# Patient Record
Sex: MALE | Race: WHITE | NOT HISPANIC OR LATINO | Employment: UNEMPLOYED | URBAN - METROPOLITAN AREA
[De-identification: names, ages, dates, MRNs, and addresses within clinical notes are randomized per-mention and may not be internally consistent; named-entity substitution may affect disease eponyms.]

---

## 2017-01-05 ENCOUNTER — APPOINTMENT (OUTPATIENT)
Dept: RADIOLOGY | Facility: MEDICAL CENTER | Age: 37
End: 2017-01-05
Attending: EMERGENCY MEDICINE
Payer: MEDICAID

## 2017-01-05 ENCOUNTER — HOSPITAL ENCOUNTER (EMERGENCY)
Facility: MEDICAL CENTER | Age: 37
End: 2017-01-05
Attending: EMERGENCY MEDICINE
Payer: MEDICAID

## 2017-01-05 VITALS
HEIGHT: 78 IN | BODY MASS INDEX: 36.45 KG/M2 | RESPIRATION RATE: 18 BRPM | TEMPERATURE: 97 F | OXYGEN SATURATION: 97 % | HEART RATE: 73 BPM | SYSTOLIC BLOOD PRESSURE: 133 MMHG | DIASTOLIC BLOOD PRESSURE: 81 MMHG | WEIGHT: 315 LBS
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
## 2017-01-05 DIAGNOSIS — S16.1XXA NECK STRAIN, INITIAL ENCOUNTER: ICD-10-CM

## 2017-01-05 PROCEDURE — 99284 EMERGENCY DEPT VISIT MOD MDM: CPT

## 2017-01-05 PROCEDURE — 73030 X-RAY EXAM OF SHOULDER: CPT | Mod: RT

## 2017-01-05 PROCEDURE — 72125 CT NECK SPINE W/O DYE: CPT

## 2017-01-05 RX ORDER — HYDROCODONE BITARTRATE AND ACETAMINOPHEN 5; 325 MG/1; MG/1
1 TABLET ORAL EVERY 4 HOURS PRN
Qty: 20 TAB | Refills: 0 | Status: SHIPPED | OUTPATIENT
Start: 2017-01-05 | End: 2017-01-28

## 2017-01-05 RX ORDER — NAPROXEN 500 MG/1
500 TABLET ORAL 2 TIMES DAILY WITH MEALS
Qty: 14 TAB | Refills: 0 | Status: SHIPPED | OUTPATIENT
Start: 2017-01-05 | End: 2017-01-12

## 2017-01-05 ASSESSMENT — PAIN SCALES - GENERAL: PAINLEVEL_OUTOF10: 10

## 2017-01-05 NOTE — ED AVS SNAPSHOT
ZEturf Access Code: [email or web address]  Expires: 2/4/2017  8:09 PM    ZEturf  A secure, online tool to manage your health information     [employer]’s ZEturf® is a secure, online tool that connects you to your personalized health information from the privacy of your home -- day or night - making it very easy for you to manage your healthcare. Once the activation process is completed, you can even access your medical information using the ZEturf jodi, which is available for free in the Apple Jodi store or Google Play store.     ZEturf provides the following levels of access (as shown below):   My Chart Features   [facility] Primary Care Doctor [facility]  Specialists [facility]  Urgent  Care Non-[facility]  Primary Care  Doctor   Email your healthcare team securely and privately 24/7 X X X X   Manage appointments: schedule your next appointment; view details of past/upcoming appointments X      Request prescription refills. X      View recent personal medical records, including lab and immunizations X X X X   View health record, including health history, allergies, medications X X X X   Read reports about your outpatient visits, procedures, consult and ER notes X X X X   See your discharge summary, which is a recap of your hospital and/or ER visit that includes your diagnosis, lab results, and care plan. X X       How to register for ZEturf:  1. Go to  [email or web address].  2. Click on the Sign Up Now box, which takes you to the New Member Sign Up page. You will need to provide the following information:  a. Enter your ZEturf Access Code exactly as it appears at the top of this page. (You will not need to use this code after you’ve completed the sign-up process. If you do not sign up before the expiration date, you must request a new code.)   b. Enter your date of birth.   c. Enter your home email address.   d. Click Submit, and follow the next screen’s instructions.  3. Create a ZEturf ID. This will be your ZEturf  login ID and cannot be changed, so think of one that is secure and easy to remember.  4. Create a Rent.com password. You can change your password at any time.  5. Enter your Password Reset Question and Answer. This can be used at a later time if you forget your password.   6. Enter your e-mail address. This allows you to receive e-mail notifications when new information is available in Rent.[email or web address].  7. Click Sign Up. You can now view your health information.    For assistance activating your Rent.com account, call [phone]

## 2017-01-05 NOTE — ED AVS SNAPSHOT
After Visit Summary                                                                                                                Norm Prabhakar   MRN: [id number]    Department:  [facility], Emergency Dept   Date of Visit:  1/5/2017            [facility], Emergency Dept    [address]    [facility] [address]    Phone:  [phone]      You were seen by     Gary Gansert, M.D.      Your Diagnosis Was     Fall, initial encounter     W19.XXXA       Follow-up Information     1. Follow up with Long Linares M.D.. Schedule an appointment as soon as possible for a visit in 1 week.    Specialty:  Family Medicine    Why:  As needed    Contact information    [address]  [address]  [facility] [address]  [phone]        Medication Information     Review all of your home medications and newly ordered medications with your primary doctor and/or pharmacist as soon as possible. Follow medication instructions as directed by your doctor and/or pharmacist.     Please keep your complete medication list with you and share with your physician. Update the information when medications are discontinued, doses are changed, or new medications (including over-the-counter products) are added; and carry medication information at all times in the event of emergency situations.               Medication List      START taking these medications        Instructions    hydrocodone-acetaminophen 5-325 MG Tabs per tablet   Commonly known as:  NORCO    Take 1 Tab by mouth every four hours as needed.   Dose:  1 Tab         ASK your doctor about these medications        Instructions    calcium carbonate 500 MG Chew   Commonly known as:  TUMS    Take 1 Tab by mouth after meals as needed.   Dose:  500 mg       DEPAKOTE PO    Take  by mouth.       ibuprofen 600 MG Tabs   Commonly known as:  MOTRIN    Take 1 Tab by mouth every 6 hours as needed.   Dose:  600 mg       levothyroxine 125 MCG Tabs   Commonly known  as:  SYNTHROID    Take 125 mcg by mouth Every morning on an empty stomach.   Dose:  125 mcg       * naproxen 500 MG Tabs   What changed:  Another medication with the same name was added. Make sure you understand how and when to take each.   Commonly known as:  NAPROSYN   Ask about: Which instructions should I use?    Take 1 Tab by mouth 2 times a day, with meals.   Dose:  500 mg       * naproxen 500 MG Tabs   What changed:  You were already taking a medication with the same name, and this prescription was added. Make sure you understand how and when to take each.   Commonly known as:  NAPROSYN   Ask about: Which instructions should I use?    Take 1 Tab by mouth 2 times a day, with meals.   Dose:  500 mg       prazosin 2 MG Caps   Commonly known as:  MINIPRESS    Take 3 Caps by mouth every evening.   Dose:  6 mg       sertraline 100 MG Tabs   Commonly known as:  ZOLOFT    Take 100 mg by mouth every day.   Dose:  100 mg       * Notice:  This list has 2 medication(s) that are the same as other medications prescribed for you. Read the directions carefully, and ask your doctor or other care provider to review them with you.            Procedures and tests performed during your visit     CT-CSPINE WITHOUT PLUS RECONS    DX-SHOULDER 2+ RIGHT            Patient Information     Patient Information    Following emergency treatment: all patient requiring follow-up care must return either to a private physician or a clinic if your condition worsens before you are able to obtain further medical attention, please return to the emergency room.     Billing Information    At [facility], we work to make the billing process streamlined for our patients.  Our Representatives are here to answer any questions you may have regarding your hospital bill.  If you have insurance coverage and have supplied your insurance information to us, we will submit a claim to your insurer on your behalf.  Should you have any questions regarding your  bill, we can be reached online or by phone as follows:  Online: You are able pay your bills online or live chat with our representatives about any billing questions you may have. We are here to help Monday - Friday from 8:00am to 7:30pm and 9:00am - 12:00pm on Saturdays.  Please visit [email or web address]  for more information.   Phone:  [phone] or [phone]    Please note that your emergency physician, surgeon, pathologist, radiologist, anesthesiologist, and other specialists are not employed by [facility] and will therefore bill separately for their services.  Please contact them directly for any questions concerning their bills at the numbers below:     Emergency Physician Services:  [phone]  [facility]:  [phone]  [facility]:  [phone]  [facility]:  [phone]    1. Your final bill may vary from the amount quoted upon discharge if all procedures are not complete at that time, or if your doctor has additional procedures of which we are not aware. You will receive an additional bill if you return to the Emergency Department at [facility] for suture removal regardless of the facility of which the sutures were placed.     2. Please arrange for settlement of this account at the emergency registration.    3. All self-pay accounts are due in full at the time of treatment.  If you are unable to meet this obligation then payment is expected within 4-5 days.     4. If you have had radiology studies (CT, X-ray, Ultrasound, MRI), you have received a preliminary result during your emergency department visit. Please contact the radiology department [phone] to receive a copy of your final result. Please discuss the Final result with your primary physician or with the follow up physician provided.     Crisis Hotline:  [place] Crisis Hotline:  7-840-OYOAUXB or 1-868.586.8981  [place] Crisis Hotline:     4-188-735-0029 or 814-801-6566         ED Discharge Follow Up Questions    1. In order to provide you with very good care, we would like to follow up with a phone call in the next few days.  May we have your permission to contact you?     YES /  NO    2. What is the best phone number to call you? (       )_____-__________    3. What is the best time to call you?      Morning  /  Afternoon  /  Evening                   Patient Signature:  ____________________________________________________________    Date:  ____________________________________________________________

## 2017-01-05 NOTE — ED AVS SNAPSHOT
1/5/2017          Norm Prabhakar  [address] [facility] [address]    Dear Norm:    [facility] wants to ensure your discharge home is safe and you or your loved ones have had all your questions answered regarding your care after you leave the hospital.    You may receive a telephone call within two days of your discharge.  This call is to make certain you understand your discharge instructions as well as ensure we provided you with the best care possible during your stay with us.     The call will only last approximately 3-5 minutes and will be done by a nurse.    Once again, we want to ensure your discharge home is safe and that you have a clear understanding of any next steps in your care.  If you have any questions or concerns, please do not hesitate to contact us, we are here for you.  Thank you for choosing [facility] for your healthcare needs.    Sincerely,    Alex Vasquez    [facility]

## 2017-01-06 NOTE — ED PROVIDER NOTES
"ED Provider Note  CHIEF COMPLAINT  Chief Complaint   Patient presents with   • GLF     Pt BIB self to triage for c/o right shoulder pain after slipping on ice. pt reports right shoulder pain/neck pain. denies LOC/any other trauma.    • Shoulder Pain   • Neck Pain       HPI  Norm Prabhakar is a 36 y.o. male who presents for evaluation of some neck pain and right shoulder pain after a ground-level fall on some ice. No other injuries. No loss of consciousness. No injury to the chest of the abdomen or the other extremities. The incident happened within the past couple of hours.    REVIEW OF SYSTEMS  See HPI for further details. Review of systems otherwise negative.     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   • Psychiatric problem 2002     PTSD   • Mental disorder      learning disabilities; speech impairment; developmental delays   • Seizure (HCC) 2010   • Pneumonia      remote   • Indigestion      once in a while   • Unspecified disorder of thyroid        FAMILY HISTORY  Family History   Problem Relation Age of Onset   • Hypertension Mother    • Heart Disease Mother    • Lung Disease Mother    • Stroke Maternal Grandmother        SOCIAL HISTORY  Nonsmoker    SURGICAL HISTORY  Past Surgical History   Procedure Laterality Date   • Eye surgery     • Thyroid lobectomy     • Other       Hernia Repair when he was 10 yrs old         ALLERGIES  Allergies   Allergen Reactions   • Abilify Rash     \"feeling tired, like I don't even know whats going on around me\"   • Aripiprazole      \"I became lethargic.\"   • Fish        PHYSICAL EXAM  VITAL SIGNS: /81 mmHg  Pulse 73  Temp(Src) 36.1 °C (97 °F)  Resp 18  Ht 1.981 m (6' " "5.99\")  Wt 145.9 kg (321 lb 10.4 oz)  BMI 37.18 kg/m2  SpO2 97%  vital signs are noted.  [id number]: Alert male adult  HENT: head is nontender. Negative raccoon sign. Negative Hodges's sign. Negative hemotympanum. Ears: Clear. Nose: Symmetrical and nontender. No obvious signs of facial trauma. Throat is clear with no stridor, drooling, trismus.  Eyes: PERRLA, EOMI, Conjunctiva normal, No discharge.   Neck: Really no significant tenderness or pain to the posterior portion of the neck. Little bit of right trapezius muscular kind of pain.  Cardiovascular: Normal heart rate, Normal rhythm, No murmurs, No rubs, No gallops.   Thorax & Lungs: Normal breath sounds, No respiratory distress, No wheezing, No chest tenderness.   Abdomen: Soft, nontender, no rigidity, no organomegaly, no ecchymosis.   Skin: Warm, Dry, No erythema, No rash.   Back: No back pain and no back tenderness. No tenderness to the cervical nor thoracic nor lumbar spine areas.  Extremities: Intact distal pulses, No edema, No tenderness, No cyanosis   Musculoskeletal: Pain with motion to the right shoulder. No obvious deformity. No obvious clinical fracture. No scapular nor humeral nor clavicular fracture clinically.  Neurologic: Alert & oriented . Cranial nerves are grossly intact as tested. Patient moves all 4 extremities well.     RADIOLOGY/PROCEDURES  CT-CSPINE WITHOUT PLUS RECONS   Final Result      1.  No evidence of cervical spine fracture.      2.  Again seen kyphotic deformity at the C4-5 level.      DX-SHOULDER 2+ RIGHT   Final Result      No evidence of acute fracture or dislocation.              COURSE & MEDICAL DECISION MAKING  CT of the cervical spine is been obtained and an x-ray of the right shoulder has been obtained.    CT of the cervical spine shows no fracture. X-ray of the shoulder shows no fracture and no bony deformity. Patient most likely just has a right shoulder strain.    Home treatment: #1 a right arm sling is applied " and is in good position #2 a prescription for some Naprosyn and hydrocodone. #3 orthopedic follow-up given. Stable on discharge.    FINAL IMPRESSION  1. Neck strain  2. Right shoulder strain      Electronically signed by: Gary Gansert, 1/5/2017

## 2017-01-06 NOTE — ED NOTES
Chief Complaint   Patient presents with   • GLF     Pt BIB self to triage for c/o right shoulder pain after slipping on ice. pt reports right shoulder pain/neck pain. denies LOC/any other trauma.    • Shoulder Pain   • Neck Pain     Explained to pt triage process, made pt aware to tell this RN of any changes/concerns, pt verbalized understanding of process and instructions given. Pt to ER lobby.

## 2017-01-06 NOTE — ED NOTES
Discharge orders received,  instructions and education given, follow-up appointments discussed, pt verbalized understanding.

## 2017-01-12 ENCOUNTER — HOSPITAL ENCOUNTER (EMERGENCY)
Facility: MEDICAL CENTER | Age: 37
End: 2017-01-12
Attending: EMERGENCY MEDICINE
Payer: MEDICAID

## 2017-01-12 VITALS
SYSTOLIC BLOOD PRESSURE: 124 MMHG | HEART RATE: 68 BPM | WEIGHT: 315 LBS | RESPIRATION RATE: 14 BRPM | BODY MASS INDEX: 36.67 KG/M2 | OXYGEN SATURATION: 97 % | TEMPERATURE: 96.8 F | DIASTOLIC BLOOD PRESSURE: 78 MMHG

## 2017-01-12 DIAGNOSIS — R51.9 CHRONIC NONINTRACTABLE HEADACHE, UNSPECIFIED HEADACHE TYPE: ICD-10-CM

## 2017-01-12 DIAGNOSIS — G89.29 CHRONIC NONINTRACTABLE HEADACHE, UNSPECIFIED HEADACHE TYPE: ICD-10-CM

## 2017-01-12 PROCEDURE — A9270 NON-COVERED ITEM OR SERVICE: HCPCS | Performed by: EMERGENCY MEDICINE

## 2017-01-12 PROCEDURE — 700102 HCHG RX REV CODE 250 W/ 637 OVERRIDE(OP): Performed by: EMERGENCY MEDICINE

## 2017-01-12 PROCEDURE — 700111 HCHG RX REV CODE 636 W/ 250 OVERRIDE (IP): Performed by: EMERGENCY MEDICINE

## 2017-01-12 PROCEDURE — 99283 EMERGENCY DEPT VISIT LOW MDM: CPT

## 2017-01-12 RX ORDER — DEXAMETHASONE 4 MG/1
8 TABLET ORAL ONCE
Status: COMPLETED | OUTPATIENT
Start: 2017-01-12 | End: 2017-01-12

## 2017-01-12 RX ORDER — DIPHENHYDRAMINE HCL 25 MG
25 TABLET ORAL ONCE
Status: COMPLETED | OUTPATIENT
Start: 2017-01-12 | End: 2017-01-12

## 2017-01-12 RX ORDER — ONDANSETRON 4 MG/1
4 TABLET, ORALLY DISINTEGRATING ORAL ONCE
Status: COMPLETED | OUTPATIENT
Start: 2017-01-12 | End: 2017-01-12

## 2017-01-12 RX ORDER — IBUPROFEN 600 MG/1
600 TABLET ORAL ONCE
Status: COMPLETED | OUTPATIENT
Start: 2017-01-12 | End: 2017-01-12

## 2017-01-12 RX ORDER — BUTALBITAL, ACETAMINOPHEN AND CAFFEINE 50; 325; 40 MG/1; MG/1; MG/1
1 TABLET ORAL ONCE
Status: COMPLETED | OUTPATIENT
Start: 2017-01-12 | End: 2017-01-12

## 2017-01-12 RX ADMIN — DIPHENHYDRAMINE HCL 25 MG: 25 TABLET ORAL at 14:20

## 2017-01-12 RX ADMIN — BUTALBITAL, ACETAMINOPHEN, AND CAFFEINE 1 TABLET: 50; 325; 40 TABLET ORAL at 14:20

## 2017-01-12 RX ADMIN — ONDANSETRON 4 MG: 4 TABLET, ORALLY DISINTEGRATING ORAL at 14:20

## 2017-01-12 RX ADMIN — DEXAMETHASONE 8 MG: 4 TABLET ORAL at 14:20

## 2017-01-12 RX ADMIN — IBUPROFEN 600 MG: 600 TABLET, FILM COATED ORAL at 14:20

## 2017-01-12 ASSESSMENT — LIFESTYLE VARIABLES: DO YOU DRINK ALCOHOL: NO

## 2017-01-12 ASSESSMENT — PAIN SCALES - GENERAL: PAINLEVEL_OUTOF10: 10

## 2017-01-12 NOTE — ED AVS SNAPSHOT
BoxVentures Access Code: [email or web address]  Expires: 2/4/2017  8:09 PM    BoxVentures  A secure, online tool to manage your health information     [employer]’s BoxVentures® is a secure, online tool that connects you to your personalized health information from the privacy of your home -- day or night - making it very easy for you to manage your healthcare. Once the activation process is completed, you can even access your medical information using the BoxVentures jodi, which is available for free in the Apple Jodi store or Google Play store.     BoxVentures provides the following levels of access (as shown below):   My Chart Features   [facility] Primary Care Doctor [facility]  Specialists [facility]  Urgent  Care Non-[facility]  Primary Care  Doctor   Email your healthcare team securely and privately 24/7 X X X X   Manage appointments: schedule your next appointment; view details of past/upcoming appointments X      Request prescription refills. X      View recent personal medical records, including lab and immunizations X X X X   View health record, including health history, allergies, medications X X X X   Read reports about your outpatient visits, procedures, consult and ER notes X X X X   See your discharge summary, which is a recap of your hospital and/or ER visit that includes your diagnosis, lab results, and care plan. X X       How to register for BoxVentures:  1. Go to  [email or web address].  2. Click on the Sign Up Now box, which takes you to the New Member Sign Up page. You will need to provide the following information:  a. Enter your BoxVentures Access Code exactly as it appears at the top of this page. (You will not need to use this code after you’ve completed the sign-up process. If you do not sign up before the expiration date, you must request a new code.)   b. Enter your date of birth.   c. Enter your home email address.   d. Click Submit, and follow the next screen’s instructions.  3. Create a BoxVentures ID. This will be your BoxVentures  login ID and cannot be changed, so think of one that is secure and easy to remember.  4. Create a [employer] password. You can change your password at any time.  5. Enter your Password Reset Question and Answer. This can be used at a later time if you forget your password.   6. Enter your e-mail address. This allows you to receive e-mail notifications when new information is available in [employer].  7. Click Sign Up. You can now view your health information.    For assistance activating your [employer] account, call [phone]

## 2017-01-12 NOTE — ED NOTES
Verbalizes understanding of discharge and followup instructions. VSS. Ambulates with steady gait to discharge.

## 2017-01-12 NOTE — ED AVS SNAPSHOT
1/12/2017          Norm Prabhakar  [address] [facility] [address] [facility] [address]    Dear Norm:    [facility] wants to ensure your discharge home is safe and you or your loved ones have had all your questions answered regarding your care after you leave the hospital.    You may receive a telephone call within two days of your discharge.  This call is to make certain you understand your discharge instructions as well as ensure we provided you with the best care possible during your stay with us.     The call will only last approximately 3-5 minutes and will be done by a nurse.    Once again, we want to ensure your discharge home is safe and that you have a clear understanding of any next steps in your care.  If you have any questions or concerns, please do not hesitate to contact us, we are here for you.  Thank you for choosing [facility] for your healthcare needs.    Sincerely,    Alex Vasquez    [facility]

## 2017-01-12 NOTE — DISCHARGE INSTRUCTIONS
"Headaches, Frequently Asked Questions  MIGRAINE HEADACHES  Q: What is migraine? What causes it? How can I treat it?  A: Generally, migraine headaches begin as a dull ache. Then they develop into a constant, throbbing, and pulsating pain. You may experience pain at the temples. You may experience pain at the front or back of one or both sides of the head. The pain is usually accompanied by a combination of:  · Nausea.   · Vomiting.   · Sensitivity to light and noise.   Some people (about 15%) experience an aura (see below) before an attack. The cause of migraine is believed to be chemical reactions in the brain. Treatment for migraine may include over-the-counter or prescription medications. It may also include self-help techniques. These include relaxation training and biofeedback.   Q: What is an aura?  A: About 15% of people with migraine get an \"aura\". This is a sign of neurological symptoms that occur before a migraine headache. You may see wavy or jagged lines, dots, or flashing lights. You might experience tunnel vision or blind spots in one or both eyes. The aura can include visual or auditory hallucinations (something imagined). It may include disruptions in smell (such as strange odors), taste or touch. Other symptoms include:  · Numbness.   · A \"pins and needles\" sensation.   · Difficulty in recalling or speaking the correct word.   These neurological events may last as long as 60 minutes. These symptoms will fade as the headache begins.  Q: What is a trigger?  A: Certain physical or environmental factors can lead to or \"trigger\" a migraine. These include:  · Foods.   · Hormonal changes.   · Weather.   · Stress.   It is important to remember that triggers are different for everyone. To help prevent migraine attacks, you need to figure out which triggers affect you. Keep a headache diary. This is a good way to track triggers. The diary will help you talk to your healthcare professional about your " "condition.  Q: Does weather affect migraines?  A: Bright sunshine, hot, humid conditions, and drastic changes in barometric pressure may lead to, or \"trigger,\" a migraine attack in some people. But studies have shown that weather does not act as a trigger for everyone with migraines.  Q: What is the link between migraine and hormones?  A: Hormones start and regulate many of your body's functions. Hormones keep your body in balance within a constantly changing environment. The levels of hormones in your body are unbalanced at times. Examples are during menstruation, pregnancy, or menopause. That can lead to a migraine attack. In fact, about three quarters of all women with migraine report that their attacks are related to the menstrual cycle.   Q: Is there an increased risk of stroke for migraine sufferers?  A: The likelihood of a migraine attack causing a stroke is very remote. That is not to say that migraine sufferers cannot have a stroke associated with their migraines. In persons under age 40, the most common associated factor for stroke is migraine headache. But over the course of a person's normal life span, the occurrence of migraine headache may actually be associated with a reduced risk of dying from cerebrovascular disease due to stroke.   Q: What are acute medications for migraine?  A: Acute medications are used to treat the pain of the headache after it has started. Examples over-the-counter medications, NSAIDs, ergots, and triptans.   Q: What are the triptans?  A: Triptans are the newest class of abortive medications. They are specifically targeted to treat migraine. Triptans are vasoconstrictors. They moderate some chemical reactions in the brain. The triptans work on receptors in your brain. Triptans help to restore the balance of a neurotransmitter called serotonin. Fluctuations in levels of serotonin are thought to be a main cause of migraine.   Q: Are over-the-counter medications for migraine " "effective?  A: Over-the-counter, or \"OTC,\" medications may be effective in relieving mild to moderate pain and associated symptoms of migraine. But you should see your caregiver before beginning any treatment regimen for migraine.   Q: What are preventive medications for migraine?  A: Preventive medications for migraine are sometimes referred to as \"prophylactic\" treatments. They are used to reduce the frequency, severity, and length of migraine attacks. Examples of preventive medications include antiepileptic medications, antidepressants, beta-blockers, calcium channel blockers, and NSAIDs (nonsteroidal anti-inflammatory drugs).  Q: Why are anticonvulsants used to treat migraine?  A: During the past few years, there has been an increased interest in antiepileptic drugs for the prevention of migraine. They are sometimes referred to as \"anticonvulsants\". Both epilepsy and migraine may be caused by similar reactions in the brain.   Q: Why are antidepressants used to treat migraine?  A: Antidepressants are typically used to treat people with depression. They may reduce migraine frequency by regulating chemical levels, such as serotonin, in the brain.   Q: What alternative therapies are used to treat migraine?  A: The term \"alternative therapies\" is often used to describe treatments considered outside the scope of conventional Western medicine. Examples of alternative therapy include acupuncture, acupressure, and yoga. Another common alternative treatment is herbal therapy. Some herbs are believed to relieve headache pain. Always discuss alternative therapies with your caregiver before proceeding. Some herbal products contain arsenic and other toxins.  TENSION HEADACHES  Q: What is a tension-type headache? What causes it? How can I treat it?  A: Tension-type headaches occur randomly. They are often the result of temporary stress, anxiety, fatigue, or anger. Symptoms include soreness in your temples, a tightening " "band-like sensation around your head (a \"vice-like\" ache). Symptoms can also include a pulling feeling, pressure sensations, and brent head and neck muscles. The headache begins in your forehead, temples, or the back of your head and neck. Treatment for tension-type headache may include over-the-counter or prescription medications. Treatment may also include self-help techniques such as relaxation training and biofeedback.  CLUSTER HEADACHES  Q: What is a cluster headache? What causes it? How can I treat it?  A: Cluster headache gets its name because the attacks come in groups. The pain arrives with little, if any, warning. It is usually on one side of the head. A tearing or bloodshot eye and a runny nose on the same side of the headache may also accompany the pain. Cluster headaches are believed to be caused by chemical reactions in the brain. They have been described as the most severe and intense of any headache type. Treatment for cluster headache includes prescription medication and oxygen.  SINUS HEADACHES  Q: What is a sinus headache? What causes it? How can I treat it?  A: When a cavity in the bones of the face and skull (a sinus) becomes inflamed, the inflammation will cause localized pain. This condition is usually the result of an allergic reaction, a tumor, or an infection. If your headache is caused by a sinus blockage, such as an infection, you will probably have a fever. An x-ray will confirm a sinus blockage. Your caregiver's treatment might include antibiotics for the infection, as well as antihistamines or decongestants.   REBOUND HEADACHES  Q: What is a rebound headache? What causes it? How can I treat it?  A: A pattern of taking acute headache medications too often can lead to a condition known as \"rebound headache.\" A pattern of taking too much headache medication includes taking it more than 2 days per week or in excessive amounts. That means more than the label or a caregiver advises. " With rebound headaches, your medications not only stop relieving pain, they actually begin to cause headaches. Doctors treat rebound headache by tapering the medication that is being overused. Sometimes your caregiver will gradually substitute a different type of treatment or medication. Stopping may be a challenge. Regularly overusing a medication increases the potential for serious side effects. Consult a caregiver if you regularly use headache medications more than 2 days per week or more than the label advises.  ADDITIONAL QUESTIONS AND ANSWERS  Q: What is biofeedback?  A: Biofeedback is a self-help treatment. Biofeedback uses special equipment to monitor your body's involuntary physical responses. Biofeedback monitors:  · Breathing.   · Pulse.   · Heart rate.   · Temperature.   · Muscle tension.   · Brain activity.   Biofeedback helps you refine and perfect your relaxation exercises. You learn to control the physical responses that are related to stress. Once the technique has been mastered, you do not need the equipment any more.  Q: Are headaches hereditary?  A: Four out of five (80%) of people that suffer report a family history of migraine. Scientists are not sure if this is genetic or a family predisposition. Despite the uncertainty, a child has a 50% chance of having migraine if one parent suffers. The child has a 75% chance if both parents suffer.   Q: Can children get headaches?  A: By the time they reach high school, most young people have experienced some type of headache. Many safe and effective approaches or medications can prevent a headache from occurring or stop it after it has begun.   Q: What type of doctor should I see to diagnose and treat my headache?  A: Start with your primary caregiver. Discuss his or her experience and approach to headaches. Discuss methods of classification, diagnosis, and treatment. Your caregiver may decide to recommend you to a headache specialist, depending upon  your symptoms or other physical conditions. Having diabetes, allergies, etc., may require a more comprehensive and inclusive approach to your headache. The [employer] will provide, upon request, a list of NHF physician members in your state.  Document Released: 03/09/2005 Document Revised: 03/11/2013 Document Reviewed: 08/17/2009  [employer]® Patient Information ©2013 [employer], [employer].

## 2017-01-12 NOTE — ED PROVIDER NOTES
ED Provider Note    Scribed for Lola Lopez M.D. by Disha Harris. 1/12/2017, 2:08 PM.    Primary Care Provider: Long Linares M.D.  Means of arrival: Walk-in  History obtained from: Patient  History limited by: None    CHIEF COMPLAINT  Chief Complaint   Patient presents with   • Head Ache     x6days       HPI  Norm Prabhakar is a 36 y.o. male who presents to the Emergency Department with headache for 6 days. Patient reports he has taken Excedrin, ibuprofen, and Tylenol without relief. He has also tried combining ibuprofen and Tylenol. He last took Excedrin this morning. Patient describes his headache as pounding, and reports one episode of emesis this morning. Patient has a history of headaches and states this headache feels similar. He tells me has been to the ED for headaches before and his headache has been relieved by medications here in the past. He denies fever, chills, ear pain, congestion, or other symptoms.     REVIEW OF SYSTEMS  Pertinent positives include headache, vomiting. Pertinent negatives include no fever, chills, ear pain, congestion.      PAST MEDICAL HISTORY   has a past medical history of ASTHMA; Glaucoma; Hypothyroidism; Depression; Anxiety; Seizure disorder ([facility]); Bipolar 1 disorder ([facility]); S/P thyroidectomy; Murmur; Fall; Glaucoma (1982); Psychiatric problem (2002); Mental disorder; Seizure ([facility]) (2010); Pneumonia; Indigestion; and Unspecified disorder of thyroid.    SOCIAL HISTORY  Social History   Substance Use Topics   • Smoking status: Former Smoker -- 0.25 packs/day for 4 years     Types: Cigars, Cigarettes     Quit date: 01/01/2014   • Smokeless tobacco: Never Used   • Alcohol Use: No      History   Drug Use No       SURGICAL HISTORY   has past surgical history that includes eye surgery; thyroid lobectomy; and other.     CURRENT MEDICATIONS  Home Medications     Reviewed by Alex Blevins R.N. (Registered Nurse) on 01/12/17 at 1407  Med List Status: Complete     "Medication Last Dose Status    calcium carbonate (TUMS) 500 MG Chew Tab prn Active    Divalproex Sodium (DEPAKOTE PO) taking Active    hydrocodone-acetaminophen (NORCO) 5-325 MG Tab per tablet prn Active    ibuprofen (MOTRIN) 600 MG Tab prn Active    levothyroxine (SYNTHROID) 125 MCG TABS 1/12/2017 Active    prazosin (MINIPRESS) 2 MG Cap 1/11/2017 Active    sertraline (ZOLOFT) 100 MG TABS 1/12/2017 Active                ALLERGIES  Allergies   Allergen Reactions   • Abilify Rash     \"feeling tired, like I don't even know whats going on around me\"   • Aripiprazole      \"I became lethargic.\"   • Fish        PHYSICAL EXAM  VITAL SIGNS: /74 mmHg  Pulse 79  Temp(Src) 36 °C (96.8 °F)  Resp 16  Wt 143.9 kg (317 lb 3.9 oz)  Constitutional: Alert in no apparent distress. Well apearing  HENT: Normocephalic, Atraumatic, Bilateral external ears normal. Nose normal.   Eyes:  Conjunctiva normal, non-icteric.   Lungs: Non-labored respirations  Skin: Warm, Dry, No erythema, No rash.   Neurologic: Alert, Grossly non-focal.   Psychiatric: Affect normal, Judgment normal, Mood normal, Appears appropriate and not intoxicated.   Extremities: No edema, no deformities      COURSE & MEDICAL DECISION MAKING  Pertinent Labs & Imaging studies reviewed. (See chart for details)    Reviewed patient's old medical records which showed the patient had a MRI of the head one month ago.     2:08 PM - Patient seen and examined at bedside. Patient has a history of chronic headaches this is consistent with his headaches in the past. He has had recent imaging of his head. I do not think any additional imaging is required. I explained to the patient I will treat his symptoms. We discussed narcotic pain medications are not effective treatments for headache. Patient will be treated with Fioricet -40 mg PO, Motrin 600 mg PO, Zofran 4 mg PO, Benadryl 25 mg PO, Decadron 8 mg PO. He was made aware he will be discharged home and was advised to " follow up with his primary care provider. He will return to the ED for vision changes, vomiting, worsening symptoms, or other medical concerns.       The patient will return for new or worsening symptoms and is stable at the time of discharge.    The patient is referred to a primary physician for blood pressure management, diabetic screening, and for all other preventative health concerns.    DISPOSITION:  Patient will be discharged home in stable condition.    FOLLOW UP:  Long Linares M.D.  [address]  [address]  [facility] [address]  [phone]    Schedule an appointment as soon as possible for a visit  As needed    [facility], Emergency Dept  [address] [id number] [phone]    If symptoms worsen      FINAL IMPRESSION  1. Chronic nonintractable headache, unspecified headache type         This dictation has been created using voice recognition software and/or scribes. The accuracy of the dictation is limited by the abilities of the software and the expertise of the scribes. I expect there may be some errors of grammar and possibly content. I made every attempt to manually correct the errors within my dictation. However, errors related to voice recognition software and/or scribes may still exist and should be interpreted within the appropriate context.     IDisha (Scribe), am scribing for, and in the presence of, Lola Lopez M.D..    Electronically signed by: Disha Harris (Scribe), 1/12/2017    Lola ROB M.D. personally performed the services described in this documentation, as scribed by Disha Harris in my presence, and it is both accurate and complete.    The note accurately reflects work and decisions made by me.  Lola Lopez  1/12/2017  3:06 PM

## 2017-01-12 NOTE — ED NOTES
Ambulatory to room. C/o headache x6 days, no relief from OTC treatments. Neuro intact. Chart up for ERP.

## 2017-01-12 NOTE — ED AVS SNAPSHOT
After Visit Summary                                                                                                                Norm Prabhakar   MRN: [id number]    Department:  [facility], Emergency Dept   Date of Visit:  1/12/2017            [facility], Emergency Dept    [address] [place] [id number]    Phone:  [phone]      You were seen by     Lola Lopez M.D.      Your Diagnosis Was     Chronic nonintractable headache, unspecified headache type     R51       These are the medications you received during your hospitalization from 01/12/2017 1341 to 01/12/2017 1442     Date/Time Order Dose Route Action    01/12/2017 1420 acetaminophen/caffeine/butalbital 325-40-50 mg (FIORICET) -40 MG per tablet 1 Tab 1 Tab Oral Given    01/12/2017 1420 ibuprofen (MOTRIN) tablet 600 mg 600 mg Oral Given    01/12/2017 1420 ondansetron (ZOFRAN ODT) dispertab 4 mg 4 mg Oral Given    01/12/2017 1420 diphenhydrAMINE (BENADRYL) tablet/capsule 25 mg 25 mg Oral Given    01/12/2017 1420 dexamethasone (DECADRON) tablet 8 mg 8 mg Oral Given      Follow-up Information     1. Schedule an appointment as soon as possible for a visit with Long Linares M.D..    Specialty:  Family Medicine    Why:  As needed    Contact information    [address]  [address]  [facility] [address] [phone]          2. Follow up with [facility], Emergency Dept.    Specialty:  Emergency Medicine    Why:  If symptoms worsen    Contact information    [address] [place] [id number] [phone]      Medication Information     Review all of your home medications and newly ordered medications with your primary doctor and/or pharmacist as soon as possible. Follow medication instructions as directed by your doctor and/or pharmacist.     Please keep your complete medication list with you and share with your physician. Update the information when medications are discontinued,  doses are changed, or new medications (including over-the-counter products) are added; and carry medication information at all times in the event of emergency situations.               Medication List      ASK your doctor about these medications        Instructions    calcium carbonate 500 MG Chew   Commonly known as:  TUMS    Take 1 Tab by mouth after meals as needed.   Dose:  500 mg       DEPAKOTE PO    Take  by mouth.       hydrocodone-acetaminophen 5-325 MG Tabs per tablet   Commonly known as:  NORCO    Take 1 Tab by mouth every four hours as needed.   Dose:  1 Tab       ibuprofen 600 MG Tabs   Commonly known as:  MOTRIN    Take 1 Tab by mouth every 6 hours as needed.   Dose:  600 mg       levothyroxine 125 MCG Tabs   Commonly known as:  SYNTHROID    Take 125 mcg by mouth Every morning on an empty stomach.   Dose:  125 mcg       prazosin 2 MG Caps   Commonly known as:  MINIPRESS    Take 3 Caps by mouth every evening.   Dose:  6 mg       sertraline 100 MG Tabs   Commonly known as:  ZOLOFT    Take 100 mg by mouth every day.   Dose:  100 mg                 Discharge Instructions       Headaches, Frequently Asked Questions  MIGRAINE HEADACHES  Q: What is migraine? What causes it? How can I treat it?  A: Generally, migraine headaches begin as a dull ache. Then they develop into a constant, throbbing, and pulsating pain. You may experience pain at the temples. You may experience pain at the front or back of one or both sides of the head. The pain is usually accompanied by a combination of:  · Nausea.   · Vomiting.   · Sensitivity to light and noise.   Some people (about 15%) experience an aura (see below) before an attack. The cause of migraine is believed to be chemical reactions in the brain. Treatment for migraine may include over-the-counter or prescription medications. It may also include self-help techniques. These include relaxation training and biofeedback.   Q: What is an aura?  A: About 15% of people with  "migraine get an \"aura\". This is a sign of neurological symptoms that occur before a migraine headache. You may see wavy or jagged lines, dots, or flashing lights. You might experience tunnel vision or blind spots in one or both eyes. The aura can include visual or auditory hallucinations (something imagined). It may include disruptions in smell (such as strange odors), taste or touch. Other symptoms include:  · Numbness.   · A \"pins and needles\" sensation.   · Difficulty in recalling or speaking the correct word.   These neurological events may last as long as 60 minutes. These symptoms will fade as the headache begins.  Q: What is a trigger?  A: Certain physical or environmental factors can lead to or \"trigger\" a migraine. These include:  · Foods.   · Hormonal changes.   · Weather.   · Stress.   It is important to remember that triggers are different for everyone. To help prevent migraine attacks, you need to figure out which triggers affect you. Keep a headache diary. This is a good way to track triggers. The diary will help you talk to your healthcare professional about your condition.  Q: Does weather affect migraines?  A: Bright sunshine, hot, humid conditions, and drastic changes in barometric pressure may lead to, or \"trigger,\" a migraine attack in some people. But studies have shown that weather does not act as a trigger for everyone with migraines.  Q: What is the link between migraine and hormones?  A: Hormones start and regulate many of your body's functions. Hormones keep your body in balance within a constantly changing environment. The levels of hormones in your body are unbalanced at times. Examples are during menstruation, pregnancy, or menopause. That can lead to a migraine attack. In fact, about three quarters of all women with migraine report that their attacks are related to the menstrual cycle.   Q: Is there an increased risk of stroke for migraine sufferers?  A: The likelihood of a migraine " "attack causing a stroke is very remote. That is not to say that migraine sufferers cannot have a stroke associated with their migraines. In persons under age 40, the most common associated factor for stroke is migraine headache. But over the course of a person's normal life span, the occurrence of migraine headache may actually be associated with a reduced risk of dying from cerebrovascular disease due to stroke.   Q: What are acute medications for migraine?  A: Acute medications are used to treat the pain of the headache after it has started. Examples over-the-counter medications, NSAIDs, ergots, and triptans.   Q: What are the triptans?  A: Triptans are the newest class of abortive medications. They are specifically targeted to treat migraine. Triptans are vasoconstrictors. They moderate some chemical reactions in the brain. The triptans work on receptors in your brain. Triptans help to restore the balance of a neurotransmitter called serotonin. Fluctuations in levels of serotonin are thought to be a main cause of migraine.   Q: Are over-the-counter medications for migraine effective?  A: Over-the-counter, or \"OTC,\" medications may be effective in relieving mild to moderate pain and associated symptoms of migraine. But you should see your caregiver before beginning any treatment regimen for migraine.   Q: What are preventive medications for migraine?  A: Preventive medications for migraine are sometimes referred to as \"prophylactic\" treatments. They are used to reduce the frequency, severity, and length of migraine attacks. Examples of preventive medications include antiepileptic medications, antidepressants, beta-blockers, calcium channel blockers, and NSAIDs (nonsteroidal anti-inflammatory drugs).  Q: Why are anticonvulsants used to treat migraine?  A: During the past few years, there has been an increased interest in antiepileptic drugs for the prevention of migraine. They are sometimes referred to as " "\"anticonvulsants\". Both epilepsy and migraine may be caused by similar reactions in the brain.   Q: Why are antidepressants used to treat migraine?  A: Antidepressants are typically used to treat people with depression. They may reduce migraine frequency by regulating chemical levels, such as serotonin, in the brain.   Q: What alternative therapies are used to treat migraine?  A: The term \"alternative therapies\" is often used to describe treatments considered outside the scope of conventional Western medicine. Examples of alternative therapy include acupuncture, acupressure, and yoga. Another common alternative treatment is herbal therapy. Some herbs are believed to relieve headache pain. Always discuss alternative therapies with your caregiver before proceeding. Some herbal products contain arsenic and other toxins.  TENSION HEADACHES  Q: What is a tension-type headache? What causes it? How can I treat it?  A: Tension-type headaches occur randomly. They are often the result of temporary stress, anxiety, fatigue, or anger. Symptoms include soreness in your temples, a tightening band-like sensation around your head (a \"vice-like\" ache). Symptoms can also include a pulling feeling, pressure sensations, and brent head and neck muscles. The headache begins in your forehead, temples, or the back of your head and neck. Treatment for tension-type headache may include over-the-counter or prescription medications. Treatment may also include self-help techniques such as relaxation training and biofeedback.  CLUSTER HEADACHES  Q: What is a cluster headache? What causes it? How can I treat it?  A: Cluster headache gets its name because the attacks come in groups. The pain arrives with little, if any, warning. It is usually on one side of the head. A tearing or bloodshot eye and a runny nose on the same side of the headache may also accompany the pain. Cluster headaches are believed to be caused by chemical reactions in " "the brain. They have been described as the most severe and intense of any headache type. Treatment for cluster headache includes prescription medication and oxygen.  SINUS HEADACHES  Q: What is a sinus headache? What causes it? How can I treat it?  A: When a cavity in the bones of the face and skull (a sinus) becomes inflamed, the inflammation will cause localized pain. This condition is usually the result of an allergic reaction, a tumor, or an infection. If your headache is caused by a sinus blockage, such as an infection, you will probably have a fever. An x-ray will confirm a sinus blockage. Your caregiver's treatment might include antibiotics for the infection, as well as antihistamines or decongestants.   REBOUND HEADACHES  Q: What is a rebound headache? What causes it? How can I treat it?  A: A pattern of taking acute headache medications too often can lead to a condition known as \"rebound headache.\" A pattern of taking too much headache medication includes taking it more than 2 days per week or in excessive amounts. That means more than the label or a caregiver advises. With rebound headaches, your medications not only stop relieving pain, they actually begin to cause headaches. Doctors treat rebound headache by tapering the medication that is being overused. Sometimes your caregiver will gradually substitute a different type of treatment or medication. Stopping may be a challenge. Regularly overusing a medication increases the potential for serious side effects. Consult a caregiver if you regularly use headache medications more than 2 days per week or more than the label advises.  ADDITIONAL QUESTIONS AND ANSWERS  Q: What is biofeedback?  A: Biofeedback is a self-help treatment. Biofeedback uses special equipment to monitor your body's involuntary physical responses. Biofeedback monitors:  · Breathing.   · Pulse.   · Heart rate.   · Temperature.   · Muscle tension.   · Brain activity.   Biofeedback helps " you refine and perfect your relaxation exercises. You learn to control the physical responses that are related to stress. Once the technique has been mastered, you do not need the equipment any more.  Q: Are headaches hereditary?  A: Four out of five (80%) of people that suffer report a family history of migraine. Scientists are not sure if this is genetic or a family predisposition. Despite the uncertainty, a child has a 50% chance of having migraine if one parent suffers. The child has a 75% chance if both parents suffer.   Q: Can children get headaches?  A: By the time they reach high school, most young people have experienced some type of headache. Many safe and effective approaches or medications can prevent a headache from occurring or stop it after it has begun.   Q: What type of doctor should I see to diagnose and treat my headache?  A: Start with your primary caregiver. Discuss his or her experience and approach to headaches. Discuss methods of classification, diagnosis, and treatment. Your caregiver may decide to recommend you to a headache specialist, depending upon your symptoms or other physical conditions. Having diabetes, allergies, etc., may require a more comprehensive and inclusive approach to your headache. The National Headache Foundation will provide, upon request, a list of NHF physician members in your state.  Document Released: 03/09/2005 Document Revised: 03/11/2013 Document Reviewed: 08/17/2009  [employer]® Patient Information ©2013 [employer].          Patient Information     Patient Information    Following emergency treatment: all patient requiring follow-up care must return either to a private physician or a clinic if your condition worsens before you are able to obtain further medical attention, please return to the emergency room.     Billing Information    At [facility], we work to make the billing process streamlined for our patients.  Our Representatives are here to answer any  questions you may have regarding your hospital bill.  If you have insurance coverage and have supplied your insurance information to us, we will submit a claim to your insurer on your behalf.  Should you have any questions regarding your bill, we can be reached online or by phone as follows:  Online: You are able pay your bills online or live chat with our representatives about any billing questions you may have. We are here to help Monday - Friday from 8:00am to 7:30pm and 9:00am - 12:00pm on Saturdays.  Please visit [email or web address]  for more information.   Phone:  [phone] or [phone]    Please note that your emergency physician, surgeon, pathologist, radiologist, anesthesiologist, and other specialists are not employed by [facility] and will therefore bill separately for their services.  Please contact them directly for any questions concerning their bills at the numbers below:     Emergency Physician Services:  [phone]  [facility]:  [phone]  [facility]:  [phone]  [facility]:  [phone]    1. Your final bill may vary from the amount quoted upon discharge if all procedures are not complete at that time, or if your doctor has additional procedures of which we are not aware. You will receive an additional bill if you return to the Emergency Department at [facility] for suture removal regardless of the facility of which the sutures were placed.     2. Please arrange for settlement of this account at the emergency registration.    3. All self-pay accounts are due in full at the time of treatment.  If you are unable to meet this obligation then payment is expected within 4-5 days.     4. If you have had radiology studies (CT, X-ray, Ultrasound, MRI), you have received a preliminary result during your emergency department visit. Please contact the radiology department [phone] to receive a copy of  your final result. Please discuss the Final result with your primary physician or with the follow up physician provided.     Crisis Hotline:  [place] Crisis Hotline:  3-554-SCERYVB or 1-295.300.7669  [place] Crisis Hotline:    1-231.711.6020 or 600-526-3687         ED Discharge Follow Up Questions    1. In order to provide you with very good care, we would like to follow up with a phone call in the next few days.  May we have your permission to contact you?     YES /  NO    2. What is the best phone number to call you? (       )_____-__________    3. What is the best time to call you?      Morning  /  Afternoon  /  Evening                   Patient Signature:  ____________________________________________________________    Date:  ____________________________________________________________

## 2017-01-12 NOTE — ED NOTES
Chief Complaint   Patient presents with   • Head Ache     x6days     Pt ambulated to triage, headache x6days not relief by Excedrin.   + light sensitivity.

## 2017-01-28 ENCOUNTER — APPOINTMENT (OUTPATIENT)
Dept: RADIOLOGY | Facility: MEDICAL CENTER | Age: 37
End: 2017-01-28
Attending: EMERGENCY MEDICINE
Payer: MEDICAID

## 2017-01-28 ENCOUNTER — HOSPITAL ENCOUNTER (OUTPATIENT)
Facility: MEDICAL CENTER | Age: 37
End: 2017-02-01
Attending: EMERGENCY MEDICINE | Admitting: INTERNAL MEDICINE
Payer: MEDICAID

## 2017-01-28 ENCOUNTER — RESOLUTE PROFESSIONAL BILLING HOSPITAL PROF FEE (OUTPATIENT)
Dept: HOSPITALIST | Facility: MEDICAL CENTER | Age: 37
End: 2017-01-28
Payer: MEDICAID

## 2017-01-28 DIAGNOSIS — R53.1 RIGHT SIDED WEAKNESS: ICD-10-CM

## 2017-01-28 LAB
ALBUMIN SERPL BCP-MCNC: 4.9 G/DL (ref 3.2–4.9)
ALBUMIN/GLOB SERPL: 2 G/DL
ALP SERPL-CCNC: 54 U/L (ref 30–99)
ALT SERPL-CCNC: 18 U/L (ref 2–50)
ANION GAP SERPL CALC-SCNC: 6 MMOL/L (ref 0–11.9)
APPEARANCE UR: CLEAR
APTT PPP: 28.1 SEC (ref 24.7–36)
AST SERPL-CCNC: 18 U/L (ref 12–45)
BASOPHILS # BLD AUTO: 0.9 % (ref 0–1.8)
BASOPHILS # BLD: 0.08 K/UL (ref 0–0.12)
BILIRUB SERPL-MCNC: 0.4 MG/DL (ref 0.1–1.5)
BILIRUB UR QL STRIP.AUTO: NEGATIVE
BUN SERPL-MCNC: 20 MG/DL (ref 8–22)
CALCIUM SERPL-MCNC: 9.4 MG/DL (ref 8.5–10.5)
CHLORIDE SERPL-SCNC: 104 MMOL/L (ref 96–112)
CO2 SERPL-SCNC: 27 MMOL/L (ref 20–33)
COLOR UR: NORMAL
CREAT SERPL-MCNC: 0.88 MG/DL (ref 0.5–1.4)
EKG IMPRESSION: NORMAL
EOSINOPHIL # BLD AUTO: 0.16 K/UL (ref 0–0.51)
EOSINOPHIL NFR BLD: 1.8 % (ref 0–6.9)
ERYTHROCYTE [DISTWIDTH] IN BLOOD BY AUTOMATED COUNT: 44.8 FL (ref 35.9–50)
GFR SERPL CREATININE-BSD FRML MDRD: >60 ML/MIN/1.73 M 2
GLOBULIN SER CALC-MCNC: 2.4 G/DL (ref 1.9–3.5)
GLUCOSE SERPL-MCNC: 134 MG/DL (ref 65–99)
GLUCOSE UR STRIP.AUTO-MCNC: NEGATIVE MG/DL
HCT VFR BLD AUTO: 43.6 % (ref 42–52)
HGB BLD-MCNC: 14.3 G/DL (ref 14–18)
IMM GRANULOCYTES # BLD AUTO: 0.09 K/UL (ref 0–0.11)
IMM GRANULOCYTES NFR BLD AUTO: 1 % (ref 0–0.9)
INR PPP: 0.9 (ref 0.87–1.13)
KETONES UR STRIP.AUTO-MCNC: NEGATIVE MG/DL
LEUKOCYTE ESTERASE UR QL STRIP.AUTO: NEGATIVE
LYMPHOCYTES # BLD AUTO: 3.06 K/UL (ref 1–4.8)
LYMPHOCYTES NFR BLD: 35.2 % (ref 22–41)
MCH RBC QN AUTO: 30.3 PG (ref 27–33)
MCHC RBC AUTO-ENTMCNC: 32.8 G/DL (ref 33.7–35.3)
MCV RBC AUTO: 92.4 FL (ref 81.4–97.8)
MICRO URNS: NORMAL
MONOCYTES # BLD AUTO: 0.92 K/UL (ref 0–0.85)
MONOCYTES NFR BLD AUTO: 10.6 % (ref 0–13.4)
NEUTROPHILS # BLD AUTO: 4.39 K/UL (ref 1.82–7.42)
NEUTROPHILS NFR BLD: 50.5 % (ref 44–72)
NITRITE UR QL STRIP.AUTO: NEGATIVE
NRBC # BLD AUTO: 0 K/UL
NRBC BLD AUTO-RTO: 0 /100 WBC
PH UR STRIP.AUTO: 7 [PH]
PLATELET # BLD AUTO: 295 K/UL (ref 164–446)
PMV BLD AUTO: 10.6 FL (ref 9–12.9)
POTASSIUM SERPL-SCNC: 4.1 MMOL/L (ref 3.6–5.5)
PROT SERPL-MCNC: 7.3 G/DL (ref 6–8.2)
PROT UR QL STRIP: NEGATIVE MG/DL
PROTHROMBIN TIME: 12.4 SEC (ref 12–14.6)
RBC # BLD AUTO: 4.72 M/UL (ref 4.7–6.1)
RBC UR QL AUTO: NEGATIVE
SODIUM SERPL-SCNC: 137 MMOL/L (ref 135–145)
SP GR UR STRIP.AUTO: 1.03
TROPONIN I SERPL-MCNC: <0.01 NG/ML (ref 0–0.04)
TSH SERPL DL<=0.005 MIU/L-ACNC: 12.44 UIU/ML (ref 0.3–3.7)
VALPROATE SERPL-MCNC: 42.3 UG/ML (ref 50–100)
WBC # BLD AUTO: 8.7 K/UL (ref 4.8–10.8)

## 2017-01-28 PROCEDURE — 71010 DX-CHEST-LIMITED (1 VIEW): CPT

## 2017-01-28 PROCEDURE — 85025 COMPLETE CBC W/AUTO DIFF WBC: CPT

## 2017-01-28 PROCEDURE — 36415 COLL VENOUS BLD VENIPUNCTURE: CPT

## 2017-01-28 PROCEDURE — 70450 CT HEAD/BRAIN W/O DYE: CPT

## 2017-01-28 PROCEDURE — 85730 THROMBOPLASTIN TIME PARTIAL: CPT

## 2017-01-28 PROCEDURE — A9270 NON-COVERED ITEM OR SERVICE: HCPCS | Performed by: INTERNAL MEDICINE

## 2017-01-28 PROCEDURE — 70496 CT ANGIOGRAPHY HEAD: CPT

## 2017-01-28 PROCEDURE — 80164 ASSAY DIPROPYLACETIC ACD TOT: CPT

## 2017-01-28 PROCEDURE — 84484 ASSAY OF TROPONIN QUANT: CPT

## 2017-01-28 PROCEDURE — 81003 URINALYSIS AUTO W/O SCOPE: CPT

## 2017-01-28 PROCEDURE — G0378 HOSPITAL OBSERVATION PER HR: HCPCS

## 2017-01-28 PROCEDURE — 80053 COMPREHEN METABOLIC PANEL: CPT

## 2017-01-28 PROCEDURE — 700102 HCHG RX REV CODE 250 W/ 637 OVERRIDE(OP): Performed by: INTERNAL MEDICINE

## 2017-01-28 PROCEDURE — 93005 ELECTROCARDIOGRAM TRACING: CPT

## 2017-01-28 PROCEDURE — 99285 EMERGENCY DEPT VISIT HI MDM: CPT

## 2017-01-28 PROCEDURE — 95951 EEG: CPT | Mod: 52

## 2017-01-28 PROCEDURE — 84443 ASSAY THYROID STIM HORMONE: CPT

## 2017-01-28 PROCEDURE — 700102 HCHG RX REV CODE 250 W/ 637 OVERRIDE(OP): Performed by: PSYCHIATRY & NEUROLOGY

## 2017-01-28 PROCEDURE — 70498 CT ANGIOGRAPHY NECK: CPT

## 2017-01-28 PROCEDURE — 85610 PROTHROMBIN TIME: CPT

## 2017-01-28 PROCEDURE — 99220 PR INITIAL OBSERVATION CARE,LEVL III: CPT | Performed by: INTERNAL MEDICINE

## 2017-01-28 PROCEDURE — A9270 NON-COVERED ITEM OR SERVICE: HCPCS | Performed by: PSYCHIATRY & NEUROLOGY

## 2017-01-28 RX ORDER — ONDANSETRON 2 MG/ML
4 INJECTION INTRAMUSCULAR; INTRAVENOUS EVERY 4 HOURS PRN
Status: DISCONTINUED | OUTPATIENT
Start: 2017-01-28 | End: 2017-02-01 | Stop reason: HOSPADM

## 2017-01-28 RX ORDER — ONDANSETRON 4 MG/1
4 TABLET, ORALLY DISINTEGRATING ORAL EVERY 4 HOURS PRN
Status: DISCONTINUED | OUTPATIENT
Start: 2017-01-28 | End: 2017-02-01 | Stop reason: HOSPADM

## 2017-01-28 RX ORDER — ENEMA 19; 7 G/133ML; G/133ML
1 ENEMA RECTAL
Status: DISCONTINUED | OUTPATIENT
Start: 2017-01-28 | End: 2017-02-01 | Stop reason: HOSPADM

## 2017-01-28 RX ORDER — LEVOTHYROXINE SODIUM 0.12 MG/1
125 TABLET ORAL
Status: DISCONTINUED | OUTPATIENT
Start: 2017-01-29 | End: 2017-01-28

## 2017-01-28 RX ORDER — DIVALPROEX SODIUM 500 MG/1
1000 TABLET, EXTENDED RELEASE ORAL EVERY EVENING
Status: DISCONTINUED | OUTPATIENT
Start: 2017-01-28 | End: 2017-01-28

## 2017-01-28 RX ORDER — DIVALPROEX SODIUM 500 MG/1
1000 TABLET, EXTENDED RELEASE ORAL EVERY EVENING
Status: ON HOLD | COMMUNITY
End: 2017-02-01

## 2017-01-28 RX ORDER — AMOXICILLIN 250 MG
1 CAPSULE ORAL NIGHTLY
Status: DISCONTINUED | OUTPATIENT
Start: 2017-01-28 | End: 2017-02-01 | Stop reason: HOSPADM

## 2017-01-28 RX ORDER — PROMETHAZINE HYDROCHLORIDE 25 MG/1
12.5-25 TABLET ORAL EVERY 4 HOURS PRN
Status: DISCONTINUED | OUTPATIENT
Start: 2017-01-28 | End: 2017-02-01 | Stop reason: HOSPADM

## 2017-01-28 RX ORDER — BISACODYL 10 MG
10 SUPPOSITORY, RECTAL RECTAL
Status: DISCONTINUED | OUTPATIENT
Start: 2017-01-28 | End: 2017-02-01 | Stop reason: HOSPADM

## 2017-01-28 RX ORDER — AMOXICILLIN 250 MG
1 CAPSULE ORAL
Status: DISCONTINUED | OUTPATIENT
Start: 2017-01-28 | End: 2017-02-01 | Stop reason: HOSPADM

## 2017-01-28 RX ORDER — LEVOTHYROXINE SODIUM 0.15 MG/1
150 TABLET ORAL
Status: DISCONTINUED | OUTPATIENT
Start: 2017-01-29 | End: 2017-02-01 | Stop reason: HOSPADM

## 2017-01-28 RX ORDER — LABETALOL HYDROCHLORIDE 5 MG/ML
10 INJECTION, SOLUTION INTRAVENOUS EVERY 4 HOURS PRN
Status: DISCONTINUED | OUTPATIENT
Start: 2017-01-28 | End: 2017-02-01 | Stop reason: HOSPADM

## 2017-01-28 RX ORDER — DIVALPROEX SODIUM 500 MG/1
1500 TABLET, EXTENDED RELEASE ORAL
Status: DISCONTINUED | OUTPATIENT
Start: 2017-01-28 | End: 2017-02-01 | Stop reason: HOSPADM

## 2017-01-28 RX ORDER — PRAZOSIN HYDROCHLORIDE 2 MG/1
2 CAPSULE ORAL NIGHTLY
Status: DISCONTINUED | OUTPATIENT
Start: 2017-01-28 | End: 2017-02-01 | Stop reason: HOSPADM

## 2017-01-28 RX ORDER — SERTRALINE HYDROCHLORIDE 100 MG/1
100 TABLET, FILM COATED ORAL EVERY MORNING
Status: DISCONTINUED | OUTPATIENT
Start: 2017-01-29 | End: 2017-02-01 | Stop reason: HOSPADM

## 2017-01-28 RX ORDER — PRAZOSIN HYDROCHLORIDE 2 MG/1
2 CAPSULE ORAL NIGHTLY
COMMUNITY
End: 2017-08-24

## 2017-01-28 RX ORDER — LACTULOSE 20 G/30ML
30 SOLUTION ORAL
Status: DISCONTINUED | OUTPATIENT
Start: 2017-01-28 | End: 2017-02-01 | Stop reason: HOSPADM

## 2017-01-28 RX ORDER — DOCUSATE SODIUM 100 MG/1
100 CAPSULE, LIQUID FILLED ORAL EVERY MORNING
Status: DISCONTINUED | OUTPATIENT
Start: 2017-01-29 | End: 2017-02-01 | Stop reason: HOSPADM

## 2017-01-28 RX ORDER — PROMETHAZINE HYDROCHLORIDE 25 MG/1
12.5-25 SUPPOSITORY RECTAL EVERY 4 HOURS PRN
Status: DISCONTINUED | OUTPATIENT
Start: 2017-01-28 | End: 2017-02-01 | Stop reason: HOSPADM

## 2017-01-28 RX ADMIN — DIVALPROEX SODIUM 1500 MG: 500 TABLET, FILM COATED, EXTENDED RELEASE ORAL at 21:05

## 2017-01-28 RX ADMIN — STANDARDIZED SENNA CONCENTRATE AND DOCUSATE SODIUM 1 TABLET: 8.6; 5 TABLET, FILM COATED ORAL at 21:05

## 2017-01-28 RX ADMIN — PRAZOSIN HYDROCHLORIDE 2 MG: 2 CAPSULE ORAL at 22:30

## 2017-01-28 ASSESSMENT — LIFESTYLE VARIABLES
TOTAL SCORE: 0
EVER HAD A DRINK FIRST THING IN THE MORNING TO STEADY YOUR NERVES TO GET RID OF A HANGOVER: NO
HAVE YOU EVER FELT YOU SHOULD CUT DOWN ON YOUR DRINKING: NO
ON A TYPICAL DAY WHEN YOU DRINK ALCOHOL HOW MANY DRINKS DO YOU HAVE: 0
EVER FELT BAD OR GUILTY ABOUT YOUR DRINKING: NO
ALCOHOL_USE: YES
AVERAGE NUMBER OF DAYS PER WEEK YOU HAVE A DRINK CONTAINING ALCOHOL: 0
TOTAL SCORE: 0
TOTAL SCORE: 0
EVER_SMOKED: YES
CONSUMPTION TOTAL: NEGATIVE
HOW MANY TIMES IN THE PAST YEAR HAVE YOU HAD 5 OR MORE DRINKS IN A DAY: 0
HAVE PEOPLE ANNOYED YOU BY CRITICIZING YOUR DRINKING: NO

## 2017-01-28 ASSESSMENT — PAIN SCALES - GENERAL
PAINLEVEL_OUTOF10: 0
PAINLEVEL_OUTOF10: 0

## 2017-01-28 NOTE — IP AVS SNAPSHOT
" <p align=\"LEFT\"><IMG SRC=\"//[email or web address]\" alt=\"Image\" WIDTH=\"50%\" HEIGHT=\"200\" BORDER=\"\"></p>                   Name:Norm Prabhakar  Medical Record Number:[id number]  CSN: [id number]    YOB: 1980   Age: 36 y.o.  Sex: male  HT:1.956 m (6' 5\") WT: 142.5 kg (314 lb 2.5 oz)          Admit Date: 1/28/2017     Discharge Date:   Today's Date: 2/1/2017  Attending Doctor:  Gretchen Galvan D.O.                  Allergies:  Abilify and Fish          Follow-up Information     1. Follow up with Long Linares M.D..    Specialty:  Family Medicine    Contact information    [address] [place] [id number]  [phone]           Medication List      Take these Medications        Instructions    divalproex  MG Tb24   Commonly known as:  DEPAKOTE ER    Take 3 Tabs by mouth every evening.   Dose:  1500 mg       levothyroxine 125 MCG Tabs   Commonly known as:  SYNTHROID    Take 125 mcg by mouth Every morning on an empty stomach.   Dose:  125 mcg       prazosin 2 MG Caps   Commonly known as:  MINIPRESS    Take 2 mg by mouth every evening.   Dose:  2 mg       sertraline 100 MG Tabs   Commonly known as:  ZOLOFT    Take 100 mg by mouth every morning.   Dose:  100 mg         "

## 2017-01-28 NOTE — ED NOTES
Pt bib EMS from group home.  Chief Complaint   Patient presents with   • Unilateral Weakness     rt arm and leg, while sitting playing video games at 1230     CT complete.  Pt to [facility].  Chart up for ERP.

## 2017-01-28 NOTE — CONSULTS
NEUROLOGY CONSULT.     Cx requested by Dr. Fuentes, possible stroke.    35 yo male with frequent ED visits for multiple reasons, h/o encephalopathy since childhood and epilepsy. H/o abnormal EEG. Has had multiple admission for right sided weakness / paresthesias. Lives at group home. Brought in today for yet another episode of right sided weakness and numbness/tingling. This started about one hour ago, not improving per pt. Denies any recent seizures, including no jerking or stiffening of the right side. Did not lose consciousness and was not confused. Says he has these episodes all the time. Has been worked up here for similar presentations in the past, with negative MRI brain for stroke (although MRI significantly abnormal) and diagnosed with suspected conversion disorder. He takes depakote daily but does not know dose. He states he is compliant. Dose confirmed by RN with group home of Depakote ER 1000 mg po qhs.     He denies any other complaints.     He has frequent headaches but not today.     No recent fevers or trauma. No neck pain.     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     He does not know whether there is a family history of epilepsy.     He denied smoking.     Examination:   I found him on his gurney, watching TV without any distress. He did not want to be interrupted as he was watching his favorite movie, he stated.   He was awake, fully alert and oriented.   Speech was minimally dysarthric.   Neck supple.   CV: RRR, no murmurs.   Skin exam showed no signs of trauma or rash.   He appeared to have recollection of recent and remote events.   He was able to comprehend and followed commands.   His eyes were midline, there is correctopia on the left and he is blind on this eye. Otherwise, his visual field is intact on the right eye.   Face appeared symmetric at first, there were inconsistencies on exam.   Tongue was midline, no tongue biting.   He exhibited a dense flaccid hemiplegia. Left sided extremities showed  normal strength.   He appeared to perceive touch throughout but indicated it was less on the right side.   Reflexes were 2+, with absent ankle jerks.   Plantars were flexor.       CTB was negative for acute process.     MRI brain wo 11/29/16, which I personally reviewed, shows a leukodystrophy with diffuse white matter disease but no acute infarct at that time.     Labs done show a subtherapeutic depakote level today as well as on prior admissions.     ASSESSMENT AND PLAN:   Recurrent admissions for right sided weakness and paresthesias with negative work up.   There are inconsistencies on exam, the possibility of conversion disorder has been entertained in the past.   Given history and exam findings, not suggestive of stroke, not a tpa candidate. Possible seizure vs conversion disorder.   It would be rare to have a Santosh's paralysis without a motor seizure, however, today's EEG was significant abnormal with continuous epileptiform discharges in the posterior regions, sometimes with brief periodic and rhythmic evolution but without clear evidence for seizures. I recommend increasing Depakote ER to 1500 mg po qhs.   His baseline MRI brain is significantly abnormal with a severe leukodystrophy.   No additional work up during inpatient.   He should follow up in my clinic after discharge and would benefit from elective prolonged EEG to assess OUTPT therapeutic response to increase in depakote.   He should be evaluated by PT prior to discharge.   Recommend observation overnight and possible discharge tomorrow.   We will sign off at this time.     Discussed with ED physician and RN.     Spent 60 mins in the care of this pt.

## 2017-01-28 NOTE — IP AVS SNAPSHOT
" After Visit Summary                                                                                                                  Name:Norm Prabhakar  Medical Record Number:[id number]  CSN: [id number]    YOB: 1980   Age: 36 y.o.  Sex: male  HT:1.956 m (6' 5\") WT: 142.5 kg (314 lb 2.5 oz)          Admit Date: 1/28/2017     Discharge Date:   Today's Date: 2/1/2017  Attending Doctor:  Gretchen Galvan D.O.                  Allergies:  Abilify and Fish            Discharge Instructions       Discharge Instructions    Discharged to group home by car with escort. Discharged via wheelchair, hospital escort: Yes.  Special equipment needed: Not Applicable    Be sure to schedule a follow-up appointment with your primary care doctor or any specialists as instructed.     Discharge Plan:   Influenza Vaccine Indication: Not indicated: Previously immunized this influenza season and > 8 years of age    I understand that a diet low in cholesterol, fat, and sodium is recommended for good health. Unless I have been given specific instructions below for another diet, I accept this instruction as my diet prescription.   Other diet: Regular    Special Instructions: None    · Is patient discharged on Warfarin / Coumadin?   No     · Is patient Post Blood Transfusion?  No    Depression / Suicide Risk    As you are discharged from this [facility] facility, it is important to learn how to keep safe from harming yourself.    Recognize the warning signs:  · Abrupt changes in personality, positive or negative- including increase in energy   · Giving away possessions  · Change in eating patterns- significant weight changes-  positive or negative  · Change in sleeping patterns- unable to sleep or sleeping all the time   · Unwillingness or inability to communicate  · Depression  · Unusual sadness, discouragement and loneliness  · Talk of wanting to die  · Neglect of personal appearance   · Rebelliousness- reckless " behavior  · Withdrawal from people/activities they love  · Confusion- inability to concentrate     If you or a loved one observes any of these behaviors or has concerns about self-harm, here's what you can do:  · Talk about it- your feelings and reasons for harming yourself  · Remove any means that you might use to hurt yourself (examples: pills, rope, extension cords, firearm)  · Get professional help from the community (Mental Health, Substance Abuse, psychological counseling)  · Do not be alone:Call your Safe Contact- someone whom you trust who will be there for you.  · Call your local CRISIS HOTLINE 380-7820 or 235-435-0367  · Call your local Children's Mobile Crisis Response Team [place] (772) 604-5332 or www.Firetide  · Call the toll free National Suicide Prevention Hotlines   · National Suicide Prevention Lifeline 683-914-LEWD (5356)  · Booster Line Network 800-IBXMFDW (655-5260)      Conversion Disorder  Conversion disorder is also known as functional neurological symptom disorder. People with this mental disorder have symptoms that suggest a brain or nervous system condition, such as a stroke or seizure. However, no such condition can be found to explain the symptoms. For people with conversion disorder, the symptoms may result in:  · Severe distress or anxiety.  · Disruption of relationships or other normal life activities.  · Medical evaluation. This often occurs in the primary care or emergency room setting.  Conversion disorder may begin anytime from late childhood through the young adult years. This disorder is more common in females than males.   CAUSES  The exact cause of this disorder is unknown. It is often triggered by stressful life events involving personal relationships.  SIGNS AND SYMPTOMS  Signs and symptoms of conversion disorder may include:  · Muscle weakness or paralysis. If this involves the bladder muscle, it can cause difficulty urinating.  · Seizures or attacks of  being unresponsive.  · Abnormal movement, such as tremors, jerks, muscle spasms, loss of balance, or difficulty walking.  · Difficulty swallowing or a feeling of having a lump in the throat.  · Loss of speech (aphonia) or slurring of words (dysarthria).  · Loss of the sensation of touch or pain.  · Changes in vision, such as blindness or double vision.  · Seeing things that are not there (hallucinations).  · Changes in hearing, such as deafness.  Symptoms usually occur suddenly but may increase gradually over time. They usually go away completely in a short time. Seizures and tremors are more likely than other conversion symptoms to come back or continue.  DIAGNOSIS  Diagnosis of this disorder starts with an evaluation by your health care provider. Exams and tests will be done to rule out serious physical health problems. The evaluation will vary depending on your specific symptoms. It may include:  · A physical exam, including a neurological exam.  · Lab tests on blood or urine samples.  · X-rays or other imaging studies.  · An electroencephalogram (EEG) to monitor brain waves for seizure activity.  Your health care provider may refer you to a mental health specialist for psychological evaluation.  TREATMENT  The main goal of treatment is to get the symptoms to go away as quickly as possible. Most people with this disorder respond well to relaxation techniques and reassurance from their health care provider that the symptoms are stress related. For people with symptoms that do not go away, the following treatment options are available:  · Counseling or talk therapy. Talk therapy is provided by mental health specialists. It involves discussing stressful life events that may have triggered the symptoms and ways to cope with these issues.  · Hypnotherapy. This is the use of hypnosis to help a person overcome conversion symptoms. It is provided by mental health specialists.  · Amobarbital interview. This involves  discussing stressful life events that may have triggered the symptoms. The mental health specialist asks you questions after you take a short-acting medicine (amobarbital) that produces a hypnotic state.  · Medicine. Antidepressant medicine may be helpful even if a person with conversion symptoms is not depressed.  · Physical therapy. Physical therapy can help maintain muscle strength in cases of long-term paralysis.  HOME CARE INSTRUCTIONS  · Take medicines only as directed by your health care provider.  · Try to reduce stress.  · Keep all follow-up visits as directed by your health care provider. This is important.  SEEK MEDICAL CARE IF:  · Your symptoms do not go away or they become severe.  · You develop new symptoms.  SEEK IMMEDIATE MEDICAL CARE IF:  You have serious thoughts about hurting yourself or someone else.     This information is not intended to replace advice given to you by your health care provider. Make sure you discuss any questions you have with your health care provider.     Document Released: 01/20/2012 Document Revised: 01/08/2016 Document Reviewed: 04/30/2015  Mailpile Interactive Patient Education ©2016 [employer]          Follow-up Information     1. Follow up with Long Linares M.D..    Specialty:  Family Medicine    Contact information    [facility] [address]  [phone]           Discharge Medication Instructions:    Below are the medications your physician expects you to take upon discharge:    Review all your home medications and newly ordered medications with your doctor and/or pharmacist. Follow medication instructions as directed by your doctor and/or pharmacist.    Please keep your medication list with you and share with your physician.               Medication List      START taking these medications        Instructions    divalproex  MG Tb24   Last time this was given:  1,500 mg on 1/31/2017  8:16 PM   Commonly known as:  DEPAKOTE ER   Next  Dose Due:  2/1 evening    Take 3 Tabs by mouth every evening.   Dose:  1500 mg         CONTINUE taking these medications        Instructions    levothyroxine 125 MCG Tabs   Last time this was given:  150 mcg on 2/1/2017  5:12 AM   Commonly known as:  SYNTHROID   Next Dose Due:  2/2    Take 125 mcg by mouth Every morning on an empty stomach.   Dose:  125 mcg       prazosin 2 MG Caps   Last time this was given:  2 mg on 1/31/2017  8:18 PM   Commonly known as:  MINIPRESS   Next Dose Due:  2/1 evening    Take 2 mg by mouth every evening.   Dose:  2 mg       sertraline 100 MG Tabs   Last time this was given:  100 mg on 2/1/2017  8:23 AM   Commonly known as:  ZOLOFT   Next Dose Due:  2/2    Take 100 mg by mouth every morning.   Dose:  100 mg               Instructions           Diet / Nutrition:    Follow any diet instructions given to you by your doctor or the dietician, including how much salt (sodium) you are allowed each day.    If you are overweight, talk to your doctor about a weight reduction plan.    Activity:    Remain physically active following your doctor's instructions about exercise and activity.    Rest often.     Any time you become even a little tired or short of breath, SIT DOWN and rest.    Worsening Symptoms:    Report any of the following signs and symptoms to the doctor's office immediately:    *Pain of jaw, arm, or neck  *Chest pain not relieved by medication                               *Dizziness or loss of consciousness  *Difficulty breathing even when at rest   *More tired than usual                                       *Bleeding drainage or swelling of surgical site  *Swelling of feet, ankles, legs or stomach                 *Fever (>100ºF)  *Pink or blood tinged sputum  *Weight gain (3lbs/day or 5lbs /week)           *Shock from internal defibrillator (if applicable)  *Palpitations or irregular heartbeats                *Cool and/or numb extremities    Stroke Awareness    Common Risk Factors  for Stroke include:    Age  Atrial Fibrillation  Carotid Artery Stenosis  Diabetes Mellitus  Excessive alcohol consumption  High blood pressure  Overweight   Physical inactivity  Smoking    Warning signs and symptoms of a stroke include:    *Sudden numbness or weakness of the face, arm or leg (especially on one side of the body).  *Sudden confusion, trouble speaking or understanding.  *Sudden trouble seeing in one or both eyes.  *Sudden trouble walking, dizziness, loss of balance or coordination.Sudden severe headache with no known cause.    It is very important to get treatment quickly when a stroke occurs. If you experience any of the above warning signs, call 911 immediately.                   Disclaimer         Quit Smoking / Tobacco Use:    I understand the use of any tobacco products increases my chance of suffering from future heart disease or stroke and could cause other illnesses which may shorten my life. Quitting the use of tobacco products is the single most important thing I can do to improve my health. For further information on smoking / tobacco cessation call a Toll Free Quit Line at 1-962.620.9874 (*National Cancer [place]) or 1-434.548.9238 (American Lung Association) or you can access the web based program at www.lungusa.org.    [place] Tobacco Users Help Line:  (619) 438-7709       Toll Free: 1-314.979.5070  Quit Tobacco Program [facility] (357)596-4709    Crisis Hotline:    [place] Crisis Hotline:  0-503-ETQBGOK or 1-148.404.9352    [place] Crisis Hotline:    1-970.766.9546 or 649-153-5920    Discharge Survey:   Thank you for choosing [facility]. We hope we did everything we could to make your hospital stay a pleasant one. You may be receiving a phone survey and we would appreciate your time and participation in answering the questions. Your input is very valuable to us in our efforts to improve our service to our patients and their families.        My signature on  this form indicates that:    1. I have reviewed and understand the above information.  2. My questions regarding this information have been answered to my satisfaction.  3. I have formulated a plan with my discharge nurse to obtain my prescribed medications for home.                  Disclaimer         __________________________________                     __________       ________                       Patient Signature                                                 Date                    Time

## 2017-01-28 NOTE — IP AVS SNAPSHOT
2/1/2017          Norm Prabhakar  [address] [address]    Dear Norm:    [facility] wants to ensure your discharge home is safe and you or your loved ones have had all your questions answered regarding your care after you leave the hospital.    You may receive a telephone call within two days of your discharge.  This call is to make certain you understand your discharge instructions as well as ensure we provided you with the best care possible during your stay with us.     The call will only last approximately 3-5 minutes and will be done by a nurse.    Once again, we want to ensure your discharge home is safe and that you have a clear understanding of any next steps in your care.  If you have any questions or concerns, please do not hesitate to contact us, we are here for you.  Thank you for choosing [facility] [place] [facility] for your healthcare needs.    Sincerely,    Alex Vasquez    [facility]

## 2017-01-28 NOTE — ED PROVIDER NOTES
ED Provider Note    CHIEF COMPLAINT  Chief Complaint   Patient presents with   • Unilateral Weakness     rt arm and leg, while sitting playing video games at 1230       HPI  Norm Prabhakar is a 36 y.o. 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 The patient says he was playing a online video game when he suddenly developed weakness to his right arm and right leg. The denies having any seizures, or significant headache. EMS was called and the patient was transported to the [facility] department. On arrival the stroke protocol was initiated. On review of his old chart, the patient was admitted in March 2016 with similar symptoms and diagnosed with a conversion disorder. He was also noted that in September 2015 he presented with right-sided weakness and had a negative workup. The patient denies any headache, chest pain, back pain, or abdominal pain. He has had no nausea, vomiting, or diarrhea.     REVIEW OF SYSTEMS  See HPI for further details. All other systems are negative.     PAST MEDICAL HISTORY  Past Medical History   Diagnosis Date   • ASTHMA    • Glaucoma    • Hypothyroidism    • Depression    • Anxiety      BIPOLAR   • Seizure disorder (CMS-[facility])    • Bipolar 1 disorder (CMS-[facility])    • S/P thyroidectomy    • Murmur      since birth   • Fall      passed out 2 wks ago   • Glaucoma 1982     both eyes/ blind on left eye   • Psychiatric problem 2002     PTSD   • Mental disorder      learning disabilities; speech impairment; developmental delays   • Seizure (CMS-[facility]) 2010   • Pneumonia      remote   • Indigestion      once in a while   • Unspecified disorder of thyroid        FAMILY HISTORY  Family History   Problem Relation Age of Onset   • Hypertension Mother    • Heart Disease Mother   "  • Lung Disease Mother    • Stroke Maternal Grandmother        SOCIAL HISTORY  Social History     Social History   • Marital Status: Single     Spouse Name: N/A   • Number of Children: N/A   • Years of Education: N/A     Social History Main Topics   • Smoking status: Former Smoker -- 0.25 packs/day for 4 years     Types: Cigars, Cigarettes     Quit date: 01/01/2014   • Smokeless tobacco: Never Used   • Alcohol Use: No   • Drug Use: No   • Sexual Activity: Not on file     Other Topics Concern   • Not on file     Social History Narrative       SURGICAL HISTORY  Past Surgical History   Procedure Laterality Date   • Eye surgery     • Thyroid lobectomy     • Other       Hernia Repair when he was 10 yrs old       CURRENT MEDICATIONS  Home Medications     **Home medications have not yet been reviewed for this encounter**          ALLERGIES  Allergies   Allergen Reactions   • Abilify Rash     \"feeling tired, like I don't even know whats going on around me\"   • Aripiprazole      \"I became lethargic.\"   • Fish        PHYSICAL EXAM  VITAL SIGNS: /83 mmHg  Pulse 64  Temp(Src) 37 °C (98.6 °F)  Resp 17  Ht 1.956 m (6' 5\")  Wt 143.79 kg (317 lb)  BMI 37.58 kg/m2  Constitutional: Awake, alert, in no acute distress, Non-toxic appearance.   HENT: Atraumatic. Bilateral external ears normal, mucous membranes moist, throat nonerythematous without exudates, nose is normal.  Eyes: PERRL, EOMI, conjunctiva moist, noninjected.  Neck: Nontender, Normal range of motion, No nuchal rigidity, No stridor.   Lymphatic: No lymphadenopathy noted.   Cardiovascular: Regular rate and rhythm, no murmurs, rubs, gallops.  Thorax & Lungs:  Good breath sounds bilaterally, no wheezes, rales, or retractions.  No chest tenderness.  Abdomen: Bowel sounds normal, Soft, nontender, nondistended, no rebound, guarding, masses.  Back: No CVA or spinal tenderness.  Extremities: Intact distal pulses, No edema, No tenderness.   Skin: Warm, Dry, No rashes. "   Musculoskeletal: No joint swelling or tenderness.  Neurologic: Alert & oriented x 3, cranial nerves II through XII are intact except for blindness in the left eye which is old, tongue protrudes midline, sensory shows subjective numbness to the right upper and right lower extremity, and motor function shows 5/5 strength to the left upper and left lower extremities, no spontaneous movements to the right upper and right lower extremities, though patient is able to sit up without significant difficulty, and does appear to resist on rapid flexion/extension of the upper and lower extremities.   Psychiatric: Affect somewhat flat, Judgment normal, Mood normal.     EKG  Twelve-lead EKG shows a normal sinus rhythm, rate 64, normal intervals, normal axis, QRS widened at 116 ms, nonspecific intraventricular conduction delay, no acute ST wave elevations or ST depressions, no pathologic Q waves, occasional PVC, no evidence of an acute injury or ischemic pattern on my reading, in comparison to previous EKG from August, 2016, there are no acute changes.      RADIOLOGY/PROCEDURES  DX-CHEST-LIMITED (1 VIEW)   Final Result      1.  Mildly enlarged cardiac silhouette, possibly partially accentuated by hypoinflation.   2.  Hypoinflation right lung with right lower lobe atelectasis.      CT-CTA NECK WITH & W/O-POST PROCESSING   Final Result         1. No evidence of flow-limiting stenosis in the cervical carotid or cervical vertebral arteries.      2. Redemonstration of hyperenhancing masses in the anterior neck, and described.      CT-CTA HEAD WITH & W/O-POST PROCESS   Final Result         1. No hemodynamically significant narrowing of the major intracranial vessels.      CT-HEAD W/O   Final Result         1. No acute intracranial findings. No evidence of acute hemorrhage or mass lesion.      2. White matter lucencies are unchanged      3. Please note, hyperacute ischemia can remain occult on CT. If ischemia is clinically suspected,  MRI is suggested for further evaluation.            COURSE & MEDICAL DECISION MAKING  Pertinent Labs & Imaging studies reviewed. (See chart for details)  The patient presents as a possible stroke. Emergent CAT scans and CT angiograms were obtained. CT scan of the head shows no acute intracranial findings. CT angiogram of the head and neck were negative for any acute abnormalities. CBC shows a white count of 8700, normal differential, chemistry profile normal except for glucose of 134, INR 0.90. Dr. Becerril of neurology was present on the patient's arrival. The patient's previous MRI just showed severe leukodystrophy previously. A bedside EEG was performed and did show abnormal activities. Dr. Becerril as recommended increasing the patient's Depakote dose, along with admission to observation. Case was discussed with Dr. Hernandez.      FINAL IMPRESSION  1. Right-sided weakness  2.   3.         Electronically signed by: Rashid Casey, 1/28/2017 3:29 PM

## 2017-01-28 NOTE — ED NOTES
"Med rec complete per pt at bedside and a call  To [facility] on [address] to confirm pt's Depakote dose  Allergies reviewed  No ABX in last month  Pt states that he took his morning medications today but  Does not remember the exact time  Pt also states he is supposed to use Xalatan for his eyes  But has not had it \"in quite a while\"  "

## 2017-01-28 NOTE — IP AVS SNAPSHOT
[employer] Access Code: [email or web address]  Expires: 2/4/2017  8:09 PM    Web [employer]  A secure, online tool to manage your health information     [employer]’s Web Geo [employer]® is a secure, online tool that connects you to your personalized health information from the privacy of your home -- day or night - making it very easy for you to manage your healthcare. Once the activation process is completed, you can even access your medical information using the [employer] jodi, which is available for free in the Apple Jodi store or Google Play store.     [employer] provides the following levels of access (as shown below):   My Chart Features   [facility] Primary Care Doctor [facility]  Specialists [facility]  Urgent  Care Non-[facility]  Primary Care  Doctor   Email your healthcare team securely and privately 24/7 X X X X   Manage appointments: schedule your next appointment; view details of past/upcoming appointments X      Request prescription refills. X      View recent personal medical records, including lab and immunizations X X X X   View health record, including health history, allergies, medications X X X X   Read reports about your outpatient visits, procedures, consult and ER notes X X X X   See your discharge summary, which is a recap of your hospital and/or ER visit that includes your diagnosis, lab results, and care plan. X X       How to register for [employer]:  1. Go to  [email or web address].  2. Click on the Sign Up Now box, which takes you to the New Member Sign Up page. You will need to provide the following information:  a. Enter your [employer] Access Code exactly as it appears at the top of this page. (You will not need to use this code after you’ve completed the sign-up process. If you do not sign up before the expiration date, you must request a new code.)   b. Enter your date of birth.   c. Enter your home email address.   d. Click Submit, and follow the next screen’s instructions.  3. Create a Web [employer] Services ID. This will be your Web [employer] Services  login ID and cannot be changed, so think of one that is secure and easy to remember.  4. Create a [employer] password. You can change your password at any time.  5. Enter your Password Reset Question and Answer. This can be used at a later time if you forget your password.   6. Enter your e-mail address. This allows you to receive e-mail notifications when new information is available in Qwil[employer].  7. Click Sign Up. You can now view your health information.    For assistance activating your [employer] account, call [phone]

## 2017-01-29 PROCEDURE — 700102 HCHG RX REV CODE 250 W/ 637 OVERRIDE(OP): Performed by: PSYCHIATRY & NEUROLOGY

## 2017-01-29 PROCEDURE — A9270 NON-COVERED ITEM OR SERVICE: HCPCS | Performed by: PSYCHIATRY & NEUROLOGY

## 2017-01-29 PROCEDURE — G0378 HOSPITAL OBSERVATION PER HR: HCPCS

## 2017-01-29 PROCEDURE — A9270 NON-COVERED ITEM OR SERVICE: HCPCS | Performed by: INTERNAL MEDICINE

## 2017-01-29 PROCEDURE — 700102 HCHG RX REV CODE 250 W/ 637 OVERRIDE(OP): Performed by: INTERNAL MEDICINE

## 2017-01-29 PROCEDURE — 700111 HCHG RX REV CODE 636 W/ 250 OVERRIDE (IP): Performed by: INTERNAL MEDICINE

## 2017-01-29 PROCEDURE — 700112 HCHG RX REV CODE 229: Performed by: INTERNAL MEDICINE

## 2017-01-29 RX ADMIN — STANDARDIZED SENNA CONCENTRATE AND DOCUSATE SODIUM 1 TABLET: 8.6; 5 TABLET, FILM COATED ORAL at 22:17

## 2017-01-29 RX ADMIN — ENOXAPARIN SODIUM 40 MG: 100 INJECTION SUBCUTANEOUS at 08:41

## 2017-01-29 RX ADMIN — DIVALPROEX SODIUM 1500 MG: 500 TABLET, FILM COATED, EXTENDED RELEASE ORAL at 22:17

## 2017-01-29 RX ADMIN — DOCUSATE SODIUM 100 MG: 100 CAPSULE ORAL at 08:41

## 2017-01-29 RX ADMIN — PRAZOSIN HYDROCHLORIDE 2 MG: 2 CAPSULE ORAL at 22:17

## 2017-01-29 RX ADMIN — SERTRALINE 100 MG: 100 TABLET, FILM COATED ORAL at 08:41

## 2017-01-29 RX ADMIN — LEVOTHYROXINE SODIUM 150 MCG: 150 TABLET ORAL at 05:53

## 2017-01-29 ASSESSMENT — PAIN SCALES - GENERAL
PAINLEVEL_OUTOF10: 0
PAINLEVEL_OUTOF10: 0

## 2017-01-29 NOTE — ED NOTES
Pt awake on cart watching TV. Pt continue to c/o unable to move R arm (unchange since arrival to ED according to pt). No acute deficits noted.  R arm falls to bed with resistance. Pt denies needs at this time. Resp even unlabored, skin pink warm dry.

## 2017-01-29 NOTE — ED NOTES
Assumed care of pt at this time. Pt resting on cart sleeping arouses easily to verbal stimuli. Pt shows no acute neuro deficits at this time. Resp even unlabored, skin pink warm dry.

## 2017-01-29 NOTE — PSYCHIATRY
"PSYCHIATRIC CONSULTATION:  Reason for admission: right sided weakness  Reason for consult:  Conversion disorder  Requesting Physician: Shree Hernandez M.D.  Psychiatric Supervising Attending: Amauri James M.D    Legal Hold Status: N/A  Guardianship: self         Chief Complaint: \"I can't move my right side\"    HPI: The pt is a 37 yo male with a hx of depression, anxiety, seizures, and conversion disorder who was admitted on 01/28/17 with right sided weakness. The pt has been seen by the consult service several times, the first being in 2011 and most recently in 2015 for a similar presentation. He is a poor historian. The pt reports that he was at this group home playing Xbox when he all of a sudden could not feel the right side of his body. He denies there is anything that makes it better or worse; denies any current stressors. He reports this weakness has happened before and that it usually returns if he just rests for a few days. He reports his \"depression\" is under control with the Zoloft he takes and that prazosin helps with the nightmares. He denied any SI or HI.     Psychiatric Review of Current Symptoms:   Denies current depressed mood, trouble sleeping, anhedonia, feelings of worthlessness, decrease energy, trouble concentrating, decreased appetite, psychomotor, SI on his current medications. Endorses being easily distracted, impulsive, grandiose, flight of ideas, increase activity, decrease need for sleep, over-talkativeness. Denies AH,VH, paranoia, or delusions. Reports PTSD but would not elaborate of his symptoms.    MSE:  Vitals: /61 mmHg  Pulse 57  Temp(Src) 36.2 °C (97.2 °F)  Resp 16  Ht 1.956 m (6' 5\")  Wt 142.5 kg (314 lb 2.5 oz)  BMI 37.25 kg/m2  SpO2 94%  Musculoskeletal: some psychomotor retardation  Appearance: overweight  male, appears older than stated age, unkempt grooming and hygiene, wearing his own clothes.  Thought Process: linear, organized, but vague  Abnormal " "Thoughts/Psychosis: no evidence of AH, VH, paranoia, or delusions.  Associations: no loose associations   Speech: slow, regular rhythm and volume, monotone  Language: fluent in English  Mood/Affect: \"alright\" / mood is depressed, not congruent to stated mood, not appropriate to content  SI/HI: no evidence of SI or HI  Attention: intact  Memory: no gross impairment in immediate, recent, or remote memory  Orientation: alert and oriented  Fund of Knowledge: adequate  Insight and Judgment: poor/fair      Past Psych Hx:  Diagnoses: PTSD, depression, bipolar, conversion disorder  Inpatient: [facility] x 2  Outpatient: currently followed by \"Al\" at [facility]  Medications: Zoloft, Prazosin, and Depakote    Note from 1/26/2015 by Dr. Pina:  \"when seen in 8/2012, he did not meet criteria for a major depressive disorder and denied all psych symptoms except depression. When seen in 10/2012 he admitted to almost jumping out a window and cutting on his wrists but others were present and stopped him. He also reported having an explosive temper but no clear violence to others. Sometimes he got anxious but denied PTSD and there have not been any new traumas. Reportedly he has been at [facility] 2 x and dx with bipolar disorder but doesn't meet the criteria from the information he is able to provide. In 8/2012 he did not have problems with Abilify but now reports he does. \"    And also from 1/26/2015:  \"Axis I: Conversion Disorder very likely. He has a lot of stressors going on. However he either makes up stories and/or misinterprets what happens. The issues with his girlfriend (if he was involved with someone) is very confusing. Additionally it is unlikely that he was offered 3 scholarships for college when he has had special ed.    Axis II:Borderline Intellectual Functioning.  Axis III:as noted  Axis IV:intellectual limitations. Most likely poor coping skills.  Axis V:50: baseline for this patient\"    Family Psych Hx:  Per chart mother and " "uncle had bipolar disorder    Social Hx:  Pt currently living in a group home with another person. Pt remains unemployed, but is currently \"looking for something\" Pt finished 9th grade with special ed classes. Denies a relationship currently. Reports legal troubles have resolved in [place]    Drugs/Alcohol/Tobacco Hx:  Alcohol: denies, sober for 3-4 years now  Drugs: denies   Tobacco: denies    Medical Hx:as documented. All the vitals, labs, notes, records, and the MAR. 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   • Psychiatric problem 2002     PTSD   • Mental disorder      learning disabilities; speech impairment; developmental delays   • Seizure (CMS-HCC) 2010   • Pneumonia      remote   • Indigestion      once in a while   • Unspecified disorder of thyroid      Surgical Hx:   Past Surgical History   Procedure Laterality Date   • Eye surgery     • Thyroid lobectomy     • Other       Hernia Repair when he was 10 yrs old     Allergies:  Allergies   Allergen Reactions   • Abilify Unspecified     \"Feeling tired, like I don't even know whats going on around me\"   • Fish        Medications: (current):     Current facility-administered medications:   •  divalproex ER (DEPAKOTE ER) tablet 1,500 mg, 1,500 mg, Oral, QHS, Obi Becerril M.D., 1,500 mg at 01/28/17 2105  •  prazosin (MINIPRESS) capsule 2 mg, 2 mg, Oral, Nightly, Shree Hernandez M.D., 2 mg at 01/28/17 2230  •  sertraline (ZOLOFT) tablet 100 mg, 100 mg, Oral, QAM, Shree Hernandez M.D., 100 mg at 01/29/17 0841  •  docusate sodium (COLACE) capsule 100 mg, 100 mg, Oral, QAM, 100 mg at 01/29/17 0841 **AND** " senna-docusate (PERICOLACE or SENOKOT S) 8.6-50 MG per tablet 1 Tab, 1 Tab, Oral, Nightly, 1 Tab at 01/28/17 2105 **AND** senna-docusate (PERICOLACE or SENOKOT S) 8.6-50 MG per tablet 1 Tab, 1 Tab, Oral, Q24HRS PRN **AND** lactulose 20 GM/30ML solution 30 mL, 30 mL, Oral, Q24HRS PRN **AND** bisacodyl (DULCOLAX) suppository 10 mg, 10 mg, Rectal, Q24HRS PRN **AND** fleet enema 133 mL, 1 Each, Rectal, Once PRN, Shree Hernandez M.D.  •  enoxaparin (LOVENOX) inj 40 mg, 40 mg, Subcutaneous, DAILY, Shree Hernandez M.D., 40 mg at 01/29/17 0841  •  labetalol (NORMODYNE,TRANDATE) injection 10 mg, 10 mg, Intravenous, Q4HRS PRN, Shree Hernandez M.D.  •  ondansetron (ZOFRAN) syringe/vial injection 4 mg, 4 mg, Intravenous, Q4HRS PRN, Shree Hernandez M.D.  •  ondansetron (ZOFRAN ODT) dispertab 4 mg, 4 mg, Oral, Q4HRS PRN, Shree Hernandez M.D.  •  promethazine (PHENERGAN) tablet 12.5-25 mg, 12.5-25 mg, Oral, Q4HRS PRN, Shree Hernandez M.D.  •  promethazine (PHENERGAN) suppository 12.5-25 mg, 12.5-25 mg, Rectal, Q4HRS PRN, Shree Hernandez M.D.  •  prochlorperazine (COMPAZINE) injection 5-10 mg, 5-10 mg, Intravenous, Q4HRS PRN, Shree Hernandez M.D.  •  levothyroxine (SYNTHROID) tablet 150 mcg, 150 mcg, Oral, AM ES, Shree Hernandez M.D., 150 mcg at 01/29/17 0553  Labs:  Results for orders placed or performed during the hospital encounter of 01/28/17   URINALYSIS   Result Value Ref Range    Color Lt. Yellow     Character Clear     Specific Gravity 1.031 <1.035    Ph 7.0 5.0-8.0    Glucose Negative Negative mg/dL    Ketones Negative Negative mg/dL    Protein Negative Negative mg/dL    Bilirubin Negative Negative    Nitrite Negative Negative    Leukocyte Esterase Negative Negative    Occult Blood Negative Negative    Micro Urine Req see below    PROTHROMBIN TIME   Result Value Ref Range    PT 12.4 12.0 - 14.6 sec    INR 0.90 0.87 - 1.13   APTT   Result Value Ref Range    APTT 28.1 24.7 - 36.0 sec   CBC WITH DIFFERENTIAL   Result Value  Ref Range    WBC 8.7 4.8 - 10.8 K/uL    RBC 4.72 4.70 - 6.10 M/uL    Hemoglobin 14.3 14.0 - 18.0 g/dL    Hematocrit 43.6 42.0 - 52.0 %    MCV 92.4 81.4 - 97.8 fL    MCH 30.3 27.0 - 33.0 pg    MCHC 32.8 (L) 33.7 - 35.3 g/dL    RDW 44.8 35.9 - 50.0 fL    Platelet Count 295 164 - 446 K/uL    MPV 10.6 9.0 - 12.9 fL    Neutrophils-Polys 50.50 44.00 - 72.00 %    Lymphocytes 35.20 22.00 - 41.00 %    Monocytes 10.60 0.00 - 13.40 %    Eosinophils 1.80 0.00 - 6.90 %    Basophils 0.90 0.00 - 1.80 %    Immature Granulocytes 1.00 (H) 0.00 - 0.90 %    Nucleated RBC 0.00 /100 WBC    Neutrophils (Absolute) 4.39 1.82 - 7.42 K/uL    Lymphs (Absolute) 3.06 1.00 - 4.80 K/uL    Monos (Absolute) 0.92 (H) 0.00 - 0.85 K/uL    Eos (Absolute) 0.16 0.00 - 0.51 K/uL    Baso (Absolute) 0.08 0.00 - 0.12 K/uL    Immature Granulocytes (abs) 0.09 0.00 - 0.11 K/uL    NRBC (Absolute) 0.00 K/uL   COMP METABOLIC PANEL   Result Value Ref Range    Sodium 137 135 - 145 mmol/L    Potassium 4.1 3.6 - 5.5 mmol/L    Chloride 104 96 - 112 mmol/L    Co2 27 20 - 33 mmol/L    Anion Gap 6.0 0.0 - 11.9    Glucose 134 (H) 65 - 99 mg/dL    Bun 20 8 - 22 mg/dL    Creatinine 0.88 0.50 - 1.40 mg/dL    Calcium 9.4 8.5 - 10.5 mg/dL    AST(SGOT) 18 12 - 45 U/L    ALT(SGPT) 18 2 - 50 U/L    Alkaline Phosphatase 54 30 - 99 U/L    Total Bilirubin 0.4 0.1 - 1.5 mg/dL    Albumin 4.9 3.2 - 4.9 g/dL    Total Protein 7.3 6.0 - 8.2 g/dL    Globulin 2.4 1.9 - 3.5 g/dL    A-G Ratio 2.0 g/dL   TROPONIN   Result Value Ref Range    Troponin I <0.01 0.00 - 0.04 ng/mL   ESTIMATED GFR   Result Value Ref Range    GFR If African American >60 >60 mL/min/1.73 m 2    GFR If Non African American >60 >60 mL/min/1.73 m 2   VALPROIC ACID   Result Value Ref Range    Valproic Acid 42.3 (L) 50.0 - 100.0 ug/mL   TSH (Thyroid Stimulating Hormone)   Result Value Ref Range    TSH 12.440 (H) 0.300 - 3.700 uIU/mL   EKG (ER)   Result Value Ref Range    Report       [facility] Emergency  Dept.    Test Date:  2017  Pt Name:    HOLLY KIM                 Department: ER  MRN:        [id number]                      Room:       RD 12  Gender:     M                            Technician: [id number]  :        1980                   Requested By:ER TRIAGE PROTOCOL  Order #:    [id number]                    Reading MD:    Measurements  Intervals                                Axis  Rate:       64                           P:          34  ID:         [id number]                          QRS:        8  QRSD:       116                          T:          35  QT:         392  QTc:        405    Interpretive Statements  SINUS RHYTHM  NONSPECIFIC INTRAVENTRICULAR CONDUCTION DELAY  Compared to ECG 2016 18:58:29  No significant changes       Imaging:  DX-CHEST-LIMITED (1 VIEW)   Final Result      1.  Mildly enlarged cardiac silhouette, possibly partially accentuated by hypoinflation.   2.  Hypoinflation right lung with right lower lobe atelectasis.      CT-CTA NECK WITH & W/O-POST PROCESSING   Final Result         1. No evidence of flow-limiting stenosis in the cervical carotid or cervical vertebral arteries.      2. Redemonstration of hyperenhancing masses in the anterior neck, and described.      CT-CTA HEAD WITH & W/O-POST PROCESS   Final Result         1. No hemodynamically significant narrowing of the major intracranial vessels.      CT-HEAD W/O   Final Result         1. No acute intracranial findings. No evidence of acute hemorrhage or mass lesion.      2. White matter lucencies are unchanged      3. Please note, hyperacute ischemia can remain occult on CT. If ischemia is clinically suspected, MRI is suggested for further evaluation.        EEG/Tests: 17  INTERPRETATION:  This is an abnormal video EEG recording in the awake and drowsy state(s). Continuous right posterior quadrant spikes/sharps with brief periodic and rhythmic evolution, but without clear evidence due to short duration, to  be considered a seizure. Frequent left posterior quadrant spike/sharps discharges, which may be independent or synchronous with the right posterior quadrant discharges. There is frequent right posterior temporal slowing. No clinical events reported by patient other than persistent right sided weakness and tingling. The findings suggest underlying area (s) of cortical irritability and structural abnormality. I suspect a posterior epileptogenic focus or foci, as multifocal epilepsy is possible in this case. Clinical and imaging correlation is recommended.    EKG: QTc 405    Medical Review of Symptoms: (as reported by the patient). All systems reviewed. Those not listed below were found to be negative.     Neurological: right sided upper and lower extremity weakness, presented as paralysis on exam.   Musculoskeletal: 0/5 strength on upper and lower extremities; 3-4/5 on left upper and lower extremities    Assessment:  Psychiatric: (include TBIs, sz, strokes)  1. Depressive Disorder, unspecified  2. 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 Seizure Disorder  4. Reported hx of PTSD    The pt is a 35 yo male with a hx of conversion disorder and several mood complaints over the years. He presents depressed with right sided weakness/paralysis which is likely conversion disorder again. Neurology has seen the pt and ruled out concerns for stroke; an EEG was completed to verify the pt's seizures. He is currently on medication for depression and PTSD symptoms. He is currently being seen for outpatient therapy and should continue once discharged.      Medical: as noted by the medical treatment team.    Plan:  -No additional recommendations at this time; agree with recommendations for PT/OT prior to discharge  -Legal hold: N/A  -Records reviewed: yes       -Medications: Zoloft 100 mg daily, Prazosin 2 mg po qhs, Depakote ER 1500 mg po qhs  -Orders: no new  orders  -Collateral: obtained with permission  -Will Follow as needed while inpatient  -Thank you for the Consult

## 2017-01-29 NOTE — PROCEDURES
DATE OF SERVICE:  01/28/2017    VIDEO ELECTROENCEPHALOGRAM REPORT      Referring MD: Dr. Mike.     DOS:  1/28/2017    INDICATION:  Norm Prabhakar 36 y.o. male presenting with possible seizure.     CURRENT ANTIEPILEPTIC REGIMEN: Valproic acid ER 1000 mg po qhs.     TECHNIQUE: 30 channel routine electroencephalogram (EEG) was performed in accordance with the international 10-20 system. The study was reviewed in bipolar and referential montages. The recording examined the patient during wakeful and drowsy state(s).     DESCRIPTION OF THE RECORD:  During the wakefulness, the background showed a symmetrical 8 Hz alpha activity posteriorly with amplitude of 70 mV.  There was reactivity to eye closure/opening.  A normal anterior-posterior gradient was noted with faster beta frequencies seen anteriorly.  During drowsiness, theta frequencies were seen.    ACTIVATION PROCEDURES:    Hyperventilation was performed by the patient for a total of 3 minutes. The technician performing the test noted good effort. No significant background changes were noted on EEG.      Intermittent Photic stimulation was performed in a stepwise fashion from 1 to 30 Hz and elicited a normal response (photic driving), most noticeable in the posterior leads but only with lower frequencies.      ICTAL AND/OR INTERICTAL FINDINGS:    Frequent spikes/sharps, with brief rhythmic evolution in the right posterior quadrant, then a synchronous higher amplitude spike and slow wave which appeared equally dominant in both posterior quadrants.       Asynchronous right and left (most prominent on this picture) posterior spikes/sharps.       Right posterior temporal spike/sharps, phase reversal and maximum amplitude at T6.      Right temporal slowing with frequent right posterior sharps.        Right posterior spikes/sharps exhibiting briefly a periodic pattern.        Burst of generalized, posteriorly predominant spikes/sharps with possible onset on the right  posterior quadrant.            EKG: sampling of the EKG recording demonstrated sinus rhythm.        INTERPRETATION:  This is an abnormal video EEG recording in the awake and drowsy state(s). Continuous right posterior quadrant spikes/sharps with brief periodic and rhythmic evolution, but without clear evidence due to short duration, to be considered a seizure. Frequent left posterior quadrant spike/sharps discharges, which may be independent or synchronous with the right posterior quadrant discharges. There is frequent right posterior temporal slowing. No clinical events reported by patient other than persistent right sided weakness and tingling. The findings suggest underlying area (s) of cortical irritability and structural abnormality. I suspect a posterior epileptogenic focus or foci, as multifocal epilepsy is possible in this case. Clinical and imaging correlation is recommended.      Obi Becerril MD  Diplomate in Neurology, Epilepsy, and Electrodiagnostic Medicine.    DORIS AVITIA    DD:  01/28/2017 17:38:19  DT:  01/28/2017 19:42:00    D#:  [id number]  Job#:  [id number]

## 2017-01-29 NOTE — ED NOTES
Pt to floor at this time showing no signs of distress/discomfort. Pt resp even unlabored, skin pink warm dry. Pt continue to exhibit R arm weakness, no acute deficits noted. Denies further needs.

## 2017-01-29 NOTE — PROGRESS NOTES
Pt A&Ox4, declines pain at this time.  Whole R side of body is numb with some tingling in face.   0/5 strength in R arm and R leg, 5/5 strength L arm and L leg.   POC discussed, no further needs at this time, call light within reach, bed alarm armed.

## 2017-01-29 NOTE — EEG PROGRESS NOTE
VIDEO ELECTROENCEPHALOGRAM REPORT      Referring MD: Dr. Mike.     DOS:  1/28/2017    INDICATION:  Norm Prabhakar 36 y.o. male presenting with possible seizure.     CURRENT ANTIEPILEPTIC REGIMEN: Valproic acid ER 1000 mg po qhs.     TECHNIQUE: 30 channel routine electroencephalogram (EEG) was performed in accordance with the international 10-20 system. The study was reviewed in bipolar and referential montages. The recording examined the patient during wakeful and drowsy state(s).     DESCRIPTION OF THE RECORD:  During the wakefulness, the background showed a symmetrical 8 Hz alpha activity posteriorly with amplitude of 70 mV.  There was reactivity to eye closure/opening.  A normal anterior-posterior gradient was noted with faster beta frequencies seen anteriorly.  During drowsiness, theta frequencies were seen.    ACTIVATION PROCEDURES:   Hyperventilation was performed by the patient for a total of 3 minutes. The technician performing the test noted good effort. No significant background changes were noted on EEG.     Intermittent Photic stimulation was performed in a stepwise fashion from 1 to 30 Hz and elicited a normal response (photic driving), most noticeable in the posterior leads but only with lower frequencies.      ICTAL AND/OR INTERICTAL FINDINGS:   Frequent spikes/sharps, with brief rhythmic evolution in the right posterior quadrant, then a synchronous higher amplitude spike and slow wave which appeared equally dominant in both posterior quadrants.       Asynchronous right and left (most prominent on this picture) posterior spikes/sharps.       Right posterior temporal spike/sharps, phase reversal and maximum amplitude at T6.      Right temporal slowing with frequent right posterior sharps.       Right posterior spikes/sharps exhibiting briefly a periodic pattern.       Burst of generalized, posteriorly predominant spikes/sharps with possible onset on the right posterior quadrant.           EKG:  sampling of the EKG recording demonstrated sinus rhythm.       INTERPRETATION:  This is an abnormal video EEG recording in the awake and drowsy state(s). Continuous right posterior quadrant spikes/sharps with brief periodic and rhythmic evolution, but without clear evidence due to short duration, to be considered a seizure. Frequent left posterior quadrant spike/sharps discharges, which may be independent or synchronous with the right posterior quadrant discharges. There is frequent right posterior temporal slowing. No clinical events reported by patient other than persistent right sided weakness and tingling. The findings suggest underlying area (s) of cortical irritability and structural abnormality. I suspect a posterior epileptogenic focus or foci, as multifocal epilepsy is possible in this case. Clinical and imaging correlation is recommended.      Obi Becerril MD  Diplomate in Neurology, Epilepsy, and Electrodiagnostic Medicine.

## 2017-01-29 NOTE — RESPIRATORY CARE
COPD EDUCATION by COPD CLINICAL EDUCATOR  1/29/2017 at 9:02 AM by Jacinta Ribeiro     Patient reviewed by COPD education team. Patient does not qualify for COPD program.

## 2017-01-29 NOTE — PROGRESS NOTES
Hospital Medicine Progress Note, Adult, Complex               Author: Irineo SAMUEL Lane Date & Time created: 1/29/2017  8:29 AM   CC: Weakness  Interval History:  35 yo man with history of conversion disorder came in with R side weakness. This is not new to him. In March, 2016, he had the same complaints and was admitted to the hospital Pt was seen by Dr. Becerril from neurology yesterday. Of note , previous work up was negative. He did not recommend additional work up at this time. Follow up in his clinic was recommended as well.     His VS  Are stable. He continued to complain of R sided weakness. Psych was consulted as well. Their input is not available at this time.     Review of Systems:  ROS   Pertinent positives/negative as mentioned above.     A complete review of systems was done. All other systems were negative.     Physical Exam:  Physical Exam   Constitutional: Well-developed, well-nourished, (-) diaphoresis, (-) distress  HENT: Normocephalic, atraumatic, (-) tonsillopharyngal congestion, (-) tonsillopharyngeal exudate  Eyes: PERRLA, pink conjuctivae, (-) icteric sclerae  Neck: (-) cervical lymphadenopathy, (-) rigidity  Cardiovascular: RRR, (-) murmurs, (-) rubs, (-) gallops, (-) edema  Pulmonary: Equal chest expansion, (+) clear breath sounds, (-) wheezes/rhonchi, (-) crackles/rales  Abdominal: (+) bowel sounds, soft, (-) tenderness, (-) masses, (-) guarding/rebound  Musculoskeletal: (-) joint swelling, (-) joint tenderness, (-) joint deformities, (-) muscle tenderness,  Neurologic: R sided hemiplegia, mildly dysarthric  Skin: (-) erythema, (-) warmth, (-) rashes, (-) ulcers, (-) open wounds  Psychiatric: mood/affect WNL, thought content WNL, behavior age appropriate    Labs:        Invalid input(s): [id number]  Recent Labs      01/28/17   1420   TROPONINI  <0.01     Recent Labs      01/28/17   1420   SODIUM  137   POTASSIUM  4.1   CHLORIDE  104   CO2  27   BUN  20   CREATININE  0.88   CALCIUM  9.4      Recent Labs      17   1420   ALTSGPT  18   ASTSGOT  18   ALKPHOSPHAT  54   TBILIRUBIN  0.4   GLUCOSE  134*     Recent Labs      17   1420   RBC  4.72   HEMOGLOBIN  14.3   HEMATOCRIT  43.6   PLATELETCT  295   PROTHROMBTM  12.4   APTT  28.1   INR  0.90     Recent Labs      17   1420   WBC  8.7   NEUTSPOLYS  50.50   LYMPHOCYTES  35.20   MONOCYTES  10.60   EOSINOPHILS  1.80   BASOPHILS  0.90   ASTSGOT  18   ALTSGPT  18   ALKPHOSPHAT  54   TBILIRUBIN  0.4           Hemodynamics:  Temp (24hrs), Av.6 °C (97.8 °F), Min:36.2 °C (97.2 °F), Max:37 °C (98.6 °F)  Temperature: 36.2 °C (97.2 °F)  Pulse  Av.6  Min: 53  Max: 74Heart Rate (Monitored): (!) 59  Blood Pressure: 108/61 mmHg, NIBP: 128/84 mmHg     Respiratory:    Respiration: 16, Pulse Oximetry: 94 %           Fluids:    Intake/Output Summary (Last 24 hours) at 17 0829  Last data filed at 17 0600   Gross per 24 hour   Intake    400 ml   Output      0 ml   Net    400 ml     Weight: (!) 142.5 kg (314 lb 2.5 oz)  GI/Nutrition:  Orders Placed This Encounter   Procedures   • Diet Order     Standing Status: Standing      Number of Occurrences: 1      Standing Expiration Date:      Order Specific Question:  Diet:     Answer:  Regular [1]     Medical Decision Making, by Problem:  Active Hospital Problems    Diagnosis   • Right sided weakness [M62.81]  Request PT and OT     • Conversion disorder [F44.9]  Depression [F32.9]  Awaiting psych input     • Hypothyroid [E03.9]  Synthroid 150 mcg daily     • Possible seizure disorder  Depakote ER 1500 at HS         Core Measures  Full code  Enoxaparin

## 2017-01-29 NOTE — H&P
REASON FOR ADMISSION:  Right-sided weakness.    HISTORY OF PRESENT ILLNESS:  The patient is a 36-year-old gentleman with a   significant prior history of conversion disorder.  He also has bipolar   disorder.  He reports history of seizures without witnessed events and   depression.  He also has a known history of goiter, which was at least   partially removed.  He was most recently admitted for right-sided weakness,   which has been a recurrent problem with him in March 2016.  He presents again   today complaining of right-sided weakness that began earlier today while he   was playing video games on his [employer]    On exam, he is completely flaccid.  However, he can be tricked into movement.    Dr. Obi Becerril, neurologist, was consulted and does not feel this   represents a stroke symptom nor he does require any further workup.  However,   given his persistent complaints of right-sided weakness, he is unable to go   home.  He will be admitted to neurology floor.  I will ____ psychiatry to   assist with management.    He claims there is no improvement in the right side.  He denies any headache.    He states he is hungry.  He denies nausea, vomiting, diarrhea, constipation,   chest pain, shortness of breath, fever, chills, black or bloody stools, skin   rash, vision changes, hearing changes, or easy bruising.    PAST MEDICAL HISTORY:  1.  Conversion disorder with recurrent right-sided weakness.  2.  Depression.  3.  Bipolar disorder.  4.  Legal blindness in the left eye.  5.  Hypothyroidism.  6.  Asthma.    PAST SURGICAL HISTORY:  Thyroid goiter removal.    SOCIAL HISTORY:  Lives in a group home.  He is a nonalcoholic.  He does smoke   cigars.    FAMILY HISTORY:  Reviewed, but noncontributory.    CURRENT MEDICATIONS:  1.  Depakote 1000 mg every night, extended release.  2.  Synthroid 125 mcg daily.  3.  Prazosin 2 mg in the morning.  4.  Zoloft 100 mg daily.    ALLERGIES:  ABILIFY, MADE HIM FEEL TIRED AND  FISH.    REVIEW OF SYSTEMS:  As in the HPI.    PHYSICAL EXAMINATION:  VITAL SIGNS:  He is afebrile, pulse in the 60s, respiratory rate in the teens,   and blood pressure 130/83.  GENERAL:  He is obese.  He is soft spoken.  He has a slightly odd affect.  HEENT:  Pupils are equal, round and reactive to light.  Extraocular movements   are intact.  Mucous members are moist.  NECK:  Supple without jugular venous distention, lymphadenopathy or bruit.  CARDIOVASCULAR:  He is borderline bradycardic, but regular.  LUNGS:  Clear to auscultation bilaterally.  ABDOMEN:  Obese, soft, nontender, and nondistended.  Bowel sounds are present.    There is no palpable organomegaly.  BACK:  No CVA tenderness.  EXTREMITIES:  Warm.  There is no significant edema.  NEUROLOGIC:  He will not move the right or left leg.  However, he does   withdraw from pain.    LABORATORY DATA:  White count 9, hemoglobin 14, hematocrit 44, mean cell   volume is 92, platelets ____, neutrophils 51%, lymphocytes 35%, and monocytes   11%.  Sodium 137, potassium 4.1, chloride 104, bicarbonate 27, glucose 134,   BUN 20, creatinine 0.9, and calcium 9.4.  AST 18, ALT is 18, and alkaline   phosphatase is 54.  Total bilirubin 0.4, albumin 4.9, and total protein 7.3.    Depakote is 42.  Troponin is negative.  INR is 0.9.  Urinalysis is negative.    TSH is 12.4.    STUDIES:  CT angiogram of the head and neck are negative.  Head CT notes   chronic changes.  Chest x-ray is negative.    IMPRESSION:  1.  Right-sided weakness consistent with prior presentations and known history   of conversion disorder.  2.  Reported history of seizures, no convulsions seen.  3.  Bipolar disorder.  4.  Depression.  5.  Morbid obesity.  6.  Hypothyroidism.    PLAN:  1.  For the conversion disorder.  Dr. Obi Becerril was seen the patient.  This   is no indication to do any further workup from a stroke standpoint.  I will   have psychiatry assist with management.  2.  Depression and bipolar  disorder.  Continue Depakote and Zoloft.  3.  Hypothyroidism.  Continue replacement.  I will increase the dose.  4.  Prophylaxis, Lovenox and laxatives.       ____________________________________     MD CHARITO JACOBSON / ADORE    DD:  01/28/2017 20:17:45  DT:  01/28/2017 22:00:23    D#:  [id number]  Job#:  [id number]    cc: Obi Becerril MD

## 2017-01-30 PROCEDURE — A9270 NON-COVERED ITEM OR SERVICE: HCPCS | Performed by: INTERNAL MEDICINE

## 2017-01-30 PROCEDURE — G0378 HOSPITAL OBSERVATION PER HR: HCPCS

## 2017-01-30 PROCEDURE — 97162 PT EVAL MOD COMPLEX 30 MIN: CPT

## 2017-01-30 PROCEDURE — 700112 HCHG RX REV CODE 229: Performed by: INTERNAL MEDICINE

## 2017-01-30 PROCEDURE — G8979 MOBILITY GOAL STATUS: HCPCS | Mod: CI

## 2017-01-30 PROCEDURE — G8988 SELF CARE GOAL STATUS: HCPCS | Mod: CI

## 2017-01-30 PROCEDURE — 99224 PR SUBSEQUENT OBSERVATION CARE,LEVEL I: CPT | Performed by: INTERNAL MEDICINE

## 2017-01-30 PROCEDURE — G8978 MOBILITY CURRENT STATUS: HCPCS | Mod: CJ

## 2017-01-30 PROCEDURE — G8987 SELF CARE CURRENT STATUS: HCPCS | Mod: CI

## 2017-01-30 PROCEDURE — 700102 HCHG RX REV CODE 250 W/ 637 OVERRIDE(OP): Performed by: INTERNAL MEDICINE

## 2017-01-30 PROCEDURE — 700102 HCHG RX REV CODE 250 W/ 637 OVERRIDE(OP): Performed by: PSYCHIATRY & NEUROLOGY

## 2017-01-30 PROCEDURE — 97165 OT EVAL LOW COMPLEX 30 MIN: CPT

## 2017-01-30 PROCEDURE — A9270 NON-COVERED ITEM OR SERVICE: HCPCS | Performed by: PSYCHIATRY & NEUROLOGY

## 2017-01-30 PROCEDURE — 700111 HCHG RX REV CODE 636 W/ 250 OVERRIDE (IP): Performed by: INTERNAL MEDICINE

## 2017-01-30 RX ADMIN — SERTRALINE 100 MG: 100 TABLET, FILM COATED ORAL at 07:38

## 2017-01-30 RX ADMIN — PRAZOSIN HYDROCHLORIDE 2 MG: 2 CAPSULE ORAL at 19:50

## 2017-01-30 RX ADMIN — DOCUSATE SODIUM 100 MG: 100 CAPSULE ORAL at 07:39

## 2017-01-30 RX ADMIN — LEVOTHYROXINE SODIUM 150 MCG: 150 TABLET ORAL at 06:14

## 2017-01-30 RX ADMIN — DIVALPROEX SODIUM 1500 MG: 500 TABLET, FILM COATED, EXTENDED RELEASE ORAL at 19:50

## 2017-01-30 RX ADMIN — STANDARDIZED SENNA CONCENTRATE AND DOCUSATE SODIUM 1 TABLET: 8.6; 5 TABLET, FILM COATED ORAL at 19:50

## 2017-01-30 RX ADMIN — ENOXAPARIN SODIUM 40 MG: 100 INJECTION SUBCUTANEOUS at 07:39

## 2017-01-30 ASSESSMENT — GAIT ASSESSMENTS
DISTANCE (FEET): 50
GAIT LEVEL OF ASSIST: CONTACT GUARD ASSIST

## 2017-01-30 ASSESSMENT — PAIN SCALES - GENERAL: PAINLEVEL_OUTOF10: 0

## 2017-01-30 ASSESSMENT — ACTIVITIES OF DAILY LIVING (ADL): TOILETING: INDEPENDENT

## 2017-01-30 NOTE — THERAPY
"Physical Therapy Evaluation completed.   Bed Mobility:  Supine to Sit: Supervised  Transfers: Sit to Stand: Supervised  Gait: Level Of Assist: Contact Guard Assist with No Equipment Needed       Plan of Care: Will benefit from Physical Therapy 2 times per week to check for improved function.  Discharge Recommendations: Equipment: No Equipment Needed. Post-acute therapy recommended after discharged home.  Inconsistent function vs testing. Pt showing ability to lower leg to mattress after knee to chest in supine, then states unable to move leg across mattress. Pt then able to go down stairs leading with L LE which forces work to R LE--this forces R LE to take pt's body weight when descending and pt showed no instability with this.     See \"Rehab Therapy-Acute\" Patient Summary Report for complete documentation.     "

## 2017-01-30 NOTE — PROGRESS NOTES
Hospital Medicine Progress Note, Adult, Complex               Author: Irineo Duvaliftikhardelicia Date & Time created: 1/30/2017  7:11 AM   CC: Weakness  Interval History:  Psych notes are appreciated. They did ot add anything to the management but recommended to continue the current treatment plan. He has regained some muscle movements in the R upper and lower ext. His VS are srable. There is no new focal deficit. No other complaints.     Review of Systems:  ROS   Pertinent positives/negative as mentioned above.     A complete review of systems was done. All other systems were negative.     Physical Exam:  Physical Exam   Constitutional: Well-developed, well-nourished, (-) diaphoresis, (-) distress  HENT: Normocephalic, atraumatic, (-) tonsillopharyngal congestion, (-) tonsillopharyngeal exudate  Eyes: PERRLA, pink conjuctivae, (-) icteric sclerae  Neck: (-) cervical lymphadenopathy, (-) rigidity  Cardiovascular: RRR, (-) murmurs, (-) rubs, (-) gallops, (-) edema  Pulmonary: Equal chest expansion, (+) clear breath sounds, (-) wheezes/rhonchi, (-) crackles/rales  Abdominal: (+) bowel sounds, soft, (-) tenderness, (-) masses, (-) guarding/rebound  Musculoskeletal: (-) joint swelling, (-) joint tenderness, (-) joint deformities, (-) muscle tenderness,  Neurologic: R sided hemiplegia, mildly dysarthric  Skin: (-) erythema, (-) warmth, (-) rashes, (-) ulcers, (-) open wounds  Psychiatric: mood/affect WNL, thought content WNL, behavior age appropriate    Labs:        Invalid input(s): [id number]  Recent Labs      01/28/17   1420   TROPONINI  <0.01     Recent Labs      01/28/17   1420   SODIUM  137   POTASSIUM  4.1   CHLORIDE  104   CO2  27   BUN  20   CREATININE  0.88   CALCIUM  9.4     Recent Labs      01/28/17   1420   ALTSGPT  18   ASTSGOT  18   ALKPHOSPHAT  54   TBILIRUBIN  0.4   GLUCOSE  134*     Recent Labs      01/28/17   1420   RBC  4.72   HEMOGLOBIN  14.3   HEMATOCRIT  43.6   PLATELETCT  295   PROTHROMBTM  12.4   APTT   28.1   INR  0.90     Recent Labs      17   1420   WBC  8.7   NEUTSPOLYS  50.50   LYMPHOCYTES  35.20   MONOCYTES  10.60   EOSINOPHILS  1.80   BASOPHILS  0.90   ASTSGOT  18   ALTSGPT  18   ALKPHOSPHAT  54   TBILIRUBIN  0.4           Hemodynamics:  Temp (24hrs), Av.2 °C (97.2 °F), Min:35.8 °C (96.4 °F), Max:36.4 °C (97.6 °F)  Temperature: 36.4 °C (97.6 °F)  Pulse  Av.6  Min: 53  Max: 74  Blood Pressure: 110/55 mmHg     Respiratory:    Respiration: 18, Pulse Oximetry: 93 %           Fluids:    Intake/Output Summary (Last 24 hours) at 17 0711  Last data filed at 17 0600   Gross per 24 hour   Intake   1100 ml   Output   1500 ml   Net   -400 ml       GI/Nutrition:  Orders Placed This Encounter   Procedures   • Diet Order     Standing Status: Standing      Number of Occurrences: 1      Standing Expiration Date:      Order Specific Question:  Diet:     Answer:  Regular [1]     Medical Decision Making, by Problem:  Active Hospital Problems    Diagnosis   • Right sided weakness [M62.81]  PT and OT  Unable to DC because of persistent ( but improving ) L sisded weakness   • Conversion disorder [F44.9]  Depression [F32.9]  PTSD  psych input appreciated     • Hypothyroid [E03.9]  Synthroid 150 mcg daily     • Possible seizure disorder  Depakote ER 1500 at HS         Core Measures  Full code  Enoxaparin

## 2017-01-30 NOTE — PSYCHIATRY
"PSYCHIATRIC FOLLOW UP:    Reason for Admission: right sided weakness  Psychiatric Supervising Attending:  Vicki Head M.D.    Subjective: There were no acute events overnight. The pt reports that he is starting to get some movement and strength back. He reports he did some PT and it was helpful. He denied any new complaints or concerns. The team discussed following up with Neurology for his seizures and what he describes as migraines. He denied any additional questions or concerns.    Psychiatric Examination (MSE) :    Vitals: /71 mmHg  Pulse 63  Temp(Src) 36.9 °C (98.4 °F)  Resp 17  Ht 1.956 m (6' 5\")  Wt 142.5 kg (314 lb 2.5 oz)  BMI 37.25 kg/m2  SpO2 96%  Musculoskeletal: some psychomotor retardation  Appearance: overweight  male, appears older than stated age, unkempt grooming and hygiene, wearing his own clothes.  Thought Process: linear, organized, but vague  Abnormal Thoughts/Psychosis: no evidence of AH, VH, paranoia, or delusions.  Associations: no loose associations   Speech: slow, regular rhythm and volume, monotone  Language: fluent in English  Mood/Affect: \"better\" / mood is depressed, not congruent to stated mood, not appropriate to content  SI/HI: no evidence of SI or HI  Attention: intact  Memory: no gross impairment in immediate, recent, or remote memory  Orientation: alert and oriented  Fund of Knowledge: adequate  Insight and Judgment: poor/fair    Assessment:  1. Depressive Disorder, unspecified  2. 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 Seizure Disorder  4. Reported hx of PTSD    The pt is a 37 yo male with a hx of conversion disorder and several mood complaints over the years. He presents depressed with right sided weakness/paralysis which is likely conversion disorder again but may also be associated with migraines per the pt's report. Neurology has seen the pt and ruled out " concerns for stroke; an EEG was completed to verify the pt's seizures. He is currently on medication for depression and PTSD symptoms. He is currently being seen for outpatient therapy and should continue once discharged.     Plan:  -No additional recommendations at this time; agree with recommendations for PT/OT prior to discharge  -Pt should follow up with Neurology as indicated; discussed mentioning that he has migraines at this follow up appointment.  -Legal hold: N/A  -Records reviewed: yes        -Medications: Zoloft 100 mg daily, Prazosin 2 mg po qhs, Depakote ER 1500 mg po qhs  -Orders: no new orders  -Collateral: obtained with permission  -Signing off  -Thank you for the Consult

## 2017-01-30 NOTE — THERAPY
"Occupational Therapy Evaluation completed.   Functional Status: Supv supine > EOB, SBA transfers without AD, min A LB dressing, supv standing oral care and toileting   Plan of Care: Will benefit from Occupational Therapy 2 times per week  Discharge Recommendations:  Equipment: Will Continue to Assess for Equipment Needs.    See \"Rehab Therapy-Acute\" Patient Summary Report for complete documentation.    36 y.o. male with h/o bipolar d/o who presented with c/o R-sided weakness. Pt has several recent admits with similar presentation and has been dx with conversion disorder. CT negative. MMT not formally completed due to inconsistencies of presentation. Pt appears to have R-sided weakness, tends to use RUE as stabilizer, but was able to tie shoes without difficulty. Pt is below baseline for basic ADL and mobity. OT to follow while on acute care to maximize functional indpendence.     "

## 2017-01-30 NOTE — PROGRESS NOTES
Pt A&Ox4, declines pain at this time. States his IV hurts, IV d/c and new 18g started in R forearm.   RUE and RLE 1/5 strength, can now move fingertips and sensation is returning. 5/5 strength LUE and LLE.   POC discussed, no further needs at this time, call light within reach, bed alarm armed.

## 2017-01-30 NOTE — CARE PLAN
Problem: Safety  Goal: Will remain free from falls  Outcome: PROGRESSING AS EXPECTED  Bed alarm armed, pt A&Ox4 and uses call light appropriately.     Problem: Skin Integrity  Goal: Risk for impaired skin integrity will decrease  Outcome: PROGRESSING AS EXPECTED  Pt turning self despite R sided deficits.

## 2017-01-30 NOTE — PROGRESS NOTES
Pt aaox4. R sided weakness 2/5. Numbness to R side. +BS. Voiding w/o difficulty. Reviewed poc with pt-verbalized understanding. Therapy to work with pt today. Bed alarm in use. Call light in reach.

## 2017-01-30 NOTE — PROGRESS NOTES
"Pt able to ambulate with therapy this AM. Pt sitting up in chair after working with therapy, pt SBA from chair to bed, able to bear weight on RLE while standing. Discussed with pt able to d/c home once he is mobilizing well. Pt states \"I have been pushing myself.\"  "

## 2017-01-30 NOTE — CARE PLAN
Problem: Safety  Goal: Will remain free from falls  Intervention: Implement fall precautions  Bed alarm in use. Call light w/in reach. Instructed pt to call for assistance      Problem: Knowledge Deficit  Goal: Knowledge of the prescribed therapeutic regimen will improve  Intervention: Discuss information regarding therpeutic regimen and document in education  poc discussed. Therapy to see pt today. Pt verbalized understanding.

## 2017-01-31 PROCEDURE — 700102 HCHG RX REV CODE 250 W/ 637 OVERRIDE(OP): Performed by: PSYCHIATRY & NEUROLOGY

## 2017-01-31 PROCEDURE — G0378 HOSPITAL OBSERVATION PER HR: HCPCS

## 2017-01-31 PROCEDURE — A9270 NON-COVERED ITEM OR SERVICE: HCPCS | Performed by: PSYCHIATRY & NEUROLOGY

## 2017-01-31 PROCEDURE — 99226 PR SUBSEQUENT OBSERVATION CARE,LEVEL III: CPT | Performed by: INTERNAL MEDICINE

## 2017-01-31 PROCEDURE — 700102 HCHG RX REV CODE 250 W/ 637 OVERRIDE(OP): Performed by: INTERNAL MEDICINE

## 2017-01-31 PROCEDURE — A9270 NON-COVERED ITEM OR SERVICE: HCPCS | Performed by: INTERNAL MEDICINE

## 2017-01-31 PROCEDURE — 700112 HCHG RX REV CODE 229: Performed by: INTERNAL MEDICINE

## 2017-01-31 PROCEDURE — 700111 HCHG RX REV CODE 636 W/ 250 OVERRIDE (IP): Performed by: INTERNAL MEDICINE

## 2017-01-31 RX ADMIN — DOCUSATE SODIUM 100 MG: 100 CAPSULE ORAL at 10:08

## 2017-01-31 RX ADMIN — DIVALPROEX SODIUM 1500 MG: 500 TABLET, FILM COATED, EXTENDED RELEASE ORAL at 20:16

## 2017-01-31 RX ADMIN — STANDARDIZED SENNA CONCENTRATE AND DOCUSATE SODIUM 1 TABLET: 8.6; 5 TABLET, FILM COATED ORAL at 20:16

## 2017-01-31 RX ADMIN — SERTRALINE 100 MG: 100 TABLET, FILM COATED ORAL at 10:08

## 2017-01-31 RX ADMIN — ENOXAPARIN SODIUM 40 MG: 100 INJECTION SUBCUTANEOUS at 10:08

## 2017-01-31 RX ADMIN — PRAZOSIN HYDROCHLORIDE 2 MG: 2 CAPSULE ORAL at 20:18

## 2017-01-31 RX ADMIN — LEVOTHYROXINE SODIUM 150 MCG: 150 TABLET ORAL at 05:24

## 2017-01-31 ASSESSMENT — ENCOUNTER SYMPTOMS
FOCAL WEAKNESS: 1
BLURRED VISION: 0
MYALGIAS: 0
WEAKNESS: 1
DEPRESSION: 1
VOMITING: 0
DIZZINESS: 0
EYE PAIN: 0
NERVOUS/ANXIOUS: 1
MEMORY LOSS: 0
NAUSEA: 0
HEADACHES: 0
HEARTBURN: 0
BACK PAIN: 0
COUGH: 0
CHILLS: 0
SPUTUM PRODUCTION: 0
FEVER: 0

## 2017-01-31 NOTE — PROGRESS NOTES
Pt resting in bed, A&Ox4, complaining of mild h/a, R sided weakness still present, per pt R side has got stronger. No s/s of distress, all pt questions answered, bed alarm on, call light within reach, will continue to monitor

## 2017-01-31 NOTE — PROGRESS NOTES
Received report from off going nurse. Assumed care at this time. Patient is asleep in bed; call light within reach; bed at lowest position.

## 2017-01-31 NOTE — CARE PLAN
Problem: Safety  Goal: Will remain free from injury  Outcome: PROGRESSING AS EXPECTED  Patient advised to call for any assistance as needed

## 2017-01-31 NOTE — CARE PLAN
Problem: Skin Integrity  Goal: Risk for impaired skin integrity will decrease  Outcome: PROGRESSING AS EXPECTED  Patient will be turned q2 and will remain injury free with no skin issues

## 2017-01-31 NOTE — CARE PLAN
Problem: Venous Thromboembolism (VTW)/Deep Vein Thrombosis (DVT) Prevention:  Goal: Patient will participate in Venous Thrombosis (VTE)/Deep Vein Thrombosis (DVT)Prevention Measures  Intervention: Assess and monitor for anticoagulation complications  Receiving Lovenox, no s/s of DVT.       Problem: Knowledge Deficit  Goal: Knowledge of disease process/condition, treatment plan, diagnostic tests, and medications will improve  Intervention: Assess knowledge level of disease process/condition, treatment plan, diagnostic tests, and medications  Reviewing plan of care, activities, and medication with patient.  Encouraging patient to ask questions and participate in plan of care.  Providing answers to all questions.  Continuing with current plan of care.  Hourly rounding in practice.

## 2017-02-01 VITALS
RESPIRATION RATE: 18 BRPM | TEMPERATURE: 98.1 F | WEIGHT: 314.16 LBS | HEIGHT: 77 IN | OXYGEN SATURATION: 99 % | SYSTOLIC BLOOD PRESSURE: 109 MMHG | BODY MASS INDEX: 37.09 KG/M2 | DIASTOLIC BLOOD PRESSURE: 62 MMHG | HEART RATE: 56 BPM

## 2017-02-01 PROCEDURE — 700102 HCHG RX REV CODE 250 W/ 637 OVERRIDE(OP): Performed by: INTERNAL MEDICINE

## 2017-02-01 PROCEDURE — A9270 NON-COVERED ITEM OR SERVICE: HCPCS | Performed by: INTERNAL MEDICINE

## 2017-02-01 PROCEDURE — G0378 HOSPITAL OBSERVATION PER HR: HCPCS

## 2017-02-01 PROCEDURE — 700112 HCHG RX REV CODE 229: Performed by: INTERNAL MEDICINE

## 2017-02-01 PROCEDURE — 99217 PR OBSERVATION CARE DISCHARGE: CPT | Performed by: INTERNAL MEDICINE

## 2017-02-01 PROCEDURE — 700111 HCHG RX REV CODE 636 W/ 250 OVERRIDE (IP): Performed by: INTERNAL MEDICINE

## 2017-02-01 RX ORDER — DIVALPROEX SODIUM 500 MG/1
1500 TABLET, EXTENDED RELEASE ORAL EVERY EVENING
Qty: 90 TAB | Refills: 1 | Status: SHIPPED | OUTPATIENT
Start: 2017-02-01 | End: 2018-02-01

## 2017-02-01 RX ORDER — DIVALPROEX SODIUM 500 MG/1
1500 TABLET, EXTENDED RELEASE ORAL EVERY EVENING
Qty: 90 TAB | Refills: 1 | Status: SHIPPED | OUTPATIENT
Start: 2017-02-01 | End: 2017-02-01

## 2017-02-01 RX ADMIN — ENOXAPARIN SODIUM 40 MG: 100 INJECTION SUBCUTANEOUS at 08:23

## 2017-02-01 RX ADMIN — LEVOTHYROXINE SODIUM 150 MCG: 150 TABLET ORAL at 05:12

## 2017-02-01 RX ADMIN — DOCUSATE SODIUM 100 MG: 100 CAPSULE ORAL at 08:23

## 2017-02-01 RX ADMIN — SERTRALINE 100 MG: 100 TABLET, FILM COATED ORAL at 08:23

## 2017-02-01 ASSESSMENT — PAIN SCALES - GENERAL: PAINLEVEL_OUTOF10: 0

## 2017-02-01 NOTE — PROGRESS NOTES
Pt aaox4. R sided weakness 4/5. +BS. Voiding w/o difficulty. Reviewed poc with pt-verbalized understanding. Pt to d/c back to group home today. Bed alarm in use. Call light in reach.

## 2017-02-01 NOTE — CARE PLAN
Problem: Safety  Goal: Will remain free from falls  Intervention: Implement fall precautions  Bed alarm in use. Call light w/in reach. Instructed pt to call for assistance      Problem: Knowledge Deficit  Goal: Knowledge of the prescribed therapeutic regimen will improve  Intervention: Discuss information regarding therpeutic regimen and document in education  poc discussed. Pt to d/c home today. Pt verbalized understanding.

## 2017-02-01 NOTE — PROGRESS NOTES
Hospital Medicine Progress Note, Adult, Complex               Author: Gretchen Galvan Date & Time created: 1/31/2017  4:28 PM     Interval History:  Patient with improving right sided weakness, unprovoked and no organic cause for.  He was evaluated by psychiatry and recommended to continue his same medications.  Stroke w/u completed and is negative. Patient has been hospitalized with similar complaints that spontaneously resolve - conversion disorder.  He is moving his arm and leg more and should be okay to dc back to his group home tomorrow.    Review of Systems:  Review of Systems   Constitutional: Negative for fever and chills.   HENT: Negative for ear discharge and nosebleeds.    Eyes: Negative for blurred vision and pain.   Respiratory: Negative for cough and sputum production.    Cardiovascular: Negative for chest pain and leg swelling.   Gastrointestinal: Negative for heartburn, nausea and vomiting.   Genitourinary: Negative for dysuria and frequency.   Musculoskeletal: Negative for myalgias and back pain.   Skin: Negative for itching and rash.   Neurological: Positive for focal weakness (right sided weakness getting better) and weakness. Negative for dizziness and headaches.   Psychiatric/Behavioral: Positive for depression. Negative for memory loss. The patient is nervous/anxious.        Physical Exam:  Physical Exam   Constitutional: He is oriented to person, place, and time. He appears well-developed and well-nourished. No distress.   HENT:   Head: Normocephalic and atraumatic.   Eyes: Conjunctivae are normal. No scleral icterus.   Neck: Neck supple. No JVD present.   Cardiovascular: Normal rate, regular rhythm and intact distal pulses.  Exam reveals no gallop and no friction rub.    No murmur heard.  Pulmonary/Chest: Effort normal and breath sounds normal. No respiratory distress.   Abdominal: Soft. Bowel sounds are normal. He exhibits no distension and no mass. There is no tenderness.   Musculoskeletal:  He exhibits no tenderness.   Neurological: He is alert and oriented to person, place, and time. No cranial nerve deficit.   Skin: He is not diaphoretic.   Psychiatric: He has a normal mood and affect.   Nursing note and vitals reviewed.      Labs:        Invalid input(s): [id number]      No results for input(s): SODIUM, POTASSIUM, CHLORIDE, CO2, BUN, CREATININE, MAGNESIUM, PHOSPHORUS, CALCIUM in the last 72 hours.  No results for input(s): ALTSGPT, ASTSGOT, ALKPHOSPHAT, TBILIRUBIN, DBILIRUBIN, GAMMAGT, AMYLASE, LIPASE, ALB, PREALBUMIN, GLUCOSE in the last 72 hours.  No results for input(s): RBC, HEMOGLOBIN, HEMATOCRIT, PLATELETCT, PROTHROMBTM, APTT, INR, IRON, FERRITIN, TOTIRONBC in the last 72 hours.            Hemodynamics:  Temp (24hrs), Av.7 °C (98.1 °F), Min:36.2 °C (97.2 °F), Max:37.5 °C (99.5 °F)  Temperature: 36.2 °C (97.2 °F)  Pulse  Av  Min: 52  Max: 76  Blood Pressure: 130/62 mmHg     Respiratory:    Respiration: 16, Pulse Oximetry: 98 %        RUL Breath Sounds: Clear, RML Breath Sounds: Clear, RLL Breath Sounds: Clear, LEONOR Breath Sounds: Clear, LLL Breath Sounds: Clear  Fluids:    Intake/Output Summary (Last 24 hours) at 17 1628  Last data filed at 17 1000   Gross per 24 hour   Intake   1500 ml   Output   1900 ml   Net   -400 ml       GI/Nutrition:  Orders Placed This Encounter   Procedures   • Diet Order     Standing Status: Standing      Number of Occurrences: 1      Standing Expiration Date:      Order Specific Question:  Diet:     Answer:  Regular [1]     Medical Decision Making, by Problem:  Active Hospital Problems    Diagnosis   • Right sided weakness [M62.81]improving with therapies     • Conversion disorder [F44.9]supportive care     • Hypothyroid [E03.9]synthroid     • Depression [F32.9]continue home meds    Bipolar d/o - zolof, depakote/prazosin         Labs reviewed, Radiology images reviewed and Medications reviewed  Contreras catheter: No Contreras      DVT Prophylaxis:  Enoxaparin (Lovenox)        Assessed for rehab: Patient was assess for and/or received rehabilitation services during this hospitalization

## 2017-02-01 NOTE — CARE PLAN
Problem: Safety  Goal: Will remain free from injury  Intervention: Collaborate with Interdisciplinary Team for safe transfer and mobilization techniques  Communicated increased patient needs with staff.  Patient educated about safety and fall precaution.    Discussed patient's fall risk assessment with health care team.  Bed alarm on. Educated about call light use.  Hourly rounding in practice.      Problem: Skin Integrity  Goal: Risk for impaired skin integrity will decrease  Intervention: Assess risk factors for impaired skin integrity and/or pressure ulcers  Demar scale being used to assess skin break down risks.  Providing assistance with repositioning.  Q2 turns in place. Collaborating and communicating with health care team to prevent pressure ulcer.  Hourly rounding in practice.

## 2017-02-01 NOTE — DISCHARGE INSTRUCTIONS
Discharge Instructions    Discharged to group home by car with escort. Discharged via wheelchair, hospital escort: Yes.  Special equipment needed: Not Applicable    Be sure to schedule a follow-up appointment with your primary care doctor or any specialists as instructed.     Discharge Plan:   Influenza Vaccine Indication: Not indicated: Previously immunized this influenza season and > 8 years of age    I understand that a diet low in cholesterol, fat, and sodium is recommended for good health. Unless I have been given specific instructions below for another diet, I accept this instruction as my diet prescription.   Other diet: Regular    Special Instructions: None    · Is patient discharged on Warfarin / Coumadin?   No     · Is patient Post Blood Transfusion?  No    Depression / Suicide Risk    As you are discharged from this [facility] facility, it is important to learn how to keep safe from harming yourself.    Recognize the warning signs:  · Abrupt changes in personality, positive or negative- including increase in energy   · Giving away possessions  · Change in eating patterns- significant weight changes-  positive or negative  · Change in sleeping patterns- unable to sleep or sleeping all the time   · Unwillingness or inability to communicate  · Depression  · Unusual sadness, discouragement and loneliness  · Talk of wanting to die  · Neglect of personal appearance   · Rebelliousness- reckless behavior  · Withdrawal from people/activities they love  · Confusion- inability to concentrate     If you or a loved one observes any of these behaviors or has concerns about self-harm, here's what you can do:  · Talk about it- your feelings and reasons for harming yourself  · Remove any means that you might use to hurt yourself (examples: pills, rope, extension cords, firearm)  · Get professional help from the community (Mental Health, Substance Abuse, psychological counseling)  · Do not be alone:Call your Safe  Contact- someone whom you trust who will be there for you.  · Call your local CRISIS HOTLINE 639-5242 or 031-791-4960  · Call your local Children's Mobile Crisis Response Team [place] (821) 679-6268 or www.Vuzix  · Call the toll free National Suicide Prevention Hotlines   · National Suicide Prevention Lifeline 134-375-YLXH (5032)  · [employer] Line Network 800-SUICIDE (664-7084)      Conversion Disorder  Conversion disorder is also known as functional neurological symptom disorder. People with this mental disorder have symptoms that suggest a brain or nervous system condition, such as a stroke or seizure. However, no such condition can be found to explain the symptoms. For people with conversion disorder, the symptoms may result in:  · Severe distress or anxiety.  · Disruption of relationships or other normal life activities.  · Medical evaluation. This often occurs in the primary care or emergency room setting.  Conversion disorder may begin anytime from late childhood through the young adult years. This disorder is more common in females than males.   CAUSES  The exact cause of this disorder is unknown. It is often triggered by stressful life events involving personal relationships.  SIGNS AND SYMPTOMS  Signs and symptoms of conversion disorder may include:  · Muscle weakness or paralysis. If this involves the bladder muscle, it can cause difficulty urinating.  · Seizures or attacks of being unresponsive.  · Abnormal movement, such as tremors, jerks, muscle spasms, loss of balance, or difficulty walking.  · Difficulty swallowing or a feeling of having a lump in the throat.  · Loss of speech (aphonia) or slurring of words (dysarthria).  · Loss of the sensation of touch or pain.  · Changes in vision, such as blindness or double vision.  · Seeing things that are not there (hallucinations).  · Changes in hearing, such as deafness.  Symptoms usually occur suddenly but may increase gradually over  time. They usually go away completely in a short time. Seizures and tremors are more likely than other conversion symptoms to come back or continue.  DIAGNOSIS  Diagnosis of this disorder starts with an evaluation by your health care provider. Exams and tests will be done to rule out serious physical health problems. The evaluation will vary depending on your specific symptoms. It may include:  · A physical exam, including a neurological exam.  · Lab tests on blood or urine samples.  · X-rays or other imaging studies.  · An electroencephalogram (EEG) to monitor brain waves for seizure activity.  Your health care provider may refer you to a mental health specialist for psychological evaluation.  TREATMENT  The main goal of treatment is to get the symptoms to go away as quickly as possible. Most people with this disorder respond well to relaxation techniques and reassurance from their health care provider that the symptoms are stress related. For people with symptoms that do not go away, the following treatment options are available:  · Counseling or talk therapy. Talk therapy is provided by mental health specialists. It involves discussing stressful life events that may have triggered the symptoms and ways to cope with these issues.  · Hypnotherapy. This is the use of hypnosis to help a person overcome conversion symptoms. It is provided by mental health specialists.  · Amobarbital interview. This involves discussing stressful life events that may have triggered the symptoms. The mental health specialist asks you questions after you take a short-acting medicine (amobarbital) that produces a hypnotic state.  · Medicine. Antidepressant medicine may be helpful even if a person with conversion symptoms is not depressed.  · Physical therapy. Physical therapy can help maintain muscle strength in cases of long-term paralysis.  HOME CARE INSTRUCTIONS  · Take medicines only as directed by your health care provider.  · Try to  reduce stress.  · Keep all follow-up visits as directed by your health care provider. This is important.  SEEK MEDICAL CARE IF:  · Your symptoms do not go away or they become severe.  · You develop new symptoms.  SEEK IMMEDIATE MEDICAL CARE IF:  You have serious thoughts about hurting yourself or someone else.     This information is not intended to replace advice given to you by your health care provider. Make sure you discuss any questions you have with your health care provider.     Document Released: 01/20/2012 Document Revised: 01/08/2016 Document Reviewed: 04/30/2015  [employer] Interactive Patient Education ©2016 [employer]
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
## 2017-02-01 NOTE — DISCHARGE PLANNING
Referral: Follow Up    Intervention: Per RN, pt is medically clear for discharge home, pt is requesting a Cab Voucher.  SW met with pt at bedside.  Pt states he has contacted Peri at [facility] and informed her of his discharge, pt does not want this writer to contact [employer] for verification of acceptance, Cab Voucher provided.    Plan: As Above.

## 2017-02-01 NOTE — PROGRESS NOTES
Went over discharge instructions w/ pt, when to call the doctor, f/u appointments, medications. Prescription and copy of discharge paperwork given to pt. Pt had no further questions. Cab called for pt.

## 2017-02-01 NOTE — PROGRESS NOTES
Pt awake, A&Ox4, R sided weakness improving, no complaints of pain, all pt needs met, no s/s of distress, bed alarm on, call light within reach.

## 2017-02-05 NOTE — DISCHARGE SUMMARY
DIAGNOSES:  Right-sided weakness without organic cause identified, conversion   disorder, hypothyroid, depression, and bipolar disorder.    REASON FOR HOSPITALIZATION:  The patient is a 36-year-old male who resides in   a group home, came in complaining of right-sided weakness, history of seizures   without witnessed events and depression.  Patient complained that he was   playing video games and had sudden onset of right-sided weakness.  When   patient initially was seen, his right side was flaccid; however, with   manipulation he is able to move the right side.  Neurology, Dr. Becerril was   consulted by the emergency room physician and evaluated the inconsistencies of   his exam and recommended that his Depakote was increased to 1500 mg p.o. at   bedtime and no further workup is appropriate.  Patient was also seen by   psychiatry during his hospital stay recommending to continue his medications   for his bipolar disorder and supporting that this is likely a conversion   disorder.  At this time, patient's right-sided weakness has greatly improved.    He was working with physical and occupational therapy and is appropriate for   discharge back to the group home.  The patient states he feels he is back at   his baseline.    DISCHARGE MEDICATIONS:  1.  Depakote extended release 1500 mg p.o. at bedtime.  2.  Synthroid 125 mcg p.o. q.a.m.  3.  Prazosin 2 mg p.o. at bedtime.  4.  Zoloft 100 mg p.o. b.i.d.    DIET:  As tolerated.    ACTIVITY:  As tolerated.    Discharge instructions and followup with his primary care, Dr. Long Linares   is recommended on discharge.  Discharge process lasted approximately 35   minutes.       ____________________________________     DO RACHAEL Correa / ADORE    DD:  02/03/2017 17:27:40  DT:  02/04/2017 16:24:04    D#:  [id number]  Job#:  [id number]

## 2017-02-15 ENCOUNTER — HOSPITAL ENCOUNTER (OUTPATIENT)
Dept: LAB | Facility: MEDICAL CENTER | Age: 37
End: 2017-02-15
Attending: NURSE PRACTITIONER
Payer: MEDICAID

## 2017-02-15 LAB
ALBUMIN SERPL BCP-MCNC: 4.4 G/DL (ref 3.2–4.9)
ALBUMIN/GLOB SERPL: 1.8 G/DL
ALP SERPL-CCNC: 53 U/L (ref 30–99)
ALT SERPL-CCNC: 18 U/L (ref 2–50)
ANION GAP SERPL CALC-SCNC: 10 MMOL/L (ref 0–11.9)
AST SERPL-CCNC: 17 U/L (ref 12–45)
BASOPHILS # BLD AUTO: 0.11 K/UL (ref 0–0.12)
BASOPHILS NFR BLD AUTO: 1.2 % (ref 0–1.8)
BILIRUB SERPL-MCNC: 0.3 MG/DL (ref 0.1–1.5)
BUN SERPL-MCNC: 23 MG/DL (ref 8–22)
CALCIUM SERPL-MCNC: 9.8 MG/DL (ref 8.5–10.5)
CHLORIDE SERPL-SCNC: 102 MMOL/L (ref 96–112)
CHOLEST SERPL-MCNC: 231 MG/DL (ref 100–199)
CO2 SERPL-SCNC: 25 MMOL/L (ref 20–33)
CREAT SERPL-MCNC: 0.9 MG/DL (ref 0.5–1.4)
EOSINOPHIL # BLD: 0.16 K/UL (ref 0–0.51)
EOSINOPHIL NFR BLD AUTO: 1.7 % (ref 0–6.9)
ERYTHROCYTE [DISTWIDTH] IN BLOOD BY AUTOMATED COUNT: 41.8 FL (ref 35.9–50)
GLOBULIN SER CALC-MCNC: 2.5 G/DL (ref 1.9–3.5)
GLUCOSE SERPL-MCNC: 163 MG/DL (ref 65–99)
HCT VFR BLD AUTO: 41.5 % (ref 42–52)
HDLC SERPL-MCNC: 31 MG/DL
HGB BLD-MCNC: 13.7 G/DL (ref 14–18)
IMM GRANULOCYTES # BLD AUTO: 0.14 K/UL (ref 0–0.11)
IMM GRANULOCYTES NFR BLD AUTO: 1.5 % (ref 0–0.9)
LDLC SERPL CALC-MCNC: ABNORMAL MG/DL
LYMPHOCYTES # BLD: 3.03 K/UL (ref 1–4.8)
LYMPHOCYTES NFR BLD AUTO: 31.9 % (ref 22–41)
MCH RBC QN AUTO: 30.2 PG (ref 27–33)
MCHC RBC AUTO-ENTMCNC: 33 G/DL (ref 33.7–35.3)
MCV RBC AUTO: 91.4 FL (ref 81.4–97.8)
MONOCYTES # BLD: 0.89 K/UL (ref 0–0.85)
MONOCYTES NFR BLD AUTO: 9.4 % (ref 0–13.4)
NEUTROPHILS # BLD: 5.18 K/UL (ref 1.82–7.42)
NEUTROPHILS NFR BLD AUTO: 54.3 % (ref 44–72)
NRBC # BLD AUTO: 0 K/UL
NRBC BLD-RTO: 0 /100 WBC
PLATELET # BLD AUTO: 321 K/UL (ref 164–446)
PMV BLD AUTO: 10.8 FL (ref 9–12.9)
POTASSIUM SERPL-SCNC: 4.1 MMOL/L (ref 3.6–5.5)
PROT SERPL-MCNC: 6.9 G/DL (ref 6–8.2)
RBC # BLD AUTO: 4.54 M/UL (ref 4.7–6.1)
SODIUM SERPL-SCNC: 137 MMOL/L (ref 135–145)
T3FREE SERPL-MCNC: 3.05 PG/ML (ref 2.4–4.2)
T4 FREE SERPL-MCNC: 0.83 NG/DL (ref 0.53–1.43)
TRIGL SERPL-MCNC: 942 MG/DL (ref 0–149)
TSH SERPL DL<=0.005 MIU/L-ACNC: 8.95 UIU/ML (ref 0.3–3.7)
VALPROATE SERPL-MCNC: 59.7 UG/ML (ref 50–100)
WBC # BLD AUTO: 9.5 K/UL (ref 4.8–10.8)

## 2017-02-15 PROCEDURE — 84443 ASSAY THYROID STIM HORMONE: CPT

## 2017-02-15 PROCEDURE — 36415 COLL VENOUS BLD VENIPUNCTURE: CPT

## 2017-02-15 PROCEDURE — 80053 COMPREHEN METABOLIC PANEL: CPT

## 2017-02-15 PROCEDURE — 84481 FREE ASSAY (FT-3): CPT

## 2017-02-15 PROCEDURE — 80164 ASSAY DIPROPYLACETIC ACD TOT: CPT

## 2017-02-15 PROCEDURE — 85025 COMPLETE CBC W/AUTO DIFF WBC: CPT

## 2017-02-15 PROCEDURE — 80061 LIPID PANEL: CPT

## 2017-02-15 PROCEDURE — 84439 ASSAY OF FREE THYROXINE: CPT

## 2017-04-01 ENCOUNTER — APPOINTMENT (OUTPATIENT)
Dept: RADIOLOGY | Facility: MEDICAL CENTER | Age: 37
End: 2017-04-01
Attending: EMERGENCY MEDICINE
Payer: MEDICAID

## 2017-04-01 ENCOUNTER — HOSPITAL ENCOUNTER (EMERGENCY)
Facility: MEDICAL CENTER | Age: 37
End: 2017-04-01
Attending: EMERGENCY MEDICINE
Payer: MEDICAID

## 2017-04-01 VITALS
DIASTOLIC BLOOD PRESSURE: 67 MMHG | TEMPERATURE: 98.1 F | SYSTOLIC BLOOD PRESSURE: 150 MMHG | BODY MASS INDEX: 34.71 KG/M2 | HEART RATE: 65 BPM | WEIGHT: 300 LBS | HEIGHT: 78 IN | OXYGEN SATURATION: 95 % | RESPIRATION RATE: 16 BRPM

## 2017-04-01 DIAGNOSIS — S16.1XXA CERVICAL STRAIN, INITIAL ENCOUNTER: ICD-10-CM

## 2017-04-01 DIAGNOSIS — S09.90XA CHI (CLOSED HEAD INJURY), INITIAL ENCOUNTER: ICD-10-CM

## 2017-04-01 DIAGNOSIS — S80.02XA CONTUSION OF LEFT KNEE, INITIAL ENCOUNTER: ICD-10-CM

## 2017-04-01 PROCEDURE — 70450 CT HEAD/BRAIN W/O DYE: CPT

## 2017-04-01 PROCEDURE — 72125 CT NECK SPINE W/O DYE: CPT

## 2017-04-01 PROCEDURE — 99284 EMERGENCY DEPT VISIT MOD MDM: CPT

## 2017-04-01 PROCEDURE — A9270 NON-COVERED ITEM OR SERVICE: HCPCS | Performed by: EMERGENCY MEDICINE

## 2017-04-01 PROCEDURE — 71010 DX-CHEST-LIMITED (1 VIEW): CPT

## 2017-04-01 PROCEDURE — 700102 HCHG RX REV CODE 250 W/ 637 OVERRIDE(OP): Performed by: EMERGENCY MEDICINE

## 2017-04-01 PROCEDURE — 73564 X-RAY EXAM KNEE 4 OR MORE: CPT | Mod: LT

## 2017-04-01 RX ORDER — HYDROCODONE BITARTRATE AND ACETAMINOPHEN 5; 325 MG/1; MG/1
1-2 TABLET ORAL EVERY 6 HOURS PRN
Qty: 10 TAB | Refills: 0 | Status: SHIPPED | OUTPATIENT
Start: 2017-04-01 | End: 2017-05-28

## 2017-04-01 RX ORDER — MORPHINE SULFATE 4 MG/ML
4 INJECTION, SOLUTION INTRAMUSCULAR; INTRAVENOUS
Status: DISCONTINUED | OUTPATIENT
Start: 2017-04-01 | End: 2017-04-01 | Stop reason: HOSPADM

## 2017-04-01 RX ORDER — OXYCODONE AND ACETAMINOPHEN 7.5; 325 MG/1; MG/1
1 TABLET ORAL ONCE
Status: COMPLETED | OUTPATIENT
Start: 2017-04-01 | End: 2017-04-01

## 2017-04-01 RX ADMIN — OXYCODONE AND ACETAMINOPHEN 1 TABLET: 7.5; 325 TABLET ORAL at 14:55

## 2017-04-01 ASSESSMENT — PAIN SCALES - GENERAL
PAINLEVEL_OUTOF10: 5
PAINLEVEL_OUTOF10: ASSUMED PAIN PRESENT

## 2017-04-01 NOTE — ED AVS SNAPSHOT
[employer] Access Code: [email or web address]  Expires: 5/1/2017  3:11 PM    [employer]  A secure, online tool to manage your health information     [employer]’s [employer]® is a secure, online tool that connects you to your personalized health information from the privacy of your home -- day or night - making it very easy for you to manage your healthcare. Once the activation process is completed, you can even access your medical information using the [employer] jodi, which is available for free in the Apple Jodi store or Google Play store.     [employer] provides the following levels of access (as shown below):   My Chart Features   [facility] Primary Care Doctor [facility]  Specialists [facility]  Urgent  Care Non-[facility]  Primary Care  Doctor   Email your healthcare team securely and privately 24/7 X X X X   Manage appointments: schedule your next appointment; view details of past/upcoming appointments X      Request prescription refills. X      View recent personal medical records, including lab and immunizations X X X X   View health record, including health history, allergies, medications X X X X   Read reports about your outpatient visits, procedures, consult and ER notes X X X X   See your discharge summary, which is a recap of your hospital and/or ER visit that includes your diagnosis, lab results, and care plan. X X       How to register for [employer]:  1. Go to  [email or web address].  2. Click on the Sign Up Now box, which takes you to the New Member Sign Up page. You will need to provide the following information:  a. Enter your [employer] Access Code exactly as it appears at the top of this page. (You will not need to use this code after you’ve completed the sign-up process. If you do not sign up before the expiration date, you must request a new code.)   b. Enter your date of birth.   c. Enter your home email address.   d. Click Submit, and follow the next screen’s instructions.  3. Create a [employer] ID. This will be your [employer]  login ID and cannot be changed, so think of one that is secure and easy to remember.  4. Create a [employer] password. You can change your password at any time.  5. Enter your Password Reset Question and Answer. This can be used at a later time if you forget your password.   6. Enter your e-mail address. This allows you to receive e-mail notifications when new information is available in [employer].  7. Click Sign Up. You can now view your health information.    For assistance activating your [employer] account, call [phone]
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
## 2017-04-01 NOTE — ED AVS SNAPSHOT
4/1/2017          Norm Prabhakar  [address] [facility] [place] [address]    Dear Norm:    [facility] wants to ensure your discharge home is safe and you or your loved ones have had all your questions answered regarding your care after you leave the hospital.    You may receive a telephone call within two days of your discharge.  This call is to make certain you understand your discharge instructions as well as ensure we provided you with the best care possible during your stay with us.     The call will only last approximately 3-5 minutes and will be done by a nurse.    Once again, we want to ensure your discharge home is safe and that you have a clear understanding of any next steps in your care.  If you have any questions or concerns, please do not hesitate to contact us, we are here for you.  Thank you for choosing [facility] for your healthcare needs.    Sincerely,    Alex Vasquez    [facility]
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
## 2017-04-02 NOTE — ED NOTES
Discharge instructions given to pt including follow up w/orthopaedic doctor in 3 days or follow up w/pcp for any worsening symptoms. No new complaints made. Questions answered by RN. Pt wheeled to the lobby by ED tech. Prescription given to pt and instructed no driving no drinking alcohol while on prescribed pain medication.

## 2017-04-15 ENCOUNTER — HOSPITAL ENCOUNTER (EMERGENCY)
Facility: MEDICAL CENTER | Age: 37
End: 2017-04-16
Attending: EMERGENCY MEDICINE
Payer: MEDICAID

## 2017-04-15 DIAGNOSIS — K52.9 GASTROENTERITIS: ICD-10-CM

## 2017-04-15 LAB
ALBUMIN SERPL BCP-MCNC: 4.8 G/DL (ref 3.2–4.9)
ALBUMIN/GLOB SERPL: 1.7 G/DL
ALP SERPL-CCNC: 69 U/L (ref 30–99)
ALT SERPL-CCNC: 24 U/L (ref 2–50)
ANION GAP SERPL CALC-SCNC: 11 MMOL/L (ref 0–11.9)
APPEARANCE UR: CLEAR
AST SERPL-CCNC: 24 U/L (ref 12–45)
BASOPHILS # BLD AUTO: 1 % (ref 0–1.8)
BASOPHILS # BLD: 0.11 K/UL (ref 0–0.12)
BILIRUB SERPL-MCNC: 0.8 MG/DL (ref 0.1–1.5)
BILIRUB UR QL STRIP.AUTO: NEGATIVE
BUN SERPL-MCNC: 22 MG/DL (ref 8–22)
CALCIUM SERPL-MCNC: 9.5 MG/DL (ref 8.5–10.5)
CHLORIDE SERPL-SCNC: 100 MMOL/L (ref 96–112)
CO2 SERPL-SCNC: 27 MMOL/L (ref 20–33)
COLOR UR: YELLOW
CREAT SERPL-MCNC: 0.96 MG/DL (ref 0.5–1.4)
CULTURE IF INDICATED INDCX: NO UA CULTURE
EKG IMPRESSION: NORMAL
EOSINOPHIL # BLD AUTO: 0.18 K/UL (ref 0–0.51)
EOSINOPHIL NFR BLD: 1.6 % (ref 0–6.9)
ERYTHROCYTE [DISTWIDTH] IN BLOOD BY AUTOMATED COUNT: 43.2 FL (ref 35.9–50)
GFR SERPL CREATININE-BSD FRML MDRD: >60 ML/MIN/1.73 M 2
GLOBULIN SER CALC-MCNC: 2.9 G/DL (ref 1.9–3.5)
GLUCOSE SERPL-MCNC: 106 MG/DL (ref 65–99)
GLUCOSE UR STRIP.AUTO-MCNC: NEGATIVE MG/DL
HCT VFR BLD AUTO: 42.5 % (ref 42–52)
HGB BLD-MCNC: 14.3 G/DL (ref 14–18)
IMM GRANULOCYTES # BLD AUTO: 0.06 K/UL (ref 0–0.11)
IMM GRANULOCYTES NFR BLD AUTO: 0.5 % (ref 0–0.9)
KETONES UR STRIP.AUTO-MCNC: NEGATIVE MG/DL
LEUKOCYTE ESTERASE UR QL STRIP.AUTO: NEGATIVE
LIPASE SERPL-CCNC: 15 U/L (ref 11–82)
LYMPHOCYTES # BLD AUTO: 3.23 K/UL (ref 1–4.8)
LYMPHOCYTES NFR BLD: 28.3 % (ref 22–41)
MCH RBC QN AUTO: 30.5 PG (ref 27–33)
MCHC RBC AUTO-ENTMCNC: 33.6 G/DL (ref 33.7–35.3)
MCV RBC AUTO: 90.6 FL (ref 81.4–97.8)
MICRO URNS: NORMAL
MONOCYTES # BLD AUTO: 1.03 K/UL (ref 0–0.85)
MONOCYTES NFR BLD AUTO: 9 % (ref 0–13.4)
NEUTROPHILS # BLD AUTO: 6.82 K/UL (ref 1.82–7.42)
NEUTROPHILS NFR BLD: 59.6 % (ref 44–72)
NITRITE UR QL STRIP.AUTO: NEGATIVE
NRBC # BLD AUTO: 0 K/UL
NRBC BLD AUTO-RTO: 0 /100 WBC
PH UR STRIP.AUTO: 6 [PH]
PLATELET # BLD AUTO: 339 K/UL (ref 164–446)
PMV BLD AUTO: 10 FL (ref 9–12.9)
POTASSIUM SERPL-SCNC: 3.5 MMOL/L (ref 3.6–5.5)
PROT SERPL-MCNC: 7.7 G/DL (ref 6–8.2)
PROT UR QL STRIP: NEGATIVE MG/DL
RBC # BLD AUTO: 4.69 M/UL (ref 4.7–6.1)
RBC UR QL AUTO: NEGATIVE
SODIUM SERPL-SCNC: 138 MMOL/L (ref 135–145)
SP GR UR STRIP.AUTO: 1.02
WBC # BLD AUTO: 11.4 K/UL (ref 4.8–10.8)

## 2017-04-15 PROCEDURE — 83690 ASSAY OF LIPASE: CPT

## 2017-04-15 PROCEDURE — 36415 COLL VENOUS BLD VENIPUNCTURE: CPT

## 2017-04-15 PROCEDURE — 96375 TX/PRO/DX INJ NEW DRUG ADDON: CPT

## 2017-04-15 PROCEDURE — 80053 COMPREHEN METABOLIC PANEL: CPT

## 2017-04-15 PROCEDURE — 96372 THER/PROPH/DIAG INJ SC/IM: CPT

## 2017-04-15 PROCEDURE — 96361 HYDRATE IV INFUSION ADD-ON: CPT

## 2017-04-15 PROCEDURE — 81003 URINALYSIS AUTO W/O SCOPE: CPT

## 2017-04-15 PROCEDURE — 93005 ELECTROCARDIOGRAM TRACING: CPT | Performed by: EMERGENCY MEDICINE

## 2017-04-15 PROCEDURE — 85025 COMPLETE CBC W/AUTO DIFF WBC: CPT

## 2017-04-15 PROCEDURE — 96374 THER/PROPH/DIAG INJ IV PUSH: CPT

## 2017-04-15 PROCEDURE — 700111 HCHG RX REV CODE 636 W/ 250 OVERRIDE (IP): Performed by: EMERGENCY MEDICINE

## 2017-04-15 PROCEDURE — 99285 EMERGENCY DEPT VISIT HI MDM: CPT

## 2017-04-15 PROCEDURE — 700105 HCHG RX REV CODE 258: Performed by: EMERGENCY MEDICINE

## 2017-04-15 RX ORDER — ONDANSETRON 2 MG/ML
4 INJECTION INTRAMUSCULAR; INTRAVENOUS ONCE
Status: COMPLETED | OUTPATIENT
Start: 2017-04-15 | End: 2017-04-15

## 2017-04-15 RX ORDER — DICYCLOMINE HYDROCHLORIDE 10 MG/ML
20 INJECTION INTRAMUSCULAR ONCE
Status: COMPLETED | OUTPATIENT
Start: 2017-04-15 | End: 2017-04-15

## 2017-04-15 RX ORDER — SODIUM CHLORIDE 9 MG/ML
1000 INJECTION, SOLUTION INTRAVENOUS ONCE
Status: COMPLETED | OUTPATIENT
Start: 2017-04-15 | End: 2017-04-16

## 2017-04-15 RX ADMIN — ONDANSETRON 4 MG: 2 INJECTION INTRAMUSCULAR; INTRAVENOUS at 23:03

## 2017-04-15 RX ADMIN — SODIUM CHLORIDE 1000 ML: 9 INJECTION, SOLUTION INTRAVENOUS at 23:00

## 2017-04-15 RX ADMIN — DICYCLOMINE HYDROCHLORIDE 20 MG: 20 INJECTION, SOLUTION INTRAMUSCULAR at 23:03

## 2017-04-15 NOTE — ED AVS SNAPSHOT
Wallit Access Code: [email or web address]  Expires: 5/1/2017  3:11 PM    Wallit  A secure, online tool to manage your health information     [employer]’s Wallit® is a secure, online tool that connects you to your personalized health information from the privacy of your home -- day or night - making it very easy for you to manage your healthcare. Once the activation process is completed, you can even access your medical information using the Wallit jodi, which is available for free in the Apple Jodi store or Google Play store.     Wallit provides the following levels of access (as shown below):   My Chart Features   [facility] Primary Care Doctor [facility]  Specialists [facility]  Urgent  Care Non-[facility]  Primary Care  Doctor   Email your healthcare team securely and privately 24/7 X X X X   Manage appointments: schedule your next appointment; view details of past/upcoming appointments X      Request prescription refills. X      View recent personal medical records, including lab and immunizations X X X X   View health record, including health history, allergies, medications X X X X   Read reports about your outpatient visits, procedures, consult and ER notes X X X X   See your discharge summary, which is a recap of your hospital and/or ER visit that includes your diagnosis, lab results, and care plan. X X       How to register for Wallit:  1. Go to  [email or web address].  2. Click on the Sign Up Now box, which takes you to the New Member Sign Up page. You will need to provide the following information:  a. Enter your Wallit Access Code exactly as it appears at the top of this page. (You will not need to use this code after you’ve completed the sign-up process. If you do not sign up before the expiration date, you must request a new code.)   b. Enter your date of birth.   c. Enter your home email address.   d. Click Submit, and follow the next screen’s instructions.  3. Create a Wallit ID. This will be your [employer]  login ID and cannot be changed, so think of one that is secure and easy to remember.  4. Create a [employer] password. You can change your password at any time.  5. Enter your Password Reset Question and Answer. This can be used at a later time if you forget your password.   6. Enter your e-mail address. This allows you to receive e-mail notifications when new information is available in [employer].  7. Click Sign Up. You can now view your health information.    For assistance activating your [employer] account, call [phone]

## 2017-04-15 NOTE — ED AVS SNAPSHOT
4/16/2017    Norm Prabhakar  [address] [facility] [address]    Dear Norm:    [facility] wants to ensure your discharge home is safe and you or your loved ones have had all of your questions answered regarding your care after you leave the hospital.    Below is a list of resources and contact information should you have any questions regarding your hospital stay, follow-up instructions, or active medical symptoms.    Questions or Concerns Regarding… Contact   Medical Questions Related to Your Discharge  (7 days a week, 8am-5pm) Contact a Nurse Care Coordinator   [phone]   Medical Questions Not Related to Your Discharge  (24 hours a day / 7 days a week)  Contact the Nurse Health Line   [phone]    Medications or Discharge Instructions Refer to your discharge packet   or contact your [facility] Primary Care Provider   [phone]   Follow-up Appointment(s) Schedule your appointment via [employer]   or contact Scheduling [phone]   Billing Review your statement via [employer]  or contact Billing [phone]   Medical Records Review your records via [employer]   or contact Medical Records [phone]     You may receive a telephone call within two days of discharge. This call is to make certain you understand your discharge instructions and have the opportunity to have any questions answered. You can also easily access your medical information, test results and upcoming appointments via the [employer] free online health management tool. You can learn more and sign up at [email or web address]. For assistance setting up your [employer] account, please call [phone].    Once again, we want to ensure your discharge home is safe and that you have a clear understanding of any next steps in your care. If you have any questions or concerns, please do not hesitate to contact us, we are here for you. Thank you for choosing [facility] for your healthcare needs.    Sincerely,    Your [facility] Healthcare Team

## 2017-04-15 NOTE — ED AVS SNAPSHOT
Home Care Instructions                                                                                                                Norm Prabhakar   MRN: [id number]    Department:  [facility], Emergency Dept   Date of Visit:  4/15/2017            [facility], Emergency Dept    [address]    [facility] [address]    Phone:  [phone]      You were seen by     Adonis Bustos M.D.      Your Diagnosis Was     Gastroenteritis     K52.9       These are the medications you received during your hospitalization from 04/15/2017 1732 to 04/16/2017 0035     Date/Time Order Dose Route Action    04/15/2017 2300 NS infusion 1,000 mL 1,000 mL Intravenous New Bag    04/15/2017 2303 ondansetron (ZOFRAN) syringe/vial injection 4 mg 4 mg Intravenous Given    04/15/2017 2303 dicyclomine (BENTYL) injection 20 mg 20 mg Intramuscular Given      Follow-up Information     1. Follow up with Long Linares M.D..    Specialty:  Family Medicine    Contact information    [facility]  [address]  [facility] [address]  [phone]        Medication Information     Review all of your home medications and newly ordered medications with your primary doctor and/or pharmacist as soon as possible. Follow medication instructions as directed by your doctor and/or pharmacist.     Please keep your complete medication list with you and share with your physician. Update the information when medications are discontinued, doses are changed, or new medications (including over-the-counter products) are added; and carry medication information at all times in the event of emergency situations.               Medication List      START taking these medications        Instructions    Morning Afternoon Evening Bedtime    dicyclomine 20 MG Tabs   Commonly known as:  BENTYL        Take 1 Tab by mouth every 6 hours.   Dose:  20 mg                        ondansetron 4 MG Tbdp   Commonly known as:  ZOFRAN ODT        Take 1 Tab  by mouth every 8 hours as needed for Nausea/Vomiting.   Dose:  4 mg                          ASK your doctor about these medications        Instructions    Morning Afternoon Evening Bedtime    divalproex  MG Tb24   Commonly known as:  DEPAKOTE ER        Take 3 Tabs by mouth every evening.   Dose:  1500 mg                        hydrocodone-acetaminophen 5-325 MG Tabs per tablet   Commonly known as:  NORCO        Take 1-2 Tabs by mouth every 6 hours as needed.   Dose:  1-2 Tab                        levothyroxine 125 MCG Tabs   Commonly known as:  SYNTHROID        Take 125 mcg by mouth Every morning on an empty stomach.   Dose:  125 mcg                        prazosin 2 MG Caps   Commonly known as:  MINIPRESS        Take 2 mg by mouth every evening.   Dose:  2 mg                        sertraline 100 MG Tabs   Commonly known as:  ZOLOFT        Take 100 mg by mouth every morning.   Dose:  100 mg                             Where to Get Your Medications      You can get these medications from any pharmacy     Bring a paper prescription for each of these medications    - dicyclomine 20 MG Tabs  - ondansetron 4 MG Tbdp            Procedures and tests performed during your visit     CARDIAC MONITORING    CBC WITH DIFFERENTIAL    COMP METABOLIC PANEL    EKG (NOW)    ESTIMATED GFR    IV Saline Lock    LIPASE    O2 Protocol    SALINE LOCK    URINALYSIS,CULTURE IF INDICATED        Discharge Instructions       Viral Gastroenteritis  Viral gastroenteritis is also known as stomach flu. This condition affects the stomach and intestinal tract. It can cause sudden diarrhea and vomiting. The illness typically lasts 3 to 8 days. Most people develop an immune response that eventually gets rid of the virus. While this natural response develops, the virus can make you quite ill.  CAUSES   Many different viruses can cause gastroenteritis, such as rotavirus or noroviruses. You can catch one of these viruses by consuming contaminated  food or water. You may also catch a virus by sharing utensils or other personal items with an infected person or by touching a contaminated surface.  SYMPTOMS   The most common symptoms are diarrhea and vomiting. These problems can cause a severe loss of body fluids (dehydration) and a body salt (electrolyte) imbalance. Other symptoms may include:  · Fever.  · Headache.  · Fatigue.  · Abdominal pain.  DIAGNOSIS   Your caregiver can usually diagnose viral gastroenteritis based on your symptoms and a physical exam. A stool sample may also be taken to test for the presence of viruses or other infections.  TREATMENT   This illness typically goes away on its own. Treatments are aimed at rehydration. The most serious cases of viral gastroenteritis involve vomiting so severely that you are not able to keep fluids down. In these cases, fluids must be given through an intravenous line (IV).  HOME CARE INSTRUCTIONS   · Drink enough fluids to keep your urine clear or pale yellow. Drink small amounts of fluids frequently and increase the amounts as tolerated.  · Ask your caregiver for specific rehydration instructions.  · Avoid:  ¨ Foods high in sugar.  ¨ Alcohol.  ¨ Carbonated drinks.  ¨ Tobacco.  ¨ Juice.  ¨ Caffeine drinks.  ¨ Extremely hot or cold fluids.  ¨ Fatty, greasy foods.  ¨ Too much intake of anything at one time.  ¨ Dairy products until 24 to 48 hours after diarrhea stops.  · You may consume probiotics. Probiotics are active cultures of beneficial bacteria. They may lessen the amount and number of diarrheal stools in adults. Probiotics can be found in yogurt with active cultures and in supplements.  · Wash your hands well to avoid spreading the virus.  · Only take over-the-counter or prescription medicines for pain, discomfort, or fever as directed by your caregiver. Do not give aspirin to children. Antidiarrheal medicines are not recommended.  · Ask your caregiver if you should continue to take your regular  prescribed and over-the-counter medicines.  · Keep all follow-up appointments as directed by your caregiver.  SEEK IMMEDIATE MEDICAL CARE IF:   · You are unable to keep fluids down.  · You do not urinate at least once every 6 to 8 hours.  · You develop shortness of breath.  · You notice blood in your stool or vomit. This may look like coffee grounds.  · You have abdominal pain that increases or is concentrated in one small area (localized).  · You have persistent vomiting or diarrhea.  · You have a fever.  · The patient is a child younger than 3 months, and he or she has a fever.  · The patient is a child older than 3 months, and he or she has a fever and persistent symptoms.  · The patient is a child older than 3 months, and he or she has a fever and symptoms suddenly get worse.  · The patient is a baby, and he or she has no tears when crying.  MAKE SURE YOU:   · Understand these instructions.  · Will watch your condition.  · Will get help right away if you are not doing well or get worse.     This information is not intended to replace advice given to you by your health care provider. Make sure you discuss any questions you have with your health care provider.     Document Released: 12/18/2006 Document Revised: 03/11/2013 Document Reviewed: 10/03/2012  [employer] Interactive Patient Education ©2016 [employer]            Patient Information     Patient Information    Following emergency treatment: all patient requiring follow-up care must return either to a private physician or a clinic if your condition worsens before you are able to obtain further medical attention, please return to the emergency room.     Billing Information    At [facility], we work to make the billing process streamlined for our patients.  Our Representatives are here to answer any questions you may have regarding your hospital bill.  If you have insurance coverage and have supplied your insurance information to us, we will submit a claim  to your insurer on your behalf.  Should you have any questions regarding your bill, we can be reached online or by phone as follows:  Online: You are able pay your bills online or live chat with our representatives about any billing questions you may have. We are here to help Monday - Friday from 8:00am to 7:30pm and 9:00am - 12:00pm on Saturdays.  Please visit [email or web address]  for more information.   Phone:  [phone] or [phone]    Please note that your emergency physician, surgeon, pathologist, radiologist, anesthesiologist, and other specialists are not employed by [facility] and will therefore bill separately for their services.  Please contact them directly for any questions concerning their bills at the numbers below:     Emergency Physician Services:  [phone]  [facility]:  [phone]  [facility]:  [phone]  [facility]:  [phone]    1. Your final bill may vary from the amount quoted upon discharge if all procedures are not complete at that time, or if your doctor has additional procedures of which we are not aware. You will receive an additional bill if you return to the Emergency Department at [facility] for suture removal regardless of the facility of which the sutures were placed.     2. Please arrange for settlement of this account at the emergency registration.    3. All self-pay accounts are due in full at the time of treatment.  If you are unable to meet this obligation then payment is expected within 4-5 days.     4. If you have had radiology studies (CT, X-ray, Ultrasound, MRI), you have received a preliminary result during your emergency department visit. Please contact the radiology department [phone] to receive a copy of your final result. Please discuss the Final result with your primary physician or with the follow up physician provided.     Crisis Hotline:  National Crisis  Hotline:  1-500-XIBMWGR or 1-526.145.9087  [place] Crisis Hotline:    1-526.604.1919 or 194-217-3798         ED Discharge Follow Up Questions    1. In order to provide you with very good care, we would like to follow up with a phone call in the next few days.  May we have your permission to contact you?     YES /  NO    2. What is the best phone number to call you? (       )_____-__________    3. What is the best time to call you?      Morning  /  Afternoon  /  Evening                   Patient Signature:  ____________________________________________________________    Date:  ____________________________________________________________

## 2017-04-16 VITALS
SYSTOLIC BLOOD PRESSURE: 116 MMHG | RESPIRATION RATE: 18 BRPM | DIASTOLIC BLOOD PRESSURE: 67 MMHG | HEART RATE: 60 BPM | TEMPERATURE: 96.7 F | WEIGHT: 307.1 LBS | BODY MASS INDEX: 35.53 KG/M2 | OXYGEN SATURATION: 94 % | HEIGHT: 78 IN

## 2017-04-16 RX ORDER — DICYCLOMINE HCL 20 MG
20 TABLET ORAL EVERY 6 HOURS
Qty: 20 TAB | Refills: 0 | Status: SHIPPED | OUTPATIENT
Start: 2017-04-16 | End: 2017-05-28

## 2017-04-16 RX ORDER — ONDANSETRON 4 MG/1
4 TABLET, ORALLY DISINTEGRATING ORAL EVERY 8 HOURS PRN
Qty: 8 TAB | Refills: 0 | Status: SHIPPED | OUTPATIENT
Start: 2017-04-16 | End: 2017-05-28

## 2017-04-16 ASSESSMENT — PAIN SCALES - GENERAL: PAINLEVEL_OUTOF10: 3

## 2017-04-16 NOTE — ED NOTES
Pt ambulates back to triage with a 3 ft kenan pickett from 2007 that he was having repaired  Blood samples collected and sent to lab  Urine sample requested from pt

## 2017-04-16 NOTE — ED NOTES
Pt medicated per ERPs orders. Pt resting comfortably on gurney. Pt with no changes from previous assessments. Respirations are even and unlabored. Bed in lowest position, call light within reach. Pt with no further needs at this time.

## 2017-04-16 NOTE — ED NOTES
Discharge instructions provided with Rx x 2.  Verbalized understanding of follow-up care, medication management & reasons to return to ED.  Released in stable condition.

## 2017-04-16 NOTE — ED NOTES
Pt sleeping on gurney. Pt with no changes from previous assessments. Respirations are even and unlabored. Bed in lowest position, call light within reach. Pt with no further needs at this time.

## 2017-04-16 NOTE — DISCHARGE INSTRUCTIONS
Viral Gastroenteritis  Viral gastroenteritis is also known as stomach flu. This condition affects the stomach and intestinal tract. It can cause sudden diarrhea and vomiting. The illness typically lasts 3 to 8 days. Most people develop an immune response that eventually gets rid of the virus. While this natural response develops, the virus can make you quite ill.  CAUSES   Many different viruses can cause gastroenteritis, such as rotavirus or noroviruses. You can catch one of these viruses by consuming contaminated food or water. You may also catch a virus by sharing utensils or other personal items with an infected person or by touching a contaminated surface.  SYMPTOMS   The most common symptoms are diarrhea and vomiting. These problems can cause a severe loss of body fluids (dehydration) and a body salt (electrolyte) imbalance. Other symptoms may include:  · Fever.  · Headache.  · Fatigue.  · Abdominal pain.  DIAGNOSIS   Your caregiver can usually diagnose viral gastroenteritis based on your symptoms and a physical exam. A stool sample may also be taken to test for the presence of viruses or other infections.  TREATMENT   This illness typically goes away on its own. Treatments are aimed at rehydration. The most serious cases of viral gastroenteritis involve vomiting so severely that you are not able to keep fluids down. In these cases, fluids must be given through an intravenous line (IV).  HOME CARE INSTRUCTIONS   · Drink enough fluids to keep your urine clear or pale yellow. Drink small amounts of fluids frequently and increase the amounts as tolerated.  · Ask your caregiver for specific rehydration instructions.  · Avoid:  ¨ Foods high in sugar.  ¨ Alcohol.  ¨ Carbonated drinks.  ¨ Tobacco.  ¨ Juice.  ¨ Caffeine drinks.  ¨ Extremely hot or cold fluids.  ¨ Fatty, greasy foods.  ¨ Too much intake of anything at one time.  ¨ Dairy products until 24 to 48 hours after diarrhea stops.  · You may consume probiotics.  Probiotics are active cultures of beneficial bacteria. They may lessen the amount and number of diarrheal stools in adults. Probiotics can be found in yogurt with active cultures and in supplements.  · Wash your hands well to avoid spreading the virus.  · Only take over-the-counter or prescription medicines for pain, discomfort, or fever as directed by your caregiver. Do not give aspirin to children. Antidiarrheal medicines are not recommended.  · Ask your caregiver if you should continue to take your regular prescribed and over-the-counter medicines.  · Keep all follow-up appointments as directed by your caregiver.  SEEK IMMEDIATE MEDICAL CARE IF:   · You are unable to keep fluids down.  · You do not urinate at least once every 6 to 8 hours.  · You develop shortness of breath.  · You notice blood in your stool or vomit. This may look like coffee grounds.  · You have abdominal pain that increases or is concentrated in one small area (localized).  · You have persistent vomiting or diarrhea.  · You have a fever.  · The patient is a child younger than 3 months, and he or she has a fever.  · The patient is a child older than 3 months, and he or she has a fever and persistent symptoms.  · The patient is a child older than 3 months, and he or she has a fever and symptoms suddenly get worse.  · The patient is a baby, and he or she has no tears when crying.  MAKE SURE YOU:   · Understand these instructions.  · Will watch your condition.  · Will get help right away if you are not doing well or get worse.     This information is not intended to replace advice given to you by your health care provider. Make sure you discuss any questions you have with your health care provider.     Document Released: 12/18/2006 Document Revised: 03/11/2013 Document Reviewed: 10/03/2012  [employer] Interactive Patient Education ©2016 [employer]

## 2017-05-28 ENCOUNTER — HOSPITAL ENCOUNTER (EMERGENCY)
Facility: MEDICAL CENTER | Age: 37
End: 2017-05-28
Attending: EMERGENCY MEDICINE
Payer: MEDICAID

## 2017-05-28 VITALS
WEIGHT: 309.53 LBS | DIASTOLIC BLOOD PRESSURE: 67 MMHG | HEIGHT: 78 IN | OXYGEN SATURATION: 93 % | TEMPERATURE: 97.1 F | SYSTOLIC BLOOD PRESSURE: 115 MMHG | HEART RATE: 65 BPM | RESPIRATION RATE: 18 BRPM | BODY MASS INDEX: 35.81 KG/M2

## 2017-05-28 DIAGNOSIS — G43.009 MIGRAINE WITHOUT AURA AND WITHOUT STATUS MIGRAINOSUS, NOT INTRACTABLE: ICD-10-CM

## 2017-05-28 DIAGNOSIS — G40.909 SEIZURE DISORDER (HCC): ICD-10-CM

## 2017-05-28 DIAGNOSIS — F31.9 BIPOLAR AFFECTIVE DISORDER, REMISSION STATUS UNSPECIFIED (HCC): ICD-10-CM

## 2017-05-28 LAB
ALBUMIN SERPL BCP-MCNC: 4.4 G/DL (ref 3.2–4.9)
ALBUMIN/GLOB SERPL: 1.8 G/DL
ALP SERPL-CCNC: 59 U/L (ref 30–99)
ALT SERPL-CCNC: 22 U/L (ref 2–50)
ANION GAP SERPL CALC-SCNC: 10 MMOL/L (ref 0–11.9)
AST SERPL-CCNC: 26 U/L (ref 12–45)
BASOPHILS # BLD AUTO: 1.2 % (ref 0–1.8)
BASOPHILS # BLD: 0.14 K/UL (ref 0–0.12)
BILIRUB SERPL-MCNC: 0.4 MG/DL (ref 0.1–1.5)
BUN SERPL-MCNC: 16 MG/DL (ref 8–22)
CALCIUM SERPL-MCNC: 9 MG/DL (ref 8.5–10.5)
CHLORIDE SERPL-SCNC: 103 MMOL/L (ref 96–112)
CO2 SERPL-SCNC: 25 MMOL/L (ref 20–33)
CREAT SERPL-MCNC: 0.96 MG/DL (ref 0.5–1.4)
EOSINOPHIL # BLD AUTO: 0.17 K/UL (ref 0–0.51)
EOSINOPHIL NFR BLD: 1.5 % (ref 0–6.9)
ERYTHROCYTE [DISTWIDTH] IN BLOOD BY AUTOMATED COUNT: 42.4 FL (ref 35.9–50)
GFR SERPL CREATININE-BSD FRML MDRD: >60 ML/MIN/1.73 M 2
GLOBULIN SER CALC-MCNC: 2.5 G/DL (ref 1.9–3.5)
GLUCOSE SERPL-MCNC: 97 MG/DL (ref 65–99)
HCT VFR BLD AUTO: 38.9 % (ref 42–52)
HGB BLD-MCNC: 13.2 G/DL (ref 14–18)
IMM GRANULOCYTES # BLD AUTO: 0.07 K/UL (ref 0–0.11)
IMM GRANULOCYTES NFR BLD AUTO: 0.6 % (ref 0–0.9)
LYMPHOCYTES # BLD AUTO: 3.81 K/UL (ref 1–4.8)
LYMPHOCYTES NFR BLD: 32.6 % (ref 22–41)
MCH RBC QN AUTO: 30.5 PG (ref 27–33)
MCHC RBC AUTO-ENTMCNC: 33.9 G/DL (ref 33.7–35.3)
MCV RBC AUTO: 89.8 FL (ref 81.4–97.8)
MONOCYTES # BLD AUTO: 1.05 K/UL (ref 0–0.85)
MONOCYTES NFR BLD AUTO: 9 % (ref 0–13.4)
NEUTROPHILS # BLD AUTO: 6.44 K/UL (ref 1.82–7.42)
NEUTROPHILS NFR BLD: 55.1 % (ref 44–72)
NRBC # BLD AUTO: 0 K/UL
NRBC BLD AUTO-RTO: 0 /100 WBC
PLATELET # BLD AUTO: 341 K/UL (ref 164–446)
PMV BLD AUTO: 10.3 FL (ref 9–12.9)
POTASSIUM SERPL-SCNC: 3.5 MMOL/L (ref 3.6–5.5)
PROT SERPL-MCNC: 6.9 G/DL (ref 6–8.2)
RBC # BLD AUTO: 4.33 M/UL (ref 4.7–6.1)
SODIUM SERPL-SCNC: 138 MMOL/L (ref 135–145)
VALPROATE SERPL-MCNC: 1.4 UG/ML (ref 50–100)
WBC # BLD AUTO: 11.7 K/UL (ref 4.8–10.8)

## 2017-05-28 PROCEDURE — 36415 COLL VENOUS BLD VENIPUNCTURE: CPT

## 2017-05-28 PROCEDURE — 96375 TX/PRO/DX INJ NEW DRUG ADDON: CPT

## 2017-05-28 PROCEDURE — 700111 HCHG RX REV CODE 636 W/ 250 OVERRIDE (IP): Performed by: EMERGENCY MEDICINE

## 2017-05-28 PROCEDURE — A9270 NON-COVERED ITEM OR SERVICE: HCPCS | Performed by: EMERGENCY MEDICINE

## 2017-05-28 PROCEDURE — 80164 ASSAY DIPROPYLACETIC ACD TOT: CPT

## 2017-05-28 PROCEDURE — 700102 HCHG RX REV CODE 250 W/ 637 OVERRIDE(OP): Performed by: EMERGENCY MEDICINE

## 2017-05-28 PROCEDURE — 80053 COMPREHEN METABOLIC PANEL: CPT

## 2017-05-28 PROCEDURE — 99284 EMERGENCY DEPT VISIT MOD MDM: CPT

## 2017-05-28 PROCEDURE — 85025 COMPLETE CBC W/AUTO DIFF WBC: CPT

## 2017-05-28 PROCEDURE — 96374 THER/PROPH/DIAG INJ IV PUSH: CPT

## 2017-05-28 RX ORDER — DIVALPROEX SODIUM 500 MG/1
1000 TABLET, DELAYED RELEASE ORAL ONCE
Status: COMPLETED | OUTPATIENT
Start: 2017-05-28 | End: 2017-05-28

## 2017-05-28 RX ORDER — DEXAMETHASONE 4 MG/1
8 TABLET ORAL ONCE
Status: COMPLETED | OUTPATIENT
Start: 2017-05-28 | End: 2017-05-28

## 2017-05-28 RX ORDER — KETOROLAC TROMETHAMINE 30 MG/ML
15 INJECTION, SOLUTION INTRAMUSCULAR; INTRAVENOUS ONCE
Status: COMPLETED | OUTPATIENT
Start: 2017-05-28 | End: 2017-05-28

## 2017-05-28 RX ORDER — DIPHENHYDRAMINE HYDROCHLORIDE 50 MG/ML
25 INJECTION INTRAMUSCULAR; INTRAVENOUS ONCE
Status: COMPLETED | OUTPATIENT
Start: 2017-05-28 | End: 2017-05-28

## 2017-05-28 RX ORDER — LATANOPROST 50 UG/ML
1 SOLUTION/ DROPS OPHTHALMIC NIGHTLY
COMMUNITY
End: 2018-07-27

## 2017-05-28 RX ORDER — BUTALBITAL, ACETAMINOPHEN AND CAFFEINE 50; 325; 40 MG/1; MG/1; MG/1
1 TABLET ORAL ONCE
Status: COMPLETED | OUTPATIENT
Start: 2017-05-28 | End: 2017-05-28

## 2017-05-28 RX ADMIN — DIVALPROEX SODIUM 1000 MG: 500 TABLET, DELAYED RELEASE ORAL at 03:32

## 2017-05-28 RX ADMIN — DIPHENHYDRAMINE HYDROCHLORIDE 25 MG: 50 INJECTION, SOLUTION INTRAMUSCULAR; INTRAVENOUS at 02:22

## 2017-05-28 RX ADMIN — DEXAMETHASONE 8 MG: 4 TABLET ORAL at 03:32

## 2017-05-28 RX ADMIN — KETOROLAC TROMETHAMINE 15 MG: 30 INJECTION, SOLUTION INTRAMUSCULAR at 02:23

## 2017-05-28 RX ADMIN — PROCHLORPERAZINE EDISYLATE 10 MG: 5 INJECTION INTRAMUSCULAR; INTRAVENOUS at 02:22

## 2017-05-28 RX ADMIN — BUTALBITAL, ACETAMINOPHEN, AND CAFFEINE 1 TABLET: 50; 325; 40 TABLET ORAL at 03:32

## 2017-05-28 ASSESSMENT — PAIN SCALES - GENERAL: PAINLEVEL_OUTOF10: 10

## 2017-05-28 NOTE — ED NOTES
Assist RN note: Pt ambulatory to room 23. Agree with triage note. Pt has no oral trauma, reports 2 episodes of incontinence. PIV established, blood drawn, sent to lab. VSS.

## 2017-05-28 NOTE — ED AVS SNAPSHOT
5/28/2017    Norm Prabhakar  [address] [address]    Dear Norm:    [facility] wants to ensure your discharge home is safe and you or your loved ones have had all of your questions answered regarding your care after you leave the hospital.    Below is a list of resources and contact information should you have any questions regarding your hospital stay, follow-up instructions, or active medical symptoms.    Questions or Concerns Regarding… Contact   Medical Questions Related to Your Discharge  (7 days a week, 8am-5pm) Contact a Nurse Care Coordinator   [phone]   Medical Questions Not Related to Your Discharge  (24 hours a day / 7 days a week)  Contact the Nurse Health Line   [phone]    Medications or Discharge Instructions Refer to your discharge packet   or contact your [facility] Primary Care Provider   [phone]   Follow-up Appointment(s) Schedule your appointment via [employer]   or contact Scheduling [phone]   Billing Review your statement via [employer]  or contact Billing [phone]   Medical Records Review your records via [employer]   or contact Medical Records [phone]     You may receive a telephone call within two days of discharge. This call is to make certain you understand your discharge instructions and have the opportunity to have any questions answered. You can also easily access your medical information, test results and upcoming appointments via the [employer] free online health management tool. You can learn more and sign up at [employer]. For assistance setting up your [employer] account, please call [phone].    Once again, we want to ensure your discharge home is safe and that you have a clear understanding of any next steps in your care. If you have any questions or concerns, please do not hesitate to contact us, we are here for you. Thank you for choosing [facility] for your healthcare needs.    Sincerely,    Your [facility] Healthcare Team

## 2017-05-28 NOTE — ED AVS SNAPSHOT
[employer] Access Code: [email or web address]  Expires: 6/27/2017  3:53 AM    [employer]  A secure, online tool to manage your health information     [employer]® is a secure, online tool that connects you to your personalized health information from the privacy of your home -- day or night - making it very easy for you to manage your healthcare. Once the activation process is completed, you can even access your medical information using the [employer] jodi, which is available for free in the Apple Jodi store or Google Play store.     [employer] provides the following levels of access (as shown below):   My Chart Features   [facility] Primary Care Doctor [facility]  Specialists [facility]  Urgent  Care Non-[facility]  Primary Care  Doctor   Email your healthcare team securely and privately 24/7 X X X X   Manage appointments: schedule your next appointment; view details of past/upcoming appointments X      Request prescription refills. X      View recent personal medical records, including lab and immunizations X X X X   View health record, including health history, allergies, medications X X X X   Read reports about your outpatient visits, procedures, consult and ER notes X X X X   See your discharge summary, which is a recap of your hospital and/or ER visit that includes your diagnosis, lab results, and care plan. X X       How to register for [employer]:  1. Go to  [email or web address].  2. Click on the Sign Up Now box, which takes you to the New Member Sign Up page. You will need to provide the following information:  a. Enter your [employer] Access Code exactly as it appears at the top of this page. (You will not need to use this code after you’ve completed the sign-up process. If you do not sign up before the expiration date, you must request a new code.)   b. Enter your date of birth.   c. Enter your home email address.   d. Click Submit, and follow the next screen’s instructions.  3. Create a [employer] ID. This will be your [employer]  login ID and cannot be changed, so think of one that is secure and easy to remember.  4. Create a [employer] password. You can change your password at any time.  5. Enter your Password Reset Question and Answer. This can be used at a later time if you forget your password.   6. Enter your e-mail address. This allows you to receive e-mail notifications when new information is available in [employer].  7. Click Sign Up. You can now view your health information.    For assistance activating your [employer] account, call [phone]

## 2017-05-28 NOTE — ED NOTES
".  Chief Complaint   Patient presents with   • Headache     For past six days, forehead area feels \"like i've been hit with a sledgehammer\"   • N/V   • Seizure     Hx of epilepsy states that headache is causing him to have increased seizures, two today     Patient presents with above symptoms.  Last took Excedrin at 0800 this AM with no relief.      Hx of epilepsy, glaucoma with surgery to left eye/drainage valve placed.  Left eye normally bigger than right eye.      Denies vision changes.  One incident of vomiting while at [employer] game earlier tonight.      Patient placed in lobby.  Explained triage process and wait times. Encouraged to notify staff for any changing or worsening symptoms.   "

## 2017-05-28 NOTE — ED PROVIDER NOTES
"ED Provider Note    Scribed for Blank Kirkland M.D. by Sudhakar Lester. 5/28/2017, 1:42 AM.    Primary care provider: Long Linares M.D.  Means of arrival: Walk In  History obtained from: Patient  History limited by: None     CHIEF COMPLAINT  Chief Complaint   Patient presents with   • Headache     For past six days, forehead area feels \"like i've been hit with a sledgehammer\"   • N/V   • Seizure     Hx of epilepsy states that headache is causing him to have increased seizures, two today     [facility]  Norm Prabhakar is a 36 y.o. male who presents to the Emergency Department due to seizure.  Patient experienced two seizures yesterday. Both of his seizures were witnessed by a friend. The patient has a history of epilepsy, for which he takes Depakote. He denies missing any doses of his Depakote medication. The patient had a gradual onset of headache 6 days ago. His headache has been constant, he describes his headache as \"pressure\". Patient has medicated his headache with \"every headache medication\" without relief. Patient had symptoms of nausea and vomiting associated with his headache today. He experienced one episode of normal appearing emesis while at [employer]. He does endorse a history of migraine headaches, states his symptoms are identical to previous migraine headaches. Headaches usually resolve prior to 6 days.    Patient denies any chest pain, shortness of breath, neck pain, back pain, abdominal pain, fever, chills.     REVIEW OF SYSTEMS  See HPI for further details. All other systems reviewed and negative.    C.     PAST MEDICAL HISTORY   has a past medical history of ASTHMA; Glaucoma; Hypothyroidism; Depression; Anxiety; Seizure disorder (CMS-HCC); Bipolar 1 disorder (CMS-HCC); S/P thyroidectomy; Murmur; Fall; Glaucoma (1982); Psychiatric problem (2002); Mental disorder; Seizure (CMS-HCC) (2010); Pneumonia; Indigestion; and Unspecified disorder of thyroid.    SURGICAL HISTORY   has past surgical history " "that includes eye surgery; thyroid lobectomy; and other.    SOCIAL HISTORY  Social History   Substance Use Topics   • Smoking status: Current Every Day Smoker -- 0.25 packs/day for 4 years     Types: Cigarettes     Last Attempt to Quit: 01/01/2014   • Smokeless tobacco: Never Used   • Alcohol Use: No      History   Drug Use No     FAMILY HISTORY  Family History   Problem Relation Age of Onset   • Hypertension Mother    • Heart Disease Mother    • Lung Disease Mother    • Stroke Maternal Grandmother      CURRENT MEDICATIONS  Home Medications     Reviewed by Lulu Lundberg R.N. (Registered Nurse) on 05/28/17 at 0140  Med List Status: Complete    Medication Last Dose Status    ALBUTEROL INH 5/27/2017 Active    divalproex ER (DEPAKOTE ER) 500 MG TABLET SR 24 HR 5/27/2017 Active    latanoprost (XALATAN) 0.005 % Solution 5/27/2017 Active    levothyroxine (SYNTHROID) 125 MCG TABS 5/27/2017 Active    prazosin (MINIPRESS) 2 MG Cap 5/27/2017 Active    sertraline (ZOLOFT) 100 MG TABS 5/27/2017 Active              ALLERGIES  Allergies   Allergen Reactions   • Abilify Unspecified     \"Feeling tired, like I don't even know whats going on around me\"   • Fish      PHYSICAL EXAM  Vital Signs: /67 mmHg  Pulse 70  Temp(Src) 36.2 °C (97.1 °F)  Resp 18  Ht 1.981 m (6' 6\")  Wt 140.4 kg (309 lb 8.4 oz)  BMI 35.78 kg/m2  SpO2 99%  Constitutional: Alert, no acute distress  HENT: Normocephalic, atraumatic, moist mucus membranes  Eyes: Left pupil non reactive consistent with chronic history of glaucoma. Right Pupil reactive, normal conjunctiva, non-icteric   Neck: Supple, normal range of motion, no stridor  Cardiovascular: Regular rhythm, Normal peripheral perfusion, no cyanosis, Normal cardiac auscultation  Pulmonary: No respiratory distress, normal work of breathing, no accessory muscle usage, Clear to auscultation bilaterally  Abdomen: Soft, non tender, no peritoneal signs, bowel sounds are present.   Skin: Warm, dry, no " rashes or lesions  Back: No pain with active range of motion  Musculoskeletal: Normal range of motion in all extremities, no swelling or deformity noted  Neurologic: CN II-XII intact, speech normal, muscle strength 5/5 in all four extremities, normal  strength bilaterally, sensation grossly intact, normal finger-to-nose, no pronator drift  Psychiatric: Normal and appropriate mood and affect    DIAGNOSTIC STUDIES/PROCEDURES:    LABS  Labs Reviewed   CBC WITH DIFFERENTIAL - Abnormal; Notable for the following:     WBC 11.7 (*)     RBC 4.33 (*)     Hemoglobin 13.2 (*)     Hematocrit 38.9 (*)     Monos (Absolute) 1.05 (*)     Baso (Absolute) 0.14 (*)     All other components within normal limits   COMP METABOLIC PANEL - Abnormal; Notable for the following:     Potassium 3.5 (*)     All other components within normal limits   VALPROIC ACID - Abnormal; Notable for the following:     Valproic Acid 1.4 (*)     All other components within normal limits   ESTIMATED GFR   POCT URINALYSIS     All labs reviewed by me.    COURSE & MEDICAL DECISION MAKING  Pertinent Labs & Imaging studies reviewed. (See chart for details)    Review of old medical records for continuity of care. Patient treated previously with 8 mg Decadron PO and Fioricet 5-325-40 mg PO for his headache.      Differential diagnoses include but are not limited to: Electrolyte abnormality, underlying occult infection, medication noncompliance, migraine headache    1:42 AM - Patient seen and examined at bedside. Patient will be treated with Depakote 1000 mg PO, Decadron  Mg PO, Fioricet -40 mg PO, Benadryl 25 mg IV, Toradol 15 mg IV, Compazine 10 mg IV. Ordered valproic acid, CBC, CMP, urinalysis, GFR to evaluate his symptoms.     3:50 AM Recheck: Patient re-evaluated at beside. Patient feeling much better, symptoms have resolved, he is resting comfortably. Patient was updated of his lab results. Patient provided with discharge instructions, and understands  to follow up with PCP in two days.     Decision Making:  This is a 36 y.o. year old male who presents with reported increasing frequency of seizures, history of seizure disorder. Additionally reports persistent migraine headache without alleviation. Headache is gradual onset, he has had headaches of this severity in the past, doubt subarachnoid. No fevers, no meningeal signs, no neck pain, doubt meningitis. On laboratory evaluation is very mildly elevated white blood count, no immature granulocytes, no bands, no left shift, suspect this is likely from his recent seizures. No evidence of infectious etiology. Valproic acid level is low, this was given in the emergency department. No electrolyte abnormality to explain seizures.    Migraine headache initially treated, Benadryl and Toradol. On reassessment patient that his headache had improved but had not resolved. States that when he was previously treated for headache that medication regimen worked well for him. Medical record review showed that this was Fioricet and Decadron by mouth. These medications were given with resolution of headache    He remains entirely neurologically intact. Plan is for discharge home. Return precautions given including recurrent seizures, fevers, vomiting, recurrent headaches, or any new or worsening symptoms.    The patient is referred to a primary physician for blood pressure management, diabetic screening, and for all other preventative health concerns.    DISPOSITION:  Patient will be discharged home in stable condition.    FOLLOW UP:  Long Linares M.D.  [address]  [address]  [place] [address] [phone]    Go in 2 days  For complete recheck    [facility], Emergency Dept  [address] [address] [phone]  Go to  If symptoms worsen or do not continue to improve       FINAL IMPRESSION  1. Migraine without aura and without status migrainosus, not intractable    2. Seizure disorder  (CMS-[facility])    3. Bipolar affective disorder, remission status unspecified (CMS-[facility])          Sudhakar ROB (Scribe), am scribing for, and in the presence of, Blank Kirkland M.D..    Electronically signed by: Sudhakar Lester (Scribe), 5/28/2017    Blank ROB M.D. personally performed the services described in this documentation, as scribed by Sudhakar Lester in my presence, and it is both accurate and complete.    The note accurately reflects work and decisions made by me.  Blank Kirkland  5/28/2017  5:19 AM    ;s

## 2017-05-28 NOTE — DISCHARGE INSTRUCTIONS
Please call your primary care physician Tuesday morning to let him know you were seen in the emergency department. Return to the emergency department if your symptoms recur including worsening headache, uncontrolled seizures, was given a new or worsening symptoms.      Epilepsy  Epilepsy is a disorder in which a person has repeated seizures over time. A seizure is a release of abnormal electrical activity in the brain. Seizures can cause a change in attention, behavior, or the ability to remain awake and alert (altered mental status). Seizures often involve uncontrollable shaking (convulsions).   Most people with epilepsy lead normal lives. However, people with epilepsy are at an increased risk of falls, accidents, and injuries. Therefore, it is important to begin treatment right away.  CAUSES   Epilepsy has many possible causes. Anything that disturbs the normal pattern of brain cell activity can lead to seizures. This may include:   · Head injury.  · Birth trauma.  · High fever as a child.  · Stroke.  · Bleeding into or around the brain.  · Certain drugs.  · Prolonged low oxygen, such as what occurs after CPR efforts.  · Abnormal brain development.  · Certain illnesses, such as meningitis, encephalitis (brain infection), malaria, and other infections.  · An imbalance of nerve signaling chemicals (neurotransmitters).    SIGNS AND SYMPTOMS   The symptoms of a seizure can vary greatly from one person to another. Right before a seizure, you may have a warning (aura) that a seizure is about to occur. An aura may include the following symptoms:  · Fear or anxiety.  · Nausea.  · Feeling like the room is spinning (vertigo).  · Vision changes, such as seeing flashing lights or spots.  Common symptoms during a seizure include:  · Abnormal sensations, such as an abnormal smell or a bitter taste in the mouth.    · Sudden, general body stiffness.    · Convulsions that involve rhythmic jerking of the face, arm, or leg on one or  both sides.    · Sudden change in consciousness.    ¨ Appearing to be awake but not responding.    ¨ Appearing to be asleep but cannot be awakened.    · Grimacing, chewing, lip smacking, drooling, tongue biting, or loss of bowel or bladder control.  After a seizure, you may feel sleepy for a while.   DIAGNOSIS   Your health care provider will ask about your symptoms and take a medical history. Descriptions from any witnesses to your seizures will be very helpful in the diagnosis. A physical exam, including a detailed neurological exam, is necessary. Various tests may be done, such as:   · An electroencephalogram (EEG). This is a painless test of your brain waves. In this test, a diagram is created of your brain waves. These diagrams can be interpreted by a specialist.  · An MRI of the brain.    · A CT scan of the brain.    · A spinal tap (lumbar puncture, LP).  · Blood tests to check for signs of infection or abnormal blood chemistry.  TREATMENT   There is no cure for epilepsy, but it is generally treatable. Once epilepsy is diagnosed, it is important to begin treatment as soon as possible. For most people with epilepsy, seizures can be controlled with medicines. The following may also be used:  · A pacemaker for the brain (vagus nerve stimulator) can be used for people with seizures that are not well controlled by medicine.  · Surgery on the brain.  For some people, epilepsy eventually goes away.  HOME CARE INSTRUCTIONS   · Follow your health care provider's recommendations on driving and safety in normal activities.  · Get enough rest. Lack of sleep can cause seizures.  · Only take over-the-counter or prescription medicines as directed by your health care provider. Take any prescribed medicine exactly as directed.  · Avoid any known triggers of your seizures.  · Keep a seizure diary. Record what you recall about any seizure, especially any possible trigger.    · Make sure the people you live and work with know  that you are prone to seizures. They should receive instructions on how to help you. In general, a witness to a seizure should:    ¨ Cushion your head and body.    ¨ Turn you on your side.    ¨ Avoid unnecessarily restraining you.    ¨ Not place anything inside your mouth.    ¨ Call for emergency medical help if there is any question about what has occurred.    · Follow up with your health care provider as directed. You may need regular blood tests to monitor the levels of your medicine.    SEEK MEDICAL CARE IF:   · You develop signs of infection or other illness. This might increase the risk of a seizure.    · You seem to be having more frequent seizures.    · Your seizure pattern is changing.    SEEK IMMEDIATE MEDICAL CARE IF:   · You have a seizure that does not stop after a few moments.    · You have a seizure that causes any difficulty in breathing.    · You have a seizure that results in a very severe headache.    · You have a seizure that leaves you with the inability to speak or use a part of your body.       This information is not intended to replace advice given to you by your health care provider. Make sure you discuss any questions you have with your health care provider.     Document Released: 12/18/2006 Document Revised: 10/08/2014 Document Reviewed: 07/30/2014  [employer] Interactive Patient Education ©2016 [employer]

## 2017-05-28 NOTE — ED AVS SNAPSHOT
Home Care Instructions                                                                                                                Norm Prabhakar   MRN: [id number]    Department:  [facility], Emergency Dept   Date of Visit:  5/28/2017            [facility], Emergency Dept    [address] [place] [address]    Phone:  [phone]      You were seen by     Blank Kirkland M.D.      Your Diagnosis Was     Migraine without aura and without status migrainosus, not intractable     G43.009       These are the medications you received during your hospitalization from 05/28/2017 0008 to 05/28/2017 0353     Date/Time Order Dose Route Action    05/28/2017 0222 diphenhydrAMINE (BENADRYL) injection 25 mg 25 mg Intravenous Given    05/28/2017 0223 ketorolac (TORADOL) injection 15 mg 15 mg Intravenous Given    05/28/2017 0222 prochlorperazine (COMPAZINE) injection 10 mg 10 mg Intravenous Given    05/28/2017 0332 divalproex (DEPAKOTE) delayed-release tablet 1,000 mg 1,000 mg Oral Given    05/28/2017 0332 acetaminophen/caffeine/butalbital 325-40-50 mg (FIORICET) -40 MG per tablet 1 Tab 1 Tab Oral Given    05/28/2017 0332 dexamethasone (DECADRON) tablet 8 mg 8 mg Oral Given      Follow-up Information     1. Follow up with Long Linares M.D.. Go in 2 days.    Specialty:  Family Medicine    Why:  For complete recheck    Contact information    [address]  [address]  [facility] [address]  [phone]          2. Go to [facility], Emergency Dept.    Specialty:  Emergency Medicine    Why:  If symptoms worsen or do not continue to improve    Contact information    [address] [place] [id number] [phone]      Medication Information     Review all of your home medications and newly ordered medications with your primary doctor and/or pharmacist as soon as possible. Follow medication instructions as directed by your doctor and/or pharmacist.          Please keep your complete medication list with you and share with your physician. Update the information when medications are discontinued, doses are changed, or new medications (including over-the-counter products) are added; and carry medication information at all times in the event of emergency situations.               Medication List      ASK your doctor about these medications        Instructions    Morning Afternoon Evening Bedtime    ALBUTEROL INH        Inhale  by mouth.                        divalproex  MG Tb24   Commonly known as:  DEPAKOTE ER        Take 3 Tabs by mouth every evening.   Dose:  1500 mg                        levothyroxine 125 MCG Tabs   Commonly known as:  SYNTHROID        Take 125 mcg by mouth Every morning on an empty stomach.   Dose:  125 mcg                        prazosin 2 MG Caps   Commonly known as:  MINIPRESS        Take 2 mg by mouth every evening.   Dose:  2 mg                        sertraline 100 MG Tabs   Commonly known as:  ZOLOFT        Take 100 mg by mouth every morning.   Dose:  100 mg                        XALATAN 0.005 % Soln   Generic drug:  latanoprost        Place 1 Drop in both eyes every evening.   Dose:  1 Drop                                Procedures and tests performed during your visit     CARDIAC MONITORING    CBC WITH DIFFERENTIAL    COMP METABOLIC PANEL    ESTIMATED GFR    O2 Protocol    SALINE LOCK    VALPROIC ACID        Discharge Instructions       Please call your primary care physician Tuesday morning to let him know you were seen in the emergency department. Return to the emergency department if your symptoms recur including worsening headache, uncontrolled seizures, was given a new or worsening symptoms.      Epilepsy  Epilepsy is a disorder in which a person has repeated seizures over time. A seizure is a release of abnormal electrical activity in the brain. Seizures can cause a change in attention, behavior, or the ability to remain  awake and alert (altered mental status). Seizures often involve uncontrollable shaking (convulsions).   Most people with epilepsy lead normal lives. However, people with epilepsy are at an increased risk of falls, accidents, and injuries. Therefore, it is important to begin treatment right away.  CAUSES   Epilepsy has many possible causes. Anything that disturbs the normal pattern of brain cell activity can lead to seizures. This may include:   · Head injury.  · Birth trauma.  · High fever as a child.  · Stroke.  · Bleeding into or around the brain.  · Certain drugs.  · Prolonged low oxygen, such as what occurs after CPR efforts.  · Abnormal brain development.  · Certain illnesses, such as meningitis, encephalitis (brain infection), malaria, and other infections.  · An imbalance of nerve signaling chemicals (neurotransmitters).    SIGNS AND SYMPTOMS   The symptoms of a seizure can vary greatly from one person to another. Right before a seizure, you may have a warning (aura) that a seizure is about to occur. An aura may include the following symptoms:  · Fear or anxiety.  · Nausea.  · Feeling like the room is spinning (vertigo).  · Vision changes, such as seeing flashing lights or spots.  Common symptoms during a seizure include:  · Abnormal sensations, such as an abnormal smell or a bitter taste in the mouth.    · Sudden, general body stiffness.    · Convulsions that involve rhythmic jerking of the face, arm, or leg on one or both sides.    · Sudden change in consciousness.    ¨ Appearing to be awake but not responding.    ¨ Appearing to be asleep but cannot be awakened.    · Grimacing, chewing, lip smacking, drooling, tongue biting, or loss of bowel or bladder control.  After a seizure, you may feel sleepy for a while.   DIAGNOSIS   Your health care provider will ask about your symptoms and take a medical history. Descriptions from any witnesses to your seizures will be very helpful in the diagnosis. A physical  exam, including a detailed neurological exam, is necessary. Various tests may be done, such as:   · An electroencephalogram (EEG). This is a painless test of your brain waves. In this test, a diagram is created of your brain waves. These diagrams can be interpreted by a specialist.  · An MRI of the brain.    · A CT scan of the brain.    · A spinal tap (lumbar puncture, LP).  · Blood tests to check for signs of infection or abnormal blood chemistry.  TREATMENT   There is no cure for epilepsy, but it is generally treatable. Once epilepsy is diagnosed, it is important to begin treatment as soon as possible. For most people with epilepsy, seizures can be controlled with medicines. The following may also be used:  · A pacemaker for the brain (vagus nerve stimulator) can be used for people with seizures that are not well controlled by medicine.  · Surgery on the brain.  For some people, epilepsy eventually goes away.  HOME CARE INSTRUCTIONS   · Follow your health care provider's recommendations on driving and safety in normal activities.  · Get enough rest. Lack of sleep can cause seizures.  · Only take over-the-counter or prescription medicines as directed by your health care provider. Take any prescribed medicine exactly as directed.  · Avoid any known triggers of your seizures.  · Keep a seizure diary. Record what you recall about any seizure, especially any possible trigger.    · Make sure the people you live and work with know that you are prone to seizures. They should receive instructions on how to help you. In general, a witness to a seizure should:    ¨ Cushion your head and body.    ¨ Turn you on your side.    ¨ Avoid unnecessarily restraining you.    ¨ Not place anything inside your mouth.    ¨ Call for emergency medical help if there is any question about what has occurred.    · Follow up with your health care provider as directed. You may need regular blood tests to monitor the levels of your medicine.     SEEK MEDICAL CARE IF:   · You develop signs of infection or other illness. This might increase the risk of a seizure.    · You seem to be having more frequent seizures.    · Your seizure pattern is changing.    SEEK IMMEDIATE MEDICAL CARE IF:   · You have a seizure that does not stop after a few moments.    · You have a seizure that causes any difficulty in breathing.    · You have a seizure that results in a very severe headache.    · You have a seizure that leaves you with the inability to speak or use a part of your body.       This information is not intended to replace advice given to you by your health care provider. Make sure you discuss any questions you have with your health care provider.     Document Released: 12/18/2006 Document Revised: 10/08/2014 Document Reviewed: 07/30/2014  [employer] Interactive Patient Education ©2016 [employer]            Patient Information     Patient Information    Following emergency treatment: all patient requiring follow-up care must return either to a private physician or a clinic if your condition worsens before you are able to obtain further medical attention, please return to the emergency room.     Billing Information    At [facility], we work to make the billing process streamlined for our patients.  Our Representatives are here to answer any questions you may have regarding your hospital bill.  If you have insurance coverage and have supplied your insurance information to us, we will submit a claim to your insurer on your behalf.  Should you have any questions regarding your bill, we can be reached online or by phone as follows:  Online: You are able pay your bills online or live chat with our representatives about any billing questions you may have. We are here to help Monday - Friday from 8:00am to 7:30pm and 9:00am - 12:00pm on Saturdays.  Please visit [email or web address]  for more information.   Phone:  [phone] or  [phone]    Please note that your emergency physician, surgeon, pathologist, radiologist, anesthesiologist, and other specialists are not employed by [facility] and will therefore bill separately for their services.  Please contact them directly for any questions concerning their bills at the numbers below:     Emergency Physician Services:  [phone]  [facility]:  [phone]  [facility]:  [phone]  [facility]:  [phone]    1. Your final bill may vary from the amount quoted upon discharge if all procedures are not complete at that time, or if your doctor has additional procedures of which we are not aware. You will receive an additional bill if you return to the Emergency Department at [facility] for suture removal regardless of the facility of which the sutures were placed.     2. Please arrange for settlement of this account at the emergency registration.    3. All self-pay accounts are due in full at the time of treatment.  If you are unable to meet this obligation then payment is expected within 4-5 days.     4. If you have had radiology studies (CT, X-ray, Ultrasound, MRI), you have received a preliminary result during your emergency department visit. Please contact the radiology department [phone] to receive a copy of your final result. Please discuss the Final result with your primary physician or with the follow up physician provided.     Crisis Hotline:  [place] Crisis Hotline:  5-747-MMONZXX or 1-198.229.7505  [place] Crisis Hotline:    1-352.184.9848 or 363-325-8421         ED Discharge Follow Up Questions    1. In order to provide you with very good care, we would like to follow up with a phone call in the next few days.  May we have your permission to contact you?     YES /  NO    2. What is the best phone number to call you? (       )_____-__________    3. What is the best time to call you?      Morning  /  Afternoon  /   Evening                   Patient Signature:  ____________________________________________________________    Date:  ____________________________________________________________

## 2017-06-24 ENCOUNTER — HOSPITAL ENCOUNTER (EMERGENCY)
Facility: MEDICAL CENTER | Age: 37
End: 2017-06-24
Attending: EMERGENCY MEDICINE
Payer: MEDICAID

## 2017-06-24 ENCOUNTER — APPOINTMENT (OUTPATIENT)
Dept: RADIOLOGY | Facility: MEDICAL CENTER | Age: 37
End: 2017-06-24
Attending: EMERGENCY MEDICINE
Payer: MEDICAID

## 2017-06-24 VITALS
HEIGHT: 74 IN | OXYGEN SATURATION: 97 % | DIASTOLIC BLOOD PRESSURE: 72 MMHG | RESPIRATION RATE: 18 BRPM | WEIGHT: 293 LBS | SYSTOLIC BLOOD PRESSURE: 142 MMHG | TEMPERATURE: 96.8 F | BODY MASS INDEX: 37.6 KG/M2 | HEART RATE: 57 BPM

## 2017-06-24 DIAGNOSIS — R51.9 NONINTRACTABLE HEADACHE, UNSPECIFIED CHRONICITY PATTERN, UNSPECIFIED HEADACHE TYPE: ICD-10-CM

## 2017-06-24 DIAGNOSIS — R56.9 SEIZURE (HCC): ICD-10-CM

## 2017-06-24 LAB — VALPROATE SERPL-MCNC: <1 UG/ML (ref 50–100)

## 2017-06-24 PROCEDURE — 80164 ASSAY DIPROPYLACETIC ACD TOT: CPT

## 2017-06-24 PROCEDURE — 99284 EMERGENCY DEPT VISIT MOD MDM: CPT

## 2017-06-24 PROCEDURE — 700102 HCHG RX REV CODE 250 W/ 637 OVERRIDE(OP): Performed by: EMERGENCY MEDICINE

## 2017-06-24 PROCEDURE — 70450 CT HEAD/BRAIN W/O DYE: CPT

## 2017-06-24 PROCEDURE — 700111 HCHG RX REV CODE 636 W/ 250 OVERRIDE (IP): Performed by: EMERGENCY MEDICINE

## 2017-06-24 PROCEDURE — 96372 THER/PROPH/DIAG INJ SC/IM: CPT

## 2017-06-24 PROCEDURE — 36415 COLL VENOUS BLD VENIPUNCTURE: CPT

## 2017-06-24 PROCEDURE — A9270 NON-COVERED ITEM OR SERVICE: HCPCS | Performed by: EMERGENCY MEDICINE

## 2017-06-24 RX ORDER — HYDROCODONE BITARTRATE AND ACETAMINOPHEN 5; 325 MG/1; MG/1
1 TABLET ORAL ONCE
Status: COMPLETED | OUTPATIENT
Start: 2017-06-24 | End: 2017-06-24

## 2017-06-24 RX ORDER — DIVALPROEX SODIUM 500 MG/1
1000 TABLET, EXTENDED RELEASE ORAL DAILY
Status: DISCONTINUED | OUTPATIENT
Start: 2017-06-24 | End: 2017-06-24 | Stop reason: HOSPADM

## 2017-06-24 RX ORDER — DIVALPROEX SODIUM 500 MG/1
1500 TABLET, EXTENDED RELEASE ORAL DAILY
Status: DISCONTINUED | OUTPATIENT
Start: 2017-06-24 | End: 2017-06-24

## 2017-06-24 RX ADMIN — DIVALPROEX SODIUM 1000 MG: 500 TABLET, EXTENDED RELEASE ORAL at 10:15

## 2017-06-24 RX ADMIN — HYDROMORPHONE HYDROCHLORIDE 1 MG: 1 INJECTION, SOLUTION INTRAMUSCULAR; INTRAVENOUS; SUBCUTANEOUS at 06:47

## 2017-06-24 RX ADMIN — HYDROCODONE BITARTRATE AND ACETAMINOPHEN 1 TABLET: 5; 325 TABLET ORAL at 04:37

## 2017-06-24 ASSESSMENT — PAIN SCALES - GENERAL
PAINLEVEL_OUTOF10: 3
PAINLEVEL_OUTOF10: 10

## 2017-06-24 ASSESSMENT — LIFESTYLE VARIABLES: DO YOU DRINK ALCOHOL: NO

## 2017-06-24 NOTE — ED PROVIDER NOTES
ED Provider Note    CHIEF COMPLAINT  Chief Complaint   Patient presents with   • Headache     pt states having headaches frequently which is increasing his seizures he is having denies any head trauma, had seizure at work tonight aprox 4 hours ago   • Seizure   • Neck Pain       HPI  Norm Prbahakar is a 37 y.o. male who presents with headache, for the last 7 days, headache is moderate, dull, identical to headaches she's had with seizures for many years. Evidently has a history of seizure since 20, last seizure about a month ago until tonight. Had another seizure tonight. He is on Depakote for seizures and has not missed any medication. Has had thorough workup for seizures in the past.    REVIEW OF SYSTEMS  See HPI for further details. History of asthma, hypothyroidism and depression thyroidectomy blindness left eye depression and pneumoniaDenies other G.I., G.U.. endrocine, cardiovascular, respriatory or neurological problems.  All other systems are negative.     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   • Psychiatric problem 2002     PTSD   • Mental disorder      learning disabilities; speech impairment; developmental delays   • Seizure (CMS-[facility]) 2010   • Pneumonia      remote   • Indigestion      once in a while   • Unspecified disorder of thyroid        FAMILY HISTORY  Family History   Problem Relation Age of Onset   • Hypertension Mother    • Heart Disease Mother    • Lung Disease Mother    • Stroke Maternal Grandmother        SOCIAL HISTORY  Social History     Social History   • Marital Status: Single     Spouse Name: N/A   • Number of Children: N/A   • Years of Education: N/A     Social History Main  "Topics   • Smoking status: Current Every Day Smoker -- 0.25 packs/day for 4 years     Types: Cigarettes     Last Attempt to Quit: 01/01/2014   • Smokeless tobacco: Never Used   • Alcohol Use: No   • Drug Use: No   • Sexual Activity: Not Asked     Other Topics Concern   • None     Social History Narrative       SURGICAL HISTORY  Past Surgical History   Procedure Laterality Date   • Eye surgery     • Thyroid lobectomy     • Other       Hernia Repair when he was 10 yrs old       CURRENT MEDICATIONS  Home Medications     Reviewed by Jocelynn Ross R.N. (Registered Nurse) on 06/24/17 at 0326  Med List Status: Complete    Medication Last Dose Status    ALBUTEROL INH 5/27/2017 Active    divalproex ER (DEPAKOTE ER) 500 MG TABLET SR 24 HR 5/27/2017 Active    latanoprost (XALATAN) 0.005 % Solution 5/27/2017 Active    levothyroxine (SYNTHROID) 125 MCG TABS 5/27/2017 Active    prazosin (MINIPRESS) 2 MG Cap 5/27/2017 Active    sertraline (ZOLOFT) 100 MG TABS 5/27/2017 Active                ALLERGIES  Allergies   Allergen Reactions   • Abilify Unspecified     \"Feeling tired, like I don't even know whats going on around me\"   • Fish        PHYSICAL EXAM  VITAL SIGNS: /72 mmHg  Pulse 77  Temp(Src) 36 °C (96.8 °F)  Resp 14  Ht 1.88 m (6' 2\")  Wt 132.904 kg (293 lb)  BMI 37.60 kg/m2  SpO2 97%  Constitutional: Well developed, Well nourished, No acute distress, Non-toxic appearance.   HENT: Normocephalic, Atraumatic, Bilateral external ears normal, Oropharynx moist, No oral exudates, Nose normal.   Eyes: PERRL, EOMI, Conjunctiva normal, No discharge.   Neck: Normal range of motion, No tenderness, Supple, No stridor.   Lymphatic: No lymphadenopathy noted.   Cardiovascular: Normal heart rate, Normal rhythm, No murmurs, No rubs, No gallops.   Thorax & Lungs: Normal breath sounds, No respiratory distress, No wheezing, No chest tenderness.   Abdomen:  No tenderness, no guarding no rigidity and the abdomen is soft.  No " masses, No pulsatile masses.  Skin: Warm, Dry, No erythema, No rash.   Back: No tenderness, No CVA tenderness.   Extremities: Intact distal pulses, No edema, No tenderness, No cyanosis, No clubbing.   Musculoskeletal: Good range of motion in all major joints. No tenderness to palpation or major deformities noted.   Neurologic: Alert & oriented x 3, Normal motor function, Normal sensory function, No focal deficits noted.   Psychiatric: Affect normal, Judgment normal, Mood normal.       RADIOLOGY/PROCEDURES  CT-HEAD W/O   Final Result      1.  No acute intracranial abnormality.   2.  Extensive chronic white matter changes, grossly stable compared with the recent prior scan.               INTERPRETING LOCATION:  [address] [place] [address], [facility]            COURSE & MEDICAL DECISION MAKING  Pertinent Labs & Imaging studies reviewed. (See chart for details)    This patient has a long history of seizures, headaches, no different today. Last seizure was about a month ago, seizure again tonight. Does also complain of frequent headaches. CAT scan demonstrates no acute abnormalities. Will be given Norco for pain. Referral for him to follow-up with the on call neurologist, Dr. dunn. He has been taking his medication,    This patient has a long history of seizure. Tells me he is on Depakote but his level is negligible. Even though he says he has not missed his medication. I suspect he may not be taking his medications. CAT scan today looks normal. I emphasized to him how important was a taking his medication.  FINAL IMPRESSION  1.   1. Nonintractable headache, unspecified chronicity pattern, unspecified headache type    2. Seizure (CMS-HCC)        2.   3.     Disposition  Discharge instructions are understood. This patient is to return if fever vomiting or no better in 12 hours. Follow up with the on-call neurologist Dr. Wood. Information sheets on headache seizure  Electronically signed by: Jose Gan, 6/24/2017 4:14  AM

## 2017-06-24 NOTE — ED NOTES
.  Chief Complaint   Patient presents with   • Headache     pt states having headaches frequently which is increasing his seizures he is having denies any head trauma, had seizure at work tonight aprox 4 hours ago   • Seizure   • Neck Pain     .Pt Informed regarding triage process and verbalized understanding to inform triage tech or RN for any changes in condition.  Placed in lobby.

## 2017-06-24 NOTE — ED NOTES
Per patient he takes 1000mg of Depakote daily, MAR showed 1500. He said that his medication was changed to 1000mg.

## 2017-06-24 NOTE — ED NOTES
Pt updated on POC. Siderails wrapped for seizure precautions. Pt reports no improvement to headache from prev norco.

## 2017-06-24 NOTE — ED AVS SNAPSHOT
6/24/2017    Norm Prabhakar  [address] [facility] [address]    Dear Norm:    [facility] wants to ensure your discharge home is safe and you or your loved ones have had all of your questions answered regarding your care after you leave the hospital.    Below is a list of resources and contact information should you have any questions regarding your hospital stay, follow-up instructions, or active medical symptoms.    Questions or Concerns Regarding… Contact   Medical Questions Related to Your Discharge  (7 days a week, 8am-5pm) Contact a Nurse Care Coordinator   [phone]   Medical Questions Not Related to Your Discharge  (24 hours a day / 7 days a week)  Contact the Nurse Health Line   [phone]    Medications or Discharge Instructions Refer to your discharge packet   or contact your [facility] Primary Care Provider   [phone]   Follow-up Appointment(s) Schedule your appointment via "[employer]"   or contact Scheduling [phone]   Billing Review your statement via "[employer]"  or contact Billing [phone]   Medical Records Review your records via "[employer]"   or contact Medical Records [phone]     You may receive a telephone call within two days of discharge. This call is to make certain you understand your discharge instructions and have the opportunity to have any questions answered. You can also easily access your medical information, test results and upcoming appointments via the "[employer]" free online health management tool. You can learn more and sign up at [employer] For assistance setting up your "[employer]" account, please call [phone].    Once again, we want to ensure your discharge home is safe and that you have a clear understanding of any next steps in your care. If you have any questions or concerns, please do not hesitate to contact us, we are here for you. Thank you for choosing [facility] for your healthcare needs.    Sincerely,    Your [facility] Healthcare Team

## 2017-06-24 NOTE — ED AVS SNAPSHOT
Home Care Instructions                                                                                                                Norm Prabhakar   MRN: [id number]    Department:  [facility], Emergency Dept   Date of Visit:  6/24/2017            [facility], Emergency Dept    [address]    [place] [address]    Phone:  [phone]      You were seen by     Jose Gan M.D.      Your Diagnosis Was     Nonintractable headache, unspecified chronicity pattern, unspecified headache type     R51       These are the medications you received during your hospitalization from 06/24/2017 0305 to 06/24/2017 1017     Date/Time Order Dose Route Action    06/24/2017 0437 hydrocodone-acetaminophen (NORCO) 5-325 MG per tablet 1 Tab 1 Tab Oral Given    06/24/2017 0647 HYDROmorphone (DILAUDID) injection 1 mg 1 mg Intramuscular Given    06/24/2017 1015 divalproex ER (DEPAKOTE ER) tablet 1,000 mg 1,000 mg Oral Given      Follow-up Information     1. Follow up with Umer Ayala M.D.. Schedule an appointment as soon as possible for a visit today.    Specialty:  Neurology    Contact information    [address] [place] [address] [phone]        Medication Information     Review all of your home medications and newly ordered medications with your primary doctor and/or pharmacist as soon as possible. Follow medication instructions as directed by your doctor and/or pharmacist.     Please keep your complete medication list with you and share with your physician. Update the information when medications are discontinued, doses are changed, or new medications (including over-the-counter products) are added; and carry medication information at all times in the event of emergency situations.               Medication List      ASK your doctor about these medications        Instructions    Morning Afternoon Evening Bedtime    ALBUTEROL INH        Inhale  by mouth.                       divalproex  MG Tb24   Last time this was given:  1,000 mg on 6/24/2017 10:15 AM   Commonly known as:  DEPAKOTE ER        Take 3 Tabs by mouth every evening.   Dose:  1500 mg                        levothyroxine 125 MCG Tabs   Commonly known as:  SYNTHROID        Take 125 mcg by mouth Every morning on an empty stomach.   Dose:  125 mcg                        prazosin 2 MG Caps   Commonly known as:  MINIPRESS        Take 2 mg by mouth every evening.   Dose:  2 mg                        sertraline 100 MG Tabs   Commonly known as:  ZOLOFT        Take 100 mg by mouth every morning.   Dose:  100 mg                        XALATAN 0.005 % Soln   Generic drug:  latanoprost        Place 1 Drop in both eyes every evening.   Dose:  1 Drop                                Procedures and tests performed during your visit     CT-HEAD W/O    VALPROIC ACID            Patient Information     Patient Information    Following emergency treatment: all patient requiring follow-up care must return either to a private physician or a clinic if your condition worsens before you are able to obtain further medical attention, please return to the emergency room.     Billing Information    At [facility], we work to make the billing process streamlined for our patients.  Our Representatives are here to answer any questions you may have regarding your hospital bill.  If you have insurance coverage and have supplied your insurance information to us, we will submit a claim to your insurer on your behalf.  Should you have any questions regarding your bill, we can be reached online or by phone as follows:  Online: You are able pay your bills online or live chat with our representatives about any billing questions you may have. We are here to help Monday - Friday from 8:00am to 7:30pm and 9:00am - 12:00pm on Saturdays.  Please visit [email or web address]  for more information.   Phone:  [phone] or  [phone]    Please note that your emergency physician, surgeon, pathologist, radiologist, anesthesiologist, and other specialists are not employed by [facility] and will therefore bill separately for their services.  Please contact them directly for any questions concerning their bills at the numbers below:     [facility]:  [phone]  [facility]:  [phone]  [facility]:  [phone]  [facility]:  [phone]    1. Your final bill may vary from the amount quoted upon discharge if all procedures are not complete at that time, or if your doctor has additional procedures of which we are not aware. You will receive an additional bill if you return to the Emergency Department at [facility] for suture removal regardless of the facility of which the sutures were placed.     2. Please arrange for settlement of this account at the emergency registration.    3. All self-pay accounts are due in full at the time of treatment.  If you are unable to meet this obligation then payment is expected within 4-5 days.     4. If you have had radiology studies (CT, X-ray, Ultrasound, MRI), you have received a preliminary result during your emergency department visit. Please contact the radiology department [phone] to receive a copy of your final result. Please discuss the Final result with your primary physician or with the follow up physician provided.     Crisis Hotline:  [place] Crisis Hotline:  6-285-XRUNIRJ or 1-908.945.5074  [place] Crisis Hotline:    1-813.726.5823 or 025-454-0617         ED Discharge Follow Up Questions    1. In order to provide you with very good care, we would like to follow up with a phone call in the next few days.  May we have your permission to contact you?     YES /  NO    2. What is the best phone number to call you? (       )_____-__________    3. What is the best time to call you?      Morning  /  Afternoon  /   Evening                   Patient Signature:  ____________________________________________________________    Date:  ____________________________________________________________

## 2017-06-24 NOTE — ED AVS SNAPSHOT
Healthcare MarketMaker Access Code: [email or web address]  Expires: 6/27/2017  3:53 AM    Healthcare MarketMaker  A secure, online tool to manage your health information     [employer]’s Healthcare MarketMaker® is a secure, online tool that connects you to your personalized health information from the privacy of your home -- day or night - making it very easy for you to manage your healthcare. Once the activation process is completed, you can even access your medical information using the Healthcare MarketMaker jodi, which is available for free in the Apple Jodi store or Google Play store.     Healthcare MarketMaker provides the following levels of access (as shown below):   My Chart Features   [facility] Primary Care Doctor [facility]  Specialists [facility]  Urgent  Care Non-[facility]  Primary Care  Doctor   Email your healthcare team securely and privately 24/7 X X X X   Manage appointments: schedule your next appointment; view details of past/upcoming appointments X      Request prescription refills. X      View recent personal medical records, including lab and immunizations X X X X   View health record, including health history, allergies, medications X X X X   Read reports about your outpatient visits, procedures, consult and ER notes X X X X   See your discharge summary, which is a recap of your hospital and/or ER visit that includes your diagnosis, lab results, and care plan. X X       How to register for Healthcare MarketMaker:  1. Go to  [email or web address].  2. Click on the Sign Up Now box, which takes you to the New Member Sign Up page. You will need to provide the following information:  a. Enter your Healthcare MarketMaker Access Code exactly as it appears at the top of this page. (You will not need to use this code after you’ve completed the sign-up process. If you do not sign up before the expiration date, you must request a new code.)   b. Enter your date of birth.   c. Enter your home email address.   d. Click Submit, and follow the next screen’s instructions.  3. Create a Healthcare MarketMaker ID. This will be your Healthcare MarketMaker  login ID and cannot be changed, so think of one that is secure and easy to remember.  4. Create a [employer] password. You can change your password at any time.  5. Enter your Password Reset Question and Answer. This can be used at a later time if you forget your password.   6. Enter your e-mail address. This allows you to receive e-mail notifications when new information is available in [employer].  7. Click Sign Up. You can now view your health information.    For assistance activating your [employer] account, call [phone]

## 2017-07-08 ENCOUNTER — APPOINTMENT (OUTPATIENT)
Dept: RADIOLOGY | Facility: MEDICAL CENTER | Age: 37
End: 2017-07-08
Attending: EMERGENCY MEDICINE
Payer: MEDICAID

## 2017-07-08 ENCOUNTER — HOSPITAL ENCOUNTER (EMERGENCY)
Facility: MEDICAL CENTER | Age: 37
End: 2017-07-08
Attending: EMERGENCY MEDICINE
Payer: MEDICAID

## 2017-07-08 VITALS
OXYGEN SATURATION: 94 % | TEMPERATURE: 97.2 F | BODY MASS INDEX: 35.84 KG/M2 | HEART RATE: 59 BPM | RESPIRATION RATE: 16 BRPM | HEIGHT: 78 IN | WEIGHT: 309.75 LBS | SYSTOLIC BLOOD PRESSURE: 131 MMHG | DIASTOLIC BLOOD PRESSURE: 80 MMHG

## 2017-07-08 DIAGNOSIS — R16.0 HEPATOMEGALY: ICD-10-CM

## 2017-07-08 DIAGNOSIS — R10.32 LEFT GROIN PAIN: ICD-10-CM

## 2017-07-08 LAB
APPEARANCE UR: CLEAR
C TRACH DNA SPEC QL NAA+PROBE: NEGATIVE
COLOR UR AUTO: YELLOW
GLUCOSE UR QL STRIP.AUTO: NEGATIVE MG/DL
KETONES UR QL STRIP.AUTO: NEGATIVE MG/DL
LEUKOCYTE ESTERASE UR QL STRIP.AUTO: NEGATIVE
N GONORRHOEA DNA SPEC QL NAA+PROBE: NEGATIVE
NITRITE UR QL STRIP.AUTO: NEGATIVE
PH UR STRIP.AUTO: 6.5 [PH]
PROT UR QL STRIP: NEGATIVE MG/DL
RBC UR QL AUTO: ABNORMAL
SP GR UR: 1.01
SPECIMEN SOURCE: NORMAL

## 2017-07-08 PROCEDURE — 81002 URINALYSIS NONAUTO W/O SCOPE: CPT

## 2017-07-08 PROCEDURE — 74176 CT ABD & PELVIS W/O CONTRAST: CPT

## 2017-07-08 PROCEDURE — 87591 N.GONORRHOEAE DNA AMP PROB: CPT

## 2017-07-08 PROCEDURE — 96372 THER/PROPH/DIAG INJ SC/IM: CPT

## 2017-07-08 PROCEDURE — 99284 EMERGENCY DEPT VISIT MOD MDM: CPT

## 2017-07-08 PROCEDURE — 87491 CHLMYD TRACH DNA AMP PROBE: CPT

## 2017-07-08 PROCEDURE — 700111 HCHG RX REV CODE 636 W/ 250 OVERRIDE (IP)

## 2017-07-08 RX ORDER — KETOROLAC TROMETHAMINE 30 MG/ML
INJECTION, SOLUTION INTRAMUSCULAR; INTRAVENOUS
Status: COMPLETED
Start: 2017-07-08 | End: 2017-07-08

## 2017-07-08 RX ORDER — KETOROLAC TROMETHAMINE 30 MG/ML
60 INJECTION, SOLUTION INTRAMUSCULAR; INTRAVENOUS ONCE
Status: COMPLETED | OUTPATIENT
Start: 2017-07-08 | End: 2017-07-08

## 2017-07-08 RX ADMIN — KETOROLAC TROMETHAMINE 60 MG: 30 INJECTION, SOLUTION INTRAMUSCULAR; INTRAVENOUS at 06:17

## 2017-07-08 RX ADMIN — KETOROLAC TROMETHAMINE 60 MG: 30 INJECTION, SOLUTION INTRAMUSCULAR at 06:17

## 2017-07-08 ASSESSMENT — PAIN SCALES - GENERAL
PAINLEVEL_OUTOF10: 9
PAINLEVEL_OUTOF10: 9

## 2017-07-08 NOTE — DISCHARGE INSTRUCTIONS
Pain Without a Known Cause  WHAT IS PAIN WITHOUT A KNOWN CAUSE?  Pain can occur in any part of the body and can range from mild to severe. Sometimes no cause can be found for why you are having pain. Some types of pain that can occur without a known cause include:   · Headache.  · Back pain.  · Abdominal pain.  · Neck pain.  HOW IS PAIN WITHOUT A KNOWN CAUSE DIAGNOSED?   Your health care provider will try to find the cause of your pain. This may include:  · Physical exam.  · Medical history.  · Blood tests.  · Urine tests.  · X-rays.  If no cause is found, your health care provider may diagnose you with pain without a known cause.   IS THERE TREATMENT FOR PAIN WITHOUT A CAUSE?   Treatment depends on the kind of pain you have. Your health care provider may prescribe medicines to help relieve your pain.   WHAT CAN I DO AT HOME FOR MY PAIN?   · Take medicines only as directed by your health care provider.  · Stop any activities that cause pain. During periods of severe pain, bed rest may help.  · Try to reduce your stress with activities such as yoga or meditation. Talk to your health care provider for other stress-reducing activity recommendations.  · Exercise regularly, if approved by your health care provider.  · Eat a healthy diet that includes fruits and vegetables. This may improve pain. Talk to your health care provider if you have any questions about your diet.  WHAT IF MY PAIN DOES NOT GET BETTER?   If you have a painful condition and no reason can be found for the pain or the pain gets worse, it is important to follow up with your health care provider. It may be necessary to repeat tests and look further for a possible cause.      This information is not intended to replace advice given to you by your health care provider. Make sure you discuss any questions you have with your health care provider.     Document Released: 09/12/2002 Document Revised: 01/08/2016 Document Reviewed: 05/05/2015  [employer]  Interactive Patient Education ©2016 [employer]        Hepatomegaly  Hepatomegaly is when the liver is larger than normal or is enlarged. Some health problems can cause the liver to get bigger. Some people have an enlarged liver, but they do not know it.  CAUSES  This condition may be caused by:  · Cirrhosis. This is long-term (chronic) liver damage that is often caused by drinking too much alcohol. Cirrhosis is also caused by metabolic disease, infection, and some drugs.  · Hepatitis. This is an infection of the liver.  · Fatty liver disease.  · Conditions that cause minerals or trace elements to accumulate in the liver, such as Carlos disease.  · Cancer. The disease may start in the liver, or cancer may start somewhere else in the body and spread to the liver.  · Heart or blood vessel disease. These can cause hepatomegaly if blood backs up into the liver.  The cause may also not be known.  RISK FACTORS  This condition is more likely to develop in people who:  · Drink too much alcohol.  · Have diabetes.  · Are obese.  · Use any tobacco products, including cigarettes, chewing tobacco, and e-cigarettes.  SYMPTOMS  Symptoms of this condition include:  · Abdominal pain on the right side.  · Fatigue.  · Loss of appetite.  · Nausea.  · Vomiting.  · Yellowing of the skin and the whites of the eyes (jaundice).  In some cases, there are no symptoms.  DIAGNOSIS  This condition may be diagnosed with a medical history and physical exam. Your health care provider may press on the right side of your abdomen to feel your liver. This is a way to check if the edge of your liver sticks out below your rib cage. You may also have other tests, including:  · Blood tests. These tests check whether your liver is working like it should. They also check for infection.  · Imaging tests, such as:  ¨ CT scan. This is an X-ray that is guided by a computer.  ¨ MRI. This creates pictures by using magnets and a computer.  ¨ An ultrasound. This  test uses sound waves to make images.  ¨ Liver biopsy. A small sample of liver tissue is removed to be examined under a microscope.  TREATMENT  Treatment of this condition depends on what is causing it.  HOME CARE INSTRUCTIONS  What you need to do at home depends on what is causing the condition. In general:  · Take over-the-counter and prescription medicines only as told by your health care provider.  · Do not start taking any new medicine unless your health care provider has approved. These include over-the-counter medicines, supplements, and herbal remedies. Some of these can hurt your liver.  · Maintain a healthy weight.  · Follow a healthy diet. Eat plenty of fruit, vegetables, and whole grains.  · Do not drink alcohol.  · Do not use any tobacco products, including cigarettes, chewing tobacco, and e-cigarettes. If you need help quitting, ask your health care provider.  · Keep all follow-up visits as told by your health care provider. This is important.  SEEK MEDICAL CARE IF:  · You have increased pain in your abdomen.  · You have persistent vomiting.  · You have new symptoms.  · Your symptoms get worse.  SEEK IMMEDIATE MEDICAL CARE IF:  · You have bright red blood in your vomit, or your vomit looks like coffee grounds.  · You have chest pain.  · You have trouble breathing.     This information is not intended to replace advice given to you by your health care provider. Make sure you discuss any questions you have with your health care provider.     Document Released: 03/11/2013 Document Revised: 05/03/2016 Document Reviewed: 01/27/2016  [employer] Interactive Patient Education ©2016 [employer]

## 2017-07-08 NOTE — ED PROVIDER NOTES
"ED Provider Note    Scribed for Michele Oneill M.D. by Moo Mejia. 7/8/2017  5:41 AM    Primary care provider: Long Linares M.D.  Means of arrival: walk-in  History obtained from: patient  History limited by: none    CHIEF COMPLAINT  Chief Complaint   Patient presents with   • Groin Pain     left side x5 days   • Urinary Pain     x5 days, denies blood in urine.       HPI  Norm Parbhakar is a 37 y.o. male who presents to the Emergency Department for evaluation of left groin pain onset five days ago. The patient rates his pain as severe. He describes his pain as a \"sharp, stabbing\" pain that does not radiate to other areas. He is experiencing associated dysuria. The patient denies any recent injury. Per patient, he was seen at [facility] two days ago. He states he had a CT, but he \"was not told very much\".  Patient states he had an inguinal hernia when he was a child, but has not had one since. Patient also notes a rash throughout his legs and abdomen, but states it is chronic due to an allergy to Abilify. The patient denies testicular pain, groin swelling, hematuria, vomiting, diarrhea, constipation, leg pain.     REVIEW OF SYSTEMS  Pertinent positives include groin pain, dysuria, rash (chronic).   Pertinent negatives include no testicular pain, groin swelling, hematuria, vomiting, diarrhea, constipation, leg pain.    All other systems reviewed and negative.        C.    PAST MEDICAL HISTORY   has a past medical history of ASTHMA; Glaucoma; Hypothyroidism; Depression; Anxiety; Seizure disorder (CMS-HCC); Bipolar 1 disorder (CMS-HCC); S/P thyroidectomy; Murmur; Fall; Glaucoma (1982); Psychiatric problem (2002); Mental disorder; Seizure (CMS-[facility]) (2010); Pneumonia; Indigestion; and Unspecified disorder of thyroid.    SURGICAL HISTORY   has past surgical history that includes eye surgery; thyroid lobectomy; and other.    SOCIAL HISTORY  Social History   Substance Use Topics   • Smoking status: Former Smoker -- 0.25 " "packs/day for 4 years     Types: Cigarettes     Quit date: 01/01/2014   • Smokeless tobacco: Never Used   • Alcohol Use: No      History   Drug Use No       FAMILY HISTORY  Family History   Problem Relation Age of Onset   • Hypertension Mother    • Heart Disease Mother    • Lung Disease Mother    • Stroke Maternal Grandmother        CURRENT MEDICATIONS  No current facility-administered medications on file prior to encounter.     Current Outpatient Prescriptions on File Prior to Encounter   Medication Sig Dispense Refill   • latanoprost (XALATAN) 0.005 % Solution Place 1 Drop in both eyes every evening.     • ALBUTEROL INH Inhale  by mouth.     • divalproex ER (DEPAKOTE ER) 500 MG TABLET SR 24 HR Take 3 Tabs by mouth every evening. 90 Tab 1   • prazosin (MINIPRESS) 2 MG Cap Take 2 mg by mouth every evening.     • levothyroxine (SYNTHROID) 125 MCG TABS Take 125 mcg by mouth Every morning on an empty stomach.     • sertraline (ZOLOFT) 100 MG TABS Take 100 mg by mouth every morning.        ALLERGIES  Allergies   Allergen Reactions   • Abilify Unspecified     \"Feeling tired, like I don't even know whats going on around me\"   • Fish        PHYSICAL EXAM  VITAL SIGNS: /80 mmHg  Pulse 79  Temp(Src) 36.2 °C (97.2 °F)  Resp 16  Ht 1.981 m (6' 6\")  Wt 140.5 kg (309 lb 11.9 oz)  BMI 35.80 kg/m2  SpO2 97%    Nursing note and vitals reviewed.  Constitutional: Well-developed and well-nourished. Mild distress secondary to pain.    HENT: Head is normocephalic and atraumatic. Oropharynx is clear and moist without exudate or erythema.   Eyes: Pupils are equal, round, and reactive to light. Conjunctiva are normal.   Cardiovascular: Normal rate and regular rhythm. No murmur heard. Normal radial pulses.  Pulmonary/Chest: Breath sounds normal. No wheezes or rales.   Abdominal: Soft. No distention. Tenderness of left inguinal canal, no palpable mass, no adenopathy.   Musculoskeletal: Extremities exhibit normal range of motion " without edema or tenderness.   Neurological: Awake, alert and oriented to person, place, and time. No focal deficits noted.  Skin: Skin is warm and dry.  Psychiatric: Normal mood and affect. Appropriate for clinical situation    DIAGNOSTIC STUDIES / PROCEDURES    LABS  Results for orders placed or performed during the hospital encounter of 07/08/17   CHLAMYDIA/GC PCR URINE OR SWAB   Result Value Ref Range    Source Urine    POC UA   Result Value Ref Range    POC Color Yellow     POC Appearance Clear     POC Glucose Negative Negative mg/dL    POC Ketones Negative Negative mg/dL    POC Specific Gravity 1.015 1.005-1.030    POC Blood Trace-intact (A) Negative    POC Urine PH 6.5 5.0-8.0    POC Protein Negative Negative mg/dL    POC Nitrites Negative Negative    POC Leukocyte Esterase Negative Negative      All labs reviewed by me.    RADIOLOGY  CT-RENAL COLIC EVALUATION(A/P W/O)   Final Result      1.  Negative for renal or ureteral calculi      2.  Marked hepatomegaly and mild splenomegaly        The radiologist's interpretation of all radiological studies have been reviewed by me.    COURSE & MEDICAL DECISION MAKING  Nursing notes, VS, PMSFHx reviewed in chart.     Review of past medical records shows the patient was last seen two weeks ago for unrelated complaints.      4:43 - Ordered POC UA to evaluate the patient's symptoms.     5:45 AM - Obtained medical records from [facility]. UA was negative. CBC was unremarkable. Metabolic panel was unremarkable. CT scan of the abdomen pelvis negative.    5:46 AM  - Patient seen and examined at bedside. I discussed the patient's UA results, which show trace amounts of blood. Patient will be treated with 60 mg Toradol. Ordered renal colic CT, chlamydia/GC by PCR urine or swab to evaluate his symptoms. I discussed the treatment plan as above with the patient. He understood and verbalized agreement. The differential diagnoses include but are not limited to: ureteral lithiasis, hernia,  lymphadenopathy, musculoskeletal pain.      7:35 AM - I reevaluated the patient at bedside. He is resting comfortably. I updated the patient on his lab and radiology results, which are negative for renal or ureteral calcui but marked hepatomegaly and mild splenomegaly noted. I advised the patient to follow-up with [facility] as soon as possible, for which I will provide a referral. I welcomed the patient to return to the ED with new or worsening symptoms. He understood and verbalized agreement.       The patient will return for new or worsening symptoms and is stable at the time of discharge.    The patient is referred to a primary physician for blood pressure management, diabetic screening, and for all other preventative health concerns.    DISPOSITION:  Patient will be discharged home in stable condition.    FOLLOW UP:  [facility], Emergency Dept  [address] [address] [phone]    If symptoms worsen    [facility]  [address] [place] [address] [phone]  Schedule an appointment as soon as possible for a visit  discuss enlarged liver seen on CT scan    The patient was discharged home with an information sheet on pain without a known cause and hepatomegaly and told to return immediately for any signs or symptoms listed. The patient agreed to the discharge precautions and follow-up plan which is documented in EPIC.     FINAL IMPRESSION  1. Left groin pain    2. Hepatomegaly          Moo ROB (Stephanie), am scribing for, and in the presence of, Michele Oneill M.D..    Electronically signed by: Moo Mejia (Stephanie), 7/8/2017    Michele ROB M.D. personally performed the services described in this documentation, as scribed by Moo Mejia in my presence, and it is both accurate and complete.    The note accurately reflects work and decisions made by me.  Michele Oneill  7/8/2017  11:07 AM

## 2017-07-08 NOTE — ED NOTES
"Patient ambulatory to triage  Chief Complaint   Patient presents with   • Groin Pain     left side x5 days   • Urinary Pain     x5 days, denies blood in urine.     Patient reports was recently diagnosed at [facility] with \"inflammation of the groin, if continue to be painful, go back to ED\".    Explained wait time and triage process to pt. Pt placed back out in lobby, told to notify ED tech or triage RN of any changes, verbalized understanding.    "

## 2017-07-08 NOTE — ED AVS SNAPSHOT
EZMove Access Code: [email or web address]  Expires: 8/7/2017  7:41 AM    EZMove  A secure, online tool to manage your health information     [employer]’s EZMove® is a secure, online tool that connects you to your personalized health information from the privacy of your home -- day or night - making it very easy for you to manage your healthcare. Once the activation process is completed, you can even access your medical information using the EZMove jodi, which is available for free in the Apple Jodi store or Google Play store.     EZMove provides the following levels of access (as shown below):   My Chart Features   [facility] Primary Care Doctor [facility]  Specialists [facility]  Urgent  Care Non-[facility]  Primary Care  Doctor   Email your healthcare team securely and privately 24/7 X X X X   Manage appointments: schedule your next appointment; view details of past/upcoming appointments X      Request prescription refills. X      View recent personal medical records, including lab and immunizations X X X X   View health record, including health history, allergies, medications X X X X   Read reports about your outpatient visits, procedures, consult and ER notes X X X X   See your discharge summary, which is a recap of your hospital and/or ER visit that includes your diagnosis, lab results, and care plan. X X       How to register for EZMove:  1. Go to  [email or web address].  2. Click on the Sign Up Now box, which takes you to the New Member Sign Up page. You will need to provide the following information:  a. Enter your EZMove Access Code exactly as it appears at the top of this page. (You will not need to use this code after you’ve completed the sign-up process. If you do not sign up before the expiration date, you must request a new code.)   b. Enter your date of birth.   c. Enter your home email address.   d. Click Submit, and follow the next screen’s instructions.  3. Create a EZMove ID. This will be your EZMove  login ID and cannot be changed, so think of one that is secure and easy to remember.  4. Create a BlueWare password. You can change your password at any time.  5. Enter your Password Reset Question and Answer. This can be used at a later time if you forget your password.   6. Enter your e-mail address. This allows you to receive e-mail notifications when new information is available in BlueWare.  7. Click Sign Up. You can now view your health information.    For assistance activating your BlueWare account, call [phone]

## 2017-07-08 NOTE — ED NOTES
Room 65    Pt c/o groin pain x 5 days as well as painful urination for 3 days.  Pt was seen at [facility] 4 days ago and CT was done and pain meds were given.      .  Chief Complaint   Patient presents with   • Groin Pain     left side x5 days   • Urinary Pain     x5 days, denies blood in urine.

## 2017-07-08 NOTE — ED AVS SNAPSHOT
7/8/2017    Norm Prabhakar  [address] [address]    Dear Norm:    [facility] wants to ensure your discharge home is safe and you or your loved ones have had all of your questions answered regarding your care after you leave the hospital.    Below is a list of resources and contact information should you have any questions regarding your hospital stay, follow-up instructions, or active medical symptoms.    Questions or Concerns Regarding… Contact   Medical Questions Related to Your Discharge  (7 days a week, 8am-5pm) Contact a Nurse Care Coordinator   [phone]   Medical Questions Not Related to Your Discharge  (24 hours a day / 7 days a week)  Contact the Nurse Health Line   [phone]    Medications or Discharge Instructions Refer to your discharge packet   or contact your [facility] Primary Care Provider   [phone]   Follow-up Appointment(s) Schedule your appointment via [employer]   or contact Scheduling [phone]   Billing Review your statement via [employer]  or contact Billing [phone]   Medical Records Review your records via [employer]   or contact Medical Records [phone]     You may receive a telephone call within two days of discharge. This call is to make certain you understand your discharge instructions and have the opportunity to have any questions answered. You can also easily access your medical information, test results and upcoming appointments via the [employer] free online health management tool. You can learn more and sign up at Modern Message/American Giant. For assistance setting up your American [employer] account, please call [phone].    Once again, we want to ensure your discharge home is safe and that you have a clear understanding of any next steps in your care. If you have any questions or concerns, please do not hesitate to contact us, we are here for you. Thank you for choosing [facility] for your healthcare needs.    Sincerely,    Your [facility] Healthcare Team

## 2017-07-08 NOTE — ED AVS SNAPSHOT
Home Care Instructions                                                                                                                Norm Prabhakar   MRN: [id number]    Department:  [facility], Emergency Dept   Date of Visit:  7/8/2017            [facility], Emergency Dept    [address] [place] [id number]    Phone:  [phone]      You were seen by     Michele Oneill M.D.      Your Diagnosis Was     Left groin pain     R10.30       These are the medications you received during your hospitalization from 07/08/2017 0354 to 07/08/2017 0741     Date/Time Order Dose Route Action    07/08/2017 0617 ketorolac (TORADOL) injection 60 mg 60 mg Intramuscular Given      Follow-up Information     1. Follow up with [facility], Emergency Dept.    Specialty:  Emergency Medicine    Why:  If symptoms worsen    Contact information    [address] [place] [id number] [phone]        2. Schedule an appointment as soon as possible for a visit with [facility].    Why:  discuss enlarged liver seen on CT scan    Contact information    [address] [facility] [address] [phone]      Medication Information     Review all of your home medications and newly ordered medications with your primary doctor and/or pharmacist as soon as possible. Follow medication instructions as directed by your doctor and/or pharmacist.     Please keep your complete medication list with you and share with your physician. Update the information when medications are discontinued, doses are changed, or new medications (including over-the-counter products) are added; and carry medication information at all times in the event of emergency situations.               Medication List      ASK your doctor about these medications        Instructions    Morning Afternoon Evening Bedtime    ALBUTEROL INH        Inhale  by mouth.                        divalproex  MG Tb24   Commonly known as:  DEPAKOTE ER        Take 3 Tabs by mouth every evening.   Dose:  1500 mg                        levothyroxine 125 MCG Tabs   Commonly known as:  SYNTHROID        Take 125 mcg by mouth Every morning on an empty stomach.   Dose:  125 mcg                        prazosin 2 MG Caps   Commonly known as:  MINIPRESS        Take 2 mg by mouth every evening.   Dose:  2 mg                        sertraline 100 MG Tabs   Commonly known as:  ZOLOFT        Take 100 mg by mouth every morning.   Dose:  100 mg                        XALATAN 0.005 % Soln   Generic drug:  latanoprost        Place 1 Drop in both eyes every evening.   Dose:  1 Drop                                Procedures and tests performed during your visit     CHLAMYDIA/GC PCR URINE OR SWAB    CT-RENAL COLIC EVALUATION(A/P W/O)    NURSING COMMUNICATION    POC UA        Discharge Instructions       Pain Without a Known Cause  WHAT IS PAIN WITHOUT A KNOWN CAUSE?  Pain can occur in any part of the body and can range from mild to severe. Sometimes no cause can be found for why you are having pain. Some types of pain that can occur without a known cause include:   · Headache.  · Back pain.  · Abdominal pain.  · Neck pain.  HOW IS PAIN WITHOUT A KNOWN CAUSE DIAGNOSED?   Your health care provider will try to find the cause of your pain. This may include:  · Physical exam.  · Medical history.  · Blood tests.  · Urine tests.  · X-rays.  If no cause is found, your health care provider may diagnose you with pain without a known cause.   IS THERE TREATMENT FOR PAIN WITHOUT A CAUSE?   Treatment depends on the kind of pain you have. Your health care provider may prescribe medicines to help relieve your pain.   WHAT CAN I DO AT HOME FOR MY PAIN?   · Take medicines only as directed by your health care provider.  · Stop any activities that cause pain. During periods of severe pain, bed rest may help.  · Try to reduce your stress with  activities such as yoga or meditation. Talk to your health care provider for other stress-reducing activity recommendations.  · Exercise regularly, if approved by your health care provider.  · Eat a healthy diet that includes fruits and vegetables. This may improve pain. Talk to your health care provider if you have any questions about your diet.  WHAT IF MY PAIN DOES NOT GET BETTER?   If you have a painful condition and no reason can be found for the pain or the pain gets worse, it is important to follow up with your health care provider. It may be necessary to repeat tests and look further for a possible cause.      This information is not intended to replace advice given to you by your health care provider. Make sure you discuss any questions you have with your health care provider.     Document Released: 09/12/2002 Document Revised: 01/08/2016 Document Reviewed: 05/05/2015  [employer] Interactive Patient Education ©2016 [employer].        Hepatomegaly  Hepatomegaly is when the liver is larger than normal or is enlarged. Some health problems can cause the liver to get bigger. Some people have an enlarged liver, but they do not know it.  CAUSES  This condition may be caused by:  · Cirrhosis. This is long-term (chronic) liver damage that is often caused by drinking too much alcohol. Cirrhosis is also caused by metabolic disease, infection, and some drugs.  · Hepatitis. This is an infection of the liver.  · Fatty liver disease.  · Conditions that cause minerals or trace elements to accumulate in the liver, such as Carlos disease.  · Cancer. The disease may start in the liver, or cancer may start somewhere else in the body and spread to the liver.  · Heart or blood vessel disease. These can cause hepatomegaly if blood backs up into the liver.  The cause may also not be known.  RISK FACTORS  This condition is more likely to develop in people who:  · Drink too much alcohol.  · Have diabetes.  · Are obese.  · Use any  tobacco products, including cigarettes, chewing tobacco, and e-cigarettes.  SYMPTOMS  Symptoms of this condition include:  · Abdominal pain on the right side.  · Fatigue.  · Loss of appetite.  · Nausea.  · Vomiting.  · Yellowing of the skin and the whites of the eyes (jaundice).  In some cases, there are no symptoms.  DIAGNOSIS  This condition may be diagnosed with a medical history and physical exam. Your health care provider may press on the right side of your abdomen to feel your liver. This is a way to check if the edge of your liver sticks out below your rib cage. You may also have other tests, including:  · Blood tests. These tests check whether your liver is working like it should. They also check for infection.  · Imaging tests, such as:  ¨ CT scan. This is an X-ray that is guided by a computer.  ¨ MRI. This creates pictures by using magnets and a computer.  ¨ An ultrasound. This test uses sound waves to make images.  ¨ Liver biopsy. A small sample of liver tissue is removed to be examined under a microscope.  TREATMENT  Treatment of this condition depends on what is causing it.  HOME CARE INSTRUCTIONS  What you need to do at home depends on what is causing the condition. In general:  · Take over-the-counter and prescription medicines only as told by your health care provider.  · Do not start taking any new medicine unless your health care provider has approved. These include over-the-counter medicines, supplements, and herbal remedies. Some of these can hurt your liver.  · Maintain a healthy weight.  · Follow a healthy diet. Eat plenty of fruit, vegetables, and whole grains.  · Do not drink alcohol.  · Do not use any tobacco products, including cigarettes, chewing tobacco, and e-cigarettes. If you need help quitting, ask your health care provider.  · Keep all follow-up visits as told by your health care provider. This is important.  SEEK MEDICAL CARE IF:  · You have increased pain in your abdomen.  · You  have persistent vomiting.  · You have new symptoms.  · Your symptoms get worse.  SEEK IMMEDIATE MEDICAL CARE IF:  · You have bright red blood in your vomit, or your vomit looks like coffee grounds.  · You have chest pain.  · You have trouble breathing.     This information is not intended to replace advice given to you by your health care provider. Make sure you discuss any questions you have with your health care provider.     Document Released: 03/11/2013 Document Revised: 05/03/2016 Document Reviewed: 01/27/2016  [employer] Interactive Patient Education ©2016 [employer].              Patient Information     Patient Information    Following emergency treatment: all patient requiring follow-up care must return either to a private physician or a clinic if your condition worsens before you are able to obtain further medical attention, please return to the emergency room.     Billing Information    At [facility], we work to make the billing process streamlined for our patients.  Our Representatives are here to answer any questions you may have regarding your hospital bill.  If you have insurance coverage and have supplied your insurance information to us, we will submit a claim to your insurer on your behalf.  Should you have any questions regarding your bill, we can be reached online or by phone as follows:  Online: You are able pay your bills online or live chat with our representatives about any billing questions you may have. We are here to help Monday - Friday from 8:00am to 7:30pm and 9:00am - 12:00pm on Saturdays.  Please visit [email or web address]  for more information.   Phone:  [phone] or [phone]    Please note that your emergency physician, surgeon, pathologist, radiologist, anesthesiologist, and other specialists are not employed by [facility] and will therefore bill separately for their services.  Please contact them directly for any questions concerning their  bills at the numbers below:     Emergency Physician Services:  [phone]  [facility]:  [phone]  [facility]:  [phone]  [facility]:  [phone]    1. Your final bill may vary from the amount quoted upon discharge if all procedures are not complete at that time, or if your doctor has additional procedures of which we are not aware. You will receive an additional bill if you return to the Emergency Department at [facility] for suture removal regardless of the facility of which the sutures were placed.     2. Please arrange for settlement of this account at the emergency registration.    3. All self-pay accounts are due in full at the time of treatment.  If you are unable to meet this obligation then payment is expected within 4-5 days.     4. If you have had radiology studies (CT, X-ray, Ultrasound, MRI), you have received a preliminary result during your emergency department visit. Please contact the radiology department [phone] to receive a copy of your final result. Please discuss the Final result with your primary physician or with the follow up physician provided.     Crisis Hotline:  [place] Crisis Hotline:  1-420-KCYIFXE or 1-202.379.6472  [place] Crisis Hotline:    1-131.504.3300 or 287-484-0755         ED Discharge Follow Up Questions    1. In order to provide you with very good care, we would like to follow up with a phone call in the next few days.  May we have your permission to contact you?     YES /  NO    2. What is the best phone number to call you? (       )_____-__________    3. What is the best time to call you?      Morning  /  Afternoon  /  Evening                   Patient Signature:  ____________________________________________________________    Date:  ____________________________________________________________

## 2017-08-08 PROCEDURE — 99284 EMERGENCY DEPT VISIT MOD MDM: CPT

## 2017-08-08 ASSESSMENT — PAIN SCALES - GENERAL: PAINLEVEL_OUTOF10: 7

## 2017-08-09 ENCOUNTER — APPOINTMENT (OUTPATIENT)
Dept: RADIOLOGY | Facility: MEDICAL CENTER | Age: 37
End: 2017-08-09
Attending: EMERGENCY MEDICINE
Payer: COMMERCIAL

## 2017-08-09 ENCOUNTER — HOSPITAL ENCOUNTER (EMERGENCY)
Facility: MEDICAL CENTER | Age: 37
End: 2017-08-09
Attending: EMERGENCY MEDICINE
Payer: COMMERCIAL

## 2017-08-09 VITALS
BODY MASS INDEX: 35.66 KG/M2 | RESPIRATION RATE: 16 BRPM | DIASTOLIC BLOOD PRESSURE: 55 MMHG | HEIGHT: 78 IN | SYSTOLIC BLOOD PRESSURE: 112 MMHG | HEART RATE: 75 BPM | OXYGEN SATURATION: 95 % | TEMPERATURE: 97.4 F | WEIGHT: 308.2 LBS

## 2017-08-09 DIAGNOSIS — M79.18 MUSCULOSKELETAL PAIN: ICD-10-CM
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
## 2017-08-09 PROCEDURE — 73030 X-RAY EXAM OF SHOULDER: CPT | Mod: LT

## 2017-08-09 PROCEDURE — 73080 X-RAY EXAM OF ELBOW: CPT | Mod: LT

## 2017-08-09 PROCEDURE — 72125 CT NECK SPINE W/O DYE: CPT

## 2017-08-09 RX ORDER — NAPROXEN 500 MG/1
500 TABLET ORAL 2 TIMES DAILY WITH MEALS
Qty: 14 TAB | Refills: 0 | Status: SHIPPED | OUTPATIENT
Start: 2017-08-09 | End: 2017-08-16

## 2017-08-09 ASSESSMENT — PAIN SCALES - GENERAL: PAINLEVEL_OUTOF10: 7

## 2017-08-09 ASSESSMENT — LIFESTYLE VARIABLES: DO YOU DRINK ALCOHOL: NO

## 2017-08-09 NOTE — ED AVS SNAPSHOT
[employer] Access Code: [email or web address]  Expires: 9/8/2017  2:44 AM    [employer]  A secure, online tool to manage your health information     [employer]’s [employer]® is a secure, online tool that connects you to your personalized health information from the privacy of your home -- day or night - making it very easy for you to manage your healthcare. Once the activation process is completed, you can even access your medical information using the [employer] jodi, which is available for free in the Apple Jodi store or Google Play store.     [employer] provides the following levels of access (as shown below):   My Chart Features   [facility] Primary Care Doctor [facility]  Specialists [facility]  Urgent  Care Non-[facility]  Primary Care  Doctor   Email your healthcare team securely and privately 24/7 X X X X   Manage appointments: schedule your next appointment; view details of past/upcoming appointments X      Request prescription refills. X      View recent personal medical records, including lab and immunizations X X X X   View health record, including health history, allergies, medications X X X X   Read reports about your outpatient visits, procedures, consult and ER notes X X X X   See your discharge summary, which is a recap of your hospital and/or ER visit that includes your diagnosis, lab results, and care plan. X X       How to register for [employer]:  1. Go to  [email or web address].  2. Click on the Sign Up Now box, which takes you to the New Member Sign Up page. You will need to provide the following information:  a. Enter your [employer] Company Access Code exactly as it appears at the top of this page. (You will not need to use this code after you’ve completed the sign-up process. If you do not sign up before the expiration date, you must request a new code.)   b. Enter your date of birth.   c. Enter your home email address.   d. Click Submit, and follow the next screen’s instructions.  3. Create a [employer] Company ID. This will be your [employer]  login ID and cannot be changed, so think of one that is secure and easy to remember.  4. Create a Iterable password. You can change your password at any time.  5. Enter your Password Reset Question and Answer. This can be used at a later time if you forget your password.   6. Enter your e-mail address. This allows you to receive e-mail notifications when new information is available in Iterable.  7. Click Sign Up. You can now view your health information.    For assistance activating your [employer]able account, call [phone]

## 2017-08-09 NOTE — DISCHARGE INSTRUCTIONS
Musculoskeletal Pain  Musculoskeletal pain is muscle and [place] aches and pains. These pains can occur in any part of the body. Your caregiver may treat you without knowing the cause of the pain. They may treat you if blood or urine tests, X-rays, and other tests were normal.   CAUSES  There is often not a definite cause or reason for these pains. These pains may be caused by a type of germ (virus). The discomfort may also come from overuse. Overuse includes working out too hard when your body is not fit. [place] aches also come from weather changes. Bone is sensitive to atmospheric pressure changes.  HOME CARE INSTRUCTIONS   · Ask when your test results will be ready. Make sure you get your test results.  · Only take over-the-counter or prescription medicines for pain, discomfort, or fever as directed by your caregiver. If you were given medications for your condition, do not drive, operate machinery or power tools, or sign legal documents for 24 hours. Do not drink alcohol. Do not take sleeping pills or other medications that may interfere with treatment.  · Continue all activities unless the activities cause more pain. When the pain lessens, slowly resume normal activities. Gradually increase the intensity and duration of the activities or exercise.  · During periods of severe pain, bed rest may be helpful. Lay or sit in any position that is comfortable.  · Putting ice on the injured area.  ¨ Put ice in a bag.  ¨ Place a towel between your skin and the bag.  ¨ Leave the ice on for 15 to 20 minutes, 3 to 4 times a day.  · Follow up with your caregiver for continued problems and no reason can be found for the pain. If the pain becomes worse or does not go away, it may be necessary to repeat tests or do additional testing. Your caregiver may need to look further for a possible cause.  SEEK IMMEDIATE MEDICAL CARE IF:  · You have pain that is getting worse and is not relieved by medications.  · You develop chest pain  that is associated with shortness or breath, sweating, feeling sick to your stomach (nauseous), or throw up (vomit).  · Your pain becomes localized to the abdomen.  · You develop any new symptoms that seem different or that concern you.  MAKE SURE YOU:   · Understand these instructions.  · Will watch your condition.  · Will get help right away if you are not doing well or get worse.     This information is not intended to replace advice given to you by your health care provider. Make sure you discuss any questions you have with your health care provider.     Document Released: 12/18/2006 Document Revised: 03/11/2013 Document Reviewed: 08/22/2014  [employer] Interactive Patient Education ©2016 [employer] Inc.

## 2017-08-09 NOTE — ED NOTES
"Chief Complaint   Patient presents with   • Arm Injury   • Shoulder Injury   • Neck Pain     Patient ambulatory to triage. States that he slipped and fell at work. Landed on his left elbow and the back of his head. Patient has pain to his left elbow, shoulder, neck tenderness, and back of his head. c-collar applied in triage. Patient denies loc. /74 mmHg  Pulse 77  Temp(Src) 36.3 °C (97.4 °F)  Resp 16  Ht 1.981 m (6' 5.99\")  Wt 139.8 kg (308 lb 3.3 oz)  BMI 35.62 kg/m2  SpO2 95%    "

## 2017-08-09 NOTE — ED AVS SNAPSHOT
8/9/2017    Norm Prabhakar  [address] [address]    Dear Norm:    [facility] wants to ensure your discharge home is safe and you or your loved ones have had all of your questions answered regarding your care after you leave the hospital.    Below is a list of resources and contact information should you have any questions regarding your hospital stay, follow-up instructions, or active medical symptoms.    Questions or Concerns Regarding… Contact   Medical Questions Related to Your Discharge  (7 days a week, 8am-5pm) Contact a Nurse Care Coordinator   [phone]   Medical Questions Not Related to Your Discharge  (24 hours a day / 7 days a week)  Contact the Nurse Health Line   [phone]    Medications or Discharge Instructions Refer to your discharge packet   or contact your [facility] Primary Care Provider   [phone]   Follow-up Appointment(s) Schedule your appointment via [employer]   or contact Scheduling [phone]   Billing Review your statement via [employer]  or contact Billing [phone]   Medical Records Review your records via [employer]   or contact Medical Records [phone]     You may receive a telephone call within two days of discharge. This call is to make certain you understand your discharge instructions and have the opportunity to have any questions answered. You can also easily access your medical information, test results and upcoming appointments via the [employer] free online health management tool. You can learn more and sign up at [employer]. For assistance setting up your [employer] account, please call [phone].    Once again, we want to ensure your discharge home is safe and that you have a clear understanding of any next steps in your care. If you have any questions or concerns, please do not hesitate to contact us, we are here for you. Thank you for choosing [facility] for your healthcare needs.    Sincerely,    Your [facility] Healthcare Team

## 2017-08-09 NOTE — ED NOTES
Assumed care of patient. Patient complains of neck pain with left arm and shoulder pain secondary to slipping and falling on a wet floor while at work tonight.  Patient alert and oriented x 4. Patient denies any other complaints at this time. Patient side rails up x 1 and call bell within reach.

## 2017-08-09 NOTE — ED AVS SNAPSHOT
Home Care Instructions                                                                                                                Norm Prabhakar   MRN: [id number]    Department:  [facility], Emergency Dept   Date of Visit:  8/8/2017            [facility], Emergency Dept    [address] [address]    Phone:  [phone]      You were seen by     Dorian Corona M.D.      Your Diagnosis Was     Fall, initial encounter     W19.XXXA       Follow-up Information     1. Follow up with Long Linares M.D..    Specialty:  Family Medicine    Contact information    [address]  [address]  [facility] [address] [phone]          2. Follow up with [facility], Emergency Dept.    Specialty:  Emergency Medicine    Why:  As needed, If symptoms worsen    Contact information    [address] [address] [phone]        3. Follow up with [facility].    Contact information    [address] [facility] [address] [phone]        Medication Information     Review all of your home medications and newly ordered medications with your primary doctor and/or pharmacist as soon as possible. Follow medication instructions as directed by your doctor and/or pharmacist.     Please keep your complete medication list with you and share with your physician. Update the information when medications are discontinued, doses are changed, or new medications (including over-the-counter products) are added; and carry medication information at all times in the event of emergency situations.               Medication List      START taking these medications        Instructions    Morning Afternoon Evening Bedtime    naproxen 500 MG Tabs   Commonly known as:  NAPROSYN        Take 1 Tab by mouth 2 times a day, with meals for 7 days.   Dose:  500 mg                          ASK your doctor about these medications        Instructions    Morning Afternoon  Evening Bedtime    ALBUTEROL INH        Inhale  by mouth.                        divalproex  MG Tb24   Commonly known as:  DEPAKOTE ER        Take 3 Tabs by mouth every evening.   Dose:  1500 mg                        levothyroxine 125 MCG Tabs   Commonly known as:  SYNTHROID        Take 125 mcg by mouth Every morning on an empty stomach.   Dose:  125 mcg                        prazosin 2 MG Caps   Commonly known as:  MINIPRESS        Take 2 mg by mouth every evening.   Dose:  2 mg                        sertraline 100 MG Tabs   Commonly known as:  ZOLOFT        Take 100 mg by mouth every morning.   Dose:  100 mg                        XALATAN 0.005 % Soln   Generic drug:  latanoprost        Place 1 Drop in both eyes every evening.   Dose:  1 Drop                             Where to Get Your Medications      You can get these medications from any pharmacy     Bring a paper prescription for each of these medications    - naproxen 500 MG Tabs            Procedures and tests performed during your visit     CT-CSPINE WITHOUT PLUS RECONS    DX-ELBOW-COMPLETE 3+ LEFT    DX-SHOULDER 2+ LEFT        Discharge Instructions       Musculoskeletal Pain  Musculoskeletal pain is muscle and [place] aches and pains. These pains can occur in any part of the body. Your caregiver may treat you without knowing the cause of the pain. They may treat you if blood or urine tests, X-rays, and other tests were normal.   CAUSES  There is often not a definite cause or reason for these pains. These pains may be caused by a type of germ (virus). The discomfort may also come from overuse. Overuse includes working out too hard when your body is not fit. [place] aches also come from weather changes. Bone is sensitive to atmospheric pressure changes.  HOME CARE INSTRUCTIONS   · Ask when your test results will be ready. Make sure you get your test results.  · Only take over-the-counter or prescription medicines for pain, discomfort, or fever as  directed by your caregiver. If you were given medications for your condition, do not drive, operate machinery or power tools, or sign legal documents for 24 hours. Do not drink alcohol. Do not take sleeping pills or other medications that may interfere with treatment.  · Continue all activities unless the activities cause more pain. When the pain lessens, slowly resume normal activities. Gradually increase the intensity and duration of the activities or exercise.  · During periods of severe pain, bed rest may be helpful. Lay or sit in any position that is comfortable.  · Putting ice on the injured area.  ¨ Put ice in a bag.  ¨ Place a towel between your skin and the bag.  ¨ Leave the ice on for 15 to 20 minutes, 3 to 4 times a day.  · Follow up with your caregiver for continued problems and no reason can be found for the pain. If the pain becomes worse or does not go away, it may be necessary to repeat tests or do additional testing. Your caregiver may need to look further for a possible cause.  SEEK IMMEDIATE MEDICAL CARE IF:  · You have pain that is getting worse and is not relieved by medications.  · You develop chest pain that is associated with shortness or breath, sweating, feeling sick to your stomach (nauseous), or throw up (vomit).  · Your pain becomes localized to the abdomen.  · You develop any new symptoms that seem different or that concern you.  MAKE SURE YOU:   · Understand these instructions.  · Will watch your condition.  · Will get help right away if you are not doing well or get worse.     This information is not intended to replace advice given to you by your health care provider. Make sure you discuss any questions you have with your health care provider.     Document Released: 12/18/2006 Document Revised: 03/11/2013 Document Reviewed: 08/22/2014  Elsevier Interactive Patient Education ©2016 [employer]            Patient Information     Patient Information    Following emergency treatment:  all patient requiring follow-up care must return either to a private physician or a clinic if your condition worsens before you are able to obtain further medical attention, please return to the emergency room.     Billing Information    At [facility], we work to make the billing process streamlined for our patients.  Our Representatives are here to answer any questions you may have regarding your hospital bill.  If you have insurance coverage and have supplied your insurance information to us, we will submit a claim to your insurer on your behalf.  Should you have any questions regarding your bill, we can be reached online or by phone as follows:  Online: You are able pay your bills online or live chat with our representatives about any billing questions you may have. We are here to help Monday - Friday from 8:00am to 7:30pm and 9:00am - 12:00pm on Saturdays.  Please visit [email or web address]  for more information.   Phone:  [phone] or [phone]    Please note that your emergency physician, surgeon, pathologist, radiologist, anesthesiologist, and other specialists are not employed by [facility] and will therefore bill separately for their services.  Please contact them directly for any questions concerning their bills at the numbers below:     Emergency Physician Services:  [phone]  [facility]:  [phone]  [facility]:  [phone]  [facility]:  [phone]    1. Your final bill may vary from the amount quoted upon discharge if all procedures are not complete at that time, or if your doctor has additional procedures of which we are not aware. You will receive an additional bill if you return to the Emergency Department at [facility] for suture removal regardless of the facility of which the sutures were placed.     2. Please arrange for settlement of this account at the emergency registration.    3. All  self-pay accounts are due in full at the time of treatment.  If you are unable to meet this obligation then payment is expected within 4-5 days.     4. If you have had radiology studies (CT, X-ray, Ultrasound, MRI), you have received a preliminary result during your emergency department visit. Please contact the radiology department [phone] to receive a copy of your final result. Please discuss the Final result with your primary physician or with the follow up physician provided.     Crisis Hotline:  [place] Crisis Hotline:  4-670-ZJWKVDZ or 1-647.930.8910  [place] Crisis Hotline:    1-687.826.2284 or 673-256-6838         ED Discharge Follow Up Questions    1. In order to provide you with very good care, we would like to follow up with a phone call in the next few days.  May we have your permission to contact you?     YES /  NO    2. What is the best phone number to call you? (       )_____-__________    3. What is the best time to call you?      Morning  /  Afternoon  /  Evening                   Patient Signature:  ____________________________________________________________    Date:  ____________________________________________________________

## 2017-08-09 NOTE — LETTER
"  FORM C-4:  EMPLOYEE’S CLAIM FOR COMPENSATION/ REPORT OF INITIAL TREATMENT  EMPLOYEE’S CLAIM - PROVIDE ALL INFORMATION REQUESTED   First Name  Norm Last Name  Aki Birthdate             Age  1980 37 y.o. Sex  male Claim Number   Home Employee Address  [address]                                     [place]  [address] Height  1.981 m (6' 5.99\") Weight  139.8 kg (308 lb 3.3 oz) [facility]     Mailing Employee Address                           [address]               Zip  [address] Telephone  [phone] (home)  Primary Language Spoken  ENGLISH   Insurer  Employers Compensation ins Third Party   Unable to obtain Employee's Occupation (Job Title) When Injury or Occupational Disease Occurred     Employer's Name  [employer]  Telephone   [phone]    Employer Address  [address]] Zip  [address]   Date of Injury  8/9/2017       Hour of Injury  12:00 PM Date Employer Notified  8/09/2017 Last Day of Work after Injury or Occupational Disease  8/8/2017 Supervisor to Whom Injury Reported  Unknown   Address or Location of Accident (if applicable)  [[address] 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 Be specific and answer in detail. 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   If you believe that you have an occupational disease, when did you first have knowledge of the disability and it relationship to your employment?  N/A Witnesses to the Accident  N/A     Nature of Injury or Occupational Disease  Workers' Compensation  Part(s) of Body Injured or Affected  Lower Arm (L), Upper Arm (L), Skull    I certify that the above is true and correct to the best of my knowledge and that I have provided this information in order to obtain the benefits of [place]’s " [place]  I hereby authorize any physician, chiropractor, surgeon, practitioner, or other person, any hospital, including [facility], any medical service organization, any insurance company, or other institution or organization to release to each other, any medical or other information, including benefits paid or payable, pertinent to this injury or disease, except information relative to diagnosis, treatment and/or counseling for AIDS, psychological conditions, alcohol or controlled substances, for which I must give specific authorization.  A Photostat of this authorization shall be as valid as the original.   Date  08/09/2017 Place  [facility] Employee’s Signature   THIS REPORT MUST BE COMPLETED AND MAILED WITHIN 3 WORKING DAYS OF TREATMENT   Place  [facility], EMERGENCY DEPT  Name of Facility   [facility]   Date  8/8/2017 Diagnosis  (W19.XXXA) Fall, initial encounter  (M79.1) Musculoskeletal pain Is there evidence the injured employee was under the influence of alcohol and/or another controlled substance at the time of accident?   Hour  2:38 AM Description of Injury or Disease  Fall, initial encounter  Musculoskeletal pain No   Treatment  Pain management  Have you advised the patient to remain off work five days or more?         No   X-Ray Findings  Negative   If Yes   From Date    To Date      From information given by the employee, together with medical evidence, can you directly connect this injury or occupational disease as job incurred?  Yes If No, is the employee capable of: Full Duty  No Modified Duty  Yes   Is additional medical care by a physician indicated?  Yes If Modified Duty, Specify any Limitations / Restrictions  No heavy lifting until cleared     Do you know of any previous injury or disease contributing to this condition  "or occupational disease?  No   Date  8/9/2017 Print Doctor’s Name  Dorian Corona I certify the employer’s copy of this form was mailed on:   Address  [address] [address] [phone] Insurer’s Use Only   [facility]  [address]    Provider’s Tax ID Number  [id number] Telephone  Dept: [phone]    Doctor’s Signature  e-DORIAN Castro M.D. Degree   M.D.    Original - TREATING PHYSICIAN OR CHIROPRACTOR   Pg 2-Insurer/TPA   Pg 3-Employer   Pg 4-Employee                                                                                                  Form C-4 (rev01/03)     BRIEF DESCRIPTION OF RIGHTS AND BENEFITS  (Pursuant to [place] 616C.[place])    Notice of Injury or Occupational Disease (Incident Report Form C-1): If an injury or occupational disease (OD) arises out of and in the course of employment, you must provide written notice to your employer as soon as practicable, but no later than 7 days after the accident or OD. Your employer shall maintain a sufficient supply of the required forms.    Claim for Compensation (Form C-4): If medical treatment is sought, the form C-4 is available at the place of initial treatment. A completed \"Claim for Compensation\" (Form C-4) must be filed within 90 days after an accident or OD. The treating physician or chiropractor must, within 3 working days after treatment, complete and mail to the employer, the employer's insurer and third-party , the Claim for Compensation.    Medical Treatment: If you require medical treatment for your on-the-job injury or OD, you may be required to select a physician or chiropractor from a list provided by your workers’ compensation insurer, if it has contracted with an Organization for Managed Care (MCO) or Preferred Provider Organization (PPO) or providers of health care. If your employer has not entered into a contract with an MCO or PPO, you may select a physician or chiropractor from the Panel of " Physicians and Chiropractors. Any medical costs related to your industrial injury or OD will be paid by your insurer.    Temporary Total Disability (TTD): If your doctor has certified that you are unable to work for a period of at least 5 consecutive days, or 5 cumulative days in a 20-day period, or places restrictions on you that your employer does not accommodate, you may be entitled to TTD compensation.    Temporary Partial Disability (TPD): If the wage you receive upon reemployment is less than the compensation for TTD to which you are entitled, the insurer may be required to pay you TPD compensation to make up the difference. TPD can only be paid for a maximum of 24 months.    Permanent Partial Disability (PPD): When your medical condition is stable and there is an indication of a PPD as a result of your injury or OD, within 30 days, your insurer must arrange for an evaluation by a rating physician or chiropractor to determine the degree of your PPD. The amount of your PPD award depends on the date of injury, the results of the PPD evaluation and your age and wage.    Permanent Total Disability (PTD): If you are medically certified by a treating physician or chiropractor as permanently and totally disabled and have been granted a PTD status by your insurer, you are entitled to receive monthly benefits not to exceed 66 2/3% of your average monthly wage. The amount of your PTD payments is subject to reduction if you previously received a PPD award.    Vocational Rehabilitation Services: You may be eligible for vocational rehabilitation services if you are unable to return to the job due to a permanent physical impairment or permanent restrictions as a result of your injury or occupational disease.    Transportation and Per Stephon Reimbursement: You may be eligible for travel expenses and per stephon associated with medical treatment.  Reopening: You may be able to reopen your claim if your condition worsens after  claim closure.    Appeal Process: If you disagree with a written determination issued by the insurer or the insurer does not respond to your request, you may appeal to the Department of Administration, , by following the instructions contained in your determination letter. You must appeal the determination within 70 days from the date of the determination letter at [address], [address], [place], [place] [address], or [address], [address], [place], [place] [address]. If you disagree with the  decision, you may appeal to the Department of Administration, . You must file your appeal within 30 days from the date of the  decision letter at [address], [address], [place], [place] [address], or [address] [address], [place], [place] [address]. If you disagree with a decision of an , you may file a petition for judicial review with the District Court. You must do so within 30 days of the Appeal Officer’s decision. You may be represented by an  at your own expense or you may contact the [facility] for possible representation.    [place]  for Injured Workers (NAIW): If you disagree with a  decision, you may request that NAIW represent you without charge at an  Hearing. For information regarding denial of benefits, you may contact the NA at: [address], [address], [place] [address], [phone], or [address] [facility] [facility], [place] [address], [phone]    To File a Complaint with the Division: If you wish to file a complaint with the  of the Division of Industrial Relations (DIR), please contact the Workers’ Compensation Section, [address], [place], [place] [address], telephone [phone]) [phone], or [address], [place] [address], telephone [phone].    For assistance  with Workers’ Compensation Issues: you may contact the Office of the Governor Consumer Health Assistance, [address] [place] [address], [facility] [address], [place], [place] [address], Toll Free 1-972.753.9543, Web site: http://govcha.[facility].nv., E-mail [email or web address].[email or web address].                                                                                                                                                                               __________________________________________________________________                                 08/09/2017            Employee Name / Signature                                                                                                                            Date                                       D-2 (rev. 10/07)

## 2017-08-11 ENCOUNTER — HOSPITAL ENCOUNTER (EMERGENCY)
Facility: MEDICAL CENTER | Age: 37
End: 2017-08-11
Attending: EMERGENCY MEDICINE
Payer: MEDICAID

## 2017-08-11 ENCOUNTER — APPOINTMENT (OUTPATIENT)
Dept: RADIOLOGY | Facility: MEDICAL CENTER | Age: 37
End: 2017-08-11
Attending: EMERGENCY MEDICINE
Payer: MEDICAID

## 2017-08-11 VITALS
SYSTOLIC BLOOD PRESSURE: 131 MMHG | TEMPERATURE: 97.9 F | HEIGHT: 77 IN | BODY MASS INDEX: 36.6 KG/M2 | DIASTOLIC BLOOD PRESSURE: 76 MMHG | OXYGEN SATURATION: 96 % | WEIGHT: 310 LBS | HEART RATE: 64 BPM | RESPIRATION RATE: 19 BRPM

## 2017-08-11 DIAGNOSIS — R79.89 ELEVATED TSH: ICD-10-CM

## 2017-08-11 DIAGNOSIS — Z91.148 NON COMPLIANCE W MEDICATION REGIMEN: ICD-10-CM

## 2017-08-11 DIAGNOSIS — R42 DIZZINESS: ICD-10-CM

## 2017-08-11 LAB
ALBUMIN SERPL BCP-MCNC: 4.3 G/DL (ref 3.2–4.9)
ALBUMIN/GLOB SERPL: 1.9 G/DL
ALP SERPL-CCNC: 50 U/L (ref 30–99)
ALT SERPL-CCNC: 17 U/L (ref 2–50)
AMMONIA PLAS-SCNC: <10 UMOL/L (ref 11–45)
ANION GAP SERPL CALC-SCNC: 8 MMOL/L (ref 0–11.9)
AST SERPL-CCNC: 22 U/L (ref 12–45)
BASOPHILS # BLD AUTO: 1.1 % (ref 0–1.8)
BASOPHILS # BLD: 0.11 K/UL (ref 0–0.12)
BILIRUB SERPL-MCNC: 0.3 MG/DL (ref 0.1–1.5)
BUN SERPL-MCNC: 25 MG/DL (ref 8–22)
CALCIUM SERPL-MCNC: 9.7 MG/DL (ref 8.5–10.5)
CHLORIDE SERPL-SCNC: 105 MMOL/L (ref 96–112)
CO2 SERPL-SCNC: 25 MMOL/L (ref 20–33)
CREAT SERPL-MCNC: 1.01 MG/DL (ref 0.5–1.4)
EOSINOPHIL # BLD AUTO: 0.15 K/UL (ref 0–0.51)
EOSINOPHIL NFR BLD: 1.5 % (ref 0–6.9)
ERYTHROCYTE [DISTWIDTH] IN BLOOD BY AUTOMATED COUNT: 44.6 FL (ref 35.9–50)
GFR SERPL CREATININE-BSD FRML MDRD: >60 ML/MIN/1.73 M 2
GLOBULIN SER CALC-MCNC: 2.3 G/DL (ref 1.9–3.5)
GLUCOSE SERPL-MCNC: 122 MG/DL (ref 65–99)
HCT VFR BLD AUTO: 38.8 % (ref 42–52)
HGB BLD-MCNC: 13 G/DL (ref 14–18)
IMM GRANULOCYTES # BLD AUTO: 0.09 K/UL (ref 0–0.11)
IMM GRANULOCYTES NFR BLD AUTO: 0.9 % (ref 0–0.9)
LYMPHOCYTES # BLD AUTO: 2.85 K/UL (ref 1–4.8)
LYMPHOCYTES NFR BLD: 28 % (ref 22–41)
MAGNESIUM SERPL-MCNC: 2.1 MG/DL (ref 1.5–2.5)
MCH RBC QN AUTO: 30.4 PG (ref 27–33)
MCHC RBC AUTO-ENTMCNC: 33.5 G/DL (ref 33.7–35.3)
MCV RBC AUTO: 90.9 FL (ref 81.4–97.8)
MONOCYTES # BLD AUTO: 0.98 K/UL (ref 0–0.85)
MONOCYTES NFR BLD AUTO: 9.6 % (ref 0–13.4)
NEUTROPHILS # BLD AUTO: 6 K/UL (ref 1.82–7.42)
NEUTROPHILS NFR BLD: 58.9 % (ref 44–72)
NRBC # BLD AUTO: 0 K/UL
NRBC BLD AUTO-RTO: 0 /100 WBC
PHOSPHATE SERPL-MCNC: 2.9 MG/DL (ref 2.5–4.5)
PLATELET # BLD AUTO: 307 K/UL (ref 164–446)
PMV BLD AUTO: 10.5 FL (ref 9–12.9)
POTASSIUM SERPL-SCNC: 4.2 MMOL/L (ref 3.6–5.5)
PROT SERPL-MCNC: 6.6 G/DL (ref 6–8.2)
RBC # BLD AUTO: 4.27 M/UL (ref 4.7–6.1)
SODIUM SERPL-SCNC: 138 MMOL/L (ref 135–145)
T4 FREE SERPL-MCNC: 0.62 NG/DL (ref 0.53–1.43)
TROPONIN I SERPL-MCNC: <0.01 NG/ML (ref 0–0.04)
TSH SERPL DL<=0.005 MIU/L-ACNC: 15.13 UIU/ML (ref 0.3–3.7)
WBC # BLD AUTO: 10.2 K/UL (ref 4.8–10.8)

## 2017-08-11 PROCEDURE — 700102 HCHG RX REV CODE 250 W/ 637 OVERRIDE(OP): Performed by: EMERGENCY MEDICINE

## 2017-08-11 PROCEDURE — 84439 ASSAY OF FREE THYROXINE: CPT

## 2017-08-11 PROCEDURE — 80053 COMPREHEN METABOLIC PANEL: CPT

## 2017-08-11 PROCEDURE — 84484 ASSAY OF TROPONIN QUANT: CPT

## 2017-08-11 PROCEDURE — 93005 ELECTROCARDIOGRAM TRACING: CPT | Performed by: EMERGENCY MEDICINE

## 2017-08-11 PROCEDURE — 85025 COMPLETE CBC W/AUTO DIFF WBC: CPT

## 2017-08-11 PROCEDURE — 84443 ASSAY THYROID STIM HORMONE: CPT

## 2017-08-11 PROCEDURE — A9270 NON-COVERED ITEM OR SERVICE: HCPCS | Performed by: EMERGENCY MEDICINE

## 2017-08-11 PROCEDURE — 82140 ASSAY OF AMMONIA: CPT

## 2017-08-11 PROCEDURE — 70551 MRI BRAIN STEM W/O DYE: CPT

## 2017-08-11 PROCEDURE — 700105 HCHG RX REV CODE 258: Performed by: EMERGENCY MEDICINE

## 2017-08-11 PROCEDURE — 99284 EMERGENCY DEPT VISIT MOD MDM: CPT

## 2017-08-11 PROCEDURE — 83735 ASSAY OF MAGNESIUM: CPT

## 2017-08-11 PROCEDURE — 84100 ASSAY OF PHOSPHORUS: CPT

## 2017-08-11 RX ORDER — LEVOTHYROXINE SODIUM 0.12 MG/1
125 TABLET ORAL ONCE
Status: COMPLETED | OUTPATIENT
Start: 2017-08-11 | End: 2017-08-11

## 2017-08-11 RX ORDER — SODIUM CHLORIDE 9 MG/ML
1000 INJECTION, SOLUTION INTRAVENOUS ONCE
Status: COMPLETED | OUTPATIENT
Start: 2017-08-11 | End: 2017-08-11

## 2017-08-11 RX ORDER — SERTRALINE HYDROCHLORIDE 100 MG/1
100 TABLET, FILM COATED ORAL EVERY MORNING
Qty: 30 TAB | Refills: 0 | Status: SHIPPED | OUTPATIENT
Start: 2017-08-11 | End: 2018-07-27

## 2017-08-11 RX ORDER — LEVOTHYROXINE SODIUM 0.12 MG/1
125 TABLET ORAL
Qty: 30 TAB | Refills: 0 | Status: SHIPPED | OUTPATIENT
Start: 2017-08-11 | End: 2018-12-31

## 2017-08-11 RX ORDER — SERTRALINE HYDROCHLORIDE 100 MG/1
100 TABLET, FILM COATED ORAL ONCE
Status: COMPLETED | OUTPATIENT
Start: 2017-08-11 | End: 2017-08-11

## 2017-08-11 RX ADMIN — SERTRALINE 100 MG: 100 TABLET, FILM COATED ORAL at 17:35

## 2017-08-11 RX ADMIN — LEVOTHYROXINE SODIUM 125 MCG: 125 TABLET ORAL at 18:30

## 2017-08-11 RX ADMIN — SODIUM CHLORIDE 1000 ML: 9 INJECTION, SOLUTION INTRAVENOUS at 17:05

## 2017-08-11 ASSESSMENT — ENCOUNTER SYMPTOMS
FEVER: 0
VOMITING: 0
MYALGIAS: 0
LIGHT-HEADEDNESS: 1
COUGH: 0
FATIGUE: 0
NAUSEA: 0
SHORTNESS OF BREATH: 0
WEAKNESS: 1
CONFUSION: 0
ABDOMINAL PAIN: 0
SORE THROAT: 0

## 2017-08-11 ASSESSMENT — PAIN SCALES - GENERAL: PAINLEVEL_OUTOF10: 0

## 2017-08-11 NOTE — ED AVS SNAPSHOT
TopOPPS Access Code: [email or web address]  Expires: 9/8/2017  2:44 AM    TopOPPS  A secure, online tool to manage your health information     [employer] TopOPPS® is a secure, online tool that connects you to your personalized health information from the privacy of your home -- day or night - making it very easy for you to manage your healthcare. Once the activation process is completed, you can even access your medical information using the TopOPPS jodi, which is available for free in the Apple Jodi store or Google Play store.     TopOPPS provides the following levels of access (as shown below):   My Chart Features   [facility] Primary Care Doctor [facility]  Specialists [facility]  Urgent  Care Non-[facility]  Primary Care  Doctor   Email your healthcare team securely and privately 24/7 X X X X   Manage appointments: schedule your next appointment; view details of past/upcoming appointments X      Request prescription refills. X      View recent personal medical records, including lab and immunizations X X X X   View health record, including health history, allergies, medications X X X X   Read reports about your outpatient visits, procedures, consult and ER notes X X X X   See your discharge summary, which is a recap of your hospital and/or ER visit that includes your diagnosis, lab results, and care plan. X X       How to register for TopOPPS:  1. Go to  [email or web address].  2. Click on the Sign Up Now box, which takes you to the New Member Sign Up page. You will need to provide the following information:  a. Enter your TopOPPS Access Code exactly as it appears at the top of this page. (You will not need to use this code after you’ve completed the sign-up process. If you do not sign up before the expiration date, you must request a new code.)   b. Enter your date of birth.   c. Enter your home email address.   d. Click Submit, and follow the next screen’s instructions.  3. Create a TopOPPS ID. This will be your TopOPPS  login ID and cannot be changed, so think of one that is secure and easy to remember.  4. Create a [facility] password. You can change your password at any time.  5. Enter your Password Reset Question and Answer. This can be used at a later time if you forget your password.   6. Enter your e-mail address. This allows you to receive e-mail notifications when new information is available in [facility].  7. Click Sign Up. You can now view your health information.    For assistance activating your [facility] account, call [phone]

## 2017-08-11 NOTE — ED AVS SNAPSHOT
8/11/2017    Norm Prabhakar  [address] [address]    Dear Norm:    [facility] wants to ensure your discharge home is safe and you or your loved ones have had all of your questions answered regarding your care after you leave the hospital.    Below is a list of resources and contact information should you have any questions regarding your hospital stay, follow-up instructions, or active medical symptoms.    Questions or Concerns Regarding… Contact   Medical Questions Related to Your Discharge  (7 days a week, 8am-5pm) Contact a Nurse Care Coordinator   [phone]   Medical Questions Not Related to Your Discharge  (24 hours a day / 7 days a week)  Contact the Nurse Health Line   [phone]    Medications or Discharge Instructions Refer to your discharge packet   or contact your [facility] Primary Care Provider   [phone]   Follow-up Appointment(s) Schedule your appointment via [employer]   or contact Scheduling [phone]   Billing Review your statement via [employer]  or contact Billing [phone]   Medical Records Review your records via [employer]   or contact Medical Records [phone]     You may receive a telephone call within two days of discharge. This call is to make certain you understand your discharge instructions and have the opportunity to have any questions answered. You can also easily access your medical information, test results and upcoming appointments via the [employer] free online health management tool. You can learn more and sign up at [email or web address]. For assistance setting up your [employer] account, please call [phone].    Once again, we want to ensure your discharge home is safe and that you have a clear understanding of any next steps in your care. If you have any questions or concerns, please do not hesitate to contact us, we are here for you. Thank you for choosing [facility] for your healthcare needs.    Sincerely,    Your [facility] Healthcare Team

## 2017-08-11 NOTE — ED AVS SNAPSHOT
Home Care Instructions                                                                                                                Norm Prabhakar   MRN: [id number]    Department:  [facility], Emergency Dept   Date of Visit:  8/11/2017            [facility], Emergency Dept    [address] [place] [id number]    Phone:  [phone]      You were seen by     Jacinta Siu M.D.      Your Diagnosis Was     Dizziness     R42       These are the medications you received during your hospitalization from 08/11/2017 1628 to 08/11/2017 2217     Date/Time Order Dose Route Action    08/11/2017 1705 NS infusion 1,000 mL 1,000 mL Intravenous New Bag    08/11/2017 1830 levothyroxine (SYNTHROID) tablet 125 mcg 125 mcg Oral Given    08/11/2017 1735 sertraline (ZOLOFT) tablet 100 mg 100 mg Oral Given      Follow-up Information     1. Schedule an appointment as soon as possible for a visit with Long Linares M.D..    Specialty:  Family Medicine    Contact information    [facility]  [address]  [facility] [address]  [phone]          2. Follow up with [facility], Emergency Dept.    Specialty:  Emergency Medicine    Why:  If symptoms worsen    Contact information    [address] [facility] [address] [phone]      Medication Information     Review all of your home medications and newly ordered medications with your primary doctor and/or pharmacist as soon as possible. Follow medication instructions as directed by your doctor and/or pharmacist.     Please keep your complete medication list with you and share with your physician. Update the information when medications are discontinued, doses are changed, or new medications (including over-the-counter products) are added; and carry medication information at all times in the event of emergency situations.               Medication List      CONTINUE taking these medications        Instructions    Morning  Afternoon Evening Bedtime    levothyroxine 125 MCG Tabs   Last time this was given:  125 mcg on 8/11/2017  6:30 PM   Commonly known as:  SYNTHROID        Take 1 Tab by mouth Every morning on an empty stomach.   Dose:  125 mcg                        sertraline 100 MG Tabs   Last time this was given:  100 mg on 8/11/2017  5:35 PM   Commonly known as:  ZOLOFT        Take 1 Tab by mouth every morning.   Dose:  100 mg                          ASK your doctor about these medications        Instructions    Morning Afternoon Evening Bedtime    ALBUTEROL INH        Inhale  by mouth.                        divalproex  MG Tb24   Commonly known as:  DEPAKOTE ER        Take 3 Tabs by mouth every evening.   Dose:  1500 mg                        naproxen 500 MG Tabs   Commonly known as:  NAPROSYN        Take 1 Tab by mouth 2 times a day, with meals for 7 days.   Dose:  500 mg                        prazosin 2 MG Caps   Commonly known as:  MINIPRESS        Take 2 mg by mouth every evening.   Dose:  2 mg                        XALATAN 0.005 % Soln   Generic drug:  latanoprost        Place 1 Drop in both eyes every evening.   Dose:  1 Drop                             Where to Get Your Medications      You can get these medications from any pharmacy     Bring a paper prescription for each of these medications    - levothyroxine 125 MCG Tabs  - sertraline 100 MG Tabs            Procedures and tests performed during your visit     AMMONIA    CBC WITH DIFFERENTIAL    COMP METABOLIC PANEL    EKG (NOW)    ESTIMATED GFR    FREE THYROXINE    IV Saline Lock    MAGNESIUM    MR-BRAIN-W/O    PHOSPHORUS    TROPONIN    TSH        Discharge Instructions       Dizziness  Dizziness is a common problem. It is a feeling of unsteadiness or light-headedness. You may feel like you are about to faint. Dizziness can lead to injury if you stumble or fall. Anyone can become dizzy, but dizziness is more common in older adults. This condition can be  caused by a number of things, including medicines, dehydration, or illness.  HOME CARE INSTRUCTIONS  Taking these steps may help with your condition:  Eating and Drinking  · Drink enough fluid to keep your urine clear or pale yellow. This helps to keep you from becoming dehydrated. Try to drink more clear fluids, such as water.  · Do not drink alcohol.  · Limit your caffeine intake if directed by your health care provider.  · Limit your salt intake if directed by your health care provider.  Activity  · Avoid making quick movements.  1. Rise slowly from chairs and steady yourself until you feel okay.  2. In the morning, first sit up on the side of the bed. When you feel okay, stand slowly while you hold onto something until you know that your balance is fine.  · Move your legs often if you need to  one place for a long time. Tighten and relax your muscles in your legs while you are standing.  · Do not drive or operate heavy machinery if you feel dizzy.  · Avoid bending down if you feel dizzy. Place items in your home so that they are easy for you to reach without leaning over.  Lifestyle  · Do not use any tobacco products, including cigarettes, chewing tobacco, or electronic cigarettes. If you need help quitting, ask your health care provider.  · Try to reduce your stress level, such as with yoga or meditation. Talk with your health care provider if you need help.  General Instructions  · Watch your dizziness for any changes.  · Take medicines only as directed by your health care provider. Talk with your health care provider if you think that your dizziness is caused by a medicine that you are taking.  · Tell a friend or a family member that you are feeling dizzy. If he or she notices any changes in your behavior, have this person call your health care provider.  · Keep all follow-up visits as directed by your health care provider. This is important.  SEEK MEDICAL CARE IF:  · Your dizziness does not go  away.  · Your dizziness or light-headedness gets worse.  · You feel nauseous.  · You have reduced hearing.  · You have new symptoms.  · You are unsteady on your feet or you feel like the room is spinning.  SEEK IMMEDIATE MEDICAL CARE IF:  · You vomit or have diarrhea and are unable to eat or drink anything.  · You have problems talking, walking, swallowing, or using your arms, hands, or legs.  · You feel generally weak.  · You are not thinking clearly or you have trouble forming sentences. It may take a friend or family member to notice this.  · You have chest pain, abdominal pain, shortness of breath, or sweating.  · Your vision changes.  · You notice any bleeding.  · You have a headache.  · You have neck pain or a stiff neck.  · You have a fever.     This information is not intended to replace advice given to you by your health care provider. Make sure you discuss any questions you have with your health care provider.     Document Released: 06/13/2002 Document Revised: 05/03/2016 Document Reviewed: 12/14/2015  [employer] Interactive Patient Education ©2016 [employer]    Thyroid Diseases  Your thyroid is a butterfly-shaped gland in your neck. It is located just above your collarbone. It is one of your endocrine glands, which make hormones. The thyroid helps set your metabolism. Metabolism is how your body gets energy from the foods you eat.   Millions of people have thyroid diseases. Women experience thyroid problems more often than men. In fact, overactive thyroid problems (hyperthyroidism) occur in 1% of all women. If you have a thyroid disease, your body may use energy more slowly or quickly than it should.   Thyroid problems also include an immune disease where your body reacts against your thyroid gland (called thyroiditis). A different problem involves lumps and bumps (called nodules) that develop in the gland. The nodules are usually, but not always, noncancerous.  THE MOST COMMON THYROID PROBLEMS AND  CAUSES ARE DISCUSSED BELOW  There are many causes for thyroid problems. Treatment depends upon the exact diagnosis and includes trying to reset your body's metabolism to a normal rate.  Hyperthyroidism  Too much thyroid hormone from an overactive thyroid gland is called hyperthyroidism. In hyperthyroidism, the body's metabolism speeds up. One of the most frequent forms of hyperthyroidism is known as Graves' disease. Graves' disease tends to run in families. Although Graves' is thought to be caused by a problem with the immune system, the exact nature of the genetic problem is unknown.  Hypothyroidism  Too little thyroid hormone from an underactive thyroid gland is called hypothyroidism. In hypothyroidism, the body's metabolism is slowed. Several things can cause this condition. Most causes affect the thyroid gland directly and hurt its ability to make enough hormone.   Rarely, there may be a pituitary gland tumor (located near the base of the brain). The tumor can block the pituitary from producing thyroid-stimulating hormone (TSH). Your body makes TSH to stimulate the thyroid to work properly. If the pituitary does not make enough TSH, the thyroid fails to make enough hormones needed for good health.  Whether the problem is caused by thyroid conditions or by the pituitary gland, the result is that the thyroid is not making enough hormones. Hypothyroidism causes many physical and mental processes to become sluggish. The body consumes less oxygen and produces less body heat.  Thyroid Nodules  A thyroid nodule is a small swelling or lump in the thyroid gland. They are common. These nodules represent either a growth of thyroid tissue or a fluid-filled cyst. Both form a lump in the thyroid gland. Almost half of all people will have tiny thyroid nodules at some point in their lives. Typically, these are not noticeable until they become large and affect normal thyroid size. Larger nodules that are greater than a half inch  across (about 1 centimeter) occur in about 5 percent of people.  Although most nodules are not cancerous, people who have them should seek medical care to rule out cancer. Also, some thyroid nodules may produce too much thyroid hormone or become too large. Large nodules or a large gland can interfere with breathing or swallowing or may cause neck discomfort.  Other problems  Other thyroid problems include cancer and thyroiditis. Thyroiditis is a malfunction of the body's immune system. Normally, the immune system works to defend the body against infection and other problems. When the immune system is not working properly, it may mistakenly attack normal cells, tissues, and organs. Examples of autoimmune diseases are Hashimoto's thyroiditis (which causes low thyroid function) and Graves' disease (which causes excess thyroid function).  SYMPTOMS   Symptoms vary greatly depending upon the exact type of problem with the thyroid.  Hyperthyroidism-is when your thyroid is too active and makes more thyroid hormone than your body needs. The most common cause is Graves' Disease. Too much thyroid hormone can cause some or all of the following symptoms:  · Anxiety.   · Irritability.   · Difficulty sleeping.   · Fatigue.   · A rapid or irregular heartbeat.   · A fine tremor of your hands or fingers.   · An increase in perspiration.   · Sensitivity to heat.   · Weight loss, despite normal food intake.   · Brittle hair.   · Enlargement of your thyroid gland (goiter).   · Light menstrual periods.   · Frequent bowel movements.   Graves' disease can specifically cause eye and skin problems. The skin problems involve reddening and swelling of the skin, often on your shins and on the top of your feet. Eye problems can include the following:  · Excess tearing and sensation of grit or sand in either or both eyes.   · Reddened or inflamed eyes.   · Widening of the space between your eyelids.   · Swelling of the lids and tissues around the  eyes.   · Light sensitivity.   · Ulcers on the cornea.   · Double vision.   · Limited eye movements.   · Blurred or reduced vision.   Hypothyroidism- is when your thyroid gland is not active enough. This is more common than hyperthyroidism. Symptoms can vary a lot depending of the severity of the hormone deficiency. Symptoms may develop over a long period of time and can include several of the following:  · Fatigue.   · Sluggishness.   · Increased sensitivity to cold.   · Constipation.   · Pale, dry skin.   · A puffy face.   · Hoarse voice.   · High blood cholesterol level.   · Unexplained weight gain.   · Muscle aches, tenderness and stiffness.   · Pain, stiffness or swelling in your joints.   · Muscle weakness.   · Heavier than normal menstrual periods.   · Brittle fingernails and hair.   · Depression.   Thyroid Nodules - most do not cause signs or symptoms. Occasionally, some may become so large that you can feel or even see the swelling at the base of your neck. You may realize a lump or swelling is there when you are shaving or putting on makeup. Men might become aware of a nodule when shirt collars suddenly feel too tight.  Some nodules produce too much thyroid hormone. This can produce the same symptoms as hyperthyroidism (see above).  Thyroid nodules are seldom cancerous. However, a nodule is more likely to be malignant (cancerous) if it:  · Grows quickly or feels hard.   · Causes you to become hoarse or to have trouble swallowing or breathing.   · Causes enlarged lymph nodes under your jaw or in your neck.   DIAGNOSIS   Because there are so many possible thyroid conditions, your caregiver may ask for a number of tests. They will do this in order to narrow down the exact diagnosis. These tests can include:  · Blood and antibody tests.   · Special thyroid scans using small, safe amounts of radioactive iodine.   · Ultrasound of the thyroid gland (particularly if there is a nodule or lump).   · Biopsy. This is  usually done with a special needle. A needle biopsy is a procedure to obtain a sample of cells from the thyroid. The tissue will be tested in a lab and examined under a microscope.   TREATMENT   Treatment depends on the exact diagnosis.  Hyperthyroidism  · Beta-blockers help relieve many of the symptoms.   · Anti-thyroid medications prevent the thyroid from making excess hormones.   · Radioactive iodine treatment can destroy overactive thyroid cells. The iodine can permanently decrease the amount of hormone produced.   · Surgery to remove the thyroid gland.   · Treatments for eye problems that come from Graves' disease also include medications and special eye surgery, if felt to be appropriate.   Hypothyroidism  Thyroid replacement with levothyroxine is the mainstay of treatment. Treatment with thyroid replacement is usually lifelong and will require monitoring and adjustment from time to time.  Thyroid Nodules  · Watchful waiting. If a small nodule causes no symptoms or signs of cancer on biopsy, then no treatment may be chosen at first. Re-exam and re-checking blood tests would be the recommended follow-up.   · Anti-thyroid medications or radioactive iodine treatment may be recommended if the nodules produce too much thyroid hormone (see Treatment for Hyperthyroidism above).   · Alcohol ablation. Injections of small amounts of ethyl alcohol (ethanol) can cause a non-cancerous nodule to shrink in size.   · Surgery (see Treatment for Hyperthyroidism above).   HOME CARE INSTRUCTIONS   · Take medications as instructed.   · Follow through on recommended testing.   SEEK MEDICAL CARE IF:   · You feel that you are developing symptoms of Hyperthyroidism or Hypothyroidism as described above.   · You develop a new lump/nodule in the neck/thyroid area that you had not noticed before.   · You feel that you are having side effects from medicines prescribed.   · You develop trouble breathing or swallowing.   SEEK IMMEDIATE  MEDICAL CARE IF:   · You develop a fever of 102° F (38.9° C) or higher.   · You develop severe sweating.   · You develop palpitations and/or rapid heart beat.   · You develop shortness of breath.   · You develop nausea and vomiting.   · You develop extreme shakiness.   · You develop agitation.   · You develop lightheadedness or have a fainting episode.   Document Released: 10/14/2008 Document Revised: 03/11/2013 Document Reviewed: 10/14/2008  [employer]® Patient Information ©2013 [employer].          Patient Information     Patient Information    Following emergency treatment: all patient requiring follow-up care must return either to a private physician or a clinic if your condition worsens before you are able to obtain further medical attention, please return to the emergency room.     Billing Information    At [facility], we work to make the billing process streamlined for our patients.  Our Representatives are here to answer any questions you may have regarding your hospital bill.  If you have insurance coverage and have supplied your insurance information to us, we will submit a claim to your insurer on your behalf.  Should you have any questions regarding your bill, we can be reached online or by phone as follows:  Online: You are able pay your bills online or live chat with our representatives about any billing questions you may have. We are here to help Monday - Friday from 8:00am to 7:30pm and 9:00am - 12:00pm on Saturdays.  Please visit [email or web address]  for more information.   Phone:  [phone] or [phone]    Please note that your emergency physician, surgeon, pathologist, radiologist, anesthesiologist, and other specialists are not employed by [facility] and will therefore bill separately for their services.  Please contact them directly for any questions concerning their bills at the numbers below:     Emergency Physician Services:  [phone]  [facility]:  [phone]  [facility]:  [phone]  [facility]:  [phone]    1. Your final bill may vary from the amount quoted upon discharge if all procedures are not complete at that time, or if your doctor has additional procedures of which we are not aware. You will receive an additional bill if you return to the Emergency Department at [facility] for suture removal regardless of the facility of which the sutures were placed.     2. Please arrange for settlement of this account at the emergency registration.    3. All self-pay accounts are due in full at the time of treatment.  If you are unable to meet this obligation then payment is expected within 4-5 days.     4. If you have had radiology studies (CT, X-ray, Ultrasound, MRI), you have received a preliminary result during your emergency department visit. Please contact the radiology department [phone] to receive a copy of your final result. Please discuss the Final result with your primary physician or with the follow up physician provided.     Crisis Hotline:  [place] Crisis Hotline:  8-900-FSAPCFS or 1-459.498.8365  [place] Crisis Hotline:    1-823.735.6598 or 636-995-4151         ED Discharge Follow Up Questions    1. In order to provide you with very good care, we would like to follow up with a phone call in the next few days.  May we have your permission to contact you?     YES /  NO    2. What is the best phone number to call you? (       )_____-__________    3. What is the best time to call you?      Morning  /  Afternoon  /  Evening                   Patient Signature:  ____________________________________________________________    Date:  ____________________________________________________________

## 2017-08-11 NOTE — ED NOTES
Chief Complaint   Patient presents with   • Dizziness     Pt bib ems. Reports sudden onset dizziness, ambulatory all the way to fire station where EMS picked him up. Pt received NS bolus 250ml and 4mg zofran from EMS. pt ambulatory from ems stretcher to hospital bed, steady gait. Pink/warm/dry, alert/oriented x4

## 2017-08-12 NOTE — DISCHARGE INSTRUCTIONS
Dizziness  Dizziness is a common problem. It is a feeling of unsteadiness or light-headedness. You may feel like you are about to faint. Dizziness can lead to injury if you stumble or fall. Anyone can become dizzy, but dizziness is more common in older adults. This condition can be caused by a number of things, including medicines, dehydration, or illness.  HOME CARE INSTRUCTIONS  Taking these steps may help with your condition:  Eating and Drinking  · Drink enough fluid to keep your urine clear or pale yellow. This helps to keep you from becoming dehydrated. Try to drink more clear fluids, such as water.  · Do not drink alcohol.  · Limit your caffeine intake if directed by your health care provider.  · Limit your salt intake if directed by your health care provider.  Activity  · Avoid making quick movements.  1. Rise slowly from chairs and steady yourself until you feel okay.  2. In the morning, first sit up on the side of the bed. When you feel okay, stand slowly while you hold onto something until you know that your balance is fine.  · Move your legs often if you need to  one place for a long time. Tighten and relax your muscles in your legs while you are standing.  · Do not drive or operate heavy machinery if you feel dizzy.  · Avoid bending down if you feel dizzy. Place items in your home so that they are easy for you to reach without leaning over.  Lifestyle  · Do not use any tobacco products, including cigarettes, chewing tobacco, or electronic cigarettes. If you need help quitting, ask your health care provider.  · Try to reduce your stress level, such as with yoga or meditation. Talk with your health care provider if you need help.  General Instructions  · Watch your dizziness for any changes.  · Take medicines only as directed by your health care provider. Talk with your health care provider if you think that your dizziness is caused by a medicine that you are taking.  · Tell a friend or a family  member that you are feeling dizzy. If he or she notices any changes in your behavior, have this person call your health care provider.  · Keep all follow-up visits as directed by your health care provider. This is important.  SEEK MEDICAL CARE IF:  · Your dizziness does not go away.  · Your dizziness or light-headedness gets worse.  · You feel nauseous.  · You have reduced hearing.  · You have new symptoms.  · You are unsteady on your feet or you feel like the room is spinning.  SEEK IMMEDIATE MEDICAL CARE IF:  · You vomit or have diarrhea and are unable to eat or drink anything.  · You have problems talking, walking, swallowing, or using your arms, hands, or legs.  · You feel generally weak.  · You are not thinking clearly or you have trouble forming sentences. It may take a friend or family member to notice this.  · You have chest pain, abdominal pain, shortness of breath, or sweating.  · Your vision changes.  · You notice any bleeding.  · You have a headache.  · You have neck pain or a stiff neck.  · You have a fever.     This information is not intended to replace advice given to you by your health care provider. Make sure you discuss any questions you have with your health care provider.     Document Released: 06/13/2002 Document Revised: 05/03/2016 Document Reviewed: 12/14/2015  [employer] Interactive Patient Education ©2016 [employer]    Thyroid Diseases  Your thyroid is a butterfly-shaped gland in your neck. It is located just above your collarbone. It is one of your endocrine glands, which make hormones. The thyroid helps set your metabolism. Metabolism is how your body gets energy from the foods you eat.   Millions of people have thyroid diseases. Women experience thyroid problems more often than men. In fact, overactive thyroid problems (hyperthyroidism) occur in 1% of all women. If you have a thyroid disease, your body may use energy more slowly or quickly than it should.   Thyroid problems also include  an immune disease where your body reacts against your thyroid gland (called thyroiditis). A different problem involves lumps and bumps (called nodules) that develop in the gland. The nodules are usually, but not always, noncancerous.  THE MOST COMMON THYROID PROBLEMS AND CAUSES ARE DISCUSSED BELOW  There are many causes for thyroid problems. Treatment depends upon the exact diagnosis and includes trying to reset your body's metabolism to a normal rate.  Hyperthyroidism  Too much thyroid hormone from an overactive thyroid gland is called hyperthyroidism. In hyperthyroidism, the body's metabolism speeds up. One of the most frequent forms of hyperthyroidism is known as Graves' disease. Graves' disease tends to run in families. Although Graves' is thought to be caused by a problem with the immune system, the exact nature of the genetic problem is unknown.  Hypothyroidism  Too little thyroid hormone from an underactive thyroid gland is called hypothyroidism. In hypothyroidism, the body's metabolism is slowed. Several things can cause this condition. Most causes affect the thyroid gland directly and hurt its ability to make enough hormone.   Rarely, there may be a pituitary gland tumor (located near the base of the brain). The tumor can block the pituitary from producing thyroid-stimulating hormone (TSH). Your body makes TSH to stimulate the thyroid to work properly. If the pituitary does not make enough TSH, the thyroid fails to make enough hormones needed for good health.  Whether the problem is caused by thyroid conditions or by the pituitary gland, the result is that the thyroid is not making enough hormones. Hypothyroidism causes many physical and mental processes to become sluggish. The body consumes less oxygen and produces less body heat.  Thyroid Nodules  A thyroid nodule is a small swelling or lump in the thyroid gland. They are common. These nodules represent either a growth of thyroid tissue or a fluid-filled  cyst. Both form a lump in the thyroid gland. Almost half of all people will have tiny thyroid nodules at some point in their lives. Typically, these are not noticeable until they become large and affect normal thyroid size. Larger nodules that are greater than a half inch across (about 1 centimeter) occur in about 5 percent of people.  Although most nodules are not cancerous, people who have them should seek medical care to rule out cancer. Also, some thyroid nodules may produce too much thyroid hormone or become too large. Large nodules or a large gland can interfere with breathing or swallowing or may cause neck discomfort.  Other problems  Other thyroid problems include cancer and thyroiditis. Thyroiditis is a malfunction of the body's immune system. Normally, the immune system works to defend the body against infection and other problems. When the immune system is not working properly, it may mistakenly attack normal cells, tissues, and organs. Examples of autoimmune diseases are Hashimoto's thyroiditis (which causes low thyroid function) and Graves' disease (which causes excess thyroid function).  SYMPTOMS   Symptoms vary greatly depending upon the exact type of problem with the thyroid.  Hyperthyroidism-is when your thyroid is too active and makes more thyroid hormone than your body needs. The most common cause is Graves' Disease. Too much thyroid hormone can cause some or all of the following symptoms:  · Anxiety.   · Irritability.   · Difficulty sleeping.   · Fatigue.   · A rapid or irregular heartbeat.   · A fine tremor of your hands or fingers.   · An increase in perspiration.   · Sensitivity to heat.   · Weight loss, despite normal food intake.   · Brittle hair.   · Enlargement of your thyroid gland (goiter).   · Light menstrual periods.   · Frequent bowel movements.   Graves' disease can specifically cause eye and skin problems. The skin problems involve reddening and swelling of the skin, often on your  shins and on the top of your feet. Eye problems can include the following:  · Excess tearing and sensation of grit or sand in either or both eyes.   · Reddened or inflamed eyes.   · Widening of the space between your eyelids.   · Swelling of the lids and tissues around the eyes.   · Light sensitivity.   · Ulcers on the cornea.   · Double vision.   · Limited eye movements.   · Blurred or reduced vision.   Hypothyroidism- is when your thyroid gland is not active enough. This is more common than hyperthyroidism. Symptoms can vary a lot depending of the severity of the hormone deficiency. Symptoms may develop over a long period of time and can include several of the following:  · Fatigue.   · Sluggishness.   · Increased sensitivity to cold.   · Constipation.   · Pale, dry skin.   · A puffy face.   · Hoarse voice.   · High blood cholesterol level.   · Unexplained weight gain.   · Muscle aches, tenderness and stiffness.   · Pain, stiffness or swelling in your joints.   · Muscle weakness.   · Heavier than normal menstrual periods.   · Brittle fingernails and hair.   · Depression.   Thyroid Nodules - most do not cause signs or symptoms. Occasionally, some may become so large that you can feel or even see the swelling at the base of your neck. You may realize a lump or swelling is there when you are shaving or putting on makeup. Men might become aware of a nodule when shirt collars suddenly feel too tight.  Some nodules produce too much thyroid hormone. This can produce the same symptoms as hyperthyroidism (see above).  Thyroid nodules are seldom cancerous. However, a nodule is more likely to be malignant (cancerous) if it:  · Grows quickly or feels hard.   · Causes you to become hoarse or to have trouble swallowing or breathing.   · Causes enlarged lymph nodes under your jaw or in your neck.   DIAGNOSIS   Because there are so many possible thyroid conditions, your caregiver may ask for a number of tests. They will do this  in order to narrow down the exact diagnosis. These tests can include:  · Blood and antibody tests.   · Special thyroid scans using small, safe amounts of radioactive iodine.   · Ultrasound of the thyroid gland (particularly if there is a nodule or lump).   · Biopsy. This is usually done with a special needle. A needle biopsy is a procedure to obtain a sample of cells from the thyroid. The tissue will be tested in a lab and examined under a microscope.   TREATMENT   Treatment depends on the exact diagnosis.  Hyperthyroidism  · Beta-blockers help relieve many of the symptoms.   · Anti-thyroid medications prevent the thyroid from making excess hormones.   · Radioactive iodine treatment can destroy overactive thyroid cells. The iodine can permanently decrease the amount of hormone produced.   · Surgery to remove the thyroid gland.   · Treatments for eye problems that come from Graves' disease also include medications and special eye surgery, if felt to be appropriate.   Hypothyroidism  Thyroid replacement with levothyroxine is the mainstay of treatment. Treatment with thyroid replacement is usually lifelong and will require monitoring and adjustment from time to time.  Thyroid Nodules  · Watchful waiting. If a small nodule causes no symptoms or signs of cancer on biopsy, then no treatment may be chosen at first. Re-exam and re-checking blood tests would be the recommended follow-up.   · Anti-thyroid medications or radioactive iodine treatment may be recommended if the nodules produce too much thyroid hormone (see Treatment for Hyperthyroidism above).   · Alcohol ablation. Injections of small amounts of ethyl alcohol (ethanol) can cause a non-cancerous nodule to shrink in size.   · Surgery (see Treatment for Hyperthyroidism above).   HOME CARE INSTRUCTIONS   · Take medications as instructed.   · Follow through on recommended testing.   SEEK MEDICAL CARE IF:   · You feel that you are developing symptoms of Hyperthyroidism  or Hypothyroidism as described above.   · You develop a new lump/nodule in the neck/thyroid area that you had not noticed before.   · You feel that you are having side effects from medicines prescribed.   · You develop trouble breathing or swallowing.   SEEK IMMEDIATE MEDICAL CARE IF:   · You develop a fever of 102° F (38.9° C) or higher.   · You develop severe sweating.   · You develop palpitations and/or rapid heart beat.   · You develop shortness of breath.   · You develop nausea and vomiting.   · You develop extreme shakiness.   · You develop agitation.   · You develop lightheadedness or have a fainting episode.   Document Released: 10/14/2008 Document Revised: 03/11/2013 Document Reviewed: 10/14/2008  [employer]® Patient Information ©2013 [employer].

## 2017-08-12 NOTE — ED PROVIDER NOTES
ED Provider Note    ED Provider Note          CHIEF COMPLAINT  Chief Complaint   Patient presents with   • Dizziness     Pt bib ems. Reports sudden onset dizziness, ambulatory all the way to fire station where EMS picked him up. Pt received NS bolus 250ml and 4mg zofran from EMS. pt ambulatory from ems stretcher to hospital bed, steady gait. Pink/warm/dry, alert/oriented x4       HPI  Norm Prabhakar is a 37 y.o. male who presents to the Emergency Department by EMS for concern of an episode of dizziness earlier today. He was at the [facility] earlier and walked from the [facility] wall down to [facility]. He then later got some food and at the time started to feel dizzy therefore he walked over to the fire station and said he felt very dizzy. He said he felt lightheaded like he is going to pass out but did not. He also says he feels diffusely weak. He has been out of his thyroid medication for the last 3 days as well as off of sertraline. He has a history of seizure disorder but denies any recent seizures. Denies details medications as directed. He currently denies any chest pain, nausea, vomiting. He is only complaint currently is that he feels diffusely weak and has no reported dizziness or headedness right now.  REVIEW OF SYSTEMS  Review of Systems   Constitutional: Negative for fever and fatigue.   HENT: Negative for sore throat.    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    Cardiovascular: Negative for chest pain.   Gastrointestinal: Negative for nausea, vomiting and abdominal pain.   Genitourinary: Negative for difficulty urinating.   Musculoskeletal: Negative for myalgias.   Skin: Negative for rash.   Neurological: Positive for weakness and light-headedness. Negative for syncope.   Psychiatric/Behavioral: Negative for confusion.       PAST MEDICAL HISTORY   has a past medical history of ASTHMA; Glaucoma; Hypothyroidism;  "Depression; Anxiety; Seizure disorder (CMS-HCC); Bipolar 1 disorder (CMS-HCC); S/P thyroidectomy; Murmur; Fall; Glaucoma (1982); Psychiatric problem (2002); Mental disorder; Seizure (CMS-HCC) (2010); Pneumonia; Indigestion; and Unspecified disorder of thyroid.    SURGICAL HISTORY   has past surgical history that includes eye surgery; thyroid lobectomy; and other.    SOCIAL HISTORY  Social History   Substance Use Topics   • Smoking status: Former Smoker -- 0.25 packs/day for 4 years     Types: Cigarettes     Quit date: 01/01/2014   • Smokeless tobacco: Never Used   • Alcohol Use: No      History   Drug Use   • Yes     Comment: pot       FAMILY HISTORY  Family History   Problem Relation Age of Onset   • Hypertension Mother    • Heart Disease Mother    • Lung Disease Mother    • Stroke Maternal Grandmother        CURRENT MEDICATIONS  Reviewed.  See Encounter Summary.     ALLERGIES  Allergies   Allergen Reactions   • Abilify Unspecified     \"Feeling tired, like I don't even know whats going on around me\"   • Fish        PHYSICAL EXAM  VITAL SIGNS: /51 mmHg  Pulse 70  Temp(Src) 36.6 °C (97.9 °F)  Resp 20  Ht 1.956 m (6' 5\")  Wt 140.615 kg (310 lb)  BMI 36.75 kg/m2  SpO2 96%  Physical Exam   Constitutional: He is oriented to person, place, and time. He appears well-developed.   HENT:   Head: Normocephalic and atraumatic.   Eyes:   Left eye appears more hazy which is he is blind in right pupil is reactive   Neck: Normal range of motion.   Cardiovascular: Normal rate, normal heart sounds and intact distal pulses.    Pulmonary/Chest: Effort normal and breath sounds normal. No respiratory distress. He has no wheezes.   Abdominal: Soft. He exhibits no distension. There is no tenderness.   Musculoskeletal: Normal range of motion. He exhibits no edema.   Neurological: He is alert and oriented to person, place, and time.   Patient is able to lift both his legs off the bed however he needs a lot of encouragement to do " it as well as does have 5 out of 5 strength in upper extremities as well   Skin: Skin is warm. He is not diaphoretic.   Psychiatric: He has a normal mood and affect.           DIAGNOSTIC STUDIES / PROCEDURES     LABS  Results for orders placed or performed during the hospital encounter of 08/11/17   CBC WITH DIFFERENTIAL   Result Value Ref Range    WBC 10.2 4.8 - 10.8 K/uL    RBC 4.27 (L) 4.70 - 6.10 M/uL    Hemoglobin 13.0 (L) 14.0 - 18.0 g/dL    Hematocrit 38.8 (L) 42.0 - 52.0 %    MCV 90.9 81.4 - 97.8 fL    MCH 30.4 27.0 - 33.0 pg    MCHC 33.5 (L) 33.7 - 35.3 g/dL    RDW 44.6 35.9 - 50.0 fL    Platelet Count 307 164 - 446 K/uL    MPV 10.5 9.0 - 12.9 fL    Neutrophils-Polys 58.90 44.00 - 72.00 %    Lymphocytes 28.00 22.00 - 41.00 %    Monocytes 9.60 0.00 - 13.40 %    Eosinophils 1.50 0.00 - 6.90 %    Basophils 1.10 0.00 - 1.80 %    Immature Granulocytes 0.90 0.00 - 0.90 %    Nucleated RBC 0.00 /100 WBC    Neutrophils (Absolute) 6.00 1.82 - 7.42 K/uL    Lymphs (Absolute) 2.85 1.00 - 4.80 K/uL    Monos (Absolute) 0.98 (H) 0.00 - 0.85 K/uL    Eos (Absolute) 0.15 0.00 - 0.51 K/uL    Baso (Absolute) 0.11 0.00 - 0.12 K/uL    Immature Granulocytes (abs) 0.09 0.00 - 0.11 K/uL    NRBC (Absolute) 0.00 K/uL   COMP METABOLIC PANEL   Result Value Ref Range    Sodium 138 135 - 145 mmol/L    Potassium 4.2 3.6 - 5.5 mmol/L    Chloride 105 96 - 112 mmol/L    Co2 25 20 - 33 mmol/L    Anion Gap 8.0 0.0 - 11.9    Glucose 122 (H) 65 - 99 mg/dL    Bun 25 (H) 8 - 22 mg/dL    Creatinine 1.01 0.50 - 1.40 mg/dL    Calcium 9.7 8.5 - 10.5 mg/dL    AST(SGOT) 22 12 - 45 U/L    ALT(SGPT) 17 2 - 50 U/L    Alkaline Phosphatase 50 30 - 99 U/L    Total Bilirubin 0.3 0.1 - 1.5 mg/dL    Albumin 4.3 3.2 - 4.9 g/dL    Total Protein 6.6 6.0 - 8.2 g/dL    Globulin 2.3 1.9 - 3.5 g/dL    A-G Ratio 1.9 g/dL   TROPONIN   Result Value Ref Range    Troponin I <0.01 0.00 - 0.04 ng/mL   MAGNESIUM   Result Value Ref Range    Magnesium 2.1 1.5 - 2.5 mg/dL    PHOSPHORUS   Result Value Ref Range    Phosphorus 2.9 2.5 - 4.5 mg/dL   TSH   Result Value Ref Range    TSH 15.130 (H) 0.300 - 3.700 uIU/mL   AMMONIA   Result Value Ref Range    Ammonia <10 (L) 11 - 45 umol/L   ESTIMATED GFR   Result Value Ref Range    GFR If African American >60 >60 mL/min/1.73 m 2    GFR If Non African American >60 >60 mL/min/1.73 m 2   FREE THYROXINE   Result Value Ref Range    Free T-4 0.62 0.53 - 1.43 ng/dL   EKG (NOW)   Result Value Ref Range    Report       [facility] Emergency Dept.    Test Date:  2017  Pt Name:    HOLLY KIM                 Department: ER  MRN:        [id number]                      Room:       [facility]  Gender:     M                            Technician: [id number]  :        1980                   Requested By:FLORINA ROBERT  Order #:    [id number]                    Reading MD:    Measurements  Intervals                                Axis  Rate:       78                           P:          50  OK:         164                          QRS:        52  QRSD:       112                          T:          12  QT:         376  QTc:        429    Interpretive Statements  SINUS RHYTHM  NONSPECIFIC INTRAVENTRICULAR CONDUCTION DELAY  Compared to ECG 04/15/2017 22:52:31  Sinus bradycardia no longer present         All labs were reviewed by me.    EKG Time completed  512  12 Lead EKG interpreted by me to show:  Sinus rhythm   Rate 78  Axis: Normal  Intervals:       [id number]  No ST elevation changes >1mm  Clinical Impression:  Otherwise normal EKG.     RADIOLOGY  MR-BRAIN-W/O   Final Result      1.  No evidence of acute territorial infarct, intracranial hemorrhage or mass lesion.   2.  Stable diffuse confluent periventricular white matter signal abnormality throughout both hemispheres consistent with leukoencephalopathy.   3.  Mild diffuse cerebral substance loss.        The radiologist's interpretation of all radiological studies have been  "reviewed by me.    COURSE & MEDICAL DECISION MAKING  Pertinent Labs & Imaging studies reviewed. (See chart for details)    5:03 PM - Patient seen and examined at bedside.     Decision Making:  This is a 37 y.o. year old male who presents with concern of an episode of dizziness. He presents here alert and oriented ×4 he has some slurred speech which is his baseline and he does have some diffuse weakness however some of this appears to be possibly effort dependent. Differential includes exhaustion, heat stroke, electrolyte abnormality, medication noncompliance, SSRI withdrawal. He is also on Depakote and ammonia was negative. He had no leukocytosis. He had no significant anemia. He is not hypoglycemic either. His TSH is elevated however he has long-standing hypothyroid and free T4 is now normal. I went to evaluate him after LABS came back and he was saying that he was \"too weak\" to get up and walk. I was not sure because his exam was now changing and he was saying that he was just feeling not well and very weak therefore I did do an MRI of his brain. He has had some intermittent deficits before however it sounds like he is not having any focal findings at this time or focal complaints but I was still concern hence the MRI. MRI showed no evidence of any stroke. In the interim while the MRI was being completed and afterwards he miraculously was not weak anymore and was completely fine. He is able to get up and ambulate with a steady gait. He did not want to give us a urine sample. He later was asking for a work note and felt fine and was asking to go home. While I'm not sure what causes diffuse weakness he did walk for a thyroid possibly could be dehydration. Given a liter of IV fluid normal saline bolus. However he is now feeling completely fine and does not have any focal deficit be discharged home with strict return precautions. I did however also refill his Synthroid as well as sertraline which I did give him both a " dose of years and has been off of them for about a week which could also be controlled bleeding to his symptoms.    FINAL IMPRESSION  1. Dizziness    2. Non compliance w medication regimen    3. Elevated TSH

## 2017-08-12 NOTE — ED NOTES
Pt discharged home. Assessment complete. Pt ambulates self. Pt verbalized discharge instructions. Prescriptions given pt verbalizes teaching provided.

## 2017-08-17 PROCEDURE — 99284 EMERGENCY DEPT VISIT MOD MDM: CPT

## 2017-08-18 ENCOUNTER — HOSPITAL ENCOUNTER (EMERGENCY)
Facility: MEDICAL CENTER | Age: 37
End: 2017-08-18
Attending: EMERGENCY MEDICINE
Payer: MEDICAID

## 2017-08-18 ENCOUNTER — TELEPHONE (OUTPATIENT)
Dept: PHARMACY | Facility: MEDICAL CENTER | Age: 37
End: 2017-08-18

## 2017-08-18 VITALS
BODY MASS INDEX: 35.48 KG/M2 | WEIGHT: 306.66 LBS | SYSTOLIC BLOOD PRESSURE: 122 MMHG | DIASTOLIC BLOOD PRESSURE: 78 MMHG | RESPIRATION RATE: 18 BRPM | HEART RATE: 80 BPM | HEIGHT: 78 IN | TEMPERATURE: 98 F | OXYGEN SATURATION: 98 %

## 2017-08-18 DIAGNOSIS — J02.0 STREP PHARYNGITIS: ICD-10-CM

## 2017-08-18 PROCEDURE — A9270 NON-COVERED ITEM OR SERVICE: HCPCS | Performed by: EMERGENCY MEDICINE

## 2017-08-18 PROCEDURE — 700102 HCHG RX REV CODE 250 W/ 637 OVERRIDE(OP): Performed by: EMERGENCY MEDICINE

## 2017-08-18 PROCEDURE — 700111 HCHG RX REV CODE 636 W/ 250 OVERRIDE (IP): Performed by: EMERGENCY MEDICINE

## 2017-08-18 PROCEDURE — 96374 THER/PROPH/DIAG INJ IV PUSH: CPT

## 2017-08-18 RX ORDER — AMOXICILLIN 500 MG/1
500 CAPSULE ORAL 3 TIMES DAILY
Qty: 30 CAP | Refills: 0 | Status: SHIPPED | OUTPATIENT
Start: 2017-08-18 | End: 2017-08-18

## 2017-08-18 RX ORDER — AMOXICILLIN 500 MG/1
500 CAPSULE ORAL 3 TIMES DAILY
Qty: 30 CAP | Refills: 0 | Status: SHIPPED | OUTPATIENT
Start: 2017-08-18 | End: 2017-08-18 | Stop reason: SDUPTHER

## 2017-08-18 RX ORDER — KETOROLAC TROMETHAMINE 30 MG/ML
60 INJECTION, SOLUTION INTRAMUSCULAR; INTRAVENOUS ONCE
Status: COMPLETED | OUTPATIENT
Start: 2017-08-18 | End: 2017-08-18

## 2017-08-18 RX ORDER — AMOXICILLIN 500 MG/1
500 CAPSULE ORAL 3 TIMES DAILY
Qty: 30 CAP | Refills: 0 | Status: SHIPPED | OUTPATIENT
Start: 2017-08-18 | End: 2017-09-12

## 2017-08-18 RX ORDER — AMOXICILLIN 500 MG/1
500 CAPSULE ORAL ONCE
Status: COMPLETED | OUTPATIENT
Start: 2017-08-18 | End: 2017-08-18

## 2017-08-18 RX ORDER — AMOXICILLIN 500 MG/1
500 CAPSULE ORAL ONCE
Qty: 30 CAP | Refills: 0 | Status: SHIPPED | OUTPATIENT
Start: 2017-08-18 | End: 2017-08-18

## 2017-08-18 RX ADMIN — AMOXICILLIN 500 MG: 500 CAPSULE ORAL at 02:27

## 2017-08-18 RX ADMIN — KETOROLAC TROMETHAMINE 60 MG: 30 INJECTION, SOLUTION INTRAMUSCULAR at 02:26

## 2017-08-18 ASSESSMENT — LIFESTYLE VARIABLES: DO YOU DRINK ALCOHOL: NO

## 2017-08-18 NOTE — ED NOTES
Received a call from [facility] stating that they no longer fill his prescriptions.  I have contacted the patient and he gets his prescriptions filled at the [facility] on [address]. I have re-transmitted the prescription for amoxicillin to that pharmacy.    Meme Sherman, PharmD, CGP, BCPS

## 2017-08-18 NOTE — ED NOTES
This RN contacted ERP to notify him that there are no discharge instructions available for patient. ERP aware. Patient is medicated as ordered by provider. Pt educated regarding medication administered by this RN, and patient verbalized understanding. Pt has no further questions at this time. Continue to monitor.

## 2017-08-18 NOTE — ED AVS SNAPSHOT
Home Care Instructions                                                                                                                Norm Prabhakar   MRN: [id number]    Department:  [facility], Emergency Dept   Date of Visit:  8/17/2017            [facility], Emergency Dept    [address] [address]    Phone:  [phone]      You were seen by     Dick Keith M.D.      Your Diagnosis Was     Strep pharyngitis     J02.0       These are the medications you received during your hospitalization from 08/17/2017 2302 to 08/18/2017 0318     Date/Time Order Dose Route Action    08/18/2017 0226 ketorolac (TORADOL) injection 60 mg 60 mg Intravenous Given    08/18/2017 0227 amoxicillin (AMOXIL) capsule 500 mg 500 mg Oral Given      Follow-up Information     1. Follow up with Long Linares M.D..    Specialty:  Family Medicine    Why:  As needed    Contact information    [facility]  [address]  [facility] [address]  [phone]          2. Follow up with [facility], Emergency Dept.    Specialty:  Emergency Medicine    Why:  If symptoms worsen    Contact information    [address] [address] [phone]      Medication Information     Review all of your home medications and newly ordered medications with your primary doctor and/or pharmacist as soon as possible. Follow medication instructions as directed by your doctor and/or pharmacist.     Please keep your complete medication list with you and share with your physician. Update the information when medications are discontinued, doses are changed, or new medications (including over-the-counter products) are added; and carry medication information at all times in the event of emergency situations.               Medication List      START taking these medications        Instructions    Morning Afternoon Evening Bedtime    amoxicillin 500 MG Caps   Last time this was given:   500 mg on 8/18/2017  2:27 AM   Commonly known as:  AMOXIL        Take 1 Cap by mouth 3 times a day.   Dose:  500 mg                          ASK your doctor about these medications        Instructions    Morning Afternoon Evening Bedtime    ALBUTEROL INH        Inhale  by mouth.                        divalproex  MG Tb24   Commonly known as:  DEPAKOTE ER        Take 3 Tabs by mouth every evening.   Dose:  1500 mg                        levothyroxine 125 MCG Tabs   Commonly known as:  SYNTHROID        Take 1 Tab by mouth Every morning on an empty stomach.   Dose:  125 mcg                        prazosin 2 MG Caps   Commonly known as:  MINIPRESS        Take 2 mg by mouth every evening.   Dose:  2 mg                        sertraline 100 MG Tabs   Commonly known as:  ZOLOFT        Take 1 Tab by mouth every morning.   Dose:  100 mg                        XALATAN 0.005 % Soln   Generic drug:  latanoprost        Place 1 Drop in both eyes every evening.   Dose:  1 Drop                             Where to Get Your Medications      These medications were sent to [facility] [place], [place] - [address] [place] [address] Linnette Crespo. [address] [address]     Phone:  [phone]    - amoxicillin 500 MG Caps              Discharge Instructions       Pharyngitis  Pharyngitis is redness, pain, and swelling (inflammation) of your pharynx.   CAUSES   Pharyngitis is usually caused by infection. Most of the time, these infections are from viruses (viral) and are part of a cold. However, sometimes pharyngitis is caused by bacteria (bacterial). Pharyngitis can also be caused by allergies. Viral pharyngitis may be spread from person to person by coughing, sneezing, and personal items or utensils (cups, forks, spoons, toothbrushes). Bacterial pharyngitis may be spread from person to person by more intimate contact, such as kissing.   SIGNS AND SYMPTOMS   Symptoms of pharyngitis include:    · Sore throat.     · Tiredness (fatigue).    · Low-grade fever.    · Headache.  · Joint pain and muscle aches.  · Skin rashes.  · Swollen lymph nodes.  · Plaque-like film on throat or tonsils (often seen with bacterial pharyngitis).  DIAGNOSIS   Your health care provider will ask you questions about your illness and your symptoms. Your medical history, along with a physical exam, is often all that is needed to diagnose pharyngitis. Sometimes, a rapid strep test is done. Other lab tests may also be done, depending on the suspected cause.   TREATMENT   Viral pharyngitis will usually get better in 3-4 days without the use of medicine. Bacterial pharyngitis is treated with medicines that kill germs (antibiotics).   HOME CARE INSTRUCTIONS   · Drink enough water and fluids to keep your urine clear or pale yellow.    · Only take over-the-counter or prescription medicines as directed by your health care provider:    ¨ If you are prescribed antibiotics, make sure you finish them even if you start to feel better.    ¨ Do not take aspirin.    · Get lots of rest.    · Gargle with 8 oz of salt water (½ tsp of salt per 1 qt of water) as often as every 1-2 hours to soothe your throat.    · Throat lozenges (if you are not at risk for choking) or sprays may be used to soothe your throat.  SEEK MEDICAL CARE IF:   · You have large, tender lumps in your neck.  · You have a rash.  · You cough up green, yellow-brown, or bloody spit.  SEEK IMMEDIATE MEDICAL CARE IF:   · Your neck becomes stiff.  · You drool or are unable to swallow liquids.  · You vomit or are unable to keep medicines or liquids down.  · You have severe pain that does not go away with the use of recommended medicines.  · You have trouble breathing (not caused by a stuffy nose).  MAKE SURE YOU:   · Understand these instructions.  · Will watch your condition.  · Will get help right away if you are not doing well or get worse.     This information is not intended to replace advice given to  you by your health care provider. Make sure you discuss any questions you have with your health care provider.     Document Released: 12/18/2006 Document Revised: 10/08/2014 Document Reviewed: 08/25/2014  Elsevier Interactive Patient Education ©2016 Elsevier Inc.            Patient Information     Patient Information    Following emergency treatment: all patient requiring follow-up care must return either to a private physician or a clinic if your condition worsens before you are able to obtain further medical attention, please return to the emergency room.     Billing Information    At [facility], we work to make the billing process streamlined for our patients.  Our Representatives are here to answer any questions you may have regarding your hospital bill.  If you have insurance coverage and have supplied your insurance information to us, we will submit a claim to your insurer on your behalf.  Should you have any questions regarding your bill, we can be reached online or by phone as follows:  Online: You are able pay your bills online or live chat with our representatives about any billing questions you may have. We are here to help Monday - Friday from 8:00am to 7:30pm and 9:00am - 12:00pm on Saturdays.  Please visit [email or web address]  for more information.   Phone:  [phone] or [phone]    Please note that your emergency physician, surgeon, pathologist, radiologist, anesthesiologist, and other specialists are not employed by [facility] and will therefore bill separately for their services.  Please contact them directly for any questions concerning their bills at the numbers below:     Emergency Physician Services:  [phone]  [facility]:  [phone]  [facility]:  [phone]  [facility]:  [phone]    1. Your final bill may vary from the amount quoted upon discharge if all procedures are not complete at that  time, or if your doctor has additional procedures of which we are not aware. You will receive an additional bill if you return to the Emergency Department at [facility] for suture removal regardless of the facility of which the sutures were placed.     2. Please arrange for settlement of this account at the emergency registration.    3. All self-pay accounts are due in full at the time of treatment.  If you are unable to meet this obligation then payment is expected within 4-5 days.     4. If you have had radiology studies (CT, X-ray, Ultrasound, MRI), you have received a preliminary result during your emergency department visit. Please contact the radiology department [phone] to receive a copy of your final result. Please discuss the Final result with your primary physician or with the follow up physician provided.     Crisis Hotline:  [place] Crisis Hotline:  2-782-YHYZYAQ or 1-688.464.9471  [place] Crisis Hotline:    1-359.829.9238 or 441-518-7626         ED Discharge Follow Up Questions    1. In order to provide you with very good care, we would like to follow up with a phone call in the next few days.  May we have your permission to contact you?     YES /  NO    2. What is the best phone number to call you? (       )_____-__________    3. What is the best time to call you?      Morning  /  Afternoon  /  Evening                   Patient Signature:  ____________________________________________________________    Date:  ____________________________________________________________

## 2017-08-18 NOTE — ED AVS SNAPSHOT
Lumaqco Access Code: [email or web address]  Expires: 9/8/2017  2:44 AM    Lumaqco  A secure, online tool to manage your health information     [employer]’s Lumaqco® is a secure, online tool that connects you to your personalized health information from the privacy of your home -- day or night - making it very easy for you to manage your healthcare. Once the activation process is completed, you can even access your medical information using the Lumaqco jodi, which is available for free in the Apple Jodi store or Google Play store.     Lumaqco provides the following levels of access (as shown below):   My Chart Features   [facility] Primary Care Doctor [facility]  Specialists [facility]  Urgent  Care Non-[facility]  Primary Care  Doctor   Email your healthcare team securely and privately 24/7 X X X X   Manage appointments: schedule your next appointment; view details of past/upcoming appointments X      Request prescription refills. X      View recent personal medical records, including lab and immunizations X X X X   View health record, including health history, allergies, medications X X X X   Read reports about your outpatient visits, procedures, consult and ER notes X X X X   See your discharge summary, which is a recap of your hospital and/or ER visit that includes your diagnosis, lab results, and care plan. X X       How to register for Lumaqco:  1. Go to  [email or web address].  2. Click on the Sign Up Now box, which takes you to the New Member Sign Up page. You will need to provide the following information:  a. Enter your Lumaqco Access Code exactly as it appears at the top of this page. (You will not need to use this code after you’ve completed the sign-up process. If you do not sign up before the expiration date, you must request a new code.)   b. Enter your date of birth.   c. Enter your home email address.   d. Click Submit, and follow the next screen’s instructions.  3. Create a Lumaqco ID. This will be your Lumaqco  login ID and cannot be changed, so think of one that is secure and easy to remember.  4. Create a Workstir password. You can change your password at any time.  5. Enter your Password Reset Question and Answer. This can be used at a later time if you forget your password.   6. Enter your e-mail address. This allows you to receive e-mail notifications when new information is available in Workstir.  7. Click Sign Up. You can now view your health information.    For assistance activating your Workstir account, call [phone]

## 2017-08-18 NOTE — DISCHARGE INSTRUCTIONS
Pharyngitis  Pharyngitis is redness, pain, and swelling (inflammation) of your pharynx.   CAUSES   Pharyngitis is usually caused by infection. Most of the time, these infections are from viruses (viral) and are part of a cold. However, sometimes pharyngitis is caused by bacteria (bacterial). Pharyngitis can also be caused by allergies. Viral pharyngitis may be spread from person to person by coughing, sneezing, and personal items or utensils (cups, forks, spoons, toothbrushes). Bacterial pharyngitis may be spread from person to person by more intimate contact, such as kissing.   SIGNS AND SYMPTOMS   Symptoms of pharyngitis include:    · Sore throat.    · Tiredness (fatigue).    · Low-grade fever.    · Headache.  · Joint pain and muscle aches.  · Skin rashes.  · Swollen lymph nodes.  · Plaque-like film on throat or tonsils (often seen with bacterial pharyngitis).  DIAGNOSIS   Your health care provider will ask you questions about your illness and your symptoms. Your medical history, along with a physical exam, is often all that is needed to diagnose pharyngitis. Sometimes, a rapid strep test is done. Other lab tests may also be done, depending on the suspected cause.   TREATMENT   Viral pharyngitis will usually get better in 3-4 days without the use of medicine. Bacterial pharyngitis is treated with medicines that kill germs (antibiotics).   HOME CARE INSTRUCTIONS   · Drink enough water and fluids to keep your urine clear or pale yellow.    · Only take over-the-counter or prescription medicines as directed by your health care provider:    ¨ If you are prescribed antibiotics, make sure you finish them even if you start to feel better.    ¨ Do not take aspirin.    · Get lots of rest.    · Gargle with 8 oz of salt water (½ tsp of salt per 1 qt of water) as often as every 1-2 hours to soothe your throat.    · Throat lozenges (if you are not at risk for choking) or sprays may be used to soothe your throat.  SEEK MEDICAL  CARE IF:   · You have large, tender lumps in your neck.  · You have a rash.  · You cough up green, yellow-brown, or bloody spit.  SEEK IMMEDIATE MEDICAL CARE IF:   · Your neck becomes stiff.  · You drool or are unable to swallow liquids.  · You vomit or are unable to keep medicines or liquids down.  · You have severe pain that does not go away with the use of recommended medicines.  · You have trouble breathing (not caused by a stuffy nose).  MAKE SURE YOU:   · Understand these instructions.  · Will watch your condition.  · Will get help right away if you are not doing well or get worse.     This information is not intended to replace advice given to you by your health care provider. Make sure you discuss any questions you have with your health care provider.     Document Released: 12/18/2006 Document Revised: 10/08/2014 Document Reviewed: 08/25/2014  [employer] Interactive Patient Education ©2016 Elsevier Inc.    Pharyngitis  Pharyngitis is redness, pain, and swelling (inflammation) of your pharynx.   CAUSES   Pharyngitis is usually caused by infection. Most of the time, these infections are from viruses (viral) and are part of a cold. However, sometimes pharyngitis is caused by bacteria (bacterial). Pharyngitis can also be caused by allergies. Viral pharyngitis may be spread from person to person by coughing, sneezing, and personal items or utensils (cups, forks, spoons, toothbrushes). Bacterial pharyngitis may be spread from person to person by more intimate contact, such as kissing.   SIGNS AND SYMPTOMS   Symptoms of pharyngitis include:    · Sore throat.    · Tiredness (fatigue).    · Low-grade fever.    · Headache.  · Joint pain and muscle aches.  · Skin rashes.  · Swollen lymph nodes.  · Plaque-like film on throat or tonsils (often seen with bacterial pharyngitis).  DIAGNOSIS   Your health care provider will ask you questions about your illness and your symptoms. Your medical history, along with a physical  exam, is often all that is needed to diagnose pharyngitis. Sometimes, a rapid strep test is done. Other lab tests may also be done, depending on the suspected cause.   TREATMENT   Viral pharyngitis will usually get better in 3-4 days without the use of medicine. Bacterial pharyngitis is treated with medicines that kill germs (antibiotics).   HOME CARE INSTRUCTIONS   · Drink enough water and fluids to keep your urine clear or pale yellow.    · Only take over-the-counter or prescription medicines as directed by your health care provider:    ¨ If you are prescribed antibiotics, make sure you finish them even if you start to feel better.    ¨ Do not take aspirin.    · Get lots of rest.    · Gargle with 8 oz of salt water (½ tsp of salt per 1 qt of water) as often as every 1-2 hours to soothe your throat.    · Throat lozenges (if you are not at risk for choking) or sprays may be used to soothe your throat.  SEEK MEDICAL CARE IF:   · You have large, tender lumps in your neck.  · You have a rash.  · You cough up green, yellow-brown, or bloody spit.  SEEK IMMEDIATE MEDICAL CARE IF:   · Your neck becomes stiff.  · You drool or are unable to swallow liquids.  · You vomit or are unable to keep medicines or liquids down.  · You have severe pain that does not go away with the use of recommended medicines.  · You have trouble breathing (not caused by a stuffy nose).  MAKE SURE YOU:   · Understand these instructions.  · Will watch your condition.  · Will get help right away if you are not doing well or get worse.     This information is not intended to replace advice given to you by your health care provider. Make sure you discuss any questions you have with your health care provider.     Document Released: 12/18/2006 Document Revised: 10/08/2014 Document Reviewed: 08/25/2014  [employer] Interactive Patient Education ©2016 [employer]

## 2017-08-18 NOTE — ED PROVIDER NOTES
ED Provider Note    ED Provider Note      Primary care provider: Long Linares M.D.    CHIEF COMPLAINT  Chief Complaint   Patient presents with   • Sore Throat     started today, hurts to [facility].  hx of goiter removal 19 years ago        HPI  Norm Prabhakar is a 37 y.o. male who presents to the Emergency Department chief complaint of sore throat. Patient reports is been progressive over the last couple days, the point where the pain is so bad that he doesn't want [facility] [place] is 9 out of 10 worst: Slightly better with soda. He's had some chills no fevers no cough congestion no shortness of breath no chest pain he reports no abdominal pain,       REVIEW OF SYSTEMS  10 systems reviewed and otherwise negative, pertinent positives and negatives listed in the history of present illness.      PAST MEDICAL HISTORY   has a past medical history of ASTHMA; Glaucoma; Hypothyroidism; Depression; Anxiety; Seizure disorder (CMS-HCC); Bipolar 1 disorder (CMS-HCC); S/P thyroidectomy; Murmur; Fall; Glaucoma (1982); Psychiatric problem (2002); Mental disorder; Seizure (CMS-HCC) (2010); Pneumonia; Indigestion; and Unspecified disorder of thyroid.    SURGICAL HISTORY   has past surgical history that includes eye surgery; thyroid lobectomy; and other.    SOCIAL HISTORY  Social History   Substance Use Topics   • Smoking status: Former Smoker -- 0.25 packs/day for 4 years     Types: Cigarettes     Quit date: 01/01/2014   • Smokeless tobacco: Never Used   • Alcohol Use: No      History   Drug Use   • Yes     Comment: pot       FAMILY HISTORY  Non-Contributory    CURRENT MEDICATIONS  Home Medications     Reviewed by Trinity Lane R.N. (Registered Nurse) on 08/18/17 at 0149  Med List Status: Partial    Medication Last Dose Status    ALBUTEROL INH 5/27/2017 Active    divalproex ER (DEPAKOTE ER) 500 MG TABLET SR 24 HR 5/27/2017 Active    latanoprost (XALATAN) 0.005 % Solution 5/27/2017 Active    levothyroxine (SYNTHROID) 125 MCG Tab   "Active    prazosin (MINIPRESS) 2 MG Cap 5/27/2017 Active    sertraline (ZOLOFT) 100 MG Tab  Active                ALLERGIES  Allergies   Allergen Reactions   • Abilify Unspecified     \"Feeling tired, like I don't even know whats going on around me\"   • Fish        PHYSICAL EXAM  VITAL SIGNS: /78 mmHg  Pulse 80  Temp(Src) 36.7 °C (98 °F)  Resp 18  Ht 1.981 m (6' 6\")  Wt 139.1 kg (306 lb 10.6 oz)  BMI 35.45 kg/m2  SpO2 98%  Pulse ox interpretation: I interpret this pulse ox as normal.  Constitutional: Alert and oriented x 3, minimal Distress  HEENT: Atraumatic normocephalic, pupils are equal round reactive to light extraocular movements are intact. The nares is clear, external ears are normal, mouth shows moist mucous membranes, minor posterior pharyngeal erythema and tonsillar enlargement positive x-ray positive cervical lymphadenopathy.  Neck: Supple, no JVD no tracheal deviation  Cardiovascular: Regular rate and rhythm no murmur rub or gallop 2+ pulses peripherally x4  Thorax & Lungs: No respiratory distress, no wheezes rales or rhonchi, No chest tenderness.   GI: Soft nontender nondistended positive bowel sounds, no peritoneal signs    Skin: Warm dry no acute rash or lesion  Musculoskeletal: Moving all extremities with full range and 5 of 5 strength, no acute  deformity  Neurologic: Cranial nerves III through XII are grossly intact, no sensory deficit, no cerebellar dysfunction   Psychiatric: Appropriate affect for situation at this time      COURSE & MEDICAL DECISION MAKING  Pertinent Labs & Imaging studies reviewed. (See chart for details)    Medical Decision Making: Positive exudate positive erythema and cervical lymphadenopathy patient given IM Toradol 1st dose of amoxicillin here started on the same discharged home stable condition return for pain fevers difficulty swallowing breathing any other acute concerns otherwise discharged home in stable condition.  /78 mmHg  Pulse 80  Temp(Src) " "36.7 °C (98 °F)  Resp 18  Ht 1.981 m (6' 6\")  Wt 139.1 kg (306 lb 10.6 oz)  BMI 35.45 kg/m2  SpO2 98%    Long Linares M.D.  [address]  [address]  [place] [address]  [phone]      As needed    [facility], Emergency Dept  [address] [address] [phone]    If symptoms worsen        FINAL IMPRESSION  1. Strep pharyngitis           This dictation has been created using voice recognition software and/or scribes. The accuracy of the dictation is limited by the abilities of the software and the expertise of the scribes. I expect there may be some errors of grammar and possibly content. I made every attempt to manually correct the errors within my dictation. However, errors related to voice recognition software and/or scribes may still exist and should be interpreted within the appropriate context.              "

## 2017-08-18 NOTE — ED NOTES
"Trigae notes    Pt c/o sore throat causing pain to swallow started this morning.  Pt had a goiter removed 19 years ago.    .Informed of triage process. Awaiting room in triage lobby. Asked to return to triage desk with any questions or concerns.     ./75 mmHg  Pulse 65  Temp(Src) 36.6 °C (97.9 °F)  Resp 16  Ht 1.981 m (6' 6\")  Wt 139.1 kg (306 lb 10.6 oz)  BMI 35.45 kg/m2  SpO2 97%  .  Chief Complaint   Patient presents with   • Sore Throat     started today, hurts to [facility].  hx of goiter removal 19 years ago        "

## 2017-08-18 NOTE — ED AVS SNAPSHOT
8/18/2017    Norm Prabhakar  [address] [address]    Dear Norm:    [facility] wants to ensure your discharge home is safe and you or your loved ones have had all of your questions answered regarding your care after you leave the hospital.    Below is a list of resources and contact information should you have any questions regarding your hospital stay, follow-up instructions, or active medical symptoms.    Questions or Concerns Regarding… Contact   Medical Questions Related to Your Discharge  (7 days a week, 8am-5pm) Contact a Nurse Care Coordinator   [phone]   Medical Questions Not Related to Your Discharge  (24 hours a day / 7 days a week)  Contact the Nurse Health Line   [phone]    Medications or Discharge Instructions Refer to your discharge packet   or contact your [facility] Primary Care Provider   [phone]   Follow-up Appointment(s) Schedule your appointment via [employer]   or contact Scheduling [phone]   Billing Review your statement via [employer]  or contact Billing [phone]   Medical Records Review your records via [employer]   or contact Medical Records [phone]     You may receive a telephone call within two days of discharge. This call is to make certain you understand your discharge instructions and have the opportunity to have any questions answered. You can also easily access your medical information, test results and upcoming appointments via the [employer] free online health management tool. You can learn more and sign up at [employer]. For assistance setting up your [employer] account, please call [phone].    Once again, we want to ensure your discharge home is safe and that you have a clear understanding of any next steps in your care. If you have any questions or concerns, please do not hesitate to contact us, we are here for you. Thank you for choosing [facility] for your healthcare needs.    Sincerely,    Your [facility] Healthcare Team

## 2017-08-18 NOTE — ED NOTES
Pt discharge ordered by provider. Pt in agreement with plan. Pt discharge instructions and prescription reviewed with patient by this RN. Pt verbalized understanding of discharge instructions and prescription. Pt has no further questions at this time. Pt discharged alert, oriented, and ambulatory with a steady gait and no signs of distress when discharged.

## 2017-08-19 ENCOUNTER — PATIENT OUTREACH (OUTPATIENT)
Dept: HEALTH INFORMATION MANAGEMENT | Facility: OTHER | Age: 37
End: 2017-08-19

## 2017-08-24 ENCOUNTER — APPOINTMENT (OUTPATIENT)
Dept: RADIOLOGY | Facility: MEDICAL CENTER | Age: 37
End: 2017-08-24
Attending: EMERGENCY MEDICINE
Payer: MEDICAID

## 2017-08-24 ENCOUNTER — HOSPITAL ENCOUNTER (EMERGENCY)
Facility: MEDICAL CENTER | Age: 37
End: 2017-08-24
Attending: EMERGENCY MEDICINE
Payer: MEDICAID

## 2017-08-24 VITALS
DIASTOLIC BLOOD PRESSURE: 75 MMHG | TEMPERATURE: 97.5 F | HEART RATE: 76 BPM | BODY MASS INDEX: 35.4 KG/M2 | OXYGEN SATURATION: 96 % | WEIGHT: 306 LBS | HEIGHT: 78 IN | RESPIRATION RATE: 20 BRPM | SYSTOLIC BLOOD PRESSURE: 125 MMHG

## 2017-08-24 DIAGNOSIS — S63.502A LEFT WRIST SPRAIN, INITIAL ENCOUNTER: ICD-10-CM

## 2017-08-24 PROCEDURE — 99283 EMERGENCY DEPT VISIT LOW MDM: CPT

## 2017-08-24 PROCEDURE — 73110 X-RAY EXAM OF WRIST: CPT | Mod: LT

## 2017-08-24 ASSESSMENT — PAIN SCALES - GENERAL: PAINLEVEL_OUTOF10: 9

## 2017-08-24 NOTE — ED AVS SNAPSHOT
8/24/2017    Norm Prabhakar  [address] [address]    Dear Norm:    [facility] wants to ensure your discharge home is safe and you or your loved ones have had all of your questions answered regarding your care after you leave the hospital.    Below is a list of resources and contact information should you have any questions regarding your hospital stay, follow-up instructions, or active medical symptoms.    Questions or Concerns Regarding… Contact   Medical Questions Related to Your Discharge  (7 days a week, 8am-5pm) Contact a Nurse Care Coordinator   [phone]   Medical Questions Not Related to Your Discharge  (24 hours a day / 7 days a week)  Contact the Nurse Health Line   [phone]    Medications or Discharge Instructions Refer to your discharge packet   or contact your [facility] Primary Care Provider   [phone]   Follow-up Appointment(s) Schedule your appointment via [employer]   or contact Scheduling [phone]   Billing Review your statement via [employer]  or contact Billing [phone]   Medical Records Review your records via [employer]   or contact Medical Records [phone]     You may receive a telephone call within two days of discharge. This call is to make certain you understand your discharge instructions and have the opportunity to have any questions answered. You can also easily access your medical information, test results and upcoming appointments via the [employer] free online health management tool. You can learn more and sign up at [employer]. For assistance setting up your [employer] account, please call [phone].    Once again, we want to ensure your discharge home is safe and that you have a clear understanding of any next steps in your care. If you have any questions or concerns, please do not hesitate to contact us, we are here for you. Thank you for choosing [facility] for your healthcare needs.    Sincerely,    Your [facility] Healthcare Team

## 2017-08-24 NOTE — ED AVS SNAPSHOT
Home Care Instructions                                                                                                                Norm Prabhakar   MRN: [id number]    Department:  [facility], Emergency Dept   Date of Visit:  8/24/2017            [facility], Emergency Dept    [address]    [place] [address]    Phone:  [phone]      You were seen by     Florentino Redmond M.D.      Your Diagnosis Was     Left wrist sprain, initial encounter     S63.502A       Follow-up Information     1. Schedule an appointment as soon as possible for a visit with Mark Bowman M.D..    Specialty:  Orthopaedics    Contact information    [address]  [place] [address]  [phone]        Medication Information     Review all of your home medications and newly ordered medications with your primary doctor and/or pharmacist as soon as possible. Follow medication instructions as directed by your doctor and/or pharmacist.     Please keep your complete medication list with you and share with your physician. Update the information when medications are discontinued, doses are changed, or new medications (including over-the-counter products) are added; and carry medication information at all times in the event of emergency situations.               Medication List      ASK your doctor about these medications        Instructions    Morning Afternoon Evening Bedtime    ALBUTEROL INH        Inhale  by mouth.                        amoxicillin 500 MG Caps   Commonly known as:  AMOXIL        Doctor's comments:  This prescription is transmitted by a pharmacist under the authority of a collaborative practice agreement.   Take 1 Cap by mouth 3 times a day.   Dose:  500 mg                        divalproex  MG Tb24   Commonly known as:  DEPAKOTE ER        Take 3 Tabs by mouth every evening.   Dose:  1500 mg                        levothyroxine 125 MCG Tabs   Commonly known as:   "SYNTHROID        Take 1 Tab by mouth Every morning on an empty stomach.   Dose:  125 mcg                        sertraline 100 MG Tabs   Commonly known as:  ZOLOFT        Take 1 Tab by mouth every morning.   Dose:  100 mg                        XALATAN 0.005 % Soln   Generic drug:  latanoprost        Place 1 Drop in both eyes every evening.   Dose:  1 Drop                                Procedures and tests performed during your visit     DX-WRIST-COMPLETE 3+ LEFT        Discharge Instructions       Wrist Sprain  A wrist sprain is a stretch or tear in the strong, fibrous tissues (ligaments) that connect your wrist bones. The ligaments of your wrist may be easily sprained. There are three types of wrist sprains.  · Grade 1. The ligament is not stretched or torn, but the sprain causes pain.  · Grade 2. The ligament is stretched or partially torn. You may be able to move your wrist, but not very much.  · Grade 3. The ligament or muscle completely tears. You may find it difficult or extremely painful to move your wrist even a little.  CAUSES  Often, wrist sprains are a result of a fall or an injury. The force of the impact causes the fibers of your ligament to stretch too much or tear. Common causes of wrist sprains include:  · Overextending your wrist while catching a ball with your hands.  · Repetitive or strenuous extension or bending of your wrist.  · Landing on your hand during a fall.  RISK FACTORS  · Having previous wrist injuries.  · Playing contact sports, such as boxing or wrestling.  · Participating in activities in which falling is common.  · Having poor wrist strength and flexibility.  SIGNS AND SYMPTOMS  · Wrist pain.  · Wrist tenderness.  · Inflammation or bruising of the wrist area.  · Hearing a \"pop\" or feeling a tear at the time of the injury.  · Decreased wrist movement due to pain, stiffness, or weakness.  DIAGNOSIS  Your health care provider will examine your wrist. In some cases, an X-ray will be " taken to make sure you did not break any bones. If your health care provider thinks that you tore a ligament, he or she may order an MRI of your wrist.  TREATMENT  Treatment involves resting and icing your wrist. You may also need to take pain medicines to help lessen pain and inflammation. Your health care provider may recommend keeping your wrist still (immobilized) with a splint to help your sprain heal. When the splint is no longer necessary, you may need to perform strengthening and stretching exercises. These exercises help you to regain strength and full range of motion in your wrist. Surgery is not usually needed for wrist sprains unless the ligament completely tears.  HOME CARE INSTRUCTIONS  · Rest your wrist. Do not do things that cause pain.  · Wear your wrist splint as directed by your health care provider.  · Take medicines only as directed by your health care provider.  · To ease pain and swelling, apply ice to the injured area.  ¨ Put ice in a plastic bag.  ¨ Place a towel between your skin and the bag.  ¨ Leave the ice on for 20 minutes, 2-3 times a day.  SEEK MEDICAL CARE IF:  · Your pain, discomfort, or swelling gets worse even with treatment.  · You feel sudden numbness in your hand.     This information is not intended to replace advice given to you by your health care provider. Make sure you discuss any questions you have with your health care provider.     Document Released: 08/21/2015 Document Reviewed: 08/21/2015  Elsevier Interactive Patient Education ©2016 [employer]            Patient Information     Patient Information    Following emergency treatment: all patient requiring follow-up care must return either to a private physician or a clinic if your condition worsens before you are able to obtain further medical attention, please return to the emergency room.     Billing Information    At [facility], we work to make the billing process streamlined for our patients.  Our  Representatives are here to answer any questions you may have regarding your hospital bill.  If you have insurance coverage and have supplied your insurance information to us, we will submit a claim to your insurer on your behalf.  Should you have any questions regarding your bill, we can be reached online or by phone as follows:  Online: You are able pay your bills online or live chat with our representatives about any billing questions you may have. We are here to help Monday - Friday from 8:00am to 7:30pm and 9:00am - 12:00pm on Saturdays.  Please visit [email or web address]  for more information.   Phone:  [phone] or [phone]    Please note that your emergency physician, surgeon, pathologist, radiologist, anesthesiologist, and other specialists are not employed by [facility] and will therefore bill separately for their services.  Please contact them directly for any questions concerning their bills at the numbers below:     Emergency Physician Services:  [phone]  [facility]:  [phone]  [facility]:  [phone]  [facility]:  [phone]    1. Your final bill may vary from the amount quoted upon discharge if all procedures are not complete at that time, or if your doctor has additional procedures of which we are not aware. You will receive an additional bill if you return to the Emergency Department at [facility] for suture removal regardless of the facility of which the sutures were placed.     2. Please arrange for settlement of this account at the emergency registration.    3. All self-pay accounts are due in full at the time of treatment.  If you are unable to meet this obligation then payment is expected within 4-5 days.     4. If you have had radiology studies (CT, X-ray, Ultrasound, MRI), you have received a preliminary result during your emergency department visit. Please contact the radiology  department [phone] to receive a copy of your final result. Please discuss the Final result with your primary physician or with the follow up physician provided.     Crisis Hotline:  [place] Crisis Hotline:  5-519-YAETALR or 1-410.448.4744  [place] Crisis Hotline:    1-792.103.8140 or 670-741-1318         ED Discharge Follow Up Questions    1. In order to provide you with very good care, we would like to follow up with a phone call in the next few days.  May we have your permission to contact you?     YES /  NO    2. What is the best phone number to call you? (       )_____-__________    3. What is the best time to call you?      Morning  /  Afternoon  /  Evening                   Patient Signature:  ____________________________________________________________    Date:  ____________________________________________________________

## 2017-08-24 NOTE — ED AVS SNAPSHOT
Nexant Access Code: [email or web address]  Expires: 9/8/2017  2:44 AM    Nexant  A secure, online tool to manage your health information     [employer]’s Nexant® is a secure, online tool that connects you to your personalized health information from the privacy of your home -- day or night - making it very easy for you to manage your healthcare. Once the activation process is completed, you can even access your medical information using the Nexant jodi, which is available for free in the Apple Jodi store or Google Play store.     Nexant provides the following levels of access (as shown below):   My Chart Features   [facility] Primary Care Doctor [facility]  Specialists [facility]  Urgent  Care Non-[facility]  Primary Care  Doctor   Email your healthcare team securely and privately 24/7 X X X X   Manage appointments: schedule your next appointment; view details of past/upcoming appointments X      Request prescription refills. X      View recent personal medical records, including lab and immunizations X X X X   View health record, including health history, allergies, medications X X X X   Read reports about your outpatient visits, procedures, consult and ER notes X X X X   See your discharge summary, which is a recap of your hospital and/or ER visit that includes your diagnosis, lab results, and care plan. X X       How to register for Nexant:  1. Go to  [email or web address].  2. Click on the Sign Up Now box, which takes you to the New Member Sign Up page. You will need to provide the following information:  a. Enter your Nexant Access Code exactly as it appears at the top of this page. (You will not need to use this code after you’ve completed the sign-up process. If you do not sign up before the expiration date, you must request a new code.)   b. Enter your date of birth.   c. Enter your home email address.   d. Click Submit, and follow the next screen’s instructions.  3. Create a Nexant ID. This will be your Nexant  login ID and cannot be changed, so think of one that is secure and easy to remember.  4. Create a [email or web address] password. You can change your password at any time.  5. Enter your Password Reset Question and Answer. This can be used at a later time if you forget your password.   6. Enter your e-mail address. This allows you to receive e-mail notifications when new information is available in [email or web address].  7. Click Sign Up. You can now view your health information.    For assistance activating your [email or web address] account, call [phone]

## 2017-08-25 ENCOUNTER — PATIENT OUTREACH (OUTPATIENT)
Dept: HEALTH INFORMATION MANAGEMENT | Facility: OTHER | Age: 37
End: 2017-08-25

## 2017-08-25 NOTE — ED NOTES
.Norm Prabhakar  .  Chief Complaint   Patient presents with   • Wrist Pain     patient c/o left wrist since 1500 after. patient reports he fell forward, tried to break his fall and landed on his wrist.      Patient to triage with above complaint. No obvious deformity noted. + CMS. + Pulse ./75 mmHg  Pulse 76  Temp(Src) 36.4 °C (97.5 °F) (Temporal)  Resp 20  Wt 138.8 kg (306 lb)  SpO2 96%      Patient to lobby and instructed to inform staff of any needs.

## 2017-08-25 NOTE — ED PROVIDER NOTES
"ED Provider Note    CHIEF COMPLAINT  Chief Complaint   Patient presents with   • Wrist Pain     patient c/o left wrist since 1500 after  he fell forward, tried to break his fall and landed on his wrist.        HPI  Norm Prabhakar is a 37 y.o. male who presents For evaluation of left wrist injury, fell forward landing on outstretched hand, the weakness numbness or tingling, no back or neck pain, no other complaints. He reports the pain is diffuse across the wrist joint, worsened with movement.    REVIEW OF SYSTEMS  Negative for weakness, numbness, tingling, neck pain back pain.     PAST MEDICAL HISTORY   has a past medical history of ASTHMA; Glaucoma; Hypothyroidism; Depression; Anxiety; Seizure disorder (CMS-HCC); Bipolar 1 disorder (CMS-HCC); S/P thyroidectomy; Murmur; Fall; Glaucoma (1982); Psychiatric problem (2002); Mental disorder; Seizure (CMS-HCC) (2010); Pneumonia; Indigestion; and Unspecified disorder of thyroid.    SOCIAL HISTORY  Social History     Social History Main Topics   • Smoking status: Former Smoker -- 0.25 packs/day for 4 years     Types: Cigarettes     Quit date: 01/01/2014   • Smokeless tobacco: Never Used   • Alcohol Use: No   • Drug Use: Yes      Comment: pot   • Sexual Activity: Not on file       SURGICAL HISTORY   has past surgical history that includes eye surgery; thyroid lobectomy; and other.    CURRENT MEDICATIONS  I personally reviewed the medication list in the charting documentation.     ALLERGIES  Allergies   Allergen Reactions   • Abilify Unspecified     \"Feeling tired, like I don't even know whats going on around me\"   • Fish        PHYSICAL EXAM  VITAL SIGNS: /75 mmHg  Pulse 76  Temp(Src) 36.4 °C (97.5 °F) (Temporal)  Resp 20  Wt 138.8 kg (306 lb)  SpO2 96%  Constitutional: Alert in no apparent distress.  HENT: No signs of trauma.   Eyes: Conjunctiva normal, Non-icteric.   Chest: Normal nonlabored respirations  Skin: No erythema, No rash. "   Musculoskeletal:Unremarkable inspection of the left wrist with no obvious edema or deformity, neurovascularly intact, diffuse tenderness with limited range of motion.  Neurologic: Alert, No focal deficits noted.   Psychiatric: Affect normal, Judgment normal.    DIAGNOSTIC STUDIES / PROCEDURES    RADIOLOGY    DX-WRIST-COMPLETE 3+ LEFT   Final Result         1.  No acute traumatic bony injury.            COURSE & MEDICAL DECISION MAKING  Pertinent Labs & Imaging studies reviewed. (See chart for details)    Encounter Summary: This is a 37 y.o. male with a wrist injury after a fall on outstretched hand, neurovascularly intact on exam, x-ray rules out obvious fracture. Will be placed in a Velcro wrist splint, he will follow up with orthopedics for further evaluation.      DISPOSITION: Discharge Home      FINAL IMPRESSION  1. Left wrist sprain, initial encounter        This dictation was created using voice recognition software. The accuracy of the dictation is limited to the abilities of the software. I expect there may be some errors of grammar and possibly content. The nursing notes were reviewed and certain aspects of this information were incorporated into this note.    Electronically signed by: Florentino Redmond, 8/24/2017 8:49 PM

## 2017-08-25 NOTE — ED NOTES
Pt ambulatory to [address].  Agree with triage note. Pt states pain is currently 9/10. Pt states fell happened around 1500 today.  Chart up and ready for ERP.

## 2017-08-25 NOTE — ED NOTES
Pt discharged home. Explained discharge instructions. Questions and comments addressed. Pt verbalized understanding of instructions. Pt advised to follow-up with Orthopedic or return to ED for any new or worsening of symptoms. Pt is ambulating well and steady on feet. VS stable. Pt ambulatory to ED lobby now.

## 2017-08-25 NOTE — DISCHARGE INSTRUCTIONS
"Wrist Sprain  A wrist sprain is a stretch or tear in the strong, fibrous tissues (ligaments) that connect your wrist bones. The ligaments of your wrist may be easily sprained. There are three types of wrist sprains.  · Grade 1. The ligament is not stretched or torn, but the sprain causes pain.  · Grade 2. The ligament is stretched or partially torn. You may be able to move your wrist, but not very much.  · Grade 3. The ligament or muscle completely tears. You may find it difficult or extremely painful to move your wrist even a little.  CAUSES  Often, wrist sprains are a result of a fall or an injury. The force of the impact causes the fibers of your ligament to stretch too much or tear. Common causes of wrist sprains include:  · Overextending your wrist while catching a ball with your hands.  · Repetitive or strenuous extension or bending of your wrist.  · Landing on your hand during a fall.  RISK FACTORS  · Having previous wrist injuries.  · Playing contact sports, such as boxing or wrestling.  · Participating in activities in which falling is common.  · Having poor wrist strength and flexibility.  SIGNS AND SYMPTOMS  · Wrist pain.  · Wrist tenderness.  · Inflammation or bruising of the wrist area.  · Hearing a \"pop\" or feeling a tear at the time of the injury.  · Decreased wrist movement due to pain, stiffness, or weakness.  DIAGNOSIS  Your health care provider will examine your wrist. In some cases, an X-ray will be taken to make sure you did not break any bones. If your health care provider thinks that you tore a ligament, he or she may order an MRI of your wrist.  TREATMENT  Treatment involves resting and icing your wrist. You may also need to take pain medicines to help lessen pain and inflammation. Your health care provider may recommend keeping your wrist still (immobilized) with a splint to help your sprain heal. When the splint is no longer necessary, you may need to perform strengthening and stretching " exercises. These exercises help you to regain strength and full range of motion in your wrist. Surgery is not usually needed for wrist sprains unless the ligament completely tears.  HOME CARE INSTRUCTIONS  · Rest your wrist. Do not do things that cause pain.  · Wear your wrist splint as directed by your health care provider.  · Take medicines only as directed by your health care provider.  · To ease pain and swelling, apply ice to the injured area.  ¨ Put ice in a plastic bag.  ¨ Place a towel between your skin and the bag.  ¨ Leave the ice on for 20 minutes, 2-3 times a day.  SEEK MEDICAL CARE IF:  · Your pain, discomfort, or swelling gets worse even with treatment.  · You feel sudden numbness in your hand.     This information is not intended to replace advice given to you by your health care provider. Make sure you discuss any questions you have with your health care provider.     Document Released: 08/21/2015 Document Reviewed: 08/21/2015  [employer] Interactive Patient Education ©2016 Elsevier Inc.

## 2017-09-12 ENCOUNTER — APPOINTMENT (OUTPATIENT)
Dept: RADIOLOGY | Facility: MEDICAL CENTER | Age: 37
DRG: 057 | End: 2017-09-12
Attending: EMERGENCY MEDICINE
Payer: MEDICAID

## 2017-09-12 ENCOUNTER — RESOLUTE PROFESSIONAL BILLING HOSPITAL PROF FEE (OUTPATIENT)
Dept: HOSPITALIST | Facility: MEDICAL CENTER | Age: 37
End: 2017-09-12
Payer: MEDICAID

## 2017-09-12 ENCOUNTER — HOSPITAL ENCOUNTER (INPATIENT)
Facility: MEDICAL CENTER | Age: 37
LOS: 1 days | DRG: 057 | End: 2017-09-13
Attending: EMERGENCY MEDICINE | Admitting: INTERNAL MEDICINE
Payer: MEDICAID

## 2017-09-12 DIAGNOSIS — G83.21 PARALYSIS OF RIGHT UPPER EXTREMITY (HCC): ICD-10-CM

## 2017-09-12 DIAGNOSIS — G83.11 PARALYSIS OF RIGHT LOWER EXTREMITY (HCC): ICD-10-CM

## 2017-09-12 DIAGNOSIS — G81.91 RIGHT HEMIPARESIS (HCC): ICD-10-CM

## 2017-09-12 PROBLEM — R73.9 HYPERGLYCEMIA: Status: ACTIVE | Noted: 2017-09-12

## 2017-09-12 LAB
ALBUMIN SERPL BCP-MCNC: 4.5 G/DL (ref 3.2–4.9)
ALBUMIN/GLOB SERPL: 1.7 G/DL
ALP SERPL-CCNC: 59 U/L (ref 30–99)
ALT SERPL-CCNC: 18 U/L (ref 2–50)
ANION GAP SERPL CALC-SCNC: 13 MMOL/L (ref 0–11.9)
AST SERPL-CCNC: 18 U/L (ref 12–45)
BASOPHILS # BLD AUTO: 1 % (ref 0–1.8)
BASOPHILS # BLD: 0.12 K/UL (ref 0–0.12)
BILIRUB SERPL-MCNC: 0.4 MG/DL (ref 0.1–1.5)
BUN SERPL-MCNC: 22 MG/DL (ref 8–22)
CALCIUM SERPL-MCNC: 9.9 MG/DL (ref 8.5–10.5)
CHLORIDE SERPL-SCNC: 100 MMOL/L (ref 96–112)
CO2 SERPL-SCNC: 22 MMOL/L (ref 20–33)
CREAT SERPL-MCNC: 0.94 MG/DL (ref 0.5–1.4)
EKG IMPRESSION: NORMAL
EOSINOPHIL # BLD AUTO: 0.25 K/UL (ref 0–0.51)
EOSINOPHIL NFR BLD: 2.1 % (ref 0–6.9)
ERYTHROCYTE [DISTWIDTH] IN BLOOD BY AUTOMATED COUNT: 42.9 FL (ref 35.9–50)
EST. AVERAGE GLUCOSE BLD GHB EST-MCNC: 157 MG/DL
GFR SERPL CREATININE-BSD FRML MDRD: >60 ML/MIN/1.73 M 2
GLOBULIN SER CALC-MCNC: 2.6 G/DL (ref 1.9–3.5)
GLUCOSE SERPL-MCNC: 147 MG/DL (ref 65–99)
HBA1C MFR BLD: 7.1 % (ref 0–5.6)
HCT VFR BLD AUTO: 41.1 % (ref 42–52)
HGB BLD-MCNC: 14.2 G/DL (ref 14–18)
IMM GRANULOCYTES # BLD AUTO: 0.08 K/UL (ref 0–0.11)
IMM GRANULOCYTES NFR BLD AUTO: 0.7 % (ref 0–0.9)
INR PPP: 0.95 (ref 0.87–1.13)
LYMPHOCYTES # BLD AUTO: 4.02 K/UL (ref 1–4.8)
LYMPHOCYTES NFR BLD: 34 % (ref 22–41)
MCH RBC QN AUTO: 30.8 PG (ref 27–33)
MCHC RBC AUTO-ENTMCNC: 34.5 G/DL (ref 33.7–35.3)
MCV RBC AUTO: 89.2 FL (ref 81.4–97.8)
MONOCYTES # BLD AUTO: 0.97 K/UL (ref 0–0.85)
MONOCYTES NFR BLD AUTO: 8.2 % (ref 0–13.4)
NEUTROPHILS # BLD AUTO: 6.4 K/UL (ref 1.82–7.42)
NEUTROPHILS NFR BLD: 54 % (ref 44–72)
NRBC # BLD AUTO: 0 K/UL
NRBC BLD AUTO-RTO: 0 /100 WBC
PLATELET # BLD AUTO: 356 K/UL (ref 164–446)
PMV BLD AUTO: 10.1 FL (ref 9–12.9)
POTASSIUM SERPL-SCNC: 3.6 MMOL/L (ref 3.6–5.5)
PROT SERPL-MCNC: 7.1 G/DL (ref 6–8.2)
PROTHROMBIN TIME: 13 SEC (ref 12–14.6)
RBC # BLD AUTO: 4.61 M/UL (ref 4.7–6.1)
SODIUM SERPL-SCNC: 135 MMOL/L (ref 135–145)
TROPONIN I SERPL-MCNC: <0.01 NG/ML (ref 0–0.04)
WBC # BLD AUTO: 11.8 K/UL (ref 4.8–10.8)

## 2017-09-12 PROCEDURE — A9270 NON-COVERED ITEM OR SERVICE: HCPCS | Performed by: INTERNAL MEDICINE

## 2017-09-12 PROCEDURE — 770020 HCHG ROOM/CARE - TELE (206)

## 2017-09-12 PROCEDURE — 97161 PT EVAL LOW COMPLEX 20 MIN: CPT

## 2017-09-12 PROCEDURE — 83036 HEMOGLOBIN GLYCOSYLATED A1C: CPT

## 2017-09-12 PROCEDURE — 700117 HCHG RX CONTRAST REV CODE 255: Performed by: EMERGENCY MEDICINE

## 2017-09-12 PROCEDURE — G8987 SELF CARE CURRENT STATUS: HCPCS | Mod: CI

## 2017-09-12 PROCEDURE — 92610 EVALUATE SWALLOWING FUNCTION: CPT

## 2017-09-12 PROCEDURE — 99285 EMERGENCY DEPT VISIT HI MDM: CPT

## 2017-09-12 PROCEDURE — 80053 COMPREHEN METABOLIC PANEL: CPT

## 2017-09-12 PROCEDURE — G8997 SWALLOW GOAL STATUS: HCPCS | Mod: CH

## 2017-09-12 PROCEDURE — 700102 HCHG RX REV CODE 250 W/ 637 OVERRIDE(OP): Performed by: INTERNAL MEDICINE

## 2017-09-12 PROCEDURE — 85610 PROTHROMBIN TIME: CPT

## 2017-09-12 PROCEDURE — 85025 COMPLETE CBC W/AUTO DIFF WBC: CPT

## 2017-09-12 PROCEDURE — 70498 CT ANGIOGRAPHY NECK: CPT

## 2017-09-12 PROCEDURE — 97165 OT EVAL LOW COMPLEX 30 MIN: CPT

## 2017-09-12 PROCEDURE — 93005 ELECTROCARDIOGRAM TRACING: CPT | Performed by: EMERGENCY MEDICINE

## 2017-09-12 PROCEDURE — G8998 SWALLOW D/C STATUS: HCPCS | Mod: CH

## 2017-09-12 PROCEDURE — G8978 MOBILITY CURRENT STATUS: HCPCS | Mod: CL

## 2017-09-12 PROCEDURE — 700101 HCHG RX REV CODE 250: Performed by: INTERNAL MEDICINE

## 2017-09-12 PROCEDURE — 70496 CT ANGIOGRAPHY HEAD: CPT

## 2017-09-12 PROCEDURE — 36415 COLL VENOUS BLD VENIPUNCTURE: CPT

## 2017-09-12 PROCEDURE — G8988 SELF CARE GOAL STATUS: HCPCS | Mod: CI

## 2017-09-12 PROCEDURE — 93005 ELECTROCARDIOGRAM TRACING: CPT

## 2017-09-12 PROCEDURE — 95951 EEG: CPT | Mod: 52

## 2017-09-12 PROCEDURE — 99223 1ST HOSP IP/OBS HIGH 75: CPT | Performed by: INTERNAL MEDICINE

## 2017-09-12 PROCEDURE — G8979 MOBILITY GOAL STATUS: HCPCS | Mod: CI

## 2017-09-12 PROCEDURE — G8996 SWALLOW CURRENT STATUS: HCPCS | Mod: CH

## 2017-09-12 PROCEDURE — 94760 N-INVAS EAR/PLS OXIMETRY 1: CPT

## 2017-09-12 PROCEDURE — 84484 ASSAY OF TROPONIN QUANT: CPT

## 2017-09-12 RX ORDER — LABETALOL HYDROCHLORIDE 5 MG/ML
10 INJECTION, SOLUTION INTRAVENOUS EVERY 4 HOURS PRN
Status: DISCONTINUED | OUTPATIENT
Start: 2017-09-12 | End: 2017-09-13 | Stop reason: HOSPADM

## 2017-09-12 RX ORDER — ASPIRIN 300 MG/1
300 SUPPOSITORY RECTAL DAILY
Status: DISCONTINUED | OUTPATIENT
Start: 2017-09-12 | End: 2017-09-13 | Stop reason: HOSPADM

## 2017-09-12 RX ORDER — POLYETHYLENE GLYCOL 3350 17 G/17G
1 POWDER, FOR SOLUTION ORAL
Status: DISCONTINUED | OUTPATIENT
Start: 2017-09-12 | End: 2017-09-13 | Stop reason: HOSPADM

## 2017-09-12 RX ORDER — LATANOPROST 50 UG/ML
1 SOLUTION/ DROPS OPHTHALMIC NIGHTLY
Status: DISCONTINUED | OUTPATIENT
Start: 2017-09-12 | End: 2017-09-13 | Stop reason: HOSPADM

## 2017-09-12 RX ORDER — SODIUM CHLORIDE AND POTASSIUM CHLORIDE 150; 900 MG/100ML; MG/100ML
INJECTION, SOLUTION INTRAVENOUS CONTINUOUS
Status: DISCONTINUED | OUTPATIENT
Start: 2017-09-12 | End: 2017-09-13 | Stop reason: HOSPADM

## 2017-09-12 RX ORDER — LABETALOL HYDROCHLORIDE 5 MG/ML
5 INJECTION, SOLUTION INTRAVENOUS EVERY 4 HOURS PRN
Status: DISCONTINUED | OUTPATIENT
Start: 2017-09-12 | End: 2017-09-12

## 2017-09-12 RX ORDER — HYDRALAZINE HYDROCHLORIDE 20 MG/ML
10 INJECTION INTRAMUSCULAR; INTRAVENOUS
Status: DISCONTINUED | OUTPATIENT
Start: 2017-09-12 | End: 2017-09-13 | Stop reason: HOSPADM

## 2017-09-12 RX ORDER — AMOXICILLIN 250 MG
2 CAPSULE ORAL 2 TIMES DAILY
Status: DISCONTINUED | OUTPATIENT
Start: 2017-09-12 | End: 2017-09-13 | Stop reason: HOSPADM

## 2017-09-12 RX ORDER — ASPIRIN 325 MG
325 TABLET ORAL DAILY
Status: DISCONTINUED | OUTPATIENT
Start: 2017-09-12 | End: 2017-09-13 | Stop reason: HOSPADM

## 2017-09-12 RX ORDER — SERTRALINE HYDROCHLORIDE 100 MG/1
100 TABLET, FILM COATED ORAL EVERY MORNING
Status: DISCONTINUED | OUTPATIENT
Start: 2017-09-12 | End: 2017-09-13 | Stop reason: HOSPADM

## 2017-09-12 RX ORDER — ASPIRIN 81 MG/1
324 TABLET, CHEWABLE ORAL DAILY
Status: DISCONTINUED | OUTPATIENT
Start: 2017-09-12 | End: 2017-09-13 | Stop reason: HOSPADM

## 2017-09-12 RX ORDER — DIVALPROEX SODIUM 500 MG/1
1500 TABLET, EXTENDED RELEASE ORAL EVERY EVENING
Status: DISCONTINUED | OUTPATIENT
Start: 2017-09-12 | End: 2017-09-13 | Stop reason: HOSPADM

## 2017-09-12 RX ORDER — ACETAMINOPHEN 325 MG/1
650 TABLET ORAL EVERY 6 HOURS PRN
Status: DISCONTINUED | OUTPATIENT
Start: 2017-09-12 | End: 2017-09-13 | Stop reason: HOSPADM

## 2017-09-12 RX ORDER — BISACODYL 10 MG
10 SUPPOSITORY, RECTAL RECTAL
Status: DISCONTINUED | OUTPATIENT
Start: 2017-09-12 | End: 2017-09-13 | Stop reason: HOSPADM

## 2017-09-12 RX ORDER — LEVOTHYROXINE SODIUM 0.03 MG/1
125 TABLET ORAL
Status: DISCONTINUED | OUTPATIENT
Start: 2017-09-12 | End: 2017-09-13 | Stop reason: HOSPADM

## 2017-09-12 RX ADMIN — ASPIRIN 325 MG: 325 TABLET, COATED ORAL at 09:40

## 2017-09-12 RX ADMIN — STANDARDIZED SENNA CONCENTRATE AND DOCUSATE SODIUM 2 TABLET: 8.6; 5 TABLET, FILM COATED ORAL at 09:40

## 2017-09-12 RX ADMIN — LEVOTHYROXINE SODIUM 125 MCG: 25 TABLET ORAL at 09:39

## 2017-09-12 RX ADMIN — ACETAMINOPHEN 650 MG: 325 TABLET, FILM COATED ORAL at 20:41

## 2017-09-12 RX ADMIN — IOHEXOL 100 ML: 350 INJECTION, SOLUTION INTRAVENOUS at 02:53

## 2017-09-12 RX ADMIN — LATANOPROST 1 DROP: 50 SOLUTION OPHTHALMIC at 20:34

## 2017-09-12 RX ADMIN — DIVALPROEX SODIUM 1500 MG: 500 TABLET, EXTENDED RELEASE ORAL at 20:34

## 2017-09-12 RX ADMIN — POTASSIUM CHLORIDE AND SODIUM CHLORIDE: 900; 150 INJECTION, SOLUTION INTRAVENOUS at 06:48

## 2017-09-12 RX ADMIN — SERTRALINE 100 MG: 100 TABLET, FILM COATED ORAL at 09:40

## 2017-09-12 ASSESSMENT — COGNITIVE AND FUNCTIONAL STATUS - GENERAL
DAILY ACTIVITIY SCORE: 12
STANDING UP FROM CHAIR USING ARMS: A LOT
EATING MEALS: A LOT
DRESSING REGULAR LOWER BODY CLOTHING: A LOT
WALKING IN HOSPITAL ROOM: TOTAL
SUGGESTED CMS G CODE MODIFIER MOBILITY: CL
WALKING IN HOSPITAL ROOM: TOTAL
WALKING IN HOSPITAL ROOM: A LOT
TURNING FROM BACK TO SIDE WHILE IN FLAT BAD: A LITTLE
HELP NEEDED FOR BATHING: A LITTLE
SUGGESTED CMS G CODE MODIFIER DAILY ACTIVITY: CL
MOBILITY SCORE: 12
HELP NEEDED FOR BATHING: A LOT
DRESSING REGULAR UPPER BODY CLOTHING: A LOT
PERSONAL GROOMING: A LITTLE
EATING MEALS: A LOT
HELP NEEDED FOR BATHING: A LOT
MOVING FROM LYING ON BACK TO SITTING ON SIDE OF FLAT BED: A LOT
PERSONAL GROOMING: A LOT
STANDING UP FROM CHAIR USING ARMS: A LOT
STANDING UP FROM CHAIR USING ARMS: A LOT
DAILY ACTIVITIY SCORE: 21
SUGGESTED CMS G CODE MODIFIER DAILY ACTIVITY: CL
MOVING TO AND FROM BED TO CHAIR: A LOT
DRESSING REGULAR LOWER BODY CLOTHING: A LOT
DRESSING REGULAR UPPER BODY CLOTHING: A LOT
TURNING FROM BACK TO SIDE WHILE IN FLAT BAD: A LITTLE
SUGGESTED CMS G CODE MODIFIER MOBILITY: CL
MOVING FROM LYING ON BACK TO SITTING ON SIDE OF FLAT BED: A LOT
CLIMB 3 TO 5 STEPS WITH RAILING: TOTAL
DAILY ACTIVITIY SCORE: 12
MOVING TO AND FROM BED TO CHAIR: A LOT
CLIMB 3 TO 5 STEPS WITH RAILING: TOTAL
PERSONAL GROOMING: A LOT
TURNING FROM BACK TO SIDE WHILE IN FLAT BAD: A LITTLE
MOBILITY SCORE: 11
MOVING TO AND FROM BED TO CHAIR: A LOT
CLIMB 3 TO 5 STEPS WITH RAILING: TOTAL
SUGGESTED CMS G CODE MODIFIER DAILY ACTIVITY: CJ
MOBILITY SCORE: 11
SUGGESTED CMS G CODE MODIFIER MOBILITY: CL
DRESSING REGULAR LOWER BODY CLOTHING: A LITTLE
TOILETING: A LOT
MOVING FROM LYING ON BACK TO SITTING ON SIDE OF FLAT BED: A LOT
TOILETING: A LOT

## 2017-09-12 ASSESSMENT — LIFESTYLE VARIABLES
TOTAL SCORE: 0
HOW MANY TIMES IN THE PAST YEAR HAVE YOU HAD 5 OR MORE DRINKS IN A DAY: 0
AVERAGE NUMBER OF DAYS PER WEEK YOU HAVE A DRINK CONTAINING ALCOHOL: 0
CONSUMPTION TOTAL: NEGATIVE
ALCOHOL_USE: YES
EVER HAD A DRINK FIRST THING IN THE MORNING TO STEADY YOUR NERVES TO GET RID OF A HANGOVER: NO
DO YOU DRINK ALCOHOL: NO
ON A TYPICAL DAY WHEN YOU DRINK ALCOHOL HOW MANY DRINKS DO YOU HAVE: 1
HAVE YOU EVER FELT YOU SHOULD CUT DOWN ON YOUR DRINKING: NO
TOTAL SCORE: 0
HAVE PEOPLE ANNOYED YOU BY CRITICIZING YOUR DRINKING: NO
EVER FELT BAD OR GUILTY ABOUT YOUR DRINKING: NO
EVER_SMOKED: YES
TOTAL SCORE: 0
PACK_YEARS: 17

## 2017-09-12 ASSESSMENT — GAIT ASSESSMENTS: GAIT LEVEL OF ASSIST: UNABLE TO PARTICIPATE

## 2017-09-12 ASSESSMENT — PAIN SCALES - GENERAL
PAINLEVEL_OUTOF10: 0

## 2017-09-12 ASSESSMENT — PATIENT HEALTH QUESTIONNAIRE - PHQ9
1. LITTLE INTEREST OR PLEASURE IN DOING THINGS: NOT AT ALL
2. FEELING DOWN, DEPRESSED, IRRITABLE, OR HOPELESS: NOT AT ALL
SUM OF ALL RESPONSES TO PHQ9 QUESTIONS 1 AND 2: 0
SUM OF ALL RESPONSES TO PHQ QUESTIONS 1-9: 0

## 2017-09-12 ASSESSMENT — ACTIVITIES OF DAILY LIVING (ADL): TOILETING: INDEPENDENT

## 2017-09-12 NOTE — PROGRESS NOTES
2 RN skin check complete. Light red raised rash noted to BUE/groin area. Pt states this has been there for some time and was an allergic reaction to Abilify. Skin is intact.

## 2017-09-12 NOTE — H&P
Hospital Medicine History and Physical    Date of Service  9/12/2017    Chief Complaint  Chief Complaint   Patient presents with   • Possible Stroke   • GLF     x 2 today   • Syncope       History of Presenting Illness  Norm Prabhakar is a 37 y.o. maleWith past medical history of sex disorder, conversion disorder, history of right-sided hemiparesis without organic disorder identified, here for evaluation of right sided paralysis. The pt states his paralysis started at 11pm last night, suddenly.   in the ER CT head negative. Neurologist was consulted and recommended no TPA. Recommend patient to be admitted to the hospital for further MRI evaluation. Patient otherwise denies fever, chills, nausea, vomiting, adb pain, SOB, CP, headache, constipation, diarrhea, cough, or sputum.    He states it is only right sided.  No left side involvement.   Primary Care Physician  Long Linares M.D.    Consultants  neuro    Code Status  full    Review of Systems      per HPI otherwise 14 points reviewed 12 systems neg per AMA/CMS criteria.           Past Medical History  Past Medical History:   Diagnosis Date   • Seizure (CMS-[facility]) 2010   • Psychiatric problem 2002    PTSD   • Anxiety     BIPOLAR   • ASTHMA    • Bipolar 1 disorder (CMS-HCC)    • Depression    • Fall     passed out 2 wks ago   • Glaucoma    • Glaucoma 1982    both eyes/ blind on left eye   • Hypothyroidism    • Indigestion     once in a while   • Mental disorder     learning disabilities; speech impairment; developmental delays   • Murmur     since birth   • Pneumonia     remote   • S/P thyroidectomy    • Seizure disorder (CMS-HCC)    • Unspecified disorder of thyroid        Surgical History  Past Surgical History:   Procedure Laterality Date   • EYE SURGERY     • OTHER      Hernia Repair when he was 10 yrs old   • THYROID LOBECTOMY         Medications  No current facility-administered medications on file prior to encounter.      Current Outpatient Prescriptions  "on File Prior to Encounter   Medication Sig Dispense Refill   • levothyroxine (SYNTHROID) 125 MCG Tab Take 1 Tab by mouth Every morning on an empty stomach. 30 Tab 0   • sertraline (ZOLOFT) 100 MG Tab Take 1 Tab by mouth every morning. 30 Tab 0   • latanoprost (XALATAN) 0.005 % Solution Place 1 Drop in both eyes every evening.     • divalproex ER (DEPAKOTE ER) 500 MG TABLET SR 24 HR Take 3 Tabs by mouth every evening. 90 Tab 1       Family History  Family History   Problem Relation Age of Onset   • Hypertension Mother    • Heart Disease Mother    • Lung Disease Mother    • Stroke Maternal Grandmother        Social History  Social History   Substance Use Topics   • Smoking status: Former Smoker     Packs/day: 0.25     Years: 4.00     Types: Cigarettes     Quit date: 1/1/2014   • Smokeless tobacco: Never Used   • Alcohol use No       Allergies  Allergies   Allergen Reactions   • Abilify Unspecified     \"Feeling tired, like I don't even know whats going on around me\"   • Fish         Physical Exam  Laboratory   Physical Exam:   Vitals/ General Appearance:   Weight/BMI: Body mass index is 35.9 kg/m².  Blood pressure 143/79, pulse 85, temperature 36.1 °C (97 °F), resp. rate 18, height 1.981 m (6' 6\"), weight (!) 140.9 kg (310 lb 10.1 oz), SpO2 94 %. Vitals:    09/12/17 0028 09/12/17 0052 09/12/17 0141 09/12/17 0200   BP: 143/79      Pulse: 84  89 85   Resp: 18  18 18   Temp: 36.1 °C (97 °F)      SpO2: 96%  95% 94%   Weight:  (!) 140.9 kg (310 lb 10.1 oz)     Height: 1.981 m (6' 6\") 1.981 m (6' 6\")      Oxygen Therapy:  Pulse Oximetry: 94 %    Constitutional:  well developed, well nourished, non-toxic, no acute distress  HENMT: Normocephalic, atraumatic, b/l ears normal, nose normal  Eyes:  EOMI, conjunctiva normal, no discharge  Neck: no tracheal deviation, supple  Cardiovascular: normal heart rate, normal rhythm, no murmurs, no rubs or gallops; no cyanosis, clubbing or edema  Lungs: Respiratory effort is normal, normal " breath sounds, breath sounds clear to auscultation b/l, no rales, rhonchi or wheezing  Abdomen: soft, non-tender, no guarding or rebound, active BS, no mass  Skin: warm, dry, no erythema, no rash  Neurologic: Alert and oriented, strength 0/5 on the R and 5/5 on the L,  CN II-XII normal  Psychiatric: Some anxiety or depression  Lymph node: No lymphadenopathy appreciated in the neck groin and axillary area.   Extremities: Bilateral lower extremities no pitting edema, bilateral pulses symmetric      Recent Labs      09/12/17   0143   WBC  11.8*   RBC  4.61*   HEMOGLOBIN  14.2   HEMATOCRIT  41.1*   MCV  89.2   MCH  30.8   MCHC  34.5   RDW  42.9   PLATELETCT  356   MPV  10.1     Recent Labs      09/12/17   0143   SODIUM  135   POTASSIUM  3.6   CHLORIDE  100   CO2  22   GLUCOSE  147*   BUN  22   CREATININE  0.94   CALCIUM  9.9     Recent Labs      09/12/17   0143   ALTSGPT  18   ASTSGOT  18   ALKPHOSPHAT  59   TBILIRUBIN  0.4   GLUCOSE  147*     Recent Labs      09/12/17   0143   INR  0.95             Lab Results   Component Value Date    TROPONINI <0.01 09/12/2017     Urinalysis:    Lab Results  Component Value Date/Time   SPECGRAVITY 1.024 04/15/2017 2307   GLUCOSEUR Negative 04/15/2017 2307   KETONES Negative 04/15/2017 2307   NITRITE Negative 04/15/2017 2307   WBCURINE 0-2 (A) 05/26/2015 2218   RBCURINE 0-2 (A) 05/26/2015 2218        Imaging  CT-CTA HEAD WITH & W/O-POST PROCESS   Final Result      1.  No evidence of intracranial vascular occlusion or aneurysm.      2.  Again seen extensive bilateral periventricular white matter low-attenuation.      CT-CTA NECK WITH & W/O-POST PROCESSING   Final Result      1.  Patent carotid and vertebral arteries. No evidence of occlusion or dissection.      2.  Dense soft tissue nodules within the anterior neck anterior to the laryngeal cartilage. The largest measures 3.3 x 2.5 cm in size. A normal thyroid gland is not seen and this may represent ectopic thyroid tissue.       Assessment/Plan     I anticipate this patient will require at least two midnights for appropriate medical management, necessitating inpatient admission.    * Right hemiparesis (CMS-HCC)- (present on admission)   Assessment & Plan    ? Etiology  CT (-)  TIA / Stroke workup  Pt will be admitted for stroke workup.  The pt will be monitored with neuro checks.   MRI PENDING. The pt will be seen by PT/OT.   Pt will be placed on aspirin and statin. A lipid panel and Hba1c will also be checked.   Per chart review patient has dysproteinemia many times and negative MRI recently. We will check another time if his stone activity and patient should not be admitted for stroke workup in the future.  Neuro being consulted and recom admission with MRI        Conversion disorder- (present on admission)   Assessment & Plan    Hx of this   Check MRI if Neg then can DC home        Bipolar disorder (CMS-HCC)- (present on admission)   Assessment & Plan    Mood stable cont home meds        Hypothyroid- (present on admission)   Assessment & Plan    Cont home meds  tsh checked        Hyperglycemia- (present on admission)   Assessment & Plan    Check A 1 C  Stress induced?        Depression- (present on admission)   Assessment & Plan    Mood stable  Cont home meds soloft        Asthma- (present on admission)   Assessment & Plan    stbale no sympotoms  Close monitor        Glaucoma- (present on admission)   Assessment & Plan    stbale  Cont home meds            VTE prophylaxis: SCD.        I have discussed patient admission status with  in the ER.    I spent 73 minutes evaluating the patient, reviewing the chart, vitals, labs and imaging, discussing the case with ED physician, medication reconciliation, placing orders and enacting the plan above.    This dictation was created using voice recognition software. The accuracy of the dictation is limited to the abilities of the software. Although every efforts have been used to  decrease the error, I expect there may be some errors of grammar and possibly content.

## 2017-09-12 NOTE — CONSULTS
NEUROLOGY STROKE / TIA EVALUATION TELEMEDICINE CONSULT NOTE    HPI:    37 year old male with history of conversion disorder, psychiatric issues,  on whom I have been asked to consult for evaluation and treatment of a possible stroke. Onset of symptoms was yesterday around 11 PM. The patient did not awaken with symptoms. Current symptoms include right sided weakness arm/leg and face weakness. Symptoms are currently improving. Stroke risk factors: none.  Prior stroke history: none. The patient  Is not on chronic anticoagulation. Associated symptoms: none.     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   • Hypothyroidism    • Indigestion     once in a while   • Mental disorder     learning disabilities; speech impairment; developmental delays   • Murmur     since birth   • Pneumonia     remote   • S/P thyroidectomy    • Seizure disorder (CMS-HCC)    • Unspecified disorder of thyroid          Past Surgical History  Past Surgical History:   Procedure Laterality Date   • EYE SURGERY     • OTHER      Hernia Repair when he was 10 yrs old   • THYROID LOBECTOMY         Medications    Current Facility-Administered Medications:   •  sertraline (ZOLOFT) tablet 100 mg, 100 mg, Oral, QAM, Tiana Kent M.D.  •  levothyroxine (SYNTHROID) tablet 125 mcg, 125 mcg, Oral, AM ES, Tiana Kent M.D., Stopped at 09/12/17 0700  •  latanoprost (XALATAN) 0.005 % ophthalmic solution 1 Drop, 1 Drop, Both Eyes, Nightly, Tiana Kent M.D.  •  divalproex ER (DEPAKOTE ER) tablet 1,500 mg, 1,500 mg, Oral, Q EVENING, Tiana Kent M.D.  •  acetaminophen (TYLENOL) tablet 650 mg, 650 mg, Oral, Q6HRS PRN, Tiana Kent M.D.  •  labetalol (NORMODYNE,TRANDATE) injection 10 mg, 10 mg, Intravenous, Q4HRS PRN **OR**  "hydrALAZINE (APRESOLINE) injection 10 mg, 10 mg, Intravenous, Q2HRS PRN, Tiana Kent M.D.  •  aspirin (ASA) tablet 325 mg, 325 mg, Oral, DAILY **OR** aspirin (ASA) chewable tab 324 mg, 324 mg, Oral, DAILY **OR** aspirin (ASA) suppository 300 mg, 300 mg, Rectal, DAILY, Tiana Kent M.D.  •  senna-docusate (PERICOLACE or SENOKOT S) 8.6-50 MG per tablet 2 Tab, 2 Tab, Oral, BID **AND** polyethylene glycol/lytes (MIRALAX) PACKET 1 Packet, 1 Packet, Oral, QDAY PRN **AND** magnesium hydroxide (MILK OF MAGNESIA) suspension 30 mL, 30 mL, Oral, QDAY PRN **AND** bisacodyl (DULCOLAX) suppository 10 mg, 10 mg, Rectal, QDAY PRN, Tiana Kent M.D.  •  0.9 % NaCl with KCl 20 mEq infusion, , Intravenous, Continuous, Tiana Kent M.D., Last Rate: 75 mL/hr at 09/12/17 0648      Allergies  Allergies   Allergen Reactions   • Abilify Unspecified     \"Feeling tired, like I don't even know whats going on around me\"   • Fish          Social History  Social History     Social History Narrative   • No narrative on file         Review of Systems  Negative except per HPI    Objective:  Encounter Vitals  Standard Vitals  Vitals  Blood Pressure: 143/79  Temperature: 36.1 °C (97 °F)  Pulse: 82  Respiration: 15  Pulse Oximetry: 92 %  Height: 198.1 cm (6' 5.99\")  Weight: (!) 140 kg (308 lb 10.3 oz)  Encounter Vitals  Temperature: 36.1 °C (97 °F)  Temp Source: Temporal  Blood Pressure: 143/79  BP Location: Right, Upper Arm  Patient BP Position: Sitting  Pulse: 82  Respiration: 15  Pulse Oximetry: 92 %  Weight: (!) 140 kg (308 lb 10.3 oz)  How Weight Obtained: Bed Scale  Height: 198.1 cm (6' 5.99\")  BMI (Calculated): 35.67  Pulmonary-Specific Vitals     Durable Medical Equipment-Specific Vitals         Male in bed, no distress.    NIHSS: 3  1a - LOC 0  1b - LOC 0  1c - LOC 0  2 - Best Gaze 0  3 - Visual Fields 0  4 - Facial Paresis 1  5 - Right Arm 1  6 - Left Arm  0  7 - Right Leg 1  8 - Left Leg 0  9 - Limb Ataxia 0  10 - Sensory - 0  11 - Best " Language/Aphasia 0  12 - Dysarthria 0  13 - Extinction/Inattention 0    Lab Results  Results for orders placed or performed during the hospital encounter of 09/12/17   CBC WITH DIFFERENTIAL   Result Value Ref Range    WBC 11.8 (H) 4.8 - 10.8 K/uL    RBC 4.61 (L) 4.70 - 6.10 M/uL    Hemoglobin 14.2 14.0 - 18.0 g/dL    Hematocrit 41.1 (L) 42.0 - 52.0 %    MCV 89.2 81.4 - 97.8 fL    MCH 30.8 27.0 - 33.0 pg    MCHC 34.5 33.7 - 35.3 g/dL    RDW 42.9 35.9 - 50.0 fL    Platelet Count 356 164 - 446 K/uL    MPV 10.1 9.0 - 12.9 fL    Neutrophils-Polys 54.00 44.00 - 72.00 %    Lymphocytes 34.00 22.00 - 41.00 %    Monocytes 8.20 0.00 - 13.40 %    Eosinophils 2.10 0.00 - 6.90 %    Basophils 1.00 0.00 - 1.80 %    Immature Granulocytes 0.70 0.00 - 0.90 %    Nucleated RBC 0.00 /100 WBC    Neutrophils (Absolute) 6.40 1.82 - 7.42 K/uL    Lymphs (Absolute) 4.02 1.00 - 4.80 K/uL    Monos (Absolute) 0.97 (H) 0.00 - 0.85 K/uL    Eos (Absolute) 0.25 0.00 - 0.51 K/uL    Baso (Absolute) 0.12 0.00 - 0.12 K/uL    Immature Granulocytes (abs) 0.08 0.00 - 0.11 K/uL    NRBC (Absolute) 0.00 K/uL   COMP METABOLIC PANEL   Result Value Ref Range    Sodium 135 135 - 145 mmol/L    Potassium 3.6 3.6 - 5.5 mmol/L    Chloride 100 96 - 112 mmol/L    Co2 22 20 - 33 mmol/L    Anion Gap 13.0 (H) 0.0 - 11.9    Glucose 147 (H) 65 - 99 mg/dL    Bun 22 8 - 22 mg/dL    Creatinine 0.94 0.50 - 1.40 mg/dL    Calcium 9.9 8.5 - 10.5 mg/dL    AST(SGOT) 18 12 - 45 U/L    ALT(SGPT) 18 2 - 50 U/L    Alkaline Phosphatase 59 30 - 99 U/L    Total Bilirubin 0.4 0.1 - 1.5 mg/dL    Albumin 4.5 3.2 - 4.9 g/dL    Total Protein 7.1 6.0 - 8.2 g/dL    Globulin 2.6 1.9 - 3.5 g/dL    A-G Ratio 1.7 g/dL   TROPONIN   Result Value Ref Range    Troponin I <0.01 0.00 - 0.04 ng/mL   PROTHROMBIN TIME   Result Value Ref Range    PT 13.0 12.0 - 14.6 sec    INR 0.95 0.87 - 1.13   ESTIMATED GFR   Result Value Ref Range    GFR If African American >60 >60 mL/min/1.73 m 2    GFR If Non African  American >60 >60 mL/min/1.73 m 2   EKG (ER)   Result Value Ref Range    Report       [facility]    Test Date:  2017  Pt Name:    HOLLY KIM                 Department: ER  MRN:        [id number]                      Room:  Gender:     ALDO                            Technician: [id number]  :        1980                   Requested By:ER TRIAGE PROTOCOL  Order #:    [id number]                    Reading MD:    Measurements  Intervals                                Axis  Rate:       78                           P:          55  TN:         168                          QRS:        53  QRSD:       112                          T:          16  QT:         352  QTc:        401    Interpretive Statements  SINUS RHYTHM  NONSPECIFIC INTRAVENTRICULAR CONDUCTION DELAY  PROBABLE LEFT VENTRICULAR HYPERTROPHY  Compared to ECG 2017 17:12:14  No significant changes         Imaging  CT Head: obtained.    Assessment/Plan:    Principal Problem:    Right hemiparesis (CMS-HCC) POA: Yes  Active Problems:    Conversion disorder POA: Yes    Hypothyroid POA: Yes    Bipolar disorder (CMS-HCC) POA: Yes    Glaucoma POA: Yes    Asthma POA: Yes    Depression POA: Yes    Hyperglycemia POA: Yes  Resolved Problems:    * No resolved hospital problems. *    38 yo with conversion and other psychiatric issues, now with 4.5 hours ago sudden onset of right sided paresis of arm/face/leg.  NIHSS 3. Discussed with ER staff, outside the iv tpa window.  Will proceed with stat CTA H/N to consider for endovascular retrieval of clot, if an appropriate candidate for it.    Could be Conversion vs Acute ischemic stroke involving subcortical left mca territory     Thrombolytic therapy discussed. Information provided to patient by ER Physician.                Risks, benefits and alternative treatments discussed. Question and concerns were answered.    Admit to floor/Stroke unit  Hold off on Anti-platelet until MRI brain wo  is obtained.  Start Lipitor 80 mg daily  Check Lipid panel (fasting) & Hemoglobin A1c  MRI Brain wo and MRA H/N in 24 hours.  Get TTE with bubble study.   Permissive Hypertension for first 24 hours post onset of stroke symptoms. Do not treat BP unless > 220/110 mm hg. Use prn labetalol/Hydralazine/Cardene gtt as necessary  DVT Prophylaxis with Lovenox or heparin subcutaneous and SCD/TEDS  If cryptogenic etiology, despite above work-up, plan to get implantable loop monitor  Zofran for Nausea/Vomiting  Acetaminophen Po or per-rectal for fever  Maintain Euvolemia, Normothermia, Normonatremia  Target Blood Glucose 120-180. Finger stick glucose q 6 hours if diabetic. Otherwise AC/HS check  Aspiration precautions  Check daily CBC/BMP/Mag/Phos/Calcium  Keep Mag > 2 and K > 4 to avoid cardiac arrhythmias  PT/OT/ST consults. Keep NPO until bedside swallow evaluation  Neurochecks/vitals/I&Os/Cardiac Telemetry per unit routine.   Alert MD if change in Neuro status by NIHSS> 4 or GCS > 2 points    THANKS FOR THE CONSULT.  PLEASE CALL WITH QUESTIONS.    FREDY HOBBS MD, MPH  GENERAL & VASCULAR NEUROLOGIST  NEUROINTENSIVIST  TELEMEDICINE NEUROLOGIST

## 2017-09-12 NOTE — ED NOTES
Norm Prabhakar  37 y.o.  Chief Complaint   Patient presents with   • Possible Stroke   • GLF     x 2 today   • Syncope       Ambulatory to triage with above complaints. Staggering gait. Patient c/o R-sided weakness with accompanying numbness that started today, with first GLF at 1000. NIHSS 5 - Charge OMID Gasca notified.      Triage process explained to patient, apologized for wait time, and returned to lobby.

## 2017-09-12 NOTE — ED NOTES
Pt. Brought back to [facility] via wheelchair as pt. States he is unable to move his right side of his body, but pt. Is able to transfer himself from wheelchair to gurney bearing weight on right side. Pt. Is somewhat uncooperative with neuro assessment stating he can't move his right side but also cannot show movement of his left side when assessing smile and grimace on face.

## 2017-09-12 NOTE — EEG PROGRESS NOTE
VIDEO ELECTROENCEPHALOGRAM REPORT      Referring MD: Dr. Kent.     DOS:  9/12/2017 (total recording of 26 minutes).     INDICATION:  Norm Prabhakar 37 y.o. male presenting with right sided weakness. Rule out seizure.     CURRENT ANTIEPILEPTIC REGIMEN: Valproic acid 1500 mg po qhs.     TECHNIQUE: 30 channel video electroencephalogram (EEG) was performed in accordance with the international 10-20 system. The study was reviewed in bipolar and referential montages. The recording examined the patient during wakeful and drowsy/sleep state(s).     DESCRIPTION OF THE RECORD:  During the wakefulness, the background showed a symmetrical 9 Hz alpha activity posteriorly with amplitude of 70 mV.  There was reactivity to eye closure/opening.  A normal anterior-posterior gradient was noted with faster beta frequencies seen anteriorly.  During drowsiness, theta/delta frequencies were seen.    During the sleep state, background shows diffuse high-amplitude 4-5 Hz delta activity.  Symmetrical high-amplitude sleep spindles and vertex sharps were seen in the leads over the central regions.     ACTIVATION PROCEDURES:   Hyperventilation was performed by the patient for a total of 3 minutes. The technician performing the test noted good effort. This technique failed to produce electroencephalographic changes.    Intermittent Photic stimulation was performed in a stepwise fashion from 1 to 30 Hz, but failed to elicit any significant background changes.       ICTAL AND/OR INTERICTAL FINDINGS:   Frequent right posterior quadrant polyspikes and intermittent slowing in same region. No clinical events or seizures were reported or recorded during the study.                   EKG: sampling of the EKG recording demonstrated sinus rhythm.       INTERPRETATION:  This is an abnormal video EEG recording in the awake, drowsy, and sleep state(s).  Frequent epileptiform discharges arising from the right posterior quadrant, there is intermittent  slowing in the same region. The findings increase risk for seizures and suggest underlying area of cortical irritability and structural abnormality. Clinical and radiological correlation is recommended.      Obi Becerril MD  Medical Director, Epilepsy and Neurodiagnostics.   Clinical  of Neurology [facility].   Diplomate in Neurology, Epilepsy, and Electrodiagnostic Medicine.   Office: [phone]  Fax: [phone]

## 2017-09-12 NOTE — PROGRESS NOTES
Mariluz Ball Fall Risk Assessment:     Last Known Fall: No falls  Mobility: Use of assistive device/requires assist of two people  Medications: Cardiovascular or central nervous system meds  Mental Status/LOC/Awareness: Awake, alert, and oriented to date, place, and person  Toileting Needs: No needs  Volume/Electrolyte Status: Use of IV fluids/tube feeds  Communication/Sensory: Visual (Glasses)/hearing deficit  Behavior: Appropriate behavior  Mariluz Ball Fall Risk Total: 8  Fall Risk Level: LOW RISK    Universal Fall Precautions:  call light/belongings in reach, bed in low position and locked, wheelchairs and assistive devices out of sight, siderails up x 2, use non-slip footwear, adequate lighting, educate on level of risk, clutter free and spill free environment, educate to call for assistance    Fall Risk Level Interventions:     TRIAL (TELE 8, NEURO, MED PRISCILA 5) High Fall Risk Interventions  Place yellow fall risk ID band on patient: completed  Provide patient/family education based on risk assessment: completed  Educate patient/family to call staff for assistance when getting out of bed: completed  Place fall precaution signage outside patient door: completed  Place patient in room close to nursing station: completed  Utilize bed/chair fall alarm: completed  Notify charge of high risk for huddle: completed    Patient Specific Interventions:     Medication: review medications with patient and family  Mental Status/LOC/Awareness: reorient patient  Toileting: provide frquent toileting  Volume/Electrolyte Status: ensure patient remains hydrated  Communication/Sensory: update plan of care on whiteboard  Behavioral: instruct/reinforce fall program rationale  Mobility: utilize bed/chair fall alarm and ensure bed is locked and in lowest position

## 2017-09-12 NOTE — ED NOTES
Pt. Updated on the POC to be admitted to the hospital. Pt. Has no further nuerological deficits or worsening symptoms. Call light within reach. Will continue to monitor.

## 2017-09-12 NOTE — PROCEDURES
VIDEO ELECTROENCEPHALOGRAM REPORT        Referring MD: Dr. Kent.      DOS:  9/12/2017 (total recording of 26 minutes).      INDICATION:  Norm Prabhakar 37 y.o. male presenting with right sided weakness. Rule out seizure.      CURRENT ANTIEPILEPTIC REGIMEN: Valproic acid 1500 mg po qhs.      TECHNIQUE: 30 channel video electroencephalogram (EEG) was performed in accordance with the international 10-20 system. The study was reviewed in bipolar and referential montages. The recording examined the patient during wakeful and drowsy/sleep state(s).      DESCRIPTION OF THE RECORD:  During the wakefulness, the background showed a symmetrical 9 Hz alpha activity posteriorly with amplitude of 70 mV.  There was reactivity to eye closure/opening.  A normal anterior-posterior gradient was noted with faster beta frequencies seen anteriorly.  During drowsiness, theta/delta frequencies were seen.     During the sleep state, background shows diffuse high-amplitude 4-5 Hz delta activity.  Symmetrical high-amplitude sleep spindles and vertex sharps were seen in the leads over the central regions.      ACTIVATION PROCEDURES:   Hyperventilation was performed by the patient for a total of 3 minutes. The technician performing the test noted good effort. This technique failed to produce electroencephalographic changes.     Intermittent Photic stimulation was performed in a stepwise fashion from 1 to 30 Hz, but failed to elicit any significant background changes.         ICTAL AND/OR INTERICTAL FINDINGS:   Frequent right posterior quadrant polyspikes and intermittent slowing in same region. No clinical events or seizures were reported or recorded during the study.                        EKG: sampling of the EKG recording demonstrated sinus rhythm.         INTERPRETATION:  This is an abnormal video EEG recording in the awake, drowsy, and sleep state(s).  Frequent epileptiform discharges arising from the right posterior quadrant, there is  intermittent slowing in the same region. The findings increase risk for seizures and suggest underlying area of cortical irritability and structural abnormality. Clinical and radiological correlation is recommended.        Obi Becerril MD  Medical Director, Epilepsy and Neurodiagnostics.   Clinical  of Neurology [facility].   Diplomate in Neurology, Epilepsy, and Electrodiagnostic Medicine.   Office: [phone]  Fax: [phone]       DORIS AVITIA    DD:  09/12/2017 12:55:26  DT:  09/12/2017 13:14:19    D#:  [id number]  Job#:  [id number]

## 2017-09-12 NOTE — ASSESSMENT & PLAN NOTE
? Etiology  CT (-)  TIA / Stroke workup  Pt will be admitted for stroke workup.  The pt will be monitored with neuro checks.   MRI PENDING. The pt will be seen by PT/OT.   Pt will be placed on aspirin and statin. A lipid panel and Hba1c will also be checked.   Per chart review patient has dysproteinemia many times and negative MRI recently. We will check another time if his stone activity and patient should not be admitted for stroke workup in the future.  Neuro being consulted and recom admission with MRI

## 2017-09-12 NOTE — ED NOTES
Pt transferred to S197-02, Matthew ANNE aware of pt's pending arrival, all belongings accounted for.

## 2017-09-12 NOTE — CARE PLAN
Problem: Mobility  Goal: Risk for activity intolerance will decrease  Outcome: PROGRESSING AS EXPECTED      Problem: Safety  Goal: Will remain free from injury  Outcome: PROGRESSING AS EXPECTED  Bedstrip alarm in place. Pt able to use call light appropriately. Hourly rounding in place.

## 2017-09-12 NOTE — THERAPY
"Physical Therapy Evaluation completed.   Bed Mobility:  Supine to Sit: Modified Independent  Transfers: Sit to Stand: Moderate Assist  Gait: Level Of Assist: Unable to Participate; see below  Plan of Care: Will benefit from Physical Therapy 2 times per week while here; see below  Discharge Recommendations: Equipment: Will Continue to Assess for Equipment Needs. See below    Pt presents to PT for risk reduction for LOB and falling. With PT he demonstrates RLE/UE deficits and impaired motor control, though noted inconsistencies b/w disciplines (ie - pt was unable to take steps with PT though ambulated with FWW with OT earlier in day). Pt will benefit from continued skilled PT based on current objective findings, though given hx and inconsistencies, anticiapte psych/behavioral component to deficits and pt should be mobilizing as able. Progression will likely be limited/affected by behavior as appears to be limited physiological reasons for current displayed deficits. Noted pt reports hx of R paresis and reports has taken days to weeks to resolve. Will continue to visit.     See \"Rehab Therapy-Acute\" Patient Summary Report for complete documentation.     "

## 2017-09-12 NOTE — THERAPY
"Speech Language Therapy Clinical Swallow Evaluation completed.  Functional Status: Patient strict NPO/no nutrition pending evaluation and eager for PO trials. Patient had slightly slowed movements during oral motor examination, but function was WNL. Patient consumed PO trials of single ice chips, NTL via tsp and cup sip, purees, pudding, soft solids, mixed consistencies, crackers, and thin liquids via cup sip and straw. Patient consumed all PO trials with no overt s/sx of aspiration. Patient had timely initiation of swallow trigger and complete laryngeal elevation upon palpation. Vocal quality remained clear during entire evaluation. At this time, patient appears at the level to start regular diet with thin liquids. Float pills in applesauce, per patient request. Patient does not appear to require any further acute SLP services. Please re-consult SLP if necessary. Thank you.     Recommendations - Diet: Regular, Thin Liquid                          Strategies: Head of Bed at 90 Degrees                          Medication Administration: Float Whole with Puree (Per patient preference )  Plan of Care: Patient with no further skilled SLP needs in the acute care setting at this time  Post-Acute Therapy: Currently anticipate no further skilled therapy needs once patient is discharged from the inpatient setting.    See \"Rehab Therapy-Acute\" Patient Summary Report for complete documentation.   "

## 2017-09-12 NOTE — ED NOTES
Dr. Villar consulted with neurologist. POC is for pt. To be admitted to the hospital. Pt. Updated on POC. Call light within reach. No further neurological deficits noted. Will continue to monitor.

## 2017-09-12 NOTE — THERAPY
"Occupational Therapy Evaluation completed.   Functional Status:  Pt admitted to hospital to r/o CVA, currently undergoing testing, CAT and u/s negative for acute CVA, awaiting MRI and EEG this am. Pt performed bed mobility with sba, LB dressing with supervision/sba, toileting and toilet t/f's with supervision. Pt BUE appears generalized weakness,  strength weak but decreased equally bilaterally, RUE able to achieve full shoulder ROM with increased time and encouragement, thumb to finger opposition intact just slightly slow with movements overall. Again functionally, pt noted to have no difficulties with ADLs except dragging RLE during functional mobility; however once verbal cues to lift RLE, which pt was able to do and maintain. Pt is demonstrating inconsistencies with formal testing vs observations. Pt does not present the need for acute skilled services, will see 1x more if needed for d/c planning purposes.   Plan of Care: not following, but will see 1x more if needed for d/c planning purposes  Discharge Recommendations:  Equipment: Front-Wheel Walker. Post-acute therapy Discharge to home with outpatient or home health for additional skilled therapy services.    See \"Rehab Therapy-Acute\" Patient Summary Report for complete documentation.    "

## 2017-09-12 NOTE — ED NOTES
Pt. Updated on the POC to be transferred to admission room. No worsening neurological deficits noted. Call light within reach. Will continue to monitor.

## 2017-09-12 NOTE — ED PROVIDER NOTES
"ED Provider Note    CHIEF COMPLAINT  Chief Complaint   Patient presents with   • Possible Stroke   • GLF     x 2 today   • Syncope       HPI  Norm Prabhakar is a 37 y.o. male here for evaluation of right sided paralysis. The pt states his paralysis started at 11pm tonight, suddenly.  He has no fever, no vomiting, and no trauma.  He states he has a history of the same, but has not seen a neurologist.  He states it is only right sided.  No left side involvement.     PAST MEDICAL HISTORY   has a past medical history of Anxiety; ASTHMA; Bipolar 1 disorder (CMS-HCC); Depression; Fall; Glaucoma; Glaucoma (1982); Hypothyroidism; Indigestion; Mental disorder; Murmur; Pneumonia; Psychiatric problem (2002); S/P thyroidectomy; Seizure (CMS-HCC) (2010); Seizure disorder (CMS-HCC); and Unspecified disorder of thyroid.    SOCIAL HISTORY  Social History     Social History Main Topics   • Smoking status: Former Smoker     Packs/day: 0.25     Years: 4.00     Types: Cigarettes     Quit date: 1/1/2014   • Smokeless tobacco: Never Used   • Alcohol use No   • Drug use:      Types: Inhaled      Comment: marijuana   • Sexual activity: Not on file       SURGICAL HISTORY   has a past surgical history that includes eye surgery; thyroid lobectomy; and other.    CURRENT MEDICATIONS  Home Medications     Reviewed by Desirae Lewis R.N. (Registered Nurse) on 09/12/17 at 0145  Med List Status: Complete   Medication Last Dose Status   divalproex ER (DEPAKOTE ER) 500 MG TABLET SR 24 HR 9/11/2017 Active   latanoprost (XALATAN) 0.005 % Solution 9/11/2017 Active   levothyroxine (SYNTHROID) 125 MCG Tab 9/11/2017 Active   sertraline (ZOLOFT) 100 MG Tab 9/11/2017 Active                ALLERGIES  Allergies   Allergen Reactions   • Abilify Unspecified     \"Feeling tired, like I don't even know whats going on around me\"   • Fish        REVIEW OF SYSTEMS  See HPI for further details. Review of systems as above, otherwise all other systems are " "negative.     PHYSICAL EXAM  VITAL SIGNS: /79   Pulse 85   Temp 36.1 °C (97 °F)   Resp 18   Ht 1.981 m (6' 6\")   Wt (!) 140.9 kg (310 lb 10.1 oz)   SpO2 94%   BMI 35.90 kg/m²     Constitutional:  Well developed, well  Nourished.   HEENT: AT.  Glaucoma noted. Posterior pharynx clear, and without exudate. No uvula edema.  Neck: Full range of motion; non tender.   Cardiovascular: Regular heart rate and rhythm.  No murmurs, rubs, nor gallop appreciated.   Back:  Non tender midline.  No obvious step off or deformity.  Thorax & Lungs: Lungs are clear to auscultation with good air movement bilaterally.  No wheeze, rhonchi, nor rales.   Abdomen: Soft, with no tenderness, rebound nor guarding.  No mass, pulsatile mass, nor hepatosplenomegaly appreciated.  Skin: No purpura nor petechia noted.  No rash.  Extremities/Musculoskeletal: No sign of trauma.    Musculoskeletal: Range of motion is intact in all major joints.  Neurologic: Alert & oriented x 3.  Right side facial droop.  Flaccid paralysis on the right upper and right lower ext.  slurred speech, appropriate, cooperative.  Psychiatric: Normal affect appropriate for the clinical situation.    Results for orders placed or performed during the hospital encounter of 09/12/17   CBC WITH DIFFERENTIAL   Result Value Ref Range    WBC 11.8 (H) 4.8 - 10.8 K/uL    RBC 4.61 (L) 4.70 - 6.10 M/uL    Hemoglobin 14.2 14.0 - 18.0 g/dL    Hematocrit 41.1 (L) 42.0 - 52.0 %    MCV 89.2 81.4 - 97.8 fL    MCH 30.8 27.0 - 33.0 pg    MCHC 34.5 33.7 - 35.3 g/dL    RDW 42.9 35.9 - 50.0 fL    Platelet Count 356 164 - 446 K/uL    MPV 10.1 9.0 - 12.9 fL    Neutrophils-Polys 54.00 44.00 - 72.00 %    Lymphocytes 34.00 22.00 - 41.00 %    Monocytes 8.20 0.00 - 13.40 %    Eosinophils 2.10 0.00 - 6.90 %    Basophils 1.00 0.00 - 1.80 %    Immature Granulocytes 0.70 0.00 - 0.90 %    Nucleated RBC 0.00 /100 WBC    Neutrophils (Absolute) 6.40 1.82 - 7.42 K/uL    Lymphs (Absolute) 4.02 1.00 - 4.80 " K/uL    Monos (Absolute) 0.97 (H) 0.00 - 0.85 K/uL    Eos (Absolute) 0.25 0.00 - 0.51 K/uL    Baso (Absolute) 0.12 0.00 - 0.12 K/uL    Immature Granulocytes (abs) 0.08 0.00 - 0.11 K/uL    NRBC (Absolute) 0.00 K/uL   COMP METABOLIC PANEL   Result Value Ref Range    Sodium 135 135 - 145 mmol/L    Potassium 3.6 3.6 - 5.5 mmol/L    Chloride 100 96 - 112 mmol/L    Co2 22 20 - 33 mmol/L    Anion Gap 13.0 (H) 0.0 - 11.9    Glucose 147 (H) 65 - 99 mg/dL    Bun 22 8 - 22 mg/dL    Creatinine 0.94 0.50 - 1.40 mg/dL    Calcium 9.9 8.5 - 10.5 mg/dL    AST(SGOT) 18 12 - 45 U/L    ALT(SGPT) 18 2 - 50 U/L    Alkaline Phosphatase 59 30 - 99 U/L    Total Bilirubin 0.4 0.1 - 1.5 mg/dL    Albumin 4.5 3.2 - 4.9 g/dL    Total Protein 7.1 6.0 - 8.2 g/dL    Globulin 2.6 1.9 - 3.5 g/dL    A-G Ratio 1.7 g/dL   TROPONIN   Result Value Ref Range    Troponin I <0.01 0.00 - 0.04 ng/mL   ESTIMATED GFR   Result Value Ref Range    GFR If African American >60 >60 mL/min/1.73 m 2    GFR If Non African American >60 >60 mL/min/1.73 m 2   EKG (ER)   Result Value Ref Range    Report       [facility] Emergency Dept.    Test Date:  2017  Pt Name:    HOLLY KIM                 Department: ER  MRN:        [id number]                      Room:  Gender:     M                            Technician: [id number]  :        1980                   Requested By:ER TRIAGE PROTOCOL  Order #:    [id number]                    Philippe MD:    Measurements  Intervals                                Axis  Rate:       78                           P:          55  MO:         168                          QRS:        53  QRSD:       112                          T:          16  QT:         352  QTc:        401    Interpretive Statements  SINUS RHYTHM  NONSPECIFIC INTRAVENTRICULAR CONDUCTION DELAY  PROBABLE LEFT VENTRICULAR HYPERTROPHY  Compared to ECG 2017 17:12:14  No significant changes       CT-CTA HEAD WITH & W/O-POST PROCESS   Final Result       1.  No evidence of intracranial vascular occlusion or aneurysm.      2.  Again seen extensive bilateral periventricular white matter low-attenuation.      CT-CTA NECK WITH & W/O-POST PROCESSING   Final Result      1.  Patent carotid and vertebral arteries. No evidence of occlusion or dissection.      2.  Dense soft tissue nodules within the anterior neck anterior to the laryngeal cartilage. The largest measures 3.3 x 2.5 cm in size. A normal thyroid gland is not seen and this may represent ectopic thyroid tissue.            PROCEDURES     MEDICAL RECORD  I have reviewed patient's medical record and pertinent results are listed above.    COURSE & MEDICAL DECISION MAKING  I have reviewed any medical record information, laboratory studies and radiographic results as noted above.    3:23 AM  I spoke to JOCELYN law, who states pt is too far out of the window for Alteplase.      3:45 AM  Dr. Kent will admit the pt.   Pt will be kept NPO    Differential diagnoses include but not limited to: cva, conversion disorder, hypoglycemia.     FINAL IMPRESSION  1. Paralysis of right upper extremity (CMS-HCC)    2. Paralysis of right lower extremity (CMS-HCC)      Electronically signed by: Candido Villar, 9/12/2017 3:23 AM

## 2017-09-13 ENCOUNTER — HOME HEALTH ADMISSION (OUTPATIENT)
Dept: HOME HEALTH SERVICES | Facility: HOME HEALTHCARE | Age: 37
End: 2017-09-13
Payer: MEDICAID

## 2017-09-13 VITALS
OXYGEN SATURATION: 91 % | WEIGHT: 308.64 LBS | SYSTOLIC BLOOD PRESSURE: 120 MMHG | HEART RATE: 69 BPM | DIASTOLIC BLOOD PRESSURE: 65 MMHG | TEMPERATURE: 97.5 F | BODY MASS INDEX: 35.71 KG/M2 | RESPIRATION RATE: 18 BRPM | HEIGHT: 78 IN

## 2017-09-13 LAB
ANION GAP SERPL CALC-SCNC: 9 MMOL/L (ref 0–11.9)
BASOPHILS # BLD AUTO: 1.1 % (ref 0–1.8)
BASOPHILS # BLD: 0.11 K/UL (ref 0–0.12)
BUN SERPL-MCNC: 20 MG/DL (ref 8–22)
CALCIUM SERPL-MCNC: 9 MG/DL (ref 8.5–10.5)
CHLORIDE SERPL-SCNC: 104 MMOL/L (ref 96–112)
CHOLEST SERPL-MCNC: 237 MG/DL (ref 100–199)
CO2 SERPL-SCNC: 24 MMOL/L (ref 20–33)
CREAT SERPL-MCNC: 0.73 MG/DL (ref 0.5–1.4)
EOSINOPHIL # BLD AUTO: 0.32 K/UL (ref 0–0.51)
EOSINOPHIL NFR BLD: 3.2 % (ref 0–6.9)
ERYTHROCYTE [DISTWIDTH] IN BLOOD BY AUTOMATED COUNT: 45.3 FL (ref 35.9–50)
GFR SERPL CREATININE-BSD FRML MDRD: >60 ML/MIN/1.73 M 2
GLUCOSE SERPL-MCNC: 137 MG/DL (ref 65–99)
HCT VFR BLD AUTO: 41.1 % (ref 42–52)
HDLC SERPL-MCNC: 37 MG/DL
HGB BLD-MCNC: 13.8 G/DL (ref 14–18)
IMM GRANULOCYTES # BLD AUTO: 0.1 K/UL (ref 0–0.11)
IMM GRANULOCYTES NFR BLD AUTO: 1 % (ref 0–0.9)
LDLC SERPL CALC-MCNC: 123 MG/DL
LYMPHOCYTES # BLD AUTO: 3.4 K/UL (ref 1–4.8)
LYMPHOCYTES NFR BLD: 34.1 % (ref 22–41)
MCH RBC QN AUTO: 30.8 PG (ref 27–33)
MCHC RBC AUTO-ENTMCNC: 33.6 G/DL (ref 33.7–35.3)
MCV RBC AUTO: 91.7 FL (ref 81.4–97.8)
MONOCYTES # BLD AUTO: 1.05 K/UL (ref 0–0.85)
MONOCYTES NFR BLD AUTO: 10.5 % (ref 0–13.4)
NEUTROPHILS # BLD AUTO: 5 K/UL (ref 1.82–7.42)
NEUTROPHILS NFR BLD: 50.1 % (ref 44–72)
NRBC # BLD AUTO: 0 K/UL
NRBC BLD AUTO-RTO: 0 /100 WBC
PLATELET # BLD AUTO: 279 K/UL (ref 164–446)
PMV BLD AUTO: 9.7 FL (ref 9–12.9)
POTASSIUM SERPL-SCNC: 3.9 MMOL/L (ref 3.6–5.5)
RBC # BLD AUTO: 4.48 M/UL (ref 4.7–6.1)
SODIUM SERPL-SCNC: 137 MMOL/L (ref 135–145)
TRIGL SERPL-MCNC: 383 MG/DL (ref 0–149)
WBC # BLD AUTO: 10 K/UL (ref 4.8–10.8)

## 2017-09-13 PROCEDURE — 80048 BASIC METABOLIC PNL TOTAL CA: CPT

## 2017-09-13 PROCEDURE — 36415 COLL VENOUS BLD VENIPUNCTURE: CPT

## 2017-09-13 PROCEDURE — A9270 NON-COVERED ITEM OR SERVICE: HCPCS | Performed by: INTERNAL MEDICINE

## 2017-09-13 PROCEDURE — 80061 LIPID PANEL: CPT

## 2017-09-13 PROCEDURE — 99239 HOSP IP/OBS DSCHRG MGMT >30: CPT | Performed by: INTERNAL MEDICINE

## 2017-09-13 PROCEDURE — 85025 COMPLETE CBC W/AUTO DIFF WBC: CPT

## 2017-09-13 PROCEDURE — 700102 HCHG RX REV CODE 250 W/ 637 OVERRIDE(OP): Performed by: INTERNAL MEDICINE

## 2017-09-13 RX ADMIN — ASPIRIN 325 MG: 325 TABLET, COATED ORAL at 08:49

## 2017-09-13 RX ADMIN — LEVOTHYROXINE SODIUM 125 MCG: 25 TABLET ORAL at 05:43

## 2017-09-13 RX ADMIN — SERTRALINE 100 MG: 100 TABLET, FILM COATED ORAL at 08:49

## 2017-09-13 RX ADMIN — STANDARDIZED SENNA CONCENTRATE AND DOCUSATE SODIUM 2 TABLET: 8.6; 5 TABLET, FILM COATED ORAL at 08:49

## 2017-09-13 ASSESSMENT — LIFESTYLE VARIABLES: EVER_SMOKED: NEVER

## 2017-09-13 ASSESSMENT — PAIN SCALES - GENERAL: PAINLEVEL_OUTOF10: 0

## 2017-09-13 NOTE — PROGRESS NOTES
Assumed patient care at change of shift. Patient in no notable distress, he denies pain. Patient able to move all 4 extremities when distracted. Tele shows NSR. VSS. No swallowing deficits noted. Patient oriented to hospital room and appropriate use of call light. Bed is locked and in lowest position. Call light is on and within reach. Bed alarm is on and functional. Will continue to monitor patient needs and safety.

## 2017-09-13 NOTE — CONSULTS
DATE OF SERVICE:  09/12/2017    REFERRING PHYSICIAN:  Jimy Kiser MD    REFERRAL REASON:  Right-sided weakness.    HISTORY OF PRESENT ILLNESS:  The patient is a 37-year-old man with a past   medical history of seizures and recurrent episodes of unilateral weakness, who   presented to the emergency room with symptoms beginning around 11:00 p.m. of   right-sided weakness.  He underwent imaging in the emergency room which was   negative and teleneurology was consulted and recommended no TPA.  The patient   denies any current headache.  He reports that these symptoms come and go since   2010 when he was hospitalized and told he had a viral infection of his brain.    He has a history of seizures since that time.  He does not think he had a   seizure prior to the onset of these symptoms.  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    He denies any bowel or bladder difficulties at this time.    PAST MEDICAL HISTORY:  Significant for,  1.  Seizures.  2.  PTSD.  3.  Bipolar disorder.  4.  Glaucoma.  5.  Hypothyroidism.    MEDICATIONS:  His medications at home are,  1.  Depakote ER 1500 mg at bedtime.  2.  Zoloft 100 mg daily.  3.  Xalatan eyedrops.  4.  Synthroid 125 mcg daily.    REVIEW OF SYSTEMS:  Otherwise negative except for as mentioned above.    FAMILY HISTORY:  Notable for mother with a stroke.    SOCIAL HISTORY:  He quit smoking several years ago.  He denies any significant   alcohol or drug use.  He lives in a group home.    ALLERGIES:  HE HAS ALLERGIES TO ABILIFY AND FISH.    PHYSICAL EXAMINATION:  VITAL SIGNS:  Blood pressure is 106/56 with a heart rate of 55, temperature   97.4, respirations 18.  GENERAL:  The patient is sitting in bed, looking at his computer with no   apparent distress.  MENTAL STATUS:  He is awake, alert, oriented x3, although he is somewhat slow   in his thought processes.  CRANIAL NERVES:  His right pupil appears reactive.  The  left is poorly   reactive.  He has some significant proptosis on the left.  Extraocular   movements appear intact with no nystagmus.  There may be some subtle right   facial droop.  He is mildly dysarthric.  MOTOR:  Motor exam shows 3/5 strength in the right upper extremity, 4/5   strength in the right lower extremity, 5/5 in the left side.  REFLEXES:  Reflexes are absent throughout with toes being mute bilaterally.  SENSATION:  Appears intact to light touch and vibration throughout.  COORDINATION:  Shows relatively normal finger-to-nose on the left, a little   bit of difficulty on the right due to weakness.  GAIT:  Deferred.    LABORATORY DATA:  The patient has a white blood cell count of 11.8, hemoglobin   14.2, platelets 356,000.  Sodium 135, potassium 3.6, hemoglobin A1c is 7.1.    CT angiogram of the head and neck showed no evidence of intravascular   occlusion or aneurysm.  I reviewed an MRI done in August which showed   extensive confluent white matter hyperintensity consistent with a severe   diffuse leukoencephalopathy.    IMPRESSION:  The patient is a 37-year-old man with right-sided weakness in the   setting of a severe longstanding leukoencephalopathy.  I am uncertain what   the etiology of his leukoencephalopathy is, and he is unable to tell me if he   has had significant evaluation in the past.  It does not sound like he has.    Given the extensive nature, I would suspect that this is some type of genetic   disorder rather than something acquired.  If he truly had encephalitis in 2010   it could have potentially caused this finding on MRI.    RECOMMENDATIONS:  1.  I agree I do not think another MRI would be useful at this point as he has   had recurrent episodes with no acute findings, found on his MRI just the   longstanding leukoencephalopathy.  2.  We will check an EEG to evaluate for epileptiform activity or focal   slowing.  3.  I will continue PT, OT and speech therapy.  4.  I will continue to  follow the patient.  If you have any further questions   or concerns, please feel free to contact me.       ____________________________________     MD DEWEY Smith / ADORE    DD:  09/12/2017 18:18:55  DT:  09/12/2017 18:57:44    D#:  [id number]  Job#:  [id number]

## 2017-09-13 NOTE — DISCHARGE PLANNING
Per [facility], they contacted MYRANDA Linares MD office who stated that patient was last seen in 2016. Patient must re-establish. Per Yanyd(GABY), patient is has a doctor's appointment with Long Linares MD on 9/15/17 at 0915. [facility] notified via phone and has accepted patient. Contacted [facility] as patient has Medicaid. Arranged patient's transport to [facility] at [phone] with [employer] via [facility].  Marlee) notified of transport time.

## 2017-09-13 NOTE — PROGRESS NOTES
Accepted patient. Concerning for underlying seizure activity and ?dereje's paralysis. Ordered EEG with video. Neurology is following. Cancelled MRI brain as was just done on 8/11.

## 2017-09-13 NOTE — PROGRESS NOTES
Monitor summary: SB-SR 44-71, SD 0.14, QRS 0.12, QT 0.38, with rare PACs and HR down to 40 non sustaining, per strip from monitor room.

## 2017-09-13 NOTE — FACE TO FACE
Face to Face Note  -  Durable Medical Equipment    Zenon Chase M.D. - NPI: [id number]  I certify that this patient is under my care and that they have had a durable medical equipment(DME)face to face encounter by myself that meets the physician DME face-to-face encounter requirements with this patient on:    Date of encounter:   Patient:                    MRN:                       YOB: 2015  Norm Prabhakar  [id number]  1980     The encounter with the patient was in whole, or in part, for the following medical condition, which is the primary reason for durable medical equipment:  Other - Right sided hemiparesis    I certify that, based on my findings, the following durable medical equipment is medically necessary:  Walkers.    HOME O2 Saturation Measurements:(Values must be present for Home Oxygen orders)         My Clinical findings support the need for the above equipment due to:  Abnormal Gait    Supporting Symptoms: Right sided arm and leg weakness     ------------------------------------------------------------------------------------------------------------------    Face to Face Supporting Documentation - Home Health    The encounter with this patient was in whole or in part the primary reason for home health admission.    Date of encounter:   Patient:                    MRN:                       YOB: 2015  Norm Prabhakar  [id number]  1980     Home health to see patient for:  Physical Therapy evaluation and treatment      Homebound evidenced status by:  Need the aid of supportive devices such as crutches, canes, wheelchairs or walkers. Leaving home must require a considerable and taxing effort. There must exist a normal inability to leave the home.    Community Physician to provide follow up care: Long Linares M.D.     Optional Interventions    Wound information & treatment:    Home Infusion Therapy orders:    Line/Drain/Airway:    I certify  the face to face encounter for this home care referral meets the CMS requirements and the encounter/clinical assessment with the patient was, in whole, or in part, for the medical condition(s) listed above, which is the primary reason for home health care. Based on my clinical findings: the service(s) are medically necessary, support the need for home health care, and the homebound criteria are met.  I certify that this patient has had a face to face encounter by myself.  Zenon Chase M.D. - NPI: [id number]    *Debility, frailty and advanced age in the absence of an acute deterioration or exacerbation of a condition do not qualify a patient for home health.
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
## 2017-09-13 NOTE — DISCHARGE INSTRUCTIONS
Discharge Instructions    Discharged to group home by medical transportation with escort. Discharged via wheelchair, hospital escort: Yes.  Special equipment needed: Not Applicable    Be sure to schedule a follow-up appointment with your primary care doctor or any specialists as instructed.     Discharge Plan:   Diet Plan: Discussed  Activity Level: Discussed  Smoking Cessation Offered: Patient Counseled  Confirmed Follow up Appointment: Patient to Call and Schedule Appointment  Confirmed Symptoms Management: Discussed  Medication Reconciliation Updated: Yes  Influenza Vaccine Indication: Patient Refuses    I understand that a diet low in cholesterol, fat, and sodium is recommended for good health. Unless I have been given specific instructions below for another diet, I accept this instruction as my diet prescription.   Other diet: general    Special Instructions: None    · Is patient discharged on Warfarin / Coumadin?   No     · Is patient Post Blood Transfusion?  No    Depression / Suicide Risk    As you are discharged from this [facility] facility, it is important to learn how to keep safe from harming yourself.    Recognize the warning signs:  · Abrupt changes in personality, positive or negative- including increase in energy   · Giving away possessions  · Change in eating patterns- significant weight changes-  positive or negative  · Change in sleeping patterns- unable to sleep or sleeping all the time   · Unwillingness or inability to communicate  · Depression  · Unusual sadness, discouragement and loneliness  · Talk of wanting to die  · Neglect of personal appearance   · Rebelliousness- reckless behavior  · Withdrawal from people/activities they love  · Confusion- inability to concentrate     If you or a loved one observes any of these behaviors or has concerns about self-harm, here's what you can do:  · Talk about it- your feelings and reasons for harming yourself  · Remove any means that you might use to  hurt yourself (examples: pills, rope, extension cords, firearm)  · Get professional help from the community (Mental Health, Substance Abuse, psychological counseling)  · Do not be alone:Call your Safe Contact- someone whom you trust who will be there for you.  · Call your local CRISIS HOTLINE 541-7634 or 033-332-7946  · Call your local Children's Mobile Crisis Response Team [place] (859) 571-8755 or www.[employer]  · Call the toll free National Suicide Prevention Hotlines   · National Suicide Prevention Lifeline 121-216-WXTJ (3516)  · National Hope Line Network 800-SUICIDE (019-7864)

## 2017-09-13 NOTE — CARE PLAN
Problem: Safety  Goal: Will remain free from injury  Outcome: PROGRESSING AS EXPECTED  Bed position low, call light within reach, fall risk education, treaded footwear    Problem: Infection  Goal: Will remain free from infection  Outcome: PROGRESSING AS EXPECTED  Hand hygiene and infection prevention education

## 2017-09-13 NOTE — DISCHARGE SUMMARY
CHIEF COMPLAINT ON ADMISSION  Chief Complaint   Patient presents with   • Possible Stroke   • GLF     x 2 today   • Syncope       CODE STATUS  Full Code    HPI & HOSPITAL COURSE  This is a 37 y.o. 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 It  started at 11pm last night, suddenly. Patient was evaluated by Neurology. CT head was negative; no TPA was done per neuro recommendations. CTA of head showed severe longstanding leukoencephalopathy, which has unknown etiology, as per Neurologist note. Imagings were NGT for acute stroke or intracranial vessels occlusion.  Patient had MRI of the brain done just  on 8/11/17, so no MRI was done during this hospitalization. EEG showed frequent epileptiform discharges arising from the right posterior quadrant, with intermittent slowing in the same region.  Patient's right sided weakness was fluctuating, with periods of significant improvement, when patient was not directly observed by staff, that raised possibility of conversion disorder.  He was evaluated by SLP and PT/OT. Discharging back to group home with HH/PT and FWW  stable condition with close outpatient follow-up.  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    SPECIFIC OUTPATIENT FOLLOW-UP  PCP within 2 weeks    DISCHARGE PROBLEM LIST  Principal Problem:    Right hemiparesis (CMS-HCC) POA: Yes  Active Problems:    Conversion disorder POA: Yes    Hypothyroid POA: Yes    Bipolar disorder (CMS-HCC) POA: Yes    Glaucoma POA: Yes    Asthma POA: Yes    Depression POA: Yes    Hyperglycemia POA: Yes  Resolved Problems:    * No resolved hospital problems. *      FOLLOW UP  No future  appointments.  Long Linares M.D.  [address]  [address]  [place] [address]  [phone]    Schedule an appointment as soon as possible for a visit in 2 weeks  Hospital follow-up appointment with PCP    Long Linares M.D.  [address]  [address]  [place] [address]  [phone]    In 1 week        MEDICATIONS ON DISCHARGE   Norm Prabhakar   Home Medication Instructions WILLIAMS:[id number]    Printed on:09/13/17 7617   Medication Information                      divalproex ER (DEPAKOTE ER) 500 MG TABLET SR 24 HR  Take 3 Tabs by mouth every evening.             latanoprost (XALATAN) 0.005 % Solution  Place 1 Drop in both eyes every evening.             levothyroxine (SYNTHROID) 125 MCG Tab  Take 1 Tab by mouth Every morning on an empty stomach.             sertraline (ZOLOFT) 100 MG Tab  Take 1 Tab by mouth every morning.                 DIET  Orders Placed This Encounter   Procedures   • DIET ORDER     Standing Status:   Standing     Number of Occurrences:   1     Order Specific Question:   Diet:     Answer:   Regular [1]       ACTIVITY  As tolerated and directed by skilled nursing.  Weight bearing as tolerated      CONSULTATIONS  Neurology/Dr Dill    PROCEDURES  EEG    LABORATORY  Lab Results   Component Value Date/Time    SODIUM 137 09/13/2017 03:43 AM    POTASSIUM 3.9 09/13/2017 03:43 AM    CHLORIDE 104 09/13/2017 03:43 AM    CO2 24 09/13/2017 03:43 AM    GLUCOSE 137 (H) 09/13/2017 03:43 AM    BUN 20 09/13/2017 03:43 AM    CREATININE 0.73 09/13/2017 03:43 AM        Lab Results   Component Value Date/Time    WBC 10.0 09/13/2017 03:43 AM    HEMOGLOBIN 13.8 (L) 09/13/2017 03:43 AM    HEMATOCRIT 41.1 (L) 09/13/2017 03:43 AM    PLATELETCT 279 09/13/2017 03:43 AM      ______________________________________________________________________    Interval history/exam for day of discharge:    Denies new issues. Right sided weakness slightly improved.    Vitals:    09/13/17 0000 09/13/17 0430 09/13/17 0800 09/13/17  1200   BP: 109/64 105/60 118/71 120/65   Pulse: 61 60 72 69   Resp: 16 16 18 18   Temp: 36.6 °C (97.8 °F) 36.8 °C (98.2 °F) 36.1 °C (96.9 °F) 36.4 °C (97.5 °F)   SpO2: 96% 95% 93% 91%   Weight:       Height:         Weight/BMI: Body mass index is 35.67 kg/m².  Pulse Oximetry: 91 %, O2 (LPM): 0, O2 Delivery: None (Room Air)    General: In NAD  CVS: S1S2 rrr  PULM: CTA BL  Neuro:  awake, alert, oriented x3. Proptosis on the left eye.  4/5 strength in the right upper extremity, 4/5   strength in the right lower extremity, 5/5 in the left side. Gait not assessed  Skin: no rash        Most Recent Labs:    Lab Results   Component Value Date/Time    WBC 10.0 09/13/2017 03:43 AM    RBC 4.48 (L) 09/13/2017 03:43 AM    HEMOGLOBIN 13.8 (L) 09/13/2017 03:43 AM    HEMATOCRIT 41.1 (L) 09/13/2017 03:43 AM    MCV 91.7 09/13/2017 03:43 AM    MCH 30.8 09/13/2017 03:43 AM    MCHC 33.6 (L) 09/13/2017 03:43 AM    MPV 9.7 09/13/2017 03:43 AM    NEUTSPOLYS 50.10 09/13/2017 03:43 AM    LYMPHOCYTES 34.10 09/13/2017 03:43 AM    MONOCYTES 10.50 09/13/2017 03:43 AM    EOSINOPHILS 3.20 09/13/2017 03:43 AM    BASOPHILS 1.10 09/13/2017 03:43 AM    ANISOCYTOSIS 1+ 09/29/2016 10:47 PM      Lab Results   Component Value Date/Time    SODIUM 137 09/13/2017 03:43 AM    POTASSIUM 3.9 09/13/2017 03:43 AM    CHLORIDE 104 09/13/2017 03:43 AM    CO2 24 09/13/2017 03:43 AM    GLUCOSE 137 (H) 09/13/2017 03:43 AM    BUN 20 09/13/2017 03:43 AM    CREATININE 0.73 09/13/2017 03:43 AM      Lab Results   Component Value Date/Time    ALTSGPT 18 09/12/2017 01:43 AM    ASTSGOT 18 09/12/2017 01:43 AM    ALKPHOSPHAT 59 09/12/2017 01:43 AM    TBILIRUBIN 0.4 09/12/2017 01:43 AM    LIPASE 15 04/15/2017 06:11 PM    ALBUMIN 4.5 09/12/2017 01:43 AM    GLOBULIN 2.6 09/12/2017 01:43 AM    INR 0.95 09/12/2017 01:43 AM     Lab Results   Component Value Date/Time    PROTHROMBTM 13.0 09/12/2017 01:43 AM    INR 0.95 09/12/2017 01:43 AM        CTA head w/c    FINDINGS:  The posterior  circulation shows the distal vertebral arteries to be patent. The vertebrobasilar confluence is intact. The basilar artery is patent. No aneurysm or occlusive lesion is evident.  The anterior circulation shows no stenotic or occlusive lesion. No aneurysm is evident about the [place].  There is no evidence of intracranial hemorrhage. There is again seen extensive bilateral white matter low attenuation.    CTA neck w/c    FINDINGS:  No masses are seen within the lung apices. The common, internal and external carotid arteries are patent bilaterally. The vertebral arteries are patent bilaterally. There are soft tissue masses seen within the anterior neck. The largest measures 3.3 x   2.5 cm in size with smaller dense nodule seen below that level. Normal thyroid gland is not identified.    Total time of the discharge process exceeds 45 minutes

## 2017-09-16 ENCOUNTER — HOME CARE VISIT (OUTPATIENT)
Dept: HOME HEALTH SERVICES | Facility: HOME HEALTHCARE | Age: 37
End: 2017-09-16
Payer: MEDICAID

## 2017-09-16 VITALS
WEIGHT: 306.8 LBS | DIASTOLIC BLOOD PRESSURE: 94 MMHG | RESPIRATION RATE: 18 BRPM | SYSTOLIC BLOOD PRESSURE: 140 MMHG | HEIGHT: 78 IN | TEMPERATURE: 97.8 F | OXYGEN SATURATION: 98 % | HEART RATE: 67 BPM | BODY MASS INDEX: 35.5 KG/M2

## 2017-09-16 PROCEDURE — 665001 SOC-HOME HEALTH

## 2017-09-16 PROCEDURE — G0162 HHC RN E&M PLAN SVS, 15 MIN: HCPCS

## 2017-09-16 SDOH — ECONOMIC STABILITY: HOUSING INSECURITY: UNSAFE APPLIANCES: 0

## 2017-09-16 SDOH — ECONOMIC STABILITY: HOUSING INSECURITY: UNSAFE COOKING RANGE AREA: 0

## 2017-09-16 ASSESSMENT — PATIENT HEALTH QUESTIONNAIRE - PHQ9
1. LITTLE INTEREST OR PLEASURE IN DOING THINGS: 00
2. FEELING DOWN, DEPRESSED, IRRITABLE, OR HOPELESS: 00

## 2017-09-19 ENCOUNTER — HOME CARE VISIT (OUTPATIENT)
Dept: HOME HEALTH SERVICES | Facility: HOME HEALTHCARE | Age: 37
End: 2017-09-19
Payer: MEDICAID

## 2017-09-19 VITALS
SYSTOLIC BLOOD PRESSURE: 135 MMHG | OXYGEN SATURATION: 98 % | TEMPERATURE: 96.9 F | DIASTOLIC BLOOD PRESSURE: 82 MMHG | HEART RATE: 61 BPM

## 2017-09-19 PROCEDURE — G0151 HHCP-SERV OF PT,EA 15 MIN: HCPCS

## 2017-09-19 PROCEDURE — G0152 HHCP-SERV OF OT,EA 15 MIN: HCPCS

## 2017-09-19 ASSESSMENT — BALANCE ASSESSMENTS
STANDING UNSUPPORTED WITH FEET TOGETHER: 2
TURN 360 DEGREES: 2 - ABLE TO TURN 360 DEGREES SAFELY BUT SLOWLY
STANDING UNSUPPORTED WITH EYES CLOSED: 4 - ABLE TO STAND 10 SECONDS SAFELY
TRANSFERS: 4 - ABLE TO TRANSFER SAFELY WITH MINOR USE OF HANDS
STANDING UNSUPPORTED WITH FEET TOGETHER: 2 - ABLE TO PLACE FEET TOGETHER INDEPENDENTLY BUT UNABLE TO HOLD FOR 30 SECONDS
STANDING UNSUPPORTED WITH EYES CLOSED: 4
TURN 360 DEGREES: 2
REACHING FORWARD WITH OUTSTRETCHED ARM WHILE STANDING: 3 - CAN REACH FORWARD 12 CM (5 INCHES)
STANDING TO SITTING: 3 - CONTROLS DESCENT BY USING HANDS
STANDING ON ONE LEG: 1
STANDING UNSUPPORTED ONE FOOT IN FRONT: 0 - LOSES BALANCE WHILE STEPPING OR STANDING
TRANSFERS: 4
STANDING TO SITTING: 3
STANDING ON ONE LEG: 1 - TRIES TO LIFT LEG UNABLE TO HOLD 3 SECONDS BUT REMAINS STANDING INDEPENDENTLY
STANDING UNSUPPORTED: 4
STANDING UNSUPPORTED ONE FOOT IN FRONT: 0
PICK UP OBJECT FROM THE FLOOR FROM A STANDING POSITION: 3 - ABLE TO PICK UP SLIPPER BUT NEEDS SUPERVISION

## 2017-09-19 ASSESSMENT — ACTIVITIES OF DAILY LIVING (ADL): ADLS_COMMENTS: <!--EPICS-->SEE OT EVALUATION<!--EPICE-->

## 2017-09-20 VITALS
RESPIRATION RATE: 18 BRPM | OXYGEN SATURATION: 98 % | HEART RATE: 61 BPM | TEMPERATURE: 96.9 F | DIASTOLIC BLOOD PRESSURE: 82 MMHG | SYSTOLIC BLOOD PRESSURE: 135 MMHG

## 2017-09-20 ASSESSMENT — ACTIVITIES OF DAILY LIVING (ADL)
GROOMING_ASSISTANCE: 1
TELEPHONE_ASSISTANCE: 0
ORAL_CARE_ASSISTANCE: 0
LAUNDRY_ASSISTANCE: 0
SHOPPING_ASSISTANCE: 6
EATING_ASSISTANCE: 0
TOILETING_ASSISTANCE: 0
DRESSING_UB_ASSISTANCE: 0
TOILETING_EQUIPMENT_USED: LOW TOILET SEAT
MEAL_PREP_ASSISTANCE: 1
TOILETING_EQUIPMENT_NEEDED: TOILET RAILS
HOUSEKEEPING_ASSISTANCE: 4
DRESSING_LB_ASSISTANCE: 4
BATHING_ASSISTIVE_EQUIPMENT_NEEDED: SHOWER CHAIR, HANDHELD SHOWER
TRANSPORTATION_ASSISTANCE: 6
BATHING_ASSISTANCE: 1

## 2017-09-20 ASSESSMENT — BALANCE ASSESSMENTS
LONG VERSION TOTAL SCORE (MAX 56): 38
SITTING TO STANDING: 3
SITTING TO STANDING: 3 - ABLE TO STAND INDEPENDENTLY USING HANDS

## 2017-09-21 ENCOUNTER — HOME CARE VISIT (OUTPATIENT)
Dept: HOME HEALTH SERVICES | Facility: HOME HEALTHCARE | Age: 37
End: 2017-09-21
Payer: MEDICAID

## 2017-09-21 VITALS
TEMPERATURE: 96.7 F | OXYGEN SATURATION: 98 % | DIASTOLIC BLOOD PRESSURE: 84 MMHG | RESPIRATION RATE: 16 BRPM | SYSTOLIC BLOOD PRESSURE: 138 MMHG | HEART RATE: 74 BPM

## 2017-09-21 PROCEDURE — G0151 HHCP-SERV OF PT,EA 15 MIN: HCPCS

## 2017-09-21 ASSESSMENT — ACTIVITIES OF DAILY LIVING (ADL)
OASIS_M1830: 03
HOME_HEALTH_OASIS: 04
HOME_HEALTH_OASIS: 01

## 2017-09-22 ENCOUNTER — HOME CARE VISIT (OUTPATIENT)
Dept: HOME HEALTH SERVICES | Facility: HOME HEALTHCARE | Age: 37
End: 2017-09-22
Payer: MEDICAID

## 2017-09-22 PROCEDURE — G0152 HHCP-SERV OF OT,EA 15 MIN: HCPCS

## 2017-09-25 VITALS
RESPIRATION RATE: 15 BRPM | SYSTOLIC BLOOD PRESSURE: 118 MMHG | DIASTOLIC BLOOD PRESSURE: 70 MMHG | OXYGEN SATURATION: 96 % | TEMPERATURE: 99.2 F | HEART RATE: 67 BPM

## 2017-09-26 ENCOUNTER — HOME CARE VISIT (OUTPATIENT)
Dept: HOME HEALTH SERVICES | Facility: HOME HEALTHCARE | Age: 37
End: 2017-09-26
Payer: MEDICAID

## 2017-09-26 VITALS
DIASTOLIC BLOOD PRESSURE: 82 MMHG | SYSTOLIC BLOOD PRESSURE: 122 MMHG | RESPIRATION RATE: 16 BRPM | HEART RATE: 64 BPM | TEMPERATURE: 98 F

## 2017-09-26 PROCEDURE — G0152 HHCP-SERV OF OT,EA 15 MIN: HCPCS

## 2017-09-26 PROCEDURE — G0151 HHCP-SERV OF PT,EA 15 MIN: HCPCS

## 2017-09-26 PROCEDURE — G0153 HHCP-SVS OF S/L PATH,EA 15MN: HCPCS

## 2017-09-26 ASSESSMENT — ENCOUNTER SYMPTOMS: DIFFICULTY THINKING: 1

## 2017-09-27 VITALS
HEART RATE: 64 BPM | TEMPERATURE: 98 F | SYSTOLIC BLOOD PRESSURE: 122 MMHG | DIASTOLIC BLOOD PRESSURE: 82 MMHG | RESPIRATION RATE: 16 BRPM | OXYGEN SATURATION: 96 %

## 2017-09-27 VITALS
HEART RATE: 64 BPM | DIASTOLIC BLOOD PRESSURE: 82 MMHG | SYSTOLIC BLOOD PRESSURE: 122 MMHG | TEMPERATURE: 98 F | OXYGEN SATURATION: 96 % | RESPIRATION RATE: 16 BRPM

## 2017-09-28 ENCOUNTER — HOME CARE VISIT (OUTPATIENT)
Dept: HOME HEALTH SERVICES | Facility: HOME HEALTHCARE | Age: 37
End: 2017-09-28
Payer: MEDICAID

## 2017-09-28 VITALS
OXYGEN SATURATION: 96 % | SYSTOLIC BLOOD PRESSURE: 132 MMHG | HEART RATE: 82 BPM | TEMPERATURE: 98.7 F | RESPIRATION RATE: 16 BRPM | DIASTOLIC BLOOD PRESSURE: 82 MMHG

## 2017-09-28 PROCEDURE — G0151 HHCP-SERV OF PT,EA 15 MIN: HCPCS

## 2017-09-29 ENCOUNTER — HOME CARE VISIT (OUTPATIENT)
Dept: HOME HEALTH SERVICES | Facility: HOME HEALTHCARE | Age: 37
End: 2017-09-29
Payer: MEDICAID

## 2017-09-29 VITALS
SYSTOLIC BLOOD PRESSURE: 102 MMHG | HEART RATE: 53 BPM | TEMPERATURE: 96.7 F | RESPIRATION RATE: 16 BRPM | DIASTOLIC BLOOD PRESSURE: 68 MMHG

## 2017-09-29 PROCEDURE — G0153 HHCP-SVS OF S/L PATH,EA 15MN: HCPCS

## 2017-09-29 PROCEDURE — G0152 HHCP-SERV OF OT,EA 15 MIN: HCPCS

## 2017-10-01 ASSESSMENT — ENCOUNTER SYMPTOMS: DIFFICULTY THINKING: 1

## 2017-10-02 VITALS
DIASTOLIC BLOOD PRESSURE: 68 MMHG | OXYGEN SATURATION: 96 % | RESPIRATION RATE: 16 BRPM | HEART RATE: 53 BPM | TEMPERATURE: 96.7 F | SYSTOLIC BLOOD PRESSURE: 102 MMHG

## 2017-10-03 ENCOUNTER — HOME CARE VISIT (OUTPATIENT)
Dept: HOME HEALTH SERVICES | Facility: HOME HEALTHCARE | Age: 37
End: 2017-10-03
Payer: MEDICAID

## 2017-10-03 PROCEDURE — G0152 HHCP-SERV OF OT,EA 15 MIN: HCPCS

## 2017-10-03 PROCEDURE — G0151 HHCP-SERV OF PT,EA 15 MIN: HCPCS

## 2017-10-04 VITALS
RESPIRATION RATE: 14 BRPM | OXYGEN SATURATION: 98 % | DIASTOLIC BLOOD PRESSURE: 70 MMHG | TEMPERATURE: 98.2 F | SYSTOLIC BLOOD PRESSURE: 118 MMHG | HEART RATE: 75 BPM

## 2017-10-04 VITALS
OXYGEN SATURATION: 98 % | DIASTOLIC BLOOD PRESSURE: 70 MMHG | HEART RATE: 75 BPM | SYSTOLIC BLOOD PRESSURE: 118 MMHG | TEMPERATURE: 98.2 F

## 2017-10-05 ENCOUNTER — HOME CARE VISIT (OUTPATIENT)
Dept: HOME HEALTH SERVICES | Facility: HOME HEALTHCARE | Age: 37
End: 2017-10-05
Payer: MEDICAID

## 2017-10-05 PROCEDURE — G0151 HHCP-SERV OF PT,EA 15 MIN: HCPCS

## 2017-10-06 ENCOUNTER — HOME CARE VISIT (OUTPATIENT)
Dept: HOME HEALTH SERVICES | Facility: HOME HEALTHCARE | Age: 37
End: 2017-10-06
Payer: MEDICAID

## 2017-10-06 VITALS
OXYGEN SATURATION: 95 % | DIASTOLIC BLOOD PRESSURE: 70 MMHG | RESPIRATION RATE: 16 BRPM | HEART RATE: 73 BPM | SYSTOLIC BLOOD PRESSURE: 116 MMHG | TEMPERATURE: 98.2 F

## 2017-10-06 PROCEDURE — G0152 HHCP-SERV OF OT,EA 15 MIN: HCPCS

## 2017-10-06 ASSESSMENT — BALANCE ASSESSMENTS
PICK UP OBJECT FROM THE FLOOR FROM A STANDING POSITION: 3 - ABLE TO PICK UP SLIPPER BUT NEEDS SUPERVISION
STANDING ON ONE LEG: 1 - TRIES TO LIFT LEG UNABLE TO HOLD 3 SECONDS BUT REMAINS STANDING INDEPENDENTLY
SITTING TO STANDING: 4 - ABLE TO STAND WITHOUT USING HANDS AND STABILIZE INDEPENDENTLY
LONG VERSION TOTAL SCORE (MAX 56): 42
STANDING UNSUPPORTED WITH EYES CLOSED: 4 - ABLE TO STAND 10 SECONDS SAFELY
STANDING UNSUPPORTED WITH FEET TOGETHER: 2 - ABLE TO PLACE FEET TOGETHER INDEPENDENTLY BUT UNABLE TO HOLD FOR 30 SECONDS
TURN 360 DEGREES: 2 - ABLE TO TURN 360 DEGREES SAFELY BUT SLOWLY
REACHING FORWARD WITH OUTSTRETCHED ARM WHILE STANDING: 4 - CAN REACH FORWARD CONFIDENTLY 25 CM (10 INCHES)
STANDING UNSUPPORTED: 4
TRANSFERS: 4
STANDING UNSUPPORTED ONE FOOT IN FRONT: 1
TRANSFERS: 4 - ABLE TO TRANSFER SAFELY WITH MINOR USE OF HANDS
TURN 360 DEGREES: 2
STANDING UNSUPPORTED WITH FEET TOGETHER: 2
STANDING UNSUPPORTED ONE FOOT IN FRONT: 1 - NEEDS HELP TO STEP BUT CAN HOLD 15 SECONDS
STANDING UNSUPPORTED WITH EYES CLOSED: 4
STANDING TO SITTING: 4 - SITS SAFELY WITH MINIMAL USE OF HANDS
STANDING ON ONE LEG: 1
SITTING TO STANDING: 4
STANDING TO SITTING: 4

## 2017-10-09 VITALS
RESPIRATION RATE: 16 BRPM | TEMPERATURE: 97.9 F | SYSTOLIC BLOOD PRESSURE: 130 MMHG | DIASTOLIC BLOOD PRESSURE: 96 MMHG | HEART RATE: 66 BPM | OXYGEN SATURATION: 97 %

## 2017-10-09 ASSESSMENT — ACTIVITIES OF DAILY LIVING (ADL)
OASIS_M1830: 00
HOME_HEALTH_OASIS: 00
HOME_HEALTH_OASIS: 01

## 2017-11-18 LAB — EKG IMPRESSION: NORMAL

## 2018-02-01 ENCOUNTER — HOSPITAL ENCOUNTER (INPATIENT)
Facility: MEDICAL CENTER | Age: 38
LOS: 4 days | DRG: 637 | End: 2018-02-05
Attending: EMERGENCY MEDICINE | Admitting: HOSPITALIST
Payer: MEDICAID

## 2018-02-01 ENCOUNTER — APPOINTMENT (OUTPATIENT)
Dept: RADIOLOGY | Facility: MEDICAL CENTER | Age: 38
DRG: 637 | End: 2018-02-01
Attending: HOSPITALIST
Payer: MEDICAID

## 2018-02-01 ENCOUNTER — RESOLUTE PROFESSIONAL BILLING HOSPITAL PROF FEE (OUTPATIENT)
Dept: HOSPITALIST | Facility: MEDICAL CENTER | Age: 38
End: 2018-02-01
Payer: MEDICAID

## 2018-02-01 DIAGNOSIS — E11.00 HYPEROSMOLAR NON-KETOTIC STATE IN PATIENT WITH TYPE 2 DIABETES MELLITUS (HCC): ICD-10-CM

## 2018-02-01 DIAGNOSIS — E87.1 HYPONATREMIA: ICD-10-CM

## 2018-02-01 DIAGNOSIS — E11.9 NEW ONSET TYPE 2 DIABETES MELLITUS (HCC): ICD-10-CM

## 2018-02-01 DIAGNOSIS — R53.1 RIGHT SIDED WEAKNESS: ICD-10-CM

## 2018-02-01 DIAGNOSIS — R73.9 HYPERGLYCEMIA: ICD-10-CM

## 2018-02-01 DIAGNOSIS — F31.9 BIPOLAR AFFECTIVE DISORDER, REMISSION STATUS UNSPECIFIED (HCC): ICD-10-CM

## 2018-02-01 DIAGNOSIS — E01.0 THYROMEGALY: ICD-10-CM

## 2018-02-01 LAB
ALBUMIN SERPL BCP-MCNC: 4 G/DL (ref 3.2–4.9)
ALBUMIN/GLOB SERPL: 1.7 G/DL
ALP SERPL-CCNC: 67 U/L (ref 30–99)
ALT SERPL-CCNC: 22 U/L (ref 2–50)
ANION GAP SERPL CALC-SCNC: 11 MMOL/L (ref 0–11.9)
APPEARANCE UR: CLEAR
AST SERPL-CCNC: 29 U/L (ref 12–45)
B-OH-BUTYR SERPL-MCNC: 1.35 MMOL/L (ref 0.02–0.27)
BASOPHILS # BLD AUTO: 0.9 % (ref 0–1.8)
BASOPHILS # BLD: 0.09 K/UL (ref 0–0.12)
BILIRUB SERPL-MCNC: 0.5 MG/DL (ref 0.1–1.5)
BILIRUB UR QL STRIP.AUTO: NEGATIVE
BUN SERPL-MCNC: 18 MG/DL (ref 8–22)
CALCIUM SERPL-MCNC: 8.3 MG/DL (ref 8.5–10.5)
CHLORIDE SERPL-SCNC: 96 MMOL/L (ref 96–112)
CO2 SERPL-SCNC: 22 MMOL/L (ref 20–33)
COLOR UR: YELLOW
CREAT SERPL-MCNC: 1.01 MG/DL (ref 0.5–1.4)
CULTURE IF INDICATED INDCX: NO UA CULTURE
EOSINOPHIL # BLD AUTO: 0.18 K/UL (ref 0–0.51)
EOSINOPHIL NFR BLD: 1.9 % (ref 0–6.9)
ERYTHROCYTE [DISTWIDTH] IN BLOOD BY AUTOMATED COUNT: 45.2 FL (ref 35.9–50)
EST. AVERAGE GLUCOSE BLD GHB EST-MCNC: 306 MG/DL
GLOBULIN SER CALC-MCNC: 2.4 G/DL (ref 1.9–3.5)
GLUCOSE BLD-MCNC: 293 MG/DL (ref 65–99)
GLUCOSE BLD-MCNC: 470 MG/DL (ref 65–99)
GLUCOSE SERPL-MCNC: 433 MG/DL (ref 65–99)
GLUCOSE UR STRIP.AUTO-MCNC: >=1000 MG/DL
HBA1C MFR BLD: 12.3 % (ref 0–5.6)
HCT VFR BLD AUTO: 38.8 % (ref 42–52)
HGB BLD-MCNC: 13.7 G/DL (ref 14–18)
IMM GRANULOCYTES # BLD AUTO: 0.14 K/UL (ref 0–0.11)
IMM GRANULOCYTES NFR BLD AUTO: 1.5 % (ref 0–0.9)
KETONES UR STRIP.AUTO-MCNC: 80 MG/DL
LEUKOCYTE ESTERASE UR QL STRIP.AUTO: NEGATIVE
LIPASE SERPL-CCNC: 35 U/L (ref 11–82)
LYMPHOCYTES # BLD AUTO: 2.57 K/UL (ref 1–4.8)
LYMPHOCYTES NFR BLD: 26.9 % (ref 22–41)
MAGNESIUM SERPL-MCNC: 1.9 MG/DL (ref 1.5–2.5)
MCH RBC QN AUTO: 32.9 PG (ref 27–33)
MCHC RBC AUTO-ENTMCNC: 35.3 G/DL (ref 33.7–35.3)
MCV RBC AUTO: 93 FL (ref 81.4–97.8)
MICRO URNS: ABNORMAL
MONOCYTES # BLD AUTO: 0.9 K/UL (ref 0–0.85)
MONOCYTES NFR BLD AUTO: 9.4 % (ref 0–13.4)
NEUTROPHILS # BLD AUTO: 5.66 K/UL (ref 1.82–7.42)
NEUTROPHILS NFR BLD: 59.4 % (ref 44–72)
NITRITE UR QL STRIP.AUTO: NEGATIVE
NRBC # BLD AUTO: 0 K/UL
NRBC BLD-RTO: 0 /100 WBC
PH UR STRIP.AUTO: 5 [PH]
PHOSPHATE SERPL-MCNC: 2.3 MG/DL (ref 2.5–4.5)
PLATELET # BLD AUTO: 205 K/UL (ref 164–446)
PMV BLD AUTO: 10.3 FL (ref 9–12.9)
POTASSIUM SERPL-SCNC: 4.5 MMOL/L (ref 3.6–5.5)
PROT SERPL-MCNC: 6.4 G/DL (ref 6–8.2)
PROT UR QL STRIP: NEGATIVE MG/DL
RBC # BLD AUTO: 4.17 M/UL (ref 4.7–6.1)
RBC UR QL AUTO: NEGATIVE
SODIUM SERPL-SCNC: 129 MMOL/L (ref 135–145)
SP GR UR REFRACTOMETRY: >1.045
T4 FREE SERPL-MCNC: 0.78 NG/DL (ref 0.53–1.43)
TSH SERPL DL<=0.005 MIU/L-ACNC: 17.69 UIU/ML (ref 0.38–5.33)
UROBILINOGEN UR STRIP.AUTO-MCNC: 0.2 MG/DL
WBC # BLD AUTO: 9.5 K/UL (ref 4.8–10.8)

## 2018-02-01 PROCEDURE — 84100 ASSAY OF PHOSPHORUS: CPT

## 2018-02-01 PROCEDURE — 82962 GLUCOSE BLOOD TEST: CPT

## 2018-02-01 PROCEDURE — 700105 HCHG RX REV CODE 258: Performed by: HOSPITALIST

## 2018-02-01 PROCEDURE — 99223 1ST HOSP IP/OBS HIGH 75: CPT | Performed by: HOSPITALIST

## 2018-02-01 PROCEDURE — 36415 COLL VENOUS BLD VENIPUNCTURE: CPT

## 2018-02-01 PROCEDURE — 770006 HCHG ROOM/CARE - MED/SURG/GYN SEMI*

## 2018-02-01 PROCEDURE — 81003 URINALYSIS AUTO W/O SCOPE: CPT

## 2018-02-01 PROCEDURE — 700102 HCHG RX REV CODE 250 W/ 637 OVERRIDE(OP): Performed by: HOSPITALIST

## 2018-02-01 PROCEDURE — 84439 ASSAY OF FREE THYROXINE: CPT

## 2018-02-01 PROCEDURE — 83690 ASSAY OF LIPASE: CPT

## 2018-02-01 PROCEDURE — 96360 HYDRATION IV INFUSION INIT: CPT

## 2018-02-01 PROCEDURE — 82010 KETONE BODYS QUAN: CPT

## 2018-02-01 PROCEDURE — 76536 US EXAM OF HEAD AND NECK: CPT

## 2018-02-01 PROCEDURE — 99285 EMERGENCY DEPT VISIT HI MDM: CPT

## 2018-02-01 PROCEDURE — 83735 ASSAY OF MAGNESIUM: CPT

## 2018-02-01 PROCEDURE — 85025 COMPLETE CBC W/AUTO DIFF WBC: CPT

## 2018-02-01 PROCEDURE — 700105 HCHG RX REV CODE 258: Performed by: EMERGENCY MEDICINE

## 2018-02-01 PROCEDURE — 83036 HEMOGLOBIN GLYCOSYLATED A1C: CPT

## 2018-02-01 PROCEDURE — 96361 HYDRATE IV INFUSION ADD-ON: CPT

## 2018-02-01 PROCEDURE — 84443 ASSAY THYROID STIM HORMONE: CPT

## 2018-02-01 PROCEDURE — 80053 COMPREHEN METABOLIC PANEL: CPT

## 2018-02-01 RX ORDER — BISACODYL 10 MG
10 SUPPOSITORY, RECTAL RECTAL
Status: DISCONTINUED | OUTPATIENT
Start: 2018-02-01 | End: 2018-02-05 | Stop reason: HOSPADM

## 2018-02-01 RX ORDER — SERTRALINE HYDROCHLORIDE 100 MG/1
100 TABLET, FILM COATED ORAL EVERY MORNING
Status: DISCONTINUED | OUTPATIENT
Start: 2018-02-02 | End: 2018-02-05 | Stop reason: HOSPADM

## 2018-02-01 RX ORDER — LEVOTHYROXINE SODIUM 0.03 MG/1
125 TABLET ORAL
Status: DISCONTINUED | OUTPATIENT
Start: 2018-02-02 | End: 2018-02-05 | Stop reason: HOSPADM

## 2018-02-01 RX ORDER — DIVALPROEX SODIUM 250 MG/1
1000 TABLET, DELAYED RELEASE ORAL 2 TIMES DAILY
Status: DISCONTINUED | OUTPATIENT
Start: 2018-02-01 | End: 2018-02-05 | Stop reason: HOSPADM

## 2018-02-01 RX ORDER — DEXTROSE MONOHYDRATE 25 G/50ML
25 INJECTION, SOLUTION INTRAVENOUS
Status: DISCONTINUED | OUTPATIENT
Start: 2018-02-01 | End: 2018-02-05 | Stop reason: HOSPADM

## 2018-02-01 RX ORDER — SODIUM CHLORIDE 9 MG/ML
1000 INJECTION, SOLUTION INTRAVENOUS ONCE
Status: COMPLETED | OUTPATIENT
Start: 2018-02-01 | End: 2018-02-01

## 2018-02-01 RX ORDER — LATANOPROST 50 UG/ML
1 SOLUTION/ DROPS OPHTHALMIC NIGHTLY
Status: DISCONTINUED | OUTPATIENT
Start: 2018-02-01 | End: 2018-02-05 | Stop reason: HOSPADM

## 2018-02-01 RX ORDER — SODIUM CHLORIDE 9 MG/ML
INJECTION, SOLUTION INTRAVENOUS CONTINUOUS
Status: DISCONTINUED | OUTPATIENT
Start: 2018-02-01 | End: 2018-02-03

## 2018-02-01 RX ORDER — DIPHENHYDRAMINE HCL 25 MG
25 TABLET ORAL EVERY 6 HOURS PRN
Status: DISCONTINUED | OUTPATIENT
Start: 2018-02-01 | End: 2018-02-05 | Stop reason: HOSPADM

## 2018-02-01 RX ORDER — POLYETHYLENE GLYCOL 3350 17 G/17G
1 POWDER, FOR SOLUTION ORAL
Status: DISCONTINUED | OUTPATIENT
Start: 2018-02-01 | End: 2018-02-05 | Stop reason: HOSPADM

## 2018-02-01 RX ORDER — AMOXICILLIN 250 MG
2 CAPSULE ORAL 2 TIMES DAILY
Status: DISCONTINUED | OUTPATIENT
Start: 2018-02-01 | End: 2018-02-05 | Stop reason: HOSPADM

## 2018-02-01 RX ORDER — DIVALPROEX SODIUM 500 MG/1
500-1500 TABLET, DELAYED RELEASE ORAL 2 TIMES DAILY
Status: ON HOLD | COMMUNITY
End: 2019-01-10

## 2018-02-01 RX ORDER — ACETAMINOPHEN 325 MG/1
650 TABLET ORAL EVERY 6 HOURS PRN
Status: DISCONTINUED | OUTPATIENT
Start: 2018-02-01 | End: 2018-02-05 | Stop reason: HOSPADM

## 2018-02-01 RX ADMIN — SODIUM CHLORIDE: 9 INJECTION, SOLUTION INTRAVENOUS at 20:14

## 2018-02-01 RX ADMIN — SODIUM CHLORIDE 1000 ML: 9 INJECTION, SOLUTION INTRAVENOUS at 16:31

## 2018-02-01 RX ADMIN — INSULIN HUMAN 5 UNITS: 100 INJECTION, SOLUTION PARENTERAL at 20:13

## 2018-02-01 ASSESSMENT — PATIENT HEALTH QUESTIONNAIRE - PHQ9
3. TROUBLE FALLING OR STAYING ASLEEP OR SLEEPING TOO MUCH: SEVERAL DAYS
2. FEELING DOWN, DEPRESSED, IRRITABLE, OR HOPELESS: SEVERAL DAYS
1. LITTLE INTEREST OR PLEASURE IN DOING THINGS: NOT AT ALL
4. FEELING TIRED OR HAVING LITTLE ENERGY: SEVERAL DAYS
7. TROUBLE CONCENTRATING ON THINGS, SUCH AS READING THE NEWSPAPER OR WATCHING TELEVISION: SEVERAL DAYS
SUM OF ALL RESPONSES TO PHQ9 QUESTIONS 1 AND 2: 1
6. FEELING BAD ABOUT YOURSELF - OR THAT YOU ARE A FAILURE OR HAVE LET YOURSELF OR YOUR FAMILY DOWN: SEVERAL DAYS
SUM OF ALL RESPONSES TO PHQ QUESTIONS 1-9: 7
8. MOVING OR SPEAKING SO SLOWLY THAT OTHER PEOPLE COULD HAVE NOTICED. OR THE OPPOSITE, BEING SO FIGETY OR RESTLESS THAT YOU HAVE BEEN MOVING AROUND A LOT MORE THAN USUAL: SEVERAL DAYS
9. THOUGHTS THAT YOU WOULD BE BETTER OFF DEAD, OR OF HURTING YOURSELF: NOT AT ALL
5. POOR APPETITE OR OVEREATING: SEVERAL DAYS

## 2018-02-01 ASSESSMENT — LIFESTYLE VARIABLES
ON A TYPICAL DAY WHEN YOU DRINK ALCOHOL HOW MANY DRINKS DO YOU HAVE: 3
TOTAL SCORE: 0
TOTAL SCORE: 0
EVER HAD A DRINK FIRST THING IN THE MORNING TO STEADY YOUR NERVES TO GET RID OF A HANGOVER: NO
HOW MANY TIMES IN THE PAST YEAR HAVE YOU HAD 5 OR MORE DRINKS IN A DAY: 1
EVER_SMOKED: YES
HAVE YOU EVER FELT YOU SHOULD CUT DOWN ON YOUR DRINKING: NO
ALCOHOL_USE: YES
AVERAGE NUMBER OF DAYS PER WEEK YOU HAVE A DRINK CONTAINING ALCOHOL: 1
HAVE PEOPLE ANNOYED YOU BY CRITICIZING YOUR DRINKING: NO
CONSUMPTION TOTAL: POSITIVE
EVER FELT BAD OR GUILTY ABOUT YOUR DRINKING: NO
TOTAL SCORE: 0

## 2018-02-01 ASSESSMENT — PAIN SCALES - GENERAL: PAINLEVEL_OUTOF10: 0

## 2018-02-01 NOTE — ED TRIAGE NOTES
Pt to green 25.  Connected to cardiac monitor, BP cuff, and continuous pulse ox upon arrival.  Pt in gown, call light in reach, bed in lowest position.  Chart up for ERP.

## 2018-02-01 NOTE — ED TRIAGE NOTES
.  Chief Complaint   Patient presents with   • Abnormal Labs     Sent by PCP, for abnormal labs.  High blood sugar,  High thyroid and high cholesterol.  States nauseated light headed.  fsbs 470 at triage.  Pt indicates his throat is getting larger from outside and has pain w/ swallow recently.  Denies hx DM.  asked to  inform staff of any changes, educated in triage.

## 2018-02-02 ENCOUNTER — APPOINTMENT (OUTPATIENT)
Dept: RADIOLOGY | Facility: MEDICAL CENTER | Age: 38
DRG: 637 | End: 2018-02-02
Attending: HOSPITALIST
Payer: MEDICAID

## 2018-02-02 PROBLEM — E11.9 NEW ONSET TYPE 2 DIABETES MELLITUS (HCC): Status: ACTIVE | Noted: 2018-02-02

## 2018-02-02 PROBLEM — R22.1 NECK MASS: Status: ACTIVE | Noted: 2018-02-02

## 2018-02-02 PROBLEM — E11.00 HYPEROSMOLAR NON-KETOTIC STATE IN PATIENT WITH TYPE 2 DIABETES MELLITUS (HCC): Status: ACTIVE | Noted: 2018-02-02

## 2018-02-02 LAB
GLUCOSE BLD-MCNC: 256 MG/DL (ref 65–99)
GLUCOSE BLD-MCNC: 280 MG/DL (ref 65–99)
GLUCOSE BLD-MCNC: 351 MG/DL (ref 65–99)
GLUCOSE BLD-MCNC: 353 MG/DL (ref 65–99)
GLUCOSE BLD-MCNC: 377 MG/DL (ref 65–99)

## 2018-02-02 PROCEDURE — 90686 IIV4 VACC NO PRSV 0.5 ML IM: CPT | Performed by: HOSPITALIST

## 2018-02-02 PROCEDURE — 700102 HCHG RX REV CODE 250 W/ 637 OVERRIDE(OP): Performed by: HOSPITALIST

## 2018-02-02 PROCEDURE — 3E0234Z INTRODUCTION OF SERUM, TOXOID AND VACCINE INTO MUSCLE, PERCUTANEOUS APPROACH: ICD-10-PCS | Performed by: HOSPITALIST

## 2018-02-02 PROCEDURE — 700105 HCHG RX REV CODE 258: Performed by: HOSPITALIST

## 2018-02-02 PROCEDURE — 700117 HCHG RX CONTRAST REV CODE 255: Performed by: HOSPITALIST

## 2018-02-02 PROCEDURE — 700111 HCHG RX REV CODE 636 W/ 250 OVERRIDE (IP): Performed by: HOSPITALIST

## 2018-02-02 PROCEDURE — A9270 NON-COVERED ITEM OR SERVICE: HCPCS | Performed by: HOSPITALIST

## 2018-02-02 PROCEDURE — 82962 GLUCOSE BLOOD TEST: CPT

## 2018-02-02 PROCEDURE — 99233 SBSQ HOSP IP/OBS HIGH 50: CPT | Performed by: HOSPITALIST

## 2018-02-02 PROCEDURE — 90471 IMMUNIZATION ADMIN: CPT

## 2018-02-02 PROCEDURE — 70492 CT SFT TSUE NCK W/O & W/DYE: CPT

## 2018-02-02 PROCEDURE — 770006 HCHG ROOM/CARE - MED/SURG/GYN SEMI*

## 2018-02-02 RX ADMIN — LEVOTHYROXINE SODIUM 125 MCG: 25 TABLET ORAL at 06:38

## 2018-02-02 RX ADMIN — INSULIN HUMAN 10 UNITS: 100 INJECTION, SUSPENSION SUBCUTANEOUS at 06:39

## 2018-02-02 RX ADMIN — INSULIN HUMAN 10 UNITS: 100 INJECTION, SUSPENSION SUBCUTANEOUS at 17:11

## 2018-02-02 RX ADMIN — STANDARDIZED SENNA CONCENTRATE AND DOCUSATE SODIUM 2 TABLET: 8.6; 5 TABLET, FILM COATED ORAL at 19:56

## 2018-02-02 RX ADMIN — SODIUM CHLORIDE: 9 INJECTION, SOLUTION INTRAVENOUS at 15:31

## 2018-02-02 RX ADMIN — LATANOPROST 1 DROP: 50 SOLUTION OPHTHALMIC at 00:27

## 2018-02-02 RX ADMIN — ACETAMINOPHEN 650 MG: 325 TABLET, FILM COATED ORAL at 08:46

## 2018-02-02 RX ADMIN — DIVALPROEX SODIUM 1000 MG: 250 TABLET, DELAYED RELEASE ORAL at 00:05

## 2018-02-02 RX ADMIN — DIVALPROEX SODIUM 1000 MG: 250 TABLET, DELAYED RELEASE ORAL at 08:46

## 2018-02-02 RX ADMIN — IOHEXOL 80 ML: 350 INJECTION, SOLUTION INTRAVENOUS at 22:15

## 2018-02-02 RX ADMIN — STANDARDIZED SENNA CONCENTRATE AND DOCUSATE SODIUM 2 TABLET: 8.6; 5 TABLET, FILM COATED ORAL at 08:46

## 2018-02-02 RX ADMIN — SERTRALINE 100 MG: 100 TABLET, FILM COATED ORAL at 08:46

## 2018-02-02 RX ADMIN — INSULIN HUMAN 8 UNITS: 100 INJECTION, SOLUTION PARENTERAL at 23:03

## 2018-02-02 RX ADMIN — SODIUM CHLORIDE: 9 INJECTION, SOLUTION INTRAVENOUS at 08:46

## 2018-02-02 RX ADMIN — INSULIN HUMAN 5 UNITS: 100 INJECTION, SOLUTION PARENTERAL at 17:10

## 2018-02-02 RX ADMIN — INSULIN HUMAN 8 UNITS: 100 INJECTION, SOLUTION PARENTERAL at 01:32

## 2018-02-02 RX ADMIN — DIVALPROEX SODIUM 1000 MG: 250 TABLET, DELAYED RELEASE ORAL at 19:56

## 2018-02-02 RX ADMIN — INSULIN HUMAN 5 UNITS: 100 INJECTION, SOLUTION PARENTERAL at 06:39

## 2018-02-02 RX ADMIN — LATANOPROST 1 DROP: 50 SOLUTION OPHTHALMIC at 19:57

## 2018-02-02 RX ADMIN — INSULIN HUMAN 8 UNITS: 100 INJECTION, SOLUTION PARENTERAL at 11:28

## 2018-02-02 RX ADMIN — INFLUENZA A VIRUS A/MICHIGAN/45/2015 X-275 (H1N1) ANTIGEN (FORMALDEHYDE INACTIVATED), INFLUENZA A VIRUS A/HONG KONG/4801/2014 X-263B (H3N2) ANTIGEN (FORMALDEHYDE INACTIVATED), INFLUENZA B VIRUS B/PHUKET/3073/2013 ANTIGEN (FORMALDEHYDE INACTIVATED), AND INFLUENZA B VIRUS B/BRISBANE/60/2008 ANTIGEN (FORMALDEHYDE INACTIVATED) 0.5 ML: 15; 15; 15; 15 INJECTION, SUSPENSION INTRAMUSCULAR at 11:36

## 2018-02-02 ASSESSMENT — LIFESTYLE VARIABLES: SUBSTANCE_ABUSE: 0

## 2018-02-02 ASSESSMENT — ENCOUNTER SYMPTOMS
DIZZINESS: 0
WEAKNESS: 0
CLAUDICATION: 0
PALPITATIONS: 0
ABDOMINAL PAIN: 0
BACK PAIN: 0
HEMOPTYSIS: 0
NAUSEA: 0
FOCAL WEAKNESS: 0
CONSTIPATION: 0
CHILLS: 0
SORE THROAT: 0
EYE PAIN: 0
SPUTUM PRODUCTION: 0
SENSORY CHANGE: 0
HEADACHES: 0
SPEECH CHANGE: 0
FEVER: 0
VOMITING: 0
DIARRHEA: 0
MYALGIAS: 1
NECK PAIN: 0
WHEEZING: 0
EYE DISCHARGE: 0
COUGH: 0
LOSS OF CONSCIOUSNESS: 0
DIAPHORESIS: 0
BRUISES/BLEEDS EASILY: 0
DEPRESSION: 0
SHORTNESS OF BREATH: 0

## 2018-02-02 ASSESSMENT — PAIN SCALES - GENERAL
PAINLEVEL_OUTOF10: 0
PAINLEVEL_OUTOF10: 5

## 2018-02-02 NOTE — PROGRESS NOTES
Pt A&O x's 4, VSS, complaining of a headache of 5/5, medicated per MAR, denies any other pain at this time. Full body rash noted to skin, pt claims to have an allergy to his soap. Large goiter noted mid to right anterior throat region, denies any difficulty breathing or swallowing. Pt has + void, tolerating diet, BS still uncontrolled, will cover per sliding scale. POC has been discussed with pt, questions and concerns have been addressed. Bed in lowest, locked position, call light and personal items within reach, will continue to monitor.

## 2018-02-02 NOTE — H&P
DATE OF ADMISSION:  02/01/2018    PRIMARY CARE PHYSICIAN:  Long Linares MD    CHIEF COMPLAINT:  Referred to [facility] with uncontrolled blood sugars,   increasing goiter size.    HISTORY OF PRESENT ILLNESS:  Patient is a 37-year-old male with history of   bipolar disorder, conversion disorder, goiter, referred to [facility] with   uncontrolled blood sugars and enlarging goiter.  Patient reports increasing   thirst, urinary frequency and fluid intake.  Reports at times tingling in his   hands and lightheadedness.  No weight loss.  He has not had chest pain or   shortness of breath.  No difficulty swallowing.  Reports has increasing neck   mass with swelling.  Has history of goiter removal in 1998.  Has also recently   experienced rash due to his [place] soap, has changed to Zest with   improvement in symptoms.    REVIEW OF SYSTEMS:  As above, otherwise negative according to AMA and CMS   criteria.    PAST MEDICAL HISTORY:  Includes bipolar disorder with history of conversion   disorder, right-sided weakness, asthma, glaucoma, seizure, left eye blind,   hypothyroidism.    PAST SURGICAL HISTORY:  Includes goiter removal in 1998, unclear details.  He   has had eye surgery, hernia repair.    HOME MEDICATIONS:  Include Depakote 500 mg 2 tabs b.i.d., Xalatan eye drop 1   drop both eyes q. evening, Synthroid 125 mcg daily, Zoloft 100 mg daily.    ALLERGIES:  ABILIFY, FISH.    SOCIAL HISTORY:  No tobacco or alcohol, lives at a group home.  Occasional   pot.    FAMILY HISTORY:  Mother had CVA.    PHYSICAL EXAMINATION:  CURRENT VITAL SIGNS:  Revealed a temperature of 36, pulse 60s-70s, respiratory   rate was 23-14, 94% on 4 liters, blood pressure 138/79, 138 kg.  GENERAL:  Patient was alert, appropriately oriented.  No apparent distress.    Severely obese.  HEENT:  Anicteric.  Left eye injected with iris opacified.  Mucous membranes   were moist.  NECK:  Supple.  No cervical or supraclavicular adenopathy.  He had central    thyromegaly, smooth.  CARDIOVASCULAR:  Regular rate and rhythm.  No murmurs.  LUNGS:  Clear to auscultation bilaterally.  Normal chest wall excursion   effort.  ABDOMEN:  Obese, soft, nontender, nondistended, no hepatosplenomegaly.  BACK:  No CVA or paraspinal tenderness.  EXTREMITIES:  There is no lower extremity edema, negative Homans sign,   symmetric, generalized 5/5 strength.  NEUROLOGIC:  Cranial nerves grossly intact.  No focal weakness.  SKIN:  There was erythematous maculopapular rash over legs, thighs and   abdomen.  PSYCHIATRIC:  Calm, cooperative without depressed affect.    LABORATORY DATA:  Patient's labs reveal white count 9, hemoglobin 13.7,   platelet count of 205,000.  BUN and creatinine 18 and 1.01, blood sugar 433,   phosphorus 2.3.    Patient's TSH of 17 and free T4 of 0.78.    IMPRESSION AND PLAN:  1.  Newly diagnosed diabetes mellitus with hyperglycemia.  Patient will be   admitted acute to medical floor.  We will add NPH.  Monitor serial Accu-Cheks.    Provide insulin sliding scale coverage, diet and diabetic education.  2.  Hyponatremia, hyperosmolar/pseudohyponatremia.  We will plan correct   hypoglycemia as above.  IV normal saline, fluid support.  Follow up   electrolytes.  3.  Enlarging goiter with abnormal thyroid function with elevated TSH.  We   will have further evaluation with thyroid ultrasound.  Continue Synthroid,   will need endocrinology referral.  4.  History of bipolar disorder.  Continue with Depakote, Zoloft.  5.  History of glaucoma.  Resume Xalatan eye drops.  6.  Left eye blind.    Acute inpatient admission greater than 2-midnight stay.       ____________________________________     MD FROYLAN STAFFORD / ADORE    DD:  02/02/2018 01:09:06  DT:  02/02/2018 01:54:34    D#:  [id number]  Job#:  [id number]

## 2018-02-02 NOTE — PROGRESS NOTES
[facility] Hospitalist Progress Note    Date of Service: 2018    Chief Complaint  37 y.o. male admitted 2018 with history of bipolar disorder, glaucoma left eye blindness, conversion disorder, goiter removal  with new onset uncontrolled blood sugars and increasing neck mass and swelling    Interval Problem Update  :  Polydipsia and polyuria for weeks prior to admission.  U/s no thyroid mass, vascular.  Ordered CT soft tissue with and w/o contrast to eval.  Diabetic educator, placed on diabetic diet.  Hg a1c 12.3%.  IVFs 150/hr.    Consultants/Specialty  Diabetic educator    Disposition  Home, lives with mother.  Has glaucoma blindness left eye.        Review of Systems   Constitutional: Positive for malaise/fatigue. Negative for chills, diaphoresis and fever.   HENT: Negative for congestion and sore throat.    Eyes: Negative for pain and discharge.   Respiratory: Negative for cough, hemoptysis, sputum production, shortness of breath and wheezing.    Cardiovascular: Negative for chest pain, palpitations and claudication.   Gastrointestinal: Negative for abdominal pain, constipation, diarrhea, melena, nausea and vomiting.   Genitourinary: Positive for frequency. Negative for dysuria and urgency.   Musculoskeletal: Positive for myalgias. Negative for back pain, joint pain and neck pain.   Skin: Negative for itching and rash.   Neurological: Negative for dizziness, sensory change, speech change, focal weakness, loss of consciousness, weakness and headaches.   Endo/Heme/Allergies: Does not bruise/bleed easily.   Psychiatric/Behavioral: Negative for depression, substance abuse and suicidal ideas.      Physical Exam  Laboratory/Imaging   Hemodynamics  Temp (24hrs), Av.5 °C (97.7 °F), Min:35.7 °C (96.3 °F), Max:37.1 °C (98.8 °F)   Temperature: (!) 35.7 °C (96.3 °F)  Pulse  Av.6  Min: 50  Max: 78 Heart Rate (Monitored): (!) 55  Blood Pressure: 107/66, NIBP: 122/66      Respiratory      Respiration: 17,  Pulse Oximetry: 92 %             Fluids    Intake/Output Summary (Last 24 hours) at 02/02/18 0826  Last data filed at 02/02/18 0200   Gross per 24 hour   Intake                0 ml   Output              400 ml   Net             -400 ml       Nutrition  Orders Placed This Encounter   Procedures   • Diet Order     Standing Status:   Standing     Number of Occurrences:   1     Order Specific Question:   Diet:     Answer:   Regular [1]     Physical Exam   Constitutional: He is oriented to person, place, and time. He appears well-developed and well-nourished. No distress.   HENT:   Head: Normocephalic and atraumatic.   Mouth/Throat: No oropharyngeal exudate.   Ketotic breath on exam, dry mucus membranes   Eyes: Conjunctivae and EOM are normal. Right eye exhibits no discharge. Left eye exhibits no discharge. No scleral icterus.   Left eye proptosis, blindness 2/2 glaucoma   Neck: Normal range of motion. Neck supple. No JVD present. No tracheal deviation present. No thyromegaly present.   Enlargement nontender anterior neck, anterior to thyroid gland.   Cardiovascular: Normal rate, regular rhythm and normal heart sounds.  Exam reveals no gallop and no friction rub.    No murmur heard.  Pulmonary/Chest: Effort normal and breath sounds normal. No respiratory distress. He has no wheezes. He has no rales. He exhibits no tenderness.   Abdominal: Soft. Bowel sounds are normal. He exhibits no distension and no mass. There is no tenderness. There is no rebound and no guarding.   Musculoskeletal: Normal range of motion. He exhibits no edema or tenderness.   Lymphadenopathy:     He has no cervical adenopathy.   Neurological: He is alert and oriented to person, place, and time. No cranial nerve deficit. He exhibits normal muscle tone.   Skin: Skin is warm and dry. No rash noted. He is not diaphoretic. No erythema.   Psychiatric: He has a normal mood and affect. His behavior is normal. Judgment and thought content normal.   Nursing  note and vitals reviewed.      Recent Labs      02/01/18   1309   WBC  9.5   RBC  4.17*   HEMOGLOBIN  13.7*   HEMATOCRIT  38.8*   MCV  93.0   MCH  32.9   MCHC  35.3   RDW  45.2   PLATELETCT  205   MPV  10.3     Recent Labs      02/01/18   1309   SODIUM  129*   POTASSIUM  4.5   CHLORIDE  96   CO2  22   GLUCOSE  433*   BUN  18   CREATININE  1.01   CALCIUM  8.3*                      Assessment/Plan     * Hyperosmolar non-ketotic state in patient with type 2 diabetes mellitus (CMS-HCC)- (present on admission)   Assessment & Plan    Check for ketones  Hyperglycemia 450s  Aggressive IVFs, continue NS 150hr  Ketotic breath on exam  New onset diabetes   2/2 changed to diabetic diet, SSI  Diabetic education  Replace electrolytes.        Bipolar disorder (CMS-HCC)- (present on admission)   Assessment & Plan    Continue home meds.        Hypothyroidism- (present on admission)   Assessment & Plan    TSH 12, free thyroid low, on 125mcg daily synthroid  U/s mass does not appear to be originating from thyroid.          New onset type 2 diabetes mellitus (CMS-HCC)   Assessment & Plan    a1c 12.3%  Polydipsia, polyuria  Diabetic diet, education, SSI        Neck mass   Assessment & Plan    Ordered CT soft tissue w/ and w/o.  U/s not thyroid tissue, but vascular.        Hyperglycemia- (present on admission)   Assessment & Plan    Glucose 400s.          Glaucoma- (present on admission)   Assessment & Plan    Resulting in left eye blindness and proptosis.          Quality-Core Measures

## 2018-02-02 NOTE — ED NOTES
Pt states he was told by PCP to come to ED because of high glucose, high thyroid and high cholesterol levels. Pt denies hx of DM, . Pt denies pain. A/o x4, speaking in full sentences.

## 2018-02-02 NOTE — DIETARY
Nutrition Services:   Consult received for diabetic diet education. Attempted to visit pt for diabetic diet education, pt was sleeping. Per H&P Newly diagnosed diabetes mellitus with hyperglycemia. RD will follow up for diet education.      RD available prn.

## 2018-02-02 NOTE — DISCHARGE PLANNING
Transitional Care Navigator:    Per chart review patient has been on service with [facility]. Consider referral to HH to resume services prior to DC, pending DC of home.

## 2018-02-02 NOTE — ED NOTES
Pt's mom Patricia Prabhakar called, given update with permission from pt. Mom will update group home of pt's hospitalization. Mom's contact is [phone]

## 2018-02-02 NOTE — ED NOTES
Pt requested this RN to call his staff member and updated her, the phone number given went to voicemail, no message was left.

## 2018-02-02 NOTE — PROGRESS NOTES
Pt arrived from ER A&Ox4, Two RN skin check completed with Andria Dubose. Pt has red rash all over body from soap use. Pt has very calloused feet but no open areas. Pts bottom is red but blanching. No open areas found.

## 2018-02-02 NOTE — ED PROVIDER NOTES
"ED Provider Note  CHIEF COMPLAINT  Chief Complaint   Patient presents with   • Abnormal Labs     Sent by PCP, for abnormal labs.  High blood sugar,  High thyroid and high cholesterol.  States nauseated light headed.  fsbs 470 at triage.  Pt indicates his throat is getting larger from outside and has pain w/ swallow recently.  Denies hx DM.  asked to  inform staff of any changes, educated in triage.       HPI  Norm Prabhakar is a 37 y.o. male who presents from a group care facility. Apparently he has some medical issues and he had thyroid problems and a normal large thyroid is all his family doctor yesterday and the family doctor ordered some lab work she called today told to go to the ER causes blood sugar was elevated and his thyroid studies were off. I reviewed his lab results and his CBC was normal and his chemistry panel is normal except for his blood sugar was high. His thyroid studies were unavailable. The 70s had some general aches and pains and then feel well but on that he is doing fine. No difficulty swallowing. No difficulty breathing.    REVIEW OF SYSTEMS  No headache, no jaw pain, no chest pain. No difficulty breathing or swallowing. He does complain of enlarged thyroid. No abdominal pain.  ALL OTHER SYSTEMS NEGATIVE    ALLERGIES  Allergies   Allergen Reactions   • Abilify Unspecified     \"Feeling tired, like I don't even know whats going on around me\"   • Fish      Pt reports fish causes him to be sick to his stomach         CURRENT MEDICATIONS  Home Medications    **Home medications have not yet been reviewed for this encounter**         PAST MEDICAL HISTORY  Past Medical History:   Diagnosis Date   • Anxiety     BIPOLAR   • ASTHMA    • Bipolar 1 disorder (CMS-[facility])    • Depression    • Fall     passed out 2 wks ago   • Glaucoma    • Glaucoma 1982    both eyes/ blind on left eye   • Hypothyroidism    • Indigestion     once in a while   • Mental disorder     learning disabilities; speech impairment; " "developmental delays   • Murmur     since birth   • Pneumonia     remote   • Psychiatric problem 2002    PTSD   • S/P thyroidectomy    • Seizure (CMS-HCC) 2010   • Seizure disorder (CMS-HCC)    • Unspecified disorder of thyroid        SURGICAL HISTORY  Past Surgical History:   Procedure Laterality Date   • EYE SURGERY     • OTHER      Hernia Repair when he was 10 yrs old   • THYROID LOBECTOMY         FAMILY HISTORY  Family History   Problem Relation Age of Onset   • Hypertension Mother    • Heart Disease Mother    • Lung Disease Mother    • Stroke Maternal Grandmother        SOCIAL HISTORY  Social History     Social History   • Marital status: Single     Spouse name: N/A   • Number of children: N/A   • Years of education: N/A     Social History Main Topics   • Smoking status: Former Smoker     Packs/day: 0.25     Years: 4.00     Types: Cigarettes     Quit date: 1/1/2014   • Smokeless tobacco: Never Used   • Alcohol use No   • Drug use: Yes     Types: Inhaled      Comment: marijuana   • Sexual activity: Not on file     Other Topics Concern   • Not on file     Social History Narrative   • No narrative on file       PHYSICAL EXAM  GENERAL: Alert male adult with a funny look and left eye is blind in his left eye. Dehydration with dry mucous membranes  VITAL SIGNS: /79   Pulse 78   Temp 36.6 °C (97.9 °F)   Resp 20   Ht 1.981 m (6' 6\")   Wt (!) 138.6 kg (305 lb 8.9 oz)   SpO2 92%   BMI 35.31 kg/m²    Constitutional: Alert dehydrated male with dry mucous membranes. Adult HENT: Scalp is normal size and nontender. Ears are clear. Nose is clear. Throat is clear with no stridor no drooling no trismus. Teeth are all intact.  Eyes: Pupils equal round and reactive to light, extraocular motor fall. There is no scleral icterus.  Neck: Neck is supple and nontender. There is no meningismus. No adenitis. No thyromegaly.  Lymphatic: No adenopathy.   Cardiovascular: Heart regular rhythm without murmurs or gallops   Thorax & " Lungs: No chest wall tenderness. Lungs are clear. Patient has good breath sounds bilateral. No rales, no rhonchi, no wheezes.  Abdomen: Abdomen is soft, nontender, not rigid, no guarding, and no organomegaly. There is no palpable hernia   Skin: Warm, pink, and dry with no erythema and no rash.   Back: Nontender, no midline bony tenderness, no flank tenderness.  Extremities: Full range of motion  No tenderness to palpation and no deformities noted. No calf or thigh swelling. No calf or thigh tenderness. No clinical DVT.  Neurologic: Alert & oriented . Cranial nerves are grossly intact as tested. Patient moves all 4 extremities well. Patient has good strong flexion and extension of the ankle joints knee joints hip joints and elbow joints. Sensation is normal and symmetrical in the upper and lower extremities.   Psychiatric: Patient is alert oriented coherent and rational.       COURSE & MEDICAL DECISION MAKING  Patient presents for evaluation of some abnormal lab results ordered by his family physician. CBC was normal today. His chemistry panel was normal except for his blood sugar was high. Thyroid studies are not available.    Plan: #1 IV #2 bolus of IV fluids for rehydration. He has a high blood sugar and he appears somewhat dehydrated with dry mucous membranes. #3. Magnesium and phosphorus and thyroid studies and other lab. 4. Observation in the ED.      Laboratory and reexamination:  CBC is normal with no anemia and no bandemia. Sodium is little bit low at 129. Glucose 433. Acetone is positive. TSH is elevated. He needs him and phosphorus normal.  Results for orders placed or performed during the hospital encounter of 02/01/18   CBC WITH DIFFERENTIAL   Result Value Ref Range    WBC 9.5 4.8 - 10.8 K/uL    RBC 4.17 (L) 4.70 - 6.10 M/uL    Hemoglobin 13.7 (L) 14.0 - 18.0 g/dL    Hematocrit 38.8 (L) 42.0 - 52.0 %    MCV 93.0 81.4 - 97.8 fL    MCH 32.9 27.0 - 33.0 pg    MCHC 35.3 33.7 - 35.3 g/dL    RDW 45.2 35.9 -  50.0 fL    Platelet Count 205 164 - 446 K/uL    MPV 10.3 9.0 - 12.9 fL    Neutrophils-Polys 59.40 44.00 - 72.00 %    Lymphocytes 26.90 22.00 - 41.00 %    Monocytes 9.40 0.00 - 13.40 %    Eosinophils 1.90 0.00 - 6.90 %    Basophils 0.90 0.00 - 1.80 %    Immature Granulocytes 1.50 (H) 0.00 - 0.90 %    Nucleated RBC 0.00 /100 WBC    Neutrophils (Absolute) 5.66 1.82 - 7.42 K/uL    Lymphs (Absolute) 2.57 1.00 - 4.80 K/uL    Monos (Absolute) 0.90 (H) 0.00 - 0.85 K/uL    Eos (Absolute) 0.18 0.00 - 0.51 K/uL    Baso (Absolute) 0.09 0.00 - 0.12 K/uL    Immature Granulocytes (abs) 0.14 (H) 0.00 - 0.11 K/uL    NRBC (Absolute) 0.00 K/uL   COMP METABOLIC PANEL   Result Value Ref Range    Sodium 129 (L) 135 - 145 mmol/L    Potassium 4.5 3.6 - 5.5 mmol/L    Chloride 96 96 - 112 mmol/L    Co2 22 20 - 33 mmol/L    Anion Gap 11.0 0.0 - 11.9    Glucose 433 (H) 65 - 99 mg/dL    Bun 18 8 - 22 mg/dL    Creatinine 1.01 0.50 - 1.40 mg/dL    Calcium 8.3 (L) 8.5 - 10.5 mg/dL    AST(SGOT) 29 12 - 45 U/L    ALT(SGPT) 22 2 - 50 U/L    Alkaline Phosphatase 67 30 - 99 U/L    Total Bilirubin 0.5 0.1 - 1.5 mg/dL    Albumin 4.0 3.2 - 4.9 g/dL    Total Protein 6.4 6.0 - 8.2 g/dL    Globulin 2.4 1.9 - 3.5 g/dL    A-G Ratio 1.7 g/dL   ESTIMATED GFR   Result Value Ref Range    GFR If African American >60 >60 mL/min/1.73 m 2    GFR If Non African American >60 >60 mL/min/1.73 m 2   BETA-HYDROXYBUTYRIC ACID   Result Value Ref Range    beta-Hydroxybutyric Acid 1.35 (H) 0.02 - 0.27 mmol/L   FREE THYROXINE   Result Value Ref Range    Free T-4 0.78 0.53 - 1.43 ng/dL   TSH   Result Value Ref Range    TSH 17.690 (H) 0.380 - 5.330 uIU/mL   MAGNESIUM   Result Value Ref Range    Magnesium 1.9 1.5 - 2.5 mg/dL   PHOSPHORUS   Result Value Ref Range    Phosphorus 2.3 (L) 2.5 - 4.5 mg/dL   LIPASE   Result Value Ref Range    Lipase 35 11 - 82 U/L   ACCU-CHEK GLUCOSE   Result Value Ref Range    Glucose - Accu-Ck 470 (HH) 65 - 99 mg/dL        Hospital assessment, case  discussed. The patient stable on admission for evaluation of the persistent hyperglycemia and the abnormal thyroid studies and the hyponatremia.  FINAL IMPRESSION  1. Diabetes mellitus with hyperglycemia  2. Thyromegaly abnormal thyroid studies.  3. Hyponatremia         Electronically signed by: Gary Gansert, 2/1/2018 6pm.

## 2018-02-03 LAB
ANION GAP SERPL CALC-SCNC: 9 MMOL/L (ref 0–11.9)
APTT PPP: 26.7 SEC (ref 24.7–36)
B-OH-BUTYR SERPL-MCNC: 0.22 MMOL/L (ref 0.02–0.27)
BASOPHILS # BLD AUTO: 0.9 % (ref 0–1.8)
BASOPHILS # BLD: 0.07 K/UL (ref 0–0.12)
BUN SERPL-MCNC: 9 MG/DL (ref 8–22)
CALCIUM SERPL-MCNC: 8 MG/DL (ref 8.5–10.5)
CHLORIDE SERPL-SCNC: 103 MMOL/L (ref 96–112)
CO2 SERPL-SCNC: 24 MMOL/L (ref 20–33)
CREAT SERPL-MCNC: 0.69 MG/DL (ref 0.5–1.4)
EOSINOPHIL # BLD AUTO: 0.18 K/UL (ref 0–0.51)
EOSINOPHIL NFR BLD: 2.3 % (ref 0–6.9)
ERYTHROCYTE [DISTWIDTH] IN BLOOD BY AUTOMATED COUNT: 44.7 FL (ref 35.9–50)
GLUCOSE BLD-MCNC: 255 MG/DL (ref 65–99)
GLUCOSE BLD-MCNC: 274 MG/DL (ref 65–99)
GLUCOSE BLD-MCNC: 335 MG/DL (ref 65–99)
GLUCOSE BLD-MCNC: 343 MG/DL (ref 65–99)
GLUCOSE SERPL-MCNC: 260 MG/DL (ref 65–99)
HCT VFR BLD AUTO: 35.6 % (ref 42–52)
HGB BLD-MCNC: 12.1 G/DL (ref 14–18)
IMM GRANULOCYTES # BLD AUTO: 0.11 K/UL (ref 0–0.11)
IMM GRANULOCYTES NFR BLD AUTO: 1.4 % (ref 0–0.9)
INR PPP: 1.02 (ref 0.87–1.13)
LYMPHOCYTES # BLD AUTO: 2.55 K/UL (ref 1–4.8)
LYMPHOCYTES NFR BLD: 32.7 % (ref 22–41)
MAGNESIUM SERPL-MCNC: 1.7 MG/DL (ref 1.5–2.5)
MCH RBC QN AUTO: 31.2 PG (ref 27–33)
MCHC RBC AUTO-ENTMCNC: 34 G/DL (ref 33.7–35.3)
MCV RBC AUTO: 91.8 FL (ref 81.4–97.8)
MONOCYTES # BLD AUTO: 0.78 K/UL (ref 0–0.85)
MONOCYTES NFR BLD AUTO: 10 % (ref 0–13.4)
NEUTROPHILS # BLD AUTO: 4.12 K/UL (ref 1.82–7.42)
NEUTROPHILS NFR BLD: 52.7 % (ref 44–72)
NRBC # BLD AUTO: 0 K/UL
NRBC BLD-RTO: 0 /100 WBC
PLATELET # BLD AUTO: 151 K/UL (ref 164–446)
PMV BLD AUTO: 10.2 FL (ref 9–12.9)
POTASSIUM SERPL-SCNC: 3.2 MMOL/L (ref 3.6–5.5)
PROTHROMBIN TIME: 13.1 SEC (ref 12–14.6)
RBC # BLD AUTO: 3.88 M/UL (ref 4.7–6.1)
SODIUM SERPL-SCNC: 136 MMOL/L (ref 135–145)
WBC # BLD AUTO: 7.8 K/UL (ref 4.8–10.8)

## 2018-02-03 PROCEDURE — 80048 BASIC METABOLIC PNL TOTAL CA: CPT

## 2018-02-03 PROCEDURE — 700102 HCHG RX REV CODE 250 W/ 637 OVERRIDE(OP): Performed by: HOSPITALIST

## 2018-02-03 PROCEDURE — 770006 HCHG ROOM/CARE - MED/SURG/GYN SEMI*

## 2018-02-03 PROCEDURE — 85025 COMPLETE CBC W/AUTO DIFF WBC: CPT

## 2018-02-03 PROCEDURE — 83735 ASSAY OF MAGNESIUM: CPT

## 2018-02-03 PROCEDURE — 36415 COLL VENOUS BLD VENIPUNCTURE: CPT

## 2018-02-03 PROCEDURE — 85610 PROTHROMBIN TIME: CPT

## 2018-02-03 PROCEDURE — 85730 THROMBOPLASTIN TIME PARTIAL: CPT

## 2018-02-03 PROCEDURE — A9270 NON-COVERED ITEM OR SERVICE: HCPCS | Performed by: HOSPITALIST

## 2018-02-03 PROCEDURE — 700105 HCHG RX REV CODE 258: Performed by: HOSPITALIST

## 2018-02-03 PROCEDURE — 82010 KETONE BODYS QUAN: CPT

## 2018-02-03 PROCEDURE — 99233 SBSQ HOSP IP/OBS HIGH 50: CPT | Performed by: HOSPITALIST

## 2018-02-03 PROCEDURE — 82962 GLUCOSE BLOOD TEST: CPT | Mod: 91

## 2018-02-03 RX ORDER — GLIPIZIDE 5 MG/1
5 TABLET ORAL
Status: DISCONTINUED | OUTPATIENT
Start: 2018-02-03 | End: 2018-02-04

## 2018-02-03 RX ORDER — POTASSIUM CHLORIDE 20 MEQ/1
40 TABLET, EXTENDED RELEASE ORAL 2 TIMES DAILY
Status: DISCONTINUED | OUTPATIENT
Start: 2018-02-03 | End: 2018-02-04

## 2018-02-03 RX ORDER — INSULIN GLARGINE 100 [IU]/ML
10 INJECTION, SOLUTION SUBCUTANEOUS EVERY EVENING
Status: DISCONTINUED | OUTPATIENT
Start: 2018-02-03 | End: 2018-02-04

## 2018-02-03 RX ADMIN — INSULIN HUMAN 6 UNITS: 100 INJECTION, SOLUTION PARENTERAL at 16:51

## 2018-02-03 RX ADMIN — INSULIN GLARGINE 10 UNITS: 100 INJECTION, SOLUTION SUBCUTANEOUS at 20:30

## 2018-02-03 RX ADMIN — DIVALPROEX SODIUM 1000 MG: 250 TABLET, DELAYED RELEASE ORAL at 08:24

## 2018-02-03 RX ADMIN — LEVOTHYROXINE SODIUM 125 MCG: 25 TABLET ORAL at 05:57

## 2018-02-03 RX ADMIN — DIVALPROEX SODIUM 1000 MG: 250 TABLET, DELAYED RELEASE ORAL at 20:30

## 2018-02-03 RX ADMIN — METFORMIN HYDROCHLORIDE 500 MG: 500 TABLET, FILM COATED ORAL at 16:52

## 2018-02-03 RX ADMIN — POTASSIUM CHLORIDE 40 MEQ: 1500 TABLET, EXTENDED RELEASE ORAL at 20:30

## 2018-02-03 RX ADMIN — INSULIN HUMAN 5 UNITS: 100 INJECTION, SOLUTION PARENTERAL at 05:51

## 2018-02-03 RX ADMIN — INSULIN HUMAN 10 UNITS: 100 INJECTION, SUSPENSION SUBCUTANEOUS at 05:52

## 2018-02-03 RX ADMIN — POTASSIUM CHLORIDE 40 MEQ: 1500 TABLET, EXTENDED RELEASE ORAL at 10:10

## 2018-02-03 RX ADMIN — SERTRALINE 100 MG: 100 TABLET, FILM COATED ORAL at 08:24

## 2018-02-03 RX ADMIN — SODIUM CHLORIDE: 9 INJECTION, SOLUTION INTRAVENOUS at 01:24

## 2018-02-03 RX ADMIN — INSULIN HUMAN 5 UNITS: 100 INJECTION, SOLUTION PARENTERAL at 11:22

## 2018-02-03 RX ADMIN — LATANOPROST 1 DROP: 50 SOLUTION OPHTHALMIC at 20:30

## 2018-02-03 ASSESSMENT — ENCOUNTER SYMPTOMS
CHILLS: 0
WHEEZING: 0
WEAKNESS: 0
HEMOPTYSIS: 0
SPEECH CHANGE: 0
SPUTUM PRODUCTION: 0
NAUSEA: 0
CLAUDICATION: 0
EYE DISCHARGE: 0
FOCAL WEAKNESS: 0
EYE PAIN: 0
LOSS OF CONSCIOUSNESS: 0
BRUISES/BLEEDS EASILY: 0
ABDOMINAL PAIN: 0
DIZZINESS: 0
CONSTIPATION: 0
DIAPHORESIS: 0
SHORTNESS OF BREATH: 0
BACK PAIN: 0
SORE THROAT: 0
MYALGIAS: 1
SENSORY CHANGE: 0
DIARRHEA: 0
VOMITING: 0
PALPITATIONS: 0
HEADACHES: 0
DEPRESSION: 0
FEVER: 0
COUGH: 0
NECK PAIN: 0

## 2018-02-03 ASSESSMENT — PAIN SCALES - GENERAL
PAINLEVEL_OUTOF10: 0
PAINLEVEL_OUTOF10: 0

## 2018-02-03 ASSESSMENT — LIFESTYLE VARIABLES: SUBSTANCE_ABUSE: 0

## 2018-02-03 NOTE — ASSESSMENT & PLAN NOTE
Ordered CT soft tissue w/ and w/o arising within rt thyroid lobe 3.5 cm  H/o goiter  Needs NM thyroid scan as outpatient.

## 2018-02-03 NOTE — ASSESSMENT & PLAN NOTE
a1c 12.3%  Polydipsia, polyuria symptoms.  Diabetic diet, education, SSI  2/4:  Blood sugars remain elevated despite 3 diabetic meds and diet change.  Continue to optimize medications to achieve control.

## 2018-02-03 NOTE — ASSESSMENT & PLAN NOTE
TSH 12, free thyroid low, on 125mcg daily synthroid  U/s mass vascular mass just above thyroid  2/2 CT soft tissue neck w and w/o contrast:  3.5cm mass arising Within rt thyroid lobe.  H/o goiter, will need outpatient NM thyroid scan.

## 2018-02-03 NOTE — CONSULTS
Diabetes Education:  Patient with new onset type 2 diabetes, HbA1c= 12.3%.      Discussed basic physiology of diabetes, nutrition, exercise. oral medications, brief discussion of insulin, illness/injury, hormones, FSBG testing, preventing and treating hypoglycemia, preventing complications.  Supplied True Metrix glucometer and instructed in use.  Written information provided.      Recommend starting metformin 500 mg twice daily and glimepiride 4 mg.  Patient reports he has been drinking several Monsters daily.  I suspect once he is no longer glucose toxic, he will be able to control FSBG without insulin.  He follows with a PCP frequently for bipolar medications.    Upon discharge, patient will need a prescription for True Test strips, lancets, metformin and glimepiride.  If insulin is required upon discharge, please call [phone] for insulin instruction

## 2018-02-03 NOTE — CARE PLAN
Problem: Safety  Goal: Will remain free from falls  Outcome: PROGRESSING AS EXPECTED  No fall has occurred. Pt has gotten up with no assistive device with a steady gait. Pt knows to use the call light and to mobilize with a staff member.    Problem: Venous Thromboembolism (VTW)/Deep Vein Thrombosis (DVT) Prevention:  Goal: Patient will participate in Venous Thrombosis (VTE)/Deep Vein Thrombosis (DVT)Prevention Measures  Outcome: PROGRESSING AS EXPECTED  No s/s of DVT. Pt walks occasionally. Pt moves around in bed.

## 2018-02-03 NOTE — PROGRESS NOTES
Spoke with , Ayah from [facility]. Ayah states pt lives at group home, [facility], and has lived there for 3 years. States she can also setup more diabetic education as needed as well once discharged.

## 2018-02-03 NOTE — CARE PLAN
Problem: Safety  Goal: Will remain free from injury  Patient has history of seizures, patient reports last seizure was 3 weeks ago, patient stated flashing lights are a trigger for his seizures, patient states he normally starts to shake prior to seizure and usually able to call for assistance prior to one. Padded rails and placed suction at bedside for seizure precautions.     Problem: Knowledge Deficit  Goal: Knowledge of disease process/condition, treatment plan, diagnostic tests, and medications will improve    Intervention: Assess knowledge level of disease process/condition, treatment plan, diagnostic tests, and medications  Patient self demonstrated how to obtain blood sugar with minimal assistance, patient received diabetes education, discussed new prescribed medications of glipizide and metformin, patient verbalized understanding.

## 2018-02-04 ENCOUNTER — HOME HEALTH ADMISSION (OUTPATIENT)
Dept: HOME HEALTH SERVICES | Facility: HOME HEALTHCARE | Age: 38
End: 2018-02-04
Payer: MEDICAID

## 2018-02-04 PROBLEM — L84 PRE-ULCERATIVE CORN OR CALLOUS: Status: ACTIVE | Noted: 2018-02-04

## 2018-02-04 LAB
ANION GAP SERPL CALC-SCNC: 9 MMOL/L (ref 0–11.9)
BUN SERPL-MCNC: 8 MG/DL (ref 8–22)
CALCIUM SERPL-MCNC: 8.2 MG/DL (ref 8.5–10.5)
CHLORIDE SERPL-SCNC: 99 MMOL/L (ref 96–112)
CO2 SERPL-SCNC: 25 MMOL/L (ref 20–33)
CREAT SERPL-MCNC: 0.86 MG/DL (ref 0.5–1.4)
GLUCOSE BLD-MCNC: 181 MG/DL (ref 65–99)
GLUCOSE BLD-MCNC: 194 MG/DL (ref 65–99)
GLUCOSE BLD-MCNC: 375 MG/DL (ref 65–99)
GLUCOSE BLD-MCNC: 376 MG/DL (ref 65–99)
GLUCOSE SERPL-MCNC: 396 MG/DL (ref 65–99)
PHOSPHATE SERPL-MCNC: 2.4 MG/DL (ref 2.5–4.5)
POTASSIUM SERPL-SCNC: 3.9 MMOL/L (ref 3.6–5.5)
SODIUM SERPL-SCNC: 133 MMOL/L (ref 135–145)

## 2018-02-04 PROCEDURE — 82962 GLUCOSE BLOOD TEST: CPT

## 2018-02-04 PROCEDURE — 99233 SBSQ HOSP IP/OBS HIGH 50: CPT | Performed by: HOSPITALIST

## 2018-02-04 PROCEDURE — 700102 HCHG RX REV CODE 250 W/ 637 OVERRIDE(OP): Performed by: HOSPITALIST

## 2018-02-04 PROCEDURE — 80048 BASIC METABOLIC PNL TOTAL CA: CPT

## 2018-02-04 PROCEDURE — A9270 NON-COVERED ITEM OR SERVICE: HCPCS | Performed by: HOSPITALIST

## 2018-02-04 PROCEDURE — 770006 HCHG ROOM/CARE - MED/SURG/GYN SEMI*

## 2018-02-04 PROCEDURE — 84100 ASSAY OF PHOSPHORUS: CPT

## 2018-02-04 PROCEDURE — 36415 COLL VENOUS BLD VENIPUNCTURE: CPT

## 2018-02-04 RX ORDER — INSULIN GLARGINE 100 [IU]/ML
15 INJECTION, SOLUTION SUBCUTANEOUS EVERY EVENING
Status: DISCONTINUED | OUTPATIENT
Start: 2018-02-04 | End: 2018-02-05 | Stop reason: HOSPADM

## 2018-02-04 RX ORDER — GLIPIZIDE 5 MG/1
10 TABLET ORAL
Status: DISCONTINUED | OUTPATIENT
Start: 2018-02-05 | End: 2018-02-05 | Stop reason: HOSPADM

## 2018-02-04 RX ADMIN — DIVALPROEX SODIUM 1000 MG: 250 TABLET, DELAYED RELEASE ORAL at 08:17

## 2018-02-04 RX ADMIN — METFORMIN HYDROCHLORIDE 1000 MG: 500 TABLET, FILM COATED ORAL at 16:32

## 2018-02-04 RX ADMIN — LATANOPROST 1 DROP: 50 SOLUTION OPHTHALMIC at 20:37

## 2018-02-04 RX ADMIN — INSULIN GLARGINE 15 UNITS: 100 INJECTION, SOLUTION SUBCUTANEOUS at 20:37

## 2018-02-04 RX ADMIN — INSULIN HUMAN 2 UNITS: 100 INJECTION, SOLUTION PARENTERAL at 11:24

## 2018-02-04 RX ADMIN — DIBASIC SODIUM PHOSPHATE, MONOBASIC POTASSIUM PHOSPHATE AND MONOBASIC SODIUM PHOSPHATE 1 TABLET: 852; 155; 130 TABLET ORAL at 16:32

## 2018-02-04 RX ADMIN — GLIPIZIDE 5 MG: 5 TABLET ORAL at 05:26

## 2018-02-04 RX ADMIN — POTASSIUM CHLORIDE 40 MEQ: 1500 TABLET, EXTENDED RELEASE ORAL at 08:17

## 2018-02-04 RX ADMIN — INSULIN HUMAN 8 UNITS: 100 INJECTION, SOLUTION PARENTERAL at 00:22

## 2018-02-04 RX ADMIN — INSULIN HUMAN 3 UNITS: 100 INJECTION, SOLUTION PARENTERAL at 23:59

## 2018-02-04 RX ADMIN — INSULIN HUMAN 2 UNITS: 100 INJECTION, SOLUTION PARENTERAL at 16:33

## 2018-02-04 RX ADMIN — DIVALPROEX SODIUM 1000 MG: 250 TABLET, DELAYED RELEASE ORAL at 20:37

## 2018-02-04 RX ADMIN — METFORMIN HYDROCHLORIDE 1000 MG: 500 TABLET, FILM COATED ORAL at 08:17

## 2018-02-04 RX ADMIN — INSULIN HUMAN 8 UNITS: 100 INJECTION, SOLUTION PARENTERAL at 05:26

## 2018-02-04 RX ADMIN — SERTRALINE 100 MG: 100 TABLET, FILM COATED ORAL at 08:17

## 2018-02-04 RX ADMIN — LEVOTHYROXINE SODIUM 125 MCG: 25 TABLET ORAL at 05:26

## 2018-02-04 ASSESSMENT — ENCOUNTER SYMPTOMS
PALPITATIONS: 0
SORE THROAT: 0
DIAPHORESIS: 0
EYE PAIN: 0
HEMOPTYSIS: 0
ABDOMINAL PAIN: 0
EYE DISCHARGE: 0
BACK PAIN: 0
SHORTNESS OF BREATH: 0
CONSTIPATION: 0
WEAKNESS: 0
NECK PAIN: 0
FEVER: 0
SENSORY CHANGE: 0
SPUTUM PRODUCTION: 0
FOCAL WEAKNESS: 0
DEPRESSION: 0
BRUISES/BLEEDS EASILY: 0
SPEECH CHANGE: 0
MYALGIAS: 1
NAUSEA: 0
DIZZINESS: 0
LOSS OF CONSCIOUSNESS: 0
HEADACHES: 0
CHILLS: 0
CLAUDICATION: 0
WHEEZING: 0
COUGH: 0
DIARRHEA: 0
VOMITING: 0

## 2018-02-04 ASSESSMENT — PATIENT HEALTH QUESTIONNAIRE - PHQ9
2. FEELING DOWN, DEPRESSED, IRRITABLE, OR HOPELESS: SEVERAL DAYS
5. POOR APPETITE OR OVEREATING: SEVERAL DAYS
8. MOVING OR SPEAKING SO SLOWLY THAT OTHER PEOPLE COULD HAVE NOTICED. OR THE OPPOSITE, BEING SO FIGETY OR RESTLESS THAT YOU HAVE BEEN MOVING AROUND A LOT MORE THAN USUAL: SEVERAL DAYS
1. LITTLE INTEREST OR PLEASURE IN DOING THINGS: NOT AT ALL
6. FEELING BAD ABOUT YOURSELF - OR THAT YOU ARE A FAILURE OR HAVE LET YOURSELF OR YOUR FAMILY DOWN: SEVERAL DAYS
4. FEELING TIRED OR HAVING LITTLE ENERGY: SEVERAL DAYS
7. TROUBLE CONCENTRATING ON THINGS, SUCH AS READING THE NEWSPAPER OR WATCHING TELEVISION: SEVERAL DAYS
9. THOUGHTS THAT YOU WOULD BE BETTER OFF DEAD, OR OF HURTING YOURSELF: NOT AT ALL
SUM OF ALL RESPONSES TO PHQ QUESTIONS 1-9: 7
SUM OF ALL RESPONSES TO PHQ9 QUESTIONS 1 AND 2: 1
3. TROUBLE FALLING OR STAYING ASLEEP OR SLEEPING TOO MUCH: SEVERAL DAYS

## 2018-02-04 ASSESSMENT — LIFESTYLE VARIABLES: SUBSTANCE_ABUSE: 0

## 2018-02-04 ASSESSMENT — PAIN SCALES - GENERAL
PAINLEVEL_OUTOF10: 0
PAINLEVEL_OUTOF10: 0

## 2018-02-04 NOTE — DISCHARGE PLANNING
Received choice form from [facility] for HH. Referral sent to [facility] at 1132.

## 2018-02-04 NOTE — PROGRESS NOTES
[facility] Hospitalist Progress Note    Date of Service: 2/3/2018    Chief Complaint  37 y.o. male admitted 2/1/2018 with history of bipolar disorder, glaucoma left eye blindness, conversion disorder, goiter removal 1998 with new onset uncontrolled blood sugars and increasing neck mass and swelling    Interval Problem Update  2/2:  Polydipsia and polyuria for weeks prior to admission.  U/s no thyroid mass, vascular.  Ordered CT soft tissue with and w/o contrast to eval.  Diabetic educator, placed on diabetic diet.  Hg a1c 12.3%.  IVFs 150/hr.  2/3:  Added oral diabetic meds:  glucophage 500mg bid with continued elevated BS 300s, increase in a.m. To 1000mg bid, glucotrol 5mg daily, changed humolog NPH to lantus 10 units qhs.      Consultants/Specialty  Diabetic educator:  Thinks that diet and oral diabetic meds will suffice, blood sugars remain elevated.  Believe patient will need lantus 10 units qhs to control in addition to po meds.    Disposition  Has glaucoma blindness left eye.  Has  who states patient lives in group [place], [facility].  HH ordered for diabetic education, dietician, home aide.  Has had HH in past.        Review of Systems   Constitutional: Positive for malaise/fatigue. Negative for chills, diaphoresis and fever.   HENT: Negative for congestion and sore throat.    Eyes: Negative for pain and discharge.   Respiratory: Negative for cough, hemoptysis, sputum production, shortness of breath and wheezing.    Cardiovascular: Negative for chest pain, palpitations and claudication.   Gastrointestinal: Negative for abdominal pain, constipation, diarrhea, melena, nausea and vomiting.   Genitourinary: Positive for frequency. Negative for dysuria and urgency.   Musculoskeletal: Positive for myalgias. Negative for back pain, joint pain and neck pain.   Skin: Negative for itching and rash.   Neurological: Negative for dizziness, sensory change, speech change, focal weakness, loss of consciousness,  weakness and headaches.   Endo/Heme/Allergies: Does not bruise/bleed easily.   Psychiatric/Behavioral: Negative for depression, substance abuse and suicidal ideas.      Physical Exam  Laboratory/Imaging   Hemodynamics  Temp (24hrs), Av.3 °C (97.3 °F), Min:35.8 °C (96.4 °F), Max:36.8 °C (98.2 °F)   Temperature: 36.8 °C (98.2 °F)  Pulse  Av.9  Min: 50  Max: 85   Blood Pressure: 131/87      Respiratory      Respiration: 17, Pulse Oximetry: 95 %             Fluids    Intake/Output Summary (Last 24 hours) at 18 193  Last data filed at 18 1800   Gross per 24 hour   Intake             2700 ml   Output             2450 ml   Net              250 ml       Nutrition  Orders Placed This Encounter   Procedures   • Diet Order     Standing Status:   Standing     Number of Occurrences:   1     Order Specific Question:   Diet:     Answer:   Diabetic [3]     Physical Exam   Constitutional: He is oriented to person, place, and time. He appears well-developed and well-nourished. No distress.   HENT:   Head: Normocephalic and atraumatic.   Mouth/Throat: Oropharynx is clear and moist. No oropharyngeal exudate.   Well hydrated.   Eyes: Conjunctivae and EOM are normal. Right eye exhibits no discharge. Left eye exhibits no discharge. No scleral icterus.   Left eye proptosis, blindness 2/2 glaucoma   Neck: Normal range of motion. Neck supple. No JVD present. No tracheal deviation present. No thyromegaly present.   Enlargement nontender anterior neck, anterior to thyroid gland.   Cardiovascular: Normal rate, regular rhythm and normal heart sounds.  Exam reveals no gallop and no friction rub.    No murmur heard.  Pulmonary/Chest: Effort normal and breath sounds normal. No respiratory distress. He has no wheezes. He has no rales. He exhibits no tenderness.   Abdominal: Soft. Bowel sounds are normal. He exhibits no distension and no mass. There is no tenderness. There is no rebound and no guarding.   Musculoskeletal: Normal  range of motion. He exhibits no edema or tenderness.   Lymphadenopathy:     He has no cervical adenopathy.   Neurological: He is alert and oriented to person, place, and time. No cranial nerve deficit. He exhibits normal muscle tone.   Skin: Skin is warm and dry. No rash noted. He is not diaphoretic. No erythema.   Psychiatric: He has a normal mood and affect. His behavior is normal. Judgment and thought content normal.   Nursing note and vitals reviewed.      Recent Labs      02/01/18   1309  02/03/18   0412   WBC  9.5  7.8   RBC  4.17*  3.88*   HEMOGLOBIN  13.7*  12.1*   HEMATOCRIT  38.8*  35.6*   MCV  93.0  91.8   MCH  32.9  31.2   MCHC  35.3  34.0   RDW  45.2  44.7   PLATELETCT  205  151*   MPV  10.3  10.2     Recent Labs      02/01/18   1309  02/03/18   0412   SODIUM  129*  136   POTASSIUM  4.5  3.2*   CHLORIDE  96  103   CO2  22  24   GLUCOSE  433*  260*   BUN  18  9   CREATININE  1.01  0.69   CALCIUM  8.3*  8.0*     Recent Labs      02/03/18   0412   APTT  26.7   INR  1.02                  Assessment/Plan     * Hyperosmolar non-ketotic state in patient with type 2 diabetes mellitus (CMS-HCC)- (present on admission)   Assessment & Plan    2/1 elevated BHBA, repeat negative  Bicarb normal, normal AG  Hyperglycemia 450s  Aggressive IVFs, continue NS 150hr  Ketotic breath on exam resolved on 2/3.  New onset diabetes   2/2 changed to diabetic diet, SSI  Diabetic education  Replace electrolytes.  2/3 added glucophage 1000mg po bid, glucotrol 5mg daily and lantus 10 units qhs.  Will need 3 drug regiment since a1c 12.3%.    Dc'd IVFs since rehydrated.  Replace K, low 3.2, mag good, check phosphorus in a.m.        Bipolar disorder (CMS-HCC)- (present on admission)   Assessment & Plan    Continue home meds.        Hypothyroidism- (present on admission)   Assessment & Plan    TSH 12, free thyroid low, on 125mcg daily synthroid  U/s mass vascular mass just above thyroid  2/2 CT soft tissue neck w and w/o contrast:  3.5cm  mass arising Within rt thyroid lobe.  H/o goiter, will need outpatient NM thyroid scan.          New onset type 2 diabetes mellitus (CMS-HCC)   Assessment & Plan    a1c 12.3%  Polydipsia, polyuria  Diabetic diet, education, SSI        Neck mass   Assessment & Plan    Ordered CT soft tissue w/ and w/o arising within rt thyroid lobe 3.5 cm  H/o goiter  Needs NM thyroid scan as outpatient.        Hyperglycemia- (present on admission)   Assessment & Plan    Hyperglycemia remains.          Glaucoma- (present on admission)   Assessment & Plan    Resulting in left eye blindness and proptosis.          Quality-Core Measures

## 2018-02-04 NOTE — DISCHARGE PLANNING
Medical Social Work    Per pts chart, pt needs a HH referral. SW met with pt at bedside who requested that referrals be sent to [facility]. SW addressed all questions and encouraged follow up as needed. SW sent the choice form to [facility] and confirmed receipt of choice form. SW to monitor response status and follow up with care team.    Pt is living at [facility] call Peri at [phone].

## 2018-02-04 NOTE — PROGRESS NOTES
[facility] Hospitalist Progress Note    Date of Service: 2/4/2018    Chief Complaint  37 y.o. male admitted 2/1/2018 with history of bipolar disorder, glaucoma left eye blindness, conversion disorder, goiter removal 1998 with new onset uncontrolled blood sugars and increasing neck mass and swelling    Interval Problem Update  2/2:  Polydipsia and polyuria for weeks prior to admission.  U/s no thyroid mass, vascular.  Ordered CT soft tissue with and w/o contrast to eval.  Diabetic educator, placed on diabetic diet.  Hg a1c 12.3%.  IVFs 150/hr.  2/3:  Added oral diabetic meds:  glucophage 500mg bid with continued elevated BS 300s, increase in a.m. To 1000mg bid, glucotrol 5mg daily, changed humolog NPH to lantus 10 units qhs.    2/4:  Blood sugars remain elevated 300s despite diabetic diet, lantus, glucophage, glucotrol.  Continue to increase meds to achieve lower blood sugars.  Noted thick hardened callouses both feet, recommend podiatry outpatient.  Will see if wound care can remove prior to dc since unlikely to get to see podiatrist.    Consultants/Specialty  Diabetic educator:  Thinks that diet and oral diabetic meds will suffice, blood sugars remain elevated.  Believe patient will need lantus 10 units qhs to control in addition to po meds.    Disposition  Has glaucoma blindness left eye.  Has  who states patient lives in group [facility].  2/3: HH ordered for diabetic education, dietician, home aide.  Has had HH in past.        Review of Systems   Constitutional: Positive for malaise/fatigue. Negative for chills, diaphoresis and fever.   HENT: Negative for congestion and sore throat.    Eyes: Negative for pain and discharge.   Respiratory: Negative for cough, hemoptysis, sputum production, shortness of breath and wheezing.    Cardiovascular: Negative for chest pain, palpitations and claudication.   Gastrointestinal: Negative for abdominal pain, constipation, diarrhea, melena, nausea and vomiting.    Genitourinary: Positive for frequency. Negative for dysuria and urgency.   Musculoskeletal: Positive for myalgias. Negative for back pain, joint pain and neck pain.   Skin: Negative for itching and rash.   Neurological: Negative for dizziness, sensory change, speech change, focal weakness, loss of consciousness, weakness and headaches.   Endo/Heme/Allergies: Does not bruise/bleed easily.   Psychiatric/Behavioral: Negative for depression, substance abuse and suicidal ideas.      Physical Exam  Laboratory/Imaging   Hemodynamics  Temp (24hrs), Av.7 °C (98.1 °F), Min:36.2 °C (97.1 °F), Max:37.3 °C (99.2 °F)   Temperature: 36.2 °C (97.1 °F)  Pulse  Av.6  Min: 50  Max: 85   Blood Pressure: 118/80      Respiratory      Respiration: 17, Pulse Oximetry: 95 %             Fluids    Intake/Output Summary (Last 24 hours) at 18 1536  Last data filed at 18 1016   Gross per 24 hour   Intake              775 ml   Output             3100 ml   Net            -2325 ml       Nutrition  Orders Placed This Encounter   Procedures   • Diet Order     Standing Status:   Standing     Number of Occurrences:   1     Order Specific Question:   Diet:     Answer:   Diabetic [3]     Physical Exam   Constitutional: He is oriented to person, place, and time. He appears well-developed and well-nourished. No distress.   HENT:   Head: Normocephalic and atraumatic.   Mouth/Throat: Oropharynx is clear and moist. No oropharyngeal exudate.   Well hydrated.   Eyes: Conjunctivae and EOM are normal. Right eye exhibits no discharge. Left eye exhibits no discharge. No scleral icterus.   Left eye proptosis, blindness 2/2 glaucoma   Neck: Normal range of motion. Neck supple. No JVD present. No tracheal deviation present. No thyromegaly present.   Enlargement nontender anterior neck, anterior to thyroid gland.   Cardiovascular: Normal rate, regular rhythm and normal heart sounds.  Exam reveals no gallop and no friction rub.    No murmur  heard.  Pulmonary/Chest: Effort normal and breath sounds normal. No respiratory distress. He has no wheezes. He has no rales. He exhibits no tenderness.   Abdominal: Soft. Bowel sounds are normal. He exhibits no distension and no mass. There is no tenderness. There is no rebound and no guarding.   Musculoskeletal: Normal range of motion. He exhibits no edema or tenderness.   Lymphadenopathy:     He has no cervical adenopathy.   Neurological: He is alert and oriented to person, place, and time. No cranial nerve deficit. He exhibits normal muscle tone.   Skin: Skin is warm and dry. No rash noted. He is not diaphoretic. No erythema.   Psychiatric: He has a normal mood and affect. His behavior is normal. Judgment and thought content normal.   Nursing note and vitals reviewed.      Recent Labs      02/03/18   0412   WBC  7.8   RBC  3.88*   HEMOGLOBIN  12.1*   HEMATOCRIT  35.6*   MCV  91.8   MCH  31.2   MCHC  34.0   RDW  44.7   PLATELETCT  151*   MPV  10.2     Recent Labs      02/03/18   0412  02/04/18   0215   SODIUM  136  133*   POTASSIUM  3.2*  3.9   CHLORIDE  103  99   CO2  24  25   GLUCOSE  260*  396*   BUN  9  8   CREATININE  0.69  0.86   CALCIUM  8.0*  8.2*     Recent Labs      02/03/18   0412   APTT  26.7   INR  1.02                  Assessment/Plan     * Hyperosmolar non-ketotic state in patient with type 2 diabetes mellitus (CMS-HCC)- (present on admission)   Assessment & Plan    2/1 elevated BHBA, repeat negative  Bicarb normal, normal AG  Hyperglycemia 450s  Aggressive IVFs, continue NS 150hr  Ketotic breath on exam resolved on 2/3.  New onset diabetes   2/2 changed to diabetic diet, SSI  Diabetic education  Replace electrolytes.  2/3 added glucophage 1000mg po bid, glucotrol 5mg daily and lantus 10 units qhs.  Will need 3 drug regiment since a1c 12.3%.    Dc'd IVFs since rehydrated.  Replace K, low 3.2, mag good, check phosphorus in a.m.  2/4:  Electrolytes replaced.  Resolved hyperosmolar state.         Bipolar disorder (CMS-HCC)- (present on admission)   Assessment & Plan    Continue home meds.        Hypothyroidism- (present on admission)   Assessment & Plan    TSH 12, free thyroid low, on 125mcg daily synthroid  U/s mass vascular mass just above thyroid  2/2 CT soft tissue neck w and w/o contrast:  3.5cm mass arising Within rt thyroid lobe.  H/o goiter, will need outpatient NM thyroid scan.          Pre-ulcerative corn or callous- (present on admission)   Assessment & Plan    B/l feet with thickened, hardened callouses.  Wound care consult to attempt removal since new onset diabetes.  Has medicaid and likely will be unable to see podiatrist as outpatient.        New onset type 2 diabetes mellitus (CMS-HCC)   Assessment & Plan    a1c 12.3%  Polydipsia, polyuria symptoms.  Diabetic diet, education, SSI  2/4:  Blood sugars remain elevated despite 3 diabetic meds and diet change.  Continue to optimize medications to achieve control.        Neck mass   Assessment & Plan    Ordered CT soft tissue w/ and w/o arising within rt thyroid lobe 3.5 cm  H/o goiter  Needs NM thyroid scan as outpatient.        Hyperglycemia- (present on admission)   Assessment & Plan    Hyperglycemia remains.          Glaucoma- (present on admission)   Assessment & Plan    Resulting in left eye blindness and proptosis.          Quality-Core Measures

## 2018-02-04 NOTE — DISCHARGE PLANNING
Good morning,  Thank you for sending us this referral!  However, the PCP's office is closed today so we will need to verify that the MD is willing to sign and follow Home Health Orders tomorrow 2/5/2018.  Thank you for your patience and understanding!

## 2018-02-04 NOTE — CARE PLAN
Problem: Infection  Goal: Will remain free from infection  Outcome: PROGRESSING AS EXPECTED  No s/s of infection. Hands are washed before and after entering the room.     Problem: Bowel/Gastric:  Goal: Normal bowel function is maintained or improved  Outcome: PROGRESSING SLOWER THAN EXPECTED  Pt has had 4 BM today. The stool softener last night effected him very much.

## 2018-02-04 NOTE — FACE TO FACE
Face to Face Supporting Documentation - Home Health    The encounter with this patient was in whole or in part the primary reason for home health admission.    Date of encounter:   Patient:                    MRN:                       YOB: 2016  Norm Prabhakar  [id number]  1980     Home health to see patient for:  Skilled Nursing care for assessment, interventions & education, Registered dietitian consult and Home health aide    Skilled need for:  New Onset Medical Diagnosis type 2 DM    Skilled nursing interventions to include:  Comment: new onset dm, needs diet, life style changes, glucose checks and prn insulin, lives in , has .    Homebound status evidenced by:  Needs the assistance of another person in order to leave the home. Leaving home requires a considerable and taxing effort. There is a normal inability to leave the home.    Community Physician to provide follow up care: Long Linares M.D.     Optional Interventions? No      I certify the face to face encounter for this home health care referral meets the CMS requirements and the encounter/clinical assessment with the patient was, in whole, or in part, for the medical condition(s) listed above, which is the primary reason for home health care. Based on my clinical findings: the service(s) are medically necessary, support the need for home health care, and the homebound criteria are met.  I certify that this patient has had a face to face encounter by myself.  Tamara Bronson M.D. - NPI: [id number]

## 2018-02-04 NOTE — ASSESSMENT & PLAN NOTE
B/l feet with thickened, hardened callouses.  Wound care consult to attempt removal since new onset diabetes.  Has medicaid and likely will be unable to see podiatrist as outpatient.

## 2018-02-05 VITALS
SYSTOLIC BLOOD PRESSURE: 117 MMHG | HEIGHT: 78 IN | OXYGEN SATURATION: 97 % | TEMPERATURE: 97 F | RESPIRATION RATE: 18 BRPM | HEART RATE: 71 BPM | BODY MASS INDEX: 35.35 KG/M2 | WEIGHT: 305.56 LBS | DIASTOLIC BLOOD PRESSURE: 78 MMHG

## 2018-02-05 PROBLEM — E11.00 HYPEROSMOLAR NON-KETOTIC STATE IN PATIENT WITH TYPE 2 DIABETES MELLITUS (HCC): Status: RESOLVED | Noted: 2018-02-02 | Resolved: 2018-02-05

## 2018-02-05 LAB
ANION GAP SERPL CALC-SCNC: 10 MMOL/L (ref 0–11.9)
BUN SERPL-MCNC: 9 MG/DL (ref 8–22)
CALCIUM SERPL-MCNC: 8.6 MG/DL (ref 8.5–10.5)
CHLORIDE SERPL-SCNC: 102 MMOL/L (ref 96–112)
CO2 SERPL-SCNC: 24 MMOL/L (ref 20–33)
CREAT SERPL-MCNC: 0.66 MG/DL (ref 0.5–1.4)
GLUCOSE BLD-MCNC: 207 MG/DL (ref 65–99)
GLUCOSE BLD-MCNC: 211 MG/DL (ref 65–99)
GLUCOSE BLD-MCNC: 222 MG/DL (ref 65–99)
GLUCOSE BLD-MCNC: 359 MG/DL (ref 65–99)
GLUCOSE SERPL-MCNC: 219 MG/DL (ref 65–99)
POTASSIUM SERPL-SCNC: 3.5 MMOL/L (ref 3.6–5.5)
SODIUM SERPL-SCNC: 136 MMOL/L (ref 135–145)

## 2018-02-05 PROCEDURE — 36415 COLL VENOUS BLD VENIPUNCTURE: CPT

## 2018-02-05 PROCEDURE — A9270 NON-COVERED ITEM OR SERVICE: HCPCS | Performed by: HOSPITALIST

## 2018-02-05 PROCEDURE — 700102 HCHG RX REV CODE 250 W/ 637 OVERRIDE(OP): Performed by: HOSPITALIST

## 2018-02-05 PROCEDURE — 80048 BASIC METABOLIC PNL TOTAL CA: CPT

## 2018-02-05 PROCEDURE — 99239 HOSP IP/OBS DSCHRG MGMT >30: CPT | Performed by: HOSPITALIST

## 2018-02-05 PROCEDURE — 82962 GLUCOSE BLOOD TEST: CPT

## 2018-02-05 RX ORDER — POTASSIUM CHLORIDE 20 MEQ/1
40 TABLET, EXTENDED RELEASE ORAL ONCE
Status: COMPLETED | OUTPATIENT
Start: 2018-02-05 | End: 2018-02-05

## 2018-02-05 RX ORDER — GLIMEPIRIDE 1 MG/1
1 TABLET ORAL EVERY MORNING
Qty: 30 TAB | Refills: 2 | Status: SHIPPED | OUTPATIENT
Start: 2018-02-05 | End: 2018-07-27

## 2018-02-05 RX ORDER — INSULIN GLARGINE 100 [IU]/ML
15 INJECTION, SOLUTION SUBCUTANEOUS EVERY EVENING
Qty: 10 ML | Refills: 2 | Status: SHIPPED | OUTPATIENT
Start: 2018-02-05 | End: 2018-02-13

## 2018-02-05 RX ADMIN — DIBASIC SODIUM PHOSPHATE, MONOBASIC POTASSIUM PHOSPHATE AND MONOBASIC SODIUM PHOSPHATE 1 TABLET: 852; 155; 130 TABLET ORAL at 01:11

## 2018-02-05 RX ADMIN — INSULIN HUMAN 8 UNITS: 100 INJECTION, SOLUTION PARENTERAL at 17:41

## 2018-02-05 RX ADMIN — POTASSIUM CHLORIDE 40 MEQ: 1500 TABLET, EXTENDED RELEASE ORAL at 11:14

## 2018-02-05 RX ADMIN — DIBASIC SODIUM PHOSPHATE, MONOBASIC POTASSIUM PHOSPHATE AND MONOBASIC SODIUM PHOSPHATE 1 TABLET: 852; 155; 130 TABLET ORAL at 05:54

## 2018-02-05 RX ADMIN — METFORMIN HYDROCHLORIDE 1000 MG: 500 TABLET, FILM COATED ORAL at 17:40

## 2018-02-05 RX ADMIN — LEVOTHYROXINE SODIUM 125 MCG: 25 TABLET ORAL at 05:53

## 2018-02-05 RX ADMIN — GLIPIZIDE 10 MG: 5 TABLET ORAL at 05:54

## 2018-02-05 RX ADMIN — METFORMIN HYDROCHLORIDE 1000 MG: 500 TABLET, FILM COATED ORAL at 08:08

## 2018-02-05 RX ADMIN — DIVALPROEX SODIUM 1000 MG: 250 TABLET, DELAYED RELEASE ORAL at 08:07

## 2018-02-05 RX ADMIN — DIBASIC SODIUM PHOSPHATE, MONOBASIC POTASSIUM PHOSPHATE AND MONOBASIC SODIUM PHOSPHATE 1 TABLET: 852; 155; 130 TABLET ORAL at 11:13

## 2018-02-05 RX ADMIN — INSULIN HUMAN 3 UNITS: 100 INJECTION, SOLUTION PARENTERAL at 05:54

## 2018-02-05 RX ADMIN — SERTRALINE 100 MG: 100 TABLET, FILM COATED ORAL at 08:07

## 2018-02-05 RX ADMIN — INSULIN HUMAN 3 UNITS: 100 INJECTION, SOLUTION PARENTERAL at 11:15

## 2018-02-05 RX ADMIN — DIBASIC SODIUM PHOSPHATE, MONOBASIC POTASSIUM PHOSPHATE AND MONOBASIC SODIUM PHOSPHATE 1 TABLET: 852; 155; 130 TABLET ORAL at 17:40

## 2018-02-05 NOTE — PROGRESS NOTES
Diabetes education: Consult note from April RN CDE recommends Metformin, Glimepiride for discharge. MD Recommends Lantus as well.  Plan: pt will need insulin pen instruction before discharge. Pt will need prescriptions for Basaglar kwik pen ( box of five), Olesya pen needles ( 4 mm box of 100), two oral medications ( pt currently on glucotrol not glimepiride. Glimepiride has less low blood sugar reactions and works better than glucotrol), True Metrix test strips and lancets to test at least twice a day more if MD wishes. All prescriptions need to have directions, quantity, refills and dx code (E11.65) for Medicaid to cover. CDE will teach pen. Please call [phone] if discharge close. Please have pt inject his lunch time insulin with nursing.      1451:Pt taught to use insulin pen and practiced with saline pen and practice device. Reviewed goals for blood sugars, hypoglycemia, when to call MD, need to eat three meals and hs snack with carbohydrates and proteins and why. Please see above Plan for prescriptions. Handouts given to cover areas discussed.

## 2018-02-05 NOTE — DISCHARGE INSTRUCTIONS
Discharge Instructions    Discharged to group home by medical transportation with escort. Discharged via wheelchair, hospital escort: Yes.  Special equipment needed: Not Applicable    Be sure to schedule a follow-up appointment with your primary care doctor or any specialists as instructed.     Discharge Plan:   Diet Plan: Discussed  Activity Level: Discussed  Confirmed Follow up Appointment: Appointment Scheduled  Confirmed Symptoms Management: Discussed  Medication Reconciliation Updated: Yes  Influenza Vaccine Indication: Indicated: 9 to 64 years of age  Influenza Vaccine Given - only chart on this line when given: Influenza Vaccine Given (See MAR)    I understand that a diet low in cholesterol, fat, and sodium is recommended for good health. Unless I have been given specific instructions below for another diet, I accept this instruction as my diet prescription.   Other diet: diabetic    Special Instructions: None    · Is patient discharged on Warfarin / Coumadin?   No Diabetes and Sick Day Management  Blood sugar (glucose) can be more difficult to control when you are sick. Colds, fever, flu, nausea, vomiting, and diarrhea are all examples of common illnesses that can cause problems for people with diabetes. Loss of body fluids (dehydration) from fever, vomiting, diarrhea, infection, and the stress of a sickness can all cause blood glucose levels to increase. Because of this, it is very important to take your diabetes medicines and to eat some form of carbohydrate food when you are sick. Liquid or soft foods are often tolerated, and they help to replace fluids.  HOME CARE INSTRUCTIONS  These main guidelines are intended for managing a short-term (24 hours or less) sickness:  · Take your usual dose of insulin or oral diabetes medicine. An exception would be if you take any form of metformin. If you cannot eat or drink, you can become dehydrated and should not take this medicine.  · Continue to take your insulin  even if you are unable to eat solid foods or are vomiting. Your insulin dose may stay the same, or it may need to be increased when you are sick.  · You will need to test your blood glucose more often, generally every 2-4 hours. If you have type 1 diabetes, test your urine for ketones every 4 hours. If you have type 2 diabetes, test your urine for ketones as directed by your health care provider.  · Eat some form of food that contains carbohydrates. The carbohydrates can be in solid or liquid form. You should eat 45-50 g of carbohydrates every 3-4 hours.  · Replace fluids if you have a fever, vomit, or have diarrhea. Ask your health care provider for specific rehydration instructions.  · Watch carefully for the signs of ketoacidosis if you have type 1 diabetes. Call your health care provider if any of the following symptoms are present, especially in children:  ¨ Moderate to large ketones in the urine along with a high blood glucose level.  ¨ Severe nausea.  ¨ Vomiting.  ¨ Diarrhea.  ¨ Abdominal pain.  ¨ Rapid breathing.  · Drink extra liquids that do not contain sugar such as water.  · Be careful with over-the-counter medicines. Read the labels. They may contain sugar or types of sugars that can increase your blood glucose level.  Food Choices for Illness  All of the food choices below contain about 15 g of carbohydrates. Plan ahead and keep some of these foods around.   · ½ to ¾ cup carbonated beverage containing sugar. Carbonated beverages will usually be better tolerated if they are opened and left at room temperature for a few minutes.  · ½ of a twin frozen ice pop.  · ½ cup regular gelatin.  · ½ cup juice.  · ½ cup ice cream or frozen yogurt.  · ½ cup cooked cereal.  · ¼ cup sherbet.  · 1 cup clear broth or soup.  · 1 cup cream soup.  · ½ cup regular custard.  · ½ cup regular pudding.  · 1 cup sports drink.  · 1 cup plain yogurt.  · 1 slice toast.  · 6 squares saltine crackers.  · 5 vanilla wafers.  SEEK  MEDICAL CARE IF:   · You are unable to drink fluids, even small amounts.  · You have nausea and vomiting for more than 6 hours.  · You have diarrhea for more than 6 hours.  · Your blood glucose level is more than 240 mg/dL, even with additional insulin.  · There is a change in mental status.  · You develop an additional serious sickness.  · You have been sick for 2 days and are not getting better.  · You have a fever.  SEEK IMMEDIATE MEDICAL CARE IF:  · You have difficulty breathing.  · You have moderate to large ketone levels.  MAKE SURE YOU:  · Understand these instructions.  · Will watch your condition.  · Will get help right away if you are not doing well or get worse.     This information is not intended to replace advice given to you by your health care provider. Make sure you discuss any questions you have with your health care provider.     Document Released: 12/20/2004 Document Revised: 01/08/2016 Document Reviewed: 05/27/2014  [employer] Interactive Patient Education ©2016 [employer]      Depression / Suicide Risk    As you are discharged from this [facility] facility, it is important to learn how to keep safe from harming yourself.    Recognize the warning signs:  · Abrupt changes in personality, positive or negative- including increase in energy   · Giving away possessions  · Change in eating patterns- significant weight changes-  positive or negative  · Change in sleeping patterns- unable to sleep or sleeping all the time   · Unwillingness or inability to communicate  · Depression  · Unusual sadness, discouragement and loneliness  · Talk of wanting to die  · Neglect of personal appearance   · Rebelliousness- reckless behavior  · Withdrawal from people/activities they love  · Confusion- inability to concentrate     If you or a loved one observes any of these behaviors or has concerns about self-harm, here's what you can do:  · Talk about it- your feelings and reasons for harming yourself  · Remove  any means that you might use to hurt yourself (examples: pills, rope, extension cords, firearm)  · Get professional help from the community (Mental Health, Substance Abuse, psychological counseling)  · Do not be alone:Call your Safe Contact- someone whom you trust who will be there for you.  · Call your local CRISIS HOTLINE 437-6104 or 171-467-4512  · Call your local Children's Mobile Crisis Response Team [place] (593) 280-8081 or www.[employer]  · Call the toll free National Suicide Prevention Hotlines   · National Suicide Prevention Lifeline 468-374-PMUR (0266)  · National Hope Line Network 800-SUICIDE (927-2816)

## 2018-02-05 NOTE — PROGRESS NOTES
Updated PCP yesterday 2/4/2018 in chart, called AUGUSTA Kovacs to update that PCP was updated. States since offices are still closed she will print new face sheet and get resent. Will update SW in AM as well.

## 2018-02-05 NOTE — DISCHARGE PLANNING
ATTN: Case Management  RE: Referral for Home Health                We would like to take this opportunity to thank you for submitting a referral for your patient to continue care with [facility]. Our skilled team is dedicated to helping all patients recover and gain independence in the home setting.            As of 02/05/2018, we have accepted the above patient into our service. A [facility] clinician will be out to see the patient within 48 hours to conduct our initial visit. If you have any questions or concerns regarding the patient’s transition to Home Health, please do not hesitate to contact us. We are open for referrals 7 days a week from 8AM to 5PM at [phone].      We look forward to collaborating with you,  [facility] Team

## 2018-02-05 NOTE — CARE PLAN
Problem: Communication  Goal: The ability to communicate needs accurately and effectively will improve  Outcome: PROGRESSING AS EXPECTED  Patient communicates needs and concerns with staff appropriately     Problem: Pain Management  Goal: Pain level will decrease to patient's comfort goal  Outcome: PROGRESSING AS EXPECTED  Patient does not complain of pain this shift

## 2018-02-05 NOTE — CARE PLAN
Problem: Mobility  Goal: Risk for activity intolerance will decrease    Intervention: Assess and monitor signs of activity intolerance  Pt up self tolerates well.

## 2018-02-05 NOTE — DISCHARGE SUMMARY
CHIEF COMPLAINT ON ADMISSION  Chief Complaint   Patient presents with   • Abnormal Labs     Sent by PCP, for abnormal labs.  High blood sugar,  High thyroid and high cholesterol.  States nauseated light headed.  fsbs 470 at triage.  Pt indicates his throat is getting larger from outside and has pain w/ swallow recently.  Denies hx DM.  asked to  inform staff of any changes, educated in triage.       CODE STATUS  Full Code    HPI & HOSPITAL COURSE  This is a 37 y.o. male admitted 2/1/2018 with history of bipolar disorder, glaucoma left eye blindness, conversion disorder, goiter removal 1998 with new onset uncontrolled blood sugars and increasing neck mass and swelling.  The patient is a type 2 DM with prior Hga1c 7% September 2017 with polydipsia and polyuria for weeks prior to admission.  He was placed on a diabetic diet, SSI and rehydrated with IVFs, K and phosphorus replacement.  He was started on metformin, glucotrol and lantus qhs to control his blood sugars which did respond into the 180 to low 200 range.  He was given extensive diabetic education including glucose monitoring and insulin use.  His  was called and aware of need for further education regarding his insulin use at the group home.  She stated that the group home has experience with diabetics and would be a further source of instruction.  He was ordered HH to aide in further instructions, diabetic education as well as 2 visits with our diabetic educator. He is noted to have thick callouses on both plantar surface both feet and will need podiatry consult as outpatient.  Wound care consult was ordered in hospital and were called about pending dc.  He was felt ready for dc home with all supplies including scripts for Basaglar kwik pen, box of 5, Olesya pen needles 4mm box of 100, Sulaiman Metrix test strips and lancets and True Metrix meter.  The patient's neck mass appears larger than prior scans likely a recurrence of his goiter.  His free T4 was  wnl.  To continue synthroid, recommend outpatient NM thyroid scan and referral to endocrinologist.       The patient met 2-midnight criteria for an inpatient stay at the time of discharge.    Therefore, he is discharged in good and stable condition with close outpatient follow-up.    SPECIFIC OUTPATIENT FOLLOW-UP  Follow up PCP in 4-5 days for recheck since new onset insulin dependent diabetes mellitus and set up for NM thyroid scan and referral to endocrinologist for management of goiter.    Has  RN set up for group home. Patient's case manger contacted and aware of new onset diabetes mellitus.    DISCHARGE PROBLEM LIST  Principal Problem (Resolved):    Hyperosmolar non-ketotic state in patient with type 2 diabetes mellitus (CMS-HCC) POA: Yes  Active Problems:    Hypothyroidism POA: Yes    Bipolar disorder (CMS-[facility]) POA: Yes    Glaucoma POA: Yes    Thyroid mass of unclear etiology POA: Yes    Hyperglycemia POA: Yes    New onset type 2 diabetes mellitus (CMS-[facility]) POA: Yes    Pre-ulcerative corn or callous POA: Yes      FOLLOW UP  No future appointments.  Lori Monroe, A.P.R.N.  [address] [place] [id number]  [phone]    Schedule an appointment as soon as possible for a visit in 1 week  Follow-up appointment      MEDICATIONS ON DISCHARGE   Norm Prabhakar   Home Medication Instructions WILLIAMS:[id number]    Printed on:02/05/18 1443   Medication Information                      divalproex (DEPAKOTE) 500 MG Tablet Delayed Response  Take 1,000 mg by mouth 2 Times a Day.             glimepiride (AMARYL) 1 MG tablet  Take 1 Tab by mouth every morning.             glucose 4 g 4 g Chew Tab  Take 4 Tabs by mouth as needed for Low Blood Sugar (If FSBG is less than or equal to 70 mg/dL and patient able to eat or drink).             insulin glargine (LANTUS) 100 UNIT/ML Solution  Inject 15 Units as instructed every evening.             latanoprost (XALATAN) 0.005 % Solution  Place 1 Drop in both eyes every evening.              levothyroxine (SYNTHROID) 125 MCG Tab  Take 1 Tab by mouth Every morning on an empty stomach.             metFORMIN (GLUCOPHAGE) 1000 MG tablet  Take 1 Tab by mouth 2 times a day, with meals.             sertraline (ZOLOFT) 100 MG Tab  Take 1 Tab by mouth every morning.                 DIET  Orders Placed This Encounter   Procedures   • Diet Order     Standing Status:   Standing     Number of Occurrences:   1     Order Specific Question:   Diet:     Answer:   Diabetic [3]       ACTIVITY  As tolerated.  Weight bearing as tolerated      CONSULTATIONS  2/2 and 2/5:  Diabetic educator    PROCEDURES  CT soft tissue neck with IV contrast:     1.  Redemonstration of a right anterolateral neck mass, probably within the right thyroid lobe and appearing larger than on the prior scan.  2.  No other significant findings.   Reading Provider Reading Date   Jordan Calderón M.D. Feb 2, 2018 2/1/2018 8:35 PM    HISTORY/REASON FOR EXAM:  Neck mass      TECHNIQUE/EXAM DESCRIPTION:  Ultrasound of the soft tissues of the head and neck.    COMPARISON:  None    FINDINGS:  The right thyroid lobe is surgically absent. The left lobe as a normal sonographic appearance, measuring 3.08 cm in longest dimension. The isthmus contains what appear to be two closely adjacent nodular lesions the overall complex of which measures about   2.2 cm diameter.  Separate from the above, there is a heterogeneous, hypervascular mass just to the right of midline, which measures 3.4 x 1.9 x 3.4 cm.  There appear to be three parathyroid glands within the right thyroid fossa.       Impression       1.  Nonspecific hypervascular mass to the right of midline, accounting for the palpable nodule..  2.  Closely adjacent nodular lesions within the thyroid isthmus..   Reading Provider Reading Date   Jordan Calderón M.D. Feb 1, 2018   Signing Provider Signing Date Signing Time   Jordan Calderón M.D. Feb 1, 2018  9:33 PM   Order Detail Report          LABORATORY  Lab Results   Component Value Date/Time    SODIUM 136 02/05/2018 04:04 AM    POTASSIUM 3.5 (L) 02/05/2018 04:04 AM    CHLORIDE 102 02/05/2018 04:04 AM    CO2 24 02/05/2018 04:04 AM    GLUCOSE 219 (H) 02/05/2018 04:04 AM    BUN 9 02/05/2018 04:04 AM    CREATININE 0.66 02/05/2018 04:04 AM        Lab Results   Component Value Date/Time    WBC 7.8 02/03/2018 04:12 AM    HEMOGLOBIN 12.1 (L) 02/03/2018 04:12 AM    HEMATOCRIT 35.6 (L) 02/03/2018 04:12 AM    PLATELETCT 151 (L) 02/03/2018 04:12 AM        Total time of the discharge process exceeds 45 minutes

## 2018-02-06 NOTE — PROGRESS NOTES
Patient's IV discontinued and discharge instructions given. Patient discharged home with self via city bus.

## 2018-02-10 ENCOUNTER — HOME CARE VISIT (OUTPATIENT)
Dept: HOME HEALTH SERVICES | Facility: HOME HEALTHCARE | Age: 38
End: 2018-02-10
Payer: MEDICAID

## 2018-02-10 PROCEDURE — 665001 SOC-HOME HEALTH

## 2018-02-10 PROCEDURE — G0493 RN CARE EA 15 MIN HH/HOSPICE: HCPCS

## 2018-02-11 VITALS
DIASTOLIC BLOOD PRESSURE: 68 MMHG | OXYGEN SATURATION: 97 % | SYSTOLIC BLOOD PRESSURE: 110 MMHG | HEIGHT: 78 IN | WEIGHT: 298 LBS | RESPIRATION RATE: 18 BRPM | TEMPERATURE: 97 F | HEART RATE: 69 BPM | BODY MASS INDEX: 34.48 KG/M2

## 2018-02-12 SDOH — ECONOMIC STABILITY: HOUSING INSECURITY: UNSAFE APPLIANCES: 0

## 2018-02-12 SDOH — ECONOMIC STABILITY: HOUSING INSECURITY: UNSAFE COOKING RANGE AREA: 0

## 2018-02-12 ASSESSMENT — ENCOUNTER SYMPTOMS
DEPRESSED MOOD: 1
VOMITING: DENIES
NAUSEA: DENIES

## 2018-02-12 ASSESSMENT — PATIENT HEALTH QUESTIONNAIRE - PHQ9
2. FEELING DOWN, DEPRESSED, IRRITABLE, OR HOPELESS: 01
1. LITTLE INTEREST OR PLEASURE IN DOING THINGS: 00

## 2018-02-12 ASSESSMENT — ACTIVITIES OF DAILY LIVING (ADL)
HOME_HEALTH_OASIS: 01
HOME_HEALTH_OASIS: 00
OASIS_M1830: 00

## 2018-02-13 ENCOUNTER — HOME CARE VISIT (OUTPATIENT)
Dept: HOME HEALTH SERVICES | Facility: HOME HEALTHCARE | Age: 38
End: 2018-02-13
Payer: MEDICAID

## 2018-02-13 ENCOUNTER — TELEPHONE (OUTPATIENT)
Dept: VASCULAR LAB | Facility: MEDICAL CENTER | Age: 38
End: 2018-02-13

## 2018-02-13 NOTE — TELEPHONE ENCOUNTER
Notified Lori Monroe, APRN, that patient is non adherent with his metformin and glimepiride.    Martina Wells, PharmD

## 2018-02-13 NOTE — TELEPHONE ENCOUNTER
Reviewed HH medications. No major drug interactions or problems. Pt is not taking several of his diabetes medications and this needs to be addressed as his admission was for hyperglycemia. Left VM for pt to please call back to discuss why he is not taking glimepiride or metformin    Ruth Carey, Pharm D

## 2018-02-15 ENCOUNTER — HOME CARE VISIT (OUTPATIENT)
Dept: HOME HEALTH SERVICES | Facility: HOME HEALTHCARE | Age: 38
End: 2018-02-15
Payer: MEDICAID

## 2018-02-15 VITALS
RESPIRATION RATE: 18 BRPM | SYSTOLIC BLOOD PRESSURE: 118 MMHG | TEMPERATURE: 97.4 F | HEART RATE: 75 BPM | DIASTOLIC BLOOD PRESSURE: 70 MMHG | OXYGEN SATURATION: 97 %

## 2018-02-15 PROCEDURE — G0162 HHC RN E&M PLAN SVS, 15 MIN: HCPCS

## 2018-02-15 ASSESSMENT — ENCOUNTER SYMPTOMS
VOMITING: DENIES
NAUSEA: DENIES

## 2018-02-16 ENCOUNTER — HOME CARE VISIT (OUTPATIENT)
Dept: HOME HEALTH SERVICES | Facility: HOME HEALTHCARE | Age: 38
End: 2018-02-16
Payer: MEDICAID

## 2018-02-20 ENCOUNTER — HOME CARE VISIT (OUTPATIENT)
Dept: HOME HEALTH SERVICES | Facility: HOME HEALTHCARE | Age: 38
End: 2018-02-20
Payer: MEDICAID

## 2018-02-23 ENCOUNTER — HOME CARE VISIT (OUTPATIENT)
Dept: HOME HEALTH SERVICES | Facility: HOME HEALTHCARE | Age: 38
End: 2018-02-23
Payer: MEDICAID

## 2018-02-23 PROCEDURE — G0162 HHC RN E&M PLAN SVS, 15 MIN: HCPCS

## 2018-02-26 ENCOUNTER — HOME CARE VISIT (OUTPATIENT)
Dept: HOME HEALTH SERVICES | Facility: HOME HEALTHCARE | Age: 38
End: 2018-02-26
Payer: MEDICAID

## 2018-02-26 VITALS
OXYGEN SATURATION: 98 % | TEMPERATURE: 97.1 F | RESPIRATION RATE: 18 BRPM | SYSTOLIC BLOOD PRESSURE: 118 MMHG | DIASTOLIC BLOOD PRESSURE: 60 MMHG | HEART RATE: 77 BPM

## 2018-02-26 SDOH — ECONOMIC STABILITY: HOUSING INSECURITY: UNSAFE COOKING RANGE AREA: 0

## 2018-02-26 SDOH — ECONOMIC STABILITY: HOUSING INSECURITY: UNSAFE APPLIANCES: 0

## 2018-02-26 ASSESSMENT — ENCOUNTER SYMPTOMS
VOMITING: DENIES
NAUSEA: DENIES

## 2018-02-27 ENCOUNTER — HOME CARE VISIT (OUTPATIENT)
Dept: HOME HEALTH SERVICES | Facility: HOME HEALTHCARE | Age: 38
End: 2018-02-27
Payer: MEDICAID

## 2018-02-27 VITALS
SYSTOLIC BLOOD PRESSURE: 118 MMHG | DIASTOLIC BLOOD PRESSURE: 60 MMHG | TEMPERATURE: 97.4 F | OXYGEN SATURATION: 97 % | HEART RATE: 70 BPM | RESPIRATION RATE: 16 BRPM

## 2018-02-27 PROCEDURE — G0496 LPN CARE TRAIN/EDU IN HH: HCPCS

## 2018-02-27 ASSESSMENT — ENCOUNTER SYMPTOMS: DIFFICULTY THINKING: 1

## 2018-02-28 NOTE — ADDENDUM NOTE
Encounter addended by: Jon Cardoso M.D. on: 2/28/2018  3:25 PM<BR>    Actions taken: Order Reconciliation Section accessed

## 2018-03-01 ASSESSMENT — ENCOUNTER SYMPTOMS: POOR JUDGMENT: 1

## 2018-03-02 ENCOUNTER — HOME CARE VISIT (OUTPATIENT)
Dept: HOME HEALTH SERVICES | Facility: HOME HEALTHCARE | Age: 38
End: 2018-03-02
Payer: MEDICAID

## 2018-03-05 ENCOUNTER — HOME CARE VISIT (OUTPATIENT)
Dept: HOME HEALTH SERVICES | Facility: HOME HEALTHCARE | Age: 38
End: 2018-03-05
Payer: MEDICAID

## 2018-03-05 VITALS
DIASTOLIC BLOOD PRESSURE: 68 MMHG | HEART RATE: 84 BPM | OXYGEN SATURATION: 97 % | RESPIRATION RATE: 18 BRPM | SYSTOLIC BLOOD PRESSURE: 118 MMHG | TEMPERATURE: 97.1 F

## 2018-03-05 PROCEDURE — G0162 HHC RN E&M PLAN SVS, 15 MIN: HCPCS

## 2018-03-05 SDOH — ECONOMIC STABILITY: HOUSING INSECURITY: UNSAFE APPLIANCES: 0

## 2018-03-05 SDOH — ECONOMIC STABILITY: HOUSING INSECURITY: UNSAFE COOKING RANGE AREA: 0

## 2018-03-05 ASSESSMENT — ACTIVITIES OF DAILY LIVING (ADL)
HOME_HEALTH_OASIS: 00
OASIS_M1830: 00

## 2018-03-06 ENCOUNTER — HOME CARE VISIT (OUTPATIENT)
Dept: HOME HEALTH SERVICES | Facility: HOME HEALTHCARE | Age: 38
End: 2018-03-06
Payer: MEDICAID

## 2018-03-08 ENCOUNTER — HOSPITAL ENCOUNTER (EMERGENCY)
Facility: MEDICAL CENTER | Age: 38
End: 2018-03-08
Attending: EMERGENCY MEDICINE
Payer: MEDICAID

## 2018-03-08 VITALS
DIASTOLIC BLOOD PRESSURE: 75 MMHG | TEMPERATURE: 96.8 F | BODY MASS INDEX: 34.19 KG/M2 | WEIGHT: 295.86 LBS | RESPIRATION RATE: 19 BRPM | SYSTOLIC BLOOD PRESSURE: 131 MMHG | HEART RATE: 63 BPM | OXYGEN SATURATION: 97 %

## 2018-03-08 DIAGNOSIS — R73.9 HYPERGLYCEMIA: ICD-10-CM

## 2018-03-08 LAB
ALBUMIN SERPL BCP-MCNC: 4.4 G/DL (ref 3.2–4.9)
ALBUMIN/GLOB SERPL: 1.8 G/DL
ALP SERPL-CCNC: 62 U/L (ref 30–99)
ALT SERPL-CCNC: 16 U/L (ref 2–50)
ANION GAP SERPL CALC-SCNC: 10 MMOL/L (ref 0–11.9)
AST SERPL-CCNC: 19 U/L (ref 12–45)
BASOPHILS # BLD AUTO: 1 % (ref 0–1.8)
BASOPHILS # BLD: 0.08 K/UL (ref 0–0.12)
BILIRUB SERPL-MCNC: 0.6 MG/DL (ref 0.1–1.5)
BUN SERPL-MCNC: 15 MG/DL (ref 8–22)
CALCIUM SERPL-MCNC: 9 MG/DL (ref 8.5–10.5)
CHLORIDE SERPL-SCNC: 99 MMOL/L (ref 96–112)
CO2 SERPL-SCNC: 27 MMOL/L (ref 20–33)
CREAT SERPL-MCNC: 0.91 MG/DL (ref 0.5–1.4)
EKG IMPRESSION: NORMAL
EOSINOPHIL # BLD AUTO: 0.11 K/UL (ref 0–0.51)
EOSINOPHIL NFR BLD: 1.4 % (ref 0–6.9)
ERYTHROCYTE [DISTWIDTH] IN BLOOD BY AUTOMATED COUNT: 43.9 FL (ref 35.9–50)
GLOBULIN SER CALC-MCNC: 2.5 G/DL (ref 1.9–3.5)
GLUCOSE BLD-MCNC: 305 MG/DL (ref 65–99)
GLUCOSE SERPL-MCNC: 315 MG/DL (ref 65–99)
HCT VFR BLD AUTO: 41.7 % (ref 42–52)
HGB BLD-MCNC: 14.3 G/DL (ref 14–18)
IMM GRANULOCYTES # BLD AUTO: 0.14 K/UL (ref 0–0.11)
IMM GRANULOCYTES NFR BLD AUTO: 1.7 % (ref 0–0.9)
LYMPHOCYTES # BLD AUTO: 2.37 K/UL (ref 1–4.8)
LYMPHOCYTES NFR BLD: 29.6 % (ref 22–41)
MCH RBC QN AUTO: 32.1 PG (ref 27–33)
MCHC RBC AUTO-ENTMCNC: 34.3 G/DL (ref 33.7–35.3)
MCV RBC AUTO: 93.7 FL (ref 81.4–97.8)
MONOCYTES # BLD AUTO: 0.76 K/UL (ref 0–0.85)
MONOCYTES NFR BLD AUTO: 9.5 % (ref 0–13.4)
NEUTROPHILS # BLD AUTO: 4.56 K/UL (ref 1.82–7.42)
NEUTROPHILS NFR BLD: 56.8 % (ref 44–72)
NRBC # BLD AUTO: 0 K/UL
NRBC BLD-RTO: 0 /100 WBC
PLATELET # BLD AUTO: 251 K/UL (ref 164–446)
PMV BLD AUTO: 9.8 FL (ref 9–12.9)
POTASSIUM SERPL-SCNC: 4.2 MMOL/L (ref 3.6–5.5)
PROT SERPL-MCNC: 6.9 G/DL (ref 6–8.2)
RBC # BLD AUTO: 4.45 M/UL (ref 4.7–6.1)
SODIUM SERPL-SCNC: 136 MMOL/L (ref 135–145)
WBC # BLD AUTO: 8 K/UL (ref 4.8–10.8)

## 2018-03-08 PROCEDURE — 96372 THER/PROPH/DIAG INJ SC/IM: CPT

## 2018-03-08 PROCEDURE — 85025 COMPLETE CBC W/AUTO DIFF WBC: CPT

## 2018-03-08 PROCEDURE — 36415 COLL VENOUS BLD VENIPUNCTURE: CPT

## 2018-03-08 PROCEDURE — 93005 ELECTROCARDIOGRAM TRACING: CPT | Performed by: EMERGENCY MEDICINE

## 2018-03-08 PROCEDURE — 99284 EMERGENCY DEPT VISIT MOD MDM: CPT

## 2018-03-08 PROCEDURE — 80053 COMPREHEN METABOLIC PANEL: CPT

## 2018-03-08 PROCEDURE — 700102 HCHG RX REV CODE 250 W/ 637 OVERRIDE(OP): Performed by: EMERGENCY MEDICINE

## 2018-03-08 PROCEDURE — 82962 GLUCOSE BLOOD TEST: CPT

## 2018-03-08 RX ADMIN — INSULIN HUMAN 5 UNITS: 100 INJECTION, SOLUTION PARENTERAL at 21:38

## 2018-03-08 ASSESSMENT — LIFESTYLE VARIABLES
TOTAL SCORE: 0
HAVE PEOPLE ANNOYED YOU BY CRITICIZING YOUR DRINKING: NO
HAVE YOU EVER FELT YOU SHOULD CUT DOWN ON YOUR DRINKING: NO
EVER FELT BAD OR GUILTY ABOUT YOUR DRINKING: NO
EVER HAD A DRINK FIRST THING IN THE MORNING TO STEADY YOUR NERVES TO GET RID OF A HANGOVER: NO
CONSUMPTION TOTAL: INCOMPLETE
DO YOU DRINK ALCOHOL: NO
TOTAL SCORE: 0
TOTAL SCORE: 0

## 2018-03-08 ASSESSMENT — ENCOUNTER SYMPTOMS
FEVER: 0
SHORTNESS OF BREATH: 0
VOMITING: 0

## 2018-03-09 ENCOUNTER — PATIENT OUTREACH (OUTPATIENT)
Dept: HEALTH INFORMATION MANAGEMENT | Facility: OTHER | Age: 38
End: 2018-03-09

## 2018-03-09 ENCOUNTER — HOSPITAL ENCOUNTER (EMERGENCY)
Facility: MEDICAL CENTER | Age: 38
End: 2018-03-09
Attending: EMERGENCY MEDICINE
Payer: MEDICAID

## 2018-03-09 VITALS
OXYGEN SATURATION: 96 % | BODY MASS INDEX: 33.9 KG/M2 | HEART RATE: 64 BPM | SYSTOLIC BLOOD PRESSURE: 136 MMHG | RESPIRATION RATE: 14 BRPM | TEMPERATURE: 98 F | HEIGHT: 78 IN | WEIGHT: 292.99 LBS | DIASTOLIC BLOOD PRESSURE: 73 MMHG

## 2018-03-09 DIAGNOSIS — R73.9 HYPERGLYCEMIA: ICD-10-CM

## 2018-03-09 LAB
ALBUMIN SERPL BCP-MCNC: 4.4 G/DL (ref 3.2–4.9)
ALBUMIN/GLOB SERPL: 1.8 G/DL
ALP SERPL-CCNC: 60 U/L (ref 30–99)
ALT SERPL-CCNC: 15 U/L (ref 2–50)
ANION GAP SERPL CALC-SCNC: 9 MMOL/L (ref 0–11.9)
APPEARANCE UR: CLEAR
AST SERPL-CCNC: 18 U/L (ref 12–45)
BASOPHILS # BLD AUTO: 1.1 % (ref 0–1.8)
BASOPHILS # BLD: 0.09 K/UL (ref 0–0.12)
BILIRUB SERPL-MCNC: 0.6 MG/DL (ref 0.1–1.5)
BUN SERPL-MCNC: 15 MG/DL (ref 8–22)
CALCIUM SERPL-MCNC: 9.1 MG/DL (ref 8.5–10.5)
CHLORIDE SERPL-SCNC: 99 MMOL/L (ref 96–112)
CO2 SERPL-SCNC: 27 MMOL/L (ref 20–33)
COLOR UR AUTO: YELLOW
CREAT SERPL-MCNC: 0.89 MG/DL (ref 0.5–1.4)
EOSINOPHIL # BLD AUTO: 0.14 K/UL (ref 0–0.51)
EOSINOPHIL NFR BLD: 1.7 % (ref 0–6.9)
ERYTHROCYTE [DISTWIDTH] IN BLOOD BY AUTOMATED COUNT: 44.6 FL (ref 35.9–50)
GLOBULIN SER CALC-MCNC: 2.5 G/DL (ref 1.9–3.5)
GLUCOSE BLD-MCNC: 282 MG/DL (ref 65–99)
GLUCOSE BLD-MCNC: 288 MG/DL (ref 65–99)
GLUCOSE BLD-MCNC: 360 MG/DL (ref 65–99)
GLUCOSE SERPL-MCNC: 342 MG/DL (ref 65–99)
GLUCOSE UR QL STRIP.AUTO: 500 MG/DL
HCT VFR BLD AUTO: 41.9 % (ref 42–52)
HGB BLD-MCNC: 14.1 G/DL (ref 14–18)
IMM GRANULOCYTES # BLD AUTO: 0.13 K/UL (ref 0–0.11)
IMM GRANULOCYTES NFR BLD AUTO: 1.6 % (ref 0–0.9)
KETONES UR QL STRIP.AUTO: 15 MG/DL
LEUKOCYTE ESTERASE UR QL STRIP.AUTO: NEGATIVE
LYMPHOCYTES # BLD AUTO: 2.71 K/UL (ref 1–4.8)
LYMPHOCYTES NFR BLD: 32.5 % (ref 22–41)
MCH RBC QN AUTO: 31.3 PG (ref 27–33)
MCHC RBC AUTO-ENTMCNC: 33.7 G/DL (ref 33.7–35.3)
MCV RBC AUTO: 93.1 FL (ref 81.4–97.8)
MONOCYTES # BLD AUTO: 0.79 K/UL (ref 0–0.85)
MONOCYTES NFR BLD AUTO: 9.5 % (ref 0–13.4)
NEUTROPHILS # BLD AUTO: 4.47 K/UL (ref 1.82–7.42)
NEUTROPHILS NFR BLD: 53.6 % (ref 44–72)
NITRITE UR QL STRIP.AUTO: NEGATIVE
NRBC # BLD AUTO: 0 K/UL
NRBC BLD-RTO: 0 /100 WBC
PH UR STRIP.AUTO: 5.5 [PH]
PLATELET # BLD AUTO: 255 K/UL (ref 164–446)
PMV BLD AUTO: 9.9 FL (ref 9–12.9)
POTASSIUM SERPL-SCNC: 3.9 MMOL/L (ref 3.6–5.5)
PROT SERPL-MCNC: 6.9 G/DL (ref 6–8.2)
PROT UR QL STRIP: NEGATIVE MG/DL
RBC # BLD AUTO: 4.5 M/UL (ref 4.7–6.1)
RBC UR QL AUTO: NEGATIVE
SODIUM SERPL-SCNC: 135 MMOL/L (ref 135–145)
SP GR UR: 1.02
WBC # BLD AUTO: 8.3 K/UL (ref 4.8–10.8)

## 2018-03-09 PROCEDURE — 96360 HYDRATION IV INFUSION INIT: CPT

## 2018-03-09 PROCEDURE — 700111 HCHG RX REV CODE 636 W/ 250 OVERRIDE (IP): Performed by: EMERGENCY MEDICINE

## 2018-03-09 PROCEDURE — 85025 COMPLETE CBC W/AUTO DIFF WBC: CPT

## 2018-03-09 PROCEDURE — 700105 HCHG RX REV CODE 258: Performed by: EMERGENCY MEDICINE

## 2018-03-09 PROCEDURE — 99284 EMERGENCY DEPT VISIT MOD MDM: CPT

## 2018-03-09 PROCEDURE — 36415 COLL VENOUS BLD VENIPUNCTURE: CPT

## 2018-03-09 PROCEDURE — 80053 COMPREHEN METABOLIC PANEL: CPT

## 2018-03-09 PROCEDURE — 81002 URINALYSIS NONAUTO W/O SCOPE: CPT

## 2018-03-09 PROCEDURE — 94760 N-INVAS EAR/PLS OXIMETRY 1: CPT

## 2018-03-09 PROCEDURE — 82962 GLUCOSE BLOOD TEST: CPT

## 2018-03-09 RX ORDER — ONDANSETRON 4 MG/1
4 TABLET, ORALLY DISINTEGRATING ORAL ONCE
Status: COMPLETED | OUTPATIENT
Start: 2018-03-09 | End: 2018-03-09

## 2018-03-09 RX ORDER — SODIUM CHLORIDE 9 MG/ML
1000 INJECTION, SOLUTION INTRAVENOUS ONCE
Status: COMPLETED | OUTPATIENT
Start: 2018-03-09 | End: 2018-03-09

## 2018-03-09 RX ORDER — ONDANSETRON 4 MG/1
4 TABLET, ORALLY DISINTEGRATING ORAL EVERY 8 HOURS PRN
Qty: 10 TAB | Refills: 0 | Status: SHIPPED
Start: 2018-03-09 | End: 2018-04-10

## 2018-03-09 RX ADMIN — ONDANSETRON 4 MG: 4 TABLET, ORALLY DISINTEGRATING ORAL at 23:12

## 2018-03-09 RX ADMIN — ONDANSETRON 4 MG: 4 TABLET, ORALLY DISINTEGRATING ORAL at 21:19

## 2018-03-09 RX ADMIN — SODIUM CHLORIDE 1000 ML: 9 INJECTION, SOLUTION INTRAVENOUS at 21:53

## 2018-03-09 NOTE — ED TRIAGE NOTES
"Norm Prabhakar  37 y.o. male  Chief Complaint   Patient presents with   • High Blood Sugar     Pt states, \"the last coupla days my sugars been high. I've just been having trouble getting it down.\"       Pt amb to triage with steady gait for above complaint. Pt also complains of associated polydipsia and polyuria. Pt reports being compliant with medications at home.  FSBS in triage of 305. Pt reports FSBS @ home between 300-500.  Pt is alert and oriented, speaking in full sentences, follows commands and responds appropriately to questions. NAD. Resp are even and unlabored.  Pt placed in lobby. Pt educated on triage process. Pt encouraged to alert staff for any changes.    "

## 2018-03-09 NOTE — ED PROVIDER NOTES
"ED Provider Note    Scribed for Sal Gan M.D. by Ángela Webb. 3/8/2018  8:02 PM    Means of arrival: Walk-in  History obtained from: Patient  Limitations: None    CHIEF COMPLAINT  Chief Complaint   Patient presents with   • High Blood Sugar     Pt states, \"the last coupla days my sugars been high. I've just been having trouble getting it down.\"       HPI  Norm Prabhakar is a 37 y.o. male with a history of Diabetes who presents to the Emergency Department for evaluation of hyperglycemia. Patient reports that he has been taking his medications including Insulin as directed. He states that he has been eating healthy. Patient endorses general malaise and urinary frequency. No complaints of chest pain, shortness of breath, fevers, vomiting, or rash.  Reports mild runny nose. He denies any decreased exertional capacity.      REVIEW OF SYSTEMS  Review of Systems   Constitutional: Positive for malaise/fatigue. Negative for fever.        Hyperglycemia   Respiratory: Negative for shortness of breath.    Cardiovascular: Negative for chest pain.   Gastrointestinal: Negative for vomiting.   Genitourinary: Positive for frequency.   Skin: Negative for rash.   All other systems reviewed and are negative.    See HPI for further details. C.     PAST MEDICAL HISTORY   has a past medical history of Anxiety; ASTHMA; Bipolar 1 disorder (CMS-HCC); Depression; Fall; Glaucoma; Glaucoma (1982); Hypothyroidism; Indigestion; Mental disorder; Murmur; Pneumonia; Psychiatric problem (2002); S/P thyroidectomy; Seizure (CMS-HCC) (2010); Seizure disorder (CMS-HCC); and Unspecified disorder of thyroid.  Diabetes    SOCIAL HISTORY  Social History     Social History Main Topics   • Smoking status: Former Smoker     Packs/day: 0.25     Years: 4.00     Types: Cigarettes     Quit date: 1/1/2014   • Smokeless tobacco: Never Used   • Alcohol use No   • Drug use: Yes     Types: Inhaled      Comment: marijuana       SURGICAL HISTORY   has a " "past surgical history that includes eye surgery; thyroid lobectomy; and other.    CURRENT MEDICATIONS  No current facility-administered medications for this encounter.     Current Outpatient Prescriptions:   •  ofloxacin otic sol (FLOXIN OTIC) 0.3 % Solution, Place 5 Drops in right ear 2 times a day., Disp: , Rfl:   •  Insulin Glargine (BASAGLAR KWIKPEN SC), Inject 15 Units as instructed every day., Disp: , Rfl:   •  glucose 4 g 4 g Chew Tab, Take 4 Tabs by mouth as needed for Low Blood Sugar (If FSBG is less than or equal to 70 mg/dL and patient able to eat or drink). (Patient not taking: Reported on 2/12/2018), Disp: 30 Tab, Rfl: 0  •  metFORMIN (GLUCOPHAGE) 1000 MG tablet, Take 1 Tab by mouth 2 times a day, with meals. (Patient not taking: Reported on 2/12/2018), Disp: 60 Tab, Rfl: 2  •  glimepiride (AMARYL) 1 MG tablet, Take 1 Tab by mouth every morning., Disp: 30 Tab, Rfl: 2  •  divalproex (DEPAKOTE) 500 MG Tablet Delayed Response, Take 1,000 mg by mouth 2 Times a Day., Disp: , Rfl:   •  levothyroxine (SYNTHROID) 125 MCG Tab, Take 1 Tab by mouth Every morning on an empty stomach., Disp: 30 Tab, Rfl: 0  •  sertraline (ZOLOFT) 100 MG Tab, Take 1 Tab by mouth every morning., Disp: 30 Tab, Rfl: 0  •  latanoprost (XALATAN) 0.005 % Solution, Place 1 Drop in both eyes every evening., Disp: , Rfl:     ALLERGIES  Allergies   Allergen Reactions   • Abilify Unspecified     \"Feeling tired, like I don't even know whats going on around me\"   • Fish      Pt reports fish causes him to be sick to his stomach         PHYSICAL EXAM  /84   Pulse 75   Temp 36 °C (96.8 °F)   Resp 16   Wt (!) 134.2 kg (295 lb 13.7 oz)   SpO2 96%   BMI 34.19 kg/m²      Constitutional: Well developed, Well nourished, No acute distress, Non-toxic appearance.   HENT: Normocephalic, Atraumatic,  Eyes: EOM intact, PERRL  Neck: normal ROM  Cardiovascular: Regular rate and rhythm  Lungs: Clear to auscultation bilaterally, easy unlabored " respirations   Abdomen: Bowel sounds normal, Soft, No tenderness  Skin: Warm, Dry, no rash  Extremities: No edema to lower extremities  Neurologic: Alert and oriented, appropriate, follows commands, moving all extremities, normal speech   Psychiatric: Affect normal    LABS  Results for orders placed or performed during the hospital encounter of 03/08/18   CBC WITH DIFFERENTIAL   Result Value Ref Range    WBC 8.0 4.8 - 10.8 K/uL    RBC 4.45 (L) 4.70 - 6.10 M/uL    Hemoglobin 14.3 14.0 - 18.0 g/dL    Hematocrit 41.7 (L) 42.0 - 52.0 %    MCV 93.7 81.4 - 97.8 fL    MCH 32.1 27.0 - 33.0 pg    MCHC 34.3 33.7 - 35.3 g/dL    RDW 43.9 35.9 - 50.0 fL    Platelet Count 251 164 - 446 K/uL    MPV 9.8 9.0 - 12.9 fL    Neutrophils-Polys 56.80 44.00 - 72.00 %    Lymphocytes 29.60 22.00 - 41.00 %    Monocytes 9.50 0.00 - 13.40 %    Eosinophils 1.40 0.00 - 6.90 %    Basophils 1.00 0.00 - 1.80 %    Immature Granulocytes 1.70 (H) 0.00 - 0.90 %    Nucleated RBC 0.00 /100 WBC    Neutrophils (Absolute) 4.56 1.82 - 7.42 K/uL    Lymphs (Absolute) 2.37 1.00 - 4.80 K/uL    Monos (Absolute) 0.76 0.00 - 0.85 K/uL    Eos (Absolute) 0.11 0.00 - 0.51 K/uL    Baso (Absolute) 0.08 0.00 - 0.12 K/uL    Immature Granulocytes (abs) 0.14 (H) 0.00 - 0.11 K/uL    NRBC (Absolute) 0.00 K/uL   COMP METABOLIC PANEL   Result Value Ref Range    Sodium 136 135 - 145 mmol/L    Potassium 4.2 3.6 - 5.5 mmol/L    Chloride 99 96 - 112 mmol/L    Co2 27 20 - 33 mmol/L    Anion Gap 10.0 0.0 - 11.9    Glucose 315 (H) 65 - 99 mg/dL    Bun 15 8 - 22 mg/dL    Creatinine 0.91 0.50 - 1.40 mg/dL    Calcium 9.0 8.5 - 10.5 mg/dL    AST(SGOT) 19 12 - 45 U/L    ALT(SGPT) 16 2 - 50 U/L    Alkaline Phosphatase 62 30 - 99 U/L    Total Bilirubin 0.6 0.1 - 1.5 mg/dL    Albumin 4.4 3.2 - 4.9 g/dL    Total Protein 6.9 6.0 - 8.2 g/dL    Globulin 2.5 1.9 - 3.5 g/dL    A-G Ratio 1.8 g/dL   ESTIMATED GFR   Result Value Ref Range    GFR If African American >60 >60 mL/min/1.73 m 2    GFR If Non  African American >60 >60 mL/min/1.73 m 2   ACCU-CHEK GLUCOSE   Result Value Ref Range    Glucose - Accu-Ck 305 (H) 65 - 99 mg/dL   EKG (ER)   Result Value Ref Range    Report       [facility] Emergency Dept.    Test Date:  2018  Pt Name:    HOLLY KIM                 Department: ER  MRN:        [id number]                      Room:       [facility]  Gender:     Male                         Technician: [id number]  :        1980                   Requested By:CORINNE GUPTA  Order #:    [id number]                    Reading MD:    Measurements  Intervals                                Axis  Rate:       58                           P:          49  KY:         168                          QRS:        21  QRSD:       114                          T:          33  QT:         396  QTc:        389    Interpretive Statements  SINUS BRADYCARDIA  NONSPECIFIC INTRAVENTRICULAR CONDUCTION DELAY  BORDERLINE LOW VOLTAGE IN FRONTAL LEADS  Compared to ECG 2017 00:31:18  Sinus rhythm no longer present         COURSE & MEDICAL DECISION MAKING  Pertinent Labs & Imaging studies reviewed. (See chart for details)    8:02 PM Patient seen and examined at bedside. The patient presents with hyperglycemia. Ordered for CBC, CMP, POC Urinalysis, Estimated GFR, Accu-Chek Glucose to evaluate. Patient will be treated with Humulin R injection 5 units for his symptoms.     9:00 PM Reviewed lab results as noted above, values largely unremarkable. The patient will be discharged at this time and should return if symptoms worsen or if new symptoms arise. The patient understands and agrees to plan.     Patient with hyperglycemia. She has a normal EKG and normal basic labs aside from his elevated glucose. Patient given subcu insulin. Patient without any obvious signs of infection or acute pathology otherwise. Possible underlying viral illness. He will follow up as primary care physician. I discussed return precautions in depth  with patient.    The patient is referred to a primary physician for blood pressure management, diabetic screening, and for all other preventative health concerns.    DISPOSITION:  Patient will be discharged home in stable condition.    FOLLOW UP:  ANIKET Chung  [address] [place] [id number]  [phone]    Schedule an appointment as soon as possible for a visit        FINAL IMPRESSION  1.  hyperglycemia        Ángela ROB (Robertibe), am scribing for, and in the presence of, Sal Gan M.D..    Electronically signed by: Ángela Webb (Stephanie), 3/8/2018    Sal ROB M.D. personally performed the services described in this documentation, as scribed by Ángela Webb in my presence, and it is both accurate and complete.    The note accurately reflects work and decisions made by me.  Sal Gan  3/9/2018  12:06 AM

## 2018-03-09 NOTE — DISCHARGE INSTRUCTIONS
Your blood sugar is high but the rest your labs are normal. He may have a small viral illnesses making her blood sugar increased. If you develop any chest pain shortness of breath. Some vomiting or abdominal pain return to emergency department. Otherwise follow-up with her primary care physician. Our scheduling department has attempted to call your primary care physician to expedite this. Please call your primary care physician tomorrow to ensure you have an appointment within the next 3-4 days.    Hyperglycemia  Hyperglycemia is when the sugar (glucose) level in your blood is too high. It may not cause symptoms. If you do have symptoms, they may include warning signs, such as:  · Feeling more thirsty than normal.  · Hunger.  · Feeling tired.  · Needing to pee (urinate) more than normal.  · Blurry eyesight (vision).  You may get other symptoms as it gets worse, such as:  · Dry mouth.  · Not being hungry (loss of appetite).  · Fruity-smelling breath.  · Weakness.  · Weight gain or loss that is not planned. Weight loss may be fast.  · A tingling or numb feeling in your hands or feet.  · Headache.  · Skin that does not bounce back quickly when it is lightly pinched and released (poor skin turgor).  · Pain in your belly (abdomen).  · Cuts or bruises that heal slowly.  High blood sugar can happen to people who do or do not have diabetes. High blood sugar can happen slowly or quickly, and it can be an emergency.  Follow these instructions at home:  General instructions  · Take over-the-counter and prescription medicines only as told by your doctor.  · Do not use products that contain nicotine or tobacco, such as cigarettes and e-cigarettes. If you need help quitting, ask your doctor.  · Limit alcohol intake to no more than 1 drink per day for nonpregnant women and 2 drinks per day for men. One drink equals 12 oz of beer, 5 oz of wine, or 1½ oz of hard liquor.  · Manage stress. If you need help with this, ask your  doctor.  · Keep all follow-up visits as told by your doctor. This is important.  Eating and drinking  · Stay at a healthy weight.  · Exercise regularly, as told by your doctor.  · Drink enough fluid, especially when you:  ¨ Exercise.  ¨ Get sick.  ¨ Are in hot temperatures.  · Eat healthy foods, such as:  ¨ Low-fat (lean) proteins.  ¨ Complex carbs (complex carbohydrates), such as whole wheat bread or brown rice.  ¨ Fresh fruits and vegetables.  ¨ Low-fat dairy products.  ¨ Healthy fats.  · Drink enough fluid to keep your pee (urine) clear or pale yellow.  If you have diabetes:  · Make sure you know the symptoms of hyperglycemia.  · Follow your diabetes management plan, as told by your doctor. Make sure you:  ¨ Take insulin and medicines as told.  ¨ Follow your exercise plan.  ¨ Follow your meal plan. Eat on time. Do not skip meals.  ¨ Check your blood sugar as often as told. Make sure to check before and after exercise. If you exercise longer or in a different way than you normally do, check your blood sugar more often.  ¨ Follow your sick day plan whenever you cannot eat or drink normally. Make this plan ahead of time with your doctor.  · Share your diabetes management plan with people in your workplace, school, and household.  · Check your urine for ketones when you are ill and as told by your doctor.  · Carry a card or wear jewelry that says that you have diabetes.  Contact a doctor if:  · Your blood sugar level is higher than 240 mg/dL (13.3 mmol/L) for 2 days in a row.  · You have problems keeping your blood sugar in your target range.  · High blood sugar happens often for you.  Get help right away if:  · You have trouble breathing.  · You have a change in how you think, feel, or act (mental status).  · You feel sick to your stomach (nauseous), and that feeling does not go away.  · You cannot stop throwing up (vomiting).  These symptoms may be an emergency. Do not wait to see if the symptoms will go away. Get  medical help right away. Call your local emergency services (911 in the [place] Do not drive yourself to the hospital.   Summary  · Hyperglycemia is when the sugar (glucose) level in your blood is too high.  · High blood sugar can happen to people who do or do not have diabetes.  · Make sure you drink enough fluids, eat healthy foods, and exercise regularly.  · Contact your doctor if you have problems keeping your blood sugar in your target range.  This information is not intended to replace advice given to you by your health care provider. Make sure you discuss any questions you have with your health care provider.  Please follow-up with your primary care provider for blood pressure management.  Document Released: 10/15/2010 Document Revised: 09/04/2017 Document Reviewed: 09/04/2017  Elsevier Interactive Patient Education © 2017 [employer]

## 2018-03-09 NOTE — ED TRIAGE NOTES
.  Chief Complaint   Patient presents with   • Sent by MD   • Elevated Glucose     fsbs 360 at triage   • Nausea     without emesis   • Head Ache     Ambulated to triage with above c/c. Pt reports has not eaten for 15 hours nor taken DM medication. Pt was fasting for bloodwork.

## 2018-03-09 NOTE — ED NOTES
Discharge instructions given to patient. Education provided on diagnosis, treatment, follow up, and prescriptions provided. Pt verbalized understanding. All questions answered. Pt ambulatory to lobby.

## 2018-03-10 NOTE — DISCHARGE INSTRUCTIONS
Hyperglycemia  Hyperglycemia is when the sugar (glucose) level in your blood is too high. It may not cause symptoms. If you do have symptoms, they may include warning signs, such as:  · Feeling more thirsty than normal.  · Hunger.  · Feeling tired.  · Needing to pee (urinate) more than normal.  · Blurry eyesight (vision).  You may get other symptoms as it gets worse, such as:  · Dry mouth.  · Not being hungry (loss of appetite).  · Fruity-smelling breath.  · Weakness.  · Weight gain or loss that is not planned. Weight loss may be fast.  · A tingling or numb feeling in your hands or feet.  · Headache.  · Skin that does not bounce back quickly when it is lightly pinched and released (poor skin turgor).  · Pain in your belly (abdomen).  · Cuts or bruises that heal slowly.  High blood sugar can happen to people who do or do not have diabetes. High blood sugar can happen slowly or quickly, and it can be an emergency.  Follow these instructions at home:  General instructions  · Take over-the-counter and prescription medicines only as told by your doctor.  · Do not use products that contain nicotine or tobacco, such as cigarettes and e-cigarettes. If you need help quitting, ask your doctor.  · Limit alcohol intake to no more than 1 drink per day for nonpregnant women and 2 drinks per day for men. One drink equals 12 oz of beer, 5 oz of wine, or 1½ oz of hard liquor.  · Manage stress. If you need help with this, ask your doctor.  · Keep all follow-up visits as told by your doctor. This is important.  Eating and drinking  · Stay at a healthy weight.  · Exercise regularly, as told by your doctor.  · Drink enough fluid, especially when you:  ¨ Exercise.  ¨ Get sick.  ¨ Are in hot temperatures.  · Eat healthy foods, such as:  ¨ Low-fat (lean) proteins.  ¨ Complex carbs (complex carbohydrates), such as whole wheat bread or brown rice.  ¨ Fresh fruits and vegetables.  ¨ Low-fat dairy products.  ¨ Healthy fats.  · Drink enough  fluid to keep your pee (urine) clear or pale yellow.  If you have diabetes:  · Make sure you know the symptoms of hyperglycemia.  · Follow your diabetes management plan, as told by your doctor. Make sure you:  ¨ Take insulin and medicines as told.  ¨ Follow your exercise plan.  ¨ Follow your meal plan. Eat on time. Do not skip meals.  ¨ Check your blood sugar as often as told. Make sure to check before and after exercise. If you exercise longer or in a different way than you normally do, check your blood sugar more often.  ¨ Follow your sick day plan whenever you cannot eat or drink normally. Make this plan ahead of time with your doctor.  · Share your diabetes management plan with people in your workplace, school, and household.  · Check your urine for ketones when you are ill and as told by your doctor.  · Carry a card or wear jewelry that says that you have diabetes.  Contact a doctor if:  · Your blood sugar level is higher than 240 mg/dL (13.3 mmol/L) for 2 days in a row.  · You have problems keeping your blood sugar in your target range.  · High blood sugar happens often for you.  Get help right away if:  · You have trouble breathing.  · You have a change in how you think, feel, or act (mental status).  · You feel sick to your stomach (nauseous), and that feeling does not go away.  · You cannot stop throwing up (vomiting).  These symptoms may be an emergency. Do not wait to see if the symptoms will go away. Get medical help right away. Call your local emergency services (911 in the [place] Do not drive yourself to the hospital.   Summary  · Hyperglycemia is when the sugar (glucose) level in your blood is too high.  · High blood sugar can happen to people who do or do not have diabetes.  · Make sure you drink enough fluids, eat healthy foods, and exercise regularly.  · Contact your doctor if you have problems keeping your blood sugar in your target range.  This information is not intended to replace advice given  to you by your health care provider. Make sure you discuss any questions you have with your health care provider.  Document Released: 10/15/2010 Document Revised: 09/04/2017 Document Reviewed: 09/04/2017  [employer]vier Interactive Patient Education © 2017 [employer]    Blood Glucose Monitoring, Adult  Monitoring your blood glucose (also know as blood sugar) helps you to manage your diabetes. It also helps you and your health care provider monitor your diabetes and determine how well your treatment plan is working.  WHY SHOULD YOU MONITOR YOUR BLOOD GLUCOSE?  · It can help you understand how food, exercise, and medicine affect your blood glucose.  · It allows you to know what your blood glucose is at any given moment. You can quickly tell if you are having low blood glucose (hypoglycemia) or high blood glucose (hyperglycemia).  · It can help you and your health care provider know how to adjust your medicines.  · It can help you understand how to manage an illness or adjust medicine for exercise.  WHEN SHOULD YOU TEST?  Your health care provider will help you decide how often you should check your blood glucose. This may depend on the type of diabetes you have, your diabetes control, or the types of medicines you are taking. Be sure to write down all of your blood glucose readings so that this information can be reviewed with your health care provider. See below for examples of testing times that your health care provider may suggest.  Type 1 Diabetes  · Test at least 2 times per day if your diabetes is well controlled, if you are using an insulin pump, or if you perform multiple daily injections.  · If your diabetes is not well controlled or if you are sick, you may need to test more often.  · It is a good idea to also test:  ¨ Before every insulin injection.  ¨ Before and after exercise.  ¨ Between meals and 2 hours after a meal.  ¨ Occasionally between 2:00 a.m. and 3:00 a.m.  Type 2 Diabetes  · If you are taking  "insulin, test at least 2 times per day. However, it is best to test before every insulin injection.  · If you take medicines by mouth (orally), test 2 times a day.  · If you are on a controlled diet, test once a day.  · If your diabetes is not well controlled or if you are sick, you may need to monitor more often.  HOW TO MONITOR YOUR BLOOD GLUCOSE  Supplies Needed  · Blood glucose meter.  · Test strips for your meter. Each meter has its own strips. You must use the strips that go with your own meter.  · A pricking needle (lancet).  · A device that holds the lancet (lancing device).  · A journal or log book to write down your results.  Procedure  · Wash your hands with soap and water. Alcohol is not preferred.  · Prick the side of your finger (not the tip) with the lancet.  · Gently milk the finger until a small drop of blood appears.  · Follow the instructions that come with your meter for inserting the test strip, applying blood to the strip, and using your blood glucose meter.  Other Areas to Get Blood for Testing  Some meters allow you to use other areas of your body (other than your finger) to test your blood. These areas are called alternative sites. The most common alternative sites are:  · The forearm.  · The thigh.  · The back area of the lower leg.  · The palm of the hand.  The blood flow in these areas is slower. Therefore, the blood glucose values you get may be delayed, and the numbers are different from what you would get from your fingers. Do not use alternative sites if you think you are having hypoglycemia. Your reading will not be accurate. Always use a finger if you are having hypoglycemia. Also, if you cannot feel your lows (hypoglycemia unawareness), always use your fingers for your blood glucose checks.  ADDITIONAL TIPS FOR GLUCOSE MONITORING  · Do not reuse lancets.  · Always carry your supplies with you.  · All blood glucose meters have a 24-hour \"hotline\" number to call if you have questions " or need help.  · Adjust (calibrate) your blood glucose meter with a control solution after finishing a few boxes of strips.  BLOOD GLUCOSE RECORD KEEPING  It is a good idea to keep a daily record or log of your blood glucose readings. Most glucose meters, if not all, keep your glucose records stored in the meter. Some meters come with the ability to download your records to your home computer. Keeping a record of your blood glucose readings is especially helpful if you are wanting to look for patterns. Make notes to go along with the blood glucose readings because you might forget what happened at that exact time. Keeping good records helps you and your health care provider to work together to achieve good diabetes management.      This information is not intended to replace advice given to you by your health care provider. Make sure you discuss any questions you have with your health care provider.     Document Released: 12/20/2004 Document Revised: 01/08/2016 Document Reviewed: 05/12/2014  [employer] Interactive Patient Education ©2016 [employer]

## 2018-03-10 NOTE — ED NOTES
Hyperglycemia order set ordered and initiated. Blood drawn and sent to lab. Apologized for wait time.

## 2018-03-10 NOTE — ED PROVIDER NOTES
"ED Provider Note    CHIEF COMPLAINT  Chief Complaint   Patient presents with   • Sent by MD   • Elevated Glucose     fsbs 360 at triage   • Nausea     without emesis   • Head Ache       HPI  Norm Prabhakar is a 37 y.o. male here for evaluation of elevated blood sugars. The patient states that he has had issues with these over the last few days, and has a history of the same. He is taking his medications as prescribed, has not had any changes. He has no fever, no vomiting, but does admit to some mild nausea. He has no current abdominal pain, no chest pain, no shortness of breath. He was over his primary care doctor's office getting evaluated and getting a lab slip when it was noted that his sugar was in the 500s. He was then sent here for evaluation.    PAST MEDICAL HISTORY   has a past medical history of Anxiety; ASTHMA; Bipolar 1 disorder (CMS-HCC); Depression; Fall; Glaucoma; Glaucoma (1982); Hypothyroidism; Indigestion; Mental disorder; Murmur; Pneumonia; Psychiatric problem (2002); S/P thyroidectomy; Seizure (CMS-HCC) (2010); Seizure disorder (CMS-HCC); and Unspecified disorder of thyroid.    SOCIAL HISTORY  Social History     Social History Main Topics   • Smoking status: Current Every Day Smoker     Packs/day: 0.25     Years: 4.00     Types: Cigarettes     Last attempt to quit: 1/1/2014   • Smokeless tobacco: Never Used   • Alcohol use No   • Drug use: Yes     Types: Inhaled      Comment: marijuana   • Sexual activity: Not on file       SURGICAL HISTORY   has a past surgical history that includes eye surgery; thyroid lobectomy; and other.    CURRENT MEDICATIONS  Home Medications    **Home medications have not yet been reviewed for this encounter**         ALLERGIES  Allergies   Allergen Reactions   • Abilify Unspecified     \"Feeling tired, like I don't even know whats going on around me\"   • Fish      Pt reports fish causes him to be sick to his stomach         REVIEW OF SYSTEMS  See HPI for further details. " Review of systems as above, otherwise all other systems are negative.     PHYSICAL EXAM  Constitutional: Well developed, well nourished. No acute distress.  HEENT: Normocephalic, atraumatic. 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 EOMI.  Neck: Supple, Full range of motion, nontender.  Chest/Pulmonary: clear to ausculation. Symmetrical expansion.   Cardio: Regular rate and rhythm with no murmur.   Abdomen: Soft, nontender. No peritoneal signs. No guarding. No palpable masses.  Musculoskeletal: No deformity, no edema, neurovascular intact.   Neuro: Clear speech, appropriate, cooperative, cranial nerves II-XII grossly intact.  Psych: Normal mood and affect    Results for orders placed or performed during the hospital encounter of 03/09/18   CBC WITH DIFFERENTIAL   Result Value Ref Range    WBC 8.3 4.8 - 10.8 K/uL    RBC 4.50 (L) 4.70 - 6.10 M/uL    Hemoglobin 14.1 14.0 - 18.0 g/dL    Hematocrit 41.9 (L) 42.0 - 52.0 %    MCV 93.1 81.4 - 97.8 fL    MCH 31.3 27.0 - 33.0 pg    MCHC 33.7 33.7 - 35.3 g/dL    RDW 44.6 35.9 - 50.0 fL    Platelet Count 255 164 - 446 K/uL    MPV 9.9 9.0 - 12.9 fL    Neutrophils-Polys 53.60 44.00 - 72.00 %    Lymphocytes 32.50 22.00 - 41.00 %    Monocytes 9.50 0.00 - 13.40 %    Eosinophils 1.70 0.00 - 6.90 %    Basophils 1.10 0.00 - 1.80 %    Immature Granulocytes 1.60 (H) 0.00 - 0.90 %    Nucleated RBC 0.00 /100 WBC    Neutrophils (Absolute) 4.47 1.82 - 7.42 K/uL    Lymphs (Absolute) 2.71 1.00 - 4.80 K/uL    Monos (Absolute) 0.79 0.00 - 0.85 K/uL    Eos (Absolute) 0.14 0.00 - 0.51 K/uL    Baso (Absolute) 0.09 0.00 - 0.12 K/uL    Immature Granulocytes (abs) 0.13 (H) 0.00 - 0.11 K/uL    NRBC (Absolute) 0.00 K/uL   COMP METABOLIC PANEL   Result Value Ref Range    Sodium 135 135 - 145 mmol/L    Potassium 3.9 3.6 - 5.5 mmol/L    Chloride 99 96 - 112 mmol/L    Co2 27 20 - 33 mmol/L    Anion Gap 9.0 0.0 - 11.9    Glucose 342 (H) 65 - 99 mg/dL    Bun 15 8 - 22 mg/dL     Creatinine 0.89 0.50 - 1.40 mg/dL    Calcium 9.1 8.5 - 10.5 mg/dL    AST(SGOT) 18 12 - 45 U/L    ALT(SGPT) 15 2 - 50 U/L    Alkaline Phosphatase 60 30 - 99 U/L    Total Bilirubin 0.6 0.1 - 1.5 mg/dL    Albumin 4.4 3.2 - 4.9 g/dL    Total Protein 6.9 6.0 - 8.2 g/dL    Globulin 2.5 1.9 - 3.5 g/dL    A-G Ratio 1.8 g/dL   ESTIMATED GFR   Result Value Ref Range    GFR If African American >60 >60 mL/min/1.73 m 2    GFR If Non African American >60 >60 mL/min/1.73 m 2   ACCU-CHEK GLUCOSE   Result Value Ref Range    Glucose - Accu-Ck 360 (H) 65 - 99 mg/dL   ACCU-CHEK GLUCOSE   Result Value Ref Range    Glucose - Accu-Ck 288 (H) 65 - 99 mg/dL   POC UA   Result Value Ref Range    POC Color Yellow     POC Appearance Clear     POC Glucose 500 (A) Negative mg/dL    POC Ketones 15 (A) Negative mg/dL    POC Specific Gravity 1.020 1.005 - 1.030    POC Blood Negative Negative    POC Urine PH 5.5 5.0 - 8.0    POC Protein Negative Negative mg/dL    POC Nitrites Negative Negative    POC Leukocyte Esterase Negative Negative     No orders to display         PROCEDURES     MEDICAL RECORD  I have reviewed patient's medical record and pertinent results are listed above.    COURSE & MEDICAL DECISION MAKING  I have reviewed any medical record information, laboratory studies and radiographic results as noted above.    10:37 PM  The patient has no vomiting, no fevers, and no abdominal pain. He states his nausea is under control, and he admits to having this in the past, when his blood sugars are elevated. He has been given food here and is tolerated without difficulty, and his blood sugar is coming down.  At this time he has no signs of DKA.   He is nontoxic-appearing, afebrile, and agrees to follow-up.    IV fluids were given for dry mucous membranes, and hyperglycemia.    If you have had any blood pressure issues while here in the emergency department, please see your doctor for a further evaluation or work up.    Differential diagnoses  include but not limited to: Hyperglycemia, DKA, gastroenteritis, viral illness    This patient presents with hyperglycemia .  At this time, I have counseled the patient/family regarding their medications, pain control, and follow up.  They will continue their medications, if any, as prescribed.  They will return immediately for any worsening symptoms and/or any other medical concerns.  They will see their doctor, or contact the doctor provided, in 1-2 days for follow up.       FINAL IMPRESSION  1. Hyperglycemia            Electronically signed by: Candido Villar, 3/9/2018 9:40 PM

## 2018-04-10 ENCOUNTER — HOSPITAL ENCOUNTER (EMERGENCY)
Facility: MEDICAL CENTER | Age: 38
End: 2018-04-10
Attending: EMERGENCY MEDICINE
Payer: MEDICAID

## 2018-04-10 ENCOUNTER — APPOINTMENT (OUTPATIENT)
Dept: RADIOLOGY | Facility: MEDICAL CENTER | Age: 38
End: 2018-04-10
Attending: EMERGENCY MEDICINE
Payer: MEDICAID

## 2018-04-10 VITALS
SYSTOLIC BLOOD PRESSURE: 112 MMHG | OXYGEN SATURATION: 97 % | TEMPERATURE: 97.7 F | BODY MASS INDEX: 34.61 KG/M2 | HEIGHT: 78 IN | WEIGHT: 299.16 LBS | RESPIRATION RATE: 16 BRPM | DIASTOLIC BLOOD PRESSURE: 82 MMHG | HEART RATE: 63 BPM

## 2018-04-10 DIAGNOSIS — K63.89 EPIPLOIC APPENDAGITIS: ICD-10-CM

## 2018-04-10 DIAGNOSIS — K52.9 GASTROENTERITIS: ICD-10-CM

## 2018-04-10 LAB
ALBUMIN SERPL BCP-MCNC: 4.6 G/DL (ref 3.2–4.9)
ALBUMIN/GLOB SERPL: 1.5 G/DL
ALP SERPL-CCNC: 63 U/L (ref 30–99)
ALT SERPL-CCNC: 24 U/L (ref 2–50)
ANION GAP SERPL CALC-SCNC: 12 MMOL/L (ref 0–11.9)
APPEARANCE UR: CLEAR
AST SERPL-CCNC: 22 U/L (ref 12–45)
BASOPHILS # BLD AUTO: 0.7 % (ref 0–1.8)
BASOPHILS # BLD: 0.08 K/UL (ref 0–0.12)
BILIRUB SERPL-MCNC: 0.5 MG/DL (ref 0.1–1.5)
BUN SERPL-MCNC: 20 MG/DL (ref 8–22)
CALCIUM SERPL-MCNC: 9.4 MG/DL (ref 8.5–10.5)
CHLORIDE SERPL-SCNC: 101 MMOL/L (ref 96–112)
CO2 SERPL-SCNC: 22 MMOL/L (ref 20–33)
COLOR UR AUTO: YELLOW
CREAT SERPL-MCNC: 0.9 MG/DL (ref 0.5–1.4)
EOSINOPHIL # BLD AUTO: 0.19 K/UL (ref 0–0.51)
EOSINOPHIL NFR BLD: 1.7 % (ref 0–6.9)
ERYTHROCYTE [DISTWIDTH] IN BLOOD BY AUTOMATED COUNT: 42.5 FL (ref 35.9–50)
GLOBULIN SER CALC-MCNC: 3 G/DL (ref 1.9–3.5)
GLUCOSE SERPL-MCNC: 209 MG/DL (ref 65–99)
GLUCOSE UR QL STRIP.AUTO: NEGATIVE MG/DL
HCT VFR BLD AUTO: 41.8 % (ref 42–52)
HGB BLD-MCNC: 14 G/DL (ref 14–18)
IMM GRANULOCYTES # BLD AUTO: 0.22 K/UL (ref 0–0.11)
IMM GRANULOCYTES NFR BLD AUTO: 2 % (ref 0–0.9)
KETONES UR QL STRIP.AUTO: ABNORMAL MG/DL
LEUKOCYTE ESTERASE UR QL STRIP.AUTO: NEGATIVE
LIPASE SERPL-CCNC: 10 U/L (ref 11–82)
LYMPHOCYTES # BLD AUTO: 3.19 K/UL (ref 1–4.8)
LYMPHOCYTES NFR BLD: 28.5 % (ref 22–41)
MCH RBC QN AUTO: 31.3 PG (ref 27–33)
MCHC RBC AUTO-ENTMCNC: 33.5 G/DL (ref 33.7–35.3)
MCV RBC AUTO: 93.3 FL (ref 81.4–97.8)
MONOCYTES # BLD AUTO: 1.03 K/UL (ref 0–0.85)
MONOCYTES NFR BLD AUTO: 9.2 % (ref 0–13.4)
NEUTROPHILS # BLD AUTO: 6.49 K/UL (ref 1.82–7.42)
NEUTROPHILS NFR BLD: 57.9 % (ref 44–72)
NITRITE UR QL STRIP.AUTO: NEGATIVE
NRBC # BLD AUTO: 0 K/UL
NRBC BLD-RTO: 0 /100 WBC
PH UR STRIP.AUTO: 5.5 [PH]
PLATELET # BLD AUTO: 312 K/UL (ref 164–446)
PMV BLD AUTO: 9.9 FL (ref 9–12.9)
POTASSIUM SERPL-SCNC: 4.4 MMOL/L (ref 3.6–5.5)
PROT SERPL-MCNC: 7.6 G/DL (ref 6–8.2)
PROT UR QL STRIP: NEGATIVE MG/DL
RBC # BLD AUTO: 4.48 M/UL (ref 4.7–6.1)
RBC UR QL AUTO: NEGATIVE
SODIUM SERPL-SCNC: 135 MMOL/L (ref 135–145)
SP GR UR: 1.02
WBC # BLD AUTO: 11.2 K/UL (ref 4.8–10.8)

## 2018-04-10 PROCEDURE — 700111 HCHG RX REV CODE 636 W/ 250 OVERRIDE (IP): Performed by: EMERGENCY MEDICINE

## 2018-04-10 PROCEDURE — 700105 HCHG RX REV CODE 258: Performed by: EMERGENCY MEDICINE

## 2018-04-10 PROCEDURE — 83690 ASSAY OF LIPASE: CPT

## 2018-04-10 PROCEDURE — 81002 URINALYSIS NONAUTO W/O SCOPE: CPT

## 2018-04-10 PROCEDURE — 85025 COMPLETE CBC W/AUTO DIFF WBC: CPT

## 2018-04-10 PROCEDURE — 96361 HYDRATE IV INFUSION ADD-ON: CPT

## 2018-04-10 PROCEDURE — 74176 CT ABD & PELVIS W/O CONTRAST: CPT

## 2018-04-10 PROCEDURE — 96374 THER/PROPH/DIAG INJ IV PUSH: CPT

## 2018-04-10 PROCEDURE — 80053 COMPREHEN METABOLIC PANEL: CPT

## 2018-04-10 PROCEDURE — 99285 EMERGENCY DEPT VISIT HI MDM: CPT

## 2018-04-10 PROCEDURE — 700111 HCHG RX REV CODE 636 W/ 250 OVERRIDE (IP)

## 2018-04-10 RX ORDER — ONDANSETRON 4 MG/1
4 TABLET, ORALLY DISINTEGRATING ORAL EVERY 6 HOURS PRN
Qty: 20 TAB | Refills: 0 | Status: SHIPPED | OUTPATIENT
Start: 2018-04-10 | End: 2018-05-11

## 2018-04-10 RX ORDER — KETOROLAC TROMETHAMINE 30 MG/ML
INJECTION, SOLUTION INTRAMUSCULAR; INTRAVENOUS
Status: COMPLETED
Start: 2018-04-10 | End: 2018-04-10

## 2018-04-10 RX ORDER — KETOROLAC TROMETHAMINE 30 MG/ML
30 INJECTION, SOLUTION INTRAMUSCULAR; INTRAVENOUS ONCE
Status: COMPLETED | OUTPATIENT
Start: 2018-04-10 | End: 2018-04-10

## 2018-04-10 RX ORDER — NAPROXEN 500 MG/1
500 TABLET ORAL 2 TIMES DAILY WITH MEALS
Qty: 60 TAB | Refills: 0 | Status: ON HOLD | OUTPATIENT
Start: 2018-04-10 | End: 2018-05-16

## 2018-04-10 RX ORDER — SODIUM CHLORIDE 9 MG/ML
1000 INJECTION, SOLUTION INTRAVENOUS CONTINUOUS
Status: ACTIVE | OUTPATIENT
Start: 2018-04-10 | End: 2018-04-10

## 2018-04-10 RX ORDER — ONDANSETRON 4 MG/1
4 TABLET, ORALLY DISINTEGRATING ORAL ONCE
Status: COMPLETED | OUTPATIENT
Start: 2018-04-10 | End: 2018-04-10

## 2018-04-10 RX ADMIN — KETOROLAC TROMETHAMINE 30 MG: 30 INJECTION, SOLUTION INTRAMUSCULAR; INTRAVENOUS at 17:04

## 2018-04-10 RX ADMIN — SODIUM CHLORIDE 1000 ML: 9 INJECTION, SOLUTION INTRAVENOUS at 17:04

## 2018-04-10 RX ADMIN — KETOROLAC TROMETHAMINE 30 MG: 30 INJECTION, SOLUTION INTRAMUSCULAR at 17:04

## 2018-04-10 RX ADMIN — ONDANSETRON 4 MG: 4 TABLET, ORALLY DISINTEGRATING ORAL at 17:04

## 2018-04-10 NOTE — ED TRIAGE NOTES
Chief Complaint   Patient presents with   • Fatigue     and weakness   • Vomiting     Ambulatory to triage for above. VSS. Explained triage process, to waiting room. Asked to inform RN if questions or concerns arise.

## 2018-04-10 NOTE — ED PROVIDER NOTES
ED Provider Note    Scribed for Nirmala Flanagan M.D. by Lamar Linares. 4/10/2018  4:52 PM    Primary care provider: ANIKET Chung  Means of arrival: Walk-in  History obtained from: Patient  History limited by: None     CHIEF COMPLAINT  Chief Complaint   Patient presents with   • Fatigue     and weakness   • Vomiting     HPI  Norm Prabhakar is a 37 y.o. male who presents to the Emergency Department for evaluation of fatigue, nausea, vomiting, and diarrhea onset three days ago. Patient reports he has had several episodes of emesis and diarrhea since onset of symptoms. He states associated diffuse abdominal pain. Denies fever, cough, congestion, dysuria, hematuria, or hematemesis. Patient reports he may have ate bad chicken yesterday. Denies recent antibiotics, travel out of the country, or new medications. The patient reports history of glaucoma, asthma, and seizures. Patient reports he is a current everyday cigarette smoker and uses marijuana.     REVIEW OF SYSTEMS  HEENT:  No ear pain, congestion, or sore throat   EYES: no discharge, redness, or vision changes  CARDIAC: no chest pain, no palpitations    PULMONARY: no dyspnea, cough, or congestion   GI: Vomiting, diarrhea, and abdominal pain   : no dysuria, back pain, or hematuria   Neuro: Weakness, no numbness, aphasia, or headache  Musculoskeletal: no swelling, deformity, pain, or joint swelling  Endocrine: no fevers, sweating, or weight loss   SKIN: no rash, erythema, or contusions     See history of present illness. All other systems are negative C    PAST MEDICAL HISTORY   has a past medical history of Anxiety; ASTHMA; Bipolar 1 disorder (CMS-HCC); Depression; Fall; Glaucoma; Glaucoma (1982); Hypothyroidism; Indigestion; Mental disorder; Murmur; Pneumonia; Psychiatric problem (2002); S/P thyroidectomy; Seizure (CMS-HCC) (2010); Seizure disorder (CMS-HCC); and Unspecified disorder of thyroid.    SURGICAL HISTORY   has a past surgical history  "that includes eye surgery; thyroid lobectomy; and other.    SOCIAL HISTORY  Social History   Substance Use Topics   • Smoking status: Current Every Day Smoker     Packs/day: 0.25     Years: 4.00     Types: Cigarettes     Last attempt to quit: 1/1/2014   • Smokeless tobacco: Never Used   • Alcohol use No      History   Drug Use   • Types: Inhaled     Comment: marijuana     FAMILY HISTORY  Family History   Problem Relation Age of Onset   • Hypertension Mother    • Heart Disease Mother    • Lung Disease Mother    • Stroke Maternal Grandmother      CURRENT MEDICATIONS  Home Medications     Reviewed by Sarah Moreno R.N. (Registered Nurse) on 04/10/18 at 1641  Med List Status: Partial   Medication Last Dose Status   divalproex (DEPAKOTE) 500 MG Tablet Delayed Response 2/1/2018 Active   glimepiride (AMARYL) 1 MG tablet  Active   glucose 4 g 4 g Chew Tab  Active   Insulin Glargine (BASAGLAR KWIKPEN SC)  Active   latanoprost (XALATAN) 0.005 % Solution 1/31/2018 Active   levothyroxine (SYNTHROID) 125 MCG Tab 2/1/2018 Active   metFORMIN (GLUCOPHAGE) 1000 MG tablet  Active   ofloxacin otic sol (FLOXIN OTIC) 0.3 % Solution  Active   ondansetron (ZOFRAN ODT) 4 MG TABLET DISPERSIBLE  Active   sertraline (ZOLOFT) 100 MG Tab 2/1/2018 Active              ALLERGIES  Allergies   Allergen Reactions   • Abilify Unspecified     \"Feeling tired, like I don't even know whats going on around me\"   • Fish      Pt reports fish causes him to be sick to his stomach       PHYSICAL EXAM  VITAL SIGNS: /86   Pulse 82   Temp 36.3 °C (97.3 °F)   Resp 16   Ht 1.981 m (6' 6\")   Wt (!) 135.7 kg (299 lb 2.6 oz)   SpO2 95%   BMI 34.57 kg/m²     Constitutional: Well developed, Well nourished, No acute distress, Non-toxic appearance.   HEENT: Normocephalic, Atraumatic,  external ears normal, pharynx pink,  Mucous  Membranes moist, No rhinorrhea or mucosal edema  Eyes: PERRL, EOMI, No discharge. Left eye mildly injected.  Neck: Normal range " of motion, No tenderness, Supple, No stridor.   Lymphatic: No lymphadenopathy    Cardiovascular: Regular Rate and Rhythm, No murmurs,  rubs, or gallops.   Thorax & Lungs: Lungs clear to auscultation bilaterally, No respiratory distress, No wheezes, rhales or rhonchi, No chest wall tenderness.   Abdomen: Bowel sounds normal, Soft, mild diffuse tenderness, non distended,  No pulsatile masses., no rebound guarding or peritoneal signs.   Skin: Warm, Dry, No erythema, No rash,   Back:  No CVA tenderness,  No spinal tenderness, bony crepitance, step offs, or instability.   Neurologic: Alert & oriented x 3, Normal motor function, Normal sensory function, No focal deficits noted. Normal reflexes. Normal Cranial Nerves.  Extremities: Equal, intact distal pulses, No cyanosis, clubbing or edema,  No tenderness.   Musculoskeletal: Good range of motion in all major joints. No tenderness to palpation or major deformities noted.     DIAGNOSTIC STUDIES / PROCEDURES    LABS  Results for orders placed or performed during the hospital encounter of 04/10/18   CBC WITH DIFFERENTIAL   Result Value Ref Range    WBC 11.2 (H) 4.8 - 10.8 K/uL    RBC 4.48 (L) 4.70 - 6.10 M/uL    Hemoglobin 14.0 14.0 - 18.0 g/dL    Hematocrit 41.8 (L) 42.0 - 52.0 %    MCV 93.3 81.4 - 97.8 fL    MCH 31.3 27.0 - 33.0 pg    MCHC 33.5 (L) 33.7 - 35.3 g/dL    RDW 42.5 35.9 - 50.0 fL    Platelet Count 312 164 - 446 K/uL    MPV 9.9 9.0 - 12.9 fL    Neutrophils-Polys 57.90 44.00 - 72.00 %    Lymphocytes 28.50 22.00 - 41.00 %    Monocytes 9.20 0.00 - 13.40 %    Eosinophils 1.70 0.00 - 6.90 %    Basophils 0.70 0.00 - 1.80 %    Immature Granulocytes 2.00 (H) 0.00 - 0.90 %    Nucleated RBC 0.00 /100 WBC    Neutrophils (Absolute) 6.49 1.82 - 7.42 K/uL    Lymphs (Absolute) 3.19 1.00 - 4.80 K/uL    Monos (Absolute) 1.03 (H) 0.00 - 0.85 K/uL    Eos (Absolute) 0.19 0.00 - 0.51 K/uL    Baso (Absolute) 0.08 0.00 - 0.12 K/uL    Immature Granulocytes (abs) 0.22 (H) 0.00 - 0.11  K/uL    NRBC (Absolute) 0.00 K/uL   COMP METABOLIC PANEL   Result Value Ref Range    Sodium 135 135 - 145 mmol/L    Potassium 4.4 3.6 - 5.5 mmol/L    Chloride 101 96 - 112 mmol/L    Co2 22 20 - 33 mmol/L    Anion Gap 12.0 (H) 0.0 - 11.9    Glucose 209 (H) 65 - 99 mg/dL    Bun 20 8 - 22 mg/dL    Creatinine 0.90 0.50 - 1.40 mg/dL    Calcium 9.4 8.5 - 10.5 mg/dL    AST(SGOT) 22 12 - 45 U/L    ALT(SGPT) 24 2 - 50 U/L    Alkaline Phosphatase 63 30 - 99 U/L    Total Bilirubin 0.5 0.1 - 1.5 mg/dL    Albumin 4.6 3.2 - 4.9 g/dL    Total Protein 7.6 6.0 - 8.2 g/dL    Globulin 3.0 1.9 - 3.5 g/dL    A-G Ratio 1.5 g/dL   LIPASE   Result Value Ref Range    Lipase 10 (L) 11 - 82 U/L   ESTIMATED GFR   Result Value Ref Range    GFR If African American >60 >60 mL/min/1.73 m 2    GFR If Non African American >60 >60 mL/min/1.73 m 2   POC UA   Result Value Ref Range    POC Color Yellow     POC Appearance Clear     POC Glucose Negative Negative mg/dL    POC Ketones Trace (A) Negative mg/dL    POC Specific Gravity 1.025 1.005 - 1.030    POC Blood Negative Negative    POC Urine PH 5.5 5.0 - 8.0    POC Protein Negative Negative mg/dL    POC Nitrites Negative Negative    POC Leukocyte Esterase Negative Negative       All labs reviewed by me.    RADIOLOGY  CT-RENAL COLIC EVALUATION(A/P W/O)   Final Result      Fat density lesion adjacent to the proximal transverse colon measuring 3.4 x 1.5 cm with associated stranding. This may be related to epiploic appendagitis.      Hepatomegaly and hepatic steatosis.      Splenomegaly.      Enlarged inguinal lymph nodes are again noted. These are nonspecific and may be reactive.      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           The radiologist's interpretation of all radiological studies have been reviewed by me.    COURSE & MEDICAL DECISION MAKING  Nursing notes, VS, PMSFHx reviewed in chart.    4:52 PM - Patient seen and examined at bedside. Patient  will be treated with Ketorolac Tromethamine 30 mg/mL injection solution, ondansetron 4 mg, ketorolac 30 mg. He will also be treated with NS infusion 1,000 mL for possible dehydration.  Ordered CT-renal Colic, Estimated GFR, CBC with differential, CMP, Lipase, POC Urinalysis to evaluate his symptoms. The differential diagnoses include but are not limited to: Dehydration, gastroenteritis     6:03 PM - Recheck: Patient is resting comfortably and reports feeling improved. I updated her on her results. I explained that he is now stable for discharge with a prescription for Zofran and Naprosyn. I advised him to follow up with his primary care provider and to return to the ED for worsening or new onset of symptoms. He understands and will comply.     DISPOSITION:  Patient will be discharged home in stable condition.    FOLLOW UP:  ANIKET Chung  [address]  [facility] [address] [phone]    Call in 1 day  for recheck, As needed, If symptoms worsen    OUTPATIENT MEDICATIONS:  Discharge Medication List as of 4/10/2018  5:58 PM      START taking these medications    Details   naproxen (NAPROSYN) 500 MG Tab Take 1 Tab by mouth 2 times a day, with meals., Disp-60 Tab, R-0, Print Rx Paper      ondansetron (ZOFRAN ODT) 4 MG TABLET DISPERSIBLE Take 1 Tab by mouth every 6 hours as needed for Nausea., Disp-20 Tab, R-0, Print Rx Paper             FINAL IMPRESSION  1. Gastroenteritis    2. Epiploic appendagitis          Lamar ROB (Robertibe), am scribing for, and in the presence of, Nirmala Flanagan M.D..    Electronically signed by: Lamar Linares (Scribe), 4/10/2018    Nirmala ROB M.D. personally performed the services described in this documentation, as scribed by Lamar Linares in my presence, and it is both accurate and complete.    The note accurately reflects work and decisions made by me.  Nirmala Flanagan  4/10/2018  6:34 PM

## 2018-04-11 ENCOUNTER — PATIENT OUTREACH (OUTPATIENT)
Dept: HEALTH INFORMATION MANAGEMENT | Facility: OTHER | Age: 38
End: 2018-04-11

## 2018-04-11 NOTE — PROGRESS NOTES
Placed discharge outreach phone call to patient s/p ER discharge 4/10/18.  Left voicemail providing my contact information and instructions to call with any questions or concerns.

## 2018-04-11 NOTE — ED NOTES
"Pt brought back from [facility], ambulatory with steady gait.     C/o LUQ pain x 2 days. States pain feels like \"you are ringing out a wet rag.\"  Pt also states nausea with several episodes of vomiting/diarrhea. Denies any blood in stool/emesis. Pt denies any urinary symptoms. Denies fevers.     A/o x4, speaking in full sentences.   "

## 2018-04-11 NOTE — DISCHARGE INSTRUCTIONS
Food Choices to Help Relieve Diarrhea, Adult  When you have diarrhea, the foods you eat and your eating habits are very important. Choosing the right foods and drinks can help relieve diarrhea. Also, because diarrhea can last up to 7 days, you need to replace lost fluids and electrolytes (such as sodium, potassium, and chloride) in order to help prevent dehydration.   WHAT GENERAL GUIDELINES DO I NEED TO FOLLOW?  · Slowly drink 1 cup (8 oz) of fluid for each episode of diarrhea. If you are getting enough fluid, your urine will be clear or pale yellow.  · Eat starchy foods. Some good choices include white rice, white toast, pasta, low-fiber cereal, baked potatoes (without the skin), saltine crackers, and bagels.  · Avoid large servings of any cooked vegetables.  · Limit fruit to two servings per day. A serving is ½ cup or 1 small piece.  · Choose foods with less than 2 g of fiber per serving.  · Limit fats to less than 8 tsp (38 g) per day.  · Avoid fried foods.  · Eat foods that have probiotics in them. Probiotics can be found in certain dairy products.  · Avoid foods and beverages that may increase the speed at which food moves through the stomach and intestines (gastrointestinal tract). Things to avoid include:  ¨ High-fiber foods, such as dried fruit, raw fruits and vegetables, nuts, seeds, and whole grain foods.  ¨ Spicy foods and high-fat foods.  ¨ Foods and beverages sweetened with high-fructose corn syrup, honey, or sugar alcohols such as xylitol, sorbitol, and mannitol.  WHAT FOODS ARE RECOMMENDED?  Grains  White rice. White, Filipino, or keyshawn breads (fresh or toasted), including plain rolls, buns, or bagels. White pasta. Saltine, soda, or ha crackers. Pretzels. Low-fiber cereal. Cooked cereals made with water (such as cornmeal, farina, or cream cereals). Plain muffins. Matzo. [place] toast. Zwieback.   Vegetables  Potatoes (without the skin). Strained tomato and vegetable juices. Most well-cooked and canned  vegetables without seeds. Tender lettuce.  Fruits  Cooked or canned applesauce, apricots, cherries, fruit cocktail, grapefruit, peaches, pears, or plums. Fresh bananas, apples without skin, cherries, grapes, cantaloupe, grapefruit, peaches, oranges, or plums.   Meat and Other Protein Products  Baked or boiled chicken. Eggs. Tofu. Fish. Seafood. Smooth peanut butter. Ground or well-cooked tender beef, ham, veal, lamb, pork, or poultry.   Dairy  Plain yogurt, kefir, and unsweetened liquid yogurt. Lactose-free milk, buttermilk, or soy milk. Plain hard cheese.  Beverages  Sport drinks. Clear broths. Diluted fruit juices (except prune). Regular, caffeine-free sodas such as ginger ale. Water. Decaffeinated teas. Oral rehydration solutions. Sugar-free beverages not sweetened with sugar alcohols.  Other  Bouillon, broth, or soups made from recommended foods.   The items listed above may not be a complete list of recommended foods or beverages. Contact your dietitian for more options.  WHAT FOODS ARE NOT RECOMMENDED?  Grains  Whole grain, whole wheat, bran, or rye breads, rolls, pastas, crackers, and cereals. Wild or brown rice. Cereals that contain more than 2 g of fiber per serving. Corn tortillas or taco shells. Cooked or dry oatmeal. Granola. Popcorn.  Vegetables  Raw vegetables. Cabbage, broccoli, [place] sprouts, artichokes, baked beans, beet greens, corn, kale, legumes, peas, sweet potatoes, and yams. Potato skins. Cooked spinach and cabbage.  Fruits  Dried fruit, including raisins and dates. Raw fruits. Stewed or dried prunes. Fresh apples with skin, apricots, mangoes, pears, raspberries, and strawberries.   Meat and Other Protein Products  [place] peanut butter. Nuts and seeds. Beans and lentils. Onofre.   Dairy  High-fat cheeses. Milk, chocolate milk, and beverages made with milk, such as milk shakes. Cream. Ice cream.  Sweets and Desserts  Sweet rolls, doughnuts, and sweet breads. Pancakes and waffles.  Fats and  Oils  Butter. Cream sauces. Margarine. Salad oils. Plain salad dressings. Olives. Avocados.   Beverages  Caffeinated beverages (such as coffee, tea, soda, or energy drinks). Alcoholic beverages. Fruit juices with pulp. Prune juice. Soft drinks sweetened with high-fructose corn syrup or sugar alcohols.  Other  Coconut. Hot sauce. Chili powder. Mayonnaise. Gravy. Cream-based or milk-based soups.   The items listed above may not be a complete list of foods and beverages to avoid. Contact your dietitian for more information.  WHAT SHOULD I DO IF I BECOME DEHYDRATED?  Diarrhea can sometimes lead to dehydration. Signs of dehydration include dark urine and dry mouth and skin. If you think you are dehydrated, you should rehydrate with an oral rehydration solution. These solutions can be purchased at pharmacies, retail stores, or online.   Drink ½-1 cup (120-240 mL) of oral rehydration solution each time you have an episode of diarrhea. If drinking this amount makes your diarrhea worse, try drinking smaller amounts more often. For example, drink 1-3 tsp (5-15 mL) every 5-10 minutes.   A general rule for staying hydrated is to drink 1½-2 L of fluid per day. Talk to your health care provider about the specific amount you should be drinking each day. Drink enough fluids to keep your urine clear or pale yellow.     This information is not intended to replace advice given to you by your health care provider. Make sure you discuss any questions you have with your health care provider.     Document Released: 03/09/2005 Document Revised: 01/08/2016 Document Reviewed: 11/10/2014  [employer] Interactive Patient Education ©2016 [employer]

## 2018-04-22 ENCOUNTER — APPOINTMENT (OUTPATIENT)
Dept: RADIOLOGY | Facility: MEDICAL CENTER | Age: 38
End: 2018-04-22
Attending: EMERGENCY MEDICINE
Payer: MEDICAID

## 2018-04-22 ENCOUNTER — HOSPITAL ENCOUNTER (EMERGENCY)
Facility: MEDICAL CENTER | Age: 38
End: 2018-04-22
Attending: EMERGENCY MEDICINE
Payer: MEDICAID

## 2018-04-22 VITALS
TEMPERATURE: 97.9 F | BODY MASS INDEX: 33.54 KG/M2 | SYSTOLIC BLOOD PRESSURE: 132 MMHG | OXYGEN SATURATION: 95 % | WEIGHT: 289.9 LBS | HEIGHT: 78 IN | HEART RATE: 69 BPM | DIASTOLIC BLOOD PRESSURE: 69 MMHG | RESPIRATION RATE: 19 BRPM

## 2018-04-22 DIAGNOSIS — R56.9 SEIZURE (HCC): ICD-10-CM

## 2018-04-22 LAB
ANION GAP SERPL CALC-SCNC: 11 MMOL/L (ref 0–11.9)
BASOPHILS # BLD AUTO: 0.8 % (ref 0–1.8)
BASOPHILS # BLD: 0.09 K/UL (ref 0–0.12)
BUN SERPL-MCNC: 27 MG/DL (ref 8–22)
CALCIUM SERPL-MCNC: 9.7 MG/DL (ref 8.5–10.5)
CHLORIDE SERPL-SCNC: 100 MMOL/L (ref 96–112)
CO2 SERPL-SCNC: 24 MMOL/L (ref 20–33)
CREAT SERPL-MCNC: 1.05 MG/DL (ref 0.5–1.4)
EOSINOPHIL # BLD AUTO: 0.08 K/UL (ref 0–0.51)
EOSINOPHIL NFR BLD: 0.7 % (ref 0–6.9)
ERYTHROCYTE [DISTWIDTH] IN BLOOD BY AUTOMATED COUNT: 43.5 FL (ref 35.9–50)
GLUCOSE SERPL-MCNC: 92 MG/DL (ref 65–99)
HCT VFR BLD AUTO: 42.7 % (ref 42–52)
HGB BLD-MCNC: 14.4 G/DL (ref 14–18)
IMM GRANULOCYTES # BLD AUTO: 0.13 K/UL (ref 0–0.11)
IMM GRANULOCYTES NFR BLD AUTO: 1.2 % (ref 0–0.9)
LYMPHOCYTES # BLD AUTO: 2.96 K/UL (ref 1–4.8)
LYMPHOCYTES NFR BLD: 27.1 % (ref 22–41)
MCH RBC QN AUTO: 31.6 PG (ref 27–33)
MCHC RBC AUTO-ENTMCNC: 33.7 G/DL (ref 33.7–35.3)
MCV RBC AUTO: 93.6 FL (ref 81.4–97.8)
MONOCYTES # BLD AUTO: 1.04 K/UL (ref 0–0.85)
MONOCYTES NFR BLD AUTO: 9.5 % (ref 0–13.4)
NEUTROPHILS # BLD AUTO: 6.63 K/UL (ref 1.82–7.42)
NEUTROPHILS NFR BLD: 60.7 % (ref 44–72)
NRBC # BLD AUTO: 0 K/UL
NRBC BLD-RTO: 0 /100 WBC
PLATELET # BLD AUTO: 267 K/UL (ref 164–446)
PMV BLD AUTO: 9.8 FL (ref 9–12.9)
POTASSIUM SERPL-SCNC: 3.8 MMOL/L (ref 3.6–5.5)
RBC # BLD AUTO: 4.56 M/UL (ref 4.7–6.1)
SODIUM SERPL-SCNC: 135 MMOL/L (ref 135–145)
VALPROATE SERPL-MCNC: 80.3 UG/ML (ref 50–100)
WBC # BLD AUTO: 10.9 K/UL (ref 4.8–10.8)

## 2018-04-22 PROCEDURE — 96374 THER/PROPH/DIAG INJ IV PUSH: CPT

## 2018-04-22 PROCEDURE — 700105 HCHG RX REV CODE 258: Performed by: EMERGENCY MEDICINE

## 2018-04-22 PROCEDURE — A9270 NON-COVERED ITEM OR SERVICE: HCPCS | Performed by: EMERGENCY MEDICINE

## 2018-04-22 PROCEDURE — 85025 COMPLETE CBC W/AUTO DIFF WBC: CPT

## 2018-04-22 PROCEDURE — 80164 ASSAY DIPROPYLACETIC ACD TOT: CPT

## 2018-04-22 PROCEDURE — 700111 HCHG RX REV CODE 636 W/ 250 OVERRIDE (IP): Performed by: EMERGENCY MEDICINE

## 2018-04-22 PROCEDURE — 700102 HCHG RX REV CODE 250 W/ 637 OVERRIDE(OP): Performed by: EMERGENCY MEDICINE

## 2018-04-22 PROCEDURE — 70450 CT HEAD/BRAIN W/O DYE: CPT

## 2018-04-22 PROCEDURE — 80048 BASIC METABOLIC PNL TOTAL CA: CPT

## 2018-04-22 PROCEDURE — 99284 EMERGENCY DEPT VISIT MOD MDM: CPT

## 2018-04-22 RX ORDER — ACETAMINOPHEN 325 MG/1
1000 TABLET ORAL ONCE
Status: COMPLETED | OUTPATIENT
Start: 2018-04-22 | End: 2018-04-22

## 2018-04-22 RX ORDER — LEVETIRACETAM 500 MG/1
500 TABLET ORAL 2 TIMES DAILY
Qty: 60 TAB | Refills: 0 | Status: SHIPPED | OUTPATIENT
Start: 2018-04-22 | End: 2018-08-05

## 2018-04-22 RX ORDER — SODIUM CHLORIDE 9 MG/ML
1000 INJECTION, SOLUTION INTRAVENOUS ONCE
Status: COMPLETED | OUTPATIENT
Start: 2018-04-22 | End: 2018-04-22

## 2018-04-22 RX ADMIN — SODIUM CHLORIDE 1000 ML: 9 INJECTION, SOLUTION INTRAVENOUS at 21:23

## 2018-04-22 RX ADMIN — ACETAMINOPHEN 975 MG: 325 TABLET, FILM COATED ORAL at 21:22

## 2018-04-22 RX ADMIN — SODIUM CHLORIDE 1000 MG: 9 INJECTION, SOLUTION INTRAVENOUS at 22:59

## 2018-04-22 ASSESSMENT — PAIN SCALES - GENERAL
PAINLEVEL_OUTOF10: 3
PAINLEVEL_OUTOF10: 8

## 2018-04-22 ASSESSMENT — LIFESTYLE VARIABLES
DO YOU DRINK ALCOHOL: NO
DO YOU DRINK ALCOHOL: NO

## 2018-04-23 ENCOUNTER — PATIENT OUTREACH (OUTPATIENT)
Dept: HEALTH INFORMATION MANAGEMENT | Facility: OTHER | Age: 38
End: 2018-04-23

## 2018-04-23 ASSESSMENT — ENCOUNTER SYMPTOMS
EYE REDNESS: 0
CHILLS: 0
BLURRED VISION: 0
BACK PAIN: 0
VOMITING: 0
SEIZURES: 1
SHORTNESS OF BREATH: 0
FOCAL WEAKNESS: 0
SORE THROAT: 0
NECK PAIN: 0
COUGH: 0
FEVER: 0
HEADACHES: 1
ABDOMINAL PAIN: 0

## 2018-04-23 NOTE — ED PROVIDER NOTES
"ED Provider Note    CHIEF COMPLAINT  Chief Complaint   Patient presents with   • Seizure     Reports 2 seizures today and \"not feeling well\". States his head hurts but isn't sure if he hit it. Hx of seizures, takes depakote as prescribed. Last seizure 2 months ago.    • Head Ache       HPI  Norm Prabhakar is a 37 y.o. Male with history of seizure disorder on depakote, hypothyroidism and bipolar depression, who presents following a seizure today.  Friends reportedly witnessed the seizure and told him he hit his head.  No tongue biting or incontinence.  Endorses he was feeling fine prior to seizure today.  No recent fevers, cough, or dysuria.  Reports a current severe posterior headache with a history of chronic headaches as well.  No radiation of the pain.  No associated neck or back pain.  He states it was 2-3 months ago that he last saw his neurologist, and he can not recall their name.  Reports taking antiepileptics as directed.    REVIEW OF SYSTEMS  See HPI for further details.   Review of Systems   Constitutional: Negative for chills and fever.   HENT: Negative for sore throat.    Eyes: Negative for blurred vision and redness.   Respiratory: Negative for cough and shortness of breath.    Cardiovascular: Negative for chest pain and leg swelling.   Gastrointestinal: Negative for abdominal pain and vomiting.   Genitourinary: Negative for dysuria and urgency.   Musculoskeletal: Negative for back pain and neck pain.   Skin: Negative for rash.   Neurological: Positive for seizures and headaches. Negative for focal weakness.   Psychiatric/Behavioral: Negative for suicidal ideas.         PAST MEDICAL HISTORY   has a past medical history of Anxiety; ASTHMA; Bipolar 1 disorder (CMS-HCC); Depression; Fall; Glaucoma; Glaucoma (1982); Hypothyroidism; Indigestion; Mental disorder; Murmur; Pneumonia; Psychiatric problem (2002); S/P thyroidectomy; Seizure (CMS-HCC) (2010); Seizure disorder (CMS-HCC); and Unspecified disorder " "of thyroid.    SOCIAL HISTORY  Social History     Social History Main Topics   • Smoking status: Current Every Day Smoker     Packs/day: 0.25     Years: 4.00     Types: Cigarettes, Cigars     Last attempt to quit: 1/1/2014   • Smokeless tobacco: Never Used   • Alcohol use No   • Drug use: Yes     Types: Inhaled      Comment: marijuana   • Sexual activity: Not on file       SURGICAL HISTORY   has a past surgical history that includes eye surgery; thyroid lobectomy; and other.    CURRENT MEDICATIONS  Home Medications     Reviewed by Leeroy Bruno R.N. (Registered Nurse) on 04/22/18 at [employer]  Med List Status: Partial   Medication Last Dose Status   divalproex (DEPAKOTE) 500 MG Tablet Delayed Response 4/22/2018 Active   glimepiride (AMARYL) 1 MG tablet  Active   glucose 4 g 4 g Chew Tab  Active   Insulin Glargine (BASAGLAR KWIKPEN SC)  Active   latanoprost (XALATAN) 0.005 % Solution 1/31/2018 Active   levothyroxine (SYNTHROID) 125 MCG Tab 4/21/2018 Active   metFORMIN (GLUCOPHAGE) 1000 MG tablet 4/22/2018 Active   naproxen (NAPROSYN) 500 MG Tab  Active   ofloxacin otic sol (FLOXIN OTIC) 0.3 % Solution  Active   ondansetron (ZOFRAN ODT) 4 MG TABLET DISPERSIBLE  Active   sertraline (ZOLOFT) 100 MG Tab 2/1/2018 Active                ALLERGIES  Allergies   Allergen Reactions   • Abilify Unspecified     \"Feeling tired, like I don't even know whats going on around me\"   • Fish      Pt reports fish causes him to be sick to his stomach         PHYSICAL EXAM   VITAL SIGNS: /69   Pulse 69   Temp 36.6 °C (97.9 °F)   Resp 19   Ht 1.981 m (6' 6\")   Wt (!) 131.5 kg (289 lb 14.5 oz)   SpO2 95%   BMI 33.50 kg/m²      Physical Exam   Constitutional: He is oriented to person, place, and time and well-developed, well-nourished, and in no distress. No distress.   Disheveled, nontoxic appearing   HENT:   Head: Normocephalic and atraumatic.   Eyes: Conjunctivae are normal.   L eye with chronic opacification, R pupil  PERRL "   Neck: Normal range of motion. Neck supple.   No midline C/T/L spine TTP   Cardiovascular: Normal rate, regular rhythm and normal heart sounds.    Pulmonary/Chest: Effort normal and breath sounds normal. No respiratory distress.   Abdominal: Soft. He exhibits no distension. There is no tenderness.   Musculoskeletal: Normal range of motion. He exhibits no edema or tenderness.   Neurological: He is alert and oriented to person, place, and time. No cranial nerve deficit.   Moving all extremities spontaneously  Chronic 4/5 strength in RUE and RLE, sensation intact  Cranial nerves intact   Skin: Skin is warm and dry.   Psychiatric:   Flat affect, normal mood         DIAGNOSTIC STUDIES      LABS  Personally reviewed by me  Labs Reviewed   BASIC METABOLIC PANEL - Abnormal; Notable for the following:        Result Value    Bun 27 (*)     All other components within normal limits   CBC WITH DIFFERENTIAL - Abnormal; Notable for the following:     WBC 10.9 (*)     RBC 4.56 (*)     Immature Granulocytes 1.20 (*)     Monos (Absolute) 1.04 (*)     Immature Granulocytes (abs) 0.13 (*)     All other components within normal limits   VALPROIC ACID   ESTIMATED GFR   URINALYSIS,CULTURE IF INDICATED           RADIOLOGY  Personally reviewed by me  CT-HEAD W/O   Final Result      1. White matter lucencies, again prominent for age but not appreciably changed since prior study.   2. Otherwise, Head CT without contrast with no acute findings. No evidence of acute cerebral infarction, hemorrhage or mass lesion.            ED COURSE  Vitals:    04/22/18 2200 04/22/18 2230 04/22/18 2300 04/22/18 2330   BP:       Pulse: 71 69 73 69   Resp:       Temp:       SpO2: 93% 95% 96% 95%   Weight:       Height:             Medications administered:  Medications   NS infusion 1,000 mL (0 mL Intravenous Stopped 4/22/18 2303)   acetaminophen (TYLENOL) tablet 975 mg (975 mg Oral Given 4/22/18 2122)   levETIRAcetam (KEPPRA) 1,000 mg in  mL IVPB (0 mg  Intravenous Stopped 4/22/18 1723)         Old records personally reviewed:  EEG from admission 9/2017 showed frequent epileptiform discharges arising from the right posterior quadrant, with intermittent slowing in the same region.  Right sided weakness documented at this time        MEDICAL DECISION MAKING  Patient with history of seizures on depakote, chronic right sided weakness with concern for possible conversion disorder who presents after witnessed seizure today with associated head trauma.  He is afebrile on arrival with normal vitals and nontoxic appearing.  Neurologic exam demonstrates chronic right sided weakness without new deficits concerning for CVA.  CTH negative for intracranial hemorrhage or skull fracture.  No other traumatic injuries identified on exam.  Labs without e/o electrolyte abnormalities or acidosis.  No recent infectious symptoms.  Depakote level is therapeutic therefore patient will be treated with Keppra assuming he is having breakthrough seizures.  Patient does not know the name of his neurologist therefore I will send him home with a prescription for Keppra to cover him until he is able to follow up with them.  He understands importance of close follow up and strict return precautions for changing or worsening symptoms.        IMPRESSION  (R56.9) Seizure (CMS-HCC)    Disposition: Discharge home, stable condition  Results, diagnoses, and treatment options were discussed with the patient and/or family. Patient verbalized understanding of plan of care.    Patient referred to primary care provider for monitoring and treatment of blood pressure.      Discharge Medication List as of 4/22/2018 11:34 PM      START taking these medications    Details   levETIRAcetam (KEPPRA) 500 MG Tab Take 1 Tab by mouth 2 times a day., Disp-60 Tab, R-0, Print Rx Paper                 Electronically signed by: Isabelle William, 4/22/2018 8:08 PM

## 2018-04-23 NOTE — ED TRIAGE NOTES
"Chief Complaint   Patient presents with   • Seizure     Reports 2 seizures today and \"not feeling well\". States his head hurts but isn't sure if he hit it. Hx of seizures, takes depakote as prescribed. Last seizure 2 months ago.    • Head Ache     Pt amb to triage with above. Hematoma noted to back of head.   A&O x3. VSS. [facility] Educated on triage process and to inform staff of any changes.     /69   Pulse 88   Temp 36.6 °C (97.9 °F)   Resp 19   Ht 1.981 m (6' 6\")   Wt (!) 131.5 kg (289 lb 14.5 oz)   SpO2 97%   BMI 33.50 kg/m²     "

## 2018-04-23 NOTE — DISCHARGE INSTRUCTIONS
You were seen in the Emergency Department for seizure.    Labs, CT head were completed without significant acute abnormalities.    Please use 1,000mg of tylenol or 600mg of ibuprofen every 6 hours as needed for pain. Please take new seizure medication in addition to your depakote and call your neurologist immediately for follow up.    Please follow up with your primary care physician.    Return to the Emergency Department with fevers, uncontrolled headaches, confusion, or other concerns.      Seizure, Adult  When you have a seizure:  · Parts of your body may move.  · How aware or awake (conscious) you are may change.  · You may shake (convulse).  Some people have symptoms right before a seizure happens. These symptoms may include:  · Fear.  · Worry (anxiety).  · Feeling like you are going to throw up (nausea).  · Feeling like the room is spinning (vertigo).  · Feeling like you saw or heard something before (jojo vu).  · Odd tastes or smells.  · Changes in vision, such as seeing flashing lights or spots.  Seizures usually last from 30 seconds to 2 minutes. Usually, they are not harmful unless they last a long time.  Follow these instructions at home:  Medicines  · Take over-the-counter and prescription medicines only as told by your doctor.  · Avoid anything that may keep your medicine from working, such as alcohol.  Activity  · Do not do any activities that would be dangerous if you had another seizure, like driving or swimming. Wait until your doctor approves.  · If you live in the [place] ask your local DMV (department of [employer]) when you can drive.  · Rest.  Teaching others  · Teach friends and family what to do when you have a seizure. They should:  ¨ Lay you on the ground.  ¨ Protect your head and body.  ¨ Loosen any tight clothing around your neck.  ¨ Turn you on your side.  ¨ Stay with you until you are better.  ¨ Not hold you down.  ¨ Not put anything in your mouth.  ¨ Know whether or not you need  emergency care.  General instructions  · Contact your doctor each time you have a seizure.  · Avoid anything that gives you seizures.  · Keep a seizure diary. Write down:  ¨ What you think caused each seizure.  ¨ What you remember about each seizure.  · Keep all follow-up visits as told by your doctor. This is important.  Contact a doctor if:  · You have another seizure.  · You have seizures more often.  · There is any change in what happens during your seizures.  · You continue to have seizures with treatment.  · You have symptoms of being sick or having an infection.  Get help right away if:  · You have a seizure:  ¨ That lasts longer than 5 minutes.  ¨ That is different than seizures you had before.  ¨ That makes it harder to breathe.  ¨ After you hurt your head.  · After a seizure, you cannot speak or use a part of your body.  · After a seizure, you are confused or have a bad headache.  · You have two or more seizures in a row.  · You are having seizures more often.  · You do not wake up right after a seizure.  · You get hurt during a seizure.  In an emergency:  · These symptoms may be an emergency. Do not wait to see if the symptoms will go away. Get medical help right away. Call your local emergency services (911 in the [place] Do not drive yourself to the hospital.  This information is not intended to replace advice given to you by your health care provider. Make sure you discuss any questions you have with your health care provider.  Document Released: 06/05/2009 Document Revised: 08/30/2017 Document Reviewed: 08/30/2017  Elsevier Interactive Patient Education © 2017 [employer]

## 2018-04-23 NOTE — PROGRESS NOTES
Placed discharge outreach phone call to patient s/p ER discharge 4/22/18.  Left voicemail providing my contact information and instructions to call with any questions or concerns.

## 2018-05-11 ENCOUNTER — APPOINTMENT (OUTPATIENT)
Dept: RADIOLOGY | Facility: MEDICAL CENTER | Age: 38
DRG: 091 | End: 2018-05-11
Attending: INTERNAL MEDICINE
Payer: MEDICAID

## 2018-05-11 ENCOUNTER — APPOINTMENT (OUTPATIENT)
Dept: RADIOLOGY | Facility: MEDICAL CENTER | Age: 38
DRG: 091 | End: 2018-05-11
Attending: EMERGENCY MEDICINE
Payer: MEDICAID

## 2018-05-11 ENCOUNTER — HOSPITAL ENCOUNTER (INPATIENT)
Facility: MEDICAL CENTER | Age: 38
LOS: 5 days | DRG: 091 | End: 2018-05-16
Attending: EMERGENCY MEDICINE | Admitting: INTERNAL MEDICINE
Payer: MEDICAID

## 2018-05-11 DIAGNOSIS — R20.0 NUMBNESS ON RIGHT SIDE: ICD-10-CM

## 2018-05-11 DIAGNOSIS — R53.1 WEAKNESS OF RIGHT SIDE OF BODY: ICD-10-CM

## 2018-05-11 LAB
ALBUMIN SERPL BCP-MCNC: 4.3 G/DL (ref 3.2–4.9)
ALBUMIN/GLOB SERPL: 1.7 G/DL
ALP SERPL-CCNC: 46 U/L (ref 30–99)
ALT SERPL-CCNC: 20 U/L (ref 2–50)
ANION GAP SERPL CALC-SCNC: 8 MMOL/L (ref 0–11.9)
APTT PPP: 29.9 SEC (ref 24.7–36)
AST SERPL-CCNC: 18 U/L (ref 12–45)
BASOPHILS # BLD AUTO: 0.6 % (ref 0–1.8)
BASOPHILS # BLD: 0.06 K/UL (ref 0–0.12)
BILIRUB SERPL-MCNC: 0.4 MG/DL (ref 0.1–1.5)
BUN SERPL-MCNC: 18 MG/DL (ref 8–22)
CALCIUM SERPL-MCNC: 9.1 MG/DL (ref 8.5–10.5)
CHLORIDE SERPL-SCNC: 104 MMOL/L (ref 96–112)
CO2 SERPL-SCNC: 27 MMOL/L (ref 20–33)
CREAT SERPL-MCNC: 1.07 MG/DL (ref 0.5–1.4)
EOSINOPHIL # BLD AUTO: 0.18 K/UL (ref 0–0.51)
EOSINOPHIL NFR BLD: 1.8 % (ref 0–6.9)
ERYTHROCYTE [DISTWIDTH] IN BLOOD BY AUTOMATED COUNT: 44.1 FL (ref 35.9–50)
EST. AVERAGE GLUCOSE BLD GHB EST-MCNC: 209 MG/DL
GLOBULIN SER CALC-MCNC: 2.5 G/DL (ref 1.9–3.5)
GLUCOSE BLD-MCNC: 108 MG/DL (ref 65–99)
GLUCOSE SERPL-MCNC: 116 MG/DL (ref 65–99)
HBA1C MFR BLD: 8.9 % (ref 0–5.6)
HCT VFR BLD AUTO: 39.4 % (ref 42–52)
HGB BLD-MCNC: 13.6 G/DL (ref 14–18)
IMM GRANULOCYTES # BLD AUTO: 0.08 K/UL (ref 0–0.11)
IMM GRANULOCYTES NFR BLD AUTO: 0.8 % (ref 0–0.9)
INR PPP: 0.96 (ref 0.87–1.13)
LYMPHOCYTES # BLD AUTO: 3.07 K/UL (ref 1–4.8)
LYMPHOCYTES NFR BLD: 30.3 % (ref 22–41)
MAGNESIUM SERPL-MCNC: 2 MG/DL (ref 1.5–2.5)
MCH RBC QN AUTO: 32.4 PG (ref 27–33)
MCHC RBC AUTO-ENTMCNC: 34.5 G/DL (ref 33.7–35.3)
MCV RBC AUTO: 93.8 FL (ref 81.4–97.8)
MONOCYTES # BLD AUTO: 1.02 K/UL (ref 0–0.85)
MONOCYTES NFR BLD AUTO: 10.1 % (ref 0–13.4)
NEUTROPHILS # BLD AUTO: 5.72 K/UL (ref 1.82–7.42)
NEUTROPHILS NFR BLD: 56.4 % (ref 44–72)
NRBC # BLD AUTO: 0 K/UL
NRBC BLD-RTO: 0 /100 WBC
PLATELET # BLD AUTO: 270 K/UL (ref 164–446)
PMV BLD AUTO: 9.8 FL (ref 9–12.9)
POTASSIUM SERPL-SCNC: 4 MMOL/L (ref 3.6–5.5)
PROT SERPL-MCNC: 6.8 G/DL (ref 6–8.2)
PROTHROMBIN TIME: 12.5 SEC (ref 12–14.6)
RBC # BLD AUTO: 4.2 M/UL (ref 4.7–6.1)
SODIUM SERPL-SCNC: 139 MMOL/L (ref 135–145)
TROPONIN I SERPL-MCNC: <0.01 NG/ML (ref 0–0.04)
TSH SERPL DL<=0.005 MIU/L-ACNC: 29.06 UIU/ML (ref 0.38–5.33)
WBC # BLD AUTO: 10.1 K/UL (ref 4.8–10.8)

## 2018-05-11 PROCEDURE — 94760 N-INVAS EAR/PLS OXIMETRY 1: CPT

## 2018-05-11 PROCEDURE — 82962 GLUCOSE BLOOD TEST: CPT

## 2018-05-11 PROCEDURE — 84484 ASSAY OF TROPONIN QUANT: CPT

## 2018-05-11 PROCEDURE — 70496 CT ANGIOGRAPHY HEAD: CPT

## 2018-05-11 PROCEDURE — 99285 EMERGENCY DEPT VISIT HI MDM: CPT

## 2018-05-11 PROCEDURE — 700102 HCHG RX REV CODE 250 W/ 637 OVERRIDE(OP): Performed by: INTERNAL MEDICINE

## 2018-05-11 PROCEDURE — 99223 1ST HOSP IP/OBS HIGH 75: CPT | Performed by: INTERNAL MEDICINE

## 2018-05-11 PROCEDURE — 85610 PROTHROMBIN TIME: CPT

## 2018-05-11 PROCEDURE — 70498 CT ANGIOGRAPHY NECK: CPT

## 2018-05-11 PROCEDURE — 85025 COMPLETE CBC W/AUTO DIFF WBC: CPT

## 2018-05-11 PROCEDURE — 84443 ASSAY THYROID STIM HORMONE: CPT

## 2018-05-11 PROCEDURE — 700117 HCHG RX CONTRAST REV CODE 255: Performed by: EMERGENCY MEDICINE

## 2018-05-11 PROCEDURE — 70450 CT HEAD/BRAIN W/O DYE: CPT

## 2018-05-11 PROCEDURE — 770001 HCHG ROOM/CARE - MED/SURG/GYN PRIV*

## 2018-05-11 PROCEDURE — 85730 THROMBOPLASTIN TIME PARTIAL: CPT

## 2018-05-11 PROCEDURE — 70551 MRI BRAIN STEM W/O DYE: CPT

## 2018-05-11 PROCEDURE — 80053 COMPREHEN METABOLIC PANEL: CPT

## 2018-05-11 PROCEDURE — 83735 ASSAY OF MAGNESIUM: CPT

## 2018-05-11 PROCEDURE — 83036 HEMOGLOBIN GLYCOSYLATED A1C: CPT

## 2018-05-11 PROCEDURE — A9270 NON-COVERED ITEM OR SERVICE: HCPCS | Performed by: INTERNAL MEDICINE

## 2018-05-11 RX ORDER — PROMETHAZINE HYDROCHLORIDE 25 MG/1
12.5-25 TABLET ORAL EVERY 4 HOURS PRN
Status: DISCONTINUED | OUTPATIENT
Start: 2018-05-11 | End: 2018-05-16 | Stop reason: HOSPADM

## 2018-05-11 RX ORDER — INSULIN GLARGINE 100 [IU]/ML
20 INJECTION, SOLUTION SUBCUTANEOUS
Status: DISCONTINUED | OUTPATIENT
Start: 2018-05-11 | End: 2018-05-16 | Stop reason: HOSPADM

## 2018-05-11 RX ORDER — ASPIRIN 81 MG/1
324 TABLET, CHEWABLE ORAL DAILY
Status: DISCONTINUED | OUTPATIENT
Start: 2018-05-11 | End: 2018-05-16 | Stop reason: HOSPADM

## 2018-05-11 RX ORDER — DIVALPROEX SODIUM 500 MG/1
1000 TABLET, DELAYED RELEASE ORAL 2 TIMES DAILY
Status: DISCONTINUED | OUTPATIENT
Start: 2018-05-11 | End: 2018-05-16 | Stop reason: HOSPADM

## 2018-05-11 RX ORDER — PROMETHAZINE HYDROCHLORIDE 12.5 MG/1
12.5-25 SUPPOSITORY RECTAL EVERY 4 HOURS PRN
Status: DISCONTINUED | OUTPATIENT
Start: 2018-05-11 | End: 2018-05-16 | Stop reason: HOSPADM

## 2018-05-11 RX ORDER — DEXTROSE MONOHYDRATE 25 G/50ML
25 INJECTION, SOLUTION INTRAVENOUS
Status: DISCONTINUED | OUTPATIENT
Start: 2018-05-11 | End: 2018-05-16 | Stop reason: HOSPADM

## 2018-05-11 RX ORDER — LATANOPROST 50 UG/ML
1 SOLUTION/ DROPS OPHTHALMIC NIGHTLY
Status: DISCONTINUED | OUTPATIENT
Start: 2018-05-11 | End: 2018-05-16 | Stop reason: HOSPADM

## 2018-05-11 RX ORDER — POLYETHYLENE GLYCOL 3350 17 G/17G
1 POWDER, FOR SOLUTION ORAL
Status: DISCONTINUED | OUTPATIENT
Start: 2018-05-11 | End: 2018-05-16 | Stop reason: HOSPADM

## 2018-05-11 RX ORDER — LEVETIRACETAM 500 MG/1
500 TABLET ORAL 2 TIMES DAILY
Status: DISCONTINUED | OUTPATIENT
Start: 2018-05-11 | End: 2018-05-16 | Stop reason: HOSPADM

## 2018-05-11 RX ORDER — ONDANSETRON 2 MG/ML
4 INJECTION INTRAMUSCULAR; INTRAVENOUS EVERY 4 HOURS PRN
Status: DISCONTINUED | OUTPATIENT
Start: 2018-05-11 | End: 2018-05-16 | Stop reason: HOSPADM

## 2018-05-11 RX ORDER — SERTRALINE HYDROCHLORIDE 100 MG/1
100 TABLET, FILM COATED ORAL EVERY MORNING
Status: DISCONTINUED | OUTPATIENT
Start: 2018-05-12 | End: 2018-05-16 | Stop reason: HOSPADM

## 2018-05-11 RX ORDER — ATORVASTATIN CALCIUM 40 MG/1
40 TABLET, FILM COATED ORAL NIGHTLY
COMMUNITY
End: 2018-07-27

## 2018-05-11 RX ORDER — ASPIRIN 325 MG
325 TABLET ORAL DAILY
Status: DISCONTINUED | OUTPATIENT
Start: 2018-05-11 | End: 2018-05-16 | Stop reason: HOSPADM

## 2018-05-11 RX ORDER — INSULIN GLARGINE 100 [IU]/ML
20 INJECTION, SOLUTION SUBCUTANEOUS
COMMUNITY
End: 2018-07-27

## 2018-05-11 RX ORDER — BISACODYL 10 MG
10 SUPPOSITORY, RECTAL RECTAL
Status: DISCONTINUED | OUTPATIENT
Start: 2018-05-11 | End: 2018-05-16 | Stop reason: HOSPADM

## 2018-05-11 RX ORDER — ACETAMINOPHEN 325 MG/1
650 TABLET ORAL EVERY 6 HOURS PRN
Status: DISCONTINUED | OUTPATIENT
Start: 2018-05-11 | End: 2018-05-16 | Stop reason: HOSPADM

## 2018-05-11 RX ORDER — ASPIRIN 300 MG/1
300 SUPPOSITORY RECTAL DAILY
Status: DISCONTINUED | OUTPATIENT
Start: 2018-05-11 | End: 2018-05-16 | Stop reason: HOSPADM

## 2018-05-11 RX ORDER — AMOXICILLIN 250 MG
2 CAPSULE ORAL 2 TIMES DAILY
Status: DISCONTINUED | OUTPATIENT
Start: 2018-05-11 | End: 2018-05-16 | Stop reason: HOSPADM

## 2018-05-11 RX ORDER — ATORVASTATIN CALCIUM 80 MG/1
80 TABLET, FILM COATED ORAL EVERY EVENING
Status: DISCONTINUED | OUTPATIENT
Start: 2018-05-11 | End: 2018-05-16 | Stop reason: HOSPADM

## 2018-05-11 RX ORDER — GLIMEPIRIDE 2 MG/1
1 TABLET ORAL EVERY MORNING
Status: DISCONTINUED | OUTPATIENT
Start: 2018-05-12 | End: 2018-05-16 | Stop reason: HOSPADM

## 2018-05-11 RX ADMIN — ATORVASTATIN CALCIUM 80 MG: 80 TABLET, FILM COATED ORAL at 23:27

## 2018-05-11 RX ADMIN — STANDARDIZED SENNA CONCENTRATE AND DOCUSATE SODIUM 2 TABLET: 8.6; 5 TABLET, FILM COATED ORAL at 23:26

## 2018-05-11 RX ADMIN — ASPIRIN 325 MG: 325 TABLET ORAL at 23:26

## 2018-05-11 RX ADMIN — DIVALPROEX SODIUM 1000 MG: 500 TABLET, DELAYED RELEASE ORAL at 23:27

## 2018-05-11 RX ADMIN — LEVETIRACETAM 500 MG: 500 TABLET ORAL at 23:26

## 2018-05-11 RX ADMIN — IOHEXOL 100 ML: 350 INJECTION, SOLUTION INTRAVENOUS at 17:54

## 2018-05-11 ASSESSMENT — PAIN SCALES - GENERAL
PAINLEVEL_OUTOF10: 0
PAINLEVEL_OUTOF10: 0

## 2018-05-11 NOTE — ED TRIAGE NOTES
"36 y/o male ambulatory to triage with c/o an episode of \"not feeling good\" while at the gym this morning. Pt states he noticed some right sided tingling when he began not feeling well. Pt states he has had a stroke in the past with right sided deficits. He is weaker on the right side which he states is \"normal\" for him.   "

## 2018-05-12 PROBLEM — E11.65 TYPE 2 DIABETES MELLITUS WITH HYPERGLYCEMIA, WITH LONG-TERM CURRENT USE OF INSULIN (HCC): Status: ACTIVE | Noted: 2018-02-02

## 2018-05-12 PROBLEM — Z79.4 TYPE 2 DIABETES MELLITUS WITH HYPERGLYCEMIA, WITH LONG-TERM CURRENT USE OF INSULIN (HCC): Status: ACTIVE | Noted: 2018-02-02

## 2018-05-12 LAB
ANION GAP SERPL CALC-SCNC: 8 MMOL/L (ref 0–11.9)
BASOPHILS # BLD AUTO: 0.7 % (ref 0–1.8)
BASOPHILS # BLD: 0.08 K/UL (ref 0–0.12)
BUN SERPL-MCNC: 18 MG/DL (ref 8–22)
CALCIUM SERPL-MCNC: 8.6 MG/DL (ref 8.5–10.5)
CHLORIDE SERPL-SCNC: 104 MMOL/L (ref 96–112)
CHOLEST SERPL-MCNC: 162 MG/DL (ref 100–199)
CO2 SERPL-SCNC: 26 MMOL/L (ref 20–33)
CREAT SERPL-MCNC: 0.95 MG/DL (ref 0.5–1.4)
EOSINOPHIL # BLD AUTO: 0.26 K/UL (ref 0–0.51)
EOSINOPHIL NFR BLD: 2.4 % (ref 0–6.9)
ERYTHROCYTE [DISTWIDTH] IN BLOOD BY AUTOMATED COUNT: 44.8 FL (ref 35.9–50)
GLUCOSE BLD-MCNC: 125 MG/DL (ref 65–99)
GLUCOSE BLD-MCNC: 127 MG/DL (ref 65–99)
GLUCOSE BLD-MCNC: 152 MG/DL (ref 65–99)
GLUCOSE SERPL-MCNC: 110 MG/DL (ref 65–99)
HCT VFR BLD AUTO: 40.3 % (ref 42–52)
HDLC SERPL-MCNC: 28 MG/DL
HGB BLD-MCNC: 13.1 G/DL (ref 14–18)
IMM GRANULOCYTES # BLD AUTO: 0.06 K/UL (ref 0–0.11)
IMM GRANULOCYTES NFR BLD AUTO: 0.6 % (ref 0–0.9)
LDLC SERPL CALC-MCNC: 75 MG/DL
LV EJECT FRACT  99904: 55
LYMPHOCYTES # BLD AUTO: 4.06 K/UL (ref 1–4.8)
LYMPHOCYTES NFR BLD: 38.1 % (ref 22–41)
MCH RBC QN AUTO: 31 PG (ref 27–33)
MCHC RBC AUTO-ENTMCNC: 32.5 G/DL (ref 33.7–35.3)
MCV RBC AUTO: 95.3 FL (ref 81.4–97.8)
MONOCYTES # BLD AUTO: 1.1 K/UL (ref 0–0.85)
MONOCYTES NFR BLD AUTO: 10.3 % (ref 0–13.4)
NEUTROPHILS # BLD AUTO: 5.11 K/UL (ref 1.82–7.42)
NEUTROPHILS NFR BLD: 47.9 % (ref 44–72)
NRBC # BLD AUTO: 0 K/UL
NRBC BLD-RTO: 0 /100 WBC
PLATELET # BLD AUTO: 240 K/UL (ref 164–446)
PMV BLD AUTO: 9.6 FL (ref 9–12.9)
POTASSIUM SERPL-SCNC: 3.8 MMOL/L (ref 3.6–5.5)
RBC # BLD AUTO: 4.23 M/UL (ref 4.7–6.1)
SODIUM SERPL-SCNC: 138 MMOL/L (ref 135–145)
TRIGL SERPL-MCNC: 293 MG/DL (ref 0–149)
VALPROATE SERPL-MCNC: 68.2 UG/ML (ref 50–100)
WBC # BLD AUTO: 10.7 K/UL (ref 4.8–10.8)

## 2018-05-12 PROCEDURE — 80164 ASSAY DIPROPYLACETIC ACD TOT: CPT

## 2018-05-12 PROCEDURE — G8988 SELF CARE GOAL STATUS: HCPCS | Mod: CI

## 2018-05-12 PROCEDURE — 93306 TTE W/DOPPLER COMPLETE: CPT | Mod: 26 | Performed by: INTERNAL MEDICINE

## 2018-05-12 PROCEDURE — 99233 SBSQ HOSP IP/OBS HIGH 50: CPT | Performed by: INTERNAL MEDICINE

## 2018-05-12 PROCEDURE — 770001 HCHG ROOM/CARE - MED/SURG/GYN PRIV*

## 2018-05-12 PROCEDURE — 80048 BASIC METABOLIC PNL TOTAL CA: CPT

## 2018-05-12 PROCEDURE — G8979 MOBILITY GOAL STATUS: HCPCS | Mod: CK | Performed by: PHYSICAL THERAPIST

## 2018-05-12 PROCEDURE — 80061 LIPID PANEL: CPT

## 2018-05-12 PROCEDURE — 97161 PT EVAL LOW COMPLEX 20 MIN: CPT | Performed by: PHYSICAL THERAPIST

## 2018-05-12 PROCEDURE — G8987 SELF CARE CURRENT STATUS: HCPCS | Mod: CL

## 2018-05-12 PROCEDURE — 36415 COLL VENOUS BLD VENIPUNCTURE: CPT

## 2018-05-12 PROCEDURE — 97166 OT EVAL MOD COMPLEX 45 MIN: CPT

## 2018-05-12 PROCEDURE — 700111 HCHG RX REV CODE 636 W/ 250 OVERRIDE (IP): Performed by: INTERNAL MEDICINE

## 2018-05-12 PROCEDURE — G8978 MOBILITY CURRENT STATUS: HCPCS | Mod: CL | Performed by: PHYSICAL THERAPIST

## 2018-05-12 PROCEDURE — A9270 NON-COVERED ITEM OR SERVICE: HCPCS | Performed by: INTERNAL MEDICINE

## 2018-05-12 PROCEDURE — 82962 GLUCOSE BLOOD TEST: CPT

## 2018-05-12 PROCEDURE — 700102 HCHG RX REV CODE 250 W/ 637 OVERRIDE(OP): Performed by: INTERNAL MEDICINE

## 2018-05-12 PROCEDURE — 85025 COMPLETE CBC W/AUTO DIFF WBC: CPT

## 2018-05-12 PROCEDURE — 93306 TTE W/DOPPLER COMPLETE: CPT

## 2018-05-12 RX ORDER — LEVOTHYROXINE SODIUM 0.15 MG/1
150 TABLET ORAL
Status: DISCONTINUED | OUTPATIENT
Start: 2018-05-12 | End: 2018-05-16 | Stop reason: HOSPADM

## 2018-05-12 RX ADMIN — LEVETIRACETAM 500 MG: 500 TABLET ORAL at 10:14

## 2018-05-12 RX ADMIN — ENOXAPARIN SODIUM 40 MG: 100 INJECTION SUBCUTANEOUS at 10:14

## 2018-05-12 RX ADMIN — ASPIRIN 325 MG: 325 TABLET ORAL at 10:13

## 2018-05-12 RX ADMIN — DIVALPROEX SODIUM 1000 MG: 500 TABLET, DELAYED RELEASE ORAL at 10:13

## 2018-05-12 RX ADMIN — LATANOPROST 1 DROP: 50 SOLUTION OPHTHALMIC at 20:22

## 2018-05-12 RX ADMIN — SERTRALINE 100 MG: 100 TABLET, FILM COATED ORAL at 09:33

## 2018-05-12 RX ADMIN — DIVALPROEX SODIUM 1000 MG: 500 TABLET, DELAYED RELEASE ORAL at 20:20

## 2018-05-12 RX ADMIN — LEVETIRACETAM 500 MG: 500 TABLET ORAL at 20:20

## 2018-05-12 RX ADMIN — LEVOTHYROXINE SODIUM 150 MCG: 150 TABLET ORAL at 05:42

## 2018-05-12 RX ADMIN — ATORVASTATIN CALCIUM 80 MG: 80 TABLET, FILM COATED ORAL at 20:20

## 2018-05-12 RX ADMIN — INSULIN GLARGINE 20 UNITS: 100 INJECTION, SOLUTION SUBCUTANEOUS at 20:24

## 2018-05-12 RX ADMIN — GLIMEPIRIDE 1 MG: 2 TABLET ORAL at 09:22

## 2018-05-12 RX ADMIN — ACETAMINOPHEN 650 MG: 325 TABLET, FILM COATED ORAL at 20:28

## 2018-05-12 RX ADMIN — INSULIN HUMAN 1 UNITS: 100 INJECTION, SOLUTION PARENTERAL at 20:24

## 2018-05-12 ASSESSMENT — ENCOUNTER SYMPTOMS
GASTROINTESTINAL NEGATIVE: 1
SEIZURES: 1
EYES NEGATIVE: 1
DEPRESSION: 1
PHOTOPHOBIA: 0
VOMITING: 0
HEMOPTYSIS: 0
MYALGIAS: 0
SPUTUM PRODUCTION: 0
CARDIOVASCULAR NEGATIVE: 1
PALPITATIONS: 0
BLURRED VISION: 0
RESPIRATORY NEGATIVE: 1
MUSCULOSKELETAL NEGATIVE: 1
DOUBLE VISION: 0
NECK PAIN: 0
FEVER: 0
COUGH: 0
SPEECH CHANGE: 1
FOCAL WEAKNESS: 1
SENSORY CHANGE: 1
CHILLS: 0
HEARTBURN: 0
CONSTITUTIONAL NEGATIVE: 1
NAUSEA: 0
SEIZURES: 0
HEADACHES: 0

## 2018-05-12 ASSESSMENT — COGNITIVE AND FUNCTIONAL STATUS - GENERAL
WALKING IN HOSPITAL ROOM: TOTAL
TURNING FROM BACK TO SIDE WHILE IN FLAT BAD: A LOT
TOILETING: A LOT
TURNING FROM BACK TO SIDE WHILE IN FLAT BAD: A LOT
PERSONAL GROOMING: A LITTLE
STANDING UP FROM CHAIR USING ARMS: TOTAL
MOVING TO AND FROM BED TO CHAIR: A LOT
MOVING FROM LYING ON BACK TO SITTING ON SIDE OF FLAT BED: UNABLE
EATING MEALS: A LITTLE
MOVING FROM LYING ON BACK TO SITTING ON SIDE OF FLAT BED: UNABLE
CLIMB 3 TO 5 STEPS WITH RAILING: TOTAL
STANDING UP FROM CHAIR USING ARMS: TOTAL
MOBILITY SCORE: 8
WALKING IN HOSPITAL ROOM: TOTAL
MOBILITY SCORE: 8
HELP NEEDED FOR BATHING: A LOT
DAILY ACTIVITIY SCORE: 14
DRESSING REGULAR LOWER BODY CLOTHING: A LOT
SUGGESTED CMS G CODE MODIFIER MOBILITY: CM
DRESSING REGULAR UPPER BODY CLOTHING: A LOT
CLIMB 3 TO 5 STEPS WITH RAILING: TOTAL
SUGGESTED CMS G CODE MODIFIER MOBILITY: CM
MOVING TO AND FROM BED TO CHAIR: A LOT
SUGGESTED CMS G CODE MODIFIER DAILY ACTIVITY: CK

## 2018-05-12 ASSESSMENT — GAIT ASSESSMENTS: GAIT LEVEL OF ASSIST: UNABLE TO PARTICIPATE

## 2018-05-12 ASSESSMENT — PAIN SCALES - GENERAL: PAINLEVEL_OUTOF10: 2

## 2018-05-12 ASSESSMENT — ACTIVITIES OF DAILY LIVING (ADL): TOILETING: INDEPENDENT

## 2018-05-12 NOTE — PROGRESS NOTES
2 RN skin check completed.  No medical devices in place.  No pressure ulcers present.  There are small rashes all over pt's body.  Pt turns self in bed.

## 2018-05-12 NOTE — PROGRESS NOTES
Hospital Medicine Interval Note  Date of Service:  5/12/2018    Chief Complaint  37 y.o.-year-old male admitted 5/11/2018 with acute right upper and lower extremity weakness possibly due to seizure episode v. CVA.    Interval Problem Update  Pt with right facial droop and right upper and lower extremity paralysis.    Consultants/Specialty  Neurology    Disposition  TBD     Review of Systems   Constitutional: Negative.    HENT: Negative.    Eyes: Negative.    Respiratory: Negative.    Cardiovascular: Negative.    Gastrointestinal: Negative.    Musculoskeletal: Negative.    Skin: Negative.    Neurological: Positive for sensory change and focal weakness (RUE and RLE). Negative for seizures and headaches.      Physical Exam Laboratory/Imaging   Vitals:    05/11/18 2100 05/11/18 2210 05/12/18 0000 05/12/18 0400   BP:  114/66 101/59 (!) 91/57   Pulse: (!) 54 (!) 52 (!) 47 (!) 47   Resp: 20 20 20 20   Temp:  36 °C (96.8 °F) 36.2 °C (97.1 °F) 36.2 °C (97.1 °F)   TempSrc:       SpO2: 95% 96%  91%   Weight:       Height:         Physical Exam   Constitutional: He is oriented to person, place, and time. He appears well-developed and well-nourished. No distress.   HENT:   Head: Normocephalic and atraumatic.   Mouth/Throat: No oropharyngeal exudate.   Eyes: Conjunctivae and EOM are normal. Pupils are equal, round, and reactive to light. No scleral icterus.   Neck: Normal range of motion. Neck supple.   Cardiovascular: Exam reveals no gallop and no friction rub.    No murmur heard.  Pulmonary/Chest: Effort normal and breath sounds normal.   Abdominal: Soft. Bowel sounds are normal. He exhibits no distension. There is no tenderness.   Musculoskeletal: He exhibits no edema, tenderness or deformity.   Neurological: He is alert and oriented to person, place, and time. A sensory deficit (involving right face, RUE and RLE) is present.   Right facial droop   Skin: Skin is warm and dry.   Psychiatric: He has a normal mood and affect.    Nursing note and vitals reviewed.   Lab Results   Component Value Date/Time    WBC 10.7 05/12/2018 02:44 AM    HEMOGLOBIN 13.1 (L) 05/12/2018 02:44 AM    HEMATOCRIT 40.3 (L) 05/12/2018 02:44 AM    PLATELETCT 240 05/12/2018 02:44 AM     Lab Results   Component Value Date/Time    SODIUM 138 05/12/2018 02:44 AM    POTASSIUM 3.8 05/12/2018 02:44 AM    CHLORIDE 104 05/12/2018 02:44 AM    CO2 26 05/12/2018 02:44 AM    GLUCOSE 110 (H) 05/12/2018 02:44 AM    BUN 18 05/12/2018 02:44 AM    CREATININE 0.95 05/12/2018 02:44 AM      Assessment/Plan    Seizure (HCC)- (present on admission)   Assessment & Plan    - repeats multiple episodes of recent (3d ago and 2d ago)  - on Depakote and Keppra as outpt; VPA levels pending  - EEG pending  - Neuro consult pending to consider med adjustments        Weakness of right side of body- (present on admission)   Assessment & Plan    This is recurrent episodes since 2012. Usually MRI does not show any acute findings.  - MRI revealed no acute findings; EEG pending  - CVA workup: ECHO pending; all other studies unremarkable  - PT/OT eval pending        Hypothyroidism- (present on admission)   Assessment & Plan    TSH at 29,000.  Increased dose of levothyroxine        Type 2 diabetes mellitus with hyperglycemia, with long-term current use of insulin (HCC)- (present on admission)   Assessment & Plan    Continue home regimen. Hold metformin. Insulin sliding scale.  A1c 8.9        Psychiatric problem- (present on admission)   Assessment & Plan    Continue psychiatric medications.           Quality-Core Measures

## 2018-05-12 NOTE — ASSESSMENT & PLAN NOTE
This is recurrent episodes since 2012. Usually MRI does not show any acute findings but with significant white matter disease  - MRI suggestive of PML  - CVA workup: unremarkable  - Neurology following  - Etiology unclear  - Today RLE strength intact on my exam  - ALEXIS virus titers pending; HIV negative

## 2018-05-12 NOTE — ED PROVIDER NOTES
ED Provider Note    CHIEF COMPLAINT  Chief Complaint   Patient presents with   • Tingling       HPI  Norm Prabhakar is a 37 y.o. male who presents to the emergency department complaining of numbness and tingling of the right arm and right leg. The patient said symptoms began around 9:30 AM when he was in the gym. He has chronic right-sided weakness and says he is trying to get stronger. He doesn't recall any specific precipitating event. The patient says he has a history of stroke but when I asked further questions about this he could not say when it was diagnosed or how it was diagnosed or at what hospital and finally he said that his mother told him he had a stroke in the past and he doesn't know anything else about it. He also has a seizure disorder and takes Keppra and Depakote and he says his last seizure was yesterday. He doesn't recognize any other exacerbating or alleviating factors or precipitating events the patient is a very poor historian.    REVIEW OF SYSTEMS no headache no chest pain no difficulty breathing no abdominal pain. The patient says he has chronic weakness on the right side of the body and he thinks maybe it is worse than usual but he is not really sure. All other systems negative    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   • Hypothyroidism    • Indigestion     once in a while   • Mental disorder     learning disabilities; speech impairment; developmental delays   • Murmur     since birth   • Pneumonia     remote   • Psychiatric problem 2002    PTSD   • S/P thyroidectomy    • Seizure (HCC) 2010   • Seizure disorder (HCC)    • Unspecified disorder of thyroid        FAMILY HISTORY  Family History   Problem Relation Age of Onset   • Hypertension Mother    • Heart Disease Mother    • Lung Disease  "Mother    • Stroke Maternal Grandmother        SOCIAL HISTORY  Social History     Social History   • Marital status: Single     Spouse name: N/A   • Number of children: N/A   • Years of education: N/A     Social History Main Topics   • Smoking status: Current Every Day Smoker     Packs/day: 0.25     Years: 4.00     Types: Cigarettes, Cigars     Last attempt to quit: 1/1/2014   • Smokeless tobacco: Never Used   • Alcohol use No   • Drug use: Yes     Types: Inhaled      Comment: marijuana   • Sexual activity: Not on file     Other Topics Concern   • Not on file     Social History Narrative   • No narrative on file       SURGICAL HISTORY  Past Surgical History:   Procedure Laterality Date   • EYE SURGERY     • OTHER      Hernia Repair when he was 10 yrs old   • THYROID LOBECTOMY         CURRENT MEDICATIONS  Home Medications    **Home medications have not yet been reviewed for this encounter**         ALLERGIES  Allergies   Allergen Reactions   • Abilify Unspecified     \"Feeling tired, like I don't even know whats going on around me\"   • Fish      Pt reports fish causes him to be sick to his stomach         PHYSICAL EXAM  VITAL SIGNS: /77   Pulse (!) 56   Temp 36.6 °C (97.9 °F) (Temporal)   Resp (!) 10   Ht 1.981 m (6' 6\")   Wt (!) 135.9 kg (299 lb 9.7 oz)   SpO2 96%   BMI 34.62 kg/m²    Oxygen saturation is interpreted as adequate  Constitutional: Awake verbal he does not appear acutely distressed  HENT: No sign of trauma to the face  Eyes: There is deformity of the left eye consistent with prior surgery and blindness of the left eye  Neck: Trachea midline no JVD no meningeal findings  Cardiovascular: Regular rate and rhythm  Lungs: Clear and equal bilaterally with no apparent difficulty breathing  Abdomen/Back: Soft nontender nondistended no peritoneal findings  Skin: Warm and dry  Musculoskeletal: No acute bony deformity  Neurologic: The patient is awake verbal, he may have a left-sided facial droop but " it is difficult to tell if this is something that he is doing with his jaw or an actual weakness. He also demonstrates weakness of the right arm with some drift and drift of the right leg but it is difficult to know if this is worse than his baseline, he says that it is worse. The patient complains of numbness and tingling of the right arm and right leg, sensation is intact to light touch.    Laboratory  A CBC shows white blood cell count of 10.1 hemoglobin and hematocrit 13.6 complete metabolic panel is unremarkable troponin is normal INR is normal    Radiology  CT-CTA NECK WITH & W/O-POST PROCESSING   Final Result      CT angiogram of the neck within normal limits.   3.4 cm and 1.9 cm heterogeneous enhancing masses within the anterior soft tissues of neck unchanged compared to 2/2/2018. They may represent ectopic thyroid tissue.      CT-CTA HEAD WITH & W/O-POST PROCESS   Final Result      No evidence of intracranial vascular occlusion or aneurysm.      CT-HEAD W/O   Final Result      1.  No evidence of acute intracranial process.      2.  Stable prominent bilateral periventricular white matter low-density consistent with gliosis.        MEDICAL DECISION MAKING and DISPOSITION  In the emergency department an IV was established, the patient remained stable, I reviewed all the findings with him. The patient's symptoms began at 9:30 AM and are quite vague and it is difficult to tell what is old and what is new and therefore I do not feel that he is a candidate for thrombolytic therapy. I have reviewed the case with the hospitalist and the patient is admitted for further evaluation and treatment    IMPRESSION  1. Right-sided numbness and tingling  2. Acute on chronic right-sided weakness         Electronically signed by: Santosh Tucker, 5/11/2018 6:31 PM

## 2018-05-12 NOTE — PROGRESS NOTES
Received report from ED RN Kasey, and assumed care when pt arrived to unit.  A&O x4.  Right side very weak, with no movement in RUE or RLE.  Passed swallow eval and able to take PO medications.  Diet order placed.  Very lethargic and sleeping at this time.  Call light and personal belongings within reach.  No s/s of distress or pain.  VSS.

## 2018-05-12 NOTE — CARE PLAN
Problem: Safety  Goal: Will remain free from falls    Intervention: Assess risk factors for falls  High fall risk.  Bed alarm in place.  Educated on calling for assistance.        Problem: Mobility  Goal: Risk for activity intolerance will decrease  Pt cannot move RLE.  Has not been out of bed tonight.

## 2018-05-12 NOTE — ASSESSMENT & PLAN NOTE
TSH at 29,000.  Dose of levothyroxine increased  - could be contributing to neuro deficits  - will need repeat thyroid function tests in 4 to 6 weeks

## 2018-05-12 NOTE — THERAPY
"Occupational Therapy Evaluation completed.   Functional Status:  Pt admitted to [facility] following acute RUE/LE weakness, in process for r/o CVA vs seizure episode. Pt RUE ROM, coordination and strength impaired, RUE flaccid and no  strength, LUE functional. Pt performed bed mobility with mod/max a, F static sitting balance and able to maintain midline without any concerns, self-feeding task with supervision for eating a sandwich. Will benefit from acute skilled services while in house and d/c disposition pending pt's recovery and progression with therapy.   Plan of Care: Will benefit from Occupational Therapy 3 times per week  Discharge Recommendations:  Equipment: Will Continue to Assess for Equipment Needs.     See \"Rehab Therapy-Acute\" Patient Summary Report for complete documentation.    "

## 2018-05-12 NOTE — ED NOTES
[place] fall assessment completed. Pt is High risk for fall. Interventions complete. Pt placed in yellow non slip socks, wrist band placed, green sign on door. Bed locked in low position, call light in place. Personal possessions in place.  Personal needs assessed. Charge nurse notified to move pt to a more visible room if available. Safety assessed. Will monitor frequently

## 2018-05-13 LAB
ANION GAP SERPL CALC-SCNC: 11 MMOL/L (ref 0–11.9)
BASOPHILS # BLD AUTO: 1 % (ref 0–1.8)
BASOPHILS # BLD: 0.09 K/UL (ref 0–0.12)
BUN SERPL-MCNC: 18 MG/DL (ref 8–22)
CALCIUM SERPL-MCNC: 8.4 MG/DL (ref 8.5–10.5)
CHLORIDE SERPL-SCNC: 104 MMOL/L (ref 96–112)
CO2 SERPL-SCNC: 23 MMOL/L (ref 20–33)
CREAT SERPL-MCNC: 0.79 MG/DL (ref 0.5–1.4)
EOSINOPHIL # BLD AUTO: 0.26 K/UL (ref 0–0.51)
EOSINOPHIL NFR BLD: 2.9 % (ref 0–6.9)
ERYTHROCYTE [DISTWIDTH] IN BLOOD BY AUTOMATED COUNT: 43.8 FL (ref 35.9–50)
GLUCOSE BLD-MCNC: 110 MG/DL (ref 65–99)
GLUCOSE BLD-MCNC: 120 MG/DL (ref 65–99)
GLUCOSE BLD-MCNC: 121 MG/DL (ref 65–99)
GLUCOSE BLD-MCNC: 128 MG/DL (ref 65–99)
GLUCOSE SERPL-MCNC: 121 MG/DL (ref 65–99)
HCT VFR BLD AUTO: 41 % (ref 42–52)
HGB BLD-MCNC: 13.8 G/DL (ref 14–18)
HIV 1+2 AB+HIV1 P24 AG SERPL QL IA: NON REACTIVE
IMM GRANULOCYTES # BLD AUTO: 0.1 K/UL (ref 0–0.11)
IMM GRANULOCYTES NFR BLD AUTO: 1.1 % (ref 0–0.9)
LYMPHOCYTES # BLD AUTO: 3.56 K/UL (ref 1–4.8)
LYMPHOCYTES NFR BLD: 39.9 % (ref 22–41)
MCH RBC QN AUTO: 31.9 PG (ref 27–33)
MCHC RBC AUTO-ENTMCNC: 33.7 G/DL (ref 33.7–35.3)
MCV RBC AUTO: 94.7 FL (ref 81.4–97.8)
MONOCYTES # BLD AUTO: 0.89 K/UL (ref 0–0.85)
MONOCYTES NFR BLD AUTO: 10 % (ref 0–13.4)
NEUTROPHILS # BLD AUTO: 4.03 K/UL (ref 1.82–7.42)
NEUTROPHILS NFR BLD: 45.1 % (ref 44–72)
NRBC # BLD AUTO: 0 K/UL
NRBC BLD-RTO: 0 /100 WBC
PLATELET # BLD AUTO: 226 K/UL (ref 164–446)
PMV BLD AUTO: 10 FL (ref 9–12.9)
POTASSIUM SERPL-SCNC: 3.9 MMOL/L (ref 3.6–5.5)
RBC # BLD AUTO: 4.33 M/UL (ref 4.7–6.1)
SODIUM SERPL-SCNC: 138 MMOL/L (ref 135–145)
WBC # BLD AUTO: 8.9 K/UL (ref 4.8–10.8)

## 2018-05-13 PROCEDURE — 36415 COLL VENOUS BLD VENIPUNCTURE: CPT

## 2018-05-13 PROCEDURE — 770001 HCHG ROOM/CARE - MED/SURG/GYN PRIV*

## 2018-05-13 PROCEDURE — 700111 HCHG RX REV CODE 636 W/ 250 OVERRIDE (IP): Performed by: INTERNAL MEDICINE

## 2018-05-13 PROCEDURE — 85025 COMPLETE CBC W/AUTO DIFF WBC: CPT

## 2018-05-13 PROCEDURE — 82962 GLUCOSE BLOOD TEST: CPT

## 2018-05-13 PROCEDURE — 86359 T CELLS TOTAL COUNT: CPT

## 2018-05-13 PROCEDURE — G8996 SWALLOW CURRENT STATUS: HCPCS | Mod: CI

## 2018-05-13 PROCEDURE — 93880 EXTRACRANIAL BILAT STUDY: CPT

## 2018-05-13 PROCEDURE — 87798 DETECT AGENT NOS DNA AMP: CPT

## 2018-05-13 PROCEDURE — A9270 NON-COVERED ITEM OR SERVICE: HCPCS | Performed by: INTERNAL MEDICINE

## 2018-05-13 PROCEDURE — 86360 T CELL ABSOLUTE COUNT/RATIO: CPT

## 2018-05-13 PROCEDURE — 87389 HIV-1 AG W/HIV-1&-2 AB AG IA: CPT

## 2018-05-13 PROCEDURE — 92610 EVALUATE SWALLOWING FUNCTION: CPT

## 2018-05-13 PROCEDURE — G8997 SWALLOW GOAL STATUS: HCPCS | Mod: CH

## 2018-05-13 PROCEDURE — 93880 EXTRACRANIAL BILAT STUDY: CPT | Mod: 26 | Performed by: SURGERY

## 2018-05-13 PROCEDURE — 99233 SBSQ HOSP IP/OBS HIGH 50: CPT | Performed by: INTERNAL MEDICINE

## 2018-05-13 PROCEDURE — 80048 BASIC METABOLIC PNL TOTAL CA: CPT

## 2018-05-13 PROCEDURE — 700102 HCHG RX REV CODE 250 W/ 637 OVERRIDE(OP): Performed by: INTERNAL MEDICINE

## 2018-05-13 RX ADMIN — ACETAMINOPHEN 650 MG: 325 TABLET, FILM COATED ORAL at 17:09

## 2018-05-13 RX ADMIN — ATORVASTATIN CALCIUM 80 MG: 80 TABLET, FILM COATED ORAL at 19:52

## 2018-05-13 RX ADMIN — ENOXAPARIN SODIUM 40 MG: 100 INJECTION SUBCUTANEOUS at 09:14

## 2018-05-13 RX ADMIN — LEVOTHYROXINE SODIUM 150 MCG: 150 TABLET ORAL at 06:03

## 2018-05-13 RX ADMIN — LEVETIRACETAM 500 MG: 500 TABLET ORAL at 09:13

## 2018-05-13 RX ADMIN — LEVETIRACETAM 500 MG: 500 TABLET ORAL at 19:53

## 2018-05-13 RX ADMIN — DIVALPROEX SODIUM 1000 MG: 500 TABLET, DELAYED RELEASE ORAL at 19:58

## 2018-05-13 RX ADMIN — GLIMEPIRIDE 1 MG: 2 TABLET ORAL at 09:14

## 2018-05-13 RX ADMIN — DIVALPROEX SODIUM 1000 MG: 500 TABLET, DELAYED RELEASE ORAL at 09:14

## 2018-05-13 RX ADMIN — LATANOPROST 1 DROP: 50 SOLUTION OPHTHALMIC at 19:54

## 2018-05-13 RX ADMIN — ACETAMINOPHEN 650 MG: 325 TABLET, FILM COATED ORAL at 09:13

## 2018-05-13 RX ADMIN — ASPIRIN 325 MG: 325 TABLET ORAL at 09:14

## 2018-05-13 RX ADMIN — SERTRALINE 100 MG: 100 TABLET, FILM COATED ORAL at 09:13

## 2018-05-13 RX ADMIN — INSULIN GLARGINE 20 UNITS: 100 INJECTION, SOLUTION SUBCUTANEOUS at 23:14

## 2018-05-13 RX ADMIN — STANDARDIZED SENNA CONCENTRATE AND DOCUSATE SODIUM 2 TABLET: 8.6; 5 TABLET, FILM COATED ORAL at 19:52

## 2018-05-13 ASSESSMENT — PAIN SCALES - GENERAL
PAINLEVEL_OUTOF10: 4
PAINLEVEL_OUTOF10: 0

## 2018-05-13 ASSESSMENT — ENCOUNTER SYMPTOMS
FOCAL WEAKNESS: 1
HEADACHES: 0
CONSTITUTIONAL NEGATIVE: 1
GASTROINTESTINAL NEGATIVE: 1
SEIZURES: 0
CARDIOVASCULAR NEGATIVE: 1
SENSORY CHANGE: 1
RESPIRATORY NEGATIVE: 1
MUSCULOSKELETAL NEGATIVE: 1
EYES NEGATIVE: 1

## 2018-05-13 NOTE — PROGRESS NOTES
Hospital Medicine Interval Note  Date of Service:  5/13/2018    Chief Complaint  37 y.o.-year-old male admitted 5/11/2018 with acute right upper and lower extremity weakness possibly due to seizure episode v. CVA.    Interval Problem Update  Pt with right facial droop and right upper and lower extremity paralysis.  No changes in comparison to yesterday.    Consultants/Specialty  Neurology    Disposition  TBD     Review of Systems   Constitutional: Negative.    HENT: Negative.    Eyes: Negative.    Respiratory: Negative.    Cardiovascular: Negative.    Gastrointestinal: Negative.    Musculoskeletal: Negative.    Skin: Negative.    Neurological: Positive for sensory change and focal weakness (RUE and RLE). Negative for seizures and headaches.      Physical Exam Laboratory/Imaging   Vitals:    05/12/18 1959 05/13/18 0400 05/13/18 0800 05/13/18 0918   BP: 136/61 106/49 (!) 97/65    Pulse: 82 62 (!) 49    Resp: 16 16 18    Temp: 36.8 °C (98.2 °F) 35.9 °C (96.7 °F) 36.2 °C (97.2 °F)    TempSrc:       SpO2: 91% 95% 95% 95%   Weight:       Height:         Physical Exam   Constitutional: He is oriented to person, place, and time. He appears well-developed and well-nourished. No distress.   HENT:   Head: Normocephalic and atraumatic.   Mouth/Throat: No oropharyngeal exudate.   Eyes: Conjunctivae and EOM are normal. Pupils are equal, round, and reactive to light. No scleral icterus.   Neck: Normal range of motion. Neck supple.   Cardiovascular: Exam reveals no gallop and no friction rub.    No murmur heard.  Pulmonary/Chest: Effort normal and breath sounds normal.   Abdominal: Soft. Bowel sounds are normal. He exhibits no distension. There is no tenderness.   Musculoskeletal: He exhibits no edema, tenderness or deformity.   Neurological: He is alert and oriented to person, place, and time. A sensory deficit (involving right face, RUE and RLE) is present.   Right facial droop   Skin: Skin is warm and dry.   Psychiatric: He has  a normal mood and affect.   Nursing note and vitals reviewed.   Lab Results   Component Value Date/Time    WBC 8.9 05/13/2018 04:32 AM    HEMOGLOBIN 13.8 (L) 05/13/2018 04:32 AM    HEMATOCRIT 41.0 (L) 05/13/2018 04:32 AM    PLATELETCT 226 05/13/2018 04:32 AM     Lab Results   Component Value Date/Time    SODIUM 138 05/13/2018 04:32 AM    POTASSIUM 3.9 05/13/2018 04:32 AM    CHLORIDE 104 05/13/2018 04:32 AM    CO2 23 05/13/2018 04:32 AM    GLUCOSE 121 (H) 05/13/2018 04:32 AM    BUN 18 05/13/2018 04:32 AM    CREATININE 0.79 05/13/2018 04:32 AM      Assessment/Plan    Seizure (HCC)- (present on admission)   Assessment & Plan    - repeats multiple episodes of recent (3d ago and 2d ago)  - on Depakote and Keppra as outpt; VPA levels wnl  - EEG pending  - Neuro consult pending to consider med adjustments        Weakness of right side of body- (present on admission)   Assessment & Plan    This is recurrent episodes since 2012. Usually MRI does not show any acute findings but with significant white matter disease  - MRI revealed no acute findings; EEG pending  - CVA workup: ECHO unremarkable; all other studies unremarkable  - PT/OT eval and recs appreciated  - Neurology consulted and appreciate recs; viral titers pending        Hypothyroidism- (present on admission)   Assessment & Plan    TSH at 29,000.  Increased dose of levothyroxine  - may consider IV loading; will discuss with Pharmacy  - could be contributing to neuro deficits        Type 2 diabetes mellitus with hyperglycemia, with long-term current use of insulin (HCC)- (present on admission)   Assessment & Plan    Continue home regimen. Hold metformin. Insulin sliding scale.  A1c 8.9        Psychiatric problem- (present on admission)   Assessment & Plan    Continue psychiatric medications.           Quality-Core Measures

## 2018-05-13 NOTE — CARE PLAN
Problem: Communication  Goal: The ability to communicate needs accurately and effectively will improve  Outcome: PROGRESSING AS EXPECTED     Patient encouraged and educated on the importance of communicating with care providers. Patient states understanding. Call light use given. Patient calls for assistance.    Problem: Safety  Goal: Will remain free from injury  Outcome: PROGRESSING AS EXPECTED      Problem: Knowledge Deficit  Goal: Knowledge of disease process/condition, treatment plan, diagnostic tests, and medications will improve  Outcome: PROGRESSING AS EXPECTED

## 2018-05-13 NOTE — CARE PLAN
Problem: Safety  Goal: Will remain free from injury  Outcome: PROGRESSING AS EXPECTED  High fall risk.  Bed alarm in place.  Educated on calling for assistance.

## 2018-05-13 NOTE — THERAPY
"Physical Therapy Evaluation completed.   Bed Mobility:  Supine to Sit: Maximal Assist  Transfers: Sit to Stand: Unable to Participate  Gait: Level Of Assist: Unable to Participate with Will Continue to Assess for Equipment Needs       Plan of Care: Will benefit from Physical Therapy 5 times per week  Discharge Recommendations: Equipment: Will Continue to Assess for Equipment Needs. Post-acute therapy Discharge to a transitional care facility for continued skilled therapy services.    See \"Rehab Therapy-Acute\" Patient Summary Report for complete documentation.     "

## 2018-05-13 NOTE — PROGRESS NOTES
Received report from am RN Trish at bedside, accepted care . Pt is asleep, breathing normal, shows no signs of distress. No needs at this time.  Bed in low position call bell within reach, half side rails up, Bed alarm on. Pt resting quietly. Hourly rounds Will continue to assess.

## 2018-05-13 NOTE — THERAPY
"  Speech Language Therapy Clinical Swallow Evaluation completed.  Functional Status: Patient seen this date for CSE in the setting of seizure v. CVA. Patient was awake and agreeable to assessment. He reported that he lives in a group home, but is responsible for making his own meals. He stated his fiance helps him as well. He reports gets around town independently using the bus system. Oral mech exam revealed limited ROM and reduced strength of the mandible and tongue. Deviation of the tongue to the Left at rest. Swallow palpated as complete. Dentition is poor. Right sided paralysis appreciated in the arm and leg. Patient also reported feeling numbness of the right cheek and nose. Patient declined PO trials of ice stating his teeth are sensitive. He took sips of room temperature water from a cup and straw with out s/sx of aspiration or difficulty. He was seen during his lunch-time meal with a regular, low sodium/ thin liquid tray, consisting of soft mechanicals and thin liquids. He had difficulty getting bites of the food from the tray to into his mouth d/t limited mandibular ROM. He also demonstrated difficulty with cutting up larger bites of food d/t paralysis of the Right arm. He require mod support for tray set up and preparation. With softer mechanical (beef stroganoff) he was noted to mash the food and then swallow it, likely whole. He stated this is \"easier than chewing\". With a firmer soft mechancial, some minimal mastication was noted. Discussed options with patient and he was agreeable to a Dysphagia 2/thin liquid diet.     Recommendations - Diet: Diet / Liquid Recommendation: Dysphagia II, Thin Liquid                          Strategies: Assistance needed for meal tray set-up and Head of Bed at 90 Degrees                          Medication Administration: Medication Administration : Whole with Liquid Wash, Float Whole with Puree    Plan of Care: Will benefit from Speech Therapy 3 times per " "week    Post-Acute Therapy: Discharge to a transitional care facility for continued skilled therapy services.    See \"Rehab Therapy-Acute\" Patient Summary Report for complete documentation.   "

## 2018-05-14 LAB
CORTIS SERPL-MCNC: 6.6 UG/DL (ref 0–23)
GLUCOSE BLD-MCNC: 107 MG/DL (ref 65–99)
GLUCOSE BLD-MCNC: 114 MG/DL (ref 65–99)
GLUCOSE BLD-MCNC: 132 MG/DL (ref 65–99)
T3FREE SERPL-MCNC: 2.79 PG/ML (ref 2.4–4.2)
T4 FREE SERPL-MCNC: 0.63 NG/DL (ref 0.53–1.43)
TSH SERPL DL<=0.005 MIU/L-ACNC: 24.83 UIU/ML (ref 0.38–5.33)

## 2018-05-14 PROCEDURE — 92526 ORAL FUNCTION THERAPY: CPT

## 2018-05-14 PROCEDURE — 92523 SPEECH SOUND LANG COMPREHEN: CPT

## 2018-05-14 PROCEDURE — 84443 ASSAY THYROID STIM HORMONE: CPT

## 2018-05-14 PROCEDURE — 36415 COLL VENOUS BLD VENIPUNCTURE: CPT

## 2018-05-14 PROCEDURE — G8997 SWALLOW GOAL STATUS: HCPCS | Mod: CH

## 2018-05-14 PROCEDURE — 84439 ASSAY OF FREE THYROXINE: CPT

## 2018-05-14 PROCEDURE — 95951 EEG: CPT | Mod: 52

## 2018-05-14 PROCEDURE — 99233 SBSQ HOSP IP/OBS HIGH 50: CPT | Performed by: INTERNAL MEDICINE

## 2018-05-14 PROCEDURE — 700102 HCHG RX REV CODE 250 W/ 637 OVERRIDE(OP): Performed by: INTERNAL MEDICINE

## 2018-05-14 PROCEDURE — 82533 TOTAL CORTISOL: CPT

## 2018-05-14 PROCEDURE — 700111 HCHG RX REV CODE 636 W/ 250 OVERRIDE (IP): Performed by: INTERNAL MEDICINE

## 2018-05-14 PROCEDURE — 770020 HCHG ROOM/CARE - TELE (206)

## 2018-05-14 PROCEDURE — G8996 SWALLOW CURRENT STATUS: HCPCS | Mod: CI

## 2018-05-14 PROCEDURE — 84481 FREE ASSAY (FT-3): CPT

## 2018-05-14 PROCEDURE — A9270 NON-COVERED ITEM OR SERVICE: HCPCS | Performed by: INTERNAL MEDICINE

## 2018-05-14 PROCEDURE — 82962 GLUCOSE BLOOD TEST: CPT

## 2018-05-14 RX ADMIN — DIVALPROEX SODIUM 1000 MG: 500 TABLET, DELAYED RELEASE ORAL at 09:49

## 2018-05-14 RX ADMIN — INSULIN GLARGINE 20 UNITS: 100 INJECTION, SOLUTION SUBCUTANEOUS at 21:24

## 2018-05-14 RX ADMIN — SERTRALINE 100 MG: 100 TABLET, FILM COATED ORAL at 09:48

## 2018-05-14 RX ADMIN — ACETAMINOPHEN 650 MG: 325 TABLET, FILM COATED ORAL at 09:50

## 2018-05-14 RX ADMIN — LEVETIRACETAM 500 MG: 500 TABLET ORAL at 09:49

## 2018-05-14 RX ADMIN — LEVOTHYROXINE SODIUM 150 MCG: 150 TABLET ORAL at 05:31

## 2018-05-14 RX ADMIN — GLIMEPIRIDE 1 MG: 2 TABLET ORAL at 09:49

## 2018-05-14 RX ADMIN — ACETAMINOPHEN 650 MG: 325 TABLET, FILM COATED ORAL at 18:12

## 2018-05-14 RX ADMIN — DIVALPROEX SODIUM 1000 MG: 500 TABLET, DELAYED RELEASE ORAL at 21:26

## 2018-05-14 RX ADMIN — ENOXAPARIN SODIUM 40 MG: 100 INJECTION SUBCUTANEOUS at 09:48

## 2018-05-14 RX ADMIN — ASPIRIN 325 MG: 325 TABLET ORAL at 09:49

## 2018-05-14 RX ADMIN — LEVETIRACETAM 500 MG: 500 TABLET ORAL at 21:25

## 2018-05-14 RX ADMIN — ATORVASTATIN CALCIUM 80 MG: 80 TABLET, FILM COATED ORAL at 21:25

## 2018-05-14 RX ADMIN — STANDARDIZED SENNA CONCENTRATE AND DOCUSATE SODIUM 2 TABLET: 8.6; 5 TABLET, FILM COATED ORAL at 09:49

## 2018-05-14 ASSESSMENT — ENCOUNTER SYMPTOMS
CONSTITUTIONAL NEGATIVE: 1
SEIZURES: 0
RESPIRATORY NEGATIVE: 1
HEADACHES: 0
GASTROINTESTINAL NEGATIVE: 1
EYES NEGATIVE: 1
SENSORY CHANGE: 1
CARDIOVASCULAR NEGATIVE: 1
FOCAL WEAKNESS: 1
MUSCULOSKELETAL NEGATIVE: 1

## 2018-05-14 ASSESSMENT — PAIN SCALES - GENERAL
PAINLEVEL_OUTOF10: 6
PAINLEVEL_OUTOF10: 0

## 2018-05-14 NOTE — THERAPY
"Speech Language Therapy dysphagia tx and Evaluation completed to address speech and cognition  Functional Status:  Re swallow, pt is appropriate for current diet, with ok for omelet.  Pt demos appropriate rate and amt of self fdg but required cues for fully upright position during po (min cues to recall posted strategies).  Re cogling evalutation,  Pt admitted sz vs cva, with RUE/RLE weakness.  Pt with a history of bipolar and developmental disorder, with group/transitional care prior to admit, who currently presents with slow speech rate and sp lang, cogling deficits.  Query pt's baseline status.  Pt is able to adequately describe recent events but has mod deficits on STM for random words, concrete reasoning and reduced generative naming.  Pt reports current delayed speech rate is of new onset. Will attempt to verify pt's self report.  Recommendations:  1) Continue SLP for dysphagia and cogling/lang management, 2) Collaborate with family re baseline fxn.  Plan of Care: Will benefit from Speech Therapy 3 times per week  Post-Acute Therapy: Discharge to a transitional care facility for continued skilled therapy services.    See \"Rehab Therapy-Acute\" Patient Summary Report for complete documentation.   "

## 2018-05-14 NOTE — PROGRESS NOTES
Received report from am OMID Vang at bedside, accepted care . Pt is calm and alert, N/T and weakness same from baseline, shows no signs of distress. Negative for headache, no N/V, Pt is breathing normally. No SOB, no, chest pain. Present bowel sounds and pt passing gas. Bowel protocol in place. Pt denies pain, No needs at this time. POC discussed. Bed in low position call bell within reach, half side rails up, Bed alarm on. Pt resting quietly. Will continue to assess.

## 2018-05-14 NOTE — THERAPY
Attempted PT txt; however pt declined to work with therapy today, as pt reports of nausea with mobility at this time. Pt reports he would like to attempt therapy once POC has been established medically. Will re-attempt as appropraite.

## 2018-05-14 NOTE — CARE PLAN
Problem: Venous Thromboembolism (VTW)/Deep Vein Thrombosis (DVT) Prevention:  Goal: Patient will participate in Venous Thrombosis (VTE)/Deep Vein Thrombosis (DVT)Prevention Measures  Pharmacological DVT prophylaxis in place.

## 2018-05-14 NOTE — PROGRESS NOTES
Pt A&Ox4, numbness in R side of the body, denies any tingling, pain is 6/10 (pain medication given).    Bed alarm on, call light within reach, pt educated on importance of using the call light when he needs assistance, pt verbalized understanding.

## 2018-05-14 NOTE — PROGRESS NOTES
Ayah from [facility] contacted RN to get updates. Update provided. We can call her at [phone] for any assistance.

## 2018-05-14 NOTE — CARE PLAN
Problem: Knowledge Deficit  Goal: Knowledge of disease process/condition, treatment plan, diagnostic tests, and medications will improve  Pt updated on plan of care.

## 2018-05-14 NOTE — DISCHARGE PLANNING
Medical Social Work    Referral:  Rounds    Intervention:  Pt is new to the floor. SW reviewed pt's chart. SW needs TBD    Plan:  SW will remain available for dc planning

## 2018-05-14 NOTE — PROGRESS NOTES
Pt shows no distress, AO4, breathing normal, Safety precautions in place. Tolerating interventions, Will continue to assess

## 2018-05-14 NOTE — PROGRESS NOTES
Hospital Medicine Interval Note  Date of Service:  5/14/2018    Chief Complaint  37 y.o.-year-old male admitted 5/11/2018 with acute right upper and lower extremity weakness possibly due to seizure episode v. CVA.    Interval Problem Update  Pt with right facial droop and right upper and lower extremity paralysis.  Slight improvement with RLE weakness noted.     Consultants/Specialty  Neurology    Disposition  TBD     Review of Systems   Constitutional: Negative.    HENT: Negative.    Eyes: Negative.    Respiratory: Negative.    Cardiovascular: Negative.    Gastrointestinal: Negative.    Musculoskeletal: Negative.    Skin: Negative.    Neurological: Positive for sensory change and focal weakness (RUE and RLE). Negative for seizures and headaches.      Physical Exam Laboratory/Imaging   Vitals:    05/13/18 1600 05/13/18 2000 05/14/18 0400 05/14/18 0545   BP: 102/63 109/70 (!) 91/35    Pulse: (!) 53 62 (!) 42    Resp: 18 18 18    Temp: 36.7 °C (98 °F) 36.2 °C (97.1 °F) (!) 35.7 °C (96.3 °F) 36.1 °C (97 °F)   TempSrc:       SpO2: 91% 92% 95%    Weight:       Height:         Physical Exam   Constitutional: He is oriented to person, place, and time. He appears well-developed and well-nourished. No distress.   HENT:   Head: Normocephalic and atraumatic.   Mouth/Throat: No oropharyngeal exudate.   Eyes: Conjunctivae and EOM are normal. Pupils are equal, round, and reactive to light. No scleral icterus.   Neck: Normal range of motion. Neck supple.   Cardiovascular: Exam reveals no gallop and no friction rub.    No murmur heard.  Pulmonary/Chest: Effort normal and breath sounds normal.   Abdominal: Soft. Bowel sounds are normal. He exhibits no distension. There is no tenderness.   Musculoskeletal: He exhibits no edema, tenderness or deformity.   Neurological: He is alert and oriented to person, place, and time. A sensory deficit (involving right face, RUE and RLE) is present.   Right facial droop and decreased motor  function/strength in RLE and RUE.   Skin: Skin is warm and dry.   Psychiatric: He has a normal mood and affect.   Nursing note and vitals reviewed.   Lab Results   Component Value Date/Time    WBC 8.9 05/13/2018 04:32 AM    HEMOGLOBIN 13.8 (L) 05/13/2018 04:32 AM    HEMATOCRIT 41.0 (L) 05/13/2018 04:32 AM    PLATELETCT 226 05/13/2018 04:32 AM     Lab Results   Component Value Date/Time    SODIUM 138 05/13/2018 04:32 AM    POTASSIUM 3.9 05/13/2018 04:32 AM    CHLORIDE 104 05/13/2018 04:32 AM    CO2 23 05/13/2018 04:32 AM    GLUCOSE 121 (H) 05/13/2018 04:32 AM    BUN 18 05/13/2018 04:32 AM    CREATININE 0.79 05/13/2018 04:32 AM      Assessment/Plan    Seizure (HCC)- (present on admission)   Assessment & Plan    - repeats multiple episodes of recent (3d ago and 2d ago)  - on Depakote and Keppra as outpt; VPA levels wnl  - EEG pending  - Neuro recs appreciated and following        Weakness of right side of body- (present on admission)   Assessment & Plan    This is recurrent episodes since 2012. Usually MRI does not show any acute findings but with significant white matter disease  - MRI suggestive of PML; EEG pending  - CVA workup: ECHO unremarkable; all other studies unremarkable  - PT/OT eval and recs appreciated  - Neurology consulted and appreciate recs  - ALEXIS virus titers pending; HIV negative        Hypothyroidism- (present on admission)   Assessment & Plan    TSH at 29,000.  Increased dose of levothyroxine  - repeat TFTs and Cortisol levels pending; may consider loading with IV levothyroxine +/- adding Cytomel  - could be contributing to neuro deficits        Type 2 diabetes mellitus with hyperglycemia, with long-term current use of insulin (HCC)- (present on admission)   Assessment & Plan    Continue home regimen. Hold metformin. Insulin sliding scale.  A1c 8.9        Psychiatric problem- (present on admission)   Assessment & Plan    Continue psychiatric medications.           Quality-Core Measures

## 2018-05-14 NOTE — CARE PLAN
Problem: Safety  Goal: Will remain free from injury  Outcome: PROGRESSING AS EXPECTED      Bed alarmed, safety precautions in place, pt calls appropriately, call light in reach    Problem: Venous Thromboembolism (VTW)/Deep Vein Thrombosis (DVT) Prevention:  Goal: Patient will participate in Venous Thrombosis (VTE)/Deep Vein Thrombosis (DVT)Prevention Measures  Outcome: PROGRESSING AS EXPECTED      SCD's in use, Pt AO4, breaths normal, no noted dvt/thrombus/embolus, no unilateral leg pain or swelling, will continue to monitor

## 2018-05-15 LAB
ANNOTATION COMMENT IMP: NORMAL
CD3 CELLS # BLD: 1922 CELLS/UL (ref 570–2400)
CD3+CD4+ CELLS # BLD: 1026 CELLS/UL (ref 430–1800)
CD3+CD4+ CELLS/CD3+CD8+ CLL BLD: 1.16 RATIO (ref 0.8–3.9)
CD3+CD8+ CELLS # BLD: 887 CELLS/UL (ref 210–1200)
CORTIS SERPL-MCNC: 6 UG/DL (ref 0–23)
GLUCOSE BLD-MCNC: 106 MG/DL (ref 65–99)
GLUCOSE BLD-MCNC: 111 MG/DL (ref 65–99)
GLUCOSE BLD-MCNC: 111 MG/DL (ref 65–99)
GLUCOSE BLD-MCNC: 132 MG/DL (ref 65–99)

## 2018-05-15 PROCEDURE — 700111 HCHG RX REV CODE 636 W/ 250 OVERRIDE (IP): Performed by: INTERNAL MEDICINE

## 2018-05-15 PROCEDURE — 82533 TOTAL CORTISOL: CPT

## 2018-05-15 PROCEDURE — A9270 NON-COVERED ITEM OR SERVICE: HCPCS | Performed by: INTERNAL MEDICINE

## 2018-05-15 PROCEDURE — 700102 HCHG RX REV CODE 250 W/ 637 OVERRIDE(OP): Performed by: INTERNAL MEDICINE

## 2018-05-15 PROCEDURE — 97530 THERAPEUTIC ACTIVITIES: CPT

## 2018-05-15 PROCEDURE — 770020 HCHG ROOM/CARE - TELE (206)

## 2018-05-15 PROCEDURE — 36415 COLL VENOUS BLD VENIPUNCTURE: CPT

## 2018-05-15 PROCEDURE — 99233 SBSQ HOSP IP/OBS HIGH 50: CPT | Performed by: HOSPITALIST

## 2018-05-15 PROCEDURE — 82962 GLUCOSE BLOOD TEST: CPT

## 2018-05-15 RX ADMIN — ATORVASTATIN CALCIUM 80 MG: 80 TABLET, FILM COATED ORAL at 20:59

## 2018-05-15 RX ADMIN — ACETAMINOPHEN 650 MG: 325 TABLET, FILM COATED ORAL at 19:23

## 2018-05-15 RX ADMIN — LEVETIRACETAM 500 MG: 500 TABLET ORAL at 10:00

## 2018-05-15 RX ADMIN — SERTRALINE 100 MG: 100 TABLET, FILM COATED ORAL at 10:00

## 2018-05-15 RX ADMIN — ENOXAPARIN SODIUM 40 MG: 100 INJECTION SUBCUTANEOUS at 09:59

## 2018-05-15 RX ADMIN — ASPIRIN 325 MG: 325 TABLET ORAL at 09:59

## 2018-05-15 RX ADMIN — LEVETIRACETAM 500 MG: 500 TABLET ORAL at 20:59

## 2018-05-15 RX ADMIN — DIVALPROEX SODIUM 1000 MG: 500 TABLET, DELAYED RELEASE ORAL at 20:59

## 2018-05-15 RX ADMIN — DIVALPROEX SODIUM 1000 MG: 500 TABLET, DELAYED RELEASE ORAL at 10:00

## 2018-05-15 RX ADMIN — LEVOTHYROXINE SODIUM 150 MCG: 150 TABLET ORAL at 05:48

## 2018-05-15 RX ADMIN — INSULIN GLARGINE 20 UNITS: 100 INJECTION, SOLUTION SUBCUTANEOUS at 21:03

## 2018-05-15 RX ADMIN — GLIMEPIRIDE 1 MG: 2 TABLET ORAL at 10:00

## 2018-05-15 ASSESSMENT — COGNITIVE AND FUNCTIONAL STATUS - GENERAL
CLIMB 3 TO 5 STEPS WITH RAILING: TOTAL
STANDING UP FROM CHAIR USING ARMS: A LOT
TURNING FROM BACK TO SIDE WHILE IN FLAT BAD: A LOT
WALKING IN HOSPITAL ROOM: TOTAL
MOVING TO AND FROM BED TO CHAIR: A LOT
MOBILITY SCORE: 10
MOVING FROM LYING ON BACK TO SITTING ON SIDE OF FLAT BED: A LOT
SUGGESTED CMS G CODE MODIFIER MOBILITY: CL

## 2018-05-15 ASSESSMENT — ENCOUNTER SYMPTOMS
CARDIOVASCULAR NEGATIVE: 1
HEADACHES: 0
RESPIRATORY NEGATIVE: 1
SEIZURES: 0
SENSORY CHANGE: 1
MUSCULOSKELETAL NEGATIVE: 1
FOCAL WEAKNESS: 1
EYES NEGATIVE: 1
GASTROINTESTINAL NEGATIVE: 1
CONSTITUTIONAL NEGATIVE: 1

## 2018-05-15 ASSESSMENT — PAIN SCALES - GENERAL
PAINLEVEL_OUTOF10: 8
PAINLEVEL_OUTOF10: 0
PAINLEVEL_OUTOF10: 5
PAINLEVEL_OUTOF10: 0
PAINLEVEL_OUTOF10: 5
PAINLEVEL_OUTOF10: 0

## 2018-05-15 ASSESSMENT — GAIT ASSESSMENTS: GAIT LEVEL OF ASSIST: UNABLE TO PARTICIPATE

## 2018-05-15 NOTE — THERAPY
"Pt w/impaired balance, gait, strength, coordination, and activity tolerance. Pt demonstrated poor coordination of R UE and LE, requiring verbal cuing and compensatory strategies. Pt w/ability to initate grasp and wt shift, though poor follow through w/R LE, requiring verbal and visual cues. Pt would benefit from further acute PT txs to progress towards goals and independence. Would recommend post acute placement to address deficits.    Physical Therapy Treatment completed.   Bed Mobility:  Supine to Sit: Stand by Assist  Transfers: Sit to Stand: Contact Guard Assist  Gait: Level Of Assist: Unable to Participate        Plan of Care: Will benefit from Physical Therapy 4 times per week    See \"Rehab Therapy-Acute\" Patient Summary Report for complete documentation.       "

## 2018-05-15 NOTE — PROGRESS NOTES
Hospital Medicine Interval Note  Date of Service:  5/15/2018    Chief Complaint  This is a 37 -year-old male admitted 5/11/2018 with acute right upper and lower extremity weakness     Interval Problem Update  Patient reports ongoing right upper extremity weakness, no witnessed seizure activity, denies pain  Axox3    Consultants/Specialty  Neurology    Disposition  TBD     Review of Systems   Constitutional: Negative.    HENT: Negative.    Eyes: Negative.    Respiratory: Negative.    Cardiovascular: Negative.    Gastrointestinal: Negative.    Musculoskeletal: Negative.    Skin: Negative.    Neurological: Positive for sensory change and focal weakness (RUE and RLE). Negative for seizures and headaches.      Physical Exam Laboratory/Imaging   Vitals:    05/14/18 2351 05/15/18 0400 05/15/18 0800 05/15/18 1200   BP: 106/68 101/55 (!) 99/57 107/67   Pulse: 63 (!) 56 (!) 55 61   Resp: 16 16 16 16   Temp: 36.1 °C (97 °F) 36.1 °C (97 °F) 36.6 °C (97.8 °F) 36.6 °C (97.8 °F)   TempSrc:       SpO2: 92% 93% 94% 94%   Weight:       Height:         Physical Exam   Constitutional: He is oriented to person, place, and time. He appears well-developed and well-nourished. No distress.   HENT:   Head: Normocephalic and atraumatic.   Mouth/Throat: No oropharyngeal exudate.   Eyes: Conjunctivae and EOM are normal. Pupils are equal, round, and reactive to light. No scleral icterus.   Neck: Normal range of motion. Neck supple.   Cardiovascular: Exam reveals no gallop and no friction rub.    No murmur heard.  Pulmonary/Chest: Effort normal and breath sounds normal.   Abdominal: Soft. Bowel sounds are normal. He exhibits no distension. There is no tenderness.   Musculoskeletal: He exhibits no edema, tenderness or deformity.   Neurological: He is alert and oriented to person, place, and time. A sensory deficit (RUE) is present.   RUE weakness, RLE strength intact   Skin: Skin is warm and dry.   Psychiatric: He has a normal mood and affect.    Nursing note and vitals reviewed.   Lab Results   Component Value Date/Time    WBC 8.9 05/13/2018 04:32 AM    HEMOGLOBIN 13.8 (L) 05/13/2018 04:32 AM    HEMATOCRIT 41.0 (L) 05/13/2018 04:32 AM    PLATELETCT 226 05/13/2018 04:32 AM     Lab Results   Component Value Date/Time    SODIUM 138 05/13/2018 04:32 AM    POTASSIUM 3.9 05/13/2018 04:32 AM    CHLORIDE 104 05/13/2018 04:32 AM    CO2 23 05/13/2018 04:32 AM    GLUCOSE 121 (H) 05/13/2018 04:32 AM    BUN 18 05/13/2018 04:32 AM    CREATININE 0.79 05/13/2018 04:32 AM      Assessment/Plan    Seizure (HCC)- (present on admission)   Assessment & Plan      - on Depakote and Keppra as outpt; VPA levels wnl  - Neuro following        Weakness of right side of body- (present on admission)   Assessment & Plan    This is recurrent episodes since 2012. Usually MRI does not show any acute findings but with significant white matter disease  - MRI suggestive of PML  - CVA workup: unremarkable  - Neurology following  - Etiology unclear  - Today RLE strength intact on my exam  - ALEXIS virus titers pending; HIV negative        Hypothyroidism- (present on admission)   Assessment & Plan    TSH at 29,000.  Dose of levothyroxine increased  - could be contributing to neuro deficits  - will need repeat thyroid function tests in 4 to 6 weeks        Type 2 diabetes mellitus with hyperglycemia, with long-term current use of insulin (HCC)- (present on admission)   Assessment & Plan    Continue SSI        Psychiatric problem- (present on admission)   Assessment & Plan    Continue psychiatric medications.           Quality-Core Measures

## 2018-05-15 NOTE — PROGRESS NOTES
Patient given snack with HS meds. Has slept the remainder of the night. Turns independently in bed. SCD in place. Call light in reach. Denies pain.

## 2018-05-15 NOTE — DISCHARGE PLANNING
Transitional Care Navigator:    TCN met with patient to discuss transitional care services for discharge planning. [facility] lives [facility].  Pt reports needing to be independent with ADL's prior to returning. SNF level of care has been recommended.  Pt has been to SNF before and is in agreement. Choice is [facility].  Choice form completed and faxed to [facility].    SW aware.  TCN will follow-up as needed.

## 2018-05-15 NOTE — CARE PLAN
Problem: Venous Thromboembolism (VTW)/Deep Vein Thrombosis (DVT) Prevention:  Goal: Patient will participate in Venous Thrombosis (VTE)/Deep Vein Thrombosis (DVT)Prevention Measures  Outcome: PROGRESSING AS EXPECTED      Problem: Mobility  Goal: Risk for activity intolerance will decrease  Outcome: PROGRESSING SLOWER THAN EXPECTED  Patient independent in bed mobility. However, he has declined out of bed activity. Encouraged to at least sit at edge of bed with staff assist and participate with therapies today.    Problem: Pain Management  Goal: Pain level will decrease to patient's comfort goal  Outcome: PROGRESSING AS EXPECTED  Denies pain.

## 2018-05-15 NOTE — CONSULTS
Neurology Consultation        Referring Physician:  Dr. nAiya Corado     Referral Reason:  Seizure, right sided weakness    HPI:  Norm is a 37 year old man known to me from a previous hospital admission.  He has a history of recurrent seizures as well as recurrent episodes of right sided weakness without clear MRI correlate.  He has a documented extensive leukoencephalopathy on MRI brain that has been present and unchanged since at least 2012.  Etiology of his seizures and recurrent right sided weakness remains elusive.  He reports this time he was at the gym getting ready to exercise with his fiancee when he noted some tingling on the right side of his body.  His fiancee stated he didn't look good and he looked pale.  She brought him to the hospital on 5/11.  Since that time he has had persistent weakness on the right side of his body.  He states today that he can barely move his right fingers and that is all he can do.  The right arm and leg remain tingly and this is quite uncomfortable for him.  Initially in the ED they reported antigravity strength.    He reports a history of seizures and states the last one was 3 days prior to admission.  There was no seizure at the onset of his current symptoms.  He states he is compliant with his medications.  He reports seeing a neurologist at [facility] in the past but he cannot remember her name and states he was dismissed from her clinic due to missed appointments.    Past Medical History:  Active Ambulatory Problems     Diagnosis Date Noted   • Asthma 08/15/2011   • Bradycardia 08/15/2011   • Hemiparesis (HCC) 08/15/2011   • Glaucoma 08/15/2011   • Hypothyroidism 08/15/2011   • Depression 08/15/2011   • Paralysis on one side of body (HCC) 08/05/2012   • Weakness of right side of body 10/02/2012   • Thyroid mass of unclear etiology 12/06/2012   • Conversion disorder 03/15/2013   • Anemia 05/03/2013   • Right sided weakness 08/28/2013   • Psychiatric problem    •  "Acute right-sided weakness 04/17/2014   • Syncope 07/27/2014   • Headache 08/06/2014   • Patient non adherence 10/16/2014   • Seizure ([facility]) 12/01/2014   • Weakness 12/01/2014   • Bipolar disorder ([facility]) 12/02/2014   • Change in mental status 05/26/2015   • Pansinusitis 05/27/2015   • Depression 03/06/2016   • PTSD (post-traumatic stress disorder) 03/06/2016   • Right hemiparesis ([facility]) 09/12/2017   • Hyperglycemia 09/12/2017   • Type 2 diabetes mellitus with hyperglycemia, with long-term current use of insulin ([facility]) 02/02/2018   • Pre-ulcerative corn or callous 02/04/2018     Resolved Ambulatory Problems     Diagnosis Date Noted   • Hyperosmolar non-ketotic state in patient with type 2 diabetes mellitus ([facility]) 02/02/2018     Past Medical History:   Diagnosis Date   • Anxiety    • ASTHMA    • Bipolar 1 disorder ([facility])    • Depression    • Fall    • Glaucoma    • Glaucoma 1982   • Hypothyroidism    • Indigestion    • Mental disorder    • Murmur    • Pneumonia    • Psychiatric problem 2002   • S/P thyroidectomy    • Seizure ([facility]) 2010   • Seizure disorder ([facility])    • Unspecified disorder of thyroid        Medications:  See EMR    Allergies:  Allergies   Allergen Reactions   • Abilify Unspecified     \"Feeling tired, like I don't even know whats going on around me\"   • Fish      Pt reports fish causes him to be sick to his stomach         Social History:  Social History     Social History   • Marital status: Single     Spouse name: N/A   • Number of children: N/A   • Years of education: N/A     Occupational History   • Not on file.     Social History Main Topics   • Smoking status: Current Every Day Smoker     Packs/day: 0.25     Years: 4.00     Types: Cigarettes, Cigars     Last attempt to quit: 1/1/2014   • Smokeless tobacco: Never Used   • Alcohol use No   • Drug use: Yes     Types: Inhaled      Comment: marijuana   • Sexual activity: Not on file     Other Topics Concern   • Not on file     Social History Narrative   • No " narrative on file       Family History:  Family History   Problem Relation Age of Onset   • Hypertension Mother    • Heart Disease Mother    • Lung Disease Mother    • Stroke Maternal Grandmother        ROS:  He has an extensive rash which he relates to abilify, All others negative except for what was mentioned in the HPI.    Physical Exam:  Vitals:   Vitals:    05/14/18 2351 05/15/18 0400 05/15/18 0800 05/15/18 1200   BP: 106/68 101/55 (!) 99/57 107/67   Pulse: 63 (!) 56 (!) 55 61   Resp: 16 16 16 16   Temp: 36.1 °C (97 °F) 36.1 °C (97 °F) 36.6 °C (97.8 °F) 36.6 °C (97.8 °F)   TempSrc:       SpO2: 92% 93% 94% 94%   Weight:       Height:         GENERAL:  Lying in the hospital bed in no apparent distress.  MENTAL STATUS:  Awake, alert, oriented times 3.  Speech is fluent, comprehension is intact.  CRANIAL NERVES:  PERRL, EOMI with no nystagmus, face is symmetric, facial sensation is intact, tongue is in the midline, palate is symmetric.  MOTOR:  5/5 on the left side, he has 1/5 strength in the right upper and lower extremity with just a flicker of movement.  REFLEXES:  2+ and symmetric, toes are downgoing bilaterally  SENSATION:  Intact to light touch and proprioception throughout  COORDINATION:  Normal finger to nose and heel to shin bilaterally  GAIT:  Deferred    MRI brain:  (5/11/18)  1.  No evidence of acute territorial infarct, intracranial hemorrhage or mass lesion.  2.  Stable diffuse confluent periventricular white matter signal abnormality throughout both hemispheres consistent with leukoencephalopathy.  3.  Mild diffuse cerebral substance loss.  4.  Redemonstrated enlargement and elongation of the left globe with possible small herniation of the left superolateral aspect which could be consistent with a staphyloma.      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  He has seizures as well.  He also has recurrent episodes of right sided hemiplegia without obvious structural correlate on MRI that seem to resolve on their own without any medical treatment.        Recommendations:  1.  I would continue Depakote and Keppra at current doses.  No seizures have been witnessed in the hospital and it seems his seizure frequency is at its baseline.  2.  I think it is imperative that Norm establish care in the neurology clinic to develop a primary neurologist that can provide longitudinal care for this patient to further evaluate his leukodystrophy and manage his seizures.  He may benefit from genetic testing which is ideally done in the outpatient setting.  3.  Continue PT, OT and speech therapy.  4.  Will continue to follow.

## 2018-05-15 NOTE — DISCHARGE PLANNING
GABY faxed transport form to [facility] requesting transport for noon tomorrow 05/16/2018. Mary to contact [facility] to start insurance auth and set up transport after auth.

## 2018-05-15 NOTE — PROGRESS NOTES
Patient transferred to Neuroscience at this time. A&ox4. No complaints of acute pain or discomfort, just a mild headache. Vitals stable. Right sided weakness, blind in left eye. Reviewing orders at this time and initiating them. Safety precautions maintained.

## 2018-05-16 VITALS
RESPIRATION RATE: 16 BRPM | HEART RATE: 56 BPM | OXYGEN SATURATION: 93 % | HEIGHT: 78 IN | SYSTOLIC BLOOD PRESSURE: 96 MMHG | BODY MASS INDEX: 34.66 KG/M2 | TEMPERATURE: 97.6 F | DIASTOLIC BLOOD PRESSURE: 76 MMHG | WEIGHT: 299.61 LBS

## 2018-05-16 LAB
GLUCOSE BLD-MCNC: 107 MG/DL (ref 65–99)
GLUCOSE BLD-MCNC: 95 MG/DL (ref 65–99)

## 2018-05-16 PROCEDURE — 82962 GLUCOSE BLOOD TEST: CPT | Mod: 91

## 2018-05-16 PROCEDURE — 700111 HCHG RX REV CODE 636 W/ 250 OVERRIDE (IP): Performed by: INTERNAL MEDICINE

## 2018-05-16 PROCEDURE — A9270 NON-COVERED ITEM OR SERVICE: HCPCS | Performed by: INTERNAL MEDICINE

## 2018-05-16 PROCEDURE — 92526 ORAL FUNCTION THERAPY: CPT

## 2018-05-16 PROCEDURE — 99239 HOSP IP/OBS DSCHRG MGMT >30: CPT | Performed by: HOSPITALIST

## 2018-05-16 PROCEDURE — 700102 HCHG RX REV CODE 250 W/ 637 OVERRIDE(OP): Performed by: INTERNAL MEDICINE

## 2018-05-16 RX ORDER — ASPIRIN 325 MG
325 TABLET ORAL DAILY
Qty: 100 TAB
Start: 2018-05-17 | End: 2018-07-27

## 2018-05-16 RX ADMIN — ASPIRIN 325 MG: 325 TABLET ORAL at 10:40

## 2018-05-16 RX ADMIN — GLIMEPIRIDE 1 MG: 2 TABLET ORAL at 10:40

## 2018-05-16 RX ADMIN — SERTRALINE 100 MG: 100 TABLET, FILM COATED ORAL at 10:40

## 2018-05-16 RX ADMIN — LEVOTHYROXINE SODIUM 150 MCG: 150 TABLET ORAL at 05:57

## 2018-05-16 RX ADMIN — DIVALPROEX SODIUM 1000 MG: 500 TABLET, DELAYED RELEASE ORAL at 10:40

## 2018-05-16 RX ADMIN — ENOXAPARIN SODIUM 40 MG: 100 INJECTION SUBCUTANEOUS at 10:41

## 2018-05-16 RX ADMIN — LEVETIRACETAM 500 MG: 500 TABLET ORAL at 10:40

## 2018-05-16 ASSESSMENT — PAIN SCALES - GENERAL
PAINLEVEL_OUTOF10: 4
PAINLEVEL_OUTOF10: 4
PAINLEVEL_OUTOF10: 0
PAINLEVEL_OUTOF10: 4

## 2018-05-16 NOTE — PROGRESS NOTES
Monitor summary: SB 50-SR 76, WV .20, QRS .12, QT .44, with HR down to 45 non-sustaining per strip from monitor room.

## 2018-05-16 NOTE — DISCHARGE INSTRUCTIONS
Discharge Instructions    Discharged to other by medical transportation with escort. Discharged via wheelchair, hospital escort: Yes.  Special equipment needed: Not Applicable    Be sure to schedule a follow-up appointment with your primary care doctor or any specialists as instructed.     Discharge Plan:   Influenza Vaccine Indication: Not indicated: Previously immunized this influenza season and > 8 years of age    I understand that a diet low in cholesterol, fat, and sodium is recommended for good health. Unless I have been given specific instructions below for another diet, I accept this instruction as my diet prescription.   Other diet: Dysphagia 3 (mechanical soft), 2g Sodium, Diabetic     Special Instructions: None    · Is patient discharged on Warfarin / Coumadin?   No     Depression / Suicide Risk    As [facility] are [facility], it is important to learn how to keep safe from harming yourself.    Recognize the warning signs:  · Abrupt changes in personality, positive or negative- including increase in energy   · Giving away possessions  · Change in eating patterns- significant weight changes-  positive or negative  · Change in sleeping patterns- unable to sleep or sleeping all the time   · Unwillingness or inability to communicate  · Depression  · Unusual sadness, discouragement and loneliness  · Talk of wanting to die  · Neglect of personal appearance   · Rebelliousness- reckless behavior  · Withdrawal from people/activities they love  · Confusion- inability to concentrate     If you or a loved one observes any of these behaviors or has concerns about self-harm, here's what you can do:  · Talk about it- your feelings and reasons for harming yourself  · Remove any means that you might use to hurt yourself (examples: pills, rope, extension cords, firearm)  · Get professional help from the community (Mental Health, Substance Abuse, psychological counseling)  · Do not be alone:Call  your Safe Contact- someone whom you trust who will be there for you.  · Call your local CRISIS HOTLINE 594-9785 or 219-711-4702  · Call your local Children's Mobile Crisis Response Team [place] (035) 194-7037 or www.[employer]  · Call the toll free National Suicide Prevention Hotlines   · National Suicide Prevention Lifeline 713-913-FCLO (3068)  · National Cloud Direct Line Network 800-SUICIDE (178-7780)

## 2018-05-16 NOTE — EEG PROGRESS NOTE
VIDEO ELECTROENCEPHALOGRAM REPORT        Referring MD: Dr. Kent.      DOS:  5/14/2018 (total recording of 27 minutes).      INDICATION:  Norm Prabhakar 37 y.o. male presenting with right sided weakness. Rule out seizure.      CURRENT ANTIEPILEPTIC REGIMEN: Levetiracetam 500 mg po bid.      TECHNIQUE: 30 channel video electroencephalogram (EEG) was performed in accordance with the international 10-20 system. The study was reviewed in bipolar and referential montages. The recording examined the patient during wakeful and drowsy/sleep state(s).      DESCRIPTION OF THE RECORD:  During the wakefulness, the background showed a symmetrical 9 Hz alpha activity posteriorly with amplitude of 70 mV.  There was reactivity to eye closure/opening.  A normal anterior-posterior gradient was noted with faster beta frequencies seen anteriorly.  During drowsiness, theta/delta frequencies were seen.     During the sleep state, background shows diffuse high-amplitude 4-5 Hz delta activity.  Symmetrical high-amplitude sleep spindles and vertex sharps were seen in the leads over the central regions.      ACTIVATION PROCEDURES:    Intermittent Photic stimulation was performed in a stepwise fashion from 1 to 30 Hz, but failed to elicit any significant background changes.       ICTAL AND/OR INTERICTAL FINDINGS:   Frequent right posterior quadrant polyspikes, sharps and slow waves and intermittent slowing in same region. No clinical events or seizures were reported or recorded during the study.     EKG: sampling of the EKG recording demonstrated sinus rhythm.       INTERPRETATION:  This is an abnormal video EEG recording in the awake, drowsy, and sleep state(s).  Frequent epileptiform discharges arising from the right posterior quadrant, there is intermittent slowing in the same region. The findings increase risk for seizures and suggest underlying area of cortical irritability and structural abnormality. No seizures were captured during  the study. Clinical and radiological correlation is recommended.        Obi Becerril MD  Medical Director, Epilepsy and Neurodiagnostics.   Clinical  of Neurology [facility] [employer].   Diplomate in Neurology, Epilepsy, and Electrodiagnostic Medicine.   Office: [phone]  Fax: [phone]

## 2018-05-16 NOTE — PROCEDURES
VIDEO ELECTROENCEPHALOGRAM REPORT        Referring MD: Dr. Kent.      DOS:  5/14/2018 (total recording of 27 minutes).      INDICATION:  Norm Prabhakar 37 y.o. male presenting with right sided weakness. Rule out seizure.      CURRENT ANTIEPILEPTIC REGIMEN: Levetiracetam 500 mg po bid.      TECHNIQUE: 30 channel video electroencephalogram (EEG) was performed in accordance with the international 10-20 system. The study was reviewed in bipolar and referential montages. The recording examined the patient during wakeful and drowsy/sleep state(s).      DESCRIPTION OF THE RECORD:  During the wakefulness, the background showed a symmetrical 9 Hz alpha activity posteriorly with amplitude of 70 mV.  There was reactivity to eye closure/opening.  A normal anterior-posterior gradient was noted with faster beta frequencies seen anteriorly.  During drowsiness, theta/delta frequencies were seen.     During the sleep state, background shows diffuse high-amplitude 4-5 Hz delta activity.  Symmetrical high-amplitude sleep spindles and vertex sharps were seen in the leads over the central regions.      ACTIVATION PROCEDURES:    Intermittent Photic stimulation was performed in a stepwise fashion from 1 to 30 Hz, but failed to elicit any significant background changes.       ICTAL AND/OR INTERICTAL FINDINGS:   Frequent right posterior quadrant polyspikes, sharps and slow waves and intermittent slowing in same region. No clinical events or seizures were reported or recorded during the study.      EKG: sampling of the EKG recording demonstrated sinus rhythm.       INTERPRETATION:  This is an abnormal video EEG recording in the awake, drowsy, and sleep state(s).  Frequent epileptiform discharges arising from the right posterior quadrant, there is intermittent slowing in the same region. The findings increase risk for seizures and suggest underlying area of cortical irritability and structural abnormality. No seizures were captured during  the study. Clinical and radiological correlation is recommended.        Obi Becerril MD  Medical Director, Epilepsy and Neurodiagnostics.   Clinical  of Neurology [facility] [employer].   Diplomate in Neurology, Epilepsy, and Electrodiagnostic Medicine.   Office: [phone]  Fax: [phone]       DORIS AVITIA    DD:  05/16/2018 12:11:38  DT:  05/16/2018 13:18:34    D#:  [id number]  Job#:  [id number]

## 2018-05-16 NOTE — DISCHARGE SUMMARY
CHIEF COMPLAINT ON ADMISSION  Chief Complaint   Patient presents with   • Tingling       CODE STATUS  Full Code    HPI & HOSPITAL COURSE  Please see history and physical dictated by Dr. Chase on 5/11/18 for further details.  This is a 37 y.o. year old male with history of developmental delay, bipolar disorder, seizure disorder and diabetes who was admitted with acute right sided weakness.  He has reportedly had these symptoms intermittently in the past.  His work up did not reveal evidence of a stroke, and was not consistent with a tia as symptoms persisted for several days.  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  Patient was seen by neurology and they recommended that his current regimen be continued and that he be referred to outpatient neurology for further testing including genetic analysis.    He has only mild right sided weakness at this time and is being discharged to skilled nursing this afternoon.    Therefore, he is discharged in fair and stable condition for further post-acute management.     SPECIFIC OUTPATIENT FOLLOW-UP  Neurology  Skilled nursing    DISCHARGE PROBLEM LIST  Active Problems:    Seizure (HCC) POA: Yes    Hypothyroidism POA: Yes    Psychiatric problem POA: Yes      Overview: PTSD    Type 2 diabetes mellitus with hyperglycemia, with long-term current use of insulin (HCC) POA: Yes  Resolved Problems:    Weakness of right side of body POA: Yes    Physical Exam   Constitutional: He is oriented to person, place, and time. He appears well-developed and well-nourished. No distress.   HENT:   Head: Normocephalic and atraumatic.   Mouth/Throat: No oropharyngeal exudate.   Eyes: Conjunctivae and EOM are normal. Pupils are equal, round, and reactive to light. No scleral icterus.   Neck: Normal range of motion. Neck supple.   Cardiovascular: Exam reveals no gallop and no friction rub.    No murmur heard.  Pulmonary/Chest: Effort normal and breath sounds normal.   Abdominal: Soft. Bowel  sounds are normal. He exhibits no distension. There is no tenderness.   Musculoskeletal: He exhibits no edema, tenderness or deformity.   Neurological: He is alert and oriented to person, place, and time.   Mild right ue weakness - markedly improved vs yesterday  Skin: Skin is warm and dry.   Psychiatric: He has a normal mood and affect.   Nursing note and vitals reviewed.    FOLLOW UP  No future appointments.  [facility]  [address]  [place] [id number] [phone]    Schedule an appointment as soon as possible for a visit in 2 weeks  Follow Up    Phill Angel M.D.  [address] [facility] [address]  [place] [id number] [phone]    In 1 month        MEDICATIONS ON DISCHARGE   Norm Prabhakar   Home Medication Instructions WILLIAMS:[id number]    Printed on:05/16/18 1122   Medication Information                      aspirin (ASA) 325 MG Tab  Take 1 Tab by mouth every day.             atorvastatin (LIPITOR) 40 MG Tab  Take 40 mg by mouth every evening.             divalproex (DEPAKOTE) 500 MG Tablet Delayed Response  Take 1,000 mg by mouth 2 Times a Day.             glimepiride (AMARYL) 1 MG tablet  Take 1 Tab by mouth every morning.             Insulin Glargine (BASAGLAR KWIKPEN) 100 UNIT/ML Solution Pen-injector  Inject 20 Units as instructed every bedtime.             latanoprost (XALATAN) 0.005 % Solution  Place 1 Drop in both eyes every evening.             levETIRAcetam (KEPPRA) 500 MG Tab  Take 1 Tab by mouth 2 times a day.             levothyroxine (SYNTHROID) 125 MCG Tab  Take 1 Tab by mouth Every morning on an empty stomach.             metFORMIN (GLUCOPHAGE) 1000 MG tablet  Take 1 Tab by mouth 2 times a day, with meals.             sertraline (ZOLOFT) 100 MG Tab  Take 1 Tab by mouth every morning.                 DIET  Diabetic  Orders Placed This Encounter   Procedures   • DIET ORDER     Standing Status:   Standing     Number of Occurrences:   1     Order Specific Question:   Diet:      Answer:   2 Gram Sodium [7]     Comments:    2 Gram Sodium, Low Cholesterol Diet     Order Specific Question:   Texture/Fiber modifications:     Answer:   Dysphagia 3(Mechanical Soft)specify fluid consistency(question 6) [3]     Order Specific Question:   Consistency/Fluid modifications:     Answer:   Thin Liquids [3]       ACTIVITY  As tolerated.    LINES, DRAINS, AND WOUNDS  This is an automated list. Peripheral IVs will be removed prior to discharge.  PIV Group Left Forearm 22g (Active)   Line Secured Taped;Transparent 5/16/2018  8:00 AM   Site Condition / Description Assessed;Clean;Dry;Intact 5/16/2018  8:00 AM   Dressing Type / Description Transparent;Clean;Dry;Intact 5/16/2018  8:00 AM   Dressing Status Observed 5/16/2018  8:00 AM   Saline Locked Yes 5/16/2018  8:00 AM   Infiltration Grading Used by [facility] 0 5/16/2018  8:00 AM   Phlebitis Scale (Used by [facility]) 0 5/16/2018  8:00 AM                     MENTAL STATUS ON TRANSFER  Level of Consciousness: Alert  Orientation : Oriented x 4  Speech: Speech Clear    CONSULTATIONS  Neurology    PROCEDURES  Carotid Duplex ([facility] [place] [facility] Only) - Do not order if CTA - Neck done in ER   Final Result      Echocardiogram Comp W/O cont   Final Result      MR-BRAIN-W/O   Final Result      1.  No evidence of acute territorial infarct, intracranial hemorrhage or mass lesion.   2.  Stable diffuse confluent periventricular white matter signal abnormality throughout both hemispheres consistent with leukoencephalopathy.   3.  Mild diffuse cerebral substance loss.   4.  Redemonstrated enlargement and elongation of the left globe with possible small herniation of the left superolateral aspect which could be consistent with a staphyloma.      CT-CTA NECK WITH & W/O-POST PROCESSING   Final Result      CT angiogram of the neck within normal limits.   3.4 cm and 1.9 cm heterogeneous enhancing masses within the anterior soft tissues of neck unchanged compared to  2/2/2018. They may represent ectopic thyroid tissue.      CT-CTA HEAD WITH & W/O-POST PROCESS   Final Result      No evidence of intracranial vascular occlusion or aneurysm.      CT-HEAD W/O   Final Result      1.  No evidence of acute intracranial process.      2.  Stable prominent bilateral periventricular white matter low-density consistent with gliosis.            LABORATORY  Lab Results   Component Value Date/Time    SODIUM 138 05/13/2018 04:32 AM    POTASSIUM 3.9 05/13/2018 04:32 AM    CHLORIDE 104 05/13/2018 04:32 AM    CO2 23 05/13/2018 04:32 AM    GLUCOSE 121 (H) 05/13/2018 04:32 AM    BUN 18 05/13/2018 04:32 AM    CREATININE 0.79 05/13/2018 04:32 AM        Lab Results   Component Value Date/Time    WBC 8.9 05/13/2018 04:32 AM    HEMOGLOBIN 13.8 (L) 05/13/2018 04:32 AM    HEMATOCRIT 41.0 (L) 05/13/2018 04:32 AM    PLATELETCT 226 05/13/2018 04:32 AM        Total time of the discharge process exceeds 45 minutes.

## 2018-05-16 NOTE — PROGRESS NOTES
Pt given discharge instructions with education regarding med summary and follow-up visits. Pt voices understanding and signs papers. [facility] tech at bedside. Pt leaves via WC with ME tech to go to [facility]. Pt is stable and has all personal belongings.

## 2018-05-16 NOTE — CARE PLAN
Problem: Communication  Goal: The ability to communicate needs accurately and effectively will improve  Outcome: PROGRESSING AS EXPECTED      Problem: Safety  Goal: Will remain free from falls  Outcome: PROGRESSING AS EXPECTED      Problem: Skin Integrity  Goal: Risk for impaired skin integrity will decrease  Outcome: PROGRESSING AS EXPECTED

## 2018-05-16 NOTE — THERAPY
"Speech Language Therapy dysphagia treatment completed.   Functional Status:  fxnl swallow for diet upgrade although pt still complains of reduced chew/trismus.  Ok for dry med pass with thin liquid wash.  R weak, updated strategies reposted in pt's room.  Recommendations: Upgraded diet to D3/thin liquids with assisted tray set up and monitor during meal due to R weakness.  Meds whole ok   Plan of Care: Will benefit from Speech Therapy 3 times per week  Post-Acute Therapy: Discharge to a transitional care facility for continued skilled therapy services; defer to PT/OT re physical mobility.    See \"Rehab Therapy-Acute\" Patient Summary Report for complete documentation.     "

## 2018-05-16 NOTE — CARE PLAN
Problem: Safety  Goal: Will remain free from injury  Outcome: PROGRESSING AS EXPECTED      Problem: Mobility  Goal: Risk for activity intolerance will decrease  Outcome: PROGRESSING AS EXPECTED      Problem: Pain Management  Goal: Pain level will decrease to patient's comfort goal  Outcome: PROGRESSING AS EXPECTED

## 2018-05-16 NOTE — DISCHARGE PLANNING
[facility] has received auth. Arranged patient's transport to [facility] at 1300 via [employer]. Angelica) notified of transport time.

## 2018-05-16 NOTE — PROGRESS NOTES
Pt A+Ox4. Flat affect and withdrawn. c/o HA pain, tylenol given with + results. c/o n/t in right extremities. 1/5 strength in right extremities, very poor  strength, withdrawals from pain. Pt has been hypotensive and bradycardic, tele monitor is in place. All questions answered regarding POC.

## 2018-05-17 LAB
JC VIRUS SOURCE  Q4278: NORMAL
JCPYV DNA SERPL QL NAA+PROBE: NOT DETECTED

## 2018-06-06 ENCOUNTER — HOME HEALTH ADMISSION (OUTPATIENT)
Dept: HOME HEALTH SERVICES | Facility: HOME HEALTHCARE | Age: 38
End: 2018-06-06
Payer: MEDICAID

## 2018-07-18 PROCEDURE — 99285 EMERGENCY DEPT VISIT HI MDM: CPT

## 2018-07-18 ASSESSMENT — PAIN SCALES - GENERAL: PAINLEVEL_OUTOF10: 7

## 2018-07-19 ENCOUNTER — HOSPITAL ENCOUNTER (EMERGENCY)
Facility: MEDICAL CENTER | Age: 38
End: 2018-07-19
Attending: EMERGENCY MEDICINE
Payer: MEDICAID

## 2018-07-19 VITALS
DIASTOLIC BLOOD PRESSURE: 71 MMHG | HEART RATE: 66 BPM | WEIGHT: 298.28 LBS | RESPIRATION RATE: 17 BRPM | TEMPERATURE: 98.4 F | BODY MASS INDEX: 34.51 KG/M2 | SYSTOLIC BLOOD PRESSURE: 136 MMHG | OXYGEN SATURATION: 96 % | HEIGHT: 78 IN

## 2018-07-19 DIAGNOSIS — R19.7 VOMITING AND DIARRHEA: ICD-10-CM

## 2018-07-19 DIAGNOSIS — R11.10 VOMITING AND DIARRHEA: ICD-10-CM

## 2018-07-19 LAB
ALBUMIN SERPL BCP-MCNC: 4.6 G/DL (ref 3.2–4.9)
ALBUMIN/GLOB SERPL: 1.8 G/DL
ALP SERPL-CCNC: 48 U/L (ref 30–99)
ALT SERPL-CCNC: 31 U/L (ref 2–50)
ANION GAP SERPL CALC-SCNC: 11 MMOL/L (ref 0–11.9)
AST SERPL-CCNC: 35 U/L (ref 12–45)
BASOPHILS # BLD AUTO: 0.9 % (ref 0–1.8)
BASOPHILS # BLD: 0.09 K/UL (ref 0–0.12)
BILIRUB SERPL-MCNC: 0.6 MG/DL (ref 0.1–1.5)
BUN SERPL-MCNC: 17 MG/DL (ref 8–22)
CALCIUM SERPL-MCNC: 9.9 MG/DL (ref 8.5–10.5)
CHLORIDE SERPL-SCNC: 100 MMOL/L (ref 96–112)
CO2 SERPL-SCNC: 27 MMOL/L (ref 20–33)
CREAT SERPL-MCNC: 0.93 MG/DL (ref 0.5–1.4)
EOSINOPHIL # BLD AUTO: 0.2 K/UL (ref 0–0.51)
EOSINOPHIL NFR BLD: 2.1 % (ref 0–6.9)
ERYTHROCYTE [DISTWIDTH] IN BLOOD BY AUTOMATED COUNT: 44.4 FL (ref 35.9–50)
GLOBULIN SER CALC-MCNC: 2.5 G/DL (ref 1.9–3.5)
GLUCOSE SERPL-MCNC: 150 MG/DL (ref 65–99)
HCT VFR BLD AUTO: 40.5 % (ref 42–52)
HGB BLD-MCNC: 13.8 G/DL (ref 14–18)
IMM GRANULOCYTES # BLD AUTO: 0.1 K/UL (ref 0–0.11)
IMM GRANULOCYTES NFR BLD AUTO: 1.1 % (ref 0–0.9)
LIPASE SERPL-CCNC: 9 U/L (ref 11–82)
LYMPHOCYTES # BLD AUTO: 3.69 K/UL (ref 1–4.8)
LYMPHOCYTES NFR BLD: 38.9 % (ref 22–41)
MCH RBC QN AUTO: 31.7 PG (ref 27–33)
MCHC RBC AUTO-ENTMCNC: 34.1 G/DL (ref 33.7–35.3)
MCV RBC AUTO: 92.9 FL (ref 81.4–97.8)
MONOCYTES # BLD AUTO: 1.08 K/UL (ref 0–0.85)
MONOCYTES NFR BLD AUTO: 11.4 % (ref 0–13.4)
NEUTROPHILS # BLD AUTO: 4.33 K/UL (ref 1.82–7.42)
NEUTROPHILS NFR BLD: 45.6 % (ref 44–72)
NRBC # BLD AUTO: 0 K/UL
NRBC BLD-RTO: 0 /100 WBC
PLATELET # BLD AUTO: 259 K/UL (ref 164–446)
PMV BLD AUTO: 9.8 FL (ref 9–12.9)
POTASSIUM SERPL-SCNC: 3.8 MMOL/L (ref 3.6–5.5)
PROT SERPL-MCNC: 7.1 G/DL (ref 6–8.2)
RBC # BLD AUTO: 4.36 M/UL (ref 4.7–6.1)
SODIUM SERPL-SCNC: 138 MMOL/L (ref 135–145)
WBC # BLD AUTO: 9.5 K/UL (ref 4.8–10.8)

## 2018-07-19 PROCEDURE — 96374 THER/PROPH/DIAG INJ IV PUSH: CPT

## 2018-07-19 PROCEDURE — 80053 COMPREHEN METABOLIC PANEL: CPT

## 2018-07-19 PROCEDURE — 85025 COMPLETE CBC W/AUTO DIFF WBC: CPT

## 2018-07-19 PROCEDURE — 700111 HCHG RX REV CODE 636 W/ 250 OVERRIDE (IP): Performed by: EMERGENCY MEDICINE

## 2018-07-19 PROCEDURE — 96361 HYDRATE IV INFUSION ADD-ON: CPT

## 2018-07-19 PROCEDURE — 83690 ASSAY OF LIPASE: CPT

## 2018-07-19 PROCEDURE — 700105 HCHG RX REV CODE 258: Performed by: EMERGENCY MEDICINE

## 2018-07-19 RX ORDER — ONDANSETRON 2 MG/ML
4 INJECTION INTRAMUSCULAR; INTRAVENOUS ONCE
Status: COMPLETED | OUTPATIENT
Start: 2018-07-19 | End: 2018-07-19

## 2018-07-19 RX ORDER — SODIUM CHLORIDE 9 MG/ML
1000 INJECTION, SOLUTION INTRAVENOUS ONCE
Status: COMPLETED | OUTPATIENT
Start: 2018-07-19 | End: 2018-07-19

## 2018-07-19 RX ORDER — ONDANSETRON 4 MG/1
4 TABLET, ORALLY DISINTEGRATING ORAL EVERY 8 HOURS PRN
Qty: 10 TAB | Refills: 0 | Status: SHIPPED | OUTPATIENT
Start: 2018-07-19 | End: 2018-07-27

## 2018-07-19 RX ADMIN — SODIUM CHLORIDE 1000 ML: 9 INJECTION, SOLUTION INTRAVENOUS at 04:24

## 2018-07-19 RX ADMIN — ONDANSETRON 4 MG: 2 INJECTION INTRAMUSCULAR; INTRAVENOUS at 04:24

## 2018-07-19 ASSESSMENT — PAIN SCALES - GENERAL: PAINLEVEL_OUTOF10: 7

## 2018-07-19 NOTE — ED NOTES
All lines and monitors discontinued. Discharge instructions given, questions answered.    Norm out of ER, escorted by self.  Rx given.

## 2018-07-19 NOTE — ED TRIAGE NOTES
Norm Prabhakar  38 y.o.  male  Chief Complaint   Patient presents with   • Abdominal Pain   • N/V     Present to triage c/o abdominal pain ( cramping ) as described and constant. + N/V/D x 3 days. Unable to hold food and fluids.

## 2018-07-19 NOTE — ED NOTES
[place] 1 Fall Risk Assessment Tool     Present to ED because of fall  No  (Syncope, seizure, or ALOC)  Age>70   No  Altered Mental Status:  (Intoxicated with Alcohol or Substance Confusion,  Inability to follow instructions, disorientation) No  Impaired Mobility:  Ambulates or transfers with assistive devices or assist  Ambulates with unsteady gait and no assistance  Unable to ambulate or transfer   No  Nursing Judgement  (Bowel or bladder incontinence, diarrhea, urinary   frequency or urgency, leg weakness, orthostatic   hypotension, dizziness or vertigo, narcotic use).   No   Fall Risk Interventions     Move the patient closer to the nurse's station  Familiarize the patient with environment.  Place call light within reach and demonstrate call light use.  Place stretcher in low position and brakes locked  Keep floor surfaces clean and dry.  Keep patient care areas uncluttered.  Assess patient hourly for: Pain, Personal Needs, Position change, and call   light access

## 2018-07-19 NOTE — ED NOTES
Pt called up to retake vitals. Pt's pain level is at 7/10 and no other changes. Pt has no new concerns or questions at this time.

## 2018-07-19 NOTE — ED PROVIDER NOTES
"ED Provider Note    CHIEF COMPLAINT  Chief Complaint   Patient presents with   • Abdominal Pain   • N/V       HPI  Norm Prabhakar is a 38 y.o. male who presents to the emergency department with nausea, vomiting and diarrhea.  Symptoms started about 3 days ago.  His mom was sick with a recent illness as well very similar to this.  Multiple episodes of nonbloody nonbilious emesis.  Nonbloody watery stool.  No fever.  Diffuse abdominal cramping usually associated with vomiting or diarrhea.  No localized pain.  Minimal symptoms now aside from nausea.  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   • Hypothyroidism    • Indigestion     once in a while   • Mental disorder     learning disabilities; speech impairment; developmental delays   • Murmur     since birth   • Pneumonia     remote   • Psychiatric problem 2002    PTSD   • S/P thyroidectomy    • Seizure (HCC) 2010   • Seizure disorder (HCC)    • Unspecified disorder of thyroid        SOCIAL HISTORY  Social History   Substance Use Topics   • Smoking status: Former Smoker     Packs/day: 0.25     Years: 4.00     Types: Cigarettes, Cigars     Quit date: 1/1/2014   • Smokeless tobacco: Never Used   • Alcohol use No       SURGICAL HISTORY  Past Surgical History:   Procedure Laterality Date   • EYE SURGERY     • OTHER      Hernia Repair when he was 10 yrs old   • THYROID LOBECTOMY         CURRENT MEDICATIONS  Home Medications    **Home medications have not yet been reviewed for this encounter**         ALLERGIES  Allergies   Allergen Reactions   • Abilify Unspecified     \"Feeling tired, like I don't even know whats going " "on around me\"   • Fish      Pt reports fish causes him to be sick to his stomach         PHYSICAL EXAM  VITAL SIGNS: /71   Pulse 66   Temp 36.9 °C (98.4 °F)   Resp 17   Ht 1.981 m (6' 6\") Comment: Simultaneous filing. User may not have seen previous data.  Wt (!) 135.3 kg (298 lb 4.5 oz)   SpO2 96%   BMI 34.47 kg/m²    Constitutional: Awake and alert  HENT:  Atraumatic, Normocephalic.Oropharynx dry mucus membranes, Nose normal inspection.   Eyes: He is blind in the left eye.  Pupils reactive  Neck: Supple  Cardiovascular: Normal heart rate, Normal rhythm.  Symmetric peripheral pulses.   Thorax & Lungs: No respiratory distress, No wheezing, No rales, No rhonchi, No chest tenderness.   Abdomen: Bowel sounds normal, soft, minimal diffuse tenderness.  No rebound or peritonitis.  No tenderness in the right lower abdomen.  Skin: Warm, Dry, No rash.   Back: No tenderness, No CVA tenderness.   Extremities: No clubbing, cyanosis, edema, no Homans or cords   Neurologic: Awake alert and oriented.  Questionable mild left-sided facial drooping versus residual from previous ocular surgery.      Labs:  Results for orders placed or performed during the hospital encounter of 07/19/18   CBC WITH DIFFERENTIAL   Result Value Ref Range    WBC 9.5 4.8 - 10.8 K/uL    RBC 4.36 (L) 4.70 - 6.10 M/uL    Hemoglobin 13.8 (L) 14.0 - 18.0 g/dL    Hematocrit 40.5 (L) 42.0 - 52.0 %    MCV 92.9 81.4 - 97.8 fL    MCH 31.7 27.0 - 33.0 pg    MCHC 34.1 33.7 - 35.3 g/dL    RDW 44.4 35.9 - 50.0 fL    Platelet Count 259 164 - 446 K/uL    MPV 9.8 9.0 - 12.9 fL    Neutrophils-Polys 45.60 44.00 - 72.00 %    Lymphocytes 38.90 22.00 - 41.00 %    Monocytes 11.40 0.00 - 13.40 %    Eosinophils 2.10 0.00 - 6.90 %    Basophils 0.90 0.00 - 1.80 %    Immature Granulocytes 1.10 (H) 0.00 - 0.90 %    Nucleated RBC 0.00 /100 WBC    Neutrophils (Absolute) 4.33 1.82 - 7.42 K/uL    Lymphs (Absolute) 3.69 1.00 - 4.80 K/uL    Monos (Absolute) 1.08 (H) 0.00 - 0.85 " K/uL    Eos (Absolute) 0.20 0.00 - 0.51 K/uL    Baso (Absolute) 0.09 0.00 - 0.12 K/uL    Immature Granulocytes (abs) 0.10 0.00 - 0.11 K/uL    NRBC (Absolute) 0.00 K/uL   COMP METABOLIC PANEL   Result Value Ref Range    Sodium 138 135 - 145 mmol/L    Potassium 3.8 3.6 - 5.5 mmol/L    Chloride 100 96 - 112 mmol/L    Co2 27 20 - 33 mmol/L    Anion Gap 11.0 0.0 - 11.9    Glucose 150 (H) 65 - 99 mg/dL    Bun 17 8 - 22 mg/dL    Creatinine 0.93 0.50 - 1.40 mg/dL    Calcium 9.9 8.5 - 10.5 mg/dL    AST(SGOT) 35 12 - 45 U/L    ALT(SGPT) 31 2 - 50 U/L    Alkaline Phosphatase 48 30 - 99 U/L    Total Bilirubin 0.6 0.1 - 1.5 mg/dL    Albumin 4.6 3.2 - 4.9 g/dL    Total Protein 7.1 6.0 - 8.2 g/dL    Globulin 2.5 1.9 - 3.5 g/dL    A-G Ratio 1.8 g/dL   LIPASE   Result Value Ref Range    Lipase 9 (L) 11 - 82 U/L   ESTIMATED GFR   Result Value Ref Range    GFR If African American >60 >60 mL/min/1.73 m 2    GFR If Non African American >60 >60 mL/min/1.73 m 2        Medications   NS infusion 1,000 mL (1,000 mL Intravenous New Bag 7/19/18 0424)   ondansetron (ZOFRAN) syringe/vial injection 4 mg (4 mg Intravenous Given 7/19/18 0424)       Patient was given IV fluids because of clinical dehydration with dry mucous membranes. The patient was  improved following treatment.    COURSE & MEDICAL DECISION MAKING  Patient presents with nausea, vomiting and diarrhea.  He had a nonsurgical abdominal exam.  He did look slightly dehydrated with dry mucous membranes.  He was not tachycardic.  He was given IV fluids and Zofran intravenously.  His symptoms resolved.  Repeat abdominal exam displayed no tenderness.  Denied any abdominal pain.  Said he was feeling much better.  Laboratory data returned as noted.  He is appropriate for outpatient management.  I have given a prescription for Zofran.  Advised plenty of fluids and rest.  I suspect most likely this is a viral illness.  I have advised the patient that if he has pain, fevers or concern he needs  to return to the ER for repeat assessment.    Patient referred to primary provider for blood pressure management    FINAL IMPRESSION  1.  Vomiting and diarrhea, suspected viral illness      This dictation was created using voice recognition software. The accuracy of the dictation is limited to the abilities of the software.  The nursing notes were reviewed and certain aspects of this information were incorporated into this note.      Electronically signed by: Amauri Godinez, 7/19/2018 5:40 AM

## 2018-07-20 ENCOUNTER — PATIENT OUTREACH (OUTPATIENT)
Dept: HEALTH INFORMATION MANAGEMENT | Facility: OTHER | Age: 38
End: 2018-07-20

## 2018-07-27 ENCOUNTER — HOSPITAL ENCOUNTER (OUTPATIENT)
Facility: MEDICAL CENTER | Age: 38
End: 2018-07-29
Attending: EMERGENCY MEDICINE | Admitting: INTERNAL MEDICINE
Payer: MEDICAID

## 2018-07-27 ENCOUNTER — APPOINTMENT (OUTPATIENT)
Dept: RADIOLOGY | Facility: MEDICAL CENTER | Age: 38
End: 2018-07-27
Attending: EMERGENCY MEDICINE
Payer: MEDICAID

## 2018-07-27 DIAGNOSIS — R10.31 RLQ ABDOMINAL PAIN: ICD-10-CM

## 2018-07-27 DIAGNOSIS — K92.1 HEMATOCHEZIA: ICD-10-CM

## 2018-07-27 LAB
ALBUMIN SERPL BCP-MCNC: 4.5 G/DL (ref 3.2–4.9)
ALBUMIN/GLOB SERPL: 2.3 G/DL
ALP SERPL-CCNC: 47 U/L (ref 30–99)
ALT SERPL-CCNC: 25 U/L (ref 2–50)
ANION GAP SERPL CALC-SCNC: 10 MMOL/L (ref 0–11.9)
APPEARANCE UR: CLEAR
APTT PPP: 29 SEC (ref 24.7–36)
AST SERPL-CCNC: 32 U/L (ref 12–45)
BASOPHILS # BLD AUTO: 0.7 % (ref 0–1.8)
BASOPHILS # BLD: 0.06 K/UL (ref 0–0.12)
BILIRUB SERPL-MCNC: 0.8 MG/DL (ref 0.1–1.5)
BILIRUB UR QL STRIP.AUTO: NEGATIVE
BUN SERPL-MCNC: 25 MG/DL (ref 8–22)
CALCIUM SERPL-MCNC: 8.8 MG/DL (ref 8.5–10.5)
CHLORIDE SERPL-SCNC: 102 MMOL/L (ref 96–112)
CO2 SERPL-SCNC: 27 MMOL/L (ref 20–33)
COLOR UR: YELLOW
CREAT SERPL-MCNC: 1.16 MG/DL (ref 0.5–1.4)
EOSINOPHIL # BLD AUTO: 0.13 K/UL (ref 0–0.51)
EOSINOPHIL NFR BLD: 1.5 % (ref 0–6.9)
ERYTHROCYTE [DISTWIDTH] IN BLOOD BY AUTOMATED COUNT: 43.9 FL (ref 35.9–50)
GLOBULIN SER CALC-MCNC: 2 G/DL (ref 1.9–3.5)
GLUCOSE SERPL-MCNC: 93 MG/DL (ref 65–99)
GLUCOSE UR STRIP.AUTO-MCNC: NEGATIVE MG/DL
HCT VFR BLD AUTO: 40.2 % (ref 42–52)
HGB BLD-MCNC: 13.7 G/DL (ref 14–18)
IMM GRANULOCYTES # BLD AUTO: 0.08 K/UL (ref 0–0.11)
IMM GRANULOCYTES NFR BLD AUTO: 0.9 % (ref 0–0.9)
INR PPP: 1.09 (ref 0.87–1.13)
KETONES UR STRIP.AUTO-MCNC: 15 MG/DL
LACTATE BLD-SCNC: 1.7 MMOL/L (ref 0.5–2)
LEUKOCYTE ESTERASE UR QL STRIP.AUTO: NEGATIVE
LIPASE SERPL-CCNC: 10 U/L (ref 11–82)
LYMPHOCYTES # BLD AUTO: 2.62 K/UL (ref 1–4.8)
LYMPHOCYTES NFR BLD: 30.8 % (ref 22–41)
MCH RBC QN AUTO: 31.9 PG (ref 27–33)
MCHC RBC AUTO-ENTMCNC: 34.1 G/DL (ref 33.7–35.3)
MCV RBC AUTO: 93.5 FL (ref 81.4–97.8)
MICRO URNS: ABNORMAL
MONOCYTES # BLD AUTO: 0.85 K/UL (ref 0–0.85)
MONOCYTES NFR BLD AUTO: 10 % (ref 0–13.4)
NEUTROPHILS # BLD AUTO: 4.77 K/UL (ref 1.82–7.42)
NEUTROPHILS NFR BLD: 56.1 % (ref 44–72)
NITRITE UR QL STRIP.AUTO: NEGATIVE
NRBC # BLD AUTO: 0 K/UL
NRBC BLD-RTO: 0 /100 WBC
PH UR STRIP.AUTO: 5 [PH]
PLATELET # BLD AUTO: 260 K/UL (ref 164–446)
PMV BLD AUTO: 9.9 FL (ref 9–12.9)
POTASSIUM SERPL-SCNC: 4.2 MMOL/L (ref 3.6–5.5)
PROT SERPL-MCNC: 6.5 G/DL (ref 6–8.2)
PROT UR QL STRIP: NEGATIVE MG/DL
PROTHROMBIN TIME: 13.8 SEC (ref 12–14.6)
RBC # BLD AUTO: 4.3 M/UL (ref 4.7–6.1)
RBC UR QL AUTO: NEGATIVE
SODIUM SERPL-SCNC: 139 MMOL/L (ref 135–145)
SP GR UR STRIP.AUTO: 1.03
TROPONIN I SERPL-MCNC: <0.01 NG/ML (ref 0–0.04)
UROBILINOGEN UR STRIP.AUTO-MCNC: 1 MG/DL
WBC # BLD AUTO: 8.5 K/UL (ref 4.8–10.8)

## 2018-07-27 PROCEDURE — 93005 ELECTROCARDIOGRAM TRACING: CPT | Performed by: INTERNAL MEDICINE

## 2018-07-27 PROCEDURE — 74177 CT ABD & PELVIS W/CONTRAST: CPT

## 2018-07-27 PROCEDURE — 99223 1ST HOSP IP/OBS HIGH 75: CPT | Performed by: INTERNAL MEDICINE

## 2018-07-27 PROCEDURE — 94760 N-INVAS EAR/PLS OXIMETRY 1: CPT

## 2018-07-27 PROCEDURE — A9270 NON-COVERED ITEM OR SERVICE: HCPCS | Performed by: INTERNAL MEDICINE

## 2018-07-27 PROCEDURE — 81003 URINALYSIS AUTO W/O SCOPE: CPT

## 2018-07-27 PROCEDURE — 83690 ASSAY OF LIPASE: CPT

## 2018-07-27 PROCEDURE — 80053 COMPREHEN METABOLIC PANEL: CPT

## 2018-07-27 PROCEDURE — 99285 EMERGENCY DEPT VISIT HI MDM: CPT

## 2018-07-27 PROCEDURE — 85610 PROTHROMBIN TIME: CPT

## 2018-07-27 PROCEDURE — 96360 HYDRATION IV INFUSION INIT: CPT

## 2018-07-27 PROCEDURE — 700105 HCHG RX REV CODE 258: Performed by: INTERNAL MEDICINE

## 2018-07-27 PROCEDURE — 85025 COMPLETE CBC W/AUTO DIFF WBC: CPT

## 2018-07-27 PROCEDURE — G0378 HOSPITAL OBSERVATION PER HR: HCPCS

## 2018-07-27 PROCEDURE — 83605 ASSAY OF LACTIC ACID: CPT

## 2018-07-27 PROCEDURE — 85730 THROMBOPLASTIN TIME PARTIAL: CPT

## 2018-07-27 PROCEDURE — 700102 HCHG RX REV CODE 250 W/ 637 OVERRIDE(OP): Performed by: INTERNAL MEDICINE

## 2018-07-27 PROCEDURE — 700117 HCHG RX CONTRAST REV CODE 255: Performed by: EMERGENCY MEDICINE

## 2018-07-27 PROCEDURE — 36415 COLL VENOUS BLD VENIPUNCTURE: CPT

## 2018-07-27 PROCEDURE — 84484 ASSAY OF TROPONIN QUANT: CPT

## 2018-07-27 PROCEDURE — 93010 ELECTROCARDIOGRAM REPORT: CPT | Performed by: INTERNAL MEDICINE

## 2018-07-27 PROCEDURE — 82272 OCCULT BLD FECES 1-3 TESTS: CPT

## 2018-07-27 PROCEDURE — 700105 HCHG RX REV CODE 258: Performed by: EMERGENCY MEDICINE

## 2018-07-27 RX ORDER — OXYCODONE HYDROCHLORIDE 5 MG/1
2.5 TABLET ORAL
Status: DISCONTINUED | OUTPATIENT
Start: 2018-07-27 | End: 2018-07-29 | Stop reason: HOSPADM

## 2018-07-27 RX ORDER — MIRTAZAPINE 15 MG/1
15 TABLET, FILM COATED ORAL NIGHTLY
Status: DISCONTINUED | OUTPATIENT
Start: 2018-07-27 | End: 2018-07-29 | Stop reason: HOSPADM

## 2018-07-27 RX ORDER — ONDANSETRON 4 MG/1
4 TABLET, ORALLY DISINTEGRATING ORAL EVERY 4 HOURS PRN
Status: DISCONTINUED | OUTPATIENT
Start: 2018-07-27 | End: 2018-07-29 | Stop reason: HOSPADM

## 2018-07-27 RX ORDER — LABETALOL HYDROCHLORIDE 5 MG/ML
10 INJECTION, SOLUTION INTRAVENOUS EVERY 4 HOURS PRN
Status: DISCONTINUED | OUTPATIENT
Start: 2018-07-27 | End: 2018-07-29 | Stop reason: HOSPADM

## 2018-07-27 RX ORDER — OXYCODONE HYDROCHLORIDE 5 MG/1
5 TABLET ORAL
Status: DISCONTINUED | OUTPATIENT
Start: 2018-07-27 | End: 2018-07-29 | Stop reason: HOSPADM

## 2018-07-27 RX ORDER — HYDROXYZINE PAMOATE 25 MG/1
25 CAPSULE ORAL 3 TIMES DAILY PRN
COMMUNITY
End: 2018-12-31

## 2018-07-27 RX ORDER — GLIMEPIRIDE 4 MG/1
4 TABLET ORAL EVERY MORNING
COMMUNITY
End: 2019-07-21

## 2018-07-27 RX ORDER — ALBUTEROL SULFATE 90 UG/1
2 AEROSOL, METERED RESPIRATORY (INHALATION) EVERY 4 HOURS PRN
Status: DISCONTINUED | OUTPATIENT
Start: 2018-07-27 | End: 2018-07-29 | Stop reason: HOSPADM

## 2018-07-27 RX ORDER — LEVETIRACETAM 500 MG/1
500 TABLET ORAL 2 TIMES DAILY
Status: DISCONTINUED | OUTPATIENT
Start: 2018-07-27 | End: 2018-07-29 | Stop reason: HOSPADM

## 2018-07-27 RX ORDER — HYDROXYZINE PAMOATE 25 MG/1
25 CAPSULE ORAL 3 TIMES DAILY PRN
Status: DISCONTINUED | OUTPATIENT
Start: 2018-07-27 | End: 2018-07-29 | Stop reason: HOSPADM

## 2018-07-27 RX ORDER — DIVALPROEX SODIUM 500 MG/1
1500 TABLET, DELAYED RELEASE ORAL EVERY EVENING
Status: DISCONTINUED | OUTPATIENT
Start: 2018-07-27 | End: 2018-07-29 | Stop reason: HOSPADM

## 2018-07-27 RX ORDER — SODIUM CHLORIDE 9 MG/ML
1000 INJECTION, SOLUTION INTRAVENOUS ONCE
Status: COMPLETED | OUTPATIENT
Start: 2018-07-27 | End: 2018-07-27

## 2018-07-27 RX ORDER — DIVALPROEX SODIUM 500 MG/1
500 TABLET, DELAYED RELEASE ORAL EVERY MORNING
Status: DISCONTINUED | OUTPATIENT
Start: 2018-07-28 | End: 2018-07-29 | Stop reason: HOSPADM

## 2018-07-27 RX ORDER — ONDANSETRON 2 MG/ML
4 INJECTION INTRAMUSCULAR; INTRAVENOUS EVERY 4 HOURS PRN
Status: DISCONTINUED | OUTPATIENT
Start: 2018-07-27 | End: 2018-07-29 | Stop reason: HOSPADM

## 2018-07-27 RX ORDER — BUSPIRONE HYDROCHLORIDE 10 MG/1
10 TABLET ORAL 3 TIMES DAILY
Status: DISCONTINUED | OUTPATIENT
Start: 2018-07-27 | End: 2018-07-29 | Stop reason: HOSPADM

## 2018-07-27 RX ORDER — SODIUM CHLORIDE, SODIUM LACTATE, POTASSIUM CHLORIDE, CALCIUM CHLORIDE 600; 310; 30; 20 MG/100ML; MG/100ML; MG/100ML; MG/100ML
INJECTION, SOLUTION INTRAVENOUS CONTINUOUS
Status: DISCONTINUED | OUTPATIENT
Start: 2018-07-27 | End: 2018-07-28

## 2018-07-27 RX ORDER — ACETAMINOPHEN 325 MG/1
650 TABLET ORAL EVERY 6 HOURS PRN
Status: DISCONTINUED | OUTPATIENT
Start: 2018-07-27 | End: 2018-07-29 | Stop reason: HOSPADM

## 2018-07-27 RX ORDER — PRAZOSIN HYDROCHLORIDE 1 MG/1
1 CAPSULE ORAL NIGHTLY
Status: DISCONTINUED | OUTPATIENT
Start: 2018-07-27 | End: 2018-07-29 | Stop reason: HOSPADM

## 2018-07-27 RX ORDER — BUSPIRONE HYDROCHLORIDE 10 MG/1
10 TABLET ORAL 3 TIMES DAILY
COMMUNITY
End: 2018-12-31

## 2018-07-27 RX ORDER — LEVOTHYROXINE SODIUM 0.12 MG/1
125 TABLET ORAL
Status: DISCONTINUED | OUTPATIENT
Start: 2018-07-28 | End: 2018-07-29 | Stop reason: HOSPADM

## 2018-07-27 RX ORDER — PRAZOSIN HYDROCHLORIDE 1 MG/1
1 CAPSULE ORAL NIGHTLY
Status: ON HOLD | COMMUNITY
End: 2019-01-10

## 2018-07-27 RX ORDER — ATORVASTATIN CALCIUM 80 MG/1
80 TABLET, FILM COATED ORAL NIGHTLY
Status: ON HOLD | COMMUNITY
End: 2019-01-10

## 2018-07-27 RX ORDER — ATORVASTATIN CALCIUM 20 MG/1
80 TABLET, FILM COATED ORAL NIGHTLY
Status: DISCONTINUED | OUTPATIENT
Start: 2018-07-27 | End: 2018-07-29 | Stop reason: HOSPADM

## 2018-07-27 RX ORDER — MIRTAZAPINE 30 MG/1
15-30 TABLET, FILM COATED ORAL NIGHTLY
COMMUNITY
End: 2018-12-31

## 2018-07-27 RX ORDER — MORPHINE SULFATE 4 MG/ML
2 INJECTION, SOLUTION INTRAMUSCULAR; INTRAVENOUS
Status: DISCONTINUED | OUTPATIENT
Start: 2018-07-27 | End: 2018-07-29 | Stop reason: HOSPADM

## 2018-07-27 RX ORDER — DIVALPROEX SODIUM 500 MG/1
500-1500 TABLET, DELAYED RELEASE ORAL 2 TIMES DAILY
Status: DISCONTINUED | OUTPATIENT
Start: 2018-07-27 | End: 2018-07-27

## 2018-07-27 RX ADMIN — VITAMIN D, TAB 1000IU (100/BT) 4000 UNITS: 25 TAB at 22:19

## 2018-07-27 RX ADMIN — BUSPIRONE HYDROCHLORIDE 10 MG: 10 TABLET ORAL at 22:18

## 2018-07-27 RX ADMIN — LEVETIRACETAM 500 MG: 500 TABLET ORAL at 22:19

## 2018-07-27 RX ADMIN — ATORVASTATIN CALCIUM 80 MG: 20 TABLET, FILM COATED ORAL at 22:17

## 2018-07-27 RX ADMIN — SODIUM CHLORIDE 1000 ML: 9 INJECTION, SOLUTION INTRAVENOUS at 16:00

## 2018-07-27 RX ADMIN — IOHEXOL 100 ML: 350 INJECTION, SOLUTION INTRAVENOUS at 14:52

## 2018-07-27 RX ADMIN — HYDROXYZINE PAMOATE 25 MG: 25 CAPSULE ORAL at 22:21

## 2018-07-27 RX ADMIN — SODIUM CHLORIDE, POTASSIUM CHLORIDE, SODIUM LACTATE AND CALCIUM CHLORIDE: 600; 310; 30; 20 INJECTION, SOLUTION INTRAVENOUS at 22:12

## 2018-07-27 RX ADMIN — OXYCODONE HYDROCHLORIDE 5 MG: 5 TABLET ORAL at 22:18

## 2018-07-27 RX ADMIN — DIVALPROEX SODIUM 1500 MG: 500 TABLET, DELAYED RELEASE ORAL at 22:18

## 2018-07-27 RX ADMIN — OXYCODONE HYDROCHLORIDE 5 MG: 5 TABLET ORAL at 18:20

## 2018-07-27 RX ADMIN — PRAZOSIN HYDROCHLORIDE 1 MG: 1 CAPSULE ORAL at 22:19

## 2018-07-27 RX ADMIN — MIRTAZAPINE 15 MG: 15 TABLET, FILM COATED ORAL at 22:19

## 2018-07-27 ASSESSMENT — COPD QUESTIONNAIRES
HAVE YOU SMOKED AT LEAST 100 CIGARETTES IN YOUR ENTIRE LIFE: NO/DON'T KNOW
DO YOU EVER COUGH UP ANY MUCUS OR PHLEGM?: NO/ONLY WITH OCCASIONAL COLDS OR INFECTIONS
DURING THE PAST 4 WEEKS HOW MUCH DID YOU FEEL SHORT OF BREATH: NONE/LITTLE OF THE TIME
IN THE PAST 12 MONTHS DO YOU DO LESS THAN YOU USED TO BECAUSE OF YOUR BREATHING PROBLEMS: DISAGREE/UNSURE
DO YOU EVER COUGH UP ANY MUCUS OR PHLEGM?: NO/ONLY WITH OCCASIONAL COLDS OR INFECTIONS
COPD SCREENING SCORE: 2
DURING THE PAST 4 WEEKS HOW MUCH DID YOU FEEL SHORT OF BREATH: NONE/LITTLE OF THE TIME
HAVE YOU SMOKED AT LEAST 100 CIGARETTES IN YOUR ENTIRE LIFE: YES
COPD SCREENING SCORE: 0

## 2018-07-27 ASSESSMENT — COGNITIVE AND FUNCTIONAL STATUS - GENERAL
SUGGESTED CMS G CODE MODIFIER DAILY ACTIVITY: CH
DAILY ACTIVITIY SCORE: 24
SUGGESTED CMS G CODE MODIFIER MOBILITY: CH
MOBILITY SCORE: 24

## 2018-07-27 ASSESSMENT — PATIENT HEALTH QUESTIONNAIRE - PHQ9
6. FEELING BAD ABOUT YOURSELF - OR THAT YOU ARE A FAILURE OR HAVE LET YOURSELF OR YOUR FAMILY DOWN: NOT AL ALL
4. FEELING TIRED OR HAVING LITTLE ENERGY: NOT AT ALL
SUM OF ALL RESPONSES TO PHQ9 QUESTIONS 1 AND 2: 1
2. FEELING DOWN, DEPRESSED, IRRITABLE, OR HOPELESS: SEVERAL DAYS
3. TROUBLE FALLING OR STAYING ASLEEP OR SLEEPING TOO MUCH: NOT AT ALL
SUM OF ALL RESPONSES TO PHQ QUESTIONS 1-9: 2
7. TROUBLE CONCENTRATING ON THINGS, SUCH AS READING THE NEWSPAPER OR WATCHING TELEVISION: SEVERAL DAYS
8. MOVING OR SPEAKING SO SLOWLY THAT OTHER PEOPLE COULD HAVE NOTICED. OR THE OPPOSITE, BEING SO FIGETY OR RESTLESS THAT YOU HAVE BEEN MOVING AROUND A LOT MORE THAN USUAL: NOT AT ALL
1. LITTLE INTEREST OR PLEASURE IN DOING THINGS: NOT AT ALL
5. POOR APPETITE OR OVEREATING: NOT AT ALL
9. THOUGHTS THAT YOU WOULD BE BETTER OFF DEAD, OR OF HURTING YOURSELF: NOT AT ALL

## 2018-07-27 ASSESSMENT — PAIN SCALES - GENERAL
PAINLEVEL_OUTOF10: 7
PAINLEVEL_OUTOF10: 8
PAINLEVEL_OUTOF10: 9
PAINLEVEL_OUTOF10: 8

## 2018-07-27 ASSESSMENT — LIFESTYLE VARIABLES
ALCOHOL_USE: NO
EVER_SMOKED: NEVER
EVER_SMOKED: YES

## 2018-07-27 NOTE — ED PROVIDER NOTES
ED Provider Note    CHIEF COMPLAINT  Chief Complaint   Patient presents with   • RLQ Pain     x 2 days   • Bloody Stools     x 2 days bright red       HPI  Norm Prabhakar is a 38 y.o. male who presents to the emergency department complaining of right lower quadrant pain and bloody stools.  Patient states she has had right lower quadrant pain for about the last 2 days.  It started in the right lower quadrant and remains there.  Does not radiate.  Nothing makes it better, nothing makes it worse.  She has no associated nausea or vomiting but he has had diarrhea.  Diarrhea has been watery and bloody.  States is a lot of blood in the bowl.  Denies any black stools.  No hematemesis.  States she has not had this in the past.  Denies any foreign travel or antibiotic use recently.  Denies a previous colonoscopy or endoscopy.  He was [facility] and discharged home with follow-up.  Comes in because he feels like he is worsening.  Denies any other acute concerns or complaints.    REVIEW OF SYSTEMS  See HPI for further details. All other systems are negative.    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   • Hypothyroidism    • Indigestion     once in a while   • Mental disorder     learning disabilities; speech impairment; developmental delays   • Murmur     since birth   • Pneumonia     remote   • Psychiatric problem 2002    PTSD   • S/P thyroidectomy    • Seizure (HCC) 2010   • Seizure disorder (HCC)    • Unspecified disorder of thyroid        FAMILY HISTORY  Family History   Problem Relation Age of Onset   • Hypertension Mother    • Heart Disease Mother    • Lung Disease Mother    • Stroke Maternal Grandmother        SOCIAL HISTORY  Social History     Social History   • Marital status: Single  "    Spouse name: N/A   • Number of children: N/A   • Years of education: N/A     Social History Main Topics   • Smoking status: Former Smoker     Packs/day: 0.25     Types: Cigarettes, Cigars     Quit date: 5/1/2018   • Smokeless tobacco: Never Used   • Alcohol use No   • Drug use: Yes     Types: Inhaled      Comment: marijuana   • Sexual activity: Not on file     Other Topics Concern   • Not on file     Social History Narrative   • No narrative on file       SURGICAL HISTORY  Past Surgical History:   Procedure Laterality Date   • EYE SURGERY     • OTHER      Hernia Repair when he was 10 yrs old   • THYROID LOBECTOMY         CURRENT MEDICATIONS  Home Medications     Reviewed by Trish Lock R.N. (Registered Nurse) on 07/27/18 at 1247  Med List Status: <None>   Medication Last Dose Status   aspirin (ASA) 325 MG Tab  Active   atorvastatin (LIPITOR) 40 MG Tab 5/10/2018 Active   divalproex (DEPAKOTE) 500 MG Tablet Delayed Response 5/11/2018 Active   glimepiride (AMARYL) 1 MG tablet 5/11/2018 Active   Insulin Glargine (BASAGLAR KWIKPEN) 100 UNIT/ML Solution Pen-injector 5/10/2018 Active   latanoprost (XALATAN) 0.005 % Solution 5/10/2018 Active   levETIRAcetam (KEPPRA) 500 MG Tab 5/11/2018 Active   levothyroxine (SYNTHROID) 125 MCG Tab 5/11/2018 Active   metFORMIN (GLUCOPHAGE) 1000 MG tablet 5/11/2018 Active   ondansetron (ZOFRAN ODT) 4 MG TABLET DISPERSIBLE  Active   sertraline (ZOLOFT) 100 MG Tab 5/11/2018 Active                ALLERGIES  Allergies   Allergen Reactions   • Abilify Unspecified     \"Feeling tired, like I don't even know whats going on around me\"   • Fish      Pt reports fish causes him to be sick to his stomach         PHYSICAL EXAM  VITAL SIGNS: /69   Pulse 73   Temp 36.4 °C (97.5 °F)   Resp 20   Ht 1.981 m (6' 6\")   Wt (!) 137 kg (302 lb 0.5 oz)   SpO2 95%   BMI 34.90 kg/m²      Constitutional: Well developed, Well nourished, No acute distress, Non-toxic appearance.   HENT: " Normocephalic, Atraumatic, Bilateral external ears normal, Oropharynx dry, no oral exudates, Nose normal.   Eyes: PERRL, EOMI, Conjunctiva normal, No discharge.   Neck: Normal range of motion, No tenderness, Supple, No stridor.   Lymphatic: No lymphadenopathy noted.   Cardiovascular: Normal heart rate, Normal rhythm, No murmurs, No rubs, No gallops.   Thorax & Lungs: Normal breath sounds, No respiratory distress, No wheezing,  Abdomen: Bowel sounds normal, Soft, tender in the right lower quadrant no rebound guarding or peritonitis.  Rectal: Brown stool, very minimal stool in the vault.  Trace guaiac positive  Skin: Warm, Dry, No erythema, No rash.   Back: No tenderness, No CVA tenderness.   Musculoskeletal: Good range of motion in all major joints.   Neurologic: Alert & oriented x 3, No focal deficits noted.   Psychiatric: Affect normal,    Results for orders placed or performed during the hospital encounter of 07/27/18   CBC WITH DIFFERENTIAL   Result Value Ref Range    WBC 8.5 4.8 - 10.8 K/uL    RBC 4.30 (L) 4.70 - 6.10 M/uL    Hemoglobin 13.7 (L) 14.0 - 18.0 g/dL    Hematocrit 40.2 (L) 42.0 - 52.0 %    MCV 93.5 81.4 - 97.8 fL    MCH 31.9 27.0 - 33.0 pg    MCHC 34.1 33.7 - 35.3 g/dL    RDW 43.9 35.9 - 50.0 fL    Platelet Count 260 164 - 446 K/uL    MPV 9.9 9.0 - 12.9 fL    Neutrophils-Polys 56.10 44.00 - 72.00 %    Lymphocytes 30.80 22.00 - 41.00 %    Monocytes 10.00 0.00 - 13.40 %    Eosinophils 1.50 0.00 - 6.90 %    Basophils 0.70 0.00 - 1.80 %    Immature Granulocytes 0.90 0.00 - 0.90 %    Nucleated RBC 0.00 /100 WBC    Neutrophils (Absolute) 4.77 1.82 - 7.42 K/uL    Lymphs (Absolute) 2.62 1.00 - 4.80 K/uL    Monos (Absolute) 0.85 0.00 - 0.85 K/uL    Eos (Absolute) 0.13 0.00 - 0.51 K/uL    Baso (Absolute) 0.06 0.00 - 0.12 K/uL    Immature Granulocytes (abs) 0.08 0.00 - 0.11 K/uL    NRBC (Absolute) 0.00 K/uL   COMP METABOLIC PANEL   Result Value Ref Range    Sodium 139 135 - 145 mmol/L    Potassium 4.2 3.6 - 5.5  mmol/L    Chloride 102 96 - 112 mmol/L    Co2 27 20 - 33 mmol/L    Anion Gap 10.0 0.0 - 11.9    Glucose 93 65 - 99 mg/dL    Bun 25 (H) 8 - 22 mg/dL    Creatinine 1.16 0.50 - 1.40 mg/dL    Calcium 8.8 8.5 - 10.5 mg/dL    AST(SGOT) 32 12 - 45 U/L    ALT(SGPT) 25 2 - 50 U/L    Alkaline Phosphatase 47 30 - 99 U/L    Total Bilirubin 0.8 0.1 - 1.5 mg/dL    Albumin 4.5 3.2 - 4.9 g/dL    Total Protein 6.5 6.0 - 8.2 g/dL    Globulin 2.0 1.9 - 3.5 g/dL    A-G Ratio 2.3 g/dL   LIPASE   Result Value Ref Range    Lipase 10 (L) 11 - 82 U/L   URINALYSIS (UA)   Result Value Ref Range    Color Yellow     Character Clear     Specific Gravity 1.029 <1.035    Ph 5.0 5.0 - 8.0    Glucose Negative Negative mg/dL    Ketones 15 (A) Negative mg/dL    Protein Negative Negative mg/dL    Bilirubin Negative Negative    Urobilinogen, Urine 1.0 Negative    Nitrite Negative Negative    Leukocyte Esterase Negative Negative    Occult Blood Negative Negative    Micro Urine Req see below    APTT   Result Value Ref Range    APTT 29.0 24.7 - 36.0 sec   PROTHROMBIN TIME (INR)   Result Value Ref Range    PT 13.8 12.0 - 14.6 sec    INR 1.09 0.87 - 1.13   ESTIMATED GFR   Result Value Ref Range    GFR If African American >60 >60 mL/min/1.73 m 2    GFR If Non African American >60 >60 mL/min/1.73 m 2        RADIOLOGY/PROCEDURES  CT-ABDOMEN-PELVIS WITH   Final Result      1.  Hepatosplenomegaly.   2.  Hepatic steatosis and/or diffuse hepatocellular disease.   3.  Small fat-containing umbilical hernia.   4.  Multiple enlarged bilateral lymph nodes. This is again a nonspecific finding which could be seen in setting of infection, inflammation or neoplasm.          COURSE & MEDICAL DECISION MAKING  Pertinent Labs & Imaging studies reviewed. (See chart for details)    Patient presents the emergency department with right lower quadrant pain and bloody stool.  Broad original diagnosis was considered including appendicitis, Meckel's diverticulum, colitis, to name a  few.    The patient was worked up at [facility], records were obtained and the results were reviewed.  The patient does not have any significant abnormality in his labs there.    Because he has tenderness the right lower quadrant did a CT scan.  He is enlarged liver.  Appendix was identified as normal on CT per the radiologist.  He does have some lymphadenopathy.  \  CBC, CMP were normal.  Except for some mild dehydration which is supported by an elevated BUN and creatinine of 0.25 and 1.16 respectively.  Urinalysis are normal/        I think it is unlikely the patient has appendicitis.  His normal white count, no fever, does not have a clinics history of appendicitis is a normal-appearing appendix on CT.  He is tender in the right lower quadrant.  Certainly colitis remains the differential diagnosis.  He has been reexamined on several occasions remains quite tender.  Because of his pain, tenderness hematochezia he will be admitted for observation.  He is also dehydrated.    I patient was given IV fluid hydration because he appears dehydrated is dry mucous membranes and elevated BUN and creatinine.  He was reassessed after this therapy and is improved      Patient be admitted to hospice for continued workup and treatment.  Care transferred to Dr. Osman     FINAL IMPRESSION  1. Hematochezia    2. RLQ abdominal pain        2.   3.         Electronically signed by: Adonis Hercules, 7/27/2018 1:54 PM

## 2018-07-27 NOTE — ED TRIAGE NOTES
"Norm Prabhakar    Chief Complaint   Patient presents with   • RLQ Pain     x 2 days   • Bloody Stools     x 2 days bright red       BIBA from [facility]. Pt went to [facility] for these symptoms on Wednesday and was sent out and to F/U with GI.     Blood Pressure: 121/69, Pulse: 75, Respiration: 16, Temperature: 36.4 °C (97.5 °F), Height: 198.1 cm (6' 6\"), Weight: (!) 137 kg (302 lb 0.5 oz), BMI (Calculated): 34.9, BSA (Calculated): 2.7, O2 Delivery: None (Room Air)      "

## 2018-07-27 NOTE — ED NOTES
KINDER FALL RISK       RISK  Present to ED b/c of fall (syncope, seizure, or ALOC) no  Age  >70 no  Altered Mental Status (Intoxicated with alcohol or substance confusion, inability to follow instructions, disorientation) no  Impaired Mobility (Ambulates or transfers with assistive devices or assist. Ambulates with unsteady gait and no assistance.  Unable to ambulate to transfer.) no  Nursing Judgement (Bowel or bladder incontinence, diarrhea, urinary frequency or urgency, leg weakness, orthostatic hypotension, dizziness or vertigo, narcotic use.) no    YES TO ANY RISK = HIGH FALL RISK     1. Move patient closer to nurses stations  2. Familiarize the patient with environment  3. Place call light within reach and demonstrate call light use  4. Keep patients personal possessions within patient safe reach (if appropriate)   5. Place stretcher in low position and brakes locked  6.Place yellow socks and armband on patient   7. Place green triangle on patients door  8. Give patient urinal if applicable  9. Keep floor surfaces clean and dry  10.Keep patient care areas uncluttered  11. Use a lap belt or posey vest   12. Assess patient hourly for :Pain, persona needs, position change, and call light access.

## 2018-07-28 PROBLEM — E66.811 CLASS 1 OBESITY DUE TO EXCESS CALORIES WITH SERIOUS COMORBIDITY AND BODY MASS INDEX (BMI) OF 34.0 TO 34.9 IN ADULT: Status: ACTIVE | Noted: 2018-07-28

## 2018-07-28 PROBLEM — E66.09 CLASS 1 OBESITY DUE TO EXCESS CALORIES WITH SERIOUS COMORBIDITY AND BODY MASS INDEX (BMI) OF 34.0 TO 34.9 IN ADULT: Status: ACTIVE | Noted: 2018-07-28

## 2018-07-28 PROBLEM — R10.9 INTRACTABLE ABDOMINAL PAIN: Status: ACTIVE | Noted: 2018-07-28

## 2018-07-28 PROBLEM — E66.9 OBESITY: Status: ACTIVE | Noted: 2018-07-28

## 2018-07-28 LAB
ALBUMIN SERPL BCP-MCNC: 3.9 G/DL (ref 3.2–4.9)
ALBUMIN/GLOB SERPL: 2.1 G/DL
ALP SERPL-CCNC: 43 U/L (ref 30–99)
ALT SERPL-CCNC: 22 U/L (ref 2–50)
ANION GAP SERPL CALC-SCNC: 8 MMOL/L (ref 0–11.9)
AST SERPL-CCNC: 24 U/L (ref 12–45)
BILIRUB SERPL-MCNC: 0.5 MG/DL (ref 0.1–1.5)
BUN SERPL-MCNC: 21 MG/DL (ref 8–22)
CALCIUM SERPL-MCNC: 8.1 MG/DL (ref 8.5–10.5)
CHLORIDE SERPL-SCNC: 106 MMOL/L (ref 96–112)
CHOLEST SERPL-MCNC: 91 MG/DL (ref 100–199)
CO2 SERPL-SCNC: 25 MMOL/L (ref 20–33)
CREAT SERPL-MCNC: 0.79 MG/DL (ref 0.5–1.4)
EKG IMPRESSION: NORMAL
ERYTHROCYTE [DISTWIDTH] IN BLOOD BY AUTOMATED COUNT: 43.2 FL (ref 35.9–50)
ERYTHROCYTE [DISTWIDTH] IN BLOOD BY AUTOMATED COUNT: 44.5 FL (ref 35.9–50)
GLOBULIN SER CALC-MCNC: 1.9 G/DL (ref 1.9–3.5)
GLUCOSE BLD-MCNC: 102 MG/DL (ref 65–99)
GLUCOSE BLD-MCNC: 117 MG/DL (ref 65–99)
GLUCOSE BLD-MCNC: 163 MG/DL (ref 65–99)
GLUCOSE BLD-MCNC: 91 MG/DL (ref 65–99)
GLUCOSE SERPL-MCNC: 174 MG/DL (ref 65–99)
HCT VFR BLD AUTO: 37.1 % (ref 42–52)
HCT VFR BLD AUTO: 37.2 % (ref 42–52)
HDLC SERPL-MCNC: 24 MG/DL
HGB BLD-MCNC: 12.6 G/DL (ref 14–18)
HGB BLD-MCNC: 12.6 G/DL (ref 14–18)
LACTATE BLD-SCNC: 1.2 MMOL/L (ref 0.5–2)
LACTATE BLD-SCNC: 2.4 MMOL/L (ref 0.5–2)
LDLC SERPL CALC-MCNC: 21 MG/DL
MAGNESIUM SERPL-MCNC: 2.1 MG/DL (ref 1.5–2.5)
MCH RBC QN AUTO: 31.6 PG (ref 27–33)
MCH RBC QN AUTO: 31.6 PG (ref 27–33)
MCHC RBC AUTO-ENTMCNC: 33.9 G/DL (ref 33.7–35.3)
MCHC RBC AUTO-ENTMCNC: 34 G/DL (ref 33.7–35.3)
MCV RBC AUTO: 93 FL (ref 81.4–97.8)
MCV RBC AUTO: 93.2 FL (ref 81.4–97.8)
PHOSPHATE SERPL-MCNC: 2.8 MG/DL (ref 2.5–4.5)
PLATELET # BLD AUTO: 219 K/UL (ref 164–446)
PLATELET # BLD AUTO: 225 K/UL (ref 164–446)
PMV BLD AUTO: 9.7 FL (ref 9–12.9)
PMV BLD AUTO: 9.8 FL (ref 9–12.9)
POTASSIUM SERPL-SCNC: 3.8 MMOL/L (ref 3.6–5.5)
PROT SERPL-MCNC: 5.8 G/DL (ref 6–8.2)
RBC # BLD AUTO: 3.99 M/UL (ref 4.7–6.1)
RBC # BLD AUTO: 3.99 M/UL (ref 4.7–6.1)
SODIUM SERPL-SCNC: 139 MMOL/L (ref 135–145)
TRIGL SERPL-MCNC: 231 MG/DL (ref 0–149)
TSH SERPL DL<=0.005 MIU/L-ACNC: 8.7 UIU/ML (ref 0.38–5.33)
WBC # BLD AUTO: 6.9 K/UL (ref 4.8–10.8)
WBC # BLD AUTO: 7.4 K/UL (ref 4.8–10.8)

## 2018-07-28 PROCEDURE — 700105 HCHG RX REV CODE 258: Performed by: FAMILY MEDICINE

## 2018-07-28 PROCEDURE — 36415 COLL VENOUS BLD VENIPUNCTURE: CPT

## 2018-07-28 PROCEDURE — 83605 ASSAY OF LACTIC ACID: CPT

## 2018-07-28 PROCEDURE — 83735 ASSAY OF MAGNESIUM: CPT

## 2018-07-28 PROCEDURE — 99233 SBSQ HOSP IP/OBS HIGH 50: CPT | Performed by: FAMILY MEDICINE

## 2018-07-28 PROCEDURE — 85027 COMPLETE CBC AUTOMATED: CPT

## 2018-07-28 PROCEDURE — 80053 COMPREHEN METABOLIC PANEL: CPT

## 2018-07-28 PROCEDURE — A9270 NON-COVERED ITEM OR SERVICE: HCPCS | Performed by: INTERNAL MEDICINE

## 2018-07-28 PROCEDURE — 700105 HCHG RX REV CODE 258: Performed by: INTERNAL MEDICINE

## 2018-07-28 PROCEDURE — 700102 HCHG RX REV CODE 250 W/ 637 OVERRIDE(OP): Performed by: INTERNAL MEDICINE

## 2018-07-28 PROCEDURE — 80061 LIPID PANEL: CPT

## 2018-07-28 PROCEDURE — G0378 HOSPITAL OBSERVATION PER HR: HCPCS

## 2018-07-28 PROCEDURE — 84100 ASSAY OF PHOSPHORUS: CPT

## 2018-07-28 PROCEDURE — 700102 HCHG RX REV CODE 250 W/ 637 OVERRIDE(OP): Performed by: FAMILY MEDICINE

## 2018-07-28 PROCEDURE — 82962 GLUCOSE BLOOD TEST: CPT | Mod: 91

## 2018-07-28 PROCEDURE — 84443 ASSAY THYROID STIM HORMONE: CPT

## 2018-07-28 PROCEDURE — A9270 NON-COVERED ITEM OR SERVICE: HCPCS | Performed by: FAMILY MEDICINE

## 2018-07-28 PROCEDURE — 83036 HEMOGLOBIN GLYCOSYLATED A1C: CPT

## 2018-07-28 RX ORDER — SODIUM CHLORIDE 9 MG/ML
500 INJECTION, SOLUTION INTRAVENOUS ONCE
Status: COMPLETED | OUTPATIENT
Start: 2018-07-28 | End: 2018-07-28

## 2018-07-28 RX ORDER — POLYETHYLENE GLYCOL 3350 17 G/17G
1 POWDER, FOR SOLUTION ORAL
Status: DISCONTINUED | OUTPATIENT
Start: 2018-07-28 | End: 2018-07-29 | Stop reason: HOSPADM

## 2018-07-28 RX ORDER — DEXTROSE MONOHYDRATE 25 G/50ML
25 INJECTION, SOLUTION INTRAVENOUS
Status: DISCONTINUED | OUTPATIENT
Start: 2018-07-28 | End: 2018-07-29 | Stop reason: HOSPADM

## 2018-07-28 RX ORDER — SODIUM CHLORIDE 9 MG/ML
INJECTION, SOLUTION INTRAVENOUS CONTINUOUS
Status: DISCONTINUED | OUTPATIENT
Start: 2018-07-28 | End: 2018-07-29 | Stop reason: HOSPADM

## 2018-07-28 RX ADMIN — ATORVASTATIN CALCIUM 80 MG: 20 TABLET, FILM COATED ORAL at 19:30

## 2018-07-28 RX ADMIN — SODIUM CHLORIDE, POTASSIUM CHLORIDE, SODIUM LACTATE AND CALCIUM CHLORIDE: 600; 310; 30; 20 INJECTION, SOLUTION INTRAVENOUS at 06:32

## 2018-07-28 RX ADMIN — LEVETIRACETAM 500 MG: 500 TABLET ORAL at 06:33

## 2018-07-28 RX ADMIN — PRAZOSIN HYDROCHLORIDE 1 MG: 1 CAPSULE ORAL at 19:31

## 2018-07-28 RX ADMIN — VITAMIN D, TAB 1000IU (100/BT) 4000 UNITS: 25 TAB at 19:30

## 2018-07-28 RX ADMIN — DIVALPROEX SODIUM 1500 MG: 500 TABLET, DELAYED RELEASE ORAL at 18:11

## 2018-07-28 RX ADMIN — SODIUM CHLORIDE: 9 INJECTION, SOLUTION INTRAVENOUS at 18:09

## 2018-07-28 RX ADMIN — LEVETIRACETAM 500 MG: 500 TABLET ORAL at 18:10

## 2018-07-28 RX ADMIN — OXYCODONE HYDROCHLORIDE 5 MG: 5 TABLET ORAL at 09:04

## 2018-07-28 RX ADMIN — POLYETHYLENE GLYCOL 3350 1 PACKET: 17 POWDER, FOR SOLUTION ORAL at 12:32

## 2018-07-28 RX ADMIN — DIVALPROEX SODIUM 500 MG: 500 TABLET, DELAYED RELEASE ORAL at 06:33

## 2018-07-28 RX ADMIN — BUSPIRONE HYDROCHLORIDE 10 MG: 10 TABLET ORAL at 18:11

## 2018-07-28 RX ADMIN — OXYCODONE HYDROCHLORIDE 5 MG: 5 TABLET ORAL at 18:10

## 2018-07-28 RX ADMIN — BUSPIRONE HYDROCHLORIDE 10 MG: 10 TABLET ORAL at 12:00

## 2018-07-28 RX ADMIN — SODIUM CHLORIDE: 9 INJECTION, SOLUTION INTRAVENOUS at 13:27

## 2018-07-28 RX ADMIN — SODIUM CHLORIDE 500 ML: 9 INJECTION, SOLUTION INTRAVENOUS at 14:13

## 2018-07-28 RX ADMIN — MIRTAZAPINE 15 MG: 15 TABLET, FILM COATED ORAL at 19:30

## 2018-07-28 RX ADMIN — BUSPIRONE HYDROCHLORIDE 10 MG: 10 TABLET ORAL at 06:33

## 2018-07-28 RX ADMIN — LEVOTHYROXINE SODIUM 125 MCG: 125 TABLET ORAL at 06:33

## 2018-07-28 ASSESSMENT — ENCOUNTER SYMPTOMS
MUSCULOSKELETAL NEGATIVE: 1
BLOOD IN STOOL: 1
DIARRHEA: 1
SHORTNESS OF BREATH: 0
CARDIOVASCULAR NEGATIVE: 1
BLURRED VISION: 0
ABDOMINAL PAIN: 1
VOMITING: 1
FALLS: 0
ORTHOPNEA: 0
CONSTITUTIONAL NEGATIVE: 1
DEPRESSION: 0
SPUTUM PRODUCTION: 0
MYALGIAS: 0
RESPIRATORY NEGATIVE: 1
FEVER: 0
HEADACHES: 0
CLAUDICATION: 0
FLANK PAIN: 0
DIZZINESS: 0
DOUBLE VISION: 0
WEIGHT LOSS: 0
BACK PAIN: 0
HEMOPTYSIS: 0
DIAPHORESIS: 0
CHILLS: 0
NECK PAIN: 0
COUGH: 0
WHEEZING: 0
NAUSEA: 1
WEAKNESS: 0
PND: 0
PALPITATIONS: 0
EYES NEGATIVE: 1
CONSTIPATION: 0
NEUROLOGICAL NEGATIVE: 1

## 2018-07-28 ASSESSMENT — PAIN SCALES - GENERAL
PAINLEVEL_OUTOF10: 9
PAINLEVEL_OUTOF10: 0
PAINLEVEL_OUTOF10: 9

## 2018-07-28 NOTE — ASSESSMENT & PLAN NOTE
- With intractable nausea and vomiting per patient  - Also with diarrhea, intractable with bright red blood per rectum  - Condition not consistent with laboratory findings of a normal CBC, metabolic panel without any derangements  - CT abdomen (7/27):  Hepatosplenomegaly.  Hepatic steatosis and/or diffuse hepatocellular disease. Small fat-containing umbilical hernia. Multiple enlarged bilateral lymph nodes. This is again a nonspecific finding which could be seen in setting of infection, inflammation or neoplasm.  - If Diabetes mellitus is uncontrolled, gastroparesis may be contributing but unlikely to present with diarrhea/blood in bowel movements  - Continue symptomatic management with antiemetics, IV fluid hydration and pain control  - Check lactic acid levels normal  - Check TSH, stool cultures  - Consider GI consult

## 2018-07-28 NOTE — ED NOTES
Patient sleeping on gurney in R lateral position. No acute distress. No behavioral pain indicators noted at this time.. Call bell within reach. Awaiting admit to floor.

## 2018-07-28 NOTE — PROGRESS NOTES
"Hospital Medicine Daily Progress Note    Date of Service  7/28/2018    Chief Complaint  38 y.o. male admitted 7/27/2018 with  Patient presented with similar complaints to [facility] emergency room yesterday, no concerning findings were noted and he was discharged.  He presents today to our emergency room for further evaluation.  Patient reports that he has had abdominal pain all day and they were never able to tell him yesterday that why he had abdominal pain.  He reports his abdominal pain to be in the right lower quadrant, sharp/stabbing in character and 10 out of 10 in intensity.  It is nonradiating.  This is associated with intractable nausea and vomiting and he reported he has vomited profusely without any blood in his vomitus prior to presentation to the emergency room.  Otherwise he also reports that he has had runny, profuse diarrhea with blood in his bowel movements.  He reports that he has had blood in his bowel movements for the last 2-3 days.  He reported nobody at [facility] paid Attention to his symptoms.  Otherwise no fever or chills.  No headaches.  No chest pain or shortness of breath.  No cough.  Reports history of chronic asthma without any oxygen needs at baseline.  No urinary burning, or any other genitourinary symptoms.  No lower extremity swelling.    Interval Problem Update  Patient still complaining of abdominal pain and rates it at \"10/10\" while playing video game on his phone. Patient appears calm and not in any distress during clinical encounter. Also states he is still having BRBPR but no bowel movements since admission.    Consultants/Specialty  None    Disposition  Home    Review of Systems  Review of Systems   Constitutional: Negative.    HENT: Negative.    Eyes: Negative.    Respiratory: Negative.    Cardiovascular: Negative.    Gastrointestinal: Positive for abdominal pain and blood in stool.   Musculoskeletal: Negative.    Neurological: Negative.  "   All other systems reviewed and are negative.       Physical Exam  Blood Pressure: (!) 96/67 (RN notified)   Temperature: 36.1 °C (97 °F)   Pulse: 61   Respiration: 16   Pulse Oximetry: 96 %     Physical Exam   Constitutional: He is oriented to person, place, and time. No distress.   HENT:   Head: Normocephalic and atraumatic.   Eyes: Pupils are equal, round, and reactive to light.   Neck: Normal range of motion. Neck supple.   Cardiovascular: Normal rate, regular rhythm and normal heart sounds.    Pulmonary/Chest: Effort normal and breath sounds normal.   Abdominal: Soft. Bowel sounds are normal. He exhibits no distension.   Musculoskeletal: Normal range of motion.   Neurological: He is alert and oriented to person, place, and time.   Skin: He is not diaphoretic.   Vitals reviewed.      Fluids    Intake/Output Summary (Last 24 hours) at 07/28/18 0934  Last data filed at 07/27/18 2130   Gross per 24 hour   Intake              240 ml   Output                0 ml   Net              240 ml       Laboratory  Recent Labs      07/27/18   1313  07/28/18   0051   WBC  8.5  7.4   RBC  4.30*  3.99*   HEMOGLOBIN  13.7*  12.6*   HEMATOCRIT  40.2*  37.1*   MCV  93.5  93.0   MCH  31.9  31.6   MCHC  34.1  34.0   RDW  43.9  43.2   PLATELETCT  260  219   MPV  9.9  9.7     Recent Labs      07/27/18   1313  07/28/18   0052   SODIUM  139  139   POTASSIUM  4.2  3.8   CHLORIDE  102  106   CO2  27  25   GLUCOSE  93  174*   BUN  25*  21   CREATININE  1.16  0.79   CALCIUM  8.8  8.1*     Recent Labs      07/27/18   1313   APTT  29.0   INR  1.09         Recent Labs      07/28/18   0052   TRIGLYCERIDE  231*   HDL  24*   LDL  21           Assessment/Plan  Intractable abdominal pain- (present on admission)   Assessment & Plan    - With intractable nausea and vomiting per patient  - Also with diarrhea, intractable with bright red blood per rectum  - Condition not consistent with laboratory findings of a normal CBC, metabolic panel without any  derangements  - CT abdomen (7/27):  Hepatosplenomegaly.  Hepatic steatosis and/or diffuse hepatocellular disease. Small fat-containing umbilical hernia. Multiple enlarged bilateral lymph nodes. This is again a nonspecific finding which could be seen in setting of infection, inflammation or neoplasm.  - If Diabetes mellitus is uncontrolled, gastroparesis may be contributing but unlikely to present with diarrhea/blood in bowel movements  - Continue symptomatic management with antiemetics, IV fluid hydration and pain control  - Check lactic acid levels normal  - Check TSH, stool cultures  - Consider GI consult        Class 1 obesity due to excess calories with serious comorbidity and body mass index (BMI) of 34.0 to 34.9 in adult- (present on admission)   Assessment & Plan    Body mass index is 34.9 kg/m².        Type 2 diabetes mellitus without complication, without long-term current use of insulin (HCC)- (present on admission)   Assessment & Plan    Holding metformin, sliding scale insulin for now  Previous is supposed to be on insulin, patient has stopped his  Only on metformin  Check A1c and lipid profile        Bipolar disorder (HCC)- (present on admission)   Assessment & Plan    Continue at home regimen, outpatient psychiatry follow-up        Seizure (HCC)- (present on admission)   Assessment & Plan    Continue at home regimen        Acquired hypothyroidism- (present on admission)   Assessment & Plan    - Continue Synthroid  - TSH PENDING

## 2018-07-28 NOTE — PROGRESS NOTES
Pt alert and oriented. Pt seen resting in bed comfortably this morning. Pt easily arousable. Hypotensive this am, reporting some dizziness while sitting up on the edge of the bed. Pt received 500ml bolus of NS, increased BP after bolus. Reported 8/10 pain to RLQ. Medicated with PRN oxycodone. BG in the 90's this am. Encouraged pt to eat breakfast, ate 100%. Pt encouraged to get out of bed for meals. Voiding via urinal. No BM this admit, PRN miralax given for constipation. NS running at 100ml/hr. WCTM.

## 2018-07-28 NOTE — RESPIRATORY CARE
COPD EDUCATION by COPD CLINICAL EDUCATOR  7/28/2018 at 6:36 AM by Lupe Milan     Patient reviewed by COPD education team. Patient does not qualify for COPD program.

## 2018-07-28 NOTE — ASSESSMENT & PLAN NOTE
Holding metformin, sliding scale insulin for now  Previous is supposed to be on insulin, patient has stopped his  Only on metformin  Check A1c and lipid profile

## 2018-07-28 NOTE — CARE PLAN
Problem: Bowel/Gastric:  Goal: Normal bowel function is maintained or improved  Outcome: PROGRESSING AS EXPECTED  Pt has not had a bm since admission. Need a stool sample, pt is aware of this. PRN medication to be given to pt for constipation

## 2018-07-28 NOTE — H&P
Hospital Medicine History & Physical Note    Date of Service  07/27/2018    Primary Care Physician  Pcp Pt States None    Consultants  None    Code Status  FULL CODE     Chief Complaint  Abdominal pain, N/V/D & BRBPR    History of Presenting Illness  38 y.o. male who presented 7/27/2018 with above complaints.  Patient presented with similar complaints to [facility] emergency room yesterday, no concerning findings were noted and he was discharged.  He presents today to our emergency room for further evaluation.  Patient reports that he has had abdominal pain all day and they were never able to tell him yesterday that why he had abdominal pain.  He reports his abdominal pain to be in the right lower quadrant, sharp/stabbing in character and 10 out of 10 in intensity.  It is nonradiating.  This is associated with intractable nausea and vomiting and he reported he has vomited profusely without any blood in his vomitus prior to presentation to the emergency room.  Otherwise he also reports that he has had runny, profuse diarrhea with blood in his bowel movements.  He reports that he has had blood in his bowel movements for the last 2-3 days.  He reported nobody at [facility] paid Attention to his symptoms.  Otherwise no fever or chills.  No headaches.  No chest pain or shortness of breath.  No cough.  Reports history of chronic asthma without any oxygen needs at baseline.  No urinary burning, or any other genitourinary symptoms.  No lower extremity swelling.    Review of Systems  Review of Systems   Constitutional: Negative for chills, diaphoresis, fever, malaise/fatigue and weight loss.   HENT: Negative for hearing loss and tinnitus.    Eyes: Negative for blurred vision and double vision.   Respiratory: Negative for cough, hemoptysis, sputum production, shortness of breath and wheezing.    Cardiovascular: Negative for chest pain, palpitations, orthopnea, claudication, leg swelling and  "PND.   Gastrointestinal: Positive for abdominal pain, blood in stool, diarrhea, nausea and vomiting. Negative for constipation and melena.   Genitourinary: Negative for dysuria, flank pain, frequency, hematuria and urgency.   Musculoskeletal: Negative for back pain, falls, joint pain, myalgias and neck pain.   Skin: Negative for itching and rash.   Neurological: Negative for dizziness, weakness and headaches.   Psychiatric/Behavioral: Negative for depression and suicidal ideas.       Past Medical History   has a past medical history of Anxiety; ASTHMA; Bipolar 1 disorder ([facility]); Depression; Fall; Glaucoma; Glaucoma (1982); Hypothyroidism; Indigestion; Mental disorder; Murmur; Pneumonia; Psychiatric problem (2002); S/P thyroidectomy; Seizure ([facility]) (2010); Seizure disorder ([facility]); and Unspecified disorder of thyroid.    Surgical History   has a past surgical history that includes eye surgery; thyroid lobectomy; and other.     Family History  family history includes Heart Disease in his mother; Hypertension in his mother; Lung Disease in his mother; Stroke in his maternal grandmother.     Social History   reports that he quit smoking about 2 months ago. His smoking use included Cigarettes and Cigars. He smoked 0.25 packs per day. He has never used smokeless tobacco. He reports that he uses drugs, including Inhaled. He reports that he does not drink alcohol.    Allergies  Allergies   Allergen Reactions   • Abilify Unspecified     \"Feeling tired, like I don't even know whats going on around me\"   • Fish      Pt reports fish causes him to be sick to his stomach         Medications  Prior to Admission Medications   Prescriptions Last Dose Informant Patient Reported? Taking?   Cholecalciferol ([facility] D3) 2000 UNIT Cap 7/26/2018 at 2000 MAR from Other Facility Yes Yes   Sig: Take 4,000 Units by mouth every bedtime.   atorvastatin (LIPITOR) 80 MG tablet 7/26/2018 at 2000 MAR from Other Facility Yes Yes   Sig: Take 80 mg by mouth " every evening.   busPIRone (BUSPAR) 10 MG Tab 7/27/2018 at 0800 MAR from Other Facility Yes Yes   Sig: Take 10 mg by mouth 3 times a day.   divalproex (DEPAKOTE) 500 MG Tablet Delayed Response 7/27/2018 at 0800 MAR from Other Facility Yes No   Sig: Take 500-1,500 mg by mouth 2 Times a Day. 500 mg AM  1500 mg PM   glimepiride (AMARYL) 4 MG Tab 7/27/2018 at 0800 MAR from Other Facility Yes Yes   Sig: Take 4 mg by mouth every morning.   hydrOXYzine pamoate (VISTARIL) 25 MG Cap 7/27/2018 at 0800 MAR from Other Facility Yes Yes   Sig: Take 25 mg by mouth 3 times a day as needed for Anxiety.   levETIRAcetam (KEPPRA) 500 MG Tab 7/27/2018 at 0800 MAR from Other Facility No No   Sig: Take 1 Tab by mouth 2 times a day.   levothyroxine (SYNTHROID) 125 MCG Tab 7/27/2018 at 0730 MAR from Other Facility No No   Sig: Take 1 Tab by mouth Every morning on an empty stomach.   metFORMIN (GLUCOPHAGE) 1000 MG tablet 7/27/2018 at 0800 MAR from Other Facility No No   Sig: Take 1 Tab by mouth 2 times a day, with meals.   mirtazapine (REMERON) 30 MG Tab tablet 7/26/2018 at 2000 MAR from Other Facility Yes Yes   Sig: Take 15-30 mg by mouth every evening.   prazosin (MINIPRESS) 1 MG Cap 7/26/2018 at 2000 MAR from Other Facility Yes Yes   Sig: Take 1 mg by mouth every evening.      Facility-Administered Medications: None       Physical Exam  Blood Pressure: 106/67   Temperature: 36.4 °C (97.5 °F)   Pulse: 88   Respiration: 16   Pulse Oximetry: 95 %     Physical Exam   Constitutional: He is oriented to person, place, and time. He appears well-developed and well-nourished. No distress.   HENT:   Head: Normocephalic.   Mouth/Throat: Oropharynx is clear and moist. No oropharyngeal exudate.   Eyes: Pupils are equal, round, and reactive to light. Conjunctivae and EOM are normal. No scleral icterus.   Neck: No JVD present.   Cardiovascular: Normal rate, regular rhythm and normal heart sounds.    No murmur heard.  Pulses:       Posterior tibial pulses  are 2+ on the right side, and 2+ on the left side.   Cap refill < 3s   Pulmonary/Chest: Effort normal and breath sounds normal. No stridor. No respiratory distress. He has no wheezes. He has no rales.   Abdominal: Soft. Bowel sounds are normal. He exhibits no distension. There is tenderness. There is no rebound and no guarding.   RLQ tenderness. No peritoneal signs.    Musculoskeletal: He exhibits no edema, tenderness or deformity.   Lymphadenopathy:     He has no cervical adenopathy.   Neurological: He is alert and oriented to person, place, and time. He has normal reflexes. No cranial nerve deficit.   Skin: Skin is warm and dry. He is not diaphoretic.   Psychiatric: He has a normal mood and affect. His behavior is normal. Judgment and thought content normal.       Laboratory:  Recent Labs      07/27/18   1313   WBC  8.5   RBC  4.30*   HEMOGLOBIN  13.7*   HEMATOCRIT  40.2*   MCV  93.5   MCH  31.9   MCHC  34.1   RDW  43.9   PLATELETCT  260   MPV  9.9     Recent Labs      07/27/18   1313   SODIUM  139   POTASSIUM  4.2   CHLORIDE  102   CO2  27   GLUCOSE  93   BUN  25*   CREATININE  1.16   CALCIUM  8.8     Recent Labs      07/27/18   1313   ALTSGPT  25   ASTSGOT  32   ALKPHOSPHAT  47   TBILIRUBIN  0.8   LIPASE  10*   GLUCOSE  93     Recent Labs      07/27/18   1313   APTT  29.0   INR  1.09             Lab Results   Component Value Date    TROPONINI <0.01 07/27/2018       Urinalysis:    Lab Results   Component Value Date    SPECGRAVITY 1.029 07/27/2018    GLUCOSEUR Negative 07/27/2018    KETONES 15 (A) 07/27/2018    NITRITE Negative 07/27/2018    WBCURINE 0-2 (A) 05/26/2015    RBCURINE 0-2 (A) 05/26/2015        Imaging:  CT-ABDOMEN-PELVIS WITH   Final Result      1.  Hepatosplenomegaly.   2.  Hepatic steatosis and/or diffuse hepatocellular disease.   3.  Small fat-containing umbilical hernia.   4.  Multiple enlarged bilateral lymph nodes. This is again a nonspecific finding which could be seen in setting of infection,  inflammation or neoplasm.            Assessment/Plan:  I anticipate this patient is appropriate for observation status at this time.    Intractable abdominal pain- (present on admission)   Assessment & Plan    With intractable nausea and vomiting per patient  Also with diarrhea, intractable with bright red blood per rectum    Condition not consistent with laboratory findings of a normal CBC, metabolic panel without any derangements    Intractable pain in the emergency room, negative CT of the abdomen, only significant findings of lymphadenopathy, personally reviewed by me, no appendiceal inflammation is noted    No cardiac concerns, EKG obtained and personally reviewed and interpreted by me, no acute signs of ischemia or infarction, negative troponin    For now admission to the hospital for observation  Differentials to consider would include appendicitis not evident on imaging, Meckel's diverticulum and by Dysentery / Food borne illnesses   Unlikely PUD / Biliary disease   If Diabetes mellitus is uncontrolled, gastroparesis may be contributing but unlikely to present with diarrhea/blood in bowel movements    For now continue symptomatic management with antiemetics, IV fluid hydration and pain control  Continue close clinical monitoring  Check lactic acid levels to rule out ischemic issues  Check TSH, stool cultures    If Persistent symptoms in the morning consider surgical/gastroenterology evaluation        Class 1 obesity due to excess calories with serious comorbidity and body mass index (BMI) of 34.0 to 34.9 in adult- (present on admission)   Assessment & Plan    Body mass index is 34.9 kg/m².        Type 2 diabetes mellitus without complication, without long-term current use of insulin ([facility])- (present on admission)   Assessment & Plan    Holding metformin, sliding scale insulin for now  Previous is supposed to be on insulin, patient has stopped his  Only on metformin  Check A1c and lipid profile        Bipolar  disorder ([facility])- (present on admission)   Assessment & Plan    Continue at home regimen, outpatient psychiatry follow-up        Seizure ([facility])- (present on admission)   Assessment & Plan    Continue at home regimen        Acquired hypothyroidism- (present on admission)   Assessment & Plan    Continue Synthroid, check TSH            VTE prophylaxis: SCDs

## 2018-07-28 NOTE — PROGRESS NOTES
2 RN skin check completed with OMID Lara.   No pressure ulcers or wounds noted.  Dry, red, flaky skin to BUE; trunk with redness but blanching. Feet are dry and cracking.   Otherwise, skin is intact.

## 2018-07-28 NOTE — ED NOTES
[place] fall assessment completed. Pt is not high risk for fall at this time.  Bed locked in low position, call light in place. Personal possessions in place.  Personal needs assessed. Safety assessed. Will monitor frequently

## 2018-07-28 NOTE — CARE PLAN
Problem: Safety  Goal: Will remain free from injury  Outcome: PROGRESSING AS EXPECTED  Pt up ad derek, I have not seen pt get out of bed yet this am. Pt has a steady gait according to report. Continue to monitor and re-assess pts mobility

## 2018-07-28 NOTE — ED NOTES
Patient transported to floor in stable condition viagurney accompanied by ED Tech. All belongings accounted for.

## 2018-07-28 NOTE — PROGRESS NOTES
Pt arrived to unit. Belongings at bedside. Oriented to room and unit routine. Acknowledged understanding. Wants his phone charged so that he can call his mom. Phone charging at charge desk.

## 2018-07-28 NOTE — CARE PLAN
Problem: Communication  Goal: The ability to communicate needs accurately and effectively will improve    Intervention: Educate patient and significant other/support system about the plan of care, procedures, treatments, medications and allow for questions  Educated pt on plan of care, scheduled medications, unit routine, low fall risk. Pt acknowledges understanding. Pt is able to make needs known.      Problem: Pain Management  Goal: Pain level will decrease to patient's comfort goal    Intervention: Follow pain managment plan developed in collaboration with patient and Interdisciplinary Team  Pt complains of pain to RLQ, medicated per MAR. Has PRN pain meds available for pain control.

## 2018-07-28 NOTE — PROGRESS NOTES
Assumed care of pt, discussed plan of care. A&Ox4. RA, tolerates well. RML, RLL, LLL diminished. Complains of RLQ pain, medicated per MAR. Last BM, 07/27. Continent of bowel, bladder. Up-self with steady gait. On regular diet, takes pills whole without difficulty. IV, CDI, running LR at 125 mL/hr. Fall precautions in place; bed lowest position, treaded socks on, call light within reach. All needs met at this time.

## 2018-07-29 ENCOUNTER — PATIENT OUTREACH (OUTPATIENT)
Dept: HEALTH INFORMATION MANAGEMENT | Facility: OTHER | Age: 38
End: 2018-07-29

## 2018-07-29 VITALS
BODY MASS INDEX: 34.49 KG/M2 | OXYGEN SATURATION: 96 % | WEIGHT: 298.06 LBS | TEMPERATURE: 97 F | HEART RATE: 64 BPM | SYSTOLIC BLOOD PRESSURE: 100 MMHG | DIASTOLIC BLOOD PRESSURE: 52 MMHG | RESPIRATION RATE: 16 BRPM | HEIGHT: 78 IN

## 2018-07-29 LAB
ANION GAP SERPL CALC-SCNC: 9 MMOL/L (ref 0–11.9)
BUN SERPL-MCNC: 15 MG/DL (ref 8–22)
CALCIUM SERPL-MCNC: 8.5 MG/DL (ref 8.5–10.5)
CHLORIDE SERPL-SCNC: 107 MMOL/L (ref 96–112)
CO2 SERPL-SCNC: 25 MMOL/L (ref 20–33)
CREAT SERPL-MCNC: 0.82 MG/DL (ref 0.5–1.4)
GLUCOSE BLD-MCNC: 108 MG/DL (ref 65–99)
GLUCOSE BLD-MCNC: 117 MG/DL (ref 65–99)
GLUCOSE SERPL-MCNC: 110 MG/DL (ref 65–99)
POTASSIUM SERPL-SCNC: 4.2 MMOL/L (ref 3.6–5.5)
SODIUM SERPL-SCNC: 141 MMOL/L (ref 135–145)

## 2018-07-29 PROCEDURE — 99239 HOSP IP/OBS DSCHRG MGMT >30: CPT | Performed by: FAMILY MEDICINE

## 2018-07-29 PROCEDURE — A9270 NON-COVERED ITEM OR SERVICE: HCPCS | Performed by: INTERNAL MEDICINE

## 2018-07-29 PROCEDURE — 36415 COLL VENOUS BLD VENIPUNCTURE: CPT

## 2018-07-29 PROCEDURE — 700102 HCHG RX REV CODE 250 W/ 637 OVERRIDE(OP): Performed by: INTERNAL MEDICINE

## 2018-07-29 PROCEDURE — 80048 BASIC METABOLIC PNL TOTAL CA: CPT

## 2018-07-29 PROCEDURE — 82962 GLUCOSE BLOOD TEST: CPT

## 2018-07-29 PROCEDURE — 700105 HCHG RX REV CODE 258: Performed by: FAMILY MEDICINE

## 2018-07-29 PROCEDURE — G0378 HOSPITAL OBSERVATION PER HR: HCPCS

## 2018-07-29 RX ORDER — OXYCODONE HYDROCHLORIDE 5 MG/1
5 TABLET ORAL EVERY 6 HOURS PRN
Qty: 20 TAB | Refills: 0 | Status: SHIPPED | OUTPATIENT
Start: 2018-07-29 | End: 2018-08-03

## 2018-07-29 RX ADMIN — DIVALPROEX SODIUM 500 MG: 500 TABLET, DELAYED RELEASE ORAL at 05:11

## 2018-07-29 RX ADMIN — LEVETIRACETAM 500 MG: 500 TABLET ORAL at 05:10

## 2018-07-29 RX ADMIN — BUSPIRONE HYDROCHLORIDE 10 MG: 10 TABLET ORAL at 05:11

## 2018-07-29 RX ADMIN — OXYCODONE HYDROCHLORIDE 5 MG: 5 TABLET ORAL at 05:11

## 2018-07-29 RX ADMIN — LEVOTHYROXINE SODIUM 125 MCG: 125 TABLET ORAL at 05:10

## 2018-07-29 RX ADMIN — SODIUM CHLORIDE: 9 INJECTION, SOLUTION INTRAVENOUS at 02:42

## 2018-07-29 ASSESSMENT — PAIN SCALES - GENERAL
PAINLEVEL_OUTOF10: 9
PAINLEVEL_OUTOF10: 0

## 2018-07-29 NOTE — CARE PLAN
Problem: Safety  Goal: Will remain free from injury  Outcome: PROGRESSING AS EXPECTED  Bed locked in lowest position, treaded socks in place, call light within reach.  Pt instructed to call for assistance.    Problem: Discharge Barriers/Planning  Goal: Patient's continuum of care needs will be met  Outcome: PROGRESSING AS EXPECTED  Patient is pending d/c today.  Cab voucher needed.

## 2018-07-29 NOTE — PROGRESS NOTES
Received bedside report and accepted care of patient. Patient currently resting in bed in no visible or stated signs of distress. Pt is A&O x 4 Bed  controls on and bed in locked and lowest position. Call light and personal possessions within reach. Patient educated about use of call light due to dizziness and verbalized understanding. Pt is on continuous IV fluids 0.9% NS running at 100 ml/hour. Pt was educated about need for stool sample and verbalizes understanding.

## 2018-07-29 NOTE — DISCHARGE PLANNING
Received Transport Form @ [id number]  Spoke to Joanne @ [facility]    Transport is scheduled for 07-29-18 @*1500 going to [address] [place] [id number].  GREGORY Ortega advised of transport time.

## 2018-07-29 NOTE — PROGRESS NOTES
Received report and assumed care at 0715.  Pt is AOx4, no complaints of pain.  No scheduled medications at this time.  Assessment complete, on room air.  No bed alarm, pt is low risk per [facility] fall risk assessment.  Bed locked in lowest position, treaded socks in place, call light within reach.  Patient pending d/c, cab voucher needed from .

## 2018-07-29 NOTE — DISCHARGE PLANNING
Informed by bedside RN that the pt was ready to dc. Met with pt at bedside. Pt states he has [employer] transportation support. TC message left for Joy HAGER informing her of the pt need. Updated bedside RN.

## 2018-07-29 NOTE — CARE PLAN
Problem: Knowledge Deficit  Goal: Knowledge of disease process/condition, treatment plan, diagnostic tests, and medications will improve  Outcome: PROGRESSING AS EXPECTED  Pt educated regarding plan of care and medications. All questions answered. Pt verbalizes understanding    Problem: Fluid Volume:  Goal: Will maintain balanced intake and output  Outcome: PROGRESSING AS EXPECTED  Pt has continuous IV fluid intake with 0.9% normal saline running at 100 ml/hour.

## 2018-07-29 NOTE — DISCHARGE INSTRUCTIONS
Discharge Instructions    Discharged to group home by taxi with self. Discharged via wheelchair, hospital escort: Yes.  Special equipment needed: Not Applicable    Be sure to schedule a follow-up appointment with your primary care doctor or any specialists as instructed.     Discharge Plan:   Diet Plan: Discussed (Diabetic)  Activity Level: Discussed (Resume normal activity)  Confirmed Follow up Appointment: Appointment Scheduled  Confirmed Symptoms Management: Discussed  Medication Reconciliation Updated: Yes  Influenza Vaccine Indication: Not indicated: Previously immunized this influenza season and > 8 years of age    I understand that a diet low in cholesterol, fat, and sodium is recommended for good health. Unless I have been given specific instructions below for another diet, I accept this instruction as my diet prescription.   Other diet: Diabetic    Special Instructions: None    · Is patient discharged on Warfarin / Coumadin?   No     Depression / Suicide Risk    As you are discharged from this [facility] facility, it is important to learn how to keep safe from harming yourself.    Recognize the warning signs:  · Abrupt changes in personality, positive or negative- including increase in energy   · Giving away possessions  · Change in eating patterns- significant weight changes-  positive or negative  · Change in sleeping patterns- unable to sleep or sleeping all the time   · Unwillingness or inability to communicate  · Depression  · Unusual sadness, discouragement and loneliness  · Talk of wanting to die  · Neglect of personal appearance   · Rebelliousness- reckless behavior  · Withdrawal from people/activities they love  · Confusion- inability to concentrate     If you or a loved one observes any of these behaviors or has concerns about self-harm, here's what you can do:  · Talk about it- your feelings and reasons for harming yourself  · Remove any means that you might use to hurt yourself (examples:  pills, rope, extension cords, firearm)  · Get professional help from the community (Mental Health, Substance Abuse, psychological counseling)  · Do not be alone:Call your Safe Contact- someone whom you trust who will be there for you.  · Call your local CRISIS HOTLINE 572-4814 or 364-925-9632  · Call your local Children's Mobile Crisis Response Team [place] (445) 721-2688 or www.[employer] [place]  · Call the toll free National Suicide Prevention Hotlines   · National Suicide Prevention Lifeline 364-451-AVEM (2998)  · National Hope Line Network 800-SUICIDE (603-7174)

## 2018-07-29 NOTE — DISCHARGE SUMMARY
Discharge Summary    CHIEF COMPLAINT ON ADMISSION  Chief Complaint   Patient presents with   • RLQ Pain     x 2 days   • Bloody Stools     x 2 days bright red       Reason for Admission  Abdominal Pain     Admission Date  7/27/2018    CODE STATUS  Full Code    HPI & HOSPITAL COURSE  This is a 38 y.o. male here with abdominal pain and hematochezia Patient presented with similar complaints to [facility] emergency room yesterday, no concerning findings were noted and he was discharged.  He presents today to our emergency room for further evaluation.  Patient reports that he has had abdominal pain all day and they were never able to tell him yesterday that why he had abdominal pain.  He reports his abdominal pain to be in the right lower quadrant, sharp/stabbing in character and 10 out of 10 in intensity.  It is nonradiating.  This is associated with intractable nausea and vomiting and he reported he has vomited profusely without any blood in his vomitus prior to presentation to the emergency room.  Otherwise he also reports that he has had runny, profuse diarrhea with blood in his bowel movements.  He reports that he has had blood in his bowel movements for the last 2-3 days.  He reported nobody at [facility] paid Attention to his symptoms.  Otherwise no fever or chills.  No headaches.  No chest pain or shortness of breath.  No cough.  Reports history of chronic asthma without any oxygen needs at baseline.  No urinary burning, or any other genitourinary symptoms.  No lower extremity swelling. Patient was admitted and CT scan abdomen (7/27):  Hepatosplenomegaly.  Hepatic steatosis and/or diffuse hepatocellular disease. Small fat-containing umbilical hernia. Multiple enlarged bilateral lymph nodes. This is again a nonspecific finding which could be seen in setting of infection, inflammation or neoplasm. Patient was kept on IV fluids and has been tolerating diet. No bowel moevmeents were  produced and Hgb stayed the same at 12.6 for 2 days.        Therefore, he is discharged in good and stable condition to home with close outpatient follow-up.    The patient met 2-midnight criteria for an inpatient stay at the time of discharge.    Discharge Date  7/29/18    FOLLOW UP ITEMS POST DISCHARGE  None    DISCHARGE DIAGNOSES  Active Problems:    Intractable abdominal pain POA: Yes    Acquired hypothyroidism POA: Yes    Seizure (HCC) POA: Yes    Bipolar disorder (HCC) POA: Yes    Type 2 diabetes mellitus without complication, without long-term current use of insulin (HCC) POA: Yes    Class 1 obesity due to excess calories with serious comorbidity and body mass index (BMI) of 34.0 to 34.9 in adult POA: Yes  Resolved Problems:    * No resolved hospital problems. *      FOLLOW UP  No future appointments.  No follow-up provider specified.    MEDICATIONS ON DISCHARGE     Medication List      START taking these medications      Instructions   oxyCODONE immediate-release 5 MG Tabs  Commonly known as:  ROXICODONE   Take 1 Tab by mouth every 6 hours as needed for Severe Pain for up to 5 days.  Dose:  5 mg        CONTINUE taking these medications      Instructions   atorvastatin 80 MG tablet  Commonly known as:  LIPITOR   Take 80 mg by mouth every evening.  Dose:  80 mg     busPIRone 10 MG Tabs  Commonly known as:  BUSPAR   Take 10 mg by mouth 3 times a day.  Dose:  10 mg     [employer] D3 2000 UNIT Caps  Generic drug:  Cholecalciferol   Take 4,000 Units by mouth every bedtime.  Dose:  4000 Units     divalproex 500 MG Tbec  Commonly known as:  DEPAKOTE   Take 500-1,500 mg by mouth 2 Times a Day. 500 mg AM 1500 mg PM  Dose:  500-1500 mg     glimepiride 4 MG Tabs  Commonly known as:  AMARYL   Take 4 mg by mouth every morning.  Dose:  4 mg     hydrOXYzine pamoate 25 MG Caps  Commonly known as:  VISTARIL   Take 25 mg by mouth 3 times a day as needed for Anxiety.  Dose:  25 mg     levETIRAcetam 500 MG Tabs  Commonly known as:   "KEPPRA   Take 1 Tab by mouth 2 times a day.  Dose:  500 mg     levothyroxine 125 MCG Tabs  Commonly known as:  SYNTHROID   Take 1 Tab by mouth Every morning on an empty stomach.  Dose:  125 mcg     metformin 1000 MG tablet  Commonly known as:  GLUCOPHAGE   Take 1 Tab by mouth 2 times a day, with meals.  Dose:  1000 mg     mirtazapine 30 MG Tabs tablet  Commonly known as:  REMERON   Take 15-30 mg by mouth every evening.  Dose:  15-30 mg     prazosin 1 MG Caps  Commonly known as:  MINIPRESS   Take 1 mg by mouth every evening.  Dose:  1 mg            Allergies  Allergies   Allergen Reactions   • Abilify Unspecified     \"Feeling tired, like I don't even know whats going on around me\"   • Fish      Pt reports fish causes him to be sick to his stomach         DIET  Orders Placed This Encounter   Procedures   • Diet Order Diabetic     Standing Status:   Standing     Number of Occurrences:   1     Order Specific Question:   Diet:     Answer:   Diabetic [3]       ACTIVITY  As tolerated.  Weight bearing as tolerated    CONSULTATIONS  None    PROCEDURES  None    LABORATORY  Lab Results   Component Value Date    SODIUM 141 07/29/2018    POTASSIUM 4.2 07/29/2018    CHLORIDE 107 07/29/2018    CO2 25 07/29/2018    GLUCOSE 110 (H) 07/29/2018    BUN 15 07/29/2018    CREATININE 0.82 07/29/2018        Lab Results   Component Value Date    WBC 6.9 07/28/2018    HEMOGLOBIN 12.6 (L) 07/28/2018    HEMATOCRIT 37.2 (L) 07/28/2018    PLATELETCT 225 07/28/2018        Total time of the discharge process exceeds 31 minutes.  "

## 2018-07-29 NOTE — PROGRESS NOTES
Discharging patient home per physician order.  SW provided transportation via taxi.  Pt assisted downstairs and into taxi.  Discharged with self.  Demonstrated understanding of discharge instructions, follow up appointments, home medications, prescriptions.  Ambulating without assistance, voiding without difficulty, pain well controlled, tolerating oral medications, oxygen saturation greater than 90% , tolerating diet.  Verbalized understanding of discharge instructions.  All questions answered.  Belongings with patient at time of discharge.

## 2018-07-31 LAB
EST. AVERAGE GLUCOSE BLD GHB EST-MCNC: 146 MG/DL
HBA1C MFR BLD: 6.7 % (ref 0–5.6)

## 2018-08-05 ENCOUNTER — HOSPITAL ENCOUNTER (EMERGENCY)
Facility: MEDICAL CENTER | Age: 38
End: 2018-08-05
Attending: EMERGENCY MEDICINE
Payer: MEDICAID

## 2018-08-05 ENCOUNTER — APPOINTMENT (OUTPATIENT)
Dept: RADIOLOGY | Facility: MEDICAL CENTER | Age: 38
End: 2018-08-05
Attending: EMERGENCY MEDICINE
Payer: MEDICAID

## 2018-08-05 VITALS
HEART RATE: 80 BPM | BODY MASS INDEX: 27.26 KG/M2 | SYSTOLIC BLOOD PRESSURE: 130 MMHG | TEMPERATURE: 98.6 F | DIASTOLIC BLOOD PRESSURE: 70 MMHG | RESPIRATION RATE: 18 BRPM | OXYGEN SATURATION: 96 % | WEIGHT: 235.89 LBS

## 2018-08-05 DIAGNOSIS — Z86.69 H/O TONIC-CLONIC SEIZURES: ICD-10-CM
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
## 2018-08-05 DIAGNOSIS — R55 SYNCOPE, UNSPECIFIED SYNCOPE TYPE: ICD-10-CM

## 2018-08-05 DIAGNOSIS — S39.012A BACK STRAIN, INITIAL ENCOUNTER: ICD-10-CM

## 2018-08-05 LAB
ANION GAP SERPL CALC-SCNC: 13 MMOL/L (ref 0–11.9)
BASOPHILS # BLD AUTO: 0.5 % (ref 0–1.8)
BASOPHILS # BLD: 0.06 K/UL (ref 0–0.12)
BUN SERPL-MCNC: 24 MG/DL (ref 8–22)
CALCIUM SERPL-MCNC: 8.9 MG/DL (ref 8.5–10.5)
CHLORIDE SERPL-SCNC: 104 MMOL/L (ref 96–112)
CO2 SERPL-SCNC: 21 MMOL/L (ref 20–33)
CREAT SERPL-MCNC: 0.89 MG/DL (ref 0.5–1.4)
EKG IMPRESSION: NORMAL
EOSINOPHIL # BLD AUTO: 0.11 K/UL (ref 0–0.51)
EOSINOPHIL NFR BLD: 1 % (ref 0–6.9)
ERYTHROCYTE [DISTWIDTH] IN BLOOD BY AUTOMATED COUNT: 44.5 FL (ref 35.9–50)
GLUCOSE SERPL-MCNC: 148 MG/DL (ref 65–99)
HCT VFR BLD AUTO: 39.5 % (ref 42–52)
HGB BLD-MCNC: 13.1 G/DL (ref 14–18)
IMM GRANULOCYTES # BLD AUTO: 0.07 K/UL (ref 0–0.11)
IMM GRANULOCYTES NFR BLD AUTO: 0.6 % (ref 0–0.9)
LYMPHOCYTES # BLD AUTO: 4.14 K/UL (ref 1–4.8)
LYMPHOCYTES NFR BLD: 37.6 % (ref 22–41)
MCH RBC QN AUTO: 31.3 PG (ref 27–33)
MCHC RBC AUTO-ENTMCNC: 33.2 G/DL (ref 33.7–35.3)
MCV RBC AUTO: 94.3 FL (ref 81.4–97.8)
MONOCYTES # BLD AUTO: 1.29 K/UL (ref 0–0.85)
MONOCYTES NFR BLD AUTO: 11.7 % (ref 0–13.4)
NEUTROPHILS # BLD AUTO: 5.35 K/UL (ref 1.82–7.42)
NEUTROPHILS NFR BLD: 48.6 % (ref 44–72)
NRBC # BLD AUTO: 0 K/UL
NRBC BLD-RTO: 0 /100 WBC
PLATELET # BLD AUTO: 285 K/UL (ref 164–446)
PMV BLD AUTO: 10.6 FL (ref 9–12.9)
POTASSIUM SERPL-SCNC: 3.8 MMOL/L (ref 3.6–5.5)
RBC # BLD AUTO: 4.19 M/UL (ref 4.7–6.1)
SODIUM SERPL-SCNC: 138 MMOL/L (ref 135–145)
WBC # BLD AUTO: 11 K/UL (ref 4.8–10.8)

## 2018-08-05 PROCEDURE — 80048 BASIC METABOLIC PNL TOTAL CA: CPT

## 2018-08-05 PROCEDURE — 72040 X-RAY EXAM NECK SPINE 2-3 VW: CPT

## 2018-08-05 PROCEDURE — A9270 NON-COVERED ITEM OR SERVICE: HCPCS | Performed by: EMERGENCY MEDICINE

## 2018-08-05 PROCEDURE — 99284 EMERGENCY DEPT VISIT MOD MDM: CPT

## 2018-08-05 PROCEDURE — 700102 HCHG RX REV CODE 250 W/ 637 OVERRIDE(OP): Performed by: EMERGENCY MEDICINE

## 2018-08-05 PROCEDURE — 72080 X-RAY EXAM THORACOLMB 2/> VW: CPT

## 2018-08-05 PROCEDURE — 85025 COMPLETE CBC W/AUTO DIFF WBC: CPT

## 2018-08-05 PROCEDURE — 93005 ELECTROCARDIOGRAM TRACING: CPT

## 2018-08-05 PROCEDURE — 93005 ELECTROCARDIOGRAM TRACING: CPT | Performed by: EMERGENCY MEDICINE

## 2018-08-05 RX ORDER — ACETAMINOPHEN 325 MG/1
650 TABLET ORAL ONCE
Status: COMPLETED | OUTPATIENT
Start: 2018-08-05 | End: 2018-08-05

## 2018-08-05 RX ADMIN — ACETAMINOPHEN 650 MG: 325 TABLET, FILM COATED ORAL at 04:23

## 2018-08-05 ASSESSMENT — PAIN SCALES - GENERAL: PAINLEVEL_OUTOF10: 10

## 2018-08-05 NOTE — ED NOTES
Pt discharged home per ERP, pt in stable condition. D/C instructions and follow up given to pt. Voiced understanding. Ambulatory upon discharge from ed.

## 2018-08-05 NOTE — DISCHARGE INSTRUCTIONS
Syncope  Introduction  Syncope is when you lose temporarily pass out (faint). Signs that you may be about to pass out include:  · Feeling dizzy or light-headed.  · Feeling sick to your stomach (nauseous).  · Seeing all white or all black.  · Having cold, clammy skin.  If you passed out, get help right away. Call your local emergency services (911 in the [place] Do not drive yourself to the hospital.  Follow these instructions at home:  Pay attention to any changes in your symptoms. Take these actions to help with your condition:  · Have someone stay with you until you feel stable.  · Do not drive, use machinery, or play sports until your doctor says it is okay.  · Keep all follow-up visits as told by your doctor. This is important.  · If you start to feel like you might pass out, lie down right away and raise (elevate) your feet above the level of your heart. Breathe deeply and steadily. Wait until all of the symptoms are gone.  · Drink enough fluid to keep your pee (urine) clear or pale yellow.  · If you are taking blood pressure or heart medicine, get up slowly and spend many minutes getting ready to sit and then stand. This can help with dizziness.  · Take over-the-counter and prescription medicines only as told by your doctor.  Get help right away if:  · You have a very bad headache.  · You have unusual pain in your chest, tummy, or back.  · You are bleeding from your mouth or rectum.  · You have black or tarry poop (stool).  · You have a very fast or uneven heartbeat (palpitations).  · It hurts to breathe.  · You pass out once or more than once.  · You have jerky movements that you cannot control (seizure).  · You are confused.  · You have trouble walking.  · You are very weak.  · You have vision problems.    Follow up with your primary care physician for re-evaluation of your blood pressure.  These symptoms may be an emergency. Do not wait to see if the symptoms will go away. Get medical help right away. Call  your local emergency services (911 in the [place] Do not drive yourself to the hospital.   This information is not intended to replace advice given to you by your health care provider. Make sure you discuss any questions you have with your health care provider.  Document Released: 06/05/2009 Document Revised: 05/25/2017 Document Reviewed: 08/31/2016  © 2017 [employer]

## 2018-08-05 NOTE — ED PROVIDER NOTES
ED Provider Note    Scribed for Adonis Bustos M.D. by Sudhakar Alarcon. 8/5/2018, 3:20 AM.    Primary care provider: ANIKET Chung  Means of arrival: EMS  History obtained from: Patient  History limited by: None    CHIEF COMPLAINT  Chief Complaint   Patient presents with   • Syncope       HPI  Norm Prabhakar is a 38 y.o. male who presents to the Emergency Department for evaluation after experiencing a syncope episode just prior to arrival. Patient reports his vision went dark just prior to his fall and states he hit his head and injured his back. He currently endorses neck pain, describing it as sharp, and says attempting to moves his head exacerbates his pain. Norm does not report any alleviating factors. He endorses having a past medical history of seizures, however he is unaware if this was a seizure. Patient denies any alcohol or drug use tonight.     Norm denies any fever, chills, cough, or nausea    REVIEW OF SYSTEMS  See HPI for further details.   Pertinent positives include: Syncope, Neck pain  Pertinent negatives include: Fever, chills, cough, nausea  All other systems are negative.  C.     PAST MEDICAL HISTORY   has a past medical history of Anxiety; ASTHMA; Bipolar 1 disorder (HCC); Depression; Fall; Glaucoma; Glaucoma (1982); Hypothyroidism; Indigestion; Mental disorder; Murmur; Pneumonia; Psychiatric problem (2002); S/P thyroidectomy; Seizure (HCC) (2010); Seizure disorder ([facility]); and Unspecified disorder of thyroid.    SURGICAL HISTORY   has a past surgical history that includes eye surgery; thyroid lobectomy; and other.    SOCIAL HISTORY  Social History   Substance Use Topics   • Smoking status: Former Smoker     Packs/day: 0.25     Types: Cigarettes, Cigars     Quit date: 5/1/2018   • Smokeless tobacco: Never Used   • Alcohol use No      History   Drug Use   • Types: Inhaled     Comment: marijuana       FAMILY HISTORY  Family History   Problem Relation Age of Onset   • Hypertension  "Mother    • Heart Disease Mother    • Lung Disease Mother    • Stroke Maternal Grandmother        CURRENT MEDICATIONS  No current facility-administered medications on file prior to encounter.      Current Outpatient Prescriptions on File Prior to Encounter   Medication Sig Dispense Refill   • busPIRone (BUSPAR) 10 MG Tab Take 10 mg by mouth 3 times a day.     • prazosin (MINIPRESS) 1 MG Cap Take 1 mg by mouth every evening.     • mirtazapine (REMERON) 30 MG Tab tablet Take 15-30 mg by mouth every evening.     • hydrOXYzine pamoate (VISTARIL) 25 MG Cap Take 25 mg by mouth 3 times a day as needed for Anxiety.     • glimepiride (AMARYL) 4 MG Tab Take 4 mg by mouth every morning.     • atorvastatin (LIPITOR) 80 MG tablet Take 80 mg by mouth every evening.     • Cholecalciferol ([employer]S D3) 2000 UNIT Cap Take 4,000 Units by mouth every bedtime.     • metFORMIN (GLUCOPHAGE) 1000 MG tablet Take 1 Tab by mouth 2 times a day, with meals. 60 Tab 2   • divalproex (DEPAKOTE) 500 MG Tablet Delayed Response Take 500-1,500 mg by mouth 2 Times a Day. 500 mg AM  1500 mg PM     • levothyroxine (SYNTHROID) 125 MCG Tab Take 1 Tab by mouth Every morning on an empty stomach. 30 Tab 0       ALLERGIES  Allergies   Allergen Reactions   • Abilify Unspecified     \"Feeling tired, like I don't even know whats going on around me\"   • Fish      Pt reports fish causes him to be sick to his stomach         PHYSICAL EXAM  VITAL SIGNS: /66   Pulse 81   Temp 37 °C (98.6 °F)   Resp 16   Wt 107 kg (235 lb 14.3 oz)   SpO2 96%   BMI 27.26 kg/m²   Constitutional: Alert in no apparent distress.  HENT: No signs of trauma, Bilateral external ears normal, Nose normal.   Eyes: Pupils are equal and reactive, Conjunctiva normal, Non-icteric.   Neck: Normal range of motion, No tenderness, Supple, No stridor.   Lymphatic: No lymphadenopathy noted.   Cardiovascular: Regular rate and rhythm, no murmurs.   Thorax & Lungs: Normal breath sounds, No respiratory " distress, No wheezing, No chest tenderness.   Abdomen: Bowel sounds normal, Soft, No tenderness, No masses, No pulsatile masses. No peritoneal signs.  Skin: Warm, Dry, No erythema, No rash.   Back: No spinal or paraspinal tenderness  Extremities: Intact distal pulses, No edema, No tenderness, No cyanosis  Musculoskeletal: Good range of motion in all major joints. No tenderness to palpation or major deformities noted.   Neurologic: Alert , Normal motor function, Normal sensory function, No focal deficits noted.   Psychiatric: Affect normal, Judgment normal, Mood normal.     DIAGNOSTIC STUDIES / PROCEDURES     LABS  Results for orders placed or performed during the hospital encounter of 08/05/18   CBC WITH DIFFERENTIAL   Result Value Ref Range    WBC 11.0 (H) 4.8 - 10.8 K/uL    RBC 4.19 (L) 4.70 - 6.10 M/uL    Hemoglobin 13.1 (L) 14.0 - 18.0 g/dL    Hematocrit 39.5 (L) 42.0 - 52.0 %    MCV 94.3 81.4 - 97.8 fL    MCH 31.3 27.0 - 33.0 pg    MCHC 33.2 (L) 33.7 - 35.3 g/dL    RDW 44.5 35.9 - 50.0 fL    Platelet Count 285 164 - 446 K/uL    MPV 10.6 9.0 - 12.9 fL    Neutrophils-Polys 48.60 44.00 - 72.00 %    Lymphocytes 37.60 22.00 - 41.00 %    Monocytes 11.70 0.00 - 13.40 %    Eosinophils 1.00 0.00 - 6.90 %    Basophils 0.50 0.00 - 1.80 %    Immature Granulocytes 0.60 0.00 - 0.90 %    Nucleated RBC 0.00 /100 WBC    Neutrophils (Absolute) 5.35 1.82 - 7.42 K/uL    Lymphs (Absolute) 4.14 1.00 - 4.80 K/uL    Monos (Absolute) 1.29 (H) 0.00 - 0.85 K/uL    Eos (Absolute) 0.11 0.00 - 0.51 K/uL    Baso (Absolute) 0.06 0.00 - 0.12 K/uL    Immature Granulocytes (abs) 0.07 0.00 - 0.11 K/uL    NRBC (Absolute) 0.00 K/uL   BASIC METABOLIC PANEL   Result Value Ref Range    Sodium 138 135 - 145 mmol/L    Potassium 3.8 3.6 - 5.5 mmol/L    Chloride 104 96 - 112 mmol/L    Co2 21 20 - 33 mmol/L    Glucose 148 (H) 65 - 99 mg/dL    Bun 24 (H) 8 - 22 mg/dL    Creatinine 0.89 0.50 - 1.40 mg/dL    Calcium 8.9 8.5 - 10.5 mg/dL    Anion Gap 13.0 (H)  0.0 - 11.9   ESTIMATED GFR   Result Value Ref Range    GFR If African American >60 >60 mL/min/1.73 m 2    GFR If Non African American >60 >60 mL/min/1.73 m 2   EKG (NOW)   Result Value Ref Range    Report       [facility] Emergency Dept.    Test Date:  2018  Pt Name:    HOLLY KIM                 Department: ER  MRN:        [id number]                      Room:       [facility]  Gender:     Male                         Technician: [id number]  :        1980                   Requested By:ER TRIAGE PROTOCOL  Order #:    [id number]                    Reading MD:    Measurements  Intervals                                Axis  Rate:       88                           P:          44  OR:         168                          QRS:        31  QRSD:       112                          T:          28  QT:         364  QTc:        441    Interpretive Statements  SINUS RHYTHM  CONSIDER LEFT ATRIAL ABNORMALITY  NONSPECIFIC INTRAVENTRICULAR CONDUCTION DELAY  Compared to ECG 2018 22:18:06  Sinus bradycardia no longer present       All labs were reviewed by me.    EKG  12 Lead EKG interpreted by me to show:  Sinus rhythm  Rate 88  Axis: Borderline left  Intervals: Normal  No acute ST-T wave changes  No concerning morphology  No change from prior EKG from 18    RADIOLOGY  DX-CERVICAL SPINE-2 OR 3 VIEWS   Final Result      No acute fracture or listhesis in the cervical spine.      DX-THORACOLUMBAR SPINE-2 VIEWS   Final Result      No acute fracture or listhesis in the thoracolumbar spine.        The radiologist's interpretation of all radiological studies and images have been reviewed by me.    COURSE & MEDICAL DECISION MAKING  Pertinent Labs & Imaging studies reviewed. (See chart for details)      3:20 AM - Patient seen and examined at bedside. Patient will be treated with Tylenol 650 mg. Ordered EKG, DX-Cervical Spine, BMP, CBC with differential, DX-Thoracolumbar spine to evaluate his symptoms.      Decision Making:  This is a 38 y.o. year old male who presents with syncope.  Patient states that he [place] after [place] karaoke with his friends.  Patient does have a history of seizures although he is unsure whether or not this event was a breakthrough seizure.  He says he does not feel like he has had a seizure.  No incontinence or oral trauma.  Possible dehydration and orthostasis.  Lab evaluation, EKG and x-ray imaging all negative.  Patient sleeping throughout a majority of his observation.  Doing significant better after Tylenol.  Will discharge home with outpatient follow-up with primary care physician.  The patient will return for new or worsening symptoms and is stable at the time of discharge.    The patient is referred to a primary physician for blood pressure management, diabetic screening, and for all other preventative health concerns.    DISPOSITION:  Patient will be discharged home in stable condition.    FOLLOW UP:  JOSE ChungRLyricN.  [address] [place] [id number]  [phone]            OUTPATIENT MEDICATIONS:  Discharge Medication List as of 8/5/2018  5:46 AM        FINAL IMPRESSION  1. Syncope, unspecified syncope type    2. H/O tonic-clonic seizures    3. Back strain, initial encounter    4. Accident due to mechanical fall without injury, initial encounter          Sudhakar ROB (Scribe), am scribing for, and in the presence of, Adonis Bustos M.D..    Electronically signed by: Sudhakar Alarcon (Scribe), 8/5/2018    Adonis ROB M.D. personally performed the services described in this documentation, as scribed by Sudhakar Alarcon in my presence, and it is both accurate and complete.    The note accurately reflects work and decisions made by me.  Adonis Bustos  8/5/2018  6:55 AM

## 2018-08-05 NOTE — ED TRIAGE NOTES
Norm Prabhakar  38 y.o.  male  Chief Complaint   Patient presents with   • Syncope     [place] by [employer] [place]. Per report patient had a syncopal episode. Patient was walking towards bus station when his vision was blurry. Per patient he passed out ~ 2 mins. Patient was at the [employer] prior incident happened. Now c/o neck and back pain. Hx sz. No incontinence , oral trauma noted. Denies alcohol intake while at the bar.

## 2018-08-06 ENCOUNTER — PATIENT OUTREACH (OUTPATIENT)
Dept: HEALTH INFORMATION MANAGEMENT | Facility: OTHER | Age: 38
End: 2018-08-06

## 2018-08-18 ENCOUNTER — HOSPITAL ENCOUNTER (OUTPATIENT)
Facility: MEDICAL CENTER | Age: 38
End: 2018-08-21
Attending: EMERGENCY MEDICINE | Admitting: HOSPITALIST
Payer: MEDICAID

## 2018-08-18 DIAGNOSIS — R53.1 RIGHT SIDED WEAKNESS: ICD-10-CM

## 2018-08-18 DIAGNOSIS — R53.1 WEAKNESS: ICD-10-CM

## 2018-08-18 DIAGNOSIS — R55 NEAR SYNCOPE: ICD-10-CM

## 2018-08-18 LAB — GLUCOSE BLD-MCNC: 100 MG/DL (ref 65–99)

## 2018-08-18 PROCEDURE — 82962 GLUCOSE BLOOD TEST: CPT

## 2018-08-18 PROCEDURE — 99285 EMERGENCY DEPT VISIT HI MDM: CPT

## 2018-08-19 ENCOUNTER — PATIENT OUTREACH (OUTPATIENT)
Dept: HEALTH INFORMATION MANAGEMENT | Facility: OTHER | Age: 38
End: 2018-08-19

## 2018-08-19 PROBLEM — L50.9 URTICARIA OF ENTIRE BODY: Status: ACTIVE | Noted: 2018-08-19

## 2018-08-19 PROBLEM — D72.829 LEUKOCYTOSIS: Status: ACTIVE | Noted: 2018-08-19

## 2018-08-19 PROBLEM — I95.9 HYPOTENSION: Status: ACTIVE | Noted: 2018-08-19

## 2018-08-19 PROBLEM — E78.5 HLD (HYPERLIPIDEMIA): Status: ACTIVE | Noted: 2018-08-19

## 2018-08-19 LAB
ALBUMIN SERPL BCP-MCNC: 4 G/DL (ref 3.2–4.9)
ALBUMIN/GLOB SERPL: 1.7 G/DL
ALP SERPL-CCNC: 52 U/L (ref 30–99)
ALT SERPL-CCNC: 17 U/L (ref 2–50)
ANION GAP SERPL CALC-SCNC: 13 MMOL/L (ref 0–11.9)
APPEARANCE UR: CLEAR
AST SERPL-CCNC: 18 U/L (ref 12–45)
BASOPHILS # BLD AUTO: 0.7 % (ref 0–1.8)
BASOPHILS # BLD: 0.08 K/UL (ref 0–0.12)
BILIRUB SERPL-MCNC: 0.4 MG/DL (ref 0.1–1.5)
BUN SERPL-MCNC: 16 MG/DL (ref 8–22)
CALCIUM SERPL-MCNC: 9 MG/DL (ref 8.5–10.5)
CHLORIDE SERPL-SCNC: 106 MMOL/L (ref 96–112)
CO2 SERPL-SCNC: 24 MMOL/L (ref 20–33)
COLOR UR AUTO: YELLOW
CREAT SERPL-MCNC: 1.02 MG/DL (ref 0.5–1.4)
EKG IMPRESSION: NORMAL
EOSINOPHIL # BLD AUTO: 0.3 K/UL (ref 0–0.51)
EOSINOPHIL NFR BLD: 2.7 % (ref 0–6.9)
ERYTHROCYTE [DISTWIDTH] IN BLOOD BY AUTOMATED COUNT: 45 FL (ref 35.9–50)
ETHANOL BLD-MCNC: 0.01 G/DL
GLOBULIN SER CALC-MCNC: 2.3 G/DL (ref 1.9–3.5)
GLUCOSE BLD-MCNC: 121 MG/DL (ref 65–99)
GLUCOSE BLD-MCNC: 185 MG/DL (ref 65–99)
GLUCOSE BLD-MCNC: 189 MG/DL (ref 65–99)
GLUCOSE BLD-MCNC: 192 MG/DL (ref 65–99)
GLUCOSE SERPL-MCNC: 99 MG/DL (ref 65–99)
GLUCOSE UR QL STRIP.AUTO: NEGATIVE MG/DL
HCT VFR BLD AUTO: 39.4 % (ref 42–52)
HGB BLD-MCNC: 13.2 G/DL (ref 14–18)
IMM GRANULOCYTES # BLD AUTO: 0.09 K/UL (ref 0–0.11)
IMM GRANULOCYTES NFR BLD AUTO: 0.8 % (ref 0–0.9)
KETONES UR QL STRIP.AUTO: 15 MG/DL
LEUKOCYTE ESTERASE UR QL STRIP.AUTO: NEGATIVE
LYMPHOCYTES # BLD AUTO: 3.98 K/UL (ref 1–4.8)
LYMPHOCYTES NFR BLD: 35.8 % (ref 22–41)
MCH RBC QN AUTO: 31.6 PG (ref 27–33)
MCHC RBC AUTO-ENTMCNC: 33.5 G/DL (ref 33.7–35.3)
MCV RBC AUTO: 94.3 FL (ref 81.4–97.8)
MONOCYTES # BLD AUTO: 1.05 K/UL (ref 0–0.85)
MONOCYTES NFR BLD AUTO: 9.5 % (ref 0–13.4)
NEUTROPHILS # BLD AUTO: 5.61 K/UL (ref 1.82–7.42)
NEUTROPHILS NFR BLD: 50.5 % (ref 44–72)
NITRITE UR QL STRIP.AUTO: NEGATIVE
NRBC # BLD AUTO: 0 K/UL
NRBC BLD-RTO: 0 /100 WBC
PH UR STRIP.AUTO: 6 [PH]
PLATELET # BLD AUTO: 245 K/UL (ref 164–446)
PMV BLD AUTO: 9.7 FL (ref 9–12.9)
POTASSIUM SERPL-SCNC: 3.8 MMOL/L (ref 3.6–5.5)
PROT SERPL-MCNC: 6.3 G/DL (ref 6–8.2)
PROT UR QL STRIP: 30 MG/DL
RBC # BLD AUTO: 4.18 M/UL (ref 4.7–6.1)
RBC UR QL AUTO: NEGATIVE
SODIUM SERPL-SCNC: 143 MMOL/L (ref 135–145)
SP GR UR: >=1.03
WBC # BLD AUTO: 11.1 K/UL (ref 4.8–10.8)

## 2018-08-19 PROCEDURE — 700105 HCHG RX REV CODE 258: Performed by: EMERGENCY MEDICINE

## 2018-08-19 PROCEDURE — 93005 ELECTROCARDIOGRAM TRACING: CPT | Performed by: EMERGENCY MEDICINE

## 2018-08-19 PROCEDURE — G8978 MOBILITY CURRENT STATUS: HCPCS | Mod: CM

## 2018-08-19 PROCEDURE — 80053 COMPREHEN METABOLIC PANEL: CPT

## 2018-08-19 PROCEDURE — 80307 DRUG TEST PRSMV CHEM ANLYZR: CPT

## 2018-08-19 PROCEDURE — 96374 THER/PROPH/DIAG INJ IV PUSH: CPT

## 2018-08-19 PROCEDURE — 82962 GLUCOSE BLOOD TEST: CPT

## 2018-08-19 PROCEDURE — G8979 MOBILITY GOAL STATUS: HCPCS | Mod: CL

## 2018-08-19 PROCEDURE — 96361 HYDRATE IV INFUSION ADD-ON: CPT

## 2018-08-19 PROCEDURE — 99220 PR INITIAL OBSERVATION CARE,LEVL III: CPT | Performed by: HOSPITALIST

## 2018-08-19 PROCEDURE — 700105 HCHG RX REV CODE 258: Performed by: HOSPITALIST

## 2018-08-19 PROCEDURE — G0378 HOSPITAL OBSERVATION PER HR: HCPCS

## 2018-08-19 PROCEDURE — 36415 COLL VENOUS BLD VENIPUNCTURE: CPT

## 2018-08-19 PROCEDURE — 85025 COMPLETE CBC W/AUTO DIFF WBC: CPT

## 2018-08-19 PROCEDURE — 700111 HCHG RX REV CODE 636 W/ 250 OVERRIDE (IP): Performed by: HOSPITALIST

## 2018-08-19 PROCEDURE — 81002 URINALYSIS NONAUTO W/O SCOPE: CPT

## 2018-08-19 PROCEDURE — 700102 HCHG RX REV CODE 250 W/ 637 OVERRIDE(OP): Performed by: INTERNAL MEDICINE

## 2018-08-19 PROCEDURE — A9270 NON-COVERED ITEM OR SERVICE: HCPCS | Performed by: HOSPITALIST

## 2018-08-19 PROCEDURE — 97162 PT EVAL MOD COMPLEX 30 MIN: CPT

## 2018-08-19 PROCEDURE — A9270 NON-COVERED ITEM OR SERVICE: HCPCS | Performed by: INTERNAL MEDICINE

## 2018-08-19 PROCEDURE — 700105 HCHG RX REV CODE 258: Performed by: INTERNAL MEDICINE

## 2018-08-19 PROCEDURE — 96376 TX/PRO/DX INJ SAME DRUG ADON: CPT

## 2018-08-19 PROCEDURE — 700111 HCHG RX REV CODE 636 W/ 250 OVERRIDE (IP): Performed by: EMERGENCY MEDICINE

## 2018-08-19 PROCEDURE — 306589 SLEEVE,VASO CALF LARGE: Performed by: HOSPITALIST

## 2018-08-19 PROCEDURE — 700102 HCHG RX REV CODE 250 W/ 637 OVERRIDE(OP): Performed by: HOSPITALIST

## 2018-08-19 RX ORDER — BUSPIRONE HYDROCHLORIDE 10 MG/1
10 TABLET ORAL 3 TIMES DAILY
Status: DISCONTINUED | OUTPATIENT
Start: 2018-08-19 | End: 2018-08-21 | Stop reason: HOSPADM

## 2018-08-19 RX ORDER — MIRTAZAPINE 15 MG/1
15-30 TABLET, FILM COATED ORAL NIGHTLY
Status: DISCONTINUED | OUTPATIENT
Start: 2018-08-19 | End: 2018-08-21 | Stop reason: HOSPADM

## 2018-08-19 RX ORDER — ACETAMINOPHEN 325 MG/1
650 TABLET ORAL EVERY 6 HOURS PRN
Status: DISCONTINUED | OUTPATIENT
Start: 2018-08-19 | End: 2018-08-21 | Stop reason: HOSPADM

## 2018-08-19 RX ORDER — SODIUM CHLORIDE 9 MG/ML
1000 INJECTION, SOLUTION INTRAVENOUS ONCE
Status: COMPLETED | OUTPATIENT
Start: 2018-08-19 | End: 2018-08-19

## 2018-08-19 RX ORDER — LEVOTHYROXINE SODIUM 0.12 MG/1
125 TABLET ORAL
Status: DISCONTINUED | OUTPATIENT
Start: 2018-08-19 | End: 2018-08-21 | Stop reason: HOSPADM

## 2018-08-19 RX ORDER — FAMOTIDINE 20 MG/1
20 TABLET, FILM COATED ORAL 2 TIMES DAILY
Status: DISCONTINUED | OUTPATIENT
Start: 2018-08-19 | End: 2018-08-21 | Stop reason: HOSPADM

## 2018-08-19 RX ORDER — DIVALPROEX SODIUM 500 MG/1
1500 TABLET, DELAYED RELEASE ORAL EVERY EVENING
Status: DISCONTINUED | OUTPATIENT
Start: 2018-08-19 | End: 2018-08-21 | Stop reason: HOSPADM

## 2018-08-19 RX ORDER — PREDNISONE 1 MG/1
1 TABLET ORAL DAILY
Status: DISCONTINUED | OUTPATIENT
Start: 2018-08-19 | End: 2018-08-19

## 2018-08-19 RX ORDER — GLIMEPIRIDE 4 MG/1
4 TABLET ORAL EVERY MORNING
Status: DISCONTINUED | OUTPATIENT
Start: 2018-08-19 | End: 2018-08-21 | Stop reason: HOSPADM

## 2018-08-19 RX ORDER — DIVALPROEX SODIUM 500 MG/1
500 TABLET, DELAYED RELEASE ORAL EVERY MORNING
Status: DISCONTINUED | OUTPATIENT
Start: 2018-08-19 | End: 2018-08-21 | Stop reason: HOSPADM

## 2018-08-19 RX ORDER — ATORVASTATIN CALCIUM 80 MG/1
80 TABLET, FILM COATED ORAL NIGHTLY
Status: DISCONTINUED | OUTPATIENT
Start: 2018-08-19 | End: 2018-08-21 | Stop reason: HOSPADM

## 2018-08-19 RX ORDER — SODIUM CHLORIDE, SODIUM LACTATE, POTASSIUM CHLORIDE, CALCIUM CHLORIDE 600; 310; 30; 20 MG/100ML; MG/100ML; MG/100ML; MG/100ML
INJECTION, SOLUTION INTRAVENOUS CONTINUOUS
Status: ACTIVE | OUTPATIENT
Start: 2018-08-19 | End: 2018-08-19

## 2018-08-19 RX ORDER — PREDNISONE 20 MG/1
50 TABLET ORAL DAILY
Status: DISCONTINUED | OUTPATIENT
Start: 2018-08-19 | End: 2018-08-21 | Stop reason: HOSPADM

## 2018-08-19 RX ORDER — POLYETHYLENE GLYCOL 3350 17 G/17G
1 POWDER, FOR SOLUTION ORAL
Status: DISCONTINUED | OUTPATIENT
Start: 2018-08-19 | End: 2018-08-21 | Stop reason: HOSPADM

## 2018-08-19 RX ORDER — ALBUTEROL SULFATE 90 UG/1
2 AEROSOL, METERED RESPIRATORY (INHALATION) EVERY 4 HOURS PRN
Status: DISCONTINUED | OUTPATIENT
Start: 2018-08-19 | End: 2018-08-21 | Stop reason: HOSPADM

## 2018-08-19 RX ORDER — DIPHENHYDRAMINE HYDROCHLORIDE 50 MG/ML
25 INJECTION INTRAMUSCULAR; INTRAVENOUS EVERY 6 HOURS PRN
Status: DISCONTINUED | OUTPATIENT
Start: 2018-08-19 | End: 2018-08-21 | Stop reason: HOSPADM

## 2018-08-19 RX ORDER — PREDNISONE 20 MG/1
60 TABLET ORAL DAILY
Status: DISCONTINUED | OUTPATIENT
Start: 2018-08-19 | End: 2018-08-19

## 2018-08-19 RX ORDER — DIVALPROEX SODIUM 500 MG/1
500-1500 TABLET, DELAYED RELEASE ORAL 2 TIMES DAILY
Status: DISCONTINUED | OUTPATIENT
Start: 2018-08-19 | End: 2018-08-19

## 2018-08-19 RX ORDER — AMOXICILLIN 250 MG
2 CAPSULE ORAL 2 TIMES DAILY
Status: DISCONTINUED | OUTPATIENT
Start: 2018-08-19 | End: 2018-08-21 | Stop reason: HOSPADM

## 2018-08-19 RX ORDER — PREDNISONE 20 MG/1
60 TABLET ORAL ONCE
Status: COMPLETED | OUTPATIENT
Start: 2018-08-19 | End: 2018-08-19

## 2018-08-19 RX ORDER — LORATADINE 10 MG/1
10 TABLET ORAL DAILY
Status: DISCONTINUED | OUTPATIENT
Start: 2018-08-19 | End: 2018-08-21 | Stop reason: HOSPADM

## 2018-08-19 RX ORDER — BISACODYL 10 MG
10 SUPPOSITORY, RECTAL RECTAL
Status: DISCONTINUED | OUTPATIENT
Start: 2018-08-19 | End: 2018-08-21 | Stop reason: HOSPADM

## 2018-08-19 RX ORDER — DIPHENHYDRAMINE HYDROCHLORIDE 50 MG/ML
25 INJECTION INTRAMUSCULAR; INTRAVENOUS ONCE
Status: COMPLETED | OUTPATIENT
Start: 2018-08-19 | End: 2018-08-19

## 2018-08-19 RX ADMIN — BUSPIRONE HYDROCHLORIDE 10 MG: 10 TABLET ORAL at 13:38

## 2018-08-19 RX ADMIN — PREDNISONE 50 MG: 1 TABLET ORAL at 06:32

## 2018-08-19 RX ADMIN — DIVALPROEX SODIUM 1500 MG: 500 TABLET, DELAYED RELEASE ORAL at 18:27

## 2018-08-19 RX ADMIN — ACETAMINOPHEN 650 MG: 325 TABLET, FILM COATED ORAL at 18:34

## 2018-08-19 RX ADMIN — METFORMIN HYDROCHLORIDE 1000 MG: 500 TABLET ORAL at 18:28

## 2018-08-19 RX ADMIN — PREDNISONE 60 MG: 20 TABLET ORAL at 00:38

## 2018-08-19 RX ADMIN — BUSPIRONE HYDROCHLORIDE 10 MG: 10 TABLET ORAL at 18:27

## 2018-08-19 RX ADMIN — DIPHENHYDRAMINE HYDROCHLORIDE 25 MG: 50 INJECTION, SOLUTION INTRAMUSCULAR; INTRAVENOUS at 00:44

## 2018-08-19 RX ADMIN — MIRTAZAPINE 30 MG: 15 TABLET, FILM COATED ORAL at 21:53

## 2018-08-19 RX ADMIN — FAMOTIDINE 20 MG: 20 TABLET ORAL at 08:54

## 2018-08-19 RX ADMIN — LEVOTHYROXINE SODIUM 125 MCG: 125 TABLET ORAL at 06:30

## 2018-08-19 RX ADMIN — GLIMEPIRIDE 4 MG: 4 TABLET ORAL at 08:56

## 2018-08-19 RX ADMIN — SODIUM CHLORIDE, POTASSIUM CHLORIDE, SODIUM LACTATE AND CALCIUM CHLORIDE: 600; 310; 30; 20 INJECTION, SOLUTION INTRAVENOUS at 08:54

## 2018-08-19 RX ADMIN — LORATADINE 10 MG: 10 TABLET ORAL at 09:44

## 2018-08-19 RX ADMIN — ATORVASTATIN CALCIUM 80 MG: 80 TABLET, FILM COATED ORAL at 21:53

## 2018-08-19 RX ADMIN — BUSPIRONE HYDROCHLORIDE 10 MG: 10 TABLET ORAL at 06:31

## 2018-08-19 RX ADMIN — SODIUM CHLORIDE 1000 ML: 9 INJECTION, SOLUTION INTRAVENOUS at 04:34

## 2018-08-19 RX ADMIN — FAMOTIDINE 20 MG: 20 TABLET ORAL at 18:27

## 2018-08-19 RX ADMIN — METFORMIN HYDROCHLORIDE 1000 MG: 500 TABLET ORAL at 08:56

## 2018-08-19 RX ADMIN — SODIUM CHLORIDE 1000 ML: 9 INJECTION, SOLUTION INTRAVENOUS at 00:45

## 2018-08-19 RX ADMIN — DIVALPROEX SODIUM 500 MG: 500 TABLET, DELAYED RELEASE ORAL at 06:30

## 2018-08-19 RX ADMIN — DIPHENHYDRAMINE HYDROCHLORIDE 25 MG: 50 INJECTION, SOLUTION INTRAMUSCULAR; INTRAVENOUS at 20:10

## 2018-08-19 ASSESSMENT — ENCOUNTER SYMPTOMS
FOCAL WEAKNESS: 0
MYALGIAS: 0
HEADACHES: 0
WHEEZING: 0
DIZZINESS: 0
PHOTOPHOBIA: 0
COUGH: 0
SHORTNESS OF BREATH: 0
WEAKNESS: 1
FEVER: 0
PALPITATIONS: 0
DEPRESSION: 0
CHILLS: 0
SORE THROAT: 0
NAUSEA: 0
ABDOMINAL PAIN: 0
TINGLING: 0
VOMITING: 0
DIARRHEA: 0

## 2018-08-19 ASSESSMENT — COPD QUESTIONNAIRES
COPD SCREENING SCORE: 0
COPD SCREENING SCORE: 4
HAVE YOU SMOKED AT LEAST 100 CIGARETTES IN YOUR ENTIRE LIFE: NO/DON'T KNOW
DO YOU EVER COUGH UP ANY MUCUS OR PHLEGM?: NO/ONLY WITH OCCASIONAL COLDS OR INFECTIONS
DO YOU EVER COUGH UP ANY MUCUS OR PHLEGM?: NO/ONLY WITH OCCASIONAL COLDS OR INFECTIONS
HAVE YOU SMOKED AT LEAST 100 CIGARETTES IN YOUR ENTIRE LIFE: YES
DURING THE PAST 4 WEEKS HOW MUCH DID YOU FEEL SHORT OF BREATH: SOME OF THE TIME
IN THE PAST 12 MONTHS DO YOU DO LESS THAN YOU USED TO BECAUSE OF YOUR BREATHING PROBLEMS: DISAGREE/UNSURE
DURING THE PAST 4 WEEKS HOW MUCH DID YOU FEEL SHORT OF BREATH: NONE/LITTLE OF THE TIME

## 2018-08-19 ASSESSMENT — LIFESTYLE VARIABLES
EVER_SMOKED: YES
DO YOU DRINK ALCOHOL: NO
ALCOHOL_USE: NO
EVER_SMOKED: NEVER

## 2018-08-19 ASSESSMENT — COGNITIVE AND FUNCTIONAL STATUS - GENERAL
CLIMB 3 TO 5 STEPS WITH RAILING: TOTAL
MOVING FROM LYING ON BACK TO SITTING ON SIDE OF FLAT BED: UNABLE
SUGGESTED CMS G CODE MODIFIER MOBILITY: CN
MOBILITY SCORE: 6
STANDING UP FROM CHAIR USING ARMS: TOTAL
TURNING FROM BACK TO SIDE WHILE IN FLAT BAD: UNABLE
MOVING TO AND FROM BED TO CHAIR: UNABLE
WALKING IN HOSPITAL ROOM: TOTAL

## 2018-08-19 ASSESSMENT — PATIENT HEALTH QUESTIONNAIRE - PHQ9
2. FEELING DOWN, DEPRESSED, IRRITABLE, OR HOPELESS: NOT AT ALL
1. LITTLE INTEREST OR PLEASURE IN DOING THINGS: NOT AT ALL
SUM OF ALL RESPONSES TO PHQ9 QUESTIONS 1 AND 2: 0

## 2018-08-19 ASSESSMENT — PAIN SCALES - GENERAL
PAINLEVEL_OUTOF10: 0
PAINLEVEL_OUTOF10: 0
PAINLEVEL_OUTOF10: 7
PAINLEVEL_OUTOF10: 0
PAINLEVEL_OUTOF10: 0

## 2018-08-19 ASSESSMENT — GAIT ASSESSMENTS: GAIT LEVEL OF ASSIST: UNABLE TO PARTICIPATE

## 2018-08-19 NOTE — THERAPY
"Physical Therapy Evaluation completed.   Bed Mobility:  Supine to Sit: Minimal Assist  Transfers: Sit to Stand: Unable to Participate  Gait: Level Of Assist: Unable to Participate with Front-Wheel Walker       Plan of Care: Will benefit from Physical Therapy 3 times per week  Discharge Recommendations: Equipment: Front-Wheel Walker.     See \"Rehab Therapy-Acute\" Patient Summary Report for complete documentation.   Mr. Prabhakar demonstrates significant loss of motor control compared to baseline. Apparently this has happened in the past with grand mal seziures. He was able to feed himself with his left hand but had difficulty following verbal commands for similar elbow and shoulder flexion. He demonstrated improved lower extremity strength in gravity eliminated and deomnstrated better use of limbs with functional movements rather than verbal commands. He is unable to perform bed mobility, transfers, gait due to his weakness. He would benefit from skilled physical therapy while in house to help regain active range of motion and strength to help return him to baseline function. He is likely to need post acute rehab unless he has significant increase in strength in the near future.   "

## 2018-08-19 NOTE — PROGRESS NOTES
"2 RN skin check:    Pt has generalized rashes but more so on abdomen, left arm, all over back, and bilat thigh. Rashes are flat, pink, and no oozing noted. \"Been there for weeks now\". States occasional itching but denies it at this time. Has scabbed over abrasion to right LE. Calloused heel bilat. Trace to 1+ nonpitting edema t/o. Buttocks pink, nonblanchable.       "

## 2018-08-19 NOTE — DISCHARGE PLANNING
Aware of PMR referral from MARIA DE JESUS Walsh. Admitted for hypotension, uticaria of entire body, leukocytosis. Hx seizure disorder - last seizure 3 days ago. Would appreciate initial OT eval to assist with determination for post acute needs. No Physiatry consult ordered per protocol at this time. TCC will follow.

## 2018-08-19 NOTE — H&P
" Hospital Medicine History and Physical    Date of Service  8/19/2018    Chief Complaint  Chief Complaint   Patient presents with   • Weakness     \"I was out with a few of my friends and started feeling really flushed in the face, like I was going to pass out.\" States continues to feel weak and shaky.    • Rash     To L arm. Reddened, raised. Reports he finished abx/steroids for it a few days ago and now is coming back.        History of Presenting Illness  38 y.o. male who presented on 8/18/2018 with weakness and worsening rash.  Patient with a history of developmental delay and lives in a group home.  He had apparently been out with friends today and drinking alcohol at a [employer] [place] when he was noted by his friends to become flushed, diaphoretic, and had a near syncopal episode.  Patient reports that he recently developed urticaria and has been started on antibiotics and prednisone by physician with resolution of his symptoms.  However, his symptoms returned today.  The patient is a rather poor historian, I suspect this is secondary to his  history of developmental delay.  He is unable to tell me if there has been any environmental changes in his group home, he is unsure there have been new carpets, pains, soaps, detergents, or other exposures.  Other than his recent antibiotics and prednisone, he has had no new prescription medications.  EMS had apparently been called and when they arrived, he reportedly was hypotensive.  He was brought in for further evaluation.  Patient otherwise denies any somatic complaints.        Primary Care Physician  ANIKET Chung    Consultants  None    Code Status  Full    Review of Systems  Review of Systems   Constitutional: Negative for chills and fever.   HENT: Negative for congestion and sore throat.    Eyes: Negative for photophobia.   Respiratory: Negative for cough, shortness of breath and wheezing.    Cardiovascular: Negative for chest pain and palpitations. "   Gastrointestinal: Negative for abdominal pain, diarrhea, nausea and vomiting.   Genitourinary: Negative for dysuria.   Musculoskeletal: Negative for myalgias.   Skin: Positive for rash.   Neurological: Positive for weakness. Negative for dizziness, tingling, focal weakness and headaches.   Psychiatric/Behavioral: Negative for depression and suicidal ideas.        Past Medical History  Past Medical History:   Diagnosis Date   • Seizure ([facility]) 2010   • Psychiatric problem 2002    PTSD   • Anxiety     BIPOLAR   • ASTHMA    • Bipolar 1 disorder ([facility])    • Depression    • Fall     passed out 2 wks ago   • Glaucoma    • Glaucoma 1982    both eyes/ blind on left eye   • Hypothyroidism    • Indigestion     once in a while   • Mental disorder     learning disabilities; speech impairment; developmental delays   • Murmur     since birth   • Pneumonia     remote   • S/P thyroidectomy    • Seizure disorder ([facility])    • Unspecified disorder of thyroid        Surgical History  Past Surgical History:   Procedure Laterality Date   • EYE SURGERY     • OTHER      Hernia Repair when he was 10 yrs old   • THYROID LOBECTOMY         Medications  No current facility-administered medications on file prior to encounter.      Current Outpatient Prescriptions on File Prior to Encounter   Medication Sig Dispense Refill   • busPIRone (BUSPAR) 10 MG Tab Take 10 mg by mouth 3 times a day.     • prazosin (MINIPRESS) 1 MG Cap Take 1 mg by mouth every evening.     • mirtazapine (REMERON) 30 MG Tab tablet Take 15-30 mg by mouth every evening.     • hydrOXYzine pamoate (VISTARIL) 25 MG Cap Take 25 mg by mouth 3 times a day as needed for Anxiety.     • glimepiride (AMARYL) 4 MG Tab Take 4 mg by mouth every morning.     • atorvastatin (LIPITOR) 80 MG tablet Take 80 mg by mouth every evening.     • Cholecalciferol ([employer] D3) 2000 UNIT Cap Take 4,000 Units by mouth every bedtime.     • metFORMIN (GLUCOPHAGE) 1000 MG tablet Take 1 Tab by mouth 2 times a day,  "with meals. 60 Tab 2   • divalproex (DEPAKOTE) 500 MG Tablet Delayed Response Take 500-1,500 mg by mouth 2 Times a Day. 500 mg AM  1500 mg PM     • levothyroxine (SYNTHROID) 125 MCG Tab Take 1 Tab by mouth Every morning on an empty stomach. 30 Tab 0       Family History  Family History   Problem Relation Age of Onset   • Hypertension Mother    • Heart Disease Mother    • Lung Disease Mother    • Stroke Maternal Grandmother        Social History  Social History   Substance Use Topics   • Smoking status: Former Smoker     Packs/day: 0.25     Types: Cigarettes, Cigars     Quit date: 2018   • Smokeless tobacco: Never Used   • Alcohol use No       Allergies  Allergies   Allergen Reactions   • Abilify Unspecified     \"Feeling tired, like I don't even know whats going on around me\"   • Fish      Pt reports fish causes him to be sick to his stomach          Physical Exam  Laboratory   Hemodynamics  Temp (24hrs), Av.1 °C (96.9 °F), Min:36.1 °C (96.9 °F), Max:36.1 °C (96.9 °F)   Temperature: 36.1 °C (96.9 °F)  Pulse  Av.5  Min: 82  Max: 99 Heart Rate (Monitored): 85  Blood Pressure: 123/81, NIBP: 113/77      Respiratory      Respiration: 18, Pulse Oximetry: 97 %             Physical Exam   Constitutional: He is oriented to person, place, and time. No distress.   Obese   HENT:   Head: Normocephalic and atraumatic.   Right Ear: External ear normal.   Left Ear: External ear normal.   Eyes: EOM are normal. Right eye exhibits no discharge. Left eye exhibits no discharge.   Neck: Neck supple. No JVD present.   Cardiovascular: Normal rate, regular rhythm and normal heart sounds.    Pulmonary/Chest: Effort normal and breath sounds normal. No respiratory distress. He exhibits no tenderness.   Abdominal: Soft. Bowel sounds are normal. He exhibits no distension. There is no tenderness.   Musculoskeletal: He exhibits no edema.   Neurological: He is alert and oriented to person, place, and time. No cranial nerve deficit. "   Weakness on the right   Skin: Skin is warm and dry. He is not diaphoretic. No erythema.   Multiple urticarial wheals extending over the abdomen, upper back, lower back, upper thighs, and bilateral arms.   Psychiatric: He has a normal mood and affect. His behavior is normal.   Nursing note and vitals reviewed.    Capillary refill less than 3 seconds, distal pulses intact    Recent Labs      08/19/18   0046   WBC  11.1*   RBC  4.18*   HEMOGLOBIN  13.2*   HEMATOCRIT  39.4*   MCV  94.3   MCH  31.6   MCHC  33.5*   RDW  45.0   PLATELETCT  245   MPV  9.7     Recent Labs      08/19/18   0046   SODIUM  143   POTASSIUM  3.8   CHLORIDE  106   CO2  24   GLUCOSE  99   BUN  16   CREATININE  1.02   CALCIUM  9.0     Recent Labs      08/19/18   0046   ALTSGPT  17   ASTSGOT  18   ALKPHOSPHAT  52   TBILIRUBIN  0.4   GLUCOSE  99                 Lab Results   Component Value Date    TROPONINI <0.01 07/27/2018       Imaging  Ct-abdomen-pelvis With    Result Date: 7/27/2018 7/27/2018 2:27 PM HISTORY/REASON FOR EXAM:  Right lower quadrant pain with hematochezia. TECHNIQUE/EXAM DESCRIPTION: CT scan of the abdomen and pelvis with contrast. Contrast-enhanced helical scanning was obtained from the diaphragmatic domes through the pubic symphysis following the bolus administration of 100 mL of nonionic contrast without complication. Low dose optimization technique was utilized for this CT exam including automated exposure control and adjustment of the mA and/or kV according to patient size. COMPARISON: 4/10/2018. FINDINGS: The visualized lung bases are clear. The liver is enlarged measuring 25 cm. There is diffuse low-attenuation of the liver consistent with hepatic steatosis and/or diffuse hepatocellular disease. The enhanced liver is otherwise unremarkable. The enhanced gallbladder is unremarkable. There is  fatty replacement of the pancreas. The spleen is enlarged measuring 15.2 cm. There is a small fat-containing umbilical hernia. There  is no free intra-abdominal air or fluid. A normal diameter gas-filled appendix is visualized. The large and small bowel are unremarkable. The urinary bladder is unremarkable. There is no free pelvic fluid. Multiple enlarged bilateral inguinal lymph nodes are again noted. There are no suspicious osseous lesions.     1.  Hepatosplenomegaly. 2.  Hepatic steatosis and/or diffuse hepatocellular disease. 3.  Small fat-containing umbilical hernia. 4.  Multiple enlarged bilateral lymph nodes. This is again a nonspecific finding which could be seen in setting of infection, inflammation or neoplasm.    Dx-cervical Spine-2 Or 3 Views    Result Date: 8/5/2018 8/5/2018 3:39 AM HISTORY/REASON FOR EXAM:  Pain Following Trauma. TECHNIQUE/EXAM DESCRIPTION AND NUMBER OF VIEWS: Cervical spine series, 4 views. COMPARISON:  None. FINDINGS: Preserved cervical lordosis. No acute fracture or listhesis. No significant spondylosis. The intervertebral disc spaces are well preserved. No soft tissue abnormality is identified.     No acute fracture or listhesis in the cervical spine.    Dx-thoracolumbar Spine-2 Views    Result Date: 8/5/2018 8/5/2018 3:40 AM HISTORY/REASON FOR EXAM:  Pain Following Trauma. TECHNIQUE/EXAM DESCRIPTION AND NUMBER OF VIEWS: Thoracolumbar spine, 2 views. COMPARISON:  None. FINDINGS: The alignment is normal. There is no evidence of fracture or listhesis. Mild thoracolumbar spondylosis.     No acute fracture or listhesis in the thoracolumbar spine.     Assessment/Plan     Anticipate that patient will need less than 2 midnights for management of the discussed medical issues.    * Hypotension   Assessment & Plan    This is by report from EMS, this has already resolved and has not reoccurred since his admission to the emergency room.  I am holding his home blood pressure medications for tonight and giving him gentle hydration.  Will continue to monitor blood pressures and resume antihypertensives once they have  stabilized.  Etiology is not clear but I suspect vasodilation from his type I sensitivity reaction given his whole body urticarial wheals noted on exam.        Urticaria of entire body   Assessment & Plan    Patient is a rather poor historian, he cannot tell me if there has been any environmental changes in his group home although he does deny any new prescription medications.  He states that he completed a course of antibiotics and steroids with resolution of his wheels but that they have since returned.  I will start him on steroids and Benadryl for symptomatic management.  He is currently protecting his airway.  He would benefit from an outpatient follow-up with allergist.        HLD (hyperlipidemia)   Assessment & Plan    This is chronic and stable, continue home Lipitor.        Type 2 diabetes mellitus without complication, without long-term current use of insulin (HCC)- (present on admission)   Assessment & Plan    This is chronic and stable, continue home metformin and Amaryl.        Seizure (HCC)- (present on admission)   Assessment & Plan    Patient has not had any recent seizures, continue home Depakote, monitor with seizure precautions.        Depression- (present on admission)   Assessment & Plan    This is chronic and stable, continue home Remeron and BuSpar.        Acquired hypothyroidism- (present on admission)   Assessment & Plan    This is chronic and stable, continue home Synthroid.          Prophylaxis: Sequential compression devices for DVT prophylaxis, no PPI indicated, bowel protocol as needed

## 2018-08-19 NOTE — ED TRIAGE NOTES
"Chief Complaint   Patient presents with   • Weakness     \"I was out with a few of my friends and started feeling really flushed in the face, like I was going to pass out.\" States continues to feel weak and shaky.    • Rash     To L arm. Reddened, raised. Reports he finished abx/steroids for it a few days ago and now is coming back.      /81   Pulse 99   Temp 36.1 °C (96.9 °F)   Resp 16   Ht 1.981 m (6' 6\")   Wt (!) 134 kg (295 lb 6.7 oz)   SpO2 95%   BMI 34.14 kg/m²     Pt ambulatory to triage for above. Pt is diabetic, FS in triage 100. Pt reports this is low for him and typically where he feels symptomatic and needs to eat/drink something. Pt given juice, will re-check sugar.   "

## 2018-08-19 NOTE — PROGRESS NOTES
Patient admitted after midnight:  38-year-old male who presented with weakness and worsening rash. Patient states he was at the [facility] with some friends singing karaoke, and drinking Diet Coke, when his friend stated that he did not look very well and he began feeling weak, with near syncopal episode. Patient does state he has a rash that started on his left arm and has moved throughout body that he was previously treated with steroids and antibiotics and had gone away temporarily. Patient is a very poor historian and is unable to answer whether or not he has been exposed to any new environmental allergens, or changes recently. Patient provided gentle IV hydration as he was supposedly hypotensive on scene with EMS. Patient's vital signs overnight remained in the 90s systolic, but have improved this a.m.. Patient has remained afebrile. Patient to be continued on Benadryl and steroids for his urticaria, that appears to be improving at this time. Continue with home medications with the exception of antihypertensives. PT evaluated patient and recommending acute rehab as he is performing below his baseline at this time. OT evaluation pending. We'll continue to monitor overnight and provide IV fluid hydration and place rehab consultation.

## 2018-08-19 NOTE — ED PROVIDER NOTES
"ED Provider Note    CHIEF COMPLAINT  Chief Complaint   Patient presents with   • Weakness     \"I was out with a few of my friends and started feeling really flushed in the face, like I was going to pass out.\" States continues to feel weak and shaky.    • Rash     To L arm. Reddened, raised. Reports he finished abx/steroids for it a few days ago and now is coming back.        HPI  Norm Prabhakar is a 38 y.o. male who presents with generalized weakness. The patient states he was downtown singing karaoke restarted to feel flushed and felt like he was given a pass out. The patient did not have any associated chest pain, palpitations, no difficulty breathing. He did not have any focal weakness. The patient appeared pale according to bystanders and EMS was called. The patient does have a diffuse rash that has had for some time. He states that it's pruritic in nature. He states his primary care doctor put him on steroids and an antibiotic that seemed to help but after has been off the antibiotic and steroids the rash has returned. Otherwise he states he's had no change in his cosmetics nor medication.    REVIEW OF SYSTEMS  See HPI for further details. All other systems are negative.     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   • Hypothyroidism    • Indigestion     once in a while   • Mental disorder     learning disabilities; speech impairment; developmental delays   • Murmur     since birth   • Pneumonia     remote   • Psychiatric problem 2002    PTSD   • S/P thyroidectomy    • Seizure (HCC) 2010   • Seizure disorder (HCC)    • Unspecified disorder of thyroid        SOCIAL HISTORY  Social History     Social History   • Marital status: Single     Spouse name: N/A   • Number of children: N/A   • Years of education: N/A " "    Social History Main Topics   • Smoking status: Former Smoker     Packs/day: 0.25     Types: Cigarettes, Cigars     Quit date: 5/1/2018   • Smokeless tobacco: Never Used   • Alcohol use No   • Drug use: Yes     Types: Inhaled      Comment: marijuana   • Sexual activity: Not on file     Other Topics Concern   • Not on file     Social History Narrative   • No narrative on file           PHYSICAL EXAM  VITAL SIGNS: /81   Pulse 99   Temp 36.1 °C (96.9 °F)   Resp 16   Ht 1.981 m (6' 6\")   Wt (!) 134 kg (295 lb 6.7 oz)   SpO2 95%   BMI 34.14 kg/m²   Constitutional: in acute distress, Non-toxic appearance.   HENT: Normocephalic, Atraumatic, tympanic membranes are intact and nonerythematous bilaterally, Oropharynx dry without exudates or erythema, Nose normal.   Eyes: PERRLA, EOMI, Conjunctiva normal.  Neck: Supple without meningismus  Lymphatic: No lymphadenopathy noted.   Cardiovascular: Tachycardic heart rate, Normal rhythm, No murmurs, No rubs, No gallops.   Thorax & Lungs: Normal breath sounds, No respiratory distress, No wheezing, No chest tenderness.   Abdomen: Bowel sounds normal, Soft, No tenderness, no rebound, no guarding, no distention, No masses, No pulsatile masses.   Skin: Diffuse urticarial rash  Back: No tenderness, No CVA tenderness.   Extremities: Atraumatic with symmetric distal pulses, No edema, No tenderness, No cyanosis, No clubbing.   Neurologic: Alert & oriented x 3, cranial nerves II through XII are intact, Normal motor function, Normal sensory function, No focal deficits noted.   Psychiatric: Affect normal, Judgment normal, Mood normal.     EKG  12-lead EKG interpreted by myself shows a normal sinus rhythm with a ventricular rate 87, normal QRS, normal intervals, no ST segment elevation or depression, T-wave inverted in lead 3 overall no dynamic changes from prior    COURSE & MEDICAL DECISION MAKING  Pertinent Labs & Imaging studies reviewed. (See chart for details)  This a " 38-year-old male who presents after a near syncopal episode. According to the technician they did visualize a short bout of supraventricular tachycardia but was not captured on the monitor. The patient's EKG does not show any arrhythmic changes and the patient has been in a sinus rhythm or a sinus tachycardia throughout his stay in the emergency department. On arrival he had the urticarial rash and appeared to be dry with dry mucous membranes and therefore he received a liter of fluid intravenously. On repeat examination does not feel any better despite intravenous fluids. The metabolic panel does not show any significant metabolic derangement that could cause cardiac irritability and his glucose is appropriate. Furthermore there is no acidosis. I don't have a clear source for the patient's generalized weakness and near syncopal episode prior to arrival. A cardiac arrhythmic source and still be in the differential. This could be from dehydration as the patient does have the rash that could cause vasodilatation and exacerbate his dehydration causing his blood pressure to drop and his presenting symptoms. I suspect if this was the source the patient would feel better with intravenous hydration. From an infectious standpoint I do not see any evidence of an infection. The rash appears to be allergic mediated and the patient did receive prednisone intravenously as well as Benadryl. I will have a clear source for the patient's near syncope and he does not feel better on repeat examination therefore that the patient for observation and further hydration. They will he can also follow the rash. The patient does not have any evidence of oral or pulmonary involvement therefore do not suspect he is having a systemic reaction.    FINAL IMPRESSION  1. Near syncope  2. Diffuse rash  3. Generalized weakness     Disposition  The patient will be admitted in stable condition    Electronically signed by: Josh Randhawa, 8/19/2018  12:25 AM

## 2018-08-19 NOTE — CARE PLAN
Problem: Safety  Goal: Will remain free from injury  Outcome: PROGRESSING SLOWER THAN EXPECTED  Generalized weakness. Needs 3-4 person assist with turning. Unable to walk.     Problem: Pain Management  Goal: Pain level will decrease to patient's comfort goal  Outcome: PROGRESSING AS EXPECTED  Denies pain. Resting and calm.     Problem: Infection  Goal: Will remain free from infection  Outcome: PROGRESSING AS EXPECTED  On RA. Respirations equal and unlabored.     Problem: Respiratory:  Goal: Respiratory status will improve  Outcome: PROGRESSING AS EXPECTED

## 2018-08-19 NOTE — ASSESSMENT & PLAN NOTE
Steroids and Benadryl for symptomatic management.   He would benefit from an outpatient follow-up with allergist.
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
## 2018-08-19 NOTE — PROGRESS NOTES
Patient reports he feels shaky and might need something to eat due to low blood sugar. FSBS 192, sugar free snacks provided. New orders for accu checks in place.

## 2018-08-19 NOTE — PROGRESS NOTES
"Assessment completed. Pt A&Ox4. Slow speech noted, baseline. Respirations are even and unlabored on RA. Pt denies pain at this time. Generalized urticaria, bruising and knee abrasions noted. Patient reports feeling \"really tired\". BLE and BUE weakness, push/pull with minimal effort, 1/5. Monitors applied, VS stable, call light and belongings within reach. POC updated. Pt educated on room and call light, pt verbalized understanding. Communication board updated. Needs met.  "

## 2018-08-19 NOTE — ED NOTES
Report given to May RN on CDU. Pt taken to CDU by assist RN. NAD. Belongings and chart with patient.

## 2018-08-19 NOTE — ASSESSMENT & PLAN NOTE
Patient has not had any recent seizures, continue home Depakote, monitor with seizure precautions.  EEG pending- If positive will need Neurology consult for medication adjustments

## 2018-08-20 ENCOUNTER — PATIENT OUTREACH (OUTPATIENT)
Dept: HEALTH INFORMATION MANAGEMENT | Facility: OTHER | Age: 38
End: 2018-08-20

## 2018-08-20 LAB
ANION GAP SERPL CALC-SCNC: 11 MMOL/L (ref 0–11.9)
BUN SERPL-MCNC: 12 MG/DL (ref 8–22)
CALCIUM SERPL-MCNC: 8.6 MG/DL (ref 8.5–10.5)
CHLORIDE SERPL-SCNC: 106 MMOL/L (ref 96–112)
CO2 SERPL-SCNC: 22 MMOL/L (ref 20–33)
CREAT SERPL-MCNC: 0.75 MG/DL (ref 0.5–1.4)
ERYTHROCYTE [DISTWIDTH] IN BLOOD BY AUTOMATED COUNT: 44.4 FL (ref 35.9–50)
GLUCOSE BLD-MCNC: 137 MG/DL (ref 65–99)
GLUCOSE BLD-MCNC: 168 MG/DL (ref 65–99)
GLUCOSE SERPL-MCNC: 162 MG/DL (ref 65–99)
HCT VFR BLD AUTO: 37.1 % (ref 42–52)
HGB BLD-MCNC: 12.4 G/DL (ref 14–18)
MCH RBC QN AUTO: 31.7 PG (ref 27–33)
MCHC RBC AUTO-ENTMCNC: 33.4 G/DL (ref 33.7–35.3)
MCV RBC AUTO: 94.9 FL (ref 81.4–97.8)
PLATELET # BLD AUTO: 244 K/UL (ref 164–446)
PMV BLD AUTO: 9.9 FL (ref 9–12.9)
POTASSIUM SERPL-SCNC: 3.9 MMOL/L (ref 3.6–5.5)
RBC # BLD AUTO: 3.91 M/UL (ref 4.7–6.1)
SODIUM SERPL-SCNC: 139 MMOL/L (ref 135–145)
WBC # BLD AUTO: 14.2 K/UL (ref 4.8–10.8)

## 2018-08-20 PROCEDURE — 99226 PR SUBSEQUENT OBSERVATION CARE,LEVEL III: CPT | Performed by: INTERNAL MEDICINE

## 2018-08-20 PROCEDURE — 82962 GLUCOSE BLOOD TEST: CPT | Mod: 91

## 2018-08-20 PROCEDURE — 95816 EEG AWAKE AND DROWSY: CPT

## 2018-08-20 PROCEDURE — 97165 OT EVAL LOW COMPLEX 30 MIN: CPT

## 2018-08-20 PROCEDURE — 80048 BASIC METABOLIC PNL TOTAL CA: CPT

## 2018-08-20 PROCEDURE — 700102 HCHG RX REV CODE 250 W/ 637 OVERRIDE(OP): Performed by: INTERNAL MEDICINE

## 2018-08-20 PROCEDURE — G8988 SELF CARE GOAL STATUS: HCPCS | Mod: CI

## 2018-08-20 PROCEDURE — G8989 SELF CARE D/C STATUS: HCPCS | Mod: CI

## 2018-08-20 PROCEDURE — 85027 COMPLETE CBC AUTOMATED: CPT

## 2018-08-20 PROCEDURE — 700111 HCHG RX REV CODE 636 W/ 250 OVERRIDE (IP): Performed by: HOSPITALIST

## 2018-08-20 PROCEDURE — A9270 NON-COVERED ITEM OR SERVICE: HCPCS | Performed by: INTERNAL MEDICINE

## 2018-08-20 PROCEDURE — 700102 HCHG RX REV CODE 250 W/ 637 OVERRIDE(OP): Performed by: HOSPITALIST

## 2018-08-20 PROCEDURE — G8987 SELF CARE CURRENT STATUS: HCPCS | Mod: CI

## 2018-08-20 PROCEDURE — G0378 HOSPITAL OBSERVATION PER HR: HCPCS

## 2018-08-20 PROCEDURE — 86038 ANTINUCLEAR ANTIBODIES: CPT

## 2018-08-20 PROCEDURE — A9270 NON-COVERED ITEM OR SERVICE: HCPCS | Performed by: HOSPITALIST

## 2018-08-20 RX ADMIN — MIRTAZAPINE 30 MG: 15 TABLET, FILM COATED ORAL at 21:09

## 2018-08-20 RX ADMIN — BUSPIRONE HYDROCHLORIDE 10 MG: 10 TABLET ORAL at 18:37

## 2018-08-20 RX ADMIN — FAMOTIDINE 20 MG: 20 TABLET ORAL at 07:00

## 2018-08-20 RX ADMIN — LORATADINE 10 MG: 10 TABLET ORAL at 06:59

## 2018-08-20 RX ADMIN — BUSPIRONE HYDROCHLORIDE 10 MG: 10 TABLET ORAL at 07:00

## 2018-08-20 RX ADMIN — METFORMIN HYDROCHLORIDE 1000 MG: 500 TABLET ORAL at 18:37

## 2018-08-20 RX ADMIN — ACETAMINOPHEN 650 MG: 325 TABLET, FILM COATED ORAL at 08:57

## 2018-08-20 RX ADMIN — FAMOTIDINE 20 MG: 20 TABLET ORAL at 18:36

## 2018-08-20 RX ADMIN — DIVALPROEX SODIUM 1500 MG: 500 TABLET, DELAYED RELEASE ORAL at 18:36

## 2018-08-20 RX ADMIN — BUSPIRONE HYDROCHLORIDE 10 MG: 10 TABLET ORAL at 13:11

## 2018-08-20 RX ADMIN — DIVALPROEX SODIUM 500 MG: 500 TABLET, DELAYED RELEASE ORAL at 06:58

## 2018-08-20 RX ADMIN — ATORVASTATIN CALCIUM 80 MG: 80 TABLET, FILM COATED ORAL at 21:09

## 2018-08-20 RX ADMIN — GLIMEPIRIDE 4 MG: 4 TABLET ORAL at 07:00

## 2018-08-20 RX ADMIN — PREDNISONE 50 MG: 1 TABLET ORAL at 06:59

## 2018-08-20 RX ADMIN — LEVOTHYROXINE SODIUM 125 MCG: 125 TABLET ORAL at 07:00

## 2018-08-20 RX ADMIN — METFORMIN HYDROCHLORIDE 1000 MG: 500 TABLET ORAL at 08:42

## 2018-08-20 ASSESSMENT — COGNITIVE AND FUNCTIONAL STATUS - GENERAL
DAILY ACTIVITIY SCORE: 23
DRESSING REGULAR LOWER BODY CLOTHING: A LITTLE
SUGGESTED CMS G CODE MODIFIER DAILY ACTIVITY: CI

## 2018-08-20 ASSESSMENT — ENCOUNTER SYMPTOMS
NAUSEA: 0
VOMITING: 0
DOUBLE VISION: 0
MYALGIAS: 0
COUGH: 0
ABDOMINAL PAIN: 0
SORE THROAT: 0
MEMORY LOSS: 1
FOCAL WEAKNESS: 1
FEVER: 0
DEPRESSION: 0
SHORTNESS OF BREATH: 0
PHOTOPHOBIA: 0
WEAKNESS: 1
NECK PAIN: 0
BLURRED VISION: 0
CHILLS: 0

## 2018-08-20 ASSESSMENT — PAIN SCALES - GENERAL
PAINLEVEL_OUTOF10: 0
PAINLEVEL_OUTOF10: 6

## 2018-08-20 ASSESSMENT — ACTIVITIES OF DAILY LIVING (ADL): TOILETING: INDEPENDENT

## 2018-08-20 NOTE — RESPIRATORY CARE
COPD EDUCATION by COPD CLINICAL EDUCATOR  8/20/2018 at 7:20 AM by Marilou Díaz     Patient reviewed by COPD education team. Patient does not qualify for COPD program.

## 2018-08-20 NOTE — DISCHARGE PLANNING
"Care Transition Team Assessment    Met with pt at bedside. According to pt he lives at group home, he said he is independent at baseline, medications are dispensed by Group home, denies using assistive devices. Pt said he will use [facility]TM at discharge.    Information Source  Orientation : Oriented x 4  Information Given By: Patient    Readmission Evaluation  Is this a readmission?: Yes - unplanned readmission  Why do you think you were readmitted?: \"flushed and unwell\"  Was an appointment arranged for you prior to discharge?: Yes, attended appointment  Were there new prescriptions you were supposed to fill after you were discharged?: Yes, prescriptions filled  Did you understand your discharge instructions?: Yes  Did you have enough support after your last discharge?: Yes    Interdisciplinary Discharge Planning  Does Admitting Nurse Feel This Could be a Complex Discharge?: No  Primary Care Physician: Lori PRETTY  Lives with - Patient's Self Care Capacity: Attendant / Paid Care Giver (Lives in Group Home)  Patient or legal guardian wants to designate a caregiver (see row info): No  Support Systems: Other (Comments) (Group Home)  Housing / Facility: [facility] ([facility]  Name of [facility] Facility: [facility]  Do You Take your Prescribed Medications Regularly: Yes  Able to Return to Previous ADL's: Yes  Mobility Issues: No  Prior Services: None  Patient Expects to be Discharged to:: Home - Group Home  Assistance Needed:  (Medications are dispensed at group home)  Durable Medical Equipment: Not Applicable    Discharge Preparedness  What are your discharge supports?: Other (comment) (Attendants at group home)  Prior Functional Level: Independent with Activities of Daily Living, Needs Assist with Medication Management, Ambulatory  Difficulity with ADLs: None  Difficulity with IADLs: Managing medication, Laundry, Cooking    Functional Assesment  Prior Functional Level: Independent with Activities of Daily Living, Needs " Assist with Medication Management, Ambulatory    Finances  Prescription Coverage: Yes      Discharge Risks or Barriers  Discharge risks or barriers?: No    Anticipated Discharge Information  Anticipated discharge disposition: Home  Dis[facility] Address: [facility]  Discharge Contact Phone Number: Patient [phone]

## 2018-08-20 NOTE — THERAPY
"Occupational Therapy Evaluation completed.   Functional Status:  Pt pleasant & co-operative.  Pt was SBA for supine to sit EOB.  Pt able to dress UE & LE in his own clothes with SBA seated EOB.  Pt transferred to bedside chair with SBA & FWW.  Pt reports he does live in a group home & has some support available if needed upon D/C.  Pt encouraged to be OOB during the day & amb with staff to bathroom.  Plan of Care: Patient with no further skilled OT needs in the acute care setting at this time  Discharge Recommendations:  Equipment: Shower Chair. Post-acute therapy Discharge to home with outpatient or home health for additional skilled therapy services.    See \"Rehab Therapy-Acute\" Patient Summary Report for complete documentation.    "

## 2018-08-20 NOTE — PROGRESS NOTES
Denies pain tonight. Has occasional itching due to rash earlier. Benadryl given with good result. Still weak but appears to be just a little stronger than yesterday. Able to turn side to side and was holding utensils and phone without any problem. Voids qs. Plan of EEG later today. Pt aware and verbalized understanding.

## 2018-08-20 NOTE — CARE PLAN
Problem: Pain Management  Goal: Pain level will decrease to patient's comfort goal  Denies pain med needs. Able to rest and sleep.     Problem: Infection  Goal: Will remain free from infection  Still having rash t/o body. Denies chills. Afebrile.

## 2018-08-21 VITALS
OXYGEN SATURATION: 94 % | RESPIRATION RATE: 20 BRPM | WEIGHT: 294.31 LBS | BODY MASS INDEX: 34.05 KG/M2 | SYSTOLIC BLOOD PRESSURE: 143 MMHG | HEART RATE: 65 BPM | TEMPERATURE: 97.5 F | HEIGHT: 78 IN | DIASTOLIC BLOOD PRESSURE: 77 MMHG

## 2018-08-21 PROBLEM — I95.9 HYPOTENSION: Status: RESOLVED | Noted: 2018-08-19 | Resolved: 2018-08-21

## 2018-08-21 LAB
GLUCOSE BLD-MCNC: 159 MG/DL (ref 65–99)
GLUCOSE BLD-MCNC: 165 MG/DL (ref 65–99)
NUCLEAR IGG SER QL IA: NORMAL

## 2018-08-21 PROCEDURE — A9270 NON-COVERED ITEM OR SERVICE: HCPCS | Performed by: HOSPITALIST

## 2018-08-21 PROCEDURE — 700111 HCHG RX REV CODE 636 W/ 250 OVERRIDE (IP): Performed by: HOSPITALIST

## 2018-08-21 PROCEDURE — 99217 PR OBSERVATION CARE DISCHARGE: CPT | Performed by: INTERNAL MEDICINE

## 2018-08-21 PROCEDURE — 82962 GLUCOSE BLOOD TEST: CPT

## 2018-08-21 PROCEDURE — 97530 THERAPEUTIC ACTIVITIES: CPT

## 2018-08-21 PROCEDURE — G0378 HOSPITAL OBSERVATION PER HR: HCPCS

## 2018-08-21 PROCEDURE — 700102 HCHG RX REV CODE 250 W/ 637 OVERRIDE(OP): Performed by: INTERNAL MEDICINE

## 2018-08-21 PROCEDURE — 96376 TX/PRO/DX INJ SAME DRUG ADON: CPT

## 2018-08-21 PROCEDURE — 700102 HCHG RX REV CODE 250 W/ 637 OVERRIDE(OP): Performed by: HOSPITALIST

## 2018-08-21 PROCEDURE — 97116 GAIT TRAINING THERAPY: CPT

## 2018-08-21 PROCEDURE — A9270 NON-COVERED ITEM OR SERVICE: HCPCS | Performed by: INTERNAL MEDICINE

## 2018-08-21 RX ORDER — EPINEPHRINE 0.3 MG/.3ML
0.3 INJECTION SUBCUTANEOUS PRN
Qty: 0.3 ML | Refills: 3 | Status: SHIPPED | OUTPATIENT
Start: 2018-08-21 | End: 2018-12-31

## 2018-08-21 RX ORDER — LEVETIRACETAM 500 MG/1
500 TABLET ORAL 2 TIMES DAILY
Qty: 60 TAB | Refills: 3 | Status: SHIPPED | OUTPATIENT
Start: 2018-08-21 | End: 2018-12-31

## 2018-08-21 RX ORDER — PREDNISONE 20 MG/1
TABLET ORAL
Qty: 8 TAB | Refills: 0 | Status: SHIPPED | OUTPATIENT
Start: 2018-08-22 | End: 2018-12-31

## 2018-08-21 RX ORDER — LORATADINE 10 MG/1
10 TABLET ORAL DAILY
Qty: 30 TAB | Refills: 1 | Status: SHIPPED | OUTPATIENT
Start: 2018-08-22 | End: 2018-12-31

## 2018-08-21 RX ADMIN — LEVOTHYROXINE SODIUM 125 MCG: 125 TABLET ORAL at 05:48

## 2018-08-21 RX ADMIN — FAMOTIDINE 20 MG: 20 TABLET ORAL at 05:47

## 2018-08-21 RX ADMIN — DIPHENHYDRAMINE HYDROCHLORIDE 25 MG: 50 INJECTION, SOLUTION INTRAMUSCULAR; INTRAVENOUS at 11:07

## 2018-08-21 RX ADMIN — DIVALPROEX SODIUM 500 MG: 500 TABLET, DELAYED RELEASE ORAL at 05:47

## 2018-08-21 RX ADMIN — BUSPIRONE HYDROCHLORIDE 10 MG: 10 TABLET ORAL at 13:55

## 2018-08-21 RX ADMIN — GLIMEPIRIDE 4 MG: 4 TABLET ORAL at 05:47

## 2018-08-21 RX ADMIN — LORATADINE 10 MG: 10 TABLET ORAL at 05:48

## 2018-08-21 RX ADMIN — BUSPIRONE HYDROCHLORIDE 10 MG: 10 TABLET ORAL at 05:47

## 2018-08-21 RX ADMIN — PREDNISONE 50 MG: 1 TABLET ORAL at 05:46

## 2018-08-21 RX ADMIN — METFORMIN HYDROCHLORIDE 1000 MG: 500 TABLET ORAL at 08:58

## 2018-08-21 ASSESSMENT — COGNITIVE AND FUNCTIONAL STATUS - GENERAL
MOBILITY SCORE: 18
SUGGESTED CMS G CODE MODIFIER MOBILITY: CK
STANDING UP FROM CHAIR USING ARMS: A LITTLE
TURNING FROM BACK TO SIDE WHILE IN FLAT BAD: A LITTLE
WALKING IN HOSPITAL ROOM: A LITTLE
CLIMB 3 TO 5 STEPS WITH RAILING: A LITTLE
MOVING FROM LYING ON BACK TO SITTING ON SIDE OF FLAT BED: A LITTLE
MOVING TO AND FROM BED TO CHAIR: A LITTLE

## 2018-08-21 ASSESSMENT — GAIT ASSESSMENTS
DISTANCE (FEET): 50
DEVIATION: BRADYKINETIC;SHUFFLED GAIT
ASSISTIVE DEVICE: FRONT WHEEL WALKER
GAIT LEVEL OF ASSIST: STAND BY ASSIST

## 2018-08-21 ASSESSMENT — PAIN SCALES - GENERAL: PAINLEVEL_OUTOF10: 3

## 2018-08-21 NOTE — DISCHARGE PLANNING
Received Choice form at 1115 from NIDIA Parks.   Agency/Facility Name: [facility]  Referral sent per Choice form @ [phone].

## 2018-08-21 NOTE — PROGRESS NOTES
[facility]ist Progress Note    Date of Service: 2018    Chief Complaint  38 y.o. male admitted 2018 with weakness after singing at [employer], accompanied with rash to left arm that has spread to most of body that was previously treated with antibiotics and steroid therapy and had resolved.      Interval Problem Update  - Patient's weakness has improved today and patient was able to get up to chair with physical therapy. Patient returned to baseline and lives  currently in a group home, discharge was just pending his EEG. Patient continues to have rash to forearm which is improving with Benadryl and steroids.     Consultants/Specialty  ? Neurology for seizure management if EEG is positive.     Disposition  Likely home tomorrow. Will need Neurology consult for SZ management if EEG positive         Review of Systems   Constitutional: Negative for chills and fever.   HENT: Negative for congestion and sore throat.    Eyes: Negative for blurred vision, double vision and photophobia.   Respiratory: Negative for cough and shortness of breath.    Cardiovascular: Negative for chest pain and leg swelling.   Gastrointestinal: Negative for abdominal pain, nausea and vomiting.   Genitourinary: Negative for dysuria, frequency and urgency.   Musculoskeletal: Negative for myalgias and neck pain.   Skin: Positive for itching and rash.   Neurological: Positive for focal weakness and weakness.   Endo/Heme/Allergies: Negative for environmental allergies.   Psychiatric/Behavioral: Positive for memory loss. Negative for depression.      Physical Exam  Laboratory/Imaging   Hemodynamics  Temp (24hrs), Av.6 °C (97.9 °F), Min:36.2 °C (97.1 °F), Max:37 °C (98.6 °F)   Temperature: 36.8 °C (98.3 °F)  Pulse  Av.6  Min: 51  Max: 99    Blood Pressure: 122/57      Respiratory      Respiration: 16, Pulse Oximetry: 90 %        RUL Breath Sounds: Clear, RML Breath Sounds: Clear, RLL Breath Sounds: Diminished, LEONOR Breath  Sounds: Clear, LLL Breath Sounds: Diminished    Fluids    Intake/Output Summary (Last 24 hours) at 08/20/18 1907  Last data filed at 08/20/18 0404   Gross per 24 hour   Intake              600 ml   Output             1500 ml   Net             -900 ml       Nutrition  Orders Placed This Encounter   Procedures   • Diet Order Diabetic     Standing Status:   Standing     Number of Occurrences:   1     Order Specific Question:   Diet:     Answer:   Diabetic [3]     Physical Exam   Constitutional: He is oriented to person, place, and time. He appears well-developed. He is cooperative. No distress.   Obese male, developmentally/cognitively delayed, sitting in bed in no distress playing on cell phone     HENT:   Head: Normocephalic.   Mouth/Throat: Oropharynx is clear and moist.   Eyes: Conjunctivae and lids are normal.   Neck: Neck supple. No tracheal tenderness present.   Cardiovascular: Normal rate, regular rhythm and normal heart sounds.  Exam reveals no gallop.    Pulmonary/Chest: Effort normal and breath sounds normal. No respiratory distress. He has no decreased breath sounds. He has no wheezes.   Abdominal: Normal appearance and bowel sounds are normal. He exhibits no distension. There is no tenderness.   Musculoskeletal:   Patient able to ambulate to chair with steady gait    Neurological: He is alert and oriented to person, place, and time. Gait normal.   Skin: Skin is warm and dry. Rash noted. Rash is urticarial. He is not diaphoretic.   Rash most severe to left arm    Psychiatric: His speech is delayed. He is slowed.   Flat affect    Nursing note and vitals reviewed.      Recent Labs      08/19/18   0046  08/20/18   0400   WBC  11.1*  14.2*   RBC  4.18*  3.91*   HEMOGLOBIN  13.2*  12.4*   HEMATOCRIT  39.4*  37.1*   MCV  94.3  94.9   MCH  31.6  31.7   MCHC  33.5*  33.4*   RDW  45.0  44.4   PLATELETCT  245  244   MPV  9.7  9.9     Recent Labs      08/19/18   0046  08/20/18   0400   SODIUM  143  139   POTASSIUM   3.8  3.9   CHLORIDE  106  106   CO2  24  22   GLUCOSE  99  162*   BUN  16  12   CREATININE  1.02  0.75   CALCIUM  9.0  8.6                      Assessment/Plan     * Hypotension- (present on admission)   Assessment & Plan    Resolved- Patient to resume home medications         Leukocytosis, stress-, and streoid-related- (present on admission)   Assessment & Plan    Patient on steroids for urticarial rash   Likely secondary to steroid use         Urticaria of entire body- (present on admission)   Assessment & Plan    Steroids and Benadryl for symptomatic management.   He would benefit from an outpatient follow-up with allergist.        HLD (hyperlipidemia)- (present on admission)   Assessment & Plan    This is chronic and stable, continue home Lipitor.        Type 2 diabetes mellitus without complication, without long-term current use of insulin (HCC)- (present on admission)   Assessment & Plan    This is chronic and stable, continue home metformin and Amaryl.        Seizure (HCC)- (present on admission)   Assessment & Plan    Patient has not had any recent seizures, continue home Depakote, monitor with seizure precautions.  EEG pending- If positive will need Neurology consult for medication adjustments         Class 1 obesity due to excess calories with serious comorbidity and body mass index (BMI) of 34.0 to 34.9 in adult- (present on admission)   Assessment & Plan    Patient needs dietary and lifestyle modifications on discharge  Follow up with PCP         Depression- (present on admission)   Assessment & Plan    This is chronic and stable, continue home Remeron and BuSpar.        Acquired hypothyroidism- (present on admission)   Assessment & Plan    This is chronic and stable, continue home Synthroid.          Quality-Core Measures   Reviewed items::  Labs reviewed and Medications reviewed  Contreras catheter::  No Contreras  DVT prophylaxis pharmacological::  Not indicated at this time, ambulatory  DVT prophylaxis -  mechanical:  SCDs  Ulcer Prophylaxis::  Yes  Assessed for rehabilitation services:  Patient was assess for and/or received rehabilitation services during this hospitalization

## 2018-08-21 NOTE — THERAPY
"Physical Therapy Treatment completed.   Bed Mobility:  Supine to Sit: Contact Guard Assist  Transfers: Sit to Stand: Contact Guard Assist  Gait: Level Of Assist: Stand by Assist with Front-Wheel Walker       Plan of Care: Will benefit from Physical Therapy 1 more treatment session prior to DC and Plan to complete next treatment by Friday 8/24  Discharge Recommendations: Equipment: No Equipment Needed. Post-acute therapy Currently anticipate no further skilled therapy needs once patient is discharged from the inpatient setting.    Pt seen today for skilled PT treatment as pt is set to DC today, however, was not yet able to ambulate when last seen by PT. Per OT noted from yesterday, pt performed all mobility tasks at SBA to Mod I level. Today pt required extra time and effort to complete all mobility tasks at CGA to SBA level. Pt seems to be greatly exaggerating effort required to perform mobility tasks. Did attempt to perform UE and LE manual muscle testing at EOB. Pt unable to lift UE or LE's against gravity while seated, however, with functional tasks such as drinking from a cup or use of phone, no issues with bringing cup to mouth or texting. Pt was also seen actively moving B LE's in bed, flexing knees and hips against gravity. Pt required CGA for sit to stand then ambulated in hallway with FWW with SBA. Pt with heavy reliance on use of UE's to support self with FWW. Pt with inconsistent presentation from day to day and among different care providers. Pt does report he would have assistance from staff at group home. If pt remains in the acute care setting, will check on him 1 more time, however, pt is functionally capable of DC home     See \"Rehab Therapy-Acute\" Patient Summary Report for complete documentation.       "

## 2018-08-21 NOTE — DISCHARGE PLANNING
ATTN: Case Management  RE: Referral for Home Health    Reason for referral denial: Per clinicians there is no Home Health needs.                We would like to take this opportunity to thank you for submitting a referral for your patient to continue the journey to recovery with [facility]. We hope to facilitate continued referrals from your facility as our goal is to provide quality, skilled care to as many patients as possible.            Unfortunately, we are not able to accept your patient into our service for the reason listed above. If further clarity is needed, our Intake Coordinators are available to discuss any barriers to service.            If you have any questions or concerns regarding this or future patients’ transition to Home Health, please do not hesitate to contact us. We are open for referrals 7 days a week from 8AM to 5PM at [phone].      We look forward to collaborating with you in the future,  [facility] Team

## 2018-08-21 NOTE — FACE TO FACE
Face to Face Supporting Documentation - Home Health    The encounter with this patient was in whole or in part the primary reason for home health admission.    Date of encounter:   Patient:                    MRN:                       YOB: 2016  Norm Prabhakar  [id number]  1980     Home health to see patient for:  Physical Therapy evaluation and treatment and Occupational therapy evaluation and treatment    Skilled need for:  Recent Deterioration of Health Status Generalized weakness    Skilled nursing interventions to include:  Comment: Assistance with activities of daily living.    Homebound status evidenced by:  Need the aid of supportive devices such as crutches, canes, wheelchairs or walkers. Leaving home requires a considerable and taxing effort. There is a normal inability to leave the home.    Community Physician to provide follow up care: ANIKET Chung     Optional Interventions? No      I certify the face to face encounter for this home health care referral meets the CMS requirements and the encounter/clinical assessment with the patient was, in whole, or in part, for the medical condition(s) listed above, which is the primary reason for home health care. Based on my clinical findings: the service(s) are medically necessary, support the need for home health care, and the homebound criteria are met.  I certify that this patient has had a face to face encounter by myself.  DANY Nieto. - NPI: [id number]

## 2018-08-21 NOTE — DISCHARGE PLANNING
Pt has discharge orders. Provided pt with choice of Home Health services - signed choice form faxed to [facility]. Pt provided with contact number for his choice of [facility]. Pt has already called [facility] to set up transport.

## 2018-08-21 NOTE — PROCEDURES
DATE OF SERVICE:  08/20/2018    This is an inpatient EEG, was done on 08/20/2018.    ROUTINE VIDEO ELECTROENCEPHALOGRAM REPORT        NAME: Norm Brandon Prabhakar     REFERRING Dr: DR. BALDERRAMA     DURATION: 27 minutes     INDICATION:        Weakness                      TECHNIQUE: 30 channel routine electroencephalogram (EEG) was performed in accordance with the international 10-20 system. The study was reviewed in bipolar and referential montages. The recording examined the patient during wakeful and drowsy state(s).      DESCRIPTION OF THE RECORD:        Background rhythm during awake stage shows well-organized, well-developed, average voltage 7.5 to 8.5 hertz alpha activity in the posterior regions.  It blocks with eye opening and it is attenuated over left.  There are epileptiform spike-and-wave discharges over posterior temporal and occipital ( L>R)  or lateralizing abnormalities over posterior as well ( R>L) are seen.  Photic stimulation did not produce any abnormalities. Hyperventilation did not produce any abnormalities. Spells of left arm trembling without scalp EEG change. Stage I sleep was achieved.           ACTIVATION PROCEDURES:       Hyperventilation and Photic Stimulation were done     ICTAL AND/OR INTERICTAL FINDINGS:    There are epileptiform spike-and-wave discharges over posterior temporal and occipital ( L>R)  or lateralizing abnormalities over posterior as well ( R>L) are seen.  No clinical events or seizures were reported or recorded during the study.       EKG: sampling of the EKG recording demonstrated sinus rhythm.          INTERPRETATION:     ________________________________________________________________________      This is abnormal routine EEG recording in the awake and drowsy/sleep state(s).     This scalp EEG is consistent with                  1. Focal dysfunction/ irritability over bilateral posterior temporal and occipital ( L>R) ( this denotes focal seizure)    ________________________________________________________________________                             ____________________________________     MD STELLA PETERS    DD:  08/20/2018 18:36:15  DT:  08/20/2018 20:24:37    D#:  [id number]  Job#:  [id number]

## 2018-08-21 NOTE — PROGRESS NOTES
Left message on voicemail with writer's contact information.     Awaiting call back.     Report received, pt care assumed.

## 2018-08-21 NOTE — DISCHARGE PLANNING
Follow up for  rehab chart review indicated no skilled OT need. Current documentation does not support therapy need for IRF level of care, No physiatry consult ordered per protocol.

## 2018-08-21 NOTE — DISCHARGE SUMMARY
"Discharge Summary    CHIEF COMPLAINT ON ADMISSION  Chief Complaint   Patient presents with   • Weakness     \"I was out with a few of my friends and started feeling really flushed in the face, like I was going to pass out.\" States continues to feel weak and shaky.    • Rash     To L arm. Reddened, raised. Reports he finished abx/steroids for it a few days ago and now is coming back.        Reason for Admission  Weakness     Admission Date  8/18/2018    CODE STATUS  Full Code    HPI & HOSPITAL COURSE  This is a 38 y.o. male with a past medical history of seizure disorder, developmental delay, bipolar 1 disorder here with generalized weakness and worsening rash.  The patient has been to the hospital multiple times this year for complaints of right sided weakness with negative workups.  Prior to this admission the patient had been out drinking in a bar with friends when the generalized weakness occurred.  He also reports being treated for urticaria and has noticed worsening of his skin rash.  On admission patient was found to be hypotensive and this resolved with IV fluid resuscitation.  EEG was performed showing focal dysfunction over bilateral posterior temporal and occipital lobes denoting focal seizure.  The patient has had multiple EEGs recently, all of which showing structural abnormality and cortical irritability.  The patient reports that he does follow with an outpatient neurologist at [facility] although secondary to his developmental delay he is unable to recall the name of this physician.  He says that the last time he saw this physician was last month.  The patient is currently only taking Depakote for seizures and reports he was taking Keppra but this was recently discontinued.  The patient is unable to recall who discontinued this medication.  He has not had any witnessed seizures on this hospital admission.  The patient does report having approximately 2-3 seizures a month at baseline. I did discuss " this case with Dr. Longo, who does recommend restarting the patient's Keppra dose due to the possibility of focal seizures on EEG.  The patient reports that he has an upcoming appointment with his outpatient neurologist and is recommended to keep this appointment.  Over the patient's hospital admission he was able to ambulate and is likely at his baseline functioning.  His increased weakness was likely secondary to alcohol use and dehydration which has resolved.  He was noted to have urticaria and was treated with antihistamines and a steroid regimen.  The patient's skin rash is likely secondary to some type of allergic reaction although the patient denies any history of skin allergies.  He is recommended to have a skin allergy test as an outpatient.  He is prescribed an EpiPen at this time for severe allergic reactions.  At this time the patient has no further need for acute intervention.       Therefore, he is discharged in good and stable condition to home with close outpatient follow-up.    Discharge Date  08/21/2018    FOLLOW UP ITEMS POST DISCHARGE  The patient will follow with his outpatient neurologist at [facility] for further recommendations concerning his seizure medications.    DISCHARGE DIAGNOSES  Principal Problem (Resolved):    Hypotension POA: Yes  Active Problems:    Urticaria of entire body POA: Yes    Leukocytosis, stress-, and streoid-related POA: Yes    Acquired hypothyroidism POA: Yes    Depression POA: Yes    Class 1 obesity due to excess calories with serious comorbidity and body mass index (BMI) of 34.0 to 34.9 in adult POA: Yes    Seizure (HCC) POA: Yes    Type 2 diabetes mellitus without complication, without long-term current use of insulin (HCC) POA: Yes    HLD (hyperlipidemia) POA: Yes      FOLLOW UP    JOSE ChungRLyricNLyric  [address] [address]  [phone]    Go on 9/13/2018  Please arrive at 12:45 pm for your appointment with Dr Cassidy . Thank you  "      MEDICATIONS ON DISCHARGE     Medication List      START taking these medications      Instructions   EPINEPHrine 0.3 MG/0.3ML Soaj solution for injection  Commonly known as:  EPIPEN 2-SONDRA   0.3 mL by Intramuscular route as needed (Allergic reaction).  Dose:  0.3 mg     levETIRAcetam 500 MG Tabs  Commonly known as:  KEPPRA   Take 1 Tab by mouth 2 times a day.  Dose:  500 mg     loratadine 10 MG Tabs  Commonly known as:  CLARITIN   Take 1 Tab by mouth every day.  Dose:  10 mg     predniSONE 20 MG Tabs  Commonly known as:  DELTASONE   Day 1-4:  Take 40 mg by mouth daily.        CONTINUE taking these medications      Instructions   atorvastatin 80 MG tablet  Commonly known as:  LIPITOR   Take 80 mg by mouth every evening.  Dose:  80 mg     busPIRone 10 MG Tabs  Commonly known as:  BUSPAR   Take 10 mg by mouth 3 times a day.  Dose:  10 mg     [employer] D3 2000 UNIT Caps  Generic drug:  Cholecalciferol   Take 4,000 Units by mouth every bedtime.  Dose:  4000 Units     divalproex 500 MG Tbec  Commonly known as:  DEPAKOTE   Take 500-1,500 mg by mouth 2 Times a Day. 500 mg AM 1500 mg PM  Dose:  500-1500 mg     glimepiride 4 MG Tabs  Commonly known as:  AMARYL   Take 4 mg by mouth every morning.  Dose:  4 mg     hydrOXYzine pamoate 25 MG Caps  Commonly known as:  VISTARIL   Take 25 mg by mouth 3 times a day as needed for Anxiety.  Dose:  25 mg     levothyroxine 125 MCG Tabs  Commonly known as:  SYNTHROID   Take 1 Tab by mouth Every morning on an empty stomach.  Dose:  125 mcg     metformin 1000 MG tablet  Commonly known as:  GLUCOPHAGE   Take 1 Tab by mouth 2 times a day, with meals.  Dose:  1000 mg     mirtazapine 30 MG Tabs tablet  Commonly known as:  REMERON   Take 15-30 mg by mouth every evening.  Dose:  15-30 mg     prazosin 1 MG Caps  Commonly known as:  MINIPRESS   Take 1 mg by mouth every evening.  Dose:  1 mg            Allergies  Allergies   Allergen Reactions   • Abilify Unspecified     \"Feeling tired, like I don't " "even know whats going on around me\"   • Fish      Pt reports fish causes him to be sick to his stomach         DIET  Orders Placed This Encounter   Procedures   • Diet Order Diabetic     Standing Status:   Standing     Number of Occurrences:   1     Order Specific Question:   Diet:     Answer:   Diabetic [3]       ACTIVITY  As tolerated.      LABORATORY  Lab Results   Component Value Date    SODIUM 139 08/20/2018    POTASSIUM 3.9 08/20/2018    CHLORIDE 106 08/20/2018    CO2 22 08/20/2018    GLUCOSE 162 (H) 08/20/2018    BUN 12 08/20/2018    CREATININE 0.75 08/20/2018        Lab Results   Component Value Date    WBC 14.2 (H) 08/20/2018    HEMOGLOBIN 12.4 (L) 08/20/2018    HEMATOCRIT 37.1 (L) 08/20/2018    PLATELETCT 244 08/20/2018        Total time of the discharge process was 32 minutes.  "

## 2018-08-21 NOTE — PROGRESS NOTES
Discharge instructions, medications and follow-up reviewed with pt, pt verbalized understanding and denies questions. Discharge paperwork given to pt. PIV removed, TeleBox removed, armband removed. MTM called and stated Marcell Smart and Cab are on their way to  pt.  Pt transported to [facility] via wheelchair with hospital escort.

## 2018-08-21 NOTE — DISCHARGE INSTRUCTIONS
Discharge Instructions    Discharged to home by car with relative. Discharged via wheelchair, hospital escort: Yes.  Special equipment needed: Not Applicable    Be sure to schedule a follow-up appointment with your primary care doctor or any specialists as instructed.     Discharge Plan:   Diet Plan: Discussed  Activity Level: Discussed  Confirmed Follow up Appointment: Patient to Call and Schedule Appointment  Confirmed Symptoms Management: Discussed  Medication Reconciliation Updated: Yes  Influenza Vaccine Indication: Not indicated: Previously immunized this influenza season and > 8 years of age    I understand that a diet low in cholesterol, fat, and sodium is recommended for good health. Unless I have been given specific instructions below for another diet, I accept this instruction as my diet prescription.   Other diet: Regular    Special Instructions: None    · Is patient discharged on Warfarin / Coumadin?   No     Depression / Suicide Risk    As you are discharged from this [facility] facility, it is important to learn how to keep safe from harming yourself.    Recognize the warning signs:  · Abrupt changes in personality, positive or negative- including increase in energy   · Giving away possessions  · Change in eating patterns- significant weight changes-  positive or negative  · Change in sleeping patterns- unable to sleep or sleeping all the time   · Unwillingness or inability to communicate  · Depression  · Unusual sadness, discouragement and loneliness  · Talk of wanting to die  · Neglect of personal appearance   · Rebelliousness- reckless behavior  · Withdrawal from people/activities they love  · Confusion- inability to concentrate     If you or a loved one observes any of these behaviors or has concerns about self-harm, here's what you can do:  · Talk about it- your feelings and reasons for harming yourself  · Remove any means that you might use to hurt yourself (examples: pills, rope, extension  cords, firearm)  · Get professional help from the community (Mental Health, Substance Abuse, psychological counseling)  · Do not be alone:Call your Safe Contact- someone whom you trust who will be there for you.  · Call your local CRISIS HOTLINE 398-6389 or 518-013-6485  · Call your local Children's Mobile Crisis Response Team [place] (164) 402-2217 or www.iPawn  · Call the toll free National Suicide Prevention Hotlines   · National Suicide Prevention Lifeline 959-032-YZPA (6223)  · National Hope Line Network 800-SUICIDE (900-0020)

## 2018-08-21 NOTE — EEG PROGRESS NOTE
"EEG 08/20/18 6:20 PM    ROUTINE VIDEO ELECTROENCEPHALOGRAM REPORT      NAME: Norm Prabhakar    REFERRING Dr: DR. BALDERRAMA    DURATION: 27 minutes    INDICATION:   Weakness        \"I was out with a few of my friends and started feeling really flushed in the face, like I was going to pass out.\" States continues to feel weak and shaky.           TECHNIQUE: 30 channel routine electroencephalogram (EEG) was performed in accordance with the international 10-20 system. The study was reviewed in bipolar and referential montages. The recording examined the patient during wakeful and drowsy state(s).     DESCRIPTION OF THE RECORD:      Background rhythm during awake stage shows well-organized, well-developed, average voltage 7.5 to 8.5 hertz alpha activity in the posterior regions.  It blocks with eye opening and it is attenuated over left.  There are epileptiform spike-and-wave discharges over posterior temporal and occipital ( L>R)  or lateralizing abnormalities over posterior as well ( R>L) are seen.  Photic stimulation did not produce any abnormalities. Hyperventilation did not produce any abnormalities. Spells of left arm trembling without scalp EEG change. Stage I sleep was achieved.        ACTIVATION PROCEDURES:      Hyperventilation and Photic Stimulation were done    ICTAL AND/OR INTERICTAL FINDINGS:    There are epileptiform spike-and-wave discharges over posterior temporal and occipital ( L>R)  or lateralizing abnormalities over posterior as well ( R>L) are seen.  No clinical events or seizures were reported or recorded during the study.      EKG: sampling of the EKG recording demonstrated sinus rhythm.        INTERPRETATION:    ________________________________________________________________________     This is abnormal routine EEG recording in the awake and drowsy/sleep state(s).    This scalp EEG is consistent with      1. Focal dysfunction/ irritability over bilateral posterior temporal and occipital ( L>R) ( " this denotes focal seizure)   ________________________________________________________________________

## 2018-08-21 NOTE — PROGRESS NOTES
Obtained pts baseline vitals, emptied pts urinal. Call light within reach, no other needs at this time.

## 2018-08-21 NOTE — PROGRESS NOTES
Pt denies pain, neuro exam intact. Speech clear but slow to respond at baseline. Updated on plan of care for overnight and call light.  Given snack per request. Call light within reach, no needs at this time.

## 2018-08-22 ENCOUNTER — PATIENT OUTREACH (OUTPATIENT)
Dept: HEALTH INFORMATION MANAGEMENT | Facility: OTHER | Age: 38
End: 2018-08-22

## 2018-08-23 ENCOUNTER — HOSPITAL ENCOUNTER (EMERGENCY)
Facility: MEDICAL CENTER | Age: 38
End: 2018-08-23
Attending: EMERGENCY MEDICINE
Payer: MEDICAID

## 2018-08-23 VITALS
RESPIRATION RATE: 16 BRPM | HEIGHT: 78 IN | DIASTOLIC BLOOD PRESSURE: 74 MMHG | BODY MASS INDEX: 34.15 KG/M2 | HEART RATE: 81 BPM | SYSTOLIC BLOOD PRESSURE: 121 MMHG | OXYGEN SATURATION: 94 % | TEMPERATURE: 98.4 F | WEIGHT: 295.2 LBS

## 2018-08-23 DIAGNOSIS — L25.9 CONTACT DERMATITIS, UNSPECIFIED CONTACT DERMATITIS TYPE, UNSPECIFIED TRIGGER: ICD-10-CM

## 2018-08-23 DIAGNOSIS — R21 RASH: ICD-10-CM

## 2018-08-23 PROCEDURE — 99283 EMERGENCY DEPT VISIT LOW MDM: CPT

## 2018-08-23 RX ORDER — CEPHALEXIN 500 MG/1
500 CAPSULE ORAL 2 TIMES DAILY
Qty: 20 CAP | Refills: 0 | Status: SHIPPED | OUTPATIENT
Start: 2018-08-23 | End: 2018-09-02

## 2018-08-23 ASSESSMENT — ENCOUNTER SYMPTOMS
FEVER: 0
CHILLS: 0
SORE THROAT: 0
HEADACHES: 0
ABDOMINAL PAIN: 0
SHORTNESS OF BREATH: 0
MYALGIAS: 0
NAUSEA: 0
VOMITING: 0
BRUISES/BLEEDS EASILY: 0
DIARRHEA: 0
WHEEZING: 0

## 2018-08-23 NOTE — ED TRIAGE NOTES
Pt comes in complaining of a rash to his left arm. Pt stating he has had the rash for a while but it was doing better last time he was in the hospital.

## 2018-08-24 NOTE — ED PROVIDER NOTES
CHIEF COMPLAINT  Chief Complaint   Patient presents with   • Rash       HPI  HPI  Norm Prabhakar is a 38 y.o. male with a past medical history of anxiety, bipolar disorder, hypothyroidism, and seizures presenting for evaluation of rash.  Patient reports that he has had a rash located primarily on his left arm bilateral axilla and occasionally in other places for many months.  He states that he was admitted recently and at that time the rash seemed to have improved.  Over the past week he has noticed the rash increasing especially on the left arm.  He denies any significant pain or itching of the rash.  Patient denies changing any of his soaps, detergents, or lotions.  Patient has not had any fevers, vomiting, diarrhea.  He has been putting an herbal medicine on the rash without effect.  Patient does admit that he currently uses Axe body products.  He has not yet seen a dermatologist for this.  He states that he is concerned that he may have an infection, as he feels like the rash is getting worse.      REVIEW OF SYSTEMS  Review of Systems   Constitutional: Negative for chills and fever.   HENT: Negative for congestion and sore throat.    Respiratory: Negative for shortness of breath and wheezing.    Cardiovascular: Negative for chest pain.   Gastrointestinal: Negative for abdominal pain, diarrhea, nausea and vomiting.   Musculoskeletal: Negative for myalgias.   Skin: Positive for rash.   Neurological: Negative for headaches.   Endo/Heme/Allergies: Does not bruise/bleed easily.       PAST MEDICAL HISTORY   has a past medical history of Anxiety; ASTHMA; Bipolar 1 disorder (HCC); Depression; Fall; Glaucoma; Glaucoma (1982); Hypothyroidism; Indigestion; Mental disorder; Murmur; Pneumonia; Psychiatric problem (2002); S/P thyroidectomy; Seizure (HCC) (2010); Seizure disorder ([facility]); and Unspecified disorder of thyroid.    SOCIAL HISTORY  Social History     Social History Main Topics   • Smoking status: Former Smoker      "Packs/day: 0.25     Types: Cigarettes, Cigars     Quit date: 5/1/2018   • Smokeless tobacco: Never Used   • Alcohol use No   • Drug use: Yes     Types: Inhaled      Comment: marijuana   • Sexual activity: Not on file       SURGICAL HISTORY   has a past surgical history that includes eye surgery; thyroid lobectomy; and other.    CURRENT MEDICATIONS  Home Medications    **Home medications have not yet been reviewed for this encounter**         ALLERGIES  Allergies   Allergen Reactions   • Abilify Unspecified     \"Feeling tired, like I don't even know whats going on around me\"   • Fish      Pt reports fish causes him to be sick to his stomach         PHYSICAL EXAM  VITAL SIGNS: /74   Pulse 81   Temp 36.9 °C (98.4 °F)   Resp 16   Ht 1.981 m (6' 6\")   Wt (!) 133.9 kg (295 lb 3.1 oz)   SpO2 94%   BMI 34.11 kg/m²   Pulse ox interpretation: I interpret this pulse ox as normal.    Physical Exam   Constitutional: He is oriented to person, place, and time and well-developed, well-nourished, and in no distress.   HENT:   Head: Normocephalic and atraumatic.   Mouth/Throat: Oropharynx is clear and moist.   Eyes: Pupils are equal, round, and reactive to light. Conjunctivae are normal.   Neck: Normal range of motion. Neck supple.   Cardiovascular: Normal rate, regular rhythm and intact distal pulses.    Pulmonary/Chest: Effort normal and breath sounds normal. No respiratory distress. He has no wheezes. He has no rales.   Abdominal: Soft. He exhibits no distension. There is no tenderness.   Musculoskeletal: Normal range of motion. He exhibits no edema or deformity.   Neurological: He is alert and oriented to person, place, and time. GCS score is 15.   Skin: Skin is warm and dry.   Bilateral axillae with excoriated superficial plaques with some apparent crusting at the edges of the lesion.  Left antecubital fossa with erythematous, blanching, irregular plaque.   Psychiatric: Affect and judgment normal.   Nursing note and " vitals reviewed.            COURSE & MEDICAL DECISION MAKING  Pertinent Labs & Imaging studies reviewed. (See chart for details)  38-year-old male presents for evaluation of rash which she has had waxing and waning for some time.  On my initial evaluation, the patient was well-appearing with normal vital signs.  Evaluation of the rash revealed an erythematous blanching area in the left antecubital fossa as well as excoriated areas in the bilateral axillae.  Patient's presentation was concerning for eczema, impetigo, erythema multiforme, urticaria, contact dermatitis    Given the patient had no significant systemic symptoms, felt that it is appropriate to treat for local infection, and refer him to dermatology as an outpatient.  Patient will be treated with Keflex for suspected impetigo as well as mupirocin.  I advised him on using mild soaps and lotions and avoiding all scents at this time given that he is likely having a reaction to one of these that is leading to this presentation.    The patient will return for new or worsening symptoms and is stable at the time of discharge.    The patient is referred to a primary physician for blood pressure management, diabetic screening, and for all other preventative health concerns.    DISPOSITION:  Patient will be discharged home in stable condition.    FOLLOW UP:  Lori Monroe, SATHYAP.R.N.  [address]  [facility] [address] [phone]    Schedule an appointment as soon as possible for a visit      [facility]  [facility] [id number] [phone]  Schedule an appointment as soon as possible for a visit        OUTPATIENT MEDICATIONS:  Discharge Medication List as of 8/23/2018  6:21 PM      START taking these medications    Details   cephALEXin (KEFLEX) 500 MG Cap Take 1 Cap by mouth 2 times a day for 10 days., Disp-20 Cap, R-0, Normal      hydrocortisone 2.5 % Cream topical cream Apply 1 Application to affected  area(s) 2 times a day., Disp-1 Tube, R-0, Normal             FINAL IMPRESSION  Visit Diagnoses     ICD-10-CM   1. Rash R21   2. Contact dermatitis, unspecified contact dermatitis type, unspecified trigger L25.9              Electronically signed by: Lulu Uriarte, 8/23/2018 6:13 PM

## 2018-08-24 NOTE — ED NOTES
Pt given discharge instructions/prescriptions sent to pharm of choosing/ home care instructions explained, pt verbalized understanding of instructions given, pt ambulatory to [facility].

## 2018-08-25 ENCOUNTER — HOSPITAL ENCOUNTER (EMERGENCY)
Facility: MEDICAL CENTER | Age: 38
End: 2018-08-25
Attending: EMERGENCY MEDICINE
Payer: MEDICAID

## 2018-08-25 VITALS
TEMPERATURE: 96.7 F | HEIGHT: 78 IN | SYSTOLIC BLOOD PRESSURE: 118 MMHG | WEIGHT: 296.3 LBS | OXYGEN SATURATION: 95 % | HEART RATE: 103 BPM | DIASTOLIC BLOOD PRESSURE: 82 MMHG | RESPIRATION RATE: 16 BRPM | BODY MASS INDEX: 34.28 KG/M2

## 2018-08-25 DIAGNOSIS — L30.9 DERMATITIS: ICD-10-CM

## 2018-08-25 PROCEDURE — 99283 EMERGENCY DEPT VISIT LOW MDM: CPT

## 2018-08-25 ASSESSMENT — ENCOUNTER SYMPTOMS: FEVER: 0

## 2018-08-25 ASSESSMENT — LIFESTYLE VARIABLES: DO YOU DRINK ALCOHOL: NO

## 2018-08-26 ENCOUNTER — PATIENT OUTREACH (OUTPATIENT)
Dept: HEALTH INFORMATION MANAGEMENT | Facility: OTHER | Age: 38
End: 2018-08-26

## 2018-08-26 NOTE — ED TRIAGE NOTES
Pt states he was seen in the ER 8/23/18 for a rash on his left arm and states that he went swimming today and now has a rash on bilateral legs, denies itching but says it hurts. Pt denies SOB, pt speaking in complete sentences.

## 2018-08-26 NOTE — ED NOTES
Verbalizes understanding of discharge and followup instructions. Given Rx. Ambulates with steady gait to discharge.

## 2018-08-26 NOTE — ED PROVIDER NOTES
ED Provider Note    Scribed for Sal Gan M.D. by Jovan Crane. 8/25/2018  10:49 PM    Means of arrival: Walk in  History obtained by: Patient  Limitations: None      CHIEF COMPLAINT  Chief Complaint   Patient presents with   • Rash       HPI  Norm Prabhakar is a 38 y.o. male who presents to the ED for evaluation of a rash. Patient has had a chronic rash on his left arm and leg. The rash flares up intermittently. Per nursing note, patient was in the ED on 8/23/18 for a rash on his left arm. On exam, he reports having a flare up of the rash after swimming at his friend's pool today. Patient notes his friend had just cleaned out the pool and put in new water and chlorine. He thinks there may have been too much chlorine added. The rash is diffuse but most prominent to his bilateral thighs. The rashes are mildly burning in sensation. The pain is exacerbated when walking, from chafing. He otherwise does not report any other associated symptoms. Negative recent fevers.     Per chart review, patient has been prescribed hydrocortisone cream for his rash which he reports works well for him. However, he states that he was unable to pick it up due to the prescription being sent to the wrong pharmacy during his last visit. Chart review showed multiple documentation of rash in prior notes.       REVIEW OF SYSTEMS  Review of Systems   Constitutional: Negative for fever.   Skin: Positive for rash (diffusely, most prominent to bilateral thighs, painful).   All other systems reviewed and are negative.  See HPI for further details.   C    PAST MEDICAL HISTORY   has a past medical history of Anxiety; ASTHMA; Bipolar 1 disorder (HCC); Depression; Fall; Glaucoma; Glaucoma (1982); Hypothyroidism; Indigestion; Mental disorder; Murmur; Pneumonia; Psychiatric problem (2002); S/P thyroidectomy; Seizure (HCC) (2010); Seizure disorder ([facility]); and Unspecified disorder of thyroid.    SOCIAL HISTORY  Social History     Social  "History Main Topics   • Smoking status: Former Smoker     Packs/day: 0.25     Types: Cigarettes, Cigars     Quit date: 5/1/2018   • Smokeless tobacco: Never Used   • Alcohol use No   • Drug use: Yes     Types: Inhaled      Comment: marijuana       SURGICAL HISTORY   has a past surgical history that includes eye surgery; thyroid lobectomy; and other.    CURRENT MEDICATIONS  Home Medications     Reviewed by Jocelynn Ross R.N. (Registered Nurse) on 08/25/18 at 2215  Med List Status: <None>   Medication Last Dose Status   atorvastatin (LIPITOR) 80 MG tablet 8/4/2018 Active   busPIRone (BUSPAR) 10 MG Tab 8/4/2018 Active   cephALEXin (KEFLEX) 500 MG Cap  Active   Cholecalciferol ([employer] D3) 2000 UNIT Cap 8/4/2018 Active   divalproex (DEPAKOTE) 500 MG Tablet Delayed Response 8/4/2018 Active   EPINEPHrine (EPIPEN 2-SONDRA) 0.3 MG/0.3ML Solution Auto-injector solution for injection  Active   glimepiride (AMARYL) 4 MG Tab 8/4/2018 Active   hydrocortisone 2.5 % Cream topical cream  Active   hydrOXYzine pamoate (VISTARIL) 25 MG Cap 7/27/2018 Active   levETIRAcetam (KEPPRA) 500 MG Tab  Active   levothyroxine (SYNTHROID) 125 MCG Tab 8/4/2018 Active   loratadine (CLARITIN) 10 MG Tab  Active   metFORMIN (GLUCOPHAGE) 1000 MG tablet 8/4/2018 Active   mirtazapine (REMERON) 30 MG Tab tablet 8/4/2018 Active   prazosin (MINIPRESS) 1 MG Cap 8/4/2018 Active   predniSONE (DELTASONE) 20 MG Tab  Active                ALLERGIES  Allergies   Allergen Reactions   • Abilify Unspecified     \"Feeling tired, like I don't even know whats going on around me\"   • Fish      Pt reports fish causes him to be sick to his stomach         PHYSICAL EXAM  /82   Pulse (!) 103   Temp 35.9 °C (96.7 °F)   Resp 16   Ht 1.981 m (6' 6\")   Wt (!) 134.4 kg (296 lb 4.8 oz)   SpO2 95%   BMI 34.24 kg/m²   Constitutional: Well developed, Well nourished, No acute distress, Non-toxic appearance.   HENT: Normocephalic, Atraumatic,  Eyes: PERRL, EOM intact  Neck: " Supple, no meningismus  Lymphatic: No lymphadenopathy noted.   Cardiovascular: Regular rate and rhythm  Lungs: Clear to auscultation bilaterally, easy unlabored respirations   Abdomen: Bowel sounds normal, Soft, No tenderness  Skin: Warm, Dry. Diffuse salmon colored dry macular rash that is chronic in appearance with overlying minimal scaling, worsened in the proximal bilateral thighs, no involvement of penis scrotum, or perineum.  Back: No tenderness, No CVA tenderness.   Extremities: No edema to lower extremities  Neurologic: Alert and oriented, appropriate, follows commands, moving all extremities, normal speech   Psychiatric: Affect normal      COURSE & MEDICAL DECISION MAKING  Pertinent Labs & Imaging studies reviewed. (See chart for details)    Patient here with rash which appears psoriatic in nature.  It appears chronic, there is no associated superinfection.  Patient without any fevers or constitutional symptoms.  On review of HR patient has been seen here multiple times for this issue.  As he reports the hydrocortisone cream works I will prescribe this.  I discussed return precautions and depth.  Patient understands and can verbalize understanding.    Obtained and reviewed past medical records. See HPI above.    10:49 PM Patient seen and examined at bedside. The patient presents with diffuse rashes. The rashes appear chronic. Patient states he gets occasional flare ups of the rashes. Today, the rashes flared up after swimming at his friend's swimming pool. The patient will be discharged with instructions regarding supportive care and medications. Patient reports having good results with hydrocortisone cream in the past. Will write a prescription for hydrocortisone 2.5% cream. Instructions were given for follow-up. Discussed indications for seeking immediate medical attention. Patient was given the opportunity for questions. The patient understands and agrees.       The patient will return for new or  worsening symptoms and is stable at the time of discharge.    The patient is referred to a primary physician for blood pressure management, diabetic screening, and for all other preventative health concerns.      DISPOSITION:  Patient will be discharged home in stable condition.    FOLLOW UP:  ANIKET Chung  [address] [place] [id number]  [phone]    Schedule an appointment as soon as possible for a visit        OUTPATIENT MEDICATIONS:  Discharge Medication List as of 8/25/2018 11:01 PM           FINAL IMPRESSION  1.  Dermatitis, questionable psoriasis      Jovan ROB (Scribe), am scribing for, and in the presence of, Sal Gan M.D..    Electronically signed by: Jovan Crane (Robertibe), 8/25/2018    Sal ROB M.D. personally performed the services described in this documentation, as scribed by Jovan Crane in my presence, and it is both accurate and complete.    The note accurately reflects work and decisions made by me.  Sal Gan  8/26/2018  2:08 AM

## 2018-08-26 NOTE — DISCHARGE INSTRUCTIONS
Please follow-up with your primary care physician for ongoing care of this rash.  If you develop fever or have any other concerns return to the emergency department.    Psoriasis  Introduction  Psoriasis is a long-term (chronic) skin condition. It causes raised, red patches (plaques) on your skin that look silvery. The red patches may show up anywhere on your body. They can be any size or shape.  Psoriasis can come and go. It can range from mild to very bad. It cannot be passed from one person to another (not contagious). There is no cure for this condition, but it can be helped with treatment.  Follow these instructions at home:  Skin Care  · Apply moisturizers to your skin as needed. Only use those that your doctor has said are okay.  · Apply cool compresses to the affected areas.  · Do not scratch your skin.  Lifestyle  · Do not use tobacco products. This includes cigarettes, chewing tobacco, and e-cigarettes. If you need help quitting, ask your doctor.  · Drink little or no alcohol.  · Try to lower your stress. Meditation or yoga may help.  · Get sun as told by your doctor. Do not get sunburned.  · Think about joining a psoriasis support group.  Medicines  · Take or use over-the-counter and prescription medicines only as told by your doctor.  · If you were prescribed an antibiotic, take or use it as told by your doctor. Do not stop taking the antibiotic even if your condition starts to get better.  General instructions  · Keep a journal. Use this to help track what triggers an outbreak. Try to avoid any triggers.  · See a counselor or  if you feel very sad, upset, or hopeless about your condition and these feelings affect your work or relationships.  · Keep all follow-up visits as told by your doctor. This is important.  Contact a doctor if:  · Your pain gets worse.  · You have more redness or warmth in the affected areas.  · You have new pain or stiffness in your joints.  · Your pain or stiffness  in your joints gets worse.  · Your nails start to break easily.  · Your nails pull away from the nail bed easily.  · You have a fever.  · You feel very sad (depressed).  This information is not intended to replace advice given to you by your health care provider. Make sure you discuss any questions you have with your health care provider.  Document Released: 01/25/2006 Document Revised: 05/25/2017 Document Reviewed: 05/04/2016  © 2017 [employer]

## 2018-09-13 ENCOUNTER — HOSPITAL ENCOUNTER (EMERGENCY)
Facility: MEDICAL CENTER | Age: 38
End: 2018-09-13
Attending: EMERGENCY MEDICINE
Payer: MEDICAID

## 2018-09-13 VITALS
BODY MASS INDEX: 33.9 KG/M2 | TEMPERATURE: 97.9 F | HEIGHT: 78 IN | OXYGEN SATURATION: 95 % | RESPIRATION RATE: 16 BRPM | WEIGHT: 292.99 LBS | SYSTOLIC BLOOD PRESSURE: 128 MMHG | DIASTOLIC BLOOD PRESSURE: 79 MMHG | HEART RATE: 82 BPM

## 2018-09-13 DIAGNOSIS — T30.0 SUPERFICIAL BURN: ICD-10-CM

## 2018-09-13 DIAGNOSIS — L55.9 SUNBURN: ICD-10-CM

## 2018-09-13 PROCEDURE — A9270 NON-COVERED ITEM OR SERVICE: HCPCS | Performed by: EMERGENCY MEDICINE

## 2018-09-13 PROCEDURE — 700102 HCHG RX REV CODE 250 W/ 637 OVERRIDE(OP): Performed by: EMERGENCY MEDICINE

## 2018-09-13 PROCEDURE — 99284 EMERGENCY DEPT VISIT MOD MDM: CPT

## 2018-09-13 RX ORDER — NAPROXEN 250 MG/1
250 TABLET ORAL ONCE
Status: COMPLETED | OUTPATIENT
Start: 2018-09-13 | End: 2018-09-13

## 2018-09-13 RX ORDER — NAPROXEN 500 MG/1
500 TABLET ORAL 2 TIMES DAILY WITH MEALS
Qty: 60 TAB | Refills: 0 | Status: SHIPPED | OUTPATIENT
Start: 2018-09-13 | End: 2018-12-31

## 2018-09-13 RX ADMIN — Medication 250 MG: at 15:00

## 2018-09-13 ASSESSMENT — PAIN SCALES - GENERAL: PAINLEVEL_OUTOF10: 7

## 2018-09-13 NOTE — ED PROVIDER NOTES
ED Provider Note    Scribed for Ramos López D.O. by Paris Do. 9/13/2018  2:24 PM    Primary care provider: ANIKET Chung  Means of arrival: Private vehicle  History obtained from: Patient  History limited by: None    CHIEF COMPLAINT  Chief Complaint   Patient presents with   • Sunburn       HPI  Norm Prabhakar is a 38 y.o. male who presents to the Emergency Department for evaluation of a sunburn to his face, bilateral arms, and bilateral legs which began on 3 days ago. Patient states he ran out of sunscreen and has been working at the [employer] in the sun the past several days. He reports pain diffusely throughout and his pain is exacerbated with movement and he could not sleep last night secondary to his pain. Patient was sent by [facility] for concern of possible second degree burns. No other acute medical complaints or concerns.     REVIEW OF SYSTEMS  Pertinent positives include sunburn to his bilateral arms, face, and legs.     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   • Hypothyroidism    • Indigestion     once in a while   • Mental disorder     learning disabilities; speech impairment; developmental delays   • Murmur     since birth   • Pneumonia     remote   • Psychiatric problem 2002    PTSD   • S/P thyroidectomy    • Seizure (HCC) 2010   • Seizure disorder (HCC)    • Unspecified disorder of thyroid        SURGICAL HISTORY  Past Surgical History:   Procedure Laterality Date   • EYE SURGERY     • OTHER      Hernia Repair when he was 10 yrs old   • THYROID LOBECTOMY          SOCIAL HISTORY  Social History   Substance Use Topics   • Smoking status: Former Smoker     Packs/day: 0.25     Types: Cigarettes, Cigars     Quit date: 5/1/2018   • Smokeless tobacco: Never Used   • Alcohol use  "No      History   Drug Use   • Types: Inhaled     Comment: marijuana       FAMILY HISTORY  Family History   Problem Relation Age of Onset   • Hypertension Mother    • Heart Disease Mother    • Lung Disease Mother    • Stroke Maternal Grandmother        CURRENT MEDICATIONS  Home Medications     Reviewed by Khloe Gómez R.N. (Registered Nurse) on 09/13/18 at 1421  Med List Status: Not Addressed   Medication Last Dose Status   atorvastatin (LIPITOR) 80 MG tablet 8/4/2018 Active   busPIRone (BUSPAR) 10 MG Tab 8/4/2018 Active   Cholecalciferol ([employer] D3) 2000 UNIT Cap 8/4/2018 Active   divalproex (DEPAKOTE) 500 MG Tablet Delayed Response 8/4/2018 Active   EPINEPHrine (EPIPEN 2-SONDRA) 0.3 MG/0.3ML Solution Auto-injector solution for injection  Active   glimepiride (AMARYL) 4 MG Tab 8/4/2018 Active   hydrocortisone 2.5 % Cream topical cream  Active   hydrOXYzine pamoate (VISTARIL) 25 MG Cap 7/27/2018 Active   levETIRAcetam (KEPPRA) 500 MG Tab  Active   levothyroxine (SYNTHROID) 125 MCG Tab 8/4/2018 Active   loratadine (CLARITIN) 10 MG Tab  Active   metFORMIN (GLUCOPHAGE) 1000 MG tablet 8/4/2018 Active   mirtazapine (REMERON) 30 MG Tab tablet 8/4/2018 Active   prazosin (MINIPRESS) 1 MG Cap 8/4/2018 Active   predniSONE (DELTASONE) 20 MG Tab  Active                ALLERGIES  Allergies   Allergen Reactions   • Abilify Unspecified     \"Feeling tired, like I don't even know whats going on around me\"   • Fish      Pt reports fish causes him to be sick to his stomach         PHYSICAL EXAM  VITAL SIGNS: /85   Pulse 95   Temp 36.6 °C (97.9 °F) (Temporal)   Resp 18   Ht 1.981 m (6' 6\")   Wt (!) 132.9 kg (292 lb 15.9 oz)   SpO2 97%   BMI 33.86 kg/m²     Constitutional: Well developed, Well nourished, No acute distress, Non-toxic appearance.   HENT: Normocephalic, Atraumatic.  Cardiovascular: Good pulses.  Thorax & Lungs:  No respiratory distress.   Skin: Warm, Dry. 1st degree burns to the dorsums of his bilateral " "forearms and anterior distal thighs down to his calves.   Extremities: Full range of motion. 1st degree burns to the dorsums of his bilateral forearms and anterior distal thighs down to his calves.  No bullae formation, no partial-thickness or full-thickness burn  Neurologic: Alert & oriented x 3,  No focal deficits noted.    COURSE & MEDICAL DECISION MAKING  Nursing notes, VS, PMSFHx reviewed in chart.    2:24 PM - Patient seen and examined at bedside. Patient will be treated with Naprosyn 250 ng. Patient presents today with a 1st degree sunburn to the dorsums of his bilateral forearms and anterior distal thighs down to his calves. Encouraged the patient apply aloe vera after sun lotion diffusely throughout and Bacitracin to the areas that are worst several times daily for the next several days to help with his sunburn. Additionally, I informed the patient he must apply sunscreen to his face and cover up his body for the next several days until his sunburn resolves. He will be discharged home with a prescription for Naproxen for pain control. Discussed strict return precautions and follow up instructions and patient is comfortable with discharge at this time. His vitals prior to discharge are: /79   Pulse 82   Temp 36.6 °C (97.9 °F) (Temporal)   Resp 16   Ht 1.981 m (6' 6\")   Wt (!) 132.9 kg (292 lb 15.9 oz)   SpO2 95%   BMI 33.86 kg/m²         Follow up with your primary care physician for re-evaluation of your blood pressure.    DISPOSITION:  Patient will be discharged home in stable condition.    FOLLOW UP:  [facility], Emergency Dept  [address] [address] [phone]    If symptoms worsen    Lori Monroe, A.P.R.N.  [address]  [facility] [address] [phone]    Schedule an appointment as soon as possible for a visit in 1 week      OUTPATIENT MEDICATIONS:  New Prescriptions    NAPROXEN (NAPROSYN) 500 MG TAB    Take 1 Tab by mouth 2 times a day, " with meals.     FINAL IMPRESSION  1. Superficial burn    2. Sunburn        IParis (Scribe), am scribing for, and in the presence of, Ramos López D.O.    Electronically signed by: Paris Do (Scribe), 9/13/2018    Ramos ROB D.O. personally performed the services described in this documentation, as scribed by Paris Do in my presence, and it is both accurate and complete. E.     The note accurately reflects work and decisions made by me.  Ramos López  9/13/2018  9:23 PM

## 2018-09-13 NOTE — ED TRIAGE NOTES
Pt c/o sunburn to bilateral arms and bilateral legs, redness noted. Pt states he forgot to wear sunscreen.

## 2018-09-13 NOTE — ED NOTES
Pt sent here from [facility] for farther evaluation,  Peeling red skin noted to (B) U and LEs, and face.

## 2018-09-13 NOTE — ED NOTES
VSS.  Discharge instructions and prescriptions x1 given- verbalizes understanding.   Assisted to lobby in w/c

## 2018-09-13 NOTE — DISCHARGE INSTRUCTIONS
Please use an aloe vera based anti-sun solution  solution for your sunburn.    Burn Care  Your skin is a natural barrier to infection. It is the largest organ of your body. Burns damage this natural protection. To help prevent infection, it is very important to follow your caregiver's instructions in the care of your burn.  Burns are classified as:  · First degree. There is only redness of the skin (erythema). No scarring is expected.  · Second degree. There is blistering of the skin. Scarring may occur with deeper burns.  · Third degree. All layers of the skin are injured, and scarring is expected.  HOME CARE INSTRUCTIONS   · Wash your hands well before changing your bandage.  · Change your bandage as often as directed by your caregiver.  ¨ Remove the old bandage. If the bandage sticks, you may soak it off with cool, clean water.  ¨ Cleanse the burn thoroughly but gently with mild soap and water.  ¨ Pat the area dry with a clean, dry cloth.  ¨ Apply a thin layer of antibacterial cream to the burn.  ¨ Apply a clean bandage as instructed by your caregiver.  ¨ Keep the bandage as clean and dry as possible.  · Elevate the affected area for the first 24 hours, then as instructed by your caregiver.  · Only take over-the-counter or prescription medicines for pain, discomfort, or fever as directed by your caregiver.  SEEK IMMEDIATE MEDICAL CARE IF:   · You develop excessive pain.  · You develop redness, tenderness, swelling, or red streaks near the burn.  · The burned area develops yellowish-white fluid (pus) or a bad smell.  · You have a fever.  MAKE SURE YOU:   · Understand these instructions.  · Will watch your condition.  · Will get help right away if you are not doing well or get worse.     This information is not intended to replace advice given to you by your health care provider. Make sure you discuss any questions you have with your health care provider.     Document Released: 12/18/2006 Document Revised:  03/11/2013 Document Reviewed: 05/09/2012  [employer] Interactive Patient Education ©2016 [employer] Inc[employer]    Sunburn  Sunburn is damage to the skin that is caused by overexposure to ultraviolet (UV) rays. Repeated sun exposure causes early skin aging, such as wrinkles and sun spots. It also increases the risk of skin cancer.   CAUSES  Sunburn is caused by getting too much UV radiation from the sun.  RISK FACTORS  The following factors may make you more likely to develop this condition:  · Having a family history of sensitivity to the sun.  · Having certain diseases, such as lupus.  · Taking certain medicines.  · Using certain cosmetics.  · Having light-colored skin (light complexion).  SYMPTOMS  Symptoms of this condition include:  · Red or pink skin.  · Soreness and swelling of the affected areas.  · Pain.  · Blisters.  · Peeling skin.  You may also have a headache, fever, or fatigue if the sunburn covers a large part of your body.  DIAGNOSIS  This condition is diagnosed with a medical history and physical exam.  TREATMENT  Treatment focuses on managing your symptoms. Treatment may include:  · Medicines to reduce swelling.  · Steroid medicines to help with inflammation and itching. These may be applied as creams or taken by mouth (orally).  · Antibiotic cream or ointment to apply to any blisters that break open.  HOME CARE INSTRUCTIONS  Medicines   · Take or apply over-the-counter and prescription medicines only as told by your health care provider.  · If you were prescribed an antibiotic medicine, use it as told by your health care provider. Do not stop using the antibiotic even if your condition improves.  General Instructions   · Avoid further exposure to the sun. Protect sunburned skin by wearing clothing that covers the injured skin.  · Do not put ice on your sunburn. This can cause further damage. Try taking a cool bath or applying a cool, wet cloth (cool compress) to your skin. This may help with pain.  · Drink  enough fluid to keep your urine clear or pale yellow.  · Try applying aloe vera or a moisturizer that has soy in it to your sunburn. This may help. Do not apply aloe vera or moisturizer with soy if your sunburn has blisters.  · Do not break any blisters that you may have.  PREVENTION  · Try to avoid the sun between 10:00 a.m. and 2:00 p.m. when it is the strongest.  · Apply sunscreen at least 15 minutes before exposure to the sun.  · Apply a sunscreen with an SPF of 15 or higher. Consider using an SPF of 30 or higher if you will be exposed to the sun for prolonged periods of time. Use a sunscreen that protects against all of the sun's rays (broad-spectrum) and is water-resistant.  · Reapply sunscreen:  ¨ About every two hours during sun exposure.  ¨ More often when sweating a lot while out in the sun.  ¨ After getting wet from swimming or playing in water.  · Wear long sleeves, a hat, and sunglasses that block UV light when you are outside.  · Talk with your health care provider about medicines, herbs, and foods that can make you more sensitive to light. Avoid these, if possible.  · Do not use tanning beds.  SEEK MEDICAL CARE IF:  · You have a fever.  · Your symptoms do not improve with treatment.  · Your pain is not controlled with medicine.  · Your burn becomes more painful and swollen.  SEEK IMMEDIATE MEDICAL CARE IF:  · You start to vomit or have diarrhea.  · You feel faint or you pass out.  · You have a headache and you feel confused.  · You develop severe blistering.  · You have pus or fluid coming from the blisters.  This information is not intended to replace advice given to you by your health care provider. Make sure you discuss any questions you have with your health care provider.  Document Released: 09/27/2006 Document Revised: 04/10/2017 Document Reviewed: 06/20/2016  Elsevier Interactive Patient Education © 2017 Elsevier Inc.

## 2018-09-24 ENCOUNTER — HOSPITAL ENCOUNTER (EMERGENCY)
Facility: MEDICAL CENTER | Age: 38
End: 2018-09-24
Attending: EMERGENCY MEDICINE
Payer: MEDICAID

## 2018-09-24 VITALS
HEIGHT: 78 IN | TEMPERATURE: 98.8 F | BODY MASS INDEX: 34.18 KG/M2 | OXYGEN SATURATION: 96 % | SYSTOLIC BLOOD PRESSURE: 120 MMHG | WEIGHT: 295.42 LBS | RESPIRATION RATE: 16 BRPM | HEART RATE: 74 BPM | DIASTOLIC BLOOD PRESSURE: 74 MMHG

## 2018-09-24 DIAGNOSIS — G43.809 OTHER MIGRAINE WITHOUT STATUS MIGRAINOSUS, NOT INTRACTABLE: ICD-10-CM

## 2018-09-24 DIAGNOSIS — R42 DIZZINESS: ICD-10-CM

## 2018-09-24 LAB
ALBUMIN SERPL BCP-MCNC: 4.2 G/DL (ref 3.2–4.9)
ALBUMIN/GLOB SERPL: 1.9 G/DL
ALP SERPL-CCNC: 45 U/L (ref 30–99)
ALT SERPL-CCNC: 8 U/L (ref 2–50)
ANION GAP SERPL CALC-SCNC: 6 MMOL/L (ref 0–11.9)
AST SERPL-CCNC: 11 U/L (ref 12–45)
BASOPHILS # BLD AUTO: 0.5 % (ref 0–1.8)
BASOPHILS # BLD: 0.05 K/UL (ref 0–0.12)
BILIRUB SERPL-MCNC: 0.6 MG/DL (ref 0.1–1.5)
BUN SERPL-MCNC: 25 MG/DL (ref 8–22)
CALCIUM SERPL-MCNC: 9 MG/DL (ref 8.5–10.5)
CHLORIDE SERPL-SCNC: 103 MMOL/L (ref 96–112)
CO2 SERPL-SCNC: 26 MMOL/L (ref 20–33)
CREAT SERPL-MCNC: 0.96 MG/DL (ref 0.5–1.4)
EOSINOPHIL # BLD AUTO: 0.14 K/UL (ref 0–0.51)
EOSINOPHIL NFR BLD: 1.5 % (ref 0–6.9)
ERYTHROCYTE [DISTWIDTH] IN BLOOD BY AUTOMATED COUNT: 43.9 FL (ref 35.9–50)
GLOBULIN SER CALC-MCNC: 2.2 G/DL (ref 1.9–3.5)
GLUCOSE SERPL-MCNC: 126 MG/DL (ref 65–99)
HCT VFR BLD AUTO: 39.1 % (ref 42–52)
HGB BLD-MCNC: 12.8 G/DL (ref 14–18)
IMM GRANULOCYTES # BLD AUTO: 0.1 K/UL (ref 0–0.11)
IMM GRANULOCYTES NFR BLD AUTO: 1.1 % (ref 0–0.9)
LYMPHOCYTES # BLD AUTO: 3.26 K/UL (ref 1–4.8)
LYMPHOCYTES NFR BLD: 34.7 % (ref 22–41)
MCH RBC QN AUTO: 30.8 PG (ref 27–33)
MCHC RBC AUTO-ENTMCNC: 32.7 G/DL (ref 33.7–35.3)
MCV RBC AUTO: 94 FL (ref 81.4–97.8)
MONOCYTES # BLD AUTO: 1 K/UL (ref 0–0.85)
MONOCYTES NFR BLD AUTO: 10.6 % (ref 0–13.4)
NEUTROPHILS # BLD AUTO: 4.84 K/UL (ref 1.82–7.42)
NEUTROPHILS NFR BLD: 51.6 % (ref 44–72)
NRBC # BLD AUTO: 0 K/UL
NRBC BLD-RTO: 0 /100 WBC
PLATELET # BLD AUTO: 265 K/UL (ref 164–446)
PMV BLD AUTO: 9.9 FL (ref 9–12.9)
POTASSIUM SERPL-SCNC: 3.5 MMOL/L (ref 3.6–5.5)
PROT SERPL-MCNC: 6.4 G/DL (ref 6–8.2)
RBC # BLD AUTO: 4.16 M/UL (ref 4.7–6.1)
SODIUM SERPL-SCNC: 135 MMOL/L (ref 135–145)
WBC # BLD AUTO: 9.4 K/UL (ref 4.8–10.8)

## 2018-09-24 PROCEDURE — 700105 HCHG RX REV CODE 258: Performed by: EMERGENCY MEDICINE

## 2018-09-24 PROCEDURE — 96374 THER/PROPH/DIAG INJ IV PUSH: CPT

## 2018-09-24 PROCEDURE — 80053 COMPREHEN METABOLIC PANEL: CPT

## 2018-09-24 PROCEDURE — 99284 EMERGENCY DEPT VISIT MOD MDM: CPT

## 2018-09-24 PROCEDURE — 85025 COMPLETE CBC W/AUTO DIFF WBC: CPT

## 2018-09-24 PROCEDURE — 96375 TX/PRO/DX INJ NEW DRUG ADDON: CPT

## 2018-09-24 PROCEDURE — 700111 HCHG RX REV CODE 636 W/ 250 OVERRIDE (IP): Performed by: EMERGENCY MEDICINE

## 2018-09-24 RX ORDER — METOCLOPRAMIDE HYDROCHLORIDE 5 MG/ML
10 INJECTION INTRAMUSCULAR; INTRAVENOUS ONCE
Status: COMPLETED | OUTPATIENT
Start: 2018-09-24 | End: 2018-09-24

## 2018-09-24 RX ORDER — ONDANSETRON 2 MG/ML
4 INJECTION INTRAMUSCULAR; INTRAVENOUS ONCE
Status: COMPLETED | OUTPATIENT
Start: 2018-09-24 | End: 2018-09-24

## 2018-09-24 RX ORDER — HYDROCODONE BITARTRATE AND ACETAMINOPHEN 5; 325 MG/1; MG/1
1 TABLET ORAL EVERY 6 HOURS PRN
Qty: 12 TAB | Refills: 0 | Status: SHIPPED | OUTPATIENT
Start: 2018-09-24 | End: 2018-09-28

## 2018-09-24 RX ORDER — MORPHINE SULFATE 4 MG/ML
4 INJECTION, SOLUTION INTRAMUSCULAR; INTRAVENOUS ONCE
Status: COMPLETED | OUTPATIENT
Start: 2018-09-24 | End: 2018-09-24

## 2018-09-24 RX ORDER — SODIUM CHLORIDE 9 MG/ML
1000 INJECTION, SOLUTION INTRAVENOUS ONCE
Status: COMPLETED | OUTPATIENT
Start: 2018-09-24 | End: 2018-09-24

## 2018-09-24 RX ORDER — ONDANSETRON 4 MG/1
4 TABLET, ORALLY DISINTEGRATING ORAL EVERY 8 HOURS PRN
Qty: 10 TAB | Refills: 0 | Status: SHIPPED | OUTPATIENT
Start: 2018-09-24 | End: 2018-09-28

## 2018-09-24 RX ADMIN — SODIUM CHLORIDE 1000 ML: 9 INJECTION, SOLUTION INTRAVENOUS at 18:09

## 2018-09-24 RX ADMIN — METOCLOPRAMIDE 10 MG: 5 INJECTION, SOLUTION INTRAMUSCULAR; INTRAVENOUS at 18:13

## 2018-09-24 RX ADMIN — MORPHINE SULFATE 4 MG: 4 INJECTION INTRAVENOUS at 19:36

## 2018-09-24 RX ADMIN — ONDANSETRON 4 MG: 2 INJECTION INTRAMUSCULAR; INTRAVENOUS at 18:11

## 2018-09-24 ASSESSMENT — LIFESTYLE VARIABLES: DO YOU DRINK ALCOHOL: NO

## 2018-09-24 ASSESSMENT — PAIN SCALES - GENERAL
PAINLEVEL_OUTOF10: 8
PAINLEVEL_OUTOF10: 2

## 2018-09-24 NOTE — ED TRIAGE NOTES
Pt amb to triage.  Chief Complaint   Patient presents with   • Dizziness     since this am   • Lightheadedness     since this am   • Head Ache     x2d, hx of similar migraines     No relief w/ home meds.

## 2018-09-25 NOTE — ED NOTES
This RN to bedside, pt resting in bed, eyes closed. This RN calling pt name, pt opens eyes. Pt reports decrease in pain after medication.

## 2018-09-25 NOTE — ED NOTES
Received report from Amee. Assumed care of pt at this time. Pt alert and oriented x4, GCS 15, resp even and nl. Pt on full cardiac monitor. Bed locked in low position, call light within reach. Denies needs at this time. Will continue to monitor.

## 2018-09-25 NOTE — ED NOTES
Pt provided discharge instructions, pt verbalized understanding of discharge instructions. Pt alert and oriented x4, gcs15, VSS, resp even and nl. IV catheter removed, catheter intact, pressure and guaze applied. Pt provided RX x2. Pt verbalized understanding of RX. This RX explained narcotic agreement to pt. Pt signed narcotic agreement and verbalized understanding to this RN. Pt ambulates to exit with steady gait.

## 2018-09-25 NOTE — ED PROVIDER NOTES
ED Provider Note    HPI: Patient is a 38-year-old male who presented to the emergency department September 24, 2018 at 3:36 PM with a chief complaint of dizziness lightheadedness and headache.    Patient's primary complaint is of dizziness and feeling lightheaded over the last 12 hours or so.  He reports decreased p.o. intake due to his discomfort and dizziness.  He also states he has chronic migraine headache and seems to be having one.  He did not receive his headache to be anything different than his previous headaches.  He has had no neck stiffness.  He has not had a fever.  He denied chills.  No abdominal pain or vomiting.  No change in bladder or bowel habits.  No other somatic complaints.    Review of Systems: Positive for dizziness lightheadedness headache negative for fever neck stiffness abdominal pain vomiting change in bladder or bowel habits.  Review of systems reviewed with patient, all other systems negative    Past medical/surgical history: Chronic migraine elevated cholesterol hypothyroid diabetes hypertension    Medications: Lipitor BuSpar Depakote Amaryl Keppra Synthroid Glucophage Remeron Minipress prednisone    Allergies: Patient does not tolerate Abilify food allergies    Social History: Patient smokes one quarter pack of cigarettes per day no alcohol use      Physical exam: Constitutional: Well-developed well-nourished male awake alert appeared tired  Vital signs: Temperature 97.9 pulse 95 blood pressure 121/85 pulse oximetry 97% respirations 18  EYES: PERRL, EOMI, Conjunctivae and sclera normal, eyelids normal bilaterally.  Neck: Trachea midline. No cervical masses seen or palpated. Normal range of motion, supple. No meningeal signs elicited.  Cardiac: Regular rate and rhythm. S1-S2 present. No S3 or S4 present. No murmurs, rubs, or gallops heard. No edema or varicosities were seen.   Lungs: Clear to auscultation with good aeration throughout. No wheezes, rales, or rhonchi heard. Patient's  chest wall moved symmetrically with each respiratory effort. Patient was not making use of accessory muscles of respiration in breathing.  Abdomen: Soft nontender to palpation. No rebound or guarding elicited. No organomegaly identified. No pulsatile abdominal masses identified.   Musculoskeletal:  no  pain with palpitation or movement of muscle, bone or joint , no obvious musculoskeletal deformities identified.  Neurologic: alert and awake answers questions appropriately. Moves all four extremities independently, no gross focal abnormalities identified. Normal strength and motor.  Skin: Healing burns on both arms which the patient states were due to sunburn (patient previously seen here for this) mucous membranes appear somewhat dry.  Psychiatric: Patient appear to be somewhat anxious.  He was not delusional or hallucinating    Medical decision making: Drug database reviewed, no evidence of inappropriate utilization    Given the presentation of nausea and dry mucous membranes with decreased oral intake I suspect patient is somewhat dehydrated.  He is given a liter .9 normal saline along with Reglan and Zofran.  Due to continued headache he was given a dose of morphine.    Laboratory studies obtained (please see lab sheet for all results) significant findings include unremarkable CBC.  Patient appears to be somewhat dehydrated with a BUN of 25.  Chemistry panel showed minimal hypokalemia.    Recheck after the liter bolus showed the patient feeling better.  He was given a prescription for small amount of pain medication and Zofran (see below) he is to call his primary care provider for follow-up tomorrow.  The patient is carefully counseled return to ED for change in headache pattern worsening headache fever vomiting or any other problems    Patient suspects this is a migraine headache and given his history this certainly seems likely.  He has no signs or symptoms of meningitis or pneumonia his laboratory work is  reassuring.  His dizziness is most likely due to dehydration given the elevated BUN.  Outpatient treatment and follow-up seems reasonable    I reviewed prescription monitoring program for patient's narcotic use before prescribing a scheduled drug.The patient will not drink alcohol nor drive with prescribed medications. The patient will return for new or worsening symptoms and is stable at the time of discharge.        In prescribing controlled substances to this patient, I certify that I have obtained and reviewed the medical history of . I have also made a good nori effort to obtain applicable records from other providers who have treated the patient and records did not demonstrate any increased risk of substance abuse that would prevent me from prescribing controlled substances.      I have conducted a physical exam and documented it. I have reviewed Mr Farshad Prabhakar's prescription history as maintained by the [place] Prescription Monitoring Program.      I have assessed the patient’s risk for abuse, dependency, and addiction using the validated Opioid Risk Tool available at https://www.mdcalc.com/khxssp-tvwj-bypk-ort-narcotic-abuse.      Given the above, I believe the benefits of controlled substance therapy outweigh the risks. The reasons for prescribing controlled substances include in my professional opinion, controlled substances are the only reasonable choice for this patientAccordingly, I have discussed the risk and benefits, treatment plan, and alternative therapies with the patient.    Impression 1) dizziness  2) migraine headache not intractable

## 2018-10-09 ENCOUNTER — HOSPITAL ENCOUNTER (EMERGENCY)
Facility: MEDICAL CENTER | Age: 38
End: 2018-10-10
Attending: EMERGENCY MEDICINE
Payer: MEDICAID

## 2018-10-09 DIAGNOSIS — R56.9 SEIZURE (HCC): ICD-10-CM

## 2018-10-09 LAB
BASOPHILS # BLD AUTO: 0.9 % (ref 0–1.8)
BASOPHILS # BLD: 0.07 K/UL (ref 0–0.12)
EOSINOPHIL # BLD AUTO: 0.11 K/UL (ref 0–0.51)
EOSINOPHIL NFR BLD: 1.4 % (ref 0–6.9)
ERYTHROCYTE [DISTWIDTH] IN BLOOD BY AUTOMATED COUNT: 45.1 FL (ref 35.9–50)
HCT VFR BLD AUTO: 39.1 % (ref 42–52)
HGB BLD-MCNC: 13.3 G/DL (ref 14–18)
IMM GRANULOCYTES # BLD AUTO: 0.06 K/UL (ref 0–0.11)
IMM GRANULOCYTES NFR BLD AUTO: 0.8 % (ref 0–0.9)
LYMPHOCYTES # BLD AUTO: 3.78 K/UL (ref 1–4.8)
LYMPHOCYTES NFR BLD: 48 % (ref 22–41)
MCH RBC QN AUTO: 32 PG (ref 27–33)
MCHC RBC AUTO-ENTMCNC: 34 G/DL (ref 33.7–35.3)
MCV RBC AUTO: 94 FL (ref 81.4–97.8)
MONOCYTES # BLD AUTO: 1.06 K/UL (ref 0–0.85)
MONOCYTES NFR BLD AUTO: 13.5 % (ref 0–13.4)
NEUTROPHILS # BLD AUTO: 2.79 K/UL (ref 1.82–7.42)
NEUTROPHILS NFR BLD: 35.4 % (ref 44–72)
NRBC # BLD AUTO: 0 K/UL
NRBC BLD-RTO: 0 /100 WBC
PLATELET # BLD AUTO: 295 K/UL (ref 164–446)
PMV BLD AUTO: 9.9 FL (ref 9–12.9)
RBC # BLD AUTO: 4.16 M/UL (ref 4.7–6.1)
WBC # BLD AUTO: 7.9 K/UL (ref 4.8–10.8)

## 2018-10-09 PROCEDURE — 80053 COMPREHEN METABOLIC PANEL: CPT

## 2018-10-09 PROCEDURE — 85025 COMPLETE CBC W/AUTO DIFF WBC: CPT

## 2018-10-09 PROCEDURE — 36415 COLL VENOUS BLD VENIPUNCTURE: CPT

## 2018-10-09 PROCEDURE — 99285 EMERGENCY DEPT VISIT HI MDM: CPT

## 2018-10-09 PROCEDURE — 80164 ASSAY DIPROPYLACETIC ACD TOT: CPT

## 2018-10-09 ASSESSMENT — PAIN SCALES - GENERAL: PAINLEVEL_OUTOF10: 9

## 2018-10-09 ASSESSMENT — LIFESTYLE VARIABLES: DO YOU DRINK ALCOHOL: NO

## 2018-10-10 VITALS
SYSTOLIC BLOOD PRESSURE: 125 MMHG | HEART RATE: 78 BPM | OXYGEN SATURATION: 94 % | TEMPERATURE: 98.5 F | BODY MASS INDEX: 39.55 KG/M2 | WEIGHT: 292 LBS | RESPIRATION RATE: 19 BRPM | DIASTOLIC BLOOD PRESSURE: 70 MMHG | HEIGHT: 72 IN

## 2018-10-10 LAB
ALBUMIN SERPL BCP-MCNC: 4.1 G/DL (ref 3.2–4.9)
ALBUMIN/GLOB SERPL: 1.7 G/DL
ALP SERPL-CCNC: 60 U/L (ref 30–99)
ALT SERPL-CCNC: 12 U/L (ref 2–50)
ANION GAP SERPL CALC-SCNC: 13 MMOL/L (ref 0–11.9)
AST SERPL-CCNC: 14 U/L (ref 12–45)
BILIRUB SERPL-MCNC: 0.4 MG/DL (ref 0.1–1.5)
BUN SERPL-MCNC: 6 MG/DL (ref 8–22)
CALCIUM SERPL-MCNC: 8.5 MG/DL (ref 8.5–10.5)
CHLORIDE SERPL-SCNC: 104 MMOL/L (ref 96–112)
CO2 SERPL-SCNC: 22 MMOL/L (ref 20–33)
CREAT SERPL-MCNC: 0.86 MG/DL (ref 0.5–1.4)
GLOBULIN SER CALC-MCNC: 2.4 G/DL (ref 1.9–3.5)
GLUCOSE SERPL-MCNC: 211 MG/DL (ref 65–99)
POTASSIUM SERPL-SCNC: 3.6 MMOL/L (ref 3.6–5.5)
PROT SERPL-MCNC: 6.5 G/DL (ref 6–8.2)
SODIUM SERPL-SCNC: 139 MMOL/L (ref 135–145)
VALPROATE SERPL-MCNC: 26 UG/ML (ref 50–100)

## 2018-10-10 PROCEDURE — 700102 HCHG RX REV CODE 250 W/ 637 OVERRIDE(OP): Performed by: EMERGENCY MEDICINE

## 2018-10-10 PROCEDURE — A9270 NON-COVERED ITEM OR SERVICE: HCPCS | Performed by: EMERGENCY MEDICINE

## 2018-10-10 RX ORDER — DIVALPROEX SODIUM 500 MG/1
1500 TABLET, DELAYED RELEASE ORAL ONCE
Status: COMPLETED | OUTPATIENT
Start: 2018-10-10 | End: 2018-10-10

## 2018-10-10 RX ADMIN — DIVALPROEX SODIUM 1500 MG: 500 TABLET, DELAYED RELEASE ORAL at 00:38

## 2018-10-10 NOTE — ED NOTES
Medication administered, see eMAR.  Pt provided box lunch and orange juice, pt eating food.   Will continue to monitor.

## 2018-10-10 NOTE — ED NOTES
This RN called [facility] to inform Meredith pt is returning via EMS, this RN informed Meredith pt is on his way via EMS. Meredith given report, reports will open doors and prepare for his return.

## 2018-10-10 NOTE — ED NOTES
Pt continues to rest in bed, eyes closed, equal rise and fall of chest wall.  Pt remains on full cardiac monitor.  Will continue to monitor.

## 2018-10-10 NOTE — ED NOTES
Pt resting, eyes closed, equal rise and fall of chest wall. Pt remains on full cardiac monitor. Will continue to monitor.

## 2018-10-10 NOTE — ED TRIAGE NOTES
Pt arrived via EMS, per EMS pt found down on floor next to bed. Pt reports he was asleep and then remembers waking up on floor. Hx of seizures. Reports similar feeling to seizure. Pt denies missing medication dose. Reports taking Depakote. Hematoma noted to posterior head, reports head pain 10/10. Pt a&ox4, gcs 15, resp even and nl.

## 2018-10-10 NOTE — ED NOTES
SW reports [facility] called for transport, should arrive within 1 hour. Will continue to monitor pt during this time.

## 2018-10-10 NOTE — ED NOTES
SW notified for transportation to [facility]. Pt remains laying in bed, eyes closed, equal rise and fall of chest wall. Pt remains on full cardiac monitor. Will continue to monitor.

## 2018-10-10 NOTE — ED PROVIDER NOTES
ED Provider Note    Scribed for Lola Lopez M.D. by Nabil Cook. 10/9/2018, 11:07 PM.    Primary care provider: ANIKET Chung  Means of arrival: Ambulane  History obtained from: Patient  History limited by: None    CHIEF COMPLAINT  Chief Complaint   Patient presents with   • Seizure       HPI  Norm Prabhakar is a 38 y.o. male with a history of epilepsy and intermittent breakthrough seizures who presents to the Emergency Department for evaluation after seizure occurring prior to arrival.  He has a history of seizure disorder and reports he has been compliant with his medications.  He lives at a care facility where he is given his medications.  He is followed by neurology at [facility].  He was in bed and woke up next to the bed having felt like he had a seizure.  He confirms hitting the back of his head. He denies any fevers. The patient remembers lying down to go to sleep and then waking up. He reports that he is complaint with his medications and denies any blood thinners. No exacerbating or alleviating factors noted.  He has some nausea but is not been vomiting      REVIEW OF SYSTEMS  Pertinent positives include seizure and hit his head. Pertinent negatives include no fevers.    PAST MEDICAL HISTORY   has a past medical history of Anxiety; ASTHMA; Bipolar 1 disorder (HCC); Depression; Fall; Glaucoma; Glaucoma (1982); Hypothyroidism; Indigestion; Mental disorder; Murmur; Pneumonia; Psychiatric problem (2002); S/P thyroidectomy; Seizure (HCC) (2010); Seizure disorder (HCC); and Unspecified disorder of thyroid.    SURGICAL HISTORY   has a past surgical history that includes eye surgery; thyroid lobectomy; and other.    SOCIAL HISTORY  Social History   Substance Use Topics   • Smoking status: Former Smoker     Packs/day: 0.25     Types: Cigarettes, Cigars     Quit date: 5/1/2018   • Smokeless tobacco: Never Used   • Alcohol use No      History   Drug Use   • Types: Inhaled     " Comment: marijuana       FAMILY HISTORY  Family History   Problem Relation Age of Onset   • Hypertension Mother    • Heart Disease Mother    • Lung Disease Mother    • Stroke Maternal Grandmother        CURRENT MEDICATIONS  Home Medications     Reviewed by Merly Munroe R.N. (Registered Nurse) on 10/09/18 at 2254  Med List Status: Partial   Medication Last Dose Status   atorvastatin (LIPITOR) 80 MG tablet 8/4/2018 Active   busPIRone (BUSPAR) 10 MG Tab 8/4/2018 Active   Cholecalciferol (CVS D3) 2000 UNIT Cap 8/4/2018 Active   divalproex (DEPAKOTE) 500 MG Tablet Delayed Response 8/4/2018 Active   EPINEPHrine (EPIPEN 2-SONDRA) 0.3 MG/0.3ML Solution Auto-injector solution for injection  Active   glimepiride (AMARYL) 4 MG Tab 8/4/2018 Active   hydrocortisone 2.5 % Cream topical cream  Active   hydrOXYzine pamoate (VISTARIL) 25 MG Cap 7/27/2018 Active   levETIRAcetam (KEPPRA) 500 MG Tab  Active   levothyroxine (SYNTHROID) 125 MCG Tab 8/4/2018 Active   loratadine (CLARITIN) 10 MG Tab  Active   metFORMIN (GLUCOPHAGE) 1000 MG tablet 8/4/2018 Active   mirtazapine (REMERON) 30 MG Tab tablet 8/4/2018 Active   naproxen (NAPROSYN) 500 MG Tab  Active   prazosin (MINIPRESS) 1 MG Cap 8/4/2018 Active   predniSONE (DELTASONE) 20 MG Tab  Active                ALLERGIES  Allergies   Allergen Reactions   • Abilify Unspecified     \"Feeling tired, like I don't even know whats going on around me\"   • Fish      Pt reports fish causes him to be sick to his stomach         PHYSICAL EXAM  VITAL SIGNS: /70   Pulse 92   Temp 36.7 °C (98.1 °F)   Resp 18   Ht 1.829 m (6')   Wt (!) 132.5 kg (292 lb)   BMI 39.60 kg/m²   Constitutional: Alert in no apparent distress.  HENT: No signs of trauma, Bilateral external ears normal, Nose normal.   Eyes: Conjunctiva normal, Non-icteric.   Neck: Normal range of motion, No tenderness, Supple, No stridor.   Cardiovascular: Regular rate and rhythm, no murmurs.   Thorax & Lungs: Normal breath sounds, No " respiratory distress, No wheezing, No chest tenderness.   Abdomen: Bowel sounds normal, Soft, No tenderness, No masses, No peritoneal signs.  Skin: Warm, Dry, No erythema, No rash.   Musculoskeletal:  No major deformities noted.  Neurologic: Alert, moving all extremities without difficulty.   Psychiatric: Patient is slightly delayed.    LABS  Labs Reviewed   CBC WITH DIFFERENTIAL - Abnormal; Notable for the following:        Result Value    RBC 4.16 (*)     Hemoglobin 13.3 (*)     Hematocrit 39.1 (*)     Neutrophils-Polys 35.40 (*)     Lymphocytes 48.00 (*)     Monocytes 13.50 (*)     Monos (Absolute) 1.06 (*)     All other components within normal limits   COMP METABOLIC PANEL - Abnormal; Notable for the following:     Anion Gap 13.0 (*)     Glucose 211 (*)     Bun 6 (*)     All other components within normal limits   VALPROIC ACID - Abnormal; Notable for the following:     Valproic Acid 26.0 (*)     All other components within normal limits   ESTIMATED GFR   VALPROIC ACID     All labs reviewed by me.    COURSE & MEDICAL DECISION MAKING  Pertinent Labs & Imaging studies reviewed. (See chart for details)    I reviewed the patient's records and he was evaluated on 9/24/18 for dizziness.    Differential diagnoses include but are not limited to: breakthrough seizures.    11:07 PM - Patient seen and examined at bedside. Ordered valproic acid, CBC, and CMP to evaluate his symptoms. I informed the patient that he may not need a CT scan depending on how he is doing after some time.    12:07 AM Patient treated with Depakote 1,500 mg.    12:26 AM I spoke with the patient and he reports that he still feels dizzy. I informed him that he he will receive water and food, then be reevaluated.    2:20 AM  Patient has been able to tolerate his lunch without difficulty is sleeping.  When he is woken up he says that he is nauseous but then falls right back to sleep without any vomiting.  He did not get his Depakote this evening he  will be given his evening dose of this.  His Depakote level is slightly low but his other labs are unremarkable.  Patient is not anticoagulated he has a mild headache but I do not think he requires CT imaging at this time.  He will be discharged back to his care facility.    The patient will return for new or worsening symptoms and is stable at the time of discharge. Patient was given return precautions. Patient and/or family member verbalizes understanding and will comply.    DISPOSITION:  Patient will be discharged home in stable condition.    FOLLOW UP:  ANIKET Chung  [address]  [address]  [place] [place] [facility] [address]  [phone]          [facility], Emergency Dept  [address] [address] [phone]    Please follow up with your neurologist. Return for worsening symptoms, recurrent seizure or other concerns      OUTPATIENT MEDICATIONS:  New Prescriptions    No medications on file         FINAL IMPRESSION  1. Seizure (HCC)           This dictation has been created using voice recognition software and/or scribes. The accuracy of the dictation is limited by the abilities of the software and the expertise of the scribes. I expect there may be some errors of grammar and possibly content. I made every attempt to manually correct the errors within my dictation. However, errors related to voice recognition software and/or scribes may still exist and should be interpreted within the appropriate context.    Nabil ROB (Scribe), am scribing for, and in the presence of, Lola Lopez M.D..     Electronically signed by: Nabil Cook (Scribe), 10/9/2018     Lola ROB M.D. personally performed the services described in this documentation, as scribed by Nabil Cook in my presence, and it is both accurate and complete. E    The note accurately reflects work and decisions made by me.  Lola Lopez  10/10/2018  2:21 AM

## 2018-10-10 NOTE — DISCHARGE PLANNING
Medical Social Work     GABY received a call from the RN requesting assistance with transportation home to [facility] at [address] [place] [address]. GABY was provided with the caregiver Meredith phone number of [phone]. GABY called and spoke with Meredith, GABY advised Meredith that the pt is medically clear to transport back to there facility. Meredith stated she would be waiting for the pt. GABY called [facility] and got a trip ticket from Emily. GABY faxed a PCS form to [facility] and setup transportation with Sofia and the trip is on will call until [facility] calls [facility].     Plan: GABY will remain available for pt support.

## 2018-10-10 NOTE — ED NOTES
REMSA present for transport. Pt provided discharge instructions, pt verbalized understanding of discharge instructions. Pt alert and oriented x4, gcs15, VSS, resp even and nl. IV catheter removed, catheter intact, pressure and guaze applied. Pt ambulates to exit with REMSA.

## 2018-10-10 NOTE — ED NOTES
Pt drank orange juice and ate box lunch. Pt resting in bed now, eyes closed, equal rise and fall of chest wall.

## 2018-10-13 ENCOUNTER — HOSPITAL ENCOUNTER (EMERGENCY)
Facility: MEDICAL CENTER | Age: 38
End: 2018-10-14
Attending: EMERGENCY MEDICINE
Payer: MEDICAID

## 2018-10-13 DIAGNOSIS — R19.7 NAUSEA VOMITING AND DIARRHEA: ICD-10-CM

## 2018-10-13 DIAGNOSIS — R11.2 NAUSEA VOMITING AND DIARRHEA: ICD-10-CM

## 2018-10-13 LAB
ALBUMIN SERPL BCP-MCNC: 4.6 G/DL (ref 3.2–4.9)
ALBUMIN/GLOB SERPL: 1.6 G/DL
ALP SERPL-CCNC: 59 U/L (ref 30–99)
ALT SERPL-CCNC: 11 U/L (ref 2–50)
ANION GAP SERPL CALC-SCNC: 10 MMOL/L (ref 0–11.9)
AST SERPL-CCNC: 19 U/L (ref 12–45)
BASOPHILS # BLD AUTO: 0.5 % (ref 0–1.8)
BASOPHILS # BLD: 0.06 K/UL (ref 0–0.12)
BILIRUB SERPL-MCNC: 0.5 MG/DL (ref 0.1–1.5)
BUN SERPL-MCNC: 20 MG/DL (ref 8–22)
CALCIUM SERPL-MCNC: 9.7 MG/DL (ref 8.5–10.5)
CHLORIDE SERPL-SCNC: 101 MMOL/L (ref 96–112)
CO2 SERPL-SCNC: 28 MMOL/L (ref 20–33)
CREAT SERPL-MCNC: 0.93 MG/DL (ref 0.5–1.4)
EOSINOPHIL # BLD AUTO: 0.09 K/UL (ref 0–0.51)
EOSINOPHIL NFR BLD: 0.7 % (ref 0–6.9)
ERYTHROCYTE [DISTWIDTH] IN BLOOD BY AUTOMATED COUNT: 42.4 FL (ref 35.9–50)
GLOBULIN SER CALC-MCNC: 2.8 G/DL (ref 1.9–3.5)
GLUCOSE SERPL-MCNC: 105 MG/DL (ref 65–99)
HCT VFR BLD AUTO: 38.9 % (ref 42–52)
HGB BLD-MCNC: 13.1 G/DL (ref 14–18)
IMM GRANULOCYTES # BLD AUTO: 0.06 K/UL (ref 0–0.11)
IMM GRANULOCYTES NFR BLD AUTO: 0.5 % (ref 0–0.9)
LIPASE SERPL-CCNC: 18 U/L (ref 11–82)
LYMPHOCYTES # BLD AUTO: 2.37 K/UL (ref 1–4.8)
LYMPHOCYTES NFR BLD: 19.7 % (ref 22–41)
MCH RBC QN AUTO: 31.3 PG (ref 27–33)
MCHC RBC AUTO-ENTMCNC: 33.7 G/DL (ref 33.7–35.3)
MCV RBC AUTO: 93.1 FL (ref 81.4–97.8)
MONOCYTES # BLD AUTO: 0.91 K/UL (ref 0–0.85)
MONOCYTES NFR BLD AUTO: 7.6 % (ref 0–13.4)
NEUTROPHILS # BLD AUTO: 8.56 K/UL (ref 1.82–7.42)
NEUTROPHILS NFR BLD: 71 % (ref 44–72)
NRBC # BLD AUTO: 0 K/UL
NRBC BLD-RTO: 0 /100 WBC
PLATELET # BLD AUTO: 327 K/UL (ref 164–446)
PMV BLD AUTO: 9.8 FL (ref 9–12.9)
POTASSIUM SERPL-SCNC: 3.6 MMOL/L (ref 3.6–5.5)
PROT SERPL-MCNC: 7.4 G/DL (ref 6–8.2)
RBC # BLD AUTO: 4.18 M/UL (ref 4.7–6.1)
SODIUM SERPL-SCNC: 139 MMOL/L (ref 135–145)
WBC # BLD AUTO: 12.1 K/UL (ref 4.8–10.8)

## 2018-10-13 PROCEDURE — 83690 ASSAY OF LIPASE: CPT

## 2018-10-13 PROCEDURE — 36415 COLL VENOUS BLD VENIPUNCTURE: CPT

## 2018-10-13 PROCEDURE — 85025 COMPLETE CBC W/AUTO DIFF WBC: CPT

## 2018-10-13 PROCEDURE — 700111 HCHG RX REV CODE 636 W/ 250 OVERRIDE (IP): Performed by: EMERGENCY MEDICINE

## 2018-10-13 PROCEDURE — 96376 TX/PRO/DX INJ SAME DRUG ADON: CPT

## 2018-10-13 PROCEDURE — 80053 COMPREHEN METABOLIC PANEL: CPT

## 2018-10-13 PROCEDURE — 99285 EMERGENCY DEPT VISIT HI MDM: CPT

## 2018-10-13 PROCEDURE — 96374 THER/PROPH/DIAG INJ IV PUSH: CPT

## 2018-10-13 RX ORDER — ONDANSETRON 4 MG/1
4 TABLET, ORALLY DISINTEGRATING ORAL EVERY 8 HOURS PRN
Qty: 10 TAB | Refills: 0 | Status: SHIPPED | OUTPATIENT
Start: 2018-10-13 | End: 2018-12-31

## 2018-10-13 RX ORDER — ONDANSETRON 2 MG/ML
4 INJECTION INTRAMUSCULAR; INTRAVENOUS ONCE
Status: COMPLETED | OUTPATIENT
Start: 2018-10-13 | End: 2018-10-13

## 2018-10-13 RX ADMIN — ONDANSETRON 4 MG: 2 INJECTION INTRAMUSCULAR; INTRAVENOUS at 22:35

## 2018-10-13 RX ADMIN — ONDANSETRON 4 MG: 2 INJECTION INTRAMUSCULAR; INTRAVENOUS at 23:26

## 2018-10-13 ASSESSMENT — LIFESTYLE VARIABLES: DO YOU DRINK ALCOHOL: NO

## 2018-10-13 ASSESSMENT — PAIN SCALES - GENERAL
PAINLEVEL_OUTOF10: 8
PAINLEVEL_OUTOF10: 8

## 2018-10-13 ASSESSMENT — PAIN DESCRIPTION - DESCRIPTORS: DESCRIPTORS: OTHER (COMMENT)

## 2018-10-14 VITALS
HEIGHT: 78 IN | HEART RATE: 75 BPM | RESPIRATION RATE: 17 BRPM | BODY MASS INDEX: 33.98 KG/M2 | TEMPERATURE: 97 F | SYSTOLIC BLOOD PRESSURE: 152 MMHG | WEIGHT: 293.65 LBS | DIASTOLIC BLOOD PRESSURE: 80 MMHG | OXYGEN SATURATION: 93 %

## 2018-10-14 NOTE — ED NOTES
D/C instructions provided to patient at bedside, verbalizes understanding and states plans for follow-up with PCP as recommended.   Scripts given.  IV removed, dressed.   All belongings accounted for, all questions answered at this time.   Pt ambulated to lobby without assistance. MTM information and facesheet given to patient.

## 2018-10-14 NOTE — ED TRIAGE NOTES
Pt ambulatory to triage c/o low abd pain/ nausea, vomiting x 3 and diarrhea x1 after eating a hamburger and nachos at a [employer] game tonight. VSS.  Educated on triage process, encouraged to inform staff of any changes.

## 2018-10-14 NOTE — ED PROVIDER NOTES
"ED Provider Note    CHIEF COMPLAINT  Chief Complaint   Patient presents with   • Abdominal Pain   • Nausea/Vomiting/Diarrhea       HPI  Norm Prabhakar is a 38 y.o. male who presents with \"upset stomach\" along with nausea, vomiting, diarrhea starting earlier this evening while he was attending a football game.  States that he ate nachos and a burger and about 30-40 minutes afterwards started to have upset stomach followed by nausea and vomiting x2 and diarrheal episodes x2.  Denies bloody emesis or stool.  No fevers.  Was in his usual state of health earlier today.  Did not share his food with others.  Attended the game with some friends who are not ill at this time.  Denies prior abdominal surgeries in the past.  No chest pain or trouble breathing.  No bloody stool or black stool.  No dysuria or hematuria.    REVIEW OF SYSTEMS  See HPI for further details. All other systems are negative.     PAST MEDICAL HISTORY   has a past medical history of Anxiety; ASTHMA; Bipolar 1 disorder ([facility]); Depression; Fall; Glaucoma; Glaucoma (1982); Hypothyroidism; Indigestion; Mental disorder; Murmur; Pneumonia; Psychiatric problem (2002); S/P thyroidectomy; Seizure (HCC) (2010); Seizure disorder ([facility]); and Unspecified disorder of thyroid.    SOCIAL HISTORY  Social History     Social History Main Topics   • Smoking status: Former Smoker     Packs/day: 0.25     Types: Cigarettes, Cigars     Quit date: 5/1/2018   • Smokeless tobacco: Never Used   • Alcohol use No   • Drug use: Yes     Types: Inhaled      Comment: marijuana   • Sexual activity: Not on file       SURGICAL HISTORY   has a past surgical history that includes eye surgery; thyroid lobectomy; and other.    CURRENT MEDICATIONS  Home Medications    **Home medications have not yet been reviewed for this encounter**         ALLERGIES  Allergies   Allergen Reactions   • Abilify Unspecified     \"Feeling tired, like I don't even know whats going on around me\"   • Fish      Pt " "reports fish causes him to be sick to his stomach         PHYSICAL EXAM  VITAL SIGNS: /80   Pulse 91   Temp 36.1 °C (97 °F) (Temporal)   Resp 16   Ht 1.981 m (6' 6\")   Wt (!) 133.2 kg (293 lb 10.4 oz)   SpO2 98%   BMI 33.93 kg/m²   Pulse ox interpretation: I interpret this pulse ox as normal.  Constitutional: Alert in no apparent distress.  HENT: No signs of trauma, Bilateral external ears normal, Nose normal.   Eyes: Pupils are equal and reactive, Conjunctiva normal, Non-icteric.   Neck: Normal range of motion, No tenderness, Supple, No stridor.   Cardiovascular: Regular rate and rhythm, no murmurs.   Thorax & Lungs: Normal breath sounds, No respiratory distress, No wheezing, No chest tenderness.   Abdomen: Bowel sounds normal, Soft, No tenderness, No masses, No pulsatile masses. No peritoneal signs.  Skin: Warm, Dry, No erythema, No rash.   Back: No bony tenderness, No CVA tenderness.   Extremities: Intact distal pulses, No edema, No tenderness, No cyanosis  Musculoskeletal: Good range of motion in all major joints. No tenderness to palpation or major deformities noted.   Neurologic: Alert , Normal motor function and gait, Normal sensory function, No focal deficits noted.       DIAGNOSTIC STUDIES / PROCEDURES    LABS  Labs Reviewed   CBC WITH DIFFERENTIAL - Abnormal; Notable for the following:        Result Value    WBC 12.1 (*)     RBC 4.18 (*)     Hemoglobin 13.1 (*)     Hematocrit 38.9 (*)     Lymphocytes 19.70 (*)     Neutrophils (Absolute) 8.56 (*)     Monos (Absolute) 0.91 (*)     All other components within normal limits   COMP METABOLIC PANEL - Abnormal; Notable for the following:     Glucose 105 (*)     All other components within normal limits   LIPASE   ESTIMATED GFR       RADIOLOGY  No orders to display       COURSE & MEDICAL DECISION MAKING  Pertinent Labs & Imaging studies reviewed. (See chart for details)  38 y.o. male presenting with nausea, vomiting, diarrhea prior to arrival.  No " "hematemesis or hematochezia.    Laboratory studies were performed.  Had leukocytosis of uncertain significance -likely demargination/reactive in nature.  No evidence of pancreatitis.  No focal abdominal tenderness.  No signs of an acute intra-abdominal surgical process such as cholelithiasis or appendicitis.  Patient has been resting comfortably here throughout his stay.  No further episodes of emesis or diarrhea.  Continues to have no focal abdominal tenderness.    Patient most likely with a gastroenteritis-like syndrome of a viral etiology.  May have had food poisoning from what he ate at the football game.  Recommending symptomatic management with Zofran and adequate oral hydration.  Able to tolerate oral intake here in the emergency department.  Discharged home in stable condition.    The patient will not drink alcohol nor drive with prescribed medications.   The patient will return for worsening symptoms or failure of improvement and is stable at the time of discharge. The patient verbalizes understanding in their own words.    /80   Pulse 75   Temp 36.1 °C (97 °F) (Temporal)   Resp 17   Ht 1.981 m (6' 6\")   Wt (!) 133.2 kg (293 lb 10.4 oz)   SpO2 93%   BMI 33.93 kg/m²     The patient was referred to primary care where they will receive further BP management.      [facility], Emergency Dept  [address] [address] [phone]    As needed, If symptoms worsen    Lori Monroe, A.P.R.N.  [address]  [place] [place] [id number] [phone]    Schedule an appointment as soon as possible for a visit         FINAL IMPRESSION  1. Nausea vomiting and diarrhea            Electronically signed by: Dorian Corona, 10/13/2018 10:24 PM    "

## 2018-10-14 NOTE — ED NOTES
Pt denies nausea following PO challenge. SW called for ride. Pt has MTM. RN to present patient with proper resources to call MTM.

## 2018-10-21 ENCOUNTER — HOSPITAL ENCOUNTER (EMERGENCY)
Facility: MEDICAL CENTER | Age: 38
End: 2018-10-21
Attending: EMERGENCY MEDICINE
Payer: MEDICAID

## 2018-10-21 VITALS
HEART RATE: 80 BPM | SYSTOLIC BLOOD PRESSURE: 121 MMHG | WEIGHT: 315 LBS | TEMPERATURE: 98.8 F | HEIGHT: 78 IN | BODY MASS INDEX: 36.45 KG/M2 | RESPIRATION RATE: 17 BRPM | DIASTOLIC BLOOD PRESSURE: 65 MMHG

## 2018-10-21 DIAGNOSIS — R19.7 VOMITING AND DIARRHEA: ICD-10-CM

## 2018-10-21 DIAGNOSIS — R10.13 EPIGASTRIC ABDOMINAL PAIN: ICD-10-CM

## 2018-10-21 DIAGNOSIS — R11.10 VOMITING AND DIARRHEA: ICD-10-CM

## 2018-10-21 LAB
ALBUMIN SERPL BCP-MCNC: 4.4 G/DL (ref 3.2–4.9)
ALBUMIN/GLOB SERPL: 1.8 G/DL
ALP SERPL-CCNC: 54 U/L (ref 30–99)
ALT SERPL-CCNC: 14 U/L (ref 2–50)
ANION GAP SERPL CALC-SCNC: 8 MMOL/L (ref 0–11.9)
AST SERPL-CCNC: 19 U/L (ref 12–45)
BASOPHILS # BLD AUTO: 0.7 % (ref 0–1.8)
BASOPHILS # BLD: 0.07 K/UL (ref 0–0.12)
BILIRUB SERPL-MCNC: 0.5 MG/DL (ref 0.1–1.5)
BUN SERPL-MCNC: 14 MG/DL (ref 8–22)
CALCIUM SERPL-MCNC: 9.9 MG/DL (ref 8.5–10.5)
CHLORIDE SERPL-SCNC: 104 MMOL/L (ref 96–112)
CO2 SERPL-SCNC: 24 MMOL/L (ref 20–33)
CREAT SERPL-MCNC: 1.12 MG/DL (ref 0.5–1.4)
EKG IMPRESSION: NORMAL
EOSINOPHIL # BLD AUTO: 0.14 K/UL (ref 0–0.51)
EOSINOPHIL NFR BLD: 1.4 % (ref 0–6.9)
ERYTHROCYTE [DISTWIDTH] IN BLOOD BY AUTOMATED COUNT: 43.1 FL (ref 35.9–50)
GLOBULIN SER CALC-MCNC: 2.5 G/DL (ref 1.9–3.5)
GLUCOSE SERPL-MCNC: 114 MG/DL (ref 65–99)
HCT VFR BLD AUTO: 34.8 % (ref 42–52)
HGB BLD-MCNC: 11.9 G/DL (ref 14–18)
IMM GRANULOCYTES # BLD AUTO: 0.07 K/UL (ref 0–0.11)
IMM GRANULOCYTES NFR BLD AUTO: 0.7 % (ref 0–0.9)
LIPASE SERPL-CCNC: 12 U/L (ref 11–82)
LYMPHOCYTES # BLD AUTO: 3.34 K/UL (ref 1–4.8)
LYMPHOCYTES NFR BLD: 34.1 % (ref 22–41)
MCH RBC QN AUTO: 31.6 PG (ref 27–33)
MCHC RBC AUTO-ENTMCNC: 34.2 G/DL (ref 33.7–35.3)
MCV RBC AUTO: 92.6 FL (ref 81.4–97.8)
MONOCYTES # BLD AUTO: 0.96 K/UL (ref 0–0.85)
MONOCYTES NFR BLD AUTO: 9.8 % (ref 0–13.4)
NEUTROPHILS # BLD AUTO: 5.22 K/UL (ref 1.82–7.42)
NEUTROPHILS NFR BLD: 53.3 % (ref 44–72)
NRBC # BLD AUTO: 0 K/UL
NRBC BLD-RTO: 0 /100 WBC
PLATELET # BLD AUTO: 326 K/UL (ref 164–446)
PMV BLD AUTO: 10 FL (ref 9–12.9)
POTASSIUM SERPL-SCNC: 3.6 MMOL/L (ref 3.6–5.5)
PROT SERPL-MCNC: 6.9 G/DL (ref 6–8.2)
RBC # BLD AUTO: 3.76 M/UL (ref 4.7–6.1)
SODIUM SERPL-SCNC: 136 MMOL/L (ref 135–145)
WBC # BLD AUTO: 9.8 K/UL (ref 4.8–10.8)

## 2018-10-21 PROCEDURE — 36415 COLL VENOUS BLD VENIPUNCTURE: CPT

## 2018-10-21 PROCEDURE — 96374 THER/PROPH/DIAG INJ IV PUSH: CPT

## 2018-10-21 PROCEDURE — 80053 COMPREHEN METABOLIC PANEL: CPT

## 2018-10-21 PROCEDURE — A9270 NON-COVERED ITEM OR SERVICE: HCPCS | Performed by: EMERGENCY MEDICINE

## 2018-10-21 PROCEDURE — 83690 ASSAY OF LIPASE: CPT

## 2018-10-21 PROCEDURE — 99285 EMERGENCY DEPT VISIT HI MDM: CPT

## 2018-10-21 PROCEDURE — 700102 HCHG RX REV CODE 250 W/ 637 OVERRIDE(OP): Performed by: EMERGENCY MEDICINE

## 2018-10-21 PROCEDURE — 85025 COMPLETE CBC W/AUTO DIFF WBC: CPT

## 2018-10-21 PROCEDURE — 93005 ELECTROCARDIOGRAM TRACING: CPT

## 2018-10-21 PROCEDURE — 93005 ELECTROCARDIOGRAM TRACING: CPT | Performed by: EMERGENCY MEDICINE

## 2018-10-21 PROCEDURE — 700111 HCHG RX REV CODE 636 W/ 250 OVERRIDE (IP): Performed by: EMERGENCY MEDICINE

## 2018-10-21 RX ORDER — ONDANSETRON 2 MG/ML
4 INJECTION INTRAMUSCULAR; INTRAVENOUS ONCE
Status: COMPLETED | OUTPATIENT
Start: 2018-10-21 | End: 2018-10-21

## 2018-10-21 RX ORDER — ONDANSETRON 4 MG/1
4 TABLET, ORALLY DISINTEGRATING ORAL EVERY 8 HOURS PRN
Qty: 10 TAB | Refills: 0 | Status: SHIPPED | OUTPATIENT
Start: 2018-10-21 | End: 2018-11-13

## 2018-10-21 RX ADMIN — ONDANSETRON 4 MG: 2 INJECTION INTRAMUSCULAR; INTRAVENOUS at 20:47

## 2018-10-21 RX ADMIN — LIDOCAINE HYDROCHLORIDE 30 ML: 20 SOLUTION OROPHARYNGEAL at 20:47

## 2018-10-21 ASSESSMENT — PAIN SCALES - GENERAL: PAINLEVEL_OUTOF10: 0

## 2018-10-22 NOTE — ED PROVIDER NOTES
ED Provider Note    CHIEF COMPLAINT  Chief Complaint   Patient presents with   • Dizziness       HPI  Norm Prabhakar is a 38 y.o. male who presents with complaints of feeling lightheaded.  This happened today while at work.  He states he has been sick over the past 24 hours with a vomiting and diarrhea.  Patient did not pass out.  He denies chest pain or shortness of breath.  Patient has intermittent abdominal cramping, currently in the epigastric region.  Patient has been seen in our hospital for similar complaints in the past.  He denies bloody stool, no hematemesis.  No neck stiffness, no sore throat, no cough.  Patient currently has persistent nausea    REVIEW OF SYSTEMS  Constitutional: No fever, complains of general malaise and dizziness  Respiratory: No shortness of breath  Cardiac: Near syncope, no chest pain  Gastrointestinal: Vomiting, diarrhea, abdominal cramping  Musculoskeletal: No acute neck or back pain  Eyes: Chronic vision problems  Endocrine: Hypothyroidism  Psychiatric: PTSD  Neurologic: No headache.  Seizure disorder.  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   • Hypothyroidism    • Indigestion     once in a while   • Mental disorder     learning disabilities; speech impairment; developmental delays   • Murmur     since birth   • Pneumonia     remote   • Psychiatric problem 2002    PTSD   • S/P thyroidectomy    • Seizure (HCC) 2010   • Seizure disorder (HCC)    • Unspecified disorder of thyroid        FAMILY HISTORY  Family History   Problem Relation Age of Onset   • Hypertension Mother    • Heart Disease Mother    • Lung Disease Mother    • Stroke Maternal Grandmother        SOCIAL HISTORY  Social History     Social History   •  "Marital status: Single     Spouse name: N/A   • Number of children: N/A   • Years of education: N/A     Social History Main Topics   • Smoking status: Former Smoker     Packs/day: 0.25     Types: Cigarettes, Cigars     Quit date: 5/1/2018   • Smokeless tobacco: Never Used   • Alcohol use No   • Drug use: Yes     Types: Inhaled      Comment: marijuana   • Sexual activity: Not on file     Other Topics Concern   • Not on file     Social History Narrative   • No narrative on file       SURGICAL HISTORY  Past Surgical History:   Procedure Laterality Date   • EYE SURGERY     • OTHER      Hernia Repair when he was 10 yrs old   • THYROID LOBECTOMY         CURRENT MEDICATIONS  Home Medications     Reviewed by Jacinta Santoro R.N. (Registered Nurse) on 10/21/18 at [employer]  Med List Status: Complete   Medication Last Dose Status   atorvastatin (LIPITOR) 80 MG tablet 10/20/2018 Active   busPIRone (BUSPAR) 10 MG Tab 10/21/2018 Active   Cholecalciferol (CVS D3) 2000 UNIT Cap 10/21/2018 Active   divalproex (DEPAKOTE) 500 MG Tablet Delayed Response 10/21/2018 Active   EPINEPHrine (EPIPEN 2-SONDRA) 0.3 MG/0.3ML Solution Auto-injector solution for injection as needed Active   glimepiride (AMARYL) 4 MG Tab 10/21/2018 Active   hydrocortisone 2.5 % Cream topical cream 10/21/2018 Active   hydrOXYzine pamoate (VISTARIL) 25 MG Cap 10/21/2018 Active   levETIRAcetam (KEPPRA) 500 MG Tab 10/21/2018 Active   levothyroxine (SYNTHROID) 125 MCG Tab 10/21/2018 Active   loratadine (CLARITIN) 10 MG Tab 10/21/2018 Active   metFORMIN (GLUCOPHAGE) 1000 MG tablet 10/21/2018 Active   mirtazapine (REMERON) 30 MG Tab tablet 10/20/2018 Active   naproxen (NAPROSYN) 500 MG Tab 10/21/2018 Active   ondansetron (ZOFRAN ODT) 4 MG TABLET DISPERSIBLE 10/21/2018 Active   prazosin (MINIPRESS) 1 MG Cap 10/20/2018 Active   predniSONE (DELTASONE) 20 MG Tab 10/21/2018 Active                ALLERGIES  Allergies   Allergen Reactions   • Abilify Unspecified     \"Feeling tired, " "like I don't even know whats going on around me\"   • Fish      Pt reports fish causes him to be sick to his stomach         PHYSICAL EXAM  VITAL SIGNS: /65   Pulse 80   Temp 37.1 °C (98.8 °F)   Resp 17   Ht 1.981 m (6' 6\")   Wt (!) 150 kg (330 lb 11 oz)   BMI 38.22 kg/m²   Constitutional: Well-nourished.  ENT: Nares clear, mucous membranes slightly dry.  Eyes:  Conjunctiva normal, No discharge.    Lymphatic: No adenopathy.   Cardiovascular: Normal heart rate, Normal rhythm.   Pulmonary: No wheezing, no rales.  Good air movement bilateral  Gastrointestinal: Soft, mild epigastric tenderness without guarding.  No distention.  Bowel sounds are active.  No pain over McBurney's point  Skin: Warm, Dry, No erythema, No rash.   Musculoskeletal:  No CVA tenderness.   Neurologic:  Normal motor and sensory function, poor vision which is chronic according to the patient  Psychiatric:Normal affect, Normal mood.  Patient is calm and cooperative    RADIOLOGY/PROCEDURES/Labs  Results for orders placed or performed during the hospital encounter of 10/21/18   CBC WITH DIFFERENTIAL   Result Value Ref Range    WBC 9.8 4.8 - 10.8 K/uL    RBC 3.76 (L) 4.70 - 6.10 M/uL    Hemoglobin 11.9 (L) 14.0 - 18.0 g/dL    Hematocrit 34.8 (L) 42.0 - 52.0 %    MCV 92.6 81.4 - 97.8 fL    MCH 31.6 27.0 - 33.0 pg    MCHC 34.2 33.7 - 35.3 g/dL    RDW 43.1 35.9 - 50.0 fL    Platelet Count 326 164 - 446 K/uL    MPV 10.0 9.0 - 12.9 fL    Neutrophils-Polys 53.30 44.00 - 72.00 %    Lymphocytes 34.10 22.00 - 41.00 %    Monocytes 9.80 0.00 - 13.40 %    Eosinophils 1.40 0.00 - 6.90 %    Basophils 0.70 0.00 - 1.80 %    Immature Granulocytes 0.70 0.00 - 0.90 %    Nucleated RBC 0.00 /100 WBC    Neutrophils (Absolute) 5.22 1.82 - 7.42 K/uL    Lymphs (Absolute) 3.34 1.00 - 4.80 K/uL    Monos (Absolute) 0.96 (H) 0.00 - 0.85 K/uL    Eos (Absolute) 0.14 0.00 - 0.51 K/uL    Baso (Absolute) 0.07 0.00 - 0.12 K/uL    Immature Granulocytes (abs) 0.07 0.00 - 0.11 " K/uL    NRBC (Absolute) 0.00 K/uL   COMP METABOLIC PANEL   Result Value Ref Range    Sodium 136 135 - 145 mmol/L    Potassium 3.6 3.6 - 5.5 mmol/L    Chloride 104 96 - 112 mmol/L    Co2 24 20 - 33 mmol/L    Anion Gap 8.0 0.0 - 11.9    Glucose 114 (H) 65 - 99 mg/dL    Bun 14 8 - 22 mg/dL    Creatinine 1.12 0.50 - 1.40 mg/dL    Calcium 9.9 8.5 - 10.5 mg/dL    AST(SGOT) 19 12 - 45 U/L    ALT(SGPT) 14 2 - 50 U/L    Alkaline Phosphatase 54 30 - 99 U/L    Total Bilirubin 0.5 0.1 - 1.5 mg/dL    Albumin 4.4 3.2 - 4.9 g/dL    Total Protein 6.9 6.0 - 8.2 g/dL    Globulin 2.5 1.9 - 3.5 g/dL    A-G Ratio 1.8 g/dL   LIPASE   Result Value Ref Range    Lipase 12 11 - 82 U/L   ESTIMATED GFR   Result Value Ref Range    GFR If African American >60 >60 mL/min/1.73 m 2    GFR If Non African American >60 >60 mL/min/1.73 m 2   EKG   Result Value Ref Range    Report       [facility] Emergency Dept.    Test Date:  2018-10-21  Pt Name:    HOLLY KIM                 Department: ER  MRN:        [id number]                      Room:       RD 03  Gender:     Male                         Technician: [id number]  :        1980                   Requested By:ER TRIAGE PROTOCOL  Order #:    [id number]                    Reading MD:    Measurements  Intervals                                Axis  Rate:       83                           P:          43  PA:         180                          QRS:        8  QRSD:       118                          T:          11  QT:         368  QTc:        433    Interpretive Statements  SINUS RHYTHM  NONSPECIFIC INTRAVENTRICULAR CONDUCTION DELAY  Compared to ECG 2018 00:16:57  No significant changes           COURSE & MEDICAL DECISION MAKING  Pertinent Labs & Imaging studies reviewed. (See chart for details)  Patient with 24 hours of vomiting and diarrhea, possibly early dehydration however after Zofran, nausea improved and was able to tolerate drinking a GI cocktail.  He did have some  improvement of the discomfort in his abdomen.  Differential diagnosis includes viral gastroenteritis, food poisoning, GERD or peptic ulcer disease.  Patient is currently well-appearing with normal vital signs and stable for discharge.  He has agreed to return if worse.  I have asked him to see  if not better in 1 week.    FINAL IMPRESSION  1. Vomiting and diarrhea    2. Epigastric abdominal pain            Electronically signed by: Dorian Boss, 10/21/2018 9:21 PM

## 2018-10-22 NOTE — ED TRIAGE NOTES
"Patient to ED via [facility] c/o dizziness that began at 1500. Patient states he began feeling tunnel vision, dizzy/lightheaded and slightly nauseated at work while he was moving around some equipment. Patient went home early and states he is still feeling dizzy. Denies any visual changes, cp, or n/v. Has a hx of sz and is unsure if he had a seizure this morning or not. States they \"come out of nowhere\"  "

## 2018-10-23 ENCOUNTER — APPOINTMENT (OUTPATIENT)
Dept: RADIOLOGY | Facility: MEDICAL CENTER | Age: 38
End: 2018-10-23
Attending: EMERGENCY MEDICINE
Payer: MEDICAID

## 2018-10-23 ENCOUNTER — HOSPITAL ENCOUNTER (EMERGENCY)
Facility: MEDICAL CENTER | Age: 38
End: 2018-10-23
Attending: EMERGENCY MEDICINE
Payer: MEDICAID

## 2018-10-23 VITALS
WEIGHT: 297.4 LBS | SYSTOLIC BLOOD PRESSURE: 124 MMHG | BODY MASS INDEX: 34.41 KG/M2 | HEART RATE: 84 BPM | RESPIRATION RATE: 19 BRPM | TEMPERATURE: 98 F | OXYGEN SATURATION: 95 % | DIASTOLIC BLOOD PRESSURE: 88 MMHG | HEIGHT: 78 IN

## 2018-10-23 DIAGNOSIS — S00.03XA CONTUSION OF OCCIPITAL REGION OF SCALP, INITIAL ENCOUNTER: ICD-10-CM

## 2018-10-23 DIAGNOSIS — S16.1XXA STRAIN OF NECK MUSCLE, INITIAL ENCOUNTER: ICD-10-CM

## 2018-10-23 PROCEDURE — 99284 EMERGENCY DEPT VISIT MOD MDM: CPT

## 2018-10-23 PROCEDURE — 72125 CT NECK SPINE W/O DYE: CPT

## 2018-10-23 PROCEDURE — A9270 NON-COVERED ITEM OR SERVICE: HCPCS | Performed by: EMERGENCY MEDICINE

## 2018-10-23 PROCEDURE — 96372 THER/PROPH/DIAG INJ SC/IM: CPT

## 2018-10-23 PROCEDURE — 70450 CT HEAD/BRAIN W/O DYE: CPT

## 2018-10-23 PROCEDURE — 700102 HCHG RX REV CODE 250 W/ 637 OVERRIDE(OP): Performed by: EMERGENCY MEDICINE

## 2018-10-23 PROCEDURE — 700111 HCHG RX REV CODE 636 W/ 250 OVERRIDE (IP): Performed by: EMERGENCY MEDICINE

## 2018-10-23 RX ORDER — ACETAMINOPHEN 325 MG/1
650 TABLET ORAL ONCE
Status: COMPLETED | OUTPATIENT
Start: 2018-10-23 | End: 2018-10-23

## 2018-10-23 RX ORDER — KETOROLAC TROMETHAMINE 30 MG/ML
15 INJECTION, SOLUTION INTRAMUSCULAR; INTRAVENOUS ONCE
Status: COMPLETED | OUTPATIENT
Start: 2018-10-23 | End: 2018-10-23

## 2018-10-23 RX ADMIN — ACETAMINOPHEN 650 MG: 325 TABLET, FILM COATED ORAL at 20:07

## 2018-10-23 RX ADMIN — KETOROLAC TROMETHAMINE 15 MG: 30 INJECTION, SOLUTION INTRAMUSCULAR at 20:07

## 2018-10-23 ASSESSMENT — ENCOUNTER SYMPTOMS
SORE THROAT: 0
TINGLING: 1
SHORTNESS OF BREATH: 0
ABDOMINAL PAIN: 0
LOSS OF CONSCIOUSNESS: 0
NECK PAIN: 1
NAUSEA: 0
VOMITING: 0
FALLS: 1
ROS SKIN COMMENTS: NO WOUNDS
COUGH: 0

## 2018-10-24 NOTE — ED TRIAGE NOTES
Chief Complaint   Patient presents with   • GLF   • Back Pain       Patient was playing basketball with his friends and tripped over his friend that was behind him. Patient states the he fell and hit his head, back, and neck. Patient states that is was the center of his head.     Patient placed back out in lobby and educated on triage process.

## 2018-10-24 NOTE — ED NOTES
PT reports feeling better. Denies any adverse symptoms from medications.    Pt discharged, discharge instructions given. Verbalizes understanding. VSS on RA. All belongings accounted for. Pt ambulated with steady gait to ED lobby.

## 2018-10-24 NOTE — ED PROVIDER NOTES
ED Provider Note    Scribed for Adrian Huizar II, M.D. by Jasen Lunsford. 10/23/2018  6:08 PM    Means of Arrival: Walk-in  History obtained by: Patient  Limitations: None    CHIEF COMPLAINT  Chief Complaint   Patient presents with   • GLF   • Back Pain       HPI  Norm Prabhakar is a 38 y.o. male who presents to the Emergency Department for evaluation status post ground level fall onset an hour ago. He states he was playing basketball with his friends today when he tripped over his friend and fell backwards on the ground. He developed neck pain and posterior head immediately. He did not lose any consciousness, but reports of vision changes afterwards. He is unable to move his neck due to worsened pain. He notes of tingling to his hands that lasted a few seconds after fall.     REVIEW OF SYSTEMS  Review of Systems   HENT: Negative for congestion, hearing loss, sore throat and tinnitus.    Eyes:        Vision changes   Respiratory: Negative for cough and shortness of breath.    Cardiovascular: Negative for chest pain.   Gastrointestinal: Negative for abdominal pain, nausea and vomiting.   Musculoskeletal: Positive for falls and neck pain. Negative for joint pain.   Skin:        No wounds     Neurological: Positive for tingling (Bilateral hands). Negative for loss of consciousness.   All other systems reviewed and are negative.  See HPI for further details.    PAST MEDICAL HISTORY   has a past medical history of Anxiety; ASTHMA; Bipolar 1 disorder (HCC); Depression; Fall; Glaucoma; Glaucoma (1982); Hypothyroidism; Indigestion; Mental disorder; Murmur; Pneumonia; Psychiatric problem (2002); S/P thyroidectomy; Seizure (HCC) (2010); Seizure disorder ([facility]); and Unspecified disorder of thyroid.    SOCIAL HISTORY  Social History     Social History Main Topics   • Smoking status: Former Smoker     Packs/day: 0.25     Types: Cigarettes, Cigars     Quit date: 5/1/2018   • Smokeless tobacco: Never Used   • Alcohol use No   •  Drug use: Yes     Types: Inhaled      Comment: marijuana       SURGICAL HISTORY   has a past surgical history that includes eye surgery; thyroid lobectomy; and other.    CURRENT MEDICATIONS  No current facility-administered medications for this encounter.     Current Outpatient Prescriptions:   •  ondansetron (ZOFRAN ODT) 4 MG TABLET DISPERSIBLE, Take 1 Tab by mouth every 8 hours as needed., Disp: 10 Tab, Rfl: 0  •  ondansetron (ZOFRAN ODT) 4 MG TABLET DISPERSIBLE, Take 1 Tab by mouth every 8 hours as needed for Nausea., Disp: 10 Tab, Rfl: 0  •  naproxen (NAPROSYN) 500 MG Tab, Take 1 Tab by mouth 2 times a day, with meals., Disp: 60 Tab, Rfl: 0  •  hydrocortisone 2.5 % Cream topical cream, Apply 1 Application to affected area(s) 2 times a day., Disp: 1 Tube, Rfl: 0  •  loratadine (CLARITIN) 10 MG Tab, Take 1 Tab by mouth every day., Disp: 30 Tab, Rfl: 1  •  predniSONE (DELTASONE) 20 MG Tab, Day 1-4:  Take 40 mg by mouth daily., Disp: 8 Tab, Rfl: 0  •  EPINEPHrine (EPIPEN 2-SONDRA) 0.3 MG/0.3ML Solution Auto-injector solution for injection, 0.3 mL by Intramuscular route as needed (Allergic reaction)., Disp: 0.3 mL, Rfl: 3  •  levETIRAcetam (KEPPRA) 500 MG Tab, Take 1 Tab by mouth 2 times a day., Disp: 60 Tab, Rfl: 3  •  busPIRone (BUSPAR) 10 MG Tab, Take 10 mg by mouth 3 times a day., Disp: , Rfl:   •  prazosin (MINIPRESS) 1 MG Cap, Take 1 mg by mouth every evening., Disp: , Rfl:   •  mirtazapine (REMERON) 30 MG Tab tablet, Take 15-30 mg by mouth every evening., Disp: , Rfl:   •  hydrOXYzine pamoate (VISTARIL) 25 MG Cap, Take 25 mg by mouth 3 times a day as needed for Anxiety., Disp: , Rfl:   •  glimepiride (AMARYL) 4 MG Tab, Take 4 mg by mouth every morning., Disp: , Rfl:   •  atorvastatin (LIPITOR) 80 MG tablet, Take 80 mg by mouth every evening., Disp: , Rfl:   •  Cholecalciferol (CVS D3) 2000 UNIT Cap, Take 4,000 Units by mouth every bedtime., Disp: , Rfl:   •  metFORMIN (GLUCOPHAGE) 1000 MG tablet, Take 1 Tab by  "mouth 2 times a day, with meals., Disp: 60 Tab, Rfl: 2  •  divalproex (DEPAKOTE) 500 MG Tablet Delayed Response, Take 500-1,500 mg by mouth 2 Times a Day. 500 mg AM 1500 mg PM, Disp: , Rfl:   •  levothyroxine (SYNTHROID) 125 MCG Tab, Take 1 Tab by mouth Every morning on an empty stomach., Disp: 30 Tab, Rfl: 0    ALLERGIES  Allergies   Allergen Reactions   • Abilify Unspecified     \"Feeling tired, like I don't even know whats going on around me\"   • Fish      Pt reports fish causes him to be sick to his stomach         PHYSICAL EXAM  VITAL SIGNS: /87   Pulse 84   Temp 36.8 °C (98.2 °F)   Resp 17   Ht 1.981 m (6' 6\")   Wt (!) 134.9 kg (297 lb 6.4 oz)   SpO2 96%   BMI 34.37 kg/m²      Pulse ox interpretation: I interpret this pulse ox as normal.  Constitutional: Alert. Well appearing  male.   HENT: Contusion/hematoma to the back of superior neck and occipital scalp. Bilateral external ears normal, Nose normal.  No hemotympanum.   Eyes: Pupils are equal, Conjunctiva normal, Non-icteric.   Neck: Midline upper cervical spine tenderness with pain with rotating neck in either direction.  Cardiovascular: Regular rate and rhythm, no murmurs. Symmetric distal pulses. No cyanosis of extremities. No peripheral edema of extremities.  Thorax & Lungs: Normal breath sounds, No respiratory distress, No wheezing, No chest tenderness.   Abdomen: Bowel sounds normal, Soft, No tenderness, No masses, No pulsatile masses. No peritoneal signs.  Skin: Warm, Dry.   Back: No midline bony tenderness, No CVA tenderness.   Musculoskeletal: Good range of motion in all major joints. No tenderness to palpation or major deformities noted. 5/5 strength to bilateral upper extremities. Equal radial pulses. No sensory deficits.   Neurologic: Alert , Normal motor function, Normal sensory function, No focal deficits noted.   Psychiatric: Affect normal, Judgment normal, Mood normal.     DIAGNOSTIC STUDIES / " PROCEDURES    LABS  Pertinent Labs & Imaging studies reviewed. (See chart for details)    RADIOLOGY  CT-HEAD W/O   Final Result         1. No acute intracranial findings. No evidence of acute intracranial hemorrhage or mass lesion.      2. Extensive white matter lucencies are unchanged.               CT-CSPINE WITHOUT PLUS RECONS   Final Result         Negative CT scan of the cervical spine.  No fracture or subluxation.      Pertinent Labs & Imaging studies reviewed. (See chart for details)    COURSE & MEDICAL DECISION MAKING  Pertinent Labs & Imaging studies reviewed. (See chart for details)    6:08 PM This is a 38 y.o. male who presents with neck pain and back pain status post ground level fall and the differential diagnosis includes but is not limited to intracranial hemorrhoid, cervical spine fracture, cervical strain, concussion. Ordered for Ct head and CT-Cspine to evaluate. He will be placed in a C-collar.     7:36 PM  CT head and cervical spine did not reveal any acute injuries.  I removed cervical collar and given him Motrin, Tylenol for pain.  Plan for discharge.  His neurologic exam remains normal. He was agreeable with discharge plan for OTC pain medication. Likely will have some discomfort for a few days.       The patient will return for worsening symptoms and is stable at the time of discharge. The patient verbalizes understanding and will comply.      FINAL IMPRESSION  1. Strain of neck muscle, initial encounter    2. Contusion of occipital region of scalp, initial encounter          Jasen ROB (Stephanie), am scribing for, and in the presence of, Adrian Huizar II, M.D..    Electronically signed by: Jasen Lunsford (Stephanie), 10/23/2018    Adrian ROB II, M.D. personally performed the services described in this documentation, as scribed by Jasen Lunsford in my presence, and it is both accurate and complete. C.     The note accurately reflects work and decisions made by me.  Adrian Huizar  II  10/23/2018  7:37 PM

## 2018-10-24 NOTE — DISCHARGE INSTRUCTIONS
For pain take motrin 400mg every 6 hours for pain. You can also take tylenol 650mg every 6 hours for pain.

## 2018-10-27 ENCOUNTER — HOSPITAL ENCOUNTER (EMERGENCY)
Facility: MEDICAL CENTER | Age: 38
End: 2018-10-27
Attending: EMERGENCY MEDICINE
Payer: MEDICAID

## 2018-10-27 VITALS
HEIGHT: 78 IN | WEIGHT: 296.3 LBS | BODY MASS INDEX: 34.28 KG/M2 | TEMPERATURE: 97.5 F | HEART RATE: 69 BPM | RESPIRATION RATE: 16 BRPM | SYSTOLIC BLOOD PRESSURE: 124 MMHG | OXYGEN SATURATION: 98 % | DIASTOLIC BLOOD PRESSURE: 82 MMHG

## 2018-10-27 DIAGNOSIS — S44.92XD NEUROPRAXIA OF UPPER EXTREMITY, LEFT, SUBSEQUENT ENCOUNTER: ICD-10-CM

## 2018-10-27 LAB
ANION GAP SERPL CALC-SCNC: 11 MMOL/L (ref 0–11.9)
BUN SERPL-MCNC: 17 MG/DL (ref 8–22)
CALCIUM SERPL-MCNC: 9.7 MG/DL (ref 8.5–10.5)
CHLORIDE SERPL-SCNC: 105 MMOL/L (ref 96–112)
CK SERPL-CCNC: 197 U/L (ref 0–154)
CO2 SERPL-SCNC: 23 MMOL/L (ref 20–33)
CREAT SERPL-MCNC: 0.93 MG/DL (ref 0.5–1.4)
GLUCOSE SERPL-MCNC: 141 MG/DL (ref 65–99)
POTASSIUM SERPL-SCNC: 3.9 MMOL/L (ref 3.6–5.5)
SODIUM SERPL-SCNC: 139 MMOL/L (ref 135–145)

## 2018-10-27 PROCEDURE — 80048 BASIC METABOLIC PNL TOTAL CA: CPT

## 2018-10-27 PROCEDURE — 99284 EMERGENCY DEPT VISIT MOD MDM: CPT

## 2018-10-27 PROCEDURE — 82550 ASSAY OF CK (CPK): CPT

## 2018-10-27 PROCEDURE — 36415 COLL VENOUS BLD VENIPUNCTURE: CPT

## 2018-10-27 ASSESSMENT — ENCOUNTER SYMPTOMS: FEVER: 0

## 2018-10-27 ASSESSMENT — LIFESTYLE VARIABLES: DO YOU DRINK ALCOHOL: NO

## 2018-10-27 NOTE — ED TRIAGE NOTES
.  Chief Complaint   Patient presents with   • Wrist Injury     right wrist   Agree with triage assessment.  + erythema/edema noted right wrist.  Pt reports RUE distal numbness.  + right radial pulse palpated.  Pt left handed.

## 2018-10-27 NOTE — ED PROVIDER NOTES
ED Provider Note    Scribed for Hamlet Dean M.D. by Nabil Cook. 10/27/2018, 2:36 PM.    Primary care provider: ANIKET Chung  Means of arrival: Walk in  History obtained from: Patient  History limited by: None    CHIEF COMPLAINT  Chief Complaint   Patient presents with   • Wrist Injury     right wrist       HPI  Norm Prabhakar is a 38 y.o. male who presents to the Emergency Department for evaluation of constant right hand and wrist numbness onset 13 hours ago. The patient confirms right hand pain and weakness. He denies any fevers. No exacerbating or alleviating factors noted. The patient and his brother were wrestling and his brother put a hand cuff on him 13 hours ago. It was too tight and he did not have a key. He went to [employer] and they cut it off 1 hour ago. He confirms a history of asthma, seizures, and diabetes.    REVIEW OF SYSTEMS  Review of Systems   Constitutional: Negative for fever.   Neurological:        Hand and wrist numbness  Hand pain   All other systems reviewed and are negative.      PAST MEDICAL HISTORY   has a past medical history of Anxiety; ASTHMA; Bipolar 1 disorder (HCC); Depression; Fall; Glaucoma; Glaucoma (1982); Hypothyroidism; Indigestion; Mental disorder; Murmur; Pneumonia; Psychiatric problem (2002); S/P thyroidectomy; Seizure (HCC) (2010); Seizure disorder ([facility]); and Unspecified disorder of thyroid.    SURGICAL HISTORY   has a past surgical history that includes eye surgery; thyroid lobectomy; and other.    SOCIAL HISTORY  Social History   Substance Use Topics   • Smoking status: Current Every Day Smoker     Packs/day: 0.25     Types: Cigarettes, Cigars     Last attempt to quit: 5/1/2018   • Smokeless tobacco: Never Used   • Alcohol use No      History   Drug Use   • Types: Inhaled     Comment: marijuana       FAMILY HISTORY  Family History   Problem Relation Age of Onset   • Hypertension Mother    • Heart Disease Mother    • Lung Disease Mother    •  "Stroke Maternal Grandmother        CURRENT MEDICATIONS  Home Medications     Reviewed by Milagro Ambrose R.N. (Registered Nurse) on 10/27/18 at 1426  Med List Status: Partial   Medication Last Dose Status   atorvastatin (LIPITOR) 80 MG tablet 10/23/2018 Active   busPIRone (BUSPAR) 10 MG Tab 10/21/2018 Active   Cholecalciferol (CVS D3) 2000 UNIT Cap 10/21/2018 Active   divalproex (DEPAKOTE) 500 MG Tablet Delayed Response 10/21/2018 Active   EPINEPHrine (EPIPEN 2-SONDRA) 0.3 MG/0.3ML Solution Auto-injector solution for injection as needed Active   glimepiride (AMARYL) 4 MG Tab 10/21/2018 Active   hydrocortisone 2.5 % Cream topical cream 10/21/2018 Active   hydrOXYzine pamoate (VISTARIL) 25 MG Cap 10/21/2018 Active   levETIRAcetam (KEPPRA) 500 MG Tab 10/21/2018 Active   levothyroxine (SYNTHROID) 125 MCG Tab 10/21/2018 Active   loratadine (CLARITIN) 10 MG Tab 10/21/2018 Active   metFORMIN (GLUCOPHAGE) 1000 MG tablet 10/23/2018 Active   mirtazapine (REMERON) 30 MG Tab tablet 10/20/2018 Active   naproxen (NAPROSYN) 500 MG Tab 10/21/2018 Active   ondansetron (ZOFRAN ODT) 4 MG TABLET DISPERSIBLE 10/21/2018 Active   ondansetron (ZOFRAN ODT) 4 MG TABLET DISPERSIBLE  Active   prazosin (MINIPRESS) 1 MG Cap 10/20/2018 Active   predniSONE (DELTASONE) 20 MG Tab 10/21/2018 Active                ALLERGIES  Allergies   Allergen Reactions   • Abilify Unspecified     \"Feeling tired, like I don't even know whats going on around me\"   • Fish      Pt reports fish causes him to be sick to his stomach         PHYSICAL EXAM  VITAL SIGNS: /82   Pulse 87   Temp 36.3 °C (97.4 °F)   Resp 16   Ht 1.981 m (6' 6\")   Wt (!) 134.4 kg (296 lb 4.8 oz)   SpO2 96%   BMI 34.24 kg/m²     Constitutional:  In mild distress  HENT:  Moist mucous membranes  Eyes:  No conjunctivitis or icterus  Neck:  trachea is midline, no palpable thyroid  Cardiovascular:  Regular rate and rhythm, no murmurs.   Thorax & Lungs:  Normal breath sounds, no " rhonchi  Abdomen:  Soft, Non-tender  Skin:.  no rash  Back:  Non-tender, no CVA tenderness  Extremities:  Swelling of the right hand, no palpable tenderness  Vascular: symmetric radial pulse. Good capillary refill  Neurologic: Minimal motor and sensation    LABS  Labs Reviewed   BASIC METABOLIC PANEL - Abnormal; Notable for the following:        Result Value    Glucose 141 (*)     All other components within normal limits   CREATINE KINASE - Abnormal; Notable for the following:     CPK Total 197 (*)     All other components within normal limits   ESTIMATED GFR     All labs reviewed by me.    COURSE & MEDICAL DECISION MAKING  Pertinent Labs & Imaging studies reviewed. (See chart for details)    2:36 PM - Patient seen and examined at bedside. Ordered estimated GFR, creatine kinase and BMP to evaluate his symptoms. The differential diagnoses include but are not limited to: neurapraxia vs rhabdomyolysis. I informed he patient that I will page a hand specialist.    3:39 PM Hand Orthopedist paged.    4:12 PM I reevaluated the patient and his hand was found to be non-tender with full ROM. I informed him of his labs and that I am still waiting for orthopedics to respond.    4:23 PM Dr. Perez, Hand orthopedics, consulted and would like to have the patient follow up in clinic early this week.    4:24 PM I informed the patient that he will need to follow up with Dr. Perez, Hand Orthopedics for further evaluation. Should he develop new or worsening symptoms, then he should return for evaluation. The patient understands and agrees to discharge home.        The patient will return for new or worsening symptoms and is stable at the time of discharge.    DISPOSITION:  Patient will be discharged home in stable condition.    FOLLOW UP:  [facility]  [place] [address]  [phone]    In 1 day        OUTPATIENT MEDICATIONS:  Discharge Medication List as of 10/27/2018  4:42 PM            FINAL IMPRESSION  1. Neuropraxia of  upper extremity, left, subsequent encounter         INabil (Scribe), am scribing for, and in the presence of, Hamlet Dean M.D.     Electronically signed by: Nabil Cook (Scribe), 10/27/2018     IHamlet M.D. personally performed the services described in this documentation, as scribed by Nabil Cook in my presence, and it is both accurate and complete. C    The note accurately reflects work and decisions made by me.  Hamlet Dean  10/27/2018  6:50 PM

## 2018-10-27 NOTE — ED NOTES
Given discharge instructions, verbalized understanding, left with all belongings, ambulates to ED lobby.

## 2018-10-27 NOTE — DISCHARGE INSTRUCTIONS
You have injured the nerves to your hand.  We are placing you in a splint today.  It is very important that you contact the renal orthopedic clinic tomorrow to be seen early this week.  He will be reassessed and may be consider for possible procedure.

## 2018-10-27 NOTE — ED TRIAGE NOTES
Chief Complaint   Patient presents with   • Wrist Injury     right wrist     Pt bib ems, per pt, his brother placed handcuff last night on his right hand but does not have the key (they could not afford to by the key). He had handcuff for 12hours, he went to fire department and was able to remove .   Pt now having numbness entire hand and decrease rom.  2+radial pulse.

## 2018-10-28 ENCOUNTER — PATIENT OUTREACH (OUTPATIENT)
Dept: HEALTH INFORMATION MANAGEMENT | Facility: OTHER | Age: 38
End: 2018-10-28

## 2018-10-28 NOTE — ED NOTES
All lines and monitors disconnected. Discharge instructions reviewed, questions answered.  Pt to viral, escorted by RN.  Pt verbalizes understanding he is not to return to work until cleared by orthopaedic's(follow up on Monday, October 29).  Pt states all belongings in possession.

## 2018-11-02 ENCOUNTER — HOSPITAL ENCOUNTER (EMERGENCY)
Facility: MEDICAL CENTER | Age: 38
End: 2018-11-03
Attending: EMERGENCY MEDICINE
Payer: MEDICAID

## 2018-11-02 DIAGNOSIS — G40.909 SEIZURE DISORDER (HCC): ICD-10-CM

## 2018-11-02 DIAGNOSIS — R51.9 ACUTE NONINTRACTABLE HEADACHE, UNSPECIFIED HEADACHE TYPE: ICD-10-CM

## 2018-11-02 LAB
ALBUMIN SERPL BCP-MCNC: 4.7 G/DL (ref 3.2–4.9)
ALBUMIN/GLOB SERPL: 2.2 G/DL
ALP SERPL-CCNC: 58 U/L (ref 30–99)
ALT SERPL-CCNC: 11 U/L (ref 2–50)
ANION GAP SERPL CALC-SCNC: 13 MMOL/L (ref 0–11.9)
AST SERPL-CCNC: 16 U/L (ref 12–45)
BASOPHILS # BLD AUTO: 0.8 % (ref 0–1.8)
BASOPHILS # BLD: 0.07 K/UL (ref 0–0.12)
BILIRUB SERPL-MCNC: 0.4 MG/DL (ref 0.1–1.5)
BUN SERPL-MCNC: 15 MG/DL (ref 8–22)
CALCIUM SERPL-MCNC: 9.2 MG/DL (ref 8.5–10.5)
CHLORIDE SERPL-SCNC: 102 MMOL/L (ref 96–112)
CO2 SERPL-SCNC: 23 MMOL/L (ref 20–33)
CREAT SERPL-MCNC: 0.89 MG/DL (ref 0.5–1.4)
EOSINOPHIL # BLD AUTO: 0.14 K/UL (ref 0–0.51)
EOSINOPHIL NFR BLD: 1.6 % (ref 0–6.9)
ERYTHROCYTE [DISTWIDTH] IN BLOOD BY AUTOMATED COUNT: 43.8 FL (ref 35.9–50)
GLOBULIN SER CALC-MCNC: 2.1 G/DL (ref 1.9–3.5)
GLUCOSE SERPL-MCNC: 255 MG/DL (ref 65–99)
HCT VFR BLD AUTO: 39.7 % (ref 42–52)
HGB BLD-MCNC: 13.5 G/DL (ref 14–18)
IMM GRANULOCYTES # BLD AUTO: 0.04 K/UL (ref 0–0.11)
IMM GRANULOCYTES NFR BLD AUTO: 0.4 % (ref 0–0.9)
LYMPHOCYTES # BLD AUTO: 3.53 K/UL (ref 1–4.8)
LYMPHOCYTES NFR BLD: 39.6 % (ref 22–41)
MCH RBC QN AUTO: 31.6 PG (ref 27–33)
MCHC RBC AUTO-ENTMCNC: 34 G/DL (ref 33.7–35.3)
MCV RBC AUTO: 93 FL (ref 81.4–97.8)
MONOCYTES # BLD AUTO: 0.82 K/UL (ref 0–0.85)
MONOCYTES NFR BLD AUTO: 9.2 % (ref 0–13.4)
NEUTROPHILS # BLD AUTO: 4.31 K/UL (ref 1.82–7.42)
NEUTROPHILS NFR BLD: 48.4 % (ref 44–72)
NRBC # BLD AUTO: 0 K/UL
NRBC BLD-RTO: 0 /100 WBC
PLATELET # BLD AUTO: 366 K/UL (ref 164–446)
PMV BLD AUTO: 10 FL (ref 9–12.9)
POTASSIUM SERPL-SCNC: 4 MMOL/L (ref 3.6–5.5)
PROT SERPL-MCNC: 6.8 G/DL (ref 6–8.2)
RBC # BLD AUTO: 4.27 M/UL (ref 4.7–6.1)
SODIUM SERPL-SCNC: 138 MMOL/L (ref 135–145)
VALPROATE SERPL-MCNC: 20.5 UG/ML (ref 50–100)
WBC # BLD AUTO: 8.9 K/UL (ref 4.8–10.8)

## 2018-11-02 PROCEDURE — 80164 ASSAY DIPROPYLACETIC ACD TOT: CPT

## 2018-11-02 PROCEDURE — 700111 HCHG RX REV CODE 636 W/ 250 OVERRIDE (IP): Performed by: EMERGENCY MEDICINE

## 2018-11-02 PROCEDURE — 96375 TX/PRO/DX INJ NEW DRUG ADDON: CPT

## 2018-11-02 PROCEDURE — 700105 HCHG RX REV CODE 258: Performed by: EMERGENCY MEDICINE

## 2018-11-02 PROCEDURE — 700102 HCHG RX REV CODE 250 W/ 637 OVERRIDE(OP): Performed by: EMERGENCY MEDICINE

## 2018-11-02 PROCEDURE — 85025 COMPLETE CBC W/AUTO DIFF WBC: CPT

## 2018-11-02 PROCEDURE — 96374 THER/PROPH/DIAG INJ IV PUSH: CPT

## 2018-11-02 PROCEDURE — 80053 COMPREHEN METABOLIC PANEL: CPT

## 2018-11-02 PROCEDURE — 99284 EMERGENCY DEPT VISIT MOD MDM: CPT

## 2018-11-02 PROCEDURE — A9270 NON-COVERED ITEM OR SERVICE: HCPCS | Performed by: EMERGENCY MEDICINE

## 2018-11-02 RX ORDER — SODIUM CHLORIDE 9 MG/ML
1000 INJECTION, SOLUTION INTRAVENOUS ONCE
Status: COMPLETED | OUTPATIENT
Start: 2018-11-02 | End: 2018-11-02

## 2018-11-02 RX ORDER — DIPHENHYDRAMINE HCL 25 MG
25 TABLET ORAL ONCE
Status: COMPLETED | OUTPATIENT
Start: 2018-11-02 | End: 2018-11-02

## 2018-11-02 RX ADMIN — PROCHLORPERAZINE EDISYLATE 10 MG: 5 INJECTION INTRAMUSCULAR; INTRAVENOUS at 22:06

## 2018-11-02 RX ADMIN — SODIUM CHLORIDE 1000 ML: 9 INJECTION, SOLUTION INTRAVENOUS at 22:06

## 2018-11-02 RX ADMIN — DIPHENHYDRAMINE HCL 25 MG: 25 TABLET ORAL at 22:06

## 2018-11-02 ASSESSMENT — PAIN SCALES - GENERAL
PAINLEVEL_OUTOF10: 8
PAINLEVEL_OUTOF10: 5

## 2018-11-03 VITALS
OXYGEN SATURATION: 96 % | BODY MASS INDEX: 34.32 KG/M2 | SYSTOLIC BLOOD PRESSURE: 110 MMHG | RESPIRATION RATE: 18 BRPM | DIASTOLIC BLOOD PRESSURE: 61 MMHG | WEIGHT: 296.96 LBS | HEART RATE: 79 BPM | TEMPERATURE: 97.2 F

## 2018-11-03 PROCEDURE — 700102 HCHG RX REV CODE 250 W/ 637 OVERRIDE(OP): Performed by: EMERGENCY MEDICINE

## 2018-11-03 PROCEDURE — A9270 NON-COVERED ITEM OR SERVICE: HCPCS | Performed by: EMERGENCY MEDICINE

## 2018-11-03 PROCEDURE — 700111 HCHG RX REV CODE 636 W/ 250 OVERRIDE (IP): Performed by: EMERGENCY MEDICINE

## 2018-11-03 RX ORDER — DIVALPROEX SODIUM 500 MG/1
500 TABLET, DELAYED RELEASE ORAL ONCE
Status: COMPLETED | OUTPATIENT
Start: 2018-11-03 | End: 2018-11-03

## 2018-11-03 RX ORDER — KETOROLAC TROMETHAMINE 30 MG/ML
15 INJECTION, SOLUTION INTRAMUSCULAR; INTRAVENOUS ONCE
Status: COMPLETED | OUTPATIENT
Start: 2018-11-03 | End: 2018-11-03

## 2018-11-03 RX ADMIN — KETOROLAC TROMETHAMINE 15 MG: 30 INJECTION, SOLUTION INTRAMUSCULAR at 00:29

## 2018-11-03 RX ADMIN — DIVALPROEX SODIUM 500 MG: 500 TABLET, DELAYED RELEASE ORAL at 00:30

## 2018-11-03 NOTE — ED PROVIDER NOTES
"ED Provider Note    CHIEF COMPLAINT  Chief Complaint   Patient presents with   • Head Ache     X a few days. States, \"It triggered a sezuire.\" Reports 8/10 \"sharp\" that begins in pt's forehead and radiates laterially to pt's temples bilaterally       HPI  Norm Prabhakar is a 38 y.o. Male with a history of chronic headaches and long-standing seizures presenting with headache starting for the past few days.  Reports that it is frontal in location.  Slightly blurry vision previously however not currently..  No numbness or weakness.  No vomiting.  Has several visits in the past for headaches.  Also has a long-standing history of seizures for which she takes Depakote.  Reports compliance.  No recent fever or illness.  Denies chest pain, trouble breathing, abdominal pain, back pain, neck pain.  No history of trauma.    REVIEW OF SYSTEMS  See HPI for further details. All other systems are negative.     PAST MEDICAL HISTORY   has a past medical history of Anxiety; ASTHMA; Bipolar 1 disorder ([facility]); Depression; Fall; Glaucoma; Glaucoma (1982); Hypothyroidism; Indigestion; Mental disorder; Murmur; Pneumonia; Psychiatric problem (2002); S/P thyroidectomy; Seizure (HCC) (2010); Seizure disorder ([facility]); and Unspecified disorder of thyroid.    SOCIAL HISTORY  Social History     Social History Main Topics   • Smoking status: Current Every Day Smoker     Packs/day: 0.25     Types: Cigarettes, Cigars     Last attempt to quit: 5/1/2018   • Smokeless tobacco: Never Used   • Alcohol use No   • Drug use: Yes     Types: Inhaled      Comment: marijuana   • Sexual activity: Not on file       SURGICAL HISTORY   has a past surgical history that includes eye surgery; thyroid lobectomy; and other.    CURRENT MEDICATIONS  Home Medications    **Home medications have not yet been reviewed for this encounter**         ALLERGIES  Allergies   Allergen Reactions   • Abilify Unspecified     \"Feeling tired, like I don't even know whats going on around " "me\"   • Fish      Pt reports fish causes him to be sick to his stomach         PHYSICAL EXAM  VITAL SIGNS: /66   Pulse 84   Temp 36.1 °C (96.9 °F) (Temporal)   Resp 20   Wt (!) 134.7 kg (296 lb 15.4 oz)   SpO2 94%   BMI 34.32 kg/m²   Pulse ox interpretation: I interpret this pulse ox as normal.  Constitutional: Alert in no apparent distress.  HENT: No signs of trauma, Bilateral external ears normal, Nose normal.  Dry mucous membranes  Eyes: Conjunctiva normal, Non-icteric.   Neck: Normal range of motion, No tenderness, Supple, No stridor.   Cardiovascular: Regular rate and rhythm.   Thorax & Lungs: Normal breath sounds, No respiratory distress, No wheezing, No chest tenderness.   Abdomen: Bowel sounds normal, Soft, No tenderness, No masses, No pulsatile masses. No peritoneal signs.  Skin: Warm, Dry, No erythema, No rash.   Back: No bony tenderness, No CVA tenderness.   Extremities: Intact distal pulses, No edema, No tenderness, No cyanosis  Neurologic: Alert , Normal motor function and gait, Normal sensory function, No focal deficits noted.  Cranial nerves II through XII grossly intact      DIAGNOSTIC STUDIES / PROCEDURES    LABS  Labs Reviewed   CBC WITH DIFFERENTIAL - Abnormal; Notable for the following:        Result Value    RBC 4.27 (*)     Hemoglobin 13.5 (*)     Hematocrit 39.7 (*)     All other components within normal limits   COMP METABOLIC PANEL - Abnormal; Notable for the following:     Anion Gap 13.0 (*)     Glucose 255 (*)     All other components within normal limits   VALPROIC ACID - Abnormal; Notable for the following:     Valproic Acid 20.5 (*)     All other components within normal limits   ESTIMATED GFR       RADIOLOGY  No orders to display       COURSE & MEDICAL DECISION MAKING  Pertinent Labs & Imaging studies reviewed. (See chart for details)  38 y.o. and chronic seizures presenting with headache and seizure today.  No associated vomiting, numbness, weakness.  No focal neurologic " deficits on physical examination.  Had transient blurry vision that is now improved.  Headache is frontal in location.  Denies eye pain specifically.  No recent trauma.  No recent fevers or illness.  Patient reports compliance with his medications.  Laboratory studies were performed that were largely unremarkable except for subtherapeutic valproic acid level.  He does take Depakote for seizure activity.  Will provide extra dose of Depakote and the patient was treated with typical migraine headache therapy including IV fluid hydration, Compazine, Benadryl, Toradol.    HYDRATION: Based on the patient's presentation of Dehydration and Other Headache treatment the patient was given IV fluids. IV Hydration was used becasue oral hydration was not as rapid as required. Upon recheck following hydration, the patient was Improved.      Upon reevaluation, the patient reports feeling improved.  No seizure activity witnessed here in the emergency department.  He appears stable for discharge with further outpatient management.    The patient will return for worsening symptoms or failure of improvement and is stable at the time of discharge. The patient verbalizes understanding in their own words.    /61   Pulse 79   Temp 36.2 °C (97.2 °F)   Resp 18   Wt (!) 134.7 kg (296 lb 15.4 oz)   SpO2 96%   BMI 34.32 kg/m²     The patient was referred to primary care where they will receive further BP management.        FINAL IMPRESSION  1. Acute nonintractable headache, unspecified headache type    2. Seizure disorder (HCC)            Electronically signed by: Dorian Corona, 11/2/2018 9:44 PM

## 2018-11-03 NOTE — ED TRIAGE NOTES
"Norm Prabhakar  38 y.o. male  Chief Complaint   Patient presents with   • Head Ache     X a few days. States, \"It triggered a sezuire.\" Reports 8/10 \"sharp\" that begins in pt's forehead and radiates laterially to pt's temples bilaterally       Pt amb to triage with steady gait for above complaint. Pt reports two seazuires today due to his headache  Pt also c/o associated blurred vision  Pt is alert and oriented, speaking in full sentences, follows commands and responds appropriately to questions. NAD. Resp are even and unlabored.  Pt placed in lobby. Pt educated on triage process. Pt encouraged to alert staff for any changes.    "

## 2018-11-04 ENCOUNTER — PATIENT OUTREACH (OUTPATIENT)
Dept: HEALTH INFORMATION MANAGEMENT | Facility: OTHER | Age: 38
End: 2018-11-04

## 2018-11-04 NOTE — PROGRESS NOTES
Placed discharge outreach phone call to pt s/p ER discharge 11/3/18.  Left voicemail providing my contact information and instructions to call with any questions or concerns.

## 2018-11-13 ENCOUNTER — HOSPITAL ENCOUNTER (EMERGENCY)
Facility: MEDICAL CENTER | Age: 38
End: 2018-11-13
Attending: EMERGENCY MEDICINE
Payer: MEDICAID

## 2018-11-13 VITALS
TEMPERATURE: 97.8 F | RESPIRATION RATE: 16 BRPM | DIASTOLIC BLOOD PRESSURE: 54 MMHG | HEART RATE: 84 BPM | HEIGHT: 78 IN | OXYGEN SATURATION: 98 % | WEIGHT: 292.11 LBS | BODY MASS INDEX: 33.8 KG/M2 | SYSTOLIC BLOOD PRESSURE: 92 MMHG

## 2018-11-13 DIAGNOSIS — G43.909 MIGRAINE WITHOUT STATUS MIGRAINOSUS, NOT INTRACTABLE, UNSPECIFIED MIGRAINE TYPE: ICD-10-CM

## 2018-11-13 PROCEDURE — A9270 NON-COVERED ITEM OR SERVICE: HCPCS | Performed by: EMERGENCY MEDICINE

## 2018-11-13 PROCEDURE — 700102 HCHG RX REV CODE 250 W/ 637 OVERRIDE(OP): Performed by: EMERGENCY MEDICINE

## 2018-11-13 PROCEDURE — 700111 HCHG RX REV CODE 636 W/ 250 OVERRIDE (IP): Performed by: EMERGENCY MEDICINE

## 2018-11-13 PROCEDURE — 96375 TX/PRO/DX INJ NEW DRUG ADDON: CPT

## 2018-11-13 PROCEDURE — 99284 EMERGENCY DEPT VISIT MOD MDM: CPT

## 2018-11-13 PROCEDURE — 700105 HCHG RX REV CODE 258: Performed by: EMERGENCY MEDICINE

## 2018-11-13 PROCEDURE — 96374 THER/PROPH/DIAG INJ IV PUSH: CPT

## 2018-11-13 RX ORDER — KETOROLAC TROMETHAMINE 30 MG/ML
15 INJECTION, SOLUTION INTRAMUSCULAR; INTRAVENOUS ONCE
Status: COMPLETED | OUTPATIENT
Start: 2018-11-13 | End: 2018-11-13

## 2018-11-13 RX ORDER — METOCLOPRAMIDE HYDROCHLORIDE 5 MG/ML
10 INJECTION INTRAMUSCULAR; INTRAVENOUS ONCE
Status: COMPLETED | OUTPATIENT
Start: 2018-11-13 | End: 2018-11-13

## 2018-11-13 RX ORDER — DIPHENHYDRAMINE HCL 25 MG
25 TABLET ORAL ONCE
Status: COMPLETED | OUTPATIENT
Start: 2018-11-13 | End: 2018-11-13

## 2018-11-13 RX ORDER — SODIUM CHLORIDE, SODIUM LACTATE, POTASSIUM CHLORIDE, CALCIUM CHLORIDE 600; 310; 30; 20 MG/100ML; MG/100ML; MG/100ML; MG/100ML
1000 INJECTION, SOLUTION INTRAVENOUS ONCE
Status: COMPLETED | OUTPATIENT
Start: 2018-11-13 | End: 2018-11-13

## 2018-11-13 RX ADMIN — KETOROLAC TROMETHAMINE 15 MG: 30 INJECTION, SOLUTION INTRAMUSCULAR at 15:13

## 2018-11-13 RX ADMIN — DIPHENHYDRAMINE HCL 25 MG: 25 TABLET ORAL at 15:13

## 2018-11-13 RX ADMIN — SODIUM CHLORIDE, POTASSIUM CHLORIDE, SODIUM LACTATE AND CALCIUM CHLORIDE 1000 ML: 600; 310; 30; 20 INJECTION, SOLUTION INTRAVENOUS at 15:13

## 2018-11-13 RX ADMIN — METOCLOPRAMIDE 10 MG: 5 INJECTION, SOLUTION INTRAMUSCULAR; INTRAVENOUS at 15:13

## 2018-11-13 ASSESSMENT — LIFESTYLE VARIABLES: DO YOU DRINK ALCOHOL: NO

## 2018-11-13 ASSESSMENT — PAIN SCALES - GENERAL
PAINLEVEL_OUTOF10: 6
PAINLEVEL_OUTOF10: 10
PAINLEVEL_OUTOF10: 0

## 2018-11-13 NOTE — ED NOTES
Pt states tension style headache described as a band around his head of tightening for three days.  Hx of HA.  Pt states nausea and photophobia.  He states his normal home OTC medications and remedies did not alleviate his pain.  Patient denies numbness, weakness, or tingling to extremities.

## 2018-11-13 NOTE — ED TRIAGE NOTES
Pt to triage .  Chief Complaint   Patient presents with   • Migraine     x 3 days. hst of migraines

## 2018-11-13 NOTE — ED PROVIDER NOTES
ED Provider Note    ER PROVIDER NOTE        CHIEF COMPLAINT  Chief Complaint   Patient presents with   • Migraine     x 3 days. hst of migraines        HPI  Norm Prabhakar is a 38 y.o. male who presents to the emergency department complaining of headache.  Patient has long-standing history of migraines and this feels exactly the same.  Patient reports that he gradually developed headache over several hours 3 days ago and this has been fairly consistent.  Time to maximal intensity was over the course of the day.  He describes it as a pressure sensation and tightness that feels exactly the same as past migraines.  Has had no neck pain or stiffness.  No fevers or chills.  No visual symptoms focal weakness numbness or tingling.  He does endorse some nausea although no vomiting currently    REVIEW OF SYSTEMS  Pertinent positives include headache. Pertinent negatives include no fever. See HPI for details. All other systems reviewed and are negative.    PAST MEDICAL HISTORY   has a past medical history of Anxiety; ASTHMA; Bipolar 1 disorder ([facility]); Depression; Fall; Glaucoma; Glaucoma (1982); Hypothyroidism; Indigestion; Mental disorder; Murmur; Pneumonia; Psychiatric problem (2002); S/P thyroidectomy; Seizure ([facility]) (2010); Seizure disorder ([facility]); and Unspecified disorder of thyroid.    SURGICAL HISTORY   has a past surgical history that includes eye surgery; thyroid lobectomy; and other.    FAMILY HISTORY  Family History   Problem Relation Age of Onset   • Hypertension Mother    • Heart Disease Mother    • Lung Disease Mother    • Stroke Maternal Grandmother        SOCIAL HISTORY  Social History     Social History   • Marital status: Single     Spouse name: N/A   • Number of children: N/A   • Years of education: N/A     Social History Main Topics   • Smoking status: Current Every Day Smoker     Packs/day: 0.25     Types: Cigarettes, Cigars     Last attempt to quit: 5/1/2018   • Smokeless tobacco: Never Used   • Alcohol  "use No   • Drug use: Yes     Types: Inhaled      Comment: marijuana   • Sexual activity: Not on file     Other Topics Concern   • Not on file     Social History Narrative   • No narrative on file      History   Drug Use   • Types: Inhaled     Comment: marijuana       CURRENT MEDICATIONS  Home Medications     Reviewed by Kristine Garcia R.N. (Registered Nurse) on 11/13/18 at 1420  Med List Status: Partial   Medication Last Dose Status   atorvastatin (LIPITOR) 80 MG tablet 11/12/2018 Active   busPIRone (BUSPAR) 10 MG Tab 11/13/2018 Active   Cholecalciferol ([employer] D3) 2000 UNIT Cap 11/13/2018 Active   divalproex (DEPAKOTE) 500 MG Tablet Delayed Response 11/13/2018 Active   EPINEPHrine (EPIPEN 2-SONDRA) 0.3 MG/0.3ML Solution Auto-injector solution for injection prn Active   glimepiride (AMARYL) 4 MG Tab 11/13/2018 Active   hydrocortisone 2.5 % Cream topical cream 11/13/2018 Active   hydrOXYzine pamoate (VISTARIL) 25 MG Cap 11/13/2018 Active   levETIRAcetam (KEPPRA) 500 MG Tab 11/13/2018 Active   levothyroxine (SYNTHROID) 125 MCG Tab 11/13/2018 Active   loratadine (CLARITIN) 10 MG Tab 11/13/2018 Active   metFORMIN (GLUCOPHAGE) 1000 MG tablet 11/13/2018 Active   mirtazapine (REMERON) 30 MG Tab tablet 11/13/2018 Active   naproxen (NAPROSYN) 500 MG Tab 11/13/2018 Active   ondansetron (ZOFRAN ODT) 4 MG TABLET DISPERSIBLE 11/13/2018 Active   prazosin (MINIPRESS) 1 MG Cap  Active   predniSONE (DELTASONE) 20 MG Tab 11/13/2018 Active                ALLERGIES  Allergies   Allergen Reactions   • Abilify Unspecified     \"Feeling tired, like I don't even know whats going on around me\"   • Fish      Pt reports fish causes him to be sick to his stomach         PHYSICAL EXAM  VITAL SIGNS: BP (!) 92/54   Pulse 84   Temp 36.6 °C (97.8 °F)   Resp 16   Ht 1.981 m (6' 6\")   Wt (!) 132.5 kg (292 lb 1.8 oz)   SpO2 98%   BMI 33.76 kg/m²   Pulse ox interpretation: I interpret this pulse ox as normal.    Constitutional: Alert in no apparent " distress.  HENT: No signs of trauma, Bilateral external ears normal, Nose normal.  Mucous membranes mildly dry, temples and sinuses nontender to palpation  Eyes: Right pupil is round, 4 mm to 2 mm reactive, left pupil with post surgical changes, Conjunctiva normal, Non-icteric.   Neck: Normal range of motion, No tenderness, Supple, No stridor.   Lymphatic: No lymphadenopathy noted.   Cardiovascular: Regular rate and rhythm, no murmurs.   Thorax & Lungs: Normal breath sounds, No respiratory distress, No wheezing, No chest tenderness.   Abdomen: Bowel sounds normal, Soft, No tenderness, No masses, No pulsatile masses. No peritoneal signs.  Skin: Warm, Dry, No erythema, No rash.   Back: No bony tenderness, No CVA tenderness.   Extremities: Intact distal pulses, No edema, No tenderness, No cyanosis, Negative Florencio's sign.  Musculoskeletal: Good range of motion in all major joints. No tenderness to palpation or major deformities noted.   Neurologic: Alert, cranial nerves intact, speech is appropriate or not slurred, upper extremities bilaterally exhibit no drift, no dysmetria, 5 out of 5 strength with bilateral bicep/tricep/, sensation intact to light touch throughout upper extremities. Lower extremities strength 5 out of 5 thigh extension/flexion/abduction/adduction, knee extension/flexion, dorsiflexion plantar flexion. No clonus.  2+ patella reflexes.  sensation intact to light touch.  No focal deficits noted. Ambulates with steady gait, steady tandem gait  Psychiatric: Affect normal, Judgment normal, Mood normal.       DIAGNOSTIC STUDIES / PROCEDURES    .    RADIOLOGY  No orders to display     The radiologist's interpretation of all radiological studies have been reviewed by me.    COURSE & MEDICAL DECISION MAKING  Nursing notes, VS, PMSFHx reviewed in chart.    2:40 PM Patient seen and examined at bedside. Patient will be treated with IV fluids, Toradol, Reglan, Benadryl.     HYDRATION: Based on the patient's  presentation of Dehydration and Inability to take oral fluids the patient was given IV fluids. IV Hydration was used because oral hydration failed due to Patient's nausea. Upon recheck following hydration, the patient was Improved.     4:47 PM  Patient reevaluated, states his headache is fully resolved,    Decision Making:  This is a 38 y.o. male presenting with headache, likely migraine headache  based on my history and physical exam.  Given the fact that this feels exactly like his past headaches as well as the lack of rapid onset and severity of the headache, in addition to the well appearance of the pt makes the dx of subarchnoid hemorrhage less likely. The normal vital signs, lack of a temperature and lack of meningeal signs (supple neck without pain with rom) makes the dx of meningitis less likely.  The patient has no neurologic signs, no fever, and is immunocompetent therefore the time course would be inconsistent with abscess. The absence of neurologic signs and the short duration of sx make neoplasm unlikely.   Other diagnoses I also considered but consider unlikely are temporal arteritis (no temple pain, no vision change), glaucoma (no vision change, perrla on exam), sinusitis (no sinus tenderness), pseudotumor cerebri, dural venous thrombosis, and cluster headache (headache duration and character are inconsistent with this diagnosis).    The patient will return for new or worsening symptoms and is stable at the time of discharge.    The patient is referred to a primary physician for blood pressure management, diabetic screening, and for all other preventative health concerns.      DISPOSITION:  Patient will be discharged home in stable condition.    FOLLOW UP:  JOSE ChungR.N.  [address] [address]  [phone]    In 1 week        OUTPATIENT MEDICATIONS:  New Prescriptions    No medications on file         FINAL IMPRESSION  1. Migraine without status migrainosus, not  intractable, unspecified migraine type         The note accurately reflects work and decisions made by me.  Sal Pereyra  11/13/2018  4:47 PM

## 2018-11-14 ENCOUNTER — PATIENT OUTREACH (OUTPATIENT)
Dept: HEALTH INFORMATION MANAGEMENT | Facility: OTHER | Age: 38
End: 2018-11-14

## 2018-11-14 NOTE — LETTER
Norm Prabhakar  [facility] [place] [address]    November 14, 2018      Dear Norm Prabhakar,    [facility] wants to ensure your discharge home is safe and you or your loved ones have had all of your questions answered regarding your care after you leave the hospital.    Our discharge team was unsuccessful in our attempts to contact you telephonically and we wanted to be sure that you had a list of resources and contact information should you have any questions regarding your hospital stay, follow-up instructions, or active medical symptoms.    Questions or Concerns Regarding… Contact   Medical Questions Related to Your Discharge  (7 days a week, 8am-5pm) Contact a Nurse Care Coordinator   [phone]   Medical Questions Not Related to Your Discharge  (24 hours a day / 7 days a week)  Contact the Nurse Health Line   [phone]    Medications or Discharge Instructions Refer to your discharge packet   or contact your -[phone]   Follow-up Appointment(s) Schedule your appointment via [employer]   or contact Scheduling [phone]   Billing Review your statement via [employer]  or contact Billing [phone]   Medical Records Review your records via [employer]   or contact Medical Records [phone]     You can also easily access your medical information, test results and upcoming appointments via the [employer] free online health management tool. You can learn more and sign up at [email or web address]. For assistance setting up your [employer] account, please call [phone].    Once again, we want to ensure your discharge home is safe and that you have a clear understanding of any next steps in your care. If you have any questions or concerns, please do not hesitate to contact us, we are here for you. Thank you for choosing [facility] for your healthcare needs.    Sincerely,      Your [facility] Healthcare Team

## 2018-11-21 NOTE — ED NOTES
Med rec complete per Pt at bedside  Allergies reviewed  No ABX in last 30 days    Verified Results  COMP METABOLIC PNL [id number] 09:49AM KARINA GALLARDO   [Jan 25, 2018 9:39PM KARINA GALLARDO]  There is protein in the urine sample. One of the liver tests  is high. Your kidney function is decreased . This can happen with fasting or dehydration - pls recheck within 2 wks at a non fasting lab appt.   sugar is averaging 160 and should be less than 100 - pls start a medicine called glimepiride w/ breakfast and then see me in 2 wks. we will call in a glucometer for you (PLS CALL THIS IN )   All of your other blood work is normal - cholesterol, cell counts, and thyroid tests are normal.   see me in 2 wks and we'll see how your sugars are     Test Name Result Flag Reference   SODIUM 136 mmol/L  135-145   POTASSIUM 3.8 mmol/L  3.4-5.1   CHLORIDE 99 mmol/L     CARBON DIOXIDE 29 mmol/L  21-32   ANION GAP 12 mmol/L  10-20   GLUCOSE 134 mg/dl H 65-99   BUN 10 mg/dl  6-20   CREATININE 0.76 mg/dl  0.51-0.95   GFR EST.AFRICAN AMER >90     eGFR results = or >90 mL/min/1.73m2 = Normal kidney function.   GFR EST.NONAFRI AMER 85     eGFR 60 - 89 mL/min/1.73m2 = Mild decrease in kidney function.   BUN/CREATININE RATIO 13  7-25   BILIRUBIN TOTAL 1.3 mg/dl H 0.2-1.0   GOT/AST 66 Units/L H <38   ALKALINE PHOSPHATASE 70 Units/L     ALBUMIN 3.9 g/dl  3.6-5.1   TOTAL PROTEIN 7.3 g/dl  6.4-8.2   GLOBULIN (CALCULATED) 3.4 g/dl  2.0-4.0   A/G RATIO 1.1  1.0-2.4   CALCIUM 9.0 mg/dl  8.4-10.2   GPT/ALT 65 Units/L  <79   FASTING STATUS 12 hrs         Message   There is protein in the urine sample.  One of the liver tests   is high.  Your kidney function is decreased . This can happen with fasting or dehydration - pls recheck within  2 wks at a non fasting lab appt.    sugar is averaging 160 and should be less than 100 - pls start a medicine called glimepiride w/ breakfast and then see me in 2 wks. we will call in a glucometer for you (PLS CALL THIS IN )    All of your other blood work is normal - cholesterol, cell  counts,  and thyroid tests are normal.    see me in 2 wks and we'll see how your sugars are

## 2018-11-27 ENCOUNTER — HOSPITAL ENCOUNTER (EMERGENCY)
Facility: MEDICAL CENTER | Age: 38
End: 2018-11-27
Attending: EMERGENCY MEDICINE
Payer: MEDICAID

## 2018-11-27 ENCOUNTER — APPOINTMENT (OUTPATIENT)
Dept: RADIOLOGY | Facility: MEDICAL CENTER | Age: 38
End: 2018-11-27
Attending: EMERGENCY MEDICINE
Payer: MEDICAID

## 2018-11-27 VITALS
WEIGHT: 290 LBS | DIASTOLIC BLOOD PRESSURE: 95 MMHG | BODY MASS INDEX: 33.55 KG/M2 | SYSTOLIC BLOOD PRESSURE: 137 MMHG | HEIGHT: 78 IN | HEART RATE: 75 BPM | RESPIRATION RATE: 16 BRPM | TEMPERATURE: 97 F | OXYGEN SATURATION: 94 %

## 2018-11-27 DIAGNOSIS — R22.1 NECK MASS: ICD-10-CM

## 2018-11-27 LAB
ANION GAP SERPL CALC-SCNC: 11 MMOL/L (ref 0–11.9)
BASOPHILS # BLD AUTO: 0.9 % (ref 0–1.8)
BASOPHILS # BLD: 0.09 K/UL (ref 0–0.12)
BUN SERPL-MCNC: 19 MG/DL (ref 8–22)
CALCIUM SERPL-MCNC: 10.4 MG/DL (ref 8.5–10.5)
CHLORIDE SERPL-SCNC: 103 MMOL/L (ref 96–112)
CO2 SERPL-SCNC: 26 MMOL/L (ref 20–33)
CREAT SERPL-MCNC: 1.11 MG/DL (ref 0.5–1.4)
EOSINOPHIL # BLD AUTO: 0.11 K/UL (ref 0–0.51)
EOSINOPHIL NFR BLD: 1.2 % (ref 0–6.9)
ERYTHROCYTE [DISTWIDTH] IN BLOOD BY AUTOMATED COUNT: 43.8 FL (ref 35.9–50)
GLUCOSE SERPL-MCNC: 95 MG/DL (ref 65–99)
HCT VFR BLD AUTO: 42.3 % (ref 42–52)
HGB BLD-MCNC: 14 G/DL (ref 14–18)
IMM GRANULOCYTES # BLD AUTO: 0.03 K/UL (ref 0–0.11)
IMM GRANULOCYTES NFR BLD AUTO: 0.3 % (ref 0–0.9)
LYMPHOCYTES # BLD AUTO: 4.03 K/UL (ref 1–4.8)
LYMPHOCYTES NFR BLD: 42.4 % (ref 22–41)
MCH RBC QN AUTO: 30.8 PG (ref 27–33)
MCHC RBC AUTO-ENTMCNC: 33.1 G/DL (ref 33.7–35.3)
MCV RBC AUTO: 93.2 FL (ref 81.4–97.8)
MONOCYTES # BLD AUTO: 0.9 K/UL (ref 0–0.85)
MONOCYTES NFR BLD AUTO: 9.5 % (ref 0–13.4)
NEUTROPHILS # BLD AUTO: 4.34 K/UL (ref 1.82–7.42)
NEUTROPHILS NFR BLD: 45.7 % (ref 44–72)
NRBC # BLD AUTO: 0 K/UL
NRBC BLD-RTO: 0 /100 WBC
PLATELET # BLD AUTO: 375 K/UL (ref 164–446)
PMV BLD AUTO: 10.3 FL (ref 9–12.9)
POTASSIUM SERPL-SCNC: 4 MMOL/L (ref 3.6–5.5)
RBC # BLD AUTO: 4.54 M/UL (ref 4.7–6.1)
SODIUM SERPL-SCNC: 140 MMOL/L (ref 135–145)
WBC # BLD AUTO: 9.5 K/UL (ref 4.8–10.8)

## 2018-11-27 PROCEDURE — 84443 ASSAY THYROID STIM HORMONE: CPT

## 2018-11-27 PROCEDURE — 80048 BASIC METABOLIC PNL TOTAL CA: CPT

## 2018-11-27 PROCEDURE — 700117 HCHG RX CONTRAST REV CODE 255: Performed by: EMERGENCY MEDICINE

## 2018-11-27 PROCEDURE — 99284 EMERGENCY DEPT VISIT MOD MDM: CPT

## 2018-11-27 PROCEDURE — 85025 COMPLETE CBC W/AUTO DIFF WBC: CPT

## 2018-11-27 PROCEDURE — 70491 CT SOFT TISSUE NECK W/DYE: CPT

## 2018-11-27 RX ADMIN — IOHEXOL 100 ML: 350 INJECTION, SOLUTION INTRAVENOUS at 19:58

## 2018-11-27 ASSESSMENT — PAIN SCALES - GENERAL: PAINLEVEL_OUTOF10: 8

## 2018-11-28 LAB — TSH SERPL DL<=0.005 MIU/L-ACNC: 23.67 UIU/ML (ref 0.38–5.33)

## 2018-11-28 NOTE — ED PROVIDER NOTES
CHIEF COMPLAINT  No chief complaint on file.      HPI  Norm Prabhakar is a 38 y.o. male who presents with what appears to be subacute and/or chronic swelling of his throat in the area where his thyroid used to be.  He apparently had a thyroidectomy when he was a teenager.  He notes associated more recent sore throat, especially over the last couple of days.  No fever.  Nothing makes his symptoms better.    REVIEW OF SYSTEMS  All other systems are negative.     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   • Hypothyroidism    • Indigestion     once in a while   • Mental disorder     learning disabilities; speech impairment; developmental delays   • Murmur     since birth   • Pneumonia     remote   • Psychiatric problem 2002    PTSD   • S/P thyroidectomy    • Seizure ([facility]) 2010   • Seizure disorder ([facility])    • Unspecified disorder of thyroid        FAMILY HISTORY  Family History   Problem Relation Age of Onset   • Hypertension Mother    • Heart Disease Mother    • Lung Disease Mother    • Stroke Maternal Grandmother        SOCIAL HISTORY  Social History     Social History   • Marital status: Single     Spouse name: N/A   • Number of children: N/A   • Years of education: N/A     Social History Main Topics   • Smoking status: Current Every Day Smoker     Packs/day: 0.25     Types: Cigarettes, Cigars     Last attempt to quit: 5/1/2018   • Smokeless tobacco: Never Used   • Alcohol use No   • Drug use: Yes     Types: Inhaled      Comment: marijuana   • Sexual activity: Not on file     Other Topics Concern   • Not on file     Social History Narrative   • No narrative on file       SURGICAL HISTORY  Past Surgical History:   Procedure Laterality Date   • EYE SURGERY     • OTHER      Hernia Repair when he was 10 yrs old   • THYROID  "LOBECTOMY         CURRENT MEDICATIONS  Home Medications    **Home medications have not yet been reviewed for this encounter**         ALLERGIES  Allergies   Allergen Reactions   • Abilify Unspecified     \"Feeling tired, like I don't even know whats going on around me\"   • Fish      Pt reports fish causes him to be sick to his stomach         PHYSICAL EXAM  VITAL SIGNS: /69   Pulse 74   Temp 36.1 °C (97 °F) (Temporal)   Resp 16   Ht 1.981 m (6' 6\")   Wt (!) 131.5 kg (290 lb)   SpO2 94%   BMI 33.51 kg/m²      Constitutional: Well developed, Well nourished, No acute distress, Non-toxic appearance.   HENT: Normocephalic, Atraumatic, TMs normal, mucous membranes moist, no erythema, exudates, swelling, or masses, nares patent  Eyes: nonicteric  Neck: Supple, soft tissue swelling noted in the area of the thyroid, it appears to be mobile  Lymphatic: No lymphadenopathy noted.   Cardiovascular: Regular rate and rhythm, no gallops rubs or murmurs  Lungs: Clear bilaterally   Skin: Warm, Dry, no rash  Back: No tenderness  Genitalia: Deferred  Rectal: Deferred  Extremities: No edema  Neurologic: Alert, appropriate, follows commands, moving all extremities, normal speech   Psychiatric: Affect normal    RADIOLOGY/PROCEDURES  CT-SOFT TISSUE NECK WITH   Final Result         1.  Enhancing hyperdense nodular densities in the anterior neck, appearance suggests thyroid tissue. Correlate with history. Definitive characterization with thyroid scan recommended as clinically appropriate to evaluate for hyperdense neck masses.        White count 9.5, hemoglobin 14, no left shift, sodium 140, potassium 4.0, bicarb 26, creatinine 1.11    COURSE & MEDICAL DECISION MAKING  Pertinent Labs & Imaging studies reviewed. (See chart for details)  This is a 38-year-old male status post hemithyroidectomy distantly as a child.  He presents with neck swelling and pain.  He appears to have 2 separate densities in the anterior soft tissues of the " neck that appear consistent with thyroid tissue.  There is not appear to be any impingement on the airway structures of the posterior pharynx.  Patient otherwise has unremarkable labs here.  We will given follow-up with ear nose and throat for further evaluation and treatment.    FINAL IMPRESSION  1.  Neck mass  2.   3.         Electronically signed by: Nixon Woo, 11/27/2018 6:42 PM

## 2018-11-28 NOTE — ED TRIAGE NOTES
"Chief Complaint   Patient presents with   • Difficulty Swallowing     walk in from home complaining of difficulty swallowing and sore throat for a couple of day. Pt has had swelling front of throat for years. Goiter/thyroid removal when pt was in HS. Airway patent. Denies CP or SOB.      /69   Pulse 74   Temp 36.1 °C (97 °F) (Temporal)   Resp 16   Ht 1.981 m (6' 6\")   Wt (!) 131.5 kg (290 lb)   SpO2 94%   BMI 33.51 kg/m²     "

## 2018-11-28 NOTE — DISCHARGE INSTRUCTIONS
Follow-up with ear nose and throat.  You have 2 separate masses in the anterior neck that will need evaluation by otolaryngology.  Return for trouble breathing increasing pain or other concerns.

## 2018-12-30 ENCOUNTER — APPOINTMENT (OUTPATIENT)
Dept: RADIOLOGY | Facility: MEDICAL CENTER | Age: 38
DRG: 880 | End: 2018-12-30
Attending: EMERGENCY MEDICINE
Payer: MEDICAID

## 2018-12-30 ENCOUNTER — HOSPITAL ENCOUNTER (INPATIENT)
Facility: MEDICAL CENTER | Age: 38
LOS: 3 days | DRG: 880 | End: 2019-01-03
Attending: EMERGENCY MEDICINE | Admitting: HOSPITALIST
Payer: MEDICAID

## 2018-12-30 DIAGNOSIS — R20.0 RIGHT SIDED NUMBNESS: ICD-10-CM

## 2018-12-30 DIAGNOSIS — R53.1 ACUTE RIGHT-SIDED WEAKNESS: ICD-10-CM

## 2018-12-30 LAB
ABO GROUP BLD: NORMAL
ALBUMIN SERPL BCP-MCNC: 4.3 G/DL (ref 3.2–4.9)
ALBUMIN/GLOB SERPL: 2 G/DL
ALP SERPL-CCNC: 61 U/L (ref 30–99)
ALT SERPL-CCNC: 11 U/L (ref 2–50)
ANION GAP SERPL CALC-SCNC: 11 MMOL/L (ref 0–11.9)
APTT PPP: 29.6 SEC (ref 24.7–36)
AST SERPL-CCNC: 12 U/L (ref 12–45)
BASOPHILS # BLD AUTO: 0.7 % (ref 0–1.8)
BASOPHILS # BLD: 0.07 K/UL (ref 0–0.12)
BILIRUB SERPL-MCNC: 0.2 MG/DL (ref 0.1–1.5)
BLD GP AB SCN SERPL QL: NORMAL
BUN SERPL-MCNC: 25 MG/DL (ref 8–22)
CALCIUM SERPL-MCNC: 10 MG/DL (ref 8.5–10.5)
CHLORIDE SERPL-SCNC: 102 MMOL/L (ref 96–112)
CO2 SERPL-SCNC: 25 MMOL/L (ref 20–33)
CREAT SERPL-MCNC: 1.04 MG/DL (ref 0.5–1.4)
EOSINOPHIL # BLD AUTO: 0.2 K/UL (ref 0–0.51)
EOSINOPHIL NFR BLD: 2 % (ref 0–6.9)
ERYTHROCYTE [DISTWIDTH] IN BLOOD BY AUTOMATED COUNT: 43.2 FL (ref 35.9–50)
GLOBULIN SER CALC-MCNC: 2.2 G/DL (ref 1.9–3.5)
GLUCOSE SERPL-MCNC: 82 MG/DL (ref 65–99)
HCT VFR BLD AUTO: 41.3 % (ref 42–52)
HGB BLD-MCNC: 13.5 G/DL (ref 14–18)
IMM GRANULOCYTES # BLD AUTO: 0.04 K/UL (ref 0–0.11)
IMM GRANULOCYTES NFR BLD AUTO: 0.4 % (ref 0–0.9)
INR PPP: 0.98 (ref 0.87–1.13)
LYMPHOCYTES # BLD AUTO: 4.23 K/UL (ref 1–4.8)
LYMPHOCYTES NFR BLD: 41.8 % (ref 22–41)
MCH RBC QN AUTO: 30.6 PG (ref 27–33)
MCHC RBC AUTO-ENTMCNC: 32.7 G/DL (ref 33.7–35.3)
MCV RBC AUTO: 93.7 FL (ref 81.4–97.8)
MONOCYTES # BLD AUTO: 1.04 K/UL (ref 0–0.85)
MONOCYTES NFR BLD AUTO: 10.3 % (ref 0–13.4)
NEUTROPHILS # BLD AUTO: 4.55 K/UL (ref 1.82–7.42)
NEUTROPHILS NFR BLD: 44.8 % (ref 44–72)
NRBC # BLD AUTO: 0 K/UL
NRBC BLD-RTO: 0 /100 WBC
PLATELET # BLD AUTO: 302 K/UL (ref 164–446)
PMV BLD AUTO: 10.3 FL (ref 9–12.9)
POTASSIUM SERPL-SCNC: 3.8 MMOL/L (ref 3.6–5.5)
PROT SERPL-MCNC: 6.5 G/DL (ref 6–8.2)
PROTHROMBIN TIME: 13.1 SEC (ref 12–14.6)
RBC # BLD AUTO: 4.41 M/UL (ref 4.7–6.1)
RH BLD: NORMAL
SODIUM SERPL-SCNC: 138 MMOL/L (ref 135–145)
TROPONIN I SERPL-MCNC: <0.01 NG/ML (ref 0–0.04)
WBC # BLD AUTO: 10.1 K/UL (ref 4.8–10.8)

## 2018-12-30 PROCEDURE — 86901 BLOOD TYPING SEROLOGIC RH(D): CPT

## 2018-12-30 PROCEDURE — 70496 CT ANGIOGRAPHY HEAD: CPT

## 2018-12-30 PROCEDURE — 99285 EMERGENCY DEPT VISIT HI MDM: CPT

## 2018-12-30 PROCEDURE — 93005 ELECTROCARDIOGRAM TRACING: CPT | Performed by: EMERGENCY MEDICINE

## 2018-12-30 PROCEDURE — 80053 COMPREHEN METABOLIC PANEL: CPT

## 2018-12-30 PROCEDURE — 85610 PROTHROMBIN TIME: CPT

## 2018-12-30 PROCEDURE — 70498 CT ANGIOGRAPHY NECK: CPT

## 2018-12-30 PROCEDURE — 85730 THROMBOPLASTIN TIME PARTIAL: CPT

## 2018-12-30 PROCEDURE — 86850 RBC ANTIBODY SCREEN: CPT

## 2018-12-30 PROCEDURE — 0042T CT-CEREBRAL PERFUSION ANALYSIS: CPT

## 2018-12-30 PROCEDURE — 71045 X-RAY EXAM CHEST 1 VIEW: CPT

## 2018-12-30 PROCEDURE — 86900 BLOOD TYPING SEROLOGIC ABO: CPT

## 2018-12-30 PROCEDURE — 84484 ASSAY OF TROPONIN QUANT: CPT

## 2018-12-30 PROCEDURE — 94760 N-INVAS EAR/PLS OXIMETRY 1: CPT

## 2018-12-30 PROCEDURE — 700117 HCHG RX CONTRAST REV CODE 255: Performed by: EMERGENCY MEDICINE

## 2018-12-30 PROCEDURE — 80164 ASSAY DIPROPYLACETIC ACD TOT: CPT

## 2018-12-30 PROCEDURE — 70450 CT HEAD/BRAIN W/O DYE: CPT

## 2018-12-30 PROCEDURE — 85025 COMPLETE CBC W/AUTO DIFF WBC: CPT

## 2018-12-30 RX ORDER — VALPROATE SODIUM 100 MG/ML
2000 INJECTION INTRAVENOUS ONCE
Status: DISCONTINUED | OUTPATIENT
Start: 2018-12-31 | End: 2018-12-30

## 2018-12-30 RX ADMIN — IOHEXOL 80 ML: 350 INJECTION, SOLUTION INTRAVENOUS at 22:46

## 2018-12-30 RX ADMIN — IOHEXOL 40 ML: 350 INJECTION, SOLUTION INTRAVENOUS at 22:47

## 2018-12-30 ASSESSMENT — LIFESTYLE VARIABLES: DO YOU DRINK ALCOHOL: NO

## 2018-12-31 ENCOUNTER — APPOINTMENT (OUTPATIENT)
Dept: RADIOLOGY | Facility: MEDICAL CENTER | Age: 38
DRG: 880 | End: 2018-12-31
Attending: HOSPITALIST
Payer: MEDICAID

## 2018-12-31 ENCOUNTER — APPOINTMENT (OUTPATIENT)
Dept: RADIOLOGY | Facility: MEDICAL CENTER | Age: 38
DRG: 880 | End: 2018-12-31
Attending: PSYCHIATRY & NEUROLOGY
Payer: MEDICAID

## 2018-12-31 LAB
APPEARANCE UR: CLEAR
BILIRUB UR QL STRIP.AUTO: NEGATIVE
COLOR UR: YELLOW
EKG IMPRESSION: NORMAL
EST. AVERAGE GLUCOSE BLD GHB EST-MCNC: 143 MG/DL
GLUCOSE UR STRIP.AUTO-MCNC: NEGATIVE MG/DL
HBA1C MFR BLD: 6.6 % (ref 0–5.6)
KETONES UR STRIP.AUTO-MCNC: NEGATIVE MG/DL
LEUKOCYTE ESTERASE UR QL STRIP.AUTO: NEGATIVE
MICRO URNS: NORMAL
NITRITE UR QL STRIP.AUTO: NEGATIVE
PH UR STRIP.AUTO: 7 [PH]
PROT UR QL STRIP: NEGATIVE MG/DL
RBC UR QL AUTO: NEGATIVE
SP GR UR STRIP.AUTO: 1.04
UROBILINOGEN UR STRIP.AUTO-MCNC: 0.2 MG/DL
VALPROATE SERPL-MCNC: 20.5 UG/ML (ref 50–100)

## 2018-12-31 PROCEDURE — 770006 HCHG ROOM/CARE - MED/SURG/GYN SEMI*

## 2018-12-31 PROCEDURE — 700111 HCHG RX REV CODE 636 W/ 250 OVERRIDE (IP): Performed by: INTERNAL MEDICINE

## 2018-12-31 PROCEDURE — 99222 1ST HOSP IP/OBS MODERATE 55: CPT | Mod: 25 | Performed by: PSYCHIATRY & NEUROLOGY

## 2018-12-31 PROCEDURE — 95951 PR EEG MONITORING/VIDEORECORD: CPT | Mod: 26,52 | Performed by: PSYCHIATRY & NEUROLOGY

## 2018-12-31 PROCEDURE — 36415 COLL VENOUS BLD VENIPUNCTURE: CPT

## 2018-12-31 PROCEDURE — 96375 TX/PRO/DX INJ NEW DRUG ADDON: CPT

## 2018-12-31 PROCEDURE — 90686 IIV4 VACC NO PRSV 0.5 ML IM: CPT | Performed by: INTERNAL MEDICINE

## 2018-12-31 PROCEDURE — 700111 HCHG RX REV CODE 636 W/ 250 OVERRIDE (IP): Performed by: EMERGENCY MEDICINE

## 2018-12-31 PROCEDURE — 700105 HCHG RX REV CODE 258: Performed by: HOSPITALIST

## 2018-12-31 PROCEDURE — 95951 EEG: CPT | Mod: 52 | Performed by: PSYCHIATRY & NEUROLOGY

## 2018-12-31 PROCEDURE — 700102 HCHG RX REV CODE 250 W/ 637 OVERRIDE(OP): Performed by: HOSPITALIST

## 2018-12-31 PROCEDURE — 700105 HCHG RX REV CODE 258: Performed by: EMERGENCY MEDICINE

## 2018-12-31 PROCEDURE — 3E02340 INTRODUCTION OF INFLUENZA VACCINE INTO MUSCLE, PERCUTANEOUS APPROACH: ICD-10-PCS | Performed by: INTERNAL MEDICINE

## 2018-12-31 PROCEDURE — 96372 THER/PROPH/DIAG INJ SC/IM: CPT

## 2018-12-31 PROCEDURE — 70551 MRI BRAIN STEM W/O DYE: CPT

## 2018-12-31 PROCEDURE — A9270 NON-COVERED ITEM OR SERVICE: HCPCS | Performed by: HOSPITALIST

## 2018-12-31 PROCEDURE — 80177 DRUG SCRN QUAN LEVETIRACETAM: CPT

## 2018-12-31 PROCEDURE — 4A00X4Z MEASUREMENT OF CENTRAL NERVOUS ELECTRICAL ACTIVITY, EXTERNAL APPROACH: ICD-10-PCS | Performed by: PSYCHIATRY & NEUROLOGY

## 2018-12-31 PROCEDURE — 99223 1ST HOSP IP/OBS HIGH 75: CPT | Performed by: HOSPITALIST

## 2018-12-31 PROCEDURE — G8996 SWALLOW CURRENT STATUS: HCPCS | Mod: CI

## 2018-12-31 PROCEDURE — 92610 EVALUATE SWALLOWING FUNCTION: CPT

## 2018-12-31 PROCEDURE — G8997 SWALLOW GOAL STATUS: HCPCS | Mod: CH

## 2018-12-31 PROCEDURE — 81003 URINALYSIS AUTO W/O SCOPE: CPT

## 2018-12-31 PROCEDURE — 83036 HEMOGLOBIN GLYCOSYLATED A1C: CPT

## 2018-12-31 PROCEDURE — 90471 IMMUNIZATION ADMIN: CPT

## 2018-12-31 PROCEDURE — 700111 HCHG RX REV CODE 636 W/ 250 OVERRIDE (IP): Performed by: HOSPITALIST

## 2018-12-31 PROCEDURE — 96365 THER/PROPH/DIAG IV INF INIT: CPT

## 2018-12-31 RX ORDER — PROMETHAZINE HYDROCHLORIDE 25 MG/1
12.5-25 SUPPOSITORY RECTAL EVERY 4 HOURS PRN
Status: DISCONTINUED | OUTPATIENT
Start: 2018-12-31 | End: 2019-01-03 | Stop reason: HOSPADM

## 2018-12-31 RX ORDER — PROMETHAZINE HYDROCHLORIDE 25 MG/1
12.5-25 TABLET ORAL EVERY 4 HOURS PRN
Status: DISCONTINUED | OUTPATIENT
Start: 2018-12-31 | End: 2019-01-03 | Stop reason: HOSPADM

## 2018-12-31 RX ORDER — BUSPIRONE HYDROCHLORIDE 15 MG/1
15 TABLET ORAL 3 TIMES DAILY
Status: ON HOLD | COMMUNITY
End: 2019-01-10

## 2018-12-31 RX ORDER — MONTELUKAST SODIUM 10 MG/1
10 TABLET ORAL NIGHTLY
Status: DISCONTINUED | OUTPATIENT
Start: 2018-12-31 | End: 2019-01-03 | Stop reason: HOSPADM

## 2018-12-31 RX ORDER — LEVOTHYROXINE SODIUM 137 UG/1
137 TABLET ORAL
Status: ON HOLD | COMMUNITY
End: 2019-01-03

## 2018-12-31 RX ORDER — NYSTATIN 100000 U/G
OINTMENT TOPICAL 2 TIMES DAILY PRN
Status: ON HOLD | COMMUNITY
End: 2019-01-03

## 2018-12-31 RX ORDER — SERTRALINE HYDROCHLORIDE 100 MG/1
100 TABLET, FILM COATED ORAL DAILY
Status: DISCONTINUED | OUTPATIENT
Start: 2018-12-31 | End: 2019-01-03 | Stop reason: HOSPADM

## 2018-12-31 RX ORDER — SERTRALINE HYDROCHLORIDE 100 MG/1
100 TABLET, FILM COATED ORAL EVERY MORNING
Status: ON HOLD | COMMUNITY
End: 2019-01-10

## 2018-12-31 RX ORDER — ONDANSETRON 4 MG/1
4 TABLET, ORALLY DISINTEGRATING ORAL EVERY 4 HOURS PRN
Status: DISCONTINUED | OUTPATIENT
Start: 2018-12-31 | End: 2019-01-03 | Stop reason: HOSPADM

## 2018-12-31 RX ORDER — LEVETIRACETAM 500 MG/1
500 TABLET ORAL 2 TIMES DAILY
Status: DISCONTINUED | OUTPATIENT
Start: 2018-12-31 | End: 2018-12-31

## 2018-12-31 RX ORDER — ACETAMINOPHEN 325 MG/1
650 TABLET ORAL EVERY 6 HOURS PRN
Status: DISCONTINUED | OUTPATIENT
Start: 2018-12-31 | End: 2019-01-03 | Stop reason: HOSPADM

## 2018-12-31 RX ORDER — LEVOTHYROXINE SODIUM 137 UG/1
137 TABLET ORAL
Status: DISCONTINUED | OUTPATIENT
Start: 2018-12-31 | End: 2019-01-03 | Stop reason: HOSPADM

## 2018-12-31 RX ORDER — POLYETHYLENE GLYCOL 3350 17 G/17G
1 POWDER, FOR SOLUTION ORAL
Status: DISCONTINUED | OUTPATIENT
Start: 2018-12-31 | End: 2019-01-03 | Stop reason: HOSPADM

## 2018-12-31 RX ORDER — CEPHALEXIN 500 MG/1
500 CAPSULE ORAL 3 TIMES DAILY
Status: ON HOLD | COMMUNITY
End: 2019-01-03

## 2018-12-31 RX ORDER — MONTELUKAST SODIUM 10 MG/1
10 TABLET ORAL DAILY
Status: ON HOLD | COMMUNITY
End: 2019-01-10

## 2018-12-31 RX ORDER — AMOXICILLIN 250 MG
2 CAPSULE ORAL 2 TIMES DAILY
Status: DISCONTINUED | OUTPATIENT
Start: 2018-12-31 | End: 2019-01-03 | Stop reason: HOSPADM

## 2018-12-31 RX ORDER — PRAZOSIN HYDROCHLORIDE 1 MG/1
1 CAPSULE ORAL NIGHTLY
Status: DISCONTINUED | OUTPATIENT
Start: 2018-12-31 | End: 2019-01-03 | Stop reason: HOSPADM

## 2018-12-31 RX ORDER — CLONIDINE HYDROCHLORIDE 0.1 MG/1
0.1 TABLET ORAL EVERY 6 HOURS PRN
Status: DISCONTINUED | OUTPATIENT
Start: 2018-12-31 | End: 2019-01-03 | Stop reason: HOSPADM

## 2018-12-31 RX ORDER — ATORVASTATIN CALCIUM 80 MG/1
80 TABLET, FILM COATED ORAL NIGHTLY
Status: DISCONTINUED | OUTPATIENT
Start: 2018-12-31 | End: 2019-01-03 | Stop reason: HOSPADM

## 2018-12-31 RX ORDER — TRIAMCINOLONE ACETONIDE 1 MG/G
CREAM TOPICAL 3 TIMES DAILY PRN
Status: ON HOLD | COMMUNITY
End: 2019-01-03

## 2018-12-31 RX ORDER — RANITIDINE 150 MG/1
150 TABLET ORAL 2 TIMES DAILY
Status: ON HOLD | COMMUNITY
End: 2019-01-10

## 2018-12-31 RX ORDER — BISACODYL 10 MG
10 SUPPOSITORY, RECTAL RECTAL
Status: DISCONTINUED | OUTPATIENT
Start: 2018-12-31 | End: 2019-01-03 | Stop reason: HOSPADM

## 2018-12-31 RX ORDER — MIRTAZAPINE 15 MG/1
15-30 TABLET, FILM COATED ORAL NIGHTLY
Status: DISCONTINUED | OUTPATIENT
Start: 2018-12-31 | End: 2018-12-31

## 2018-12-31 RX ORDER — SODIUM CHLORIDE 9 MG/ML
INJECTION, SOLUTION INTRAVENOUS CONTINUOUS
Status: DISCONTINUED | OUTPATIENT
Start: 2018-12-31 | End: 2019-01-01

## 2018-12-31 RX ORDER — ONDANSETRON 2 MG/ML
4 INJECTION INTRAMUSCULAR; INTRAVENOUS EVERY 4 HOURS PRN
Status: DISCONTINUED | OUTPATIENT
Start: 2018-12-31 | End: 2019-01-03 | Stop reason: HOSPADM

## 2018-12-31 RX ORDER — BUTALBITAL, ASPIRIN, AND CAFFEINE 325; 50; 40 MG/1; MG/1; MG/1
1 CAPSULE ORAL EVERY 6 HOURS PRN
Status: ON HOLD | COMMUNITY
End: 2019-01-03

## 2018-12-31 RX ORDER — HYDROXYZINE PAMOATE 25 MG/1
25 CAPSULE ORAL 3 TIMES DAILY PRN
Status: DISCONTINUED | OUTPATIENT
Start: 2018-12-31 | End: 2018-12-31

## 2018-12-31 RX ORDER — DIVALPROEX SODIUM 500 MG/1
1500 TABLET, DELAYED RELEASE ORAL EVERY EVENING
Status: DISCONTINUED | OUTPATIENT
Start: 2018-12-31 | End: 2019-01-03 | Stop reason: HOSPADM

## 2018-12-31 RX ORDER — DIVALPROEX SODIUM 500 MG/1
500 TABLET, DELAYED RELEASE ORAL DAILY
Status: DISCONTINUED | OUTPATIENT
Start: 2018-12-31 | End: 2019-01-03 | Stop reason: HOSPADM

## 2018-12-31 RX ORDER — ASPIRIN 300 MG/1
300 SUPPOSITORY RECTAL DAILY
Status: DISCONTINUED | OUTPATIENT
Start: 2018-12-31 | End: 2019-01-03 | Stop reason: HOSPADM

## 2018-12-31 RX ORDER — ASPIRIN 81 MG/1
324 TABLET, CHEWABLE ORAL DAILY
Status: DISCONTINUED | OUTPATIENT
Start: 2018-12-31 | End: 2019-01-03 | Stop reason: HOSPADM

## 2018-12-31 RX ORDER — ASPIRIN 325 MG
325 TABLET ORAL DAILY
Status: DISCONTINUED | OUTPATIENT
Start: 2018-12-31 | End: 2019-01-03 | Stop reason: HOSPADM

## 2018-12-31 RX ADMIN — ENOXAPARIN SODIUM 40 MG: 100 INJECTION SUBCUTANEOUS at 06:05

## 2018-12-31 RX ADMIN — DIVALPROEX SODIUM 1500 MG: 500 TABLET, DELAYED RELEASE ORAL at 16:37

## 2018-12-31 RX ADMIN — VALPROATE SODIUM 2000 MG: 100 INJECTION, SOLUTION INTRAVENOUS at 00:44

## 2018-12-31 RX ADMIN — ACETAMINOPHEN 650 MG: 325 TABLET, FILM COATED ORAL at 16:36

## 2018-12-31 RX ADMIN — ATORVASTATIN CALCIUM 80 MG: 80 TABLET, FILM COATED ORAL at 16:36

## 2018-12-31 RX ADMIN — INFLUENZA A VIRUS A/MICHIGAN/45/2015 X-275 (H1N1) ANTIGEN (FORMALDEHYDE INACTIVATED), INFLUENZA A VIRUS A/SINGAPORE/INFIMH-16-0019/2016 IVR-186 (H3N2) ANTIGEN (FORMALDEHYDE INACTIVATED), INFLUENZA B VIRUS B/PHUKET/3073/2013 ANTIGEN (FORMALDEHYDE INACTIVATED), AND INFLUENZA B VIRUS B/MARYLAND/15/2016 BX-69A ANTIGEN (FORMALDEHYDE INACTIVATED) 0.5 ML: 15; 15; 15; 15 INJECTION, SUSPENSION INTRAMUSCULAR at 18:45

## 2018-12-31 RX ADMIN — MONTELUKAST SODIUM 10 MG: 10 TABLET, FILM COATED ORAL at 16:37

## 2018-12-31 RX ADMIN — ACETAMINOPHEN 650 MG: 325 TABLET, FILM COATED ORAL at 22:17

## 2018-12-31 RX ADMIN — SODIUM CHLORIDE 3000 MG: 9 INJECTION, SOLUTION INTRAVENOUS at 01:44

## 2018-12-31 RX ADMIN — BUSPIRONE HYDROCHLORIDE 15 MG: 10 TABLET ORAL at 16:38

## 2018-12-31 RX ADMIN — STANDARDIZED SENNA CONCENTRATE AND DOCUSATE SODIUM 2 TABLET: 8.6; 5 TABLET, FILM COATED ORAL at 16:35

## 2018-12-31 RX ADMIN — ASPIRIN 300 MG: 300 SUPPOSITORY RECTAL at 06:09

## 2018-12-31 RX ADMIN — SODIUM CHLORIDE: 9 INJECTION, SOLUTION INTRAVENOUS at 06:04

## 2018-12-31 ASSESSMENT — ENCOUNTER SYMPTOMS
PSYCHIATRIC NEGATIVE: 1
CONSTITUTIONAL NEGATIVE: 1
EYES NEGATIVE: 1
GASTROINTESTINAL NEGATIVE: 1
RESPIRATORY NEGATIVE: 1
CARDIOVASCULAR NEGATIVE: 1
NEUROLOGICAL NEGATIVE: 1
MUSCULOSKELETAL NEGATIVE: 1

## 2018-12-31 ASSESSMENT — COPD QUESTIONNAIRES
IN THE PAST 12 MONTHS DO YOU DO LESS THAN YOU USED TO BECAUSE OF YOUR BREATHING PROBLEMS: DISAGREE/UNSURE
COPD SCREENING SCORE: 0
DURING THE PAST 4 WEEKS HOW MUCH DID YOU FEEL SHORT OF BREATH: NONE/LITTLE OF THE TIME
DO YOU EVER COUGH UP ANY MUCUS OR PHLEGM?: NO/ONLY WITH OCCASIONAL COLDS OR INFECTIONS
HAVE YOU SMOKED AT LEAST 100 CIGARETTES IN YOUR ENTIRE LIFE: NO/DON'T KNOW

## 2018-12-31 ASSESSMENT — PAIN SCALES - GENERAL
PAINLEVEL_OUTOF10: 3
PAINLEVEL_OUTOF10: 6

## 2018-12-31 ASSESSMENT — LIFESTYLE VARIABLES
PACK_YEARS: 1/8
ALCOHOL_USE: NO
EVER_SMOKED: YES

## 2018-12-31 ASSESSMENT — PATIENT HEALTH QUESTIONNAIRE - PHQ9
1. LITTLE INTEREST OR PLEASURE IN DOING THINGS: NOT AT ALL
2. FEELING DOWN, DEPRESSED, IRRITABLE, OR HOPELESS: NOT AT ALL
SUM OF ALL RESPONSES TO PHQ9 QUESTIONS 1 AND 2: 0

## 2018-12-31 NOTE — ASSESSMENT & PLAN NOTE
Controlled on current medication regimen  - VPA levels were low low; loaded with IV VPA and Keppra on admission  EEG abnormal      Neurology to follow-up

## 2018-12-31 NOTE — ED TRIAGE NOTES
"Chief Complaint   Patient presents with   • Possible Stroke       BIB by EMS for above complaint. Patient lives at [facility]. Weakness started around 1200 today and patient had increase flaccidity on right hand and leg around 1700 today. Patient presented with right facial drop. Patient A+Ox4. Per patient family these symptoms have happen before with no evidence of a stroke. These symptoms have been known to last for up to a month. Last episode was last February.      Blood Pressure: 122/70, Pulse: 61, Respiration: 16, Height: 198.1 cm (6' 6\"), Weight: (!) 125.5 kg (276 lb 10.8 oz), BMI (Calculated): 31.97, BSA (Calculated): 2.6, O2 (LPM): 0, O2 Delivery: None (Room Air)      "

## 2018-12-31 NOTE — ED NOTES
Medicated with am meds. Pharmacy called to review rest of inpatient meds as pt is border in ED at this time awaiting room upstairs

## 2018-12-31 NOTE — ED NOTES
Unable to complete swallow eval with pt, fails screening d/t continue R side facial weakness. Discussed with admitting MD, speech consult placed, pt to remain NPO

## 2018-12-31 NOTE — H&P
Hospital Medicine History & Physical Note    Date of Service  12/31/2018    Primary Care Physician  ANIKET Chung    Consultants  None    Code Status  Full code     Chief Complaint  Numbness and weakness right side    History of Presenting Illness  38 y.o. male with prior history of bipolar 1 disorder which is controlled with current medication regimen, hypothyroidism on replacement therapy, and dyslipidemia on statin therapy was in his usual state of health until the day prior to admission.  He apparently woke around 9 in the morning, with numbness on his right side as well as weakness on the same side arm and feet he denies any headache or vision changes, no chest pain or shortness of breath, he does have some nausea, no diarrhea or constipation.  He reports no exacerbating or alleviating factors, and does not feel that he has had much improvement in his sensory or motor symptoms.     Review of Systems  Review of Systems   Constitutional: Negative.    HENT: Negative.    Eyes: Negative.    Respiratory: Negative.    Cardiovascular: Negative.    Gastrointestinal: Negative.    Genitourinary: Negative.    Musculoskeletal: Negative.    Skin: Negative.    Neurological: Negative.    Endo/Heme/Allergies: Negative.    Psychiatric/Behavioral: Negative.        Past Medical History   has a past medical history of Anxiety; ASTHMA; Bipolar 1 disorder (HCC); Depression; Fall; Glaucoma; Glaucoma (1982); Hypothyroidism; Indigestion; Mental disorder; Murmur; Pneumonia; Psychiatric problem (2002); S/P thyroidectomy; Seizure (HCC) (2010); Seizure disorder ([facility]); and Unspecified disorder of thyroid.    Surgical History   has a past surgical history that includes eye surgery; thyroid lobectomy; and other.     Family History  family history includes Heart Disease in his mother; Hypertension in his mother; Lung Disease in his mother; Stroke in his maternal grandmother.     Social History   reports that he quit smoking about 8  "months ago. His smoking use included Cigarettes and Cigars. He smoked 0.25 packs per day. He has never used smokeless tobacco. He reports that he uses drugs, including Inhaled. He reports that he does not drink alcohol.    Allergies  Allergies   Allergen Reactions   • Abilify Unspecified     \"Feeling tired, like I don't even know whats going on around me\"   • Fish      Pt reports fish causes him to be sick to his stomach         Medications  Prior to Admission Medications   Prescriptions Last Dose Informant Patient Reported? Taking?   Cholecalciferol ([facility] D3) 2000 UNIT Cap  MAR from Other Facility Yes No   Sig: Take 4,000 Units by mouth every bedtime.   EPINEPHrine (EPIPEN 2-SONDRA) 0.3 MG/0.3ML Solution Auto-injector solution for injection   No No   Si.3 mL by Intramuscular route as needed (Allergic reaction).   atorvastatin (LIPITOR) 80 MG tablet  MAR from Other Facility Yes No   Sig: Take 80 mg by mouth every evening.   busPIRone (BUSPAR) 10 MG Tab  MAR from Other Facility Yes No   Sig: Take 10 mg by mouth 3 times a day.   divalproex (DEPAKOTE) 500 MG Tablet Delayed Response  MAR from Other Facility Yes No   Sig: Take 500-1,500 mg by mouth 2 Times a Day. 500 mg AM  1500 mg PM   glimepiride (AMARYL) 4 MG Tab  MAR from Other Facility Yes No   Sig: Take 4 mg by mouth every morning.   hydrOXYzine pamoate (VISTARIL) 25 MG Cap  MAR from Other Facility Yes No   Sig: Take 25 mg by mouth 3 times a day as needed for Anxiety.   hydrocortisone 2.5 % Cream topical cream   No No   Sig: Apply 1 Application to affected area(s) 2 times a day.   levETIRAcetam (KEPPRA) 500 MG Tab   No No   Sig: Take 1 Tab by mouth 2 times a day.   levothyroxine (SYNTHROID) 125 MCG Tab  MAR from Other Facility No No   Sig: Take 1 Tab by mouth Every morning on an empty stomach.   loratadine (CLARITIN) 10 MG Tab   No No   Sig: Take 1 Tab by mouth every day.   metFORMIN (GLUCOPHAGE) 1000 MG tablet  MAR from Other Facility No No   Sig: Take 1 Tab by " mouth 2 times a day, with meals.   mirtazapine (REMERON) 30 MG Tab tablet  MAR from Other Facility Yes No   Sig: Take 15-30 mg by mouth every evening.   naproxen (NAPROSYN) 500 MG Tab   No No   Sig: Take 1 Tab by mouth 2 times a day, with meals.   ondansetron (ZOFRAN ODT) 4 MG TABLET DISPERSIBLE   No No   Sig: Take 1 Tab by mouth every 8 hours as needed for Nausea.   prazosin (MINIPRESS) 1 MG Cap  MAR from Other Facility Yes No   Sig: Take 1 mg by mouth every evening.   predniSONE (DELTASONE) 20 MG Tab   No No   Sig: Day 1-4:  Take 40 mg by mouth daily.      Facility-Administered Medications: None       Physical Exam  Pulse:  [59-67] 59  Resp:  [12-20] 12  BP: (122)/(70) 122/70    Physical Exam   Constitutional: He is oriented to person, place, and time. He appears well-developed and well-nourished. No distress.   HENT:   Head: Normocephalic and atraumatic.   Eyes: Pupils are equal, round, and reactive to light. EOM are normal.   Neck: Normal range of motion. Neck supple. No tracheal deviation present. No thyromegaly present.   Cardiovascular: Normal rate, regular rhythm and normal heart sounds.  Exam reveals no gallop and no friction rub.    No murmur heard.  Pulmonary/Chest: Effort normal and breath sounds normal. No respiratory distress. He has no wheezes.   Abdominal: Soft. Bowel sounds are normal. He exhibits no distension and no mass. There is no tenderness. There is no rebound and no guarding.   Musculoskeletal: Normal range of motion. He exhibits no edema.   Lymphadenopathy:     He has no cervical adenopathy.   Neurological: He is alert and oriented to person, place, and time. No cranial nerve deficit.   Decreased strength and right upper and lower extremity   Skin: Skin is warm and dry. He is not diaphoretic.   Psychiatric: He has a normal mood and affect.   Nursing note and vitals reviewed.      Laboratory:  Recent Labs      12/30/18   2237   WBC  10.1   RBC  4.41*   HEMOGLOBIN  13.5*   HEMATOCRIT  41.3*    MCV  93.7   MCH  30.6   MCHC  32.7*   RDW  43.2   PLATELETCT  302   MPV  10.3     Recent Labs      12/30/18 2237   SODIUM  138   POTASSIUM  3.8   CHLORIDE  102   CO2  25   GLUCOSE  82   BUN  25*   CREATININE  1.04   CALCIUM  10.0     Recent Labs      12/30/18 2237   ALTSGPT  11   ASTSGOT  12   ALKPHOSPHAT  61   TBILIRUBIN  0.2   GLUCOSE  82     Recent Labs      12/30/18 2237   APTT  29.6   INR  0.98             Recent Labs      12/30/18 2237   TROPONINI  <0.01       Urinalysis:    No results found     Imaging:  DX-CHEST-PORTABLE (1 VIEW)   Final Result         No acute cardiac or pulmonary abnormality is identified.      CT-CTA NECK WITH & W/O-POST PROCESSING   Final Result      CT angiogram of the neck within normal limits.      CT-CTA HEAD WITH & W/O-POST PROCESS   Final Result      CT angiogram of the [place] within normal limits.      CT-CEREBRAL PERFUSION ANALYSIS   Final Result      1.  Cerebral blood flow less than 30% likely representing completed infarct = 0.0 mL.      2.  T Max more than 6 seconds likely representing combination of completed infarct and ischemia = 0.0 mL.      3.  Mismatched volume likely representing ischemic brain/penumbra = 0.0 mL.      4.  Please note that the cerebral perfusion was performed on the limited brain tissue around the basal ganglia region. Infarct/ischemia outside the CT perfusion sections can be missed in this study.      CT-HEAD W/O   Final Result         NO ACUTE ABNORMALITIES ARE NOTED ON CT SCAN OF THE HEAD.      Decreased attenuation in the cerebral white matter likely indicates microvascular ischemic disease.      MR-BRAIN-W/O    (Results Pending)   MR-BRAIN-W/O    (Results Pending)         Assessment/Plan:  I anticipate this patient is appropriate for observation status at this time.    Acute right-sided weakness- (present on admission)   Assessment & Plan    Outside of window for TPA      Anemia- (present on admission)   Assessment & Plan     Normocytic anemia without apparent bleed. Transfuse if needed for hemoglobin less than 7 gm/dL       Class 1 obesity due to excess calories with serious comorbidity and body mass index (BMI) of 31.0 to 31.9 in adult- (present on admission)   Assessment & Plan    Body mass index is 31.97 kg/m².       Bipolar disorder (HCC)- (present on admission)   Assessment & Plan    Controlled on current medication regimn     Acquired hypothyroidism- (present on admission)   Assessment & Plan    Continue replacement therapy          VTE prophylaxis: SCD, lovenox

## 2018-12-31 NOTE — ASSESSMENT & PLAN NOTE
- outside tPA window per Neuro recs  - CVA r/o workup for CT perfusion scan suggestive; MRI brain unremarkable for acute findings  - A1c and lipid panel pending  - PT/OT/SLP eval pending  - cont ASA and Statin in meantime    - Neuro recs appreciated

## 2018-12-31 NOTE — DISCHARGE PLANNING
Medical Social Work    Referral: Stroke    Intervention: MSW responded to charge desk for possible stroke.  Pt is a 38 year old male brought in by [facility] from home.  Pt is Norm Prabhakar (: 1980).  Per [facility] pt's mom was with pt and requested a phone call.  Pt's mom is Brandi ([phone]).  MSW contacted pt's mom and informed her that pt arrived and pt's room number.  Pt's mom states that she will come to the ER later.  Pt to CT and then RD12.    Plan: SW will follow as needed.

## 2018-12-31 NOTE — ED NOTES
Dr. Mckenzie will be this patients attending physician starting at 0700.  Please page him with any updates/questions.

## 2018-12-31 NOTE — ED PROVIDER NOTES
"ED Provider Note    CHIEF COMPLAINT  Chief Complaint   Patient presents with   • Possible Stroke     BIB [facility]. Pt began having weakness around noon with flaccidity to R side around 1700, as well as R side facial droop/weakness        HPI    Primary care provider: ANIKET Chung   History obtained from: Patient, EMS and later from patient's mother  History limited by: None     Norm Prabhakar is a 38 y.o. male who presents to the ED by EMS from his assisted living facility as a stroke activation and was seen upon arrival.  Patient reports right arm numbness that he noticed surrounding noon today and then inability to move his entire right side along with decreased sensation around 5:00 tonight.  Limited initial history/review of systems/physical exam due to patient's clinical condition and he was taken emergently to radiology for stroke workup with CT scan.  Patient was reevaluated upon return from CT and mother also arrived to help provide history.  Patient apparently has had recurrences of similar symptoms and always on the right side ever since 2010 when he had a \"virus on his brain\" and no other causes found despite all the workup according to mother.  Mother reports that the symptoms can last up to a month.  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  He denies any fever.  He reports shortness of breath at times due to his asthma but not currently.  He denies nausea/vomiting/diarrhea/constipation/dysuria.  He denies any recent illness/cough/congestion.  Patient states he is unable to move his right side and he has decreased sensation along the entire right side of his body.  Patient does not think he had a seizure today and denies any recent injury or trauma.      REVIEW OF SYSTEMS  Please see HPI for pertinent positives/negatives.  All other systems reviewed and are negative.     PAST MEDICAL HISTORY  Past Medical History: " "  Diagnosis Date   • Seizure ([facility]) 2010   • Psychiatric problem 2002    PTSD   • Anxiety     BIPOLAR   • ASTHMA    • Bipolar 1 disorder ([facility])    • Depression    • Fall     passed out 2 wks ago   • Glaucoma    • Glaucoma 1982    both eyes/ blind on left eye   • Hypothyroidism    • Indigestion     once in a while   • Mental disorder     learning disabilities; speech impairment; developmental delays   • Murmur     since birth   • Pneumonia     remote   • S/P thyroidectomy    • Seizure disorder ([facility])    • Unspecified disorder of thyroid         SURGICAL HISTORY  Past Surgical History:   Procedure Laterality Date   • EYE SURGERY     • OTHER      Hernia Repair when he was 10 yrs old   • THYROID LOBECTOMY          SOCIAL HISTORY  Social History     Social History Main Topics   • Smoking status: Former Smoker     Packs/day: 0.25     Types: Cigarettes, Cigars     Quit date: 5/1/2018   • Smokeless tobacco: Never Used   • Alcohol use No   • Drug use: Yes     Types: Inhaled      Comment: marijuana   • Sexual activity: Not on file        FAMILY HISTORY  Family History   Problem Relation Age of Onset   • Hypertension Mother    • Heart Disease Mother    • Lung Disease Mother    • Stroke Maternal Grandmother         CURRENT MEDICATIONS  Home Medications    **Home medications have not yet been reviewed for this encounter**          ALLERGIES  Allergies   Allergen Reactions   • Abilify Unspecified     \"Feeling tired, like I don't even know whats going on around me\"   • Fish      Pt reports fish causes him to be sick to his stomach          PHYSICAL EXAM  VITAL SIGNS: /70   Pulse (!) 50   Resp 17   Ht 1.981 m (6' 6\")   Wt (!) 125.5 kg (276 lb 10.8 oz)   SpO2 94%   BMI 31.97 kg/m²  @NGHIA[[id number]::@     Pulse ox interpretation: 93% I interpret this pulse ox as normal     Cardiac monitor interpretation: Sinus rhythm with heart rate in the 60s as interpreted by me.  The patient presented with neurologic deficits and cardiac " monitor was ordered to monitor for dysrhythmia.    Constitutional: Well developed, well nourished, alert in no apparent distress, nontoxic appearance    HENT: No external signs of trauma, normocephalic, oropharynx moist and clear, nose normal    Eyes: Left eye is blind, right eye EOMI with visual fields intact grossly, conjunctiva without erythema, no discharge, no icterus    Neck: Soft and supple, trachea midline, no stridor, no tenderness, no LAD, no JVD, good ROM    Cardiovascular: Regular rate and rhythm, no murmurs/rubs/gallops, strong distal pulses and good perfusion    Thorax & Lungs: No respiratory distress, CTAB   Abdomen: Soft, nontender, nondistended, no guarding, no rebound, normal BS    Back: No CVAT    Extremities: No cyanosis, no edema, no gross deformity, good ROM, no tenderness, intact distal pulses with brisk cap refill    Skin: Warm, dry, no pallor/cyanosis, no rash noted    Lymphatic: No lymphadenopathy noted    Neuro: Alert and oriented to person, place, and time.  GCS 15.  CN II-XII grossly intact except for right facial droop.  Slurred speech.  5/5 strength on the left side, no movement of right upper and lower extremities, patient reports decreased to no sensation on the right side, no cerebellar signs  Psychiatric: Cooperative      NIH STROKE SCALE    1A: Level of Consciousness=0  Alert; keenly responsive 0  Arouses to minor stimulation +1  Requires repeated stimulation to arouse +2  Movements to pain +2  Postures or unresponsive +3    1B: Ask Month and Age=0  Both questions right 0  1 question right +1  0 questions right +2  Dysarthric/intubated/trauma/language barrier +1  Aphasic +2    1C: 'Blink Eyes' & 'Squeeze Hands'=0  (Pantomime commands if communication barrier)  Performs both tasks 0  Performs 1 task +1  Performs 0 tasks +2    2: Test Horizontal Extraocular Movements=0  Normal 0  Partial gaze palsy: can be overcome +1  Partial gaze palsy: corrects with oculocephalic reflex  +1  Forced gaze palsy: cannot be overcome +2    3: Test Visual Fields=0  No visual loss 0  Partial hemianopia +1  Complete hemianopia +2  Patient is bilaterally blind +3  Bilateral hemianopia +3    4: Test Facial Palsy=3  (Use grimace if obtunded)  Normal symmetry 0  Minor paralysis (flat nasolabial fold, smile asymetry) +1  Partial paralysis (lower face) +2  Unilateral complete paralysis (upper/lower face) +3  Bilateral complete paralysis (upper/lower face) +3    5A: Test Left Arm Motor Drift=0  No drift for 10 Seconds 0  Drift, but doesn't hit bed +1  Drift, hits bed +2  Some effort against gravity +2  No effort against gravity +3  No movement +4  Amputation/joint fusion 0    5B: Test Right Arm Motor Drift=4  No drift for 10 seconds 0  Drift, but doesn't hit bed +1  Drift, hits bed +2  Some effort against gravity +2  No effort against gravity +3  No movement +4  Amputation/joint fusion 0    6A: Test Left Leg Motor Drift=0  No drift for 5 Seconds 0  Drift, but doesn't hit bed +1  Drift, hits bed +2  Some effort against gravity +2  No effort against gravity +3  No movement +4  Amputation/joint fusion 0    6B: Test Right Leg Motor Drift=4  No drift for 5 Seconds 0  Drift, but doesn't hit bed +1  Drift, hits bed +2  Some effort against gravity +2  No effort against gravity +3  No movement +4  Amputation/joint fusion 0    7: Test Limb Ataxia =0  (FNF/Heel-Shin)  No ataxia 0  Ataxia in 1 limb +1  Ataxia in 2 limbs +2  Does not understand 0  Paralyzed 0  Amputation/joint fusion 0    8: Test Sensation=2  Normal; no sensory loss 0  Mild-moderate loss: less sharp/more dull +1  Mild-moderate loss: can sense being touched +1  Complete loss: cannot sense being touched at all +2  No response and quadriplegic +2  Coma/unresponsive +2    9: Test Language/Aphasia=0  Normal; no aphasia 0  Mild-moderate aphasia: some obvious changes, without significant limitation +1  Severe aphasia: fragmentary expression, inference needed, cannot  identify materials +2  Mute/global aphasia: no usable speech/auditory comprehension +3  Coma/unresponsive +3    10: Test Dysarthria=1  Normal 0  Mild-moderate dysarthria: slurring but can be understood +1  Severe dysarthria: unintelligble slurring or out of proportion to dysphasia +2  Mute/anarthric +2  Intubated/unable to test 0    11: Test Extinction/Inattention=0  No abnormality 0  Visual/tactile/auditory/spatial/personal inattention +1  Extinction to bilateral simultaneous stimulation +1  Profound martinez-inattention (ex: does not recognize own hand) +2  Extinction to >1 modality +2      NIH Stroke Scale=12        DIAGNOSTIC STUDIES / PROCEDURES    EKG  12 Lead EKG obtained at 2337 and interpreted by me:   Rate: 60   Rhythm: Sinus rhythm   Ectopy: None  Intervals: Normal   Axis: Normal   Q Waves: None   QRS: IVCD, late precordial R wave transition  ST segments: Normal  T Waves: Normal    Clinical Impression: Sinus rhythm without acute ischemic changes or dysrhythmia       LABS  All labs reviewed by me.     Results for orders placed or performed during the hospital encounter of 12/30/18   CBC WITH DIFFERENTIAL   Result Value Ref Range    WBC 10.1 4.8 - 10.8 K/uL    RBC 4.41 (L) 4.70 - 6.10 M/uL    Hemoglobin 13.5 (L) 14.0 - 18.0 g/dL    Hematocrit 41.3 (L) 42.0 - 52.0 %    MCV 93.7 81.4 - 97.8 fL    MCH 30.6 27.0 - 33.0 pg    MCHC 32.7 (L) 33.7 - 35.3 g/dL    RDW 43.2 35.9 - 50.0 fL    Platelet Count 302 164 - 446 K/uL    MPV 10.3 9.0 - 12.9 fL    Neutrophils-Polys 44.80 44.00 - 72.00 %    Lymphocytes 41.80 (H) 22.00 - 41.00 %    Monocytes 10.30 0.00 - 13.40 %    Eosinophils 2.00 0.00 - 6.90 %    Basophils 0.70 0.00 - 1.80 %    Immature Granulocytes 0.40 0.00 - 0.90 %    Nucleated RBC 0.00 /100 WBC    Neutrophils (Absolute) 4.55 1.82 - 7.42 K/uL    Lymphs (Absolute) 4.23 1.00 - 4.80 K/uL    Monos (Absolute) 1.04 (H) 0.00 - 0.85 K/uL    Eos (Absolute) 0.20 0.00 - 0.51 K/uL    Baso (Absolute) 0.07 0.00 - 0.12 K/uL     Immature Granulocytes (abs) 0.04 0.00 - 0.11 K/uL    NRBC (Absolute) 0.00 K/uL   COMP METABOLIC PANEL   Result Value Ref Range    Sodium 138 135 - 145 mmol/L    Potassium 3.8 3.6 - 5.5 mmol/L    Chloride 102 96 - 112 mmol/L    Co2 25 20 - 33 mmol/L    Anion Gap 11.0 0.0 - 11.9    Glucose 82 65 - 99 mg/dL    Bun 25 (H) 8 - 22 mg/dL    Creatinine 1.04 0.50 - 1.40 mg/dL    Calcium 10.0 8.5 - 10.5 mg/dL    AST(SGOT) 12 12 - 45 U/L    ALT(SGPT) 11 2 - 50 U/L    Alkaline Phosphatase 61 30 - 99 U/L    Total Bilirubin 0.2 0.1 - 1.5 mg/dL    Albumin 4.3 3.2 - 4.9 g/dL    Total Protein 6.5 6.0 - 8.2 g/dL    Globulin 2.2 1.9 - 3.5 g/dL    A-G Ratio 2.0 g/dL   PROTHROMBIN TIME   Result Value Ref Range    PT 13.1 12.0 - 14.6 sec    INR 0.98 0.87 - 1.13   APTT   Result Value Ref Range    APTT 29.6 24.7 - 36.0 sec   COD (ADULT)   Result Value Ref Range    ABO Grouping Only A     Rh Grouping Only POS     Antibody Screen-Cod NEG    TROPONIN   Result Value Ref Range    Troponin I <0.01 0.00 - 0.04 ng/mL   URINALYSIS CULTURE, IF INDICATED   Result Value Ref Range    Color Yellow     Character Clear     Specific Gravity 1.040 <1.035    Ph 7.0 5.0 - 8.0    Glucose Negative Negative mg/dL    Ketones Negative Negative mg/dL    Protein Negative Negative mg/dL    Bilirubin Negative Negative    Urobilinogen, Urine 0.2 Negative    Nitrite Negative Negative    Leukocyte Esterase Negative Negative    Occult Blood Negative Negative    Micro Urine Req see below    ESTIMATED GFR   Result Value Ref Range    GFR If African American >60 >60 mL/min/1.73 m 2    GFR If Non African American >60 >60 mL/min/1.73 m 2   VALPROIC ACID   Result Value Ref Range    Valproic Acid 20.5 (L) 50.0 - 100.0 ug/mL   EKG (NOW)   Result Value Ref Range    Report       [facility] Emergency Dept.    Test Date:  2018-12-30  Pt Name:    HOLLY KIM                 Department: ER  MRN:        [id number]                      Room:        12  Gender:     Male                          Technician: [id number]  :        1980                   Requested By:BALJEET MODI  Order #:    [id number]                    Reading MD: Baljeet Modi    Measurements  Intervals                                Axis  Rate:       60                           P:          48  LA:         176                          QRS:        29  QRSD:       116                          T:          17  QT:         376  QTc:        376    Interpretive Statements  INCOMPLETE ANALYSIS DUE TO MISSING DATA IN PRECORDIAL LEAD(S)  SINUS RHYTHM  NONSPECIFIC INTRAVENTRICULAR CONDUCTION DELAY  MISSING LEAD(S): V4  Compared to ECG 10/21/2018 19:45:26  No significant changes    Electronically Signed On 2018 4:34:09 PST by Baljeet Modi          RADIOLOGY  The radiologist's interpretation of all radiological studies have been reviewed by me.     DX-CHEST-PORTABLE (1 VIEW)   Final Result         No acute cardiac or pulmonary abnormality is identified.      CT-CTA NECK WITH & W/O-POST PROCESSING   Final Result      CT angiogram of the neck within normal limits.      CT-CTA HEAD WITH & W/O-POST PROCESS   Final Result      CT angiogram of the [place] within normal limits.      CT-CEREBRAL PERFUSION ANALYSIS   Final Result      1.  Cerebral blood flow less than 30% likely representing completed infarct = 0.0 mL.      2.  T Max more than 6 seconds likely representing combination of completed infarct and ischemia = 0.0 mL.      3.  Mismatched volume likely representing ischemic brain/penumbra = 0.0 mL.      4.  Please note that the cerebral perfusion was performed on the limited brain tissue around the basal ganglia region. Infarct/ischemia outside the CT perfusion sections can be missed in this study.      CT-HEAD W/O   Final Result         NO ACUTE ABNORMALITIES ARE NOTED ON CT SCAN OF THE HEAD.      Decreased attenuation in the cerebral white matter likely indicates microvascular ischemic disease.      MR-BRAIN-W/O     (Results Pending)   MR-BRAIN-W/O    (Results Pending)          COURSE & MEDICAL DECISION MAKING  Nursing notes, VS, PMSFHx reviewed in chart.     Review of past medical records shows the patient was last seen in this ED November 27, 2018 for throat swelling.  He was last admitted on August 18, 2018 for weakness and rash and discharged on August 21, 2018.      Differential diagnoses considered include but are not limited to: CVA/TIA/intracranial hemorrhage, meningitis/encephalitis, epidural abscess/mass, sepsis, cancer, Bell's palsy, neuropathy, seizure, psychogenic etiology       2340: D/W Dr. Longo, neurologist.  He would like to patient to receive Keppra and Depakote load and placed on seizure precaution.  He will order MRI and evaluate the patient.  He does not recommend thrombolytic for the patient.    0025: D/W Dr. Burt, hospitalist, who will admit patient      History and physical exam as above.  Patient was seen upon arrival as a stroke activation and was taken emergently to radiology for CT per stroke protocol.  Imaging studies with findings as above.  EKG without evidence of acute ischemic changes or dysrhythmia.  Labs were fairly unremarkable.  Neurology was consulted with the above recommendations.  Thrombolytic was considered but patient is not an eligible candidate.  I discussed the findings with the patient and his mother and they are agreeable to admission.  Discussed with Dr. Burt who graciously agreed to admit patient for further care.      CRITICAL CARE NOTE:  Total critical care time spent on this patient was 40 minutes exclusive of separately billable procedures.  This may include direct bedside patient care, speaking with family members, review of past medical records, reviewing the results of laboratory/diagnostic studies, consulting with other physicians, as well as evaluating the response to the therapy instituted.        FINAL IMPRESSION  1. Acute right-sided weakness Acute   2. Right sided  numbness Acute          DISPOSITION  Patient will be admitted by hospitalist for further care      Electronically signed by: Juan F Hendricks, 12/30/2018 10:39 PM      Portions of this record were made with voice recognition software.  Despite my review, spelling/grammar/context errors may still remain.  Interpretation of this chart should be taken in this context.

## 2018-12-31 NOTE — DISCHARGE PLANNING
PMR referral from Dr. Burt.    Stroke protocol MRI negative for acute event. Will follow for therapy recommendation for post acute therapy need. No physiatry consult ordered at this time.

## 2018-12-31 NOTE — CONSULTS
"38 yom lives in care facility with sudden r f/a/l numb/weak onset noontime, 2100 worsening, per mother this has happened multiple times in past since 2010 virus on brain workup extensive neg, gets resolution eventually each time.  Hx of seizures, on AED VPA, LEV adherent on both.  No other complaints.     Exam reported Alert, follows commands, right side f/a/l flaccid    Durable Medical Equipment-Specific Vitals  Vitals  Blood Pressure: 122/70  Pulse: 67  Respiration: 20  Pulse Oximetry: 96 %  Height: 198.1 cm (6' 6\")  Weight: (!) 125.5 kg (276 lb 10.8 oz)  DME  O2 (LPM): 0  O2 Delivery: None (Room Air)    CTB/A/P no acute, visualized. CTA N wnl.    POST ICTAL DEFICIT  NOT IVT CANDIDATE - TLKW >4.5HRS  NOT FABBY CANDIDATE- NO LVO    MRI BRAIN IN AM  LOAD VPA 2G IV (DEPACON)  LOAD LEV 3G IV  HOME DOSES RESTART IN AM  SZ PRECAUTIONS  EEG IN AM IF SYMPTOMS CONTINUE & MRI NEG    Referring Provider-  Juan F Hendricks D.O. Agrees as above      Jovan Longo M.D.  , Neurohospitalist & Stroke  Clinical Professor, [employer]  Diplomate, Neurology & Neuroimaging      "

## 2018-12-31 NOTE — PROGRESS NOTES
NEUROLOGY DAILY PROGRESS NOTE    IDENTIFYING DATA       A.  Patient: Norm Prabhakar, MRN: [id number], : 1980,        B.  Data source:  Patient, although he is a poor historian    Admission Date: 2018    Chief complaint:  Mr. Norm Prabhakar is a 38 y.o. Man who presented with right upper extremity, R lower extremity, and right facial numbness and weakness that started around 9pm on 2018 with no other associated symptoms.   He has a past medical history of seizure disorder on depakote 500mg am, 1500mg PM, PTSD, developmental delay, bipolar 1 disorder, asthma, hypothyroidism, DM, glaucoma who lives at an assisted living facility was brought in by EMS following onset of his right sided weakness and numbness.     Hospital Course:  CT head, CTA head and neck negative. MRI showed no acute infarct, hemorrhage, or mass. Echocardiogram and carotid USG from 2018 were unremarkable.   Low valoproic acid levels of 20.5.   TSH elevated on labs from 2018 to 23.67    Interval Update:  Patients symptoms have not improved or really changed in any way since they started. He has no other associated symptoms including no changes in vision, dizziness/lightheadedness, cardio-pulmonary, GI or  symptoms. Denies constitutional symptoms. Denies recent stressful events.     Review of systems:  . General: No weight change, fevers or chills, or nightsweats, or generalized fatigue.   . Head: No headaches or dizziness or lightheadedness  . Eyes: No vision changes, vision loss or pain  . Respiratory: No shortness of breath, cough, sputum, or wheezing  . Cardiac: No chest pain, palpitations, POTTER, or orthopnea  . Gastrointestinal: no nausea, vomiting, no abdominal pain or change in bowel habits  . Urinary: no dysuria, frequency, or hesitancy.  . Peripheral vascular: leg cramps  . Neurological: per HPI  . Musculoskeletal: has cramps in BLE, no atrophy, no joints pain or stiffness.  . Psychiatric: No anxiety or  depression  . Hematologic: no rash, no easy bleeding or bruising, no epistaxis.    Past Medical History:  Past Medical History:   Diagnosis Date   • Anxiety     BIPOLAR   • ASTHMA    • Bipolar 1 disorder (Formerly [facility])    • Depression    • Fall     passed out 2 wks ago   • Glaucoma    • Glaucoma 1982    both eyes/ blind on left eye   • Hypothyroidism    • Indigestion     once in a while   • Mental disorder     learning disabilities; speech impairment; developmental delays   • Murmur     since birth   • Pneumonia     remote   • Psychiatric problem 2002    PTSD   • S/P thyroidectomy    • Seizure (Formerly [facility]) 2010   • Seizure disorder (Formerly [facility])    • Unspecified disorder of thyroid      Patient Active Problem List   Diagnosis   • Asthma   • Bradycardia   • Hemiparesis ([facility]ly [facility])   • Glaucoma   • Acquired hypothyroidism   • Depression   • Paralysis on one side of body (Formerly [facility])   • Thyroid mass of unclear etiology   • Conversion disorder   • Anemia   • Right sided weakness   • Psychiatric problem   • Acute right-sided weakness   • Syncope   • Headache   • Patient non adherence   • Seizure ([facility]ly [facility])   • Weakness   • Bipolar disorder ([facility])   • Change in mental status   • Pansinusitis   • Depression   • PTSD (post-traumatic stress disorder)   • Right hemiparesis (Formerly [facility])   • Hyperglycemia   • Type 2 diabetes mellitus without complication, without long-term current use of insulin (Formerly [facility])   • Pre-ulcerative corn or callous   • Intractable abdominal pain   • Class 1 obesity due to excess calories with serious comorbidity and body mass index (BMI) of 31.0 to 31.9 in adult   • Urticaria of entire body   • HLD (hyperlipidemia)   • Leukocytosis, stress-, and streoid-related     Past Surgical History:   Procedure Laterality Date   • EYE SURGERY     • OTHER      Hernia Repair when he was 10 yrs old   • THYROID LOBECTOMY         Social History:   Social History   Substance Use Topics   • Smoking status: Former Smoker     Packs/day: 0.25     Types: Cigarettes, Cigars      "Quit date: 5/1/2018   • Smokeless tobacco: Never Used   • Alcohol use No       Family History:  Family History   Problem Relation Age of Onset   • Hypertension Mother    • Heart Disease Mother    • Lung Disease Mother    • Stroke Maternal Grandmother        Allergies:  Allergies   Allergen Reactions   • Abilify Unspecified     \"Feeling tired, like I don't even know whats going on around me\"   • Fish      Pt reports fish causes him to be sick to his stomach         Current Home Medications:  Prior to Admission medications    Medication Sig Start Date End Date Taking? Authorizing Provider   ASPIRIN-CAFFEINE-BUTALBITAL (FIORINAL) -40 MG Cap Take 1 Cap by mouth every 6 hours as needed.   Yes Physician Outpatient   sertraline (ZOLOFT) 100 MG Tab Take 100 mg by mouth every morning.   Yes Physician Outpatient   cephALEXin (KEFLEX) 500 MG Cap Take 500 mg by mouth 3 times a day. Completed 8 days on 12/15/18   Yes Physician Outpatient   triamcinolone acetonide (KENALOG) 0.1 % Cream Apply  to affected area(s) 3 times a day as needed.   Yes Physician Outpatient   nystatin (MYCOSTATIN) [id number] UNIT/GM Ointment Apply  to affected area(s) 2 times a day as needed (to left arm).   Yes Physician Outpatient   levothyroxine (SYNTHROID) 137 MCG Tab Take 137 mcg by mouth Every morning on an empty stomach.   Yes Physician Outpatient   montelukast (SINGULAIR) 10 MG Tab Take 10 mg by mouth every day.   Yes Physician Outpatient   raNITidine (ZANTAC) 150 MG Tab Take 150 mg by mouth 2 times a day.   Yes Physician Outpatient   busPIRone (BUSPAR) 15 MG tablet Take 15 mg by mouth 3 times a day.   Yes Physician Outpatient   prazosin (MINIPRESS) 1 MG Cap Take 1 mg by mouth every evening.    Physician Outpatient   glimepiride (AMARYL) 4 MG Tab Take 4 mg by mouth every morning.    Physician Outpatient   atorvastatin (LIPITOR) 80 MG tablet Take 80 mg by mouth every evening.    Physician Outpatient   Cholecalciferol ([employer] D3) 2000 UNIT Cap Take " "4,000 Units by mouth every evening.    Physician Outpatient   metFORMIN (GLUCOPHAGE) 1000 MG tablet Take 1 Tab by mouth 2 times a day, with meals. 2/5/18   Tamara Bronson M.D.   divalproex (DEPAKOTE) 500 MG Tablet Delayed Response Take 500-1,500 mg by mouth 2 Times a Day. 500 mg AM  1500 mg PM    Physician Outpatient        Physical Exam:  /70   Pulse (!) 56   Temp 36.6 °C (97.9 °F) (Temporal)   Resp 16   Ht 1.981 m (6' 6\")   Wt (!) 125.5 kg (276 lb 10.8 oz)   SpO2 96%   BMI 31.97 kg/m²     GEN: comfortable, in NAD  EYES: Pupil 4mm->2mm right eye to light direct and accommodation. Left eye not responsive   HEENT: non-icteric and non-injected eyes. moist conjunctivae; mild right lid-lag.  normocephalic. The nares are patent.  Oropharynx clear without lesions and normal hard and soft palates.   NECK: Trachea midline, supple, no notable bruit, No lymphadenopathy  CV: Normal rate and rhythm. No murmurs. No rubs, no S3, S4.  PULM: CTA Bilaterally, no wheezes or crackles, good inspiratory effort.   ABD:  soft, nontender, nondistended  Ext: no edema, Radial and dorsal petal pulses 2+ b/l  SKIN: LUE has reddish rash, which patient says is chronic from soap use, warm, dry  Psychiatric: normal mood, No active psychosis.     Neurological Examination:  1. Higher Functions: alert and oriented to self, place, time, slowed language, speech. Intact simple calculations.  2. Cranial Nerves:  CN I: Not examined  CN II:  fields to confrontation, right eye. LEFT eye blindness   CN III, IV, VI: EOMI; no nystagmus; right pupil 4mm->2mm to light direct and accommodation.   CN V: sensation decreased on right face compared to left  CN VII: face symmetric  CN VIII: hearing intact grossly  CN IX, X: palate elevates symmetrically  CN XI: SCMs & trapezii 4.5/5 bilaterally  CN XII: tongue protrudes midline  3. Motor: normal muscle bulks, normal tones, 5/5 strength in LUE and LLE. Patient not moving R side. However, on babinski reflex " patient moved right leg back because it was ticklish for him   4. Sensory: no sensation in RUE and RLE. Normal sensation in LLE and LUE  5. Coordination/Gait: slowed finger to nose of LUE and LLE test limited by repeated leg cramps per patient. Normal rapid motions. Patient not moving RUE or RLE  6. Involuntary Movements/Tremor: none  7. Reflexes: symmetric in BUE and BLE. No Babinski. No clonus.  8. Gait: not assessed     Results from last 7 days  Lab Units 12/30/18 2237   CO2 104 mmol/L 25    mg/dL 25*   CREATININE 109 mg/dL 1.04   GLUCOSE 112 mg/dL 82   CALCIUM 105 mg/dL 10.0   APTT 1602 sec 29.6   INR 1662  0.98       Recent Labs      12/30/18 2237   WBC  10.1   RBC  4.41*   HEMOGLOBIN  13.5*   HEMATOCRIT  41.3*   MCV  93.7   MCH  30.6   MCHC  32.7*   RDW  43.2   PLATELETCT  302   MPV  10.3     Recent Labs      12/30/18   2237   SODIUM  138   POTASSIUM  3.8   CHLORIDE  102   CO2  25   GLUCOSE  82   BUN  25*   CREATININE  1.04   CALCIUM  10.0         Recent Labs      12/30/18   2237   TROPONINI  <0.01         Recent Labs      12/30/18   2237   SODIUM  138   POTASSIUM  3.8   CHLORIDE  102   CO2  25   GLUCOSE  82   BUN  25*     Recent Labs      12/30/18   2237   SODIUM  138   POTASSIUM  3.8   CHLORIDE  102   CO2  25   BUN  25*   CREATININE  1.04   CALCIUM  10.0     Recent Labs      12/30/18   2237   APTT  29.6   INR  0.98         MR-BRAIN-W/O   Final Result      1.  No evidence of acute territorial infarct, intracranial hemorrhage or mass lesion.   2.  Unchanged diffuse confluent periventricular white matter signal abnormality throughout both hemispheres consistent with leukoencephalopathy.   3.  Mild diffuse cerebral substance loss.   4.  Redemonstrated enlargement and elongation of the left globe with possible small herniation of the left superolateral aspect which could be consistent with a staphyloma.      DX-CHEST-PORTABLE (1 VIEW)   Final Result         No acute cardiac or pulmonary abnormality is  identified.      CT-CTA NECK WITH & W/O-POST PROCESSING   Final Result      CT angiogram of the neck within normal limits.      CT-CTA HEAD WITH & W/O-POST PROCESS   Final Result      CT angiogram of the [place] within normal limits.      CT-CEREBRAL PERFUSION ANALYSIS   Final Result      1.  Cerebral blood flow less than 30% likely representing completed infarct = 0.0 mL.      2.  T Max more than 6 seconds likely representing combination of completed infarct and ischemia = 0.0 mL.      3.  Mismatched volume likely representing ischemic brain/penumbra = 0.0 mL.      4.  Please note that the cerebral perfusion was performed on the limited brain tissue around the basal ganglia region. Infarct/ischemia outside the CT perfusion sections can be missed in this study.      CT-HEAD W/O   Final Result         NO ACUTE ABNORMALITIES ARE NOTED ON CT SCAN OF THE HEAD.      Decreased attenuation in the cerebral white matter likely indicates microvascular ischemic disease.            ASSESSMENT & PLAN:  Mr. Norm Prabhakar is a 38 y.o. Man who presented with right upper extremity, R lower extremity, and right facial numbness and weakness that started around 9pm on 12/30/2018 with no other associated symptoms.   He has a past medical history of seizure disorder on depakote 500mg am, 1500mg PM, PTSD, developmental delay, bipolar 1 disorder, asthma, hypothyroidism, DM, glaucoma who lives at an assisted living facility was brought in by EMS following onset of his right sided weakness and numbness.     Of note, patient has h/o multiple ED visits/admissions like with similar neurological complaints or seizure complaints with negative imaging. He has had multiple abnormal EEGs reflective of possible seizure activity as well as focal seizure activity in the most recent EEG on 8/20/2018.     However, patients current symptoms are related to conversion disorder with negative CT head, CTA head and neck negative. MRI showed no  acute infarct, hemorrhage, or mass. Echocardiogram and carotid USG from 5/2018 were unremarkable. Additionally, during exam patient moved his RLE in response to babinski testing because it tickled his feet.   Unlikely related to seizure activity since symptoms would not correlate with seizure and patient says he has been compliant with his medications.     -Continue patients home anti epileptic drug, depakote  -okay to discharge following return of Right sided function, since this is likely conversion disorder  -needs psychiatry follow up as outpatient, or inpatient evaluation if symptoms are prolonged    Thank you for allowing neurology to participate in this patients care. Neurology will sign off at this time.

## 2018-12-31 NOTE — PROGRESS NOTES
Pt seen and examined.    37yo M with PMHx of Seizure D/O was admitted for acute right sided weakness.    Acquired hypothyroidism  Continue replacement therapy     Acute right-sided weakness  - outside tPA window per Neuro recs  - CVA r/o workup for CT perfusion scan suggestive; MRI brain unremarkable for acute findings  - A1c and lipid panel pending  - PT/OT/SLP eval pending  - cont ASA and Statin in meantime  - will obtain EEG (Hx of seizure disorder)  - Neuro recs appreciated    Anemia  Normocytic anemia without apparent bleed. Transfuse if needed for hemoglobin less than 7 gm/dL      Bipolar disorder (HCC)  Controlled on current medication regimn    Class 1 obesity due to excess calories with serious comorbidity and body mass index (BMI) of 31.0 to 31.9 in adult  Body mass index is 31.97 kg/m².      Seizure (HCC)  Controlled on current medication regimen  - VPA levels low; loaded with IV VPA and Keppra  - if MRI brain negative, will obtain EEG  - Neuro recs appreciated

## 2018-12-31 NOTE — THERAPY
"  Speech Language Therapy Clinical Swallow Evaluation completed.  Functional Status: Pt seen on this date for clinical swallow evaluation in ED no rooms available on floor at this time. Pt A&Ox4 and agreeable to evaluation. Right-sided facial droop, reduced lingual/labial ROM, underbite, and slowed imprecise speech noted during oral motor evaluation. Pt reporting teeth sensitivity through out session and stated it was easier to chew on right side of mouth than left. PO trials of single ice chips, NTL via teaspoon and cup sip, puree, pudding, soft solids, mixed consistencies, and thin liquids via teaspoon, cup sip, straw sip, and consecutive straw sip and no overt s/sx of aspiration noted. Pt with mildly delayed swallow trigger with teaspoons of liquid bolus, however, with straw sip, swallow trigger was timely and appropriate. Upon palpation, laryngeal elevation noted to be weak but complete. Pt unable to self feed due to pain in left arm from IV and difficulty moving right arm so this clinician had to 1:1 feed pt. At this time, would recommend that pt begin a dysphagia III/thin liquid diet due to teeth sensitivity. Would recommend that pt begin a dysphagia III/thin liquid diet with direct supervision/assistance with feeding as necessary. Dysphagia education provided to pt and he verbalized understanding. SLP to follow.    Recommendations - Diet:  Dysphagia III/thin liquid                          Strategies: Strict 1:1 feeding , Direct supervision during meals, Assistance needed for meal tray set-up and Head of Bed at 90 Degrees                          Medication Administration:  Whole with liquid wash  Plan of Care: Will benefit from Speech Therapy 3 times per week  Post-Acute Therapy: Recommend inpatient transitional care services for continued speech therapy services.        See \"Rehab Therapy-Acute\" Patient Summary Report for complete documentation.   "

## 2018-12-31 NOTE — PROCEDURES
EEG 12/31/18 3:27 PM    VIDEO ELECTROENCEPHALOGRAM / EPILEPSY MONITORING UNIT REPORT      Referring provider: DR. TUBBS    DOS:   12/31/2018      INDICATION:  Norm Prabhakar 38 y.o. male presenting with Numbness and weakness right side    CURRENT ANTIEPILEPTIC REGIMEN: Depakote     DURATION: 35 minutes      TECHNIQUE: A 30-channel video electroencephalogram (VEEG) was performed in accordance with the international 10-20 system. This digital study was reviewed in bipolar and referential montages. The recording examined the patient during wakeful, drowsy and sleep states.         DESCRIPTION OF THE RECORD:  During the awake state, background shows symmetrical 9-10 Hz alpha activity posteriorly with amplitude of 70 mV.  There was reactivity with eye opening/closure.  Normal anterior-posterior gradient was noted with faster beta frequencies seen anteriorly.  During drowsiness, high-amplitude delta frequencies were seen. There are prolonged TIRDA over both temporal, slightly more over right, but also in the left    During the sleep state, background shows diffuse high-amplitude 4-5 Hz delta activity.  Symmetrical high-amplitude sleep spindles and vertex sharp activities were seen in the central leads.    ACTIVATION PROCEDURES:   Hyperventilation was  performed by the patient. The technician performing the test noted good effort. This technique produced electroencephalographic changes in keeping with the expected bilaterally synchronous, frontally predominant, high amplitude slow waves build up.     Intermittent Photic stimulation was performed in a stepwise fashion from 1 to 30 Hz and elicited a normal response (photic driving), most noticeable in the posterior leads.      ICTAL AND/OR INTERICTAL FINDINGS:   No focal or generalized epileptiform activity was noted.There are prolonged TIRDA over both temporal, slightly more over right, but also in the left No seizures were recorded during the study.     EVENT(S):   NONE    EKG: sampling review of EKG recording demonstrated regular rhythm      INTERPRETATION:         ________________________________________________________________________    This is abnormal routine video EEG recording in the awake and drowsy/sleep state(s).    This scalp EEG denotes      1. Focal cortical dysfunction over bi-temporal --this patten would increase the risk of temporal seizure - advise clinical correlation regarding management     If clinical suspicion of active seizure remains high.  Prolonged EEG   monitoring may be of help.    EEG 12/31/18 3:33 PM    ________________________________________________________________________  ________________________________________________________________________      REFERENCE    EEG is described as 'abnormal' (that is 'not normal' brain activity) does not 'prove' that the person has epilepsy and does not mean the patient needs treatment. ... Also, many people who do have epilepsy will only have 'abnormal' activity on the EEG if they have a seizure at the time the test is happening.     ________________________________________________________________________          TIRDA vs RMTD-- I prefer to call this TIRDA

## 2019-01-01 LAB
ANION GAP SERPL CALC-SCNC: 14 MMOL/L (ref 0–11.9)
BASOPHILS # BLD AUTO: 0.8 % (ref 0–1.8)
BASOPHILS # BLD: 0.07 K/UL (ref 0–0.12)
BUN SERPL-MCNC: 19 MG/DL (ref 8–22)
CALCIUM SERPL-MCNC: 9 MG/DL (ref 8.5–10.5)
CHLORIDE SERPL-SCNC: 103 MMOL/L (ref 96–112)
CHOLEST SERPL-MCNC: 209 MG/DL (ref 100–199)
CO2 SERPL-SCNC: 22 MMOL/L (ref 20–33)
CREAT SERPL-MCNC: 0.92 MG/DL (ref 0.5–1.4)
EOSINOPHIL # BLD AUTO: 0.19 K/UL (ref 0–0.51)
EOSINOPHIL NFR BLD: 2.2 % (ref 0–6.9)
ERYTHROCYTE [DISTWIDTH] IN BLOOD BY AUTOMATED COUNT: 42.8 FL (ref 35.9–50)
GLUCOSE SERPL-MCNC: 153 MG/DL (ref 65–99)
HCT VFR BLD AUTO: 40.3 % (ref 42–52)
HDLC SERPL-MCNC: 32 MG/DL
HGB BLD-MCNC: 13.2 G/DL (ref 14–18)
IMM GRANULOCYTES # BLD AUTO: 0.04 K/UL (ref 0–0.11)
IMM GRANULOCYTES NFR BLD AUTO: 0.5 % (ref 0–0.9)
LDLC SERPL CALC-MCNC: ABNORMAL MG/DL
LEVETIRACETAM SERPL-MCNC: <2 UG/ML (ref 12–46)
LYMPHOCYTES # BLD AUTO: 4.02 K/UL (ref 1–4.8)
LYMPHOCYTES NFR BLD: 46.1 % (ref 22–41)
MCH RBC QN AUTO: 30.5 PG (ref 27–33)
MCHC RBC AUTO-ENTMCNC: 32.8 G/DL (ref 33.7–35.3)
MCV RBC AUTO: 93.1 FL (ref 81.4–97.8)
MONOCYTES # BLD AUTO: 0.67 K/UL (ref 0–0.85)
MONOCYTES NFR BLD AUTO: 7.7 % (ref 0–13.4)
NEUTROPHILS # BLD AUTO: 3.73 K/UL (ref 1.82–7.42)
NEUTROPHILS NFR BLD: 42.7 % (ref 44–72)
NRBC # BLD AUTO: 0 K/UL
NRBC BLD-RTO: 0 /100 WBC
PLATELET # BLD AUTO: 292 K/UL (ref 164–446)
PMV BLD AUTO: 9.9 FL (ref 9–12.9)
POTASSIUM SERPL-SCNC: 3.9 MMOL/L (ref 3.6–5.5)
RBC # BLD AUTO: 4.33 M/UL (ref 4.7–6.1)
SODIUM SERPL-SCNC: 139 MMOL/L (ref 135–145)
TRIGL SERPL-MCNC: 416 MG/DL (ref 0–149)
WBC # BLD AUTO: 8.7 K/UL (ref 4.8–10.8)

## 2019-01-01 PROCEDURE — 700102 HCHG RX REV CODE 250 W/ 637 OVERRIDE(OP): Performed by: HOSPITALIST

## 2019-01-01 PROCEDURE — 80048 BASIC METABOLIC PNL TOTAL CA: CPT

## 2019-01-01 PROCEDURE — 770006 HCHG ROOM/CARE - MED/SURG/GYN SEMI*

## 2019-01-01 PROCEDURE — A9270 NON-COVERED ITEM OR SERVICE: HCPCS | Performed by: HOSPITALIST

## 2019-01-01 PROCEDURE — 80061 LIPID PANEL: CPT

## 2019-01-01 PROCEDURE — 97166 OT EVAL MOD COMPLEX 45 MIN: CPT

## 2019-01-01 PROCEDURE — 700105 HCHG RX REV CODE 258: Performed by: HOSPITALIST

## 2019-01-01 PROCEDURE — 99231 SBSQ HOSP IP/OBS SF/LOW 25: CPT | Performed by: PSYCHIATRY & NEUROLOGY

## 2019-01-01 PROCEDURE — 85025 COMPLETE CBC W/AUTO DIFF WBC: CPT

## 2019-01-01 PROCEDURE — 97162 PT EVAL MOD COMPLEX 30 MIN: CPT

## 2019-01-01 PROCEDURE — 700111 HCHG RX REV CODE 636 W/ 250 OVERRIDE (IP): Performed by: HOSPITALIST

## 2019-01-01 PROCEDURE — 36415 COLL VENOUS BLD VENIPUNCTURE: CPT

## 2019-01-01 PROCEDURE — 99232 SBSQ HOSP IP/OBS MODERATE 35: CPT | Performed by: HOSPITALIST

## 2019-01-01 RX ORDER — BUSPIRONE HYDROCHLORIDE 10 MG/1
15 TABLET ORAL 3 TIMES DAILY
Status: DISCONTINUED | OUTPATIENT
Start: 2019-01-02 | End: 2019-01-03 | Stop reason: HOSPADM

## 2019-01-01 RX ORDER — NYSTATIN 100000 U/G
CREAM TOPICAL 2 TIMES DAILY
Status: DISCONTINUED | OUTPATIENT
Start: 2019-01-01 | End: 2019-01-03 | Stop reason: HOSPADM

## 2019-01-01 RX ADMIN — SERTRALINE 100 MG: 100 TABLET, FILM COATED ORAL at 05:13

## 2019-01-01 RX ADMIN — LEVOTHYROXINE SODIUM 137 MCG: 137 TABLET ORAL at 05:13

## 2019-01-01 RX ADMIN — SODIUM CHLORIDE: 9 INJECTION, SOLUTION INTRAVENOUS at 10:30

## 2019-01-01 RX ADMIN — DIVALPROEX SODIUM 500 MG: 500 TABLET, DELAYED RELEASE ORAL at 05:13

## 2019-01-01 RX ADMIN — PRAZOSIN HYDROCHLORIDE 1 MG: 1 CAPSULE ORAL at 20:32

## 2019-01-01 RX ADMIN — BUSPIRONE HYDROCHLORIDE 15 MG: 10 TABLET ORAL at 12:00

## 2019-01-01 RX ADMIN — DIVALPROEX SODIUM 1500 MG: 500 TABLET, DELAYED RELEASE ORAL at 17:02

## 2019-01-01 RX ADMIN — BUSPIRONE HYDROCHLORIDE 15 MG: 10 TABLET ORAL at 05:12

## 2019-01-01 RX ADMIN — NYSTATIN: 100000 CREAM TOPICAL at 17:03

## 2019-01-01 RX ADMIN — ENOXAPARIN SODIUM 40 MG: 100 INJECTION SUBCUTANEOUS at 05:12

## 2019-01-01 RX ADMIN — MONTELUKAST SODIUM 10 MG: 10 TABLET, FILM COATED ORAL at 20:32

## 2019-01-01 RX ADMIN — ASPIRIN 325 MG: 325 TABLET ORAL at 05:13

## 2019-01-01 RX ADMIN — BUSPIRONE HYDROCHLORIDE 15 MG: 10 TABLET ORAL at 17:02

## 2019-01-01 RX ADMIN — ATORVASTATIN CALCIUM 80 MG: 80 TABLET, FILM COATED ORAL at 20:32

## 2019-01-01 ASSESSMENT — COGNITIVE AND FUNCTIONAL STATUS - GENERAL
MOVING FROM LYING ON BACK TO SITTING ON SIDE OF FLAT BED: A LOT
DAILY ACTIVITIY SCORE: 13
DRESSING REGULAR UPPER BODY CLOTHING: A LOT
EATING MEALS: A LITTLE
TOILETING: A LITTLE
MOBILITY SCORE: 12
MOVING TO AND FROM BED TO CHAIR: A LOT
DRESSING REGULAR LOWER BODY CLOTHING: A LOT
TURNING FROM BACK TO SIDE WHILE IN FLAT BAD: A LITTLE
SUGGESTED CMS G CODE MODIFIER DAILY ACTIVITY: CL
STANDING UP FROM CHAIR USING ARMS: A LOT
SUGGESTED CMS G CODE MODIFIER DAILY ACTIVITY: CK
WALKING IN HOSPITAL ROOM: A LOT
MOVING FROM LYING ON BACK TO SITTING ON SIDE OF FLAT BED: UNABLE
EATING MEALS: A LITTLE
SUGGESTED CMS G CODE MODIFIER MOBILITY: CL
STANDING UP FROM CHAIR USING ARMS: A LOT
PERSONAL GROOMING: A LITTLE
HELP NEEDED FOR BATHING: A LOT
HELP NEEDED FOR BATHING: A LOT
MOBILITY SCORE: 8
DAILY ACTIVITIY SCORE: 16
DRESSING REGULAR LOWER BODY CLOTHING: TOTAL
CLIMB 3 TO 5 STEPS WITH RAILING: TOTAL
MOVING TO AND FROM BED TO CHAIR: UNABLE
SUGGESTED CMS G CODE MODIFIER MOBILITY: CM
PERSONAL GROOMING: A LITTLE
TURNING FROM BACK TO SIDE WHILE IN FLAT BAD: UNABLE
TOILETING: A LOT
DRESSING REGULAR UPPER BODY CLOTHING: A LITTLE
CLIMB 3 TO 5 STEPS WITH RAILING: TOTAL
WALKING IN HOSPITAL ROOM: A LOT

## 2019-01-01 ASSESSMENT — PAIN SCALES - GENERAL
PAINLEVEL_OUTOF10: 4
PAINLEVEL_OUTOF10: 0

## 2019-01-01 ASSESSMENT — ENCOUNTER SYMPTOMS
CARDIOVASCULAR NEGATIVE: 1
FOCAL WEAKNESS: 1
RESPIRATORY NEGATIVE: 1
MUSCULOSKELETAL NEGATIVE: 1
GASTROINTESTINAL NEGATIVE: 1
EYES NEGATIVE: 1
PSYCHIATRIC NEGATIVE: 1

## 2019-01-01 ASSESSMENT — ACTIVITIES OF DAILY LIVING (ADL): TOILETING: INDEPENDENT

## 2019-01-01 ASSESSMENT — GAIT ASSESSMENTS: GAIT LEVEL OF ASSIST: UNABLE TO PARTICIPATE

## 2019-01-01 NOTE — CONSULTS
Admission Date: 12/30/2018    CC: R side weakness     Chief complaint:  Mr. Norm Prabhakar is a 38 y.o. Man who presented with right upper extremity, R lower extremity, and right facial numbness and weakness that started around 9pm on 12/30/2018 with no other associated symptoms.   He has a past medical history of seizure disorder on depakote 500mg am, 1500mg PM, PTSD, developmental delay, bipolar 1 disorder, asthma, hypothyroidism, DM, glaucoma who lives at an assisted living facility was brought in by EMS following onset of his right sided weakness and numbness.      Hospital Course:  CT head, CTA head and neck negative. MRI showed no acute infarct, hemorrhage, or mass. Echocardiogram and carotid USG from 5/2018 were unremarkable.   Low valoproic acid levels of 20.5.   TSH elevated on labs from 11/27/2018 to 23.67     Interval Update:  Patients symptoms have not improved or really changed in any way since they started. He has no other associated symptoms including no changes in vision, dizziness/lightheadedness, cardio-pulmonary, GI or  symptoms. Denies constitutional symptoms. Denies recent stressful events.      Review of systems:  . General: No weight change, fevers or chills, or nightsweats, or generalized fatigue.   . Head: No headaches or dizziness or lightheadedness  . Eyes: No vision changes, vision loss or pain  . Respiratory: No shortness of breath, cough, sputum, or wheezing  . Cardiac: No chest pain, palpitations, POTTER, or orthopnea  . Gastrointestinal: no nausea, vomiting, no abdominal pain or change in bowel habits  . Urinary: no dysuria, frequency, or hesitancy.  . Peripheral vascular: leg cramps  . Neurological: per HPI  . Musculoskeletal: has cramps in BLE, no atrophy, no joints pain or stiffness.  . Psychiatric: No anxiety or depression  . Hematologic: no rash, no easy bleeding or bruising, no epistaxis.    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   • Hypothyroidism     • Indigestion       once in a while   • Mental disorder       learning disabilities; speech impairment; developmental delays   • Murmur       since birth   • Pneumonia       remote   • Psychiatric problem 2002     PTSD   • S/P thyroidectomy     • Seizure ([facility]) 2010   • Seizure disorder ([facility])     • Unspecified disorder of thyroid               Patient Active Problem List   Diagnosis   • Asthma   • Bradycardia   • Hemiparesis ([facility])   • Glaucoma   • Acquired hypothyroidism   • Depression   • Paralysis on one side of body ([facility])   • Thyroid mass of unclear etiology   • Conversion disorder   • Anemia   • Right sided weakness   • Psychiatric problem   • Acute right-sided weakness   • Syncope   • Headache   • Patient non adherence   • Seizure ([facility])   • Weakness   • Bipolar disorder ([facility])   • Change in mental status   • Pansinusitis   • Depression   • PTSD (post-traumatic stress disorder)   • Right hemiparesis ([facility])   • Hyperglycemia   • Type 2 diabetes mellitus without complication, without long-term current use of insulin ([facility])   • Pre-ulcerative corn or callous   • Intractable abdominal pain   • Class 1 obesity due to excess calories with serious comorbidity and body mass index (BMI) of 31.0 to 31.9 in adult   • Urticaria of entire body   • HLD (hyperlipidemia)   • Leukocytosis, stress-, and streoid-related      Past Surgical History         Past Surgical History:   Procedure Laterality Date   • EYE SURGERY       • OTHER         Hernia Repair when he was 10 yrs old   • THYROID LOBECTOMY                Social History:         Social History   Substance Use Topics   • Smoking status: Former Smoker       Packs/day: 0.25       Types: Cigarettes, Cigars       Quit date: 5/1/2018   • Smokeless tobacco:  "Never Used   • Alcohol use No         Family History:  Family History         Family History   Problem Relation Age of Onset   • Hypertension Mother     • Heart Disease Mother     • Lung Disease Mother     • Stroke Maternal Grandmother              Allergies:        Allergies   Allergen Reactions   • Abilify Unspecified       \"Feeling tired, like I don't even know whats going on around me\"   • Fish         Pt reports fish causes him to be sick to his stomach            Current Home Medications:          Prior to Admission medications    Medication Sig Start Date End Date Taking? Authorizing Provider   ASPIRIN-CAFFEINE-BUTALBITAL (FIORINAL) -40 MG Cap Take 1 Cap by mouth every 6 hours as needed.     Yes Physician Outpatient   sertraline (ZOLOFT) 100 MG Tab Take 100 mg by mouth every morning.     Yes Physician Outpatient   cephALEXin (KEFLEX) 500 MG Cap Take 500 mg by mouth 3 times a day. Completed 8 days on 12/15/18     Yes Physician Outpatient   triamcinolone acetonide (KENALOG) 0.1 % Cream Apply  to affected area(s) 3 times a day as needed.     Yes Physician Outpatient   nystatin (MYCOSTATIN) 522996 UNIT/GM Ointment Apply  to affected area(s) 2 times a day as needed (to left arm).     Yes Physician Outpatient   levothyroxine (SYNTHROID) 137 MCG Tab Take 137 mcg by mouth Every morning on an empty stomach.     Yes Physician Outpatient   montelukast (SINGULAIR) 10 MG Tab Take 10 mg by mouth every day.     Yes Physician Outpatient   raNITidine (ZANTAC) 150 MG Tab Take 150 mg by mouth 2 times a day.     Yes Physician Outpatient   busPIRone (BUSPAR) 15 MG tablet Take 15 mg by mouth 3 times a day.     Yes Physician Outpatient   prazosin (MINIPRESS) 1 MG Cap Take 1 mg by mouth every evening.       Physician Outpatient   glimepiride (AMARYL) 4 MG Tab Take 4 mg by mouth every morning.       Physician Outpatient   atorvastatin (LIPITOR) 80 MG tablet Take 80 mg by mouth every evening.       Physician Outpatient " "  Cholecalciferol ([employer] D3) 2000 UNIT Cap Take 4,000 Units by mouth every evening.       Physician Outpatient   metFORMIN (GLUCOPHAGE) 1000 MG tablet Take 1 Tab by mouth 2 times a day, with meals. 2/5/18     Tamara Bronson M.D.   divalproex (DEPAKOTE) 500 MG Tablet Delayed Response Take 500-1,500 mg by mouth 2 Times a Day. 500 mg AM  1500 mg PM       Physician Outpatient         Physical Exam:  /70   Pulse (!) 56   Temp 36.6 °C (97.9 °F) (Temporal)   Resp 16   Ht 1.981 m (6' 6\")   Wt (!) 125.5 kg (276 lb 10.8 oz)   SpO2 96%   BMI 31.97 kg/m²      GEN: comfortable, in NAD  EYES: Pupil 4mm->2mm right eye to light direct and accommodation. Left eye not responsive   HEENT: non-icteric and non-injected eyes. moist conjunctivae; mild right lid-lag.  normocephalic. The nares are patent.  Oropharynx clear without lesions and normal hard and soft palates.   NECK: Trachea midline, supple, no notable bruit, No lymphadenopathy  CV: Normal rate and rhythm. No murmurs. No rubs, no S3, S4.  PULM: CTA Bilaterally, no wheezes or crackles, good inspiratory effort.   ABD:  soft, nontender, nondistended  Ext: no edema, Radial and dorsal petal pulses 2+ b/l  SKIN: LUE has reddish rash, which patient says is chronic from soap use, warm, dry  Psychiatric: normal mood, No active psychosis.      Neurological Examination:  1. Higher Functions: alert and oriented to self, place, time, slowed language, speech. Intact simple calculations.  2. Cranial Nerves:  CN I: Not examined  CN II:  fields to confrontation, right eye. LEFT eye blindness   CN III, IV, VI: EOMI; no nystagmus; right pupil 4mm->2mm to light direct and accommodation.   CN V: sensation decreased on right face compared to left  CN VII: face symmetric  CN VIII: hearing intact grossly  CN IX, X: palate elevates symmetrically  CN XI: SCMs & trapezii 4.5/5 bilaterally  CN XII: tongue protrudes midline  3. Motor: normal muscle bulks, normal tones, 5/5 strength in LUE and " LLE. Patient not moving R side. However, on babinski reflex patient moved right leg back because it was ticklish for him   4. Sensory: no sensation in RUE and RLE. Normal sensation in LLE and LUE  5. Coordination/Gait: slowed finger to nose of LUE and LLE test limited by repeated leg cramps per patient. Normal rapid motions. Patient not moving RUE or RLE  6. Involuntary Movements/Tremor: none  7. Reflexes: symmetric in BUE and BLE. No Babinski. No clonus.  8. Gait: not assessed      Results from last 7 days  Lab Units 12/30/18 2237   CO2 104 mmol/L 25    mg/dL 25*   CREATININE 109 mg/dL 1.04   GLUCOSE 112 mg/dL 82   CALCIUM 105 mg/dL 10.0   APTT 1602 sec 29.6   INR 1662   0.98             Recent Labs      12/30/18 2237   WBC  10.1   RBC  4.41*   HEMOGLOBIN  13.5*   HEMATOCRIT  41.3*   MCV  93.7   MCH  30.6   MCHC  32.7*   RDW  43.2   PLATELETCT  302   MPV  10.3          Recent Labs      12/30/18   2237   SODIUM  138   POTASSIUM  3.8   CHLORIDE  102   CO2  25   GLUCOSE  82   BUN  25*   CREATININE  1.04   CALCIUM  10.0              Recent Labs      12/30/18   2237   TROPONINI  <0.01              Recent Labs      12/30/18   2237   SODIUM  138   POTASSIUM  3.8   CHLORIDE  102   CO2  25   GLUCOSE  82   BUN  25*          Recent Labs      12/30/18   2237   SODIUM  138   POTASSIUM  3.8   CHLORIDE  102   CO2  25   BUN  25*   CREATININE  1.04   CALCIUM  10.0          Recent Labs      12/30/18   2237   APTT  29.6   INR  0.98         MR-BRAIN-W/O   Final Result       1.  No evidence of acute territorial infarct, intracranial hemorrhage or mass lesion.   2.  Unchanged diffuse confluent periventricular white matter signal abnormality throughout both hemispheres consistent with leukoencephalopathy.   3.  Mild diffuse cerebral substance loss.   4.  Redemonstrated enlargement and elongation of the left globe with possible small herniation of the left superolateral aspect which could be consistent with a staphyloma.        DX-CHEST-PORTABLE (1 VIEW)   Final Result           No acute cardiac or pulmonary abnormality is identified.       CT-CTA NECK WITH & W/O-POST PROCESSING   Final Result       CT angiogram of the neck within normal limits.       CT-CTA HEAD WITH & W/O-POST PROCESS   Final Result       CT angiogram of the [place] within normal limits.       CT-CEREBRAL PERFUSION ANALYSIS   Final Result       1.  Cerebral blood flow less than 30% likely representing completed infarct = 0.0 mL.       2.  T Max more than 6 seconds likely representing combination of completed infarct and ischemia = 0.0 mL.       3.  Mismatched volume likely representing ischemic brain/penumbra = 0.0 mL.       4.  Please note that the cerebral perfusion was performed on the limited brain tissue around the basal ganglia region. Infarct/ischemia outside the CT perfusion sections can be missed in this study.       CT-HEAD W/O   Final Result           NO ACUTE ABNORMALITIES ARE NOTED ON CT SCAN OF THE HEAD.       Decreased attenuation in the cerebral white matter likely indicates microvascular ischemic disease.                ASSESSMENT & PLAN:    Final diagnosis and assessment:     Recurrent hemiparesis: Almost every 6 months since 2015, and being diagnosed as conversive disorder in the past.  Now, of same characteristics, as previous presentations.  Nothing objective in the examination as no increased reflexes, no babinsky   MRI brain again negative, and EEG with mild temporal dysfunction, R more than left, and therefore no consistent with post ictal or ictal phenomenon either.  Per neurology, most likely another functional event.  He is under psychiatric care, and they have address this in the past.  Patient to be discharge once he starts moving.  Neurology signs off,   Do not recommend any further work up

## 2019-01-01 NOTE — PROGRESS NOTES
[facility] Medicine Daily Progress Note    Date of Service  1/1/2019    Chief Complaint  38 y.o. male admitted 12/30/2018 with right-sided weakness      Interval Problem Update  EEG abnormal    Patient on Depakote    Right-sided hemiparesis persist    A febrile    Consultants/Specialty  Neurology    Code Status  Full    Disposition  Pending    Review of Systems  Review of Systems   Constitutional: Positive for malaise/fatigue.   HENT: Negative.    Eyes: Negative.    Respiratory: Negative.    Cardiovascular: Negative.    Gastrointestinal: Negative.    Genitourinary: Negative.    Musculoskeletal: Negative.    Neurological: Positive for focal weakness.   Psychiatric/Behavioral: Negative.    All other systems reviewed and are negative.       Physical Exam  Temp:  [36.6 °C (97.9 °F)-37.2 °C (99 °F)] 36.6 °C (97.9 °F)  Pulse:  [48-65] 50  Resp:  [14-18] 16  BP: ()/(48-70) 109/70    Physical Exam   Constitutional: He is oriented to person, place, and time. No distress.   Obese   HENT:   Head: Atraumatic.   Mouth/Throat: No oropharyngeal exudate.   Eyes: No scleral icterus.   Neck: No tracheal deviation present. No thyromegaly present.   Cardiovascular: Normal rate and intact distal pulses.  Exam reveals no gallop and no friction rub.    No murmur heard.  Pulmonary/Chest: Effort normal and breath sounds normal. No respiratory distress. He has no rales.   Abdominal: Bowel sounds are normal. He exhibits distension. There is no rebound.   Musculoskeletal: Normal range of motion. He exhibits no edema.   Neurological: He is alert and oriented to person, place, and time.   Right-sided weakness   Skin: Skin is warm and dry. No rash noted. He is not diaphoretic. No erythema. No pallor.       Fluids    Intake/Output Summary (Last 24 hours) at 01/01/19 0942  Last data filed at 01/01/19 0600   Gross per 24 hour   Intake              590 ml   Output             2125 ml   Net            -1535 ml       Laboratory  Recent Labs       12/30/18 2237 01/01/19 0146   WBC  10.1  8.7   RBC  4.41*  4.33*   HEMOGLOBIN  13.5*  13.2*   HEMATOCRIT  41.3*  40.3*   MCV  93.7  93.1   MCH  30.6  30.5   MCHC  32.7*  32.8*   RDW  43.2  42.8   PLATELETCT  302  292   MPV  10.3  9.9     Recent Labs      12/30/18 2237 01/01/19 0146   SODIUM  138  139   POTASSIUM  3.8  3.9   CHLORIDE  102  103   CO2  25  22   GLUCOSE  82  153*   BUN  25*  19   CREATININE  1.04  0.92   CALCIUM  10.0  9.0     Recent Labs      12/30/18 2237   APTT  29.6   INR  0.98         Recent Labs      01/01/19 0146   TRIGLYCERIDE  416*   HDL  32*   LDL  see below       Imaging  MR-BRAIN-W/O   Final Result      1.  No evidence of acute territorial infarct, intracranial hemorrhage or mass lesion.   2.  Unchanged diffuse confluent periventricular white matter signal abnormality throughout both hemispheres consistent with leukoencephalopathy.   3.  Mild diffuse cerebral substance loss.   4.  Redemonstrated enlargement and elongation of the left globe with possible small herniation of the left superolateral aspect which could be consistent with a staphyloma.      DX-CHEST-PORTABLE (1 VIEW)   Final Result         No acute cardiac or pulmonary abnormality is identified.      CT-CTA NECK WITH & W/O-POST PROCESSING   Final Result      CT angiogram of the neck within normal limits.      CT-CTA HEAD WITH & W/O-POST PROCESS   Final Result      CT angiogram of the [place] within normal limits.      CT-CEREBRAL PERFUSION ANALYSIS   Final Result      1.  Cerebral blood flow less than 30% likely representing completed infarct = 0.0 mL.      2.  T Max more than 6 seconds likely representing combination of completed infarct and ischemia = 0.0 mL.      3.  Mismatched volume likely representing ischemic brain/penumbra = 0.0 mL.      4.  Please note that the cerebral perfusion was performed on the limited brain tissue around the basal ganglia region. Infarct/ischemia outside the CT perfusion  sections can be missed in this study.      CT-HEAD W/O   Final Result         NO ACUTE ABNORMALITIES ARE NOTED ON CT SCAN OF THE HEAD.      Decreased attenuation in the cerebral white matter likely indicates microvascular ischemic disease.           Assessment/Plan  * Acute right-sided weakness- (present on admission)   Assessment & Plan    - outside tPA window per Neuro recs  - CVA r/o workup for CT perfusion scan suggestive; MRI brain unremarkable for acute findings  - A1c and lipid panel pending  - PT/OT/SLP eval pending  - cont ASA and Statin in meantime    - Neuro recs appreciated     Seizure (HCC)- (present on admission)   Assessment & Plan    Controlled on current medication regimen  - VPA levels were low low; loaded with IV VPA and Keppra on admission  EEG abnormal      Neurology to follow-up     Anemia- (present on admission)   Assessment & Plan    Normocytic anemia without apparent bleed. Transfuse if needed for hemoglobin less than 7 gm/dL       Class 1 obesity due to excess calories with serious comorbidity and body mass index (BMI) of 31.0 to 31.9 in adult- (present on admission)   Assessment & Plan    Body mass index is 31.97 kg/m².       Bipolar disorder (HCC)- (present on admission)   Assessment & Plan    Controlled on Zoloft     Acquired hypothyroidism- (present on admission)   Assessment & Plan    Continue replacement therapy           VTE prophylaxis: scd

## 2019-01-01 NOTE — PROGRESS NOTES
"2 RN skin check completed. Generalized redness/rash to face/chest/ bilateral arm and legs/back. Per patient, \"the rash is from my [place] soap\". Bilateral heels dry and calloused. Bruising to bilateral legs. All areas blanching at this time.   "

## 2019-01-01 NOTE — THERAPY
"Physical Therapy Evaluation completed.   Bed Mobility:  Supine to Sit: Moderate Assist (x2)  Transfers: Sit to Stand: Moderate Assist (x2)  Gait: Level Of Assist: Unable to Participate     Plan of Care: Will benefit from Physical Therapy 4 times per week  Discharge Recommendations: Equipment: Will Continue to Assess for Equipment Needs. Post-acute therapy: Recommend inpatient transitional care services for continued physical therapy services.      See \"Rehab Therapy-Acute\" Patient Summary Report for complete documentation.       Patient is a 37 YO male that was admitted following complaints of R arm numbness, inability to move R side, and decreased sensation on R side. No evidence of acute infarct, intracranial hemorrhage or mass lesion with imaging. Patient presentation inconsistent during evaluation and demonstrated deficits in motor function, balance, and coordination. Patient demonstrated inability to move R extremities during testing but with some activation during AAROM and assisted stand. Patient consistently reported \"I can't feel anything on the R side\" during sensation testing only when light touch applied to R side. Patient will benefit from continued acute PT services during hospital stay. Currently recommend post acute placement following medical DC.  "

## 2019-01-01 NOTE — PROGRESS NOTES
"· 2 RN skin check complete with OMID Burden.  · Generalized rash to face/chest/bilateral arms/bilateral legs/back/ and feet. Per patient from \"[place] soap.\" Notified MD of rash.   · Bilateral feet dry/flaky/calloused. Bruising to bilateral legs anterior and posterior.   · Sacrum pink, blanching.   · The following interventions in place: Assisting patient in turning often, pillows in use for support/positioning, pillows in use to float heels.    "

## 2019-01-01 NOTE — PROGRESS NOTES
Received report from OMID Blankenship. Assumed patient care at this time. Pt A&Ox4, following commands appropriately, but delayed responses and flat affect. NIHSS completed at bedside. No acute changes per noc RN from her previous findings. IVF running NS at 83 cc/hr and tele monitor in place. SCDs on. POC discussed and all questions and concerns addressed at this time. Fall precautions, hourly rounding, seizure precautions, and Q4 hour neuro checks in place. Bed alarm on, bed locked and in low position, call light and belongings in reach.

## 2019-01-01 NOTE — CARE PLAN
Problem: Communication  Goal: The ability to communicate needs accurately and effectively will improve  Outcome: PROGRESSING AS EXPECTED  Pt. Alert and oriented x 4 at this time and able to call for all needs and communicate concerns with the staff.     Problem: Safety  Goal: Will remain free from falls  Outcome: PROGRESSING AS EXPECTED  Bed alarm on. Call light in reach. Hourly rounding in place.

## 2019-01-01 NOTE — THERAPY
"Occupational Therapy Evaluation completed.   Functional Status: Supine > EOB mod A, sit to stands mod A of 2, lateral seated scooting mod A, LB dressing total A  Plan of Care: Will benefit from Occupational Therapy 4 times per week  Discharge Recommendations:  Equipment: Will Continue to Assess for Equipment Needs. Post-acute therapy: Recommend inpatient transitional care services for continued occupational therapy services. Recommend physiatry consult.    See \"Rehab Therapy-Acute\" Patient Summary Report for complete documentation.    38 y.o. male with h/o bipolar, DD, dyslipidemia, hypothyroid, admitted with R-sided weakness, numbness. MRI negative for acute infarct. CT negative for vascular changes. Pt seen for OT eval. Dep to don B socks. Pt required mod A of 2 for bed mobility and sit to stands. Unable to pivot to chair, but did side-step to R (~6\") with max A. Pt performed lateral seated scoot to R with mod A. On assessment of RUE, pt presents with 1/5 strength throughout; no active motor, but normal tone. Pt reports no sensation to light touch any area of RUE. LUE motor WNL but bradykinetic. 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 Slow to respond, flat affect, appears slightly sedated. Pt resides in [facility] where meals and meds are managed, but no ADL assist. Pt is limited by current presentation of RUE hemiparesis negatively impacting basic ADL and functional transfers. Acute OT to follow. Based on h/o conversion d/o, post-acute DC recs TBD. However, as of this assessment, pt would require placement.         "

## 2019-01-01 NOTE — PROGRESS NOTES
Patient encouraged throughout the shift to get up for meals to chair. Patient also encouraged to use the commode instead of the bedpan. Patient has been refusing all activity today so far. Educated patient on the importance of mobility, patient still declines. Will continue to encourage.

## 2019-01-01 NOTE — DISCHARGE PLANNING
Rehab monitoring for definitive diagnosis. Would appreciate initial PT/OT evals to assist with determination for post acute needs. No Physiatry consult as yet per protocol. Will follow for updates.

## 2019-01-01 NOTE — PROGRESS NOTES
Patient alert and oriented x 4 at this time and following commands. Call light in reach bed alarm on. Hourly rounding in place. Pt. educated about the plan of care and medication regimen. All questions and concerns addressed at this time.

## 2019-01-01 NOTE — PROGRESS NOTES
Patient Diagnosis and Management daily plan per neurology:        1) R hemiparesis: still not moving today.  He says it takes him about 3 or so days   Per examination : no objective dysfunction, and some functional findings to support the diagnosis of no organic problem  Neurology signs off.  Recommend physical therapy, and discharge when patient able to ambulate     Complete neurological note to support plan is below.    Chief Complain:F/U for: Inability to move R side    SUBJECTIVE:Still not moving, no pain         ROS:unchanged      PHYSICAL EXAMINATION:    Constitutional: Lying in bed, sleeping, comfortably at the time of my exam    CARDIOVASCULAR:S1,S2    PULMONARY:CTA      EXTREMITIES:No edema    NEUROLOGICAL:    MENTAL: childish speech    CRANIAL NERVES:2-12 normal    MOTOR:0/5 R, with Romero's +    SENSORY: split tunning fork vibration in the forehead and sternum to the R    DTR:+1, Symmetric    BABINSKI:neg    Movements: No abnormal noticed.    CEREBELLAR:  No   dysmetria in the L  GAIT: Unable.                      Vitals:    12/31/18 2000 01/01/19 0000 01/01/19 0400 01/01/19 0800   BP: (!) 90/48 119/62 113/70 109/70   Pulse: (!) 59 (!) 48 64 (!) 50   Resp: 18 18 16 16   Temp: 36.7 °C (98.1 °F) 36.7 °C (98 °F) 37.2 °C (99 °F) 36.6 °C (97.9 °F)   TempSrc: Temporal Temporal Temporal Temporal   SpO2: 93% 94% 95% 94%   Weight:       Height:                      Imaging: neuroimaging reviewed and directly visualized by me  MR-BRAIN-W/O   Final Result      1.  No evidence of acute territorial infarct, intracranial hemorrhage or mass lesion.   2.  Unchanged diffuse confluent periventricular white matter signal abnormality throughout both hemispheres consistent with leukoencephalopathy.   3.  Mild diffuse cerebral substance loss.   4.  Redemonstrated enlargement and elongation of the left globe with possible small herniation of the left superolateral aspect which could be consistent with a staphyloma.       DX-CHEST-PORTABLE (1 VIEW)   Final Result         No acute cardiac or pulmonary abnormality is identified.      CT-CTA NECK WITH & W/O-POST PROCESSING   Final Result      CT angiogram of the neck within normal limits.      CT-CTA HEAD WITH & W/O-POST PROCESS   Final Result      CT angiogram of the [place] within normal limits.      CT-CEREBRAL PERFUSION ANALYSIS   Final Result      1.  Cerebral blood flow less than 30% likely representing completed infarct = 0.0 mL.      2.  T Max more than 6 seconds likely representing combination of completed infarct and ischemia = 0.0 mL.      3.  Mismatched volume likely representing ischemic brain/penumbra = 0.0 mL.      4.  Please note that the cerebral perfusion was performed on the limited brain tissue around the basal ganglia region. Infarct/ischemia outside the CT perfusion sections can be missed in this study.      CT-HEAD W/O   Final Result         NO ACUTE ABNORMALITIES ARE NOTED ON CT SCAN OF THE HEAD.      Decreased attenuation in the cerebral white matter likely indicates microvascular ischemic disease.            Labs:  Recent Labs      12/30/18 2237 01/01/19 0146   WBC  10.1  8.7   RBC  4.41*  4.33*   HEMOGLOBIN  13.5*  13.2*   HEMATOCRIT  41.3*  40.3*   MCV  93.7  93.1   MCH  30.6  30.5   MCHC  32.7*  32.8*   RDW  43.2  42.8   PLATELETCT  302  292   MPV  10.3  9.9     Recent Labs      12/30/18 2237 01/01/19   0146   SODIUM  138  139   POTASSIUM  3.8  3.9   CHLORIDE  102  103   CO2  25  22   GLUCOSE  82  153*   BUN  25*  19   CREATININE  1.04  0.92   CALCIUM  10.0  9.0     Recent Labs      12/30/18 2237   APTT  29.6   INR  0.98         Recent Labs      12/30/18 2237   TROPONINI  <0.01     Recent Labs      01/01/19 0146   TRIGLYCERIDE  416*   HDL  32*   LDL  see below     Recent Labs      12/30/18 2237 01/01/19 0146   SODIUM  138  139   POTASSIUM  3.8  3.9   CHLORIDE  102  103   CO2  25  22   GLUCOSE  82  153*   BUN  25*  19     Recent  Labs      12/30/18 2237 01/01/19   0146   SODIUM  138  139   POTASSIUM  3.8  3.9   CHLORIDE  102  103   CO2  25  22   BUN  25*  19   CREATININE  1.04  0.92   CALCIUM  10.0  9.0     Recent Labs      12/30/18 2237   APTT  29.6   INR  0.98     Results for orders placed or performed during the hospital encounter of 05/11/18   Echocardiogram Comp W/O cont   Result Value Ref Range    Left Ventrical Ejection Fraction 55                      Sadia Carroll M.D.    Diplomate Neurology, Vascular Neurology and Neurophysiology

## 2019-01-02 PROCEDURE — 770020 HCHG ROOM/CARE - TELE (206)

## 2019-01-02 PROCEDURE — 99232 SBSQ HOSP IP/OBS MODERATE 35: CPT | Performed by: HOSPITALIST

## 2019-01-02 PROCEDURE — 97530 THERAPEUTIC ACTIVITIES: CPT

## 2019-01-02 PROCEDURE — 99255 IP/OBS CONSLTJ NEW/EST HI 80: CPT | Performed by: PHYSICAL MEDICINE & REHABILITATION

## 2019-01-02 PROCEDURE — 700111 HCHG RX REV CODE 636 W/ 250 OVERRIDE (IP): Performed by: HOSPITALIST

## 2019-01-02 PROCEDURE — 700102 HCHG RX REV CODE 250 W/ 637 OVERRIDE(OP): Performed by: HOSPITALIST

## 2019-01-02 PROCEDURE — A9270 NON-COVERED ITEM OR SERVICE: HCPCS | Performed by: HOSPITALIST

## 2019-01-02 RX ADMIN — ACETAMINOPHEN 650 MG: 325 TABLET, FILM COATED ORAL at 23:36

## 2019-01-02 RX ADMIN — SERTRALINE 100 MG: 100 TABLET, FILM COATED ORAL at 05:12

## 2019-01-02 RX ADMIN — LEVOTHYROXINE SODIUM 137 MCG: 137 TABLET ORAL at 05:12

## 2019-01-02 RX ADMIN — BUSPIRONE HYDROCHLORIDE 15 MG: 30 TABLET ORAL at 17:13

## 2019-01-02 RX ADMIN — NYSTATIN: 100000 CREAM TOPICAL at 17:09

## 2019-01-02 RX ADMIN — BUSPIRONE HYDROCHLORIDE 15 MG: 30 TABLET ORAL at 13:25

## 2019-01-02 RX ADMIN — DIVALPROEX SODIUM 500 MG: 500 TABLET, DELAYED RELEASE ORAL at 05:12

## 2019-01-02 RX ADMIN — DIVALPROEX SODIUM 1500 MG: 500 TABLET, DELAYED RELEASE ORAL at 17:03

## 2019-01-02 RX ADMIN — PRAZOSIN HYDROCHLORIDE 1 MG: 1 CAPSULE ORAL at 17:06

## 2019-01-02 RX ADMIN — ATORVASTATIN CALCIUM 80 MG: 80 TABLET, FILM COATED ORAL at 17:03

## 2019-01-02 RX ADMIN — STANDARDIZED SENNA CONCENTRATE AND DOCUSATE SODIUM 2 TABLET: 8.6; 5 TABLET, FILM COATED ORAL at 17:03

## 2019-01-02 RX ADMIN — BUSPIRONE HYDROCHLORIDE 15 MG: 30 TABLET ORAL at 05:12

## 2019-01-02 RX ADMIN — ENOXAPARIN SODIUM 40 MG: 100 INJECTION SUBCUTANEOUS at 05:13

## 2019-01-02 RX ADMIN — ASPIRIN 325 MG: 325 TABLET ORAL at 05:13

## 2019-01-02 RX ADMIN — MONTELUKAST SODIUM 10 MG: 10 TABLET, FILM COATED ORAL at 17:05

## 2019-01-02 RX ADMIN — NYSTATIN: 100000 CREAM TOPICAL at 05:13

## 2019-01-02 ASSESSMENT — ENCOUNTER SYMPTOMS
MUSCULOSKELETAL NEGATIVE: 1
GASTROINTESTINAL NEGATIVE: 1
EYES NEGATIVE: 1
PSYCHIATRIC NEGATIVE: 1
RESPIRATORY NEGATIVE: 1
FOCAL WEAKNESS: 1
CARDIOVASCULAR NEGATIVE: 1

## 2019-01-02 ASSESSMENT — PATIENT HEALTH QUESTIONNAIRE - PHQ9
SUM OF ALL RESPONSES TO PHQ9 QUESTIONS 1 AND 2: 0
2. FEELING DOWN, DEPRESSED, IRRITABLE, OR HOPELESS: NOT AT ALL
1. LITTLE INTEREST OR PLEASURE IN DOING THINGS: NOT AT ALL

## 2019-01-02 ASSESSMENT — GAIT ASSESSMENTS: GAIT LEVEL OF ASSIST: UNABLE TO PARTICIPATE

## 2019-01-02 ASSESSMENT — COGNITIVE AND FUNCTIONAL STATUS - GENERAL
WALKING IN HOSPITAL ROOM: TOTAL
SUGGESTED CMS G CODE MODIFIER MOBILITY: CM
CLIMB 3 TO 5 STEPS WITH RAILING: TOTAL
MOBILITY SCORE: 8
TURNING FROM BACK TO SIDE WHILE IN FLAT BAD: UNABLE
MOVING TO AND FROM BED TO CHAIR: UNABLE
MOVING FROM LYING ON BACK TO SITTING ON SIDE OF FLAT BED: A LOT
STANDING UP FROM CHAIR USING ARMS: A LOT

## 2019-01-02 ASSESSMENT — PAIN SCALES - GENERAL
PAINLEVEL_OUTOF10: 0
PAINLEVEL_OUTOF10: 3
PAINLEVEL_OUTOF10: 0

## 2019-01-02 NOTE — CONSULTS
Physical Medicine and Rehabilitation Consultation         Initial Consult      Date of Consultation: 1/2/2019  Consulting provider: Mark Rangel D.O.  Reason for consultation: assess for acute inpatient rehab appropriateness  Consulting physician Christ Burt    Chief complaint: right weakness    HPI: The patient is a 38 y.o. male with a past medical history of obesity, bipolar disorder type I, hypothyroidism, dyslipidemia, epilepsy, previous traumatic brain injury, presented on 12/30/2018 10:33 PM with right-sided weakness, initially thought to be a stroke, MRI was negative for findings of ischemia or hemorrhage.  He did not receive TPA.  EEG was done and showed question of abnormal bitemporal activity. Valproic acid levels were low on admission, patient was loaded with IV valproic acid and Keppra on admission given an abnormal EEG.       He denies any burning/tingling sensation on the right side.  Prior to 12/30 patient had full strength and sensation on the right side.  He had episodes of this happening approximately 3 times in the past, initiated with mild tingling and the hand and foot, not associated with grand mal seizures.  He reports that usually takes anywhere from 3 days to a month for recovery of strength in his right side.  He complains of numbness on the right side with complete loss of sensation in the upper and lower extremity.  He has complete loss of volitional control of the right side, no movement of the right upper or lower extremity.  He has not been to acute rehab in the past.  He was only on valproic acid in the past.  He denies any trauma which would have propagated the seizure or right-sided weakness.  No change and stress level    He reports not having vision in the left eye, wears corrective glasses.  Vision has not changed since prior to admission  He currently lives in an assisted living facility where they handout his medications and cook meals for him.      He denies any loss  of bowel or bladder control    ROS:  Gen: No fatigue, no confusion, significant weight loss  Eyes: no blurry vision, double vision or pain in eyes  ENT: no changes in hearing, runny nose, nose bleeds, sinus pain  Endo: no episodes of low blood sugar  CV: No CP, palpitations  Lungs: no shortness of breath, changes in secretions, changes in cough, pain with coughing  Abd: No abd pain, nausea, vomiting, pain with eating  : no blood in urine, suprapubic pain  Ext/MSK: No swelling in legs, asymmetric atrophy  Neuro: no changes in strength or sensation today  Skin: no new ulcers/skin breakdown appreciated by patient  Mood: No changes in mood today, increase in depression or anxiety  Heme: no bruising, or bleeding  Allergy: No recent allergies    PMH:  Past Medical History:   Diagnosis Date   • Anxiety     BIPOLAR   • ASTHMA    • Bipolar 1 disorder (HCC)    • Depression    • Fall     passed out 2 wks ago   • Glaucoma    • Glaucoma 1982    both eyes/ blind on left eye   • Hypothyroidism    • Indigestion     once in a while   • Mental disorder     learning disabilities; speech impairment; developmental delays   • Murmur     since birth   • Pneumonia     remote   • Psychiatric problem 2002    PTSD   • S/P thyroidectomy    • Seizure ([facility]) 2010   • Seizure disorder ([facility])    • Unspecified disorder of thyroid        PSH:  Past Surgical History:   Procedure Laterality Date   • EYE SURGERY     • OTHER      Hernia Repair when he was 10 yrs old   • THYROID LOBECTOMY         FHX:  Family History   Problem Relation Age of Onset   • Hypertension Mother    • Heart Disease Mother    • Lung Disease Mother    • Stroke Maternal Grandmother        Medications:  Current Facility-Administered Medications   Medication Dose   • nystatin (MYCOSTATIN) cream     • busPIRone (BUSPAR) tablet 15 mg  15 mg   • atorvastatin (LIPITOR) tablet 80 mg  80 mg   • divalproex (DEPAKOTE) delayed-release tablet 500 mg  500 mg   • levothyroxine (SYNTHROID) tablet  "137 mcg  137 mcg   • prazosin (MINIPRESS) capsule 1 mg  1 mg   • enoxaparin (LOVENOX) inj 40 mg  40 mg   • acetaminophen (TYLENOL) tablet 650 mg  650 mg   • cloNIDine (CATAPRES) tablet 0.1 mg  0.1 mg   • ondansetron (ZOFRAN) syringe/vial injection 4 mg  4 mg   • ondansetron (ZOFRAN ODT) dispertab 4 mg  4 mg   • promethazine (PHENERGAN) tablet 12.5-25 mg  12.5-25 mg   • promethazine (PHENERGAN) suppository 12.5-25 mg  12.5-25 mg   • prochlorperazine (COMPAZINE) injection 5-10 mg  5-10 mg   • senna-docusate (PERICOLACE or SENOKOT S) 8.6-50 MG per tablet 2 Tab  2 Tab    And   • polyethylene glycol/lytes (MIRALAX) PACKET 1 Packet  1 Packet    And   • magnesium hydroxide (MILK OF MAGNESIA) suspension 30 mL  30 mL    And   • bisacodyl (DULCOLAX) suppository 10 mg  10 mg   • aspirin (ASA) tablet 325 mg  325 mg    Or   • aspirin (ASA) chewable tab 324 mg  324 mg    Or   • aspirin (ASA) suppository 300 mg  300 mg   • divalproex (DEPAKOTE) delayed-release tablet 1,500 mg  1,500 mg   • sertraline (ZOLOFT) tablet 100 mg  100 mg   • montelukast (SINGULAIR) tablet 10 mg  10 mg       Allergies:  Allergies   Allergen Reactions   • Abilify Unspecified     \"Feeling tired, like I don't even know whats going on around me\"   • Fish      Pt reports fish causes him to be sick to his stomach         Social Hx:  Previously lived in an assisted living facility  Tobacco: Quit smoking 8 months ago  Alcohol: none  Drugs: +    Prior level of function:   Independent    Current level of function:  The patient was evaluated by acute care Physical Therapy, Occupational Therapy and Speech Language Pathology; currently requiring total A for mobility and ADLs.  He was found to have dysphasia requiring dysphasia diet and thin liquids    Occupational Therapy Evaluation completed.   Functional Status: Supine > EOB mod A, sit to stands mod A of 2, lateral seated scooting mod A, LB dressing total A    Physical Therapy Evaluation completed.   Bed Mobility:  " "Supine to Sit: Moderate Assist (x2)  Transfers: Sit to Stand: Moderate Assist (x2)    Speech Language Therapy Clinical Swallow Evaluation completed.  Functional Status: Pt seen on this date for clinical swallow evaluation in ED no rooms available on floor at this time. Pt A&Ox4 and agreeable to evaluation. Right-sided facial droop, reduced lingual/labial ROM, underbite, and slowed imprecise speech noted during oral motor evaluation. Pt reporting teeth sensitivity through out session and stated it was easier to chew on right side of mouth than left. PO trials of single ice chips, NTL via teaspoon and cup sip, puree, pudding, soft solids, mixed consistencies, and thin liquids via teaspoon, cup sip, straw sip, and consecutive straw sip and no overt s/sx of aspiration noted. Pt with mildly delayed swallow trigger with teaspoons of liquid bolus, however, with straw sip, swallow trigger was timely and appropriate. Upon palpation, laryngeal elevation noted to be weak but complete. Pt unable to self feed due to pain in left arm from IV and difficulty moving right arm so this clinician had to 1:1 feed pt. At this time, would recommend that pt begin a dysphagia III/thin liquid diet due to teeth sensitivity. Would recommend that pt begin a dysphagia III/thin liquid diet with direct supervision/assistance with feeding as necessary. Dysphagia education provided to pt and he verbalized understanding. SLP to follow.     Recommendations - Diet:  Dysphagia III/thin liquid    Physical Exam:  Vitals: Blood pressure (!) 96/45, pulse 61, temperature 36.7 °C (98.1 °F), temperature source Temporal, resp. rate 16, height 1.981 m (6' 6\"), weight (!) 125.5 kg (276 lb 10.8 oz), SpO2 91 %.  Gen: NAD, masked faces, sitting in chair watching a movie  HEENT: NC/AT, disconjugate gaze, moist mucous membranes, no horizontal nystagmus on the right  Cardio: RRR, no mumurs  Pulm: CTAB, with normal respiratory effort  Abd: Soft NTND, active bowel " sounds,   Ext: No peripheral edema.    Mental status: answers questions appropriately follows commands  Speech: fluent, no aphasia or dysarthria    CRANIAL NERVES:  Reports altered sensation to light touch from V1 to V3 on the right    Motor:  Absent myotomes on the right, 4-5 out of 5 myotomes the left    Sensory:   Absent to light touch on the right, intact, 2/2 on the left dermatomes      DTRs: 2+ in bilateral biceps, triceps, brachioradialis, 2+ in bilateral patellar and achilles tendons  No clonus at bilateral ankles  Negative babinski b/l  Negative Gordon b/l       Labs:  Recent Labs      12/30/18 2237 01/01/19   0146   RBC  4.41*  4.33*   HEMOGLOBIN  13.5*  13.2*   HEMATOCRIT  41.3*  40.3*   PLATELETCT  302  292   PROTHROMBTM  13.1   --    APTT  29.6   --    INR  0.98   --      Recent Labs      12/30/18 2237 01/01/19   0146   SODIUM  138  139   POTASSIUM  3.8  3.9   CHLORIDE  102  103   CO2  25  22   GLUCOSE  82  153*   BUN  25*  19   CREATININE  1.04  0.92   CALCIUM  10.0  9.0     No results found for this or any previous visit (from the past 24 hour(s)).    MRI of the brain 12/31  1.  No evidence of acute territorial infarct, intracranial hemorrhage or mass lesion.  2.  Unchanged diffuse confluent periventricular white matter signal abnormality throughout both hemispheres consistent with leukoencephalopathy.  3.  Mild diffuse cerebral substance loss.  4.  Redemonstrated enlargement and elongation of the left globe with possible small herniation of the left superolateral aspect which could be consistent with a staphyloma.    ASSESSMENT:    Right-sided weakness: MRI personally evaluated show no signs of ischemic or hemorrhagic stroke, mass lesions  -Abnormal EEG, showing focal cortical dysfunction of her bilateral temporal area  -Started on valproic acid, and Keppra  -Check level of valproic acid level in am, last level 12/30, 20.5, no active seizure activity reported by staff  -Concern for Santosh's  paralysis  -Continue aspirin  -Since unlikely stroke, consider DC Lipitor    Sz: Was previously on valproic acid  -Underwent loading dose of valproic acid as well as Keppra  -Check VPA level  -Continue Keppra    Bipolar disorder:  - Continue Zoloft 100mg daily  - Check EKG    DVT prophylaxis:  -Patient has minimal movement of the right side, question of increased circumference on the right calf, recommend checking leg circumferences at calf knee and thigh bilaterally twice daily if greater than 2 cm recommend getting Doppler  -Patient has altered movement of the right side, continue Lovenox 40 mg daily    Rehabilitation: Impaired ADLs and mobility  -We will look into level of assist that the assisted living can provide    Discussed with pt, summarized hospitalization and care, options for next step of care    Discussed with team about recommendations       Discussed with patient about recommendations for and plan for rehabilitation as follows. Patient with multiple co-morbidities(including but not limited to bipolar disorder, seizure, right sided weakness); with cognitive deficits and functional deficits in mobility/self-care, and Moderate de-conditioning.     Pre-morbidly, this patient lived in a single level home with One steps to enter, alone and able to care for self. The patient was evaluated by acute care Physical Therapy, Occupational Therapy and Speech Language Pathology; currently requiring moderate assistance for mobility and moderate assistance for ADLs, also with ongoing cognitive, speech and swallowing deficits.     The patient is a(n) Very Good candidate for an acute inpatient rehabilitation program with a coordinated program of care at an intensity and frequency not available at a lower level of care.     Note: if patient continues to progress while waiting for medical clearance, and no longer requires 2 of of 3 therapy services (PT/OT/SLP) at a CGA/Minimal assistance level, patient will no longer  need acute inpatient rehabilitation.    This recommendation is substantiated by the patient's current medical condition with intervention and assessment of medical issues requiring an acute level of care for patient's safety and maximum outcome. A coordinated program of care will be provided by an interdisciplinary team including physical therapy, occupational therapy, speech language pathology, hospitalist, physiatry, rehab nursing and rehab psychology. Rehab goals include improved cognition, speech and swallowing, mobility, self-care management, strength and conditioning/endurance, pain management, bowel and bladder management, mood and affect, and safety with independent home management including caregiver training.     Estimated length of stay is approximately 21 days. Rehab potential: Very Good. Disposition: to pre-morbid independent living setting with supportive care of patient's community resources. We will continue to follow with you in anticipation of discharge to acute inpatient rehabilitation when medically stable to do so at the discretion of his  attending physician. Thank you for allowing us to participate in his care. Please call with any questions regarding this recommendation.    Mark Rangel D.O.  Physical Medicine and Rehabilitation

## 2019-01-02 NOTE — CARE PLAN
Problem: Venous Thromboembolism (VTW)/Deep Vein Thrombosis (DVT) Prevention:  Goal: Patient will participate in Venous Thrombosis (VTE)/Deep Vein Thrombosis (DVT)Prevention Measures    Intervention: Encourage ambulation/mobilization at level directed by Physical Therapy in collaboration with Interdisciplinary Team  Got patient OOB to chair for breakfast. Will try again to mobilize for shower and/or lunch. See ADL's Flowsheet for adherence to plan.

## 2019-01-02 NOTE — PROGRESS NOTES
Patient alert and oriented x 4 at this time and following commands. Requesting new IV site due to discomfort. New IV started, old site removed. Pt. educated about the plan of care and medication regimen. All questions and concerns addressed at this time. Bed alarm on. Call light in reach. Hourly rounding in place.

## 2019-01-02 NOTE — CARE PLAN
Problem: Safety  Goal: Will remain free from injury  Outcome: PROGRESSING AS EXPECTED  Reviewed pt's mobility status, discussed with care team pt's needs, verifying appropriate safety precautions in place, providing pt education, ensuring call light within reach, non slip socks in use, evaluating needs, alarms and monitoring every shift, continuing with current plan of care. Bed alarm on, bed locked and in low position, call light, urinal, and belongings in reach. Patient has skin precautions in place and is encouraged to turn every two hours, providing assistance with weaker side.    Problem: Bowel/Gastric:  Goal: Normal bowel function is maintained or improved  Outcome: PROGRESSING SLOWER THAN EXPECTED  Patient has had two loose BMs this shift.     Problem: Fluid Volume:  Goal: Will maintain balanced intake and output  Outcome: PROGRESSING AS EXPECTED  Patient has adequate PO intake, discussed with physician possibility of discontinuing IV fluids and per Dr. Manny schneider to discontinue fluids. Offer patient more fluids at least every two hours.    Problem: Mobility  Goal: Risk for activity intolerance will decrease  Outcome: PROGRESSING SLOWER THAN EXPECTED  Patient declines getting up to chair throughout shift for this RN. Patient also declines turning at times. Patient encouraged to turn often and educated on the importance for skin breakdown prevention. Patient verbalizes understanding but may need reinforcement.

## 2019-01-02 NOTE — PROGRESS NOTES
[facility] Medicine Daily Progress Note    Date of Service  1/2/2019    Chief Complaint  38 y.o. male admitted 12/30/2018 with right-sided weakness      Interval Problem Update  EEG abnormal      Right-sided hemiparesis persist    Patient is awake and sitting up in chair    A febrile    Consultants/Specialty  Neurology    Code Status  Full    Disposition  Pending    Review of Systems  Review of Systems   Constitutional: Positive for malaise/fatigue.   HENT: Negative.    Eyes: Negative.    Respiratory: Negative.    Cardiovascular: Negative.    Gastrointestinal: Negative.    Genitourinary: Negative.    Musculoskeletal: Negative.    Neurological: Positive for focal weakness.   Psychiatric/Behavioral: Negative.    All other systems reviewed and are negative.       Physical Exam  Temp:  [36.2 °C (97.2 °F)-37.1 °C (98.7 °F)] 36.2 °C (97.2 °F)  Pulse:  [57-68] 62  Resp:  [16-18] 16  BP: (102-121)/(50-78) 110/66    Physical Exam   Constitutional: He is oriented to person, place, and time. No distress.   Obese   HENT:   Head: Atraumatic.   Mouth/Throat: No oropharyngeal exudate.   Eyes: No scleral icterus.   Neck: No tracheal deviation present. No thyromegaly present.   Cardiovascular: Normal rate and intact distal pulses.  Exam reveals no gallop and no friction rub.    No murmur heard.  Pulmonary/Chest: Effort normal and breath sounds normal. No respiratory distress. He has no rales.   Abdominal: Bowel sounds are normal. He exhibits distension. There is no rebound.   Musculoskeletal: Normal range of motion. He exhibits no edema.   Neurological: He is alert and oriented to person, place, and time.   Right-sided weakness   Skin: Skin is warm and dry. No rash noted. He is not diaphoretic. No erythema. No pallor.       Fluids    Intake/Output Summary (Last 24 hours) at 01/02/19 1006  Last data filed at 01/02/19 0000   Gross per 24 hour   Intake             1640 ml   Output             2325 ml   Net             -685 ml        Laboratory  Recent Labs      12/30/18 2237 01/01/19 0146   WBC  10.1  8.7   RBC  4.41*  4.33*   HEMOGLOBIN  13.5*  13.2*   HEMATOCRIT  41.3*  40.3*   MCV  93.7  93.1   MCH  30.6  30.5   MCHC  32.7*  32.8*   RDW  43.2  42.8   PLATELETCT  302  292   MPV  10.3  9.9     Recent Labs      12/30/18 2237 01/01/19 0146   SODIUM  138  139   POTASSIUM  3.8  3.9   CHLORIDE  102  103   CO2  25  22   GLUCOSE  82  153*   BUN  25*  19   CREATININE  1.04  0.92   CALCIUM  10.0  9.0     Recent Labs      12/30/18 2237   APTT  29.6   INR  0.98         Recent Labs      01/01/19 0146   TRIGLYCERIDE  416*   HDL  32*   LDL  see below       Imaging  MR-BRAIN-W/O   Final Result      1.  No evidence of acute territorial infarct, intracranial hemorrhage or mass lesion.   2.  Unchanged diffuse confluent periventricular white matter signal abnormality throughout both hemispheres consistent with leukoencephalopathy.   3.  Mild diffuse cerebral substance loss.   4.  Redemonstrated enlargement and elongation of the left globe with possible small herniation of the left superolateral aspect which could be consistent with a staphyloma.      DX-CHEST-PORTABLE (1 VIEW)   Final Result         No acute cardiac or pulmonary abnormality is identified.      CT-CTA NECK WITH & W/O-POST PROCESSING   Final Result      CT angiogram of the neck within normal limits.      CT-CTA HEAD WITH & W/O-POST PROCESS   Final Result      CT angiogram of the [place] within normal limits.      CT-CEREBRAL PERFUSION ANALYSIS   Final Result      1.  Cerebral blood flow less than 30% likely representing completed infarct = 0.0 mL.      2.  T Max more than 6 seconds likely representing combination of completed infarct and ischemia = 0.0 mL.      3.  Mismatched volume likely representing ischemic brain/penumbra = 0.0 mL.      4.  Please note that the cerebral perfusion was performed on the limited brain tissue around the basal ganglia region.  Infarct/ischemia outside the CT perfusion sections can be missed in this study.      CT-HEAD W/O   Final Result         NO ACUTE ABNORMALITIES ARE NOTED ON CT SCAN OF THE HEAD.      Decreased attenuation in the cerebral white matter likely indicates microvascular ischemic disease.           Assessment/Plan  * Acute right-sided weakness- (present on admission)   Assessment & Plan    - outside tPA window per Neuro recs  - CVA r/o workup for CT perfusion scan suggestive; MRI brain unremarkable for acute findings  - A1c and lipid panel pending  - PT/OT/SLP eval pending  - cont ASA and Statin in meantime    - Neuro recs appreciated     Seizure (HCC)- (present on admission)   Assessment & Plan    Controlled on current medication regimen  - VPA levels were low low; loaded with IV VPA and Keppra on admission  EEG abnormal      Neurology to follow-up     Anemia- (present on admission)   Assessment & Plan    Normocytic anemia without apparent bleed. Transfuse if needed for hemoglobin less than 7 gm/dL       Class 1 obesity due to excess calories with serious comorbidity and body mass index (BMI) of 31.0 to 31.9 in adult- (present on admission)   Assessment & Plan    Body mass index is 31.97 kg/m².       Bipolar disorder (HCC)- (present on admission)   Assessment & Plan    Controlled on Zoloft     Acquired hypothyroidism- (present on admission)   Assessment & Plan    Continue replacement therapy           VTE prophylaxis: scd

## 2019-01-02 NOTE — DISCHARGE PLANNING
Follow up for rehab, per neurology;  Per examination : no objective dysfunction, and some functional findings to support the diagnosis of no organic problem  Neurology signs off. RPG to consult per protocol.

## 2019-01-02 NOTE — RESPIRATORY CARE
COPD EDUCATION by COPD CLINICAL EDUCATOR  1/2/2019 at 6:13 AM by Lupe Milan     Patient reviewed by COPD education team. Patient does not qualify for COPD program.

## 2019-01-02 NOTE — THERAPY
"Physical Therapy Treatment completed.   Bed Mobility:  Supine to Sit: (NT, in chair upon entry)  Transfers: Sit to Stand: Moderate Assist  Gait: Level Of Assist: Unable to Participate       Plan of Care: Will benefit from Physical Therapy 4 times per week  Discharge Recommendations: Equipment: Will Continue to Assess for Equipment Needs. Post-acute therapy: Recommend inpatient transitional care services for continued physical therapy services.      See \"Rehab Therapy-Acute\" Patient Summary Report for complete documentation.       Patient continues to present with inconsistencies in reported impairments and ability in function. Patient reported inability to move R side but gripping therapist's elbow with R arm during stand pivot transfer and no R knee buckling with BLE loading for transfer. Patient will benefit from continued acute PT services but likely with limited benefit currently as impairments appear to be related to behavior. Continue to recommend post acute placement.  "

## 2019-01-02 NOTE — PROGRESS NOTES
Monitor summary: SR 68-82, NC .16, QRS .08, QT .38, with rare couplets per strip from monitor room.

## 2019-01-02 NOTE — PROGRESS NOTES
Monitor summary: SB/SR 52-65, ID .20, QRS .12, QT .36, with rare couplets and HR down to 41 non-sustaining per strip from monitor room.

## 2019-01-03 ENCOUNTER — HOSPITAL ENCOUNTER (INPATIENT)
Facility: REHABILITATION | Age: 39
LOS: 8 days | DRG: 880 | End: 2019-01-11
Attending: PHYSICAL MEDICINE & REHABILITATION | Admitting: PHYSICAL MEDICINE & REHABILITATION
Payer: MEDICAID

## 2019-01-03 VITALS
SYSTOLIC BLOOD PRESSURE: 120 MMHG | BODY MASS INDEX: 33.16 KG/M2 | WEIGHT: 286.6 LBS | TEMPERATURE: 98.1 F | HEIGHT: 78 IN | RESPIRATION RATE: 16 BRPM | DIASTOLIC BLOOD PRESSURE: 72 MMHG | HEART RATE: 60 BPM | OXYGEN SATURATION: 96 %

## 2019-01-03 PROBLEM — E11.69 DIABETES MELLITUS TYPE 2 IN OBESE: Status: ACTIVE | Noted: 2019-01-03

## 2019-01-03 PROBLEM — E66.9 DIABETES MELLITUS TYPE 2 IN OBESE (HCC): Status: ACTIVE | Noted: 2019-01-03

## 2019-01-03 PROCEDURE — 99239 HOSP IP/OBS DSCHRG MGMT >30: CPT | Performed by: HOSPITALIST

## 2019-01-03 PROCEDURE — 99223 1ST HOSP IP/OBS HIGH 75: CPT | Performed by: PHYSICAL MEDICINE & REHABILITATION

## 2019-01-03 PROCEDURE — 700111 HCHG RX REV CODE 636 W/ 250 OVERRIDE (IP): Performed by: HOSPITALIST

## 2019-01-03 PROCEDURE — A9270 NON-COVERED ITEM OR SERVICE: HCPCS | Performed by: HOSPITALIST

## 2019-01-03 PROCEDURE — 700102 HCHG RX REV CODE 250 W/ 637 OVERRIDE(OP): Performed by: HOSPITALIST

## 2019-01-03 PROCEDURE — 94760 N-INVAS EAR/PLS OXIMETRY 1: CPT

## 2019-01-03 PROCEDURE — 700102 HCHG RX REV CODE 250 W/ 637 OVERRIDE(OP): Performed by: PHYSICAL MEDICINE & REHABILITATION

## 2019-01-03 PROCEDURE — 770010 HCHG ROOM/CARE - REHAB SEMI PRIVAT*

## 2019-01-03 PROCEDURE — A9270 NON-COVERED ITEM OR SERVICE: HCPCS | Performed by: PHYSICAL MEDICINE & REHABILITATION

## 2019-01-03 RX ORDER — ASPIRIN 81 MG/1
324 TABLET, CHEWABLE ORAL DAILY
Status: DISCONTINUED | OUTPATIENT
Start: 2019-01-04 | End: 2019-01-03

## 2019-01-03 RX ORDER — ONDANSETRON 2 MG/ML
4 INJECTION INTRAMUSCULAR; INTRAVENOUS 4 TIMES DAILY PRN
Status: DISCONTINUED | OUTPATIENT
Start: 2019-01-03 | End: 2019-01-12 | Stop reason: HOSPADM

## 2019-01-03 RX ORDER — DIVALPROEX SODIUM 500 MG/1
500 TABLET, DELAYED RELEASE ORAL DAILY
Status: CANCELLED | OUTPATIENT
Start: 2019-01-04

## 2019-01-03 RX ORDER — LEVOTHYROXINE SODIUM 112 UG/1
112 TABLET ORAL
Status: DISCONTINUED | OUTPATIENT
Start: 2019-01-04 | End: 2019-01-12 | Stop reason: HOSPADM

## 2019-01-03 RX ORDER — ASPIRIN 325 MG
325 TABLET ORAL DAILY
Status: DISCONTINUED | OUTPATIENT
Start: 2019-01-04 | End: 2019-01-03

## 2019-01-03 RX ORDER — PRAZOSIN HYDROCHLORIDE 1 MG/1
1 CAPSULE ORAL NIGHTLY
Status: DISCONTINUED | OUTPATIENT
Start: 2019-01-03 | End: 2019-01-12 | Stop reason: HOSPADM

## 2019-01-03 RX ORDER — ATORVASTATIN CALCIUM 80 MG/1
80 TABLET, FILM COATED ORAL NIGHTLY
Status: CANCELLED | OUTPATIENT
Start: 2019-01-03

## 2019-01-03 RX ORDER — MONTELUKAST SODIUM 10 MG/1
10 TABLET ORAL NIGHTLY
Status: DISCONTINUED | OUTPATIENT
Start: 2019-01-03 | End: 2019-01-12 | Stop reason: HOSPADM

## 2019-01-03 RX ORDER — NYSTATIN 100000 U/G
CREAM TOPICAL 2 TIMES DAILY
Status: DISCONTINUED | OUTPATIENT
Start: 2019-01-03 | End: 2019-01-12 | Stop reason: HOSPADM

## 2019-01-03 RX ORDER — BUSPIRONE HYDROCHLORIDE 15 MG/1
15 TABLET ORAL 3 TIMES DAILY
Status: DISCONTINUED | OUTPATIENT
Start: 2019-01-03 | End: 2019-01-12 | Stop reason: HOSPADM

## 2019-01-03 RX ORDER — BISACODYL 10 MG
10 SUPPOSITORY, RECTAL RECTAL
Status: DISCONTINUED | OUTPATIENT
Start: 2019-01-03 | End: 2019-01-06

## 2019-01-03 RX ORDER — DIVALPROEX SODIUM 500 MG/1
500 TABLET, DELAYED RELEASE ORAL DAILY
Status: DISCONTINUED | OUTPATIENT
Start: 2019-01-04 | End: 2019-01-12 | Stop reason: HOSPADM

## 2019-01-03 RX ORDER — ACETAMINOPHEN 325 MG/1
650 TABLET ORAL EVERY 6 HOURS PRN
Status: DISCONTINUED | OUTPATIENT
Start: 2019-01-03 | End: 2019-01-03

## 2019-01-03 RX ORDER — ASPIRIN 300 MG/1
300 SUPPOSITORY RECTAL DAILY
Status: DISCONTINUED | OUTPATIENT
Start: 2019-01-04 | End: 2019-01-03

## 2019-01-03 RX ORDER — DIVALPROEX SODIUM 500 MG/1
1500 TABLET, DELAYED RELEASE ORAL EVERY EVENING
Status: CANCELLED | OUTPATIENT
Start: 2019-01-03

## 2019-01-03 RX ORDER — ATORVASTATIN CALCIUM 40 MG/1
80 TABLET, FILM COATED ORAL NIGHTLY
Status: DISCONTINUED | OUTPATIENT
Start: 2019-01-03 | End: 2019-01-12 | Stop reason: HOSPADM

## 2019-01-03 RX ORDER — ONDANSETRON 4 MG/1
4 TABLET, ORALLY DISINTEGRATING ORAL 4 TIMES DAILY PRN
Status: DISCONTINUED | OUTPATIENT
Start: 2019-01-03 | End: 2019-01-12 | Stop reason: HOSPADM

## 2019-01-03 RX ORDER — SERTRALINE HYDROCHLORIDE 100 MG/1
100 TABLET, FILM COATED ORAL DAILY
Status: CANCELLED | OUTPATIENT
Start: 2019-01-04

## 2019-01-03 RX ORDER — POLYVINYL ALCOHOL 14 MG/ML
1 SOLUTION/ DROPS OPHTHALMIC PRN
Status: DISCONTINUED | OUTPATIENT
Start: 2019-01-03 | End: 2019-01-12 | Stop reason: HOSPADM

## 2019-01-03 RX ORDER — AMOXICILLIN 250 MG
2 CAPSULE ORAL 2 TIMES DAILY
Status: DISCONTINUED | OUTPATIENT
Start: 2019-01-03 | End: 2019-01-06

## 2019-01-03 RX ORDER — ALUMINA, MAGNESIA, AND SIMETHICONE 2400; 2400; 240 MG/30ML; MG/30ML; MG/30ML
20 SUSPENSION ORAL
Status: DISCONTINUED | OUTPATIENT
Start: 2019-01-03 | End: 2019-01-12 | Stop reason: HOSPADM

## 2019-01-03 RX ORDER — BUSPIRONE HYDROCHLORIDE 10 MG/1
15 TABLET ORAL 3 TIMES DAILY
Status: CANCELLED | OUTPATIENT
Start: 2019-01-03

## 2019-01-03 RX ORDER — ACETAMINOPHEN 325 MG/1
650 TABLET ORAL EVERY 4 HOURS PRN
Status: DISCONTINUED | OUTPATIENT
Start: 2019-01-03 | End: 2019-01-12 | Stop reason: HOSPADM

## 2019-01-03 RX ORDER — ASPIRIN 300 MG/1
300 SUPPOSITORY RECTAL DAILY
Status: CANCELLED | OUTPATIENT
Start: 2019-01-04

## 2019-01-03 RX ORDER — MONTELUKAST SODIUM 10 MG/1
10 TABLET ORAL NIGHTLY
Status: CANCELLED | OUTPATIENT
Start: 2019-01-03

## 2019-01-03 RX ORDER — HYDRALAZINE HYDROCHLORIDE 25 MG/1
25 TABLET, FILM COATED ORAL EVERY 8 HOURS PRN
Status: DISCONTINUED | OUTPATIENT
Start: 2019-01-03 | End: 2019-01-12 | Stop reason: HOSPADM

## 2019-01-03 RX ORDER — LEVOTHYROXINE SODIUM 137 UG/1
137 TABLET ORAL
Qty: 30 TAB | Status: ON HOLD
Start: 2019-01-04 | End: 2019-01-10

## 2019-01-03 RX ORDER — DIVALPROEX SODIUM 500 MG/1
1500 TABLET, DELAYED RELEASE ORAL EVERY EVENING
Status: DISCONTINUED | OUTPATIENT
Start: 2019-01-03 | End: 2019-01-12 | Stop reason: HOSPADM

## 2019-01-03 RX ORDER — LEVOTHYROXINE SODIUM 0.03 MG/1
25 TABLET ORAL
Status: DISCONTINUED | OUTPATIENT
Start: 2019-01-04 | End: 2019-01-12 | Stop reason: HOSPADM

## 2019-01-03 RX ORDER — ALBUTEROL SULFATE 90 UG/1
2 AEROSOL, METERED RESPIRATORY (INHALATION) EVERY 4 HOURS PRN
Status: DISCONTINUED | OUTPATIENT
Start: 2019-01-03 | End: 2019-01-12 | Stop reason: HOSPADM

## 2019-01-03 RX ORDER — PRAZOSIN HYDROCHLORIDE 1 MG/1
1 CAPSULE ORAL NIGHTLY
Status: CANCELLED | OUTPATIENT
Start: 2019-01-03

## 2019-01-03 RX ORDER — TRAZODONE HYDROCHLORIDE 50 MG/1
50 TABLET ORAL
Status: DISCONTINUED | OUTPATIENT
Start: 2019-01-03 | End: 2019-01-12 | Stop reason: HOSPADM

## 2019-01-03 RX ORDER — NYSTATIN 100000 U/G
CREAM TOPICAL 2 TIMES DAILY
Status: CANCELLED | OUTPATIENT
Start: 2019-01-03

## 2019-01-03 RX ORDER — POLYETHYLENE GLYCOL 3350 17 G/17G
1 POWDER, FOR SOLUTION ORAL
Status: DISCONTINUED | OUTPATIENT
Start: 2019-01-03 | End: 2019-01-06

## 2019-01-03 RX ORDER — LEVOTHYROXINE SODIUM 137 UG/1
137 TABLET ORAL
Status: CANCELLED | OUTPATIENT
Start: 2019-01-04

## 2019-01-03 RX ORDER — ECHINACEA PURPUREA EXTRACT 125 MG
2 TABLET ORAL PRN
Status: DISCONTINUED | OUTPATIENT
Start: 2019-01-03 | End: 2019-01-12 | Stop reason: HOSPADM

## 2019-01-03 RX ORDER — ACETAMINOPHEN 325 MG/1
650 TABLET ORAL EVERY 6 HOURS PRN
Status: CANCELLED | OUTPATIENT
Start: 2019-01-03

## 2019-01-03 RX ORDER — ASPIRIN 81 MG/1
324 TABLET, CHEWABLE ORAL DAILY
Status: CANCELLED | OUTPATIENT
Start: 2019-01-04

## 2019-01-03 RX ORDER — ASPIRIN 325 MG
325 TABLET ORAL DAILY
Status: CANCELLED | OUTPATIENT
Start: 2019-01-04

## 2019-01-03 RX ADMIN — ATORVASTATIN CALCIUM 80 MG: 40 TABLET, FILM COATED ORAL at 20:06

## 2019-01-03 RX ADMIN — SERTRALINE 100 MG: 100 TABLET, FILM COATED ORAL at 06:19

## 2019-01-03 RX ADMIN — ASPIRIN 325 MG: 325 TABLET ORAL at 06:19

## 2019-01-03 RX ADMIN — NYSTATIN: 100000 CREAM TOPICAL at 06:19

## 2019-01-03 RX ADMIN — ACETAMINOPHEN 650 MG: 325 TABLET, FILM COATED ORAL at 09:15

## 2019-01-03 RX ADMIN — DIVALPROEX SODIUM 500 MG: 500 TABLET, DELAYED RELEASE ORAL at 06:19

## 2019-01-03 RX ADMIN — NYSTATIN: 100000 CREAM TOPICAL at 20:06

## 2019-01-03 RX ADMIN — DIVALPROEX SODIUM 1500 MG: 500 TABLET, DELAYED RELEASE ORAL at 20:06

## 2019-01-03 RX ADMIN — BUSPIRONE HYDROCHLORIDE 15 MG: 15 TABLET ORAL at 20:06

## 2019-01-03 RX ADMIN — MONTELUKAST SODIUM 10 MG: 10 TABLET, FILM COATED ORAL at 20:06

## 2019-01-03 RX ADMIN — PRAZOSIN HYDROCHLORIDE 1 MG: 1 CAPSULE ORAL at 20:06

## 2019-01-03 RX ADMIN — LEVOTHYROXINE SODIUM 137 MCG: 137 TABLET ORAL at 06:18

## 2019-01-03 RX ADMIN — ENOXAPARIN SODIUM 40 MG: 100 INJECTION SUBCUTANEOUS at 06:18

## 2019-01-03 RX ADMIN — STANDARDIZED SENNA CONCENTRATE AND DOCUSATE SODIUM 2 TABLET: 8.6; 5 TABLET, FILM COATED ORAL at 20:06

## 2019-01-03 ASSESSMENT — PAIN SCALES - GENERAL
PAINLEVEL_OUTOF10: 3
PAINLEVEL_OUTOF10: 0

## 2019-01-03 ASSESSMENT — PATIENT HEALTH QUESTIONNAIRE - PHQ9
SUM OF ALL RESPONSES TO PHQ9 QUESTIONS 1 AND 2: 0
1. LITTLE INTEREST OR PLEASURE IN DOING THINGS: NOT AT ALL
2. FEELING DOWN, DEPRESSED, IRRITABLE, OR HOPELESS: NOT AT ALL

## 2019-01-03 ASSESSMENT — LIFESTYLE VARIABLES
EVER_SMOKED: YES
ALCOHOL_USE: NO

## 2019-01-03 NOTE — DISCHARGE PLANNING
Anticipated Discharge Disposition: [facility]    Action: Tiger Text received from [facility] stating that pt accepted at [facility] and transfer set for 1330.   Met with pt, pt in agreement, signed COBRA.   Notified Unit RN of COBRA in chart.     Barriers to Discharge: none    Plan: Transfer to [facility] at 1330

## 2019-01-03 NOTE — DISCHARGE PLANNING
Follow up for rehab call out to [facility], [place], prior to hospitalization Norm did require assistance from staff for showering otherwise was independent for mobility and rooming,dressing. The staff at the assisted living was managing medication for Norm. [facility] is able to provide additional care as need to return to their facility. [facility] is able to accomodate a walker and or a wheel chair as needed. Home health may also come to the facility for visits as appropriate. [facility] will have to reassess the return to their facility and the  is Ailin Morales [phone].

## 2019-01-03 NOTE — CARE PLAN
Problem: Venous Thromboembolism (VTW)/Deep Vein Thrombosis (DVT) Prevention:  Goal: Patient will participate in Venous Thrombosis (VTE)/Deep Vein Thrombosis (DVT)Prevention Measures  Outcome: PROGRESSING AS EXPECTED  SCDs in place.    Problem: Pain Management  Goal: Pain level will decrease to patient's comfort goal  Outcome: PROGRESSING AS EXPECTED  PRN meds given.

## 2019-01-03 NOTE — DISCHARGE INSTRUCTIONS
Discharge Instructions    Discharged to other by medical transportation with escort. Discharged via wheelchair, hospital escort: Yes.  Special equipment needed: Not Applicable    Be sure to schedule a follow-up appointment with your primary care doctor or any specialists as instructed.     Discharge Plan:   Diet Plan: Discussed  Activity Level: Discussed  Smoking Cessation Offered: Patient Counseled  Confirmed Follow up Appointment: Patient to Call and Schedule Appointment  Confirmed Symptoms Management: Discussed  Medication Reconciliation Updated: Yes  Influenza Vaccine Indication: Not indicated: Previously immunized this influenza season and > 8 years of age  Influenza Vaccine Given - only chart on this line when given: Influenza Vaccine Given (See MAR)    I understand that a diet low in cholesterol, fat, and sodium is recommended for good health. Unless I have been given specific instructions below for another diet, I accept this instruction as my diet prescription.   Other diet: Dysphagia III    Special Instructions:     Stroke/CVA/TIA/Hemorrhagic Ischemia Discharge Instructions  You have had a stroke. Your risk factors have been identified as follows:  Gender - Men are at a higher risk than women  High blood pressure  Overweight  It is important that you reduce your risk factors to avoid another stroke in the future. Here are some general guidelines to follow:  · Eat healthy - avoid food high in fat.  · Get regular exercise.  · Maintain a healthy weight.  · Avoid smoking.  · Avoid alcohol and illegal drug use.  · Take your medications as directed.  For more information regarding risk factors, refer to pages 17-19 in your Stroke Patient Education Guide. Stroke Education Guide was given to patient.    Warning signs of a stroke include (which can also be found on page 3 of your Stroke Patient Education Guide):  · Sudden numbness of weakness of the face, arm or leg (especially on one side of the body).  · Sudden  confusion, trouble speaking or understanding.  · Sudden trouble seeing in one or both eyes.  · Sudden trouble walking, dizziness, loss of balance or coordination.  · Sudden severe headache with no known cause.  It is very important to get treatment quickly when a stroke occurs. If you experience any of the above warning signs, call [phone] immediately.     Some patients who have had a stroke will be going home on a blood thinner medication called Warfarin (Coumadin).  This medication requires very close monitoring and follow up.  This follow up can be provided by either your Primary Care Physician or by [facility].  The Outpatient Anticoagulation Service is located at the [place] [facility] at [facility] ([facility]).  If you do not know when your follow up appointment is scheduled, call [phone] to verify your appointment time.      · Is patient discharged on Warfarin / Coumadin?   No     Depression / Suicide Risk    As you are discharged from this [facility], it is important to learn how to keep safe from harming yourself.    Recognize the warning signs:  · Abrupt changes in personality, positive or negative- including increase in energy   · Giving away possessions  · Change in eating patterns- significant weight changes-  positive or negative  · Change in sleeping patterns- unable to sleep or sleeping all the time   · Unwillingness or inability to communicate  · Depression  · Unusual sadness, discouragement and loneliness  · Talk of wanting to die  · Neglect of personal appearance   · Rebelliousness- reckless behavior  · Withdrawal from people/activities they love  · Confusion- inability to concentrate     If you or a loved one observes any of these behaviors or has concerns about self-harm, here's what you can do:  · Talk about it- your feelings and reasons for harming yourself  · Remove any means that you might use to  hurt yourself (examples: pills, rope, extension cords, firearm)  · Get professional help from the community (Mental Health, Substance Abuse, psychological counseling)  · Do not be alone:Call your Safe Contact- someone whom you trust who will be there for you.  · Call your local CRISIS HOTLINE 997-5073 or 372-850-9475  · Call your local Children's Mobile Crisis Response Team [place] (714) 747-6684 or www.MediaWheel  · Call the toll free National Suicide Prevention Hotlines   · National Suicide Prevention Lifeline 203-017-CXUW (4865)  · [employer] 800-SUICIDE (999-2172)        Seizure, Adult  When you have a seizure:  · Parts of your body may move.  · How aware or awake (conscious) you are may change.  · You may shake (convulse).  Some people have symptoms right before a seizure happens. These symptoms may include:  · Fear.  · Worry (anxiety).  · Feeling like you are going to throw up (nausea).  · Feeling like the room is spinning (vertigo).  · Feeling like you saw or heard something before (jojo vu).  · Odd tastes or smells.  · Changes in vision, such as seeing flashing lights or spots.  Seizures usually last from 30 seconds to 2 minutes. Usually, they are not harmful unless they last a long time.  Follow these instructions at home:  Medicines  · Take over-the-counter and prescription medicines only as told by your doctor.  · Avoid anything that may keep your medicine from working, such as alcohol.  Activity  · Do not do any activities that would be dangerous if you had another seizure, like driving or swimming. Wait until your doctor approves.  · If you live in the [place] ask your local DMV (department of [employer]) when you can drive.  · Rest.  Teaching others  · Teach friends and family what to do when you have a seizure. They should:  ¨ Lay you on the ground.  ¨ Protect your head and body.  ¨ Loosen any tight clothing around your neck.  ¨ Turn you on your side.  ¨ Stay with you  until you are better.  ¨ Not hold you down.  ¨ Not put anything in your mouth.  ¨ Know whether or not you need emergency care.  General instructions  · Contact your doctor each time you have a seizure.  · Avoid anything that gives you seizures.  · Keep a seizure diary. Write down:  ¨ What you think caused each seizure.  ¨ What you remember about each seizure.  · Keep all follow-up visits as told by your doctor. This is important.  Contact a doctor if:  · You have another seizure.  · You have seizures more often.  · There is any change in what happens during your seizures.  · You continue to have seizures with treatment.  · You have symptoms of being sick or having an infection.  Get help right away if:  · You have a seizure:  ¨ That lasts longer than 5 minutes.  ¨ That is different than seizures you had before.  ¨ That makes it harder to breathe.  ¨ After you hurt your head.  · After a seizure, you cannot speak or use a part of your body.  · After a seizure, you are confused or have a bad headache.  · You have two or more seizures in a row.  · You are having seizures more often.  · You do not wake up right after a seizure.  · You get hurt during a seizure.  In an emergency:  · These symptoms may be an emergency. Do not wait to see if the symptoms will go away. Get medical help right away. Call your local emergency services (911 in the [place] Do not drive yourself to the hospital.  This information is not intended to replace advice given to you by your health care provider. Make sure you discuss any questions you have with your health care provider.  Document Released: 06/05/2009 Document Revised: 08/30/2017 Document Reviewed: 08/30/2017  Elsevier Interactive Patient Education © 2017 [employer]

## 2019-01-03 NOTE — PROGRESS NOTES
Monitor Summary: SB-SR 54-69, LA .18, QRS .10, QT .36 with Bradycardia to 42 per strip from monitor room

## 2019-01-03 NOTE — PROGRESS NOTES
2  rn skin check    Generalized rash noted to bilateral legs, arms, and back.  Bilateral feet dry, flaky, and calloused.   Sacrum pink, blanching.

## 2019-01-03 NOTE — PROGRESS NOTES
Assumed pt care at 0700 and received bedside report.    Pt alert and oriented X 4. Pt denies chest pain, sob, nausea and vomiting, headache, and blurry or double vision. Pt c/o numbness and tingling to R side of body. Pt c/o of headache and medicated per MAR for pain. Pt ambulating 2 x assist pivot to chair. Waffle overlay applied to chair and bed. Pt tolerating diet with 1:1 feed. POC discussed and education provided on administered medications. All questions and concerns addressed. Fall precautions, seizure precautions, hourly rounding and Q4 hour neuro checks in place.

## 2019-01-03 NOTE — PROGRESS NOTES
Assumed care of patient at 1900. Pt is aaox4. Pt c/o headache medicated per MAR. VSS on RA. Pt voiding in urinal. Able to make needs known. POC discussed with patient, all questions answered. Education provided on importance of mobility. Hourly rounding in place.

## 2019-01-03 NOTE — DISCHARGE SUMMARY
Discharge Summary    CHIEF COMPLAINT ON ADMISSION  Chief Complaint   Patient presents with   • Possible Stroke     BIB [facility]. Pt began having weakness around noon with flaccidity to R side around 1700, as well as R side facial droop/weakness       Reason for Admission  Poss stroke     Admission Date  12/30/2018    CODE STATUS  Full Code    HPI & HOSPITAL COURSE  38 y.o. male with prior history of bipolar 1 disorder which is controlled with current medication regimen, hypothyroidism on replacement therapy, and dyslipidemia on statin therapy was in his usual state of health until the day prior to admission.  He apparently woke around 9 in the morning, with numbness on his right side as well as weakness on the same side arm and feet he denies any headache or vision changes, no chest pain or shortness of breath, he does have some nausea, no diarrhea or constipation.  He reports no exacerbating or alleviating factors, and does not feel that he has had much improvement in his sensory or motor symptoms.  MRI of the brain did not show any evidence of acute CVA    EEG was abnormal but it was on the right side of brain.  Dr. haque of neurology reviewed these findings and based on this felt that patient most likely patient had another episode of conversion.  Patient has known history of bipolar disorder and conversion disorder.  His right-sided weakness persisted throughout hospital stay. He received physical therapy and occupational therapy and was referred to rehab.  He was given a diabetic diet with sliding scale insulin for history of diabetes.       Therefore, he is discharged in good and stable condition to an inpatient rehabilitation hospital.    The patient met 2-midnight criteria for an inpatient stay at the time of discharge.    Discharge Date  1/3    FOLLOW UP ITEMS POST DISCHARGE      DISCHARGE DIAGNOSES  Principal Problem:    Acute right-sided weakness POA: Yes  Active Problems:    Conversion disorder POA: Yes     Acquired hypothyroidism POA: Yes    Bipolar disorder (HCC) POA: Yes    Class 1 obesity due to excess calories with serious comorbidity and body mass index (BMI) of 31.0 to 31.9 in adult POA: Yes    Anemia POA: Yes    Seizure (HCC) POA: Yes    Diabetes mellitus type 2 in obese (HCC) POA: Yes  Resolved Problems:    * No resolved hospital problems. *      FOLLOW UP  No future appointments.  No follow-up provider specified.    MEDICATIONS ON DISCHARGE     Medication List      START taking these medications      Instructions   aspirin EC 81 MG Tbec  Commonly known as:  ECOTRIN   Take 1 Tab by mouth every day.  Dose:  81 mg     enoxaparin 40 MG/0.4ML Soln inj  Start taking on:  1/4/2019  Commonly known as:  LOVENOX   Inject 40 mg as instructed every day.  Dose:  40 mg        CHANGE how you take these medications      Instructions   levothyroxine 137 MCG Tabs  Start taking on:  1/4/2019  What changed:  · medication strength  · how much to take  · Another medication with the same name was removed. Continue taking this medication, and follow the directions you see here.  Commonly known as:  SYNTHROID   Take 1 Tab by mouth Every morning on an empty stomach.  Dose:  137 mcg        CONTINUE taking these medications      Instructions   atorvastatin 80 MG tablet  Commonly known as:  LIPITOR   Take 80 mg by mouth every evening.  Dose:  80 mg     busPIRone 15 MG tablet  Commonly known as:  BUSPAR   Take 15 mg by mouth 3 times a day.  Dose:  15 mg     [employer] D3 2000 UNIT Caps  Generic drug:  Cholecalciferol   Take 4,000 Units by mouth every evening.  Dose:  4000 Units     divalproex 500 MG Tbec  Commonly known as:  DEPAKOTE   Take 500-1,500 mg by mouth 2 Times a Day. 500 mg AM 1500 mg PM  Dose:  500-1500 mg     glimepiride 4 MG Tabs  Commonly known as:  AMARYL   Take 4 mg by mouth every morning.  Dose:  4 mg     metformin 1000 MG tablet  Commonly known as:  GLUCOPHAGE   Take 1 Tab by mouth 2 times a day, with meals.  Dose:  1000 mg    "  montelukast 10 MG Tabs  Commonly known as:  SINGULAIR   Take 10 mg by mouth every day.  Dose:  10 mg     prazosin 1 MG Caps  Commonly known as:  MINIPRESS   Take 1 mg by mouth every evening.  Dose:  1 mg     raNITidine 150 MG Tabs  Commonly known as:  ZANTAC   Take 150 mg by mouth 2 times a day.  Dose:  150 mg     sertraline 100 MG Tabs  Commonly known as:  ZOLOFT   Take 100 mg by mouth every morning.  Dose:  100 mg        STOP taking these medications    cephALEXin 500 MG Caps  Commonly known as:  KEFLEX     FIORINAL -40 MG Caps  Generic drug:  ASPIRIN-CAFFEINE-BUTALBITAL     nystatin 351656 UNIT/GM Oint  Commonly known as:  MYCOSTATIN     triamcinolone acetonide 0.1 % Crea  Commonly known as:  KENALOG            Allergies  Allergies   Allergen Reactions   • Abilify Unspecified     \"Feeling tired, like I don't even know whats going on around me\"   • Fish      Pt reports fish causes him to be sick to his stomach         DIET  Orders Placed This Encounter   Procedures   • Diet Order Regular     Standing Status:   Standing     Number of Occurrences:   1     Order Specific Question:   Diet:     Answer:   Regular [1]     Order Specific Question:   Texture/Fiber modifications:     Answer:   Dysphagia 3(Mechanical Soft)specify fluid consistency(question 6) [3]     Order Specific Question:   Consistency/Fluid modifications:     Answer:   Thin Liquids [3]     Order Specific Question:   Miscellaneous modifications:     Answer:   SLP - Deliver to Nursing Station [22]     Comments:   needs assistance with feeding     Order Specific Question:   Miscellaneous modifications:     Answer:   SLP - 1:1 Supervision by Nursing [21]   • Discontinue Diet Tray     Standing Status:   Standing     Number of Occurrences:   1       ACTIVITY  As tolerated.  Weight bearing as tolerated    CONSULTATIONS  Dr haque neurology    PROCEDURES  Patient Information     Patient Name  Norm Prabhakar ([id number]) Sex  Male   1980   Room Bed " Code Status Current Location   S193 01 FULL 01   Reprint Order Requisition     MR-BRAIN-W/O (Order #[id number]) on 12/31/18   Last Resulted Time   Mon Dec 31, 2018  8:45 AM   Images     Show images for MR-BRAIN-W/O   Imaging Result Status     Status: Final result (Exam End: 12/31/2018  8:38 AM)   Imaging Previous Results     Open Hard Copy Result Report (Order #[id number] - MR-BRAIN-W/O)   Narrative       12/31/2018 7:47 AM    HISTORY/REASON FOR EXAM:  Right-sided numbness. History of fall and seizures. Prior head injury.    TECHNIQUE/EXAM DESCRIPTION AND NUMBER OF VIEWS:  MRI of the brain without contrast.    The study was performed on a [employer]. 1.5 Polina MRI scanner. Spoiled-GRASS sagittal, thin-section T2 fast spin-echo axial, T1 coronal, and FLAIR coronal images were obtained of the whole brain.    COMPARISON: 5/11/2018.    FINDINGS:  The calvariae are normal. There is redemonstrated enlargement and elongation of the left globe with possible small uveal herniation of the left superolateral aspect which could be consistent with a staphyloma. There are no extra-axial fluid   collections. There is a stable pattern of mild cerebral atrophy manifest as prominence of sulcal markings over the convexities and vertex along with mild ventriculomegaly.    There is unchanged diffuse confluent periventricular T2 and FLAIR signal hyperintensity which is unchanged from the prior study consistent with leukoencephalopathy. There is no evidence of abnormal restricted diffusion. There is no mass effect or midline   shift. There are no hemorrhagic lesions. The brainstem and posterior fossa structures are unremarkable.    Vascular flow voids in the carotid and vertebrobasilar arteries, [place], and dural venous sinuses are intact. The visualized paranasal sinuses and mastoid air cells appear clear.   Impression       1.  No evidence of acute territorial infarct, intracranial hemorrhage or mass lesion.  2.  Unchanged diffuse  confluent periventricular white matter signal abnormality throughout both hemispheres consistent with leukoencephalopathy.  3.  Mild diffuse cerebral substance loss.  4.  Redemonstrated enlargement and elongation of the left globe with possible small herniation of the left superolateral aspect which could be consistent with a staphyloma.   Reading Provider Reading Date   Ehsan Dean M.D. Dec 31, 2018     ---------------------------------------------------  CURRENT ANTIEPILEPTIC REGIMEN: Depakote     DURATION:         TECHNIQUE: A 30-channel video electroencephalogram (VEEG) was performed in accordance with the international 10-20 system. This digital study was reviewed in bipolar and referential montages. The recording examined the patient during wakeful, drowsy and sleep states.            DESCRIPTION OF THE RECORD:  During the awake state, background shows symmetrical 9-10 Hz alpha activity posteriorly with amplitude of 70 mV.  There was reactivity with eye opening/closure.  Normal anterior-posterior gradient was noted with faster beta frequencies seen anteriorly.  During drowsiness, high-amplitude delta frequencies were seen. There are prolonged TIRDA over both temporal, slightly more over right, but also in the left     During the sleep state, background shows diffuse high-amplitude 4-5 Hz delta activity.  Symmetrical high-amplitude sleep spindles and vertex sharp activities were seen in the central leads.     ACTIVATION PROCEDURES:   Hyperventilation was  performed by the patient. The technician performing the test noted good effort. This technique produced electroencephalographic changes in keeping with the expected bilaterally synchronous, frontally predominant, high amplitude slow waves build up.      Intermittent Photic stimulation was performed in a stepwise fashion from 1 to 30 Hz and elicited a normal response (photic driving), most noticeable in the posterior leads.        ICTAL AND/OR INTERICTAL  FINDINGS:   No focal or generalized epileptiform activity was noted.There are prolonged TIRDA over both temporal, slightly more over right, but also in the left No seizures were recorded during the study.      EVENT(S):  NONE     EKG: sampling review of EKG recording demonstrated regular rhythm        INTERPRETATION:            ________________________________________________________________________     This is abnormal routine video EEG recording in the awake and drowsy/sleep state(s).     This scalp EEG denotes                  1. Focal cortical dysfunction over bi-temporal --this patten would increase the risk of temporal seizure - advise clinical correlation regarding management      If clinical suspicion of active seizure remains high.  Prolonged EEG   monitoring may be of help.      LABORATORY  Lab Results   Component Value Date    SODIUM 139 01/01/2019    POTASSIUM 3.9 01/01/2019    CHLORIDE 103 01/01/2019    CO2 22 01/01/2019    GLUCOSE 153 (H) 01/01/2019    BUN 19 01/01/2019    CREATININE 0.92 01/01/2019        Lab Results   Component Value Date    WBC 8.7 01/01/2019    HEMOGLOBIN 13.2 (L) 01/01/2019    HEMATOCRIT 40.3 (L) 01/01/2019    PLATELETCT 292 01/01/2019        Total time of the discharge process exceeds 44 minutes.

## 2019-01-03 NOTE — DISCHARGE PLANNING
PAS completed submitted to insurance for consideration for IRF level of care. Attempt to call mother unable to leave voice mail.

## 2019-01-03 NOTE — PREADMISSION SCREENING NOTE
"  Pre-Admission Screening Form    Patient Information:   Name: Norm Prabhakar     MRN: [id number]       : 1980      Age: 38 y.o.   Gender: male      Race: White [7]       Marital Status: Single [1]  Family Contact: Leslie Torres        Relationship: Mother [8]  Home Phone:            Cell Phone: [phone]  Advanced Directives: None  Code Status:  FULL  Current Attending Provider: Oliver Bryant M.D.  Referring Physician: Dr. Burt      Physiatrist Consult: Dr. Rangel        Referral Date: 2019  Primary Payor Source:  [facility] MEDICAID  Secondary Payor Source:      Medical Information:   Date of Admission to Acute Care [place]  Room Number: S193/01  Rehabilitation Diagnosis: 03.9 Other Neurologic  Immunization History   Administered Date(s) Administered   • Influenza (IM) Preservative Free 10/31/2013   • Influenza TIV (IM) 10/02/2012, 2013   • Influenza Vaccine H1N1 2011   • Influenza Vaccine Pediatric Split 10/02/2012, 2013, 2014, 09/10/2014, 2014   • Influenza Vaccine Quad Inj (Pf) 10/16/2014, 2014, 2015, 10/01/2016, 10/27/2017, 2017, 2018, 2018   • Influenza Vaccine Quad Inj (Preserved) 2016   • Pneumococcal Vaccine (UF)Historical Data 2011   • Pneumococcal polysaccharide vaccine (PPSV-23) 2015   • Tdap Vaccine 2015     Allergies   Allergen Reactions   • Abilify Unspecified     \"Feeling tired, like I don't even know whats going on around me\"   • Fish      Pt reports fish causes him to be sick to his stomach       Past Medical History:   Diagnosis Date   • Anxiety     BIPOLAR   • ASTHMA    • Bipolar 1 disorder (HCC)    • Depression    • Fall     passed out 2 wks ago   • Glaucoma    • Glaucoma 1982    both eyes/ blind on left eye   • Hypothyroidism    • Indigestion     once in a while   • Mental disorder     learning disabilities; speech impairment; developmental delays   • Murmur     since birth   • " Pneumonia     remote   • Psychiatric problem 2002    PTSD   • S/P thyroidectomy    • Seizure (HCC) 2010   • Seizure disorder (HCC)    • Unspecified disorder of thyroid      Past Surgical History:   Procedure Laterality Date   • EYE SURGERY     • OTHER      Hernia Repair when he was 10 yrs old   • THYROID LOBECTOMY         History Leading to Admission, Conditions that Caused the Need for Rehab (CMS):        Christ Burt M.D. [facility]  H&P Date of Service: 12/31/2018  2:19 AM           Hospital Medicine History & Physical Note     Date of Service  12/31/2018     Primary Care Physician  ANIKET Chung     Consultants  None     Code Status  Full code      Chief Complaint  Numbness and weakness right side     History of Presenting Illness  38 y.o. male with prior history of bipolar 1 disorder which is controlled with current medication regimen, hypothyroidism on replacement therapy, and dyslipidemia on statin therapy was in his usual state of health until the day prior to admission.  He apparently woke around 9 in the morning, with numbness on his right side as well as weakness on the same side arm and feet he denies any headache or vision changes, no chest pain or shortness of breath, he does have some nausea, no diarrhea or constipation.  He reports no exacerbating or alleviating factors, and does not feel that he has had much improvement in his sensory or motor symptoms.          Assessment/Plan:  I anticipate this patient is appropriate for observation status at this time.         Acute right-sided weakness- (present on admission)   Assessment & Plan     Outside of window for TPA       Anemia- (present on admission)   Assessment & Plan     Normocytic anemia without apparent bleed. Transfuse if needed for hemoglobin less than 7 gm/dL         Class 1 obesity due to excess calories with serious comorbidity and body mass index (BMI) of 31.0 to 31.9 in adult- (present on admission)    Assessment & Plan     Body mass index is 31.97 kg/m².         Bipolar disorder (HCC)- (present on admission)   Assessment & Plan     Controlled on current medication regimn      Acquired hypothyroidism- (present on admission)   Assessment & Plan     Continue replacement therapy             VTE prophylaxis: SCD, lovenox    Sadia Carroll M.D. Physician Signed Neurology  Consults Date of Service: 12/31/2018  6:03 PM   Consult Orders:   IP Consult to Neurology [[id number]] ordered by Christ Burt M.D. at 12/31/18 0216         []Hide copied text              Admission Date: 12/30/2018     CC: R side weakness     Chief complaint:  Mr. Norm Prabhakar is a 38 y.o. Man who presented with right upper extremity, R lower extremity, and right facial numbness and weakness that started around 9pm on 12/30/2018 with no other associated symptoms.   He has a past medical history of seizure disorder on depakote 500mg am, 1500mg PM, PTSD, developmental delay, bipolar 1 disorder, asthma, hypothyroidism, DM, glaucoma who lives at an assisted living facility was brought in by EMS following onset of his right sided weakness and numbness.          ASSESSMENT & PLAN:     Final diagnosis and assessment:     Recurrent hemiparesis: Almost every 6 months since 2015, and being diagnosed as conversive disorder in the past.  Now, of same characteristics, as previous presentations.  Nothing objective in the examination as no increased reflexes, no babinsky   MRI brain again negative, and EEG with mild temporal dysfunction, R more than left, and therefore no consistent with post ictal or ictal phenomenon either.  Per neurology, most likely another functional event.  He is under psychiatric care, and they have address this in the past.  Patient to be discharge once he starts moving.  Neurology signs off,   Do not recommend any further work up    Mark Rangel D.O. Physician Signed Physical Medicine & Rehab  Consults Date of  Service: 1/2/2019  2:08 PM   Consult Orders:   IP CONSULT FOR PHYSIATRY [[id number]] ordered by Christ Burt M.D. at 01/02/19 0927      Expand All Collapse All    []Hide copied text                                          Physical Medicine and Rehabilitation Consultation                                                                        Initial Consult        Date of Consultation: 1/2/2019  Consulting provider: Mark Rangel D.O.  Reason for consultation: assess for acute inpatient rehab appropriateness  Consulting physician Christ Burt     Chief complaint: right weakness     HPI: The patient is a 38 y.o. male with a past medical history of obesity, bipolar disorder type I, hypothyroidism, dyslipidemia, epilepsy, previous traumatic brain injury, presented on 12/30/2018 10:33 PM with right-sided weakness, initially thought to be a stroke, MRI was negative for findings of ischemia or hemorrhage.  He did not receive TPA.  EEG was done and showed question of abnormal bitemporal activity. Valproic acid levels were low on admission, patient was loaded with IV valproic acid and Keppra on admission given an abnormal EEG.       He denies any burning/tingling sensation on the right side.  Prior to 12/30 patient had full strength and sensation on the right side.  He had episodes of this happening approximately 3 times in the past, initiated with mild tingling and the hand and foot, not associated with grand mal seizures.  He reports that usually takes anywhere from 3 days to a month for recovery of strength in his right side.  He complains of numbness on the right side with complete loss of sensation in the upper and lower extremity.  He has complete loss of volitional control of the right side, no movement of the right upper or lower extremity.  He has not been to acute rehab in the past.  He was only on valproic acid in the past.  He denies any trauma which would have propagated the seizure or right-sided  "weakness.  No change and stress level     He reports not having vision in the left eye, wears corrective glasses.  Vision has not changed since prior to admission  He currently lives in an assisted living facility where they handout his medications and cook meals for him.       He denies any loss of bowel or bladder control     ROS:  Gen: No fatigue, no confusion, significant weight loss  Eyes: no blurry vision, double vision or pain in eyes  ENT: no changes in hearing, runny nose, nose bleeds, sinus pain  Endo: no episodes of low blood sugar  CV: No CP, palpitations  Lungs: no shortness of breath, changes in secretions, changes in cough, pain with coughing  Abd: No abd pain, nausea, vomiting, pain with eating  : no blood in urine, suprapubic pain  Ext/MSK: No swelling in legs, asymmetric atrophy  Neuro: no changes in strength or sensation today  Skin: no new ulcers/skin breakdown appreciated by patient  Mood: No changes in mood today, increase in depression or anxiety  Heme: no bruising, or bleeding  Allergy: No recent allergies     Allergies   Allergen Reactions   • Abilify Unspecified       \"Feeling tired, like I don't even know whats going on around me\"   • Fish         Pt reports fish causes him to be sick to his stomach            Social Hx:  Previously lived in an assisted living facility  Tobacco: Quit smoking 8 months ago  Alcohol: none  Drugs: +     Prior level of function:   Independent     Current level of function:  The patient was evaluated by acute care Physical Therapy, Occupational Therapy and Speech Language Pathology; currently requiring total A for mobility and ADLs.  He was found to have dysphasia requiring dysphasia diet and thin liquids     Occupational Therapy Evaluation completed.   Functional Status: Supine > EOB mod A, sit to stands mod A of 2, lateral seated scooting mod A, LB dressing total A     Physical Therapy Evaluation completed.   Bed Mobility:  Supine to Sit: Moderate Assist " "(x2)  Transfers: Sit to Stand: Moderate Assist (x2)     Speech Language Therapy Clinical Swallow Evaluation completed.  Functional Status: Pt seen on this date for clinical swallow evaluation in ED no rooms available on floor at this time. Pt A&Ox4 and agreeable to evaluation. Right-sided facial droop, reduced lingual/labial ROM, underbite, and slowed imprecise speech noted during oral motor evaluation. Pt reporting teeth sensitivity through out session and stated it was easier to chew on right side of mouth than left. PO trials of single ice chips, NTL via teaspoon and cup sip, puree, pudding, soft solids, mixed consistencies, and thin liquids via teaspoon, cup sip, straw sip, and consecutive straw sip and no overt s/sx of aspiration noted. Pt with mildly delayed swallow trigger with teaspoons of liquid bolus, however, with straw sip, swallow trigger was timely and appropriate. Upon palpation, laryngeal elevation noted to be weak but complete. Pt unable to self feed due to pain in left arm from IV and difficulty moving right arm so this clinician had to 1:1 feed pt. At this time, would recommend that pt begin a dysphagia III/thin liquid diet due to teeth sensitivity. Would recommend that pt begin a dysphagia III/thin liquid diet with direct supervision/assistance with feeding as necessary. Dysphagia education provided to pt and he verbalized understanding. SLP to follow.     Recommendations - Diet:  Dysphagia III/thin liquid     Physical Exam:  Vitals: Blood pressure (!) 96/45, pulse 61, temperature 36.7 °C (98.1 °F), temperature source Temporal, resp. rate 16, height 1.981 m (6' 6\"), weight (!) 125.5 kg (276 lb 10.8 oz), SpO2 91 %.  Gen: NAD, masked faces, sitting in chair watching a movie  HEENT: NC/AT, disconjugate gaze, moist mucous membranes, no horizontal nystagmus on the right  Cardio: RRR, no mumurs  Pulm: CTAB, with normal respiratory effort  Abd: Soft NTND, active bowel sounds,   Ext: No peripheral " edema.     Mental status: answers questions appropriately follows commands  Speech: fluent, no aphasia or dysarthria     CRANIAL NERVES:  Reports altered sensation to light touch from V1 to V3 on the right     Motor:  Absent myotomes on the right, 4-5 out of 5 myotomes the left     Sensory:   Absent to light touch on the right, intact, 2/2 on the left dermatomes        DTRs: 2+ in bilateral biceps, triceps, brachioradialis, 2+ in bilateral patellar and achilles tendons  No clonus at bilateral ankles  Negative babinski b/l  Negative Gordon b/l   Recent Results   No results found for this or any previous visit (from the past 24 hour(s)).        MRI of the brain 12/31  1.  No evidence of acute territorial infarct, intracranial hemorrhage or mass lesion.  2.  Unchanged diffuse confluent periventricular white matter signal abnormality throughout both hemispheres consistent with leukoencephalopathy.  3.  Mild diffuse cerebral substance loss.  4.  Redemonstrated enlargement and elongation of the left globe with possible small herniation of the left superolateral aspect which could be consistent with a staphyloma.     ASSESSMENT:     Right-sided weakness: MRI personally evaluated show no signs of ischemic or hemorrhagic stroke, mass lesions  -Abnormal EEG, showing focal cortical dysfunction of her bilateral temporal area  -Started on valproic acid, and Keppra  -Check level of valproic acid level in am, last level 12/30, 20.5, no active seizure activity reported by staff  -Concern for Santosh's paralysis  -Continue aspirin  -Since unlikely stroke, consider DC Lipitor     Sz: Was previously on valproic acid  -Underwent loading dose of valproic acid as well as Keppra  -Check VPA level  -Continue Keppra     Bipolar disorder:  - Continue Zoloft 100mg daily  - Check EKG     DVT prophylaxis:  -Patient has minimal movement of the right side, question of increased circumference on the right calf, recommend checking leg circumferences  at calf knee and thigh bilaterally twice daily if greater than 2 cm recommend getting Doppler  -Patient has altered movement of the right side, continue Lovenox 40 mg daily     Rehabilitation: Impaired ADLs and mobility  -We will look into level of assist that the assisted living can provide     Discussed with pt, summarized hospitalization and care, options for next step of care     Discussed with team about recommendations         Discussed with patient about recommendations for and plan for rehabilitation as follows. Patient with multiple co-morbidities(including but not limited to bipolar disorder, seizure, right sided weakness); with cognitive deficits and functional deficits in mobility/self-care, and Moderate de-conditioning.      Pre-morbidly, this patient lived in a single level home with One steps to enter, alone and able to care for self. The patient was evaluated by acute care Physical Therapy, Occupational Therapy and Speech Language Pathology; currently requiring moderate assistance for mobility and moderate assistance for ADLs, also with ongoing cognitive, speech and swallowing deficits.      The patient is a(n) Very Good candidate for an acute inpatient rehabilitation program with a coordinated program of care at an intensity and frequency not available at a lower level of care.      Note: if patient continues to progress while waiting for medical clearance, and no longer requires 2 of of 3 therapy services (PT/OT/SLP) at a CGA/Minimal assistance level, patient will no longer need acute inpatient rehabilitation.     This recommendation is substantiated by the patient's current medical condition with intervention and assessment of medical issues requiring an acute level of care for patient's safety and maximum outcome. A coordinated program of care will be provided by an interdisciplinary team including physical therapy, occupational therapy, speech language pathology, hospitalist, physiatry, rehab  nursing and rehab psychology. Rehab goals include improved cognition, speech and swallowing, mobility, self-care management, strength and conditioning/endurance, pain management, bowel and bladder management, mood and affect, and safety with independent home management including caregiver training.      Estimated length of stay is approximately 21 days. Rehab potential: Very Good. Disposition: to pre-morbid independent living setting with supportive care of patient's community resources. We will continue to follow with you in anticipation of discharge to acute inpatient rehabilitation when medically stable to do so at the discretion of his  attending physician. Thank you for allowing us to participate in his care. Please call with any questions regarding this recommendation.     Mark Rangel D.O.  Physical Medicine and Rehabilitation      Co-morbidities: as listed above/ below  Potential Risk - Complications: Contractures, Deep Vein Thrombosis, Dysphagia, Incontinence, Malnutrition, Pain, Paralysis, Perceptual Impairment and Pressure Ulcer  Level of Risk: High    Ongoing Medical Management Needed (Medical/Nursing Needs):   Patient Active Problem List    Diagnosis Date Noted   • Intractable abdominal pain 07/28/2018     Priority: High   • Acute right-sided weakness 04/17/2014     Priority: High   • Right sided weakness 08/28/2013     Priority: High   • Conversion disorder 03/15/2013     Priority: High   • Urticaria of entire body 08/19/2018     Priority: Medium   • Leukocytosis, stress-, and streoid-related 08/19/2018     Priority: Medium   • Class 1 obesity due to excess calories with serious comorbidity and body mass index (BMI) of 31.0 to 31.9 in adult 07/28/2018     Priority: Low   • Depression 03/06/2016     Priority: Low   • Bipolar disorder (HCC) 12/02/2014     Priority: Low   • Psychiatric problem      Priority: Low   • Glaucoma 08/15/2011     Priority: Low   • Acquired hypothyroidism 08/15/2011      "Priority: Low   • Depression 08/15/2011     Priority: Low   • HLD (hyperlipidemia) 08/19/2018   • Pre-ulcerative corn or callous 02/04/2018   • Type 2 diabetes mellitus without complication, without long-term current use of insulin ([facility]) 02/02/2018   • Right hemiparesis ([facility]) 09/12/2017   • Hyperglycemia 09/12/2017   • PTSD (post-traumatic stress disorder) 03/06/2016   • Pansinusitis 05/27/2015   • Change in mental status 05/26/2015   • Seizure ([facility]) 12/01/2014   • Weakness 12/01/2014   • Patient non adherence 10/16/2014   • Headache 08/06/2014   • Syncope 07/27/2014   • Anemia 05/03/2013   • Thyroid mass of unclear etiology 12/06/2012   • Paralysis on one side of body ([facility]) 08/05/2012   • Asthma 08/15/2011   • Bradycardia 08/15/2011   • Hemiparesis ([facility]) 08/15/2011     Negin Clark RLyricNLyric Registered Nurse Signed   Progress Notes Date of Service: 1/3/2019  9:20 AM           Assumed pt care at 0700 and received bedside report.     Pt alert and oriented X 4. Pt denies chest pain, sob, nausea and vomiting, headache, and blurry or double vision. Pt c/o numbness and tingling to R side of body. Pt c/o of headache and medicated per MAR for pain. Pt ambulating 2 x assist pivot to chair. Waffle overlay applied to chair and bed. Pt tolerating diet with 1:1 feed. POC discussed and education provided on administered medications. All questions and concerns addressed. Fall precautions, seizure precautions, hourly rounding and Q4 hour neuro checks in place.              Current Vital Signs:   Temperature: 36.7 °C (98.1 °F) Pulse: 60 Respiration: 16 Blood Pressure: 120/72  Weight: (!) 130 kg (286 lb 9.6 oz) Height: 198.1 cm (6' 6\")  Pulse Oximetry: 96 % O2 (LPM): 0      Completed Laboratory Reports:  Recent Labs      01/01/19   0146   WBC  8.7   HEMOGLOBIN  13.2*   HEMATOCRIT  40.3*   PLATELETCT  292   SODIUM  139   POTASSIUM  3.9   BUN  19   CREATININE  0.92   GLUCOSE  153*     Additional Labs: Not Applicable    Prior Living " Situation:   Housing / Facility: Assisted Living Residence  Steps Into Home: 0  Steps In Home: 0  Lives with - Patient's Self Care Capacity: Other (Comments) ([facility]; unclear if available for ADL assist )  Equipment Owned: Grab Bar(s) In Tub / Shower, Tub / Shower Seat    Prior Level of Function / Living Situation:   Physical Therapy: Prior Services: Other (Comments) ([facility])  Housing / Facility: Assisted Living Residence  Steps Into Home: 0  Steps In Home: 0  Bathroom Set up: Walk In Shower, Grab Bars, Shower Chair  Equipment Owned: Grab Bar(s) In Tub / Shower, Tub / Shower Seat  Lives with - Patient's Self Care Capacity: Other (Comments) (custodial; unclear if available for ADL assist )  Bed Mobility: Independent  Transfer Status: Independent  Ambulation: Independent  Distance Ambulation (Feet): 150 (community)  Assistive Devices Used: None  Stairs: Independent  Current Level of Function:   Level Of Assist: Unable to Participate  Weight Bearing Status: no restrictions  Supine to Sit:  (NT, in chair upon entry)  Sit to Supine: Minimal Assist (RLE into bed)  Scooting: Stand by Assist  Skilled Intervention: Compensatory Strategies, Facilitation, Sequencing, Verbal Cuing  Sit to Stand: Moderate Assist  Bed, Chair, Wheelchair Transfer: Moderate Assist  Transfer Method: Stand Pivot  Skilled Intervention: Compensatory Strategies, Facilitation, Sequencing, Tactile Cuing, Verbal Cuing  Sitting in Chair: in chair upon entry  Sitting Edge of Bed: 3 min  Standin min  Occupational Therapy:   Self Feeding: Independent  Grooming / Hygiene: Independent  Bathing: Independent  Dressing: Independent  Toileting: Independent  Medication Management: Dependent  Laundry: Unable To Determine At This Time  Finances: Requires Assist  Home Management: Requires Assist  Shopping: Unable To Determine At This Time  Prior Level Of Mobility: Independent Without Device in Community, Independent Without Device in Home  Driving / Transportation: Utilizes  Public Transportation, Walks  Prior Services: Other (Comments) ([facility])  Housing / Facility: Assisted Living Residence  Occupation (Pre-Hospital Vocational): Not Employed (Reports he is trying to obtain disability benefits)  Current Level of Function:   Lower Body Dressing: Total Assist (to don B socks )  Toileting: Not Tested (declined need)  Speech Language Pathology:   Assessment : Pt seen on this date for clinical swallow evaluation. Pt A&Ox4 and agreeable to evaluation. Right-sided facial droop, reduced lingual/labial ROM, underbite, and slowed imprecise speech noted during oral motor evaluation. Pt reporting teeth sensitivity through out session and stated it was easier to chew on right side of mouth than left. PO trials of single ice chips, NTL via teaspoon and cup sip, puree, pudding, soft solids, mixed consistencies, and thin liquids via teaspoon, cup sip, straw sip, and consecutive straw sip and no overt s/sx of aspiration noted. Pt with mildly delayed swallow trigger with teaspoons of liquid bolus, however, with straw sip, swallow trigger was timely and appropriate. Upon palpation, laryngeal elevation noted to be weak but complete. Pt unable to self feed due to pain in left arm from IV and difficulty moving right arm so this clinician had to 1:1 feed pt. At this time, would recommend that pt begin a dysphagia III/thin liquid diet due to teeth sensitivity. Would recommend that pt begin a dysphagia III/thin liquid diet with direct supervision/assistance with feeding as necessary. Dysphagia education provided to pt and he verbalized understanding. SLP to follow.  Problem List: Dysphagia  Diet / Liquid Recommendation: Dysphagia III, Thin Liquid  Rehabilitation Prognosis/Potential: Fair  Estimated Length of Stay:10-14 days    Nursing:   Orientation : Oriented x 4  Continent    Scope/Intensity of Services Recommended:  Physical Therapy: 1 hr / day  5 days / week. Therapeutic Interventions Required: Maximize  Endurance, Mobility, Strength and Safety  Occupational Therapy: 1 hr / day 5 days / week. Therapeutic Interventions Required: Maximize Self Care, ADLs, IADLs and Energy Conservation  Speech & Language Pathology: 1 hr / day 5 days / week. Therapeutic Interventions Required: Maximize Swallowing and Safety  Rehabilitation Nursin/7. Therapeutic Interventions Required: Monitor Skin, Vital Signs, Intake and Output, Labs, Safety and Aspiration Risk  Rehabilitation Physician: 3 - 5 days / week. Therapeutic Interventions Required: Medical Management  Respiratory Care: evaluate . Therapeutic Interventions Required: Pulmonary Toileting  Dietician: consult . Therapeutic Interventions Required: nutritional need.     Rehabilitation Goals and Plan (Expected frequency & duration of treatment in the IRF):   Return to the Community, Modified Independent Level of Care and Minimal Assist Level of Care  Anticipated Date of Rehabilitation Admission: 2019  Patient/Family oriented IRF level of care/facility/plan: Yes  Patient/Family willing to participate in IRF care/facility/plan: Yes  Patient able to tolerate IRF level of care proposed: Yes  Patient has potential to benefit IRF level of care proposed: Yes  Comments: Not Applicable    Special Needs or Precautions - Medical Necessity:  Safety Concerns/Precautions:  Fall Risk / High Risk for Falls, Balance and Cognition  Current Medications:    Current Facility-Administered Medications Ordered in Epic   Medication Dose Route Frequency Provider Last Rate Last Dose   • nystatin (MYCOSTATIN) cream   Topical BID Oliver Bryant M.D.       • busPIRone (BUSPAR) tablet 15 mg  15 mg Oral TID Christ Burt M.D.   Stopped at 19 0800   • atorvastatin (LIPITOR) tablet 80 mg  80 mg Oral Nightly Christ Burt M.D.   80 mg at 19 1703   • divalproex (DEPAKOTE) delayed-release tablet 500 mg  500 mg Oral DAILY Christ Burt M.D.   500 mg at 19 0619   • levothyroxine  (SYNTHROID) tablet 137 mcg  137 mcg Oral AM ES Christ Burt M.D.   137 mcg at 01/03/19 0618   • prazosin (MINIPRESS) capsule 1 mg  1 mg Oral Nightly Christ Burt M.D.   1 mg at 01/02/19 1706   • enoxaparin (LOVENOX) inj 40 mg  40 mg Subcutaneous DAILY Christ Burt M.D.   40 mg at 01/03/19 0618   • acetaminophen (TYLENOL) tablet 650 mg  650 mg Oral Q6HRS PRN Christ Burt M.D.   650 mg at 01/03/19 0915   • cloNIDine (CATAPRES) tablet 0.1 mg  0.1 mg Oral Q6HRS PRN Christ Burt M.D.       • ondansetron (ZOFRAN) syringe/vial injection 4 mg  4 mg Intravenous Q4HRS PRMILE Burt M.D.       • ondansetron (ZOFRAN ODT) dispertab 4 mg  4 mg Oral Q4HRS PRN Christ Burt M.D.       • promethazine (PHENERGAN) tablet 12.5-25 mg  12.5-25 mg Oral Q4HRS PEYMAN Burt M.D.       • promethazine (PHENERGAN) suppository 12.5-25 mg  12.5-25 mg Rectal Q4HRS PRMILE Burt M.D.       • prochlorperazine (COMPAZINE) injection 5-10 mg  5-10 mg Intravenous Q4HRS PRMILE Burt M.D.       • senna-docusate (PERICOLACE or SENOKOT S) 8.6-50 MG per tablet 2 Tab  2 Tab Oral BID Christ Burt M.D.   2 Tab at 01/02/19 1703    And   • polyethylene glycol/lytes (MIRALAX) PACKET 1 Packet  1 Packet Oral QDAY PRN Christ Burt M.D.        And   • magnesium hydroxide (MILK OF MAGNESIA) suspension 30 mL  30 mL Oral QDAY PRN Christ Burt M.D.        And   • bisacodyl (DULCOLAX) suppository 10 mg  10 mg Rectal QDAY PRN Christ Burt M.D.       • aspirin (ASA) tablet 325 mg  325 mg Oral DAILY Christ Burt M.D.   325 mg at 01/03/19 0619    Or   • aspirin (ASA) chewable tab 324 mg  324 mg Oral DAILY Christ Burt M.D.        Or   • aspirin (ASA) suppository 300 mg  300 mg Rectal DAILY Christ Burt M.D.   300 mg at 12/31/18 0609   • divalproex (DEPAKOTE) delayed-release tablet 1,500 mg  1,500 mg Oral Q EVENING Christ Burt M.D.   1,500 mg at 01/02/19 1703   • sertraline (ZOLOFT) tablet 100 mg  100 mg Oral  DAILY Christ Burt M.D.   100 mg at 01/03/19 0619   • montelukast (SINGULAIR) tablet 10 mg  10 mg Oral Nightly Christ Burt M.D.   10 mg at 01/02/19 1705     No current Epic-ordered outpatient prescriptions on file.     Diet:   DIET ORDERS (Through next 24h)    Start     Ordered    12/31/18 1244  Diet Order Regular  ALL MEALS     Question Answer Comment   Diet: Regular    Texture/Fiber modifications: Dysphagia 3(Mechanical Soft)specify fluid consistency(question 6)    Consistency/Fluid modifications: Thin Liquids    Miscellaneous modifications: SLP - Deliver to Nursing Station needs assistance with feeding   Miscellaneous modifications: SLP - 1:1 Supervision by Nursing        12/31/18 1244          Anticipated Discharge Destination / Patient/Family Goal:  Destination: Assisted Living Support System: Attendant  Anticipated home health services: OT, PT, Nursing, Social Work and Aide  Previously used HH service/ provider: Not Applicable  Anticipated DME Needs: Walker  Outpatient Services: OT and PT  Alternative resources to address additional identified needs:   Viraj Velazquez Clinical Admissions Coordinator Signed   Discharge Planning Date of Service: 1/3/2019  9:33 AM         []  Follow up for rehab call out to [facility], prior to hospitalization Norm did require assistance from staff for showering otherwise was independent for mobility and rooming,dressing. The staff at the assisted living was managing medication for Norm. [facility] is able to provide additional care as need to return to their facility. [facility] is able to accomodate a walker and or a wheel chair as needed. Home health may also come to the facility for visits as appropriate. [facility] will have to reassess the return to their facility and the  is Ailin Morales [phone].           Pre-Screen Completed: 1/3/2019 10:27 AM Viraj Velazquez

## 2019-01-03 NOTE — PROGRESS NOTES
Monitor Summary: SB-SR 57-80, OK .20, QRS .10, QT .38 with HR as low as 48 per strip from monitor room

## 2019-01-03 NOTE — PROGRESS NOTES
Pt to d/c to rehab. Called motherLeslie to notify her of discharge to rehab. She verbalized understanding. PIV removed. All personal belongings gathered. Verbal and written discharge instructions provided to  Pt. Pt verbalizes understanding. [facility] at [facility]. They will return my call.     Buspar not on unit to administer to pt. Pharmacy messaged around noon. Medication not on unit at 1320.     Report called to Latonya

## 2019-01-03 NOTE — H&P
REHABILITATION HISTORY AND PHYSICAL/POST ADMISSION PHYSICAL EVALUATION    Date of Admission: 1/3/2019  2:54 PM  Norm Prabhakar  RH03/01    [facility] Code / Diagnosis to Support: 03.9 Other Neurologic  Reason for admission: Conversion disorder    HPI:  Patient is a 38 y.o. male with a PMH of obesity, BPD1, hypothyroidism, HLD, depression, epilepsy and previous TBI vs encephalitis who presented on 12/30/18 with right sided weakness after waking from sleep. Per report at baseline patient lives in group home and has had multiple admissions for weakness with no known etiology in the past.  He was not a candidate for tPA as unclear when symptoms started. Patient’s work-up was negative except that his Depakote level was low.  Patient had CT head and MRI which were negative. EEG was positive for cortical dysfunction over bi-temporal lobes suggested of increased risk of seizures.  Neurology was consulted and found no objective symptoms on examination besides weakness so concern for conversion disorder.   Physiatry was consulted as concern for functional loss and in agreement of conversion disorder vs Santosh’s paralysis.  Patient’s symptoms have been ongoing for over 4 days.     Patient was previously living in a group home with intermittent help.  He was last evaluated by SLP on 12/31/18 and was recommended to have dysphagia 3 with thin liquids.  He was last evaluated by PT on 1/2/18 and was modA for transfers and unable to stand for gait. Per PT note patient was activating right side during transfer including hold arms. Patient was last evaluated by OT On 1/1/18 and was modA for ADLs.     Patient reports he had an infection in his brain in 2010 which left him disabled.  He reports he was scared he had another infection this time. He reports he got up to take his nightly medications when he first noticed the weakness.  Otherwise he reports he is regaining sensation. He reports he was able to move his hand but not his leg.  That  "movement in his hand has now gone away as well. He reports his \"foot is ticklish\" and that is why it moves.  Denies SOB. Denies NVD.     REVIEW OF SYSTEMS:     Comprehensive 14 point ROS was reviewed and all were negative except as noted elsewhere in this document.     PMH:  Past Medical History:   Diagnosis Date   • Anxiety     BIPOLAR   • ASTHMA    • Bipolar 1 disorder (Formerly [facility])    • Depression    • Fall     passed out 2 wks ago   • Glaucoma    • Glaucoma 1982    both eyes/ blind on left eye   • Hypothyroidism    • Indigestion     once in a while   • Mental disorder     learning disabilities; speech impairment; developmental delays   • Murmur     since birth   • Pneumonia     remote   • Psychiatric problem 2002    PTSD   • S/P thyroidectomy    • Seizure (Formerly [facility]) 2010   • Seizure disorder (Formerly [facility])    • Unspecified disorder of thyroid        PSH:  Past Surgical History:   Procedure Laterality Date   • EYE SURGERY     • OTHER      Hernia Repair when he was 10 yrs old   • THYROID LOBECTOMY         FAMILY HISTORY:  Family History   Problem Relation Age of Onset   • Hypertension Mother    • Heart Disease Mother    • Lung Disease Mother    • Stroke Maternal Grandmother    No family history of seizures. Mother with heart diseasee    MEDICATIONS:  Current Facility-Administered Medications   Medication Dose   • Respiratory Care per Protocol     • Pharmacy Consult Request ...Pain Management Review 1 Each  1 Each   • hydrALAZINE (APRESOLINE) tablet 25 mg  25 mg   • acetaminophen (TYLENOL) tablet 650 mg  650 mg   • senna-docusate (PERICOLACE or SENOKOT S) 8.6-50 MG per tablet 2 Tab  2 Tab    And   • polyethylene glycol/lytes (MIRALAX) PACKET 1 Packet  1 Packet    And   • magnesium hydroxide (MILK OF MAGNESIA) suspension 30 mL  30 mL    And   • bisacodyl (DULCOLAX) suppository 10 mg  10 mg   • artificial tears 1.4 % ophthalmic solution 1 Drop  1 Drop   • benzocaine-menthol (CEPACOL) lozenge 1 Lozenge  1 Lozenge   • mag hydrox-al " hydrox-simeth (MAALOX PLUS ES or MYLANTA DS) suspension 20 mL  20 mL   • ondansetron (ZOFRAN ODT) dispertab 4 mg  4 mg    Or   • ondansetron (ZOFRAN) syringe/vial injection 4 mg  4 mg   • traZODone (DESYREL) tablet 50 mg  50 mg   • sodium chloride (OCEAN) 0.65 % nasal spray 2 Spray  2 Spray   • [START ON 1/4/2019] enoxaparin (LOVENOX) inj 40 mg  40 mg   • [START ON 1/4/2019] aspirin (ASA) tablet 325 mg  325 mg   • atorvastatin (LIPITOR) tablet 80 mg  80 mg   • busPIRone (BUSPAR) tablet 15 mg  15 mg   • divalproex (DEPAKOTE) delayed-release tablet 1,500 mg  1,500 mg   • [START ON 1/4/2019] divalproex (DEPAKOTE) delayed-release tablet 500 mg  500 mg   • montelukast (SINGULAIR) tablet 10 mg  10 mg   • nystatin (MYCOSTATIN) cream     • prazosin (MINIPRESS) capsule 1 mg  1 mg   • [START ON 1/4/2019] sertraline (ZOLOFT) tablet 100 mg  100 mg   • [START ON 1/4/2019] levothyroxine (SYNTHROID) tablet 112 mcg  112 mcg    And   • [START ON 1/4/2019] levothyroxine (SYNTHROID) tablet 25 mcg  25 mcg       ALLERGIES:  Abilify and Fish    PSYCHOSOCIAL HISTORY:  Housing / Facility: Assisted Living Residence  Steps Into Home: 0  Steps In Home: 0  Lives with - Patient's Self Care Capacity: Other (Comments) (correction; unclear if available for ADL assist )  Equipment Owned: Grab Bar(s) In Tub / Shower, Tub / Shower Seat     Prior Level of Function / Living Situation:   Physical Therapy: Prior Services: Other (Comments) ([facility])  Housing / Facility: Assisted Living Residence  Steps Into Home: 0  Steps In Home: 0  Bathroom Set up: Walk In Shower, Grab Bars, Shower Chair  Equipment Owned: Grab Bar(s) In Tub / Shower, Tub / Shower Seat  Lives with - Patient's Self Care Capacity: Other (Comments) ([facility]; unclear if available for ADL assist )  Bed Mobility: Independent  Transfer Status: Independent  Ambulation: Independent  Distance Ambulation (Feet): 150 (community)  Assistive Devices Used: None  Stairs: Independent  Current Level of Function:   Level  Of Assist: Unable to Participate  Weight Bearing Status: no restrictions  Supine to Sit:  (NT, in chair upon entry)  Sit to Supine: Minimal Assist (RLE into bed)  Scooting: Stand by Assist  Skilled Intervention: Compensatory Strategies, Facilitation, Sequencing, Verbal Cuing  Sit to Stand: Moderate Assist  Bed, Chair, Wheelchair Transfer: Moderate Assist  Transfer Method: Stand Pivot  Skilled Intervention: Compensatory Strategies, Facilitation, Sequencing, Tactile Cuing, Verbal Cuing  Sitting in Chair: in chair upon entry  Sitting Edge of Bed: 3 min  Standin min  Occupational Therapy:   Self Feeding: Independent  Grooming / Hygiene: Independent  Bathing: Independent  Dressing: Independent  Toileting: Independent  Medication Management: Dependent  Laundry: Unable To Determine At This Time  Finances: Requires Assist  Home Management: Requires Assist  Shopping: Unable To Determine At This Time  Prior Level Of Mobility: Independent Without Device in Community, Independent Without Device in Home  Driving / Transportation: Utilizes Public Transportation, Walks  Prior Services: Other (Comments) ([facility])  Housing / Facility: Assisted Living Residence  Occupation (Pre-Hospital Vocational): Not Employed (Reports he is trying to obtain disability benefits)  Current Level of Function:   Lower Body Dressing: Total Assist (to don B socks )  Toileting: Not Tested (declined need)  Speech Language Pathology:   Assessment : Pt seen on this date for clinical swallow evaluation. Pt A&Ox4 and agreeable to evaluation. Right-sided facial droop, reduced lingual/labial ROM, underbite, and slowed imprecise speech noted during oral motor evaluation. Pt reporting teeth sensitivity through out session and stated it was easier to chew on right side of mouth than left. PO trials of single ice chips, NTL via teaspoon and cup sip, puree, pudding, soft solids, mixed consistencies, and thin liquids via teaspoon, cup sip, straw sip, and consecutive  "straw sip and no overt s/sx of aspiration noted. Pt with mildly delayed swallow trigger with teaspoons of liquid bolus, however, with straw sip, swallow trigger was timely and appropriate. Upon palpation, laryngeal elevation noted to be weak but complete. Pt unable to self feed due to pain in left arm from IV and difficulty moving right arm so this clinician had to 1:1 feed pt. At this time, would recommend that pt begin a dysphagia III/thin liquid diet due to teeth sensitivity. Would recommend that pt begin a dysphagia III/thin liquid diet with direct supervision/assistance with feeding as necessary. Dysphagia education provided to pt and he verbalized understanding. SLP to follow.  Problem List: Dysphagia  Diet / Liquid Recommendation: Dysphagia III, Thin Liquid  Rehabilitation Prognosis/Potential: Fair  Estimated Length of Stay:10-14 days     Nursing:   Orientation : Oriented x 4  Continent     CURRENT LEVEL OF FUNCTION:   Same as level of function prior to admission to [facility]    PHYSICAL EXAM:     VITAL SIGNS:   height is 1.981 m (6' 6\") and weight is 130.5 kg (287 lb 11.2 oz) (abnormal). His oral temperature is 36.4 °C (97.5 °F). His blood pressure is 112/70 and his pulse is 66. His respiration is 18.     GENERAL: No apparent distress  HEENT: EOMI and No nystagmus; anisocoria; left eye not responsive  CARDIAC: Regular rate and rhythm, normal S1, S2   LUNGS: Clear to auscultation   ABDOMINAL: bowel sounds present, soft, nontender and nondistended    EXTREMITIES: no contractures, spasticity, or edema.  No calf tenderness bilaterally  NEURO:  Mental status:  A&Ox4 (person, place, date, situation) answers questions appropriately follows commands  Speech: fluent, no aphasia or dysarthria  CRANIAL NERVES: CN2-12 intact except left eye blind, no facial droop on right  Motor:  LUE and LLE 5/5   No active movement in RUE or RLE  Placed right wrist on siderail and distracted patient and released. " With wrist and finger extension before dropping into flexion  Sensory: Reports no sensation on RUE or RLE except tickling on right babinski  DTRs: 2+ in bilateral biceps and 2+ patellar tendons  R foot withdrawal on Babinski       RADIOLOGY:                      Results for orders placed during the hospital encounter of 12/30/18   CT-CTA NECK WITH & W/O-POST PROCESSING    Impression CT angiogram of the neck within normal limits.     CT Head 12/30/18  Impression         NO ACUTE ABNORMALITIES ARE NOTED ON CT SCAN OF THE HEAD.    Decreased attenuation in the cerebral white matter likely indicates microvascular ischemic disease.     MRI 12/31/18  Impression       1.  No evidence of acute territorial infarct, intracranial hemorrhage or mass lesion.  2.  Unchanged diffuse confluent periventricular white matter signal abnormality throughout both hemispheres consistent with leukoencephalopathy.  3.  Mild diffuse cerebral substance loss.  4.  Redemonstrated enlargement and elongation of the left globe with possible small herniation of the left superolateral aspect which could be consistent with a staphyloma.            LABS:  Recent Labs      01/01/19   0146   SODIUM  139   POTASSIUM  3.9   CHLORIDE  103   CO2  22   GLUCOSE  153*   BUN  19   CREATININE  0.92   CALCIUM  9.0     Recent Labs      01/01/19   0146   WBC  8.7   RBC  4.33*   HEMOGLOBIN  13.2*   HEMATOCRIT  40.3*   MCV  93.1   MCH  30.5   MCHC  32.8*   RDW  42.8   PLATELETCT  292   MPV  9.9             PRIMARY REHAB DIAGNOSIS:    This patient is a 38 y.o. male admitted for acute inpatient rehabilitation with Conversion disorder.    IMPAIRMENTS:   ADLs/IADLs  Mobility  Swallow    SECONDARY DIAGNOSIS/MEDICAL CO-MORBIDITIES AFFECTING FUNCTION:  obesity, BPD1, hypothyroidism, HLD, depression, epilepsy and previous TBI    RELEVANT CHANGES SINCE PREADMISSION EVALUATION:    Status unchanged    The patient's rehabilitation potential is Fair  The patient's medical prognosis  is good    PLAN:   Discussion and Recommendations:   1. The patient requires an acute inpatient rehabilitation program with a coordinated program of care at an intensity and frequency not available at a lower level of care. This recommendation is substantiated by the patient's medical physicians who recommend that the patient's intervention and assessment of medical issues needs to be done at an acute level of care for patient's safety and maximum outcome.   2. A coordinated program of care will be supplied by an interdisciplinary team of physical therapy, occupational therapy, rehab physician, rehab nursing, and, if needed, speech therapy and rehab psychology. Rehab team presents a patient-specific rehabilitation and education program concentrating on prevention of future problems related to accessibility, mobility, skin, bowel, bladder, sexuality, and psychosocial and medical/surgical problems.   3. Need for Rehabilitation Physician: The rehab physician will be evaluating the patient on a multi-weekly basis to help coordinate the program of care. The rehab physician communicates between medical physicians, therapists, and nurses to maximize the patient's potential outcome. Specific areas in which the rehab physician will be providing daily assessment include the following:   A. Assessing the patient's heart rate and blood pressure response (vitals monitoring) to activity and making adjustments in medications or conservative measures as needed.   B. The rehab physician will be assessing the frequency at which the program can be increased to allow the patient to reach optimal functional outcome.   C. The rehab physician will also provide assessments in daily skin care, especially in light of patient's impairments in mobility.   D. The rehab physician will provide special expertise in understanding how to work with functional impairment and recommend appropriate interventions, compensatory techniques, and education  that will facilitate the patient's outcome.   4. Rehab R.N.   The rehab RN will be working with patient to carry over in room mobility and activities of daily living when the patient is not in 3 hours of skilled therapy. Rehab nursing will be working in conjunction with rehab physician to address all the medical issues above and continue to assess laboratory work and discuss abnormalities with the treating physicians, assess vitals, and response to activity, and discuss and report abnormalities with the rehab physician. Rehab RN will also continue daily skin care, supervise bladder/bowel program, instruct in medication administration, and ensure patient safety.   5. Rehab Therapy: Therapies to treat at intensity and frequency of (may change after completion of evaluation by all therapeutic disciplines):       PT:  Physical therapy to address mobility, transfer, gait training and evaluation for adaptive equipment needs 1 hour/day at least 5 days/week for the duration of the ELOS (see below)       OT:  Occupational therapy to address ADLs, self-care, home management training, functional mobility/transfers and assistive device evaluation, and community re-integration 1  hour/day at least 5 days/week for the duration of the ELOS (see below).        ST/Dysphagia:  Speech therapy to address speech, language, and cognitive deficits as well as swallowing difficulties with retraining/dysphagia management and community re-integration with comprehension, expression, cognitive training 1  hour/day at least 5 days/week for the duration of the ELOS (see below).     Medical management / Rehabilitation Issues/ Adverse Potential as part of rehabilitation plan     REHABILITATION ISSUES/ADVERSE POTENTIAL:  1.  Conversion Disorder vs Santosh’s Paralysis - Patient with weakness on right side after waking with history of multiple seizures. Depakote level was low on admission and EEG showed bilateral temporal cortical dysfunction.  Patient  has had multiple admission for similar complaint and no neurologic symptoms other than weakness.  Neurology and psychiatry have followed him in the past.  Patient demonstrates functional deficits in strength, balance, coordination, and ADL's. Patient is admitted to [facility] for comprehensive rehabilitation therapy as described below.   Rehabilitation nursing monitors bowel and bladder control, educates on medication administration, co-morbidities and monitors patient safety.    2.  Neurostimulants: None at this time but continue to assess daily for need to initiate should status change.    3.  DVT prophylaxis:  Patient is on Lovenox for anticoagulation upon transfer. Encourage OOB. Monitor daily for signs and symptoms of DVT including but not limited to swelling and pain to prevent the development of DVT that may interfere with therapies.    4.  GI prophylaxis:  On prilosec to prevent gastritis/dyspepsia which may interfere with therapies.    5.  Pain: No issues with pain currently / Controlled with APAP    6.  Nutrition/Dysphagia: Dietician monitors nutrient intake, recommend supplements prn and provide nutrition education to pt/family to promote optimal nutrition for wound healing/recovery.     7.  Bladder/bowel:  Start bowel and bladder program as described below, to prevent constipation, urinary retention (which may lead to UTI), and urinary incontinence (which will impact upon pt's functional independence).   - Post void bladder scans, I&O cath for PVRs >400  - up to commode after meal     8.  Skin/dermal ulcer prophylaxis: Monitor for new skin conditions with q.2 h. turns as required to prevent the development of skin breakdown.     9.  Cognition/Behavior:  Psychologist Dr. Fortune provides adjustment counseling to illness and psychosocial barriers that may be potential barriers to rehabilitation.     10. Respiratory therapy: RT performs O2 management prn, breathing retraining, pulmonary  hygiene and bronchospasm management prn to optimize participation in therapies.     MEDICAL CO-MORBIDITIES/ADVERSE POTENTIAL AFFECTING FUNCTION:  Conversion Disorder vs Santosh’s Paralysis - Patient with weakness on right side after waking with history of multiple seizures. Depakote level was low on admission and EEG showed bilateral temporal cortical dysfunction.  Patient has had multiple admission for similar complaint and no neurologic symptoms other than weakness.  Neurology and psychiatry have followed him in the past. May be a component of malingering as multiple admissions for similar symptoms at relatively regular occurences  -Less likely stroke, will discontinue ASA  -PT and OT for mobility and ADLs. Most likely will improve with positive encouragement    Seizure disorder - Patient with history of TBI with post-traumatic seizures.  Patient on Depakote 500/1500 mg on transfer.  EEG with bilateral temporal cortical dysfunction.   -Continue Depakote 500 / 1500    BPD1/Depression - Patient on Buspar 15 mg TID and Sertaline on transfer in addition to Depakote.  Unclear if Prazosin is for PTSD as listed as current diagnosis.     Asthma - On singulair nightly at home.     HLD - Cholesterol 209, Triglyceride 416 and HDL 32 on 1/1/19. Started on Atorvastatin 80 mg QHS    Obesity - Patient with BMI of 33 on admission. Will consult dietitian     Hypothyroidism - Patient on 137 mcg daily    DVT Ppx - Patient on Lovenox on transfer.     I personally performed a complete drug regimen review and no potential clinically significant medication issues were identified.     Pt was seen today for 85 min, and entire time spent in face-to-face contact was >50% in counseling and coordination of care as detailed in A/P above.        GOALS/EXPECTED LEVEL OF FUNCTION BASED ON CURRENT MEDICAL AND FUNCTIONAL STATUS (may change based on patient's medical status and rate of impairment recovery):  Transfers:   Modified  Independent  Mobility/Gait:   Modified Independent  ADL's:   Modified Independent  Swallowing:  regular    DISPOSITION: Discharge to pre-morbid independent living setting with the supportive care of patient's caregiver(s) and community resources.      ELOS: 7-14 days

## 2019-01-04 LAB
25(OH)D3 SERPL-MCNC: 23 NG/ML (ref 30–100)
ALBUMIN SERPL BCP-MCNC: 4.1 G/DL (ref 3.2–4.9)
ALBUMIN/GLOB SERPL: 2.1 G/DL
ALP SERPL-CCNC: 53 U/L (ref 30–99)
ALT SERPL-CCNC: 10 U/L (ref 2–50)
ANION GAP SERPL CALC-SCNC: 11 MMOL/L (ref 0–11.9)
AST SERPL-CCNC: 10 U/L (ref 12–45)
BASOPHILS # BLD AUTO: 0.9 % (ref 0–1.8)
BASOPHILS # BLD: 0.08 K/UL (ref 0–0.12)
BILIRUB SERPL-MCNC: 0.4 MG/DL (ref 0.1–1.5)
BUN SERPL-MCNC: 16 MG/DL (ref 8–22)
CALCIUM SERPL-MCNC: 8.9 MG/DL (ref 8.5–10.5)
CHLORIDE SERPL-SCNC: 104 MMOL/L (ref 96–112)
CO2 SERPL-SCNC: 26 MMOL/L (ref 20–33)
CREAT SERPL-MCNC: 0.86 MG/DL (ref 0.5–1.4)
EOSINOPHIL # BLD AUTO: 0.22 K/UL (ref 0–0.51)
EOSINOPHIL NFR BLD: 2.3 % (ref 0–6.9)
ERYTHROCYTE [DISTWIDTH] IN BLOOD BY AUTOMATED COUNT: 42 FL (ref 35.9–50)
GLOBULIN SER CALC-MCNC: 2 G/DL (ref 1.9–3.5)
GLUCOSE SERPL-MCNC: 118 MG/DL (ref 65–99)
HCT VFR BLD AUTO: 41.2 % (ref 42–52)
HGB BLD-MCNC: 13.6 G/DL (ref 14–18)
IMM GRANULOCYTES # BLD AUTO: 0.07 K/UL (ref 0–0.11)
IMM GRANULOCYTES NFR BLD AUTO: 0.7 % (ref 0–0.9)
LYMPHOCYTES # BLD AUTO: 4 K/UL (ref 1–4.8)
LYMPHOCYTES NFR BLD: 42.6 % (ref 22–41)
MCH RBC QN AUTO: 30.5 PG (ref 27–33)
MCHC RBC AUTO-ENTMCNC: 33 G/DL (ref 33.7–35.3)
MCV RBC AUTO: 92.4 FL (ref 81.4–97.8)
MONOCYTES # BLD AUTO: 0.84 K/UL (ref 0–0.85)
MONOCYTES NFR BLD AUTO: 9 % (ref 0–13.4)
NEUTROPHILS # BLD AUTO: 4.17 K/UL (ref 1.82–7.42)
NEUTROPHILS NFR BLD: 44.5 % (ref 44–72)
NRBC # BLD AUTO: 0 K/UL
NRBC BLD-RTO: 0 /100 WBC
PLATELET # BLD AUTO: 264 K/UL (ref 164–446)
PMV BLD AUTO: 10.2 FL (ref 9–12.9)
POTASSIUM SERPL-SCNC: 4.2 MMOL/L (ref 3.6–5.5)
PROT SERPL-MCNC: 6.1 G/DL (ref 6–8.2)
RBC # BLD AUTO: 4.46 M/UL (ref 4.7–6.1)
SODIUM SERPL-SCNC: 141 MMOL/L (ref 135–145)
T4 FREE SERPL-MCNC: 0.74 NG/DL (ref 0.53–1.43)
TSH SERPL DL<=0.005 MIU/L-ACNC: 11.64 UIU/ML (ref 0.38–5.33)
WBC # BLD AUTO: 9.4 K/UL (ref 4.8–10.8)

## 2019-01-04 PROCEDURE — 36415 COLL VENOUS BLD VENIPUNCTURE: CPT

## 2019-01-04 PROCEDURE — 97162 PT EVAL MOD COMPLEX 30 MIN: CPT

## 2019-01-04 PROCEDURE — 97530 THERAPEUTIC ACTIVITIES: CPT

## 2019-01-04 PROCEDURE — 99233 SBSQ HOSP IP/OBS HIGH 50: CPT | Performed by: PHYSICAL MEDICINE & REHABILITATION

## 2019-01-04 PROCEDURE — 700102 HCHG RX REV CODE 250 W/ 637 OVERRIDE(OP): Performed by: PHYSICAL MEDICINE & REHABILITATION

## 2019-01-04 PROCEDURE — 84439 ASSAY OF FREE THYROXINE: CPT

## 2019-01-04 PROCEDURE — 92610 EVALUATE SWALLOWING FUNCTION: CPT

## 2019-01-04 PROCEDURE — A9270 NON-COVERED ITEM OR SERVICE: HCPCS | Performed by: PHYSICAL MEDICINE & REHABILITATION

## 2019-01-04 PROCEDURE — 85025 COMPLETE CBC W/AUTO DIFF WBC: CPT

## 2019-01-04 PROCEDURE — 84443 ASSAY THYROID STIM HORMONE: CPT

## 2019-01-04 PROCEDURE — 770010 HCHG ROOM/CARE - REHAB SEMI PRIVAT*

## 2019-01-04 PROCEDURE — 97166 OT EVAL MOD COMPLEX 45 MIN: CPT

## 2019-01-04 PROCEDURE — 80053 COMPREHEN METABOLIC PANEL: CPT

## 2019-01-04 PROCEDURE — 82306 VITAMIN D 25 HYDROXY: CPT

## 2019-01-04 PROCEDURE — 92523 SPEECH SOUND LANG COMPREHEN: CPT

## 2019-01-04 PROCEDURE — 700111 HCHG RX REV CODE 636 W/ 250 OVERRIDE (IP): Performed by: PHYSICAL MEDICINE & REHABILITATION

## 2019-01-04 PROCEDURE — 97535 SELF CARE MNGMENT TRAINING: CPT

## 2019-01-04 RX ADMIN — SERTRALINE HYDROCHLORIDE 100 MG: 50 TABLET ORAL at 09:34

## 2019-01-04 RX ADMIN — PRAZOSIN HYDROCHLORIDE 1 MG: 1 CAPSULE ORAL at 21:05

## 2019-01-04 RX ADMIN — ATORVASTATIN CALCIUM 80 MG: 40 TABLET, FILM COATED ORAL at 20:55

## 2019-01-04 RX ADMIN — BUSPIRONE HYDROCHLORIDE 15 MG: 15 TABLET ORAL at 15:24

## 2019-01-04 RX ADMIN — LEVOTHYROXINE SODIUM 112 MCG: 112 TABLET ORAL at 05:41

## 2019-01-04 RX ADMIN — BUSPIRONE HYDROCHLORIDE 15 MG: 15 TABLET ORAL at 20:56

## 2019-01-04 RX ADMIN — BUSPIRONE HYDROCHLORIDE 15 MG: 15 TABLET ORAL at 09:34

## 2019-01-04 RX ADMIN — DIVALPROEX SODIUM 500 MG: 500 TABLET, DELAYED RELEASE ORAL at 09:35

## 2019-01-04 RX ADMIN — STANDARDIZED SENNA CONCENTRATE AND DOCUSATE SODIUM 2 TABLET: 8.6; 5 TABLET, FILM COATED ORAL at 09:34

## 2019-01-04 RX ADMIN — NYSTATIN: 100000 CREAM TOPICAL at 09:00

## 2019-01-04 RX ADMIN — NYSTATIN 1 UNITS: 100000 CREAM TOPICAL at 20:54

## 2019-01-04 RX ADMIN — DIVALPROEX SODIUM 1500 MG: 500 TABLET, DELAYED RELEASE ORAL at 20:55

## 2019-01-04 RX ADMIN — ENOXAPARIN SODIUM 40 MG: 100 INJECTION SUBCUTANEOUS at 09:36

## 2019-01-04 RX ADMIN — LEVOTHYROXINE SODIUM 25 MCG: 25 TABLET ORAL at 05:41

## 2019-01-04 RX ADMIN — MONTELUKAST SODIUM 10 MG: 10 TABLET, FILM COATED ORAL at 20:55

## 2019-01-04 ASSESSMENT — BRIEF INTERVIEW FOR MENTAL STATUS (BIMS)
WHAT MONTH IS IT: ACCURATE WITHIN 5 DAYS
WHAT DAY OF THE WEEK IS IT: INCORRECT
WHAT YEAR IS IT: CORRECT
ASKED TO RECALL BED: YES, NO CUE REQUIRED
INITIAL REPETITION OF BED BLUE SOCK - FIRST ATTEMPT: 3
ASKED TO RECALL SOCK: YES, NO CUE REQUIRED
ASKED TO RECALL BLUE: NO, COULD NOT RECALL
BIMS SUMMARY SCORE: 12

## 2019-01-04 ASSESSMENT — ACTIVITIES OF DAILY LIVING (ADL): TOILETING: INDEPENDENT

## 2019-01-04 ASSESSMENT — PAIN SCALES - GENERAL
PAINLEVEL_OUTOF10: 0
PAINLEVEL_OUTOF10: 0

## 2019-01-04 ASSESSMENT — GAIT ASSESSMENTS: GAIT LEVEL OF ASSIST: UNABLE TO PARTICIPATE

## 2019-01-04 NOTE — PROGRESS NOTES
"Rehab Progress Note     Encounter Date: 1/4/2019    CC: Conversion disorder; right sided weakness    Interval Events (Subjective)  Patient sitting up in room. He reports no improvement in right sided strength or sensation. Reviewed admission labs including mild anemia, mild vitamin D deficiency, and elevated TSH/normal T4.  Patient reports his thyroid levels have been stable. Denies NVD. Denies SOB.     Objective:  VITAL SIGNS: /56   Pulse (!) 56   Temp 36.3 °C (97.4 °F) (Temporal)   Resp 18   Ht 1.981 m (6' 6\")   Wt (!) 130.5 kg (287 lb 11.2 oz)   SpO2 95%   BMI 33.25 kg/m²   Gen: NAD  Psych: Mood and affect appropriate  CV: RRR, no edema  Resp: CTAB, no upper airway sounds  Abd: NTND  Neuro: AOx4, No active movement on right side    Recent Results (from the past 72 hour(s))   CBC with Differential    Collection Time: 01/04/19  5:39 AM   Result Value Ref Range    WBC 9.4 4.8 - 10.8 K/uL    RBC 4.46 (L) 4.70 - 6.10 M/uL    Hemoglobin 13.6 (L) 14.0 - 18.0 g/dL    Hematocrit 41.2 (L) 42.0 - 52.0 %    MCV 92.4 81.4 - 97.8 fL    MCH 30.5 27.0 - 33.0 pg    MCHC 33.0 (L) 33.7 - 35.3 g/dL    RDW 42.0 35.9 - 50.0 fL    Platelet Count 264 164 - 446 K/uL    MPV 10.2 9.0 - 12.9 fL    Neutrophils-Polys 44.50 44.00 - 72.00 %    Lymphocytes 42.60 (H) 22.00 - 41.00 %    Monocytes 9.00 0.00 - 13.40 %    Eosinophils 2.30 0.00 - 6.90 %    Basophils 0.90 0.00 - 1.80 %    Immature Granulocytes 0.70 0.00 - 0.90 %    Nucleated RBC 0.00 /100 WBC    Neutrophils (Absolute) 4.17 1.82 - 7.42 K/uL    Lymphs (Absolute) 4.00 1.00 - 4.80 K/uL    Monos (Absolute) 0.84 0.00 - 0.85 K/uL    Eos (Absolute) 0.22 0.00 - 0.51 K/uL    Baso (Absolute) 0.08 0.00 - 0.12 K/uL    Immature Granulocytes (abs) 0.07 0.00 - 0.11 K/uL    NRBC (Absolute) 0.00 K/uL   Comp Metabolic Panel (CMP)    Collection Time: 01/04/19  5:39 AM   Result Value Ref Range    Sodium 141 135 - 145 mmol/L    Potassium 4.2 3.6 - 5.5 mmol/L    Chloride 104 96 - 112 mmol/L    " Co2 26 20 - 33 mmol/L    Anion Gap 11.0 0.0 - 11.9    Glucose 118 (H) 65 - 99 mg/dL    Bun 16 8 - 22 mg/dL    Creatinine 0.86 0.50 - 1.40 mg/dL    Calcium 8.9 8.5 - 10.5 mg/dL    AST(SGOT) 10 (L) 12 - 45 U/L    ALT(SGPT) 10 2 - 50 U/L    Alkaline Phosphatase 53 30 - 99 U/L    Total Bilirubin 0.4 0.1 - 1.5 mg/dL    Albumin 4.1 3.2 - 4.9 g/dL    Total Protein 6.1 6.0 - 8.2 g/dL    Globulin 2.0 1.9 - 3.5 g/dL    A-G Ratio 2.1 g/dL   TSH with Reflex to FT4    Collection Time: 01/04/19  5:39 AM   Result Value Ref Range    TSH 11.640 (H) 0.380 - 5.330 uIU/mL   Vitamin D, 25-hydroxy (blood)    Collection Time: 01/04/19  5:39 AM   Result Value Ref Range    25-Hydroxy   Vitamin D 25 23 (L) 30 - 100 ng/mL   ESTIMATED GFR    Collection Time: 01/04/19  5:39 AM   Result Value Ref Range    GFR If African American >60 >60 mL/min/1.73 m 2    GFR If Non African American >60 >60 mL/min/1.73 m 2   FREE THYROXINE    Collection Time: 01/04/19  5:39 AM   Result Value Ref Range    Free T-4 0.74 0.53 - 1.43 ng/dL       Current Facility-Administered Medications   Medication Frequency   • [START ON 1/5/2019] vitamin D (cholecalciferol) tablet 1,000 Units DAILY   • Respiratory Care per Protocol Continuous RT   • Pharmacy Consult Request ...Pain Management Review 1 Each PRN   • hydrALAZINE (APRESOLINE) tablet 25 mg Q8HRS PRN   • acetaminophen (TYLENOL) tablet 650 mg Q4HRS PRN   • senna-docusate (PERICOLACE or SENOKOT S) 8.6-50 MG per tablet 2 Tab BID    And   • polyethylene glycol/lytes (MIRALAX) PACKET 1 Packet QDAY PRN    And   • magnesium hydroxide (MILK OF MAGNESIA) suspension 30 mL QDAY PRN    And   • bisacodyl (DULCOLAX) suppository 10 mg QDAY PRN   • artificial tears 1.4 % ophthalmic solution 1 Drop PRN   • benzocaine-menthol (CEPACOL) lozenge 1 Lozenge Q2HRS PRN   • mag hydrox-al hydrox-simeth (MAALOX PLUS ES or MYLANTA DS) suspension 20 mL Q2HRS PRN   • ondansetron (ZOFRAN ODT) dispertab 4 mg 4X/DAY PRN    Or   • ondansetron (ZOFRAN)  syringe/vial injection 4 mg 4X/DAY PRN   • traZODone (DESYREL) tablet 50 mg QHS PRN   • sodium chloride (OCEAN) 0.65 % nasal spray 2 Spray PRN   • enoxaparin (LOVENOX) inj 40 mg DAILY   • atorvastatin (LIPITOR) tablet 80 mg Nightly   • busPIRone (BUSPAR) tablet 15 mg TID   • divalproex (DEPAKOTE) delayed-release tablet 1,500 mg Q EVENING   • divalproex (DEPAKOTE) delayed-release tablet 500 mg DAILY   • montelukast (SINGULAIR) tablet 10 mg Nightly   • nystatin (MYCOSTATIN) cream BID   • prazosin (MINIPRESS) capsule 1 mg Nightly   • sertraline (ZOLOFT) tablet 100 mg DAILY   • levothyroxine (SYNTHROID) tablet 112 mcg AM ES    And   • levothyroxine (SYNTHROID) tablet 25 mcg AM ES   • albuterol inhaler 2 Puff Q4HRS PRN       Orders Placed This Encounter   Procedures   • Diet Order Regular     Standing Status:   Standing     Number of Occurrences:   1     Order Specific Question:   Diet:     Answer:   Regular [1]     Order Specific Question:   Texture/Fiber modifications:     Answer:   Dysphagia 3(Mechanical Soft)specify fluid consistency(question 6) [3]     Order Specific Question:   Consistency/Fluid modifications:     Answer:   Thin Liquids [3]       Assessment:  Active Hospital Problems    Diagnosis   • *Conversion disorder   • Right sided weakness   • Bipolar disorder (HCC)   • Acquired hypothyroidism   • Depression   • HLD (hyperlipidemia)   • Seizure (HCC)   • Asthma       Medical Decision Making and Plan:  Conversion Disorder vs Santosh’s Paralysis - Patient with weakness on right side after waking with history of multiple seizures. Depakote level was low on admission and EEG showed bilateral temporal cortical dysfunction.  Patient has had multiple admission for similar complaint and no neurologic symptoms other than weakness.  Neurology and psychiatry have followed him in the past. May be a component of malingering as multiple admissions for similar symptoms at relatively regular occurences  -Less likely stroke,  will discontinue ASA  -PT and OT for mobility and ADLs. Most likely will improve with positive encouragement     Seizure disorder - Patient with history of TBI with post-traumatic seizures.  Patient on Depakote 500/1500 mg on transfer.  EEG with bilateral temporal cortical dysfunction.   -Continue Depakote 500 / 1500     BPD1/Depression - Patient on Buspar 15 mg TID and Sertaline on transfer in addition to Depakote.  Unclear if Prazosin is for PTSD as listed as current diagnosis.      Asthma - On Singulair nightly at home.     HLD - Cholesterol 209, Triglyceride 416 and HDL 32 on 1/1/19. Started on Atorvastatin 80 mg QHS     Obesity - Patient with BMI of 33 on admission. Will consult dietitian      Anemia - 13.6 on admission up from 13.2 on 1/1/19    DM - History of DM, A1c 6.6 on admission. He reports being diet controlled. Will continue discussion    Hypothyroidism - Patient on 137 mcg daily     Vitamin D - 23 on admission, started on 1000 U supplement    DVT Ppx - Patient on Lovenox on transfer.     Total time:  35 minutes.  I spent greater than 50% of the time for patient care and coordination on this date, including unit/floor time, and face-to-face time with the patient as per assessment and plan above.  Discussed admission labs including anemia, DM, vitamin D and discussed with therapy team about possible conversion disorder.     Karan Smith M.D.

## 2019-01-04 NOTE — FLOWSHEET NOTE
01/03/19 1607   Interdisciplinary Plan of Care-Goals (Indications)   Bronchodilator Indications History / Diagnosis;Strong Subjective / Objective Improvement   Chest Exam   Respiration 18   Pulse (!) 55   Breath Sounds   RML Breath Sounds Diminished   RLL Breath Sounds Diminished   LLL Breath Sounds Diminished   Secretions   Cough Non Productive   Oximetry   #Pulse Oximetry (Single Determination) Yes   Oxygen   Home O2 Use Prior To Admission? No   Pulse Oximetry 96 %   O2 Daily Delivery Respiratory  Room Air with O2 Available

## 2019-01-04 NOTE — PROGRESS NOTES
Admit to [facility]  Dr. Smith to follow for diagnosis of Seizures and Conversion Disorder.  Initial assessment initiated. Orientation to [facility] process, safety in room, bathroom, and call light.    Client/family goal strengthening.    Dr. Smith notified at 1400    The Rehabilitation Interdisciplinary Plan Of Care(s) intitiated are as follows:  Impaired Physical Mobility.      Education binder given to patient and explained.      Patient is alert and oriented x 4.  Skin warm and dry, respirations even and unlabored.   Lung sounds are clear.  Bowel sounds present in all 4 quadrants.    Patient is unable to move right arm or right leg.

## 2019-01-04 NOTE — DISCHARGE PLANNING
CASE MANAGEMENT INITIAL ASSESSMENT    Admit Date:  1/3/2019     I met with patient to discuss role of case management / discharge planning / team conference.   Patient is a  38 y.o. male transferred from [facility] 1.3.19, s/p conversion disorder w/ recurrent right paresis, unknown etiology. PMHx: bipolar 1 disorder, seizure disorder, PTSD, developmental delay, DLD, asthma, former smoker, obesity. PLOF: lives at [facility], receives assistance w/ med mgmt, meals & housekeeping, otherwise independent ADLs.     Admitting MD: Dr. Benjamin Smith    Diagnosis: 03.9 other neuro  Seizure (HCC)  Conversion disorder    Co-morbidities:   Patient Active Problem List    Diagnosis Date Noted   • Intractable abdominal pain 07/28/2018     Priority: High   • Acute right-sided weakness 04/17/2014     Priority: High   • Right sided weakness 08/28/2013     Priority: High   • Conversion disorder 03/15/2013     Priority: High   • Urticaria of entire body 08/19/2018     Priority: Medium   • Leukocytosis, stress-, and streoid-related 08/19/2018     Priority: Medium   • Class 1 obesity due to excess calories with serious comorbidity and body mass index (BMI) of 31.0 to 31.9 in adult 07/28/2018     Priority: Low   • Depression 03/06/2016     Priority: Low   • Bipolar disorder ([facility]) 12/02/2014     Priority: Low   • Psychiatric problem      Priority: Low   • Glaucoma 08/15/2011     Priority: Low   • Acquired hypothyroidism 08/15/2011     Priority: Low   • Depression 08/15/2011     Priority: Low   • Diabetes mellitus type 2 in obese ([facility]) 01/03/2019   • HLD (hyperlipidemia) 08/19/2018   • Pre-ulcerative corn or callous 02/04/2018   • Type 2 diabetes mellitus without complication, without long-term current use of insulin ([facility]) 02/02/2018   • Right hemiparesis ([facility]) 09/12/2017   • Hyperglycemia 09/12/2017   • PTSD (post-traumatic stress disorder) 03/06/2016   • Pansinusitis 05/27/2015   • Change in mental status 05/26/2015   • Seizure ([facility])  "12/01/2014   • Weakness 12/01/2014   • Patient non adherence 10/16/2014   • Headache 08/06/2014   • Syncope 07/27/2014   • Anemia 05/03/2013   • Thyroid mass of unclear etiology 12/06/2012   • Paralysis on one side of body (HCC) 08/05/2012   • Asthma 08/15/2011   • Bradycardia 08/15/2011   • Hemiparesis (HCC) 08/15/2011     Prior Living Situation:  Housing / Facility: Assisted Living Residence: [facility] with - Patient's Self Care Capacity: Attendant / Paid Care Giver    Prior Level of Function:  Medication Management: Requires Assist  Finances: Requires Assist  Home Management: Requires Assist  Shopping: Requires Assist  Prior Level Of Mobility: Independent Without Device in Community, Independent Without Device in Home  Driving / Transportation: Utilizes Public Transportation (bus)    Support Systems:  Primary : Candance Turentine mother, [phone]  Other support systems: none provided  Advance Directives: No  Power of  (Name & Phone): none    Previous Services Utilized:   Equipment Owned: None  Prior Services: Intermittent Physical Support for ADL Per Service    Other Information:  Occupation (Pre-Hospital Vocational): Not Employed     Primary Payor Source: Medicaid  Primary Care Practitioner : Lori PRETTY [phone]    Patient / Family Goal:  Patient / Family Goal: for right side to move again    Additional Case Management Questions:  Have you ever received case management services for yourself or a family member? unknown    Do you feel you have and an understanding of what services  provide? I guess    Do you have any additional questions regarding case management?  Not at this time      CASE MANAGEMENT PLAN OF CARE   Individualized Goals:   1. To return to PLOF & penitentiary.  2. To increase functional mobility, \"for right side to move again.\"  3. Assess for DME needs & f/u MD appts.    Barriers:   1. Functional mobility deficit.  2. Dysphagia.      Plan:  1. " Continue to follow patient through hospitalization and provide discharge planning in collaboration with patient, family, physicians and ancillary services.     2. Utilize community resources to ensure a safe discharge.

## 2019-01-04 NOTE — FLOWSHEET NOTE
01/03/19 1634   Events/Summary/Plan   Events/Summary/Plan pulse recheck manually   Chest Exam   Pulse 60

## 2019-01-04 NOTE — THERAPY
"Occupational Therapy Initial Plan of Care Note    1) Assessment:  Patient is 38 y.o. male with a diagnosis of Conversion disorder; right sided weakness  Additional factors influencing patient status / progress (ie: cognitive factors, co-morbidities, social support, etc):\"PMH of obesity, BPD1, hypothyroidism, HLD, depression, epilepsy and previous TBI vs encephalitis who presented on 12/30/18 with right sided weakness after waking from sleep.\" Pt presents to [facility] w/ R sided weakness, yet was able to demonstrate use of RUE w/ ADLs at evaluation, more proximal than distal. 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 Pt below baseline for ADLs, IADLs, functional Per report at baseline patient lives in group home and has had multiple admissions for weakness with no known etiology in the past.  .  Long term and short term goals have been discussed with patient and they are in agreement.  2) Plan:  Recommend Occupational Therapy  minutes per day 5-7 days per week for 2-3 weeks for the following treatments: OT Orthotics Training, OT E Stim Attended, OT Group Therapy, OT Self Care/ADL, OT Cognitive Skill Dev, OT Community Reintegration, OT Manual Ther Technique, OT Neuro Re-Ed/Balance, OT Sensory Int Techniques, OT Aquatic, OT Therapeutic Activity, OT Evaluation and OT Therapeutic Exercise.  3) Goals:  Please refer to care plan for goals.           "

## 2019-01-04 NOTE — THERAPY
Speech Therapy Initial Plan of Care Note    1) Assessment:  Patient is 38 y.o. male with a diagnosis of conversion disorder.  Additional factors influencing patient status / progress (ie: cognitive factors, co-morbidities, social support, etc): mild cognitive deficits in attn, recall, and word finding.  Long term and short term goals have been discussed with patient and they are in agreement.  2) Plan:  Recommend Speech Therapy 30-60 minutes per day 5-6 days per week for 4 weeks for the following treatments:  SLP Aphasia Evaluation, SLP Oral Pharyngeal Evaluation, SLP Cognitive Skill Development and SLP Group Treatment and Group Treatment  3) Goals:  Please refer to care plan for goals.

## 2019-01-04 NOTE — CARE PLAN
Problem: Communication  Goal: The ability to communicate needs accurately and effectively will improve  Outcome: PROGRESSING AS EXPECTED  Pt is AOx4 and is able to communicate all needs effectively with staff members. Call light is within reach at bedside and pt educated on use.    Problem: Skin Integrity  Goal: Risk for impaired skin integrity will decrease  Outcome: PROGRESSING AS EXPECTED  Pt's skin remains intact and free of new or accidental injury. Nystatin cream applied to rash as ordered. Pt is able to turn himself from side to side while in bed.

## 2019-01-04 NOTE — THERAPY
Physical Therapy Initial Plan of Care Note    1) Assessment: Patient is 38 y.o. male with a diagnosis of R hemiparesis thought to be conversion disorder vs. Santosh's paralysis.  Additional factors influencing patient status / progress (ie: cognitive factors, co-morbidities, social support, etc): independent PLOF, can get assistance after D/C at group home, PMH of obesity, BPD1, hypothyroidism, HLD, depression, epilepsy and previous TBI vs encephalitis.  Long term and short term goals have been discussed with patient and they are in agreement.  2) Plan: Recommend Physical Therapy  minutes per day 5-7 days per week for 2 weeks for the following treatments:  PT Group Therapy, PT E Stim Attended, PT Gait Training, PT Therapeutic Exercises, PT Neuro Re-Ed/Balance, PT Aquatic Therapy, PT Therapeutic Activity, PT Manual Therapy and PT Evaluation.  3) Goals:  Please refer to care plan for goals.

## 2019-01-04 NOTE — CARE PLAN
Problem: Communication  Goal: The ability to communicate needs accurately and effectively will improve  Patient alert and oriented x 4.    Problem: Safety  Goal: Will remain free from falls  Call light within reach, patient instructed to use call light to call for assistance.    Personal items at bedside, within reach.      Problem: Skin Integrity  Goal: Risk for impaired skin integrity will decrease  Rash noted to left arm and abdomen.  Patient states he uses [place] soap and it caused a rash, he also states that he has done this in the past when using this particular soap.

## 2019-01-05 PROCEDURE — G0515 COGNITIVE SKILLS DEVELOPMENT: HCPCS

## 2019-01-05 PROCEDURE — 700111 HCHG RX REV CODE 636 W/ 250 OVERRIDE (IP): Performed by: PHYSICAL MEDICINE & REHABILITATION

## 2019-01-05 PROCEDURE — 770010 HCHG ROOM/CARE - REHAB SEMI PRIVAT*

## 2019-01-05 PROCEDURE — 700102 HCHG RX REV CODE 250 W/ 637 OVERRIDE(OP): Performed by: PHYSICAL MEDICINE & REHABILITATION

## 2019-01-05 PROCEDURE — A9270 NON-COVERED ITEM OR SERVICE: HCPCS | Performed by: PHYSICAL MEDICINE & REHABILITATION

## 2019-01-05 PROCEDURE — 97530 THERAPEUTIC ACTIVITIES: CPT

## 2019-01-05 PROCEDURE — 97110 THERAPEUTIC EXERCISES: CPT

## 2019-01-05 PROCEDURE — 97535 SELF CARE MNGMENT TRAINING: CPT

## 2019-01-05 PROCEDURE — 97112 NEUROMUSCULAR REEDUCATION: CPT

## 2019-01-05 PROCEDURE — 97116 GAIT TRAINING THERAPY: CPT

## 2019-01-05 RX ADMIN — ATORVASTATIN CALCIUM 80 MG: 40 TABLET, FILM COATED ORAL at 20:24

## 2019-01-05 RX ADMIN — DIVALPROEX SODIUM 500 MG: 500 TABLET, DELAYED RELEASE ORAL at 10:20

## 2019-01-05 RX ADMIN — DIVALPROEX SODIUM 1500 MG: 500 TABLET, DELAYED RELEASE ORAL at 20:25

## 2019-01-05 RX ADMIN — SERTRALINE HYDROCHLORIDE 100 MG: 50 TABLET ORAL at 10:20

## 2019-01-05 RX ADMIN — STANDARDIZED SENNA CONCENTRATE AND DOCUSATE SODIUM 2 TABLET: 8.6; 5 TABLET, FILM COATED ORAL at 20:25

## 2019-01-05 RX ADMIN — BUSPIRONE HYDROCHLORIDE 15 MG: 15 TABLET ORAL at 10:20

## 2019-01-05 RX ADMIN — BUSPIRONE HYDROCHLORIDE 15 MG: 15 TABLET ORAL at 15:19

## 2019-01-05 RX ADMIN — NYSTATIN: 100000 CREAM TOPICAL at 20:25

## 2019-01-05 RX ADMIN — ENOXAPARIN SODIUM 40 MG: 100 INJECTION SUBCUTANEOUS at 10:20

## 2019-01-05 RX ADMIN — LEVOTHYROXINE SODIUM 25 MCG: 25 TABLET ORAL at 05:25

## 2019-01-05 RX ADMIN — NYSTATIN: 100000 CREAM TOPICAL at 10:21

## 2019-01-05 RX ADMIN — LEVOTHYROXINE SODIUM 112 MCG: 112 TABLET ORAL at 05:24

## 2019-01-05 RX ADMIN — VITAMIN D, TAB 1000IU (100/BT) 1000 UNITS: 25 TAB at 10:20

## 2019-01-05 RX ADMIN — BUSPIRONE HYDROCHLORIDE 15 MG: 15 TABLET ORAL at 20:25

## 2019-01-05 RX ADMIN — STANDARDIZED SENNA CONCENTRATE AND DOCUSATE SODIUM 2 TABLET: 8.6; 5 TABLET, FILM COATED ORAL at 10:20

## 2019-01-05 RX ADMIN — MONTELUKAST SODIUM 10 MG: 10 TABLET, FILM COATED ORAL at 20:25

## 2019-01-05 RX ADMIN — PRAZOSIN HYDROCHLORIDE 1 MG: 1 CAPSULE ORAL at 20:25

## 2019-01-05 ASSESSMENT — PAIN SCALES - GENERAL
PAINLEVEL_OUTOF10: 0

## 2019-01-05 NOTE — CARE PLAN
Problem: Safety  Goal: Will remain free from injury    Intervention: Provide assistance with mobility  Needed assist of 1 for transfers with a second person as standby considering his height and weight. Contines to have a very weak Rt side.

## 2019-01-05 NOTE — CARE PLAN
Problem: Safety  Goal: Will remain free from injury  Encourage to use call light for assist to toilet/transfer. Used call light for assist. Bed in low position, call light within reach.    Problem: Pain Management  Goal: Pain level will decrease to patient's comfort goal  No c/o pain, no request for PRN pain med at HS.

## 2019-01-05 NOTE — THERAPY
Recreational Therapy Initial Plan of Care Note    1) Assessment:  Patient is 38 y.o. male with a diagnosis of PMH of obesity, BPD1, hypothyroidism, HLD, depression, epilepsy and previous TBI vs encephalitis who presented on 12/30/18 with right sided weakness after waking from sleep.  Additional factors influencing patient status / progress (ie: cognitive factors, co-morbidities, social support, etc): supportive group home, independent PLOF, BPD1, ability to access public transportation, involved in group sport recreation.  Long term and short term goals have been discussed with patient and they are in agreement.  2) Plan:  Recommend Recreational Therapy 30-60 minutes per day 3-4 days per week for 3 weeks for the following treatments:  Community Re-Integration; Community, Skills Development; Leisure Skills Awareness; Leisure Skills Development; Social Skills Training; Cognitive Skills Training; Gross Motor Functional Leisure Skills; Fine Motor Functional Leisure Skills; Group Treatment  3) Goals:  Please refer to care plan for goals.

## 2019-01-05 NOTE — PROGRESS NOTES
Received patient during shift change, report rec'd from day shift RN. Resting in bed, VS stable on room air. Per report continent of B&B, Mod-Max asssit w/transfers, R side weakness. A&O x 4, able to make needs known. Bed in low position, call light within reach.

## 2019-01-05 NOTE — CARE PLAN
Problem: Bowel/Gastric:  Goal: Will not experience complications related to bowel motility    Intervention: Implement interventions to promote bowel evacuation if inadequate bowel movements in past 48 hours  Had a very large bowel incontinence in his pants/underwear. He did receive scheduled pericolace this am for no BM x 3 days but no prn meds given.

## 2019-01-06 PROCEDURE — 700111 HCHG RX REV CODE 636 W/ 250 OVERRIDE (IP): Performed by: PHYSICAL MEDICINE & REHABILITATION

## 2019-01-06 PROCEDURE — A9270 NON-COVERED ITEM OR SERVICE: HCPCS | Performed by: PHYSICAL MEDICINE & REHABILITATION

## 2019-01-06 PROCEDURE — 700102 HCHG RX REV CODE 250 W/ 637 OVERRIDE(OP): Performed by: PHYSICAL MEDICINE & REHABILITATION

## 2019-01-06 PROCEDURE — 770010 HCHG ROOM/CARE - REHAB SEMI PRIVAT*

## 2019-01-06 PROCEDURE — G0515 COGNITIVE SKILLS DEVELOPMENT: HCPCS

## 2019-01-06 RX ORDER — BISACODYL 10 MG
10 SUPPOSITORY, RECTAL RECTAL
Status: DISCONTINUED | OUTPATIENT
Start: 2019-01-06 | End: 2019-01-12 | Stop reason: HOSPADM

## 2019-01-06 RX ORDER — POLYETHYLENE GLYCOL 3350 17 G/17G
1 POWDER, FOR SOLUTION ORAL
Status: DISCONTINUED | OUTPATIENT
Start: 2019-01-06 | End: 2019-01-12 | Stop reason: HOSPADM

## 2019-01-06 RX ORDER — AMOXICILLIN 250 MG
2 CAPSULE ORAL
Status: DISCONTINUED | OUTPATIENT
Start: 2019-01-06 | End: 2019-01-12 | Stop reason: HOSPADM

## 2019-01-06 RX ADMIN — DIVALPROEX SODIUM 500 MG: 500 TABLET, DELAYED RELEASE ORAL at 08:19

## 2019-01-06 RX ADMIN — ATORVASTATIN CALCIUM 80 MG: 40 TABLET, FILM COATED ORAL at 20:19

## 2019-01-06 RX ADMIN — MONTELUKAST SODIUM 10 MG: 10 TABLET, FILM COATED ORAL at 20:20

## 2019-01-06 RX ADMIN — PRAZOSIN HYDROCHLORIDE 1 MG: 1 CAPSULE ORAL at 20:20

## 2019-01-06 RX ADMIN — NYSTATIN: 100000 CREAM TOPICAL at 20:21

## 2019-01-06 RX ADMIN — LEVOTHYROXINE SODIUM 112 MCG: 112 TABLET ORAL at 05:05

## 2019-01-06 RX ADMIN — BUSPIRONE HYDROCHLORIDE 15 MG: 15 TABLET ORAL at 15:28

## 2019-01-06 RX ADMIN — ENOXAPARIN SODIUM 40 MG: 100 INJECTION SUBCUTANEOUS at 08:19

## 2019-01-06 RX ADMIN — BUSPIRONE HYDROCHLORIDE 15 MG: 15 TABLET ORAL at 08:19

## 2019-01-06 RX ADMIN — DIVALPROEX SODIUM 1500 MG: 500 TABLET, DELAYED RELEASE ORAL at 20:19

## 2019-01-06 RX ADMIN — SERTRALINE HYDROCHLORIDE 100 MG: 50 TABLET ORAL at 08:20

## 2019-01-06 RX ADMIN — VITAMIN D, TAB 1000IU (100/BT) 1000 UNITS: 25 TAB at 08:20

## 2019-01-06 RX ADMIN — LEVOTHYROXINE SODIUM 25 MCG: 25 TABLET ORAL at 05:05

## 2019-01-06 RX ADMIN — BUSPIRONE HYDROCHLORIDE 15 MG: 15 TABLET ORAL at 20:20

## 2019-01-06 RX ADMIN — STANDARDIZED SENNA CONCENTRATE AND DOCUSATE SODIUM 2 TABLET: 8.6; 5 TABLET, FILM COATED ORAL at 08:20

## 2019-01-06 RX ADMIN — NYSTATIN 1 G: 100000 CREAM TOPICAL at 08:21

## 2019-01-06 ASSESSMENT — PAIN SCALES - GENERAL
PAINLEVEL_OUTOF10: 3
PAINLEVEL_OUTOF10: 0

## 2019-01-06 NOTE — CARE PLAN
Problem: Safety  Goal: Will remain free from injury  Call light with in reach. Redirection to use call light for assistance. Non skid socks. Upper siderails up x2 while in bed. No complains of pain. HS snacks given as per patient's request. No respiratory distress. Able to make needs known. Assisted as needed. Will continue to monitor.

## 2019-01-07 PROCEDURE — 700111 HCHG RX REV CODE 636 W/ 250 OVERRIDE (IP): Performed by: PHYSICAL MEDICINE & REHABILITATION

## 2019-01-07 PROCEDURE — 99232 SBSQ HOSP IP/OBS MODERATE 35: CPT | Performed by: PHYSICAL MEDICINE & REHABILITATION

## 2019-01-07 PROCEDURE — 97530 THERAPEUTIC ACTIVITIES: CPT

## 2019-01-07 PROCEDURE — 97110 THERAPEUTIC EXERCISES: CPT

## 2019-01-07 PROCEDURE — 700102 HCHG RX REV CODE 250 W/ 637 OVERRIDE(OP): Performed by: PHYSICAL MEDICINE & REHABILITATION

## 2019-01-07 PROCEDURE — A9270 NON-COVERED ITEM OR SERVICE: HCPCS | Performed by: PHYSICAL MEDICINE & REHABILITATION

## 2019-01-07 PROCEDURE — 97112 NEUROMUSCULAR REEDUCATION: CPT

## 2019-01-07 PROCEDURE — G0515 COGNITIVE SKILLS DEVELOPMENT: HCPCS

## 2019-01-07 PROCEDURE — 97116 GAIT TRAINING THERAPY: CPT

## 2019-01-07 PROCEDURE — 770010 HCHG ROOM/CARE - REHAB SEMI PRIVAT*

## 2019-01-07 RX ADMIN — LEVOTHYROXINE SODIUM 25 MCG: 25 TABLET ORAL at 05:32

## 2019-01-07 RX ADMIN — DIVALPROEX SODIUM 500 MG: 500 TABLET, DELAYED RELEASE ORAL at 08:53

## 2019-01-07 RX ADMIN — BUSPIRONE HYDROCHLORIDE 15 MG: 15 TABLET ORAL at 19:51

## 2019-01-07 RX ADMIN — VITAMIN D, TAB 1000IU (100/BT) 1000 UNITS: 25 TAB at 08:52

## 2019-01-07 RX ADMIN — DIVALPROEX SODIUM 1500 MG: 500 TABLET, DELAYED RELEASE ORAL at 19:51

## 2019-01-07 RX ADMIN — ENOXAPARIN SODIUM 40 MG: 100 INJECTION SUBCUTANEOUS at 08:53

## 2019-01-07 RX ADMIN — NYSTATIN: 100000 CREAM TOPICAL at 19:53

## 2019-01-07 RX ADMIN — SERTRALINE HYDROCHLORIDE 100 MG: 50 TABLET ORAL at 08:52

## 2019-01-07 RX ADMIN — BUSPIRONE HYDROCHLORIDE 15 MG: 15 TABLET ORAL at 08:52

## 2019-01-07 RX ADMIN — NYSTATIN: 100000 CREAM TOPICAL at 08:54

## 2019-01-07 RX ADMIN — MONTELUKAST SODIUM 10 MG: 10 TABLET, FILM COATED ORAL at 19:51

## 2019-01-07 RX ADMIN — PRAZOSIN HYDROCHLORIDE 1 MG: 1 CAPSULE ORAL at 19:51

## 2019-01-07 RX ADMIN — BUSPIRONE HYDROCHLORIDE 15 MG: 15 TABLET ORAL at 14:19

## 2019-01-07 RX ADMIN — LEVOTHYROXINE SODIUM 112 MCG: 112 TABLET ORAL at 05:32

## 2019-01-07 RX ADMIN — ATORVASTATIN CALCIUM 80 MG: 40 TABLET, FILM COATED ORAL at 19:51

## 2019-01-07 ASSESSMENT — PAIN SCALES - GENERAL: PAINLEVEL_OUTOF10: 0

## 2019-01-07 NOTE — CARE PLAN
"Problem: Safety  Goal: Will remain free from falls  Outcome: PROGRESSING AS EXPECTED  Does not attempt self transfer, uses call light appropriately and waits for staff assist. Hourly rounding for safety checks and personal needs. Remains free from falls.    Problem: Bowel/Gastric:  Goal: Normal bowel function is maintained or improved  Outcome: PROGRESSING AS EXPECTED  One accident of stool and one continent accident of stool today. Bowel meds changed to prn.    Problem: Pain Management  Goal: Pain level will decrease to patient's comfort goal  Outcome: PROGRESSING AS EXPECTED  Pt reports 3/10 headache this morning but declines need for analgesic stating, \"sometimes they go away on their own.\" no analgesics administered. No further c/o of pain.       "

## 2019-01-07 NOTE — CARE PLAN
Problem: Skin Integrity  Goal: Risk for impaired skin integrity will decrease    Intervention: Assess risk factors for impaired skin integrity and/or pressure ulcers  Antifungal placed on patient abdomen and left arm per order, area is red/rash. Will continue to monitor and assess.      Problem: Pain Management  Goal: Pain level will decrease to patient's comfort goal    Intervention: Follow pain managment plan developed in collaboration with patient and Interdisciplinary Team  Pt able to participate in therapies and activities this shift.

## 2019-01-07 NOTE — PROGRESS NOTES
"Rehab Progress Note     Encounter Date: 1/7/2019    CC: Conversion disorder; right sided weakness    Interval Events (Subjective)  Patient sitting up in room. He reports he is slowly improving with his function. Per documentation, patient using call light appropriately and waiting for assistance. He reports no pain. He denies NVD.     Objective:  VITAL SIGNS: /48   Pulse (!) 54   Temp 36.5 °C (97.7 °F) (Oral)   Resp 16   Ht 1.981 m (6' 6\")   Wt (!) 130.5 kg (287 lb 11.2 oz)   SpO2 95%   BMI 33.25 kg/m²   Gen: NAD  Psych: Mood and affect appropriate  CV: RRR, no edema  Resp: CTAB, no upper airway sounds  Abd: NTND  Neuro: AOx4, following simple commands    No results found for this or any previous visit (from the past 72 hour(s)).    Current Facility-Administered Medications   Medication Frequency   • senna-docusate (PERICOLACE or SENOKOT S) 8.6-50 MG per tablet 2 Tab QDAY PRN    And   • polyethylene glycol/lytes (MIRALAX) PACKET 1 Packet QDAY PRN    And   • magnesium hydroxide (MILK OF MAGNESIA) suspension 30 mL QDAY PRN    And   • bisacodyl (DULCOLAX) suppository 10 mg QDAY PRN   • vitamin D (cholecalciferol) tablet 1,000 Units DAILY   • Respiratory Care per Protocol Continuous RT   • Pharmacy Consult Request ...Pain Management Review 1 Each PRN   • hydrALAZINE (APRESOLINE) tablet 25 mg Q8HRS PRN   • acetaminophen (TYLENOL) tablet 650 mg Q4HRS PRN   • artificial tears 1.4 % ophthalmic solution 1 Drop PRN   • benzocaine-menthol (CEPACOL) lozenge 1 Lozenge Q2HRS PRN   • mag hydrox-al hydrox-simeth (MAALOX PLUS ES or MYLANTA DS) suspension 20 mL Q2HRS PRN   • ondansetron (ZOFRAN ODT) dispertab 4 mg 4X/DAY PRN    Or   • ondansetron (ZOFRAN) syringe/vial injection 4 mg 4X/DAY PRN   • traZODone (DESYREL) tablet 50 mg QHS PRN   • sodium chloride (OCEAN) 0.65 % nasal spray 2 Spray PRN   • enoxaparin (LOVENOX) inj 40 mg DAILY   • atorvastatin (LIPITOR) tablet 80 mg Nightly   • busPIRone (BUSPAR) tablet 15 mg " TID   • divalproex (DEPAKOTE) delayed-release tablet 1,500 mg Q EVENING   • divalproex (DEPAKOTE) delayed-release tablet 500 mg DAILY   • montelukast (SINGULAIR) tablet 10 mg Nightly   • nystatin (MYCOSTATIN) cream BID   • prazosin (MINIPRESS) capsule 1 mg Nightly   • sertraline (ZOLOFT) tablet 100 mg DAILY   • levothyroxine (SYNTHROID) tablet 112 mcg AM ES    And   • levothyroxine (SYNTHROID) tablet 25 mcg AM ES   • albuterol inhaler 2 Puff Q4HRS PRN       Orders Placed This Encounter   Procedures   • Diet Order Regular     Standing Status:   Standing     Number of Occurrences:   1     Order Specific Question:   Diet:     Answer:   Regular [1]     Order Specific Question:   Consistency/Fluid modifications:     Answer:   Thin Liquids [3]     Comments:   chopped meats       Assessment:  Active Hospital Problems    Diagnosis   • *Conversion disorder   • Right sided weakness   • Bipolar disorder (HCC)   • Acquired hypothyroidism   • Depression   • HLD (hyperlipidemia)   • Seizure (HCC)   • Asthma       Medical Decision Making and Plan:  Conversion Disorder vs Santosh’s Paralysis - Patient with weakness on right side after waking with history of multiple seizures. Depakote level was low on admission and EEG showed bilateral temporal cortical dysfunction.  Patient has had multiple admission for similar complaint and no neurologic symptoms other than weakness.  Neurology and psychiatry have followed him in the past. May be a component of malingering as multiple admissions for similar symptoms at relatively regular occurences  -Less likely stroke, will discontinue ASA  -PT and OT for mobility and ADLs. Most likely will improve with positive encouragement     Seizure disorder - Patient with history of TBI with post-traumatic seizures.  Patient on Depakote 500/1500 mg on transfer.  EEG with bilateral temporal cortical dysfunction.   -Continue Depakote 500 / 1500. Will check level tomorrow AM     BPD1/Depression - Patient on  Buspar 15 mg TID and Sertaline on transfer in addition to Depakote.  Unclear if Prazosin is for PTSD as listed as current diagnosis.      Asthma - On Singulair nightly at home.     HLD - Cholesterol 209, Triglyceride 416 and HDL 32 on 1/1/19. Started on Atorvastatin 80 mg QHS     Obesity - Patient with BMI of 33 on admission. Will consult dietitian      Anemia - 13.6 on admission up from 13.2 on 1/1/19    DM - History of DM, A1c 6.6 on admission. He reports being diet controlled. Will continue discussion    Hypothyroidism - Patient on 137 mcg daily     Tobacco use - Patient will need counseling prior to discharge.     Vitamin D - 23 on admission, started on 1000 U supplement    DVT Ppx - Patient on Lovenox on transfer.     Total time:  25 minutes.  I spent greater than 50% of the time for patient care, counseling, and coordination on this date, including unit/floor time, and face-to-face time with the patient as per interval events and assessment and plan above. Topics discussed included slow improvements, incontinence this weekend, off bowel medications and check depakote level.     Karan Smith M.D.

## 2019-01-07 NOTE — CARE PLAN
Problem: Safety  Goal: Will remain free from injury  Call light with in reach. Redirection to use call light for assistance. Non skid socks. Upper siderails up x2 while in bed. HS snacks given. No complains of pain. No respiratory distress. Able to make needs known. Assisted as needed. Continues Nystatin cream to LUE and abdomen rashes. Will continue to monitor.

## 2019-01-07 NOTE — PROGRESS NOTES
Patient is Alert, uses the call light when assistance is needed and has not attempted to self-transfer so far this shift. Patient denies complaints of pain and has been up participating in therapies.

## 2019-01-08 LAB — VALPROATE SERPL-MCNC: 86.3 UG/ML (ref 50–100)

## 2019-01-08 PROCEDURE — 99233 SBSQ HOSP IP/OBS HIGH 50: CPT | Mod: 25 | Performed by: PHYSICAL MEDICINE & REHABILITATION

## 2019-01-08 PROCEDURE — 97535 SELF CARE MNGMENT TRAINING: CPT

## 2019-01-08 PROCEDURE — 770010 HCHG ROOM/CARE - REHAB SEMI PRIVAT*

## 2019-01-08 PROCEDURE — 97116 GAIT TRAINING THERAPY: CPT

## 2019-01-08 PROCEDURE — G0515 COGNITIVE SKILLS DEVELOPMENT: HCPCS

## 2019-01-08 PROCEDURE — 700102 HCHG RX REV CODE 250 W/ 637 OVERRIDE(OP): Performed by: PHYSICAL MEDICINE & REHABILITATION

## 2019-01-08 PROCEDURE — 36415 COLL VENOUS BLD VENIPUNCTURE: CPT

## 2019-01-08 PROCEDURE — A9270 NON-COVERED ITEM OR SERVICE: HCPCS | Performed by: PHYSICAL MEDICINE & REHABILITATION

## 2019-01-08 PROCEDURE — 97530 THERAPEUTIC ACTIVITIES: CPT

## 2019-01-08 PROCEDURE — 80164 ASSAY DIPROPYLACETIC ACD TOT: CPT

## 2019-01-08 PROCEDURE — 700111 HCHG RX REV CODE 636 W/ 250 OVERRIDE (IP): Performed by: PHYSICAL MEDICINE & REHABILITATION

## 2019-01-08 PROCEDURE — 99406 BEHAV CHNG SMOKING 3-10 MIN: CPT | Performed by: PHYSICAL MEDICINE & REHABILITATION

## 2019-01-08 RX ADMIN — MONTELUKAST SODIUM 10 MG: 10 TABLET, FILM COATED ORAL at 20:20

## 2019-01-08 RX ADMIN — BUSPIRONE HYDROCHLORIDE 15 MG: 15 TABLET ORAL at 20:21

## 2019-01-08 RX ADMIN — ATORVASTATIN CALCIUM 80 MG: 40 TABLET, FILM COATED ORAL at 20:21

## 2019-01-08 RX ADMIN — BUSPIRONE HYDROCHLORIDE 15 MG: 15 TABLET ORAL at 08:31

## 2019-01-08 RX ADMIN — SERTRALINE HYDROCHLORIDE 100 MG: 50 TABLET ORAL at 08:31

## 2019-01-08 RX ADMIN — ENOXAPARIN SODIUM 40 MG: 100 INJECTION SUBCUTANEOUS at 08:32

## 2019-01-08 RX ADMIN — NYSTATIN: 100000 CREAM TOPICAL at 20:22

## 2019-01-08 RX ADMIN — BUSPIRONE HYDROCHLORIDE 15 MG: 15 TABLET ORAL at 14:37

## 2019-01-08 RX ADMIN — PRAZOSIN HYDROCHLORIDE 1 MG: 1 CAPSULE ORAL at 20:42

## 2019-01-08 RX ADMIN — VITAMIN D, TAB 1000IU (100/BT) 1000 UNITS: 25 TAB at 08:31

## 2019-01-08 RX ADMIN — LEVOTHYROXINE SODIUM 25 MCG: 25 TABLET ORAL at 05:14

## 2019-01-08 RX ADMIN — NYSTATIN: 100000 CREAM TOPICAL at 08:31

## 2019-01-08 RX ADMIN — LEVOTHYROXINE SODIUM 112 MCG: 112 TABLET ORAL at 05:14

## 2019-01-08 RX ADMIN — DIVALPROEX SODIUM 1500 MG: 500 TABLET, DELAYED RELEASE ORAL at 20:20

## 2019-01-08 RX ADMIN — DIVALPROEX SODIUM 500 MG: 500 TABLET, DELAYED RELEASE ORAL at 08:31

## 2019-01-08 ASSESSMENT — GAIT ASSESSMENTS
DISTANCE (FEET): 20
ASSISTIVE DEVICE: FRONT WHEEL WALKER
GAIT LEVEL OF ASSIST: MINIMAL ASSIST
DEVIATION: STEP TO;BRADYKINETIC;DECREASED HEEL STRIKE;DECREASED TOE OFF

## 2019-01-08 ASSESSMENT — PAIN SCALES - GENERAL: PAINLEVEL_OUTOF10: 0

## 2019-01-08 NOTE — CARE PLAN
Problem: Recreation Therapy  Goal: STG-Within one week, patient will  Participate in a leisure activity using his right UE at least 30% of the time with the other 70% using his left.     Outcome: MET Date Met: 01/08/19  He was able to use his right hand using the Wii remote 90% of the time during the one time he was seen this week so far. His  strength was strong as he was swinging his arm quickly and was able to hold on to the remote.   Goal: LTG-By discharge, patient will  Identify at least two new leisure activities he would engage in and barriers to engaging in them directly with recreation therapist.    Outcome: PROGRESSING AS EXPECTED  He has yet to report new activities and barriers to those activities with this writer.

## 2019-01-08 NOTE — PROGRESS NOTES
Patient is AOx4 has good safety awareness and uses the call light when assistance is needed. Patient denies complaints of pain and has been up participating in therapies.

## 2019-01-08 NOTE — REHAB-NURSING IDT TEAM NOTE
Nursing   Nursing  Diet/Nutrition:  Regular and Thin Liquids  Medication Administration:  Whole with Liquid Wash  % consumed at meals in last 24 hours:  Consumed % of meals per documentation.  Meal/Snack Consumption for the past 24 hrs:   Oral Nutrition   01/07/19 1200 Lunch;Between % Consumed   01/07/19 0800 Breakfast;Between % Consumed       Snack schedule:  HS  Appetite:  Excellent  Fluid Intake/Output in past 24 hours: In: 1560 [P.O.:1560]  Out: 1850   Admit Weight:  Weight: (!) 130.5 kg (287 lb 11.2 oz)  Weight Last 7 Days   [130.5 kg (287 lb 11.2 oz)] 130.5 kg (287 lb 11.2 oz) (01/06 0500)    Pain Issues:    Location:  denies         Severity:  Denies   Scheduled pain medications:  None     PRN pain medications used in last 24 hours:  None   Non Pharmacologic Interventions:  distraction and emotional support  Effectiveness of pain management plan:  good=patient states acceptable comfort after interventions    Bowel:    Bowel Assist Initial Score:  1 - Total Assistance  Bowel Assist Current Score:  1 - Total Assistance  Bowl Accidents in last 7 days:  1  Last bowel movement: 01/06/19  Stool Description: Large, Loose     Usual bowel pattern:  daily  Scheduled bowel medications:  None  PRN bowel medications used in last 24 hours:  None  Nursing Interventions:  Increased time, Verbal cueing, Positioning on commode/toilet, Supervision  Effectiveness of bowel program:   fair=occasional unpredictable, incontinent emptying of bowel (less than 1 time day)  Bladder:    Bladder Assist Initial Score:  1 - Total Assistance  Bladder Assist Current Score:  4 - Minimal Assistance  Bladder Accidents in last 7 days: 0    Medications affecting bladder:  None    Time void schedule/voiding pattern:  Pt initiates voiding  Interventions:  Increased time, Emptying of device  Effectiveness of bladder training:  continent  Sleep/wake cycle:   Average hours slept:  Sleeps 3-4 hours without waking  Sleep medication  usage:  None    Patient/Family Training/Education:  Bladder Management/Training, Bowel Management/Training, Fall Prevention and General Self Care  Strengths: Alert and oriented, Willingly participates in therapeutic activities, Pleasant and cooperative and Motivated for self care and independence   Barriers:   Generalized weakness , occasional bowel incontinency           Nursing Problems           Problem: Bowel/Gastric:     Goal: Normal bowel function is maintained or improved           Goal: Will not experience complications related to bowel motility             Problem: Communication     Goal: The ability to communicate needs accurately and effectively will improve             Problem: Discharge Barriers/Planning     Goal: Patient's continuum of care needs will be met             Problem: Infection     Goal: Will remain free from infection             Problem: Knowledge Deficit     Goal: Knowledge of disease process/condition, treatment plan, diagnostic tests, and medications will improve           Goal: Knowledge of the prescribed therapeutic regimen will improve             Problem: Mobility     Goal: Risk for activity intolerance will decrease             Problem: Pain Management     Goal: Pain level will decrease to patient's comfort goal     Flowsheet:     Taken at 01/05/19 0012    Pain Scale 0 - 10  0 by René Morin R.N.    Non Verbal Scale  Calm by René Morin R.N.    Comfort Goal Comfort at Rest by René Morin R.N.                  Problem: Respiratory:     Goal: Respiratory status will improve             Problem: Safety     Goal: Will remain free from injury           Goal: Will remain free from falls             Problem: Skin Integrity     Goal: Risk for impaired skin integrity will decrease             Problem: Venous Thromboembolism (VTW)/Deep Vein Thrombosis (DVT) Prevention:     Goal: Patient will participate in Venous Thrombosis (VTE)/Deep Vein Thrombosis (DVT)Prevention  Measures                  Long Term Goals:   At discharge patient will be able to function safely at home and in the community with support.    Section completed by:  Brooks Sauer R.N.  Read and reviewed by Edilma Rosario R.N.

## 2019-01-08 NOTE — CARE PLAN
Problem: Mobility  Goal: STG-Within one week, patient will propel wheelchair community  1) Individualized goal: W/C mobility x 200 feet with SPV  2) Interventions:  PT Group Therapy, PT E Stim Attended, PT Gait Training, PT Therapeutic Exercises, PT Neuro Re-Ed/Balance, PT Aquatic Therapy, PT Therapeutic Activity, PT Manual Therapy and PT Evaluation     Outcome: MET Date Met: 01/08/19    Goal: STG-Within one week, patient will ambulate household distance  1) Individualized goal:  AMB x 10 feet x 2 in // bars with min A  2) Interventions:  PT Group Therapy, PT E Stim Attended, PT Gait Training, PT Therapeutic Exercises, PT Neuro Re-Ed/Balance, PT Aquatic Therapy, PT Therapeutic Activity, PT Manual Therapy and PT Evaluation     Outcome: MET Date Met: 01/08/19      Problem: Mobility Transfers  Goal: STG-Within one week, patient will transfer bed to chair  1) Individualized goal: Lateral scoot transfer with min A  2) Interventions:  PT Group Therapy, PT E Stim Attended, PT Gait Training, PT Therapeutic Exercises, PT Neuro Re-Ed/Balance, PT Aquatic Therapy, PT Therapeutic Activity, PT Manual Therapy and PT Evaluation   Outcome: MET Date Met: 01/08/19

## 2019-01-08 NOTE — REHAB-ACTIVITY IDT TEAM NOTE
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            Goal: STG-Within one week, patient will     Dates: Start: 01/08/19       Description: Use his right hand  strength in a fine motor activity at least 50% of the time in each session seen.              Met one short term goal. Was able to use his right hand to  Wii remote and swing it very quickly during the Wii bowling activity. He would then move his right hand at times using his left hand in between turns. Reported two different high scores in real bowling two separate days. Will continue to work on his plans for leisure activities upon discharge as well as the use in his right hand while in [place].     Strengths:   Willingness for [place], positive and social  Barriers:   inconsistent reporting, poor endurance     Section completed by:  CARMINA Lunsford

## 2019-01-08 NOTE — CARE PLAN
Problem: Expression STGs  Goal: STG-Within one week, patient will  1) Individualized goal:  State 10 items per minute given concrete category and min verbal cues.   2) Interventions:  SLP Speech Language Treatment     Outcome: MET Date Met: 01/08/19  Average 10 items listed per minute.    Problem: Problem Solving STGs  Goal: STG-Within one week, patient will  1) Individualized goal:  Answer basic problem solving questions related to hospitalization and IADLs with 80% accuracy given min verbal cues.    2) Interventions:  SLP Cognitive Skill Development and SLP Group Treatment     Outcome: MET Date Met: 01/08/19  80% with min cues.    Problem: Memory STGs  Goal: STG-Within one week, patient will  1) Individualized goal:  Recall daily events, with use of memory book, given mod verbal cues.    2) Interventions:  SLP Cognitive Skill Development and SLP Group Treatment     Outcome: MET Date Met: 01/08/19  80% with min cues.

## 2019-01-08 NOTE — CARE PLAN
Problem: Skin Integrity  Goal: Risk for impaired skin integrity will decrease    Intervention: Assess risk factors for impaired skin integrity and/or pressure ulcers  Antifungal applied to abdomen and left arm per order. Patient did shower this am with OT Katiana. Rash shows improvement from yesterday, not as red.

## 2019-01-08 NOTE — REHAB-PHARMACY IDT TEAM NOTE
Pharmacy   Pharmacy  Antibiotics/Antifungals/Antivirals:  Medication:      Active Orders     None        Route:        NA  Stop Date:  NA  Reason:      NA  Antihypertensives/Cardiac:  Medication:    Orders (72h ago through future)    Start     Ordered    01/03/19 2100  atorvastatin (LIPITOR) tablet 80 mg  NIGHTLY      01/03/19 1412    01/03/19 2100  prazosin (MINIPRESS) capsule 1 mg  NIGHTLY      01/03/19 1412    01/03/19 1412  hydrALAZINE (APRESOLINE) tablet 25 mg  EVERY 8 HOURS PRN      01/03/19 1412        Patient Vitals for the past 24 hrs:   BP Pulse   01/07/19 1400 126/68 74   01/07/19 0700 102/48 (!) 54   01/06/19 2020 118/62 (!) 55   01/06/19 1835 100/65 (!) 56       Anticoagulation:  Medication: Lovenox    Other key medications: A review of the medication list reveals no issues at this time.    Section completed by: Wilber Arellano [facility]

## 2019-01-08 NOTE — REHAB-OT IDT TEAM NOTE
Occupational Therapy   Activities of Daily Living  Eating Initial:  5 - Standby Prompting/Supervision or Set-up  Eating Current:  6 - Modified Independent   Eating Description:  Modified diet, Increased time, Adaptive equipment, Supervision for safety  Grooming Initial:  4 - Minimal Assistance  Grooming Current:  6 - Modified Independent   Grooming Description:   (setup for oral care  wash / dry face and brush hair.   patient used left hand to complete tasks )  Bathing Initial:  3 - Moderate Assistance  Bathing Current:  6 - Modified Independent   Bathing Description:     Upper Body Dressing Initial:  4 - Minimal Assistance  Upper Body Dressing Current:  6 - Modified Independent   Upper Body Dressing Description:     Lower Body Dressing Initial:  3 - Moderate Assistance  Lower Body Dressing Current:  6 - Modified Independent   Lower Body Dressing Description:  6 - Modified Independent  Toileting Initial:  1 - Total Assistance  Toileting Current:  5 - Standby Prompting/Supervision or Set-up   Toileting Description:  Grab bar, Supervision for safety  Toilet Transfer Initial:  2 - Max Assistance  Toilet Transfer Current:  5 - Standby Prompting/Supervision or Set-up   Toilet Transfer Description:  5 - Standby Prompting/Supervision or Set-up  Tub / Shower Transfer Initial:  1 - Total Assistance  Tub / Shower Transfer Current:  5 - Standby Prompting/Supervision or Set-up   Tub / Shower Transfer Description:  Stephanieb bar, Supervision for safety  IADL:  Pt does not complete at baseline as he has support form group home.   Family Training/Education:  No family present    DME/DC Recommendations:  Shower chair, GB toilet and shower     Strengths:  Able to follow instructions, Adequate strength, Alert and oriented, Effective communication skills, Good balance, Good carryover of learning, Good endurance, Independent PLOF, Making steady progress towards goals, Manages pain appropriately, Motivated for self care and independence,  Pleasant and cooperative and Willingly participates in therapeutic activities  Barriers:  Other: Pt variable volitional use of RUE, yet frequently demsontrates WFL for ADLs and functional transfers     # of short term goals set= 3    # of short term goals met= 3          Occupational Therapy Goals           Problem: OT Long Term Goals     Dates: Start: 01/08/19       Goal: LTG-By discharge, patient will complete basic self care tasks     Dates: Start: 01/04/19       Description: 1) Individualized Goal:  SPV, DME and AE PRN  2) Interventions:  OT Orthotics Training, OT E Stim Attended, OT Group Therapy, OT Self Care/ADL, OT Cognitive Skill Dev, OT Community Reintegration, OT Manual Ther Technique, OT Neuro Re-Ed/Balance, OT Sensory Int Techniques, OT Therapeutic Activity, OT Aquatic Therapy, OT Evaluation and OT Therapeutic Exercise             Goal: LTG-By discharge, patient will perform bathroom transfers     Dates: Start: 01/04/19       Description: 1) Individualized Goal:  SPV, DME and AE PRN  2) Interventions:  OT Orthotics Training, OT E Stim Attended, OT Group Therapy, OT Self Care/ADL, OT Cognitive Skill Dev, OT Community Reintegration, OT Manual Ther Technique, OT Neuro Re-Ed/Balance, OT Sensory Int Techniques, OT Therapeutic Activity, OT Aquatic Therapy, OT Evaluation and OT Therapeutic Exercise                   Section completed by:  Katiana Reese, OT

## 2019-01-08 NOTE — REHAB-COLLABORATIVE ONGOING IDT TEAM NOTE
Weekly Interdisciplinary Team Conference Note    Weekly Interdisciplinary Team Conference # 1  Date:  1/8/2019    Clinicians present and reporting at team conference include the following:   MD: Karan Smith MD   RN:  Edilma Rosario, OMID   PT:   Brook Tee, PT, DPT  OT:  Katiana Reese, MS, OTR/L   ST:  Lulu Leija, MS, CCC-SLP  CM:  Paty Hines, OMID, CCM  REC:  Not Applicable  RT:  Not Applicable  RPh:  Emily Calix [facility]  All reporting clinicians have a working knowledge of this patient's plan of care.    Targeted DC Date:  1.11.19     Medical    Patient Active Problem List    Diagnosis Date Noted   • Intractable abdominal pain 07/28/2018     Priority: High   • Acute right-sided weakness 04/17/2014     Priority: High   • Right sided weakness 08/28/2013     Priority: High   • Conversion disorder 03/15/2013     Priority: High   • Urticaria of entire body 08/19/2018     Priority: Medium   • Leukocytosis, stress-, and streoid-related 08/19/2018     Priority: Medium   • Class 1 obesity due to excess calories with serious comorbidity and body mass index (BMI) of 31.0 to 31.9 in adult 07/28/2018     Priority: Low   • Depression 03/06/2016     Priority: Low   • Bipolar disorder (Formerly [facility]) 12/02/2014     Priority: Low   • Psychiatric problem      Priority: Low   • Glaucoma 08/15/2011     Priority: Low   • Acquired hypothyroidism 08/15/2011     Priority: Low   • Depression 08/15/2011     Priority: Low   • Diabetes mellitus type 2 in obese (Formerly [facility]) 01/03/2019   • HLD (hyperlipidemia) 08/19/2018   • Pre-ulcerative corn or callous 02/04/2018   • Type 2 diabetes mellitus without complication, without long-term current use of insulin (Formerly [facility]) 02/02/2018   • Right hemiparesis (Former[facility]) 09/12/2017   • Hyperglycemia 09/12/2017   • PTSD (post-traumatic stress disorder) 03/06/2016   • Pansinusitis 05/27/2015   • Change in mental status 05/26/2015   • Seizure (Formerly [facility]) 12/01/2014   • Weakness 12/01/2014   • Patient non adherence  10/16/2014   • Headache 08/06/2014   • Syncope 07/27/2014   • Anemia 05/03/2013   • Thyroid mass of unclear etiology 12/06/2012   • Paralysis on one side of body (HCC) 08/05/2012   • Asthma 08/15/2011   • Bradycardia 08/15/2011   • Hemiparesis (HCC) 08/15/2011     Results     Procedure Component Value Ref Range Date/Time    VALPROIC ACID [[id number]] Collected:  01/08/19 0511    Order Status:  Completed Lab Status:  Final result Updated:  01/08/19 0648    Specimen:  Blood      Valproic Acid 86.3 50.0 - 100.0 ug/mL            Nursing  Diet/Nutrition:  Regular and Thin Liquids  Medication Administration:  Whole with Liquid Wash  % consumed at meals in last 24 hours:  Consumed % of meals per documentation.  Meal/Snack Consumption for the past 24 hrs:   Oral Nutrition   01/07/19 1200 Lunch;Between % Consumed   01/07/19 0800 Breakfast;Between % Consumed       Snack schedule:  HS  Appetite:  Excellent  Fluid Intake/Output in past 24 hours: In: 1560 [P.O.:1560]  Out: 1850   Admit Weight:  Weight: (!) 130.5 kg (287 lb 11.2 oz)  Weight Last 7 Days   [130.5 kg (287 lb 11.2 oz)] 130.5 kg (287 lb 11.2 oz) (01/06 0500)    Pain Issues:    Location:  denies         Severity:  Denies   Scheduled pain medications:  None     PRN pain medications used in last 24 hours:  None   Non Pharmacologic Interventions:  distraction and emotional support  Effectiveness of pain management plan:  good=patient states acceptable comfort after interventions    Bowel:    Bowel Assist Initial Score:  1 - Total Assistance  Bowel Assist Current Score:  1 - Total Assistance  Bowl Accidents in last 7 days:  1  Last bowel movement: 01/06/19  Stool Description: Large, Loose     Usual bowel pattern:  daily  Scheduled bowel medications:  None  PRN bowel medications used in last 24 hours:  None  Nursing Interventions:  Increased time, Verbal cueing, Positioning on commode/toilet, Supervision  Effectiveness of bowel program:   fair=occasional  unpredictable, incontinent emptying of bowel (less than 1 time day)  Bladder:    Bladder Assist Initial Score:  1 - Total Assistance  Bladder Assist Current Score:  4 - Minimal Assistance  Bladder Accidents in last 7 days: 0    Medications affecting bladder:  None    Time void schedule/voiding pattern:  Pt initiates voiding  Interventions:  Increased time, Emptying of device  Effectiveness of bladder training:  continent  Sleep/wake cycle:   Average hours slept:  Sleeps 3-4 hours without waking  Sleep medication usage:  None    Patient/Family Training/Education:  Bladder Management/Training, Bowel Management/Training, Fall Prevention and General Self Care  Strengths: Alert and oriented, Willingly participates in therapeutic activities, Pleasant and cooperative and Motivated for self care and independence   Barriers:   Generalized weakness , occasional bowel incontinency           Nursing Problems           Problem: Bowel/Gastric:     Goal: Normal bowel function is maintained or improved           Goal: Will not experience complications related to bowel motility             Problem: Communication     Goal: The ability to communicate needs accurately and effectively will improve             Problem: Discharge Barriers/Planning     Goal: Patient's continuum of care needs will be met             Problem: Infection     Goal: Will remain free from infection             Problem: Knowledge Deficit     Goal: Knowledge of disease process/condition, treatment plan, diagnostic tests, and medications will improve           Goal: Knowledge of the prescribed therapeutic regimen will improve             Problem: Mobility     Goal: Risk for activity intolerance will decrease             Problem: Pain Management     Goal: Pain level will decrease to patient's comfort goal     Flowsheet:     Taken at 01/05/19 0012    Pain Scale 0 - 10  0 by René Morin RDANIEL    Non Verbal Scale  Calm by René Morin RDANIEL    Comfort Goal  Comfort at Rest by René Morin R.N.                  Problem: Respiratory:     Goal: Respiratory status will improve             Problem: Safety     Goal: Will remain free from injury           Goal: Will remain free from falls             Problem: Skin Integrity     Goal: Risk for impaired skin integrity will decrease             Problem: Venous Thromboembolism (VTW)/Deep Vein Thrombosis (DVT) Prevention:     Goal: Patient will participate in Venous Thrombosis (VTE)/Deep Vein Thrombosis (DVT)Prevention Measures                  Long Term Goals:   At discharge patient will be able to function safely at home and in the community with support.    Section completed by:  Brooks Sauer R.N.  Read and reviewed by Edilma Rosario R.N.            Mobility  Bed mobility:  SBA/SPV  Bed /Chair/Wheelchair Transfer Initial:  2 - Max Assistance  Bed /Chair/Wheelchair Transfer Current:  5 - Standby Prompting/Supervision or Set-up   Bed/Chair/Wheelchair Transfer Description:   (Reach pivot right/ left wc <> bed )  Walk Initial:  1 - Total Assistance  Walk Current:  1 - Total Assistance   Walk Description:  Walker, Safety concerns (Min A FWW 20 ft in SoloStep harness)  Wheelchair Initial:  1 - Total Assistance  Wheelchair Current:  5 - Standby Prompting/Supervision or Set-up   Wheelchair Description:  Supervision for safety ( ft + 200 ft indoors BUE)  Stairs Initial:  0 - Not tested,unsafe activity  Stairs Current: 0 - Not tested,unsafe activity   Stairs Description:    Patient/Family Training/Education: Patient has been educated on safety with mobility and POC  DME/DC Recommendations: Home health PT, anticipate FWW  Strengths:  Independent PLOF, Motivated for self care and independence and Willingly participates in therapeutic activities  Barriers:   Decreased endurance, Generalized weakness, Hemiplegia, Limited mobility, Poor activity tolerance and Poor balance  # of short term goals set= 3  # of short term  goals met= 3       Physical Therapy Problems           Problem: Mobility     Dates: Start: 01/04/19       Goal: STG-Within one week, patient will ambulate household distance     Dates: Start: 01/08/19       Description: 1) Individualized goal:  Patient will amb with FWW 50 ft x 2 indoors CGA-SBA  2) Interventions:  PT Group Therapy, PT E Stim Attended, PT Gait Training, PT Therapeutic Exercises, PT Neuro Re-Ed/Balance, PT Aquatic Therapy, PT Therapeutic Activity and PT Manual Therapy               Problem: Mobility Transfers     Dates: Start: 01/04/19       Goal: STG-Within one week, patient will transfer bed to chair     Dates: Start: 01/08/19       Description: 1) Individualized goal:  Patient will transfer mod I wc <> with bed rail; mod I bed mob   2) Interventions:   PT Group Therapy, PT E Stim Attended, PT Gait Training, PT Therapeutic Exercises, PT Neuro Re-Ed/Balance, PT Aquatic Therapy, PT Therapeutic Activity and PT Manual Therapy               Problem: PT-Long Term Goals     Dates: Start: 01/04/19       Goal: LTG-By discharge, patient will ambulate     Dates: Start: 01/04/19       Description: 1) Individualized goal:  AMB x 150 feet with appropriate AD and SPV  2) Interventions:  PT Group Therapy, PT E Stim Attended, PT Gait Training, PT Therapeutic Exercises, PT Neuro Re-Ed/Balance, PT Aquatic Therapy, PT Therapeutic Activity, PT Manual Therapy and PT Evaluation             Goal: LTG-By discharge, patient will transfer one surface to another     Dates: Start: 01/04/19       Description: 1) Individualized goal:  SPT with appropriate AD and SPV  2) Interventions:  PT Group Therapy, PT E Stim Attended, PT Gait Training, PT Therapeutic Exercises, PT Neuro Re-Ed/Balance, PT Aquatic Therapy, PT Therapeutic Activity, PT Manual Therapy and PT Evaluation             Goal: LTG-By discharge, patient will transfer in/out of a car     Dates: Start: 01/04/19       Description: 1) Individualized goal:  Car transfer with  appropriate AD and SPV  2) Interventions:  PT Group Therapy, PT E Stim Attended, PT Gait Training, PT Therapeutic Exercises, PT Neuro Re-Ed/Balance, PT Aquatic Therapy, PT Therapeutic Activity, PT Manual Therapy and PT Evaluation                   Section completed by:  Brook Tee PT, DPT       Activities of Daily Living  Eating Initial:  5 - Standby Prompting/Supervision or Set-up  Eating Current:  6 - Modified Independent   Eating Description:  Modified diet, Increased time, Adaptive equipment, Supervision for safety  Grooming Initial:  4 - Minimal Assistance  Grooming Current:  6 - Modified Independent   Grooming Description:   (setup for oral care  wash / dry face and brush hair.   patient used left hand to complete tasks )  Bathing Initial:  3 - Moderate Assistance  Bathing Current:  6 - Modified Independent   Bathing Description:     Upper Body Dressing Initial:  4 - Minimal Assistance  Upper Body Dressing Current:  6 - Modified Independent   Upper Body Dressing Description:     Lower Body Dressing Initial:  3 - Moderate Assistance  Lower Body Dressing Current:  6 - Modified Independent   Lower Body Dressing Description:  6 - Modified Independent  Toileting Initial:  1 - Total Assistance  Toileting Current:  5 - Standby Prompting/Supervision or Set-up   Toileting Description:  Grab bar, Supervision for safety  Toilet Transfer Initial:  2 - Max Assistance  Toilet Transfer Current:  5 - Standby Prompting/Supervision or Set-up   Toilet Transfer Description:  5 - Standby Prompting/Supervision or Set-up  Tub / Shower Transfer Initial:  1 - Total Assistance  Tub / Shower Transfer Current:  5 - Standby Prompting/Supervision or Set-up   Tub / Shower Transfer Description:  Grab bar, Supervision for safety  IADL:  Pt does not complete at baseline as he has support form group home.   Family Training/Education:  No family present    DME/DC Recommendations:  Shower chair, GB toilet and shower     Strengths:  Able to  follow instructions, Adequate strength, Alert and oriented, Effective communication skills, Good balance, Good carryover of learning, Good endurance, Independent PLOF, Making steady progress towards goals, Manages pain appropriately, Motivated for self care and independence, Pleasant and cooperative and Willingly participates in therapeutic activities  Barriers:  Other: Pt variable volitional use of RUE, yet frequently demsontrates WFL for ADLs and functional transfers     # of short term goals set= 3    # of short term goals met= 3          Occupational Therapy Goals           Problem: OT Long Term Goals     Dates: Start: 01/08/19       Goal: LTG-By discharge, patient will complete basic self care tasks     Dates: Start: 01/04/19       Description: 1) Individualized Goal:  SPV, DME and AE PRN  2) Interventions:  OT Orthotics Training, OT E Stim Attended, OT Group Therapy, OT Self Care/ADL, OT Cognitive Skill Dev, OT Community Reintegration, OT Manual Ther Technique, OT Neuro Re-Ed/Balance, OT Sensory Int Techniques, OT Therapeutic Activity, OT Aquatic Therapy, OT Evaluation and OT Therapeutic Exercise             Goal: LTG-By discharge, patient will perform bathroom transfers     Dates: Start: 01/04/19       Description: 1) Individualized Goal:  SPV, DME and AE PRN  2) Interventions:  OT Orthotics Training, OT E Stim Attended, OT Group Therapy, OT Self Care/ADL, OT Cognitive Skill Dev, OT Community Reintegration, OT Manual Ther Technique, OT Neuro Re-Ed/Balance, OT Sensory Int Techniques, OT Therapeutic Activity, OT Aquatic Therapy, OT Evaluation and OT Therapeutic Exercise                   Section completed by:  Katiana Reese, OT       Cognitive Linquistic Functions  Comprehension Initial:  6 - Modified Independent  Comprehension Current:  6 - Modified Independent   Comprehension Description:  Glasses  Expression Initial:  7 - Independent  Expression Current:  7 - Independent   Expression Description:      Social Interaction Initial:  6 - Modified Independent  Social Interaction Current:  6 - Modified Independent   Social Interaction Description:  Medication, Increased time  Problem Solving Initial:  4 - Minimal Assistance  Problem Solving Current:  4 - Minimal Assistance   Problem Solving Description:  Verbal cueing, Therapy schedule, Increased time  Memory Initial:  4 - Minimal Assistance  Memory Current:  5 - Standby Prompting/Supervision or Set-up   Memory Description:  Verbal cueing, Therapy schedule, Bed/chair alarm  Executive Functioning / Organization Initial:  Minimal (4)  Executive Functioning / Organization Current:  Minimal (4)   Executive Functioning Desciption:  Verbal cues to intiate.  Swallowing  Swallowing Status:  This patient does not receive dysphagia intervention.  Orders Placed This Encounter   Procedures   • Diet Order Regular     Standing Status:   Standing     Number of Occurrences:   1     Order Specific Question:   Diet:     Answer:   Regular [1]     Order Specific Question:   Consistency/Fluid modifications:     Answer:   Thin Liquids [3]     Comments:   chopped meats     Behavior Modification Plan  Keep instructions simple/concrete, Give clear feedback, Set clear goals and Analyze tasks (break down into smaller steps)  Assistive Technology  Low tech: Calendar, planner, schedule, alarms/timers, pill organizer, post-it notes, lists  Family Training/Education:  To be scheduled.  DC Recommendations:   To be determined  Strengths:  Able to follow instructions, Alert and oriented, Pleasant and cooperative and Willingly participates in therapeutic activities  Barriers:  Other: impaired self monitoring and self regulation, impaired attention, impaired planning and organization skills, impaired initiation.  # of short term goals set=  3  # of short term goals met= 3       Speech Therapy Problems           Problem: Memory STGs     Dates: Start: 01/04/19       Goal: STG-Within one week, patient  will     Dates: Start: 01/08/19       Description: 1) Individualized goal:  New training related to safety and stroke education with 90% acc with mod I.  2) Interventions:  SLP Speech Language Treatment, SLP Self Care / ADL Training , SLP Cognitive Skill Development and SLP Group Treatment               Problem: Problem Solving STGs     Dates: Start: 01/04/19       Goal: STG-Within one week, patient will     Dates: Start: 01/08/19       Description: 1) Individualized goal:  Perform alternating attention and functional organization tasks with 90% acc.  2) Interventions:  SLP Speech Language Treatment, SLP Self Care / ADL Training , SLP Cognitive Skill Development and SLP Group Treatment               Problem: Speech/Swallowing LTGs     Dates: Start: 01/04/19       Goal: LTG-By discharge, patient will solve basic problems     Dates: Start: 01/04/19       Description: 1) Individualized goal:  [place] for safe discharge to assisted living facility  2) Interventions:  SLP Speech Language Treatment, SLP Cognitive Skill Development and SLP Group Treatment                    Section completed by:  Lulu Leija MS,CCC-SLP       Recreation/Community     Leisure Competence Measure  Leisure Awareness: Independent  Leisure Attitude: Independent  Leisure Skills: Independent  Cultural / Social Behaviors: Independent  Interpersonal Skills:  (poor eye contact would often interupt)  Community Integration Skills:  (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            Goal: STG-Within one week, patient will      Dates: Start: 01/08/19       Description: Use his right hand  strength in a fine motor activity at least 50% of the time in each session seen.              Met one short term goal. Was able to use his right hand to  Wii remote and swing it very quickly during the Wii bowling activity. He would then move his right hand at times using his left hand in between turns. Reported two different high scores in real bowling two separate days. Will continue to work on his plans for leisure activities upon discharge as well as the use in his right hand while in [place].     Strengths:   Willingness for [place], positive and social  Barriers:   inconsistent reporting, poor endurance     Section completed by:  Matthew Bell, [facility]       REHAB-Pharmacy IDT Team Note by Wilber Arellano RPH at 1/7/2019  4:51 PM  Version 1 of 1    Author:  Wilber Arellano RPH Service:  (none) Author Type:  Pharmacist    Filed:  1/7/2019  4:57 PM Date of Service:  1/7/2019  4:51 PM Status:  Signed    :  Wilber Arellano RPH (Pharmacist)         Pharmacy[AW.1]   Pharmacy  Antibiotics/Antifungals/Antivirals:  Medication:      Active Orders     None        Route:        NA  Stop Date:  NA  Reason:      NA  Antihypertensives/Cardiac:  Medication:    Orders (72h ago through future)    Start     Ordered    01/03/19 2100  atorvastatin (LIPITOR) tablet 80 mg  NIGHTLY      01/03/19 1412    01/03/19 2100  prazosin (MINIPRESS) capsule 1 mg  NIGHTLY      01/03/19 1412    01/03/19 1412  hydrALAZINE (APRESOLINE) tablet 25 mg  EVERY 8 HOURS PRN      01/03/19 1412        Patient Vitals for the past 24 hrs:   BP Pulse   01/07/19 1400 126/68 74   01/07/19 0700 102/48 (!) 54   01/06/19 2020 118/62 (!) 55   01/06/19 1835 100/65 (!) 56       Anticoagulation:  Medication: Lovenox    Other key medications: A review of the medication list reveals no issues at this time.    Section completed by: Wilber Arellano RP[AW.2]     Attribution Delong     AW.1 - iWlber ATWOOD  Adan [facility] on 1/7/2019  4:52 PM   AW.2 - Wilber Arellano [facility] on 1/7/2019  4:51 PM                    DC Planning    DC destination/dispostion:  Return to [facility]. Lives at [facility].    Referrals: Not at this time, TBD.    DC Needs: DME for patient safety & mobility. MD f/u, requesting neuro referral.    Barriers to discharge:  Functional mobility deficits.    Strengths: Motivation, participation w/ therapies, age.    Section completed by:  Paty Hines R.N.    Summary: FWW w/ harness 20ft Jero, decreased activity tolerance, right shoulder pain, decreased initiative.    Physician Summary  Karan Smith MD participated and led team conference discussion.

## 2019-01-08 NOTE — CARE PLAN
Problem: Safety  Goal: Will remain free from injury  Patient uses call light consistently and appropriately this shift.  Waits for assistance when needed and does not attempt self transfer.  Able to verbalize needs.  Will continue to monitor.    Problem: Infection  Goal: Will remain free from infection  Patient remains free from s/s infection; afebrile.  Will continue to monitor.    Problem: Skin Integrity  Goal: Risk for impaired skin integrity will decrease  Patient's skin remains intact and free from new or accidental injury this shift. Pt has rash on abdomen and lefty hand, nystatin ointment as per order applied,  Will continue to monitor.    Problem: Pain Management  Goal: Pain level will decrease to patient's comfort goal  Patient able to verbalize needs.  Denies pain or discomfort this shift and no s/s same noted.  Will continue to monitor.

## 2019-01-08 NOTE — CARE PLAN
Problem: Communication  Goal: The ability to communicate needs accurately and effectively will improve    Intervention: Educate patient and significant other/support system about the plan of care, procedures, treatments, medications and allow for questions  Patient on depakote, valproic acid level was drawn this am (see results review, chem panel, therapeutic drug).

## 2019-01-08 NOTE — CARE PLAN
Problem: Bathing  Goal: STG-Within one week, patient will bathe  1) Individualized Goal:  SPV, DME and AE PRN  2) Interventions:  OT Orthotics Training, OT E Stim Attended, OT Group Therapy, OT Self Care/ADL, OT Cognitive Skill Dev, OT Community Reintegration, OT Manual Ther Technique, OT Neuro Re-Ed/Balance, OT Sensory Int Techniques, OT Therapeutic Activity, OT Aquatic Therapy, OT Evaluation and OT Therapeutic Exercise    Outcome: MET Date Met: 01/08/19      Problem: Dressing  Goal: STG-Within one week, patient will dress UB  1) Individualized Goal:  SPV, DME and AE PRN  2) Interventions:  OT Orthotics Training, OT E Stim Attended, OT Group Therapy, OT Self Care/ADL, OT Cognitive Skill Dev, OT Community Reintegration, OT Manual Ther Technique, OT Neuro Re-Ed/Balance, OT Sensory Int Techniques, OT Therapeutic Activity, OT Aquatic Therapy, OT Evaluation and OT Therapeutic Exercise    Outcome: MET Date Met: 01/08/19    Goal: STG-Within one week, patient will dress LB  1) Individualized Goal:  SPV, DME and AE PRN  2) Interventions:  OT Orthotics Training, OT E Stim Attended, OT Group Therapy, OT Self Care/ADL, OT Cognitive Skill Dev, OT Community Reintegration, OT Manual Ther Technique, OT Neuro Re-Ed/Balance, OT Sensory Int Techniques, OT Therapeutic Activity, OT Aquatic Therapy, OT Evaluation and OT Therapeutic Exercise    Outcome: MET Date Met: 01/08/19

## 2019-01-08 NOTE — REHAB-SLP IDT TEAM NOTE
Speech Therapy   Cognitive Linquistic Functions  Comprehension Initial:  6 - Modified Independent  Comprehension Current:  6 - Modified Independent   Comprehension Description:  Glasses  Expression Initial:  7 - Independent  Expression Current:  7 - Independent   Expression Description:     Social Interaction Initial:  6 - Modified Independent  Social Interaction Current:  6 - Modified Independent   Social Interaction Description:  Medication, Increased time  Problem Solving Initial:  4 - Minimal Assistance  Problem Solving Current:  4 - Minimal Assistance   Problem Solving Description:  Verbal cueing, Therapy schedule, Increased time  Memory Initial:  4 - Minimal Assistance  Memory Current:  5 - Standby Prompting/Supervision or Set-up   Memory Description:  Verbal cueing, Therapy schedule, Bed/chair alarm  Executive Functioning / Organization Initial:  Minimal (4)  Executive Functioning / Organization Current:  Minimal (4)   Executive Functioning Desciption:  Verbal cues to intiate.  Swallowing  Swallowing Status:  This patient does not receive dysphagia intervention.  Orders Placed This Encounter   Procedures   • Diet Order Regular     Standing Status:   Standing     Number of Occurrences:   1     Order Specific Question:   Diet:     Answer:   Regular [1]     Order Specific Question:   Consistency/Fluid modifications:     Answer:   Thin Liquids [3]     Comments:   chopped meats     Behavior Modification Plan  Keep instructions simple/concrete, Give clear feedback, Set clear goals and Analyze tasks (break down into smaller steps)  Assistive Technology  Low tech: Calendar, planner, schedule, alarms/timers, pill organizer, post-it notes, lists  Family Training/Education:  To be scheduled.  DC Recommendations:   To be determined  Strengths:  Able to follow instructions, Alert and oriented, Pleasant and cooperative and Willingly participates in therapeutic activities  Barriers:  Other: impaired self monitoring and self  regulation, impaired attention, impaired planning and organization skills, impaired initiation.  # of short term goals set=  3  # of short term goals met= 3       Speech Therapy Problems           Problem: Memory STGs     Dates: Start: 01/04/19       Goal: STG-Within one week, patient will     Dates: Start: 01/08/19       Description: 1) Individualized goal:  New training related to safety and stroke education with 90% acc with mod I.  2) Interventions:  SLP Speech Language Treatment, SLP Self Care / ADL Training , SLP Cognitive Skill Development and SLP Group Treatment               Problem: Problem Solving STGs     Dates: Start: 01/04/19       Goal: STG-Within one week, patient will     Dates: Start: 01/08/19       Description: 1) Individualized goal:  Perform alternating attention and functional organization tasks with 90% acc.  2) Interventions:  SLP Speech Language Treatment, SLP Self Care / ADL Training , SLP Cognitive Skill Development and SLP Group Treatment               Problem: Speech/Swallowing LTGs     Dates: Start: 01/04/19       Goal: LTG-By discharge, patient will solve basic problems     Dates: Start: 01/04/19       Description: 1) Individualized goal:  Chintan for safe discharge to assisted living facility  2) Interventions:  SLP Speech Language Treatment, SLP Cognitive Skill Development and SLP Group Treatment                    Section completed by:  Lulu Leija MS,CCC-SLP

## 2019-01-08 NOTE — REHAB-PT IDT TEAM NOTE
Physical Therapy   Mobility  Bed mobility:  SBA/SPV  Bed /Chair/Wheelchair Transfer Initial:  2 - Max Assistance  Bed /Chair/Wheelchair Transfer Current:  5 - Standby Prompting/Supervision or Set-up   Bed/Chair/Wheelchair Transfer Description:   (Reach pivot right/ left wc <> bed )  Walk Initial:  1 - Total Assistance  Walk Current:  1 - Total Assistance   Walk Description:  Walker, Safety concerns (Min A FWW 20 ft in SoloStep harness)  Wheelchair Initial:  1 - Total Assistance  Wheelchair Current:  5 - Standby Prompting/Supervision or Set-up   Wheelchair Description:  Supervision for safety ( ft + 200 ft indoors BUE)  Stairs Initial:  0 - Not tested,unsafe activity  Stairs Current: 0 - Not tested,unsafe activity   Stairs Description:    Patient/Family Training/Education: Patient has been educated on safety with mobility and POC  DME/DC Recommendations: Home health PT, anticipate FWW  Strengths:  Independent PLOF, Motivated for self care and independence and Willingly participates in therapeutic activities  Barriers:   Decreased endurance, Generalized weakness, Hemiplegia, Limited mobility, Poor activity tolerance and Poor balance  # of short term goals set= 3  # of short term goals met= 3       Physical Therapy Problems           Problem: Mobility     Dates: Start: 01/04/19       Goal: STG-Within one week, patient will ambulate household distance     Dates: Start: 01/08/19       Description: 1) Individualized goal:  Patient will amb with FWW 50 ft x 2 indoors CGA-SBA  2) Interventions:  PT Group Therapy, PT E Stim Attended, PT Gait Training, PT Therapeutic Exercises, PT Neuro Re-Ed/Balance, PT Aquatic Therapy, PT Therapeutic Activity and PT Manual Therapy               Problem: Mobility Transfers     Dates: Start: 01/04/19       Goal: STG-Within one week, patient will transfer bed to chair     Dates: Start: 01/08/19       Description: 1) Individualized goal:  Patient will transfer mod I wc <> with bed rail;  mod I bed mob   2) Interventions:   PT Group Therapy, PT E Stim Attended, PT Gait Training, PT Therapeutic Exercises, PT Neuro Re-Ed/Balance, PT Aquatic Therapy, PT Therapeutic Activity and PT Manual Therapy               Problem: PT-Long Term Goals     Dates: Start: 01/04/19       Goal: LTG-By discharge, patient will ambulate     Dates: Start: 01/04/19       Description: 1) Individualized goal:  AMB x 150 feet with appropriate AD and SPV  2) Interventions:  PT Group Therapy, PT E Stim Attended, PT Gait Training, PT Therapeutic Exercises, PT Neuro Re-Ed/Balance, PT Aquatic Therapy, PT Therapeutic Activity, PT Manual Therapy and PT Evaluation             Goal: LTG-By discharge, patient will transfer one surface to another     Dates: Start: 01/04/19       Description: 1) Individualized goal:  SPT with appropriate AD and SPV  2) Interventions:  PT Group Therapy, PT E Stim Attended, PT Gait Training, PT Therapeutic Exercises, PT Neuro Re-Ed/Balance, PT Aquatic Therapy, PT Therapeutic Activity, PT Manual Therapy and PT Evaluation             Goal: LTG-By discharge, patient will transfer in/out of a car     Dates: Start: 01/04/19       Description: 1) Individualized goal:  Car transfer with appropriate AD and SPV  2) Interventions:  PT Group Therapy, PT E Stim Attended, PT Gait Training, PT Therapeutic Exercises, PT Neuro Re-Ed/Balance, PT Aquatic Therapy, PT Therapeutic Activity, PT Manual Therapy and PT Evaluation                   Section completed by:  Brook Tee PT, DPT

## 2019-01-08 NOTE — PROGRESS NOTES
"Rehab Progress Note     Encounter Date: 1/8/2019    CC: Conversion disorder; right sided weakness    Interval Events (Subjective)  Patient sitting up in room watching TV. He reports his mother came in last night and they worked on his exercises. He can now move his right hand and right leg. He reports it is slowly improving in strength.  Denies NVD.  Denies SOB or cough. Discussed will have IDT today.    IDT Team Meeting 1/8/2019    IKaran M.D., was present and led the interdisciplinary team conference on 1/8/2019.  I led the IDT conference and agree with the IDT conference documentation and plan of care as noted below.     RN:  Diet Regular   % Meal     Pain    Sleep    Bowel Continent   Bladder Continent   In's & Out's    Rash improving  Valproic acid levels    PT:  Bed Mobility    Transfers    Mobility FWW on solostep 20 feet at Jero; short steps; 350 feet in wheelchairs limited by shoulder pain   Stairs    Limited by activity tolerance    OT:  Eating    Grooming    Bathing    UB Dressing    LB Dressing SBA   Toileting SBA   Shower & Transfer supervs   Safety is good; increasing tolerance  When he chooses   Shower chair; grab bar    SLP:  Jero for problem solving and executive functioning  Limited by initiation    CM:  Continues to work on disposition and DME needs.      DC/Disposition:  1/11/18    Objective:  VITAL SIGNS: BP (!) 91/59   Pulse (!) 54   Temp 37.2 °C (99 °F) (Oral)   Resp 16   Ht 1.981 m (6' 6\")   Wt (!) 130.5 kg (287 lb 11.2 oz)   SpO2 91%   BMI 33.25 kg/m²   Gen: NAD  Psych: Mood and affect appropriate  CV: RRR, no edema  Resp: CTAB, no upper airway sounds  Abd: NTND  Neuro: Right finger flexors 3/5, 3/5 EF. Right knee extensors 3/5 and ankle dorsiflexors 3/5    Recent Results (from the past 72 hour(s))   VALPROIC ACID    Collection Time: 01/08/19  5:11 AM   Result Value Ref Range    Valproic Acid 86.3 50.0 - 100.0 ug/mL       Current Facility-Administered Medications "   Medication Frequency   • senna-docusate (PERICOLACE or SENOKOT S) 8.6-50 MG per tablet 2 Tab QDAY PRN    And   • polyethylene glycol/lytes (MIRALAX) PACKET 1 Packet QDAY PRN    And   • magnesium hydroxide (MILK OF MAGNESIA) suspension 30 mL QDAY PRN    And   • bisacodyl (DULCOLAX) suppository 10 mg QDAY PRN   • vitamin D (cholecalciferol) tablet 1,000 Units DAILY   • Respiratory Care per Protocol Continuous RT   • Pharmacy Consult Request ...Pain Management Review 1 Each PRN   • hydrALAZINE (APRESOLINE) tablet 25 mg Q8HRS PRN   • acetaminophen (TYLENOL) tablet 650 mg Q4HRS PRN   • artificial tears 1.4 % ophthalmic solution 1 Drop PRN   • benzocaine-menthol (CEPACOL) lozenge 1 Lozenge Q2HRS PRN   • mag hydrox-al hydrox-simeth (MAALOX PLUS ES or MYLANTA DS) suspension 20 mL Q2HRS PRN   • ondansetron (ZOFRAN ODT) dispertab 4 mg 4X/DAY PRN    Or   • ondansetron (ZOFRAN) syringe/vial injection 4 mg 4X/DAY PRN   • traZODone (DESYREL) tablet 50 mg QHS PRN   • sodium chloride (OCEAN) 0.65 % nasal spray 2 Spray PRN   • enoxaparin (LOVENOX) inj 40 mg DAILY   • atorvastatin (LIPITOR) tablet 80 mg Nightly   • busPIRone (BUSPAR) tablet 15 mg TID   • divalproex (DEPAKOTE) delayed-release tablet 1,500 mg Q EVENING   • divalproex (DEPAKOTE) delayed-release tablet 500 mg DAILY   • montelukast (SINGULAIR) tablet 10 mg Nightly   • nystatin (MYCOSTATIN) cream BID   • prazosin (MINIPRESS) capsule 1 mg Nightly   • sertraline (ZOLOFT) tablet 100 mg DAILY   • levothyroxine (SYNTHROID) tablet 112 mcg AM ES    And   • levothyroxine (SYNTHROID) tablet 25 mcg AM ES   • albuterol inhaler 2 Puff Q4HRS PRN       Orders Placed This Encounter   Procedures   • Diet Order Regular     Standing Status:   Standing     Number of Occurrences:   1     Order Specific Question:   Diet:     Answer:   Regular [1]     Order Specific Question:   Consistency/Fluid modifications:     Answer:   Thin Liquids [3]     Comments:   chopped meats        Assessment:  Active Hospital Problems    Diagnosis   • *Conversion disorder   • Right sided weakness   • Bipolar disorder (HCC)   • Acquired hypothyroidism   • Depression   • HLD (hyperlipidemia)   • Seizure (HCC)   • Asthma       Medical Decision Making and Plan:  Conversion Disorder vs Santosh’s Paralysis - Patient with weakness on right side after waking with history of multiple seizures. Depakote level was low on admission and EEG showed bilateral temporal cortical dysfunction.  Patient has had multiple admission for similar complaint and no neurologic symptoms other than weakness.  Neurology and psychiatry have followed him in the past. May be a component of malingering as multiple admissions for similar symptoms at relatively regular occurences  -Less likely stroke, will discontinue ASA  -PT and OT for mobility and ADLs. Most likely will improve with positive encouragement     Seizure disorder - Patient with history of TBI vs encephalitis with post-traumatic seizures.  Patient on Depakote 500/1500 mg on transfer.  EEG with bilateral temporal cortical dysfunction.   -Continue Depakote 500 / 1500. Level 86.3 on 1/8     BPD1/Depression - Patient on Buspar 15 mg TID and Sertaline on transfer in addition to Depakote.  Unclear if Prazosin is for PTSD as listed as current diagnosis.      Asthma - On Singulair nightly at home.     HLD - Cholesterol 209, Triglyceride 416 and HDL 32 on 1/1/19. Started on Atorvastatin 80 mg QHS     Obesity - Patient with BMI of 33 on admission. Will consult dietitian      Anemia - 13.6 on admission up from 13.2 on 1/1/19    DM - History of DM, A1c 6.6 on admission. He reports being diet controlled. Will continue discussion    Hypothyroidism - Patient on 137 mcg daily     Tobacco use - Patient will need counseling prior to discharge.  Counseling performed on 1/8/18, he reports he has recently quit but will continue to try to not smoke.     Vitamin D - 23 on admission, started on 1000 U  supplement    DVT Ppx - Patient on Lovenox on transfer.     Total time:  35 minutes.  I spent greater than 50% of the time for patient care, counseling, and coordination on this date, including unit/floor time, and face-to-face time with the patient as per interval events and assessment and plan above. Topics discussed included discharge planning, depakote level checked and wnl, and discussed prognosis with disorder (good).  Patient was discussed separately in IDT today; please see details above.    Karan Smiht M.D.    I had a 5 minute discussion with patient on 1/8/2019 about smoking cessation.  Discussed that smoking is associated with respiratory, cardiovascular, oncologic, and neurologic illnesses.  Patient was provided advice and counseling on different methods of smoking cessation as well as provided supportive listening for psychologic aspect of addiction.

## 2019-01-09 PROCEDURE — A9270 NON-COVERED ITEM OR SERVICE: HCPCS | Performed by: PHYSICAL MEDICINE & REHABILITATION

## 2019-01-09 PROCEDURE — 97110 THERAPEUTIC EXERCISES: CPT

## 2019-01-09 PROCEDURE — G0515 COGNITIVE SKILLS DEVELOPMENT: HCPCS

## 2019-01-09 PROCEDURE — 97112 NEUROMUSCULAR REEDUCATION: CPT

## 2019-01-09 PROCEDURE — 97530 THERAPEUTIC ACTIVITIES: CPT

## 2019-01-09 PROCEDURE — 770010 HCHG ROOM/CARE - REHAB SEMI PRIVAT*

## 2019-01-09 PROCEDURE — 700102 HCHG RX REV CODE 250 W/ 637 OVERRIDE(OP): Performed by: PHYSICAL MEDICINE & REHABILITATION

## 2019-01-09 PROCEDURE — 99232 SBSQ HOSP IP/OBS MODERATE 35: CPT | Performed by: PHYSICAL MEDICINE & REHABILITATION

## 2019-01-09 PROCEDURE — 700111 HCHG RX REV CODE 636 W/ 250 OVERRIDE (IP): Performed by: PHYSICAL MEDICINE & REHABILITATION

## 2019-01-09 PROCEDURE — 97116 GAIT TRAINING THERAPY: CPT

## 2019-01-09 RX ADMIN — PRAZOSIN HYDROCHLORIDE 1 MG: 1 CAPSULE ORAL at 19:38

## 2019-01-09 RX ADMIN — BUSPIRONE HYDROCHLORIDE 15 MG: 15 TABLET ORAL at 19:38

## 2019-01-09 RX ADMIN — ENOXAPARIN SODIUM 40 MG: 100 INJECTION SUBCUTANEOUS at 08:48

## 2019-01-09 RX ADMIN — LEVOTHYROXINE SODIUM 112 MCG: 112 TABLET ORAL at 05:06

## 2019-01-09 RX ADMIN — BUSPIRONE HYDROCHLORIDE 15 MG: 15 TABLET ORAL at 14:52

## 2019-01-09 RX ADMIN — DIVALPROEX SODIUM 1500 MG: 500 TABLET, DELAYED RELEASE ORAL at 19:38

## 2019-01-09 RX ADMIN — SERTRALINE HYDROCHLORIDE 100 MG: 50 TABLET ORAL at 08:48

## 2019-01-09 RX ADMIN — NYSTATIN: 100000 CREAM TOPICAL at 19:43

## 2019-01-09 RX ADMIN — ATORVASTATIN CALCIUM 80 MG: 40 TABLET, FILM COATED ORAL at 19:38

## 2019-01-09 RX ADMIN — NYSTATIN: 100000 CREAM TOPICAL at 08:51

## 2019-01-09 RX ADMIN — MONTELUKAST SODIUM 10 MG: 10 TABLET, FILM COATED ORAL at 19:38

## 2019-01-09 RX ADMIN — BUSPIRONE HYDROCHLORIDE 15 MG: 15 TABLET ORAL at 08:48

## 2019-01-09 RX ADMIN — DIVALPROEX SODIUM 500 MG: 500 TABLET, DELAYED RELEASE ORAL at 08:47

## 2019-01-09 RX ADMIN — LEVOTHYROXINE SODIUM 25 MCG: 25 TABLET ORAL at 05:05

## 2019-01-09 RX ADMIN — VITAMIN D, TAB 1000IU (100/BT) 1000 UNITS: 25 TAB at 08:48

## 2019-01-09 ASSESSMENT — GAIT ASSESSMENTS
ASSISTIVE DEVICE: FRONT WHEEL WALKER
DEVIATION: BRADYKINETIC;DECREASED HEEL STRIKE;DECREASED TOE OFF
DISTANCE (FEET): 125
GAIT LEVEL OF ASSIST: STAND BY ASSIST

## 2019-01-09 ASSESSMENT — PAIN SCALES - GENERAL: PAINLEVEL_OUTOF10: 0

## 2019-01-09 NOTE — DISCHARGE PLANNING
Left message for admissions at [facility], [phone]. Patient is resident there. They will come to rehab to assess, prior to discharge Friday, for appropriateness of return to [facility].

## 2019-01-09 NOTE — CARE PLAN
Problem: Safety  Goal: Will remain free from injury  Patient uses call light consistently and appropriately this shift.  Waits for assistance when needed and does not attempt self transfer.  Able to verbalize needs. Remains free of accidental injury, no seizure activity noted, Will continue to monitor.    Problem: Pain Management  Goal: Pain level will decrease to patient's comfort goal  Patient able to verbalize needs.  Denies pain or discomfort this shift and no s/s same noted.  Will continue to monitor.

## 2019-01-09 NOTE — PROGRESS NOTES
"Rehab Progress Note     Encounter Date: 1/9/2019    CC: Conversion disorder; right sided weakness    Interval Events (Subjective)  Patient sitting up in room. He reports his strength has improved much since yesterday, now with resistance in hand and leg. He reports he lives in a group home and they have adaptive equipment that he may need. Discussed discharge planning for Friday. He reports he is able to manage his own medications. Denies NVD    IDT Team Meeting 1/8/2019  DC/Disposition:  1/11/18    Objective:  VITAL SIGNS: /60   Pulse (!) 59   Temp 36.4 °C (97.6 °F) (Oral)   Resp 18   Ht 1.981 m (6' 6\")   Wt (!) 130.5 kg (287 lb 11.2 oz)   SpO2 91%   BMI 33.25 kg/m²   Gen: NAD  Psych: Mood and affect appropriate  CV: RRR, no edema  Resp: CTAB, no upper airway sounds  Abd: NTND  Neuro: AOx4, 4/5 RUE and RLE    Recent Results (from the past 72 hour(s))   VALPROIC ACID    Collection Time: 01/08/19  5:11 AM   Result Value Ref Range    Valproic Acid 86.3 50.0 - 100.0 ug/mL       Current Facility-Administered Medications   Medication Frequency   • senna-docusate (PERICOLACE or SENOKOT S) 8.6-50 MG per tablet 2 Tab QDAY PRN    And   • polyethylene glycol/lytes (MIRALAX) PACKET 1 Packet QDAY PRN    And   • magnesium hydroxide (MILK OF MAGNESIA) suspension 30 mL QDAY PRN    And   • bisacodyl (DULCOLAX) suppository 10 mg QDAY PRN   • vitamin D (cholecalciferol) tablet 1,000 Units DAILY   • Respiratory Care per Protocol Continuous RT   • Pharmacy Consult Request ...Pain Management Review 1 Each PRN   • hydrALAZINE (APRESOLINE) tablet 25 mg Q8HRS PRN   • acetaminophen (TYLENOL) tablet 650 mg Q4HRS PRN   • artificial tears 1.4 % ophthalmic solution 1 Drop PRN   • benzocaine-menthol (CEPACOL) lozenge 1 Lozenge Q2HRS PRN   • mag hydrox-al hydrox-simeth (MAALOX PLUS ES or MYLANTA DS) suspension 20 mL Q2HRS PRN   • ondansetron (ZOFRAN ODT) dispertab 4 mg 4X/DAY PRN    Or   • ondansetron (ZOFRAN) syringe/vial injection " 4 mg 4X/DAY PRN   • traZODone (DESYREL) tablet 50 mg QHS PRN   • sodium chloride (OCEAN) 0.65 % nasal spray 2 Spray PRN   • enoxaparin (LOVENOX) inj 40 mg DAILY   • atorvastatin (LIPITOR) tablet 80 mg Nightly   • busPIRone (BUSPAR) tablet 15 mg TID   • divalproex (DEPAKOTE) delayed-release tablet 1,500 mg Q EVENING   • divalproex (DEPAKOTE) delayed-release tablet 500 mg DAILY   • montelukast (SINGULAIR) tablet 10 mg Nightly   • nystatin (MYCOSTATIN) cream BID   • prazosin (MINIPRESS) capsule 1 mg Nightly   • sertraline (ZOLOFT) tablet 100 mg DAILY   • levothyroxine (SYNTHROID) tablet 112 mcg AM ES    And   • levothyroxine (SYNTHROID) tablet 25 mcg AM ES   • albuterol inhaler 2 Puff Q4HRS PRN       Orders Placed This Encounter   Procedures   • Diet Order Regular     Standing Status:   Standing     Number of Occurrences:   1     Order Specific Question:   Diet:     Answer:   Regular [1]     Order Specific Question:   Consistency/Fluid modifications:     Answer:   Thin Liquids [3]     Comments:   chopped meats       Assessment:  Active Hospital Problems    Diagnosis   • *Conversion disorder   • Right sided weakness   • Bipolar disorder (HCC)   • Acquired hypothyroidism   • Depression   • HLD (hyperlipidemia)   • Seizure (HCC)   • Asthma       Medical Decision Making and Plan:  Conversion Disorder vs Santosh’s Paralysis - Patient with weakness on right side after waking with history of multiple seizures. Depakote level was low on admission and EEG showed bilateral temporal cortical dysfunction.  Patient has had multiple admission for similar complaint and no neurologic symptoms other than weakness.  Neurology and psychiatry have followed him in the past. May be a component of malingering as multiple admissions for similar symptoms at relatively regular occurences  -Less likely stroke, will discontinue ASA  -PT and OT for mobility and ADLs. Most likely will improve with positive encouragement     Seizure disorder - Patient  with history of TBI vs encephalitis with post-traumatic seizures.  Patient on Depakote 500/1500 mg on transfer.  EEG with bilateral temporal cortical dysfunction.   -Continue Depakote 500 / 1500. Level 86.3 on 1/8     BPD1/Depression - Patient on Buspar 15 mg TID and Sertaline on transfer in addition to Depakote.  Unclear if Prazosin is for PTSD as listed as current diagnosis.      Asthma - On Singulair nightly at home.     HLD - Cholesterol 209, Triglyceride 416 and HDL 32 on 1/1/19. Started on Atorvastatin 80 mg QHS     Obesity - Patient with BMI of 33 on admission. Will consult dietitian      Anemia - 13.6 on admission up from 13.2 on 1/1/19    DM - History of DM, A1c 6.6 on admission. He reports being diet controlled. Will continue discussion    Hypothyroidism - Patient on 137 mcg daily     Tobacco use - Patient will need counseling prior to discharge.  Counseling performed on 1/8/18, he reports he has recently quit but will continue to try to not smoke.     Vitamin D - 23 on admission, started on 1000 U supplement    DVT Ppx - Patient on Lovenox on transfer.     Total time:  25 minutes.  I spent greater than 50% of the time for patient care, counseling, and coordination on this date, including unit/floor time, and face-to-face time with the patient as per interval events and assessment and plan above. Topics discussed included discharge planning for Friday and discharge medications. Patient able to manage medications himself but will have help at home.     Karan Smith M.D.

## 2019-01-10 DIAGNOSIS — Z86.61 HISTORY OF ENCEPHALITIS: ICD-10-CM

## 2019-01-10 DIAGNOSIS — G40.909 SEIZURE DISORDER (HCC): ICD-10-CM

## 2019-01-10 PROCEDURE — 770010 HCHG ROOM/CARE - REHAB SEMI PRIVAT*

## 2019-01-10 PROCEDURE — 97116 GAIT TRAINING THERAPY: CPT

## 2019-01-10 PROCEDURE — 97535 SELF CARE MNGMENT TRAINING: CPT

## 2019-01-10 PROCEDURE — 700111 HCHG RX REV CODE 636 W/ 250 OVERRIDE (IP): Performed by: PHYSICAL MEDICINE & REHABILITATION

## 2019-01-10 PROCEDURE — 97530 THERAPEUTIC ACTIVITIES: CPT

## 2019-01-10 PROCEDURE — 99232 SBSQ HOSP IP/OBS MODERATE 35: CPT | Performed by: PHYSICAL MEDICINE & REHABILITATION

## 2019-01-10 PROCEDURE — A9270 NON-COVERED ITEM OR SERVICE: HCPCS | Performed by: PHYSICAL MEDICINE & REHABILITATION

## 2019-01-10 PROCEDURE — 97110 THERAPEUTIC EXERCISES: CPT

## 2019-01-10 PROCEDURE — G0515 COGNITIVE SKILLS DEVELOPMENT: HCPCS

## 2019-01-10 PROCEDURE — 700102 HCHG RX REV CODE 250 W/ 637 OVERRIDE(OP): Performed by: PHYSICAL MEDICINE & REHABILITATION

## 2019-01-10 RX ORDER — LEVOTHYROXINE SODIUM 137 UG/1
137 TABLET ORAL
Qty: 30 TAB | Refills: 2 | Status: SHIPPED | OUTPATIENT
Start: 2019-01-10 | End: 2019-03-14

## 2019-01-10 RX ORDER — SERTRALINE HYDROCHLORIDE 100 MG/1
100 TABLET, FILM COATED ORAL EVERY MORNING
Qty: 30 TAB | Refills: 2 | Status: SHIPPED | OUTPATIENT
Start: 2019-01-10 | End: 2019-07-21

## 2019-01-10 RX ORDER — BUSPIRONE HYDROCHLORIDE 15 MG/1
15 TABLET ORAL 3 TIMES DAILY
Qty: 90 TAB | Refills: 2 | Status: SHIPPED | OUTPATIENT
Start: 2019-01-10 | End: 2019-07-21

## 2019-01-10 RX ORDER — ATORVASTATIN CALCIUM 80 MG/1
80 TABLET, FILM COATED ORAL DAILY
Qty: 90 TAB | Refills: 1 | Status: SHIPPED | OUTPATIENT
Start: 2019-01-10 | End: 2019-07-21

## 2019-01-10 RX ORDER — DIVALPROEX SODIUM 500 MG/1
500-1500 TABLET, DELAYED RELEASE ORAL 2 TIMES DAILY
Qty: 120 TAB | Refills: 2 | Status: SHIPPED | OUTPATIENT
Start: 2019-01-10 | End: 2019-07-21

## 2019-01-10 RX ORDER — PRAZOSIN HYDROCHLORIDE 1 MG/1
1 CAPSULE ORAL EVERY EVENING
Qty: 30 CAP | Refills: 3 | Status: SHIPPED | OUTPATIENT
Start: 2019-01-10 | End: 2019-03-14

## 2019-01-10 RX ORDER — MONTELUKAST SODIUM 10 MG/1
10 TABLET ORAL DAILY
Qty: 30 TAB | Refills: 2 | Status: SHIPPED | OUTPATIENT
Start: 2019-01-10 | End: 2019-07-21

## 2019-01-10 RX ADMIN — BUSPIRONE HYDROCHLORIDE 15 MG: 15 TABLET ORAL at 15:01

## 2019-01-10 RX ADMIN — MONTELUKAST SODIUM 10 MG: 10 TABLET, FILM COATED ORAL at 21:08

## 2019-01-10 RX ADMIN — ATORVASTATIN CALCIUM 80 MG: 40 TABLET, FILM COATED ORAL at 21:07

## 2019-01-10 RX ADMIN — NYSTATIN: 100000 CREAM TOPICAL at 08:51

## 2019-01-10 RX ADMIN — DIVALPROEX SODIUM 500 MG: 500 TABLET, DELAYED RELEASE ORAL at 08:49

## 2019-01-10 RX ADMIN — DIVALPROEX SODIUM 1500 MG: 500 TABLET, DELAYED RELEASE ORAL at 21:07

## 2019-01-10 RX ADMIN — ENOXAPARIN SODIUM 40 MG: 100 INJECTION SUBCUTANEOUS at 08:49

## 2019-01-10 RX ADMIN — VITAMIN D, TAB 1000IU (100/BT) 1000 UNITS: 25 TAB at 08:49

## 2019-01-10 RX ADMIN — LEVOTHYROXINE SODIUM 25 MCG: 25 TABLET ORAL at 05:12

## 2019-01-10 RX ADMIN — PRAZOSIN HYDROCHLORIDE 1 MG: 1 CAPSULE ORAL at 21:08

## 2019-01-10 RX ADMIN — SERTRALINE HYDROCHLORIDE 100 MG: 50 TABLET ORAL at 08:49

## 2019-01-10 RX ADMIN — NYSTATIN: 100000 CREAM TOPICAL at 21:08

## 2019-01-10 RX ADMIN — BUSPIRONE HYDROCHLORIDE 15 MG: 15 TABLET ORAL at 08:49

## 2019-01-10 RX ADMIN — BUSPIRONE HYDROCHLORIDE 15 MG: 15 TABLET ORAL at 21:08

## 2019-01-10 RX ADMIN — LEVOTHYROXINE SODIUM 112 MCG: 112 TABLET ORAL at 05:11

## 2019-01-10 ASSESSMENT — PATIENT HEALTH QUESTIONNAIRE - PHQ9
1. LITTLE INTEREST OR PLEASURE IN DOING THINGS: NOT AT ALL
SUM OF ALL RESPONSES TO PHQ9 QUESTIONS 1 AND 2: 0
2. FEELING DOWN, DEPRESSED, IRRITABLE, OR HOPELESS: NOT AT ALL

## 2019-01-10 ASSESSMENT — GAIT ASSESSMENTS
DEVIATION: BRADYKINETIC;DECREASED HEEL STRIKE;DECREASED TOE OFF
DISTANCE (FEET): 220
GAIT LEVEL OF ASSIST: SUPERVISED
ASSISTIVE DEVICE: FRONT WHEEL WALKER

## 2019-01-10 ASSESSMENT — ACTIVITIES OF DAILY LIVING (ADL)
SHOWER_TRANSFER_LEVEL_OF_ASSIST: REQUIRES SUPERVISION WITH SHOWER TRANSFER
TOILETING_LEVEL_OF_ASSIST: ABLE TO COMPLETE TOILETING WITHOUT ASSIST
TOILET_TRANSFER_LEVEL_OF_ASSIST: ABLE TO COMPLETE TOILET TRANSFER WITHOUT ASSIST

## 2019-01-10 ASSESSMENT — PAIN SCALES - GENERAL: PAINLEVEL_OUTOF10: 0

## 2019-01-10 NOTE — PROGRESS NOTES
"Rehab Progress Note     Encounter Date: 1/10/2019    CC: Conversion disorder; right sided weakness    Interval Events (Subjective)  Patient sitting up watching TV. He reports he continues to improve in strength. He denies pain at this time. Discussed with patient that his facility will come out to evaluate the patient prior to discharge.  He thinks he will be able to get around independently. Denies NVD or constipation.     IDT Team Meeting 1/8/2019  DC/Disposition:  1/11/18    Objective:  VITAL SIGNS: BP (!) 96/61   Pulse (!) 55   Temp 36.7 °C (98 °F)   Resp 19   Ht 1.981 m (6' 6\")   Wt (!) 130.5 kg (287 lb 11.2 oz)   SpO2 94%   BMI 33.25 kg/m²   Gen: NAD  Psych: Mood and affect appropriate  CV: RRR, no edema  Resp: CTAB, no upper airway sounds  Abd: NTND  Neuro: AOx4, 4/5 RUE and RLE  Unchanged 1/9/19     Recent Results (from the past 72 hour(s))   VALPROIC ACID    Collection Time: 01/08/19  5:11 AM   Result Value Ref Range    Valproic Acid 86.3 50.0 - 100.0 ug/mL       Current Facility-Administered Medications   Medication Frequency   • senna-docusate (PERICOLACE or SENOKOT S) 8.6-50 MG per tablet 2 Tab QDAY PRN    And   • polyethylene glycol/lytes (MIRALAX) PACKET 1 Packet QDAY PRN    And   • magnesium hydroxide (MILK OF MAGNESIA) suspension 30 mL QDAY PRN    And   • bisacodyl (DULCOLAX) suppository 10 mg QDAY PRN   • vitamin D (cholecalciferol) tablet 1,000 Units DAILY   • Respiratory Care per Protocol Continuous RT   • Pharmacy Consult Request ...Pain Management Review 1 Each PRN   • hydrALAZINE (APRESOLINE) tablet 25 mg Q8HRS PRN   • acetaminophen (TYLENOL) tablet 650 mg Q4HRS PRN   • artificial tears 1.4 % ophthalmic solution 1 Drop PRN   • benzocaine-menthol (CEPACOL) lozenge 1 Lozenge Q2HRS PRN   • mag hydrox-al hydrox-simeth (MAALOX PLUS ES or MYLANTA DS) suspension 20 mL Q2HRS PRN   • ondansetron (ZOFRAN ODT) dispertab 4 mg 4X/DAY PRN    Or   • ondansetron (ZOFRAN) syringe/vial injection 4 mg " 4X/DAY PRN   • traZODone (DESYREL) tablet 50 mg QHS PRN   • sodium chloride (OCEAN) 0.65 % nasal spray 2 Spray PRN   • enoxaparin (LOVENOX) inj 40 mg DAILY   • atorvastatin (LIPITOR) tablet 80 mg Nightly   • busPIRone (BUSPAR) tablet 15 mg TID   • divalproex (DEPAKOTE) delayed-release tablet 1,500 mg Q EVENING   • divalproex (DEPAKOTE) delayed-release tablet 500 mg DAILY   • montelukast (SINGULAIR) tablet 10 mg Nightly   • nystatin (MYCOSTATIN) cream BID   • prazosin (MINIPRESS) capsule 1 mg Nightly   • sertraline (ZOLOFT) tablet 100 mg DAILY   • levothyroxine (SYNTHROID) tablet 112 mcg AM ES    And   • levothyroxine (SYNTHROID) tablet 25 mcg AM ES   • albuterol inhaler 2 Puff Q4HRS PRN       Orders Placed This Encounter   Procedures   • Diet Order Regular     Standing Status:   Standing     Number of Occurrences:   1     Order Specific Question:   Diet:     Answer:   Regular [1]     Order Specific Question:   Consistency/Fluid modifications:     Answer:   Thin Liquids [3]     Comments:   chopped meats       Assessment:  Active Hospital Problems    Diagnosis   • *Conversion disorder   • Right sided weakness   • Bipolar disorder (HCC)   • Acquired hypothyroidism   • Depression   • HLD (hyperlipidemia)   • Seizure (HCC)   • Asthma       Medical Decision Making and Plan:  Conversion Disorder vs Santosh’s Paralysis - Patient with weakness on right side after waking with history of multiple seizures. Depakote level was low on admission and EEG showed bilateral temporal cortical dysfunction.  Patient has had multiple admission for similar complaint and no neurologic symptoms other than weakness.  Neurology and psychiatry have followed him in the past. May be a component of malingering as multiple admissions for similar symptoms at relatively regular occurences  -Less likely stroke, will discontinue ASA  -PT and OT for mobility and ADLs. Most likely will improve with positive encouragement     Seizure disorder - Patient with  history of TBI vs encephalitis with post-traumatic seizures.  Patient on Depakote 500/1500 mg on transfer.  EEG with bilateral temporal cortical dysfunction.   -Continue Depakote 500 / 1500. Level 86.3 on 1/8     BPD1/Depression - Patient on Buspar 15 mg TID and Sertaline on transfer in addition to Depakote.  Unclear if Prazosin is for PTSD as listed as current diagnosis.      Asthma - On Singulair nightly at home.     HLD - Cholesterol 209, Triglyceride 416 and HDL 32 on 1/1/19. Started on Atorvastatin 80 mg QHS     Obesity - Patient with BMI of 33 on admission. Will consult dietitian      Anemia - 13.6 on admission up from 13.2 on 1/1/19    DM - History of DM, A1c 6.6 on admission. He reports being diet controlled. Will continue discussion    Hypothyroidism - Patient on 137 mcg daily     Tobacco use - Patient will need counseling prior to discharge.  Counseling performed on 1/8/18, he reports he has recently quit but will continue to try to not smoke.     Vitamin D - 23 on admission, started on 1000 U supplement    DVT Ppx - Patient on Lovenox on transfer.  Ambulated >125 feet x2. Will discontinue Lovenox    Total time:  30 minutes.  I spent greater than 50% of the time for patient care, counseling, and coordination on this date, including unit/floor time, and face-to-face time with the patient as per interval events and assessment and plan above. Topics discussed included discharge planning, needs to evaluated by facility, and discontinue Lovenox.     Karan Smith M.D.

## 2019-01-10 NOTE — CARE PLAN
Problem: OT Long Term Goals  Goal: LTG-By discharge, patient will complete basic self care tasks  1) Individualized Goal:  SPV, DME and AE PRN  2) Interventions:  OT Orthotics Training, OT E Stim Attended, OT Group Therapy, OT Self Care/ADL, OT Cognitive Skill Dev, OT Community Reintegration, OT Manual Ther Technique, OT Neuro Re-Ed/Balance, OT Sensory Int Techniques, OT Therapeutic Activity, OT Aquatic Therapy, OT Evaluation and OT Therapeutic Exercise     Outcome: MET Date Met: 01/10/19    Goal: LTG-By discharge, patient will perform bathroom transfers  1) Individualized Goal:  SPV, DME and AE PRN  2) Interventions:  OT Orthotics Training, OT E Stim Attended, OT Group Therapy, OT Self Care/ADL, OT Cognitive Skill Dev, OT Community Reintegration, OT Manual Ther Technique, OT Neuro Re-Ed/Balance, OT Sensory Int Techniques, OT Therapeutic Activity, OT Aquatic Therapy, OT Evaluation and OT Therapeutic Exercise     Outcome: MET Date Met: 01/10/19

## 2019-01-10 NOTE — CARE PLAN
Problem: Safety  Goal: Will remain free from falls    Intervention: Assess risk factors for falls  Patient uses call light consistently and appropriately this shift.  Waits for assistance when needed and does not attempt self transfer.  Able to verbalize needs.  Will continue to monitor.      Problem: Skin Integrity  Goal: Risk for impaired skin integrity will decrease  Patient's skin remains intact and free from new or accidental injury this shift.rash on abd and left arm remains unchanged,  Will continue to monitor.    Problem: Pain Management  Goal: Pain level will decrease to patient's comfort goal  Patient able to verbalize pain level and verbalize an acceptable level of pain.

## 2019-01-10 NOTE — CARE PLAN
Problem: Skin Integrity  Goal: Risk for impaired skin integrity will decrease  Outcome: PROGRESSING AS EXPECTED  Pt is able to verbalize s/s of infection: redness of area, fever, pain. , has some minor redness on left arm and ABD area, cram applied per order, pt tolerated well, will continue to monitor and treat as ordered.

## 2019-01-11 VITALS
HEART RATE: 50 BPM | TEMPERATURE: 97.9 F | BODY MASS INDEX: 33.29 KG/M2 | RESPIRATION RATE: 18 BRPM | OXYGEN SATURATION: 96 % | SYSTOLIC BLOOD PRESSURE: 127 MMHG | DIASTOLIC BLOOD PRESSURE: 64 MMHG | WEIGHT: 287.7 LBS | HEIGHT: 78 IN

## 2019-01-11 PROCEDURE — 770010 HCHG ROOM/CARE - REHAB SEMI PRIVAT*

## 2019-01-11 PROCEDURE — 700102 HCHG RX REV CODE 250 W/ 637 OVERRIDE(OP): Performed by: PHYSICAL MEDICINE & REHABILITATION

## 2019-01-11 PROCEDURE — A9270 NON-COVERED ITEM OR SERVICE: HCPCS | Performed by: PHYSICAL MEDICINE & REHABILITATION

## 2019-01-11 PROCEDURE — 99239 HOSP IP/OBS DSCHRG MGMT >30: CPT | Performed by: PHYSICAL MEDICINE & REHABILITATION

## 2019-01-11 RX ADMIN — NYSTATIN: 100000 CREAM TOPICAL at 08:44

## 2019-01-11 RX ADMIN — SERTRALINE HYDROCHLORIDE 100 MG: 50 TABLET ORAL at 08:43

## 2019-01-11 RX ADMIN — VITAMIN D, TAB 1000IU (100/BT) 1000 UNITS: 25 TAB at 08:42

## 2019-01-11 RX ADMIN — BUSPIRONE HYDROCHLORIDE 15 MG: 15 TABLET ORAL at 14:24

## 2019-01-11 RX ADMIN — DIVALPROEX SODIUM 500 MG: 500 TABLET, DELAYED RELEASE ORAL at 08:43

## 2019-01-11 RX ADMIN — LEVOTHYROXINE SODIUM 112 MCG: 112 TABLET ORAL at 05:32

## 2019-01-11 RX ADMIN — BUSPIRONE HYDROCHLORIDE 15 MG: 15 TABLET ORAL at 08:44

## 2019-01-11 RX ADMIN — LEVOTHYROXINE SODIUM 25 MCG: 25 TABLET ORAL at 05:31

## 2019-01-11 ASSESSMENT — PAIN SCALES - GENERAL
PAINLEVEL_OUTOF10: 0
PAINLEVEL_OUTOF10: 0

## 2019-01-11 NOTE — REHAB-ACTIVITY IDT TEAM NOTE
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      He has been showing improvement in his right hand and has been using it for the full time in each session. 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

## 2019-01-11 NOTE — DISCHARGE INSTRUCTIONS
[facility] NURSING DISCHARGE INSTRUCTIONS    Blood Pressure: 127/64  Weight: (!) 130.5 kg (287 lb 11.2 oz)  Nursing recommendations for Norm Prabhakar at time of discharge are as follows:  Client and Family Member verbalized understanding of all discharge instructions and prescriptions.     Review all your home medications and newly ordered medications with your doctor and/or pharmacist. Follow medication instructions as directed by your doctor and/or pharmacist.    Pain Management:   Discharge Pain Medication Instructions:  Comfort Goal: Comfort at Rest, Comfort with Movement, Perform Activity  Notify your primary care provider if pain is unrelieved with these measures, if the pain is new, or increased in intensity.    Discharge Skin Characteristics: Warm, Dry  Discharge Skin Exam:       Skin / Wound Care Instructions: Please contact your primary care physician for any change in skin integrity.     If You Have Surgical Incisions / Wounds:  Monitor surgical site(s) for signs of increased swelling, redness or symptoms of drainage from the site or fever as this could indicate signs and symptoms of infection. If these symptoms are noted, notifiy your primary care provider.      Discharge Safety Instructions: Should Have ADULT SUPERVISION     Discharge Safety Concerns: Impaired Judgement, Weakness  The interdisciplinary team has made recommendation that you should have adult supervision in the house due to impaired judgment and weakness  Anti-embolic stockings are required during the day and off at night to increase circulation to the lower extremities.    Discharge Diet: regular     Discharge Liquids: thin  Discharge Bowel Function: Continent  Please contact your primary care physician for any changes in bowel habits.  Discharge Bowel Program:    Discharge Bladder Function: Continent  Discharge Urinary Devices: Other (Comments) (urinal)      Nursing Discharge Plan:   Smoking Cessation Offered:  Patient Counseled  Influenza Vaccine Indication: Not indicated: Previously immunized this influenza season and > 8 years of age    Conversion Disorder  Introduction  Conversion disorder is also known as functional neurological symptom disorder. People with this mental disorder have symptoms that suggest a brain or nervous system condition, such as a stroke or seizure. However, no such condition can be found to explain the symptoms. For people with conversion disorder, the symptoms may result in:  · Severe distress or anxiety.  · Disruption of relationships or other normal life activities.  · Medical evaluation. This often occurs in the primary care or emergency room setting.  Conversion disorder may begin anytime from late childhood through the young adult years. This disorder is more common in females than males.  What are the causes?  The exact cause of this disorder is unknown. It is often triggered by stressful life events involving personal relationships.  What are the signs or symptoms?  Signs and symptoms of conversion disorder may include:  · Muscle weakness or paralysis. If this involves the bladder muscle, it can cause difficulty urinating.  · Seizures or attacks of being unresponsive.  · Abnormal movement, such as tremors, jerks, muscle spasms, loss of balance, or difficulty walking.  · Difficulty swallowing or a feeling of having a lump in the throat.  · Loss of speech (aphonia) or slurring of words (dysarthria).  · Loss of the sensation of touch or pain.  · Changes in vision, such as blindness or double vision.  · Seeing things that are not there (hallucinations).  · Changes in hearing, such as deafness.  Symptoms usually occur suddenly but may increase gradually over time. They usually go away completely in a short time. Seizures and tremors are more likely than other conversion symptoms to come back or continue.  How is this diagnosed?  Diagnosis of this disorder starts with an evaluation by your  health care provider. Exams and tests will be done to rule out serious physical health problems. The evaluation will vary depending on your specific symptoms. It may include:  · A physical exam, including a neurological exam.  · Lab tests on blood or urine samples.  · X-rays or other imaging studies.  · An electroencephalogram (EEG) to monitor brain waves for seizure activity.  Your health care provider may refer you to a mental health specialist for psychological evaluation.  How is this treated?  The main goal of treatment is to get the symptoms to go away as quickly as possible. Most people with this disorder respond well to relaxation techniques and reassurance from their health care provider that the symptoms are stress related. For people with symptoms that do not go away, the following treatment options are available:  · Counseling or talk therapy. Talk therapy is provided by mental health specialists. It involves discussing stressful life events that may have triggered the symptoms and ways to cope with these issues.  · Hypnotherapy. This is the use of hypnosis to help a person overcome conversion symptoms. It is provided by mental health specialists.  · Amobarbital interview. This involves discussing stressful life events that may have triggered the symptoms. The mental health specialist asks you questions after you take a short-acting medicine (amobarbital) that produces a hypnotic state.  · Medicine. Antidepressant medicine may be helpful even if a person with conversion symptoms is not depressed.  · Physical therapy. Physical therapy can help maintain muscle strength in cases of long-term paralysis.  Follow these instructions at home:  · Take medicines only as directed by your health care provider.  · Try to reduce stress.  · Keep all follow-up visits as directed by your health care provider. This is important.  Contact a health care provider if:  · Your symptoms do not go away or they become  severe.  · You develop new symptoms.  Get help right away if:  You have serious thoughts about hurting yourself or someone else.  This information is not intended to replace advice given to you by your health care provider. Make sure you discuss any questions you have with your health care provider.  Document Released: 01/20/2012 Document Revised: 05/25/2017 Document Reviewed: 04/30/2015  © 2017 Elsevier      Seizure, Adult  When you have a seizure:  · Parts of your body may move.  · How aware or awake (conscious) you are may change.  · You may shake (convulse).  Some people have symptoms right before a seizure happens. These symptoms may include:  · Fear.  · Worry (anxiety).  · Feeling like you are going to throw up (nausea).  · Feeling like the room is spinning (vertigo).  · Feeling like you saw or heard something before (jojo vu).  · Odd tastes or smells.  · Changes in vision, such as seeing flashing lights or spots.  Seizures usually last from 30 seconds to 2 minutes. Usually, they are not harmful unless they last a long time.  Follow these instructions at home:  Medicines  · Take over-the-counter and prescription medicines only as told by your doctor.  · Avoid anything that may keep your medicine from working, such as alcohol.  Activity  · Do not do any activities that would be dangerous if you had another seizure, like driving or swimming. Wait until your doctor approves.  · If you live in the [place] ask your local DMV (department of [place].com) when you can drive.  · Rest.  Teaching others  · Teach friends and family what to do when you have a seizure. They should:  ¨ Lay you on the ground.  ¨ Protect your head and body.  ¨ Loosen any tight clothing around your neck.  ¨ Turn you on your side.  ¨ Stay with you until you are better.  ¨ Not hold you down.  ¨ Not put anything in your mouth.  ¨ Know whether or not you need emergency care.  General instructions  · Contact your doctor each time you have a  seizure.  · Avoid anything that gives you seizures.  · Keep a seizure diary. Write down:  ¨ What you think caused each seizure.  ¨ What you remember about each seizure.  · Keep all follow-up visits as told by your doctor. This is important.  Contact a doctor if:  · You have another seizure.  · You have seizures more often.  · There is any change in what happens during your seizures.  · You continue to have seizures with treatment.  · You have symptoms of being sick or having an infection.  Get help right away if:  · You have a seizure:  ¨ That lasts longer than 5 minutes.  ¨ That is different than seizures you had before.  ¨ That makes it harder to breathe.  ¨ After you hurt your head.  · After a seizure, you cannot speak or use a part of your body.  · After a seizure, you are confused or have a bad headache.  · You have two or more seizures in a row.  · You are having seizures more often.  · You do not wake up right after a seizure.  · You get hurt during a seizure.  In an emergency:  · These symptoms may be an emergency. Do not wait to see if the symptoms will go away. Get medical help right away. Call your local emergency services (911 in the [place] Do not drive yourself to the hospital.  This information is not intended to replace advice given to you by your health care provider. Make sure you discuss any questions you have with your health care provider.  Document Released: 06/05/2009 Document Revised: 08/30/2017 Document Reviewed: 08/30/2017  Elsevier Interactive Patient Education © 2017 [employer] Inc.        Case Management Discharge Instructions:   Discharge Location: Assisted Living  Agency Name/Address/Phone: [facility] [address] [address]   [phone]  Home Health:    Outpatient Services:    DME Provider/Phone: [facility]  [address] [place] [address]  [place] [id number] [phone]  Medical Equipment Ordered: Front Wheel Walker  Prescription Faxed to:        Discharge  Medication Instructions:  Below are the medications your physician expects you to take upon discharge:Occupational Therapy Discharge Instructions for Norm Prabhakar    1/10/2019    Level of Assist Required for Eating: Able to Complete Eating without Assist  Level of Assist Required for Grooming: Able to Complete Grooming without Assist  Level of Assist Required for Dressing: Able to Complete Dressing without Assist  Level of Assist Required for Toileting: Able to Complete Toileting without Assist  Level of Assist Required for Toilet Transfer: Able to Complete Toilet Transfer without Assist  Level of Assist Required for Bathing: Able to Complete Bathing without Assist  Level of Assist Required for Shower Transfer: Requires Supervision with Shower Transfer (Have someone with you the first few times getting in / out of the shower )  Level of Assist Required for Home Mgmt: Requires Supervision with Home Management    Physical Therapy Discharge Instructions for Norm Prabhakar    1/10/2019    Level of Assist Required for Ambulation: Supervision on Flat Surfaces, Supervision on Curbs, Supervision on Stairs  Distance Patient May Ambulate: Up to 200 feet or more as tolerated  Device Recommended for Ambulation: Front-Wheeled Walker  Level of Assist Required to Propel Wheelchair: Requires No Assist  Level of Assist Required for Transfers: Supervision  Device Recommended for Transfers: Front-Wheeled Walker  Home Exercise Program: Refer to Home Exercise Program Handout for Details    It was great working with you Enoc!  Take care and best wishes for the future!  Nannette Randhawa PT DPT/Brook [place]ee PT DPT    Speech Therapy Discharge Instructions for Norm Prabhakar    1/10/2019    Supervision: Recommend supervision for medication and financial management.    Cognition / Communication: Allow and budget extra time to get tasks done.  When you are about to try a task that you haven't done in a while (or struggled with the last  time), think about the steps involved, try to anticipate possible challenges and identify what might give you trouble, come up with a plan to compensate of those challenges,  and  evaluate after the fact - to determine if your plan worked or if it needs adjustment. If you experience an outcome that you didn't expect, think back to what may have contributed to the problem. Break complex problems down into smaller parts.   Sometimes a a complicated task can be overwhelming, but if you break it down into components, you will be able to find a place where you can cope with the information.   Be patient and persistent.  Develop tools and strategies that will help organize, filter and narrow down information so that you can deal with it effectively.    It has been a pleasure to work with you.  -- Lulu Leija M.S. CCC-SLP      Fall Prevention in the Home  Falls can cause injuries and can affect people from all age groups. There are many simple things that you can do to make your home safe and to help prevent falls.  What can I do on the outside of my home?  · Regularly repair the edges of walkways and driveways and fix any cracks.  · Remove high doorway thresholds.  · Trim any shrubbery on the main path into your home.  · Use bright outdoor lighting.  · Clear walkways of debris and clutter, including tools and rocks.  · Regularly check that handrails are securely fastened and in good repair. Both sides of any steps should have handrails.  · Install guardrails along the edges of any raised decks or porches.  · Have leaves, snow, and ice cleared regularly.  · Use sand or salt on walkways during winter months.  · In the garage, clean up any spills right away, including grease or oil spills.  What can I do in the bathroom?  · Use night lights.  · Install grab bars by the toilet and in the tub and shower. Do not use towel bars as grab bars.  · Use non-skid mats or decals on the floor of the tub or shower.  · If you need to sit  down while you are in the shower, use a plastic, non-slip stool.  · Keep the floor dry. Immediately clean up any water that spills on the floor.  · Remove soap buildup in the tub or shower on a regular basis.  · Attach bath mats securely with double-sided non-slip rug tape.  · Remove throw rugs and other tripping hazards from the floor.  What can I do in the bedroom?  · Use night lights.  · Make sure that a bedside light is easy to reach.  · Do not use oversized bedding that drapes onto the floor.  · Have a firm chair that has side arms to use for getting dressed.  · Remove throw rugs and other tripping hazards from the floor.  What can I do in the kitchen?  · Clean up any spills right away.  · Avoid walking on wet floors.  · Place frequently used items in easy-to-reach places.  · If you need to reach for something above you, use a sturdy step stool that has a grab bar.  · Keep electrical cables out of the way.  · Do not use floor polish or wax that makes floors slippery. If you have to use wax, make sure that it is non-skid floor wax.  · Remove throw rugs and other tripping hazards from the floor.  What can I do in the stairways?  · Do not leave any items on the stairs.  · Make sure that there are handrails on both sides of the stairs. Fix handrails that are broken or loose. Make sure that handrails are as long as the stairways.  · Check any carpeting to make sure that it is firmly attached to the stairs. Fix any carpet that is loose or worn.  · Avoid having throw rugs at the top or bottom of stairways, or secure the rugs with carpet tape to prevent them from moving.  · Make sure that you have a light switch at the top of the stairs and the bottom of the stairs. If you do not have them, have them installed.  What are some other fall prevention tips?  · Wear closed-toe shoes that fit well and support your feet. Wear shoes that have rubber soles or low heels.  · When you use a stepladder, make sure that it is  completely opened and that the sides are firmly locked. Have someone hold the ladder while you are using it. Do not climb a closed stepladder.  · Add color or contrast paint or tape to grab bars and handrails in your home. Place contrasting color strips on the first and last steps.  · Use mobility aids as needed, such as canes, walkers, scooters, and crutches.  · Turn on lights if it is dark. Replace any light bulbs that burn out.  · Set up furniture so that there are clear paths. Keep the furniture in the same spot.  · Fix any uneven floor surfaces.  · Choose a carpet design that does not hide the edge of steps of a stairway.  · Be aware of any and all pets.  · Review your medicines with your healthcare provider. Some medicines can cause dizziness or changes in blood pressure, which increase your risk of falling.  Talk with your health care provider about other ways that you can decrease your risk of falls. This may include working with a physical therapist or  to improve your strength, balance, and endurance.  This information is not intended to replace advice given to you by your health care provider. Make sure you discuss any questions you have with your health care provider.  Document Released: 12/08/2003 Document Revised: 05/16/2017 Document Reviewed: 01/22/2016  [employer] Interactive Patient Education © 2017 [employer]    Suicidal Feelings: How to Help Yourself  Suicide is the taking of one's own life. If you feel as though life is getting too tough to handle and are thinking about suicide, get help right away. To get help:  · Call your local emergency services (911 in the [place]  · Call a suicide hotline to speak with a trained counselor who understands how you are feeling. The following is a list of suicide hotlines in the [place]. For a list of hotlines in [place], visit www.suicide.org/hotlines/international/plnxhz-tmlxeaw-ycjeuvup.html.  ¨ 1-886-288-TALK (5-212-005-3145).  ¨ 3-966-YRXWNXJ  (1-540.898.5786).  ¨ 1-300.698.8671. This is a hotline for Persian speakers.  ¨ 1-864-665-4TTY (1-459.418.2822). This is a hotline for TTY users.  ¨ 3-122-7-U-HAL (1-521.395.9877). This is a hotline for lesbian, stanton, bisexual, transgender, or questioning youth.  · Contact a crisis center or a local suicide prevention center. To find a crisis center or suicide prevention center:  ¨ Call your local hospital, clinic, community service organization, mental health center, social service provider, or health department. Ask for assistance in connecting to a crisis center.  ¨ Visit www.suicidepreventionlifeline.org/getinvolved/ for a list of crisis centers in the [place], or visit www.suicideprevention.ca/dceanwod-apvpz-nrnsaym/find-a-crisis-centre for a list of centers in [place].  · Visit the following websites:  ¨ National Suicide Prevention Lifeline: www.suicidepreventionlifeline.org  ¨ Hopeline: www.hopeline.Cabara  ¨ American Foundation for Suicide Prevention: www.afsp.org  ¨ The Hal Project (for lesbian, stanton, bisexual, transgender, or questioning youth): www.thetrevorproject.org  How can I help myself feel better?  · Promise yourself that you will not do anything drastic when you have suicidal feelings. Remember, there is hope. Many people have gotten through suicidal thoughts and feelings, and you will, too. You may have gotten through them before, and this proves that you can get through them again.  · Let family, friends, teachers, or counselors know how you are feeling. Try not to isolate yourself from those who care about you. Remember, they will want to help you. Talk with someone every day, even if you do not feel sociable. Face-to-face conversation is best.  · Call a mental health professional and see one regularly.  · Visit your primary health care provider every year.  · Eat a well-balanced diet, and space your meals so you eat regularly.  · Get plenty of rest.  · Avoid alcohol and drugs, and  remove them from your home. They will only make you feel worse.  · If you are thinking of taking a lot of medicine, give your medicine to someone who can give it to you one day at a time. If you are on antidepressants and are concerned you will overdose, let your health care provider know so he or she can give you safer medicines. Ask your mental health professional about the possible side effects of any medicines you are taking.  · Remove weapons, poisons, knives, and anything else that could harm you from your home.  · Try to stick to routines. Follow a schedule every day. Put self-care on your schedule.  · Make a list of realistic goals, and cross them off when you achieve them. Accomplishments give a sense of worth.  · Wait until you are feeling better before doing the things you find difficult or unpleasant.  · Exercise if you are able. You will feel better if you exercise for even a half hour each day.  · Go out in the sun or into nature. This will help you recover from depression faster. If you have a favorite place to walk, go there.  · Do the things that have always given you pleasure. Play your favorite music, read a good book, paint a picture, play your favorite instrument, or do anything else that takes your mind off your depression if it is safe to do.  · Keep your living space well lit.  · When you are feeling well, write yourself a letter about tips and support that you can read when you are not feeling well.  · Remember that life’s difficulties can be sorted out with help. Conditions can be treated. You can work on thoughts and strategies that serve you well.  This information is not intended to replace advice given to you by your health care provider. Make sure you discuss any questions you have with your health care provider.  Document Released: 06/23/2004 Document Revised: 08/16/2017 Document Reviewed: 04/14/2015  [employer] Interactive Patient Education © 2017 [employer]        Type 2 Diabetes  Mellitus, Self Care, Adult  When you have type 2 diabetes (type 2 diabetes mellitus), you must keep your blood sugar (glucose) under control. You can do this with:  · Nutrition.  · Exercise.  · Lifestyle changes.  · Medicines or insulin, if needed.  · Support from your doctors and others.  How do I manage my blood sugar?  · Check your blood sugar level every day, as often as told.  · Call your doctor if your blood sugar is above your goal numbers for 2 tests in a row.  · Have your A1c (hemoglobin A1c) level checked at least two times a year. Have it checked more often if your doctor tells you to.  Your doctor will set treatment goals for you. Generally, you should have these blood sugar levels:  · Before meals (preprandial):  mg/dL (4.4-7.2 mmol/L).  · After meals (postprandial): lower than 180 mg/dL (10 mmol/L).  · A1c level: less than 7%.  What do I need to know about high blood sugar?  High blood sugar is called hyperglycemia. Know the signs of high blood sugar. Signs may include:  · Feeling:  ¨ Thirsty.  ¨ Hungry.  ¨ Very tired.  · Needing to pee (urinate) more than usual.  · Blurry vision.  What do I need to know about low blood sugar?  Low blood sugar is called hypoglycemia. This is when blood sugar is at or below 70 mg/dL (3.9 mmol/L). Symptoms may include:  · Feeling:  ¨ Hungry.  ¨ Worried or nervous (anxious).  ¨ Sweaty and clammy.  ¨ Confused.  ¨ Dizzy.  ¨ Sleepy.  ¨ Sick to your stomach (nauseous).  · Having:  ¨ A fast heartbeat (palpitations).  ¨ A headache.  ¨ A change in your vision.  ¨ Jerky movements that you cannot control (seizure).  ¨ Nightmares.  ¨ Tingling or no feeling (numbness) around the mouth, lips, or tongue.  · Having trouble with:  ¨ Talking.  ¨ Paying attention (concentrating).  ¨ Moving (coordination).  ¨ Sleeping.  · Shaking.  · Passing out (fainting).  · Getting upset easily (irritability).  Treating low blood sugar   To treat low blood sugar, eat or drink something sugary  right away. If you can think clearly and swallow safely, follow the 15:15 rule:  · Take 15 grams of a fast-acting carb (carbohydrate). Some fast-acting carbs are:  ¨ 1 tube of glucose gel.  ¨ 3 sugar tablets (glucose pills).  ¨ 6-8 pieces of hard candy.  ¨ 4 oz (120 mL) of fruit juice.  ¨ 4 oz (120 mL) regular (not diet) soda.  · Check your blood sugar 15 minutes after you take the carb.  · If your blood sugar is still at or below 70 mg/dL (3.9 mmol/L), take 15 grams of a carb again.  · If your blood sugar does not go above 70 mg/dL (3.9 mmol/L) after 3 tries, get help right away.  · After your blood sugar goes back to normal, eat a meal or a snack within 1 hour.  Treating very low blood sugar   If your blood sugar is at or below 54 mg/dL (3 mmol/L), you have very low blood sugar (severe hypoglycemia). This is an emergency. Do not wait to see if the symptoms will go away. Get medical help right away. Call your local emergency services (911 in the [place] Do not drive yourself to the hospital.  If you have very low blood sugar and you cannot eat or drink, you may need a glucagon shot (injection). A family member or friend should learn how to check your blood sugar and how to give you a glucagon shot. Ask your doctor if you need to have a glucagon shot kit at home.  What else is important to manage my diabetes?  Medicine   Follow these instructions about insulin and diabetes medicines:  · Take them as told by your doctor.  · Adjust them as told by your doctor.  · Do not run out of them.  Having diabetes can raise your risk for other long-term conditions. These include heart or kidney disease. Your doctor may prescribe medicines to help prevent problems from diabetes.  Food   · Make healthy food choices. These include:  ¨ Chicken, fish, egg whites, and beans.  ¨ Oats, whole wheat, bulgur, brown rice, quinoa, and millet.  ¨ Fresh fruits and vegetables.  ¨ Low-fat dairy products.  ¨ Nuts, avocado, olive oil, and canola  oil.  · Make a food plan with a specialist (dietitian).  · Follow instructions from your doctor about what you cannot eat or drink.  · Drink enough fluid to keep your pee (urine) clear or pale yellow.  · Eat healthy snacks between healthy meals.  · Keep track of carbs that you eat. Read food labels. Learn food serving sizes.  · Follow your sick day plan when you cannot eat or drink normally. Make this plan with your doctor so it is ready to use.  Activity  · Exercise at least 3 times a week.  · Do not go more than 2 days without exercising.  · Talk with your doctor before you start a new exercise. Your doctor may need to adjust your insulin, medicines, or food.  Lifestyle   · Do not use any tobacco products. These include cigarettes, chewing tobacco, and e-cigarettes.If you need help quitting, ask your doctor.  · Ask your doctor how much alcohol is safe for you.  · Learn to deal with stress. If you need help with this, ask your doctor.  Body care  · Stay up to date with your shots (immunizations).  · Have your eyes and feet checked by a doctor as often as told.  · Check your skin and feet every day. Check for cuts, bruises, redness, blisters, or sores.  · Brush your teeth and gums two times a day.  · Floss at least one time a day.  · Go to the dentist least one time every 6 months.  · Stay at a healthy weight.  General instructions   · Take over-the-counter and prescription medicines only as told by your doctor.  · Share your diabetes care plan with:  ¨ Your work or school.  ¨ People you live with.  · Check your pee (urine) for ketones:  ¨ When you are sick.  ¨ As told by your doctor.  · Carry a card or wear jewelry that says that you have diabetes.  · Ask your doctor:  ¨ Do I need to meet with a diabetes educator?  ¨ Where can I find a support group for people with diabetes?  · Keep all follow-up visits as told by your doctor. This is important.  Where to find more information:  To learn more about diabetes,  visit:  · American Diabetes Association: www.diabetes.org  · American Association of Diabetes Educators: www.diabeteseducator.org/patient-resources  This information is not intended to replace advice given to you by your health care provider. Make sure you discuss any questions you have with your health care provider.  Document Released: 04/10/2017 Document Revised: 05/25/2017 Document Reviewed: 01/20/2017  [employer] Interactive Patient Education © 2017 [employer]        Atorvastatin tablets  What is this medicine?  ATORVASTATIN (a TORE va sta tin) is known as a HMG-CoA reductase inhibitor or 'statin'. It lowers the level of cholesterol and triglycerides in the blood. This drug may also reduce the risk of heart attack, stroke, or other health problems in patients with risk factors for heart disease. Diet and lifestyle changes are often used with this drug.  This medicine may be used for other purposes; ask your health care provider or pharmacist if you have questions.  COMMON BRAND NAME(S): Lipitor  What should I tell my health care provider before I take this medicine?  They need to know if you have any of these conditions:  -frequently drink alcoholic beverages  -history of stroke, TIA  -kidney disease  -liver disease  -muscle aches or weakness  -other medical condition  -an unusual or allergic reaction to atorvastatin, other medicines, foods, dyes, or preservatives  -pregnant or trying to get pregnant  -breast-feeding  How should I use this medicine?  Take this medicine by mouth with a glass of water. Follow the directions on the prescription label. You can take this medicine with or without food. Take your doses at regular intervals. Do not take your medicine more often than directed.  Talk to your pediatrician regarding the use of this medicine in children. While this drug may be prescribed for children as young as 10 years old for selected conditions, precautions do apply.  Overdosage: If you think you have  taken too much of this medicine contact a poison control center or emergency room at once.  NOTE: This medicine is only for you. Do not share this medicine with others.  What if I miss a dose?  If you miss a dose, take it as soon as you can. If it is almost time for your next dose, take only that dose. Do not take double or extra doses.  What may interact with this medicine?  Do not take this medicine with any of the following medications:  -red yeast rice  -telaprevir  -telithromycin  -voriconazole  This medicine may also interact with the following medications:  -alcohol  -antiviral medicines for HIV or AIDS  -boceprevir  -certain antibiotics like clarithromycin, erythromycin, troleandomycin  -certain medicines for cholesterol like fenofibrate or gemfibrozil  -cimetidine  -clarithromycin  -colchicine  -cyclosporine  -digoxin  -female hormones, like estrogens or progestins and birth control pills  -grapefruit juice  -medicines for fungal infections like fluconazole, itraconazole, ketoconazole  -niacin  -rifampin  -spironolactone  This list may not describe all possible interactions. Give your health care provider a list of all the medicines, herbs, non-prescription drugs, or dietary supplements you use. Also tell them if you smoke, drink alcohol, or use illegal drugs. Some items may interact with your medicine.  What should I watch for while using this medicine?  Visit your doctor or health care professional for regular check-ups. You may need regular tests to make sure your liver is working properly.  Tell your doctor or health care professional right away if you get any unexplained muscle pain, tenderness, or weakness, especially if you also have a fever and tiredness. Your doctor or health care professional may tell you to stop taking this medicine if you develop muscle problems. If your muscle problems do not go away after stopping this medicine, contact your health care professional.  This drug is only part of  a total heart-health program. Your doctor or a dietician can suggest a low-cholesterol and low-fat diet to help. Avoid alcohol and smoking, and keep a proper exercise schedule.  Do not use this drug if you are pregnant or breast-feeding. Serious side effects to an unborn child or to an infant are possible. Talk to your doctor or pharmacist for more information.  This medicine may affect blood sugar levels. If you have diabetes, check with your doctor or health care professional before you change your diet or the dose of your diabetic medicine.  If you are going to have surgery tell your health care professional that you are taking this drug.  What side effects may I notice from receiving this medicine?  Side effects that you should report to your doctor or health care professional as soon as possible:  -allergic reactions like skin rash, itching or hives, swelling of the face, lips, or tongue  -dark urine  -fever  -joint pain  -muscle cramps, pain  -redness, blistering, peeling or loosening of the skin, including inside the mouth  -trouble passing urine or change in the amount of urine  -unusually weak or tired  -yellowing of eyes or skin  Side effects that usually do not require medical attention (report to your doctor or health care professional if they continue or are bothersome):  -constipation  -heartburn  -stomach gas, pain, upset  This list may not describe all possible side effects. Call your doctor for medical advice about side effects. You may report side effects to FDA at 1-184-FDA-3397.  Where should I keep my medicine?  Keep out of the reach of children.  Store at room temperature between 20 to 25 degrees C (68 to 77 degrees F). Throw away any unused medicine after the expiration date.  NOTE: This sheet is a summary. It may not cover all possible information. If you have questions about this medicine, talk to your doctor, pharmacist, or health care provider.  © 2018 [employer]/Gold Standard (2012-11-06  09:18:24)      Prazosin capsules  What is this medicine?  PRAZOSIN (PRA sierra sin) is an antihypertensive. It works by relaxing the blood vessels. It is used to treat high blood pressure.  This medicine may be used for other purposes; ask your health care provider or pharmacist if you have questions.  COMMON BRAND NAME(S): Minipress  What should I tell my health care provider before I take this medicine?  They need to know if you have any of the following conditions:  -kidney disease  -an unusual or allergic reaction to prazosin, other medicines, foods, dyes, or preservatives  -pregnant or trying to get pregnant  -breast-feeding  How should I use this medicine?  Take this medicine by mouth with a glass of water. Follow the directions on the prescription label. Take your doses at regular intervals. Do not take your medicine more often than directed. Do not stop taking except on the advice of your doctor or health care professional.  Talk to your pediatrician regarding the use of this medicine in children. Special care may be needed.  Overdosage: If you think you have taken too much of this medicine contact a poison control center or emergency room at once.  NOTE: This medicine is only for you. Do not share this medicine with others.  What if I miss a dose?  If you miss a dose, take it as soon as you can. If it is almost time for your next dose, take only that dose. Do not take double or extra doses.  What may interact with this medicine?  -diuretics  -medicines for high blood pressure  -sildenafil  -tadalafil  -vardenafil  This list may not describe all possible interactions. Give your health care provider a list of all the medicines, herbs, non-prescription drugs, or dietary supplements you use. Also tell them if you smoke, drink alcohol, or use illegal drugs. Some items may interact with your medicine.  What should I watch for while using this medicine?  Visit your doctor or health care professional for regular  checks on your progress. Check your blood pressure regularly. Ask your doctor or health care professional what your blood pressure should be and when you should contact him or her.  Drowsiness and dizziness are more likely to occur after the first dose, after an increase in dose, or during hot weather or exercise. These effects can decrease once your body adjusts to this medicine. Do not drive, use machinery, or do anything that needs mental alertness until you know how this drug affects you. Do not stand or sit up quickly, especially if you are an older patient. This reduces the risk of dizzy or fainting spells. Alcohol can make you more drowsy and dizzy. Avoid alcoholic drinks.  Do not treat yourself for coughs, colds or allergies without asking your doctor or health care professional for advice. Some ingredients can increase your blood pressure.  Your mouth may get dry. Chewing sugarless gum or sucking hard candy, and drinking plenty of water may help. Contact your doctor if the problem does not go away or is severe.  For males, contact your doctor or health care professional right away if you have an erection that lasts longer than 4 hours or if it becomes painful. This may be a sign of a serious problem and must be treated right away to prevent permanent damage.  What side effects may I notice from receiving this medicine?  Side effects that you should report to your doctor or health care professional as soon as possible:  -blurred vision  -difficulty breathing, shortness of breath  -fainting spells, light headedness  -fast or irregular heartbeat, palpitations or chest pain  -numbness in hands or feet  -prolonged painful erection of the penis  -swelling of the legs or ankles  -unusually weak or tired  Side effects that usually do not require medical attention (report to your doctor or health care professional if they continue or are bothersome):  -constipation or diarrhea  -headache  -nausea  -sexual  difficulties  -stomach pain  This list may not describe all possible side effects. Call your doctor for medical advice about side effects. You may report side effects to FDA at 4-969-DTT-9760.  Where should I keep my medicine?  Keep out of the reach of children.  Store at room temperature between 15 and 30 degrees C (59 and 86 degrees F). Protect from light. Keep container tightly closed. Throw away any unused medicine after the expiration date.  NOTE: This sheet is a summary. It may not cover all possible information. If you have questions about this medicine, talk to your doctor, pharmacist, or health care provider.  © 2018 [employer]Gold Standard (2015-06-15 09:13:50)    Buspirone tablets  What is this medicine?  BUSPIRONE (byoo SUPRIYAYE suzanne) is used to treat anxiety disorders.  This medicine may be used for other purposes; ask your health care provider or pharmacist if you have questions.  COMMON BRAND NAME(S): BuSpar  What should I tell my health care provider before I take this medicine?  They need to know if you have any of these conditions:  -kidney or liver disease  -an unusual or allergic reaction to buspirone, other medicines, foods, dyes, or preservatives  -pregnant or trying to get pregnant  -breast-feeding  How should I use this medicine?  Take this medicine by mouth with a glass of water. Follow the directions on the prescription label. You may take this medicine with or without food. To ensure that this medicine always works the same way for you, you should take it either always with or always without food. Take your doses at regular intervals. Do not take your medicine more often than directed. Do not stop taking except on the advice of your doctor or health care professional.  Talk to your pediatrician regarding the use of this medicine in children. Special care may be needed.  Overdosage: If you think you have taken too much of this medicine contact a poison control center or emergency room at  once.  NOTE: This medicine is only for you. Do not share this medicine with others.  What if I miss a dose?  If you miss a dose, take it as soon as you can. If it is almost time for your next dose, take only that dose. Do not take double or extra doses.  What may interact with this medicine?  Do not take this medicine with any of the following medications:  -linezolid  -MAOIs like Carbex, Eldepryl, Marplan, Nardil, and Parnate  -methylene blue  -procarbazine  This medicine may also interact with the following medications:  -diazepam  -digoxin  -diltiazem  -erythromycin  -grapefruit juice  -haloperidol  -medicines for mental depression or mood problems  -medicines for seizures like carbamazepine, phenobarbital and phenytoin  -nefazodone  -other medications for anxiety  -rifampin  -ritonavir  -some antifungal medicines like itraconazole, ketoconazole, and voriconazole  -verapamil  -warfarin  This list may not describe all possible interactions. Give your health care provider a list of all the medicines, herbs, non-prescription drugs, or dietary supplements you use. Also tell them if you smoke, drink alcohol, or use illegal drugs. Some items may interact with your medicine.  What should I watch for while using this medicine?  Visit your doctor or health care professional for regular checks on your progress. It may take 1 to 2 weeks before your anxiety gets better.  You may get drowsy or dizzy. Do not drive, use machinery, or do anything that needs mental alertness until you know how this drug affects you. Do not stand or sit up quickly, especially if you are an older patient. This reduces the risk of dizzy or fainting spells. Alcohol can make you more drowsy and dizzy. Avoid alcoholic drinks.  What side effects may I notice from receiving this medicine?  Side effects that you should report to your doctor or health care professional as soon as possible:  -blurred vision or other vision changes  -chest  pain  -confusion  -difficulty breathing  -feelings of hostility or anger  -muscle aches and pains  -numbness or tingling in hands or feet  -ringing in the ears  -skin rash and itching  -vomiting  -weakness  Side effects that usually do not require medical attention (report to your doctor or health care professional if they continue or are bothersome):  -disturbed dreams, nightmares  -headache  -nausea  -restlessness or nervousness  -sore throat and nasal congestion  -stomach upset  This list may not describe all possible side effects. Call your doctor for medical advice about side effects. You may report side effects to FDA at 8-762-FDA-7409.  Where should I keep my medicine?  Keep out of the reach of children.  Store at room temperature below 30 degrees C (86 degrees F). Protect from light. Keep container tightly closed. Throw away any unused medicine after the expiration date.  NOTE: This sheet is a summary. It may not cover all possible information. If you have questions about this medicine, talk to your doctor, pharmacist, or health care provider.  © 2018 [employer]/Gold Standard (2011-07-28 18:06:11)      Valproic Acid, Divalproex Sodium delayed or extended-release tablets  What is this medicine?  DIVALPROEX SODIUM (dye CARLOS A pro ex SO christy um) is used to prevent seizures caused by some forms of epilepsy. It is also used to treat bipolar dangelo and to prevent migraine headaches.  This medicine may be used for other purposes; ask your health care provider or pharmacist if you have questions.  COMMON BRAND NAME(S): Depakote, Depakote ER  What should I tell my health care provider before I take this medicine?  They need to know if you have any of these conditions:  -if you often drink alcohol  -kidney disease  -liver disease  -low platelet counts  -mitochondrial disease  -suicidal thoughts, plans, or attempt; a previous suicide attempt by you or a family member  -urea cycle disorder (UCD)  -an unusual or allergic  reaction to divalproex sodium, sodium valproate, valproic acid, other medicines, foods, dyes, or preservatives  -pregnant or trying to get pregnant  -breast-feeding  How should I use this medicine?  Take this medicine by mouth with a drink of water. Follow the directions on the prescription label. Do not cut, crush or chew this medicine. You can take it with or without food. If it upsets your stomach, take it with food. Take your medicine at regular intervals. Do not take it more often than directed. Do not stop taking except on your doctor's advice.  A special MedGuide will be given to you by the pharmacist with each prescription and refill. Be sure to read this information carefully each time.  Talk to your pediatrician regarding the use of this medicine in children. While this drug may be prescribed for children as young as 10 years for selected conditions, precautions do apply.  Overdosage: If you think you have taken too much of this medicine contact a poison control center or emergency room at once.  NOTE: This medicine is only for you. Do not share this medicine with others.  What if I miss a dose?  If you miss a dose, take it as soon as you can. If it is almost time for your next dose, take only that dose. Do not take double or extra doses.  What may interact with this medicine?  Do not take this medicine with any of the following medications:  -sodium phenylbutyrate  This medicine may also interact with the following medications:  -aspirin  -certain antibiotics like ertapenem, imipenem, meropenem  -certain medicines for depression, anxiety, or psychotic disturbances  -certain medicines for seizures like carbamazepine, clonazepam, diazepam, ethosuximide, felbamate, lamotrigine, phenobarbital, phenytoin, primidone, rufinamide, topiramate  -certain medicines that treat or prevent blood clots like warfarin  -cholestyramine  -female hormones, like estrogens and birth control pills, patches, or  rings  -propofol  -rifampin  -ritonavir  -tolbutamide  -zidovudine  This list may not describe all possible interactions. Give your health care provider a list of all the medicines, herbs, non-prescription drugs, or dietary supplements you use. Also tell them if you smoke, drink alcohol, or use illegal drugs. Some items may interact with your medicine.  What should I watch for while using this medicine?  Tell your doctor or healthcare professional if your symptoms do not get better or they start to get worse.  Wear a medical ID bracelet or chain, and carry a card that describes your disease and details of your medicine and dosage times.  You may get drowsy, dizzy, or have blurred vision. Do not drive, use machinery, or do anything that needs mental alertness until you know how this medicine affects you. To reduce dizzy or fainting spells, do not sit or stand up quickly, especially if you are an older patient. Alcohol can increase drowsiness and dizziness. Avoid alcoholic drinks.  This medicine can make you more sensitive to the sun. Keep out of the sun. If you cannot avoid being in the sun, wear protective clothing and use sunscreen. Do not use sun lamps or tanning beds/booths.  Patients and their families should watch out for new or worsening depression or thoughts of suicide. Also watch out for sudden changes in feelings such as feeling anxious, agitated, panicky, irritable, hostile, aggressive, impulsive, severely restless, overly excited and hyperactive, or not being able to sleep. If this happens, especially at the beginning of treatment or after a change in dose, call your health care professional.  Women should inform their doctor if they wish to become pregnant or think they might be pregnant. There is a potential for serious side effects to an unborn child. Talk to your health care professional or pharmacist for more information. Women who become pregnant while using this medicine may enroll in the [place]  [place] Antiepileptic Drug Pregnancy Registry by calling 1-139.552.5664. This registry collects information about the safety of antiepileptic drug use during pregnancy.  What side effects may I notice from receiving this medicine?  Side effects that you should report to your doctor or health care professional as soon as possible:  -allergic reactions like skin rash, itching or hives, swelling of the face, lips, or tongue  -changes in vision  -redness, blistering, peeling or loosening of the skin, including inside the mouth  -signs and symptoms of liver injury like dark yellow or brown urine; general ill feeling or flu-like symptoms; light-colored stools; loss of appetite; nausea; right upper belly pain; unusually weak or tired; yellowing of the eyes or skin  -suicidal thoughts or other mood changes  -unusual bleeding or bruising  Side effects that usually do not require medical attention (report to your doctor or health care professional if they continue or are bothersome):  -constipation  -diarrhea  -dizziness  -hair loss  -headache  -loss of appetite  -weight gain  This list may not describe all possible side effects. Call your doctor for medical advice about side effects. You may report side effects to FDA at 4-611-FDA-1396.  Where should I keep my medicine?  Keep out of reach of children.  Store at room temperature between 15 and 30 degrees C (59 and 86 degrees F). Keep container tightly closed. Throw away any unused medicine after the expiration date.  NOTE: This sheet is a summary. It may not cover all possible information. If you have questions about this medicine, talk to your doctor, pharmacist, or health care provider.  © 2018 [employer] (2017-03-23 07:11:40)    Glimepiride tablets  What is this medicine?  GLIMEPIRIDE (GLYE me arya ride) helps to treat type 2 diabetes. Treatment is combined with diet and exercise. This medicine helps your body use insulin better.  This medicine may be used for  other purposes; ask your health care provider or pharmacist if you have questions.  COMMON BRAND NAME(S): Amaryl  What should I tell my health care provider before I take this medicine?  They need to know if you have any of these conditions:  -diabetic ketoacidosis  -glucose-6-phosphate dehydrogenase deficiency  -heart disease  -kidney disease  -liver disease  -severe infection or injury  -thyroid disease  -an unusual or allergic reaction to glimepiride, sulfa drugs, other medicines, foods, dyes, or preservatives  -pregnancy or recent attempts to get pregnant  -breast-feeding  How should I use this medicine?  Take this medicine by mouth. Swallow with a drink of water. Follow the directions on the prescription label. Take your dose at the same time each day, with breakfast or your first large meal. Do not take more often than directed.  Talk to your pediatrician regarding the use of this medicine in children. Special care may be needed.  Elderly patients over 65 years old can have a stronger reaction and need a smaller dose.  Overdosage: If you think you have taken too much of this medicine contact a poison control center or emergency room at once.  NOTE: This medicine is only for you. Do not share this medicine with others.  What if I miss a dose?  If you miss a dose, take it as soon as you can. If it is almost time for your next dose, take only that dose. Do not take double or extra doses.  What may interact with this medicine?  -bosentan  -chloramphenicol  -cisapride  -clarithromycin  -medicines for fungal or yeast infections  -metoclopramide  -probenecid  -warfarin  Many medications may cause an increase or decrease in blood sugar, these include:  -alcohol containing beverages  -aspirin and aspirin-like drugs  -chloramphenicol  -chromium  -female hormones, like estrogens or progestins and birth control pills  -fluoxetine  -heart medicines like disopyramide  -isoniazid  -male hormones or anabolic  steroids  -medicines called MAO Inhibitors like Nardil, Parnate, Marplan, Eldepryl  -medicines for allergies, asthma, cold, or cough  -medicines for mental problems  -medicines for weight loss  -niacin  -NSAIDs, medicines for pain and inflammation, like ibuprofen or naproxen  -pentamidine  -phenytoin  -probenecid  -quinolone antibiotics like ciprofloxacin, levofloxacin, ofloxacin  -some herbal dietary supplements  -steroid medicines like prednisone or cortisone  -thyroid medicine  -water pills or diuretics  This list may not describe all possible interactions. Give your health care provider a list of all the medicines, herbs, non-prescription drugs, or dietary supplements you use. Also tell them if you smoke, drink alcohol, or use illegal drugs. Some items may interact with your medicine.  What should I watch for while using this medicine?  Visit your doctor or health care professional for regular checks on your progress.  A test called the HbA1C (A1C) will be monitored. This is a simple blood test. It measures your blood sugar control over the last 2 to 3 months. You will receive this test every 3 to 6 months.  Learn how to check your blood sugar. Learn the symptoms of low and high blood sugar and how to manage them.  Always carry a quick-source of sugar with you in case you have symptoms of low blood sugar. Examples include hard sugar candy or glucose tablets. Make sure others know that you can choke if you eat or drink when you develop serious symptoms of low blood sugar, such as seizures or unconsciousness. They must get medical help at once.  Tell your doctor or health care professional if you have high blood sugar. You might need to change the dose of your medicine. If you are sick or exercising more than usual, you might need to change the dose of your medicine.  Do not skip meals. Ask your doctor or health care professional if you should avoid alcohol. Many nonprescription cough and cold products contain  sugar or alcohol. These can affect blood sugar.  This medicine can make you more sensitive to the sun. Keep out of the sun. If you cannot avoid being in the sun, wear protective clothing and use sunscreen. Do not use sun lamps or tanning beds/booths.  Wear a medical ID bracelet or chain, and carry a card that describes your disease and details of your medicine and dosage times.  What side effects may I notice from receiving this medicine?  Side effects that you should report to your doctor or health care professional as soon as possible:  -allergic reactions like skin rash, itching or hives, swelling of the face, lips, or tongue  -breathing problems  -dark urine  -fever, chills, sore throat  -signs and symptoms of low blood sugar such as feeling anxious, confusion, dizziness, increased hunger, unusually weak or tired, sweating, shakiness, cold, irritable, headache, blurred vision, fast heartbeat, loss of consciousness  -unusual bleeding or bruising  -yellowing of the eyes or skin  Side effects that usually do not require medical attention (report to your doctor or health care professional if they continue or are bothersome):  -diarrhea  -dizziness  -headache  -heartburn  -nausea  -stomach gas  This list may not describe all possible side effects. Call your doctor for medical advice about side effects. You may report side effects to FDA at 2-862-FDA-0661.  Where should I keep my medicine?  Keep out of the reach of children.  Store at room temperature below 30 degrees C (86 degrees F). Throw away any unused medicine after the expiration date.  NOTE: This sheet is a summary. It may not cover all possible information. If you have questions about this medicine, talk to your doctor, pharmacist, or health care provider.  © 2018 [employer] Standard (2014-04-02 14:29:47)      Levothyroxine tablets  What is this medicine?  LEVOTHYROXINE (gus voe thye ALYCE een) is a thyroid hormone. This medicine can improve symptoms of  thyroid deficiency such as slow speech, lack of energy, weight gain, hair loss, dry skin, and feeling cold. It also helps to treat goiter (an enlarged thyroid gland). It is also used to treat some kinds of thyroid cancer along with surgery and other medicines.  This medicine may be used for other purposes; ask your health care provider or pharmacist if you have questions.  COMMON BRAND NAME(S): Estre, Levo-T, Levothroid, Levoxyl, Synthroid, Thyro-Tabs, Unithroid  What should I tell my health care provider before I take this medicine?  They need to know if you have any of these conditions:  -angina  -blood clotting problems  -diabetes  -dieting or on a weight loss program  -fertility problems  -heart disease  -high levels of thyroid hormone  -pituitary gland problem  -previous heart attack  -an unusual or allergic reaction to levothyroxine, thyroid hormones, other medicines, foods, dyes, or preservatives  -pregnant or trying to get pregnant  -breast-feeding  How should I use this medicine?  Take this medicine by mouth with plenty of water. It is best to take on an empty stomach, at least 30 minutes before or 2 hours after food. Follow the directions on the prescription label. Take at the same time each day. Do not take your medicine more often than directed.  Contact your pediatrician regarding the use of this medicine in children. While this drug may be prescribed for children and infants as young as a few days of age for selected conditions, precautions do apply. For infants, you may crush the tablet and place in a small amount of (5-10 ml or 1 to 2 teaspoonfuls) of water, breast milk, or non-soy based infant formula. Do not mix with soy-based infant formula. Give as directed.  Overdosage: If you think you have taken too much of this medicine contact a poison control center or emergency room at once.  NOTE: This medicine is only for you. Do not share this medicine with others.  What if I miss a dose?  If you miss a  dose, take it as soon as you can. If it is almost time for your next dose, take only that dose. Do not take double or extra doses.  What may interact with this medicine?  -amiodarone  -antacids  -anti-thyroid medicines  -calcium supplements  -carbamazepine  -cholestyramine  -colestipol  -digoxin  -female hormones, including contraceptive or birth control pills  -iron supplements  -ketamine  -liquid nutrition products like Ensure  -medicines for colds and breathing difficulties  -medicines for diabetes  -medicines for mental depression  -medicines or herbals used to decrease weight or appetite  -phenobarbital or other barbiturate medications  -phenytoin  -prednisone or other corticosteroids  -rifabutin  -rifampin  -soy isoflavones  -sucralfate  -theophylline  -warfarin  This list may not describe all possible interactions. Give your health care provider a list of all the medicines, herbs, non-prescription drugs, or dietary supplements you use. Also tell them if you smoke, drink alcohol, or use illegal drugs. Some items may interact with your medicine.  What should I watch for while using this medicine?  Be sure to take this medicine with plenty of fluids. Some tablets may cause choking, gagging, or difficulty swallowing from the tablet getting stuck in your throat. Most of these problems disappear if the medicine is taken with the right amount of water or other fluids.  Do not switch brands of this medicine unless your health care professional agrees with the change. Ask questions if you are uncertain.  You will need regular exams and occasional blood tests to check the response to treatment. If you are receiving this medicine for an underactive thyroid, it may be several weeks before you notice an improvement. Check with your doctor or health care professional if your symptoms do not improve.  It may be necessary for you to take this medicine for the rest of your life. Do not stop using this medicine unless your  doctor or health care professional advises you to.  This medicine can affect blood sugar levels. If you have diabetes, check your blood sugar as directed.  You may lose some of your hair when you first start treatment. With time, this usually corrects itself.  If you are going to have surgery, tell your doctor or health care professional that you are taking this medicine.  What side effects may I notice from receiving this medicine?  Side effects that you should report to your doctor or health care professional as soon as possible:  -allergic reactions like skin rash, itching or hives, swelling of the face, lips, or tongue  -chest pain  -excessive sweating or intolerance to heat  -fast or irregular heartbeat  -nervousness  -skin rash or hives  -swelling of ankles, feet, or legs  -tremors  Side effects that usually do not require medical attention (report to your doctor or health care professional if they continue or are bothersome):  -changes in appetite  -changes in menstrual periods  -diarrhea  -hair loss  -headache  -trouble sleeping  -weight loss  This list may not describe all possible side effects. Call your doctor for medical advice about side effects. You may report side effects to FDA at 2-457-FDA-1869.  Where should I keep my medicine?  Keep out of the reach of children.  Store at room temperature between 15 and 30 degrees C (59 and 86 degrees F). Protect from light and moisture. Keep container tightly closed. Throw away any unused medicine after the expiration date.  NOTE: This sheet is a summary. It may not cover all possible information. If you have questions about this medicine, talk to your doctor, pharmacist, or health care provider.  © 2018 [employer] Standard (2010-03-26 14:28:07)      Metformin tablets  What is this medicine?  METFORMIN (met FOR min) is used to treat type 2 diabetes. It helps to control blood sugar. Treatment is combined with diet and exercise. This medicine can be used alone  or with other medicines for diabetes.  This medicine may be used for other purposes; ask your health care provider or pharmacist if you have questions.  COMMON BRAND NAME(S): Glucophage  What should I tell my health care provider before I take this medicine?  They need to know if you have any of these conditions:  -anemia  -frequently drink alcohol-containing beverages  -become easily dehydrated  -heart attack  -heart failure that is treated with medications  -kidney disease  -liver disease  -polycystic ovary syndrome  -serious infection or injury  -vomiting  -an unusual or allergic reaction to metformin, other medicines, foods, dyes, or preservatives  -pregnant or trying to get pregnant  -breast-feeding  How should I use this medicine?  Take this medicine by mouth. Take it with meals. Swallow the tablets with a drink of water. Follow the directions on the prescription label. Take your medicine at regular intervals. Do not take your medicine more often than directed.  Talk to your pediatrician regarding the use of this medicine in children. While this drug may be prescribed for children as young as 10 years of age for selected conditions, precautions do apply.  Overdosage: If you think you have taken too much of this medicine contact a poison control center or emergency room at once.  NOTE: This medicine is only for you. Do not share this medicine with others.  What if I miss a dose?  If you miss a dose, take it as soon as you can. If it is almost time for your next dose, take only that dose. Do not take double or extra doses.  What may interact with this medicine?  Do not take this medicine with any of the following medications:  -dofetilide  -gatifloxacin  -certain contrast medicines given before X-rays, CT scans, MRI, or other procedures  This medicine may also interact with the following medications:  -acetazolamide  -certain medicines for HIV infection or hepatitis, like adefovir, emtricitabine, entecavir,  lamivudine, or tenofovir  -cimetidine  -crizotinib  -digoxin  -diuretics  -female hormones, like estrogens or progestins and birth control pills  -glycopyrrolate  -isoniazid  -lamotrigine  -medicines for blood pressure, heart disease, irregular heart beat  -memantine  -midodrine  -methazolamide  -morphine  -nicotinic acid  -phenothiazines like chlorpromazine, mesoridazine, prochlorperazine, thioridazine  -phenytoin  -procainamide  -propantheline  -quinidine  -quinine  -ranitidine  -ranolazine  -steroid medicines like prednisone or cortisone  -stimulant medicines for attention disorders, weight loss, or to stay awake  -thyroid medicines  -topiramate  -trimethoprim  -trospium  -vancomycin  -vandetanib  -zonisamide  This list may not describe all possible interactions. Give your health care provider a list of all the medicines, herbs, non-prescription drugs, or dietary supplements you use. Also tell them if you smoke, drink alcohol, or use illegal drugs. Some items may interact with your medicine.  What should I watch for while using this medicine?  Visit your doctor or health care professional for regular checks on your progress.  A test called the HbA1C (A1C) will be monitored. This is a simple blood test. It measures your blood sugar control over the last 2 to 3 months. You will receive this test every 3 to 6 months.  Learn how to check your blood sugar. Learn the symptoms of low and high blood sugar and how to manage them.  Always carry a quick-source of sugar with you in case you have symptoms of low blood sugar. Examples include hard sugar candy or glucose tablets. Make sure others know that you can choke if you eat or drink when you develop serious symptoms of low blood sugar, such as seizures or unconsciousness. They must get medical help at once.  Tell your doctor or health care professional if you have high blood sugar. You might need to change the dose of your medicine. If you are sick or exercising more  than usual, you might need to change the dose of your medicine.  Do not skip meals. Ask your doctor or health care professional if you should avoid alcohol. Many nonprescription cough and cold products contain sugar or alcohol. These can affect blood sugar.  This medicine may cause ovulation in premenopausal women who do not have regular monthly periods. This may increase your chances of becoming pregnant. You should not take this medicine if you become pregnant or think you may be pregnant. Talk with your doctor or health care professional about your birth control options while taking this medicine. Contact your doctor or health care professional right away if think you are pregnant.  If you are going to need surgery, a MRI, CT scan, or other procedure, tell your doctor that you are taking this medicine. You may need to stop taking this medicine before the procedure.  Wear a medical ID bracelet or chain, and carry a card that describes your disease and details of your medicine and dosage times.  What side effects may I notice from receiving this medicine?  Side effects that you should report to your doctor or health care professional as soon as possible:  -allergic reactions like skin rash, itching or hives, swelling of the face, lips, or tongue  -breathing problems  -feeling faint or lightheaded, falls  -muscle aches or pains  -signs and symptoms of low blood sugar such as feeling anxious, confusion, dizziness, increased hunger, unusually weak or tired, sweating, shakiness, cold, irritable, headache, blurred vision, fast heartbeat, loss of consciousness  -slow or irregular heartbeat  -unusual stomach pain or discomfort  -unusually tired or weak  Side effects that usually do not require medical attention (report to your doctor or health care professional if they continue or are bothersome):  -diarrhea  -headache  -heartburn  -metallic taste in mouth  -nausea  -stomach gas, upset  This list may not describe all  possible side effects. Call your doctor for medical advice about side effects. You may report side effects to FDA at 2-196-FDA-4439.  Where should I keep my medicine?  Keep out of the reach of children.  Store at room temperature between 15 and 30 degrees C (59 and 86 degrees F). Protect from moisture and light. Throw away any unused medicine after the expiration date.  NOTE: This sheet is a summary. It may not cover all possible information. If you have questions about this medicine, talk to your doctor, pharmacist, or health care provider.  © 2018 [employer] (2015-06-02 22:14:40)    Montelukast oral tablets  What is this medicine?  MONTELUKAST (mon te LOO kast) is used to prevent and treat the symptoms of asthma. It is also used to treat allergies. Do not use for an acute asthma attack.  This medicine may be used for other purposes; ask your health care provider or pharmacist if you have questions.  COMMON BRAND NAME(S): Singulair  What should I tell my health care provider before I take this medicine?  They need to know if you have any of these conditions:  -liver disease  -an unusual or allergic reaction to montelukast, other medicines, foods, dyes, or preservatives  -pregnant or trying to get pregnant  -breast-feeding  How should I use this medicine?  This medicine should be given by mouth. Follow the directions on the prescription label. Take this medicine at the same time every day. You may take this medicine with or without meals. Do not chew the tablets. Do not stop taking your medicine unless your doctor tells you to.  Talk to your pediatrician regarding the use of this medicine in children. Special care may be needed. While this drug may be prescribed for children as young as 15 years of age for selected conditions, precautions do apply.  Overdosage: If you think you have taken too much of this medicine contact a poison control center or emergency room at once.  NOTE: This medicine is only for  you. Do not share this medicine with others.  What if I miss a dose?  If you miss a dose, take it as soon as you can. If it is almost time for your next dose, take only that dose. Do not take double or extra doses.  What may interact with this medicine?  -anti-infectives like rifampin and rifabutin  -medicines for diabetes like rosiglitazone and repaglinide  -medicines for seizures like phenytoin, phenobarbital, and carbamazepine  -paclitaxel  This list may not describe all possible interactions. Give your health care provider a list of all the medicines, herbs, non-prescription drugs, or dietary supplements you use. Also tell them if you smoke, drink alcohol, or use illegal drugs. Some items may interact with your medicine.  What should I watch for while using this medicine?  Visit your doctor or health care professional for regular checks on your progress. Tell your doctor or health care professional if your allergy or asthma symptoms do not improve. Take your medicine even when you do not have symptoms. Do not stop taking any of your medicine(s) unless your doctor tells you to.  If you have asthma, talk to your doctor about what to do in an acute asthma attack. Always have your inhaled rescue medicine for asthma attacks with you.  Patients and their families should watch for new or worsening thoughts of suicide or depression. Also watch for sudden changes in feelings such as feeling anxious, agitated, panicky, irritable, hostile, aggressive, impulsive, severely restless, overly excited and hyperactive, or not being able to sleep. Any worsening of mood or thoughts of suicide or dying should be reported to your health care professional right away.  What side effects may I notice from receiving this medicine?  Side effects that you should report to your doctor or health care professional as soon as possible:  -allergic reactions like skin rash or hives, or swelling of the face, lips, or tongue  -breathing  problems  -confusion  -dark urine  -fever or infection  -flu-like symptoms  -hallucinations  -painful lumps under the skin  -pain, tingling, numbness in the hands or feet  -sinus pain or swelling  -suicidal thoughts or other mood changes  -tremors  -trouble sleeping  -uncontrolled muscle movements  -unusual bleeding or bruising  -yellowing of the eyes or skin  Side effects that usually do not require medical attention (report to your doctor or health care professional if they continue or are bothersome):  -cough  -dizziness  -drowsiness  -headache  -nightmares  -stomach upset  -stuffy nose  This list may not describe all possible side effects. Call your doctor for medical advice about side effects. You may report side effects to FDA at 2-807-FDA-2795.  Where should I keep my medicine?  Keep out of the reach of children.  Store at room temperature between 15 and 30 degrees C (59 and 86 degrees F). Protect from light and moisture. Keep this medicine in the original bottle. Throw away any unused medicine after the expiration date.  NOTE: This sheet is a summary. It may not cover all possible information. If you have questions about this medicine, talk to your doctor, pharmacist, or health care provider.  © 2018 [employer] (2016-12-19 09:40:44)    Sertraline tablets  What is this medicine?  SERTRALINE (SER tra florin) is used to treat depression. It may also be used to treat obsessive compulsive disorder, panic disorder, post-trauma stress, premenstrual dysphoric disorder (PMDD) or social anxiety.  This medicine may be used for other purposes; ask your health care provider or pharmacist if you have questions.  COMMON BRAND NAME(S): Zoloft  What should I tell my health care provider before I take this medicine?  They need to know if you have any of these conditions:  -bleeding disorders  -bipolar disorder or a family history of bipolar disorder  -glaucoma  -heart disease  -high blood pressure  -history of  irregular heartbeat  -history of low levels of calcium, magnesium, or potassium in the blood  -if you often drink alcohol  -liver disease  -receiving electroconvulsive therapy  -seizures  -suicidal thoughts, plans, or attempt; a previous suicide attempt by you or a family member  -take medicines that treat or prevent blood clots  -thyroid disease  -an unusual or allergic reaction to sertraline, other medicines, foods, dyes, or preservatives  -pregnant or trying to get pregnant  -breast-feeding  How should I use this medicine?  Take this medicine by mouth with a glass of water. Follow the directions on the prescription label. You can take it with or without food. Take your medicine at regular intervals. Do not take your medicine more often than directed. Do not stop taking this medicine suddenly except upon the advice of your doctor. Stopping this medicine too quickly may cause serious side effects or your condition may worsen.  A special MedGuide will be given to you by the pharmacist with each prescription and refill. Be sure to read this information carefully each time.  Talk to your pediatrician regarding the use of this medicine in children. While this drug may be prescribed for children as young as 7 years for selected conditions, precautions do apply.  Overdosage: If you think you have taken too much of this medicine contact a poison control center or emergency room at once.  NOTE: This medicine is only for you. Do not share this medicine with others.  What if I miss a dose?  If you miss a dose, take it as soon as you can. If it is almost time for your next dose, take only that dose. Do not take double or extra doses.  What may interact with this medicine?  Do not take this medicine with any of the following medications:  -certain medicines for fungal infections like fluconazole, itraconazole, ketoconazole, posaconazole, voriconazole  -cisapride  -disulfiram  -dofetilide  -linezolid  -MAOIs like Carbex,  Eldepryl, Marplan, Nardil, and Parnate  -metronidazole  -methylene blue (injected into a vein)  -pimozide  -thioridazine  -ziprasidone  This medicine may also interact with the following medications:  -alcohol  -amphetamines  -aspirin and aspirin-like medicines  -certain medicines for depression, anxiety, or psychotic disturbances  -certain medicines for irregular heart beat like flecainide, propafenone  -certain medicines for migraine headaches like almotriptan, eletriptan, frovatriptan, naratriptan, rizatriptan, sumatriptan, zolmitriptan  -certain medicines for sleep  -certain medicines for seizures like carbamazepine, valproic acid, phenytoin  -certain medicines that treat or prevent blood clots like warfarin, enoxaparin, dalteparin  -cimetidine  -digoxin  -diuretics  -fentanyl  -furazolidone  -isoniazid  -lithium  -NSAIDs, medicines for pain and inflammation, like ibuprofen or naproxen  -other medicines that prolong the QT interval (cause an abnormal heart rhythm)  -procarbazine  -rasagiline  -supplements like [place]'s wort, kava kava, valerian  -tolbutamide  -tramadol  -tryptophan  This list may not describe all possible interactions. Give your health care provider a list of all the medicines, herbs, non-prescription drugs, or dietary supplements you use. Also tell them if you smoke, drink alcohol, or use illegal drugs. Some items may interact with your medicine.  What should I watch for while using this medicine?  Tell your doctor if your symptoms do not get better or if they get worse. Visit your doctor or health care professional for regular checks on your progress. Because it may take several weeks to see the full effects of this medicine, it is important to continue your treatment as prescribed by your doctor.  Patients and their families should watch out for new or worsening thoughts of suicide or depression. Also watch out for sudden changes in feelings such as feeling anxious, agitated, panicky,  irritable, hostile, aggressive, impulsive, severely restless, overly excited and hyperactive, or not being able to sleep. If this happens, especially at the beginning of treatment or after a change in dose, call your health care professional.  You may get drowsy or dizzy. Do not drive, use machinery, or do anything that needs mental alertness until you know how this medicine affects you. Do not stand or sit up quickly, especially if you are an older patient. This reduces the risk of dizzy or fainting spells. Alcohol may interfere with the effect of this medicine. Avoid alcoholic drinks.  Your mouth may get dry. Chewing sugarless gum or sucking hard candy, and drinking plenty of water may help. Contact your doctor if the problem does not go away or is severe.  What side effects may I notice from receiving this medicine?  Side effects that you should report to your doctor or health care professional as soon as possible:  -allergic reactions like skin rash, itching or hives, swelling of the face, lips, or tongue  -anxious  -black, tarry stools  -changes in vision  -confusion  -elevated mood, decreased need for sleep, racing thoughts, impulsive behavior  -eye pain  -fast, irregular heartbeat  -feeling faint or lightheaded, falls  -feeling agitated, angry, or irritable  -hallucination, loss of contact with reality  -loss of balance or coordination  -loss of memory  -painful or prolonged erections  -restlessness, pacing, inability to keep still  -seizures  -stiff muscles  -suicidal thoughts or other mood changes  -trouble sleeping  -unusual bleeding or bruising  -unusually weak or tired  -vomiting  Side effects that usually do not require medical attention (report to your doctor or health care professional if they continue or are bothersome):  -change in appetite or weight  -change in sex drive or performance  -diarrhea  -increased sweating  -indigestion, nausea  -tremors  This list may not describe all possible side  effects. Call your doctor for medical advice about side effects. You may report side effects to FDA at 3-336-GMC-2069.  Where should I keep my medicine?  Keep out of the reach of children.  Store at room temperature between 15 and 30 degrees C (59 and 86 degrees F). Throw away any unused medicine after the expiration date.  NOTE: This sheet is a summary. It may not cover all possible information. If you have questions about this medicine, talk to your doctor, pharmacist, or health care provider.  © 2018 Elsevier/Gold Standard (2017-05-20 15:54:02)

## 2019-01-11 NOTE — DISCHARGE SUMMARY
Rehab Discharge Summary    Admission Date: 1/3/2019    Discharge Date: 1/11/2019    Attending Provider: Karan Smith MD/PhD    Admission Diagnosis:   Active Hospital Problems    Diagnosis   • *Conversion disorder   • Right sided weakness   • Bipolar disorder (HCC)   • Acquired hypothyroidism   • Depression   • HLD (hyperlipidemia)   • Seizure (HCC)   • Asthma       Discharge Diagnosis:  Active Hospital Problems    Diagnosis   • *Conversion disorder   • Right sided weakness   • Bipolar disorder (HCC)   • Acquired hypothyroidism   • Depression   • HLD (hyperlipidemia)   • Seizure (HCC)   • Asthma       HPI per H&P:  Patient is a 38 y.o. male with a PMH of obesity, BPD1, hypothyroidism, HLD, depression, epilepsy and previous TBI vs encephalitis who presented on 12/30/18 with right sided weakness after waking from sleep. Per report at baseline patient lives in group home and has had multiple admissions for weakness with no known etiology in the past.  He was not a candidate for tPA as unclear when symptoms started. Patient’s work-up was negative except that his Depakote level was low.  Patient had CT head and MRI which were negative. EEG was positive for cortical dysfunction over bi-temporal lobes suggested of increased risk of seizures.  Neurology was consulted and found no objective symptoms on examination besides weakness so concern for conversion disorder.   Physiatry was consulted as concern for functional loss and in agreement of conversion disorder vs Santosh’s paralysis.  Patient’s symptoms have been ongoing for over 4 days.      Patient was previously living in a group home with intermittent help.  He was last evaluated by SLP on 12/31/18 and was recommended to have dysphagia 3 with thin liquids.  He was last evaluated by PT on 1/2/18 and was modA for transfers and unable to stand for gait. Per PT note patient was activating right side during transfer including hold arms. Patient was last evaluated by  OT On 1/1/18 and was modA for ADLs.      Patient reports he had an infection in his brain in 2010 which left him disabled.  He reports he was scared he had another infection this time. He reports he got up to take his nightly medications when he first noticed the weakness.     Patient was admitted to [facility] on 1/3/2019.     Hospital Course by Problem List:  Conversion Disorder vs Santosh’s Paralysis - Patient with weakness on right side after waking with history of multiple seizures. Depakote level was low on admission and EEG showed bilateral temporal cortical dysfunction.  Patient has had multiple admission for similar complaint and no neurologic symptoms other than weakness.  Neurology and psychiatry have followed him in the past. May be a component of malingering as multiple admissions for similar symptoms at relatively regular occurrences. Less likely stroke, will discontinue ASA.  Patient observed by multiple therapists to use RUE at level which was not consistent with his MMT.  Patient progressed rapidly with good positive reinforcement about prognosis. Patient underwent acute inpatient rehabilitation from 1/3/19 to 1/11/19 with good improvement in mobility and ADLs. Patient to discharge with FWW.      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  Patient on Depakote 500/1500 mg on transfer.  EEG with bilateral temporal cortical dysfunction.  Depakote level 86.3 on 1/8  -Continue Depakote 500 / 1500.   -Patient would like new neurologist who specializes in seizures. Discussed referral to [facility]     BPD1/Depression - Patient on Buspar 15 mg TID and Sertaline on transfer in addition to Depakote.  Unclear if Prazosin is for PTSD as listed as current diagnosis.   -Continue Buspar 15 mg TID, Sertaline 100 mg and Prazosin 1 mg QHS     Asthma - On Singulair nightly at home.     HLD - Cholesterol 209, Triglyceride 416 and HDL 32 on 1/1/19.  Started on Atorvastatin 80 mg QHS  -Follow-up with PCP     Obesity - Patient with BMI of 33 on admission. Will consult dietitian      Anemia - 13.6 on admission up from 13.2 on 19     DM - History of DM, A1c 6.6 on admission. He reports being diet controlled with PO agents  -Continue PO agents at discharge, does not know name but reports mother has medication. Metformin and Glimepiride in med history     Hypothyroidism - Patient on 137 mcg daily     Tobacco use - Patient will need counseling prior to discharge.  Counseling performed on 18, he reports he has recently quit but will continue to try to not smoke.      Vitamin D - 23 on admission, started on 1000 U supplement     DVT Ppx - Patient on Lovenox on transfer.  Ambulated >125 feet x2. Will discontinue Lovenox     Functional Status at Discharge  Eatin - Independent  Eating Description:  Modified diet, Increased time, Adaptive equipment, Supervision for safety  Groomin - Independent  Grooming Description:   (setup for oral care  wash / dry face and brush hair.   patient used left hand to complete tasks )  Bathin - Independent  Bathing Description:     Upper Body Dressin - Modified Independent  Upper Body Dressing Description:     Lower Body Dressin - Modified Independent  Lower Body Dressing Description:  6 - Modified Independent  Discharge Location : Group Home  Patient Discharging with Assist of: Other (See Comments) (group home staff )  Recommended Equipment for Discharge: Front-Wheeled Walker  Recommended Services Upon Discharge: No Follow-Up Occupational Therapy Recommended  Long Term Goals Met: supervision  ADL and related mobility   Criteria for Termination of Services: Maximum Function Achieved for Inpatient Rehabilitation  Walk:  5 - Standby Prompting/Supervision or Set-up  Distance Walked:  Walks a minimum of 150 feet  Walk Description:  Extra time, Safety concerns, Supervision for safety, Verbal cueing, Walker (AMB x  "220 feet with FWW and SBA/SPV)  Wheelchair:  6 - Modified Independent  Distance Propelled:  Propels a minimum of 150 feet   Wheelchair Description:  Adaptive equipment, Extra time (x250 feet with BUE, mod I)  Stairs 2 - Max Assistance  Stairs DescriptionAscends/descends 4 to 11 steps, Walker (CGA - SBA with FWW up/over 4 2-6\" steps )  Discharge Location: Group Home  Patient Discharging with Assist of: Other (See Comments) (Pt lives in group home)  Level of Supervision Required Upon Discharge: Intermittent Supervision  Recommended Equipment for Discharge: Front-Wheeled Walker  Recommeded Services Upon Discharge: No Follow Up Services Recommended  Long Term Goals Met: 3  Long Term Goals Not Met: 0  Reason(s) for Goals Not Met: N/A  Criteria for Termination of Services: Maximum Function Achieved for Inpatient Rehabilitation  Comprehension Mode:  Auditory  Comprehension:  6 - Modified Independent  Comprehension Description:  Glasses, Verbal cues  Expression Mode:  Vocal  Expression:  7 - Independent  Expression Description:     Social Interaction:  6 - Modified Independent  Social Interaction Description:  Increased time, Medication  Problem Solvin - Standby Prompting/Supervision or Set-up  Problem Solving Description:  Verbal cueing, Therapy schedule, Increased time  Memory:  5 - Standby Prompting/Supervision or Set-up  Memory Description:  Verbal cueing, Therapy schedule  Progress since Admit: Patient has made progress toward ST goals.   Patient completed readministration of Arthur achieving a score of 24, showing an improvment from 2230 on initial evaluation.  However, patient remains within the mild impairment range.  Patient is assisted with medications at his group home, however, educated patient on his current medication regime.  He is able to effectively use a written reference to support recall of medications names and times to take,  He is familiar with most of his medications and is able to recall " purposes and times taken with 100% acc with min A.  Patient worked on executive function (five activities task)   Mod cues needed to anticipate and plan for task challenges.   Impaired initiation of planning and problem solving. Patient is able to perform alternating simple attention tasks with 90% acc  with task set up.  patient was able to demonstrate recall of safe transfer sequencing with 100% acc with set up.  Patient has a history of prior cognitive impairments and is likely at [facility].  Discharge Location : Group Home  Patient Discharging with Assist of: Paid Caregiver  Level of Supervision Required: Intermittent Supervision  Recommended Services Upon Discharge: No Follow-Up Speech Therapy Recommended  Long Term Goals Met: Patient will solve basic problems Chintan for safe discharge to assisted living facility  Criteria for Termination of Services: Maximum Function Achieved for Inpatient Rehabilitation    I, Karan Smith M.D., personally performed a complete drug regimen review and no potential clinically significant medication issues were identified.   Discharge Medication:     Medication List      CHANGE how you take these medications      Instructions   atorvastatin 80 MG tablet  What changed:  when to take this  Commonly known as:  LIPITOR  Notes to patient:  Lowers cholesterol   Take 1 Tab by mouth every day.  Dose:  80 mg     prazosin 1 MG Caps  What changed:  when to take this  Commonly known as:  MINIPRESS  Notes to patient:  Controls blood pressure   Take 1 Cap by mouth every evening.  Dose:  1 mg        CONTINUE taking these medications      Instructions   busPIRone 15 MG tablet  Commonly known as:  BUSPAR  Notes to patient:  antidepressant   Take 1 Tab by mouth 3 times a day.  Dose:  15 mg     [employer] D3 2000 UNIT Caps  Generic drug:  Cholecalciferol   Take 4,000 Units by mouth every evening.  Dose:  4000 Units     divalproex 500 MG Tbec  Commonly known as:  DEPAKOTE   Take 1-3 Tabs by mouth 2  Times a Day. 500 mg AM 1500 mg PM  Dose:  500-1500 mg     glimepiride 4 MG Tabs  Commonly known as:  AMARYL   Take 4 mg by mouth every morning.  Dose:  4 mg     levothyroxine 137 MCG Tabs  Commonly known as:  SYNTHROID   Take 1 Tab by mouth Every morning on an empty stomach.  Dose:  137 mcg     metformin 1000 MG tablet  Commonly known as:  GLUCOPHAGE   Take 1 Tab by mouth 2 times a day, with meals.  Dose:  1000 mg     montelukast 10 MG Tabs  Commonly known as:  SINGULAIR   Take 1 Tab by mouth every day.  Dose:  10 mg     sertraline 100 MG Tabs  Commonly known as:  ZOLOFT   Take 1 Tab by mouth every morning.  Dose:  100 mg        STOP taking these medications    aspirin EC 81 MG Tbec  Commonly known as:  ECOTRIN     enoxaparin 40 MG/0.4ML Soln inj  Commonly known as:  LOVENOX     raNITidine 150 MG Tabs  Commonly known as:  ZANTAC            Discharge Diet:  Diabetic    Discharge Activity:  As tolerated     Disposition:  Patient to discharge to group home    Equipment:  FWW    Follow-up & Discharge Instructions:  Follow up with your primary care provider (PCP) within 7-10 days of discharge to review your medications and take over your care.     If you develop chest pain, fever, chills, change in neurologic function (weakness, sensation changes, vision changes), or other concerning sxs, seek immediate medical attention or call 911.      Condition on Discharge:  Good    More than 40 minutes was spent on discharging this patient, including face-to-face time, prescription management, and the dictation of this note.    Karan Smith M.D.    Date of Service: 1/11/2019

## 2019-01-11 NOTE — CARE PLAN
Problem: Recreation Therapy  Goal: LTG-By discharge, patient will  Identify at least two new leisure activities he would engage in and barriers to engaging in them directly with recreation therapist.    Outcome: MET Date Met: 01/11/19  Identified many activities he could engage in following discharge and shared no barriers for participation.    Goal: STG-Within one week, patient will  Use his right hand  strength in a fine motor activity at least 50% of the time in each session seen.    Outcome: MET Date Met: 01/11/19  Was able to use his right hand 100% of the time while playing the [employer] games.

## 2019-01-11 NOTE — DISCHARGE PLANNING
Case Management Discharge Instructions        Discharge Location: Assisted Living     Agency Name / Address / Phone: [facility]         [address]        [place] [address]          [phone]     Medical Equipment Provider / Phone: [facility]        [facility]        [place] [address]        [phone]     Medical Equipment Ordered: Front Wheel Walker      Follow-up Information:     ANIKET Chung Primary Care Provider  [address] [facility]  [place] [id number] [phone]     On 1/16/2019 Wednesday, check in at 3:45pm for 4:00pm appointment    Kyle Osuna M.D.  Neurology  [address] [place] [address]  [place] [id number] [phone]     On 1/15/2019 Tuesday, check in at 8:45am for appointment 9:00am.

## 2019-01-11 NOTE — DISCHARGE PLANNING
Met w/ patient yesterday to review IDT team recommendations & on track for DC date 1.11.19. Informed I had contacted [facility] to assess for return to [facility]. Therapy team recommends FWW. Reviewed choice, patient states no preference. Questions answered. Updated regarding PCP appt. Informed I will provide DC instruction sheet w/ new PCP appt date.

## 2019-01-11 NOTE — DISCHARGE PLANNING
"Case management Summary:   Met with patient prior to discharge.   Reviewed all follow up appointments: PCP & new patient appointment w/ neuro.      [facility] has delivered FWW to patient.    During hospitalization, I have provided support and education and have been available for questions and information during hours of operation, communicated with therapy team and MD along with providing links/resources  to outside services.    Patient verbalizes agreement with all plans and has an understanding of the next steps within the post acute services.     Individualized Goals:   1. To return to [facility] & skilled nursing.  2. To increase functional mobility, \"for right side to move again.\"  3. Assess for DME needs & f/u MD appts.       Outcome:   1. Goal met & completed: discharging w/ mother transporting to [facility]. FWW obtained.  2. Goal met & completed.  3. Goal met & completed: FWW obtained, new patient appointment w/ neuro set up, PCP in place.    "

## 2019-01-11 NOTE — CARE PLAN
Problem: Speech/Swallowing LTGs  Goal: LTG-By discharge, patient will solve basic problems  1) Individualized goal:  Chintan for safe discharge to assisted living facility  2) Interventions:  SLP Speech Language Treatment, SLP Cognitive Skill Development and SLP Group Treatment     Outcome: MET Date Met: 01/10/19      Problem: Problem Solving STGs  Goal: STG-Within one week, patient will  1) Individualized goal:  Perform alternating attention and functional organization tasks with 90% acc.  2) Interventions:  SLP Speech Language Treatment, SLP Self Care / ADL Training , SLP Cognitive Skill Development and SLP Group Treatment     Outcome: MET Date Met: 01/10/19  In basic tasks patient is 90%.  In complex executive function task patient needs mod A.    Problem: Memory STGs  Goal: STG-Within one week, patient will  1) Individualized goal:  New training related to safety and stroke education with 90% acc with mod I.  2) Interventions:  SLP Speech Language Treatment, SLP Self Care / ADL Training , SLP Cognitive Skill Development and SLP Group Treatment     Outcome: MET Date Met: 01/10/19

## 2019-01-12 NOTE — PROGRESS NOTES
Patient discharged to group home per order.  Reviewed all discharge instructions, appointments, discharge medications,  Discharge paperwork completed; signed copies in chart.  Patient has education binder and all belongings; signed copy in chart.  Pt alert, calm, stable; no change in status from morning assessment.  Patient left facility at 1429 with mom accompanied by staff; escorted to car by staff.

## 2019-01-16 NOTE — ADDENDUM NOTE
Encounter addended by: Kyle Osuna M.D. on: 1/16/2019  9:07 AM<BR>    Actions taken: Sign clinical note

## 2019-02-22 ENCOUNTER — APPOINTMENT (OUTPATIENT)
Dept: RADIOLOGY | Facility: MEDICAL CENTER | Age: 39
End: 2019-02-22
Attending: EMERGENCY MEDICINE
Payer: MEDICAID

## 2019-02-22 ENCOUNTER — HOSPITAL ENCOUNTER (EMERGENCY)
Facility: MEDICAL CENTER | Age: 39
End: 2019-02-22
Attending: EMERGENCY MEDICINE
Payer: MEDICAID

## 2019-02-22 VITALS
HEIGHT: 78 IN | RESPIRATION RATE: 16 BRPM | WEIGHT: 310.85 LBS | HEART RATE: 71 BPM | DIASTOLIC BLOOD PRESSURE: 79 MMHG | BODY MASS INDEX: 35.97 KG/M2 | SYSTOLIC BLOOD PRESSURE: 107 MMHG | OXYGEN SATURATION: 96 % | TEMPERATURE: 97.2 F

## 2019-02-22 DIAGNOSIS — S30.0XXA LUMBAR CONTUSION, INITIAL ENCOUNTER: ICD-10-CM

## 2019-02-22 PROCEDURE — 700102 HCHG RX REV CODE 250 W/ 637 OVERRIDE(OP): Performed by: EMERGENCY MEDICINE

## 2019-02-22 PROCEDURE — A9270 NON-COVERED ITEM OR SERVICE: HCPCS | Performed by: EMERGENCY MEDICINE

## 2019-02-22 PROCEDURE — 72100 X-RAY EXAM L-S SPINE 2/3 VWS: CPT

## 2019-02-22 PROCEDURE — 99284 EMERGENCY DEPT VISIT MOD MDM: CPT

## 2019-02-22 RX ORDER — OXYCODONE HYDROCHLORIDE AND ACETAMINOPHEN 5; 325 MG/1; MG/1
1 TABLET ORAL ONCE
Status: COMPLETED | OUTPATIENT
Start: 2019-02-22 | End: 2019-02-22

## 2019-02-22 RX ORDER — IBUPROFEN 600 MG/1
600 TABLET ORAL ONCE
Status: COMPLETED | OUTPATIENT
Start: 2019-02-22 | End: 2019-02-22

## 2019-02-22 RX ADMIN — OXYCODONE AND ACETAMINOPHEN 1 TABLET: 5; 325 TABLET ORAL at 22:52

## 2019-02-22 RX ADMIN — IBUPROFEN 600 MG: 600 TABLET ORAL at 22:52

## 2019-02-22 ASSESSMENT — PAIN DESCRIPTION - DESCRIPTORS: DESCRIPTORS: ACHING

## 2019-02-23 NOTE — ED TRIAGE NOTES
"Chief Complaint   Patient presents with   • GLF   • Low Back Pain     BIB EMS, placed in lobby, pt reports \"slipping on ice.\" and not has low back pain. Pt denies LOC and is alert and oriented. Pt able to ambulate but states it \"hurts\" to do so. Pt placed in lobby and wait times explained.   "

## 2019-02-23 NOTE — ED NOTES
Patient discharged in stable condition per orders. Wristband removed per protocol. Patient verbalized understanding of all discharge instructions. All belongings accounted for. Patient to lobby via wheelchair accompanied by ED Tech.

## 2019-02-23 NOTE — ED PROVIDER NOTES
ED Provider Note    CHIEF COMPLAINT  Chief Complaint   Patient presents with   • GLF   • Low Back Pain       HPI  Norm Prabhakar is a 38 y.o. male who presents with low back pain.  The patient states that he fell on the ice earlier today.  He presents the emergency department with pain in the low back.  He states the pain is in the midline.  He does have some radiation down into the buttocks region bilaterally.  He does not have any functional loss of his lower extremities nor paresthesias.  He is unaware of any other injuries.  He states the pain is moderate to severe in intensity and worse with certain movements.    REVIEW OF SYSTEMS  See HPI for further details. All other systems are negative.     PAST MEDICAL HISTORY  Past Medical History:   Diagnosis Date   • Seizure (Former[facility]) 2010   • Psychiatric problem 2002    PTSD   • Anxiety     BIPOLAR   • ASTHMA    • Bipolar 1 disorder (Former[facility])    • Depression    • Fall     passed out 2 wks ago   • Glaucoma    • Glaucoma 1982    both eyes/ blind on left eye   • Hypothyroidism    • Indigestion     once in a while   • Mental disorder     learning disabilities; speech impairment; developmental delays   • Murmur     since birth   • Pneumonia     remote   • S/P thyroidectomy    • Seizure disorder ([facility])    • Unspecified disorder of thyroid        FAMILY HISTORY  [unfilled]    SOCIAL HISTORY  Social History     Social History   • Marital status: Single     Spouse name: N/A   • Number of children: N/A   • Years of education: N/A     Social History Main Topics   • Smoking status: Former Smoker     Packs/day: 0.25     Types: Cigarettes, Cigars     Quit date: 5/1/2018   • Smokeless tobacco: Never Used   • Alcohol use No   • Drug use: Yes     Types: Inhaled      Comment: marijuana   • Sexual activity: Not on file     Other Topics Concern   • Not on file     Social History Narrative   • No narrative on file       SURGICAL HISTORY  Past Surgical History:   Procedure Laterality Date   • EYE  "SURGERY     • OTHER      Hernia Repair when he was 10 yrs old   • THYROID LOBECTOMY         CURRENT MEDICATIONS  Home Medications    **Home medications have not yet been reviewed for this encounter**         ALLERGIES  Allergies   Allergen Reactions   • Abilify Unspecified     \"Feeling tired, like I don't even know whats going on around me\"   • Fish      Pt reports fish causes him to be sick to his stomach         PHYSICAL EXAM  VITAL SIGNS: /83   Pulse 73   Temp 36.2 °C (97.2 °F) (Temporal)   Resp 16   Ht 1.981 m (6' 6\")   Wt (!) 141 kg (310 lb 13.6 oz)   SpO2 98%   BMI 35.92 kg/m²  Room air O2: 98    Constitutional: Mild acute distress, Non-toxic appearance.   HENT: Normocephalic, Atraumatic, Bilateral external ears normal, Oropharynx moist, No oral exudates, Nose normal.   Eyes: PERRLA, EOMI, Conjunctiva normal, No discharge.   Neck: Normal range of motion, No tenderness, Supple, No stridor.   Lymphatic: No lymphadenopathy noted.   Cardiovascular: Normal heart rate, Normal rhythm, No murmurs, No rubs, No gallops.   Thorax & Lungs: Normal breath sounds, No respiratory distress, No wheezing, No chest tenderness.   Abdomen: Bowel sounds normal, Soft, No tenderness, No masses, No pulsatile masses.   Skin: Warm, Dry, No erythema, No rash.   Back: Midline lumbar sacral pain with no step-offs.   Extremities: Intact distal pulses, No edema, No tenderness, No cyanosis, No clubbing.   Musculoskeletal: Good range of motion in all major joints. No tenderness to palpation or major deformities noted.   Neurologic: GCS of 15  Psychiatric: Affect normal, Judgment normal, Mood normal.       RADIOLOGY/PROCEDURES  DX-LUMBAR SPINE-2 OR 3 VIEWS   Final Result         1.  Grade 1 spondylolisthesis L5 on S1, probable pars defects.   2.  No acute traumatic bony injury of the lumbar spine is apparent            COURSE & MEDICAL DECISION MAKING  Pertinent Labs & Imaging studies reviewed. (See chart for details)  This a " 38-year-old male who presents the emerge department after a fall.  The x-ray of the lumbar spine does not show any acute traumatic injuries.  I suspect he suffered more from a contusion.  The patient did receive a pain pill while in the emerge department as well as Motrin.  The patient be discharged with instructions to take Motrin every 8 hours.  He will utilize ice for the first 24 hours.  The patient will follow up with his primary care provider next week if he is not better and he will return to the emerge department if he is acutely worse.  At the time of discharge he continues to be neurologically intact with no other evidence of injury.    FINAL IMPRESSION  1.  Lumbar sacral contusion    Disposition  The patient will be discharged in stable condition      Electronically signed by: Josh Randhwaa, 2/22/2019 10:46 PM

## 2019-02-23 NOTE — DISCHARGE INSTRUCTIONS
Take Motrin as discussed    Utilize ice for the first 24 hours    Return for increased pain or weakness to her lower extremities    Follow-up with your primary care provider on Monday

## 2019-03-14 ENCOUNTER — OFFICE VISIT (OUTPATIENT)
Dept: NEUROLOGY | Facility: MEDICAL CENTER | Age: 39
End: 2019-03-14
Payer: MEDICAID

## 2019-03-14 VITALS
HEART RATE: 72 BPM | RESPIRATION RATE: 16 BRPM | HEIGHT: 78 IN | OXYGEN SATURATION: 96 % | BODY MASS INDEX: 34.59 KG/M2 | WEIGHT: 299 LBS | TEMPERATURE: 97.3 F | SYSTOLIC BLOOD PRESSURE: 102 MMHG | DIASTOLIC BLOOD PRESSURE: 60 MMHG

## 2019-03-14 DIAGNOSIS — E11.9 TYPE 2 DIABETES MELLITUS WITHOUT COMPLICATION, WITHOUT LONG-TERM CURRENT USE OF INSULIN (HCC): ICD-10-CM

## 2019-03-14 DIAGNOSIS — F44.9 CONVERSION DISORDER: ICD-10-CM

## 2019-03-14 DIAGNOSIS — R56.9 SEIZURE (HCC): ICD-10-CM

## 2019-03-14 DIAGNOSIS — F31.9 BIPOLAR AFFECTIVE DISORDER, REMISSION STATUS UNSPECIFIED (HCC): ICD-10-CM

## 2019-03-14 DIAGNOSIS — R40.4 NONSPECIFIC PAROXYSMAL SPELL: ICD-10-CM

## 2019-03-14 PROCEDURE — 99215 OFFICE O/P EST HI 40 MIN: CPT | Performed by: PSYCHIATRY & NEUROLOGY

## 2019-03-14 RX ORDER — AZITHROMYCIN 250 MG/1
TABLET, FILM COATED ORAL
Refills: 0 | COMMUNITY
Start: 2019-02-06 | End: 2019-03-14

## 2019-03-14 RX ORDER — NAPROXEN 500 MG/1
TABLET ORAL
Refills: 0 | COMMUNITY
Start: 2019-03-08 | End: 2019-07-21

## 2019-03-14 RX ORDER — DEXTROMETHORPHAN HBR, GUAIFENESIN 20; 200 MG/10ML; MG/10ML
SOLUTION ORAL
Refills: 0 | COMMUNITY
Start: 2019-02-06 | End: 2019-03-14

## 2019-03-14 RX ORDER — INSULIN GLARGINE 100 [IU]/ML
INJECTION, SOLUTION SUBCUTANEOUS EVERY EVENING
COMMUNITY
End: 2019-07-21

## 2019-03-14 RX ORDER — LIDOCAINE 50 MG/G
OINTMENT TOPICAL
Refills: 0 | COMMUNITY
Start: 2019-03-08 | End: 2019-03-14

## 2019-03-14 RX ORDER — BUTALBITAL, ASPIRIN, AND CAFFEINE 325; 50; 40 MG/1; MG/1; MG/1
CAPSULE ORAL
Refills: 2 | COMMUNITY
Start: 2019-03-08 | End: 2019-03-14

## 2019-03-14 RX ORDER — PRAZOSIN HYDROCHLORIDE 2 MG/1
CAPSULE ORAL
Refills: 0 | COMMUNITY
Start: 2019-03-07 | End: 2019-07-21

## 2019-03-14 RX ORDER — ONDANSETRON 4 MG/1
4 TABLET, FILM COATED ORAL EVERY 4 HOURS PRN
COMMUNITY
End: 2019-07-21

## 2019-03-14 RX ORDER — BUTALBITAL, ACETAMINOPHEN AND CAFFEINE 50; 325; 40 MG/1; MG/1; MG/1
1 TABLET ORAL EVERY 4 HOURS PRN
COMMUNITY
End: 2019-07-21

## 2019-03-14 RX ORDER — LATANOPROST 50 UG/ML
SOLUTION/ DROPS OPHTHALMIC
Refills: 0 | COMMUNITY
Start: 2019-02-22 | End: 2019-03-14

## 2019-03-14 RX ORDER — LURASIDONE HYDROCHLORIDE 20 MG/1
TABLET, FILM COATED ORAL
Refills: 0 | COMMUNITY
Start: 2019-03-07 | End: 2019-07-21

## 2019-03-14 RX ORDER — HYDROXYZINE HYDROCHLORIDE 25 MG/1
TABLET, FILM COATED ORAL
Refills: 0 | COMMUNITY
Start: 2019-02-07 | End: 2019-07-21

## 2019-03-14 RX ORDER — LEVOTHYROXINE SODIUM 0.15 MG/1
150 TABLET ORAL
Refills: 2 | COMMUNITY
Start: 2019-03-08 | End: 2019-07-21

## 2019-03-14 RX ORDER — RANITIDINE 150 MG/1
TABLET ORAL
Refills: 0 | COMMUNITY
Start: 2019-03-07 | End: 2019-07-21

## 2019-03-14 RX ORDER — ASPIRIN 81 MG/1
81 TABLET ORAL
Refills: 5 | COMMUNITY
Start: 2019-03-08 | End: 2019-07-21

## 2019-03-14 RX ORDER — CYCLOBENZAPRINE HCL 10 MG
TABLET ORAL
Refills: 0 | COMMUNITY
Start: 2019-03-08 | End: 2019-07-21

## 2019-03-14 ASSESSMENT — ENCOUNTER SYMPTOMS
EYE DISCHARGE: 0
HALLUCINATIONS: 0
FEVER: 0
SHORTNESS OF BREATH: 0
ABDOMINAL PAIN: 0
BRUISES/BLEEDS EASILY: 0
SORE THROAT: 0
WEIGHT LOSS: 0
FALLS: 0
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
## 2019-03-24 ENCOUNTER — APPOINTMENT (OUTPATIENT)
Dept: RADIOLOGY | Facility: MEDICAL CENTER | Age: 39
End: 2019-03-24
Attending: EMERGENCY MEDICINE
Payer: MEDICAID

## 2019-03-24 ENCOUNTER — HOSPITAL ENCOUNTER (EMERGENCY)
Facility: MEDICAL CENTER | Age: 39
End: 2019-03-25
Attending: EMERGENCY MEDICINE
Payer: MEDICAID

## 2019-03-24 DIAGNOSIS — S09.90XA CLOSED HEAD INJURY, INITIAL ENCOUNTER: ICD-10-CM

## 2019-03-24 DIAGNOSIS — R55 SYNCOPE, UNSPECIFIED SYNCOPE TYPE: ICD-10-CM

## 2019-03-24 LAB
ALBUMIN SERPL BCP-MCNC: 4.3 G/DL (ref 3.2–4.9)
ALBUMIN/GLOB SERPL: 2.5 G/DL
ALP SERPL-CCNC: 52 U/L (ref 30–99)
ALT SERPL-CCNC: 10 U/L (ref 2–50)
ANION GAP SERPL CALC-SCNC: 12 MMOL/L (ref 0–11.9)
AST SERPL-CCNC: 14 U/L (ref 12–45)
BASOPHILS # BLD AUTO: 0.8 % (ref 0–1.8)
BASOPHILS # BLD: 0.08 K/UL (ref 0–0.12)
BILIRUB SERPL-MCNC: 0.3 MG/DL (ref 0.1–1.5)
BUN SERPL-MCNC: 32 MG/DL (ref 8–22)
CALCIUM SERPL-MCNC: 9.3 MG/DL (ref 8.5–10.5)
CHLORIDE SERPL-SCNC: 104 MMOL/L (ref 96–112)
CO2 SERPL-SCNC: 23 MMOL/L (ref 20–33)
CREAT SERPL-MCNC: 0.94 MG/DL (ref 0.5–1.4)
EOSINOPHIL # BLD AUTO: 0.11 K/UL (ref 0–0.51)
EOSINOPHIL NFR BLD: 1 % (ref 0–6.9)
ERYTHROCYTE [DISTWIDTH] IN BLOOD BY AUTOMATED COUNT: 44.8 FL (ref 35.9–50)
GLOBULIN SER CALC-MCNC: 1.7 G/DL (ref 1.9–3.5)
GLUCOSE SERPL-MCNC: 99 MG/DL (ref 65–99)
HCT VFR BLD AUTO: 38.7 % (ref 42–52)
HGB BLD-MCNC: 12.8 G/DL (ref 14–18)
IMM GRANULOCYTES # BLD AUTO: 0.11 K/UL (ref 0–0.11)
IMM GRANULOCYTES NFR BLD AUTO: 1 % (ref 0–0.9)
LYMPHOCYTES # BLD AUTO: 4.11 K/UL (ref 1–4.8)
LYMPHOCYTES NFR BLD: 39.2 % (ref 22–41)
MAGNESIUM SERPL-MCNC: 1.8 MG/DL (ref 1.5–2.5)
MCH RBC QN AUTO: 30.8 PG (ref 27–33)
MCHC RBC AUTO-ENTMCNC: 33.1 G/DL (ref 33.7–35.3)
MCV RBC AUTO: 93.3 FL (ref 81.4–97.8)
MONOCYTES # BLD AUTO: 1.05 K/UL (ref 0–0.85)
MONOCYTES NFR BLD AUTO: 10 % (ref 0–13.4)
NEUTROPHILS # BLD AUTO: 5.02 K/UL (ref 1.82–7.42)
NEUTROPHILS NFR BLD: 48 % (ref 44–72)
NRBC # BLD AUTO: 0 K/UL
NRBC BLD-RTO: 0 /100 WBC
PLATELET # BLD AUTO: 257 K/UL (ref 164–446)
PMV BLD AUTO: 10.2 FL (ref 9–12.9)
POTASSIUM SERPL-SCNC: 4 MMOL/L (ref 3.6–5.5)
PROT SERPL-MCNC: 6 G/DL (ref 6–8.2)
RBC # BLD AUTO: 4.15 M/UL (ref 4.7–6.1)
SODIUM SERPL-SCNC: 139 MMOL/L (ref 135–145)
WBC # BLD AUTO: 10.5 K/UL (ref 4.8–10.8)

## 2019-03-24 PROCEDURE — 84443 ASSAY THYROID STIM HORMONE: CPT

## 2019-03-24 PROCEDURE — 93005 ELECTROCARDIOGRAM TRACING: CPT | Performed by: EMERGENCY MEDICINE

## 2019-03-24 PROCEDURE — 72125 CT NECK SPINE W/O DYE: CPT

## 2019-03-24 PROCEDURE — 83735 ASSAY OF MAGNESIUM: CPT

## 2019-03-24 PROCEDURE — 99284 EMERGENCY DEPT VISIT MOD MDM: CPT

## 2019-03-24 PROCEDURE — 71045 X-RAY EXAM CHEST 1 VIEW: CPT

## 2019-03-24 PROCEDURE — 84484 ASSAY OF TROPONIN QUANT: CPT

## 2019-03-24 PROCEDURE — 80053 COMPREHEN METABOLIC PANEL: CPT

## 2019-03-24 PROCEDURE — 70450 CT HEAD/BRAIN W/O DYE: CPT

## 2019-03-24 PROCEDURE — 84439 ASSAY OF FREE THYROXINE: CPT

## 2019-03-24 PROCEDURE — 80164 ASSAY DIPROPYLACETIC ACD TOT: CPT

## 2019-03-24 PROCEDURE — 85025 COMPLETE CBC W/AUTO DIFF WBC: CPT

## 2019-03-25 VITALS
WEIGHT: 313.05 LBS | SYSTOLIC BLOOD PRESSURE: 118 MMHG | DIASTOLIC BLOOD PRESSURE: 68 MMHG | HEIGHT: 78 IN | BODY MASS INDEX: 36.22 KG/M2 | TEMPERATURE: 97.6 F | OXYGEN SATURATION: 95 % | RESPIRATION RATE: 16 BRPM | HEART RATE: 80 BPM

## 2019-03-25 LAB
EKG IMPRESSION: NORMAL
T4 FREE SERPL-MCNC: 0.87 NG/DL (ref 0.53–1.43)
TROPONIN I SERPL-MCNC: <0.01 NG/ML (ref 0–0.04)
TSH SERPL DL<=0.005 MIU/L-ACNC: 32.78 UIU/ML (ref 0.38–5.33)
VALPROATE SERPL-MCNC: 95.5 UG/ML (ref 50–100)

## 2019-03-25 NOTE — ED NOTES
All lines and monitors discontinued. Discharge instructions given, questions answered.    ambulatory out of ER, escorted by self.  Instructed not to drive after taking pain medication and pt verbalizes understanding.

## 2019-03-25 NOTE — ED TRIAGE NOTES
Pt BIBA from [facility].  PT states he had (2) episodes of being dizzy when standing up.  Pt states he might have passed out the second time.  Pt complaining of generalized soreness to his neck.  Pt states that has happened several times in the past but no one knows why.

## 2019-03-25 NOTE — ED PROVIDER NOTES
"ED Provider Note    CHIEF COMPLAINT  Chief Complaint   Patient presents with   • Dizziness   • Syncope        [facility]    Primary care provider: ANIKET Chung   History obtained from: Patient  History limited by: None     Norm Prabhakar is a 38 y.o. male who presents to the ED by EMS from his assisted living facility for syncope and head injury shortly prior to arrival.  Patient states that he had gotten up in preparation to go to \"monster jam\" when he felt dizzy and sat back down.  He told the staff at his assisted living facility that he was not feeling well and they told him to lay down and get some rest.  When he tried to get up to use the restroom patient states that his \"vision got dark\" and he apparently passed out and hit the back of his head.  He noticed a \"knot\" on the back of his head and is reporting severe headache as well as some neck pain and feeling dizzy and lightheaded.  He denies any other injuries or pain anywhere else.  Patient reports 3 episodes of nausea and vomiting since the injury.  He denies any recent fever.  He states that he has cough at times due to his asthma and smoking which is unchanged.  He has had diarrhea for the past couple of days.  He denies any dysuria or incontinence.  He denies any focal weakness or sensory change.  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  Patient states that he has been taking all his medications as prescribed including his seizure medications and does not feel that he had a seizure tonight.  Patient reports past syncopal episodes.    REVIEW OF SYSTEMS  Please see HPI for pertinent positives/negatives.  All other systems reviewed and are negative.     PAST MEDICAL HISTORY  Past Medical History:   Diagnosis Date   • Seizure (HCC) 2010   • Psychiatric problem 2002    PTSD   • Anxiety     BIPOLAR   • ASTHMA    • Bipolar 1 disorder (HCC)    • Depression    • Fall     passed out 2 wks ago   • Glaucoma    • Glaucoma 1982    " "both eyes/ blind on left eye   • Hypothyroidism    • Indigestion     once in a while   • Mental disorder     learning disabilities; speech impairment; developmental delays   • Murmur     since birth   • Pneumonia     remote   • S/P thyroidectomy    • Seizure disorder (HCC)    • Unspecified disorder of thyroid         SURGICAL HISTORY  Past Surgical History:   Procedure Laterality Date   • EYE SURGERY     • OTHER      Hernia Repair when he was 10 yrs old   • THYROID LOBECTOMY          SOCIAL HISTORY  Social History     Social History Main Topics   • Smoking status: Former Smoker     Packs/day: 0.25     Types: Cigarettes, Cigars     Quit date: 5/1/2018   • Smokeless tobacco: Never Used   • Alcohol use No   • Drug use: Yes     Types: Inhaled      Comment: marijuana   • Sexual activity: Not on file        FAMILY HISTORY  Family History   Problem Relation Age of Onset   • Hypertension Mother    • Heart Disease Mother    • Lung Disease Mother    • Stroke Maternal Grandmother         CURRENT MEDICATIONS  Home Medications    **Home medications have not yet been reviewed for this encounter**          ALLERGIES  Allergies   Allergen Reactions   • Abilify Unspecified     \"Feeling tired, like I don't even know whats going on around me\"   • Fish      Pt reports fish causes him to be sick to his stomach          PHYSICAL EXAM  VITAL SIGNS: /68   Pulse 80   Temp 36.4 °C (97.6 °F) (Tympanic)   Resp 16   Ht 1.981 m (6' 6\")   Wt (!) 142 kg (313 lb 0.9 oz)   SpO2 95%   BMI 36.18 kg/m²  @NGHIA[[id number]::@     Pulse ox interpretation: 97% I interpret this pulse ox as normal     Cardiac monitor interpretation: Sinus rhythm with heart rate in the 70s as interpreted by me.  The patient presented with syncope and cardiac monitor was ordered to monitor for dysrhythmia.    Constitutional: Well developed, well nourished, alert in no apparent distress, nontoxic appearance    HENT: Slight occipital swelling with tenderness to palpation, " no crepitus/bruising/step-off, normocephalic, oropharynx moist and clear, nose normal    Eyes: Left eye with chronic appearing changes and blindness, right eye vision grossly intact and EOMI, no discharge, no icterus    Neck: Soft and supple, trachea midline, no stridor, mild diffuse tenderness to palpation posteriorly, no LAD, no JVD, good ROM    Cardiovascular: Regular rate and rhythm, no murmurs/rubs/gallops, strong distal pulses and good perfusion    Thorax & Lungs: No respiratory distress, CTAB   Abdomen: Soft, nontender, nondistended, no guarding, no rebound, normal BS    Back: Nontender to palpation  Extremities: No cyanosis, no edema, no gross deformity, good ROM, no tenderness, intact distal pulses with brisk cap refill    Skin: Warm, dry, no pallor/cyanosis, no rash noted    Lymphatic: No lymphadenopathy noted    Neuro: Alert and oriented to person, place, and time.  GCS 15.  CN II-XII grossly intact.  Normal speech.  Equal strength bilateral UE/LE.  Sensation intact to touch.  No cerebellar signs.    Psychiatric: Cooperative         DIAGNOSTIC STUDIES / PROCEDURES    EKG  12 Lead EKG obtained at 2335 and interpreted by me:   Rate: 78   Rhythm: Sinus rhythm   Ectopy: None  Intervals: IVCD  Axis: Normal   QRS: Late precordial R wave transition  ST segments: Normal  T Waves: Normal    Clinical Impression: Sinus rhythm with IVCD, no acute ischemic changes or dysrhythmia  Compared to December 30, 2018      LABS  All labs reviewed by me.     Results for orders placed or performed during the hospital encounter of 03/24/19   CBC WITH DIFFERENTIAL   Result Value Ref Range    WBC 10.5 4.8 - 10.8 K/uL    RBC 4.15 (L) 4.70 - 6.10 M/uL    Hemoglobin 12.8 (L) 14.0 - 18.0 g/dL    Hematocrit 38.7 (L) 42.0 - 52.0 %    MCV 93.3 81.4 - 97.8 fL    MCH 30.8 27.0 - 33.0 pg    MCHC 33.1 (L) 33.7 - 35.3 g/dL    RDW 44.8 35.9 - 50.0 fL    Platelet Count 257 164 - 446 K/uL    MPV 10.2 9.0 - 12.9 fL    Neutrophils-Polys 48.00 44.00  - 72.00 %    Lymphocytes 39.20 22.00 - 41.00 %    Monocytes 10.00 0.00 - 13.40 %    Eosinophils 1.00 0.00 - 6.90 %    Basophils 0.80 0.00 - 1.80 %    Immature Granulocytes 1.00 (H) 0.00 - 0.90 %    Nucleated RBC 0.00 /100 WBC    Neutrophils (Absolute) 5.02 1.82 - 7.42 K/uL    Lymphs (Absolute) 4.11 1.00 - 4.80 K/uL    Monos (Absolute) 1.05 (H) 0.00 - 0.85 K/uL    Eos (Absolute) 0.11 0.00 - 0.51 K/uL    Baso (Absolute) 0.08 0.00 - 0.12 K/uL    Immature Granulocytes (abs) 0.11 0.00 - 0.11 K/uL    NRBC (Absolute) 0.00 K/uL   COMP METABOLIC PANEL   Result Value Ref Range    Sodium 139 135 - 145 mmol/L    Potassium 4.0 3.6 - 5.5 mmol/L    Chloride 104 96 - 112 mmol/L    Co2 23 20 - 33 mmol/L    Anion Gap 12.0 (H) 0.0 - 11.9    Glucose 99 65 - 99 mg/dL    Bun 32 (H) 8 - 22 mg/dL    Creatinine 0.94 0.50 - 1.40 mg/dL    Calcium 9.3 8.5 - 10.5 mg/dL    AST(SGOT) 14 12 - 45 U/L    ALT(SGPT) 10 2 - 50 U/L    Alkaline Phosphatase 52 30 - 99 U/L    Total Bilirubin 0.3 0.1 - 1.5 mg/dL    Albumin 4.3 3.2 - 4.9 g/dL    Total Protein 6.0 6.0 - 8.2 g/dL    Globulin 1.7 (L) 1.9 - 3.5 g/dL    A-G Ratio 2.5 g/dL   TROPONIN   Result Value Ref Range    Troponin I <0.01 0.00 - 0.04 ng/mL   MAGNESIUM   Result Value Ref Range    Magnesium 1.8 1.5 - 2.5 mg/dL   TSH   Result Value Ref Range    TSH 32.780 (H) 0.380 - 5.330 uIU/mL   VALPROIC ACID   Result Value Ref Range    Valproic Acid 95.5 50.0 - 100.0 ug/mL   FREE THYROXINE   Result Value Ref Range    Free T-4 0.87 0.53 - 1.43 ng/dL   ESTIMATED GFR   Result Value Ref Range    GFR If African American >60 >60 mL/min/1.73 m 2    GFR If Non African American >60 >60 mL/min/1.73 m 2   EKG (NOW)   Result Value Ref Range    Report       [facility] Emergency Dept.    Test Date:  2019  Pt Name:    HOLLY KIM                 Department: ER  MRN:        [id number]                      Room:        17  Gender:     Male                         Technician: [id number]  :         1980                   Requested By:BALJEET MODI  Order #:    [id number]                    Reading MD: Baljeet Modi    Measurements  Intervals                                Axis  Rate:       78                           P:          51  DC:         180                          QRS:        39  QRSD:       112                          T:          54  QT:         372  QTc:        424    Interpretive Statements  SINUS RHYTHM  NONSPECIFIC INTRAVENTRICULAR CONDUCTION DELAY  LOW VOLTAGE IN FRONTAL LEADS  Compared to ECG 12/30/2018 23:37:29  Low QRS voltage now present    Electronically Signed On 3- 5:46:01 PDT by Baljeet Modi          RADIOLOGY  The radiologist's interpretation of all radiological studies have been reviewed by me.     CT-HEAD W/O   Final Result      No acute intracranial abnormality.               INTERPRETING LOCATION:  [address], [place], [address]      CT-CSPINE WITHOUT PLUS RECONS   Final Result      No acute fracture or traumatic subluxation.      DX-CHEST-PORTABLE (1 VIEW)   Final Result      No acute cardiopulmonary abnormality.             COURSE & MEDICAL DECISION MAKING  Nursing notes, VS, PMSFHx reviewed in chart.     Review of past medical records shows the patient was last seen in this ED February 22, 2019 due to low back pain status post fall.  He was last admitted to this hospital December 30, 2019 after being seen by me in the ED for possible CVA.  During his admission, MRI of the brain did not show any evidence of acute CVA.  He was evaluated by neurology and felt that patient's symptoms are likely from conversion disorder.  Patient was discharged to inpatient rehab facility January 3, 2019.      Differential diagnoses considered include but are not limited to: Syncope, near syncope, hypoglycemia, Sz, vasovagal episode, dysrhythmia, CVA/TIA, electrolyte abnormality, contusion, concussion/post-concussion syndrome, Fx, intracranial hemorrhage, cervical strain/sprain       History and  physical exam as above.  EKG without significant change compared to prior.  Lab abnormalities appear chronic and stable compared to prior results.  Imaging studies without evidence of acute abnormality as above.  Findings discussed with the patient.  He is noted to be resting comfortably in no acute distress and nontoxic in appearance.  No focal neurological findings on exam.  Patient has been hemodynamically stable.  Record review shows patient has had extensive cardiac and neuro workup.  Low clinical suspicion at this time for an acute serious pathology for his syncope given the history/exam/findings.  However, discussed with patient worrisome signs and symptoms and return to ED precautions and he was advised on outpatient follow-up.  Patient verbalized understanding and agreed with plan of care with no further questions or concerns.      The patient is referred to a primary physician for blood pressure management, diabetic screening, and for all other preventative health concerns.       FINAL IMPRESSION  1. Syncope, unspecified syncope type Acute   2. Closed head injury, initial encounter Acute          DISPOSITION  Patient will be discharged home in stable condition.       FOLLOW UP  [facility]  [address]  [place] [address]  [phone]    Call today      [facility], Emergency Dept  [address] [facility] [address]  [phone]    If symptoms worsen         OUTPATIENT MEDICATIONS  Discharge Medication List as of 3/25/2019  1:19 AM             Electronically signed by: Juan F Hendricks, 3/24/2019 10:48 PM      Portions of this record were made with voice recognition software.  Despite my review, spelling/grammar/context errors may still remain.  Interpretation of this chart should be taken in this context.

## 2019-04-01 ENCOUNTER — HOSPITAL ENCOUNTER (EMERGENCY)
Facility: MEDICAL CENTER | Age: 39
End: 2019-04-01
Attending: EMERGENCY MEDICINE
Payer: MEDICAID

## 2019-04-01 VITALS
HEIGHT: 78 IN | HEART RATE: 96 BPM | BODY MASS INDEX: 34.89 KG/M2 | TEMPERATURE: 97.8 F | RESPIRATION RATE: 18 BRPM | DIASTOLIC BLOOD PRESSURE: 79 MMHG | OXYGEN SATURATION: 95 % | SYSTOLIC BLOOD PRESSURE: 142 MMHG | WEIGHT: 301.59 LBS

## 2019-04-01 DIAGNOSIS — G43.019 INTRACTABLE MIGRAINE WITHOUT AURA AND WITHOUT STATUS MIGRAINOSUS: ICD-10-CM

## 2019-04-01 PROCEDURE — 700111 HCHG RX REV CODE 636 W/ 250 OVERRIDE (IP): Performed by: EMERGENCY MEDICINE

## 2019-04-01 PROCEDURE — 96372 THER/PROPH/DIAG INJ SC/IM: CPT

## 2019-04-01 PROCEDURE — 99283 EMERGENCY DEPT VISIT LOW MDM: CPT

## 2019-04-01 RX ORDER — DIPHENHYDRAMINE HYDROCHLORIDE 50 MG/ML
12.5 INJECTION INTRAMUSCULAR; INTRAVENOUS ONCE
Status: COMPLETED | OUTPATIENT
Start: 2019-04-01 | End: 2019-04-01

## 2019-04-01 RX ORDER — METOCLOPRAMIDE HYDROCHLORIDE 5 MG/ML
10 INJECTION INTRAMUSCULAR; INTRAVENOUS ONCE
Status: COMPLETED | OUTPATIENT
Start: 2019-04-01 | End: 2019-04-01

## 2019-04-01 RX ORDER — KETOROLAC TROMETHAMINE 30 MG/ML
30 INJECTION, SOLUTION INTRAMUSCULAR; INTRAVENOUS ONCE
Status: COMPLETED | OUTPATIENT
Start: 2019-04-01 | End: 2019-04-01

## 2019-04-01 RX ADMIN — METOCLOPRAMIDE 10 MG: 5 INJECTION, SOLUTION INTRAMUSCULAR; INTRAVENOUS at 16:58

## 2019-04-01 RX ADMIN — KETOROLAC TROMETHAMINE 30 MG: 30 INJECTION, SOLUTION INTRAMUSCULAR; INTRAVENOUS at 16:58

## 2019-04-01 RX ADMIN — DIPHENHYDRAMINE HYDROCHLORIDE 12.5 MG: 50 INJECTION INTRAMUSCULAR; INTRAVENOUS at 16:59

## 2019-04-01 NOTE — ED TRIAGE NOTES
Chief Complaint   Patient presents with   • Headache   Pt to triage in NAD.  Pt reports headache x 4 days.  Pt reports hx of migraine headaches but this one is worse.  Neuro WDL.  Pt educated on triage process and instructed to notify triage RN of any change in status.

## 2019-04-01 NOTE — ED PROVIDER NOTES
ED Provider Note    Scribed for Herb Ortiz M.D. by Billy Nelson. 4/1/2019  4:35 PM    Primary care provider: ANIKET Chung  Means of arrival: Walk in  History obtained from: Patient  History limited by: None    CHIEF COMPLAINT  Chief Complaint   Patient presents with   • Headache       HPI  Norm Prabhakar is a 38 y.o. male who presents to the Emergency Department for worsening headache for the last four days. Patient has a history of chronic migraines. He reports to have 6 episodes of migraines monthly and are constant in duration. He is managed by his PCP and neurologist and has been given medications. He states that he comes to the ED multiple times for this same complaint. Patient will be seeing his PCP on 4/4/19 and had last seen her on 3/14/19. He denies numbness, tingling weakness, fever or chills    REVIEW OF SYSTEMS  Pertinent negatives include no numbness, tingling weakness, fever or chills. As above, all other systems reviewed and are negative.   See HPI for further details.     PAST MEDICAL HISTORY   has a past medical history of Anxiety; ASTHMA; Bipolar 1 disorder (HCC); Depression; Fall; Glaucoma; Glaucoma (1982); Hypothyroidism; Indigestion; Mental disorder; Murmur; Pneumonia; Psychiatric problem (2002); S/P thyroidectomy; Seizure (HCC) (2010); Seizure disorder ([facility]); and Unspecified disorder of thyroid.    SURGICAL HISTORY   has a past surgical history that includes eye surgery; thyroid lobectomy; and other.    SOCIAL HISTORY  Social History   Substance Use Topics   • Smoking status: Former Smoker     Packs/day: 0.25     Types: Cigarettes, Cigars     Quit date: 5/1/2018   • Smokeless tobacco: Never Used   • Alcohol use No      History   Drug Use   • Types: Inhaled     Comment: marijuana       FAMILY HISTORY  Family History   Problem Relation Age of Onset   • Hypertension Mother    • Heart Disease Mother    • Lung Disease Mother    • Stroke Maternal Grandmother        CURRENT  "MEDICATIONS  Home Medications     Reviewed by Jacinta Hernandez R.N. (Registered Nurse) on 04/01/19 at 1622  Med List Status: <None>   Medication Last Dose Status   acetaminophen/caffeine/butalbital 325-40-50 mg (FIORICET) -40 MG Tab  Active   ASPIRIN ADULT LOW STRENGTH 81 MG EC tablet  Active   atorvastatin (LIPITOR) 80 MG tablet  Active   busPIRone (BUSPAR) 15 MG tablet  Active   Cholecalciferol ([employer] D3) 2000 UNIT Cap  Active   cyclobenzaprine (FLEXERIL) 10 MG Tab  Active   divalproex (DEPAKOTE) 500 MG Tablet Delayed Response  Active   glimepiride (AMARYL) 4 MG Tab  Active   hydrOXYzine HCl (ATARAX) 25 MG Tab  Active   Insulin Glargine (BASAGLAR KWIKPEN) 100 UNIT/ML Solution Pen-injector  Active   LATUDA 20 MG Tab  Active   levothyroxine (SYNTHROID) 150 MCG Tab  Active   metFORMIN (GLUCOPHAGE) 1000 MG tablet  Active   montelukast (SINGULAIR) 10 MG Tab  Active   naproxen (NAPROSYN) 500 MG Tab  Active   ondansetron (ZOFRAN) 4 MG Tab tablet  Active   prazosin (MINIPRESS) 2 MG Cap  Active   raNITidine (ZANTAC) 150 MG Tab  Active   sertraline (ZOLOFT) 100 MG Tab  Active                ALLERGIES  Allergies   Allergen Reactions   • Abilify Unspecified     \"Feeling tired, like I don't even know whats going on around me\"   • Fish      Pt reports fish causes him to be sick to his stomach         PHYSICAL EXAM  VITAL SIGNS: /76   Pulse 95   Temp 36.6 °C (97.8 °F) (Temporal)   Resp 16   Ht 1.981 m (6' 6\")   Wt (!) 136.8 kg (301 lb 9.4 oz)   SpO2 95%   BMI 34.85 kg/m²   Constitutional: Well developed, Well nourished, No acute distress, Non-toxic appearance.   HENT: Normocephalic, Atraumatic, Bilateral external ears normal, Oropharynx is clear mucous membranes are moist. No oral exudates or nasal discharge.   Eyes: Pupils are equal round and reactive, EOMI, Conjunctiva normal, No discharge.   Neck: Normal range of motion, No tenderness, Supple, No stridor. No meningismus.  Lymphatic: No lymphadenopathy " noted.   Cardiovascular: Regular rate and rhythm without murmur rub or gallop.  Thorax & Lungs: Clear breath sounds bilaterally without wheezes, rhonchi or rales. There is no chest wall tenderness.   Abdomen: Soft non-tender non-distended. There is no rebound or guarding. No organomegaly is appreciated. Bowel sounds are normal.  Skin: Normal without rash.   Back: No CVA or spinal tenderness.   Extremities: Intact distal pulses, No edema, No tenderness, No cyanosis, No clubbing. Capillary refill is less than 2 seconds.  Musculoskeletal: Good range of motion in all major joints. No tenderness to palpation or major deformities noted.   Neurologic: Alert & oriented x 3, Normal motor function, Normal sensory function, No focal deficits noted. Reflexes are normal.  Psychiatric: Affect normal, Judgment normal, Mood normal. There is no suicidal ideation or patient reported hallucinations.     COURSE & MEDICAL DECISION MAKING  Nursing notes, VS, PMSFHx reviewed in chart.    4:35 PM Patient seen and examined at bedside.  Patient does not have any severe hypertension or signs of meningitis and I doubt subarachnoid hemorrhage.  I looked over his headache history which is extensive.  He is had 23 ER visits in the past year.    Patient has had high blood pressure while in the emergency department, felt likely secondary to medical condition. Counseled patient to monitor blood pressure at home and follow up with primary care physician.      The patient will return for new or worsening symptoms and is stable at the time of discharge.    Prior records obtained and reviewed: Patient has had 23 visits to the emergency room in one years time.  Seems to be improved after medication.  He is looking forward to going home and watched in the [facility] game.  He seems to be stable at discharge and will return if any significant change in symptoms but he will talk to his primary care provider about chronic headache management and possible  referral to a headache specialist    DISPOSITION:  Patient will be discharged home in stable condition.    FINAL IMPRESSION  1. Intractable migraine without aura and without status migrainosus          Billy ROB (Scribe), am scribing for, and in the presence of, Herb Ortiz M.D..    Electronically signed by: Billy Nelson (Scribe), 4/1/2019    Herb ROB M.D. personally performed the services described in this documentation, as scribed by Billy Nelson in my presence, and it is both accurate and complete.    The note accurately reflects work and decisions made by me.  Herb Ortiz  4/1/2019  4:53 PM

## 2019-05-18 NOTE — ED PROVIDER NOTES
ED Provider Note    Scribed for Dr. Dorian Corona M.D. by Sudhakar Lester. 8/9/2017, 1:09 AM.    Primary Care Provider: Long Linares M.D.  Means of arrival: Walk In   History obtained from: Patient  History limited by: None    CHIEF COMPLAINT  Chief Complaint   Patient presents with   • Arm Injury   • Shoulder Injury   • Neck Pain     HPI  Norm Prabhakar is a 37 y.o. male who presents to the Emergency Department due to a ground level fall. The patient was at work when he slipped on a wet floor and fel onto his back. The patient denies any loss of consciousness, but hit the back of his head when he fell. Since his fall, the patient complains of left elbow pain, left shoulder pain, and neck pain. After his fall, the patient was able to ambulate and finish his shift at work before he came to this ED. The patient rates his left shoulder pain as his greatest complaint of pain. The patient denies any anticoagulant use. He denies any nausea, vomiting, diarrhea, abdominal pain, or numbness.     REVIEW OF SYSTEMS  Pertinent positives include left elbow pain, left shoulder pain, neck pain. Pertinent negatives include no nausea, vomiting, diarrhea, abdominal pain, numbness.     PAST MEDICAL HISTORY   has a past medical history of ASTHMA; Glaucoma; Hypothyroidism; Depression; Anxiety; Seizure disorder (CMS-HCC); Bipolar 1 disorder (CMS-HCC); S/P thyroidectomy; Murmur; Fall; Glaucoma (1982); Psychiatric problem (2002); Mental disorder; Seizure (CMS-HCC) (2010); Pneumonia; Indigestion; and Unspecified disorder of thyroid.    SOCIAL HISTORY  Social History   Substance Use Topics   • Smoking status: Former Smoker -- 0.25 packs/day for 4 years     Types: Cigarettes     Quit date: 01/01/2014   • Smokeless tobacco: Never Used   • Alcohol Use: No      History   Drug Use   • Yes     Comment: pot     SURGICAL HISTORY   has past surgical history that includes eye surgery; thyroid lobectomy; and other.     CURRENT MEDICATIONS  Home  "Medications     Reviewed by Luz Osuna R.N. (Registered Nurse) on 08/09/17 at 0128  Med List Status: Not Addressed    Medication Last Dose Status    ALBUTEROL INH 5/27/2017 Active    divalproex ER (DEPAKOTE ER) 500 MG TABLET SR 24 HR 5/27/2017 Active    latanoprost (XALATAN) 0.005 % Solution 5/27/2017 Active    levothyroxine (SYNTHROID) 125 MCG TABS 5/27/2017 Active    prazosin (MINIPRESS) 2 MG Cap 5/27/2017 Active    sertraline (ZOLOFT) 100 MG TABS 5/27/2017 Active              ALLERGIES  Allergies   Allergen Reactions   • Abilify Unspecified     \"Feeling tired, like I don't even know whats going on around me\"   • Fish        PHYSICAL EXAM  VITAL SIGNS: /50 mmHg  Pulse 75  Temp(Src) 36.3 °C (97.4 °F)  Resp 16  Ht 1.981 m (6' 5.99\")  Wt 139.8 kg (308 lb 3.3 oz)  BMI 35.62 kg/m2  SpO2 95%    Pulse ox interpretation: Normal.   Constitutional: Well developed, Well nourished, No acute distress, Non-toxic appearance.   HENT: Normocephalic, Atraumatic, Bilateral external ears normal, Oropharynx moist, No oral exudates, Nose normal. No hemotympanum. Diffuse neck pain involving the midline.  Eyes: PERRLA, EOMI, Conjunctiva normal, No discharge.   Cardiovascular: Normal heart rate, Normal rhythm, No murmurs, No rubs, No gallops.   Thorax & Lungs: Normal breath sounds, No respiratory distress, No wheezing, No chest tenderness.   Abdomen: Bowel sounds normal, Soft, No tenderness, No masses, No pulsatile masses.   Skin: Warm, Dry, No erythema, No rash.  Extremities: Tenderness to the left shoulder and left elbow without obvious bony abnormalities, neurovascular intact distally. Intact distal pulses, No edema, No tenderness, No cyanosis, No clubbing.   Neurologic: Alert & oriented x 3, Normal motor function, Normal sensory function, No focal deficits noted.     LABS  Labs Reviewed - No data to display  All labs reviewed by me.    RADIOLOGY  CT-CSPINE WITHOUT PLUS RECONS   Final Result      1.  No acute " abnormality identified.   2.  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 This would be an unusual appearance for brachial cleft cysts or lymphangiomas or cystic carcinoma. Further assessment can be performed with ultrasound      DX-ELBOW-COMPLETE 3+ LEFT   Final Result      No radiographic evidence of acute traumatic injury.      DX-SHOULDER 2+ LEFT   Final Result      No radiographic evidence of acute traumatic injury.        The radiologist's interpretation of all radiological studies have been reviewed by me.    COURSE & MEDICAL DECISION MAKING  Nursing notes, VS, PMSFHx reviewed in chart.    1:09 AM - Patient seen and examined at bedside. Ordered Left Shoulder X Ray, CT C Spine, Left Elbow X Ray to evaluate his symptoms.       Decision Making:  This is a 37 y.o. year old who presents with neck pain, left shoulder pain, left elbow pain following a fall onto his back at work. The patient primarily is complaining of left elbow and left shoulder pain without any obvious bony deformity. He does have midline neck pain as well. No significant headache. X-rays were performed that were unremarkable for acute process explaining the patient's symptoms at this time. Most likely a musculoskeletal shoulder pain. May be a possible biceps tendon irritation or rotator cuff injury. No obvious fracture to x-ray of the shoulder or elbow.    Cannot rule rule out whether or not the patient had a concussion however does not appear to have had a significant injury to cause intracranial hemorrhage or skull fracture. X-rays of his limb were unremarkable without evidence of fracture or dislocation. CT cervical spine did not show any obvious fracture either. Patient is fully ambulatory.    Patient appears stable for discharge and instructed to follow-up with his primary care physician/Work[employer] for further management.    The patient will return for new or worsening symptoms and is stable at the time of discharge.  Patient was given return precautions. Patient and/or family member verbalizes understanding and will comply.    DISPOSITION:  Patient will be discharged home in stable condition.    FOLLOW UP:  Long Linares M.D.  [address]  [address]  [facility] [place] [address]  [phone]          [facility], Emergency Dept  [address] [id number] [phone]    As needed, If symptoms worsen    [facility]  [address] [address]  [phone]          .vit    OUTPATIENT MEDICATIONS:    Discharge Medication List as of 8/9/2017  2:44 AM      START taking these medications    Details   naproxen (NAPROSYN) 500 MG Tab Take 1 Tab by mouth 2 times a day, with meals for 7 days., Disp-14 Tab, R-0, Print Rx Paper             FINAL IMPRESSION  1. Fall, initial encounter    2. Musculoskeletal pain        This dictation has been created using voice recognition software and/or scribes. The accuracy of the dictation is limited by the abilities of the software and the expertise of the scribes. I expect there may be some errors of grammar and possibly content. I made every attempt to manually correct the errors within my dictation. However, errors related to voice recognition software and/or scribes may still exist and should be interpreted within the appropriate context.    The note accurately reflects work and decisions made by me.  Dorian Corona  8/9/2017  5:05 AM    18-May-2019 16:48

## 2019-06-04 ENCOUNTER — HOSPITAL ENCOUNTER (EMERGENCY)
Facility: MEDICAL CENTER | Age: 39
End: 2019-06-04
Attending: EMERGENCY MEDICINE
Payer: MEDICAID

## 2019-06-04 VITALS
BODY MASS INDEX: 38.19 KG/M2 | HEART RATE: 82 BPM | SYSTOLIC BLOOD PRESSURE: 148 MMHG | DIASTOLIC BLOOD PRESSURE: 75 MMHG | RESPIRATION RATE: 14 BRPM | OXYGEN SATURATION: 95 % | TEMPERATURE: 98.2 F | WEIGHT: 315 LBS

## 2019-06-04 DIAGNOSIS — R73.9 HYPERGLYCEMIA: ICD-10-CM

## 2019-06-04 LAB
ALBUMIN SERPL BCP-MCNC: 4.4 G/DL (ref 3.2–4.9)
ALBUMIN/GLOB SERPL: 1.8 G/DL
ALP SERPL-CCNC: 56 U/L (ref 30–99)
ALT SERPL-CCNC: 19 U/L (ref 2–50)
ANION GAP SERPL CALC-SCNC: 11 MMOL/L (ref 0–11.9)
AST SERPL-CCNC: 20 U/L (ref 12–45)
BILIRUB SERPL-MCNC: 0.4 MG/DL (ref 0.1–1.5)
BUN SERPL-MCNC: 19 MG/DL (ref 8–22)
CALCIUM SERPL-MCNC: 9.6 MG/DL (ref 8.5–10.5)
CHLORIDE SERPL-SCNC: 100 MMOL/L (ref 96–112)
CO2 SERPL-SCNC: 28 MMOL/L (ref 20–33)
CREAT SERPL-MCNC: 0.96 MG/DL (ref 0.5–1.4)
GLOBULIN SER CALC-MCNC: 2.4 G/DL (ref 1.9–3.5)
GLUCOSE BLD-MCNC: 354 MG/DL (ref 65–99)
GLUCOSE SERPL-MCNC: 330 MG/DL (ref 65–99)
POTASSIUM SERPL-SCNC: 4.2 MMOL/L (ref 3.6–5.5)
PROT SERPL-MCNC: 6.8 G/DL (ref 6–8.2)
SODIUM SERPL-SCNC: 139 MMOL/L (ref 135–145)

## 2019-06-04 PROCEDURE — 99284 EMERGENCY DEPT VISIT MOD MDM: CPT

## 2019-06-04 PROCEDURE — 80053 COMPREHEN METABOLIC PANEL: CPT

## 2019-06-04 PROCEDURE — 82962 GLUCOSE BLOOD TEST: CPT

## 2019-06-04 PROCEDURE — 36415 COLL VENOUS BLD VENIPUNCTURE: CPT

## 2019-06-04 PROCEDURE — 700105 HCHG RX REV CODE 258: Performed by: EMERGENCY MEDICINE

## 2019-06-04 RX ORDER — SODIUM CHLORIDE, SODIUM LACTATE, POTASSIUM CHLORIDE, CALCIUM CHLORIDE 600; 310; 30; 20 MG/100ML; MG/100ML; MG/100ML; MG/100ML
1000 INJECTION, SOLUTION INTRAVENOUS ONCE
Status: COMPLETED | OUTPATIENT
Start: 2019-06-04 | End: 2019-06-04

## 2019-06-04 RX ADMIN — SODIUM CHLORIDE, POTASSIUM CHLORIDE, SODIUM LACTATE AND CALCIUM CHLORIDE 1000 ML: 600; 310; 30; 20 INJECTION, SOLUTION INTRAVENOUS at 20:55

## 2019-06-05 ENCOUNTER — HOSPITAL ENCOUNTER (EMERGENCY)
Facility: MEDICAL CENTER | Age: 39
End: 2019-06-05
Attending: EMERGENCY MEDICINE
Payer: MEDICAID

## 2019-06-05 VITALS
BODY MASS INDEX: 36.45 KG/M2 | RESPIRATION RATE: 12 BRPM | WEIGHT: 315 LBS | SYSTOLIC BLOOD PRESSURE: 147 MMHG | DIASTOLIC BLOOD PRESSURE: 92 MMHG | HEIGHT: 78 IN | HEART RATE: 74 BPM | OXYGEN SATURATION: 97 % | TEMPERATURE: 97.9 F

## 2019-06-05 DIAGNOSIS — R73.9 HYPERGLYCEMIA: ICD-10-CM

## 2019-06-05 DIAGNOSIS — R11.2 NON-INTRACTABLE VOMITING WITH NAUSEA, UNSPECIFIED VOMITING TYPE: ICD-10-CM

## 2019-06-05 LAB
ALBUMIN SERPL BCP-MCNC: 4.5 G/DL (ref 3.2–4.9)
ALBUMIN/GLOB SERPL: 2.3 G/DL
ALP SERPL-CCNC: 57 U/L (ref 30–99)
ALT SERPL-CCNC: 19 U/L (ref 2–50)
ANION GAP SERPL CALC-SCNC: 11 MMOL/L (ref 0–11.9)
ANISOCYTOSIS BLD QL SMEAR: ABNORMAL
AST SERPL-CCNC: 20 U/L (ref 12–45)
B-OH-BUTYR SERPL-MCNC: 0.26 MMOL/L (ref 0.02–0.27)
BASE EXCESS BLDV CALC-SCNC: 1 MMOL/L
BASOPHILS # BLD AUTO: 0.8 % (ref 0–1.8)
BASOPHILS # BLD: 0.07 K/UL (ref 0–0.12)
BILIRUB SERPL-MCNC: 0.3 MG/DL (ref 0.1–1.5)
BODY TEMPERATURE: NORMAL CENTIGRADE
BUN SERPL-MCNC: 20 MG/DL (ref 8–22)
CALCIUM SERPL-MCNC: 9.4 MG/DL (ref 8.5–10.5)
CHLORIDE SERPL-SCNC: 98 MMOL/L (ref 96–112)
CO2 SERPL-SCNC: 26 MMOL/L (ref 20–33)
CREAT SERPL-MCNC: 0.91 MG/DL (ref 0.5–1.4)
EOSINOPHIL # BLD AUTO: 0.15 K/UL (ref 0–0.51)
EOSINOPHIL NFR BLD: 1.6 % (ref 0–6.9)
ERYTHROCYTE [DISTWIDTH] IN BLOOD BY AUTOMATED COUNT: 47.1 FL (ref 35.9–50)
GLOBULIN SER CALC-MCNC: 2 G/DL (ref 1.9–3.5)
GLUCOSE BLD-MCNC: 428 MG/DL (ref 65–99)
GLUCOSE SERPL-MCNC: 431 MG/DL (ref 65–99)
HCO3 BLDV-SCNC: 26 MMOL/L (ref 24–28)
HCT VFR BLD AUTO: 36.4 % (ref 42–52)
HGB BLD-MCNC: 12 G/DL (ref 14–18)
LYMPHOCYTES # BLD AUTO: 2.97 K/UL (ref 1–4.8)
LYMPHOCYTES NFR BLD: 32.3 % (ref 22–41)
MAGNESIUM SERPL-MCNC: 1.9 MG/DL (ref 1.5–2.5)
MANUAL DIFF BLD: NORMAL
MCH RBC QN AUTO: 31.3 PG (ref 27–33)
MCHC RBC AUTO-ENTMCNC: 33 G/DL (ref 33.7–35.3)
MCV RBC AUTO: 95 FL (ref 81.4–97.8)
METAMYELOCYTES NFR BLD MANUAL: 0.8 %
MONOCYTES # BLD AUTO: 0.52 K/UL (ref 0–0.85)
MONOCYTES NFR BLD AUTO: 5.6 % (ref 0–13.4)
MORPHOLOGY BLD-IMP: NORMAL
NEUTROPHILS # BLD AUTO: 5.42 K/UL (ref 1.82–7.42)
NEUTROPHILS NFR BLD: 58.9 % (ref 44–72)
NRBC # BLD AUTO: 0.02 K/UL
NRBC BLD-RTO: 0.2 /100 WBC
PCO2 BLDV: 43.5 MMHG (ref 41–51)
PH BLDV: 7.4 [PH] (ref 7.31–7.45)
PHOSPHATE SERPL-MCNC: 4.2 MG/DL (ref 2.5–4.5)
PLATELET # BLD AUTO: 205 K/UL (ref 164–446)
PLATELET BLD QL SMEAR: NORMAL
PMV BLD AUTO: 9.8 FL (ref 9–12.9)
PO2 BLDV: 35.5 MMHG (ref 25–40)
POTASSIUM SERPL-SCNC: 4.4 MMOL/L (ref 3.6–5.5)
PROT SERPL-MCNC: 6.5 G/DL (ref 6–8.2)
RBC # BLD AUTO: 3.83 M/UL (ref 4.7–6.1)
RBC BLD AUTO: PRESENT
SAO2 % BLDV: 64.9 %
SODIUM SERPL-SCNC: 135 MMOL/L (ref 135–145)
WBC # BLD AUTO: 9.2 K/UL (ref 4.8–10.8)

## 2019-06-05 PROCEDURE — 82803 BLOOD GASES ANY COMBINATION: CPT

## 2019-06-05 PROCEDURE — 82962 GLUCOSE BLOOD TEST: CPT

## 2019-06-05 PROCEDURE — 82010 KETONE BODYS QUAN: CPT

## 2019-06-05 PROCEDURE — 99284 EMERGENCY DEPT VISIT MOD MDM: CPT

## 2019-06-05 PROCEDURE — 85027 COMPLETE CBC AUTOMATED: CPT

## 2019-06-05 PROCEDURE — 700105 HCHG RX REV CODE 258: Performed by: EMERGENCY MEDICINE

## 2019-06-05 PROCEDURE — 80053 COMPREHEN METABOLIC PANEL: CPT

## 2019-06-05 PROCEDURE — 85007 BL SMEAR W/DIFF WBC COUNT: CPT

## 2019-06-05 PROCEDURE — 84100 ASSAY OF PHOSPHORUS: CPT

## 2019-06-05 PROCEDURE — 83735 ASSAY OF MAGNESIUM: CPT

## 2019-06-05 RX ORDER — SODIUM CHLORIDE 9 MG/ML
1000 INJECTION, SOLUTION INTRAVENOUS ONCE
Status: COMPLETED | OUTPATIENT
Start: 2019-06-05 | End: 2019-06-05

## 2019-06-05 RX ADMIN — SODIUM CHLORIDE 1000 ML: 9 INJECTION, SOLUTION INTRAVENOUS at 20:15

## 2019-06-05 NOTE — ED TRIAGE NOTES
Pt bib ems. Pt with learning disability, frequent ED visits. Pt c/o his blood sugars being high today, 372. (type 2 DM) Pt was 417 in EMS. Pt took insulin as prescribed. Pt with no distress noted.

## 2019-06-05 NOTE — ED PROVIDER NOTES
ED Provider Note    ER PROVIDER NOTE        CHIEF COMPLAINT  Chief Complaint   Patient presents with   • High Blood Sugar       HPI  Norm Prabhakar is a 38 y.o. male who presents to the emergency department complaining of concern of his blood sugar.  Patient is on both insulin and metformin for his diabetes has been so over the last year.  He has had frequent visits to the emergency department high blood sugars, ports that today was getting readings over 300 and it did not seem to come down immediately after he took his insulin.  Patient has some learning disability and lives in group home and while he gets help with his medications.  Patient denies any other focal complaints, no abdominal pain nausea or vomiting.  No fevers or chills but no chest pain or difficulty breathing.  No dysuria, polyuria or polydipsia    REVIEW OF SYSTEMS  Pertinent positives include elevated blood sugar. Pertinent negatives include no vomiting. See HPI for details. All other systems reviewed and are negative.    PAST MEDICAL HISTORY   has a past medical history of Anxiety; ASTHMA; Bipolar 1 disorder ([facility]); Depression; Fall; Glaucoma; Glaucoma (1982); Hypothyroidism; Indigestion; Mental disorder; Murmur; Pneumonia; Psychiatric problem (2002); S/P thyroidectomy; Seizure (HCC) (2010); Seizure disorder ([facility]); and Unspecified disorder of thyroid.    SURGICAL HISTORY   has a past surgical history that includes eye surgery; thyroid lobectomy; and other.    FAMILY HISTORY  Family History   Problem Relation Age of Onset   • Hypertension Mother    • Heart Disease Mother    • Lung Disease Mother    • Stroke Maternal Grandmother        SOCIAL HISTORY  Social History     Social History   • Marital status: Single     Spouse name: N/A   • Number of children: N/A   • Years of education: N/A     Social History Main Topics   • Smoking status: Former Smoker     Packs/day: 0.25     Types: Cigarettes, Cigars     Quit date: 5/1/2018   • Smokeless tobacco:  "Never Used   • Alcohol use No   • Drug use: Yes     Types: Inhaled      Comment: marijuana   • Sexual activity: Not on file     Other Topics Concern   • Not on file     Social History Narrative   • No narrative on file      History   Drug Use   • Types: Inhaled     Comment: marijuana       CURRENT MEDICATIONS  Home Medications    **Home medications have not yet been reviewed for this encounter**         ALLERGIES  Allergies   Allergen Reactions   • Abilify Unspecified     \"Feeling tired, like I don't even know whats going on around me\"   • Fish      Pt reports fish causes him to be sick to his stomach         PHYSICAL EXAM  VITAL SIGNS: /75   Pulse 82   Temp 36.8 °C (98.2 °F) (Temporal)   Resp 14   Wt (!) 149.9 kg (330 lb 7.5 oz)   SpO2 95%   BMI 38.19 kg/m²   Pulse ox interpretation: I interpret this pulse ox as normal.    Constitutional: Alert in no apparent distress.  HENT: No signs of trauma, Bilateral external ears normal, Nose normal.   Eyes: Pupils are equal and reactive, Conjunctiva normal, Non-icteric.   Neck: Normal range of motion, No tenderness, Supple, No stridor.   Lymphatic: No lymphadenopathy noted.   Cardiovascular: Regular rate and rhythm, no murmurs.   Thorax & Lungs: Normal breath sounds, No respiratory distress, No wheezing, No chest tenderness.   Abdomen: Bowel sounds normal, Soft, No tenderness, No masses, No pulsatile masses. No peritoneal signs.  Skin: Warm, Dry, No erythema, No rash.   Back: No bony tenderness, No CVA tenderness.   Extremities: Intact distal pulses, No edema, No tenderness, No cyanosis, Negative Florencio's sign.  Musculoskeletal: Good range of motion in all major joints. No tenderness to palpation or major deformities noted.   Neurologic: Alert , Normal motor function, Normal sensory function, No focal deficits noted.   Psychiatric: Affect normal, Judgment normal, Mood normal.     DIAGNOSTIC STUDIES / PROCEDURES      LABS  Labs Reviewed   COMP METABOLIC PANEL - " Abnormal; Notable for the following:        Result Value    Glucose 330 (*)     All other components within normal limits   ACCU-CHEK GLUCOSE - Abnormal; Notable for the following:     Glucose - Accu-Ck 354 (*)     All other components within normal limits   ESTIMATED GFR       All labs reviewed by me.    RADIOLOGY  No orders to display     The radiologist's interpretation of all radiological studies have been reviewed by me.    COURSE & MEDICAL DECISION MAKING  Nursing notes, VS, PMSFHx reviewed in chart.    8:22 PM Patient seen and examined at bedside. Patient will be treated with IV fluid. Ordered for CMP to evaluate his symptoms.     HYDRATION: Based on the patient's presentation of Hyperglycemia the patient was given IV fluids. IV Hydration was used because oral hydration was not as rapid as required. Upon recheck following hydration, the patient was improved.     9:24 PM  Patient reevaluated, still well-appearing, updated on results, plan for discharge when his fluids are finished    Decision Making:  This is a 38 y.o. male presenting with high blood sugar.  Patient is overall well-appearing and asymptomatic, as well as blood sugar is elevated with no anion gap or other findings suggestive of DKA.  He has no symptoms to suggest infectious etiology or other metabolic disturbance at this time.  Patient does have some baseline delay which may contribute to his frequent visits as well.  I discussed strict return precautions as well as home care and follow-up with his primary care which he understands well, he lives in a group facility to help with administration of his medication    The patient will return for new or worsening symptoms and is stable at the time of discharge.    The patient is referred to a primary physician for blood pressure management, diabetic screening, and for all other preventative health concerns.      DISPOSITION:  Patient will be discharged home in stable condition.    FOLLOW UP:  Lori GUILLEN  ROXANNE MonroeN.  [address]  [facility] [address]  [phone]    In 3 days      Lori Monroe A.P.R.N.  [address]  [facility] [address]  [phone]            OUTPATIENT MEDICATIONS:  Discharge Medication List as of 6/4/2019  9:40 PM            FINAL IMPRESSION  1. Hyperglycemia         The note accurately reflects work and decisions made by me.  Sal Pereyra  6/5/2019  1:44 AM

## 2019-06-06 NOTE — DISCHARGE PLANNING
Medical Social Work     GABY received a call from the RN requesting SW assistance with transportation home. GABY met with the pt at bedside and he has his key to get into his place. GABY provided the pt with a taxi voucher to get home.     Plan: GABY will remain available for pt support.

## 2019-06-06 NOTE — ED NOTES
Pt resting comfortably in bed. Monitors in place VSS. No s/s of distress noted. Call bell within reach. Pt denies any needs at this time. Will continue to monitor.

## 2019-06-06 NOTE — ED NOTES
Pt while laying on his back and resting began to desaturate to 88% on RA. Pt placed on 2LPM via NC.

## 2019-06-06 NOTE — ED NOTES
Pt Given discharge instructions/home care instructions, Pt verbalized understanding of instructions given, pt ambulatory to [facility].  Pt given cab voucher back to his group home by Social Work

## 2019-06-06 NOTE — ED PROVIDER NOTES
ED Provider Note    Scribed for Herb Ortiz M.D. by Radha Douglass. 6/5/2019  7:48 PM    Primary care provider: ANIKET Chung  Means of arrival: Walk-in  History obtained from: Patient  History limited by: None    CHIEF COMPLAINT  Chief Complaint   Patient presents with   • High Blood Sugar     fsbs 428       HPI  Norm Prabhakar is a 38 y.o. Male with a history of Type II diabetes mellitus who presents to the Emergency Department for evaluation of hyperglycemia onset yesterday. Patient endorses associated generalized malaise, nausea, diarrhea, and two episodes of emesis today. He denies any fevers or abdominal pain. The patient was seen here in the ED for similar symptoms and was instructed to return if blood glucose remains elevated. At home, the patient reports blood glucose in the 400s that has not decreased. He has taken insulin without alleviation.     The patient lives in a group home, and has staff manage his medications, which includes metformin and Lantus. Patient notes he does his own injections.     REVIEW OF SYSTEMS  Pertinent negatives include no fevers or abdominal pain. As above, all other systems reviewed and are negative.   See HPI for further details.     PAST MEDICAL HISTORY   has a past medical history of Anxiety; ASTHMA; Bipolar 1 disorder (HCC); Depression; Fall; Glaucoma; Glaucoma (1982); Hypothyroidism; Indigestion; Mental disorder; Murmur; Pneumonia; Psychiatric problem (2002); S/P thyroidectomy; Seizure (HCC) (2010); Seizure disorder ([facility]); and Unspecified disorder of thyroid.    SURGICAL HISTORY   has a past surgical history that includes eye surgery; thyroid lobectomy; and other.    SOCIAL HISTORY  Social History   Substance Use Topics   • Smoking status: Former Smoker     Packs/day: 0.25     Types: Cigarettes, Cigars     Quit date: 5/1/2018   • Smokeless tobacco: Never Used   • Alcohol use No      History   Drug Use   • Types: Inhaled     Comment: marijuana        FAMILY HISTORY  Family History   Problem Relation Age of Onset   • Hypertension Mother    • Heart Disease Mother    • Lung Disease Mother    • Stroke Maternal Grandmother        CURRENT MEDICATIONS  No current facility-administered medications on file prior to encounter.      Current Outpatient Prescriptions on File Prior to Encounter   Medication Sig Dispense Refill   • ASPIRIN ADULT LOW STRENGTH 81 MG EC tablet Take 81 mg by mouth.  5   • cyclobenzaprine (FLEXERIL) 10 MG Tab TAKE 1 TABLET BY MOUTH TWICE A DAY AS NEEDED FOR BACK PAIN, MUSCLE SPASM  0   • hydrOXYzine HCl (ATARAX) 25 MG Tab TAKE 1 TABLET BY MOUTH 3 TIMES DAILY AS NEEDED FOR ANXIETY, SLEEP  0   • levothyroxine (SYNTHROID) 150 MCG Tab Take 150 mcg by mouth.  2   • LATUDA 20 MG Tab TAKE 1 TABLET BY MOUTH EACH EVENING WITH MEAL  0   • naproxen (NAPROSYN) 500 MG Tab TAKE 1 TABLET BY MOUTH TWICE A DAY AS NEEDED FOR PAIN  TAKE WITH FOOD  0   • prazosin (MINIPRESS) 2 MG Cap TAKE 1 CAPSULE BY MOUTH NIGHTLY AT BEDTIME FOR NIGHTMARES  0   • raNITidine (ZANTAC) 150 MG Tab TAKE 1 TABLET BY MOUTH TWICE A DAY TAKE AM AND PM ON EMPTY STOMACH  0   • Insulin Glargine (BASAGLAR KWIKPEN) 100 UNIT/ML Solution Pen-injector Inject  as instructed every evening.     • acetaminophen/caffeine/butalbital 325-40-50 mg (FIORICET) -40 MG Tab Take 1 Tab by mouth every four hours as needed for Headache.     • ondansetron (ZOFRAN) 4 MG Tab tablet Take 4 mg by mouth every four hours as needed for Nausea/Vomiting.     • atorvastatin (LIPITOR) 80 MG tablet Take 1 Tab by mouth every day. 90 Tab 1   • busPIRone (BUSPAR) 15 MG tablet Take 1 Tab by mouth 3 times a day. 90 Tab 2   • divalproex (DEPAKOTE) 500 MG Tablet Delayed Response Take 1-3 Tabs by mouth 2 Times a Day. 500 mg AM 1500 mg  Tab 2   • montelukast (SINGULAIR) 10 MG Tab Take 1 Tab by mouth every day. 30 Tab 2   • sertraline (ZOLOFT) 100 MG Tab Take 1 Tab by mouth every morning. 30 Tab 2   • glimepiride  "(AMARYL) 4 MG Tab Take 4 mg by mouth every morning.     • Cholecalciferol ([employer] D3) 2000 UNIT Cap Take 4,000 Units by mouth every evening.     • metFORMIN (GLUCOPHAGE) 1000 MG tablet Take 1 Tab by mouth 2 times a day, with meals. 60 Tab 2       ALLERGIES  Allergies   Allergen Reactions   • Abilify Unspecified     \"Feeling tired, like I don't even know whats going on around me\"   • Fish      Pt reports fish causes him to be sick to his stomach         PHYSICAL EXAM  VITAL SIGNS: /81   Pulse 83   Temp 36.6 °C (97.9 °F) (Temporal)   Resp 12   Ht 1.981 m (6' 6\")   Wt (!) 148.2 kg (326 lb 11.6 oz)   SpO2 95%   BMI 37.76 kg/m²   Constitutional: Well developed, Well nourished, No acute distress, Non-toxic appearance.   HENT: Normocephalic, Atraumatic, Bilateral external ears normal, Oropharynx is clear. Dry mucous membranes. No oral exudates or nasal discharge. posterior oropharynx no erythema/enlargement/exudates -.  Patient does have malocclusion.  Breath is nonketotic  Eyes: Pupils are equal round and reactive, EOMI, Conjunctiva normal, No discharge.   Neck: Normal range of motion, No tenderness, Supple, No stridor. No meningismus.  Lymphatic: No lymphadenopathy noted.   Cardiovascular: Regular rate and rhythm without murmur rub or gallop.  Thorax & Lungs: Clear breath sounds bilaterally without wheezes, rhonchi or rales. There is no chest wall tenderness.   Abdomen: Soft non-tender non-distended. There is no rebound or guarding. No organomegaly is appreciated. Bowel sounds are normal.  Skin: Normal without rash.   Back: No CVA or spinal tenderness.   Extremities: Intact distal pulses, No edema, No tenderness, No cyanosis, No clubbing. Capillary refill is less than 2 seconds.  Musculoskeletal: Good range of motion in all major joints. No tenderness to palpation or major deformities noted.   Neurologic: Alert & oriented x 3, Normal motor function, Normal sensory function, No focal deficits noted. Reflexes are " normal.  Psychiatric: Affect normal, Judgment normal, Mood normal. There is no suicidal ideation or patient reported hallucinations.       DIAGNOSTIC STUDIES / PROCEDURES    LABS  Labs Reviewed   CBC WITH DIFFERENTIAL - Abnormal; Notable for the following:        Result Value    RBC 3.83 (*)     Hemoglobin 12.0 (*)     Hematocrit 36.4 (*)     MCHC 33.0 (*)     All other components within normal limits   COMP METABOLIC PANEL - Abnormal; Notable for the following:     Glucose 431 (*)     All other components within normal limits   ACCU-CHEK GLUCOSE - Abnormal; Notable for the following:     Glucose - Accu-Ck 428 (*)     All other components within normal limits   BETA-HYDROXYBUTYRIC ACID   PHOSPHORUS   MAGNESIUM   VENOUS BLOOD GAS   ESTIMATED GFR   DIFFERENTIAL MANUAL   PERIPHERAL SMEAR REVIEW   PLATELET ESTIMATE   MORPHOLOGY      All labs reviewed by me.      COURSE & MEDICAL DECISION MAKING  Nursing notes, VS, PMSFHx reviewed in chart.    7:48 PM Patient seen and examined at bedside. Ordered for labs to evaluate. Patient was treated with NS infusion 1 L for his symptoms.    HYDRATION: Based on the patient's presentation of Hyperglycemia the patient was given IV fluids. IV Hydration was used because oral hydration was not adequate alone. Upon recheck following hydration, the patient was Improved.  HTN/IDDM FOLLOW UP:  The patient is referred to a primary physician for blood pressure management, diabetic screening, and for all other preventive health concerns    8:40 PM - Reviewed lab results which reveal reassuring venous gas, and bicarbonate level of 26.  There is no leukocytosis, shift, anemia or acidosis to suggest diabetic ketoacidosis.  No ketones in the serum.    Patient has had high blood pressure while in the emergency department, felt likely secondary to medical condition. Counseled patient to monitor blood pressure at home and follow up with primary care physician.       The patient will return for new or  worsening symptoms and is stable at the time of discharge.  I think he needs better medication coordination as an outpatient with his primary care doctor.  Referred back to his primary doctor for short acting insulin in addition to Lantus and metformin.  Patient understands his plan of care and is to strict control his diet avoiding simple sugars    DISPOSITION:  Patient will be discharged home in stable condition.    FINAL IMPRESSION  1. Hyperglycemia    2. Non-intractable vomiting with nausea, unspecified vomiting type          I, Radha Douglass (Stephanie), am scribing for, and in the presence of, Herb Ortiz M.D..    Electronically signed by: Radha Douglass (Robertibcarmita), 6/5/2019    I, Herb Ortiz M.D. personally performed the services described in this documentation, as scribed by Radha Douglass in my presence, and it is both accurate and complete. C.     The note accurately reflects work and decisions made by me.  Herb Ortiz  6/5/2019  9:55 PM

## 2019-06-06 NOTE — ED TRIAGE NOTES
"PT ambulated to triage c/o high blood sugar, pt reports his sugar was 458 at home, pt took his 20 units of his insulin, pt reports it is not working,  FSBS in triage 428  Chief Complaint   Patient presents with   • High Blood Sugar     fsbs 428     /81   Pulse 83   Temp 36.6 °C (97.9 °F) (Temporal)   Resp 12   Ht 1.981 m (6' 6\")   Wt (!) 148.2 kg (326 lb 11.6 oz)   SpO2 95%     "

## 2019-07-09 ENCOUNTER — HOSPITAL ENCOUNTER (EMERGENCY)
Facility: MEDICAL CENTER | Age: 39
End: 2019-07-09
Attending: EMERGENCY MEDICINE
Payer: MEDICAID

## 2019-07-09 VITALS
OXYGEN SATURATION: 94 % | TEMPERATURE: 97.3 F | DIASTOLIC BLOOD PRESSURE: 86 MMHG | HEIGHT: 78 IN | BODY MASS INDEX: 36.45 KG/M2 | SYSTOLIC BLOOD PRESSURE: 134 MMHG | WEIGHT: 315 LBS | RESPIRATION RATE: 17 BRPM | HEART RATE: 91 BPM

## 2019-07-09 DIAGNOSIS — R73.9 HYPERGLYCEMIA: ICD-10-CM

## 2019-07-09 DIAGNOSIS — R56.9 SEIZURE (HCC): ICD-10-CM

## 2019-07-09 LAB
ALBUMIN SERPL BCP-MCNC: 4.1 G/DL (ref 3.2–4.9)
ALBUMIN/GLOB SERPL: 1.6 G/DL
ALP SERPL-CCNC: 52 U/L (ref 30–99)
ALT SERPL-CCNC: 13 U/L (ref 2–50)
ANION GAP SERPL CALC-SCNC: 10 MMOL/L (ref 0–11.9)
AST SERPL-CCNC: 14 U/L (ref 12–45)
BASOPHILS # BLD AUTO: 0.6 % (ref 0–1.8)
BASOPHILS # BLD: 0.06 K/UL (ref 0–0.12)
BILIRUB SERPL-MCNC: 0.4 MG/DL (ref 0.1–1.5)
BUN SERPL-MCNC: 20 MG/DL (ref 8–22)
CALCIUM SERPL-MCNC: 9.6 MG/DL (ref 8.5–10.5)
CHLORIDE SERPL-SCNC: 100 MMOL/L (ref 96–112)
CO2 SERPL-SCNC: 23 MMOL/L (ref 20–33)
CREAT SERPL-MCNC: 1.1 MG/DL (ref 0.5–1.4)
EOSINOPHIL # BLD AUTO: 0.13 K/UL (ref 0–0.51)
EOSINOPHIL NFR BLD: 1.3 % (ref 0–6.9)
ERYTHROCYTE [DISTWIDTH] IN BLOOD BY AUTOMATED COUNT: 43.5 FL (ref 35.9–50)
GLOBULIN SER CALC-MCNC: 2.5 G/DL (ref 1.9–3.5)
GLUCOSE BLD-MCNC: 213 MG/DL (ref 65–99)
GLUCOSE SERPL-MCNC: 338 MG/DL (ref 65–99)
HCT VFR BLD AUTO: 35.8 % (ref 42–52)
HGB BLD-MCNC: 11.9 G/DL (ref 14–18)
IMM GRANULOCYTES # BLD AUTO: 0.13 K/UL (ref 0–0.11)
IMM GRANULOCYTES NFR BLD AUTO: 1.3 % (ref 0–0.9)
LYMPHOCYTES # BLD AUTO: 2.23 K/UL (ref 1–4.8)
LYMPHOCYTES NFR BLD: 22.7 % (ref 22–41)
MCH RBC QN AUTO: 31.1 PG (ref 27–33)
MCHC RBC AUTO-ENTMCNC: 33.2 G/DL (ref 33.7–35.3)
MCV RBC AUTO: 93.5 FL (ref 81.4–97.8)
MONOCYTES # BLD AUTO: 0.88 K/UL (ref 0–0.85)
MONOCYTES NFR BLD AUTO: 9 % (ref 0–13.4)
NEUTROPHILS # BLD AUTO: 6.39 K/UL (ref 1.82–7.42)
NEUTROPHILS NFR BLD: 65.1 % (ref 44–72)
NRBC # BLD AUTO: 0 K/UL
NRBC BLD-RTO: 0 /100 WBC
PLATELET # BLD AUTO: 271 K/UL (ref 164–446)
PMV BLD AUTO: 10 FL (ref 9–12.9)
POTASSIUM SERPL-SCNC: 4.1 MMOL/L (ref 3.6–5.5)
PROT SERPL-MCNC: 6.6 G/DL (ref 6–8.2)
RBC # BLD AUTO: 3.83 M/UL (ref 4.7–6.1)
SODIUM SERPL-SCNC: 133 MMOL/L (ref 135–145)
VALPROATE SERPL-MCNC: 32.8 UG/ML (ref 50–100)
WBC # BLD AUTO: 9.8 K/UL (ref 4.8–10.8)

## 2019-07-09 PROCEDURE — 82962 GLUCOSE BLOOD TEST: CPT

## 2019-07-09 PROCEDURE — 80164 ASSAY DIPROPYLACETIC ACD TOT: CPT

## 2019-07-09 PROCEDURE — 85025 COMPLETE CBC W/AUTO DIFF WBC: CPT

## 2019-07-09 PROCEDURE — 700102 HCHG RX REV CODE 250 W/ 637 OVERRIDE(OP): Performed by: EMERGENCY MEDICINE

## 2019-07-09 PROCEDURE — 94760 N-INVAS EAR/PLS OXIMETRY 1: CPT

## 2019-07-09 PROCEDURE — 80053 COMPREHEN METABOLIC PANEL: CPT

## 2019-07-09 PROCEDURE — 99285 EMERGENCY DEPT VISIT HI MDM: CPT

## 2019-07-09 PROCEDURE — A9270 NON-COVERED ITEM OR SERVICE: HCPCS | Performed by: EMERGENCY MEDICINE

## 2019-07-09 PROCEDURE — 96374 THER/PROPH/DIAG INJ IV PUSH: CPT

## 2019-07-09 RX ORDER — DIVALPROEX SODIUM 500 MG/1
1500 TABLET, DELAYED RELEASE ORAL ONCE
Status: COMPLETED | OUTPATIENT
Start: 2019-07-09 | End: 2019-07-09

## 2019-07-09 RX ADMIN — INSULIN HUMAN 5 UNITS: 100 INJECTION, SOLUTION PARENTERAL at 19:29

## 2019-07-09 RX ADMIN — DIVALPROEX SODIUM 1500 MG: 500 TABLET, DELAYED RELEASE ORAL at 19:53

## 2019-07-09 ASSESSMENT — ENCOUNTER SYMPTOMS
SHORTNESS OF BREATH: 0
LOSS OF CONSCIOUSNESS: 1
SEIZURES: 1
TREMORS: 1

## 2019-07-09 ASSESSMENT — LIFESTYLE VARIABLES: DO YOU DRINK ALCOHOL: NO

## 2019-07-09 NOTE — ED TRIAGE NOTES
Amb to triage w/ c/o seizures x 2 today.  Hx of epilepsy, takes Depakote.  Last seizure prior to this was 3 wks ago.  Pt reports minor oral trauma today.

## 2019-07-10 NOTE — ED PROVIDER NOTES
"ED Provider Note    Scribed for ALDO Grande II* by Krzysztof Stokes. 7/9/2019  5:17 PM    Means of Arrival: Walk in  History obtained by: Patient  Limitations: None    CHIEF COMPLAINT  Chief Complaint   Patient presents with   • Seizure     x 2 today       HPI  Norm Prabhakar is a 39 y.o. male who presents to the Emergency Department for evaluation after multiple seizures onset today. His first seizure occurred this morning while playing basketball. He then had a second seizure. He reports that his friends saw him \"pass out.\" He endorses associated shaking, urine incontinence, and general \"not feeling well.\" Per nurse's note, he has a history of epilepsy and manages this with Depakote which he receives at his group home. He is normally weaker on his right side compared to his left side. His sugars have been reported to be \"okay.\" Denies chest pain or shortness of breath.    REVIEW OF SYSTEMS  Review of Systems   Constitutional: Positive for malaise/fatigue.   Respiratory: Negative for shortness of breath.    Cardiovascular: Negative for chest pain.   Genitourinary:        Urinary incontinence   Neurological: Positive for tremors, seizures and loss of consciousness.   All other systems reviewed and are negative.    See HPI for further details.    PAST MEDICAL HISTORY   has a past medical history of Anxiety; ASTHMA; Bipolar 1 disorder ([facility]); Depression; Fall; Glaucoma; Glaucoma (1982); Hypothyroidism; Indigestion; Mental disorder; Murmur; Pneumonia; Psychiatric problem (2002); S/P thyroidectomy; Seizure (HCC) (2010); Seizure disorder ([facility]); and Unspecified disorder of thyroid.    SOCIAL HISTORY  Social History     Social History Main Topics   • Smoking status: Former Smoker     Packs/day: 0.25     Types: Cigarettes, Cigars     Quit date: 5/1/2018   • Smokeless tobacco: Never Used   • Alcohol use No   • Drug use: Yes     Types: Inhaled      Comment: marijuana       SURGICAL HISTORY   has a past surgical " history that includes eye surgery; thyroid lobectomy; and other.    CURRENT MEDICATIONS  No current facility-administered medications on file prior to encounter.      Current Outpatient Prescriptions on File Prior to Encounter   Medication Sig Dispense Refill   • ASPIRIN ADULT LOW STRENGTH 81 MG EC tablet Take 81 mg by mouth.  5   • cyclobenzaprine (FLEXERIL) 10 MG Tab TAKE 1 TABLET BY MOUTH TWICE A DAY AS NEEDED FOR BACK PAIN, MUSCLE SPASM  0   • hydrOXYzine HCl (ATARAX) 25 MG Tab TAKE 1 TABLET BY MOUTH 3 TIMES DAILY AS NEEDED FOR ANXIETY, SLEEP  0   • levothyroxine (SYNTHROID) 150 MCG Tab Take 150 mcg by mouth.  2   • LATUDA 20 MG Tab TAKE 1 TABLET BY MOUTH EACH EVENING WITH MEAL  0   • naproxen (NAPROSYN) 500 MG Tab TAKE 1 TABLET BY MOUTH TWICE A DAY AS NEEDED FOR PAIN  TAKE WITH FOOD  0   • prazosin (MINIPRESS) 2 MG Cap TAKE 1 CAPSULE BY MOUTH NIGHTLY AT BEDTIME FOR NIGHTMARES  0   • raNITidine (ZANTAC) 150 MG Tab TAKE 1 TABLET BY MOUTH TWICE A DAY TAKE AM AND PM ON EMPTY STOMACH  0   • Insulin Glargine (BASAGLAR KWIKPEN) 100 UNIT/ML Solution Pen-injector Inject  as instructed every evening.     • acetaminophen/caffeine/butalbital 325-40-50 mg (FIORICET) -40 MG Tab Take 1 Tab by mouth every four hours as needed for Headache.     • ondansetron (ZOFRAN) 4 MG Tab tablet Take 4 mg by mouth every four hours as needed for Nausea/Vomiting.     • atorvastatin (LIPITOR) 80 MG tablet Take 1 Tab by mouth every day. 90 Tab 1   • busPIRone (BUSPAR) 15 MG tablet Take 1 Tab by mouth 3 times a day. 90 Tab 2   • divalproex (DEPAKOTE) 500 MG Tablet Delayed Response Take 1-3 Tabs by mouth 2 Times a Day. 500 mg AM 1500 mg  Tab 2   • montelukast (SINGULAIR) 10 MG Tab Take 1 Tab by mouth every day. 30 Tab 2   • sertraline (ZOLOFT) 100 MG Tab Take 1 Tab by mouth every morning. 30 Tab 2   • glimepiride (AMARYL) 4 MG Tab Take 4 mg by mouth every morning.     • Cholecalciferol ([employer] D3) 2000 UNIT Cap Take 4,000 Units by mouth  "every evening.     • metFORMIN (GLUCOPHAGE) 1000 MG tablet Take 1 Tab by mouth 2 times a day, with meals. 60 Tab 2      ALLERGIES  Allergies   Allergen Reactions   • Abilify Unspecified     \"Feeling tired, like I don't even know whats going on around me\"   • Fish      Pt reports fish causes him to be sick to his stomach         PHYSICAL EXAM  VITAL SIGNS: /87   Pulse 100   Temp 36.1 °C (97 °F) (Temporal)   Resp 18   Ht 1.981 m (6' 6\")   Wt (!) 146.3 kg (322 lb 8.5 oz)   SpO2 94%   BMI 37.27 kg/m²     Pulse ox interpretation: I interpret this pulse ox as normal.  Constitutional: Alert in no apparent distress. Obese 39 year old man, semi supine on bed  HENT: No signs of trauma, no bite marks or trauma, Bilateral external ears normal, Nose normal.   Eyes: Pupils are equal, Conjunctiva normal, Non-icteric.   Neck: Normal range of motion, No tenderness, Supple, No stridor.   Cardiovascular: Rate borderline elevated at 100 and normal rhythm, no murmurs. Symmetric distal pulses. No cyanosis of extremities. No peripheral edema of extremities.  Thorax & Lungs: Normal breath sounds, No respiratory distress, No wheezing, No chest tenderness.   Abdomen: Bowel sounds normal, Soft, No tenderness, No masses, No pulsatile masses. No peritoneal signs.  Skin: Warm, Dry, No erythema, No rash.   Back: No midline bony tenderness, No CVA tenderness.   Musculoskeletal: Good range of motion in all major joints. No tenderness to palpation or major deformities noted.   Neurologic: Alert and oriented to person, time, place and situation, moving all extremities with 4/5 strength, subtle weakness at right compared to left (this is previously known and documented), no slurred speech, no tremor.  Normal sensory function  Psychiatric: Affect normal, Judgment normal, Mood normal.       DIAGNOSTIC STUDIES / PROCEDURES    LABS  Pertinent Labs & Imaging studies reviewed. (See chart for details)    COURSE & MEDICAL DECISION " MAKING  Pertinent Labs & Imaging studies reviewed. (See chart for details)    5:17 PM This is a 39 y.o. male who presents with concerns of a seizure and the differential diagnosis includes but is not limited to: seizure, valproic acid toxicity, hyperglycemia, hypoglycemia, electrolyte abnormality, dehydration. Ordered for CBC, CMP, and valproic acid to evaluate.  He frequently comes to emergency department often with various complaints.  Oftentimes has elevated glucose.  We will try to determine reason for seizures today with above-stated labs.    6:47 PM - Ordered insulin regular 5 units for hyperglycemia.  I have also asked him to start orally hydrating.    7:13 PM - Valproic acid level is low.  Given reported history of seizure today I will give a loading dose of valproic acid.  Calculated to be 1500 mg.  He will not need to take his evening dose.    8:24 PM Repeat glucose was 214. He will be discharged.      The patient will return for worsening symptoms and is stable at the time of discharge. The patient verbalizes understanding and will comply. Guidance provided on appropriate use of medications.    The patient will return for new or worsening symptoms and is stable at the time of discharge.    The patient is referred to a primary physician for blood pressure management, diabetic screening, and for all other preventative health concerns.    DISPOSITION:  Patient will be discharged home in stable condition.    FOLLOW UP:  Lori Monroe, SATHYAPLyricR.N.  [address] [place] [id number] [phone]    Schedule an appointment as soon as possible for a visit   ideally within 1 week to be re-checked      FINAL IMPRESSION  1. Seizure (HCC)    2. Hyperglycemia          Krzysztof ROB (Stephanie), am scribing for, and in the presence of, JULITO Grande II.    Electronically signed by: Krzysztof Hawley), 7/9/2019    Adrian ROB II, M* personally performed the services described in this  documentation, as scribed by Krzysztof Stokes in my presence, and it is both accurate and complete. C.     The note accurately reflects work and decisions made by me.  Adrian Huizar II  7/10/2019  12:23 AM

## 2019-07-10 NOTE — ED NOTES
PT is discharged. PT given paperwork. IV removed and bleeding controled. All questions answered. PT walked to waiting room.

## 2019-07-10 NOTE — ED NOTES
START OF SHIFT- Report received from Joanne ANNE. White board updated. Pt updated on POC. Pending: meds and reassessment. PT in no distress at this time. Will continue to monitor.

## 2019-07-21 ENCOUNTER — APPOINTMENT (OUTPATIENT)
Dept: RADIOLOGY | Facility: MEDICAL CENTER | Age: 39
End: 2019-07-21
Attending: HOSPITALIST
Payer: MEDICAID

## 2019-07-21 ENCOUNTER — HOSPITAL ENCOUNTER (OUTPATIENT)
Facility: MEDICAL CENTER | Age: 39
End: 2019-07-28
Attending: EMERGENCY MEDICINE | Admitting: HOSPITALIST
Payer: MEDICAID

## 2019-07-21 ENCOUNTER — APPOINTMENT (OUTPATIENT)
Dept: RADIOLOGY | Facility: MEDICAL CENTER | Age: 39
End: 2019-07-21
Attending: EMERGENCY MEDICINE
Payer: MEDICAID

## 2019-07-21 DIAGNOSIS — F43.10 PTSD (POST-TRAUMATIC STRESS DISORDER): ICD-10-CM

## 2019-07-21 DIAGNOSIS — R53.1 ACUTE RIGHT-SIDED WEAKNESS: ICD-10-CM

## 2019-07-21 DIAGNOSIS — R53.1 WEAKNESS: ICD-10-CM

## 2019-07-21 LAB
ABO GROUP BLD: NORMAL
ALBUMIN SERPL BCP-MCNC: 4.5 G/DL (ref 3.2–4.9)
ALBUMIN/GLOB SERPL: 1.9 G/DL
ALP SERPL-CCNC: 57 U/L (ref 30–99)
ALT SERPL-CCNC: 13 U/L (ref 2–50)
ANION GAP SERPL CALC-SCNC: 11 MMOL/L (ref 0–11.9)
APTT PPP: 28.4 SEC (ref 24.7–36)
AST SERPL-CCNC: 13 U/L (ref 12–45)
BASOPHILS # BLD AUTO: 0 % (ref 0–1.8)
BASOPHILS # BLD: 0 K/UL (ref 0–0.12)
BILIRUB SERPL-MCNC: 0.4 MG/DL (ref 0.1–1.5)
BLD GP AB SCN SERPL QL: NORMAL
BUN SERPL-MCNC: 22 MG/DL (ref 8–22)
CALCIUM SERPL-MCNC: 9.4 MG/DL (ref 8.5–10.5)
CHLORIDE SERPL-SCNC: 99 MMOL/L (ref 96–112)
CO2 SERPL-SCNC: 24 MMOL/L (ref 20–33)
CREAT SERPL-MCNC: 0.9 MG/DL (ref 0.5–1.4)
EKG IMPRESSION: NORMAL
EOSINOPHIL # BLD AUTO: 0.1 K/UL (ref 0–0.51)
EOSINOPHIL NFR BLD: 0.9 % (ref 0–6.9)
ERYTHROCYTE [DISTWIDTH] IN BLOOD BY AUTOMATED COUNT: 42.5 FL (ref 35.9–50)
GLOBULIN SER CALC-MCNC: 2.4 G/DL (ref 1.9–3.5)
GLUCOSE SERPL-MCNC: 251 MG/DL (ref 65–99)
HCT VFR BLD AUTO: 38.4 % (ref 42–52)
HGB BLD-MCNC: 12.8 G/DL (ref 14–18)
INR PPP: 0.93 (ref 0.87–1.13)
LACTATE BLD-SCNC: 2.2 MMOL/L (ref 0.5–2)
LYMPHOCYTES # BLD AUTO: 2.85 K/UL (ref 1–4.8)
LYMPHOCYTES NFR BLD: 25 % (ref 22–41)
MANUAL DIFF BLD: NORMAL
MCH RBC QN AUTO: 30.6 PG (ref 27–33)
MCHC RBC AUTO-ENTMCNC: 33.3 G/DL (ref 33.7–35.3)
MCV RBC AUTO: 91.9 FL (ref 81.4–97.8)
METAMYELOCYTES NFR BLD MANUAL: 0.9 %
MONOCYTES # BLD AUTO: 0.62 K/UL (ref 0–0.85)
MONOCYTES NFR BLD AUTO: 5.4 % (ref 0–13.4)
MORPHOLOGY BLD-IMP: NORMAL
MYELOCYTES NFR BLD MANUAL: 3.6 %
NEUTROPHILS # BLD AUTO: 7.33 K/UL (ref 1.82–7.42)
NEUTROPHILS NFR BLD: 62.5 % (ref 44–72)
NEUTS BAND NFR BLD MANUAL: 1.8 % (ref 0–10)
NRBC # BLD AUTO: 0.02 K/UL
NRBC BLD-RTO: 0.2 /100 WBC
PLATELET # BLD AUTO: 292 K/UL (ref 164–446)
PLATELET BLD QL SMEAR: NORMAL
PMV BLD AUTO: 9.5 FL (ref 9–12.9)
POTASSIUM SERPL-SCNC: 4.1 MMOL/L (ref 3.6–5.5)
PROLACTIN SERPL-MCNC: 18.59 NG/ML (ref 2.1–17.7)
PROT SERPL-MCNC: 6.9 G/DL (ref 6–8.2)
PROTHROMBIN TIME: 12.7 SEC (ref 12–14.6)
RBC # BLD AUTO: 4.18 M/UL (ref 4.7–6.1)
RBC BLD AUTO: PRESENT
RH BLD: NORMAL
SMUDGE CELLS BLD QL SMEAR: NORMAL
SODIUM SERPL-SCNC: 134 MMOL/L (ref 135–145)
TROPONIN T SERPL-MCNC: 14 NG/L (ref 6–19)
TSH SERPL DL<=0.005 MIU/L-ACNC: 25.97 UIU/ML (ref 0.38–5.33)
VALPROATE SERPL-MCNC: 38.4 UG/ML (ref 50–100)
WBC # BLD AUTO: 11.4 K/UL (ref 4.8–10.8)

## 2019-07-21 PROCEDURE — 83036 HEMOGLOBIN GLYCOSYLATED A1C: CPT

## 2019-07-21 PROCEDURE — 85610 PROTHROMBIN TIME: CPT

## 2019-07-21 PROCEDURE — G0378 HOSPITAL OBSERVATION PER HR: HCPCS

## 2019-07-21 PROCEDURE — 84484 ASSAY OF TROPONIN QUANT: CPT

## 2019-07-21 PROCEDURE — 83605 ASSAY OF LACTIC ACID: CPT

## 2019-07-21 PROCEDURE — 71045 X-RAY EXAM CHEST 1 VIEW: CPT

## 2019-07-21 PROCEDURE — 70498 CT ANGIOGRAPHY NECK: CPT

## 2019-07-21 PROCEDURE — 85027 COMPLETE CBC AUTOMATED: CPT

## 2019-07-21 PROCEDURE — 86901 BLOOD TYPING SEROLOGIC RH(D): CPT

## 2019-07-21 PROCEDURE — 700117 HCHG RX CONTRAST REV CODE 255: Performed by: EMERGENCY MEDICINE

## 2019-07-21 PROCEDURE — 85730 THROMBOPLASTIN TIME PARTIAL: CPT

## 2019-07-21 PROCEDURE — 36415 COLL VENOUS BLD VENIPUNCTURE: CPT

## 2019-07-21 PROCEDURE — 80164 ASSAY DIPROPYLACETIC ACD TOT: CPT

## 2019-07-21 PROCEDURE — 86850 RBC ANTIBODY SCREEN: CPT

## 2019-07-21 PROCEDURE — 0042T CT-CEREBRAL PERFUSION ANALYSIS: CPT

## 2019-07-21 PROCEDURE — 700102 HCHG RX REV CODE 250 W/ 637 OVERRIDE(OP): Performed by: HOSPITALIST

## 2019-07-21 PROCEDURE — 86900 BLOOD TYPING SEROLOGIC ABO: CPT

## 2019-07-21 PROCEDURE — 99220 PR INITIAL OBSERVATION CARE,LEVL III: CPT | Performed by: HOSPITALIST

## 2019-07-21 PROCEDURE — 70496 CT ANGIOGRAPHY HEAD: CPT

## 2019-07-21 PROCEDURE — 84443 ASSAY THYROID STIM HORMONE: CPT

## 2019-07-21 PROCEDURE — 85007 BL SMEAR W/DIFF WBC COUNT: CPT

## 2019-07-21 PROCEDURE — 70450 CT HEAD/BRAIN W/O DYE: CPT

## 2019-07-21 PROCEDURE — 99285 EMERGENCY DEPT VISIT HI MDM: CPT

## 2019-07-21 PROCEDURE — 93005 ELECTROCARDIOGRAM TRACING: CPT | Performed by: EMERGENCY MEDICINE

## 2019-07-21 PROCEDURE — 80053 COMPREHEN METABOLIC PANEL: CPT

## 2019-07-21 PROCEDURE — 84146 ASSAY OF PROLACTIN: CPT

## 2019-07-21 RX ORDER — PROMETHAZINE HYDROCHLORIDE 25 MG/1
12.5-25 TABLET ORAL EVERY 4 HOURS PRN
Status: DISCONTINUED | OUTPATIENT
Start: 2019-07-21 | End: 2019-07-28 | Stop reason: HOSPADM

## 2019-07-21 RX ORDER — CYCLOBENZAPRINE HCL 10 MG
10 TABLET ORAL EVERY EVENING
Status: DISCONTINUED | OUTPATIENT
Start: 2019-07-21 | End: 2019-07-21

## 2019-07-21 RX ORDER — ATORVASTATIN CALCIUM 20 MG/1
80 TABLET, FILM COATED ORAL EVERY EVENING
Status: DISCONTINUED | OUTPATIENT
Start: 2019-07-21 | End: 2019-07-21

## 2019-07-21 RX ORDER — SERTRALINE HYDROCHLORIDE 100 MG/1
100 TABLET, FILM COATED ORAL DAILY
Status: ON HOLD | COMMUNITY
End: 2019-07-28

## 2019-07-21 RX ORDER — ASPIRIN 81 MG/1
324 TABLET, CHEWABLE ORAL DAILY
Status: DISCONTINUED | OUTPATIENT
Start: 2019-07-22 | End: 2019-07-28 | Stop reason: HOSPADM

## 2019-07-21 RX ORDER — ATORVASTATIN CALCIUM 80 MG/1
80 TABLET, FILM COATED ORAL EVERY EVENING
COMMUNITY
End: 2021-02-03

## 2019-07-21 RX ORDER — LURASIDONE HYDROCHLORIDE 20 MG/1
20 TABLET, FILM COATED ORAL EVERY EVENING
COMMUNITY
End: 2019-12-21

## 2019-07-21 RX ORDER — MONTELUKAST SODIUM 10 MG/1
10 TABLET ORAL DAILY
Status: DISCONTINUED | OUTPATIENT
Start: 2019-07-22 | End: 2019-07-21

## 2019-07-21 RX ORDER — RANITIDINE 150 MG/1
150 TABLET ORAL 2 TIMES DAILY
COMMUNITY
End: 2020-06-20

## 2019-07-21 RX ORDER — DIVALPROEX SODIUM 500 MG/1
500 TABLET, DELAYED RELEASE ORAL 2 TIMES DAILY
Status: DISCONTINUED | OUTPATIENT
Start: 2019-07-21 | End: 2019-07-28 | Stop reason: HOSPADM

## 2019-07-21 RX ORDER — BUSPIRONE HYDROCHLORIDE 10 MG/1
10 TABLET ORAL 2 TIMES DAILY
Status: DISCONTINUED | OUTPATIENT
Start: 2019-07-21 | End: 2019-07-28 | Stop reason: HOSPADM

## 2019-07-21 RX ORDER — LIDOCAINE 50 MG/G
1 PATCH TOPICAL EVERY 12 HOURS
COMMUNITY
End: 2019-07-30 | Stop reason: ALTCHOICE

## 2019-07-21 RX ORDER — ASPIRIN 300 MG/1
300 SUPPOSITORY RECTAL DAILY
Status: DISCONTINUED | OUTPATIENT
Start: 2019-07-22 | End: 2019-07-28 | Stop reason: HOSPADM

## 2019-07-21 RX ORDER — LEVOTHYROXINE SODIUM 0.2 MG/1
200 TABLET ORAL
COMMUNITY
End: 2020-06-20

## 2019-07-21 RX ORDER — ALBUTEROL SULFATE 90 UG/1
2 AEROSOL, METERED RESPIRATORY (INHALATION) EVERY 4 HOURS PRN
Status: DISCONTINUED | OUTPATIENT
Start: 2019-07-21 | End: 2019-07-28 | Stop reason: HOSPADM

## 2019-07-21 RX ORDER — BISACODYL 10 MG
10 SUPPOSITORY, RECTAL RECTAL
Status: DISCONTINUED | OUTPATIENT
Start: 2019-07-21 | End: 2019-07-28 | Stop reason: HOSPADM

## 2019-07-21 RX ORDER — GLIMEPIRIDE 4 MG/1
4 TABLET ORAL EVERY MORNING
COMMUNITY
End: 2021-02-03

## 2019-07-21 RX ORDER — ASPIRIN 325 MG
325 TABLET ORAL DAILY
Status: DISCONTINUED | OUTPATIENT
Start: 2019-07-22 | End: 2019-07-28 | Stop reason: HOSPADM

## 2019-07-21 RX ORDER — DIVALPROEX SODIUM 500 MG/1
500-1500 TABLET, DELAYED RELEASE ORAL 2 TIMES DAILY
Status: ON HOLD | COMMUNITY
End: 2020-09-03 | Stop reason: SDUPTHER

## 2019-07-21 RX ORDER — ATORVASTATIN CALCIUM 80 MG/1
80 TABLET, FILM COATED ORAL
Status: DISCONTINUED | OUTPATIENT
Start: 2019-07-22 | End: 2019-07-28 | Stop reason: HOSPADM

## 2019-07-21 RX ORDER — PROMETHAZINE HYDROCHLORIDE 25 MG/1
12.5-25 SUPPOSITORY RECTAL EVERY 4 HOURS PRN
Status: DISCONTINUED | OUTPATIENT
Start: 2019-07-21 | End: 2019-07-28 | Stop reason: HOSPADM

## 2019-07-21 RX ORDER — RANITIDINE 150 MG/1
150 TABLET ORAL 2 TIMES DAILY
Status: DISCONTINUED | OUTPATIENT
Start: 2019-07-21 | End: 2019-07-21

## 2019-07-21 RX ORDER — PRAZOSIN HYDROCHLORIDE 2 MG/1
4 CAPSULE ORAL EVERY EVENING
COMMUNITY
End: 2021-02-03

## 2019-07-21 RX ORDER — PRAZOSIN HYDROCHLORIDE 2 MG/1
2 CAPSULE ORAL EVERY EVENING
Status: DISCONTINUED | OUTPATIENT
Start: 2019-07-21 | End: 2019-07-28 | Stop reason: HOSPADM

## 2019-07-21 RX ORDER — POLYETHYLENE GLYCOL 3350 17 G/17G
1 POWDER, FOR SOLUTION ORAL
Status: DISCONTINUED | OUTPATIENT
Start: 2019-07-21 | End: 2019-07-28 | Stop reason: HOSPADM

## 2019-07-21 RX ORDER — AMOXICILLIN 250 MG
2 CAPSULE ORAL 2 TIMES DAILY
Status: DISCONTINUED | OUTPATIENT
Start: 2019-07-21 | End: 2019-07-28 | Stop reason: HOSPADM

## 2019-07-21 RX ORDER — BUSPIRONE HYDROCHLORIDE 10 MG/1
10 TABLET ORAL 3 TIMES DAILY
COMMUNITY
End: 2021-02-03

## 2019-07-21 RX ORDER — ONDANSETRON 2 MG/ML
4 INJECTION INTRAMUSCULAR; INTRAVENOUS EVERY 4 HOURS PRN
Status: DISCONTINUED | OUTPATIENT
Start: 2019-07-21 | End: 2019-07-28 | Stop reason: HOSPADM

## 2019-07-21 RX ORDER — ONDANSETRON 4 MG/1
4 TABLET, ORALLY DISINTEGRATING ORAL EVERY 4 HOURS PRN
Status: DISCONTINUED | OUTPATIENT
Start: 2019-07-21 | End: 2019-07-28 | Stop reason: HOSPADM

## 2019-07-21 RX ORDER — SERTRALINE HYDROCHLORIDE 100 MG/1
100 TABLET, FILM COATED ORAL EVERY MORNING
Status: DISCONTINUED | OUTPATIENT
Start: 2019-07-22 | End: 2019-07-23

## 2019-07-21 RX ORDER — LEVOTHYROXINE SODIUM 0.15 MG/1
150 TABLET ORAL
Status: DISCONTINUED | OUTPATIENT
Start: 2019-07-22 | End: 2019-07-28 | Stop reason: HOSPADM

## 2019-07-21 RX ORDER — TETRACYCLINE HYDROCHLORIDE 250 MG/1
500 CAPSULE ORAL 2 TIMES DAILY
Status: ON HOLD | COMMUNITY
End: 2019-07-28

## 2019-07-21 RX ORDER — FAMOTIDINE 20 MG/1
20 TABLET, FILM COATED ORAL 2 TIMES DAILY
Status: DISCONTINUED | OUTPATIENT
Start: 2019-07-21 | End: 2019-07-28 | Stop reason: HOSPADM

## 2019-07-21 RX ADMIN — IOHEXOL 40 ML: 350 INJECTION, SOLUTION INTRAVENOUS at 17:55

## 2019-07-21 RX ADMIN — IOHEXOL 100 ML: 350 INJECTION, SOLUTION INTRAVENOUS at 17:54

## 2019-07-21 ASSESSMENT — COPD QUESTIONNAIRES
DO YOU EVER COUGH UP ANY MUCUS OR PHLEGM?: YES, EVERY DAY
COPD SCREENING SCORE: 6
HAVE YOU SMOKED AT LEAST 100 CIGARETTES IN YOUR ENTIRE LIFE: YES
DURING THE PAST 4 WEEKS HOW MUCH DID YOU FEEL SHORT OF BREATH: SOME OF THE TIME

## 2019-07-21 ASSESSMENT — ENCOUNTER SYMPTOMS
WEAKNESS: 1
HALLUCINATIONS: 0
HEARTBURN: 0
SENSORY CHANGE: 1
BLURRED VISION: 0
FLANK PAIN: 0
ABDOMINAL PAIN: 0
DOUBLE VISION: 0
MYALGIAS: 0
SHORTNESS OF BREATH: 0
DIZZINESS: 0
FOCAL WEAKNESS: 1
NAUSEA: 0
SPEECH CHANGE: 1
PALPITATIONS: 0
FEVER: 0
DEPRESSION: 0
EYE DISCHARGE: 0
VOMITING: 0
COUGH: 0
BRUISES/BLEEDS EASILY: 0
HEMOPTYSIS: 0
CHILLS: 0

## 2019-07-21 ASSESSMENT — LIFESTYLE VARIABLES
SUBSTANCE_ABUSE: 0
EVER_SMOKED: YES

## 2019-07-22 ENCOUNTER — APPOINTMENT (OUTPATIENT)
Dept: RADIOLOGY | Facility: MEDICAL CENTER | Age: 39
End: 2019-07-22
Attending: HOSPITALIST
Payer: MEDICAID

## 2019-07-22 PROBLEM — R21 RASH: Status: ACTIVE | Noted: 2019-07-22

## 2019-07-22 LAB
EST. AVERAGE GLUCOSE BLD GHB EST-MCNC: 237 MG/DL
GLUCOSE BLD-MCNC: 219 MG/DL (ref 65–99)
GLUCOSE BLD-MCNC: 247 MG/DL (ref 65–99)
GLUCOSE BLD-MCNC: 307 MG/DL (ref 65–99)
GLUCOSE BLD-MCNC: 310 MG/DL (ref 65–99)
HBA1C MFR BLD: 9.9 % (ref 0–5.6)

## 2019-07-22 PROCEDURE — 95951 EEG: CPT | Mod: 52 | Performed by: PSYCHIATRY & NEUROLOGY

## 2019-07-22 PROCEDURE — 99215 OFFICE O/P EST HI 40 MIN: CPT | Performed by: PSYCHIATRY & NEUROLOGY

## 2019-07-22 PROCEDURE — 97161 PT EVAL LOW COMPLEX 20 MIN: CPT

## 2019-07-22 PROCEDURE — 97162 PT EVAL MOD COMPLEX 30 MIN: CPT

## 2019-07-22 PROCEDURE — 70551 MRI BRAIN STEM W/O DYE: CPT

## 2019-07-22 PROCEDURE — 92610 EVALUATE SWALLOWING FUNCTION: CPT

## 2019-07-22 PROCEDURE — 95951 EEG: CPT | Mod: 26,52 | Performed by: PSYCHIATRY & NEUROLOGY

## 2019-07-22 PROCEDURE — 82962 GLUCOSE BLOOD TEST: CPT | Mod: 91

## 2019-07-22 PROCEDURE — 700102 HCHG RX REV CODE 250 W/ 637 OVERRIDE(OP): Performed by: HOSPITALIST

## 2019-07-22 PROCEDURE — A9270 NON-COVERED ITEM OR SERVICE: HCPCS | Performed by: HOSPITALIST

## 2019-07-22 PROCEDURE — 96372 THER/PROPH/DIAG INJ SC/IM: CPT

## 2019-07-22 PROCEDURE — 97166 OT EVAL MOD COMPLEX 45 MIN: CPT

## 2019-07-22 PROCEDURE — G0378 HOSPITAL OBSERVATION PER HR: HCPCS

## 2019-07-22 PROCEDURE — 99226 PR SUBSEQUENT OBSERVATION CARE,LEVEL III: CPT | Performed by: HOSPITALIST

## 2019-07-22 RX ADMIN — BUSPIRONE HYDROCHLORIDE 10 MG: 10 TABLET ORAL at 17:07

## 2019-07-22 RX ADMIN — INSULIN HUMAN 4 UNITS: 100 INJECTION, SOLUTION PARENTERAL at 17:05

## 2019-07-22 RX ADMIN — INSULIN HUMAN 2 UNITS: 100 INJECTION, SOLUTION PARENTERAL at 00:37

## 2019-07-22 RX ADMIN — INSULIN HUMAN 4 UNITS: 100 INJECTION, SOLUTION PARENTERAL at 13:54

## 2019-07-22 RX ADMIN — DIVALPROEX SODIUM 500 MG: 500 TABLET, DELAYED RELEASE ORAL at 17:08

## 2019-07-22 RX ADMIN — BUSPIRONE HYDROCHLORIDE 10 MG: 10 TABLET ORAL at 12:52

## 2019-07-22 RX ADMIN — FAMOTIDINE 20 MG: 20 TABLET ORAL at 17:08

## 2019-07-22 RX ADMIN — LEVOTHYROXINE SODIUM 150 MCG: 150 TABLET ORAL at 12:52

## 2019-07-22 RX ADMIN — ASPIRIN 300 MG: 300 SUPPOSITORY RECTAL at 06:09

## 2019-07-22 RX ADMIN — SENNOSIDES, DOCUSATE SODIUM 2 TABLET: 50; 8.6 TABLET, FILM COATED ORAL at 12:52

## 2019-07-22 RX ADMIN — ATORVASTATIN CALCIUM 80 MG: 80 TABLET, FILM COATED ORAL at 21:27

## 2019-07-22 RX ADMIN — PRAZOSIN HYDROCHLORIDE 2 MG: 2 CAPSULE ORAL at 17:08

## 2019-07-22 RX ADMIN — DIVALPROEX SODIUM 500 MG: 500 TABLET, DELAYED RELEASE ORAL at 12:52

## 2019-07-22 RX ADMIN — FAMOTIDINE 20 MG: 20 TABLET ORAL at 12:52

## 2019-07-22 RX ADMIN — SERTRALINE HYDROCHLORIDE 100 MG: 100 TABLET ORAL at 12:51

## 2019-07-22 RX ADMIN — SENNOSIDES, DOCUSATE SODIUM 2 TABLET: 50; 8.6 TABLET, FILM COATED ORAL at 17:05

## 2019-07-22 ASSESSMENT — COGNITIVE AND FUNCTIONAL STATUS - GENERAL
MOVING TO AND FROM BED TO CHAIR: A LOT
EATING MEALS: A LITTLE
WALKING IN HOSPITAL ROOM: TOTAL
DAILY ACTIVITIY SCORE: 14
MOVING FROM LYING ON BACK TO SITTING ON SIDE OF FLAT BED: UNABLE
CLIMB 3 TO 5 STEPS WITH RAILING: TOTAL
DRESSING REGULAR LOWER BODY CLOTHING: TOTAL
MOVING FROM LYING ON BACK TO SITTING ON SIDE OF FLAT BED: A LOT
SUGGESTED CMS G CODE MODIFIER DAILY ACTIVITY: CK
MOBILITY SCORE: 11
PERSONAL GROOMING: A LITTLE
SUGGESTED CMS G CODE MODIFIER MOBILITY: CN
SUGGESTED CMS G CODE MODIFIER DAILY ACTIVITY: CK
TOILETING: A LOT
HELP NEEDED FOR BATHING: A LOT
MOBILITY SCORE: 6
DRESSING REGULAR UPPER BODY CLOTHING: TOTAL
DRESSING REGULAR LOWER BODY CLOTHING: A LOT
MOVING TO AND FROM BED TO CHAIR: UNABLE
STANDING UP FROM CHAIR USING ARMS: TOTAL
DAILY ACTIVITIY SCORE: 14
HELP NEEDED FOR BATHING: A LOT
SUGGESTED CMS G CODE MODIFIER MOBILITY: CL
TOILETING: A LOT
WALKING IN HOSPITAL ROOM: TOTAL
STANDING UP FROM CHAIR USING ARMS: TOTAL
TURNING FROM BACK TO SIDE WHILE IN FLAT BAD: UNABLE
CLIMB 3 TO 5 STEPS WITH RAILING: TOTAL
DRESSING REGULAR UPPER BODY CLOTHING: A LOT

## 2019-07-22 ASSESSMENT — ENCOUNTER SYMPTOMS
FOCAL WEAKNESS: 1
SPEECH CHANGE: 1
SORE THROAT: 0
MUSCULOSKELETAL NEGATIVE: 1
COUGH: 0
NAUSEA: 0
GASTROINTESTINAL NEGATIVE: 1
DIAPHORESIS: 0
CONSTITUTIONAL NEGATIVE: 1
ABDOMINAL PAIN: 0
PALPITATIONS: 0
CARDIOVASCULAR NEGATIVE: 1
VOMITING: 0
WEAKNESS: 0
PSYCHIATRIC NEGATIVE: 1
DIZZINESS: 0
WEIGHT LOSS: 0
DEPRESSION: 0
CHILLS: 0
HEADACHES: 0
FEVER: 0
BRUISES/BLEEDS EASILY: 0
SHORTNESS OF BREATH: 0
RESPIRATORY NEGATIVE: 1
EYES NEGATIVE: 1
MYALGIAS: 0
BLURRED VISION: 0

## 2019-07-22 ASSESSMENT — ACTIVITIES OF DAILY LIVING (ADL): TOILETING: INDEPENDENT

## 2019-07-22 ASSESSMENT — GAIT ASSESSMENTS: GAIT LEVEL OF ASSIST: UNABLE TO PARTICIPATE

## 2019-07-22 ASSESSMENT — LIFESTYLE VARIABLES: SUBSTANCE_ABUSE: 0

## 2019-07-22 NOTE — ED NOTES
Pt report called in to Juan ANNE, questions and comments addressed. Pt now awaiting transport to S176/01.

## 2019-07-22 NOTE — RESPIRATORY CARE
COPD EDUCATION by COPD CLINICAL EDUCATOR  7/22/2019 at 9:15 AM by Jacinta Ribeiro     Patient reviewed by COPD education team. Patient does not qualify for the COPD program.

## 2019-07-22 NOTE — ASSESSMENT & PLAN NOTE
Ongoing hyperglycemia, per nursing, he ate a large bag of M&M's last night  Dietary compliance discussed and recommended  Continue metformin  Continue SSI  accu checks  Following

## 2019-07-22 NOTE — DISCHARGE PLANNING
PMR referral from Dr. 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 Will follow No physiatry consult ordered at this time.

## 2019-07-22 NOTE — ED TRIAGE NOTES
Norm Prabhakar  Pt [facility]. Stroke pre-alert    Chief Complaint   Patient presents with   • Possible Stroke     Per EMS, pt currently living at a care facility. At approx 1200, pt started having s/s that were concerning for stroke. EMS reports RIGHT sided extremity weakness and numbness, RIGHT facial numbness, and LEFT facial droop. ER door time of 1734, labs drawn by EMS and sent to lab. Dr Hoffman at bedside for initial evaluation and NIH assessment. ERP noted pt to be drowsy, unable to answer LOC questions, no movement of RIGHT arm and leg, loss of sensation, and dysarthria. NIH calculated at 15. Pt to CT stat and now back in ED room. Pt has hx of DM, EMS reports BS of 233. Pt denies taking any blood thinners. Pt noted to have asa on medication list.

## 2019-07-22 NOTE — PROCEDURES
VIDEO ELECTROENCEPHALOGRAM REPORT      Referring provider: Dr. Leyva.     DOS: 7/22/2019 (total recording of 28 minutes).     INDICATION:  Norm Prabhakar 39 y.o. male presenting with hemiparesis, history of bipolar, partial seizure.     CURRENT ANTIEPILEPTIC REGIMEN: valproic acid.     TECHNIQUE: 30 channel video electroencephalogram (EEG) was performed in accordance with the international 10-20 system. The study was reviewed in bipolar and referential montages. The recording examined the patient during wakeful and drowsy/sleep state(s).     DESCRIPTION OF THE RECORD:  During the wakefulness, the background showed a symmetrical 9 Hz alpha activity posteriorly with amplitude of 70 mV.  There was reactivity to eye closure/opening.  A normal anterior-posterior gradient was noted with faster beta frequencies seen anteriorly.  During drowsiness, theta/delta frequencies were seen.    During the sleep state, background shows diffuse high-amplitude 4-5 Hz delta activity.  Symmetrical high-amplitude sleep spindles and vertex sharps were seen in the leads over the central regions.     ACTIVATION PROCEDURES:   Intermittent Photic stimulation was performed in a stepwise fashion from 1 to 30 Hz and elicited a normal response (photic driving), most noticeable in the posterior leads.    ICTAL AND/OR INTERICTAL FINDINGS:   Frequent, independent, right and left temporal sharps. No clinical events or seizures were reported or recorded during the study.     EKG: sampling of the EKG recording demonstrated sinus rhythm.     EVENTS:  None.     INTERPRETATION:  This is an abnormal video EEG recording in the awake, drowsy, and sleep state(s).  Frequent, independent, right and left temporal sharps noted. The findings increase risk for seizures and suggest underlying areas of cortical irritability and possibly structural abnormality. No seizures captured during the study. Clinical and radiological correlation is recommended.        Obi  MD Jerrica   Epilepsy and Neurodiagnostics.   Clinical  of Neurology [facility].   Diplomate in Neurology, Epilepsy, and Electrodiagnostic Medicine.   Office: [phone]  Fax: [phone]

## 2019-07-22 NOTE — THERAPY
"  Speech Language Therapy Clinical Swallow Evaluation completed.  Functional Status: Patient seen for swallow evaluation this date.  Patient awake, alert and oriented in all spheres.  Speech slow at times with groping and dysfluencies noted.  There were times during conversational speech in which speech was fluent and at a normal rate. On palpation of the anterior neck during swallow, there was a large lump noted at the area of the thyroid cartilage.  Patient reports that this has been there for years, but does appear to be getting larger.  Advised to seek out patient ENT consult.  He does present with malocclusion/prognathism, so his bite is off.  Furthermore, he does report right upper molar sensitivity to cold foods--tooth does look caried, but he indicated he is trying to loosen it himself so that it will fall out.  He refuses to seek dental consult because \"I hate dentists.\"  Sensation testing to the face was inconsistent.  With eyes closed, patient would always report when left side of face was being touched.  When right side was touched, he would consistently report \"I don't feel that,\" and when both sides were being touched, he reported \"I feel the left but not the right.\"  Presentation of PO consisted of ice, nectars, thin liquids, purees, and soft solids.  Patient with prolonged, slow mastication with decreased rotary movement of the jaw.  He was able to swallow all textures without any overt S/Sx of aspiration noted.  He was able to feed himself with his left hand, but has no movement of the right side per his report.  At this time, would recommend dysphagia II diet with thin liquids and monitoring during meals. SLP will continue to follow.  MRI results still pending. Thank you for the consult.     Recommendations - Diet:  Dysphagia II diet with thin liquids                          Strategies: Monitor during meals, Assistance needed for meal tray set-up and Head of Bed at 90 Degrees                       " "   Medication Administration:    Plan of Care: Will benefit from Speech Therapy 3 times per week  Post-Acute Therapy: to be determined depending on results of MRI and progression with therapies     See \"Rehab Therapy-Acute\" Patient Summary Report for complete documentation.     "

## 2019-07-22 NOTE — ED NOTES
Pt updated on poc and admit plans. PIV remains CDI and patent.   Room checked and all pt belongings transported with pt. Pt transported by transport to S176/01 now.

## 2019-07-22 NOTE — PROGRESS NOTES
Right side facial droop, right side hemiparesis, disoriented to time, slowed speech. 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 NPO, speech evaluation ordered. Denies pain, -H/A, -SOB. Voids using urinal. Fall/seizure precautions, call light in place.

## 2019-07-22 NOTE — ASSESSMENT & PLAN NOTE
Reportedly present for several months  Suspect due to recent tetracycline  Continues to improve  following

## 2019-07-22 NOTE — PROGRESS NOTES
[facility] Daily Progress Note    Date of Service  7/22/2019    Chief Complaint  39 y.o. male admitted 7/21/2019 with right sided weakness    Hospital Course        Patient is a 39 y.o. male who presented 7/21/2019 with past medical history of asthma, bipolar, depression, hypothyroidism, seizure disorder who presents with right-sided hemiparesis.  This patient lives in a group home.  Apparently woke up with right arm and right leg hemiparesis.  He has similar presentations in the past that were consistent with possible Santosh's paralysis versus conversion disorder.    Interval Problem Update  Slightly slurred speech with delayed responses fluctuating, same noted by speech on their exam  Axox3, denies pain  No sob  Denies numbness or tingling  No seizure activity noted since admission  Denies pain, or itching  Ros otherwise negative    Consultants/Specialty  Neurology    Code Status  full    Disposition  tbd    Review of Systems  Review of Systems   Constitutional: Negative.  Negative for chills, diaphoresis, fever, malaise/fatigue and weight loss.   HENT: Negative.  Negative for sore throat.    Eyes: Negative.  Negative for blurred vision.   Respiratory: Negative.  Negative for cough and shortness of breath.    Cardiovascular: Negative.  Negative for chest pain, palpitations and leg swelling.   Gastrointestinal: Negative.  Negative for abdominal pain, nausea and vomiting.   Genitourinary: Negative.  Negative for dysuria.   Musculoskeletal: Negative.  Negative for myalgias.   Skin: Negative.  Negative for itching and rash.   Neurological: Positive for speech change and focal weakness. Negative for dizziness, weakness and headaches.   Endo/Heme/Allergies: Negative.  Does not bruise/bleed easily.   Psychiatric/Behavioral: Negative.  Negative for depression, substance abuse and suicidal ideas.   All other systems reviewed and are negative.       Physical Exam  Temp:  [36.3 °C (97.3 °F)-36.7 °C (98 °F)] 36.3 °C  (97.3 °F)  Pulse:  [64-90] 64  Resp:  [12-20] 12  BP: ()/(48-76) 92/48  SpO2:  [90 %-99 %] 90 %    Physical Exam   Constitutional: He is oriented to person, place, and time. He appears well-developed and well-nourished. No distress.   HENT:   Head: Normocephalic and atraumatic.   Right Ear: External ear normal.   Left Ear: External ear normal.   Nose: Nose normal.   Mouth/Throat: Oropharynx is clear and moist. No oropharyngeal exudate.   Eyes: Pupils are equal, round, and reactive to light. Conjunctivae and EOM are normal. Right eye exhibits no discharge. Left eye exhibits no discharge. No scleral icterus.   Neck: Normal range of motion. Neck supple. No JVD present. No tracheal deviation present. No thyromegaly present.   Suspected goiter- reportedly chronic - outpatient f/u recommended   Cardiovascular: Normal rate, regular rhythm, normal heart sounds and intact distal pulses.  Exam reveals no gallop and no friction rub.    No murmur heard.  Pulmonary/Chest: Effort normal and breath sounds normal. No stridor. No respiratory distress. He has no wheezes. He has no rales. He exhibits no tenderness.   Abdominal: Soft. Bowel sounds are normal. He exhibits no distension and no mass. There is no tenderness. There is no rebound and no guarding.   Musculoskeletal: Normal range of motion. He exhibits no edema, tenderness or deformity.   Lymphadenopathy:     He has no cervical adenopathy.   Neurological: He is alert and oriented to person, place, and time. He has normal reflexes. No cranial nerve deficit. He exhibits normal muscle tone. Coordination normal.   Skin: Skin is warm and dry. No rash noted. He is not diaphoretic. No erythema. No pallor.   Diffuse milbiloform rash, left arm and face spared, blanching   Psychiatric: He has a normal mood and affect. His behavior is normal. Judgment and thought content normal.   Nursing note and vitals reviewed.      Fluids  No intake or output data in the 24 hours ending  07/22/19 1140    Laboratory  Recent Labs      07/21/19   1739   WBC  11.4*   RBC  4.18*   HEMOGLOBIN  12.8*   HEMATOCRIT  38.4*   MCV  91.9   MCH  30.6   MCHC  33.3*   RDW  42.5   PLATELETCT  292   MPV  9.5     Recent Labs      07/21/19   1739   SODIUM  134*   POTASSIUM  4.1   CHLORIDE  99   CO2  24   GLUCOSE  251*   BUN  22   CREATININE  0.90   CALCIUM  9.4     Recent Labs      07/21/19   1739   APTT  28.4   INR  0.93               Imaging  MR-BRAIN-W/O   Final Result      1.  Diffuse abnormal supratentorial white matter T2 hyperintensity. This is unchanged since the previous MRI dated 10/3/2012. This finding likely represents chronic white matter injury which can be secondary to the chronic sequela of toxic, metabolic,    microvascular, infectious ,inflammatory, demyelinating or dysmyelinating etiologies.   2.  Moderate cerebral atrophy.   3.  No acute abnormality.   4.  Stable cystlike structure abutting left anterior globe .      DX-CHEST-PORTABLE (1 VIEW)   Final Result      Hypoaeration changes without acute abnormality.      CT-CTA HEAD WITH & W/O-POST PROCESS   Final Result      No significant abnormality of the intracranial arteries.      CT-CTA NECK WITH & W/O-POST PROCESSING   Final Result      No significant abnormality of the neck arteries.      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      CT-HEAD W/O   Final Result      1.  No CT evidence of acute infarct, hemorrhage or mass.   2.  Unchanged confluent areas of white matter hypoattenuation, suggestive of chronic small vessel ischemic changes, demyelination or gliosis.      CT-CEREBRAL PERFUSION ANALYSIS   Final Result      1.  Cerebral blood flow less than 30% likely representing completed infarct = 0 mL.      2.  T Max more than 6 seconds likely representing combination of completed infarct and ischemia = 0 mL.      3.  Mismatched volume likely representing ischemic brain/penumbra = None      4.  Please note  that the cerebral perfusion was performed on the limited brain tissue around the basal ganglia region. Infarct/ischemia outside the CT perfusion sections can be missed in this study.      EC-ECHOCARDIOGRAM COMPLETE W/O CONT    (Results Pending)         Assessment/Plan  * Acute right-sided weakness- (present on admission)   Assessment & Plan    This patient has hx of conversion and todds paralysis   CTA in ER wnl  EEG pending eeg  Continue Asa, statin   MRI brain shows no acute findings  Echocardiogram pending  Pt/ot evals pending  Neuro to eval     Leukocytosis- (present on admission)   Assessment & Plan    Suspect reactive  Will recheck in am     Rash   Assessment & Plan    Reportedly present for several months, denies recent abx  following     HLD (hyperlipidemia)- (present on admission)   Assessment & Plan    Continue statin therapy      Type 2 diabetes mellitus without complication, without long-term current use of insulin (HCC)- (present on admission)   Assessment & Plan    Home medications held  SSI ordered  accu checks  Following     PTSD (post-traumatic stress disorder)- (present on admission)   Assessment & Plan    Hx of resume home buspar, zoflot     Seizure (HCC)- (present on admission)   Assessment & Plan    EEG pending  Continue depakote   Seizure precautions      Anemia- (present on admission)   Assessment & Plan    Mild, no evidence of bleeding   Cont to monitor      Asthma- (present on admission)   Assessment & Plan    No evidence of acute exacerbation   Continue singulair  Continue resp care protocol      Obesity- (present on admission)   Assessment & Plan    Encouraged weight loss     Acquired hypothyroidism- (present on admission)   Assessment & Plan    Continue levothyroxine   Tsh elevated but decreased in comparison to march levels            VTE prophylaxis: scd

## 2019-07-22 NOTE — PROGRESS NOTES
2RN skin check: rash all over patient's body including back, trunk, sacrum, bilat UE/LE; otherwise skin intact, blanching. Flakiness on BLE, calluses on bilat feet.

## 2019-07-22 NOTE — THERAPY
"Occupational Therapy Evaluation completed.   Functional Status: Supine > EOB mod A, LB dressing max A, sit to stands mod/max A (2-person for safety), unable to pivot   Plan of Care: Will benefit from Occupational Therapy 4 times per week  Discharge Recommendations:  Equipment: Will Continue to Assess for Equipment Needs. Post-acute therapy: Recommend post-acute placement for additional occupational therapy services prior to discharge home. Patient can tolerate post-acute therapies at a 5x/week frequency.    See \"Rehab Therapy-Acute\" Patient Summary Report for complete documentation.    39 y.o. male with h/o seizure d/o, bipolar, L eye blindness, admitted with R-sided weakness. Dx with conversion d/o versus Santosh's paralysis. Pt has had at least two prior admissions with similar presentation. Pt lives in [facility] where he has assist with all I-ADL and meds. Seen now for OT eval. Presents with very flat affect, but pleasant and cooperative. On assessment, pt presents with 0/5 to 1/5 strength RUE and RLE. He mobilized to/from EOB, stood with 2-person assist, but was unable to pivot or take side steps. Pt did perform seated lateral scooting to R. Pt is limited by R-sided weakness negatively impacting basic ADL and transfers. Acute OT to follow to train on compensatory ADL, progress safe transfers and standing activity, facilitate functional return/strengthening of RUE, assist with DC planning and DME needs.     "

## 2019-07-22 NOTE — ASSESSMENT & PLAN NOTE
Continue levothyroxine   Tsh elevated but decreased in comparison to march levels  Likely contributing to his depression  Recommend repeat thyroid function tests in 4 weeks

## 2019-07-22 NOTE — PROGRESS NOTES
2 RN skin check completed with [facility] G[facility].   Devices in place SCDs, PIV.  Skin assessed under devices intact, blanching.  Patient has generalized rash all over body including trunk, back, LUE and bilateral LEs, sacrum. Sacrum is intact, blanching. Patient's bilateral legs are flaky and bilateral feet are callused. Heels pink, blanching.   The following interventions in place: Waffle mattress placed and now in use, pillows in use for support/repositioning, patient can turn self fairly well, encouraging turning and assisting with weaker side, pillows in use to float heels.

## 2019-07-22 NOTE — ASSESSMENT & PLAN NOTE
This patient has hx of conversion   Not consistent with Santosh's paralysis  Functional component and improving  CTA in ER wnl  PT recommended snf but not a candidate as observation and weakness resolved this am  Continue Asa, statin   MRI brain shows no acute findings  Echocardiogram wnl  Seen by neuro who has signed off - suspect functional weakness - psych eval recommended and requested  Group home to eval pending to see if they can take him back

## 2019-07-22 NOTE — CONSULTS
"Chief Complaint   Patient presents with   • Possible Stroke       Problem List Items Addressed This Visit     Weakness      Neurology Consultation     History of present illness:  This is a 39-year old male with PMhx significant for anxiety/depression, Bipolar disorder, PTSD, developmental delay/intellectual disorder and resides in a group home, hypothyroid, diabetes mellitus, and ?history of seizure disorder (only taking Depakote 500 mg BID) who presented to [facility] for a chief complaint of Right sided weakness. Patient states he was laying down in recliner chair around 1700 yesterday evening when he suddenly began to experience RUE/RLE weakness and numbness.  Of note, in ED patient's story was reportedly different-- he apparently awoke around 1200 with the above symptoms. At any rate, in ED, CT head revealed no acute intracranial abnormality; CTA head/neck without LVO. Patient ultimately determined not to be a candidate for IV tPA secondary to unclear LKW time/patient awoke with symptoms. Currently, patient is sitting up in bed; awake and alert. Still with severe RUE/RLE weakness. Patient reports that he has seizure at least twice per month; describes them as \"grand mal\" and states that he does not usually recall the episodes. Cannot remember when last seizure was. He also admits to a remote history of similar symptoms (RUE/RLE weakness), resolved spontaneously; cannot provide further details regarding this diagnosis.     Neurology has been consulted by Dr. Malgorzata Leyva to further evaluate the findings noted above.     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   • Hypothyroidism    • Indigestion     once in a while   • Mental disorder     learning disabilities; speech impairment; " developmental delays   • Murmur     since birth   • Pneumonia     remote   • Psychiatric problem 2002    PTSD   • S/P thyroidectomy    • Seizure (HCC) 2010   • Seizure disorder (HCC)    • Unspecified disorder of thyroid        Past surgical history:   Past Surgical History:   Procedure Laterality Date   • EYE SURGERY     • OTHER      Hernia Repair when he was 10 yrs old   • THYROID LOBECTOMY         Family history:   Family History   Problem Relation Age of Onset   • Hypertension Mother    • Heart Disease Mother    • Lung Disease Mother    • Stroke Maternal Grandmother        Social history:   Social History     Social History   • Marital status: Single     Spouse name: N/A   • Number of children: N/A   • Years of education: N/A     Occupational History   • Not on file.     Social History Main Topics   • Smoking status: Former Smoker     Packs/day: 0.25     Types: Cigarettes, Cigars     Quit date: 5/1/2018   • Smokeless tobacco: Never Used   • Alcohol use No   • Drug use: Yes     Types: Inhaled      Comment: marijuana   • Sexual activity: Not on file     Other Topics Concern   • Not on file     Social History Narrative   • No narrative on file       Current medications:   Current Facility-Administered Medications   Medication Dose   • Pharmacy consult request - Allow for permissive hypertension: SBP up to 220 mmHg/DBP up to 120 mmHg x 48 hours     • aspirin (ASA) tablet 325 mg  325 mg    Or   • aspirin (ASA) chewable tab 324 mg  324 mg    Or   • aspirin (ASA) suppository 300 mg  300 mg   • senna-docusate (PERICOLACE or SENOKOT S) 8.6-50 MG per tablet 2 Tab  2 Tab    And   • polyethylene glycol/lytes (MIRALAX) PACKET 1 Packet  1 Packet    And   • magnesium hydroxide (MILK OF MAGNESIA) suspension 30 mL  30 mL    And   • bisacodyl (DULCOLAX) suppository 10 mg  10 mg   • ondansetron (ZOFRAN) syringe/vial injection 4 mg  4 mg   • ondansetron (ZOFRAN ODT) dispertab 4 mg  4 mg   • promethazine (PHENERGAN) tablet 12.5-25 mg   "12.5-25 mg   • promethazine (PHENERGAN) suppository 12.5-25 mg  12.5-25 mg   • prochlorperazine (COMPAZINE) injection 5-10 mg  5-10 mg   • atorvastatin (LIPITOR) tablet 80 mg  80 mg   • busPIRone (BUSPAR) tablet 10 mg  10 mg   • divalproex (DEPAKOTE) delayed-release tablet 500 mg  500 mg   • levothyroxine (SYNTHROID) tablet 150 mcg  150 mcg   • prazosin (MINIPRESS) capsule 2 mg  2 mg   • sertraline (ZOLOFT) tablet 100 mg  100 mg   • Respiratory Care per Protocol     • insulin regular (HUMULIN R) injection 1-6 Units  1-6 Units    And   • glucose 4 g chewable tablet 16 g  16 g    And   • DEXTROSE 10% BOLUS 250 mL  250 mL   • albuterol inhaler 2 Puff  2 Puff   • famotidine (PEPCID) tablet 20 mg  20 mg       Medication Allergy:  Allergies   Allergen Reactions   • Abilify Unspecified     \"Feeling tired, like I don't even know whats going on around me\"   • Fish      Pt reports fish causes him to be sick to his stomach         Review of systems:   Constitutional: denies fever, night sweats, weight loss.   Eyes: denies acute vision change, eye pain or secretion.   Ears, Nose, Mouth, Throat: denies nasal secretion, nasal bleeding, difficulty swallowing, hearing loss, tinnitus, vertigo, ear pain, acute dental problems, oral ulcers or lesions.   Endocrine: denies recent weight changes, heat or cold intolerance, polyuria, polydypsia, polyphagia,abnormal hair growth.  Cardiovascular: denies new onset of chest pain, palpitations, syncope, or dyspnea of exertion.  Pulmonary: denies shortness of breath, new onset of cough, hemoptysis, wheezing, chest pain or flu-like symptoms.   GI: denies nausea, vomiting, diarrhea, GI bleeding, change in appetite, abdominal pain, and change in bowel habits.  : denies dysuria, urinary incontinence, hematuria.  Heme/oncology: denies history of easy bruising or bleeding. No history of cancer, DVTor PE.  Allergy/immunology: denies hives/urticaria, or itching.   Dermatologic: denies new rash/new " "skin lesions.  Musculoskeletal:denies joint swelling or pain, muscle pain, neck and back pain. Neurologic: As noted above.   Psychiatric: denies symptoms of depression, anxiety, hallucinations, mood swings or changes, suicidal or homicidal thoughts.     Physical examination:   Vitals:    07/21/19 2330 07/22/19 0400 07/22/19 0710 07/22/19 1105   BP: 117/76 100/52 (!) 92/48 114/70   Pulse:  64 64 63   Resp: 18 16 12 12   Temp: 36.3 °C (97.4 °F) 36.7 °C (98 °F) 36.3 °C (97.3 °F) 36.5 °C (97.7 °F)   TempSrc: Temporal Temporal Temporal Temporal   SpO2: 93% 99% 90% 95%   Weight: (!) 145.2 kg (320 lb 1.7 oz)      Height: 1.981 m (6' 6\")        General: Patient in no acute distress  HEENT: Normocephalic, no signs of acute trauma.   Neck: supple, no meningeal signs or carotid bruits. There is normal range of motion. No tenderness on exam.   Chest: clear to auscultation. No cough.   CV: RRR, no murmurs.   Skin: no signs of acute rashes or trauma.   Musculoskeletal: joints exhibit full range of motion, without any pain to palpation. There are no signs of joint or muscle swelling. There is no tenderness to deep palpation of muscles.   Psychiatric: No hallucinatory behavior. Denies symptoms of depression or suicidal ideation. Mood and affect appear normal on exam.     NEUROLOGICAL EXAM:   Mental status, orientation: Awake, alert and fully oriented.   Speech and language: speech is clear and fluent. The patient is able to name, repeat and comprehend.   Memory: There is intact recollection of recent and remote events.   Cranial nerve exam: Pupils are 3-4 mm bilaterally and equally reactive to light and accommodation. Visual fields are intact by confrontation. There is no nystagmus on primary or secondary gaze. Intact full EOM in all directions of gaze. Face appears symmetric. Sensation in the face is intact to light touch. Uvula is midline. Palate elevates symmetrically. Tongue is midline and without any signs of tongue biting or " fasciculations. Sternocleidomastoid muscles exhibit is normal strength bilaterally. Shoulder shrug is intact bilaterally.   Motor exam: Strength is 5/5 in LUE/LLE. No spontaneous/antigravity to RUE/RLE; flaccid. Tone is normal. No abnormal movements were seen on exam.   Sensory exam complete martinez-sensory loss to the Right, splitting at midline. Otherwise, reveals normal sense of light touch and pinprick in all extremities.   Deep tendon reflexes:  Diminished in setting of diabetes. Plantar responses are flexor. There is no clonus.   Coordination: LUE shows a normal finger-nose-finger. Normal rapidly alternating movements.   Gait: Not assessed at this time as patient is a fall risk.     NIH Stroke Scale    1a Level of Consciousness   1b Orientation Questions   1c Response to Commands   2 Gaze   3 Visual Fields   4 Facial Movement   5 Motor Function (arm)   a Left   b Right 3  6 Motor Function (leg)   a Left   b Right 3  7 Limb Ataxia   8 Sensory   9 Language   10 Articulation   11 Extinction/Inattention     Score:  6    ANCILLARY DATA REVIEWED:     Lab Data Review:  Recent Results (from the past 24 hour(s))   CBC WITH DIFFERENTIAL    Collection Time: 07/21/19  5:39 PM   Result Value Ref Range    WBC 11.4 (H) 4.8 - 10.8 K/uL    RBC 4.18 (L) 4.70 - 6.10 M/uL    Hemoglobin 12.8 (L) 14.0 - 18.0 g/dL    Hematocrit 38.4 (L) 42.0 - 52.0 %    MCV 91.9 81.4 - 97.8 fL    MCH 30.6 27.0 - 33.0 pg    MCHC 33.3 (L) 33.7 - 35.3 g/dL    RDW 42.5 35.9 - 50.0 fL    Platelet Count 292 164 - 446 K/uL    MPV 9.5 9.0 - 12.9 fL    Neutrophils-Polys 62.50 44.00 - 72.00 %    Lymphocytes 25.00 22.00 - 41.00 %    Monocytes 5.40 0.00 - 13.40 %    Eosinophils 0.90 0.00 - 6.90 %    Basophils 0.00 0.00 - 1.80 %    Nucleated RBC 0.20 /100 WBC    Neutrophils (Absolute) 7.33 1.82 - 7.42 K/uL    Lymphs (Absolute) 2.85 1.00 - 4.80 K/uL    Monos (Absolute) 0.62 0.00 - 0.85 K/uL    Eos (Absolute) 0.10 0.00 - 0.51 K/uL    Baso (Absolute) 0.00 0.00 - 0.12  K/uL    NRBC (Absolute) 0.02 K/uL   COMP METABOLIC PANEL    Collection Time: 07/21/19  5:39 PM   Result Value Ref Range    Sodium 134 (L) 135 - 145 mmol/L    Potassium 4.1 3.6 - 5.5 mmol/L    Chloride 99 96 - 112 mmol/L    Co2 24 20 - 33 mmol/L    Anion Gap 11.0 0.0 - 11.9    Glucose 251 (H) 65 - 99 mg/dL    Bun 22 8 - 22 mg/dL    Creatinine 0.90 0.50 - 1.40 mg/dL    Calcium 9.4 8.5 - 10.5 mg/dL    AST(SGOT) 13 12 - 45 U/L    ALT(SGPT) 13 2 - 50 U/L    Alkaline Phosphatase 57 30 - 99 U/L    Total Bilirubin 0.4 0.1 - 1.5 mg/dL    Albumin 4.5 3.2 - 4.9 g/dL    Total Protein 6.9 6.0 - 8.2 g/dL    Globulin 2.4 1.9 - 3.5 g/dL    A-G Ratio 1.9 g/dL   PROTHROMBIN TIME    Collection Time: 07/21/19  5:39 PM   Result Value Ref Range    PT 12.7 12.0 - 14.6 sec    INR 0.93 0.87 - 1.13   APTT    Collection Time: 07/21/19  5:39 PM   Result Value Ref Range    APTT 28.4 24.7 - 36.0 sec   COD (ADULT)    Collection Time: 07/21/19  5:39 PM   Result Value Ref Range    ABO Grouping Only A     Rh Grouping Only POS     Antibody Screen-Cod NEG    TROPONIN    Collection Time: 07/21/19  5:39 PM   Result Value Ref Range    Troponin T 14 6 - 19 ng/L   ESTIMATED GFR    Collection Time: 07/21/19  5:39 PM   Result Value Ref Range    GFR If African American >60 >60 mL/min/1.73 m 2    GFR If Non African American >60 >60 mL/min/1.73 m 2   VALPROIC ACID    Collection Time: 07/21/19  5:39 PM   Result Value Ref Range    Valproic Acid 38.4 (L) 50.0 - 100.0 ug/mL   DIFFERENTIAL MANUAL    Collection Time: 07/21/19  5:39 PM   Result Value Ref Range    Bands-Stabs 1.80 0.00 - 10.00 %    Metamyelocytes 0.90 %    Myelocytes 3.60 %    Manual Diff Status PERFORMED    PERIPHERAL SMEAR REVIEW    Collection Time: 07/21/19  5:39 PM   Result Value Ref Range    Peripheral Smear Review see below    PLATELET ESTIMATE    Collection Time: 07/21/19  5:39 PM   Result Value Ref Range    Plt Estimation Normal    MORPHOLOGY    Collection Time: 07/21/19  5:39 PM   Result Value  Ref Range    RBC Morphology Present     Smudge Cells Few    PROLACTIN    Collection Time: 19  5:39 PM   Result Value Ref Range    Prolactin 18.59 (H) 2.10 - 17.70 ng/mL   TSH    Collection Time: 19  5:39 PM   Result Value Ref Range    TSH 25.970 (H) 0.380 - 5.330 uIU/mL   EKG (NOW)    Collection Time: 19  6:36 PM   Result Value Ref Range    Report       [facility] Emergency Dept.    Test Date:  2019  Pt Name:    HOLLY KIM                 Department: ER  MRN:        [id number]                      Room:       [facility]  Gender:     Male                         Technician: [id number]  :        1980                   Requested By:JORDAN DUMONT  Order #:    [id number]                    Reading MD: Jordan DUMONT MD    Measurements  Intervals                                Axis  Rate:       81                           P:          46  DC:         168                          QRS:        -4  QRSD:       114                          T:          35  QT:         364  QTc:        423    Interpretive Statements  Normal sinus rhythm rate of 81.  Normal DC.  Widened QRS noted.  He does have  no  axis deviation.  No ST segment elevations or depressions.  No acute ischemic  findings abnormal EKG.    Electronically Signed On 2019 19:03:44 PDT by Jordan DUMONT MD     LACTIC ACID    Collection Time: 19  8:12 PM   Result Value Ref Range    Lactic Acid 2.2 (H) 0.5 - 2.0 mmol/L   Hemoglobin A1C    Collection Time: 19  8:12 PM   Result Value Ref Range    Glycohemoglobin 9.9 (H) 0.0 - 5.6 %    Est Avg Glucose 237 mg/dL   ACCU-CHEK GLUCOSE    Collection Time: 19 12:31 AM   Result Value Ref Range    Glucose - Accu-Ck 219 (H) 65 - 99 mg/dL   ACCU-CHEK GLUCOSE    Collection Time: 19  8:24 AM   Result Value Ref Range    Glucose - Accu-Ck 247 (H) 65 - 99 mg/dL       Labs reviewed by me.       Imaging reviewed by me:     MR-BRAIN-W/O   Final  Result      1.  Diffuse abnormal supratentorial white matter T2 hyperintensity. This is unchanged since the previous MRI dated 10/3/2012. This finding likely represents chronic white matter injury which can be secondary to the chronic sequela of toxic, metabolic,    microvascular, infectious ,inflammatory, demyelinating or dysmyelinating etiologies.   2.  Moderate cerebral atrophy.   3.  No acute abnormality.   4.  Stable cystlike structure abutting left anterior globe .      DX-CHEST-PORTABLE (1 VIEW)   Final Result      Hypoaeration changes without acute abnormality.      CT-CTA HEAD WITH & W/O-POST PROCESS   Final Result      No significant abnormality of the intracranial arteries.      CT-CTA NECK WITH & W/O-POST PROCESSING   Final Result      No significant abnormality of the neck arteries.      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      CT-HEAD W/O   Final Result      1.  No CT evidence of acute infarct, hemorrhage or mass.   2.  Unchanged confluent areas of white matter hypoattenuation, suggestive of chronic small vessel ischemic changes, demyelination or gliosis.      CT-CEREBRAL PERFUSION ANALYSIS   Final Result      1.  Cerebral blood flow less than 30% likely representing completed infarct = 0 mL.      2.  T Max more than 6 seconds likely representing combination of completed infarct and ischemia = 0 mL.      3.  Mismatched volume likely representing ischemic brain/penumbra = None      4.  Please note that the cerebral perfusion was performed on the limited brain tissue around the basal ganglia region. Infarct/ischemia outside the CT perfusion sections can be missed in this study.      EC-ECHOCARDIOGRAM COMPLETE W/O CONT    (Results Pending)       ASSESSMENT AND PLAN:  39-year old male with PMhx significant for anxiety/depression, Bipolar disorder, PTSD, developmental delay/intellectual disorder and resides in a group home, hypothyroid, diabetes mellitus, and  ?history of seizure disorder (only taking Depakote 500 mg BID) who presented to [facility] on 7/21/19 for a chief complaint of Right sided weakness upon waking; thus not a candidate for IV tPA. CT head without acute intracranial abnormality; CTA without LVO/acute thrombus. Additionally, patient had MRI Brain wo contrast today that revealed no acute intracranial abnormality (did note some degree of while matter disease, unchanged from previous study in 2012). Given then above, differential diagnoses at this time include seizure with associatedTodd's paralysis (EEG completed; read pending) or conversion disorder given extensive psychiatric history. May also consider acute cervical myelopathy, MRI C spine may be useful though patient denies recent trauma/fall and no organic sensory deficits.     Recommendations/Plan:     -q4h and PRN neuro assessment. VS per nursing/unit protocol. BP goal 140/90.   -EEG completed, read pending.   -For now, continue home dose Depakote 500 mg PO BID. Note VPA level mildly subtherapeutic at 38.4.  -Check lipid panel, A1c, TSH, RPR, B12, folate, ammonia.   -PT/OT eval and treat.   -Psychiatric consultation/evaluation.   -Provide counseling regarding life style and risk factor modification for primary stroke prevention.   -All other medical management per primary team.   -DVT Prophylaxis: SCDs.     The plan of care above has been discussed with Dr. Sinha.     Trinity Corado MSN, BSN, AGNP-C  [facility]

## 2019-07-22 NOTE — H&P
Hospital Medicine History & Physical Note    Date of Service  7/21/2019    Primary Care Physician  ANIKET Chung    Consultants  None    Code Status  full    Chief Complaint  Right arm weakness     History of Presenting Illness  39 y.o. male who presented 7/21/2019 with past medical history of asthma, bipolar, depression, hypothyroidism, seizure disorder who presents with right-sided hemiparesis.  This patient lives in a group home.  Apparently woke up with right arm and right leg hemiparesis.  He has similar presentations in the past that were consistent with possible Santosh's paralysis versus conversion disorder.  He has no known alleviating or exacerbating factors to his symptoms.  He was last seen normal around 12 PM thus no TPA was given.  He will be admitted to the hospital for further stroke evaluation.    Review of Systems  Review of Systems   Constitutional: Negative for chills and fever.   HENT: Negative for congestion, hearing loss and tinnitus.    Eyes: Negative for blurred vision, double vision and discharge.   Respiratory: Negative for cough, hemoptysis and shortness of breath.    Cardiovascular: Negative for chest pain, palpitations and leg swelling.   Gastrointestinal: Negative for abdominal pain, heartburn, nausea and vomiting.   Genitourinary: Negative for dysuria and flank pain.   Musculoskeletal: Negative for joint pain and myalgias.   Skin: Negative for rash.   Neurological: Positive for sensory change, speech change, focal weakness and weakness. Negative for dizziness.   Endo/Heme/Allergies: Negative for environmental allergies. Does not bruise/bleed easily.   Psychiatric/Behavioral: Negative for depression, hallucinations and substance abuse.       Past Medical History   has a past medical history of Anxiety; ASTHMA; Bipolar 1 disorder (HCC); Depression; Fall; Glaucoma; Glaucoma (1982); Hypothyroidism; Indigestion; Mental disorder; Murmur; Pneumonia; Psychiatric problem (2002); S/P  "thyroidectomy; Seizure (HCC) (2010); Seizure disorder (HCC); and Unspecified disorder of thyroid.    Surgical History   has a past surgical history that includes eye surgery; thyroid lobectomy; and other.     Family History  family history includes Heart Disease in his mother; Hypertension in his mother; Lung Disease in his mother; Stroke in his maternal grandmother.     Social History   reports that he quit smoking about 14 months ago. His smoking use included Cigarettes and Cigars. He smoked 0.25 packs per day. He has never used smokeless tobacco. He reports that he uses drugs, including Inhaled. He reports that he does not drink alcohol.    Allergies  Allergies   Allergen Reactions   • Abilify Unspecified     \"Feeling tired, like I don't even know whats going on around me\"   • Fish      Pt reports fish causes him to be sick to his stomach         Medications  Prior to Admission Medications   Prescriptions Last Dose Informant Patient Reported? Taking?   ASPIRIN ADULT LOW STRENGTH 81 MG EC tablet   Yes No   Sig: Take 81 mg by mouth.   Cholecalciferol ([facility] D3) 2000 UNIT Cap  MAR from Other Facility Yes No   Sig: Take 4,000 Units by mouth every evening.   Insulin Glargine (BASAGLAR KWIKPEN) 100 UNIT/ML Solution Pen-injector   Yes No   Sig: Inject  as instructed every evening.   LATUDA 20 MG Tab   Yes No   Sig: TAKE 1 TABLET BY MOUTH EACH EVENING WITH MEAL   acetaminophen/caffeine/butalbital 325-40-50 mg (FIORICET) -40 MG Tab   Yes No   Sig: Take 1 Tab by mouth every four hours as needed for Headache.   atorvastatin (LIPITOR) 80 MG tablet   No No   Sig: Take 1 Tab by mouth every day.   busPIRone (BUSPAR) 15 MG tablet   No No   Sig: Take 1 Tab by mouth 3 times a day.   cyclobenzaprine (FLEXERIL) 10 MG Tab   Yes No   Sig: TAKE 1 TABLET BY MOUTH TWICE A DAY AS NEEDED FOR BACK PAIN, MUSCLE SPASM   divalproex (DEPAKOTE) 500 MG Tablet Delayed Response   No No   Sig: Take 1-3 Tabs by mouth 2 Times a Day. 500 mg AM 1500 " mg PM   glimepiride (AMARYL) 4 MG Tab  MAR from Other Facility Yes No   Sig: Take 4 mg by mouth every morning.   hydrOXYzine HCl (ATARAX) 25 MG Tab   Yes No   Sig: TAKE 1 TABLET BY MOUTH 3 TIMES DAILY AS NEEDED FOR ANXIETY, SLEEP   levothyroxine (SYNTHROID) 150 MCG Tab   Yes No   Sig: Take 150 mcg by mouth.   metFORMIN (GLUCOPHAGE) 1000 MG tablet  MAR from Other Facility No No   Sig: Take 1 Tab by mouth 2 times a day, with meals.   montelukast (SINGULAIR) 10 MG Tab   No No   Sig: Take 1 Tab by mouth every day.   naproxen (NAPROSYN) 500 MG Tab   Yes No   Sig: TAKE 1 TABLET BY MOUTH TWICE A DAY AS NEEDED FOR PAIN  TAKE WITH FOOD   ondansetron (ZOFRAN) 4 MG Tab tablet   Yes No   Sig: Take 4 mg by mouth every four hours as needed for Nausea/Vomiting.   prazosin (MINIPRESS) 2 MG Cap   Yes No   Sig: TAKE 1 CAPSULE BY MOUTH NIGHTLY AT BEDTIME FOR NIGHTMARES   raNITidine (ZANTAC) 150 MG Tab   Yes No   Sig: TAKE 1 TABLET BY MOUTH TWICE A DAY TAKE AM AND PM ON EMPTY STOMACH   sertraline (ZOLOFT) 100 MG Tab   No No   Sig: Take 1 Tab by mouth every morning.      Facility-Administered Medications: None       Physical Exam  Pulse:  [79-90] 79  Resp:  [16-20] 18  BP: (150)/(52) 150/52  SpO2:  [92 %-96 %] 96 %    Physical Exam   Constitutional: He appears well-developed and well-nourished. He appears distressed.   HENT:   Head: Normocephalic and atraumatic.   Eyes: Pupils are equal, round, and reactive to light. Conjunctivae and EOM are normal.   Neck: Normal range of motion. Neck supple. No JVD present.   Cardiovascular: Normal rate, regular rhythm, normal heart sounds and intact distal pulses.    No murmur heard.  Pulmonary/Chest: Effort normal and breath sounds normal. No respiratory distress. He exhibits no tenderness.   Abdominal: Soft. Bowel sounds are normal. He exhibits no distension. There is no tenderness.   Musculoskeletal:   Right arm and leg hemiparesis    Neurological: He exhibits normal muscle tone.   Slurred speech    Skin: Skin is warm and dry. No erythema.   Psychiatric: He has a normal mood and affect. His behavior is normal. Judgment and thought content normal.   Nursing note and vitals reviewed.      Laboratory:  Recent Labs      07/21/19   1739   WBC  11.4*   RBC  4.18*   HEMOGLOBIN  12.8*   HEMATOCRIT  38.4*   MCV  91.9   MCH  30.6   MCHC  33.3*   RDW  42.5   PLATELETCT  292   MPV  9.5     Recent Labs      07/21/19   1739   SODIUM  134*   POTASSIUM  4.1   CHLORIDE  99   CO2  24   GLUCOSE  251*   BUN  22   CREATININE  0.90   CALCIUM  9.4     Recent Labs      07/21/19   1739   ALTSGPT  13   ASTSGOT  13   ALKPHOSPHAT  57   TBILIRUBIN  0.4   GLUCOSE  251*     Recent Labs      07/21/19   1739   APTT  28.4   INR  0.93     No results for input(s): NTPROBNP in the last 72 hours.      No results for input(s): TROPONINT in the last 72 hours.    Urinalysis:    No results found     Imaging:  DX-CHEST-PORTABLE (1 VIEW)   Final Result      Hypoaeration changes without acute abnormality.      CT-CTA HEAD WITH & W/O-POST PROCESS   Final Result      No significant abnormality of the intracranial arteries.      CT-CTA NECK WITH & W/O-POST PROCESSING   Final Result      No significant abnormality of the neck arteries.      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      CT-HEAD W/O   Final Result      1.  No CT evidence of acute infarct, hemorrhage or mass.   2.  Unchanged confluent areas of white matter hypoattenuation, suggestive of chronic small vessel ischemic changes, demyelination or gliosis.      CT-CEREBRAL PERFUSION ANALYSIS   Final Result      1.  Cerebral blood flow less than 30% likely representing completed infarct = 0 mL.      2.  T Max more than 6 seconds likely representing combination of completed infarct and ischemia = 0 mL.      3.  Mismatched volume likely representing ischemic brain/penumbra = None      4.  Please note that the cerebral perfusion was performed on the limited  brain tissue around the basal ganglia region. Infarct/ischemia outside the CT perfusion sections can be missed in this study.      MR-BRAIN-W/O    (Results Pending)   EC-ECHOCARDIOGRAM COMPLETE W/O CONT    (Results Pending)         Assessment/Plan:  I anticipate this patient is appropriate for observation status at this time.    * Acute right-sided weakness- (present on admission)   Assessment & Plan    This patient has hx of conversion and todds paralysis   Suspect likely todds paralysis given seizure disorder  Check eeg  Will need to r/o stroke   Asa, statin tordered  Ctp, cta unremarkable in ER   Check MRI brain   Echocardiogram   Pt/ot/speech evals   A1c, lipid panel   Consult Neuro in am     Leukocytosis- (present on admission)   Assessment & Plan    Suspect reactive, cont to montior   Potential seizure?     HLD (hyperlipidemia)- (present on admission)   Assessment & Plan    Resume home statin therapy      Type 2 diabetes mellitus without complication, without long-term current use of insulin (HCC)- (present on admission)   Assessment & Plan    Hold home medications   SSI ordered  accu checks     PTSD (post-traumatic stress disorder)- (present on admission)   Assessment & Plan    Hx of resume home buspar, zoflot     Seizure (HCC)- (present on admission)   Assessment & Plan    Probably uncontrolled  Check lactic, prolactin   EEG   Resume home depakote   Seizure precautions      Anemia- (present on admission)   Assessment & Plan    Mild, no evidence of bleeding   Cont to monitor      Asthma- (present on admission)   Assessment & Plan    Not in acute exacerbation   Resume home singulair  resp care protocol ordered     Obesity- (present on admission)   Assessment & Plan    Encouraged weight loss     Acquired hypothyroidism- (present on admission)   Assessment & Plan    Resume home levothyroxine   Recheck tsh was elevated last time         VTE prophylaxis: scd

## 2019-07-22 NOTE — THERAPY
"Pt is a 38 y/o male admitted for R sided weakness, CVA work up ongoing. Pt presents to acute PT with impairments in functional mobility, balance, gait, strength, and activity tolerance. Pt with inconsistencies noted during strength and sensory testing on R LE. During sensory testing (light touch) with eyes closed, pt was intact on the L but would reported he \"could not feel it\" when testing on R LE. When neither limb was touched, pt quickly stated PT wasn't touching either limb. Pt will benefit from acute PT interventions to address present impairments.         Physical Therapy Evaluation completed.   Bed Mobility:  Supine to Sit: Moderate Assist  Transfers: Sit to Stand: Maximal Assist  Gait: Level Of Assist: Unable to Participate       Plan of Care: Will benefit from Physical Therapy 4 times per week  Discharge Recommendations: Equipment: Will Continue to Assess for Equipment Needs.   Post-acute therapy: May require post acute placement prior to DC home, depending on progress made in acute setting.    See \"Rehab Therapy-Acute\" Patient Summary Report for complete documentation.     "

## 2019-07-22 NOTE — ED NOTES
Pt report received from Paige ANNE. Pt resting on leopoldo, no new need identified. Pt updated on poc.

## 2019-07-23 ENCOUNTER — APPOINTMENT (OUTPATIENT)
Dept: CARDIOLOGY | Facility: MEDICAL CENTER | Age: 39
End: 2019-07-23
Attending: HOSPITALIST
Payer: MEDICAID

## 2019-07-23 LAB
APPEARANCE UR: CLEAR
BILIRUB UR QL STRIP.AUTO: NEGATIVE
COLOR UR: YELLOW
GLUCOSE BLD-MCNC: 256 MG/DL (ref 65–99)
GLUCOSE BLD-MCNC: 261 MG/DL (ref 65–99)
GLUCOSE BLD-MCNC: 291 MG/DL (ref 65–99)
GLUCOSE BLD-MCNC: 294 MG/DL (ref 65–99)
GLUCOSE BLD-MCNC: 324 MG/DL (ref 65–99)
GLUCOSE UR STRIP.AUTO-MCNC: >=1000 MG/DL
KETONES UR STRIP.AUTO-MCNC: ABNORMAL MG/DL
LEUKOCYTE ESTERASE UR QL STRIP.AUTO: NEGATIVE
LV EJECT FRACT  99904: 60
MICRO URNS: ABNORMAL
NITRITE UR QL STRIP.AUTO: NEGATIVE
PH UR STRIP.AUTO: 6.5 [PH]
PROT UR QL STRIP: NEGATIVE MG/DL
RBC UR QL AUTO: NEGATIVE
SP GR UR STRIP.AUTO: 1.02
UROBILINOGEN UR STRIP.AUTO-MCNC: 0.2 MG/DL

## 2019-07-23 PROCEDURE — 93306 TTE W/DOPPLER COMPLETE: CPT | Mod: 26 | Performed by: INTERNAL MEDICINE

## 2019-07-23 PROCEDURE — 82962 GLUCOSE BLOOD TEST: CPT

## 2019-07-23 PROCEDURE — A9270 NON-COVERED ITEM OR SERVICE: HCPCS | Performed by: HOSPITALIST

## 2019-07-23 PROCEDURE — G0378 HOSPITAL OBSERVATION PER HR: HCPCS

## 2019-07-23 PROCEDURE — 99225 PR SUBSEQUENT OBSERVATION CARE,LEVEL II: CPT | Performed by: HOSPITALIST

## 2019-07-23 PROCEDURE — 700102 HCHG RX REV CODE 250 W/ 637 OVERRIDE(OP): Performed by: HOSPITALIST

## 2019-07-23 PROCEDURE — 96372 THER/PROPH/DIAG INJ SC/IM: CPT

## 2019-07-23 PROCEDURE — 93306 TTE W/DOPPLER COMPLETE: CPT

## 2019-07-23 PROCEDURE — 81003 URINALYSIS AUTO W/O SCOPE: CPT

## 2019-07-23 PROCEDURE — 92526 ORAL FUNCTION THERAPY: CPT

## 2019-07-23 RX ORDER — ACETAMINOPHEN 325 MG/1
650 TABLET ORAL EVERY 4 HOURS PRN
Status: DISCONTINUED | OUTPATIENT
Start: 2019-07-23 | End: 2019-07-28 | Stop reason: HOSPADM

## 2019-07-23 RX ADMIN — INSULIN HUMAN 4 UNITS: 100 INJECTION, SOLUTION PARENTERAL at 17:05

## 2019-07-23 RX ADMIN — ASPIRIN 325 MG: 325 TABLET, FILM COATED ORAL at 04:44

## 2019-07-23 RX ADMIN — INSULIN HUMAN 3 UNITS: 100 INJECTION, SOLUTION PARENTERAL at 00:44

## 2019-07-23 RX ADMIN — BUSPIRONE HYDROCHLORIDE 10 MG: 10 TABLET ORAL at 04:44

## 2019-07-23 RX ADMIN — ATORVASTATIN CALCIUM 80 MG: 80 TABLET, FILM COATED ORAL at 20:44

## 2019-07-23 RX ADMIN — LEVOTHYROXINE SODIUM 150 MCG: 150 TABLET ORAL at 04:44

## 2019-07-23 RX ADMIN — DIVALPROEX SODIUM 500 MG: 500 TABLET, DELAYED RELEASE ORAL at 04:44

## 2019-07-23 RX ADMIN — PRAZOSIN HYDROCHLORIDE 2 MG: 2 CAPSULE ORAL at 17:09

## 2019-07-23 RX ADMIN — BUSPIRONE HYDROCHLORIDE 10 MG: 10 TABLET ORAL at 17:09

## 2019-07-23 RX ADMIN — ACETAMINOPHEN 650 MG: 325 TABLET, FILM COATED ORAL at 09:42

## 2019-07-23 RX ADMIN — INSULIN HUMAN 2 UNITS: 100 INJECTION, SOLUTION PARENTERAL at 07:37

## 2019-07-23 RX ADMIN — INSULIN HUMAN 3 UNITS: 100 INJECTION, SOLUTION PARENTERAL at 12:08

## 2019-07-23 RX ADMIN — FAMOTIDINE 20 MG: 20 TABLET ORAL at 17:09

## 2019-07-23 RX ADMIN — FAMOTIDINE 20 MG: 20 TABLET ORAL at 04:44

## 2019-07-23 RX ADMIN — DIVALPROEX SODIUM 500 MG: 500 TABLET, DELAYED RELEASE ORAL at 17:09

## 2019-07-23 RX ADMIN — SERTRALINE HYDROCHLORIDE 100 MG: 100 TABLET ORAL at 04:44

## 2019-07-23 RX ADMIN — INSULIN HUMAN 3 UNITS: 100 INJECTION, SOLUTION PARENTERAL at 20:44

## 2019-07-23 RX ADMIN — SENNOSIDES, DOCUSATE SODIUM 2 TABLET: 50; 8.6 TABLET, FILM COATED ORAL at 04:44

## 2019-07-23 ASSESSMENT — ENCOUNTER SYMPTOMS
CARDIOVASCULAR NEGATIVE: 1
COUGH: 0
SPEECH CHANGE: 0
EYES NEGATIVE: 1
VOMITING: 0
CONSTITUTIONAL NEGATIVE: 1
BLURRED VISION: 0
SORE THROAT: 0
ABDOMINAL PAIN: 0
PALPITATIONS: 0
BRUISES/BLEEDS EASILY: 0
MYALGIAS: 0
NAUSEA: 0
DEPRESSION: 0
GASTROINTESTINAL NEGATIVE: 1
SHORTNESS OF BREATH: 0
CHILLS: 0
FEVER: 0
FOCAL WEAKNESS: 1
RESPIRATORY NEGATIVE: 1
HEADACHES: 0
DIAPHORESIS: 0
WEAKNESS: 0
MUSCULOSKELETAL NEGATIVE: 1
PSYCHIATRIC NEGATIVE: 1
DIZZINESS: 0
WEIGHT LOSS: 0

## 2019-07-23 ASSESSMENT — LIFESTYLE VARIABLES
SUBSTANCE_ABUSE: 0
ALCOHOL_USE: NO

## 2019-07-23 NOTE — PROGRESS NOTES
Hospital Medicine Daily Progress Note    Date of Service  2019    Chief Complaint  39 y.o. male admitted 2019 with right sided weakness    Hospital Course        Patient is a 39 y.o. male who presented 2019 with past medical history of asthma, bipolar, depression, hypothyroidism, seizure disorder who presents with right-sided hemiparesis.  This patient lives in a group home.  Apparently woke up with right arm and right leg hemiparesis.  He has similar presentations in the past that were consistent with possible Santosh's paralysis versus conversion disorder.    Interval Problem Update  Speech consistently fluent today, he reports ongoing right sided weakness but his exam with inconsistent.  I asked him to sit up and assisted him, initially he used only his left arm to pull himself up, but as I held his right hand at the same time, as he repositioned himself he pulled equally with the right as well before letting it go limp again.    He denies pain, denies dizziness.  He admits that he is very depressed he said his father  of a heroin overdose last month and that they were not close but it was overwhelming for him.  He agrees to speak with psychiatry.    axox3   ROS otherwise negative    Consultants/Specialty  Neurology  Psychiatry    Code Status  full    Disposition  tbd    Review of Systems  Review of Systems   Constitutional: Negative.  Negative for chills, diaphoresis, fever, malaise/fatigue and weight loss.   HENT: Negative.  Negative for sore throat.    Eyes: Negative.  Negative for blurred vision.   Respiratory: Negative.  Negative for cough and shortness of breath.    Cardiovascular: Negative.  Negative for chest pain, palpitations and leg swelling.   Gastrointestinal: Negative.  Negative for abdominal pain, nausea and vomiting.   Genitourinary: Negative.  Negative for dysuria.   Musculoskeletal: Negative.  Negative for myalgias.   Skin: Negative.  Negative for itching and rash.    Neurological: Positive for focal weakness (right sided, inconsistent). Negative for dizziness, speech change, weakness and headaches.   Endo/Heme/Allergies: Negative.  Does not bruise/bleed easily.   Psychiatric/Behavioral: Negative.  Negative for depression, substance abuse and suicidal ideas.   All other systems reviewed and are negative.       Physical Exam  Temp:  [36.1 °C (96.9 °F)-36.4 °C (97.6 °F)] 36.4 °C (97.6 °F)  Pulse:  [61-84] 66  Resp:  [16-18] 18  BP: (105-119)/(61-76) 106/72  SpO2:  [93 %-94 %] 94 %    Physical Exam   Constitutional: He is oriented to person, place, and time. He appears well-developed and well-nourished. No distress.   HENT:   Head: Normocephalic and atraumatic.   Right Ear: External ear normal.   Left Ear: External ear normal.   Nose: Nose normal.   Mouth/Throat: Oropharynx is clear and moist. No oropharyngeal exudate.   Eyes: Pupils are equal, round, and reactive to light. Conjunctivae and EOM are normal. Right eye exhibits no discharge. Left eye exhibits no discharge. No scleral icterus.   Neck: Normal range of motion. Neck supple. No JVD present. No tracheal deviation present. No thyromegaly present.   Suspected goiter- reportedly chronic - outpatient f/u recommended   Cardiovascular: Normal rate, regular rhythm, normal heart sounds and intact distal pulses.  Exam reveals no gallop and no friction rub.    No murmur heard.  Pulmonary/Chest: Effort normal and breath sounds normal. No stridor. No respiratory distress. He has no wheezes. He has no rales. He exhibits no tenderness.   Abdominal: Soft. Bowel sounds are normal. He exhibits no distension and no mass. There is no tenderness. There is no rebound and no guarding.   Musculoskeletal: Normal range of motion. He exhibits no edema, tenderness or deformity.   Lymphadenopathy:     He has no cervical adenopathy.   Neurological: He is alert and oriented to person, place, and time. He has normal reflexes. No cranial nerve deficit.  He exhibits normal muscle tone. Coordination normal.   Skin: Skin is warm and dry. No rash noted. He is not diaphoretic. No erythema. No pallor.   Diffuse milbiloform rash, left arm and face spared, blanching   Psychiatric: He has a normal mood and affect. His behavior is normal. Judgment and thought content normal.   Nursing note and vitals reviewed.      Fluids    Intake/Output Summary (Last 24 hours) at 07/23/19 1110  Last data filed at 07/23/19 0800   Gross per 24 hour   Intake              440 ml   Output             1700 ml   Net            -1260 ml       Laboratory  Recent Labs      07/21/19   1739   WBC  11.4*   RBC  4.18*   HEMOGLOBIN  12.8*   HEMATOCRIT  38.4*   MCV  91.9   MCH  30.6   MCHC  33.3*   RDW  42.5   PLATELETCT  292   MPV  9.5     Recent Labs      07/21/19   1739   SODIUM  134*   POTASSIUM  4.1   CHLORIDE  99   CO2  24   GLUCOSE  251*   BUN  22   CREATININE  0.90   CALCIUM  9.4     Recent Labs      07/21/19   1739   APTT  28.4   INR  0.93               Imaging  MR-BRAIN-W/O   Final Result      1.  Diffuse abnormal supratentorial white matter T2 hyperintensity. This is unchanged since the previous MRI dated 10/3/2012. This finding likely represents chronic white matter injury which can be secondary to the chronic sequela of toxic, metabolic,    microvascular, infectious ,inflammatory, demyelinating or dysmyelinating etiologies.   2.  Moderate cerebral atrophy.   3.  No acute abnormality.   4.  Stable cystlike structure abutting left anterior globe .      DX-CHEST-PORTABLE (1 VIEW)   Final Result      Hypoaeration changes without acute abnormality.      CT-CTA HEAD WITH & W/O-POST PROCESS   Final Result      No significant abnormality of the intracranial arteries.      CT-CTA NECK WITH & W/O-POST PROCESSING   Final Result      No significant abnormality of the neck arteries.      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      CT-HEAD W/O   Final Result       1.  No CT evidence of acute infarct, hemorrhage or mass.   2.  Unchanged confluent areas of white matter hypoattenuation, suggestive of chronic small vessel ischemic changes, demyelination or gliosis.      CT-CEREBRAL PERFUSION ANALYSIS   Final Result      1.  Cerebral blood flow less than 30% likely representing completed infarct = 0 mL.      2.  T Max more than 6 seconds likely representing combination of completed infarct and ischemia = 0 mL.      3.  Mismatched volume likely representing ischemic brain/penumbra = None      4.  Please note that the cerebral perfusion was performed on the limited brain tissue around the basal ganglia region. Infarct/ischemia outside the CT perfusion sections can be missed in this study.      EC-ECHOCARDIOGRAM COMPLETE W/O CONT    (Results Pending)         Assessment/Plan  * Acute right-sided weakness- (present on admission)   Assessment & Plan    This patient has hx of conversion and todds paralysis   CTA in ER wnl  Inconsistent  Continue Asa, statin   MRI brain shows no acute findings  Echocardiogram results pending  Seen by neuro who has signed off - suspect functional weakness - psych eval recommended and requested     Leukocytosis- (present on admission)   Assessment & Plan    Suspect reactive  Repeat pending     Rash   Assessment & Plan    Reportedly present for several months, denies recent abx  Improving  Continue to follow     HLD (hyperlipidemia)- (present on admission)   Assessment & Plan    Continue statin therapy      Type 2 diabetes mellitus without complication, without long-term current use of insulin (HCC)- (present on admission)   Assessment & Plan    Home medications held  Continue SSI  accu checks  Following     PTSD (post-traumatic stress disorder)- (present on admission)   Assessment & Plan    Hx of resume home buspar, zoflot     Seizure (HCC)- (present on admission)   Assessment & Plan    EEG   Continue depakote   Seizure precautions      Anemia- (present  on admission)   Assessment & Plan    Mild, no evidence of bleeding        Asthma- (present on admission)   Assessment & Plan    No evidence of acute exacerbation   Continue singulair  Continue resp care protocol      Obesity- (present on admission)   Assessment & Plan    Encouraged weight loss     Acquired hypothyroidism- (present on admission)   Assessment & Plan    Continue levothyroxine   Tsh elevated but decreased in comparison to march levels            VTE prophylaxis: scd

## 2019-07-23 NOTE — PROGRESS NOTES
Received care of pt from NOC RN this am. Pt is A+O X 4 with prompts. No c/o. respositions in bed with minimal assist today.

## 2019-07-23 NOTE — CARE PLAN
Problem: Safety  Goal: Will remain free from injury  Outcome: PROGRESSING AS EXPECTED  Bed alarm on, bed locked and in low position, call light within reach. Patient turns self side to side, assisting with turns as needed. Patient has waffle overlay in use. Hourly rounding implemented.     Problem: Venous Thromboembolism (VTW)/Deep Vein Thrombosis (DVT) Prevention:  Goal: Patient will participate in Venous Thrombosis (VTE)/Deep Vein Thrombosis (DVT)Prevention Measures  Outcome: PROGRESSING AS EXPECTED  Patient has SCDs in use per MD. Discussed ROM with patient.     Problem: Pain Management  Goal: Pain level will decrease to patient's comfort goal  Outcome: PROGRESSING AS EXPECTED  Patient denied pain throughout shift.

## 2019-07-23 NOTE — THERAPY
"Speech Language Therapy dysphagia treatment completed.   Functional Status:  Patient seen for swallowing therapy this date. Patient awake, alert and oriented in all spheres.  Speech remains slow but fluent.  Patient reporting that his right side still is not working.  Patient seen for lunch meal today.  He was able to self feed with left hand.  He was able to consume dysphagia II textures, trials of dysphagia III textures and thin liquids without any overt S/Sx of aspiration noted.  He did have anterior loss of bolus X1 on left and X2 on right, but in all instances appeared to feel it and picked up the foods dropped and placed them back in his mouth. He was able to consume all textures without any overt S/Sx of aspiration. Discussed potential diet upgrade to dysphagia III/thins, but patient still requesting dysphagia II as it is easier to eat with 1 hand and that he wants to be very cautious as his speech is still not back to baseline.  For now, would recommend continuation of dysphagia II with thin liquids.    Recommendations - Diet:  Dysphagia II diet (per patient request) with thin liquids                          Strategies: Monitor during meals, Assistance needed for meal tray set-up and Head of Bed at 90 Degrees                          Medication Administration:  as tolerated   Plan of Care: Will benefit from Speech Therapy 3 times per week    Post-Acute Therapy: to be determined depending on status and progress--do not anticipate patient will need dysphagia therapy following DC from acute care setting.     See \"Rehab Therapy-Acute\" Patient Summary Report for complete documentation    "

## 2019-07-23 NOTE — PROGRESS NOTES
Monitor summary: SR-SB 50- 87, NE .2, QRS .12, QT .44 with rare PVCs per strip from monitor room.

## 2019-07-23 NOTE — CARE PLAN
Problem: Safety  Goal: Will remain free from injury  Outcome: PROGRESSING AS EXPECTED  Bed alarm on, bed locked & in lowest position, call light in reach    Problem: Venous Thromboembolism (VTW)/Deep Vein Thrombosis (DVT) Prevention:  Goal: Patient will participate in Venous Thrombosis (VTE)/Deep Vein Thrombosis (DVT)Prevention Measures  Outcome: PROGRESSING AS EXPECTED  SCDs in place & on, pt tolerating well
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
## 2019-07-24 ENCOUNTER — HOME HEALTH ADMISSION (OUTPATIENT)
Dept: HOME HEALTH SERVICES | Facility: HOME HEALTHCARE | Age: 39
End: 2019-07-24
Payer: MEDICAID

## 2019-07-24 PROBLEM — D72.829 LEUKOCYTOSIS: Status: RESOLVED | Noted: 2018-08-19 | Resolved: 2019-07-24

## 2019-07-24 LAB
ALBUMIN SERPL BCP-MCNC: 3.9 G/DL (ref 3.2–4.9)
ALBUMIN/GLOB SERPL: 1.6 G/DL
ALP SERPL-CCNC: 56 U/L (ref 30–99)
ALT SERPL-CCNC: 10 U/L (ref 2–50)
ANION GAP SERPL CALC-SCNC: 10 MMOL/L (ref 0–11.9)
AST SERPL-CCNC: 11 U/L (ref 12–45)
BASOPHILS # BLD AUTO: 0.8 % (ref 0–1.8)
BASOPHILS # BLD: 0.08 K/UL (ref 0–0.12)
BILIRUB SERPL-MCNC: 0.4 MG/DL (ref 0.1–1.5)
BUN SERPL-MCNC: 16 MG/DL (ref 8–22)
CALCIUM SERPL-MCNC: 8.8 MG/DL (ref 8.5–10.5)
CHLORIDE SERPL-SCNC: 100 MMOL/L (ref 96–112)
CHOLEST SERPL-MCNC: 171 MG/DL (ref 100–199)
CO2 SERPL-SCNC: 25 MMOL/L (ref 20–33)
CREAT SERPL-MCNC: 0.91 MG/DL (ref 0.5–1.4)
EOSINOPHIL # BLD AUTO: 0.21 K/UL (ref 0–0.51)
EOSINOPHIL NFR BLD: 2.2 % (ref 0–6.9)
ERYTHROCYTE [DISTWIDTH] IN BLOOD BY AUTOMATED COUNT: 43.7 FL (ref 35.9–50)
GLOBULIN SER CALC-MCNC: 2.4 G/DL (ref 1.9–3.5)
GLUCOSE BLD-MCNC: 306 MG/DL (ref 65–99)
GLUCOSE BLD-MCNC: 331 MG/DL (ref 65–99)
GLUCOSE BLD-MCNC: 358 MG/DL (ref 65–99)
GLUCOSE BLD-MCNC: 375 MG/DL (ref 65–99)
GLUCOSE SERPL-MCNC: 273 MG/DL (ref 65–99)
HCT VFR BLD AUTO: 39.5 % (ref 42–52)
HDLC SERPL-MCNC: 29 MG/DL
HGB BLD-MCNC: 12.9 G/DL (ref 14–18)
IMM GRANULOCYTES # BLD AUTO: 0.18 K/UL (ref 0–0.11)
IMM GRANULOCYTES NFR BLD AUTO: 1.9 % (ref 0–0.9)
LDLC SERPL CALC-MCNC: ABNORMAL MG/DL
LYMPHOCYTES # BLD AUTO: 3.17 K/UL (ref 1–4.8)
LYMPHOCYTES NFR BLD: 33.4 % (ref 22–41)
MCH RBC QN AUTO: 30.4 PG (ref 27–33)
MCHC RBC AUTO-ENTMCNC: 32.7 G/DL (ref 33.7–35.3)
MCV RBC AUTO: 92.9 FL (ref 81.4–97.8)
MONOCYTES # BLD AUTO: 0.94 K/UL (ref 0–0.85)
MONOCYTES NFR BLD AUTO: 9.9 % (ref 0–13.4)
NEUTROPHILS # BLD AUTO: 4.92 K/UL (ref 1.82–7.42)
NEUTROPHILS NFR BLD: 51.8 % (ref 44–72)
NRBC # BLD AUTO: 0 K/UL
NRBC BLD-RTO: 0 /100 WBC
PLATELET # BLD AUTO: 276 K/UL (ref 164–446)
PMV BLD AUTO: 9.6 FL (ref 9–12.9)
POTASSIUM SERPL-SCNC: 3.8 MMOL/L (ref 3.6–5.5)
PROT SERPL-MCNC: 6.3 G/DL (ref 6–8.2)
RBC # BLD AUTO: 4.25 M/UL (ref 4.7–6.1)
SODIUM SERPL-SCNC: 135 MMOL/L (ref 135–145)
TRIGL SERPL-MCNC: 597 MG/DL (ref 0–149)
WBC # BLD AUTO: 9.5 K/UL (ref 4.8–10.8)

## 2019-07-24 PROCEDURE — 85025 COMPLETE CBC W/AUTO DIFF WBC: CPT

## 2019-07-24 PROCEDURE — 97530 THERAPEUTIC ACTIVITIES: CPT | Mod: XU

## 2019-07-24 PROCEDURE — A9270 NON-COVERED ITEM OR SERVICE: HCPCS | Performed by: HOSPITALIST

## 2019-07-24 PROCEDURE — 99215 OFFICE O/P EST HI 40 MIN: CPT | Performed by: PSYCHIATRY & NEUROLOGY

## 2019-07-24 PROCEDURE — 700102 HCHG RX REV CODE 250 W/ 637 OVERRIDE(OP): Performed by: HOSPITALIST

## 2019-07-24 PROCEDURE — 99225 PR SUBSEQUENT OBSERVATION CARE,LEVEL II: CPT | Performed by: HOSPITALIST

## 2019-07-24 PROCEDURE — 97110 THERAPEUTIC EXERCISES: CPT

## 2019-07-24 PROCEDURE — G0378 HOSPITAL OBSERVATION PER HR: HCPCS

## 2019-07-24 PROCEDURE — 80053 COMPREHEN METABOLIC PANEL: CPT

## 2019-07-24 PROCEDURE — 97140 MANUAL THERAPY 1/> REGIONS: CPT

## 2019-07-24 PROCEDURE — 36415 COLL VENOUS BLD VENIPUNCTURE: CPT

## 2019-07-24 PROCEDURE — 97535 SELF CARE MNGMENT TRAINING: CPT | Mod: XU

## 2019-07-24 PROCEDURE — A9270 NON-COVERED ITEM OR SERVICE: HCPCS | Performed by: PSYCHIATRY & NEUROLOGY

## 2019-07-24 PROCEDURE — 82962 GLUCOSE BLOOD TEST: CPT | Mod: 91

## 2019-07-24 PROCEDURE — 700102 HCHG RX REV CODE 250 W/ 637 OVERRIDE(OP): Performed by: PSYCHIATRY & NEUROLOGY

## 2019-07-24 PROCEDURE — 96372 THER/PROPH/DIAG INJ SC/IM: CPT

## 2019-07-24 PROCEDURE — 80061 LIPID PANEL: CPT

## 2019-07-24 RX ADMIN — LEVOTHYROXINE SODIUM 150 MCG: 150 TABLET ORAL at 05:37

## 2019-07-24 RX ADMIN — INSULIN HUMAN 4 UNITS: 100 INJECTION, SOLUTION PARENTERAL at 17:11

## 2019-07-24 RX ADMIN — INSULIN HUMAN 5 UNITS: 100 INJECTION, SOLUTION PARENTERAL at 22:43

## 2019-07-24 RX ADMIN — ASPIRIN 325 MG: 325 TABLET, FILM COATED ORAL at 05:37

## 2019-07-24 RX ADMIN — SERTRALINE HYDROCHLORIDE 150 MG: 100 TABLET ORAL at 05:37

## 2019-07-24 RX ADMIN — SENNOSIDES, DOCUSATE SODIUM 2 TABLET: 50; 8.6 TABLET, FILM COATED ORAL at 17:04

## 2019-07-24 RX ADMIN — DIVALPROEX SODIUM 500 MG: 500 TABLET, DELAYED RELEASE ORAL at 05:37

## 2019-07-24 RX ADMIN — PRAZOSIN HYDROCHLORIDE 2 MG: 2 CAPSULE ORAL at 17:04

## 2019-07-24 RX ADMIN — ATORVASTATIN CALCIUM 80 MG: 80 TABLET, FILM COATED ORAL at 22:43

## 2019-07-24 RX ADMIN — BUSPIRONE HYDROCHLORIDE 10 MG: 10 TABLET ORAL at 17:04

## 2019-07-24 RX ADMIN — BUSPIRONE HYDROCHLORIDE 10 MG: 10 TABLET ORAL at 05:37

## 2019-07-24 RX ADMIN — FAMOTIDINE 20 MG: 20 TABLET ORAL at 17:04

## 2019-07-24 RX ADMIN — INSULIN HUMAN 4 UNITS: 100 INJECTION, SOLUTION PARENTERAL at 08:34

## 2019-07-24 RX ADMIN — FAMOTIDINE 20 MG: 20 TABLET ORAL at 05:37

## 2019-07-24 RX ADMIN — INSULIN HUMAN 5 UNITS: 100 INJECTION, SOLUTION PARENTERAL at 11:44

## 2019-07-24 RX ADMIN — DIVALPROEX SODIUM 500 MG: 500 TABLET, DELAYED RELEASE ORAL at 17:04

## 2019-07-24 RX ADMIN — METFORMIN HYDROCHLORIDE 500 MG: 500 TABLET, FILM COATED ORAL at 17:04

## 2019-07-24 ASSESSMENT — ENCOUNTER SYMPTOMS
GASTROINTESTINAL NEGATIVE: 1
DEPRESSION: 0
ABDOMINAL PAIN: 0
DIAPHORESIS: 0
VOMITING: 0
SHORTNESS OF BREATH: 0
HEADACHES: 0
MYALGIAS: 0
RESPIRATORY NEGATIVE: 1
SORE THROAT: 0
EYES NEGATIVE: 1
MUSCULOSKELETAL NEGATIVE: 1
CARDIOVASCULAR NEGATIVE: 1
CHILLS: 0
CONSTITUTIONAL NEGATIVE: 1
WEAKNESS: 0
SPEECH CHANGE: 0
BRUISES/BLEEDS EASILY: 0
PSYCHIATRIC NEGATIVE: 1
DIZZINESS: 0
NAUSEA: 0
COUGH: 0
FEVER: 0
PALPITATIONS: 0
WEIGHT LOSS: 0
BLURRED VISION: 0
FOCAL WEAKNESS: 1

## 2019-07-24 ASSESSMENT — COGNITIVE AND FUNCTIONAL STATUS - GENERAL
TURNING FROM BACK TO SIDE WHILE IN FLAT BAD: A LITTLE
STANDING UP FROM CHAIR USING ARMS: A LITTLE
DRESSING REGULAR UPPER BODY CLOTHING: A LOT
WALKING IN HOSPITAL ROOM: A LOT
EATING MEALS: A LITTLE
SUGGESTED CMS G CODE MODIFIER MOBILITY: CK
SUGGESTED CMS G CODE MODIFIER DAILY ACTIVITY: CK
MOVING TO AND FROM BED TO CHAIR: A LITTLE
DAILY ACTIVITIY SCORE: 14
CLIMB 3 TO 5 STEPS WITH RAILING: A LOT
TOILETING: A LOT
PERSONAL GROOMING: A LITTLE
HELP NEEDED FOR BATHING: A LOT
MOBILITY SCORE: 16
MOVING FROM LYING ON BACK TO SITTING ON SIDE OF FLAT BED: A LITTLE
DRESSING REGULAR LOWER BODY CLOTHING: A LOT

## 2019-07-24 ASSESSMENT — LIFESTYLE VARIABLES: SUBSTANCE_ABUSE: 0

## 2019-07-24 ASSESSMENT — GAIT ASSESSMENTS
ASSISTIVE DEVICE: FRONT WHEEL WALKER
GAIT LEVEL OF ASSIST: UNABLE TO PARTICIPATE

## 2019-07-24 NOTE — DISCHARGE PLANNING
Received Choice form at 7448  Agency/Facility Name: [facility]  Referral sent per Choice form @ 1952

## 2019-07-24 NOTE — DISCHARGE PLANNING
Follow up for rehab reached out to [facility] living/Independent living to discuss level of care they are able to provide in anticipation of returning to their program.  Monalisa   and she would like to do an onsite visit 07/25/2019 between 2 and 3 pm Sulaiman or Nabil will do evaluation Monalisa contact information phone number [phone] and ask for her. Ayaz GRIFFIN.

## 2019-07-24 NOTE — THERAPY
"Physical Therapy Treatment completed.   Bed Mobility:  Supine to Sit: Stand by Assist (cues for tech, used LUE/LE to move RUE/LE)  Transfers: Sit to Stand: Contact Guard Assist (x2 persons with FWW for safety; x4 reps)  Gait: Level Of Assist: Unable to Participate   Plan of Care: Will benefit from Physical Therapy 4 times per week  Discharge Recommendations: Equipment: Will Continue to Assess for Equipment Needs. Post-acute therapy Discharge to a transitional care facility for continued skilled therapy services.     Patient with improving mobility. He demonstrated fluctating RLE/UE strength during session today. He was able to perform 4 sit<>stands today with CGA x2 persons with FWW. Patient able to maintain RUE on FWW without assist. Able to maintain static stand for 1-2 minutes with each rep with weight shift practice and attempts at heel lifts. Patient not able to lift L or R heel. No R knee block required to maintain static stand.  Patient required AAROM for all RLE seated exercises. Performed seated manual R calf/hamstring stretch x30 sec, after letting go of patient's ankle, patient maintained R ankle in dorsiflexed position for ~30 sec. Will continue to follow while in acute care. D/c pending progress, at this time recommend post acute services.      See \"Rehab Therapy-Acute\" Patient Summary Report for complete documentation.       "

## 2019-07-24 NOTE — DISCHARGE PLANNING
ATTN: Case Management  RE: Referral for Home Health    As of 7/24/2019, we have accepted the Home Health referral for the patient listed above.    A [facility] clinician will be out to see the patient within 48 hours. If you have any questions or concerns regarding the patient’s transition to Home Health, please do not hesitate to contact us at [phone].      We look forward to collaborating with you,  [facility] Health Team

## 2019-07-24 NOTE — FACE TO FACE
Face to Face Supporting Documentation - Home Health    The encounter with this patient was in whole or in part the primary reason for home health admission.    Date of encounter:   Patient:                    MRN:                       YOB: 2017  Norm Prabhakar  [id number]  1980     Home health to see patient for:  Skilled Nursing care for assessment, interventions & education    Skilled need for:  Comment: exam, pt, depression, weakness    Skilled nursing interventions to include:  Comment: exam, pt    Homebound status evidenced by:  Needs the assistance of another person in order to leave the home. Leaving home requires a considerable and taxing effort. There is a normal inability to leave the home.    Community Physician to provide follow up care: ANIKET Chung     Optional Interventions? No      I certify the face to face encounter for this home health care referral meets the CMS requirements and the encounter/clinical assessment with the patient was, in whole, or in part, for the medical condition(s) listed above, which is the primary reason for home health care. Based on my clinical findings: the service(s) are medically necessary, support the need for home health care, and the homebound criteria are met.  I certify that this patient has had a face to face encounter by myself.  Aniya Hoyos M.D. - NPI: [id number]

## 2019-07-24 NOTE — PROGRESS NOTES
[facility] Medicine Daily Progress Note    Date of Service  7/25/2019    Chief Complaint  39 y.o. male admitted 7/21/2019 with right sided weakness    Hospital Course        Patient is a 39 y.o. male who presented 7/21/2019 with past medical history of asthma, bipolar, depression, hypothyroidism, seizure disorder who presents with right-sided hemiparesis.  This patient lives in a group home.  Apparently woke up with right arm and right leg hemiparesis.  He has similar presentations in the past that were consistent with possible Santosh's paralysis versus conversion disorder.    Interval Problem Update  Speech normal  Pt denies pain  Reports ongoing right sided weakness  During exam I offered assistance helping him sit - he used only left side for first 5 attempts on the 6th he used both right and left equally to sit up - I congratulated him and immediately his right arm when limp again - clear functional component  Ros otherwise negative    Consultants/Specialty  Neurology  Psychiatry    Code Status  full    Disposition  tbd    Review of Systems  Review of Systems   Constitutional: Negative.  Negative for chills, diaphoresis, fever, malaise/fatigue and weight loss.   HENT: Negative.  Negative for sore throat.    Eyes: Negative.  Negative for blurred vision.   Respiratory: Negative.  Negative for cough and shortness of breath.    Cardiovascular: Negative.  Negative for chest pain, palpitations and leg swelling.   Gastrointestinal: Negative.  Negative for abdominal pain, nausea and vomiting.   Genitourinary: Negative.  Negative for dysuria.   Musculoskeletal: Negative.  Negative for myalgias.   Skin: Negative.  Negative for itching and rash.   Neurological: Positive for focal weakness (right sided, inconsistent). Negative for dizziness, speech change, weakness and headaches.   Endo/Heme/Allergies: Negative.  Does not bruise/bleed easily.   Psychiatric/Behavioral: Negative.  Negative for depression, substance abuse and  suicidal ideas.   All other systems reviewed and are negative.       Physical Exam  Temp:  [36.1 °C (97 °F)-37.1 °C (98.8 °F)] 36.1 °C (97 °F)  Pulse:  [61-87] 61  Resp:  [16-18] 17  BP: (105-125)/(63-74) 105/63  SpO2:  [92 %-100 %] 95 %    Physical Exam   Constitutional: He is oriented to person, place, and time. He appears well-developed and well-nourished. No distress.   HENT:   Head: Normocephalic and atraumatic.   Right Ear: External ear normal.   Left Ear: External ear normal.   Nose: Nose normal.   Mouth/Throat: Oropharynx is clear and moist. No oropharyngeal exudate.   Eyes: Pupils are equal, round, and reactive to light. Conjunctivae and EOM are normal. Right eye exhibits no discharge. Left eye exhibits no discharge. No scleral icterus.   Neck: Normal range of motion. Neck supple. No JVD present. No tracheal deviation present. No thyromegaly present.   Suspected goiter- reportedly chronic - outpatient f/u recommended   Cardiovascular: Normal rate, regular rhythm, normal heart sounds and intact distal pulses.  Exam reveals no gallop and no friction rub.    No murmur heard.  Pulmonary/Chest: Effort normal and breath sounds normal. No stridor. No respiratory distress. He has no wheezes. He has no rales. He exhibits no tenderness.   Abdominal: Soft. Bowel sounds are normal. He exhibits no distension and no mass. There is no tenderness. There is no rebound and no guarding.   Musculoskeletal: Normal range of motion. He exhibits no edema, tenderness or deformity.   Lymphadenopathy:     He has no cervical adenopathy.   Neurological: He is alert and oriented to person, place, and time. He has normal reflexes. No cranial nerve deficit. He exhibits normal muscle tone. Coordination normal.   Skin: Skin is warm and dry. No rash noted. He is not diaphoretic. No erythema. No pallor.   Diffuse milbiloform rash, left arm and face spared, blanching continues to improve   Psychiatric: He has a normal mood and affect. His  behavior is normal. Judgment and thought content normal.   Nursing note and vitals reviewed.      Fluids    Intake/Output Summary (Last 24 hours) at 07/25/19 1521  Last data filed at 07/24/19 2200   Gross per 24 hour   Intake                0 ml   Output             1300 ml   Net            -1300 ml       Laboratory  Recent Labs      07/24/19   0318   WBC  9.5   RBC  4.25*   HEMOGLOBIN  12.9*   HEMATOCRIT  39.5*   MCV  92.9   MCH  30.4   MCHC  32.7*   RDW  43.7   PLATELETCT  276   MPV  9.6     Recent Labs      07/24/19   0318   SODIUM  135   POTASSIUM  3.8   CHLORIDE  100   CO2  25   GLUCOSE  273*   BUN  16   CREATININE  0.91   CALCIUM  8.8             Recent Labs      07/24/19 0318   TRIGLYCERIDE  597*   HDL  29*   LDL  see below       Imaging  EC-ECHOCARDIOGRAM COMPLETE W/O CONT   Final Result      MR-BRAIN-W/O   Final Result      1.  Diffuse abnormal supratentorial white matter T2 hyperintensity. This is unchanged since the previous MRI dated 10/3/2012. This finding likely represents chronic white matter injury which can be secondary to the chronic sequela of toxic, metabolic,    microvascular, infectious ,inflammatory, demyelinating or dysmyelinating etiologies.   2.  Moderate cerebral atrophy.   3.  No acute abnormality.   4.  Stable cystlike structure abutting left anterior globe .      DX-CHEST-PORTABLE (1 VIEW)   Final Result      Hypoaeration changes without acute abnormality.      CT-CTA HEAD WITH & W/O-POST PROCESS   Final Result      No significant abnormality of the intracranial arteries.      CT-CTA NECK WITH & W/O-POST PROCESSING   Final Result      No significant abnormality of the neck arteries.      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      CT-HEAD W/O   Final Result      1.  No CT evidence of acute infarct, hemorrhage or mass.   2.  Unchanged confluent areas of white matter hypoattenuation, suggestive of chronic small vessel ischemic changes,  demyelination or gliosis.      CT-CEREBRAL PERFUSION ANALYSIS   Final Result      1.  Cerebral blood flow less than 30% likely representing completed infarct = 0 mL.      2.  T Max more than 6 seconds likely representing combination of completed infarct and ischemia = 0 mL.      3.  Mismatched volume likely representing ischemic brain/penumbra = None      4.  Please note that the cerebral perfusion was performed on the limited brain tissue around the basal ganglia region. Infarct/ischemia outside the CT perfusion sections can be missed in this study.            Assessment/Plan  * Acute right-sided weakness- (present on admission)   Assessment & Plan    This patient has hx of conversion   Not consistent with Santosh's paralysis  Functional component  CTA in ER wnl  PT recommended snf but not a candidate as observation and weakness resolved this am  Continue Asa, statin   MRI brain shows no acute findings  Echocardiogram wnl  Seen by neuro who has signed off - suspect functional weakness - psych eval recommended and requested  Group home to eval pending to see if they can take him back     Rash   Assessment & Plan    Reportedly present for several months  Suspect due to recent tetracycline  Improving  Continue to follow     HLD (hyperlipidemia)- (present on admission)   Assessment & Plan    Continue statin therapy      Type 2 diabetes mellitus without complication, without long-term current use of insulin (HCC)- (present on admission)   Assessment & Plan    Ongoing hyperglycemia, per nursing, he ate a large bag of M&M's last night  Dietary compliance discussed and recommended  Continue metformin  Continue SSI  accu checks  Following     PTSD (post-traumatic stress disorder)- (present on admission)   Assessment & Plan    Continue buspar  Dose of zoloft increased by psych     Seizure (HCC)- (present on admission)   Assessment & Plan    No active seizure activity  Continue depakote   Seizure precautions      Anemia-  (present on admission)   Assessment & Plan    Mild, no evidence of bleeding        Asthma- (present on admission)   Assessment & Plan    No evidence of acute exacerbation   Continue singulair  Continue resp care protocol      Obesity- (present on admission)   Assessment & Plan    Encouraged weight loss     Acquired hypothyroidism- (present on admission)   Assessment & Plan    Continue levothyroxine   Tsh elevated but decreased in comparison to march levels  Likely contributing to his depression  Recommend repeat thyroid function tests in 4 weeks            VTE prophylaxis: scd

## 2019-07-24 NOTE — CARE PLAN
Problem: Safety  Goal: Will remain free from injury  Outcome: PROGRESSING AS EXPECTED  Bed alarm on, bed locked & in lowest position, call light an belongings in reach, using call light appropriately    Problem: Skin Integrity  Goal: Risk for impaired skin integrity will decrease  Outcome: PROGRESSING AS EXPECTED  Encourage pt to be compliant with diabetic diet and to turn self frequently, pt stated understanding

## 2019-07-24 NOTE — PROGRESS NOTES
Tele: SR 65 to 80.   .20 / .10 / .36    Pt denies c/o pain. Given some assist with repositioning today. Enc mobilization in bed. Will pass care to NOC RN @ [facility]

## 2019-07-24 NOTE — DISCHARGE PLANNING
Case discussed with physiatry there is no definitive diagnosis to support IRF level of care. Documentation does  not support significant and practical improvement within the limit of IRF level of care. Likely not able to obtain insurance authorization for IRF level of care. No physiatry consult ordered per protocol.
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
## 2019-07-24 NOTE — THERAPY
"Occupational Therapy Treatment completed with focus on ADLs, patient education, cognition and upper extremity function.  Functional Status:  Pt was seen for Occupational Therapy treatment today, see Therapy Kardex for details.Treatment included education in breath control with activity and at rest, self pacing techs and energy conservation for pain management. Educated pt in safety awareness techs as well. Psychosocial intervention addressed. Pt required Mod A for supine to sit at EOB. Pt unable to lift or move RLE to assist. Pt demo improved static sitting balance staying upright at midline. Pt demo fair dynamic sitting balance attempting sponge bathing seated EOB with Mod A and verbal cues for thoroughness. Washed hair using a shampoo cap required Min A with extended time. Combed hair with set up only. Declined to do oral hygiene due to \"a loose tooth, very painful which cannot tolerate any thing brushing against it or touching it.\" Informed pt he could do the rest of his mouth and teeth but pt stated,  \"Please, not now\". \"It is already bothering me\".Pt demonstrated Min A for UB dressing, did not want to attempt LB dressing due to \"the need for nguyen care done by nursing supine in bed and \"pants will not let me use the urinal without getting urine all over myself.\"  PROM done throughout RUE with gentle stretching and massage. Pt tolerated well stating,  \"My whole arm is numb and I can't feel anything and no pain\". Pt began to discuss his father's death a few months ago from heroin and his fiance who lives in [place]\".  Pt required Mod A for sit to supine for legs. Pt gave good effort and is motivated in therapy.  Pt was left up in bed , call light in reach and nursing is aware.  Continue Occupational Therapy services as per plan.    Plan of Care: Will benefit from Occupational Therapy 4 times per week  Discharge Recommendations:  Equipment Will Continue to Assess for Equipment Needs. Post-acute therapy:  Recommend " "post-acute placement for additional Occupational Therapy services prior to discharge home. Patient can tolerate post-acute therapies at a 5x/week frequency.       See \"Rehab Therapy-Acute\" Patient Summary Report for complete documentation.   "

## 2019-07-25 ENCOUNTER — PATIENT OUTREACH (OUTPATIENT)
Dept: HEALTH INFORMATION MANAGEMENT | Facility: OTHER | Age: 39
End: 2019-07-25

## 2019-07-25 LAB
GLUCOSE BLD-MCNC: 276 MG/DL (ref 65–99)
GLUCOSE BLD-MCNC: 290 MG/DL (ref 65–99)
GLUCOSE BLD-MCNC: 294 MG/DL (ref 65–99)
GLUCOSE BLD-MCNC: 299 MG/DL (ref 65–99)

## 2019-07-25 PROCEDURE — 92526 ORAL FUNCTION THERAPY: CPT

## 2019-07-25 PROCEDURE — A9270 NON-COVERED ITEM OR SERVICE: HCPCS | Performed by: PSYCHIATRY & NEUROLOGY

## 2019-07-25 PROCEDURE — 700102 HCHG RX REV CODE 250 W/ 637 OVERRIDE(OP): Performed by: PSYCHIATRY & NEUROLOGY

## 2019-07-25 PROCEDURE — A9270 NON-COVERED ITEM OR SERVICE: HCPCS | Performed by: HOSPITALIST

## 2019-07-25 PROCEDURE — 96372 THER/PROPH/DIAG INJ SC/IM: CPT

## 2019-07-25 PROCEDURE — 82962 GLUCOSE BLOOD TEST: CPT | Mod: 91

## 2019-07-25 PROCEDURE — 700102 HCHG RX REV CODE 250 W/ 637 OVERRIDE(OP): Performed by: HOSPITALIST

## 2019-07-25 PROCEDURE — 99225 PR SUBSEQUENT OBSERVATION CARE,LEVEL II: CPT | Performed by: HOSPITALIST

## 2019-07-25 PROCEDURE — G0378 HOSPITAL OBSERVATION PER HR: HCPCS

## 2019-07-25 RX ORDER — GLIMEPIRIDE 4 MG/1
4 TABLET ORAL EVERY MORNING
Status: DISCONTINUED | OUTPATIENT
Start: 2019-07-25 | End: 2019-07-28 | Stop reason: HOSPADM

## 2019-07-25 RX ADMIN — FAMOTIDINE 20 MG: 20 TABLET ORAL at 18:04

## 2019-07-25 RX ADMIN — INSULIN HUMAN 3 UNITS: 100 INJECTION, SOLUTION PARENTERAL at 12:00

## 2019-07-25 RX ADMIN — BUSPIRONE HYDROCHLORIDE 10 MG: 10 TABLET ORAL at 18:04

## 2019-07-25 RX ADMIN — METFORMIN HYDROCHLORIDE 500 MG: 500 TABLET, FILM COATED ORAL at 06:24

## 2019-07-25 RX ADMIN — GLIMEPIRIDE 4 MG: 4 TABLET ORAL at 13:06

## 2019-07-25 RX ADMIN — INSULIN HUMAN 3 UNITS: 100 INJECTION, SOLUTION PARENTERAL at 07:36

## 2019-07-25 RX ADMIN — INSULIN HUMAN 3 UNITS: 100 INJECTION, SOLUTION PARENTERAL at 18:01

## 2019-07-25 RX ADMIN — SERTRALINE HYDROCHLORIDE 150 MG: 100 TABLET ORAL at 06:24

## 2019-07-25 RX ADMIN — LEVOTHYROXINE SODIUM 150 MCG: 150 TABLET ORAL at 06:24

## 2019-07-25 RX ADMIN — DIVALPROEX SODIUM 500 MG: 500 TABLET, DELAYED RELEASE ORAL at 18:04

## 2019-07-25 RX ADMIN — PRAZOSIN HYDROCHLORIDE 2 MG: 2 CAPSULE ORAL at 18:04

## 2019-07-25 RX ADMIN — BUSPIRONE HYDROCHLORIDE 10 MG: 10 TABLET ORAL at 06:23

## 2019-07-25 RX ADMIN — ASPIRIN 325 MG: 325 TABLET, FILM COATED ORAL at 06:23

## 2019-07-25 RX ADMIN — METFORMIN HYDROCHLORIDE 500 MG: 500 TABLET, FILM COATED ORAL at 18:04

## 2019-07-25 RX ADMIN — DIVALPROEX SODIUM 500 MG: 500 TABLET, DELAYED RELEASE ORAL at 06:23

## 2019-07-25 RX ADMIN — ATORVASTATIN CALCIUM 80 MG: 80 TABLET, FILM COATED ORAL at 23:44

## 2019-07-25 RX ADMIN — FAMOTIDINE 20 MG: 20 TABLET ORAL at 06:24

## 2019-07-25 ASSESSMENT — ENCOUNTER SYMPTOMS
NAUSEA: 0
CONSTITUTIONAL NEGATIVE: 1
SHORTNESS OF BREATH: 0
ABDOMINAL PAIN: 0
MYALGIAS: 0
CHILLS: 0
SPEECH CHANGE: 0
EYES NEGATIVE: 1
DEPRESSION: 0
PSYCHIATRIC NEGATIVE: 1
FEVER: 0
RESPIRATORY NEGATIVE: 1
DIAPHORESIS: 0
SORE THROAT: 0
WEIGHT LOSS: 0
GASTROINTESTINAL NEGATIVE: 1
MUSCULOSKELETAL NEGATIVE: 1
COUGH: 0
HEADACHES: 0
VOMITING: 0
PALPITATIONS: 0
WEAKNESS: 0
FOCAL WEAKNESS: 0
BRUISES/BLEEDS EASILY: 0
CARDIOVASCULAR NEGATIVE: 1
DIZZINESS: 0
BLURRED VISION: 0

## 2019-07-25 ASSESSMENT — LIFESTYLE VARIABLES: SUBSTANCE_ABUSE: 0

## 2019-07-25 NOTE — PROGRESS NOTES
Hospital Medicine Daily Progress Note    Date of Service  7/25/2019    Chief Complaint  39 y.o. male admitted 7/21/2019 with right sided weakness    Hospital Course        Patient is a 39 y.o. male who presented 7/21/2019 with past medical history of asthma, bipolar, depression, hypothyroidism, seizure disorder who presents with right-sided hemiparesis.  This patient lives in a group home.  Apparently woke up with right arm and right leg hemiparesis.  He has similar presentations in the past that were consistent with possible Santosh's paralysis versus conversion disorder.    Interval Problem Update    Right sided weakness resolved this am, he denies pain  He is axox3, states he is ready to go back to his group home  No itching, ros otherwise negative    Consultants/Specialty  Neurology  Psychiatry    Code Status  full    Disposition  snf declined as he is observation, plan back to group home with home health and outpatient follow up when accepted    Review of Systems  Review of Systems   Constitutional: Negative.  Negative for chills, diaphoresis, fever, malaise/fatigue and weight loss.   HENT: Negative.  Negative for sore throat.    Eyes: Negative.  Negative for blurred vision.   Respiratory: Negative.  Negative for cough and shortness of breath.    Cardiovascular: Negative.  Negative for chest pain, palpitations and leg swelling.   Gastrointestinal: Negative.  Negative for abdominal pain, nausea and vomiting.   Genitourinary: Negative.  Negative for dysuria.   Musculoskeletal: Negative.  Negative for myalgias.   Skin: Negative.  Negative for itching and rash.   Neurological: Negative for dizziness, speech change, focal weakness, weakness and headaches.   Endo/Heme/Allergies: Negative.  Does not bruise/bleed easily.   Psychiatric/Behavioral: Negative.  Negative for depression, substance abuse and suicidal ideas.   All other systems reviewed and are negative.       Physical Exam  Temp:  [36.1 °C (97 °F)-37.1 °C (98.8  °F)] 36.1 °C (97 °F)  Pulse:  [61-87] 61  Resp:  [16-18] 17  BP: (105-125)/(63-74) 105/63  SpO2:  [92 %-100 %] 95 %    Physical Exam   Constitutional: He is oriented to person, place, and time. He appears well-developed and well-nourished. No distress.   HENT:   Head: Normocephalic and atraumatic.   Right Ear: External ear normal.   Left Ear: External ear normal.   Nose: Nose normal.   Mouth/Throat: Oropharynx is clear and moist. No oropharyngeal exudate.   Eyes: Pupils are equal, round, and reactive to light. Conjunctivae and EOM are normal. Right eye exhibits no discharge. Left eye exhibits no discharge. No scleral icterus.   Neck: Normal range of motion. Neck supple. No JVD present. No tracheal deviation present. No thyromegaly present.   Suspected goiter- reportedly chronic - outpatient f/u recommended   Cardiovascular: Normal rate, regular rhythm, normal heart sounds and intact distal pulses.  Exam reveals no gallop and no friction rub.    No murmur heard.  Pulmonary/Chest: Effort normal and breath sounds normal. No stridor. No respiratory distress. He has no wheezes. He has no rales. He exhibits no tenderness.   Abdominal: Soft. Bowel sounds are normal. He exhibits no distension and no mass. There is no tenderness. There is no rebound and no guarding.   Musculoskeletal: Normal range of motion. He exhibits no edema, tenderness or deformity.   Lymphadenopathy:     He has no cervical adenopathy.   Neurological: He is alert and oriented to person, place, and time. He has normal reflexes. No cranial nerve deficit. He exhibits normal muscle tone. Coordination normal.   Skin: Skin is warm and dry. No rash noted. He is not diaphoretic. No erythema. No pallor.   Diffuse milbiloform rash, left arm and face spared, blanching   Continues to slowly improve   Psychiatric: He has a normal mood and affect. His behavior is normal. Judgment and thought content normal.   Nursing note and vitals  reviewed.      Fluids    Intake/Output Summary (Last 24 hours) at 07/25/19 1121  Last data filed at 07/24/19 2200   Gross per 24 hour   Intake                0 ml   Output             1600 ml   Net            -1600 ml       Laboratory  Recent Labs      07/24/19 0318   WBC  9.5   RBC  4.25*   HEMOGLOBIN  12.9*   HEMATOCRIT  39.5*   MCV  92.9   MCH  30.4   MCHC  32.7*   RDW  43.7   PLATELETCT  276   MPV  9.6     Recent Labs      07/24/19 0318   SODIUM  135   POTASSIUM  3.8   CHLORIDE  100   CO2  25   GLUCOSE  273*   BUN  16   CREATININE  0.91   CALCIUM  8.8             Recent Labs      07/24/19 0318   TRIGLYCERIDE  597*   HDL  29*   LDL  see below       Imaging  EC-ECHOCARDIOGRAM COMPLETE W/O CONT   Final Result      MR-BRAIN-W/O   Final Result      1.  Diffuse abnormal supratentorial white matter T2 hyperintensity. This is unchanged since the previous MRI dated 10/3/2012. This finding likely represents chronic white matter injury which can be secondary to the chronic sequela of toxic, metabolic,    microvascular, infectious ,inflammatory, demyelinating or dysmyelinating etiologies.   2.  Moderate cerebral atrophy.   3.  No acute abnormality.   4.  Stable cystlike structure abutting left anterior globe .      DX-CHEST-PORTABLE (1 VIEW)   Final Result      Hypoaeration changes without acute abnormality.      CT-CTA HEAD WITH & W/O-POST PROCESS   Final Result      No significant abnormality of the intracranial arteries.      CT-CTA NECK WITH & W/O-POST PROCESSING   Final Result      No significant abnormality of the neck arteries.      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      CT-HEAD W/O   Final Result      1.  No CT evidence of acute infarct, hemorrhage or mass.   2.  Unchanged confluent areas of white matter hypoattenuation, suggestive of chronic small vessel ischemic changes, demyelination or gliosis.      CT-CEREBRAL PERFUSION ANALYSIS   Final Result      1.   Cerebral blood flow less than 30% likely representing completed infarct = 0 mL.      2.  T Max more than 6 seconds likely representing combination of completed infarct and ischemia = 0 mL.      3.  Mismatched volume likely representing ischemic brain/penumbra = None      4.  Please note that the cerebral perfusion was performed on the limited brain tissue around the basal ganglia region. Infarct/ischemia outside the CT perfusion sections can be missed in this study.            Assessment/Plan  * Acute right-sided weakness- (present on admission)   Assessment & Plan    This patient has hx of conversion   Not consistent with Santosh's paralysis  Functional component  CTA in ER wnl  PT recommended snf but not a candidate as observation and weakness resolved this am  Continue Asa, statin   MRI brain shows no acute findings  Echocardiogram wnl  Seen by neuro who has signed off - suspect functional weakness - psych eval recommended and requested  Group home to eval pending to see if they can take him back     Rash   Assessment & Plan    Reportedly present for several months  Suspect due to recent tetracycline  Improving  Continue to follow     HLD (hyperlipidemia)- (present on admission)   Assessment & Plan    Continue statin therapy      Type 2 diabetes mellitus without complication, without long-term current use of insulin (HCC)- (present on admission)   Assessment & Plan    Ongoing hyperglycemia, per nursing, he ate a large bag of M&M's last night  Dietary compliance discussed and recommended  Continue metformin  Continue SSI  accu checks  Following     PTSD (post-traumatic stress disorder)- (present on admission)   Assessment & Plan    Continue buspar  Dose of zoloft increased by psych     Seizure (HCC)- (present on admission)   Assessment & Plan    No active seizure activity  Continue depakote   Seizure precautions      Anemia- (present on admission)   Assessment & Plan    Mild, no evidence of bleeding        Asthma-  (present on admission)   Assessment & Plan    No evidence of acute exacerbation   Continue singulair  Continue resp care protocol      Obesity- (present on admission)   Assessment & Plan    Encouraged weight loss     Acquired hypothyroidism- (present on admission)   Assessment & Plan    Continue levothyroxine   Tsh elevated but decreased in comparison to march levels            VTE prophylaxis: scd

## 2019-07-25 NOTE — THERAPY
"Speech Language Therapy dysphagia treatment completed.   Functional Status:  Patient seen for swallow reassessment this date. Patient awake, alert and oriented in all spheres. Speech fluent and intelligible and per patient report is at baseline. Patient anxious to upgrade diet.  Presentation of PO consisted of thin liquids, soft solids (cheese) and solids (crackers, salad with raw vegetables). Patient needed assistance with cutting up foods, but otherwise, was able to self feed independently. Patient was able to swallow all consistencies without any overt S/Sx of aspiration noted.  He did need minimal verbal cues to slow down and take smaller bites.  At this time, would recommend diet upgrade to regular diet (ADA) with thin liquids.  He will need assistance with tray set up and cutting his foods.  SLP will follow X1 more visit.  Education to patient regarding swallowing precautions.     Recommendations: regular diet (ADA) with thin liquids--will need assistance with set up and cutting foods  Plan of Care: Will benefit from Speech Therapy X1 more visit  Post-Acute Therapy: Currently anticipate no further skilled Speech Therapy needs once patient is discharged from the inpatient setting.    See \"Rehab Therapy-Acute\" Patient Summary Report for complete documentation.     "

## 2019-07-25 NOTE — PROGRESS NOTES
Patient had HS BG of 358, 5 units administered. Patient slept well through the night, no complaints of pain. Urine output of 550 throughout shift. Patient disoriented to date with no other neuro changes. Will continue to monitor.

## 2019-07-26 LAB
GLUCOSE BLD-MCNC: 212 MG/DL (ref 65–99)
GLUCOSE BLD-MCNC: 212 MG/DL (ref 65–99)
GLUCOSE BLD-MCNC: 250 MG/DL (ref 65–99)
GLUCOSE BLD-MCNC: 253 MG/DL (ref 65–99)
GLUCOSE BLD-MCNC: 285 MG/DL (ref 65–99)
GLUCOSE BLD-MCNC: 303 MG/DL (ref 65–99)

## 2019-07-26 PROCEDURE — A9270 NON-COVERED ITEM OR SERVICE: HCPCS | Performed by: HOSPITALIST

## 2019-07-26 PROCEDURE — 700102 HCHG RX REV CODE 250 W/ 637 OVERRIDE(OP): Performed by: PSYCHIATRY & NEUROLOGY

## 2019-07-26 PROCEDURE — 700102 HCHG RX REV CODE 250 W/ 637 OVERRIDE(OP): Performed by: HOSPITALIST

## 2019-07-26 PROCEDURE — 97535 SELF CARE MNGMENT TRAINING: CPT

## 2019-07-26 PROCEDURE — 97530 THERAPEUTIC ACTIVITIES: CPT

## 2019-07-26 PROCEDURE — A9270 NON-COVERED ITEM OR SERVICE: HCPCS | Performed by: PSYCHIATRY & NEUROLOGY

## 2019-07-26 PROCEDURE — 96372 THER/PROPH/DIAG INJ SC/IM: CPT

## 2019-07-26 PROCEDURE — 82962 GLUCOSE BLOOD TEST: CPT

## 2019-07-26 PROCEDURE — 97116 GAIT TRAINING THERAPY: CPT | Mod: XU

## 2019-07-26 PROCEDURE — G0378 HOSPITAL OBSERVATION PER HR: HCPCS

## 2019-07-26 PROCEDURE — 99225 PR SUBSEQUENT OBSERVATION CARE,LEVEL II: CPT | Performed by: HOSPITALIST

## 2019-07-26 RX ADMIN — INSULIN HUMAN 2 UNITS: 100 INJECTION, SOLUTION PARENTERAL at 00:07

## 2019-07-26 RX ADMIN — INSULIN HUMAN 4 UNITS: 100 INJECTION, SOLUTION PARENTERAL at 17:45

## 2019-07-26 RX ADMIN — FAMOTIDINE 20 MG: 20 TABLET ORAL at 06:40

## 2019-07-26 RX ADMIN — INSULIN HUMAN 3 UNITS: 100 INJECTION, SOLUTION PARENTERAL at 20:56

## 2019-07-26 RX ADMIN — ATORVASTATIN CALCIUM 80 MG: 80 TABLET, FILM COATED ORAL at 20:54

## 2019-07-26 RX ADMIN — DIVALPROEX SODIUM 500 MG: 500 TABLET, DELAYED RELEASE ORAL at 06:40

## 2019-07-26 RX ADMIN — ASPIRIN 325 MG: 325 TABLET, FILM COATED ORAL at 06:40

## 2019-07-26 RX ADMIN — SENNOSIDES, DOCUSATE SODIUM 2 TABLET: 50; 8.6 TABLET, FILM COATED ORAL at 06:39

## 2019-07-26 RX ADMIN — SERTRALINE HYDROCHLORIDE 150 MG: 100 TABLET ORAL at 06:39

## 2019-07-26 RX ADMIN — METFORMIN HYDROCHLORIDE 500 MG: 500 TABLET, FILM COATED ORAL at 17:39

## 2019-07-26 RX ADMIN — METFORMIN HYDROCHLORIDE 500 MG: 500 TABLET, FILM COATED ORAL at 06:40

## 2019-07-26 RX ADMIN — GLIMEPIRIDE 4 MG: 4 TABLET ORAL at 06:39

## 2019-07-26 RX ADMIN — LEVOTHYROXINE SODIUM 150 MCG: 150 TABLET ORAL at 06:39

## 2019-07-26 RX ADMIN — INSULIN HUMAN 2 UNITS: 100 INJECTION, SOLUTION PARENTERAL at 09:24

## 2019-07-26 RX ADMIN — BUSPIRONE HYDROCHLORIDE 10 MG: 10 TABLET ORAL at 17:39

## 2019-07-26 RX ADMIN — INSULIN HUMAN 3 UNITS: 100 INJECTION, SOLUTION PARENTERAL at 12:07

## 2019-07-26 RX ADMIN — BUSPIRONE HYDROCHLORIDE 10 MG: 10 TABLET ORAL at 06:40

## 2019-07-26 RX ADMIN — DIVALPROEX SODIUM 500 MG: 500 TABLET, DELAYED RELEASE ORAL at 17:40

## 2019-07-26 RX ADMIN — FAMOTIDINE 20 MG: 20 TABLET ORAL at 17:40

## 2019-07-26 RX ADMIN — PRAZOSIN HYDROCHLORIDE 2 MG: 2 CAPSULE ORAL at 17:40

## 2019-07-26 ASSESSMENT — ENCOUNTER SYMPTOMS
FEVER: 0
SHORTNESS OF BREATH: 0
CONSTITUTIONAL NEGATIVE: 1
PSYCHIATRIC NEGATIVE: 1
GASTROINTESTINAL NEGATIVE: 1
MYALGIAS: 0
CHILLS: 0
HEADACHES: 0
DIZZINESS: 0
SPEECH CHANGE: 0
DEPRESSION: 0
CARDIOVASCULAR NEGATIVE: 1
NAUSEA: 0
RESPIRATORY NEGATIVE: 1
DIAPHORESIS: 0
WEIGHT LOSS: 0
FOCAL WEAKNESS: 0
MUSCULOSKELETAL NEGATIVE: 1
BRUISES/BLEEDS EASILY: 0
COUGH: 0
VOMITING: 0
WEAKNESS: 0
ABDOMINAL PAIN: 0
BLURRED VISION: 0
PALPITATIONS: 0
EYES NEGATIVE: 1
SORE THROAT: 0

## 2019-07-26 ASSESSMENT — COGNITIVE AND FUNCTIONAL STATUS - GENERAL
SUGGESTED CMS G CODE MODIFIER MOBILITY: CL
TURNING FROM BACK TO SIDE WHILE IN FLAT BAD: A LOT
MOVING FROM LYING ON BACK TO SITTING ON SIDE OF FLAT BED: UNABLE
DRESSING REGULAR LOWER BODY CLOTHING: A LOT
WALKING IN HOSPITAL ROOM: A LITTLE
HELP NEEDED FOR BATHING: A LOT
MOVING TO AND FROM BED TO CHAIR: A LOT
DAILY ACTIVITIY SCORE: 14
EATING MEALS: A LITTLE
DRESSING REGULAR UPPER BODY CLOTHING: A LOT
PERSONAL GROOMING: A LITTLE
CLIMB 3 TO 5 STEPS WITH RAILING: TOTAL
STANDING UP FROM CHAIR USING ARMS: A LITTLE
SUGGESTED CMS G CODE MODIFIER DAILY ACTIVITY: CK
TOILETING: A LOT
MOBILITY SCORE: 12

## 2019-07-26 ASSESSMENT — GAIT ASSESSMENTS
GAIT LEVEL OF ASSIST: MINIMAL ASSIST
ASSISTIVE DEVICE: FRONT WHEEL WALKER
DEVIATION: STEP TO;BRADYKINETIC;SHUFFLED GAIT;OTHER (COMMENT)
DISTANCE (FEET): 5

## 2019-07-26 ASSESSMENT — PATIENT HEALTH QUESTIONNAIRE - PHQ9
2. FEELING DOWN, DEPRESSED, IRRITABLE, OR HOPELESS: NOT AT ALL
SUM OF ALL RESPONSES TO PHQ9 QUESTIONS 1 AND 2: 0
1. LITTLE INTEREST OR PLEASURE IN DOING THINGS: NOT AT ALL

## 2019-07-26 ASSESSMENT — LIFESTYLE VARIABLES: SUBSTANCE_ABUSE: 0

## 2019-07-26 NOTE — DISCHARGE PLANNING
Anticipated Discharge Disposition: Group Home with     Action: LSW made tc to [facility] with [facility] ([phone]) and left a message for a return call.     LSW made tc to [facility] with [facility] ([phone]) and left a message for a return call.    Barriers to Discharge:  eval    Plan: LSW to f/u with

## 2019-07-26 NOTE — THERAPY
"Physical Therapy Treatment completed.   Bed Mobility:  Supine to Sit: Supervised  Transfers: Sit to Stand: Minimal Assist  Gait: Level Of Assist: Minimal Assist with Front-Wheel Walker       Plan of Care: Will benefit from Physical Therapy 4 times per week  Discharge Recommendations: Equipment: Will Continue to Assess for Equipment Needs. Post-acute therapy Recommend post-acute placement for continued physical therapy services prior to discharge home. Patient can tolerate post-acute therapies at a 5x/week frequency.       See \"Rehab Therapy-Acute\" Patient Summary Report for complete documentation.     Pt presenting w/ improved functional mobility. Pt was able to use R side more during mobility. He was able to use his L side to compensate for his R weakness. Pt was able to ambulate 5 feet today and advanced his R LE w/out assist given cues to weight shift onto his L. Pt does not actively contract his R LE however demonstrates functional strength as he did not buckle once during weight bearing activities. Dependent upon level of assist at , pt might require post acute therapy.  "

## 2019-07-26 NOTE — PROGRESS NOTES
"Hospital Medicine Daily Progress Note    Date of Service  7/26/2019    Chief Complaint  39 y.o. male admitted 7/21/2019 with right sided weakness    Hospital Course        Patient is a 39 y.o. male who presented 7/21/2019 with past medical history of asthma, bipolar, depression, hypothyroidism, seizure disorder who presents with right-sided hemiparesis.  This patient lives in a group home.  Apparently woke up with right arm and right leg hemiparesis.  He has similar presentations in the past that were consistent with possible Santosh's paralysis versus conversion disorder.      Interval Problem Update  No significant change  Again when I asked the patient if I could help reposition him and sit him up for a lung exam, he used full strength with both his right arm and leg, lifted his right arm for approximately 15 seconds and was starting to eat with his right hand and then, abruptly said \"oh my right arm does not work and he dropped it into his lap.\"  He is agreeable to returning to his group home  He denies SI  He is alert and oriented x 3, he states Esmerald did not come to evaluate him yesterday  Ros otherwise negative    Consultants/Specialty  Neurology  Psychiatry    Code Status  full    Disposition  snf declined as he is observation, plan back to group home with home health and outpatient follow up when accepted    Review of Systems  Review of Systems   Constitutional: Negative.  Negative for chills, diaphoresis, fever, malaise/fatigue and weight loss.   HENT: Negative.  Negative for sore throat.    Eyes: Negative.  Negative for blurred vision.   Respiratory: Negative.  Negative for cough and shortness of breath.    Cardiovascular: Negative.  Negative for chest pain, palpitations and leg swelling.   Gastrointestinal: Negative.  Negative for abdominal pain, nausea and vomiting.   Genitourinary: Negative.  Negative for dysuria.   Musculoskeletal: Negative.  Negative for myalgias.   Skin: Negative.  Negative for " itching and rash.   Neurological: Negative for dizziness, speech change, focal weakness, weakness and headaches.   Endo/Heme/Allergies: Negative.  Does not bruise/bleed easily.   Psychiatric/Behavioral: Negative.  Negative for depression, substance abuse and suicidal ideas.   All other systems reviewed and are negative.       Physical Exam  Temp:  [36.1 °C (97 °F)-36.4 °C (97.5 °F)] 36.1 °C (97 °F)  Pulse:  [60-76] 60  Resp:  [15-18] 17  BP: ()/(55-74) 95/55  SpO2:  [90 %-95 %] 94 %    Physical Exam   Constitutional: He is oriented to person, place, and time. He appears well-developed and well-nourished. No distress.   HENT:   Head: Normocephalic and atraumatic.   Right Ear: External ear normal.   Left Ear: External ear normal.   Nose: Nose normal.   Mouth/Throat: Oropharynx is clear and moist. No oropharyngeal exudate.   Eyes: Pupils are equal, round, and reactive to light. Conjunctivae and EOM are normal. Right eye exhibits no discharge. Left eye exhibits no discharge. No scleral icterus.   Neck: Normal range of motion. Neck supple. No JVD present. No tracheal deviation present. No thyromegaly present.   Suspected goiter- reportedly chronic - outpatient f/u recommended   Cardiovascular: Normal rate, regular rhythm, normal heart sounds and intact distal pulses.  Exam reveals no gallop and no friction rub.    No murmur heard.  Pulmonary/Chest: Effort normal and breath sounds normal. No stridor. No respiratory distress. He has no wheezes. He has no rales. He exhibits no tenderness.   Abdominal: Soft. Bowel sounds are normal. He exhibits no distension and no mass. There is no tenderness. There is no rebound and no guarding.   Musculoskeletal: Normal range of motion. He exhibits no edema, tenderness or deformity.   Lymphadenopathy:     He has no cervical adenopathy.   Neurological: He is alert and oriented to person, place, and time. He has normal reflexes. No cranial nerve deficit. He exhibits normal muscle  tone. Coordination normal.   Skin: Skin is warm and dry. No rash noted. He is not diaphoretic. No erythema. No pallor.   Diffuse milbiloform rash  Continues to slowly improve   Psychiatric: He has a normal mood and affect. His behavior is normal. Judgment and thought content normal.   Nursing note and vitals reviewed.      Fluids    Intake/Output Summary (Last 24 hours) at 07/26/19 1244  Last data filed at 07/26/19 0900   Gross per 24 hour   Intake              120 ml   Output             1050 ml   Net             -930 ml       Laboratory  Recent Labs      07/24/19 0318   WBC  9.5   RBC  4.25*   HEMOGLOBIN  12.9*   HEMATOCRIT  39.5*   MCV  92.9   MCH  30.4   MCHC  32.7*   RDW  43.7   PLATELETCT  276   MPV  9.6     Recent Labs      07/24/19 0318   SODIUM  135   POTASSIUM  3.8   CHLORIDE  100   CO2  25   GLUCOSE  273*   BUN  16   CREATININE  0.91   CALCIUM  8.8             Recent Labs      07/24/19 0318   TRIGLYCERIDE  597*   HDL  29*   LDL  see below       Imaging  EC-ECHOCARDIOGRAM COMPLETE W/O CONT   Final Result      MR-BRAIN-W/O   Final Result      1.  Diffuse abnormal supratentorial white matter T2 hyperintensity. This is unchanged since the previous MRI dated 10/3/2012. This finding likely represents chronic white matter injury which can be secondary to the chronic sequela of toxic, metabolic,    microvascular, infectious ,inflammatory, demyelinating or dysmyelinating etiologies.   2.  Moderate cerebral atrophy.   3.  No acute abnormality.   4.  Stable cystlike structure abutting left anterior globe .      DX-CHEST-PORTABLE (1 VIEW)   Final Result      Hypoaeration changes without acute abnormality.      CT-CTA HEAD WITH & W/O-POST PROCESS   Final Result      No significant abnormality of the intracranial arteries.      CT-CTA NECK WITH & W/O-POST PROCESSING   Final Result      No significant abnormality of the neck arteries.      Nodular enhancing soft tissue in the anterior neck probably ectopic thyroid  tissue.      CT-HEAD W/O   Final Result      1.  No CT evidence of acute infarct, hemorrhage or mass.   2.  Unchanged confluent areas of white matter hypoattenuation, suggestive of chronic small vessel ischemic changes, demyelination or gliosis.      CT-CEREBRAL PERFUSION ANALYSIS   Final Result      1.  Cerebral blood flow less than 30% likely representing completed infarct = 0 mL.      2.  T Max more than 6 seconds likely representing combination of completed infarct and ischemia = 0 mL.      3.  Mismatched volume likely representing ischemic brain/penumbra = None      4.  Please note that the cerebral perfusion was performed on the limited brain tissue around the basal ganglia region. Infarct/ischemia outside the CT perfusion sections can be missed in this study.            Assessment/Plan  * Acute right-sided weakness- (present on admission)   Assessment & Plan    This patient has hx of conversion   Not consistent with Santosh's paralysis  Functional component  CTA in ER wnl  PT recommended snf but not a candidate as observation and weakness resolved this am  Continue Asa, statin   MRI brain shows no acute findings  Echocardiogram wnl  Seen by neuro who has signed off - suspect functional weakness - psych eval recommended and requested  Group home to eval pending to see if they can take him back     Rash   Assessment & Plan    Reportedly present for several months  Suspect due to recent tetracycline  Improving  Continue to follow     HLD (hyperlipidemia)- (present on admission)   Assessment & Plan    Continue statin therapy      Type 2 diabetes mellitus without complication, without long-term current use of insulin (HCC)- (present on admission)   Assessment & Plan    Ongoing hyperglycemia, per nursing, he ate a large bag of M&M's last night  Dietary compliance discussed and recommended  Continue metformin  Continue SSI  accu checks  Following     PTSD (post-traumatic stress disorder)- (present on admission)    Assessment & Plan    Continue buspar  Dose of zoloft increased by psych     Seizure (HCC)- (present on admission)   Assessment & Plan    No active seizure activity  Continue depakote   Seizure precautions      Anemia- (present on admission)   Assessment & Plan    Mild, no evidence of bleeding        Asthma- (present on admission)   Assessment & Plan    No evidence of acute exacerbation   Continue singulair  Continue resp care protocol      Obesity- (present on admission)   Assessment & Plan    Encouraged weight loss     Acquired hypothyroidism- (present on admission)   Assessment & Plan    Continue levothyroxine   Tsh elevated but decreased in comparison to march levels  Likely contributing to his depression  Recommend repeat thyroid function tests in 4 weeks            VTE prophylaxis: scd

## 2019-07-26 NOTE — PROGRESS NOTES
Patient got up to chair for lunch and stayed up to play on laptop. Avery MARTI providing chair bath due to patient refusing shower at this time.

## 2019-07-26 NOTE — PROGRESS NOTES
Patient slept well throughout shift. Patient given 2 units of insulin for an HS BG of 212. Patient denies pain, patient is turning well in bed. Patient still complains of RUE and RLE paralysis. Will continue to monitor.

## 2019-07-26 NOTE — THERAPY
"Occupational Therapy Treatment completed with focus on ADLs, ADL transfers, patient education and upper extremity function.  Plan of Care: Will benefit from Occupational Therapy 4 times per week  Discharge Recommendations:  Equipment Will Continue to Assess for Equipment Needs. Post-acute therapy Recommend post-acute placement for additional occupational therapy services prior to discharge home. Patient can tolerate post-acute therapies at a 5x/week frequency.    Patient, received in chair, seen for OT treat focused on seated / standing ADLs with varied support. Patient required Max A  LB seated and Max A standing toileting trial with FWW and male urinal. Patient Min A STS from chair. Patient would benefit from skilled OT in this setting followed by post acute placement.     Patient report eager to get back home due to softball game next Thursday. Patient educated course of stay and POC. Patient remained focus on returning home for game where he is team catcher.     See \"Rehab Therapy-Acute\" Patient Summary Report for complete documentation.   "

## 2019-07-26 NOTE — CARE PLAN
Problem: Venous Thromboembolism (VTW)/Deep Vein Thrombosis (DVT) Prevention:  Goal: Patient will participate in Venous Thrombosis (VTE)/Deep Vein Thrombosis (DVT)Prevention Measures    Intervention: Encourage ambulation/mobilization at level directed by Physical Therapy in collaboration with Interdisciplinary Team  Patient OOB to chair at this time playing on laptop.      Problem: Bowel/Gastric:  Goal: Will not experience complications related to bowel motility    Intervention: Assess baseline bowel pattern  Last BM yesterday 7/25

## 2019-07-27 LAB
GLUCOSE BLD-MCNC: 229 MG/DL (ref 65–99)
GLUCOSE BLD-MCNC: 254 MG/DL (ref 65–99)
GLUCOSE BLD-MCNC: 283 MG/DL (ref 65–99)
GLUCOSE BLD-MCNC: 300 MG/DL (ref 65–99)

## 2019-07-27 PROCEDURE — G0378 HOSPITAL OBSERVATION PER HR: HCPCS

## 2019-07-27 PROCEDURE — 96372 THER/PROPH/DIAG INJ SC/IM: CPT

## 2019-07-27 PROCEDURE — 700102 HCHG RX REV CODE 250 W/ 637 OVERRIDE(OP): Performed by: HOSPITALIST

## 2019-07-27 PROCEDURE — 99225 PR SUBSEQUENT OBSERVATION CARE,LEVEL II: CPT | Performed by: HOSPITALIST

## 2019-07-27 PROCEDURE — A9270 NON-COVERED ITEM OR SERVICE: HCPCS | Performed by: HOSPITALIST

## 2019-07-27 PROCEDURE — 82962 GLUCOSE BLOOD TEST: CPT

## 2019-07-27 PROCEDURE — A9270 NON-COVERED ITEM OR SERVICE: HCPCS | Performed by: PSYCHIATRY & NEUROLOGY

## 2019-07-27 PROCEDURE — 700102 HCHG RX REV CODE 250 W/ 637 OVERRIDE(OP): Performed by: PSYCHIATRY & NEUROLOGY

## 2019-07-27 RX ADMIN — METFORMIN HYDROCHLORIDE 500 MG: 500 TABLET, FILM COATED ORAL at 17:36

## 2019-07-27 RX ADMIN — PRAZOSIN HYDROCHLORIDE 2 MG: 2 CAPSULE ORAL at 17:35

## 2019-07-27 RX ADMIN — ATORVASTATIN CALCIUM 80 MG: 80 TABLET, FILM COATED ORAL at 19:46

## 2019-07-27 RX ADMIN — LEVOTHYROXINE SODIUM 150 MCG: 150 TABLET ORAL at 06:05

## 2019-07-27 RX ADMIN — SENNOSIDES, DOCUSATE SODIUM 2 TABLET: 50; 8.6 TABLET, FILM COATED ORAL at 17:36

## 2019-07-27 RX ADMIN — BUSPIRONE HYDROCHLORIDE 10 MG: 10 TABLET ORAL at 17:36

## 2019-07-27 RX ADMIN — INSULIN HUMAN 2 UNITS: 100 INJECTION, SOLUTION PARENTERAL at 11:59

## 2019-07-27 RX ADMIN — FAMOTIDINE 20 MG: 20 TABLET ORAL at 06:05

## 2019-07-27 RX ADMIN — DIVALPROEX SODIUM 500 MG: 500 TABLET, DELAYED RELEASE ORAL at 06:06

## 2019-07-27 RX ADMIN — GLIMEPIRIDE 4 MG: 4 TABLET ORAL at 06:05

## 2019-07-27 RX ADMIN — DIVALPROEX SODIUM 500 MG: 500 TABLET, DELAYED RELEASE ORAL at 17:36

## 2019-07-27 RX ADMIN — INSULIN HUMAN 3 UNITS: 100 INJECTION, SOLUTION PARENTERAL at 17:37

## 2019-07-27 RX ADMIN — ASPIRIN 325 MG: 325 TABLET, FILM COATED ORAL at 06:05

## 2019-07-27 RX ADMIN — METFORMIN HYDROCHLORIDE 500 MG: 500 TABLET, FILM COATED ORAL at 06:05

## 2019-07-27 RX ADMIN — BUSPIRONE HYDROCHLORIDE 10 MG: 10 TABLET ORAL at 06:04

## 2019-07-27 RX ADMIN — FAMOTIDINE 20 MG: 20 TABLET ORAL at 17:36

## 2019-07-27 RX ADMIN — POLYETHYLENE GLYCOL 3350 1 PACKET: 17 POWDER, FOR SOLUTION ORAL at 17:46

## 2019-07-27 RX ADMIN — INSULIN HUMAN 3 UNITS: 100 INJECTION, SOLUTION PARENTERAL at 20:00

## 2019-07-27 RX ADMIN — INSULIN HUMAN 3 UNITS: 100 INJECTION, SOLUTION PARENTERAL at 07:52

## 2019-07-27 RX ADMIN — SERTRALINE HYDROCHLORIDE 150 MG: 100 TABLET ORAL at 06:05

## 2019-07-27 ASSESSMENT — ENCOUNTER SYMPTOMS
MYALGIAS: 0
FOCAL WEAKNESS: 0
NAUSEA: 0
RESPIRATORY NEGATIVE: 1
PALPITATIONS: 0
ABDOMINAL PAIN: 0
PSYCHIATRIC NEGATIVE: 1
DEPRESSION: 0
SORE THROAT: 0
GASTROINTESTINAL NEGATIVE: 1
WEIGHT LOSS: 0
BRUISES/BLEEDS EASILY: 0
SPEECH CHANGE: 0
CONSTITUTIONAL NEGATIVE: 1
SHORTNESS OF BREATH: 0
DIZZINESS: 0
COUGH: 0
VOMITING: 0
WEAKNESS: 0
FEVER: 0
CARDIOVASCULAR NEGATIVE: 1
MUSCULOSKELETAL NEGATIVE: 1
HEADACHES: 0
DIAPHORESIS: 0
EYES NEGATIVE: 1
BLURRED VISION: 0
CHILLS: 0

## 2019-07-27 ASSESSMENT — LIFESTYLE VARIABLES: SUBSTANCE_ABUSE: 0

## 2019-07-27 NOTE — PROGRESS NOTES
Patient sleeping very well throughout shift, stating physical therapy wore him out today.Patient had HS blood sugar of 253, given 3 units of insulin. Patient denies pain, or further complaints. Will continue to monitor.

## 2019-07-27 NOTE — PROGRESS NOTES
Patient observed to dip down to 82% on RA while sleeping. Patient placed on 2L O2, placed on continuous pulse ox. Dr. Freire informed, no new orders.

## 2019-07-27 NOTE — PROGRESS NOTES
Anticipated Discharge Disposition: Group Home with      Action: RN called Abigail from [facility] at [phone] to explain difficulty in getting callback from Monalisa from [facility] at the [phone] number. Abigail provides me with the cell number of Nabil, the  at [facility]. Advised to call and text him at [phone] to let him know that patient is medically clear and needs transportation back to     Barriers to Discharge: Correspondence with      Plan: Awaiting callback/ return text from Nabil.

## 2019-07-27 NOTE — CARE PLAN
Problem: Safety  Goal: Will remain free from falls    Intervention: Implement fall precautions  Bed alarm armed. Patient instructed to call for assistance before mobilizing self.      Problem: Venous Thromboembolism (VTW)/Deep Vein Thrombosis (DVT) Prevention:  Goal: Patient will participate in Venous Thrombosis (VTE)/Deep Vein Thrombosis (DVT)Prevention Measures    Intervention: Encourage ambulation/mobilization at level directed by Physical Therapy in collaboration with Interdisciplinary Team  Planning on mobilizing patient to chair for bed change after breakfast. See ADLs flowsheet for adherence to plan and/or patient compliance.

## 2019-07-27 NOTE — PROGRESS NOTES
x1 assist provided for patient to shuffle to wheelchair. Patient was able to use weak RUE to lower self to wheelchair. Took patient to healing garden and patient tolerated sitting up outside for at least 30 minutes. Patient back in bed with SCDs on verbalizing appreciation for the fresh air.        Patient also met Dami, the service dog.

## 2019-07-27 NOTE — PROGRESS NOTES
Hospital Medicine Daily Progress Note    Date of Service  7/27/2019    Chief Complaint  39 y.o. male admitted 7/21/2019 with right sided weakness    Hospital Course        Patient is a 39 y.o. male who presented 7/21/2019 with past medical history of asthma, bipolar, depression, hypothyroidism, seizure disorder who presents with right-sided hemiparesis.  This patient lives in a group home.  Apparently woke up with right arm and right leg hemiparesis.  He has similar presentations in the past that were consistent with possible Santosh's paralysis versus conversion disorder.      Interval Problem Update  Pt reports his right sided weakness is improving, he wants to return to his group home, still awaiting representative from group home  He is axox3  Denies pain  No sob  Ros otherwise negative    Consultants/Specialty  Neurology  Psychiatry    Code Status  full    Disposition  snf declined as he is observation, plan back to group home with home health and outpatient follow up when accepted    Review of Systems  Review of Systems   Constitutional: Negative.  Negative for chills, diaphoresis, fever, malaise/fatigue and weight loss.   HENT: Negative.  Negative for sore throat.    Eyes: Negative.  Negative for blurred vision.   Respiratory: Negative.  Negative for cough and shortness of breath.    Cardiovascular: Negative.  Negative for chest pain, palpitations and leg swelling.   Gastrointestinal: Negative.  Negative for abdominal pain, nausea and vomiting.   Genitourinary: Negative.  Negative for dysuria.   Musculoskeletal: Negative.  Negative for myalgias.   Skin: Negative.  Negative for itching and rash.   Neurological: Negative for dizziness, speech change, focal weakness, weakness and headaches.   Endo/Heme/Allergies: Negative.  Does not bruise/bleed easily.   Psychiatric/Behavioral: Negative.  Negative for depression, substance abuse and suicidal ideas.   All other systems reviewed and are negative.       Physical  Exam  Temp:  [36 °C (96.8 °F)-36.4 °C (97.6 °F)] 36.4 °C (97.6 °F)  Pulse:  [60-86] 65  Resp:  [17-18] 18  BP: ()/(48-78) 108/73  SpO2:  [82 %-97 %] 93 %    Physical Exam   Constitutional: He is oriented to person, place, and time. He appears well-developed and well-nourished. No distress.   HENT:   Head: Normocephalic and atraumatic.   Right Ear: External ear normal.   Left Ear: External ear normal.   Nose: Nose normal.   Mouth/Throat: Oropharynx is clear and moist. No oropharyngeal exudate.   Eyes: Pupils are equal, round, and reactive to light. Conjunctivae and EOM are normal. Right eye exhibits no discharge. Left eye exhibits no discharge. No scleral icterus.   Neck: Normal range of motion. Neck supple. No JVD present. No tracheal deviation present. No thyromegaly present.   Suspected goiter- reportedly chronic - outpatient f/u recommended   Cardiovascular: Normal rate, regular rhythm, normal heart sounds and intact distal pulses.  Exam reveals no gallop and no friction rub.    No murmur heard.  Pulmonary/Chest: Effort normal and breath sounds normal. No stridor. No respiratory distress. He has no wheezes. He has no rales. He exhibits no tenderness.   Abdominal: Soft. Bowel sounds are normal. He exhibits no distension and no mass. There is no tenderness. There is no rebound and no guarding.   Musculoskeletal: Normal range of motion. He exhibits no edema, tenderness or deformity.   Lymphadenopathy:     He has no cervical adenopathy.   Neurological: He is alert and oriented to person, place, and time. He has normal reflexes. No cranial nerve deficit. He exhibits normal muscle tone. Coordination normal.   Voluntarily lifted right arm, on exam slightly weaker than left   Skin: Skin is warm and dry. No rash noted. He is not diaphoretic. No erythema. No pallor.   Diffuse milbiloform rash  Rash continues to improve, erythema much decreased   Psychiatric: He has a normal mood and affect. His behavior is normal.  Judgment and thought content normal.   Nursing note and vitals reviewed.      Fluids    Intake/Output Summary (Last 24 hours) at 07/27/19 1144  Last data filed at 07/27/19 0900   Gross per 24 hour   Intake                0 ml   Output             1600 ml   Net            -1600 ml       Laboratory                        Imaging  EC-ECHOCARDIOGRAM COMPLETE W/O CONT   Final Result      MR-BRAIN-W/O   Final Result      1.  Diffuse abnormal supratentorial white matter T2 hyperintensity. This is unchanged since the previous MRI dated 10/3/2012. This finding likely represents chronic white matter injury which can be secondary to the chronic sequela of toxic, metabolic,    microvascular, infectious ,inflammatory, demyelinating or dysmyelinating etiologies.   2.  Moderate cerebral atrophy.   3.  No acute abnormality.   4.  Stable cystlike structure abutting left anterior globe .      DX-CHEST-PORTABLE (1 VIEW)   Final Result      Hypoaeration changes without acute abnormality.      CT-CTA HEAD WITH & W/O-POST PROCESS   Final Result      No significant abnormality of the intracranial arteries.      CT-CTA NECK WITH & W/O-POST PROCESSING   Final Result      No significant abnormality of the neck arteries.      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      CT-HEAD W/O   Final Result      1.  No CT evidence of acute infarct, hemorrhage or mass.   2.  Unchanged confluent areas of white matter hypoattenuation, suggestive of chronic small vessel ischemic changes, demyelination or gliosis.      CT-CEREBRAL PERFUSION ANALYSIS   Final Result      1.  Cerebral blood flow less than 30% likely representing completed infarct = 0 mL.      2.  T Max more than 6 seconds likely representing combination of completed infarct and ischemia = 0 mL.      3.  Mismatched volume likely representing ischemic brain/penumbra = None      4.  Please note that the cerebral perfusion was performed on the limited  brain tissue around the basal ganglia region. Infarct/ischemia outside the CT perfusion sections can be missed in this study.            Assessment/Plan  * Acute right-sided weakness- (present on admission)   Assessment & Plan    This patient has hx of conversion   Not consistent with Santosh's paralysis  Functional component and improving  CTA in ER wnl  PT recommended snf but not a candidate as observation and weakness resolved this am  Continue Asa, statin   MRI brain shows no acute findings  Echocardiogram wnl  Seen by neuro who has signed off - suspect functional weakness - psych eval recommended and requested  Group home to eval pending to see if they can take him back     Rash   Assessment & Plan    Reportedly present for several months  Suspect due to recent tetracycline  Continues to improve  following     HLD (hyperlipidemia)- (present on admission)   Assessment & Plan    Continue statin therapy      Type 2 diabetes mellitus without complication, without long-term current use of insulin (HCC)- (present on admission)   Assessment & Plan    Ongoing hyperglycemia, per nursing, he ate a large bag of M&M's last night  Dietary compliance discussed and recommended  Continue metformin  Continue SSI  accu checks  Following     PTSD (post-traumatic stress disorder)- (present on admission)   Assessment & Plan    Continue buspar  Dose of zoloft increased by psych     Seizure (HCC)- (present on admission)   Assessment & Plan    No active seizure activity  Continue depakote   Seizure precautions      Anemia- (present on admission)   Assessment & Plan    Mild, no evidence of bleeding        Asthma- (present on admission)   Assessment & Plan    No evidence of acute exacerbation   Continue singulair  Continue resp care protocol      Obesity- (present on admission)   Assessment & Plan    Encouraged weight loss     Acquired hypothyroidism- (present on admission)   Assessment & Plan    Continue levothyroxine   Tsh elevated but  decreased in comparison to march levels  Likely contributing to his depression  Recommend repeat thyroid function tests in 4 weeks            VTE prophylaxis: scd

## 2019-07-28 VITALS
SYSTOLIC BLOOD PRESSURE: 106 MMHG | BODY MASS INDEX: 36.45 KG/M2 | TEMPERATURE: 97.3 F | DIASTOLIC BLOOD PRESSURE: 75 MMHG | WEIGHT: 315 LBS | RESPIRATION RATE: 12 BRPM | HEIGHT: 78 IN | OXYGEN SATURATION: 97 % | HEART RATE: 69 BPM

## 2019-07-28 LAB
GLUCOSE BLD-MCNC: 239 MG/DL (ref 65–99)
GLUCOSE BLD-MCNC: 253 MG/DL (ref 65–99)

## 2019-07-28 PROCEDURE — 700102 HCHG RX REV CODE 250 W/ 637 OVERRIDE(OP): Performed by: HOSPITALIST

## 2019-07-28 PROCEDURE — G0378 HOSPITAL OBSERVATION PER HR: HCPCS

## 2019-07-28 PROCEDURE — 96372 THER/PROPH/DIAG INJ SC/IM: CPT

## 2019-07-28 PROCEDURE — 700102 HCHG RX REV CODE 250 W/ 637 OVERRIDE(OP): Performed by: PSYCHIATRY & NEUROLOGY

## 2019-07-28 PROCEDURE — A9270 NON-COVERED ITEM OR SERVICE: HCPCS | Performed by: PSYCHIATRY & NEUROLOGY

## 2019-07-28 PROCEDURE — A9270 NON-COVERED ITEM OR SERVICE: HCPCS | Performed by: HOSPITALIST

## 2019-07-28 PROCEDURE — 82962 GLUCOSE BLOOD TEST: CPT | Mod: 91

## 2019-07-28 PROCEDURE — 99217 PR OBSERVATION CARE DISCHARGE: CPT | Performed by: HOSPITALIST

## 2019-07-28 RX ORDER — SERTRALINE HYDROCHLORIDE 100 MG/1
150 TABLET, FILM COATED ORAL DAILY
Qty: 30 TAB | Refills: 0 | Status: SHIPPED
Start: 2019-07-28 | End: 2019-12-27

## 2019-07-28 RX ADMIN — ACETAMINOPHEN 650 MG: 325 TABLET, FILM COATED ORAL at 08:41

## 2019-07-28 RX ADMIN — INSULIN HUMAN 2 UNITS: 100 INJECTION, SOLUTION PARENTERAL at 07:13

## 2019-07-28 RX ADMIN — FAMOTIDINE 20 MG: 20 TABLET ORAL at 05:41

## 2019-07-28 RX ADMIN — ASPIRIN 325 MG: 325 TABLET, FILM COATED ORAL at 05:40

## 2019-07-28 RX ADMIN — INSULIN HUMAN 3 UNITS: 100 INJECTION, SOLUTION PARENTERAL at 12:43

## 2019-07-28 RX ADMIN — BUSPIRONE HYDROCHLORIDE 10 MG: 10 TABLET ORAL at 05:40

## 2019-07-28 RX ADMIN — LEVOTHYROXINE SODIUM 150 MCG: 150 TABLET ORAL at 05:41

## 2019-07-28 RX ADMIN — SERTRALINE HYDROCHLORIDE 150 MG: 100 TABLET ORAL at 05:41

## 2019-07-28 RX ADMIN — DIVALPROEX SODIUM 500 MG: 500 TABLET, DELAYED RELEASE ORAL at 05:41

## 2019-07-28 RX ADMIN — METFORMIN HYDROCHLORIDE 500 MG: 500 TABLET, FILM COATED ORAL at 05:41

## 2019-07-28 RX ADMIN — GLIMEPIRIDE 4 MG: 4 TABLET ORAL at 05:41

## 2019-07-28 NOTE — PROGRESS NOTES
Pt disoriented to time, easily reorients. Denies pain, denies numbness and tingling, reports one episode of numbness to RUE this afternoon that has resolved. Pt able to move RUE at times, noted able to feed self. Pt needing education about better options for diabetic diet, pt receptive to education. Left eye remains opaque and blind. Pt tolerating diet, +flatus, no BM yet, pt previously medicated with Miralax.

## 2019-07-28 NOTE — PROGRESS NOTES
Pt discharged to [facility]. Pt escorted to [facility] via wheelchair by member of  nursing staff. Pt transported by taxi. Nabil the  of [facility] notified of patients departure. Education r/t discharge instructions, medication compliance, and follow up appts provided to pt. Pt verbalized understanding. All belongings with pt at time of discharge.

## 2019-07-28 NOTE — CARE PLAN
Problem: Knowledge Deficit  Goal: Knowledge of disease process/condition, treatment plan, diagnostic tests, and medications will improve  Outcome: PROGRESSING AS EXPECTED  Educated pt about foods related to blood sugars   Pt reports eating scalloped potatoes with cream sauce for dinner   Blood sugar this evening was 300   Pt asking for regular pudding   Education provided to pt and diabetic pudding was given to pt       Problem: Pain Management  Goal: Pain level will decrease to patient's comfort goal  Outcome: PROGRESSING AS EXPECTED  Pt denies pain at this time   Elevating RUE with pillows   Pt able to move RUE at times

## 2019-07-28 NOTE — DISCHARGE SUMMARY
Discharge Summary    CHIEF COMPLAINT ON ADMISSION  Chief Complaint   Patient presents with   • Possible Stroke       Reason for Admission  possible stroke      Admission Date  7/21/2019    CODE STATUS  Full Code    HPI & HOSPITAL COURSE  This is a 39 y.o. male here with past medical history of asthma, bipolar, depression, hypothyroidism, seizure disorder who presents with right-sided hemiparesis.  This patient lives in a group home.  Apparently woke up with right arm and right leg hemiparesis.  He has similar presentations in the past that were consistent with possible Santosh's paralysis versus conversion disorder.  Imaging here showed no acute abnormalities.  He was seen by neurology who felt this was functional.  He was also seen by psychiatry.  PT recommended snf however he was not accepted.  I agree with neurology, his exams have consistently been indicative of functional weakness.  He has normal strength with distraction and at time of discharge he also has normal strength voluntarily, although it is delayed.  Depression from his father's recent death and his hypothyroid are likely contributing.  Psychiatry did increase his zoloft dose and close follow up with his regular psychiatrist is highly recommended.  His TSH remains significantly elevated but is decreasing - I recommend that repeat thyroid function tests be performed in 4 weeks.  He also had a chronic rash that is resolving, I suspect it may have been to the tetracycline that he had been on - the indication for this antibiotic was unclear.  He will continue PT and OT with home health and will discharge back to the group home with a walker.  Again he will require close follow up with his pcp and psychiatry.         Therefore, he is discharged in fair and stable condition to his group home with home healthThe patient met 2-midnight criteria for an inpatient stay at the time of discharge.    Discharge Date  7/28/19    FOLLOW UP ITEMS POST  DISCHARGE  Pcp  Psychiatry  Home health    DISCHARGE DIAGNOSES  Principal Problem:    Acute right-sided weakness POA: Yes  Active Problems:    Acquired hypothyroidism POA: Yes    Obesity POA: Yes    Asthma POA: Yes    Anemia POA: Yes    Seizure (HCC) POA: Yes    PTSD (post-traumatic stress disorder) POA: Yes    Type 2 diabetes mellitus without complication, without long-term current use of insulin (HCC) POA: Yes    HLD (hyperlipidemia) POA: Yes    Rash POA: Unknown  Resolved Problems:    Leukocytosis POA: Yes      FOLLOW UP  No future appointments.  Yonis Roche  [address] [place] [id number]  [phone]    Go on 8/1/2019  Please arrive at 1:30pm for your 1:45pm appointment. Thank you      MEDICATIONS ON DISCHARGE     Medication List      CHANGE how you take these medications      Instructions   sertraline 100 MG Tabs  What changed:  how much to take  Commonly known as:  ZOLOFT   Take 1.5 Tabs by mouth every day.  Dose:  150 mg        CONTINUE taking these medications      Instructions   aspirin EC 81 MG Tbec  Commonly known as:  ECOTRIN   Take 81 mg by mouth every day.  Dose:  81 mg     atorvastatin 80 MG tablet  Commonly known as:  LIPITOR   Take 80 mg by mouth every evening.  Dose:  80 mg     BASAGLAR KWIKPEN 100 UNIT/ML Sopn injection  Generic drug:  insulin glargine   Inject 20 Units as instructed every evening.  Dose:  20 Units     busPIRone 10 MG Tabs tablet  Commonly known as:  BUSPAR   Take 10 mg by mouth 2 times a day.  Dose:  10 mg     divalproex 500 MG Tbec  Commonly known as:  DEPAKOTE   Take 500 mg by mouth 2 Times a Day.  Dose:  500 mg     glimepiride 4 MG Tabs  Commonly known as:  AMARYL   Take 4 mg by mouth every morning.  Dose:  4 mg     LATUDA 20 MG Tabs  Generic drug:  lurasidone   Take 20 mg by mouth every evening.  Dose:  20 mg     levothyroxine 150 MCG Tabs  Commonly known as:  SYNTHROID   Take 150 mcg by mouth Every morning on an empty stomach.  Dose:  150 mcg     lidocaine 5 %  "Ptch  Commonly known as:  LIDODERM   Apply 1 Patch to skin as directed every 12 hours. Apply to lower back  Dose:  1 Patch     metFORMIN 500 MG Tabs  Commonly known as:  GLUCOPHAGE   Take 500 mg by mouth 2 times a day.  Dose:  500 mg     prazosin 2 MG Caps  Commonly known as:  MINIPRESS   Take 2 mg by mouth every evening.  Dose:  2 mg     raNITidine 150 MG Tabs  Commonly known as:  ZANTAC   Take 150 mg by mouth 2 times a day.  Dose:  150 mg        STOP taking these medications    tetracycline 250 MG Caps  Commonly known as:  ACHROMYCIN, SUMYCIN            Allergies  Allergies   Allergen Reactions   • Abilify Unspecified     \"Feeling tired, like I don't even know whats going on around me\"   • Fish      Pt reports fish causes him to be sick to his stomach         DIET  Orders Placed This Encounter   Procedures   • Diet Order Diabetic (please cut up foods)     Standing Status:   Standing     Number of Occurrences:   1     Order Specific Question:   Diet:     Answer:   Diabetic [3]     Comments:   please cut up foods     Order Specific Question:   Consistency/Fluid modifications:     Answer:   Thin Liquids [3]       ACTIVITY  As tolerated.    CONSULTATIONS  Neurology  Psychiatry    PROCEDURES  EC-ECHOCARDIOGRAM COMPLETE W/O CONT   Final Result      MR-BRAIN-W/O   Final Result      1.  Diffuse abnormal supratentorial white matter T2 hyperintensity. This is unchanged since the previous MRI dated 10/3/2012. This finding likely represents chronic white matter injury which can be secondary to the chronic sequela of toxic, metabolic,    microvascular, infectious ,inflammatory, demyelinating or dysmyelinating etiologies.   2.  Moderate cerebral atrophy.   3.  No acute abnormality.   4.  Stable cystlike structure abutting left anterior globe .      DX-CHEST-PORTABLE (1 VIEW)   Final Result      Hypoaeration changes without acute abnormality.      CT-CTA HEAD WITH & W/O-POST PROCESS   Final Result      No significant abnormality " of the intracranial arteries.      CT-CTA NECK WITH & W/O-POST PROCESSING   Final Result      No significant abnormality of the neck arteries.      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      CT-HEAD W/O   Final Result      1.  No CT evidence of acute infarct, hemorrhage or mass.   2.  Unchanged confluent areas of white matter hypoattenuation, suggestive of chronic small vessel ischemic changes, demyelination or gliosis.      CT-CEREBRAL PERFUSION ANALYSIS   Final Result      1.  Cerebral blood flow less than 30% likely representing completed infarct = 0 mL.      2.  T Max more than 6 seconds likely representing combination of completed infarct and ischemia = 0 mL.      3.  Mismatched volume likely representing ischemic brain/penumbra = None      4.  Please note that the cerebral perfusion was performed on the limited brain tissue around the basal ganglia region. Infarct/ischemia outside the CT perfusion sections can be missed in this study.            LABORATORY  Lab Results   Component Value Date    SODIUM 135 07/24/2019    POTASSIUM 3.8 07/24/2019    CHLORIDE 100 07/24/2019    CO2 25 07/24/2019    GLUCOSE 273 (H) 07/24/2019    BUN 16 07/24/2019    CREATININE 0.91 07/24/2019        Lab Results   Component Value Date    WBC 9.5 07/24/2019    HEMOGLOBIN 12.9 (L) 07/24/2019    HEMATOCRIT 39.5 (L) 07/24/2019    PLATELETCT 276 07/24/2019        Total time of the discharge process exceeds 45 minutes.

## 2019-07-28 NOTE — PROGRESS NOTES
"Ambulated patient to bathroom for void and BM, patient also walked in halls, and back to bed. Patient tolerated walking 50ft with stand by assist and fww well. No LOB. Patient slow and steady. Patient states, \"I had a walker at home, but I gave it away to someone who needed it more. Pagekatia Hoyos MD with prompt response received to request home FWW for DC.  "

## 2019-07-28 NOTE — DISCHARGE INSTRUCTIONS
Posttraumatic Stress Disorder  Posttraumatic stress disorder (PTSD) is a mental disorder. It occurs after a traumatic event in your life. The traumatic events that cause PTSD are outside the range of normal human experience. Examples of these events include war, automobile accidents, natural disasters, rape, domestic violence, and violent crimes. Most people who experience these types of events are able to heal on their own. Those who do not heal develop PTSD. PTSD can happen to anyone at any age. However, people with a history of childhood abuse are at increased risk for developing PTSD.   SYMPTOMS   The traumatic event that causes PTSD must be a threat to life, cause serious injury, or involve sexual violence. The traumatic event is usually experienced directly by the person who develops PTSD. Sometimes PTSD occurs in people who witness traumas that occur to others or who hear about a trauma that occurs to a close family member or friend. The following behaviors are characteristic of people with PTSD:  · People with PTSD re-experience the traumatic event in one or more of the following ways (intrusion symptoms):  ¨ Recurrent, unwanted distressing memories while awake.  ¨ Recurrent distressing dreams.   ¨ Sensations similar to those felt when the event originally occurred (flashbacks).    ¨ Intense or prolonged emotional distress, triggered by reminders of the trauma. This may include fear, horror, intense sadness, or anger.  ¨ Marked physical reactions, triggered by reminders of the trauma. This may include racing heart, shortness of breath, sweating, and shaking.  · People with PTSD avoid thoughts, conversations, people, or activities that remind them of the traumatic event (avoidance symptoms).  · People with PTSD have negative changes in their thinking and mood after the traumatic event. These changes include:  ¨ Inability to remember one or more significant aspects of the traumatic event (memory  gaps).  ¨ Exaggerated negative perceptions about themselves or others, such as believing that they are bad people or that no one can be trusted.  ¨ Unrealistic assignment of blame to themselves or others for the traumatic event.  ¨ Persistent negative emotional state, such as fear, horror, anger, sadness, guilt, or shame.  ¨ Markedly decreased interest or participation in significant activities.  ¨ A loss of connection with other people.  ¨ Inability to experience positive emotions, such as happiness or love.  · People with PTSD are more sensitive to their environment and react more easily than others (hyperarousal-overreactivity symptoms). These symptoms include:  ¨ Irritability, with angry outbursts toward other people or objects. The outbursts are easily triggered and may be verbal or physical.  ¨ Careless or self-destructive behavior. This may include reckless driving or drug use.  ¨ A feeling of being on edge, with increased alertness (hypervigilance).  ¨ Exaggerated reactions to stimuli, such as being easily startled.    ¨ Difficulty concentrating.    ¨ Difficulty sleeping.  PTSD symptoms may start soon after a frightening event or months or years later. They last at least 1 month or longer and can affect one or more areas of functioning, such as social or occupational functioning.   DIAGNOSIS   PTSD is diagnosed through an assessment by a mental health professional. You will be asked questions about the traumatic events in your life. You will also be asked about how these events have changed your thoughts, mood, behavior, and ability to function on a daily basis. You may be asked about your use of alcohol or drugs, which can make PTSD symptoms worse.  TREATMENT   Unlike many mental disorders, which require lifelong management, PTSD is a curable condition. The goal of PTSD treatment is to neutralize the negative effects of the traumatic event on daily functioning, not erase the memory of the event. The  following treatments may be prescribed to reach this goal:  · Medicines. Certain medicines can reduce some PTSD symptoms. Intrusion symptoms and hyperarousal-overactivity symptoms respond best to medicines.  · Counseling (talk therapy). Talk therapy with a mental health professional who is experienced in treating PTSD can help. Talk therapy can provide education, emotional support, and coping skills. Certain types of talk therapy that specifically target the traumatic events are the most effective treatment for PTSD:  ¨ Prolonged exposure therapy, which involves remembering and processing the traumatic event with a therapist in a safe environment until it no longer creates a negative emotional response.  ¨ Eye movement desensitization and reprocessing therapy, which involves the use of repetitive physical stimulation of the senses that alternates between the right and left sides of the body. It is believed that this therapy facilitates communication between the two sides of the brain. This communication helps the mind to integrate the fragmented memories of the traumatic event into a whole story that makes sense and no longer creates a negative emotional response.  Most people with PTSD benefit from a combination of these treatments.   This information is not intended to replace advice given to you by your health care provider. Make sure you discuss any questions you have with your health care provider.  Document Released: 09/12/2002 Document Revised: 04/10/2017 Document Reviewed: 12/02/2016  [employer] Patient Education © 2017 [employer]  Discharge Instructions    Discharged to group home by car with escort. Discharged via wheelchair, hospital escort: Yes.  Special equipment needed: Wheelchair    Be sure to schedule a follow-up appointment with your primary care doctor or any specialists as instructed.     Discharge Plan:   Influenza Vaccine Indication: Not indicated: Previously immunized this  influenza season and > 8 years of age (2018)    I understand that a diet low in cholesterol, fat, and sodium is recommended for good health. Unless I have been given specific instructions below for another diet, I accept this instruction as my diet prescription.   Other diet: Diabetic    Special Instructions: None    · Is patient discharged on Warfarin / Coumadin?   No     Depression / Suicide Risk    As you are discharged from this [facility] facility, it is important to learn how to keep safe from harming yourself.    Recognize the warning signs:  · Abrupt changes in personality, positive or negative- including increase in energy   · Giving away possessions  · Change in eating patterns- significant weight changes-  positive or negative  · Change in sleeping patterns- unable to sleep or sleeping all the time   · Unwillingness or inability to communicate  · Depression  · Unusual sadness, discouragement and loneliness  · Talk of wanting to die  · Neglect of personal appearance   · Rebelliousness- reckless behavior  · Withdrawal from people/activities they love  · Confusion- inability to concentrate     If you or a loved one observes any of these behaviors or has concerns about self-harm, here's what you can do:  · Talk about it- your feelings and reasons for harming yourself  · Remove any means that you might use to hurt yourself (examples: pills, rope, extension cords, firearm)  · Get professional help from the community (Mental Health, Substance Abuse, psychological counseling)  · Do not be alone:Call your Safe Contact- someone whom you trust who will be there for you.  · Call your local CRISIS HOTLINE 753-8224 or 552-957-9348  · Call your local Children's Mobile Crisis Response Team [place] (201) 832-4959 or www.Sociact  · Call the toll free National Suicide Prevention Hotlines   · National Suicide Prevention Lifeline 874-966-KGTL (0986)  · National Hope Line Network 800-RUHEOLL  (666-6126)      Conversion Disorder  Introduction  Conversion disorder is also known as functional neurological symptom disorder. People with this mental disorder have symptoms that suggest a brain or nervous system condition, such as a stroke or seizure. However, no such condition can be found to explain the symptoms. For people with conversion disorder, the symptoms may result in:  · Severe distress or anxiety.  · Disruption of relationships or other normal life activities.  · Medical evaluation. This often occurs in the primary care or emergency room setting.  Conversion disorder may begin anytime from late childhood through the young adult years. This disorder is more common in females than males.  What are the causes?  The exact cause of this disorder is unknown. It is often triggered by stressful life events involving personal relationships.  What are the signs or symptoms?  Signs and symptoms of conversion disorder may include:  · Muscle weakness or paralysis. If this involves the bladder muscle, it can cause difficulty urinating.  · Seizures or attacks of being unresponsive.  · Abnormal movement, such as tremors, jerks, muscle spasms, loss of balance, or difficulty walking.  · Difficulty swallowing or a feeling of having a lump in the throat.  · Loss of speech (aphonia) or slurring of words (dysarthria).  · Loss of the sensation of touch or pain.  · Changes in vision, such as blindness or double vision.  · Seeing things that are not there (hallucinations).  · Changes in hearing, such as deafness.  Symptoms usually occur suddenly but may increase gradually over time. They usually go away completely in a short time. Seizures and tremors are more likely than other conversion symptoms to come back or continue.  How is this diagnosed?  Diagnosis of this disorder starts with an evaluation by your health care provider. Exams and tests will be done to rule out serious physical health problems. The evaluation will  vary depending on your specific symptoms. It may include:  · A physical exam, including a neurological exam.  · Lab tests on blood or urine samples.  · X-rays or other imaging studies.  · An electroencephalogram (EEG) to monitor brain waves for seizure activity.  Your health care provider may refer you to a mental health specialist for psychological evaluation.  How is this treated?  The main goal of treatment is to get the symptoms to go away as quickly as possible. Most people with this disorder respond well to relaxation techniques and reassurance from their health care provider that the symptoms are stress related. For people with symptoms that do not go away, the following treatment options are available:  · Counseling or talk therapy. Talk therapy is provided by mental health specialists. It involves discussing stressful life events that may have triggered the symptoms and ways to cope with these issues.  · Hypnotherapy. This is the use of hypnosis to help a person overcome conversion symptoms. It is provided by mental health specialists.  · Amobarbital interview. This involves discussing stressful life events that may have triggered the symptoms. The mental health specialist asks you questions after you take a short-acting medicine (amobarbital) that produces a hypnotic state.  · Medicine. Antidepressant medicine may be helpful even if a person with conversion symptoms is not depressed.  · Physical therapy. Physical therapy can help maintain muscle strength in cases of long-term paralysis.  Follow these instructions at home:  · Take medicines only as directed by your health care provider.  · Try to reduce stress.  · Keep all follow-up visits as directed by your health care provider. This is important.  Contact a health care provider if:  · Your symptoms do not go away or they become severe.  · You develop new symptoms.  Get help right away if:  You have serious thoughts about hurting yourself or someone  else.  This information is not intended to replace advice given to you by your health care provider. Make sure you discuss any questions you have with your health care provider.  Document Released: 01/20/2012 Document Revised: 05/25/2017 Document Reviewed: 04/30/2015  © 2017 [employer]

## 2019-07-28 NOTE — PROGRESS NOTES
VSS. A+O x 3 (disoriented to time). RUE, RLE weakness. Blind L eye. Mild headache managed with PRN tylenol. Tolerating diabetic diet. ACHS POC BG monitoring. Ambulated in hallway with FWW and 1 assist. Bed alarm on, call bell within reach.

## 2019-07-28 NOTE — CARE PLAN
Problem: Safety  Goal: Will remain free from falls  Outcome: PROGRESSING AS EXPECTED  Interventions: bed alarm, non skid footwear, call bell within reach, frequent nursing rounds, education r/t fall precautions.     Problem: Infection  Goal: Will remain free from infection  Outcome: PROGRESSING AS EXPECTED  No sings/symptoms of infx. Standard precautions implemented.

## 2019-07-28 NOTE — FACE TO FACE
Face to Face Note  -  Durable Medical Equipment    Aniya Hoyos M.D. - NPI: [id number]  I certify that this patient is under my care and that they had a durable medical equipment(DME)face to face encounter by myself that meets the physician DME face-to-face encounter requirements with this patient on:    Date of encounter:   Patient:                    MRN:                       YOB: 2017  Norm Prabhakar  [id number]  1980     The encounter with the patient was in whole, or in part, for the following medical condition, which is the primary reason for durable medical equipment:  Other - weakness    I certify that, based on my findings, the following durable medical equipment is medically necessary:  Walkers.    HOME O2 Saturation Measurements:(Values must be present for Home Oxygen orders)         ,     ,         My Clinical findings support the need for the above equipment due to:  Abnormal Gait    Supporting Symptoms: exam

## 2019-07-28 NOTE — DISCHARGE PLANNING
Anticipated Discharge Disposition: [facility]    Action: Attempted phone contact with [facility] ([phone]), left  requesting call back to weekend MSW at [phone].    Met with pt at bedside who states OMID Encarnacion has been in communication with  at [facility] (Nabil). Pt states he has keys to get into his apartment. He also has a FWW at bedside.    Spoke with LEON Encarnacion who confirmed he is in communication with Nabil at [facility]. He believes Nabil will send someone to pick pt up. LEON agrees to notify MSW should pt require assistance with transportation home.     Barriers to Discharge: None    Plan: D/C to [facility].

## 2019-07-30 ENCOUNTER — HOME CARE VISIT (OUTPATIENT)
Dept: HOME HEALTH SERVICES | Facility: HOME HEALTHCARE | Age: 39
End: 2019-07-30
Payer: MEDICAID

## 2019-07-30 ENCOUNTER — TELEPHONE (OUTPATIENT)
Dept: VASCULAR LAB | Facility: MEDICAL CENTER | Age: 39
End: 2019-07-30

## 2019-07-30 VITALS
BODY MASS INDEX: 36.4 KG/M2 | SYSTOLIC BLOOD PRESSURE: 108 MMHG | DIASTOLIC BLOOD PRESSURE: 70 MMHG | HEART RATE: 74 BPM | TEMPERATURE: 97.5 F | WEIGHT: 314.6 LBS | RESPIRATION RATE: 18 BRPM | OXYGEN SATURATION: 97 % | HEIGHT: 78 IN

## 2019-07-30 PROCEDURE — 665001 SOC-HOME HEALTH

## 2019-07-30 PROCEDURE — G0493 RN CARE EA 15 MIN HH/HOSPICE: HCPCS

## 2019-07-30 ASSESSMENT — PATIENT HEALTH QUESTIONNAIRE - PHQ9
CLINICAL INTERPRETATION OF PHQ2 SCORE: 2
1. LITTLE INTEREST OR PLEASURE IN DOING THINGS: 00
SUM OF ALL RESPONSES TO PHQ QUESTIONS 1-9: 9
2. FEELING DOWN, DEPRESSED, IRRITABLE, OR HOPELESS: 02
5. POOR APPETITE OR OVEREATING: 0 - NOT AT ALL

## 2019-07-30 ASSESSMENT — ENCOUNTER SYMPTOMS
DEPRESSED MOOD: 1
SHORTNESS OF BREATH: T

## 2019-07-30 ASSESSMENT — ACTIVITIES OF DAILY LIVING (ADL): OASIS_M1830: 03

## 2019-07-30 NOTE — TELEPHONE ENCOUNTER
"Received referral from [facility]. Medications reviewed.     According to the discharge summaries and patient chart during admission, patient should be taking newly increased sertraline 150 mg qday.    [facility] RN reports the following discrepancies from the discharge summary on 7/28/19:   Buspirone 10 mg BID  Divalproex 500 mg BID  Metformin 500 mg BID    Group Home [facility] ([facility]  [phone]) reports that pt should be taking the medications with the following directions:  Buspirone 10 mg TID  Divalproex 500 mg qAM and 1500 mg qPM  Metformin 1000 mg BID    I suspect the discrepancies occurred because during pt's admission, the pharmacy med reconciliation technician updated the patient's med list \"per the group [facility]\". Unclear which MAR was referenced. This updated med list was then used for the discharge summary.     Recommend for pt to follow the most current group [facility].    Attempted to call [facility] regarding discrepancies, but no answer and VM is full.    Justina Barkley, PharmD    "

## 2019-08-02 ENCOUNTER — HOME CARE VISIT (OUTPATIENT)
Dept: HOME HEALTH SERVICES | Facility: HOME HEALTHCARE | Age: 39
End: 2019-08-02
Payer: MEDICAID

## 2019-08-02 VITALS
TEMPERATURE: 97.4 F | RESPIRATION RATE: 17 BRPM | SYSTOLIC BLOOD PRESSURE: 116 MMHG | HEART RATE: 67 BPM | OXYGEN SATURATION: 97 % | DIASTOLIC BLOOD PRESSURE: 78 MMHG

## 2019-08-02 PROCEDURE — G0495 RN CARE TRAIN/EDU IN HH: HCPCS

## 2019-08-02 ASSESSMENT — ENCOUNTER SYMPTOMS
VOMITING: DENIES
NAUSEA: DENIES

## 2019-08-05 ENCOUNTER — HOME CARE VISIT (OUTPATIENT)
Dept: HOME HEALTH SERVICES | Facility: HOME HEALTHCARE | Age: 39
End: 2019-08-05
Payer: MEDICAID

## 2019-08-06 ENCOUNTER — HOME CARE VISIT (OUTPATIENT)
Dept: HOME HEALTH SERVICES | Facility: HOME HEALTHCARE | Age: 39
End: 2019-08-06
Payer: MEDICAID

## 2019-08-06 VITALS
HEART RATE: 72 BPM | DIASTOLIC BLOOD PRESSURE: 74 MMHG | OXYGEN SATURATION: 98 % | RESPIRATION RATE: 17 BRPM | SYSTOLIC BLOOD PRESSURE: 110 MMHG | TEMPERATURE: 97.9 F

## 2019-08-06 PROCEDURE — G0155 HHCP-SVS OF CSW,EA 15 MIN: HCPCS

## 2019-08-06 PROCEDURE — G0495 RN CARE TRAIN/EDU IN HH: HCPCS

## 2019-08-06 ASSESSMENT — ENCOUNTER SYMPTOMS: VOMITING: DENIES

## 2019-08-09 ENCOUNTER — HOME CARE VISIT (OUTPATIENT)
Dept: HOME HEALTH SERVICES | Facility: HOME HEALTHCARE | Age: 39
End: 2019-08-09
Payer: MEDICAID

## 2019-08-09 VITALS
SYSTOLIC BLOOD PRESSURE: 120 MMHG | OXYGEN SATURATION: 98 % | DIASTOLIC BLOOD PRESSURE: 68 MMHG | RESPIRATION RATE: 18 BRPM | TEMPERATURE: 98.2 F | HEART RATE: 66 BPM

## 2019-08-09 VITALS
DIASTOLIC BLOOD PRESSURE: 60 MMHG | HEART RATE: 66 BPM | TEMPERATURE: 97.2 F | OXYGEN SATURATION: 98 % | RESPIRATION RATE: 17 BRPM | SYSTOLIC BLOOD PRESSURE: 100 MMHG

## 2019-08-09 PROCEDURE — G0152 HHCP-SERV OF OT,EA 15 MIN: HCPCS

## 2019-08-09 PROCEDURE — G0299 HHS/HOSPICE OF RN EA 15 MIN: HCPCS

## 2019-08-09 SDOH — ECONOMIC STABILITY: HOUSING INSECURITY: HOME SAFETY: FALL PRECAUTIONS. ITEMS ON FLOOR AND IN WALKING PATH.

## 2019-08-09 ASSESSMENT — ENCOUNTER SYMPTOMS
POOR JUDGMENT: 1
NAUSEA: NO
VOMITING: NO

## 2019-08-09 ASSESSMENT — ACTIVITIES OF DAILY LIVING (ADL)
TOILETING_ASSISTANCE: 0
DRESSING_UB_ASSISTANCE: 0
EATING_ASSISTANCE: 0
ORAL_CARE_ASSISTANCE: 0
DRESSING_LB_ASSISTANCE: 0
BATHING_ASSISTANCE: 0
GROOMING_ASSISTANCE: 0
TELEPHONE_ASSISTANCE: 0

## 2019-08-10 ENCOUNTER — HOME CARE VISIT (OUTPATIENT)
Dept: HOME HEALTH SERVICES | Facility: HOME HEALTHCARE | Age: 39
End: 2019-08-10
Payer: MEDICAID

## 2019-08-10 VITALS
SYSTOLIC BLOOD PRESSURE: 104 MMHG | TEMPERATURE: 97.6 F | RESPIRATION RATE: 18 BRPM | HEART RATE: 68 BPM | DIASTOLIC BLOOD PRESSURE: 62 MMHG | OXYGEN SATURATION: 98 %

## 2019-08-10 PROCEDURE — G0151 HHCP-SERV OF PT,EA 15 MIN: HCPCS

## 2019-08-10 ASSESSMENT — ACTIVITIES OF DAILY LIVING (ADL)
IADLS_COMMENTS: <!--EPICS-->SEE OT EVAL<!--EPICE-->
ADLS_COMMENTS: <!--EPICS-->SEE OT EVAL<!--EPICE-->

## 2019-08-13 ENCOUNTER — HOME CARE VISIT (OUTPATIENT)
Dept: HOME HEALTH SERVICES | Facility: HOME HEALTHCARE | Age: 39
End: 2019-08-13
Payer: MEDICAID

## 2019-08-14 ENCOUNTER — HOME CARE VISIT (OUTPATIENT)
Dept: HOME HEALTH SERVICES | Facility: HOME HEALTHCARE | Age: 39
End: 2019-08-14
Payer: MEDICAID

## 2019-08-14 VITALS
RESPIRATION RATE: 17 BRPM | DIASTOLIC BLOOD PRESSURE: 70 MMHG | OXYGEN SATURATION: 98 % | HEART RATE: 66 BPM | SYSTOLIC BLOOD PRESSURE: 102 MMHG | TEMPERATURE: 97.4 F

## 2019-08-14 PROCEDURE — G0152 HHCP-SERV OF OT,EA 15 MIN: HCPCS

## 2019-08-15 ENCOUNTER — HOME CARE VISIT (OUTPATIENT)
Dept: HOME HEALTH SERVICES | Facility: HOME HEALTHCARE | Age: 39
End: 2019-08-15
Payer: MEDICAID

## 2019-08-15 PROCEDURE — G0299 HHS/HOSPICE OF RN EA 15 MIN: HCPCS

## 2019-08-17 VITALS
HEART RATE: 80 BPM | TEMPERATURE: 99.5 F | OXYGEN SATURATION: 97 % | SYSTOLIC BLOOD PRESSURE: 130 MMHG | RESPIRATION RATE: 18 BRPM | DIASTOLIC BLOOD PRESSURE: 70 MMHG

## 2019-08-17 ASSESSMENT — ENCOUNTER SYMPTOMS
NAUSEA: DENIES
VOMITING: DENIES

## 2019-08-20 ENCOUNTER — HOME CARE VISIT (OUTPATIENT)
Dept: HOME HEALTH SERVICES | Facility: HOME HEALTHCARE | Age: 39
End: 2019-08-20
Payer: MEDICAID

## 2019-08-20 VITALS
TEMPERATURE: 97.6 F | RESPIRATION RATE: 16 BRPM | DIASTOLIC BLOOD PRESSURE: 52 MMHG | OXYGEN SATURATION: 98 % | SYSTOLIC BLOOD PRESSURE: 100 MMHG | HEART RATE: 64 BPM

## 2019-08-20 PROCEDURE — G0157 HHC PT ASSISTANT EA 15: HCPCS

## 2019-08-20 PROCEDURE — G0493 RN CARE EA 15 MIN HH/HOSPICE: HCPCS

## 2019-08-21 ENCOUNTER — HOME CARE VISIT (OUTPATIENT)
Dept: HOME HEALTH SERVICES | Facility: HOME HEALTHCARE | Age: 39
End: 2019-08-21
Payer: MEDICAID

## 2019-08-21 VITALS
RESPIRATION RATE: 16 BRPM | SYSTOLIC BLOOD PRESSURE: 100 MMHG | OXYGEN SATURATION: 98 % | HEART RATE: 64 BPM | DIASTOLIC BLOOD PRESSURE: 52 MMHG | TEMPERATURE: 97.6 F

## 2019-08-21 PROCEDURE — G0152 HHCP-SERV OF OT,EA 15 MIN: HCPCS

## 2019-08-21 ASSESSMENT — ENCOUNTER SYMPTOMS
NAUSEA: DENIES
VOMITING: DENIES

## 2019-08-22 VITALS
TEMPERATURE: 97.9 F | HEART RATE: 74 BPM | RESPIRATION RATE: 16 BRPM | DIASTOLIC BLOOD PRESSURE: 60 MMHG | SYSTOLIC BLOOD PRESSURE: 102 MMHG | OXYGEN SATURATION: 98 %

## 2019-08-22 ASSESSMENT — ACTIVITIES OF DAILY LIVING (ADL)
DRESSING_LB_ASSISTANCE: 0
BATHING_ASSISTANCE: 0
DRESSING_UB_ASSISTANCE: 0
ORAL_CARE_ASSISTANCE: 0
EATING_ASSISTANCE: 0
TOILETING_ASSISTANCE: 0
GROOMING_ASSISTANCE: 0

## 2019-08-23 ENCOUNTER — HOME CARE VISIT (OUTPATIENT)
Dept: HOME HEALTH SERVICES | Facility: HOME HEALTHCARE | Age: 39
End: 2019-08-23
Payer: MEDICAID

## 2019-08-23 VITALS
SYSTOLIC BLOOD PRESSURE: 113 MMHG | TEMPERATURE: 97.5 F | RESPIRATION RATE: 16 BRPM | DIASTOLIC BLOOD PRESSURE: 62 MMHG | OXYGEN SATURATION: 98 % | HEART RATE: 72 BPM

## 2019-08-23 PROCEDURE — G0495 RN CARE TRAIN/EDU IN HH: HCPCS

## 2019-08-23 ASSESSMENT — ENCOUNTER SYMPTOMS
NAUSEA: DENIES
VOMITING: DENIES
POOR JUDGMENT: 1
DIFFICULTY THINKING: 1

## 2019-08-27 ENCOUNTER — HOME CARE VISIT (OUTPATIENT)
Dept: HOME HEALTH SERVICES | Facility: HOME HEALTHCARE | Age: 39
End: 2019-08-27
Payer: MEDICAID

## 2019-08-27 PROCEDURE — G0299 HHS/HOSPICE OF RN EA 15 MIN: HCPCS

## 2019-08-28 ENCOUNTER — HOME CARE VISIT (OUTPATIENT)
Dept: HOME HEALTH SERVICES | Facility: HOME HEALTHCARE | Age: 39
End: 2019-08-28
Payer: MEDICAID

## 2019-08-29 ENCOUNTER — HOME CARE VISIT (OUTPATIENT)
Dept: HOME HEALTH SERVICES | Facility: HOME HEALTHCARE | Age: 39
End: 2019-08-29
Payer: MEDICAID

## 2019-08-29 VITALS
HEART RATE: 80 BPM | TEMPERATURE: 97.6 F | DIASTOLIC BLOOD PRESSURE: 68 MMHG | RESPIRATION RATE: 18 BRPM | SYSTOLIC BLOOD PRESSURE: 118 MMHG | OXYGEN SATURATION: 98 %

## 2019-08-29 PROCEDURE — G0151 HHCP-SERV OF PT,EA 15 MIN: HCPCS

## 2019-08-30 ENCOUNTER — HOME CARE VISIT (OUTPATIENT)
Dept: HOME HEALTH SERVICES | Facility: HOME HEALTHCARE | Age: 39
End: 2019-08-30
Payer: MEDICAID

## 2019-08-30 VITALS
RESPIRATION RATE: 16 BRPM | DIASTOLIC BLOOD PRESSURE: 76 MMHG | HEART RATE: 76 BPM | SYSTOLIC BLOOD PRESSURE: 110 MMHG | TEMPERATURE: 98.6 F | OXYGEN SATURATION: 98 %

## 2019-08-30 ASSESSMENT — ENCOUNTER SYMPTOMS
NAUSEA: DENIES
VOMITING: DENIES

## 2019-09-03 ENCOUNTER — HOME CARE VISIT (OUTPATIENT)
Dept: HOME HEALTH SERVICES | Facility: HOME HEALTHCARE | Age: 39
End: 2019-09-03
Payer: MEDICAID

## 2019-09-03 VITALS
RESPIRATION RATE: 18 BRPM | SYSTOLIC BLOOD PRESSURE: 110 MMHG | TEMPERATURE: 98 F | OXYGEN SATURATION: 98 % | HEART RATE: 76 BPM | DIASTOLIC BLOOD PRESSURE: 70 MMHG

## 2019-09-03 PROCEDURE — G0299 HHS/HOSPICE OF RN EA 15 MIN: HCPCS

## 2019-09-03 ASSESSMENT — ENCOUNTER SYMPTOMS
NAUSEA: DENIES
VOMITING: DENIES

## 2019-09-10 ENCOUNTER — HOME CARE VISIT (OUTPATIENT)
Dept: HOME HEALTH SERVICES | Facility: HOME HEALTHCARE | Age: 39
End: 2019-09-10
Payer: MEDICAID

## 2019-09-10 VITALS
TEMPERATURE: 98 F | OXYGEN SATURATION: 97 % | RESPIRATION RATE: 18 BRPM | HEART RATE: 68 BPM | DIASTOLIC BLOOD PRESSURE: 60 MMHG | SYSTOLIC BLOOD PRESSURE: 100 MMHG

## 2019-09-10 PROCEDURE — G0299 HHS/HOSPICE OF RN EA 15 MIN: HCPCS

## 2019-09-10 ASSESSMENT — ENCOUNTER SYMPTOMS
VOMITING: DENIES
NAUSEA: DENIES

## 2019-09-18 ENCOUNTER — HOME CARE VISIT (OUTPATIENT)
Dept: HOME HEALTH SERVICES | Facility: HOME HEALTHCARE | Age: 39
End: 2019-09-18
Payer: MEDICAID

## 2019-09-18 VITALS
TEMPERATURE: 98 F | HEART RATE: 78 BPM | RESPIRATION RATE: 18 BRPM | DIASTOLIC BLOOD PRESSURE: 89 MMHG | SYSTOLIC BLOOD PRESSURE: 120 MMHG | OXYGEN SATURATION: 96 %

## 2019-09-18 PROCEDURE — G0299 HHS/HOSPICE OF RN EA 15 MIN: HCPCS

## 2019-09-18 ASSESSMENT — ENCOUNTER SYMPTOMS
NAUSEA: DENIES
VOMITING: DENIES

## 2019-09-25 ENCOUNTER — HOME CARE VISIT (OUTPATIENT)
Dept: HOME HEALTH SERVICES | Facility: HOME HEALTHCARE | Age: 39
End: 2019-09-25
Payer: MEDICAID

## 2019-09-25 VITALS
OXYGEN SATURATION: 98 % | SYSTOLIC BLOOD PRESSURE: 120 MMHG | HEART RATE: 78 BPM | TEMPERATURE: 98.7 F | DIASTOLIC BLOOD PRESSURE: 80 MMHG | RESPIRATION RATE: 18 BRPM

## 2019-09-25 PROCEDURE — G0493 RN CARE EA 15 MIN HH/HOSPICE: HCPCS

## 2019-09-25 ASSESSMENT — ACTIVITIES OF DAILY LIVING (ADL)
HOME_HEALTH_OASIS: 00
OASIS_M1830: 00

## 2019-09-25 ASSESSMENT — PATIENT HEALTH QUESTIONNAIRE - PHQ9: CLINICAL INTERPRETATION OF PHQ2 SCORE: 0

## 2019-10-10 ENCOUNTER — APPOINTMENT (OUTPATIENT)
Dept: RADIOLOGY | Facility: MEDICAL CENTER | Age: 39
End: 2019-10-10
Attending: STUDENT IN AN ORGANIZED HEALTH CARE EDUCATION/TRAINING PROGRAM
Payer: MEDICAID

## 2019-10-10 ENCOUNTER — HOSPITAL ENCOUNTER (EMERGENCY)
Facility: MEDICAL CENTER | Age: 39
End: 2019-10-11
Attending: EMERGENCY MEDICINE
Payer: MEDICAID

## 2019-10-10 DIAGNOSIS — R56.9 SEIZURE (HCC): ICD-10-CM

## 2019-10-10 LAB
ALBUMIN SERPL BCP-MCNC: 4.3 G/DL (ref 3.2–4.9)
ALBUMIN/GLOB SERPL: 2 G/DL
ALP SERPL-CCNC: 57 U/L (ref 30–99)
ALT SERPL-CCNC: 9 U/L (ref 2–50)
ANION GAP SERPL CALC-SCNC: 13 MMOL/L (ref 0–11.9)
AST SERPL-CCNC: 11 U/L (ref 12–45)
BASOPHILS # BLD AUTO: 0.8 % (ref 0–1.8)
BASOPHILS # BLD: 0.07 K/UL (ref 0–0.12)
BILIRUB SERPL-MCNC: 0.3 MG/DL (ref 0.1–1.5)
BUN SERPL-MCNC: 17 MG/DL (ref 8–22)
CALCIUM SERPL-MCNC: 9.4 MG/DL (ref 8.5–10.5)
CHLORIDE SERPL-SCNC: 99 MMOL/L (ref 96–112)
CO2 SERPL-SCNC: 22 MMOL/L (ref 20–33)
CREAT SERPL-MCNC: 0.84 MG/DL (ref 0.5–1.4)
EKG IMPRESSION: NORMAL
EOSINOPHIL # BLD AUTO: 0.14 K/UL (ref 0–0.51)
EOSINOPHIL NFR BLD: 1.6 % (ref 0–6.9)
ERYTHROCYTE [DISTWIDTH] IN BLOOD BY AUTOMATED COUNT: 41.6 FL (ref 35.9–50)
GLOBULIN SER CALC-MCNC: 2.2 G/DL (ref 1.9–3.5)
GLUCOSE SERPL-MCNC: 167 MG/DL (ref 65–99)
HCT VFR BLD AUTO: 39.8 % (ref 42–52)
HGB BLD-MCNC: 12.9 G/DL (ref 14–18)
IMM GRANULOCYTES # BLD AUTO: 0.06 K/UL (ref 0–0.11)
IMM GRANULOCYTES NFR BLD AUTO: 0.7 % (ref 0–0.9)
LYMPHOCYTES # BLD AUTO: 3.61 K/UL (ref 1–4.8)
LYMPHOCYTES NFR BLD: 40.9 % (ref 22–41)
MCH RBC QN AUTO: 28.9 PG (ref 27–33)
MCHC RBC AUTO-ENTMCNC: 32.4 G/DL (ref 33.7–35.3)
MCV RBC AUTO: 89.2 FL (ref 81.4–97.8)
MONOCYTES # BLD AUTO: 0.8 K/UL (ref 0–0.85)
MONOCYTES NFR BLD AUTO: 9.1 % (ref 0–13.4)
NEUTROPHILS # BLD AUTO: 4.14 K/UL (ref 1.82–7.42)
NEUTROPHILS NFR BLD: 46.9 % (ref 44–72)
NRBC # BLD AUTO: 0 K/UL
NRBC BLD-RTO: 0 /100 WBC
PLATELET # BLD AUTO: 277 K/UL (ref 164–446)
PMV BLD AUTO: 10.6 FL (ref 9–12.9)
POTASSIUM SERPL-SCNC: 4 MMOL/L (ref 3.6–5.5)
PROLACTIN SERPL-MCNC: 79.79 NG/ML (ref 2.1–17.7)
PROT SERPL-MCNC: 6.5 G/DL (ref 6–8.2)
RBC # BLD AUTO: 4.46 M/UL (ref 4.7–6.1)
SODIUM SERPL-SCNC: 134 MMOL/L (ref 135–145)
WBC # BLD AUTO: 8.8 K/UL (ref 4.8–10.8)

## 2019-10-10 PROCEDURE — 84146 ASSAY OF PROLACTIN: CPT

## 2019-10-10 PROCEDURE — 700105 HCHG RX REV CODE 258: Performed by: STUDENT IN AN ORGANIZED HEALTH CARE EDUCATION/TRAINING PROGRAM

## 2019-10-10 PROCEDURE — 70450 CT HEAD/BRAIN W/O DYE: CPT

## 2019-10-10 PROCEDURE — 99285 EMERGENCY DEPT VISIT HI MDM: CPT

## 2019-10-10 PROCEDURE — 85025 COMPLETE CBC W/AUTO DIFF WBC: CPT

## 2019-10-10 PROCEDURE — 81003 URINALYSIS AUTO W/O SCOPE: CPT

## 2019-10-10 PROCEDURE — 71045 X-RAY EXAM CHEST 1 VIEW: CPT

## 2019-10-10 PROCEDURE — 80053 COMPREHEN METABOLIC PANEL: CPT

## 2019-10-10 PROCEDURE — 80164 ASSAY DIPROPYLACETIC ACD TOT: CPT

## 2019-10-10 PROCEDURE — 93005 ELECTROCARDIOGRAM TRACING: CPT

## 2019-10-10 RX ORDER — SODIUM CHLORIDE 9 MG/ML
1000 INJECTION, SOLUTION INTRAVENOUS ONCE
Status: COMPLETED | OUTPATIENT
Start: 2019-10-10 | End: 2019-10-10

## 2019-10-10 RX ADMIN — SODIUM CHLORIDE 1000 ML: 9 INJECTION, SOLUTION INTRAVENOUS at 22:40

## 2019-10-10 ASSESSMENT — LIFESTYLE VARIABLES
EVER FELT BAD OR GUILTY ABOUT YOUR DRINKING: NO
DO YOU DRINK ALCOHOL: NO
HAVE PEOPLE ANNOYED YOU BY CRITICIZING YOUR DRINKING: NO
TOTAL SCORE: 0
CONSUMPTION TOTAL: INCOMPLETE
TOTAL SCORE: 0
EVER HAD A DRINK FIRST THING IN THE MORNING TO STEADY YOUR NERVES TO GET RID OF A HANGOVER: NO
TOTAL SCORE: 0
HAVE YOU EVER FELT YOU SHOULD CUT DOWN ON YOUR DRINKING: NO
DOES PATIENT WANT TO STOP DRINKING: NO

## 2019-10-11 VITALS
DIASTOLIC BLOOD PRESSURE: 56 MMHG | HEIGHT: 78 IN | HEART RATE: 72 BPM | WEIGHT: 308 LBS | BODY MASS INDEX: 35.64 KG/M2 | TEMPERATURE: 97.5 F | RESPIRATION RATE: 16 BRPM | SYSTOLIC BLOOD PRESSURE: 102 MMHG | OXYGEN SATURATION: 95 %

## 2019-10-11 LAB
APPEARANCE UR: CLEAR
BILIRUB UR QL STRIP.AUTO: NEGATIVE
COLOR UR: YELLOW
GLUCOSE UR STRIP.AUTO-MCNC: NEGATIVE MG/DL
KETONES UR STRIP.AUTO-MCNC: 15 MG/DL
LEUKOCYTE ESTERASE UR QL STRIP.AUTO: NEGATIVE
MICRO URNS: ABNORMAL
NITRITE UR QL STRIP.AUTO: NEGATIVE
PH UR STRIP.AUTO: 5 [PH] (ref 5–8)
PROT UR QL STRIP: NEGATIVE MG/DL
RBC UR QL AUTO: NEGATIVE
SP GR UR STRIP.AUTO: 1.02
UROBILINOGEN UR STRIP.AUTO-MCNC: 0.2 MG/DL
VALPROATE SERPL-MCNC: 57.3 UG/ML (ref 50–100)

## 2019-10-11 NOTE — PROGRESS NOTES
CHIEF COMPLAINT  Chief Complaint   Patient presents with   • Seizure     Three total 10/10/19, possible head trauma       HPI  Norm Prabhakar is a 39 y.o. Male with a past medical history of seizure disorder since age 20, diabetes, possible CVA, mood disorder who presents to the emergency department cavity.  Patient states that he had 3 episodes of seizure like activity today.  States he had 2 episodes this morning while he was in the park playing basketball with his friend.  States that he began to shake and lost consciousness.  States when he woke up he was lying on the ground and his friend stated that he was having shaking of his extremities.  States that on 1 of these occasions he did void his bladder.  States that these episodes he went home.  States that at home he had another episode of possible seizure where he began to shake and felt like a dark cloud came over his visual fields.  States that he woke up on the floor.  States that he has been taking his valproic acid as prescribed, 1 tablet in the morning and 3 tablets at night.  States that he does not have a neurologist at this time as he missed his last appointment.  Patient denies any recent increase in stress or caffeine use.  States that he did switch his work schedule and is currently working nights leading to a decrease amount of sleep.      REVIEW OF SYSTEMS  Positives as above. 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   • Hypothyroidism    • Indigestion     once in a while   • Mental disorder     learning disabilities; speech impairment; developmental delays   • Murmur     since birth   • Pneumonia     remote   • Psychiatric problem 2002     PTSD   • S/P thyroidectomy    • Seizure (HCC)    • Seizure disorder (HCC)    • Unspecified disorder of thyroid        FAMILY HISTORY  Noncontributory    SOCIAL HISTORY  Social History     Socioeconomic History   • Marital status: Single     Spouse name: Not on file   • Number of children: Not on file   • Years of education: Not on file   • Highest education level: Not on file   Occupational History   • Not on file   Social Needs   • Financial resource strain: Not on file   • Food insecurity:     Worry: Not on file     Inability: Not on file   • Transportation needs:     Medical: Not on file     Non-medical: Not on file   Tobacco Use   • Smoking status: Former Smoker     Packs/day: 0.25     Types: Cigarettes, Cigars     Last attempt to quit: 2018     Years since quittin.4   • Smokeless tobacco: Never Used   Substance and Sexual Activity   • Alcohol use: No   • Drug use: Yes     Types: Inhaled     Comment: marijuana   • Sexual activity: Not on file   Lifestyle   • Physical activity:     Days per week: Not on file     Minutes per session: Not on file   • Stress: Not on file   Relationships   • Social connections:     Talks on phone: Not on file     Gets together: Not on file     Attends Mandaeism service: Not on file     Active member of club or organization: Not on file     Attends meetings of clubs or organizations: Not on file     Relationship status: Not on file   • Intimate partner violence:     Fear of current or ex partner: Not on file     Emotionally abused: Not on file     Physically abused: Not on file     Forced sexual activity: Not on file   Other Topics Concern   • Not on file   Social History Narrative   • Not on file       SURGICAL HISTORY  Past Surgical History:   Procedure Laterality Date   • EYE SURGERY     • OTHER      Hernia Repair when he was 10 yrs old   • THYROID LOBECTOMY         CURRENT MEDICATIONS  Home Medications     Reviewed by Wilber Rocha R.N. (Registered Nurse) on 10/10/19  "at 2221  Med List Status: Partial   Medication Last Dose Status   aspirin EC (ECOTRIN) 81 MG Tablet Delayed Response  Active   atorvastatin (LIPITOR) 80 MG tablet  Active   busPIRone (BUSPAR) 10 MG Tab tablet  Active   divalproex (DEPAKOTE) 500 MG Tablet Delayed Response  Active   glimepiride (AMARYL) 4 MG Tab  Active   ibuprofen (MOTRIN) 200 MG Tab  Active   insulin glargine (BASAGLAR KWIKPEN) 100 UNIT/ML Solution Pen-injector injection  Active   levothyroxine (SYNTHROID) 150 MCG Tab  Active   lidocaine (XYLOCAINE) 5 % Ointment  Active   lurasidone (LATUDA) 20 MG Tab  Active   metFORMIN (GLUCOPHAGE) 500 MG Tab  Active   montelukast (SINGULAIR) 10 MG Tab  Active   nystatin (MYCOSTATIN) 545048 UNIT/GM Cream topical cream  Active   prazosin (MINIPRESS) 2 MG Cap  Active   raNITidine (ZANTAC) 150 MG Tab  Active   sertraline (ZOLOFT) 100 MG Tab  Active                ALLERGIES  Allergies   Allergen Reactions   • Abilify Unspecified     \"Feeling tired, like I don't even know whats going on around me\"   • Fish      Pt reports fish causes him to be sick to his stomach         PHYSICAL EXAM  VITAL SIGNS: /56   Pulse 72   Temp 36.4 °C (97.5 °F) (Temporal)   Resp 16   Ht 1.981 m (6' 6\")   Wt (!) 139.7 kg (308 lb)   SpO2 95%   BMI 35.59 kg/m²      Constitutional: Well developed, No acute distress, Non-toxic appearance  Eyes: Right eye PERRLA, left eye with scar, nonreactive to light, EOMI, Conjunctiva normal, No discharge  HEENT: Normocephalic, atraumatic, neck supple, dry mucous membranes  Cardiovascular: Normal heart rate, Normal rhythm, No murmurs, No rubs, No gallops, and intact distal pulses.   Thorax & Lungs:  No respiratory distress, no rales, no rhonchi, No wheezing, No chest wall tenderness.   Abdomen: Bowel sounds normal, Soft, No tenderness, No guarding, No rebound, No pulsatile masses.   Skin: Warm, Dry, No erythema, No rash.   Extremities: Full range of motion, no deformity, no edema.  Neurologic: " Alert & oriented x 3, decreased sensation right side of face, decreased muscle strength right facial muscles, 4/5 right upper extremity muscle strength, 5/5 left upper extremity strength  Psychiatric: Affect normal for clinical presentation.      RADIOLOGY/PROCEDURES  DX-CHEST-PORTABLE (1 VIEW)   Final Result         1.  Bilateral basilar atelectasis, no focal infiltrate      CT-HEAD W/O    (Results Pending)         COURSE & MEDICAL DECISION MAKING  Pertinent Labs & Imaging studies reviewed. (See chart for details)    39-year-old male with history of seizure disorder who presents today for 3 episodes of seizure activity.  Denies any recent trauma.  Currently on Depakote, states he is compliant with medications.  Has not seen a neurologist recently.  Vital signs stable on exam as patient is afebrile and normotensive. Labs showed normal white blood cell count, anemia to 12.9, relatively normal electrolytes, creatinine 0.8.  Prolactin is elevated at 79.  Valproic acid level was found to be 57, in the therapeutic range.  UA showed evidence of ketones but was overall negative.  Chest x-ray showed no acute process.  CT head was obtained which showed no acute process.     Patient was given IV hydration for appearance of dehydration.  P.o. intake of fluids will be inadequate for this.  Patients volume status improved after receiving IV fluid hydration.    Given complicated history as well as therapeutic level of valproic acid, it  was opted to not add a second antiepileptic at this time. Recent seizures possibly due to change in sleep schedule.  Educated patient to follow-up with neurologist as outpatient for possible medication changes.  Patient is educated to return to the ER if symptoms worsen or if he experiences another seizure.         Discharge Medication List as of 10/11/2019  1:53 AM          FINAL IMPRESSION     1. Seizure (HCC) Active         Electronically signed by: Deon Alexander, 10/10/2019 1:48 AM

## 2019-10-11 NOTE — ED TRIAGE NOTES
Chief Complaint   Patient presents with   • Seizure     Three total 10/10/19, possible head trauma

## 2019-10-11 NOTE — ED NOTES
Patient verbalized understanding of discharge instructions, provided with discharge paperwork, gait steady, ambulated independently to [facility].

## 2019-10-11 NOTE — ED NOTES
Patient awake alert and oriented x 4, Glascow 15, bed in low position, call light within reach, on room air, attached to cardiac monitor, unlabored breathing noted, no cough noted, interacts with staff, interactions noted as appropriate, speaking with emergency room provider at this time.

## 2019-10-11 NOTE — ED PROVIDER NOTES
ED Provider Note    Patient's ED encounter documented under progress note by resident physician Dr. Alexander.  Please see that note and my attestation.

## 2019-11-30 ENCOUNTER — HOSPITAL ENCOUNTER (EMERGENCY)
Facility: MEDICAL CENTER | Age: 39
End: 2019-11-30
Attending: EMERGENCY MEDICINE
Payer: COMMERCIAL

## 2019-11-30 ENCOUNTER — APPOINTMENT (OUTPATIENT)
Dept: RADIOLOGY | Facility: MEDICAL CENTER | Age: 39
End: 2019-11-30
Attending: EMERGENCY MEDICINE
Payer: COMMERCIAL

## 2019-11-30 VITALS
OXYGEN SATURATION: 99 % | SYSTOLIC BLOOD PRESSURE: 126 MMHG | RESPIRATION RATE: 16 BRPM | DIASTOLIC BLOOD PRESSURE: 85 MMHG | TEMPERATURE: 97.2 F | BODY MASS INDEX: 35.59 KG/M2 | HEART RATE: 72 BPM | WEIGHT: 308 LBS

## 2019-11-30 DIAGNOSIS — M25.572 ACUTE LEFT ANKLE PAIN: ICD-10-CM

## 2019-11-30 PROCEDURE — 73610 X-RAY EXAM OF ANKLE: CPT | Mod: LT

## 2019-11-30 PROCEDURE — 99284 EMERGENCY DEPT VISIT MOD MDM: CPT

## 2019-11-30 PROCEDURE — 302875 HCHG BANDAGE ACE 4 OR 6""

## 2019-11-30 NOTE — LETTER
FORM C-4:  EMPLOYEE’S CLAIM FOR COMPENSATION/ REPORT OF INITIAL TREATMENT  EMPLOYEE’S CLAIM - PROVIDE ALL INFORMATION REQUESTED   First Name Norm Last Name Aki Birthdate 1980  Sex male Claim Number   Home Employee Address [address] [place] [address]                                     Zip  [address] Height    Weight  (!) 139.7 kg (308 lb) N  [phone]   Mailing Employee Address [address] [place] [address]               Zip  [address] Telephone  [phone] (home)  Primary Language Spoken   Insurer   Third Party   [place] Employee's Occupation (Job Title) When Injury or Occupational Disease Occurred     Employer's Name [employer] Telephone [phone]    Employer Address [address]] Zip [address]   Date of Injury  11/29/2019       Hour of Injury  7:30 PM Date Employer Notified  11/29/2019 Last Day of Work after Injury or Occupational Disease  11/29/2019 Supervisor to Whom Injury Reported  Jade/Adrian   Address or Location of Accident (if applicable) [[address]]   What were you doing at the time of accident? (if applicable) Doing my required position, picking    How did this injury or occupational disease occur? Be specific and answer in detail. Use additional sheet if necessary)  I was walking down an aisle. My shoelace was untied. I stepped on the shoelace and rolled my ankle.   If you believe that you have an occupational disease, when did you first have knowledge of the disability and it relationship to your employment? N/A Witnesses to the Accident  None   Nature of Injury or Occupational Disease  Workers' Compensation Part(s) of Body Injured or Affected  Ankle (L), N/A, N/A    I CERTIFY THAT THE ABOVE IS TRUE AND CORRECT TO THE BEST OF MY KNOWLEDGE AND THAT I HAVE PROVIDED THIS INFORMATION IN ORDER TO OBTAIN THE BENEFITS OF [place]).  I HEREBY AUTHORIZE ANY PHYSICIAN, CHIROPRACTOR, SURGEON, PRACTITIONER, OR OTHER PERSON, ANY HOSPITAL, INCLUDING [facility] OR [facility] H[facility]ITAL, ANY MEDICAL SERVICE ORGANIZATION, ANY INSURANCE COMPANY, OR OTHER INSTITUTION OR ORGANIZATION TO RELEASE TO EACH OTHER, ANY MEDICAL OR OTHER INFORMATION, INCLUDING BENEFITS PAID OR PAYABLE, PERTINENT TO THIS INJURY OR DISEASE, EXCEPT INFORMATION RELATIVE TO DIAGNOSIS, TREATMENT AND/OR COUNSELING FOR AIDS, PSYCHOLOGICAL CONDITIONS, ALCOHOL OR CONTROLLED SUBSTANCES, FOR WHICH I MUST GIVE SPECIFIC AUTHORIZATION.  A PHOTOSTAT OF THIS AUTHORIZATION SHALL BE AS VALID AS THE ORIGINAL.  Date                                      Place                [facility]                                                             Employee’s Signature   THIS REPORT MUST BE COMPLETED AND MAILED WITHIN 3 WORKING DAYS OF TREATMENT   Place [facility], EMERGENCY DEPT                                                                             Name of Facility [facility]   Date  11/30/2019 Diagnosis  (M25.572) Acute left ankle pain Is there evidence the injured employee was under the influence of alcohol and/or another controlled substance at the time of accident?   Hour  3:40 PM Description of Injury or Disease  Acute left ankle pain     Treatment     Have you advised the patient to remain off work five days or more?             X-Ray Findings    If Yes   From Date    To Date      From information given by the employee, together with medical evidence, can you directly connect this injury or occupational disease as job incurred?   If No, is employee capable of: Full Duty    Modified Duty      Is additional medical care by a physician indicated?   If Modified Duty, Specify any Limitations / Restrictions       Do you know of any previous injury or disease contributing to this condition or occupational  "disease?      Date 12/4/2019 Print Doctor’s Name Pieter Dean I certify the employer’s copy of this form was mailed on:   Address [address] [place] [id number] [phone] INSURER’S USE ONLY   Provider’s Tax ID Number [id number] Telephone Dept: [phone]    Doctor’s Signature   Degree        Form C-4 (rev.10/07)                                                                         BRIEF DESCRIPTION OF RIGHTS AND BENEFITS  (Pursuant to [place] 616C.050)    Notice of Injury or Occupational Disease (Incident Report Form C-1): If an injury or occupational disease (OD) arises out of and in the course of employment, you must provide written notice to your employer as soon as practicable, but no later than 7 days after the accident or OD. Your employer shall maintain a sufficient supply of the required forms.    Claim for Compensation (Form C-4): If medical treatment is sought, the form C-4 is available at the place of initial treatment. A completed \"Claim for Compensation\" (Form C-4) must be filed within 90 days after an accident or OD. The treating physician or chiropractor must, within 3 working days after treatment, complete and mail to the employer, the employer's insurer and third-party , the Claim for Compensation.    Medical Treatment: If you require medical treatment for your on-the-job injury or OD, you may be required to select a physician or chiropractor from a list provided by your workers’ compensation insurer, if it has contracted with an Organization for Managed Care (MCO) or Preferred Provider Organization (PPO) or providers of health care. If your employer has not entered into a contract with an MCO or PPO, you may select a physician or chiropractor from the Panel of Physicians and Chiropractors. Any medical costs related to your industrial injury or OD will be paid by your insurer.    Temporary Total Disability (TTD): If your doctor has certified that you are unable to work for " a period of at least 5 consecutive days, or 5 cumulative days in a 20-day period, or places restrictions on you that your employer does not accommodate, you may be entitled to TTD compensation.    Temporary Partial Disability (TPD): If the wage you receive upon reemployment is less than the compensation for TTD to which you are entitled, the insurer may be required to pay you TPD compensation to make up the difference. TPD can only be paid for a maximum of 24 months.    Permanent Partial Disability (PPD): When your medical condition is stable and there is an indication of a PPD as a result of your injury or OD, within 30 days, your insurer must arrange for an evaluation by a rating physician or chiropractor to determine the degree of your PPD. The amount of your PPD award depends on the date of injury, the results of the PPD evaluation and your age and wage.    Permanent Total Disability (PTD): If you are medically certified by a treating physician or chiropractor as permanently and totally disabled and have been granted a PTD status by your insurer, you are entitled to receive monthly benefits not to exceed 66 2/3% of your average monthly wage. The amount of your PTD payments is subject to reduction if you previously received a PPD award.    Vocational Rehabilitation Services: You may be eligible for vocational rehabilitation services if you are unable to return to the job due to a permanent physical impairment or permanent restrictions as a result of your injury or occupational disease.    Transportation and Per Stephon Reimbursement: You may be eligible for travel expenses and per stephon associated with medical treatment.    Reopening: You may be able to reopen your claim if your condition worsens after claim closure.     Appeal Process: If you disagree with a written determination issued by the insurer or the insurer does not respond to your request, you may appeal to the Department of Administration, Hearing  Officer, by following the instructions contained in your determination letter. You must appeal the determination within 70 days from the date of the determination letter at [address], [address], [place], [place] [address], or [address], [address], [place], [place] [address]. If you disagree with the  decision, you may appeal to the Department of Administration, . You must file your appeal within 30 days from the date of the  decision letter at [address], [address], [place], [place] [address], or [address], [address], [place], [place] [address]. If you disagree with a decision of an , you may file a petition for judicial review with the District Court. You must do so within 30 days of the Appeal Officer’s decision. You may be represented by an  at your own expense or you may contact the [facility] for possible representation.    [place]  for Injured Workers (NAIW): If you disagree with a  decision, you may request that NAIW represent you without charge at an  Hearing. For information regarding denial of benefits, you may contact the [facility] at: [address], [address], [place] [address], [phone], or [address], [address], [place] [address], [phone]    To File a Complaint with the Division: If you wish to file a complaint with the  of the Division of Industrial Relations (DIR),  please contact the Workers’ Compensation Section, [address], [address], [place], [place] [address], telephone [phone], or [address], [address], [place], [place] [address], telephone [phone].    For assistance with Workers’ Compensation Issues: You may contact the Office of the Governor Consumer Health Assistance, [address], [address], [place], [place] [address], Toll Free 1-978.638.6321, Web site:  [email or web address] [email or web address]., E-mail [email or web address] [email or web address].  D-2 (rev. 06/18)              __________________________________________________________________                                    _________________            Employee Name / Signature                                                                                                                            Date

## 2019-12-01 NOTE — ED PROVIDER NOTES
"ED Provider Note    CHIEF COMPLAINT  Chief Complaint   Patient presents with   • T-5000 Ankle Injury   • T-5000 GLF       HPI  Norm Prabhakar is a 39 y.o. male who presents for evaluation of left ankle pain.  Patient states he \"rolled it\" at work yesterday and then as he was walking to the [employer] game today, he said he rolled it again.  Patient approached ambulance at the game and asked to be transported here.  Patient has no knee or hip pain and no back pain.  He has no other injuries    REVIEW OF SYSTEMS  GEN: No fevers or chills  SKIN: No rashes, abrasions, or laceration  MS: Left ankle pain.  No swelling, no deformity  NEURO: No sensory or motor changes to affected extremity          PAST MEDICAL HISTORY   has a past medical history of Anxiety, ASTHMA, Bipolar 1 disorder (), Depression, Fall, Glaucoma, Glaucoma (), Hypothyroidism, Indigestion, Mental disorder, Murmur, Pneumonia, Psychiatric problem (), S/P thyroidectomy, Seizure (HCC) (), Se[facility]ure disorder ([facility]), and Unspecified disorder of thyroid.    SOCIAL HISTORY  Social History     Tobacco Use   • Smoking status: Former Smoker     Packs/day: 0.25     Types: Cigarettes, Cigars     Last attempt to quit: 2018     Years since quittin.5   • Smokeless tobacco: Never Used   Substance and Sexual Activity   • Alcohol use: No   • Drug use: Yes     Types: Inhaled     Comment: marijuana   • Sexual activity: Not on file       SURGICAL HISTORY   has a past surgical history that includes eye surgery; thyroid lobectomy; and other.    CURRENT MEDICATIONS  Home Medications    **Home medications have not yet been reviewed for this encounter**         ALLERGIES  Allergies   Allergen Reactions   • Abilify Unspecified     \"Feeling tired, like I don't even know whats going on around me\"   • Fish      Pt reports fish causes him to be sick to his stomach         PHYSICAL EXAM  VITAL SIGNS: /85   Pulse 72   Temp 36.2 °C (97.2 °F) (Temporal)   Resp " 16   Wt (!) 139.7 kg (308 lb)   SpO2 99%   BMI 35.59 kg/m²    GEN: Alert in no apparent distress.  SKIN: Warm, Dry, No erythema, No rash, no ecchymosis, no erythema, no lacerations, no abrasions to affected foot and ankle  MS: Mild tenderness to the left ankle just anterior and superior to the lateral malleolus.  There is no swelling, erythema, ecchymosis, abrasions, or lacerations noted.  Feet are warm and dry and capillary refill is normal to the affected extremity.  There is no pain with range of motion of the knee or hip on affected side.  Is no tenderness to the fibular head.    RADIOLOGY  DX-ANKLE 3+ VIEWS LEFT   Final Result      No evidence of acute fracture or dislocation.            COURSE & MEDICAL DECISION MAKING  Pertinent Labs & Imaging studies reviewed. (See chart for details)  Patient arrives for evaluation of left ankle pain after apparently rolling it twice in 2 days.  Patient has no evidence for fracture or dislocation and does not have any external evidence to suggest a severe soft tissue injury.  I do not feel further imaging or laboratory evaluation will benefit patient or  and I feel he will recover given time and conservative, symptomatic treatment at home.  He will be given an Ace wrap and crutches and informed that he can take Tylenol and Motrin for the pain.  He will continue to ice and elevate as much as possible for the next 48 hours and then use a heating pad as necessary.  He will be encouraged to weight-bear as tolerated.  He will follow-up with the orthopedic surgeon or his primary care physician if symptoms persist.  FINAL IMPRESSION  1. Acute left ankle pain             Electronically signed by: Pieter Dean, 11/30/2019 5:36 PM

## 2019-12-01 NOTE — ED NOTES
Ace wrap applied, crutch instructions given, pt able to teach back instructions. RN educated Pt on pain management techniques (RICE).

## 2019-12-01 NOTE — ED NOTES
Pt MAGNO VIZCARRA, pt was at the [employer] game, walked to the stadium from his apartment, approached ZACKERY at the game and c/o left ankle pain s/p MGLF yesterday, C/o continued worsening/ pain today

## 2019-12-10 ENCOUNTER — OCCUPATIONAL MEDICINE (OUTPATIENT)
Dept: URGENT CARE | Facility: CLINIC | Age: 39
End: 2019-12-10
Payer: MEDICAID

## 2019-12-10 VITALS
SYSTOLIC BLOOD PRESSURE: 118 MMHG | OXYGEN SATURATION: 95 % | HEART RATE: 82 BPM | RESPIRATION RATE: 18 BRPM | DIASTOLIC BLOOD PRESSURE: 68 MMHG | TEMPERATURE: 98.6 F

## 2019-12-10 DIAGNOSIS — S93.402A SPRAIN OF LEFT ANKLE, UNSPECIFIED LIGAMENT, INITIAL ENCOUNTER: ICD-10-CM

## 2019-12-10 PROCEDURE — 99213 OFFICE O/P EST LOW 20 MIN: CPT | Performed by: PHYSICIAN ASSISTANT

## 2019-12-10 ASSESSMENT — ENCOUNTER SYMPTOMS
ABDOMINAL PAIN: 0
CHILLS: 0
FEVER: 0
VOMITING: 0
DIARRHEA: 0
SHORTNESS OF BREATH: 0
DIZZINESS: 0
NAUSEA: 0

## 2019-12-10 NOTE — LETTER
"   [facility]  [address] [place] [address]  Phone:  [phone] - Fax:  [phone]   [facility] Progress Report and Disability Certification  Date of Service: 12/10/2019   No Show:  No  Date / Time of Next Visit: 12/17/2019 @ 12:40PM   Claim Information   Patient Name: Norm Prabhakar  Claim Number:     Employer: [employer]  Date of Injury: 11/29/2019     Insurer / TPA: Nathen Siegel  ID / SSN:     Occupation:   Diagnosis: The encounter diagnosis was Sprain of left ankle, unspecified ligament, initial encounter.    Medical Information   Related to Industrial Injury? Yes    Subjective Complaints:  DOI: 11/29/19. Patient reports he \"rolled\" the left ankle at work at [employer], and then as he was walking to the [employer] game the next day, he said he rolled it again.  He was seen in the ED the same day, and had x-rays performed which were normal.  Patient has no knee or hip pain and no back pain.  He presents to urgent care today for follow up and reports significant improvement in his pain. He hasn't returned to work yet. He isn't taking any OTC medications for the pain. No prior left foot/ankle injuries.    Objective Findings: Left ankle: He exhibits normal range of motion, no swelling and no ecchymosis. No tenderness. No lateral malleolus, no medial malleolus and no head of 5th metatarsal tenderness found. Achilles tendon exhibits no pain.    Pre-Existing Condition(s): No pre-existing conditions, but subsequent left ankle injury one day after originally injury at work   Assessment:   Initial Visit    Status: Additional Care Required  Permanent Disability:No    Plan:      Diagnostics: X-ray  Comments:negative     Comments:       Disability Information   Status: Released to Full Duty    From:  12/10/2019  Through: 12/17/2019 Restrictions are:     Physical Restrictions   Sitting:    Standing:    Stooping:    Bending:      Squatting:  " Walking:    Climbing:    Pushing:      Pulling:    Other:    Reaching Above Shoulder (L):   Reaching Above Shoulder (R):       Reaching Below Shoulder (L):    Reaching Below Shoulder (R):      Not to exceed Weight Limits   Carrying(hrs):   Weight Limit(lb):   Lifting(hrs):   Weight  Limit(lb):     Comments: Patient released to full duty at today's visit  RTC in 1 week for follow up, expect discharge/MMI at that time     Repetitive Actions   Hands: i.e. Fine Manipulations from Grasping:     Feet: i.e. Operating Foot Controls:     Driving / Operate Machinery:     Physician Name: Eleanor Linares P.A.-C. Physician Signature: ELEANOR Burris P.A.-C. e-Signature: Dr. Yaya Vega, Medical Director   Clinic Name / Location: [address] [address] Clinic Phone Number: Dept: [phone]   Appointment Time: 12:30 Pm Visit Start Time: 12:53 PM   Check-In Time:  12:12 Pm Visit Discharge Time: 1:37 PM   Original-Treating Physician or Chiropractor    Page 2-Insurer/TPA    Page 3-Employer    Page 4-Employee

## 2019-12-10 NOTE — PROGRESS NOTES
"Subjective:      Norm Prabhakar is a 39 y.o. male who presents with Ankle Injury ([facility] pt states it hurts every now and then, but not as much as it used to)      11/29/19. Patient reports he \"rolled\" the left ankle at work and then as he was walking to the [employer] game the next day, he said he rolled it again.  Patient approached ambulance at the game and asked to be transported here.  Patient has no knee or hip pain and no back pain.  He has no other injuries     Ankle Injury          Review of Systems   Constitutional: Negative for chills and fever.   HENT: Negative for congestion.    Respiratory: Negative for shortness of breath.    Cardiovascular: Negative for chest pain.   Gastrointestinal: Negative for abdominal pain, diarrhea, nausea and vomiting.   Genitourinary: Negative.    Musculoskeletal:        + ankle pain   Skin: Negative for rash.   Neurological: Negative for dizziness.        Objective:     /68 (BP Location: Left arm, Patient Position: Sitting, BP Cuff Size: Large adult)   Pulse 82   Temp 37 °C (98.6 °F) (Temporal)   Resp 18   SpO2 95%      Physical Exam  Vitals signs and nursing note reviewed.   Constitutional:       Appearance: Normal appearance. He is well-developed.   HENT:      Head: Normocephalic and atraumatic.      Nose: Nose normal. No congestion or rhinorrhea.      Mouth/Throat:      Mouth: Mucous membranes are moist.      Pharynx: No oropharyngeal exudate or posterior oropharyngeal erythema.   Eyes:      Pupils: Pupils are equal, round, and reactive to light.   Neck:      Musculoskeletal: Normal range of motion.   Cardiovascular:      Rate and Rhythm: Normal rate.   Pulmonary:      Effort: Pulmonary effort is normal.   Musculoskeletal: Normal range of motion.      Left ankle: He exhibits normal range of motion, no swelling and no ecchymosis. No tenderness. No lateral malleolus, no medial malleolus and no head of 5th metatarsal tenderness found. Achilles tendon exhibits no " pain.   Neurological:      Mental Status: He is alert and oriented to person, place, and time.   Psychiatric:         Behavior: Behavior normal.            PMH:  has a past medical history of Anxiety, ASTHMA, Bipolar 1 disorder (HCC), Depression, Fall, Glaucoma, Glaucoma (1982), Hypothyroidism, Indigestion, Mental disorder, Murmur, Pneumonia, Psychiatric problem (2002), S/P thyroidectomy, Seizure (HCC) (2010), Seizure disorder (HCC), and Unspecified disorder of thyroid.  MEDS:   Current Outpatient Medications:   •  ibuprofen (MOTRIN) 200 MG Tab, Take 200 mg by mouth every 6 hours as needed for Headache or Mild Pain., Disp: , Rfl:   •  lidocaine (XYLOCAINE) 5 % Ointment, Apply 1 Application to affected area(s) 2 Times a Day. applied to lower back, Disp: , Rfl:   •  montelukast (SINGULAIR) 10 MG Tab, Take 10 mg by mouth every day., Disp: , Rfl:   •  nystatin (MYCOSTATIN) 516004 UNIT/GM Cream topical cream, Apply 1 Application to affected area(s) 2 times a day., Disp: , Rfl:   •  sertraline (ZOLOFT) 100 MG Tab, Take 1.5 Tabs by mouth every day., Disp: 30 Tab, Rfl: 0  •  aspirin EC (ECOTRIN) 81 MG Tablet Delayed Response, Take 81 mg by mouth every day., Disp: , Rfl:   •  atorvastatin (LIPITOR) 80 MG tablet, Take 80 mg by mouth every evening., Disp: , Rfl:   •  insulin glargine (BASAGLAR KWIKPEN) 100 UNIT/ML Solution Pen-injector injection, Inject 20 Units as instructed every evening., Disp: , Rfl:   •  busPIRone (BUSPAR) 10 MG Tab tablet, Take 10 mg by mouth 2 times a day., Disp: , Rfl:   •  divalproex (DEPAKOTE) 500 MG Tablet Delayed Response, Take 500 mg by mouth 2 Times a Day., Disp: , Rfl:   •  glimepiride (AMARYL) 4 MG Tab, Take 4 mg by mouth every morning., Disp: , Rfl:   •  lurasidone (LATUDA) 20 MG Tab, Take 20 mg by mouth every evening., Disp: , Rfl:   •  levothyroxine (SYNTHROID) 150 MCG Tab, Take 150 mcg by mouth Every morning on an empty stomach., Disp: , Rfl:   •  metFORMIN (GLUCOPHAGE) 500 MG Tab, Take 500  "mg by mouth 2 times a day., Disp: , Rfl:   •  prazosin (MINIPRESS) 2 MG Cap, Take 2 mg by mouth every evening., Disp: , Rfl:   •  raNITidine (ZANTAC) 150 MG Tab, Take 150 mg by mouth 2 times a day., Disp: , Rfl:   ALLERGIES:   Allergies   Allergen Reactions   • Abilify Unspecified     \"Feeling tired, like I don't even know whats going on around me\"   • Fish      Pt reports fish causes him to be sick to his stomach       SURGHX:   Past Surgical History:   Procedure Laterality Date   • EYE SURGERY     • OTHER      Hernia Repair when he was 10 yrs old   • THYROID LOBECTOMY       SOCHX:  reports that he quit smoking about 19 months ago. His smoking use included cigarettes and cigars. He smoked 0.25 packs per day. He has never used smokeless tobacco. He reports current drug use. Drug: Inhaled. He reports that he does not drink alcohol.  FH: family history includes Heart Disease in his mother; Hypertension in his mother; Lung Disease in his mother; Stroke in his maternal grandmother.    Assessment/Plan:       1. Sprain of left ankle, unspecified ligament, initial encounter    Patient released to full duty at today's visit   RTC in 1 week for follow up, expect discharge/MMI at that time   "

## 2019-12-17 ENCOUNTER — OCCUPATIONAL MEDICINE (OUTPATIENT)
Dept: URGENT CARE | Facility: CLINIC | Age: 39
End: 2019-12-17
Payer: MEDICAID

## 2019-12-17 VITALS
RESPIRATION RATE: 16 BRPM | TEMPERATURE: 97.8 F | BODY MASS INDEX: 34.71 KG/M2 | WEIGHT: 300 LBS | SYSTOLIC BLOOD PRESSURE: 120 MMHG | HEIGHT: 78 IN | OXYGEN SATURATION: 99 % | DIASTOLIC BLOOD PRESSURE: 76 MMHG | HEART RATE: 74 BPM

## 2019-12-17 DIAGNOSIS — S93.402D SPRAIN OF LEFT ANKLE, UNSPECIFIED LIGAMENT, SUBSEQUENT ENCOUNTER: ICD-10-CM

## 2019-12-17 PROCEDURE — 99214 OFFICE O/P EST MOD 30 MIN: CPT | Performed by: FAMILY MEDICINE

## 2019-12-17 NOTE — PROGRESS NOTES
"Subjective:      Norm Prabhakar is a 39 y.o. male who presents with Work-Related Injury (DOI 11/29/2019 (L) ankle,limited ROM with pain. After a couple hours of standing, he has to sit. )      \" 11/29/19. Patient reports he \"rolled\" the left ankle at work and then as he was walking to the [employer] game the next day, he said he rolled it again.  Patient approached ambulance at the game and asked to be transported here.  Patient has no knee or hip pain and no back pain.  He has no other injuries \"  ** today 12/17/2019 still has pain to lateral ankle/foot if stands for more then 2 hrs.      HPI    Review of Systems   Musculoskeletal: Positive for joint pain.   All other systems reviewed and are negative.         Objective:     /76   Pulse 74   Temp 36.6 °C (97.8 °F) (Temporal)   Resp 16   Ht 1.981 m (6' 6\")   Wt (!) 136.1 kg (300 lb)   SpO2 99%   BMI 34.67 kg/m²      Physical Exam  Vitals signs and nursing note reviewed.   Constitutional:       General: He is not in acute distress.     Appearance: He is well-developed. He is not diaphoretic.   HENT:      Head: Normocephalic.   Cardiovascular:      Heart sounds: Normal heart sounds. No murmur.   Pulmonary:      Effort: Pulmonary effort is normal. No respiratory distress.   Skin:     General: Skin is warm.   Neurological:      Mental Status: He is alert.      Motor: No abnormal muscle tone.   Psychiatric:         Judgment: Judgment normal.         Lt ankle/foot: + edema and TTP lateral dorsal aspect mid foot and lateral malleolus. Good SROM       Assessment/Plan:           1. Sprain of left ankle, unspecified ligament, subsequent encounter           - work related    -  ** MAY USE A WALKER BOOT AT WORK. MAY TAKE A 30 MIN BREAK AFTER EVERY 2 HRS OF STANDING/WALKING IF NEEDED **    - walker boot/tall    - 1 wk recheck     "

## 2019-12-17 NOTE — LETTER
"   [facility]  [address] [place] [address]  Phone:  [phone] - Fax:  [phone]   [facility] Progress Report and Disability Certification  Date of Service: 12/17/2019   No Show:  No  Date / Time of Next Visit: 12/25/2019 @ 3PM   Claim Information   Patient Name: Norm Prabhakar  Claim Number:     Employer: [employer]  Date of Injury: 11/29/2019     Insurer / TPA: Nathen Siegel  ID / SSN:     Occupation:   Diagnosis: The encounter diagnosis was Sprain of left ankle, unspecified ligament, subsequent encounter.    Medical Information   Related to Industrial Injury? Yes    Subjective Complaints:  \" 11/29/19. Patient reports he \"rolled\" the left ankle at work and then as he was walking to the [employer] game the next day, he said he rolled it again.  Patient approached ambulance at the game and asked to be transported here.  Patient has no knee or hip pain and no back pain.  He has no other injuries \"  ** today 12/17/2019 still has pain to lateral ankle/foot if stands for more then 2 hrs.    Objective Findings: Lt ankle/foot: + edema and TTP lateral dorsal aspect mid foot and lateral malleolus. Good SROM   Pre-Existing Condition(s):     Assessment:   Condition Improved    Status: Additional Care Required  Permanent Disability:No    Plan:      Diagnostics:      Comments:       Disability Information   Status: Released to Restricted Duty    From:  12/17/2019  Through: 12/25/2019 Restrictions are: Temporary   Physical Restrictions   Sitting:    Standing:    Stooping:    Bending:      Squatting:    Walking:    Climbing:    Pushing:      Pulling:    Other:    Reaching Above Shoulder (L):   Reaching Above Shoulder (R):       Reaching Below Shoulder (L):    Reaching Below Shoulder (R):      Not to exceed Weight Limits   Carrying(hrs):   Weight Limit(lb):   Lifting(hrs):   Weight  Limit(lb):     Comments: ** MAY USE A WALKER BOOT AT WORK. MAY TAKE " A 30 MIN BREAK AFTER EVERY 2 HRS OF STANDING/WALKING IF NEEDED **    Repetitive Actions   Hands: i.e. Fine Manipulations from Grasping:     Feet: i.e. Operating Foot Controls:     Driving / Operate Machinery:     Physician Name: Neftali Carroll M.D. Physician Signature: NEFTALI Aguirre M.D. e-Signature: Dr. Yaya Vega, Medical Director   Clinic Name / Location: [address] [address] Clinic Phone Number: Dept: [phone]   Appointment Time: 12:45 Pm Visit Start Time: 1:20 PM   Check-In Time:  1:03 Pm Visit Discharge Time: 2:01 PM   Original-Treating Physician or Chiropractor    Page 2-Insurer/TPA    Page 3-Employer    Page 4-Employee

## 2019-12-21 ENCOUNTER — HOSPITAL ENCOUNTER (EMERGENCY)
Facility: MEDICAL CENTER | Age: 39
End: 2019-12-21
Attending: EMERGENCY MEDICINE
Payer: MEDICAID

## 2019-12-21 VITALS
SYSTOLIC BLOOD PRESSURE: 116 MMHG | HEART RATE: 71 BPM | OXYGEN SATURATION: 96 % | WEIGHT: 290 LBS | TEMPERATURE: 97.9 F | BODY MASS INDEX: 33.55 KG/M2 | DIASTOLIC BLOOD PRESSURE: 66 MMHG | RESPIRATION RATE: 16 BRPM | HEIGHT: 78 IN

## 2019-12-21 DIAGNOSIS — R19.7 NAUSEA, VOMITING AND DIARRHEA: ICD-10-CM

## 2019-12-21 DIAGNOSIS — R11.2 NAUSEA, VOMITING AND DIARRHEA: ICD-10-CM

## 2019-12-21 LAB
ALBUMIN SERPL BCP-MCNC: 4.5 G/DL (ref 3.2–4.9)
ALBUMIN/GLOB SERPL: 2.3 G/DL
ALP SERPL-CCNC: 39 U/L (ref 30–99)
ALT SERPL-CCNC: 18 U/L (ref 2–50)
ANION GAP SERPL CALC-SCNC: 12 MMOL/L (ref 0–11.9)
APTT PPP: 28.1 SEC (ref 24.7–36)
AST SERPL-CCNC: 17 U/L (ref 12–45)
BASOPHILS # BLD AUTO: 0.7 % (ref 0–1.8)
BASOPHILS # BLD: 0.06 K/UL (ref 0–0.12)
BILIRUB SERPL-MCNC: 0.2 MG/DL (ref 0.1–1.5)
BUN SERPL-MCNC: 26 MG/DL (ref 8–22)
CALCIUM SERPL-MCNC: 9.4 MG/DL (ref 8.5–10.5)
CHLORIDE SERPL-SCNC: 104 MMOL/L (ref 96–112)
CO2 SERPL-SCNC: 22 MMOL/L (ref 20–33)
CREAT SERPL-MCNC: 0.86 MG/DL (ref 0.5–1.4)
EOSINOPHIL # BLD AUTO: 0.21 K/UL (ref 0–0.51)
EOSINOPHIL NFR BLD: 2.6 % (ref 0–6.9)
ERYTHROCYTE [DISTWIDTH] IN BLOOD BY AUTOMATED COUNT: 45 FL (ref 35.9–50)
GLOBULIN SER CALC-MCNC: 2 G/DL (ref 1.9–3.5)
GLUCOSE SERPL-MCNC: 191 MG/DL (ref 65–99)
HCT VFR BLD AUTO: 38.2 % (ref 42–52)
HGB BLD-MCNC: 12.2 G/DL (ref 14–18)
IMM GRANULOCYTES # BLD AUTO: 0.09 K/UL (ref 0–0.11)
IMM GRANULOCYTES NFR BLD AUTO: 1.1 % (ref 0–0.9)
INR PPP: 0.97 (ref 0.87–1.13)
LIPASE SERPL-CCNC: 15 U/L (ref 11–82)
LYMPHOCYTES # BLD AUTO: 2 K/UL (ref 1–4.8)
LYMPHOCYTES NFR BLD: 24.3 % (ref 22–41)
MCH RBC QN AUTO: 29.7 PG (ref 27–33)
MCHC RBC AUTO-ENTMCNC: 31.9 G/DL (ref 33.7–35.3)
MCV RBC AUTO: 92.9 FL (ref 81.4–97.8)
MONOCYTES # BLD AUTO: 0.96 K/UL (ref 0–0.85)
MONOCYTES NFR BLD AUTO: 11.7 % (ref 0–13.4)
NEUTROPHILS # BLD AUTO: 4.9 K/UL (ref 1.82–7.42)
NEUTROPHILS NFR BLD: 59.6 % (ref 44–72)
NRBC # BLD AUTO: 0 K/UL
NRBC BLD-RTO: 0 /100 WBC
PLATELET # BLD AUTO: 279 K/UL (ref 164–446)
PMV BLD AUTO: 9.8 FL (ref 9–12.9)
POTASSIUM SERPL-SCNC: 4 MMOL/L (ref 3.6–5.5)
PROT SERPL-MCNC: 6.5 G/DL (ref 6–8.2)
PROTHROMBIN TIME: 13.1 SEC (ref 12–14.6)
RBC # BLD AUTO: 4.11 M/UL (ref 4.7–6.1)
SODIUM SERPL-SCNC: 138 MMOL/L (ref 135–145)
WBC # BLD AUTO: 8.2 K/UL (ref 4.8–10.8)

## 2019-12-21 PROCEDURE — 85730 THROMBOPLASTIN TIME PARTIAL: CPT

## 2019-12-21 PROCEDURE — 96374 THER/PROPH/DIAG INJ IV PUSH: CPT

## 2019-12-21 PROCEDURE — 700111 HCHG RX REV CODE 636 W/ 250 OVERRIDE (IP): Performed by: EMERGENCY MEDICINE

## 2019-12-21 PROCEDURE — 85610 PROTHROMBIN TIME: CPT

## 2019-12-21 PROCEDURE — 99284 EMERGENCY DEPT VISIT MOD MDM: CPT

## 2019-12-21 PROCEDURE — 85025 COMPLETE CBC W/AUTO DIFF WBC: CPT

## 2019-12-21 PROCEDURE — 80053 COMPREHEN METABOLIC PANEL: CPT

## 2019-12-21 PROCEDURE — 83690 ASSAY OF LIPASE: CPT

## 2019-12-21 PROCEDURE — 700102 HCHG RX REV CODE 250 W/ 637 OVERRIDE(OP): Performed by: EMERGENCY MEDICINE

## 2019-12-21 PROCEDURE — A9270 NON-COVERED ITEM OR SERVICE: HCPCS | Performed by: EMERGENCY MEDICINE

## 2019-12-21 RX ORDER — FENOFIBRATE 130 MG/1
130 CAPSULE ORAL EVERY EVENING
COMMUNITY
End: 2021-02-03

## 2019-12-21 RX ORDER — SUCRALFATE 1 G/1
1 TABLET ORAL ONCE
Status: COMPLETED | OUTPATIENT
Start: 2019-12-21 | End: 2019-12-21

## 2019-12-21 RX ORDER — ONDANSETRON 4 MG/1
4 TABLET, ORALLY DISINTEGRATING ORAL EVERY 6 HOURS PRN
Qty: 5 TAB | Refills: 0 | Status: SHIPPED | OUTPATIENT
Start: 2019-12-21 | End: 2019-12-27

## 2019-12-21 RX ORDER — LISINOPRIL 10 MG/1
10 TABLET ORAL DAILY
COMMUNITY
End: 2021-02-03

## 2019-12-21 RX ORDER — ONDANSETRON 2 MG/ML
4 INJECTION INTRAMUSCULAR; INTRAVENOUS ONCE
Status: COMPLETED | OUTPATIENT
Start: 2019-12-21 | End: 2019-12-21

## 2019-12-21 RX ORDER — GLUCOSAMINE HCL 500 MG
TABLET ORAL
COMMUNITY
End: 2019-12-27

## 2019-12-21 RX ORDER — TRIAMCINOLONE ACETONIDE 1 MG/G
CREAM TOPICAL 2 TIMES DAILY
COMMUNITY
End: 2019-12-27

## 2019-12-21 RX ADMIN — SUCRALFATE 1 G: 1 TABLET ORAL at 19:25

## 2019-12-21 RX ADMIN — ONDANSETRON 4 MG: 2 INJECTION INTRAMUSCULAR; INTRAVENOUS at 19:07

## 2019-12-22 NOTE — ED TRIAGE NOTES
Pt to ED with complaints of NVD x4 days and blood in vomit today. He does report he has been experiencing multiple loose stools at home over last 4 days. He  States yesterday he has 2 blood noses. He  Vomited dark red blood today after lunch today. Does think he swallowed form blood yesterday .

## 2019-12-22 NOTE — ED NOTES
Pt provided a brief for undergarments and white paper suit for pants. DC instructions for NVD discussed. Pt instructed to follow up with primary Monday is symptoms continue.  A noted was given for patient to provided to kitchen at group home re: diet recommendations. He is ambulatory on DC. MT contact on his behalf for a ride home.

## 2019-12-22 NOTE — ED NOTES
Pt has another very large loose stool.     He reports nausea but no vomiting after clear liquids.

## 2019-12-22 NOTE — ED PROVIDER NOTES
"ED Provider Note    CHIEF COMPLAINT  Chief Complaint   Patient presents with   • N/V   • Diarrhea   • Throwing Up Blood        [facility]    Primary care provider: ANIKET Davis   History obtained from: Patient  History limited by: None     Norm Prabhakar is a 39 y.o. male who presents to the ED by EMS from his group home complaining of nausea, vomiting and diarrhea.  He states that he has had \"nonstop\" watery diarrhea for the past 4 days and that he \"lost count.\"  He has not noticed any blood or melena.  He reports 4-5 episodes of vomiting today and noticed vomiting approximately 1 handful of blood after lunch today.  Patient also reports he had a bloody nose yesterday.  He is not on any blood thinners.  He denies any pain except \"upset stomach\" that feels like \"it is gurgling\" and also left ankle pain due to injury last month.  He denies fever/suspicious oral intake/recent foreign travels/recent antibiotic/shortness of breath or difficulty breathing/dysuria/rash.  He has not taken any medication or noticed anything that has helped with his symptoms.    REVIEW OF SYSTEMS  Please see HPI for pertinent positives/negatives.  All other systems reviewed and are negative.     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   • Hypothyroidism    • Indigestion     once in a while   • Mental disorder     learning disabilities; speech impairment; developmental delays   • Murmur     since birth   • Pneumonia     remote   • S/P thyroidectomy    • Seizure disorder ([facility])    • Unspecified disorder of thyroid         SURGICAL HISTORY  Past Surgical History:   Procedure Laterality Date   • EYE SURGERY     • OTHER      Hernia Repair when he was 10 yrs old   • THYROID LOBECTOMY   " "       SOCIAL HISTORY  Social History     Tobacco Use   • Smoking status: Former Smoker     Packs/day: 0.25     Types: Cigarettes, Cigars     Last attempt to quit: 2018     Years since quittin.6   • Smokeless tobacco: Never Used   Substance and Sexual Activity   • Alcohol use: No   • Drug use: Yes     Types: Inhaled     Comment: marijuana   • Sexual activity: Not on file        FAMILY HISTORY  Family History   Problem Relation Age of Onset   • Hypertension Mother    • Heart Disease Mother    • Lung Disease Mother    • Stroke Maternal Grandmother         CURRENT MEDICATIONS  Home Medications     Reviewed by Hilda Bone R.N. (Registered Nurse) on 19 at 1811  Med List Status: Partial   Medication Last Dose Status   aspirin EC (ECOTRIN) 81 MG Tablet Delayed Response  Active   atorvastatin (LIPITOR) 80 MG tablet  Active   busPIRone (BUSPAR) 10 MG Tab tablet  Active   Cholecalciferol (VITAMIN D3) 3000 units Tab  Active   divalproex (DEPAKOTE) 500 MG Tablet Delayed Response  Active   Empagliflozin (JARDIANCE) 25 MG Tab  Active   fenofibrate (ANTARA) 130 MG capsule  Active   glimepiride (AMARYL) 4 MG Tab  Active   ibuprofen (MOTRIN) 200 MG Tab  Active   insulin glargine (BASAGLAR KWIKPEN) 100 UNIT/ML Solution Pen-injector injection  Active   levothyroxine (SYNTHROID) 150 MCG Tab  Active   lidocaine (XYLOCAINE) 5 % Ointment  Active   lisinopril (PRINIVIL) 10 MG Tab  Active   metFORMIN (GLUCOPHAGE) 500 MG Tab  Active   montelukast (SINGULAIR) 10 MG Tab  Active   nystatin (MYCOSTATIN) 894514 UNIT/GM Cream topical cream  Active   prazosin (MINIPRESS) 2 MG Cap  Active   raNITidine (ZANTAC) 150 MG Tab  Active   sertraline (ZOLOFT) 100 MG Tab  Active   triamcinolone acetonide (KENALOG) 0.1 % Cream  Active                 ALLERGIES  Allergies   Allergen Reactions   • Abilify Unspecified     \"Feeling tired, like I don't even know whats going on around me\"   • Fish      Pt reports fish causes him to be sick to " "his stomach          PHYSICAL EXAM  VITAL SIGNS: /66   Pulse 71   Temp 36.6 °C (97.9 °F) (Temporal)   Resp 16   Ht 1.981 m (6' 6\")   Wt (!) 131.5 kg (290 lb)   SpO2 96%   BMI 33.51 kg/m²  @NGHIA[[id number]::@     Pulse ox interpretation: 96% I interpret this pulse ox as normal     Constitutional: Well developed, well nourished, alert in no apparent distress, nontoxic appearance    HENT: No external signs of trauma, normocephalic, oropharynx moist and clear, nose normal    Eyes: Left eye with chronic appearing changes, conjunctiva without erythema, no discharge, no icterus    Neck: Soft and supple, trachea midline, no stridor, no tenderness, no LAD, no JVD, good ROM    Cardiovascular: Regular rate and rhythm, no murmurs/rubs/gallops, strong distal pulses and good perfusion    Thorax & Lungs: No respiratory distress, CTAB   Abdomen: Soft, nontender, nondistended, no guarding, no rebound, normal BS    Back: No CVAT    Extremities: No cyanosis, left ankle in walking boot, intact distal pulses with brisk cap refill    Skin: Warm, dry, no pallor/cyanosis, no rash noted    Lymphatic: No lymphadenopathy noted    Neuro: A/O times 3, no focal deficits noted    Psychiatric: Cooperative      DIAGNOSTIC STUDIES / PROCEDURES        LABS  All labs reviewed by me.     Results for orders placed or performed during the hospital encounter of 12/21/19   CBC WITH DIFFERENTIAL   Result Value Ref Range    WBC 8.2 4.8 - 10.8 K/uL    RBC 4.11 (L) 4.70 - 6.10 M/uL    Hemoglobin 12.2 (L) 14.0 - 18.0 g/dL    Hematocrit 38.2 (L) 42.0 - 52.0 %    MCV 92.9 81.4 - 97.8 fL    MCH 29.7 27.0 - 33.0 pg    MCHC 31.9 (L) 33.7 - 35.3 g/dL    RDW 45.0 35.9 - 50.0 fL    Platelet Count 279 164 - 446 K/uL    MPV 9.8 9.0 - 12.9 fL    Neutrophils-Polys 59.60 44.00 - 72.00 %    Lymphocytes 24.30 22.00 - 41.00 %    Monocytes 11.70 0.00 - 13.40 %    Eosinophils 2.60 0.00 - 6.90 %    Basophils 0.70 0.00 - 1.80 %    Immature Granulocytes 1.10 (H) 0.00 - 0.90 " %    Nucleated RBC 0.00 /100 WBC    Neutrophils (Absolute) 4.90 1.82 - 7.42 K/uL    Lymphs (Absolute) 2.00 1.00 - 4.80 K/uL    Monos (Absolute) 0.96 (H) 0.00 - 0.85 K/uL    Eos (Absolute) 0.21 0.00 - 0.51 K/uL    Baso (Absolute) 0.06 0.00 - 0.12 K/uL    Immature Granulocytes (abs) 0.09 0.00 - 0.11 K/uL    NRBC (Absolute) 0.00 K/uL   COMP METABOLIC PANEL   Result Value Ref Range    Sodium 138 135 - 145 mmol/L    Potassium 4.0 3.6 - 5.5 mmol/L    Chloride 104 96 - 112 mmol/L    Co2 22 20 - 33 mmol/L    Anion Gap 12.0 (H) 0.0 - 11.9    Glucose 191 (H) 65 - 99 mg/dL    Bun 26 (H) 8 - 22 mg/dL    Creatinine 0.86 0.50 - 1.40 mg/dL    Calcium 9.4 8.5 - 10.5 mg/dL    AST(SGOT) 17 12 - 45 U/L    ALT(SGPT) 18 2 - 50 U/L    Alkaline Phosphatase 39 30 - 99 U/L    Total Bilirubin 0.2 0.1 - 1.5 mg/dL    Albumin 4.5 3.2 - 4.9 g/dL    Total Protein 6.5 6.0 - 8.2 g/dL    Globulin 2.0 1.9 - 3.5 g/dL    A-G Ratio 2.3 g/dL   LIPASE   Result Value Ref Range    Lipase 15 11 - 82 U/L   PROTHROMBIN TIME (INR)   Result Value Ref Range    PT 13.1 12.0 - 14.6 sec    INR 0.97 0.87 - 1.13   APTT   Result Value Ref Range    APTT 28.1 24.7 - 36.0 sec   ESTIMATED GFR   Result Value Ref Range    GFR If African American >60 >60 mL/min/1.73 m 2    GFR If Non African American >60 >60 mL/min/1.73 m 2        RADIOLOGY  The radiologist's interpretation of all radiological studies have been reviewed by me.     No orders to display          COURSE & MEDICAL DECISION MAKING  Nursing notes, VS, PMSFHx reviewed in chart.     Review of past medical records shows the patient was last seen in this ED November 30, 2019 for left ankle injury.      Differential diagnoses considered include but are not limited to: Appendicitis, pancreatitis, PUD, gastritis, gastroenteritis, electrolyte abnormality, dehydration      History and physical exam as above.  Labs are fairly unremarkable except for mild anemia and hyperglycemia.  Patient was treated with Zofran and  Carafate.  He subsequently tolerated oral fluids without further vomiting.  He continues to have nonbloody watery stools.  I discussed the findings with the patient.  He is noted to be in no acute distress and nontoxic in appearance.  No signs of acute abdomen on recheck to suggest a surgical emergency or need for emergent imaging.  Discussed with patient likely viral AGE and its self-limiting nature.  He was advised on hydration and good hygiene.  He will be prescribed Zofran to use as needed.  I discussed with patient worrisome signs and symptoms and return to ED precautions and he was advised on outpatient follow-up.  He verbalized understanding and agreed with plan of care with no further questions or concerns.      The patient is referred to a primary physician for blood pressure management, diabetic screening, and for all other preventative health concerns.       FINAL IMPRESSION  1. Nausea, vomiting and diarrhea Acute          DISPOSITION  Patient will be discharged home in stable condition.       FOLLOW UP  ANIKET Davis  [address] [place] [id number] [phone]    Call in 2 days      [facility], Emergency Dept  [address] [facility] [address] [phone]    If symptoms worsen         OUTPATIENT MEDICATIONS  Discharge Medication List as of 12/21/2019  9:30 PM      START taking these medications    Details   ondansetron (ZOFRAN ODT) 4 MG TABLET DISPERSIBLE Take 1 Tab by mouth every 6 hours as needed., Disp-5 Tab, R-0, Print Rx Paper                Electronically signed by: Juan F Hendricks, 12/21/2019 6:18 PM      Portions of this record were made with voice recognition software.  Despite my review, spelling/grammar/context errors may still remain.  Interpretation of this chart should be taken in this context.

## 2019-12-27 ENCOUNTER — APPOINTMENT (OUTPATIENT)
Dept: RADIOLOGY | Facility: MEDICAL CENTER | Age: 39
End: 2019-12-27
Attending: EMERGENCY MEDICINE
Payer: MEDICAID

## 2019-12-27 ENCOUNTER — APPOINTMENT (OUTPATIENT)
Dept: RADIOLOGY | Facility: MEDICAL CENTER | Age: 39
End: 2019-12-27
Attending: PSYCHIATRY & NEUROLOGY
Payer: MEDICAID

## 2019-12-27 ENCOUNTER — HOSPITAL ENCOUNTER (OUTPATIENT)
Facility: MEDICAL CENTER | Age: 39
End: 2019-12-31
Attending: EMERGENCY MEDICINE | Admitting: FAMILY MEDICINE
Payer: MEDICAID

## 2019-12-27 DIAGNOSIS — E66.9 DIABETES MELLITUS TYPE 2 IN OBESE: ICD-10-CM

## 2019-12-27 DIAGNOSIS — R53.1 RIGHT SIDED WEAKNESS: Primary | ICD-10-CM

## 2019-12-27 DIAGNOSIS — G81.91 RIGHT HEMIPARESIS (HCC): ICD-10-CM

## 2019-12-27 DIAGNOSIS — E11.69 DIABETES MELLITUS TYPE 2 IN OBESE: ICD-10-CM

## 2019-12-27 DIAGNOSIS — R56.9 SEIZURE (HCC): ICD-10-CM

## 2019-12-27 PROBLEM — I63.9 CVA (CEREBRAL VASCULAR ACCIDENT) (HCC): Status: ACTIVE | Noted: 2019-12-27

## 2019-12-27 LAB
ABO GROUP BLD: NORMAL
ALBUMIN SERPL BCP-MCNC: 4.7 G/DL (ref 3.2–4.9)
ALBUMIN/GLOB SERPL: 1.5 G/DL
ALP SERPL-CCNC: 55 U/L (ref 30–99)
ALT SERPL-CCNC: 10 U/L (ref 2–50)
ANION GAP SERPL CALC-SCNC: 12 MMOL/L (ref 0–11.9)
APTT PPP: 30.5 SEC (ref 24.7–36)
AST SERPL-CCNC: 13 U/L (ref 12–45)
BASOPHILS # BLD AUTO: 0.5 % (ref 0–1.8)
BASOPHILS # BLD: 0.12 K/UL (ref 0–0.12)
BILIRUB SERPL-MCNC: 0.5 MG/DL (ref 0.1–1.5)
BLD GP AB SCN SERPL QL: NORMAL
BUN SERPL-MCNC: 20 MG/DL (ref 8–22)
CALCIUM SERPL-MCNC: 9.9 MG/DL (ref 8.5–10.5)
CHLORIDE SERPL-SCNC: 100 MMOL/L (ref 96–112)
CO2 SERPL-SCNC: 23 MMOL/L (ref 20–33)
CREAT SERPL-MCNC: 1.14 MG/DL (ref 0.5–1.4)
EKG IMPRESSION: NORMAL
EOSINOPHIL # BLD AUTO: 0 K/UL (ref 0–0.51)
EOSINOPHIL NFR BLD: 0 % (ref 0–6.9)
ERYTHROCYTE [DISTWIDTH] IN BLOOD BY AUTOMATED COUNT: 44.1 FL (ref 35.9–50)
GLOBULIN SER CALC-MCNC: 3.1 G/DL (ref 1.9–3.5)
GLUCOSE SERPL-MCNC: 130 MG/DL (ref 65–99)
HCT VFR BLD AUTO: 42.9 % (ref 42–52)
HGB BLD-MCNC: 13.7 G/DL (ref 14–18)
IMM GRANULOCYTES # BLD AUTO: 0.2 K/UL (ref 0–0.11)
IMM GRANULOCYTES NFR BLD AUTO: 0.9 % (ref 0–0.9)
INR PPP: 1.06 (ref 0.87–1.13)
LYMPHOCYTES # BLD AUTO: 2.44 K/UL (ref 1–4.8)
LYMPHOCYTES NFR BLD: 10.8 % (ref 22–41)
MCH RBC QN AUTO: 28.9 PG (ref 27–33)
MCHC RBC AUTO-ENTMCNC: 31.9 G/DL (ref 33.7–35.3)
MCV RBC AUTO: 90.5 FL (ref 81.4–97.8)
MONOCYTES # BLD AUTO: 2.41 K/UL (ref 0–0.85)
MONOCYTES NFR BLD AUTO: 10.7 % (ref 0–13.4)
NEUTROPHILS # BLD AUTO: 17.44 K/UL (ref 1.82–7.42)
NEUTROPHILS NFR BLD: 77.1 % (ref 44–72)
NRBC # BLD AUTO: 0 K/UL
NRBC BLD-RTO: 0 /100 WBC
PLATELET # BLD AUTO: 331 K/UL (ref 164–446)
PMV BLD AUTO: 10.1 FL (ref 9–12.9)
POTASSIUM SERPL-SCNC: 4.2 MMOL/L (ref 3.6–5.5)
PROT SERPL-MCNC: 7.8 G/DL (ref 6–8.2)
PROTHROMBIN TIME: 14 SEC (ref 12–14.6)
RBC # BLD AUTO: 4.74 M/UL (ref 4.7–6.1)
RH BLD: NORMAL
SODIUM SERPL-SCNC: 135 MMOL/L (ref 135–145)
TROPONIN T SERPL-MCNC: 14 NG/L (ref 6–19)
WBC # BLD AUTO: 22.6 K/UL (ref 4.8–10.8)

## 2019-12-27 PROCEDURE — 85610 PROTHROMBIN TIME: CPT

## 2019-12-27 PROCEDURE — 0042T CT-CEREBRAL PERFUSION ANALYSIS: CPT

## 2019-12-27 PROCEDURE — 306589 SLEEVE,VASO CALF LARGE: Performed by: FAMILY MEDICINE

## 2019-12-27 PROCEDURE — 99218 PR INITIAL OBSERVATION CARE,LEVL I: CPT | Performed by: FAMILY MEDICINE

## 2019-12-27 PROCEDURE — 93005 ELECTROCARDIOGRAM TRACING: CPT | Performed by: EMERGENCY MEDICINE

## 2019-12-27 PROCEDURE — 70496 CT ANGIOGRAPHY HEAD: CPT

## 2019-12-27 PROCEDURE — 84484 ASSAY OF TROPONIN QUANT: CPT

## 2019-12-27 PROCEDURE — 700102 HCHG RX REV CODE 250 W/ 637 OVERRIDE(OP): Performed by: FAMILY MEDICINE

## 2019-12-27 PROCEDURE — 94760 N-INVAS EAR/PLS OXIMETRY 1: CPT

## 2019-12-27 PROCEDURE — 71045 X-RAY EXAM CHEST 1 VIEW: CPT

## 2019-12-27 PROCEDURE — 70450 CT HEAD/BRAIN W/O DYE: CPT

## 2019-12-27 PROCEDURE — G0378 HOSPITAL OBSERVATION PER HR: HCPCS

## 2019-12-27 PROCEDURE — 700117 HCHG RX CONTRAST REV CODE 255: Performed by: EMERGENCY MEDICINE

## 2019-12-27 PROCEDURE — 86900 BLOOD TYPING SEROLOGIC ABO: CPT

## 2019-12-27 PROCEDURE — 80053 COMPREHEN METABOLIC PANEL: CPT

## 2019-12-27 PROCEDURE — 99213 OFFICE O/P EST LOW 20 MIN: CPT | Performed by: PSYCHIATRY & NEUROLOGY

## 2019-12-27 PROCEDURE — 70498 CT ANGIOGRAPHY NECK: CPT

## 2019-12-27 PROCEDURE — A9270 NON-COVERED ITEM OR SERVICE: HCPCS | Performed by: FAMILY MEDICINE

## 2019-12-27 PROCEDURE — 86901 BLOOD TYPING SEROLOGIC RH(D): CPT

## 2019-12-27 PROCEDURE — 85730 THROMBOPLASTIN TIME PARTIAL: CPT

## 2019-12-27 PROCEDURE — 82962 GLUCOSE BLOOD TEST: CPT

## 2019-12-27 PROCEDURE — 85025 COMPLETE CBC W/AUTO DIFF WBC: CPT

## 2019-12-27 PROCEDURE — 86850 RBC ANTIBODY SCREEN: CPT

## 2019-12-27 PROCEDURE — 99285 EMERGENCY DEPT VISIT HI MDM: CPT

## 2019-12-27 PROCEDURE — 70551 MRI BRAIN STEM W/O DYE: CPT

## 2019-12-27 RX ORDER — FAMOTIDINE 20 MG/1
20 TABLET, FILM COATED ORAL 2 TIMES DAILY
Status: DISCONTINUED | OUTPATIENT
Start: 2019-12-27 | End: 2019-12-31 | Stop reason: HOSPADM

## 2019-12-27 RX ORDER — AMOXICILLIN 250 MG
2 CAPSULE ORAL 2 TIMES DAILY
Status: DISCONTINUED | OUTPATIENT
Start: 2019-12-28 | End: 2019-12-31 | Stop reason: HOSPADM

## 2019-12-27 RX ORDER — INSULIN GLARGINE 100 [IU]/ML
20 INJECTION, SOLUTION SUBCUTANEOUS EVERY EVENING
Status: DISCONTINUED | OUTPATIENT
Start: 2019-12-27 | End: 2019-12-31 | Stop reason: HOSPADM

## 2019-12-27 RX ORDER — MONTELUKAST SODIUM 10 MG/1
10 TABLET ORAL DAILY
Status: DISCONTINUED | OUTPATIENT
Start: 2019-12-28 | End: 2019-12-31 | Stop reason: HOSPADM

## 2019-12-27 RX ORDER — FENOFIBRATE 134 MG/1
134 CAPSULE ORAL EVERY MORNING
Status: DISCONTINUED | OUTPATIENT
Start: 2019-12-28 | End: 2019-12-31 | Stop reason: HOSPADM

## 2019-12-27 RX ORDER — LISINOPRIL 10 MG/1
10 TABLET ORAL DAILY
Status: DISCONTINUED | OUTPATIENT
Start: 2019-12-28 | End: 2019-12-27

## 2019-12-27 RX ORDER — SERTRALINE HYDROCHLORIDE 100 MG/1
100 TABLET, FILM COATED ORAL DAILY
Status: DISCONTINUED | OUTPATIENT
Start: 2019-12-28 | End: 2019-12-31 | Stop reason: HOSPADM

## 2019-12-27 RX ORDER — POLYETHYLENE GLYCOL 3350 17 G/17G
1 POWDER, FOR SOLUTION ORAL
Status: DISCONTINUED | OUTPATIENT
Start: 2019-12-27 | End: 2019-12-31 | Stop reason: HOSPADM

## 2019-12-27 RX ORDER — CHOLECALCIFEROL (VITAMIN D3) 50 MCG
4000 TABLET ORAL
COMMUNITY
End: 2021-02-03

## 2019-12-27 RX ORDER — SERTRALINE HYDROCHLORIDE 100 MG/1
100 TABLET, FILM COATED ORAL DAILY
COMMUNITY
End: 2021-02-03

## 2019-12-27 RX ORDER — ATORVASTATIN CALCIUM 80 MG/1
80 TABLET, FILM COATED ORAL EVERY EVENING
Status: DISCONTINUED | OUTPATIENT
Start: 2019-12-27 | End: 2019-12-31 | Stop reason: HOSPADM

## 2019-12-27 RX ORDER — PRAZOSIN HYDROCHLORIDE 2 MG/1
2 CAPSULE ORAL EVERY EVENING
Status: DISCONTINUED | OUTPATIENT
Start: 2019-12-27 | End: 2019-12-27

## 2019-12-27 RX ORDER — LATANOPROST 50 UG/ML
1 SOLUTION/ DROPS OPHTHALMIC NIGHTLY
Status: DISCONTINUED | OUTPATIENT
Start: 2019-12-27 | End: 2019-12-31 | Stop reason: HOSPADM

## 2019-12-27 RX ORDER — LEVOTHYROXINE SODIUM 0.2 MG/1
200 TABLET ORAL
Status: DISCONTINUED | OUTPATIENT
Start: 2019-12-28 | End: 2019-12-31 | Stop reason: HOSPADM

## 2019-12-27 RX ORDER — INSULIN GLARGINE 100 [IU]/ML
0.2 INJECTION, SOLUTION SUBCUTANEOUS EVERY EVENING
Status: DISCONTINUED | OUTPATIENT
Start: 2019-12-27 | End: 2019-12-27

## 2019-12-27 RX ORDER — BUSPIRONE HYDROCHLORIDE 10 MG/1
10 TABLET ORAL 3 TIMES DAILY
Status: DISCONTINUED | OUTPATIENT
Start: 2019-12-27 | End: 2019-12-31 | Stop reason: HOSPADM

## 2019-12-27 RX ORDER — BISACODYL 10 MG
10 SUPPOSITORY, RECTAL RECTAL
Status: DISCONTINUED | OUTPATIENT
Start: 2019-12-27 | End: 2019-12-31 | Stop reason: HOSPADM

## 2019-12-27 RX ORDER — LATANOPROST 50 UG/ML
1 SOLUTION/ DROPS OPHTHALMIC NIGHTLY
COMMUNITY
End: 2020-06-20

## 2019-12-27 RX ORDER — DIVALPROEX SODIUM 500 MG/1
500-1500 TABLET, DELAYED RELEASE ORAL 2 TIMES DAILY
Status: DISCONTINUED | OUTPATIENT
Start: 2019-12-27 | End: 2019-12-31 | Stop reason: HOSPADM

## 2019-12-27 RX ADMIN — IOHEXOL 80 ML: 350 INJECTION, SOLUTION INTRAVENOUS at 18:08

## 2019-12-27 RX ADMIN — ATORVASTATIN CALCIUM 80 MG: 80 TABLET, FILM COATED ORAL at 23:54

## 2019-12-27 RX ADMIN — IOHEXOL 40 ML: 350 INJECTION, SOLUTION INTRAVENOUS at 18:03

## 2019-12-27 RX ADMIN — BUSPIRONE HYDROCHLORIDE 10 MG: 10 TABLET ORAL at 23:56

## 2019-12-27 RX ADMIN — DIVALPROEX SODIUM 1500 MG: 500 TABLET, DELAYED RELEASE ORAL at 23:55

## 2019-12-27 RX ADMIN — LATANOPROST 1 DROP: 50 SOLUTION OPHTHALMIC at 23:59

## 2019-12-27 RX ADMIN — FAMOTIDINE 20 MG: 20 TABLET ORAL at 23:55

## 2019-12-27 ASSESSMENT — LIFESTYLE VARIABLES
AVERAGE NUMBER OF DAYS PER WEEK YOU HAVE A DRINK CONTAINING ALCOHOL: 0
HAVE YOU EVER FELT YOU SHOULD CUT DOWN ON YOUR DRINKING: NO
TOTAL SCORE: 0
TOTAL SCORE: 0
CONSUMPTION TOTAL: NEGATIVE
EVER HAD A DRINK FIRST THING IN THE MORNING TO STEADY YOUR NERVES TO GET RID OF A HANGOVER: NO
ON A TYPICAL DAY WHEN YOU DRINK ALCOHOL HOW MANY DRINKS DO YOU HAVE: 0
ALCOHOL_USE: NO
HAVE PEOPLE ANNOYED YOU BY CRITICIZING YOUR DRINKING: NO
EVER_SMOKED: YES
TOTAL SCORE: 0
EVER FELT BAD OR GUILTY ABOUT YOUR DRINKING: NO
HOW MANY TIMES IN THE PAST YEAR HAVE YOU HAD 5 OR MORE DRINKS IN A DAY: 0

## 2019-12-28 LAB
ALBUMIN SERPL BCP-MCNC: 4.2 G/DL (ref 3.2–4.9)
ALBUMIN/GLOB SERPL: 1.4 G/DL
ALP SERPL-CCNC: 53 U/L (ref 30–99)
ALT SERPL-CCNC: 8 U/L (ref 2–50)
ANION GAP SERPL CALC-SCNC: 12 MMOL/L (ref 0–11.9)
ANISOCYTOSIS BLD QL SMEAR: ABNORMAL
APPEARANCE UR: CLEAR
AST SERPL-CCNC: 9 U/L (ref 12–45)
BACTERIA #/AREA URNS HPF: ABNORMAL /HPF
BASOPHILS # BLD AUTO: 0.9 % (ref 0–1.8)
BASOPHILS # BLD: 0.2 K/UL (ref 0–0.12)
BILIRUB SERPL-MCNC: 0.6 MG/DL (ref 0.1–1.5)
BILIRUB UR QL STRIP.AUTO: NEGATIVE
BUN SERPL-MCNC: 20 MG/DL (ref 8–22)
CALCIUM SERPL-MCNC: 9.4 MG/DL (ref 8.5–10.5)
CHLORIDE SERPL-SCNC: 101 MMOL/L (ref 96–112)
CO2 SERPL-SCNC: 23 MMOL/L (ref 20–33)
COLOR UR: YELLOW
CREAT SERPL-MCNC: 1.2 MG/DL (ref 0.5–1.4)
EOSINOPHIL # BLD AUTO: 0.2 K/UL (ref 0–0.51)
EOSINOPHIL NFR BLD: 0.9 % (ref 0–6.9)
EPI CELLS #/AREA URNS HPF: NEGATIVE /HPF
ERYTHROCYTE [DISTWIDTH] IN BLOOD BY AUTOMATED COUNT: 43.9 FL (ref 35.9–50)
EST. AVERAGE GLUCOSE BLD GHB EST-MCNC: 177 MG/DL
GLOBULIN SER CALC-MCNC: 2.9 G/DL (ref 1.9–3.5)
GLUCOSE BLD-MCNC: 123 MG/DL (ref 65–99)
GLUCOSE BLD-MCNC: 128 MG/DL (ref 65–99)
GLUCOSE BLD-MCNC: 132 MG/DL (ref 65–99)
GLUCOSE BLD-MCNC: 137 MG/DL (ref 65–99)
GLUCOSE BLD-MCNC: 142 MG/DL (ref 65–99)
GLUCOSE SERPL-MCNC: 133 MG/DL (ref 65–99)
GLUCOSE UR STRIP.AUTO-MCNC: >=1000 MG/DL
HBA1C MFR BLD: 7.8 % (ref 0–5.6)
HCT VFR BLD AUTO: 38.2 % (ref 42–52)
HGB BLD-MCNC: 12.4 G/DL (ref 14–18)
HYALINE CASTS #/AREA URNS LPF: ABNORMAL /LPF
KETONES UR STRIP.AUTO-MCNC: ABNORMAL MG/DL
LACTATE BLD-SCNC: 1.1 MMOL/L (ref 0.5–2)
LEUKOCYTE ESTERASE UR QL STRIP.AUTO: NEGATIVE
LYMPHOCYTES # BLD AUTO: 2.35 K/UL (ref 1–4.8)
LYMPHOCYTES NFR BLD: 10.4 % (ref 22–41)
MANUAL DIFF BLD: NORMAL
MCH RBC QN AUTO: 29.3 PG (ref 27–33)
MCHC RBC AUTO-ENTMCNC: 32.5 G/DL (ref 33.7–35.3)
MCV RBC AUTO: 90.3 FL (ref 81.4–97.8)
MICRO URNS: ABNORMAL
MONOCYTES # BLD AUTO: 2.55 K/UL (ref 0–0.85)
MONOCYTES NFR BLD AUTO: 11.3 % (ref 0–13.4)
MORPHOLOGY BLD-IMP: NORMAL
NEUTROPHILS # BLD AUTO: 17.29 K/UL (ref 1.82–7.42)
NEUTROPHILS NFR BLD: 76.5 % (ref 44–72)
NITRITE UR QL STRIP.AUTO: POSITIVE
NRBC # BLD AUTO: 0 K/UL
NRBC BLD-RTO: 0 /100 WBC
OVALOCYTES BLD QL SMEAR: NORMAL
PH UR STRIP.AUTO: 5 [PH] (ref 5–8)
PLATELET # BLD AUTO: 287 K/UL (ref 164–446)
PLATELET BLD QL SMEAR: NORMAL
PMV BLD AUTO: 9.8 FL (ref 9–12.9)
POIKILOCYTOSIS BLD QL SMEAR: NORMAL
POLYCHROMASIA BLD QL SMEAR: NORMAL
POTASSIUM SERPL-SCNC: 3.9 MMOL/L (ref 3.6–5.5)
PROCALCITONIN SERPL-MCNC: 0.11 NG/ML
PROT SERPL-MCNC: 7.1 G/DL (ref 6–8.2)
PROT UR QL STRIP: 30 MG/DL
RBC # BLD AUTO: 4.23 M/UL (ref 4.7–6.1)
RBC # URNS HPF: ABNORMAL /HPF
RBC BLD AUTO: PRESENT
RBC UR QL AUTO: ABNORMAL
SODIUM SERPL-SCNC: 136 MMOL/L (ref 135–145)
SP GR UR STRIP.AUTO: 1.04
UROBILINOGEN UR STRIP.AUTO-MCNC: 1 MG/DL
WBC # BLD AUTO: 22.6 K/UL (ref 4.8–10.8)
WBC #/AREA URNS HPF: ABNORMAL /HPF

## 2019-12-28 PROCEDURE — 99215 OFFICE O/P EST HI 40 MIN: CPT | Performed by: PSYCHIATRY & NEUROLOGY

## 2019-12-28 PROCEDURE — 82962 GLUCOSE BLOOD TEST: CPT

## 2019-12-28 PROCEDURE — 700102 HCHG RX REV CODE 250 W/ 637 OVERRIDE(OP): Performed by: FAMILY MEDICINE

## 2019-12-28 PROCEDURE — A9270 NON-COVERED ITEM OR SERVICE: HCPCS | Performed by: FAMILY MEDICINE

## 2019-12-28 PROCEDURE — 83036 HEMOGLOBIN GLYCOSYLATED A1C: CPT

## 2019-12-28 PROCEDURE — 81001 URINALYSIS AUTO W/SCOPE: CPT

## 2019-12-28 PROCEDURE — 87086 URINE CULTURE/COLONY COUNT: CPT

## 2019-12-28 PROCEDURE — 97166 OT EVAL MOD COMPLEX 45 MIN: CPT

## 2019-12-28 PROCEDURE — 700111 HCHG RX REV CODE 636 W/ 250 OVERRIDE (IP): Performed by: FAMILY MEDICINE

## 2019-12-28 PROCEDURE — 700102 HCHG RX REV CODE 250 W/ 637 OVERRIDE(OP): Performed by: NURSE PRACTITIONER

## 2019-12-28 PROCEDURE — 83605 ASSAY OF LACTIC ACID: CPT

## 2019-12-28 PROCEDURE — 85027 COMPLETE CBC AUTOMATED: CPT

## 2019-12-28 PROCEDURE — 96372 THER/PROPH/DIAG INJ SC/IM: CPT

## 2019-12-28 PROCEDURE — 80053 COMPREHEN METABOLIC PANEL: CPT

## 2019-12-28 PROCEDURE — 700105 HCHG RX REV CODE 258: Performed by: NURSE PRACTITIONER

## 2019-12-28 PROCEDURE — 99226 PR SUBSEQUENT OBSERVATION CARE,LEVEL III: CPT | Performed by: INTERNAL MEDICINE

## 2019-12-28 PROCEDURE — A9270 NON-COVERED ITEM OR SERVICE: HCPCS | Performed by: NURSE PRACTITIONER

## 2019-12-28 PROCEDURE — 84145 PROCALCITONIN (PCT): CPT

## 2019-12-28 PROCEDURE — 87186 SC STD MICRODIL/AGAR DIL: CPT

## 2019-12-28 PROCEDURE — 87077 CULTURE AEROBIC IDENTIFY: CPT

## 2019-12-28 PROCEDURE — G0378 HOSPITAL OBSERVATION PER HR: HCPCS

## 2019-12-28 PROCEDURE — 97162 PT EVAL MOD COMPLEX 30 MIN: CPT

## 2019-12-28 PROCEDURE — 85007 BL SMEAR W/DIFF WBC COUNT: CPT

## 2019-12-28 RX ORDER — ACETAMINOPHEN 325 MG/1
650 TABLET ORAL EVERY 4 HOURS PRN
Status: DISCONTINUED | OUTPATIENT
Start: 2019-12-28 | End: 2019-12-31 | Stop reason: HOSPADM

## 2019-12-28 RX ORDER — SODIUM CHLORIDE 9 MG/ML
INJECTION, SOLUTION INTRAVENOUS CONTINUOUS
Status: DISCONTINUED | OUTPATIENT
Start: 2019-12-28 | End: 2019-12-31 | Stop reason: HOSPADM

## 2019-12-28 RX ADMIN — MONTELUKAST 10 MG: 10 TABLET, FILM COATED ORAL at 06:00

## 2019-12-28 RX ADMIN — ACETAMINOPHEN 650 MG: 325 TABLET, FILM COATED ORAL at 18:14

## 2019-12-28 RX ADMIN — BUSPIRONE HYDROCHLORIDE 10 MG: 10 TABLET ORAL at 18:14

## 2019-12-28 RX ADMIN — ATORVASTATIN CALCIUM 80 MG: 80 TABLET, FILM COATED ORAL at 18:14

## 2019-12-28 RX ADMIN — FAMOTIDINE 20 MG: 20 TABLET ORAL at 20:36

## 2019-12-28 RX ADMIN — ASPIRIN 81 MG: 81 TABLET, COATED ORAL at 05:56

## 2019-12-28 RX ADMIN — DIVALPROEX SODIUM 500 MG: 500 TABLET, DELAYED RELEASE ORAL at 05:58

## 2019-12-28 RX ADMIN — MELATONIN 2000 UNITS: at 06:12

## 2019-12-28 RX ADMIN — BUSPIRONE HYDROCHLORIDE 10 MG: 10 TABLET ORAL at 13:30

## 2019-12-28 RX ADMIN — SERTRALINE HYDROCHLORIDE 100 MG: 100 TABLET ORAL at 06:01

## 2019-12-28 RX ADMIN — ENOXAPARIN SODIUM 40 MG: 100 INJECTION SUBCUTANEOUS at 05:56

## 2019-12-28 RX ADMIN — LEVOTHYROXINE SODIUM 200 MCG: 200 TABLET ORAL at 06:00

## 2019-12-28 RX ADMIN — FAMOTIDINE 20 MG: 20 TABLET ORAL at 10:02

## 2019-12-28 RX ADMIN — DIVALPROEX SODIUM 1500 MG: 500 TABLET, DELAYED RELEASE ORAL at 18:15

## 2019-12-28 RX ADMIN — BUSPIRONE HYDROCHLORIDE 10 MG: 10 TABLET ORAL at 05:57

## 2019-12-28 RX ADMIN — LATANOPROST 1 DROP: 50 SOLUTION OPHTHALMIC at 20:36

## 2019-12-28 RX ADMIN — SENNOSIDES AND DOCUSATE SODIUM 2 TABLET: 8.6; 5 TABLET ORAL at 18:14

## 2019-12-28 RX ADMIN — FENOFIBRATE 134 MG: 134 CAPSULE ORAL at 05:59

## 2019-12-28 RX ADMIN — SODIUM CHLORIDE: 9 INJECTION, SOLUTION INTRAVENOUS at 16:46

## 2019-12-28 RX ADMIN — INSULIN GLARGINE 20 UNITS: 100 INJECTION, SOLUTION SUBCUTANEOUS at 18:17

## 2019-12-28 ASSESSMENT — ENCOUNTER SYMPTOMS
PALPITATIONS: 0
CONSTIPATION: 0
DIZZINESS: 0
MYALGIAS: 0
NAUSEA: 0
FEVER: 0
ABDOMINAL PAIN: 0
SPEECH CHANGE: 1
FOCAL WEAKNESS: 1
COUGH: 0
TINGLING: 0
DIARRHEA: 0
DOUBLE VISION: 0
SENSORY CHANGE: 1
CHILLS: 0
BLURRED VISION: 0
HEADACHES: 0
VOMITING: 0
TREMORS: 0

## 2019-12-28 ASSESSMENT — COGNITIVE AND FUNCTIONAL STATUS - GENERAL
MOBILITY SCORE: 9
STANDING UP FROM CHAIR USING ARMS: TOTAL
HELP NEEDED FOR BATHING: A LOT
WALKING IN HOSPITAL ROOM: TOTAL
DAILY ACTIVITIY SCORE: 15
SUGGESTED CMS G CODE MODIFIER DAILY ACTIVITY: CK
TOILETING: A LOT
CLIMB 3 TO 5 STEPS WITH RAILING: TOTAL
MOVING FROM LYING ON BACK TO SITTING ON SIDE OF FLAT BED: A LOT
MOVING TO AND FROM BED TO CHAIR: A LOT
TURNING FROM BACK TO SIDE WHILE IN FLAT BAD: A LOT
DRESSING REGULAR UPPER BODY CLOTHING: A LOT
SUGGESTED CMS G CODE MODIFIER MOBILITY: CM
DRESSING REGULAR LOWER BODY CLOTHING: A LOT
PERSONAL GROOMING: A LITTLE

## 2019-12-28 ASSESSMENT — PATIENT HEALTH QUESTIONNAIRE - PHQ9
1. LITTLE INTEREST OR PLEASURE IN DOING THINGS: NOT AT ALL
SUM OF ALL RESPONSES TO PHQ9 QUESTIONS 1 AND 2: 0
2. FEELING DOWN, DEPRESSED, IRRITABLE, OR HOPELESS: NOT AT ALL
1. LITTLE INTEREST OR PLEASURE IN DOING THINGS: NOT AT ALL
SUM OF ALL RESPONSES TO PHQ9 QUESTIONS 1 AND 2: 0
2. FEELING DOWN, DEPRESSED, IRRITABLE, OR HOPELESS: NOT AT ALL

## 2019-12-28 ASSESSMENT — GAIT ASSESSMENTS: GAIT LEVEL OF ASSIST: REFUSED

## 2019-12-28 ASSESSMENT — ACTIVITIES OF DAILY LIVING (ADL): TOILETING: INDEPENDENT

## 2019-12-28 NOTE — CONSULTS
"Neurology Initial Consult H&P  Neurohospitalist Service, [facility]    Referring Physician: Lola Lopez M.D.    No chief complaint on file.      HPI: oNrm Prabhakar is a 39 y.o. male with history of seizures with acute weakness    Review of systems: In addition to what is detailed in the HPI above, (and scanned into the chart if and when applicable), all other systems reviewed and are negative.    Past Medical History:    has a past medical history of Anxiety, ASTHMA, Bipolar 1 disorder (HCC), Depression, Fall, Glaucoma, Glaucoma (1982), Hypothyroidism, Indigestion, Mental disorder, Murmur, Pneumonia, Psychiatric problem (2002), S/P thyroidectomy, Seizure (HCC) (2010), Seizure disorder (HCC), and Unspecified disorder of thyroid.    FHx:  family history includes Heart Disease in his mother; Hypertension in his mother; Lung Disease in his mother; Stroke in his maternal grandmother.    SHx:   reports that he quit smoking about 19 months ago. His smoking use included cigarettes and cigars. He smoked 0.25 packs per day. He has never used smokeless tobacco. He reports current drug use. Drug: Inhaled. He reports that he does not drink alcohol.    Allergies:  Allergies   Allergen Reactions   • Abilify Unspecified     \"Feeling tired, like I don't even know whats going on around me\"   • Fish      Pt reports fish causes him to be sick to his stomach         Medications:  No current facility-administered medications for this encounter.     Current Outpatient Medications:   •  fenofibrate (ANTARA) 130 MG capsule, Take 130 mg by mouth every morning., Disp: , Rfl:   •  Empagliflozin (JARDIANCE) 25 MG Tab, Take  by mouth., Disp: , Rfl:   •  lisinopril (PRINIVIL) 10 MG Tab, Take 10 mg by mouth every day., Disp: , Rfl:   •  triamcinolone acetonide (KENALOG) 0.1 % Cream, Apply  to affected area(s) 2 times a day., Disp: , Rfl:   •  Cholecalciferol (VITAMIN D3) 3000 units Tab, Take  by mouth., Disp: , Rfl: "   •  ondansetron (ZOFRAN ODT) 4 MG TABLET DISPERSIBLE, Take 1 Tab by mouth every 6 hours as needed., Disp: 5 Tab, Rfl: 0  •  ibuprofen (MOTRIN) 200 MG Tab, Take 200 mg by mouth every 6 hours as needed for Headache or Mild Pain., Disp: , Rfl:   •  lidocaine (XYLOCAINE) 5 % Ointment, Apply 1 Application to affected area(s) 2 Times a Day. applied to lower back, Disp: , Rfl:   •  montelukast (SINGULAIR) 10 MG Tab, Take 10 mg by mouth every day., Disp: , Rfl:   •  nystatin (MYCOSTATIN) 171213 UNIT/GM Cream topical cream, Apply 1 Application to affected area(s) 2 times a day., Disp: , Rfl:   •  sertraline (ZOLOFT) 100 MG Tab, Take 1.5 Tabs by mouth every day., Disp: 30 Tab, Rfl: 0  •  aspirin EC (ECOTRIN) 81 MG Tablet Delayed Response, Take 81 mg by mouth every day., Disp: , Rfl:   •  atorvastatin (LIPITOR) 80 MG tablet, Take 80 mg by mouth every evening., Disp: , Rfl:   •  insulin glargine (BASAGLAR KWIKPEN) 100 UNIT/ML Solution Pen-injector injection, Inject 20 Units as instructed every evening., Disp: , Rfl:   •  busPIRone (BUSPAR) 10 MG Tab tablet, Take 10 mg by mouth 2 times a day., Disp: , Rfl:   •  divalproex (DEPAKOTE) 500 MG Tablet Delayed Response, Take 500 mg by mouth 2 Times a Day., Disp: , Rfl:   •  glimepiride (AMARYL) 4 MG Tab, Take 4 mg by mouth every morning., Disp: , Rfl:   •  levothyroxine (SYNTHROID) 150 MCG Tab, Take 200 mcg by mouth Every morning on an empty stomach., Disp: , Rfl:   •  metFORMIN (GLUCOPHAGE) 500 MG Tab, Take 500 mg by mouth 2 times a day., Disp: , Rfl:   •  prazosin (MINIPRESS) 2 MG Cap, Take 2 mg by mouth every evening., Disp: , Rfl:   •  raNITidine (ZANTAC) 150 MG Tab, Take 150 mg by mouth 2 times a day., Disp: , Rfl:     Physical Examination:     Vitals:    12/27/19 1801   BP: 105/64   Pulse: (!) 103   SpO2: 93%       General: Patient is awake and in no acute distress  Eyes: examination of optic disks not indicated at this time  CV: RRR    NEUROLOGICAL EXAM:     Mental status:  Awake, alert and fully oriented, follows commands  Speech and language: speech is slurred. The patient is able to name and repeat.  Cranial nerve exam: Pupils are equal, round and reactive to light bilaterally. Visual fields are full. Extraocular muscles are intact. Sensation in the face is intact to light touch. Face is symmetric. Hearing to finger rub equal. Palate elevates symmetrically. Shoulder shrug is full. Tongue is midline.  Motor exam: Strength is 5/5 in  Left right 0/5 effort right UE LE with subjectvive sensory loss right all extremities both distally and proximally. Tone is normal. No abnormal movements were seen on exam.  Sensory exam:  Deep tendon reflexes:  2+ and symmetric. Toes down-going bilaterally.  Coordination: no ataxia   Gait: deferred not tested    Objective Data:    Labs:  Lab Results   Component Value Date/Time    PROTHROMBTM 14.0 12/27/2019 05:30 PM    INR 1.06 12/27/2019 05:30 PM      Lab Results   Component Value Date/Time    WBC 22.6 (H) 12/27/2019 05:30 PM    RBC 4.74 12/27/2019 05:30 PM    HEMOGLOBIN 13.7 (L) 12/27/2019 05:30 PM    HEMATOCRIT 42.9 12/27/2019 05:30 PM    MCV 90.5 12/27/2019 05:30 PM    MCH 28.9 12/27/2019 05:30 PM    MCHC 31.9 (L) 12/27/2019 05:30 PM    MPV 10.1 12/27/2019 05:30 PM    NEUTSPOLYS 77.10 (H) 12/27/2019 05:30 PM    LYMPHOCYTES 10.80 (L) 12/27/2019 05:30 PM    MONOCYTES 10.70 12/27/2019 05:30 PM    EOSINOPHILS 0.00 12/27/2019 05:30 PM    BASOPHILS 0.50 12/27/2019 05:30 PM    ANISOCYTOSIS 1+ 06/05/2019 08:09 PM      Lab Results   Component Value Date/Time    SODIUM 135 12/27/2019 05:30 PM    POTASSIUM 4.2 12/27/2019 05:30 PM    CHLORIDE 100 12/27/2019 05:30 PM    CO2 23 12/27/2019 05:30 PM    GLUCOSE 130 (H) 12/27/2019 05:30 PM    BUN 20 12/27/2019 05:30 PM    CREATININE 1.14 12/27/2019 05:30 PM      Lab Results   Component Value Date/Time    CHOLSTRLTOT 171 07/24/2019 03:18 AM    LDL see below 07/24/2019 03:18 AM    HDL 29 (A) 07/24/2019 03:18 AM     TRIGLYCERIDE 597 (H) 07/24/2019 03:18 AM       Lab Results   Component Value Date/Time    ALKPHOSPHAT 55 12/27/2019 05:30 PM    ASTSGOT 13 12/27/2019 05:30 PM    ALTSGPT 10 12/27/2019 05:30 PM    TBILIRUBIN 0.5 12/27/2019 05:30 PM        Imaging/Testing:  CT CTA reviewed without obvious lesion. MRI 7/2019 reviewed showing extensive leukoariosis cerebral atrophy    Assessment and Plan:    Norm Prabhakar is a 39 y.o. male with relevant history of seizures and mood disorder with new right weakness and numbness. Went to bed 12:30 pm woke up 5 pm with sxs.  Outside IV TPA window. CT CTA without evidence of LVO. I do not see lesion for IR to fix.  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 Given hx of seizures and mood disorder I suspect MELAS over CADASIL.  Other genetic metabolic conditions ie storage diseases or enxzyme deficiencies possible. Rec MRI brain without bhavik to make sure no new stroke  Rec lacate and pyruvate serum. Rec genetic metabolic leukodystrophy panel ie MELAS and NOTCH 3, possibly aryl sulfatase GAA and GLA enzyme    Plan:    The evaluation of the patient, and recommended management, was discussed with the resident staff.     Jose Matias MD  Board Certified Neurology, ABPN  Board Certified Vascular Neurology, ABPN  (t) [phone]

## 2019-12-28 NOTE — ED NOTES
Med Rec Updated and Complete per Pt at bedside  Allergies Reviewed  No PO ABX last 14 days.    Pt taking LD ASA daily.

## 2019-12-28 NOTE — DISCHARGE PLANNING
Pt said he lives at [facility]  Since October 2018 paid for by Medicaid. Mother live in [place] , her phone number is [phone].     Discussed discharge planning with Bryn PRETTY who recommends SNF as per Neuro. Talked to pt , explained to him that it would be better if we sent  a blanket referral to all the SNF in [place] due to his insurance. He agreed and signed the choice form.     Faxed choice form to [facility].     Care Transition Team Assessment    Information Source  Orientation : Oriented x 4  Information Given By: Patient  Who is responsible for making decisions for patient? : Patient         Elopement Risk  Legal Hold: No  Ambulatory or Self Mobile in Wheelchair: No-Not an Elopement Risk  Elopement Risk: Not at Risk for Elopement    Interdisciplinary Discharge Planning  Does Admitting Nurse Feel This Could be a Complex Discharge?: No  Primary Care Physician: Dr. Krystin Gupta  Lives with - Patient's Self Care Capacity: Attendant / Paid Care Giver  Patient or legal guardian wants to designate a caregiver (see row info): No  Support Systems: Parent  Housing / Facility: [facility]  Name of Care Facility: [facility]  Do You Take your Prescribed Medications Regularly: Yes  Able to Return to Previous ADL's: Future Time w/Therapy  Mobility Issues: Yes  Prior Services: [facility]  Patient Expects to be Discharged to:: SNF  Assistance Needed: Yes  Durable Medical Equipment: Walker    Discharge Preparedness  What is your plan after discharge?: Skilled nursing facility  What are your discharge supports?: Parent  Prior Functional Level: Ambulatory, Needs Assist with Activities of Daily Living, Needs Assist with Medication Management, Uses Walker  Difficulity with ADLs: Bathing, Dressing, Walking  Difficulity with IADLs: Driving, Laundry    Functional Assesment  Prior Functional Level: Ambulatory, Needs Assist with Activities of Daily Living, Needs Assist with Medication Management, Uses  Walker    Finances  Financial Barriers to Discharge: No  Prescription Coverage: Yes    Vision / Hearing Impairment  Vision Impairment : Yes  Right Eye Vision: Wears Glasses  Left Eye Vision: Blind  Hearing Impairment : Yes  Hearing Impairment: Both Ears  Does Pt Need Special Equipment for the Hearing Impaired?: No         Advance Directive  Advance Directive?: None  Advance Directive offered?: AD Booklet refused    Domestic Abuse  Have you ever been the victim of abuse or violence?: No  Physical Abuse or Sexual Abuse: No  Verbal Abuse or Emotional Abuse: No  Possible Abuse Reported to:: Not Applicable         Discharge Risks or Barriers  Discharge risks or barriers?: Complex medical needs    Anticipated Discharge Information  Anticipated discharge disposition: SNF

## 2019-12-28 NOTE — PROGRESS NOTES
Assumed care pt. from previous shift. Patient alert oriented x4, assist of two. Patient has peripheral IV on R and L arm SL clean, dry and intact. Patient is on Tele monitor with SR - ST.  Continues on O2 2L via nasal canula, tolerating well. On cardiac diet. Assessment complete. Answered all questions and concern regarding care. Safety precaution & hourly rounding in place.

## 2019-12-28 NOTE — ASSESSMENT & PLAN NOTE
MRI brain negative for acute stroke  Right hemiparesis likely secondary to MELAS vs CADASIL   Plan as above  CVA r/o

## 2019-12-28 NOTE — PROGRESS NOTES
[facility] Medicine Daily Progress Note    Date of Service  12/28/2019    Chief Complaint  39 y.o. male admitted 12/27/2019 with right-side weakness.     Hospital Course    This is a 38-year-old male with a past medical history of bipolar disorder, mental disorder, glaucoma, hypothyroidism, seizure disorder, diabetes, anxiety, asthma admitted to the ED with complaints of new onset right-sided weakness.  He was evaluated by neurology in the ED and determined not to be a TPA candidate.  CT head was unremarkable.  CTA head and neck were within normal limits.  MRI brain was negative for stroke but did reveal findings consistent with genetic leukodystrophy.  Pending work-up for MELAS.      Interval Problem Update  12/28: Continues to complain of right upper and lower extremity paralysis.  Continuing with physical and Occupational Therapy.  He is noted to have positive reflexes to the right side as well as muscle contraction when working with therapy despite his reports of being unable to move the right side.  These findings are more consistent with MELAS.  He will likely need aggressive therapies.  Denies acute pain, shortness of breath, nausea, or vomiting.    Consultants/Specialty  Neurology     Code Status  FULL    Disposition  SNF referral placed.  Transfer to neurology.    Review of Systems  Review of Systems   Constitutional: Positive for malaise/fatigue. Negative for chills and fever.   HENT: Negative for congestion.    Eyes: Negative for blurred vision and double vision.   Respiratory: Negative for cough.    Cardiovascular: Negative for chest pain, palpitations and leg swelling.   Gastrointestinal: Negative for abdominal pain, constipation, diarrhea, nausea and vomiting.   Genitourinary: Negative for dysuria.   Musculoskeletal: Negative for myalgias.   Skin: Negative for itching and rash.   Neurological: Positive for sensory change, speech change and focal weakness. Negative for dizziness, tingling, tremors and  headaches.        Physical Exam  Temp:  [36.3 °C (97.3 °F)-38 °C (100.4 °F)] 36.7 °C (98.1 °F)  Pulse:  [] 80  Resp:  [14-20] 14  BP: (102-127)/(54-71) 118/65  SpO2:  [92 %-96 %] 92 %    Physical Exam  Vitals signs and nursing note reviewed.   Constitutional:       General: He is not in acute distress.     Appearance: Normal appearance. He is not ill-appearing.   HENT:      Head: Normocephalic and atraumatic.      Right Ear: External ear normal.      Left Ear: External ear normal.      Nose: Nose normal. No congestion.      Mouth/Throat:      Mouth: Mucous membranes are moist.      Pharynx: Oropharynx is clear. No oropharyngeal exudate.   Eyes:      General:         Right eye: No discharge.         Left eye: No discharge.      Conjunctiva/sclera: Conjunctivae normal.      Pupils: Pupils are equal, round, and reactive to light.   Neck:      Musculoskeletal: Normal range of motion. No neck rigidity.   Cardiovascular:      Rate and Rhythm: Normal rate and regular rhythm.      Pulses: Normal pulses.      Heart sounds: Normal heart sounds. No murmur.   Pulmonary:      Effort: Pulmonary effort is normal. No respiratory distress.      Breath sounds: Normal breath sounds. No wheezing.   Abdominal:      General: Abdomen is flat. Bowel sounds are normal. There is no distension.      Tenderness: There is no tenderness.   Musculoskeletal:      Right lower leg: No edema.      Left lower leg: No edema.   Skin:     General: Skin is warm and dry.      Capillary Refill: Capillary refill takes less than 2 seconds.      Coloration: Skin is not pale.   Neurological:      Mental Status: He is alert and oriented to person, place, and time.      Sensory: Sensory deficit present.      Coordination: Coordination abnormal.      Gait: Gait abnormal.      Comments: RUE/RLE ROM 1/5  LUE/LLE ROM 5/5  Slowed speech   Psychiatric:      Comments: Underlying mental disorder.  Flat affect.           Fluids    Intake/Output Summary (Last 24  hours) at 12/28/2019 1416  Last data filed at 12/28/2019 0002  Gross per 24 hour   Intake 720 ml   Output --   Net 720 ml       Laboratory  Recent Labs     12/27/19  1730 12/28/19  0325   WBC 22.6* 22.6*   RBC 4.74 4.23*   HEMOGLOBIN 13.7* 12.4*   HEMATOCRIT 42.9 38.2*   MCV 90.5 90.3   MCH 28.9 29.3   MCHC 31.9* 32.5*   RDW 44.1 43.9   PLATELETCT 331 287   MPV 10.1 9.8     Recent Labs     12/27/19  1730 12/28/19  0325   SODIUM 135 136   POTASSIUM 4.2 3.9   CHLORIDE 100 101   CO2 23 23   GLUCOSE 130* 133*   BUN 20 20   CREATININE 1.14 1.20   CALCIUM 9.9 9.4     Recent Labs     12/27/19 1730   APTT 30.5   INR 1.06               Imaging  MR-BRAIN-W/O   Final Result      1.  Moderate diffuse cerebral atrophy.      2.  Stable extensive confluent abnormal increased T2 signal intensity throughout the periventricular and juxtacortical white matter consistent with extensive chronic demyelination or dysmyelination. Additionally a severe toxic metabolic insult could    cause this appearance.      3.  Unchanged elliptical cystic fluid signal intensity collection abutting the left globe superior laterally.      DX-CHEST-PORTABLE (1 VIEW)   Final Result      No acute cardiopulmonary findings.      CT-CTA NECK WITH & W/O-POST PROCESSING   Final Result      1.  No significant stenosis or dissection is identified.      CT-CTA HEAD WITH & W/O-POST PROCESS   Final Result      No large vessel occlusion or aneurysm is identified.      CT-CEREBRAL PERFUSION ANALYSIS   Final Result      1.  Cerebral blood flow less than 30% likely representing completed infarct = 0 mL.      2.  T Max more than 6 seconds likely representing combination of completed infarct and ischemia = 0 mL. 5 mL reported is likely artifactual      3.  Mismatched volume likely representing ischemic brain/penumbra = no significant      4.  Please note that the cerebral perfusion was performed on the limited brain tissue around the basal ganglia region. Infarct/ischemia  outside the CT perfusion sections can be missed in this study.      CT-HEAD W/O   Final Result   Addendum 1 of 1   Addendum:      Confluent white matter changes are similar to multiple prior exams.      Final           Assessment/Plan  Right hemiparesis (HCC)- (present on admission)  Assessment & Plan  CTA head and neck wnl  MRI negative for stroke, but findings consistent with genetic leukodystrophy  Neurology following  High suspicion for MELAS  Blood work pending  Continue PT/OT  SNF referral placed.     Leukocytosis  Assessment & Plan  WBC 22.6  Unknown etiology  CXR unremarkable.   UA pending  Pro calcitonin ordered  No other supporting evidence for infection   Hold antibx for now.   Follow cbc    CVA (cerebral vascular accident) (HCC)  Assessment & Plan  MRI brain negative for acute stroke  Right hemiparesis likely secondary to MELAS  Plan as above  CVA r/o    Type 2 diabetes mellitus without complication, without long-term current use of insulin (HCC)- (present on admission)  Assessment & Plan  Continue lantus and SSI  Diabetic diet  Accuchecks    Seizure (HCC)- (present on admission)  Assessment & Plan  H/O  Continue depakote    Acquired hypothyroidism- (present on admission)  Assessment & Plan  levothyroxine       VTE prophylaxis: Lovenox.

## 2019-12-28 NOTE — RESPIRATORY CARE
COPD EDUCATION by COPD CLINICAL EDUCATOR  12/28/2019 at 7:28 AM by Lupe Milan     Patient reviewed by COPD education team. Patient does not have a history or diagnosis of COPD and is a non-smoker, therefore does not qualify for the COPD program.

## 2019-12-28 NOTE — CARE PLAN
Problem: Communication  Goal: The ability to communicate needs accurately and effectively will improve  Outcome: MET     Problem: Safety  Goal: Will remain free from injury  Outcome: MET     Problem: Pain Management  Goal: Pain level will decrease to patient's comfort goal  Outcome: MET

## 2019-12-28 NOTE — H&P
DATE OF ADMISSION:  12/27/2019    HISTORY OF PRESENT ILLNESS:  This is a 39-year-old male who comes to the   emergency room with a complaint of increased weakness in the right upper   extremity and right lower extremity and right-sided weakness.  Patient states   that it started in the afternoon prior to coming to the emergency room.    However, after the initial conversation, he also stated that he is always weak   on the right side and also unable to move much on the right side as well.  As   per my conversation with the ED physician, apparently the physician has   called Dr. Brady from neurology service.  Recommendation was to admit and also   get an MRI of the head.  Patient also denies any fever.  Denies any chills.    PAST MEDICAL HISTORY:  He has a past medical history of anxiety, asthma,   bipolar disorder, depression, glaucoma, hypothyroidism, mental disorder,   seizure, diabetes.    PAST SURGICAL HISTORY:  Thyroidectomy.    SOCIAL HISTORY:  Denies any tobacco use, admits to occasional alcohol.  Also,   smokes marijuana occasionally.    MEDICATIONS:  On day of admission are:  Glimepiride 4 mg p.o. daily, metformin   1000 mg p.o. 2 times a day, vitamin D 2000 units p.o. daily, Zoloft 100 mg   p.o. daily, Xalatan 1 drop both eyes at bedtime, Travatan one drop in both   eyes at bedtime, fenofibrate 130 mg p.o. daily, Jardiance 25 mg p.o. daily,   lisinopril 10 mg p.o. daily, Singular 10 mg p.o. daily, aspirin 81 mg p.o.   daily, Lipitor 80 mg p.o. daily and Lantus 20 units subcutaneous at bedtime,   BuSpar 10 mg p.o. 3 times a day, Depakote 500-1500 mg p.o. 2 times a day in   a.m., levothyroxine 200 mcg p.o. daily, Prazosin 2 mg p.o. daily, and   ranitidine 150 mg p.o. 2 times a day.    REVIEW OF SYSTEMS:  All 14 systems reviewed, all of them were negative except   what I mentioned in the history of present illness.    PHYSICAL EXAMINATION:  VITAL SIGNS:  Pulse of 103, O2 saturation 92%, blood pressure  125/61.  GENERAL APPEARANCE:  Patient is awake, alert, and oriented x3, morbidly obese.  HEENT:  Neck is supple.  Lips are dry.  CARDIOVASCULAR:  S1, S2, regular.  LUNGS:  Decreased breath sounds, otherwise clear.  ABDOMEN:  Obese, soft, nontender.  EXTREMITIES:  Lower extremities, trace edema along the mid tibia bilaterally.    Also, patient has hemiparesis on the right side.    DIAGNOSTIC IMAGING:  A CTA of the head and neck was done and impression is no   large vessel occlusion or aneurysm is identified.  In the neck, no significant   stenosis or dissection is identified.  Chest x-ray was done and no acute   cardiopulmonary findings.  An MRI without contrast still pending.  An EKG also   was done and shows the patient is in sinus tachycardia.    ASSESSMENT AND PLAN:  This is a 39-year-old male with question of an ischemic   cerebrovascular accident.  1.  Less likely the patient had an acute episode.  2.  We will admit to the telemetry.  3.  Permissive hypertension.  4.  Neurology, Dr. Brady, has been consulted.  5.  MRI of the head is pending.  6.  If the MRI of the head is negative, then we will resume the aspirin 81 mg   p.o. daily.  7.  CTA of the head and neck as noted.  8.  Also, fall precautions, neuro checks q. 4 hours and also we will get a   speech evaluation on the patient.  9.  For diabetes, continue with the Lantus, Jardiance and also sliding scale   insulin and check hemoglobin A1c.  10.  For seizure disorder, we will continue with Depakote and seizure   precaution.  11.  For hypothyroidism, continue with levothyroxine.  12.  For hypertension, if the head MRI was negative for any acute ischemic   cerebrovascular accident, then lisinopril and also prazosin can be resumed.  13.  Deep venous thrombosis prophylaxis.  The patient is on Lovenox 40 mg   subcutaneously daily if the head MRI is negative.       ____________________________________     Juventino Molina MD    [facility]    DD:  12/27/2019  20:48:18  DT:  12/27/2019 22:07:02    D#:  [id number]  Job#:  [id number]

## 2019-12-28 NOTE — CARE PLAN
Problem: Discharge Barriers/Planning  Goal: Patient's continuum of care needs will be met  Outcome: PROGRESSING AS EXPECTED       Admitted for R side paralysis/weakness.    Seen pt, AOx 4. On cardiac monitor with SR/Tachy, R side extremity paralysis, no sensation, speech slow but clear. Plan of care discussed includes Safety, labs, cardiac/neuro monitoring, speech, PT/OT needs? and pt understands.

## 2019-12-28 NOTE — ED NOTES
Pt BIB EMS from assisted living for stroke-like symptoms, per EMS pt fell asleep at 1230 and woke up at 1630 and c/o right side paresthesia/weakness. NIH of 9. Report given to Vicki ANNE.

## 2019-12-28 NOTE — THERAPY
"Occupational Therapy Evaluation completed.   Functional Status:  Reports no movement on right side, though observed to utilize as a stabilizer and perform ROM once distracted  Plan of Care: 3 x weekly to focus on UE function/ strength, ADLS, transfers  Discharge Recommendations:  Equipment: NA, all DME in place   Post-acute therapy Recommend home health transitional care for continued occupational therapy services.     See \"Rehab Therapy-Acute\" Patient Summary Report for complete documentation.    "

## 2019-12-28 NOTE — THERAPY
"Physical Therapy Evaluation completed.   Bed Mobility:  Supine to Sit: Minimal Assist(For right lower extremity management.)  Transfers: Sit to Stand: Refused  Gait: Level Of Assist: Refused with Will Continue to Assess for Equipment Needs       Plan of Care: Will benefit from Physical Therapy 2 times per week  Discharge Recommendations: Equipment: Will Continue to Assess for Equipment Needs.    See \"Rehab Therapy-Acute\" Patient Summary Report for complete documentation.   Mr. Prabhakar presents with reported inability to feel right side and inability to move right side. He states that this happens occasionally and he  has been hospitalized in the past and has even gone to post a post acute setting to gain his strength back, for which he states has to work hard. He has (+) Romero's sign and demonstrates muscle contraction of multiple muscles on his right side during side scooting on the bed. This includes all muscles that he would not fire for a manual muscle test and includes right triceps, wrist/finger flexors and extensors, glute max, hamstrings, and quadriceps. Sensory testing to upper and lower extremity with eyes closed also prompted him to tell me that he could not feel me touching him. His comments coincided with my touch. For these reasons a psych consult may be appropriate.  Since he does report improvement (return to baseline) in the past with help from physical therapy, he will be followed for the time being to assess his willingness to participate and provide an avenue for physical improvement regardless of the cause of his symptoms.   "

## 2019-12-28 NOTE — ED PROVIDER NOTES
ED Provider Note    Scribed for Lola Lopez M.D. by Tabby Hdz. 2019, 5:51 PM.    Primary care provider: ANIKET Davis  Means of arrival: EMS  History obtained from: patient and EMS  History limited by: none    CHIEF COMPLAINT  No chief complaint on file.      HPI  Norm Prabhakar is a 39 y.o. male who presents to the Emergency Department via EMS for evaluate of a stroke. EMS states that the patient was last see normal at 12:30 pm right before falling asleep. He woke up at 5 pm with complaints of right sided weakness and was unable to stand. EMS says that the patient is alert and oriented. He has a past medical history of diabetes and his blood sugar was 128 upon arrival of paramedics. Additionally paramedics noted that his pressure was intermittently dropping. Patient states that he has a history of stroke but is unaware of when his last stroke was.    Per chart review the patient was seen in July for right sided hemiparesis. He had similar presentation prior which was thought to be Todds paralysis vs conversion. He was evaluated by neuro who thought his condition was functional.     REVIEW OF SYSTEMS  Pertinent positives include right sided weakness. Pertinent negatives include no dizziness.  All other systems reviewed and negative.    PAST MEDICAL HISTORY   has a past medical history of Anxiety, ASTHMA, Bipolar 1 disorder (), Depression, Fall, Glaucoma, Glaucoma (), Hypothyroidism, Indigestion, Mental disorder, Murmur, Pneumonia, Psychiatric problem (), S/P thyroidectomy, Seizure (HCC) (), Seizure disorder (), and Unspecified disorder of thyroid.    SURGICAL HISTORY   has a past surgical history that includes eye surgery; thyroid lobectomy; and other.    SOCIAL HISTORY  Social History     Tobacco Use   • Smoking status: Former Smoker     Packs/day: 0.25     Types: Cigarettes, Cigars     Last attempt to quit: 2018     Years since quittin.6   • Smokeless tobacco:  "Never Used   Substance Use Topics   • Alcohol use: No   • Drug use: Yes     Types: Inhaled     Comment: marijuana      Social History     Substance and Sexual Activity   Drug Use Yes   • Types: Inhaled    Comment: marijuana       FAMILY HISTORY  Family History   Problem Relation Age of Onset   • Hypertension Mother    • Heart Disease Mother    • Lung Disease Mother    • Stroke Maternal Grandmother        CURRENT MEDICATIONS  Home Medications     Reviewed by Benjamin Lopez (Pharmacy Tech) on 12/27/19 at 1900  Med List Status: Complete   Medication Last Dose Status   aspirin EC (ECOTRIN) 81 MG Tablet Delayed Response 12/27/2019 Active   atorvastatin (LIPITOR) 80 MG tablet 12/26/2019 Active   busPIRone (BUSPAR) 10 MG Tab tablet 12/27/2019 Active   Cholecalciferol (VITAMIN D) 2000 UNIT Tab 12/27/2019 Active   divalproex (DEPAKOTE) 500 MG Tablet Delayed Response 12/27/2019 Active   Empagliflozin (JARDIANCE) 25 MG Tab 12/27/2019 Active   fenofibrate (ANTARA) 130 MG capsule 12/27/2019 Active   glimepiride (AMARYL) 4 MG Tab 12/27/2019 Active   insulin glargine (BASAGLAR KWIKPEN) 100 UNIT/ML Solution Pen-injector injection 12/26/2019 Active   latanoprost (XALATAN) 0.005 % Solution 12/26/2019 Active   levothyroxine (SYNTHROID) 200 MCG Tab 12/27/2019 Active   lisinopril (PRINIVIL) 10 MG Tab 12/27/2019 Active   metformin (GLUCOPHAGE) 1000 MG tablet 12/27/2019 Active   montelukast (SINGULAIR) 10 MG Tab 12/27/2019 Active   prazosin (MINIPRESS) 2 MG Cap 12/26/2019 Active   raNITidine (ZANTAC) 150 MG Tab 12/27/2019 Active   sertraline (ZOLOFT) 100 MG Tab 12/27/2019 Active   travoprost (TRAVATAN Z) 0.004 % Solution 12/26/2019 Active                ALLERGIES  Allergies   Allergen Reactions   • Abilify Unspecified     \"Feeling tired, like I don't even know whats going on around me\"   • Fish      Pt reports fish causes him to be sick to his stomach         PHYSICAL EXAM  VITAL SIGNS: /68   Pulse (!) 103   SpO2 92% "   Constitutional: Alert in no apparent distress.  HENT: No signs of trauma, Bilateral external ears normal, Nose normal.   Eyes: Pupils are equal and reactive, Conjunctiva normal, Non-icteric.   Neck: Normal range of motion, No tenderness, Supple, No stridor.   Cardiovascular: Regular rate and rhythm, no murmurs.   Thorax & Lungs: Normal breath sounds, No respiratory distress, No wheezing, No chest tenderness.   Abdomen: Bowel sounds normal, Soft, No tenderness, No masses, No peritoneal signs.  Skin: Warm, Dry, No erythema, No rash.   Musculoskeletal:  No major deformities noted.   Neurologic: Right upper extremity and right lower extremity weakness exam is inconsistent no facial droop patient does protect his face when his arm is held above his head.  NIH stroke scale of 9    LABS  Labs Reviewed   CBC WITH DIFFERENTIAL - Abnormal; Notable for the following components:       Result Value    WBC 22.6 (*)     Hemoglobin 13.7 (*)     MCHC 31.9 (*)     Neutrophils-Polys 77.10 (*)     Lymphocytes 10.80 (*)     Neutrophils (Absolute) 17.44 (*)     Monos (Absolute) 2.41 (*)     Immature Granulocytes (abs) 0.20 (*)     All other components within normal limits    Narrative:     Indicate which anticoagulants the patient is on:->UNKNOWN   COMP METABOLIC PANEL - Abnormal; Notable for the following components:    Anion Gap 12.0 (*)     Glucose 130 (*)     All other components within normal limits    Narrative:     Indicate which anticoagulants the patient is on:->UNKNOWN   PROTHROMBIN TIME    Narrative:     Indicate which anticoagulants the patient is on:->UNKNOWN   APTT    Narrative:     Indicate which anticoagulants the patient is on:->UNKNOWN   COD (ADULT)   TROPONIN    Narrative:     Indicate which anticoagulants the patient is on:->UNKNOWN   ESTIMATED GFR    Narrative:     Indicate which anticoagulants the patient is on:->UNKNOWN   HEMOGLOBIN A1C     All labs reviewed by me.    EKG  12 Lead EKG interpreted by me to  show:  Normal sinus rhythm  Rate 100 tachycardic  Axis: Normal  Intervals: Normal  Normal T waves  Normal ST segments  My impression of this EKG: Tachycardia, does not indicate ischemia at this time.    RADIOLOGY  DX-CHEST-PORTABLE (1 VIEW)   Final Result      No acute cardiopulmonary findings.      CT-CTA NECK WITH & W/O-POST PROCESSING   Final Result      1.  No significant stenosis or dissection is identified.      CT-CTA HEAD WITH & W/O-POST PROCESS   Final Result      No large vessel occlusion or aneurysm is identified.      CT-CEREBRAL PERFUSION ANALYSIS   Final Result      1.  Cerebral blood flow less than 30% likely representing completed infarct = 0 mL.      2.  T Max more than 6 seconds likely representing combination of completed infarct and ischemia = 0 mL. 5 mL reported is likely artifactual      3.  Mismatched volume likely representing ischemic brain/penumbra = no significant      4.  Please note that the cerebral perfusion was performed on the limited brain tissue around the basal ganglia region. Infarct/ischemia outside the CT perfusion sections can be missed in this study.      CT-HEAD W/O   Final Result   Addendum 1 of 1   Addendum:      Confluent white matter changes are similar to multiple prior exams.      Final      MR-BRAIN-W/O    (Results Pending)     The radiologist's interpretation of all radiological studies have been reviewed by me.    COURSE & MEDICAL DECISION MAKING  Pertinent Labs & Imaging studies reviewed. (See chart for details)    5:51 PM - Patient seen and examined at bedside. Patient is not an Alteplase candidate given onset of symptoms. Patient will be treated with Omnipaque 30 mg. Ordered chest x-ray, CT headm CT cerebral perfusion, CT CTA head, CT CTA Neck, CBC w/ diff, CMP, PTT, APTT, COD, troponin, EKG to evaluate his symptoms.     6:12 PM Patient was consulted by neurology who believes the patient has a mitochondrial disorder and recommended the patient be  admitted.    7:15 PM - I discussed the patient's case and the above findings with Dr. Molina (Hospitalist) who agreed to evaluate patient for hospitalization.      Decision Making:  This is a 39 y.o. year old male who presents with right-sided weakness.  His exam is difficult to interpret he does protect his face when his arm is held above his head against gravity.  Prior records indicate that he is been admitted for this before with an MRI negative for stroke.  Patient was seen by neurology he is not an alteplase candidate given that he woke up with the symptoms.  He has an MRI in the past that question leukodystrophy.  Patient will be admitted for another MRI to evaluate for possible new stroke and further work-up.    DISPOSITION:  Patient will be hospitalized by Dr. Molina in guarded condition.    FINAL IMPRESSION  1. Right sided weakness          This dictation has been created using voice recognition software and/or scribes. The accuracy of the dictation is limited by the abilities of the software and the expertise of the scribes. I expect there may be some errors of grammar and possibly content. I made every attempt to manually correct the errors within my dictation. However, errors related to voice recognition software and/or scribes may still exist and should be interpreted within the appropriate context.     I, Tabby Hdz (Scribe), am scribing for, and in the presence of, Lola Lopez M.D..    Electronically signed by: Tabby Hdz (Robertibcarmita), 12/27/2019    ILola M.D. personally performed the services described in this documentation, as scribed by Tabby Hdz in my presence, and it is both accurate and complete. C    The note accurately reflects work and decisions made by me.  Lola Lopez  12/27/2019  9:31 PM

## 2019-12-28 NOTE — ASSESSMENT & PLAN NOTE
CTA head and neck wnl  MRI negative for stroke, but findings consistent with genetic leukodystrophy  EEG negative  Neurology following  Work-up for MELAS vs CADASIL pending  Inconsistent neurological exam.  May have psychiatric component  Continue PT/OT  Awaiting SNF placement.

## 2019-12-29 PROBLEM — N39.0 UTI (URINARY TRACT INFECTION): Status: ACTIVE | Noted: 2019-12-29

## 2019-12-29 LAB
ANION GAP SERPL CALC-SCNC: 9 MMOL/L (ref 0–11.9)
BASOPHILS # BLD AUTO: 0.3 % (ref 0–1.8)
BASOPHILS # BLD: 0.06 K/UL (ref 0–0.12)
BUN SERPL-MCNC: 25 MG/DL (ref 8–22)
CALCIUM SERPL-MCNC: 8.3 MG/DL (ref 8.5–10.5)
CHLORIDE SERPL-SCNC: 102 MMOL/L (ref 96–112)
CO2 SERPL-SCNC: 23 MMOL/L (ref 20–33)
CREAT SERPL-MCNC: 1.16 MG/DL (ref 0.5–1.4)
EOSINOPHIL # BLD AUTO: 0.04 K/UL (ref 0–0.51)
EOSINOPHIL NFR BLD: 0.2 % (ref 0–6.9)
ERYTHROCYTE [DISTWIDTH] IN BLOOD BY AUTOMATED COUNT: 45.2 FL (ref 35.9–50)
GLUCOSE BLD-MCNC: 101 MG/DL (ref 65–99)
GLUCOSE BLD-MCNC: 122 MG/DL (ref 65–99)
GLUCOSE BLD-MCNC: 136 MG/DL (ref 65–99)
GLUCOSE BLD-MCNC: 150 MG/DL (ref 65–99)
GLUCOSE SERPL-MCNC: 125 MG/DL (ref 65–99)
HCT VFR BLD AUTO: 37.5 % (ref 42–52)
HGB BLD-MCNC: 12 G/DL (ref 14–18)
IMM GRANULOCYTES # BLD AUTO: 0.18 K/UL (ref 0–0.11)
IMM GRANULOCYTES NFR BLD AUTO: 1 % (ref 0–0.9)
LYMPHOCYTES # BLD AUTO: 2.06 K/UL (ref 1–4.8)
LYMPHOCYTES NFR BLD: 11.7 % (ref 22–41)
MCH RBC QN AUTO: 29.5 PG (ref 27–33)
MCHC RBC AUTO-ENTMCNC: 32 G/DL (ref 33.7–35.3)
MCV RBC AUTO: 92.1 FL (ref 81.4–97.8)
MONOCYTES # BLD AUTO: 1.94 K/UL (ref 0–0.85)
MONOCYTES NFR BLD AUTO: 11 % (ref 0–13.4)
NEUTROPHILS # BLD AUTO: 13.35 K/UL (ref 1.82–7.42)
NEUTROPHILS NFR BLD: 75.8 % (ref 44–72)
NRBC # BLD AUTO: 0 K/UL
NRBC BLD-RTO: 0 /100 WBC
PLATELET # BLD AUTO: 275 K/UL (ref 164–446)
PMV BLD AUTO: 10.2 FL (ref 9–12.9)
POTASSIUM SERPL-SCNC: 3.9 MMOL/L (ref 3.6–5.5)
RBC # BLD AUTO: 4.07 M/UL (ref 4.7–6.1)
SODIUM SERPL-SCNC: 134 MMOL/L (ref 135–145)
WBC # BLD AUTO: 17.6 K/UL (ref 4.8–10.8)

## 2019-12-29 PROCEDURE — 96365 THER/PROPH/DIAG IV INF INIT: CPT

## 2019-12-29 PROCEDURE — 85025 COMPLETE CBC W/AUTO DIFF WBC: CPT

## 2019-12-29 PROCEDURE — 82962 GLUCOSE BLOOD TEST: CPT | Mod: 91

## 2019-12-29 PROCEDURE — 700105 HCHG RX REV CODE 258: Performed by: NURSE PRACTITIONER

## 2019-12-29 PROCEDURE — A9270 NON-COVERED ITEM OR SERVICE: HCPCS | Performed by: FAMILY MEDICINE

## 2019-12-29 PROCEDURE — 96372 THER/PROPH/DIAG INJ SC/IM: CPT

## 2019-12-29 PROCEDURE — 99225 PR SUBSEQUENT OBSERVATION CARE,LEVEL II: CPT | Performed by: INTERNAL MEDICINE

## 2019-12-29 PROCEDURE — 700111 HCHG RX REV CODE 636 W/ 250 OVERRIDE (IP): Performed by: FAMILY MEDICINE

## 2019-12-29 PROCEDURE — 700111 HCHG RX REV CODE 636 W/ 250 OVERRIDE (IP): Performed by: NURSE PRACTITIONER

## 2019-12-29 PROCEDURE — 700102 HCHG RX REV CODE 250 W/ 637 OVERRIDE(OP): Performed by: FAMILY MEDICINE

## 2019-12-29 PROCEDURE — G0378 HOSPITAL OBSERVATION PER HR: HCPCS

## 2019-12-29 PROCEDURE — 36415 COLL VENOUS BLD VENIPUNCTURE: CPT

## 2019-12-29 PROCEDURE — 80048 BASIC METABOLIC PNL TOTAL CA: CPT

## 2019-12-29 RX ADMIN — FENOFIBRATE 134 MG: 134 CAPSULE ORAL at 05:32

## 2019-12-29 RX ADMIN — BUSPIRONE HYDROCHLORIDE 10 MG: 10 TABLET ORAL at 17:20

## 2019-12-29 RX ADMIN — SODIUM CHLORIDE: 9 INJECTION, SOLUTION INTRAVENOUS at 04:41

## 2019-12-29 RX ADMIN — FAMOTIDINE 20 MG: 20 TABLET ORAL at 08:54

## 2019-12-29 RX ADMIN — LATANOPROST 1 DROP: 50 SOLUTION OPHTHALMIC at 20:07

## 2019-12-29 RX ADMIN — BUSPIRONE HYDROCHLORIDE 10 MG: 10 TABLET ORAL at 05:32

## 2019-12-29 RX ADMIN — MELATONIN 2000 UNITS: at 05:30

## 2019-12-29 RX ADMIN — CEFTRIAXONE SODIUM 2 G: 2 INJECTION, POWDER, FOR SOLUTION INTRAMUSCULAR; INTRAVENOUS at 09:42

## 2019-12-29 RX ADMIN — DIVALPROEX SODIUM 500 MG: 500 TABLET, DELAYED RELEASE ORAL at 05:31

## 2019-12-29 RX ADMIN — ATORVASTATIN CALCIUM 80 MG: 80 TABLET, FILM COATED ORAL at 17:16

## 2019-12-29 RX ADMIN — MONTELUKAST 10 MG: 10 TABLET, FILM COATED ORAL at 05:31

## 2019-12-29 RX ADMIN — SODIUM CHLORIDE: 9 INJECTION, SOLUTION INTRAVENOUS at 17:13

## 2019-12-29 RX ADMIN — BUSPIRONE HYDROCHLORIDE 10 MG: 10 TABLET ORAL at 12:44

## 2019-12-29 RX ADMIN — LEVOTHYROXINE SODIUM 200 MCG: 200 TABLET ORAL at 05:32

## 2019-12-29 RX ADMIN — ASPIRIN 81 MG: 81 TABLET, COATED ORAL at 05:30

## 2019-12-29 RX ADMIN — FAMOTIDINE 20 MG: 20 TABLET ORAL at 20:06

## 2019-12-29 RX ADMIN — ENOXAPARIN SODIUM 40 MG: 100 INJECTION SUBCUTANEOUS at 05:29

## 2019-12-29 RX ADMIN — SERTRALINE HYDROCHLORIDE 100 MG: 100 TABLET ORAL at 05:34

## 2019-12-29 RX ADMIN — SENNOSIDES AND DOCUSATE SODIUM 2 TABLET: 8.6; 5 TABLET ORAL at 17:16

## 2019-12-29 RX ADMIN — DIVALPROEX SODIUM 1500 MG: 500 TABLET, DELAYED RELEASE ORAL at 17:20

## 2019-12-29 RX ADMIN — INSULIN GLARGINE 20 UNITS: 100 INJECTION, SOLUTION SUBCUTANEOUS at 17:22

## 2019-12-29 ASSESSMENT — ENCOUNTER SYMPTOMS
DIARRHEA: 0
SPEECH CHANGE: 1
VOMITING: 0
FOCAL WEAKNESS: 1
COUGH: 0
FEVER: 0
DIZZINESS: 0
TINGLING: 0
HEADACHES: 0
SHORTNESS OF BREATH: 0
NAUSEA: 0
SORE THROAT: 0
ABDOMINAL PAIN: 0
SENSORY CHANGE: 1
MYALGIAS: 0
BLURRED VISION: 0

## 2019-12-29 NOTE — DISCHARGE PLANNING
Received Choice form at [phone]  Agency/Facility Name: [facility] [place] [place] [place] [place] [place], [place], [facility]  Referral sent per Choice form @ [phone]

## 2019-12-29 NOTE — DISCHARGE PLANNING
Agency/Facility Name: [facility], [place]  Spoke To: fax  Outcome: Pt. Declined. Medicaid    RN CM informed

## 2019-12-29 NOTE — PROGRESS NOTES
Neurology Progress Note  Neurohospitalist Service, [facility]    Referring Physician: ALDO Dias*    No chief complaint on file.      HPI: Refer to initial documented Neurology H&P, as detailed in the patient's chart.    Interval History 12/28/19:  Contd weakness on right    Review of systems: In addition to what is detailed in the HPI and/or updated in the interval history, all other systems reviewed and are negative.    Past Medical History:    has a past medical history of Anxiety, ASTHMA, Bipolar 1 disorder (HCC), Depression, Fall, Glaucoma, Glaucoma (1982), Hypothyroidism, Indigestion, Mental disorder, Murmur, Pneumonia, Psychiatric problem (2002), S/P thyroidectomy, Seizure (HCC) (2010), Seizure disorder (HCC), and Unspecified disorder of thyroid.    FHx:  family history includes Heart Disease in his mother; Hypertension in his mother; Lung Disease in his mother; Stroke in his maternal grandmother.    SHx:   reports that he quit smoking about 19 months ago. His smoking use included cigarettes and cigars. He smoked 0.25 packs per day. He has never used smokeless tobacco. He reports current drug use. Drug: Inhaled. He reports that he does not drink alcohol.    Medications:    Current Facility-Administered Medications:   •  acetaminophen (TYLENOL) tablet 650 mg, 650 mg, Oral, Q4HRS PRN, Bryn Huertas, A.P.R.N., 650 mg at 12/28/19 1814  •  NS infusion, , Intravenous, Continuous, Bryn Huertas, A.P.R.N., Stopped at 12/28/19 1900  •  atorvastatin (LIPITOR) tablet 80 mg, 80 mg, Oral, Q EVENING, Juventino Molina M.D., 80 mg at 12/28/19 1814  •  busPIRone (BUSPAR) tablet 10 mg, 10 mg, Oral, TID, Juventino Molina M.D., 10 mg at 12/28/19 1814  •  vitamin D (cholecalciferol) 1000 UNIT tablet 2,000 Units, 2,000 Units, Oral, DAILY, Juventino Molina M.D., 2,000 Units at 12/28/19 0612  •  divalproex (DEPAKOTE) delayed-release tablet 500-1,500 mg, 500-1,500 mg, Oral, BID,  Juventino Molina M.D., 1,500 mg at 12/28/19 1815  •  fenofibrate micronized (LOFIBRA) capsule 134 mg, 134 mg, Oral, QAM, Juventino Molina M.D., 134 mg at 12/28/19 0559  •  insulin glargine (LANTUS) injection 20 Units, 20 Units, Subcutaneous, Q EVENING, Juventino Molina M.D., 20 Units at 12/28/19 1817  •  latanoprost (XALATAN) 0.005 % ophthalmic solution 1 Drop, 1 Drop, Both Eyes, Nightly, Juventino Molina M.D., 1 Drop at 12/27/19 2359  •  levothyroxine (SYNTHROID) tablet 200 mcg, 200 mcg, Oral, AM ES, Juventino Molina M.D., 200 mcg at 12/28/19 0600  •  montelukast (SINGULAIR) tablet 10 mg, 10 mg, Oral, DAILY, Juventino Molina M.D., 10 mg at 12/28/19 0600  •  famotidine (PEPCID) tablet 20 mg, 20 mg, Oral, BID, Juventino Molina M.D., 20 mg at 12/28/19 1002  •  sertraline (ZOLOFT) tablet 100 mg, 100 mg, Oral, DAILY, Juventino Molina M.D., 100 mg at 12/28/19 0601  •  senna-docusate (PERICOLACE or SENOKOT S) 8.6-50 MG per tablet 2 Tab, 2 Tab, Oral, BID, 2 Tab at 12/28/19 1814 **AND** polyethylene glycol/lytes (MIRALAX) PACKET 1 Packet, 1 Packet, Oral, QDAY PRN **AND** magnesium hydroxide (MILK OF MAGNESIA) suspension 30 mL, 30 mL, Oral, QDAY PRN **AND** bisacodyl (DULCOLAX) suppository 10 mg, 10 mg, Rectal, QDAY PRN, Juventino Molina M.D.  •  [DISCONTINUED] insulin glargine (LANTUS) injection 27 Units, 0.2 Units/kg/day, Subcutaneous, Q EVENING **AND** [DISCONTINUED] insulin lispro (HUMALOG) injection 9 Units, 0.2 Units/kg/day, Subcutaneous, TID AC **AND** insulin lispro (HUMALOG) injection 1-6 Units, 1-6 Units, Subcutaneous, 4X/DAY ACHS, Stopped at 12/27/19 2100 **AND** Accu-Chek ACHS, , , Q AC AND BEDTIME(S) **AND** NOTIFY MD and PharmD, , , Once **AND** glucose 4 g chewable tablet 16 g, 16 g, Oral, Q15 MIN PRN **AND** DEXTROSE 10% BOLUS 250 mL, 250 mL, Intravenous, Q15 MIN PRN, Juventino Molina M.D.  •  aspirin EC (ECOTRIN) tablet 81 mg, 81 mg, Oral, DAILY, Juventino LUCERO  ROSA Molina, 81 mg at 12/28/19 0556  •  enoxaparin (LOVENOX) inj 40 mg, 40 mg, Subcutaneous, DAILY, Mariethan JAZMINE Molina M.D., 40 mg at 12/28/19 0556    Physical Examination:     Vitals:    12/28/19 0756 12/28/19 1248 12/28/19 1622 12/28/19 1944   BP: 111/62 118/65 119/56 (!) 93/51   Pulse: 77 80 77 74   Resp: 20 14 20 18   Temp: 36.3 °C (97.3 °F) 36.7 °C (98.1 °F) 36.3 °C (97.3 °F) 36.6 °C (97.8 °F)   TempSrc: Temporal Temporal Temporal Temporal   SpO2: 94% 92% 96% 96%   Weight:       Height:           General: Patient is awake and in no acute distress  Eyes: examination of optic disks not indicated at this time  CV: RRR    NEUROLOGICAL EXAM:     Mental status: Awake, alert and fully oriented, follows commands  Speech and language: speech is clear and fluent. The patient is able to name and repeat.  Cranial nerve exam: Pupils are equal, round and reactive to light bilaterally. Visual fields are full. Extraocular muscles are intact. Sensation in the face is intact to light touch. Face is symmetric. Hearing to finger rub equal. Palate elevates symmetrically. Shoulder shrug is full. Tongue is midline.  Motor exam: Strength is 0/5 right UE LE effort in all extremities both distally and proximally. Tone is normal. No abnormal movements were seen on exam.  Sensory exam: No sensory deficits identified   Deep tendon reflexes:  2+ and symmetric. Toes down-going bilaterally.  Coordination: no ataxia   Gait: deferred not tested    Objective Data:    Labs:  Lab Results   Component Value Date/Time    PROTHROMBTM 14.0 12/27/2019 05:30 PM    INR 1.06 12/27/2019 05:30 PM      Lab Results   Component Value Date/Time    WBC 22.6 (H) 12/28/2019 03:25 AM    RBC 4.23 (L) 12/28/2019 03:25 AM    HEMOGLOBIN 12.4 (L) 12/28/2019 03:25 AM    HEMATOCRIT 38.2 (L) 12/28/2019 03:25 AM    MCV 90.3 12/28/2019 03:25 AM    MCH 29.3 12/28/2019 03:25 AM    MCHC 32.5 (L) 12/28/2019 03:25 AM    MPV 9.8 12/28/2019 03:25 AM    NEUTSPOLYS 76.50 (H)  12/28/2019 03:25 AM    LYMPHOCYTES 10.40 (L) 12/28/2019 03:25 AM    MONOCYTES 11.30 12/28/2019 03:25 AM    EOSINOPHILS 0.90 12/28/2019 03:25 AM    BASOPHILS 0.90 12/28/2019 03:25 AM    ANISOCYTOSIS 1+ 12/28/2019 03:25 AM      Lab Results   Component Value Date/Time    SODIUM 136 12/28/2019 03:25 AM    POTASSIUM 3.9 12/28/2019 03:25 AM    CHLORIDE 101 12/28/2019 03:25 AM    CO2 23 12/28/2019 03:25 AM    GLUCOSE 133 (H) 12/28/2019 03:25 AM    BUN 20 12/28/2019 03:25 AM    CREATININE 1.20 12/28/2019 03:25 AM      Lab Results   Component Value Date/Time    CHOLSTRLTOT 171 07/24/2019 03:18 AM    LDL see below 07/24/2019 03:18 AM    HDL 29 (A) 07/24/2019 03:18 AM    TRIGLYCERIDE 597 (H) 07/24/2019 03:18 AM       Lab Results   Component Value Date/Time    ALKPHOSPHAT 53 12/28/2019 03:25 AM    ASTSGOT 9 (L) 12/28/2019 03:25 AM    ALTSGPT 8 12/28/2019 03:25 AM    TBILIRUBIN 0.6 12/28/2019 03:25 AM        Imaging/Testing:  Mri reviewed no evidence of acute stroke. Extensive global atrophy and leukoariosis      Assessment and Plan:    Norm Prabhakar is a 39 y.o. 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 Testing for MELAS and CADASIL among others rec. Contd weakness on right but no evidence of stroke on MRI. Possible some volitional issues.  No evidence of seizures.  May need SNF for recovery.  Plan:    The evaluation of the patient, and recommended management, was discussed with the resident staff. I have performed a physical exam and reviewed and updated ROS and Plan today (12/28/2019). In review of yesterday's note (12/27/2019), there are no changes except as documented above.    Jose Matias MD  Board Certified Neurology, ABPN  Board Certified Vascular Neurology, ABPN  t) [phone]

## 2019-12-29 NOTE — PROGRESS NOTES
[facility] Medicine Daily Progress Note    Date of Service  12/29/2019    Chief Complaint  39 y.o. male admitted 12/27/2019 with right-side weakness.     Hospital Course    This is a 38-year-old male with a past medical history of bipolar disorder, mental disorder, glaucoma, hypothyroidism, seizure disorder, diabetes, anxiety, asthma admitted to the ED with complaints of new onset right-sided weakness.  He was evaluated by neurology in the ED and determined not to be a TPA candidate.  CT head was unremarkable.  CTA head and neck were within normal limits.  MRI brain was negative for stroke but did reveal findings consistent with genetic leukodystrophy.  Pending work-up for MELAS and CADASIL       Interval Problem Update  12/28: Continues to complain of right upper and lower extremity paralysis.  Continuing with physical and Occupational Therapy.  He is noted to have positive reflexes to the right side as well as muscle contraction when working with therapy despite his reports of being unable to move the right side.  These findings are more consistent with MELAS.  He will likely need aggressive therapies.  Denies acute pain, shortness of breath, nausea, or vomiting.  12/29:  Noted to have UTI.  Started on antibx.  Afebrile.  VSS.  He continues to have an inconsistent exam when testing mobility of his right upper and lower extremities.  Continue aggressive PT/OT.    Consultants/Specialty  Neurology     Code Status  FULL    Disposition  SNF referral placed.  Transfer to neurology.    Review of Systems  Review of Systems   Constitutional: Positive for malaise/fatigue. Negative for fever.   HENT: Negative for congestion and sore throat.    Eyes: Negative for blurred vision.   Respiratory: Negative for cough and shortness of breath.    Cardiovascular: Negative for chest pain and leg swelling.   Gastrointestinal: Negative for abdominal pain, diarrhea, nausea and vomiting.   Genitourinary: Negative for dysuria and urgency.    Musculoskeletal: Negative for joint pain and myalgias.   Skin: Negative for rash.   Neurological: Positive for sensory change, speech change and focal weakness. Negative for dizziness, tingling and headaches.        Physical Exam  Temp:  [36.3 °C (97.3 °F)-37.5 °C (99.5 °F)] 36.4 °C (97.5 °F)  Pulse:  [70-88] 70  Resp:  [14-20] 17  BP: ()/(51-75) 118/56  SpO2:  [92 %-97 %] 94 %    Physical Exam  Vitals signs and nursing note reviewed.   Constitutional:       General: He is not in acute distress.     Appearance: Normal appearance. He is not ill-appearing or toxic-appearing.   HENT:      Head: Normocephalic and atraumatic.      Nose: Nose normal. No congestion or rhinorrhea.      Mouth/Throat:      Mouth: Mucous membranes are moist.      Pharynx: Oropharynx is clear.   Eyes:      General: No scleral icterus.     Conjunctiva/sclera: Conjunctivae normal.      Pupils: Pupils are equal, round, and reactive to light.   Neck:      Musculoskeletal: Normal range of motion. No neck rigidity or muscular tenderness.   Cardiovascular:      Rate and Rhythm: Normal rate.      Pulses: Normal pulses.      Heart sounds: Normal heart sounds. No murmur. No friction rub.   Pulmonary:      Effort: Pulmonary effort is normal. No respiratory distress.      Breath sounds: Normal breath sounds. No wheezing or rales.   Abdominal:      General: Abdomen is flat. Bowel sounds are normal. There is no distension.      Tenderness: There is no tenderness. There is no guarding.   Musculoskeletal:         General: No deformity.      Right lower leg: No edema.      Left lower leg: No edema.   Skin:     General: Skin is warm and dry.      Capillary Refill: Capillary refill takes less than 2 seconds.      Coloration: Skin is not jaundiced or pale.   Neurological:      Mental Status: He is alert and oriented to person, place, and time.      Sensory: Sensory deficit present.      Motor: Weakness present.      Coordination: Coordination abnormal.       Gait: Gait abnormal.      Comments: RUE/RLE ROM 1/5  LUE/LLE ROM 5/5  Slowed speech   Psychiatric:      Comments: Underlying mental disorder.  Flat affect.           Fluids  No intake or output data in the 24 hours ending 12/29/19 0828    Laboratory  Recent Labs     12/27/19 1730 12/28/19 0325 12/29/19  0027   WBC 22.6* 22.6* 17.6*   RBC 4.74 4.23* 4.07*   HEMOGLOBIN 13.7* 12.4* 12.0*   HEMATOCRIT 42.9 38.2* 37.5*   MCV 90.5 90.3 92.1   MCH 28.9 29.3 29.5   MCHC 31.9* 32.5* 32.0*   RDW 44.1 43.9 45.2   PLATELETCT 331 287 275   MPV 10.1 9.8 10.2     Recent Labs     12/27/19  1730 12/28/19 0325 12/29/19  0027   SODIUM 135 136 134*   POTASSIUM 4.2 3.9 3.9   CHLORIDE 100 101 102   CO2 23 23 23   GLUCOSE 130* 133* 125*   BUN 20 20 25*   CREATININE 1.14 1.20 1.16   CALCIUM 9.9 9.4 8.3*     Recent Labs     12/27/19 1730   APTT 30.5   INR 1.06               Imaging  MR-BRAIN-W/O   Final Result      1.  Moderate diffuse cerebral atrophy.      2.  Stable extensive confluent abnormal increased T2 signal intensity throughout the periventricular and juxtacortical white matter consistent with extensive chronic demyelination or dysmyelination. Additionally a severe toxic metabolic insult could    cause this appearance.      3.  Unchanged elliptical cystic fluid signal intensity collection abutting the left globe superior laterally.      DX-CHEST-PORTABLE (1 VIEW)   Final Result      No acute cardiopulmonary findings.      CT-CTA NECK WITH & W/O-POST PROCESSING   Final Result      1.  No significant stenosis or dissection is identified.      CT-CTA HEAD WITH & W/O-POST PROCESS   Final Result      No large vessel occlusion or aneurysm is identified.      CT-CEREBRAL PERFUSION ANALYSIS   Final Result      1.  Cerebral blood flow less than 30% likely representing completed infarct = 0 mL.      2.  T Max more than 6 seconds likely representing combination of completed infarct and ischemia = 0 mL. 5 mL reported is likely  artifactual      3.  Mismatched volume likely representing ischemic brain/penumbra = no significant      4.  Please note that the cerebral perfusion was performed on the limited brain tissue around the basal ganglia region. Infarct/ischemia outside the CT perfusion sections can be missed in this study.      CT-HEAD W/O   Final Result   Addendum 1 of 1   Addendum:      Confluent white matter changes are similar to multiple prior exams.      Final           Assessment/Plan  Right hemiparesis (HCC)- (present on admission)  Assessment & Plan  CTA head and neck wnl  MRI negative for stroke, but findings consistent with genetic leukodystrophy  Neurology following  High suspicion for MELAS  Blood work pending  Continue PT/OT  SNF referral placed.     Leukocytosis  Assessment & Plan  Secondary to UTI  Start ceftriaxone  Follow cbc.     UTI (urinary tract infection)  Assessment & Plan  Significantly abnormal UA  Urine cx pending  Start ceftriaxone      CVA (cerebral vascular accident) (HCC)  Assessment & Plan  MRI brain negative for acute stroke  Right hemiparesis likely secondary to MELAS  Plan as above  CVA r/o    Type 2 diabetes mellitus without complication, without long-term current use of insulin (HCC)- (present on admission)  Assessment & Plan  Continue lantus and SSI  Diabetic diet  Accuchecks    Seizure (HCC)- (present on admission)  Assessment & Plan  H/O  Continue depakote    Acquired hypothyroidism- (present on admission)  Assessment & Plan  levothyroxine       VTE prophylaxis: Lovenox.

## 2019-12-29 NOTE — CARE PLAN
Problem: Medication  Goal: Compliance with prescribed medication will improve  Outcome: MET     Problem: Knowledge Deficit  Goal: Knowledge of disease process/condition, treatment plan, diagnostic tests, and medications will improve  Outcome: MET     Problem: Mobility  Goal: Risk for activity intolerance will decrease  Outcome: MET

## 2019-12-29 NOTE — DISCHARGE PLANNING
Agency/Facility Name: [facility]  Spoke To: Cliff  Outcome: Referral is still pending in RN review.      RN CM informed

## 2019-12-29 NOTE — CARE PLAN
Problem: Discharge Barriers/Planning  Goal: Patient's continuum of care needs will be met  Outcome: PROGRESSING AS EXPECTED     Problem: Respiratory:  Goal: Respiratory status will improve  Outcome: PROGRESSING AS EXPECTED  Note:   Maintain O2 saturation.     Problem: Medication  Goal: Compliance with prescribed medication will improve  Outcome: PROGRESSING AS EXPECTED     Problem: Knowledge Deficit  Goal: Knowledge of disease process/condition, treatment plan, diagnostic tests, and medications will improve  Outcome: PROGRESSING AS EXPECTED     Problem: Communication  Goal: The ability to communicate needs accurately and effectively will improve  Outcome: PROGRESSING AS EXPECTED  Note:   Discussed plan of care, answered all question and concerned regarding care.     Problem: Safety  Goal: Will remain free from injury  Outcome: PROGRESSING AS EXPECTED  Note:   Fall precaution in place. Bed alarm on. Call light within reach.  Goal: Will remain free from falls  Outcome: PROGRESSING AS EXPECTED     Problem: Pain Management  Goal: Pain level will decrease to patient's comfort goal  Outcome: PROGRESSING AS EXPECTED     Problem: Bowel/Gastric:  Goal: Normal bowel function is maintained or improved  Outcome: PROGRESSING AS EXPECTED  Goal: Will not experience complications related to bowel motility  Outcome: PROGRESSING AS EXPECTED     Problem: Skin Integrity  Goal: Risk for impaired skin integrity will decrease  Outcome: PROGRESSING AS EXPECTED  Note:   Encourage pt to turn self every 2 hours.     Problem: Mobility  Goal: Risk for activity intolerance will decrease  Outcome: PROGRESSING AS EXPECTED

## 2019-12-30 ENCOUNTER — APPOINTMENT (OUTPATIENT)
Dept: RADIOLOGY | Facility: MEDICAL CENTER | Age: 39
End: 2019-12-30
Attending: NURSE PRACTITIONER
Payer: MEDICAID

## 2019-12-30 LAB
AMMONIA PLAS-SCNC: 32 UMOL/L (ref 11–45)
ANION GAP SERPL CALC-SCNC: 10 MMOL/L (ref 0–11.9)
BASOPHILS # BLD AUTO: 0.6 % (ref 0–1.8)
BASOPHILS # BLD: 0.05 K/UL (ref 0–0.12)
BUN SERPL-MCNC: 22 MG/DL (ref 8–22)
CALCIUM SERPL-MCNC: 8.9 MG/DL (ref 8.5–10.5)
CHLORIDE SERPL-SCNC: 101 MMOL/L (ref 96–112)
CHOLEST SERPL-MCNC: 126 MG/DL (ref 100–199)
CO2 SERPL-SCNC: 24 MMOL/L (ref 20–33)
CREAT SERPL-MCNC: 0.96 MG/DL (ref 0.5–1.4)
EOSINOPHIL # BLD AUTO: 0.1 K/UL (ref 0–0.51)
EOSINOPHIL NFR BLD: 1.2 % (ref 0–6.9)
ERYTHROCYTE [DISTWIDTH] IN BLOOD BY AUTOMATED COUNT: 44.6 FL (ref 35.9–50)
GLUCOSE BLD-MCNC: 119 MG/DL (ref 65–99)
GLUCOSE BLD-MCNC: 145 MG/DL (ref 65–99)
GLUCOSE BLD-MCNC: 162 MG/DL (ref 65–99)
GLUCOSE BLD-MCNC: 172 MG/DL (ref 65–99)
GLUCOSE SERPL-MCNC: 146 MG/DL (ref 65–99)
HCT VFR BLD AUTO: 35.4 % (ref 42–52)
HDLC SERPL-MCNC: 5 MG/DL
HGB BLD-MCNC: 11.6 G/DL (ref 14–18)
IMM GRANULOCYTES # BLD AUTO: 0.14 K/UL (ref 0–0.11)
IMM GRANULOCYTES NFR BLD AUTO: 1.7 % (ref 0–0.9)
LDLC SERPL CALC-MCNC: ABNORMAL MG/DL
LYMPHOCYTES # BLD AUTO: 2.22 K/UL (ref 1–4.8)
LYMPHOCYTES NFR BLD: 27.7 % (ref 22–41)
MCH RBC QN AUTO: 29.8 PG (ref 27–33)
MCHC RBC AUTO-ENTMCNC: 32.8 G/DL (ref 33.7–35.3)
MCV RBC AUTO: 91 FL (ref 81.4–97.8)
MONOCYTES # BLD AUTO: 1.23 K/UL (ref 0–0.85)
MONOCYTES NFR BLD AUTO: 15.4 % (ref 0–13.4)
NEUTROPHILS # BLD AUTO: 4.27 K/UL (ref 1.82–7.42)
NEUTROPHILS NFR BLD: 53.4 % (ref 44–72)
NRBC # BLD AUTO: 0 K/UL
NRBC BLD-RTO: 0 /100 WBC
PLATELET # BLD AUTO: 271 K/UL (ref 164–446)
PMV BLD AUTO: 9.7 FL (ref 9–12.9)
POTASSIUM SERPL-SCNC: 4 MMOL/L (ref 3.6–5.5)
RBC # BLD AUTO: 3.89 M/UL (ref 4.7–6.1)
SODIUM SERPL-SCNC: 135 MMOL/L (ref 135–145)
TRIGL SERPL-MCNC: 529 MG/DL (ref 0–149)
TSH SERPL DL<=0.005 MIU/L-ACNC: 9.15 UIU/ML (ref 0.38–5.33)
VALPROATE SERPL-MCNC: 68 UG/ML (ref 50–100)
WBC # BLD AUTO: 8 K/UL (ref 4.8–10.8)

## 2019-12-30 PROCEDURE — G0378 HOSPITAL OBSERVATION PER HR: HCPCS

## 2019-12-30 PROCEDURE — 97530 THERAPEUTIC ACTIVITIES: CPT

## 2019-12-30 PROCEDURE — 70551 MRI BRAIN STEM W/O DYE: CPT

## 2019-12-30 PROCEDURE — 96372 THER/PROPH/DIAG INJ SC/IM: CPT

## 2019-12-30 PROCEDURE — A9270 NON-COVERED ITEM OR SERVICE: HCPCS | Performed by: NURSE PRACTITIONER

## 2019-12-30 PROCEDURE — 96366 THER/PROPH/DIAG IV INF ADDON: CPT

## 2019-12-30 PROCEDURE — 95951 EEG: CPT | Mod: 52 | Performed by: PSYCHIATRY & NEUROLOGY

## 2019-12-30 PROCEDURE — A9270 NON-COVERED ITEM OR SERVICE: HCPCS | Performed by: FAMILY MEDICINE

## 2019-12-30 PROCEDURE — 700105 HCHG RX REV CODE 258: Performed by: NURSE PRACTITIONER

## 2019-12-30 PROCEDURE — 95951 EEG: CPT | Mod: 26,52 | Performed by: PSYCHIATRY & NEUROLOGY

## 2019-12-30 PROCEDURE — 99214 OFFICE O/P EST MOD 30 MIN: CPT | Performed by: NURSE PRACTITIONER

## 2019-12-30 PROCEDURE — 80307 DRUG TEST PRSMV CHEM ANLYZR: CPT

## 2019-12-30 PROCEDURE — 80164 ASSAY DIPROPYLACETIC ACD TOT: CPT

## 2019-12-30 PROCEDURE — 700102 HCHG RX REV CODE 250 W/ 637 OVERRIDE(OP): Performed by: FAMILY MEDICINE

## 2019-12-30 PROCEDURE — 700102 HCHG RX REV CODE 250 W/ 637 OVERRIDE(OP): Performed by: NURSE PRACTITIONER

## 2019-12-30 PROCEDURE — 700111 HCHG RX REV CODE 636 W/ 250 OVERRIDE (IP): Performed by: NURSE PRACTITIONER

## 2019-12-30 PROCEDURE — 82962 GLUCOSE BLOOD TEST: CPT | Mod: 91

## 2019-12-30 PROCEDURE — 85025 COMPLETE CBC W/AUTO DIFF WBC: CPT

## 2019-12-30 PROCEDURE — 82140 ASSAY OF AMMONIA: CPT

## 2019-12-30 PROCEDURE — 80061 LIPID PANEL: CPT

## 2019-12-30 PROCEDURE — 84443 ASSAY THYROID STIM HORMONE: CPT

## 2019-12-30 PROCEDURE — 99225 PR SUBSEQUENT OBSERVATION CARE,LEVEL II: CPT | Performed by: INTERNAL MEDICINE

## 2019-12-30 PROCEDURE — 700111 HCHG RX REV CODE 636 W/ 250 OVERRIDE (IP): Performed by: FAMILY MEDICINE

## 2019-12-30 PROCEDURE — 80048 BASIC METABOLIC PNL TOTAL CA: CPT

## 2019-12-30 RX ADMIN — LATANOPROST 1 DROP: 50 SOLUTION OPHTHALMIC at 19:48

## 2019-12-30 RX ADMIN — ATORVASTATIN CALCIUM 80 MG: 80 TABLET, FILM COATED ORAL at 18:05

## 2019-12-30 RX ADMIN — MELATONIN 2000 UNITS: at 06:35

## 2019-12-30 RX ADMIN — DIVALPROEX SODIUM 1500 MG: 500 TABLET, DELAYED RELEASE ORAL at 18:05

## 2019-12-30 RX ADMIN — ACETAMINOPHEN 650 MG: 325 TABLET, FILM COATED ORAL at 11:48

## 2019-12-30 RX ADMIN — FAMOTIDINE 20 MG: 20 TABLET ORAL at 09:49

## 2019-12-30 RX ADMIN — BUSPIRONE HYDROCHLORIDE 10 MG: 10 TABLET ORAL at 18:05

## 2019-12-30 RX ADMIN — SERTRALINE HYDROCHLORIDE 100 MG: 100 TABLET ORAL at 06:38

## 2019-12-30 RX ADMIN — FENOFIBRATE 134 MG: 134 CAPSULE ORAL at 06:37

## 2019-12-30 RX ADMIN — BUSPIRONE HYDROCHLORIDE 10 MG: 10 TABLET ORAL at 13:58

## 2019-12-30 RX ADMIN — CEFTRIAXONE SODIUM 2 G: 2 INJECTION, POWDER, FOR SOLUTION INTRAMUSCULAR; INTRAVENOUS at 06:35

## 2019-12-30 RX ADMIN — DIVALPROEX SODIUM 500 MG: 500 TABLET, DELAYED RELEASE ORAL at 06:37

## 2019-12-30 RX ADMIN — ENOXAPARIN SODIUM 40 MG: 100 INJECTION SUBCUTANEOUS at 06:36

## 2019-12-30 RX ADMIN — LEVOTHYROXINE SODIUM 200 MCG: 200 TABLET ORAL at 06:38

## 2019-12-30 RX ADMIN — MONTELUKAST 10 MG: 10 TABLET, FILM COATED ORAL at 06:36

## 2019-12-30 RX ADMIN — ASPIRIN 81 MG: 81 TABLET, COATED ORAL at 06:37

## 2019-12-30 RX ADMIN — BUSPIRONE HYDROCHLORIDE 10 MG: 10 TABLET ORAL at 06:38

## 2019-12-30 RX ADMIN — SODIUM CHLORIDE: 9 INJECTION, SOLUTION INTRAVENOUS at 09:49

## 2019-12-30 RX ADMIN — INSULIN GLARGINE 20 UNITS: 100 INJECTION, SOLUTION SUBCUTANEOUS at 18:05

## 2019-12-30 RX ADMIN — FAMOTIDINE 20 MG: 20 TABLET ORAL at 19:48

## 2019-12-30 ASSESSMENT — ENCOUNTER SYMPTOMS
FEVER: 0
PALPITATIONS: 0
SHORTNESS OF BREATH: 0
NAUSEA: 0
TREMORS: 0
CONSTIPATION: 0
CHILLS: 0
DIARRHEA: 0
SPEECH CHANGE: 1
FOCAL WEAKNESS: 1
BLURRED VISION: 0
TINGLING: 0
DOUBLE VISION: 0
ABDOMINAL PAIN: 0
VOMITING: 0
HEADACHES: 0
WEAKNESS: 1
DIZZINESS: 0

## 2019-12-30 ASSESSMENT — COGNITIVE AND FUNCTIONAL STATUS - GENERAL
TURNING FROM BACK TO SIDE WHILE IN FLAT BAD: A LITTLE
STANDING UP FROM CHAIR USING ARMS: A LITTLE
MOVING FROM LYING ON BACK TO SITTING ON SIDE OF FLAT BED: A LITTLE
CLIMB 3 TO 5 STEPS WITH RAILING: TOTAL
MOBILITY SCORE: 13
MOVING TO AND FROM BED TO CHAIR: UNABLE
WALKING IN HOSPITAL ROOM: A LOT
SUGGESTED CMS G CODE MODIFIER MOBILITY: CL

## 2019-12-30 ASSESSMENT — GAIT ASSESSMENTS: GAIT LEVEL OF ASSIST: REFUSED

## 2019-12-30 NOTE — PROGRESS NOTES
[facility] Medicine Daily Progress Note    Date of Service  12/30/2019    Chief Complaint  39 y.o. male admitted 12/27/2019 with right-side weakness.     Hospital Course    This is a 38-year-old male with a past medical history of bipolar disorder, mental disorder, glaucoma, hypothyroidism, seizure disorder, diabetes, anxiety, asthma admitted to the ED with complaints of new onset right-sided weakness.  He was evaluated by neurology in the ED and determined not to be a TPA candidate.  CT head was unremarkable.  CTA head and neck were within normal limits.  MRI brain was negative for stroke but did reveal findings consistent with genetic leukodystrophy.  Pending work-up for MELAS and CADASIL.       Interval Problem Update  12/28: Continues to complain of right upper and lower extremity paralysis.  Continuing with physical and Occupational Therapy.  He is noted to have positive reflexes to the right side as well as muscle contraction when working with therapy despite his reports of being unable to move the right side.  These findings are more consistent with MELAS.  He will likely need aggressive therapies.  Denies acute pain, shortness of breath, nausea, or vomiting.  12/29:  Noted to have UTI.  Started on antibx.  Afebrile.  VSS.  He continues to have an inconsistent exam when testing mobility of his right upper and lower extremities.  Continue aggressive PT/OT.  12/30  EEG negative.  Repeat MRI brain unchanged.  Noted to be moving right arm and leg by staff.  Continues to report right-sided numbness and paralysis, although reflexes are intact.  Neuro exam not consistent with paralysis.  Suspect psychiatric aspect.  Denies pain or shortness of breath.    Consultants/Specialty  Neurology     Code Status  FULL    Disposition  Accepted to SNF.  Awaiting placement.    Review of Systems  Review of Systems   Constitutional: Positive for malaise/fatigue. Negative for chills and fever.   HENT: Negative for congestion.     Eyes: Negative for blurred vision and double vision.   Respiratory: Negative for shortness of breath.    Cardiovascular: Negative for chest pain, palpitations and leg swelling.   Gastrointestinal: Negative for abdominal pain, constipation, diarrhea, nausea and vomiting.   Genitourinary: Negative for dysuria.   Musculoskeletal: Negative for joint pain.   Skin: Negative for itching and rash.   Neurological: Positive for speech change, focal weakness and weakness. Negative for dizziness, tingling, tremors and headaches.        Physical Exam  Temp:  [35.9 °C (96.6 °F)-36.8 °C (98.3 °F)] 35.9 °C (96.7 °F)  Pulse:  [57-66] 58  Resp:  [14-20] 16  BP: (106-112)/(58-67) 109/61  SpO2:  [94 %-98 %] 94 %    Physical Exam  Vitals signs and nursing note reviewed.   Constitutional:       General: He is not in acute distress.     Appearance: Normal appearance. He is obese. He is not ill-appearing.   HENT:      Head: Normocephalic and atraumatic.      Right Ear: External ear normal.      Left Ear: External ear normal.      Nose: Nose normal. No congestion.      Mouth/Throat:      Mouth: Mucous membranes are moist.      Pharynx: Oropharynx is clear. No oropharyngeal exudate.   Eyes:      General:         Right eye: No discharge.         Left eye: No discharge.      Conjunctiva/sclera: Conjunctivae normal.      Pupils: Pupils are equal, round, and reactive to light.   Neck:      Musculoskeletal: Normal range of motion. No neck rigidity.   Cardiovascular:      Rate and Rhythm: Normal rate.      Pulses: Normal pulses.      Heart sounds: Normal heart sounds. No murmur.   Pulmonary:      Effort: Pulmonary effort is normal. No respiratory distress.      Breath sounds: Normal breath sounds. No wheezing.   Abdominal:      General: Abdomen is flat. Bowel sounds are normal. There is no distension.      Tenderness: There is no tenderness.   Musculoskeletal:         General: No swelling or deformity.      Right lower leg: No edema.      Left  lower leg: No edema.   Skin:     General: Skin is warm and dry.      Capillary Refill: Capillary refill takes less than 2 seconds.      Coloration: Skin is not jaundiced.   Neurological:      Mental Status: He is alert and oriented to person, place, and time.      Sensory: Sensory deficit present.      Motor: Weakness present.      Coordination: Coordination abnormal.      Gait: Gait abnormal.      Comments: RUE/RLE ROM 1/5  LUE/LLE ROM 5/5  Slowed speech   Psychiatric:      Comments: Underlying mental disorder.  Flat affect.           Fluids    Intake/Output Summary (Last 24 hours) at 12/30/2019 1424  Last data filed at 12/30/2019 0403  Gross per 24 hour   Intake 275 ml   Output 1250 ml   Net -975 ml       Laboratory  Recent Labs     12/28/19  0325 12/29/19  0027 12/30/19  0705   WBC 22.6* 17.6* 8.0   RBC 4.23* 4.07* 3.89*   HEMOGLOBIN 12.4* 12.0* 11.6*   HEMATOCRIT 38.2* 37.5* 35.4*   MCV 90.3 92.1 91.0   MCH 29.3 29.5 29.8   MCHC 32.5* 32.0* 32.8*   RDW 43.9 45.2 44.6   PLATELETCT 287 275 271   MPV 9.8 10.2 9.7     Recent Labs     12/28/19  0325 12/29/19  0027 12/30/19  0705   SODIUM 136 134* 135   POTASSIUM 3.9 3.9 4.0   CHLORIDE 101 102 101   CO2 23 23 24   GLUCOSE 133* 125* 146*   BUN 20 25* 22   CREATININE 1.20 1.16 0.96   CALCIUM 9.4 8.3* 8.9     Recent Labs     12/27/19  1730   APTT 30.5   INR 1.06         Recent Labs     12/30/19  0925   TRIGLYCERIDE 529*   HDL 5*   LDL see below       Imaging  MR-BRAIN-W/O   Final Result      1.  Moderate diffuse cerebral atrophy.      2.  Stable extensive confluent abnormal increased T2 signal intensity throughout the periventricular and juxtacortical white matter consistent with extensive chronic demyelination or dysmyelination. Additionally severe toxic developed Dmitri insult could    cause this appearance.      3.  Unchanged small elliptical fluid signal intensity collection adjacent to the superior lateral aspect the left globe.      MR-BRAIN-W/O   Final Result       1.  Moderate diffuse cerebral atrophy.      2.  Stable extensive confluent abnormal increased T2 signal intensity throughout the periventricular and juxtacortical white matter consistent with extensive chronic demyelination or dysmyelination. Additionally a severe toxic metabolic insult could    cause this appearance.      3.  Unchanged elliptical cystic fluid signal intensity collection abutting the left globe superior laterally.      DX-CHEST-PORTABLE (1 VIEW)   Final Result      No acute cardiopulmonary findings.      CT-CTA NECK WITH & W/O-POST PROCESSING   Final Result      1.  No significant stenosis or dissection is identified.      CT-CTA HEAD WITH & W/O-POST PROCESS   Final Result      No large vessel occlusion or aneurysm is identified.      CT-CEREBRAL PERFUSION ANALYSIS   Final Result      1.  Cerebral blood flow less than 30% likely representing completed infarct = 0 mL.      2.  T Max more than 6 seconds likely representing combination of completed infarct and ischemia = 0 mL. 5 mL reported is likely artifactual      3.  Mismatched volume likely representing ischemic brain/penumbra = no significant      4.  Please note that the cerebral perfusion was performed on the limited brain tissue around the basal ganglia region. Infarct/ischemia outside the CT perfusion sections can be missed in this study.      CT-HEAD W/O   Final Result   Addendum 1 of 1   Addendum:      Confluent white matter changes are similar to multiple prior exams.      Final           Assessment/Plan  Right hemiparesis (HCC)- (present on admission)  Assessment & Plan  CTA head and neck wnl  MRI negative for stroke, but findings consistent with genetic leukodystrophy  EEG negative  Neurology following  Work-up for MELAS vs CADASIL pending  Inconsistent neurological exam.  May have psychiatric component  Continue PT/OT  Awaiting SNF placement.    Leukocytosis  Assessment & Plan  Secondary to UTI  Continue ceftriaxone  Follow cbc.      UTI (urinary tract infection)  Assessment & Plan  Significantly abnormal UA  Urine cx growing [id number]  Continue ceftriaxone      CVA (cerebral vascular accident) ([facility])  Assessment & Plan  MRI brain negative for acute stroke  Right hemiparesis likely secondary to MELAS vs CADASIL   Plan as above  CVA r/o    HLD (hyperlipidemia)- (present on admission)  Assessment & Plan  Continue lofibra and lipitor.     Type 2 diabetes mellitus without complication, without long-term current use of insulin ([facility])- (present on admission)  Assessment & Plan  Continue lantus and SSI  Diabetic diet  Accuchecks    Seizure ([facility])- (present on admission)  Assessment & Plan  H/O  Continue depakote    Acquired hypothyroidism- (present on admission)  Assessment & Plan  levothyroxine       VTE prophylaxis: Lovenox.

## 2019-12-30 NOTE — DISCHARGE PLANNING
Patient is eligible for Medicaid Meds to Beds at discharge if they have coverage with [place] Medicaid, Medicaid FFS, Medicaid HMO ([facility]), or [place]. This service is provided through the [facility] if orders are received by the pharmacy prior to 4pm Monday through Friday excluding holidays. Preferred pharmacy has been changed to [facility]. Please call [phone] prior to discharge.

## 2019-12-30 NOTE — PROCEDURES
ROUTINE ELECTROENCEPHALOGRAM REPORT      Referring provider: YANELY Wilkins    DOS: 12/30/2019 (total recording of 25 minutes)    INDICATION:  Norm Prabhakar 39 y.o. male presenting with history of seizures, right-sided weakness.    CURRENT ANTIEPILEPTIC REGIMEN: None    TECHNIQUE: 30 channel routine electroencephalogram (EEG) was performed in accordance with the international 10-20 system. The study was reviewed in bipolar and referential montages. The recording examined the patient during wakeful and drowsy/sleep state(s).     DESCRIPTION OF THE RECORD:  The EEG during wakefulness shows a symmetric 8 Hz activity over the posterior head regions.  No epileptiform discharges were seen.  Drowsiness was observed but no organized sleep architecture was seen.    ACTIVATION PROCEDURES:   Photic stimulation did not produce a driving response.  Hyperventilation was not performed.      ICTAL AND/OR INTERICTAL FINDINGS:   No focal or generalized epileptiform activity noted. No regional slowing was seen during this routine study.  No clinical events or seizures were reported or recorded during the study.     EKG: sampling of the EKG recording demonstrated sinus rhythm.       INTERPRETATION:  This is a normal awake through drowsy EEG.  No epileptiform discharges or subclinical seizures were seen.    Note: A normal EEG does not rule out epilepsy.  If the clinical suspicion remains high for seizures, a prolonged recording to capture clinical or subclinical events may be helpful.    Luis Soto M.D., Diplomat of the American Board of Psychiatry and Neurology  Diplomat of [facility]   Assistant Clinical Professor, [facility] Neurology Consultant

## 2019-12-30 NOTE — PROGRESS NOTES
Pt a&o 4. Has stuttering and slight delay in response. On RA. Respirations equal and unlabored. Denies pain.  Right UE and RLE drift noted. Left eye blindness. Reports numbness and tingling to RLE. NIH 14, hx of right hemiparesis in the past, blindness and numbness and tingling at baseline. Repositions self in bed.  Good po intake without any s/sx of aspiration noted. TOLBERT.  Plan of care reviewed including: fall precautions, neuro checks, transfer order, IV antibiotics and labs. Verbalized understanding and agrees. White board updated. Instructed to use call light prior to getting oob to prevent falls. Able to make needs known.  Call light within reach.

## 2019-12-30 NOTE — DISCHARGE PLANNING
Agency/Facility Name: [facility]  Spoke To: Shannon  Outcome: Patient accepted, however, no transport available for today as [facility] transport and [facility] are booked. Shannon at [facility] will verify transport time availability for tomorrow and call back.

## 2019-12-30 NOTE — THERAPY
"Physical Therapy Treatment completed.   Bed Mobility:  Supine to Sit: Supervised  Transfers: Sit to Stand: Minimal Assist  Gait: Level Of Assist: Refused with Front-Wheel Walker       Plan of Care: Will benefit from Physical Therapy 2 times per week  Discharge Recommendations: Equipment: Will Continue to Assess for Equipment Needs. Post-acute therapy Discharge to home with outpatient or home health for additional skilled therapy services.    Pt seen today for PT treatment session to assess for improvement with mobility over the weekend. Pt lying on L side upon arrival and agreeable to session. Pt performed supine to sit with HOB elevated and use of rail, primarily with L UE. Despite pt reporting inability to \"move or use\" R UE, pt able to push with R triceps to assist with transition from supine to sit. Pt also able to manage R LE off EOB with visual active use of R hip flexors and quads to  bring R LE to EOB. Pt also had good control of R LE when bringing R LE off EOB. Attempted MMT at EOB. Pt with poor effort when using R UE/LE. Pt able to functionally use R side for mobility tasks and R hand on bed for balance/stability. Pt completed sit to stand with FWW, minimal assist. Pt reports \"pain in left ankle because I sprained it recently\", which is currently limiting his ability to  addition to R sided weakness. Pt returned to bed with minimal assist to manage R LE. Pt with inconsistent effort throughout today's session. Through pt not giving efforts during MMT, pt witnessed actively using R UE and LE throughout session with good control. Pt would benefit from ongoing PT intervention while in the acute care setting given current deficits. Pt should be able to DC home to group home with appropriate equipment     See \"Rehab Therapy-Acute\" Patient Summary Report for complete documentation.       "

## 2019-12-30 NOTE — DISCHARGE PLANNING
Received call from Mary HAGER that [facility] has accepted patient and will arrange transport tomorrow 12/31.  Bedside RN aware.

## 2019-12-30 NOTE — PROGRESS NOTES
No chief complaint on file.      Problem List Items Addressed This Visit     Right hemiparesis (HCC)     CTA head and neck wnl  MRI negative for stroke, but findings consistent with genetic leukodystrophy  Neurology following  Work-up for MELAS vs CADASIL pending  Repeat MRI brain pending  Inconsistent neurological exam.  May have psychiatric component  Continue PT/OT  Awaiting SNF placement.         Relevant Orders    REFERRAL TO SKILLED NURSING FACILITY      Other Visit Diagnoses     Right sided weakness          Neurology Progress Note    Brief History of present illness:  39-year old male with PMhx significant for anxiety/depression, Bipolar 1 disorder, severe hypothyroid, baseline developmental delay/intellectual disorder, seizure disorder (epileptic vs psychogenic; on VPA) who presented to [facility] on 12/27/19 for a chief complaint of Right sided weakness; not a candidate for IV tPA given presentation time greater than 4.5 hours from time of symptom onset. MRI Brain revealed no acute ischemia nor other acute intracranial abnormality; did note diffuse, likely underlying/chronic leukodystrophy (?previously undiagnosed).     Interval, 12/30/19:   Patient is laying in bed; awake and alert. Still with RUE/RLE weakness; suspect at least partially functional (sensory deficit splits midline, positive neal's sign, poor effort with exam; deficits are distractible and inconsistent). Further work up including serum studies and EEG are pending.     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   • Hypothyroidism    • Indigestion     once in a while   • Mental disorder     learning disabilities; speech impairment; developmental delays   • Murmur     since birth   • Pneumonia     remote   • Psychiatric problem 2002     PTSD   • S/P thyroidectomy    • Seizure (HCC)    • Seizure disorder (HCC)    • Unspecified disorder of thyroid        Past surgical history:   Past Surgical History:   Procedure Laterality Date   • EYE SURGERY     • OTHER      Hernia Repair when he was 10 yrs old   • THYROID LOBECTOMY         Family history:   Family History   Problem Relation Age of Onset   • Hypertension Mother    • Heart Disease Mother    • Lung Disease Mother    • Stroke Maternal Grandmother        Social history:   Social History     Socioeconomic History   • Marital status: Single     Spouse name: Not on file   • Number of children: Not on file   • Years of education: Not on file   • Highest education level: Not on file   Occupational History   • Not on file   Social Needs   • Financial resource strain: Not on file   • Food insecurity:     Worry: Not on file     Inability: Not on file   • Transportation needs:     Medical: Not on file     Non-medical: Not on file   Tobacco Use   • Smoking status: Former Smoker     Packs/day: 0.25     Types: Cigarettes, Cigars     Last attempt to quit: 2018     Years since quittin.6   • Smokeless tobacco: Never Used   Substance and Sexual Activity   • Alcohol use: No   • Drug use: Yes     Types: Inhaled     Comment: marijuana   • Sexual activity: Not on file   Lifestyle   • Physical activity:     Days per week: Not on file     Minutes per session: Not on file   • Stress: Not on file   Relationships   • Social connections:     Talks on phone: Not on file     Gets together: Not on file     Attends [employer] service: Not on file     Active member of club or organization: Not on file     Attends meetings of clubs or organizations: Not on file     Relationship status: Not on file   • Intimate partner violence:     Fear of current or ex partner: Not on file     Emotionally abused: Not on file     Physically abused: Not on file     Forced sexual activity: Not on file   Other Topics Concern   • Not on file  "  Social History Narrative   • Not on file       Current medications:   Current Facility-Administered Medications   Medication Dose   • cefTRIAXone (ROCEPHIN) 2 g in  mL IVPB  2 g   • acetaminophen (TYLENOL) tablet 650 mg  650 mg   • NS infusion     • atorvastatin (LIPITOR) tablet 80 mg  80 mg   • busPIRone (BUSPAR) tablet 10 mg  10 mg   • vitamin D (cholecalciferol) 1000 UNIT tablet 2,000 Units  2,000 Units   • divalproex (DEPAKOTE) delayed-release tablet 500-1,500 mg  500-1,500 mg   • fenofibrate micronized (LOFIBRA) capsule 134 mg  134 mg   • insulin glargine (LANTUS) injection 20 Units  20 Units   • latanoprost (XALATAN) 0.005 % ophthalmic solution 1 Drop  1 Drop   • levothyroxine (SYNTHROID) tablet 200 mcg  200 mcg   • montelukast (SINGULAIR) tablet 10 mg  10 mg   • famotidine (PEPCID) tablet 20 mg  20 mg   • sertraline (ZOLOFT) tablet 100 mg  100 mg   • senna-docusate (PERICOLACE or SENOKOT S) 8.6-50 MG per tablet 2 Tab  2 Tab    And   • polyethylene glycol/lytes (MIRALAX) PACKET 1 Packet  1 Packet    And   • magnesium hydroxide (MILK OF MAGNESIA) suspension 30 mL  30 mL    And   • bisacodyl (DULCOLAX) suppository 10 mg  10 mg   • insulin lispro (HUMALOG) injection 1-6 Units  1-6 Units    And   • glucose 4 g chewable tablet 16 g  16 g    And   • DEXTROSE 10% BOLUS 250 mL  250 mL   • aspirin EC (ECOTRIN) tablet 81 mg  81 mg   • enoxaparin (LOVENOX) inj 40 mg  40 mg       Medication Allergy:  Allergies   Allergen Reactions   • Abilify Unspecified     \"Feeling tired, like I don't even know whats going on around me\"   • Fish      Pt reports fish causes him to be sick to his stomach         Review of systems:   As noted above; otherwise all systems reviewed and determined to be negative/non contributory.     Physical examination:   Vitals:    12/29/19 2004 12/30/19 0008 12/30/19 0403 12/30/19 0824   BP: 112/66 107/58 106/67 109/64   Pulse: 66 (!) 57 66 (!) 58   Resp: 20 18 14 16   Temp: 36.6 °C (97.9 °F) 36.2 " °C (97.1 °F) 36.8 °C (98.3 °F) 35.9 °C (96.6 °F)   TempSrc: Temporal Temporal Temporal Temporal   SpO2: 97% 96% 95% 98%   Weight:       Height:         General: Patient in no acute distress.  HEENT: Normocephalic, no signs of acute trauma.   Neck: supple, no meningeal signs or carotid bruits. There is normal range of motion. No tenderness on exam.   Chest: clear to auscultation. No cough.   CV: RRR, no murmurs.   Skin: no signs of acute rashes or trauma.   Musculoskeletal: joints exhibit full range of motion, without any pain to palpation. There are no signs of joint or muscle swelling. There is no tenderness to deep palpation of muscles.   Psychiatric: No hallucinatory behavior. Denies symptoms of depression or suicidal ideation. Mood and affect appear normal on exam.     NEUROLOGICAL EXAM:   Mental status, orientation: Awake, alert and fully oriented.   Speech and language: speech is clear and fluent, stuttered at times; The patient is able to name, repeat and comprehend.   Cranial nerve exam: Pupils are 3-4 mm bilaterally and equally reactive to light. Visual fields are intact by confrontation. There is no nystagmus on primary or secondary gaze. Intact full EOM in all directions of gaze. Face appears symmetric at rest and on activation. Sensation in the face is intact to light touch. Tongue is midline and without any signs of tongue biting or fasciculations.  Shoulder shrug is intact bilaterally.   Motor exam: Strength is 5/5 in LUE/LLE. Patient with no voluntary movement to RUE/RLE; however note positive Romero's sign; patient witnessed to move RUE and RLE while distracted. Tone is normal. No abnormal movements were seen on exam.   Sensory exam Admits to decreased sensation/numbness to RUE/RLE. reveals normal sense of light touch and pinprick in all extremities.   Deep tendon reflexes:  2+ throughout. Plantar responses are flexor. There is no clonus.   Coordination: LUE shows a normal finger-nose-finger. RUE  with no effort. Normal rapidly alternating movements.   Gait: Not assessed at this time as patient is a fall risk.       ANCILLARY DATA REVIEWED:     Lab Data Review:  Recent Results (from the past 24 hour(s))   ACCU-CHEK GLUCOSE    Collection Time: 12/29/19 12:42 PM   Result Value Ref Range    Glucose - Accu-Ck 136 (H) 65 - 99 mg/dL   ACCU-CHEK GLUCOSE    Collection Time: 12/29/19  5:19 PM   Result Value Ref Range    Glucose - Accu-Ck 122 (H) 65 - 99 mg/dL   ACCU-CHEK GLUCOSE    Collection Time: 12/29/19  8:06 PM   Result Value Ref Range    Glucose - Accu-Ck 150 (H) 65 - 99 mg/dL   ACCU-CHEK GLUCOSE    Collection Time: 12/30/19  6:44 AM   Result Value Ref Range    Glucose - Accu-Ck 162 (H) 65 - 99 mg/dL   CBC WITH DIFFERENTIAL    Collection Time: 12/30/19  7:05 AM   Result Value Ref Range    WBC 8.0 4.8 - 10.8 K/uL    RBC 3.89 (L) 4.70 - 6.10 M/uL    Hemoglobin 11.6 (L) 14.0 - 18.0 g/dL    Hematocrit 35.4 (L) 42.0 - 52.0 %    MCV 91.0 81.4 - 97.8 fL    MCH 29.8 27.0 - 33.0 pg    MCHC 32.8 (L) 33.7 - 35.3 g/dL    RDW 44.6 35.9 - 50.0 fL    Platelet Count 271 164 - 446 K/uL    MPV 9.7 9.0 - 12.9 fL    Neutrophils-Polys 53.40 44.00 - 72.00 %    Lymphocytes 27.70 22.00 - 41.00 %    Monocytes 15.40 (H) 0.00 - 13.40 %    Eosinophils 1.20 0.00 - 6.90 %    Basophils 0.60 0.00 - 1.80 %    Immature Granulocytes 1.70 (H) 0.00 - 0.90 %    Nucleated RBC 0.00 /100 WBC    Neutrophils (Absolute) 4.27 1.82 - 7.42 K/uL    Lymphs (Absolute) 2.22 1.00 - 4.80 K/uL    Monos (Absolute) 1.23 (H) 0.00 - 0.85 K/uL    Eos (Absolute) 0.10 0.00 - 0.51 K/uL    Baso (Absolute) 0.05 0.00 - 0.12 K/uL    Immature Granulocytes (abs) 0.14 (H) 0.00 - 0.11 K/uL    NRBC (Absolute) 0.00 K/uL   Basic Metabolic Panel    Collection Time: 12/30/19  7:05 AM   Result Value Ref Range    Sodium 135 135 - 145 mmol/L    Potassium 4.0 3.6 - 5.5 mmol/L    Chloride 101 96 - 112 mmol/L    Co2 24 20 - 33 mmol/L    Glucose 146 (H) 65 - 99 mg/dL    Bun 22 8 - 22 mg/dL     Creatinine 0.96 0.50 - 1.40 mg/dL    Calcium 8.9 8.5 - 10.5 mg/dL    Anion Gap 10.0 0.0 - 11.9   ESTIMATED GFR    Collection Time: 12/30/19  7:05 AM   Result Value Ref Range    GFR If African American >60 >60 mL/min/1.73 m 2    GFR If Non African American >60 >60 mL/min/1.73 m 2   TSH    Collection Time: 12/30/19  9:25 AM   Result Value Ref Range    TSH 9.150 (H) 0.380 - 5.330 uIU/mL   VALPROIC ACID    Collection Time: 12/30/19  9:25 AM   Result Value Ref Range    Valproic Acid 68.0 50.0 - 100.0 ug/mL   AMMONIA    Collection Time: 12/30/19  9:25 AM   Result Value Ref Range    Ammonia 32 11 - 45 umol/L   Lipid Profile    Collection Time: 12/30/19  9:25 AM   Result Value Ref Range    Cholesterol,Tot 126 100 - 199 mg/dL    Triglycerides 529 (H) 0 - 149 mg/dL    HDL 5 (A) >=40 mg/dL    LDL see below <100 mg/dL       Labs reviewed by me.       Imaging reviewed by me:     MR-BRAIN-W/O   Final Result      1.  Moderate diffuse cerebral atrophy.      2.  Stable extensive confluent abnormal increased T2 signal intensity throughout the periventricular and juxtacortical white matter consistent with extensive chronic demyelination or dysmyelination. Additionally a severe toxic metabolic insult could    cause this appearance.      3.  Unchanged elliptical cystic fluid signal intensity collection abutting the left globe superior laterally.      DX-CHEST-PORTABLE (1 VIEW)   Final Result      No acute cardiopulmonary findings.      CT-CTA NECK WITH & W/O-POST PROCESSING   Final Result      1.  No significant stenosis or dissection is identified.      CT-CTA HEAD WITH & W/O-POST PROCESS   Final Result      No large vessel occlusion or aneurysm is identified.      CT-CEREBRAL PERFUSION ANALYSIS   Final Result      1.  Cerebral blood flow less than 30% likely representing completed infarct = 0 mL.      2.  T Max more than 6 seconds likely representing combination of completed infarct and ischemia = 0 mL. 5 mL reported is likely  artifactual      3.  Mismatched volume likely representing ischemic brain/penumbra = no significant      4.  Please note that the cerebral perfusion was performed on the limited brain tissue around the basal ganglia region. Infarct/ischemia outside the CT perfusion sections can be missed in this study.      CT-HEAD W/O   Final Result   Addendum 1 of 1   Addendum:      Confluent white matter changes are similar to multiple prior exams.      Final      MR-BRAIN-W/O    (Results Pending)       ASSESSMENT AND PLAN:  39-year old male with PMhx significant for anxiety/depression, Bipolar 1 disorder, severe hypothyroid, baseline developmental delay/intellectual disorder, seizure disorder (epileptic vs psychogenic; on VPA), and diabetes mellitus who presented to [facility] on 12/27/19 for a chief complaint of Right sided weakness; not a candidate for IV tPA given presentation time greater than 4.5 hours from time of symptom onset. 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 In the setting of negative MRI Brain, initial differential dx include mitochondrial encephalomyelopathy with lactic acidosis and stroke like episodes (MELAS), though will note that this underlying, chronic disease process is best worked up and treated as outpatient (once acute ischemia has been ruled out).  Despite this, there remains little explanation for patient's persistent RUE/RLE weakness/hemiparesis and hemisensory loss. Note near normal TSH; VPA level WNL; Ammonia WNL; have suspicion for at least partial involvement of functional/psychosomatic etiology of symptoms (also given his psychiatric history). For this would recommend to consider psychiatric consultation (inpatient or outpatient). Patient may continue secondary stroke prevention; ASA 81 mg PO q day and Atorvastatin 80 mg PO q HS (note TG > 500, LDL  incalculable; this finding may also be consistent with underlying genetic disorder); continue Lantus and PO antihyperglycemics for diabetes mellitus. Provide counseling regarding life style and risk factor modification for primary stroke prevention.  PT/OT and Case management on board for dispo; additionally, will coordinate outpatient neurology appointment for the patient for further work up of the above.      The plan of care above has been discussed with Dr. Fishman. Other than the above, no further recommendations from a neurological perspective. Please call with questions.     Trinity Corado MSN, BSN, AGNP-C  [facility]

## 2019-12-31 VITALS
HEIGHT: 78 IN | TEMPERATURE: 96.9 F | SYSTOLIC BLOOD PRESSURE: 107 MMHG | DIASTOLIC BLOOD PRESSURE: 71 MMHG | OXYGEN SATURATION: 96 % | HEART RATE: 50 BPM | WEIGHT: 295 LBS | BODY MASS INDEX: 34.13 KG/M2 | RESPIRATION RATE: 16 BRPM

## 2019-12-31 PROBLEM — D72.829 LEUKOCYTOSIS: Status: RESOLVED | Noted: 2018-08-19 | Resolved: 2019-12-31

## 2019-12-31 LAB
AMPHET UR QL SCN: NEGATIVE
BACTERIA UR CULT: ABNORMAL
BACTERIA UR CULT: ABNORMAL
BARBITURATES UR QL SCN: NEGATIVE
BENZODIAZ UR QL SCN: NEGATIVE
BZE UR QL SCN: NEGATIVE
CANNABINOIDS UR QL SCN: NEGATIVE
GLUCOSE BLD-MCNC: 163 MG/DL (ref 65–99)
METHADONE UR QL SCN: NEGATIVE
OPIATES UR QL SCN: NEGATIVE
OXYCODONE UR QL SCN: NEGATIVE
PCP UR QL SCN: NEGATIVE
PROPOXYPH UR QL SCN: NEGATIVE
SIGNIFICANT IND 70042: ABNORMAL
SITE SITE: ABNORMAL
SOURCE SOURCE: ABNORMAL

## 2019-12-31 PROCEDURE — 700105 HCHG RX REV CODE 258: Performed by: NURSE PRACTITIONER

## 2019-12-31 PROCEDURE — A9270 NON-COVERED ITEM OR SERVICE: HCPCS | Performed by: FAMILY MEDICINE

## 2019-12-31 PROCEDURE — 99217 PR OBSERVATION CARE DISCHARGE: CPT | Performed by: HOSPITALIST

## 2019-12-31 PROCEDURE — 700111 HCHG RX REV CODE 636 W/ 250 OVERRIDE (IP): Performed by: FAMILY MEDICINE

## 2019-12-31 PROCEDURE — 82962 GLUCOSE BLOOD TEST: CPT

## 2019-12-31 PROCEDURE — G0378 HOSPITAL OBSERVATION PER HR: HCPCS

## 2019-12-31 PROCEDURE — 96372 THER/PROPH/DIAG INJ SC/IM: CPT

## 2019-12-31 PROCEDURE — 700102 HCHG RX REV CODE 250 W/ 637 OVERRIDE(OP): Performed by: FAMILY MEDICINE

## 2019-12-31 PROCEDURE — 96366 THER/PROPH/DIAG IV INF ADDON: CPT

## 2019-12-31 PROCEDURE — 700111 HCHG RX REV CODE 636 W/ 250 OVERRIDE (IP): Performed by: NURSE PRACTITIONER

## 2019-12-31 RX ORDER — SULFAMETHOXAZOLE AND TRIMETHOPRIM 800; 160 MG/1; MG/1
1 TABLET ORAL 2 TIMES DAILY
Refills: 0
Start: 2019-12-31 | End: 2020-01-07

## 2019-12-31 RX ADMIN — ENOXAPARIN SODIUM 40 MG: 100 INJECTION SUBCUTANEOUS at 05:41

## 2019-12-31 RX ADMIN — SERTRALINE HYDROCHLORIDE 100 MG: 100 TABLET ORAL at 05:50

## 2019-12-31 RX ADMIN — SENNOSIDES AND DOCUSATE SODIUM 2 TABLET: 8.6; 5 TABLET ORAL at 05:41

## 2019-12-31 RX ADMIN — FENOFIBRATE 134 MG: 134 CAPSULE ORAL at 05:50

## 2019-12-31 RX ADMIN — DIVALPROEX SODIUM 500 MG: 500 TABLET, DELAYED RELEASE ORAL at 05:50

## 2019-12-31 RX ADMIN — MELATONIN 2000 UNITS: at 05:42

## 2019-12-31 RX ADMIN — MONTELUKAST 10 MG: 10 TABLET, FILM COATED ORAL at 05:50

## 2019-12-31 RX ADMIN — CEFTRIAXONE SODIUM 2 G: 2 INJECTION, POWDER, FOR SOLUTION INTRAMUSCULAR; INTRAVENOUS at 05:41

## 2019-12-31 RX ADMIN — BUSPIRONE HYDROCHLORIDE 10 MG: 10 TABLET ORAL at 05:50

## 2019-12-31 RX ADMIN — LEVOTHYROXINE SODIUM 200 MCG: 200 TABLET ORAL at 05:50

## 2019-12-31 RX ADMIN — ASPIRIN 81 MG: 81 TABLET, COATED ORAL at 05:42

## 2019-12-31 NOTE — DISCHARGE PLANNING
Spoke to Shannon @ [facility]    Transport is scheduled for 12/31 @ 1015 going to [facility]. Tonia([facility] CM) notified of transport time.

## 2019-12-31 NOTE — PROGRESS NOTES
Rounded on patient. Patient appears to be sleeping with equal chest rise and fall. Patient urinal emptied.

## 2019-12-31 NOTE — DISCHARGE SUMMARY
Discharge Summary    CHIEF COMPLAINT ON ADMISSION  Right-sided weakness     Reason for Admission  Stroke     CODE STATUS  Full Code    HPI & HOSPITAL COURSE  This is a 38-year-old male with a past medical history of bipolar disorder, mental disorder, glaucoma, hypothyroidism, seizure disorder, diabetes, anxiety, asthma admitted to the ED with complaints of new onset right-sided weakness.  He was evaluated by neurology in the ED and determined not to be a TPA candidate.  CT head was unremarkable.  CTA head and neck were within normal limits.  MRI brain was negative for stroke but did reveal findings consistent with genetic leukodystrophy.  Pending work-up for MELAS and CADASIL.     Interval Problem Update  12/28: Continues to complain of right upper and lower extremity paralysis.  Continuing with physical and Occupational Therapy.  He is noted to have positive reflexes to the right side as well as muscle contraction when working with therapy despite his reports of being unable to move the right side.  These findings are more consistent with MELAS.  He will likely need aggressive therapies.  Denies acute pain, shortness of breath, nausea, or vomiting.  12/29:  Noted to have UTI.  Started on antibx.  Afebrile.  VSS.  He continues to have an inconsistent exam when testing mobility of his right upper and lower extremities.  Continue aggressive PT/OT.  12/30  EEG negative.  Repeat MRI brain unchanged.  Noted to be moving right arm and leg by staff.  Continues to report right-sided numbness and paralysis, although reflexes are intact.  Neuro exam not consistent with paralysis.  Suspect psychiatric aspect.  Denies pain or shortness of breath.  12/31: Discharge to SNF today. No acute changes overnight. Urine culture positive for lactose-fermenting GNR. Will discharge with 7-day course of Bactrim .             Therefore, he is discharged in fair and stable condition to skilled nursing facility.    The patient recovered much  more quickly than anticipated on admission.      FOLLOW UP ITEMS POST DISCHARGE  12/31/2019    DISCHARGE DIAGNOSES  Active Problems:    Right hemiparesis (HCC) POA: Yes    Seizure (HCC) POA: Yes    Type 2 diabetes mellitus without complication, without long-term current use of insulin (HCC) POA: Yes    HLD (hyperlipidemia) POA: Yes    CVA (cerebral vascular accident) (HCC) POA: Unknown    UTI (urinary tract infection) POA: Yes    Acquired hypothyroidism POA: Yes  Resolved Problems:    Leukocytosis POA: Yes      FOLLOW UP  No future appointments.  [facility]  [address] [facility]  [place] [address]  [phone]        ANIKET Davis  [address] [facility] [id number] [phone]    Schedule an appointment as soon as possible for a visit  Post-hospitalization management      MEDICATIONS ON DISCHARGE     Medication List      START taking these medications      Instructions   sulfamethoxazole-trimethoprim 800-160 MG tablet  Commonly known as:  BACTRIM DS   Take 1 Tab by mouth 2 times a day for 7 days.  Dose:  1 Tab        CONTINUE taking these medications      Instructions   aspirin EC 81 MG Tbec  Commonly known as:  ECOTRIN   Take 81 mg by mouth every day.  Dose:  81 mg     atorvastatin 80 MG tablet  Commonly known as:  LIPITOR   Take 80 mg by mouth every evening.  Dose:  80 mg     busPIRone 10 MG Tabs tablet  Commonly known as:  BUSPAR   Take 10 mg by mouth 3 times a day.  Dose:  10 mg     divalproex 500 MG Tbec  Commonly known as:  DEPAKOTE   Take 500-1,500 mg by mouth 2 Times a Day. 500mg in AM  1500mg at PM  Dose:  500-1,500 mg     fenofibrate 130 MG capsule  Commonly known as:  ANTARA   Take 130 mg by mouth every morning.  Dose:  130 mg     glimepiride 4 MG Tabs  Commonly known as:  AMARYL   Take 4 mg by mouth every morning.  Dose:  4 mg     insulin glargine 100 UNIT/ML Sopn injection  Generic drug:  insulin glargine   Inject 20 Units as instructed every evening.  Dose:  20 Units     JARDIANCE 25 MG  "Tabs  Generic drug:  Empagliflozin   Take 25 mg by mouth every day.  Dose:  25 mg     levothyroxine 200 MCG Tabs  Commonly known as:  SYNTHROID   Take 200 mcg by mouth Every morning on an empty stomach.  Dose:  200 mcg     lisinopril 10 MG Tabs  Commonly known as:  PRINIVIL   Take 10 mg by mouth every day.  Dose:  10 mg     metformin 1000 MG tablet  Commonly known as:  GLUCOPHAGE   Take 1,000 mg by mouth 2 times a day.  Dose:  1,000 mg     montelukast 10 MG Tabs  Commonly known as:  SINGULAIR   Take 10 mg by mouth every day.  Dose:  10 mg     prazosin 2 MG Caps  Commonly known as:  MINIPRESS   Take 2 mg by mouth every evening.  Dose:  2 mg     raNITidine 150 MG Tabs  Commonly known as:  ZANTAC   Take 150 mg by mouth 2 times a day.  Dose:  150 mg     sertraline 100 MG Tabs  Commonly known as:  ZOLOFT   Take 100 mg by mouth every day.  Dose:  100 mg     travoprost 0.004 % Soln  Commonly known as:  TRAVATAN Z   Place 1 Drop in both eyes every evening.  Dose:  1 Drop     vitamin D 2000 UNIT Tabs   Take 2,000 Units by mouth every day.  Dose:  2,000 Units     XALATAN 0.005 % Soln  Generic drug:  latanoprost   Place 1 Drop in both eyes every evening.  Dose:  1 Drop            Allergies  Allergies   Allergen Reactions   • Abilify Unspecified     \"Feeling tired, like I don't even know whats going on around me\"   • Fish      Pt reports fish causes him to be sick to his stomach         DIET  Orders Placed This Encounter   Procedures   • Diet Order Cardiac     Standing Status:   Standing     Number of Occurrences:   1     Order Specific Question:   Diet:     Answer:   Cardiac [6]       ACTIVITY  As tolerated.  Weight bearing as tolerated    LINES, DRAINS, AND WOUNDS  This is an automated list. Peripheral IVs will be removed prior to discharge.  Peripheral IV 12/30/19 20 G Right Upper arm (Active)   Site Assessment Clean;Dry;Intact 12/30/2019  8:00 PM   Dressing Type Transparent 12/30/2019  8:00 PM   Line Status Infusing " 12/30/2019  8:00 PM   Dressing Status Clean;Dry;Intact 12/30/2019  8:00 PM   Dressing Intervention N/A 12/30/2019  8:00 PM   Date Primary Tubing Changed 12/30/19 12/30/2019  8:00 PM   Date Secondary Tubing Changed 12/30/19 12/30/2019  8:00 PM   NEXT Primary Tubing Change  01/02/20 12/30/2019  8:00 PM   NEXT Secondary Tubing Change  12/31/19 12/30/2019  8:00 PM   Infiltration Grading ([facility], [facility]) 0 12/30/2019  8:00 PM   Phlebitis Scale ([facility] Only) 0 12/30/2019  8:00 PM          Peripheral IV 12/30/19 20 G Right Upper arm (Active)   Site Assessment Clean;Dry;Intact 12/30/2019  8:00 PM   Dressing Type Transparent 12/30/2019  8:00 PM   Line Status Infusing 12/30/2019  8:00 PM   Dressing Status Clean;Dry;Intact 12/30/2019  8:00 PM   Dressing Intervention N/A 12/30/2019  8:00 PM   Date Primary Tubing Changed 12/30/19 12/30/2019  8:00 PM   Date Secondary Tubing Changed 12/30/19 12/30/2019  8:00 PM   NEXT Primary Tubing Change  01/02/20 12/30/2019  8:00 PM   NEXT Secondary Tubing Change  12/31/19 12/30/2019  8:00 PM   Infiltration Grading ([facility], [facility]) 0 12/30/2019  8:00 PM   Phlebitis Scale ([facility] Only) 0 12/30/2019  8:00 PM               MENTAL STATUS ON TRANSFER  Level of Consciousness: Alert  Orientation : Oriented x 4  Speech: Speech Clear    CONSULTATIONS  Neurology     PROCEDURES  None     LABORATORY  Lab Results   Component Value Date    SODIUM 135 12/30/2019    POTASSIUM 4.0 12/30/2019    CHLORIDE 101 12/30/2019    CO2 24 12/30/2019    GLUCOSE 146 (H) 12/30/2019    BUN 22 12/30/2019    CREATININE 0.96 12/30/2019        Lab Results   Component Value Date    WBC 8.0 12/30/2019    HEMOGLOBIN 11.6 (L) 12/30/2019    HEMATOCRIT 35.4 (L) 12/30/2019    PLATELETCT 271 12/30/2019        Total time of the discharge process exceeds 35 minutes.

## 2019-12-31 NOTE — DISCHARGE PLANNING
GABY assisting CM with Pt transfer to [facility]. GABY contacted Admission Coordinator Cliff and she has given Pt room number as 503 and admitting physician is Dr. Lomeli.    GABY updated CM Aiyana.

## 2019-12-31 NOTE — CARE PLAN
Problem: Discharge Barriers/Planning  Goal: Patient's continuum of care needs will be met  Outcome: PROGRESSING AS EXPECTED     Problem: Safety  Goal: Will remain free from injury  Outcome: PROGRESSING AS EXPECTED

## 2019-12-31 NOTE — DISCHARGE INSTRUCTIONS
Discharge Instructions    Discharged to other by medical transportation with escort. Discharged via wheelchair, hospital escort: Yes.  Special equipment needed: Not Applicable    Be sure to schedule a follow-up appointment with your primary care doctor or any specialists as instructed.     Discharge Plan:   Diet Plan: Discussed  Activity Level: Discussed  Confirmed Follow up Appointment: Patient to Call and Schedule Appointment  Confirmed Symptoms Management: Discussed  Medication Reconciliation Updated: Yes  Influenza Vaccine Indication: Not indicated: Previously immunized this influenza season and > 8 years of age    I understand that a diet low in cholesterol, fat, and sodium is recommended for good health. Unless I have been given specific instructions below for another diet, I accept this instruction as my diet prescription.   Other diet: DIAB    Special Instructions: None    · Is patient discharged on Warfarin / Coumadin?   No     Depression / Suicide Risk    As you are discharged from this [facility] facility, it is important to learn how to keep safe from harming yourself.    Recognize the warning signs:  · Abrupt changes in personality, positive or negative- including increase in energy   · Giving away possessions  · Change in eating patterns- significant weight changes-  positive or negative  · Change in sleeping patterns- unable to sleep or sleeping all the time   · Unwillingness or inability to communicate  · Depression  · Unusual sadness, discouragement and loneliness  · Talk of wanting to die  · Neglect of personal appearance   · Rebelliousness- reckless behavior  · Withdrawal from people/activities they love  · Confusion- inability to concentrate     If you or a loved one observes any of these behaviors or has concerns about self-harm, here's what you can do:  · Talk about it- your feelings and reasons for harming yourself  · Remove any means that you might use to hurt yourself (examples: pills,  rope, extension cords, firearm)  · Get professional help from the community (Mental Health, Substance Abuse, psychological counseling)  · Do not be alone:Call your Safe Contact- someone whom you trust who will be there for you.  · Call your local CRISIS HOTLINE 100-8645 or 528-804-8287  · Call your local Children's Mobile Crisis Response Team [place] (643) 688-9857 or www.PopularMedia  · Call the toll free National Suicide Prevention Hotlines   · National Suicide Prevention Lifeline 623-340-LOIY (7067)  · National Hope Line Network 800-SUICIDE (809-6240)

## 2020-01-30 ENCOUNTER — HOME HEALTH ADMISSION (OUTPATIENT)
Dept: HOME HEALTH SERVICES | Facility: HOME HEALTHCARE | Age: 40
End: 2020-01-30
Payer: MEDICAID

## 2020-02-13 NOTE — CARE PLAN
Discussed lid hygiene, warm compress and eyelid wash. Problem: Knowledge Deficit  Goal: Knowledge of disease process/condition, treatment plan, diagnostic tests, and medications will improve  Outcome: PROGRESSING AS EXPECTED  Discussed plan of care for today w/ pt this am, he is A+O x 4, he verbalizes understanding of his plan of care, no questions. Emotional support given. Reinforcing education as necessary. Discussed pt's care with Hospitalist Dr. Hoyos during her rounds, orders received.       Problem: Pain Management  Goal: Pain level will decrease to patient's comfort goal  Outcome: PROGRESSING AS EXPECTED  Pt c/o HA 4/10. Given tylenol po prn. Assessing every four hrs for pain per protocol; see doc flowsheets

## 2020-02-26 ENCOUNTER — HOSPITAL ENCOUNTER (EMERGENCY)
Facility: MEDICAL CENTER | Age: 40
End: 2020-02-27
Attending: EMERGENCY MEDICINE
Payer: MEDICAID

## 2020-02-26 DIAGNOSIS — F41.8 SITUATIONAL ANXIETY: ICD-10-CM

## 2020-02-26 PROCEDURE — 99284 EMERGENCY DEPT VISIT MOD MDM: CPT

## 2020-02-26 SDOH — HEALTH STABILITY: MENTAL HEALTH: HOW OFTEN DO YOU HAVE 6 OR MORE DRINKS ON ONE OCCASION?: DAILY OR ALMOST DAILY

## 2020-02-26 SDOH — HEALTH STABILITY: MENTAL HEALTH: HOW MANY STANDARD DRINKS CONTAINING ALCOHOL DO YOU HAVE ON A TYPICAL DAY?: 3 OR 4

## 2020-02-26 SDOH — HEALTH STABILITY: MENTAL HEALTH: HOW OFTEN DO YOU HAVE A DRINK CONTAINING ALCOHOL?: 4 OR MORE TIMES A WEEK

## 2020-02-26 ASSESSMENT — LIFESTYLE VARIABLES: DO YOU DRINK ALCOHOL: NO

## 2020-02-27 VITALS
OXYGEN SATURATION: 97 % | TEMPERATURE: 97.6 F | HEART RATE: 62 BPM | BODY MASS INDEX: 34.41 KG/M2 | DIASTOLIC BLOOD PRESSURE: 79 MMHG | WEIGHT: 297.4 LBS | HEIGHT: 78 IN | RESPIRATION RATE: 16 BRPM | SYSTOLIC BLOOD PRESSURE: 126 MMHG

## 2020-02-27 PROCEDURE — A9270 NON-COVERED ITEM OR SERVICE: HCPCS | Performed by: EMERGENCY MEDICINE

## 2020-02-27 PROCEDURE — 700102 HCHG RX REV CODE 250 W/ 637 OVERRIDE(OP): Performed by: EMERGENCY MEDICINE

## 2020-02-27 PROCEDURE — 90791 PSYCH DIAGNOSTIC EVALUATION: CPT

## 2020-02-27 RX ORDER — LORAZEPAM 1 MG/1
1 TABLET ORAL ONCE
Status: COMPLETED | OUTPATIENT
Start: 2020-02-27 | End: 2020-02-27

## 2020-02-27 RX ADMIN — LORAZEPAM 1 MG: 1 TABLET ORAL at 01:15

## 2020-02-27 ASSESSMENT — ENCOUNTER SYMPTOMS: ABDOMINAL PAIN: 0

## 2020-02-27 NOTE — ED NOTES
Patient given medication per MD order and will discharge home. 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 Patient feeling better and more calm ready to go home at this time.

## 2020-02-27 NOTE — CONSULTS
[facility]   TRIAGE ASSESSMENT    Name: Norm Prabhakar  MRN: [id number]  : 1980  Age: 39 y.o.  Date of assessment: 2020  PCP: ANIKET Davis  Persons in attendance: Patient    CHIEF COMPLAINT/PRESENTING ISSUE (as stated by pt, mirtha, rn): This 39y male presents in the er with complaints of increased stress from chronic health issues and finances, along with an apparent baseline of relentless anxiety. He recently lost 600 dollars in cash that he was going to use for rent and money to help his mom. He has a hx of bipolar1 disorder, mdd, anxiety and borderline features. He also suffers from a slue of medical problems(which he seems to be addressing.) However he adamantly denies any si/hi, but he has had problems with anger outburst recently with staff and peers at his residing group home. He also presents in the er today because he thinks his psy meds are not working well. He claims he is compliant.   Chief Complaint   Patient presents with   • Psych Eval     Pt [facility]. Pt states he has increased stress, causing him to yell/break items. Pt lives at [facility]. Reports meds don't seem to be helping him- wants to review meds/ make adjustments.         CURRENT LIVING SITUATION/SOCIAL SUPPORT: This pt has two daughters that live in [place]. He does have some contact with them, along with contact with some friends and relatives. His mom lives in [place] and he helps her out financially. But it appears that his social support is nominal and is lacking. However he has a new girlfriend in [place] [facility].. and [place]. From a distance she gives him something to look forward to. He does work odd jobs and receives ssdi.   BEHAVIORAL HEALTH TREATMENT HISTORY  Does patient/parent report a history of prior behavioral health treatment for patient?   No: pt denies any inpt tx history. [facility] with op therapist and  "psychiatry who manages his meds. Currently he is on:Buspar depakote, minipress and zoloft. He used [facility] for years prior to switching to [facility].    SAFETY ASSESSMENT - SELF  Does patient acknowledge current or past symptoms of dangerousness to self? No ( except for occasional anger out burst)  Does parent/significant other report patient has current or past symptoms of dangerousness to self? N\A  Does presenting problem suggest symptoms of dangerousness to self? No pt adamantly denies any si or plan to harm himself and denies any hx of self harm except for some occasional superficial cutting over the years.( none for over five years.) He denies any access to a firearm and denies any hx of family or friend suicide.     SAFETY ASSESSMENT - OTHERS  Does patient acknowledge current or past symptoms of aggressive behavior or risk to others? Yes hx of having a bad temper but has never caused harm to any one.  Does parent/significant other report patient has current or past symptoms of aggressive behavior or risk to others?  N\A  Does presenting problem suggest symptoms of dangerousness to others? Noadamantly denies any plan to hurt any specific person and claims his hi was feeling mad at the world earlier but \"feels much better now\", after processing with someone objective.    Crisis Safety Plan completed and copy given to patient? Yes actually gave this pt op referrals for hotlines and he agreed to call numbers or return to the er if any si or hi where to develop. He has back up services at [facility]. He denies firearm access and presently appears safe from harming himself or others.(adamantly denies) He was instructed on what to look for signs of a psychological crisis and how to resolve it. He was given op referrals as well.    ABUSE/NEGLECT SCREENING  Does patient report feeling “unsafe” in his/her home, or afraid of anyone?  no  Does patient report any history of physical, sexual, or emotional abuse?  no  Does " "parent or significant other report any of the above? N\A  Is there evidence of neglect by self?  no  Is there evidence of neglect by a caregiver? no  Does the patient/parent report any history of CPS/APS/police involvement related to suspected abuse/neglect or domestic violence? no  Based on the information provided during the current assessment, is a mandated report of suspected abuse/neglect being made?  No    SUBSTANCE USE SCREENING  Yes:  Ehsan all substances used in the past 30 days:      Last Use Amount   [x]   Alcohol Last week  One week ago 5 shots of hard liquor about 1x a month   []   Marijuana     []   Heroin     []   Prescription Opioids  (used without prescription, for    recreation, or in excess of prescribed amount)     []   Other Prescription  (used without prescription, for    recreation, or in excess of prescribed amount)     []   Cocaine      []   Methamphetamine     []   \"\" drugs (ectasy, MDMA)     []   Other substances        UDS results: neg  Breathalyzer results: neg    What consequences does the patient associate with any of the above substance use and or addictive behaviors? Relationship problems: , Monetary problems:     Risk factors for detox (check all that apply):  []  Seizures   []  Diaphoretic (sweating)   []  Tremors   []  Hallucinations   []  Increased blood pressure   []  Decreased blood pressure   []  Other   [x]  None      [] Patient education on risk factors for detoxification and instructed to return to ER as needed.na      MENTAL STATUS   Participation: Active verbal participation, Attentive, Engaged, Open to feedback and Guarded  Grooming: Casual and Neat  Orientation: Alert and Fully Oriented  Behavior: Calm and Tense  Eye contact: Good  Mood: Anxious  Affect: Constricted, Congruent with content, Sad and Anxious  Thought process: Logical and Goal-directed  Thought content: Within normal limits  Speech: Rate within normal limits and Volume within normal " limits  Perception: Within normal limits  Memory:  No gross evidence of memory deficits  Insight: Adequate  Judgment:  Adequate  Other:    Collateral information:   Source:  [] Significant other present in person:   [] Significant other by telephone  [] [facility]   [x] [facility] Nursing Staff  [x] [facility]  [x] Other:erp     [] Unable to complete full assessment due to:  [] Acute intoxication  [] Patient declined to participate/engage  [] Patient verbally unresponsive  [] Significant cognitive deficits  [] Significant perceptual distortions or behavioral disorganization  [x] Other: na     CLINICAL IMPRESSIONS:  Primary: reactive depression and anxiety    Secondary:alcohol abuse hx with some borderline features        IDENTIFIED NEEDS/PLAN:  [Trigger DISPOSITION list for any items marked]    []  Imminent safety risk - self [] Imminent safety risk - others   []  Acute substance withdrawal []  Psychosis/Impaired reality testing   [x]  Mood/anxiety [x]  alcohol abuse/Addictive behavior   [x]  Maladaptive behaviro []  Parent/child conflict   []  Family/Couples conflict [x]  Biomedical   []  Housing [x]  Financial   []   Legal  Occupational/Educational   []  Domestic violence []  Other:     Disposition: Actively being addressed by 12 Step program: [facility] op, Primary Care Physician and aa program    Does patient express agreement with the above plan? yes    Referral appointment(s) scheduled? no    Alert team only:   I have discussed findings and recommendations with Dr. Miller who is in agreement with these recommendations. 39y male presents with reactive depression and anxiety. He presently denies any si or hi.  Op safety plans and resources have blayne discussed and given to this pt. He was discharged back to his group home via [facility]. 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     Referral information sent to the following community providers  :na    If applicable : Referred  to :maxwell Barahona R.N.  2/27/2020

## 2020-02-27 NOTE — ED NOTES
Pt comes in volutarily today. He recently lost some money and is worried that he wont be able to pay his rent and will become homeless. This has caused him to be very angry and he began to break things and act out. He does not want to harm anyone but is worried he might do so if he does not get his anxiety and anger under control. Pt is very polite and cooperative with staff. Given ha crackers and a sprite at his request.

## 2020-02-27 NOTE — ED TRIAGE NOTES
Norm Prabhakar  39 y.o. male  Chief Complaint   Patient presents with   • Psych Eval     [facility]. Pt states he has increased stress, causing him to yell/break items. Pt lives at [facility] Living. Reports meds don't seem to be helping him- wants to review meds/ make adjustments.      Denies SI/HI.   Pt amb to triage with steady gait for above complaint.   Pt is alert and oriented, speaking in full sentences, follows commands and responds appropriately to questions. Resp are even and unlabored. No behavioral indicators of pain.   Pt placed in lobby. Pt educated on triage process. Pt encouraged to alert staff for any changes.

## 2020-02-27 NOTE — ED PROVIDER NOTES
"ED Provider Note    Primary care provider: ANIKET Davis  Means of arrival: Ambulance  History obtained from: patient  History limited by: none    CHIEF COMPLAINT  Chief Complaint   Patient presents with   • Psych Eval     Pt [facility]. Pt states he has increased stress, causing him to yell/break items. Pt lives at [facility]. Living. Reports meds don't seem to be helping him- wants to review meds/ make adjustments.        HPI  Norm Prabhakar is a 39 y.o. male who presents to the Emergency Department for evaluation of anger. Patient reports that he has bipolar disorder and today he became angry after he lost his money. He states that he became angry because if he is unable to make his rent this month he will be on the streets and he does not want to be homeless again.  Patient states that he was being very agitated with his roommates and \"snappy.\"  He came here to seek help to calm down.  He denies any homicidal or suicidal ideations.  He has no medical complaints at this time.    REVIEW OF SYSTEMS  Review of Systems   Cardiovascular: Negative for chest pain.   Gastrointestinal: Negative for abdominal pain.   Psychiatric/Behavioral: Negative for suicidal ideas.   All other systems reviewed and are negative.      PAST MEDICAL HISTORY   has a past medical history of Anxiety, ASTHMA, Bipolar 1 disorder (HCC), Depression, Fall, Glaucoma, Glaucoma (), Hypothyroidism, Indigestion, Mental disorder, Murmur, Pneumonia, Psychiatric problem (), S/P thyroidectomy, Seizure (HCC) (), Seizure disorder (HCC), and Unspecified disorder of thyroid.    SURGICAL HISTORY   has a past surgical history that includes eye surgery; thyroid lobectomy; and other.    SOCIAL HISTORY  Social History     Tobacco Use   • Smoking status: Current Every Day Smoker     Packs/day: 0.25     Types: Cigarettes, Cigars     Last attempt to quit: 2018     Years since quittin.8   • Smokeless tobacco: Never Used   Substance " "Use Topics   • Alcohol use: Yes     Frequency: 4 or more times a week     Drinks per session: 3 or 4     Binge frequency: Daily or almost daily   • Drug use: Yes     Types: Inhaled     Comment: marijuana      Social History     Substance and Sexual Activity   Drug Use Yes   • Types: Inhaled    Comment: marijuana       FAMILY HISTORY  Family History   Problem Relation Age of Onset   • Hypertension Mother    • Heart Disease Mother    • Lung Disease Mother    • Stroke Maternal Grandmother        CURRENT MEDICATIONS  Home Medications     Reviewed by Michelle Thacker R.N. (Registered Nurse) on 02/26/20 at 2235  Med List Status: Not Addressed   Medication Last Dose Status   aspirin EC (ECOTRIN) 81 MG Tablet Delayed Response  Active   atorvastatin (LIPITOR) 80 MG tablet  Active   busPIRone (BUSPAR) 10 MG Tab tablet  Active   Cholecalciferol (VITAMIN D) 2000 UNIT Tab  Active   divalproex (DEPAKOTE) 500 MG Tablet Delayed Response  Active   Empagliflozin (JARDIANCE) 25 MG Tab  Active   fenofibrate (ANTARA) 130 MG capsule  Active   glimepiride (AMARYL) 4 MG Tab  Active   insulin glargine (BASAGLAR KWIKPEN) 100 UNIT/ML Solution Pen-injector injection  Active   latanoprost (XALATAN) 0.005 % Solution  Active   levothyroxine (SYNTHROID) 200 MCG Tab  Active   lisinopril (PRINIVIL) 10 MG Tab  Active   metformin (GLUCOPHAGE) 1000 MG tablet  Active   montelukast (SINGULAIR) 10 MG Tab  Active   prazosin (MINIPRESS) 2 MG Cap  Active   raNITidine (ZANTAC) 150 MG Tab  Active   sertraline (ZOLOFT) 100 MG Tab  Active   travoprost (TRAVATAN Z) 0.004 % Solution  Active                ALLERGIES  Allergies   Allergen Reactions   • Abilify Unspecified     \"Feeling tired, like I don't even know whats going on around me\"   • Fish      Pt reports fish causes him to be sick to his stomach         PHYSICAL EXAM  VITAL SIGNS: /86   Pulse 65   Temp 36.4 °C (97.6 °F) (Temporal)   Resp 16   Ht 1.981 m (6' 6\")   Wt (!) 134.9 kg (297 lb 6.4 oz)  "  SpO2 96%   BMI 34.37 kg/m²   Vitals reviewed by myself.  Physical Exam  Nursing note and vitals reviewed.  Constitutional: Well-developed and well-nourished. No acute distress.   HENT: Head is normocephalic and atraumatic.  Eyes: extra-ocular movements intact  Cardiovascular: regular rate and  regular rhythm. No murmur heard.  Pulmonary/Chest: Breath sounds normal. No wheezes or rales.   Musculoskeletal: Extremities exhibit normal range of motion without edema or tenderness.   Neurological: Awake and alert  Skin: Skin is warm and dry. No rash.       DIAGNOSTIC STUDIES /  LABS  None      REASSESMENT  11:55 PM - Patient seen and evaluated at bedside. Discussed the plan for evaluation by [facility]. Patient verbalizes understanding and agreement to this plan of care.     1:07 AM - Discussed plan with [facility].        COURSE & MEDICAL DECISION MAKING  Nursing notes, VS, PMSFHx reviewed in chart.    Patient is a 39-year-old male who comes in for evaluation of anxiety.  Differential diagnosis includes situational anxiety, behavior disorder, depression.  On exam patient is well-appearing with vitals in normal limits.  He has no intent to harm others or himself therefore I do not believe a legal hold is indicated.  I had [facility] speak with patient regarding outpatient resources to help him with the difficult situation he is going through.  They provided him with daily resources as well so that he can make extra money to make his rent.  Patient is given a dose of Ativan to help him calm down in the emergency department and given strict return precautions.  He is then discharged in stable condition.        FINAL IMPRESSION  1. Situational anxiety

## 2020-02-29 ENCOUNTER — APPOINTMENT (OUTPATIENT)
Dept: RADIOLOGY | Facility: MEDICAL CENTER | Age: 40
DRG: 948 | End: 2020-02-29
Attending: EMERGENCY MEDICINE
Payer: MEDICAID

## 2020-02-29 ENCOUNTER — HOSPITAL ENCOUNTER (INPATIENT)
Facility: MEDICAL CENTER | Age: 40
LOS: 2 days | DRG: 948 | End: 2020-03-04
Attending: EMERGENCY MEDICINE | Admitting: HOSPITALIST
Payer: MEDICAID

## 2020-02-29 DIAGNOSIS — R53.1 WEAKNESS: ICD-10-CM

## 2020-02-29 DIAGNOSIS — I63.00 CEREBROVASCULAR ACCIDENT (CVA) DUE TO THROMBOSIS OF PRECEREBRAL ARTERY (HCC): ICD-10-CM

## 2020-02-29 PROBLEM — I10 HTN (HYPERTENSION): Status: ACTIVE | Noted: 2020-02-29

## 2020-02-29 LAB
ALBUMIN SERPL BCP-MCNC: 4.6 G/DL (ref 3.2–4.9)
ALBUMIN/GLOB SERPL: 1.9 G/DL
ALP SERPL-CCNC: 44 U/L (ref 30–99)
ALT SERPL-CCNC: 11 U/L (ref 2–50)
AMPHET UR QL SCN: NEGATIVE
ANION GAP SERPL CALC-SCNC: 13 MMOL/L (ref 0–11.9)
APPEARANCE UR: CLEAR
AST SERPL-CCNC: 16 U/L (ref 12–45)
BARBITURATES UR QL SCN: NEGATIVE
BASOPHILS # BLD AUTO: 0.8 % (ref 0–1.8)
BASOPHILS # BLD: 0.08 K/UL (ref 0–0.12)
BENZODIAZ UR QL SCN: NEGATIVE
BILIRUB SERPL-MCNC: 0.7 MG/DL (ref 0.1–1.5)
BILIRUB UR QL STRIP.AUTO: NEGATIVE
BUN SERPL-MCNC: 26 MG/DL (ref 8–22)
BZE UR QL SCN: NEGATIVE
CALCIUM SERPL-MCNC: 9.3 MG/DL (ref 8.5–10.5)
CANNABINOIDS UR QL SCN: NEGATIVE
CHLORIDE SERPL-SCNC: 100 MMOL/L (ref 96–112)
CO2 SERPL-SCNC: 22 MMOL/L (ref 20–33)
COLOR UR: YELLOW
CREAT SERPL-MCNC: 1.44 MG/DL (ref 0.5–1.4)
CRP SERPL HS-MCNC: 0.13 MG/DL (ref 0–0.75)
EKG IMPRESSION: NORMAL
EKG IMPRESSION: NORMAL
EOSINOPHIL # BLD AUTO: 0.06 K/UL (ref 0–0.51)
EOSINOPHIL NFR BLD: 0.6 % (ref 0–6.9)
ERYTHROCYTE [DISTWIDTH] IN BLOOD BY AUTOMATED COUNT: 46.2 FL (ref 35.9–50)
ERYTHROCYTE [SEDIMENTATION RATE] IN BLOOD BY WESTERGREN METHOD: 5 MM/HOUR (ref 0–15)
GLOBULIN SER CALC-MCNC: 2.4 G/DL (ref 1.9–3.5)
GLUCOSE BLD-MCNC: 103 MG/DL (ref 65–99)
GLUCOSE SERPL-MCNC: 104 MG/DL (ref 65–99)
GLUCOSE UR STRIP.AUTO-MCNC: >=1000 MG/DL
HCT VFR BLD AUTO: 44.3 % (ref 42–52)
HGB BLD-MCNC: 14.1 G/DL (ref 14–18)
IMM GRANULOCYTES # BLD AUTO: 0.11 K/UL (ref 0–0.11)
IMM GRANULOCYTES NFR BLD AUTO: 1.1 % (ref 0–0.9)
KETONES UR STRIP.AUTO-MCNC: 15 MG/DL
LEUKOCYTE ESTERASE UR QL STRIP.AUTO: NEGATIVE
LYMPHOCYTES # BLD AUTO: 3.49 K/UL (ref 1–4.8)
LYMPHOCYTES NFR BLD: 33.9 % (ref 22–41)
MCH RBC QN AUTO: 28.4 PG (ref 27–33)
MCHC RBC AUTO-ENTMCNC: 31.8 G/DL (ref 33.7–35.3)
MCV RBC AUTO: 89.3 FL (ref 81.4–97.8)
METHADONE UR QL SCN: NEGATIVE
MICRO URNS: ABNORMAL
MONOCYTES # BLD AUTO: 0.83 K/UL (ref 0–0.85)
MONOCYTES NFR BLD AUTO: 8.1 % (ref 0–13.4)
NEUTROPHILS # BLD AUTO: 5.71 K/UL (ref 1.82–7.42)
NEUTROPHILS NFR BLD: 55.5 % (ref 44–72)
NITRITE UR QL STRIP.AUTO: NEGATIVE
NRBC # BLD AUTO: 0 K/UL
NRBC BLD-RTO: 0 /100 WBC
OPIATES UR QL SCN: NEGATIVE
OXYCODONE UR QL SCN: NEGATIVE
PCP UR QL SCN: NEGATIVE
PH UR STRIP.AUTO: 5.5 [PH] (ref 5–8)
PLATELET # BLD AUTO: 414 K/UL (ref 164–446)
PMV BLD AUTO: 9.5 FL (ref 9–12.9)
POTASSIUM SERPL-SCNC: 4.4 MMOL/L (ref 3.6–5.5)
PROPOXYPH UR QL SCN: NEGATIVE
PROT SERPL-MCNC: 7 G/DL (ref 6–8.2)
PROT UR QL STRIP: NEGATIVE MG/DL
RBC # BLD AUTO: 4.96 M/UL (ref 4.7–6.1)
RBC UR QL AUTO: NEGATIVE
SODIUM SERPL-SCNC: 135 MMOL/L (ref 135–145)
SP GR UR STRIP.AUTO: 1.02
TROPONIN T SERPL-MCNC: 19 NG/L (ref 6–19)
UROBILINOGEN UR STRIP.AUTO-MCNC: 0.2 MG/DL
WBC # BLD AUTO: 10.3 K/UL (ref 4.8–10.8)

## 2020-02-29 PROCEDURE — G0378 HOSPITAL OBSERVATION PER HR: HCPCS

## 2020-02-29 PROCEDURE — 84484 ASSAY OF TROPONIN QUANT: CPT

## 2020-02-29 PROCEDURE — 84443 ASSAY THYROID STIM HORMONE: CPT

## 2020-02-29 PROCEDURE — 86140 C-REACTIVE PROTEIN: CPT

## 2020-02-29 PROCEDURE — 99220 PR INITIAL OBSERVATION CARE,LEVL III: CPT | Performed by: HOSPITALIST

## 2020-02-29 PROCEDURE — 93005 ELECTROCARDIOGRAM TRACING: CPT | Performed by: EMERGENCY MEDICINE

## 2020-02-29 PROCEDURE — 700105 HCHG RX REV CODE 258: Performed by: EMERGENCY MEDICINE

## 2020-02-29 PROCEDURE — 71045 X-RAY EXAM CHEST 1 VIEW: CPT

## 2020-02-29 PROCEDURE — 85025 COMPLETE CBC W/AUTO DIFF WBC: CPT

## 2020-02-29 PROCEDURE — 70450 CT HEAD/BRAIN W/O DYE: CPT

## 2020-02-29 PROCEDURE — 85652 RBC SED RATE AUTOMATED: CPT

## 2020-02-29 PROCEDURE — 81003 URINALYSIS AUTO W/O SCOPE: CPT | Mod: XU

## 2020-02-29 PROCEDURE — 99285 EMERGENCY DEPT VISIT HI MDM: CPT

## 2020-02-29 PROCEDURE — 80053 COMPREHEN METABOLIC PANEL: CPT

## 2020-02-29 PROCEDURE — 82962 GLUCOSE BLOOD TEST: CPT

## 2020-02-29 PROCEDURE — 80307 DRUG TEST PRSMV CHEM ANLYZR: CPT

## 2020-02-29 RX ORDER — LEVOTHYROXINE SODIUM 0.2 MG/1
200 TABLET ORAL
Status: DISCONTINUED | OUTPATIENT
Start: 2020-03-01 | End: 2020-03-04 | Stop reason: HOSPADM

## 2020-02-29 RX ORDER — BUSPIRONE HYDROCHLORIDE 10 MG/1
10 TABLET ORAL 3 TIMES DAILY
Status: DISCONTINUED | OUTPATIENT
Start: 2020-03-01 | End: 2020-03-04 | Stop reason: HOSPADM

## 2020-02-29 RX ORDER — FENOFIBRATE 134 MG/1
134 CAPSULE ORAL EVERY MORNING
Status: DISCONTINUED | OUTPATIENT
Start: 2020-03-01 | End: 2020-03-04 | Stop reason: HOSPADM

## 2020-02-29 RX ORDER — ATORVASTATIN CALCIUM 80 MG/1
80 TABLET, FILM COATED ORAL EVERY EVENING
Status: DISCONTINUED | OUTPATIENT
Start: 2020-03-01 | End: 2020-03-04 | Stop reason: HOSPADM

## 2020-02-29 RX ORDER — DIVALPROEX SODIUM 500 MG/1
500 TABLET, DELAYED RELEASE ORAL EVERY MORNING
Status: DISCONTINUED | OUTPATIENT
Start: 2020-03-01 | End: 2020-03-04 | Stop reason: HOSPADM

## 2020-02-29 RX ORDER — POLYETHYLENE GLYCOL 3350 17 G/17G
1 POWDER, FOR SOLUTION ORAL
Status: DISCONTINUED | OUTPATIENT
Start: 2020-02-29 | End: 2020-03-04 | Stop reason: HOSPADM

## 2020-02-29 RX ORDER — RANITIDINE 150 MG/1
150 TABLET ORAL 2 TIMES DAILY
Status: DISCONTINUED | OUTPATIENT
Start: 2020-03-01 | End: 2020-02-29

## 2020-02-29 RX ORDER — ACETAMINOPHEN 325 MG/1
650 TABLET ORAL EVERY 6 HOURS PRN
Status: DISCONTINUED | OUTPATIENT
Start: 2020-02-29 | End: 2020-03-04 | Stop reason: HOSPADM

## 2020-02-29 RX ORDER — AMOXICILLIN 250 MG
2 CAPSULE ORAL 2 TIMES DAILY
Status: DISCONTINUED | OUTPATIENT
Start: 2020-03-01 | End: 2020-03-04 | Stop reason: HOSPADM

## 2020-02-29 RX ORDER — INSULIN GLARGINE 100 [IU]/ML
20 INJECTION, SOLUTION SUBCUTANEOUS EVERY EVENING
Status: DISCONTINUED | OUTPATIENT
Start: 2020-03-01 | End: 2020-03-04 | Stop reason: HOSPADM

## 2020-02-29 RX ORDER — DIVALPROEX SODIUM 500 MG/1
1500 TABLET, DELAYED RELEASE ORAL EVERY EVENING
Status: DISCONTINUED | OUTPATIENT
Start: 2020-03-01 | End: 2020-03-04 | Stop reason: HOSPADM

## 2020-02-29 RX ORDER — SODIUM CHLORIDE 9 MG/ML
1000 INJECTION, SOLUTION INTRAVENOUS ONCE
Status: COMPLETED | OUTPATIENT
Start: 2020-02-29 | End: 2020-02-29

## 2020-02-29 RX ORDER — LATANOPROST 50 UG/ML
1 SOLUTION/ DROPS OPHTHALMIC NIGHTLY
Status: DISCONTINUED | OUTPATIENT
Start: 2020-03-01 | End: 2020-03-04 | Stop reason: HOSPADM

## 2020-02-29 RX ORDER — PRAZOSIN HYDROCHLORIDE 2 MG/1
2 CAPSULE ORAL EVERY EVENING
Status: DISCONTINUED | OUTPATIENT
Start: 2020-03-01 | End: 2020-03-04 | Stop reason: HOSPADM

## 2020-02-29 RX ORDER — LISINOPRIL 10 MG/1
10 TABLET ORAL DAILY
Status: DISCONTINUED | OUTPATIENT
Start: 2020-03-01 | End: 2020-03-04 | Stop reason: HOSPADM

## 2020-02-29 RX ORDER — MONTELUKAST SODIUM 10 MG/1
10 TABLET ORAL DAILY
Status: DISCONTINUED | OUTPATIENT
Start: 2020-03-01 | End: 2020-03-04 | Stop reason: HOSPADM

## 2020-02-29 RX ORDER — BISACODYL 10 MG
10 SUPPOSITORY, RECTAL RECTAL
Status: DISCONTINUED | OUTPATIENT
Start: 2020-02-29 | End: 2020-03-04 | Stop reason: HOSPADM

## 2020-02-29 RX ORDER — FENOFIBRATE 130 MG/1
130 CAPSULE ORAL EVERY MORNING
Status: DISCONTINUED | OUTPATIENT
Start: 2020-03-01 | End: 2020-02-29

## 2020-02-29 RX ORDER — FAMOTIDINE 20 MG/1
20 TABLET, FILM COATED ORAL 2 TIMES DAILY
Status: DISCONTINUED | OUTPATIENT
Start: 2020-03-01 | End: 2020-03-04 | Stop reason: HOSPADM

## 2020-02-29 RX ORDER — SERTRALINE HYDROCHLORIDE 100 MG/1
100 TABLET, FILM COATED ORAL DAILY
Status: DISCONTINUED | OUTPATIENT
Start: 2020-03-01 | End: 2020-03-04 | Stop reason: HOSPADM

## 2020-02-29 RX ADMIN — SODIUM CHLORIDE 1000 ML: 9 INJECTION, SOLUTION INTRAVENOUS at 17:30

## 2020-02-29 ASSESSMENT — LIFESTYLE VARIABLES
CONSUMPTION TOTAL: INCOMPLETE
EVER FELT BAD OR GUILTY ABOUT YOUR DRINKING: NO
TOTAL SCORE: 0
HAVE YOU EVER FELT YOU SHOULD CUT DOWN ON YOUR DRINKING: NO
DOES PATIENT WANT TO STOP DRINKING: NO
EVER HAD A DRINK FIRST THING IN THE MORNING TO STEADY YOUR NERVES TO GET RID OF A HANGOVER: NO
HAVE PEOPLE ANNOYED YOU BY CRITICIZING YOUR DRINKING: NO
DO YOU DRINK ALCOHOL: NO
TOTAL SCORE: 0
TOTAL SCORE: 0

## 2020-02-29 ASSESSMENT — FIBROSIS 4 INDEX: FIB4 SCORE: 0.43

## 2020-02-29 NOTE — ED TRIAGE NOTES
"Chief Complaint   Patient presents with   • Weakness     Generalized weakness    • Visual Problems     \"My vision started going dark\" onset at 1230     /68   Pulse 97   Temp 36.6 °C (97.9 °F) (Temporal)   Resp 16   Ht 1.981 m (6' 6\")   Wt (!) 134.7 kg (297 lb)   SpO2 93%   BMI 34.32 kg/m²     38 y/o male presents to ED via EMS with complaint of generalized weakness and vision changes onset around 1230 today. Patient states he was sitting watching football when he began to feel flushed, weak and felt as though his vision was dimming. He is a diabetic and hx of glacoma as well. States he has been taking his medication as prescribed, but ran out of his diabetic test strips recently. Glucose in triage was 103.      Educated on triage process. Placed back in lobby. Advised to notify triage RN with any changes. Apologized for wait times.     "

## 2020-03-01 PROBLEM — N17.9 AKI (ACUTE KIDNEY INJURY) (HCC): Status: ACTIVE | Noted: 2020-03-01

## 2020-03-01 LAB
ALBUMIN SERPL BCP-MCNC: 4 G/DL (ref 3.2–4.9)
BUN SERPL-MCNC: 23 MG/DL (ref 8–22)
CALCIUM SERPL-MCNC: 9.1 MG/DL (ref 8.5–10.5)
CHLORIDE SERPL-SCNC: 101 MMOL/L (ref 96–112)
CO2 SERPL-SCNC: 24 MMOL/L (ref 20–33)
CREAT SERPL-MCNC: 1.11 MG/DL (ref 0.5–1.4)
GLUCOSE BLD-MCNC: 119 MG/DL (ref 65–99)
GLUCOSE BLD-MCNC: 126 MG/DL (ref 65–99)
GLUCOSE BLD-MCNC: 131 MG/DL (ref 65–99)
GLUCOSE SERPL-MCNC: 134 MG/DL (ref 65–99)
PHOSPHATE SERPL-MCNC: 2.8 MG/DL (ref 2.5–4.5)
POTASSIUM SERPL-SCNC: 4 MMOL/L (ref 3.6–5.5)
SODIUM SERPL-SCNC: 135 MMOL/L (ref 135–145)
TSH SERPL DL<=0.005 MIU/L-ACNC: 3.67 UIU/ML (ref 0.38–5.33)

## 2020-03-01 PROCEDURE — G0378 HOSPITAL OBSERVATION PER HR: HCPCS

## 2020-03-01 PROCEDURE — 36415 COLL VENOUS BLD VENIPUNCTURE: CPT

## 2020-03-01 PROCEDURE — 99226 PR SUBSEQUENT OBSERVATION CARE,LEVEL III: CPT | Performed by: INTERNAL MEDICINE

## 2020-03-01 PROCEDURE — 82962 GLUCOSE BLOOD TEST: CPT

## 2020-03-01 PROCEDURE — 80069 RENAL FUNCTION PANEL: CPT

## 2020-03-01 PROCEDURE — 99214 OFFICE O/P EST MOD 30 MIN: CPT | Mod: GC | Performed by: PSYCHIATRY & NEUROLOGY

## 2020-03-01 PROCEDURE — 700102 HCHG RX REV CODE 250 W/ 637 OVERRIDE(OP): Performed by: HOSPITALIST

## 2020-03-01 PROCEDURE — A9270 NON-COVERED ITEM OR SERVICE: HCPCS | Performed by: HOSPITALIST

## 2020-03-01 RX ADMIN — DIVALPROEX SODIUM 500 MG: 500 TABLET, DELAYED RELEASE ORAL at 06:40

## 2020-03-01 RX ADMIN — FAMOTIDINE 20 MG: 20 TABLET ORAL at 06:40

## 2020-03-01 RX ADMIN — ATORVASTATIN CALCIUM 80 MG: 80 TABLET, FILM COATED ORAL at 16:27

## 2020-03-01 RX ADMIN — BUSPIRONE HYDROCHLORIDE 10 MG: 10 TABLET ORAL at 16:27

## 2020-03-01 RX ADMIN — LEVOTHYROXINE SODIUM 200 MCG: 200 TABLET ORAL at 06:39

## 2020-03-01 RX ADMIN — ASPIRIN 81 MG: 81 TABLET, COATED ORAL at 06:39

## 2020-03-01 RX ADMIN — PRAZOSIN HYDROCHLORIDE 2 MG: 2 CAPSULE ORAL at 16:28

## 2020-03-01 RX ADMIN — MONTELUKAST 10 MG: 10 TABLET, FILM COATED ORAL at 06:40

## 2020-03-01 RX ADMIN — FAMOTIDINE 20 MG: 20 TABLET ORAL at 16:27

## 2020-03-01 RX ADMIN — SERTRALINE HYDROCHLORIDE 100 MG: 100 TABLET ORAL at 06:40

## 2020-03-01 RX ADMIN — METFORMIN HYDROCHLORIDE 1000 MG: 500 TABLET ORAL at 16:27

## 2020-03-01 RX ADMIN — LATANOPROST 1 DROP: 50 SOLUTION OPHTHALMIC at 22:22

## 2020-03-01 RX ADMIN — DIVALPROEX SODIUM 1500 MG: 500 TABLET, DELAYED RELEASE ORAL at 03:04

## 2020-03-01 RX ADMIN — BUSPIRONE HYDROCHLORIDE 10 MG: 10 TABLET ORAL at 12:18

## 2020-03-01 RX ADMIN — INSULIN GLARGINE 20 UNITS: 100 INJECTION, SOLUTION SUBCUTANEOUS at 16:28

## 2020-03-01 RX ADMIN — FENOFIBRATE 134 MG: 134 CAPSULE ORAL at 06:39

## 2020-03-01 RX ADMIN — BUSPIRONE HYDROCHLORIDE 10 MG: 10 TABLET ORAL at 06:40

## 2020-03-01 RX ADMIN — METFORMIN HYDROCHLORIDE 1000 MG: 500 TABLET ORAL at 08:29

## 2020-03-01 RX ADMIN — DIVALPROEX SODIUM 1500 MG: 500 TABLET, DELAYED RELEASE ORAL at 16:27

## 2020-03-01 RX ADMIN — LISINOPRIL 10 MG: 10 TABLET ORAL at 06:40

## 2020-03-01 RX ADMIN — INSULIN GLARGINE 20 UNITS: 100 INJECTION, SOLUTION SUBCUTANEOUS at 03:15

## 2020-03-01 ASSESSMENT — ENCOUNTER SYMPTOMS
MYALGIAS: 0
HEADACHES: 0
NAUSEA: 0
DIZZINESS: 1
WEAKNESS: 1
DIARRHEA: 0
FEVER: 0
FOCAL WEAKNESS: 0
DEPRESSION: 0
VOMITING: 0
ABDOMINAL PAIN: 0
WHEEZING: 0
TINGLING: 0
SENSORY CHANGE: 1
PALPITATIONS: 0
PHOTOPHOBIA: 0
SORE THROAT: 0
COUGH: 0
SHORTNESS OF BREATH: 0
CHILLS: 0

## 2020-03-01 ASSESSMENT — COGNITIVE AND FUNCTIONAL STATUS - GENERAL
DRESSING REGULAR UPPER BODY CLOTHING: A LITTLE
TOILETING: A LOT
HELP NEEDED FOR BATHING: A LOT
SUGGESTED CMS G CODE MODIFIER DAILY ACTIVITY: CK
STANDING UP FROM CHAIR USING ARMS: TOTAL
SUGGESTED CMS G CODE MODIFIER MOBILITY: CL
WALKING IN HOSPITAL ROOM: TOTAL
DAILY ACTIVITIY SCORE: 17
CLIMB 3 TO 5 STEPS WITH RAILING: TOTAL
PERSONAL GROOMING: A LITTLE
MOVING FROM LYING ON BACK TO SITTING ON SIDE OF FLAT BED: A LOT
MOVING TO AND FROM BED TO CHAIR: A LOT
DRESSING REGULAR LOWER BODY CLOTHING: A LITTLE
MOBILITY SCORE: 11

## 2020-03-01 ASSESSMENT — FIBROSIS 4 INDEX: FIB4 SCORE: 0.43

## 2020-03-01 ASSESSMENT — LIFESTYLE VARIABLES: EVER_SMOKED: YES

## 2020-03-01 NOTE — ED NOTES
Received report from [facility]. Upon shift change pt is resting in bed with even and unlabored breaths, in no apparent distress. Will continue to monitor.

## 2020-03-01 NOTE — PROGRESS NOTES
"Hospital Medicine Daily Progress Note    Date of Service  3/1/2020    Chief Complaint  39 y.o. male admitted 2/29/2020 with Weakness (Generalized weakness ) and Visual Problems (\"My vision started going dark\" onset at 1230)    Hospital Course    Hospitalized last December 2019 for R hemiparesis. At that hospitalization, he was not a candidate for TPA.  CT scan of the head, CTA of the head and neck were negative.  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  Work-up including MELAS and CADASIL were obtained. Patient was also treated for UTI. Throughout his hospital course, he continued to complain of right-sided weakness and paralysis although his reflexes remained intact and his physical exam was not consistent with his reported symptoms. There was concern of possible psychogenic element to his complaints.   Last Neurology assessment on 12/27:  \"39-year old male with PMhx significant for anxiety/depression, Bipolar 1 disorder, severe hypothyroid, baseline developmental delay/intellectual disorder, seizure disorder (epileptic vs psychogenic; on VPA), and diabetes mellitus who presented to [facility] on 12/27/19 for a chief complaint of Right sided weakness; not a candidate for IV tPA given presentation time greater than 4.5 hours from time of symptom onset. 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 In the setting of negative MRI Brain, initial differential dx include mitochondrial encephalomyelopathy with lactic acidosis and stroke like episodes (MELAS), though will note that this underlying, chronic disease process is best worked up and treated as outpatient (once acute ischemia has been ruled out).  Despite this, there remains little explanation for patient's persistent RUE/RLE weakness/hemiparesis and hemisensory loss. Note near normal " "TSH; VPA level WNL; Ammonia WNL; have suspicion for at least partial involvement of functional/psychosomatic etiology of symptoms (also given his psychiatric history). For this would recommend to consider psychiatric consultation (inpatient or outpatient). Patient may continue secondary stroke prevention; ASA 81 mg PO q day and Atorvastatin 80 mg PO q HS (note TG > 500, LDL incalculable; this finding may also be consistent with underlying genetic disorder); continue Lantus and PO antihyperglycemics for diabetes mellitus. Provide counseling regarding life style and risk factor modification for primary stroke prevention.  PT/OT and Case management on board for dispo; additionally, will coordinate outpatient neurology appointment for the patient for further work up of the above.\" Trinity Corado, Dr. Soto  He was subsequently discharged to skilled nursing and neurology I recommend continued follow-up for possible MELAS on an outpatient basis. 3d prior to admission he was seen in our emergency room for agitation and aggressiveness.  He was diagnosed with situational anxiety and sent back to his assisted living facility.   Presents with Weakness (Generalized weakness ) and Visual Problems (\"My vision started going dark\" onset at 1230)  Basically one day history of weakness and visual complaints.  At the ED, afebrile and hemodynamically stable.   CT head:  No acute intracranial abnormality is identified.  Stable severe white matter disease  CXR:  1. No acute cardiopulmonary abnormalities are identified.  Not hypoglycemic.        Interval Problem Update  Somewhat withdrawn, generalized weakness, cognitive deficits  Spoke with Dr. Soto, neurology who recommend Psychiatry.    Consultants/Specialty  Neurology  Psychiatry    Code Status  full    Disposition  PT/OT ordered    Review of Systems  Review of Systems   Unable to perform ROS: Mental acuity   All other systems reviewed and are negative.       Physical Exam  Temp:  " [36.1 °C (97 °F)-36.6 °C (97.9 °F)] 36.1 °C (97 °F)  Pulse:  [54-97] 56  Resp:  [15-20] 20  BP: ()/(44-71) 96/58  SpO2:  [91 %-98 %] 96 %    Physical Exam  Vitals signs and nursing note reviewed.   HENT:      Head: Normocephalic and atraumatic.      Right Ear: External ear normal.      Left Ear: External ear normal.      Nose: Nose normal.      Mouth/Throat:      Mouth: Mucous membranes are moist.   Eyes:      General: No scleral icterus.     Conjunctiva/sclera: Conjunctivae normal.   Neck:      Musculoskeletal: Normal range of motion and neck supple.   Cardiovascular:      Rate and Rhythm: Normal rate and regular rhythm.      Heart sounds: No murmur. No friction rub. No gallop.    Pulmonary:      Effort: Pulmonary effort is normal.      Breath sounds: Normal breath sounds.   Abdominal:      General: Abdomen is flat. Bowel sounds are normal. There is no distension.      Palpations: Abdomen is soft.      Tenderness: There is no abdominal tenderness. There is no guarding.   Musculoskeletal: Normal range of motion.   Skin:     General: Skin is warm.   Neurological:      Mental Status: He is alert and oriented to person, place, and time. Mental status is at baseline.      Motor: Weakness (generalized) present.      Comments: Cogntive deficits   Psychiatric:         Mood and Affect: Mood normal.         Behavior: Behavior normal.         Thought Content: Thought content normal.         Judgment: Judgment normal.      Comments: Flat affect  Somewhat withdrawn         Fluids    Intake/Output Summary (Last 24 hours) at 3/1/2020 0934  Last data filed at 3/1/2020 0300  Gross per 24 hour   Intake 1900 ml   Output --   Net 1900 ml       Laboratory  Recent Labs     02/29/20  1708   WBC 10.3   RBC 4.96   HEMOGLOBIN 14.1   HEMATOCRIT 44.3   MCV 89.3   MCH 28.4   MCHC 31.8*   RDW 46.2   PLATELETCT 414   MPV 9.5     Recent Labs     02/29/20  1708   SODIUM 135   POTASSIUM 4.4   CHLORIDE 100   CO2 22   GLUCOSE 104*   BUN 26*  "  CREATININE 1.44*   CALCIUM 9.3                   Imaging  CT-HEAD W/O   Final Result      No acute intracranial abnormality is identified.      Stable severe white matter disease      DX-CHEST-PORTABLE (1 VIEW)   Final Result         1. No acute cardiopulmonary abnormalities are identified.           Assessment/Plan  * Weakness- (present on admission)  Assessment & Plan  \"This is a global weakness reported by the patient in addition to right-sided vision changes.  It is difficult to appreciate any weakness on bedside exam and certainly there are no lateralizing symptoms.  Query if this is related to his recent diagnosis of leukodystrophy versus psychogenic.  No seizure activity noted thus far.  I will request a PT and OT consultation for assessment of his weakness.  In light of his recent thorough neurologic work-up, and lack of focal symptoms, I will not repeat MRI of the brain at this point.  Pending PT and OT consultation, we may consider an neurologic consult although they did note under last hospitalization that he should be worked up on an outpatient basis.  The patient may also benefit from a psychiatric eval.\"  Neurology outpatient MELAS, referral being made  Neurology recommend Psychiatry eval for psychogenic causes  Consulted Psychiatry    ANA (acute kidney injury) (HCC)  Assessment & Plan  \"This is mild, IV fluids overnight and repeat chemistries in the morning.\"  Ordered these chemistries myself.    HTN (hypertension)  Assessment & Plan  Patient normotensive, resume home Minipress, Prinivil.    HLD (hyperlipidemia)- (present on admission)  Assessment & Plan  This is chronic, resume home fenofibrate and aspirin.    Diabetes mellitus type 2 in obese (HCC)- (present on admission)  Assessment & Plan  Resume home Lantus, metformin, monitor with Accu-Cheks and cover with insulin sliding scale.    History of seizure- (present on admission)  Assessment & Plan  No seizure activity noted since admission to the " hospital.  Continue home Depakote.    Psychiatric problem- (present on admission)  Assessment & Plan  Resume home Zoloft and BuSpar    Acquired hypothyroidism- (present on admission)  Assessment & Plan  History of poor control, continue home Synthroid and check TSH.       VTE prophylaxis: SCDs  Gastrointestinal prophylaxis: None  Antibiotics:   Ordered Anti-infectives (9999h ago, onward)    None        Diet:   Orders Placed This Encounter   Procedures   • Diet Order Diabetic     Standing Status:   Standing     Number of Occurrences:   1     Order Specific Question:   Diet:     Answer:   Diabetic [3]      Prognosis: Guarded  Risk: The Patient is at HIGH risk for inpatient complications and decompensation secondary to his multiple cormorbidities including   Problem   Tomy (Acute Kidney Injury) (Hcc)   Weakness   Diabetes Mellitus Type 2 in Obese (Hcc)   History of Seizure      I have personally reviewed notes, labs, vitals, imaging.  I discussed the plan of care with bedside RN as well as on multidisciplinary rounds  I have performed a physical exam and reviewed and updated ROS and Plan today 3/1/2020. In review of yesterday's note 2/29/2020   there are no changes except as documented above.

## 2020-03-01 NOTE — PROGRESS NOTES
2 RN admission skin check done with OMID Tay.     -Heels calloused, dry, flaky, intact.   -Slight pink, blanching to coccyx/sacrum.   -No other areas of note.   -Pt turns self in bed, silicone oxygen tubing in place.

## 2020-03-01 NOTE — ED NOTES
Called [facility] to let RN know that pt is ready to be picked up. OMID Garrido, states that she will be down to  pt as soon as she can. Will continue to monitor pt while awaiting RN arrival for transport. Pt currently sleeping in bed with even and unlabored breaths, in no apparent distress.

## 2020-03-01 NOTE — PROGRESS NOTES
Monitor summary: SB/SR 59-65, NE 0.16, QRS 0.10, QT 0.40 with no ectopy per strip from monitor room.

## 2020-03-01 NOTE — ED NOTES
Pt resting in bed with eyes closed, no distress is noted, breaths are even and unlabored. Will continue to monitor.

## 2020-03-01 NOTE — H&P
"Hospital Medicine History & Physical Note    Date of Service  2/29/2020    Primary Care Physician  ANIKET Davis    Consultants  None    Code Status  Full    Chief Complaint  Chief Complaint   Patient presents with   • Weakness     Generalized weakness    • Visual Problems     \"My vision started going dark\" onset at 1230       History of Presenting Illness  39 y.o. male who presented on 2/29/2020 with global weakness and diminished vision on the right side.  This is a 39-year-old gentleman who was admitted in December for right-sided weakness and hemiparesis.  He was followed by neurology during his hospitalization and was not a candidate for TPA.  CT scan of the head, CTA of the head and neck were negative.  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  Work-up including MELAS and CADASIL were obtained.  Patient was also treated for UTI during his hospitalization.  Throughout his hospital course, he continued to complain of right-sided weakness and paralysis although his reflexes remained intact and his physical exam was not consistent with his reported symptoms.  There was concern of possible psychogenic element to his complaints.  He was subsequently discharged to skilled nursing and neurology I recommend continued follow-up for possible MELAS on an outpatient basis.  3 days ago he was seen in our emergency room for agitation and aggressiveness.  He was diagnosed with situational anxiety and sent back to his assisted living facility.  He comes in today with 1 day of generalized and global weakness while he was at rest.  This is associated with episodes of dizziness and reports of diminished right-sided vision.  He states that it looks like the room is getting dark on the right side.  He states that he feels paralyzed like his prior hospitalization although this time its on both left and right sides.  CT scan in the emergency room was negative.  However, " patient is unable to ambulate on his own because of his symptoms.  He otherwise denies any fevers, chills, headache, chest pain or shortness of breath.  He has had no diarrhea or dysuria.    Review of Systems  Review of Systems   Constitutional: Negative for chills and fever.   HENT: Negative for congestion and sore throat.    Eyes: Negative for photophobia.   Respiratory: Negative for cough, shortness of breath and wheezing.    Cardiovascular: Negative for chest pain and palpitations.   Gastrointestinal: Negative for abdominal pain, diarrhea, nausea and vomiting.   Genitourinary: Negative for dysuria.   Musculoskeletal: Negative for myalgias.   Skin: Negative.    Neurological: Positive for dizziness, sensory change and weakness. Negative for tingling, focal weakness and headaches.   Psychiatric/Behavioral: Negative for depression and suicidal ideas.       Past Medical History  Past Medical History:   Diagnosis Date   • Seizure ([facility]) 2010   • Psychiatric problem 2002    PTSD   • Glaucoma 1982    both eyes/ blind on left eye   • Anxiety     BIPOLAR   • ASTHMA    • Bipolar 1 disorder ([facility])    • Depression    • Diabetes ([facility])     Type II Diabetes   • Fall     passed out 2 wks ago   • Glaucoma    • Hypothyroidism    • Indigestion     once in a while   • Mental disorder     learning disabilities; speech impairment; developmental delays   • Murmur     since birth   • Pneumonia     remote   • S/P thyroidectomy    • Seizure disorder ([facility])    • Unspecified disorder of thyroid        Surgical History  Past Surgical History:   Procedure Laterality Date   • EYE SURGERY     • OTHER      Hernia Repair when he was 10 yrs old   • THYROID LOBECTOMY         Family History  Family History   Problem Relation Age of Onset   • Hypertension Mother    • Heart Disease Mother    • Lung Disease Mother    • Stroke Maternal Grandmother        Social History  Social History     Tobacco Use   • Smoking status: Current Every Day Smoker      "Packs/day: 0.25     Types: Cigarettes, Cigars     Last attempt to quit: 2018     Years since quittin.8   • Smokeless tobacco: Never Used   Substance Use Topics   • Alcohol use: Yes     Frequency: 4 or more times a week     Drinks per session: 3 or 4     Binge frequency: Daily or almost daily   • Drug use: Yes     Types: Inhaled     Comment: marijuana       Allergies  Allergies   Allergen Reactions   • Abilify Unspecified     \"Feeling tired, like I don't even know whats going on around me\"   • Fish      Pt reports fish causes him to be sick to his stomach         Medications  No current facility-administered medications on file prior to encounter.      Current Outpatient Medications on File Prior to Encounter   Medication Sig Dispense Refill   • Cholecalciferol (VITAMIN D) 2000 UNIT Tab Take 2,000 Units by mouth every day.     • sertraline (ZOLOFT) 100 MG Tab Take 100 mg by mouth every day.     • latanoprost (XALATAN) 0.005 % Solution Place 1 Drop in both eyes every evening.     • travoprost (TRAVATAN Z) 0.004 % Solution Place 1 Drop in both eyes every evening.     • fenofibrate (ANTARA) 130 MG capsule Take 130 mg by mouth every morning.     • Empagliflozin (JARDIANCE) 25 MG Tab Take 25 mg by mouth every day.     • lisinopril (PRINIVIL) 10 MG Tab Take 10 mg by mouth every day.     • montelukast (SINGULAIR) 10 MG Tab Take 10 mg by mouth every day.     • aspirin EC (ECOTRIN) 81 MG Tablet Delayed Response Take 81 mg by mouth every day.     • atorvastatin (LIPITOR) 80 MG tablet Take 80 mg by mouth every evening.     • insulin glargine (BASAGLAR KWIKPEN) 100 UNIT/ML Solution Pen-injector injection Inject 20 Units as instructed every evening.     • busPIRone (BUSPAR) 10 MG Tab tablet Take 10 mg by mouth 3 times a day.     • divalproex (DEPAKOTE) 500 MG Tablet Delayed Response Take 500-1,500 mg by mouth 2 Times a Day. 500mg in AM  1500mg at PM     • glimepiride (AMARYL) 4 MG Tab Take 4 mg by mouth every morning.   "   • levothyroxine (SYNTHROID) 200 MCG Tab Take 200 mcg by mouth Every morning on an empty stomach.     • metformin (GLUCOPHAGE) 1000 MG tablet Take 1,000 mg by mouth 2 times a day.     • prazosin (MINIPRESS) 2 MG Cap Take 2 mg by mouth every evening.     • raNITidine (ZANTAC) 150 MG Tab Take 150 mg by mouth 2 times a day.         Physical Exam  Hemodynamics  Temp (24hrs), Av.5 °C (97.7 °F), Min:36.4 °C (97.5 °F), Max:36.6 °C (97.9 °F)   Temperature: 36.4 °C (97.5 °F)  Pulse  Av.7  Min: 62  Max: 97    Blood Pressure: 107/55      Respiratory      Respiration: 17, Pulse Oximetry: 97 %             Physical Exam   Constitutional: He is oriented to person, place, and time. No distress.   HENT:   Head: Normocephalic and atraumatic.   Right Ear: External ear normal.   Left Ear: External ear normal.   Eyes: EOM are normal. Right eye exhibits no discharge. Left eye exhibits no discharge.   Neck: Neck supple. No JVD present.   Cardiovascular: Normal rate, regular rhythm and normal heart sounds.   Pulmonary/Chest: Effort normal and breath sounds normal. No respiratory distress. He exhibits no tenderness.   Abdominal: Soft. Bowel sounds are normal. He exhibits no distension. There is no abdominal tenderness.   Musculoskeletal:         General: No edema.   Neurological: He is alert and oriented to person, place, and time. No cranial nerve deficit.   Skin: Skin is dry. He is not diaphoretic. No erythema.   Psychiatric: He has a normal mood and affect. His behavior is normal.   Nursing note and vitals reviewed.    Capillary refill less than 3 seconds, distal pulses intact    Laboratory:  Recent Labs     20  1708   WBC 10.3   RBC 4.96   HEMOGLOBIN 14.1   HEMATOCRIT 44.3   MCV 89.3   MCH 28.4   MCHC 31.8*   RDW 46.2   PLATELETCT 414   MPV 9.5     Recent Labs     20  1708   SODIUM 135   POTASSIUM 4.4   CHLORIDE 100   CO2 22   GLUCOSE 104*   BUN 26*   CREATININE 1.44*   CALCIUM 9.3     Recent Labs      02/29/20  1708   ALTSGPT 11   ASTSGOT 16   ALKPHOSPHAT 44   TBILIRUBIN 0.7   GLUCOSE 104*                 Lab Results   Component Value Date    TROPONINI <0.01 03/24/2019       Imaging  Ct-head W/o    Result Date: 2/29/2020 2/29/2020 6:45 PM HISTORY/REASON FOR EXAM:  Dizziness, non-specific; weakness and vision changes onset at 12:30. Diabetes TECHNIQUE/EXAM DESCRIPTION AND NUMBER OF VIEWS:    CT of the head without contrast. Contiguous 5 mm axial sections were obtained from the skull base through the vertex. Up to date radiation dose reduction adjustments have been utilized to meet ALARA standards for radiation dose reduction. COMPARISON:   12/27/2019. FINDINGS: No acute intracranial hemorrhage is seen. There is no midline shift. Ventricles are normal in size and configuration. Gray white junction differentiation is distinct. There is stable diffuse white matter hypodensity Visualized paranasal sinuses and mastoid air cells are clear. No acute calvarium abnormality is noted.     No acute intracranial abnormality is identified. Stable severe white matter disease    Dx-chest-portable (1 View)    Result Date: 2/29/2020 2/29/2020 5:16 PM HISTORY/REASON FOR EXAM:  Shortness of Breath Chest pain TECHNIQUE/EXAM DESCRIPTION AND NUMBER OF VIEWS: Single portable view of the chest. COMPARISON: 12/27/2019 FINDINGS: Low lung volume. No pulmonary infiltrates or consolidations are noted. No pleural effusion. No pneumothorax. Stable cardiopericardial silhouette.     1. No acute cardiopulmonary abnormalities are identified.        Assessment/Plan:  Anticipate that patient will need less than 2 midnights for management of the discussed medical issues.    * Weakness- (present on admission)  Assessment & Plan  This is a global weakness reported by the patient in addition to right-sided vision changes.  It is difficult to appreciate any weakness on bedside exam and certainly there are no lateralizing symptoms.  Query if this is  related to his recent diagnosis of leukodystrophy versus psychogenic.  No seizure activity noted thus far.  I will request a PT and OT consultation for assessment of his weakness.  In light of his recent thorough neurologic work-up, and lack of focal symptoms, I will not repeat MRI of the brain at this point.  Pending PT and OT consultation, we may consider an neurologic consult although they did note under last hospitalization that he should be worked up on an outpatient basis.  The patient may also benefit from a psychiatric eval.    ANA (acute kidney injury) (HCC)  Assessment & Plan  This is mild, IV fluids overnight and repeat chemistries in the morning.    HTN (hypertension)  Assessment & Plan  Patient normotensive, resume home Minipress, Prinivil.    Diabetes mellitus type 2 in obese (HCC)- (present on admission)  Assessment & Plan  Resume home Lantus, metformin, monitor with Accu-Cheks and cover with insulin sliding scale.    HLD (hyperlipidemia)- (present on admission)  Assessment & Plan  This is chronic, resume home fenofibrate and aspirin.    History of seizure- (present on admission)  Assessment & Plan  No seizure activity noted since admission to the hospital.  Continue home Depakote.    Psychiatric problem- (present on admission)  Assessment & Plan  Resume home Zoloft and BuSpar    Acquired hypothyroidism- (present on admission)  Assessment & Plan  History of poor control, continue home Synthroid and check TSH.      Prophylaxis: Sequential compression devices for DVT prophylaxis, no PPI indicated, bowel protocol as needed

## 2020-03-01 NOTE — ED NOTES
Bedside report given to OMID Garrido. Pt being taken upstairs at this time by RN via leopoldo, on monitor and O2. Pt is awake and alert, talking to staff, in no apparent distress at time of transfer. Pt's paperwork and belongings sent upstairs with pt and RN.

## 2020-03-01 NOTE — ED NOTES
Pt given water, per ERP. Pt tolerated well. No distress noted at this time. Will continue to monitor pt.

## 2020-03-01 NOTE — ED PROVIDER NOTES
"ED Provider Note    CHIEF COMPLAINT  Chief Complaint   Patient presents with   • Weakness     Generalized weakness    • Visual Problems     \"My vision started going dark\" onset at 1230       HPI  Norm Prabhakar is a 39 y.o. 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  The patient says he was watching television, everything started to get black in his vision, he was feeling very lightheaded and dizzy and felt like he is going to pass out.  He then became very weak feeling and has been having difficulty moving his arms and legs since.  The patient denies any significant headache, chest pain, back pain, abdominal pain.  He has had no recent fever, sore throat, cough, congestion, difficulty breathing, vomiting, or diarrhea.  The patient was admitted to the hospital 2 months ago with sudden onset of right-sided weakness.  He had a full stroke evaluation with a negative work-up other than the MRI showing findings consistent with chronic leukodystrophy.    REVIEW OF SYSTEMS  See HPI for further details. All other systems are negative.     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   • Hypothyroidism    • Indigestion     once in a while   • Mental disorder     learning disabilities; speech impairment; developmental delays   • Murmur     since birth   • Pneumonia     remote   • Psychiatric problem 2002    PTSD   • S/P thyroidectomy    • Seizure ([facility]) 2010   • Seizure disorder ([facility])    • Unspecified disorder of thyroid  "       FAMILY HISTORY  Family History   Problem Relation Age of Onset   • Hypertension Mother    • Heart Disease Mother    • Lung Disease Mother    • Stroke Maternal Grandmother        SOCIAL HISTORY  Social History     Socioeconomic History   • Marital status: Single     Spouse name: Not on file   • Number of children: Not on file   • Years of education: Not on file   • Highest education level: Not on file   Occupational History   • Not on file   Social Needs   • Financial resource strain: Not on file   • Food insecurity     Worry: Not on file     Inability: Not on file   • Transportation needs     Medical: Not on file     Non-medical: Not on file   Tobacco Use   • Smoking status: Current Every Day Smoker     Packs/day: 0.25     Types: Cigarettes, Cigars     Last attempt to quit: 2018     Years since quittin.8   • Smokeless tobacco: Never Used   Substance and Sexual Activity   • Alcohol use: Yes     Frequency: 4 or more times a week     Drinks per session: 3 or 4     Binge frequency: Daily or almost daily   • Drug use: Yes     Types: Inhaled     Comment: marijuana   • Sexual activity: Not on file   Lifestyle   • Physical activity     Days per week: Not on file     Minutes per session: Not on file   • Stress: Not on file   Relationships   • Social connections     Talks on phone: Not on file     Gets together: Not on file     Attends Jainism service: Not on file     Active member of club or organization: Not on file     Attends meetings of clubs or organizations: Not on file     Relationship status: Not on file   • Intimate partner violence     Fear of current or ex partner: Not on file     Emotionally abused: Not on file     Physically abused: Not on file     Forced sexual activity: Not on file   Other Topics Concern   • Not on file   Social History Narrative   • Not on file       SURGICAL HISTORY  Past Surgical History:   Procedure Laterality Date   • EYE SURGERY     • OTHER      Hernia Repair when he was  "10 yrs old   • THYROID LOBECTOMY         CURRENT MEDICATIONS  Home Medications    **Home medications have not yet been reviewed for this encounter**         ALLERGIES  Allergies   Allergen Reactions   • Abilify Unspecified     \"Feeling tired, like I don't even know whats going on around me\"   • Fish      Pt reports fish causes him to be sick to his stomach         PHYSICAL EXAM  VITAL SIGNS: Blood Pressure 113/62   Pulse 88   Temperature 36.4 °C (97.5 °F) (Temporal)   Respiration 18   Height 1.981 m (6' 6\")   Weight (Abnormal) 134.7 kg (297 lb)   Oxygen Saturation 96%   Body Mass Index 34.32 kg/m²   Constitutional: Morbidly obese male, awake, alert, in no acute distress, Non-toxic appearance.   HENT: Atraumatic. Bilateral external ears normal, mucous membranes moist, throat nonerythematous without exudates, nose is normal.  Eyes: PERRL on the right, left pupil is 3 mm, nonreactive, with what appears to be a previous iridectomy, with total loss of vision, EOMI, conjunctiva moist, noninjected.  Neck: Nontender, Normal range of motion, No nuchal rigidity, No stridor.   Lymphatic: No lymphadenopathy noted.   Cardiovascular: Regular rate and rhythm, no murmurs, rubs, gallops.  Thorax & Lungs:  Good breath sounds bilaterally, no wheezes, rales, or retractions.  No chest tenderness.  Abdomen: Bowel sounds normal, Soft, nontender, nondistended, no rebound, guarding, masses.  Back: No CVA or spinal tenderness.  Extremities: Intact distal pulses, No edema, No tenderness.   Skin: Warm, Dry, No rashes.   Musculoskeletal: No joint swelling or tenderness.  Neurologic: Alert & oriented x 3, cranial nerves II through XII shows loss of vision in the left eye which is chronic, no facial asymmetry, speech is somewhat slowed but fluent, sensory intact light touch, and motor shows generalized weakness with 3/5 strength to the upper and lower extremities, patient is unable to do straight leg raises against gravity, or lift his arms " above his chest.  Psychiatric: Affect somewhat flattened, judgment normal.    EKG  Twelve-lead EKG shows a normal sinus rhythm, rate of 77, normal intervals, normal axis, QRS widened at 112 ms, no acute ST wave elevations or ST depressions, no pathologic Q waves, inverted T waves in lead III, no evidence of an acute injury or ischemic pattern on my reading, in comparison to a previous EKG from December 2019 there are no acute changes.    RADIOLOGY/PROCEDURES  CT-HEAD W/O   Final Result      No acute intracranial abnormality is identified.      Stable severe white matter disease      DX-CHEST-PORTABLE (1 VIEW)   Final Result         1. No acute cardiopulmonary abnormalities are identified.            COURSE & MEDICAL DECISION MAKING  Pertinent Labs & Imaging studies reviewed. (See chart for details)  The patient presents with the above complaints.  On presentation he complains of full body weakness, and required significant assistance to get onto the gurney.  He does not appear to have any symptoms consistent with an acute CVA.  Clinically he appears dehydrated with dry mucous membranes.  IV was placed, he was given a bolus normal saline.  CT scan of the head without contrast shows no acute intracranial findings.  Chest ray shows no acute cardiopulmonary abnormalities.  CBC shows white count 10,300, hemoglobin 14.1, normal differential, chemistry shows a CO2 22, anion gap 13, BUN 26, creatinine 1.44, glucose 104, liver function test normal, troponin 19, C-reactive protein 0.13, sedimentation rate 5, urine drug screen was negative.  Urinalysis shows greater than 1000 glucose, 15 ketones, otherwise negative.    The patient does not appear to have any symptoms consistent with an acute CVA, and is also outside the window for TPA.  He has total body weakness.  He has no significant electrolyte abnormalities, but does have a mild increase in his creatinine.  He is given a liter bolus of normal saline as he was  intermittently borderline hypotensive.  He has no obvious infectious source.  White count is normal, inflammatory markers are normal.  On recheck the patient continued to be unable to ambulate, and shows diffuse symmetrical weakness to the upper and lower extremities.  Given his weakness and inability to ambulate he will be admitted.  Case was discussed with Dr. Ward.    FINAL IMPRESSION  1.  Weakness  2.  Unable to ambulate  3.         Electronically signed by: Rashid Casey M.D., 2/29/2020 5:15 PM

## 2020-03-01 NOTE — ED NOTES
Pt cleaned of diarrhea incontinence. Linens changed and pt placed in clean gown. Pt's sweat pants thrown away at his request as they were covered in stool. Pt updated regarding status waiting for bed assignment in hospital, demonstrated understanding. Will continue to monitor.

## 2020-03-01 NOTE — ED NOTES
Pt resting in bed with even and unlabored breaths. No distress noted. Pt eating second box lunch provided, tolerating well. Will continue to monitor.

## 2020-03-01 NOTE — ASSESSMENT & PLAN NOTE
-A1c 7.8 December 2019  -Continue home metformin  -Continue Lantus  -Accu-Cheks AC at bedtime with SSI -105-179 over the past 24 hours  -Diabetic diet  -Hypoglycemia protocol

## 2020-03-01 NOTE — CONSULTS
"PSYCHIATRIC CONSULTATION:  Reason for admission: Generalized weakness, vision changes  Requesting Physician: Misael Rivera M.D.  Supervising Physician: Dr. Pina    Legal status:  not applicable    Chief Complaint: Generalized weakness, vision changes    HPI: Norm Prabhakar is a 39 y.o. year old male with a PMH of bipolar 1, functional neurological disorder, depression and seizure disorder who presented to [facility] 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    Patient reports he was eating lunch yesterday when his \"vision started to go dark\", he felt hot flashes and then experienced generalized weakness that was worse in his right side.  He notified his group home staff and was brought to the hospital.  He continues to present with the symptoms as well as moderate dysarthria and denies improvement.  Patient denies loss of bladder and bowels.  Patient did have an episode of fecal incontinence while in the ED.  Patient denies being aware of experiencing a seizure yesterday and reports his last known seizure was approximately 4 to 5 months ago.  He has had seizures in the past that did not have accompanying weakness.    Patient reports in the past recent weeks he has been having transient losses of vision and seeing spots.  He also reports associated dizzy spells.  Patient has been placed on oxygen due to desaturation while inpatient.    Patient is denying significant psychosocial stressors.  He reports he recently was approved for disability and has a stable relationship with his long-distance girlfriend.  He is hoping to move to [place] shortly to be with her.  Patient was seen on 2/26 for agitation and anxiety secondary to concerns over making rent.  He reports those feelings resolved and he thinks he will be okay.  He denies significant " "symptoms of depression including suicidal ideation.  Also denies anxiety and panic symptoms.  He has not experienced an episode of carolyn.    Patient reports he is medication compliant and denies recent medication changes.  He [facility] Barragan [facility] and reports a good rapport.    Psychiatric Review of Systems: current symptoms as reported by pt.  Depression: denies depressed mood, anhedonia, amotivation, changes in weight, insomnia, psychomotor agitation/retardation, decreased energy, feelings of worthlessness/guilt, difficulty eating, or SI.      Carolyn: denies irritability, decreased need for sleep, increased energy, talkativeness, grandiose thoughts or behaviors, racing thoughts, or increased goal-directed behavior.   Anxiety/Panic Attacks: denies feelings of anxiety, feeling 'keyed up', excessive worrying, shortness of breath, feelings of impending doom or death, sweating, trembling, shaking, chest pain, chills, dizziness, or racing heart .  PTSD symptom: Reports trauma symptoms of intrusive memories, nightmares and external reminders are at baseline and not impactful at this time  Psychosis: denies perceptual disturbances, ideas of reference, thought insertion/broadcasting, paranoia, or delusions.   Other:       Medical Review of Systems: as reported by pt. Only those found to be + are noted below. All others are negative.     Neurologic: Generalized weakness, vision changes      Psychiatric Examination: observed phenomenon:  Vitals: /67   Pulse 65   Temp 36.1 °C (96.9 °F) (Temporal)   Resp 18   Ht 1.981 m (6' 6\")   Wt (!) 127.2 kg (280 lb 6.8 oz)   SpO2 93%   BMI 32.41 kg/m²  Body mass index is 32.41 kg/m².  Musculoskeletal: No spontaneous movement below the neck, slight movements of both feet left greater than right, able to lift up left arm, minimal flexion of bilateral hands, negative Romero sign, unable to maintain limbs and a position, smooth drop when " limbs are released  General: 39 y.o. y M who appears stated age, moderately groomed in hospital attire, pleasant and cooperative with exam, appropriate eye contact, possible exophthalmos of left eye which is bloodshot, lower jaw protruding  Mood: “ okay ”  Affect: Constricted bordering on flat, minimal emotional expressivity  SI/HI: Denies SI and HI  Thought Process: Linear, organized, goal-directed   Thought Content: Denies AH/VH, no evidence of internal stimuli, no delusional content noted during exam   Associations: No loose associations   Speech: Conversational in nature with a reduced rate, normal rhythm, tone, and volume; disarticulation present  Attention:  Intact   Memory: Grossly intact   Orientation:  AAO   Fund of Knowledge:  Adequate   Insight and Judgment: Appears fair/fair, history of intellectual disability       Past Psychiatric Hx:   Psychiatric Hospitalizations:  to [facility]  Outpatient Treatment: sees therapist for PTSD/depression/bipolar disorder/borderline personality disorder  Past Psychotropic Medication Trials: Zoloft, Depakote, reports benefit from both   Suicide Attempts/Self-Harm: twice in , tried to jump out of window--stopped by friend, cut self    Family Psychiatric Hx:  Mother and uncle with bipolar disorder      Social Hx:  Developmental: born 6 weeks late and required transfer due to respiratory problem     Family/Social/Activites: engaged to be  in October in [place], grandparents raised him, both       School: learning disability, special education, completed some college      Legal/Violence: none pending      Access to Firearms: No     Substance Use:   Drugs: marijuana  Alcohol: none, last drink beginning of year   Tobacco: quit last year, used to smoke 1 pack per week for 20 years     Social History     Socioeconomic History   • Marital status: Single     Spouse name: Not on file   • Number of children: Not on file   • Years of education: Not on file   • Highest  education level: Not on file   Occupational History   • Not on file   Social Needs   • Financial resource strain: Not on file   • Food insecurity     Worry: Not on file     Inability: Not on file   • Transportation needs     Medical: Not on file     Non-medical: Not on file   Tobacco Use   • Smoking status: Current Every Day Smoker     Packs/day: 0.25     Types: Cigarettes, Cigars     Last attempt to quit: 2018     Years since quittin.8   • Smokeless tobacco: Never Used   Substance and Sexual Activity   • Alcohol use: Yes     Frequency: 4 or more times a week     Drinks per session: 3 or 4     Binge frequency: Daily or almost daily   • Drug use: Yes     Types: Inhaled     Comment: marijuana   • Sexual activity: Not on file   Lifestyle   • Physical activity     Days per week: Not on file     Minutes per session: Not on file   • Stress: Not on file   Relationships   • Social connections     Talks on phone: Not on file     Gets together: Not on file     Attends [employer] service: Not on file     Active member of club or organization: Not on file     Attends meetings of clubs or organizations: Not on file     Relationship status: Not on file   • Intimate partner violence     Fear of current or ex partner: Not on file     Emotionally abused: Not on file     Physically abused: Not on file     Forced sexual activity: Not on file   Other Topics Concern   • Not on file   Social History Narrative   • Not on file             Medical Hx: labs, MARS, medications, etc were reviewed. Only those findings of potential interest to psychiatry are noted below:    Medical Conditions:   Neurological:     TBIs: Previous TBI's without apparent sequelae   SZs: Diagnosed seizure disorder   Strokes: Denies   Other:  Other medical symptoms:     Thyroid: Hypothyroidism   Diabetes: Diabetes   Cardiovascular disease: Denies    Past Medical History:   Diagnosis Date   • Anxiety     BIPOLAR   • ASTHMA    • Bipolar 1 disorder (HCC)    •  "Depression    • Diabetes ([facility])     Type II Diabetes   • Fall     passed out 2 wks ago   • Glaucoma    • Glaucoma 1982    both eyes/ blind on left eye   • Hypothyroidism    • Indigestion     once in a while   • Mental disorder     learning disabilities; speech impairment; developmental delays   • Murmur     since birth   • Pneumonia     remote   • Psychiatric problem 2002    PTSD   • S/P thyroidectomy    • Seizure ([facility]) 2010   • Seizure disorder ([facility])    • Unspecified disorder of thyroid        Allergies:   Allergies   Allergen Reactions   • Abilify Unspecified     \"Feeling tired, like I don't even know whats going on around me\"   • Fish      Pt reports fish causes him to be sick to his stomach         Medications (currently prescribed at [facility]):  Current Facility-Administered Medications   Medication Dose Route Frequency Provider Last Rate Last Dose   • sertraline (ZOLOFT) tablet 100 mg  100 mg Oral DAILY Patrica Ward M.D.   100 mg at 03/01/20 0640   • prazosin (MINIPRESS) capsule 2 mg  2 mg Oral Q EVENING Patrica Ward M.D.       • montelukast (SINGULAIR) tablet 10 mg  10 mg Oral DAILY Patrica Ward M.D.   10 mg at 03/01/20 0640   • metFORMIN (GLUCOPHAGE) tablet 1,000 mg  1,000 mg Oral BID WITH MEALS Patrica Ward M.D.   1,000 mg at 03/01/20 0829   • lisinopril (PRINIVIL) tablet 10 mg  10 mg Oral DAILY Patrica Ward M.D.   10 mg at 03/01/20 0640   • levothyroxine (SYNTHROID) tablet 200 mcg  200 mcg Oral AM ES Patrica Ward M.D.   200 mcg at 03/01/20 0639   • latanoprost (XALATAN) 0.005 % ophthalmic solution 1 Drop  1 Drop Both Eyes Nightly Patrica Ward M.D.       • insulin glargine (LANTUS) injection 20 Units  20 Units Subcutaneous Q EVENING Patrica Ward M.D.   20 Units at 03/01/20 0315   • divalproex (DEPAKOTE) delayed-release tablet 500 mg  500 mg Oral QAM Patrica Ward M.D.   500 mg at 03/01/20 0640   • busPIRone (BUSPAR) tablet 10 mg  10 mg Oral TID Patrica Ward M.D.   10 mg at 03/01/20 " 1218   • atorvastatin (LIPITOR) tablet 80 mg  80 mg Oral Q EVENING Patrica Ward M.D.       • aspirin EC (ECOTRIN) tablet 81 mg  81 mg Oral DAILY Patrica Ward M.D.   81 mg at 03/01/20 0639   • senna-docusate (PERICOLACE or SENOKOT S) 8.6-50 MG per tablet 2 Tab  2 Tab Oral BID Patrica Ward M.D.        And   • polyethylene glycol/lytes (MIRALAX) PACKET 1 Packet  1 Packet Oral QDAY PRN Patrica Ward M.D.        And   • magnesium hydroxide (MILK OF MAGNESIA) suspension 30 mL  30 mL Oral QDAY PRN Patrica Ward M.D.        And   • bisacodyl (DULCOLAX) suppository 10 mg  10 mg Rectal QDAY PRN Patrica Ward M.D.       • acetaminophen (TYLENOL) tablet 650 mg  650 mg Oral Q6HRS PRN Patrica Ward M.D.       • insulin regular (HUMULIN R) injection 1-6 Units  1-6 Units Subcutaneous 4X/DAY ACHS Patrica Ward M.D.   Stopped at 03/01/20 0700    And   • glucose 4 g chewable tablet 16 g  16 g Oral Q15 MIN PRN Patrica Ward M.D.        And   • DEXTROSE 10% BOLUS 250 mL  250 mL Intravenous Q15 MIN PRN Patrica Ward M.D.       • divalproex (DEPAKOTE) delayed-release tablet 1,500 mg  1,500 mg Oral Q EVENING Patrica Ward M.D.   1,500 mg at 03/01/20 0304   • famotidine (PEPCID) tablet 20 mg  20 mg Oral BID Patrica Ward M.D.   20 mg at 03/01/20 0640   • fenofibrate micronized (LOFIBRA) capsule 134 mg  134 mg Oral QAM Patrica Ward M.D.   134 mg at 03/01/20 0639       Labs  Recent Results (from the past 48 hour(s))   ACCU-CHEK GLUCOSE    Collection Time: 02/29/20  2:55 PM   Result Value Ref Range    Glucose - Accu-Ck 103 (H) 65 - 99 mg/dL   CBC WITH DIFFERENTIAL    Collection Time: 02/29/20  5:08 PM   Result Value Ref Range    WBC 10.3 4.8 - 10.8 K/uL    RBC 4.96 4.70 - 6.10 M/uL    Hemoglobin 14.1 14.0 - 18.0 g/dL    Hematocrit 44.3 42.0 - 52.0 %    MCV 89.3 81.4 - 97.8 fL    MCH 28.4 27.0 - 33.0 pg    MCHC 31.8 (L) 33.7 - 35.3 g/dL    RDW 46.2 35.9 - 50.0 fL    Platelet Count 414 164 - 446 K/uL    MPV 9.5 9.0  - 12.9 fL    Neutrophils-Polys 55.50 44.00 - 72.00 %    Lymphocytes 33.90 22.00 - 41.00 %    Monocytes 8.10 0.00 - 13.40 %    Eosinophils 0.60 0.00 - 6.90 %    Basophils 0.80 0.00 - 1.80 %    Immature Granulocytes 1.10 (H) 0.00 - 0.90 %    Nucleated RBC 0.00 /100 WBC    Neutrophils (Absolute) 5.71 1.82 - 7.42 K/uL    Lymphs (Absolute) 3.49 1.00 - 4.80 K/uL    Monos (Absolute) 0.83 0.00 - 0.85 K/uL    Eos (Absolute) 0.06 0.00 - 0.51 K/uL    Baso (Absolute) 0.08 0.00 - 0.12 K/uL    Immature Granulocytes (abs) 0.11 0.00 - 0.11 K/uL    NRBC (Absolute) 0.00 K/uL   COMP METABOLIC PANEL    Collection Time: 02/29/20  5:08 PM   Result Value Ref Range    Sodium 135 135 - 145 mmol/L    Potassium 4.4 3.6 - 5.5 mmol/L    Chloride 100 96 - 112 mmol/L    Co2 22 20 - 33 mmol/L    Anion Gap 13.0 (H) 0.0 - 11.9    Glucose 104 (H) 65 - 99 mg/dL    Bun 26 (H) 8 - 22 mg/dL    Creatinine 1.44 (H) 0.50 - 1.40 mg/dL    Calcium 9.3 8.5 - 10.5 mg/dL    AST(SGOT) 16 12 - 45 U/L    ALT(SGPT) 11 2 - 50 U/L    Alkaline Phosphatase 44 30 - 99 U/L    Total Bilirubin 0.7 0.1 - 1.5 mg/dL    Albumin 4.6 3.2 - 4.9 g/dL    Total Protein 7.0 6.0 - 8.2 g/dL    Globulin 2.4 1.9 - 3.5 g/dL    A-G Ratio 1.9 g/dL   TROPONIN    Collection Time: 02/29/20  5:08 PM   Result Value Ref Range    Troponin T 19 6 - 19 ng/L   Sed Rate    Collection Time: 02/29/20  5:08 PM   Result Value Ref Range    Sed Rate Westergren 5 0 - 15 mm/hour   CRP QUANTITIVE (NON-CARDIAC)    Collection Time: 02/29/20  5:08 PM   Result Value Ref Range    Stat C-Reactive Protein 0.13 0.00 - 0.75 mg/dL   ESTIMATED GFR    Collection Time: 02/29/20  5:08 PM   Result Value Ref Range    GFR If African American >60 >60 mL/min/1.73 m 2    GFR If Non African American 55 (A) >60 mL/min/1.73 m 2   TSH WITH REFLEX TO FT4    Collection Time: 02/29/20  5:08 PM   Result Value Ref Range    TSH 3.670 0.380 - 5.330 uIU/mL   URINE DRUG SCREEN    Collection Time: 02/29/20  9:07 PM   Result Value Ref Range     Amphetamines Urine Negative Negative    Barbiturates Negative Negative    Benzodiazepines Negative Negative    Cocaine Metabolite Negative Negative    Methadone Negative Negative    Opiates Negative Negative    Oxycodone Negative Negative    Phencyclidine -Pcp Negative Negative    Propoxyphene Negative Negative    Cannabinoid Metab Negative Negative   URINALYSIS,CULTURE IF INDICATED    Collection Time: 20  9:07 PM   Result Value Ref Range    Color Yellow     Character Clear     Specific Gravity 1.025 <1.035    Ph 5.5 5.0 - 8.0    Glucose >=1000 (A) Negative mg/dL    Ketones 15 (A) Negative mg/dL    Protein Negative Negative mg/dL    Bilirubin Negative Negative    Urobilinogen, Urine 0.2 Negative    Nitrite Negative Negative    Leukocyte Esterase Negative Negative    Occult Blood Negative Negative    Micro Urine Req see below    EKG (NOW)    Collection Time: 20 10:17 PM   Result Value Ref Range    Report       [facility] Emergency Dept.    Test Date:  2020  Pt Name:    HOLLY KIM                 Department: ER  MRN:        [id number]                      Room:       [facility]  Gender:     Male                         Technician: [id number]  :        1980                   Requested By:CARLOS EDUARDO CASTRO  Order #:    [id number]                    Reading MD:    Measurements  Intervals                                Axis  Rate:       77                           P:          49  CT:         160                          QRS:        42  QRSD:       112                          T:          16  QT:         372  QTc:        421    Interpretive Statements  SINUS RHYTHM  NONSPECIFIC INTRAVENTRICULAR CONDUCTION DELAY  BORDERLINE LOW VOLTAGE IN FRONTAL LEADS  BASELINE WANDER IN LEAD(S) V4  Compared to ECG 2019 18:09:46  Sinus tachycardia no longer present     EKG    Collection Time: 20 10:17 PM   Result Value Ref Range    Report       [facility] Emergency  Dept.    Test Date:  2020  Pt Name:    HOLLY KIM                 Department: ER  MRN:        [id number]                      Room:       S180  Gender:     Male                         Technician: [id number]  :        1980                   Requested By:CARLOS EDUARDO CASTRO  Order #:    [id number]                    Reading MD:    Measurements  Intervals                                Axis  Rate:       77                           P:          49  NH:         160                          QRS:        42  QRSD:       112                          T:          16  QT:         372  QTc:        421    Interpretive Statements  SINUS RHYTHM  NONSPECIFIC INTRAVENTRICULAR CONDUCTION DELAY  BORDERLINE LOW VOLTAGE IN FRONTAL LEADS  BASELINE WANDER IN LEAD(S) V4  Compared to ECG 2019 18:09:46  Sinus tachycardia no longer present     ACCU-CHEK GLUCOSE    Collection Time: 20  3:13 AM   Result Value Ref Range    Glucose - Accu-Ck 131 (H) 65 - 99 mg/dL   ACCU-CHEK GLUCOSE    Collection Time: 20  8:30 AM   Result Value Ref Range    Glucose - Accu-Ck 126 (H) 65 - 99 mg/dL   ACCU-CHEK GLUCOSE    Collection Time: 20 12:18 PM   Result Value Ref Range    Glucose - Accu-Ck 119 (H) 65 - 99 mg/dL       Cranial Imaging: reviewed  CT-HEAD W/O   Final Result      No acute intracranial abnormality is identified.      Stable severe white matter disease      DX-CHEST-PORTABLE (1 VIEW)   Final Result         1. No acute cardiopulmonary abnormalities are identified.          ECG: QTc 421    ASSESSMENT:   -Functional neurological disorder  --Working diagnosis until all neurological conditions are ruled out  -Major depressive disorder versus persistent depressive disorder  -PTSD    Patient presents for predominantly right-sided hemiparesis and denies significant psychosocial stressors although documentation reports recent anxious exacerbation due to concerns for paying rent.  He reports he is medication compliant and continues  in psychotherapy.  He denies recent psychosocial stressors, symptoms of depression, suicidal ideation, or significant anxiety.  He reports trauma symptoms are baseline.  At this time functional neurological disorder remains his working diagnosis until neurological conditions including Santosh's paralysis are ruled out.  Documentation reports that outpatient neurology is working to rule out MELAS.  Recommend continuing home medications and continuing following up with neurology.  No indication of an imminent threat to himself or others and so no hold initiated.  Psychiatry signing off.      PLAN:  Legal status: not applicable    -Continue home sertraline 100 mg daily  -Continue home buspirone 10 mg 3 times daily  -Continue home Depakote 500 mg every morning, 1500 mg every afternoon    Signing off     Labs/tests ordered: NA    Discussed patient with provider Dr. Pina    Thank you for the consult.

## 2020-03-01 NOTE — CARE PLAN
Problem: Communication  Goal: The ability to communicate needs accurately and effectively will improve  Outcome: PROGRESSING AS EXPECTED     Problem: Safety  Goal: Will remain free from injury  Outcome: PROGRESSING AS EXPECTED  Goal: Will remain free from falls  Outcome: PROGRESSING AS EXPECTED     Problem: Venous Thromboembolism (VTW)/Deep Vein Thrombosis (DVT) Prevention:  Goal: Patient will participate in Venous Thrombosis (VTE)/Deep Vein Thrombosis (DVT)Prevention Measures  Outcome: PROGRESSING AS EXPECTED     Problem: Bowel/Gastric:  Goal: Normal bowel function is maintained or improved  Outcome: PROGRESSING AS EXPECTED  Goal: Will not experience complications related to bowel motility  Outcome: PROGRESSING AS EXPECTED     Problem: Knowledge Deficit  Goal: Knowledge of disease process/condition, treatment plan, diagnostic tests, and medications will improve  Outcome: PROGRESSING AS EXPECTED  Goal: Knowledge of the prescribed therapeutic regimen will improve  Outcome: PROGRESSING AS EXPECTED     Problem: Mobility  Goal: Risk for activity intolerance will decrease  Outcome: PROGRESSING SLOWER THAN EXPECTED

## 2020-03-01 NOTE — ASSESSMENT & PLAN NOTE
"-per psych consult on 3/1 \"Major depressive disorder versus persistent depressive disorder\"  -continue home medications Depakote, Buspar and Zoloft  -OP FU with Neurology and Psychiatry         "

## 2020-03-01 NOTE — ED NOTES
Pt resting in bed eating box lunch provided, per ERP. Pt tolerating well and has even and unlabored breaths at this time. Will continue to monitor.

## 2020-03-02 LAB
ALBUMIN SERPL BCP-MCNC: 3.8 G/DL (ref 3.2–4.9)
BUN SERPL-MCNC: 27 MG/DL (ref 8–22)
CALCIUM SERPL-MCNC: 9 MG/DL (ref 8.5–10.5)
CHLORIDE SERPL-SCNC: 100 MMOL/L (ref 96–112)
CO2 SERPL-SCNC: 26 MMOL/L (ref 20–33)
CREAT SERPL-MCNC: 1.31 MG/DL (ref 0.5–1.4)
ERYTHROCYTE [DISTWIDTH] IN BLOOD BY AUTOMATED COUNT: 45.2 FL (ref 35.9–50)
GLUCOSE BLD-MCNC: 105 MG/DL (ref 65–99)
GLUCOSE BLD-MCNC: 112 MG/DL (ref 65–99)
GLUCOSE BLD-MCNC: 138 MG/DL (ref 65–99)
GLUCOSE BLD-MCNC: 179 MG/DL (ref 65–99)
GLUCOSE SERPL-MCNC: 110 MG/DL (ref 65–99)
HCT VFR BLD AUTO: 40 % (ref 42–52)
HGB BLD-MCNC: 12.6 G/DL (ref 14–18)
MCH RBC QN AUTO: 28.2 PG (ref 27–33)
MCHC RBC AUTO-ENTMCNC: 31.5 G/DL (ref 33.7–35.3)
MCV RBC AUTO: 89.5 FL (ref 81.4–97.8)
PHOSPHATE SERPL-MCNC: 3.8 MG/DL (ref 2.5–4.5)
PLATELET # BLD AUTO: 297 K/UL (ref 164–446)
PMV BLD AUTO: 9.7 FL (ref 9–12.9)
POTASSIUM SERPL-SCNC: 4.3 MMOL/L (ref 3.6–5.5)
RBC # BLD AUTO: 4.47 M/UL (ref 4.7–6.1)
SODIUM SERPL-SCNC: 135 MMOL/L (ref 135–145)
WBC # BLD AUTO: 9.7 K/UL (ref 4.8–10.8)

## 2020-03-02 PROCEDURE — 80069 RENAL FUNCTION PANEL: CPT

## 2020-03-02 PROCEDURE — A9270 NON-COVERED ITEM OR SERVICE: HCPCS | Performed by: HOSPITALIST

## 2020-03-02 PROCEDURE — 85027 COMPLETE CBC AUTOMATED: CPT

## 2020-03-02 PROCEDURE — 99232 SBSQ HOSP IP/OBS MODERATE 35: CPT | Performed by: INTERNAL MEDICINE

## 2020-03-02 PROCEDURE — 700102 HCHG RX REV CODE 250 W/ 637 OVERRIDE(OP): Performed by: HOSPITALIST

## 2020-03-02 PROCEDURE — 97162 PT EVAL MOD COMPLEX 30 MIN: CPT

## 2020-03-02 PROCEDURE — 82962 GLUCOSE BLOOD TEST: CPT | Mod: 91

## 2020-03-02 PROCEDURE — 36415 COLL VENOUS BLD VENIPUNCTURE: CPT

## 2020-03-02 PROCEDURE — 770020 HCHG ROOM/CARE - TELE (206)

## 2020-03-02 PROCEDURE — 97166 OT EVAL MOD COMPLEX 45 MIN: CPT

## 2020-03-02 RX ADMIN — FAMOTIDINE 20 MG: 20 TABLET ORAL at 05:55

## 2020-03-02 RX ADMIN — LATANOPROST 1 DROP: 50 SOLUTION OPHTHALMIC at 21:12

## 2020-03-02 RX ADMIN — SERTRALINE HYDROCHLORIDE 100 MG: 100 TABLET ORAL at 08:09

## 2020-03-02 RX ADMIN — FENOFIBRATE 134 MG: 134 CAPSULE ORAL at 05:55

## 2020-03-02 RX ADMIN — INSULIN HUMAN 1 UNITS: 100 INJECTION, SOLUTION PARENTERAL at 08:16

## 2020-03-02 RX ADMIN — DIVALPROEX SODIUM 500 MG: 500 TABLET, DELAYED RELEASE ORAL at 05:55

## 2020-03-02 RX ADMIN — ATORVASTATIN CALCIUM 80 MG: 80 TABLET, FILM COATED ORAL at 17:19

## 2020-03-02 RX ADMIN — BUSPIRONE HYDROCHLORIDE 10 MG: 10 TABLET ORAL at 11:56

## 2020-03-02 RX ADMIN — DIVALPROEX SODIUM 1500 MG: 500 TABLET, DELAYED RELEASE ORAL at 17:19

## 2020-03-02 RX ADMIN — BUSPIRONE HYDROCHLORIDE 10 MG: 10 TABLET ORAL at 05:55

## 2020-03-02 RX ADMIN — METFORMIN HYDROCHLORIDE 1000 MG: 500 TABLET ORAL at 08:09

## 2020-03-02 RX ADMIN — INSULIN GLARGINE 20 UNITS: 100 INJECTION, SOLUTION SUBCUTANEOUS at 17:20

## 2020-03-02 RX ADMIN — LISINOPRIL 10 MG: 10 TABLET ORAL at 05:55

## 2020-03-02 RX ADMIN — FAMOTIDINE 20 MG: 20 TABLET ORAL at 17:19

## 2020-03-02 RX ADMIN — MONTELUKAST 10 MG: 10 TABLET, FILM COATED ORAL at 08:09

## 2020-03-02 RX ADMIN — PRAZOSIN HYDROCHLORIDE 2 MG: 2 CAPSULE ORAL at 17:19

## 2020-03-02 RX ADMIN — BUSPIRONE HYDROCHLORIDE 10 MG: 10 TABLET ORAL at 17:19

## 2020-03-02 RX ADMIN — LEVOTHYROXINE SODIUM 200 MCG: 200 TABLET ORAL at 05:55

## 2020-03-02 RX ADMIN — METFORMIN HYDROCHLORIDE 1000 MG: 500 TABLET ORAL at 17:18

## 2020-03-02 RX ADMIN — ACETAMINOPHEN 650 MG: 325 TABLET, FILM COATED ORAL at 08:09

## 2020-03-02 RX ADMIN — ASPIRIN 81 MG: 81 TABLET, COATED ORAL at 05:55

## 2020-03-02 ASSESSMENT — COGNITIVE AND FUNCTIONAL STATUS - GENERAL
MOVING FROM LYING ON BACK TO SITTING ON SIDE OF FLAT BED: UNABLE
EATING MEALS: A LITTLE
SUGGESTED CMS G CODE MODIFIER MOBILITY: CL
TURNING FROM BACK TO SIDE WHILE IN FLAT BAD: A LITTLE
WALKING IN HOSPITAL ROOM: A LITTLE
SUGGESTED CMS G CODE MODIFIER DAILY ACTIVITY: CK
DRESSING REGULAR LOWER BODY CLOTHING: A LITTLE
DAILY ACTIVITIY SCORE: 16
DRESSING REGULAR UPPER BODY CLOTHING: A LITTLE
STANDING UP FROM CHAIR USING ARMS: A LITTLE
MOBILITY SCORE: 13
MOVING TO AND FROM BED TO CHAIR: UNABLE
CLIMB 3 TO 5 STEPS WITH RAILING: A LOT
HELP NEEDED FOR BATHING: A LOT
PERSONAL GROOMING: A LITTLE
TOILETING: A LOT

## 2020-03-02 ASSESSMENT — ACTIVITIES OF DAILY LIVING (ADL): TOILETING: INDEPENDENT

## 2020-03-02 ASSESSMENT — GAIT ASSESSMENTS: GAIT LEVEL OF ASSIST: UNABLE TO PARTICIPATE

## 2020-03-02 NOTE — PROGRESS NOTES
Pt AOx4, c/o headache- tylenol given, see MAR. Pt states he feels very weak and cannot life his legs off the bed. PT/OT for treat today. Fall precautions in place.

## 2020-03-02 NOTE — CARE PLAN
Problem: Communication  Goal: The ability to communicate needs accurately and effectively will improve  Outcome: PROGRESSING AS EXPECTED     Problem: Safety  Goal: Will remain free from injury  Outcome: PROGRESSING AS EXPECTED  Goal: Will remain free from falls  Outcome: PROGRESSING AS EXPECTED     Problem: Bowel/Gastric:  Goal: Normal bowel function is maintained or improved  Outcome: PROGRESSING AS EXPECTED  Goal: Will not experience complications related to bowel motility  Outcome: PROGRESSING AS EXPECTED     Problem: Mobility  Goal: Risk for activity intolerance will decrease  Outcome: PROGRESSING SLOWER THAN EXPECTED

## 2020-03-02 NOTE — PROGRESS NOTES
Assumed care of patient 1900. A/O x4, resting comfortably in bed. Denies pain or other needs. Fall status, call light, and plan of care discussed.

## 2020-03-02 NOTE — CARE PLAN
Problem: Communication  Goal: The ability to communicate needs accurately and effectively will improve  Outcome: PROGRESSING AS EXPECTED     Problem: Safety  Goal: Will remain free from injury  Outcome: PROGRESSING AS EXPECTED     Problem: Bowel/Gastric:  Goal: Normal bowel function is maintained or improved  Outcome: PROGRESSING AS EXPECTED  Flowsheets (Taken 3/2/2020 0800)  Last BM: 03/01/20

## 2020-03-02 NOTE — PROGRESS NOTES
"Hospital Medicine Daily Progress Note    Date of Service  3/2/2020    Chief Complaint  39 y.o. male admitted 2/29/2020 with Weakness (Generalized weakness ) and Visual Problems (\"My vision started going dark\" onset at 1230)    Hospital Course    Hospitalized last December 2019 for R hemiparesis. At that hospitalization, he was not a candidate for TPA.  CT scan of the head, CTA of the head and neck were negative.  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  Work-up including MELAS and CADASIL were obtained. Patient was also treated for UTI. Throughout his hospital course, he continued to complain of right-sided weakness and paralysis although his reflexes remained intact and his physical exam was not consistent with his reported symptoms. There was concern of possible psychogenic element to his complaints.   Last Neurology assessment on 12/27:  \"39-year old male with PMhx significant for anxiety/depression, Bipolar 1 disorder, severe hypothyroid, baseline developmental delay/intellectual disorder, seizure disorder (epileptic vs psychogenic; on VPA), and diabetes mellitus who presented to [facility] on 12/27/19 for a chief complaint of Right sided weakness; not a candidate for IV tPA given presentation time greater than 4.5 hours from time of symptom onset. 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 In the setting of negative MRI Brain, initial differential dx include mitochondrial encephalomyelopathy with lactic acidosis and stroke like episodes (MELAS), though will note that this underlying, chronic disease process is best worked up and treated as outpatient (once acute ischemia has been ruled out).  Despite this, there remains little explanation for patient's persistent RUE/RLE weakness/hemiparesis and hemisensory loss. Note near normal " "TSH; VPA level WNL; Ammonia WNL; have suspicion for at least partial involvement of functional/psychosomatic etiology of symptoms (also given his psychiatric history). For this would recommend to consider psychiatric consultation (inpatient or outpatient). Patient may continue secondary stroke prevention; ASA 81 mg PO q day and Atorvastatin 80 mg PO q HS (note TG > 500, LDL incalculable; this finding may also be consistent with underlying genetic disorder); continue Lantus and PO antihyperglycemics for diabetes mellitus. Provide counseling regarding life style and risk factor modification for primary stroke prevention.  PT/OT and Case management on board for dispo; additionally, will coordinate outpatient neurology appointment for the patient for further work up of the above.\" Dr. Charles Louise  He was subsequently discharged to skilled nursing and neurology I recommend continued follow-up for possible MELAS on an outpatient basis. 3d prior to admission he was seen in our emergency room for agitation and aggressiveness.  He was diagnosed with situational anxiety and sent back to his assisted living facility.   Presents with Weakness (Generalized weakness ) and Visual Problems (\"My vision started going dark\" onset at 1230)  Basically one day history of weakness and visual complaints.  At the ED, afebrile and hemodynamically stable.   CT head:  No acute intracranial abnormality is identified.  Stable severe white matter disease  CXR:  1. No acute cardiopulmonary abnormalities are identified.  Not hypoglycemic.        Interval Problem Update  Somewhat withdrawn, generalized weakness, cognitive deficits  Remains poor insight.  Does not want to walk or can;t walk with PT    Consultants/Specialty  Neurology  Psychiatry    Code Status  full    Disposition  PT/OT ordered  Liliana needs rehab/SNF  May be a difficult discharge because of poor clarity of outcome. Neurology recommends outpatient eval for MELAS, no " further mgmt or workup in patient, Psychiatry feels nothing to offer and SIGNING OFF. May need referral to Dr. Head, Psychiatry who subspecializes with psychomotor problems.    Review of Systems  Review of Systems   Unable to perform ROS: Mental acuity   All other systems reviewed and are negative.       Physical Exam  Temp:  [35.8 °C (96.5 °F)-37.2 °C (98.9 °F)] 36.2 °C (97.2 °F)  Pulse:  [60-76] 69  Resp:  [16-18] 18  BP: (107-117)/(43-78) 109/56  SpO2:  [93 %-99 %] 95 %    Physical Exam  Vitals signs and nursing note reviewed.   HENT:      Head: Normocephalic and atraumatic.      Right Ear: External ear normal.      Left Ear: External ear normal.      Nose: Nose normal.      Mouth/Throat:      Mouth: Mucous membranes are moist.   Eyes:      General: No scleral icterus.     Conjunctiva/sclera: Conjunctivae normal.   Neck:      Musculoskeletal: Normal range of motion and neck supple.   Cardiovascular:      Rate and Rhythm: Normal rate and regular rhythm.      Heart sounds: No murmur. No friction rub. No gallop.    Pulmonary:      Effort: Pulmonary effort is normal.      Breath sounds: Normal breath sounds.   Abdominal:      General: Abdomen is flat. Bowel sounds are normal. There is no distension.      Palpations: Abdomen is soft.      Tenderness: There is no abdominal tenderness. There is no guarding.   Musculoskeletal: Normal range of motion.   Skin:     General: Skin is warm.   Neurological:      Mental Status: He is alert and oriented to person, place, and time. Mental status is at baseline.      Motor: Weakness (generalized, still unable to walk) present.      Comments: Cogntive deficits   Psychiatric:         Mood and Affect: Mood normal.         Behavior: Behavior normal.         Thought Content: Thought content normal.         Judgment: Judgment normal.      Comments: Flat affect  Somewhat withdrawn         Fluids    Intake/Output Summary (Last 24 hours) at 3/2/2020 1414  Last data filed at 3/2/2020  "1400  Gross per 24 hour   Intake 520 ml   Output 2675 ml   Net -2155 ml       Laboratory  Recent Labs     02/29/20  1708 03/02/20  0432   WBC 10.3 9.7   RBC 4.96 4.47*   HEMOGLOBIN 14.1 12.6*   HEMATOCRIT 44.3 40.0*   MCV 89.3 89.5   MCH 28.4 28.2   MCHC 31.8* 31.5*   RDW 46.2 45.2   PLATELETCT 414 297   MPV 9.5 9.7     Recent Labs     02/29/20  1708 03/01/20  1514 03/02/20  0432   SODIUM 135 135 135   POTASSIUM 4.4 4.0 4.3   CHLORIDE 100 101 100   CO2 22 24 26   GLUCOSE 104* 134* 110*   BUN 26* 23* 27*   CREATININE 1.44* 1.11 1.31   CALCIUM 9.3 9.1 9.0                   Imaging  CT-HEAD W/O   Final Result      No acute intracranial abnormality is identified.      Stable severe white matter disease      DX-CHEST-PORTABLE (1 VIEW)   Final Result         1. No acute cardiopulmonary abnormalities are identified.           Assessment/Plan  * Weakness- (present on admission)  Assessment & Plan  \"This is a global weakness reported by the patient in addition to right-sided vision changes.  It is difficult to appreciate any weakness on bedside exam and certainly there are no lateralizing symptoms.  Query if this is related to his recent diagnosis of leukodystrophy versus psychogenic.  No seizure activity noted thus far.  I will request a PT and OT consultation for assessment of his weakness.  In light of his recent thorough neurologic work-up, and lack of focal symptoms, I will not repeat MRI of the brain at this point.  Pending PT and OT consultation, we may consider an neurologic consult although they did note under last hospitalization that he should be worked up on an outpatient basis.  The patient may also benefit from a psychiatric eval.\"  Neurology outpatient MELAS, referral being made, no further inpatient management or work-upNeurology recommend Psychiatry eval for psychogenic causes  Psychiatry feels neurologic, no further management or work-up.   Currently he \"cannot walk\", difficult discharge (rehab vs SNF). If " "rehab and SNF are declining because of unclear diagnosis or prognosis, reconsult Neurology for formal evaluation and Psychiatry, preferably Dr. Head who does psychomotor evaluation (currently she is not on call)      TOMY (acute kidney injury) ([facility])  Assessment & Plan  \"This is mild, IV fluids overnight and repeat chemistries in the morning.\"  Ordered these chemistries myself.    HTN (hypertension)  Assessment & Plan  Patient normotensive, resume home Minipress, Prinivil.    HLD (hyperlipidemia)- (present on admission)  Assessment & Plan  This is chronic, resume home fenofibrate and aspirin.    Diabetes mellitus type 2 in obese (HCC)- (present on admission)  Assessment & Plan  Resume home Lantus, metformin, monitor with Accu-Cheks and cover with insulin sliding scale.    History of seizure- (present on admission)  Assessment & Plan  No seizure activity noted since admission to the hospital.  Continue home Depakote.    Psychiatric problem- (present on admission)  Assessment & Plan  Resume home Zoloft and BuSpar    Acquired hypothyroidism- (present on admission)  Assessment & Plan  History of poor control, continue home Synthroid and check TSH.       VTE prophylaxis: SCDs  Gastrointestinal prophylaxis: None  Antibiotics:   Ordered Anti-infectives (9999h ago, onward)    None        Diet:   Orders Placed This Encounter   Procedures   • Diet Order Diabetic     Standing Status:   Standing     Number of Occurrences:   1     Order Specific Question:   Diet:     Answer:   Diabetic [3]      Prognosis: Guarded  Risk: The Patient is at HIGH risk for inpatient complications and decompensation secondary to his multiple cormorbidities including   Problem   Tomy (Acute Kidney Injury) ([facility])   Weakness   Diabetes Mellitus Type 2 in Obese (Hcc)   History of Seizure      I have personally reviewed notes, labs, vitals, imaging.  I discussed the plan of care with bedside RN as well as on multidisciplinary rounds  I have performed a " physical exam and reviewed and updated ROS and Plan today 3/2/2020. In review of yesterday's note 3/1/2020   there are no changes except as documented above.

## 2020-03-02 NOTE — RESPIRATORY CARE
COPD EDUCATION by COPD CLINICAL EDUCATOR  3/2/2020 at 6:02 AM by Lupe Milan, RRT     Patient reviewed by COPD education team. Patient does not have a history or diagnosis of COPD and is a non-smoker, therefore does not qualify for the COPD program.

## 2020-03-02 NOTE — THERAPY
"Occupational Therapy Evaluation completed.   Functional Status:  Pt is a 38yo male admitted for generalized weakness and intermittent darkening vision. PMHx of anxiety/depression, Bipolar 1 disorder, baseline developmental delay/intellectual disorder, seizure disorder, and DMII. Supine>sit with SPV. Max A and extra time for LB dressing. Sit>stand with min A and bed elevated, req attempting x3 before able to fully stand, req v/cs throughout. Steps to HOB and then to chair with min A and FWW; req extra time and v/cs for safety. Declined grooming/toileting; left in chair with breakfast. Reports decreased strength in RUE, but used functionally throughout session. Reports lives in group home where facility completes IADLs, but unable to assist with ADLs. Per [facility] (group home), pt PLOF was functional ambulation with SPV and no AD; able to complete all ADLs I'ly. Unclear if CLOF is d/t functional deficits or psychiatric issues. Limited by decreased functional mobility, activity tolerance, cognition, balance, and pain which are currently affecting pt's ability to complete ADLs/IADLs at baseline. Currently recommend acute OT, and Recommend post-acute placement for additional occupational therapy services prior to discharge home.    Plan of Care: Will benefit from Occupational Therapy 3 times per week  Discharge Recommendations:  Equipment: Will Continue to Assess for Equipment Needs. Post-acute therapy: Recommend post-acute placement for additional occupational therapy services prior to discharge home.      See \"Rehab Therapy-Acute\" Patient Summary Report for complete documentation.    "

## 2020-03-02 NOTE — DISCHARGE PLANNING
Patient is eligible for Medicaid Meds to Beds at discharge if they have coverage with [place] Medicaid, Medicaid FFS, Medicaid HMO ([facility]), or [place]. This service is provided through the [facility] if orders are received by the pharmacy prior to 4pm Monday through Friday excluding holidays. Preferred pharmacy has been changed to [facility]. Please call [phone] prior to discharge.

## 2020-03-02 NOTE — THERAPY
"Physical Therapy Evaluation completed.   Bed Mobility: Supine to sit: Supervised    Transfers: Sit to stand: Minimal Assist   Gait: Unable to Participate, took side steps near EOB with FWW minimal assist  Plan of Care: Will benefit from Physical Therapy 3 times per week  Discharge Recommendations: Equipment: Will Continue to Assess for Equipment Needs. Post-acute therapy: Recommend post-acute placement for continued physical therapy services prior to discharge home.       Pt is 40 y/o presenting acutely for global weakness and R-sided visual deficit. Pt with prior hospitalization in December with similar symptoms. Upon evaluation, pt reporting R-sided weakness during fxnl mob. Pt requiring multiple attempts to initiate STS but was able to achieve it with minimal assist. Initially standing, pt shaking BLE by flexing/extending his knees but no knee buckling noted. Pt's BLE more stable when taking side steps and transferring from bed to chair. Pt reporting that he was unable to attempt ambulation 2/2 feeling fatigued and dizzy. PT will follow in acute setting.    See \"Rehab Therapy-Acute\" Patient Summary Report for complete documentation.     "

## 2020-03-03 ENCOUNTER — APPOINTMENT (OUTPATIENT)
Dept: RADIOLOGY | Facility: MEDICAL CENTER | Age: 40
DRG: 948 | End: 2020-03-03
Attending: NURSE PRACTITIONER
Payer: MEDICAID

## 2020-03-03 PROBLEM — H53.9 VISION CHANGES: Status: ACTIVE | Noted: 2020-03-03

## 2020-03-03 LAB
ALBUMIN SERPL BCP-MCNC: 3.9 G/DL (ref 3.2–4.9)
ANION GAP SERPL CALC-SCNC: 8 MMOL/L (ref 0–11.9)
BUN SERPL-MCNC: 29 MG/DL (ref 8–22)
CALCIUM SERPL-MCNC: 9 MG/DL (ref 8.5–10.5)
CHLORIDE SERPL-SCNC: 101 MMOL/L (ref 96–112)
CO2 SERPL-SCNC: 28 MMOL/L (ref 20–33)
CREAT SERPL-MCNC: 1.06 MG/DL (ref 0.5–1.4)
ERYTHROCYTE [DISTWIDTH] IN BLOOD BY AUTOMATED COUNT: 44 FL (ref 35.9–50)
GLUCOSE BLD-MCNC: 115 MG/DL (ref 65–99)
GLUCOSE BLD-MCNC: 119 MG/DL (ref 65–99)
GLUCOSE BLD-MCNC: 121 MG/DL (ref 65–99)
GLUCOSE BLD-MCNC: 134 MG/DL (ref 65–99)
GLUCOSE SERPL-MCNC: 101 MG/DL (ref 65–99)
HCT VFR BLD AUTO: 38.5 % (ref 42–52)
HGB BLD-MCNC: 12.3 G/DL (ref 14–18)
MCH RBC QN AUTO: 28.2 PG (ref 27–33)
MCHC RBC AUTO-ENTMCNC: 31.9 G/DL (ref 33.7–35.3)
MCV RBC AUTO: 88.3 FL (ref 81.4–97.8)
PHOSPHATE SERPL-MCNC: 3.7 MG/DL (ref 2.5–4.5)
PLATELET # BLD AUTO: 278 K/UL (ref 164–446)
PMV BLD AUTO: 9.8 FL (ref 9–12.9)
POTASSIUM SERPL-SCNC: 3.7 MMOL/L (ref 3.6–5.5)
RBC # BLD AUTO: 4.36 M/UL (ref 4.7–6.1)
SODIUM SERPL-SCNC: 137 MMOL/L (ref 135–145)
WBC # BLD AUTO: 9.5 K/UL (ref 4.8–10.8)

## 2020-03-03 PROCEDURE — 99232 SBSQ HOSP IP/OBS MODERATE 35: CPT | Performed by: HOSPITALIST

## 2020-03-03 PROCEDURE — 70551 MRI BRAIN STEM W/O DYE: CPT

## 2020-03-03 PROCEDURE — 80069 RENAL FUNCTION PANEL: CPT

## 2020-03-03 PROCEDURE — A9270 NON-COVERED ITEM OR SERVICE: HCPCS | Performed by: HOSPITALIST

## 2020-03-03 PROCEDURE — 82962 GLUCOSE BLOOD TEST: CPT | Mod: 91

## 2020-03-03 PROCEDURE — 85027 COMPLETE CBC AUTOMATED: CPT

## 2020-03-03 PROCEDURE — 770020 HCHG ROOM/CARE - TELE (206)

## 2020-03-03 PROCEDURE — 700102 HCHG RX REV CODE 250 W/ 637 OVERRIDE(OP): Performed by: HOSPITALIST

## 2020-03-03 PROCEDURE — 36415 COLL VENOUS BLD VENIPUNCTURE: CPT

## 2020-03-03 RX ADMIN — ACETAMINOPHEN 650 MG: 325 TABLET, FILM COATED ORAL at 09:03

## 2020-03-03 RX ADMIN — MONTELUKAST 10 MG: 10 TABLET, FILM COATED ORAL at 04:55

## 2020-03-03 RX ADMIN — BUSPIRONE HYDROCHLORIDE 10 MG: 10 TABLET ORAL at 12:16

## 2020-03-03 RX ADMIN — SERTRALINE HYDROCHLORIDE 100 MG: 100 TABLET ORAL at 04:55

## 2020-03-03 RX ADMIN — FENOFIBRATE 134 MG: 134 CAPSULE ORAL at 04:55

## 2020-03-03 RX ADMIN — FAMOTIDINE 20 MG: 20 TABLET ORAL at 17:56

## 2020-03-03 RX ADMIN — LATANOPROST 1 DROP: 50 SOLUTION OPHTHALMIC at 20:17

## 2020-03-03 RX ADMIN — METFORMIN HYDROCHLORIDE 1000 MG: 500 TABLET ORAL at 17:56

## 2020-03-03 RX ADMIN — SENNOSIDES AND DOCUSATE SODIUM 2 TABLET: 8.6; 5 TABLET ORAL at 04:55

## 2020-03-03 RX ADMIN — ASPIRIN 81 MG: 81 TABLET, COATED ORAL at 04:55

## 2020-03-03 RX ADMIN — DIVALPROEX SODIUM 1500 MG: 500 TABLET, DELAYED RELEASE ORAL at 17:55

## 2020-03-03 RX ADMIN — METFORMIN HYDROCHLORIDE 1000 MG: 500 TABLET ORAL at 09:03

## 2020-03-03 RX ADMIN — DIVALPROEX SODIUM 500 MG: 500 TABLET, DELAYED RELEASE ORAL at 04:55

## 2020-03-03 RX ADMIN — LEVOTHYROXINE SODIUM 200 MCG: 200 TABLET ORAL at 04:55

## 2020-03-03 RX ADMIN — INSULIN GLARGINE 20 UNITS: 100 INJECTION, SOLUTION SUBCUTANEOUS at 18:00

## 2020-03-03 RX ADMIN — BUSPIRONE HYDROCHLORIDE 10 MG: 10 TABLET ORAL at 17:56

## 2020-03-03 RX ADMIN — BUSPIRONE HYDROCHLORIDE 10 MG: 10 TABLET ORAL at 04:55

## 2020-03-03 RX ADMIN — ATORVASTATIN CALCIUM 80 MG: 80 TABLET, FILM COATED ORAL at 17:56

## 2020-03-03 RX ADMIN — FAMOTIDINE 20 MG: 20 TABLET ORAL at 04:55

## 2020-03-03 ASSESSMENT — ENCOUNTER SYMPTOMS
SEIZURES: 0
EYE PAIN: 0
FEVER: 0
PHOTOPHOBIA: 0
DEPRESSION: 0
ABDOMINAL PAIN: 0
HEADACHES: 0
CHILLS: 0
NAUSEA: 0
SENSORY CHANGE: 1
PALPITATIONS: 0
DIZZINESS: 1
DIARRHEA: 0
COUGH: 0
WEAKNESS: 1
SHORTNESS OF BREATH: 0
FOCAL WEAKNESS: 0
SORE THROAT: 0
VOMITING: 0
TINGLING: 0
MYALGIAS: 0
WHEEZING: 0

## 2020-03-03 NOTE — DISCHARGE PLANNING
Received Choice form at 1400  Agency/Facility Name: [facility]  Referral sent per Choice form @ 1694

## 2020-03-03 NOTE — CARE PLAN
Problem: Safety  Goal: Will remain free from injury  Outcome: PROGRESSING AS EXPECTED  Intervention: Educate patient and significant other/support system about adaptive mobility strategies and safe transfers  Note: Encouraging pt to get up to chair and increase mobility.      Problem: Pain Management  Goal: Pain level will decrease to patient's comfort goal  Outcome: PROGRESSING AS EXPECTED  Intervention: Follow pain managment plan developed in collaboration with patient and Interdisciplinary Team  Note: Tylenol PRN for pain.

## 2020-03-03 NOTE — DISCHARGE PLANNING
Anticipated Discharge Disposition:   SNF    Action:    Pt admitted with weakness, vision changes. Hx genetic leukodystrophy, BPD, seizure DO.    Pt stated he lives at [facility] [address] [place].  He has not required any DME there; however, stated he used FWW when he was at [place].  He uses RTC access.  He recently started receiving SSI but does not know amount.  Pt does not want to go back to [place].  He was agreeable to SNF.  Choices obtained for [facility], [facility], and [facility]. Choice form signed by patient and form faxed to [facility].    OT/PT recommending post acute placement.    Barriers to Discharge:    SNF acceptance    Plan:    F/U referrals.  Care Transition Team Assessment    Information Source  Orientation : Oriented x 4  Information Given By: Patient  Who is responsible for making decisions for patient? : Patient    Readmission Evaluation  Is this a readmission?: Yes - unplanned readmission    Elopement Risk  Legal Hold: No  Ambulatory or Self Mobile in Wheelchair: No-Not an Elopement Risk  Elopement Risk: Not at Risk for Elopement    Interdisciplinary Discharge Planning  Lives with - Patient's Self Care Capacity: Attendant / Paid Care Giver  Patient or legal guardian wants to designate a caregiver (see row info): No  Housing / Facility: Assisted Living Residence  Prior Services: Continuous (24 Hour) Care Giving Per Service    Discharge Preparedness  What is your plan after discharge?: Skilled nursing facility  What are your discharge supports?: Parent  Prior Functional Level: Ambulatory, Independent with Activities of Daily Living, Independent with Medication Management  Difficulity with ADLs: Walking  Difficulity with IADLs: Cooking, Driving, Managing medication, Shopping    Functional Assesment  Prior Functional Level: Ambulatory, Independent with Activities of Daily Living, Independent with Medication Management    Finances  Financial Barriers to Discharge: No  Prescription Coverage: Yes    Vision  / Hearing Impairment  Vision Impairment : Yes  Right Eye Vision: Impaired  Left Eye Vision: Blind, Wears Glasses         Advance Directive  Advance Directive?: None    Domestic Abuse  Have you ever been the victim of abuse or violence?: No  Physical Abuse or Sexual Abuse: No  Verbal Abuse or Emotional Abuse: No  Possible Abuse Reported to:: Not Applicable         Discharge Risks or Barriers  Discharge risks or barriers?: No    Anticipated Discharge Information  Anticipated discharge disposition: SNF  Discharge Contact Phone Number: [phone]

## 2020-03-03 NOTE — PROGRESS NOTES
Pt AOx4, c/o slight headache. Encouraged pt to get up to chair for breakfast, he states he would like to wait until lunch to get up. Fall precautions in place.

## 2020-03-03 NOTE — PROGRESS NOTES
"Hospital Medicine Daily Progress Note    Date of Service  3/3/2020    Chief Complaint  Vision changes and weakness    Hospital Course   Mr. Prabhakar is a 39-year-old male with PMH significant for hypothyroidism, DM 2, seizure disorder, HLD, HTN and PTSD/psych disorder currently on medications who presented 2/29/2020 with weakness and vision changes.  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 Work-up including MELAS and CADASIL were obtained. Patient was also treated for UTI. He was diagnosed with situational anxiety and sent back to his assisted living facility.  He complained of feeling paralyzed and unable to ambulate.  CT head was unremarkable.  He was evaluated by psychiatry on 3/1/2020 who recommended continuing his home medications of Depakote, BuSpar and Zoloft.      Interval Problem Update  -He reports waxing and waning vision changes.  \"Things just go dark\".  I have ordered brain MRI  -He reports difficulties ambulating/mobilizing.  He was incontinent of stool in bed this morning and required nursing assistance for clean up    Consultants/Specialty  -Psychiatry -signed off    Code Status  FULL    Disposition  SNF    Review of Systems  Review of Systems   Constitutional: Positive for malaise/fatigue. Negative for chills and fever.   HENT: Negative for congestion and sore throat.    Eyes: Negative for photophobia and pain.        \"things go dark\"     Respiratory: Negative for cough, shortness of breath and wheezing.    Cardiovascular: Negative for chest pain and palpitations.   Gastrointestinal: Negative for abdominal pain, diarrhea, nausea and vomiting.   Genitourinary: Negative for dysuria, frequency and urgency.   Musculoskeletal: Negative for myalgias.   Skin: Negative.    Neurological: Positive for dizziness, sensory change and weakness. Negative for tingling, focal weakness, " seizures and headaches.   Psychiatric/Behavioral: Negative for depression and suicidal ideas.   All other systems reviewed and are negative.       Physical Exam  Temp:  [36.1 °C (97 °F)-37.2 °C (98.9 °F)] 36.9 °C (98.4 °F)  Pulse:  [50-74] 59  Resp:  [15-16] 16  BP: (101-121)/(60-69) 116/66  SpO2:  [95 %-98 %] 97 %    Physical Exam  Vitals signs and nursing note reviewed.   Constitutional:       General: He is not in acute distress.     Appearance: Normal appearance. He is obese. He is not ill-appearing or diaphoretic.   HENT:      Head: Normocephalic and atraumatic.      Right Ear: Hearing normal.      Left Ear: Hearing normal.      Nose: Nose normal.      Mouth/Throat:      Lips: Pink.      Mouth: Mucous membranes are moist.   Neck:      Musculoskeletal: Full passive range of motion without pain.      Vascular: No JVD.   Cardiovascular:      Rate and Rhythm: Normal rate and regular rhythm.      Pulses: Normal pulses.      Heart sounds: Normal heart sounds.   Pulmonary:      Effort: Pulmonary effort is normal.      Breath sounds: Normal breath sounds. No wheezing or rhonchi.   Abdominal:      General: Bowel sounds are normal.      Palpations: Abdomen is soft.      Tenderness: There is no abdominal tenderness.   Genitourinary:     Comments: Voiding   Musculoskeletal:      Right lower leg: No edema.      Left lower leg: No edema.      Comments: RUE 5/5  RLE 4-5/5  LUE 5/5  LLE 4-5/5     Skin:     General: Skin is warm and dry.      Findings: No erythema.   Neurological:      Mental Status: He is alert and oriented to person, place, and time.      Cranial Nerves: No cranial nerve deficit.   Psychiatric:         Attention and Perception: Attention normal.         Behavior: Behavior normal. Behavior is cooperative.         Cognition and Memory: Cognition normal.         Judgment: Judgment normal.         Fluids    Intake/Output Summary (Last 24 hours) at 3/3/2020 1450  Last data filed at 3/3/2020 1000  Gross per 24 hour  "  Intake 640 ml   Output 1700 ml   Net -1060 ml       Laboratory  Recent Labs     02/29/20  1708 03/02/20  0432 03/03/20  0416   WBC 10.3 9.7 9.5   RBC 4.96 4.47* 4.36*   HEMOGLOBIN 14.1 12.6* 12.3*   HEMATOCRIT 44.3 40.0* 38.5*   MCV 89.3 89.5 88.3   MCH 28.4 28.2 28.2   MCHC 31.8* 31.5* 31.9*   RDW 46.2 45.2 44.0   PLATELETCT 414 297 278   MPV 9.5 9.7 9.8     Recent Labs     03/01/20  1514 03/02/20  0432 03/03/20  0416   SODIUM 135 135 137   POTASSIUM 4.0 4.3 3.7   CHLORIDE 101 100 101   CO2 24 26 28   GLUCOSE 134* 110* 101*   BUN 23* 27* 29*   CREATININE 1.11 1.31 1.06   CALCIUM 9.1 9.0 9.0                   Imaging  CT-HEAD W/O   Final Result      No acute intracranial abnormality is identified.      Stable severe white matter disease      DX-CHEST-PORTABLE (1 VIEW)   Final Result         1. No acute cardiopulmonary abnormalities are identified.      MR-BRAIN-W/O    (Results Pending)        Assessment/Plan  * Weakness- (present on admission)  Assessment & Plan  -And vision changes  -Reports inability to ambulate  -PT OT -recommend SNF  -Fall precautions      ANA (acute kidney injury) (HCC)- (present on admission)  Assessment & Plan  -Resolved    Vision changes  Assessment & Plan  -\"things just go dark\"  -recently seen by his, chest as an outpatient  -I've ordered MRI    HTN (hypertension)  Assessment & Plan  -Stable  -Continue home lisinopril and prazosin    HLD (hyperlipidemia)- (present on admission)  Assessment & Plan  -Continue home fenofibrate and aspirin.    Diabetes mellitus type 2 in obese (HCC)- (present on admission)  Assessment & Plan  -A1c 7.8 December 2019  -Continue home metformin  -Continue Lantus  -Accu-Cheks AC at bedtime with SSI -105-179 over the past 24 hours  -Diabetic diet  -Hypoglycemia protocol    History of seizure- (present on admission)  Assessment & Plan  -No witnessed seizures  -Continue home Depakote      Psychiatric problem- (present on admission)  Assessment & Plan  -per psych " "consult on 3/1 \"Major depressive disorder versus persistent depressive disorder\"  -continue home medications Depakote, Buspar and Zoloft  -OP FU with Neurology and Psychiatry          Acquired hypothyroidism- (present on admission)  Assessment & Plan  -TSH WNL  -Continue home levothyroxine       VTE prophylaxis: SCD    Please note that this dictation was created using voice recognition software. I have made every reasonable attempt to correct obvious errors, but there may be errors of grammar and possibly content that I did not discover before finalizing the note.    Lamar Diehl A.P.R.N.        "

## 2020-03-03 NOTE — CARE PLAN
Problem: Communication  Goal: The ability to communicate needs accurately and effectively will improve  Outcome: PROGRESSING AS EXPECTED  Note: Patient able to make needs known; patient encouraged to voice feelings; therapeutic communication utilized; call light within reach.        Problem: Safety  Goal: Will remain free from injury  Outcome: PROGRESSING AS EXPECTED  Note: Universal fall precautions in place; bed in low/locked position; call light in reach; room free of clutter; mobility assessed and limitations dicussed with patient; instructed to call for assistance

## 2020-03-04 ENCOUNTER — PATIENT OUTREACH (OUTPATIENT)
Dept: HEALTH INFORMATION MANAGEMENT | Facility: OTHER | Age: 40
End: 2020-03-04

## 2020-03-04 VITALS
BODY MASS INDEX: 32.45 KG/M2 | HEIGHT: 78 IN | SYSTOLIC BLOOD PRESSURE: 111 MMHG | RESPIRATION RATE: 16 BRPM | OXYGEN SATURATION: 92 % | TEMPERATURE: 97.9 F | DIASTOLIC BLOOD PRESSURE: 61 MMHG | HEART RATE: 60 BPM | WEIGHT: 280.43 LBS

## 2020-03-04 LAB
ALBUMIN SERPL BCP-MCNC: 3.7 G/DL (ref 3.2–4.9)
BUN SERPL-MCNC: 27 MG/DL (ref 8–22)
CALCIUM SERPL-MCNC: 8.8 MG/DL (ref 8.5–10.5)
CHLORIDE SERPL-SCNC: 100 MMOL/L (ref 96–112)
CO2 SERPL-SCNC: 29 MMOL/L (ref 20–33)
CREAT SERPL-MCNC: 1.14 MG/DL (ref 0.5–1.4)
GLUCOSE BLD-MCNC: 95 MG/DL (ref 65–99)
GLUCOSE SERPL-MCNC: 100 MG/DL (ref 65–99)
PHOSPHATE SERPL-MCNC: 4 MG/DL (ref 2.5–4.5)
POTASSIUM SERPL-SCNC: 4 MMOL/L (ref 3.6–5.5)
SODIUM SERPL-SCNC: 137 MMOL/L (ref 135–145)

## 2020-03-04 PROCEDURE — 36415 COLL VENOUS BLD VENIPUNCTURE: CPT

## 2020-03-04 PROCEDURE — 700102 HCHG RX REV CODE 250 W/ 637 OVERRIDE(OP): Performed by: HOSPITALIST

## 2020-03-04 PROCEDURE — 90686 IIV4 VACC NO PRSV 0.5 ML IM: CPT | Performed by: HOSPITALIST

## 2020-03-04 PROCEDURE — 99239 HOSP IP/OBS DSCHRG MGMT >30: CPT | Performed by: HOSPITALIST

## 2020-03-04 PROCEDURE — 80069 RENAL FUNCTION PANEL: CPT

## 2020-03-04 PROCEDURE — 90471 IMMUNIZATION ADMIN: CPT

## 2020-03-04 PROCEDURE — A9270 NON-COVERED ITEM OR SERVICE: HCPCS | Performed by: HOSPITALIST

## 2020-03-04 PROCEDURE — 3E02340 INTRODUCTION OF INFLUENZA VACCINE INTO MUSCLE, PERCUTANEOUS APPROACH: ICD-10-PCS | Performed by: HOSPITALIST

## 2020-03-04 PROCEDURE — 700111 HCHG RX REV CODE 636 W/ 250 OVERRIDE (IP): Performed by: HOSPITALIST

## 2020-03-04 PROCEDURE — 82962 GLUCOSE BLOOD TEST: CPT

## 2020-03-04 RX ADMIN — FENOFIBRATE 134 MG: 134 CAPSULE ORAL at 05:02

## 2020-03-04 RX ADMIN — LEVOTHYROXINE SODIUM 200 MCG: 200 TABLET ORAL at 05:02

## 2020-03-04 RX ADMIN — METFORMIN HYDROCHLORIDE 1000 MG: 500 TABLET ORAL at 07:55

## 2020-03-04 RX ADMIN — MONTELUKAST 10 MG: 10 TABLET, FILM COATED ORAL at 05:01

## 2020-03-04 RX ADMIN — ASPIRIN 81 MG: 81 TABLET, COATED ORAL at 05:02

## 2020-03-04 RX ADMIN — INFLUENZA A VIRUS A/BRISBANE/02/2018 IVR-190 (H1N1) ANTIGEN (FORMALDEHYDE INACTIVATED), INFLUENZA A VIRUS A/KANSAS/14/2017 X-327 (H3N2) ANTIGEN (FORMALDEHYDE INACTIVATED), INFLUENZA B VIRUS B/PHUKET/3073/2013 ANTIGEN (FORMALDEHYDE INACTIVATED), AND INFLUENZA B VIRUS B/MARYLAND/15/2016 BX-69A ANTIGEN (FORMALDEHYDE INACTIVATED) 0.5 ML: 15; 15; 15; 15 INJECTION, SUSPENSION INTRAMUSCULAR at 10:07

## 2020-03-04 RX ADMIN — LISINOPRIL 10 MG: 10 TABLET ORAL at 05:02

## 2020-03-04 RX ADMIN — SERTRALINE HYDROCHLORIDE 100 MG: 100 TABLET ORAL at 05:02

## 2020-03-04 RX ADMIN — BUSPIRONE HYDROCHLORIDE 10 MG: 10 TABLET ORAL at 05:02

## 2020-03-04 RX ADMIN — DIVALPROEX SODIUM 500 MG: 500 TABLET, DELAYED RELEASE ORAL at 05:02

## 2020-03-04 RX ADMIN — FAMOTIDINE 20 MG: 20 TABLET ORAL at 05:02

## 2020-03-04 NOTE — CARE PLAN
Problem: Communication  Goal: The ability to communicate needs accurately and effectively will improve  Outcome: PROGRESSING AS EXPECTED     Problem: Safety  Goal: Will remain free from injury  Outcome: PROGRESSING AS EXPECTED      slipping, stumbling. tripping

## 2020-03-04 NOTE — DISCHARGE PLANNING
Anticipated Discharge Disposition:   [facility]    Action:    Pt agreeable to transfer today to [facility].  Cobra signed by patient.    Tacho signed by Dr. Marquez.    DC packet given to bedside RN Jessica.    Barriers to Discharge:    None    Plan:    DC

## 2020-03-04 NOTE — DISCHARGE INSTRUCTIONS
Discharge Instructions    Discharged to other by medical transportation with escort. Discharged via wheelchair, hospital escort: Yes.  Special equipment needed: Not Applicable    Be sure to schedule a follow-up appointment with your primary care doctor or any specialists as instructed.     Discharge Plan:   Diet Plan: Discussed  Activity Level: Discussed  Smoking Cessation Offered: Patient Refused  Confirmed Follow up Appointment: Patient to Call and Schedule Appointment  Confirmed Symptoms Management: Discussed  Medication Reconciliation Updated: Yes    I understand that a diet low in cholesterol, fat, and sodium is recommended for good health. Unless I have been given specific instructions below for another diet, I accept this instruction as my diet prescription.   Other diet: Diabetic      Special Instructions: None    · Is patient discharged on Warfarin / Coumadin?   No     Depression / Suicide Risk    As you are [facility], it is important to learn how to keep safe from harming yourself.    Recognize the warning signs:  · Abrupt changes in personality, positive or negative- including increase in energy   · Giving away possessions  · Change in eating patterns- significant weight changes-  positive or negative  · Change in sleeping patterns- unable to sleep or sleeping all the time   · Unwillingness or inability to communicate  · Depression  · Unusual sadness, discouragement and loneliness  · Talk of wanting to die  · Neglect of personal appearance   · Rebelliousness- reckless behavior  · Withdrawal from people/activities they love  · Confusion- inability to concentrate     If you or a loved one observes any of these behaviors or has concerns about self-harm, here's what you can do:  · Talk about it- your feelings and reasons for harming yourself  · Remove any means that you might use to hurt yourself (examples: pills, rope, extension cords, firearm)  · Get professional help from  the community (Mental Health, Substance Abuse, psychological counseling)  · Do not be alone:Call your Safe Contact- someone whom you trust who will be there for you.  · Call your local CRISIS HOTLINE 501-7557 or 918-042-2784  · Call your local Children's Mobile Crisis Response Team [place] (618) 922-8836 or www.[employer]  · Call the toll free National Suicide Prevention Hotlines   · National Suicide Prevention Lifeline 839-494-XTFR (3285)  · National Hope Line Network 800-SUICIDE (570-6958)

## 2020-03-04 NOTE — PROGRESS NOTES
Monitor summary: SR/SB 52-70, AK 0.20, QRS 0.10, QT 0.38, with rare PACs per strip from monitor room.

## 2020-03-04 NOTE — CARE PLAN
Problem: Safety  Goal: Will remain free from injury  Outcome: PROGRESSING AS EXPECTED     Problem: Infection  Goal: Will remain free from infection  Outcome: PROGRESSING AS EXPECTED     Problem: Knowledge Deficit  Goal: Knowledge of disease process/condition, treatment plan, diagnostic tests, and medications will improve  Outcome: PROGRESSING AS EXPECTED  Note: POC discussed regarding MRI and waiting for results and then pending placement; patient verbalizes understanding; questions encouraged and answered as appropriate.

## 2020-03-04 NOTE — PROGRESS NOTES
Aox4. TOLBERT with generalized weakness. Voiding. Denies pain.     Report [facility] to Ana ANNE at [facility]

## 2020-03-04 NOTE — DISCHARGE SUMMARY
"Discharge Summary    CHIEF COMPLAINT ON ADMISSION  Chief Complaint   Patient presents with   • Weakness     Generalized weakness    • Visual Problems     \"My vision started going dark\" onset at 1230       Reason for Admission  VISON PROBLEMS;WEAKNESS     CODE STATUS  Full Code    HPI & HOSPITAL COURSE  Mr. Prabhakar is a 39-year-old male with PMH significant for hypothyroidism, DM 2, seizure disorder, HLD, HTN and PTSD/psych disorder currently on medications who presented 2/29/2020 with weakness and vision changes.  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 Work-up including MELAS and CADASIL were obtained. Patient was also treated for UTI. He was diagnosed with situational anxiety and sent back to his assisted living facility.  He complained of feeling paralyzed and unable to ambulate.    CT head was unremarkable. MRI brain was unchanged from previous scan done 12/2019.  He was evaluated by psychiatry on 3/1/2020 who recommended continuing his home medications of Depakote, BuSpar and Zoloft.   He was seen and examined prior to discharge.  He reports his vision still is waxing and waning \"and goes dark\" at times, but improving.  He continues to complain of generalized weakness and dizziness during activity.  He was evaluated by PT/OT who recommended he would benefit from ongoing therapy at skilled nursing facility.  Patient is in agreement with this plan and will discharge to [facility] this morning.    Therefore, he is discharged in good and stable condition to skilled nursing facility.    The patient met 2-midnight criteria for an inpatient stay at the time of discharge.      FOLLOW UP ITEMS POST DISCHARGE  Per SNF    DISCHARGE DIAGNOSES  Principal Problem:    Weakness POA: Yes  Active Problems:    ANA (acute kidney injury) (HCC) POA: Yes    HLD (hyperlipidemia) POA: Yes    HTN " (hypertension) POA: Yes    Vision changes POA: Yes    Acquired hypothyroidism POA: Yes    Psychiatric problem POA: Yes      Overview: PTSD    History of seizure POA: Yes    Diabetes mellitus type 2 in obese (HCC) POA: Yes  Resolved Problems:    * No resolved hospital problems. *      FOLLOW UP  Future Appointments   Date Time Provider Department Center   5/5/2020 12:40 PM Daniela Franco M.D. [facility] None     [address] [address] [phone]    Please call to establish with a Primary Care Physician and schedule your hospital follow up. Thank you         MEDICATIONS ON DISCHARGE     Medication List      CONTINUE taking these medications      Instructions   aspirin EC 81 MG Tbec  Commonly known as:  ECOTRIN   Take 81 mg by mouth every day.  Dose:  81 mg     atorvastatin 80 MG tablet  Commonly known as:  LIPITOR   Take 80 mg by mouth every evening.  Dose:  80 mg     busPIRone 10 MG Tabs tablet  Commonly known as:  BUSPAR   Take 10 mg by mouth 3 times a day.  Dose:  10 mg     divalproex 500 MG Tbec  Commonly known as:  DEPAKOTE   Take 500-1,500 mg by mouth 2 Times a Day. 500mg in AM  1500mg at PM  Dose:  500-1,500 mg     fenofibrate 130 MG capsule  Commonly known as:  ANTARA   Take 130 mg by mouth every morning.  Dose:  130 mg     glimepiride 4 MG Tabs  Commonly known as:  AMARYL   Take 4 mg by mouth every morning.  Dose:  4 mg     insulin glargine 100 UNIT/ML Sopn injection  Generic drug:  insulin glargine   Inject 20 Units as instructed every evening.  Dose:  20 Units     Jardiance 25 MG Tabs  Generic drug:  Empagliflozin   Take 25 mg by mouth every day.  Dose:  25 mg     levothyroxine 200 MCG Tabs  Commonly known as:  SYNTHROID   Take 200 mcg by mouth Every morning on an empty stomach.  Dose:  200 mcg     lisinopril 10 MG Tabs  Commonly known as:  PRINIVIL   Take 10 mg by mouth every day.  Dose:  10 mg     metformin 1000 MG tablet  Commonly known as:  GLUCOPHAGE    "Take 1,000 mg by mouth 2 times a day.  Dose:  1,000 mg     montelukast 10 MG Tabs  Commonly known as:  SINGULAIR   Take 10 mg by mouth every day.  Dose:  10 mg     prazosin 2 MG Caps  Commonly known as:  MINIPRESS   Take 2 mg by mouth every evening.  Dose:  2 mg     raNITidine 150 MG Tabs  Commonly known as:  ZANTAC   Take 150 mg by mouth 2 times a day.  Dose:  150 mg     sertraline 100 MG Tabs  Commonly known as:  ZOLOFT   Take 100 mg by mouth every day.  Dose:  100 mg     travoprost 0.004 % Soln  Commonly known as:  TRAVATAN Z   Place 1 Drop in both eyes every evening.  Dose:  1 Drop     vitamin D 2000 UNIT Tabs   Take 2,000 Units by mouth every day.  Dose:  2,000 Units     Xalatan 0.005 % Soln  Generic drug:  latanoprost   Place 1 Drop in both eyes every evening.  Dose:  1 Drop            Allergies  Allergies   Allergen Reactions   • Abilify Unspecified     \"Feeling tired, like I don't even know whats going on around me\"   • Fish      Pt reports fish causes him to be sick to his stomach         DIET  Orders Placed This Encounter   Procedures   • Diet Order Diabetic     Standing Status:   Standing     Number of Occurrences:   1     Order Specific Question:   Diet:     Answer:   Diabetic [3]       ACTIVITY  As tolerated and directed by skilled nursing.  Weight bearing as tolerated    LINES, DRAINS, AND WOUNDS  This is an automated list. Peripheral IVs will be removed prior to discharge.  Peripheral IV 02/29/20 20 G Right Antecubital (Active)   Site Assessment Clean;Dry;Intact 3/3/2020  8:00 PM   Dressing Type Transparent;Occlusive 3/3/2020  8:00 PM   Line Status Saline locked 3/3/2020  8:00 PM   Dressing Status Dry;Intact;Old drainage 3/3/2020  8:00 PM   Dressing Intervention N/A 3/3/2020  8:00 PM   Date Primary Tubing Changed 03/01/20 3/1/2020  2:15 AM   Infiltration Grading ([facility] 0 3/3/2020  8:00 PM   Phlebitis Scale ([facility]) 0 3/3/2020  8:00 PM          Peripheral IV 02/29/20 20 G Right Antecubital " (Active)   Site Assessment Clean;Dry;Intact 3/3/2020  8:00 PM   Dressing Type Transparent;Occlusive 3/3/2020  8:00 PM   Line Status Saline locked 3/3/2020  8:00 PM   Dressing Status Dry;Intact;Old drainage 3/3/2020  8:00 PM   Dressing Intervention N/A 3/3/2020  8:00 PM   Date Primary Tubing Changed 03/01/20 3/1/2020  2:15 AM   [facility] 0 3/3/2020  8:00 PM   Phlebitis Scale ([facility] Only) 0 3/3/2020  8:00 PM               MENTAL STATUS ON TRANSFER  Level of Consciousness: Alert  Orientation : Oriented x 4  Speech: Speech Clear    CONSULTATIONS  None    PROCEDURES  None    LABORATORY  Lab Results   Component Value Date    SODIUM 137 03/04/2020    POTASSIUM 4.0 03/04/2020    CHLORIDE 100 03/04/2020    CO2 29 03/04/2020    GLUCOSE 100 (H) 03/04/2020    BUN 27 (H) 03/04/2020    CREATININE 1.14 03/04/2020        Lab Results   Component Value Date    WBC 9.5 03/03/2020    HEMOGLOBIN 12.3 (L) 03/03/2020    HEMATOCRIT 38.5 (L) 03/03/2020    PLATELETCT 278 03/03/2020        Total time of the discharge process exceeds 38 minutes.    Please note that this dictation was created using voice recognition software. I have made every reasonable attempt to correct obvious errors, but there may be errors of grammar and possibly content that I did not discover before finalizing the note.    DANY Sánchez.

## 2020-03-22 ENCOUNTER — APPOINTMENT (OUTPATIENT)
Dept: RADIOLOGY | Facility: MEDICAL CENTER | Age: 40
End: 2020-03-22
Attending: EMERGENCY MEDICINE
Payer: MEDICAID

## 2020-03-22 ENCOUNTER — HOSPITAL ENCOUNTER (EMERGENCY)
Facility: MEDICAL CENTER | Age: 40
End: 2020-03-22
Attending: EMERGENCY MEDICINE
Payer: MEDICAID

## 2020-03-22 VITALS
SYSTOLIC BLOOD PRESSURE: 122 MMHG | OXYGEN SATURATION: 95 % | HEART RATE: 76 BPM | HEIGHT: 78 IN | RESPIRATION RATE: 16 BRPM | WEIGHT: 284.83 LBS | BODY MASS INDEX: 32.96 KG/M2 | DIASTOLIC BLOOD PRESSURE: 74 MMHG | TEMPERATURE: 97.3 F

## 2020-03-22 DIAGNOSIS — E04.1 THYROID NODULE: ICD-10-CM

## 2020-03-22 LAB
ANION GAP SERPL CALC-SCNC: 15 MMOL/L (ref 7–16)
BASOPHILS # BLD AUTO: 0.9 % (ref 0–1.8)
BASOPHILS # BLD: 0.08 K/UL (ref 0–0.12)
BUN SERPL-MCNC: 26 MG/DL (ref 8–22)
CALCIUM SERPL-MCNC: 9.6 MG/DL (ref 8.5–10.5)
CHLORIDE SERPL-SCNC: 103 MMOL/L (ref 96–112)
CO2 SERPL-SCNC: 20 MMOL/L (ref 20–33)
CREAT SERPL-MCNC: 1.08 MG/DL (ref 0.5–1.4)
EOSINOPHIL # BLD AUTO: 0.11 K/UL (ref 0–0.51)
EOSINOPHIL NFR BLD: 1.2 % (ref 0–6.9)
ERYTHROCYTE [DISTWIDTH] IN BLOOD BY AUTOMATED COUNT: 43 FL (ref 35.9–50)
GLUCOSE SERPL-MCNC: 186 MG/DL (ref 65–99)
HCT VFR BLD AUTO: 40 % (ref 42–52)
HGB BLD-MCNC: 13.3 G/DL (ref 14–18)
IMM GRANULOCYTES # BLD AUTO: 0.15 K/UL (ref 0–0.11)
IMM GRANULOCYTES NFR BLD AUTO: 1.6 % (ref 0–0.9)
LYMPHOCYTES # BLD AUTO: 2.78 K/UL (ref 1–4.8)
LYMPHOCYTES NFR BLD: 30.3 % (ref 22–41)
MCH RBC QN AUTO: 28.4 PG (ref 27–33)
MCHC RBC AUTO-ENTMCNC: 33.3 G/DL (ref 33.7–35.3)
MCV RBC AUTO: 85.5 FL (ref 81.4–97.8)
MONOCYTES # BLD AUTO: 1.08 K/UL (ref 0–0.85)
MONOCYTES NFR BLD AUTO: 11.8 % (ref 0–13.4)
NEUTROPHILS # BLD AUTO: 4.99 K/UL (ref 1.82–7.42)
NEUTROPHILS NFR BLD: 54.2 % (ref 44–72)
NRBC # BLD AUTO: 0 K/UL
NRBC BLD-RTO: 0 /100 WBC
PLATELET # BLD AUTO: 346 K/UL (ref 164–446)
PMV BLD AUTO: 9.9 FL (ref 9–12.9)
POTASSIUM SERPL-SCNC: 3.7 MMOL/L (ref 3.6–5.5)
RBC # BLD AUTO: 4.68 M/UL (ref 4.7–6.1)
SODIUM SERPL-SCNC: 138 MMOL/L (ref 135–145)
T4 FREE SERPL-MCNC: 0.94 NG/DL (ref 0.53–1.43)
TSH SERPL DL<=0.005 MIU/L-ACNC: 3.78 UIU/ML (ref 0.38–5.33)
WBC # BLD AUTO: 9.2 K/UL (ref 4.8–10.8)

## 2020-03-22 PROCEDURE — 99284 EMERGENCY DEPT VISIT MOD MDM: CPT

## 2020-03-22 PROCEDURE — 84443 ASSAY THYROID STIM HORMONE: CPT

## 2020-03-22 PROCEDURE — 84439 ASSAY OF FREE THYROXINE: CPT

## 2020-03-22 PROCEDURE — 80048 BASIC METABOLIC PNL TOTAL CA: CPT

## 2020-03-22 PROCEDURE — 36415 COLL VENOUS BLD VENIPUNCTURE: CPT

## 2020-03-22 PROCEDURE — 85025 COMPLETE CBC W/AUTO DIFF WBC: CPT

## 2020-03-22 PROCEDURE — 70491 CT SOFT TISSUE NECK W/DYE: CPT

## 2020-03-22 PROCEDURE — 700117 HCHG RX CONTRAST REV CODE 255: Performed by: EMERGENCY MEDICINE

## 2020-03-22 RX ADMIN — IOHEXOL 80 ML: 350 INJECTION, SOLUTION INTRAVENOUS at 17:42

## 2020-03-22 ASSESSMENT — FIBROSIS 4 INDEX: FIB4 SCORE: 0.64

## 2020-03-22 NOTE — ED TRIAGE NOTES
".  Chief Complaint   Patient presents with   • Oral Swelling     difficulty swallowing. 4 days    pt concerned his thyroid is causing difficulty swallowing. Has not been able to take medications x 3 days. Has \"lump\" to throat that has gotten larger per pt.     "

## 2020-03-22 NOTE — ED PROVIDER NOTES
"ED Provider Note    Scribed for Gretchen Miller M.D. by Chuck Dawson. 3/22/2020, 4:40 PM.    Primary care provider: ANIKET Davis  Means of arrival: Walk-in  History obtained from: Patient  History limited by: None    CHIEF COMPLAINT  Chief Complaint   Patient presents with   • Oral Swelling     difficulty swallowing. 4 days        HPI  Norm Prabhakar is a 39 y.o. male who presents to the Emergency Department for throat pain onset 3-4 days ago. Patient states that \"he has problems with his thyroid gland that is making it hard to swallow my medications\". There are no known alleviating or exacerbating factors.  He has a history of thyroid issues and takes levothyroxine and follows with his PCP, Krystin Gupta. Patient notes that he was referred to a Endocrinologist at [facility], but states that he has been unable to see them.     REVIEW OF SYSTEMS  Review of Systems   HENT:        Positive for throat pain.   All other systems reviewed and are negative.        PAST MEDICAL HISTORY   has a past medical history of Anxiety, ASTHMA, Bipolar 1 disorder ([facility]), Depression, Diabetes ([facility]), Fall, Glaucoma, Glaucoma (), Hypothyroidism, Indigestion, Mental disorder, Murmur, Pneumonia, Psychiatric problem (), S/P thyroidectomy, Seizure ([facility]) (), Seizure disorder ([facility]), and Unspecified disorder of thyroid.    SURGICAL HISTORY   has a past surgical history that includes eye surgery; thyroid lobectomy; and other.    SOCIAL HISTORY  Social History     Tobacco Use   • Smoking status: Current Some Day Smoker     Packs/day: 0.25     Types: Cigarettes, Cigars     Last attempt to quit: 2018     Years since quittin.8   • Smokeless tobacco: Never Used   Substance Use Topics   • Alcohol use: Not Currently     Frequency: 4 or more times a week     Drinks per session: 3 or 4     Binge frequency: Daily or almost daily   • Drug use: Yes     Types: Inhaled     Comment: marijuana      Social History     Substance and " "Sexual Activity   Drug Use Yes   • Types: Inhaled    Comment: marijuana       FAMILY HISTORY  Family History   Problem Relation Age of Onset   • Hypertension Mother    • Heart Disease Mother    • Lung Disease Mother    • Stroke Maternal Grandmother        CURRENT MEDICATIONS  Home Medications     Reviewed by Amee Mcknight R.N. (Registered Nurse) on 03/22/20 at 1625  Med List Status: Partial   Medication Last Dose Status   aspirin EC (ECOTRIN) 81 MG Tablet Delayed Response  Active   atorvastatin (LIPITOR) 80 MG tablet  Active   busPIRone (BUSPAR) 10 MG Tab tablet  Active   Cholecalciferol (VITAMIN D) 2000 UNIT Tab  Active   divalproex (DEPAKOTE) 500 MG Tablet Delayed Response  Active   Empagliflozin (JARDIANCE) 25 MG Tab  Active   fenofibrate (ANTARA) 130 MG capsule  Active   glimepiride (AMARYL) 4 MG Tab  Active   insulin glargine (BASAGLAR KWIKPEN) 100 UNIT/ML Solution Pen-injector injection  Active   latanoprost (XALATAN) 0.005 % Solution  Active   levothyroxine (SYNTHROID) 200 MCG Tab  Active   lisinopril (PRINIVIL) 10 MG Tab  Active   metformin (GLUCOPHAGE) 1000 MG tablet  Active   montelukast (SINGULAIR) 10 MG Tab  Active   prazosin (MINIPRESS) 2 MG Cap  Active   raNITidine (ZANTAC) 150 MG Tab  Active   sertraline (ZOLOFT) 100 MG Tab  Active   travoprost (TRAVATAN Z) 0.004 % Solution  Active                ALLERGIES  Allergies   Allergen Reactions   • Abilify Unspecified     \"Feeling tired, like I don't even know whats going on around me\"   • Fish      Pt reports fish causes him to be sick to his stomach         PHYSICAL EXAM  VITAL SIGNS: /84   Pulse (!) 103   Temp 36.3 °C (97.3 °F) (Oral)   Resp 16   Ht 1.981 m (6' 6\")   Wt (!) 129.2 kg (284 lb 13.4 oz)   SpO2 94%   BMI 32.92 kg/m²   Vitals reviewed by myself.  Nursing note and vitals reviewed.  Constitutional: Well-developed and well-nourished. No acute distress.   HENT: Head is normocephalic and atraumatic.  Patient is managing secretions " without difficulty, posterior oropharynx is pink and not inflamed.  No tongue swelling, no posterior oropharynx swelling  Neck: mild swelling to anterior neck over thyroid gland, no overlying erythema  Eyes: extra-ocular movements intact  Cardiovascular: tachycardic rate and  regular rhythm. No murmur heard.  Pulmonary/Chest: Breath sounds normal. No wheezes or rales. No stridor.  Musculoskeletal: Extremities exhibit normal range of motion without edema or tenderness.   Neurological: Awake and alert  Skin: Skin is warm and dry. No rash.     I verified that the patient was wearing a mask and I was wearing appropriate PPE every time I entered the room. The patient's mask was on the patient at all times during my encounter except for a brief view of the oropharynx.      DIAGNOSTIC STUDIES /  LABS  Labs Reviewed   CBC WITH DIFFERENTIAL - Abnormal; Notable for the following components:       Result Value    RBC 4.68 (*)     Hemoglobin 13.3 (*)     Hematocrit 40.0 (*)     MCHC 33.3 (*)     Immature Granulocytes 1.60 (*)     Monos (Absolute) 1.08 (*)     Immature Granulocytes (abs) 0.15 (*)     All other components within normal limits   BASIC METABOLIC PANEL - Abnormal; Notable for the following components:    Glucose 186 (*)     Bun 26 (*)     All other components within normal limits   TSH   FREE THYROXINE   ESTIMATED GFR   FREE THYROXINE       All labs reviewed by me.      RADIOLOGY  CT-SOFT TISSUE NECK WITH   Final Result      1.  Again seen enhancing nodules involving the anterior neck to the right of midline which are unchanged from multiple comparison studies and are somewhat hyperdense in nature paralleling the expected density of thyroid tissue. This again likely    represents ectopic thyroid tissue and are stable.      2.  No evidence of pharyngeal or laryngeal mass or cervical adenopathy.        The radiologist's interpretation of all radiological studies have been reviewed by me.      REASSESSMENT  4:40 PM -  Patient seen and examined at bedside. Discussed plan of care, including. Patient agrees to the plan of care. Ordered for CT-Soft tissue neck w/, CBC with diff, BMP, TSH, and free thyroxine to evaluate his symptoms.     6:54 PM - Patient was reevaluated at bedside. Discussed lab and radiology results with the patient and informed them that they are reassuring. Return precautions were discussed with the patient, and they were cleared for discharge at this time. Patient was understanding and agreeable to discharge.     COURSE & MEDICAL DECISION MAKING  Nursing notes, VS, PMSFHx reviewed in chart.    Patient is a 39-year-old male who comes in for evaluation of neck swelling.  On physical exam patient is well-appearing and swallowing his secretions without difficulty.  Differential diagnosis includes thyroid nodule, mass-effect on airway, infection, abscess.  Diagnostic work-up includes labs and CT of the neck.    Patient's initial vitals notable for slight tachycardia, otherwise he is well-appearing.  Again he is managing his secretions without difficulty and is not hypoxic.  Labs returned and are unremarkable, white count is within normal limits.  Patient CT demonstrates enhancing nodules in the anterior neck which are unchanged from multiple prior studies over the years.  Thyroid functions are within normal limits.  Therefore patient is reassured and advised that he will need to follow-up with an endocrinologist.  He is agreeable to this plan.  Patient is then discharged in stable condition.    The patient will return for new or worsening symptoms and is stable at the time of discharge.    The patient is referred to a primary physician for blood pressure management, diabetic screening, and for all other preventative health concerns.    DISPOSITION:  Patient will be discharged home in stable condition.    FOLLOW UP:  ANIKET Davis  [address] [place] [id number] [phone]          [facility]  [facility], Emergency Dept  [address] [facility] [id number] [phone]    Return at any point for further evaluation      FINAL IMPRESSION  1. Syncope, unspecified syncope type          I, Chuck Dawson (Scribe), am scribing for, and in the presence of, Gretchen Miller M.D..    Electronically signed by: Chuck Dawson (Scribe), 3/22/2020    I, Gretchen Miller M.D. personally performed the services described in this documentation, as scribed by Chuck Dawson in my presence, and it is both accurate and complete.    C.    The note accurately reflects work and decisions made by me.  Gretchen Miller M.D.  3/22/2020  7:26 PM

## 2020-03-23 NOTE — ED NOTES
PIV removed.  Patient given discharge instructions and follow up information, verbalized understanding.  VSS on RA.  Patient managing secretions, no respiratory distress noted, given water, swallowing water without difficulty.

## 2020-05-07 ENCOUNTER — OFFICE VISIT (OUTPATIENT)
Dept: NEUROLOGY | Facility: MEDICAL CENTER | Age: 40
End: 2020-05-07
Payer: MEDICAID

## 2020-05-07 VITALS
TEMPERATURE: 97.5 F | HEART RATE: 88 BPM | WEIGHT: 299 LBS | RESPIRATION RATE: 18 BRPM | OXYGEN SATURATION: 94 % | HEIGHT: 78 IN | SYSTOLIC BLOOD PRESSURE: 118 MMHG | BODY MASS INDEX: 34.59 KG/M2 | DIASTOLIC BLOOD PRESSURE: 70 MMHG

## 2020-05-07 DIAGNOSIS — R56.9 SEIZURES (HCC): ICD-10-CM

## 2020-05-07 DIAGNOSIS — G81.91 RIGHT HEMIPARESIS (HCC): ICD-10-CM

## 2020-05-07 PROCEDURE — 99214 OFFICE O/P EST MOD 30 MIN: CPT | Performed by: PSYCHIATRY & NEUROLOGY

## 2020-05-07 RX ORDER — LURASIDONE HYDROCHLORIDE 20 MG/1
20 TABLET, FILM COATED ORAL
COMMUNITY
Start: 2020-04-09 | End: 2021-02-03

## 2020-05-07 RX ORDER — HYDROXYZINE HYDROCHLORIDE 25 MG/1
25 TABLET, FILM COATED ORAL 2 TIMES DAILY PRN
COMMUNITY
Start: 2020-04-09 | End: 2020-10-16

## 2020-05-07 RX ORDER — ALPRAZOLAM 0.25 MG/1
0.25 TABLET ORAL
COMMUNITY
Start: 2020-04-09 | End: 2020-10-16

## 2020-05-07 RX ORDER — TRAZODONE HYDROCHLORIDE 100 MG/1
100 TABLET ORAL NIGHTLY PRN
COMMUNITY
Start: 2020-04-09 | End: 2021-02-03

## 2020-05-07 RX ORDER — OMEPRAZOLE 20 MG/1
20 CAPSULE, DELAYED RELEASE ORAL 2 TIMES DAILY
COMMUNITY
Start: 2020-04-24 | End: 2020-08-31

## 2020-05-07 RX ORDER — FAMOTIDINE 10 MG/1
TABLET ORAL
COMMUNITY
Start: 2020-05-01 | End: 2020-05-07

## 2020-05-07 ASSESSMENT — ENCOUNTER SYMPTOMS
ABDOMINAL PAIN: 0
HALLUCINATIONS: 0
SHORTNESS OF BREATH: 0
FEVER: 0
SORE THROAT: 0
WEIGHT LOSS: 0
FALLS: 0

## 2020-05-07 ASSESSMENT — FIBROSIS 4 INDEX: FIB4 SCORE: 0.52
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
## 2020-05-13 ENCOUNTER — DOCUMENTATION (OUTPATIENT)
Dept: NEUROLOGY | Facility: MEDICAL CENTER | Age: 40
End: 2020-05-13

## 2020-05-13 NOTE — PROGRESS NOTES
Reached out to patient's mother Angeli with regards to further information about his clinical history.  Patient is a poor historian.  He previously gave me permission to discuss his medical work-up and neurological evaluation with his mom.  Message left.  Will wait for return phone call.

## 2020-06-20 ENCOUNTER — APPOINTMENT (OUTPATIENT)
Dept: RADIOLOGY | Facility: MEDICAL CENTER | Age: 40
DRG: 880 | End: 2020-06-20
Attending: EMERGENCY MEDICINE
Payer: MEDICAID

## 2020-06-20 ENCOUNTER — HOSPITAL ENCOUNTER (INPATIENT)
Facility: MEDICAL CENTER | Age: 40
LOS: 2 days | DRG: 880 | End: 2020-06-22
Attending: EMERGENCY MEDICINE | Admitting: HOSPITALIST
Payer: MEDICAID

## 2020-06-20 ENCOUNTER — APPOINTMENT (OUTPATIENT)
Dept: CARDIOLOGY | Facility: MEDICAL CENTER | Age: 40
DRG: 880 | End: 2020-06-20
Attending: HOSPITALIST
Payer: MEDICAID

## 2020-06-20 DIAGNOSIS — R29.90 STROKE-LIKE SYMPTOM: ICD-10-CM

## 2020-06-20 LAB
ABO GROUP BLD: NORMAL
ALBUMIN SERPL BCP-MCNC: 4.2 G/DL (ref 3.2–4.9)
ALBUMIN/GLOB SERPL: 1.9 G/DL
ALP SERPL-CCNC: 50 U/L (ref 30–99)
ALT SERPL-CCNC: 16 U/L (ref 2–50)
ANION GAP SERPL CALC-SCNC: 14 MMOL/L (ref 7–16)
ANISOCYTOSIS BLD QL SMEAR: ABNORMAL
APTT PPP: 27.6 SEC (ref 24.7–36)
AST SERPL-CCNC: 22 U/L (ref 12–45)
BASOPHILS # BLD AUTO: 0.9 % (ref 0–1.8)
BASOPHILS # BLD: 0.08 K/UL (ref 0–0.12)
BILIRUB SERPL-MCNC: 0.3 MG/DL (ref 0.1–1.5)
BLD GP AB SCN SERPL QL: NORMAL
BUN SERPL-MCNC: 18 MG/DL (ref 8–22)
CALCIUM SERPL-MCNC: 8.8 MG/DL (ref 8.5–10.5)
CHLORIDE SERPL-SCNC: 100 MMOL/L (ref 96–112)
CO2 SERPL-SCNC: 22 MMOL/L (ref 20–33)
CREAT SERPL-MCNC: 0.96 MG/DL (ref 0.5–1.4)
EKG IMPRESSION: NORMAL
EOSINOPHIL # BLD AUTO: 0.31 K/UL (ref 0–0.51)
EOSINOPHIL NFR BLD: 3.4 % (ref 0–6.9)
ERYTHROCYTE [DISTWIDTH] IN BLOOD BY AUTOMATED COUNT: 44 FL (ref 35.9–50)
EST. AVERAGE GLUCOSE BLD GHB EST-MCNC: 226 MG/DL
GLOBULIN SER CALC-MCNC: 2.2 G/DL (ref 1.9–3.5)
GLUCOSE BLD-MCNC: 145 MG/DL (ref 65–99)
GLUCOSE SERPL-MCNC: 161 MG/DL (ref 65–99)
HBA1C MFR BLD: 9.5 % (ref 0–5.6)
HCT VFR BLD AUTO: 39.9 % (ref 42–52)
HGB BLD-MCNC: 12.9 G/DL (ref 14–18)
INR PPP: 0.9 (ref 0.87–1.13)
LYMPHOCYTES # BLD AUTO: 1.77 K/UL (ref 1–4.8)
LYMPHOCYTES NFR BLD: 19.7 % (ref 22–41)
MAGNESIUM SERPL-MCNC: 2 MG/DL (ref 1.5–2.5)
MANUAL DIFF BLD: NORMAL
MCH RBC QN AUTO: 28.2 PG (ref 27–33)
MCHC RBC AUTO-ENTMCNC: 32.3 G/DL (ref 33.7–35.3)
MCV RBC AUTO: 87.3 FL (ref 81.4–97.8)
MICROCYTES BLD QL SMEAR: ABNORMAL
MONOCYTES # BLD AUTO: 0.93 K/UL (ref 0–0.85)
MONOCYTES NFR BLD AUTO: 10.3 % (ref 0–13.4)
MORPHOLOGY BLD-IMP: NORMAL
NEUTROPHILS # BLD AUTO: 5.92 K/UL (ref 1.82–7.42)
NEUTROPHILS NFR BLD: 63.2 % (ref 44–72)
NEUTS BAND NFR BLD MANUAL: 2.6 % (ref 0–10)
NRBC # BLD AUTO: 0 K/UL
NRBC BLD-RTO: 0 /100 WBC
PLATELET # BLD AUTO: 286 K/UL (ref 164–446)
PLATELET BLD QL SMEAR: NORMAL
PMV BLD AUTO: 10 FL (ref 9–12.9)
POTASSIUM SERPL-SCNC: 3.9 MMOL/L (ref 3.6–5.5)
PROT SERPL-MCNC: 6.4 G/DL (ref 6–8.2)
PROTHROMBIN TIME: 12.4 SEC (ref 12–14.6)
RBC # BLD AUTO: 4.57 M/UL (ref 4.7–6.1)
RBC BLD AUTO: PRESENT
RH BLD: NORMAL
SODIUM SERPL-SCNC: 136 MMOL/L (ref 135–145)
TROPONIN T SERPL-MCNC: 13 NG/L (ref 6–19)
WBC # BLD AUTO: 9 K/UL (ref 4.8–10.8)

## 2020-06-20 PROCEDURE — 70498 CT ANGIOGRAPHY NECK: CPT

## 2020-06-20 PROCEDURE — 84484 ASSAY OF TROPONIN QUANT: CPT

## 2020-06-20 PROCEDURE — 83036 HEMOGLOBIN GLYCOSYLATED A1C: CPT

## 2020-06-20 PROCEDURE — 82962 GLUCOSE BLOOD TEST: CPT

## 2020-06-20 PROCEDURE — 80053 COMPREHEN METABOLIC PANEL: CPT

## 2020-06-20 PROCEDURE — 86850 RBC ANTIBODY SCREEN: CPT

## 2020-06-20 PROCEDURE — 85027 COMPLETE CBC AUTOMATED: CPT

## 2020-06-20 PROCEDURE — 85007 BL SMEAR W/DIFF WBC COUNT: CPT

## 2020-06-20 PROCEDURE — 99255 IP/OBS CONSLTJ NEW/EST HI 80: CPT | Performed by: PSYCHIATRY & NEUROLOGY

## 2020-06-20 PROCEDURE — 83735 ASSAY OF MAGNESIUM: CPT

## 2020-06-20 PROCEDURE — 93306 TTE W/DOPPLER COMPLETE: CPT

## 2020-06-20 PROCEDURE — 0042T CT-CEREBRAL PERFUSION ANALYSIS: CPT

## 2020-06-20 PROCEDURE — 70450 CT HEAD/BRAIN W/O DYE: CPT

## 2020-06-20 PROCEDURE — 700117 HCHG RX CONTRAST REV CODE 255: Performed by: EMERGENCY MEDICINE

## 2020-06-20 PROCEDURE — 70496 CT ANGIOGRAPHY HEAD: CPT

## 2020-06-20 PROCEDURE — 99223 1ST HOSP IP/OBS HIGH 75: CPT | Performed by: HOSPITALIST

## 2020-06-20 PROCEDURE — 85610 PROTHROMBIN TIME: CPT

## 2020-06-20 PROCEDURE — 86901 BLOOD TYPING SEROLOGIC RH(D): CPT

## 2020-06-20 PROCEDURE — 85730 THROMBOPLASTIN TIME PARTIAL: CPT

## 2020-06-20 PROCEDURE — 700102 HCHG RX REV CODE 250 W/ 637 OVERRIDE(OP): Performed by: HOSPITALIST

## 2020-06-20 PROCEDURE — 71045 X-RAY EXAM CHEST 1 VIEW: CPT

## 2020-06-20 PROCEDURE — 86900 BLOOD TYPING SEROLOGIC ABO: CPT

## 2020-06-20 PROCEDURE — 99285 EMERGENCY DEPT VISIT HI MDM: CPT

## 2020-06-20 PROCEDURE — 93005 ELECTROCARDIOGRAM TRACING: CPT | Performed by: EMERGENCY MEDICINE

## 2020-06-20 PROCEDURE — 770020 HCHG ROOM/CARE - TELE (206)

## 2020-06-20 RX ORDER — PROMETHAZINE HYDROCHLORIDE 25 MG/1
12.5-25 SUPPOSITORY RECTAL EVERY 4 HOURS PRN
Status: DISCONTINUED | OUTPATIENT
Start: 2020-06-20 | End: 2020-06-22 | Stop reason: HOSPADM

## 2020-06-20 RX ORDER — ONDANSETRON 4 MG/1
4 TABLET, ORALLY DISINTEGRATING ORAL EVERY 4 HOURS PRN
Status: DISCONTINUED | OUTPATIENT
Start: 2020-06-20 | End: 2020-06-22 | Stop reason: HOSPADM

## 2020-06-20 RX ORDER — ONDANSETRON 2 MG/ML
4 INJECTION INTRAMUSCULAR; INTRAVENOUS EVERY 4 HOURS PRN
Status: DISCONTINUED | OUTPATIENT
Start: 2020-06-20 | End: 2020-06-22 | Stop reason: HOSPADM

## 2020-06-20 RX ORDER — SERTRALINE HYDROCHLORIDE 100 MG/1
100 TABLET, FILM COATED ORAL DAILY
Status: DISCONTINUED | OUTPATIENT
Start: 2020-06-21 | End: 2020-06-22 | Stop reason: HOSPADM

## 2020-06-20 RX ORDER — POLYETHYLENE GLYCOL 3350 17 G/17G
1 POWDER, FOR SOLUTION ORAL
Status: DISCONTINUED | OUTPATIENT
Start: 2020-06-20 | End: 2020-06-22 | Stop reason: HOSPADM

## 2020-06-20 RX ORDER — FAMOTIDINE 10 MG
10 TABLET ORAL 2 TIMES DAILY
COMMUNITY
End: 2021-02-03

## 2020-06-20 RX ORDER — FENOFIBRATE 67 MG/1
134 CAPSULE ORAL EVERY MORNING
Status: DISCONTINUED | OUTPATIENT
Start: 2020-06-21 | End: 2020-06-22 | Stop reason: HOSPADM

## 2020-06-20 RX ORDER — HYDRALAZINE HYDROCHLORIDE 20 MG/ML
10 INJECTION INTRAMUSCULAR; INTRAVENOUS
Status: DISCONTINUED | OUTPATIENT
Start: 2020-06-20 | End: 2020-06-22 | Stop reason: HOSPADM

## 2020-06-20 RX ORDER — PROMETHAZINE HYDROCHLORIDE 25 MG/1
12.5-25 TABLET ORAL EVERY 4 HOURS PRN
Status: DISCONTINUED | OUTPATIENT
Start: 2020-06-20 | End: 2020-06-22 | Stop reason: HOSPADM

## 2020-06-20 RX ORDER — PROCHLORPERAZINE EDISYLATE 5 MG/ML
5-10 INJECTION INTRAMUSCULAR; INTRAVENOUS EVERY 4 HOURS PRN
Status: DISCONTINUED | OUTPATIENT
Start: 2020-06-20 | End: 2020-06-22 | Stop reason: HOSPADM

## 2020-06-20 RX ORDER — ATORVASTATIN CALCIUM 80 MG/1
80 TABLET, FILM COATED ORAL EVERY EVENING
Status: DISCONTINUED | OUTPATIENT
Start: 2020-06-20 | End: 2020-06-22 | Stop reason: HOSPADM

## 2020-06-20 RX ORDER — OXYCODONE HYDROCHLORIDE 5 MG/1
2.5 TABLET ORAL
Status: DISCONTINUED | OUTPATIENT
Start: 2020-06-20 | End: 2020-06-22 | Stop reason: HOSPADM

## 2020-06-20 RX ORDER — BUDESONIDE AND FORMOTEROL FUMARATE DIHYDRATE 160; 4.5 UG/1; UG/1
2 AEROSOL RESPIRATORY (INHALATION) 2 TIMES DAILY
COMMUNITY
End: 2021-02-03

## 2020-06-20 RX ORDER — DIVALPROEX SODIUM 500 MG/1
500 TABLET, DELAYED RELEASE ORAL EVERY MORNING
Status: DISCONTINUED | OUTPATIENT
Start: 2020-06-21 | End: 2020-06-22 | Stop reason: HOSPADM

## 2020-06-20 RX ORDER — LEVOTHYROXINE SODIUM 0.2 MG/1
200 TABLET ORAL
Status: DISCONTINUED | OUTPATIENT
Start: 2020-06-21 | End: 2020-06-22 | Stop reason: HOSPADM

## 2020-06-20 RX ORDER — OXYCODONE HYDROCHLORIDE 5 MG/1
5 TABLET ORAL
Status: DISCONTINUED | OUTPATIENT
Start: 2020-06-20 | End: 2020-06-22 | Stop reason: HOSPADM

## 2020-06-20 RX ORDER — ACETAMINOPHEN 325 MG/1
650 TABLET ORAL EVERY 6 HOURS PRN
Status: DISCONTINUED | OUTPATIENT
Start: 2020-06-20 | End: 2020-06-22 | Stop reason: HOSPADM

## 2020-06-20 RX ORDER — DIVALPROEX SODIUM 500 MG/1
1500 TABLET, DELAYED RELEASE ORAL
Status: DISCONTINUED | OUTPATIENT
Start: 2020-06-20 | End: 2020-06-22 | Stop reason: HOSPADM

## 2020-06-20 RX ORDER — DEXTROSE MONOHYDRATE 25 G/50ML
50 INJECTION, SOLUTION INTRAVENOUS
Status: DISCONTINUED | OUTPATIENT
Start: 2020-06-20 | End: 2020-06-22 | Stop reason: HOSPADM

## 2020-06-20 RX ORDER — HYDROMORPHONE HYDROCHLORIDE 1 MG/ML
0.25 INJECTION, SOLUTION INTRAMUSCULAR; INTRAVENOUS; SUBCUTANEOUS
Status: DISCONTINUED | OUTPATIENT
Start: 2020-06-20 | End: 2020-06-22 | Stop reason: HOSPADM

## 2020-06-20 RX ORDER — ACETAMINOPHEN 500 MG
1000 TABLET ORAL 2 TIMES DAILY PRN
COMMUNITY
End: 2020-08-31

## 2020-06-20 RX ORDER — AMOXICILLIN 250 MG
2 CAPSULE ORAL 2 TIMES DAILY
Status: DISCONTINUED | OUTPATIENT
Start: 2020-06-20 | End: 2020-06-22 | Stop reason: HOSPADM

## 2020-06-20 RX ORDER — BISACODYL 10 MG
10 SUPPOSITORY, RECTAL RECTAL
Status: DISCONTINUED | OUTPATIENT
Start: 2020-06-20 | End: 2020-06-22 | Stop reason: HOSPADM

## 2020-06-20 RX ORDER — LABETALOL HYDROCHLORIDE 5 MG/ML
10 INJECTION, SOLUTION INTRAVENOUS EVERY 4 HOURS PRN
Status: DISCONTINUED | OUTPATIENT
Start: 2020-06-20 | End: 2020-06-22 | Stop reason: HOSPADM

## 2020-06-20 RX ORDER — LATANOPROST 50 UG/ML
1 SOLUTION/ DROPS OPHTHALMIC NIGHTLY
Status: DISCONTINUED | OUTPATIENT
Start: 2020-06-20 | End: 2020-06-20

## 2020-06-20 RX ORDER — LEVOTHYROXINE SODIUM 0.2 MG/1
200 TABLET ORAL
COMMUNITY
End: 2021-02-03

## 2020-06-20 RX ORDER — BUSPIRONE HYDROCHLORIDE 10 MG/1
10 TABLET ORAL 3 TIMES DAILY
Status: DISCONTINUED | OUTPATIENT
Start: 2020-06-20 | End: 2020-06-22 | Stop reason: HOSPADM

## 2020-06-20 RX ADMIN — IOHEXOL 40 ML: 350 INJECTION, SOLUTION INTRAVENOUS at 18:15

## 2020-06-20 RX ADMIN — IOHEXOL 80 ML: 350 INJECTION, SOLUTION INTRAVENOUS at 18:15

## 2020-06-20 ASSESSMENT — COGNITIVE AND FUNCTIONAL STATUS - GENERAL
SUGGESTED CMS G CODE MODIFIER DAILY ACTIVITY: CL
TURNING FROM BACK TO SIDE WHILE IN FLAT BAD: A LOT
STANDING UP FROM CHAIR USING ARMS: A LOT
PERSONAL GROOMING: A LOT
HELP NEEDED FOR BATHING: A LOT
MOVING TO AND FROM BED TO CHAIR: A LOT
EATING MEALS: A LOT
TOILETING: A LOT
DRESSING REGULAR UPPER BODY CLOTHING: A LOT
MOVING FROM LYING ON BACK TO SITTING ON SIDE OF FLAT BED: A LOT
DRESSING REGULAR LOWER BODY CLOTHING: A LOT
DAILY ACTIVITIY SCORE: 12
SUGGESTED CMS G CODE MODIFIER MOBILITY: CL
CLIMB 3 TO 5 STEPS WITH RAILING: TOTAL
MOBILITY SCORE: 11
WALKING IN HOSPITAL ROOM: A LOT

## 2020-06-20 ASSESSMENT — ENCOUNTER SYMPTOMS
BRUISES/BLEEDS EASILY: 0
MUSCULOSKELETAL NEGATIVE: 1
RESPIRATORY NEGATIVE: 1
SEIZURES: 0
ABDOMINAL PAIN: 0
DEPRESSION: 1
HEMOPTYSIS: 0
NERVOUS/ANXIOUS: 1
DIARRHEA: 0
CONSTIPATION: 0
CARDIOVASCULAR NEGATIVE: 1
BLOOD IN STOOL: 0
COUGH: 0
WHEEZING: 0
FEVER: 0
HEARTBURN: 0
PALPITATIONS: 0
DIAPHORESIS: 0
NAUSEA: 0
DIZZINESS: 0
EYES NEGATIVE: 1
CONSTITUTIONAL NEGATIVE: 1
FOCAL WEAKNESS: 0
VOMITING: 0
WEAKNESS: 1
GASTROINTESTINAL NEGATIVE: 1
HEADACHES: 1
DOUBLE VISION: 0
SPEECH CHANGE: 1
LOSS OF CONSCIOUSNESS: 0
CHILLS: 0

## 2020-06-20 ASSESSMENT — LIFESTYLE VARIABLES
AVERAGE NUMBER OF DAYS PER WEEK YOU HAVE A DRINK CONTAINING ALCOHOL: 3
DOES PATIENT WANT TO STOP DRINKING: NO
EVER HAD A DRINK FIRST THING IN THE MORNING TO STEADY YOUR NERVES TO GET RID OF A HANGOVER: YES
ALCOHOL_USE: YES
HAVE YOU EVER FELT YOU SHOULD CUT DOWN ON YOUR DRINKING: NO
EVER FELT BAD OR GUILTY ABOUT YOUR DRINKING: NO
CONSUMPTION TOTAL: INCOMPLETE
TOTAL SCORE: 1
SUBSTANCE_ABUSE: 0
EVER_SMOKED: YES
ON A TYPICAL DAY WHEN YOU DRINK ALCOHOL HOW MANY DRINKS DO YOU HAVE: 6
HAVE PEOPLE ANNOYED YOU BY CRITICIZING YOUR DRINKING: NO
TOTAL SCORE: 1
TOTAL SCORE: 1

## 2020-06-20 ASSESSMENT — FIBROSIS 4 INDEX
FIB4 SCORE: 0.73
FIB4 SCORE: 0.53

## 2020-06-21 ENCOUNTER — APPOINTMENT (OUTPATIENT)
Dept: RADIOLOGY | Facility: MEDICAL CENTER | Age: 40
DRG: 880 | End: 2020-06-21
Attending: HOSPITALIST
Payer: MEDICAID

## 2020-06-21 LAB
CHOLEST SERPL-MCNC: 247 MG/DL (ref 100–199)
GLUCOSE BLD-MCNC: 147 MG/DL (ref 65–99)
GLUCOSE BLD-MCNC: 166 MG/DL (ref 65–99)
GLUCOSE BLD-MCNC: 189 MG/DL (ref 65–99)
GLUCOSE BLD-MCNC: 237 MG/DL (ref 65–99)
HDLC SERPL-MCNC: 27 MG/DL
LDLC SERPL CALC-MCNC: ABNORMAL MG/DL
LV EJECT FRACT  99904: 55
LV EJECT FRACT MOD 2C 99903: 63.89
LV EJECT FRACT MOD 4C 99902: 52.18
LV EJECT FRACT MOD BP 99901: 56.17
TRIGL SERPL-MCNC: 1109 MG/DL (ref 0–149)

## 2020-06-21 PROCEDURE — 700111 HCHG RX REV CODE 636 W/ 250 OVERRIDE (IP): Performed by: FAMILY MEDICINE

## 2020-06-21 PROCEDURE — 80061 LIPID PANEL: CPT

## 2020-06-21 PROCEDURE — A9270 NON-COVERED ITEM OR SERVICE: HCPCS | Performed by: HOSPITALIST

## 2020-06-21 PROCEDURE — 700102 HCHG RX REV CODE 250 W/ 637 OVERRIDE(OP): Performed by: HOSPITALIST

## 2020-06-21 PROCEDURE — 97162 PT EVAL MOD COMPLEX 30 MIN: CPT

## 2020-06-21 PROCEDURE — 770006 HCHG ROOM/CARE - MED/SURG/GYN SEMI*

## 2020-06-21 PROCEDURE — 36415 COLL VENOUS BLD VENIPUNCTURE: CPT

## 2020-06-21 PROCEDURE — 92610 EVALUATE SWALLOWING FUNCTION: CPT

## 2020-06-21 PROCEDURE — 70551 MRI BRAIN STEM W/O DYE: CPT

## 2020-06-21 PROCEDURE — 82962 GLUCOSE BLOOD TEST: CPT

## 2020-06-21 PROCEDURE — 99232 SBSQ HOSP IP/OBS MODERATE 35: CPT | Performed by: FAMILY MEDICINE

## 2020-06-21 PROCEDURE — 93306 TTE W/DOPPLER COMPLETE: CPT | Mod: 26 | Performed by: INTERNAL MEDICINE

## 2020-06-21 PROCEDURE — 97166 OT EVAL MOD COMPLEX 45 MIN: CPT

## 2020-06-21 RX ADMIN — DIVALPROEX SODIUM 500 MG: 500 TABLET, DELAYED RELEASE ORAL at 12:26

## 2020-06-21 RX ADMIN — OXYCODONE HYDROCHLORIDE 5 MG: 5 TABLET ORAL at 18:32

## 2020-06-21 RX ADMIN — INSULIN HUMAN 4 UNITS: 100 INJECTION, SOLUTION PARENTERAL at 21:21

## 2020-06-21 RX ADMIN — INSULIN HUMAN 3 UNITS: 100 INJECTION, SOLUTION PARENTERAL at 11:40

## 2020-06-21 RX ADMIN — ASPIRIN 325 MG: 325 TABLET, COATED ORAL at 12:25

## 2020-06-21 RX ADMIN — LEVOTHYROXINE SODIUM 200 MCG: 200 TABLET ORAL at 12:25

## 2020-06-21 RX ADMIN — ENOXAPARIN SODIUM 40 MG: 100 INJECTION SUBCUTANEOUS at 12:31

## 2020-06-21 RX ADMIN — FENOFIBRATE 134 MG: 134 CAPSULE ORAL at 12:25

## 2020-06-21 RX ADMIN — ATORVASTATIN CALCIUM 80 MG: 80 TABLET, FILM COATED ORAL at 16:27

## 2020-06-21 RX ADMIN — BUSPIRONE HYDROCHLORIDE 10 MG: 10 TABLET ORAL at 16:27

## 2020-06-21 RX ADMIN — BUSPIRONE HYDROCHLORIDE 10 MG: 10 TABLET ORAL at 12:26

## 2020-06-21 RX ADMIN — SERTRALINE 100 MG: 100 TABLET, FILM COATED ORAL at 12:25

## 2020-06-21 RX ADMIN — ACETAMINOPHEN 650 MG: 325 TABLET, FILM COATED ORAL at 21:26

## 2020-06-21 RX ADMIN — DIVALPROEX SODIUM 1500 MG: 500 TABLET, DELAYED RELEASE ORAL at 21:19

## 2020-06-21 ASSESSMENT — COGNITIVE AND FUNCTIONAL STATUS - GENERAL
DAILY ACTIVITIY SCORE: 14
DRESSING REGULAR UPPER BODY CLOTHING: A LOT
CLIMB 3 TO 5 STEPS WITH RAILING: TOTAL
TURNING FROM BACK TO SIDE WHILE IN FLAT BAD: A LOT
STANDING UP FROM CHAIR USING ARMS: A LOT
MOBILITY SCORE: 8
EATING MEALS: A LITTLE
HELP NEEDED FOR BATHING: A LOT
DRESSING REGULAR LOWER BODY CLOTHING: A LOT
SUGGESTED CMS G CODE MODIFIER MOBILITY: CM
MOVING FROM LYING ON BACK TO SITTING ON SIDE OF FLAT BED: UNABLE
SUGGESTED CMS G CODE MODIFIER DAILY ACTIVITY: CK
WALKING IN HOSPITAL ROOM: TOTAL
TOILETING: A LOT
MOVING TO AND FROM BED TO CHAIR: UNABLE
PERSONAL GROOMING: A LITTLE

## 2020-06-21 ASSESSMENT — ENCOUNTER SYMPTOMS
MYALGIAS: 0
EYE PAIN: 0
NECK PAIN: 0
PALPITATIONS: 0
DIZZINESS: 0
BACK PAIN: 0
ORTHOPNEA: 0
WEIGHT LOSS: 0
CHILLS: 0
HEARTBURN: 0
VOMITING: 0
HEMOPTYSIS: 0
FEVER: 0
HEADACHES: 0
SPUTUM PRODUCTION: 0
COUGH: 0
NAUSEA: 0
TINGLING: 0
EYE DISCHARGE: 0
PHOTOPHOBIA: 0

## 2020-06-21 ASSESSMENT — GAIT ASSESSMENTS: GAIT LEVEL OF ASSIST: UNABLE TO PARTICIPATE

## 2020-06-21 ASSESSMENT — ACTIVITIES OF DAILY LIVING (ADL): TOILETING: INDEPENDENT

## 2020-06-21 NOTE — H&P
Hospital Medicine History & Physical Note    Date of Service  6/20/2020    Primary Care Physician  ANIKET Davis    Code Status  Full Code    Chief Complaint  Chief Complaint   Patient presents with   • Unilateral Weakness       History of Presenting Illness  40 y.o. male who presented 6/20/2020 with sudden onset of right-sided weakness.  The patient is outside the window of TPA as the patient apparently fell asleep and woke up with neurological symptoms.  Is impossible to  when they started.  The patient at this point will be admitted to the neurology floor.  He will undergo MRI of the head and echocardiogram.  Will have him on aspirin and statin.  Neurology will be consulted.  The patient will have PT OT evaluation and speech evaluation.  Patient's diabetes will continue to be monitored.  For now he will be on permissive hypertension protocol.  Speech will evaluate him and then we will let him eat if he is able to pass the speech and swallow evaluation.    Review of Systems  Review of Systems   Constitutional: Negative.  Negative for chills, diaphoresis and fever.   HENT: Negative.    Eyes: Negative.  Negative for double vision.   Respiratory: Negative.  Negative for cough, hemoptysis and wheezing.    Cardiovascular: Negative.  Negative for chest pain, palpitations and leg swelling.   Gastrointestinal: Negative.  Negative for abdominal pain, blood in stool, constipation, diarrhea, heartburn, nausea and vomiting.   Genitourinary: Negative.  Negative for frequency, hematuria and urgency.   Musculoskeletal: Negative.  Negative for joint pain.   Skin: Negative.  Negative for itching and rash.   Neurological: Positive for speech change, weakness (Started around 430 on the right side) and headaches. Negative for dizziness, focal weakness, seizures and loss of consciousness.   Endo/Heme/Allergies: Negative.  Does not bruise/bleed easily.   Psychiatric/Behavioral: Positive for depression. Negative for  "substance abuse and suicidal ideas. The patient is nervous/anxious.    All other systems reviewed and are negative.      Past Medical History   has a past medical history of Anxiety, ASTHMA, Bipolar 1 disorder (HCC), Depression, Diabetes (HCC), Fall, Glaucoma, Glaucoma (1982), Hypothyroidism, Indigestion, Mental disorder, Murmur, Pneumonia, Psychiatric problem (2002), S/P thyroidectomy, Seizure (HCC) (2010), Seizure disorder ([facility]), and Unspecified disorder of thyroid.    Surgical History   has a past surgical history that includes eye surgery; thyroid lobectomy; and other.     Family History  family history includes Heart Disease in his mother; Hypertension in his mother; Lung Disease in his mother; Stroke in his maternal grandmother.     Social History   reports that he has been smoking cigarettes and cigars. He has been smoking about 0.25 packs per day. He has never used smokeless tobacco. He reports previous alcohol use. He reports current drug use. Drug: Inhaled.    Allergies  Allergies   Allergen Reactions   • Abilify Unspecified     \"Feeling tired, like I don't even know whats going on around me\"   • Fish      Pt reports fish causes him to be sick to his stomach         Medications  Prior to Admission Medications   Prescriptions Last Dose Informant Patient Reported? Taking?   ALPRAZolam (XANAX) 0.25 MG Tab   Yes No   Cholecalciferol (VITAMIN D) 2000 UNIT Tab  Patient Yes No   Sig: Take 2,000 Units by mouth every day.   Empagliflozin (JARDIANCE) 25 MG Tab  Patient Yes No   Sig: Take 25 mg by mouth every day.   LATUDA 20 MG Tab   Yes No   aspirin EC (ECOTRIN) 81 MG Tablet Delayed Response  Patient Yes No   Sig: Take 81 mg by mouth every day.   atorvastatin (LIPITOR) 80 MG tablet  Patient Yes No   Sig: Take 80 mg by mouth every evening.   busPIRone (BUSPAR) 10 MG Tab tablet  Patient Yes No   Sig: Take 10 mg by mouth 3 times a day.   divalproex (DEPAKOTE) 500 MG Tablet Delayed Response  Patient Yes No   Sig: Take " 500-1,500 mg by mouth 2 Times a Day. 500mg in AM  1500mg at PM   fenofibrate (ANTARA) 130 MG capsule  Patient Yes No   Sig: Take 130 mg by mouth every morning.   glimepiride (AMARYL) 4 MG Tab  Patient Yes No   Sig: Take 4 mg by mouth every morning.   hydrOXYzine HCl (ATARAX) 25 MG Tab   Yes No   insulin glargine (BASAGLAR KWIKPEN) 100 UNIT/ML Solution Pen-injector injection  Patient Yes No   Sig: Inject 20 Units as instructed every evening.   latanoprost (XALATAN) 0.005 % Solution  Patient Yes No   Sig: Place 1 Drop in both eyes every evening.   levothyroxine (SYNTHROID) 200 MCG Tab  Patient Yes No   Sig: Take 200 mcg by mouth Every morning on an empty stomach.   lisinopril (PRINIVIL) 10 MG Tab  Patient Yes No   Sig: Take 10 mg by mouth every day.   metformin (GLUCOPHAGE) 1000 MG tablet  Patient Yes No   Sig: Take 1,000 mg by mouth 2 times a day.   montelukast (SINGULAIR) 10 MG Tab  Patient Yes No   Sig: Take 10 mg by mouth every day.   omeprazole (PRILOSEC) 20 MG delayed-release capsule   Yes No   prazosin (MINIPRESS) 2 MG Cap  Patient Yes No   Sig: Take 2 mg by mouth every evening.   raNITidine (ZANTAC) 150 MG Tab  Patient Yes No   Sig: Take 150 mg by mouth 2 times a day.   sertraline (ZOLOFT) 100 MG Tab  Patient Yes No   Sig: Take 100 mg by mouth every day.   traZODone (DESYREL) 50 MG Tab   Yes No   travoprost (TRAVATAN Z) 0.004 % Solution  Patient Yes No   Sig: Place 1 Drop in both eyes every evening.      Facility-Administered Medications: None       Physical Exam  Temp:  [36.9 °C (98.5 °F)] 36.9 °C (98.5 °F)  Pulse:  [72-74] 72  Resp:  [23] 23  BP: (117)/(76) 117/76  SpO2:  [94 %] 94 %    Physical Exam  Vitals signs and nursing note reviewed.   Constitutional:       Appearance: He is well-developed. He is obese. He is ill-appearing.   HENT:      Head: Normocephalic and atraumatic.      Right Ear: External ear normal.      Left Ear: External ear normal.      Nose: Nose normal.      Mouth/Throat:      Mouth:  Mucous membranes are moist.      Pharynx: Oropharynx is clear.   Eyes:      General: No scleral icterus.        Right eye: No discharge.         Left eye: No discharge.      Comments: Patient has no vision in the left eye   Neck:      Musculoskeletal: Normal range of motion and neck supple.      Thyroid: No thyromegaly.      Vascular: No JVD.   Cardiovascular:      Rate and Rhythm: Normal rate and regular rhythm.      Pulses: Normal pulses.      Heart sounds: Normal heart sounds. No friction rub.   Pulmonary:      Effort: Accessory muscle usage present.      Breath sounds: Decreased air movement present. Examination of the right-middle field reveals decreased breath sounds. Examination of the left-middle field reveals decreased breath sounds. Examination of the right-lower field reveals decreased breath sounds. Examination of the left-lower field reveals decreased breath sounds. Decreased breath sounds present.   Chest:      Chest wall: No tenderness.   Abdominal:      General: Abdomen is flat. Bowel sounds are normal. There is distension.      Palpations: Abdomen is soft. There is no mass.      Tenderness: There is no abdominal tenderness. There is no guarding or rebound.   Musculoskeletal: Normal range of motion.         General: Tenderness present.   Lymphadenopathy:      Cervical: No cervical adenopathy.   Skin:     General: Skin is warm and dry.      Findings: Erythema present. No rash.   Neurological:      Mental Status: He is alert and oriented to person, place, and time. Mental status is at baseline.      GCS: GCS eye subscore is 4. GCS verbal subscore is 4. GCS motor subscore is 6.      Cranial Nerves: No cranial nerve deficit.      Motor: Weakness, atrophy and abnormal muscle tone present.      Coordination: Impaired rapid alternating movements.      Gait: Gait abnormal.      Deep Tendon Reflexes: Reflexes are normal and symmetric.      Comments: Complete flaccidity on the right side   Psychiatric:          Attention and Perception: He is inattentive.         Mood and Affect: Mood is depressed.         Speech: Speech is delayed and slurred.         Behavior: Behavior is slowed.         Cognition and Memory: Cognition is impaired. Memory is impaired.         Laboratory:  Recent Labs     06/20/20 1740   WBC 9.0   RBC 4.57*   HEMOGLOBIN 12.9*   HEMATOCRIT 39.9*   MCV 87.3   MCH 28.2   MCHC 32.3*   RDW 44.0   PLATELETCT 286   MPV 10.0     Recent Labs     06/20/20  1740   SODIUM 136   POTASSIUM 3.9   CHLORIDE 100   CO2 22   GLUCOSE 161*   BUN 18   CREATININE 0.96   CALCIUM 8.8     Recent Labs     06/20/20  1740   ALTSGPT 16   ASTSGOT 22   ALKPHOSPHAT 50   TBILIRUBIN 0.3   GLUCOSE 161*     Recent Labs     06/20/20 1740   APTT 27.6   INR 0.90     No results for input(s): NTPROBNP in the last 72 hours.      Recent Labs     06/20/20  1740   TROPONINT 13       Imaging:  CT-CTA NECK WITH & W/O-POST PROCESSING   Final Result      1.  CT angiogram of the neck within normal limits.      2.  2 midline neck hypervascular or hyperdense mass with the more cranial measuring 3.7 x 2.9 x 3.2 cm and the more caudal measuring 1.9 x 1.8 x 1.2 cm.      3.  These could represent thyroid nodule/goiter although no definite thyroid tissue is identified. Alternatively, other neoplastic mass or possible      CT-CTA HEAD WITH & W/O-POST PROCESS   Final Result      CT angiogram of the [place] within normal limits.      CT-CEREBRAL PERFUSION ANALYSIS   Final Result      1.  Cerebral blood flow less than 30% likely representing completed infarct = 0 mL.      2.  T Max more than 6 seconds likely representing combination of completed infarct and ischemia = 16 mL.      3.  Mismatched volume likely representing ischemic brain/penumbra = 16 mL      4.  Please note that the cerebral perfusion was performed on the limited brain tissue around the basal ganglia region. Infarct/ischemia outside the CT perfusion sections can be missed in this study.       CT-HEAD W/O   Final Result      1.  No acute intracranial abnormality      2.  Severe bilateral confluent white matter changes out of proportion to age, chronic      DX-CHEST-PORTABLE (1 VIEW)    (Results Pending)   EC-ECHOCARDIOGRAM COMPLETE W/O CONT    (Results Pending)   MR-BRAIN-W/O    (Results Pending)         Assessment/Plan:    Right hemiparesis (HCC)- (present on admission)  Assessment & Plan  Patient tells me that around 430 he woke up from sleep and noticed that his entire right side was not functioning.  He says before that he was able to move and function.  The patient at this point will be admitted to the neurology floor.  He will undergo at this point an echocardiogram MRI of the head he will be placed on statin and aspirin.  Neurology has been consulted will evaluate him.  Patient will also be eval by PT OT  Patient will need speech evaluation as well.    HTN (hypertension)- (present on admission)  Assessment & Plan  Permissive hypertension for now.  Hold blood pressure medications    HLD (hyperlipidemia)- (present on admission)  Assessment & Plan  Low-fat low-cholesterol diet  Statin  Fasting lipid panel    Type 2 diabetes mellitus without complication, without long-term current use of insulin (HCC)- (present on admission)  Assessment & Plan  -accus with sliding scale coverage  -diabetic diet  -diabetic education  -follow glycohemoglobin levels long term, most recent hemoglobin A1c 7.8  -Hold oral anti-hypoglycemics.  -monitor for hypoglycemic episodes and adjust control if he should get low    PTSD (post-traumatic stress disorder)- (present on admission)  Assessment & Plan  With patient having PTSD continue at this point BuSpar and Zoloft    Anemia- (present on admission)  Assessment & Plan  Anemia is mild.  Currently not in a transfusion zone.  Patient will be evaluated for his H&H if he drops below 7 and 21 he will be transfused.    Class 1 obesity due to excess calories with serious comorbidity  and body mass index (BMI) of 34.0 to 34.9 in adult- (present on admission)  Assessment & Plan  Body mass index is 34.55 kg/m².  Patient will need outpatient weight loss management program    Depression- (present on admission)  Assessment & Plan  Chronic depression continue Zoloft and BuSpar  Currently patient is not suicidal or homicidal    History of seizure- (present on admission)  Assessment & Plan  Seizure prophylaxis and monitor for seizure precautions    Acquired hypothyroidism- (present on admission)  Assessment & Plan  -supportive measures with synthroid supplementation at 200 Mcg per day, no change  -latest TSH is 3.780 and this is with in establish normal guidlines     Glaucoma- (present on admission)  Assessment & Plan  Continue with Xalatan and Travatan eyedrops

## 2020-06-21 NOTE — PROGRESS NOTES
2 RN skin check completed with OMID Cole.  Sacrum is red and blanching.  Healing scratches to the right shin.  Bilateral feet are dry/flaky.  No other skin issues noted at this time.

## 2020-06-21 NOTE — ASSESSMENT & PLAN NOTE
Neurology recommended psych consult  Asa  lipitor    Head mri showed:1.  No evidence of acute territorial infarct, intracranial hemorrhage or mass lesion.  2.  Mild to moderate diffuse cerebral substance loss.  3.  Unchanged extensive confluent periventricular and juxtacortical white matter signal abnormality consistent with demyelination or dysmyelination, alternatively findings could be seen in the setting of a remote toxic metabolic insult.

## 2020-06-21 NOTE — ASSESSMENT & PLAN NOTE
Anemia is mild.  Currently not in a transfusion zone.  Patient will be evaluated for his H&H if he drops below 7 and 21 he will be transfused.

## 2020-06-21 NOTE — CONSULTS
Hospital Neurology Stroke Consult:    Referring Physician: Candido Villar D.O.    Reason for consultation: Possible stroke    Last Known Well: 2:30pm  TPA Decision: No TPA due to multiple episodes of same symptoms w/o MRI correlate w/ spontaneous resolution.    HPI: Norm Prabhakar is a 40 y.o. male with history of anxiety, bipolar 1 disorder, depression, DM, hypothyroidism, and diagnosed conversion disorder presenting to the hospital for R sided weakness and consulted for stroke. Patient had a bifrontal headache and he went to take a nap. Upon waking up noticed his R side was not moving. He came to the hospital for further evaluation. CT Head W/O CST was neg for infarction/hemorrhage. CTP showed small mismatch, possibly artefact, and CTA Head/neck w/o high grade stenosis of LVO.     Patient has had multiple events of same symptoms for which he presented to the hospital on multiple occasions. He has been seen by multiple neurologists, and previously been diagnosed w/ functional neurologic disorder/conversion disorder for these symptoms. Multiple MRIs have been done while symptomatic, and none have revealed acute stroke. He does have a significant amount of white matter disease on MRI for which he is undergoing workup outpatient. He has not been ill.     ROS:     As above. All other systems reviewed and are negative.    Past Medical History:    has a past medical history of Anxiety, ASTHMA, Bipolar 1 disorder (HCC), Depression, Diabetes (HCC), Fall, Glaucoma, Glaucoma (1982), Hypothyroidism, Indigestion, Mental disorder, Murmur, Pneumonia, Psychiatric problem (2002), S/P thyroidectomy, Seizure (HCC) (2010), Seizure disorder ([facility]), and Unspecified disorder of thyroid.    FHx:  family history includes Heart Disease in his mother; Hypertension in his mother; Lung Disease in his mother; Stroke in his maternal grandmother.    SHx:   reports that he has been smoking cigarettes and cigars. He has been smoking about 0.25  "packs per day. He has never used smokeless tobacco. He reports previous alcohol use. He reports current drug use. Drug: Inhaled.    Allergies:  Allergies   Allergen Reactions   • Abilify Unspecified     \"Feeling tired, like I don't even know whats going on around me\"   • Fish      Pt reports fish causes him to be sick to his stomach         Medications:    Current Facility-Administered Medications:   •  [COMPLETED] iohexol (OMNIPAQUE) 350 mg/mL, 40 mL, Intravenous, Once, Candido Villar D.O., 40 mL at 06/20/20 1815  •  [COMPLETED] iohexol (OMNIPAQUE) 350 mg/mL, 80 mL, Intravenous, Once, Candido Villar D.O., 80 mL at 06/20/20 1815    Current Outpatient Medications:   •  ALPRAZolam (XANAX) 0.25 MG Tab, , Disp: , Rfl:   •  traZODone (DESYREL) 50 MG Tab, , Disp: , Rfl:   •  LATUDA 20 MG Tab, , Disp: , Rfl:   •  omeprazole (PRILOSEC) 20 MG delayed-release capsule, , Disp: , Rfl:   •  hydrOXYzine HCl (ATARAX) 25 MG Tab, , Disp: , Rfl:   •  Cholecalciferol (VITAMIN D) 2000 UNIT Tab, Take 2,000 Units by mouth every day., Disp: , Rfl:   •  sertraline (ZOLOFT) 100 MG Tab, Take 100 mg by mouth every day., Disp: , Rfl:   •  latanoprost (XALATAN) 0.005 % Solution, Place 1 Drop in both eyes every evening., Disp: , Rfl:   •  travoprost (TRAVATAN Z) 0.004 % Solution, Place 1 Drop in both eyes every evening., Disp: , Rfl:   •  fenofibrate (ANTARA) 130 MG capsule, Take 130 mg by mouth every morning., Disp: , Rfl:   •  Empagliflozin (JARDIANCE) 25 MG Tab, Take 25 mg by mouth every day., Disp: , Rfl:   •  lisinopril (PRINIVIL) 10 MG Tab, Take 10 mg by mouth every day., Disp: , Rfl:   •  montelukast (SINGULAIR) 10 MG Tab, Take 10 mg by mouth every day., Disp: , Rfl:   •  aspirin EC (ECOTRIN) 81 MG Tablet Delayed Response, Take 81 mg by mouth every day., Disp: , Rfl:   •  atorvastatin (LIPITOR) 80 MG tablet, Take 80 mg by mouth every evening., Disp: , Rfl:   •  insulin glargine (BASAGLAR KWIKPEN) 100 UNIT/ML Solution Pen-injector " "injection, Inject 20 Units as instructed every evening., Disp: , Rfl:   •  busPIRone (BUSPAR) 10 MG Tab tablet, Take 10 mg by mouth 3 times a day., Disp: , Rfl:   •  divalproex (DEPAKOTE) 500 MG Tablet Delayed Response, Take 500-1,500 mg by mouth 2 Times a Day. 500mg in AM 1500mg at PM, Disp: , Rfl:   •  glimepiride (AMARYL) 4 MG Tab, Take 4 mg by mouth every morning., Disp: , Rfl:   •  levothyroxine (SYNTHROID) 200 MCG Tab, Take 200 mcg by mouth Every morning on an empty stomach., Disp: , Rfl:   •  metformin (GLUCOPHAGE) 1000 MG tablet, Take 1,000 mg by mouth 2 times a day., Disp: , Rfl:   •  prazosin (MINIPRESS) 2 MG Cap, Take 2 mg by mouth every evening., Disp: , Rfl:   •  raNITidine (ZANTAC) 150 MG Tab, Take 150 mg by mouth 2 times a day., Disp: , Rfl:     Vitals:   Vitals:    06/20/20 1748 06/20/20 1758 06/20/20 1800 06/20/20 1802   BP:   117/76    Pulse:  74 72    Resp:  (!) 23     Temp:    36.9 °C (98.5 °F)   TempSrc:    Temporal   SpO2:  94% 94%    Weight: (!) 135.6 kg (299 lb)      Height: 1.981 m (6' 6\")          Labs:  Lab Results   Component Value Date/Time    PROTHROMBTM 14.0 12/27/2019 05:30 PM    INR 1.06 12/27/2019 05:30 PM      Lab Results   Component Value Date/Time    WBC 9.2 03/22/2020 05:05 PM    RBC 4.68 (L) 03/22/2020 05:05 PM    HEMOGLOBIN 13.3 (L) 03/22/2020 05:05 PM    HEMATOCRIT 40.0 (L) 03/22/2020 05:05 PM    MCV 85.5 03/22/2020 05:05 PM    MCH 28.4 03/22/2020 05:05 PM    MCHC 33.3 (L) 03/22/2020 05:05 PM    MPV 9.9 03/22/2020 05:05 PM    NEUTSPOLYS 54.20 03/22/2020 05:05 PM    LYMPHOCYTES 30.30 03/22/2020 05:05 PM    MONOCYTES 11.80 03/22/2020 05:05 PM    EOSINOPHILS 1.20 03/22/2020 05:05 PM    BASOPHILS 0.90 03/22/2020 05:05 PM    ANISOCYTOSIS 1+ 12/28/2019 03:25 AM      Lab Results   Component Value Date/Time    SODIUM 138 03/22/2020 05:05 PM    POTASSIUM 3.7 03/22/2020 05:05 PM    CHLORIDE 103 03/22/2020 05:05 PM    CO2 20 03/22/2020 05:05 PM    GLUCOSE 186 (H) 03/22/2020 05:05 PM    " BUN 26 (H) 03/22/2020 05:05 PM    CREATININE 1.08 03/22/2020 05:05 PM      Lab Results   Component Value Date/Time    CHOLSTRLTOT 126 12/30/2019 09:25 AM    LDL see below 12/30/2019 09:25 AM    HDL 5 (A) 12/30/2019 09:25 AM    TRIGLYCERIDE 529 (H) 12/30/2019 09:25 AM       Lab Results   Component Value Date/Time    ALKPHOSPHAT 44 02/29/2020 05:08 PM    ASTSGOT 16 02/29/2020 05:08 PM    ALTSGPT 11 02/29/2020 05:08 PM    TBILIRUBIN 0.7 02/29/2020 05:08 PM        Imaging:  CT head W/O CST personally reviewed w/ evidence of white matter disease but no acute findings of stroke/hemorrhage    CTA Head/Neck, CTP reviewed in chart.     Physical Exam:     General: 39 yo male in bed in NAD  Cardio: Normal S1/S2. No peripheral edema.   Pulm: CTAX2. No respiratory distress.   Skin: Warm, dry, no rashes or lesions   Psychiatric: Appropriate affect. No active psychosis.  HEENT: Atraumatic head, normal sclera and conjunctiva, moist oral mucosa. No lid lag.  Abdomen: Soft, non tender. No masses or hepatosplenomegaly.    Physical Exam:    NIH Stroke Scale:    1a. Level of Consciousness (Alert, drowsy, etc): 0= Alert    1b. LOC Questions (Month, age): 0= Answers both correctly    1c. LOC Commands (Open/close eyes make fist/let go): 0= Obeys both correctly    2.   Best Gaze (Eyes open - patient follows examiner's finger on face): 0= Normal    3.   Visual Fields (introduce visual stimulus/threat to patient's field quadrants): 2= Complete Hemianopia  4.   Facial Paresis (Show teeth, raise eyebrows and squeeze eyes shut): 1= Minor     5a. Motor Arm - Left (Elevate arm to 90 degrees if patient is sitting, 45 degrees if  supine): 0= No drift    5b. Motor Arm - Right (Elevate arm to 90 degrees if patient is sitting, 45 degrees if supine): 4= No movement    6a. Motor Leg - Left (Elevate leg 30 degrees with patient supine): 0= No drift    6b. Motor Leg - Right  (Elevate leg 30 degrees with patient supine): 4= No movement    7.   Limb Ataxia  (Finger-nose, heel down shin): 0= No ataxia    8.   Sensory (Pin prick to face, arm, trunk and leg - compare side to side): 2- Severe loss    9.  Best Language (Name item, describe a picture and read sentences): 0= No aphasia    10. Dysarthria (Evaluate speech clarity by patient repeating listed words): 0= Normal articulation    11. 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 The patient's speech is c/w stuttering. Additionally, when an examiner places his hand over his face there is notable power/movement on the R arm. When the examiner lets go he then either drops his hand over his head while performing evasive maneuver to avoid his face. Power was noted to be inconsistent by a second examiner when he placed the patient's arm at his side. He also has a positive Romero sign suggesting lack of effort when asked to move his R leg rather than true weakness.     Assessment/Plan:    Norm Prabhakar is a 40 y.o. male with history of anxiety, bipolar 1 disorder, depression, DM, hypothyroidism, and diagnosed conversion disorder presenting to the hospital for R sided weakness and consulted for stroke. Patient has multiple episodes of same symptoms w/ resolution w/o intervention. Today his exam is suggestive of functional/conversion rather than organic findings. The patient's speech is stuttering in quality. Additionally, power in his R arm and leg is inconsistent. Specifically he was noted to have power in his R arm, and lack of effort on the leg. Given that he has multiple events of the same symptoms which were not attributed to stroke, and given that he has been diagnosed previously w/ conversion disorder, as well as his current physical exam findings I do not feel that TPA administration is in his best interest as risks outweigh benefits. I discussed w/  the patient that his symptoms were likely to not be stroke and he agrees, as this tends to happen to him at least twice a year.     Plan:  -No TPA.  -MR Brain W/O CST.  -If no ischemia on MRI and patient remains symptomatic consult psychiatry.  -Doubt this is a Santosh's phenomenon given there was no seizure preceding the event.  -Plan discussed with consulting physician and patient's nurse      Luis Soto M.D., Diplomat of the American Board of Psychiatry and Neurology  Diplomat of [facility]   Assistant Clinical Professor, [facility] Neurology Consultant

## 2020-06-21 NOTE — THERAPY
"Occupational Therapy   Initial Evaluation     Patient Name: Norm Prabhakar  Age:  40 y.o., Sex:  male  Medical Record #: [id number]  Today's Date: 6/21/2020     Precautions  Precautions: Fall Risk, Swallow Precautions ( See Comments)    Assessment  Patient is 40 y.o. male admitted with R sided weakness, and has been previously hospitalized with these same symptoms. Pt has been diagnosed with conversion disorder. PMH significant for HTN, DM 2, bipolar 1 disorder, anemia, glaucoma.  Additional factors influencing patient status / progress: Pt demonstrates decreased safety/indep with ADLs, functional transfers/mobility, and decreased functional AROM/strength RUE. Pt would benefit from continued skilled OT services in the acute care setting to address these deficits.    Plan    Recommend Occupational Therapy 3 times per week until therapy goals are met for the following treatments:  Adaptive Equipment, Cognitive Skill Development, Community Re-integration, Neuro Re-Education / Balance, Self Care/Activities of Daily Living, Therapeutic Activities and Therapeutic Exercises.    Discharge recommendations: If pt has 24/7 caregiver support at [facility], pt is safe to d/c home with home health for continued occupational therapy services. Otherwise, pt may need post- acute rehab prior to return home.      Subjective    Pt pleasant and cooperative     Objective       06/21/20 1020   Prior Living Situation   Prior Services Other (Comments)  (\"Group home\", pt reports it's called [facility])   Housing / Facility Other (Comments)  (pt reports [facility])   Steps Into Home 0   Steps In Home 0   Bathroom Set up Walk In Shower;Grab Bars   Equipment Owned Front-Wheel Walker;Wheelchair;Grab Bar(s) In Tub / Shower;Grab Bar(s) By Toilet   Lives with - Patient's Self Care Capacity Other (Comments)  ([facility] c/g staff)   Prior Level of ADL Function   Self Feeding Independent   Bathing Independent   Dressing Independent   Toileting Independent " "  Comments per pt report   Prior Level of IADL Function   Medication Management Other (Comments)  (pt reports \"group home\" manages meds)   Kitchen Mobility Other (Comments)  (meals provided by [facility])   Home Management Other (Comments)  (assist from [facility])   Shopping Other (Comments)  ([facility] assist)   Prior Level Of Mobility Independent Without Device in Community;Independent Without Device in Home  (per pt report)   Driving / Transportation Other (Comments)  (transportation provided by [facility])   Comments per pt report   Cognition    Level of Consciousness Alert   Comments pt with dx of conversion disorder, pt has a pending psych eval   Active ROM Upper Body   Active ROM Upper Body  X   Dominant Hand Left   Comments LUE WFL, LUE flaccid    Strength Upper Body   Upper Body Strength  X   Comments LUE WFL, RUE flaccid   Coordination Upper Body   Coordination X   Fine Motor Coordination impaired d/t flaccid RUE   Gross Motor Coordination impaired d/t flaccid RUE   Balance Assessment   Sitting Balance (Static) Fair +   Sitting Balance (Dynamic) Fair   Standing Balance (Static) Fair -   Standing Balance (Dynamic) Fair -   Weight Shift Sitting Fair   Weight Shift Standing Fair   Bed Mobility    Supine to Sit Moderate Assist   Sit to Supine   (pt left sitting in chair at end of session)   Scooting Supervised   Skilled Intervention Verbal Cuing;Tactile Cuing;Compensatory Strategies;Facilitation   ADL Assessment   Lower Body Dressing Minimal Assist  (min A to adjust socks seated EOB, increased time needed)   Functional Mobility   Sit to Stand Moderate Assist  (x2)   Bed, Chair, Wheelchair Transfer Moderate Assist  (x2)   Transfer Method Stand Step  (no AD)   Mobility supine>EOB>bedside chair, no AD   Comments min VCs for technique   Activity Tolerance   Sitting in Chair pt left seated in chair at end of session   Sitting Edge of Bed 5 mins   Standing 2 mins   Patient / Family Goals   Patient / Family Goal #1 none stated   Short Term " Goals   Short Term Goal # 1 Pt will complete UB/LB dressing at min A using martinez-technique as needed x 5 sessions   Short Term Goal # 2 Pt will complete hygiene/grooming tasks standing at sink at min A x 5 sessions   Short Term Goal # 3 Pt will complete toilet transfers/toileting tasks at min A demo good safety x 5 sessions   Anticipated Discharge Equipment   DC Equipment Unable To Determine At This Time

## 2020-06-21 NOTE — THERAPY
"Speech Language Pathology   Clinical Swallow Evaluation     Patient Name: Norm Prabhakar  AGE:  40 y.o., SEX:  male  Medical Record #: [id number]  Today's Date: 6/21/2020     Precautions  Precautions: Fall Risk, Swallow Precautions ( See Comments)    Assessment    Patient is 40 y.o. male admitted with right sided weakness. MRI was (-) for intracranial process. PMHX: includes diabetes, dysfluent speech, PTSD, depression, bipolar disorder, glaucoma, acquired hypothyroidism, seizure.  Although he has a history, per EMR, of right sided weakness the patient reports it \"always gets better\" and that he had full use of his right arm before being admitted to the hospital. He denies any changes to his speech.  Clinical swallow evaluation revealed poor lip and tongue movement on command but during PO intake he licked his lips, stuck his tongue out, smiled symmetrically and had no oral residue. He did report biting the right side of his tongue which he reports \"happens all the time because I have an underbite.\" Laryngeal elevation was palpated as complete. He does have a large lump on his neck which he reports is a goiter from hypothyroidism. There were inconsistent remarks and right arm movements made during this evaluation. He reported his teeth were \"super sensitive to cold\" so he had a difficult time chewing ice and fruit cocktail from the fridge (although this was room temperature and has never been in the refrigerator). Then he was noted to put a full cup of ice into his water because \"I like my water cold.\" He was also observed taking his right arm across his body to point to and touch the IV that was in his left arm stating \"this got stuck on the bed last night and it hurt.\" He also moved his right arm from his lap to the outer side of his right leg when I asked him to  his right leg. RN aware of observed behaviors.      Plan  1) Regular textures, thin liquids. Pills whole as tolerated.     Recommend Speech " Therapy 1 time per week for 1 visit for the following treatments:  Dysphagia Training and Patient / Family / Caregiver Education.    Discharge recommendations:  Anticipate that the patient will have no further speech therapy needs after discharge from the hospital.       Objective  Please note that reported impairment below was during oral motor movement on command. See above assessment:     06/21/20 1111   Oral Motor Eval    Is Patient Able to Complete Oral Motor Eval Yes but Impaired   Labial Function   Labial Structure At Rest Within Functional Limits   Labial Vowel Production / I /, / U / Moderate   Frown, Pucker Severe   Lingual Function   Lingual Structure At Rest Within Functional Limits   Lingual Protrude Moderate   Lingual Retract Moderate   Lateralization Moderate Left;Moderate Right   / Pa / 5X's Moderate   / Ta / 5X's Moderate   / Ka / 5X's Moderate   / [place] / 5X's Moderate   Jaw   Jaw Structure At Rest Within Functional Limits   Bite ([place]) Within Functional Limits   Chew ([place]) Moderate   Jaw Open / Resist Minimal   Jaw Close / Resist Minimal   Velar Function   Velar Structure At Rest Within Functional Limits   / A / Prolonged Within Functional Limits   / A /  5X's Within Functional Limits   Laryngeal Function   Voice Quality Within Functional Limits   Volutional Cough Within Functional Limits   Excursion Upon Swallow Complete   Oral Food Presentation   Ice Chips Within Functional Limits   Single Swallow Thin (0) Within Functional Limits   Serial Swallow Thin (0) Within Functional Limits   Liquidised (3) Within Functional Limits   Pureed (4) Within Functional Limits   Soft & Bite-Sized (6) - (Dysphagia III) Within Functional Limits   Regular (7) Minimal   Self Feeding Independent   Dysphagia Strategies / Recommendations   Strategies / Interventions Recommended (Yes / No) Yes   Compensatory Strategies Head of Bed 90 Degrees During Eating / Drinking;Single Sips / Bites   Diet / Liquid  "Recommendation Regular (7);Thin (0)   Medication Administration  Whole with Liquid Wash   Therapy Interventions Dysphagia Therapy By Speech Language Pathologist   Dysphagia Rating   Nutritional Liquid Intake Rating Scale Non thickened beverages   Nutritional Food Intake Rating Scale Total oral diet with no restrictions   Patient / Family Goals   Patient / Family Goal #1 \"A burger with fries\"   Short Term Goals   Short Term Goal # 1 The patient will consume regular textures, thin liquids without signs of aspiration, using precautions independently           "

## 2020-06-21 NOTE — PROGRESS NOTES
[facility] Medicine Daily Progress Note    Date of Service  6/21/2020    Chief Complaint  40 y.o. male admitted 6/20/2020 with sudden onset of right-sided weakness.     Hospital Course    40 y.o. male who presented 6/20/2020 with sudden onset of right-sided weakness.  The patient is outside the window of TPA as the patient apparently fell asleep and woke up with neurological symptoms.  Is impossible to  when they started.  The patient at this point will be admitted to the neurology floor.  He will undergo MRI of the head and echocardiogram.  Will have him on aspirin and statin.  Neurology will be consulted.  The patient will have PT OT evaluation and speech evaluation.  Patient's diabetes will continue to be monitored.  For now he will be on permissive hypertension protocol.  Speech will evaluate him and then we will let him eat if he is able to pass the speech and swallow evaluation.    Interval Problem Update  none    Consultants/Specialty  Neurology  psych    Code Status  full    Disposition  pending    Review of Systems  Review of Systems   Constitutional: Negative for chills, fever and weight loss.   HENT: Negative for ear discharge, ear pain and tinnitus.    Eyes: Negative for photophobia, pain and discharge.   Respiratory: Negative for cough, hemoptysis and sputum production.    Cardiovascular: Negative for chest pain, palpitations and orthopnea.   Gastrointestinal: Negative for heartburn, nausea and vomiting.   Genitourinary: Negative for dysuria, frequency and urgency.   Musculoskeletal: Negative for back pain, myalgias and neck pain.   Neurological: Negative for dizziness, tingling and headaches.        Physical Exam  Temp:  [36.1 °C (97 °F)-36.9 °C (98.5 °F)] 36.3 °C (97.3 °F)  Pulse:  [64-86] 75  Resp:  [16-23] 16  BP: (106-124)/(61-76) 113/74  SpO2:  [94 %-99 %] 99 %    Physical Exam  Constitutional:       Appearance: He is obese.   HENT:      Head: Normocephalic and atraumatic.      Nose: Nose  normal.      Mouth/Throat:      Mouth: Mucous membranes are dry.   Eyes:      Conjunctiva/sclera: Conjunctivae normal.   Neck:      Musculoskeletal: Normal range of motion and neck supple.   Cardiovascular:      Rate and Rhythm: Normal rate and regular rhythm.      Pulses: Normal pulses.      Heart sounds: Normal heart sounds.   Pulmonary:      Effort: Pulmonary effort is normal.      Breath sounds: Normal breath sounds.   Abdominal:      General: Bowel sounds are normal.      Palpations: Abdomen is soft.   Musculoskeletal:         General: Tenderness present. No swelling.   Skin:     General: Skin is warm and dry.   Neurological:      Mental Status: He is alert.      Comments: Right sided hemiparesis         Fluids    Intake/Output Summary (Last 24 hours) at 6/21/2020 1149  Last data filed at 6/20/2020 2300  Gross per 24 hour   Intake --   Output 900 ml   Net -900 ml       Laboratory  Recent Labs     06/20/20  1740   WBC 9.0   RBC 4.57*   HEMOGLOBIN 12.9*   HEMATOCRIT 39.9*   MCV 87.3   MCH 28.2   MCHC 32.3*   RDW 44.0   PLATELETCT 286   MPV 10.0     Recent Labs     06/20/20  1740   SODIUM 136   POTASSIUM 3.9   CHLORIDE 100   CO2 22   GLUCOSE 161*   BUN 18   CREATININE 0.96   CALCIUM 8.8     Recent Labs     06/20/20  1740   APTT 27.6   INR 0.90         Recent Labs     06/21/20  0440   TRIGLYCERIDE 1109*   HDL 27*   LDL see below       Imaging       Assessment/Plan  Right hemiparesis (HCC)- (present on admission)  Assessment & Plan  Neurology recommended psych consult  Asa  lipitor    Head mri showed:1.  No evidence of acute territorial infarct, intracranial hemorrhage or mass lesion.  2.  Mild to moderate diffuse cerebral substance loss.  3.  Unchanged extensive confluent periventricular and juxtacortical white matter signal abnormality consistent with demyelination or dysmyelination, alternatively findings could be seen in the setting of a remote toxic metabolic insult.    HTN (hypertension)- (present on  admission)  Assessment & Plan  normotensive    HLD (hyperlipidemia)- (present on admission)  Assessment & Plan  Low-fat low-cholesterol diet  Statin  Fasting lipid panel    Type 2 diabetes mellitus without complication, without long-term current use of insulin (HCC)- (present on admission)  Assessment & Plan  -accus with sliding scale coverage  -diabetic diet  -diabetic education  hema1c 9.2  -accu check results are noted    PTSD (post-traumatic stress disorder)- (present on admission)  Assessment & Plan  With patient having PTSD continue at this point BuSpar and Zoloft    Anemia- (present on admission)  Assessment & Plan  Anemia is mild.  Currently not in a transfusion zone.  Patient will be evaluated for his H&H if he drops below 7 and 21 he will be transfused.    Class 1 obesity due to excess calories with serious comorbidity and body mass index (BMI) of 34.0 to 34.9 in adult- (present on admission)  Assessment & Plan  Body mass index is 34.55 kg/m².  Patient will need outpatient weight loss management program    Depression- (present on admission)  Assessment & Plan  Chronic depression continue Zoloft and BuSpar  Currently patient is not suicidal or homicidal    History of seizure- (present on admission)  Assessment & Plan  On depakote  seizure precautions    Acquired hypothyroidism- (present on admission)  Assessment & Plan  -supportive measures with synthroid supplementation at 200 Mcg per day, no change  -latest TSH is 3.780 and this is with in establish normal guidlines     Glaucoma- (present on admission)  Assessment & Plan  Continue with Xalatan and Travatan eyedrops       VTE prophylaxis: lovenox

## 2020-06-21 NOTE — PROGRESS NOTES
Monitor summary: SB-SR 54-69, TN .18, QRS .12, QT .42 with bundle branch block per strip from monitor room.

## 2020-06-21 NOTE — PROGRESS NOTES
MRI Brain w/o ischemia.     Symptoms non neurologic.     Consult behavioral health.     Singing off for now.

## 2020-06-21 NOTE — THERAPY
"Physical Therapy   Initial Evaluation     Patient Name: Norm Prabhakar  Age:  40 y.o., Sex:  male  Medical Record #: [id number]  Today's Date: 6/21/2020     Precautions: Fall Risk, Swallow Precautions, R sided deficits    Assessment  Patient is 40 y.o. male admitted for stroke work-up due to R sided weakness.  As per neurology note: \"symptoms are non neurologic.\" PMH includes conversion disorder, DM2, bipolar 1 disorder, anemia, glaucoma. Pt currently requires mod assist with bed mobility and transfers and was unable to ambulate due to weakness on R side. Despite presenting with 1/5 strength on R LE with formal assessment, no R LE buckling noted with transfer from bed>chair. PT will continue to work with patient to address deficits. PT will likely require 27/7 supervision at discharge.     Plan    Recommend Physical Therapy 3 times per week until therapy goals are met for the following treatments:  Bed Mobility, Community Re-integration, Gait Training, Neuro Re-Education / Balance, Self Care/Home Evaluation, Stair Training, Therapeutic Activities and Therapeutic Exercises    Discharge recommendations:  If pt has 24/7 caregiver support at [facility], pt is safe to d/c home with home health for continued physical therapy services. Otherwise, pt may need post- acute rehab prior to return home.       06/21/20 1022   Prior Living Situation   Prior Services Other (Comments)  (\"group home\" called [facility])   Housing / Facility Group Home   Steps Into Home 0   Steps In Home 0   Bathroom Set up Walk In Shower;Grab Bars   Equipment Owned Front-Wheel Walker;Wheelchair   Lives with - Patient's Self Care Capacity Other (Comments)  ([facility] c/g staff avalible 24/7 as per conversation with pt)   Comments Pt reports living in [facility] and stated its a group home but he does everything independently/ Pt has a FWW and WC but typically ambulates without AD. Pt mentioned his fiance but not sure where she comes into the picture.  "   Prior Level of Functional Mobility   Bed Mobility Independent   Transfer Status Independent   Ambulation Independent   Distance Ambulation (Feet)   (household)   Assistive Devices Used Front-Wheel Walker   Wheelchair Independent   Comments Depending on how he feels he uses no AD, FWW or WC    History of Falls   History of Falls Yes   Cognition    Level of Consciousness Alert   Comments hx of conversion disorder, pending psych eval   Active ROM Lower Body    Active ROM Lower Body  X   Comments R LE limited by weakness   Strength Lower Body   Lower Body Strength  X   Comments R LE 1/5 strength when formally assessed but patient was able to stand without buckling   Sensation Lower Body   Lower Extremity Sensation   X   Comments absent sensation on R LE   Gait Analysis   Gait Level Of Assist Unable to Participate   Weight Bearing Status FWB   Vision Deficits Impacting Mobility none   Comments transfers only due to R LE weakness   Bed Mobility    Supine to Sit Moderate Assist   Sit to Supine   (in chair)   Scooting Supervised   Skilled Intervention Verbal Cuing   Comments with bed rails    Functional Mobility   Sit to Stand Moderate Assist  (x2)   Bed, Chair, Wheelchair Transfer Moderate Assist  (x2)   Transfer Method Stand Step  (with therapist in front)   Mobility in room EOB and transfers   Short Term Goals    Short Term Goal # 1 Pt will be able to complete supine<>sitting with HOB flat and SPV in 6tx in order to return to assisted.   Short Term Goal # 2 Pt will be able to complete functional transfers with FWW and SPV in 6tx in order to return to [facility]   Short Term Goal # 3 Pt will be able to ambulate 150ft with FWW and SPV in 6tx in order to return to household ambulating   Anticipated Discharge Equipment   DC Equipment Unable To Determine At This Time

## 2020-06-21 NOTE — ED NOTES
Med rec complete per MAR from [facility]. Unable to review allergies. No ABX taken in past 14 days per MAR. Pt takes ASPIRIN.

## 2020-06-21 NOTE — ED NOTES
Pt arrives from CT scan with stroke RN. Patient A&OX4, RR even and unlabored. Pt has stuttering speech and difficulty speaking. Pt has R arm and R leg weakness. Pt reports asymptomatic at 1430 today before he took a nap. Pt lives at group home in [place]. Pt poor historian.

## 2020-06-21 NOTE — PSYCHIATRY
BRIEF PSYCHIATRIC CONSULT NOTE:  Not seen. Pt was seen 3/1/2020. Please review note. Tthere are no acute symptoms like SI/HI/psychosis noted, he appears stable.   Please refer to outpt psychiatrist for follow up. He has been suspected to have conversion disorder before. This is not going to be resolved or treated on this admit. Therapy is the treatment of choice for conversion. Continue his outpt psychiatric meds and have social service get a follow up with mental health. Thank you.

## 2020-06-21 NOTE — ED PROVIDER NOTES
ED Provider Note    CHIEF COMPLAINT  Chief Complaint   Patient presents with   • Unilateral Weakness       HPI  Norm Prabhakar is a 40 y.o. male here for evaluation of right sided paralysis.  The pt states that he took a nap at 230, and awoke 'like this.'  He has a history of the same, and it usually 'just goes away.'  He has no fever or chills, no vomiting, and no trauma. The pt is not on any anticoagulants, and did not fall. Nothing seems to make his symptoms better or worse, and he only has symptoms on the right side.       ROS  See HPI for further details, o/w negative.     PAST MEDICAL HISTORY   has a past medical history of Anxiety, ASTHMA, Bipolar 1 disorder ([facility]), Depression, Diabetes ([facility]), Fall, Glaucoma, Glaucoma (), Hypothyroidism, Indigestion, Mental disorder, Murmur, Pneumonia, Psychiatric problem (), S/P thyroidectomy, Seizure (HCC) (), Seizure disorder ([facility]), and Unspecified disorder of thyroid.    SOCIAL HISTORY  Social History     Tobacco Use   • Smoking status: Current Some Day Smoker     Packs/day: 0.25     Types: Cigarettes, Cigars     Last attempt to quit: 2018     Years since quittin.1   • Smokeless tobacco: Never Used   Substance and Sexual Activity   • Alcohol use: Not Currently     Frequency: 4 or more times a week     Drinks per session: 3 or 4     Binge frequency: Daily or almost daily   • Drug use: Yes     Types: Inhaled     Comment: marijuana   • Sexual activity: Not on file       Family History  No bleeding disorders     SURGICAL HISTORY   has a past surgical history that includes eye surgery; thyroid lobectomy; and other.    CURRENT MEDICATIONS  Home Medications     Reviewed by Schuyler B Collett, R.N. (Registered Nurse) on 20 at 1745  Med List Status: <None>   Medication Last Dose Status   ALPRAZolam (XANAX) 0.25 MG Tab  Active   aspirin EC (ECOTRIN) 81 MG Tablet Delayed Response  Active   atorvastatin (LIPITOR) 80 MG tablet  Active   busPIRone (BUSPAR) 10  "MG Tab tablet  Active   Cholecalciferol (VITAMIN D) 2000 UNIT Tab  Active   divalproex (DEPAKOTE) 500 MG Tablet Delayed Response  Active   Empagliflozin (JARDIANCE) 25 MG Tab  Active   fenofibrate (ANTARA) 130 MG capsule  Active   glimepiride (AMARYL) 4 MG Tab  Active   hydrOXYzine HCl (ATARAX) 25 MG Tab  Active   insulin glargine (BASAGLAR KWIKPEN) 100 UNIT/ML Solution Pen-injector injection  Active   latanoprost (XALATAN) 0.005 % Solution  Active   LATUDA 20 MG Tab  Active   levothyroxine (SYNTHROID) 200 MCG Tab  Active   lisinopril (PRINIVIL) 10 MG Tab  Active   metformin (GLUCOPHAGE) 1000 MG tablet  Active   montelukast (SINGULAIR) 10 MG Tab  Active   omeprazole (PRILOSEC) 20 MG delayed-release capsule  Active   prazosin (MINIPRESS) 2 MG Cap  Active   raNITidine (ZANTAC) 150 MG Tab  Active   sertraline (ZOLOFT) 100 MG Tab  Active   travoprost (TRAVATAN Z) 0.004 % Solution  Active   traZODone (DESYREL) 50 MG Tab  Active                ALLERGIES  Allergies   Allergen Reactions   • Abilify Unspecified     \"Feeling tired, like I don't even know whats going on around me\"   • Fish      Pt reports fish causes him to be sick to his stomach         REVIEW OF SYSTEMS  See HPI for further details. Review of systems as above, otherwise all other systems are negative.     PHYSICAL EXAM  Constitutional: Well developed, well nourished. mild acute distress.  HEENT: Normocephalic, atraumatic. Posterior pharynx clear and moist.  Eyes:  EOMI. Normal sclera.  Neck: Supple, Full range of motion, nontender.  Chest/Pulmonary: clear to ausculation. Symmetrical expansion.   Cardio: Regular rate and rhythm with no murmur.   Abdomen: Soft, nontender. No peritoneal signs. No guarding. No palpable masses.  Back: No CVA tenderness, nontender midline, no step offs.  Musculoskeletal: No deformity, no edema, neurovascular intact.   Neuro: Clear speech, appropriate, cooperative, cranial nerves II-XII grossly intact.  Psych: Normal mood and " affect    PROCEDURES     MEDICAL RECORD  I have reviewed patient's medical record and pertinent results are listed.    COURSE & MEDICAL DECISION MAKING  I have reviewed any medical record information, laboratory studies and radiographic results as noted above.        Results for orders placed or performed during the hospital encounter of 20   PROTHROMBIN TIME   Result Value Ref Range    PT 12.4 12.0 - 14.6 sec    INR 0.90 0.87 - 1.13   APTT   Result Value Ref Range    APTT 27.6 24.7 - 36.0 sec   EKG (NOW)   Result Value Ref Range    Report       [facility]    Test Date:  2020  Pt Name:    HOLLY KIM                 Department: ER  MRN:        [id number]                      Room:        03  Gender:     Male                         Technician: [id number]  :        1980                   Requested By:BERNICE JAIN  Order #:    [id number]                    Reading MD:    Measurements  Intervals                                Axis  Rate:       74                           P:          52  AZ:         172                          QRS:        6  QRSD:       114                          T:          20  QT:         384  QTc:        426    Interpretive Statements  SINUS RHYTHM  NONSPECIFIC INTRAVENTRICULAR CONDUCTION DELAY  BASELINE WANDER IN LEAD(S) V3  Compared to ECG 2020 22:17:05  No significant changes       CT-CTA NECK WITH & W/O-POST PROCESSING   Final Result      1.  CT angiogram of the neck within normal limits.      2.  2 midline neck hypervascular or hyperdense mass with the more cranial measuring 3.7 x 2.9 x 3.2 cm and the more caudal measuring 1.9 x 1.8 x 1.2 cm.      3.  These could represent thyroid nodule/goiter although no definite thyroid tissue is identified. Alternatively, other neoplastic mass or possible      CT-CTA HEAD WITH & W/O-POST PROCESS   Final Result      CT angiogram of the [place] within normal limits.      CT-CEREBRAL  PERFUSION ANALYSIS   Final Result      1.  Cerebral blood flow less than 30% likely representing completed infarct = 0 mL.      2.  T Max more than 6 seconds likely representing combination of completed infarct and ischemia = 16 mL.      3.  Mismatched volume likely representing ischemic brain/penumbra = 16 mL      4.  Please note that the cerebral perfusion was performed on the limited brain tissue around the basal ganglia region. Infarct/ischemia outside the CT perfusion sections can be missed in this study.      CT-HEAD W/O   Final Result      1.  No acute intracranial abnormality      2.  Severe bilateral confluent white matter changes out of proportion to age, chronic      DX-CHEST-PORTABLE (1 VIEW)    (Results Pending)       5:30 Pm  The pt arrived for a stroke alert.  He will be sent off to the CT scanner.  Neuro NP is here and will let Dr. Soto know.     6:00 pm  Dr. Soto is here and has seen the pt.  The pt his NOT a candidate for alteplase.  His NIH is 14 at this time.  Dr. Soto states the pt has a history of this in the past, and is being worked up for this as well.  He recommends MRI and admit to hospitalist.     6:15 PM  No change in patients presentation.    6:24 PM  Pt will be admitted to the hospitalist, in guarded condition.     CRITICAL CARE  The very real possibility of a deterioration of this patient's condition required the highest level of my preparedness for sudden, emergent intervention.  I provided critical care services, which included medication orders, frequent reevaluations of the patient's condition and response to treatment, ordering and reviewing test results, and discussing the case with various consultants.  The critical care time associated with the care of the patient was 45 minutes. Review chart for interventions. This time is exclusive of any other billable procedures.       FINAL IMPRESSION  Stroke like symptoms  Critical care time 45 minutes.       Electronically  signed by: Candido Villar D.O., 6/20/2020 5:59 PM

## 2020-06-21 NOTE — CARE PLAN
Problem: Communication  Goal: The ability to communicate needs accurately and effectively will improve  Outcome: PROGRESSING AS EXPECTED  Education provided to call when needing assistance. Call light is within reach. Hourly rounding in place.      Problem: Safety  Goal: Will remain free from falls  Outcome: PROGRESSING AS EXPECTED  Patient is flaccid on L side. Education provided to call before ambulating. Call light is within reach. Bed is in a low and locked position with the bed alarm on. Hourly rounding in place.

## 2020-06-21 NOTE — ED NOTES
Received report from [facility]. Upon shift change pt is resting in bed with even and unlabored breaths, in no apparent distress. Will continue to monitor.

## 2020-06-21 NOTE — ASSESSMENT & PLAN NOTE
-accus with sliding scale coverage  -diabetic diet  -diabetic education  hema1c 9.2  -accu check results are noted

## 2020-06-21 NOTE — ASSESSMENT & PLAN NOTE
-supportive measures with synthroid supplementation at 200 Mcg per day, no change  -latest TSH is 3.780 and this is with in establish normal guidlines

## 2020-06-21 NOTE — CARE PLAN
Problem: Fluid Volume:  Goal: Will maintain balanced intake and output  Outcome: PROGRESSING AS EXPECTED  Note: Pt placed in diet today per SLP evaluation. Monitoring pt for adequate po intake.      Problem: Communication  Goal: The ability to communicate needs accurately and effectively will improve  Outcome: PROGRESSING AS EXPECTED  Note: Educated on use of call light. Pt calls appropriately. Able to verbally communicate needs.

## 2020-06-21 NOTE — ED TRIAGE NOTES
Pt presents via ems as a stroke alert for:  Chief Complaint   Patient presents with   • Unilateral Weakness     R sided weakness in upper and lower extremity as well as difficulty speaking. Pt baseline is able to ambulate w/o assistance. Pt reportedly was asymptomatic when going to take a nap at 1430.   Pt poor historian, unable to complete full hx. 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

## 2020-06-22 VITALS
OXYGEN SATURATION: 96 % | HEIGHT: 78 IN | TEMPERATURE: 97 F | RESPIRATION RATE: 17 BRPM | SYSTOLIC BLOOD PRESSURE: 106 MMHG | BODY MASS INDEX: 34.26 KG/M2 | HEART RATE: 64 BPM | WEIGHT: 296.08 LBS | DIASTOLIC BLOOD PRESSURE: 53 MMHG

## 2020-06-22 LAB
ALBUMIN SERPL BCP-MCNC: 4.2 G/DL (ref 3.2–4.9)
ALBUMIN/GLOB SERPL: 1.8 G/DL
ALP SERPL-CCNC: 52 U/L (ref 30–99)
ALT SERPL-CCNC: 21 U/L (ref 2–50)
ANION GAP SERPL CALC-SCNC: 14 MMOL/L (ref 7–16)
AST SERPL-CCNC: 20 U/L (ref 12–45)
BASOPHILS # BLD AUTO: 0.8 % (ref 0–1.8)
BASOPHILS # BLD: 0.08 K/UL (ref 0–0.12)
BILIRUB SERPL-MCNC: 0.2 MG/DL (ref 0.1–1.5)
BUN SERPL-MCNC: 20 MG/DL (ref 8–22)
CALCIUM SERPL-MCNC: 8.9 MG/DL (ref 8.5–10.5)
CHLORIDE SERPL-SCNC: 97 MMOL/L (ref 96–112)
CO2 SERPL-SCNC: 23 MMOL/L (ref 20–33)
CREAT SERPL-MCNC: 0.93 MG/DL (ref 0.5–1.4)
EOSINOPHIL # BLD AUTO: 0.15 K/UL (ref 0–0.51)
EOSINOPHIL NFR BLD: 1.5 % (ref 0–6.9)
ERYTHROCYTE [DISTWIDTH] IN BLOOD BY AUTOMATED COUNT: 43.2 FL (ref 35.9–50)
GLOBULIN SER CALC-MCNC: 2.3 G/DL (ref 1.9–3.5)
GLUCOSE BLD-MCNC: 188 MG/DL (ref 65–99)
GLUCOSE BLD-MCNC: 228 MG/DL (ref 65–99)
GLUCOSE SERPL-MCNC: 187 MG/DL (ref 65–99)
HCT VFR BLD AUTO: 41.6 % (ref 42–52)
HGB BLD-MCNC: 13.7 G/DL (ref 14–18)
IMM GRANULOCYTES # BLD AUTO: 0.29 K/UL (ref 0–0.11)
IMM GRANULOCYTES NFR BLD AUTO: 3 % (ref 0–0.9)
LYMPHOCYTES # BLD AUTO: 3.84 K/UL (ref 1–4.8)
LYMPHOCYTES NFR BLD: 39.2 % (ref 22–41)
MCH RBC QN AUTO: 28.5 PG (ref 27–33)
MCHC RBC AUTO-ENTMCNC: 32.9 G/DL (ref 33.7–35.3)
MCV RBC AUTO: 86.5 FL (ref 81.4–97.8)
MONOCYTES # BLD AUTO: 1.01 K/UL (ref 0–0.85)
MONOCYTES NFR BLD AUTO: 10.3 % (ref 0–13.4)
NEUTROPHILS # BLD AUTO: 4.43 K/UL (ref 1.82–7.42)
NEUTROPHILS NFR BLD: 45.2 % (ref 44–72)
NRBC # BLD AUTO: 0 K/UL
NRBC BLD-RTO: 0 /100 WBC
PLATELET # BLD AUTO: 290 K/UL (ref 164–446)
PMV BLD AUTO: 9.6 FL (ref 9–12.9)
POTASSIUM SERPL-SCNC: 3.8 MMOL/L (ref 3.6–5.5)
PROT SERPL-MCNC: 6.5 G/DL (ref 6–8.2)
RBC # BLD AUTO: 4.81 M/UL (ref 4.7–6.1)
SODIUM SERPL-SCNC: 134 MMOL/L (ref 135–145)
WBC # BLD AUTO: 9.8 K/UL (ref 4.8–10.8)

## 2020-06-22 PROCEDURE — 82962 GLUCOSE BLOOD TEST: CPT

## 2020-06-22 PROCEDURE — 99239 HOSP IP/OBS DSCHRG MGMT >30: CPT | Performed by: FAMILY MEDICINE

## 2020-06-22 PROCEDURE — 80053 COMPREHEN METABOLIC PANEL: CPT

## 2020-06-22 PROCEDURE — 700102 HCHG RX REV CODE 250 W/ 637 OVERRIDE(OP): Performed by: HOSPITALIST

## 2020-06-22 PROCEDURE — 85025 COMPLETE CBC W/AUTO DIFF WBC: CPT

## 2020-06-22 PROCEDURE — 36415 COLL VENOUS BLD VENIPUNCTURE: CPT

## 2020-06-22 PROCEDURE — 700111 HCHG RX REV CODE 636 W/ 250 OVERRIDE (IP): Performed by: FAMILY MEDICINE

## 2020-06-22 PROCEDURE — A9270 NON-COVERED ITEM OR SERVICE: HCPCS | Performed by: HOSPITALIST

## 2020-06-22 RX ADMIN — ASPIRIN 325 MG: 325 TABLET, COATED ORAL at 05:18

## 2020-06-22 RX ADMIN — FENOFIBRATE 134 MG: 134 CAPSULE ORAL at 05:18

## 2020-06-22 RX ADMIN — BUSPIRONE HYDROCHLORIDE 10 MG: 10 TABLET ORAL at 11:50

## 2020-06-22 RX ADMIN — DIVALPROEX SODIUM 500 MG: 500 TABLET, DELAYED RELEASE ORAL at 05:18

## 2020-06-22 RX ADMIN — ENOXAPARIN SODIUM 40 MG: 100 INJECTION SUBCUTANEOUS at 05:17

## 2020-06-22 RX ADMIN — INSULIN HUMAN 3 UNITS: 100 INJECTION, SOLUTION PARENTERAL at 05:26

## 2020-06-22 RX ADMIN — BUSPIRONE HYDROCHLORIDE 10 MG: 10 TABLET ORAL at 05:18

## 2020-06-22 RX ADMIN — INSULIN HUMAN 4 UNITS: 100 INJECTION, SOLUTION PARENTERAL at 11:47

## 2020-06-22 RX ADMIN — SERTRALINE 100 MG: 100 TABLET, FILM COATED ORAL at 05:18

## 2020-06-22 RX ADMIN — LEVOTHYROXINE SODIUM 200 MCG: 200 TABLET ORAL at 05:18

## 2020-06-22 NOTE — CARE PLAN
Problem: Safety  Goal: Will remain free from injury  Outcome: PROGRESSING AS EXPECTED  Note: Educated pt on stroke POC and fall risk. Assessed pts understanding of using call light for assistance. Education for stroke provided. Fall precautions in place. Call light within reach, appropriate signage placed, treaded socks and bed alarm on.        Problem: Pain Management  Goal: Pain level will decrease to patient's comfort goal  Outcome: PROGRESSING AS EXPECTED  Flowsheets  Taken 6/21/2020 1931 by Pooja Roberson R.N.  Comfort Goal: Sleep Comfortably  Pain Rating Scale (NPRS): 9  Taken 6/21/2020 1022 by Lexy Jimenez, PT  Therapist Pain Assessment:   During Activity   0   Nurse Notified  Note: Pt educated on non-pharmacologic pain management measures. PRN pain medication available. Continuously assessing pts pain rating and need for intervention.

## 2020-06-22 NOTE — PROGRESS NOTES
When entered room to speak with patient about discharge, patient was seen using his right arm holding his phone and texting. On AM assessment patient was unable to move his right side and stated that he could not feel his right side.

## 2020-06-22 NOTE — DISCHARGE PLANNING
GABY meier made follow up appointment for psychiatry with [facility], Dr. Cassidy. Thursday 6/25 at 11:45am.

## 2020-06-22 NOTE — PROGRESS NOTES
Pt given DC instructions, medication education & information on follow up appointments and importance of seeking medical attention if needed. Pt verbalized understanding of all information given. All lines/devices discontinued without complications. Patient discharged via wheelchair by medical transportation. All belongings sent home with patient.       Patient was seen moving right leg in bed, and also right arm and was able to put shirt on, and RN observed resistance and downward pulling  from patient's right arm when trying to help put on shirt. Patient stated that he would be okay getting back to his house via [facility] transport. Patient was able to stand and get to wheelchair from bed, and also into medical transport.

## 2020-06-22 NOTE — PROGRESS NOTES
Monitor Summary: SR 60-89, MD -0.18, QRS -0.12, QT -0.38, with rare PAC per strip from the monitor room.

## 2020-06-22 NOTE — DISCHARGE SUMMARY
Discharge Summary    CHIEF COMPLAINT ON ADMISSION  Chief Complaint   Patient presents with   • Unilateral Weakness       Reason for Admission  EMS     Admission Date  6/20/2020    CODE STATUS  Full Code    HPI & HOSPITAL COURSE  This is a 40 y.o. male here with  sudden onset of right-sided weakness.  The patient is outside the window of TPA as the patient apparently fell asleep and woke up with neurological symptoms.  Is impossible to  when they started.  The patient at this point will be admitted to the neurology floor.  He will undergo MRI of the head and echocardiogram.  Will have him on aspirin and statin.  Neurology will be consulted.  The patient will have PT OT evaluation and speech evaluation.  Patient's diabetes will continue to be monitored.  For now he will be on permissive hypertension protocol.  Speech will evaluate him and then we will let him eat if he is able to pass the speech and swallow evaluation..  He was admitted and seen by neurologist and recommended head mri.  Head mri showed:1.  No evidence of acute territorial infarct, intracranial hemorrhage or mass lesion.  2.  Mild to moderate diffuse cerebral substance loss.  3.  Unchanged extensive confluent periventricular and juxtacortical white matter signal abnormality consistent with demyelination or dysmyelination, alternatively findings could be seen in the setting of a remote toxic metabolic insult..  Neurologist recommended psych consult and psych saw the pt and recommended outpt f/u for conversion disorder.      Therefore, he is discharged in good and stable condition to home with close outpatient follow-up.    The patient met 2-midnight criteria for an inpatient stay at the time of discharge.    Discharge Date  6/22/20    FOLLOW UP ITEMS POST DISCHARGE  Psych this week    DISCHARGE DIAGNOSES  Active Problems:    Right hemiparesis (HCC) POA: Yes    Anemia POA: Yes    PTSD (post-traumatic stress disorder) POA: Yes      Overview: IMO load  March 2020    Type 2 diabetes mellitus without complication, without long-term current use of insulin (HCC) POA: Yes    HLD (hyperlipidemia) POA: Yes    HTN (hypertension) POA: Yes    Glaucoma POA: Yes    Acquired hypothyroidism POA: Yes    History of seizure POA: Yes    Depression POA: Yes    Class 1 obesity due to excess calories with serious comorbidity and body mass index (BMI) of 34.0 to 34.9 in adult POA: Yes  Resolved Problems:    * No resolved hospital problems. *      FOLLOW UP  Future Appointments   Date Time Provider Department Center   10/16/2020  1:00 PM ROSA Redd None     No follow-up provider specified.    MEDICATIONS ON DISCHARGE     Medication List      CONTINUE taking these medications      Instructions   acetaminophen 500 MG Tabs  Commonly known as:  TYLENOL   Take 1,000 mg by mouth 2 times a day as needed (headache).  Dose:  1,000 mg     ALPRAZolam 0.25 MG Tabs  Commonly known as:  XANAX   Take 0.25 mg by mouth 1 time daily as needed.  Dose:  0.25 mg     aspirin EC 81 MG Tbec  Commonly known as:  ECOTRIN   Take 81 mg by mouth every morning.  Dose:  81 mg     atorvastatin 80 MG tablet  Commonly known as:  LIPITOR   Take 80 mg by mouth every evening.  Dose:  80 mg     budesonide-formoterol 160-4.5 MCG/ACT Aero  Commonly known as:  SYMBICORT   Inhale 1 Puff by mouth 2 Times a Day.  Dose:  1 Puff     busPIRone 10 MG Tabs tablet  Commonly known as:  BUSPAR   Take 10 mg by mouth 3 times a day.  Dose:  10 mg     divalproex 500 MG Tbec  Commonly known as:  DEPAKOTE   Take 500-1,500 mg by mouth 2 Times a Day. 500mg in AM  1500mg at PM  Dose:  500-1,500 mg     famotidine 20 MG Tabs  Commonly known as:  PEPCID   Take 10 mg by mouth 2 times a day. For GERD  One half tablet = 10 mg  Dose:  10 mg     fenofibrate 130 MG capsule  Commonly known as:  ANTARA   Take 130 mg by mouth every evening.  Dose:  130 mg     glimepiride 4 MG Tabs  Commonly known as:  AMARYL   Take 4 mg by mouth every  "morning.  Dose:  4 mg     hydrOXYzine HCl 25 MG Tabs  Commonly known as:  ATARAX   Take 25 mg by mouth 2 times a day as needed for Anxiety.  Dose:  25 mg     insulin glargine 100 UNIT/ML Sopn injection  Generic drug:  insulin glargine   Inject 20 Units as instructed every evening. Notify MD if FSBS less than 60 and or greater then 400  SELF ADMINISTERS  Dose:  20 Units     Jardiance 25 MG Tabs  Generic drug:  Empagliflozin   Take 25 mg by mouth every day.  Dose:  25 mg     Latuda 20 MG Tabs  Generic drug:  lurasidone   Take 20 mg by mouth every bedtime.  Dose:  20 mg     levothyroxine 200 MCG Tabs  Commonly known as:  SYNTHROID   Take 200 mcg by mouth Every morning on an empty stomach.  Dose:  200 mcg     lisinopril 10 MG Tabs  Commonly known as:  PRINIVIL   Take 10 mg by mouth every day.  Dose:  10 mg     metformin 1000 MG tablet  Commonly known as:  GLUCOPHAGE   Take 1,000 mg by mouth 2 times a day.  Dose:  1,000 mg     montelukast 10 MG Tabs  Commonly known as:  SINGULAIR   Take 10 mg by mouth every evening.  Dose:  10 mg     omeprazole 20 MG delayed-release capsule  Commonly known as:  PRILOSEC   Take 20 mg by mouth 2 times a day.  Dose:  20 mg     prazosin 2 MG Caps  Commonly known as:  MINIPRESS   Take 4 mg by mouth every evening. For nightmares  Dose:  4 mg     sertraline 100 MG Tabs  Commonly known as:  ZOLOFT   Take 100 mg by mouth every day.  Dose:  100 mg     traZODone 50 MG Tabs  Commonly known as:  DESYREL   Take 50 mg by mouth at bedtime as needed.  Dose:  50 mg     vitamin D 2000 UNIT Tabs   Take 4,000 Units by mouth every bedtime.  Dose:  4,000 Units            Allergies  Allergies   Allergen Reactions   • Abilify Unspecified     \"Feeling tired, like I don't even know whats going on around me\"   • Fish      Pt reports fish causes him to be sick to his stomach         DIET  Orders Placed This Encounter   Procedures   • Diet Order 2 Gram Sodium     Standing Status:   Standing     Number of Occurrences: "   1     Order Specific Question:   Diet:     Answer:   2 Gram Sodium [7]       ACTIVITY  As tolerated.  Weight bearing as tolerated    CONSULTATIONS  Neurology  psych    PROCEDURES  none    LABORATORY  Lab Results   Component Value Date    SODIUM 134 (L) 06/22/2020    POTASSIUM 3.8 06/22/2020    CHLORIDE 97 06/22/2020    CO2 23 06/22/2020    GLUCOSE 187 (H) 06/22/2020    BUN 20 06/22/2020    CREATININE 0.93 06/22/2020        Lab Results   Component Value Date    WBC 9.8 06/22/2020    HEMOGLOBIN 13.7 (L) 06/22/2020    HEMATOCRIT 41.6 (L) 06/22/2020    PLATELETCT 290 06/22/2020        Total time of the discharge process exceeds 35 minutes.

## 2020-06-22 NOTE — PROGRESS NOTES
Spiritual Care Note    Patient's Name: Norm Prabhakar   MRN: [id number]    YOB: 1980   Age and Gender: 40 y.o. male   Service Area: Neuro   Room (and Bed): [place] [address]   Ethnicity or Nationality: White   Primary Language: English   Tenriism/Spiritual preference: Faith   Place of Residence: [place]   Family/Friends/Others Present: No   Clinical Team Present: No   Medical Diagnosis(-es)/Procedure(s): Acute CVA   Code Status: Full Code    Date of Admission: 6/20/2020   Length of Stay: 2 days        Spiritual Care Provider Information    Name of Spiritual Care Provider:   Ashlie Villanueva  Title of Spiritual Care Provider:     Phone Number:     [phone]  E-mail:      [email or web address]  Total Time:      20 minutes      Spiritual Screen Results    Gen Nursing    Is your spiritual health or inner well-being important to you as you cope with your medical condition?: Yes  Would you like to receive a visit from our Spiritual Care team or your own Shinto or spiritual leader?: Yes  Was spiritual care education provided to the patient?: Yes  Cultural/Spiritual Needs During Care: Ceremonial  Ceremonial Considerations: Prayer     Palliative Care    Was spiritual care education provided to the patient?: Yes      Encounter/Request Information    Visited With: Patient  Nature of the Visit: Initial, On shift  General Visit: Yes  Referral From/ Origin of Request: Epic nursing      Religous Needs/Values  Tenriism Needs Visit  Tenriism Needs: Literature/Devotioal material      Spiritual Assessment     Observations/Symptoms: Accepting, Confessing, Discouraged    Interacton/Conversation: PT spoke of his nori history, his family system, and his hopes for the future. He is presently searching for a Faith Religion community that will provide spiritual support. PT asked for a Bible.    Assessment: Need   Need: Cultural Issues, Seeking Spiritual Assistance and Support    Intended Effects: Convey a  Calming Presence, Demonstrate Caring and Concern, Helping Someone Feel Comforted, Preserve Dignity and Respect, Promote a Sense of Peace    Interventions: Compassionate presence, active listening    Outcomes: Ability to Communicate with Truth and Honesty, Love/Belonging/ Compassion/Kindness/Acceptance, Progress with Grief,   Spiritual Comfort, Value/Dignity/Respect    Plan: Visit Upon Request    Notes:    Thank you for allowing Spiritual Care to support this patient and family. Contact [phone] for additional assistance, changes in PT status, or with any questions/concerns.

## 2020-08-31 ENCOUNTER — APPOINTMENT (OUTPATIENT)
Dept: RADIOLOGY | Facility: MEDICAL CENTER | Age: 40
End: 2020-08-31
Attending: EMERGENCY MEDICINE
Payer: MEDICAID

## 2020-08-31 ENCOUNTER — HOSPITAL ENCOUNTER (OUTPATIENT)
Facility: MEDICAL CENTER | Age: 40
End: 2020-09-03
Attending: EMERGENCY MEDICINE | Admitting: HOSPITALIST
Payer: MEDICAID

## 2020-08-31 ENCOUNTER — APPOINTMENT (OUTPATIENT)
Dept: RADIOLOGY | Facility: MEDICAL CENTER | Age: 40
End: 2020-08-31
Attending: HOSPITALIST
Payer: MEDICAID

## 2020-08-31 DIAGNOSIS — R56.9 SEIZURE (HCC): ICD-10-CM

## 2020-08-31 DIAGNOSIS — R53.1 WEAKNESS: ICD-10-CM

## 2020-08-31 DIAGNOSIS — R53.1 GENERALIZED WEAKNESS: ICD-10-CM

## 2020-08-31 DIAGNOSIS — F99 PSYCHIATRIC DISORDER: ICD-10-CM

## 2020-08-31 DIAGNOSIS — E86.0 DEHYDRATION: ICD-10-CM

## 2020-08-31 PROBLEM — G40.909 SEIZURE DISORDER (HCC): Status: ACTIVE | Noted: 2020-08-31

## 2020-08-31 LAB
ALBUMIN SERPL BCP-MCNC: 4.3 G/DL (ref 3.2–4.9)
ALBUMIN/GLOB SERPL: 2.3 G/DL
ALP SERPL-CCNC: 50 U/L (ref 30–99)
ALT SERPL-CCNC: 18 U/L (ref 2–50)
AMPHET UR QL SCN: NEGATIVE
ANION GAP SERPL CALC-SCNC: 13 MMOL/L (ref 7–16)
APPEARANCE UR: CLEAR
AST SERPL-CCNC: 17 U/L (ref 12–45)
B-OH-BUTYR SERPL-MCNC: 0.25 MMOL/L (ref 0.02–0.27)
BARBITURATES UR QL SCN: NEGATIVE
BASOPHILS # BLD AUTO: 0.9 % (ref 0–1.8)
BASOPHILS # BLD: 0.07 K/UL (ref 0–0.12)
BENZODIAZ UR QL SCN: NEGATIVE
BILIRUB SERPL-MCNC: 0.4 MG/DL (ref 0.1–1.5)
BILIRUB UR QL STRIP.AUTO: NEGATIVE
BUN SERPL-MCNC: 24 MG/DL (ref 8–22)
BZE UR QL SCN: NEGATIVE
CALCIUM SERPL-MCNC: 8.9 MG/DL (ref 8.4–10.2)
CANNABINOIDS UR QL SCN: NEGATIVE
CHLORIDE SERPL-SCNC: 107 MMOL/L (ref 96–112)
CO2 SERPL-SCNC: 18 MMOL/L (ref 20–33)
COLOR UR: YELLOW
COVID ORDER STATUS COVID19: NORMAL
CREAT SERPL-MCNC: 1.19 MG/DL (ref 0.5–1.4)
EKG IMPRESSION: NORMAL
EOSINOPHIL # BLD AUTO: 0.16 K/UL (ref 0–0.51)
EOSINOPHIL NFR BLD: 2 % (ref 0–6.9)
ERYTHROCYTE [DISTWIDTH] IN BLOOD BY AUTOMATED COUNT: 46.9 FL (ref 35.9–50)
GLOBULIN SER CALC-MCNC: 1.9 G/DL (ref 1.9–3.5)
GLUCOSE BLD-MCNC: 215 MG/DL (ref 65–99)
GLUCOSE SERPL-MCNC: 205 MG/DL (ref 65–99)
GLUCOSE UR STRIP.AUTO-MCNC: 500 MG/DL
HCT VFR BLD AUTO: 36 % (ref 42–52)
HGB BLD-MCNC: 11.8 G/DL (ref 14–18)
IMM GRANULOCYTES # BLD AUTO: 0.07 K/UL (ref 0–0.11)
IMM GRANULOCYTES NFR BLD AUTO: 0.9 % (ref 0–0.9)
KETONES UR STRIP.AUTO-MCNC: NEGATIVE MG/DL
LACTATE BLD-SCNC: 1.4 MMOL/L (ref 0.5–2)
LACTATE BLD-SCNC: 2.1 MMOL/L (ref 0.5–2)
LEUKOCYTE ESTERASE UR QL STRIP.AUTO: NEGATIVE
LYMPHOCYTES # BLD AUTO: 2.47 K/UL (ref 1–4.8)
LYMPHOCYTES NFR BLD: 30.1 % (ref 22–41)
MAGNESIUM SERPL-MCNC: 2 MG/DL (ref 1.5–2.5)
MCH RBC QN AUTO: 28.3 PG (ref 27–33)
MCHC RBC AUTO-ENTMCNC: 32.8 G/DL (ref 33.7–35.3)
MCV RBC AUTO: 86.3 FL (ref 81.4–97.8)
METHADONE UR QL SCN: NEGATIVE
MICRO URNS: ABNORMAL
MONOCYTES # BLD AUTO: 0.83 K/UL (ref 0–0.85)
MONOCYTES NFR BLD AUTO: 10.1 % (ref 0–13.4)
NEUTROPHILS # BLD AUTO: 4.6 K/UL (ref 1.82–7.42)
NEUTROPHILS NFR BLD: 56 % (ref 44–72)
NITRITE UR QL STRIP.AUTO: NEGATIVE
NRBC # BLD AUTO: 0 K/UL
NRBC BLD-RTO: 0 /100 WBC
OPIATES UR QL SCN: NEGATIVE
OXYCODONE UR QL SCN: NEGATIVE
PCP UR QL SCN: NEGATIVE
PH UR STRIP.AUTO: 5.5 [PH] (ref 5–8)
PLATELET # BLD AUTO: 249 K/UL (ref 164–446)
PMV BLD AUTO: 10.3 FL (ref 9–12.9)
POTASSIUM SERPL-SCNC: 4.5 MMOL/L (ref 3.6–5.5)
PROLACTIN SERPL-MCNC: 29.5 NG/ML (ref 2.1–17.7)
PROPOXYPH UR QL SCN: NEGATIVE
PROT SERPL-MCNC: 6.2 G/DL (ref 6–8.2)
PROT UR QL STRIP: NEGATIVE MG/DL
RBC # BLD AUTO: 4.17 M/UL (ref 4.7–6.1)
RBC UR QL AUTO: NEGATIVE
SARS-COV-2 RNA RESP QL NAA+PROBE: NOTDETECTED
SODIUM SERPL-SCNC: 138 MMOL/L (ref 135–145)
SP GR UR STRIP.AUTO: 1.02
SPECIMEN SOURCE: NORMAL
TROPONIN T SERPL-MCNC: 14 NG/L (ref 6–19)
WBC # BLD AUTO: 8.2 K/UL (ref 4.8–10.8)

## 2020-08-31 PROCEDURE — 82010 KETONE BODYS QUAN: CPT

## 2020-08-31 PROCEDURE — 96372 THER/PROPH/DIAG INJ SC/IM: CPT | Mod: XU

## 2020-08-31 PROCEDURE — 83605 ASSAY OF LACTIC ACID: CPT

## 2020-08-31 PROCEDURE — U0003 INFECTIOUS AGENT DETECTION BY NUCLEIC ACID (DNA OR RNA); SEVERE ACUTE RESPIRATORY SYNDROME CORONAVIRUS 2 (SARS-COV-2) (CORONAVIRUS DISEASE [COVID-19]), AMPLIFIED PROBE TECHNIQUE, MAKING USE OF HIGH THROUGHPUT TECHNOLOGIES AS DESCRIBED BY CMS-2020-01-R: HCPCS

## 2020-08-31 PROCEDURE — 80053 COMPREHEN METABOLIC PANEL: CPT

## 2020-08-31 PROCEDURE — 99220 PR INITIAL OBSERVATION CARE,LEVL III: CPT | Performed by: HOSPITALIST

## 2020-08-31 PROCEDURE — 36415 COLL VENOUS BLD VENIPUNCTURE: CPT

## 2020-08-31 PROCEDURE — 70553 MRI BRAIN STEM W/O & W/DYE: CPT

## 2020-08-31 PROCEDURE — A9576 INJ PROHANCE MULTIPACK: HCPCS | Performed by: HOSPITALIST

## 2020-08-31 PROCEDURE — 700105 HCHG RX REV CODE 258: Performed by: EMERGENCY MEDICINE

## 2020-08-31 PROCEDURE — 82962 GLUCOSE BLOOD TEST: CPT

## 2020-08-31 PROCEDURE — 70450 CT HEAD/BRAIN W/O DYE: CPT

## 2020-08-31 PROCEDURE — 700102 HCHG RX REV CODE 250 W/ 637 OVERRIDE(OP): Performed by: HOSPITALIST

## 2020-08-31 PROCEDURE — C9803 HOPD COVID-19 SPEC COLLECT: HCPCS | Performed by: HOSPITALIST

## 2020-08-31 PROCEDURE — 84484 ASSAY OF TROPONIN QUANT: CPT

## 2020-08-31 PROCEDURE — 85025 COMPLETE CBC W/AUTO DIFF WBC: CPT

## 2020-08-31 PROCEDURE — 94760 N-INVAS EAR/PLS OXIMETRY 1: CPT

## 2020-08-31 PROCEDURE — 84146 ASSAY OF PROLACTIN: CPT

## 2020-08-31 PROCEDURE — 81003 URINALYSIS AUTO W/O SCOPE: CPT

## 2020-08-31 PROCEDURE — 93005 ELECTROCARDIOGRAM TRACING: CPT

## 2020-08-31 PROCEDURE — 99285 EMERGENCY DEPT VISIT HI MDM: CPT

## 2020-08-31 PROCEDURE — 72156 MRI NECK SPINE W/O & W/DYE: CPT

## 2020-08-31 PROCEDURE — 83735 ASSAY OF MAGNESIUM: CPT

## 2020-08-31 PROCEDURE — 80307 DRUG TEST PRSMV CHEM ANLYZR: CPT

## 2020-08-31 PROCEDURE — A9270 NON-COVERED ITEM OR SERVICE: HCPCS | Performed by: HOSPITALIST

## 2020-08-31 PROCEDURE — G0378 HOSPITAL OBSERVATION PER HR: HCPCS

## 2020-08-31 PROCEDURE — 93005 ELECTROCARDIOGRAM TRACING: CPT | Performed by: EMERGENCY MEDICINE

## 2020-08-31 PROCEDURE — 700117 HCHG RX CONTRAST REV CODE 255: Performed by: HOSPITALIST

## 2020-08-31 RX ORDER — PROCHLORPERAZINE EDISYLATE 5 MG/ML
5-10 INJECTION INTRAMUSCULAR; INTRAVENOUS EVERY 4 HOURS PRN
Status: DISCONTINUED | OUTPATIENT
Start: 2020-08-31 | End: 2020-09-03 | Stop reason: HOSPADM

## 2020-08-31 RX ORDER — ONDANSETRON 4 MG/1
4 TABLET, ORALLY DISINTEGRATING ORAL EVERY 4 HOURS PRN
Status: DISCONTINUED | OUTPATIENT
Start: 2020-08-31 | End: 2020-09-03 | Stop reason: HOSPADM

## 2020-08-31 RX ORDER — LEVOTHYROXINE SODIUM 0.05 MG/1
25 TABLET ORAL
Status: DISCONTINUED | OUTPATIENT
Start: 2020-09-01 | End: 2020-09-03 | Stop reason: HOSPADM

## 2020-08-31 RX ORDER — MONTELUKAST SODIUM 10 MG/1
10 TABLET ORAL EVERY EVENING
Status: DISCONTINUED | OUTPATIENT
Start: 2020-08-31 | End: 2020-09-03 | Stop reason: HOSPADM

## 2020-08-31 RX ORDER — ALBUTEROL SULFATE 90 UG/1
2 AEROSOL, METERED RESPIRATORY (INHALATION) EVERY 6 HOURS PRN
COMMUNITY
End: 2020-10-16

## 2020-08-31 RX ORDER — DIVALPROEX SODIUM 250 MG/1
500 TABLET, DELAYED RELEASE ORAL DAILY
Status: DISCONTINUED | OUTPATIENT
Start: 2020-09-01 | End: 2020-09-02

## 2020-08-31 RX ORDER — SODIUM CHLORIDE 9 MG/ML
1000 INJECTION, SOLUTION INTRAVENOUS ONCE
Status: COMPLETED | OUTPATIENT
Start: 2020-08-31 | End: 2020-08-31

## 2020-08-31 RX ORDER — PROMETHAZINE HYDROCHLORIDE 25 MG/1
12.5-25 TABLET ORAL EVERY 4 HOURS PRN
Status: DISCONTINUED | OUTPATIENT
Start: 2020-08-31 | End: 2020-09-03 | Stop reason: HOSPADM

## 2020-08-31 RX ORDER — LISINOPRIL 5 MG/1
10 TABLET ORAL DAILY
Status: DISCONTINUED | OUTPATIENT
Start: 2020-09-01 | End: 2020-09-03 | Stop reason: HOSPADM

## 2020-08-31 RX ORDER — GLIMEPIRIDE 2 MG/1
4 TABLET ORAL EVERY MORNING
Status: DISCONTINUED | OUTPATIENT
Start: 2020-08-31 | End: 2020-09-03 | Stop reason: HOSPADM

## 2020-08-31 RX ORDER — ONDANSETRON 2 MG/ML
4 INJECTION INTRAMUSCULAR; INTRAVENOUS EVERY 4 HOURS PRN
Status: DISCONTINUED | OUTPATIENT
Start: 2020-08-31 | End: 2020-09-03 | Stop reason: HOSPADM

## 2020-08-31 RX ORDER — TOPIRAMATE 25 MG/1
25 TABLET ORAL DAILY
Status: DISCONTINUED | OUTPATIENT
Start: 2020-09-01 | End: 2020-09-02

## 2020-08-31 RX ORDER — BISACODYL 10 MG
10 SUPPOSITORY, RECTAL RECTAL
Status: DISCONTINUED | OUTPATIENT
Start: 2020-08-31 | End: 2020-09-03 | Stop reason: HOSPADM

## 2020-08-31 RX ORDER — LEVOTHYROXINE SODIUM 0.05 MG/1
200 TABLET ORAL
Status: DISCONTINUED | OUTPATIENT
Start: 2020-09-01 | End: 2020-09-03 | Stop reason: HOSPADM

## 2020-08-31 RX ORDER — INSULIN GLARGINE 100 [IU]/ML
20 INJECTION, SOLUTION SUBCUTANEOUS EVERY EVENING
Status: DISCONTINUED | OUTPATIENT
Start: 2020-08-31 | End: 2020-09-03 | Stop reason: HOSPADM

## 2020-08-31 RX ORDER — DEXTROSE MONOHYDRATE 25 G/50ML
50 INJECTION, SOLUTION INTRAVENOUS
Status: DISCONTINUED | OUTPATIENT
Start: 2020-08-31 | End: 2020-09-03 | Stop reason: HOSPADM

## 2020-08-31 RX ORDER — ACETAMINOPHEN 325 MG/1
650 TABLET ORAL EVERY 6 HOURS PRN
Status: DISCONTINUED | OUTPATIENT
Start: 2020-08-31 | End: 2020-09-03 | Stop reason: HOSPADM

## 2020-08-31 RX ORDER — LEVOTHYROXINE SODIUM 0.03 MG/1
25 TABLET ORAL
COMMUNITY
End: 2021-02-03

## 2020-08-31 RX ORDER — ATORVASTATIN CALCIUM 40 MG/1
80 TABLET, FILM COATED ORAL EVERY EVENING
Status: DISCONTINUED | OUTPATIENT
Start: 2020-08-31 | End: 2020-09-03 | Stop reason: HOSPADM

## 2020-08-31 RX ORDER — PROMETHAZINE HYDROCHLORIDE 25 MG/1
12.5-25 SUPPOSITORY RECTAL EVERY 4 HOURS PRN
Status: DISCONTINUED | OUTPATIENT
Start: 2020-08-31 | End: 2020-09-03 | Stop reason: HOSPADM

## 2020-08-31 RX ORDER — INSULIN GLARGINE 100 [IU]/ML
15 INJECTION, SOLUTION SUBCUTANEOUS
Status: DISCONTINUED | OUTPATIENT
Start: 2020-09-01 | End: 2020-09-03 | Stop reason: HOSPADM

## 2020-08-31 RX ORDER — BUDESONIDE AND FORMOTEROL FUMARATE DIHYDRATE 160; 4.5 UG/1; UG/1
2 AEROSOL RESPIRATORY (INHALATION) 2 TIMES DAILY
Status: DISCONTINUED | OUTPATIENT
Start: 2020-08-31 | End: 2020-09-03 | Stop reason: HOSPADM

## 2020-08-31 RX ORDER — CLONIDINE HYDROCHLORIDE 0.1 MG/1
0.1 TABLET ORAL EVERY 6 HOURS PRN
Status: DISCONTINUED | OUTPATIENT
Start: 2020-08-31 | End: 2020-09-03 | Stop reason: HOSPADM

## 2020-08-31 RX ORDER — LABETALOL HYDROCHLORIDE 5 MG/ML
10 INJECTION, SOLUTION INTRAVENOUS EVERY 4 HOURS PRN
Status: DISCONTINUED | OUTPATIENT
Start: 2020-08-31 | End: 2020-09-03 | Stop reason: HOSPADM

## 2020-08-31 RX ORDER — POLYETHYLENE GLYCOL 3350 17 G/17G
1 POWDER, FOR SOLUTION ORAL
Status: DISCONTINUED | OUTPATIENT
Start: 2020-08-31 | End: 2020-09-03 | Stop reason: HOSPADM

## 2020-08-31 RX ORDER — PRAZOSIN HYDROCHLORIDE 1 MG/1
4 CAPSULE ORAL EVERY EVENING
Status: DISCONTINUED | OUTPATIENT
Start: 2020-08-31 | End: 2020-09-03 | Stop reason: HOSPADM

## 2020-08-31 RX ORDER — FENOFIBRATE 134 MG/1
130 CAPSULE ORAL EVERY EVENING
Status: DISCONTINUED | OUTPATIENT
Start: 2020-08-31 | End: 2020-09-03 | Stop reason: HOSPADM

## 2020-08-31 RX ORDER — HYDROXYZINE HYDROCHLORIDE 25 MG/1
25 TABLET, FILM COATED ORAL 2 TIMES DAILY PRN
Status: DISCONTINUED | OUTPATIENT
Start: 2020-08-31 | End: 2020-09-03 | Stop reason: HOSPADM

## 2020-08-31 RX ORDER — AMOXICILLIN 250 MG
2 CAPSULE ORAL 2 TIMES DAILY
Status: DISCONTINUED | OUTPATIENT
Start: 2020-08-31 | End: 2020-09-03 | Stop reason: HOSPADM

## 2020-08-31 RX ORDER — DIVALPROEX SODIUM 250 MG/1
1500 TABLET, DELAYED RELEASE ORAL
Status: DISCONTINUED | OUTPATIENT
Start: 2020-08-31 | End: 2020-09-02

## 2020-08-31 RX ORDER — TOPIRAMATE 25 MG/1
25 TABLET ORAL DAILY
Status: ON HOLD | COMMUNITY
End: 2020-09-03 | Stop reason: SDUPTHER

## 2020-08-31 RX ORDER — DIVALPROEX SODIUM 500 MG/1
500-1500 TABLET, DELAYED RELEASE ORAL 2 TIMES DAILY
Status: DISCONTINUED | OUTPATIENT
Start: 2020-08-31 | End: 2020-08-31

## 2020-08-31 RX ORDER — TRAZODONE HYDROCHLORIDE 50 MG/1
100 TABLET ORAL NIGHTLY PRN
Status: DISCONTINUED | OUTPATIENT
Start: 2020-08-31 | End: 2020-09-03 | Stop reason: HOSPADM

## 2020-08-31 RX ORDER — FAMOTIDINE 20 MG/1
10 TABLET, FILM COATED ORAL 2 TIMES DAILY
Status: DISCONTINUED | OUTPATIENT
Start: 2020-08-31 | End: 2020-09-03 | Stop reason: HOSPADM

## 2020-08-31 RX ORDER — ALBUTEROL SULFATE 90 UG/1
2 AEROSOL, METERED RESPIRATORY (INHALATION) EVERY 6 HOURS PRN
Status: DISCONTINUED | OUTPATIENT
Start: 2020-08-31 | End: 2020-09-03 | Stop reason: HOSPADM

## 2020-08-31 RX ORDER — BUSPIRONE HYDROCHLORIDE 5 MG/1
10 TABLET ORAL 3 TIMES DAILY
Status: DISCONTINUED | OUTPATIENT
Start: 2020-08-31 | End: 2020-09-03 | Stop reason: HOSPADM

## 2020-08-31 RX ADMIN — BUSPIRONE HYDROCHLORIDE 10 MG: 5 TABLET ORAL at 20:06

## 2020-08-31 RX ADMIN — GADOTERIDOL 20 ML: 279.3 INJECTION, SOLUTION INTRAVENOUS at 19:36

## 2020-08-31 RX ADMIN — METFORMIN HYDROCHLORIDE 1000 MG: 500 TABLET ORAL at 20:06

## 2020-08-31 RX ADMIN — PRAZOSIN HYDROCHLORIDE 4 MG: 1 CAPSULE ORAL at 20:07

## 2020-08-31 RX ADMIN — FAMOTIDINE 10 MG: 20 TABLET ORAL at 20:05

## 2020-08-31 RX ADMIN — GADOTERIDOL 5 ML: 279.3 INJECTION, SOLUTION INTRAVENOUS at 19:37

## 2020-08-31 RX ADMIN — ACETAMINOPHEN 650 MG: 325 TABLET, FILM COATED ORAL at 20:19

## 2020-08-31 RX ADMIN — ATORVASTATIN CALCIUM 80 MG: 40 TABLET, FILM COATED ORAL at 20:06

## 2020-08-31 RX ADMIN — GLIMEPIRIDE 4 MG: 2 TABLET ORAL at 20:07

## 2020-08-31 RX ADMIN — SODIUM CHLORIDE 1000 ML: 9 INJECTION, SOLUTION INTRAVENOUS at 15:00

## 2020-08-31 RX ADMIN — FENOFIBRATE 134 MG: 134 CAPSULE ORAL at 20:06

## 2020-08-31 RX ADMIN — MONTELUKAST 10 MG: 10 TABLET, FILM COATED ORAL at 20:06

## 2020-08-31 RX ADMIN — BUDESONIDE AND FORMOTEROL FUMARATE DIHYDRATE 2 PUFF: 160; 4.5 AEROSOL RESPIRATORY (INHALATION) at 20:07

## 2020-08-31 RX ADMIN — DIVALPROEX SODIUM 1500 MG: 250 TABLET, DELAYED RELEASE ORAL at 20:05

## 2020-08-31 SDOH — HEALTH STABILITY: MENTAL HEALTH: HOW OFTEN DO YOU HAVE A DRINK CONTAINING ALCOHOL?: 2-3 TIMES A WEEK

## 2020-08-31 SDOH — HEALTH STABILITY: MENTAL HEALTH: HOW MANY STANDARD DRINKS CONTAINING ALCOHOL DO YOU HAVE ON A TYPICAL DAY?: NOT ASKED

## 2020-08-31 SDOH — HEALTH STABILITY: MENTAL HEALTH: HOW OFTEN DO YOU HAVE 6 OR MORE DRINKS ON ONE OCCASION?: NOT ASKED

## 2020-08-31 ASSESSMENT — COGNITIVE AND FUNCTIONAL STATUS - GENERAL
SUGGESTED CMS G CODE MODIFIER MOBILITY: CK
DRESSING REGULAR LOWER BODY CLOTHING: A LITTLE
MOBILITY SCORE: 15
CLIMB 3 TO 5 STEPS WITH RAILING: A LOT
STANDING UP FROM CHAIR USING ARMS: A LOT
SUGGESTED CMS G CODE MODIFIER DAILY ACTIVITY: CJ
DAILY ACTIVITIY SCORE: 21
MOVING TO AND FROM BED TO CHAIR: A LITTLE
MOVING FROM LYING ON BACK TO SITTING ON SIDE OF FLAT BED: A LITTLE
HELP NEEDED FOR BATHING: A LITTLE
TOILETING: A LITTLE
WALKING IN HOSPITAL ROOM: A LOT
TURNING FROM BACK TO SIDE WHILE IN FLAT BAD: A LITTLE

## 2020-08-31 ASSESSMENT — ENCOUNTER SYMPTOMS
WEAKNESS: 1
MYALGIAS: 1
WHEEZING: 0
HEADACHES: 1
ABDOMINAL PAIN: 0
CHILLS: 0
NECK PAIN: 1
DOUBLE VISION: 0
SENSORY CHANGE: 1
VOMITING: 0
SHORTNESS OF BREATH: 1
BLOOD IN STOOL: 0
BACK PAIN: 1
BLURRED VISION: 1
DEPRESSION: 1
SEIZURES: 0
DIAPHORESIS: 0
NAUSEA: 1
INSOMNIA: 1
BRUISES/BLEEDS EASILY: 0
PALPITATIONS: 0
CONSTIPATION: 0
COUGH: 0
HEMOPTYSIS: 0
MEMORY LOSS: 1
FOCAL WEAKNESS: 1
FEVER: 0
DIZZINESS: 1
CARDIOVASCULAR NEGATIVE: 1
TINGLING: 1
LOSS OF CONSCIOUSNESS: 0
HEARTBURN: 0
NERVOUS/ANXIOUS: 0
DIARRHEA: 0

## 2020-08-31 ASSESSMENT — LIFESTYLE VARIABLES
HAVE YOU EVER FELT YOU SHOULD CUT DOWN ON YOUR DRINKING: NO
SUBSTANCE_ABUSE: 1
HOW MANY TIMES IN THE PAST YEAR HAVE YOU HAD 5 OR MORE DRINKS IN A DAY: 0
AVERAGE NUMBER OF DAYS PER WEEK YOU HAVE A DRINK CONTAINING ALCOHOL: 0
TOTAL SCORE: 0
ON A TYPICAL DAY WHEN YOU DRINK ALCOHOL HOW MANY DRINKS DO YOU HAVE: 0
EVER FELT BAD OR GUILTY ABOUT YOUR DRINKING: NO
TOTAL SCORE: 0
HAVE PEOPLE ANNOYED YOU BY CRITICIZING YOUR DRINKING: NO
EVER HAD A DRINK FIRST THING IN THE MORNING TO STEADY YOUR NERVES TO GET RID OF A HANGOVER: NO
CONSUMPTION TOTAL: NEGATIVE
ALCOHOL_USE: YES
TOTAL SCORE: 0
DO YOU DRINK ALCOHOL: NO

## 2020-08-31 ASSESSMENT — FIBROSIS 4 INDEX: FIB4 SCORE: 0.61

## 2020-08-31 NOTE — ED PROVIDER NOTES
ED Provider Note    ED Provider Note    Primary care provider: ANIKET Davis  Means of arrival: EMS  History obtained from: Patient    CHIEF COMPLAINT  Chief Complaint   Patient presents with   • Weakness     weakness and nausea started today  Biba and received 600cc fluid per medic  FSBS 225     Seen at 2:35 PM.   HPI  Norm Prabhakar is a 40 y.o. male who presents to the Emergency Department with a headache, generalized weakness, decreased sensation of the lower extremities, malaise, etc.  The patient states he was in his usual state of health yesterday.  Today for no specific reason he feels that he can no longer walk due to bilateral lower extremity weakness, he also notes decreased sensation lower extremities, tingling in the upper extremities, generalized malaise and a moderate headache.    He denies any fevers, chills, nausea, vomiting, diarrhea, abdominal pain, chest pain.  He does have a history of asthma which gives him shortness of breath and chest tightness at times but today is no worse than usual.    REVIEW OF SYSTEMS  See HPI,   Remainder of ROS negative.     PAST MEDICAL HISTORY   has a past medical history of Anxiety, ASTHMA, Bipolar 1 disorder ([facility]), Depression, Diabetes ([facility]), Fall, Glaucoma, Glaucoma (), Hypothyroidism, Indigestion, Mental disorder, Murmur, Pneumonia, Psychiatric problem (), S/P thyroidectomy, Seizure ([facility]) (), Seizure disorder ([facility]), and Unspecified disorder of thyroid.    SURGICAL HISTORY   has a past surgical history that includes eye surgery; thyroid lobectomy; and other.    SOCIAL HISTORY  Social History     Tobacco Use   • Smoking status: Former Smoker     Packs/day: 0.25     Types: Cigarettes, Cigars     Quit date: 2020     Years since quittin.2   • Smokeless tobacco: Never Used   Substance Use Topics   • Alcohol use: Yes     Frequency: 2-3 times a week   • Drug use: Yes     Types: Inhaled     Comment: marijuana      Social History  "    Substance and Sexual Activity   Drug Use Yes   • Types: Inhaled    Comment: marijuana       FAMILY HISTORY  Family History   Problem Relation Age of Onset   • Hypertension Mother    • Heart Disease Mother    • Lung Disease Mother    • Stroke Maternal Grandmother        CURRENT MEDICATIONS  Reviewed.  See Encounter Summary.     ALLERGIES  Allergies   Allergen Reactions   • Abilify Unspecified     \"Feeling tired, like I don't even know whats going on around me\"   • Fish      Pt reports fish causes him to be sick to his stomach         PHYSICAL EXAM  VITAL SIGNS: /72   Pulse 69   Temp 36.4 °C (97.5 °F) (Temporal)   Resp 18   Ht 1.981 m (6' 6\")   SpO2 93%   BMI 34.22 kg/m²   Constitutional: Awake, alert in no apparent distress.  Obese.  Slow to answer questions.  Not lethargic.  HENT: Normocephalic, Bilateral external ears normal. Nose normal.   Eyes: Conjunctiva normal, non-icteric, EOMI.    Thorax & Lungs: Easy unlabored respirations, Clear to ascultation bilaterally.  Cardiovascular: Regular rate, Regular rhythm, No murmurs, rubs or gallops. Bilateral pulses symmetrical.   Abdomen:  Soft, nontender, nondistended, normal active bowel sounds.   :    Skin: Visualized skin is  Dry, No erythema, No rash.   Musculoskeletal:   No cyanosis, clubbing or edema. No leg asymmetry.   Neurologic: Alert, Grossly non-focal.  Patient gives poor effort bilateral lower extremities, he has no obvious neuro deficits.  Moving upper extremities without difficulty.  Cranial nerves II to XII intact.  Psychiatric: Normal affect, Normal mood  Lymphatic:  No cervical LAD    EKG   12 lead Interpreted by me  Rhythm:  Normal sinus rhythm   Rate: 76  Axis: normal  Ectopy: none  Conduction: normal  ST Segments: no acute change  T Waves: no acute change  Clinical Impression: Normal EKG without acute changes     RADIOLOGY  CT-HEAD W/O   Final Result         NO ACUTE ABNORMALITIES ARE NOTED ON CT SCAN OF THE HEAD.      Prominent " decreased attenuation in the cerebral white matter is again identified with no significant change.            COURSE & MEDICAL DECISION MAKING  Pertinent Labs & Imaging studies reviewed. (See chart for details)    Differential diagnoses include but are not limited to: Most likely generalized malaise, bipolar, conversion disorder, less likely electrolyte abnormalities, DKA, sepsis    2:35 PM - Medical record reviewed, patient with history of bipolar, diabetes.  He was admitted 3 times in the past year for some type of neuro deficit.  There is a discussion about conversion disorder.  He did not receive TPA on any events as he was outside of the window.  MRI has showed atrophy but at no point did he have or have a definitive CVA seen on imaging.    For the medical coders, the patient is hypotensive so he will receive IV fluids.  I do not feel that is appropriate to do a p.o. challenge to systolic blood pressure of 90.  In addition a magnesium level has been ordered for the complaint of generalized weakness.    4:43 PM-following the IV fluids the patient has had excellent urine output, systolic blood pressure is improved as well.  Despite this the patient still continues get very poor effort of lower extremities, he states that he is too weak to walk.    Decision Making:  This is a 40 y.o. year old male who presents with bilateral lower extremity weakness.  I reviewed the medical record, he has been admitted for weakness in the past.  The patient has had 16 MRIs of the brain dating back to 2011.  There has been some findings of signal flair there is nonspecific but no definitive CVA in the past.  His presentation today is not typical for an acute CVA.  I do not feel the TPA is indicated, I feel that the condition presenting today is either MS, nonspecific neuro disease versus some type of psychiatric disorder such as conversion disorder or malingering.  In the past the patient has been admitted and received physical  therapy with resolution of symptoms.  He did have a mild acidosis with a bicarb of 18, slightly elevated creatinine over his baseline and presented with mild hypotension, therefore this is consistent with dehydration.  Do not see any other sign of sepsis.  He received some IV fluids with excellent urine output and improvement of his blood pressure.    Because the patient cannot walk, I feel that it would be extremely challenging to discharge him back to his group home.  Therefore he be admitted for physical therapy, would consider obtaining an MRI though certainly not emergently indicated today.      FINAL IMPRESSION  1. Weakness    2. Dehydration    3. Psychiatric disorder

## 2020-08-31 NOTE — ED NOTES
Med Rec complete per Pt's pharmacy  UNABLE to reach pt by phone last doses unknown    No ABX in the last 14 days

## 2020-09-01 PROBLEM — R56.9 SEIZURE (HCC): Status: ACTIVE | Noted: 2020-08-31

## 2020-09-01 LAB
GLUCOSE BLD-MCNC: 157 MG/DL (ref 65–99)
GLUCOSE BLD-MCNC: 211 MG/DL (ref 65–99)
GLUCOSE BLD-MCNC: 215 MG/DL (ref 65–99)
GLUCOSE BLD-MCNC: 223 MG/DL (ref 65–99)
VALPROATE SERPL-MCNC: 29.6 UG/ML (ref 50–100)

## 2020-09-01 PROCEDURE — 96372 THER/PROPH/DIAG INJ SC/IM: CPT

## 2020-09-01 PROCEDURE — 82962 GLUCOSE BLOOD TEST: CPT | Mod: 91

## 2020-09-01 PROCEDURE — 700102 HCHG RX REV CODE 250 W/ 637 OVERRIDE(OP): Performed by: HOSPITALIST

## 2020-09-01 PROCEDURE — 94664 DEMO&/EVAL PT USE INHALER: CPT

## 2020-09-01 PROCEDURE — 99225 PR SUBSEQUENT OBSERVATION CARE,LEVEL II: CPT | Performed by: INTERNAL MEDICINE

## 2020-09-01 PROCEDURE — A9270 NON-COVERED ITEM OR SERVICE: HCPCS | Performed by: HOSPITALIST

## 2020-09-01 PROCEDURE — G0378 HOSPITAL OBSERVATION PER HR: HCPCS

## 2020-09-01 PROCEDURE — 97162 PT EVAL MOD COMPLEX 30 MIN: CPT

## 2020-09-01 PROCEDURE — 36415 COLL VENOUS BLD VENIPUNCTURE: CPT

## 2020-09-01 PROCEDURE — 97165 OT EVAL LOW COMPLEX 30 MIN: CPT

## 2020-09-01 PROCEDURE — 99407 BEHAV CHNG SMOKING > 10 MIN: CPT

## 2020-09-01 PROCEDURE — 80164 ASSAY DIPROPYLACETIC ACD TOT: CPT

## 2020-09-01 RX ADMIN — METFORMIN HYDROCHLORIDE 1000 MG: 500 TABLET ORAL at 17:39

## 2020-09-01 RX ADMIN — BUSPIRONE HYDROCHLORIDE 10 MG: 5 TABLET ORAL at 04:57

## 2020-09-01 RX ADMIN — ACETAMINOPHEN 650 MG: 325 TABLET, FILM COATED ORAL at 04:11

## 2020-09-01 RX ADMIN — INSULIN GLARGINE 20 UNITS: 100 INJECTION, SOLUTION SUBCUTANEOUS at 17:53

## 2020-09-01 RX ADMIN — PRAZOSIN HYDROCHLORIDE 4 MG: 1 CAPSULE ORAL at 17:41

## 2020-09-01 RX ADMIN — ALBUTEROL SULFATE 2 PUFF: 90 AEROSOL, METERED RESPIRATORY (INHALATION) at 11:26

## 2020-09-01 RX ADMIN — LEVOTHYROXINE SODIUM 200 MCG: 0.05 TABLET ORAL at 04:56

## 2020-09-01 RX ADMIN — BUSPIRONE HYDROCHLORIDE 10 MG: 5 TABLET ORAL at 17:39

## 2020-09-01 RX ADMIN — BUDESONIDE AND FORMOTEROL FUMARATE DIHYDRATE 2 PUFF: 160; 4.5 AEROSOL RESPIRATORY (INHALATION) at 05:11

## 2020-09-01 RX ADMIN — BUSPIRONE HYDROCHLORIDE 10 MG: 5 TABLET ORAL at 11:31

## 2020-09-01 RX ADMIN — BUDESONIDE AND FORMOTEROL FUMARATE DIHYDRATE 2 PUFF: 160; 4.5 AEROSOL RESPIRATORY (INHALATION) at 17:51

## 2020-09-01 RX ADMIN — MONTELUKAST 10 MG: 10 TABLET, FILM COATED ORAL at 17:40

## 2020-09-01 RX ADMIN — GLIMEPIRIDE 4 MG: 2 TABLET ORAL at 04:58

## 2020-09-01 RX ADMIN — FAMOTIDINE 10 MG: 20 TABLET ORAL at 04:57

## 2020-09-01 RX ADMIN — LEVOTHYROXINE SODIUM 25 MCG: 0.05 TABLET ORAL at 05:00

## 2020-09-01 RX ADMIN — FENOFIBRATE 134 MG: 134 CAPSULE ORAL at 17:39

## 2020-09-01 RX ADMIN — TOPIRAMATE 25 MG: 25 TABLET, FILM COATED ORAL at 04:58

## 2020-09-01 RX ADMIN — METFORMIN HYDROCHLORIDE 1000 MG: 500 TABLET ORAL at 04:58

## 2020-09-01 RX ADMIN — DIVALPROEX SODIUM 500 MG: 250 TABLET, DELAYED RELEASE ORAL at 04:58

## 2020-09-01 RX ADMIN — SERTRALINE HYDROCHLORIDE 100 MG: 50 TABLET, FILM COATED ORAL at 04:57

## 2020-09-01 RX ADMIN — INSULIN GLARGINE 15 UNITS: 100 INJECTION, SOLUTION SUBCUTANEOUS at 05:08

## 2020-09-01 RX ADMIN — DIVALPROEX SODIUM 1500 MG: 250 TABLET, DELAYED RELEASE ORAL at 17:59

## 2020-09-01 RX ADMIN — FAMOTIDINE 10 MG: 20 TABLET ORAL at 17:39

## 2020-09-01 RX ADMIN — ATORVASTATIN CALCIUM 80 MG: 40 TABLET, FILM COATED ORAL at 17:38

## 2020-09-01 ASSESSMENT — COGNITIVE AND FUNCTIONAL STATUS - GENERAL
CLIMB 3 TO 5 STEPS WITH RAILING: TOTAL
SUGGESTED CMS G CODE MODIFIER MOBILITY: CL
TOILETING: A LITTLE
DRESSING REGULAR LOWER BODY CLOTHING: A LOT
HELP NEEDED FOR BATHING: A LOT
STANDING UP FROM CHAIR USING ARMS: TOTAL
DAILY ACTIVITIY SCORE: 18
WALKING IN HOSPITAL ROOM: TOTAL
MOBILITY SCORE: 11
DRESSING REGULAR UPPER BODY CLOTHING: A LITTLE
SUGGESTED CMS G CODE MODIFIER DAILY ACTIVITY: CK
MOVING TO AND FROM BED TO CHAIR: A LITTLE
TURNING FROM BACK TO SIDE WHILE IN FLAT BAD: A LITTLE
MOVING FROM LYING ON BACK TO SITTING ON SIDE OF FLAT BED: A LOT

## 2020-09-01 ASSESSMENT — ENCOUNTER SYMPTOMS
MYALGIAS: 0
SHORTNESS OF BREATH: 0
SORE THROAT: 0
COUGH: 0
DIZZINESS: 0
BLURRED VISION: 0
FOCAL WEAKNESS: 0
ABDOMINAL PAIN: 0
FLANK PAIN: 0
BLOOD IN STOOL: 0
DIARRHEA: 0
SPUTUM PRODUCTION: 0
WHEEZING: 0
PALPITATIONS: 0
SEIZURES: 0
BACK PAIN: 0
NECK PAIN: 0
BRUISES/BLEEDS EASILY: 0
WEAKNESS: 1
FEVER: 0
VOMITING: 0
CHILLS: 0
DIAPHORESIS: 0
NAUSEA: 0
HEADACHES: 0

## 2020-09-01 ASSESSMENT — ACTIVITIES OF DAILY LIVING (ADL): TOILETING: INDEPENDENT

## 2020-09-01 ASSESSMENT — GAIT ASSESSMENTS: GAIT LEVEL OF ASSIST: UNABLE TO PARTICIPATE

## 2020-09-01 NOTE — FLOWSHEET NOTE
08/31/20 2115   Vital Signs   Pulse 62   Respiration 18   Pulse Oximetry 95 %   $ Pulse Oximetry (Spot Check) Yes   Respiratory Assessment   Level of Consciousness Alert   Breath Sounds   RUL Breath Sounds Clear   RML Breath Sounds Diminished   RLL Breath Sounds Diminished   LEONOR Breath Sounds Diminished   LLL Breath Sounds Diminished   Oxygen   O2 Delivery Device Room air w/o2 available

## 2020-09-01 NOTE — THERAPY
Physical Therapy   Initial Evaluation     Patient Name: Norm Prabhakar  Age:  40 y.o., Sex:  male  Medical Record #: [id number]  Today's Date: 9/1/2020     Precautions: (P) Fall Risk    Assessment  Patient is 40 y.o. male who was recently admitted for BLE weakness and nausea. Pt has a hx of seizures per medical chart. Pt presented to PT with impaired balance, impaired gait, weakness, and dec activity tolerance. Pt was primarily limited by BLE weakness and poor motor control. During MMT pt demonstrated with 0-1/5 in BLE testing, however, during bed mobility and standing attempts pt demonstrated with inconsistent presentation as the patient was able to demonstrate slight standing along with BLE movements during bed mobility. Pt initially was not able to demonstrate any dorsiflexion during testing, however, when asked to pull ankle up for pillow repositioning pt was able to perform demonstrating 3/5 strength in dorsiflexion. Pt presents with very inconsistent functional mobility when compared to manual muscle testing. With current functional mobility and assistance pt would benefit from post acute therapy prior to d/c home given current objective finding, age, and IPLOF.     Plan    Recommend Physical Therapy 4 times per week until therapy goals are met for the following treatments:  Bed Mobility, Community Re-integration, Equipment, Gait Training, Manual Therapy, Neuro Re-Education / Balance, Self Care/Home Evaluation, Stair Training, Therapeutic Activities and Therapeutic Exercises    DC Equipment Recommendations: (P) Unable to determine at this time  Discharge Recommendations: (P) Recommend post-acute placement for additional physical therapy services prior to discharge home    Objective       09/01/20 1240   Prior Living Situation   Prior Services None   Housing / Facility Group Home  ([facility])   Steps Into Home 0   Steps In Home 0   Bathroom Set up Bathtub / Shower Combination   Equipment Owned None   Lives with  - Patient's Self Care Capacity Attendant / Paid Care Giver   Comments has assists with meals and medications; independent with mobility and ADL's    Prior Level of Functional Mobility   Bed Mobility Independent   Transfer Status Independent   Ambulation Independent   Distance Ambulation (Feet)   (community )   Assistive Devices Used None   Stairs Independent   Comments reports of an IPLOF    Gait Analysis   Gait Level Of Assist Unable to Participate   Bed Mobility    Supine to Sit Minimal Assist   Sit to Supine Minimal Assist   Scooting Supervised   Rolling Supervised   Comments HOB elevated and rails up    Functional Mobility   Sit to Stand Moderate Assist   Bed, Chair, Wheelchair Transfer Unable to Participate   Patient / Family Goals    Patient / Family Goal #1 to go back to PLOF   Short Term Goals    Short Term Goal # 1 pt will go supine<>sit w/hob flat and rails down w/spv in 6tx for safe d/c home    Short Term Goal # 2 pt will go sit<>stand w/fww w/spv in 6tx for safe d/c home    Short Term Goal # 3 pt will ambulate for 150ft w/fww w/spv in 6tx for safe d/c home

## 2020-09-01 NOTE — PROGRESS NOTES
0700: Bedside report from Dick DOLAN RN. Pt wakes to voice. VSS. Pt belongings within reach. Pt denies pain and nausea at this time. Will continue to monitor. Call light within reach. No needs at this time.     Pending PT/OT, neuro checks Q4.

## 2020-09-01 NOTE — THERAPY
Occupational Therapy   Initial Evaluation     Patient Name: Norm Prabhakar  Age:  40 y.o., Sex:  male  Medical Record #: [id number]  Today's Date: 9/1/2020     Precautions  Precautions: Fall Risk    Assessment    Patient is 40 y.o. male with a diagnosis of generalized weakness. He lives in a group home, and was I with all ADLs/IADLs, ambulates without AD, and rides the bus for errands and shopping. Pt currently presents with inconsistency with functional use of B LE, unable to stand, walk, or move B LE, would present himself with rigidity during MMT, but is able to perform functional mob with min A -- able to actively move B LE during supine to sit, and seated scooting towards HOB. Unsure whether or not pt is malingering, attention-seeking, or presenting with conversion disorder. Will continue to monitor and follow while in this setting.     Plan    Recommend Occupational Therapy 3 times per week until therapy goals are met for the following treatments:  Adaptive Equipment, Cognitive Skill Development, Community Re-integration, Manual Therapy Techniques, Self Care/Activities of Daily Living, Therapeutic Activities and Therapeutic Exercises.    DC Equipment Recommendations: Unable to determine at this time  Discharge Recommendations: Recommend post-acute placement for additional occupational therapy services prior to discharge home        09/01/20 1347   Prior Living Situation   Prior Services Intermittent Physical Support for ADL Per Service   Housing / Facility Group Home   Steps Into Home 1   Steps In Home 1  (flight)   Bathroom Set up Bathtub / Shower Combination   Equipment Owned None   Lives with - Patient's Self Care Capacity Attendant / Paid Care Giver   Comments pt lives in a group home, has help with IADLs, but was primarily I with ADLs   Prior Level of ADL Function   Self Feeding Independent   Grooming / Hygiene Independent   Bathing Independent   Dressing Independent   Toileting Independent   Prior Level  of IADL Function   Medication Management Dependent   Laundry Independent   Kitchen Mobility Independent   Finances Requires Assist   Home Management Requires Assist   Shopping Requires Assist   Prior Level Of Mobility Independent Without Device in Home;Independent Without Device in Community   Driving / Transportation Utilizes Public Transportation   Occupation (Pre-Hospital Vocational) Not Employed   Comments pt states he uses the bus for shopping at times, but has [facility] taking him to MD appts.    Cognition    Cognition / Consciousness X   Level of Consciousness Alert   Attention Impaired   Comments pt presents with poor following at times; tangential    Bed Mobility    Supine to Sit Minimal Assist   Sit to Supine Minimal Assist   Scooting Supervised   Rolling Supervised   ADL Assessment   Grooming Seated;Supervision   Lower Body Dressing Moderate Assist   Functional Mobility   Sit to Stand Refused   Bed, Chair, Wheelchair Transfer Unable to Participate   Mobility EOB and scoot to HOB only   Short Term Goals   Short Term Goal # 1 pt will complete ADL txfs with min A   Short Term Goal # 2 pt will complete G/H standing at sink with spv   Short Term Goal # 3 pt will complete LB dressing with spv   Anticipated Discharge Equipment and Recommendations   DC Equipment Recommendations Unable to determine at this time   Discharge Recommendations Recommend post-acute placement for additional occupational therapy services prior to discharge home

## 2020-09-01 NOTE — ASSESSMENT & PLAN NOTE
Patient has chronic asthma thus he will remain on RT protocol.  Currently he is not desaturating or hypoxic and does not need supplemental oxygen.
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
## 2020-09-01 NOTE — ASSESSMENT & PLAN NOTE
-accus with sliding scale coverage  -diabetic diet  -diabetic education  -follow glycohemoglobin levels long term patient's diabetes is poorly controlled with a hemoglobin A1c of 9.5  -continue with oral antihyperglycemics  -monitor for hypoglycemic episodes and adjust control if he should get low

## 2020-09-01 NOTE — ASSESSMENT & PLAN NOTE
Optimize blood pressure management to keep systolic blood pressure less than 140 diastolic under 90.

## 2020-09-01 NOTE — PROGRESS NOTES
Brief Neurology Note    neurology [facility] remote for [facility]    40M w hx of unclear demyelinating disease. For the past three days patients notes progressing weakness of all x4 extremities. This is a recurrent presentation for the patient. + tongue biting. Takes Depakote 500mg qAM and 1500mg qPM. Also on Topamax 25mg qd. Patient does not follow w neurology out pt.     Ddx seizure and untreated chronic demyelinating di sease NOS    RECS:    Confirm medication compliance. If the patient is not compliant on Depakote and Topamax, encourage medication compliance. Check serum Depakote level. If the patient is compliant on the seizure medication regimen, increase the Depakote to 1000 mg in the morning and 1500 mg at night, and increase Topamax to25 b.i.d.    Suspect there is a functional element to the patient’s presentation, especially in the s etting of non-enhancing lesions identified on MRI brain and cervical spine postgadolinium.    The patient’s care would be best guided by a demyelinating disease neurological specialist. Medicine team at [facility] to place referral for outpatient neurology clinic, demyelination specialty, with instructions to be seen by Dr. Brandon Delatorre. This appointment will be expedited.    LUIS Mccoy MD  Neurology
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
## 2020-09-01 NOTE — ASSESSMENT & PLAN NOTE
Chronic depression continue with Zoloft 100 mg daily and BuSpar 10 mg 3 times daily  Patient currently is not suicidal or homicidal.
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
## 2020-09-01 NOTE — CARE PLAN
Received report from day shift nurse.   Assumed pt care   Pt is A&Ox4, resting comfortably in bed.   Pt on r.a.. No signs of SOB/respiratory distress.   Labs noted, VSS.   Pt c/o no pain at this moment.   Assessment completed, generalized weakness noted. Difficulty moving BLE against gravity, slightly less difficult to move BUE against gravity. Padding added to bed rails for seizure precautions, pt states last seizure was a week ago but was unnoticed.   Fall precautions in place.   Bed at lowest position.   Call light and personal belongings within reach. Continue to monitor         Problem: Communication  Goal: The ability to communicate needs accurately and effectively will improve  Outcome: PROGRESSING AS EXPECTED     Problem: Safety  Goal: Will remain free from falls  Outcome: PROGRESSING AS EXPECTED

## 2020-09-02 ENCOUNTER — PATIENT OUTREACH (OUTPATIENT)
Dept: HEALTH INFORMATION MANAGEMENT | Facility: OTHER | Age: 40
End: 2020-09-02

## 2020-09-02 LAB
GLUCOSE BLD-MCNC: 137 MG/DL (ref 65–99)
GLUCOSE BLD-MCNC: 158 MG/DL (ref 65–99)
GLUCOSE BLD-MCNC: 169 MG/DL (ref 65–99)
GLUCOSE BLD-MCNC: 194 MG/DL (ref 65–99)

## 2020-09-02 PROCEDURE — 99225 PR SUBSEQUENT OBSERVATION CARE,LEVEL II: CPT | Performed by: INTERNAL MEDICINE

## 2020-09-02 PROCEDURE — 97530 THERAPEUTIC ACTIVITIES: CPT

## 2020-09-02 PROCEDURE — 700102 HCHG RX REV CODE 250 W/ 637 OVERRIDE(OP): Performed by: HOSPITALIST

## 2020-09-02 PROCEDURE — 97535 SELF CARE MNGMENT TRAINING: CPT

## 2020-09-02 PROCEDURE — 97116 GAIT TRAINING THERAPY: CPT

## 2020-09-02 PROCEDURE — A9270 NON-COVERED ITEM OR SERVICE: HCPCS | Performed by: INTERNAL MEDICINE

## 2020-09-02 PROCEDURE — 700102 HCHG RX REV CODE 250 W/ 637 OVERRIDE(OP): Performed by: INTERNAL MEDICINE

## 2020-09-02 PROCEDURE — 82962 GLUCOSE BLOOD TEST: CPT | Mod: 91

## 2020-09-02 PROCEDURE — A9270 NON-COVERED ITEM OR SERVICE: HCPCS | Performed by: HOSPITALIST

## 2020-09-02 PROCEDURE — 96372 THER/PROPH/DIAG INJ SC/IM: CPT

## 2020-09-02 PROCEDURE — G0378 HOSPITAL OBSERVATION PER HR: HCPCS

## 2020-09-02 RX ORDER — DIVALPROEX SODIUM 250 MG/1
1500 TABLET, DELAYED RELEASE ORAL
Status: DISCONTINUED | OUTPATIENT
Start: 2020-09-02 | End: 2020-09-03 | Stop reason: HOSPADM

## 2020-09-02 RX ORDER — TOPIRAMATE 25 MG/1
25 TABLET ORAL 2 TIMES DAILY
Status: DISCONTINUED | OUTPATIENT
Start: 2020-09-02 | End: 2020-09-03 | Stop reason: HOSPADM

## 2020-09-02 RX ORDER — DIVALPROEX SODIUM 250 MG/1
1000 TABLET, DELAYED RELEASE ORAL DAILY
Status: DISCONTINUED | OUTPATIENT
Start: 2020-09-03 | End: 2020-09-03 | Stop reason: HOSPADM

## 2020-09-02 RX ADMIN — TOPIRAMATE 25 MG: 25 TABLET, FILM COATED ORAL at 05:25

## 2020-09-02 RX ADMIN — BUDESONIDE AND FORMOTEROL FUMARATE DIHYDRATE 2 PUFF: 160; 4.5 AEROSOL RESPIRATORY (INHALATION) at 05:27

## 2020-09-02 RX ADMIN — MONTELUKAST 10 MG: 10 TABLET, FILM COATED ORAL at 17:37

## 2020-09-02 RX ADMIN — GLIMEPIRIDE 4 MG: 2 TABLET ORAL at 05:26

## 2020-09-02 RX ADMIN — METFORMIN HYDROCHLORIDE 1000 MG: 500 TABLET ORAL at 17:37

## 2020-09-02 RX ADMIN — FENOFIBRATE 134 MG: 134 CAPSULE ORAL at 17:37

## 2020-09-02 RX ADMIN — DIVALPROEX SODIUM 1500 MG: 250 TABLET, DELAYED RELEASE ORAL at 17:49

## 2020-09-02 RX ADMIN — PRAZOSIN HYDROCHLORIDE 4 MG: 1 CAPSULE ORAL at 17:37

## 2020-09-02 RX ADMIN — BUSPIRONE HYDROCHLORIDE 10 MG: 5 TABLET ORAL at 05:26

## 2020-09-02 RX ADMIN — ATORVASTATIN CALCIUM 80 MG: 40 TABLET, FILM COATED ORAL at 17:37

## 2020-09-02 RX ADMIN — SERTRALINE HYDROCHLORIDE 100 MG: 50 TABLET, FILM COATED ORAL at 05:25

## 2020-09-02 RX ADMIN — LEVOTHYROXINE SODIUM 25 MCG: 0.05 TABLET ORAL at 05:27

## 2020-09-02 RX ADMIN — ACETAMINOPHEN 650 MG: 325 TABLET, FILM COATED ORAL at 17:48

## 2020-09-02 RX ADMIN — DIVALPROEX SODIUM 500 MG: 250 TABLET, DELAYED RELEASE ORAL at 05:26

## 2020-09-02 RX ADMIN — INSULIN GLARGINE 15 UNITS: 100 INJECTION, SOLUTION SUBCUTANEOUS at 05:16

## 2020-09-02 RX ADMIN — FAMOTIDINE 10 MG: 20 TABLET ORAL at 05:26

## 2020-09-02 RX ADMIN — ACETAMINOPHEN 650 MG: 325 TABLET, FILM COATED ORAL at 05:27

## 2020-09-02 RX ADMIN — BUSPIRONE HYDROCHLORIDE 10 MG: 5 TABLET ORAL at 17:37

## 2020-09-02 RX ADMIN — TOPIRAMATE 25 MG: 25 TABLET, FILM COATED ORAL at 17:37

## 2020-09-02 RX ADMIN — METFORMIN HYDROCHLORIDE 1000 MG: 500 TABLET ORAL at 05:26

## 2020-09-02 RX ADMIN — FAMOTIDINE 10 MG: 20 TABLET ORAL at 17:37

## 2020-09-02 RX ADMIN — BUDESONIDE AND FORMOTEROL FUMARATE DIHYDRATE 2 PUFF: 160; 4.5 AEROSOL RESPIRATORY (INHALATION) at 17:41

## 2020-09-02 RX ADMIN — BUSPIRONE HYDROCHLORIDE 10 MG: 5 TABLET ORAL at 11:25

## 2020-09-02 RX ADMIN — LEVOTHYROXINE SODIUM 200 MCG: 0.05 TABLET ORAL at 05:25

## 2020-09-02 RX ADMIN — INSULIN GLARGINE 20 UNITS: 100 INJECTION, SOLUTION SUBCUTANEOUS at 17:51

## 2020-09-02 SDOH — ECONOMIC STABILITY: INCOME INSECURITY: HOW HARD IS IT FOR YOU TO PAY FOR THE VERY BASICS LIKE FOOD, HOUSING, MEDICAL CARE, AND HEATING?: SOMEWHAT HARD

## 2020-09-02 SDOH — ECONOMIC STABILITY: FOOD INSECURITY: WITHIN THE PAST 12 MONTHS, YOU WORRIED THAT YOUR FOOD WOULD RUN OUT BEFORE YOU GOT MONEY TO BUY MORE.: SOMETIMES TRUE

## 2020-09-02 SDOH — ECONOMIC STABILITY: TRANSPORTATION INSECURITY
IN THE PAST 12 MONTHS, HAS LACK OF TRANSPORTATION KEPT YOU FROM MEETINGS, WORK, OR FROM GETTING THINGS NEEDED FOR DAILY LIVING?: NO

## 2020-09-02 SDOH — ECONOMIC STABILITY: TRANSPORTATION INSECURITY
IN THE PAST 12 MONTHS, HAS THE LACK OF TRANSPORTATION KEPT YOU FROM MEDICAL APPOINTMENTS OR FROM GETTING MEDICATIONS?: NO

## 2020-09-02 SDOH — ECONOMIC STABILITY: FOOD INSECURITY: WITHIN THE PAST 12 MONTHS, THE FOOD YOU BOUGHT JUST DIDN'T LAST AND YOU DIDN'T HAVE MONEY TO GET MORE.: SOMETIMES TRUE

## 2020-09-02 ASSESSMENT — ENCOUNTER SYMPTOMS
DIZZINESS: 0
DIARRHEA: 0
ABDOMINAL PAIN: 0
FOCAL WEAKNESS: 0
VOMITING: 0
SEIZURES: 0
COUGH: 0
PALPITATIONS: 0
BACK PAIN: 0
DIAPHORESIS: 0
BLURRED VISION: 0
CHILLS: 0
SORE THROAT: 0
HEADACHES: 0
MYALGIAS: 0
FEVER: 0
WEAKNESS: 1
NECK PAIN: 0
SHORTNESS OF BREATH: 0
FLANK PAIN: 0
BRUISES/BLEEDS EASILY: 0
WHEEZING: 0
NAUSEA: 0
BLOOD IN STOOL: 0
SPUTUM PRODUCTION: 0

## 2020-09-02 ASSESSMENT — COGNITIVE AND FUNCTIONAL STATUS - GENERAL
MOVING FROM LYING ON BACK TO SITTING ON SIDE OF FLAT BED: A LITTLE
EATING MEALS: A LITTLE
PERSONAL GROOMING: A LITTLE
HELP NEEDED FOR BATHING: A LITTLE
DRESSING REGULAR UPPER BODY CLOTHING: A LITTLE
CLIMB 3 TO 5 STEPS WITH RAILING: A LITTLE
DRESSING REGULAR LOWER BODY CLOTHING: A LITTLE
TOILETING: A LITTLE
DAILY ACTIVITIY SCORE: 18
SUGGESTED CMS G CODE MODIFIER MOBILITY: CJ
MOBILITY SCORE: 22
SUGGESTED CMS G CODE MODIFIER DAILY ACTIVITY: CK

## 2020-09-02 ASSESSMENT — GAIT ASSESSMENTS
DISTANCE (FEET): 120
ASSISTIVE DEVICE: FRONT WHEEL WALKER
GAIT LEVEL OF ASSIST: SUPERVISED
DEVIATION: STEP TO;DECREASED HEEL STRIKE

## 2020-09-02 NOTE — THERAPY
Physical Therapy   Daily Treatment     Patient Name: Norm Prabhakar  Age:  40 y.o., Sex:  male  Medical Record #: [id number]  Today's Date: 9/2/2020     Precautions: (P) Fall Risk    Assessment    Pt is progressing well with functional mobility and was able to demonstrate a significant increased in strength today. Pt was able to demonstrate with improved upright balance, ambulation distance, and activity tolerance. Pt was able to ambulate for about 120 ft with step to gait mechanics and with bradykinetic characteristics. Pt is currently demonstrating safe functional mobility to be able to return back to group home with recs for use of FWW and HH therapy services to progress functional strength.     Plan    Continue current treatment plan.    DC Equipment Recommendations: (P) Front-Wheel Walker  Discharge Recommendations: (P) Recommend home health for continued physical therapy services     Objective       09/02/20 1530   Gait Analysis   Gait Level Of Assist Supervised   Assistive Device Front Wheel Walker   Distance (Feet) 120   # of Times Distance was Traveled 1   Deviation Step To;Decreased Heel Strike   Weight Bearing Status fwb   Skilled Intervention Verbal Cuing;Facilitation   Bed Mobility    Supine to Sit Supervised   Sit to Supine Supervised   Scooting Supervised   Rolling Supervised   Skilled Intervention Verbal Cuing   Functional Mobility   Sit to Stand Minimal Assist   Bed, Chair, Wheelchair Transfer Supervised   Skilled Intervention Verbal Cuing;Facilitation;Tactile Cuing   Patient / Family Goals    Patient / Family Goal #1 to go back to PLOF   Short Term Goals    Short Term Goal # 1 pt will go supine<>sit w/hob flat and rails down w/spv in 6tx for safe d/c home    Goal Outcome # 1 Goal met   Short Term Goal # 2 pt will go sit<>stand w/fww w/spv in 6tx for safe d/c home    Goal Outcome # 2 Progressing as expected   Short Term Goal # 3 pt will ambulate for 150ft w/fww w/spv in 6tx for safe d/c home    Goal  Outcome # 3 Progressing as expected

## 2020-09-02 NOTE — CARE PLAN
Received report from day shift nurse.   Assumed pt care   Pt is A&Ox4, resting comfortably in bed.   Pt on r.a.. No signs of SOB/respiratory distress.   Labs noted, VSS.   Pt c/o no pain at this moment.   Assessment completed, complains of generalized weakness to BLE and BUE. Educated on diabetic diet and 2500kcal limit.   Fall precautions in place.   Bed at lowest position.   Call light and personal belongings within reach. Continue to monitor         Problem: Knowledge Deficit  Goal: Knowledge of disease process/condition, treatment plan, diagnostic tests, and medications will improve  Outcome: PROGRESSING AS EXPECTED     Problem: Discharge Barriers/Planning  Goal: Patient's continuum of care needs will be met  Outcome: PROGRESSING AS EXPECTED

## 2020-09-02 NOTE — PROGRESS NOTES
0700: Bedside report from Dick DOLAN RN. Pt wakes to voice. VSS. Pt belongings within reach. Pt denies pain and nausea at this time. Will continue to monitor. Call light within reach. No needs at this time.     Pending PT/OT and medication changes if needed per hospitalist. .

## 2020-09-02 NOTE — THERAPY
Occupational Therapy  Daily Treatment     Patient Name: Norm Prabhakar  Age:  40 y.o., Sex:  male  Medical Record #: [id number]  Today's Date: 9/2/2020     Precautions  Precautions: Fall Risk    Assessment    Pt seen for OT tx today. ADL assessment in both sitting and standing levels. pt reports being wobbly initially during STS, initially only able to tolerate 3-5 secs of static standing, with height of bed raised to increase ease and independence. pt then able to ambulate to bathroom sink to perform standing grooming ADLs. pt initially performed one-handed ADL techniques, but was able to tolerate static standing unsupported during bilateral handwashing. no LOB noted during entire OT session. Will require FWW to aid with functional txfs and mob, but pt states he already has one at home. Will continue to follow while in house.    Plan    Continue current treatment plan.    DC Equipment Recommendations: None (pt states he has a bedside commode and FWW at home)  Discharge Recommendations: Recommend home health for continued occupational therapy services       09/02/20 1435   Cognition    Cognition / Consciousness X   Level of Consciousness Alert   Attention Impaired   Comments easily distracted, needs cues for task initiation/completion   Bed Mobility    Supine to Sit Supervised   Sit to Supine   (up EOB post session)   Scooting Supervised   Rolling Supervised   Skilled Intervention Verbal Cuing   Activities of Daily Living   Grooming Standing;Supervision   Upper Body Dressing Supervision   Lower Body Dressing Supervision  (pulling up socks and shorts)   Skilled Intervention Verbal Cuing;Sequencing;Postural Facilitation   Functional Mobility   Sit to Stand Supervised   Bed, Chair, Wheelchair Transfer Minimal Assist   Transfer Method Stand Step  (with FWW)   Mobility in room with FWW   Skilled Intervention Verbal Cuing;Facilitation;Tactile Cuing   Short Term Goals   Short Term Goal # 1 pt will complete ADL txfs with min  A   Goal Outcome # 1 Progressing as expected   Short Term Goal # 2 pt will complete G/H standing at sink with spv   Goal Outcome # 2 Progressing as expected   Short Term Goal # 3 pt will complete LB dressing with spv   Goal Outcome # 3 Progressing as expected   Anticipated Discharge Equipment and Recommendations   DC Equipment Recommendations None  (pt states he has a bedside commode and FWW at home)   Discharge Recommendations Recommend home health for continued occupational therapy services

## 2020-09-02 NOTE — PROGRESS NOTES
Pt able to stand at edge of bed to change in to personal clothing. States his legs feel like jello after a while but able to stand with walker.

## 2020-09-02 NOTE — PROGRESS NOTES
9/1/2020 MIGUEL Cervantes met with pt bedside to introduce CCM services. Completed SDOH screening and inpatient assessment. Pt states he is in contact with his PCP at [facility] and will schedule his follow up appointment. Pt would like to participate in the Med's to Beds service. Pt states his main source of transportation is [employer] and Bus. Pt lives in a group home however would like additional food resources delivered to his residence. Pt denies the use of medical equipment at home. CCM contact info given to pt prior to end of encounter.     Community Health Worker Intake  • Social determinates of health intake completed  • Identified barriers to food resources   • Contact information provided to Norm Prabhakar. Yes   • Has PCP appointment scheduled for: Pt states he will schedule his own follow up appointment   • Scheduled Food Delivery/Home Visit/Outpatient Visit: Food Delivery   • Referral to RN or SW: N/A  • Accepted/Declined Meds-To-Beds. Yes   • Inpatient/Outpatient assessment completed. Inpatient   • Did the patient receive medications post discharge: Yes    Plan:  Enroll pt in Food Delivery and Food Rx for next Wednesday 9/9/2020

## 2020-09-03 VITALS
DIASTOLIC BLOOD PRESSURE: 65 MMHG | SYSTOLIC BLOOD PRESSURE: 115 MMHG | TEMPERATURE: 98 F | OXYGEN SATURATION: 98 % | BODY MASS INDEX: 34.25 KG/M2 | RESPIRATION RATE: 18 BRPM | WEIGHT: 296 LBS | HEIGHT: 78 IN | HEART RATE: 88 BPM

## 2020-09-03 LAB
GLUCOSE BLD-MCNC: 176 MG/DL (ref 65–99)
GLUCOSE BLD-MCNC: 208 MG/DL (ref 65–99)
VALPROATE SERPL-MCNC: 52.8 UG/ML (ref 50–100)

## 2020-09-03 PROCEDURE — 99217 PR OBSERVATION CARE DISCHARGE: CPT | Performed by: INTERNAL MEDICINE

## 2020-09-03 PROCEDURE — 700102 HCHG RX REV CODE 250 W/ 637 OVERRIDE(OP): Performed by: INTERNAL MEDICINE

## 2020-09-03 PROCEDURE — 36415 COLL VENOUS BLD VENIPUNCTURE: CPT

## 2020-09-03 PROCEDURE — A9270 NON-COVERED ITEM OR SERVICE: HCPCS | Performed by: HOSPITALIST

## 2020-09-03 PROCEDURE — A9270 NON-COVERED ITEM OR SERVICE: HCPCS | Performed by: INTERNAL MEDICINE

## 2020-09-03 PROCEDURE — 96372 THER/PROPH/DIAG INJ SC/IM: CPT

## 2020-09-03 PROCEDURE — 700102 HCHG RX REV CODE 250 W/ 637 OVERRIDE(OP): Performed by: HOSPITALIST

## 2020-09-03 PROCEDURE — G0378 HOSPITAL OBSERVATION PER HR: HCPCS

## 2020-09-03 PROCEDURE — 94760 N-INVAS EAR/PLS OXIMETRY 1: CPT

## 2020-09-03 PROCEDURE — 80164 ASSAY DIPROPYLACETIC ACD TOT: CPT

## 2020-09-03 PROCEDURE — 82962 GLUCOSE BLOOD TEST: CPT

## 2020-09-03 RX ORDER — TOPIRAMATE 25 MG/1
25 TABLET ORAL 2 TIMES DAILY
Qty: 60 TAB | Refills: 0 | Status: ON HOLD
Start: 2020-09-03 | End: 2021-03-15

## 2020-09-03 RX ORDER — DIVALPROEX SODIUM 500 MG/1
1000 TABLET, DELAYED RELEASE ORAL EVERY MORNING
Qty: 30 TAB | Refills: 0 | Status: ON HOLD
Start: 2020-09-03 | End: 2021-03-15

## 2020-09-03 RX ORDER — DIVALPROEX SODIUM 500 MG/1
1500 TABLET, DELAYED RELEASE ORAL EVERY EVENING
Qty: 60 TAB | Refills: 0 | Status: ON HOLD
Start: 2020-09-03 | End: 2021-03-15

## 2020-09-03 RX ADMIN — INSULIN GLARGINE 15 UNITS: 100 INJECTION, SOLUTION SUBCUTANEOUS at 05:52

## 2020-09-03 RX ADMIN — METFORMIN HYDROCHLORIDE 1000 MG: 500 TABLET ORAL at 05:48

## 2020-09-03 RX ADMIN — LISINOPRIL 10 MG: 5 TABLET ORAL at 05:48

## 2020-09-03 RX ADMIN — GLIMEPIRIDE 4 MG: 2 TABLET ORAL at 05:49

## 2020-09-03 RX ADMIN — TOPIRAMATE 25 MG: 25 TABLET, FILM COATED ORAL at 05:48

## 2020-09-03 RX ADMIN — FAMOTIDINE 10 MG: 20 TABLET ORAL at 05:49

## 2020-09-03 RX ADMIN — SERTRALINE HYDROCHLORIDE 100 MG: 50 TABLET, FILM COATED ORAL at 05:47

## 2020-09-03 RX ADMIN — ALBUTEROL SULFATE 2 PUFF: 90 AEROSOL, METERED RESPIRATORY (INHALATION) at 05:50

## 2020-09-03 RX ADMIN — LEVOTHYROXINE SODIUM 25 MCG: 0.05 TABLET ORAL at 05:49

## 2020-09-03 RX ADMIN — BUSPIRONE HYDROCHLORIDE 10 MG: 5 TABLET ORAL at 11:23

## 2020-09-03 RX ADMIN — BUDESONIDE AND FORMOTEROL FUMARATE DIHYDRATE 2 PUFF: 160; 4.5 AEROSOL RESPIRATORY (INHALATION) at 05:50

## 2020-09-03 RX ADMIN — LEVOTHYROXINE SODIUM 200 MCG: 0.05 TABLET ORAL at 05:48

## 2020-09-03 RX ADMIN — BUSPIRONE HYDROCHLORIDE 10 MG: 5 TABLET ORAL at 05:48

## 2020-09-03 RX ADMIN — DIVALPROEX SODIUM 1000 MG: 250 TABLET, DELAYED RELEASE ORAL at 08:59

## 2020-09-03 NOTE — PROGRESS NOTES
"Patient A&Ox4, up w/ SBA FWW to BS, -n/-v/-d, +flatus, normoactive bs, last BM 9/2. Patient reporting no pain at this time. Patient and RN discussed plan of care, all questions answered. RA, S/L, patient tolerating diabetic diet, PM FSBS 130's. Call light w/in reach, personal belongings available. Educated on importance of calling for assistance, hourly rounding in place.     /67   Pulse 67   Temp 36.3 °C (97.4 °F) (Oral)   Resp 18   Ht 1.981 m (6' 6\")   Wt (!) 134.3 kg (296 lb)   SpO2 93%   BMI 34.21 kg/m²     "

## 2020-09-03 NOTE — CARE PLAN
Problem: Safety  Goal: Will remain free from injury  Outcome: PROGRESSING AS EXPECTED  Goal: Will remain free from falls  Outcome: PROGRESSING AS EXPECTED     Problem: Venous Thromboembolism (VTW)/Deep Vein Thrombosis (DVT) Prevention:  Goal: Patient will participate in Venous Thrombosis (VTE)/Deep Vein Thrombosis (DVT)Prevention Measures  Outcome: PROGRESSING AS EXPECTED     Problem: Knowledge Deficit  Goal: Knowledge of disease process/condition, treatment plan, diagnostic tests, and medications will improve  Outcome: PROGRESSING AS EXPECTED     Problem: Respiratory:  Goal: Respiratory status will improve  Outcome: PROGRESSING AS EXPECTED     Problem: Mobility  Goal: Risk for activity intolerance will decrease  Outcome: PROGRESSING AS EXPECTED

## 2020-09-03 NOTE — FACE TO FACE
Face to Face Supporting Documentation - Home Health    The encounter with this patient was in whole or in part the primary reason for home health admission.    Date of encounter:   Patient:                    MRN:                       YOB: 2020  Norm Prabhakar  [id number]  1980     Home health to see patient for:  Skilled Nursing care for assessment, interventions & education, Physical Therapy evaluation and treatment and Occupational therapy evaluation and treatment    Skilled need for:  Comment: PT OT    Skilled nursing interventions to include:  Comment: PT OT    Homebound status evidenced by:  Needs the assistance of another person in order to leave the home. Leaving home requires a considerable and taxing effort. There is a normal inability to leave the home.    Community Physician to provide follow up care: ANIKET Davis     Optional Interventions? No      I certify the face to face encounter for this home health care referral meets the CMS requirements and the encounter/clinical assessment with the patient was, in whole, or in part, for the medical condition(s) listed above, which is the primary reason for home health care. Based on my clinical findings: the service(s) are medically necessary, support the need for home health care, and the homebound criteria are met.  I certify that this patient has had a face to face encounter by myself.  Oleg Freire M.D. - NPI: [id number]

## 2020-09-03 NOTE — CARE PLAN
Problem: Safety  Goal: Will remain free from falls  Outcome: PROGRESSING AS EXPECTED  Proper fall precautions in place. Call light within reach and encouraged to use. Hourly rounding in practice. Bed alarm in use.       Problem: Pain Management  Goal: Pain level will decrease to patient's comfort goal  Outcome: PROGRESSING AS EXPECTED  No pain at this time     Problem: Skin Integrity  Goal: Risk for impaired skin integrity will decrease  Outcome: PROGRESSING AS EXPECTED  Pt demonstrates ability to turn self in bed without assistance of staff. Understands importance in prevention of breakdown, ulcers, and potential infection. Hourly rounding in effect.
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
## 2020-09-03 NOTE — DISCHARGE PLANNING
Spoke to Sadia at [facility]     Transport is scheduled for 1545 today going home with [facility] [employer].

## 2020-09-03 NOTE — DISCHARGE PLANNING
Received Choice form at 0940  Agency/Facility Name: [facility]   Referral sent per Choice form at 0945.         @1048  Agency/Facility Name: [facility]   Spoke To: Catrina   Outcome: Per Catrina [facility] is having some issues with the fax today, asks [facility] to resend, [facility] sent at 1045.       @1050  Catrina with [facility] states Medicaid census is currently full, pt is declined.       [facility] sent referral to [facility] per direction of [facility].       @9506  Ag[facility] Name: [facility]   Outcome: [facility] left voicemail with intake for f/u.        @7926  [facility] Name[facility]    Spoke To: [facility]   Outcome: Per [facility] is unable to accept medicaid pt at this time.
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
## 2020-09-03 NOTE — PROGRESS NOTES
Report received from night shift RN. Assume care. Pt. AAOx4 pt is bed,  Assessment completed. VSS. Denies pain, N/V. able to wiggle toes and dorsi/plantar flex feet, good CMS and pulses to grady LE, numbness to grady LE reported, Pt was update for the care for the day. White board updated, All question answered. Pt has call light within reach,  bed is in the lowest position. Pt has no other needs at this time.   0930 Pt agreed to go home with STEVE

## 2020-09-03 NOTE — DISCHARGE INSTRUCTIONS
Discharge Instructions    Discharged to home by car with relative. Discharged via wheelchair, hospital escort: Yes.  Special equipment needed: Not Applicable    Be sure to schedule a follow-up appointment with your primary care doctor or any specialists as instructed.     Discharge Plan:   Diet Plan: Discussed  Activity Level: Discussed  Confirmed Follow up Appointment: Patient to Call and Schedule Appointment  Confirmed Symptoms Management: Discussed  Medication Reconciliation Updated: Yes    I understand that a diet low in cholesterol, fat, and sodium is recommended for good health. Unless I have been given specific instructions below for another diet, I accept this instruction as my diet prescription.   Other diet: Diabetic    Special Instructions: None    · Is patient discharged on Warfarin / Coumadin?   No     Depression / Suicide Risk    As you are discharged from this [facility], it is important to learn how to keep safe from harming yourself.    Recognize the warning signs:  · Abrupt changes in personality, positive or negative- including increase in energy   · Giving away possessions  · Change in eating patterns- significant weight changes-  positive or negative  · Change in sleeping patterns- unable to sleep or sleeping all the time   · Unwillingness or inability to communicate  · Depression  · Unusual sadness, discouragement and loneliness  · Talk of wanting to die  · Neglect of personal appearance   · Rebelliousness- reckless behavior  · Withdrawal from people/activities they love  · Confusion- inability to concentrate     If you or a loved one observes any of these behaviors or has concerns about self-harm, here's what you can do:  · Talk about it- your feelings and reasons for harming yourself  · Remove any means that you might use to hurt yourself (examples: pills, rope, extension cords, firearm)  · Get professional help from the community (Mental Health, Substance Abuse, psychological  counseling)  · Do not be alone:Call your Safe Contact- someone whom you trust who will be there for you.  · Call your local CRISIS HOTLINE 656-5079 or 471-623-2511  · Call your local Children's Mobile Crisis Response Team [place] (343) 510-2545 or www.JAZIO  · Call the toll free National Suicide Prevention Hotlines   · National Suicide Prevention Lifeline 262-382-VFHO (3796)  · National Hope Line Network 800-SUICIDE (250-4483)

## 2020-09-04 ENCOUNTER — PATIENT OUTREACH (OUTPATIENT)
Dept: HEALTH INFORMATION MANAGEMENT | Facility: OTHER | Age: 40
End: 2020-09-04

## 2020-09-04 NOTE — PROGRESS NOTES
9/4/2020- MIGUEL Cervantes contacted pt for paperwork regarding Food Rx program. Pt informed food delivery will be Wednesday Sep 9, 2020.   Pt will be d/c from [facility] services. All goals met.

## 2020-09-27 ENCOUNTER — HOSPITAL ENCOUNTER (EMERGENCY)
Facility: MEDICAL CENTER | Age: 40
End: 2020-09-27
Attending: EMERGENCY MEDICINE
Payer: MEDICAID

## 2020-09-27 VITALS
HEART RATE: 88 BPM | SYSTOLIC BLOOD PRESSURE: 92 MMHG | OXYGEN SATURATION: 93 % | DIASTOLIC BLOOD PRESSURE: 57 MMHG | BODY MASS INDEX: 30.43 KG/M2 | RESPIRATION RATE: 16 BRPM | WEIGHT: 263 LBS | TEMPERATURE: 97.9 F | HEIGHT: 78 IN

## 2020-09-27 DIAGNOSIS — F43.0 ACUTE STRESS REACTION: ICD-10-CM

## 2020-09-27 DIAGNOSIS — F32.A DEPRESSION, UNSPECIFIED DEPRESSION TYPE: ICD-10-CM

## 2020-09-27 LAB
AMPHET UR QL SCN: NEGATIVE
BARBITURATES UR QL SCN: NEGATIVE
BENZODIAZ UR QL SCN: NEGATIVE
BZE UR QL SCN: NEGATIVE
CANNABINOIDS UR QL SCN: NEGATIVE
METHADONE UR QL SCN: NEGATIVE
OPIATES UR QL SCN: NEGATIVE
OXYCODONE UR QL SCN: NEGATIVE
PCP UR QL SCN: NEGATIVE
POC BREATHALIZER: 0 PERCENT (ref 0–0.01)
PROPOXYPH UR QL SCN: NEGATIVE

## 2020-09-27 PROCEDURE — 80307 DRUG TEST PRSMV CHEM ANLYZR: CPT

## 2020-09-27 PROCEDURE — A9270 NON-COVERED ITEM OR SERVICE: HCPCS | Performed by: EMERGENCY MEDICINE

## 2020-09-27 PROCEDURE — 302970 POC BREATHALIZER

## 2020-09-27 PROCEDURE — 90791 PSYCH DIAGNOSTIC EVALUATION: CPT

## 2020-09-27 PROCEDURE — 700102 HCHG RX REV CODE 250 W/ 637 OVERRIDE(OP): Performed by: EMERGENCY MEDICINE

## 2020-09-27 PROCEDURE — 99285 EMERGENCY DEPT VISIT HI MDM: CPT

## 2020-09-27 RX ORDER — ALPRAZOLAM 0.25 MG/1
0.25 TABLET ORAL ONCE
Status: COMPLETED | OUTPATIENT
Start: 2020-09-27 | End: 2020-09-27

## 2020-09-27 RX ADMIN — ALPRAZOLAM 0.25 MG: 0.25 TABLET ORAL at 22:24

## 2020-09-27 ASSESSMENT — LIFESTYLE VARIABLES
HAVE YOU EVER FELT YOU SHOULD CUT DOWN ON YOUR DRINKING: NO
DOES PATIENT WANT TO STOP DRINKING: NO
EVER FELT BAD OR GUILTY ABOUT YOUR DRINKING: NO
DO YOU DRINK ALCOHOL: NO
TOTAL SCORE: 0
HAVE PEOPLE ANNOYED YOU BY CRITICIZING YOUR DRINKING: NO
TOTAL SCORE: 0
EVER HAD A DRINK FIRST THING IN THE MORNING TO STEADY YOUR NERVES TO GET RID OF A HANGOVER: NO
CONSUMPTION TOTAL: INCOMPLETE
TOTAL SCORE: 0

## 2020-09-27 ASSESSMENT — FIBROSIS 4 INDEX: FIB4 SCORE: 0.61
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
## 2020-09-28 NOTE — ED PROVIDER NOTES
"ED Provider Note    CHIEF COMPLAINT  Chief Complaint   Patient presents with   • Suicidal Ideation     pt's father  today from a drug overdose, pt states he is not in the \"right state of mind.\"       HPI  Norm Prabhakar is a 40 y.o. male who presents to the emergency department chief complaint of feeling quite upset.  The patient states his father's birthday was today and he  in front of him a few months ago from a drug overdose.  He states it just made him feel very alone and upset and he lives in a group home so they brought him here because he told them he felt like hurting himself.  He denies currently this time that he actually wants to commit suicide or injure himself in any way.  He does states he feels like he just wants to gather all of the stuff and run away and worried no one can find him again.  He denies homicidal or suicidal ideation or active hallucinations he is been compliant with his medication    REVIEW OF SYSTEMS  Positives as above. Pertinent negatives include chest pain shortness of breath head injury cough congestion fevers chills suicidal or homicidal ideation delusions or hallucinations  All other review of systems are negative    PAST MEDICAL HISTORY   has a past medical history of Anxiety, ASTHMA, Bipolar 1 disorder ([facility]), Depression, Diabetes ([facility]), Fall, Glaucoma, Glaucoma (), Hypothyroidism, Indigestion, Mental disorder, Murmur, Pneumonia, Psychiatric problem (), S/P thyroidectomy, Seizure ([facility]) (), Seizure disorder ([facility]), and Unspecified disorder of thyroid.    SOCIAL HISTORY  Social History     Tobacco Use   • Smoking status: Former Smoker     Packs/day: 0.25     Types: Cigarettes, Cigars     Quit date: 2020     Years since quittin.3   • Smokeless tobacco: Never Used   Substance and Sexual Activity   • Alcohol use: Yes     Frequency: 2-3 times a week   • Drug use: Yes     Types: Inhaled     Comment: marijuana   • Sexual activity: Not on file " "      SURGICAL HISTORY   has a past surgical history that includes eye surgery; thyroid lobectomy; and other.    CURRENT MEDICATIONS  Home Medications    **Home medications have not yet been reviewed for this encounter**         ALLERGIES  Allergies   Allergen Reactions   • Abilify Unspecified     \"Feeling tired, like I don't even know whats going on around me\"   • Fish      Pt reports fish causes him to be sick to his stomach         PHYSICAL EXAM  VITAL SIGNS: BP (!) 92/57   Pulse 88   Temp 36.6 °C (97.9 °F)   Resp 16   Ht 1.981 m (6' 6\")   Wt 119.3 kg (263 lb)   SpO2 93%   BMI 30.39 kg/m²    Pulse ox interpretation: I interpret this pulse ox as normal.  Constitutional: Alert in no apparent distress.  HENT: Normocephalic atraumatic, MMM  Eyes: PER, Conjunctiva normal, Non-icteric.   Neck: Normal range of motion, No tenderness, Supple, No stridor.   Cardiovascular: Regular rate and rhythm, no murmurs.   Thorax & Lungs: Normal breath sounds, No respiratory distress, No wheezing, No chest tenderness.   Abdomen: Bowel sounds normal, Soft, No tenderness, No pulsatile masses. No peritoneal signs.  Skin: Warm, Dry, No erythema, No rash.   Back: No bony tenderness, No CVA tenderness.   Extremities/MSK: Intact equal distal pulses, No edema, No tenderness, No cyanosis, no major deformities noted  Neurologic: Alert and oriented x3, No focal deficits noted.   Psychiatric: Mildly depressed affect but otherwise calm and cooperative not endorsing suicidal or homicidal lesions not responding to internal stimuli easily redirected poor judgment      DIFFERENTIAL DIAGNOSIS AND WORK UP PLAN    This is a 40 y.o. male who presents with no active suicidal ideation but he lives in a group home and they cannot take him back in less pain sure that he safe he just feels a little extra stressed and anxious because of today's events as it is his dad's birthday and he did lose him a few months ago.  I will have behavioral health " "evaluate him but at this time is not really meeting criteria for legal hold    DIAGNOSTIC STUDIES / PROCEDURES    LABS  Pertinent Lab Findings    Labs Reviewed   POC BREATHALIZER - Normal   URINE DRUG SCREEN       RADIOLOGY  No orders to display     The radiologist's interpretation of all radiological studies have been reviewed by me.      COURSE & MEDICAL DECISION MAKING  Pertinent Labs & Imaging studies reviewed. (See chart for details)    10:04 PM  Spoke with Shalini with behavioral health she agrees that he does not meet criteria for legal hold and she spoke with the patient's group home the plan is for him to be taken to [facility] in the morning to restart therapy and potentially change his medications the patient was given a single dose of oral Xanax which she takes at his group home when he is more anxious but he is otherwise now and is calm and cooperative and feels comfortable going back to his group home    BP (!) 92/57   Pulse 88   Temp 36.6 °C (97.9 °F)   Resp 16   Ht 1.981 m (6' 6\")   Wt 119.3 kg (263 lb)   SpO2 93%   BMI 30.39 kg/m²       I verified that the patient was wearing a mask and I was wearing appropriate PPE every time I entered the room. The patient's mask was on the patient at all times during my encounter except for a brief view of the oropharynx.    The patient will return for new or worsening symptoms and is stable at the time of discharge.    The patient is referred to a primary physician for blood pressure management, diabetic screening, and for all other preventative health concerns.    DISPOSITION:  Patient will be discharged home in stable condition.    FOLLOW UP:  ANIKET Davis  [address] [facility] [id number] [phone]    Schedule an appointment as soon as possible for a visit       [facility]  [address] [id number] [phone]  Go on 9/28/2020      [facility], " Emergency Dept  [address] [facility] [address]  [phone]    If symptoms worsen      OUTPATIENT MEDICATIONS:  Discharge Medication List as of 9/27/2020 10:21 PM              FINAL IMPRESSION  1. Depression, unspecified depression type     2. Acute stress reaction             Electronically signed by: Daniela Stephen M.D., 9/27/2020 8:33 PM    This dictation has been created using voice recognition software and/or scribes. The accuracy of the dictation is limited by the abilities of the software and the expertise of the scribes. I expect there may be some errors of grammar and possibly content. I made every attempt to manually correct the errors within my dictation. However, errors related to voice recognition software and/or scribes may still exist and should be interpreted within the appropriate context.

## 2020-09-28 NOTE — ED TRIAGE NOTES
"Chief Complaint   Patient presents with   • Suicidal Ideation     pt's father  today from a drug overdose, pt states he is not in the \"right state of mind.\"       BIB EMS to [facility], pt on monitor and in gown, belongings removed. Pt consists of: Suicidal ideation. Per EMS pt's father passed away today. Pt stating that his father had a heroin overdose today in front of him. Pt stating has had multiple deaths in his family and he does not feel in the right state of mind. Pt is from a Group home. Pt is stating does not have a specific plan, but wants to run away really far where no one can find him. Pt stating to have stressors with ex-wife as well. Pt has a hsitory of depression, anxiety and DM and PTSD.    All personal items removed. Pt in hospital clothing only. Removed all sharps and potentially dangerous items from room. No visitors. Curtains open at all times. Discussed suicide precautions with patient and they are able to verbalize understanding.     Medications given en route: N/A    /66   Pulse 83   Temp 36.6 °C (97.9 °F)   Resp 18   Ht 1.981 m (6' 6\")   Wt 119.3 kg (263 lb)   SpO2 94%   BMI 30.39 kg/m²   "

## 2020-09-28 NOTE — ED NOTES
Patient awake alert and oriented x 4, [facility] 15, bed in low position, unlabored breathing noted, no cough noted, on room air, sitter in hallway performing direct observation, denies pain, interactive with staff, interactions noted as appropriate, frequent rounding performed.

## 2020-09-29 ENCOUNTER — HOSPITAL ENCOUNTER (EMERGENCY)
Facility: MEDICAL CENTER | Age: 40
End: 2020-09-29
Attending: EMERGENCY MEDICINE
Payer: MEDICAID

## 2020-09-29 VITALS
RESPIRATION RATE: 16 BRPM | BODY MASS INDEX: 30.43 KG/M2 | SYSTOLIC BLOOD PRESSURE: 115 MMHG | DIASTOLIC BLOOD PRESSURE: 72 MMHG | WEIGHT: 263 LBS | HEIGHT: 78 IN | TEMPERATURE: 97.8 F | OXYGEN SATURATION: 96 % | HEART RATE: 82 BPM

## 2020-09-29 DIAGNOSIS — F32.A DEPRESSION, UNSPECIFIED DEPRESSION TYPE: ICD-10-CM

## 2020-09-29 DIAGNOSIS — F43.20 ADJUSTMENT DISORDER, UNSPECIFIED TYPE: ICD-10-CM

## 2020-09-29 DIAGNOSIS — R45.851 SUICIDAL IDEATION: ICD-10-CM

## 2020-09-29 PROCEDURE — 99285 EMERGENCY DEPT VISIT HI MDM: CPT

## 2020-09-29 PROCEDURE — 80307 DRUG TEST PRSMV CHEM ANLYZR: CPT

## 2020-09-29 PROCEDURE — 302970 POC BREATHALIZER: Performed by: EMERGENCY MEDICINE

## 2020-09-29 PROCEDURE — 90791 PSYCH DIAGNOSTIC EVALUATION: CPT

## 2020-09-29 ASSESSMENT — FIBROSIS 4 INDEX: FIB4 SCORE: 0.61

## 2020-09-29 ASSESSMENT — LIFESTYLE VARIABLES
CONSUMPTION TOTAL: INCOMPLETE
DO YOU DRINK ALCOHOL: YES
EVER HAD A DRINK FIRST THING IN THE MORNING TO STEADY YOUR NERVES TO GET RID OF A HANGOVER: NO
TOTAL SCORE: 0
HAVE PEOPLE ANNOYED YOU BY CRITICIZING YOUR DRINKING: NO
EVER FELT BAD OR GUILTY ABOUT YOUR DRINKING: NO
HAVE YOU EVER FELT YOU SHOULD CUT DOWN ON YOUR DRINKING: NO
TOTAL SCORE: 0
TOTAL SCORE: 0

## 2020-09-29 NOTE — ED NOTES
Patient asking for some ativan, he is feeling anxious. He spoke with Martha from [facility] and he denies SI now at this time. He states he has had time to think about it and doesn't want his kids to be raised without a dad and was acting impulsively after this mornings events.

## 2020-09-29 NOTE — ED PROVIDER NOTES
"ED Provider Note    CHIEF COMPLAINT  Chief Complaint   Patient presents with   • Psych Eval     BIBA with cc of SI after being fired this am from his job, pt has hx fo depression with previous suicide attempts        HPI  Norm Prabhakar is a 40 y.o. male who presents to the emergency department by ambulance for suicidal ideation.  Patient states that he is \"having images of jumping off of a bridge.\"  Patient describes increasing depression today after losing his job, and recently after the death of his father.  Patient has history of depression and PTSD, noncompliant with Zoloft.  [facility]  Patient states he did attempt suicide in  by jumping out of window, but his \"friends pulled me back in.\"    Drinks alcohol daily, denies any today because he was at work prior to this evaluation.  Smokes marijuana, denies other illicit drugs.    REVIEW OF SYSTEMS  See HPI for further details. All other systems are negative.     PAST MEDICAL HISTORY   has a past medical history of Anxiety, ASTHMA, Bipolar 1 disorder ([facility]), Depression, Diabetes (), Fall, Glaucoma, Glaucoma (), Hypothyroidism, Indigestion, Mental disorder, Murmur, Pneumonia, Psychiatric problem (), S/P thyroidectomy, Seizure ([facility]) (), Seizure disorder ([facility]), and Unspecified disorder of thyroid.    SOCIAL HISTORY  Social History     Tobacco Use   • Smoking status: Former Smoker     Packs/day: 0.25     Types: Cigarettes, Cigars     Quit date: 2020     Years since quittin.3   • Smokeless tobacco: Never Used   Substance and Sexual Activity   • Alcohol use: Yes     Frequency: 2-3 times a week   • Drug use: Yes     Types: Inhaled     Comment: marijuana   • Sexual activity: Not on file       SURGICAL HISTORY   has a past surgical history that includes eye surgery; thyroid lobectomy; and other.    CURRENT MEDICATIONS  Home Medications     Reviewed by Kimberly Sebastian R.N. (Registered Nurse) on 20 at 1043  Med List Status: " "Complete   Medication Last Dose Status   albuterol 108 (90 Base) MCG/ACT Aero Soln inhalation aerosol  Active   ALPRAZolam (XANAX) 0.25 MG Tab 9/22/2020 Active   aspirin EC (ECOTRIN) 81 MG Tablet Delayed Response 9/28/2020 Active   atorvastatin (LIPITOR) 80 MG tablet 9/28/2020 Active   budesonide-formoterol (SYMBICORT) 160-4.5 MCG/ACT Aerosol 9/28/2020 Active   busPIRone (BUSPAR) 10 MG Tab tablet 9/28/2020 Active   Cholecalciferol (VITAMIN D) 2000 UNIT Tab 9/28/2020 Active   divalproex (DEPAKOTE) 500 MG Tablet Delayed Response 9/28/2020 Active   divalproex (DEPAKOTE) 500 MG Tablet Delayed Response 9/28/2020 Active   Empagliflozin (JARDIANCE) 25 MG Tab 9/29/2020 Active   famotidine (PEPCID) 10 MG tablet 9/29/2020 Active   fenofibrate (ANTARA) 130 MG capsule 9/28/2020 Active   glimepiride (AMARYL) 4 MG Tab 9/29/2020 Active   hydrOXYzine HCl (ATARAX) 25 MG Tab 9/28/2020 Active   insulin glargine (BASAGLAR KWIKPEN) 100 UNIT/ML Solution Pen-injector injection 9/28/2020 Active   LATUDA 20 MG Tab 9/28/2020 Active   levothyroxine (SYNTHROID) 200 MCG Tab 9/29/2020 Active   levothyroxine (SYNTHROID) 25 MCG Tab 9/29/2020 Active   lisinopril (PRINIVIL) 10 MG Tab 9/28/2020 Active   metformin (GLUCOPHAGE) 1000 MG tablet 9/29/2020 Active   montelukast (SINGULAIR) 10 MG Tab 9/28/2020 Active   prazosin (MINIPRESS) 2 MG Cap 9/28/2020 Active   sertraline (ZOLOFT) 100 MG Tab 9/28/2020 Active   topiramate (TOPAMAX) 25 MG Tab 9/28/2020 Active   traZODone (DESYREL) 100 MG Tab 9/28/2020 Active                ALLERGIES  Allergies   Allergen Reactions   • Abilify Unspecified     \"Feeling tired, like I don't even know whats going on around me\"   • Fish      Pt reports fish causes him to be sick to his stomach     • Geodon [Ziprasidone Hcl]        PHYSICAL EXAM  VITAL SIGNS: /69   Pulse 85   Temp 36.6 °C (97.8 °F) (Temporal)   Resp 16   Ht 1.981 m (6' 6\")   Wt 119.3 kg (263 lb)   SpO2 95%   BMI 30.39 kg/m²   Pulse ox " interpretation: I interpret this pulse ox as normal.  Constitutional: Alert in no apparent distress.  Obese.  HENT: Normocephalic, atraumatic. Bilateral external ears normal, Nose normal. Moist mucous membranes.    Eyes: Conjunctiva normal.   Neck: Normal range of motion  Cardiovascular: Normal peripheral perfusion.  Thorax & Lungs: Nonlabored respirations.  Skin: Warm, Dry, No erythema, No rash.   Musculoskeletal: Good range of motion in all major joints.   Neurologic: Alert and oriented x4.  Moves worsening spontaneously..  Psychiatric: Depressed, suicidal.  Cooperative.      DIAGNOSTIC STUDIES / PROCEDURES    LABS  Results for orders placed or performed during the hospital encounter of 09/29/20   POC BREATHALIZER   Result Value Ref Range    POC Breathalizer 0.000 0.00 - 0.01 Percent       COURSE & MEDICAL DECISION MAKING  Nursing notes and vital signs were reviewed. (See chart for details)  The patients  records were reviewed, history was obtained from the patient;     Medical record review: Patient was seen at this facility 9/27/2024 suicidal ideation.  Per records did not meet criteria for legal hold, was arranged patient would restart intervention at [facility] and then could return to his group home.  He was given a single dose of Xanax in the emergency department.    ED evaluation is for suicidal ideation.  Patient has thoughts of how he would harm himself today, he has had increasing depression and stressors lately.  He is noncompliant with medications.  Legally sober today, although does describe frequent alcohol use as well.  Otherwise medically cleared for life skills evaluation.    11:53 AM Martha life skills, is aware the patient agreeable consultation.  Patient be signed out to my colleague, Dr. Duffy, for reevaluation of final disposition after this consultation.      FINAL IMPRESSION  (R45.851) Suicidal ideation  (F32.9) Depression, unspecified depression type      Electronically signed by: Daniela OCAMPO  ROSALINE Roy, 9/29/2020 11:34 AM      This dictation was created using voice recognition software. The accuracy of the dictation is limited to the abilities of the software. I expect there may be some errors of grammar and possibly content. The nursing notes were reviewed and certain aspects of this information were incorporated into this note.

## 2020-09-29 NOTE — ED NOTES
Patient appears comfortable, no needs at this time. Sitter remains in direct line of sight of patient.

## 2020-09-29 NOTE — ED NOTES
Patient has a plan of care for outpatient and has medication at home as needed for his anxiety. He was given his clothing and personal belongings as well as discharge paperwork after being released from his legal hold. Pt ambulatory to the lobby.

## 2020-09-29 NOTE — ED NOTES
All personal items removed from room.  Pt in hospital clothing only.  Removed all sharps and potentially dangerous items from room.  No visitors.  Curtains open at all times.  Discussed suicide precautions and restrictions with pt and they are able to verbalize understanding.  Patient has 1:1 sitter in direct line of sight.

## 2020-09-29 NOTE — ED PROVIDER NOTES
Detail Level: Detailed ED Provider Note    Patient was turned over to me awaiting life skills evaluation.  The patient was seen by Martha and was felt to be appropriate for discharge.  I did talk with the patient.  He admits that he was making some comments earlier today but has no intention of hurting himself.  He was upset because he lost his job.  However, he feels like he is pulling things together.  He has no plan.  Points out that he has children and they cannot grow up without dad.  Martha did set up outpatient follow-up for him, and he is quite comfortable with this.  Will be discharged home.   Add 79237 Cpt? (Important Note: In 2017 The Use Of 56472 Is Being Tracked By Cms To Determine Future Global Period Reimbursement For Global Periods): no
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
## 2020-09-29 NOTE — ED NOTES
Patient given water, seen by ERP. Notified patient again that we do need urine sample when able. Sitter remains in direct sight of patient.

## 2020-09-29 NOTE — ED TRIAGE NOTES
"Chief Complaint   Patient presents with   • Psych Eval     BIBA with cc of SI after being fired this am from his job, pt has hx fo depression with previous suicide attempts      Pt states he was thinking about \"jumping off one of the bridges\" today after he was let go from his job at a [employer].     /69   Pulse 85   Temp 36.6 °C (97.8 °F) (Temporal)   Resp 16   Ht 1.981 m (6' 6\")   Wt 119.3 kg (263 lb)   SpO2 95%   BMI 30.39 kg/m²       "

## 2020-10-05 ENCOUNTER — HOSPITAL ENCOUNTER (EMERGENCY)
Facility: MEDICAL CENTER | Age: 40
End: 2020-10-05
Attending: EMERGENCY MEDICINE | Admitting: EMERGENCY MEDICINE
Payer: MEDICAID

## 2020-10-05 VITALS
HEIGHT: 78 IN | BODY MASS INDEX: 32.97 KG/M2 | RESPIRATION RATE: 13 BRPM | OXYGEN SATURATION: 94 % | DIASTOLIC BLOOD PRESSURE: 52 MMHG | WEIGHT: 285 LBS | HEART RATE: 61 BPM | SYSTOLIC BLOOD PRESSURE: 98 MMHG | TEMPERATURE: 97.3 F

## 2020-10-05 DIAGNOSIS — R56.9 SEIZURE SECONDARY TO SUBTHERAPEUTIC ANTICONVULSANT MEDICATION (HCC): ICD-10-CM

## 2020-10-05 DIAGNOSIS — R56.9 SEIZURE (HCC): ICD-10-CM

## 2020-10-05 DIAGNOSIS — Z79.899 SEIZURE SECONDARY TO SUBTHERAPEUTIC ANTICONVULSANT MEDICATION (HCC): ICD-10-CM

## 2020-10-05 LAB
ALBUMIN SERPL BCP-MCNC: 4.1 G/DL (ref 3.2–4.9)
ALBUMIN/GLOB SERPL: 2 G/DL
ALP SERPL-CCNC: 49 U/L (ref 30–99)
ALT SERPL-CCNC: 35 U/L (ref 2–50)
ANION GAP SERPL CALC-SCNC: 14 MMOL/L (ref 7–16)
AST SERPL-CCNC: 42 U/L (ref 12–45)
BASOPHILS # BLD AUTO: 0.8 % (ref 0–1.8)
BASOPHILS # BLD: 0.07 K/UL (ref 0–0.12)
BILIRUB SERPL-MCNC: 0.2 MG/DL (ref 0.1–1.5)
BUN SERPL-MCNC: 22 MG/DL (ref 8–22)
CALCIUM SERPL-MCNC: 9.4 MG/DL (ref 8.5–10.5)
CHLORIDE SERPL-SCNC: 102 MMOL/L (ref 96–112)
CO2 SERPL-SCNC: 22 MMOL/L (ref 20–33)
CREAT SERPL-MCNC: 1.01 MG/DL (ref 0.5–1.4)
EKG IMPRESSION: NORMAL
EOSINOPHIL # BLD AUTO: 0.07 K/UL (ref 0–0.51)
EOSINOPHIL NFR BLD: 0.8 % (ref 0–6.9)
ERYTHROCYTE [DISTWIDTH] IN BLOOD BY AUTOMATED COUNT: 47.8 FL (ref 35.9–50)
GLOBULIN SER CALC-MCNC: 2.1 G/DL (ref 1.9–3.5)
GLUCOSE SERPL-MCNC: 171 MG/DL (ref 65–99)
HCT VFR BLD AUTO: 39.2 % (ref 42–52)
HGB BLD-MCNC: 12.9 G/DL (ref 14–18)
IMM GRANULOCYTES # BLD AUTO: 0.09 K/UL (ref 0–0.11)
IMM GRANULOCYTES NFR BLD AUTO: 1 % (ref 0–0.9)
LYMPHOCYTES # BLD AUTO: 3.09 K/UL (ref 1–4.8)
LYMPHOCYTES NFR BLD: 35.6 % (ref 22–41)
MCH RBC QN AUTO: 29.1 PG (ref 27–33)
MCHC RBC AUTO-ENTMCNC: 32.9 G/DL (ref 33.7–35.3)
MCV RBC AUTO: 88.3 FL (ref 81.4–97.8)
MONOCYTES # BLD AUTO: 1.06 K/UL (ref 0–0.85)
MONOCYTES NFR BLD AUTO: 12.2 % (ref 0–13.4)
NEUTROPHILS # BLD AUTO: 4.29 K/UL (ref 1.82–7.42)
NEUTROPHILS NFR BLD: 49.6 % (ref 44–72)
NRBC # BLD AUTO: 0 K/UL
NRBC BLD-RTO: 0 /100 WBC
PLATELET # BLD AUTO: 289 K/UL (ref 164–446)
PMV BLD AUTO: 10.1 FL (ref 9–12.9)
POTASSIUM SERPL-SCNC: 4 MMOL/L (ref 3.6–5.5)
PROT SERPL-MCNC: 6.2 G/DL (ref 6–8.2)
RBC # BLD AUTO: 4.44 M/UL (ref 4.7–6.1)
SODIUM SERPL-SCNC: 138 MMOL/L (ref 135–145)
VALPROATE SERPL-MCNC: 20.5 UG/ML (ref 50–100)
WBC # BLD AUTO: 8.7 K/UL (ref 4.8–10.8)

## 2020-10-05 PROCEDURE — 93005 ELECTROCARDIOGRAM TRACING: CPT | Performed by: EMERGENCY MEDICINE

## 2020-10-05 PROCEDURE — 80053 COMPREHEN METABOLIC PANEL: CPT

## 2020-10-05 PROCEDURE — 85025 COMPLETE CBC W/AUTO DIFF WBC: CPT

## 2020-10-05 PROCEDURE — A9270 NON-COVERED ITEM OR SERVICE: HCPCS | Performed by: EMERGENCY MEDICINE

## 2020-10-05 PROCEDURE — 700102 HCHG RX REV CODE 250 W/ 637 OVERRIDE(OP): Performed by: EMERGENCY MEDICINE

## 2020-10-05 PROCEDURE — 80164 ASSAY DIPROPYLACETIC ACD TOT: CPT

## 2020-10-05 PROCEDURE — 99284 EMERGENCY DEPT VISIT MOD MDM: CPT

## 2020-10-05 RX ORDER — ACETAMINOPHEN 325 MG/1
650 TABLET ORAL ONCE
Status: COMPLETED | OUTPATIENT
Start: 2020-10-05 | End: 2020-10-05

## 2020-10-05 RX ORDER — LORAZEPAM 1 MG/1
1 TABLET ORAL ONCE
Status: COMPLETED | OUTPATIENT
Start: 2020-10-05 | End: 2020-10-05

## 2020-10-05 RX ORDER — DIVALPROEX SODIUM 500 MG/1
1500 TABLET, DELAYED RELEASE ORAL ONCE
Status: COMPLETED | OUTPATIENT
Start: 2020-10-05 | End: 2020-10-05

## 2020-10-05 RX ADMIN — DIVALPROEX SODIUM 1500 MG: 500 TABLET, DELAYED RELEASE ORAL at 22:28

## 2020-10-05 RX ADMIN — LORAZEPAM 1 MG: 1 TABLET ORAL at 22:16

## 2020-10-05 RX ADMIN — ACETAMINOPHEN 650 MG: 325 TABLET, FILM COATED ORAL at 21:24

## 2020-10-05 ASSESSMENT — FIBROSIS 4 INDEX: FIB4 SCORE: 0.61

## 2020-10-06 NOTE — ED TRIAGE NOTES
"Chief Complaint   Patient presents with   • Seizure     patient states he normally has 2 seizure a months and had 2 today. Patient felt one coming on and went to lie down, awoke on floor with hematoma on back of head. Patient alert and oriented x4. VSS, patient states mild nausea and headache. Patient takes depakote and has not had any changes to medication or missed doses.        /67   Pulse 62   Temp 36.3 °C (97.3 °F) (Temporal)   Resp 18   Ht 1.981 m (6' 6\")   Wt (!) 129.3 kg (285 lb)   SpO2 97%   BMI 32.94 kg/m²     [facility] home with above complaint. PIV placed by EMS. Hx of seizure/epilepsy, unwitnessed seizure  "

## 2020-10-06 NOTE — ED PROVIDER NOTES
ED Provider Note    ER PROVIDER NOTE        CHIEF COMPLAINT  Chief Complaint   Patient presents with   • Seizure     patient states he normally has 2 seizure a months and had 2 today. Patient felt one coming on and went to lie down, awoke on floor with hematoma on back of head. Patient alert and oriented x4. VSS, patient states mild nausea and headache. Patient takes depakote and has not had any changes to medication or missed doses.        HPI  Norm Prabhakar is a 40 y.o. male who presents to the emergency department complaining of seizure.  Patient has longstanding history of seizures, is on Depakote, reports he had 2 today.  He did hit his head on the second 1, does report some mild nausea and headache at this point.  He states no neck pain, no focal weakness numbness or tingling.  No visual symptoms, no recent illness fevers chills cough or congestion, no missed doses or changes in doses recently    REVIEW OF SYSTEMS  Pertinent positives include seizure. Pertinent negatives include no weakness or numbness. See HPI for details. All other systems reviewed and are negative.    PAST MEDICAL HISTORY   has a past medical history of Anxiety, ASTHMA, Bipolar 1 disorder ([facility]), Depression, Diabetes ([facility]), Fall, Glaucoma, Glaucoma (1982), Hypothyroidism, Indigestion, Mental disorder, Murmur, Pneumonia, Psychiatric problem (2002), S/P thyroidectomy, Seizure ([facility]) (2010), Seizure disorder ([facility]), and Unspecified disorder of thyroid.    SURGICAL HISTORY   has a past surgical history that includes eye surgery; thyroid lobectomy; and other.    FAMILY HISTORY  Family History   Problem Relation Age of Onset   • Hypertension Mother    • Heart Disease Mother    • Lung Disease Mother    • Stroke Maternal Grandmother        SOCIAL HISTORY  Social History     Socioeconomic History   • Marital status: Single     Spouse name: Not on file   • Number of children: Not on file   • Years of education: Not on file   • Highest education  level: Not on file   Occupational History   • Not on file   Social Needs   • Financial resource strain: Somewhat hard   • Food insecurity     Worry: Sometimes true     Inability: Sometimes true   • Transportation needs     Medical: No     Non-medical: No   Tobacco Use   • Smoking status: Former Smoker     Packs/day: 0.25     Types: Cigarettes, Cigars     Quit date: 2020     Years since quittin.3   • Smokeless tobacco: Never Used   Substance and Sexual Activity   • Alcohol use: Yes     Frequency: 2-3 times a week   • Drug use: Not Currently     Types: Inhaled     Comment: marijuana   • Sexual activity: Not on file   Lifestyle   • Physical activity     Days per week: Not on file     Minutes per session: Not on file   • Stress: Not on file   Relationships   • Social connections     Talks on phone: Not on file     Gets together: Not on file     Attends Mosque service: Not on file     Active member of club or organization: Not on file     Attends meetings of clubs or organizations: Not on file     Relationship status: Not on file   • Intimate partner violence     Fear of current or ex partner: Not on file     Emotionally abused: Not on file     Physically abused: Not on file     Forced sexual activity: Not on file   Other Topics Concern   • Not on file   Social History Narrative   • Not on file      Social History     Substance and Sexual Activity   Drug Use Not Currently   • Types: Inhaled    Comment: marijuana       CURRENT MEDICATIONS  Home Medications     Reviewed by Kristal Eduardo R.N. (Registered Nurse) on 10/05/20 at 2035  Med List Status: Not Addressed   Medication Last Dose Status   albuterol 108 (90 Base) MCG/ACT Aero Soln inhalation aerosol  Active   ALPRAZolam (XANAX) 0.25 MG Tab  Active   aspirin EC (ECOTRIN) 81 MG Tablet Delayed Response  Active   atorvastatin (LIPITOR) 80 MG tablet  Active   budesonide-formoterol (SYMBICORT) 160-4.5 MCG/ACT Aerosol  Active   busPIRone (BUSPAR) 10 MG Tab tablet  " Active   Cholecalciferol (VITAMIN D) 2000 UNIT Tab  Active   divalproex (DEPAKOTE) 500 MG Tablet Delayed Response  Active   divalproex (DEPAKOTE) 500 MG Tablet Delayed Response  Active   Empagliflozin (JARDIANCE) 25 MG Tab  Active   famotidine (PEPCID) 10 MG tablet  Active   fenofibrate (ANTARA) 130 MG capsule  Active   glimepiride (AMARYL) 4 MG Tab  Active   hydrOXYzine HCl (ATARAX) 25 MG Tab  Active   insulin glargine (BASAGLAR KWIKPEN) 100 UNIT/ML Solution Pen-injector injection  Active   LATUDA 20 MG Tab  Active   levothyroxine (SYNTHROID) 200 MCG Tab  Active   levothyroxine (SYNTHROID) 25 MCG Tab  Active   lisinopril (PRINIVIL) 10 MG Tab  Active   metformin (GLUCOPHAGE) 1000 MG tablet  Active   montelukast (SINGULAIR) 10 MG Tab  Active   prazosin (MINIPRESS) 2 MG Cap  Active   sertraline (ZOLOFT) 100 MG Tab  Active   topiramate (TOPAMAX) 25 MG Tab  Active   traZODone (DESYREL) 100 MG Tab  Active                ALLERGIES  Allergies   Allergen Reactions   • Abilify Unspecified     \"Feeling tired, like I don't even know whats going on around me\"   • Fish      Pt reports fish causes him to be sick to his stomach     • Geodon [Ziprasidone Hcl]        PHYSICAL EXAM  VITAL SIGNS: BP (!) 98/52   Pulse 61   Temp 36.3 °C (97.3 °F) (Temporal)   Resp 13   Ht 1.981 m (6' 6\")   Wt (!) 129.3 kg (285 lb)   SpO2 94%   BMI 32.94 kg/m²   Pulse ox interpretation: I interpret this pulse ox as normal.    Constitutional: Alert in no apparent distress.  HENT: No signs of trauma, Bilateral external ears normal, Nose normal.   Eyes: Pupils are equal and reactive, Conjunctiva normal, Non-icteric.   Neck: Normal range of motion, No tenderness, Supple, No stridor.   Lymphatic: No lymphadenopathy noted.   Cardiovascular: Regular rate and rhythm, no murmurs.   Thorax & Lungs: Normal breath sounds, No respiratory distress, No wheezing, No chest tenderness.   Abdomen: Bowel sounds normal, Soft, No tenderness, No masses, No pulsatile " masses. No peritoneal signs.  Skin: Warm, Dry, No erythema, No rash.   Back: No bony tenderness, No CVA tenderness.   Extremities: Intact distal pulses, No edema, No tenderness, No cyanosis, Negative Florencio's sign.  Musculoskeletal: Good range of motion in all major joints. No tenderness to palpation or major deformities noted.   Neurologic: Alert, cranial nerves intact, speech is appropriate or not slurred, upper extremities bilaterally exhibit no drift, no dysmetria, 5 out of 5 strength with bilateral bicep/tricep/, sensation intact to light touch throughout upper extremities. Lower extremities strength 5 out of 5 thigh extension/flexion/abduction/adduction, knee extension/flexion, dorsiflexion plantar flexion. No clonus.  2+ patella reflexes.  sensation intact to light touch.  No focal deficits noted. Ambulates with steady gait, steady tandem gait  Psychiatric: Affect normal, Judgment normal, Mood normal.       DIAGNOSTIC STUDIES / PROCEDURES    EKG  Results for orders placed or performed during the hospital encounter of 10/05/20   CBC WITH DIFFERENTIAL   Result Value Ref Range    WBC 8.7 4.8 - 10.8 K/uL    RBC 4.44 (L) 4.70 - 6.10 M/uL    Hemoglobin 12.9 (L) 14.0 - 18.0 g/dL    Hematocrit 39.2 (L) 42.0 - 52.0 %    MCV 88.3 81.4 - 97.8 fL    MCH 29.1 27.0 - 33.0 pg    MCHC 32.9 (L) 33.7 - 35.3 g/dL    RDW 47.8 35.9 - 50.0 fL    Platelet Count 289 164 - 446 K/uL    MPV 10.1 9.0 - 12.9 fL    Neutrophils-Polys 49.60 44.00 - 72.00 %    Lymphocytes 35.60 22.00 - 41.00 %    Monocytes 12.20 0.00 - 13.40 %    Eosinophils 0.80 0.00 - 6.90 %    Basophils 0.80 0.00 - 1.80 %    Immature Granulocytes 1.00 (H) 0.00 - 0.90 %    Nucleated RBC 0.00 /100 WBC    Neutrophils (Absolute) 4.29 1.82 - 7.42 K/uL    Lymphs (Absolute) 3.09 1.00 - 4.80 K/uL    Monos (Absolute) 1.06 (H) 0.00 - 0.85 K/uL    Eos (Absolute) 0.07 0.00 - 0.51 K/uL    Baso (Absolute) 0.07 0.00 - 0.12 K/uL    Immature Granulocytes (abs) 0.09 0.00 - 0.11 K/uL     NRBC (Absolute) 0.00 K/uL   COMP METABOLIC PANEL   Result Value Ref Range    Sodium 138 135 - 145 mmol/L    Potassium 4.0 3.6 - 5.5 mmol/L    Chloride 102 96 - 112 mmol/L    Co2 22 20 - 33 mmol/L    Anion Gap 14.0 7.0 - 16.0    Glucose 171 (H) 65 - 99 mg/dL    Bun 22 8 - 22 mg/dL    Creatinine 1.01 0.50 - 1.40 mg/dL    Calcium 9.4 8.5 - 10.5 mg/dL    AST(SGOT) 42 12 - 45 U/L    ALT(SGPT) 35 2 - 50 U/L    Alkaline Phosphatase 49 30 - 99 U/L    Total Bilirubin 0.2 0.1 - 1.5 mg/dL    Albumin 4.1 3.2 - 4.9 g/dL    Total Protein 6.2 6.0 - 8.2 g/dL    Globulin 2.1 1.9 - 3.5 g/dL    A-G Ratio 2.0 g/dL   VALPROIC ACID   Result Value Ref Range    Valproic Acid 20.5 (L) 50.0 - 100.0 ug/mL   ESTIMATED GFR   Result Value Ref Range    GFR If African American >60 >60 mL/min/1.73 m 2    GFR If Non African American >60 >60 mL/min/1.73 m 2   EKG (Now)   Result Value Ref Range    Report       [facility] Emergency Dept.    Test Date:  2020-10-05  Pt Name:    HOLLY KIM                 Department: ER  MRN:        [id number]                      Room:       [facility]  Gender:     Male                         Technician: [id number]  :        1980                   Requested By:CORINNE LANDRY  Order #:    [id number]                    Reading MD: CORINNE LANDRY MD    Measurements  Intervals                                Axis  Rate:       63                           P:          42  OK:         172                          QRS:        4  QRSD:       118                          T:          20  QT:         380  QTc:        389    Interpretive Statements  SINUS RHYTHM  NONSPECIFIC INTRAVENTRICULAR CONDUCTION DELAY  Compared to ECG 2020 14:15:29  No significant changes  Electronically Signed On 10-5-2020 22:39:35 PDT by CORINNE LANDRY MD           RADIOLOGY  No orders to display     The radiologist's interpretation of all radiological studies have been reviewed and images independently viewed by me.    COURSE &  MEDICAL DECISION MAKING  Nursing notes, VS, PMSFHx reviewed in chart.    9:05 PM Patient seen and examined at bedside. Patient will be treated with Tylenol. Ordered for CBC, CMP, valproic acid level, ECG to evaluate his symptoms.     10:39 PM  Patient reevaluated, is comfortable, has been updated on all results, given his seizure medication and will plan for discharge    This patient was cared for during the COVID-19 pandemic.  History and physical exam may be limited/truncated by the inherent challenges of PPE and the need to decrease staff exposure to novel coronavirus.  Some aspects of disease management may be different to protect staff and help slow the spread of disease. I verified that, if possible, the patient was wearing a mask and I was wearing appropriate PPE every time I encountered the patient.     Current [facility] COVID19 protocol followed        Decision Making:  This is a 40 y.o. male presenting with seizure.  It appears the patient is subtherapeutic on his Depakote, likely causing his seizures.  He was given Depakote here, will plan to discharge and follow-up with his primary care.  There is no evidence of electrolyte or metabolic derangement that would have contributed to his seizure, hyponatremia or similar.  ECG without arrhythmia to suggest convulsive syncope.  Patient has no focal neurologic deficits on exam will be low risk by Evans CT head criteria for intracranial bleed     The patient will return for new or worsening symptoms and is stable at the time of discharge.    The patient is referred to a primary physician for blood pressure management, diabetic screening, and for all other preventative health concerns.      DISPOSITION:  Patient will be discharged home in stable condition.    FOLLOW UP:  Krystin Gupta A.P.R.NLyric  [address] [place] [address] [id number] [phone]    In 1 week        OUTPATIENT MEDICATIONS:  New Prescriptions    No medications on file         FINAL IMPRESSION  1.  Seizure (HCC)    2. Seizure secondary to subtherapeutic anticonvulsant medication (HCC)          The note accurately reflects work and decisions made by me.  Sal Pereyra M.D.  10/5/2020  10:43 PM

## 2020-10-16 ENCOUNTER — HOSPITAL ENCOUNTER (INPATIENT)
Facility: MEDICAL CENTER | Age: 40
LOS: 12 days | DRG: 948 | End: 2020-10-30
Attending: EMERGENCY MEDICINE | Admitting: STUDENT IN AN ORGANIZED HEALTH CARE EDUCATION/TRAINING PROGRAM
Payer: MEDICAID

## 2020-10-16 ENCOUNTER — APPOINTMENT (OUTPATIENT)
Dept: RADIOLOGY | Facility: MEDICAL CENTER | Age: 40
DRG: 948 | End: 2020-10-16
Attending: EMERGENCY MEDICINE
Payer: MEDICAID

## 2020-10-16 ENCOUNTER — APPOINTMENT (OUTPATIENT)
Dept: NEUROLOGY | Facility: MEDICAL CENTER | Age: 40
End: 2020-10-16
Payer: MEDICAID

## 2020-10-16 DIAGNOSIS — Z86.59 HISTORY OF PSYCHIATRIC DISORDER: ICD-10-CM

## 2020-10-16 DIAGNOSIS — I10 ESSENTIAL HYPERTENSION: ICD-10-CM

## 2020-10-16 DIAGNOSIS — R53.1 ACUTE RIGHT-SIDED WEAKNESS: ICD-10-CM

## 2020-10-16 DIAGNOSIS — G81.90 HEMIPARESIS, UNSPECIFIED HEMIPARESIS ETIOLOGY, UNSPECIFIED LATERALITY (HCC): ICD-10-CM

## 2020-10-16 DIAGNOSIS — F99 PSYCHIATRIC PROBLEM: ICD-10-CM

## 2020-10-16 DIAGNOSIS — E66.01 MORBID OBESITY (HCC): ICD-10-CM

## 2020-10-16 DIAGNOSIS — Z86.39 HISTORY OF TYPE 2 DIABETES MELLITUS: ICD-10-CM

## 2020-10-16 PROBLEM — E11.65 TYPE 2 DIABETES MELLITUS WITH HYPERGLYCEMIA, WITHOUT LONG-TERM CURRENT USE OF INSULIN (HCC): Status: ACTIVE | Noted: 2019-01-03

## 2020-10-16 LAB
ABO GROUP BLD: NORMAL
ALBUMIN SERPL BCP-MCNC: 3.9 G/DL (ref 3.2–4.9)
ALBUMIN/GLOB SERPL: 1.9 G/DL
ALP SERPL-CCNC: 50 U/L (ref 30–99)
ALT SERPL-CCNC: 28 U/L (ref 2–50)
ANION GAP SERPL CALC-SCNC: 15 MMOL/L (ref 7–16)
APTT PPP: 27 SEC (ref 24.7–36)
AST SERPL-CCNC: 25 U/L (ref 12–45)
BASOPHILS # BLD AUTO: 0.7 % (ref 0–1.8)
BASOPHILS # BLD: 0.06 K/UL (ref 0–0.12)
BILIRUB SERPL-MCNC: <0.2 MG/DL (ref 0.1–1.5)
BLD GP AB SCN SERPL QL: NORMAL
BUN SERPL-MCNC: 30 MG/DL (ref 8–22)
CALCIUM SERPL-MCNC: 9.2 MG/DL (ref 8.5–10.5)
CHLORIDE SERPL-SCNC: 97 MMOL/L (ref 96–112)
CO2 SERPL-SCNC: 21 MMOL/L (ref 20–33)
COVID ORDER STATUS COVID19: NORMAL
CREAT SERPL-MCNC: 1.01 MG/DL (ref 0.5–1.4)
EKG IMPRESSION: NORMAL
EOSINOPHIL # BLD AUTO: 0.1 K/UL (ref 0–0.51)
EOSINOPHIL NFR BLD: 1.2 % (ref 0–6.9)
ERYTHROCYTE [DISTWIDTH] IN BLOOD BY AUTOMATED COUNT: 48.6 FL (ref 35.9–50)
GLOBULIN SER CALC-MCNC: 2.1 G/DL (ref 1.9–3.5)
GLUCOSE SERPL-MCNC: 191 MG/DL (ref 65–99)
HCT VFR BLD AUTO: 37.6 % (ref 42–52)
HGB BLD-MCNC: 12.2 G/DL (ref 14–18)
IMM GRANULOCYTES # BLD AUTO: 0.12 K/UL (ref 0–0.11)
IMM GRANULOCYTES NFR BLD AUTO: 1.4 % (ref 0–0.9)
INR PPP: 0.97 (ref 0.87–1.13)
LYMPHOCYTES # BLD AUTO: 2.89 K/UL (ref 1–4.8)
LYMPHOCYTES NFR BLD: 34 % (ref 22–41)
MCH RBC QN AUTO: 29.3 PG (ref 27–33)
MCHC RBC AUTO-ENTMCNC: 32.4 G/DL (ref 33.7–35.3)
MCV RBC AUTO: 90.4 FL (ref 81.4–97.8)
MONOCYTES # BLD AUTO: 0.76 K/UL (ref 0–0.85)
MONOCYTES NFR BLD AUTO: 9 % (ref 0–13.4)
NEUTROPHILS # BLD AUTO: 4.56 K/UL (ref 1.82–7.42)
NEUTROPHILS NFR BLD: 53.7 % (ref 44–72)
NRBC # BLD AUTO: 0 K/UL
NRBC BLD-RTO: 0 /100 WBC
PLATELET # BLD AUTO: 273 K/UL (ref 164–446)
PMV BLD AUTO: 10 FL (ref 9–12.9)
POTASSIUM SERPL-SCNC: 4 MMOL/L (ref 3.6–5.5)
PROT SERPL-MCNC: 6 G/DL (ref 6–8.2)
PROTHROMBIN TIME: 13.2 SEC (ref 12–14.6)
RBC # BLD AUTO: 4.16 M/UL (ref 4.7–6.1)
RH BLD: NORMAL
SODIUM SERPL-SCNC: 133 MMOL/L (ref 135–145)
TROPONIN T SERPL-MCNC: 18 NG/L (ref 6–19)
WBC # BLD AUTO: 8.5 K/UL (ref 4.8–10.8)

## 2020-10-16 PROCEDURE — 85610 PROTHROMBIN TIME: CPT

## 2020-10-16 PROCEDURE — 96366 THER/PROPH/DIAG IV INF ADDON: CPT

## 2020-10-16 PROCEDURE — 99220 PR INITIAL OBSERVATION CARE,LEVL III: CPT | Performed by: INTERNAL MEDICINE

## 2020-10-16 PROCEDURE — 96367 TX/PROPH/DG ADDL SEQ IV INF: CPT

## 2020-10-16 PROCEDURE — 70450 CT HEAD/BRAIN W/O DYE: CPT

## 2020-10-16 PROCEDURE — 86850 RBC ANTIBODY SCREEN: CPT

## 2020-10-16 PROCEDURE — 96375 TX/PRO/DX INJ NEW DRUG ADDON: CPT | Mod: XU

## 2020-10-16 PROCEDURE — 86900 BLOOD TYPING SEROLOGIC ABO: CPT

## 2020-10-16 PROCEDURE — 96365 THER/PROPH/DIAG IV INF INIT: CPT | Mod: XU

## 2020-10-16 PROCEDURE — G0378 HOSPITAL OBSERVATION PER HR: HCPCS

## 2020-10-16 PROCEDURE — 71045 X-RAY EXAM CHEST 1 VIEW: CPT

## 2020-10-16 PROCEDURE — 80053 COMPREHEN METABOLIC PANEL: CPT

## 2020-10-16 PROCEDURE — 70496 CT ANGIOGRAPHY HEAD: CPT

## 2020-10-16 PROCEDURE — 86901 BLOOD TYPING SEROLOGIC RH(D): CPT

## 2020-10-16 PROCEDURE — 85025 COMPLETE CBC W/AUTO DIFF WBC: CPT

## 2020-10-16 PROCEDURE — 700111 HCHG RX REV CODE 636 W/ 250 OVERRIDE (IP): Performed by: PSYCHIATRY & NEUROLOGY

## 2020-10-16 PROCEDURE — 99285 EMERGENCY DEPT VISIT HI MDM: CPT

## 2020-10-16 PROCEDURE — 99244 OFF/OP CNSLTJ NEW/EST MOD 40: CPT | Performed by: PSYCHIATRY & NEUROLOGY

## 2020-10-16 PROCEDURE — U0003 INFECTIOUS AGENT DETECTION BY NUCLEIC ACID (DNA OR RNA); SEVERE ACUTE RESPIRATORY SYNDROME CORONAVIRUS 2 (SARS-COV-2) (CORONAVIRUS DISEASE [COVID-19]), AMPLIFIED PROBE TECHNIQUE, MAKING USE OF HIGH THROUGHPUT TECHNOLOGIES AS DESCRIBED BY CMS-2020-01-R: HCPCS

## 2020-10-16 PROCEDURE — 70498 CT ANGIOGRAPHY NECK: CPT

## 2020-10-16 PROCEDURE — 0042T CT-CEREBRAL PERFUSION ANALYSIS: CPT

## 2020-10-16 PROCEDURE — 84484 ASSAY OF TROPONIN QUANT: CPT

## 2020-10-16 PROCEDURE — 700117 HCHG RX CONTRAST REV CODE 255: Performed by: EMERGENCY MEDICINE

## 2020-10-16 PROCEDURE — C9803 HOPD COVID-19 SPEC COLLECT: HCPCS | Performed by: INTERNAL MEDICINE

## 2020-10-16 PROCEDURE — 93005 ELECTROCARDIOGRAM TRACING: CPT | Performed by: EMERGENCY MEDICINE

## 2020-10-16 PROCEDURE — 85730 THROMBOPLASTIN TIME PARTIAL: CPT

## 2020-10-16 RX ORDER — BISACODYL 10 MG
10 SUPPOSITORY, RECTAL RECTAL
Status: DISCONTINUED | OUTPATIENT
Start: 2020-10-16 | End: 2020-10-30 | Stop reason: HOSPADM

## 2020-10-16 RX ORDER — ASPIRIN 325 MG
325 TABLET ORAL DAILY
Status: DISCONTINUED | OUTPATIENT
Start: 2020-10-17 | End: 2020-10-17

## 2020-10-16 RX ORDER — ACETAMINOPHEN 325 MG/1
650 TABLET ORAL EVERY 6 HOURS PRN
Status: DISCONTINUED | OUTPATIENT
Start: 2020-10-16 | End: 2020-10-30 | Stop reason: HOSPADM

## 2020-10-16 RX ORDER — LABETALOL HYDROCHLORIDE 5 MG/ML
10 INJECTION, SOLUTION INTRAVENOUS EVERY 4 HOURS PRN
Status: DISCONTINUED | OUTPATIENT
Start: 2020-10-16 | End: 2020-10-30 | Stop reason: HOSPADM

## 2020-10-16 RX ORDER — PROMETHAZINE HYDROCHLORIDE 25 MG/1
12.5-25 SUPPOSITORY RECTAL EVERY 4 HOURS PRN
Status: DISCONTINUED | OUTPATIENT
Start: 2020-10-16 | End: 2020-10-30 | Stop reason: HOSPADM

## 2020-10-16 RX ORDER — ATORVASTATIN CALCIUM 80 MG/1
80 TABLET, FILM COATED ORAL EVERY EVENING
Status: DISCONTINUED | OUTPATIENT
Start: 2020-10-16 | End: 2020-10-17

## 2020-10-16 RX ORDER — ONDANSETRON 4 MG/1
4 TABLET, ORALLY DISINTEGRATING ORAL EVERY 4 HOURS PRN
Status: DISCONTINUED | OUTPATIENT
Start: 2020-10-16 | End: 2020-10-30 | Stop reason: HOSPADM

## 2020-10-16 RX ORDER — AMOXICILLIN 250 MG
2 CAPSULE ORAL 2 TIMES DAILY
Status: DISCONTINUED | OUTPATIENT
Start: 2020-10-16 | End: 2020-10-30 | Stop reason: HOSPADM

## 2020-10-16 RX ORDER — MAGNESIUM SULFATE HEPTAHYDRATE 40 MG/ML
2 INJECTION, SOLUTION INTRAVENOUS ONCE
Status: COMPLETED | OUTPATIENT
Start: 2020-10-16 | End: 2020-10-16

## 2020-10-16 RX ORDER — PROMETHAZINE HYDROCHLORIDE 25 MG/1
12.5-25 TABLET ORAL EVERY 4 HOURS PRN
Status: DISCONTINUED | OUTPATIENT
Start: 2020-10-16 | End: 2020-10-30 | Stop reason: HOSPADM

## 2020-10-16 RX ORDER — LABETALOL HYDROCHLORIDE 5 MG/ML
10 INJECTION, SOLUTION INTRAVENOUS EVERY 6 HOURS PRN
Status: DISCONTINUED | OUTPATIENT
Start: 2020-10-16 | End: 2020-10-17

## 2020-10-16 RX ORDER — DIVALPROEX SODIUM 500 MG/1
500-1500 TABLET, DELAYED RELEASE ORAL 2 TIMES DAILY
COMMUNITY
End: 2021-02-03

## 2020-10-16 RX ORDER — POLYETHYLENE GLYCOL 3350 17 G/17G
1 POWDER, FOR SOLUTION ORAL
Status: DISCONTINUED | OUTPATIENT
Start: 2020-10-16 | End: 2020-10-30 | Stop reason: HOSPADM

## 2020-10-16 RX ORDER — ACETAMINOPHEN 500 MG
1000 TABLET ORAL EVERY 6 HOURS PRN
COMMUNITY
End: 2021-02-03

## 2020-10-16 RX ORDER — ASPIRIN 81 MG/1
324 TABLET, CHEWABLE ORAL DAILY
Status: DISCONTINUED | OUTPATIENT
Start: 2020-10-17 | End: 2020-10-17

## 2020-10-16 RX ORDER — METOCLOPRAMIDE HYDROCHLORIDE 5 MG/ML
10 INJECTION INTRAMUSCULAR; INTRAVENOUS ONCE
Status: COMPLETED | OUTPATIENT
Start: 2020-10-16 | End: 2020-10-16

## 2020-10-16 RX ORDER — PROCHLORPERAZINE EDISYLATE 5 MG/ML
5-10 INJECTION INTRAMUSCULAR; INTRAVENOUS EVERY 4 HOURS PRN
Status: DISCONTINUED | OUTPATIENT
Start: 2020-10-16 | End: 2020-10-30 | Stop reason: HOSPADM

## 2020-10-16 RX ORDER — OMEPRAZOLE 20 MG/1
20 CAPSULE, DELAYED RELEASE ORAL 2 TIMES DAILY
COMMUNITY
End: 2021-02-03

## 2020-10-16 RX ORDER — ONDANSETRON 2 MG/ML
4 INJECTION INTRAMUSCULAR; INTRAVENOUS EVERY 4 HOURS PRN
Status: DISCONTINUED | OUTPATIENT
Start: 2020-10-16 | End: 2020-10-30 | Stop reason: HOSPADM

## 2020-10-16 RX ORDER — DIPHENHYDRAMINE HYDROCHLORIDE 50 MG/ML
25 INJECTION INTRAMUSCULAR; INTRAVENOUS ONCE
Status: COMPLETED | OUTPATIENT
Start: 2020-10-16 | End: 2020-10-16

## 2020-10-16 RX ORDER — ASPIRIN 300 MG/1
300 SUPPOSITORY RECTAL DAILY
Status: DISCONTINUED | OUTPATIENT
Start: 2020-10-17 | End: 2020-10-17

## 2020-10-16 RX ORDER — DEXTROSE MONOHYDRATE 25 G/50ML
50 INJECTION, SOLUTION INTRAVENOUS
Status: DISCONTINUED | OUTPATIENT
Start: 2020-10-16 | End: 2020-10-18

## 2020-10-16 RX ADMIN — MAGNESIUM SULFATE 2 G: 2 INJECTION INTRAVENOUS at 21:59

## 2020-10-16 RX ADMIN — DIPHENHYDRAMINE HYDROCHLORIDE 25 MG: 50 INJECTION INTRAMUSCULAR; INTRAVENOUS at 22:00

## 2020-10-16 RX ADMIN — METOCLOPRAMIDE 10 MG: 5 INJECTION, SOLUTION INTRAMUSCULAR; INTRAVENOUS at 22:00

## 2020-10-16 RX ADMIN — IOHEXOL 40 ML: 350 INJECTION, SOLUTION INTRAVENOUS at 21:00

## 2020-10-16 RX ADMIN — IOHEXOL 80 ML: 350 INJECTION, SOLUTION INTRAVENOUS at 21:00

## 2020-10-16 ASSESSMENT — FIBROSIS 4 INDEX: FIB4 SCORE: 0.93

## 2020-10-17 ENCOUNTER — APPOINTMENT (OUTPATIENT)
Dept: RADIOLOGY | Facility: MEDICAL CENTER | Age: 40
DRG: 948 | End: 2020-10-17
Attending: STUDENT IN AN ORGANIZED HEALTH CARE EDUCATION/TRAINING PROGRAM
Payer: MEDICAID

## 2020-10-17 PROBLEM — G40.909 SEIZURE DISORDER (HCC): Status: ACTIVE | Noted: 2020-10-17

## 2020-10-17 LAB
GLUCOSE BLD-MCNC: 110 MG/DL (ref 65–99)
GLUCOSE BLD-MCNC: 114 MG/DL (ref 65–99)
GLUCOSE BLD-MCNC: 135 MG/DL (ref 65–99)
GLUCOSE BLD-MCNC: 155 MG/DL (ref 65–99)
MAGNESIUM SERPL-MCNC: 2.3 MG/DL (ref 1.5–2.5)
SARS-COV-2 RNA RESP QL NAA+PROBE: NOTDETECTED
SPECIMEN SOURCE: NORMAL

## 2020-10-17 PROCEDURE — A9270 NON-COVERED ITEM OR SERVICE: HCPCS | Performed by: INTERNAL MEDICINE

## 2020-10-17 PROCEDURE — 90686 IIV4 VACC NO PRSV 0.5 ML IM: CPT | Performed by: INTERNAL MEDICINE

## 2020-10-17 PROCEDURE — 36415 COLL VENOUS BLD VENIPUNCTURE: CPT

## 2020-10-17 PROCEDURE — 700111 HCHG RX REV CODE 636 W/ 250 OVERRIDE (IP): Performed by: INTERNAL MEDICINE

## 2020-10-17 PROCEDURE — 700102 HCHG RX REV CODE 250 W/ 637 OVERRIDE(OP): Performed by: STUDENT IN AN ORGANIZED HEALTH CARE EDUCATION/TRAINING PROGRAM

## 2020-10-17 PROCEDURE — 70551 MRI BRAIN STEM W/O DYE: CPT

## 2020-10-17 PROCEDURE — A9270 NON-COVERED ITEM OR SERVICE: HCPCS | Performed by: STUDENT IN AN ORGANIZED HEALTH CARE EDUCATION/TRAINING PROGRAM

## 2020-10-17 PROCEDURE — G0378 HOSPITAL OBSERVATION PER HR: HCPCS

## 2020-10-17 PROCEDURE — 3E02340 INTRODUCTION OF INFLUENZA VACCINE INTO MUSCLE, PERCUTANEOUS APPROACH: ICD-10-PCS | Performed by: INTERNAL MEDICINE

## 2020-10-17 PROCEDURE — 92610 EVALUATE SWALLOWING FUNCTION: CPT

## 2020-10-17 PROCEDURE — 99225 PR SUBSEQUENT OBSERVATION CARE,LEVEL II: CPT | Performed by: NURSE PRACTITIONER

## 2020-10-17 PROCEDURE — 700102 HCHG RX REV CODE 250 W/ 637 OVERRIDE(OP): Performed by: INTERNAL MEDICINE

## 2020-10-17 PROCEDURE — 90471 IMMUNIZATION ADMIN: CPT

## 2020-10-17 PROCEDURE — 96372 THER/PROPH/DIAG INJ SC/IM: CPT

## 2020-10-17 PROCEDURE — 82962 GLUCOSE BLOOD TEST: CPT

## 2020-10-17 PROCEDURE — 83735 ASSAY OF MAGNESIUM: CPT

## 2020-10-17 PROCEDURE — 99225 PR SUBSEQUENT OBSERVATION CARE,LEVEL II: CPT | Performed by: STUDENT IN AN ORGANIZED HEALTH CARE EDUCATION/TRAINING PROGRAM

## 2020-10-17 RX ORDER — TRAZODONE HYDROCHLORIDE 100 MG/1
100 TABLET ORAL NIGHTLY PRN
Status: DISCONTINUED | OUTPATIENT
Start: 2020-10-17 | End: 2020-10-30 | Stop reason: HOSPADM

## 2020-10-17 RX ORDER — BUDESONIDE AND FORMOTEROL FUMARATE DIHYDRATE 160; 4.5 UG/1; UG/1
2 AEROSOL RESPIRATORY (INHALATION) 2 TIMES DAILY
Status: DISCONTINUED | OUTPATIENT
Start: 2020-10-17 | End: 2020-10-30 | Stop reason: HOSPADM

## 2020-10-17 RX ORDER — ATORVASTATIN CALCIUM 80 MG/1
80 TABLET, FILM COATED ORAL EVERY EVENING
Status: DISCONTINUED | OUTPATIENT
Start: 2020-10-17 | End: 2020-10-30 | Stop reason: HOSPADM

## 2020-10-17 RX ORDER — MONTELUKAST SODIUM 10 MG/1
10 TABLET ORAL EVERY EVENING
Status: DISCONTINUED | OUTPATIENT
Start: 2020-10-17 | End: 2020-10-30 | Stop reason: HOSPADM

## 2020-10-17 RX ORDER — LURASIDONE HYDROCHLORIDE 20 MG/1
20 TABLET, FILM COATED ORAL
Status: DISCONTINUED | OUTPATIENT
Start: 2020-10-17 | End: 2020-10-30 | Stop reason: HOSPADM

## 2020-10-17 RX ORDER — FENOFIBRATE 134 MG/1
134 CAPSULE ORAL EVERY EVENING
Status: DISCONTINUED | OUTPATIENT
Start: 2020-10-17 | End: 2020-10-30 | Stop reason: HOSPADM

## 2020-10-17 RX ORDER — SERTRALINE HYDROCHLORIDE 100 MG/1
100 TABLET, FILM COATED ORAL DAILY
Status: DISCONTINUED | OUTPATIENT
Start: 2020-10-17 | End: 2020-10-30 | Stop reason: HOSPADM

## 2020-10-17 RX ORDER — BUSPIRONE HYDROCHLORIDE 10 MG/1
10 TABLET ORAL 3 TIMES DAILY
Status: DISCONTINUED | OUTPATIENT
Start: 2020-10-17 | End: 2020-10-30 | Stop reason: HOSPADM

## 2020-10-17 RX ORDER — DIVALPROEX SODIUM 500 MG/1
500-1500 TABLET, DELAYED RELEASE ORAL 2 TIMES DAILY
Status: DISCONTINUED | OUTPATIENT
Start: 2020-10-17 | End: 2020-10-17

## 2020-10-17 RX ORDER — LEVOTHYROXINE SODIUM 0.2 MG/1
200 TABLET ORAL
Status: DISCONTINUED | OUTPATIENT
Start: 2020-10-17 | End: 2020-10-30 | Stop reason: HOSPADM

## 2020-10-17 RX ORDER — LEVOTHYROXINE SODIUM 0.03 MG/1
25 TABLET ORAL
Status: DISCONTINUED | OUTPATIENT
Start: 2020-10-17 | End: 2020-10-30 | Stop reason: HOSPADM

## 2020-10-17 RX ORDER — LISINOPRIL 10 MG/1
10 TABLET ORAL DAILY
Status: DISCONTINUED | OUTPATIENT
Start: 2020-10-17 | End: 2020-10-30 | Stop reason: HOSPADM

## 2020-10-17 RX ORDER — DIVALPROEX SODIUM 500 MG/1
1500 TABLET, DELAYED RELEASE ORAL EVERY EVENING
Status: DISCONTINUED | OUTPATIENT
Start: 2020-10-17 | End: 2020-10-30 | Stop reason: HOSPADM

## 2020-10-17 RX ORDER — TRAZODONE HYDROCHLORIDE 100 MG/1
100 TABLET ORAL NIGHTLY PRN
Status: DISCONTINUED | OUTPATIENT
Start: 2020-10-17 | End: 2020-10-17

## 2020-10-17 RX ORDER — DIVALPROEX SODIUM 500 MG/1
500 TABLET, DELAYED RELEASE ORAL EVERY MORNING
Status: DISCONTINUED | OUTPATIENT
Start: 2020-10-17 | End: 2020-10-30 | Stop reason: HOSPADM

## 2020-10-17 RX ORDER — TOPIRAMATE 25 MG/1
25 TABLET ORAL DAILY
Status: DISCONTINUED | OUTPATIENT
Start: 2020-10-17 | End: 2020-10-30 | Stop reason: HOSPADM

## 2020-10-17 RX ADMIN — LURASIDONE HYDROCHLORIDE 20 MG: 20 TABLET, FILM COATED ORAL at 19:48

## 2020-10-17 RX ADMIN — DOCUSATE SODIUM 50 MG AND SENNOSIDES 8.6 MG 2 TABLET: 8.6; 5 TABLET, FILM COATED ORAL at 09:20

## 2020-10-17 RX ADMIN — ASPIRIN 81 MG: 81 TABLET, COATED ORAL at 09:21

## 2020-10-17 RX ADMIN — TOPIRAMATE 25 MG: 25 TABLET, FILM COATED ORAL at 09:24

## 2020-10-17 RX ADMIN — LEVOTHYROXINE SODIUM 25 MCG: 25 TABLET ORAL at 09:21

## 2020-10-17 RX ADMIN — DIVALPROEX SODIUM 1500 MG: 500 TABLET, DELAYED RELEASE ORAL at 17:51

## 2020-10-17 RX ADMIN — BUSPIRONE HYDROCHLORIDE 10 MG: 10 TABLET ORAL at 17:52

## 2020-10-17 RX ADMIN — ENOXAPARIN SODIUM 40 MG: 40 INJECTION SUBCUTANEOUS at 06:01

## 2020-10-17 RX ADMIN — BUSPIRONE HYDROCHLORIDE 10 MG: 10 TABLET ORAL at 09:23

## 2020-10-17 RX ADMIN — LEVOTHYROXINE SODIUM 200 MCG: 0.2 TABLET ORAL at 09:22

## 2020-10-17 RX ADMIN — BUDESONIDE AND FORMOTEROL FUMARATE DIHYDRATE 2 PUFF: 160; 4.5 AEROSOL RESPIRATORY (INHALATION) at 18:04

## 2020-10-17 RX ADMIN — BUSPIRONE HYDROCHLORIDE 10 MG: 10 TABLET ORAL at 12:24

## 2020-10-17 RX ADMIN — MONTELUKAST 10 MG: 10 TABLET, FILM COATED ORAL at 17:52

## 2020-10-17 RX ADMIN — SERTRALINE HYDROCHLORIDE 100 MG: 100 TABLET ORAL at 09:23

## 2020-10-17 RX ADMIN — LISINOPRIL 10 MG: 10 TABLET ORAL at 09:23

## 2020-10-17 RX ADMIN — ACETAMINOPHEN 650 MG: 325 TABLET, FILM COATED ORAL at 19:49

## 2020-10-17 RX ADMIN — FENOFIBRATE 134 MG: 134 CAPSULE ORAL at 19:48

## 2020-10-17 RX ADMIN — INSULIN HUMAN 1 UNITS: 100 INJECTION, SOLUTION PARENTERAL at 12:25

## 2020-10-17 RX ADMIN — ATORVASTATIN CALCIUM 80 MG: 80 TABLET, FILM COATED ORAL at 17:52

## 2020-10-17 RX ADMIN — DOCUSATE SODIUM 50 MG AND SENNOSIDES 8.6 MG 2 TABLET: 8.6; 5 TABLET, FILM COATED ORAL at 17:52

## 2020-10-17 RX ADMIN — DIVALPROEX SODIUM 500 MG: 500 TABLET, DELAYED RELEASE ORAL at 09:24

## 2020-10-17 RX ADMIN — INFLUENZA A VIRUS A/GUANGDONG-MAONAN/SWL1536/2019 CNIC-1909 (H1N1) ANTIGEN (FORMALDEHYDE INACTIVATED), INFLUENZA A VIRUS A/HONG KONG/2671/2019 (H3N2) ANTIGEN (FORMALDEHYDE INACTIVATED), INFLUENZA B VIRUS B/PHUKET/3073/2013 ANTIGEN (FORMALDEHYDE INACTIVATED), AND INFLUENZA B VIRUS B/WASHINGTON/02/2019 ANTIGEN (FORMALDEHYDE INACTIVATED) 0.5 ML: 15; 15; 15; 15 INJECTION, SUSPENSION INTRAMUSCULAR at 05:54

## 2020-10-17 ASSESSMENT — PATIENT HEALTH QUESTIONNAIRE - PHQ9
SUM OF ALL RESPONSES TO PHQ QUESTIONS 1-9: 2
6. FEELING BAD ABOUT YOURSELF - OR THAT YOU ARE A FAILURE OR HAVE LET YOURSELF OR YOUR FAMILY DOWN: NOT AL ALL
5. POOR APPETITE OR OVEREATING: NOT AT ALL
3. TROUBLE FALLING OR STAYING ASLEEP OR SLEEPING TOO MUCH: NOT AT ALL
8. MOVING OR SPEAKING SO SLOWLY THAT OTHER PEOPLE COULD HAVE NOTICED. OR THE OPPOSITE, BEING SO FIGETY OR RESTLESS THAT YOU HAVE BEEN MOVING AROUND A LOT MORE THAN USUAL: NOT AT ALL
SUM OF ALL RESPONSES TO PHQ9 QUESTIONS 1 AND 2: 1
4. FEELING TIRED OR HAVING LITTLE ENERGY: NOT AT ALL
1. LITTLE INTEREST OR PLEASURE IN DOING THINGS: NOT AT ALL
7. TROUBLE CONCENTRATING ON THINGS, SUCH AS READING THE NEWSPAPER OR WATCHING TELEVISION: SEVERAL DAYS
2. FEELING DOWN, DEPRESSED, IRRITABLE, OR HOPELESS: SEVERAL DAYS
9. THOUGHTS THAT YOU WOULD BE BETTER OFF DEAD, OR OF HURTING YOURSELF: NOT AT ALL

## 2020-10-17 ASSESSMENT — COGNITIVE AND FUNCTIONAL STATUS - GENERAL
TURNING FROM BACK TO SIDE WHILE IN FLAT BAD: A LITTLE
SUGGESTED CMS G CODE MODIFIER DAILY ACTIVITY: CK
SUGGESTED CMS G CODE MODIFIER MOBILITY: CM
DAILY ACTIVITIY SCORE: 15
HELP NEEDED FOR BATHING: A LOT
DRESSING REGULAR UPPER BODY CLOTHING: A LOT
MOBILITY SCORE: 9
DRESSING REGULAR LOWER BODY CLOTHING: A LOT
MOVING TO AND FROM BED TO CHAIR: A LOT
TOILETING: A LITTLE
WALKING IN HOSPITAL ROOM: TOTAL
EATING MEALS: A LITTLE
CLIMB 3 TO 5 STEPS WITH RAILING: TOTAL
MOVING FROM LYING ON BACK TO SITTING ON SIDE OF FLAT BED: UNABLE
STANDING UP FROM CHAIR USING ARMS: TOTAL
PERSONAL GROOMING: A LITTLE

## 2020-10-17 ASSESSMENT — ENCOUNTER SYMPTOMS
SENSORY CHANGE: 1
SINUS PAIN: 0
SPUTUM PRODUCTION: 0
CONSTIPATION: 0
VOMITING: 0
NERVOUS/ANXIOUS: 0
PALPITATIONS: 0
WHEEZING: 0
DIZZINESS: 1
COUGH: 0
ABDOMINAL PAIN: 0
WEAKNESS: 1
HEADACHES: 1
SORE THROAT: 0
CHILLS: 0
FOCAL WEAKNESS: 0
NAUSEA: 1
MYALGIAS: 0
DOUBLE VISION: 0
DIARRHEA: 0
SHORTNESS OF BREATH: 0
FEVER: 0
BLURRED VISION: 0
SPEECH CHANGE: 1

## 2020-10-17 ASSESSMENT — LIFESTYLE VARIABLES
TOTAL SCORE: 0
HAVE YOU EVER FELT YOU SHOULD CUT DOWN ON YOUR DRINKING: NO
CONSUMPTION TOTAL: INCOMPLETE
ALCOHOL_USE: YES
DOES PATIENT WANT TO STOP DRINKING: NO
TOTAL SCORE: 0
ON A TYPICAL DAY WHEN YOU DRINK ALCOHOL HOW MANY DRINKS DO YOU HAVE: 4
TOTAL SCORE: 0
EVER FELT BAD OR GUILTY ABOUT YOUR DRINKING: NO
EVER HAD A DRINK FIRST THING IN THE MORNING TO STEADY YOUR NERVES TO GET RID OF A HANGOVER: NO
AVERAGE NUMBER OF DAYS PER WEEK YOU HAVE A DRINK CONTAINING ALCOHOL: 4
HAVE PEOPLE ANNOYED YOU BY CRITICIZING YOUR DRINKING: NO

## 2020-10-17 ASSESSMENT — FIBROSIS 4 INDEX
FIB4 SCORE: 0.66
FIB4 SCORE: 0.66

## 2020-10-17 ASSESSMENT — PAIN DESCRIPTION - PAIN TYPE
TYPE: ACUTE PAIN
TYPE: ACUTE PAIN

## 2020-10-17 NOTE — PROGRESS NOTES
2 RN skin check with April     -Bilateral feet dry, calloused, flaky  -Hyperpigmentation to RLQ of abdomen  -Skin otherwise warm, pink, dry, intact, and blanching

## 2020-10-17 NOTE — PROGRESS NOTES
[facility] TRIAGE [facility]ICER ER REPORT  Consult/Admission requested by: Dr Yoder   Chief complaint: weakness  Pertinent history/ER Course:   Came with weakness and possible stroke, possible conversion disorder, will be admitted for stroke work up, neurology on board.   Patient meets admission criterion: Yes..  Recommendations given or work up & consultations requested per triage officer: none   Consultants involved and pertinent input from consultants: neuro  Admission status: Observation.   Admission order placed: To be placed by admitting physician.   Floor requested: neurology   Assigned hospitalist: Dr Montana

## 2020-10-17 NOTE — ASSESSMENT & PLAN NOTE
Recurrent.  Patient has history of conversion.   Improving slowly every day  On ASA, statin.  CT head and MRI brain no acute  Neurology signed off  Psychiatry recommend referral back to [facility] for outpatient psychiatric treatment  Fall precaution  PT/OT recommend SNF  Likely back to [facility]

## 2020-10-17 NOTE — ED TRIAGE NOTES
"Chief Complaint   Patient presents with   • Possible Stroke     BIB EMS FROM [facility] FOR SLOW SLURRED SPEECH AND RIGHT SIDED WEAKNESS STARTING 1845.       /68   Pulse 76   Resp 16   Ht 1.981 m (6' 6\")   Wt (!) 129 kg (284 lb 6.3 oz)   SpO2 96%   BMI 32.86 kg/m²   BLOOD SUGAR 200 PER EMS  "

## 2020-10-17 NOTE — CARE PLAN
Problem: Knowledge Deficit  Goal: Knowledge of disease process/condition, treatment plan, diagnostic tests, and medications will improve  Outcome: PROGRESSING AS EXPECTED  Note: Patient admitted for stroke work-up. Plan of care discussed; questions and concerns regarding pending therapy evaluations addressed. Stroke education and guide provided. Patient expressing understanding.      Problem: Communication:  Goal: The ability to communicate needs accurately and effectively will improve  Outcome: PROGRESSING AS EXPECTED  Note: Patient for admitted for stroke work-up. Patient A&Ox4, able to make needs known and using call light appropriately for assistance. Call light within reach; hourly rounding in place.

## 2020-10-17 NOTE — ED NOTES
Care assumed. Pt resting in bed. Continues to have right side weakness and stuttered speech. Dr. Mccoy has been to bedside.

## 2020-10-17 NOTE — CONSULTS
"Neurology STROKE CODE H&P  Neurohospitalist Service, [facility]    Referring Physician: Lulu Yoder D.O.    STROKE CODE:   Chief Complaint   Patient presents with   • Possible Stroke     BIB EMS FROM residential FOR SLOW SLURRED SPEECH AND RIGHT SIDED WEAKNESS STARTING 1845.       To obtain the most accurate data regarding the time called, and time patient seen, refer to the stroke run-sheet and chart.  For time of CT, refer to the radiology report. See A&P below for TPA Decision and door to needle time if and when applicable.    HPI: Norm Prabhakar is a 40 y.o. man with detailed psychiatric history presenting for whom neurology has been consulted as a code stroke.  As documented by Dr. Lulu Yoder 10/16/20, \"40 y.o. male with a complex medical history including seizures and type II diabetes who presents to the emergency Department as a Stroke Activation with acute slurred speech and right upper and lower extremity weakness last seen normal 18:45. Patient was brought in by from [facility] after he had noticed difficulty speaking with weakness to his right arm and leg. There are no known alleviating or exacerbating factors.\"  Patient was seen by Dr. Daniela Franco in neurology out patient clinic 5/7/20 and documented, \"Norm Prabhakar 38 y.o. male presents today for intermittent right sided weakness follow up.. during the middle of the intake his  slips me a paper stating 'he lies compulsively'.\"  Patient currently, 10/16/20 2045 complains of right sided weakness; acute on chronic.  Denies changes in vision nor speech.  Admits a headache for the past two days.  He took Tylenol but it did not alleviate symptoms.  It is located frontally, moderate in intensity, and throbbing in quality.  It is constant.  Associated with nausea and mild photophobia.    Review of systems: In addition to what is detailed in the HPI above, all other systems reviewed and are negative.    Past Medical " "History:    has a past medical history of Anxiety, ASTHMA, Bipolar 1 disorder ([facility]), Depression, Diabetes ([facility]), Fall, Glaucoma, Glaucoma (1982), Hypothyroidism, Indigestion, Mental disorder, Murmur, Pneumonia, Psychiatric problem (2002), S/P thyroidectomy, Seizure ([facility]) (2010), Seizure disorder ([facility]), and Unspecified disorder of thyroid.    FHx:  family history includes Heart Disease in his mother; Hypertension in his mother; Lung Disease in his mother; Stroke in his maternal grandmother.    SHx:   reports that he quit smoking about 4 months ago. His smoking use included cigarettes and cigars. He smoked 0.25 packs per day. He has never used smokeless tobacco. He reports current alcohol use. He reports previous drug use. Drug: Inhaled.    Allergies:  Allergies   Allergen Reactions   • Abilify Unspecified     \"Feeling tired, like I don't even know whats going on around me\"   • Fish      Pt reports fish causes him to be sick to his stomach     • Geodon [Ziprasidone Hcl]        Medications:    Current Facility-Administered Medications:   •  [COMPLETED] iohexol (OMNIPAQUE) 350 mg/mL, 80 mL, Intravenous, Once, JOB Vann.O., 80 mL at 10/16/20 2100  •  [COMPLETED] iohexol (OMNIPAQUE) 350 mg/mL, 40 mL, Intravenous, Once, JOB Vann.O., 40 mL at 10/16/20 2100    Current Outpatient Medications:   •  divalproex (DEPAKOTE) 500 MG Tablet Delayed Response, Take 2 Tabs by mouth every morning., Disp: 30 Tab, Rfl: 0  •  divalproex (DEPAKOTE) 500 MG Tablet Delayed Response, Take 3 Tabs by mouth every evening., Disp: 60 Tab, Rfl: 0  •  topiramate (TOPAMAX) 25 MG Tab, Take 1 Tab by mouth 2 times a day., Disp: 60 Tab, Rfl: 0  •  levothyroxine (SYNTHROID) 25 MCG Tab, Take 25 mcg by mouth Every morning on an empty stomach. Pt takes 200 mcg and 25 mcg for a total dose of 225 mcg every morning, Disp: , Rfl:   •  albuterol 108 (90 Base) MCG/ACT Aero Soln inhalation aerosol, Inhale 2 Puffs by mouth every 6 hours as needed for " Shortness of Breath., Disp: , Rfl:   •  budesonide-formoterol (SYMBICORT) 160-4.5 MCG/ACT Aerosol, Inhale 2 Puffs by mouth 2 Times a Day., Disp: , Rfl:   •  famotidine (PEPCID) 10 MG tablet, Take 10 mg by mouth 2 times a day., Disp: , Rfl:   •  levothyroxine (SYNTHROID) 200 MCG Tab, Take 200 mcg by mouth Every morning on an empty stomach. Pt takes 200 mcg and 25 mcg for a total dose of 225 mcg every morning, Disp: , Rfl:   •  ALPRAZolam (XANAX) 0.25 MG Tab, Take 0.25 mg by mouth 1 time daily as needed., Disp: , Rfl:   •  traZODone (DESYREL) 100 MG Tab, Take 100 mg by mouth at bedtime as needed., Disp: , Rfl:   •  LATUDA 20 MG Tab, Take 20 mg by mouth every bedtime., Disp: , Rfl:   •  hydrOXYzine HCl (ATARAX) 25 MG Tab, Take 25 mg by mouth 2 times a day as needed for Anxiety., Disp: , Rfl:   •  Cholecalciferol (VITAMIN D) 2000 UNIT Tab, Take 4,000 Units by mouth every bedtime. 2 tablets = 4000 units, Disp: , Rfl:   •  sertraline (ZOLOFT) 100 MG Tab, Take 100 mg by mouth every day., Disp: , Rfl:   •  fenofibrate (ANTARA) 130 MG capsule, Take 130 mg by mouth every evening., Disp: , Rfl:   •  Empagliflozin (JARDIANCE) 25 MG Tab, Take 25 mg by mouth every day., Disp: , Rfl:   •  lisinopril (PRINIVIL) 10 MG Tab, Take 10 mg by mouth every day., Disp: , Rfl:   •  montelukast (SINGULAIR) 10 MG Tab, Take 10 mg by mouth every evening., Disp: , Rfl:   •  aspirin EC (ECOTRIN) 81 MG Tablet Delayed Response, Take 81 mg by mouth every morning., Disp: , Rfl:   •  atorvastatin (LIPITOR) 80 MG tablet, Take 80 mg by mouth every evening., Disp: , Rfl:   •  insulin glargine (BASAGLAR KWIKPEN) 100 UNIT/ML Solution Pen-injector injection, Inject 15-20 Units as instructed every evening. 15 units every morning 20 units every night, Disp: , Rfl:   •  busPIRone (BUSPAR) 10 MG Tab tablet, Take 10 mg by mouth 3 times a day., Disp: , Rfl:   •  glimepiride (AMARYL) 4 MG Tab, Take 4 mg by mouth every morning., Disp: , Rfl:   •  metformin  "(GLUCOPHAGE) 1000 MG tablet, Take 1,000 mg by mouth 2 times a day., Disp: , Rfl:   •  prazosin (MINIPRESS) 2 MG Cap, Take 4 mg by mouth every evening. 2 capsules = 4 mg, Disp: , Rfl:     Physical Examination:    Vitals:    10/16/20 2000 10/16/20 2038   BP:  126/68   Pulse:  76   Resp:  16   SpO2:  96%   Weight: (!) 129 kg (284 lb 6.3 oz)    Height: 1.981 m (6' 6\")        General: Patient is awake and in no acute distress; le belle indifférence   Eyes: examination of optic disks not indicated at this time given acuity of consult  CV: RRR    NEUROLOGICAL EXAM:     Mental status: Awake, alert oriented, follows commands  Speech and language: speech is dysarthric and stuttering. The patient is able to name and repeat.  Cranial nerve exam: blind OS, surgical pupil, reactive OD. Visual fields are full on blink to threat on the right. Extraocular muscles are intact. Sensation in the face is intact to light touch. Face is symmetric. Hearing to finger rub equal. Palate elevates symmetrically. Shoulder shrug is full. Tongue is midline.  Motor exam: right sided hemiplegia with normal tone.  + Romero sign RLE. No abnormal movements were seen on exam.  Sensory exam: No withdrawal from noxious stim RLE; decrease in sensation splits midline  Deep tendon reflexes: 2+ and symmetric. Toes mute bilaterally.  Coordination: no ataxia LUE; nonparticipatory on the right  Gait: deferred given patient preference    Objective Data:    Labs:  Lab Results   Component Value Date/Time    PROTHROMBTM 13.2 10/16/2020 08:16 PM    INR 0.97 10/16/2020 08:16 PM      Lab Results   Component Value Date/Time    WBC 8.5 10/16/2020 08:16 PM    RBC 4.16 (L) 10/16/2020 08:16 PM    HEMOGLOBIN 12.2 (L) 10/16/2020 08:16 PM    HEMATOCRIT 37.6 (L) 10/16/2020 08:16 PM    MCV 90.4 10/16/2020 08:16 PM    MCH 29.3 10/16/2020 08:16 PM    MCHC 32.4 (L) 10/16/2020 08:16 PM    MPV 10.0 10/16/2020 08:16 PM    NEUTSPOLYS 53.70 10/16/2020 08:16 PM    LYMPHOCYTES 34.00 " 10/16/2020 08:16 PM    MONOCYTES 9.00 10/16/2020 08:16 PM    EOSINOPHILS 1.20 10/16/2020 08:16 PM    BASOPHILS 0.70 10/16/2020 08:16 PM    ANISOCYTOSIS 1+ 06/20/2020 05:40 PM      Lab Results   Component Value Date/Time    SODIUM 138 10/05/2020 08:36 PM    POTASSIUM 4.0 10/05/2020 08:36 PM    CHLORIDE 102 10/05/2020 08:36 PM    CO2 22 10/05/2020 08:36 PM    GLUCOSE 171 (H) 10/05/2020 08:36 PM    BUN 22 10/05/2020 08:36 PM    CREATININE 1.01 10/05/2020 08:36 PM      Lab Results   Component Value Date/Time    CHOLSTRLTOT 247 (H) 06/21/2020 04:40 AM    LDL see below 06/21/2020 04:40 AM    HDL 27 (A) 06/21/2020 04:40 AM    TRIGLYCERIDE 1109 (H) 06/21/2020 04:40 AM       Lab Results   Component Value Date/Time    ALKPHOSPHAT 49 10/05/2020 08:36 PM    ASTSGOT 42 10/05/2020 08:36 PM    ALTSGPT 35 10/05/2020 08:36 PM    TBILIRUBIN 0.2 10/05/2020 08:36 PM        Imaging/Testing:    I interpreted and/or reviewed the patient's neuroimaging    CT-CEREBRAL PERFUSION ANALYSIS   Final Result      1.  Cerebral blood flow less than 30% likely representing completed infarct = 0 mL.      2.  T Max more than 6 seconds likely representing combination of completed infarct and ischemia = 0 mL.      3.  Mismatched volume likely representing ischemic brain/penumbra = None      4.  Please note that the cerebral perfusion was performed on the limited brain tissue around the basal ganglia region. Infarct/ischemia outside the CT perfusion sections can be missed in this study.      CT-CTA NECK WITH & W/O-POST PROCESSING   Final Result      No significant stenosis or dissection of the neck arteries      CT-CTA HEAD WITH & W/O-POST PROCESS   Final Result      CT angiogram of the [place] within normal limits.      CT-HEAD W/O   Final Result      1.  Stable severe nonspecific supratentorial white matter disease.   2.  No acute intracranial abnormality.      DX-CHEST-PORTABLE (1 VIEW)    (Results Pending)       Assessment and Plan:    Norm  Brandon Prabhakar is a 40 y.o. man, seen previously in out patient neurology clinic for similar chief complaint, presenting for whom neurology has been consulted for another episode of right sided weakness.  Symptoms are acute on chronic, thus patient is not a candidate for acute stroke intervention.  The patient's examination is suspicious for functional etiology; eg. Hoovers sign, weakness with normal tone, sensory deficit that splits midline, and CTP is negative for oligemia.  Mainstay of therapy should be targeted in an approach that incorporates psychiatry.  In out patient neurology note, CADASIL was invoked on the differential; this entity would surely demonstrate radiographic evidence of subcortical infarcts and leukoencephalopathy, thus I cannot support genetic testing with confidence.  Notably, patient is obese, has vascular risk factors, and has metabolic syndrome which predisposes him to stroke and other vascular insult; continue primary stroke prevention appropriate.  Also on ddx is complicated migraine given concomitant headache, thus will treat accordingly.    Plan:    - STAT CT/CTA/CTP  - headache cocktail x1: 2g IV Magnesium, 10mg IV Reglan, 25mg IV Benadryl  - psychiatry consultation and social work  - continue ASA for primary prevention  - aggressively treat underlying vascular risk factors with lifestyle modification  - serum A1c and lipid panel  - Mediterranean Diet  - continued follow up in out patient neurology clinic    if weakness does not improve after headache cocktail as detailed above, consider MR imaging of brain and cspine    The evaluation of the patient, and recommended management, was discussed with Dr. Lulu Yoder.    Moo Mccoy MD  Neurohospitalist, Acute Care Services   of Neurology

## 2020-10-17 NOTE — ED PROVIDER NOTES
Scribed for Lulu Yoder D.O. by Chuck Dawson. 10/16/2020, 8:14 PM.     Primary care provider: ANIKET Davis  Means of arrival: EMS         History obtained from: Patient/EMS  History limited by: None    CHIEF COMPLAINT  Chief Complaint   Patient presents with   • Possible Stroke     BIB EMS FROM [facility] FOR SLOW SLURRED SPEECH AND RIGHT SIDED WEAKNESS STARTING 1845.       HPI  Norm Prabhakar is a 40 y.o. male with a complex medical history including seizures and type II diabetes who presents to the emergency Department as a Stroke Activation with acute slurred speech and right upper and lower extremity weakness last seen normal 18:45. Patient was brought in from [facility] after he had noticed difficulty speaking with weakness to his right arm and leg. There are no known alleviating or exacerbating factors. He denies any associated vision changed, chest pain, or fever. Patient takes several medications as noted below including baby aspirin. He was seen here earlier this year for similar issue and had a negative stroke workup at that time.    REVIEW OF SYSTEMS  Pertinent positives include slurred speech and right sided weakness. Pertinent negatives include no vision changes, chest pain, or fever.   See HPI for further details. All other systems are negative.    PAST MEDICAL HISTORY  Past Medical History:   Diagnosis Date   • Anxiety     BIPOLAR   • ASTHMA    • Bipolar 1 disorder (HCC)    • Depression    • Diabetes ([facility])     Type II Diabetes   • Fall     passed out 2 wks ago   • Glaucoma    • Glaucoma 1982    both eyes/ blind on left eye   • Hypothyroidism    • Indigestion     once in a while   • Mental disorder     learning disabilities; speech impairment; developmental delays   • Murmur     since birth   • Pneumonia     remote   • Psychiatric problem 2002    PTSD   • S/P thyroidectomy    • Seizure (HCC) 2010   • Seizure disorder (HCC)    • Unspecified disorder of thyroid        FAMILY  HISTORY  Family History   Problem Relation Age of Onset   • Hypertension Mother    • Heart Disease Mother    • Lung Disease Mother    • Stroke Maternal Grandmother        SOCIAL HISTORY  Social History     Tobacco Use   • Smoking status: Former Smoker     Packs/day: 0.25     Types: Cigarettes, Cigars     Quit date: 2020     Years since quittin.3   • Smokeless tobacco: Never Used   Substance Use Topics   • Alcohol use: Yes     Frequency: 2-3 times a week   • Drug use: Not Currently     Types: Inhaled     Comment: marijuana      Social History     Substance and Sexual Activity   Drug Use Not Currently   • Types: Inhaled    Comment: marijuana       SURGICAL HISTORY  Past Surgical History:   Procedure Laterality Date   • EYE SURGERY     • OTHER      Hernia Repair when he was 10 yrs old   • THYROID LOBECTOMY         CURRENT MEDICATIONS  Current Outpatient Medications   Medication Instructions   • albuterol 108 (90 Base) MCG/ACT Aero Soln inhalation aerosol 2 Puffs, Inhalation, EVERY 6 HOURS PRN   • ALPRAZolam (XANAX) 0.25 mg, Oral, 1 TIME DAILY PRN   • aspirin EC (ECOTRIN) 81 mg, Oral, EVERY MORNING   • atorvastatin (LIPITOR) 80 mg, Oral, EVERY EVENING   • budesonide-formoterol (SYMBICORT) 160-4.5 MCG/ACT Aerosol 2 Puffs, Inhalation, 2 TIMES DAILY   • busPIRone (BUSPAR) 10 mg, Oral, 3 TIMES DAILY   • divalproex (DEPAKOTE) 1,000 mg, Oral, EVERY MORNING   • divalproex (DEPAKOTE) 1,500 mg, Oral, EVERY EVENING   • Empagliflozin (JARDIANCE) 25 mg, Oral, DAILY   • famotidine (PEPCID) 10 mg, Oral, 2 TIMES DAILY   • fenofibrate (ANTARA) 130 mg, Oral, EVERY EVENING   • glimepiride (AMARYL) 4 mg, Oral, EVERY MORNING   • hydrOXYzine HCl (ATARAX) 25 mg, Oral, 2 TIMES DAILY PRN   • insulin glargine (INSULIN GLARGINE) 15-20 Units, Subcutaneous, EVERY EVENING, 15 units every morning<BR>20 units every night   • Latuda 20 mg, Oral, EVERY BEDTIME   • levothyroxine (SYNTHROID) 200 mcg, Oral, EACH MORNING ON EMPTY STOMACH, Pt takes  "200 mcg and 25 mcg for a total dose of 225 mcg every morning   • levothyroxine (SYNTHROID) 25 mcg, Oral, EACH MORNING ON EMPTY STOMACH, Pt takes 200 mcg and 25 mcg for a total dose of 225 mcg every morning   • lisinopril (PRINIVIL) 10 mg, Oral, DAILY   • metformin (GLUCOPHAGE) 1,000 mg, Oral, 2 TIMES DAILY   • montelukast (SINGULAIR) 10 mg, Oral, EVERY EVENING   • prazosin (MINIPRESS) 4 mg, Oral, EVERY EVENING, 2 capsules = 4 mg   • sertraline (ZOLOFT) 100 mg, Oral, DAILY   • topiramate (TOPAMAX) 25 mg, Oral, 2 TIMES DAILY   • traZODone (DESYREL) 100 mg, Oral, NIGHTLY PRN   • vitamin D 4,000 Units, Oral, EVERY BEDTIME, 2 tablets = 4000 units       ALLERGIES  Allergies   Allergen Reactions   • Abilify Unspecified     \"Feeling tired, like I don't even know whats going on around me\"   • Fish      Pt reports fish causes him to be sick to his stomach     • Geodon [Ziprasidone Hcl]        PHYSICAL EXAM  VITAL SIGNS: /68   Pulse 76   Resp 16   Ht 1.981 m (6' 6\")   Wt (!) 129 kg (284 lb 6.3 oz)   SpO2 96%   BMI 32.86 kg/m²     Constitutional: Patient is a morbidly obese male in mild distress with stuttering slurred speech and inability to move his right arm or leg.  HENT: Normocephalic, atraumatic. Nose normal with no drainage. Oropharynx moist.  Eyes: PERRL, EOMI   Neck: Supple with Normal range of motion , No tenderness along the bony prominences.  Lymphatic: No lymphadenopathy noted.   Cardiovascular: Normal heart rate and Regular rhythm. No murmur  Thorax & Lungs: Clear and equal breath sounds with good excursion. No respiratory distress, no rhonchi, wheezing or rales.  Abdomen: Bowel sounds normal in all four quadrants. Soft, morbidly obese, nontender, no rebound , guarding, palpable masses.   Skin: Warm, Dry, No erythema   Back: No cervical, thoracic, or lumbosacral tenderness.    Extremities: Peripheral pulses 4/4 No edema, No tenderness   Neurologic: Alert & oriented, slurred/stuttering speech pattern, " no noted facial drooping, right upper and lower extremity weakness with sensory deficits, DTR's 4/4 bilaterally.  Psychiatric: Very odd affect, judgment impaired secondary to psychiatric causes.    DIAGNOSTICS/PROCEDURES    LABS  Results for orders placed or performed during the hospital encounter of 10/16/20   CBC WITH DIFFERENTIAL   Result Value Ref Range    WBC 8.5 4.8 - 10.8 K/uL    RBC 4.16 (L) 4.70 - 6.10 M/uL    Hemoglobin 12.2 (L) 14.0 - 18.0 g/dL    Hematocrit 37.6 (L) 42.0 - 52.0 %    MCV 90.4 81.4 - 97.8 fL    MCH 29.3 27.0 - 33.0 pg    MCHC 32.4 (L) 33.7 - 35.3 g/dL    RDW 48.6 35.9 - 50.0 fL    Platelet Count 273 164 - 446 K/uL    MPV 10.0 9.0 - 12.9 fL    Neutrophils-Polys 53.70 44.00 - 72.00 %    Lymphocytes 34.00 22.00 - 41.00 %    Monocytes 9.00 0.00 - 13.40 %    Eosinophils 1.20 0.00 - 6.90 %    Basophils 0.70 0.00 - 1.80 %    Immature Granulocytes 1.40 (H) 0.00 - 0.90 %    Nucleated RBC 0.00 /100 WBC    Neutrophils (Absolute) 4.56 1.82 - 7.42 K/uL    Lymphs (Absolute) 2.89 1.00 - 4.80 K/uL    Monos (Absolute) 0.76 0.00 - 0.85 K/uL    Eos (Absolute) 0.10 0.00 - 0.51 K/uL    Baso (Absolute) 0.06 0.00 - 0.12 K/uL    Immature Granulocytes (abs) 0.12 (H) 0.00 - 0.11 K/uL    NRBC (Absolute) 0.00 K/uL   COMP METABOLIC PANEL   Result Value Ref Range    Sodium 133 (L) 135 - 145 mmol/L    Potassium 4.0 3.6 - 5.5 mmol/L    Chloride 97 96 - 112 mmol/L    Co2 21 20 - 33 mmol/L    Anion Gap 15.0 7.0 - 16.0    Glucose 191 (H) 65 - 99 mg/dL    Bun 30 (H) 8 - 22 mg/dL    Creatinine 1.01 0.50 - 1.40 mg/dL    Calcium 9.2 8.5 - 10.5 mg/dL    AST(SGOT) 25 12 - 45 U/L    ALT(SGPT) 28 2 - 50 U/L    Alkaline Phosphatase 50 30 - 99 U/L    Total Bilirubin <0.2 0.1 - 1.5 mg/dL    Albumin 3.9 3.2 - 4.9 g/dL    Total Protein 6.0 6.0 - 8.2 g/dL    Globulin 2.1 1.9 - 3.5 g/dL    A-G Ratio 1.9 g/dL   PROTHROMBIN TIME   Result Value Ref Range    PT 13.2 12.0 - 14.6 sec    INR 0.97 0.87 - 1.13   APTT   Result Value Ref Range     APTT 27.0 24.7 - 36.0 sec   TROPONIN   Result Value Ref Range    Troponin T 18 6 - 19 ng/L   ESTIMATED GFR   Result Value Ref Range    GFR If African American >60 >60 mL/min/1.73 m 2    GFR If Non African American >60 >60 mL/min/1.73 m 2     Labs reviewed by me    EKG  Results for orders placed or performed during the hospital encounter of 10/05/20   EKG (Now)   Result Value Ref Range    Report       [facility] Emergency Dept.    Test Date:  2020-10-05  Pt Name:    HOLLY KIM                 Department: ER  MRN:        [id number]                      Room:       [facility]  Gender:     Male                         Technician: [id number]  :        1980                   Requested By:CORINNE LANDRY  Order #:    [id number]                    Reading MD: CORINNE LANDRY MD    Measurements  Intervals                                Axis  Rate:       63                           P:          42  SD:         172                          QRS:        4  QRSD:       118                          T:          20  QT:         380  QTc:        389    Interpretive Statements  SINUS RHYTHM  NONSPECIFIC INTRAVENTRICULAR CONDUCTION DELAY  Compared to ECG 2020 14:15:29  No significant changes  Electronically Signed On 10-5-2020 22:39:35 PDT by CORINNE LANDRY MD         RADIOLOGY/PROCEDURES  CT-CEREBRAL PERFUSION ANALYSIS   Final Result      1.  Cerebral blood flow less than 30% likely representing completed infarct = 0 mL.      2.  T Max more than 6 seconds likely representing combination of completed infarct and ischemia = 0 mL.      3.  Mismatched volume likely representing ischemic brain/penumbra = None      4.  Please note that the cerebral perfusion was performed on the limited brain tissue around the basal ganglia region. Infarct/ischemia outside the CT perfusion sections can be missed in this study.      CT-CTA NECK WITH & W/O-POST PROCESSING   Final Result      No significant stenosis or dissection of  the neck arteries      CT-CTA HEAD WITH & W/O-POST PROCESS   Final Result      CT angiogram of the [place] within normal limits.      CT-HEAD W/O   Final Result      1.  Stable severe nonspecific supratentorial white matter disease.   2.  No acute intracranial abnormality.      DX-CHEST-PORTABLE (1 VIEW)    (Results Pending)     Results and radiologist interpretation reviewed by me.     COURSE & MEDICAL DECISION MAKING  Pertinent Labs & Imaging studies reviewed. (See chart for details)    8:14 PM - Patient seen and evaluated at bedside. Ordered for DX-chest, CT-head w/o, CT-cerebral perfusion analysis, CT-CTA head w/ and w/o, CT-CTA neck w/ and w/o, CBC w/ diff, CMP, PTT, APTT, COD, EKG, and troponin to evaluate. Differential diagnoses include, but are not limited to acute CVA, TIA, psych    Patient's laboratories were unremarkable.  He has a normal white blood cell count with a stable H&H and no left shift.  CMP is negative other than a slightly elevated BUN of 30 indicating some mild dehydration.  Glucose is 191.  PT/INR within normal limits.  Troponin is 18.  Twelve-lead EKG shows normal sinus rhythm at a rate of 63 bpm with no acute ST elevation or depression, no ventricular ectopy, no A-V dissociation or QT prolongation.  There is nonspecific interventricular conduction delay.  CTA of the head and neck were performed and were found to be negative for any intracranial abnormality or thrombosis.  I spoke with neurology immediately upon the patient's arrival and he will be in to evaluate the patient.    8:20 PM - I discussed the patient's case and the above findings with Dr. Mccoy (Neuro) who does not recommend TPA and agrees to consult on the patient.   At this time the recommendations are for observation for admission until patient's symptoms have improved and he will follow-up with outpatient neurology.    9:21 PM - I discussed the patient's case and the above findings with Dr. Schumacher (Hospitalist)  who agrees to evaluate the patient for hospitalization.     DISPOSITION:  Patient will be hospitalized by Dr. Schumacher in guarded condition.    FINAL IMPRESSION  1. Acute right-sided weakness    2. History of psychiatric disorder    3. Essential hypertension    4. History of type 2 diabetes mellitus    5. Morbid obesity (HCC)         Chuck ROB (Scribe), am scribing for, and in the presence of, Lulu Yoder D.O..    Electronically signed by: Chuck Dawson (Scribe), 10/16/2020    Lulu ROB D.O. personally performed the services described in this documentation, as scribed by Chuck Dawson in my presence, and it is both accurate and complete. C.    The note accurately reflects work and decisions made by me.  Lulu Yoder D.O.  10/17/2020  12:07 AM

## 2020-10-17 NOTE — CARE PLAN
Problem: Communication  Goal: The ability to communicate needs accurately and effectively will improve  Outcome: PROGRESSING AS EXPECTED     Problem: Safety  Goal: Will remain free from injury  Outcome: PROGRESSING AS EXPECTED     Problem: Safety  Goal: Will remain free from injury  Outcome: PROGRESSING AS EXPECTED

## 2020-10-17 NOTE — CONSULTS
"PSYCHIATRY CONSULT NOTE: Patient not seen. Consult received for \"please evaluate for functional neurological deficits.\"     Chart reviewed. Patient was last seen by the IP psychiatry team on 3/1/2020 during which it was determined he was likely experiencing functional neurological symptoms. Since that time, he has been referred to IP psychiatry again on 6/21/2020 (not seen that date) and seen by behavioral health in the ER two times in September 2020. It was noted on 6/21/2020 (again not seen that date but chart was reviewed) that the patient should follow up with outpatient psychotherapy and psychiatry as functional neurological disorder is best treated in an outpatient setting. At that time, the consult was canceled. Per emergency room notes on 9/27/2020 and 9/29/2020, the patient was referred again to his outpatient psychiatric services at [facility].    The patient still lives at [facility] and is actively followed by a  per the H&P. It is unclear what the patient's current acute psychiatric needs are (e.g. SI/HI, psychosis, aggression, anxiety or depression preventing the patient from engaging in treatment in the hospital, etc.) As has been noted before, the treatment for functional neurological disorder is outpatient psychiatry and psychotherapy.    Consult is being canceled for now. J Carlos texted referring physician with this information. Functional deficits have already been established in this patient during previous visits. He should be referred back to [facility] to continue outpatient psychiatric treatment once medically cleared.     Please feel free to re-consult if an acute psychiatric need is identified. Thank you.         "

## 2020-10-17 NOTE — THERAPY
"Speech Language Pathology   Initial Assessment     Patient Name: Norm Prabhakar  AGE:  40 y.o., SEX:  male  Medical Record #: [id number]  Today's Date: 10/17/2020     Assessment  Patient is 40 y.o. male admitted 10/16 with possible stroke. BIB EMS FROM [facility] FOR SLOW SLURRED SPEECH AND RIGHT SIDED WEAKNESS STARTING 1845. PMHx: type 2 diabetes, seizure disorder, conversation disorder with recurrent R sided weakness. Per notes, pt lives at [facility]. Pt has been admitted 3 times this year for similar work ups with no stroke or intracranial abnormality detected. Chest x-ray does not report concern for aspiration. Notes indicate that per pt's , the patient compulsively lies. SLP recs during pt's admit in June 2020 were for regular/thin liquids with mention that the pt was inconsistent with R sided weakness and his reported deficits. Also, pt was adamant in showing picture of his \"fiancee\" to this SLP. He reported that she lives in [place] and they are getting  next month under the [employer].     Clinical swallow evaluation completed this date. Oral mechanism exam completed, with suspicious decreased ROM of tongue and weakness of lips. However during trials, orofacial structures appeared WFL with appropriate ROM and strength.Of note, pt had consistent initial single sound repetitions during conversation. Pt reported that this started when his R sided weakness started. No secondary behaviors noted. 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 Pt consumed PO of single ice chips, MTL, liquidized textures, purees, minced&moist, soft&bite size, regular textures, and thin liquids. Hyolaryngeal elevation palpated as complete upon palpation with no overt s/sx concerning for aspiration across PO. Swallow appears functional for a regular/thin liquid diet, however following SLP recommendations pt requested that his food is softer and \"cut up.\" Given this request, will recommend SB6/TNO. SLP to follow 1-2 x " during pt's acute care stay.     Plan  1. Initiate SB6/TN0    Recommend Speech Therapy 2 times per week until therapy goals are met for the following treatments:  Dysphagia Training.       Objective     10/17/20 0858   Oral Motor Eval    Is Patient Able to Complete Oral Motor Eval Yes but Impaired   Labial Function   Labial Structure At Rest Minimal   Labial Vowel Production / I /, / U / Minimal   Lingual Function   Lingual Protrude Minimal   Lingual Retract Minimal   Elevate In Mouth Minimal   Elevate Outside Mouth Minimal   Lateralization Minimal Right;Minimal Left   / Pa / 5X's Moderate   / Ta / 5X's Moderate   / Ka / 5X's Moderate   / [place] / 5X's Moderate   Laryngeal Function   Excursion Upon Swallow Complete   Dysphagia Strategies / Recommendations   Compensatory Strategies Head of Bed 90 Degrees During Eating / Drinking;Single Sips / Bites   Diet / Liquid Recommendation Soft & Bite-Sized (6) - (Dysphagia III);Thin (0)   Medication Administration  Whole with Liquid Wash   Therapy Interventions Dysphagia Therapy By Speech Language Pathologist   Short Term Goals   Short Term Goal # 1 Pt will consume SB6/TN0 diet with no overt s/sx concerning for penetration/aspiration.

## 2020-10-17 NOTE — PROGRESS NOTES
Hospital Medicine Daily Progress Note    Date of Service  10/17/2020    Chief Complaint  Headache and recurrent right-sided weakness     Hospital Course  40 y.o. male with Medical history of type 2 diabetes mellitus, seizure disorder, conversion disorder admitted 10/16/2020 with headache and recurrent right-sided weakness in his upper and lower extremities.  Patient is a current resident of [facility].  Patient has multiple admissions this year for similar symptoms and work-up done she will no CVA or no intracranial abnormality.  Neurology consulted recommended IV Benadryl as part of the headache cocktail.  CT head, CTA head and neck, CT cerebral perfusion and MRI brain showed no acute abnormality.   Psychiatry  consulted , patient was seen by Sarah several times the past, with numerous recommendations for  psychotherapy/CBT for Conversion disorder. Psychiatry recommends referral back to [facility] for further outpatient psychiatric treatment.  Psychiatry and neurology signed off.  Pending PT/OT evaluation.     Interval Problem Update  Patient stated that the headache is improving.  Still having significant weakness in his right-sided upper and lower extremities.  He lives on the second floor at [facility], patient said it would be very difficult for him to go back to [facility] with significant weakness.   Needs PT/OT evaluation.     Consultants/Specialty  Neurology  Psychiatry    Code Status  Full Code    Disposition  Inpatient, pending PT/OT evaluation    Review of Systems  Review of Systems   Constitutional: Positive for malaise/fatigue. Negative for chills and fever.   HENT: Negative for congestion, ear discharge, ear pain, sinus pain and sore throat.    Eyes: Negative for blurred vision and double vision.   Respiratory: Negative for cough, sputum production, shortness of breath and wheezing.    Cardiovascular: Negative for chest pain, palpitations and leg swelling.   Gastrointestinal: Positive for nausea. Negative  for abdominal pain, constipation, diarrhea and vomiting.   Genitourinary: Negative for dysuria, frequency and urgency.   Musculoskeletal: Negative for myalgias.   Neurological: Positive for dizziness, sensory change, speech change, weakness and headaches. Negative for focal weakness.   Psychiatric/Behavioral: The patient is not nervous/anxious.         Physical Exam  Temp:  [36.1 °C (96.9 °F)-36.7 °C (98 °F)] 36.3 °C (97.3 °F)  Pulse:  [56-91] 62  Resp:  [15-29] 16  BP: (102-139)/(63-77) 102/64  SpO2:  [92 %-96 %] 94 %    Physical Exam  Constitutional:       General: He is not in acute distress.     Appearance: He is obese.   HENT:      Head: Normocephalic and atraumatic.      Nose: Nose normal.      Mouth/Throat:      Mouth: Mucous membranes are moist.      Pharynx: No posterior oropharyngeal erythema.   Eyes:      General: No scleral icterus.     Extraocular Movements: Extraocular movements intact.      Conjunctiva/sclera: Conjunctivae normal.      Pupils: Pupils are equal, round, and reactive to light.   Neck:      Musculoskeletal: Normal range of motion and neck supple. No neck rigidity.   Cardiovascular:      Rate and Rhythm: Normal rate and regular rhythm.      Pulses: Normal pulses.      Heart sounds: Normal heart sounds. No murmur. No gallop.    Pulmonary:      Effort: Pulmonary effort is normal.      Breath sounds: Normal breath sounds. No stridor. No wheezing, rhonchi or rales.   Abdominal:      General: Bowel sounds are normal.      Palpations: Abdomen is soft.   Musculoskeletal:         General: No swelling or tenderness.   Skin:     General: Skin is warm.   Neurological:      Mental Status: He is alert.      Motor: Weakness present.      Comments: Motor 1/5 in right upper and lower extremities  Sensation slightly reduced in right upper and lower extremities   Psychiatric:         Mood and Affect: Affect is flat.         Speech: Speech is delayed.         Behavior: Behavior is slowed.         Cognition  and Memory: Memory is impaired.         Fluids    Intake/Output Summary (Last 24 hours) at 10/17/2020 1337  Last data filed at 10/17/2020 0700  Gross per 24 hour   Intake --   Output 2075 ml   Net -2075 ml       Laboratory  Recent Labs     10/16/20  2016   WBC 8.5   RBC 4.16*   HEMOGLOBIN 12.2*   HEMATOCRIT 37.6*   MCV 90.4   MCH 29.3   MCHC 32.4*   RDW 48.6   PLATELETCT 273   MPV 10.0     Recent Labs     10/16/20  2016   SODIUM 133*   POTASSIUM 4.0   CHLORIDE 97   CO2 21   GLUCOSE 191*   BUN 30*   CREATININE 1.01   CALCIUM 9.2     Recent Labs     10/16/20  2016   APTT 27.0   INR 0.97               Imaging  MR-BRAIN-W/O   Final Result      1.  No evidence of acute territorial infarct, intracranial hemorrhage or mass lesion.   2.  Mild diffuse cerebral substance loss.   3.  Redemonstrated diffuse confluent T2 and FLAIR signal hyperintensity in the subcortical and periventricular white matter of the bilateral cerebral hemispheres with unchanged differential consideration of dysmyelination or demyelination versus remote    toxic insult.      DX-CHEST-PORTABLE (1 VIEW)   Final Result      Hypoinflation without acute cardiopulmonary abnormality.      CT-CEREBRAL PERFUSION ANALYSIS   Final Result      1.  Cerebral blood flow less than 30% likely representing completed infarct = 0 mL.      2.  T Max more than 6 seconds likely representing combination of completed infarct and ischemia = 0 mL.      3.  Mismatched volume likely representing ischemic brain/penumbra = None      4.  Please note that the cerebral perfusion was performed on the limited brain tissue around the basal ganglia region. Infarct/ischemia outside the CT perfusion sections can be missed in this study.      CT-CTA NECK WITH & W/O-POST PROCESSING   Final Result      No significant stenosis or dissection of the neck arteries      CT-CTA HEAD WITH & W/O-POST PROCESS   Final Result      CT angiogram of the [place] within normal limits.      CT-HEAD W/O    Final Result      1.  Stable severe nonspecific supratentorial white matter disease.   2.  No acute intracranial abnormality.           Assessment/Plan  Acute right-sided weakness- (present on admission)  Assessment & Plan  Recurrent.  Patient has history of conversion.   On ASA, statin.  CT head and MRI brain no acute  Neurology signed off  Psychiatry recommend referral back to [facility] for outpatient psychiatric treatment  Pending PT/OT evaluation  Fall precaution    Seizure disorder (HCC)  Assessment & Plan  Continue home seizure medication  Fall/seizure/aspiration precaution    Type 2 diabetes mellitus with hyperglycemia, without long-term current use of insulin (HCC)- (present on admission)  Assessment & Plan  Poorly controlled with last A1c 9.5 in 6/20.  Hold oral glycemic agents.  Insulin sliding scale, Accu-Cheks, hypoglycemia protocol.    Headache- (present on admission)  Assessment & Plan  S/p Headache cocktail per neurology.   Improving    Psychiatric problem- (present on admission)  Assessment & Plan  Bipolar depression, PTSD.  Continue home medications.       VTE prophylaxis: Lovenox

## 2020-10-17 NOTE — H&P
Hospital Medicine History & Physical Note    Date of Service  10/16/2020    Primary Care Physician  ANIKET Davis    Consultants  Neurology    Code Status  Full Code    Chief Complaint  Chief Complaint   Patient presents with   • Possible Stroke     [facility] SLOW SLURRED SPEECH AND RIGHT SIDED WEAKNESS STARTING 1845.       History of Presenting Illness  40 y.o. male with multiple medical problems including type 2 diabetes, seizure disorder, conversion disorder, who presented 10/16/2020 with recurrent right sided weakness.   He lives at [facility] and was brought in for stroke work-up after he noticed difficulty speaking and weakness in his right arm and leg around 7 PM.  He has been admitted 3 times this year for similar work-ups and no stroke or intracranial abnormality was detected.  Reportedly, his  told the neurologist that the patient compulsively lies.    At the time I visited the patient for admission, he had been given IV benadryl as part of a headache cocktail and was quite somnolent, preventing history taking.      Review of Systems  Review of Systems   Unable to perform ROS: Mental status change       Past Medical History   has a past medical history of Anxiety, ASTHMA, Bipolar 1 disorder ([facility]), Depression, Diabetes (HCC), Fall, Glaucoma, Glaucoma (1982), Hypothyroidism, Indigestion, Mental disorder, Murmur, Pneumonia, Psychiatric problem (2002), S/P thyroidectomy, Seizure ([facility]) (2010), Seizure disorder ([facility]), and Unspecified disorder of thyroid.    Surgical History   has a past surgical history that includes eye surgery; thyroid lobectomy; and other.     Family History  family history includes Heart Disease in his mother; Hypertension in his mother; Lung Disease in his mother; Stroke in his maternal grandmother.     Social History   reports that he quit smoking about 4 months ago. His smoking use included cigarettes and cigars. He smoked 0.25 packs per day. He has  "never used smokeless tobacco. He reports current alcohol use. He reports previous drug use. Drug: Inhaled.    Allergies  Allergies   Allergen Reactions   • Abilify Unspecified     \"Feeling tired, like I don't even know whats going on around me\"   • Fish      Pt reports fish causes him to be sick to his stomach     • Geodon [Ziprasidone Hcl]        Medications  Prior to Admission Medications   Prescriptions Last Dose Informant Patient Reported? Taking?   Cholecalciferol (VITAMIN D) 2000 UNIT Tab 10/16/2020 at 2000 MAR from Other Facility Yes No   Sig: Take 4,000 Units by mouth every bedtime. 2 tablets = 4000 units   Empagliflozin (JARDIANCE) 25 MG Tab 10/16/2020 at 0800 MAR from Other Facility Yes No   Sig: Take 25 mg by mouth every day.   LATUDA 20 MG Tab 10/16/2020 at 2000 MAR from Other Facility Yes No   Sig: Take 20 mg by mouth every bedtime.   acetaminophen (TYLENOL) 500 MG Tab 10/16/2020 at 0645 MAR from Other Facility Yes Yes   Sig: Take 1,000 mg by mouth every 6 hours as needed for Mild Pain.   aspirin EC (ECOTRIN) 81 MG Tablet Delayed Response 10/16/2020 at 0800 MAR from Other Facility Yes No   Sig: Take 81 mg by mouth every morning.   atorvastatin (LIPITOR) 80 MG tablet 10/16/2020 at 2000 MAR from Other Facility Yes No   Sig: Take 80 mg by mouth every evening.   budesonide-formoterol (SYMBICORT) 160-4.5 MCG/ACT Aerosol 10/16/2020 at 2000 MAR from Other Facility Yes No   Sig: Inhale 2 Puffs by mouth 2 Times a Day.   busPIRone (BUSPAR) 10 MG Tab tablet 10/16/2020 at 2000 MAR from Other Facility Yes No   Sig: Take 10 mg by mouth 3 times a day.   divalproex (DEPAKOTE) 500 MG Tablet Delayed Response Not Taking at Unknown time MAR from Other Facility No No   Sig: Take 2 Tabs by mouth every morning.   Patient not taking: Reported on 10/16/2020   divalproex (DEPAKOTE) 500 MG Tablet Delayed Response Not Taking at Unknown time MAR from Other Facility No No   Sig: Take 3 Tabs by mouth every evening.   Patient not " taking: Reported on 10/16/2020   divalproex (DEPAKOTE) 500 MG Tablet Delayed Response 10/16/2020 at 2000 MAR from Other Facility Yes Yes   Sig: Take 500-1,500 mg by mouth 2 Times a Day. Take 1 tab qam and 3 tabs qhs   famotidine (PEPCID) 10 MG tablet 10/16/2020 at 2000 MAR from Other Facility Yes No   Sig: Take 10 mg by mouth 2 times a day.   fenofibrate (ANTARA) 130 MG capsule 10/16/2020 at 2000 MAR from Other Facility Yes No   Sig: Take 130 mg by mouth every evening.   glimepiride (AMARYL) 4 MG Tab 10/16/2020 at 0800 MAR from Other Facility Yes No   Sig: Take 4 mg by mouth every morning.   levothyroxine (SYNTHROID) 200 MCG Tab 10/16/2020 at 0800 MAR from Other Facility Yes No   Sig: Take 200 mcg by mouth Every morning on an empty stomach. Pt takes 200 mcg and 25 mcg for a total dose of 225 mcg every morning   levothyroxine (SYNTHROID) 25 MCG Tab 10/16/2020 at 0800 MAR from Other Facility Yes No   Sig: Take 25 mcg by mouth Every morning on an empty stomach. Pt takes 200 mcg and 25 mcg for a total dose of 225 mcg every morning   lisinopril (PRINIVIL) 10 MG Tab 10/16/2020 at 0800 MAR from Other Facility Yes No   Sig: Take 10 mg by mouth every day.   metformin (GLUCOPHAGE) 1000 MG tablet 10/16/2020 at 2000 MAR from Other Facility Yes No   Sig: Take 1,000 mg by mouth 2 times a day.   montelukast (SINGULAIR) 10 MG Tab 10/16/2020 at 2000 MAR from Other Facility Yes No   Sig: Take 10 mg by mouth every evening.   omeprazole (PRILOSEC) 20 MG delayed-release capsule 10/16/2020 at 2000 MAR from Other Facility Yes Yes   Sig: Take 20 mg by mouth 2 times a day.   prazosin (MINIPRESS) 2 MG Cap 10/16/2020 at 2000 MAR from Other Facility Yes No   Sig: Take 4 mg by mouth every evening. 2 capsules = 4 mg   sertraline (ZOLOFT) 100 MG Tab 10/16/2020 at 0800 MAR from Other Facility Yes No   Sig: Take 100 mg by mouth every day.   topiramate (TOPAMAX) 25 MG Tab 10/16/2020 at 0800 MAR from Other Facility No No   Sig: Take 1 Tab by mouth 2  times a day.   Patient taking differently: Take 25 mg by mouth every day.   traZODone (DESYREL) 100 MG Tab 10/9/2020 at 1900 MAR from Other Facility Yes No   Sig: Take 100 mg by mouth at bedtime as needed.      Facility-Administered Medications: None       Physical Exam  Pulse:  [69-76] 69  Resp:  [15-29] 15  BP: (126-139)/(63-77) 139/63  SpO2:  [95 %-96 %] 95 %    Physical Exam  Vitals signs and nursing note reviewed.   HENT:      Head: Normocephalic and atraumatic.      Right Ear: External ear normal.      Left Ear: External ear normal.      Nose: Nose normal.      Mouth/Throat:      Mouth: Mucous membranes are moist.      Pharynx: Oropharynx is clear.   Eyes:      General: No scleral icterus.     Conjunctiva/sclera: Conjunctivae normal.   Neck:      Musculoskeletal: Normal range of motion and neck supple.   Cardiovascular:      Rate and Rhythm: Normal rate and regular rhythm.   Pulmonary:      Effort: Pulmonary effort is normal.      Breath sounds: Decreased breath sounds present.   Abdominal:      Palpations: Abdomen is soft.      Tenderness: There is no abdominal tenderness. There is no guarding.   Musculoskeletal:         General: No swelling or signs of injury.   Skin:     General: Skin is warm and dry.   Neurological:      Deep Tendon Reflexes: Babinski sign (present) absent on the right side. Babinski sign (present) absent on the left side.      Comments: Right sided drop arm test without reflexive movement.   Psychiatric:      Comments: Unable to assess due to mental status         Laboratory:  Recent Labs     10/16/20  2016   WBC 8.5   RBC 4.16*   HEMOGLOBIN 12.2*   HEMATOCRIT 37.6*   MCV 90.4   MCH 29.3   MCHC 32.4*   RDW 48.6   PLATELETCT 273   MPV 10.0     Recent Labs     10/16/20  2016   SODIUM 133*   POTASSIUM 4.0   CHLORIDE 97   CO2 21   GLUCOSE 191*   BUN 30*   CREATININE 1.01   CALCIUM 9.2     Recent Labs     10/16/20  2016   ALTSGPT 28   ASTSGOT 25   ALKPHOSPHAT 50   TBILIRUBIN <0.2   GLUCOSE  191*     Recent Labs     10/16/20  2016   APTT 27.0   INR 0.97     No results for input(s): NTPROBNP in the last 72 hours.      Recent Labs     10/16/20  2016   TROPONINT 18       Imaging:  DX-CHEST-PORTABLE (1 VIEW)   Final Result      Hypoinflation without acute cardiopulmonary abnormality.      CT-CEREBRAL PERFUSION ANALYSIS   Final Result      1.  Cerebral blood flow less than 30% likely representing completed infarct = 0 mL.      2.  T Max more than 6 seconds likely representing combination of completed infarct and ischemia = 0 mL.      3.  Mismatched volume likely representing ischemic brain/penumbra = None      4.  Please note that the cerebral perfusion was performed on the limited brain tissue around the basal ganglia region. Infarct/ischemia outside the CT perfusion sections can be missed in this study.      CT-CTA NECK WITH & W/O-POST PROCESSING   Final Result      No significant stenosis or dissection of the neck arteries      CT-CTA HEAD WITH & W/O-POST PROCESS   Final Result      CT angiogram of the [place] within normal limits.      CT-HEAD W/O   Final Result      1.  Stable severe nonspecific supratentorial white matter disease.   2.  No acute intracranial abnormality.            Assessment/Plan:  I anticipate this patient is appropriate for observation status at this time.    Acute right-sided weakness- (present on admission)  Assessment & Plan  Recurrent.  Patient has history of conversion.  No evidence of intracranial abnormality on CT stroke series.  Admit to obs.  ASA, statin.  A1c, lipid panel.  Neurology consulted, appreciate recommendations.  Consult psychiatry.    Type 2 diabetes mellitus with hyperglycemia, without long-term current use of insulin (HCC)- (present on admission)  Assessment & Plan  Poorly controlled with last A1c 9.5 in 6/20.  Hold oral glycemic agents.  Insulin sliding scale, Accu-Cheks, hypoglycemia protocol.    Headache- (present on admission)  Assessment &  Plan  No evidence of intracranial bleeding on CT stroke series.  Headache cocktail per neurology.   Monitor.    Psychiatric problem- (present on admission)  Assessment & Plan  Bipolar depression, PTSD.  Continue home medications.

## 2020-10-17 NOTE — ASSESSMENT & PLAN NOTE
Poorly controlled with last A1c 9.5 in 6/20.  Hold oral glycemic agents.  Insulin sliding scale, Accu-Cheks, hypoglycemia protocol.

## 2020-10-17 NOTE — ED NOTES
Med rec complete per MAR from transferring facility, pt is unable to participate in interview at this time. Allergies reviewed. No antibiotics noted in the past 14 days.

## 2020-10-17 NOTE — PROGRESS NOTES
Chief Complaint   Patient presents with   • Possible Stroke     BIB EMS FROM custodial FOR SLOW SLURRED SPEECH AND RIGHT SIDED WEAKNESS STARTING 1845.     Neurology Progress Note     Brief History of present illness:  40-year old male well known to our neurology service, with PMhx significant for anxiety, depression, bipolar, conversion disorder (numerous hospital presentations for near identical complaints-- 4 hospitalizations within the past year-- unilateral weakness and altered speech/stuttered speech; organic causes have been ruled out numerous times with MR Brain, C spine) intellectually disabled/group home resident, hypothyroid, type II DM who again presented to [facility] on 10/16/20 for a chief complaint of Right sided weakness, stuttered speech and headache, onset 1845 on 10/16/20. Exam in ED overwhelmingly consistent with psychosomatic etiology-- positive Romero's sign, sensory deficit that splits midline, and stuttered speech-- a finding that is largely psychogenic. CT head with no acute intracranial abnormality; MRI Brain also with no acute intracranial abnormality.    Interval, 10/17/20:   Patient sitting up in bed; awake and alert. Still with subjective report of RUE/RLE flaccidity and diffuse martinez body numbness. Admits to associated mid frontal headache earlier this morning; now resolved. No events overnight.     No changes to HPI as was previously documented.     Past medical history:   Past Medical History:   Diagnosis Date   • Anxiety     BIPOLAR   • ASTHMA    • Bipolar 1 disorder (HCC)    • Depression    • Diabetes (HCC)     Type II Diabetes   • Fall     passed out 2 wks ago   • Glaucoma    • Glaucoma 1982    both eyes/ blind on left eye   • Hypothyroidism    • Indigestion     once in a while   • Mental disorder     learning disabilities; speech impairment; developmental delays   • Murmur     since birth   • Pneumonia     remote   • Psychiatric problem 2002    PTSD   • S/P thyroidectomy     • Seizure (HCC) 2010   • Seizure disorder (HCC)    • Unspecified disorder of thyroid        Past surgical history:   Past Surgical History:   Procedure Laterality Date   • EYE SURGERY     • OTHER      Hernia Repair when he was 10 yrs old   • THYROID LOBECTOMY         Family history:   Family History   Problem Relation Age of Onset   • Hypertension Mother    • Heart Disease Mother    • Lung Disease Mother    • Stroke Maternal Grandmother        Social history:   Social History     Socioeconomic History   • Marital status: Single     Spouse name: Not on file   • Number of children: Not on file   • Years of education: Not on file   • Highest education level: Not on file   Occupational History   • Not on file   Social Needs   • Financial resource strain: Somewhat hard   • Food insecurity     Worry: Sometimes true     Inability: Sometimes true   • Transportation needs     Medical: No     Non-medical: No   Tobacco Use   • Smoking status: Former Smoker     Packs/day: 0.25     Types: Cigarettes, Cigars     Quit date: 2020     Years since quittin.3   • Smokeless tobacco: Never Used   Substance and Sexual Activity   • Alcohol use: Yes     Frequency: 2-3 times a week   • Drug use: Not Currently     Types: Inhaled     Comment: marijuana   • Sexual activity: Not on file   Lifestyle   • Physical activity     Days per week: Not on file     Minutes per session: Not on file   • Stress: Not on file   Relationships   • Social connections     Talks on phone: Not on file     Gets together: Not on file     Attends Sikhism service: Not on file     Active member of club or organization: Not on file     Attends meetings of clubs or organizations: Not on file     Relationship status: Not on file   • Intimate partner violence     Fear of current or ex partner: Not on file     Emotionally abused: Not on file     Physically abused: Not on file     Forced sexual activity: Not on file   Other Topics Concern   • Not on file   Social  History Narrative   • Not on file       Current medications:   Current Facility-Administered Medications   Medication Dose   • aspirin EC (ECOTRIN) tablet 81 mg  81 mg   • atorvastatin (LIPITOR) tablet 80 mg  80 mg   • budesonide-formoterol (SYMBICORT) 160-4.5 MCG/ACT inhaler 2 Puff  2 Puff   • busPIRone (BUSPAR) tablet 10 mg  10 mg   • divalproex (DEPAKOTE) delayed-release tablet 500 mg  500 mg   • divalproex (DEPAKOTE) delayed-release tablet 1,500 mg  1,500 mg   • fenofibrate micronized (LOFIBRA) capsule 134 mg  134 mg   • levothyroxine (SYNTHROID) tablet 200 mcg  200 mcg   • levothyroxine (SYNTHROID) tablet 25 mcg  25 mcg   • lisinopril (PRINIVIL) tablet 10 mg  10 mg   • montelukast (SINGULAIR) tablet 10 mg  10 mg   • sertraline (Zoloft) tablet 100 mg  100 mg   • topiramate (TOPAMAX) tablet 25 mg  25 mg   • lurasidone (LATUDA) tablet 20 mg  20 mg   • traZODone (DESYREL) tablet 100 mg  100 mg   • Pharmacy consult request - Allow for permissive hypertension: SBP up to 220 mmHg/DBP up to 120 mmHg x 48 hours     • enoxaparin (LOVENOX) inj 40 mg  40 mg   • acetaminophen (TYLENOL) tablet 650 mg  650 mg   • ondansetron (ZOFRAN) syringe/vial injection 4 mg  4 mg   • ondansetron (ZOFRAN ODT) dispertab 4 mg  4 mg   • promethazine (PHENERGAN) tablet 12.5-25 mg  12.5-25 mg   • promethazine (PHENERGAN) suppository 12.5-25 mg  12.5-25 mg   • prochlorperazine (COMPAZINE) injection 5-10 mg  5-10 mg   • senna-docusate (PERICOLACE or SENOKOT S) 8.6-50 MG per tablet 2 Tab  2 Tab    And   • polyethylene glycol/lytes (MIRALAX) PACKET 1 Packet  1 Packet    And   • magnesium hydroxide (MILK OF MAGNESIA) suspension 30 mL  30 mL    And   • bisacodyl (DULCOLAX) suppository 10 mg  10 mg   • insulin regular (HumuLIN R,NovoLIN R) injection  1-6 Units    And   • glucose 4 g chewable tablet 16 g  16 g    And   • dextrose 50% (D50W) injection 50 mL  50 mL   • labetalol (NORMODYNE/TRANDATE) injection 10 mg  10 mg       Medication  "Allergy:  Allergies   Allergen Reactions   • Abilify Unspecified     \"Feeling tired, like I don't even know whats going on around me\"   • Fish      Pt reports fish causes him to be sick to his stomach     • Geodon [Ziprasidone Hcl]        Review of systems:   As noted above; otherwise all systems reviewed and determined to be non-contributory.     Physical examination:   Vitals:    10/17/20 0032 10/17/20 0400 10/17/20 0800 10/17/20 1200   BP: 114/68 121/68 105/64 102/64   Pulse: 62 91 (!) 56 62   Resp: 16 16 16 16   Temp: 36.1 °C (97 °F) 36.1 °C (96.9 °F) 36.7 °C (98 °F) 36.3 °C (97.3 °F)   TempSrc: Temporal Temporal Temporal Temporal   SpO2: 95% 94% 92% 94%   Weight: (!) 129.9 kg (286 lb 6 oz)      Height:         General: Patient in no acute distress; flat affect.  HEENT: Normocephalic, no signs of acute trauma.   Neck: supple, no meningeal signs or carotid bruits. There is normal range of motion. No tenderness on exam.   Chest: clear to auscultation. No cough.   CV: RRR, no murmurs.   Skin: no signs of acute rashes or trauma.   Musculoskeletal: joints exhibit full range of motion, without any pain to palpation. There are no signs of joint or muscle swelling. There is no tenderness to deep palpation of muscles.   Psychiatric: No hallucinatory behavior. Denies symptoms of depression or suicidal ideation. Mood and affect appear normal on exam.     NEUROLOGICAL EXAM:   Mental status, orientation: Awake, alert and fully oriented.   Speech and language: speech is stuttered, non dysarthric. The patient is able to name, repeat and comprehend.   Memory: There is intact recollection of recent and remote events.   Cranial nerve exam: Pupils are 3-4 mm bilaterally and equally reactive to light. Visual fields are intact by confrontation. There is no nystagmus on primary or secondary gaze. Intact full EOM in all directions of gaze. Face appears symmetric. Sensation in the face is intact to light touch. Tongue is midline and " without any signs of tongue biting or fasciculations. Shoulder shrug is intact bilaterally.   Motor exam: Strength is 5/5 in LUE/LLE; 0/5 to RUE/RLE with minimal effort, positive Hoovers sign, positive hand-drop sign, also note tone is normal. No abnormal movements were seen on exam.   Sensory exam Subjective dense martinez body numbness to RUE/RLE; otherwise reveals normal sense of light touch and pinprick in all extremities.   Deep tendon reflexes:  2+ throughout. Plantar responses are flexor. There is no clonus.   Coordination: Normal rapidly alternating movements on the Left.   Gait: Not assessed at this time as patient is a fall risk.       ANCILLARY DATA REVIEWED:     Lab Data Review:  Recent Results (from the past 24 hour(s))   CBC WITH DIFFERENTIAL    Collection Time: 10/16/20  8:16 PM   Result Value Ref Range    WBC 8.5 4.8 - 10.8 K/uL    RBC 4.16 (L) 4.70 - 6.10 M/uL    Hemoglobin 12.2 (L) 14.0 - 18.0 g/dL    Hematocrit 37.6 (L) 42.0 - 52.0 %    MCV 90.4 81.4 - 97.8 fL    MCH 29.3 27.0 - 33.0 pg    MCHC 32.4 (L) 33.7 - 35.3 g/dL    RDW 48.6 35.9 - 50.0 fL    Platelet Count 273 164 - 446 K/uL    MPV 10.0 9.0 - 12.9 fL    Neutrophils-Polys 53.70 44.00 - 72.00 %    Lymphocytes 34.00 22.00 - 41.00 %    Monocytes 9.00 0.00 - 13.40 %    Eosinophils 1.20 0.00 - 6.90 %    Basophils 0.70 0.00 - 1.80 %    Immature Granulocytes 1.40 (H) 0.00 - 0.90 %    Nucleated RBC 0.00 /100 WBC    Neutrophils (Absolute) 4.56 1.82 - 7.42 K/uL    Lymphs (Absolute) 2.89 1.00 - 4.80 K/uL    Monos (Absolute) 0.76 0.00 - 0.85 K/uL    Eos (Absolute) 0.10 0.00 - 0.51 K/uL    Baso (Absolute) 0.06 0.00 - 0.12 K/uL    Immature Granulocytes (abs) 0.12 (H) 0.00 - 0.11 K/uL    NRBC (Absolute) 0.00 K/uL   COMP METABOLIC PANEL    Collection Time: 10/16/20  8:16 PM   Result Value Ref Range    Sodium 133 (L) 135 - 145 mmol/L    Potassium 4.0 3.6 - 5.5 mmol/L    Chloride 97 96 - 112 mmol/L    Co2 21 20 - 33 mmol/L    Anion Gap 15.0 7.0 - 16.0    Glucose  191 (H) 65 - 99 mg/dL    Bun 30 (H) 8 - 22 mg/dL    Creatinine 1.01 0.50 - 1.40 mg/dL    Calcium 9.2 8.5 - 10.5 mg/dL    AST(SGOT) 25 12 - 45 U/L    ALT(SGPT) 28 2 - 50 U/L    Alkaline Phosphatase 50 30 - 99 U/L    Total Bilirubin <0.2 0.1 - 1.5 mg/dL    Albumin 3.9 3.2 - 4.9 g/dL    Total Protein 6.0 6.0 - 8.2 g/dL    Globulin 2.1 1.9 - 3.5 g/dL    A-G Ratio 1.9 g/dL   PROTHROMBIN TIME    Collection Time: 10/16/20  8:16 PM   Result Value Ref Range    PT 13.2 12.0 - 14.6 sec    INR 0.97 0.87 - 1.13   APTT    Collection Time: 10/16/20  8:16 PM   Result Value Ref Range    APTT 27.0 24.7 - 36.0 sec   COD (ADULT)    Collection Time: 10/16/20  8:16 PM   Result Value Ref Range    ABO Grouping Only A     Rh Grouping Only POS     Antibody Screen-Cod NEG    TROPONIN    Collection Time: 10/16/20  8:16 PM   Result Value Ref Range    Troponin T 18 6 - 19 ng/L   ESTIMATED GFR    Collection Time: 10/16/20  8:16 PM   Result Value Ref Range    GFR If African American >60 >60 mL/min/1.73 m 2    GFR If Non African American >60 >60 mL/min/1.73 m 2   EKG (NOW)    Collection Time: 10/16/20 10:04 PM   Result Value Ref Range    Report       [facility] Emergency Dept[facility]    Test Date:  2020-10-16  Pt Name:    HOLLY KIM                 Department: ER  MRN:        [id number]                      Room:        11  Gender:     Male                         Technician: [id number]  :        1980                   Requested By:HEATHER MOTLEY  Order #:    [id number]                    Philippe MD:    Measurements  Intervals                                Axis  Rate:       69                           P:          47  WV:         184                          QRS:        18  QRSD:       110                          T:          30  QT:         368  QTc:        395    Interpretive Statements  SINUS RHYTHM  NONSPECIFIC INTRAVENTRICULAR CONDUCTION DELAY  LOW VOLTAGE IN FRONTAL LEADS  Compared to ECG 10/05/2020 21:17:01  Low QRS voltage  now present     Routine (COVID/SARS COV-2 In-House PCR up to 24 hours)    Collection Time: 10/16/20 10:09 PM    Specimen: Nasopharyngeal; Respirate   Result Value Ref Range    COVID Order Status Received    SARS-CoV-2, PCR (In-House)    Collection Time: 10/16/20 10:09 PM   Result Value Ref Range    SARS-CoV-2 Source NP Swab    ACCU-CHEK GLUCOSE    Collection Time: 10/17/20  1:18 AM   Result Value Ref Range    Glucose - Accu-Ck 135 (H) 65 - 99 mg/dL   ACCU-CHEK GLUCOSE    Collection Time: 10/17/20  6:08 AM   Result Value Ref Range    Glucose - Accu-Ck 114 (H) 65 - 99 mg/dL   Magnesium    Collection Time: 10/17/20  7:37 AM   Result Value Ref Range    Magnesium 2.3 1.5 - 2.5 mg/dL   ACCU-CHEK GLUCOSE    Collection Time: 10/17/20 12:17 PM   Result Value Ref Range    Glucose - Accu-Ck 155 (H) 65 - 99 mg/dL       Labs reviewed by me.     Records reviewed:   Reviewed records from patient previous hospitalizations at [facility].       Imaging reviewed by me:     MR-BRAIN-W/O   Final Result      1.  No evidence of acute territorial infarct, intracranial hemorrhage or mass lesion.   2.  Mild diffuse cerebral substance loss.   3.  Redemonstrated diffuse confluent T2 and FLAIR signal hyperintensity in the subcortical and periventricular white matter of the bilateral cerebral hemispheres with unchanged differential consideration of dysmyelination or demyelination versus remote    toxic insult.      DX-CHEST-PORTABLE (1 VIEW)   Final Result      Hypoinflation without acute cardiopulmonary abnormality.      CT-CEREBRAL PERFUSION ANALYSIS   Final Result      1.  Cerebral blood flow less than 30% likely representing completed infarct = 0 mL.      2.  T Max more than 6 seconds likely representing combination of completed infarct and ischemia = 0 mL.      3.  Mismatched volume likely representing ischemic brain/penumbra = None      4.  Please note that the cerebral perfusion was performed on the limited brain tissue around the basal  ganglia region. Infarct/ischemia outside the CT perfusion sections can be missed in this study.      CT-CTA NECK WITH & W/O-POST PROCESSING   Final Result      No significant stenosis or dissection of the neck arteries      CT-CTA HEAD WITH & W/O-POST PROCESS   Final Result      CT angiogram of the [place] within normal limits.      CT-HEAD W/O   Final Result      1.  Stable severe nonspecific supratentorial white matter disease.   2.  No acute intracranial abnormality.            Modified [place] Scale (MRS): 2 = Slight disability; unable to perform all previous activities but able to look after own affairs without assistance      ASSESSMENT AND PLAN:  40-year old male well known to our neurology service, with PMhx significant for anxiety, depression, bipolar, conversion disorder (numerous hospital presentations for near identical complaints-- 4 hospitalizations within the past year-- unilateral weakness and altered speech/stuttered speech; organic causes have been ruled out numerous times with MR Brain, C spine) intellectually disabled/group home resident, hypothyroid, type II DM who again presented to [facility] on 10/16/20 for a chief complaint of Right sided weakness, stuttered speech and headache, onset 1845 on 10/16/20; exam here overwhelmingly functional, with positive Hoovers sign, positive hand drop sign, numbness splits midline. Additionally, MRI Brain revealed no acute intracranial abnormality.   Psychiatry services was consulted on patient's arrival here yesterday; per their notes, patient has apparently been seen by their service several times as well, with numerous recommendations for  psychotherapy/CBT for Conversion disorder.   Unclear if patient is actively receiving outpatient therapy, however psychiatry recommends referral back to [facility] for further outpatient psychiatric treatment.     Given then above, neurology will sign off; patient is clear for discharge with plan to  discharge back to his facility/plan for outpatient psychotherapy.     The plan of care above has been discussed with Dr. Ayala. Please call with questions.    DANY Saenz.  [facility]

## 2020-10-18 LAB
GLUCOSE BLD-MCNC: 139 MG/DL (ref 65–99)
GLUCOSE BLD-MCNC: 141 MG/DL (ref 65–99)
GLUCOSE BLD-MCNC: 141 MG/DL (ref 65–99)
GLUCOSE BLD-MCNC: 160 MG/DL (ref 65–99)
GLUCOSE BLD-MCNC: 162 MG/DL (ref 65–99)

## 2020-10-18 PROCEDURE — 96372 THER/PROPH/DIAG INJ SC/IM: CPT

## 2020-10-18 PROCEDURE — 97166 OT EVAL MOD COMPLEX 45 MIN: CPT

## 2020-10-18 PROCEDURE — 770006 HCHG ROOM/CARE - MED/SURG/GYN SEMI*

## 2020-10-18 PROCEDURE — A9270 NON-COVERED ITEM OR SERVICE: HCPCS | Performed by: STUDENT IN AN ORGANIZED HEALTH CARE EDUCATION/TRAINING PROGRAM

## 2020-10-18 PROCEDURE — 99231 SBSQ HOSP IP/OBS SF/LOW 25: CPT | Performed by: STUDENT IN AN ORGANIZED HEALTH CARE EDUCATION/TRAINING PROGRAM

## 2020-10-18 PROCEDURE — 700102 HCHG RX REV CODE 250 W/ 637 OVERRIDE(OP): Performed by: INTERNAL MEDICINE

## 2020-10-18 PROCEDURE — 82962 GLUCOSE BLOOD TEST: CPT | Mod: 91

## 2020-10-18 PROCEDURE — A9270 NON-COVERED ITEM OR SERVICE: HCPCS | Performed by: INTERNAL MEDICINE

## 2020-10-18 PROCEDURE — 97162 PT EVAL MOD COMPLEX 30 MIN: CPT

## 2020-10-18 PROCEDURE — 700111 HCHG RX REV CODE 636 W/ 250 OVERRIDE (IP): Performed by: INTERNAL MEDICINE

## 2020-10-18 PROCEDURE — 700102 HCHG RX REV CODE 250 W/ 637 OVERRIDE(OP): Performed by: STUDENT IN AN ORGANIZED HEALTH CARE EDUCATION/TRAINING PROGRAM

## 2020-10-18 RX ORDER — DEXTROSE MONOHYDRATE 25 G/50ML
50 INJECTION, SOLUTION INTRAVENOUS
Status: DISCONTINUED | OUTPATIENT
Start: 2020-10-18 | End: 2020-10-30 | Stop reason: HOSPADM

## 2020-10-18 RX ADMIN — TOPIRAMATE 25 MG: 25 TABLET, FILM COATED ORAL at 05:24

## 2020-10-18 RX ADMIN — BUDESONIDE AND FORMOTEROL FUMARATE DIHYDRATE 2 PUFF: 160; 4.5 AEROSOL RESPIRATORY (INHALATION) at 17:29

## 2020-10-18 RX ADMIN — ENOXAPARIN SODIUM 40 MG: 40 INJECTION SUBCUTANEOUS at 05:25

## 2020-10-18 RX ADMIN — INSULIN HUMAN 1 UNITS: 100 INJECTION, SOLUTION PARENTERAL at 05:31

## 2020-10-18 RX ADMIN — BUSPIRONE HYDROCHLORIDE 10 MG: 10 TABLET ORAL at 12:26

## 2020-10-18 RX ADMIN — MONTELUKAST 10 MG: 10 TABLET, FILM COATED ORAL at 17:28

## 2020-10-18 RX ADMIN — ATORVASTATIN CALCIUM 80 MG: 80 TABLET, FILM COATED ORAL at 17:27

## 2020-10-18 RX ADMIN — BUDESONIDE AND FORMOTEROL FUMARATE DIHYDRATE 2 PUFF: 160; 4.5 AEROSOL RESPIRATORY (INHALATION) at 05:24

## 2020-10-18 RX ADMIN — SERTRALINE HYDROCHLORIDE 100 MG: 100 TABLET ORAL at 05:24

## 2020-10-18 RX ADMIN — FENOFIBRATE 134 MG: 134 CAPSULE ORAL at 17:28

## 2020-10-18 RX ADMIN — ACETAMINOPHEN 650 MG: 325 TABLET, FILM COATED ORAL at 18:07

## 2020-10-18 RX ADMIN — ASPIRIN 81 MG: 81 TABLET, COATED ORAL at 05:24

## 2020-10-18 RX ADMIN — BUSPIRONE HYDROCHLORIDE 10 MG: 10 TABLET ORAL at 05:24

## 2020-10-18 RX ADMIN — LURASIDONE HYDROCHLORIDE 20 MG: 20 TABLET, FILM COATED ORAL at 19:59

## 2020-10-18 RX ADMIN — DIVALPROEX SODIUM 1500 MG: 500 TABLET, DELAYED RELEASE ORAL at 17:28

## 2020-10-18 RX ADMIN — LEVOTHYROXINE SODIUM 25 MCG: 25 TABLET ORAL at 05:24

## 2020-10-18 RX ADMIN — LEVOTHYROXINE SODIUM 200 MCG: 0.2 TABLET ORAL at 05:25

## 2020-10-18 RX ADMIN — DIVALPROEX SODIUM 500 MG: 500 TABLET, DELAYED RELEASE ORAL at 05:24

## 2020-10-18 RX ADMIN — BUSPIRONE HYDROCHLORIDE 10 MG: 10 TABLET ORAL at 17:27

## 2020-10-18 ASSESSMENT — COGNITIVE AND FUNCTIONAL STATUS - GENERAL
PERSONAL GROOMING: A LITTLE
DRESSING REGULAR LOWER BODY CLOTHING: A LOT
DAILY ACTIVITIY SCORE: 15
TOILETING: A LOT
STANDING UP FROM CHAIR USING ARMS: A LOT
HELP NEEDED FOR BATHING: A LOT
MOVING TO AND FROM BED TO CHAIR: A LOT
MOVING FROM LYING ON BACK TO SITTING ON SIDE OF FLAT BED: UNABLE
TURNING FROM BACK TO SIDE WHILE IN FLAT BAD: A LOT
WALKING IN HOSPITAL ROOM: TOTAL
DRESSING REGULAR UPPER BODY CLOTHING: A LITTLE
CLIMB 3 TO 5 STEPS WITH RAILING: TOTAL
SUGGESTED CMS G CODE MODIFIER DAILY ACTIVITY: CK
MOBILITY SCORE: 9
EATING MEALS: A LITTLE
SUGGESTED CMS G CODE MODIFIER MOBILITY: CM

## 2020-10-18 ASSESSMENT — ACTIVITIES OF DAILY LIVING (ADL): TOILETING: INDEPENDENT

## 2020-10-18 ASSESSMENT — ENCOUNTER SYMPTOMS
COUGH: 0
CHILLS: 0
WEAKNESS: 1
CONSTIPATION: 0
MYALGIAS: 0
BLURRED VISION: 0
SENSORY CHANGE: 1
DOUBLE VISION: 0
VOMITING: 0
SPUTUM PRODUCTION: 0
FEVER: 0
ABDOMINAL PAIN: 0
SPEECH CHANGE: 1
DIZZINESS: 1
SHORTNESS OF BREATH: 0
NAUSEA: 1
PALPITATIONS: 0
SINUS PAIN: 0
FOCAL WEAKNESS: 0
SORE THROAT: 0
DIARRHEA: 0
WHEEZING: 0
NERVOUS/ANXIOUS: 0
HEADACHES: 1

## 2020-10-18 ASSESSMENT — GAIT ASSESSMENTS: GAIT LEVEL OF ASSIST: UNABLE TO PARTICIPATE

## 2020-10-18 ASSESSMENT — PAIN DESCRIPTION - PAIN TYPE
TYPE: ACUTE PAIN
TYPE: ACUTE PAIN

## 2020-10-18 NOTE — PROGRESS NOTES
Pt assessment completed pt unable to move right side of body. When right foot was tickled pt had trace contraction in toes.   Per PT/OT pt was able to move right foot and arm until it was pointed out to him at which point he stopped using them.

## 2020-10-18 NOTE — CARE PLAN
Problem: Safety  Goal: Will remain free from injury  Outcome: PROGRESSING AS EXPECTED  Note: Patient admitted with stroke-like symptoms. Patient encouraged to use call light prior to ambulating to avoid injury and falls. Patient expressing understanding and using call light appropriately for assistance. Bed locked and in lowest position, bed alarm on. Call light and belongings within reach. Hourly rounding in place.      Problem: Knowledge Deficit  Goal: Knowledge of disease process/condition, treatment plan, diagnostic tests, and medications will improve  Outcome: PROGRESSING AS EXPECTED  Note: Patient admitted with stroke-like symptoms. Patient updated on plan of care; pending therapy evaluations discussed.

## 2020-10-18 NOTE — PROGRESS NOTES
Hospital Medicine Daily Progress Note    Date of Service  10/18/2020    Chief Complaint  Headache and recurrent right-sided weakness     Hospital Course  40 y.o. male with Medical history of type 2 diabetes mellitus, seizure disorder, conversion disorder admitted 10/16/2020 with headache and recurrent right-sided weakness in his upper and lower extremities.  Patient is a current resident of [facility] [place].  Patient has multiple admissions this year for similar symptoms and work-up done she will no CVA or no intracranial abnormality.  Neurology consulted recommended IV Benadryl as part of the headache cocktail.  CT head, CTA head and neck, CT cerebral perfusion and MRI brain showed no acute abnormality.   Psychiatry  consulted , patient was seen by Sarah several times the past, with numerous recommendations for  psychotherapy/CBT for Conversion disorder. Psychiatry recommends referral back to [facility] [place] for further outpatient psychiatric treatment.  Psychiatry and neurology signed off.  Pending PT/OT evaluation.     Interval Problem Update  No acute event overnight.  Headache is improved but still significant weakness in his right-sided upper and lower extremities.   Pt works with PT/OT this morning.  PT/OT recommended SNF.     Consultants/Specialty  Neurology  Psychiatry    Code Status  Full Code    Disposition  Pending SNF placement    Review of Systems  Review of Systems   Constitutional: Positive for malaise/fatigue. Negative for chills and fever.   HENT: Negative for congestion, ear discharge, ear pain, sinus pain and sore throat.    Eyes: Negative for blurred vision and double vision.   Respiratory: Negative for cough, sputum production, shortness of breath and wheezing.    Cardiovascular: Negative for chest pain, palpitations and leg swelling.   Gastrointestinal: Positive for nausea. Negative for abdominal pain, constipation, diarrhea and vomiting.   Genitourinary: Negative for dysuria, frequency and urgency.    Musculoskeletal: Negative for myalgias.   Neurological: Positive for dizziness, sensory change, speech change, weakness and headaches. Negative for focal weakness.   Psychiatric/Behavioral: The patient is not nervous/anxious.         Physical Exam  Temp:  [36.3 °C (97.3 °F)-36.6 °C (97.9 °F)] 36.6 °C (97.9 °F)  Pulse:  [51-62] 53  Resp:  [16-18] 18  BP: (102-116)/(50-71) 102/59  SpO2:  [94 %-95 %] 95 %    Physical Exam  Constitutional:       General: He is not in acute distress.     Appearance: He is obese.   HENT:      Head: Normocephalic and atraumatic.      Nose: Nose normal.      Mouth/Throat:      Mouth: Mucous membranes are moist.      Pharynx: No posterior oropharyngeal erythema.   Eyes:      General: No scleral icterus.     Extraocular Movements: Extraocular movements intact.      Conjunctiva/sclera: Conjunctivae normal.      Pupils: Pupils are equal, round, and reactive to light.   Neck:      Musculoskeletal: Normal range of motion and neck supple. No neck rigidity.   Cardiovascular:      Rate and Rhythm: Normal rate and regular rhythm.      Pulses: Normal pulses.      Heart sounds: Normal heart sounds. No murmur. No gallop.    Pulmonary:      Effort: Pulmonary effort is normal.      Breath sounds: Normal breath sounds. No stridor. No wheezing, rhonchi or rales.   Abdominal:      General: Bowel sounds are normal.      Palpations: Abdomen is soft.   Musculoskeletal:         General: No swelling or tenderness.   Skin:     General: Skin is warm.   Neurological:      Mental Status: He is alert.      Motor: Weakness present.      Comments: Motor 1/5 in right upper and lower extremities  Sensation slightly reduced in right upper and lower extremities   Psychiatric:         Mood and Affect: Affect is flat.         Speech: Speech is delayed.         Behavior: Behavior is slowed.         Cognition and Memory: Memory is impaired.         Fluids    Intake/Output Summary (Last 24 hours) at 10/18/2020 1000  Last data  filed at 10/18/2020 0600  Gross per 24 hour   Intake --   Output 1800 ml   Net -1800 ml       Laboratory  Recent Labs     10/16/20  2016   WBC 8.5   RBC 4.16*   HEMOGLOBIN 12.2*   HEMATOCRIT 37.6*   MCV 90.4   MCH 29.3   MCHC 32.4*   RDW 48.6   PLATELETCT 273   MPV 10.0     Recent Labs     10/16/20  2016   SODIUM 133*   POTASSIUM 4.0   CHLORIDE 97   CO2 21   GLUCOSE 191*   BUN 30*   CREATININE 1.01   CALCIUM 9.2     Recent Labs     10/16/20  2016   APTT 27.0   INR 0.97               Imaging  MR-BRAIN-W/O   Final Result      1.  No evidence of acute territorial infarct, intracranial hemorrhage or mass lesion.   2.  Mild diffuse cerebral substance loss.   3.  Redemonstrated diffuse confluent T2 and FLAIR signal hyperintensity in the subcortical and periventricular white matter of the bilateral cerebral hemispheres with unchanged differential consideration of dysmyelination or demyelination versus remote    toxic insult.      DX-CHEST-PORTABLE (1 VIEW)   Final Result      Hypoinflation without acute cardiopulmonary abnormality.      CT-CEREBRAL PERFUSION ANALYSIS   Final Result      1.  Cerebral blood flow less than 30% likely representing completed infarct = 0 mL.      2.  T Max more than 6 seconds likely representing combination of completed infarct and ischemia = 0 mL.      3.  Mismatched volume likely representing ischemic brain/penumbra = None      4.  Please note that the cerebral perfusion was performed on the limited brain tissue around the basal ganglia region. Infarct/ischemia outside the CT perfusion sections can be missed in this study.      CT-CTA NECK WITH & W/O-POST PROCESSING   Final Result      No significant stenosis or dissection of the neck arteries      CT-CTA HEAD WITH & W/O-POST PROCESS   Final Result      CT angiogram of the [place] within normal limits.      CT-HEAD W/O   Final Result      1.  Stable severe nonspecific supratentorial white matter disease.   2.  No acute intracranial  abnormality.           Assessment/Plan  Acute right-sided weakness- (present on admission)  Assessment & Plan  Recurrent.  Patient has history of conversion.   On ASA, statin.  CT head and MRI brain no acute  Neurology signed off  Psychiatry recommend referral back to [facility] for outpatient psychiatric treatment  Fall precaution  PT/OT recommend SNF  Pending SNF placement    Seizure disorder (HCC)  Assessment & Plan  Continue home seizure medication  Fall/seizure precaution    Type 2 diabetes mellitus with hyperglycemia, without long-term current use of insulin (HCC)- (present on admission)  Assessment & Plan  Poorly controlled with last A1c 9.5 in 6/20.  Hold oral glycemic agents.  Insulin sliding scale, Accu-Cheks, hypoglycemia protocol.    Headache- (present on admission)  Assessment & Plan  S/p Headache cocktail per neurology.   Resolved     Psychiatric problem- (present on admission)  Assessment & Plan  Bipolar disorder, Depression, PTSD.  Continue home medications.       VTE prophylaxis: Lovenox

## 2020-10-18 NOTE — THERAPY
Occupational Therapy   Initial Evaluation     Patient Name: Norm Prabhakar  Age:  40 y.o., Sex:  male  Medical Record #: [id number]  Today's Date: 10/18/2020     Precautions  Precautions: Fall Risk  Comments: seizures, psych history    Assessment  Patient is 40 y.o. male with a diagnosis of acute focal neurological deficit.  Additional factors influencing patient status / progress: cooperative throughout.      Plan    Recommend Occupational Therapy 3 times per week until therapy goals are met for the following treatments:  Adaptive Equipment, Neuro Re-Education / Balance, Self Care/Activities of Daily Living, Therapeutic Activities and Therapeutic Exercises.    DC Equipment Recommendations: Unable to determine at this time  Discharge Recommendations: Recommend post-acute placement for additional occupational therapy services prior to discharge home     Subjective    Inconsistent extremity function noted throughout session. At times, left UE appeared non functional, using right UE as stabilizer and to assist during transfers, taking steps with right leg, fully weightbearing during mobility     Objective       10/18/20 0730   Total Time Spent   Total Time Spent (Mins) 40   Charge Group   OT Evaluation OT Evaluation Mod   Initial Contact Note    Initial Contact Note Order Received and Verified, Occupational Therapy Evaluation in Progress with Full Report to Follow.   Prior Living Situation   Prior Services Other (Comments)  ([facility], psych facility)   Housing / Facility 2 Story Apartment / Condo   Steps Into Home 0   Steps In Home   (flight)   Elevator No   Bathroom Set up Walk In Shower   Equipment Owned None   Lives with - Patient's Self Care Capacity Attendant / Paid Care Giver   Comments lives at [facility]. reports he needs to be independent at [facility] ADLs, staff supply meals, med management   Prior Level of ADL Function   Self Feeding Independent   Grooming / Hygiene Independent   Bathing Independent   Dressing  Independent   Toileting Independent   Prior Level of IADL Function   Medication Management Requires Assist   Laundry Independent   Kitchen Mobility Independent   Finances Requires Assist   Home Management Requires Assist   Shopping Requires Assist   Prior Level Of Mobility Independent Without Device in Home   Driving / Transportation Utilizes Public Transportation   Precautions   Precautions Fall Risk   Comments seizures, psych history   Vitals   O2 Delivery Device None - Room Air   Pain 0 - 10 Group   Therapist Pain Assessment Post Activity Pain Same as Prior to Activity;Nurse Notified;0   Cognition    Level of Consciousness Alert   Passive ROM Upper Body   Passive ROM Upper Body WDL   Active ROM Upper Body   Active ROM Upper Body  X   Dominant Hand Right   Comments reports no active control of right, but observed to use as a stabiliser and grasp hand/push down to assist with sit to stand   Strength Upper Body   Upper Body Strength  X   Comments left WFL, right, no active motion, resists fat movement against gravity   Sensation Upper Body   Upper Extremity Sensation  WDL   Upper Body Muscle Tone   Upper Body Muscle Tone  WDL   Coordination Upper Body   Coordination X   Comments left WFL, right impaired   Balance Assessment   Sitting Balance (Static) Fair   Sitting Balance (Dynamic) Fair -   Standing Balance (Static) Poor -   Standing Balance (Dynamic) Trace +   Weight Shift Sitting Fair   Weight Shift Standing Poor   Comments HHA x 2 for transfers   Bed Mobility    Supine to Sit Minimal Assist   Sit to Supine   (left seated in bedside chair)   Scooting Supervised   ADL Assessment   Eating Independent   Grooming Supervision  (uses left hand)   Bathing   (NT)   Upper Body Dressing Minimal Assist   Lower Body Dressing Maximal Assist   Toileting Moderate Assist   How much help from another person does the patient currently need...   Putting on and taking off regular lower body clothing? 2   Bathing (including washing,  rinsing, and drying)? 2   Toileting, which includes using a toilet, bedpan, or urinal? 2   Putting on and taking off regular upper body clothing? 3   Taking care of personal grooming such as brushing teeth? 3   Eating meals? 3   6 Clicks Daily Activity Score 15   Functional Mobility   Sit to Stand Maximal Assist   Bed, Chair, Wheelchair Transfer Maximal Assist   Toilet Transfers Maximal Assist   Transfer Method Stand Pivot   Visual Perception   Visual Perception  WDL   Comments with glasses   Patient / Family Goals   Patient / Family Goal #1 none stated   Short Term Goals   Short Term Goal # 1 SBA necessary functional transfers   Short Term Goal # 2 supervised level for toileting tasks   Short Term Goal # 3 parity of UE strength and function   Education Group   Role of Occupational Therapist Patient Response Patient;Acceptance;Explanation;Demonstration;Verbal Demonstration;Reinforcement Needed   Problem List   Problem List Decreased Active Daily Living Skills;Decreased Upper Extremity Strength Right;Decreased Upper Extremity AROM Right;Decreased Functional Mobility;Decreased Activity Tolerance   Anticipated Discharge Equipment and Recommendations   DC Equipment Recommendations Unable to determine at this time   Discharge Recommendations Recommend post-acute placement for additional occupational therapy services prior to discharge home   Interdisciplinary Plan of Care Collaboration   IDT Collaboration with  Nursing;Physical Therapist;Physician   Patient Position at End of Therapy Seated;Chair Alarm On;Call Light within Reach;Tray Table within Reach;Phone within Reach   Collaboration Comments report given   Session Information   Date / Session Number  10/18,1(1/3,10/24)   Priority 2

## 2020-10-18 NOTE — CARE PLAN
Problem: Safety  Goal: Will remain free from injury  Outcome: PROGRESSING AS EXPECTED  Goal: Will remain free from falls  Outcome: PROGRESSING AS EXPECTED  Intervention: Assess risk factors for falls  Note: Pt is at high risk for falls.   Intervention: Implement fall precautions  Note: Bed alarm, treaded slipper socks and call light in place.        Problem: Safety:  Goal: Will remain free from injury  Outcome: PROGRESSING AS EXPECTED

## 2020-10-18 NOTE — THERAPY
Physical Therapy   Initial Evaluation     Patient Name: Norm Prabhakar  Age:  40 y.o., Sex:  male  Medical Record #: [id number]  Today's Date: 10/18/2020     Precautions: Fall Risk  Comments: seizures    Assessment  Patient is 40 y.o. male admitted with acute focal neurological deficits including right sided weakness.  Imaging cleared with no evidence of acute abnormality. PMH includes DM2, seizures, conversion disorder. Pt has had multiple admission for similar symptoms this years with no CVA or intracranial abnormalities. Pt resides at [facility] and reports he has to be independent with mobility to return. Pt was ambulatory prior with no AD and able to negotiate FOS to get to second story bedroom. At initial PT eval, pt with 0/5 R LE strength on formal assessment which was not consistent with functional assessment. Pt required min assist for bed mobility, max assist for STS and max assist for bed>chair. Pt able to weight bear through R LE with flexed R knee without buckling during transfer. Pt will cont working with pt in acute care setting to address deficits including weakness, balance, and decreased activity tolerance.     Plan    Recommend Physical Therapy 4 times per week until therapy goals are met for the following treatments:  Bed Mobility, Community Re-integration, Gait Training, Neuro Re-Education / Balance, Self Care/Home Evaluation, Stair Training, Therapeutic Activities and Therapeutic Exercises    DC Equipment Recommendations: Unable to determine at this time  Discharge Recommendations: Recommend post-acute placement for additional physical therapy services prior to discharge home          10/18/20 0816   Prior Living Situation   Prior Services Other (Comments)  (lives at [facility] psych facility)   Housing / Facility 2 Story Apartment / Condo   Steps Into Home 0   Steps In Home   (FOS)   Elevator No   Equipment Owned None   Lives with - Patient's Self Care Capacity Attendant / Paid Care Giver    Comments pt lives at [facility] which appears to be an inpatient psych residence. Pt lives on the second floor with no elevator. Pt reproted he has to be independent but staff assists with cooking and med management   Prior Level of Functional Mobility   Bed Mobility Independent   Transfer Status Independent   Ambulation Independent   Distance Ambulation (Feet)   (community)   Assistive Devices Used None   Stairs Independent   Comments independent prior   Cognition    Level of Consciousness Alert   Comments studders when communicating with therapist   Active ROM Lower Body    Active ROM Lower Body  X   Comments R sided weakness   Strength Lower Body   Lower Body Strength  X   Comments R sided 0/5 with assessment but pt able to weightbear on flexed knee when transfering   Coordination Lower Body    Coordination Lower Body  WDL   Comments functional assessment   Balance Assessment   Sitting Balance (Static) Fair   Sitting Balance (Dynamic) Fair -   Standing Balance (Static) Poor -   Standing Balance (Dynamic) Trace +   Weight Shift Sitting Fair   Weight Shift Standing Poor   Comments HHA x2 for transfer   Gait Analysis   Gait Level Of Assist Unable to Participate   Weight Bearing Status fwb   Comments max for transfers   Bed Mobility    Supine to Sit Minimal Assist   Sit to Supine   (in chair)   Scooting Supervised   Functional Mobility   Sit to Stand Maximal Assist   Bed, Chair, Wheelchair Transfer Maximal Assist   Transfer Method Stand Pivot   Mobility bed mob, STS, bed>chair   Comments cues for sequencing   Short Term Goals    Short Term Goal # 1 Pt will be able to transfer supine<>sitting with SPV in 6tx in order to return to baseline   Short Term Goal # 2 Pt will be able to complete STS with LRAD and SPV in 6tx in order to return to prior level   Short Term Goal # 3 Pt will be able to ambulate 25ft with LRAD and min assist in 6tx in order to progress back to prior level   Short Term Goal # 4 Pt will be able to  negotiate 3 steps with min assist in 6tx in order to progress back to FOS needed to return to housing   Anticipated Discharge Equipment and Recommendations   DC Equipment Recommendations Unable to determine at this time   Discharge Recommendations Recommend post-acute placement for additional physical therapy services prior to discharge home

## 2020-10-19 LAB
GLUCOSE BLD-MCNC: 140 MG/DL (ref 65–99)
GLUCOSE BLD-MCNC: 145 MG/DL (ref 65–99)
GLUCOSE BLD-MCNC: 149 MG/DL (ref 65–99)
GLUCOSE BLD-MCNC: 165 MG/DL (ref 65–99)

## 2020-10-19 PROCEDURE — 700111 HCHG RX REV CODE 636 W/ 250 OVERRIDE (IP): Performed by: INTERNAL MEDICINE

## 2020-10-19 PROCEDURE — 770006 HCHG ROOM/CARE - MED/SURG/GYN SEMI*

## 2020-10-19 PROCEDURE — A9270 NON-COVERED ITEM OR SERVICE: HCPCS | Performed by: STUDENT IN AN ORGANIZED HEALTH CARE EDUCATION/TRAINING PROGRAM

## 2020-10-19 PROCEDURE — 700102 HCHG RX REV CODE 250 W/ 637 OVERRIDE(OP): Performed by: STUDENT IN AN ORGANIZED HEALTH CARE EDUCATION/TRAINING PROGRAM

## 2020-10-19 PROCEDURE — 92526 ORAL FUNCTION THERAPY: CPT

## 2020-10-19 PROCEDURE — 99231 SBSQ HOSP IP/OBS SF/LOW 25: CPT | Performed by: STUDENT IN AN ORGANIZED HEALTH CARE EDUCATION/TRAINING PROGRAM

## 2020-10-19 PROCEDURE — 82962 GLUCOSE BLOOD TEST: CPT | Mod: 91

## 2020-10-19 RX ADMIN — FENOFIBRATE 134 MG: 134 CAPSULE ORAL at 17:50

## 2020-10-19 RX ADMIN — BUSPIRONE HYDROCHLORIDE 10 MG: 10 TABLET ORAL at 17:50

## 2020-10-19 RX ADMIN — BUSPIRONE HYDROCHLORIDE 10 MG: 10 TABLET ORAL at 13:05

## 2020-10-19 RX ADMIN — DIVALPROEX SODIUM 500 MG: 500 TABLET, DELAYED RELEASE ORAL at 04:29

## 2020-10-19 RX ADMIN — LEVOTHYROXINE SODIUM 200 MCG: 0.2 TABLET ORAL at 04:29

## 2020-10-19 RX ADMIN — DIVALPROEX SODIUM 1500 MG: 500 TABLET, DELAYED RELEASE ORAL at 17:50

## 2020-10-19 RX ADMIN — LURASIDONE HYDROCHLORIDE 20 MG: 20 TABLET, FILM COATED ORAL at 21:07

## 2020-10-19 RX ADMIN — LEVOTHYROXINE SODIUM 25 MCG: 25 TABLET ORAL at 04:29

## 2020-10-19 RX ADMIN — BUDESONIDE AND FORMOTEROL FUMARATE DIHYDRATE 2 PUFF: 160; 4.5 AEROSOL RESPIRATORY (INHALATION) at 17:50

## 2020-10-19 RX ADMIN — BUSPIRONE HYDROCHLORIDE 10 MG: 10 TABLET ORAL at 04:29

## 2020-10-19 RX ADMIN — SERTRALINE HYDROCHLORIDE 100 MG: 100 TABLET ORAL at 04:29

## 2020-10-19 RX ADMIN — TOPIRAMATE 25 MG: 25 TABLET, FILM COATED ORAL at 04:28

## 2020-10-19 RX ADMIN — ENOXAPARIN SODIUM 40 MG: 40 INJECTION SUBCUTANEOUS at 04:30

## 2020-10-19 RX ADMIN — BUDESONIDE AND FORMOTEROL FUMARATE DIHYDRATE 2 PUFF: 160; 4.5 AEROSOL RESPIRATORY (INHALATION) at 04:30

## 2020-10-19 RX ADMIN — ASPIRIN 81 MG: 81 TABLET, COATED ORAL at 04:29

## 2020-10-19 RX ADMIN — MONTELUKAST 10 MG: 10 TABLET, FILM COATED ORAL at 17:50

## 2020-10-19 RX ADMIN — ATORVASTATIN CALCIUM 80 MG: 80 TABLET, FILM COATED ORAL at 17:50

## 2020-10-19 ASSESSMENT — ENCOUNTER SYMPTOMS
COUGH: 0
HEADACHES: 1
WHEEZING: 0
DIARRHEA: 0
DOUBLE VISION: 0
SENSORY CHANGE: 1
BLURRED VISION: 0
VOMITING: 0
DIZZINESS: 1
SPEECH CHANGE: 1
SHORTNESS OF BREATH: 0
SORE THROAT: 0
FOCAL WEAKNESS: 0
CONSTIPATION: 0
NAUSEA: 1
NERVOUS/ANXIOUS: 0
MYALGIAS: 0
ABDOMINAL PAIN: 0
FEVER: 0
WEAKNESS: 1
SINUS PAIN: 0
CHILLS: 0
PALPITATIONS: 0
SPUTUM PRODUCTION: 0

## 2020-10-19 ASSESSMENT — PATIENT HEALTH QUESTIONNAIRE - PHQ9
1. LITTLE INTEREST OR PLEASURE IN DOING THINGS: NOT AT ALL
SUM OF ALL RESPONSES TO PHQ9 QUESTIONS 1 AND 2: 1
2. FEELING DOWN, DEPRESSED, IRRITABLE, OR HOPELESS: SEVERAL DAYS

## 2020-10-19 NOTE — CARE PLAN
Problem: Communication  Goal: The ability to communicate needs accurately and effectively will improve  Outcome: PROGRESSING AS EXPECTED  Note: Patient A&Ox4, able to make needs known and using call light appropriately for assistance. Call light within reach; hourly rounding in place.     Problem: Safety  Goal: Will remain free from injury  Outcome: PROGRESSING AS EXPECTED  Note: Patient encouraged to use call light prior to ambulating to avoid injury/ falls. Patient expressing understanding and using call light appropriately for assistance. Bed locked and in lowest position, bed alarm on. Call light and belongings within reach. Hourly rounding in place.

## 2020-10-19 NOTE — CARE PLAN
Problem: Safety  Goal: Will remain free from injury  Note: Pt at risk for falls due to stroke. Bed locked and in lowest position. Call light and personal belongings in reach. Bed alarm in place. Hourly rounding.       Problem: Venous Thromboembolism (VTW)/Deep Vein Thrombosis (DVT) Prevention:  Goal: Patient will participate in Venous Thrombosis (VTE)/Deep Vein Thrombosis (DVT)Prevention Measures  Note: Pt at risk for DVT due to stroke. Pt currently refusing SCDs, will continue to provide and reinforce education regarding use. Lovenox for DVT prophylaxis. Encouraging mobilization as tolerated.

## 2020-10-19 NOTE — PROGRESS NOTES
Hospital Medicine Daily Progress Note    Date of Service  10/19/2020    Chief Complaint  Headache and recurrent right-sided weakness     Hospital Course  40 y.o. male with Medical history of type 2 diabetes mellitus, seizure disorder, conversion disorder admitted 10/16/2020 with headache and recurrent right-sided weakness in his upper and lower extremities.  Patient is a current resident of [facility].  Patient has multiple admissions this year for similar symptoms and work-up done she will no CVA or no intracranial abnormality.  Neurology consulted recommended IV Benadryl as part of the headache cocktail.  CT head, CTA head and neck, CT cerebral perfusion and MRI brain showed no acute abnormality.   Psychiatry  consulted , patient was seen by Sarah several times the past, with numerous recommendations for  psychotherapy/CBT for Conversion disorder. Psychiatry recommends referral back to [facility] for further outpatient psychiatric treatment.  Psychiatry and neurology signed off.  PT/OT recommended SNF.     Interval Problem Update  No acute event overnight.  Denied Headache.   PT/OT recommended SNF.     Consultants/Specialty  Neurology  Psychiatry    Code Status  Full Code    Disposition  Pending SNF placement    Review of Systems  Review of Systems   Constitutional: Positive for malaise/fatigue. Negative for chills and fever.   HENT: Negative for congestion, ear discharge, ear pain, sinus pain and sore throat.    Eyes: Negative for blurred vision and double vision.   Respiratory: Negative for cough, sputum production, shortness of breath and wheezing.    Cardiovascular: Negative for chest pain, palpitations and leg swelling.   Gastrointestinal: Positive for nausea. Negative for abdominal pain, constipation, diarrhea and vomiting.   Genitourinary: Negative for dysuria, frequency and urgency.   Musculoskeletal: Negative for myalgias.   Neurological: Positive for dizziness, sensory change, speech change, weakness and  headaches. Negative for focal weakness.   Psychiatric/Behavioral: The patient is not nervous/anxious.         Physical Exam  Temp:  [35.8 °C (96.5 °F)-36.3 °C (97.4 °F)] 36.3 °C (97.4 °F)  Pulse:  [54-69] 69  Resp:  [16-20] 18  BP: ()/(54-69) 112/69  SpO2:  [92 %-98 %] 98 %    Physical Exam  Constitutional:       General: He is not in acute distress.     Appearance: He is obese.   HENT:      Head: Normocephalic and atraumatic.      Nose: Nose normal.      Mouth/Throat:      Mouth: Mucous membranes are moist.      Pharynx: No posterior oropharyngeal erythema.   Eyes:      General: No scleral icterus.     Extraocular Movements: Extraocular movements intact.      Conjunctiva/sclera: Conjunctivae normal.      Pupils: Pupils are equal, round, and reactive to light.   Neck:      Musculoskeletal: Normal range of motion and neck supple. No neck rigidity.   Cardiovascular:      Rate and Rhythm: Normal rate and regular rhythm.      Pulses: Normal pulses.      Heart sounds: Normal heart sounds. No murmur. No gallop.    Pulmonary:      Effort: Pulmonary effort is normal.      Breath sounds: Normal breath sounds. No stridor. No wheezing, rhonchi or rales.   Abdominal:      General: Bowel sounds are normal.      Palpations: Abdomen is soft.   Musculoskeletal:         General: No swelling or tenderness.   Skin:     General: Skin is warm.   Neurological:      Mental Status: He is alert.      Motor: Weakness present.      Comments: Motor 1/5 in right upper and lower extremities  Sensation slightly reduced in right upper and lower extremities   Psychiatric:         Mood and Affect: Affect is flat.         Speech: Speech is delayed.         Behavior: Behavior is slowed.         Cognition and Memory: Memory is impaired.         Fluids    Intake/Output Summary (Last 24 hours) at 10/19/2020 1015  Last data filed at 10/19/2020 0900  Gross per 24 hour   Intake 240 ml   Output 2500 ml   Net -2260 ml       Laboratory  Recent Labs      10/16/20  2016   WBC 8.5   RBC 4.16*   HEMOGLOBIN 12.2*   HEMATOCRIT 37.6*   MCV 90.4   MCH 29.3   MCHC 32.4*   RDW 48.6   PLATELETCT 273   MPV 10.0     Recent Labs     10/16/20  2016   SODIUM 133*   POTASSIUM 4.0   CHLORIDE 97   CO2 21   GLUCOSE 191*   BUN 30*   CREATININE 1.01   CALCIUM 9.2     Recent Labs     10/16/20  2016   APTT 27.0   INR 0.97               Imaging  MR-BRAIN-W/O   Final Result      1.  No evidence of acute territorial infarct, intracranial hemorrhage or mass lesion.   2.  Mild diffuse cerebral substance loss.   3.  Redemonstrated diffuse confluent T2 and FLAIR signal hyperintensity in the subcortical and periventricular white matter of the bilateral cerebral hemispheres with unchanged differential consideration of dysmyelination or demyelination versus remote    toxic insult.      DX-CHEST-PORTABLE (1 VIEW)   Final Result      Hypoinflation without acute cardiopulmonary abnormality.      CT-CEREBRAL PERFUSION ANALYSIS   Final Result      1.  Cerebral blood flow less than 30% likely representing completed infarct = 0 mL.      2.  T Max more than 6 seconds likely representing combination of completed infarct and ischemia = 0 mL.      3.  Mismatched volume likely representing ischemic brain/penumbra = None      4.  Please note that the cerebral perfusion was performed on the limited brain tissue around the basal ganglia region. Infarct/ischemia outside the CT perfusion sections can be missed in this study.      CT-CTA NECK WITH & W/O-POST PROCESSING   Final Result      No significant stenosis or dissection of the neck arteries      CT-CTA HEAD WITH & W/O-POST PROCESS   Final Result      CT angiogram of the [place] of [place] within normal limits.      CT-HEAD W/O   Final Result      1.  Stable severe nonspecific supratentorial white matter disease.   2.  No acute intracranial abnormality.           Assessment/Plan  Acute right-sided weakness- (present on admission)  Assessment & Plan  Recurrent.   Patient has history of conversion.   On ASA, statin.  CT head and MRI brain no acute  Neurology signed off  Psychiatry recommend referral back to [facility] for outpatient psychiatric treatment  Fall precaution  PT/OT recommend SNF  Pending SNF placement    Seizure disorder (HCC)  Assessment & Plan  Continue home seizure medication  Fall/seizure precaution    Type 2 diabetes mellitus with hyperglycemia, without long-term current use of insulin (HCC)- (present on admission)  Assessment & Plan  Poorly controlled with last A1c 9.5 in 6/20.  Hold oral glycemic agents.  Insulin sliding scale, Accu-Cheks, hypoglycemia protocol.    Headache- (present on admission)  Assessment & Plan  S/p Headache cocktail per neurology.   Resolved     Psychiatric problem- (present on admission)  Assessment & Plan  Bipolar disorder, Depression, PTSD.  Continue home medications.       VTE prophylaxis: Lovenox

## 2020-10-19 NOTE — THERAPY
Speech Language Pathology  Daily Treatment     Patient Name: Norm Prabhakar  Age:  40 y.o., Sex:  male  Medical Record #: [id number]  Today's Date: 10/19/2020     Precautions  Precautions: (P) Fall Risk  Comments: (P) Sz, psych hx    Assessment    Pt seen for dysphagia f/u at the request of his primary RN: Pt presents with mild confusion, AAO x 3, with an underbite noted.  Pt seen for upgraded trial of dry solids and tolerates without s/s of aspiration, with adequate chew. Pt is also tolerating successive sips of thin liquids without difficulty.  Pt continues to endorse RUE weakness but will request help with cutting 'regular solids' if needed.  Pt is able to verbalize and demonstrate appropriate rate of self feeding and correct bolus size.  Dysphagia goals met, will d/c SLP at this time.    Plan    Discharge secondary to goals met.  SLP evaluation completed.  Patient is not being actively followed for therapy services at this time, however may be seen if requested by attending provider for 1 more visit within 30 days to address any discharge needs or if the patient has a change in status.  Pt was seen for dysphagia only      Discharge Recommendations: (P) Anticipate that the patient will have no further speech therapy needs after discharge from the hospital     Objective       10/19/20 1349   Voice   Comments Clear   Dysphagia    Dysphagia WDL   Diet / Liquid Recommendation Regular - Easy to Chew (7);Thin (0)   Nutritional Liquid Intake Rating Scale Non thickened beverages   Nutritional Food Intake Rating Scale Total oral diet with no restrictions   Skilled Intervention Verbal Cueing   Comments Pt reports he will ask for help cutting foods if needed due to RUE weakness.  Underbite, fxnl chew.   Recommended Route of Medication Administration   Medication Administration  Whole with Liquid Wash   Patient / Family Goals   Patient / Family Goal #1 I want regular food   Short Term Goals   Short Term Goal # 1 Pt will  consume SB6/TN0 diet with no overt s/sx concerning for penetration/aspiration.   Goal Outcome # 1 Goal met, new goal added   Short Term Goal # 1 B  Pt will independently consume a regular diet withot s/s of dysphagia    Goal Outcome  # 1 B Progressing as expected   Education Group   Additional Comments answers strategy questions and demonstrates slow rate of feeding and small bolus size.   Interdisciplinary Plan of Care Collaboration   IDT Collaboration with  Nursing   Patient Position at End of Therapy Seated;In Bed;Call Light within Reach;Tray Table within Reach;Phone within Reach   Collaboration Comments upgraded to reg diet without s/s of dypshagia; pt reports he will ask for help if needed   Session Information   Date / Session Number 2, 10/19/20 (2/2-10/24 CW)   Priority 0  (2/2. Diet up to reg w/ assist prn to cut foods.Sw goals met)
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
## 2020-10-19 NOTE — DISCHARGE PLANNING
[facility] Transitional Care Coordination     Referral from:  Dr. Thomas  Facesheet indicates:  Medicaid FFS  Potential Rehab Diagnosis:      Chart review indicates patient has limited on going medical management, has therapy needs     D/C support: [facility]?     Physiatry consultation denied per protocol.  Current documentation does not demonstrate diagnosis meeting CMS criteria for IRF level care.   Anticipate skilled nursing vs home with outpatient support for post acute transitional care.        Thank you for the referral.
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
## 2020-10-20 LAB
GLUCOSE BLD-MCNC: 160 MG/DL (ref 65–99)
GLUCOSE BLD-MCNC: 161 MG/DL (ref 65–99)
GLUCOSE BLD-MCNC: 185 MG/DL (ref 65–99)
GLUCOSE BLD-MCNC: 222 MG/DL (ref 65–99)

## 2020-10-20 PROCEDURE — 82962 GLUCOSE BLOOD TEST: CPT | Mod: 91

## 2020-10-20 PROCEDURE — A9270 NON-COVERED ITEM OR SERVICE: HCPCS | Performed by: INTERNAL MEDICINE

## 2020-10-20 PROCEDURE — 99232 SBSQ HOSP IP/OBS MODERATE 35: CPT | Performed by: INTERNAL MEDICINE

## 2020-10-20 PROCEDURE — 97116 GAIT TRAINING THERAPY: CPT

## 2020-10-20 PROCEDURE — 700102 HCHG RX REV CODE 250 W/ 637 OVERRIDE(OP): Performed by: INTERNAL MEDICINE

## 2020-10-20 PROCEDURE — 700102 HCHG RX REV CODE 250 W/ 637 OVERRIDE(OP): Performed by: STUDENT IN AN ORGANIZED HEALTH CARE EDUCATION/TRAINING PROGRAM

## 2020-10-20 PROCEDURE — A9270 NON-COVERED ITEM OR SERVICE: HCPCS | Performed by: STUDENT IN AN ORGANIZED HEALTH CARE EDUCATION/TRAINING PROGRAM

## 2020-10-20 PROCEDURE — 770006 HCHG ROOM/CARE - MED/SURG/GYN SEMI*

## 2020-10-20 PROCEDURE — 700111 HCHG RX REV CODE 636 W/ 250 OVERRIDE (IP): Performed by: INTERNAL MEDICINE

## 2020-10-20 PROCEDURE — 97530 THERAPEUTIC ACTIVITIES: CPT

## 2020-10-20 RX ADMIN — LURASIDONE HYDROCHLORIDE 20 MG: 20 TABLET, FILM COATED ORAL at 20:58

## 2020-10-20 RX ADMIN — BUDESONIDE AND FORMOTEROL FUMARATE DIHYDRATE 2 PUFF: 160; 4.5 AEROSOL RESPIRATORY (INHALATION) at 17:10

## 2020-10-20 RX ADMIN — LEVOTHYROXINE SODIUM 25 MCG: 25 TABLET ORAL at 06:14

## 2020-10-20 RX ADMIN — BUSPIRONE HYDROCHLORIDE 10 MG: 10 TABLET ORAL at 17:04

## 2020-10-20 RX ADMIN — ACETAMINOPHEN 650 MG: 325 TABLET, FILM COATED ORAL at 12:42

## 2020-10-20 RX ADMIN — BUSPIRONE HYDROCHLORIDE 10 MG: 10 TABLET ORAL at 12:37

## 2020-10-20 RX ADMIN — DOCUSATE SODIUM 50 MG AND SENNOSIDES 8.6 MG 2 TABLET: 8.6; 5 TABLET, FILM COATED ORAL at 17:04

## 2020-10-20 RX ADMIN — LEVOTHYROXINE SODIUM 200 MCG: 0.2 TABLET ORAL at 06:14

## 2020-10-20 RX ADMIN — FENOFIBRATE 134 MG: 134 CAPSULE ORAL at 17:04

## 2020-10-20 RX ADMIN — TOPIRAMATE 25 MG: 25 TABLET, FILM COATED ORAL at 05:54

## 2020-10-20 RX ADMIN — BUDESONIDE AND FORMOTEROL FUMARATE DIHYDRATE 2 PUFF: 160; 4.5 AEROSOL RESPIRATORY (INHALATION) at 05:57

## 2020-10-20 RX ADMIN — MONTELUKAST 10 MG: 10 TABLET, FILM COATED ORAL at 17:04

## 2020-10-20 RX ADMIN — ATORVASTATIN CALCIUM 80 MG: 80 TABLET, FILM COATED ORAL at 17:04

## 2020-10-20 RX ADMIN — DIVALPROEX SODIUM 1500 MG: 500 TABLET, DELAYED RELEASE ORAL at 17:04

## 2020-10-20 RX ADMIN — DIVALPROEX SODIUM 500 MG: 500 TABLET, DELAYED RELEASE ORAL at 05:53

## 2020-10-20 RX ADMIN — ASPIRIN 81 MG: 81 TABLET, COATED ORAL at 05:53

## 2020-10-20 RX ADMIN — ENOXAPARIN SODIUM 40 MG: 40 INJECTION SUBCUTANEOUS at 05:55

## 2020-10-20 RX ADMIN — DOCUSATE SODIUM 50 MG AND SENNOSIDES 8.6 MG 2 TABLET: 8.6; 5 TABLET, FILM COATED ORAL at 05:55

## 2020-10-20 RX ADMIN — SERTRALINE HYDROCHLORIDE 100 MG: 100 TABLET ORAL at 05:53

## 2020-10-20 RX ADMIN — LISINOPRIL 10 MG: 10 TABLET ORAL at 05:54

## 2020-10-20 RX ADMIN — TRAZODONE HYDROCHLORIDE 100 MG: 100 TABLET ORAL at 20:58

## 2020-10-20 RX ADMIN — ACETAMINOPHEN 650 MG: 325 TABLET, FILM COATED ORAL at 20:58

## 2020-10-20 RX ADMIN — BUSPIRONE HYDROCHLORIDE 10 MG: 10 TABLET ORAL at 05:53

## 2020-10-20 ASSESSMENT — ENCOUNTER SYMPTOMS
PALPITATIONS: 0
ABDOMINAL PAIN: 0
CONSTIPATION: 0
SINUS PAIN: 0
SHORTNESS OF BREATH: 0
WEAKNESS: 1
FEVER: 0
DIZZINESS: 1
HEADACHES: 1
SPUTUM PRODUCTION: 0
CHILLS: 0
FOCAL WEAKNESS: 0
NAUSEA: 1
VOMITING: 0
MYALGIAS: 0
DOUBLE VISION: 0
SENSORY CHANGE: 1
COUGH: 0
SPEECH CHANGE: 1
DIARRHEA: 0

## 2020-10-20 ASSESSMENT — COGNITIVE AND FUNCTIONAL STATUS - GENERAL
MOVING TO AND FROM BED TO CHAIR: UNABLE
TURNING FROM BACK TO SIDE WHILE IN FLAT BAD: UNABLE
MOBILITY SCORE: 9
CLIMB 3 TO 5 STEPS WITH RAILING: TOTAL
WALKING IN HOSPITAL ROOM: A LOT
MOVING FROM LYING ON BACK TO SITTING ON SIDE OF FLAT BED: UNABLE
STANDING UP FROM CHAIR USING ARMS: A LITTLE
SUGGESTED CMS G CODE MODIFIER MOBILITY: CM

## 2020-10-20 ASSESSMENT — PAIN DESCRIPTION - PAIN TYPE: TYPE: ACUTE PAIN

## 2020-10-20 ASSESSMENT — GAIT ASSESSMENTS
DEVIATION: DECREASED BASE OF SUPPORT;DECREASED HEEL STRIKE;DECREASED TOE OFF
GAIT LEVEL OF ASSIST: MODERATE ASSIST
ASSISTIVE DEVICE: HAND HELD ASSIST
DISTANCE (FEET): 2

## 2020-10-20 NOTE — PROGRESS NOTES
Hospital Medicine Daily Progress Note    Date of Service  10/20/2020    Chief Complaint  Headache and recurrent right-sided weakness     Hospital Course  40 y.o. male with Medical history of type 2 diabetes mellitus, seizure disorder, conversion disorder admitted 10/16/2020 with headache and recurrent right-sided weakness in his upper and lower extremities.  Patient is a current resident of [facility].  Patient has multiple admissions this year for similar symptoms and work-up done she will no CVA or no intracranial abnormality.  Neurology consulted recommended IV Benadryl as part of the headache cocktail.  CT head, CTA head and neck, CT cerebral perfusion and MRI brain showed no acute abnormality.   Psychiatry  consulted , patient was seen by Sarah several times the past, with numerous recommendations for  psychotherapy/CBT for Conversion disorder. Psychiatry recommends referral back to [facility] for further outpatient psychiatric treatment.  Psychiatry and neurology signed off.  PT/OT recommended SNF.     Interval Problem Update  The patient stated that he can move his right finger a little bit today.  PT/OT recommended SNF.     Consultants/Specialty  Neurology  Psychiatry    Code Status  Full Code    Disposition  Pending SNF placement    Review of Systems  Review of Systems   Constitutional: Positive for malaise/fatigue. Negative for chills and fever.   HENT: Negative for congestion, ear discharge, ear pain and sinus pain.    Eyes: Negative for double vision.   Respiratory: Negative for cough, sputum production and shortness of breath.    Cardiovascular: Negative for chest pain, palpitations and leg swelling.   Gastrointestinal: Positive for nausea. Negative for abdominal pain, constipation, diarrhea and vomiting.   Genitourinary: Negative for frequency.   Musculoskeletal: Negative for myalgias.   Neurological: Positive for dizziness, sensory change, speech change, weakness and headaches. Negative for focal  weakness.        Physical Exam  Temp:  [35.8 °C (96.5 °F)-36.7 °C (98 °F)] 36.7 °C (98 °F)  Pulse:  [60-68] 60  Resp:  [16-20] 16  BP: (105-123)/(63-79) 110/65  SpO2:  [93 %-96 %] 93 %    Physical Exam  Constitutional:       General: He is not in acute distress.     Appearance: He is obese.   HENT:      Head: Normocephalic and atraumatic.      Nose: Nose normal.      Mouth/Throat:      Mouth: Mucous membranes are moist.      Pharynx: No posterior oropharyngeal erythema.   Eyes:      General: No scleral icterus.     Extraocular Movements: Extraocular movements intact.      Conjunctiva/sclera: Conjunctivae normal.      Pupils: Pupils are equal, round, and reactive to light.   Neck:      Musculoskeletal: Normal range of motion and neck supple. No neck rigidity.   Cardiovascular:      Rate and Rhythm: Normal rate and regular rhythm.      Pulses: Normal pulses.      Heart sounds: Normal heart sounds. No murmur. No gallop.    Pulmonary:      Effort: Pulmonary effort is normal.      Breath sounds: Normal breath sounds. No wheezing.   Abdominal:      General: Bowel sounds are normal.   Musculoskeletal:         General: No swelling.   Skin:     General: Skin is warm.   Neurological:      Mental Status: He is alert.      Motor: Weakness present.      Comments: Motor 1/5 in right upper and lower extremities  Sensation slightly reduced in right upper and lower extremities   Psychiatric:         Mood and Affect: Affect is flat.         Speech: Speech is delayed.         Behavior: Behavior is slowed.         Cognition and Memory: Memory is impaired.         Fluids    Intake/Output Summary (Last 24 hours) at 10/20/2020 0804  Last data filed at 10/19/2020 2227  Gross per 24 hour   Intake 360 ml   Output 1575 ml   Net -1215 ml       Laboratory                        Imaging  MR-BRAIN-W/O   Final Result      1.  No evidence of acute territorial infarct, intracranial hemorrhage or mass lesion.   2.  Mild diffuse cerebral substance  loss.   3.  Redemonstrated diffuse confluent T2 and FLAIR signal hyperintensity in the subcortical and periventricular white matter of the bilateral cerebral hemispheres with unchanged differential consideration of dysmyelination or demyelination versus remote    toxic insult.      DX-CHEST-PORTABLE (1 VIEW)   Final Result      Hypoinflation without acute cardiopulmonary abnormality.      CT-CEREBRAL PERFUSION ANALYSIS   Final Result      1.  Cerebral blood flow less than 30% likely representing completed infarct = 0 mL.      2.  T Max more than 6 seconds likely representing combination of completed infarct and ischemia = 0 mL.      3.  Mismatched volume likely representing ischemic brain/penumbra = None      4.  Please note that the cerebral perfusion was performed on the limited brain tissue around the basal ganglia region. Infarct/ischemia outside the CT perfusion sections can be missed in this study.      CT-CTA NECK WITH & W/O-POST PROCESSING   Final Result      No significant stenosis or dissection of the neck arteries      CT-CTA HEAD WITH & W/O-POST PROCESS   Final Result      CT angiogram of the [place] within normal limits.      CT-HEAD W/O   Final Result      1.  Stable severe nonspecific supratentorial white matter disease.   2.  No acute intracranial abnormality.           Assessment/Plan  Acute right-sided weakness- (present on admission)  Assessment & Plan  Recurrent.  Patient has history of conversion.   On ASA, statin.  CT head and MRI brain no acute  Neurology signed off  Psychiatry recommend referral back to [facility] for outpatient psychiatric treatment  Fall precaution  PT/OT recommend SNF  Pending SNF placement   following    Seizure disorder (HCC)  Assessment & Plan  Continue home seizure medication  Fall/seizure precaution    Type 2 diabetes mellitus with hyperglycemia, without long-term current use of insulin (HCC)- (present on admission)  Assessment & Plan  Poorly  controlled with last A1c 9.5 in 6/20.  Hold oral glycemic agents.  Insulin sliding scale, Accu-Cheks, hypoglycemia protocol.    Headache- (present on admission)  Assessment & Plan  S/p Headache cocktail per neurology.   Resolved     Psychiatric problem- (present on admission)  Assessment & Plan  Bipolar disorder, Depression, PTSD.  Continue home medications.       VTE prophylaxis: Lovenox

## 2020-10-20 NOTE — CARE PLAN
"  Problem: Communication  Goal: The ability to communicate needs accurately and effectively will improve  Outcome: PROGRESSING AS EXPECTED  Note: Pt expresses understanding of calling for help when needing anything and communicating needs. Pt utilizes call light effectively and is able to communicate needs adequately      Problem: Safety  Goal: Will remain free from falls  Outcome: PROGRESSING AS EXPECTED  Note: Pt expresses understanding of use of call light when needing anything. Fall precautions are in place. Bed is locked and low, pt belongings are within reach, call light is within pt reach, adequate lighting ensured, and hourly rounding is implemented.       Problem: Knowledge Deficit  Goal: Knowledge of disease process/condition, treatment plan, diagnostic tests, and medications will improve  Outcome: PROGRESSING AS EXPECTED  Note: Pt expresses a desire to get better and plans to \"really work on getting better\" so that he can get  in November. Pt also expresses understanding of medication uses.            "

## 2020-10-20 NOTE — THERAPY
"Physical Therapy   Daily Treatment     Patient Name: Norm Prabhakar  Age:  40 y.o., Sex:  male  Medical Record #: [id number]  Today's Date: 10/20/2020     Precautions: (P) Fall Risk    Assessment    Pt agreeable to work with PT, reports being up in the chair this AM for breakfast. Pt notes he feels slight improvement in his R hand, but has not noticed any change to the R LE. Today pt required Min - Mod A to complete mobility which is improvement from initial evaluation. Pt continues to note not having any R LE strength to move R LE OOB or perform seated LE exercises; however, he is able to stand with Min A and fully WB on R LE without any knee buckling or collapsing. Observed R foot in slight equinovarus upon standing which was able to be corrected with Min A. Pt demonstrated difficulty initiating side step or fwd step independently with R LE; however, when given slight tactile cue pt was able to minimally shuffle step with R foot. PT will continue to follow while in house.     Plan    Continue current treatment plan.    DC Equipment Recommendations: Unable to determine at this time  Discharge Recommendations:  Recommend post-acute placement for additional physical therapy services prior to discharge home      Subjective    \"My girlfriend tells me I have to work harder to get better before we get . I have a job interview then fly out to [place] to get  at the end of November.\"       10/20/20 1158   Precautions   Precautions Fall Risk   Comments seizure and psych hx   Vitals   O2 Delivery Device None - Room Air   Pain 0 - 10 Group   Therapist Pain Assessment Nurse Notified  (no pain reported)   Cognition    Level of Consciousness Alert   Comments pleasant and cooperative with PT. reports impoved movement in R hand but no change in R LE   Balance   Sitting Balance (Static) Fair +   Sitting Balance (Dynamic) Fair   Standing Balance (Static) Poor +   Standing Balance (Dynamic) Poor   Weight Shift Sitting Fair " "  Weight Shift Standing Poor   Skilled Intervention Compensatory Strategies;Verbal Cuing   Comments w/ PT HHA   Gait Analysis   Gait Level Of Assist Moderate Assist   Assistive Device Hand Held Assist   Distance (Feet) 2   # of Times Distance was Traveled 1   Deviation Decreased Base Of Support;Decreased Heel Strike;Decreased Toe Off   # of Stairs Climbed 0   Weight Bearing Status no restrictions   Skilled Intervention Verbal Cuing;Sequencing;Compensatory Strategies;Postural Facilitation   Comments pt able to complete small side shuffled steps to chair. Demonstrated fair lateral weight shiting with no evidence on R knee buckling under full weight of body to lift L LE to step   Bed Mobility    Supine to Sit Moderate Assist  (with log roll to the R)   Sit to Supine   (seated in chair at end of session)   Scooting Supervised   Skilled Intervention Verbal Cuing;Compensatory Strategies   Functional Mobility   Sit to Stand Minimal Assist   Bed, Chair, Wheelchair Transfer Moderate Assist   Transfer Method Stand Step   Patient / Family Goals    Patient / Family Goal #1 \"To get better for my job interview and wedding in November\"   Short Term Goals    Short Term Goal # 1 Pt will be able to transfer supine<>sitting with SPV in 6tx in order to return to baseline   Goal Outcome # 1 goal not met   Short Term Goal # 2 Pt will be able to complete STS with LRAD and SPV in 6tx in order to return to prior level   Goal Outcome # 2 Goal not met   Short Term Goal # 3 Pt will be able to ambulate 25ft with LRAD and min assist in 6tx in order to progress back to prior level   Goal Outcome # 3 Goal not met   Short Term Goal # 4 Pt will be able to negotiate 3 steps with min assist in 6tx in order to progress back to FOS needed to return to housing   Goal Outcome # 4 Goal not met   Anticipated Discharge Equipment and Recommendations   DC Equipment Recommendations Unable to determine at this time   Discharge Recommendations Recommend " post-acute placement for additional physical therapy services prior to discharge home

## 2020-10-20 NOTE — CARE PLAN
Problem: Safety  Goal: Will remain free from falls  Outcome: PROGRESSING AS EXPECTED  Intervention: Implement fall precautions  Note: Bed alarm is in place, pt educated to call for assistance.   Will order large treaded socks       Problem: Mobility:  Goal: Mobility will improve  Outcome: PROGRESSING SLOWER THAN EXPECTED  Intervention: Encourage mobilization to extent of ability  Note: Pt encouraged to mobilize and sit up in chair for meals. Up for breakfast with 2 assist stand and pivot.   Pt encouraged to move fingers on right hand to increase strength.

## 2020-10-20 NOTE — DISCHARGE PLANNING
Anticipated Discharge Disposition: TBD, SNF vs Group home    Action: CM placed call to pt's mother Leslie to obtain SNF choice.  VM left.    Barriers to Discharge:   SNF placement  Payor source-Medicaid    Plan: Care coordination to follow up with pt's mother to obtain SNF choice.

## 2020-10-20 NOTE — PROGRESS NOTES
Pt up to chair for breakfast with 2 assist hand held. Pt tolerated mobilization well. Spoke with Dr. Pacheco about diet. Will upgrade to regular diet per SLP evaluation.     Pt declines call to family as he states he keeps them updated.

## 2020-10-21 LAB
GLUCOSE BLD-MCNC: 137 MG/DL (ref 65–99)
GLUCOSE BLD-MCNC: 170 MG/DL (ref 65–99)
GLUCOSE BLD-MCNC: 198 MG/DL (ref 65–99)
GLUCOSE BLD-MCNC: 207 MG/DL (ref 65–99)

## 2020-10-21 PROCEDURE — 700102 HCHG RX REV CODE 250 W/ 637 OVERRIDE(OP): Performed by: STUDENT IN AN ORGANIZED HEALTH CARE EDUCATION/TRAINING PROGRAM

## 2020-10-21 PROCEDURE — 700102 HCHG RX REV CODE 250 W/ 637 OVERRIDE(OP): Performed by: INTERNAL MEDICINE

## 2020-10-21 PROCEDURE — A9270 NON-COVERED ITEM OR SERVICE: HCPCS | Performed by: STUDENT IN AN ORGANIZED HEALTH CARE EDUCATION/TRAINING PROGRAM

## 2020-10-21 PROCEDURE — 92523 SPEECH SOUND LANG COMPREHEN: CPT

## 2020-10-21 PROCEDURE — 99232 SBSQ HOSP IP/OBS MODERATE 35: CPT | Performed by: INTERNAL MEDICINE

## 2020-10-21 PROCEDURE — 770006 HCHG ROOM/CARE - MED/SURG/GYN SEMI*

## 2020-10-21 PROCEDURE — A9270 NON-COVERED ITEM OR SERVICE: HCPCS | Performed by: INTERNAL MEDICINE

## 2020-10-21 PROCEDURE — 82962 GLUCOSE BLOOD TEST: CPT

## 2020-10-21 PROCEDURE — 700111 HCHG RX REV CODE 636 W/ 250 OVERRIDE (IP): Performed by: INTERNAL MEDICINE

## 2020-10-21 RX ADMIN — FENOFIBRATE 134 MG: 134 CAPSULE ORAL at 16:46

## 2020-10-21 RX ADMIN — ACETAMINOPHEN 650 MG: 325 TABLET, FILM COATED ORAL at 15:42

## 2020-10-21 RX ADMIN — DOCUSATE SODIUM 50 MG AND SENNOSIDES 8.6 MG 2 TABLET: 8.6; 5 TABLET, FILM COATED ORAL at 04:35

## 2020-10-21 RX ADMIN — LEVOTHYROXINE SODIUM 25 MCG: 25 TABLET ORAL at 04:35

## 2020-10-21 RX ADMIN — LEVOTHYROXINE SODIUM 200 MCG: 0.2 TABLET ORAL at 04:36

## 2020-10-21 RX ADMIN — SERTRALINE HYDROCHLORIDE 100 MG: 100 TABLET ORAL at 04:35

## 2020-10-21 RX ADMIN — DOCUSATE SODIUM 50 MG AND SENNOSIDES 8.6 MG 2 TABLET: 8.6; 5 TABLET, FILM COATED ORAL at 16:46

## 2020-10-21 RX ADMIN — TRAZODONE HYDROCHLORIDE 100 MG: 100 TABLET ORAL at 20:39

## 2020-10-21 RX ADMIN — BUDESONIDE AND FORMOTEROL FUMARATE DIHYDRATE 2 PUFF: 160; 4.5 AEROSOL RESPIRATORY (INHALATION) at 16:46

## 2020-10-21 RX ADMIN — BUDESONIDE AND FORMOTEROL FUMARATE DIHYDRATE 2 PUFF: 160; 4.5 AEROSOL RESPIRATORY (INHALATION) at 04:40

## 2020-10-21 RX ADMIN — ATORVASTATIN CALCIUM 80 MG: 80 TABLET, FILM COATED ORAL at 16:46

## 2020-10-21 RX ADMIN — DIVALPROEX SODIUM 1500 MG: 500 TABLET, DELAYED RELEASE ORAL at 16:46

## 2020-10-21 RX ADMIN — ENOXAPARIN SODIUM 40 MG: 40 INJECTION SUBCUTANEOUS at 04:35

## 2020-10-21 RX ADMIN — LURASIDONE HYDROCHLORIDE 20 MG: 20 TABLET, FILM COATED ORAL at 20:39

## 2020-10-21 RX ADMIN — BUSPIRONE HYDROCHLORIDE 10 MG: 10 TABLET ORAL at 16:46

## 2020-10-21 RX ADMIN — MONTELUKAST 10 MG: 10 TABLET, FILM COATED ORAL at 16:46

## 2020-10-21 RX ADMIN — ASPIRIN 81 MG: 81 TABLET, COATED ORAL at 04:34

## 2020-10-21 RX ADMIN — BUSPIRONE HYDROCHLORIDE 10 MG: 10 TABLET ORAL at 12:00

## 2020-10-21 RX ADMIN — BUSPIRONE HYDROCHLORIDE 10 MG: 10 TABLET ORAL at 04:34

## 2020-10-21 RX ADMIN — TOPIRAMATE 25 MG: 25 TABLET, FILM COATED ORAL at 04:35

## 2020-10-21 RX ADMIN — DIVALPROEX SODIUM 500 MG: 500 TABLET, DELAYED RELEASE ORAL at 04:34

## 2020-10-21 ASSESSMENT — MONTREAL COGNITIVE ASSESSMENT (MOCA)
7. [VIGILENCE] TAP WHEN HEARING DESIGNATED LETTER: 1
WHAT LEVEL OF EDUCATION WAS ATTAINED: HIGH SCHOOL EDUCATION
VISUOSPATIAL/EXECUTIVE SUBSCORE: 5
LEVEL OF SEVERITY: MILD COGNITIVE IMPAIRMENT
WHAT IS THE TOTAL SCORE (OUT OF 30): 24
4. NAME EACH OF THE THREE ANIMALS SHOWN: 3
10. [FLUENCY] NAME WORDS STARTING WITH DESIGNATED LETTER: 0
8. SERIAL SUBTRACTION OF 7S: 3
12. MEMORY INDEX SCORE: 4
6. READ LIST OF DIGITS [FORWARD/BACKWARD]: 1
ORIENTATION SUBSCORE: 5
11. FOR EACH PAIR OF WORDS, WHAT CATEGORY DO THEY BELONG TO (OUT OF 2): 1
9. REPEAT EACH SENTENCE: 1

## 2020-10-21 ASSESSMENT — ENCOUNTER SYMPTOMS
COUGH: 0
SINUS PAIN: 0
WEAKNESS: 0
FOCAL WEAKNESS: 1
SHORTNESS OF BREATH: 0
VOMITING: 0
CONSTIPATION: 0
CHILLS: 0
DOUBLE VISION: 0
ABDOMINAL PAIN: 0
NAUSEA: 1
DIARRHEA: 0
FEVER: 0
PALPITATIONS: 0
MYALGIAS: 0
SPUTUM PRODUCTION: 0

## 2020-10-21 ASSESSMENT — PAIN DESCRIPTION - PAIN TYPE: TYPE: ACUTE PAIN

## 2020-10-21 NOTE — PROGRESS NOTES
Hospital Medicine Daily Progress Note    Date of Service  10/21/2020    Chief Complaint  Headache and recurrent right-sided weakness     Hospital Course  40 y.o. male with Medical history of type 2 diabetes mellitus, seizure disorder, conversion disorder admitted 10/16/2020 with headache and recurrent right-sided weakness in his upper and lower extremities.  Patient is a current resident of [facility].  Patient has multiple admissions this year for similar symptoms and work-up done she will no CVA or no intracranial abnormality.  Neurology consulted recommended IV Benadryl as part of the headache cocktail.  CT head, CTA head and neck, CT cerebral perfusion and MRI brain showed no acute abnormality.   Psychiatry  consulted , patient was seen by Sarah several times the past, with numerous recommendations for  psychotherapy/CBT for Conversion disorder. Psychiatry recommends referral back to [facility] for further outpatient psychiatric treatment.  Psychiatry and neurology signed off.  PT/OT recommended SNF.     Interval Problem Update  He has right upper extremity weakness is improving slowly.  PT/OT recommended SNF.  He stated that he would like to have home health upon discharge.    Consultants/Specialty  Neurology  Psychiatry    Code Status  Full Code    Disposition  Pending SNF placement    Review of Systems  Review of Systems   Constitutional: Positive for malaise/fatigue. Negative for chills and fever.   HENT: Negative for congestion, ear discharge and sinus pain.    Eyes: Negative for double vision.   Respiratory: Negative for cough, sputum production and shortness of breath.    Cardiovascular: Negative for palpitations and leg swelling.   Gastrointestinal: Positive for nausea. Negative for abdominal pain, constipation, diarrhea and vomiting.   Genitourinary: Negative for frequency.   Musculoskeletal: Negative for myalgias.   Neurological: Positive for focal weakness. Negative for weakness.        Physical  Exam  Temp:  [36.1 °C (97 °F)-36.2 °C (97.2 °F)] 36.2 °C (97.2 °F)  Pulse:  [50-65] 57  Resp:  [16-17] 17  BP: ()/(57-63) 113/63  SpO2:  [93 %-99 %] 97 %    Physical Exam  Constitutional:       General: He is not in acute distress.     Appearance: He is obese.   HENT:      Head: Normocephalic and atraumatic.      Nose: Nose normal.      Mouth/Throat:      Mouth: Mucous membranes are moist.   Eyes:      Extraocular Movements: Extraocular movements intact.      Conjunctiva/sclera: Conjunctivae normal.      Pupils: Pupils are equal, round, and reactive to light.   Neck:      Musculoskeletal: Normal range of motion and neck supple. No neck rigidity.   Cardiovascular:      Rate and Rhythm: Normal rate and regular rhythm.      Pulses: Normal pulses.      Heart sounds: Normal heart sounds.   Pulmonary:      Effort: Pulmonary effort is normal.      Breath sounds: Normal breath sounds. No wheezing.   Abdominal:      General: Bowel sounds are normal.   Musculoskeletal:         General: No swelling.   Skin:     General: Skin is warm.   Neurological:      Mental Status: He is alert.      Motor: Weakness present.      Comments: Right upper extremity weakness 3 out of 5   Psychiatric:         Mood and Affect: Affect is flat.         Speech: Speech is delayed.         Behavior: Behavior is slowed.         Cognition and Memory: Memory is impaired.         Fluids    Intake/Output Summary (Last 24 hours) at 10/21/2020 0807  Last data filed at 10/20/2020 2054  Gross per 24 hour   Intake 450 ml   Output 1000 ml   Net -550 ml       Laboratory                        Imaging  MR-BRAIN-W/O   Final Result      1.  No evidence of acute territorial infarct, intracranial hemorrhage or mass lesion.   2.  Mild diffuse cerebral substance loss.   3.  Redemonstrated diffuse confluent T2 and FLAIR signal hyperintensity in the subcortical and periventricular white matter of the bilateral cerebral hemispheres with unchanged differential  consideration of dysmyelination or demyelination versus remote    toxic insult.      DX-CHEST-PORTABLE (1 VIEW)   Final Result      Hypoinflation without acute cardiopulmonary abnormality.      CT-CEREBRAL PERFUSION ANALYSIS   Final Result      1.  Cerebral blood flow less than 30% likely representing completed infarct = 0 mL.      2.  T Max more than 6 seconds likely representing combination of completed infarct and ischemia = 0 mL.      3.  Mismatched volume likely representing ischemic brain/penumbra = None      4.  Please note that the cerebral perfusion was performed on the limited brain tissue around the basal ganglia region. Infarct/ischemia outside the CT perfusion sections can be missed in this study.      CT-CTA NECK WITH & W/O-POST PROCESSING   Final Result      No significant stenosis or dissection of the neck arteries      CT-CTA HEAD WITH & W/O-POST PROCESS   Final Result      CT angiogram of the [place] within normal limits.      CT-HEAD W/O   Final Result      1.  Stable severe nonspecific supratentorial white matter disease.   2.  No acute intracranial abnormality.           Assessment/Plan  Acute right-sided weakness- (present on admission)  Assessment & Plan  Recurrent.  Patient has history of conversion.   Improving slowly  On ASA, statin.  CT head and MRI brain no acute  Neurology signed off  Psychiatry recommend referral back to [facility] for outpatient psychiatric treatment  Fall precaution  PT/OT recommend SNF  Pending SNF placement but the patient wanted to have home health   following    Seizure disorder (HCC)  Assessment & Plan  Continue home seizure medication  Fall/seizure precaution    Type 2 diabetes mellitus with hyperglycemia, without long-term current use of insulin (HCC)- (present on admission)  Assessment & Plan  Poorly controlled with last A1c 9.5 in 6/20.  Hold oral glycemic agents.  Insulin sliding scale, Accu-Cheks, hypoglycemia protocol.    Headache-  (present on admission)  Assessment & Plan  S/p Headache cocktail per neurology.   Resolved     Psychiatric problem- (present on admission)  Assessment & Plan  Bipolar disorder, Depression, PTSD.  Continue home medications.       VTE prophylaxis: Lovenox

## 2020-10-21 NOTE — DISCHARGE PLANNING
Anticipated Discharge Disposition:   TBD    Action:    RN BOUCHRA left  for admissions with [facility] with request to return call.    Pt declined by  [facility]  [facility]    Barriers to Discharge:    Placement acceptance  Pt needs to be independent to return to group home    Plan:    F/U with [place], [place], [facility].

## 2020-10-21 NOTE — DISCHARGE PLANNING
Received Choice form at 1010  Agency/Facility Name: [facility] referrals to [facility]  Referral sent per Choice form @ 1024

## 2020-10-21 NOTE — DISCHARGE PLANNING
Anticipated Discharge Disposition:   SNF    Action:    RN BOUCHRA spoke to patient's mother, Angeli via telephone.  Provided updates and recommendations for post-acute placement.  Angeli agreeable.  Consent obtained to send blanket referrals to [facility].  Choice forms faxed to [facility].    Barriers to Discharge:    Placement acceptance    Plan:    F/U referrals.

## 2020-10-21 NOTE — THERAPY
Speech Language Pathology   Initial Assessment     Patient Name: Norm Prabhakar  AGE:  40 y.o., SEX:  male  Medical Record #: [id number]  Today's Date: 10/21/2020     Precautions  Precautions: Fall Risk  Comments: Sz disorder, psych hx    Assessment    Patient is 40 y.o. male with a diagnosis of R re-current weakness, difficulty speaking.  Additional factors influencing patient status/progress: T2DM, conversion disorder CM reports hx of habitual lying, anxiety, bipolar1, depression, fall, glaucoma, hypothyroid, mental disorder, pna, psych problem 2002, PTSD, sz disorder.  Ex-smoker x 4 mos, query drug use.  10/17 MRI; negative for acute infarct; diffuse hyperintensity subcortical and periventricular white matter of bilateral cerberal hemispheres consider remote toxic insult.  Patient seen for a cognitive linguistic evaluation and presents with a score of 24/30 on the MoCA, with 'mild' cognitive deficits.  Pt scored well on visuospatial, mental math and executive fxn subtests, fxnl verbal problem-solving per Cognistat.  Pt with mildly reduced attention, abstraction (tended to be concrete) and STM, orientation.  Speech rate is slow and speech is inconsistently effortful and dysfluent c/w 'stuttering.' Query baseline status, query inconsistent dysfluency.  Pt with PMH positive for mental and psych disorders; pt endorses slowness due to seizure disorder.  HH may be of benefit for strategies to improve speech fluency if this in not pt's baseline.    Plan    Recommend Speech Therapy 2 times per week until therapy goals are met for the following treatments:  Expression Training and Patient / Family / Caregiver Education.    Discharge Recommendations: (P) Recommend home health for continued speech therapy services  Pt appropriate for return to group home facility.       Objective       10/21/20 0830   Verbal Expression   Verbal Expression / Aphasia Eval (WDL) X   Verbal Output Functional Within Functional Limits (6-7)    Skilled Intervention Verbal Cueing;Compensatory Strategies   Comments reduced generative naming, dysfluent speech at the sentence and conversational level; query baseline.  Inconsistent dysfluency   Auditory Comprehension   Auditory Comprehension (WDL) WDL   Written Expression   Written Expression (WDL) WDL   Dominant Hand Left   Comments functional content and form   Cognitive-Linguistic   Cognitive-Linguistic (WDL) X   Level of Consciousness Alert   Orientation Level Not Oriented to Day   Short Term Memory Supervision (5)   Skilled Intervention Verbal Cueing;Compensatory Strategies   Comments refer to MoCA assessment.  Fxnl verbal problem-solving per cognistat   Outcome Measures   Outcome Measures Utilized MoCA   MoCA ([place] Cognitive Assessment)   Visuospatial/Executive 5   Naming 3   Attention - Digits 1   Attention - Letters 1   Attention - Serial 7 Subtraction 3   Language - Repeat 1   Language - Fluency 0   Language - Abstraction 1   Delayed Recall 4   Orientation 5   Level of Education 0   Total 24   Level of Severity Mild cognitive impairment   Short Term Goals   Short Term Goal # 3 Pt will verbalize 2 strategies to facilitate speech fluency in conversation with minimal cues and written aide to facilitate recall.   Education Group   Education Provided Traumatic Brain Injury / Cognitive-Linguistic   Interdisciplinary Plan of Care Collaboration   IDT Collaboration with  Nursing   Patient Position at End of Therapy Seated;Chair Alarm On;Call Light within Reach;Tray Table within Reach;Phone within Reach   Collaboration Comments Speech dysfluency; inconsistent, mild cogling deficits; query baseline.   Session Information   Date / Session Number 3, 10/21/20 (1/2-10/27 CW)   Priority 3  (2x.Diet up to reg; ck.Cog eval done;mild deficits;dsyflu sp)

## 2020-10-21 NOTE — CARE PLAN
Problem: Communication  Goal: The ability to communicate needs accurately and effectively will improve  Outcome: PROGRESSING AS EXPECTED  Intervention: Develop alternate methods of communication with patient and significant other/support system  Note: Pt encouraged to work through stutter to voice needs   Intervention: Collaborate with speech therapy  Note: SLP completed cognitive evaluation with pt.      Problem: Safety  Goal: Will remain free from injury  Outcome: PROGRESSING AS EXPECTED  Goal: Will remain free from falls  Outcome: PROGRESSING AS EXPECTED  Intervention: Implement fall precautions  Note: Bed alarm is in place, pt educated to call for assistance.        Problem: Safety:  Goal: Will remain free from injury  Outcome: PROGRESSING AS EXPECTED

## 2020-10-21 NOTE — CARE PLAN
Problem: Communication  Goal: The ability to communicate needs accurately and effectively will improve  Outcome: PROGRESSING AS EXPECTED     Problem: Safety  Goal: Will remain free from injury  Outcome: PROGRESSING AS EXPECTED     Problem: Skin Integrity  Goal: Risk for impaired skin integrity will decrease  Outcome: PROGRESSING AS EXPECTED     Problem: Communication:  Goal: The ability to communicate needs accurately and effectively will improve  Outcome: PROGRESSING AS EXPECTED     Problem: Safety:  Goal: Will remain free from injury  Outcome: PROGRESSING AS EXPECTED     Problem: Pain Management  Goal: Pain level will decrease to patient's comfort goal  Outcome: PROGRESSING AS EXPECTED

## 2020-10-22 LAB
GLUCOSE BLD-MCNC: 156 MG/DL (ref 65–99)
GLUCOSE BLD-MCNC: 156 MG/DL (ref 65–99)
GLUCOSE BLD-MCNC: 173 MG/DL (ref 65–99)
GLUCOSE BLD-MCNC: 255 MG/DL (ref 65–99)

## 2020-10-22 PROCEDURE — 97112 NEUROMUSCULAR REEDUCATION: CPT

## 2020-10-22 PROCEDURE — 82962 GLUCOSE BLOOD TEST: CPT

## 2020-10-22 PROCEDURE — A9270 NON-COVERED ITEM OR SERVICE: HCPCS | Performed by: INTERNAL MEDICINE

## 2020-10-22 PROCEDURE — 99232 SBSQ HOSP IP/OBS MODERATE 35: CPT | Performed by: INTERNAL MEDICINE

## 2020-10-22 PROCEDURE — 770006 HCHG ROOM/CARE - MED/SURG/GYN SEMI*

## 2020-10-22 PROCEDURE — 700102 HCHG RX REV CODE 250 W/ 637 OVERRIDE(OP): Performed by: STUDENT IN AN ORGANIZED HEALTH CARE EDUCATION/TRAINING PROGRAM

## 2020-10-22 PROCEDURE — 700102 HCHG RX REV CODE 250 W/ 637 OVERRIDE(OP): Performed by: INTERNAL MEDICINE

## 2020-10-22 PROCEDURE — 97535 SELF CARE MNGMENT TRAINING: CPT

## 2020-10-22 PROCEDURE — A9270 NON-COVERED ITEM OR SERVICE: HCPCS | Performed by: STUDENT IN AN ORGANIZED HEALTH CARE EDUCATION/TRAINING PROGRAM

## 2020-10-22 PROCEDURE — 700111 HCHG RX REV CODE 636 W/ 250 OVERRIDE (IP): Performed by: INTERNAL MEDICINE

## 2020-10-22 RX ADMIN — ACETAMINOPHEN 650 MG: 325 TABLET, FILM COATED ORAL at 16:57

## 2020-10-22 RX ADMIN — LURASIDONE HYDROCHLORIDE 20 MG: 20 TABLET, FILM COATED ORAL at 20:24

## 2020-10-22 RX ADMIN — DIVALPROEX SODIUM 500 MG: 500 TABLET, DELAYED RELEASE ORAL at 04:46

## 2020-10-22 RX ADMIN — LEVOTHYROXINE SODIUM 25 MCG: 25 TABLET ORAL at 04:46

## 2020-10-22 RX ADMIN — BUDESONIDE AND FORMOTEROL FUMARATE DIHYDRATE 2 PUFF: 160; 4.5 AEROSOL RESPIRATORY (INHALATION) at 04:46

## 2020-10-22 RX ADMIN — ATORVASTATIN CALCIUM 80 MG: 80 TABLET, FILM COATED ORAL at 16:48

## 2020-10-22 RX ADMIN — SERTRALINE HYDROCHLORIDE 100 MG: 100 TABLET ORAL at 04:47

## 2020-10-22 RX ADMIN — DOCUSATE SODIUM 50 MG AND SENNOSIDES 8.6 MG 2 TABLET: 8.6; 5 TABLET, FILM COATED ORAL at 04:47

## 2020-10-22 RX ADMIN — BUDESONIDE AND FORMOTEROL FUMARATE DIHYDRATE 2 PUFF: 160; 4.5 AEROSOL RESPIRATORY (INHALATION) at 16:52

## 2020-10-22 RX ADMIN — MONTELUKAST 10 MG: 10 TABLET, FILM COATED ORAL at 16:48

## 2020-10-22 RX ADMIN — FENOFIBRATE 134 MG: 134 CAPSULE ORAL at 16:48

## 2020-10-22 RX ADMIN — ENOXAPARIN SODIUM 40 MG: 40 INJECTION SUBCUTANEOUS at 04:45

## 2020-10-22 RX ADMIN — BUSPIRONE HYDROCHLORIDE 10 MG: 10 TABLET ORAL at 04:47

## 2020-10-22 RX ADMIN — ASPIRIN 81 MG: 81 TABLET, COATED ORAL at 04:47

## 2020-10-22 RX ADMIN — BUSPIRONE HYDROCHLORIDE 10 MG: 10 TABLET ORAL at 11:21

## 2020-10-22 RX ADMIN — DIVALPROEX SODIUM 1500 MG: 500 TABLET, DELAYED RELEASE ORAL at 16:48

## 2020-10-22 RX ADMIN — BUSPIRONE HYDROCHLORIDE 10 MG: 10 TABLET ORAL at 16:48

## 2020-10-22 RX ADMIN — LEVOTHYROXINE SODIUM 200 MCG: 0.2 TABLET ORAL at 04:46

## 2020-10-22 RX ADMIN — TOPIRAMATE 25 MG: 25 TABLET, FILM COATED ORAL at 04:47

## 2020-10-22 ASSESSMENT — ENCOUNTER SYMPTOMS
COUGH: 0
FOCAL WEAKNESS: 1
FEVER: 0
WEAKNESS: 0
CHILLS: 0
ABDOMINAL PAIN: 0
CONSTIPATION: 0
DIARRHEA: 0
PALPITATIONS: 0
DOUBLE VISION: 0
NAUSEA: 1
VOMITING: 0
MYALGIAS: 0

## 2020-10-22 ASSESSMENT — COGNITIVE AND FUNCTIONAL STATUS - GENERAL
PERSONAL GROOMING: A LITTLE
DAILY ACTIVITIY SCORE: 16
DRESSING REGULAR LOWER BODY CLOTHING: A LOT
DRESSING REGULAR UPPER BODY CLOTHING: A LITTLE
TOILETING: A LOT
HELP NEEDED FOR BATHING: A LOT
SUGGESTED CMS G CODE MODIFIER DAILY ACTIVITY: CK

## 2020-10-22 ASSESSMENT — PAIN DESCRIPTION - PAIN TYPE
TYPE: ACUTE PAIN
TYPE: ACUTE PAIN

## 2020-10-22 NOTE — CARE PLAN
Problem: Safety  Goal: Will remain free from injury  Outcome: PROGRESSING AS EXPECTED     Problem: Venous Thromboembolism (VTW)/Deep Vein Thrombosis (DVT) Prevention:  Goal: Patient will participate in Venous Thrombosis (VTE)/Deep Vein Thrombosis (DVT)Prevention Measures  Outcome: PROGRESSING AS EXPECTED     Problem: Respiratory:  Goal: Respiratory status will improve  Outcome: PROGRESSING AS EXPECTED     Problem: Safety:  Goal: Will remain free from injury  Outcome: PROGRESSING AS EXPECTED

## 2020-10-22 NOTE — PROGRESS NOTES
Hospital Medicine Daily Progress Note    Date of Service  10/22/2020    Chief Complaint  Headache and recurrent right-sided weakness     Hospital Course  40 y.o. male with Medical history of type 2 diabetes mellitus, seizure disorder, conversion disorder admitted 10/16/2020 with headache and recurrent right-sided weakness in his upper and lower extremities.  Patient is a current resident of [facility].  Patient has multiple admissions this year for similar symptoms and work-up done she will no CVA or no intracranial abnormality.  Neurology consulted recommended IV Benadryl as part of the headache cocktail.  CT head, CTA head and neck, CT cerebral perfusion and MRI brain showed no acute abnormality.   Psychiatry  consulted , patient was seen by Sarah several times the past, with numerous recommendations for  psychotherapy/CBT for Conversion disorder. Psychiatry recommends referral back to [facility] for further outpatient psychiatric treatment.  Psychiatry and neurology signed off.  PT/OT recommended SNF.     Interval Problem Update  right upper extremity weakness is improving.  Was refused by multiple SNF placement.  Seems like the plan is to get back to group home once he is back to baseline.    Consultants/Specialty  Neurology  Psychiatry    Code Status  Full Code    Disposition  Pending SNF placement    Review of Systems  Review of Systems   Constitutional: Positive for malaise/fatigue. Negative for chills and fever.   HENT: Negative for congestion and ear discharge.    Eyes: Negative for double vision.   Respiratory: Negative for cough.    Cardiovascular: Negative for palpitations and leg swelling.   Gastrointestinal: Positive for nausea. Negative for abdominal pain, constipation, diarrhea and vomiting.   Genitourinary: Negative for frequency.   Musculoskeletal: Negative for myalgias.   Neurological: Positive for focal weakness. Negative for weakness.        Physical Exam  Temp:  [36.3 °C (97.3 °F)-36.7 °C (98 °F)]  36.3 °C (97.3 °F)  Pulse:  [48-67] 48  Resp:  [16-17] 16  BP: (107-123)/(53-74) 107/53  SpO2:  [94 %-97 %] 96 %    Physical Exam  Constitutional:       General: He is not in acute distress.     Appearance: He is obese.   HENT:      Head: Normocephalic and atraumatic.      Nose: Nose normal.      Mouth/Throat:      Mouth: Mucous membranes are moist.   Eyes:      Conjunctiva/sclera: Conjunctivae normal.      Pupils: Pupils are equal, round, and reactive to light.   Neck:      Musculoskeletal: Normal range of motion and neck supple.   Cardiovascular:      Rate and Rhythm: Normal rate and regular rhythm.      Pulses: Normal pulses.      Heart sounds: Normal heart sounds.   Pulmonary:      Effort: Pulmonary effort is normal.      Breath sounds: Normal breath sounds.   Abdominal:      General: Bowel sounds are normal.   Skin:     General: Skin is warm.   Neurological:      Mental Status: He is alert.      Motor: Weakness present.      Comments: Right upper extremity weakness 3 out of 5   Psychiatric:         Mood and Affect: Affect is flat.         Speech: Speech is delayed.         Behavior: Behavior is slowed.         Cognition and Memory: Memory is impaired.         Fluids    Intake/Output Summary (Last 24 hours) at 10/22/2020 0815  Last data filed at 10/22/2020 0451  Gross per 24 hour   Intake 650 ml   Output 800 ml   Net -150 ml       Laboratory                        Imaging  MR-BRAIN-W/O   Final Result      1.  No evidence of acute territorial infarct, intracranial hemorrhage or mass lesion.   2.  Mild diffuse cerebral substance loss.   3.  Redemonstrated diffuse confluent T2 and FLAIR signal hyperintensity in the subcortical and periventricular white matter of the bilateral cerebral hemispheres with unchanged differential consideration of dysmyelination or demyelination versus remote    toxic insult.      DX-CHEST-PORTABLE (1 VIEW)   Final Result      Hypoinflation without acute cardiopulmonary abnormality.       CT-CEREBRAL PERFUSION ANALYSIS   Final Result      1.  Cerebral blood flow less than 30% likely representing completed infarct = 0 mL.      2.  T Max more than 6 seconds likely representing combination of completed infarct and ischemia = 0 mL.      3.  Mismatched volume likely representing ischemic brain/penumbra = None      4.  Please note that the cerebral perfusion was performed on the limited brain tissue around the basal ganglia region. Infarct/ischemia outside the CT perfusion sections can be missed in this study.      CT-CTA NECK WITH & W/O-POST PROCESSING   Final Result      No significant stenosis or dissection of the neck arteries      CT-CTA HEAD WITH & W/O-POST PROCESS   Final Result      CT angiogram of the [place] within normal limits.      CT-HEAD W/O   Final Result      1.  Stable severe nonspecific supratentorial white matter disease.   2.  No acute intracranial abnormality.           Assessment/Plan  Acute right-sided weakness- (present on admission)  Assessment & Plan  Recurrent.  Patient has history of conversion.   Improving slowly  On ASA, statin.  CT head and MRI brain no acute  Neurology signed off  Psychiatry recommend referral back to [facility] for outpatient psychiatric treatment  Fall precaution  PT/OT recommend SNF  Pending SNF placement but the patient wanted to have home health   following  Likely back to retirement    Seizure disorder (HCC)  Assessment & Plan  Continue home seizure medication  Fall/seizure precaution    Type 2 diabetes mellitus with hyperglycemia, without long-term current use of insulin (HCC)- (present on admission)  Assessment & Plan  Poorly controlled with last A1c 9.5 in 6/20.  Hold oral glycemic agents.  Insulin sliding scale, Accu-Cheks, hypoglycemia protocol.    Headache- (present on admission)  Assessment & Plan  S/p Headache cocktail per neurology.   Resolved     Psychiatric problem- (present on admission)  Assessment & Plan  Bipolar  disorder, Depression, PTSD.  Continue home medications.       VTE prophylaxis: Lovenox

## 2020-10-22 NOTE — CARE PLAN
Problem: Communication  Goal: The ability to communicate needs accurately and effectively will improve  Outcome: PROGRESSING AS EXPECTED  Intervention: Educate patient and significant other/support system about the plan of care, procedures, treatments, medications and allow for questions  Note: A/Ox4, expressive aphasia. Educated on POC, treatment and medications. WCTM     Problem: Safety  Goal: Will remain free from injury  Outcome: PROGRESSING AS EXPECTED  Intervention: Educate patient and significant other/support system about adaptive mobility strategies and safe transfers  Note: Pt is a 2max stand/pivot to chair. R sided weakness. Call light and personal belongings within reach, able to make needs known. No impulsive behavior noted so far during shift. Hourly rounding, fall precautions, safety maintained. WCTM       Problem: Skin Integrity  Goal: Risk for impaired skin integrity will decrease  Outcome: PROGRESSING AS EXPECTED  Intervention: Implement precautions to protect skin integrity in collaboration with the interdisciplinary team  Note: No new signs skin breakdown. Skin kept clean and dry. WCTM       Problem: Safety:  Goal: Will remain free from injury  Outcome: PROGRESSING AS EXPECTED  Intervention: Educate patient and significant other/support system about adaptive mobility strategies and safe transfers  Note: Pt is a 2max stand/pivot to chair. R sided weakness. Call light and personal belongings within reach, able to make needs known. No impulsive behavior noted so far during shift. Hourly rounding, fall precautions, safety maintained. WCTM

## 2020-10-22 NOTE — THERAPY
Occupational Therapy  Daily Treatment     Patient Name: Norm Prabhakar  Age:  40 y.o., Sex:  male  Medical Record #: [id number]  Today's Date: 10/22/2020     Precautions  Precautions: (P) Fall Risk  Comments: (P) seizure disorder, psych history, frequent recent admits for simialr symptoms    Assessment    Significant improvement in motor function and control of right side. Able to use right UE to grab bar, push self to sitting, hold and play on phone. Able to weight shift,  and step with right foot, maintain upright posture during bed to chair mobility using FWW.    Plan    Continue current treatment plan.    DC Equipment Recommendations: (P) Unable to determine at this time  Discharge Recommendations: (P) Recommend post-acute placement for additional occupational therapy services prior to discharge home    Subjective    Pleasant and cooperative throughout. Reviewed need for significant improvement in motor function is he is to go home with home health, instead of going to SNF     Objective       10/22/20 1101   Total Time Spent   Total Time Spent (Mins) 42   Treatment Charges   Charges Yes   OT Self Care / ADL 2   OT Neuromuscular Re-education / Balance 1   Precautions   Precautions Fall Risk   Comments seizure disorder, psych history, frequent recent admits for simialr symptoms   Vitals   O2 Delivery Device None - Room Air   Pain 0 - 10 Group   Therapist Pain Assessment Post Activity Pain Same as Prior to Activity;Nurse Notified;0   Cognition    Level of Consciousness Alert   Comments pleasant and cooperative. spoke at length about his fiancee coming from [facility] to  him,, at the end of November   Passive ROM Upper Body   Passive ROM Upper Body WDL   Active ROM Upper Body   Comments improving right UE function, able to use o grap and hold phone, play video games, prop self EOB and push up from supine with assist   Strength Upper Body   Upper Body Strength  X   Comments left WFL> right UE 2+/5 grossly,  active motion against gravty at each joint observed.   Sensation Upper Body   Upper Extremity Sensation  WDL   Upper Body Muscle Tone   Upper Body Muscle Tone  WDL   Supine Upper Body Exercises   Supine Upper Body Exercises Yes   Other Exercise AROM to each joint in right UE, tolerated 5 reps of each exercsie prior to fatiguing   Sitting Upper Body Exercises   Sitting Upper Body Exercises Yes   Other Exercise sliding right hand up and down leg, across mattress, notd improving right UE motor control   Other Treatments   Other Treatments Provided reviewed need for motor recovery in order for him to return to prior living situation, instead of SNF for rehab. verbalises understanding and desire to return home instead of SNF   Balance   Sitting Balance (Static) Fair +   Sitting Balance (Dynamic) Fair +   Standing Balance (Static) Fair -   Standing Balance (Dynamic) Poor +   Weight Shift Sitting Fair   Weight Shift Standing Fair   Skilled Intervention Compensatory Strategies;Verbal Cuing;Tactile Cuing   Comments much improved control and use of right leg during mobility tasks, using FWW. able to  right foot and take 5-6 shuffling steps from bed to chair.   Bed Mobility    Supine to Sit Moderate Assist   Sit to Supine   (left seated in bedside chair)   Scooting Supervised   Skilled Intervention Verbal Cuing;Compensatory Strategies   Activities of Daily Living   Eating Independent   Grooming Supervision;Seated   Bathing   (set up for seated, upper body)   Upper Body Dressing Minimal Assist  (gown- to manage snaps/ ties)   Lower Body Dressing Moderate Assist  (shorts)   Toileting Moderate Assist   Skilled Intervention Compensatory Strategies;Verbal Cuing;Tactile Cuing   How much help from another person does the patient currently need...   Putting on and taking off regular lower body clothing? 2   Bathing (including washing, rinsing, and drying)? 2   Toileting, which includes using a toilet, bedpan, or urinal? 2    Putting on and taking off regular upper body clothing? 3   Taking care of personal grooming such as brushing teeth? 3   Eating meals? 4   6 Clicks Daily Activity Score 16   Functional Mobility   Sit to Stand Minimal Assist   Bed, Chair, Wheelchair Transfer Minimal Assist   Toilet Transfers Minimal Assist   Transfer Method Stand Pivot   Comments FWW   Visual Perception   Visual Perception  WDL   Patient / Family Goals   Patient / Family Goal #1 none stated   Short Term Goals   Short Term Goal # 1 SBA necessary functional transfers   Goal Outcome # 1 Progressing as expected   Short Term Goal # 2 supervised level for toileting tasks   Goal Outcome # 2 Progressing as expected   Short Term Goal # 3 parity of UE strength and function   Goal Outcome # 3 Progressing as expected   Education Group   Role of Occupational Therapist Patient Response Patient;Acceptance;Explanation;Demonstration;Reinforcement Needed   Anticipated Discharge Equipment and Recommendations   DC Equipment Recommendations Unable to determine at this time   Discharge Recommendations Recommend post-acute placement for additional occupational therapy services prior to discharge home   Interdisciplinary Plan of Care Collaboration   IDT Collaboration with  Nursing   Patient Position at End of Therapy Seated;Chair Alarm On;Call Light within Reach;Tray Table within Reach;Phone within Reach   Collaboration Comments report given   Session Information   Date / Session Number  10/22,2( 2/3,10/24)   Priority 2

## 2020-10-23 LAB
GLUCOSE BLD-MCNC: 162 MG/DL (ref 65–99)
GLUCOSE BLD-MCNC: 164 MG/DL (ref 65–99)
GLUCOSE BLD-MCNC: 191 MG/DL (ref 65–99)
GLUCOSE BLD-MCNC: 191 MG/DL (ref 65–99)

## 2020-10-23 PROCEDURE — 99232 SBSQ HOSP IP/OBS MODERATE 35: CPT | Performed by: INTERNAL MEDICINE

## 2020-10-23 PROCEDURE — 700102 HCHG RX REV CODE 250 W/ 637 OVERRIDE(OP): Performed by: INTERNAL MEDICINE

## 2020-10-23 PROCEDURE — 700102 HCHG RX REV CODE 250 W/ 637 OVERRIDE(OP): Performed by: STUDENT IN AN ORGANIZED HEALTH CARE EDUCATION/TRAINING PROGRAM

## 2020-10-23 PROCEDURE — 82962 GLUCOSE BLOOD TEST: CPT | Mod: 91

## 2020-10-23 PROCEDURE — A9270 NON-COVERED ITEM OR SERVICE: HCPCS | Performed by: INTERNAL MEDICINE

## 2020-10-23 PROCEDURE — 97116 GAIT TRAINING THERAPY: CPT

## 2020-10-23 PROCEDURE — 700111 HCHG RX REV CODE 636 W/ 250 OVERRIDE (IP): Performed by: INTERNAL MEDICINE

## 2020-10-23 PROCEDURE — 770006 HCHG ROOM/CARE - MED/SURG/GYN SEMI*

## 2020-10-23 PROCEDURE — A9270 NON-COVERED ITEM OR SERVICE: HCPCS | Performed by: STUDENT IN AN ORGANIZED HEALTH CARE EDUCATION/TRAINING PROGRAM

## 2020-10-23 PROCEDURE — 97530 THERAPEUTIC ACTIVITIES: CPT

## 2020-10-23 RX ADMIN — FENOFIBRATE 134 MG: 134 CAPSULE ORAL at 17:20

## 2020-10-23 RX ADMIN — ACETAMINOPHEN 650 MG: 325 TABLET, FILM COATED ORAL at 20:00

## 2020-10-23 RX ADMIN — ENOXAPARIN SODIUM 40 MG: 40 INJECTION SUBCUTANEOUS at 06:02

## 2020-10-23 RX ADMIN — ACETAMINOPHEN 650 MG: 325 TABLET, FILM COATED ORAL at 11:37

## 2020-10-23 RX ADMIN — LEVOTHYROXINE SODIUM 25 MCG: 25 TABLET ORAL at 06:03

## 2020-10-23 RX ADMIN — DIVALPROEX SODIUM 500 MG: 500 TABLET, DELAYED RELEASE ORAL at 06:02

## 2020-10-23 RX ADMIN — DIVALPROEX SODIUM 1500 MG: 500 TABLET, DELAYED RELEASE ORAL at 17:20

## 2020-10-23 RX ADMIN — ACETAMINOPHEN 650 MG: 325 TABLET, FILM COATED ORAL at 04:58

## 2020-10-23 RX ADMIN — BUDESONIDE AND FORMOTEROL FUMARATE DIHYDRATE 2 PUFF: 160; 4.5 AEROSOL RESPIRATORY (INHALATION) at 17:23

## 2020-10-23 RX ADMIN — MONTELUKAST 10 MG: 10 TABLET, FILM COATED ORAL at 17:20

## 2020-10-23 RX ADMIN — BUSPIRONE HYDROCHLORIDE 10 MG: 10 TABLET ORAL at 11:32

## 2020-10-23 RX ADMIN — ATORVASTATIN CALCIUM 80 MG: 80 TABLET, FILM COATED ORAL at 17:20

## 2020-10-23 RX ADMIN — LEVOTHYROXINE SODIUM 200 MCG: 0.2 TABLET ORAL at 06:01

## 2020-10-23 RX ADMIN — LURASIDONE HYDROCHLORIDE 20 MG: 20 TABLET, FILM COATED ORAL at 20:00

## 2020-10-23 RX ADMIN — BUSPIRONE HYDROCHLORIDE 10 MG: 10 TABLET ORAL at 06:02

## 2020-10-23 RX ADMIN — SERTRALINE HYDROCHLORIDE 100 MG: 100 TABLET ORAL at 06:02

## 2020-10-23 RX ADMIN — TOPIRAMATE 25 MG: 25 TABLET, FILM COATED ORAL at 06:02

## 2020-10-23 RX ADMIN — ASPIRIN 81 MG: 81 TABLET, COATED ORAL at 06:01

## 2020-10-23 RX ADMIN — BUSPIRONE HYDROCHLORIDE 10 MG: 10 TABLET ORAL at 17:20

## 2020-10-23 ASSESSMENT — ENCOUNTER SYMPTOMS
CHILLS: 0
WEAKNESS: 0
BLURRED VISION: 0
NAUSEA: 1
MYALGIAS: 0
FEVER: 0
DOUBLE VISION: 0
COUGH: 0
VOMITING: 0
PALPITATIONS: 0
ABDOMINAL PAIN: 0
FOCAL WEAKNESS: 1
DIARRHEA: 0
CONSTIPATION: 0
ORTHOPNEA: 0

## 2020-10-23 ASSESSMENT — GAIT ASSESSMENTS
ASSISTIVE DEVICE: FRONT WHEEL WALKER
GAIT LEVEL OF ASSIST: MINIMAL ASSIST
DISTANCE (FEET): 5
DEVIATION: DECREASED BASE OF SUPPORT;DECREASED HEEL STRIKE;DECREASED TOE OFF

## 2020-10-23 ASSESSMENT — COGNITIVE AND FUNCTIONAL STATUS - GENERAL
CLIMB 3 TO 5 STEPS WITH RAILING: TOTAL
TURNING FROM BACK TO SIDE WHILE IN FLAT BAD: A LITTLE
MOVING TO AND FROM BED TO CHAIR: UNABLE
STANDING UP FROM CHAIR USING ARMS: A LITTLE
MOVING FROM LYING ON BACK TO SITTING ON SIDE OF FLAT BED: UNABLE
MOBILITY SCORE: 11
WALKING IN HOSPITAL ROOM: A LOT
SUGGESTED CMS G CODE MODIFIER MOBILITY: CL

## 2020-10-23 ASSESSMENT — PAIN DESCRIPTION - PAIN TYPE
TYPE: ACUTE PAIN

## 2020-10-23 NOTE — PROGRESS NOTES
Hospital Medicine Daily Progress Note    Date of Service  10/23/2020    Chief Complaint  Headache and recurrent right-sided weakness     Hospital Course  40 y.o. male with Medical history of type 2 diabetes mellitus, seizure disorder, conversion disorder admitted 10/16/2020 with headache and recurrent right-sided weakness in his upper and lower extremities.  Patient is a current resident of [facility].  Patient has multiple admissions this year for similar symptoms and work-up done she will no CVA or no intracranial abnormality.  Neurology consulted recommended IV Benadryl as part of the headache cocktail.  CT head, CTA head and neck, CT cerebral perfusion and MRI brain showed no acute abnormality.   Psychiatry  consulted , patient was seen by Sarah several times the past, with numerous recommendations for  psychotherapy/CBT for Conversion disorder. Psychiatry recommends referral back to [facility] for further outpatient psychiatric treatment.  Psychiatry and neurology signed off.  PT/OT recommended SNF.     Interval Problem Update  right upper extremity weakness is improving slowly elevated.  plan is to get back to group home once he is back to baseline.    Consultants/Specialty  Neurology  Psychiatry    Code Status  Full Code    Disposition  Pending SNF placement    Review of Systems  Review of Systems   Constitutional: Positive for malaise/fatigue. Negative for chills and fever.   HENT: Negative for congestion and ear discharge.    Eyes: Negative for blurred vision and double vision.   Respiratory: Negative for cough.    Cardiovascular: Negative for palpitations, orthopnea and leg swelling.   Gastrointestinal: Positive for nausea. Negative for abdominal pain, constipation, diarrhea and vomiting.   Genitourinary: Negative for frequency.   Musculoskeletal: Negative for myalgias.   Neurological: Positive for focal weakness. Negative for weakness.        Physical Exam  Temp:  [36.1 °C (97 °F)-37 °C (98.6 °F)] 37 °C  (98.6 °F)  Pulse:  [54-72] 54  Resp:  [16-18] 18  BP: (110-123)/(68-95) 110/95  SpO2:  [92 %-96 %] 96 %    Physical Exam  Constitutional:       General: He is not in acute distress.     Appearance: He is obese.   HENT:      Head: Normocephalic and atraumatic.      Nose: Nose normal.      Mouth/Throat:      Mouth: Mucous membranes are moist.   Eyes:      Conjunctiva/sclera: Conjunctivae normal.      Pupils: Pupils are equal, round, and reactive to light.   Neck:      Musculoskeletal: Normal range of motion and neck supple.   Cardiovascular:      Rate and Rhythm: Normal rate and regular rhythm.      Pulses: Normal pulses.   Pulmonary:      Effort: Pulmonary effort is normal.   Abdominal:      General: Bowel sounds are normal.   Skin:     General: Skin is warm.   Neurological:      Mental Status: He is alert.      Motor: Weakness present.      Comments: Right upper extremity weakness 3 out of 5   Psychiatric:         Mood and Affect: Affect is flat.         Speech: Speech is delayed.         Behavior: Behavior is slowed.         Cognition and Memory: Memory is impaired.         Fluids    Intake/Output Summary (Last 24 hours) at 10/23/2020 0800  Last data filed at 10/22/2020 2158  Gross per 24 hour   Intake --   Output 2160 ml   Net -2160 ml       Laboratory                        Imaging  MR-BRAIN-W/O   Final Result      1.  No evidence of acute territorial infarct, intracranial hemorrhage or mass lesion.   2.  Mild diffuse cerebral substance loss.   3.  Redemonstrated diffuse confluent T2 and FLAIR signal hyperintensity in the subcortical and periventricular white matter of the bilateral cerebral hemispheres with unchanged differential consideration of dysmyelination or demyelination versus remote    toxic insult.      DX-CHEST-PORTABLE (1 VIEW)   Final Result      Hypoinflation without acute cardiopulmonary abnormality.      CT-CEREBRAL PERFUSION ANALYSIS   Final Result      1.  Cerebral blood flow less than 30%  likely representing completed infarct = 0 mL.      2.  T Max more than 6 seconds likely representing combination of completed infarct and ischemia = 0 mL.      3.  Mismatched volume likely representing ischemic brain/penumbra = None      4.  Please note that the cerebral perfusion was performed on the limited brain tissue around the basal ganglia region. Infarct/ischemia outside the CT perfusion sections can be missed in this study.      CT-CTA NECK WITH & W/O-POST PROCESSING   Final Result      No significant stenosis or dissection of the neck arteries      CT-CTA HEAD WITH & W/O-POST PROCESS   Final Result      CT angiogram of the [place] within normal limits.      CT-HEAD W/O   Final Result      1.  Stable severe nonspecific supratentorial white matter disease.   2.  No acute intracranial abnormality.           Assessment/Plan  Acute right-sided weakness- (present on admission)  Assessment & Plan  Recurrent.  Patient has history of conversion.   Improving slowly  On ASA, statin.  CT head and MRI brain no acute  Neurology signed off  Psychiatry recommend referral back to [facility] for outpatient psychiatric treatment  Fall precaution  PT/OT recommend SNF  Likely back to halfway    Seizure disorder (HCC)  Assessment & Plan  Continue home seizure medication  Fall/seizure precaution    Type 2 diabetes mellitus with hyperglycemia, without long-term current use of insulin (HCC)- (present on admission)  Assessment & Plan  Poorly controlled with last A1c 9.5 in 6/20.  Hold oral glycemic agents.  Insulin sliding scale, Accu-Cheks, hypoglycemia protocol.    Headache- (present on admission)  Assessment & Plan  S/p Headache cocktail per neurology.   Resolved     Psychiatric problem- (present on admission)  Assessment & Plan  Bipolar disorder, Depression, PTSD.  Continue home medications.       VTE prophylaxis: Lovenox

## 2020-10-23 NOTE — CARE PLAN
Problem: Safety  Goal: Will remain free from falls  Outcome: PROGRESSING AS EXPECTED  Intervention: Implement fall precautions  Flowsheets (Taken 10/22/2020 2226)  Environmental Precautions:   Treaded Slipper Socks on Patient   Personal Belongings, Wastebasket, Call Bell etc. in Easy Reach   Transferred to Stronger Side   Report Given to Other Health Care Providers Regarding Fall Risk   Bed in Low Position   Communication Sign for Patients & Families   Mobility Assessed & Appropriate Sign Placed     Problem: Knowledge Deficit  Goal: Knowledge of disease process/condition, treatment plan, diagnostic tests, and medications will improve  Outcome: PROGRESSING AS EXPECTED  Intervention: Explain information regarding disease process/condition, treatment plan, diagnostic tests, and medications and document in education  Note: Continue to educate patient on ADA diet.

## 2020-10-23 NOTE — THERAPY
Physical Therapy   Daily Treatment     Patient Name: Norm Prabhakar  Age:  40 y.o., Sex:  male  Medical Record #: [id number]  Today's Date: 10/23/2020     Precautions: Fall Risk    Assessment    Pt agreeable to PT treatment session, c/o L lateral foot pain today which is making it difficult to WB during gait. Pt with improved recruitment through R LE with activities (supine bridging, spinal twists and sit <> stand); however, continues to c/o paralysis. Pt agreeable to increased ambulation distance (5 ftx 2) with pt able to ambulate backward w/o significant difficulty or knee buckling. PT will continue to follow while in house.     Plan    Continue current treatment plan.    DC Equipment Recommendations: Unable to determine at this time  Discharge Recommendations: Recommend post-acute placement for additional physical therapy services prior to discharge home       10/23/20 1046   Precautions   Precautions Fall Risk   Comments seizure disorder, psych history, frequent recent admits for simialr symptoms   Vitals   O2 Delivery Device None - Room Air   Pain 0 - 10 Group   Therapist Pain Assessment During Activity;Nurse Notified  (L lateral foot pain, not rated)   Cognition    Level of Consciousness Alert   Comments cooperative, tangential with conversation- appears in attempt to delay mobility   Active ROM Lower Body    Active ROM Lower Body  X   Comments limited by R weakness, limited volitional effort   Strength Lower Body   Lower Body Strength  X   Comments observed and palpated mm contraction throughout R LE, no knee buckling during gait; however, pt continues to report paralysis and limited effort during testing   Supine Lower Body Exercise   Supine Lower Body Exercises Yes   Bridges 1 set of 10;Two Legged  (very partial ROM. activation noted throughout R LE)   Other Exercises supine spinal twist x4 each side, no assist required to rotate knees from side to side   Balance   Sitting Balance (Static) Good   Sitting  "Balance (Dynamic) Fair +   Standing Balance (Static) Fair   Standing Balance (Dynamic) Fair -   Weight Shift Sitting Good   Weight Shift Standing Fair   Skilled Intervention Verbal Cuing;Compensatory Strategies   Comments w/ FWW   Gait Analysis   Gait Level Of Assist Minimal Assist   Assistive Device Front Wheel Walker   Distance (Feet) 5   # of Times Distance was Traveled 2  (fwd and then stepping straight bwd to chair)   Deviation Decreased Base Of Support;Decreased Heel Strike;Decreased Toe Off   # of Stairs Climbed 0   Weight Bearing Status no restrictions   Skilled Intervention Verbal Cuing;Compensatory Strategies   Comments pt agreeable to attempt ambulation to sink but only able to complete distance half way before requesting to walk backward to return to chair   Bed Mobility    Supine to Sit Minimal Assist  (HOB flat, no railing. PT hand used for support )   Sit to Supine   (seated in chair at end of session)   Scooting Supervised   Rolling Supervised   Skilled Intervention Verbal Cuing;Compensatory Strategies   Functional Mobility   Sit to Stand Minimal Assist   Bed, Chair, Wheelchair Transfer Supervised  (cues for safety during descent to chair)   Patient / Family Goals    Patient / Family Goal #1 \"To get better for my job interview and wedding in November\"   Goal #1 Outcome Goal not met   Short Term Goals    Short Term Goal # 1 Pt will be able to transfer supine<>sitting with SPV in 6tx in order to return to baseline   Goal Outcome # 1 Progressing as expected   Short Term Goal # 2 Pt will be able to complete STS with LRAD and SPV in 6tx in order to return to prior level   Goal Outcome # 2 Goal not met   Short Term Goal # 3 Pt will be able to ambulate 25ft with LRAD and min assist in 6tx in order to progress back to prior level   Goal Outcome # 3 Goal not met   Short Term Goal # 4 Pt will be able to negotiate 3 steps with min assist in 6tx in order to progress back to FOS needed to return to housing   Goal " Outcome # 4 Goal not met   Anticipated Discharge Equipment and Recommendations   DC Equipment Recommendations Unable to determine at this time   Discharge Recommendations Recommend post-acute placement for additional physical therapy services prior to discharge home

## 2020-10-23 NOTE — PROGRESS NOTES
Assumed care of patient at beginning of shift, plan of care discussed, assessment completed, patient educated on all scheduled and PRN medications before administration. Patient assisted with ADL's, states he understands use of call light. All fall risk, seizure, and aspiration precautions followed per policy. Patient resting at end of shift, no needs, call light in reach.

## 2020-10-24 LAB
GLUCOSE BLD-MCNC: 148 MG/DL (ref 65–99)
GLUCOSE BLD-MCNC: 163 MG/DL (ref 65–99)
GLUCOSE BLD-MCNC: 197 MG/DL (ref 65–99)
GLUCOSE BLD-MCNC: 210 MG/DL (ref 65–99)

## 2020-10-24 PROCEDURE — A9270 NON-COVERED ITEM OR SERVICE: HCPCS | Performed by: STUDENT IN AN ORGANIZED HEALTH CARE EDUCATION/TRAINING PROGRAM

## 2020-10-24 PROCEDURE — 770006 HCHG ROOM/CARE - MED/SURG/GYN SEMI*

## 2020-10-24 PROCEDURE — 99232 SBSQ HOSP IP/OBS MODERATE 35: CPT | Performed by: INTERNAL MEDICINE

## 2020-10-24 PROCEDURE — 700111 HCHG RX REV CODE 636 W/ 250 OVERRIDE (IP): Performed by: INTERNAL MEDICINE

## 2020-10-24 PROCEDURE — 700102 HCHG RX REV CODE 250 W/ 637 OVERRIDE(OP): Performed by: STUDENT IN AN ORGANIZED HEALTH CARE EDUCATION/TRAINING PROGRAM

## 2020-10-24 PROCEDURE — 82962 GLUCOSE BLOOD TEST: CPT

## 2020-10-24 RX ADMIN — BUSPIRONE HYDROCHLORIDE 10 MG: 10 TABLET ORAL at 17:48

## 2020-10-24 RX ADMIN — TOPIRAMATE 25 MG: 25 TABLET, FILM COATED ORAL at 04:38

## 2020-10-24 RX ADMIN — FENOFIBRATE 134 MG: 134 CAPSULE ORAL at 17:49

## 2020-10-24 RX ADMIN — BUSPIRONE HYDROCHLORIDE 10 MG: 10 TABLET ORAL at 04:38

## 2020-10-24 RX ADMIN — DIVALPROEX SODIUM 1500 MG: 500 TABLET, DELAYED RELEASE ORAL at 17:48

## 2020-10-24 RX ADMIN — LEVOTHYROXINE SODIUM 200 MCG: 0.2 TABLET ORAL at 04:37

## 2020-10-24 RX ADMIN — SERTRALINE HYDROCHLORIDE 100 MG: 100 TABLET ORAL at 04:37

## 2020-10-24 RX ADMIN — DIVALPROEX SODIUM 500 MG: 500 TABLET, DELAYED RELEASE ORAL at 04:38

## 2020-10-24 RX ADMIN — ASPIRIN 81 MG: 81 TABLET, COATED ORAL at 04:37

## 2020-10-24 RX ADMIN — ATORVASTATIN CALCIUM 80 MG: 80 TABLET, FILM COATED ORAL at 17:48

## 2020-10-24 RX ADMIN — LEVOTHYROXINE SODIUM 25 MCG: 25 TABLET ORAL at 04:38

## 2020-10-24 RX ADMIN — BUSPIRONE HYDROCHLORIDE 10 MG: 10 TABLET ORAL at 12:38

## 2020-10-24 RX ADMIN — LURASIDONE HYDROCHLORIDE 20 MG: 20 TABLET, FILM COATED ORAL at 20:04

## 2020-10-24 RX ADMIN — ENOXAPARIN SODIUM 40 MG: 40 INJECTION SUBCUTANEOUS at 04:37

## 2020-10-24 RX ADMIN — MONTELUKAST 10 MG: 10 TABLET, FILM COATED ORAL at 17:49

## 2020-10-24 ASSESSMENT — ENCOUNTER SYMPTOMS
ABDOMINAL PAIN: 0
FOCAL WEAKNESS: 1
DIARRHEA: 0
COUGH: 0
MYALGIAS: 0
DOUBLE VISION: 0
WEAKNESS: 0
CONSTIPATION: 0
ORTHOPNEA: 0
CHILLS: 0
NAUSEA: 1

## 2020-10-24 ASSESSMENT — PAIN DESCRIPTION - PAIN TYPE: TYPE: ACUTE PAIN

## 2020-10-24 NOTE — CARE PLAN
Problem: Communication  Goal: The ability to communicate needs accurately and effectively will improve  Outcome: PROGRESSING AS EXPECTED     Problem: Safety  Goal: Will remain free from injury  Outcome: PROGRESSING AS EXPECTED     Problem: Discharge Barriers/Planning  Goal: Patient's continuum of care needs will be met  Outcome: PROGRESSING AS EXPECTED     Problem: Safety:  Goal: Will remain free from injury  Outcome: PROGRESSING AS EXPECTED

## 2020-10-24 NOTE — CARE PLAN
Problem: Communication  Goal: The ability to communicate needs accurately and effectively will improve  Outcome: PROGRESSING AS EXPECTED  Intervention: [place] patient and significant other/support system to call light to alert staff of needs  Note: Pt educated on POC and medications. Pt verbalized understanding.        Problem: Safety:  Goal: Will remain free from injury  Outcome: PROGRESSING AS EXPECTED  Intervention: Provide assistance with mobility  Note: Pt bedside table and call-light are within reach, bed in lowest position and locked. Treaded socks on and pt has been educated on call light use and asked to call before getting up.

## 2020-10-24 NOTE — PROGRESS NOTES
Report received from day shift RN. Pt is in bed in lowest locked position with bed side table and call light within reach. Assessment performed. No further needs stated at this time. Pt is A&Ox4. Bed alarm on. Pt is sitting comfortably in bed with mom at bedside. Hourly rounding in place.

## 2020-10-24 NOTE — PROGRESS NOTES
Hospital Medicine Daily Progress Note    Date of Service  10/24/2020    Chief Complaint  Headache and recurrent right-sided weakness     Hospital Course  40 y.o. male with Medical history of type 2 diabetes mellitus, seizure disorder, conversion disorder admitted 10/16/2020 with headache and recurrent right-sided weakness in his upper and lower extremities.  Patient is a current resident of [facility].  Patient has multiple admissions this year for similar symptoms and work-up done she will no CVA or no intracranial abnormality.  Neurology consulted recommended IV Benadryl as part of the headache cocktail.  CT head, CTA head and neck, CT cerebral perfusion and MRI brain showed no acute abnormality.   Psychiatry  consulted , patient was seen by Sarah several times the past, with numerous recommendations for  psychotherapy/CBT for Conversion disorder. Psychiatry recommends referral back to [facility] for further outpatient psychiatric treatment.  Psychiatry and neurology signed off.  PT/OT recommended SNF.     Interval Problem Update  His right upper extremity weakness is improving slowly every day.    Consultants/Specialty  Neurology  Psychiatry    Code Status  Full Code    Disposition  Pending SNF placement    Review of Systems  Review of Systems   Constitutional: Positive for malaise/fatigue. Negative for chills.   HENT: Negative for congestion and ear discharge.    Eyes: Negative for double vision.   Respiratory: Negative for cough.    Cardiovascular: Negative for orthopnea and leg swelling.   Gastrointestinal: Positive for nausea. Negative for abdominal pain, constipation and diarrhea.   Genitourinary: Negative for frequency.   Musculoskeletal: Negative for myalgias.   Neurological: Positive for focal weakness. Negative for weakness.        Physical Exam  Temp:  [36.1 °C (96.9 °F)-37 °C (98.6 °F)] 36.1 °C (96.9 °F)  Pulse:  [52-63] 52  Resp:  [18-20] 20  BP: (110-119)/(63-95) 118/69  SpO2:  [92 %-96 %] 96  %    Physical Exam  Constitutional:       General: He is not in acute distress.     Appearance: He is obese.   HENT:      Head: Normocephalic.      Nose: Nose normal.      Mouth/Throat:      Mouth: Mucous membranes are moist.   Eyes:      Pupils: Pupils are equal, round, and reactive to light.   Neck:      Musculoskeletal: Normal range of motion and neck supple.   Cardiovascular:      Rate and Rhythm: Normal rate and regular rhythm.      Pulses: Normal pulses.   Pulmonary:      Effort: Pulmonary effort is normal.   Abdominal:      General: Bowel sounds are normal.      Palpations: Abdomen is soft.   Skin:     General: Skin is warm.   Neurological:      Mental Status: He is alert.      Motor: Weakness present.      Comments: Right upper extremity weakness 3 out of 5   Psychiatric:         Mood and Affect: Affect is flat.         Speech: Speech is delayed.         Behavior: Behavior is slowed.         Cognition and Memory: Memory is impaired.         Fluids    Intake/Output Summary (Last 24 hours) at 10/24/2020 0753  Last data filed at 10/23/2020 1924  Gross per 24 hour   Intake 360 ml   Output 450 ml   Net -90 ml       Laboratory                        Imaging  MR-BRAIN-W/O   Final Result      1.  No evidence of acute territorial infarct, intracranial hemorrhage or mass lesion.   2.  Mild diffuse cerebral substance loss.   3.  Redemonstrated diffuse confluent T2 and FLAIR signal hyperintensity in the subcortical and periventricular white matter of the bilateral cerebral hemispheres with unchanged differential consideration of dysmyelination or demyelination versus remote    toxic insult.      DX-CHEST-PORTABLE (1 VIEW)   Final Result      Hypoinflation without acute cardiopulmonary abnormality.      CT-CEREBRAL PERFUSION ANALYSIS   Final Result      1.  Cerebral blood flow less than 30% likely representing completed infarct = 0 mL.      2.  T Max more than 6 seconds likely representing combination of completed  infarct and ischemia = 0 mL.      3.  Mismatched volume likely representing ischemic brain/penumbra = None      4.  Please note that the cerebral perfusion was performed on the limited brain tissue around the basal ganglia region. Infarct/ischemia outside the CT perfusion sections can be missed in this study.      CT-CTA NECK WITH & W/O-POST PROCESSING   Final Result      No significant stenosis or dissection of the neck arteries      CT-CTA HEAD WITH & W/O-POST PROCESS   Final Result      CT angiogram of the [place] within normal limits.      CT-HEAD W/O   Final Result      1.  Stable severe nonspecific supratentorial white matter disease.   2.  No acute intracranial abnormality.           Assessment/Plan  Acute right-sided weakness- (present on admission)  Assessment & Plan  Recurrent.  Patient has history of conversion.   Improving slowly  On ASA, statin.  CT head and MRI brain no acute  Neurology signed off  Psychiatry recommend referral back to [facility] for outpatient psychiatric treatment  Fall precaution  PT/OT recommend SNF  Likely back to [facility]    Seizure disorder (HCC)  Assessment & Plan  Continue home seizure medication  Fall/seizure precaution    Type 2 diabetes mellitus with hyperglycemia, without long-term current use of insulin ([facility])- (present on admission)  Assessment & Plan  Poorly controlled with last A1c 9.5 in 6/20.  Hold oral glycemic agents.  Insulin sliding scale, Accu-Cheks, hypoglycemia protocol.    Headache- (present on admission)  Assessment & Plan  S/p Headache cocktail per neurology.   Resolved     Psychiatric problem- (present on admission)  Assessment & Plan  Bipolar disorder, Depression, PTSD.  Continue home medications.     No change of plan compared to yesterday  VTE prophylaxis: Lovenox

## 2020-10-24 NOTE — PROGRESS NOTES
Assumed pt care at 0700. Received bedside report .     Pt Alert and oriented x  4 .VSS. POC discussed and education provided on administered medications. All questions and concerns addressed. Fall precautions, seizure precautions,  hourly rounding and Q4 hour neuro checks in place.     Patient continues to endorse RUE/RLE weakness, was unable to lift either arm or leg off bed, but was witnessed to move R arm off be to play with phone and also moving R leg in bed. Patient is also able to reposition self in bed, including turning self on stomach. Some HA this morning, but did not require and PRN tylenol. No acute events this shift. Awaiting return to baseline strength so he can return to his group home.

## 2020-10-25 LAB
GLUCOSE BLD-MCNC: 162 MG/DL (ref 65–99)
GLUCOSE BLD-MCNC: 162 MG/DL (ref 65–99)
GLUCOSE BLD-MCNC: 196 MG/DL (ref 65–99)
GLUCOSE BLD-MCNC: 223 MG/DL (ref 65–99)

## 2020-10-25 PROCEDURE — 82962 GLUCOSE BLOOD TEST: CPT

## 2020-10-25 PROCEDURE — 770006 HCHG ROOM/CARE - MED/SURG/GYN SEMI*

## 2020-10-25 PROCEDURE — 700111 HCHG RX REV CODE 636 W/ 250 OVERRIDE (IP): Performed by: INTERNAL MEDICINE

## 2020-10-25 PROCEDURE — A9270 NON-COVERED ITEM OR SERVICE: HCPCS | Performed by: STUDENT IN AN ORGANIZED HEALTH CARE EDUCATION/TRAINING PROGRAM

## 2020-10-25 PROCEDURE — 700102 HCHG RX REV CODE 250 W/ 637 OVERRIDE(OP): Performed by: STUDENT IN AN ORGANIZED HEALTH CARE EDUCATION/TRAINING PROGRAM

## 2020-10-25 PROCEDURE — 99232 SBSQ HOSP IP/OBS MODERATE 35: CPT | Performed by: INTERNAL MEDICINE

## 2020-10-25 RX ADMIN — BUSPIRONE HYDROCHLORIDE 10 MG: 10 TABLET ORAL at 18:21

## 2020-10-25 RX ADMIN — BUSPIRONE HYDROCHLORIDE 10 MG: 10 TABLET ORAL at 12:10

## 2020-10-25 RX ADMIN — MONTELUKAST 10 MG: 10 TABLET, FILM COATED ORAL at 18:21

## 2020-10-25 RX ADMIN — LURASIDONE HYDROCHLORIDE 20 MG: 20 TABLET, FILM COATED ORAL at 21:19

## 2020-10-25 RX ADMIN — ENOXAPARIN SODIUM 40 MG: 40 INJECTION SUBCUTANEOUS at 06:17

## 2020-10-25 RX ADMIN — LEVOTHYROXINE SODIUM 25 MCG: 25 TABLET ORAL at 06:17

## 2020-10-25 RX ADMIN — LEVOTHYROXINE SODIUM 200 MCG: 0.2 TABLET ORAL at 06:16

## 2020-10-25 RX ADMIN — ATORVASTATIN CALCIUM 80 MG: 80 TABLET, FILM COATED ORAL at 18:21

## 2020-10-25 RX ADMIN — FENOFIBRATE 134 MG: 134 CAPSULE ORAL at 18:21

## 2020-10-25 RX ADMIN — DIVALPROEX SODIUM 500 MG: 500 TABLET, DELAYED RELEASE ORAL at 06:17

## 2020-10-25 RX ADMIN — SERTRALINE HYDROCHLORIDE 100 MG: 100 TABLET ORAL at 06:17

## 2020-10-25 RX ADMIN — TOPIRAMATE 25 MG: 25 TABLET, FILM COATED ORAL at 06:17

## 2020-10-25 RX ADMIN — ASPIRIN 81 MG: 81 TABLET, COATED ORAL at 06:16

## 2020-10-25 RX ADMIN — BUSPIRONE HYDROCHLORIDE 10 MG: 10 TABLET ORAL at 06:17

## 2020-10-25 RX ADMIN — DIVALPROEX SODIUM 1500 MG: 500 TABLET, DELAYED RELEASE ORAL at 18:20

## 2020-10-25 RX ADMIN — TRAZODONE HYDROCHLORIDE 100 MG: 100 TABLET ORAL at 20:56

## 2020-10-25 ASSESSMENT — ENCOUNTER SYMPTOMS
ORTHOPNEA: 0
NAUSEA: 1
CHILLS: 0
MYALGIAS: 0
WEAKNESS: 0
DOUBLE VISION: 0
COUGH: 0
FOCAL WEAKNESS: 1
ABDOMINAL PAIN: 0

## 2020-10-25 NOTE — PROGRESS NOTES
Hospital Medicine Daily Progress Note    Date of Service  10/25/2020    Chief Complaint  Headache and recurrent right-sided weakness     Hospital Course  40 y.o. male with Medical history of type 2 diabetes mellitus, seizure disorder, conversion disorder admitted 10/16/2020 with headache and recurrent right-sided weakness in his upper and lower extremities.  Patient is a current resident of [facility].  Patient has multiple admissions this year for similar symptoms and work-up done she will no CVA or no intracranial abnormality.  Neurology consulted recommended IV Benadryl as part of the headache cocktail.  CT head, CTA head and neck, CT cerebral perfusion and MRI brain showed no acute abnormality.   Psychiatry  consulted , patient was seen by Sarah several times the past, with numerous recommendations for  psychotherapy/CBT for Conversion disorder. Psychiatry recommends referral back to [facility] for further outpatient psychiatric treatment.  Psychiatry and neurology signed off.  PT/OT recommended SNF.     Interval Problem Update  His right upper extremity weakness is improving slowly every day.  Explained to him that  SNF has rejected his referral.    Consultants/Specialty  Neurology  Psychiatry    Code Status  Full Code    Disposition  Pending SNF placement    Review of Systems  Review of Systems   Constitutional: Positive for malaise/fatigue. Negative for chills.   HENT: Negative for congestion and ear discharge.    Eyes: Negative for double vision.   Respiratory: Negative for cough.    Cardiovascular: Negative for orthopnea and leg swelling.   Gastrointestinal: Positive for nausea. Negative for abdominal pain.   Genitourinary: Negative for frequency.   Musculoskeletal: Negative for myalgias.   Neurological: Positive for focal weakness. Negative for weakness.        Physical Exam  Temp:  [36.2 °C (97.2 °F)-36.8 °C (98.2 °F)] 36.2 °C (97.2 °F)  Pulse:  [] 55  Resp:  [17-18] 17  BP: (108-122)/(58-76)  115/64  SpO2:  [93 %-97 %] 93 %    Physical Exam  Constitutional:       General: He is not in acute distress.  HENT:      Head: Normocephalic.      Nose: Nose normal.      Mouth/Throat:      Mouth: Mucous membranes are moist.   Eyes:      Pupils: Pupils are equal, round, and reactive to light.   Neck:      Musculoskeletal: Normal range of motion and neck supple.   Cardiovascular:      Rate and Rhythm: Normal rate and regular rhythm.      Pulses: Normal pulses.   Pulmonary:      Effort: Pulmonary effort is normal.      Breath sounds: Normal breath sounds.   Abdominal:      General: Bowel sounds are normal.      Palpations: Abdomen is soft.   Neurological:      Mental Status: He is alert.      Motor: Weakness present.      Comments: Right upper extremity weakness 3 out of 5   Psychiatric:         Mood and Affect: Affect is flat.         Speech: Speech is delayed.         Behavior: Behavior is slowed.         Cognition and Memory: Memory is impaired.         Fluids    Intake/Output Summary (Last 24 hours) at 10/25/2020 0906  Last data filed at 10/24/2020 2000  Gross per 24 hour   Intake 1320 ml   Output 1650 ml   Net -330 ml       Laboratory                        Imaging  MR-BRAIN-W/O   Final Result      1.  No evidence of acute territorial infarct, intracranial hemorrhage or mass lesion.   2.  Mild diffuse cerebral substance loss.   3.  Redemonstrated diffuse confluent T2 and FLAIR signal hyperintensity in the subcortical and periventricular white matter of the bilateral cerebral hemispheres with unchanged differential consideration of dysmyelination or demyelination versus remote    toxic insult.      DX-CHEST-PORTABLE (1 VIEW)   Final Result      Hypoinflation without acute cardiopulmonary abnormality.      CT-CEREBRAL PERFUSION ANALYSIS   Final Result      1.  Cerebral blood flow less than 30% likely representing completed infarct = 0 mL.      2.  T Max more than 6 seconds likely representing combination of  completed infarct and ischemia = 0 mL.      3.  Mismatched volume likely representing ischemic brain/penumbra = None      4.  Please note that the cerebral perfusion was performed on the limited brain tissue around the basal ganglia region. Infarct/ischemia outside the CT perfusion sections can be missed in this study.      CT-CTA NECK WITH & W/O-POST PROCESSING   Final Result      No significant stenosis or dissection of the neck arteries      CT-CTA HEAD WITH & W/O-POST PROCESS   Final Result      CT angiogram of the [place] within normal limits.      CT-HEAD W/O   Final Result      1.  Stable severe nonspecific supratentorial white matter disease.   2.  No acute intracranial abnormality.           Assessment/Plan  Acute right-sided weakness- (present on admission)  Assessment & Plan  Recurrent.  Patient has history of conversion.   Improving slowly  On ASA, statin.  CT head and MRI brain no acute  Neurology signed off  Psychiatry recommend referral back to [facility] for outpatient psychiatric treatment  Fall precaution  PT/OT recommend SNF  Likely back to [facility]    Seizure disorder (HCC)  Assessment & Plan  Continue home seizure medication  Fall/seizure precaution    Type 2 diabetes mellitus with hyperglycemia, without long-term current use of insulin ([facility])- (present on admission)  Assessment & Plan  Poorly controlled with last A1c 9.5 in 6/20.  Hold oral glycemic agents.  Insulin sliding scale, Accu-Cheks, hypoglycemia protocol.    Headache- (present on admission)  Assessment & Plan  S/p Headache cocktail per neurology.   Resolved     Psychiatric problem- (present on admission)  Assessment & Plan  Bipolar disorder, Depression, PTSD.  Continue home medications.     No change of plan compared to yesterday  VTE prophylaxis: Lovenox

## 2020-10-25 NOTE — CARE PLAN
Problem: Communication  Goal: The ability to communicate needs accurately and effectively will improve  Outcome: PROGRESSING AS EXPECTED     Problem: Safety  Goal: Will remain free from injury  Outcome: PROGRESSING AS EXPECTED     Problem: Safety:  Goal: Will remain free from injury  Outcome: PROGRESSING AS EXPECTED     Problem: Mobility  Goal: Risk for activity intolerance will decrease  Outcome: PROGRESSING SLOWER THAN EXPECTED

## 2020-10-25 NOTE — DISCHARGE PLANNING
Agency/Facility Name: [facility]  Outcome: L/m to call this CCA back with status update on referral acceptance.    Agency/Facility Name: [facility]   Outcome: Unable to leave message, CCA to follow up Monday.@0902

## 2020-10-25 NOTE — PROGRESS NOTES
Assumed pt care at 0700. Received bedside report .     Pt Alert and oriented x  4 .VSS. POC discussed and education provided on administered medications. All questions and concerns addressed. Fall precautions, seizure precautions,  hourly rounding and Q4 hour neuro checks in place.      Patient is able to lift right arm off the bed on examination this shift, thought right leg unable to get off bed. Continues to report decreased sensation on right side. Have witnessed patient moving right side more in bed than seen on examination. BG stable, no acute issues this shift. Awaiting discharge planning which most likely will be back to group home.

## 2020-10-25 NOTE — PROGRESS NOTES
Bedside report received from day shift RN. Assumed care of Pt at 1915. Pt is resting in bed, bed in lowest locked position with bed side table and call light within reach. Assessment completed. No further needs stated at this time. Pt is A&Ox4. Bed alarm on. Hourly rounding in place, will continue to monitor.

## 2020-10-26 LAB
GLUCOSE BLD-MCNC: 169 MG/DL (ref 65–99)
GLUCOSE BLD-MCNC: 177 MG/DL (ref 65–99)
GLUCOSE BLD-MCNC: 210 MG/DL (ref 65–99)
GLUCOSE BLD-MCNC: 240 MG/DL (ref 65–99)

## 2020-10-26 PROCEDURE — A9270 NON-COVERED ITEM OR SERVICE: HCPCS | Performed by: STUDENT IN AN ORGANIZED HEALTH CARE EDUCATION/TRAINING PROGRAM

## 2020-10-26 PROCEDURE — 82962 GLUCOSE BLOOD TEST: CPT

## 2020-10-26 PROCEDURE — 700111 HCHG RX REV CODE 636 W/ 250 OVERRIDE (IP): Performed by: INTERNAL MEDICINE

## 2020-10-26 PROCEDURE — 97530 THERAPEUTIC ACTIVITIES: CPT

## 2020-10-26 PROCEDURE — 99232 SBSQ HOSP IP/OBS MODERATE 35: CPT | Performed by: INTERNAL MEDICINE

## 2020-10-26 PROCEDURE — 97535 SELF CARE MNGMENT TRAINING: CPT

## 2020-10-26 PROCEDURE — 770006 HCHG ROOM/CARE - MED/SURG/GYN SEMI*

## 2020-10-26 PROCEDURE — 700102 HCHG RX REV CODE 250 W/ 637 OVERRIDE(OP): Performed by: INTERNAL MEDICINE

## 2020-10-26 PROCEDURE — 700102 HCHG RX REV CODE 250 W/ 637 OVERRIDE(OP): Performed by: STUDENT IN AN ORGANIZED HEALTH CARE EDUCATION/TRAINING PROGRAM

## 2020-10-26 PROCEDURE — A9270 NON-COVERED ITEM OR SERVICE: HCPCS | Performed by: INTERNAL MEDICINE

## 2020-10-26 RX ADMIN — BUSPIRONE HYDROCHLORIDE 10 MG: 10 TABLET ORAL at 13:15

## 2020-10-26 RX ADMIN — MONTELUKAST 10 MG: 10 TABLET, FILM COATED ORAL at 17:48

## 2020-10-26 RX ADMIN — DIVALPROEX SODIUM 1500 MG: 500 TABLET, DELAYED RELEASE ORAL at 17:48

## 2020-10-26 RX ADMIN — BUSPIRONE HYDROCHLORIDE 10 MG: 10 TABLET ORAL at 04:51

## 2020-10-26 RX ADMIN — ATORVASTATIN CALCIUM 80 MG: 80 TABLET, FILM COATED ORAL at 17:48

## 2020-10-26 RX ADMIN — FENOFIBRATE 134 MG: 134 CAPSULE ORAL at 17:48

## 2020-10-26 RX ADMIN — LEVOTHYROXINE SODIUM 25 MCG: 25 TABLET ORAL at 04:52

## 2020-10-26 RX ADMIN — BUSPIRONE HYDROCHLORIDE 10 MG: 10 TABLET ORAL at 17:49

## 2020-10-26 RX ADMIN — LEVOTHYROXINE SODIUM 200 MCG: 0.2 TABLET ORAL at 04:51

## 2020-10-26 RX ADMIN — LURASIDONE HYDROCHLORIDE 20 MG: 20 TABLET, FILM COATED ORAL at 21:13

## 2020-10-26 RX ADMIN — TRAZODONE HYDROCHLORIDE 100 MG: 100 TABLET ORAL at 21:13

## 2020-10-26 RX ADMIN — DIVALPROEX SODIUM 500 MG: 500 TABLET, DELAYED RELEASE ORAL at 04:51

## 2020-10-26 RX ADMIN — TOPIRAMATE 25 MG: 25 TABLET, FILM COATED ORAL at 04:51

## 2020-10-26 RX ADMIN — ASPIRIN 81 MG: 81 TABLET, COATED ORAL at 04:51

## 2020-10-26 RX ADMIN — DOCUSATE SODIUM 50 MG AND SENNOSIDES 8.6 MG 2 TABLET: 8.6; 5 TABLET, FILM COATED ORAL at 04:51

## 2020-10-26 RX ADMIN — ENOXAPARIN SODIUM 40 MG: 40 INJECTION SUBCUTANEOUS at 04:51

## 2020-10-26 RX ADMIN — SERTRALINE HYDROCHLORIDE 100 MG: 100 TABLET ORAL at 04:51

## 2020-10-26 ASSESSMENT — ENCOUNTER SYMPTOMS
WEAKNESS: 0
FOCAL WEAKNESS: 1
MYALGIAS: 0
ORTHOPNEA: 0
COUGH: 0
DOUBLE VISION: 0
NAUSEA: 1
ABDOMINAL PAIN: 0
CHILLS: 0

## 2020-10-26 ASSESSMENT — COGNITIVE AND FUNCTIONAL STATUS - GENERAL
PERSONAL GROOMING: A LITTLE
DRESSING REGULAR LOWER BODY CLOTHING: A LOT
DRESSING REGULAR UPPER BODY CLOTHING: A LITTLE
DAILY ACTIVITIY SCORE: 17
TOILETING: A LITTLE
SUGGESTED CMS G CODE MODIFIER DAILY ACTIVITY: CK
HELP NEEDED FOR BATHING: A LOT

## 2020-10-26 NOTE — CARE PLAN
Problem: Communication  Goal: The ability to communicate needs accurately and effectively will improve  Outcome: PROGRESSING AS EXPECTED     Problem: Safety  Goal: Will remain free from falls  Outcome: PROGRESSING AS EXPECTED     Problem: Mobility:  Goal: Mobility will improve  Outcome: PROGRESSING AS EXPECTED

## 2020-10-26 NOTE — THERAPY
"Occupational Therapy  Daily Treatment     Patient Name: Norm Prabhakar  Age:  40 y.o., Sex:  male  Medical Record #: [id number]  Today's Date: 10/26/2020     Precautions  Precautions: (P) Fall Risk  Comments: (P) seizure disorder, psych history    Assessment    Pt participated in OT tx session, pt making progress in therapy on this date, pt able to functionally use RUE more throughout session. Pt still continues to be limited by overall R sided weakness, distractible tendencies, and reluctance to use RUE due to weakness. Pt requiring less overall assistance with functional mobility and transfers and able to complete more of ADLs on his own with max verbal encouragement to use RUE as able. Pt would continue to benefit from skilled therapy while admitted to address R sided deficits as well as improve overall functional independence. Will recommend post-acute placement at this time as patient needs to be completely independent to return to group home.    Plan    Continue current treatment plan.    DC Equipment Recommendations: (P) Unable to determine at this time  Discharge Recommendations: (P) Recommend post-acute placement for additional occupational therapy services prior to discharge home    Subjective    \"I had two cups of coffee today and it makes me have to pee\"     Objective       10/26/20 1250   Pain 0 - 10 Group   Location Head   Location Orientation Mid   Pain Rating Scale (NPRS) 2   Therapist Pain Assessment Post Activity Pain Same as Prior to Activity;Nurse Notified   Cognition    Orientation Level Not Oriented to Day   Level of Consciousness Alert   Comments cooperative, distractable, attempting to delay activity   Passive ROM Upper Body   Passive ROM Upper Body WDL   Active ROM Upper Body   Active ROM Upper Body  X   Dominant Hand Left   Comments increased function noted on RUE, able to bring shoulder flexion to full end range with assist as well as participate in exercises against gravity   Strength " Upper Body   Upper Body Strength  X   Comments grossly 3-/5 RUE   Sensation Upper Body   Upper Extremity Sensation  WDL   Upper Body Muscle Tone   Upper Body Muscle Tone  WDL   Sitting Upper Body Exercises   Sitting Upper Body Exercises Yes   Other Exercise AAROM in seated position in chair with RUE   Balance   Sitting Balance (Static) Good   Sitting Balance (Dynamic) Fair +   Standing Balance (Static) Fair   Standing Balance (Dynamic) Fair   Weight Shift Sitting Good   Weight Shift Standing Fair   Skilled Intervention Verbal Cuing;Compensatory Strategies   Comments w/ FWW   Bed Mobility    Supine to Sit Supervised  (used railing, HOB flat)   Scooting Supervised   Rolling Supervised   Skilled Intervention Compensatory Strategies;Verbal Cuing   Activities of Daily Living   Grooming Supervision;Seated  (predominantly used L hand)   Upper Body Dressing Minimal Assist   Lower Body Dressing Supervision  (donning socks, increased time, max encouragement to use RUE)   Skilled Intervention Compensatory Strategies;Verbal Cuing;Tactile Cuing  (cueing to use RUE, pt hesistant)   How much help from another person does the patient currently need...   Putting on and taking off regular lower body clothing? 2   Bathing (including washing, rinsing, and drying)? 2   Toileting, which includes using a toilet, bedpan, or urinal? 3   Putting on and taking off regular upper body clothing? 3   Taking care of personal grooming such as brushing teeth? 3   Eating meals? 4   6 Clicks Daily Activity Score 17   Functional Mobility   Sit to Stand Minimal Assist   Bed, Chair, Wheelchair Transfer Supervised   Transfer Method Stand Step   Mobility supine to sit, STS, steps to bedside chair   Comments FWW   Activity Tolerance   Sitting in Chair in chair at end of session   Sitting Edge of Bed 10   Standing 5   Short Term Goals   Short Term Goal # 1 SBA necessary functional transfers   Goal Outcome # 1 Progressing as expected   Short Term Goal # 2  supervised level for toileting tasks   Goal Outcome # 2 Progressing as expected   Short Term Goal # 3 parity of UE strength and function   Goal Outcome # 3 Progressing as expected   Interdisciplinary Plan of Care Collaboration   IDT Collaboration with  Nursing   Patient Position at End of Therapy Seated;Chair Alarm On;Call Light within Reach;Tray Table within Reach;Phone within Reach   Collaboration Comments aware of OT session

## 2020-10-26 NOTE — DISCHARGE PLANNING
Agency/Facility Name: [facility]  Spoke To: Melba  Outcome: Pt declined due to behaviors.    Agency/Facility Name: [facility]  Spoke To: Karuna  Outcome: Pt declined. No open beds for the next week.    Agency/Facility Name: [facility]  Spoke To: Yolis  Outcome: Both [facility] & [facility] are on lock-down due to employees testing positive for COVID. [facility] is on admissions hold for about 6 weeks.

## 2020-10-26 NOTE — PROGRESS NOTES
Assumed pt care at 0700. Received bedside report .     Pt Alert and oriented x  4 .VSS. POC discussed and education provided on administered medications. All questions and concerns addressed. Fall precautions, seizure precautions,  hourly rounding and Q4 hour neuro checks in place.      No acute events this shift. Patient worked with OT this shift, OOB to chair for about one hour. Patient was also observed to be holding right arm up while sitting, and using hand to play on his phone. Continues to await discharge planning.

## 2020-10-26 NOTE — CARE PLAN
Problem: Safety  Goal: Will remain free from injury  Outcome: PROGRESSING AS EXPECTED     Problem: Infection  Goal: Will remain free from infection  Outcome: PROGRESSING AS EXPECTED     Problem: Venous Thromboembolism (VTW)/Deep Vein Thrombosis (DVT) Prevention:  Goal: Patient will participate in Venous Thrombosis (VTE)/Deep Vein Thrombosis (DVT)Prevention Measures  Outcome: PROGRESSING AS EXPECTED     Problem: Skin Integrity  Goal: Risk for impaired skin integrity will decrease  Outcome: PROGRESSING AS EXPECTED     Problem: Safety:  Goal: Will remain free from injury  Outcome: PROGRESSING AS EXPECTED     Problem: Urinary:  Goal: Ability to maintain continence will improve  Outcome: PROGRESSING AS EXPECTED     Problem: Pain Management  Goal: Pain level will decrease to patient's comfort goal  Outcome: PROGRESSING AS EXPECTED

## 2020-10-26 NOTE — PROGRESS NOTES
Hospital Medicine Daily Progress Note    Date of Service  10/26/2020    Chief Complaint  Headache and recurrent right-sided weakness     Hospital Course  40 y.o. male with Medical history of type 2 diabetes mellitus, seizure disorder, conversion disorder admitted 10/16/2020 with headache and recurrent right-sided weakness in his upper and lower extremities.  Patient is a current resident of [facility].  Patient has multiple admissions this year for similar symptoms and work-up done she will no CVA or no intracranial abnormality.  Neurology consulted recommended IV Benadryl as part of the headache cocktail.  CT head, CTA head and neck, CT cerebral perfusion and MRI brain showed no acute abnormality.   Psychiatry  consulted , patient was seen by Sarah several times the past, with numerous recommendations for  psychotherapy/CBT for Conversion disorder. Psychiatry recommends referral back to [facility] for further outpatient psychiatric treatment.  Psychiatry and neurology signed off.  PT/OT recommended SNF.     Interval Problem Update  His right upper extremity weakness is improving slowly every day.  Plan is to discharge home with home health.    Consultants/Specialty  Neurology  Psychiatry    Code Status  Full Code    Disposition  Pending SNF placement    Review of Systems  Review of Systems   Constitutional: Positive for malaise/fatigue. Negative for chills.   HENT: Negative for ear discharge.    Eyes: Negative for double vision.   Respiratory: Negative for cough.    Cardiovascular: Negative for orthopnea and leg swelling.   Gastrointestinal: Positive for nausea. Negative for abdominal pain.   Musculoskeletal: Negative for myalgias.   Neurological: Positive for focal weakness. Negative for weakness.        Physical Exam  Temp:  [36.1 °C (97 °F)-36.7 °C (98.1 °F)] 36.1 °C (97 °F)  Pulse:  [52-60] 53  Resp:  [16-18] 16  BP: (105-114)/(57-79) 110/71  SpO2:  [93 %-98 %] 98 %    Physical Exam  Constitutional:       General:  He is not in acute distress.  HENT:      Head: Normocephalic.      Nose: Nose normal.      Mouth/Throat:      Mouth: Mucous membranes are moist.   Eyes:      Pupils: Pupils are equal, round, and reactive to light.   Neck:      Musculoskeletal: Normal range of motion.   Cardiovascular:      Rate and Rhythm: Normal rate.      Pulses: Normal pulses.   Pulmonary:      Effort: Pulmonary effort is normal.      Breath sounds: Normal breath sounds.   Abdominal:      General: Bowel sounds are normal.   Neurological:      Mental Status: He is alert.      Motor: Weakness present.      Comments: Right upper extremity weakness 3 out of 5   Psychiatric:         Mood and Affect: Affect is flat.         Speech: Speech is delayed.         Behavior: Behavior is slowed.         Cognition and Memory: Memory is impaired.         Fluids    Intake/Output Summary (Last 24 hours) at 10/26/2020 0938  Last data filed at 10/25/2020 2100  Gross per 24 hour   Intake 2000 ml   Output 1900 ml   Net 100 ml       Laboratory                        Imaging  MR-BRAIN-W/O   Final Result      1.  No evidence of acute territorial infarct, intracranial hemorrhage or mass lesion.   2.  Mild diffuse cerebral substance loss.   3.  Redemonstrated diffuse confluent T2 and FLAIR signal hyperintensity in the subcortical and periventricular white matter of the bilateral cerebral hemispheres with unchanged differential consideration of dysmyelination or demyelination versus remote    toxic insult.      DX-CHEST-PORTABLE (1 VIEW)   Final Result      Hypoinflation without acute cardiopulmonary abnormality.      CT-CEREBRAL PERFUSION ANALYSIS   Final Result      1.  Cerebral blood flow less than 30% likely representing completed infarct = 0 mL.      2.  T Max more than 6 seconds likely representing combination of completed infarct and ischemia = 0 mL.      3.  Mismatched volume likely representing ischemic brain/penumbra = None      4.  Please note that the cerebral  perfusion was performed on the limited brain tissue around the basal ganglia region. Infarct/ischemia outside the CT perfusion sections can be missed in this study.      CT-CTA NECK WITH & W/O-POST PROCESSING   Final Result      No significant stenosis or dissection of the neck arteries      CT-CTA HEAD WITH & W/O-POST PROCESS   Final Result      CT angiogram of the [place] within normal limits.      CT-HEAD W/O   Final Result      1.  Stable severe nonspecific supratentorial white matter disease.   2.  No acute intracranial abnormality.           Assessment/Plan  Acute right-sided weakness- (present on admission)  Assessment & Plan  Recurrent.  Patient has history of conversion.   Improving slowly every day  On ASA, statin.  CT head and MRI brain no acute  Neurology signed off  Psychiatry recommend referral back to [facility] for outpatient psychiatric treatment  Fall precaution  PT/OT recommend SNF  Likely back to [facility]    Seizure disorder (HCC)  Assessment & Plan  Continue home seizure medication  Fall/seizure precaution    Type 2 diabetes mellitus with hyperglycemia, without long-term current use of insulin (HCC)- (present on admission)  Assessment & Plan  Poorly controlled with last A1c 9.5 in 6/20.  Hold oral glycemic agents.  Insulin sliding scale, Accu-Cheks, hypoglycemia protocol.    Headache- (present on admission)  Assessment & Plan  S/p Headache cocktail per neurology.   Resolved     Psychiatric problem- (present on admission)  Assessment & Plan  Bipolar disorder, Depression, PTSD.  Continue home medications.     No change of plan compared to yesterday  VTE prophylaxis: Lovenox

## 2020-10-27 LAB
GLUCOSE BLD-MCNC: 135 MG/DL (ref 65–99)
GLUCOSE BLD-MCNC: 162 MG/DL (ref 65–99)
GLUCOSE BLD-MCNC: 170 MG/DL (ref 65–99)
GLUCOSE BLD-MCNC: 214 MG/DL (ref 65–99)

## 2020-10-27 PROCEDURE — 99231 SBSQ HOSP IP/OBS SF/LOW 25: CPT | Performed by: STUDENT IN AN ORGANIZED HEALTH CARE EDUCATION/TRAINING PROGRAM

## 2020-10-27 PROCEDURE — 82962 GLUCOSE BLOOD TEST: CPT | Mod: 91

## 2020-10-27 PROCEDURE — A9270 NON-COVERED ITEM OR SERVICE: HCPCS | Performed by: STUDENT IN AN ORGANIZED HEALTH CARE EDUCATION/TRAINING PROGRAM

## 2020-10-27 PROCEDURE — 92507 TX SP LANG VOICE COMM INDIV: CPT

## 2020-10-27 PROCEDURE — 700111 HCHG RX REV CODE 636 W/ 250 OVERRIDE (IP): Performed by: INTERNAL MEDICINE

## 2020-10-27 PROCEDURE — 700102 HCHG RX REV CODE 250 W/ 637 OVERRIDE(OP): Performed by: STUDENT IN AN ORGANIZED HEALTH CARE EDUCATION/TRAINING PROGRAM

## 2020-10-27 PROCEDURE — 770006 HCHG ROOM/CARE - MED/SURG/GYN SEMI*

## 2020-10-27 RX ADMIN — DIVALPROEX SODIUM 1500 MG: 500 TABLET, DELAYED RELEASE ORAL at 16:18

## 2020-10-27 RX ADMIN — MONTELUKAST 10 MG: 10 TABLET, FILM COATED ORAL at 16:18

## 2020-10-27 RX ADMIN — BUSPIRONE HYDROCHLORIDE 10 MG: 10 TABLET ORAL at 05:11

## 2020-10-27 RX ADMIN — DIVALPROEX SODIUM 500 MG: 500 TABLET, DELAYED RELEASE ORAL at 05:11

## 2020-10-27 RX ADMIN — ENOXAPARIN SODIUM 40 MG: 40 INJECTION SUBCUTANEOUS at 05:10

## 2020-10-27 RX ADMIN — BUSPIRONE HYDROCHLORIDE 10 MG: 10 TABLET ORAL at 16:18

## 2020-10-27 RX ADMIN — BUSPIRONE HYDROCHLORIDE 10 MG: 10 TABLET ORAL at 12:13

## 2020-10-27 RX ADMIN — SERTRALINE HYDROCHLORIDE 100 MG: 100 TABLET ORAL at 05:11

## 2020-10-27 RX ADMIN — ASPIRIN 81 MG: 81 TABLET, COATED ORAL at 05:11

## 2020-10-27 RX ADMIN — LEVOTHYROXINE SODIUM 200 MCG: 0.2 TABLET ORAL at 05:11

## 2020-10-27 RX ADMIN — LEVOTHYROXINE SODIUM 25 MCG: 25 TABLET ORAL at 05:11

## 2020-10-27 RX ADMIN — LURASIDONE HYDROCHLORIDE 20 MG: 20 TABLET, FILM COATED ORAL at 21:57

## 2020-10-27 RX ADMIN — TRAZODONE HYDROCHLORIDE 100 MG: 100 TABLET ORAL at 21:56

## 2020-10-27 RX ADMIN — TOPIRAMATE 25 MG: 25 TABLET, FILM COATED ORAL at 05:11

## 2020-10-27 RX ADMIN — ATORVASTATIN CALCIUM 80 MG: 80 TABLET, FILM COATED ORAL at 16:18

## 2020-10-27 RX ADMIN — FENOFIBRATE 134 MG: 134 CAPSULE ORAL at 16:18

## 2020-10-27 ASSESSMENT — ENCOUNTER SYMPTOMS
CHILLS: 0
NAUSEA: 1
ORTHOPNEA: 0
WEAKNESS: 0
DOUBLE VISION: 0
ABDOMINAL PAIN: 0
COUGH: 0
FOCAL WEAKNESS: 1
MYALGIAS: 0

## 2020-10-27 NOTE — CARE PLAN
Problem: Communication  Goal: The ability to communicate needs accurately and effectively will improve  Outcome: PROGRESSING AS EXPECTED     Problem: Safety  Goal: Will remain free from injury  Outcome: PROGRESSING AS EXPECTED  Goal: Will remain free from falls  Outcome: PROGRESSING AS EXPECTED     Problem: Infection  Goal: Will remain free from infection  Outcome: PROGRESSING AS EXPECTED     Problem: Venous Thromboembolism (VTW)/Deep Vein Thrombosis (DVT) Prevention:  Goal: Patient will participate in Venous Thrombosis (VTE)/Deep Vein Thrombosis (DVT)Prevention Measures  Outcome: PROGRESSING AS EXPECTED     Problem: Bowel/Gastric:  Goal: Normal bowel function is maintained or improved  Outcome: PROGRESSING AS EXPECTED  Goal: Will not experience complications related to bowel motility  Outcome: PROGRESSING AS EXPECTED     Problem: Knowledge Deficit  Goal: Knowledge of disease process/condition, treatment plan, diagnostic tests, and medications will improve  Outcome: PROGRESSING AS EXPECTED  Goal: Knowledge of the prescribed therapeutic regimen will improve  Outcome: PROGRESSING AS EXPECTED     Problem: Discharge Barriers/Planning  Goal: Patient's continuum of care needs will be met  Outcome: PROGRESSING AS EXPECTED     Problem: Respiratory:  Goal: Respiratory status will improve  Outcome: PROGRESSING AS EXPECTED     Problem: Skin Integrity  Goal: Risk for impaired skin integrity will decrease  Outcome: PROGRESSING AS EXPECTED     Problem: Communication:  Goal: The ability to communicate needs accurately and effectively will improve  Outcome: PROGRESSING AS EXPECTED     Problem: Safety:  Goal: Will remain free from injury  Outcome: PROGRESSING AS EXPECTED  Goal: Risk of aspiration will decrease  Outcome: PROGRESSING AS EXPECTED     Problem: Urinary:  Goal: Ability to maintain continence will improve  Outcome: PROGRESSING AS EXPECTED     Problem: Mobility:  Goal: Capacity to carry out activities will  improve  Outcome: PROGRESSING AS EXPECTED  Goal: Mobility will improve  Outcome: PROGRESSING AS EXPECTED     Problem: Knowledge Deficit:  Goal: Knowledge of disease process/condition, treatment plan, diagnostic tests, and medications will improve  Outcome: PROGRESSING AS EXPECTED  Goal: Ability to state lifestyle or environmental changes necessary to maintain safety will improve  Outcome: PROGRESSING AS EXPECTED     Problem: Pain Management  Goal: Pain level will decrease to patient's comfort goal  Outcome: PROGRESSING AS EXPECTED     Problem: Mobility  Goal: Risk for activity intolerance will decrease  Outcome: PROGRESSING AS EXPECTED

## 2020-10-27 NOTE — DISCHARGE PLANNING
Anticipated Discharge Disposition:   Group Home    Action:    Pt has been declined by all SNFs in [facility] and [place] because no Medicaid beds available.    RN CM requested Eleanor with [facility] to reconsider patient for [facility] as he has been there before and really liked the rehab there.    Pts bedroom is upstairs at group home and he is unable to walk.  Pt will need to be independent to return to group home.    PT/OT recommending post-acute placement on 10-.  Voalte msg to PT for updated PT please.    SLP recommending home health.    Contact at patient's group home is [facility] [phone].    Barriers to Discharge:    Underinsured  Stairs  Pt needs to be independent to return to group home    Plan:    Wait for OT/PT re-evaluations.

## 2020-10-27 NOTE — CARE PLAN
Problem: Safety  Goal: Will remain free from injury  Outcome: PROGRESSING AS EXPECTED  Goal: Will remain free from falls  Outcome: PROGRESSING AS EXPECTED     Problem: Skin Integrity  Goal: Risk for impaired skin integrity will decrease  Outcome: PROGRESSING AS EXPECTED     Problem: Safety:  Goal: Will remain free from injury  Outcome: PROGRESSING AS EXPECTED

## 2020-10-27 NOTE — THERAPY
Speech Language Pathology  Daily Treatment     Patient Name: Norm Prabhakar  Age:  40 y.o., Sex:  male  Medical Record #: [id number]  Today's Date: 10/27/2020       Precautions: (P) Fall Risk  Comments: seizure disorder, psych history    Assessment    Patient was seen on this date for cognitive-linguistic therapy with emphasis on cognitive reassessment and fluency therapy. Patient continues to present with stutter-like dysfluencies characterized as repetition of initial sound with intermittent brief periods of fluent speech, not baseline per patient. Patient trained on compensatory strategies for improved speech: easy onset/light touch and reduced rate. Implementation of these strategies improved speech (reduced numbers of repetitions) at the word and short phrase level. Further testing revealed orientation and STM was WNL. Mild impairment with reasoning (similarities), story retell, and auditory comprehension of short story (5/6 correct) and mild-moderate deficits with attention (recall of up to 4-digit numbers). Performance on medication management and reading comprehension with error x1.     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 MRI negative for acute intracranial abnormality w/ hx of psych. SLP will continue to follow to determine progress and carryover of functional strategies. Will benefit from more structured speech therapy in the outpatient setting if impaired speech persists.     Continue current treatment plan.    Discharge Recommendations: Recommend home health for continued speech therapy services     Objective       10/27/20 1110   Vitals   O2 Delivery Device None - Room Air   Verbal Expression   Repetition: Single Words Supervision (5)   Repetition: Phrases Supervision (5)   Repetition: Sentences Supervision (5)   Dysarthria Supervision (5)   Comments Stutter-like dysfluencies with moments of fluent speech   Auditory Comprehension    Understands Paragraph Supervision (5)   Reading Comprehension   Following Written Direction Supervision (5)   Cognitive-Linguistic   Level of Consciousness Alert   Orientation Level Oriented x 4   Short Term Memory Within Functional Limits (6-7)   Short Term Goals   Short Term Goal # 3 Pt will verbalize 2 strategies to facilitate speech fluency in conversation with minimal cues and written aide to facilitate recall.   Goal Outcome  # 3 Progressing slower than expected

## 2020-10-27 NOTE — PROGRESS NOTES
Hospital Medicine Daily Progress Note    Date of Service  10/27/2020    Chief Complaint  Headache and recurrent right-sided weakness     Hospital Course  40 y.o. male with Medical history of type 2 diabetes mellitus, seizure disorder, conversion disorder admitted 10/16/2020 with headache and recurrent right-sided weakness in his upper and lower extremities.  Patient is a current resident of [facility].  Patient has multiple admissions this year for similar symptoms and work-up done she will no CVA or no intracranial abnormality.  Neurology consulted recommended IV Benadryl as part of the headache cocktail.  CT head, CTA head and neck, CT cerebral perfusion and MRI brain showed no acute abnormality.   Psychiatry  consulted , patient was seen by Sarah several times the past, with numerous recommendations for  psychotherapy/CBT for Conversion disorder. Psychiatry recommends referral back to [facility] for further outpatient psychiatric treatment.  Psychiatry and neurology signed off.  PT/OT recommended SNF.     Interval Problem Update  No acute events overnight.  SW stating patient does not have insurance days to cover SNF or HH. Plan is to mobilize patient enough to return to prior group home.  Patient remains medically cleared.    Consultants/Specialty  Neurology  Psychiatry    Code Status  Full Code    Disposition  Pending group home placement.    Review of Systems  Review of Systems   Constitutional: Positive for malaise/fatigue. Negative for chills.   HENT: Negative for ear discharge.    Eyes: Negative for double vision.   Respiratory: Negative for cough.    Cardiovascular: Negative for orthopnea and leg swelling.   Gastrointestinal: Positive for nausea. Negative for abdominal pain.   Musculoskeletal: Negative for myalgias.   Neurological: Positive for focal weakness. Negative for weakness.        Physical Exam  Temp:  [36.4 °C (97.6 °F)-36.8 °C (98.3 °F)] 36.4 °C (97.6 °F)  Pulse:  [55-71] 59  Resp:  [16] 16  BP:  (106-118)/(49-70) 107/63  SpO2:  [92 %-96 %] 96 %    Physical Exam  Constitutional:       General: He is not in acute distress.  HENT:      Head: Normocephalic.      Nose: Nose normal.      Mouth/Throat:      Mouth: Mucous membranes are moist.   Eyes:      Pupils: Pupils are equal, round, and reactive to light.   Neck:      Musculoskeletal: Normal range of motion.   Cardiovascular:      Rate and Rhythm: Normal rate.      Pulses: Normal pulses.   Pulmonary:      Effort: Pulmonary effort is normal.      Breath sounds: Normal breath sounds.   Abdominal:      General: Bowel sounds are normal.   Neurological:      Mental Status: He is alert.      Motor: Weakness present.      Comments: Right upper extremity weakness 3 out of 5   Psychiatric:         Mood and Affect: Affect is flat.         Speech: Speech is delayed.         Behavior: Behavior is slowed.         Cognition and Memory: Memory is impaired.         Fluids    Intake/Output Summary (Last 24 hours) at 10/27/2020 1320  Last data filed at 10/27/2020 1200  Gross per 24 hour   Intake 2510 ml   Output 2250 ml   Net 260 ml       Laboratory                        Imaging  MR-BRAIN-W/O   Final Result      1.  No evidence of acute territorial infarct, intracranial hemorrhage or mass lesion.   2.  Mild diffuse cerebral substance loss.   3.  Redemonstrated diffuse confluent T2 and FLAIR signal hyperintensity in the subcortical and periventricular white matter of the bilateral cerebral hemispheres with unchanged differential consideration of dysmyelination or demyelination versus remote    toxic insult.      DX-CHEST-PORTABLE (1 VIEW)   Final Result      Hypoinflation without acute cardiopulmonary abnormality.      CT-CEREBRAL PERFUSION ANALYSIS   Final Result      1.  Cerebral blood flow less than 30% likely representing completed infarct = 0 mL.      2.  T Max more than 6 seconds likely representing combination of completed infarct and ischemia = 0 mL.      3.  Mismatched  volume likely representing ischemic brain/penumbra = None      4.  Please note that the cerebral perfusion was performed on the limited brain tissue around the basal ganglia region. Infarct/ischemia outside the CT perfusion sections can be missed in this study.      CT-CTA NECK WITH & W/O-POST PROCESSING   Final Result      No significant stenosis or dissection of the neck arteries      CT-CTA HEAD WITH & W/O-POST PROCESS   Final Result      CT angiogram of the [place] within normal limits.      CT-HEAD W/O   Final Result      1.  Stable severe nonspecific supratentorial white matter disease.   2.  No acute intracranial abnormality.           Assessment/Plan  Acute right-sided weakness- (present on admission)  Assessment & Plan  Recurrent.  Patient has history of conversion.   Improving slowly every day  On ASA, statin.  CT head and MRI brain no acute  Neurology signed off  Psychiatry recommend referral back to [facility] for outpatient psychiatric treatment  Fall precaution  PT/OT recommend SNF  Likely back to senior care    Seizure disorder (HCC)  Assessment & Plan  Continue home seizure medication  Fall/seizure precaution    Type 2 diabetes mellitus with hyperglycemia, without long-term current use of insulin (HCC)- (present on admission)  Assessment & Plan  Poorly controlled with last A1c 9.5 in 6/20.  Hold oral glycemic agents.  Insulin sliding scale, Accu-Cheks, hypoglycemia protocol.    Headache- (present on admission)  Assessment & Plan  S/p Headache cocktail per neurology.   Resolved     Psychiatric problem- (present on admission)  Assessment & Plan  Bipolar disorder, Depression, PTSD.  Continue home medications.     No change of plan compared to yesterday  VTE prophylaxis: Lovenox

## 2020-10-28 LAB
GLUCOSE BLD-MCNC: 165 MG/DL (ref 65–99)
GLUCOSE BLD-MCNC: 198 MG/DL (ref 65–99)
GLUCOSE BLD-MCNC: 227 MG/DL (ref 65–99)
GLUCOSE BLD-MCNC: 268 MG/DL (ref 65–99)

## 2020-10-28 PROCEDURE — 82962 GLUCOSE BLOOD TEST: CPT | Mod: 91

## 2020-10-28 PROCEDURE — 700111 HCHG RX REV CODE 636 W/ 250 OVERRIDE (IP): Performed by: INTERNAL MEDICINE

## 2020-10-28 PROCEDURE — 97530 THERAPEUTIC ACTIVITIES: CPT

## 2020-10-28 PROCEDURE — 97116 GAIT TRAINING THERAPY: CPT

## 2020-10-28 PROCEDURE — A9270 NON-COVERED ITEM OR SERVICE: HCPCS | Performed by: INTERNAL MEDICINE

## 2020-10-28 PROCEDURE — 700102 HCHG RX REV CODE 250 W/ 637 OVERRIDE(OP): Performed by: INTERNAL MEDICINE

## 2020-10-28 PROCEDURE — 700102 HCHG RX REV CODE 250 W/ 637 OVERRIDE(OP): Performed by: STUDENT IN AN ORGANIZED HEALTH CARE EDUCATION/TRAINING PROGRAM

## 2020-10-28 PROCEDURE — 99231 SBSQ HOSP IP/OBS SF/LOW 25: CPT | Performed by: STUDENT IN AN ORGANIZED HEALTH CARE EDUCATION/TRAINING PROGRAM

## 2020-10-28 PROCEDURE — 770006 HCHG ROOM/CARE - MED/SURG/GYN SEMI*

## 2020-10-28 PROCEDURE — A9270 NON-COVERED ITEM OR SERVICE: HCPCS | Performed by: STUDENT IN AN ORGANIZED HEALTH CARE EDUCATION/TRAINING PROGRAM

## 2020-10-28 RX ADMIN — BUSPIRONE HYDROCHLORIDE 10 MG: 10 TABLET ORAL at 04:59

## 2020-10-28 RX ADMIN — LURASIDONE HYDROCHLORIDE 20 MG: 20 TABLET, FILM COATED ORAL at 21:24

## 2020-10-28 RX ADMIN — LEVOTHYROXINE SODIUM 200 MCG: 0.2 TABLET ORAL at 05:00

## 2020-10-28 RX ADMIN — ACETAMINOPHEN 650 MG: 325 TABLET, FILM COATED ORAL at 21:34

## 2020-10-28 RX ADMIN — MONTELUKAST 10 MG: 10 TABLET, FILM COATED ORAL at 17:33

## 2020-10-28 RX ADMIN — FENOFIBRATE 134 MG: 134 CAPSULE ORAL at 17:33

## 2020-10-28 RX ADMIN — LEVOTHYROXINE SODIUM 25 MCG: 25 TABLET ORAL at 05:00

## 2020-10-28 RX ADMIN — BUSPIRONE HYDROCHLORIDE 10 MG: 10 TABLET ORAL at 17:33

## 2020-10-28 RX ADMIN — TRAZODONE HYDROCHLORIDE 100 MG: 100 TABLET ORAL at 21:24

## 2020-10-28 RX ADMIN — DOCUSATE SODIUM 50 MG AND SENNOSIDES 8.6 MG 2 TABLET: 8.6; 5 TABLET, FILM COATED ORAL at 17:33

## 2020-10-28 RX ADMIN — DIVALPROEX SODIUM 500 MG: 500 TABLET, DELAYED RELEASE ORAL at 04:59

## 2020-10-28 RX ADMIN — ATORVASTATIN CALCIUM 80 MG: 80 TABLET, FILM COATED ORAL at 17:33

## 2020-10-28 RX ADMIN — BUSPIRONE HYDROCHLORIDE 10 MG: 10 TABLET ORAL at 12:37

## 2020-10-28 RX ADMIN — ASPIRIN 81 MG: 81 TABLET, COATED ORAL at 05:00

## 2020-10-28 RX ADMIN — ENOXAPARIN SODIUM 40 MG: 40 INJECTION SUBCUTANEOUS at 04:59

## 2020-10-28 RX ADMIN — TOPIRAMATE 25 MG: 25 TABLET, FILM COATED ORAL at 04:59

## 2020-10-28 RX ADMIN — DIVALPROEX SODIUM 1500 MG: 500 TABLET, DELAYED RELEASE ORAL at 17:33

## 2020-10-28 RX ADMIN — DOCUSATE SODIUM 50 MG AND SENNOSIDES 8.6 MG 2 TABLET: 8.6; 5 TABLET, FILM COATED ORAL at 04:59

## 2020-10-28 RX ADMIN — SERTRALINE HYDROCHLORIDE 100 MG: 100 TABLET ORAL at 04:59

## 2020-10-28 ASSESSMENT — COGNITIVE AND FUNCTIONAL STATUS - GENERAL
MOBILITY SCORE: 18
WALKING IN HOSPITAL ROOM: A LITTLE
SUGGESTED CMS G CODE MODIFIER MOBILITY: CK
MOVING TO AND FROM BED TO CHAIR: A LITTLE
CLIMB 3 TO 5 STEPS WITH RAILING: A LOT
MOVING FROM LYING ON BACK TO SITTING ON SIDE OF FLAT BED: A LITTLE
STANDING UP FROM CHAIR USING ARMS: A LITTLE

## 2020-10-28 ASSESSMENT — ENCOUNTER SYMPTOMS
FOCAL WEAKNESS: 1
COUGH: 0
ABDOMINAL PAIN: 0
NAUSEA: 1
CHILLS: 0
WEAKNESS: 0
ORTHOPNEA: 0
MYALGIAS: 0
DOUBLE VISION: 0

## 2020-10-28 ASSESSMENT — GAIT ASSESSMENTS
DEVIATION: BRADYKINETIC;DECREASED HEEL STRIKE;DECREASED TOE OFF
ASSISTIVE DEVICE: FRONT WHEEL WALKER
GAIT LEVEL OF ASSIST: SUPERVISED
DISTANCE (FEET): 100

## 2020-10-28 NOTE — CARE PLAN
Problem: Safety  Goal: Will remain free from injury  Outcome: PROGRESSING AS EXPECTED  Pt remained free from falls during shift. Bed in lowest and locked position, call light within reach, and frequent rounding completed.      Problem: Communication  Goal: The ability to communicate needs accurately and effectively will improve  Outcome: PROGRESSING AS EXPECTED  Pt communicating needs well. A&Ox4 with neuro checks q4. Will continue to monitor for any s/s of cognition and communication changes during shift.      Problem: Mobility:  Goal: Capacity to carry out activities will improve  Outcome: PROGRESSING AS EXPECTED  Worked with PT during shift and tolerated well. Encouraging mobility as tolerated as it is one of the barriers to discharge. Requiring encouragement and queuing for mobility.

## 2020-10-28 NOTE — PROGRESS NOTES
Hospital Medicine Daily Progress Note    Date of Service  10/28/2020    Chief Complaint  Headache and recurrent right-sided weakness     Hospital Course  40 y.o. male with Medical history of type 2 diabetes mellitus, seizure disorder, conversion disorder admitted 10/16/2020 with headache and recurrent right-sided weakness in his upper and lower extremities.  Patient is a current resident of [facility].  Patient has multiple admissions this year for similar symptoms and work-up done she will no CVA or no intracranial abnormality.  Neurology consulted recommended IV Benadryl as part of the headache cocktail.  CT head, CTA head and neck, CT cerebral perfusion and MRI brain showed no acute abnormality.   Psychiatry  consulted , patient was seen by Sarah several times the past, with numerous recommendations for  psychotherapy/CBT for Conversion disorder. Psychiatry recommends referral back to [facility] for further outpatient psychiatric treatment.  Psychiatry and neurology signed off.  PT/OT recommended SNF.     Interval Problem Update  No acute events overnight.  Work with nursing and PT, plan to return to group home once patient is independent with mobility, needs to be able to walk up stairs at the group home.  Patient remains medically cleared.    Consultants/Specialty  Neurology  Psychiatry    Code Status  Full Code    Disposition  Pending mobility improvement, ability to walk up stairs, then discharge to group home.    Review of Systems  Review of Systems   Constitutional: Positive for malaise/fatigue. Negative for chills.   HENT: Negative for ear discharge.    Eyes: Negative for double vision.   Respiratory: Negative for cough.    Cardiovascular: Negative for orthopnea and leg swelling.   Gastrointestinal: Positive for nausea. Negative for abdominal pain.   Musculoskeletal: Negative for myalgias.   Neurological: Positive for focal weakness. Negative for weakness.        Physical Exam  Temp:  [36.4 °C (97.5 °F)-37.1  °C (98.8 °F)] 36.4 °C (97.6 °F)  Pulse:  [57-66] 57  Resp:  [16-17] 16  BP: (109-126)/(63-73) 109/63  SpO2:  [93 %-94 %] 94 %    Physical Exam  Constitutional:       General: He is not in acute distress.  HENT:      Head: Normocephalic.      Nose: Nose normal.      Mouth/Throat:      Mouth: Mucous membranes are moist.   Eyes:      Pupils: Pupils are equal, round, and reactive to light.   Neck:      Musculoskeletal: Normal range of motion.   Cardiovascular:      Rate and Rhythm: Normal rate.      Pulses: Normal pulses.   Pulmonary:      Effort: Pulmonary effort is normal.      Breath sounds: Normal breath sounds.   Abdominal:      General: Bowel sounds are normal.   Neurological:      Mental Status: He is alert.      Motor: Weakness present.      Comments: Right upper extremity weakness 3 out of 5   Psychiatric:         Mood and Affect: Affect is flat.         Speech: Speech is delayed.         Behavior: Behavior is slowed.         Cognition and Memory: Memory is impaired.         Fluids    Intake/Output Summary (Last 24 hours) at 10/28/2020 1349  Last data filed at 10/28/2020 1300  Gross per 24 hour   Intake 1820 ml   Output 1700 ml   Net 120 ml       Laboratory                        Imaging  MR-BRAIN-W/O   Final Result      1.  No evidence of acute territorial infarct, intracranial hemorrhage or mass lesion.   2.  Mild diffuse cerebral substance loss.   3.  Redemonstrated diffuse confluent T2 and FLAIR signal hyperintensity in the subcortical and periventricular white matter of the bilateral cerebral hemispheres with unchanged differential consideration of dysmyelination or demyelination versus remote    toxic insult.      DX-CHEST-PORTABLE (1 VIEW)   Final Result      Hypoinflation without acute cardiopulmonary abnormality.      CT-CEREBRAL PERFUSION ANALYSIS   Final Result      1.  Cerebral blood flow less than 30% likely representing completed infarct = 0 mL.      2.  T Max more than 6 seconds likely  representing combination of completed infarct and ischemia = 0 mL.      3.  Mismatched volume likely representing ischemic brain/penumbra = None      4.  Please note that the cerebral perfusion was performed on the limited brain tissue around the basal ganglia region. Infarct/ischemia outside the CT perfusion sections can be missed in this study.      CT-CTA NECK WITH & W/O-POST PROCESSING   Final Result      No significant stenosis or dissection of the neck arteries      CT-CTA HEAD WITH & W/O-POST PROCESS   Final Result      CT angiogram of the [place] within normal limits.      CT-HEAD W/O   Final Result      1.  Stable severe nonspecific supratentorial white matter disease.   2.  No acute intracranial abnormality.           Assessment/Plan  Acute right-sided weakness- (present on admission)  Assessment & Plan  Recurrent.  Patient has history of conversion.   Improving slowly every day  On ASA, statin.  CT head and MRI brain no acute  Neurology signed off  Psychiatry recommend referral back to [facility] for outpatient psychiatric treatment  Fall precaution  PT/OT recommend SNF  Likely back to senior care    Seizure disorder (HCC)  Assessment & Plan  Continue home seizure medication  Fall/seizure precaution    Type 2 diabetes mellitus with hyperglycemia, without long-term current use of insulin (HCC)- (present on admission)  Assessment & Plan  Poorly controlled with last A1c 9.5 in 6/20.  Hold oral glycemic agents.  Insulin sliding scale, Accu-Cheks, hypoglycemia protocol.    Headache- (present on admission)  Assessment & Plan  S/p Headache cocktail per neurology.   Resolved     Psychiatric problem- (present on admission)  Assessment & Plan  Bipolar disorder, Depression, PTSD.  Continue home medications.       VTE prophylaxis: Lovenox

## 2020-10-28 NOTE — THERAPY
Physical Therapy   Daily Treatment     Patient Name: Norm Prabhakar  Age:  40 y.o., Sex:  male  Medical Record #: [id number]  Today's Date: 10/28/2020     Precautions: (P) Fall Risk    Assessment    Pt seen for PT session, more attentive to task today. Pt demonstrated considerable improvement with global mobility. Donned pants while EOB independently and demonstrated compensatory strategy to adjust socks while sitting. Pt able to complete supine to sit and sit to stand with FWW at SPV level without overt LOB or R LE weakness. Tolerated longest walk to date, 100 ft with FWW with SPV, observed decreased knee flexion and ankle DF in swing leading to bradykinetic gait with step to pattern at times. Overall steady while ambulating. Deferred stair training today as pt finally making progress with gait, will attempt next session.     Encouraged pt to shower with nursing assist and ambulate later this evening in hallway again to continue to improve function.    Plan    Continue current treatment plan.    DC Equipment Recommendations:  Front-Wheel Walker, Unable to determine at this time  Discharge Recommendations: Placement vs home with HH- needs to practice stairs w/ or w/o AD       10/28/20 0928   Precautions   Precautions Fall Risk   Comments seizure disorder, psych history   Vitals   O2 Delivery Device None - Room Air   Pain 0 - 10 Group   Therapist Pain Assessment Nurse Notified  (no pain reported)   Cognition    Level of Consciousness Alert   Comments agreeable to work with PT   Balance   Sitting Balance (Static) Good   Sitting Balance (Dynamic) Good   Standing Balance (Static) Fair +   Standing Balance (Dynamic) Fair   Weight Shift Sitting Good   Weight Shift Standing Fair   Skilled Intervention Compensatory Strategies;Verbal Cuing   Comments w/ FWW   Gait Analysis   Gait Level Of Assist Supervised   Assistive Device Front Wheel Walker   Distance (Feet) 100   # of Times Distance was Traveled 1   Deviation  "Bradykinetic;Decreased Heel Strike;Decreased Toe Off  (dec knee flexion in swing)   # of Stairs Climbed 0   Weight Bearing Status No restrictions   Skilled Intervention Verbal Cuing   Comments longest walking distance to date. No overt LOB or R knee buckling. Difficulty during swing primarily   Bed Mobility    Supine to Sit Supervised   Sit to Supine   (no )   Scooting Supervised   Skilled Intervention Verbal Cuing   Functional Mobility   Sit to Stand Supervised  (HOB flat, light use of railing)   Bed, Chair, Wheelchair Transfer Supervised   Transfer Method Stand Step   Skilled Intervention Verbal Cuing   Patient / Family Goals    Patient / Family Goal #1 \"To get better for my job interview and wedding in November\"   Goal #1 Outcome Progressing slower than expected   Short Term Goals    Short Term Goal # 1 Pt will be able to transfer supine<>sitting with SPV in 6tx in order to return to baseline   Goal Outcome # 1 Goal met   Short Term Goal # 2 Pt will be able to complete STS with LRAD and SPV in 6tx in order to return to prior level   Goal Outcome # 2 Goal met, new goal added   Short Term Goal # 2 B  Pt will perform sit <> Stand and functional transfers without AD and SPV in 6 visits   Short Term Goal # 3 Pt will be able to ambulate 25ft with LRAD and min assist in 6tx in order to progress back to prior level   Goal Outcome # 3 Goal met, new goal added   Short Term Goal # 3 B Pt will ambulate > 200 ft with LRAD and SPV to access community needs in 6 visits   Short Term Goal # 4 Pt will be able to negotiate 3 steps with min assist in 6tx in order to progress back to FOS needed to return to housing   Goal Outcome # 4 Goal not met   Anticipated Discharge Equipment and Recommendations   DC Equipment Recommendations Front-Wheel Walker;Unable to determine at this time   Discharge Recommendations   (placement vs home with HH)     "

## 2020-10-28 NOTE — CARE PLAN
Problem: Safety  Goal: Will remain free from injury  Outcome: PROGRESSING AS EXPECTED  Goal: Will remain free from falls  Outcome: PROGRESSING AS EXPECTED     Problem: Skin Integrity  Goal: Risk for impaired skin integrity will decrease  Outcome: PROGRESSING AS EXPECTED     Problem: Safety:  Goal: Will remain free from injury  Outcome: PROGRESSING AS EXPECTED     Problem: Pain Management  Goal: Pain level will decrease to patient's comfort goal  Outcome: PROGRESSING AS EXPECTED     Problem: Mobility  Goal: Risk for activity intolerance will decrease  Outcome: PROGRESSING AS EXPECTED

## 2020-10-29 LAB
GLUCOSE BLD-MCNC: 254 MG/DL (ref 65–99)
GLUCOSE BLD-MCNC: 260 MG/DL (ref 65–99)
GLUCOSE BLD-MCNC: 331 MG/DL (ref 65–99)

## 2020-10-29 PROCEDURE — 770006 HCHG ROOM/CARE - MED/SURG/GYN SEMI*

## 2020-10-29 PROCEDURE — 700102 HCHG RX REV CODE 250 W/ 637 OVERRIDE(OP): Performed by: INTERNAL MEDICINE

## 2020-10-29 PROCEDURE — 97535 SELF CARE MNGMENT TRAINING: CPT

## 2020-10-29 PROCEDURE — 700102 HCHG RX REV CODE 250 W/ 637 OVERRIDE(OP): Performed by: STUDENT IN AN ORGANIZED HEALTH CARE EDUCATION/TRAINING PROGRAM

## 2020-10-29 PROCEDURE — 82962 GLUCOSE BLOOD TEST: CPT | Mod: 91

## 2020-10-29 PROCEDURE — 99231 SBSQ HOSP IP/OBS SF/LOW 25: CPT | Performed by: STUDENT IN AN ORGANIZED HEALTH CARE EDUCATION/TRAINING PROGRAM

## 2020-10-29 PROCEDURE — A9270 NON-COVERED ITEM OR SERVICE: HCPCS | Performed by: INTERNAL MEDICINE

## 2020-10-29 PROCEDURE — 700111 HCHG RX REV CODE 636 W/ 250 OVERRIDE (IP): Performed by: INTERNAL MEDICINE

## 2020-10-29 PROCEDURE — A9270 NON-COVERED ITEM OR SERVICE: HCPCS | Performed by: STUDENT IN AN ORGANIZED HEALTH CARE EDUCATION/TRAINING PROGRAM

## 2020-10-29 RX ADMIN — MONTELUKAST 10 MG: 10 TABLET, FILM COATED ORAL at 16:53

## 2020-10-29 RX ADMIN — ATORVASTATIN CALCIUM 80 MG: 80 TABLET, FILM COATED ORAL at 16:53

## 2020-10-29 RX ADMIN — SERTRALINE HYDROCHLORIDE 100 MG: 100 TABLET ORAL at 04:57

## 2020-10-29 RX ADMIN — LURASIDONE HYDROCHLORIDE 20 MG: 20 TABLET, FILM COATED ORAL at 20:48

## 2020-10-29 RX ADMIN — DIVALPROEX SODIUM 1500 MG: 500 TABLET, DELAYED RELEASE ORAL at 16:52

## 2020-10-29 RX ADMIN — DIVALPROEX SODIUM 500 MG: 500 TABLET, DELAYED RELEASE ORAL at 04:57

## 2020-10-29 RX ADMIN — ASPIRIN 81 MG: 81 TABLET, COATED ORAL at 04:57

## 2020-10-29 RX ADMIN — BUSPIRONE HYDROCHLORIDE 10 MG: 10 TABLET ORAL at 16:53

## 2020-10-29 RX ADMIN — DOCUSATE SODIUM 50 MG AND SENNOSIDES 8.6 MG 2 TABLET: 8.6; 5 TABLET, FILM COATED ORAL at 04:57

## 2020-10-29 RX ADMIN — LEVOTHYROXINE SODIUM 200 MCG: 0.2 TABLET ORAL at 04:57

## 2020-10-29 RX ADMIN — BUSPIRONE HYDROCHLORIDE 10 MG: 10 TABLET ORAL at 04:57

## 2020-10-29 RX ADMIN — ENOXAPARIN SODIUM 40 MG: 40 INJECTION SUBCUTANEOUS at 04:59

## 2020-10-29 RX ADMIN — ACETAMINOPHEN 650 MG: 325 TABLET, FILM COATED ORAL at 09:14

## 2020-10-29 RX ADMIN — FENOFIBRATE 134 MG: 134 CAPSULE ORAL at 16:53

## 2020-10-29 RX ADMIN — TOPIRAMATE 25 MG: 25 TABLET, FILM COATED ORAL at 04:57

## 2020-10-29 RX ADMIN — BUSPIRONE HYDROCHLORIDE 10 MG: 10 TABLET ORAL at 13:25

## 2020-10-29 RX ADMIN — LEVOTHYROXINE SODIUM 25 MCG: 25 TABLET ORAL at 04:57

## 2020-10-29 ASSESSMENT — ENCOUNTER SYMPTOMS
FOCAL WEAKNESS: 1
NAUSEA: 1
DOUBLE VISION: 0
WEAKNESS: 0
ABDOMINAL PAIN: 0
COUGH: 0
ORTHOPNEA: 0
CHILLS: 0
MYALGIAS: 0

## 2020-10-29 ASSESSMENT — COGNITIVE AND FUNCTIONAL STATUS - GENERAL
TOILETING: A LITTLE
SUGGESTED CMS G CODE MODIFIER DAILY ACTIVITY: CJ
DAILY ACTIVITIY SCORE: 21
HELP NEEDED FOR BATHING: A LITTLE
DRESSING REGULAR LOWER BODY CLOTHING: A LITTLE

## 2020-10-29 ASSESSMENT — LIFESTYLE VARIABLES
DOES PATIENT WANT TO TALK TO SOMEONE ABOUT QUITTING: NO
EVER FELT BAD OR GUILTY ABOUT YOUR DRINKING: YES
HAVE YOU EVER FELT YOU SHOULD CUT DOWN ON YOUR DRINKING: YES
TOTAL SCORE: 4
TOTAL SCORE: 4
ALCOHOL_USE: YES
AVERAGE NUMBER OF DAYS PER WEEK YOU HAVE A DRINK CONTAINING ALCOHOL: 8
EVER HAD A DRINK FIRST THING IN THE MORNING TO STEADY YOUR NERVES TO GET RID OF A HANGOVER: YES
HOW MANY TIMES IN THE PAST YEAR HAVE YOU HAD 5 OR MORE DRINKS IN A DAY: 250
CONSUMPTION TOTAL: POSITIVE
ON A TYPICAL DAY WHEN YOU DRINK ALCOHOL HOW MANY DRINKS DO YOU HAVE: 4
TOTAL SCORE: 4
HAVE PEOPLE ANNOYED YOU BY CRITICIZING YOUR DRINKING: YES
DOES PATIENT WANT TO STOP DRINKING: YES

## 2020-10-29 ASSESSMENT — PAIN DESCRIPTION - PAIN TYPE: TYPE: ACUTE PAIN

## 2020-10-29 NOTE — PROGRESS NOTES
Hospital Medicine Daily Progress Note    Date of Service  10/29/2020    Chief Complaint  Headache and recurrent right-sided weakness     Hospital Course  40 y.o. male with Medical history of type 2 diabetes mellitus, seizure disorder, conversion disorder admitted 10/16/2020 with headache and recurrent right-sided weakness in his upper and lower extremities.  Patient is a current resident of [facility] [place].  Patient has multiple admissions this year for similar symptoms and work-up done she will no CVA or no intracranial abnormality.  Neurology consulted recommended IV Benadryl as part of the headache cocktail.  CT head, CTA head and neck, CT cerebral perfusion and MRI brain showed no acute abnormality.   Psychiatry  consulted , patient was seen by Sarah several times the past, with numerous recommendations for  psychotherapy/CBT for Conversion disorder. Psychiatry recommends referral back to [facility] [place] for further outpatient psychiatric treatment.  Psychiatry and neurology signed off.  PT/OT recommended SNF.     Interval Problem Update  No acute events overnight.  Plan for discharge back to group home when able to ambulate and go up stairs independently. Will try stairs today.  Patient remains medically cleared.    Consultants/Specialty  Neurology  Psychiatry    Code Status  Full Code    Disposition  Pending mobility improvement, ability to walk up stairs, then discharge to group home.    Review of Systems  Review of Systems   Constitutional: Positive for malaise/fatigue. Negative for chills.   HENT: Negative for ear discharge.    Eyes: Negative for double vision.   Respiratory: Negative for cough.    Cardiovascular: Negative for orthopnea and leg swelling.   Gastrointestinal: Positive for nausea. Negative for abdominal pain.   Musculoskeletal: Negative for myalgias.   Neurological: Positive for focal weakness. Negative for weakness.        Physical Exam  Temp:  [36.3 °C (97.4 °F)-36.6 °C (97.9 °F)] 36.4 °C (97.5  °F)  Pulse:  [48-80] 48  Resp:  [16-18] 17  BP: (105-120)/(64-69) 120/69  SpO2:  [94 %-95 %] 94 %    Physical Exam  Constitutional:       General: He is not in acute distress.  HENT:      Head: Normocephalic.      Nose: Nose normal.      Mouth/Throat:      Mouth: Mucous membranes are moist.   Eyes:      Pupils: Pupils are equal, round, and reactive to light.   Neck:      Musculoskeletal: Normal range of motion.   Cardiovascular:      Rate and Rhythm: Normal rate.      Pulses: Normal pulses.   Pulmonary:      Effort: Pulmonary effort is normal.      Breath sounds: Normal breath sounds.   Abdominal:      General: Bowel sounds are normal.   Neurological:      Mental Status: He is alert.      Motor: Weakness present.      Comments: Right upper extremity weakness 3 out of 5   Psychiatric:         Mood and Affect: Affect is flat.         Speech: Speech is delayed.         Behavior: Behavior is slowed.         Cognition and Memory: Memory is impaired.         Fluids    Intake/Output Summary (Last 24 hours) at 10/29/2020 1253  Last data filed at 10/29/2020 1000  Gross per 24 hour   Intake 1920 ml   Output 1000 ml   Net 920 ml       Laboratory                        Imaging  MR-BRAIN-W/O   Final Result      1.  No evidence of acute territorial infarct, intracranial hemorrhage or mass lesion.   2.  Mild diffuse cerebral substance loss.   3.  Redemonstrated diffuse confluent T2 and FLAIR signal hyperintensity in the subcortical and periventricular white matter of the bilateral cerebral hemispheres with unchanged differential consideration of dysmyelination or demyelination versus remote    toxic insult.      DX-CHEST-PORTABLE (1 VIEW)   Final Result      Hypoinflation without acute cardiopulmonary abnormality.      CT-CEREBRAL PERFUSION ANALYSIS   Final Result      1.  Cerebral blood flow less than 30% likely representing completed infarct = 0 mL.      2.  T Max more than 6 seconds likely representing combination of completed  infarct and ischemia = 0 mL.      3.  Mismatched volume likely representing ischemic brain/penumbra = None      4.  Please note that the cerebral perfusion was performed on the limited brain tissue around the basal ganglia region. Infarct/ischemia outside the CT perfusion sections can be missed in this study.      CT-CTA NECK WITH & W/O-POST PROCESSING   Final Result      No significant stenosis or dissection of the neck arteries      CT-CTA HEAD WITH & W/O-POST PROCESS   Final Result      CT angiogram of the [place] within normal limits.      CT-HEAD W/O   Final Result      1.  Stable severe nonspecific supratentorial white matter disease.   2.  No acute intracranial abnormality.           Assessment/Plan  Acute right-sided weakness- (present on admission)  Assessment & Plan  Recurrent.  Patient has history of conversion.   Improving slowly every day  On ASA, statin.  CT head and MRI brain no acute  Neurology signed off  Psychiatry recommend referral back to [facility] for outpatient psychiatric treatment  Fall precaution  PT/OT recommend SNF  Likely back to [facility]    Seizure disorder (HCC)  Assessment & Plan  Continue home seizure medication  Fall/seizure precaution    Type 2 diabetes mellitus with hyperglycemia, without long-term current use of insulin ([facility])- (present on admission)  Assessment & Plan  Poorly controlled with last A1c 9.5 in 6/20.  Hold oral glycemic agents.  Insulin sliding scale, Accu-Cheks, hypoglycemia protocol.    Headache- (present on admission)  Assessment & Plan  S/p Headache cocktail per neurology.   Resolved     Psychiatric problem- (present on admission)  Assessment & Plan  Bipolar disorder, Depression, PTSD.  Continue home medications.       VTE prophylaxis: Lovenox

## 2020-10-29 NOTE — CARE PLAN
Problem: Communication  Goal: The ability to communicate needs accurately and effectively will improve  Outcome: PROGRESSING AS EXPECTED  Note: Pt slow to respond at times; however, is able to communicate effectively.      Problem: Safety  Goal: Will remain free from injury  Outcome: PROGRESSING AS EXPECTED  Note: Fall precautions in place. Pt utilizes call light.   Goal: Will remain free from falls  Outcome: PROGRESSING AS EXPECTED     Problem: Venous Thromboembolism (VTW)/Deep Vein Thrombosis (DVT) Prevention:  Goal: Patient will participate in Venous Thrombosis (VTE)/Deep Vein Thrombosis (DVT)Prevention Measures  Outcome: PROGRESSING AS EXPECTED  Note: Pt refuses scds.      Problem: Safety:  Goal: Will remain free from injury  Outcome: PROGRESSING AS EXPECTED  Note: Fall precautions in place. Pt utilizes call light.

## 2020-10-29 NOTE — THERAPY
"Occupational Therapy  Daily Treatment     Patient Name: Norm Prabhakar  Age:  40 y.o., Sex:  male  Medical Record #: [id number]  Today's Date: 10/29/2020     Precautions  Precautions: (P) Fall Risk  Comments: (P) seizure disorder    Assessment    Pt made good progress towards acute OT goals. He was able to perform toilet txf and pericare post BM, donned shorts, performed standing grooming and functional mobility at SPV level. Pt used B UE's equally during session when talking about other topics. Will continue to follow.     Plan    Continue current treatment plan.    DC Equipment Recommendations: (P) Front-Wheel Walker  Discharge Recommendations: (P) Recommend home health for continued occupational therapy services    Subjective    \"I need to get a job to pay for our wedding in November\"     Objective       10/29/20 1620   Total Time Spent   Total Time Spent (Mins) 28   Treatment Charges   Charges Yes   OT Self Care / ADL 2   Precautions   Precautions Fall Risk   Comments seizure disorder   Vitals   O2 (LPM) 0   O2 Delivery Device None - Room Air   Pain 0 - 10 Group   Therapist Pain Assessment Post Activity Pain Same as Prior to Activity;Nurse Notified  (no c/o pain during session)   Cognition    Level of Consciousness Alert   Comments cooperative w/ treatment   Active ROM Upper Body   Active ROM Upper Body  X   Dominant Hand Left   Comments used bilateral hands when grooming at sink   Fine Motor / Dexterity    Comments  filling out job application on phone upon entry, performed grooming w/ no assist   Balance   Sitting Balance (Static) Good   Sitting Balance (Dynamic) Good   Standing Balance (Static) Fair +   Standing Balance (Dynamic) Fair   Weight Shift Sitting Good   Weight Shift Standing Fair   Skilled Intervention Verbal Cuing;Tactile Cuing   Comments w/ FWW   Bed Mobility    Supine to Sit Supervised   Sit to Supine Supervised   Scooting Supervised   Skilled Intervention Verbal Cuing   Activities of Daily " Living   Grooming Supervision;Standing  (combed hair, washed face )   Upper Body Dressing Supervision   Lower Body Dressing Supervision  (donned shorts)   Toileting Supervision  (BM in toilet)   Skilled Intervention Verbal Cuing   How much help from another person does the patient currently need...   Putting on and taking off regular lower body clothing? 3   Bathing (including washing, rinsing, and drying)? 3   Toileting, which includes using a toilet, bedpan, or urinal? 3   Putting on and taking off regular upper body clothing? 4   Taking care of personal grooming such as brushing teeth? 4   Eating meals? 4   6 Clicks Daily Activity Score 21   Functional Mobility   Sit to Stand Supervised   Bed, Chair, Wheelchair Transfer Supervised   Toilet Transfers Supervised   Transfer Method Stand Step   Mobility household distance w/ FWW   Skilled Intervention Verbal Cuing   Activity Tolerance   Sitting in Chair 5 min on toilet   Sitting Edge of Bed 8 min    Standing 8 min   Patient / Family Goals   Patient / Family Goal #1 To get a job to pay for his wedding   Goal #1 Outcome Progressing as expected   Short Term Goals   Short Term Goal # 1 SBA necessary functional transfers   Goal Outcome # 1 Goal met   Short Term Goal # 2 supervised level for toileting tasks   Goal Outcome # 2 Goal met   Short Term Goal # 3 parity of UE strength and function   Goal Outcome # 3 Progressing as expected  (not consistant yet, however no deficits noted at this time)   Education Group   Role of Occupational Therapist Patient Response Patient;Acceptance;Explanation;Verbal Demonstration;Reinforcement Needed   Anticipated Discharge Equipment and Recommendations   DC Equipment Recommendations Front-Wheel Walker   Discharge Recommendations Recommend home health for continued occupational therapy services   Interdisciplinary Plan of Care Collaboration   IDT Collaboration with  Nursing   Patient Position at End of Therapy In Bed;Bed Alarm On;Call Light  within Reach;Tray Table within Reach;Phone within Reach   Collaboration Comments report given   Session Information   Date / Session Number  10/29, 4 (2/3, 10/31)   Priority 2

## 2020-10-29 NOTE — PROGRESS NOTES
Pt lying in bed, resting eyes open. Pt refusing to get up to chair, encouraged pt regarding gaining strength, pt stated he will work with PT when they come. Pt alert and oriented. No distress noted. Pt remains on RA. Will continue to monitor.

## 2020-10-29 NOTE — CARE PLAN
Problem: Communication  Goal: The ability to communicate needs accurately and effectively will improve  Outcome: PROGRESSING AS EXPECTED     Problem: Safety  Goal: Will remain free from injury  Outcome: PROGRESSING AS EXPECTED  Goal: Will remain free from falls  Outcome: PROGRESSING AS EXPECTED     Problem: Bowel/Gastric:  Goal: Normal bowel function is maintained or improved  Outcome: PROGRESSING AS EXPECTED  Note: Reinforce to pt the importance of calling staff to help with using the restroom if need     Problem: Safety:  Goal: Will remain free from injury  Outcome: PROGRESSING AS EXPECTED     Problem: Urinary:  Goal: Ability to maintain continence will improve  Outcome: PROGRESSING AS EXPECTED     Problem: Mobility:  Goal: Capacity to carry out activities will improve  Outcome: PROGRESSING AS EXPECTED

## 2020-10-30 VITALS
WEIGHT: 286.16 LBS | DIASTOLIC BLOOD PRESSURE: 102 MMHG | BODY MASS INDEX: 33.11 KG/M2 | TEMPERATURE: 97.3 F | RESPIRATION RATE: 17 BRPM | HEIGHT: 78 IN | HEART RATE: 67 BPM | OXYGEN SATURATION: 98 % | SYSTOLIC BLOOD PRESSURE: 130 MMHG

## 2020-10-30 LAB — GLUCOSE BLD-MCNC: 259 MG/DL (ref 65–99)

## 2020-10-30 PROCEDURE — A9270 NON-COVERED ITEM OR SERVICE: HCPCS | Performed by: STUDENT IN AN ORGANIZED HEALTH CARE EDUCATION/TRAINING PROGRAM

## 2020-10-30 PROCEDURE — 82962 GLUCOSE BLOOD TEST: CPT

## 2020-10-30 PROCEDURE — 700102 HCHG RX REV CODE 250 W/ 637 OVERRIDE(OP): Performed by: STUDENT IN AN ORGANIZED HEALTH CARE EDUCATION/TRAINING PROGRAM

## 2020-10-30 PROCEDURE — 99239 HOSP IP/OBS DSCHRG MGMT >30: CPT | Performed by: STUDENT IN AN ORGANIZED HEALTH CARE EDUCATION/TRAINING PROGRAM

## 2020-10-30 PROCEDURE — 97530 THERAPEUTIC ACTIVITIES: CPT | Mod: CQ

## 2020-10-30 PROCEDURE — 700111 HCHG RX REV CODE 636 W/ 250 OVERRIDE (IP): Performed by: INTERNAL MEDICINE

## 2020-10-30 PROCEDURE — 97116 GAIT TRAINING THERAPY: CPT | Mod: CQ

## 2020-10-30 RX ADMIN — BUSPIRONE HYDROCHLORIDE 10 MG: 10 TABLET ORAL at 12:17

## 2020-10-30 RX ADMIN — DIVALPROEX SODIUM 500 MG: 500 TABLET, DELAYED RELEASE ORAL at 06:08

## 2020-10-30 RX ADMIN — BUSPIRONE HYDROCHLORIDE 10 MG: 10 TABLET ORAL at 06:09

## 2020-10-30 RX ADMIN — LEVOTHYROXINE SODIUM 25 MCG: 25 TABLET ORAL at 06:09

## 2020-10-30 RX ADMIN — ASPIRIN 81 MG: 81 TABLET, COATED ORAL at 06:09

## 2020-10-30 RX ADMIN — SERTRALINE HYDROCHLORIDE 100 MG: 100 TABLET ORAL at 06:09

## 2020-10-30 RX ADMIN — LEVOTHYROXINE SODIUM 200 MCG: 0.2 TABLET ORAL at 06:08

## 2020-10-30 RX ADMIN — TOPIRAMATE 25 MG: 25 TABLET, FILM COATED ORAL at 06:09

## 2020-10-30 RX ADMIN — ENOXAPARIN SODIUM 40 MG: 40 INJECTION SUBCUTANEOUS at 06:09

## 2020-10-30 ASSESSMENT — GAIT ASSESSMENTS
DEVIATION: BRADYKINETIC;SHUFFLED GAIT
DISTANCE (FEET): 100
GAIT LEVEL OF ASSIST: SUPERVISED

## 2020-10-30 ASSESSMENT — COGNITIVE AND FUNCTIONAL STATUS - GENERAL
MOVING FROM LYING ON BACK TO SITTING ON SIDE OF FLAT BED: A LITTLE
MOBILITY SCORE: 19
WALKING IN HOSPITAL ROOM: A LITTLE
CLIMB 3 TO 5 STEPS WITH RAILING: A LITTLE
STANDING UP FROM CHAIR USING ARMS: A LITTLE
SUGGESTED CMS G CODE MODIFIER MOBILITY: CK
MOVING TO AND FROM BED TO CHAIR: A LITTLE

## 2020-10-30 NOTE — THERAPY
"Physical Therapy   Daily Treatment     Patient Name: Norm Prabhakar  Age:  40 y.o., Sex:  male  Medical Record #: [id number]  Today's Date: 10/30/2020     Precautions: (P) Fall Risk    Assessment    Pt eager to participate w/PT, wants to go home. Pt stated he is bored and needs to get ready for his wedding in November. Pt needs no assist w/bed mobility and his functional transfers. Pt managed 100' wo/AD @ supervised level and demo'd stairs w/2 rails and supervision. No LOB w/amb or stairs.   Pt does not need any DME from PT standpoint, would benefit from [facility].     Plan    PT will be available for d/c ?'s only.            Objective       10/30/20 1104   Gait Analysis   Gait Level Of Assist Supervised   Assistive Device None   Distance (Feet) 100   # of Times Distance was Traveled 1   Deviation Bradykinetic;Shuffled Gait   # of Stairs Climbed 15  (w/2 rails)   Level of Assist with Stairs Supervised   Skilled Intervention Verbal Cuing;Sequencing   Comments Pt wanting to amb wo/AD today. Pt slow but managed 100' w/no LOB. Pt did not use any DME PTA. Pt cleared on stairs, managed up/over steps x 3 using rails. Initially cued on sequencing but want to use his \"weak leg to lead to get it strong\".    Bed Mobility    Supine to Sit Supervised   Sit to Supine Supervised   Scooting Supervised   Rolling Supervised   Functional Mobility   Sit to Stand Supervised   Bed, Chair, Wheelchair Transfer Supervised   Toilet Transfers Supervised   Comments no AD   How much difficulty does the patient currently have...   Turning over in bed (including adjusting bedclothes, sheets and blankets)? 4   Sitting down on and standing up from a chair with arms (e.g., wheelchair, bedside commode, etc.) 3   Moving from lying on back to sitting on the side of the bed? 3   How much help from another person does the patient currently need...   Moving to and from a bed to a chair (including a wheelchair)? 3   Need to walk in a hospital room? 3   Climbing " 3-5 steps with a railing? 3   6 clicks Mobility Score 19   Short Term Goals    Short Term Goal # 2 B  Pt will perform sit <> Stand and functional transfers without AD and SPV in 6 visits   Goal Outcome # 2 B Goal met   Short Term Goal # 3 B Pt will ambulate > 200 ft with LRAD and SPV to access community needs in 6 visits   Goal Outcome # 3 B Progressing as expected   Short Term Goal # 4 Pt will be able to negotiate 3 steps with min assist in 6tx in order to progress back to FOS needed to return to housing   Goal Outcome # 4 Goal met

## 2020-10-30 NOTE — DISCHARGE PLANNING
Anticipated Discharge Disposition:   Group Home  [address] [facility] [place] [address]    Action:    Pt seen by OT yesterday and now supervised for ADLs and pt seen by PT today.  No DME required and cleared on stairs.      RN CM spoke with patient's , Blank Edmondson with [facility].  Provided updates.  Blank stated that patient will be scheduled to see PCP next week. No home health needed as patient with conversion disorder.    [facility] transportation set up by OMID SHOOK.  [facility] scheduled to  patient at 1445.    Bedside OMID Cohen informed via [employer].    Barriers to Discharge:    None    Plan:    DC

## 2020-10-30 NOTE — CARE PLAN
Problem: Communication  Goal: The ability to communicate needs accurately and effectively will improve  Outcome: PROGRESSING AS EXPECTED     Problem: Safety  Goal: Will remain free from injury  Outcome: PROGRESSING AS EXPECTED  Goal: Will remain free from falls  Outcome: PROGRESSING AS EXPECTED     Problem: Infection  Goal: Will remain free from infection  Outcome: PROGRESSING AS EXPECTED     Problem: Venous Thromboembolism (VTW)/Deep Vein Thrombosis (DVT) Prevention:  Goal: Patient will participate in Venous Thrombosis (VTE)/Deep Vein Thrombosis (DVT)Prevention Measures  Outcome: PROGRESSING AS EXPECTED     Problem: Bowel/Gastric:  Goal: Normal bowel function is maintained or improved  Outcome: PROGRESSING AS EXPECTED  Goal: Will not experience complications related to bowel motility  Outcome: PROGRESSING AS EXPECTED     Problem: Knowledge Deficit  Goal: Knowledge of disease process/condition, treatment plan, diagnostic tests, and medications will improve  Outcome: PROGRESSING AS EXPECTED  Goal: Knowledge of the prescribed therapeutic regimen will improve  Outcome: PROGRESSING AS EXPECTED     Problem: Discharge Barriers/Planning  Goal: Patient's continuum of care needs will be met  Outcome: PROGRESSING AS EXPECTED     Problem: Respiratory:  Goal: Respiratory status will improve  Outcome: PROGRESSING AS EXPECTED     Problem: Skin Integrity  Goal: Risk for impaired skin integrity will decrease  Outcome: PROGRESSING AS EXPECTED     Problem: Communication:  Goal: The ability to communicate needs accurately and effectively will improve  Outcome: PROGRESSING AS EXPECTED     Problem: Safety:  Goal: Will remain free from injury  Outcome: PROGRESSING AS EXPECTED  Goal: Risk of aspiration will decrease  Outcome: PROGRESSING AS EXPECTED     Problem: Urinary:  Goal: Ability to maintain continence will improve  Outcome: PROGRESSING AS EXPECTED     Problem: Mobility:  Goal: Capacity to carry out activities will  improve  Outcome: PROGRESSING AS EXPECTED  Goal: Mobility will improve  Outcome: PROGRESSING AS EXPECTED     Problem: Knowledge Deficit:  Goal: Knowledge of disease process/condition, treatment plan, diagnostic tests, and medications will improve  Outcome: PROGRESSING AS EXPECTED  Goal: Ability to state lifestyle or environmental changes necessary to maintain safety will improve  Outcome: PROGRESSING AS EXPECTED     Problem: Pain Management  Goal: Pain level will decrease to patient's comfort goal  Outcome: PROGRESSING AS EXPECTED     Problem: Mobility  Goal: Risk for activity intolerance will decrease  Outcome: PROGRESSING AS EXPECTED     Problem: Urinary Elimination:  Goal: Ability to reestablish a normal urinary elimination pattern will improve  Outcome: PROGRESSING AS EXPECTED     A/Ox4, RA, no tele or IV per MD order, continent of B/B, VSS, blood sugar elevated-coverage given, weakness improving-RLE 3/5, Up with 1 assist, no complaints of pain. RN WCTM for changes

## 2020-10-30 NOTE — DISCHARGE SUMMARY
Discharge Summary    CHIEF COMPLAINT ON ADMISSION  Chief Complaint   Patient presents with   • Possible Stroke     BIB EMS FROM alf FOR SLOW SLURRED SPEECH AND RIGHT SIDED WEAKNESS STARTING 1845.       Reason for Admission  stroke     CODE STATUS  Full Code    HPI & HOSPITAL COURSE  This is a 40 y.o. male here with right sided weakness.  40 y.o. male with Medical history of type 2 diabetes mellitus, seizure disorder, conversion disorder admitted 10/16/2020 with headache and recurrent right-sided weakness in his upper and lower extremities. Patient is a current resident of [facility].  Patient has multiple admissions this year for similar symptoms and work-up done she will no CVA or no intracranial abnormality.  Neurology consulted recommended IV Benadryl as part of the headache cocktail.  CT head, CTA head and neck, CT cerebral perfusion and MRI brain showed no acute abnormality.   Psychiatry  consulted , patient was seen by Sarah several times the past, with numerous recommendations for  psychotherapy/CBT for Conversion disorder. Psychiatry recommends referral back to [facility] for further outpatient psychiatric treatment.  Psychiatry and neurology signed off.  PT/OT recommended SNF. Patient able to improve to baseline mobility function by time of discharge. He is discharged back to group home.       Therefore, he is discharged in fair and stable condition to home with close outpatient follow-up.    The patient met 2-midnight criteria for an inpatient stay at the time of discharge.      FOLLOW UP ITEMS POST DISCHARGE  Follow up with psychiatry for psychotherapy/CBT.    DISCHARGE DIAGNOSES  Active Problems:    Acute right-sided weakness POA: Yes    Headache POA: Yes    Type 2 diabetes mellitus with hyperglycemia, without long-term current use of insulin (HCC) POA: Yes    Seizure disorder (HCC) POA: Unknown    Psychiatric problem POA: Yes      Overview: PTSD  Resolved Problems:    * No resolved hospital  problems. *      FOLLOW UP  Future Appointments   Date Time Provider Department Center   11/23/2020  8:20 AM ROSA Redd None     No follow-up provider specified.    MEDICATIONS ON DISCHARGE     Medication List      CHANGE how you take these medications      Instructions   topiramate 25 MG Tabs  What changed: when to take this  Commonly known as: TOPAMAX   Take 1 Tab by mouth 2 times a day.  Dose: 25 mg        CONTINUE taking these medications      Instructions   acetaminophen 500 MG Tabs  Commonly known as: TYLENOL   Take 1,000 mg by mouth every 6 hours as needed for Mild Pain.  Dose: 1,000 mg     aspirin EC 81 MG Tbec  Commonly known as: ECOTRIN   Take 81 mg by mouth every morning.  Dose: 81 mg     atorvastatin 80 MG tablet  Commonly known as: LIPITOR   Take 80 mg by mouth every evening.  Dose: 80 mg     budesonide-formoterol 160-4.5 MCG/ACT Aero  Commonly known as: SYMBICORT   Inhale 2 Puffs by mouth 2 Times a Day.  Dose: 2 Puff     busPIRone 10 MG Tabs tablet  Commonly known as: BUSPAR   Take 10 mg by mouth 3 times a day.  Dose: 10 mg     * divalproex 500 MG Tbec  Commonly known as: DEPAKOTE   Take 500-1,500 mg by mouth 2 Times a Day. Take 1 tab qam and 3 tabs qhs  Dose: 500-1,500 mg     * divalproex 500 MG Tbec  Commonly known as: DEPAKOTE   Take 2 Tabs by mouth every morning.  Dose: 1,000 mg     * divalproex 500 MG Tbec  Commonly known as: Depakote   Take 3 Tabs by mouth every evening.  Dose: 1,500 mg     famotidine 10 MG tablet  Commonly known as: PEPCID   Take 10 mg by mouth 2 times a day.  Dose: 10 mg     fenofibrate 130 MG capsule  Commonly known as: ANTARA   Take 130 mg by mouth every evening.  Dose: 130 mg     glimepiride 4 MG Tabs  Commonly known as: AMARYL   Take 4 mg by mouth every morning.  Dose: 4 mg     Jardiance 25 MG Tabs  Generic drug: Empagliflozin   Take 25 mg by mouth every day.  Dose: 25 mg     Latuda 20 MG Tabs  Generic drug: lurasidone   Take 20 mg by mouth every  "bedtime.  Dose: 20 mg     * levothyroxine 200 MCG Tabs  Commonly known as: SYNTHROID   Take 200 mcg by mouth Every morning on an empty stomach. Pt takes 200 mcg and 25 mcg for a total dose of 225 mcg every morning  Dose: 200 mcg     * levothyroxine 25 MCG Tabs  Commonly known as: SYNTHROID   Take 25 mcg by mouth Every morning on an empty stomach. Pt takes 200 mcg and 25 mcg for a total dose of 225 mcg every morning  Dose: 25 mcg     lisinopril 10 MG Tabs  Commonly known as: PRINIVIL   Take 10 mg by mouth every day.  Dose: 10 mg     metformin 1000 MG tablet  Commonly known as: GLUCOPHAGE   Take 1,000 mg by mouth 2 times a day.  Dose: 1,000 mg     montelukast 10 MG Tabs  Commonly known as: SINGULAIR   Take 10 mg by mouth every evening.  Dose: 10 mg     omeprazole 20 MG delayed-release capsule  Commonly known as: PRILOSEC   Take 20 mg by mouth 2 times a day.  Dose: 20 mg     prazosin 2 MG Caps  Commonly known as: MINIPRESS   Take 4 mg by mouth every evening. 2 capsules = 4 mg  Dose: 4 mg     sertraline 100 MG Tabs  Commonly known as: Zoloft   Take 100 mg by mouth every day.  Dose: 100 mg     traZODone 100 MG Tabs  Commonly known as: DESYREL   Take 100 mg by mouth at bedtime as needed.  Dose: 100 mg     vitamin D 2000 UNIT Tabs   Take 4,000 Units by mouth every bedtime. 2 tablets = 4000 units  Dose: 4,000 Units         * This list has 5 medication(s) that are the same as other medications prescribed for you. Read the directions carefully, and ask your doctor or other care provider to review them with you.                Allergies  Allergies   Allergen Reactions   • Abilify Unspecified     \"Feeling tired, like I don't even know whats going on around me\"   • Fish      Pt reports fish causes him to be sick to his stomach     • Geodon [Ziprasidone Hcl]        DIET  Orders Placed This Encounter   Procedures   • Diet Order Diabetic     Standing Status:   Standing     Number of Occurrences:   1     Order Specific Question:   " Diet:     Answer:   Diabetic [3]     Order Specific Question:   Texture Modifier     Answer:   Level 7 - Regular/Easy to Chew     Order Specific Question:   Liquid level     Answer:   Level 0 - Thin       ACTIVITY  As tolerated.  Weight bearing as tolerated    LINES, DRAINS, AND WOUNDS  This is an automated list. Peripheral IVs will be removed prior to discharge.                     MENTAL STATUS ON TRANSFER  Level of Consciousness: Alert  Orientation : Oriented x 4  Speech: Speech Clear    CONSULTATIONS  Neurology  Psychiatry    PROCEDURES  none    LABORATORY  Lab Results   Component Value Date    SODIUM 133 (L) 10/16/2020    POTASSIUM 4.0 10/16/2020    CHLORIDE 97 10/16/2020    CO2 21 10/16/2020    GLUCOSE 191 (H) 10/16/2020    BUN 30 (H) 10/16/2020    CREATININE 1.01 10/16/2020        Lab Results   Component Value Date    WBC 8.5 10/16/2020    HEMOGLOBIN 12.2 (L) 10/16/2020    HEMATOCRIT 37.6 (L) 10/16/2020    PLATELETCT 273 10/16/2020        Total time of the discharge process exceeds 34 minutes.

## 2020-10-30 NOTE — CARE PLAN
Problem: Communication  Goal: The ability to communicate needs accurately and effectively will improve  Outcome: PROGRESSING AS EXPECTED     Problem: Safety  Goal: Will remain free from injury  Outcome: PROGRESSING AS EXPECTED  Goal: Will remain free from falls  Outcome: PROGRESSING AS EXPECTED     Problem: Knowledge Deficit  Goal: Knowledge of disease process/condition, treatment plan, diagnostic tests, and medications will improve  Outcome: PROGRESSING AS EXPECTED  Goal: Knowledge of the prescribed therapeutic regimen will improve  Outcome: PROGRESSING AS EXPECTED     Problem: Discharge Barriers/Planning  Goal: Patient's continuum of care needs will be met  Outcome: PROGRESSING AS EXPECTED  Note: Discussed discharge plan with pt and MD at bedside, discussed goal to assess pt mobility on stairs to day with physical therapy, agreed on goal with MD that if physical therapy does not come to see pt by 1100 this RN will discuss need for stair mobility eval s/t discharge pending mobility independence with physical therapy     Problem: Safety:  Goal: Will remain free from injury  Outcome: PROGRESSING AS EXPECTED     Problem: Knowledge Deficit:  Goal: Knowledge of disease process/condition, treatment plan, diagnostic tests, and medications will improve  Outcome: PROGRESSING AS EXPECTED

## 2020-10-30 NOTE — PROGRESS NOTES
Pt oriented x4, denies pain. Refused mobilization to chair for breakfast but willing to get up to chair for lunch. Discussed discharge plan with pt and MD at bedside, discussed goal to assess pt mobility on stairs today with physical therapy, agreed on goal with MD that if physical therapy does not come to see pt by 1100 this RN will discuss with physical therapy need for stair mobility eval s/t discharge pending mobility independence. Bed low and locked, call light and belongings within reach, bed and chair alarm on, nonskid socks on, all needs met at this time.     Pt declined phone call to support person, stated he would update them with personal cell phone.     1330: Spoke to  Catrina regarding discharge; per Catrina, waiting for group home to confirm bed availability and transportation. Notified pt, all questions answered.     1530: discharge teaching given to pt, all questions answered. Pt discharged via wheelchair with this RN, picked up by sylvia garcia oragnized with MTM via  and [facility] care. All belongings with pt.

## 2020-10-30 NOTE — DISCHARGE INSTRUCTIONS
Discharge Instructions    Discharged to group home by medical transportation with escort. Discharged via wheelchair, hospital escort: Yes.  Special equipment needed: Not Applicable    Be sure to schedule a follow-up appointment with your primary care doctor or any specialists as instructed.     Discharge Plan:   Diet Plan: Discussed  Activity Level: Discussed  Confirmed Follow up Appointment: Appointment Scheduled  Confirmed Symptoms Management: Discussed  Medication Reconciliation Updated: Yes  Influenza Vaccine Indication: Indicated: 9 to 64 years of age  Influenza Vaccine Given - only chart on this line when given: Influenza Vaccine Given (See MAR)    I understand that a diet low in cholesterol, fat, and sodium is recommended for good health. Unless I have been given specific instructions below for another diet, I accept this instruction as my diet prescription.   Other diet: diabetic easy to chew    Special Instructions: None    · Is patient discharged on Warfarin / Coumadin?   No     Depression / Suicide Risk    As you are discharged from this [facility] facility, it is important to learn how to keep safe from harming yourself.    Recognize the warning signs:  · Abrupt changes in personality, positive or negative- including increase in energy   · Giving away possessions  · Change in eating patterns- significant weight changes-  positive or negative  · Change in sleeping patterns- unable to sleep or sleeping all the time   · Unwillingness or inability to communicate  · Depression  · Unusual sadness, discouragement and loneliness  · Talk of wanting to die  · Neglect of personal appearance   · Rebelliousness- reckless behavior  · Withdrawal from people/activities they love  · Confusion- inability to concentrate     If you or a loved one observes any of these behaviors or has concerns about self-harm, here's what you can do:  · Talk about it- your feelings and reasons for harming yourself  · Remove any means  that you might use to hurt yourself (examples: pills, rope, extension cords, firearm)  · Get professional help from the community (Mental Health, Substance Abuse, psychological counseling)  · Do not be alone:Call your Safe Contact- someone whom you trust who will be there for you.  · Call your local CRISIS HOTLINE 778-0959 or 151-881-7119  · Call your local Children's Mobile Crisis Response Team [place] (282) 618-1115 or www.MeetCast  · Call the toll free National Suicide Prevention Hotlines   · National Suicide Prevention Lifeline 401-931-LOUS (7709)  · National Hope Line Network 800-SUICIDE (749-3920)

## 2020-10-30 NOTE — PROGRESS NOTES
Pt lying in bed, alert and oriented. Pt denies needs. No distress noted. Pt remains on RA. Will continue to monitor.

## 2020-10-31 LAB — GLUCOSE BLD-MCNC: 166 MG/DL (ref 65–99)

## 2020-11-02 ENCOUNTER — PATIENT OUTREACH (OUTPATIENT)
Dept: HEALTH INFORMATION MANAGEMENT | Facility: OTHER | Age: 40
End: 2020-11-02

## 2020-11-02 NOTE — LETTER
November 2, 2020        Norm Prabhakar  [address]  [place] [address]        Dear Norm:    Welcome to [facility]! Your team of Registered Nurses, Social Workers and Pharmacists are partnered with your [facility] Providers to assist you with accessing resources and education to support your individual needs. As you work with your Care Management Team, you will be empowered to be successful with learning how to self-manage your health with the Patient Centered goals of helping you to feel better and staying out of the hospital. This program is at no cost to you and is a part of [facility]’s ongoing commitment to serve the people of our community.  Benefits of working with your Care Management Team includes:  - Comprehensive assessment by a Registered Nurse on the telephone to identify your medical and health needs.   - Telephonic review of your medications by a Care Management Pharmacist to answer any questions you may have about your medications.  - Evaluation of social needs, such as, transportation; financial; food; housing; etc. by a Care Management  to connect you with eligible resources.  - For those eligible, we will connect you with the [facility], offering access to services to assist you to manage your Congestive Heart Failure or Chronic Obstructive Pulmonary Disease in your own home.    Please contact us at [phone] to schedule an introductory call with your Registered Nurse. We are available 5 days a week, Monday through Friday from 8:00 a.m. - 5:00 p.m.    We look forward to speaking with you soon.        Sincerely,        Bailee Lopez

## 2020-11-06 NOTE — PROGRESS NOTES
11/6. CHW John made 3x attempts to follow up with Norm via TC post discharge and introduce CCM services. No return calls. Norm will be d/c from CCM services due to loss of contact.

## 2020-12-12 ENCOUNTER — HOSPITAL ENCOUNTER (EMERGENCY)
Facility: MEDICAL CENTER | Age: 40
End: 2020-12-12
Attending: EMERGENCY MEDICINE
Payer: MEDICAID

## 2020-12-12 ENCOUNTER — APPOINTMENT (OUTPATIENT)
Dept: RADIOLOGY | Facility: MEDICAL CENTER | Age: 40
End: 2020-12-12
Attending: EMERGENCY MEDICINE
Payer: MEDICAID

## 2020-12-12 VITALS
DIASTOLIC BLOOD PRESSURE: 61 MMHG | HEART RATE: 82 BPM | OXYGEN SATURATION: 95 % | HEIGHT: 78 IN | TEMPERATURE: 98 F | SYSTOLIC BLOOD PRESSURE: 115 MMHG | WEIGHT: 298.5 LBS | BODY MASS INDEX: 34.54 KG/M2 | RESPIRATION RATE: 18 BRPM

## 2020-12-12 DIAGNOSIS — M48.54XA NONTRAUMATIC COMPRESSION FRACTURE OF T12 VERTEBRA, INITIAL ENCOUNTER (HCC): ICD-10-CM

## 2020-12-12 PROCEDURE — 700102 HCHG RX REV CODE 250 W/ 637 OVERRIDE(OP): Performed by: EMERGENCY MEDICINE

## 2020-12-12 PROCEDURE — 700111 HCHG RX REV CODE 636 W/ 250 OVERRIDE (IP): Performed by: EMERGENCY MEDICINE

## 2020-12-12 PROCEDURE — 96372 THER/PROPH/DIAG INJ SC/IM: CPT

## 2020-12-12 PROCEDURE — A9270 NON-COVERED ITEM OR SERVICE: HCPCS | Performed by: EMERGENCY MEDICINE

## 2020-12-12 PROCEDURE — 99284 EMERGENCY DEPT VISIT MOD MDM: CPT

## 2020-12-12 PROCEDURE — 72131 CT LUMBAR SPINE W/O DYE: CPT

## 2020-12-12 RX ORDER — KETOROLAC TROMETHAMINE 10 MG/1
10 TABLET, FILM COATED ORAL EVERY 4 HOURS PRN
Qty: 20 TAB | Refills: 0 | Status: ON HOLD | OUTPATIENT
Start: 2020-12-12 | End: 2021-02-08

## 2020-12-12 RX ORDER — LIDOCAINE 50 MG/G
1 PATCH TOPICAL EVERY 24 HOURS
Qty: 10 PATCH | Refills: 0 | Status: SHIPPED | OUTPATIENT
Start: 2020-12-12 | End: 2021-02-16 | Stop reason: SDUPTHER

## 2020-12-12 RX ORDER — KETOROLAC TROMETHAMINE 30 MG/ML
15 INJECTION, SOLUTION INTRAMUSCULAR; INTRAVENOUS ONCE
Status: COMPLETED | OUTPATIENT
Start: 2020-12-12 | End: 2020-12-12

## 2020-12-12 RX ORDER — DIAZEPAM 2 MG/1
2 TABLET ORAL ONCE
Status: COMPLETED | OUTPATIENT
Start: 2020-12-12 | End: 2020-12-12

## 2020-12-12 RX ADMIN — KETOROLAC TROMETHAMINE 15 MG: 30 INJECTION, SOLUTION INTRAMUSCULAR at 16:20

## 2020-12-12 RX ADMIN — DIAZEPAM 2 MG: 2 TABLET ORAL at 16:20

## 2020-12-12 ASSESSMENT — FIBROSIS 4 INDEX: FIB4 SCORE: 0.66
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
## 2020-12-13 NOTE — DISCHARGE INSTRUCTIONS
You have an old compression fracture of you back: This might have recently worsened.  Please follow-up with your primary care physician and Dr. Willett of neurosurgery

## 2020-12-13 NOTE — ED NOTES
Patient discharged to home. IV discontinued. Prescriptions given to patient, verbalized understanding, consent signed and in chart. Discharge instructions given regarding follow up, medications, and activity. Education provided, patient asked questions and verbalized understanding. Discharge paperwork signed and in chart. All belongings collected. No further questions or concerns at this time.

## 2021-01-15 ENCOUNTER — HOSPITAL ENCOUNTER (EMERGENCY)
Facility: MEDICAL CENTER | Age: 41
End: 2021-01-15
Attending: EMERGENCY MEDICINE
Payer: MEDICAID

## 2021-01-15 VITALS
OXYGEN SATURATION: 95 % | HEIGHT: 78 IN | TEMPERATURE: 96.8 F | RESPIRATION RATE: 20 BRPM | WEIGHT: 280 LBS | HEART RATE: 68 BPM | DIASTOLIC BLOOD PRESSURE: 70 MMHG | BODY MASS INDEX: 32.4 KG/M2 | SYSTOLIC BLOOD PRESSURE: 115 MMHG

## 2021-01-15 DIAGNOSIS — R56.9 SEIZURE (HCC): ICD-10-CM

## 2021-01-15 LAB
ALBUMIN SERPL BCP-MCNC: 4.3 G/DL (ref 3.2–4.9)
ALBUMIN/GLOB SERPL: 2.2 G/DL
ALP SERPL-CCNC: 55 U/L (ref 30–99)
ALT SERPL-CCNC: 17 U/L (ref 2–50)
ANION GAP SERPL CALC-SCNC: 11 MMOL/L (ref 7–16)
AST SERPL-CCNC: 21 U/L (ref 12–45)
BASOPHILS # BLD AUTO: 1.1 % (ref 0–1.8)
BASOPHILS # BLD: 0.09 K/UL (ref 0–0.12)
BILIRUB SERPL-MCNC: 0.3 MG/DL (ref 0.1–1.5)
BUN SERPL-MCNC: 23 MG/DL (ref 8–22)
CALCIUM SERPL-MCNC: 9.2 MG/DL (ref 8.5–10.5)
CHLORIDE SERPL-SCNC: 101 MMOL/L (ref 96–112)
CO2 SERPL-SCNC: 23 MMOL/L (ref 20–33)
CREAT SERPL-MCNC: 0.96 MG/DL (ref 0.5–1.4)
EKG IMPRESSION: NORMAL
EOSINOPHIL # BLD AUTO: 0.17 K/UL (ref 0–0.51)
EOSINOPHIL NFR BLD: 2 % (ref 0–6.9)
ERYTHROCYTE [DISTWIDTH] IN BLOOD BY AUTOMATED COUNT: 45.9 FL (ref 35.9–50)
GLOBULIN SER CALC-MCNC: 2 G/DL (ref 1.9–3.5)
GLUCOSE SERPL-MCNC: 256 MG/DL (ref 65–99)
HCT VFR BLD AUTO: 40.4 % (ref 42–52)
HGB BLD-MCNC: 12.8 G/DL (ref 14–18)
IMM GRANULOCYTES # BLD AUTO: 0.15 K/UL (ref 0–0.11)
IMM GRANULOCYTES NFR BLD AUTO: 1.8 % (ref 0–0.9)
LYMPHOCYTES # BLD AUTO: 2.68 K/UL (ref 1–4.8)
LYMPHOCYTES NFR BLD: 31.8 % (ref 22–41)
MCH RBC QN AUTO: 28.3 PG (ref 27–33)
MCHC RBC AUTO-ENTMCNC: 31.7 G/DL (ref 33.7–35.3)
MCV RBC AUTO: 89.2 FL (ref 81.4–97.8)
MONOCYTES # BLD AUTO: 0.87 K/UL (ref 0–0.85)
MONOCYTES NFR BLD AUTO: 10.3 % (ref 0–13.4)
NEUTROPHILS # BLD AUTO: 4.48 K/UL (ref 1.82–7.42)
NEUTROPHILS NFR BLD: 53 % (ref 44–72)
NRBC # BLD AUTO: 0 K/UL
NRBC BLD-RTO: 0 /100 WBC
PLATELET # BLD AUTO: 371 K/UL (ref 164–446)
PMV BLD AUTO: 10.5 FL (ref 9–12.9)
POTASSIUM SERPL-SCNC: 4.3 MMOL/L (ref 3.6–5.5)
PROT SERPL-MCNC: 6.3 G/DL (ref 6–8.2)
RBC # BLD AUTO: 4.53 M/UL (ref 4.7–6.1)
SODIUM SERPL-SCNC: 135 MMOL/L (ref 135–145)
VALPROATE SERPL-MCNC: 13.2 UG/ML (ref 50–100)
WBC # BLD AUTO: 8.4 K/UL (ref 4.8–10.8)

## 2021-01-15 PROCEDURE — A9270 NON-COVERED ITEM OR SERVICE: HCPCS | Performed by: EMERGENCY MEDICINE

## 2021-01-15 PROCEDURE — 700102 HCHG RX REV CODE 250 W/ 637 OVERRIDE(OP): Performed by: EMERGENCY MEDICINE

## 2021-01-15 PROCEDURE — 80164 ASSAY DIPROPYLACETIC ACD TOT: CPT

## 2021-01-15 PROCEDURE — 85025 COMPLETE CBC W/AUTO DIFF WBC: CPT

## 2021-01-15 PROCEDURE — 80053 COMPREHEN METABOLIC PANEL: CPT

## 2021-01-15 PROCEDURE — 93005 ELECTROCARDIOGRAM TRACING: CPT | Performed by: EMERGENCY MEDICINE

## 2021-01-15 PROCEDURE — 99285 EMERGENCY DEPT VISIT HI MDM: CPT

## 2021-01-15 PROCEDURE — 93005 ELECTROCARDIOGRAM TRACING: CPT

## 2021-01-15 RX ORDER — IBUPROFEN 600 MG/1
600 TABLET ORAL ONCE
Status: COMPLETED | OUTPATIENT
Start: 2021-01-15 | End: 2021-01-15

## 2021-01-15 RX ADMIN — IBUPROFEN 600 MG: 600 TABLET, FILM COATED ORAL at 14:28

## 2021-01-15 RX ADMIN — VALPROIC ACID 500 MG: 250 SOLUTION ORAL at 15:50

## 2021-01-15 ASSESSMENT — FIBROSIS 4 INDEX: FIB4 SCORE: 0.66

## 2021-01-15 NOTE — Clinical Note
Norm Prabhakar was seen and treated in our emergency department on 1/15/2021.  He may return to work on 01/19/2021.       If you have any questions or concerns, please don't hesitate to call.      Herb Ortiz M.D.

## 2021-01-15 NOTE — ED NOTES
Assist RN note:  Pt assisted to sitting up and to eob. Pt c/o feeling dizzy and lightheaded. Pt was unable to stand to ambulate. Assisted to lying down. Will update primary RN and ERP.

## 2021-01-15 NOTE — DISCHARGE INSTRUCTIONS
Please talk to your doctor about possibly increasing your Depakote dose or adding a second agent given that you are having breakthrough seizures

## 2021-01-15 NOTE — ED TRIAGE NOTES
Norm Prabhakar  40 y.o. male  Chief Complaint   Patient presents with   • Seizure   • Blurred Vision   • Dizziness     Patient brought in by EMS for a self-witnessed seizure. Patient reports he was laying down when all of a sudden he started shaking and he doesn't remember anything after that. Patient reports he is compliant with his depakote medication. Patient states he has a new onset of blurred vision and dizziness since seizure episode.       FSBS 264 per EMS, history of diabetes. History of stroke with baseline right sided deficits. 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 Patient A&Ox4, pupil 3mm to right eye and reactive. 5/5 to left upper and lower extremity, 3/5 to right upper and lower extremity, tingling to right arm. No new changes per patient besides blurred vision.

## 2021-01-15 NOTE — ED NOTES
Notified ERP regarding new onset of bradycardia, heart rate 40-50's. Patient reports persistent dizziness. MD aware, no concerns per MD. Received verbal order to ambulate prior to discharge.

## 2021-01-15 NOTE — ED PROVIDER NOTES
"ED Provider Note    Scribed for Herb Ortiz M.D. by Min Meeks. 1/15/2021  1:23 PM    Primary care provider: ANIKET Davis  Means of arrival: [place]  History obtained from: Patient  History limited by: None    CHIEF COMPLAINT  Chief Complaint   Patient presents with   • Seizure   • Blurred Vision   • Dizziness       HPI  Norm Prabhakar is a 40 y.o. current every day smoker with history of anxiety, Bipolar 1 disease, depression, diabetes, and other disorders who presents to the Emergency Department for multiple \"self-witnessed\" seizures onset prior to arrival. Patient states that he was laying down when he suddenly began shaking. He adds that he does not remember anything after this event. He states that he is currently experiencing blurred vision and dizziness. Patient states that he takes Depakote and is compliant. Patient's mother states that the patient lives at [facility] and one of his seizures was in front of the staff. Patient is unsure how long his seizures last. Patient notes that this is his first seizure in multiple months. Mother states that the last time he was at [facility] was 4 months ago and he lost some sensation in his right arm. Patient denies any abdominal pain. Patient notes that he bit his tongue slightly while experiencing this seizure.   PPE Note: I personally donned full PPE for all patient encounters during this visit, including being clean-shaven with an N95 respirator mask and gloves.    Scribe remained outside the patient's room and did not have any contact with the patient for the duration of patient encounter.     REVIEW OF SYSTEMS  Pertinent negatives include no abdominal pain. As above, all other systems reviewed and are negative.   See HPI for further details.     PAST MEDICAL HISTORY   has a past medical history of Anxiety, ASTHMA, Bipolar 1 disorder (HCC), Depression, Diabetes (HCC), Fall, Glaucoma, Glaucoma (1982), Hypothyroidism, Indigestion, Mental disorder, " Murmur, Pneumonia, Psychiatric problem (), S/P thyroidectomy, Seizure (HCC) (), Seizure disorder (HCC), and Unspecified disorder of thyroid.    SURGICAL HISTORY   has a past surgical history that includes eye surgery; thyroid lobectomy; and other.    SOCIAL HISTORY  Social History     Tobacco Use   • Smoking status: Current Every Day Smoker     Packs/day: 0.25     Types: Cigarettes, Cigars     Last attempt to quit: 2020     Years since quittin.6   • Smokeless tobacco: Never Used   • Tobacco comment: 3 cigarettes a day   Substance Use Topics   • Alcohol use: Not Currently     Frequency: 2-3 times a week   • Drug use: Yes     Types: Inhaled     Comment: marijuana      Social History     Substance and Sexual Activity   Drug Use Yes   • Types: Inhaled    Comment: marijuana       FAMILY HISTORY  Family History   Problem Relation Age of Onset   • Hypertension Mother    • Heart Disease Mother    • Lung Disease Mother    • Stroke Maternal Grandmother        CURRENT MEDICATIONS  Home Medications     Reviewed by Catrina Saldaña R.N. (Registered Nurse) on 01/15/21 at 1309  Med List Status: Not Addressed   Medication Last Dose Status   acetaminophen (TYLENOL) 500 MG Tab  Active   aspirin EC (ECOTRIN) 81 MG Tablet Delayed Response  Active   atorvastatin (LIPITOR) 80 MG tablet  Active   budesonide-formoterol (SYMBICORT) 160-4.5 MCG/ACT Aerosol  Active   busPIRone (BUSPAR) 10 MG Tab tablet  Active   Cholecalciferol (VITAMIN D) 2000 UNIT Tab  Active   divalproex (DEPAKOTE) 500 MG Tablet Delayed Response  Active   divalproex (DEPAKOTE) 500 MG Tablet Delayed Response  Active   divalproex (DEPAKOTE) 500 MG Tablet Delayed Response  Active   Empagliflozin (JARDIANCE) 25 MG Tab  Active   famotidine (PEPCID) 10 MG tablet  Active   fenofibrate (ANTARA) 130 MG capsule  Active   glimepiride (AMARYL) 4 MG Tab  Active   ketorolac (TORADOL) 10 MG Tab  Active   LATUDA 20 MG Tab  Active   levothyroxine (SYNTHROID) 200 MCG Tab   "Active   levothyroxine (SYNTHROID) 25 MCG Tab  Active   lidocaine (LIDODERM) 5 % Patch  Active   lisinopril (PRINIVIL) 10 MG Tab  Active   metformin (GLUCOPHAGE) 1000 MG tablet  Active   montelukast (SINGULAIR) 10 MG Tab  Active   omeprazole (PRILOSEC) 20 MG delayed-release capsule  Active   prazosin (MINIPRESS) 2 MG Cap  Active   sertraline (ZOLOFT) 100 MG Tab  Active   topiramate (TOPAMAX) 25 MG Tab  Active   traZODone (DESYREL) 100 MG Tab  Active                ALLERGIES  Allergies   Allergen Reactions   • Geodon [Ziprasidone Hcl] Anaphylaxis     Anaphylaxis per patient   • Abilify      \"Feeling tired, like I don't even know whats going on around me\"   • Fish      Pt reports fish causes him to be sick to his stomach         PHYSICAL EXAM  VITAL SIGNS: /66   Pulse 71   Temp 36.4 °C (97.6 °F) (Temporal)   Resp 18   Ht 1.981 m (6' 6\")   Wt (!) 127 kg (280 lb)   SpO2 97%   BMI 32.36 kg/m²   Constitutional: Obese. Well developed, Well nourished, No acute distress, Non-toxic appearance.   HENT: Normocephalic, Atraumatic, Bilateral external ears normal, Oropharynx is clear mucous membranes are moist. No oral exudates or nasal discharge.   Eyes: Pupils are equal round and reactive, EOMI, Conjunctiva normal, No discharge.   Neck: Normal range of motion, No tenderness, Supple, No stridor. No meningismus.  Lymphatic: No lymphadenopathy noted.   Cardiovascular: Regular rate and rhythm without murmur rub or gallop.  Thorax & Lungs: Clear breath sounds bilaterally without wheezes, rhonchi or rales. There is no chest wall tenderness.   Abdomen: Soft non-tender non-distended. There is no rebound or guarding. No organomegaly is appreciated. Bowel sounds are normal.  Skin: Normal without rash.   Back: No CVA or spinal tenderness.   Extremities: Intact distal pulses, No edema, No tenderness, No cyanosis, No clubbing. Capillary refill is less than 2 seconds.  Musculoskeletal: Good range of motion in all major joints. No " tenderness to palpation or major deformities noted.   Neurologic: Alert & oriented x 3, Normal motor function, Normal sensory function, No focal deficits noted. Reflexes are normal.  Psychiatric: Affect normal, Judgment normal, Mood normal. There is no suicidal ideation or patient reported hallucinations.       DIAGNOSTIC STUDIES / PROCEDURES    LABS  Labs Reviewed   CBC WITH DIFFERENTIAL - Abnormal; Notable for the following components:       Result Value    RBC 4.53 (*)     Hemoglobin 12.8 (*)     Hematocrit 40.4 (*)     MCHC 31.7 (*)     Immature Granulocytes 1.80 (*)     Monos (Absolute) 0.87 (*)     Immature Granulocytes (abs) 0.15 (*)     All other components within normal limits   COMP METABOLIC PANEL - Abnormal; Notable for the following components:    Glucose 256 (*)     Bun 23 (*)     All other components within normal limits   VALPROIC ACID - Abnormal; Notable for the following components:    Valproic Acid 13.2 (*)     All other components within normal limits   ESTIMATED GFR      All labs reviewed by me.    EKG Interpretation:  Results for orders placed or performed during the hospital encounter of 01/15/21   EKG (NOW)   Result Value Ref Range    Report       [facility] Emergency Dept.    Test Date:  2021-01-15  Pt Name:    HOLLY KIM                 Department: ER  MRN:        [id number]                      Room:       [facility]  Gender:     Male                         Technician: [id number]  :        1980                   Requested By:ER TRIAGE PROTOCOL  Order #:    [id number]                    Reading MD:    Measurements  Intervals                                Axis  Rate:       65                           P:          53  MN:         172                          QRS:        38  QRSD:       112                          T:          26  QT:         396  QTc:        412    Interpretive Statements  SINUS RHYTHM  NONSPECIFIC INTRAVENTRICULAR CONDUCTION DELAY  BORDERLINE LOW VOLTAGE  IN FRONTAL LEADS  Compared to ECG 10/16/2020 22:04:01  No significant changes     Interpreted and electronically signed by Dr. Herb Ortiz.     COURSE & MEDICAL DECISION MAKING  Nursing notes, VS, PMSFHx reviewed in chart.    1:23 PM Patient seen and examined at bedside. Ordered for labs and EKG to evaluate.  No indication for imaging at this time    1:59 PM Patient is to be treated with Motrin 600 mg.    2:47 PM Patient is to be treated with Depakene 500 mg.  The patient's Depakote level is only 13 and I suspect the patient is either not taking his medication or he may need a higher dose of medication.  Serum electrolytes are unremarkable but blood glucose is elevated at 256.  There is no leukocytosis and only mild anemia    2:52 PM Patient was reevaluated at bedside. Discussed lab and radiology results with the patient and informed them that they are to be discharged. Patient verbalized his understanding and agreement with the plan of discharge.      Patient has had high blood pressure while in the emergency department, felt likely secondary to medical condition. Counseled patient to monitor blood pressure at home and follow up with primary care physician.     The patient will return for new or worsening symptoms and is stable at the time of discharge.    DISPOSITION:  Patient will be discharged home in stable condition.    FINAL IMPRESSION  1. Seizure (HCC)          Min ROB (Stephanie), am scribing for, and in the presence of, Herb Ortiz M.D..    Electronically signed by: Min Hawley), 1/15/2021    Herb ROB M.D. personally performed the services described in this documentation, as scribed by Min Meeks in my presence, and it is both accurate and complete. C    The note accurately reflects work and decisions made by me.  Herb Ortiz M.D.  1/15/2021  3:24 PM

## 2021-01-16 NOTE — ED NOTES
ERP had come to bedside. Patient was able to ambulate with ERP, gait steady with no complaints.    Discharge teaching and paperwork provided. All questions/concerns answered. VSS, assessment stable and PIV removed. Patient discharged to the care of group home and ambulated out of the ED with steady gait.    MTM called for ride home.

## 2021-01-16 NOTE — ED NOTES
Patient ate full lunch box and juice. Re-attemped to ambulate patient, patient was unable to ambulate due to dizziness. Orthostatics obtained, no changes to BP. Notified Dr. Ortiz.

## 2021-01-25 ENCOUNTER — APPOINTMENT (OUTPATIENT)
Dept: RADIOLOGY | Facility: MEDICAL CENTER | Age: 41
End: 2021-01-25
Attending: EMERGENCY MEDICINE
Payer: MEDICAID

## 2021-01-25 ENCOUNTER — HOSPITAL ENCOUNTER (EMERGENCY)
Facility: MEDICAL CENTER | Age: 41
End: 2021-01-25
Attending: EMERGENCY MEDICINE
Payer: MEDICAID

## 2021-01-25 VITALS
WEIGHT: 297.18 LBS | DIASTOLIC BLOOD PRESSURE: 74 MMHG | BODY MASS INDEX: 34.38 KG/M2 | RESPIRATION RATE: 16 BRPM | HEART RATE: 75 BPM | TEMPERATURE: 97 F | HEIGHT: 78 IN | OXYGEN SATURATION: 98 % | SYSTOLIC BLOOD PRESSURE: 101 MMHG

## 2021-01-25 DIAGNOSIS — M25.561 ACUTE PAIN OF RIGHT KNEE: ICD-10-CM

## 2021-01-25 PROCEDURE — A9270 NON-COVERED ITEM OR SERVICE: HCPCS | Performed by: EMERGENCY MEDICINE

## 2021-01-25 PROCEDURE — 700102 HCHG RX REV CODE 250 W/ 637 OVERRIDE(OP): Performed by: EMERGENCY MEDICINE

## 2021-01-25 PROCEDURE — 99285 EMERGENCY DEPT VISIT HI MDM: CPT

## 2021-01-25 PROCEDURE — 73564 X-RAY EXAM KNEE 4 OR MORE: CPT | Mod: RT

## 2021-01-25 RX ORDER — ACETAMINOPHEN 325 MG/1
975 TABLET ORAL ONCE
Status: COMPLETED | OUTPATIENT
Start: 2021-01-25 | End: 2021-01-25

## 2021-01-25 RX ORDER — IBUPROFEN 600 MG/1
600 TABLET ORAL ONCE
Status: COMPLETED | OUTPATIENT
Start: 2021-01-25 | End: 2021-01-25

## 2021-01-25 RX ADMIN — IBUPROFEN 600 MG: 600 TABLET, FILM COATED ORAL at 20:19

## 2021-01-25 RX ADMIN — ACETAMINOPHEN 975 MG: 325 TABLET, FILM COATED ORAL at 20:18

## 2021-01-25 ASSESSMENT — FIBROSIS 4 INDEX: FIB4 SCORE: 0.52

## 2021-01-26 NOTE — ED NOTES
Pt d/c from ED a/o x 4 GCS 15 ambulatory without assistance with steady gait. Pt given d/c instructions and verbalized understanding.

## 2021-01-26 NOTE — ED PROVIDER NOTES
ED Provider Note    CHIEF COMPLAINT   Chief Complaint   Patient presents with   • Knee Pain     right knee pain onset today at work, gave out causing him to fall.   • T-5000 FALL       HPI   Norm Prabhakar is a 40 y.o. male who presents to the ED secondary to knee pain.  The patient states he was at work, does not want a work to file a workers comp injury, he states his knee the knee was bothering him and then gave out.  He did not hit his head, did not pass out, no other injuries.  He has a longstanding history of knee pain secondary to previous football injuries.    REVIEW OF SYSTEMS   See HPI for further details.     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   • Hypothyroidism    • Indigestion     once in a while   • Mental disorder     learning disabilities; speech impairment; developmental delays   • Murmur     since birth   • Pneumonia     remote   • Psychiatric problem 2002    PTSD   • S/P thyroidectomy    • Seizure ([facility]) 2010   • Seizure disorder ([facility])    • Unspecified disorder of thyroid        FAMILY HISTORY  Family History   Problem Relation Age of Onset   • Hypertension Mother    • Heart Disease Mother    • Lung Disease Mother    • Stroke Maternal Grandmother        SOCIAL HISTORY  Social History     Socioeconomic History   • Marital status: Single     Spouse name: Not on file   • Number of children: Not on file   • Years of education: Not on file   • Highest education level: Not on file   Occupational History   • Not on file   Social Needs   • Financial resource strain: Somewhat hard   • Food insecurity     Worry: Sometimes true     Inability: Sometimes true   • Transportation needs     Medical: No     Non-medical: No   Tobacco Use   • Smoking status: Current Every  Day Smoker     Packs/day: 0.25     Types: Cigarettes, Cigars     Last attempt to quit: 2020     Years since quittin.6   • Smokeless tobacco: Never Used   • Tobacco comment: 3 cigarettes a day   Substance and Sexual Activity   • Alcohol use: Not Currently     Frequency: 2-3 times a week   • Drug use: Yes     Types: Inhaled     Comment: marijuana   • Sexual activity: Not on file   Lifestyle   • Physical activity     Days per week: Not on file     Minutes per session: Not on file   • Stress: Not on file   Relationships   • Social connections     Talks on phone: Not on file     Gets together: Not on file     Attends [employer] service: Not on file     Active member of club or organization: Not on file     Attends meetings of clubs or organizations: Not on file     Relationship status: Not on file   • Intimate partner violence     Fear of current or ex partner: Not on file     Emotionally abused: Not on file     Physically abused: Not on file     Forced sexual activity: Not on file   Other Topics Concern   • Not on file   Social History Narrative   • Not on file       SURGICAL HISTORY  Past Surgical History:   Procedure Laterality Date   • EYE SURGERY     • OTHER      Hernia Repair when he was 10 yrs old   • THYROID LOBECTOMY         CURRENT MEDICATIONS   Home Medications     Reviewed by Amee Mcknight R.N. (Registered Nurse) on 21 at 1902  Med List Status: Partial   Medication Last Dose Status   acetaminophen (TYLENOL) 500 MG Tab  Active   aspirin EC (ECOTRIN) 81 MG Tablet Delayed Response  Active   atorvastatin (LIPITOR) 80 MG tablet  Active   budesonide-formoterol (SYMBICORT) 160-4.5 MCG/ACT Aerosol  Active   busPIRone (BUSPAR) 10 MG Tab tablet  Active   Cholecalciferol (VITAMIN D) 2000 UNIT Tab  Active   divalproex (DEPAKOTE) 500 MG Tablet Delayed Response  Active   divalproex (DEPAKOTE) 500 MG Tablet Delayed Response  Active   divalproex (DEPAKOTE) 500 MG Tablet Delayed Response  Active  "  Empagliflozin (JARDIANCE) 25 MG Tab  Active   famotidine (PEPCID) 10 MG tablet  Active   fenofibrate (ANTARA) 130 MG capsule  Active   glimepiride (AMARYL) 4 MG Tab  Active   ketorolac (TORADOL) 10 MG Tab  Active   LATUDA 20 MG Tab  Active   levothyroxine (SYNTHROID) 200 MCG Tab  Active   levothyroxine (SYNTHROID) 25 MCG Tab  Active   lidocaine (LIDODERM) 5 % Patch  Active   lisinopril (PRINIVIL) 10 MG Tab  Active   metformin (GLUCOPHAGE) 1000 MG tablet  Active   montelukast (SINGULAIR) 10 MG Tab  Active   omeprazole (PRILOSEC) 20 MG delayed-release capsule  Active   prazosin (MINIPRESS) 2 MG Cap  Active   sertraline (ZOLOFT) 100 MG Tab  Active   topiramate (TOPAMAX) 25 MG Tab  Active   traZODone (DESYREL) 100 MG Tab  Active                ALLERGIES   Allergies   Allergen Reactions   • Geodon [Ziprasidone Hcl] Anaphylaxis     Anaphylaxis per patient   • Abilify      \"Feeling tired, like I don't even know whats going on around me\"   • Fish      Pt reports fish causes him to be sick to his stomach         PHYSICAL EXAM  VITAL SIGNS: /74   Pulse 75   Temp 36.1 °C (97 °F) (Oral)   Resp 16   Ht 1.981 m (6' 6\")   Wt (!) 134.8 kg (297 lb 2.9 oz)   SpO2 98%   BMI 34.34 kg/m²   Constitutional: Well developed, Well nourished, mild distress, Non-toxic appearance.   HENT:  Atraumatic, Normocephalic, Oral pharynx with moist mucous membranes.   Eyes: EOMI, PERRL  Cardiovascular: Good Pulses    Skin: Warm, Dry, No erythema, No rash.   Musculoskeletal: There is palpation slight soft tissue swelling, no effusion, decreased range of motion of the right knee.  2+ pulse distally.  Neurologic: Alert & oriented x 3,     RADIOLOGY/PROCEDURES  DX-KNEE COMPLETE 4+ RIGHT   Final Result         1.  No acute traumatic bony injury.            COURSE & MEDICAL DECISION MAKING  Pertinent Labs & Imaging studies reviewed. (See chart for details)  Patient with knee pain, x-rays negative, put the patient in a knee immobilizer, Tylenol " ibuprofen, have the patient follow-up with Dr. Barrera with orthopedics, have the patient return with any other concerns.        FINAL IMPRESSION  1. Acute pain of right knee        Patient referred to primary care provider for blood pressure management     This dictation was created using voice recognition software. The accuracy of the dictation is limited to the abilities of the software. I expect there may be some errors of grammar and possibly content. The nursing notes were reviewed and certain aspects of this information were incorporated into this note.    Electronically signed by: Stephen Vo M.D., 1/25/2021 8:34 PM

## 2021-01-26 NOTE — ED TRIAGE NOTES
.  Chief Complaint   Patient presents with   • Knee Pain     right knee pain onset today at work, gave out causing him to fall.   • T-5000 FALL     Ambulated to triage with above cc.   Mask applied to patient prior to triage. 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 Pt denies recent travel or contact with anyone tested positive for covid 19.

## 2021-01-28 ENCOUNTER — HOSPITAL ENCOUNTER (EMERGENCY)
Facility: MEDICAL CENTER | Age: 41
End: 2021-01-29
Attending: EMERGENCY MEDICINE
Payer: MEDICAID

## 2021-01-28 DIAGNOSIS — R42 LIGHTHEADED: ICD-10-CM

## 2021-01-28 DIAGNOSIS — T88.7XXA MEDICATION SIDE EFFECT: ICD-10-CM

## 2021-01-28 DIAGNOSIS — R11.2 NON-INTRACTABLE VOMITING WITH NAUSEA, UNSPECIFIED VOMITING TYPE: ICD-10-CM

## 2021-01-28 LAB
ALBUMIN SERPL BCP-MCNC: 4.6 G/DL (ref 3.2–4.9)
ALBUMIN/GLOB SERPL: 2 G/DL
ALP SERPL-CCNC: 52 U/L (ref 30–99)
ALT SERPL-CCNC: 16 U/L (ref 2–50)
ANION GAP SERPL CALC-SCNC: 12 MMOL/L (ref 7–16)
AST SERPL-CCNC: 22 U/L (ref 12–45)
BASOPHILS # BLD AUTO: 0.8 % (ref 0–1.8)
BASOPHILS # BLD: 0.11 K/UL (ref 0–0.12)
BILIRUB SERPL-MCNC: 0.4 MG/DL (ref 0.1–1.5)
BUN SERPL-MCNC: 19 MG/DL (ref 8–22)
CALCIUM SERPL-MCNC: 9.1 MG/DL (ref 8.5–10.5)
CHLORIDE SERPL-SCNC: 103 MMOL/L (ref 96–112)
CO2 SERPL-SCNC: 22 MMOL/L (ref 20–33)
CREAT SERPL-MCNC: 1.26 MG/DL (ref 0.5–1.4)
EKG IMPRESSION: NORMAL
EOSINOPHIL # BLD AUTO: 0.13 K/UL (ref 0–0.51)
EOSINOPHIL NFR BLD: 1 % (ref 0–6.9)
ERYTHROCYTE [DISTWIDTH] IN BLOOD BY AUTOMATED COUNT: 45.9 FL (ref 35.9–50)
GLOBULIN SER CALC-MCNC: 2.3 G/DL (ref 1.9–3.5)
GLUCOSE SERPL-MCNC: 185 MG/DL (ref 65–99)
HCT VFR BLD AUTO: 39.9 % (ref 42–52)
HGB BLD-MCNC: 12.8 G/DL (ref 14–18)
IMM GRANULOCYTES # BLD AUTO: 0.13 K/UL (ref 0–0.11)
IMM GRANULOCYTES NFR BLD AUTO: 1 % (ref 0–0.9)
LIPASE SERPL-CCNC: 16 U/L (ref 11–82)
LYMPHOCYTES # BLD AUTO: 3.5 K/UL (ref 1–4.8)
LYMPHOCYTES NFR BLD: 27 % (ref 22–41)
MCH RBC QN AUTO: 29 PG (ref 27–33)
MCHC RBC AUTO-ENTMCNC: 32.1 G/DL (ref 33.7–35.3)
MCV RBC AUTO: 90.3 FL (ref 81.4–97.8)
MONOCYTES # BLD AUTO: 1.08 K/UL (ref 0–0.85)
MONOCYTES NFR BLD AUTO: 8.3 % (ref 0–13.4)
NEUTROPHILS # BLD AUTO: 8.03 K/UL (ref 1.82–7.42)
NEUTROPHILS NFR BLD: 61.9 % (ref 44–72)
NRBC # BLD AUTO: 0 K/UL
NRBC BLD-RTO: 0 /100 WBC
PLATELET # BLD AUTO: 369 K/UL (ref 164–446)
PMV BLD AUTO: 10 FL (ref 9–12.9)
POTASSIUM SERPL-SCNC: 4.4 MMOL/L (ref 3.6–5.5)
PROT SERPL-MCNC: 6.9 G/DL (ref 6–8.2)
RBC # BLD AUTO: 4.42 M/UL (ref 4.7–6.1)
SODIUM SERPL-SCNC: 137 MMOL/L (ref 135–145)
WBC # BLD AUTO: 13 K/UL (ref 4.8–10.8)

## 2021-01-28 PROCEDURE — 83690 ASSAY OF LIPASE: CPT

## 2021-01-28 PROCEDURE — 80053 COMPREHEN METABOLIC PANEL: CPT

## 2021-01-28 PROCEDURE — 99285 EMERGENCY DEPT VISIT HI MDM: CPT

## 2021-01-28 PROCEDURE — 85025 COMPLETE CBC W/AUTO DIFF WBC: CPT

## 2021-01-28 PROCEDURE — 93005 ELECTROCARDIOGRAM TRACING: CPT | Performed by: EMERGENCY MEDICINE

## 2021-01-28 PROCEDURE — 96375 TX/PRO/DX INJ NEW DRUG ADDON: CPT

## 2021-01-28 PROCEDURE — 93005 ELECTROCARDIOGRAM TRACING: CPT

## 2021-01-28 PROCEDURE — 96374 THER/PROPH/DIAG INJ IV PUSH: CPT

## 2021-01-28 PROCEDURE — 700111 HCHG RX REV CODE 636 W/ 250 OVERRIDE (IP): Performed by: EMERGENCY MEDICINE

## 2021-01-28 PROCEDURE — 96372 THER/PROPH/DIAG INJ SC/IM: CPT

## 2021-01-28 RX ORDER — DICYCLOMINE HYDROCHLORIDE 10 MG/ML
20 INJECTION INTRAMUSCULAR ONCE
Status: COMPLETED | OUTPATIENT
Start: 2021-01-28 | End: 2021-01-28

## 2021-01-28 RX ORDER — METOCLOPRAMIDE HYDROCHLORIDE 5 MG/ML
10 INJECTION INTRAMUSCULAR; INTRAVENOUS ONCE
Status: COMPLETED | OUTPATIENT
Start: 2021-01-28 | End: 2021-01-28

## 2021-01-28 RX ORDER — KETOROLAC TROMETHAMINE 30 MG/ML
15 INJECTION, SOLUTION INTRAMUSCULAR; INTRAVENOUS ONCE
Status: COMPLETED | OUTPATIENT
Start: 2021-01-28 | End: 2021-01-28

## 2021-01-28 RX ADMIN — METOCLOPRAMIDE 10 MG: 5 INJECTION, SOLUTION INTRAMUSCULAR; INTRAVENOUS at 23:38

## 2021-01-28 RX ADMIN — DICYCLOMINE HYDROCHLORIDE 20 MG: 20 INJECTION, SOLUTION INTRAMUSCULAR at 23:39

## 2021-01-28 RX ADMIN — KETOROLAC TROMETHAMINE 15 MG: 30 INJECTION, SOLUTION INTRAMUSCULAR at 23:37

## 2021-01-28 ASSESSMENT — FIBROSIS 4 INDEX: FIB4 SCORE: 0.52

## 2021-01-28 NOTE — Clinical Note
Norm Prabhakar was seen and treated in our emergency department on 1/28/2021.  He may return to work on 01/30/2021.       If you have any questions or concerns, please don't hesitate to call.      Daniela Stephen M.D.

## 2021-01-29 VITALS
DIASTOLIC BLOOD PRESSURE: 61 MMHG | SYSTOLIC BLOOD PRESSURE: 102 MMHG | TEMPERATURE: 97.2 F | HEART RATE: 64 BPM | RESPIRATION RATE: 16 BRPM | OXYGEN SATURATION: 90 % | BODY MASS INDEX: 34.36 KG/M2 | WEIGHT: 297 LBS | HEIGHT: 78 IN

## 2021-01-29 RX ORDER — ONDANSETRON 4 MG/1
4 TABLET, ORALLY DISINTEGRATING ORAL EVERY 6 HOURS PRN
Qty: 8 TAB | Refills: 0 | Status: SHIPPED | OUTPATIENT
Start: 2021-01-29 | End: 2021-08-30

## 2021-01-29 NOTE — ED TRIAGE NOTES
"Norm Prabhakar  40 y.o. male  Chief Complaint   Patient presents with   • Near Syncopal     Pt reports weakness, N/V starting at 19:00. On his walk home he felt like he was going to pass out and had to position himself against a bench for stability. Pt reports trulicity as a new medication started yesterday. FSBS 200 for EMS   • N/V     1 episode of emesis     Pt BIB EMS for above complaint.     Negative COVID screening.     EKG obtained, blood drawn and tubed to lab.     Blood Pressure: 107/68, Pulse: 80, Respiration: 18, Temperature: 36.2 °C (97.2 °F), Height: 198.1 cm (6' 6\"), Weight: (!) 135 kg (297 lb), BMI (Calculated): 34.32, BSA (Calculated): 2.7, Pulse Oximetry: 95 %, O2 (LPM): 0, O2 Delivery Device: None - Room Air    "

## 2021-01-29 NOTE — ED PROVIDER NOTES
ED Provider Note    CHIEF COMPLAINT  Chief Complaint   Patient presents with   • Near Syncopal     Pt reports weakness, N/V starting at 19:00. On his walk home he felt like he was going to pass out and had to position himself against a bench for stability. Pt reports trulicity as a new medication started yesterday. FSBS 200 for EMS   • N/V     1 episode of emesis       HPI  Norm Prabhakar is a 40 y.o. male who presents to the emergency department chief complaint of just generally feeling unwell.  The patient got his first shot of Trulicity yesterday for his uncontrolled diabetes.  He states today just felt generally unwell some abdominal cramping some nausea and some vomiting and lightheadedness he states he was walking home from work as he left early because he was not feeling well and he got lightheaded he leaned on something for support but did not pass out.  This was not associated with chest pain or shortness of breath there is no bloody stools or bloody emesis no fevers chills cough or congestion.  EMS reports his Accu-Chek was 200 he was given Zofran but still feeling nauseated    REVIEW OF SYSTEMS  Positives as above. Pertinent negatives include headache vision changes unilateral weakness numbness or tingling chest pain shortness of breath diarrhea constipation bloody stools bloody emesis dysuria hematuria rash fevers chills cough congestion  All other review of systems are negative    PAST MEDICAL HISTORY   has a past medical history of Anxiety, ASTHMA, Bipolar 1 disorder ([facility]), Depression, Diabetes ([facility]), Fall, Glaucoma, Glaucoma (1982), Hypothyroidism, Indigestion, Mental disorder, Murmur, Pneumonia, Psychiatric problem (2002), S/P thyroidectomy, Seizure ([facility]) (2010), Seizure disorder ([facility]), and Unspecified disorder of thyroid.    SOCIAL HISTORY  Social History     Tobacco Use   • Smoking status: Current Every Day Smoker     Packs/day: 0.25     Types: Cigarettes, Cigars     Last attempt to quit:  2020     Years since quittin.6   • Smokeless tobacco: Never Used   • Tobacco comment: 3 cigarettes a day   Substance and Sexual Activity   • Alcohol use: Not Currently     Frequency: 2-3 times a week   • Drug use: Yes     Types: Inhaled     Comment: marijuana   • Sexual activity: Not on file       SURGICAL HISTORY   has a past surgical history that includes eye surgery; thyroid lobectomy; and other.    CURRENT MEDICATIONS  Home Medications     Reviewed by Ravinder Moeller R.N. (Registered Nurse) on 21 at 2230  Med List Status: Partial   Medication Last Dose Status   acetaminophen (TYLENOL) 500 MG Tab  Active   aspirin EC (ECOTRIN) 81 MG Tablet Delayed Response  Active   atorvastatin (LIPITOR) 80 MG tablet  Active   budesonide-formoterol (SYMBICORT) 160-4.5 MCG/ACT Aerosol  Active   busPIRone (BUSPAR) 10 MG Tab tablet  Active   Cholecalciferol (VITAMIN D) 2000 UNIT Tab  Active   divalproex (DEPAKOTE) 500 MG Tablet Delayed Response  Active   divalproex (DEPAKOTE) 500 MG Tablet Delayed Response  Active   divalproex (DEPAKOTE) 500 MG Tablet Delayed Response  Active   Empagliflozin (JARDIANCE) 25 MG Tab  Active   famotidine (PEPCID) 10 MG tablet  Active   fenofibrate (ANTARA) 130 MG capsule  Active   glimepiride (AMARYL) 4 MG Tab  Active   ketorolac (TORADOL) 10 MG Tab  Active   LATUDA 20 MG Tab  Active   levothyroxine (SYNTHROID) 200 MCG Tab  Active   levothyroxine (SYNTHROID) 25 MCG Tab  Active   lidocaine (LIDODERM) 5 % Patch  Active   lisinopril (PRINIVIL) 10 MG Tab  Active   metformin (GLUCOPHAGE) 1000 MG tablet  Active   montelukast (SINGULAIR) 10 MG Tab  Active   omeprazole (PRILOSEC) 20 MG delayed-release capsule  Active   prazosin (MINIPRESS) 2 MG Cap  Active   sertraline (ZOLOFT) 100 MG Tab  Active   topiramate (TOPAMAX) 25 MG Tab  Active   traZODone (DESYREL) 100 MG Tab  Active                ALLERGIES  Allergies   Allergen Reactions   • Geodon [Ziprasidone Hcl] Anaphylaxis     Anaphylaxis per  "patient   • Abilify      \"Feeling tired, like I don't even know whats going on around me\"   • Fish      Pt reports fish causes him to be sick to his stomach         PHYSICAL EXAM  VITAL SIGNS: /68   Pulse 80   Temp 36.2 °C (97.2 °F) (Temporal)   Resp 18   Ht 1.981 m (6' 6\")   Wt (!) 135 kg (297 lb)   SpO2 95%   BMI 34.32 kg/m²    Pulse ox interpretation: I interpret this pulse ox as normal.  Constitutional: Alert in no apparent distress.  HENT: Normocephalic atraumatic, MMM  Eyes: PER, Conjunctiva normal, Non-icteric.   Neck: Normal range of motion, No tenderness, Supple, No stridor.   Cardiovascular: Regular rate and rhythm, no murmurs.   Thorax & Lungs: Normal breath sounds, No respiratory distress, No wheezing, No chest tenderness.   Abdomen: Bowel sounds normal, Soft, No tenderness, No pulsatile masses. No peritoneal signs.  Skin: Warm, Dry, No erythema, No rash.   Back: No bony tenderness, No CVA tenderness.   Extremities/MSK: Intact equal distal pulses, No edema, No tenderness, No cyanosis, no major deformities noted  Neurologic: Alert and oriented x3, No focal deficits noted.       DIFFERENTIAL DIAGNOSIS AND WORK UP PLAN    This is a 40 y.o. male who presents with lightheadedness and a syncopal event in the setting of Trulicity, this could be medication side effects that could be an early viral syndrome complaining abdominal cramping but his abdomen is soft and nontender his Accu-Chek was normal for EMS and will repeated here because the biggest risk of Trulicity is hypoglycemia but he is no longer taking a sulfonylurea even though he states the home he stays in did give him his Lantus last night.  Otherwise will observe him just to ensure he does not drop his blood glucose we will also do orthostatics and electrolytes and evaluate for anemia    DIAGNOSTIC STUDIES / PROCEDURES    EKG  Results for orders placed or performed during the hospital encounter of 01/28/21   EKG   Result Value Ref Range "    Report       [facility] Emergency Dept.    Test Date:  2021  Pt Name:    HOLLY KIM                 Department: ER  MRN:        [id number]                      Room:        38  Gender:     Male                         Technician: [id number]  :        1980                   Requested By:ER TRIAGE PROTOCOL  Order #:    [id number]                    Reading MD:    Measurements  Intervals                                Axis  Rate:       84                           P:          49  WA:         164                          QRS:        26  QRSD:       108                          T:          9  QT:         376  QTc:        445    Interpretive Statements  Sinus rhythm at a rate 84 no ST elevations or ST depression low voltage in the inferior leads but unchanged from prior no abnormal T wave inversions no pathognomonic Q waves normal intervals normal axis  Compared to ECG 01/15/2021 13:13:59  No significant changes         LABS  Pertinent Lab Findings  CBC with a white count of 13 hemoglobin 12.8 with normal platelet count otherwise no significant abnormalities of the CMP with glucose of 185 without evidence of DKA normal lipase        COURSE & MEDICAL DECISION MAKING  Pertinent Labs & Imaging studies reviewed. (See chart for details)    12:22 AM  I reassessed patient bedside after Toradol Bentyl and Reglan he states he is feeling better still is mild abdominal cramping but was sleeping comfortably is definitely improved.  His glucose on his labs are 185 his orthostatics were unremarkable otherwise resting comfortably.  I told him to call his primary care provider to discuss his symptoms today 1 day after his Trulicity shot to watch his sugars at home and return to the ED for any new or worsening issues be given a day off work for some rest and antiemetic and stricter precautions he understands feels comfortable going home    /61   Pulse 64   Temp 36.2 °C (97.2 °F) (Temporal)   Resp  "16   Ht 1.981 m (6' 6\")   Wt (!) 135 kg (297 lb)   SpO2 90%   BMI 34.32 kg/m²       I verified that the patient was wearing a mask and I was wearing appropriate PPE every time I entered the room. The patient's mask was on the patient at all times during my encounter except for a brief view of the oropharynx.      The patient will return for new or worsening symptoms and is stable at the time of discharge.    The patient is referred to a primary physician for blood pressure management, diabetic screening, and for all other preventative health concerns.    DISPOSITION:  Patient will be discharged home in stable condition.    FOLLOW UP:  ANIKET Davis  [address] [facility] [address] [phone]    Call on 1/29/2021      [facility], Emergency Dept  [address] [facility] [address] [phone]    If symptoms worsen      OUTPATIENT MEDICATIONS:  Discharge Medication List as of 1/29/2021  1:08 AM      START taking these medications    Details   ondansetron (ZOFRAN ODT) 4 MG TABLET DISPERSIBLE Take 1 Tab by mouth every 6 hours as needed., Disp-8 Tab, R-0, Normal               FINAL IMPRESSION  1. Lightheaded     2. Non-intractable vomiting with nausea, unspecified vomiting type     3. Medication side effect             Electronically signed by: Daniela Stephen M.D., 1/28/2021 10:37 PM    This dictation has been created using voice recognition software and/or scribes. The accuracy of the dictation is limited by the abilities of the software and the expertise of the scribes. I expect there may be some errors of grammar and possibly content. I made every attempt to manually correct the errors within my dictation. However, errors related to voice recognition software and/or scribes may still exist and should be interpreted within the appropriate context.    "

## 2021-01-29 NOTE — ED NOTES
"Pt discharged home. IV discontinued and gauze placed, pt in possession of belongings. Pt provided discharge education and information pertaining to medications and follow up appointments. Pt received copy of discharge instructions and verbalized understanding. /65   Pulse 70   Temp 36.2 °C (97.2 °F) (Temporal)   Resp 16   Ht 1.981 m (6' 6\")   Wt (!) 135 kg (297 lb)   SpO2 90%   BMI 34.32 kg/m²     "

## 2021-01-30 ENCOUNTER — APPOINTMENT (OUTPATIENT)
Dept: RADIOLOGY | Facility: MEDICAL CENTER | Age: 41
End: 2021-01-30
Attending: EMERGENCY MEDICINE
Payer: MEDICAID

## 2021-01-30 ENCOUNTER — HOSPITAL ENCOUNTER (EMERGENCY)
Facility: MEDICAL CENTER | Age: 41
End: 2021-01-30
Attending: EMERGENCY MEDICINE | Admitting: EMERGENCY MEDICINE
Payer: MEDICAID

## 2021-01-30 VITALS
HEART RATE: 66 BPM | OXYGEN SATURATION: 96 % | SYSTOLIC BLOOD PRESSURE: 109 MMHG | DIASTOLIC BLOOD PRESSURE: 58 MMHG | RESPIRATION RATE: 18 BRPM | BODY MASS INDEX: 34.36 KG/M2 | WEIGHT: 297 LBS | TEMPERATURE: 96.6 F | HEIGHT: 78 IN

## 2021-01-30 DIAGNOSIS — K59.00 CONSTIPATION, UNSPECIFIED CONSTIPATION TYPE: ICD-10-CM

## 2021-01-30 PROCEDURE — 74018 RADEX ABDOMEN 1 VIEW: CPT

## 2021-01-30 PROCEDURE — 99284 EMERGENCY DEPT VISIT MOD MDM: CPT

## 2021-01-30 PROCEDURE — 700101 HCHG RX REV CODE 250: Performed by: EMERGENCY MEDICINE

## 2021-01-30 RX ORDER — ENEMA 19; 7 G/133ML; G/133ML
1 ENEMA RECTAL ONCE
Status: DISCONTINUED | OUTPATIENT
Start: 2021-01-30 | End: 2021-01-30 | Stop reason: HOSPADM

## 2021-01-30 RX ORDER — POLYETHYLENE GLYCOL 3350 17 G/17G
1 POWDER, FOR SOLUTION ORAL ONCE
Status: COMPLETED | OUTPATIENT
Start: 2021-01-30 | End: 2021-01-30

## 2021-01-30 RX ORDER — POLYETHYLENE GLYCOL 3350 17 G/17G
17 POWDER, FOR SOLUTION ORAL DAILY
Qty: 850 G | Refills: 0 | Status: SHIPPED | OUTPATIENT
Start: 2021-01-30 | End: 2021-02-22 | Stop reason: SDUPTHER

## 2021-01-30 RX ADMIN — POLYETHYLENE GLYCOL 3350 1 PACKET: 17 POWDER, FOR SOLUTION ORAL at 16:03

## 2021-01-30 ASSESSMENT — PAIN DESCRIPTION - PAIN TYPE: TYPE: ACUTE PAIN

## 2021-01-30 ASSESSMENT — FIBROSIS 4 INDEX: FIB4 SCORE: 0.57

## 2021-01-30 NOTE — ED PROVIDER NOTES
ED Provider Note    CHIEF COMPLAINT  Chief Complaint   Patient presents with   • Constipation       HPI  Norm Prabhakar is a 40 y.o. male who presents to the emergency department with complaint of constipation.  He states he has not had a bowel movement in 4 days.  He states he has had severe abdominal discomfort that is dull in nature and colicky.  He denies medic easy, melena, hematemesis, fever, shakes, chills, sweats.  He was seen yesterday at work now [facility] for nausea.    REVIEW OF SYSTEMS  Positives as above. Pertinent negatives include fever, shakes, chills, sweats, vomiting, hematochezia, melena, back pain, and is, dizziness.  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   • Hypothyroidism    • Indigestion     once in a while   • Mental disorder     learning disabilities; speech impairment; developmental delays   • Murmur     since birth   • Pneumonia     remote   • Psychiatric problem 2002    PTSD   • S/P thyroidectomy    • Seizure ([facility]) 2010   • Seizure disorder ([facility])    • Unspecified disorder of thyroid        FAMILY HISTORY  Noncontributory    SOCIAL HISTORY  Social History     Socioeconomic History   • Marital status: Single     Spouse name: Not on file   • Number of children: Not on file   • Years of education: Not on file   • Highest education level: Not on file   Occupational History   • Not on file   Social Needs   • Financial resource strain: Somewhat hard   • Food insecurity     Worry: Sometimes true     Inability: Sometimes true   • Transportation needs     Medical: No     Non-medical: No   Tobacco Use   • Smoking status: Current Every Day Smoker     Packs/day: 0.25     Types: Cigarettes, Cigars      Last attempt to quit: 2020     Years since quittin.6   • Smokeless tobacco: Never Used   • Tobacco comment: 3 cigarettes a day   Substance and Sexual Activity   • Alcohol use: Not Currently     Frequency: 2-3 times a week   • Drug use: Yes     Types: Inhaled     Comment: marijuana   • Sexual activity: Not on file   Lifestyle   • Physical activity     Days per week: Not on file     Minutes per session: Not on file   • Stress: Not on file   Relationships   • Social connections     Talks on phone: Not on file     Gets together: Not on file     Attends Evangelical service: Not on file     Active member of club or organization: Not on file     Attends meetings of clubs or organizations: Not on file     Relationship status: Not on file   • Intimate partner violence     Fear of current or ex partner: Not on file     Emotionally abused: Not on file     Physically abused: Not on file     Forced sexual activity: Not on file   Other Topics Concern   • Not on file   Social History Narrative   • Not on file       SURGICAL HISTORY  Past Surgical History:   Procedure Laterality Date   • EYE SURGERY     • OTHER      Hernia Repair when he was 10 yrs old   • THYROID LOBECTOMY         CURRENT MEDICATIONS  Home Medications     Reviewed by Benjamin Mata (Pharmacy Tech) on 21 at 1603  Med List Status: Unable to Obtain   Medication Last Dose Status   acetaminophen (TYLENOL) 500 MG Tab  Active   aspirin EC (ECOTRIN) 81 MG Tablet Delayed Response  Active   atorvastatin (LIPITOR) 80 MG tablet  Active   budesonide-formoterol (SYMBICORT) 160-4.5 MCG/ACT Aerosol  Active   busPIRone (BUSPAR) 10 MG Tab tablet  Active   Cholecalciferol (VITAMIN D) 2000 UNIT Tab  Active   divalproex (DEPAKOTE) 500 MG Tablet Delayed Response  Active   divalproex (DEPAKOTE) 500 MG Tablet Delayed Response  Active   divalproex (DEPAKOTE) 500 MG Tablet Delayed Response  Active   Empagliflozin (JARDIANCE) 25 MG Tab  Active   famotidine (PEPCID) 10 MG  "tablet  Active   fenofibrate (ANTARA) 130 MG capsule  Active   glimepiride (AMARYL) 4 MG Tab  Active   ketorolac (TORADOL) 10 MG Tab  Active   LATUDA 20 MG Tab  Active   levothyroxine (SYNTHROID) 200 MCG Tab  Active   levothyroxine (SYNTHROID) 25 MCG Tab  Active   lidocaine (LIDODERM) 5 % Patch  Active   lisinopril (PRINIVIL) 10 MG Tab  Active   metformin (GLUCOPHAGE) 1000 MG tablet  Active   montelukast (SINGULAIR) 10 MG Tab  Active   omeprazole (PRILOSEC) 20 MG delayed-release capsule  Active   ondansetron (ZOFRAN ODT) 4 MG TABLET DISPERSIBLE  Active   prazosin (MINIPRESS) 2 MG Cap  Active   sertraline (ZOLOFT) 100 MG Tab  Active   topiramate (TOPAMAX) 25 MG Tab  Active   traZODone (DESYREL) 100 MG Tab  Active                ALLERGIES  Allergies   Allergen Reactions   • Geodon [Ziprasidone Hcl] Anaphylaxis     Anaphylaxis per patient   • Abilify      \"Feeling tired, like I don't even know whats going on around me\"   • Fish      Pt reports fish causes him to be sick to his stomach         PHYSICAL EXAM  VITAL SIGNS: /60   Pulse 76   Temp 35.9 °C (96.6 °F) (Temporal)   Resp 18   Ht 1.981 m (6' 6\")   Wt (!) 135 kg (297 lb)   SpO2 92%   BMI 34.32 kg/m²    Constitutional: Well developed, Well nourished, No acute distress, Non-toxic appearance.   Eyes: PERRLA, EOMI, Conjunctiva normal, No discharge.   Cardiovascular: Normal heart rate, Normal rhythm, No murmurs, No rubs, No gallops, and intact distal pulses.   Thorax & Lungs:  No respiratory distress, no rales, no rhonchi, No wheezing, No chest wall tenderness.   Abdomen: Bowel sounds normal, Soft, No tenderness, No guarding, No rebound, No pulsatile masses.   Skin: Warm, Dry, No erythema, No rash.   Rectal: No perirectal abnormality, hemorrhoid, normal rectal tone, brown stool  Extremities: Full range of motion, no deformity, no edema.  Neurologic: Alert & oriented x 3, No focal deficits noted, acting appropriately on exam.  Psychiatric: Affect normal for " clinical presentation.      RADIOLOGY/PROCEDURES  OW-FERSHIU-0 VIEW   Final Result      1.  Increased colonic stool suggesting constipation.   2.  No evidence of bowel obstruction.      Procedure: Fecal disimpaction  The patient was consented verbally for fecal disimpaction including bowel rupture, bleeding, hemorrhoid.  Patient is placed in the left lateral recumbent position, his knees were drawn up, using copious amount of K-Y jelly, I inserted my finger and disimpacted a large amount of hard stool from the rectal vault.  The patient tolerated the procedure well.  COURSE & MEDICAL DECISION MAKING  Pertinent Labs & Imaging studies reviewed. (See chart for details)  This is a pleasant 5-year-old gentleman presents with constipation.  Is confirmed by x-ray.  I do not believe the patient has a small bowel obstruction requiring surgical intervention or further evaluation with CT scan.  Patient received MiraLAX followed by a fecal disimpaction that was completed without difficulty and the patient received a fleets enema.  Post enema, the patient had a successful bowel movement.  He felt much better, soft, nontender, nonsurgical abdomen.  I have prescribed him MiraLAX instructed him on constipation.      FINAL IMPRESSION     1. Constipation, unspecified constipation type          DISPOSITION:  Patient will be discharged home in stable condition.    FOLLOW UP:  [facility], Emergency Dept  [address]  [facility] [id number] [phone]    If symptoms worsen    Krystin Gupta A.P.R.[facility]yric  [address] [facility] [address] [phone]    Schedule an appointment as soon as possible for a visit         OUTPATIENT MEDICATIONS:  New Prescriptions    POLYETHYLENE GLYCOL 3350 (MIRALAX) 17 GM/SCOOP POWDER    Take 17 g by mouth every day.       Electronically signed by: Ramos López D.O., 1/30/2021 3:10 PM

## 2021-01-30 NOTE — ED TRIAGE NOTES
Pt presents via [facility] for constipation for 4 days and generalized abd pain. 7/10 currently. No meds taken. Pt A&Ox4 and ambulatory. Pt residing at [facility] care assisted living.     Patient masked. No respiratory symptoms, no recent travel, denies known COVID exposure.

## 2021-01-31 NOTE — ED NOTES
Unable to complete med rec at this time   Pt states that he only knows some of his medications, and not the doses   Pt states that he took his AM and Afternoon meds today   Pharmacy is closed till Monday so I am unable to verify pt's meds

## 2021-01-31 NOTE — DISCHARGE PLANNING
Anticipated Discharge Disposition: Discharge home from ER    Action: Spoke with pt regarding ride home, pt states his mother is out of town at this time.  Verified MTM status on medicaid portal. Call placed to [facility] for transportation set up.  Per Mar[employer] will  pt in about 20 minutes.    Barriers to Discharge: transportation    Plan: Discharge home after transportation set up

## 2021-02-03 ENCOUNTER — APPOINTMENT (OUTPATIENT)
Dept: RADIOLOGY | Facility: MEDICAL CENTER | Age: 41
End: 2021-02-03
Attending: EMERGENCY MEDICINE
Payer: MEDICAID

## 2021-02-03 ENCOUNTER — HOSPITAL ENCOUNTER (OUTPATIENT)
Facility: MEDICAL CENTER | Age: 41
End: 2021-02-08
Attending: EMERGENCY MEDICINE | Admitting: INTERNAL MEDICINE
Payer: MEDICAID

## 2021-02-03 DIAGNOSIS — F44.9 CONVERSION DISORDER: ICD-10-CM

## 2021-02-03 DIAGNOSIS — R29.898 WEAKNESS OF RIGHT LOWER EXTREMITY: ICD-10-CM

## 2021-02-03 DIAGNOSIS — R29.898 WEAKNESS OF RIGHT UPPER EXTREMITY: ICD-10-CM

## 2021-02-03 LAB
ABO GROUP BLD: NORMAL
ALBUMIN SERPL BCP-MCNC: 4.3 G/DL (ref 3.2–4.9)
ALBUMIN/GLOB SERPL: 1.7 G/DL
ALP SERPL-CCNC: 49 U/L (ref 30–99)
ALT SERPL-CCNC: 11 U/L (ref 2–50)
AMPHET UR QL SCN: NEGATIVE
ANION GAP SERPL CALC-SCNC: 13 MMOL/L (ref 7–16)
APPEARANCE UR: CLEAR
APTT PPP: 29.5 SEC (ref 24.7–36)
AST SERPL-CCNC: 12 U/L (ref 12–45)
BARBITURATES UR QL SCN: NEGATIVE
BASOPHILS # BLD AUTO: 0.6 % (ref 0–1.8)
BASOPHILS # BLD: 0.05 K/UL (ref 0–0.12)
BENZODIAZ UR QL SCN: NEGATIVE
BILIRUB SERPL-MCNC: 0.3 MG/DL (ref 0.1–1.5)
BILIRUB UR QL STRIP.AUTO: NEGATIVE
BLD GP AB SCN SERPL QL: NORMAL
BUN SERPL-MCNC: 26 MG/DL (ref 8–22)
BZE UR QL SCN: NEGATIVE
CALCIUM SERPL-MCNC: 9.3 MG/DL (ref 8.5–10.5)
CANNABINOIDS UR QL SCN: NEGATIVE
CHLORIDE SERPL-SCNC: 102 MMOL/L (ref 96–112)
CO2 SERPL-SCNC: 22 MMOL/L (ref 20–33)
COLOR UR: YELLOW
CREAT SERPL-MCNC: 1.12 MG/DL (ref 0.5–1.4)
EKG IMPRESSION: NORMAL
EOSINOPHIL # BLD AUTO: 0.11 K/UL (ref 0–0.51)
EOSINOPHIL NFR BLD: 1.4 % (ref 0–6.9)
ERYTHROCYTE [DISTWIDTH] IN BLOOD BY AUTOMATED COUNT: 46.8 FL (ref 35.9–50)
EST. AVERAGE GLUCOSE BLD GHB EST-MCNC: 212 MG/DL
ETHANOL BLD-MCNC: <10.1 MG/DL (ref 0–10)
FOLATE SERPL-MCNC: 7.1 NG/ML
GLOBULIN SER CALC-MCNC: 2.5 G/DL (ref 1.9–3.5)
GLUCOSE BLD-MCNC: 150 MG/DL (ref 65–99)
GLUCOSE BLD-MCNC: 67 MG/DL (ref 65–99)
GLUCOSE BLD-MCNC: 71 MG/DL (ref 65–99)
GLUCOSE SERPL-MCNC: 100 MG/DL (ref 65–99)
GLUCOSE UR STRIP.AUTO-MCNC: >=1000 MG/DL
HBA1C MFR BLD: 9 % (ref 0–5.6)
HCT VFR BLD AUTO: 42.3 % (ref 42–52)
HGB BLD-MCNC: 13.3 G/DL (ref 14–18)
IMM GRANULOCYTES # BLD AUTO: 0.11 K/UL (ref 0–0.11)
IMM GRANULOCYTES NFR BLD AUTO: 1.4 % (ref 0–0.9)
INR PPP: 0.96 (ref 0.87–1.13)
KETONES UR STRIP.AUTO-MCNC: ABNORMAL MG/DL
LEUKOCYTE ESTERASE UR QL STRIP.AUTO: NEGATIVE
LYMPHOCYTES # BLD AUTO: 2.97 K/UL (ref 1–4.8)
LYMPHOCYTES NFR BLD: 36.6 % (ref 22–41)
MCH RBC QN AUTO: 28.4 PG (ref 27–33)
MCHC RBC AUTO-ENTMCNC: 31.4 G/DL (ref 33.7–35.3)
MCV RBC AUTO: 90.2 FL (ref 81.4–97.8)
METHADONE UR QL SCN: NEGATIVE
MICRO URNS: ABNORMAL
MONOCYTES # BLD AUTO: 0.59 K/UL (ref 0–0.85)
MONOCYTES NFR BLD AUTO: 7.3 % (ref 0–13.4)
NEUTROPHILS # BLD AUTO: 4.28 K/UL (ref 1.82–7.42)
NEUTROPHILS NFR BLD: 52.7 % (ref 44–72)
NITRITE UR QL STRIP.AUTO: NEGATIVE
NRBC # BLD AUTO: 0 K/UL
NRBC BLD-RTO: 0 /100 WBC
OPIATES UR QL SCN: NEGATIVE
OXYCODONE UR QL SCN: NEGATIVE
PCP UR QL SCN: NEGATIVE
PH UR STRIP.AUTO: 5 [PH] (ref 5–8)
PLATELET # BLD AUTO: 274 K/UL (ref 164–446)
PMV BLD AUTO: 10.5 FL (ref 9–12.9)
POTASSIUM SERPL-SCNC: 3.8 MMOL/L (ref 3.6–5.5)
PROPOXYPH UR QL SCN: NEGATIVE
PROT SERPL-MCNC: 6.8 G/DL (ref 6–8.2)
PROT UR QL STRIP: NEGATIVE MG/DL
PROTHROMBIN TIME: 13.1 SEC (ref 12–14.6)
RBC # BLD AUTO: 4.69 M/UL (ref 4.7–6.1)
RBC UR QL AUTO: NEGATIVE
RH BLD: NORMAL
SARS-COV-2 RNA RESP QL NAA+PROBE: NOTDETECTED
SODIUM SERPL-SCNC: 137 MMOL/L (ref 135–145)
SP GR UR REFRACTOMETRY: 1.04
SPECIMEN SOURCE: NORMAL
TREPONEMA PALLIDUM IGG+IGM AB [PRESENCE] IN SERUM OR PLASMA BY IMMUNOASSAY: NORMAL
TROPONIN T SERPL-MCNC: 22 NG/L (ref 6–19)
TSH SERPL DL<=0.005 MIU/L-ACNC: 48.6 UIU/ML (ref 0.38–5.33)
UROBILINOGEN UR STRIP.AUTO-MCNC: 1 MG/DL
VALPROATE SERPL-MCNC: 66.1 UG/ML (ref 50–100)
VIT B12 SERPL-MCNC: 367 PG/ML (ref 211–911)
WBC # BLD AUTO: 8.1 K/UL (ref 4.8–10.8)

## 2021-02-03 PROCEDURE — 700101 HCHG RX REV CODE 250: Performed by: INTERNAL MEDICINE

## 2021-02-03 PROCEDURE — 700111 HCHG RX REV CODE 636 W/ 250 OVERRIDE (IP): Performed by: INTERNAL MEDICINE

## 2021-02-03 PROCEDURE — 93005 ELECTROCARDIOGRAM TRACING: CPT | Performed by: EMERGENCY MEDICINE

## 2021-02-03 PROCEDURE — 86780 TREPONEMA PALLIDUM: CPT

## 2021-02-03 PROCEDURE — 99220 PR INITIAL OBSERVATION CARE,LEVL III: CPT | Performed by: INTERNAL MEDICINE

## 2021-02-03 PROCEDURE — 86901 BLOOD TYPING SEROLOGIC RH(D): CPT

## 2021-02-03 PROCEDURE — 99285 EMERGENCY DEPT VISIT HI MDM: CPT

## 2021-02-03 PROCEDURE — G0378 HOSPITAL OBSERVATION PER HR: HCPCS

## 2021-02-03 PROCEDURE — 70498 CT ANGIOGRAPHY NECK: CPT

## 2021-02-03 PROCEDURE — 86850 RBC ANTIBODY SCREEN: CPT

## 2021-02-03 PROCEDURE — 96372 THER/PROPH/DIAG INJ SC/IM: CPT | Mod: XU

## 2021-02-03 PROCEDURE — A9270 NON-COVERED ITEM OR SERVICE: HCPCS | Performed by: INTERNAL MEDICINE

## 2021-02-03 PROCEDURE — 82077 ASSAY SPEC XCP UR&BREATH IA: CPT

## 2021-02-03 PROCEDURE — 700102 HCHG RX REV CODE 250 W/ 637 OVERRIDE(OP): Performed by: INTERNAL MEDICINE

## 2021-02-03 PROCEDURE — 80164 ASSAY DIPROPYLACETIC ACD TOT: CPT

## 2021-02-03 PROCEDURE — 86900 BLOOD TYPING SEROLOGIC ABO: CPT

## 2021-02-03 PROCEDURE — 85610 PROTHROMBIN TIME: CPT

## 2021-02-03 PROCEDURE — 83036 HEMOGLOBIN GLYCOSYLATED A1C: CPT

## 2021-02-03 PROCEDURE — 81003 URINALYSIS AUTO W/O SCOPE: CPT | Mod: XU

## 2021-02-03 PROCEDURE — 84484 ASSAY OF TROPONIN QUANT: CPT

## 2021-02-03 PROCEDURE — 70496 CT ANGIOGRAPHY HEAD: CPT

## 2021-02-03 PROCEDURE — 71045 X-RAY EXAM CHEST 1 VIEW: CPT

## 2021-02-03 PROCEDURE — 700117 HCHG RX CONTRAST REV CODE 255: Performed by: EMERGENCY MEDICINE

## 2021-02-03 PROCEDURE — 99245 OFF/OP CONSLTJ NEW/EST HI 55: CPT | Performed by: PSYCHIATRY & NEUROLOGY

## 2021-02-03 PROCEDURE — 85730 THROMBOPLASTIN TIME PARTIAL: CPT

## 2021-02-03 PROCEDURE — U0003 INFECTIOUS AGENT DETECTION BY NUCLEIC ACID (DNA OR RNA); SEVERE ACUTE RESPIRATORY SYNDROME CORONAVIRUS 2 (SARS-COV-2) (CORONAVIRUS DISEASE [COVID-19]), AMPLIFIED PROBE TECHNIQUE, MAKING USE OF HIGH THROUGHPUT TECHNOLOGIES AS DESCRIBED BY CMS-2020-01-R: HCPCS

## 2021-02-03 PROCEDURE — 70450 CT HEAD/BRAIN W/O DYE: CPT

## 2021-02-03 PROCEDURE — 82746 ASSAY OF FOLIC ACID SERUM: CPT

## 2021-02-03 PROCEDURE — 84443 ASSAY THYROID STIM HORMONE: CPT

## 2021-02-03 PROCEDURE — 80307 DRUG TEST PRSMV CHEM ANLYZR: CPT

## 2021-02-03 PROCEDURE — 85025 COMPLETE CBC W/AUTO DIFF WBC: CPT

## 2021-02-03 PROCEDURE — 82607 VITAMIN B-12: CPT

## 2021-02-03 PROCEDURE — U0005 INFEC AGEN DETEC AMPLI PROBE: HCPCS

## 2021-02-03 PROCEDURE — 95816 EEG AWAKE AND DROWSY: CPT | Performed by: STUDENT IN AN ORGANIZED HEALTH CARE EDUCATION/TRAINING PROGRAM

## 2021-02-03 PROCEDURE — 95816 EEG AWAKE AND DROWSY: CPT | Mod: 26 | Performed by: STUDENT IN AN ORGANIZED HEALTH CARE EDUCATION/TRAINING PROGRAM

## 2021-02-03 PROCEDURE — 80053 COMPREHEN METABOLIC PANEL: CPT

## 2021-02-03 PROCEDURE — 82962 GLUCOSE BLOOD TEST: CPT | Mod: 91

## 2021-02-03 PROCEDURE — 36415 COLL VENOUS BLD VENIPUNCTURE: CPT

## 2021-02-03 RX ORDER — PROMETHAZINE HYDROCHLORIDE 25 MG/1
12.5-25 SUPPOSITORY RECTAL EVERY 4 HOURS PRN
Status: DISCONTINUED | OUTPATIENT
Start: 2021-02-03 | End: 2021-02-08 | Stop reason: HOSPADM

## 2021-02-03 RX ORDER — FENOFIBRATE 134 MG/1
130 CAPSULE ORAL EVERY EVENING
Status: DISCONTINUED | OUTPATIENT
Start: 2021-02-03 | End: 2021-02-04

## 2021-02-03 RX ORDER — TRAZODONE HYDROCHLORIDE 100 MG/1
100 TABLET ORAL NIGHTLY PRN
Status: DISCONTINUED | OUTPATIENT
Start: 2021-02-03 | End: 2021-02-08 | Stop reason: HOSPADM

## 2021-02-03 RX ORDER — BISACODYL 10 MG
10 SUPPOSITORY, RECTAL RECTAL
Status: DISCONTINUED | OUTPATIENT
Start: 2021-02-03 | End: 2021-02-08 | Stop reason: HOSPADM

## 2021-02-03 RX ORDER — BUSPIRONE HYDROCHLORIDE 10 MG/1
10 TABLET ORAL 3 TIMES DAILY
Status: ON HOLD | COMMUNITY
End: 2021-03-15

## 2021-02-03 RX ORDER — TOPIRAMATE 25 MG/1
25 TABLET ORAL 2 TIMES DAILY
Status: DISCONTINUED | OUTPATIENT
Start: 2021-02-03 | End: 2021-02-03

## 2021-02-03 RX ORDER — DIVALPROEX SODIUM 500 MG/1
1000 TABLET, DELAYED RELEASE ORAL EVERY MORNING
Status: DISCONTINUED | OUTPATIENT
Start: 2021-02-03 | End: 2021-02-03

## 2021-02-03 RX ORDER — MONTELUKAST SODIUM 10 MG/1
10 TABLET ORAL EVERY EVENING
Status: ON HOLD | COMMUNITY
End: 2022-03-20 | Stop reason: SDUPTHER

## 2021-02-03 RX ORDER — ACETAMINOPHEN 500 MG
1000 TABLET ORAL 2 TIMES DAILY PRN
COMMUNITY
End: 2021-02-27 | Stop reason: SDUPTHER

## 2021-02-03 RX ORDER — LURASIDONE HYDROCHLORIDE 20 MG/1
20 TABLET, FILM COATED ORAL EVERY EVENING
Status: ON HOLD | COMMUNITY
End: 2022-03-20 | Stop reason: SDUPTHER

## 2021-02-03 RX ORDER — LEVOTHYROXINE SODIUM 0.03 MG/1
25 TABLET ORAL
Status: DISCONTINUED | OUTPATIENT
Start: 2021-02-03 | End: 2021-02-03

## 2021-02-03 RX ORDER — BUSPIRONE HYDROCHLORIDE 10 MG/1
10 TABLET ORAL 3 TIMES DAILY
Status: DISCONTINUED | OUTPATIENT
Start: 2021-02-03 | End: 2021-02-08 | Stop reason: HOSPADM

## 2021-02-03 RX ORDER — PROCHLORPERAZINE EDISYLATE 5 MG/ML
5-10 INJECTION INTRAMUSCULAR; INTRAVENOUS EVERY 4 HOURS PRN
Status: DISCONTINUED | OUTPATIENT
Start: 2021-02-03 | End: 2021-02-08 | Stop reason: HOSPADM

## 2021-02-03 RX ORDER — IBUPROFEN 600 MG/1
600 TABLET ORAL
COMMUNITY
End: 2021-02-27

## 2021-02-03 RX ORDER — POLYETHYLENE GLYCOL 3350 17 G/17G
1 POWDER, FOR SOLUTION ORAL
Status: DISCONTINUED | OUTPATIENT
Start: 2021-02-03 | End: 2021-02-08 | Stop reason: HOSPADM

## 2021-02-03 RX ORDER — BUDESONIDE AND FORMOTEROL FUMARATE DIHYDRATE 160; 4.5 UG/1; UG/1
2 AEROSOL RESPIRATORY (INHALATION) 2 TIMES DAILY
Status: ON HOLD | COMMUNITY
End: 2022-03-20 | Stop reason: SDUPTHER

## 2021-02-03 RX ORDER — ATORVASTATIN CALCIUM 80 MG/1
80 TABLET, FILM COATED ORAL EVERY EVENING
Status: DISCONTINUED | OUTPATIENT
Start: 2021-02-03 | End: 2021-02-08 | Stop reason: HOSPADM

## 2021-02-03 RX ORDER — EMPAGLIFLOZIN 25 MG/1
25 TABLET, FILM COATED ORAL DAILY
Status: ON HOLD | COMMUNITY
End: 2022-03-20 | Stop reason: SDUPTHER

## 2021-02-03 RX ORDER — SERTRALINE HYDROCHLORIDE 100 MG/1
100 TABLET, FILM COATED ORAL DAILY
Status: DISCONTINUED | OUTPATIENT
Start: 2021-02-04 | End: 2021-02-04

## 2021-02-03 RX ORDER — TOPIRAMATE 25 MG/1
25 TABLET ORAL DAILY
COMMUNITY
End: 2021-03-09

## 2021-02-03 RX ORDER — PROMETHAZINE HYDROCHLORIDE 25 MG/1
12.5-25 TABLET ORAL EVERY 4 HOURS PRN
Status: DISCONTINUED | OUTPATIENT
Start: 2021-02-03 | End: 2021-02-08 | Stop reason: HOSPADM

## 2021-02-03 RX ORDER — INSULIN GLARGINE 100 [IU]/ML
30 INJECTION, SOLUTION SUBCUTANEOUS 2 TIMES DAILY
Status: ON HOLD | COMMUNITY
End: 2023-03-10 | Stop reason: SDUPTHER

## 2021-02-03 RX ORDER — DIVALPROEX SODIUM 500 MG/1
1500 TABLET, DELAYED RELEASE ORAL EVERY EVENING
Status: DISCONTINUED | OUTPATIENT
Start: 2021-02-03 | End: 2021-02-03

## 2021-02-03 RX ORDER — ATORVASTATIN CALCIUM 20 MG/1
20 TABLET, FILM COATED ORAL EVERY EVENING
Status: ON HOLD | COMMUNITY
End: 2022-03-20 | Stop reason: SDUPTHER

## 2021-02-03 RX ORDER — BUDESONIDE AND FORMOTEROL FUMARATE DIHYDRATE 160; 4.5 UG/1; UG/1
2 AEROSOL RESPIRATORY (INHALATION) 2 TIMES DAILY
Status: DISCONTINUED | OUTPATIENT
Start: 2021-02-03 | End: 2021-02-08 | Stop reason: HOSPADM

## 2021-02-03 RX ORDER — CHLORAL HYDRATE 500 MG
1000 CAPSULE ORAL 2 TIMES DAILY
Status: ON HOLD | COMMUNITY
End: 2023-06-11 | Stop reason: SDUPTHER

## 2021-02-03 RX ORDER — AMOXICILLIN 250 MG
2 CAPSULE ORAL 2 TIMES DAILY
Status: DISCONTINUED | OUTPATIENT
Start: 2021-02-03 | End: 2021-02-08 | Stop reason: HOSPADM

## 2021-02-03 RX ORDER — ONDANSETRON 2 MG/ML
4 INJECTION INTRAMUSCULAR; INTRAVENOUS EVERY 4 HOURS PRN
Status: DISCONTINUED | OUTPATIENT
Start: 2021-02-03 | End: 2021-02-08 | Stop reason: HOSPADM

## 2021-02-03 RX ORDER — DEXTROSE MONOHYDRATE 25 G/50ML
50 INJECTION, SOLUTION INTRAVENOUS
Status: DISCONTINUED | OUTPATIENT
Start: 2021-02-03 | End: 2021-02-04

## 2021-02-03 RX ORDER — ACETAMINOPHEN 325 MG/1
650 TABLET ORAL EVERY 6 HOURS PRN
Status: DISCONTINUED | OUTPATIENT
Start: 2021-02-03 | End: 2021-02-08 | Stop reason: HOSPADM

## 2021-02-03 RX ORDER — LEVOTHYROXINE SODIUM 0.03 MG/1
200 TABLET ORAL
Status: ON HOLD | COMMUNITY
End: 2022-03-20 | Stop reason: SDUPTHER

## 2021-02-03 RX ORDER — LISINOPRIL 10 MG/1
10 TABLET ORAL DAILY
Status: ON HOLD | COMMUNITY
End: 2022-03-20 | Stop reason: SDUPTHER

## 2021-02-03 RX ORDER — PRAZOSIN HYDROCHLORIDE 2 MG/1
4 CAPSULE ORAL EVERY EVENING
Status: ON HOLD | COMMUNITY
End: 2022-03-20 | Stop reason: SDUPTHER

## 2021-02-03 RX ORDER — OMEPRAZOLE 20 MG/1
20 CAPSULE, DELAYED RELEASE ORAL 2 TIMES DAILY
Status: DISCONTINUED | OUTPATIENT
Start: 2021-02-04 | End: 2021-02-03

## 2021-02-03 RX ORDER — FAMOTIDINE 20 MG/1
10 TABLET, FILM COATED ORAL 2 TIMES DAILY
Status: DISCONTINUED | OUTPATIENT
Start: 2021-02-03 | End: 2021-02-08 | Stop reason: HOSPADM

## 2021-02-03 RX ORDER — FENOFIBRATE 134 MG/1
134 CAPSULE ORAL EVERY EVENING
COMMUNITY
End: 2021-02-27

## 2021-02-03 RX ORDER — SERTRALINE HYDROCHLORIDE 100 MG/1
150 TABLET, FILM COATED ORAL
Status: ON HOLD | COMMUNITY
End: 2022-03-20 | Stop reason: SDUPTHER

## 2021-02-03 RX ORDER — ONDANSETRON 4 MG/1
4 TABLET, ORALLY DISINTEGRATING ORAL EVERY 4 HOURS PRN
Status: DISCONTINUED | OUTPATIENT
Start: 2021-02-03 | End: 2021-02-08 | Stop reason: HOSPADM

## 2021-02-03 RX ORDER — OMEPRAZOLE 20 MG/1
20 CAPSULE, DELAYED RELEASE ORAL 2 TIMES DAILY
Status: DISCONTINUED | OUTPATIENT
Start: 2021-02-03 | End: 2021-02-06

## 2021-02-03 RX ORDER — DIVALPROEX SODIUM 500 MG/1
500-1500 TABLET, DELAYED RELEASE ORAL
Status: ON HOLD | COMMUNITY
End: 2021-03-15

## 2021-02-03 RX ORDER — LORAZEPAM 2 MG/ML
4 INJECTION INTRAMUSCULAR
Status: DISCONTINUED | OUTPATIENT
Start: 2021-02-03 | End: 2021-02-08 | Stop reason: HOSPADM

## 2021-02-03 RX ORDER — ACETAMINOPHEN 160 MG
4000 TABLET,DISINTEGRATING ORAL EVERY EVENING
Status: ON HOLD | COMMUNITY
End: 2023-06-11 | Stop reason: SDUPTHER

## 2021-02-03 RX ADMIN — DEXTROSE MONOHYDRATE 50 ML: 25 INJECTION, SOLUTION INTRAVENOUS at 22:59

## 2021-02-03 RX ADMIN — ENOXAPARIN SODIUM 40 MG: 40 INJECTION SUBCUTANEOUS at 18:00

## 2021-02-03 RX ADMIN — IOHEXOL 80 ML: 350 INJECTION, SOLUTION INTRAVENOUS at 12:52

## 2021-02-03 ASSESSMENT — ENCOUNTER SYMPTOMS
HEADACHES: 0
SHORTNESS OF BREATH: 0
FOCAL WEAKNESS: 1
EYE DISCHARGE: 0
DIARRHEA: 0
FEVER: 0
SPUTUM PRODUCTION: 0
DIZZINESS: 0
NAUSEA: 0
PALPITATIONS: 0
DEPRESSION: 0
NECK PAIN: 0
HEARTBURN: 0
SEIZURES: 0
WEIGHT LOSS: 0
EYE REDNESS: 0
NERVOUS/ANXIOUS: 0
ORTHOPNEA: 0
INSOMNIA: 0
VOMITING: 0
COUGH: 0
STRIDOR: 0
SENSORY CHANGE: 1
ABDOMINAL PAIN: 0
MYALGIAS: 0
BACK PAIN: 0
EYE PAIN: 0
BLURRED VISION: 0
CHILLS: 0

## 2021-02-03 ASSESSMENT — FIBROSIS 4 INDEX
FIB4 SCORE: 0.57
FIB4 SCORE: 0.5

## 2021-02-03 NOTE — H&P
Hospital Medicine History & Physical Note    Date of Service  2/3/2021    Primary Care Physician  ANIKET Davis    Consultants  neuro    Code Status  Full Code    Chief Complaint  Chief Complaint   Patient presents with   • Weakness       History of Presenting Illness  40 y.o. male with past medical history of seizure disorder, psychiatric disorder, essential hypertension, diabetes mellitus who presented 2/3/2021 with right-sided weakness started around 1130 this morning.  Associated with tingling numbness sensation.  The patient has similar hospitalization in the past.  In the ER he has CT scan of the head was done and showed no acute finding.  Neurology was consulted and recommended the patient is not a TPA candidate and suspect that his symptom is more likely related to seizure.  And recommended EEG and MRI study.    Review of Systems  Review of Systems   Constitutional: Negative for chills, fever and weight loss.   HENT: Negative for congestion and nosebleeds.    Eyes: Negative for blurred vision, pain, discharge and redness.   Respiratory: Negative for cough, sputum production, shortness of breath and stridor.    Cardiovascular: Negative for chest pain, palpitations and orthopnea.   Gastrointestinal: Negative for abdominal pain, diarrhea, heartburn, nausea and vomiting.   Genitourinary: Negative for dysuria, frequency and urgency.   Musculoskeletal: Negative for back pain, myalgias and neck pain.   Skin: Negative for itching and rash.   Neurological: Positive for sensory change and focal weakness. Negative for dizziness, seizures and headaches.   Psychiatric/Behavioral: Negative for depression. The patient is not nervous/anxious and does not have insomnia.        Past Medical History   has a past medical history of Anxiety, ASTHMA, Bipolar 1 disorder (HCC), Depression, Diabetes (HCC), Fall, Glaucoma, Glaucoma (1982), Hypothyroidism, Indigestion, Mental disorder, Murmur, Pneumonia, Psychiatric problem  "(2002), S/P thyroidectomy, Seizure (HCC) (2010), Seizure disorder (HCC), and Unspecified disorder of thyroid.    Surgical History   has a past surgical history that includes eye surgery; thyroid lobectomy; and other.     Family History  family history includes Heart Disease in his mother; Hypertension in his mother; Lung Disease in his mother; Stroke in his maternal grandmother.     Social History   reports that he has been smoking cigarettes and cigars. He has been smoking about 0.25 packs per day. He has never used smokeless tobacco. He reports previous alcohol use. He reports current drug use. Drug: Inhaled.    Allergies  Allergies   Allergen Reactions   • Geodon [Ziprasidone Hcl] Anaphylaxis     Anaphylaxis per patient   • Abilify      \"Feeling tired, like I don't even know whats going on around me\"   • Fish      Pt reports fish causes him to be sick to his stomach         Medications  Prior to Admission Medications   Prescriptions Last Dose Informant Patient Reported? Taking?   Cholecalciferol (VITAMIN D) 2000 UNIT Tab  MAR from Other Facility Yes No   Sig: Take 4,000 Units by mouth every bedtime. 2 tablets = 4000 units   Empagliflozin (JARDIANCE) 25 MG Tab  MAR from Other Facility Yes No   Sig: Take 25 mg by mouth every day.   LATUDA 20 MG Tab  MAR from Other Facility Yes No   Sig: Take 20 mg by mouth every bedtime.   acetaminophen (TYLENOL) 500 MG Tab  MAR from Other Facility Yes No   Sig: Take 1,000 mg by mouth every 6 hours as needed for Mild Pain.   aspirin EC (ECOTRIN) 81 MG Tablet Delayed Response  MAR from Other Facility Yes No   Sig: Take 81 mg by mouth every morning.   atorvastatin (LIPITOR) 80 MG tablet  MAR from Other Facility Yes No   Sig: Take 80 mg by mouth every evening.   budesonide-formoterol (SYMBICORT) 160-4.5 MCG/ACT Aerosol  MAR from Other Facility Yes No   Sig: Inhale 2 Puffs by mouth 2 Times a Day.   busPIRone (BUSPAR) 10 MG Tab tablet  MAR from Other Facility Yes No   Sig: Take 10 mg by " mouth 3 times a day.   divalproex (DEPAKOTE) 500 MG Tablet Delayed Response  MAR from Other Facility No No   Sig: Take 2 Tabs by mouth every morning.   Patient not taking: Reported on 10/16/2020   divalproex (DEPAKOTE) 500 MG Tablet Delayed Response  MAR from Other Facility No No   Sig: Take 3 Tabs by mouth every evening.   Patient not taking: Reported on 10/16/2020   divalproex (DEPAKOTE) 500 MG Tablet Delayed Response  MAR from Other Facility Yes No   Sig: Take 500-1,500 mg by mouth 2 Times a Day. Take 1 tab qam and 3 tabs qhs   famotidine (PEPCID) 10 MG tablet  MAR from Other Facility Yes No   Sig: Take 10 mg by mouth 2 times a day.   fenofibrate (ANTARA) 130 MG capsule  MAR from Other Facility Yes No   Sig: Take 130 mg by mouth every evening.   glimepiride (AMARYL) 4 MG Tab  MAR from Other Facility Yes No   Sig: Take 4 mg by mouth every morning.   ketorolac (TORADOL) 10 MG Tab   No No   Sig: Take 1 Tab by mouth every four hours as needed for Moderate Pain or Severe Pain.   levothyroxine (SYNTHROID) 200 MCG Tab  MAR from Other Facility Yes No   Sig: Take 200 mcg by mouth Every morning on an empty stomach. Pt takes 200 mcg and 25 mcg for a total dose of 225 mcg every morning   levothyroxine (SYNTHROID) 25 MCG Tab  MAR from Other Facility Yes No   Sig: Take 25 mcg by mouth Every morning on an empty stomach. Pt takes 200 mcg and 25 mcg for a total dose of 225 mcg every morning   lidocaine (LIDODERM) 5 % Patch   No No   Sig: Place 1 Patch on the skin every 24 hours.   lisinopril (PRINIVIL) 10 MG Tab  MAR from Other Facility Yes No   Sig: Take 10 mg by mouth every day.   metformin (GLUCOPHAGE) 1000 MG tablet  MAR from Other Facility Yes No   Sig: Take 1,000 mg by mouth 2 times a day.   montelukast (SINGULAIR) 10 MG Tab  MAR from Other Facility Yes No   Sig: Take 10 mg by mouth every evening.   omeprazole (PRILOSEC) 20 MG delayed-release capsule  MAR from Other Facility Yes No   Sig: Take 20 mg by mouth 2 times a day.    ondansetron (ZOFRAN ODT) 4 MG TABLET DISPERSIBLE   No No   Sig: Take 1 Tab by mouth every 6 hours as needed.   polyethylene glycol 3350 (MIRALAX) 17 GM/SCOOP Powder   No No   Sig: Take 17 g by mouth every day.   prazosin (MINIPRESS) 2 MG Cap  MAR from Other Facility Yes No   Sig: Take 4 mg by mouth every evening. 2 capsules = 4 mg   sertraline (ZOLOFT) 100 MG Tab  MAR from Other Facility Yes No   Sig: Take 100 mg by mouth every day.   topiramate (TOPAMAX) 25 MG Tab  MAR from Other Facility No No   Sig: Take 1 Tab by mouth 2 times a day.   Patient taking differently: Take 25 mg by mouth every day.   traZODone (DESYREL) 100 MG Tab  MAR from Other Facility Yes No   Sig: Take 100 mg by mouth at bedtime as needed.      Facility-Administered Medications: None       Physical Exam  Temp:  [36.7 °C (98 °F)] 36.7 °C (98 °F)  Pulse:  [64-68] 64  Resp:  [14] 14  BP: (127)/(70-73) 127/70  SpO2:  [96 %] 96 %    Physical Exam  Vitals signs reviewed.   Constitutional:       General: He is not in acute distress.     Appearance: Normal appearance.   HENT:      Head: Normocephalic and atraumatic.      Nose: No congestion or rhinorrhea.   Eyes:      Extraocular Movements: Extraocular movements intact.      Pupils: Pupils are equal, round, and reactive to light.   Neck:      Musculoskeletal: Normal range of motion and neck supple.   Cardiovascular:      Rate and Rhythm: Normal rate and regular rhythm.      Pulses: Normal pulses.   Pulmonary:      Effort: Pulmonary effort is normal. No respiratory distress.      Breath sounds: Normal breath sounds.   Abdominal:      General: Bowel sounds are normal. There is no distension.      Palpations: Abdomen is soft.      Tenderness: There is no abdominal tenderness.   Musculoskeletal:         General: No swelling or tenderness.   Skin:     General: Skin is warm.      Findings: No erythema.   Neurological:      General: No focal deficit present.      Mental Status: He is alert and oriented to  person, place, and time.         Laboratory:  Recent Labs     02/03/21  1239   WBC 8.1   RBC 4.69*   HEMOGLOBIN 13.3*   HEMATOCRIT 42.3   MCV 90.2   MCH 28.4   MCHC 31.4*   RDW 46.8   PLATELETCT 274   MPV 10.5     Recent Labs     02/03/21  1239   SODIUM 137   POTASSIUM 3.8   CHLORIDE 102   CO2 22   GLUCOSE 100*   BUN 26*   CREATININE 1.12   CALCIUM 9.3     Recent Labs     02/03/21  1239   ALTSGPT 11   ASTSGOT 12   ALKPHOSPHAT 49   TBILIRUBIN 0.3   GLUCOSE 100*     Recent Labs     02/03/21  1239   APTT 29.5   INR 0.96     No results for input(s): NTPROBNP in the last 72 hours.      Recent Labs     02/03/21  1239   TROPONINT 22*       Imaging:  DX-CHEST-PORTABLE (1 VIEW)   Final Result      No acute cardiopulmonary abnormality.      CT-CTA NECK WITH & W/O-POST PROCESSING   Final Result      1.  No high-grade stenosis, large vessel occlusion, aneurysm or dissection.   2.  A 3.4 cm heterogeneously enhancing mass in the neck, at the level of the thyroid cartilage. It may represent an ectopic thyroid tissue/nodule. It has grown slightly since 2015 study. Further evaluation with ultrasound is advised on an outpatient    basis.      CT-CTA HEAD WITH & W/O-POST PROCESS   Final Result      1.  CT angiogram of the [place] within normal limits.      2.  Abnormal low attenuation in the periventricular white matter again noted.      CT-HEAD W/O   Final Result      1.  No acute intracranial hemorrhage.      2.  Diffuse confluent periventricular hypodensity as on the prior MRI is consistent with extensive chronic demyelination or dysmyelination as described previously.               Assessment/Plan:  I anticipate this patient is appropriate for observation status at this time.    Acute right-sided weakness- (present on admission)  Assessment & Plan  CT scan of the head and CT angiogram head and neck showed no acute finding  Not a TPA candidate  On aspirin and statin  Neurology following and recommended EEG and  MRI    Seizure disorder (HCC)- (present on admission)  Assessment & Plan  Seizure precaution, aspiration precaution  Additional labs ordered by neurology  EEG and MRI  IV Ativan for breakthrough seizure  Continue Depakote and Topamax    Type 2 diabetes mellitus with hyperglycemia, without long-term current use of insulin (HCC)- (present on admission)  Assessment & Plan  Sliding scale insulin

## 2021-02-03 NOTE — CONSULTS
Chief Complaint   Patient presents with   • Weakness       Problem List Items Addressed This Visit     None        Neurology Stroke Alert Consultation     History of present illness:  This is a 40-year old male, well known to [facility] (most recent hospitalization last week) with an extensive PMhx most significant for anxiety/deoression, Bipolar, developmental delay, seizure disorder (On VPA and Topiramate) Type II diabetes mellitus, and hypothyroid who again presented to [facility] On 2/3/21 for a chief complaint of sudden onset Right sided weakness/paresthesia. The patient reports that he felt to be in his usual state of health this morning; at 1130, patient suddenly noted RUE/RLE weakness and associated progressive numbness/tingling sensation. He thus called EMS and was brought to the ED. Here, SBP 120s; BG WNL. CT Head unremarkable; CTA head/neck with no LVO. Patient ultimately determined not to be a candidate for IV tPA secondary to low suspicion for acute ischemic stroke, higher suspicion for break through seizure versus functional etiology (patient uncooperative with exam, was then witnessed to spontaneously/voluntarily move RUE; also with stuttered speech-- a common functional exam finding).   Currently, patient is lying in stretcher; awake and alert. Continues to admit to the above noted symptoms. He admits to mild headache. Denies dizziness. Denies recent fall or trauma.     Neurology has been consulted by Dr. Jayshree Neely to further evaluate the findings noted above.     Past medical history:   Past Medical History:   Diagnosis Date   • Anxiety     BIPOLAR   • ASTHMA    • Bipolar 1 disorder (HCC)    • Depression    • Diabetes ([facility])     Type II Diabetes   • Fall     passed out 2 wks ago   • Glaucoma    • Glaucoma 1982    both eyes/ blind on left eye   • Hypothyroidism    • Indigestion     once in a while   • Mental disorder     learning disabilities; speech impairment; developmental delays   •  Murmur     since birth   • Pneumonia     remote   • Psychiatric problem     PTSD   • S/P thyroidectomy    • Seizure (HCC)    • Seizure disorder (HCC)    • Unspecified disorder of thyroid        Past surgical history:   Past Surgical History:   Procedure Laterality Date   • EYE SURGERY     • OTHER      Hernia Repair when he was 10 yrs old   • THYROID LOBECTOMY         Family history:   Family History   Problem Relation Age of Onset   • Hypertension Mother    • Heart Disease Mother    • Lung Disease Mother    • Stroke Maternal Grandmother        Social history:   Social History     Socioeconomic History   • Marital status: Single     Spouse name: Not on file   • Number of children: Not on file   • Years of education: Not on file   • Highest education level: Not on file   Occupational History   • Not on file   Social Needs   • Financial resource strain: Somewhat hard   • Food insecurity     Worry: Sometimes true     Inability: Sometimes true   • Transportation needs     Medical: No     Non-medical: No   Tobacco Use   • Smoking status: Current Every Day Smoker     Packs/day: 0.25     Types: Cigarettes, Cigars     Last attempt to quit: 2020     Years since quittin.6   • Smokeless tobacco: Never Used   • Tobacco comment: 3 cigarettes a day   Substance and Sexual Activity   • Alcohol use: Not Currently     Frequency: 2-3 times a week   • Drug use: Yes     Types: Inhaled     Comment: marijuana   • Sexual activity: Not on file   Lifestyle   • Physical activity     Days per week: Not on file     Minutes per session: Not on file   • Stress: Not on file   Relationships   • Social connections     Talks on phone: Not on file     Gets together: Not on file     Attends Muslim service: Not on file     Active member of club or organization: Not on file     Attends meetings of clubs or organizations: Not on file     Relationship status: Not on file   • Intimate partner violence     Fear of current or ex partner: Not  "on file     Emotionally abused: Not on file     Physically abused: Not on file     Forced sexual activity: Not on file   Other Topics Concern   • Not on file   Social History Narrative   • Not on file       Current medications:   No current facility-administered medications for this encounter.      Current Outpatient Medications   Medication   • polyethylene glycol 3350 (MIRALAX) 17 GM/SCOOP Powder   • ondansetron (ZOFRAN ODT) 4 MG TABLET DISPERSIBLE   • ketorolac (TORADOL) 10 MG Tab   • lidocaine (LIDODERM) 5 % Patch   • omeprazole (PRILOSEC) 20 MG delayed-release capsule   • divalproex (DEPAKOTE) 500 MG Tablet Delayed Response   • acetaminophen (TYLENOL) 500 MG Tab   • divalproex (DEPAKOTE) 500 MG Tablet Delayed Response   • divalproex (DEPAKOTE) 500 MG Tablet Delayed Response   • topiramate (TOPAMAX) 25 MG Tab   • levothyroxine (SYNTHROID) 25 MCG Tab   • budesonide-formoterol (SYMBICORT) 160-4.5 MCG/ACT Aerosol   • famotidine (PEPCID) 10 MG tablet   • levothyroxine (SYNTHROID) 200 MCG Tab   • traZODone (DESYREL) 100 MG Tab   • LATUDA 20 MG Tab   • Cholecalciferol (VITAMIN D) 2000 UNIT Tab   • sertraline (ZOLOFT) 100 MG Tab   • fenofibrate (ANTARA) 130 MG capsule   • Empagliflozin (JARDIANCE) 25 MG Tab   • lisinopril (PRINIVIL) 10 MG Tab   • montelukast (SINGULAIR) 10 MG Tab   • aspirin EC (ECOTRIN) 81 MG Tablet Delayed Response   • atorvastatin (LIPITOR) 80 MG tablet   • busPIRone (BUSPAR) 10 MG Tab tablet   • glimepiride (AMARYL) 4 MG Tab   • metformin (GLUCOPHAGE) 1000 MG tablet   • prazosin (MINIPRESS) 2 MG Cap       Medication Allergy:  Allergies   Allergen Reactions   • Geodon [Ziprasidone Hcl] Anaphylaxis     Anaphylaxis per patient   • Abilify      \"Feeling tired, like I don't even know whats going on around me\"   • Fish      Pt reports fish causes him to be sick to his stomach         Review of systems:   Constitutional: denies fever, night sweats, weight loss.   Eyes: denies acute vision change, eye pain " or secretion.   Ears, Nose, Mouth, Throat: denies nasal secretion, nasal bleeding, difficulty swallowing, hearing loss, tinnitus, vertigo, ear pain, acute dental problems, oral ulcers or lesions.   Endocrine: denies recent weight changes, heat or cold intolerance, polyuria, polydypsia, polyphagia,abnormal hair growth.  Cardiovascular: denies new onset of chest pain, palpitations, syncope, or dyspnea of exertion.  Pulmonary: denies shortness of breath, new onset of cough, hemoptysis, wheezing, chest pain or flu-like symptoms.   GI: denies nausea, vomiting, diarrhea, GI bleeding, change in appetite, abdominal pain, and change in bowel habits.  : denies dysuria, urinary incontinence, hematuria.  Heme/oncology: denies history of easy bruising or bleeding. No history of cancer, DVTor PE.  Allergy/immunology: denies hives/urticaria, or itching.   Dermatologic: denies new rash, or new skin lesions.  Musculoskeletal:denies joint swelling or pain, muscle pain, neck and back pain.   Neurologic: As noted above.   Psychiatric: denies symptoms of depression, anxiety, hallucinations, mood swings or changes, suicidal or homicidal thoughts.     Physical examination:   Vitals:    02/03/21 1257 02/03/21 1259 02/03/21 1300 02/03/21 1302   BP: 127/73  127/70    Pulse: 68  66    Resp:    14   Temp:  36.7 °C (98 °F)     SpO2:    96%   Weight:  (!) 135 kg (297 lb)       General: Patient in no acute distress, pleasant and cooperative.  HEENT: Normocephalic, no signs of acute trauma.   Neck: supple, no meningeal signs or carotid bruits. There is normal range of motion. No tenderness on exam.   Chest: clear to auscultation. No cough.   CV: RRR, no murmurs.   Skin: no signs of acute rashes or trauma.   Musculoskeletal: joints exhibit full range of motion, without any pain to palpation. There are no signs of joint or muscle swelling. There is no tenderness to deep palpation of muscles.   Psychiatric: No hallucinatory behavior. Denies  symptoms of depression or suicidal ideation. Mood and affect appear normal on exam.     NEUROLOGICAL EXAM:   Mental status, orientation: Awake, alert and fully oriented.   Speech and language: speech is stuttered; non aphasic, non dysarthric. The patient is able to name, repeat and comprehend.   Cranial nerve exam: Pupils are 3-4 mm bilaterally and equally reactive to light. Visual fields are intact by confrontation on the Right; patient is blind in the Left eye (chronic/baseline). There is no nystagmus on primary or secondary gaze. Intact full EOM in all directions of gaze. Face appears symmetric. Sensation in the face is intact to light touch. Uvula is midline. Palate elevates symmetrically. Tongue is midline and without any signs of tongue biting or fasciculations Shoulder shrug is intact bilaterally.   Motor exam: Strength is 5/5 in LUE/LLE. 0/5 to RUE/RLE with positive Romero's. Tone is normal. No abnormal movements were seen on exam.   Sensory exam Decreased to light touch and nox stim to RUE/RLE. Otherwise normal sensation.   Deep tendon reflexes:  Plantar responses are flexor. There is no clonus.   Coordination: shows a normal finger-nose-finger on the left; patient does not participate on the Right..   Gait: Not assessed at this time as patient is a fall risk.       NIH Stroke Scale    1a Level of Consciousness   1b Orientation Questions   1c Response to Commands   2 Gaze   3 Visual Fields   4 Facial Movement   5 Motor Function (arm)   a Left   b Right 3  6 Motor Function (leg)   a Left   b Right 3  7 Limb Ataxia   8 Sensory 1  9 Language   10 Articulation   11 Extinction/Inattention     Score: 7    ANCILLARY DATA REVIEWED:     Lab Data Review:  Recent Results (from the past 24 hour(s))   CBC WITH DIFFERENTIAL    Collection Time: 02/03/21 12:39 PM   Result Value Ref Range    WBC 8.1 4.8 - 10.8 K/uL    RBC 4.69 (L) 4.70 - 6.10 M/uL    Hemoglobin 13.3 (L) 14.0 - 18.0 g/dL    Hematocrit 42.3 42.0 - 52.0 %     MCV 90.2 81.4 - 97.8 fL    MCH 28.4 27.0 - 33.0 pg    MCHC 31.4 (L) 33.7 - 35.3 g/dL    RDW 46.8 35.9 - 50.0 fL    Platelet Count 274 164 - 446 K/uL    MPV 10.5 9.0 - 12.9 fL    Neutrophils-Polys 52.70 44.00 - 72.00 %    Lymphocytes 36.60 22.00 - 41.00 %    Monocytes 7.30 0.00 - 13.40 %    Eosinophils 1.40 0.00 - 6.90 %    Basophils 0.60 0.00 - 1.80 %    Immature Granulocytes 1.40 (H) 0.00 - 0.90 %    Nucleated RBC 0.00 /100 WBC    Neutrophils (Absolute) 4.28 1.82 - 7.42 K/uL    Lymphs (Absolute) 2.97 1.00 - 4.80 K/uL    Monos (Absolute) 0.59 0.00 - 0.85 K/uL    Eos (Absolute) 0.11 0.00 - 0.51 K/uL    Baso (Absolute) 0.05 0.00 - 0.12 K/uL    Immature Granulocytes (abs) 0.11 0.00 - 0.11 K/uL    NRBC (Absolute) 0.00 K/uL   PROTHROMBIN TIME    Collection Time: 21 12:39 PM   Result Value Ref Range    PT 13.1 12.0 - 14.6 sec    INR 0.96 0.87 - 1.13   APTT    Collection Time: 21 12:39 PM   Result Value Ref Range    APTT 29.5 24.7 - 36.0 sec   EKG (NOW)    Collection Time: 21 12:59 PM   Result Value Ref Range    Report       [facility]    Test Date:  2021  Pt Name:    HOLLY KIM                 Department: ER  MRN:        [id number]                      Room:       RD 02  Gender:     Male                         Technician: [id number]  :        1980                   Requested By:RUCHI CAAL  Order #:    [id number]                    Reading MD: RUCHI CAAL MD    Measurements  Intervals                                Axis  Rate:       68                           P:          47  NM:         180                          QRS:        3  QRSD:       120                          T:          32  QT:         401  QTc:        427    Interpretive Statements  Sinus rhythm  Nonspecific intraventricular conduction delay  Baseline wander in lead(s) II,III,aVF,V2  Compared to ECG 2021 22:17:06  Intraventricular conduction delay now present  ST (T wave)  deviation no longer present  T-wave abnormality no longer present  Q waves no longer present  no acute ST chase nges  Electronically Signed On 2-3-2021 13:02:04 PST by RUCHI CAAL MD         Labs reviewed by me.     Records reviewed:   Reviewed records from patient's previous encounters at [facility].       Imaging reviewed by me:     CT-CTA NECK WITH & W/O-POST PROCESSING   Final Result      1.  No high-grade stenosis, large vessel occlusion, aneurysm or dissection.   2.  A 3.4 cm heterogeneously enhancing mass in the neck, at the level of the thyroid cartilage. It may represent an ectopic thyroid tissue/nodule. It has grown slightly since 2015 study. Further evaluation with ultrasound is advised on an outpatient    basis.      CT-CTA HEAD WITH & W/O-POST PROCESS   Final Result      1.  CT angiogram of the [place] within normal limits.      2.  Abnormal low attenuation in the periventricular white matter again noted.      CT-HEAD W/O   Final Result      1.  No acute intracranial hemorrhage.      2.  Diffuse confluent periventricular hypodensity as on the prior MRI is consistent with extensive chronic demyelination or dysmyelination as described previously.         DX-CHEST-PORTABLE (1 VIEW)    (Results Pending)         Presumed mechanism by TOAST:  __Large Artery Atherosclerosis  __Small Vessel (Lacunar)  __Cardioembolic  __Other (Sickle Cell, Vasculitis, Hypercoagulable)  _X_Unknown    Modified [place] Scale (MRS): 1 = No significant disability, despite symptoms; able to perform all usual duties and activities      ASSESSMENT AND PLAN:  40-year old male, well known to [facility] (most recent hospitalization last week) with an extensive PMhx most significant for anxiety/deoression, Bipolar, developmental delay, seizure disorder (On VPA and Topiramate) Type II diabetes mellitus, and hypothyroid who again presented to [facility] On 2/3/21 for a chief complaint of sudden onset Right sided  weakness/paresthesia; CT head with no acute intracranial abnormality; CTA head/neck with no LVO. Patient ultimately determined not to be a candidate for IV tPA secondary to low suspicion for acute ischemic stroke, higher suspicion for break through seizure versus functional/psychogenic etiology (patient uncooperative with exam, was then witnessed to spontaneously/voluntarily move RUE; also with stuttered speech-- a common functional exam finding). Will pursue MRI Brain and EEG.     Recommendations/Plan:     -q4h and PRN neuro assessment. VS per nursing/unit protocol. BP goal < 140/90.   -Obtain MRI Brain w/wo contrast.   -Obtain EEG.   -Check VPA, Topiramate levels; UA, UDS, ETOH, B12, Folate, TSH, RPR.   -Perform med rec given Polypharmacy.   -Will follow up with results of the above and make additional recommendations accordingly. All other medical management per primary.     The plan of care above has been discussed with Dr. Mccoy.     SATHYA SaenzP.R.MILE.  [facility] [employer] [facility]

## 2021-02-03 NOTE — ED TRIAGE NOTES
Pt reports right arm and right leg flaccid since 1100 today.  Able to lift/move right arm when asked. No other deficits.

## 2021-02-03 NOTE — PROCEDURES
VIDEO ELECTROENCEPHALOGRAM REPORT      Referring provider: Dr. Pacheco    DOS: 02/03/21 (0 hours and 25 minutes of total recording time).     INDICATION:  Norm Prabhakar 40 y.o. male presenting with seizures    CURRENT ANTIEPILEPTIC AND/OR SEDATING REGIMEN: depakote    TECHNIQUE: VEEG was set up by a Neurodiagnostic technologist who performed education to the patient and staff. A minimum of 23 electrodes and 23 channel recording was setup and performed by Neurodiagnostic technologist, in accordance with the international 10-20 system. The study was reviewed in bipolar and referential montages. The recording examined the patient in the awake and drowsy state(s).     DESCRIPTION OF THE RECORD:  During wakefulness, the background was continuous and showed a 9 Hz posterior dominant rhythm.  There was reactivity to eye closure/opening.   During drowsiness, theta/delta frequencies were seen.    Sleep was not captured.    ACTIVATION PROCEDURES:   Intermittent Photic stimulation was performed in a stepwise fashion from 1 to 30 Hz, and did not elicit any abnormal responses.      ICTAL AND INTERICTAL FINDINGS:   Abundant independent multifocal spikes/polyspikes seen in the left greater than right temporal lobes, occipital lobes, or parietal lobes, often periodic or rhythmic 1.5 to 3 Hz for 2 to 8 seconds.    Frequent left and right temporal rhythmic delta slowing at 2 to 3 Hz, often with embedded spikes, occurring both synchronously as well as asynchronously.    Occasional right frontal delta slowing    EKG: sampling of the EKG recording showed a regular rate    EVENTS:  None    INTERPRETATION:  Abnormal video EEG recording in the awake and drowsy state(s):  - Abundant independent multifocal spikes/polyspikes seen in the left greater than right temporal lobes, occipital lobes, or parietal lobes, often periodic or rhythmic at 1.5 to 3 Hz for 2 to 8 seconds. In addition, there is frequent bitemporal rhythmic delta slowing at 2 to  3 Hz, often with embedded spikes, occurring both synchronously as well as asynchronously.  The combination of the above findings are suggestive of a very high risk for seizures arising from multiple areas in the brain.  -Occasional right frontal slowing suggestive of focal dysfunction in this region.  Clinical and radiographic correlation recommended.  -No definitive electroclinical seizures.  However, patient is at high risk for seizures.  Patient is not back to his neurological baseline with consider more prolonged monitoring to rule out clinical and subclinical seizures.          Robbin Wilkins MD  Epilepsy and General Neurology  Department of Neurology  Instructor of Clinical Neurology [facility].   Office: [phone]  Fax: [phone]

## 2021-02-03 NOTE — ED NOTES
Med rec complete Per MAR ([facility])  Allergies reviewed and updated per MAR   No ABX in 14 days per MAR     Pharmacy-[facility]

## 2021-02-03 NOTE — ED PROVIDER NOTES
"ED Provider Note    CHIEF COMPLAINT  Chief Complaint   Patient presents with   • Weakness       HPI  Norm Prabhakar is a 40 y.o. male with history of diabetes, hypothyroidism, and seizure disorder who presents complaining of right-sided upper extremity weakness and numbness.    Per EMS, the patient is coming from [facility].  He reported neurologic symptoms that began at approximately 11 AM.  No blood sugar obtained.    Patient states that these symptoms began in his right upper extremity and then progressed into his right lower extremity.  He also reports slurred speech.      ALLERGIES  Allergies   Allergen Reactions   • Geodon [Ziprasidone Hcl] Anaphylaxis     Anaphylaxis per patient   • Abilify      \"Feeling tired, like I don't even know whats going on around me\"   • Fish      Pt reports fish causes him to be sick to his stomach  Not listed on MAR noted 2/3/2021         CURRENT MEDICATIONS  Home Medications     Reviewed by Benjamin Fuentes (Pharmacy Tech) on 02/03/21 at 1511  Med List Status: Complete   Medication Last Dose Status   acetaminophen (TYLENOL) 500 MG Tab 2/2/2021 Active   aspirin EC (ECOTRIN) 81 MG Tablet Delayed Response 2/3/2021 Active   atorvastatin (LIPITOR) 80 MG tablet 2/2/2021 Active   budesonide-formoterol (SYMBICORT) 160-4.5 MCG/ACT Aerosol 2/3/2021 Active   busPIRone (BUSPAR) 10 MG Tab tablet 2/3/2021 Active   Cholecalciferol (VITAMIN D3) 2000 UNIT Cap 2/2/2021 Active   divalproex (DEPAKOTE) 500 MG Tablet Delayed Response Not Taking Active   divalproex (DEPAKOTE) 500 MG Tablet Delayed Response Not Taking Active   divalproex (DEPAKOTE) 500 MG Tablet Delayed Response 2/3/2021 Active   Empagliflozin (JARDIANCE) 25 MG Tab 2/3/2021 Active   Fenofibrate 134 MG Cap 2/2/2021 Active   ibuprofen (MOTRIN) 600 MG Tab 2/2/2021 Active   insulin glargine (BASAGLAR KWIKPEN) 100 UNIT/ML injection PEN 2/3/2021 Active   ketorolac (TORADOL) 10 MG Tab Not Taking Active   levothyroxine (SYNTHROID) " 200 MCG Tab 2/3/2021 Active   lidocaine (LIDODERM) 5 % Patch Not Taking Active   lisinopril (PRINIVIL) 10 MG Tab 2/3/2021 Active   lurasidone (LATUDA) 20 MG Tab 2021 Active   metformin (GLUCOPHAGE) 1000 MG tablet 2/3/2021 Active   montelukast (SINGULAIR) 10 MG Tab 2021 Active   Omega-3 Fatty Acids (FISH OIL) 1000 MG Cap capsule 2/3/2021 Active   ondansetron (ZOFRAN ODT) 4 MG TABLET DISPERSIBLE Not Taking Active   polyethylene glycol 3350 (MIRALAX) 17 GM/SCOOP Powder Not Taking Active   prazosin (MINIPRESS) 2 MG Cap 2021 Active   sertraline (ZOLOFT) 100 MG Tab 2/3/2021 Active   topiramate (TOPAMAX) 25 MG Tab Not Taking Active   topiramate (TOPAMAX) 25 MG Tab 2/3/2021 Active                PAST MEDICAL HISTORY   has a past medical history of Anxiety, ASTHMA, Bipolar 1 disorder (), Depression, Diabetes (), Fall, Glaucoma, Glaucoma (), Hypothyroidism, Indigestion, Mental disorder, Murmur, Pneumonia, Psychiatric problem (), S/P thyroidectomy, Seizure ([facility]) (), Seizure disorder ([facility]), and Unspecified disorder of thyroid.    SURGICAL HISTORY   has a past surgical history that includes eye surgery; thyroid lobectomy; and other.    SOCIAL HISTORY  Social History     Tobacco Use   • Smoking status: Current Every Day Smoker     Packs/day: 0.25     Types: Cigarettes, Cigars     Last attempt to quit: 2020     Years since quittin.6   • Smokeless tobacco: Never Used   • Tobacco comment: 3 cigarettes a day   Substance and Sexual Activity   • Alcohol use: Not Currently     Frequency: 2-3 times a week   • Drug use: Yes     Types: Inhaled     Comment: marijuana   • Sexual activity: Not on file       Family Hx:  No history of CVA      REVIEW OF SYSTEMS  See HPI for further details.  Unable to obtain a full review of systems given patient's speech disturbance upon initial evaluation    PHYSICAL EXAM  VITAL SIGNS: /68   Pulse 60   Temp 36.6 °C (97.8 °F) (Temporal)   Resp 17   Wt (!) 135 kg  (297 lb 13.5 oz)   SpO2 95%   BMI 34.42 kg/m²     General:  WD overweight male, alert, afebrile  Skin: warm and dry; good color; no rash  HEENT: NCAT; right eyelid partially closed; EOMs intact; PERRL; no scleral icterus   Neck: Soft no meningismus, no LAD  Cardiovascular: Regular heart rate and rhythm.  No murmurs, rubs, or gallops; pulses 2+ bilaterally radially and DP areas  Lungs: Clear to auscultation with good air movement bilaterally.  No wheezes, rhonchi, or rales.   Abdomen: BS present; soft; NTND; no rebound, guarding, or rigidity.  No organomegaly or pulsatile mass  Extremities: TOLBERT x 4; no e/o trauma; no pedal edema; neg Florencio's  Neurologic: CNs III-XII formally intact; speech stuttered; distal sensation intact; no spontaneous movement noted on the right side; no neglect; strength 5/5 UE/LEs; DTRs 2+ bilaterally in patellar/BR areas  Psychiatric: Appropriate affect, normal mood    LABS  Results for orders placed or performed during the hospital encounter of 02/03/21   CBC WITH DIFFERENTIAL   Result Value Ref Range    WBC 8.1 4.8 - 10.8 K/uL    RBC 4.69 (L) 4.70 - 6.10 M/uL    Hemoglobin 13.3 (L) 14.0 - 18.0 g/dL    Hematocrit 42.3 42.0 - 52.0 %    MCV 90.2 81.4 - 97.8 fL    MCH 28.4 27.0 - 33.0 pg    MCHC 31.4 (L) 33.7 - 35.3 g/dL    RDW 46.8 35.9 - 50.0 fL    Platelet Count 274 164 - 446 K/uL    MPV 10.5 9.0 - 12.9 fL    Neutrophils-Polys 52.70 44.00 - 72.00 %    Lymphocytes 36.60 22.00 - 41.00 %    Monocytes 7.30 0.00 - 13.40 %    Eosinophils 1.40 0.00 - 6.90 %    Basophils 0.60 0.00 - 1.80 %    Immature Granulocytes 1.40 (H) 0.00 - 0.90 %    Nucleated RBC 0.00 /100 WBC    Neutrophils (Absolute) 4.28 1.82 - 7.42 K/uL    Lymphs (Absolute) 2.97 1.00 - 4.80 K/uL    Monos (Absolute) 0.59 0.00 - 0.85 K/uL    Eos (Absolute) 0.11 0.00 - 0.51 K/uL    Baso (Absolute) 0.05 0.00 - 0.12 K/uL    Immature Granulocytes (abs) 0.11 0.00 - 0.11 K/uL    NRBC (Absolute) 0.00 K/uL   COMP METABOLIC PANEL   Result Value  Ref Range    Sodium 137 135 - 145 mmol/L    Potassium 3.8 3.6 - 5.5 mmol/L    Chloride 102 96 - 112 mmol/L    Co2 22 20 - 33 mmol/L    Anion Gap 13.0 7.0 - 16.0    Glucose 100 (H) 65 - 99 mg/dL    Bun 26 (H) 8 - 22 mg/dL    Creatinine 1.12 0.50 - 1.40 mg/dL    Calcium 9.3 8.5 - 10.5 mg/dL    AST(SGOT) 12 12 - 45 U/L    ALT(SGPT) 11 2 - 50 U/L    Alkaline Phosphatase 49 30 - 99 U/L    Total Bilirubin 0.3 0.1 - 1.5 mg/dL    Albumin 4.3 3.2 - 4.9 g/dL    Total Protein 6.8 6.0 - 8.2 g/dL    Globulin 2.5 1.9 - 3.5 g/dL    A-G Ratio 1.7 g/dL   PROTHROMBIN TIME   Result Value Ref Range    PT 13.1 12.0 - 14.6 sec    INR 0.96 0.87 - 1.13   APTT   Result Value Ref Range    APTT 29.5 24.7 - 36.0 sec   COD (ADULT)   Result Value Ref Range    ABO Grouping Only A     Rh Grouping Only POS     Antibody Screen-Cod NEG    TROPONIN   Result Value Ref Range    Troponin T 22 (H) 6 - 19 ng/L   URINALYSIS    Specimen: Urine   Result Value Ref Range    Color Yellow     Character Clear     Ph 5.0 5.0 - 8.0    Glucose >=1000 (A) Negative mg/dL    Ketones Trace (A) Negative mg/dL    Protein Negative Negative mg/dL    Bilirubin Negative Negative    Urobilinogen, Urine 1.0 Negative    Nitrite Negative Negative    Leukocyte Esterase Negative Negative    Occult Blood Negative Negative    Micro Urine Req see below    URINE DRUG SCREEN   Result Value Ref Range    Amphetamines Urine Negative Negative    Barbiturates Negative Negative    Benzodiazepines Negative Negative    Cocaine Metabolite Negative Negative    Methadone Negative Negative    Opiates Negative Negative    Oxycodone Negative Negative    Phencyclidine -Pcp Negative Negative    Propoxyphene Negative Negative    Cannabinoid Metab Negative Negative   VALPROIC ACID   Result Value Ref Range    Valproic Acid 66.1 50.0 - 100.0 ug/mL   ESTIMATED GFR   Result Value Ref Range    GFR If African American >60 >60 mL/min/1.73 m 2    GFR If Non African American >60 >60 mL/min/1.73 m 2   SARS-CoV-2  PCR (24 hour In-House): Collect NP swab in VTM    Specimen: Respirate   Result Value Ref Range    SARS-CoV-2 Source NP Swab     SARS-CoV-2 by PCR NotDetected    REFRACTOMETER SG   Result Value Ref Range    Specific Gravity 1.045    DIAGNOSTIC ALCOHOL   Result Value Ref Range    Diagnostic Alcohol <10.1 0.0 - 10.0 mg/dL   FOLATE   Result Value Ref Range    Folate -Folic Acid 7.1 >4.0 ng/mL   Hemoglobin A1C   Result Value Ref Range    Glycohemoglobin 9.0 (H) 0.0 - 5.6 %    Est Avg Glucose 212 mg/dL   T.PALLIDUM AB EIA   Result Value Ref Range    Syphilis, Treponemal Qual Non-Reactive Non-Reactive   TSH   Result Value Ref Range    TSH 48.600 (H) 0.380 - 5.330 uIU/mL   VITAMIN B12   Result Value Ref Range    Vitamin B12 -True Cobalamin 367 211 - 911 pg/mL   ACCU-CHEK GLUCOSE   Result Value Ref Range    Glucose - Accu-Ck 71 65 - 99 mg/dL   EKG (NOW)   Result Value Ref Range    Report       [facility] Emergency Dept.    Test Date:  2021  Pt Name:    HOLLY KIM                 Department: ER  MRN:        [id number]                      Room:       [facility]  Gender:     Male                         Technician: [id number]  :        1980                   Requested By:RUCHI CAAL  Order #:    [id number]                    Reading MD: RUCHI CAAL MD    Measurements  Intervals                                Axis  Rate:       68                           P:          47  WI:         180                          QRS:        3  QRSD:       120                          T:          32  QT:         401  QTc:        427    Interpretive Statements  Sinus rhythm  Nonspecific intraventricular conduction delay  Baseline wander in lead(s) II,III,aVF,V2  Compared to ECG 2021 22:17:06  Intraventricular conduction delay now present  ST (T wave) deviation no longer present  T-wave abnormality no longer present  Q waves no longer present  no acute ST chase nges  Electronically Signed On 2-3-2021 13:02:04  PST by RUCHI CAAL MD           IMAGING  DX-CHEST-PORTABLE (1 VIEW)   Final Result      No acute cardiopulmonary abnormality.      CT-CTA NECK WITH & W/O-POST PROCESSING   Final Result      1.  No high-grade stenosis, large vessel occlusion, aneurysm or dissection.   2.  A 3.4 cm heterogeneously enhancing mass in the neck, at the level of the thyroid cartilage. It may represent an ectopic thyroid tissue/nodule. It has grown slightly since 2015 study. Further evaluation with ultrasound is advised on an outpatient    basis.      CT-CTA HEAD WITH & W/O-POST PROCESS   Final Result      1.  CT angiogram of the [place] within normal limits.      2.  Abnormal low attenuation in the periventricular white matter again noted.      CT-HEAD W/O   Final Result      1.  No acute intracranial hemorrhage.      2.  Diffuse confluent periventricular hypodensity as on the prior MRI is consistent with extensive chronic demyelination or dysmyelination as described previously.               MEDICAL RECORD  I have reviewed patient's medical record and pertinent results are listed below.      COURSE & MEDICAL DECISION MAKING  I have reviewed any medical record information, laboratory studies and radiographic results as noted.    Norm Prabhakar is a 40 y.o. male with a history of seizure disorder who presents complaining of right sided numbness and weakness in his upper and lower extremities along with a speech issue.  Patient came in as a stroke alert.  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  Neurology met with the patient at the charge desk as well.  Their recommendations are pending.  We agreed that no TPA was indicated.  No immediate recommendations for antiepileptics at this time.    Appropriate PPE was worn at all times while interacting with the patient, including goggles, N95 mask, and surgical mask.    NIHSS 10    Patient is not a candidate for alteplase as this is likely  seizure/jacksonian march rather than CVA.    Pt was re-evaluated.  Patient's NIH stroke scale was 10.  However, the patient withdraws easily when I test for a Babinski.  I was also advised by the RN that he moved his right upper extremity when asked to do so while transferring onto the CT scanning table.    CTA of the head and neck are negative for acute pathology.  Patient will need further work-up for altered mental state and neurologic symptoms per neurology recommendations.    1:40 PM  I discussed the case with Dr. Pacheco who agrees to hospitalize the patient    The total critical care time on this patient is 40 minutes, resuscitating patient, speaking with admitting physician, and deciphering test results. This 40 minutes is exclusive of separately billable procedures.      FINAL IMPRESSION  1. Weakness of right upper extremity  REFERRAL TO PHYSIATRY (PMR)    REFERRAL TO NEUROLOGY   2. Weakness of right lower extremity         Electronically signed by: Jayshree Neely M.D., 2/3/2021 12:39 PM

## 2021-02-03 NOTE — DISCHARGE PLANNING
SW attempted to complete assessment with Pt.  Pt currently having procedure in room.     SW will attempt to complete assessment at a later time.

## 2021-02-04 PROBLEM — E87.6 HYPOKALEMIA: Status: ACTIVE | Noted: 2021-02-04

## 2021-02-04 LAB
ALBUMIN SERPL BCP-MCNC: 3.9 G/DL (ref 3.2–4.9)
ALBUMIN/GLOB SERPL: 1.9 G/DL
ALP SERPL-CCNC: 44 U/L (ref 30–99)
ALT SERPL-CCNC: 8 U/L (ref 2–50)
ANION GAP SERPL CALC-SCNC: 10 MMOL/L (ref 7–16)
AST SERPL-CCNC: 10 U/L (ref 12–45)
BILIRUB SERPL-MCNC: 0.4 MG/DL (ref 0.1–1.5)
BUN SERPL-MCNC: 24 MG/DL (ref 8–22)
CALCIUM SERPL-MCNC: 8.4 MG/DL (ref 8.5–10.5)
CHLORIDE SERPL-SCNC: 100 MMOL/L (ref 96–112)
CHOLEST SERPL-MCNC: 180 MG/DL (ref 100–199)
CO2 SERPL-SCNC: 23 MMOL/L (ref 20–33)
CREAT SERPL-MCNC: 1.02 MG/DL (ref 0.5–1.4)
ERYTHROCYTE [DISTWIDTH] IN BLOOD BY AUTOMATED COUNT: 47 FL (ref 35.9–50)
GLOBULIN SER CALC-MCNC: 2.1 G/DL (ref 1.9–3.5)
GLUCOSE BLD-MCNC: 135 MG/DL (ref 65–99)
GLUCOSE BLD-MCNC: 142 MG/DL (ref 65–99)
GLUCOSE BLD-MCNC: 142 MG/DL (ref 65–99)
GLUCOSE BLD-MCNC: 161 MG/DL (ref 65–99)
GLUCOSE BLD-MCNC: 78 MG/DL (ref 65–99)
GLUCOSE BLD-MCNC: 90 MG/DL (ref 65–99)
GLUCOSE BLD-MCNC: 95 MG/DL (ref 65–99)
GLUCOSE SERPL-MCNC: 180 MG/DL (ref 65–99)
HCT VFR BLD AUTO: 39.3 % (ref 42–52)
HDLC SERPL-MCNC: 31 MG/DL
HGB BLD-MCNC: 12.6 G/DL (ref 14–18)
LDLC SERPL CALC-MCNC: 88 MG/DL
MCH RBC QN AUTO: 29.3 PG (ref 27–33)
MCHC RBC AUTO-ENTMCNC: 32.1 G/DL (ref 33.7–35.3)
MCV RBC AUTO: 91.4 FL (ref 81.4–97.8)
PLATELET # BLD AUTO: 253 K/UL (ref 164–446)
PMV BLD AUTO: 10.4 FL (ref 9–12.9)
POTASSIUM SERPL-SCNC: 3.5 MMOL/L (ref 3.6–5.5)
PROT SERPL-MCNC: 6 G/DL (ref 6–8.2)
RBC # BLD AUTO: 4.3 M/UL (ref 4.7–6.1)
SODIUM SERPL-SCNC: 133 MMOL/L (ref 135–145)
T4 FREE SERPL-MCNC: 0.87 NG/DL (ref 0.93–1.7)
TRIGL SERPL-MCNC: 306 MG/DL (ref 0–149)
TROPONIN T SERPL-MCNC: 26 NG/L (ref 6–19)
WBC # BLD AUTO: 8.5 K/UL (ref 4.8–10.8)

## 2021-02-04 PROCEDURE — 99226 PR SUBSEQUENT OBSERVATION CARE,LEVEL III: CPT | Performed by: HOSPITALIST

## 2021-02-04 PROCEDURE — 99213 OFFICE O/P EST LOW 20 MIN: CPT | Performed by: NURSE PRACTITIONER

## 2021-02-04 PROCEDURE — 80201 ASSAY OF TOPIRAMATE: CPT

## 2021-02-04 PROCEDURE — 700101 HCHG RX REV CODE 250: Performed by: INTERNAL MEDICINE

## 2021-02-04 PROCEDURE — 36415 COLL VENOUS BLD VENIPUNCTURE: CPT

## 2021-02-04 PROCEDURE — 80061 LIPID PANEL: CPT

## 2021-02-04 PROCEDURE — 96372 THER/PROPH/DIAG INJ SC/IM: CPT

## 2021-02-04 PROCEDURE — G0378 HOSPITAL OBSERVATION PER HR: HCPCS

## 2021-02-04 PROCEDURE — 97166 OT EVAL MOD COMPLEX 45 MIN: CPT

## 2021-02-04 PROCEDURE — A9270 NON-COVERED ITEM OR SERVICE: HCPCS | Performed by: HOSPITALIST

## 2021-02-04 PROCEDURE — 97162 PT EVAL MOD COMPLEX 30 MIN: CPT

## 2021-02-04 PROCEDURE — 700102 HCHG RX REV CODE 250 W/ 637 OVERRIDE(OP): Performed by: INTERNAL MEDICINE

## 2021-02-04 PROCEDURE — 92610 EVALUATE SWALLOWING FUNCTION: CPT

## 2021-02-04 PROCEDURE — 700111 HCHG RX REV CODE 636 W/ 250 OVERRIDE (IP): Performed by: INTERNAL MEDICINE

## 2021-02-04 PROCEDURE — 84439 ASSAY OF FREE THYROXINE: CPT

## 2021-02-04 PROCEDURE — 99245 OFF/OP CONSLTJ NEW/EST HI 55: CPT | Performed by: PHYSICAL MEDICINE & REHABILITATION

## 2021-02-04 PROCEDURE — 84484 ASSAY OF TROPONIN QUANT: CPT

## 2021-02-04 PROCEDURE — 85027 COMPLETE CBC AUTOMATED: CPT

## 2021-02-04 PROCEDURE — 700102 HCHG RX REV CODE 250 W/ 637 OVERRIDE(OP): Performed by: HOSPITALIST

## 2021-02-04 PROCEDURE — 80053 COMPREHEN METABOLIC PANEL: CPT

## 2021-02-04 PROCEDURE — 700105 HCHG RX REV CODE 258: Performed by: STUDENT IN AN ORGANIZED HEALTH CARE EDUCATION/TRAINING PROGRAM

## 2021-02-04 PROCEDURE — A9270 NON-COVERED ITEM OR SERVICE: HCPCS | Performed by: INTERNAL MEDICINE

## 2021-02-04 PROCEDURE — 82962 GLUCOSE BLOOD TEST: CPT

## 2021-02-04 RX ORDER — DIVALPROEX SODIUM 500 MG/1
500 TABLET, DELAYED RELEASE ORAL EVERY MORNING
Status: DISCONTINUED | OUTPATIENT
Start: 2021-02-05 | End: 2021-02-08 | Stop reason: HOSPADM

## 2021-02-04 RX ORDER — BUSPIRONE HYDROCHLORIDE 10 MG/1
10 TABLET ORAL 3 TIMES DAILY
Status: DISCONTINUED | OUTPATIENT
Start: 2021-02-04 | End: 2021-02-04

## 2021-02-04 RX ORDER — LISINOPRIL 10 MG/1
10 TABLET ORAL DAILY
Status: DISCONTINUED | OUTPATIENT
Start: 2021-02-05 | End: 2021-02-08 | Stop reason: HOSPADM

## 2021-02-04 RX ORDER — FENOFIBRATE 134 MG/1
134 CAPSULE ORAL EVERY EVENING
Status: DISCONTINUED | OUTPATIENT
Start: 2021-02-04 | End: 2021-02-08 | Stop reason: HOSPADM

## 2021-02-04 RX ORDER — LURASIDONE HYDROCHLORIDE 20 MG/1
20 TABLET, FILM COATED ORAL
Status: DISCONTINUED | OUTPATIENT
Start: 2021-02-04 | End: 2021-02-08 | Stop reason: HOSPADM

## 2021-02-04 RX ORDER — DIVALPROEX SODIUM 500 MG/1
1500 TABLET, DELAYED RELEASE ORAL EVERY EVENING
Status: DISCONTINUED | OUTPATIENT
Start: 2021-02-05 | End: 2021-02-08 | Stop reason: HOSPADM

## 2021-02-04 RX ORDER — BUDESONIDE AND FORMOTEROL FUMARATE DIHYDRATE 160; 4.5 UG/1; UG/1
2 AEROSOL RESPIRATORY (INHALATION) 2 TIMES DAILY
Status: DISCONTINUED | OUTPATIENT
Start: 2021-02-04 | End: 2021-02-04

## 2021-02-04 RX ORDER — VITAMIN B COMPLEX
4000 TABLET ORAL
Status: DISCONTINUED | OUTPATIENT
Start: 2021-02-04 | End: 2021-02-08 | Stop reason: HOSPADM

## 2021-02-04 RX ORDER — DEXTROSE MONOHYDRATE 25 G/50ML
50 INJECTION, SOLUTION INTRAVENOUS
Status: DISCONTINUED | OUTPATIENT
Start: 2021-02-04 | End: 2021-02-08 | Stop reason: HOSPADM

## 2021-02-04 RX ORDER — DEXTROSE MONOHYDRATE 50 MG/ML
INJECTION, SOLUTION INTRAVENOUS CONTINUOUS
Status: DISCONTINUED | OUTPATIENT
Start: 2021-02-04 | End: 2021-02-04

## 2021-02-04 RX ORDER — LEVOTHYROXINE SODIUM 0.2 MG/1
200 TABLET ORAL
Status: DISCONTINUED | OUTPATIENT
Start: 2021-02-04 | End: 2021-02-04

## 2021-02-04 RX ORDER — PRAZOSIN HYDROCHLORIDE 2 MG/1
4 CAPSULE ORAL
Status: DISCONTINUED | OUTPATIENT
Start: 2021-02-04 | End: 2021-02-08 | Stop reason: HOSPADM

## 2021-02-04 RX ORDER — TOPIRAMATE 25 MG/1
25 TABLET ORAL DAILY
Status: DISCONTINUED | OUTPATIENT
Start: 2021-02-05 | End: 2021-02-08 | Stop reason: HOSPADM

## 2021-02-04 RX ORDER — DIVALPROEX SODIUM 500 MG/1
500-1500 TABLET, DELAYED RELEASE ORAL
Status: DISCONTINUED | OUTPATIENT
Start: 2021-02-04 | End: 2021-02-04

## 2021-02-04 RX ORDER — SERTRALINE HYDROCHLORIDE 100 MG/1
100 TABLET, FILM COATED ORAL DAILY
Status: DISCONTINUED | OUTPATIENT
Start: 2021-02-05 | End: 2021-02-08 | Stop reason: HOSPADM

## 2021-02-04 RX ORDER — ATORVASTATIN CALCIUM 80 MG/1
80 TABLET, FILM COATED ORAL EVERY EVENING
Status: DISCONTINUED | OUTPATIENT
Start: 2021-02-04 | End: 2021-02-04

## 2021-02-04 RX ORDER — MONTELUKAST SODIUM 10 MG/1
10 TABLET ORAL
Status: DISCONTINUED | OUTPATIENT
Start: 2021-02-04 | End: 2021-02-08 | Stop reason: HOSPADM

## 2021-02-04 RX ORDER — POTASSIUM CHLORIDE 20 MEQ/1
20 TABLET, EXTENDED RELEASE ORAL ONCE
Status: COMPLETED | OUTPATIENT
Start: 2021-02-04 | End: 2021-02-04

## 2021-02-04 RX ADMIN — MONTELUKAST 10 MG: 10 TABLET, FILM COATED ORAL at 20:18

## 2021-02-04 RX ADMIN — BUDESONIDE AND FORMOTEROL FUMARATE DIHYDRATE 2 PUFF: 160; 4.5 AEROSOL RESPIRATORY (INHALATION) at 17:41

## 2021-02-04 RX ADMIN — BUSPIRONE HYDROCHLORIDE 10 MG: 10 TABLET ORAL at 17:40

## 2021-02-04 RX ADMIN — SERTRALINE HYDROCHLORIDE 100 MG: 100 TABLET ORAL at 10:31

## 2021-02-04 RX ADMIN — ENOXAPARIN SODIUM 40 MG: 40 INJECTION SUBCUTANEOUS at 17:39

## 2021-02-04 RX ADMIN — ASPIRIN 81 MG: 81 TABLET, COATED ORAL at 09:19

## 2021-02-04 RX ADMIN — LURASIDONE HYDROCHLORIDE 20 MG: 20 TABLET, FILM COATED ORAL at 17:40

## 2021-02-04 RX ADMIN — OMEPRAZOLE 20 MG: 20 CAPSULE, DELAYED RELEASE ORAL at 09:17

## 2021-02-04 RX ADMIN — BUSPIRONE HYDROCHLORIDE 10 MG: 10 TABLET ORAL at 12:53

## 2021-02-04 RX ADMIN — INSULIN HUMAN 1 UNITS: 100 INJECTION, SOLUTION PARENTERAL at 20:33

## 2021-02-04 RX ADMIN — ACETAMINOPHEN 650 MG: 325 TABLET ORAL at 19:57

## 2021-02-04 RX ADMIN — BUDESONIDE AND FORMOTEROL FUMARATE DIHYDRATE 2 PUFF: 160; 4.5 AEROSOL RESPIRATORY (INHALATION) at 05:03

## 2021-02-04 RX ADMIN — FAMOTIDINE 10 MG: 20 TABLET ORAL at 17:40

## 2021-02-04 RX ADMIN — LEVOTHYROXINE SODIUM 225 MCG: 0.2 TABLET ORAL at 09:00

## 2021-02-04 RX ADMIN — FAMOTIDINE 10 MG: 20 TABLET ORAL at 09:18

## 2021-02-04 RX ADMIN — DOCUSATE SODIUM 50 MG AND SENNOSIDES 8.6 MG 2 TABLET: 8.6; 5 TABLET, FILM COATED ORAL at 17:39

## 2021-02-04 RX ADMIN — FENOFIBRATE 134 MG: 134 CAPSULE ORAL at 17:40

## 2021-02-04 RX ADMIN — DEXTROSE MONOHYDRATE 50 ML: 25 INJECTION, SOLUTION INTRAVENOUS at 02:26

## 2021-02-04 RX ADMIN — DEXTROSE MONOHYDRATE: 50 INJECTION, SOLUTION INTRAVENOUS at 02:42

## 2021-02-04 RX ADMIN — POTASSIUM CHLORIDE 20 MEQ: 1500 TABLET, EXTENDED RELEASE ORAL at 17:44

## 2021-02-04 RX ADMIN — METFORMIN HYDROCHLORIDE 1000 MG: 500 TABLET ORAL at 17:39

## 2021-02-04 RX ADMIN — OMEPRAZOLE 20 MG: 20 CAPSULE, DELAYED RELEASE ORAL at 17:39

## 2021-02-04 RX ADMIN — Medication 4000 UNITS: at 20:18

## 2021-02-04 RX ADMIN — ATORVASTATIN CALCIUM 80 MG: 80 TABLET, FILM COATED ORAL at 17:40

## 2021-02-04 ASSESSMENT — LIFESTYLE VARIABLES
SUBSTANCE_ABUSE: 1
SUBSTANCE_ABUSE: 0

## 2021-02-04 ASSESSMENT — ENCOUNTER SYMPTOMS
HEADACHES: 1
BRUISES/BLEEDS EASILY: 0
FOCAL WEAKNESS: 1
PHOTOPHOBIA: 1
DIZZINESS: 0
WEIGHT LOSS: 0
EYES NEGATIVE: 1
DIAPHORESIS: 0
VOMITING: 0
BLURRED VISION: 0
BACK PAIN: 0
DEPRESSION: 0
PSYCHIATRIC NEGATIVE: 1
CHILLS: 0
SORE THROAT: 0
SENSORY CHANGE: 1
STRIDOR: 0
SEIZURES: 1
ABDOMINAL PAIN: 1
RESPIRATORY NEGATIVE: 1
SHORTNESS OF BREATH: 0
ABDOMINAL PAIN: 0
SPEECH CHANGE: 1
COUGH: 0
GASTROINTESTINAL NEGATIVE: 1
WEAKNESS: 1
MUSCULOSKELETAL NEGATIVE: 1
CARDIOVASCULAR NEGATIVE: 1
NAUSEA: 0
TINGLING: 1
FLANK PAIN: 0
CONSTITUTIONAL NEGATIVE: 1
SINUS PAIN: 0
PALPITATIONS: 0
NECK PAIN: 0
FEVER: 0
MYALGIAS: 0

## 2021-02-04 ASSESSMENT — COGNITIVE AND FUNCTIONAL STATUS - GENERAL
DAILY ACTIVITIY SCORE: 15
EATING MEALS: A LITTLE
TOILETING: A LOT
MOBILITY SCORE: 14
CLIMB 3 TO 5 STEPS WITH RAILING: TOTAL
WALKING IN HOSPITAL ROOM: A LOT
HELP NEEDED FOR BATHING: A LOT
SUGGESTED CMS G CODE MODIFIER DAILY ACTIVITY: CK
TURNING FROM BACK TO SIDE WHILE IN FLAT BAD: A LITTLE
MOVING FROM LYING ON BACK TO SITTING ON SIDE OF FLAT BED: A LITTLE
PERSONAL GROOMING: A LITTLE
MOVING TO AND FROM BED TO CHAIR: A LITTLE
STANDING UP FROM CHAIR USING ARMS: A LOT
SUGGESTED CMS G CODE MODIFIER MOBILITY: CL
DRESSING REGULAR UPPER BODY CLOTHING: A LITTLE
DRESSING REGULAR LOWER BODY CLOTHING: A LOT

## 2021-02-04 ASSESSMENT — GAIT ASSESSMENTS: GAIT LEVEL OF ASSIST: UNABLE TO PARTICIPATE

## 2021-02-04 ASSESSMENT — ACTIVITIES OF DAILY LIVING (ADL): TOILETING: INDEPENDENT

## 2021-02-04 ASSESSMENT — PAIN DESCRIPTION - PAIN TYPE
TYPE: ACUTE PAIN
TYPE: ACUTE PAIN

## 2021-02-04 NOTE — PROGRESS NOTES
Pt with low BG overnight 67, IV dextrose given x2 since pt NPO. Pt states he did not eat prior to arrival in ED. On call paged D5 running continuously.

## 2021-02-04 NOTE — PROGRESS NOTES
Monitor summary: SB-SR 49-61, [place] .20, QRS .12, QT .40 with HR down to 45 per strip from monitor room.

## 2021-02-04 NOTE — PROGRESS NOTES
Chief Complaint   Patient presents with   • Weakness       Problem List Items Addressed This Visit     None      Visit Diagnoses     Weakness of right upper extremity        Relevant Orders    REFERRAL TO PHYSIATRY (PMR)    REFERRAL TO NEUROLOGY    Weakness of right lower extremity            Neurology Progress Note    History of present illness:  This is a 40-year old male, well known to [facility] (most recent hospitalization last week) with an extensive PMhx most significant for anxiety/deoression, Bipolar, developmental delay, seizure disorder (On VPA and Topiramate) Type II diabetes mellitus, and hypothyroid who again presented to [facility] On 2/3/21 for a chief complaint of sudden onset Right sided weakness/paresthesia. The patient reports that he felt to be in his usual state of health this morning; at 1130, patient suddenly noted RUE/RLE weakness and associated progressive numbness/tingling sensation. He thus called EMS and was brought to the ED. Here, SBP 120s; BG WNL. CT Head unremarkable; CTA head/neck with no LVO. Patient ultimately determined not to be a candidate for IV tPA secondary to low suspicion for acute ischemic stroke, higher suspicion for break through seizure versus functional etiology (patient uncooperative with exam, was then witnessed to spontaneously/voluntarily move RUE; also with stuttered speech-- a common functional exam finding).   Currently, patient is lying in stretcher; awake and alert. Continues to admit to the above noted symptoms. He admits to mild headache. Denies dizziness. Denies recent fall or trauma.     Neurology has been consulted by Dr. Jayshree Neely to further evaluate the findings noted above.     Interval, 2/4/21:   Patient sitting up in chair, awake, alert. Speech remains stuttered intermittently, appears distractible. Patient admits to persistent RUE/RLE flaccidity. Awaiting MRI today. No events overnight.     No changes to HPI as ws previously documented.      Past medical history:   Past Medical History:   Diagnosis Date   • Anxiety     BIPOLAR   • ASTHMA    • Bipolar 1 disorder ([facility])    • Depression    • Diabetes ([facility])     Type II Diabetes   • Fall     passed out 2 wks ago   • Glaucoma    • Glaucoma     both eyes/ blind on left eye   • Hypothyroidism    • Indigestion     once in a while   • Mental disorder     learning disabilities; speech impairment; developmental delays   • Murmur     since birth   • Pneumonia     remote   • Psychiatric problem     PTSD   • S/P thyroidectomy    • Seizure ([facility])    • Seizure disorder ([facility])    • Unspecified disorder of thyroid        Past surgical history:   Past Surgical History:   Procedure Laterality Date   • EYE SURGERY     • OTHER      Hernia Repair when he was 10 yrs old   • THYROID LOBECTOMY         Family history:   Family History   Problem Relation Age of Onset   • Hypertension Mother    • Heart Disease Mother    • Lung Disease Mother    • Stroke Maternal Grandmother        Social history:   Social History     Socioeconomic History   • Marital status: Single     Spouse name: Not on file   • Number of children: Not on file   • Years of education: Not on file   • Highest education level: Not on file   Occupational History   • Not on file   Social Needs   • Financial resource strain: Somewhat hard   • Food insecurity     Worry: Sometimes true     Inability: Sometimes true   • Transportation needs     Medical: No     Non-medical: No   Tobacco Use   • Smoking status: Current Every Day Smoker     Packs/day: 0.25     Types: Cigarettes, Cigars     Last attempt to quit: 2020     Years since quittin.6   • Smokeless tobacco: Never Used   • Tobacco comment: 3 cigarettes a day   Substance and Sexual Activity   • Alcohol use: Not Currently     Frequency: 2-3 times a week   • Drug use: Yes     Types: Inhaled     Comment: marijuana   • Sexual activity: Not on file   Lifestyle   • Physical activity     Days per week: Not  on file     Minutes per session: Not on file   • Stress: Not on file   Relationships   • Social connections     Talks on phone: Not on file     Gets together: Not on file     Attends [employer] service: Not on file     Active member of club or organization: Not on file     Attends meetings of clubs or organizations: Not on file     Relationship status: Not on file   • Intimate partner violence     Fear of current or ex partner: Not on file     Emotionally abused: Not on file     Physically abused: Not on file     Forced sexual activity: Not on file   Other Topics Concern   • Not on file   Social History Narrative   • Not on file       Current medications:   Current Facility-Administered Medications   Medication Dose   • 5% dextrose infusion     • aspirin EC (ECOTRIN) tablet 81 mg  81 mg   • atorvastatin (LIPITOR) tablet 80 mg  80 mg   • budesonide-formoterol (SYMBICORT) 160-4.5 MCG/ACT inhaler 2 Puff  2 Puff   • busPIRone (BUSPAR) tablet 10 mg  10 mg   • famotidine (PEPCID) tablet 10 mg  10 mg   • fenofibrate micronized (LOFIBRA) capsule 134 mg  134 mg   • levothyroxine (SYNTHROID) tablet 225 mcg  225 mcg   • sertraline (Zoloft) tablet 100 mg  100 mg   • traZODone (DESYREL) tablet 100 mg  100 mg   • Pharmacy consult request - Allow for permissive hypertension: SBP up to 220 mmHg/DBP up to 120 mmHg x 48 hours     • senna-docusate (PERICOLACE or SENOKOT S) 8.6-50 MG per tablet 2 Tab  2 Tab    And   • polyethylene glycol/lytes (MIRALAX) PACKET 1 Packet  1 Packet    And   • magnesium hydroxide (MILK OF MAGNESIA) suspension 30 mL  30 mL    And   • bisacodyl (DULCOLAX) suppository 10 mg  10 mg   • enoxaparin (LOVENOX) inj 40 mg  40 mg   • acetaminophen (Tylenol) tablet 650 mg  650 mg   • ondansetron (ZOFRAN) syringe/vial injection 4 mg  4 mg   • ondansetron (ZOFRAN ODT) dispertab 4 mg  4 mg   • promethazine (PHENERGAN) tablet 12.5-25 mg  12.5-25 mg   • promethazine (PHENERGAN) suppository 12.5-25 mg  12.5-25 mg   •  "prochlorperazine (COMPAZINE) injection 5-10 mg  5-10 mg   • LORazepam (ATIVAN) injection 4 mg  4 mg   • insulin regular (HumuLIN R,NovoLIN R) injection  1-6 Units    And   • glucose 4 g chewable tablet 16 g  16 g    And   • dextrose 50% (D50W) injection 50 mL  50 mL   • omeprazole (PRILOSEC) capsule 20 mg  20 mg       Medication Allergy:  Allergies   Allergen Reactions   • Geodon [Ziprasidone Hcl] Anaphylaxis     Anaphylaxis per patient   • Abilify      \"Feeling tired, like I don't even know whats going on around me\"   • Fish      Pt reports fish causes him to be sick to his stomach  Not listed on MAR noted 2/3/2021         Review of systems:   Constitutional: denies fever, night sweats, weight loss.   Eyes: denies acute vision change, eye pain or secretion.   Ears, Nose, Mouth, Throat: denies nasal secretion, nasal bleeding, difficulty swallowing, hearing loss, tinnitus, vertigo, ear pain, acute dental problems, oral ulcers or lesions.   Endocrine: denies recent weight changes, heat or cold intolerance, polyuria, polydypsia, polyphagia,abnormal hair growth.  Cardiovascular: denies new onset of chest pain, palpitations, syncope, or dyspnea of exertion.  Pulmonary: denies shortness of breath, new onset of cough, hemoptysis, wheezing, chest pain or flu-like symptoms.   GI: denies nausea, vomiting, diarrhea, GI bleeding, change in appetite, abdominal pain, and change in bowel habits.  : denies dysuria, urinary incontinence, hematuria.  Heme/oncology: denies history of easy bruising or bleeding. No history of cancer, DVTor PE.  Allergy/immunology: denies hives/urticaria, or itching.   Dermatologic: denies new rash, or new skin lesions.  Musculoskeletal:denies joint swelling or pain, muscle pain, neck and back pain.   Neurologic: As noted above.   Psychiatric: denies symptoms of depression, anxiety, hallucinations, mood swings or changes, suicidal or homicidal thoughts.     Physical examination:   Vitals:    02/03/21 " 2000 02/04/21 0000 02/04/21 0400 02/04/21 0757   BP: 123/58 102/58 114/65 103/55   Pulse: (!) 57 (!) 52 (!) 53 (!) 57   Resp: 16 16 16 16   Temp: 36.1 °C (97 °F) 36.6 °C (97.9 °F) 36.7 °C (98 °F) 36.1 °C (97 °F)   TempSrc: Temporal Temporal Temporal Temporal   SpO2: 96% 96% 95% 95%   Weight:         General: Patient in no acute distress, pleasant and cooperative.  HEENT: Normocephalic, no signs of acute trauma.   Neck: supple, no meningeal signs or carotid bruits. There is normal range of motion. No tenderness on exam.   Chest: clear to auscultation. No cough.   CV: RRR, no murmurs.   Skin: no signs of acute rashes or trauma.   Musculoskeletal: joints exhibit full range of motion, without any pain to palpation. There are no signs of joint or muscle swelling. There is no tenderness to deep palpation of muscles.   Psychiatric: No hallucinatory behavior. Denies symptoms of depression or suicidal ideation. Mood and affect appear normal on exam.     NEUROLOGICAL EXAM:   Mental status, orientation: Awake, alert and fully oriented.   Speech and language: speech is stuttered; non aphasic, non dysarthric. The patient is able to name, repeat and comprehend.   Cranial nerve exam: Pupils are 3-4 mm bilaterally and equally reactive to light. Visual fields are intact by confrontation on the Right; patient is blind in the Left eye (chronic/baseline). There is no nystagmus on primary or secondary gaze. Intact full EOM in all directions of gaze. Face appears symmetric. Sensation in the face is intact to light touch. Uvula is midline. Palate elevates symmetrically. Tongue is midline and without any signs of tongue biting or fasciculations Shoulder shrug is intact bilaterally.   Motor exam: Strength is 5/5 in LUE/LLE. 0/5 to RUE/RLE with positive Romero's. Tone is normal. No abnormal movements were seen on exam.   Sensory exam Decreased to light touch and nox stim to RUE/RLE. Otherwise normal sensation.   Deep tendon reflexes:   Plantar responses are flexor. There is no clonus.   Coordination: shows a normal finger-nose-finger on the left; patient does not participate on the Right..   Gait: Not assessed at this time as patient is a fall risk.     No changes to exam as was documented on 2/3/21.       NIH Stroke Scale    1a Level of Consciousness   1b Orientation Questions   1c Response to Commands   2 Gaze   3 Visual Fields   4 Facial Movement   5 Motor Function (arm)   a Left   b Right 3  6 Motor Function (leg)   a Left   b Right 3  7 Limb Ataxia   8 Sensory 1  9 Language   10 Articulation   11 Extinction/Inattention     Score: 7    ANCILLARY DATA REVIEWED:     Lab Data Review:  Recent Results (from the past 24 hour(s))   CBC WITH DIFFERENTIAL    Collection Time: 02/03/21 12:39 PM   Result Value Ref Range    WBC 8.1 4.8 - 10.8 K/uL    RBC 4.69 (L) 4.70 - 6.10 M/uL    Hemoglobin 13.3 (L) 14.0 - 18.0 g/dL    Hematocrit 42.3 42.0 - 52.0 %    MCV 90.2 81.4 - 97.8 fL    MCH 28.4 27.0 - 33.0 pg    MCHC 31.4 (L) 33.7 - 35.3 g/dL    RDW 46.8 35.9 - 50.0 fL    Platelet Count 274 164 - 446 K/uL    MPV 10.5 9.0 - 12.9 fL    Neutrophils-Polys 52.70 44.00 - 72.00 %    Lymphocytes 36.60 22.00 - 41.00 %    Monocytes 7.30 0.00 - 13.40 %    Eosinophils 1.40 0.00 - 6.90 %    Basophils 0.60 0.00 - 1.80 %    Immature Granulocytes 1.40 (H) 0.00 - 0.90 %    Nucleated RBC 0.00 /100 WBC    Neutrophils (Absolute) 4.28 1.82 - 7.42 K/uL    Lymphs (Absolute) 2.97 1.00 - 4.80 K/uL    Monos (Absolute) 0.59 0.00 - 0.85 K/uL    Eos (Absolute) 0.11 0.00 - 0.51 K/uL    Baso (Absolute) 0.05 0.00 - 0.12 K/uL    Immature Granulocytes (abs) 0.11 0.00 - 0.11 K/uL    NRBC (Absolute) 0.00 K/uL   COMP METABOLIC PANEL    Collection Time: 02/03/21 12:39 PM   Result Value Ref Range    Sodium 137 135 - 145 mmol/L    Potassium 3.8 3.6 - 5.5 mmol/L    Chloride 102 96 - 112 mmol/L    Co2 22 20 - 33 mmol/L    Anion Gap 13.0 7.0 - 16.0    Glucose 100 (H) 65 - 99 mg/dL    Bun 26 (H) 8 - 22  mg/dL    Creatinine 1.12 0.50 - 1.40 mg/dL    Calcium 9.3 8.5 - 10.5 mg/dL    AST(SGOT) 12 12 - 45 U/L    ALT(SGPT) 11 2 - 50 U/L    Alkaline Phosphatase 49 30 - 99 U/L    Total Bilirubin 0.3 0.1 - 1.5 mg/dL    Albumin 4.3 3.2 - 4.9 g/dL    Total Protein 6.8 6.0 - 8.2 g/dL    Globulin 2.5 1.9 - 3.5 g/dL    A-G Ratio 1.7 g/dL   PROTHROMBIN TIME    Collection Time: 21 12:39 PM   Result Value Ref Range    PT 13.1 12.0 - 14.6 sec    INR 0.96 0.87 - 1.13   APTT    Collection Time: 21 12:39 PM   Result Value Ref Range    APTT 29.5 24.7 - 36.0 sec   COD (ADULT)    Collection Time: 21 12:39 PM   Result Value Ref Range    ABO Grouping Only A     Rh Grouping Only POS     Antibody Screen-Cod NEG    TROPONIN    Collection Time: 21 12:39 PM   Result Value Ref Range    Troponin T 22 (H) 6 - 19 ng/L   ESTIMATED GFR    Collection Time: 21 12:39 PM   Result Value Ref Range    GFR If African American >60 >60 mL/min/1.73 m 2    GFR If Non African American >60 >60 mL/min/1.73 m 2   EKG (NOW)    Collection Time: 21 12:59 PM   Result Value Ref Range    Report       [facility] Emergency Dept.    Test Date:  2021  Pt Name:    HOLLY KIM                 Department: ER  MRN:        [id number]                      Room:        02  Gender:     Male                         Technician: [id number]  :        1980                   Requested By:RUCHI CAAL  Order #:    [id number]                    Reading MD: RUCHI CAAL MD    Measurements  Intervals                                Axis  Rate:       68                           P:          47  DE:         180                          QRS:        3  QRSD:       120                          T:          32  QT:         401  QTc:        427    Interpretive Statements  Sinus rhythm  Nonspecific intraventricular conduction delay  Baseline wander in lead(s) II,III,aVF,V2  Compared to ECG 2021 22:17:06  Intraventricular  conduction delay now present  ST (T wave) deviation no longer present  T-wave abnormality no longer present  Q waves no longer present  no acute ST chase nges  Electronically Signed On 2-3-2021 13:02:04 PST by RUCHI CAAL MD     VALPROIC ACID    Collection Time: 02/03/21  1:43 PM   Result Value Ref Range    Valproic Acid 66.1 50.0 - 100.0 ug/mL   SARS-CoV-2 PCR (24 hour In-House): Collect NP swab in VTM    Collection Time: 02/03/21  1:43 PM    Specimen: Respirate   Result Value Ref Range    SARS-CoV-2 Source NP Swab     SARS-CoV-2 by PCR NotDetected    URINALYSIS    Collection Time: 02/03/21  4:16 PM    Specimen: Urine   Result Value Ref Range    Color Yellow     Character Clear     Ph 5.0 5.0 - 8.0    Glucose >=1000 (A) Negative mg/dL    Ketones Trace (A) Negative mg/dL    Protein Negative Negative mg/dL    Bilirubin Negative Negative    Urobilinogen, Urine 1.0 Negative    Nitrite Negative Negative    Leukocyte Esterase Negative Negative    Occult Blood Negative Negative    Micro Urine Req see below    URINE DRUG SCREEN    Collection Time: 02/03/21  4:16 PM   Result Value Ref Range    Amphetamines Urine Negative Negative    Barbiturates Negative Negative    Benzodiazepines Negative Negative    Cocaine Metabolite Negative Negative    Methadone Negative Negative    Opiates Negative Negative    Oxycodone Negative Negative    Phencyclidine -Pcp Negative Negative    Propoxyphene Negative Negative    Cannabinoid Metab Negative Negative   REFRACTOMETER SG    Collection Time: 02/03/21  4:16 PM   Result Value Ref Range    Specific Gravity 1.045    DIAGNOSTIC ALCOHOL    Collection Time: 02/03/21  4:56 PM   Result Value Ref Range    Diagnostic Alcohol <10.1 0.0 - 10.0 mg/dL   FOLATE    Collection Time: 02/03/21  4:56 PM   Result Value Ref Range    Folate -Folic Acid 7.1 >4.0 ng/mL   Hemoglobin A1C    Collection Time: 02/03/21  4:56 PM   Result Value Ref Range    Glycohemoglobin 9.0 (H) 0.0 - 5.6 %    Est Avg Glucose 212  mg/dL   T.PALLIDUM AB EIA    Collection Time: 02/03/21  4:56 PM   Result Value Ref Range    Syphilis, Treponemal Qual Non-Reactive Non-Reactive   TSH    Collection Time: 02/03/21  4:56 PM   Result Value Ref Range    TSH 48.600 (H) 0.380 - 5.330 uIU/mL   VITAMIN B12    Collection Time: 02/03/21  4:56 PM   Result Value Ref Range    Vitamin B12 -True Cobalamin 367 211 - 911 pg/mL   ACCU-CHEK GLUCOSE    Collection Time: 02/03/21  5:20 PM   Result Value Ref Range    Glucose - Accu-Ck 71 65 - 99 mg/dL   ACCU-CHEK GLUCOSE    Collection Time: 02/03/21 10:54 PM   Result Value Ref Range    Glucose - Accu-Ck 67 65 - 99 mg/dL   ACCU-CHEK GLUCOSE    Collection Time: 02/03/21 11:19 PM   Result Value Ref Range    Glucose - Accu-Ck 150 (H) 65 - 99 mg/dL   ACCU-CHEK GLUCOSE    Collection Time: 02/04/21 12:39 AM   Result Value Ref Range    Glucose - Accu-Ck 90 65 - 99 mg/dL   ACCU-CHEK GLUCOSE    Collection Time: 02/04/21  2:13 AM   Result Value Ref Range    Glucose - Accu-Ck 78 65 - 99 mg/dL   CBC without Differential    Collection Time: 02/04/21  2:42 AM   Result Value Ref Range    WBC 8.5 4.8 - 10.8 K/uL    RBC 4.30 (L) 4.70 - 6.10 M/uL    Hemoglobin 12.6 (L) 14.0 - 18.0 g/dL    Hematocrit 39.3 (L) 42.0 - 52.0 %    MCV 91.4 81.4 - 97.8 fL    MCH 29.3 27.0 - 33.0 pg    MCHC 32.1 (L) 33.7 - 35.3 g/dL    RDW 47.0 35.9 - 50.0 fL    Platelet Count 253 164 - 446 K/uL    MPV 10.4 9.0 - 12.9 fL   Comp Metabolic Panel (CMP)    Collection Time: 02/04/21  2:42 AM   Result Value Ref Range    Sodium 133 (L) 135 - 145 mmol/L    Potassium 3.5 (L) 3.6 - 5.5 mmol/L    Chloride 100 96 - 112 mmol/L    Co2 23 20 - 33 mmol/L    Anion Gap 10.0 7.0 - 16.0    Glucose 180 (H) 65 - 99 mg/dL    Bun 24 (H) 8 - 22 mg/dL    Creatinine 1.02 0.50 - 1.40 mg/dL    Calcium 8.4 (L) 8.5 - 10.5 mg/dL    AST(SGOT) 10 (L) 12 - 45 U/L    ALT(SGPT) 8 2 - 50 U/L    Alkaline Phosphatase 44 30 - 99 U/L    Total Bilirubin 0.4 0.1 - 1.5 mg/dL    Albumin 3.9 3.2 - 4.9 g/dL     Total Protein 6.0 6.0 - 8.2 g/dL    Globulin 2.1 1.9 - 3.5 g/dL    A-G Ratio 1.9 g/dL   Lipid Profile    Collection Time: 02/04/21  2:42 AM   Result Value Ref Range    Cholesterol,Tot 180 100 - 199 mg/dL    Triglycerides 306 (H) 0 - 149 mg/dL    HDL 31 (A) >=40 mg/dL    LDL 88 <100 mg/dL   FREE THYROXINE    Collection Time: 02/04/21  2:42 AM   Result Value Ref Range    Free T-4 0.87 (L) 0.93 - 1.70 ng/dL   ESTIMATED GFR    Collection Time: 02/04/21  2:42 AM   Result Value Ref Range    GFR If African American >60 >60 mL/min/1.73 m 2    GFR If Non African American >60 >60 mL/min/1.73 m 2   TROPONIN    Collection Time: 02/04/21  2:42 AM   Result Value Ref Range    Troponin T 26 (H) 6 - 19 ng/L   ACCU-CHEK GLUCOSE    Collection Time: 02/04/21  3:04 AM   Result Value Ref Range    Glucose - Accu-Ck 142 (H) 65 - 99 mg/dL   ACCU-CHEK GLUCOSE    Collection Time: 02/04/21  5:04 AM   Result Value Ref Range    Glucose - Accu-Ck 95 65 - 99 mg/dL       Labs reviewed by me.     Records reviewed:   Reviewed records from patient's previous encounters at [facility].       Imaging reviewed by me:     DX-CHEST-PORTABLE (1 VIEW)   Final Result      No acute cardiopulmonary abnormality.      CT-CTA NECK WITH & W/O-POST PROCESSING   Final Result      1.  No high-grade stenosis, large vessel occlusion, aneurysm or dissection.   2.  A 3.4 cm heterogeneously enhancing mass in the neck, at the level of the thyroid cartilage. It may represent an ectopic thyroid tissue/nodule. It has grown slightly since 2015 study. Further evaluation with ultrasound is advised on an outpatient    basis.      CT-CTA HEAD WITH & W/O-POST PROCESS   Final Result      1.  CT angiogram of the [place] within normal limits.      2.  Abnormal low attenuation in the periventricular white matter again noted.      CT-HEAD W/O   Final Result      1.  No acute intracranial hemorrhage.      2.  Diffuse confluent periventricular hypodensity as on the prior MRI is  consistent with extensive chronic demyelination or dysmyelination as described previously.             Presumed mechanism by TOAST:  __Large Artery Atherosclerosis  __Small Vessel (Lacunar)  __Cardioembolic  __Other (Sickle Cell, Vasculitis, Hypercoagulable)  _X_Unknown    Modified Edmunds Scale (MRS): 1 = No significant disability, despite symptoms; able to perform all usual duties and activities      ASSESSMENT AND PLAN:  40-year old male, well known to [facility] (most recent hospitalization last week) with an extensive PMhx most significant for anxiety/deoression, Bipolar, developmental delay, seizure disorder (On VPA and Topiramate) Type II diabetes mellitus, and hypothyroid who again presented to [facility] On 2/3/21 for a chief complaint of sudden onset Right sided weakness/paresthesia; CT head with no acute intracranial abnormality; CTA head/neck with no LVO. Patient ultimately determined not to be a candidate for IV tPA secondary to low suspicion for acute ischemic stroke, higher suspicion for break through seizure versus functional/psychogenic etiology (patient uncooperative with exam, was then witnessed to spontaneously/voluntarily move RUE; also with stuttered speech-- a common functional exam finding). Exam unchanged today; stuttered speech appears to be distractible, further supporting non-organic nature of symptoms. MRI Brain is pending.     Recommendations/Plan:     -q4h and PRN neuro assessment. VS per nursing/unit protocol. BP goal < 140/90.   -Obtain MRI Brain w/wo contrast-- if MRI Brain is unremarkable, neurology will sign off.   -Check VPA, Topiramate levels; UA, UDS, ETOH, B12, Folate, TSH, RPR-- Note elevated TSH at 48.600, low T4. Will defer management to primary.   -Perform med rec given Polypharmacy.   -All other medical management per primary.     The plan of care above has been discussed with Dr. Mccoy.     Trinity Corado, A.P.R.N.  [facility]

## 2021-02-04 NOTE — THERAPY
"Occupational Therapy   Initial Evaluation     Patient Name: Norm Prabhakar  Age:  40 y.o., Sex:  male  Medical Record #: [id number]  Today's Date: 2/4/2021     Precautions  Precautions: Fall Risk  Comments: R side deficits, hx conversion disorder    Assessment  Patient is 40 y.o. male with a diagnosis of R sided weakness, seizure - workup ongoing. Hx of conversion disorder.  Additional factors influencing patient status / progress: weakness, fatigue, impaired balance, impaired cognition.  Throughout evaluation, pt with inconsistent demonstration of ability to perform functional tasks vs. w/ formal testing. See below for more detail    Plan    Recommend Occupational Therapy 4 times per week until therapy goals are met for the following treatments:  Adaptive Equipment, Cognitive Skill Development, Neuro Re-Education / Balance, Self Care/Activities of Daily Living, Therapeutic Activities and Therapeutic Exercises.    DC Equipment Recommendations: Unable to determine at this time  Discharge Recommendations: Recommend post-acute placement for additional occupational therapy services prior to discharge home     Subjective    \"I think you've worked with me in the past\"     Objective       02/04/21 0948   Prior Living Situation   Prior Services Intermittent Physical Support for ADL Per Service   Housing / Facility Group Home  ([facility])   Bathroom Set up Bathtub / Shower Combination   Equipment Owned None   Lives with - Patient's Self Care Capacity Unrelated Adult;Attendant / Paid Care Giver   Comments Pt has roommates. They all do their own ADLs/IADLs. The owner of the GH drives them to the store 1x/week.    Prior Level of ADL Function   Self Feeding Independent   Grooming / Hygiene Independent   Bathing Independent   Dressing Independent   Toileting Independent   Prior Level of IADL Function   Medication Management Independent   Laundry Independent   Kitchen Mobility Independent   Finances Independent   Home Management " Independent   Shopping Requires Assist   Prior Level Of Mobility Independent Without Device in Community;Independent Without Device in Home   Driving / Transportation Relatives / Others Provide Transportation   Occupation (Pre-Hospital Vocational) Not Employed   Cognition    Cognition / Consciousness X   Speech/ Communication Delayed Responses   Level of Consciousness Alert   Initiation Impaired   Comments pleasant and cooperative, stuttering speech throughout. inconsistent demonstration of ability to perform functional tasks vs. w/ formal testing   Active ROM Upper Body   Active ROM Upper Body  X   Dominant Hand Right   Comments w/ formal testing, pt initially presenting as flaccid however then started to participate and achieve full ROM w/ very light active assist. Also observed full  on walker and moving arm out to side to put blood pressure cuff on.   Strength Upper Body   Comments see above   Coordination Upper Body   Comments see above; RUE formal testing reveals absent coordinaton but pt able to use it intermittently during evaluation   Balance Assessment   Sitting Balance (Static) Fair +   Sitting Balance (Dynamic) Fair   Standing Balance (Static) Fair -   Standing Balance (Dynamic) Poor +   Weight Shift Sitting Fair   Weight Shift Standing Poor   Comments w/ FWW, able to maintain  on FWW   Bed Mobility    Supine to Sit Minimal Assist   Scooting Supervised   ADL Assessment   Eating Supervision  (drink juice)   Grooming   (declined)   Lower Body Dressing Moderate Assist  (don shorts)   Functional Mobility   Sit to Stand Minimal Assist   Bed, Chair, Wheelchair Transfer Minimal Assist   Transfer Method Stand Pivot   Mobility EOB>Chair   Comments w/ FWW   Visual Perception   Visual Perception  X   Comments wears corrective lenses, blind in L eye   Patient / Family Goals   Patient / Family Goal #1 to go home   Short Term Goals   Short Term Goal # 1 pt will demo toilet txf with supv   Short Term Goal # 2  pt will dress LB with supv   Short Term Goal # 3 pt will dress UB with supv   Short Term Goal # 4 pt will groom in stance w/ supv

## 2021-02-04 NOTE — THERAPY
Physical Therapy   Initial Evaluation     Patient Name: Norm Prabhakar  Age:  40 y.o., Sex:  male  Medical Record #: [id number]  Today's Date: 2/4/2021     Precautions: Fall Risk    Assessment  Patient is 40 y.o. male admitted with sudden onset right sided weakness/paresthesia. PMhx most significant for anxiety/deoression, Bipolar, developmental delay, seizure disorder, DM II, and hypothyroid. Patient lives in group home, and receives assistance for IADLs, otherwise IND with functional mobility and ADLs. Patient presents to physical therapy evaluation with RLE/UE weakness, increased RLE tone, impaired standing balance, impaired coordination/motor planning, decreased activity tolerance and fall risk. Patient will benefit from continued skilled IP PT to address impairments and will benefit from intensive multidisciplinary therapy to optimize functional independence and decrease fall risk     Plan    Recommend Physical Therapy 4 times per week until therapy goals are met for the following treatments:  Bed Mobility, Gait Training, Stair Training and Therapeutic Activities    DC Equipment Recommendations: (P) Unable to determine at this time  Discharge Recommendations: (P) Recommend post-acute placement for additional physical therapy services prior to discharge home        Objective       02/04/21 0945   Prior Living Situation   Prior Services Intermittent Physical Support for ADL Per Service;Other (Comments)   Housing / Facility Group Home  (well care)   Steps Into Home   (2 FOS)   Bathroom Set up Bathtub / Shower Combination   Equipment Owned None   Lives with - Patient's Self Care Capacity Unrelated Adult;Attendant / Paid Care Giver   Comments has roommates, not available to assist physically- staff at group home does grocery shopping x1/wk    Prior Level of Functional Mobility   Bed Mobility Independent   Transfer Status Independent   Ambulation Independent   Distance Ambulation (Feet)   (commuity amb)   Assistive  Devices Used None   Stairs Independent   Gait Analysis   Gait Level Of Assist Unable to Participate   Bed Mobility    Supine to Sit Moderate Assist   Scooting Supervised   Rolling Supervised   Functional Mobility   Sit to Stand Minimal Assist   Bed, Chair, Wheelchair Transfer Minimal Assist   Transfer Method Stand Pivot   Mobility w/ FWW   Comments unequal wbing through LLE; required faciliation at RLE during transfer    Patient / Family Goals    Patient / Family Goal #1 to go to rehab   Short Term Goals    Short Term Goal # 1 Patient will be able to demonstrate bed mobility SPV in 6 visits in order to incrs fxnl ind   Short Term Goal # 2 Patient will be able to perform transfers SPV in 6 visits in order to incrs fxnl ind   Short Term Goal # 3 Patient will be able to perform amb x200' SPV in 6 visits in order to incrs fxnl ind   Short Term Goal # 4 Patient will be able to negotiate x10 stairs with SPV in 6 visits in order to incrs fxnal ind   Anticipated Discharge Equipment and Recommendations   DC Equipment Recommendations Unable to determine at this time   Discharge Recommendations Recommend post-acute placement for additional physical therapy services prior to discharge home

## 2021-02-04 NOTE — NON-PROVIDER
"CC: \"I woke up and couldn't feel the Right side of my body\"    History of Presenting Illness  40 y.o. male with a past medical history of DM, HLD, migraines, multiple concussions, and seizure disorder presented to the ED after a 1 week history of tingling sensation throughout the right side of his body and persistent headache described as \"an elephant on my head\", and chalky taste in his mouth.     Pt went to CT at which point they ruled out hemmorhagic stroke, pt is currently being worked up for ischemic stroke.      Interval Update:  2/4 - Pt reports he is doing well, other than a headache described as 8/10 intensity, dull throbbing sensation, without radiation, worse with light and sound, better with darkness and silence. Pt also endorses some mild epigastric pain. Speech is slurred and pt periodically stutters. Pt reports no difficulty swallowing and normal bowel and bladder sensation and function without diarrhea or burning. NIH scale of 13    Review of Systems  Review of Systems   Constitutional: Negative for chills, fever and weight loss.   HENT: Positive for tinnitus. Negative for ear discharge, ear pain, nosebleeds and sinus pain.    Eyes: Positive for photophobia. Negative for blurred vision.   Respiratory: Negative for stridor.    Cardiovascular: Negative for chest pain.   Gastrointestinal: Positive for abdominal pain.   Genitourinary: Negative for dysuria, flank pain, frequency and urgency.   Musculoskeletal: Negative for back pain, joint pain, myalgias and neck pain.   Neurological: Positive for tingling, speech change, seizures and headaches. Negative for dizziness.   Psychiatric/Behavioral: Positive for substance abuse.       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 eye "   • Hypothyroidism    • Indigestion     once in a while   • Mental disorder     learning disabilities; speech impairment; developmental delays   • Murmur     since birth   • Pneumonia     remote   • Psychiatric problem     PTSD   • S/P thyroidectomy    • Seizure (HCC)    • Seizure disorder (HCC)    • Unspecified disorder of thyroid        Surgical History  Past Surgical History:   Procedure Laterality Date   • EYE SURGERY     • OTHER      Hernia Repair when he was 10 yrs old   • THYROID LOBECTOMY         Family History  Family History   Problem Relation Age of Onset   • Hypertension Mother    • Heart Disease Mother    • Lung Disease Mother    • Stroke Maternal Grandmother    Headache history in mother, and aunt    Social History  Social History     Socioeconomic History   • Marital status: Single     Spouse name: Not on file   • Number of children: Not on file   • Years of education: Not on file   • Highest education level: Not on file   Occupational History   • Not on file   Social Needs   • Financial resource strain: Somewhat hard   • Food insecurity     Worry: Sometimes true     Inability: Sometimes true   • Transportation needs     Medical: No     Non-medical: No   Tobacco Use   • Smoking status: Current Every Day Smoker     Packs/day: 0.25     Types: Cigarettes, Cigars     Last attempt to quit: 2020     Years since quittin.6   • Smokeless tobacco: Never Used   • Tobacco comment: 3 cigarettes a day   Substance and Sexual Activity   • Alcohol use: Not Currently     Frequency: 2-3 times a week   • Drug use: Yes     Types: Inhaled     Comment: marijuana   • Sexual activity: Not on file   Lifestyle   • Physical activity     Days per week: Not on file     Minutes per session: Not on file   • Stress: Not on file   Relationships   • Social connections     Talks on phone: Not on file     Gets together: Not on file     Attends Baptism service: Not on file     Active member of club or organization: Not on  file     Attends meetings of clubs or organizations: Not on file     Relationship status: Not on file   • Intimate partner violence     Fear of current or ex partner: Not on file     Emotionally abused: Not on file     Physically abused: Not on file     Forced sexual activity: Not on file   Other Topics Concern   • Not on file   Social History Narrative   • Not on file       Medications  Current Facility-Administered Medications   Medication Dose Route Frequency Provider Last Rate Last Admin   • 5% dextrose infusion   Intravenous Continuous Phill Harrison D.O. 100 mL/hr at 02/04/21 0242 New Bag at 02/04/21 0242   • aspirin EC (ECOTRIN) tablet 81 mg  81 mg Oral QAM Brandan Pacheco M.D.   81 mg at 02/04/21 0919   • atorvastatin (LIPITOR) tablet 80 mg  80 mg Oral Q EVENING Brandan Pacheco M.D.   Stopped at 02/03/21 1800   • budesonide-formoterol (SYMBICORT) 160-4.5 MCG/ACT inhaler 2 Puff  2 Puff Inhalation BID Brandan Pacheco M.D.   2 Puff at 02/04/21 0503   • busPIRone (BUSPAR) tablet 10 mg  10 mg Oral TID Brandan Pacheco M.D.   10 mg at 02/04/21 1253   • famotidine (PEPCID) tablet 10 mg  10 mg Oral BID Brandan Pacheco M.D.   10 mg at 02/04/21 0918   • fenofibrate micronized (LOFIBRA) capsule 134 mg  134 mg Oral Q EVENING Brandan Pacheco M.D.   Stopped at 02/03/21 1800   • levothyroxine (SYNTHROID) tablet 225 mcg  225 mcg Oral AM ES Brandan Pacheco M.D.   225 mcg at 02/04/21 0900   • sertraline (Zoloft) tablet 100 mg  100 mg Oral DAILY Brandan Pacheco M.D.   100 mg at 02/04/21 1031   • traZODone (DESYREL) tablet 100 mg  100 mg Oral HS PRN Brandan Pacheco M.D.       • Pharmacy consult request - Allow for permissive hypertension: SBP up to 220 mmHg/DBP up to 120 mmHg x 48 hours   Other PHARMACY TO DOSE Brandan Pacheco M.D.       • senna-docusate (PERICOLACE or SENOKOT S) 8.6-50 MG per tablet 2 Tab  2 Tab Oral BID Brandan Pacheco M.D.   Stopped at 02/03/21 1800    And   • polyethylene glycol/lytes (MIRALAX) PACKET 1 Packet  1 Packet Oral QDAY PRN Brandan Pacheco,  "M.D.        And   • magnesium hydroxide (MILK OF MAGNESIA) suspension 30 mL  30 mL Oral QDAY PRN Brandan Pacheco M.D.        And   • bisacodyl (DULCOLAX) suppository 10 mg  10 mg Rectal QDAY PRN Brandan Pacheco M.D.       • enoxaparin (LOVENOX) inj 40 mg  40 mg Subcutaneous Q EVENING Brandan Pacheco M.D.   40 mg at 02/03/21 1800   • acetaminophen (Tylenol) tablet 650 mg  650 mg Oral Q6HRS PRN Brandan Pacheco M.D.       • ondansetron (ZOFRAN) syringe/vial injection 4 mg  4 mg Intravenous Q4HRS PRN Brandan Pacheco M.D.       • ondansetron (ZOFRAN ODT) dispertab 4 mg  4 mg Oral Q4HRS PRN Brandan Pacheco M.D.       • promethazine (PHENERGAN) tablet 12.5-25 mg  12.5-25 mg Oral Q4HRS PRN Brandan Pacheco M.D.       • promethazine (PHENERGAN) suppository 12.5-25 mg  12.5-25 mg Rectal Q4HRS PRN Brandan Pacheco M.D.       • prochlorperazine (COMPAZINE) injection 5-10 mg  5-10 mg Intravenous Q4HRS PRN Brandan Pacheco M.D.       • LORazepam (ATIVAN) injection 4 mg  4 mg Intravenous Q10 MIN PRN Brandan Pacheco M.D.       • insulin regular (HumuLIN R,NovoLIN R) injection  1-6 Units Subcutaneous Q6HRS Brandan Pacheco M.D.   Stopped at 02/03/21 1800    And   • glucose 4 g chewable tablet 16 g  16 g Oral Q15 MIN PRN Brandan Pacheco M.D.        And   • dextrose 50% (D50W) injection 50 mL  50 mL Intravenous Q15 MIN PRN Brandan Pacheco M.D.   50 mL at 02/04/21 0226   • omeprazole (PRILOSEC) capsule 20 mg  20 mg Oral BID Brandan Pacheco M.D.   20 mg at 02/04/21 0917       Allergies  Allergies   Allergen Reactions   • Geodon [Ziprasidone Hcl] Anaphylaxis     Anaphylaxis per patient   • Abilify      \"Feeling tired, like I don't even know whats going on around me\"   • Fish      Pt reports fish causes him to be sick to his stomach  Not listed on MAR noted 2/3/2021         Physical Exam  Temp:  [36.1 °C (97 °F)-36.7 °C (98 °F)] 36.1 °C (97 °F)  Pulse:  [52-60] 57  Resp:  [16-17] 16  BP: (102-123)/(55-74) 103/55  SpO2:  [95 %-96 %] 95 %    Physical Exam   Constitutional: He is oriented to person, " place, and time. He appears well-developed and well-nourished. No distress.   HENT:   Head: Normocephalic and atraumatic.   Right Ear: External ear normal.   Left Ear: External ear normal.   Nose: Nose normal.   Mouth/Throat: Oropharynx is clear and moist.   Eyes: Right eye exhibits no discharge. Left eye exhibits no discharge. Right conjunctiva is not injected. Left conjunctiva is not injected. No scleral icterus. Right eye exhibits nystagmus. Right pupil is round. Left pupil is not reactive. Left pupil is round.   Neck: Normal range of motion. No JVD present. Carotid bruit is not present. No tracheal deviation present. Thyroid mass present.       Cardiovascular: Normal rate, regular rhythm, normal heart sounds and intact distal pulses. Exam reveals no gallop and no friction rub.   No murmur heard.  Pulses:       Carotid pulses are 2+ on the right side and 2+ on the left side.       Radial pulses are 2+ on the right side and 2+ on the left side.        Femoral pulses are 2+ on the right side and 2+ on the left side.       Popliteal pulses are 2+ on the right side and 2+ on the left side.        Dorsalis pedis pulses are 2+ on the right side and 2+ on the left side.        Posterior tibial pulses are 2+ on the right side and 2+ on the left side.   Pulmonary/Chest: Effort normal and breath sounds normal. No stridor. No respiratory distress. He has no wheezes. He has no rales. He exhibits no tenderness.   Abdominal: Soft. Bowel sounds are normal. He exhibits no shifting dullness, no distension, no fluid wave, no ascites, no pulsatile midline mass and no mass. There is no hepatosplenomegaly. There is abdominal tenderness (abdominal tenderness in midepigastrum) in the epigastric area. There is no rigidity, no rebound, no guarding, no CVA tenderness and no tenderness at McBurney's point.   Musculoskeletal:         General: No tenderness, deformity or edema.        Arms:       Comments: Complete right sided loss of  sensation.    Lymphadenopathy:     He has no cervical adenopathy.   Neurological: He is alert and oriented to person, place, and time. A cranial nerve deficit is present. He exhibits abnormal muscle tone.   Reflex Scores:       Bicep reflexes are 2+ on the right side and 2+ on the left side.       Patellar reflexes are 0 on the right side and 2+ on the left side.       Achilles reflexes are 0 on the right side and 2+ on the left side.  CN 1,2, 3, 4,6 grossly intact. Left CN non reactive. Pt has R 5, 7, 10, and 11 deficit. CN 8 grossly intact.    Skin: Skin is warm. No rash noted. No erythema.   Psychiatric: His speech is delayed and slurred.       Fluids  Date 02/04/21 0700 - 02/05/21 0659   Shift 1623-6130 9795-2089 1091-8845 24 Hour Total   INTAKE   P.O. 200   200   Shift Total 200   200   OUTPUT   Shift Total       Weight (kg) 135.1 135.1 135.1 135.1       Laboratory  Labs reviewed, pertinent labs below.  Recent Labs     02/03/21  1239 02/04/21  0242   WBC 8.1 8.5   RBC 4.69* 4.30*   HEMOGLOBIN 13.3* 12.6*   HEMATOCRIT 42.3 39.3*   MCV 90.2 91.4   MCH 28.4 29.3   MCHC 31.4* 32.1*   RDW 46.8 47.0   PLATELETCT 274 253   MPV 10.5 10.4     Recent Labs     02/03/21  1239 02/04/21  0242   SODIUM 137 133*   POTASSIUM 3.8 3.5*   CHLORIDE 102 100   CO2 22 23   GLUCOSE 100* 180*   BUN 26* 24*   CREATININE 1.12 1.02   CALCIUM 9.3 8.4*     Recent Labs     02/03/21  1239   APTT 29.5   INR 0.96            URINALYSIS:  Lab Results   Component Value Date/Time    COLORURINE Yellow 02/03/2021 1616    CLARITY Clear 02/03/2021 1616    SPECGRAVITY 1.045 02/03/2021 1616    PHURINE 5.0 02/03/2021 1616    KETONES Trace (A) 02/03/2021 1616    PROTEINURIN Negative 02/03/2021 1616    BILIRUBINUR Negative 02/03/2021 1616    UROBILU 1.0 02/03/2021 1616    NITRITE Negative 02/03/2021 1616    LEUKESTERAS Negative 02/03/2021 1616    OCCULTBLOOD Negative 02/03/2021 1616     UPC  No results found for: TOTPROTUR No results found for:  CREATININEU    Imaging reviewed  DX-CHEST-PORTABLE (1 VIEW)   Final Result      No acute cardiopulmonary abnormality.      CT-CTA NECK WITH & W/O-POST PROCESSING   Final Result      1.  No high-grade stenosis, large vessel occlusion, aneurysm or dissection.   2.  A 3.4 cm heterogeneously enhancing mass in the neck, at the level of the thyroid cartilage. It may represent an ectopic thyroid tissue/nodule. It has grown slightly since 2015 study. Further evaluation with ultrasound is advised on an outpatient    basis.      CT-CTA HEAD WITH & W/O-POST PROCESS   Final Result      1.  CT angiogram of the [place] within normal limits.      2.  Abnormal low attenuation in the periventricular white matter again noted.      CT-HEAD W/O   Final Result      1.  No acute intracranial hemorrhage.      2.  Diffuse confluent periventricular hypodensity as on the prior MRI is consistent with extensive chronic demyelination or dysmyelination as described previously.               Assessment/Plan  Right sided weakness   Vascular -  Ischemic stroke possible, given patients CT finding of no acute intracerebral hemorraghic stroke ischemia is most likely alternative.     Plan - ischemic stroke workup, CT w/o and w/ contrast revealed no acute hemorrhage or high-grade stenosis, or large vessel occlusion, aneurysm or dissection. Rule out DVT with doppler study of lower extremities and echocardiogram with bubble study for patent foramen ovale. EKG for a-fib.  Allow for permissive hypertension and hold all antihypertensives at this time.      Infectious - Pt, is diabetic which puts him at higher risk of infection. Cannot rule out infectious insult as a result of patient neurological deficits. Meningitis and encephalitis can present with headache and focal neurological deficits.    Plan - LP with CSF analysis and gram stain     Neoplastic - Neoplastic origin of neurological deficit remains a possibility, however is somewhat more  unlikely given patients negative CT scan    Plan - MRI     Degenerative - Pt has history of recurrent trauma and head injury. Neurological defects are associated with worsening and degenerative changes of lipohylinosis or atheroma formation from vascular insults.    Plan - continue statin therapy and antiplatelet therapy of Aspirin 81.      Iatrogenic/Intoxication - Pt has hx of recreational marijuana use, and while patient denies any other recreational drug use, acute hypoxemic brain injury is a possibility    Plan - Urine toxicology     Congenital - Pt has a family hx of migraines in his family on his mothers side. While the patient does not endorse any family hx of stroke, there is a possibility of AV malformations that could be present and causing vascular steal.     Plan - MR A for abnormal flow     Autoimmune -  Pt has white matter effacement on most recent imaging, and may represent progression of multiple sclerosis    Plan - CSF analysis for oligoclonal bands and MRI for white matter effacement.      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 Neurological deficits can be present as progression of subsequent brain injury.     Plan - MRI     Endocrine/Metabolic - Pt has history of DM, given patients history of DM, DKA or severe hypoglycemia is a possibility.    Plan A1c, BMP      Abdominal Discomfort   Vascular - Pt reports mid epigastric pain, can be concerning for mesenteric ischemia.     Plan - ABD CT A     Infectious - may represent infection of H. Pylori     Plan - Urease breath test and fecal antigen for H. Pylori     Neoplastic - may be stomach neoplastic growth    Plan - Endoscopic exam per GI      Degenerative - pt is obese, and may have thoracic nerve compression manifesting as abdominal pain.    Plan - MRI spine     Inflammatory/Autoimmune - Gastroesophageal reflux disease, pt is  overweight, which can be associated with GERD and the associated reflux to cause abdominal pain    Plan - PPI therapy     Congenital - Pt may have      Autoimmune -

## 2021-02-04 NOTE — PROGRESS NOTES
Report received from Richy in the ED. Pt arrived to unit at 1645 on zoll monitor. no family ABS. Pt oriented to room and unit. All questions and immediate concerns addressed. NIHSS of 12 upon arrival. Tele monitor order received, pt placed on tele box, monitor room notified and confirmed. Dysphagia screening failed due to facial droop, SLP to see in AM, MD notified, pt  NPO. SCD on. Permissive parameters of 220/120 in place. 2 RN skin check performed, waffle mattress placed, and Q2 turns in place. Fall, seizure, and aspiration precautions in place with Q4 hour neuro checks and hourly rounding in place.

## 2021-02-04 NOTE — THERAPY
Speech Language Pathology   Initial Assessment     Patient Name: Norm Prabhakar  AGE:  40 y.o., SEX:  male  Medical Record #: [id number]  Today's Date: 2/4/2021     Precautions  Precautions: Fall Risk  Comments: R side deficits, hx conversion disorder    Assessment    Pt is a 40 yr old admitted 2/3/21 with R-sided weakness suspected s/s related to seizure.  PMH: seizure disorder, psychiatric disorder, essential HTN, DM.  Pt with IV ativan for breathrough siezures.  CT 2/3; negative for occlusion, aneurysm or dissectrion.  3.4 enhancing mass in the neck, at the level of the thyroid cartilege.  MRI pending. Chest 2/3; no acute process.   Pt familiar to this SLP from admit in October of 2020.  Pt with inconsistent speech dysfluency and is an unreliable historian regarding personal information.  Currently pt is AAOx4, with underbite noted.  Pt able to demonstrate grossly fxnl labial and lingual movements and suspect pt could do better with diadokokinetics than he did, with improved effort.  Pt tolerated small amounts of mildly thick, thin liquids, two purees, moist/minced/soft and bite sized and dry solids without s/s of aspiration but without movement of RUE (dominant hand) for self feeding.  Pt did utilize RUE for manipulating foods intermittently.  Slightly modified diet due to inability to utilize bilateral UE for cutting foods, opening containers, etc.      Plan    Recommend Speech Therapy 3 times per week until therapy goals are met for the following treatments:  Dysphagia Training and Patient / Family / Caregiver Education.    Discharge Recommendations: (P) Anticipate that the patient will have no further speech therapy needs after discharge from the hospital  (for dysphagia)     Objective       02/04/21 0900   Prior Level Of Function   Comments has worked in [employer] recently.  Pt is an inaccurate historian, has a psych hx   Oral Motor Eval    Is Patient Able to Complete Oral Motor Eval Yes but Impaired   Labial  Function   Labial Vowel Production / I /, / U / Minimal   Labial Sequence / I /, / U / Minimal   Lingual Function   Lingual Structure At Rest Minimal  (suspect pt could do better, but his effort is poor)   Lingual Protrude Minimal   Lick Lips (Circular) Moderate   / Ta / 5X's Minimal  (suspect pt could do better )   / Ka / 5X's Minimal   / [place] / 5X's Minimal   Oral Food Presentation   Soft & Bite-Sized (6) - (Dysphagia III) Minimal   Regular-Easy to Chew (7) Minimal   Self Feeding Needs Assistance   Dysphagia Strategies / Recommendations   Strategies / Interventions Recommended (Yes / No) Yes   Compensatory Strategies Assistance Needed for Meal Tray Set-up;Monitor During Meals;Head of Bed 90 Degrees During Eating / Drinking;Liquids Via Cup;Reduce Bolus Size to 1 Teaspoon;Single Sips / Bites;Alternate Solids / Liquids   Diet / Liquid Recommendation Soft & Bite-Sized (6) - (Dysphagia III)   Medication Administration  Float Whole with Puree;Whole with Liquid Wash  (pt preference)   Therapy Interventions Dysphagia Therapy By Speech Language Pathologist   Dysphagia Rating   Nutritional Liquid Intake Rating Scale Non thickened beverages   Nutritional Food Intake Rating Scale Total oral diet with multiple consistencies without special preparation but with specific food limitations   Patient / Family Goals   Patient / Family Goal #1 A soda   Goal #1 Outcome Progressing as expected   Short Term Goals   Short Term Goal # 1 Pt will consume a SB6/TNO diet without s/s of dysphagia with assisted tray set-up and monitor during meals.   Problem List   Problem List Dysphagia   Interdisciplinary Plan of Care Collaboration   IDT Collaboration with  Nursing   Patient Position at End of Therapy Seated;In Bed;Call Light within Reach;Tray Table within Reach;Phone within Reach   Collaboration Comments SB6/TNO with assisted set-up and monitor during meals until pt feeds himself.     Session Information   Date / Session Number 1, 2/4/21  (1/3-2/10  (Has cog orders; eval done 10/2020. Group home/psych)   Priority 2  (3x. R weak/szdisorder/psych. BSE; SB6/TNO. dysfluent hx)

## 2021-02-04 NOTE — CONSULTS
Physical Medicine and Rehabilitation Consultation              Date of initial consultation: 2/4/2021  Consulting provider: Brandan Pacheco MD  Reason for consultation: assess for acute inpatient rehab appropriateness  LOS: 0 Day(s)    Chief complaint: Possible Santosh's paralysis    HPI: The patient is a 40 y.o. right hand dominant male with a past medical history of anxiety, depression, bipolar disorder, developmental delay, seizure disorder, diabetes type 2, hypothyroidism;  who presented on 2/3/2021 12:34 PM with sudden onset right-sided weakness/ paresthesias.  Weakness affected both right upper and lower extremity.  Patient was seen by neurology who found NIH score of 7, but low suspicion of ischemic event, therefore did not receive TPA and is awaiting MRI.  CT head normal.  EEG yesterday reported abundant findings suggestive of a very high risk for seizures arising from multiple areas in the brain.    The patient currently reports complete flaccid paralysis of the right upper and lower extremity.  Patient confirms he is living in a group home but states he has a fiancée in [place] and is trying to figure out how to get out there.  He endorses headache otherwise negative ROS    Social Hx:  Patient lives in a group home with 2 flights of stairs to enter.  Patient independent at the home.    THERAPY:  PT: Functional mobility   2/4: Min assist    OT: ADLs  2/4: Mod assist lower body dressing    SLP:   2/4: Regular diet    IMAGING:  CT head 2/3/2020  1.  No acute intracranial hemorrhage.     2.  Diffuse confluent periventricular hypodensity as on the prior MRI is consistent with extensive chronic demyelination or dysmyelination as described previously.    PROCEDURES:  Robbin Milian MD EEG  Abnormal video EEG recording in the awake and drowsy state(s):  - Abundant independent multifocal spikes/polyspikes seen in the left greater than right temporal lobes, occipital lobes, or parietal lobes, often periodic or rhythmic  at 1.5 to 3 Hz for 2 to 8 seconds. In addition, there is frequent bitemporal rhythmic delta slowing at 2 to 3 Hz, often with embedded spikes, occurring both synchronously as well as asynchronously.  The combination of the above findings are suggestive of a very high risk for seizures arising from multiple areas in the brain.  -Occasional right frontal slowing suggestive of focal dysfunction in this region.  Clinical and radiographic correlation recommended.  -No definitive electroclinical seizures.  However, patient is at high risk for seizures.  Patient is not back to his neurological baseline with consider more prolonged monitoring to rule out clinical and subclinical seizures.    PMH:  Past Medical History:   Diagnosis Date   • Anxiety     BIPOLAR   • ASTHMA    • Bipolar 1 disorder ([facility])    • Depression    • Diabetes ([facility])     Type II Diabetes   • Fall     passed out 2 wks ago   • Glaucoma    • Glaucoma 1982    both eyes/ blind on left eye   • Hypothyroidism    • Indigestion     once in a while   • Mental disorder     learning disabilities; speech impairment; developmental delays   • Murmur     since birth   • Pneumonia     remote   • Psychiatric problem 2002    PTSD   • S/P thyroidectomy    • Seizure ([facility]) 2010   • Seizure disorder ([facility])    • Unspecified disorder of thyroid        PSH:  Past Surgical History:   Procedure Laterality Date   • EYE SURGERY     • OTHER      Hernia Repair when he was 10 yrs old   • THYROID LOBECTOMY         FHX:  Family History   Problem Relation Age of Onset   • Hypertension Mother    • Heart Disease Mother    • Lung Disease Mother    • Stroke Maternal Grandmother        Medications:  Current Facility-Administered Medications   Medication Dose   • 5% dextrose infusion     • aspirin EC (ECOTRIN) tablet 81 mg  81 mg   • atorvastatin (LIPITOR) tablet 80 mg  80 mg   • budesonide-formoterol (SYMBICORT) 160-4.5 MCG/ACT inhaler 2 Puff  2 Puff   • busPIRone (BUSPAR) tablet 10 mg  10 mg   •  "famotidine (PEPCID) tablet 10 mg  10 mg   • fenofibrate micronized (LOFIBRA) capsule 134 mg  134 mg   • levothyroxine (SYNTHROID) tablet 225 mcg  225 mcg   • sertraline (Zoloft) tablet 100 mg  100 mg   • traZODone (DESYREL) tablet 100 mg  100 mg   • Pharmacy consult request - Allow for permissive hypertension: SBP up to 220 mmHg/DBP up to 120 mmHg x 48 hours     • senna-docusate (PERICOLACE or SENOKOT S) 8.6-50 MG per tablet 2 Tab  2 Tab    And   • polyethylene glycol/lytes (MIRALAX) PACKET 1 Packet  1 Packet    And   • magnesium hydroxide (MILK OF MAGNESIA) suspension 30 mL  30 mL    And   • bisacodyl (DULCOLAX) suppository 10 mg  10 mg   • enoxaparin (LOVENOX) inj 40 mg  40 mg   • acetaminophen (Tylenol) tablet 650 mg  650 mg   • ondansetron (ZOFRAN) syringe/vial injection 4 mg  4 mg   • ondansetron (ZOFRAN ODT) dispertab 4 mg  4 mg   • promethazine (PHENERGAN) tablet 12.5-25 mg  12.5-25 mg   • promethazine (PHENERGAN) suppository 12.5-25 mg  12.5-25 mg   • prochlorperazine (COMPAZINE) injection 5-10 mg  5-10 mg   • LORazepam (ATIVAN) injection 4 mg  4 mg   • insulin regular (HumuLIN R,NovoLIN R) injection  1-6 Units    And   • glucose 4 g chewable tablet 16 g  16 g    And   • dextrose 50% (D50W) injection 50 mL  50 mL   • omeprazole (PRILOSEC) capsule 20 mg  20 mg       Allergies:  Allergies   Allergen Reactions   • Geodon [Ziprasidone Hcl] Anaphylaxis     Anaphylaxis per patient   • Abilify      \"Feeling tired, like I don't even know whats going on around me\"   • Fish      Pt reports fish causes him to be sick to his stomach  Not listed on MAR noted 2/3/2021         Physical Exam:  Vitals: /55   Pulse (!) 57   Temp 36.1 °C (97 °F) (Temporal)   Resp 16   Wt (!) 135 kg (297 lb 13.5 oz)   SpO2 95%   Gen: NAD  Head: NC/AT  Eyes/ Nose/ Mouth: PERRLA, moist mucous membranes  Cardio: RRR, good distal perfusion, warm extremities  Pulm: normal respiratory effort, no cyanosis   Abd: Soft NTND, negative " borborygmi   Ext: No peripheral edema. No calf tenderness. No clubbing.    Mental status: answers questions appropriately follows commands  Speech: fluent, no aphasia or dysarthria    CRANIAL NERVES:  2,3: visual acuity grossly intact, PERRL  3,4,6: EOMI bilaterally, no nystagmus or diplopia  5: sensation intact to light touch bilaterally and symmetric  7: no facial asymmetry  8: hearing grossly intact  9,10: symmetric palate elevation  11: SCM/Trapezius strength 5/5 bilaterally  12: tongue protrudes midline    Motor:      Upper Extremity  Myotome R L   Shoulder flexion C5 0/5 5   Elbow flexion C5 0/5 5   Wrist extension C6 0/5 5   Elbow extension C7 0/5 5   Finger flexion C8 0/5 5   Finger abduction T1 0/5 5     Lower Extremity Myotome R L   Hip flexion L2 0/5 5   Knee extension L3 0/5 5   Ankle dorsiflexion L4 0/5 5   Toe extension L5 0/5 5   Ankle plantarflexion S1 0/5 5     Sensory:   intact to light touch through out    DTRs:  Right  Left    Brachioradialis  2+  2+   Patella tendon  2+ 2+     Labs: Reviewed and significant for   Recent Labs     21  1239 21  0242   RBC 4.69* 4.30*   HEMOGLOBIN 13.3* 12.6*   HEMATOCRIT 42.3 39.3*   PLATELETCT 274 253   PROTHROMBTM 13.1  --    APTT 29.5  --    INR 0.96  --      Recent Labs     21  1239 21  0242   SODIUM 137 133*   POTASSIUM 3.8 3.5*   CHLORIDE 102 100   CO2 22 23   GLUCOSE 100* 180*   BUN 26* 24*   CREATININE 1.12 1.02   CALCIUM 9.3 8.4*     Recent Results (from the past 24 hour(s))   EKG (NOW)    Collection Time: 21 12:59 PM   Result Value Ref Range    Report       [facility]    Test Date:  2021  Pt Name:    HOLLY KIM                 Department: ER  MRN:        [id number]                      Room:        02  Gender:     Male                         Technician: [id number]  :        1980                   Requested By:RUCHI CAAL  Order #:    [id number]                    Reading MD:  RUCHI CAAL MD    Measurements  Intervals                                Axis  Rate:       68                           P:          47  NY:         180                          QRS:        3  QRSD:       120                          T:          32  QT:         401  QTc:        427    Interpretive Statements  Sinus rhythm  Nonspecific intraventricular conduction delay  Baseline wander in lead(s) II,III,aVF,V2  Compared to ECG 01/28/2021 22:17:06  Intraventricular conduction delay now present  ST (T wave) deviation no longer present  T-wave abnormality no longer present  Q waves no longer present  no acute ST chase nges  Electronically Signed On 2-3-2021 13:02:04 PST by RUCHI CAAL MD     VALPROIC ACID    Collection Time: 02/03/21  1:43 PM   Result Value Ref Range    Valproic Acid 66.1 50.0 - 100.0 ug/mL   SARS-CoV-2 PCR (24 hour In-House): Collect NP swab in VTM    Collection Time: 02/03/21  1:43 PM    Specimen: Respirate   Result Value Ref Range    SARS-CoV-2 Source NP Swab     SARS-CoV-2 by PCR NotDetected    URINALYSIS    Collection Time: 02/03/21  4:16 PM    Specimen: Urine   Result Value Ref Range    Color Yellow     Character Clear     Ph 5.0 5.0 - 8.0    Glucose >=1000 (A) Negative mg/dL    Ketones Trace (A) Negative mg/dL    Protein Negative Negative mg/dL    Bilirubin Negative Negative    Urobilinogen, Urine 1.0 Negative    Nitrite Negative Negative    Leukocyte Esterase Negative Negative    Occult Blood Negative Negative    Micro Urine Req see below    URINE DRUG SCREEN    Collection Time: 02/03/21  4:16 PM   Result Value Ref Range    Amphetamines Urine Negative Negative    Barbiturates Negative Negative    Benzodiazepines Negative Negative    Cocaine Metabolite Negative Negative    Methadone Negative Negative    Opiates Negative Negative    Oxycodone Negative Negative    Phencyclidine -Pcp Negative Negative    Propoxyphene Negative Negative    Cannabinoid Metab Negative Negative   REFRACTOMETER  SG    Collection Time: 02/03/21  4:16 PM   Result Value Ref Range    Specific Gravity 1.045    DIAGNOSTIC ALCOHOL    Collection Time: 02/03/21  4:56 PM   Result Value Ref Range    Diagnostic Alcohol <10.1 0.0 - 10.0 mg/dL   FOLATE    Collection Time: 02/03/21  4:56 PM   Result Value Ref Range    Folate -Folic Acid 7.1 >4.0 ng/mL   Hemoglobin A1C    Collection Time: 02/03/21  4:56 PM   Result Value Ref Range    Glycohemoglobin 9.0 (H) 0.0 - 5.6 %    Est Avg Glucose 212 mg/dL   T.PALLIDUM AB EIA    Collection Time: 02/03/21  4:56 PM   Result Value Ref Range    Syphilis, Treponemal Qual Non-Reactive Non-Reactive   TSH    Collection Time: 02/03/21  4:56 PM   Result Value Ref Range    TSH 48.600 (H) 0.380 - 5.330 uIU/mL   VITAMIN B12    Collection Time: 02/03/21  4:56 PM   Result Value Ref Range    Vitamin B12 -True Cobalamin 367 211 - 911 pg/mL   ACCU-CHEK GLUCOSE    Collection Time: 02/03/21  5:20 PM   Result Value Ref Range    Glucose - Accu-Ck 71 65 - 99 mg/dL   ACCU-CHEK GLUCOSE    Collection Time: 02/03/21 10:54 PM   Result Value Ref Range    Glucose - Accu-Ck 67 65 - 99 mg/dL   ACCU-CHEK GLUCOSE    Collection Time: 02/03/21 11:19 PM   Result Value Ref Range    Glucose - Accu-Ck 150 (H) 65 - 99 mg/dL   ACCU-CHEK GLUCOSE    Collection Time: 02/04/21 12:39 AM   Result Value Ref Range    Glucose - Accu-Ck 90 65 - 99 mg/dL   ACCU-CHEK GLUCOSE    Collection Time: 02/04/21  2:13 AM   Result Value Ref Range    Glucose - Accu-Ck 78 65 - 99 mg/dL   CBC without Differential    Collection Time: 02/04/21  2:42 AM   Result Value Ref Range    WBC 8.5 4.8 - 10.8 K/uL    RBC 4.30 (L) 4.70 - 6.10 M/uL    Hemoglobin 12.6 (L) 14.0 - 18.0 g/dL    Hematocrit 39.3 (L) 42.0 - 52.0 %    MCV 91.4 81.4 - 97.8 fL    MCH 29.3 27.0 - 33.0 pg    MCHC 32.1 (L) 33.7 - 35.3 g/dL    RDW 47.0 35.9 - 50.0 fL    Platelet Count 253 164 - 446 K/uL    MPV 10.4 9.0 - 12.9 fL   Comp Metabolic Panel (CMP)    Collection Time: 02/04/21  2:42 AM   Result Value  Ref Range    Sodium 133 (L) 135 - 145 mmol/L    Potassium 3.5 (L) 3.6 - 5.5 mmol/L    Chloride 100 96 - 112 mmol/L    Co2 23 20 - 33 mmol/L    Anion Gap 10.0 7.0 - 16.0    Glucose 180 (H) 65 - 99 mg/dL    Bun 24 (H) 8 - 22 mg/dL    Creatinine 1.02 0.50 - 1.40 mg/dL    Calcium 8.4 (L) 8.5 - 10.5 mg/dL    AST(SGOT) 10 (L) 12 - 45 U/L    ALT(SGPT) 8 2 - 50 U/L    Alkaline Phosphatase 44 30 - 99 U/L    Total Bilirubin 0.4 0.1 - 1.5 mg/dL    Albumin 3.9 3.2 - 4.9 g/dL    Total Protein 6.0 6.0 - 8.2 g/dL    Globulin 2.1 1.9 - 3.5 g/dL    A-G Ratio 1.9 g/dL   Lipid Profile    Collection Time: 02/04/21  2:42 AM   Result Value Ref Range    Cholesterol,Tot 180 100 - 199 mg/dL    Triglycerides 306 (H) 0 - 149 mg/dL    HDL 31 (A) >=40 mg/dL    LDL 88 <100 mg/dL   FREE THYROXINE    Collection Time: 02/04/21  2:42 AM   Result Value Ref Range    Free T-4 0.87 (L) 0.93 - 1.70 ng/dL   ESTIMATED GFR    Collection Time: 02/04/21  2:42 AM   Result Value Ref Range    GFR If African American >60 >60 mL/min/1.73 m 2    GFR If Non African American >60 >60 mL/min/1.73 m 2   TROPONIN    Collection Time: 02/04/21  2:42 AM   Result Value Ref Range    Troponin T 26 (H) 6 - 19 ng/L   ACCU-CHEK GLUCOSE    Collection Time: 02/04/21  3:04 AM   Result Value Ref Range    Glucose - Accu-Ck 142 (H) 65 - 99 mg/dL   ACCU-CHEK GLUCOSE    Collection Time: 02/04/21  5:04 AM   Result Value Ref Range    Glucose - Accu-Ck 95 65 - 99 mg/dL         ASSESSMENT:  Patient is a 40 y.o. male admitted with right hemiparesis now s/p EEG showing high risk for seizures.  Working diagnosis of Santosh's paralysis versus nonorganic/psychogenic etiology    IGC Code / Diagnosis to Support: 0003.9 - Neurologic Conditions: Other Neurologic    Rehabilitation: Impaired ADLs and mobility  Unclear if patient has a diagnosis that is amenable to rehab, will continue to follow for MRI results and final determination on rehab appropriateness    Barriers to transfer include: Insurance  authorization, TCCs to verify disposition, medical clearance and bed availability     Additional Recommendations:  -MRI brain pending  -On aspirin, statin for secondary stroke prophylaxis  -Seizure control with Depakote 500 mg delayed release every morning and 1500 mg delayed release q. evening  -Mood: Topamax 25 mg daily, sertraline 100 mg daily, trazodone 100 mg nightly, Latuda 200 mg with p.m. meal  -Appreciate neurology following    Medical Complexity:  Seizures  Hypertension  Diabetes mellitus type 2    DVT PPX: Lovenox 40 mg injection daily      Thank you for allowing us to participate in the care of this patient.     Patient was seen for 81 minutes on unit/floor of which > 50% of time was spent on counseling and coordination of care regarding the above, including prognosis, risk reduction, benefits of treatment, and options for next stage of care.    Nick Bush, DO   Physical Medicine and Rehabilitation

## 2021-02-04 NOTE — PROGRESS NOTES
Hospital Medicine Daily Progress Note    Date of Service  2/4/2021    Chief Complaint  40 y.o. male admitted 2/3/2021 with weakness    Hospital Course  40 y.o. male with past medical history of seizure disorder, psychiatric disorder, essential hypertension, diabetes mellitus who presented 2/3/2021 with right-sided weakness started around 1130 this morning.  Associated with tingling numbness sensation.  The patient has similar hospitalization in the past.  In the ER he has CT scan of the head was done and showed no acute finding.  Neurology was consulted and recommended the patient is not a TPA candidate and suspect that his symptom is more likely related to seizure.    Interval Problem Update  He reports he had a mild h/a earlier, now resolved, states he cannot feel his right side at all, no seizure activity, ros otherwise negative, axox3  Discussed with nursing and case mgt    Consultants/Specialty  Neurology    Code Status  Full Code    Disposition  tbd    Review of Systems  Review of Systems   Constitutional: Negative.  Negative for chills, diaphoresis, fever, malaise/fatigue and weight loss.   HENT: Negative.  Negative for sore throat.    Eyes: Negative.  Negative for blurred vision.   Respiratory: Negative.  Negative for cough and shortness of breath.    Cardiovascular: Negative.  Negative for chest pain, palpitations and leg swelling.   Gastrointestinal: Negative.  Negative for abdominal pain, nausea and vomiting.   Genitourinary: Negative.  Negative for dysuria.   Musculoskeletal: Negative.  Negative for myalgias.   Skin: Negative.  Negative for itching and rash.   Neurological: Positive for sensory change, focal weakness, weakness and headaches. Negative for dizziness.   Endo/Heme/Allergies: Negative.  Does not bruise/bleed easily.   Psychiatric/Behavioral: Negative.  Negative for depression, substance abuse and suicidal ideas.   All other systems reviewed and are negative.       Physical Exam  Temp:  [36.1  °C (97 °F)-36.7 °C (98 °F)] 36.2 °C (97.2 °F)  Pulse:  [52-60] 58  Resp:  [16-17] 16  BP: (102-123)/(55-68) 105/58  SpO2:  [95 %-96 %] 96 %    Physical Exam  Vitals signs and nursing note reviewed. Exam conducted with a chaperone present.   Constitutional:       General: He is not in acute distress.     Appearance: Normal appearance. He is not diaphoretic.   HENT:      Head: Normocephalic.      Nose: Nose normal.      Mouth/Throat:      Mouth: Mucous membranes are moist.   Eyes:      Pupils: Pupils are equal, round, and reactive to light.      Comments: L eye chronic changes and blindness   Cardiovascular:      Rate and Rhythm: Normal rate and regular rhythm.      Pulses: Normal pulses.      Heart sounds: Normal heart sounds.   Pulmonary:      Effort: Pulmonary effort is normal.      Breath sounds: Normal breath sounds.   Abdominal:      General: Abdomen is flat. Bowel sounds are normal.      Palpations: Abdomen is soft.   Musculoskeletal: Normal range of motion.         General: No swelling or deformity.   Skin:     General: Skin is warm and dry.      Capillary Refill: Capillary refill takes less than 2 seconds.   Neurological:      General: No focal deficit present.      Mental Status: He is alert and oriented to person, place, and time.      Cranial Nerves: No cranial nerve deficit.      Sensory: Sensory deficit present.      Motor: Weakness present.      Comments: Exam inconsistent, 0/5 strength and reported sensation on focused exam but with distraction raised arm against gravity, evidence of sensation of leg and foot as he responded to the table touching his right foot and some movement of right leg independently    Speech is mildly delayed which is reportedly chronic   Psychiatric:         Mood and Affect: Mood normal.         Behavior: Behavior normal.         Fluids    Intake/Output Summary (Last 24 hours) at 2/4/2021 1530  Last data filed at 2/4/2021 1200  Gross per 24 hour   Intake 450 ml   Output 800 ml    Net -350 ml       Laboratory  Recent Labs     02/03/21  1239 02/04/21  0242   WBC 8.1 8.5   RBC 4.69* 4.30*   HEMOGLOBIN 13.3* 12.6*   HEMATOCRIT 42.3 39.3*   MCV 90.2 91.4   MCH 28.4 29.3   MCHC 31.4* 32.1*   RDW 46.8 47.0   PLATELETCT 274 253   MPV 10.5 10.4     Recent Labs     02/03/21  1239 02/04/21  0242   SODIUM 137 133*   POTASSIUM 3.8 3.5*   CHLORIDE 102 100   CO2 22 23   GLUCOSE 100* 180*   BUN 26* 24*   CREATININE 1.12 1.02   CALCIUM 9.3 8.4*     Recent Labs     02/03/21  1239   APTT 29.5   INR 0.96         Recent Labs     02/04/21  0242   TRIGLYCERIDE 306*   HDL 31*   LDL 88       Imaging  DX-CHEST-PORTABLE (1 VIEW)   Final Result      No acute cardiopulmonary abnormality.      CT-CTA NECK WITH & W/O-POST PROCESSING   Final Result      1.  No high-grade stenosis, large vessel occlusion, aneurysm or dissection.   2.  A 3.4 cm heterogeneously enhancing mass in the neck, at the level of the thyroid cartilage. It may represent an ectopic thyroid tissue/nodule. It has grown slightly since 2015 study. Further evaluation with ultrasound is advised on an outpatient    basis.      CT-CTA HEAD WITH & W/O-POST PROCESS   Final Result      1.  CT angiogram of the [place] within normal limits.      2.  Abnormal low attenuation in the periventricular white matter again noted.      CT-HEAD W/O   Final Result      1.  No acute intracranial hemorrhage.      2.  Diffuse confluent periventricular hypodensity as on the prior MRI is consistent with extensive chronic demyelination or dysmyelination as described previously.              Assessment/Plan  ANA (acute kidney injury) (HCC)- (present on admission)  Assessment & Plan  improving    Acute right-sided weakness- (present on admission)  Assessment & Plan  CT scan of the head and CT angiogram head and neck showed no acute finding  Not a TPA candidate  On aspirin and statin  Exam inconsistent  Neurology following   EEG is abnormal - anti seizure meds restarted,  unclear why they were stopped on admit  Query partial seizure or atypical migraine  MRI pending  Continue to follow  Continue PT/OT      Seizure disorder (HCC)- (present on admission)  Assessment & Plan  Seizure precaution, aspiration precaution  Eeg abnormal  Neurology following  Restart Depakote and Topamax    Type 2 diabetes mellitus with hyperglycemia, without long-term current use of insulin (HCC)- (present on admission)  Assessment & Plan  Sliding scale insulin and hypoglycemic protocol    Hypokalemia  Assessment & Plan  Mild  Replacing  Will check mag    Type 2 diabetes mellitus without complication, without long-term current use of insulin (HCC)- (present on admission)  Assessment & Plan  A1c 9  SSI and hypoglycemic protocol    Psychiatric problem- (present on admission)  Assessment & Plan  Unclear if psychologic stress is contributing to his sx given inconsistent exam  Continue supportive care       VTE prophylaxis: lovenox

## 2021-02-04 NOTE — PROGRESS NOTES
2 RN skin check   Preformed with [facility]    Sacrum red and blanching, elbows pink and blanching, heels dry and flaky, blanchable redness to back. Q2 turns and waffle overlay to reduce skin breakdown. Education provided on activity and mobilization encouraged.

## 2021-02-04 NOTE — DISCHARGE PLANNING
[facility]     Referral from:  Dr. Pacheco  Facesheet indicates:  No insurance provider at this time  Potential Rehab Diagnosis:  Hx Epilepsy - Seizure    Chart review indicates patient has on going medical management and may have therapy needs to possibly meet inpatient rehab facility criteria with the goal of returning to community.    D/C support:  To be determined     Physiatry consultation pended while waiting for additional information.  Workup ongoing -  MRI brain pending.  Would welcome therapy evals as clinically appropriate.  TCC will follow.  Please reach out sooner if Physiatry consult requested for medical management.     Last Covid test date:  2/02/21 - results pending      Thank you for the referral.

## 2021-02-05 LAB
ANION GAP SERPL CALC-SCNC: 13 MMOL/L (ref 7–16)
BUN SERPL-MCNC: 23 MG/DL (ref 8–22)
CALCIUM SERPL-MCNC: 9.7 MG/DL (ref 8.5–10.5)
CHLORIDE SERPL-SCNC: 100 MMOL/L (ref 96–112)
CO2 SERPL-SCNC: 23 MMOL/L (ref 20–33)
CREAT SERPL-MCNC: 1.17 MG/DL (ref 0.5–1.4)
GLUCOSE BLD-MCNC: 123 MG/DL (ref 65–99)
GLUCOSE BLD-MCNC: 131 MG/DL (ref 65–99)
GLUCOSE BLD-MCNC: 151 MG/DL (ref 65–99)
GLUCOSE BLD-MCNC: 225 MG/DL (ref 65–99)
GLUCOSE SERPL-MCNC: 129 MG/DL (ref 65–99)
MAGNESIUM SERPL-MCNC: 2.1 MG/DL (ref 1.5–2.5)
POTASSIUM SERPL-SCNC: 4 MMOL/L (ref 3.6–5.5)
SODIUM SERPL-SCNC: 136 MMOL/L (ref 135–145)

## 2021-02-05 PROCEDURE — 80048 BASIC METABOLIC PNL TOTAL CA: CPT

## 2021-02-05 PROCEDURE — 97110 THERAPEUTIC EXERCISES: CPT

## 2021-02-05 PROCEDURE — A9270 NON-COVERED ITEM OR SERVICE: HCPCS | Performed by: HOSPITALIST

## 2021-02-05 PROCEDURE — 36415 COLL VENOUS BLD VENIPUNCTURE: CPT

## 2021-02-05 PROCEDURE — 83735 ASSAY OF MAGNESIUM: CPT

## 2021-02-05 PROCEDURE — 700102 HCHG RX REV CODE 250 W/ 637 OVERRIDE(OP): Performed by: HOSPITALIST

## 2021-02-05 PROCEDURE — 97116 GAIT TRAINING THERAPY: CPT

## 2021-02-05 PROCEDURE — 99232 SBSQ HOSP IP/OBS MODERATE 35: CPT | Performed by: PHYSICAL MEDICINE & REHABILITATION

## 2021-02-05 PROCEDURE — 700111 HCHG RX REV CODE 636 W/ 250 OVERRIDE (IP): Performed by: INTERNAL MEDICINE

## 2021-02-05 PROCEDURE — 97535 SELF CARE MNGMENT TRAINING: CPT

## 2021-02-05 PROCEDURE — G0378 HOSPITAL OBSERVATION PER HR: HCPCS

## 2021-02-05 PROCEDURE — 700102 HCHG RX REV CODE 250 W/ 637 OVERRIDE(OP): Performed by: INTERNAL MEDICINE

## 2021-02-05 PROCEDURE — 96372 THER/PROPH/DIAG INJ SC/IM: CPT

## 2021-02-05 PROCEDURE — A9270 NON-COVERED ITEM OR SERVICE: HCPCS | Performed by: INTERNAL MEDICINE

## 2021-02-05 PROCEDURE — 99226 PR SUBSEQUENT OBSERVATION CARE,LEVEL III: CPT | Performed by: HOSPITALIST

## 2021-02-05 PROCEDURE — 99213 OFFICE O/P EST LOW 20 MIN: CPT | Performed by: NURSE PRACTITIONER

## 2021-02-05 PROCEDURE — 82962 GLUCOSE BLOOD TEST: CPT | Mod: 91

## 2021-02-05 RX ADMIN — BUSPIRONE HYDROCHLORIDE 10 MG: 10 TABLET ORAL at 05:20

## 2021-02-05 RX ADMIN — ATORVASTATIN CALCIUM 80 MG: 80 TABLET, FILM COATED ORAL at 17:43

## 2021-02-05 RX ADMIN — BUSPIRONE HYDROCHLORIDE 10 MG: 10 TABLET ORAL at 17:43

## 2021-02-05 RX ADMIN — METFORMIN HYDROCHLORIDE 1000 MG: 500 TABLET ORAL at 17:44

## 2021-02-05 RX ADMIN — LEVOTHYROXINE SODIUM 225 MCG: 0.2 TABLET ORAL at 05:20

## 2021-02-05 RX ADMIN — BUSPIRONE HYDROCHLORIDE 10 MG: 10 TABLET ORAL at 12:31

## 2021-02-05 RX ADMIN — BUDESONIDE AND FORMOTEROL FUMARATE DIHYDRATE 2 PUFF: 160; 4.5 AEROSOL RESPIRATORY (INHALATION) at 17:43

## 2021-02-05 RX ADMIN — ENOXAPARIN SODIUM 40 MG: 40 INJECTION SUBCUTANEOUS at 17:43

## 2021-02-05 RX ADMIN — MONTELUKAST 10 MG: 10 TABLET, FILM COATED ORAL at 20:58

## 2021-02-05 RX ADMIN — TOPIRAMATE 25 MG: 25 TABLET, FILM COATED ORAL at 05:20

## 2021-02-05 RX ADMIN — INSULIN HUMAN 1 UNITS: 100 INJECTION, SOLUTION PARENTERAL at 17:54

## 2021-02-05 RX ADMIN — METFORMIN HYDROCHLORIDE 1000 MG: 500 TABLET ORAL at 09:09

## 2021-02-05 RX ADMIN — ASPIRIN 81 MG: 81 TABLET, COATED ORAL at 05:19

## 2021-02-05 RX ADMIN — ACETAMINOPHEN 650 MG: 325 TABLET ORAL at 09:09

## 2021-02-05 RX ADMIN — DIVALPROEX SODIUM 500 MG: 500 TABLET, DELAYED RELEASE ORAL at 05:20

## 2021-02-05 RX ADMIN — FAMOTIDINE 10 MG: 20 TABLET ORAL at 17:43

## 2021-02-05 RX ADMIN — OMEPRAZOLE 20 MG: 20 CAPSULE, DELAYED RELEASE ORAL at 17:43

## 2021-02-05 RX ADMIN — SERTRALINE HYDROCHLORIDE 100 MG: 100 TABLET ORAL at 05:20

## 2021-02-05 RX ADMIN — LURASIDONE HYDROCHLORIDE 20 MG: 20 TABLET, FILM COATED ORAL at 17:43

## 2021-02-05 RX ADMIN — FENOFIBRATE 134 MG: 134 CAPSULE ORAL at 17:43

## 2021-02-05 RX ADMIN — FAMOTIDINE 10 MG: 20 TABLET ORAL at 05:20

## 2021-02-05 RX ADMIN — DIVALPROEX SODIUM 1500 MG: 500 TABLET, DELAYED RELEASE ORAL at 17:44

## 2021-02-05 RX ADMIN — INSULIN HUMAN 2 UNITS: 100 INJECTION, SOLUTION PARENTERAL at 12:26

## 2021-02-05 RX ADMIN — OMEPRAZOLE 20 MG: 20 CAPSULE, DELAYED RELEASE ORAL at 05:20

## 2021-02-05 RX ADMIN — Medication 4000 UNITS: at 20:58

## 2021-02-05 ASSESSMENT — ENCOUNTER SYMPTOMS
PND: 0
FOCAL WEAKNESS: 1
DIAPHORESIS: 0
SENSORY CHANGE: 1
GASTROINTESTINAL NEGATIVE: 1
WEAKNESS: 1
CLAUDICATION: 0
EYE DISCHARGE: 0
CHILLS: 0
COUGH: 0
HEARTBURN: 1
HEADACHES: 1
MUSCULOSKELETAL NEGATIVE: 1
DOUBLE VISION: 0
ORTHOPNEA: 0
EYES NEGATIVE: 1
CONSTITUTIONAL NEGATIVE: 1
DIZZINESS: 0
PSYCHIATRIC NEGATIVE: 1
EYE PAIN: 0
HEMOPTYSIS: 0
EYE REDNESS: 0
DEPRESSION: 0
FEVER: 0
SORE THROAT: 0
PALPITATIONS: 0
RESPIRATORY NEGATIVE: 1
VOMITING: 0
WEIGHT LOSS: 0
CARDIOVASCULAR NEGATIVE: 1
NAUSEA: 0
BLURRED VISION: 0
STRIDOR: 0
MYALGIAS: 0
PHOTOPHOBIA: 0
SHORTNESS OF BREATH: 0
WHEEZING: 0
ABDOMINAL PAIN: 0
BRUISES/BLEEDS EASILY: 0

## 2021-02-05 ASSESSMENT — COGNITIVE AND FUNCTIONAL STATUS - GENERAL
SUGGESTED CMS G CODE MODIFIER DAILY ACTIVITY: CK
TOILETING: A LITTLE
STANDING UP FROM CHAIR USING ARMS: A LITTLE
DRESSING REGULAR UPPER BODY CLOTHING: A LITTLE
MOVING FROM LYING ON BACK TO SITTING ON SIDE OF FLAT BED: A LITTLE
WALKING IN HOSPITAL ROOM: A LITTLE
DAILY ACTIVITIY SCORE: 18
CLIMB 3 TO 5 STEPS WITH RAILING: A LOT
PERSONAL GROOMING: A LITTLE
HELP NEEDED FOR BATHING: A LITTLE
SUGGESTED CMS G CODE MODIFIER MOBILITY: CK
MOBILITY SCORE: 17
TURNING FROM BACK TO SIDE WHILE IN FLAT BAD: A LITTLE
MOVING TO AND FROM BED TO CHAIR: A LITTLE
DRESSING REGULAR LOWER BODY CLOTHING: A LITTLE
EATING MEALS: A LITTLE

## 2021-02-05 ASSESSMENT — PAIN DESCRIPTION - PAIN TYPE
TYPE: ACUTE PAIN

## 2021-02-05 ASSESSMENT — GAIT ASSESSMENTS
ASSISTIVE DEVICE: FRONT WHEEL WALKER
DISTANCE (FEET): 5

## 2021-02-05 ASSESSMENT — LIFESTYLE VARIABLES: SUBSTANCE_ABUSE: 0

## 2021-02-05 NOTE — PROGRESS NOTES
"Hospital Medicine Daily Progress Note    Date of Service  2/5/2021    Chief Complaint  40 y.o. male admitted 2/3/2021 with weakness    Hospital Course  40 y.o. male with past medical history of seizure disorder, psychiatric disorder, essential hypertension, diabetes mellitus who presented 2/3/2021 with right-sided weakness started around 1130 this morning.  Associated with tingling numbness sensation.  The patient has similar hospitalization in the past.  In the ER he has CT scan of the head was done and showed no acute finding.  Neurology was consulted and recommended the patient is not a TPA candidate and suspect that his symptom is more likely related to seizure.    Interval Problem Update  His exam remains inconsistent bringing up concerns of component of malingering/conversion disorder. I observed him easily moving his right leg as I entered his room, when I asked how how is right side was today he answered \" it's the same I can't move it at all and I can't feel it\".  I then told him that I was pleased because I had just witnessed his leg moving and that this was a very reassuring finding he then said ..... \"um.... oh yeah, that's because I've really been working on it, but I still can't move my arm.\"  After our exam, I was with his roommate and I witnessed him easily using his right arm and hand on his computer.  He denies headache, no n/v, no seizure activity.  Axox3.  ROS otherwise negative.  Discussed with neurology who recommends d/c if there are no acute finding on MRI, nursing and case mgt.    Consultants/Specialty  Neurology    Code Status  Full Code    Disposition  tbd    Review of Systems  Review of Systems   Constitutional: Negative.  Negative for chills, diaphoresis, fever, malaise/fatigue and weight loss.   HENT: Negative.  Negative for sore throat.    Eyes: Negative.  Negative for blurred vision.   Respiratory: Negative.  Negative for cough and shortness of breath.    Cardiovascular: Negative.  " Negative for chest pain, palpitations and leg swelling.   Gastrointestinal: Negative.  Negative for abdominal pain, nausea and vomiting.   Genitourinary: Negative.  Negative for dysuria.   Musculoskeletal: Negative.  Negative for myalgias.   Skin: Negative.  Negative for itching and rash.   Neurological: Positive for sensory change, focal weakness, weakness and headaches. Negative for dizziness.   Endo/Heme/Allergies: Negative.  Does not bruise/bleed easily.   Psychiatric/Behavioral: Negative.  Negative for depression, substance abuse and suicidal ideas.   All other systems reviewed and are negative.       Physical Exam  Temp:  [36.1 °C (96.9 °F)-36.6 °C (97.8 °F)] 36.1 °C (96.9 °F)  Pulse:  [47-81] 81  Resp:  [16-18] 18  BP: (110-143)/(63-88) 143/88  SpO2:  [92 %-100 %] 95 %    Physical Exam  Vitals signs and nursing note reviewed. Exam conducted with a chaperone present.   Constitutional:       General: He is not in acute distress.     Appearance: Normal appearance. He is not diaphoretic.   HENT:      Head: Normocephalic.      Nose: Nose normal.      Mouth/Throat:      Mouth: Mucous membranes are moist.   Eyes:      Pupils: Pupils are equal, round, and reactive to light.      Comments: L eye chronic changes and blindness   Cardiovascular:      Rate and Rhythm: Normal rate and regular rhythm.      Pulses: Normal pulses.      Heart sounds: Normal heart sounds.   Pulmonary:      Effort: Pulmonary effort is normal.      Breath sounds: Normal breath sounds.   Abdominal:      General: Abdomen is flat. Bowel sounds are normal.      Palpations: Abdomen is soft.   Musculoskeletal: Normal range of motion.         General: No swelling or deformity.   Skin:     General: Skin is warm and dry.      Capillary Refill: Capillary refill takes less than 2 seconds.   Neurological:      General: No focal deficit present.      Mental Status: He is alert and oriented to person, place, and time.      Cranial Nerves: No cranial nerve  deficit.      Sensory: Sensory deficit present.      Motor: Weakness present.      Comments: Exam remains inconsistent, see above, left 5/5, right varies, witnessed pt overcome gravity with both left arm and leg     Psychiatric:         Mood and Affect: Mood normal.         Behavior: Behavior normal.         Fluids    Intake/Output Summary (Last 24 hours) at 2/5/2021 1229  Last data filed at 2/5/2021 0800  Gross per 24 hour   Intake 250 ml   Output 2750 ml   Net -2500 ml       Laboratory  Recent Labs     02/03/21  1239 02/04/21  0242   WBC 8.1 8.5   RBC 4.69* 4.30*   HEMOGLOBIN 13.3* 12.6*   HEMATOCRIT 42.3 39.3*   MCV 90.2 91.4   MCH 28.4 29.3   MCHC 31.4* 32.1*   RDW 46.8 47.0   PLATELETCT 274 253   MPV 10.5 10.4     Recent Labs     02/03/21  1239 02/04/21  0242 02/05/21  0304   SODIUM 137 133* 136   POTASSIUM 3.8 3.5* 4.0   CHLORIDE 102 100 100   CO2 22 23 23   GLUCOSE 100* 180* 129*   BUN 26* 24* 23*   CREATININE 1.12 1.02 1.17   CALCIUM 9.3 8.4* 9.7     Recent Labs     02/03/21  1239   APTT 29.5   INR 0.96         Recent Labs     02/04/21  0242   TRIGLYCERIDE 306*   HDL 31*   LDL 88       Imaging  DX-CHEST-PORTABLE (1 VIEW)   Final Result      No acute cardiopulmonary abnormality.      CT-CTA NECK WITH & W/O-POST PROCESSING   Final Result      1.  No high-grade stenosis, large vessel occlusion, aneurysm or dissection.   2.  A 3.4 cm heterogeneously enhancing mass in the neck, at the level of the thyroid cartilage. It may represent an ectopic thyroid tissue/nodule. It has grown slightly since 2015 study. Further evaluation with ultrasound is advised on an outpatient    basis.      CT-CTA HEAD WITH & W/O-POST PROCESS   Final Result      1.  CT angiogram of the [place] within normal limits.      2.  Abnormal low attenuation in the periventricular white matter again noted.      CT-HEAD W/O   Final Result      1.  No acute intracranial hemorrhage.      2.  Diffuse confluent periventricular hypodensity as on  the prior MRI is consistent with extensive chronic demyelination or dysmyelination as described previously.              Assessment/Plan  ANA (acute kidney injury) (HCC)- (present on admission)  Assessment & Plan  In baseline range    Acute right-sided weakness- (present on admission)  Assessment & Plan  CT scan of the head and CT angiogram head and neck showed no acute finding  See above  On aspirin and statin  Exam remains inconsistent  Neurology following   EEG is abnormal - anti seizure meds restarted, unclear why they were stopped on admit - discussed with neuro and the meds being held on admit are the likely etiology - recommendation to continue current regimen  MRI still pending  Continue to follow  Continue PT/OT      Seizure disorder (HCC)- (present on admission)  Assessment & Plan  Seizure precaution, aspiration precaution  Eeg abnormal  Neurology following  Restart Depakote and Topamax    Type 2 diabetes mellitus with hyperglycemia, without long-term current use of insulin ([facility])- (present on admission)  Assessment & Plan  Sliding scale insulin and hypoglycemic protocol    Hypokalemia  Assessment & Plan  resolved    Type 2 diabetes mellitus without complication, without long-term current use of insulin ([facility])- (present on admission)  Assessment & Plan  A1c 9  SSI and hypoglycemic protocol    Psychiatric problem- (present on admission)  Assessment & Plan  Unclear if psychologic stress is contributing to his sx given inconsistent exam  Continue supportive care       VTE prophylaxis: lovenox

## 2021-02-05 NOTE — DISCHARGE PLANNING
Anticipated Discharge Disposition:     Action: Lsw called Marry [phone] who confirmed that pt is a resident with [facility] group housing. Marry stated that pt should be completely independent prior to returning to  and if he were to need any DME, to notify them beforehand. Marry confirmed that pt can just get voucher back to , address on facesheet verified.     Barriers to Discharge: medical clearance    Plan: Lsw to f/u as needed

## 2021-02-05 NOTE — PROGRESS NOTES
Physical Medicine and Rehabilitation Consultation              Date of initial consultation: 2/4/2021  Consulting provider: Brandan Pacheco MD  Reason for consultation: assess for acute inpatient rehab appropriateness  LOS: 0 Day(s)    Chief complaint: Possible Santosh's paralysis    HPI: The patient is a 40 y.o. right hand dominant male with a past medical history of anxiety, depression, bipolar disorder, developmental delay, seizure disorder, diabetes type 2, hypothyroidism;  who presented on 2/3/2021 12:34 PM with sudden onset right-sided weakness/ paresthesias.  Weakness affected both right upper and lower extremity.  Patient was seen by neurology who found NIH score of 7, but low suspicion of ischemic event, therefore did not receive TPA and is awaiting MRI.  CT head normal.  EEG yesterday reported abundant findings suggestive of a very high risk for seizures arising from multiple areas in the brain.    The patient currently reports complete flaccid paralysis of the right upper and lower extremity.  Patient confirms he is living in a group home but states he has a fiancée in [place] and is trying to figure out how to get out there.  He endorses headache otherwise negative ROS    2/5/2021  Patient continues to complain of right-sided hemiparesis.  He does demonstrate some movement of the right lower extremity to me today.  Dr. Mak MD note describes voluntary movement of the right upper and lower extremity consistent with malingering.  Still awaiting MR brain    Social Hx:  Patient lives in a group home with 2 flights of stairs to enter.  Patient independent at the home.    THERAPY:  PT: Functional mobility   2/4: Min assist    OT: ADLs  2/4: Mod assist lower body dressing    SLP:   2/4: Regular diet    IMAGING:  CT head 2/3/2020  1.  No acute intracranial hemorrhage.     2.  Diffuse confluent periventricular hypodensity as on the prior MRI is consistent with extensive chronic demyelination or dysmyelination as  described previously.    PROCEDURES:  Robbin Milian MD EEG  Abnormal video EEG recording in the awake and drowsy state(s):  - Abundant independent multifocal spikes/polyspikes seen in the left greater than right temporal lobes, occipital lobes, or parietal lobes, often periodic or rhythmic at 1.5 to 3 Hz for 2 to 8 seconds. In addition, there is frequent bitemporal rhythmic delta slowing at 2 to 3 Hz, often with embedded spikes, occurring both synchronously as well as asynchronously.  The combination of the above findings are suggestive of a very high risk for seizures arising from multiple areas in the brain.  -Occasional right frontal slowing suggestive of focal dysfunction in this region.  Clinical and radiographic correlation recommended.  -No definitive electroclinical seizures.  However, patient is at high risk for seizures.  Patient is not back to his neurological baseline with consider more prolonged monitoring to rule out clinical and subclinical seizures.    PMH:  Past Medical History:   Diagnosis Date   • Anxiety     BIPOLAR   • ASTHMA    • Bipolar 1 disorder ([facility])    • Depression    • Diabetes ([facility])     Type II Diabetes   • Fall     passed out 2 wks ago   • Glaucoma    • Glaucoma 1982    both eyes/ blind on left eye   • Hypothyroidism    • Indigestion     once in a while   • Mental disorder     learning disabilities; speech impairment; developmental delays   • Murmur     since birth   • Pneumonia     remote   • Psychiatric problem 2002    PTSD   • S/P thyroidectomy    • Seizure ([facility]) 2010   • Seizure disorder ([facility])    • Unspecified disorder of thyroid        PSH:  Past Surgical History:   Procedure Laterality Date   • EYE SURGERY     • OTHER      Hernia Repair when he was 10 yrs old   • THYROID LOBECTOMY         FHX:  Family History   Problem Relation Age of Onset   • Hypertension Mother    • Heart Disease Mother    • Lung Disease Mother    • Stroke Maternal Grandmother        Medications:  Current  Facility-Administered Medications   Medication Dose   • topiramate (TOPAMAX) tablet 25 mg  25 mg   • sertraline (Zoloft) tablet 100 mg  100 mg   • prazosin (MINIPRESS) capsule 4 mg  4 mg   • metFORMIN (GLUCOPHAGE) tablet 1,000 mg  1,000 mg   • lurasidone (LATUDA) tablet 20 mg  20 mg   • lisinopril (PRINIVIL) tablet 10 mg  10 mg   • montelukast (SINGULAIR) tablet 10 mg  10 mg   • fenofibrate micronized (LOFIBRA) capsule 134 mg  134 mg   • vitamin D (cholecalciferol) tablet 4,000 Units  4,000 Units   • divalproex (DEPAKOTE) delayed-release tablet 500 mg  500 mg    And   • divalproex (DEPAKOTE) delayed-release tablet 1,500 mg  1,500 mg   • insulin regular (HumuLIN R,NovoLIN R) injection  1-6 Units    And   • glucose 4 g chewable tablet 16 g  16 g    And   • dextrose 50% (D50W) injection 50 mL  50 mL   • aspirin EC (ECOTRIN) tablet 81 mg  81 mg   • atorvastatin (LIPITOR) tablet 80 mg  80 mg   • budesonide-formoterol (SYMBICORT) 160-4.5 MCG/ACT inhaler 2 Puff  2 Puff   • busPIRone (BUSPAR) tablet 10 mg  10 mg   • famotidine (PEPCID) tablet 10 mg  10 mg   • levothyroxine (SYNTHROID) tablet 225 mcg  225 mcg   • traZODone (DESYREL) tablet 100 mg  100 mg   • Pharmacy consult request - Allow for permissive hypertension: SBP up to 220 mmHg/DBP up to 120 mmHg x 48 hours     • senna-docusate (PERICOLACE or SENOKOT S) 8.6-50 MG per tablet 2 Tab  2 Tab    And   • polyethylene glycol/lytes (MIRALAX) PACKET 1 Packet  1 Packet    And   • magnesium hydroxide (MILK OF MAGNESIA) suspension 30 mL  30 mL    And   • bisacodyl (DULCOLAX) suppository 10 mg  10 mg   • enoxaparin (LOVENOX) inj 40 mg  40 mg   • acetaminophen (Tylenol) tablet 650 mg  650 mg   • ondansetron (ZOFRAN) syringe/vial injection 4 mg  4 mg   • ondansetron (ZOFRAN ODT) dispertab 4 mg  4 mg   • promethazine (PHENERGAN) tablet 12.5-25 mg  12.5-25 mg   • promethazine (PHENERGAN) suppository 12.5-25 mg  12.5-25 mg   • prochlorperazine (COMPAZINE) injection 5-10 mg  5-10 mg  "  • LORazepam (ATIVAN) injection 4 mg  4 mg   • omeprazole (PRILOSEC) capsule 20 mg  20 mg       Allergies:  Allergies   Allergen Reactions   • Geodon [Ziprasidone Hcl] Anaphylaxis     Anaphylaxis per patient   • Abilify      \"Feeling tired, like I don't even know whats going on around me\"   • Fish      Pt reports fish causes him to be sick to his stomach  Not listed on MAR noted 2/3/2021         Physical Exam:  Vitals: /88   Pulse 81   Temp 36.1 °C (96.9 °F) (Temporal)   Resp 18   Wt (!) 135 kg (297 lb 13.5 oz)   SpO2 95%   Gen: NAD  Head: NC/AT  Eyes/ Nose/ Mouth: PERRLA, moist mucous membranes  Cardio: RRR, good distal perfusion, warm extremities  Pulm: normal respiratory effort, no cyanosis   Abd: Soft NTND, negative borborygmi   Ext: No peripheral edema. No calf tenderness. No clubbing.    Mental status: answers questions appropriately follows commands  Speech: fluent, no aphasia or dysarthria    CRANIAL NERVES:  2,3: visual acuity grossly intact, PERRL  3,4,6: EOMI bilaterally, no nystagmus or diplopia  5: sensation intact to light touch bilaterally and symmetric  7: no facial asymmetry  8: hearing grossly intact  9,10: symmetric palate elevation  11: SCM/Trapezius strength 5/5 bilaterally  12: tongue protrudes midline    Motor:      Upper Extremity  Myotome R L   Shoulder flexion C5 0/5 5   Elbow flexion C5 0/5 5   Wrist extension C6 0/5 5   Elbow extension C7 0/5 5   Finger flexion C8 0/5 5   Finger abduction T1 0/5 5     Lower Extremity Myotome R L   Hip flexion L2 0/5 5   Knee extension L3 0/5 5   Ankle dorsiflexion L4 1/5 5   Toe extension L5 1/5 5   Ankle plantarflexion S1 0/5 5     Sensory:   intact to light touch through out    DTRs:  Right  Left    Brachioradialis  2+  2+   Patella tendon  2+ 2+     Labs: Reviewed and significant for   Recent Labs     02/03/21  1239 02/04/21  0242   RBC 4.69* 4.30*   HEMOGLOBIN 13.3* 12.6*   HEMATOCRIT 42.3 39.3*   PLATELETCT 274 253   PROTHROMBTM 13.1  --  "   APTT 29.5  --    INR 0.96  --      Recent Labs     02/03/21  1239 02/04/21  0242 02/05/21  0304   SODIUM 137 133* 136   POTASSIUM 3.8 3.5* 4.0   CHLORIDE 102 100 100   CO2 22 23 23   GLUCOSE 100* 180* 129*   BUN 26* 24* 23*   CREATININE 1.12 1.02 1.17   CALCIUM 9.3 8.4* 9.7     Recent Results (from the past 24 hour(s))   ACCU-CHEK GLUCOSE    Collection Time: 02/04/21 12:40 PM   Result Value Ref Range    Glucose - Accu-Ck 135 (H) 65 - 99 mg/dL   ACCU-CHEK GLUCOSE    Collection Time: 02/04/21  5:05 PM   Result Value Ref Range    Glucose - Accu-Ck 142 (H) 65 - 99 mg/dL   ACCU-CHEK GLUCOSE    Collection Time: 02/04/21  8:19 PM   Result Value Ref Range    Glucose - Accu-Ck 161 (H) 65 - 99 mg/dL   MAGNESIUM    Collection Time: 02/05/21  3:04 AM   Result Value Ref Range    Magnesium 2.1 1.5 - 2.5 mg/dL   Basic Metabolic Panel    Collection Time: 02/05/21  3:04 AM   Result Value Ref Range    Sodium 136 135 - 145 mmol/L    Potassium 4.0 3.6 - 5.5 mmol/L    Chloride 100 96 - 112 mmol/L    Co2 23 20 - 33 mmol/L    Glucose 129 (H) 65 - 99 mg/dL    Bun 23 (H) 8 - 22 mg/dL    Creatinine 1.17 0.50 - 1.40 mg/dL    Calcium 9.7 8.5 - 10.5 mg/dL    Anion Gap 13.0 7.0 - 16.0   ESTIMATED GFR    Collection Time: 02/05/21  3:04 AM   Result Value Ref Range    GFR If African American >60 >60 mL/min/1.73 m 2    GFR If Non African American >60 >60 mL/min/1.73 m 2   ACCU-CHEK GLUCOSE    Collection Time: 02/05/21  9:08 AM   Result Value Ref Range    Glucose - Accu-Ck 131 (H) 65 - 99 mg/dL         ASSESSMENT:  Patient is a 40 y.o. male admitted with right hemiparesis now s/p EEG showing high risk for seizures.  Awaiting MR brain    [facility] Code / Diagnosis to Support: 0003.9 - Neurologic Conditions: Other Neurologic    Rehabilitation: Impaired ADLs and mobility  Unclear if patient has a diagnosis that is amenable to rehab, will continue to follow for MRI results and final determination on rehab appropriateness    Barriers to transfer include:  Insurance authorization, TCCs to verify disposition, medical clearance and bed availability     Additional Recommendations:  -MRI brain pending  -On aspirin, statin for secondary stroke prophylaxis  -Seizure control with Depakote 500 mg delayed release every morning and 1500 mg delayed release q. evening  -Mood: Topamax 25 mg daily, sertraline 100 mg daily, trazodone 100 mg nightly, Latuda 200 mg with p.m. meal  -Appreciate neurology following  -We will await for MR brain results, if negative PMR will sign off    Medical Complexity:  Seizures  Hypertension  Diabetes mellitus type 2    DVT PPX: Lovenox 40 mg injection daily      Thank you for allowing us to participate in the care of this patient.     Patient was seen for 28 minutes on unit/floor of which > 50% of time was spent on counseling and coordination of care regarding the above, including prognosis, risk reduction, benefits of treatment, and options for next stage of care.    Nick Bush, DO   Physical Medicine and Rehabilitation

## 2021-02-05 NOTE — PROGRESS NOTES
Monitor summary: SR/SB 51-68 , RI .16 , QRS .10 , QT .34 , SR/SB with occasional PVCs and PACs  per strip from monitor room.

## 2021-02-05 NOTE — NON-PROVIDER
"CC: \"I woke up and couldn't feel the Right side of my body\"     History of Presenting Illness  40 y.o. male with a past medical history of DM, HLD, migraines, multiple concussions, and seizure disorder presented to the ED after a 1 week history of tingling sensation throughout the right side of his body and persistent headache described as \"an elephant on my head\", and chalky taste in his mouth.      Pt went to CT at which point they ruled out hemmorhagic stroke, pt is currently being worked up for ischemic stroke.       Interval Update:  2/4 - Pt reports he is doing well, other than a headache described as 8/10 intensity, dull throbbing sensation, without radiation, worse with light and sound, better with darkness and silence. Pt also endorses some mild epigastric pain. Speech is slurred and pt periodically stutters. Pt reports no difficulty swallowing and normal bowel and bladder sensation and function without diarrhea or burning. NIH scale of 13     2/5 - Pt reports he is doing well, slept well, still has dull throbbing headache described as about a 2/10 in intensity w/o radiation. Pt denies photophobia or phonophobia. Pt continues to endorse some mild epigastric discomfort. Speech is still slurred and and pt stutters. Pt right sided facial droop appears to be progressing, and is better today than yesterday. Pt continues to report that he cannot feel any part of his right side, but reported he could slightly move his left leg. On attending examination of patient it was discovered that he was able to use his right arm freely.     Review of Systems  Review of Systems   Constitutional: Negative for chills, diaphoresis, fever, malaise/fatigue and weight loss.   HENT: Positive for tinnitus. Negative for ear pain, hearing loss, nosebleeds and sore throat.    Eyes: Negative for blurred vision, double vision, photophobia, pain, discharge and redness.   Respiratory: Negative for cough, hemoptysis, shortness of breath, " wheezing and stridor.    Cardiovascular: Negative for chest pain, palpitations, orthopnea, claudication, leg swelling and PND.   Gastrointestinal: Positive for heartburn.   Genitourinary: Negative for dysuria and hematuria.   Musculoskeletal: Negative for myalgias.   Skin: Negative for itching and rash.   Neurological: Positive for focal weakness and headaches. Negative for dizziness.       Past Medical History  Past Medical History:   Diagnosis Date   • Anxiety     BIPOLAR   • ASTHMA    • Bipolar 1 disorder (Formerly [facility])    • Depression    • Diabetes (Formerly [facility])     Type II Diabetes   • Fall     passed out 2 wks ago   • Glaucoma    • Glaucoma 1982    both eyes/ blind on left eye   • Hypothyroidism    • Indigestion     once in a while   • Mental disorder     learning disabilities; speech impairment; developmental delays   • Murmur     since birth   • Pneumonia     remote   • Psychiatric problem 2002    PTSD   • S/P thyroidectomy    • Seizure (Formerly [facility]) 2010   • Seizure disorder (Formerly [facility])    • Unspecified disorder of thyroid        Surgical History  Past Surgical History:   Procedure Laterality Date   • EYE SURGERY     • OTHER      Hernia Repair when he was 10 yrs old   • THYROID LOBECTOMY         Family History  Family History   Problem Relation Age of Onset   • Hypertension Mother    • Heart Disease Mother    • Lung Disease Mother    • Stroke Maternal Grandmother        Social History  Social History     Socioeconomic History   • Marital status: Single     Spouse name: Not on file   • Number of children: Not on file   • Years of education: Not on file   • Highest education level: Not on file   Occupational History   • Not on file   Social Needs   • Financial resource strain: Somewhat hard   • Food insecurity     Worry: Sometimes true     Inability: Sometimes true   • Transportation needs     Medical: No     Non-medical: No   Tobacco Use   • Smoking status: Current Every Day Smoker     Packs/day: 0.25     Types: Cigarettes, Cigars     Last  attempt to quit: 2020     Years since quittin.6   • Smokeless tobacco: Never Used   • Tobacco comment: 3 cigarettes a day   Substance and Sexual Activity   • Alcohol use: Not Currently     Frequency: 2-3 times a week   • Drug use: Yes     Types: Inhaled     Comment: marijuana   • Sexual activity: Not on file   Lifestyle   • Physical activity     Days per week: Not on file     Minutes per session: Not on file   • Stress: Not on file   Relationships   • Social connections     Talks on phone: Not on file     Gets together: Not on file     Attends Church service: Not on file     Active member of club or organization: Not on file     Attends meetings of clubs or organizations: Not on file     Relationship status: Not on file   • Intimate partner violence     Fear of current or ex partner: Not on file     Emotionally abused: Not on file     Physically abused: Not on file     Forced sexual activity: Not on file   Other Topics Concern   • Not on file   Social History Narrative   • Not on file       Medications  Current Facility-Administered Medications   Medication Dose Route Frequency Provider Last Rate Last Admin   • topiramate (TOPAMAX) tablet 25 mg  25 mg Oral DAILY Aniya Hoyos M.D.   25 mg at 21 0520   • sertraline (Zoloft) tablet 100 mg  100 mg Oral DAILY nAiya Hoyos M.D.   100 mg at 21 0520   • prazosin (MINIPRESS) capsule 4 mg  4 mg Oral QHS Aniya Hoyos M.D.   Stopped at 21 2100   • metFORMIN (GLUCOPHAGE) tablet 1,000 mg  1,000 mg Oral BID WITH MEALS Aniya Hoyos M.D.   1,000 mg at 21 0909   • lurasidone (LATUDA) tablet 20 mg  20 mg Oral PM MEAL Aniya Hoyos M.D.   20 mg at 21 1740   • lisinopril (PRINIVIL) tablet 10 mg  10 mg Oral DAILY Aniya Hoyos M.D.   Stopped at 21 0510   • montelukast (SINGULAIR) tablet 10 mg  10 mg Oral QHS Aniya Hoyos M.D.   10 mg at 21 2018   • fenofibrate micronized (LOFIBRA) capsule 134 mg  134 mg Oral Q EVENING Aniya CARLSON  ROSA Hoyos   134 mg at 02/04/21 1740   • vitamin D (cholecalciferol) tablet 4,000 Units  4,000 Units Oral QHS Aniya Hoyos M.D.   4,000 Units at 02/04/21 2018   • divalproex (DEPAKOTE) delayed-release tablet 500 mg  500 mg Oral QAM Aniya Hoyos M.D.   500 mg at 02/05/21 0520    And   • divalproex (DEPAKOTE) delayed-release tablet 1,500 mg  1,500 mg Oral Q EVENING Aniya Hoyos M.D.       • insulin regular (HumuLIN R,NovoLIN R) injection  1-6 Units Subcutaneous 4X/DAY ACHS Aniya Hoyos M.D.   2 Units at 02/05/21 1226    And   • glucose 4 g chewable tablet 16 g  16 g Oral Q15 MIN PRN Aniya Hoyos M.D.        And   • dextrose 50% (D50W) injection 50 mL  50 mL Intravenous Q15 MIN PRN Aniya Hoyos M.D.       • aspirin EC (ECOTRIN) tablet 81 mg  81 mg Oral QAM Brandan Pacheco M.D.   81 mg at 02/05/21 0519   • atorvastatin (LIPITOR) tablet 80 mg  80 mg Oral Q EVENING Brandan Pacheco M.D.   80 mg at 02/04/21 1740   • budesonide-formoterol (SYMBICORT) 160-4.5 MCG/ACT inhaler 2 Puff  2 Puff Inhalation BID Brandan Pacheco M.D.   2 Puff at 02/04/21 1741   • busPIRone (BUSPAR) tablet 10 mg  10 mg Oral TID Brandan Pacheco M.D.   10 mg at 02/05/21 1231   • famotidine (PEPCID) tablet 10 mg  10 mg Oral BID Brandan Pacheco M.D.   10 mg at 02/05/21 0520   • levothyroxine (SYNTHROID) tablet 225 mcg  225 mcg Oral AM ES Brandan Pacheco M.D.   225 mcg at 02/05/21 0520   • traZODone (DESYREL) tablet 100 mg  100 mg Oral HS PRN Brandan Pacheco M.D.       • senna-docusate (PERICOLACE or SENOKOT S) 8.6-50 MG per tablet 2 Tab  2 Tab Oral BID Brandan Pacheco M.D.   2 Tab at 02/04/21 2174    And   • polyethylene glycol/lytes (MIRALAX) PACKET 1 Packet  1 Packet Oral QDAY PRN Brandan Pacheco M.D.        And   • magnesium hydroxide (MILK OF MAGNESIA) suspension 30 mL  30 mL Oral QDAY PRN Brandan Pacheco M.D.        And   • bisacodyl (DULCOLAX) suppository 10 mg  10 mg Rectal QDAY PRN Brandan Pacheco M.D.       • enoxaparin (LOVENOX) inj 40 mg  40 mg Subcutaneous Q EVENING Brandan Pacheco,  "M.D.   40 mg at 02/04/21 1739   • acetaminophen (Tylenol) tablet 650 mg  650 mg Oral Q6HRS PRN Brandan Pacheco M.D.   650 mg at 02/05/21 0909   • ondansetron (ZOFRAN) syringe/vial injection 4 mg  4 mg Intravenous Q4HRS PRN Brandan Pacheco M.D.       • ondansetron (ZOFRAN ODT) dispertab 4 mg  4 mg Oral Q4HRS PRN Brandan Pacheco M.D.       • promethazine (PHENERGAN) tablet 12.5-25 mg  12.5-25 mg Oral Q4HRS PRN Brandan Pacheoc M.D.       • promethazine (PHENERGAN) suppository 12.5-25 mg  12.5-25 mg Rectal Q4HRS PRN Brandan Pacheco M.D.       • prochlorperazine (COMPAZINE) injection 5-10 mg  5-10 mg Intravenous Q4HRS PRN Brandan Pacheco M.D.       • LORazepam (ATIVAN) injection 4 mg  4 mg Intravenous Q10 MIN PRN Brandan Pacheco M.D.       • omeprazole (PRILOSEC) capsule 20 mg  20 mg Oral BID Brandan Pacheco M.D.   20 mg at 02/05/21 0520       Allergies  Allergies   Allergen Reactions   • Geodon [Ziprasidone Hcl] Anaphylaxis     Anaphylaxis per patient   • Abilify      \"Feeling tired, like I don't even know whats going on around me\"   • Fish      Pt reports fish causes him to be sick to his stomach  Not listed on MAR noted 2/3/2021         Physical Exam  Temp:  [36.1 °C (96.9 °F)-36.6 °C (97.8 °F)] 36.1 °C (96.9 °F)  Pulse:  [47-81] 81  Resp:  [16-18] 18  BP: (110-143)/(63-88) 143/88  SpO2:  [92 %-100 %] 95 %    Physical Exam   Constitutional: He is oriented to person, place, and time. He appears well-developed and well-nourished. No distress.   HENT:   Head: Normocephalic and atraumatic.   Right Ear: External ear normal.   Left Ear: External ear normal.   Nose: Nose normal.   Mouth/Throat: Oropharynx is clear and moist.   Eyes: Right eye exhibits no discharge. Left eye exhibits no discharge. Right conjunctiva is not injected. Left conjunctiva is not injected. No scleral icterus. Right eye exhibits nystagmus. Right pupil is round. Left pupil is not reactive. Left pupil is round.   Neck: Normal range of motion. No JVD present. Carotid bruit is not " present. No tracheal deviation present. Thyroid mass present.       Cardiovascular: Normal rate, regular rhythm, normal heart sounds and intact distal pulses. Exam reveals no gallop and no friction rub.   No murmur heard.  Pulses:       Carotid pulses are 2+ on the right side and 2+ on the left side.       Radial pulses are 2+ on the right side and 2+ on the left side.        Femoral pulses are 2+ on the right side and 2+ on the left side.       Popliteal pulses are 2+ on the right side and 2+ on the left side.        Dorsalis pedis pulses are 2+ on the right side and 2+ on the left side.        Posterior tibial pulses are 2+ on the right side and 2+ on the left side.   Pulmonary/Chest: Effort normal and breath sounds normal. No stridor. No respiratory distress. He has no wheezes. He has no rales. He exhibits no tenderness.   Abdominal: Soft. Bowel sounds are normal. He exhibits no shifting dullness, no distension, no fluid wave, no ascites, no pulsatile midline mass and no mass. There is no hepatosplenomegaly. There is abdominal tenderness (abdominal tenderness in midepigastrum) in the epigastric area. There is no rigidity, no rebound, no guarding, no CVA tenderness and no tenderness at McBurney's point.   Musculoskeletal:         General: No tenderness, deformity or edema.        Arms:       Comments: Complete right sided loss of sensation.    Lymphadenopathy:     He has no cervical adenopathy.   Neurological: He is alert and oriented to person, place, and time. A cranial nerve deficit is present. He exhibits abnormal muscle tone.   Reflex Scores:       Bicep reflexes are 2+ on the right side and 2+ on the left side.       Patellar reflexes are 1+ on the right side and 2+ on the left side.       Achilles reflexes are 1. on the right side and 2+ on the left side.  CN 1,2, 3, 4,6 grossly intact. Left CN 2 non reactive. Pt has R 5, 7, 10, and 11 weakness. CN 8 grossly intact.    Skin: Skin is warm. No rash noted. No  erythema.   Psychiatric: His speech is delayed and slurred.   Fluids  Date 02/05/21 0700 - 02/06/21 0659   Shift 5119-4485 7182-5096 8178-7655 24 Hour Total   INTAKE   Shift Total       OUTPUT   Urine 1000   1000   Shift Total 1000   1000   Weight (kg) 135.1 135.1 135.1 135.1       Laboratory  Labs reviewed, pertinent labs below.  Recent Labs     02/03/21  1239 02/04/21  0242   WBC 8.1 8.5   RBC 4.69* 4.30*   HEMOGLOBIN 13.3* 12.6*   HEMATOCRIT 42.3 39.3*   MCV 90.2 91.4   MCH 28.4 29.3   MCHC 31.4* 32.1*   RDW 46.8 47.0   PLATELETCT 274 253   MPV 10.5 10.4     Recent Labs     02/03/21  1239 02/04/21  0242 02/05/21  0304   SODIUM 137 133* 136   POTASSIUM 3.8 3.5* 4.0   CHLORIDE 102 100 100   CO2 22 23 23   GLUCOSE 100* 180* 129*   BUN 26* 24* 23*   CREATININE 1.12 1.02 1.17   CALCIUM 9.3 8.4* 9.7     Recent Labs     02/03/21  1239   APTT 29.5   INR 0.96            URINALYSIS:  Lab Results   Component Value Date/Time    COLORURINE Yellow 02/03/2021 1616    CLARITY Clear 02/03/2021 1616    SPECGRAVITY 1.045 02/03/2021 1616    PHURINE 5.0 02/03/2021 1616    KETONES Trace (A) 02/03/2021 1616    PROTEINURIN Negative 02/03/2021 1616    BILIRUBINUR Negative 02/03/2021 1616    UROBILU 1.0 02/03/2021 1616    NITRITE Negative 02/03/2021 1616    LEUKESTERAS Negative 02/03/2021 1616    OCCULTBLOOD Negative 02/03/2021 1616     UPC  No results found for: TOTPROTUR No results found for: CREATININEU    Imaging reviewed  DX-CHEST-PORTABLE (1 VIEW)   Final Result      No acute cardiopulmonary abnormality.      CT-CTA NECK WITH & W/O-POST PROCESSING   Final Result      1.  No high-grade stenosis, large vessel occlusion, aneurysm or dissection.   2.  A 3.4 cm heterogeneously enhancing mass in the neck, at the level of the thyroid cartilage. It may represent an ectopic thyroid tissue/nodule. It has grown slightly since 2015 study. Further evaluation with ultrasound is advised on an outpatient    basis.      CT-CTA HEAD WITH & W/O-POST  PROCESS   Final Result      1.  CT angiogram of the [place] within normal limits.      2.  Abnormal low attenuation in the periventricular white matter again noted.      CT-HEAD W/O   Final Result      1.  No acute intracranial hemorrhage.      2.  Diffuse confluent periventricular hypodensity as on the prior MRI is consistent with extensive chronic demyelination or dysmyelination as described previously.         MR-BRAIN-WITH & W/O    (Results Pending)          Assessment/Plan  Right sided weakness              Vascular -  Ischemic stroke possible, given patients CT finding of no acute intracerebral hemorraghic stroke ischemia is most likely alternative.                           Plan - ischemic stroke workup, CT w/o and w/ contrast revealed no acute hemorrhage or high-grade stenosis, or large vessel occlusion, aneurysm or dissection. Rule out DVT with doppler study of lower extremities and echocardiogram with bubble study for patent foramen ovale. EKG for a-fib.  Allow for permissive hypertension and hold all antihypertensives at this time.                  Infectious - Pt, is diabetic which puts him at higher risk of infection. Cannot rule out infectious insult as a result of patient neurological deficits. Meningitis and encephalitis can present with headache and focal neurological deficits.                          Plan - LP with CSF analysis and gram stain                 Neoplastic - Neoplastic origin of neurological deficit remains a possibility, however is somewhat more unlikely given patients negative CT scan                          Plan - MRI                 Degenerative - Pt has history of recurrent trauma and head injury. Neurological defects are associated with worsening and degenerative changes of lipohylinosis or atheroma formation from vascular insults.                          Plan - continue statin therapy and antiplatelet therapy of Aspirin 81.                  Iatrogenic/Intoxication -  Pt has hx of recreational marijuana use, and while patient denies any other recreational drug use, acute hypoxemic brain injury is a possibility                          Plan - Urine toxicology                 Congenital - Pt has a family hx of migraines in his family on his mothers side. While the patient does not endorse any family hx of stroke, there is a possibility of AV malformations that could be present and causing vascular steal.                           Plan - MR A for abnormal flow                 Autoimmune -  Pt has white matter effacement on most recent imaging, and may represent progression of multiple sclerosis                          Plan - CSF analysis for oligoclonal bands and MRI for white matter effacement.                  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 Neurological deficits can be present as progression of subsequent brain injury.                           Plan - MRI                 Endocrine/Metabolic - Pt has history of DM, given patients history of DM, DKA or severe hypoglycemia is a possibility.                          Plan A1c, BMP       Psychiatric - Malingering, Patient denies any acute stressors however on attending examination of patient, it was noted that pt was able to use his right arm on his computer screen.    Plan - psych consult        Abdominal Discomfort              Vascular - Pt reports mid epigastric pain, can be concerning for mesenteric ischemia.                           Plan - ABD CT A                 Infectious - may represent infection of H. Pylori                           Plan - Urease breath test and fecal antigen for H. Pylori                 Neoplastic - may be stomach neoplastic growth                          Plan - Endoscopic exam per GI                            Degenerative - pt is obese, and may have thoracic nerve  compression manifesting as abdominal pain.                          Plan - MRI spine                 Inflammatory/Autoimmune - Gastroesophageal reflux disease, pt is overweight, which can be associated with GERD and the associated reflux to cause abdominal pain                          Plan - PPI therapy         Diabetes - continue metformin 1000 BID, and insulin regular 4x/day    Seizure disorder - continue divalproex, and topiramate 25 qd.     HTN - continue Lisinopril    Anxiety - started on sertraline 100 po qd, and buspirone 10mg PID.

## 2021-02-05 NOTE — THERAPY
Physical Therapy   Daily Treatment     Patient Name: Norm Prabhakar  Age:  40 y.o., Sex:  male  Medical Record #: [id number]  Today's Date: 2/5/2021     Precautions: Fall Risk    Assessment    Pt known to this therapist from previous hospital admissions, seen for PT tx session today. Pt conts to present with inconsistent R-sided deficits. Today he was able to perform STS, transfer, and amb x5ft fwd/back with CGA and FWW. Participated in heel slides in sitting for RLE mvt. With MMT he presents with 1-2/5 gross strength however demonstrates at least 3/5 in knee extension with WB in standing. No knee buckling noted throughout gait. Very poor R step phase of gait dragging LE during phase however had adequate R hip flexor strength to flex limb to place on washcloth for heel slide exercises. PT to cont to progress mobility.     Plan    Continue current treatment plan.    DC Equipment Recommendations: Unable to determine at this time  Discharge Recommendations: Recommend post-acute placement for additional physical therapy services prior to discharge home     Objective     02/05/21 1201   Cognition    Speech/ Communication Delayed Responses;Slurred   Level of Consciousness Alert   Initiation Impaired   Comments pleasant and cooperative, stuttering speech intermittent when not distracted by conversation, inconsistent presentation   Active ROM Lower Body    Comments AROM conts to  be grossly limited however improve with fxnl vs strength assessment   Strength Lower Body   Comments MMT revealed 1-2/5 RLE grossly however at least 3/5 in knee extension with ability to tolerate WB   Sensation Lower Body   Comments reports diminished sensation RLE   Lower Body Muscle Tone   Lower Body Muscle Tone  WDL   Sitting Lower Body Exercises   Comments heel slides x10 each   Balance   Sitting Balance (Static) Good   Sitting Balance (Dynamic) Good   Standing Balance (Static) Fair -   Standing Balance (Dynamic) Fair -   Gait Analysis   Gait  Level Of Assist (CGA)   Assistive Device Front Wheel Walker   Distance (Feet) 5   # of Times Distance was Traveled 2 (1 fwd, 1 back)   Deviation Increased Base Of Support;Decreased Heel Strike;Decreased Toe Off;Shuffled Gait;Bradykinetic  (no lift off of RLE for swing phase)   # of Stairs Climbed 0   Weight Bearing Status No restrictions   Comments Able to maintain R hand on FWW during gait, poor hip flexor strength to advance RLE despite having 3/5 with cues to flex in sitting, no knee buckling noted   Bed Mobility    Comments NT - up in chair pre/post session   Functional Mobility   Bed, Chair, Wheelchair Transfer (CGA)   Short Term Goals    Short Term Goal # 1 Patient will be able to demonstrate bed mobility SPV in 6 visits in order to incrs fxnl ind   Goal Outcome # 1 (Not addressed today, pt up in chair pre/post)   Short Term Goal # 2 Patient will be able to perform transfers SPV in 6 visits in order to incrs fxnl ind   Goal Outcome # 2 Progressing as expected   Short Term Goal # 3 Patient will be able to perform amb x200' SPV in 6 visits in order to incrs fxnl ind   Goal Outcome # 3 Progressing slower than expected   Short Term Goal # 4 Patient will be able to negotiate x10 stairs with SPV in 6 visits in order to incrs fxnal ind   Goal Outcome # 4 Progressing slower than expected

## 2021-02-05 NOTE — PROGRESS NOTES
Chief Complaint   Patient presents with   • Weakness       Problem List Items Addressed This Visit     None      Visit Diagnoses     Weakness of right upper extremity        Relevant Orders    REFERRAL TO PHYSIATRY (PMR)    REFERRAL TO NEUROLOGY    Weakness of right lower extremity            Neurology Progress Note    History of present illness:  This is a 40-year old male, well known to [facility] (most recent hospitalization last week) with an extensive PMhx most significant for anxiety/deoression, Bipolar, developmental delay, seizure disorder (On VPA and Topiramate) Type II diabetes mellitus, and hypothyroid who again presented to [facility] On 2/3/21 for a chief complaint of sudden onset Right sided weakness/paresthesia. The patient reports that he felt to be in his usual state of health this morning; at 1130, patient suddenly noted RUE/RLE weakness and associated progressive numbness/tingling sensation. He thus called EMS and was brought to the ED. Here, SBP 120s; BG WNL. CT Head unremarkable; CTA head/neck with no LVO. Patient ultimately determined not to be a candidate for IV tPA secondary to low suspicion for acute ischemic stroke, higher suspicion for break through seizure versus functional etiology (patient uncooperative with exam, was then witnessed to spontaneously/voluntarily move RUE; also with stuttered speech-- a common functional exam finding).   Currently, patient is lying in stretcher; awake and alert. Continues to admit to the above noted symptoms. He admits to mild headache. Denies dizziness. Denies recent fall or trauma.     Neurology has been consulted by Dr. Jayshree Neely to further evaluate the findings noted above.     Interval, 2/4/21:   Patient sitting up in chair, awake, alert. Speech remains stuttered intermittently, appears distractible. Patient admits to persistent RUE/RLE flaccidity. Awaiting MRI today. No events overnight.     Interval, 2/5/21:   Patient sitting up in bed;  on his laptop computer (using both LUE/RUE). Admits to persistent Right sided flaccidity despite this. Awaiting imaging. No events overnight.     No changes to HPI as ws previously documented.     Past medical history:   Past Medical History:   Diagnosis Date   • Anxiety     BIPOLAR   • ASTHMA    • Bipolar 1 disorder ([facility])    • Depression    • Diabetes ([facility])     Type II Diabetes   • Fall     passed out 2 wks ago   • Glaucoma    • Glaucoma     both eyes/ blind on left eye   • Hypothyroidism    • Indigestion     once in a while   • Mental disorder     learning disabilities; speech impairment; developmental delays   • Murmur     since birth   • Pneumonia     remote   • Psychiatric problem     PTSD   • S/P thyroidectomy    • Seizure ([facility])    • Seizure disorder ([facility])    • Unspecified disorder of thyroid        Past surgical history:   Past Surgical History:   Procedure Laterality Date   • EYE SURGERY     • OTHER      Hernia Repair when he was 10 yrs old   • THYROID LOBECTOMY         Family history:   Family History   Problem Relation Age of Onset   • Hypertension Mother    • Heart Disease Mother    • Lung Disease Mother    • Stroke Maternal Grandmother        Social history:   Social History     Socioeconomic History   • Marital status: Single     Spouse name: Not on file   • Number of children: Not on file   • Years of education: Not on file   • Highest education level: Not on file   Occupational History   • Not on file   Social Needs   • Financial resource strain: Somewhat hard   • Food insecurity     Worry: Sometimes true     Inability: Sometimes true   • Transportation needs     Medical: No     Non-medical: No   Tobacco Use   • Smoking status: Current Every Day Smoker     Packs/day: 0.25     Types: Cigarettes, Cigars     Last attempt to quit: 2020     Years since quittin.6   • Smokeless tobacco: Never Used   • Tobacco comment: 3 cigarettes a day   Substance and Sexual Activity   • Alcohol use: Not  Currently     Frequency: 2-3 times a week   • Drug use: Yes     Types: Inhaled     Comment: marijuana   • Sexual activity: Not on file   Lifestyle   • Physical activity     Days per week: Not on file     Minutes per session: Not on file   • Stress: Not on file   Relationships   • Social connections     Talks on phone: Not on file     Gets together: Not on file     Attends Anabaptism service: Not on file     Active member of club or organization: Not on file     Attends meetings of clubs or organizations: Not on file     Relationship status: Not on file   • Intimate partner violence     Fear of current or ex partner: Not on file     Emotionally abused: Not on file     Physically abused: Not on file     Forced sexual activity: Not on file   Other Topics Concern   • Not on file   Social History Narrative   • Not on file       Current medications:   Current Facility-Administered Medications   Medication Dose   • topiramate (TOPAMAX) tablet 25 mg  25 mg   • sertraline (Zoloft) tablet 100 mg  100 mg   • prazosin (MINIPRESS) capsule 4 mg  4 mg   • metFORMIN (GLUCOPHAGE) tablet 1,000 mg  1,000 mg   • lurasidone (LATUDA) tablet 20 mg  20 mg   • lisinopril (PRINIVIL) tablet 10 mg  10 mg   • montelukast (SINGULAIR) tablet 10 mg  10 mg   • fenofibrate micronized (LOFIBRA) capsule 134 mg  134 mg   • vitamin D (cholecalciferol) tablet 4,000 Units  4,000 Units   • divalproex (DEPAKOTE) delayed-release tablet 500 mg  500 mg    And   • divalproex (DEPAKOTE) delayed-release tablet 1,500 mg  1,500 mg   • insulin regular (HumuLIN R,NovoLIN R) injection  1-6 Units    And   • glucose 4 g chewable tablet 16 g  16 g    And   • dextrose 50% (D50W) injection 50 mL  50 mL   • aspirin EC (ECOTRIN) tablet 81 mg  81 mg   • atorvastatin (LIPITOR) tablet 80 mg  80 mg   • budesonide-formoterol (SYMBICORT) 160-4.5 MCG/ACT inhaler 2 Puff  2 Puff   • busPIRone (BUSPAR) tablet 10 mg  10 mg   • famotidine (PEPCID) tablet 10 mg  10 mg   • levothyroxine  "(SYNTHROID) tablet 225 mcg  225 mcg   • traZODone (DESYREL) tablet 100 mg  100 mg   • Pharmacy consult request - Allow for permissive hypertension: SBP up to 220 mmHg/DBP up to 120 mmHg x 48 hours     • senna-docusate (PERICOLACE or SENOKOT S) 8.6-50 MG per tablet 2 Tab  2 Tab    And   • polyethylene glycol/lytes (MIRALAX) PACKET 1 Packet  1 Packet    And   • magnesium hydroxide (MILK OF MAGNESIA) suspension 30 mL  30 mL    And   • bisacodyl (DULCOLAX) suppository 10 mg  10 mg   • enoxaparin (LOVENOX) inj 40 mg  40 mg   • acetaminophen (Tylenol) tablet 650 mg  650 mg   • ondansetron (ZOFRAN) syringe/vial injection 4 mg  4 mg   • ondansetron (ZOFRAN ODT) dispertab 4 mg  4 mg   • promethazine (PHENERGAN) tablet 12.5-25 mg  12.5-25 mg   • promethazine (PHENERGAN) suppository 12.5-25 mg  12.5-25 mg   • prochlorperazine (COMPAZINE) injection 5-10 mg  5-10 mg   • LORazepam (ATIVAN) injection 4 mg  4 mg   • omeprazole (PRILOSEC) capsule 20 mg  20 mg       Medication Allergy:  Allergies   Allergen Reactions   • Geodon [Ziprasidone Hcl] Anaphylaxis     Anaphylaxis per patient   • Abilify      \"Feeling tired, like I don't even know whats going on around me\"   • Fish      Pt reports fish causes him to be sick to his stomach  Not listed on MAR noted 2/3/2021         Review of systems:   Constitutional: denies fever, night sweats, weight loss.   Eyes: denies acute vision change, eye pain or secretion.   Ears, Nose, Mouth, Throat: denies nasal secretion, nasal bleeding, difficulty swallowing, hearing loss, tinnitus, vertigo, ear pain, acute dental problems, oral ulcers or lesions.   Endocrine: denies recent weight changes, heat or cold intolerance, polyuria, polydypsia, polyphagia,abnormal hair growth.  Cardiovascular: denies new onset of chest pain, palpitations, syncope, or dyspnea of exertion.  Pulmonary: denies shortness of breath, new onset of cough, hemoptysis, wheezing, chest pain or flu-like symptoms.   GI: denies nausea, " vomiting, diarrhea, GI bleeding, change in appetite, abdominal pain, and change in bowel habits.  : denies dysuria, urinary incontinence, hematuria.  Heme/oncology: denies history of easy bruising or bleeding. No history of cancer, DVTor PE.  Allergy/immunology: denies hives/urticaria, or itching.   Dermatologic: denies new rash, or new skin lesions.  Musculoskeletal:denies joint swelling or pain, muscle pain, neck and back pain.   Neurologic: As noted above.   Psychiatric: denies symptoms of depression, anxiety, hallucinations, mood swings or changes, suicidal or homicidal thoughts.     Physical examination:   Vitals:    02/04/21 2324 02/05/21 0400 02/05/21 0812 02/05/21 0820   BP: 116/63 112/73 126/78 117/69   Pulse: (!) 47 (!) 50 62 (!) 55   Resp: 16 16 16 18   Temp: 36.3 °C (97.3 °F) 36.3 °C (97.3 °F) 36.6 °C (97.8 °F) 36.2 °C (97.2 °F)   TempSrc: Temporal Temporal Temporal Temporal   SpO2: 92% 93% 100% 96%   Weight:         General: Patient in no acute distress, pleasant and cooperative.  HEENT: Normocephalic, no signs of acute trauma.   Neck: supple, no meningeal signs or carotid bruits. There is normal range of motion. No tenderness on exam.   Chest: clear to auscultation. No cough.   CV: RRR, no murmurs.   Skin: no signs of acute rashes or trauma.   Musculoskeletal: joints exhibit full range of motion, without any pain to palpation. There are no signs of joint or muscle swelling. There is no tenderness to deep palpation of muscles.   Psychiatric: No hallucinatory behavior. Denies symptoms of depression or suicidal ideation. Mood and affect appear normal on exam.     NEUROLOGICAL EXAM:   Mental status, orientation: Awake, alert and fully oriented.   Speech and language: speech is stuttered; non aphasic, non dysarthric. The patient is able to name, repeat and comprehend.   Cranial nerve exam: Pupils are 3-4 mm bilaterally and equally reactive to light. Visual fields are intact by confrontation on the Right;  patient is blind in the Left eye (chronic/baseline). There is no nystagmus on primary or secondary gaze. Intact full EOM in all directions of gaze. Face appears symmetric. Sensation in the face is intact to light touch. Uvula is midline. Palate elevates symmetrically. Tongue is midline and without any signs of tongue biting or fasciculations Shoulder shrug is intact bilaterally.   Motor exam: Strength is 5/5 in LUE/LLE. 0/5 to RUE/RLE with positive Romero's. Tone is normal. No abnormal movements were seen on exam.   Sensory exam Decreased to light touch and nox stim to RUE/RLE. Otherwise normal sensation.   Deep tendon reflexes:  Plantar responses are flexor. There is no clonus.   Coordination: shows a normal finger-nose-finger on the left; patient does not participate on the Right..   Gait: Not assessed at this time as patient is a fall risk.     No changes to exam as was documented on 2/3/21.       ANCILLARY DATA REVIEWED:     Lab Data Review:  Recent Results (from the past 24 hour(s))   ACCU-CHEK GLUCOSE    Collection Time: 02/04/21 12:40 PM   Result Value Ref Range    Glucose - Accu-Ck 135 (H) 65 - 99 mg/dL   ACCU-CHEK GLUCOSE    Collection Time: 02/04/21  5:05 PM   Result Value Ref Range    Glucose - Accu-Ck 142 (H) 65 - 99 mg/dL   ACCU-CHEK GLUCOSE    Collection Time: 02/04/21  8:19 PM   Result Value Ref Range    Glucose - Accu-Ck 161 (H) 65 - 99 mg/dL   MAGNESIUM    Collection Time: 02/05/21  3:04 AM   Result Value Ref Range    Magnesium 2.1 1.5 - 2.5 mg/dL   Basic Metabolic Panel    Collection Time: 02/05/21  3:04 AM   Result Value Ref Range    Sodium 136 135 - 145 mmol/L    Potassium 4.0 3.6 - 5.5 mmol/L    Chloride 100 96 - 112 mmol/L    Co2 23 20 - 33 mmol/L    Glucose 129 (H) 65 - 99 mg/dL    Bun 23 (H) 8 - 22 mg/dL    Creatinine 1.17 0.50 - 1.40 mg/dL    Calcium 9.7 8.5 - 10.5 mg/dL    Anion Gap 13.0 7.0 - 16.0   ESTIMATED GFR    Collection Time: 02/05/21  3:04 AM   Result Value Ref Range    GFR If  African American >60 >60 mL/min/1.73 m 2    GFR If Non African American >60 >60 mL/min/1.73 m 2   ACCU-CHEK GLUCOSE    Collection Time: 02/05/21  9:08 AM   Result Value Ref Range    Glucose - Accu-Ck 131 (H) 65 - 99 mg/dL       Labs reviewed by me.     Records reviewed:   Reviewed records from patient's previous encounters at [facility].       Imaging reviewed by me:     DX-CHEST-PORTABLE (1 VIEW)   Final Result      No acute cardiopulmonary abnormality.      CT-CTA NECK WITH & W/O-POST PROCESSING   Final Result      1.  No high-grade stenosis, large vessel occlusion, aneurysm or dissection.   2.  A 3.4 cm heterogeneously enhancing mass in the neck, at the level of the thyroid cartilage. It may represent an ectopic thyroid tissue/nodule. It has grown slightly since 2015 study. Further evaluation with ultrasound is advised on an outpatient    basis.      CT-CTA HEAD WITH & W/O-POST PROCESS   Final Result      1.  CT angiogram of the [place] within normal limits.      2.  Abnormal low attenuation in the periventricular white matter again noted.      CT-HEAD W/O   Final Result      1.  No acute intracranial hemorrhage.      2.  Diffuse confluent periventricular hypodensity as on the prior MRI is consistent with extensive chronic demyelination or dysmyelination as described previously.             Modified Merrick Scale (MRS): 1 = No significant disability, despite symptoms; able to perform all usual duties and activities      ASSESSMENT AND PLAN:  40-year old male, well known to [facility] (most recent hospitalization last week) with an extensive PMhx most significant for anxiety/deoression, Bipolar, developmental delay, seizure disorder (On VPA and Topiramate) Type II diabetes mellitus, and hypothyroid who again presented to [facility] On 2/3/21 for a chief complaint of sudden onset Right sided weakness/paresthesia; CT head with no acute intracranial abnormality; CTA head/neck with no LVO. Patient ultimately  determined not to be a candidate for IV tPA secondary to low suspicion for acute ischemic stroke, higher suspicion for break through seizure versus functional/psychogenic etiology (patient uncooperative with exam, was then witnessed to spontaneously/voluntarily move RUE; also with stuttered speech-- a common functional exam finding). Exam remains unchanged; stuttered speech appears to be distractible, RUE/RLE weakness also distractible urther supporting non-organic nature of symptoms.     Recommendations/Plan:     -q4h and PRN neuro assessment. VS per nursing/unit protocol. BP goal < 140/90.   -Obtain MRI Brain w/wo contrast--At this juncture, will pursue repeat CT head. If repeat CT head is unremarkable, neurology will sign off and patient is clear for discharge.   -Check VPA, Topiramate levels; UA, UDS, ETOH, B12, Folate, TSH, RPR-- Note elevated TSH at 48.600, low T4. Will defer management to primary.   -Perform med rec given Polypharmacy.   -All other medical management per primary.     The plan of care above has been discussed with Dr. Mccoy. Other than the above, no further recommendations from a neurological perspective; please call with questions.     Trinity Corado, [email or web address].  [facility]

## 2021-02-05 NOTE — THERAPY
Occupational Therapy  Daily Treatment     Patient Name: Norm Prabhakar  Age:  40 y.o., Sex:  male  Medical Record #: [id number]  Today's Date: 2/5/2021     Precautions  Precautions: Fall Risk  Comments: R side deficits, hx conversion disorder    Assessment    Making progress, continues to demonstrate inconsistencies with formal testing vs. Functional abilities and observation.    Plan    Continue current treatment plan.    DC Equipment Recommendations: Unable to determine at this time  Discharge Recommendations: Recommend post-acute placement for additional occupational therapy services prior to discharge home    Subjective    Pleasant and cooperative     Objective       02/05/21 1105   Cognition    Cognition / Consciousness X   Speech/ Communication Delayed Responses   Level of Consciousness Alert   Initiation Impaired   Comments pleasant and cooperative, stuttering speech, remains with inconsistent presentation   Active ROM Upper Body   Active ROM Upper Body  X   Comments no AROM with formal testing again, however with VERY slight a/arom pt able to achieve all planes RUE   Sitting Upper Body Exercises   Sitting Upper Body Exercises Yes   Comments A/AROM RUE in all planes   Balance   Sitting Balance (Static) Good   Sitting Balance (Dynamic) Fair +   Standing Balance (Static) Fair -   Standing Balance (Dynamic) Fair -   Weight Shift Sitting Fair   Weight Shift Standing Fair   Comments w/ fww   Bed Mobility    Supine to Sit Supervised   Scooting Supervised   Activities of Daily Living   Grooming Supervision;Seated  (oral care at sink, primarily using L hand)   Lower Body Dressing Minimal Assist  (don pajama pants)   Comments able to use R hand to hold onto pants to thread BLE. Able to help hike over hips with R hand. Able to hold toothpaste down with index finger while L hand unscrewed cap   Functional Mobility   Sit to Stand Minimal Assist   Bed, Chair, Wheelchair Transfer Minimal Assist   Mobility EOB>sink>Chair    Comments w/ FWW   Patient / Family Goals   Patient / Family Goal #1 to go home   Goal #1 Outcome Goal not met   Short Term Goals   Short Term Goal # 1 pt will demo toilet txf with supv   Goal Outcome # 1 Progressing as expected   Short Term Goal # 2 pt will dress LB with supv   Goal Outcome # 2 Progressing as expected   Short Term Goal # 3 pt will dress UB with supv   Goal Outcome # 3 Progressing as expected   Short Term Goal # 4 pt will groom in stance w/ supv   Goal Outcome # 4 Progressing as expected

## 2021-02-05 NOTE — PROGRESS NOTES
Monitor summary: SB 47-58, AL 0.20, QRS 0.12, QT 0.44, with no ectopy per strip from monitor room.

## 2021-02-05 NOTE — DISCHARGE PLANNING
PMR completed.   MRI pending to confirm if pt has a dx to support in pt rehab.     TCC will follow.

## 2021-02-05 NOTE — DISCHARGE PLANNING
Lsw emailed PFA to get clarification on pt's insurance.     ADDENDUM: Pt does NOT have insurance during this admission. Pt did not call to renew insurance and will have to re-apply. PFA submitted new application.

## 2021-02-06 ENCOUNTER — APPOINTMENT (OUTPATIENT)
Dept: RADIOLOGY | Facility: MEDICAL CENTER | Age: 41
End: 2021-02-06
Attending: PSYCHIATRY & NEUROLOGY
Payer: MEDICAID

## 2021-02-06 PROBLEM — F44.9 CONVERSION DISORDER: Status: ACTIVE | Noted: 2021-02-06

## 2021-02-06 LAB
GLUCOSE BLD-MCNC: 132 MG/DL (ref 65–99)
GLUCOSE BLD-MCNC: 134 MG/DL (ref 65–99)
GLUCOSE BLD-MCNC: 184 MG/DL (ref 65–99)
TOPIRAMATE SERPL-MCNC: <1.5 UG/ML (ref 5–20)

## 2021-02-06 PROCEDURE — 96372 THER/PROPH/DIAG INJ SC/IM: CPT | Mod: XU

## 2021-02-06 PROCEDURE — 700102 HCHG RX REV CODE 250 W/ 637 OVERRIDE(OP): Performed by: INTERNAL MEDICINE

## 2021-02-06 PROCEDURE — 700102 HCHG RX REV CODE 250 W/ 637 OVERRIDE(OP): Performed by: HOSPITALIST

## 2021-02-06 PROCEDURE — 700117 HCHG RX CONTRAST REV CODE 255: Performed by: PSYCHIATRY & NEUROLOGY

## 2021-02-06 PROCEDURE — A9576 INJ PROHANCE MULTIPACK: HCPCS | Performed by: PSYCHIATRY & NEUROLOGY

## 2021-02-06 PROCEDURE — 82962 GLUCOSE BLOOD TEST: CPT | Mod: 91

## 2021-02-06 PROCEDURE — 99226 PR SUBSEQUENT OBSERVATION CARE,LEVEL III: CPT | Performed by: HOSPITALIST

## 2021-02-06 PROCEDURE — 70553 MRI BRAIN STEM W/O & W/DYE: CPT

## 2021-02-06 PROCEDURE — A9270 NON-COVERED ITEM OR SERVICE: HCPCS | Performed by: INTERNAL MEDICINE

## 2021-02-06 PROCEDURE — 700111 HCHG RX REV CODE 636 W/ 250 OVERRIDE (IP): Performed by: INTERNAL MEDICINE

## 2021-02-06 PROCEDURE — A9270 NON-COVERED ITEM OR SERVICE: HCPCS | Performed by: HOSPITALIST

## 2021-02-06 PROCEDURE — G0378 HOSPITAL OBSERVATION PER HR: HCPCS

## 2021-02-06 RX ADMIN — METFORMIN HYDROCHLORIDE 1000 MG: 500 TABLET ORAL at 08:01

## 2021-02-06 RX ADMIN — OMEPRAZOLE 20 MG: 20 CAPSULE, DELAYED RELEASE ORAL at 05:22

## 2021-02-06 RX ADMIN — PRAZOSIN HYDROCHLORIDE 4 MG: 2 CAPSULE ORAL at 20:32

## 2021-02-06 RX ADMIN — FAMOTIDINE 10 MG: 20 TABLET ORAL at 05:23

## 2021-02-06 RX ADMIN — TOPIRAMATE 25 MG: 25 TABLET, FILM COATED ORAL at 05:22

## 2021-02-06 RX ADMIN — FAMOTIDINE 10 MG: 20 TABLET ORAL at 17:28

## 2021-02-06 RX ADMIN — BUDESONIDE AND FORMOTEROL FUMARATE DIHYDRATE 2 PUFF: 160; 4.5 AEROSOL RESPIRATORY (INHALATION) at 17:28

## 2021-02-06 RX ADMIN — LEVOTHYROXINE SODIUM 225 MCG: 0.2 TABLET ORAL at 06:00

## 2021-02-06 RX ADMIN — ENOXAPARIN SODIUM 40 MG: 40 INJECTION SUBCUTANEOUS at 17:29

## 2021-02-06 RX ADMIN — BUSPIRONE HYDROCHLORIDE 10 MG: 10 TABLET ORAL at 17:20

## 2021-02-06 RX ADMIN — ASPIRIN 81 MG: 81 TABLET, COATED ORAL at 05:22

## 2021-02-06 RX ADMIN — FENOFIBRATE 134 MG: 134 CAPSULE ORAL at 17:25

## 2021-02-06 RX ADMIN — INSULIN HUMAN 1 UNITS: 100 INJECTION, SOLUTION PARENTERAL at 20:33

## 2021-02-06 RX ADMIN — MONTELUKAST 10 MG: 10 TABLET, FILM COATED ORAL at 20:33

## 2021-02-06 RX ADMIN — SERTRALINE HYDROCHLORIDE 100 MG: 100 TABLET ORAL at 05:22

## 2021-02-06 RX ADMIN — INSULIN HUMAN 1 UNITS: 100 INJECTION, SOLUTION PARENTERAL at 11:50

## 2021-02-06 RX ADMIN — BUSPIRONE HYDROCHLORIDE 10 MG: 10 TABLET ORAL at 05:22

## 2021-02-06 RX ADMIN — ATORVASTATIN CALCIUM 80 MG: 80 TABLET, FILM COATED ORAL at 17:25

## 2021-02-06 RX ADMIN — Medication 4000 UNITS: at 20:33

## 2021-02-06 RX ADMIN — BUSPIRONE HYDROCHLORIDE 10 MG: 10 TABLET ORAL at 11:42

## 2021-02-06 RX ADMIN — DIVALPROEX SODIUM 500 MG: 500 TABLET, DELAYED RELEASE ORAL at 05:22

## 2021-02-06 RX ADMIN — LURASIDONE HYDROCHLORIDE 20 MG: 20 TABLET, FILM COATED ORAL at 17:36

## 2021-02-06 RX ADMIN — GADOTERIDOL 20 ML: 279.3 INJECTION, SOLUTION INTRAVENOUS at 11:11

## 2021-02-06 RX ADMIN — DIVALPROEX SODIUM 1500 MG: 500 TABLET, DELAYED RELEASE ORAL at 17:21

## 2021-02-06 RX ADMIN — METFORMIN HYDROCHLORIDE 1000 MG: 500 TABLET ORAL at 17:25

## 2021-02-06 ASSESSMENT — ENCOUNTER SYMPTOMS
EYES NEGATIVE: 1
DEPRESSION: 0
HEADACHES: 1
BLURRED VISION: 0
RESPIRATORY NEGATIVE: 1
COUGH: 0
SHORTNESS OF BREATH: 0
PALPITATIONS: 0
FOCAL WEAKNESS: 1
NAUSEA: 0
WEAKNESS: 1
CARDIOVASCULAR NEGATIVE: 1
CHILLS: 0
CONSTITUTIONAL NEGATIVE: 1
GASTROINTESTINAL NEGATIVE: 1
MYALGIAS: 0
SORE THROAT: 0
ABDOMINAL PAIN: 0
FEVER: 0
DIZZINESS: 0
MUSCULOSKELETAL NEGATIVE: 1
VOMITING: 0
DIAPHORESIS: 0
BRUISES/BLEEDS EASILY: 0
WEIGHT LOSS: 0
SENSORY CHANGE: 1
PSYCHIATRIC NEGATIVE: 1

## 2021-02-06 ASSESSMENT — FIBROSIS 4 INDEX: FIB4 SCORE: 0.53

## 2021-02-06 ASSESSMENT — PAIN DESCRIPTION - PAIN TYPE: TYPE: ACUTE PAIN

## 2021-02-06 ASSESSMENT — LIFESTYLE VARIABLES: SUBSTANCE_ABUSE: 0

## 2021-02-06 NOTE — PROGRESS NOTES
Monitor summary: SB/SR 54-72, PA 0.16, QRS 0.10, QT 0.38, with rare PVCs per strip from monitor room.

## 2021-02-06 NOTE — CARE PLAN
Problem: Communication  Goal: The ability to communicate needs accurately and effectively will improve  Outcome: PROGRESSING AS EXPECTED  Note: Pt A&OX4, Q4 neuro Checks in place. Follows commands and responds appropriately, will continue to round hourly, encourage the Pt to ask questions and voice feelings.       Problem: Safety  Goal: Will remain free from injury  Outcome: PROGRESSING AS EXPECTED  Note: PT educated to call for assistance, Non-skid socks, bed alarm, call light provided. Will continue to monitor Q1hr rounding. Q4 Neuro check per stroke protocol.

## 2021-02-06 NOTE — PROGRESS NOTES
"Hospital Medicine Daily Progress Note    Date of Service  2/6/2021    Chief Complaint  40 y.o. male admitted 2/3/2021 with weakness    Hospital Course  40 y.o. male with past medical history of seizure disorder, psychiatric disorder, essential hypertension, diabetes mellitus who presented 2/3/2021 with right-sided weakness started around 1130 this morning.  Associated with tingling numbness sensation.  The patient has similar hospitalization in the past.  In the ER he has CT scan of the head was done and showed no acute finding.  Neurology was consulted and recommended the patient is not a TPA candidate and suspect that his symptom is more likely related to seizure.    Interval Problem Update  His speech is slightly stuttering but not a classic stuttering pattern - it is inconsistent may be partially intentional.  He inially told me \"I can just barely move today\", I responded that \"barely\" is better than he had previously reported so this was good news.  He also told me \"I spoke with my fiance (in [place]) and she convinced me that I would like to go to a snf after all.\" I informed him that since he allowed his insurance to lapse it is not an option.  I also discussed with him that if he is unable to do stairs he cannot return to his previous group home and we would have to find one without stairs to which he replied \"acutally I am doing a lot better and I think I will be able to do the stairs\" he then on focal exam demonstrated 4/5 strength on the right.  MRI still pending, and PT eval on stairs pending.  Discussed with neuro, nursing and case mgt.    Consultants/Specialty  Neurology    Code Status  Full Code    Disposition  tbd    Review of Systems  Review of Systems   Constitutional: Negative.  Negative for chills, diaphoresis, fever, malaise/fatigue and weight loss.   HENT: Negative.  Negative for sore throat.    Eyes: Negative.  Negative for blurred vision.   Respiratory: Negative.  Negative for cough and " shortness of breath.    Cardiovascular: Negative.  Negative for chest pain, palpitations and leg swelling.   Gastrointestinal: Negative.  Negative for abdominal pain, nausea and vomiting.   Genitourinary: Negative.  Negative for dysuria.   Musculoskeletal: Negative.  Negative for myalgias.   Skin: Negative.  Negative for itching and rash.   Neurological: Positive for sensory change, focal weakness, weakness and headaches. Negative for dizziness.   Endo/Heme/Allergies: Negative.  Does not bruise/bleed easily.   Psychiatric/Behavioral: Negative.  Negative for depression, substance abuse and suicidal ideas.   All other systems reviewed and are negative.       Physical Exam  Temp:  [36.3 °C (97.4 °F)-37.4 °C (99.3 °F)] 36.6 °C (97.9 °F)  Pulse:  [58-77] 77  Resp:  [15-20] 16  BP: (101-120)/(42-72) 120/65  SpO2:  [94 %-96 %] 94 %    Physical Exam  Vitals signs and nursing note reviewed. Exam conducted with a chaperone present.   Constitutional:       General: He is not in acute distress.     Appearance: Normal appearance. He is not diaphoretic.   HENT:      Head: Normocephalic.      Nose: Nose normal.      Mouth/Throat:      Mouth: Mucous membranes are moist.   Eyes:      Pupils: Pupils are equal, round, and reactive to light.      Comments: L eye chronic changes and blindness   Cardiovascular:      Rate and Rhythm: Normal rate and regular rhythm.      Pulses: Normal pulses.      Heart sounds: Normal heart sounds.   Pulmonary:      Effort: Pulmonary effort is normal.      Breath sounds: Normal breath sounds.   Abdominal:      General: Abdomen is flat. Bowel sounds are normal.      Palpations: Abdomen is soft.   Musculoskeletal: Normal range of motion.         General: No swelling or deformity.   Skin:     General: Skin is warm and dry.      Capillary Refill: Capillary refill takes less than 2 seconds.   Neurological:      General: No focal deficit present.      Mental Status: He is alert and oriented to person, place,  and time.      Cranial Nerves: No cranial nerve deficit.      Sensory: Sensory deficit present.      Motor: Weakness present.      Comments: Exam remains inconsistent, see above, left 5/5, right varies, witnessed pt overcome gravity with both left arm and leg     Psychiatric:         Mood and Affect: Mood normal.         Behavior: Behavior normal.         Fluids    Intake/Output Summary (Last 24 hours) at 2/6/2021 1342  Last data filed at 2/6/2021 1100  Gross per 24 hour   Intake --   Output 1150 ml   Net -1150 ml       Laboratory  Recent Labs     02/04/21  0242   WBC 8.5   RBC 4.30*   HEMOGLOBIN 12.6*   HEMATOCRIT 39.3*   MCV 91.4   MCH 29.3   MCHC 32.1*   RDW 47.0   PLATELETCT 253   MPV 10.4     Recent Labs     02/04/21  0242 02/05/21  0304   SODIUM 133* 136   POTASSIUM 3.5* 4.0   CHLORIDE 100 100   CO2 23 23   GLUCOSE 180* 129*   BUN 24* 23*   CREATININE 1.02 1.17   CALCIUM 8.4* 9.7             Recent Labs     02/04/21  0242   TRIGLYCERIDE 306*   HDL 31*   LDL 88       Imaging  MR-BRAIN-WITH & W/O   Final Result      1.  Unchanged severe supratentorial leukoencephalopathy. Seen on prior exams dating back to 2015.   2.  Mild cerebral atrophy.   3.  No acute findings.      DX-CHEST-PORTABLE (1 VIEW)   Final Result      No acute cardiopulmonary abnormality.      CT-CTA NECK WITH & W/O-POST PROCESSING   Final Result      1.  No high-grade stenosis, large vessel occlusion, aneurysm or dissection.   2.  A 3.4 cm heterogeneously enhancing mass in the neck, at the level of the thyroid cartilage. It may represent an ectopic thyroid tissue/nodule. It has grown slightly since 2015 study. Further evaluation with ultrasound is advised on an outpatient    basis.      CT-CTA HEAD WITH & W/O-POST PROCESS   Final Result      1.  CT angiogram of the [place] [facility] [place] within normal limits.      2.  Abnormal low attenuation in the periventricular white matter again noted.      CT-HEAD W/O   Final Result      1.  No acute intracranial  hemorrhage.      2.  Diffuse confluent periventricular hypodensity as on the prior MRI is consistent with extensive chronic demyelination or dysmyelination as described previously.              Assessment/Plan  ANA (acute kidney injury) (HCC)- (present on admission)  Assessment & Plan  In baseline range    Acute right-sided weakness- (present on admission)  Assessment & Plan  CT scan of the head and CT angiogram head and neck showed no acute finding  See above  On aspirin and statin  Exam remains inconsistent  Neurology following   EEG is abnormal - anti seizure meds restarted, unclear why they were stopped on admit - discussed with neuro and the meds being held on admit are the likely etiology - recommendation to continue current regimen  MRI still pending  Continue to follow  Continue PT/OT      Seizure disorder (HCC)- (present on admission)  Assessment & Plan  Seizure precaution, aspiration precaution  Eeg abnormal  Neurology following  Restart Depakote and Topamax    Type 2 diabetes mellitus with hyperglycemia, without long-term current use of insulin ([facility])- (present on admission)  Assessment & Plan  Sliding scale insulin and hypoglycemic protocol    Hypokalemia  Assessment & Plan  resolved    Type 2 diabetes mellitus without complication, without long-term current use of insulin ([facility])- (present on admission)  Assessment & Plan  A1c 9  SSI and hypoglycemic protocol    Psychiatric problem- (present on admission)  Assessment & Plan  Unclear if psychologic stress is contributing to his sx given inconsistent exam  Continue supportive care       VTE prophylaxis: lovenox

## 2021-02-06 NOTE — NON-PROVIDER
"CC: \"I woke up and couldn't feel the Right side of my body\"     History of Presenting Illness  40 y.o. male with a past medical history of DM, HLD, migraines, multiple concussions, and seizure disorder presented to the ED after a 1 week history of tingling sensation throughout the right side of his body and persistent headache described as \"an elephant on my head\", and chalky taste in his mouth.      Pt went to CT at which point they ruled out hemmorhagic stroke, pt is currently being worked up for ischemic stroke.       Interval Update:  2/4 - Pt reports he is doing well, other than a headache described as 8/10 intensity, dull throbbing sensation, without radiation, worse with light and sound, better with darkness and silence. Pt also endorses some mild epigastric pain. Speech is slurred and pt periodically stutters. Pt reports no difficulty swallowing and normal bowel and bladder sensation and function without diarrhea or burning. NIH scale of 13  2/5 - Pt reports he is doing well, slept well, still has dull throbbing headache described as about a 2/10 in intensity w/o radiation. Pt denies photophobia or phonophobia. Pt continues to endorse some mild epigastric discomfort. Speech is still slurred and and pt stutters. Pt right sided facial droop appears to be progressing, and is better today than yesterday. Pt continues to report that he cannot feel any part of his right side, but reported he could slightly move his left leg. On attending examination of patient it was discovered that he was able to use his right arm freely.       2/6 - Pt reports he is doing much better today, he slept well and his headaches have been very manageable. Pt reports that he would gladly go to SNF or rehab facility, but that he is still currently quite weak. Pt still endorses right sided weakness and decreased sensation throughout his right side. Pt noted to use his right arm to move his computer screen when I left the room. "     Psychiatry was consulted, and while patient is found to be cognitively impaired, his multiple admissions in the past year with similar complaints, workups and consults have all revealed no stroke or intracranial abnormality detected. Pt noted to have conversion disorder and is reported to be a compulsive liar by his . Psychiatry recommends outpatient therapy.      Review of Systems  Review of Systems   Constitutional: Negative for chills, diaphoresis, fever, malaise/fatigue and weight loss.   HENT: Positive for tinnitus. Negative for ear pain, hearing loss, nosebleeds and sore throat.    Eyes: Negative for blurred vision, double vision, photophobia, pain, discharge and redness.   Respiratory: Negative for cough, hemoptysis, shortness of breath, wheezing and stridor.    Cardiovascular: Negative for chest pain, palpitations, orthopnea, claudication, leg swelling and PND.   Gastrointestinal: Positive for heartburn.   Genitourinary: Negative for dysuria and hematuria.   Musculoskeletal: Negative for myalgias.   Skin: Negative for itching and rash.   Neurological: Positive for focal weakness and headaches. Negative for dizziness.      Past Medical History  Past Medical History:   Diagnosis Date   • Anxiety     BIPOLAR   • ASTHMA    • Bipolar 1 disorder ([facility])    • Depression    • Diabetes ([facility])     Type II Diabetes   • Fall     passed out 2 wks ago   • Glaucoma    • Glaucoma 1982    both eyes/ blind on left eye   • Hypothyroidism    • Indigestion     once in a while   • Mental disorder     learning disabilities; speech impairment; developmental delays   • Murmur     since birth   • Pneumonia     remote   • Psychiatric problem 2002    PTSD   • S/P thyroidectomy    • Seizure ([facility]) 2010   • Seizure disorder ([facility])    • Unspecified disorder of thyroid        Surgical History  Past Surgical History:   Procedure Laterality Date   • EYE SURGERY     • OTHER      Hernia Repair when he was 10 yrs old   • THYROID  LOBECTOMY         Family History  Family History   Problem Relation Age of Onset   • Hypertension Mother    • Heart Disease Mother    • Lung Disease Mother    • Stroke Maternal Grandmother        Social History  Social History     Socioeconomic History   • Marital status: Single     Spouse name: Not on file   • Number of children: Not on file   • Years of education: Not on file   • Highest education level: Not on file   Occupational History   • Not on file   Social Needs   • Financial resource strain: Somewhat hard   • Food insecurity     Worry: Sometimes true     Inability: Sometimes true   • Transportation needs     Medical: No     Non-medical: No   Tobacco Use   • Smoking status: Current Every Day Smoker     Packs/day: 0.25     Types: Cigarettes, Cigars     Last attempt to quit: 2020     Years since quittin.6   • Smokeless tobacco: Never Used   • Tobacco comment: 3 cigarettes a day   Substance and Sexual Activity   • Alcohol use: Not Currently     Frequency: 2-3 times a week   • Drug use: Yes     Types: Inhaled     Comment: marijuana   • Sexual activity: Not on file   Lifestyle   • Physical activity     Days per week: Not on file     Minutes per session: Not on file   • Stress: Not on file   Relationships   • Social connections     Talks on phone: Not on file     Gets together: Not on file     Attends [employer] service: Not on file     Active member of club or organization: Not on file     Attends meetings of clubs or organizations: Not on file     Relationship status: Not on file   • Intimate partner violence     Fear of current or ex partner: Not on file     Emotionally abused: Not on file     Physically abused: Not on file     Forced sexual activity: Not on file   Other Topics Concern   • Not on file   Social History Narrative   • Not on file       Medications  Current Facility-Administered Medications   Medication Dose Route Frequency Provider Last Rate Last Admin   • topiramate (TOPAMAX) tablet 25 mg   25 mg Oral DAILY Aniya Hoyos M.D.   25 mg at 02/06/21 0522   • sertraline (Zoloft) tablet 100 mg  100 mg Oral DAILY Aniya Hoyos M.D.   100 mg at 02/06/21 0522   • prazosin (MINIPRESS) capsule 4 mg  4 mg Oral QHS Aniya Hoyos M.D.   Stopped at 02/04/21 2100   • metFORMIN (GLUCOPHAGE) tablet 1,000 mg  1,000 mg Oral BID WITH MEALS Aniya Hoyos M.D.   1,000 mg at 02/06/21 0801   • lurasidone (LATUDA) tablet 20 mg  20 mg Oral PM MEAL Aniya Hoyos M.D.   20 mg at 02/05/21 1743   • lisinopril (PRINIVIL) tablet 10 mg  10 mg Oral DAILY Aniya Hoyos M.D.   Stopped at 02/05/21 0510   • montelukast (SINGULAIR) tablet 10 mg  10 mg Oral QHS Aniya Hoyos M.D.   10 mg at 02/05/21 2058   • fenofibrate micronized (LOFIBRA) capsule 134 mg  134 mg Oral Q EVENING Aniya Hoyos M.D.   134 mg at 02/05/21 1743   • vitamin D (cholecalciferol) tablet 4,000 Units  4,000 Units Oral QHS Aniya Hoyos M.D.   4,000 Units at 02/05/21 2058   • divalproex (DEPAKOTE) delayed-release tablet 500 mg  500 mg Oral QAM Aniya Hoyos M.D.   500 mg at 02/06/21 0522    And   • divalproex (DEPAKOTE) delayed-release tablet 1,500 mg  1,500 mg Oral Q EVENING Aniya Hoyos M.D.   1,500 mg at 02/05/21 1744   • insulin regular (HumuLIN R,NovoLIN R) injection  1-6 Units Subcutaneous 4X/DAY ACHS Aniya Hoyos M.D.   1 Units at 02/06/21 1150    And   • glucose 4 g chewable tablet 16 g  16 g Oral Q15 MIN PRN Aniya Hoyos M.D.        And   • dextrose 50% (D50W) injection 50 mL  50 mL Intravenous Q15 MIN PRN Aniya Hoyos M.D.       • aspirin EC (ECOTRIN) tablet 81 mg  81 mg Oral QAM Brandan Pacheco M.D.   81 mg at 02/06/21 0522   • atorvastatin (LIPITOR) tablet 80 mg  80 mg Oral Q EVENING Brandan Pacheco M.D.   80 mg at 02/05/21 1743   • budesonide-formoterol (SYMBICORT) 160-4.5 MCG/ACT inhaler 2 Puff  2 Puff Inhalation BID Brandan Pacheco M.D.   2 Puff at 02/05/21 1743   • busPIRone (BUSPAR) tablet 10 mg  10 mg Oral TID Brandan Pacheco M.D.   10 mg at 02/06/21 1142   •  "famotidine (PEPCID) tablet 10 mg  10 mg Oral BID Brandan Pacheco M.D.   10 mg at 02/06/21 0523   • levothyroxine (SYNTHROID) tablet 225 mcg  225 mcg Oral AM ES Brandan Pacheco M.D.   225 mcg at 02/06/21 0600   • traZODone (DESYREL) tablet 100 mg  100 mg Oral HS PRN Brandan Pacheco M.D.       • senna-docusate (PERICOLACE or SENOKOT S) 8.6-50 MG per tablet 2 Tab  2 Tab Oral BID Brandan Pacheco M.D.   Stopped at 02/05/21 1800    And   • polyethylene glycol/lytes (MIRALAX) PACKET 1 Packet  1 Packet Oral QDAY PRN Brandan Pacheco M.D.        And   • magnesium hydroxide (MILK OF MAGNESIA) suspension 30 mL  30 mL Oral QDAY PRN Brandan Pacheco M.D.        And   • bisacodyl (DULCOLAX) suppository 10 mg  10 mg Rectal QDAY PRN Brandan Pacheco M.D.       • enoxaparin (LOVENOX) inj 40 mg  40 mg Subcutaneous Q EVENING Brandan Pacheco M.D.   40 mg at 02/05/21 1743   • acetaminophen (Tylenol) tablet 650 mg  650 mg Oral Q6HRS PRN Brandan Pacheco M.D.   650 mg at 02/05/21 0909   • ondansetron (ZOFRAN) syringe/vial injection 4 mg  4 mg Intravenous Q4HRS PRN Brandan Pacheco M.D.       • ondansetron (ZOFRAN ODT) dispertab 4 mg  4 mg Oral Q4HRS PRN Brandan Pacheco M.D.       • promethazine (PHENERGAN) tablet 12.5-25 mg  12.5-25 mg Oral Q4HRS PRN Brandan Pacheco M.D.       • promethazine (PHENERGAN) suppository 12.5-25 mg  12.5-25 mg Rectal Q4HRS PRN Brandan Pacheco M.D.       • prochlorperazine (COMPAZINE) injection 5-10 mg  5-10 mg Intravenous Q4HRS PRN Brandan Pacheco M.D.       • LORazepam (ATIVAN) injection 4 mg  4 mg Intravenous Q10 MIN PRN Brandan Pacheco M.D.           Allergies  Allergies   Allergen Reactions   • Geodon [Ziprasidone Hcl] Anaphylaxis     Anaphylaxis per patient   • Abilify      \"Feeling tired, like I don't even know whats going on around me\"   • Fish      Pt reports fish causes him to be sick to his stomach  Not listed on MAR noted 2/3/2021         Physical Exam  Temp:  [36.3 °C (97.4 °F)-37.4 °C (99.3 °F)] 36.6 °C (97.9 °F)  Pulse:  [58-77] 77  Resp:  [15-20] 16  BP: " (101-120)/(42-72) 120/65  SpO2:  [94 %-96 %] 94 %    Physical Exam   Constitutional: He is oriented to person, place, and time. He appears well-developed and well-nourished. No distress.   HENT:   Head: Normocephalic and atraumatic.   Eyes: Right eye exhibits no chemosis, no discharge, no exudate and no hordeolum. No foreign body present in the right eye. Left eye exhibits chemosis. Left eye exhibits no hordeolum. No foreign body present in the left eye. Left conjunctiva is injected. No scleral icterus. Right pupil is round and reactive. Left pupil is not reactive. Left pupil is round. Pupils are unequal.   Left eye erythema   Neck: Normal range of motion. Neck supple. Normal carotid pulses and no JVD present. Carotid bruit is not present. No tracheal deviation present. No thyromegaly present.       Cardiovascular: Normal rate, regular rhythm and intact distal pulses. Exam reveals no gallop and no friction rub.   No murmur heard.  Pulses:       Carotid pulses are 2+ on the right side and 2+ on the left side.       Radial pulses are 2+ on the right side and 2+ on the left side.        Femoral pulses are 2+ on the right side and 2+ on the left side.       Popliteal pulses are 2+ on the right side and 2+ on the left side.        Dorsalis pedis pulses are 2+ on the right side and 2+ on the left side.        Posterior tibial pulses are 2+ on the right side and 2+ on the left side.   Pulmonary/Chest: Effort normal and breath sounds normal. No respiratory distress. He has no wheezes. He has no rales.   Abdominal: He exhibits no distension and no mass. There is abdominal tenderness in the epigastric area. There is no rebound and no guarding.   Musculoskeletal:         General: No tenderness, deformity or edema.      Comments: Right sided weakness with 1/5 in RUE and RLE   Lymphadenopathy:     He has no cervical adenopathy.   Neurological: He is alert and oriented to person, place, and time. He displays no atrophy and no  tremor. A cranial nerve deficit is present. He exhibits normal muscle tone. He displays no seizure activity.   Reflex Scores:       Patellar reflexes are 2+ on the right side and 2+ on the left side.       Achilles reflexes are 2+ on the right side and 2+ on the left side.   CN 2,3,4, 6, grossly intact. Left CN 2 non-reactive, R CN7 weakness, R CN5 decreased sensation, R CN 11 decreased motor. CN 12 WNL   Skin: Skin is warm and dry. No rash noted. He is not diaphoretic. No erythema. No pallor.   Psychiatric: His speech is delayed and slurred.   Pt has stuttering speech.        Fluids  Date 02/06/21 0700 - 02/07/21 0659   Shift [id number] [id number] 24 Hour Total   INTAKE   Shift Total       OUTPUT   Urine 800   800   Shift Total 800   800   Weight (kg) 131.6 131.6 131.6 131.6       Laboratory  Labs reviewed, pertinent labs below.  Recent Labs     02/04/21  0242   WBC 8.5   RBC 4.30*   HEMOGLOBIN 12.6*   HEMATOCRIT 39.3*   MCV 91.4   MCH 29.3   MCHC 32.1*   RDW 47.0   PLATELETCT 253   MPV 10.4     Recent Labs     02/04/21  0242 02/05/21  0304   SODIUM 133* 136   POTASSIUM 3.5* 4.0   CHLORIDE 100 100   CO2 23 23   GLUCOSE 180* 129*   BUN 24* 23*   CREATININE 1.02 1.17   CALCIUM 8.4* 9.7                URINALYSIS:  Lab Results   Component Value Date/Time    COLORURINE Yellow 02/03/2021 1616    CLARITY Clear 02/03/2021 1616    SPECGRAVITY 1.045 02/03/2021 1616    PHURINE 5.0 02/03/2021 1616    KETONES Trace (A) 02/03/2021 1616    PROTEINURIN Negative 02/03/2021 1616    BILIRUBINUR Negative 02/03/2021 1616    UROBILU 1.0 02/03/2021 1616    NITRITE Negative 02/03/2021 1616    LEUKESTERAS Negative 02/03/2021 1616    OCCULTBLOOD Negative 02/03/2021 1616     UPC  No results found for: TOTPROTUR No results found for: CREATININEU    Imaging reviewed  MR-BRAIN-WITH & W/O   Final Result      1.  Unchanged severe supratentorial leukoencephalopathy. Seen on prior exams dating back to 2015.   2.  Mild cerebral atrophy.   3.   No acute findings.      DX-CHEST-PORTABLE (1 VIEW)   Final Result      No acute cardiopulmonary abnormality.      CT-CTA NECK WITH & W/O-POST PROCESSING   Final Result      1.  No high-grade stenosis, large vessel occlusion, aneurysm or dissection.   2.  A 3.4 cm heterogeneously enhancing mass in the neck, at the level of the thyroid cartilage. It may represent an ectopic thyroid tissue/nodule. It has grown slightly since 2015 study. Further evaluation with ultrasound is advised on an outpatient    basis.      CT-CTA HEAD WITH & W/O-POST PROCESS   Final Result      1.  CT angiogram of the [place] within normal limits.      2.  Abnormal low attenuation in the periventricular white matter again noted.      CT-HEAD W/O   Final Result      1.  No acute intracranial hemorrhage.      2.  Diffuse confluent periventricular hypodensity as on the prior MRI is consistent with extensive chronic demyelination or dysmyelination as described previously.             Assessment/Plan  Right sided weakness              Psychiatric - Malingering or conversion disorder. Pt noted on psychiatric consult note to have several admissions in the past year for similar complaints with no acute findings of vascular insult or hemorrhage.                            Plan - outpatient therapy, Physical therapy and SNF referral                Vascular -  Ischemic stroke possible, given patients CT finding of no acute intracerebral hemorraghic stroke ischemia is most likely alternative.                           Plan - ischemic stroke workup, CT w/o and w/ contrast revealed no acute hemorrhage or high-grade stenosis, or large vessel occlusion, aneurysm or dissection. Rule out DVT with doppler study of lower extremities and echocardiogram with bubble study for patent foramen ovale. EKG for a-fib.  Allow for permissive hypertension and hold all antihypertensives at this time.                  Infectious - Pt, is diabetic which puts him at  higher risk of infection. Cannot rule out infectious insult as a result of patient neurological deficits. Meningitis and encephalitis can present with headache and focal neurological deficits.                          Plan - LP with CSF analysis and gram stain                 Neoplastic - Neoplastic origin of neurological deficit remains a possibility, however is somewhat more unlikely given patients negative CT scan                          Plan - MRI                 Degenerative - Pt has history of recurrent trauma and head injury. Neurological defects are associated with worsening and degenerative changes of lipohylinosis or atheroma formation from vascular insults.                          Plan - continue statin therapy and antiplatelet therapy of Aspirin 81.                  Iatrogenic/Intoxication - Pt has hx of recreational marijuana use, and while patient denies any other recreational drug use, acute hypoxemic brain injury is a possibility                          Plan - Urine toxicology                 Congenital - Pt has a family hx of migraines in his family on his mothers side. While the patient does not endorse any family hx of stroke, there is a possibility of AV malformations that could be present and causing vascular steal.                           Plan - MR A for abnormal flow                 Autoimmune -  Pt has white matter effacement on most recent imaging, and may represent progression of multiple sclerosis                          Plan - CSF analysis for oligoclonal bands and MRI for white matter effacement.                  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 Neurological deficits can be present as progression of subsequent brain injury.                           Plan - MRI                 Endocrine/Metabolic - Pt has history of DM, given patients history of DM, DKA or  severe hypoglycemia is a possibility.                          Plan A1c, BMP                Abdominal Discomfort              Vascular - Pt reports mid epigastric pain, can be concerning for mesenteric ischemia.                           Plan - ABD CT A                 Infectious - may represent infection of H. Pylori                           Plan - Urease breath test and fecal antigen for H. Pylori                 Neoplastic - may be stomach neoplastic growth                          Plan - Endoscopic exam per GI                            Degenerative - pt is obese, and may have thoracic nerve compression manifesting as abdominal pain.                          Plan - MRI spine                 Inflammatory/Autoimmune - Gastroesophageal reflux disease, pt is overweight, which can be associated with GERD and the associated reflux to cause abdominal pain                          Plan - PPI therapy           Diabetes - continue metformin 1000 BID, and insulin regular 4x/day     Seizure disorder - continue divalproex, and topiramate 25 qd.      HTN - continue Lisinopril     Anxiety - started on sertraline 100 po qd, and buspirone 10mg PID.

## 2021-02-06 NOTE — CONSULTS
"BRIEF PSYCHIATRIC CONSULT NOTE: consult and chart reviewed. This pt has been seen multiple times by psychiatry. He has a documented SZ disorder, likely intellectual disability and features of a conversion disorder. He has been given zoloft for depression in the past. He has untreated/unstable  Hypothyroidism and DM as well. Given \"severe supratentorial white matter disease\" on MRI, this pt undoubted has cognitive problems as well. Even if there is a component of functional neurological disease, there is nothing we can do that will significantly improve his symptoms overall: he has significant neurological disease  As evidenced by repeated admits with same concerns for functional neurological symptoms.  Therapy is the treatment of choice and right now this is not available in the hospital. Recommend outpt referrals for same.      10/16/20: speech cog:......with...conversation disorder with recurrent R sided weakness. Per notes, pt lives at [facility]. Pt has been admitted 3 times this year for similar work ups with no stroke or intracranial abnormality detected. .....Notes indicate that per pt's , the patient compulsively lies. Patient seen for a cognitive linguistic evaluation and presents with a score of 24/30 on the MoCA, with 'mild' cognitive deficits.  Pt scored well on visuospatial, mental math and executive fxn subtests, fxnl verbal problem-solving per [facility].  Pt with mildly reduced attention, abstraction (tended to be concrete) and STM, orientation.  Speech rate is slow and speech is inconsistently effortful and dysfluent c/w 'stuttering.' Query baseline status, query inconsistent dysfluency.  Pt with PMH positive for mental and psych disorders; pt endorses slowness due to seizure disorder.  HH may be of benefit for strategies to improve speech fluency if this in not pt's baseline.    Other notes: pt has lived at [facility] and group homes ie supervised settings.     Consult is cancelled for " now.

## 2021-02-06 NOTE — CARE PLAN
Problem: Safety  Goal: Will remain free from injury  Outcome: PROGRESSING AS EXPECTED  Bed locked and in lowest position.  Bed alarm on.  Treaded socks.  Call light and belongings with in reach.  Pt educated to call for assistance. Pt verbalized understanding.  Hourly rounding in place.       Problem: Knowledge Deficit  Goal: Knowledge of disease process/condition, treatment plan, diagnostic tests, and medications will improve  Outcome: PROGRESSING AS EXPECTED   Discussed POC and medications with patient.  Patient verbalized understanding.

## 2021-02-07 LAB
GLUCOSE BLD-MCNC: 137 MG/DL (ref 65–99)
GLUCOSE BLD-MCNC: 142 MG/DL (ref 65–99)
GLUCOSE BLD-MCNC: 147 MG/DL (ref 65–99)
GLUCOSE BLD-MCNC: 197 MG/DL (ref 65–99)

## 2021-02-07 PROCEDURE — A9270 NON-COVERED ITEM OR SERVICE: HCPCS | Performed by: HOSPITALIST

## 2021-02-07 PROCEDURE — 96372 THER/PROPH/DIAG INJ SC/IM: CPT

## 2021-02-07 PROCEDURE — 97530 THERAPEUTIC ACTIVITIES: CPT

## 2021-02-07 PROCEDURE — 82962 GLUCOSE BLOOD TEST: CPT | Mod: 91

## 2021-02-07 PROCEDURE — A9270 NON-COVERED ITEM OR SERVICE: HCPCS | Performed by: INTERNAL MEDICINE

## 2021-02-07 PROCEDURE — 700111 HCHG RX REV CODE 636 W/ 250 OVERRIDE (IP): Performed by: INTERNAL MEDICINE

## 2021-02-07 PROCEDURE — 700102 HCHG RX REV CODE 250 W/ 637 OVERRIDE(OP): Performed by: INTERNAL MEDICINE

## 2021-02-07 PROCEDURE — 700102 HCHG RX REV CODE 250 W/ 637 OVERRIDE(OP): Performed by: HOSPITALIST

## 2021-02-07 PROCEDURE — 99224 PR SUBSEQUENT OBSERVATION CARE,LEVEL I: CPT | Performed by: HOSPITALIST

## 2021-02-07 PROCEDURE — 97116 GAIT TRAINING THERAPY: CPT

## 2021-02-07 PROCEDURE — G0378 HOSPITAL OBSERVATION PER HR: HCPCS

## 2021-02-07 RX ADMIN — PRAZOSIN HYDROCHLORIDE 4 MG: 2 CAPSULE ORAL at 21:18

## 2021-02-07 RX ADMIN — METFORMIN HYDROCHLORIDE 1000 MG: 500 TABLET ORAL at 16:54

## 2021-02-07 RX ADMIN — BUSPIRONE HYDROCHLORIDE 10 MG: 10 TABLET ORAL at 16:54

## 2021-02-07 RX ADMIN — DIVALPROEX SODIUM 1500 MG: 500 TABLET, DELAYED RELEASE ORAL at 16:52

## 2021-02-07 RX ADMIN — BUDESONIDE AND FORMOTEROL FUMARATE DIHYDRATE 2 PUFF: 160; 4.5 AEROSOL RESPIRATORY (INHALATION) at 16:55

## 2021-02-07 RX ADMIN — BUDESONIDE AND FORMOTEROL FUMARATE DIHYDRATE 2 PUFF: 160; 4.5 AEROSOL RESPIRATORY (INHALATION) at 06:07

## 2021-02-07 RX ADMIN — BUSPIRONE HYDROCHLORIDE 10 MG: 10 TABLET ORAL at 12:30

## 2021-02-07 RX ADMIN — FAMOTIDINE 10 MG: 20 TABLET ORAL at 05:59

## 2021-02-07 RX ADMIN — METFORMIN HYDROCHLORIDE 1000 MG: 500 TABLET ORAL at 09:39

## 2021-02-07 RX ADMIN — FENOFIBRATE 134 MG: 134 CAPSULE ORAL at 16:53

## 2021-02-07 RX ADMIN — TOPIRAMATE 25 MG: 25 TABLET, FILM COATED ORAL at 05:57

## 2021-02-07 RX ADMIN — LEVOTHYROXINE SODIUM 225 MCG: 0.2 TABLET ORAL at 06:00

## 2021-02-07 RX ADMIN — SERTRALINE HYDROCHLORIDE 100 MG: 100 TABLET ORAL at 06:00

## 2021-02-07 RX ADMIN — ATORVASTATIN CALCIUM 80 MG: 80 TABLET, FILM COATED ORAL at 16:53

## 2021-02-07 RX ADMIN — MONTELUKAST 10 MG: 10 TABLET, FILM COATED ORAL at 21:17

## 2021-02-07 RX ADMIN — DIVALPROEX SODIUM 500 MG: 500 TABLET, DELAYED RELEASE ORAL at 05:59

## 2021-02-07 RX ADMIN — DOCUSATE SODIUM 50 MG AND SENNOSIDES 8.6 MG 2 TABLET: 8.6; 5 TABLET, FILM COATED ORAL at 05:57

## 2021-02-07 RX ADMIN — BUSPIRONE HYDROCHLORIDE 10 MG: 10 TABLET ORAL at 05:58

## 2021-02-07 RX ADMIN — Medication 4000 UNITS: at 21:16

## 2021-02-07 RX ADMIN — FAMOTIDINE 10 MG: 20 TABLET ORAL at 16:59

## 2021-02-07 RX ADMIN — LISINOPRIL 10 MG: 10 TABLET ORAL at 05:58

## 2021-02-07 RX ADMIN — INSULIN HUMAN 1 UNITS: 100 INJECTION, SOLUTION PARENTERAL at 21:19

## 2021-02-07 RX ADMIN — ASPIRIN 81 MG: 81 TABLET, COATED ORAL at 05:58

## 2021-02-07 RX ADMIN — LURASIDONE HYDROCHLORIDE 20 MG: 20 TABLET, FILM COATED ORAL at 16:55

## 2021-02-07 RX ADMIN — ENOXAPARIN SODIUM 40 MG: 40 INJECTION SUBCUTANEOUS at 16:56

## 2021-02-07 ASSESSMENT — ENCOUNTER SYMPTOMS
MUSCULOSKELETAL NEGATIVE: 1
WEIGHT LOSS: 0
PALPITATIONS: 0
WEAKNESS: 1
VOMITING: 0
SENSORY CHANGE: 1
MYALGIAS: 0
NAUSEA: 0
FEVER: 0
CHILLS: 0
CARDIOVASCULAR NEGATIVE: 1
DIAPHORESIS: 0
GASTROINTESTINAL NEGATIVE: 1
CONSTITUTIONAL NEGATIVE: 1
EYES NEGATIVE: 1
HEADACHES: 0
BRUISES/BLEEDS EASILY: 0
ABDOMINAL PAIN: 0
PSYCHIATRIC NEGATIVE: 1
FOCAL WEAKNESS: 1
SORE THROAT: 0
RESPIRATORY NEGATIVE: 1
SHORTNESS OF BREATH: 0
DEPRESSION: 0
COUGH: 0
DIZZINESS: 0
BLURRED VISION: 0

## 2021-02-07 ASSESSMENT — COGNITIVE AND FUNCTIONAL STATUS - GENERAL
MOVING FROM LYING ON BACK TO SITTING ON SIDE OF FLAT BED: A LITTLE
WALKING IN HOSPITAL ROOM: A LITTLE
MOBILITY SCORE: 18
TURNING FROM BACK TO SIDE WHILE IN FLAT BAD: A LITTLE
CLIMB 3 TO 5 STEPS WITH RAILING: A LITTLE
STANDING UP FROM CHAIR USING ARMS: A LITTLE
MOVING TO AND FROM BED TO CHAIR: A LITTLE
SUGGESTED CMS G CODE MODIFIER MOBILITY: CK

## 2021-02-07 ASSESSMENT — GAIT ASSESSMENTS
DISTANCE (FEET): 25
GAIT LEVEL OF ASSIST: SUPERVISED
DEVIATION: BRADYKINETIC;DECREASED HEEL STRIKE;DECREASED TOE OFF
ASSISTIVE DEVICE: 4 WHEEL WALKER

## 2021-02-07 ASSESSMENT — LIFESTYLE VARIABLES: SUBSTANCE_ABUSE: 0

## 2021-02-07 NOTE — NON-PROVIDER
"CC: \"I woke up and couldn't feel the Right side of my body\"     History of Presenting Illness  40 y.o. male with a past medical history of DM, HLD, migraines, multiple concussions, and seizure disorder presented to the ED after a 1 week history of tingling sensation throughout the right side of his body and persistent headache described as \"an elephant on my head\", and chalky taste in his mouth.      Pt went to CT at which point they ruled out hemmorhagic stroke, pt is currently being worked up for ischemic stroke.       Interval Update:  2/4 - Pt reports he is doing well, other than a headache described as 8/10 intensity, dull throbbing sensation, without radiation, worse with light and sound, better with darkness and silence. Pt also endorses some mild epigastric pain. Speech is slurred and pt periodically stutters. Pt reports no difficulty swallowing and normal bowel and bladder sensation and function without diarrhea or burning. NIH scale of 13  2/5 - Pt reports he is doing well, slept well, still has dull throbbing headache described as about a 2/10 in intensity w/o radiation. Pt denies photophobia or phonophobia. Pt continues to endorse some mild epigastric discomfort. Speech is still slurred and and pt stutters. Pt right sided facial droop appears to be progressing, and is better today than yesterday. Pt continues to report that he cannot feel any part of his right side, but reported he could slightly move his left leg. On attending examination of patient it was discovered that he was able to use his right arm freely.   2/6 - Pt reports he is doing much better today, he slept well and his headaches have been very manageable. Pt reports that he would gladly go to SNF or rehab facility, but that he is still currently quite weak. Pt still endorses right sided weakness and decreased sensation throughout his right side. Pt noted to use his right arm to move his computer screen when I left the room.     Psychiatry " was consulted, and while patient is found to be cognitively impaired, his multiple admissions in the past year with similar complaints, workups and consults have all revealed no stroke or intracranial abnormality detected. Pt noted to have conversion disorder and is reported to be a compulsive liar by his . Psychiatry recommends outpatient therapy.     2/7 - Pt reports he is doing well, slept well, and epigastric discomfort has now resolved. Pt reports that his girlfriend is planning on coming out to visit him sometime in the next month. Pt continues to endorse symptoms of right sided weakness and inability to move the right side of his body including physical exam, however I saw patient walking with PT later in the day. Pt denies CP, SOB, N/V or headache.          Review of Systems  Review of Systems   Constitutional: Negative for chills, diaphoresis, fever, malaise/fatigue and weight loss.   HENT: Positive for tinnitus. Negative for ear pain, hearing loss, nosebleeds and sore throat.    Eyes: Negative for blurred vision, double vision, photophobia, pain, discharge and redness.   Respiratory: Negative for cough, hemoptysis, shortness of breath, wheezing and stridor.    Cardiovascular: Negative for chest pain, palpitations, orthopnea, claudication, leg swelling and PND.   GI - no abdominal   Genitourinary: Negative for dysuria and hematuria.   Musculoskeletal: Negative for myalgias.   Skin: Negative for itching and rash.   Neurological: Positive for focal weakness and headaches. Negative for dizziness.      Past Medical History  Past Medical History:   Diagnosis Date   • Anxiety     BIPOLAR   • ASTHMA    • Bipolar 1 disorder (HCC)    • Depression    • Diabetes ([facility])     Type II Diabetes   • Fall     passed out 2 wks ago   • Glaucoma    • Glaucoma 1982    both eyes/ blind on left eye   • Hypothyroidism    • Indigestion     once in a while   • Mental disorder     learning disabilities; speech impairment;  developmental delays   • Murmur     since birth   • Pneumonia     remote   • Psychiatric problem     PTSD   • S/P thyroidectomy    • Seizure (HCC)    • Seizure disorder (HCC)    • Unspecified disorder of thyroid        Surgical History  Past Surgical History:   Procedure Laterality Date   • EYE SURGERY     • OTHER      Hernia Repair when he was 10 yrs old   • THYROID LOBECTOMY         Family History  Family History   Problem Relation Age of Onset   • Hypertension Mother    • Heart Disease Mother    • Lung Disease Mother    • Stroke Maternal Grandmother        Social History  Social History     Socioeconomic History   • Marital status: Single     Spouse name: Not on file   • Number of children: Not on file   • Years of education: Not on file   • Highest education level: Not on file   Occupational History   • Not on file   Social Needs   • Financial resource strain: Somewhat hard   • Food insecurity     Worry: Sometimes true     Inability: Sometimes true   • Transportation needs     Medical: No     Non-medical: No   Tobacco Use   • Smoking status: Current Every Day Smoker     Packs/day: 0.25     Types: Cigarettes, Cigars     Last attempt to quit: 2020     Years since quittin.6   • Smokeless tobacco: Never Used   • Tobacco comment: 3 cigarettes a day   Substance and Sexual Activity   • Alcohol use: Not Currently     Frequency: 2-3 times a week   • Drug use: Yes     Types: Inhaled     Comment: marijuana   • Sexual activity: Not on file   Lifestyle   • Physical activity     Days per week: Not on file     Minutes per session: Not on file   • Stress: Not on file   Relationships   • Social connections     Talks on phone: Not on file     Gets together: Not on file     Attends Sikhism service: Not on file     Active member of club or organization: Not on file     Attends meetings of clubs or organizations: Not on file     Relationship status: Not on file   • Intimate partner violence     Fear of current or ex  partner: Not on file     Emotionally abused: Not on file     Physically abused: Not on file     Forced sexual activity: Not on file   Other Topics Concern   • Not on file   Social History Narrative   • Not on file       Medications  Current Facility-Administered Medications   Medication Dose Route Frequency Provider Last Rate Last Admin   • topiramate (TOPAMAX) tablet 25 mg  25 mg Oral DAILY Aniya Hoyos M.D.   25 mg at 02/07/21 0557   • sertraline (Zoloft) tablet 100 mg  100 mg Oral DAILY Aniya Hoyos M.D.   100 mg at 02/07/21 0600   • prazosin (MINIPRESS) capsule 4 mg  4 mg Oral QHS Aniya Hoyos M.D.   4 mg at 02/06/21 2032   • metFORMIN (GLUCOPHAGE) tablet 1,000 mg  1,000 mg Oral BID WITH MEALS Aniya Hoyos M.D.   1,000 mg at 02/07/21 0939   • lurasidone (LATUDA) tablet 20 mg  20 mg Oral PM MEAL Aniya Hoyos M.D.   20 mg at 02/06/21 1736   • lisinopril (PRINIVIL) tablet 10 mg  10 mg Oral DAILY Aniya Hoyos M.D.   10 mg at 02/07/21 0558   • montelukast (SINGULAIR) tablet 10 mg  10 mg Oral QHS Aniya Hoyos M.D.   10 mg at 02/06/21 2033   • fenofibrate micronized (LOFIBRA) capsule 134 mg  134 mg Oral Q EVENING Aniya Hoyos M.D.   134 mg at 02/06/21 1725   • vitamin D (cholecalciferol) tablet 4,000 Units  4,000 Units Oral QHS Aniya Hoyos M.D.   4,000 Units at 02/06/21 2033   • divalproex (DEPAKOTE) delayed-release tablet 500 mg  500 mg Oral QAM Aniya Hoyos M.D.   500 mg at 02/07/21 0559    And   • divalproex (DEPAKOTE) delayed-release tablet 1,500 mg  1,500 mg Oral Q EVENING Aniya Hoyos M.D.   1,500 mg at 02/06/21 1721   • insulin regular (HumuLIN R,NovoLIN R) injection  1-6 Units Subcutaneous 4X/DAY ACHS Aniya Hoyos M.D.   Stopped at 02/07/21 0700    And   • glucose 4 g chewable tablet 16 g  16 g Oral Q15 MIN PRN Aniya Hoyos M.D.        And   • dextrose 50% (D50W) injection 50 mL  50 mL Intravenous Q15 MIN PRN Aniya Hoyos M.D.       • aspirin EC (ECOTRIN) tablet 81 mg  81 mg Oral XIMENA Pacheco,  M.D.   81 mg at 02/07/21 0558   • atorvastatin (LIPITOR) tablet 80 mg  80 mg Oral Q EVENING Brandan Pacheco M.D.   80 mg at 02/06/21 1725   • budesonide-formoterol (SYMBICORT) 160-4.5 MCG/ACT inhaler 2 Puff  2 Puff Inhalation BID Brandan Pacheco M.D.   2 Puff at 02/07/21 0607   • busPIRone (BUSPAR) tablet 10 mg  10 mg Oral TID Brandan Pacheco M.D.   10 mg at 02/07/21 0558   • famotidine (PEPCID) tablet 10 mg  10 mg Oral BID Brandan Pacheco M.D.   10 mg at 02/07/21 0559   • levothyroxine (SYNTHROID) tablet 225 mcg  225 mcg Oral AM ES Brandan Pacheco M.D.   225 mcg at 02/07/21 0600   • traZODone (DESYREL) tablet 100 mg  100 mg Oral HS PRN Brandan Pacheco M.D.       • senna-docusate (PERICOLACE or SENOKOT S) 8.6-50 MG per tablet 2 Tab  2 Tab Oral BID Brandan Pacheco M.D.   2 Tab at 02/07/21 0557    And   • polyethylene glycol/lytes (MIRALAX) PACKET 1 Packet  1 Packet Oral QDAY PRN Brandan Pacheco M.D.        And   • magnesium hydroxide (MILK OF MAGNESIA) suspension 30 mL  30 mL Oral QDAY PRN Brandan Pacheco M.D.        And   • bisacodyl (DULCOLAX) suppository 10 mg  10 mg Rectal QDAY PRN Brandan Pacheco M.D.       • enoxaparin (LOVENOX) inj 40 mg  40 mg Subcutaneous Q EVENING Brandan Pacheco M.D.   40 mg at 02/06/21 1729   • acetaminophen (Tylenol) tablet 650 mg  650 mg Oral Q6HRS PRN Brandan Pacheco M.D.   650 mg at 02/05/21 0909   • ondansetron (ZOFRAN) syringe/vial injection 4 mg  4 mg Intravenous Q4HRS PRN Brandan Pacheco M.D.       • ondansetron (ZOFRAN ODT) dispertab 4 mg  4 mg Oral Q4HRS PRN Brandan Pacheco M.D.       • promethazine (PHENERGAN) tablet 12.5-25 mg  12.5-25 mg Oral Q4HRS PRN Brandan Pacheco M.D.       • promethazine (PHENERGAN) suppository 12.5-25 mg  12.5-25 mg Rectal Q4HRS PRN Brandan Pacheco M.D.       • prochlorperazine (COMPAZINE) injection 5-10 mg  5-10 mg Intravenous Q4HRS PRN Brandan Pacheco M.D.       • LORazepam (ATIVAN) injection 4 mg  4 mg Intravenous Q10 MIN PRN Brandan Pacheco M.D.           Allergies  Allergies   Allergen Reactions   • Geodon  "[Ziprasidone Hcl] Anaphylaxis     Anaphylaxis per patient   • Abilify      \"Feeling tired, like I don't even know whats going on around me\"   • Fish      Pt reports fish causes him to be sick to his stomach  Not listed on MAR noted 2/3/2021         Physical Exam  Temp:  [36.1 °C (97 °F)-37 °C (98.6 °F)] 36.4 °C (97.6 °F)  Pulse:  [60-70] 60  Resp:  [16-18] 16  BP: ()/(55-66) 105/65  SpO2:  [93 %-97 %] 93 %    Physical Exam   Constitutional: He is oriented to person, place, and time. He appears well-developed and well-nourished. No distress.   HENT:   Head: Normocephalic and atraumatic.   Eyes: Right eye exhibits no chemosis, no discharge, no exudate and no hordeolum. No foreign body present in the right eye. Left eye exhibits chemosis. Left eye exhibits no hordeolum. No foreign body present in the left eye. Left conjunctiva is injected. No scleral icterus. Right pupil is round and reactive. Left pupil is not reactive. Left pupil is round. Pupils are unequal.   Left eye erythema   Neck: Normal range of motion. Neck supple. Normal carotid pulses and no JVD present. Carotid bruit is not present. No tracheal deviation present. No thyromegaly present.       Cardiovascular: Normal rate, regular rhythm and intact distal pulses. Exam reveals no gallop and no friction rub.   No murmur heard.  Pulses:       Carotid pulses are 2+ on the right side and 2+ on the left side.       Radial pulses are 2+ on the right side and 2+ on the left side.        Dorsalis pedis pulses are 2+ on the right side and 2+ on the left side.        Posterior tibial pulses are 2+ on the right side and 2+ on the left side.   Pulmonary/Chest: Effort normal and breath sounds normal. No respiratory distress. He has no wheezes. He has no rales.   Abdominal: He exhibits no distension and no mass. There is abdominal tenderness in the epigastric area. There is no rebound and no guarding.   Musculoskeletal:         General: No tenderness, deformity or " edema.      Comments: Right sided weakness with 1/5 in RUE and RLE   Lymphadenopathy:     He has no cervical adenopathy.   Neurological: He is alert and oriented to person, place, and time. He displays no atrophy and no tremor. A cranial nerve deficit is present. He exhibits normal muscle tone. He displays no seizure activity.   Reflex Scores:       Patellar reflexes are 2+ on the right side and 2+ on the left side.       Achilles reflexes are 2+ on the right side and 2+ on the left side.   CN 2,3,4, 6, grossly intact. Left CN 2 non-reactive, R CN7 weakness, R CN5 decreased sensation, R CN 11 decreased motor. CN 12 WNL   Skin: Skin is warm and dry. No rash noted. He is not diaphoretic. No erythema. No pallor.   Psychiatric: His speech is delayed and slurred.   Pt has stuttering speech.        Fluids  Date 02/06/21 0700 - 02/07/21 0659   Shift [id number] 6854-0347 8241-2670 24 Hour Total   INTAKE   Shift Total       OUTPUT   Urine 800   800   Shift Total 800   800   Weight (kg) 131.6 131.6 131.6 131.6       Laboratory  Labs reviewed, pertinent labs below.      Recent Labs     02/05/21  0304   SODIUM 136   POTASSIUM 4.0   CHLORIDE 100   CO2 23   GLUCOSE 129*   BUN 23*   CREATININE 1.17   CALCIUM 9.7                URINALYSIS:  Lab Results   Component Value Date/Time    COLORURINE Yellow 02/03/2021 1616    CLARITY Clear 02/03/2021 1616    SPECGRAVITY 1.045 02/03/2021 1616    PHURINE 5.0 02/03/2021 1616    KETONES Trace (A) 02/03/2021 1616    PROTEINURIN Negative 02/03/2021 1616    BILIRUBINUR Negative 02/03/2021 1616    UROBILU 1.0 02/03/2021 1616    NITRITE Negative 02/03/2021 1616    LEUKESTERAS Negative 02/03/2021 1616    OCCULTBLOOD Negative 02/03/2021 1616     UPC  No results found for: TOTPROTUR No results found for: CREATININEU    Imaging reviewed  MR-BRAIN-WITH & W/O   Final Result      1.  Unchanged severe supratentorial leukoencephalopathy. Seen on prior exams dating back to 2015.   2.  Mild cerebral atrophy.    3.  No acute findings.      DX-CHEST-PORTABLE (1 VIEW)   Final Result      No acute cardiopulmonary abnormality.      CT-CTA NECK WITH & W/O-POST PROCESSING   Final Result      1.  No high-grade stenosis, large vessel occlusion, aneurysm or dissection.   2.  A 3.4 cm heterogeneously enhancing mass in the neck, at the level of the thyroid cartilage. It may represent an ectopic thyroid tissue/nodule. It has grown slightly since 2015 study. Further evaluation with ultrasound is advised on an outpatient    basis.      CT-CTA HEAD WITH & W/O-POST PROCESS   Final Result      1.  CT angiogram of the [place] within normal limits.      2.  Abnormal low attenuation in the periventricular white matter again noted.      CT-HEAD W/O   Final Result      1.  No acute intracranial hemorrhage.      2.  Diffuse confluent periventricular hypodensity as on the prior MRI is consistent with extensive chronic demyelination or dysmyelination as described previously.             Assessment/Plan  Right sided weakness              Psychiatric - Malingering or conversion disorder. Pt noted on psychiatric consult note to have several admissions in the past year for similar complaints with no acute findings of vascular insult or hemorrhage.                            Plan - outpatient therapy, Physical therapy and SNF referral                Vascular -  Ischemic stroke possible, given patients CT finding of no acute intracerebral hemorraghic stroke ischemia is most likely alternative.                           Plan - ischemic stroke workup, CT w/o and w/ contrast revealed no acute hemorrhage or high-grade stenosis, or large vessel occlusion, aneurysm or dissection. Rule out DVT with doppler study of lower extremities and echocardiogram with bubble study for patent foramen ovale. EKG for a-fib.  Allow for permissive hypertension and hold all antihypertensives at this time.                  Infectious - Pt, is diabetic which puts him at  higher risk of infection. Cannot rule out infectious insult as a result of patient neurological deficits. Meningitis and encephalitis can present with headache and focal neurological deficits.                          Plan - LP with CSF analysis and gram stain                 Neoplastic - Neoplastic origin of neurological deficit remains a possibility, however is somewhat more unlikely given patients negative CT scan                          Plan - MRI                 Degenerative - Pt has history of recurrent trauma and head injury. Neurological defects are associated with worsening and degenerative changes of lipohylinosis or atheroma formation from vascular insults.                          Plan - continue statin therapy and antiplatelet therapy of Aspirin 81.                  Iatrogenic/Intoxication - Pt has hx of recreational marijuana use, and while patient denies any other recreational drug use, acute hypoxemic brain injury is a possibility                          Plan - Urine toxicology                 Congenital - Pt has a family hx of migraines in his family on his mothers side. While the patient does not endorse any family hx of stroke, there is a possibility of AV malformations that could be present and causing vascular steal.                           Plan - MR A for abnormal flow                 Autoimmune -  Pt has white matter effacement on most recent imaging, and may represent progression of multiple sclerosis                          Plan - CSF analysis for oligoclonal bands and MRI for white matter effacement.                  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 Neurological deficits can be present as progression of subsequent brain injury.                           Plan - MRI                 Endocrine/Metabolic - Pt has history of DM, given patients history of DM, DKA or  severe hypoglycemia is a possibility.                          Plan A1c, BMP                Abdominal Discomfort - resolved on PPI therapy              Vascular - Pt reports mid epigastric pain, can be concerning for mesenteric ischemia.                           Plan - ABD CT A                 Infectious - may represent infection of H. Pylori                           Plan - Urease breath test and fecal antigen for H. Pylori                 Neoplastic - may be stomach neoplastic growth                          Plan - Endoscopic exam per GI                            Degenerative - pt is obese, and may have thoracic nerve compression manifesting as abdominal pain.                          Plan - MRI spine                 Inflammatory/Autoimmune - Gastroesophageal reflux disease, pt is overweight, which can be associated with GERD and the associated reflux to cause abdominal pain                          Plan - PPI therapy           Diabetes - continue metformin 1000 BID, and insulin regular 4x/day     Seizure disorder - continue divalproex, and topiramate 25 qd.      HTN - continue Lisinopril     Anxiety - started on sertraline 100 po qd, and buspirone 10mg PID.

## 2021-02-07 NOTE — PROGRESS NOTES
"Hospital Medicine Daily Progress Note    Date of Service  2/7/2021    Chief Complaint  40 y.o. male admitted 2/3/2021 with weakness    Hospital Course  40 y.o. male with past medical history of seizure disorder, psychiatric disorder, essential hypertension, diabetes mellitus who presented 2/3/2021 with right-sided weakness started around 1130 this morning.  Associated with tingling numbness sensation.  The patient has similar hospitalization in the past.  In the ER he has CT scan of the head was done and showed no acute finding.  Neurology was consulted and recommended the patient is not a TPA candidate and suspect that his symptom is more likely related to seizure.    Interval Problem Update  He is axox3, speech unchanged.  \"i\"m doing a little better, I'm starting to move my right side more and the feeling is starting to come back.\"  No seizure activity, denies h/a, no cp, no sob.  Exam remains inconsistent, witnessed walking with walker, slowed movement but lifting leg consistently and holding his body weight with his arms.  Discussed with case mgt - looking for group home without stairs      Consultants/Specialty  Neurology    Code Status  Full Code    Disposition  Group home    Review of Systems  Review of Systems   Constitutional: Negative.  Negative for chills, diaphoresis, fever, malaise/fatigue and weight loss.   HENT: Negative.  Negative for sore throat.    Eyes: Negative.  Negative for blurred vision.   Respiratory: Negative.  Negative for cough and shortness of breath.    Cardiovascular: Negative.  Negative for chest pain, palpitations and leg swelling.   Gastrointestinal: Negative.  Negative for abdominal pain, nausea and vomiting.   Genitourinary: Negative.  Negative for dysuria.   Musculoskeletal: Negative.  Negative for myalgias.   Skin: Negative.  Negative for itching and rash.   Neurological: Positive for sensory change, focal weakness and weakness. Negative for dizziness and headaches. "   Endo/Heme/Allergies: Negative.  Does not bruise/bleed easily.   Psychiatric/Behavioral: Negative.  Negative for depression, substance abuse and suicidal ideas.   All other systems reviewed and are negative.       Physical Exam  Temp:  [36.1 °C (97 °F)-37 °C (98.6 °F)] 36.4 °C (97.6 °F)  Pulse:  [60-70] 60  Resp:  [16-18] 16  BP: ()/(55-66) 105/65  SpO2:  [93 %-97 %] 93 %    Physical Exam  Vitals signs and nursing note reviewed. Exam conducted with a chaperone present.   Constitutional:       General: He is not in acute distress.     Appearance: Normal appearance. He is not diaphoretic.   HENT:      Head: Normocephalic.      Nose: Nose normal.      Mouth/Throat:      Mouth: Mucous membranes are moist.   Eyes:      Pupils: Pupils are equal, round, and reactive to light.      Comments: L eye chronic changes and blindness   Cardiovascular:      Rate and Rhythm: Normal rate and regular rhythm.      Pulses: Normal pulses.      Heart sounds: Normal heart sounds.   Pulmonary:      Effort: Pulmonary effort is normal.      Breath sounds: Normal breath sounds.   Abdominal:      General: Abdomen is flat. Bowel sounds are normal.      Palpations: Abdomen is soft.   Musculoskeletal: Normal range of motion.         General: No swelling or deformity.   Skin:     General: Skin is warm and dry.      Capillary Refill: Capillary refill takes less than 2 seconds.   Neurological:      General: No focal deficit present.      Mental Status: He is alert and oriented to person, place, and time.      Cranial Nerves: No cranial nerve deficit.      Sensory: Sensory deficit present.      Motor: Weakness present.      Comments: Exam remains inconsistent, see above, left 5/5, right varies, witnessed pt overcome gravity with both left arm and leg     Psychiatric:         Mood and Affect: Mood normal.         Behavior: Behavior normal.         Fluids    Intake/Output Summary (Last 24 hours) at 2/7/2021 1524  Last data filed at 2/7/2021  0600  Gross per 24 hour   Intake 340 ml   Output 800 ml   Net -460 ml       Laboratory      Recent Labs     02/05/21  0304   SODIUM 136   POTASSIUM 4.0   CHLORIDE 100   CO2 23   GLUCOSE 129*   BUN 23*   CREATININE 1.17   CALCIUM 9.7                   Imaging  MR-BRAIN-WITH & W/O   Final Result      1.  Unchanged severe supratentorial leukoencephalopathy. Seen on prior exams dating back to 2015.   2.  Mild cerebral atrophy.   3.  No acute findings.      DX-CHEST-PORTABLE (1 VIEW)   Final Result      No acute cardiopulmonary abnormality.      CT-CTA NECK WITH & W/O-POST PROCESSING   Final Result      1.  No high-grade stenosis, large vessel occlusion, aneurysm or dissection.   2.  A 3.4 cm heterogeneously enhancing mass in the neck, at the level of the thyroid cartilage. It may represent an ectopic thyroid tissue/nodule. It has grown slightly since 2015 study. Further evaluation with ultrasound is advised on an outpatient    basis.      CT-CTA HEAD WITH & W/O-POST PROCESS   Final Result      1.  CT angiogram of the [place] within normal limits.      2.  Abnormal low attenuation in the periventricular white matter again noted.      CT-HEAD W/O   Final Result      1.  No acute intracranial hemorrhage.      2.  Diffuse confluent periventricular hypodensity as on the prior MRI is consistent with extensive chronic demyelination or dysmyelination as described previously.              Assessment/Plan  ANA (acute kidney injury) (HCC)- (present on admission)  Assessment & Plan  In baseline range    Acute right-sided weakness- (present on admission)  Assessment & Plan  CT scan of the head and CT angiogram head and neck showed no acute finding  See above  On aspirin and statin  Exam remains inconsistent  Neurology following   EEG is abnormal - anti seizure meds restarted, unclear why they were stopped on admit   Discussed with neuro and the meds being held on admit are the likely etiology - recommendation to continue  current regimen  MRI shows no acute changes  Continue to follow, slowly improving  Functional component, see previous notes and psych consult  Continue PT/OT  Case mgt looking into a group home with no stairs      Seizure disorder (HCC)- (present on admission)  Assessment & Plan  Seizure precaution, aspiration precaution  Eeg abnormal  Neurology following  Restart Depakote and Topamax    Type 2 diabetes mellitus with hyperglycemia, without long-term current use of insulin (HCC)- (present on admission)  Assessment & Plan  Sliding scale insulin and hypoglycemic protocol    Conversion disorder  Assessment & Plan  Suspected  Seen by psychiatry who has also seen him for this in the past  They recommend phychologic therapy which is currently not available in the acute hospital    Hypokalemia  Assessment & Plan  resolved    Type 2 diabetes mellitus without complication, without long-term current use of insulin (HCC)- (present on admission)  Assessment & Plan  A1c 9  SSI and hypoglycemic protocol    Psychiatric problem- (present on admission)  Assessment & Plan  Unclear if psychologic stress is contributing to his sx given inconsistent exam  Continue supportive care       VTE prophylaxis: lovenox

## 2021-02-07 NOTE — THERAPY
Physical Therapy   Daily Treatment     Patient Name: Norm Prabhakar  Age:  40 y.o., Sex:  male  Medical Record #: [id number]  Today's Date: 2/7/2021     Precautions: Fall Risk    Assessment  Pt seen for PT treatment session. Pt reports he is unable to use AD at his group home, however was unable to ambulate without 4WW today. When using 4WW, pt able to ambulate with SPV and with VCs to exaggerate R hip and knee flexion, pt is able to achieve R foot clearance for step-through gait, very slow vera. Pt uses L hand to bring R hand to walker and is able to maintain R . Did attempt gait without AD, pt unable to initiate stepping and demonstrates B knee buckling/bouncing. No buckling when ambulating with walker. Pt incontinent of bowel during session, was unable to attempt pericare on his own and reports cannot reach with L hand. Initiated stair training: with VCs for technique and B rails pt was able to ascend 3 steps without LOB but reports fatigue and declined further stair negotiation. Pt has been admitted with similar sxs in the past and has hx of conversion disorder. Pt may benefit from group home where he is allowed to have AD and can live on first floor without having to negotiate stairs. Given current presentation, pt appears to have functional capability to complete tasks on his own but d/t safety concerns currently requires close SPV or CGA. Pt was unable to complete FOS or ambulate without AD for return to his group home. Will follow.     Plan  Continue current treatment plan.  DC Equipment Recommendations: Front-Wheel Walker  Discharge Recommendations: Recommend post-acute placement for additional physical therapy services prior to discharge home (see summary above. Postacute placement vs LTC vs group home with incr CG assistance)       02/07/21 1126   Cognition    Speech/ Communication Delayed Responses;Slurred   Level of Consciousness Alert   Initiation Impaired   Comments pleasant and cooperative.  poor initiation with R side but does demo ability to use   Other Treatments   Other Treatments Provided incontinent of bowel while ambulating to bathroom. time spent for clean-up for floor, equipment, and patient (legs, socks, hygeine)   Balance   Sitting Balance (Static) Good   Sitting Balance (Dynamic) Good   Standing Balance (Static) Fair   Standing Balance (Dynamic) Fair -   Weight Shift Sitting Fair   Weight Shift Standing Fair   Comments standing with 4WW   Gait Analysis   Gait Level Of Assist Supervised   Assistive Device 4 Wheel Walker   Distance (Feet) 25   # of Times Distance Traveled 2   Deviation Bradykinetic;Decreased Heel Strike;Decreased Toe Off  (decr R foot clearance)   # of Stairs Climbed 3   Level of Assist with Stairs CGA   Weight Bearing Status no restrictions   Comments does well with cues to exaggerate R hip and knee flexion. pt was able to amb with reciprocal gait and without sliding R foot. R hand  walker and is able to maintain . attempted ambulating without walker: pt with immediate B knee buckling/bouncing and pt unable to initiate steps. no buckling with walker. pt with B rails on stairs and VCs for sequencing step-to on stairs. pt reports LE fatigue and declines further stair negotiation.    Bed Mobility    Supine to Sit Supervised   Sit to Supine Minimal Assist   Scooting Supervised   Comments cues to hook LLE under RLE to assist BTB, still required therapist assist   Functional Mobility   Sit to Stand Supervised   Toilet Transfers Supervised   Comments close SPV for safety but does not require physical assist   Short Term Goals    Short Term Goal # 1 Patient will be able to demonstrate bed mobility SPV in 6 visits in order to incrs fxnl ind   Goal Outcome # 1 Progressing as expected   Short Term Goal # 2 Patient will be able to perform transfers SPV in 6 visits in order to incrs fxnl ind   Goal Outcome # 2 Goal met   Short Term Goal # 3 Patient will be able to perform amb  x200' SPV in 6 visits in order to incrs fxnl ind   Goal Outcome # 3 Progressing slower than expected   Short Term Goal # 4 Patient will be able to negotiate x10 stairs with SPV in 6 visits in order to incrs fxnal ind   Goal Outcome # 4 Progressing slower than expected

## 2021-02-07 NOTE — CARE PLAN
Problem: Communication  Goal: The ability to communicate needs accurately and effectively will improve  Outcome: PROGRESSING AS EXPECTED  Note: Pt A&OX4, Q4 neuro Checks in place. Follows commands and responds appropriately, will continue to round hourly, encourage the Pt to ask questions and voice feelings.       Problem: Safety  Goal: Will remain free from falls  Outcome: PROGRESSING AS EXPECTED  Note: PT educated to call for assistance, Non-skid socks, bed alarm, call light provided. Will continue to monitor Q1hr rounding. Q4 Neuro Checks per stroke protocol in place.

## 2021-02-08 VITALS
HEART RATE: 64 BPM | TEMPERATURE: 97.1 F | WEIGHT: 290.13 LBS | RESPIRATION RATE: 14 BRPM | OXYGEN SATURATION: 92 % | BODY MASS INDEX: 33.53 KG/M2 | SYSTOLIC BLOOD PRESSURE: 96 MMHG | DIASTOLIC BLOOD PRESSURE: 58 MMHG

## 2021-02-08 PROBLEM — E87.6 HYPOKALEMIA: Status: RESOLVED | Noted: 2021-02-04 | Resolved: 2021-02-08

## 2021-02-08 PROBLEM — N17.9 AKI (ACUTE KIDNEY INJURY) (HCC): Status: RESOLVED | Noted: 2020-03-01 | Resolved: 2021-02-08

## 2021-02-08 LAB
GLUCOSE BLD-MCNC: 161 MG/DL (ref 65–99)
GLUCOSE BLD-MCNC: 196 MG/DL (ref 65–99)

## 2021-02-08 PROCEDURE — 99217 PR OBSERVATION CARE DISCHARGE: CPT | Performed by: HOSPITALIST

## 2021-02-08 PROCEDURE — 90471 IMMUNIZATION ADMIN: CPT

## 2021-02-08 PROCEDURE — 96372 THER/PROPH/DIAG INJ SC/IM: CPT | Mod: XU

## 2021-02-08 PROCEDURE — 97535 SELF CARE MNGMENT TRAINING: CPT

## 2021-02-08 PROCEDURE — 700102 HCHG RX REV CODE 250 W/ 637 OVERRIDE(OP): Performed by: HOSPITALIST

## 2021-02-08 PROCEDURE — A9270 NON-COVERED ITEM OR SERVICE: HCPCS | Performed by: HOSPITALIST

## 2021-02-08 PROCEDURE — G0378 HOSPITAL OBSERVATION PER HR: HCPCS

## 2021-02-08 PROCEDURE — A9270 NON-COVERED ITEM OR SERVICE: HCPCS | Performed by: INTERNAL MEDICINE

## 2021-02-08 PROCEDURE — 90686 IIV4 VACC NO PRSV 0.5 ML IM: CPT | Performed by: HOSPITALIST

## 2021-02-08 PROCEDURE — 700102 HCHG RX REV CODE 250 W/ 637 OVERRIDE(OP): Performed by: INTERNAL MEDICINE

## 2021-02-08 PROCEDURE — 700111 HCHG RX REV CODE 636 W/ 250 OVERRIDE (IP): Performed by: HOSPITALIST

## 2021-02-08 PROCEDURE — 82962 GLUCOSE BLOOD TEST: CPT | Mod: 91

## 2021-02-08 RX ADMIN — FAMOTIDINE 10 MG: 20 TABLET ORAL at 04:36

## 2021-02-08 RX ADMIN — LEVOTHYROXINE SODIUM 225 MCG: 0.2 TABLET ORAL at 06:00

## 2021-02-08 RX ADMIN — LISINOPRIL 10 MG: 10 TABLET ORAL at 04:35

## 2021-02-08 RX ADMIN — INSULIN HUMAN 1 UNITS: 100 INJECTION, SOLUTION PARENTERAL at 13:29

## 2021-02-08 RX ADMIN — BUSPIRONE HYDROCHLORIDE 10 MG: 10 TABLET ORAL at 04:37

## 2021-02-08 RX ADMIN — DIVALPROEX SODIUM 500 MG: 500 TABLET, DELAYED RELEASE ORAL at 04:35

## 2021-02-08 RX ADMIN — TOPIRAMATE 25 MG: 25 TABLET, FILM COATED ORAL at 04:38

## 2021-02-08 RX ADMIN — METFORMIN HYDROCHLORIDE 1000 MG: 500 TABLET ORAL at 07:46

## 2021-02-08 RX ADMIN — INSULIN HUMAN 1 UNITS: 100 INJECTION, SOLUTION PARENTERAL at 07:52

## 2021-02-08 RX ADMIN — INFLUENZA A VIRUS A/GUANGDONG-MAONAN/SWL1536/2019 CNIC-1909 (H1N1) ANTIGEN (FORMALDEHYDE INACTIVATED), INFLUENZA A VIRUS A/HONG KONG/2671/2019 (H3N2) ANTIGEN (FORMALDEHYDE INACTIVATED), INFLUENZA B VIRUS B/PHUKET/3073/2013 ANTIGEN (FORMALDEHYDE INACTIVATED), AND INFLUENZA B VIRUS B/WASHINGTON/02/2019 ANTIGEN (FORMALDEHYDE INACTIVATED) 0.5 ML: 15; 15; 15; 15 INJECTION, SUSPENSION INTRAMUSCULAR at 13:26

## 2021-02-08 RX ADMIN — BUDESONIDE AND FORMOTEROL FUMARATE DIHYDRATE 2 PUFF: 160; 4.5 AEROSOL RESPIRATORY (INHALATION) at 04:38

## 2021-02-08 RX ADMIN — ASPIRIN 81 MG: 81 TABLET, COATED ORAL at 04:37

## 2021-02-08 RX ADMIN — SERTRALINE HYDROCHLORIDE 100 MG: 100 TABLET ORAL at 04:38

## 2021-02-08 RX ADMIN — BUSPIRONE HYDROCHLORIDE 10 MG: 10 TABLET ORAL at 12:30

## 2021-02-08 ASSESSMENT — COGNITIVE AND FUNCTIONAL STATUS - GENERAL
DAILY ACTIVITIY SCORE: 18
DRESSING REGULAR UPPER BODY CLOTHING: A LITTLE
SUGGESTED CMS G CODE MODIFIER DAILY ACTIVITY: CK
HELP NEEDED FOR BATHING: A LITTLE
TOILETING: A LITTLE
DRESSING REGULAR LOWER BODY CLOTHING: A LITTLE
PERSONAL GROOMING: A LITTLE
EATING MEALS: A LITTLE

## 2021-02-08 NOTE — DISCHARGE INSTRUCTIONS
Discharge Instructions    Discharged to group home by taxi with self. Discharged via wheelchair, hospital escort: Yes.  Special equipment needed: Walker    Be sure to schedule a follow-up appointment with your primary care doctor or any specialists as instructed.     Discharge Plan:   Diet Plan: Discussed  Activity Level: Discussed  Confirmed Follow up Appointment: Appointment Scheduled  Confirmed Symptoms Management: Discussed  Medication Reconciliation Updated: Yes    I understand that a diet low in cholesterol, fat, and sodium is recommended for good health. Unless I have been given specific instructions below for another diet, I accept this instruction as my diet prescription.   Other diet: Soft foods L6    Special Instructions: None    · Is patient discharged on Warfarin / Coumadin?   No     Depression / Suicide Risk    As you are discharged from this [facility], it is important to learn how to keep safe from harming yourself.    Recognize the warning signs:  · Abrupt changes in personality, positive or negative- including increase in energy   · Giving away possessions  · Change in eating patterns- significant weight changes-  positive or negative  · Change in sleeping patterns- unable to sleep or sleeping all the time   · Unwillingness or inability to communicate  · Depression  · Unusual sadness, discouragement and loneliness  · Talk of wanting to die  · Neglect of personal appearance   · Rebelliousness- reckless behavior  · Withdrawal from people/activities they love  · Confusion- inability to concentrate     If you or a loved one observes any of these behaviors or has concerns about self-harm, here's what you can do:  · Talk about it- your feelings and reasons for harming yourself  · Remove any means that you might use to hurt yourself (examples: pills, rope, extension cords, firearm)  · Get professional help from the community (Mental Health, Substance Abuse, psychological counseling)  · Do not  be alone:Call your Safe Contact- someone whom you trust who will be there for you.  · Call your local CRISIS HOTLINE 219-9147 or 755-757-9590  · Call your local Children's Mobile Crisis Response Team [place] (322) 574-5824 or www.SeeOn  · Call the toll free National Suicide Prevention Hotlines   · National Suicide Prevention Lifeline 965-153-EIZB (8430)  · [place] Hope Line Network 800-SUICIDE (984-8206)        Conversion Disorder  Conversion disorder is also known as functional neurological symptom disorder. It is a mental (psychiatric) condition in which a person shows symptoms of a problem with the brain, spinal cord, or nerves (nervous system), such as a stroke or seizure. However, these symptoms cannot be explained by a medical condition. This means that the symptoms are real, but the condition is not caused by an injury to the nervous system.  It is important to note that the symptoms are not faked. Rather, they point to a person's underlying depression, stress, or anxiety. These symptoms are often started (triggered) by a recent or past stressful event.  Conversion disorder may begin anytime from late childhood through the young adult years. This disorder is more common in females than males.  What are the causes?  The exact cause of this disorder is not known.  What increases the risk?  You are more likely to develop this condition if:  · You are in a very stressful situation.  · You have a psychiatric condition, such as depression, anxiety, or bipolar disorder.  · You have a personality disorder, such as histrionic personality disorder, borderline personality disorder, or narcissistic personality disorder.  · You have a history of childhood abuse.  What are the signs or symptoms?  Symptoms of this condition may include:  · Muscle weakness or paralysis. If this involves the bladder muscle, it can cause difficulty urinating or loss of bladder control.  · Spells of uncontrollable shaking or  spells that look like seizures (pseudoseizures).  · Abnormal movements, such as tremors, jerks, muscle spasms, loss of balance, or difficulty walking.  · Difficulty swallowing, or a feeling of having a lump in the throat.  · Loss of voice (aphonia), stuttering, or a struggle with speech movements (dysarthria).  · Loss of the sensation of touch or pain.  · Changes in vision, such as blindness or double vision.  · Seeing or hearing things that are not there (hallucinations).  · Changes in hearing, including deafness.  Symptoms usually occur suddenly, often around times of intense stress.  How is this diagnosed?  This condition is diagnosed based on an exam by your health care provider. Exams and tests will also be done to make sure you do not have serious physical health problems. These exams and tests will vary depending on your specific symptoms. They may include:  · A physical exam. This involves tests to check for problems with the brain or another part of the nervous system (neurological exam).  · Blood and urine tests.  · X-rays or other imaging studies.  · An electroencephalogram (EEG). This monitors brain waves to look for seizures.  Your health care provider may refer you to a mental health specialist for more tests.  How is this treated?  The main goal of treatment is to get the symptoms to go away as soon as possible. For most people, this condition may be treated with:  · Relaxation techniques.  · Reassurance from the health care provider that the symptoms are stress-related.  For people with symptoms that do not go away, treatment options may include:  · Counseling. Acceptance of the diagnosis is the first step toward successful treatment of the disorder.  · Medicine. Antidepressant medicine may be helpful even if a person with conversion symptoms is not depressed.  · Cognitive behavioral therapy (CBT). This involves discussing stressful life events that may have triggered the symptoms and talking about  ways to cope with the stress.  · Physical therapy (PT) and occupational therapy (OT). OT can help to restore strength, and PT can improve walking if your strength and the way that you walk (gait) have been affected.  · Amobarbital interview (rare). This involves discussing stressful life events that may have triggered the symptoms. The mental health specialist asks you questions after you take a short-acting medicine called amobarbital. This medicine puts a person in a hypnotic state.  Follow these instructions at home:  · Take over-the-counter and prescription medicines only as told by your health care provider.  · Work with your health care provider to identify and reduce stress (use stress management).  · Keep all follow-up visits as told by your health care provider. This is important. Follow-up visits may include counseling or physical therapy sessions.  Contact a health care provider if:  · Your symptoms do not go away or they become worse.  · You develop new symptoms.  Get help right away if:  · You have serious thoughts about hurting yourself or someone else.  If you ever feel like you may hurt yourself or others, or have thoughts about taking your own life, get help right away. You can go to your nearest emergency department or call:  · Your local emergency services (911 in the [place]  · A suicide crisis helpline, such as the National Suicide Prevention Lifeline at 1-360.219.1674. This is open 24 hours a day.  Summary  · Conversion disorder is a mental (psychiatric) disorder with symptoms that wrongly suggest an injury to the brain, spinal cord, or nerves (nervous system).  · Symptoms of conversion disorder may include weakness, numbness, and spells that look like seizures.  · Treatment may involve counseling for stress management, talk therapy, medicines, and physical or occupational therapy.  · If you ever feel like you may hurt yourself or others, or have thoughts about taking your own life, get help  right away.  This information is not intended to replace advice given to you by your health care provider. Make sure you discuss any questions you have with your health care provider.  Document Released: 01/20/2012 Document Revised: 11/30/2018 Document Reviewed: 10/08/2018  Elsevier Patient Education © 2020 [employer]

## 2021-02-08 NOTE — DISCHARGE PLANNING
Msg placed to Dr. Bush to inform of Brain MRI results are available.     1021-Dr. Bush is recommending SNF or a return to .  Zoey SHOOK is aware.  [facility] will no longer follow.  Please reach out to myself @ [phone] with any questions.

## 2021-02-08 NOTE — DISCHARGE PLANNING
Anticipated Discharge Disposition:     Action: Lsw spoke with PT who stated that pt was worked with yesterday and may not have enough to see pt today. From yesterday pt was having difficulty with his right leg. PT's recommendation was to have pt in  with no stairs. Saurabhw spoke with pt's BOUCHRA Tuttle at [facility] [phone] to explain situation. Marry stated that pt's apt should be on the first floor and if not, they would be able to have pt placed and should not take longer than an hour. Mau updated Marry that pt will discharge today possibly around [id number], Marry verbalized agreement. Saurabhw updated RN.     Barriers to Discharge: none    Plan: Saurabhw to f/u with team

## 2021-02-08 NOTE — DISCHARGE PLANNING
Anticipated Discharge Disposition: [facility]    Action: Lsw received choice for FWW from pt at bedside. Lsw provided RN with cab voucher #[id number]    Barriers to Discharge: none

## 2021-02-08 NOTE — CARE PLAN
Pt discharged back to group home. PIV removed, catheter intact. Belongings and new front wheel walker verified with patient. Taken out to cab in wheelchair.

## 2021-02-08 NOTE — THERAPY
Occupational Therapy  Daily Treatment     Patient Name: Norm Prabhakar  Age:  40 y.o., Sex:  male  Medical Record #: [id number]  Today's Date: 2/8/2021     Precautions  Precautions: Fall Risk  Comments: Hx of conversion disorder, R side weakness    Assessment    Pt seen today for OT tx. Patient presented with fluctuating use of R UE and LE. He was able to use R UE w/ assist to actively hook/pull up underwear and to hold onto the FWW. He also was able to assist with moving R LE off bed. OT still recommends rehab after acute care stay.     Plan    Continue current treatment plan.    DC Equipment Recommendations: Unable to determine at this time  Discharge Recommendations: Recommend post-acute placement for additional occupational therapy services prior to discharge home    Subjective    Pleasant and cooperative     Objective       02/08/21 0846   Precautions   Precautions Fall Risk   Comments Hx of conversion disorder, R side weakness   Pain   Intervention Declines   Pain 0 - 10 Group   Pain Rating Scale (NPRS) 0   Therapist Pain Assessment 0;Nurse Notified   Cognition    Cognition / Consciousness X   Speech/ Communication Delayed Responses   Level of Consciousness Alert   Comments inconsistent use of R UE/LE   Strength Upper Body   Upper Body Strength  X   Comments fluctuating use of R UE, able to grab walker/assist with pulling up underwear using R UE   Other Treatments   Other Treatments Provided educated on martinez dressing for UB and LB   Balance   Sitting Balance (Static) Good   Sitting Balance (Dynamic) Good   Standing Balance (Static) Fair -   Standing Balance (Dynamic) Fair -   Weight Shift Sitting Fair   Weight Shift Standing Fair   Skilled Intervention Verbal Cuing;Facilitation   Comments use FWW   Bed Mobility    Supine to Sit Minimal Assist  (for help w/ R LE/cues to position R UE)   Scooting Supervised   Skilled Intervention Verbal Cuing;Sequencing;Facilitation;Tactile Cuing   Activities of Daily Living    Upper Body Dressing Supervision  (cues for flakito dressing)   Lower Body Dressing Minimal Assist  (don underwear using flakito dressing techniques)   Skilled Intervention Verbal Cuing;Tactile Cuing;Facilitation;Sequencing;Compensatory Strategies   Comments able to use R hand w/ assist to actively hook/pull up underwear on R side; required Min A for LB dressing and cued to use L LE to hook R LE to pull underwear over R foot   Functional Mobility   Sit to Stand Supervised   Bed, Chair, Wheelchair Transfer Minimal Assist  (w/ FWW, Min A for cues to guide FWW/safety)   Transfer Method Stand Step   Mobility EOB>chair   Skilled Intervention Verbal Cuing;Tactile Cuing;Sequencing;Facilitation   Comments using FWW, min A for safety and cues   Activity Tolerance   Sitting in Chair   (ended session in chair)   Sitting Edge of Bed ~15 min   Standing <2 min   Comments w/ FWW   Patient / Family Goals   Patient / Family Goal #1 to go home   Goal #1 Outcome Progressing as expected   Short Term Goals   Short Term Goal # 1 pt will demo toilet txf with supv   Goal Outcome # 1 Progressing as expected   Short Term Goal # 2 pt will dress LB with supv   Goal Outcome # 2 Progressing as expected   Short Term Goal # 3 pt will dress UB with supv   Goal Outcome # 3 Progressing as expected   Short Term Goal # 4 pt will groom in stance w/ supv   Goal Outcome # 4 Goal not met   Education Group   Education Provided Role of Occupational Therapist;Activities of Daily Living;Transfers;Other (comments)  (Flakito dressing)   Role of Occupational Therapist Patient Response Patient;Acceptance;Explanation;Verbal Demonstration   Transfers Patient Response Patient;Acceptance;Explanation;Demonstration;Verbal Demonstration;Action Demonstration   ADL Patient Response Patient;Eager;Acceptance;Explanation;Demonstration;Verbal Demonstration;Action Demonstration   Anticipated Discharge Equipment and Recommendations   DC Equipment Recommendations Unable to determine at  this time   Discharge Recommendations Recommend post-acute placement for additional occupational therapy services prior to discharge home   Interdisciplinary Plan of Care Collaboration   IDT Collaboration with  Nursing   Patient Position at End of Therapy Call Light within Reach;Tray Table within Reach;Seated;Chair Alarm On   Collaboration Comments report given

## 2021-02-08 NOTE — DISCHARGE SUMMARY
Discharge Summary    CHIEF COMPLAINT ON ADMISSION  Chief Complaint   Patient presents with   • Weakness       Reason for Admission  Pawan     Admission Date  2/3/2021    CODE STATUS  Full Code    HPI & HOSPITAL COURSE  40 y.o. male with past medical history of seizure disorder, psychiatric disorder, essential hypertension, diabetes mellitus who presented 2/3/2021 with right-sided weakness started around 1130 this morning.  Associated with tingling numbness sensation.  The patient has similar hospitalization in the past.  CT scan of the head was done and showed no acute finding and MRI showed no acute findings.  An EEG was abnormal but for some reason his chronic anti seizure medications were held and the EEG was done without these medications on board.  His previous medications were then resumed and he had no evidence of seizure activity. He was followed by neurology who recommended no further work up or medication changes.  His exam was consistently inconsistent with pt stating he was unable to move his right side but repeatedly, especially when he was unaware he was being observed he showed normal strength and dexterity of the right side, using his eye phone and computer with intact gross and fine motor skills and ambulating.  Neurology and psychiatry both agreed that his complaints were consistent with conversion disorder, for which he has also been diagnosed with in the past.  He has been referred to behavioral health for psychological support and counseling and will be discharged back to his group home with a walker.  He can return to the ER if needed.     Therefore, he is discharged in and stable condition to home with organized home healthcare and close outpatient follow-up.    The patient met 2-midnight criteria for an inpatient stay at the time of discharge.    Discharge Date  2/8/21    FOLLOW UP ITEMS POST DISCHARGE  Pcp  Neurology  Behavioral Health    DISCHARGE DIAGNOSES  Active Problems:    Type 2  diabetes mellitus with hyperglycemia, without long-term current use of insulin ([facility]) POA: Yes    Seizure disorder ([facility]) POA: Yes    Type 2 diabetes mellitus without complication, without long-term current use of insulin ([facility]) POA: Yes    Conversion disorder POA: Unknown    Psychiatric problem POA: Yes      Overview: PTSD  Resolved Problems:    Acute right-sided weakness POA: Yes    ANA (acute kidney injury) ([facility]) POA: Yes    Hypokalemia POA: Unknown      FOLLOW UP  No future appointments.  ANIKET Davis  [address] [facility] [address]  [phone]    Today      Behavioral Health Outpatient  [address]  [facility] [address] [phone]  In 1 week        MEDICATIONS ON DISCHARGE     Medication List      CONTINUE taking these medications      Instructions   acetaminophen 500 MG Tabs  Commonly known as: TYLENOL   Take 1,000 mg by mouth 2 times a day as needed (headache).  Dose: 1,000 mg     aspirin EC 81 MG Tbec  Commonly known as: ECOTRIN   Take 81 mg by mouth every morning.  Dose: 81 mg     atorvastatin 80 MG tablet  Commonly known as: LIPITOR   Take 80 mg by mouth every evening.  Dose: 80 mg     busPIRone 10 MG Tabs tablet  Commonly known as: BUSPAR   Take 10 mg by mouth 3 times a day.  Dose: 10 mg     Fenofibrate 134 MG Caps   Take 134 mg by mouth every evening.  Dose: 134 mg     fish oil 1000 MG Caps capsule   Take 1,000 mg by mouth 2 Times a Day.  Dose: 1,000 mg     ibuprofen 600 MG Tabs  Commonly known as: MOTRIN   Take 600 mg by mouth 2 (two) times a day.  Dose: 600 mg     insulin glargine 100 UNIT/ML injection PEN   Inject 23 Units under the skin 2 (two) times a day. Decrease to 20 units for BG less than 90  Dose: 23 Units     Jardiance 25 MG Tabs  Generic drug: Empagliflozin   Take 25 mg by mouth every day.  Dose: 25 mg     Latuda 20 MG Tabs  Generic drug: lurasidone   Take 20 mg by mouth with dinner.  Dose: 20 mg     levothyroxine 200 MCG Tabs  Commonly known as: SYNTHROID   Take 200  mcg by mouth Every morning on an empty stomach.  Dose: 200 mcg     lidocaine 5 % Ptch  Commonly known as: LIDODERM   Place 1 Patch on the skin every 24 hours.  Dose: 1 Patch     lisinopril 10 MG Tabs  Commonly known as: PRINIVIL   Take 10 mg by mouth every day.  Dose: 10 mg     metformin 1000 MG tablet  Commonly known as: GLUCOPHAGE   Take 1,000 mg by mouth 2 times a day, with meals.  Dose: 1,000 mg     montelukast 10 MG Tabs  Commonly known as: SINGULAIR   Take 10 mg by mouth every bedtime.  Dose: 10 mg     ondansetron 4 MG Tbdp  Commonly known as: Zofran ODT   Take 1 Tab by mouth every 6 hours as needed.  Dose: 4 mg     polyethylene glycol 3350 17 GM/SCOOP Powd  Commonly known as: MiraLax   Take 17 g by mouth every day.  Dose: 17 g     prazosin 2 MG Caps  Commonly known as: MINIPRESS   Take 4 mg by mouth every bedtime.  Dose: 4 mg     sertraline 100 MG Tabs  Commonly known as: Zoloft   Take 100 mg by mouth every day.  Dose: 100 mg     * topiramate 25 MG Tabs  Commonly known as: TOPAMAX   Take 25 mg by mouth every day.  Dose: 25 mg     * topiramate 25 MG Tabs  Commonly known as: TOPAMAX   Take 1 Tab by mouth 2 times a day.  Dose: 25 mg     Vitamin D3 2000 UNIT Caps   Take 4,000 Units by mouth every bedtime.  Dose: 4,000 Units         * This list has 2 medication(s) that are the same as other medications prescribed for you. Read the directions carefully, and ask your doctor or other care provider to review them with you.            STOP taking these medications    ketorolac 10 MG Tabs  Commonly known as: TORADOL        ASK your doctor about these medications      Instructions   * divalproex 500 MG Tbec  Commonly known as: DEPAKOTE  Ask about: Which instructions should I use?   Take 500-1,500 mg by mouth 2 (two) times a day. 500 mg AM  1500 mg PM  Dose: 500-1,500 mg     * divalproex 500 MG Tbec  Commonly known as: DEPAKOTE  Ask about: Which instructions should I use?   Take 2 Tabs by mouth every morning.  Dose: 1,000  "mg     * divalproex 500 MG Tbec  Commonly known as: Depakote  Ask about: Which instructions should I use?   Take 3 Tabs by mouth every evening.  Dose: 1,500 mg     Symbicort 160-4.5 MCG/ACT Aero  Generic drug: budesonide-formoterol  Ask about: Which instructions should I use?   Inhale 2 Puffs 2 Times a Day.  Dose: 2 Puff         * This list has 3 medication(s) that are the same as other medications prescribed for you. Read the directions carefully, and ask your doctor or other care provider to review them with you.                Allergies  Allergies   Allergen Reactions   • Geodon [Ziprasidone Hcl] Anaphylaxis     Anaphylaxis per patient   • Abilify      \"Feeling tired, like I don't even know whats going on around me\"   • Fish      Pt reports fish causes him to be sick to his stomach  Not listed on MAR noted 2/3/2021         DIET  Diabetic  Orders Placed This Encounter   Procedures   • Diet Order Diet: Level 6 - Soft and Bite Sized (currently requires assisted set-up); Liquid level: Level 0 - Thin; Tray Modifications (optional): No Straws, SLP - Deliver to Nursing Station     Standing Status:   Standing     Number of Occurrences:   1     Order Specific Question:   Diet:     Answer:   Level 6 - Soft and Bite Sized [23]     Comments:   currently requires assisted set-up     Order Specific Question:   Liquid level     Answer:   Level 0 - Thin     Order Specific Question:   Tray Modifications (optional)     Answer:   No Straws     Order Specific Question:   Tray Modifications (optional)     Answer:   SLP - Deliver to Nursing Station       ACTIVITY  As tolerated.  Weight bearing as tolerated    CONSULTATIONS  Neurology  Psychiatry    PROCEDURES  MR-BRAIN-WITH & W/O   Final Result      1.  Unchanged severe supratentorial leukoencephalopathy. Seen on prior exams dating back to 2015.   2.  Mild cerebral atrophy.   3.  No acute findings.      DX-CHEST-PORTABLE (1 VIEW)   Final Result      No acute cardiopulmonary " abnormality.      CT-CTA NECK WITH & W/O-POST PROCESSING   Final Result      1.  No high-grade stenosis, large vessel occlusion, aneurysm or dissection.   2.  A 3.4 cm heterogeneously enhancing mass in the neck, at the level of the thyroid cartilage. It may represent an ectopic thyroid tissue/nodule. It has grown slightly since 2015 study. Further evaluation with ultrasound is advised on an outpatient    basis.      CT-CTA HEAD WITH & W/O-POST PROCESS   Final Result      1.  CT angiogram of the [place] within normal limits.      2.  Abnormal low attenuation in the periventricular white matter again noted.      CT-HEAD W/O   Final Result      1.  No acute intracranial hemorrhage.      2.  Diffuse confluent periventricular hypodensity as on the prior MRI is consistent with extensive chronic demyelination or dysmyelination as described previously.               LABORATORY  Lab Results   Component Value Date    SODIUM 136 02/05/2021    POTASSIUM 4.0 02/05/2021    CHLORIDE 100 02/05/2021    CO2 23 02/05/2021    GLUCOSE 129 (H) 02/05/2021    BUN 23 (H) 02/05/2021    CREATININE 1.17 02/05/2021        Lab Results   Component Value Date    WBC 8.5 02/04/2021    HEMOGLOBIN 12.6 (L) 02/04/2021    HEMATOCRIT 39.3 (L) 02/04/2021    PLATELETCT 253 02/04/2021        Total time of the discharge process exceeds 45 minutes.

## 2021-02-08 NOTE — DISCHARGE PLANNING
Received Choice form at 1167  Agency/Facility Name: [facility]  Referral sent per Choice form @ [phone]

## 2021-02-08 NOTE — CARE PLAN
Problem: Communication  Goal: The ability to communicate needs accurately and effectively will improve  Outcome: PROGRESSING AS EXPECTED   Patient is able to communicate his needs effectively.  Problem: Bowel/Gastric:  Goal: Normal bowel function is maintained or improved  Outcome: PROGRESSING SLOWER THAN EXPECTED  Patient has had multiple loose bowel movements during day shift today and during this shift.

## 2021-02-09 ENCOUNTER — PATIENT OUTREACH (OUTPATIENT)
Dept: HEALTH INFORMATION MANAGEMENT | Facility: OTHER | Age: 41
End: 2021-02-09

## 2021-02-09 NOTE — PROGRESS NOTES
Per chart, pt discharged to [facility] housing. CHW reached out to Marry ([phone]) at [facility]. She reports PCP appt will be set up as well as transportation. CCM will no longer follow.

## 2021-02-16 ENCOUNTER — HOSPITAL ENCOUNTER (EMERGENCY)
Facility: MEDICAL CENTER | Age: 41
End: 2021-02-16
Attending: EMERGENCY MEDICINE
Payer: MEDICAID

## 2021-02-16 VITALS
TEMPERATURE: 97.8 F | DIASTOLIC BLOOD PRESSURE: 83 MMHG | OXYGEN SATURATION: 97 % | HEART RATE: 84 BPM | HEIGHT: 78 IN | BODY MASS INDEX: 34.61 KG/M2 | RESPIRATION RATE: 18 BRPM | SYSTOLIC BLOOD PRESSURE: 146 MMHG | WEIGHT: 299.16 LBS

## 2021-02-16 DIAGNOSIS — G89.29 CHRONIC THORACIC BACK PAIN, UNSPECIFIED BACK PAIN LATERALITY: ICD-10-CM

## 2021-02-16 DIAGNOSIS — M54.6 CHRONIC THORACIC BACK PAIN, UNSPECIFIED BACK PAIN LATERALITY: ICD-10-CM

## 2021-02-16 PROCEDURE — 99282 EMERGENCY DEPT VISIT SF MDM: CPT

## 2021-02-16 RX ORDER — LIDOCAINE 50 MG/G
1 PATCH TOPICAL EVERY 24 HOURS
Qty: 10 PATCH | Refills: 0 | Status: ON HOLD | OUTPATIENT
Start: 2021-02-16 | End: 2021-03-15

## 2021-02-16 ASSESSMENT — FIBROSIS 4 INDEX: FIB4 SCORE: 0.53
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
## 2021-02-17 NOTE — DISCHARGE INSTRUCTIONS
Chronic pain can't be treated appropriately in the ER. IT's important to discuss this chronic pain with your primary care doctor and to consider a pain management referral. We've made a pain management referral for you as well. Our schedulers should reach out to you by phone.     In the meantime, try the lidocaine patches we've prescribed. Very similar patches are also available without a prescription at most pharmacies.

## 2021-02-17 NOTE — ED NOTES
Discharge orders received, instructions and education given, follow-up discussed, prescription x1 sent to pharmacy, pt verbalized understanding.

## 2021-02-17 NOTE — ED TRIAGE NOTES
"Chief Complaint   Patient presents with   • Shoulder Pain   • Back Pain     41 yo male ambulatory to triage for above complaint. Pt reports 8/10 sharp/aching back pain from C-L spine, also includes L shoulder pain. States pain is chronic but has been intensifying x 3D. Denies chest pain, denies numbness or tingling. AOx4, GCS 15.    Educated on triage process, encourage to inform staff of any changes.     /80   Pulse 87   Temp 36.1 °C (96.9 °F) (Temporal)   Resp 18   Ht 1.981 m (6' 6\")   Wt (!) 136 kg (299 lb 2.6 oz)   SpO2 96%   BMI 34.57 kg/m²   "

## 2021-02-22 ENCOUNTER — HOSPITAL ENCOUNTER (EMERGENCY)
Facility: MEDICAL CENTER | Age: 41
End: 2021-02-22
Attending: EMERGENCY MEDICINE
Payer: MEDICAID

## 2021-02-22 VITALS
HEART RATE: 74 BPM | HEIGHT: 78 IN | WEIGHT: 290 LBS | SYSTOLIC BLOOD PRESSURE: 124 MMHG | BODY MASS INDEX: 33.55 KG/M2 | TEMPERATURE: 98.4 F | RESPIRATION RATE: 18 BRPM | DIASTOLIC BLOOD PRESSURE: 66 MMHG | OXYGEN SATURATION: 97 %

## 2021-02-22 DIAGNOSIS — R73.9 HYPERGLYCEMIA: ICD-10-CM

## 2021-02-22 DIAGNOSIS — K62.5 RECTAL BLEEDING: ICD-10-CM

## 2021-02-22 DIAGNOSIS — K60.2 ANAL FISSURE: ICD-10-CM

## 2021-02-22 LAB
ALBUMIN SERPL BCP-MCNC: 4.1 G/DL (ref 3.2–4.9)
ALBUMIN/GLOB SERPL: 1.8 G/DL
ALP SERPL-CCNC: 58 U/L (ref 30–99)
ALT SERPL-CCNC: 11 U/L (ref 2–50)
ANION GAP SERPL CALC-SCNC: 14 MMOL/L (ref 7–16)
ANISOCYTOSIS BLD QL SMEAR: ABNORMAL
APPEARANCE UR: CLEAR
APTT PPP: 29.1 SEC (ref 24.7–36)
AST SERPL-CCNC: 11 U/L (ref 12–45)
B-OH-BUTYR SERPL-MCNC: <0.2 MMOL/L (ref 0.02–0.27)
BASE EXCESS BLDV CALC-SCNC: -2 MMOL/L
BASOPHILS # BLD AUTO: 1.6 % (ref 0–1.8)
BASOPHILS # BLD: 0.16 K/UL (ref 0–0.12)
BILIRUB SERPL-MCNC: 0.2 MG/DL (ref 0.1–1.5)
BILIRUB UR QL STRIP.AUTO: NEGATIVE
BODY TEMPERATURE: ABNORMAL CENTIGRADE
BUN SERPL-MCNC: 20 MG/DL (ref 8–22)
CALCIUM SERPL-MCNC: 8.9 MG/DL (ref 8.5–10.5)
CHLORIDE SERPL-SCNC: 93 MMOL/L (ref 96–112)
CO2 SERPL-SCNC: 20 MMOL/L (ref 20–33)
COLOR UR: YELLOW
CREAT SERPL-MCNC: 1.16 MG/DL (ref 0.5–1.4)
EOSINOPHIL # BLD AUTO: 0.08 K/UL (ref 0–0.51)
EOSINOPHIL NFR BLD: 0.8 % (ref 0–6.9)
ERYTHROCYTE [DISTWIDTH] IN BLOOD BY AUTOMATED COUNT: 44.4 FL (ref 35.9–50)
GLOBULIN SER CALC-MCNC: 2.3 G/DL (ref 1.9–3.5)
GLUCOSE BLD-MCNC: 318 MG/DL (ref 65–99)
GLUCOSE BLD-MCNC: 346 MG/DL (ref 65–99)
GLUCOSE SERPL-MCNC: 555 MG/DL (ref 65–99)
GLUCOSE UR STRIP.AUTO-MCNC: >=1000 MG/DL
HCO3 BLDV-SCNC: 23 MMOL/L (ref 24–28)
HCT VFR BLD AUTO: 37.6 % (ref 42–52)
HGB BLD-MCNC: 12.8 G/DL (ref 14–18)
INR PPP: 0.94 (ref 0.87–1.13)
KETONES UR STRIP.AUTO-MCNC: NEGATIVE MG/DL
LEUKOCYTE ESTERASE UR QL STRIP.AUTO: NEGATIVE
LYMPHOCYTES # BLD AUTO: 2.71 K/UL (ref 1–4.8)
LYMPHOCYTES NFR BLD: 26.6 % (ref 22–41)
MACROCYTES BLD QL SMEAR: ABNORMAL
MANUAL DIFF BLD: NORMAL
MCH RBC QN AUTO: 30.3 PG (ref 27–33)
MCHC RBC AUTO-ENTMCNC: 34 G/DL (ref 33.7–35.3)
MCV RBC AUTO: 88.9 FL (ref 81.4–97.8)
MICRO URNS: ABNORMAL
MICROCYTES BLD QL SMEAR: ABNORMAL
MONOCYTES # BLD AUTO: 0.33 K/UL (ref 0–0.85)
MONOCYTES NFR BLD AUTO: 3.2 % (ref 0–13.4)
MORPHOLOGY BLD-IMP: NORMAL
MYELOCYTES NFR BLD MANUAL: 1.6 %
NEUTROPHILS # BLD AUTO: 6.74 K/UL (ref 1.82–7.42)
NEUTROPHILS NFR BLD: 65.3 % (ref 44–72)
NEUTS BAND NFR BLD MANUAL: 0.8 % (ref 0–10)
NITRITE UR QL STRIP.AUTO: NEGATIVE
NRBC # BLD AUTO: 0 K/UL
NRBC BLD-RTO: 0 /100 WBC
OVALOCYTES BLD QL SMEAR: NORMAL
PCO2 BLDV: 37.4 MMHG (ref 41–51)
PH BLDV: 7.4 [PH] (ref 7.31–7.45)
PH UR STRIP.AUTO: 6 [PH] (ref 5–8)
PLATELET # BLD AUTO: 328 K/UL (ref 164–446)
PLATELET BLD QL SMEAR: NORMAL
PMV BLD AUTO: 10.4 FL (ref 9–12.9)
PO2 BLDV: 27.9 MMHG (ref 25–40)
POLYCHROMASIA BLD QL SMEAR: NORMAL
POTASSIUM SERPL-SCNC: 4.9 MMOL/L (ref 3.6–5.5)
PROT SERPL-MCNC: 6.4 G/DL (ref 6–8.2)
PROT UR QL STRIP: NEGATIVE MG/DL
PROTHROMBIN TIME: 12.9 SEC (ref 12–14.6)
RBC # BLD AUTO: 4.23 M/UL (ref 4.7–6.1)
RBC BLD AUTO: PRESENT
RBC UR QL AUTO: NEGATIVE
SAO2 % BLDV: 46.7 %
SODIUM SERPL-SCNC: 127 MMOL/L (ref 135–145)
SP GR UR STRIP.AUTO: 1.01
UROBILINOGEN UR STRIP.AUTO-MCNC: 0.2 MG/DL
WBC # BLD AUTO: 10.2 K/UL (ref 4.8–10.8)

## 2021-02-22 PROCEDURE — 82803 BLOOD GASES ANY COMBINATION: CPT

## 2021-02-22 PROCEDURE — 85610 PROTHROMBIN TIME: CPT

## 2021-02-22 PROCEDURE — 82010 KETONE BODYS QUAN: CPT

## 2021-02-22 PROCEDURE — 80053 COMPREHEN METABOLIC PANEL: CPT

## 2021-02-22 PROCEDURE — 81003 URINALYSIS AUTO W/O SCOPE: CPT

## 2021-02-22 PROCEDURE — 85007 BL SMEAR W/DIFF WBC COUNT: CPT

## 2021-02-22 PROCEDURE — 700102 HCHG RX REV CODE 250 W/ 637 OVERRIDE(OP): Performed by: EMERGENCY MEDICINE

## 2021-02-22 PROCEDURE — 700105 HCHG RX REV CODE 258: Performed by: EMERGENCY MEDICINE

## 2021-02-22 PROCEDURE — 36415 COLL VENOUS BLD VENIPUNCTURE: CPT

## 2021-02-22 PROCEDURE — 85027 COMPLETE CBC AUTOMATED: CPT

## 2021-02-22 PROCEDURE — 96374 THER/PROPH/DIAG INJ IV PUSH: CPT

## 2021-02-22 PROCEDURE — 99284 EMERGENCY DEPT VISIT MOD MDM: CPT

## 2021-02-22 PROCEDURE — 85730 THROMBOPLASTIN TIME PARTIAL: CPT

## 2021-02-22 PROCEDURE — 82962 GLUCOSE BLOOD TEST: CPT

## 2021-02-22 RX ORDER — POLYETHYLENE GLYCOL 3350 17 G/17G
17 POWDER, FOR SOLUTION ORAL DAILY
Qty: 225 G | Refills: 0 | Status: ON HOLD | OUTPATIENT
Start: 2021-02-22 | End: 2021-03-15

## 2021-02-22 RX ORDER — SODIUM CHLORIDE 9 MG/ML
2000 INJECTION, SOLUTION INTRAVENOUS ONCE
Status: COMPLETED | OUTPATIENT
Start: 2021-02-22 | End: 2021-02-22

## 2021-02-22 RX ORDER — SODIUM CHLORIDE 9 MG/ML
1000 INJECTION, SOLUTION INTRAVENOUS ONCE
Status: COMPLETED | OUTPATIENT
Start: 2021-02-22 | End: 2021-02-22

## 2021-02-22 RX ADMIN — INSULIN HUMAN 5 UNITS: 100 INJECTION, SOLUTION PARENTERAL at 20:13

## 2021-02-22 RX ADMIN — SODIUM CHLORIDE 1000 ML: 9 INJECTION, SOLUTION INTRAVENOUS at 21:20

## 2021-02-22 RX ADMIN — SODIUM CHLORIDE 2000 ML: 9 INJECTION, SOLUTION INTRAVENOUS at 18:26

## 2021-02-22 ASSESSMENT — FIBROSIS 4 INDEX: FIB4 SCORE: 0.53

## 2021-02-23 NOTE — ED NOTES
DC PAPERS AND SCRIPTS PROVIDED TO PT. ALL QUESTIONS ADDRESSED AT THIS TIME. IV REMOVED PRIOR TO DC. PT AMB OFF UNIT with steady gait..

## 2021-02-23 NOTE — ED NOTES
Blood sugar rechecked after insulin 318. Additional fluids started per mar. VSS. All needs addressed at this time. Call light within reach

## 2021-02-23 NOTE — DISCHARGE INSTRUCTIONS
Return if you have significant rectal bleeding. You need to take your metformin and your insulin. Drink more water. Take miralax as needed to have soft stools but not diarrhea. Stop taking hot chocolate as a laxative.  Return the emergency department if you have significant elevated blood sugars greater than 300, abdominal pain, vomiting, lightheadedness, dizziness or significant rectal bleeding.

## 2021-02-23 NOTE — ED NOTES
Pt medicated per mar.  Blood work drawn and sent to lab.  Pt states he is unable to provide urine sample at this time due to empty bladder.

## 2021-02-23 NOTE — ED TRIAGE NOTES
Chief Complaint   Patient presents with   • Hyperglycemia     565 per ems.  out of medications X 3 days   • Bloody Stools     states dark stools this morning.     PTA pt received 300 mL lactated ringers.  Connected to cardiac monitor, BP cuff, and continuous pulse ox upon arrival.  Pt in gown, call light in reach, bed in lowest position.  Chart up for ERP.

## 2021-02-23 NOTE — ED NOTES
Pt resting in room, all needs met at this time. UA collected and sent. 1000ml voided urine out. Call light within reach. Will continue to monitor

## 2021-02-23 NOTE — ED PROVIDER NOTES
ED Provider Note    Scribed for Torey Mcmanus M.D. by Peri Henning. 2/22/2021, 6:06 PM.    Primary care provider: ANIKET Davis  Means of arrival: EMS  History obtained from: patient  History limited by: none    CHIEF COMPLAINT  Chief Complaint   Patient presents with    Hyperglycemia     565 per ems.  out of medications X 3 days    Bloody Stools     states dark stools this morning.       HPI  Norm Prabhakar is a 40 y.o. male with a history of diabetes who presents to the Emergency Department via EMS for hyperglycemia and bloody stools onset earlier today. Patient reports 2 episodes of bloody stools today. He states that it appeared bright red at first and then appeared dark red later in the day. Patient reports associated nausea, mild abdominal pain and lower back pain. He has been treating his symptoms with hot chocolate and Gatorade. He denies any fever, vomiting, constipation, or diarrhea. He further denies any history of hemorrhoids or ulcers. He is not anticoagulated. He uses metformin and lantis to treat his diabetes. He was taking Trulicity but was taken off it last month secondary to causing abdominal pain. He denies any recent NSAID use or known exposure to COVID.     Per EMS, patient's blood sugar was 565 and he has not taken his medication for the past 3 days. Patient reports he has been stressed out due to not being able to see his fiancee and has not taken his medication. He denies any suicidal ideation or intentions of self-harm.     REVIEW OF SYSTEMS  Pertinent positives include hyperglycemia, bloody stools, nausea, abdominal pain, and lower back pain. Pertinent negatives include no fever, vomiting, constipation, diarrhea, suicidal ideation, or intentions of self-harm. All other systems negative.    PAST MEDICAL HISTORY   has a past medical history of Anxiety, ASTHMA, Bipolar 1 disorder (HCC), Depression, Diabetes (HCC), Fall, Glaucoma, Glaucoma (1982), Hypothyroidism,  Indigestion, Mental disorder, Murmur, Pneumonia, Psychiatric problem (), S/P thyroidectomy, Seizure (HCC) (2010), Seizure disorder (HCC), and Unspecified disorder of thyroid.    SURGICAL HISTORY   has a past surgical history that includes eye surgery; thyroid lobectomy; and other.    SOCIAL HISTORY  Social History     Tobacco Use    Smoking status: Current Every Day Smoker     Packs/day: 0.25     Types: Cigarettes, Cigars     Last attempt to quit: 2020     Years since quittin.7    Smokeless tobacco: Never Used    Tobacco comment: 3 cigarettes a day   Substance Use Topics    Alcohol use: Not Currently    Drug use: Yes     Types: Inhaled     Comment: marijuana      FAMILY HISTORY  Family History   Problem Relation Age of Onset    Hypertension Mother     Heart Disease Mother     Lung Disease Mother     Stroke Maternal Grandmother        CURRENT MEDICATIONS  Current Outpatient Medications:     lidocaine (LIDODERM) 5 % Patch, Place 1 Patch on the skin every 24 hours., Disp: 10 Patch, Rfl: 0    sertraline (ZOLOFT) 100 MG Tab, Take 100 mg by mouth every day., Disp: , Rfl:     levothyroxine (SYNTHROID) 200 MCG Tab, Take 200 mcg by mouth Every morning on an empty stomach., Disp: , Rfl:     Empagliflozin (JARDIANCE) 25 MG Tab, Take 25 mg by mouth every day., Disp: , Rfl:     topiramate (TOPAMAX) 25 MG Tab, Take 25 mg by mouth every day., Disp: , Rfl:     aspirin EC (ECOTRIN) 81 MG Tablet Delayed Response, Take 81 mg by mouth every morning., Disp: , Rfl:     busPIRone (BUSPAR) 10 MG Tab tablet, Take 10 mg by mouth 3 times a day., Disp: , Rfl:     metformin (GLUCOPHAGE) 1000 MG tablet, Take 1,000 mg by mouth 2 times a day, with meals., Disp: , Rfl:     divalproex (DEPAKOTE) 500 MG Tablet Delayed Response, Take 500-1,500 mg by mouth 2 (two) times a day. 500 mg AM 1500 mg PM, Disp: , Rfl:     budesonide-formoterol (SYMBICORT) 160-4.5 MCG/ACT Aerosol, Inhale 2 Puffs 2 Times a Day., Disp: , Rfl:     lurasidone (LATUDA)  20 MG Tab, Take 20 mg by mouth with dinner., Disp: , Rfl:     atorvastatin (LIPITOR) 80 MG tablet, Take 80 mg by mouth every evening., Disp: , Rfl:     lisinopril (PRINIVIL) 10 MG Tab, Take 10 mg by mouth every day., Disp: , Rfl:     Cholecalciferol (VITAMIN D3) 2000 UNIT Cap, Take 4,000 Units by mouth every bedtime., Disp: , Rfl:     prazosin (MINIPRESS) 2 MG Cap, Take 4 mg by mouth every bedtime., Disp: , Rfl:     montelukast (SINGULAIR) 10 MG Tab, Take 10 mg by mouth every bedtime., Disp: , Rfl:     ibuprofen (MOTRIN) 600 MG Tab, Take 600 mg by mouth 2 (two) times a day., Disp: , Rfl:     Omega-3 Fatty Acids (FISH OIL) 1000 MG Cap capsule, Take 1,000 mg by mouth 2 Times a Day., Disp: , Rfl:     Fenofibrate 134 MG Cap, Take 134 mg by mouth every evening., Disp: , Rfl:     insulin glargine (BASAGLAR KWIKPEN) 100 UNIT/ML injection PEN, Inject 23 Units under the skin 2 (two) times a day. Decrease to 20 units for BG less than 90, Disp: , Rfl:     acetaminophen (TYLENOL) 500 MG Tab, Take 1,000 mg by mouth 2 times a day as needed (headache)., Disp: , Rfl:     polyethylene glycol 3350 (MIRALAX) 17 GM/SCOOP Powder, Take 17 g by mouth every day. (Patient not taking: Reported on 2/3/2021), Disp: 850 g, Rfl: 0    ondansetron (ZOFRAN ODT) 4 MG TABLET DISPERSIBLE, Take 1 Tab by mouth every 6 hours as needed. (Patient not taking: Reported on 2/3/2021), Disp: 8 Tab, Rfl: 0    divalproex (DEPAKOTE) 500 MG Tablet Delayed Response, Take 2 Tabs by mouth every morning. (Patient not taking: Reported on 2/3/2021), Disp: 30 Tab, Rfl: 0    divalproex (DEPAKOTE) 500 MG Tablet Delayed Response, Take 3 Tabs by mouth every evening. (Patient not taking: Reported on 2/3/2021), Disp: 60 Tab, Rfl: 0    topiramate (TOPAMAX) 25 MG Tab, Take 1 Tab by mouth 2 times a day. (Patient not taking: Reported on 2/3/2021), Disp: 60 Tab, Rfl: 0     ALLERGIES  Allergies   Allergen Reactions    Geodon [Ziprasidone Hcl] Anaphylaxis     Anaphylaxis per patient  "   Abilify      \"Feeling tired, like I don't even know whats going on around me\"    Fish      Pt reports fish causes him to be sick to his stomach  Not listed on MAR noted 2/3/2021         PHYSICAL EXAM  VITAL SIGNS: /87   Pulse 75   Temp 36.9 °C (98.4 °F) (Temporal)   Resp 18   Ht 1.981 m (6' 6\")   Wt (!) 132 kg (290 lb)   SpO2 97%   BMI 33.51 kg/m²     Constitutional: Well developed, Well nourished, no acute distress.   HENT: Normocephalic, Atraumatic, mask in place.  Eyes: Conjunctiva normal, No discharge.   Cardiovascular: Normal heart rate, Normal rhythm, No murmurs, equal pulses.   Pulmonary: Normal breath sounds, No respiratory distress, No wheezing, No rales, No rhonchi.  Chest: No chest wall tenderness or deformity.   Abdomen: Obese, slight epigastric tenderness, Soft, No masses, no rebound, no guarding.   Back: No CVA tenderness.   : No external hemorrhoids visible, no internal hemorrhoids felt, tenderness at the 6 o'clock position, brown stool, guaiac negative.  Musculoskeletal: No major deformities noted, No tenderness.   Skin: Warm, Dry, No erythema, No rash.   Neurologic: Alert & oriented x 3, Normal motor function,  No focal deficits noted.   Psychiatric: Affect normal, Judgment normal, Mood normal.     LABS  Results for orders placed or performed during the hospital encounter of 02/22/21   CBC w/ Differential   Result Value Ref Range    WBC 10.2 4.8 - 10.8 K/uL    RBC 4.23 (L) 4.70 - 6.10 M/uL    Hemoglobin 12.8 (L) 14.0 - 18.0 g/dL    Hematocrit 37.6 (L) 42.0 - 52.0 %    MCV 88.9 81.4 - 97.8 fL    MCH 30.3 27.0 - 33.0 pg    MCHC 34.0 33.7 - 35.3 g/dL    RDW 44.4 35.9 - 50.0 fL    Platelet Count 328 164 - 446 K/uL    MPV 10.4 9.0 - 12.9 fL    Neutrophils-Polys 65.30 44.00 - 72.00 %    Lymphocytes 26.60 22.00 - 41.00 %    Monocytes 3.20 0.00 - 13.40 %    Eosinophils 0.80 0.00 - 6.90 %    Basophils 1.60 0.00 - 1.80 %    Nucleated RBC 0.00 /100 WBC    Neutrophils (Absolute) 6.74 1.82 - " 7.42 K/uL    Lymphs (Absolute) 2.71 1.00 - 4.80 K/uL    Monos (Absolute) 0.33 0.00 - 0.85 K/uL    Eos (Absolute) 0.08 0.00 - 0.51 K/uL    Baso (Absolute) 0.16 (H) 0.00 - 0.12 K/uL    NRBC (Absolute) 0.00 K/uL    Anisocytosis 1+     Macrocytosis 1+     Microcytosis 1+    Complete Metabolic Panel (CMP)   Result Value Ref Range    Sodium 127 (L) 135 - 145 mmol/L    Potassium 4.9 3.6 - 5.5 mmol/L    Chloride 93 (L) 96 - 112 mmol/L    Co2 20 20 - 33 mmol/L    Anion Gap 14.0 7.0 - 16.0    Glucose 555 (HH) 65 - 99 mg/dL    Bun 20 8 - 22 mg/dL    Creatinine 1.16 0.50 - 1.40 mg/dL    Calcium 8.9 8.5 - 10.5 mg/dL    AST(SGOT) 11 (L) 12 - 45 U/L    ALT(SGPT) 11 2 - 50 U/L    Alkaline Phosphatase 58 30 - 99 U/L    Total Bilirubin 0.2 0.1 - 1.5 mg/dL    Albumin 4.1 3.2 - 4.9 g/dL    Total Protein 6.4 6.0 - 8.2 g/dL    Globulin 2.3 1.9 - 3.5 g/dL    A-G Ratio 1.8 g/dL   APTT   Result Value Ref Range    APTT 29.1 24.7 - 36.0 sec   Prothrombin (PT/INR)   Result Value Ref Range    PT 12.9 12.0 - 14.6 sec    INR 0.94 0.87 - 1.13   VENOUS BLOOD GAS   Result Value Ref Range    Venous Bg Ph 7.40 7.31 - 7.45    Venous Bg Pco2 37.4 (L) 41.0 - 51.0 mmHg    Venous Bg Po2 27.9 25.0 - 40.0 mmHg    Venous Bg O2 Saturation 46.7 %    Venous Bg Hco3 23 (L) 24 - 28 mmol/L    Venous Bg Base Excess -2 mmol/L    Body Temp see below Centigrade   BETA-HYDROXYBUTYRIC ACID   Result Value Ref Range    beta-Hydroxybutyric Acid <0.20 0.02 - 0.27 mmol/L   URINALYSIS (UA)    Specimen: Urine   Result Value Ref Range    Color Yellow     Character Clear     Specific Gravity 1.010 <1.035    Ph 6.0 5.0 - 8.0    Glucose >=1000 (A) Negative mg/dL    Ketones Negative Negative mg/dL    Protein Negative Negative mg/dL    Bilirubin Negative Negative    Urobilinogen, Urine 0.2 Negative    Nitrite Negative Negative    Leukocyte Esterase Negative Negative    Occult Blood Negative Negative    Micro Urine Req see below    DIFFERENTIAL MANUAL   Result Value Ref Range     Bands-Stabs 0.80 0.00 - 10.00 %    Myelocytes 1.60 %    Manual Diff Status PERFORMED    PERIPHERAL SMEAR REVIEW   Result Value Ref Range    Peripheral Smear Review see below    PLATELET ESTIMATE   Result Value Ref Range    Plt Estimation Normal    MORPHOLOGY   Result Value Ref Range    RBC Morphology Present     Polychromia 1+     Ovalocytes 1+    ESTIMATED GFR   Result Value Ref Range    GFR If African American >60 >60 mL/min/1.73 m 2    GFR If Non African American >60 >60 mL/min/1.73 m 2   ACCU-CHEK GLUCOSE   Result Value Ref Range    Glucose - Accu-Ck 346 (H) 65 - 99 mg/dL   ACCU-CHEK GLUCOSE   Result Value Ref Range    Glucose - Accu-Ck 318 (H) 65 - 99 mg/dL      All labs reviewed by me.    COURSE & MEDICAL DECISION MAKING  Pertinent Labs & Imaging studies reviewed. (See chart for details)    Reviewed the patient's records which show he has a seizure disorder and was admitted earlier this month for right-sided weakness believed to be secondary to the seizures. He has been seen here mutliple times since the beginning of the year.     6:06 PM - Patient seen and examined at bedside. The patient will receive IV fluids for hyperglycemia. Ordered UA, B-hydroxybutyric acid, VBG, CBC with diff, CMP, APTT, prothrombin, differential manual, peripheral smear, platelet estimate, and morphology to evaluate his symptoms. The differential diagnoses include but are not limited to: DKA vs anal fissure vs diverticulitis vs diverticulosis vs hemorrhoids vs ulcer.      9:51 PM - Patient reevaluated at bed and updated on results of the studies. Patient given return precautions and advised to take his diabetes medications. Patient verbalizes understanding and agreement to this plan of care.      There was an improved response to IV fluids after patient reevaluation.    Medical Decision Making: At this point time I suspect that the patient had a anal fissure causing his rectal bleeding.  Patient's stool was brown and guaiac negative  here.  He is not hemodynamically unstable.  His hemoglobin is stable.  Patient did have significant hyperglycemia I think this is secondary to him not taking his Metformin as well as drinking hot chocolate to help with his constipation.  At this point time patient's blood sugars come down with IV fluids and insulin.  He is not in DKA.  His abdomen is otherwise soft nontender I do not think imaging is necessary.  Discussed using MiraLAX for constipation and not hot chocolate as it can cause his blood sugars to go up.  I will discharge the patient and have him follow-up with his primary.  He was given new prescription for his Metformin since he was not sure if he had any left.    The patient will return for new or worsening symptoms and is stable at the time of discharge.    The patient is referred to a primary physician for blood pressure management, diabetic screening, and for all other preventative health concerns.    DISPOSITION:  Patient will be discharged home in stable condition.    FOLLOW UP:  ANIKET Davis  [address] [facility] [id number] [phone]    Schedule an appointment as soon as possible for a visit in 3 days      FINAL IMPRESSION  1. Hyperglycemia    2. Rectal bleeding    3. Anal fissure          Peri ROB (Stephanie), am scribing for, and in the presence of, Torey Mcmanus M.D.    Electronically signed by: Peri Henning (Stephanie), 2/22/2021    Torey ROB M.D. personally performed the services described in this documentation, as scribed by Peri Henning in my presence, and it is both accurate and complete. C    The note accurately reflects work and decisions made by me.  Torey Mcmanus M.D.  2/23/2021  2:10 AM

## 2021-02-27 ENCOUNTER — HOSPITAL ENCOUNTER (EMERGENCY)
Facility: MEDICAL CENTER | Age: 41
End: 2021-02-27
Attending: EMERGENCY MEDICINE
Payer: MEDICAID

## 2021-02-27 ENCOUNTER — APPOINTMENT (OUTPATIENT)
Dept: RADIOLOGY | Facility: MEDICAL CENTER | Age: 41
End: 2021-02-27
Attending: EMERGENCY MEDICINE
Payer: MEDICAID

## 2021-02-27 VITALS
SYSTOLIC BLOOD PRESSURE: 123 MMHG | DIASTOLIC BLOOD PRESSURE: 83 MMHG | TEMPERATURE: 97.3 F | RESPIRATION RATE: 15 BRPM | HEART RATE: 85 BPM | WEIGHT: 290.13 LBS | BODY MASS INDEX: 33.57 KG/M2 | HEIGHT: 78 IN | OXYGEN SATURATION: 96 %

## 2021-02-27 DIAGNOSIS — S09.90XA CLOSED HEAD INJURY, INITIAL ENCOUNTER: ICD-10-CM

## 2021-02-27 DIAGNOSIS — S16.1XXA STRAIN OF NECK MUSCLE, INITIAL ENCOUNTER: ICD-10-CM

## 2021-02-27 PROCEDURE — 99283 EMERGENCY DEPT VISIT LOW MDM: CPT | Mod: 25

## 2021-02-27 PROCEDURE — 70450 CT HEAD/BRAIN W/O DYE: CPT

## 2021-02-27 PROCEDURE — 72125 CT NECK SPINE W/O DYE: CPT

## 2021-02-27 RX ORDER — CYCLOBENZAPRINE HCL 10 MG
10 TABLET ORAL 3 TIMES DAILY PRN
Qty: 15 TABLET | Refills: 0 | Status: ON HOLD | OUTPATIENT
Start: 2021-02-27 | End: 2021-03-15

## 2021-02-27 RX ORDER — IBUPROFEN 600 MG/1
600 TABLET ORAL EVERY 6 HOURS PRN
Qty: 60 TABLET | Refills: 0 | Status: ON HOLD | OUTPATIENT
Start: 2021-02-27 | End: 2021-03-15

## 2021-02-27 RX ORDER — ACETAMINOPHEN 500 MG
500-1000 TABLET ORAL EVERY 6 HOURS PRN
Qty: 60 TABLET | Refills: 0 | Status: SHIPPED | OUTPATIENT
Start: 2021-02-27 | End: 2021-07-01

## 2021-02-27 ASSESSMENT — FIBROSIS 4 INDEX: FIB4 SCORE: 0.38

## 2021-02-27 ASSESSMENT — LIFESTYLE VARIABLES: DO YOU DRINK ALCOHOL: YES

## 2021-02-27 NOTE — ED NOTES
Assist RN:    The patient has been provided with discharge education and information.  The patient was also provided with instructions on follow up care and return precautions.  The patient verbalizes understanding of discharge instructions, follow up care, and return precautions.  All questions have been answered.  Tylenol, motrin and flexeril RX prescribed by RYANNE.  NAD, A/Joesph, good color and appropriate at time of discharge.  Patient ambulated out of department.

## 2021-02-27 NOTE — ED TRIAGE NOTES
.  Chief Complaint   Patient presents with   • T-5000 GLF     fell backwards while playing basketball, hit back of head   • Headache     occipital    LOC?  Pt reports minor dizziness at this time.  No N/V.  No vision changes.  No neck or back pain.  Pt MAEW - CMS intact X 4.

## 2021-02-27 NOTE — ED TRIAGE NOTES
"Norm Prabhakar  Chief Complaint   Patient presents with   • T-5000 GLF     fell backwards while playing basketball, hit back of head   • Headache     occipital      Pt ambulatory to triage with above complaint.  VSS, no acute distress. Pt states that he \"blacked out\", +dizziness since.  A&O x4, GCS 15   Pt/staff masked and in appropriate PPE during encounter.   Pt returned to lobby, educated on triage process, and to inform staff of any changes or concerns.     "

## 2021-02-27 NOTE — ED PROVIDER NOTES
ED Provider Note    Scribed for Lulu Buitrago M.D. by Douglas Jo. 2/27/2021, 1:06 PM.    Primary care provider: ANIKET Davis  Means of arrival: Walk-in  History obtained from: Patient  History limited by: None    CHIEF COMPLAINT  Chief Complaint   Patient presents with   • T-5000 GLF     fell backwards while playing basketball, hit back of head   • Headache     occipital        HPI  Norm Prabhakar is a 40 y.o. male who presents to the Emergency Department for evaluation following a ground level fall onset prior to ED arrival. He was playing basketball today when he was bumped by another player and fell backwards, hitting his head. He states that he lost consciousness after the fall, but does not remember for how long. He was able to ambulate without help off the court and drove himself to the ED. The patient reports associated nausea, headache, blurred vision, posterior upper neck pain, tinging in his right arm, loss of consciousness, and fatigue. Negative for vomiting, fever, chills, back pain, arm weakness or abdominal pain. He has not taken any medicine to alleviate his pain. The patient has a history of anxiety, asthma, diabetes, glaucoma, and hypothyroidism.   Patient is not on anticoagulation.  Patient reports he has had a previous neck injury and prior episodes of tingling in his hand.  The fall seem to exacerbate this.  In triage he reported no neck pain but to me he was complaining of upper neck pain.    REVIEW OF SYSTEMS  Pertinent positives include ground level fall, nausea, headache, blurred vision, posterior upper neck pain, tinging in his right arm, loss of consciousness, and fatigue. Pertinent negatives include no vomiting, fever, chills, weakness in his arms, back pain, or abdominal pain. All other systems reviewed and negative.     PAST MEDICAL HISTORY   has a past medical history of Anxiety, ASTHMA, Bipolar 1 disorder (HCC), Depression, Diabetes (HCC), Fall, Glaucoma, Glaucoma  (), Hypothyroidism, Indigestion, Mental disorder, Murmur, Pneumonia, Psychiatric problem (), S/P thyroidectomy, Seizure (HCC) (), Seizure disorder (HCC), and Unspecified disorder of thyroid.    SURGICAL HISTORY   has a past surgical history that includes eye surgery; thyroid lobectomy; and other.    SOCIAL HISTORY  Social History     Tobacco Use   • Smoking status: Current Every Day Smoker     Packs/day: 0.25     Types: Cigarettes, Cigars     Last attempt to quit: 2020     Years since quittin.7   • Smokeless tobacco: Never Used   • Tobacco comment: 3 cigarettes a day   Substance Use Topics   • Alcohol use: Not Currently   • Drug use: Yes     Types: Inhaled     Comment: marijuana      Social History     Substance and Sexual Activity   Drug Use Yes   • Types: Inhaled    Comment: marijuana       FAMILY HISTORY  Family History   Problem Relation Age of Onset   • Hypertension Mother    • Heart Disease Mother    • Lung Disease Mother    • Stroke Maternal Grandmother        CURRENT MEDICATIONS  Home Medications     Reviewed by Khloe Gómez R.N. (Registered Nurse) on 21 at 1142  Med List Status: Partial   Medication Last Dose Status   acetaminophen (TYLENOL) 500 MG Tab  Active   aspirin EC (ECOTRIN) 81 MG Tablet Delayed Response  Active   atorvastatin (LIPITOR) 80 MG tablet  Active   budesonide-formoterol (SYMBICORT) 160-4.5 MCG/ACT Aerosol  Active   busPIRone (BUSPAR) 10 MG Tab tablet  Active   Cholecalciferol (VITAMIN D3) 2000 UNIT Cap  Active   divalproex (DEPAKOTE) 500 MG Tablet Delayed Response  Active   divalproex (DEPAKOTE) 500 MG Tablet Delayed Response  Active   divalproex (DEPAKOTE) 500 MG Tablet Delayed Response  Active   Empagliflozin (JARDIANCE) 25 MG Tab  Active   Fenofibrate 134 MG Cap  Active   ibuprofen (MOTRIN) 600 MG Tab  Active   insulin glargine (BASAGLAR KWIKPEN) 100 UNIT/ML injection PEN  Active   levothyroxine (SYNTHROID) 200 MCG Tab  Active   lidocaine (LIDODERM) 5  "% Patch  Active   lisinopril (PRINIVIL) 10 MG Tab  Active   lurasidone (LATUDA) 20 MG Tab  Active   metformin (GLUCOPHAGE) 1000 MG tablet  Active   montelukast (SINGULAIR) 10 MG Tab  Active   Omega-3 Fatty Acids (FISH OIL) 1000 MG Cap capsule  Active   ondansetron (ZOFRAN ODT) 4 MG TABLET DISPERSIBLE  Active   polyethylene glycol 3350 (MIRALAX) 17 GM/SCOOP Powder  Active   prazosin (MINIPRESS) 2 MG Cap  Active   sertraline (ZOLOFT) 100 MG Tab  Active   topiramate (TOPAMAX) 25 MG Tab  Active   topiramate (TOPAMAX) 25 MG Tab  Active                ALLERGIES  Allergies   Allergen Reactions   • Geodon [Ziprasidone Hcl] Anaphylaxis     Anaphylaxis per patient   • Abilify      \"Feeling tired, like I don't even know whats going on around me\"   • Fish      Pt reports fish causes him to be sick to his stomach  Not listed on MAR noted 2/3/2021         PHYSICAL EXAM  VITAL SIGNS: /83   Pulse 85   Temp 36.3 °C (97.3 °F) (Temporal)   Resp 15   Ht 1.981 m (6' 6\")   Wt (!) 132 kg (290 lb 2 oz)   SpO2 96%   BMI 33.53 kg/m²     Constitutional: Well developed, No acute distress, Non-toxic appearance. Working on his computer  HENT: Normocephalic, base of skull tender to palpation but no step off palpated , Bilateral external ears normal, No oral trauma Nose normal.   Eyes: PERRL, chronic eye changes in the right eye, EOMI, Conjunctiva normal.    Neck: Diffuse cervical tenderness to palpation and pain with range of motion, Supple.    Cardiovascular: Normal heart rate, Normal rhythm.    Thorax & Lungs: Normal breath sounds, No respiratory distress. No chest tenderness.  Abdomen: Benign abdominal exam, no tenderness, no distention, no guarding, no rebound.   Skin: Warm, Dry, No abrasions  Back: No lumbar or thoracic tenderness    Extremities: Intact distal pulses, No edema, No tenderness   Neurologic: Alert & oriented x 3, Normal motor function, Normal sensory function, No focal deficits noted. GCS 15  Psychiatric: " Appropriate                                                     DIAGNOSTIC STUDIES / PROCEDURES\    RADIOLOGY  CT-CSPINE WITHOUT PLUS RECONS   Final Result      1.  There is no acute fracture of the cervical spine.         CT-HEAD W/O   Final Result      No acute intracranial abnormality is identified.      There are extensive periventricular and subcortical white matter changes again present.      Left globe appears mildly enlarged.        The radiologist's interpretation of all radiological studies have been reviewed by me.    COURSE & MEDICAL DECISION MAKING  Nursing notes, VS, PMSFHx reviewed in chart.     Patient presents to the emergency department with head and neck pain after a fall.  Patient did lose consciousness.  Due to his persistent headache and nausea I do feel a CT is indicated to rule out intracranial bleed.  he is also having neck pain and a little bit of tingling in his right arm and therefore a CT of the neck will also be done.  Patient is easily able to use his right hand and has been typing on the computer without difficulty.  I do not appreciate any weakness.  The rest of his exam is unremarkable he is a GCS of 15.  No other x-rays are indicated at this time.  1:06 PM - Patient seen and examined at bedside. Ordered for CT CSpine and CT Head to evaluate. CT indicated due to moderate nausea and headache. I will await radiology results before determining whether interventions are necessary. The patient is agreeable and understanding of my plan of care.     1:13 PM - Cervical collar put in place.  Due to his complaint of neck pain.  He did not complain of neck pain in triage.    2:24 PM - Radiology shows no acute hemorrhage, fracture, or spinal injury.    Patient c-collar was removed but he states he feels better with a soft collar.  He has had previous neck injuries and states that he likes to wear soft collar.  His repeat neurologic exam is intact he has good  strength.  I believe he stable  for outpatient management.  He is advised to follow-up with his regular doctor concerning his head and neck pain.  I suspect he has a mild concussion.  Head injury precautions were given.    2:46 PM - Upon repeat evaluation, the patient is resting in bed with stable vitals. Discussed radiology results with him that show no acute abnormalities. I suspect his symptoms are due to a concussion. Encouraged plenty of rest and restraint from strenuous activity. I informed the patient of my plan for discharge, I provided precautions to return for any new or worsening symptoms. The patient verbalized understanding of discharge precautions and is agreeable to my plan.      The patient will return for new or worsening symptoms and is stable at the time of discharge.    The patient is referred to a primary physician for blood pressure management, diabetic screening, and for all other preventative health concerns.    DISPOSITION:  Patient will be discharged home in stable condition.    FOLLOW UP:  ANIKET Davis  [address] [place] [id number]  [phone]    In 3 days        OUTPATIENT MEDICATIONS:  Discharge Medication List as of 2/27/2021  3:28 PM      START taking these medications    Details   cyclobenzaprine (FLEXERIL) 10 mg Tab Take 1 tablet by mouth 3 times a day as needed for Muscle Spasms., Disp-15 tablet, R-0, Normal              FINAL IMPRESSION  1. Closed head injury, initial encounter    2. Strain of neck muscle, initial encounter          Douglas ROB (Stephanie), am scribing for, and in the presence of, Lulu Buitrago M.D..    Electronically signed by: Douglas Jo (Stephanie), 2/27/2021    Lulu ROB M.D. personally performed the services described in this documentation, as scribed by Douglas Jo in my presence, and it is both accurate and complete.    C.     The note accurately reflects work and decisions made by me.  Lulu Buitrago M.D.  2/27/2021  5:17 PM

## 2021-02-27 NOTE — DISCHARGE INSTRUCTIONS
Your CAT scans do not show evidence of a break in your neck or a bleed in your head.  You may have a mild concussion.  You also may have a mild sprain in your neck.  Continue to ice your neck.  You can take the muscle relaxant as needed.  Any fevers, worsening pain, new or different symptoms, worsening problems with your hand or any other concerns return.

## 2021-03-06 ENCOUNTER — APPOINTMENT (OUTPATIENT)
Dept: RADIOLOGY | Facility: MEDICAL CENTER | Age: 41
End: 2021-03-06
Attending: EMERGENCY MEDICINE
Payer: MEDICAID

## 2021-03-06 ENCOUNTER — HOSPITAL ENCOUNTER (EMERGENCY)
Facility: MEDICAL CENTER | Age: 41
End: 2021-03-06
Attending: EMERGENCY MEDICINE | Admitting: EMERGENCY MEDICINE
Payer: MEDICAID

## 2021-03-06 VITALS
RESPIRATION RATE: 16 BRPM | DIASTOLIC BLOOD PRESSURE: 79 MMHG | HEART RATE: 91 BPM | HEIGHT: 78 IN | OXYGEN SATURATION: 96 % | TEMPERATURE: 97.4 F | SYSTOLIC BLOOD PRESSURE: 138 MMHG | WEIGHT: 295.64 LBS | BODY MASS INDEX: 34.21 KG/M2

## 2021-03-06 DIAGNOSIS — M25.512 ACUTE PAIN OF LEFT SHOULDER: ICD-10-CM

## 2021-03-06 PROCEDURE — 700111 HCHG RX REV CODE 636 W/ 250 OVERRIDE (IP): Performed by: EMERGENCY MEDICINE

## 2021-03-06 PROCEDURE — 96372 THER/PROPH/DIAG INJ SC/IM: CPT

## 2021-03-06 PROCEDURE — 99284 EMERGENCY DEPT VISIT MOD MDM: CPT

## 2021-03-06 PROCEDURE — 73030 X-RAY EXAM OF SHOULDER: CPT | Mod: LT

## 2021-03-06 RX ORDER — KETOROLAC TROMETHAMINE 30 MG/ML
30 INJECTION, SOLUTION INTRAMUSCULAR; INTRAVENOUS ONCE
Status: COMPLETED | OUTPATIENT
Start: 2021-03-06 | End: 2021-03-06

## 2021-03-06 RX ORDER — NAPROXEN 500 MG/1
500 TABLET ORAL 2 TIMES DAILY WITH MEALS
Qty: 20 TABLET | Refills: 0 | Status: ON HOLD | OUTPATIENT
Start: 2021-03-06 | End: 2021-03-15

## 2021-03-06 RX ADMIN — KETOROLAC TROMETHAMINE 30 MG: 30 INJECTION, SOLUTION INTRAMUSCULAR; INTRAVENOUS at 16:07

## 2021-03-06 ASSESSMENT — PAIN DESCRIPTION - PAIN TYPE: TYPE: ACUTE PAIN

## 2021-03-06 ASSESSMENT — FIBROSIS 4 INDEX: FIB4 SCORE: 0.38

## 2021-03-06 NOTE — ED PROVIDER NOTES
ED Provider  Scribed for Augustus Carroll D.O. by Josue Cortez-Reyes. 3/6/2021  3:14 PM    Means of arrival:Walk-in  History obtained from:Patient  History limited by: None    CHIEF COMPLAINT  Chief Complaint   Patient presents with    Shoulder Pain     L shoulder. Today while playing basketball 'felt something pop and now I can't lift it very much because I'm in so much pain'. No hx of dislocation       HPI  Norm Prabhakar is a 40 y.o. male who presents tot the ED for evaluation of left shoulder pain. The patient states that the pain initially started after he was hit in the shoulder with a baseball at a [employer] game. He adds that he was playing basketball earlier today and that he felt his shoulder pop which prompted him to present to the ED. The patient's pain is exacerbated with motion. The patient does not have any previous history of shoulder pain. The patient has a history of diabetes which has had for about four years.    REVIEW OF SYSTEMS  See HPI for further details.     PAST MEDICAL HISTORY   has a past medical history of Anxiety, ASTHMA, Bipolar 1 disorder ([facility]), Depression, Diabetes ([facility]), Fall, Glaucoma, Glaucoma (), Hypothyroidism, Indigestion, Mental disorder, Murmur, Pneumonia, Psychiatric problem (), S/P thyroidectomy, Seizure ([facility]) (), Seizure disorder ([facility]), and Unspecified disorder of thyroid.    SOCIAL HISTORY  Social History     Tobacco Use    Smoking status: Current Every Day Smoker     Packs/day: 0.25     Types: Cigarettes, Cigars     Last attempt to quit: 2020     Years since quittin.7    Smokeless tobacco: Never Used    Tobacco comment: 3 cigarettes a day   Substance and Sexual Activity    Alcohol use: Not Currently    Drug use: Yes     Types: Inhaled     Comment: marijuana            SURGICAL HISTORY   has a past surgical history that includes eye surgery; thyroid lobectomy; and other.    CURRENT MEDICATIONS  Home Medications       Reviewed by Juan Nolan R.N.  "(Registered Nurse) on 03/06/21 at 1454  Med List Status: Partial     Medication Last Dose Status   acetaminophen (TYLENOL) 500 MG Tab  Active   aspirin EC (ECOTRIN) 81 MG Tablet Delayed Response  Active   atorvastatin (LIPITOR) 80 MG tablet  Active   budesonide-formoterol (SYMBICORT) 160-4.5 MCG/ACT Aerosol  Active   busPIRone (BUSPAR) 10 MG Tab tablet  Active   Cholecalciferol (VITAMIN D3) 2000 UNIT Cap  Active   cyclobenzaprine (FLEXERIL) 10 mg Tab  Active   divalproex (DEPAKOTE) 500 MG Tablet Delayed Response  Active   divalproex (DEPAKOTE) 500 MG Tablet Delayed Response  Active   divalproex (DEPAKOTE) 500 MG Tablet Delayed Response  Active   Empagliflozin (JARDIANCE) 25 MG Tab  Active   ibuprofen (MOTRIN) 600 MG Tab  Active   insulin glargine (BASAGLAR KWIKPEN) 100 UNIT/ML injection PEN  Active   levothyroxine (SYNTHROID) 200 MCG Tab  Active   lidocaine (LIDODERM) 5 % Patch  Active   lisinopril (PRINIVIL) 10 MG Tab  Active   lurasidone (LATUDA) 20 MG Tab  Active   metformin (GLUCOPHAGE) 1000 MG tablet  Active   montelukast (SINGULAIR) 10 MG Tab  Active   Omega-3 Fatty Acids (FISH OIL) 1000 MG Cap capsule  Active   ondansetron (ZOFRAN ODT) 4 MG TABLET DISPERSIBLE  Active   polyethylene glycol 3350 (MIRALAX) 17 GM/SCOOP Powder  Active   prazosin (MINIPRESS) 2 MG Cap  Active   sertraline (ZOLOFT) 100 MG Tab  Active   topiramate (TOPAMAX) 25 MG Tab  Active   topiramate (TOPAMAX) 25 MG Tab  Active                    ALLERGIES  Allergies   Allergen Reactions    Geodon [Ziprasidone Hcl] Anaphylaxis     Anaphylaxis per patient    Abilify      \"Feeling tired, like I don't even know whats going on around me\"    Fish      Pt reports fish causes him to be sick to his stomach  Not listed on MAR noted 2/3/2021         PHYSICAL EXAM  VITAL SIGNS: /82   Pulse (!) 105   Temp 36 °C (96.8 °F) (Temporal)   Resp 18   Ht 1.981 m (6' 6\")   Wt (!) 134 kg (295 lb 10.2 oz)   SpO2 99%   BMI 34.16 kg/m²   Constitutional: Alert " in no apparent distress. Patient is laying in bed suppine at about 20 degrees. He has his laptop on lap and is able to use both arms to grab and close laptop.  HENT: Normocephalic, Atraumatic, mucous membranes are moist.   Eyes: Conjunctiva normal, non-icteric.   Heart: No peripheral cyanosis   Lungs: Respirations are even and unlabored   Skin: Warm, Dry, normal color.   Neurologic: Alert, Grossly non-focal. No deformity on left shoulder or discoloration. Diffusedd soft tissue tenderness. Range of motion is limited secondary to pain her[employer] pain. Distal pulses and senses intact.  Psychiatric: Affect normal, Judgment normal, Mood normal, Appears appropriate and not intoxicated.     DIAGNOSTIC STUDIES / PROCEDURES      RADIOLOGY  DX-SHOULDER 2+ LEFT   Final Result      No acute osseous abnormality.      Tiny calcific density adjacent left humeral head could represent calcific tendinitis of the rotator cuff.        The radiologist's interpretations of all radiological studies have been reviewed by me.    Films have been independently by me      COURSE  Pertinent Labs & Imaging studies reviewed. (See chart for details)    3:14 PM - Patient seen and examined at bedside. Discussed plan of care, including plan to take X-rays of his left shoulder Patient agrees to the plan of care. The patient will be medicated with Toradol. Ordered for DX-shoulder to evaluate his symptoms. A sling was also ordered for the patient.    4:32 PM - I reevaluated the patient at bedside he was still able to use left hand to manipulate and move laptop. The patient informs me they feel improved following Toradol administration. I discussed the patient's diagnostic study results which show no acute osseous abdornmality. I discussed plan for discharge and follow up as outlined below. The patient verbalizes they feel comfortable going home. The patient is stable for discharge at this time and will return for any new or worsening symptoms. Patient  verbalizes understanding and support with my plan for discharge. The patient will be discharged home with a prescription for Naprosyn.    MEDICAL DECISION MAKING  This is a 40 y.o. male who presents with complaints of a left shoulder pain after making a basketball shot.  There is no deformity, there is no bony tenderness and his x-ray shows no dislocation and no fracture.    He has symptoms consistent with a muscle strain, could be tendinitis or even at tendon rupture however at the this time the plan will be to discharge home, symptomatic treatment and to follow-up with this pain does not resolve.  He has no numbness or tingling of the wrist and distal neurovascular is normal I do not suspect neurological, or vascular injury    The patient will return for new or worsening symptoms and is stable at the time of discharge.    DISPOSITION:  Patient will be discharged home in stable condition.    FOLLOW UP:  ANIKET Davis  [address] [place] [id number]  [phone]            OUTPATIENT MEDICATIONS:  New Prescriptions    NAPROXEN (NAPROSYN) 500 MG TAB    Take 1 tablet by mouth 2 times a day, with meals.         FINAL IMPRESSION  1. Acute pain of left shoulder         I, Josue Cortez-Reyes (Scribe), am scribing for, and in the presence of, Augustus Carroll D.O..    Electronically signed by: Josue Cortez-Reyes (Scribe), 3/6/2021    IAugustus D.O. personally performed the services described in this documentation, as scribed by Josue Cortez-Reyes in my presence, and it is both accurate and complete. E    The note accurately reflects work and decisions made by me.  Augustus Carroll D.O.  3/6/2021  6:26 PM

## 2021-03-06 NOTE — ED TRIAGE NOTES
"Chief Complaint   Patient presents with   • Shoulder Pain     L shoulder. Today while playing basketball 'felt something pop and now I can't lift it very much because I'm in so much pain'. No hx of dislocation     Pt ambulatory to triage for the above complaint. VSS on RA, NAD, GCS 15, sensation intact, + radial pulse LUE.     Denies all s/sx of covid, denies recent travel, denies fevers.    Pt returned to lobby. Educated on triage process and to inform staff of any changes.     /82   Pulse (!) 105   Temp 36 °C (96.8 °F) (Temporal)   Resp 18   Ht 1.981 m (6' 6\")   Wt (!) 134 kg (295 lb 10.2 oz)   SpO2 99%   BMI 34.16 kg/m²      "

## 2021-03-06 NOTE — ED NOTES
Pt a,bulated to yellow 62 with steady gait. Changed into gown and placed on monitors. ERP at bedside.

## 2021-03-07 NOTE — DISCHARGE INSTRUCTIONS
X-rays were normal, your symptoms suggest that you pulled a muscle, symptoms are could be a tendon tear.  This is treated as a sprain strain, with anti-inflammatory, ice, rest, and sling.  Please follow-up with your primary doctor

## 2021-03-07 NOTE — ED NOTES
Sling applied and discharge teaching provided with printing instructions given to pt. Pt verbalized understanding of teaching, and all pt's questions answered. Pt ambulated to lobby with steady gait.

## 2021-03-09 ENCOUNTER — APPOINTMENT (OUTPATIENT)
Dept: RADIOLOGY | Facility: MEDICAL CENTER | Age: 41
DRG: 880 | End: 2021-03-09
Attending: EMERGENCY MEDICINE
Payer: MEDICAID

## 2021-03-09 ENCOUNTER — HOSPITAL ENCOUNTER (INPATIENT)
Facility: MEDICAL CENTER | Age: 41
LOS: 5 days | DRG: 880 | End: 2021-03-15
Attending: EMERGENCY MEDICINE | Admitting: STUDENT IN AN ORGANIZED HEALTH CARE EDUCATION/TRAINING PROGRAM
Payer: MEDICAID

## 2021-03-09 DIAGNOSIS — G81.91 HEMIPARESIS OF RIGHT DOMINANT SIDE, UNSPECIFIED HEMIPARESIS ETIOLOGY (HCC): ICD-10-CM

## 2021-03-09 DIAGNOSIS — I63.9 ACUTE CVA (CEREBROVASCULAR ACCIDENT) (HCC): ICD-10-CM

## 2021-03-09 LAB
ABO GROUP BLD: NORMAL
ALBUMIN SERPL BCP-MCNC: 4.1 G/DL (ref 3.2–4.9)
ALBUMIN/GLOB SERPL: 1.8 G/DL
ALP SERPL-CCNC: 51 U/L (ref 30–99)
ALT SERPL-CCNC: 18 U/L (ref 2–50)
ANION GAP SERPL CALC-SCNC: 13 MMOL/L (ref 7–16)
ANISOCYTOSIS BLD QL SMEAR: ABNORMAL
APTT PPP: 26.5 SEC (ref 24.7–36)
AST SERPL-CCNC: 21 U/L (ref 12–45)
BASOPHILS # BLD AUTO: 1.8 % (ref 0–1.8)
BASOPHILS # BLD: 0.16 K/UL (ref 0–0.12)
BILIRUB SERPL-MCNC: 0.2 MG/DL (ref 0.1–1.5)
BLD GP AB SCN SERPL QL: NORMAL
BUN SERPL-MCNC: 25 MG/DL (ref 8–22)
CALCIUM SERPL-MCNC: 8.4 MG/DL (ref 8.5–10.5)
CHLORIDE SERPL-SCNC: 106 MMOL/L (ref 96–112)
CO2 SERPL-SCNC: 18 MMOL/L (ref 20–33)
CREAT SERPL-MCNC: 0.88 MG/DL (ref 0.5–1.4)
EKG IMPRESSION: NORMAL
EOSINOPHIL # BLD AUTO: 0 K/UL (ref 0–0.51)
EOSINOPHIL NFR BLD: 0 % (ref 0–6.9)
ERYTHROCYTE [DISTWIDTH] IN BLOOD BY AUTOMATED COUNT: 44.3 FL (ref 35.9–50)
EST. AVERAGE GLUCOSE BLD GHB EST-MCNC: 235 MG/DL
ETHANOL BLD-MCNC: <10.1 MG/DL (ref 0–10)
GLOBULIN SER CALC-MCNC: 2.3 G/DL (ref 1.9–3.5)
GLUCOSE BLD-MCNC: 170 MG/DL (ref 65–99)
GLUCOSE SERPL-MCNC: 209 MG/DL (ref 65–99)
HBA1C MFR BLD: 9.8 % (ref 4–5.6)
HCT VFR BLD AUTO: 36.4 % (ref 42–52)
HGB BLD-MCNC: 11.7 G/DL (ref 14–18)
INR PPP: 0.99 (ref 0.87–1.13)
LYMPHOCYTES # BLD AUTO: 2.24 K/UL (ref 1–4.8)
LYMPHOCYTES NFR BLD: 25.2 % (ref 22–41)
MANUAL DIFF BLD: NORMAL
MCH RBC QN AUTO: 28.1 PG (ref 27–33)
MCHC RBC AUTO-ENTMCNC: 32.1 G/DL (ref 33.7–35.3)
MCV RBC AUTO: 87.5 FL (ref 81.4–97.8)
MICROCYTES BLD QL SMEAR: ABNORMAL
MONOCYTES # BLD AUTO: 0.48 K/UL (ref 0–0.85)
MONOCYTES NFR BLD AUTO: 5.4 % (ref 0–13.4)
MORPHOLOGY BLD-IMP: NORMAL
MYELOCYTES NFR BLD MANUAL: 0.9 %
NEUTROPHILS # BLD AUTO: 5.94 K/UL (ref 1.82–7.42)
NEUTROPHILS NFR BLD: 64.9 % (ref 44–72)
NEUTS BAND NFR BLD MANUAL: 1.8 % (ref 0–10)
NRBC # BLD AUTO: 0 K/UL
NRBC BLD-RTO: 0 /100 WBC
PLATELET # BLD AUTO: 289 K/UL (ref 164–446)
PLATELET BLD QL SMEAR: NORMAL
PMV BLD AUTO: 10.6 FL (ref 9–12.9)
POLYCHROMASIA BLD QL SMEAR: NORMAL
POTASSIUM SERPL-SCNC: 3.6 MMOL/L (ref 3.6–5.5)
PROT SERPL-MCNC: 6.4 G/DL (ref 6–8.2)
PROTHROMBIN TIME: 13.3 SEC (ref 12–14.6)
RBC # BLD AUTO: 4.16 M/UL (ref 4.7–6.1)
RBC BLD AUTO: PRESENT
RH BLD: NORMAL
SODIUM SERPL-SCNC: 137 MMOL/L (ref 135–145)
TOXIC GRANULES BLD QL SMEAR: SLIGHT
TROPONIN T SERPL-MCNC: 23 NG/L (ref 6–19)
WBC # BLD AUTO: 8.9 K/UL (ref 4.8–10.8)

## 2021-03-09 PROCEDURE — 70498 CT ANGIOGRAPHY NECK: CPT

## 2021-03-09 PROCEDURE — 82962 GLUCOSE BLOOD TEST: CPT

## 2021-03-09 PROCEDURE — 700102 HCHG RX REV CODE 250 W/ 637 OVERRIDE(OP): Performed by: HOSPITALIST

## 2021-03-09 PROCEDURE — 85027 COMPLETE CBC AUTOMATED: CPT

## 2021-03-09 PROCEDURE — U0005 INFEC AGEN DETEC AMPLI PROBE: HCPCS

## 2021-03-09 PROCEDURE — 700117 HCHG RX CONTRAST REV CODE 255: Performed by: EMERGENCY MEDICINE

## 2021-03-09 PROCEDURE — 70450 CT HEAD/BRAIN W/O DYE: CPT

## 2021-03-09 PROCEDURE — 94760 N-INVAS EAR/PLS OXIMETRY 1: CPT

## 2021-03-09 PROCEDURE — 86901 BLOOD TYPING SEROLOGIC RH(D): CPT

## 2021-03-09 PROCEDURE — 86900 BLOOD TYPING SEROLOGIC ABO: CPT

## 2021-03-09 PROCEDURE — U0003 INFECTIOUS AGENT DETECTION BY NUCLEIC ACID (DNA OR RNA); SEVERE ACUTE RESPIRATORY SYNDROME CORONAVIRUS 2 (SARS-COV-2) (CORONAVIRUS DISEASE [COVID-19]), AMPLIFIED PROBE TECHNIQUE, MAKING USE OF HIGH THROUGHPUT TECHNOLOGIES AS DESCRIBED BY CMS-2020-01-R: HCPCS

## 2021-03-09 PROCEDURE — G0378 HOSPITAL OBSERVATION PER HR: HCPCS

## 2021-03-09 PROCEDURE — 71045 X-RAY EXAM CHEST 1 VIEW: CPT

## 2021-03-09 PROCEDURE — 99214 OFFICE O/P EST MOD 30 MIN: CPT | Performed by: PSYCHIATRY & NEUROLOGY

## 2021-03-09 PROCEDURE — 99285 EMERGENCY DEPT VISIT HI MDM: CPT

## 2021-03-09 PROCEDURE — 700105 HCHG RX REV CODE 258: Performed by: HOSPITALIST

## 2021-03-09 PROCEDURE — 86850 RBC ANTIBODY SCREEN: CPT

## 2021-03-09 PROCEDURE — 82077 ASSAY SPEC XCP UR&BREATH IA: CPT

## 2021-03-09 PROCEDURE — A9270 NON-COVERED ITEM OR SERVICE: HCPCS | Performed by: HOSPITALIST

## 2021-03-09 PROCEDURE — 70496 CT ANGIOGRAPHY HEAD: CPT

## 2021-03-09 PROCEDURE — 93005 ELECTROCARDIOGRAM TRACING: CPT | Performed by: EMERGENCY MEDICINE

## 2021-03-09 PROCEDURE — 80053 COMPREHEN METABOLIC PANEL: CPT

## 2021-03-09 PROCEDURE — 85610 PROTHROMBIN TIME: CPT

## 2021-03-09 PROCEDURE — 99220 PR INITIAL OBSERVATION CARE,LEVL III: CPT | Performed by: HOSPITALIST

## 2021-03-09 PROCEDURE — 0042T CT-CEREBRAL PERFUSION ANALYSIS: CPT

## 2021-03-09 PROCEDURE — 96372 THER/PROPH/DIAG INJ SC/IM: CPT

## 2021-03-09 PROCEDURE — 83036 HEMOGLOBIN GLYCOSYLATED A1C: CPT

## 2021-03-09 PROCEDURE — 84484 ASSAY OF TROPONIN QUANT: CPT

## 2021-03-09 PROCEDURE — 85007 BL SMEAR W/DIFF WBC COUNT: CPT

## 2021-03-09 PROCEDURE — 85730 THROMBOPLASTIN TIME PARTIAL: CPT

## 2021-03-09 RX ORDER — TOPIRAMATE 25 MG/1
25 TABLET ORAL 2 TIMES DAILY
Status: DISCONTINUED | OUTPATIENT
Start: 2021-03-09 | End: 2021-03-09

## 2021-03-09 RX ORDER — ALBUTEROL SULFATE 90 UG/1
2 AEROSOL, METERED RESPIRATORY (INHALATION) EVERY 6 HOURS PRN
COMMUNITY
End: 2021-07-01

## 2021-03-09 RX ORDER — BISACODYL 10 MG
10 SUPPOSITORY, RECTAL RECTAL
Status: DISCONTINUED | OUTPATIENT
Start: 2021-03-09 | End: 2021-03-15 | Stop reason: HOSPADM

## 2021-03-09 RX ORDER — ACETAMINOPHEN 325 MG/1
650 TABLET ORAL EVERY 6 HOURS PRN
Status: DISCONTINUED | OUTPATIENT
Start: 2021-03-09 | End: 2021-03-15 | Stop reason: HOSPADM

## 2021-03-09 RX ORDER — ASPIRIN 81 MG/1
324 TABLET, CHEWABLE ORAL DAILY
Status: DISCONTINUED | OUTPATIENT
Start: 2021-03-10 | End: 2021-03-15 | Stop reason: HOSPADM

## 2021-03-09 RX ORDER — ASPIRIN 325 MG
325 TABLET ORAL DAILY
Status: DISCONTINUED | OUTPATIENT
Start: 2021-03-10 | End: 2021-03-15 | Stop reason: HOSPADM

## 2021-03-09 RX ORDER — POLYETHYLENE GLYCOL 3350 17 G/17G
1 POWDER, FOR SOLUTION ORAL
Status: DISCONTINUED | OUTPATIENT
Start: 2021-03-09 | End: 2021-03-15 | Stop reason: HOSPADM

## 2021-03-09 RX ORDER — LEVOTHYROXINE SODIUM 0.2 MG/1
200 TABLET ORAL
Status: DISCONTINUED | OUTPATIENT
Start: 2021-03-10 | End: 2021-03-15 | Stop reason: HOSPADM

## 2021-03-09 RX ORDER — DEXTROSE MONOHYDRATE 25 G/50ML
50 INJECTION, SOLUTION INTRAVENOUS
Status: DISCONTINUED | OUTPATIENT
Start: 2021-03-09 | End: 2021-03-11

## 2021-03-09 RX ORDER — DOCUSATE SODIUM 100 MG/1
100 CAPSULE, LIQUID FILLED ORAL DAILY
Status: ON HOLD | COMMUNITY
End: 2021-03-15

## 2021-03-09 RX ORDER — SERTRALINE HYDROCHLORIDE 100 MG/1
100 TABLET, FILM COATED ORAL DAILY
Status: DISCONTINUED | OUTPATIENT
Start: 2021-03-10 | End: 2021-03-15 | Stop reason: HOSPADM

## 2021-03-09 RX ORDER — HYDROXYZINE HYDROCHLORIDE 25 MG/1
25 TABLET, FILM COATED ORAL 2 TIMES DAILY
Status: ON HOLD | COMMUNITY
End: 2021-03-15

## 2021-03-09 RX ORDER — CHLORAL HYDRATE 500 MG
1000 CAPSULE ORAL 2 TIMES DAILY
Status: DISCONTINUED | OUTPATIENT
Start: 2021-03-09 | End: 2021-03-15 | Stop reason: HOSPADM

## 2021-03-09 RX ORDER — LIDOCAINE 50 MG/G
1 PATCH TOPICAL EVERY 24 HOURS
Status: DISCONTINUED | OUTPATIENT
Start: 2021-03-10 | End: 2021-03-09

## 2021-03-09 RX ORDER — SODIUM CHLORIDE, SODIUM LACTATE, POTASSIUM CHLORIDE, CALCIUM CHLORIDE 600; 310; 30; 20 MG/100ML; MG/100ML; MG/100ML; MG/100ML
INJECTION, SOLUTION INTRAVENOUS CONTINUOUS
Status: DISCONTINUED | OUTPATIENT
Start: 2021-03-09 | End: 2021-03-12

## 2021-03-09 RX ORDER — DIVALPROEX SODIUM 500 MG/1
1500 TABLET, DELAYED RELEASE ORAL EVERY EVENING
Status: DISCONTINUED | OUTPATIENT
Start: 2021-03-09 | End: 2021-03-15 | Stop reason: HOSPADM

## 2021-03-09 RX ORDER — TRAZODONE HYDROCHLORIDE 100 MG/1
100 TABLET ORAL
Status: ON HOLD | COMMUNITY
End: 2021-03-15

## 2021-03-09 RX ORDER — TRAVOPROST OPHTHALMIC SOLUTION 0.04 MG/ML
1 SOLUTION OPHTHALMIC EVERY EVENING
Status: ON HOLD | COMMUNITY
End: 2021-03-15

## 2021-03-09 RX ORDER — ONDANSETRON 4 MG/1
4 TABLET, ORALLY DISINTEGRATING ORAL EVERY 4 HOURS PRN
Status: DISCONTINUED | OUTPATIENT
Start: 2021-03-09 | End: 2021-03-15 | Stop reason: HOSPADM

## 2021-03-09 RX ORDER — TOPIRAMATE 50 MG/1
50 TABLET, FILM COATED ORAL
Status: ON HOLD | COMMUNITY
End: 2021-03-15

## 2021-03-09 RX ORDER — INSULIN GLARGINE 100 [IU]/ML
23 INJECTION, SOLUTION SUBCUTANEOUS
Status: DISCONTINUED | OUTPATIENT
Start: 2021-03-09 | End: 2021-03-15 | Stop reason: HOSPADM

## 2021-03-09 RX ORDER — LISINOPRIL 10 MG/1
10 TABLET ORAL DAILY
Status: DISCONTINUED | OUTPATIENT
Start: 2021-03-10 | End: 2021-03-15 | Stop reason: HOSPADM

## 2021-03-09 RX ORDER — DIVALPROEX SODIUM 500 MG/1
500-1500 TABLET, DELAYED RELEASE ORAL
Status: DISCONTINUED | OUTPATIENT
Start: 2021-03-09 | End: 2021-03-09

## 2021-03-09 RX ORDER — BUDESONIDE AND FORMOTEROL FUMARATE DIHYDRATE 160; 4.5 UG/1; UG/1
2 AEROSOL RESPIRATORY (INHALATION) 2 TIMES DAILY
Status: DISCONTINUED | OUTPATIENT
Start: 2021-03-09 | End: 2021-03-15 | Stop reason: HOSPADM

## 2021-03-09 RX ORDER — LURASIDONE HYDROCHLORIDE 20 MG/1
20 TABLET, FILM COATED ORAL
Status: DISCONTINUED | OUTPATIENT
Start: 2021-03-10 | End: 2021-03-15 | Stop reason: HOSPADM

## 2021-03-09 RX ORDER — GLIMEPIRIDE 2 MG/1
1 TABLET ORAL 2 TIMES DAILY
COMMUNITY
End: 2021-03-09

## 2021-03-09 RX ORDER — PRAZOSIN HYDROCHLORIDE 2 MG/1
4 CAPSULE ORAL
Status: DISCONTINUED | OUTPATIENT
Start: 2021-03-09 | End: 2021-03-15 | Stop reason: HOSPADM

## 2021-03-09 RX ORDER — MONTELUKAST SODIUM 10 MG/1
10 TABLET ORAL
Status: DISCONTINUED | OUTPATIENT
Start: 2021-03-09 | End: 2021-03-15 | Stop reason: HOSPADM

## 2021-03-09 RX ORDER — PROMETHAZINE HYDROCHLORIDE 25 MG/1
12.5-25 TABLET ORAL EVERY 4 HOURS PRN
Status: DISCONTINUED | OUTPATIENT
Start: 2021-03-09 | End: 2021-03-15 | Stop reason: HOSPADM

## 2021-03-09 RX ORDER — ATORVASTATIN CALCIUM 80 MG/1
80 TABLET, FILM COATED ORAL EVERY EVENING
Status: DISCONTINUED | OUTPATIENT
Start: 2021-03-10 | End: 2021-03-15 | Stop reason: HOSPADM

## 2021-03-09 RX ORDER — DIVALPROEX SODIUM 500 MG/1
500 TABLET, DELAYED RELEASE ORAL EVERY MORNING
Status: DISCONTINUED | OUTPATIENT
Start: 2021-03-10 | End: 2021-03-15 | Stop reason: HOSPADM

## 2021-03-09 RX ORDER — PROMETHAZINE HYDROCHLORIDE 25 MG/1
12.5-25 SUPPOSITORY RECTAL EVERY 4 HOURS PRN
Status: DISCONTINUED | OUTPATIENT
Start: 2021-03-09 | End: 2021-03-15 | Stop reason: HOSPADM

## 2021-03-09 RX ORDER — AMOXICILLIN 250 MG
2 CAPSULE ORAL 2 TIMES DAILY
Status: DISCONTINUED | OUTPATIENT
Start: 2021-03-10 | End: 2021-03-15 | Stop reason: HOSPADM

## 2021-03-09 RX ORDER — FENOFIBRATE 134 MG/1
134 CAPSULE ORAL DAILY
Status: ON HOLD | COMMUNITY
End: 2021-03-15

## 2021-03-09 RX ORDER — PROCHLORPERAZINE EDISYLATE 5 MG/ML
5-10 INJECTION INTRAMUSCULAR; INTRAVENOUS EVERY 4 HOURS PRN
Status: DISCONTINUED | OUTPATIENT
Start: 2021-03-09 | End: 2021-03-15 | Stop reason: HOSPADM

## 2021-03-09 RX ORDER — LATANOPROST 50 UG/ML
1 SOLUTION/ DROPS OPHTHALMIC NIGHTLY
Status: ON HOLD | COMMUNITY
End: 2021-03-15

## 2021-03-09 RX ORDER — OMEPRAZOLE 20 MG/1
20 CAPSULE, DELAYED RELEASE ORAL DAILY
COMMUNITY
End: 2021-07-01

## 2021-03-09 RX ORDER — ASPIRIN 300 MG/1
300 SUPPOSITORY RECTAL DAILY
Status: DISCONTINUED | OUTPATIENT
Start: 2021-03-10 | End: 2021-03-15 | Stop reason: HOSPADM

## 2021-03-09 RX ORDER — ONDANSETRON 2 MG/ML
4 INJECTION INTRAMUSCULAR; INTRAVENOUS EVERY 4 HOURS PRN
Status: DISCONTINUED | OUTPATIENT
Start: 2021-03-09 | End: 2021-03-15 | Stop reason: HOSPADM

## 2021-03-09 RX ADMIN — IOHEXOL 40 ML: 350 INJECTION, SOLUTION INTRAVENOUS at 17:04

## 2021-03-09 RX ADMIN — SODIUM CHLORIDE, POTASSIUM CHLORIDE, SODIUM LACTATE AND CALCIUM CHLORIDE: 600; 310; 30; 20 INJECTION, SOLUTION INTRAVENOUS at 22:15

## 2021-03-09 RX ADMIN — INSULIN GLARGINE 23 UNITS: 100 INJECTION, SOLUTION SUBCUTANEOUS at 22:38

## 2021-03-09 RX ADMIN — IOHEXOL 80 ML: 350 INJECTION, SOLUTION INTRAVENOUS at 17:09

## 2021-03-09 ASSESSMENT — ENCOUNTER SYMPTOMS
NAUSEA: 0
DOUBLE VISION: 0
DIZZINESS: 0
DIARRHEA: 0
SPEECH CHANGE: 1
FOCAL WEAKNESS: 1
ABDOMINAL PAIN: 0
BLURRED VISION: 0
SENSORY CHANGE: 1
CHILLS: 0
LOSS OF CONSCIOUSNESS: 0
COUGH: 0
FEVER: 0
VOMITING: 0
SHORTNESS OF BREATH: 0
SORE THROAT: 0
HEADACHES: 1
PALPITATIONS: 0
BACK PAIN: 1

## 2021-03-09 ASSESSMENT — FIBROSIS 4 INDEX
FIB4 SCORE: 0.65
FIB4 SCORE: 0.65

## 2021-03-10 LAB
ANION GAP SERPL CALC-SCNC: 14 MMOL/L (ref 7–16)
BUN SERPL-MCNC: 24 MG/DL (ref 8–22)
CALCIUM SERPL-MCNC: 8.5 MG/DL (ref 8.5–10.5)
CHLORIDE SERPL-SCNC: 103 MMOL/L (ref 96–112)
CHOLEST SERPL-MCNC: 199 MG/DL (ref 100–199)
CO2 SERPL-SCNC: 20 MMOL/L (ref 20–33)
CREAT SERPL-MCNC: 0.92 MG/DL (ref 0.5–1.4)
GLUCOSE BLD-MCNC: 112 MG/DL (ref 65–99)
GLUCOSE BLD-MCNC: 128 MG/DL (ref 65–99)
GLUCOSE BLD-MCNC: 144 MG/DL (ref 65–99)
GLUCOSE BLD-MCNC: 167 MG/DL (ref 65–99)
GLUCOSE BLD-MCNC: 169 MG/DL (ref 65–99)
GLUCOSE SERPL-MCNC: 164 MG/DL (ref 65–99)
HDLC SERPL-MCNC: 29 MG/DL
LDLC SERPL CALC-MCNC: ABNORMAL MG/DL
POTASSIUM SERPL-SCNC: 3.8 MMOL/L (ref 3.6–5.5)
SARS-COV-2 RNA RESP QL NAA+PROBE: NOTDETECTED
SODIUM SERPL-SCNC: 137 MMOL/L (ref 135–145)
SPECIMEN SOURCE: NORMAL
TRIGL SERPL-MCNC: 636 MG/DL (ref 0–149)
VALPROATE SERPL-MCNC: <2.8 UG/ML (ref 50–100)

## 2021-03-10 PROCEDURE — 82962 GLUCOSE BLOOD TEST: CPT | Mod: 91

## 2021-03-10 PROCEDURE — 97165 OT EVAL LOW COMPLEX 30 MIN: CPT

## 2021-03-10 PROCEDURE — 80048 BASIC METABOLIC PNL TOTAL CA: CPT

## 2021-03-10 PROCEDURE — 36415 COLL VENOUS BLD VENIPUNCTURE: CPT

## 2021-03-10 PROCEDURE — 80061 LIPID PANEL: CPT

## 2021-03-10 PROCEDURE — 99233 SBSQ HOSP IP/OBS HIGH 50: CPT | Performed by: STUDENT IN AN ORGANIZED HEALTH CARE EDUCATION/TRAINING PROGRAM

## 2021-03-10 PROCEDURE — 97161 PT EVAL LOW COMPLEX 20 MIN: CPT

## 2021-03-10 PROCEDURE — 80164 ASSAY DIPROPYLACETIC ACD TOT: CPT

## 2021-03-10 PROCEDURE — 92610 EVALUATE SWALLOWING FUNCTION: CPT

## 2021-03-10 PROCEDURE — 700102 HCHG RX REV CODE 250 W/ 637 OVERRIDE(OP): Performed by: HOSPITALIST

## 2021-03-10 PROCEDURE — 96372 THER/PROPH/DIAG INJ SC/IM: CPT

## 2021-03-10 PROCEDURE — 700111 HCHG RX REV CODE 636 W/ 250 OVERRIDE (IP): Performed by: HOSPITALIST

## 2021-03-10 PROCEDURE — A9270 NON-COVERED ITEM OR SERVICE: HCPCS | Performed by: HOSPITALIST

## 2021-03-10 PROCEDURE — 700105 HCHG RX REV CODE 258: Performed by: HOSPITALIST

## 2021-03-10 PROCEDURE — 770020 HCHG ROOM/CARE - TELE (206)

## 2021-03-10 RX ADMIN — MONTELUKAST 10 MG: 10 TABLET, FILM COATED ORAL at 20:50

## 2021-03-10 RX ADMIN — INSULIN GLARGINE 23 UNITS: 100 INJECTION, SOLUTION SUBCUTANEOUS at 11:50

## 2021-03-10 RX ADMIN — INSULIN HUMAN 2 UNITS: 100 INJECTION, SOLUTION PARENTERAL at 06:18

## 2021-03-10 RX ADMIN — INSULIN GLARGINE 23 UNITS: 100 INJECTION, SOLUTION SUBCUTANEOUS at 21:42

## 2021-03-10 RX ADMIN — INSULIN HUMAN 2 UNITS: 100 INJECTION, SOLUTION PARENTERAL at 11:49

## 2021-03-10 RX ADMIN — METFORMIN HYDROCHLORIDE 1000 MG: 500 TABLET ORAL at 11:45

## 2021-03-10 RX ADMIN — BUDESONIDE AND FORMOTEROL FUMARATE DIHYDRATE 2 PUFF: 160; 4.5 AEROSOL RESPIRATORY (INHALATION) at 17:12

## 2021-03-10 RX ADMIN — DIVALPROEX SODIUM 1500 MG: 500 TABLET, DELAYED RELEASE ORAL at 17:13

## 2021-03-10 RX ADMIN — ASPIRIN 324 MG: 81 TABLET, CHEWABLE ORAL at 11:45

## 2021-03-10 RX ADMIN — OMEGA-3 FATTY ACIDS CAP 1000 MG 1000 MG: 1000 CAP at 17:12

## 2021-03-10 RX ADMIN — ATORVASTATIN CALCIUM 80 MG: 80 TABLET, FILM COATED ORAL at 17:13

## 2021-03-10 RX ADMIN — SODIUM CHLORIDE, POTASSIUM CHLORIDE, SODIUM LACTATE AND CALCIUM CHLORIDE: 600; 310; 30; 20 INJECTION, SOLUTION INTRAVENOUS at 08:27

## 2021-03-10 RX ADMIN — LURASIDONE HYDROCHLORIDE 20 MG: 20 TABLET, FILM COATED ORAL at 17:12

## 2021-03-10 RX ADMIN — ENOXAPARIN SODIUM 40 MG: 40 INJECTION SUBCUTANEOUS at 06:11

## 2021-03-10 RX ADMIN — METFORMIN HYDROCHLORIDE 1000 MG: 500 TABLET ORAL at 17:12

## 2021-03-10 ASSESSMENT — ENCOUNTER SYMPTOMS
PALPITATIONS: 0
HEADACHES: 1
COUGH: 0
SORE THROAT: 0
EYE REDNESS: 0
SHORTNESS OF BREATH: 0
VOMITING: 0
DEPRESSION: 0
SENSORY CHANGE: 1
WHEEZING: 0
SPUTUM PRODUCTION: 0
BACK PAIN: 1
ABDOMINAL PAIN: 0
CHILLS: 0
NERVOUS/ANXIOUS: 0
WEAKNESS: 0
SPEECH CHANGE: 1
NAUSEA: 0
FOCAL WEAKNESS: 1
DIZZINESS: 0
DIAPHORESIS: 0
MYALGIAS: 0
FEVER: 0
SINUS PAIN: 0
EYE DISCHARGE: 0
NECK PAIN: 0

## 2021-03-10 ASSESSMENT — COGNITIVE AND FUNCTIONAL STATUS - GENERAL
TURNING FROM BACK TO SIDE WHILE IN FLAT BAD: A LITTLE
MOVING FROM LYING ON BACK TO SITTING ON SIDE OF FLAT BED: A LITTLE
SUGGESTED CMS G CODE MODIFIER DAILY ACTIVITY: CK
WALKING IN HOSPITAL ROOM: A LITTLE
TOILETING: A LOT
DAILY ACTIVITIY SCORE: 15
EATING MEALS: A LITTLE
PERSONAL GROOMING: A LITTLE
HELP NEEDED FOR BATHING: A LOT
DRESSING REGULAR UPPER BODY CLOTHING: A LITTLE
MOVING TO AND FROM BED TO CHAIR: A LITTLE
SUGGESTED CMS G CODE MODIFIER MOBILITY: CK
MOBILITY SCORE: 18
CLIMB 3 TO 5 STEPS WITH RAILING: A LITTLE
DRESSING REGULAR LOWER BODY CLOTHING: A LOT
STANDING UP FROM CHAIR USING ARMS: A LITTLE

## 2021-03-10 ASSESSMENT — ACTIVITIES OF DAILY LIVING (ADL): TOILETING: INDEPENDENT

## 2021-03-10 ASSESSMENT — GAIT ASSESSMENTS: GAIT LEVEL OF ASSIST: UNABLE TO PARTICIPATE

## 2021-03-10 NOTE — ASSESSMENT & PLAN NOTE
"- Etiology is elusive.  Patient does have a history of migraines and seizures and certainly these could be atypical presentations of these processes.  MRI brain showed \"Diffuse supratentorial white matter T2 hyperintensity. There is also mild increased T2 signal intensity in the bilateral cerebellar hemisphere. This finding is unchanged since the previous MRI dated 12/1/2014. This is nonspecific finding and likely represent severe chronic white matter leukoencephalopathy.  Moderate cerebral and cerebellar volume loss.  There has been no significant interval change since the 2/6/2021.\"  - Additionally he could have some contribution of psychogenic etiology as his physical exam is somewhat inconsistent.  Appreciated Psychiatry recs.    - PT/OT recommends post acute placement.   - Aspirin and high-dose statin.   "

## 2021-03-10 NOTE — ED TRIAGE NOTES
Chief Complaint   Patient presents with   • Possible Stroke     BIB EMS for Rt sided weakness, difficulty speaking, and tingling x1hr.

## 2021-03-10 NOTE — ASSESSMENT & PLAN NOTE
Patient is maintained on Topamax and Depakote, we will continue his baseline doses.  Neurology has been consulted

## 2021-03-10 NOTE — ASSESSMENT & PLAN NOTE
Placed on O2 and RT protocols  Continue inhaled steroid and long-acting beta agonist  Continue montelukast

## 2021-03-10 NOTE — ASSESSMENT & PLAN NOTE
Patient is maintained on Metformin, empagliflozin, and insulin Lantus 23 units twice daily.  We will continue his baseline therapy regimen with sliding scale coverage beyond that.  His last A1c was 9.0 and that was 1 month ago.

## 2021-03-10 NOTE — THERAPY
Physical Therapy   Initial Evaluation     Patient Name: Norm Prabhakar  Age:  40 y.o., Sex:  male  Medical Record #: [id number]  Today's Date: 3/10/2021     Precautions: Fall Risk, Swallow Precautions ( See Comments)    Assessment  Patient is 40 y.o. male with a diagnosis of R sided hemiparesis (unknown etiology).  Patient has significant medical PMHx including history of conversion disorder.  Patient demonstrates better functional strength and ROM than when formally tested.  Additionally, patient reports fatigue, however does not demonstrate any buckling of right leg and is able to maintain midline in sitting.  Right leg does appear moderately weaker on right side, with diminished coordination.  Anticipate with continued acute care PT patient will be able to return to baseline, however currently would need post acute placement prior to going back to group home.  PT will follow.     Plan    Recommend Physical Therapy 3 times per week until therapy goals are met for the following treatments:  Bed Mobility, Community Re-integration, Gait Training, Neuro Re-Education / Balance, Self Care/Home Evaluation, Stair Training, Therapeutic Activities and Therapeutic Exercises    DC Equipment Recommendations: Front-Wheel Walker  Discharge Recommendations: Recommend post-acute placement for additional physical therapy services prior to discharge home(anticipate pt will progress in acute care)     Objective     03/10/21 0930   Precautions   Precautions Fall Risk;Swallow Precautions ( See Comments)   Comments hx of conversion disorder   Prior Living Situation   Prior Services Intermittent Physical Support for ADL Per Service   Housing / Facility Group Home   Equipment Owned None   Lives with - Patient's Self Care Capacity Unrelated Adult   Comments pt has roommate   Prior Level of Functional Mobility   Bed Mobility Independent   Transfer Status Independent   Ambulation Independent   Distance Ambulation (Feet) 150   Assistive  Devices Used None   Stairs Independent   Cognition    Cognition / Consciousness X   Speech/ Communication Delayed Responses   Level of Consciousness Alert   Initiation Impaired   Comments inconsistent   Active ROM Lower Body    Active ROM Lower Body  X   Comments limited AROM upon formal testing, can move functionally   Strength Lower Body   Lower Body Strength  X   Comments very weak MMT, however >3/5 LE strength tested functionally   Sensation Lower Body   Lower Extremity Sensation   X   Comments reports diminished sensation on right side   Vision   Vision Comments L eye blurry   Balance Assessment   Sitting Balance (Static) Fair +   Sitting Balance (Dynamic) Fair +   Standing Balance (Static) Poor +   Standing Balance (Dynamic) Poor   Weight Shift Sitting Fair   Weight Shift Standing Poor   Comments HHA   Gait Analysis   Gait Level Of Assist Unable to Participate   Bed Mobility    Supine to Sit Moderate Assist   Sit to Supine Moderate Assist   Scooting Minimal Assist   Functional Mobility   Sit to Stand Moderate Assist   Bed, Chair, Wheelchair Transfer Minimal Assist   How much difficulty does the patient currently have...   Turning over in bed (including adjusting bedclothes, sheets and blankets)? 3   Sitting down on and standing up from a chair with arms (e.g., wheelchair, bedside commode, etc.) 3   Moving from lying on back to sitting on the side of the bed? 3   How much help from another person does the patient currently need...   Moving to and from a bed to a chair (including a wheelchair)? 3   Need to walk in a hospital room? 3   Climbing 3-5 steps with a railing? 3   6 clicks Mobility Score 18   Short Term Goals    Short Term Goal # 1 Patient will be able to demonstrate bed mobility SPV in 6 visits in order to [facility] ind   Short Term Goal # 2 Patient will be able to perform transfers SPV in 6 visits in order to [facility] ind   Short Term Goal # 3 Patient will be able to perform amb x150' SPV in 6  visits in order to [facility] ind   Education Group   Education Provided Role of Physical Therapist   Role of Physical Therapist Patient Response Patient;Eager;Explanation;Verbal Demonstration   Problem List    Problems Impaired Bed Mobility;Impaired Transfers;Impaired Ambulation;Functional Strength Deficit;Impaired Balance;Impaired Coordination;Impaired Vision;Decreased Activity Tolerance;Motor Planning / Sequencing   Anticipated Discharge Equipment and Recommendations   DC Equipment Recommendations Front-Wheel Walker

## 2021-03-10 NOTE — ED NOTES
Med rec updated and complete. Allergies reviewed. Per ERIC from [facility].  No antibiotic use noted in February or March.      Home pharmacy [facility]

## 2021-03-10 NOTE — ASSESSMENT & PLAN NOTE
Patient is maintained on prazosin, lisinopril as an outpatient.  Continue to doubt this is an acute ischemic event; continue his home medications monitor and titrate as appropriate

## 2021-03-10 NOTE — H&P
"Hospital Medicine History & Physical Note    Date of Service  3/9/2021    Primary Care Physician  ANIKET Davis    Consultants  Neurology    Code Status  Full Code    Chief Complaint  Chief Complaint   Patient presents with   • Possible Stroke     BIB EMS for Rt sided weakness, difficulty speaking, and tingling x1hr.       History of Presenting Illness  40 y.o. male who presented 3/9/2021 with a complex previous medical history that includes diabetes, hypertension, seizure disorder, PTSD, depression, migraine, hypothyroidism, dyslipidemia, chronic pain.    This patient has in the past had problems with right-sided hemiparesis.  He reports episodes sometimes 1 to 2-year where he will start with sensation loss in the right arm and leg, and this will progressed to right-sided paralysis, difficulty with his speech.  There is no pattern to this that he is able been able to identify.  He has been evaluated extensively in the past, and has never been found to have any structural or anatomic pathology.  He has been variously diagnosed with conversion disorder, atypical migraine.    This a.m. the patient reports a headache.  He oftentimes gets migraines, this migraine was in his opinion no different than normal.  It was gradual onset, the pain in terms of character or distribution provocation palliation and associated symptoms resolved what he expects for his normal migraines.  He took an aspirin for it, and his symptoms went away.  Tonight, the patient presents with similar symptoms.  He reports earlier he was sitting working on his computer and watching TV when he started get sensation changes and \"pins-and-needles feelings\" in his right side.  This progressed to complete paralysis difficulty with his speech.  He was therefore brought to the emergency room.  Here he had stat imaging of his central nervous system, which has been negative for acute intracranial pathology including large vessel occlusion or evidence " "of acute stroke.  He has been evaluated by neurology who feel he is not a candidate for TPA.  He is being admitted for an MRI and therapy.    Review of Systems  Review of Systems   Constitutional: Negative for chills and fever.   HENT: Negative for nosebleeds and sore throat.    Eyes: Negative for blurred vision and double vision.        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    Cardiovascular: Negative for chest pain, palpitations and leg swelling.   Gastrointestinal: Negative for abdominal pain, diarrhea, nausea and vomiting.   Genitourinary: Negative for dysuria and urgency.   Musculoskeletal: Positive for back pain.   Skin: Negative for rash.   Neurological: Positive for sensory change, speech change, focal weakness and headaches. Negative for dizziness and loss of consciousness.       Past Medical History   has a past medical history of Anxiety, ASTHMA, Bipolar 1 disorder (Formerly [facility]), Depression, Diabetes (Formerly [facility]), Fall, Glaucoma, Glaucoma (1982), Hypothyroidism, Indigestion, Mental disorder, Murmur, Pneumonia, Psychiatric problem (2002), S/P thyroidectomy, Seizure (Formerly [facility]) (2010), Seizure disorder (Formerly [facility]), and Unspecified disorder of thyroid.    Surgical History   has a past surgical history that includes eye surgery; thyroid lobectomy; and other.     Family History  family history includes Heart Disease in his mother; Hypertension in his mother; Lung Disease in his mother; Stroke in his maternal grandmother.     Social History   reports that he has been smoking cigarettes and cigars. He has been smoking about 0.25 packs per day. He has never used smokeless tobacco. He reports previous alcohol use. He reports current drug use. Drug: Inhaled.    Allergies  Allergies   Allergen Reactions   • Geodon [Ziprasidone Hcl] Anaphylaxis     Anaphylaxis per patient   • Abilify      \"Feeling tired, like I don't even know whats going on around me\"   • Fish      " Pt reports fish causes him to be sick to his stomach  Not listed on MAR noted 2/3/2021         Medications  Prior to Admission Medications   Prescriptions Last Dose Informant Patient Reported? Taking?   Cholecalciferol (VITAMIN D3) 2000 UNIT Cap  MAR from Other Facility Yes No   Sig: Take 4,000 Units by mouth every bedtime.   Empagliflozin (JARDIANCE) 25 MG Tab  MAR from Other Facility Yes No   Sig: Take 25 mg by mouth every day.   Omega-3 Fatty Acids (FISH OIL) 1000 MG Cap capsule  MAR from Other Facility Yes No   Sig: Take 1,000 mg by mouth 2 Times a Day.   acetaminophen (TYLENOL) 500 MG Tab   No No   Sig: Take 1-2 Tablets by mouth every 6 hours as needed for Moderate Pain (headache).   aspirin EC (ECOTRIN) 81 MG Tablet Delayed Response  MAR from Other Facility Yes No   Sig: Take 81 mg by mouth every morning.   atorvastatin (LIPITOR) 80 MG tablet  MAR from Other Facility Yes No   Sig: Take 80 mg by mouth every evening.   budesonide-formoterol (SYMBICORT) 160-4.5 MCG/ACT Aerosol  MAR from Other Facility Yes No   Sig: Inhale 2 Puffs 2 Times a Day.   busPIRone (BUSPAR) 10 MG Tab tablet  MAR from Other Facility Yes No   Sig: Take 10 mg by mouth 3 times a day.   cyclobenzaprine (FLEXERIL) 10 mg Tab   No No   Sig: Take 1 tablet by mouth 3 times a day as needed for Muscle Spasms.   divalproex (DEPAKOTE) 500 MG Tablet Delayed Response  MAR from Other Facility No No   Sig: Take 2 Tabs by mouth every morning.   Patient not taking: Reported on 2/3/2021   divalproex (DEPAKOTE) 500 MG Tablet Delayed Response  MAR from Other Facility No No   Sig: Take 3 Tabs by mouth every evening.   Patient not taking: Reported on 2/3/2021   divalproex (DEPAKOTE) 500 MG Tablet Delayed Response  MAR from Other Facility Yes No   Sig: Take 500-1,500 mg by mouth 2 (two) times a day. 500 mg AM  1500 mg PM   ibuprofen (MOTRIN) 600 MG Tab   No No   Sig: Take 1 tablet by mouth every 6 hours as needed for Moderate Pain or Headache.   insulin glargine  (BASAGLAR KWIKPEN) 100 UNIT/ML injection PEN  MAR from Other Facility Yes No   Sig: Inject 23 Units under the skin 2 (two) times a day. Decrease to 20 units for BG less than 90   levothyroxine (SYNTHROID) 200 MCG Tab  MAR from Other Facility Yes No   Sig: Take 200 mcg by mouth Every morning on an empty stomach.   lidocaine (LIDODERM) 5 % Patch   No No   Sig: Place 1 Patch on the skin every 24 hours.   lisinopril (PRINIVIL) 10 MG Tab  MAR from Other Facility Yes No   Sig: Take 10 mg by mouth every day.   lurasidone (LATUDA) 20 MG Tab  MAR from Other Facility Yes No   Sig: Take 20 mg by mouth with dinner.   metformin (GLUCOPHAGE) 1000 MG tablet   No No   Sig: Take 1 tablet by mouth 2 times a day, with meals for 30 days.   montelukast (SINGULAIR) 10 MG Tab  MAR from Other Facility Yes No   Sig: Take 10 mg by mouth every bedtime.   naproxen (NAPROSYN) 500 MG Tab   No No   Sig: Take 1 tablet by mouth 2 times a day, with meals.   ondansetron (ZOFRAN ODT) 4 MG TABLET DISPERSIBLE  MAR from Other Facility No No   Sig: Take 1 Tab by mouth every 6 hours as needed.   Patient not taking: Reported on 2/3/2021   polyethylene glycol 3350 (MIRALAX) 17 GM/SCOOP Powder   No No   Sig: Take 17 g by mouth every day.   prazosin (MINIPRESS) 2 MG Cap  MAR from Other Facility Yes No   Sig: Take 4 mg by mouth every bedtime.   sertraline (ZOLOFT) 100 MG Tab  MAR from Other Facility Yes No   Sig: Take 100 mg by mouth every day.   topiramate (TOPAMAX) 25 MG Tab  MAR from Other Facility No No   Sig: Take 1 Tab by mouth 2 times a day.   Patient not taking: Reported on 2/3/2021   topiramate (TOPAMAX) 25 MG Tab  MAR from Other Facility Yes No   Sig: Take 25 mg by mouth every day.      Facility-Administered Medications: None       Physical Exam  Temp:  [37.2 °C (98.9 °F)] 37.2 °C (98.9 °F)  Pulse:  [77-83] 77  Resp:  [13-33] 33  BP: (121-139)/(73-81) 124/73  SpO2:  [94 %-97 %] 96 %    Physical Exam  Constitutional:       General: He is not in acute  distress.     Appearance: He is well-developed. He is obese. He is not diaphoretic.   HENT:      Head: Normocephalic and atraumatic.   Eyes:      Conjunctiva/sclera: Conjunctivae normal.   Neck:      Vascular: No JVD.   Cardiovascular:      Rate and Rhythm: Normal rate.      Heart sounds: No murmur. No gallop.    Pulmonary:      Effort: Pulmonary effort is normal. No respiratory distress.      Breath sounds: No stridor. No wheezing or rales.   Abdominal:      Palpations: Abdomen is soft.      Tenderness: There is no abdominal tenderness. There is no guarding or rebound.   Skin:     General: Skin is warm and dry.      Findings: No rash.   Neurological:      Mental Status: He is alert and oriented to person, place, and time.      Comments: Patient is alert and interactive.  His speech is somewhat dysarthric, there is occasional word finding difficulties however he is easily understood.  He does not appear to have any receptive deficits.  Cranial nerves II through XII are intact with exception of the left eye in which he is blind, this is baseline for the patient following his surgery that he had in his past.  Strength on the left side is 5/5, sensation is intact.  On the right side the patient has 0 to trace strength on specific testing however when he lifts his right arm up and test for tone, and then let go of his arm, he controls it and lets it come down to the bed slowly and in a controlled fashion.  This is not consistent with his previous exam.  It is a similar instance in his lower leg.   Psychiatric:         Thought Content: Thought content normal.         Laboratory:  Recent Labs     03/09/21  1651   WBC 8.9   RBC 4.16*   HEMOGLOBIN 11.7*   HEMATOCRIT 36.4*   MCV 87.5   MCH 28.1   MCHC 32.1*   RDW 44.3   PLATELETCT 289   MPV 10.6     Recent Labs     03/09/21  1651   SODIUM 137   POTASSIUM 3.6   CHLORIDE 106   CO2 18*   GLUCOSE 209*   BUN 25*   CREATININE 0.88   CALCIUM 8.4*     Recent Labs     03/09/21  1651    ALTSGPT 18   ASTSGOT 21   ALKPHOSPHAT 51   TBILIRUBIN 0.2   GLUCOSE 209*     Recent Labs     03/09/21  1651   APTT 26.5   INR 0.99     No results for input(s): NTPROBNP in the last 72 hours.      Recent Labs     03/09/21  1651   TROPONINT 23*       Imaging:  DX-CHEST-PORTABLE (1 VIEW)   Final Result      No acute cardiac or pulmonary abnormality is noted.      CT-CTA NECK WITH & W/O-POST PROCESSING   Final Result      1.  CT angiogram of the neck within normal limits.      2.  Hypervascular masses anterior to the thyroid and cricoid cartilage again noted.      CT-CTA HEAD WITH & W/O-POST PROCESS   Final Result      CT angiogram of the [place] within normal limits.      CT-CEREBRAL PERFUSION ANALYSIS   Final Result      1.  Cerebral blood flow less than 30% likely representing completed infarct = 0 mL.      2.  T Max more than 6 seconds likely representing combination of completed infarct and ischemia = 0 mL.      3.  Mismatched volume likely representing ischemic brain/penumbra = None      4.  Please note that the cerebral perfusion was performed on the limited brain tissue around the basal ganglia region. Infarct/ischemia outside the CT perfusion sections can be missed in this study.      CT-HEAD W/O   Final Result      1.  No acute intracranial process.      2.  Unchanged diffuse small vessel ischemic change.      MR-BRAIN-W/O    (Results Pending)         Assessment/Plan:  I anticipate this patient is appropriate for observation status at this time.    Right hemiparesis (HCC)- (present on admission)  Assessment & Plan  Etiology is elusive.  Patient does have a history of migraines and seizures and certainly these could be atypical presentations of these processes.  Additionally he could still have an ischemic event though no evidence has been seen on the CT.  We will check an MRI to rule this out.  Additionally he could have some contribution of psychogenic etiology as his physical exam is somewhat  inconsistent.  Admit for MRI  Neurology has been consulted  Maintain n.p.o. until speech can evaluate  PT OT and speech consultations  Aspirin and high-dose statin  We will defer further stroke work-up unless the MRI indicates an acute ischemic event.  Agree with the emergency room physician and neurology that this patient is not a TPA candidate.  I think this medication would carry a greater risk than benefit for him    Seizure disorder (HCC)- (present on admission)  Assessment & Plan  Patient is maintained on Topamax and Depakote, we will continue his baseline doses.  Neurology has been consulted    HTN (hypertension)- (present on admission)  Assessment & Plan  Patient is maintained on prazosin, lisinopril as an outpatient.  As I doubt significantly this is an acute ischemic event we will continue his home medications monitor and titrate as appropriate    HLD (hyperlipidemia)- (present on admission)  Assessment & Plan  Continue high-dose statin    Type 2 diabetes mellitus without complication, without long-term current use of insulin (HCC)- (present on admission)  Assessment & Plan  Patient is maintained on Metformin, empagliflozin, and insulin Lantus 23 units twice daily.  We will continue his baseline therapy regimen with sliding scale coverage beyond that.  His last A1c was 9.0 and that was 1 month ago.    PTSD (post-traumatic stress disorder)- (present on admission)  Assessment & Plan  Continue home psychiatric regimen    Asthma- (present on admission)  Assessment & Plan  Placed on O2 and RT protocols  Continue inhaled steroid and long-acting beta agonist  Continue montelukast      Acquired hypothyroidism- (present on admission)  Assessment & Plan  Continue replacement  Last TSH was 48.61-month ago, this will need to be checked in the next 2 to 4 weeks

## 2021-03-10 NOTE — ASSESSMENT & PLAN NOTE
Continue replacement  Last TSH was 48.61-month ago, this will need to be checked in the next 2 to 4 weeks

## 2021-03-10 NOTE — CARE PLAN
Problem: Communication  Goal: The ability to communicate needs accurately and effectively will improve  Outcome: PROGRESSING AS EXPECTED  Note: Risk for impaired communication due to stroke like symptoms. Patient has expressive aphasia with a stutter. With extra time, patient is able to communicate needs with staff and verbalize understanding of education provided.      Problem: Safety  Goal: Will remain free from injury  Outcome: PROGRESSING AS EXPECTED  Note: Safety precautions in place. Patient demonstrates appropriate use of call light. Continue purposeful hourly rounding.

## 2021-03-10 NOTE — CONSULTS
Neurology STROKE CODE H&P  Neurohospitalist Service, [facility]    Referring Physician: Augustus Carroll D.O.    STROKE CODE:   Chief Complaint   Patient presents with   • Possible Stroke     BIB EMS for Rt sided weakness, difficulty speaking, and tingling x1hr.       To obtain the most accurate data regarding the time called, and time patient seen, refer to the stroke run-sheet and chart.  For time of CT, refer to the radiology report. See A&P below for TPA Decision and door to needle time if and when applicable.    HPI: Norm Prabhakar is a 40 y.o. male with history of bipolar 1 disorder, anxiety, depression, DM type II, falls, glaucoma, hypothyroidism s/p thyroidectomy,and epilepsy managed with Depakote and topamax presenting to [facility] ER for right sided weakness, difficulty speaking, and tingling and consulted for possible stroke. Patient was in his normal state of health at his group home today when at 16:00 he noted acute onset of tingling to his right side. EMS was notified by staff and upon their arrival, the patient noted right sided weakness, speech difficulty, and persistent sensation changes. EMS reported no anticoagulation, initial /84 and . Neurology was consulted via Code Stroke Pre-alert called at 16:43 with patient arrival at approximately 17:00. Initial NIHSS was 7 for right arm and leg drift and decreased sensation to entire right hemibody. He reports subjective facial weakness, however appears function given little effort for activation bilaterally. He reported a migraine headache earlier today with improvement after taking Excedrin. Otherwise denies acute vision changes, language difficulty, memory loss, abnormal movements, seizure, loss of consciousness, or episodes of confusion.     Of note, the patient has been seen at [facility] on 2/3/2021 and multiple similar hospitalizations in the past for sudden onset right-sided weakness,  "paresthesia, and distractiable stuttering speech.  After extensive neurological work-up that was largely normal, it was felt at that time that his complaints were consistent with conversion disorder which she has been diagnosed with in the past.    Review of systems: In addition to what is detailed in the HPI above, (and scanned into the chart if and when applicable), all other systems reviewed and are negative.    Past Medical History:    has a past medical history of Anxiety, ASTHMA, Bipolar 1 disorder (HCC), Depression, Diabetes (HCC), Fall, Glaucoma, Glaucoma (1982), Hypothyroidism, Indigestion, Mental disorder, Murmur, Pneumonia, Psychiatric problem (2002), S/P thyroidectomy, Seizure (HCC) (2010), Seizure disorder ([facility]), and Unspecified disorder of thyroid.    FHx:  family history includes Heart Disease in his mother; Hypertension in his mother; Lung Disease in his mother; Stroke in his maternal grandmother.    SHx:   reports that he has been smoking cigarettes and cigars. He has been smoking about 0.25 packs per day. He has never used smokeless tobacco. He reports previous alcohol use. He reports current drug use. Drug: Inhaled.    Allergies:  Allergies   Allergen Reactions   • Geodon [Ziprasidone Hcl] Anaphylaxis     Anaphylaxis per patient   • Abilify      \"Feeling tired, like I don't even know whats going on around me\"   • Fish      Pt reports fish causes him to be sick to his stomach  Not listed on MAR noted 2/3/2021         Medications:    Current Facility-Administered Medications:   •  topiramate (TOPAMAX) tablet 25 mg, 25 mg, Oral, BID, Kam Colón D.O.  •  [START ON 3/10/2021] sertraline (Zoloft) tablet 100 mg, 100 mg, Oral, DAILY, JOB Duffy.O.  •  prazosin (MINIPRESS) capsule 4 mg, 4 mg, Oral, QHS, JOB Duffy.O.  •  fish oil capsule 1,000 mg, 1,000 mg, Oral, BID, JOB Duffy.O.  •  montelukast (SINGULAIR) tablet 10 mg, 10 mg, Oral, QHS, Kam" ROSALINE Colón  •  metFORMIN (GLUCOPHAGE) tablet 1,000 mg, 1,000 mg, Oral, BID WITH MEALS, Kam Cloón D.O.  •  [START ON 3/10/2021] lurasidone (LATUDA) tablet 20 mg, 20 mg, Oral, PM MEAL, Kam Colón D.O.  •  [START ON 3/10/2021] lisinopril (PRINIVIL) tablet 10 mg, 10 mg, Oral, DAILY, Kam Colón D.O.  •  [START ON 3/10/2021] lidocaine (LIDODERM) 5 % 1 Patch, 1 Patch, Transdermal, Q24HRS, Kam Colón D.O.  •  [START ON 3/10/2021] levothyroxine (SYNTHROID) tablet 200 mcg, 200 mcg, Oral, AM ES, Kam Colón D.O.  •  insulin glargine (Lantus) injection PEN, 23 Units, Subcutaneous, BID, Kam Colón D.O.  •  [START ON 3/10/2021] Empagliflozin TABS 25 mg, 25 mg, Oral, DAILY, Kam Colón D.O.  •  divalproex (DEPAKOTE) delayed-release tablet 500-1,500 mg, 500-1,500 mg, Oral, BID, Kam Colón D.O.  •  budesonide-formoterol (SYMBICORT) 160-4.5 MCG/ACT inhaler 2 Puff, 2 Puff, Inhalation, BID, Kam Colón D.O.  •  atorvastatin (LIPITOR) tablet 80 mg, 80 mg, Oral, Q EVENING, Kam Colón D.O.  •  Pharmacy consult request - Allow for permissive hypertension: SBP up to 220 mmHg/DBP up to 120 mmHg x 48 hours, , Other, PHARMACY TO DOSE, Kam Colón D.O.  •  senna-docusate (PERICOLACE or SENOKOT S) 8.6-50 MG per tablet 2 tablet, 2 tablet, Oral, BID **AND** polyethylene glycol/lytes (MIRALAX) PACKET 1 Packet, 1 Packet, Oral, QDAY PRN **AND** magnesium hydroxide (MILK OF MAGNESIA) suspension 30 mL, 30 mL, Oral, QDAY PRN **AND** bisacodyl (DULCOLAX) suppository 10 mg, 10 mg, Rectal, QDAY PRN, Kam Colón D.O.  •  Respiratory Therapy Consult, , Nebulization, Continuous RT, Kam Colón D.O.  •  lactated ringers infusion, , Intravenous, Continuous, Kam Colón D.O.  •  [START ON 3/10/2021] enoxaparin (LOVENOX) inj 40 mg, 40 mg, Subcutaneous, DAILY, Kam  BAM Colón.  •  acetaminophen (Tylenol) tablet 650 mg, 650 mg, Oral, Q6HRS PRN, Kam Colón D.O.  •  Notify provider if pain remains uncontrolled, , , CONTINUOUS **AND** Use the numeric rating scale (NRS-11) on regular floors and Critical-Care Pain Observation Tool (CPOT) on ICUs/Trauma to assess pain, , , CONTINUOUS **AND** Pulse Ox, , , CONTINUOUS **AND** Pharmacy Consult Request ...Pain Management Review 1 Each, 1 Each, Other, PHARMACY TO DOSE **AND** If patient difficult to arouse and/or has respiratory depression (respiratory rate of 10 or less), stop any opiates that are currently infusing and call a Rapid Response., , , CONTINUOUS, Kam Colón D.O.  •  ondansetron (ZOFRAN) syringe/vial injection 4 mg, 4 mg, Intravenous, Q4HRS PRN, ENMANUEL DuffyO.  •  ondansetron (ZOFRAN ODT) dispertab 4 mg, 4 mg, Oral, Q4HRS PRN, JOB Duffy.O.  •  promethazine (PHENERGAN) tablet 12.5-25 mg, 12.5-25 mg, Oral, Q4HRS PRN, JOB Duffy.O.  •  promethazine (PHENERGAN) suppository 12.5-25 mg, 12.5-25 mg, Rectal, Q4HRS PRN, JOB Duffy.O.  •  prochlorperazine (COMPAZINE) injection 5-10 mg, 5-10 mg, Intravenous, Q4HRS PRN, JOB Duffy.O.  •  insulin regular (HumuLIN R,NovoLIN R) injection, 2-9 Units, Subcutaneous, Q6HRS **AND** POC Blood Glucose, , , Q6H **AND** NOTIFY MD and PharmD, , , Once **AND** glucose 4 g chewable tablet 16 g, 16 g, Oral, Q15 MIN PRN **AND** dextrose 50% (D50W) injection 50 mL, 50 mL, Intravenous, Q15 MIN PRN, Kam Colón D.O.  •  [START ON 3/10/2021] aspirin (ASA) tablet 325 mg, 325 mg, Oral, DAILY **OR** [START ON 3/10/2021] aspirin (ASA) chewable tab 324 mg, 324 mg, Oral, DAILY **OR** [START ON 3/10/2021] aspirin (ASA) suppository 300 mg, 300 mg, Rectal, DAILY, Kam Colón D.O.    Current Outpatient Medications:   •  naproxen (NAPROSYN) 500 MG Tab, Take 1 tablet by mouth 2 times a  day, with meals., Disp: 20 tablet, Rfl: 0  •  acetaminophen (TYLENOL) 500 MG Tab, Take 1-2 Tablets by mouth every 6 hours as needed for Moderate Pain (headache)., Disp: 60 tablet, Rfl: 0  •  ibuprofen (MOTRIN) 600 MG Tab, Take 1 tablet by mouth every 6 hours as needed for Moderate Pain or Headache., Disp: 60 tablet, Rfl: 0  •  cyclobenzaprine (FLEXERIL) 10 mg Tab, Take 1 tablet by mouth 3 times a day as needed for Muscle Spasms., Disp: 15 tablet, Rfl: 0  •  metformin (GLUCOPHAGE) 1000 MG tablet, Take 1 tablet by mouth 2 times a day, with meals for 30 days., Disp: 60 tablet, Rfl: 0  •  polyethylene glycol 3350 (MIRALAX) 17 GM/SCOOP Powder, Take 17 g by mouth every day., Disp: 225 g, Rfl: 0  •  lidocaine (LIDODERM) 5 % Patch, Place 1 Patch on the skin every 24 hours., Disp: 10 Patch, Rfl: 0  •  sertraline (ZOLOFT) 100 MG Tab, Take 100 mg by mouth every day., Disp: , Rfl:   •  levothyroxine (SYNTHROID) 200 MCG Tab, Take 200 mcg by mouth Every morning on an empty stomach., Disp: , Rfl:   •  Empagliflozin (JARDIANCE) 25 MG Tab, Take 25 mg by mouth every day., Disp: , Rfl:   •  topiramate (TOPAMAX) 25 MG Tab, Take 25 mg by mouth every day., Disp: , Rfl:   •  aspirin EC (ECOTRIN) 81 MG Tablet Delayed Response, Take 81 mg by mouth every morning., Disp: , Rfl:   •  busPIRone (BUSPAR) 10 MG Tab tablet, Take 10 mg by mouth 3 times a day., Disp: , Rfl:   •  divalproex (DEPAKOTE) 500 MG Tablet Delayed Response, Take 500-1,500 mg by mouth 2 (two) times a day. 500 mg AM 1500 mg PM, Disp: , Rfl:   •  budesonide-formoterol (SYMBICORT) 160-4.5 MCG/ACT Aerosol, Inhale 2 Puffs 2 Times a Day., Disp: , Rfl:   •  lurasidone (LATUDA) 20 MG Tab, Take 20 mg by mouth with dinner., Disp: , Rfl:   •  atorvastatin (LIPITOR) 80 MG tablet, Take 80 mg by mouth every evening., Disp: , Rfl:   •  lisinopril (PRINIVIL) 10 MG Tab, Take 10 mg by mouth every day., Disp: , Rfl:   •  Cholecalciferol (VITAMIN D3) 2000 UNIT Cap, Take 4,000 Units by mouth  every bedtime., Disp: , Rfl:   •  prazosin (MINIPRESS) 2 MG Cap, Take 4 mg by mouth every bedtime., Disp: , Rfl:   •  montelukast (SINGULAIR) 10 MG Tab, Take 10 mg by mouth every bedtime., Disp: , Rfl:   •  Omega-3 Fatty Acids (FISH OIL) 1000 MG Cap capsule, Take 1,000 mg by mouth 2 Times a Day., Disp: , Rfl:   •  insulin glargine (BASAGLAR KWIKPEN) 100 UNIT/ML injection PEN, Inject 23 Units under the skin 2 (two) times a day. Decrease to 20 units for BG less than 90, Disp: , Rfl:   •  ondansetron (ZOFRAN ODT) 4 MG TABLET DISPERSIBLE, Take 1 Tab by mouth every 6 hours as needed. (Patient not taking: Reported on 2/3/2021), Disp: 8 Tab, Rfl: 0  •  divalproex (DEPAKOTE) 500 MG Tablet Delayed Response, Take 2 Tabs by mouth every morning. (Patient not taking: Reported on 2/3/2021), Disp: 30 Tab, Rfl: 0  •  divalproex (DEPAKOTE) 500 MG Tablet Delayed Response, Take 3 Tabs by mouth every evening. (Patient not taking: Reported on 2/3/2021), Disp: 60 Tab, Rfl: 0  •  topiramate (TOPAMAX) 25 MG Tab, Take 1 Tab by mouth 2 times a day. (Patient not taking: Reported on 2/3/2021), Disp: 60 Tab, Rfl: 0    Physical Examination:    Vitals:    03/09/21 1720 03/09/21 1806 03/09/21 1830 03/09/21 1832   BP: 131/76 121/81 124/73    Pulse: 80 83 77    Resp: 13 20 (!) 33    Temp:       TempSrc:       SpO2: 97% 96% 96%    Weight:    (!) 130 kg (286 lb 9.6 oz)       General: Patient is awake and in no acute distress  Eyes: Examination of optic disks not indicated at this time  CV: RRR    NEUROLOGICAL EXAM:     Mental status: Awake, alert, fully oriented, and follows commands.  Speech and language: Speech is clear and fluent with stuttering impediment. The patient is able to name and repeat.  Cranial nerve exam: Pupils are equal, round and reactive to light bilaterally. Visual fields are intact to right by confrontation, absent to left with baseline blindness. Extraocular muscles are intact. Sensation in the face is slightly decreased to upper  middle and lower face to light touch. Face is symmetric with little effort for activation. Hearing to finger rub equal. Palate elevates symmetrically. Shoulder shrug is full. Tongue is midline.  Motor exam: Strength is 5/5 in LUE and LLE. Functional weakness to RUE with drift, but minimal effort given and protection of face with dropping limb. No movement or effort to RLE with positive Romero's sign. Tone is normal. No abnormal movements were seen on exam.  Sensory exam: Decreased sensation to entire right hemibody to light touch.   Deep tendon reflexes:  2+ and symmetric. Toes down-going bilaterally.  Coordination: No ataxia   Gait: deferred as patient is actively transported to CT scanner.     NIH Stroke Scale:    1a. Level of Consciousness (Alert, drowsy, etc): 0= Alert    1b. LOC Questions (Month, age): 0= Answers both correctly    1c. LOC Commands (Open/close eyes make fist/let go): 0= Obeys both correctly    2.   Best Gaze (Eyes open - patient follows examiner's finger on face): 0= Normal    3.   Visual Fields (introduce visual stimulus/threat to patient's field quadrants): 0= No visual loss  4.   Facial Paresis (Show teeth, raise eyebrows and squeeze eyes shut): 0= Normal     5a. Motor Arm - Left (Elevate arm to 90 degrees if patient is sitting, 45 degrees if  supine): 0= No drift    5b. Motor Arm - Right (Elevate arm to 90 degrees if patient is sitting, 45 degrees if supine): 2= Can't resist gravity    6a. Motor Leg - Left (Elevate leg 30 degrees with patient supine): 0= No drift    6b. Motor Leg - Right  (Elevate leg 30 degrees with patient supine): 4= No movement    7.   Limb Ataxia (Finger-nose, heel down shin): 0= No ataxia    8.   Sensory (Pin prick to face, arm, trunk and leg - compare side to side): 1= Partial loss    9.  Best Language (Name item, describe a picture and read sentences): 0= No aphasia    10. Dysarthria (Evaluate speech clarity by patient repeating listed words): 0= Normal  articulation    11. Extinction and Inattention (Use information from prior testing to identify neglect or  double simultaneous stimuli testing): 0= No neglect    Total NIH Score: 7    Objective Data:    Labs:  Lab Results   Component Value Date/Time    PROTHROMBTM 13.3 03/09/2021 04:51 PM    INR 0.99 03/09/2021 04:51 PM      Lab Results   Component Value Date/Time    WBC 8.9 03/09/2021 04:51 PM    RBC 4.16 (L) 03/09/2021 04:51 PM    HEMOGLOBIN 11.7 (L) 03/09/2021 04:51 PM    HEMATOCRIT 36.4 (L) 03/09/2021 04:51 PM    MCV 87.5 03/09/2021 04:51 PM    MCH 28.1 03/09/2021 04:51 PM    MCHC 32.1 (L) 03/09/2021 04:51 PM    MPV 10.6 03/09/2021 04:51 PM    NEUTSPOLYS 64.90 03/09/2021 04:51 PM    LYMPHOCYTES 25.20 03/09/2021 04:51 PM    MONOCYTES 5.40 03/09/2021 04:51 PM    EOSINOPHILS 0.00 03/09/2021 04:51 PM    BASOPHILS 1.80 03/09/2021 04:51 PM    ANISOCYTOSIS 1+ 03/09/2021 04:51 PM      Lab Results   Component Value Date/Time    SODIUM 137 03/09/2021 04:51 PM    POTASSIUM 3.6 03/09/2021 04:51 PM    CHLORIDE 106 03/09/2021 04:51 PM    CO2 18 (L) 03/09/2021 04:51 PM    GLUCOSE 209 (H) 03/09/2021 04:51 PM    BUN 25 (H) 03/09/2021 04:51 PM    CREATININE 0.88 03/09/2021 04:51 PM      Lab Results   Component Value Date/Time    CHOLSTRLTOT 180 02/04/2021 02:42 AM    LDL 88 02/04/2021 02:42 AM    HDL 31 (A) 02/04/2021 02:42 AM    TRIGLYCERIDE 306 (H) 02/04/2021 02:42 AM       Lab Results   Component Value Date/Time    ALKPHOSPHAT 51 03/09/2021 04:51 PM    ASTSGOT 21 03/09/2021 04:51 PM    ALTSGPT 18 03/09/2021 04:51 PM    TBILIRUBIN 0.2 03/09/2021 04:51 PM        Imaging/Testing:    I interpreted and/or reviewed the patient's neuroimaging    DX-CHEST-PORTABLE (1 VIEW)   Final Result      No acute cardiac or pulmonary abnormality is noted.      CT-CTA NECK WITH & W/O-POST PROCESSING   Final Result      1.  CT angiogram of the neck within normal limits.      2.  Hypervascular masses anterior to the thyroid and cricoid cartilage  again noted.      CT-CTA HEAD WITH & W/O-POST PROCESS   Final Result      CT angiogram of the [place] within normal limits.      CT-CEREBRAL PERFUSION ANALYSIS   Final Result      1.  Cerebral blood flow less than 30% likely representing completed infarct = 0 mL.      2.  T Max more than 6 seconds likely representing combination of completed infarct and ischemia = 0 mL.      3.  Mismatched volume likely representing ischemic brain/penumbra = None      4.  Please note that the cerebral perfusion was performed on the limited brain tissue around the basal ganglia region. Infarct/ischemia outside the CT perfusion sections can be missed in this study.      CT-HEAD W/O   Final Result      1.  No acute intracranial process.      2.  Unchanged diffuse small vessel ischemic change.      MR-BRAIN-W/O    (Results Pending)       Assessment:    Norm Prabhakar is a 40 y.o. male with relevant history of bipolar 1 disorder, anxiety, depression, DM type II, falls, glaucoma, hypothyroidism s/p thyroidectomy, and reported seizure disorder  presenting to [facility] ER for right sided weakness, difficulty speaking, and tingling presenting for whom neurology was consulted to address possible stroke.  CT head without contrast revealed no acute intracranial process.  CTA head and neck with and without contrast revealed no LVO, high-grade stenosis, aneurysm, or dissection.  CT perfusion revealed no mismatch. Initial NIHSS was 7 for right arm and leg drift and decreased sensation to entire right hemibody; however appears to be functional weakness to RUE with drift, given minimal effort given and protection of face with dropping limb, as well as no movement or effort to RLE with positive Romero's sign. He reports subjective facial weakness, however appears function given little effort for activation bilaterally.  TPA was not given as symptoms are not consistent with acute ischemic stroke, plus had similar symptoms less than a month ago  with relatively normal neurological work-up.  Differential diagnosis includes psychogenic etiology secondary to bipolar 1 disorder and anxiety with prior diagnosis of conversion disorder versus less likely atypical migraine.    Plan:    -q4h and PRN neuro assessment. VS per nursing/unit protocol.   -Obtain MRI Brain wo contrast.   -Telemetry; currently SR. Screen for Afib/arrhythmia.   -Continue ASA 81 mg PO q day and Atorvastatin 80 mg PO q HS. Note lipid panel 2/4/21 LDL 88 (goal<70) and HDL 31 (goal>40).  -Recommend aggressive BG management per primary team. Avoid IVF with Dextrose. BG goal 140-180. Note hemoglobin A1c 9 on 2/3/21 (goal<7 for diabetic).   -PT/OT/SLP eval and treat.   -All other medical management per primary team.   -DVT PPX: SCDs.      The evaluation of the patient, and recommended management, was discussed with Dr. Barcenas, Dr. Carroll, and bedside RN.     Long Garzon, MSN MARIA DE JESUS CORRALES-BC  Nurse Practitioner, Neurohospitalist  [facility]  Clinical  of Neurology, [facility] [employer]

## 2021-03-10 NOTE — ED PROVIDER NOTES
ED Provider  Scribed for Augustus Carroll D.O. by Mindy Grimm. 3/9/2021  5:02 PM    [facility]   History obtained from: EMS   History limited by: Slurred speech    CHIEF COMPLAINT  Chief Complaint   Patient presents with    Possible Stroke     BIB EMS for Rt sided weakness, difficulty speaking, and tingling x1hr.       HPI  Norm Prabhakar is a 40 y.o. male who presents to the ED for a possible stroke. Per EMS, the patient has right sided weakness, right facial droop, and is stuttering when speaking.  Initial call was for a weakness, by the time paramedics got there he was having a right-sided weakness.  Onset was 45 minutes prior to arrival he is not currently on any blood thinners. He also did not experienced any trauma prior to the incident. No alleviating or exacerbating factors were identified. He does not currently have a neurologist.    Further history of present illness cannot be obtained due to the patient's slurred speech     REVIEW OF SYSTEMS  See HPI for further details.     Further ROS cannot be obtained due to the patient's slurred speech.     PAST MEDICAL HISTORY   has a past medical history of Anxiety, ASTHMA, Bipolar 1 disorder ([facility]ly [facility]), Depression, Diabetes ([facility]ly [facility]), Fall, Glaucoma, Glaucoma (), Hypothyroidism, Indigestion, Mental disorder, Murmur, Pneumonia, Psychiatric problem (), S/P thyroidectomy, Seizure (Formerly [facility]) (), Seizure disorder (Formerly [facility]), and Unspecified disorder of thyroid.    SOCIAL HISTORY  Social History     Tobacco Use    Smoking status: Current Every Day Smoker     Packs/day: 0.25     Types: Cigarettes, Cigars     Last attempt to quit: 2020     Years since quittin.7    Smokeless tobacco: Never Used    Tobacco comment: 3 cigarettes a day   Substance and Sexual Activity    Alcohol use: Not Currently    Drug use: Yes     Types: Inhaled     Comment: marijuana    Sexual activity: None noted       SURGICAL HISTORY   has a past surgical history that includes eye  "surgery; thyroid lobectomy; and other.    CURRENT MEDICATIONS  Current Outpatient Medications   Medication Instructions    acetaminophen (TYLENOL) 500-1,000 mg, Oral, EVERY 6 HOURS PRN    aspirin EC (ECOTRIN) 81 mg, Oral, EVERY MORNING    atorvastatin (LIPITOR) 80 mg, Oral, EVERY EVENING    budesonide-formoterol (SYMBICORT) 160-4.5 MCG/ACT Aerosol 2 Puffs, Inhalation, 2 TIMES DAILY    busPIRone (BUSPAR) 10 mg, Oral, 3 TIMES DAILY    cyclobenzaprine (FLEXERIL) 10 mg, Oral, 3 TIMES DAILY PRN    divalproex (DEPAKOTE) 1,000 mg, Oral, EVERY MORNING    divalproex (DEPAKOTE) 1,500 mg, Oral, EVERY EVENING    divalproex (DEPAKOTE) 500-1,500 mg, Oral, TWO TIMES DAILY, 500 mg AM<BR>1500 mg PM    fish oil 1,000 mg, Oral, 2 TIMES DAILY    ibuprofen (MOTRIN) 600 mg, Oral, EVERY 6 HOURS PRN    insulin glargine 23 Units, Subcutaneous, TWO TIMES DAILY, Decrease to 20 units for BG less than 90    Jardiance 25 mg, Oral, DAILY    levothyroxine (SYNTHROID) 200 mcg, Oral, EACH MORNING ON EMPTY STOMACH    lidocaine (LIDODERM) 5 % Patch 1 Patch, Transdermal, EVERY 24 HOURS    lisinopril (PRINIVIL) 10 mg, Oral, DAILY    lurasidone (LATUDA) 20 mg, Oral, WITH PM MEAL    metformin (GLUCOPHAGE) 1,000 mg, Oral, 2 TIMES DAILY WITH MEALS    montelukast (SINGULAIR) 10 mg, Oral, EVERY BEDTIME    naproxen (NAPROSYN) 500 mg, Oral, 2 TIMES DAILY WITH MEALS    ondansetron (ZOFRAN ODT) 4 mg, Oral, EVERY 6 HOURS PRN    polyethylene glycol 3350 (MIRALAX) 17 g, Oral, DAILY    prazosin (MINIPRESS) 4 mg, Oral, EVERY BEDTIME    sertraline (ZOLOFT) 100 mg, Oral, DAILY    topiramate (TOPAMAX) 25 mg, Oral, 2 TIMES DAILY    topiramate (TOPAMAX) 25 mg, Oral, DAILY    Vitamin D3 4,000 Units, Oral, EVERY BEDTIME       ALLERGIES  Allergies   Allergen Reactions    Geodon [Ziprasidone Hcl] Anaphylaxis     Anaphylaxis per patient    Abilify      \"Feeling tired, like I don't even know whats going on around me\"    Fish      Pt reports fish causes him to be sick to his " stomach  Not listed on MAR noted 2/3/2021         PHYSICAL EXAM  VITAL SIGNS: Temp 37.2 °C (98.9 °F) (Temporal)   Constitutional: Alert in no apparent distress.  HENT: No signs of trauma, mucous membranes are moist  Eyes: Conjunctiva normal, Non-icteric.   Neck: Normal range of motion, No tenderness, Supple.  Lymphatic: No lymphadenopathy noted.   Cardiovascular: Regular rate and rhythm, no murmurs.   Thorax & Lungs: Normal breath sounds, No respiratory distress, No wheezing, No chest tenderness.   Abdomen: Bowel sounds normal, Soft, No tenderness, No masses, No pulsatile masses. No peritoneal signs.  Skin: Warm, Dry, normal color.   Back: No bony tenderness, No CVA tenderness.   Extremities: No edema, No tenderness, No cyanosis  Musculoskeletal: Good range of motion in all major joints. No tenderness to palpation or major deformities noted.   Neurologic: Right facial droop, slurred speech, significant weakness in the right upper extremity and right lower extremity, severe weakness, decreased sensation of right side.   Psychiatric: Affect normal, Judgment normal, Mood normal.       DIAGNOSTIC STUDIES / PROCEDURES    EKG  12 Lead EKG interpreted by me shown below.    LABS  Results for orders placed or performed during the hospital encounter of 03/09/21   CBC WITH DIFFERENTIAL   Result Value Ref Range    WBC 8.9 4.8 - 10.8 K/uL    RBC 4.16 (L) 4.70 - 6.10 M/uL    Hemoglobin 11.7 (L) 14.0 - 18.0 g/dL    Hematocrit 36.4 (L) 42.0 - 52.0 %    MCV 87.5 81.4 - 97.8 fL    MCH 28.1 27.0 - 33.0 pg    MCHC 32.1 (L) 33.7 - 35.3 g/dL    RDW 44.3 35.9 - 50.0 fL    Platelet Count 289 164 - 446 K/uL    MPV 10.6 9.0 - 12.9 fL    Neutrophils-Polys 64.90 44.00 - 72.00 %    Lymphocytes 25.20 22.00 - 41.00 %    Monocytes 5.40 0.00 - 13.40 %    Eosinophils 0.00 0.00 - 6.90 %    Basophils 1.80 0.00 - 1.80 %    Nucleated RBC 0.00 /100 WBC    Neutrophils (Absolute) 5.94 1.82 - 7.42 K/uL    Lymphs (Absolute) 2.24 1.00 - 4.80 K/uL    Monos  (Absolute) 0.48 0.00 - 0.85 K/uL    Eos (Absolute) 0.00 0.00 - 0.51 K/uL    Baso (Absolute) 0.16 (H) 0.00 - 0.12 K/uL    NRBC (Absolute) 0.00 K/uL    Anisocytosis 1+     Microcytosis 1+    COMP METABOLIC PANEL   Result Value Ref Range    Sodium 137 135 - 145 mmol/L    Potassium 3.6 3.6 - 5.5 mmol/L    Chloride 106 96 - 112 mmol/L    Co2 18 (L) 20 - 33 mmol/L    Anion Gap 13.0 7.0 - 16.0    Glucose 209 (H) 65 - 99 mg/dL    Bun 25 (H) 8 - 22 mg/dL    Creatinine 0.88 0.50 - 1.40 mg/dL    Calcium 8.4 (L) 8.5 - 10.5 mg/dL    AST(SGOT) 21 12 - 45 U/L    ALT(SGPT) 18 2 - 50 U/L    Alkaline Phosphatase 51 30 - 99 U/L    Total Bilirubin 0.2 0.1 - 1.5 mg/dL    Albumin 4.1 3.2 - 4.9 g/dL    Total Protein 6.4 6.0 - 8.2 g/dL    Globulin 2.3 1.9 - 3.5 g/dL    A-G Ratio 1.8 g/dL   PROTHROMBIN TIME   Result Value Ref Range    PT 13.3 12.0 - 14.6 sec    INR 0.99 0.87 - 1.13   APTT   Result Value Ref Range    APTT 26.5 24.7 - 36.0 sec   COD (ADULT)   Result Value Ref Range    ABO Grouping Only A     Rh Grouping Only POS     Antibody Screen-Cod NEG    TROPONIN   Result Value Ref Range    Troponin T 23 (H) 6 - 19 ng/L   SARS-CoV-2 PCR (24 hour In-House): Collect NP swab in VTM    Specimen: Respirate   Result Value Ref Range    SARS-CoV-2 Source NP Swab    DIAGNOSTIC ALCOHOL   Result Value Ref Range    Diagnostic Alcohol <10.1 0.0 - 10.0 mg/dL   DIFFERENTIAL MANUAL   Result Value Ref Range    Bands-Stabs 1.80 0.00 - 10.00 %    Myelocytes 0.90 %    Manual Diff Status PERFORMED    PERIPHERAL SMEAR REVIEW   Result Value Ref Range    Peripheral Smear Review see below    PLATELET ESTIMATE   Result Value Ref Range    Plt Estimation Normal    MORPHOLOGY   Result Value Ref Range    RBC Morphology Present     Polychromia 1+     Toxic Gran Slight    ESTIMATED GFR   Result Value Ref Range    GFR If African American >60 >60 mL/min/1.73 m 2    GFR If Non African American >60 >60 mL/min/1.73 m 2   Hemoglobin A1C   Result Value Ref Range     Glycohemoglobin 9.8 (H) 4.0 - 5.6 %    Est Avg Glucose 235 mg/dL   ACCU-CHEK GLUCOSE   Result Value Ref Range    Glucose - Accu-Ck 170 (H) 65 - 99 mg/dL   EKG (NOW)   Result Value Ref Range    Report       [facility] Emergency Dept.    Test Date:  2021  Pt Name:    HOLLY KIM                 Department: ER  MRN:        [id number]                      Room:        11  Gender:     Male                         Technician: [id number]  :        1980                   Requested By:VIKY FONSECA  Order #:    [id number]                    Reading MD: VIKY FONSECA D.O.    Measurements  Intervals                                Axis  Rate:       80                           P:          54  KS:         171                          QRS:        32  QRSD:       124                          T:          35  QT:         370  QTc:        427    Interpretive Statements  Sinus rhythm  Nonspecific intraventricular conduction delay  Compared to ECG 2021 12:59:05  No significant changes  Electronically Signed On 3-9-2021 17:44:41 PST by VIKY FONSECA D.O.         All labs reviewed by me.    RADIOLOGY  DX-CHEST-PORTABLE (1 VIEW)   Final Result      No acute cardiac or pulmonary abnormality is noted.      CT-CTA NECK WITH & W/O-POST PROCESSING   Final Result      1.  CT angiogram of the neck within normal limits.      2.  Hypervascular masses anterior to the thyroid and cricoid cartilage again noted.      CT-CTA HEAD WITH & W/O-POST PROCESS   Final Result      CT angiogram of the [place] within normal limits.      CT-CEREBRAL PERFUSION ANALYSIS   Final Result      1.  Cerebral blood flow less than 30% likely representing completed infarct = 0 mL.      2.  T Max more than 6 seconds likely representing combination of completed infarct and ischemia = 0 mL.      3.  Mismatched volume likely representing ischemic brain/penumbra = None      4.  Please note that the cerebral perfusion was  performed on the limited brain tissue around the basal ganglia region. Infarct/ischemia outside the CT perfusion sections can be missed in this study.      CT-HEAD W/O   Final Result      1.  No acute intracranial process.      2.  Unchanged diffuse small vessel ischemic change.      MR-BRAIN-W/O    (Results Pending)     The radiologist's interpretations of all radiological studies have been reviewed by me.    Films have been independently by me      COURSE  Pertinent Labs & Imaging studies reviewed. (See chart for details)    Review of past medical records shows the patient was in the ED 10/16/20 and 2/3/21 for right sided weakness. He has also had 5 ER visits in the last month for various complaints.     5:02 PM - Patient seen and examined at bedside. Discussed plan of care. Ordered for EKG, CT-CTA head, CT-CTA neck, CT-cerebral, CT-head, DX-chest, troponin, COD, APTT, prothrombin, CMP, and CBC w/ diff to evaluate his symptoms.     5:10 PM I discussed the patient's case and the above findings with Dr. Barcenas (Neuro).    5:44 PM Paged Hospitalist. Ordered for COVID testing to evaluate the patient's symptoms.     5:51 PM - Ordered diagnostic alcohol to evaluate the patient's symptoms.     5:59 PM I discussed the patient's case and the above findings with Dr. Pina (Hospitalist) who agrees to evaluate the patient for hospitalization.        MEDICAL DECISION MAKING  This is a 40 y.o. male who presents with a right-sided weakness.  Initial stroke scale was high.  But CT shows no signs of vascular occlusion, or stroke.  Notes were reviewed and shows that this is his third visit for similar symptoms.  I saw him here just a few days ago for complaints of a left shoulder pain from playing basketball.  He has been here several times in the last month for various complaints.    Spoke with neurology.  They do not believe the patient is a candidate for TPA due to the recurrent symptomatology of this.  On reevaluation his  symptoms have not changed.      DISPOSITION:  Patient will be hospitalized by Dr. Pina in guarded condition.      FINAL IMPRESSION  1. Acute CVA (cerebrovascular accident) (HCC)    2. Hemiparesis of right dominant side, unspecified hemiparesis etiology (HCC)         Mindy ROB (Scribe), am scribing for, and in the presence of, Augustus Carroll D.O..    Electronically signed by: Mindy Grimm (Scribe), 3/9/2021    Augustus ROB D.O. personally performed the services described in this documentation, as scribed by Mindy Grimm in my presence, and it is both accurate and complete. C.    The note accurately reflects work and decisions made by me.  Augustus Carroll D.O.  3/9/2021  11:37 PM

## 2021-03-10 NOTE — THERAPY
Occupational Therapy   Initial Evaluation     Patient Name: Norm Prabhakar  Age:  40 y.o., Sex:  male  Medical Record #: [id number]  Today's Date: 3/10/2021     Precautions  Precautions: Fall Risk, Swallow Precautions ( See Comments)  Comments: hx of conversion disorder,R sided weakness    Assessment  Norm Prabhakar is a 40 y.o. male with history of diabetes, hypothyroidism, and seizure disorder who presents with a chief complaint of right-sided upper extremity weakness and numbness. He has a history of conversion disorder, diabetes, hypthyroidism, and seizure disorder. He was seen for OT evaluation today and presented w/ decreased cognition, decreased activity tolerance, and R sided deficits that fluctuate. The patient would benefit from post acute placement after acute care stay.     Plan    Recommend Occupational Therapy 3 times per week until therapy goals are met for the following treatments:  Adaptive Equipment, Cognitive Skill Development, Self Care/Activities of Daily Living, Therapeutic Activities and Therapeutic Exercises.    DC Equipment Recommendations: (P) Unable to determine at this time  Discharge Recommendations: (P) Recommend post-acute placement for additional occupational therapy services prior to discharge home     Subjective    Pleasant and cooperative     Objective     03/10/21 0956   Initial Contact Note    Initial Contact Note Order Received and Verified, Occupational Therapy Evaluation in Progress with Full Report to Follow.   Prior Living Situation   Prior Services Intermittent Physical Support for ADL Per Service   Housing / Facility Group Home  ([facility])   Bathroom Set up Bathtub / Shower Combination   Equipment Owned None   Lives with - Patient's Self Care Capacity Unrelated Adult;Attendant / Paid Care Giver   Comments Pt has roommates. They all perform ADLs/IADLs.   Prior Level of ADL Function   Self Feeding Independent   Grooming / Hygiene Independent   Bathing Independent    Dressing Independent   Toileting Independent   Prior Level of IADL Function   Medication Management Independent   Laundry Independent   Kitchen Mobility Independent   Finances Independent   Home Management Independent   Shopping Requires Assist  (group home owner drives to store 1x a week)   Prior Level Of Mobility Independent Without Device in Community;Independent Without Device in Home   Driving / Transportation Relatives / Others Provide Transportation   Occupation (Pre-Hospital Vocational) Not Employed   Comments Prior living/ADL/IADL fxn from previous admit eval note   Precautions   Precautions Fall Risk;Swallow Precautions ( See Comments)   Comments Hx of conversion disorder and R sided weakness   Pain 0 - 10 Group   Therapist Pain Assessment 0;Nurse Notified   Cognition    Cognition / Consciousness X   Speech/ Communication Delayed Responses   Level of Consciousness Alert   Initiation Impaired   Comments inconsistant use of R UE/LE   Passive ROM Upper Body   Passive ROM Upper Body WDL   Active ROM Upper Body   Active ROM Upper Body  X   Dominant Hand Right   Comments no AROM w/ formal testing; light touch AAROM pt able to reach full ROM   Strength Upper Body   Upper Body Strength  X   Comments reports that he can not move R UE; w/ light touch assist, able to lift R UE to full ranges   Sensation Upper Body   Upper Extremity Sensation  X   Comments Unable to detect light touch R UE   Neurological Concerns   Neurological Concerns No   Coordination Upper Body   Coordination X   Comments R UE formal testing limited   Balance Assessment   Sitting Balance (Static) Fair +   Sitting Balance (Dynamic) Fair +   Standing Balance (Static) Poor +   Standing Balance (Dynamic) Poor +   Weight Shift Sitting Fair   Weight Shift Standing Poor   Comments HHA to stand EOB   Bed Mobility    Supine to Sit Moderate Assist   Sit to Supine Moderate Assist   Scooting Minimal Assist   Comments Mod A for R leg and assist to pull up    ADL Assessment   Grooming Supervision  (sitting EOB)   Lower Body Dressing Moderate Assist  (able to pull socks up once started)   Toileting   (declined need)   Comments difficulty starting socks   Functional Mobility   Sit to Stand Moderate Assist   Mobility EOB only   Comments assist x2 to stand w/ HHA   Visual Perception   Visual Perception  X   Comments wears corrective lenses, blind in L eye   Activity Tolerance   Sitting Edge of Bed ~15-20 minutes   Standing <2 min   Comments HHA x2 for standing   Patient / Family Goals   Patient / Family Goal #1 to go home   Short Term Goals   Short Term Goal # 1 Pt will perform toilet txf w/ supv   Short Term Goal # 2 Pt will perform LB dressing w/ supv   Short Term Goal # 3 Pt will perform UB dressing w/ supv   Short Term Goal # 4 Pt will groom in stance w/ supv   Problem List   Problem List Decreased Active Daily Living Skills;Decreased Homemaking Skills;Decreased Upper Extremity Strength Right;Decreased Upper Extremity AROM Right;Decreased Functional Mobility;Decreased Activity Tolerance;Impaired Posture / Trunk Alignment;Impaired Coordination Right Upper Extremity;Impaired Sensation Right Upper Extremity;Impaired Cognitive Function;Impaired Postural Control / Balance   Interdisciplinary Plan of Care Collaboration   IDT Collaboration with  Nursing;Physical Therapist   Patient Position at End of Therapy In Bed;Bed Alarm On;Call Light within Reach;Tray Table within Reach;Phone within Reach   Collaboration Comments Report given to RN; co-manuel w/ PT

## 2021-03-10 NOTE — DISCHARGE PLANNING
[facility] Transitional Care Coordination     Referral from:  Dr. Colón    Facesheet indicates: ANT ANABELL    Potential Rehab Diagnosis:  CVA?    Chart review indicates patient may have on going medical management and may have therapy needs to possibly meet inpatient rehab facility criteria with the goal of returning to community.    D/C support: TBD     Physiatry consultation pended per protocol.      CVA?  W/U & TX pending.  Waiting on additional information to determine appropriateness for acute inpatient rehabilitation. Will continue to follow.     Thank you for the referral.

## 2021-03-11 ENCOUNTER — APPOINTMENT (OUTPATIENT)
Dept: RADIOLOGY | Facility: MEDICAL CENTER | Age: 41
DRG: 880 | End: 2021-03-11
Attending: HOSPITALIST
Payer: MEDICAID

## 2021-03-11 LAB
ANION GAP SERPL CALC-SCNC: 15 MMOL/L (ref 7–16)
BASOPHILS # BLD AUTO: 0.9 % (ref 0–1.8)
BASOPHILS # BLD: 0.09 K/UL (ref 0–0.12)
BUN SERPL-MCNC: 22 MG/DL (ref 8–22)
CALCIUM SERPL-MCNC: 9.1 MG/DL (ref 8.5–10.5)
CHLORIDE SERPL-SCNC: 100 MMOL/L (ref 96–112)
CO2 SERPL-SCNC: 21 MMOL/L (ref 20–33)
CREAT SERPL-MCNC: 0.97 MG/DL (ref 0.5–1.4)
EOSINOPHIL # BLD AUTO: 0.22 K/UL (ref 0–0.51)
EOSINOPHIL NFR BLD: 2.2 % (ref 0–6.9)
ERYTHROCYTE [DISTWIDTH] IN BLOOD BY AUTOMATED COUNT: 45.2 FL (ref 35.9–50)
GLUCOSE BLD-MCNC: 121 MG/DL (ref 65–99)
GLUCOSE BLD-MCNC: 155 MG/DL (ref 65–99)
GLUCOSE BLD-MCNC: 167 MG/DL (ref 65–99)
GLUCOSE BLD-MCNC: 170 MG/DL (ref 65–99)
GLUCOSE BLD-MCNC: 94 MG/DL (ref 65–99)
GLUCOSE SERPL-MCNC: 114 MG/DL (ref 65–99)
HCT VFR BLD AUTO: 39.7 % (ref 42–52)
HGB BLD-MCNC: 12.7 G/DL (ref 14–18)
IMM GRANULOCYTES # BLD AUTO: 0.22 K/UL (ref 0–0.11)
IMM GRANULOCYTES NFR BLD AUTO: 2.2 % (ref 0–0.9)
LYMPHOCYTES # BLD AUTO: 3.97 K/UL (ref 1–4.8)
LYMPHOCYTES NFR BLD: 39.8 % (ref 22–41)
MCH RBC QN AUTO: 28.2 PG (ref 27–33)
MCHC RBC AUTO-ENTMCNC: 32 G/DL (ref 33.7–35.3)
MCV RBC AUTO: 88.2 FL (ref 81.4–97.8)
MONOCYTES # BLD AUTO: 0.92 K/UL (ref 0–0.85)
MONOCYTES NFR BLD AUTO: 9.2 % (ref 0–13.4)
NEUTROPHILS # BLD AUTO: 4.55 K/UL (ref 1.82–7.42)
NEUTROPHILS NFR BLD: 45.7 % (ref 44–72)
NRBC # BLD AUTO: 0 K/UL
NRBC BLD-RTO: 0 /100 WBC
PLATELET # BLD AUTO: 303 K/UL (ref 164–446)
PMV BLD AUTO: 10.1 FL (ref 9–12.9)
POTASSIUM SERPL-SCNC: 3.9 MMOL/L (ref 3.6–5.5)
RBC # BLD AUTO: 4.5 M/UL (ref 4.7–6.1)
SODIUM SERPL-SCNC: 136 MMOL/L (ref 135–145)
WBC # BLD AUTO: 10 K/UL (ref 4.8–10.8)

## 2021-03-11 PROCEDURE — 85025 COMPLETE CBC W/AUTO DIFF WBC: CPT

## 2021-03-11 PROCEDURE — 92523 SPEECH SOUND LANG COMPREHEN: CPT

## 2021-03-11 PROCEDURE — 70551 MRI BRAIN STEM W/O DYE: CPT

## 2021-03-11 PROCEDURE — 700111 HCHG RX REV CODE 636 W/ 250 OVERRIDE (IP): Performed by: HOSPITALIST

## 2021-03-11 PROCEDURE — 770020 HCHG ROOM/CARE - TELE (206)

## 2021-03-11 PROCEDURE — 99233 SBSQ HOSP IP/OBS HIGH 50: CPT | Performed by: STUDENT IN AN ORGANIZED HEALTH CARE EDUCATION/TRAINING PROGRAM

## 2021-03-11 PROCEDURE — 700105 HCHG RX REV CODE 258: Performed by: HOSPITALIST

## 2021-03-11 PROCEDURE — A9270 NON-COVERED ITEM OR SERVICE: HCPCS | Performed by: HOSPITALIST

## 2021-03-11 PROCEDURE — 36415 COLL VENOUS BLD VENIPUNCTURE: CPT

## 2021-03-11 PROCEDURE — 80048 BASIC METABOLIC PNL TOTAL CA: CPT

## 2021-03-11 PROCEDURE — 92526 ORAL FUNCTION THERAPY: CPT

## 2021-03-11 PROCEDURE — 97535 SELF CARE MNGMENT TRAINING: CPT

## 2021-03-11 PROCEDURE — 700102 HCHG RX REV CODE 250 W/ 637 OVERRIDE(OP): Performed by: HOSPITALIST

## 2021-03-11 PROCEDURE — 82962 GLUCOSE BLOOD TEST: CPT | Mod: 91

## 2021-03-11 RX ORDER — DEXTROSE MONOHYDRATE 25 G/50ML
50 INJECTION, SOLUTION INTRAVENOUS
Status: DISCONTINUED | OUTPATIENT
Start: 2021-03-11 | End: 2021-03-11

## 2021-03-11 RX ORDER — DEXTROSE MONOHYDRATE 25 G/50ML
50 INJECTION, SOLUTION INTRAVENOUS
Status: DISCONTINUED | OUTPATIENT
Start: 2021-03-11 | End: 2021-03-15 | Stop reason: HOSPADM

## 2021-03-11 RX ADMIN — SERTRALINE HYDROCHLORIDE 100 MG: 100 TABLET, FILM COATED ORAL at 05:35

## 2021-03-11 RX ADMIN — INSULIN GLARGINE 23 UNITS: 100 INJECTION, SOLUTION SUBCUTANEOUS at 07:54

## 2021-03-11 RX ADMIN — METFORMIN HYDROCHLORIDE 1000 MG: 500 TABLET ORAL at 08:00

## 2021-03-11 RX ADMIN — INSULIN HUMAN 2 UNITS: 100 INJECTION, SOLUTION PARENTERAL at 05:42

## 2021-03-11 RX ADMIN — ASPIRIN 325 MG ORAL TABLET 325 MG: 325 PILL ORAL at 05:35

## 2021-03-11 RX ADMIN — METFORMIN HYDROCHLORIDE 1000 MG: 500 TABLET ORAL at 17:00

## 2021-03-11 RX ADMIN — LISINOPRIL 10 MG: 10 TABLET ORAL at 05:35

## 2021-03-11 RX ADMIN — ENOXAPARIN SODIUM 40 MG: 40 INJECTION SUBCUTANEOUS at 05:35

## 2021-03-11 RX ADMIN — MONTELUKAST 10 MG: 10 TABLET, FILM COATED ORAL at 21:33

## 2021-03-11 RX ADMIN — INSULIN HUMAN 2 UNITS: 100 INJECTION, SOLUTION PARENTERAL at 17:05

## 2021-03-11 RX ADMIN — OMEGA-3 FATTY ACIDS CAP 1000 MG 1000 MG: 1000 CAP at 05:36

## 2021-03-11 RX ADMIN — OMEGA-3 FATTY ACIDS CAP 1000 MG 1000 MG: 1000 CAP at 17:00

## 2021-03-11 RX ADMIN — DIVALPROEX SODIUM 1500 MG: 500 TABLET, DELAYED RELEASE ORAL at 17:00

## 2021-03-11 RX ADMIN — LURASIDONE HYDROCHLORIDE 20 MG: 20 TABLET, FILM COATED ORAL at 17:00

## 2021-03-11 RX ADMIN — INSULIN GLARGINE 23 UNITS: 100 INJECTION, SOLUTION SUBCUTANEOUS at 21:33

## 2021-03-11 RX ADMIN — ACETAMINOPHEN 650 MG: 325 TABLET, FILM COATED ORAL at 18:04

## 2021-03-11 RX ADMIN — BUDESONIDE AND FORMOTEROL FUMARATE DIHYDRATE 2 PUFF: 160; 4.5 AEROSOL RESPIRATORY (INHALATION) at 17:01

## 2021-03-11 RX ADMIN — ATORVASTATIN CALCIUM 80 MG: 80 TABLET, FILM COATED ORAL at 17:00

## 2021-03-11 RX ADMIN — SODIUM CHLORIDE, POTASSIUM CHLORIDE, SODIUM LACTATE AND CALCIUM CHLORIDE: 600; 310; 30; 20 INJECTION, SOLUTION INTRAVENOUS at 21:44

## 2021-03-11 RX ADMIN — LEVOTHYROXINE SODIUM 200 MCG: 0.2 TABLET ORAL at 05:35

## 2021-03-11 RX ADMIN — DIVALPROEX SODIUM 500 MG: 500 TABLET, DELAYED RELEASE ORAL at 05:35

## 2021-03-11 RX ADMIN — BUDESONIDE AND FORMOTEROL FUMARATE DIHYDRATE 2 PUFF: 160; 4.5 AEROSOL RESPIRATORY (INHALATION) at 05:35

## 2021-03-11 RX ADMIN — SODIUM CHLORIDE, POTASSIUM CHLORIDE, SODIUM LACTATE AND CALCIUM CHLORIDE: 600; 310; 30; 20 INJECTION, SOLUTION INTRAVENOUS at 08:03

## 2021-03-11 ASSESSMENT — ENCOUNTER SYMPTOMS
MYALGIAS: 0
PALPITATIONS: 0
NAUSEA: 0
SPUTUM PRODUCTION: 0
FEVER: 0
FOCAL WEAKNESS: 1
WHEEZING: 0
SHORTNESS OF BREATH: 0
DIAPHORESIS: 0
BACK PAIN: 1
SPEECH CHANGE: 1
WEAKNESS: 0
SORE THROAT: 0
SINUS PAIN: 0
NERVOUS/ANXIOUS: 0
DEPRESSION: 0
EYE DISCHARGE: 0
DIZZINESS: 0
ABDOMINAL PAIN: 0
VOMITING: 0
SENSORY CHANGE: 1
COUGH: 0
EYE REDNESS: 0
CHILLS: 0
NECK PAIN: 0
HEADACHES: 0

## 2021-03-11 ASSESSMENT — PAIN DESCRIPTION - PAIN TYPE: TYPE: ACUTE PAIN

## 2021-03-11 ASSESSMENT — COGNITIVE AND FUNCTIONAL STATUS - GENERAL
HELP NEEDED FOR BATHING: A LITTLE
DAILY ACTIVITIY SCORE: 18
DRESSING REGULAR LOWER BODY CLOTHING: A LITTLE
TOILETING: A LITTLE
SUGGESTED CMS G CODE MODIFIER DAILY ACTIVITY: CK
EATING MEALS: A LITTLE
PERSONAL GROOMING: A LITTLE
DRESSING REGULAR UPPER BODY CLOTHING: A LITTLE

## 2021-03-11 NOTE — PROGRESS NOTES
Monitor summary: SB/SR 50-65, OR .16, QRS .10, QT 40, with rare PACs per strip from monitor room.

## 2021-03-11 NOTE — PROGRESS NOTES
Monitor summary: SB/SR, 51-63 (touched down to 44), TN 0.20, QRS 0.08, QT 0.40, with rare PACs per strip from monitor room.

## 2021-03-11 NOTE — CARE PLAN
Problem: Communication  Goal: The ability to communicate needs accurately and effectively will improve  Outcome: PROGRESSING AS EXPECTED  Note: Patient continues to have stuttering and difficulty word finding. Slowly, patient able to communicate needs with staff appropriately.      Problem: Safety  Goal: Will remain free from injury  Outcome: PROGRESSING AS EXPECTED  Note: Safety precautions in place. Patient demonstrates appropriate use of call light. Patient understanding of right sided weakness and asks for assistance appropriately.

## 2021-03-11 NOTE — THERAPY
"Speech Language Pathology  Daily Treatment     Patient Name: Norm Prabhakar  Age:  40 y.o., Sex:  male  Medical Record #: [id number]  Today's Date: 3/11/2021     Precautions  Precautions: Fall Risk, Swallow Precautions ( See Comments)  Comments: Hx of conversion disorder; R sided weakness    Assessment    Pt was seen for dysphagia tx focused on reassessment of oropharyngeal swallow skills w/ breakfast meal of soft/bite sized solids, thin liquids and tx trials of regular solids. Pt demo’d adequate mastication of crunchier/dry/sticky solids, appropriately using lingual sweeps and liquid rinses to clear any oral residue. No s/sx of aspiration occurred with PO intake. Pt did cough x1 while talking, reporting he had trouble swallowing “mucous,” which he states happens occasionally (“I bet that happens to you too!”). Pt appears appropriate for a diet upgrade to regular solids/thin liquids, which is his baseline diet. Please assist w/ tray set up A. Ok to give pills whole one at a time w/ thins.     Plan    Continue current treatment plan.    Discharge Recommendations: Recommend home health for continued speech therapy services for dysfluency. Anticipate that the patient will have no further speech therapy needs for dysphagia after discharge from the hospital.      Subjective    Pt was A&Ox4, cooperative, agreeable to SLP POC.  \"I once ate a whole jar of peanut butter.\"  \"I love steak.\"    Objective       03/11/21 0930   Dysphagia    Positioning / Behavior Modification Self Monitoring;Modulate Rate or Bite Size   Diet / Liquid Recommendation Regular (7);Thin (0)   Recommended Route of Medication Administration   Medication Administration  Whole with Liquid Wash   Short Term Goals   Short Term Goal # 1 Pt will consume SB6/TNO ADA with use of posted and recommended swallow stratgies and without s/s of difficulty during oral intake.    Goal Outcome # 1 Goal met, new goal added   Short Term Goal # 1 B  Patient will consume a " regular/thin liquid diet with no s/sx of aspiration given set-up A.

## 2021-03-11 NOTE — PROGRESS NOTES
[facility] Medicine Daily Progress Note    Date of Service  3/10/2021    Chief Complaint  40 y.o. male admitted 3/9/2021 with right-sided weakness, difficulty speaking.    Interval Problem Update  No acute complaints this morning apart from mild headache, which patient reports is relatively chronic.  Denies chest pain, abdominal pain.    Consultants/Specialty  Neurology.    Code Status  Full Code    Disposition  Neurology unit.    Review of Systems  Review of Systems   Constitutional: Negative for chills, diaphoresis and fever.   HENT: Negative for congestion, hearing loss, sinus pain and sore throat.    Eyes: Negative for discharge and redness.        Positive for blindness in left eye, chronic.   Respiratory: Negative for cough, sputum production, shortness of breath and wheezing.    Cardiovascular: Negative for chest pain and palpitations.   Gastrointestinal: Negative for abdominal pain, nausea and vomiting.   Genitourinary: Negative for dysuria and urgency.   Musculoskeletal: Positive for back pain (Chronic). Negative for myalgias and neck pain.   Skin: Negative for itching.   Neurological: Positive for sensory change, speech change, focal weakness and headaches. Negative for dizziness and weakness.   Endo/Heme/Allergies: Negative for environmental allergies.   Psychiatric/Behavioral: Negative for depression. The patient is not nervous/anxious.              Physical Exam  Temp:  [36.1 °C (96.9 °F)-36.7 °C (98 °F)] 36.5 °C (97.7 °F)  Pulse:  [57-77] 57  Resp:  [16-33] 16  BP: (110-127)/(70-77) 110/70  SpO2:  [92 %-97 %] 95 %    Physical Exam  Vitals and nursing note reviewed.   Constitutional:       General: He is not in acute distress.     Appearance: He is obese. He is not toxic-appearing or diaphoretic.   HENT:      Head: Normocephalic and atraumatic.      Nose: No congestion.      Mouth/Throat:      Mouth: Mucous membranes are moist.      Pharynx: Oropharynx is clear.   Eyes:      General: No scleral icterus.      Conjunctiva/sclera: Conjunctivae normal.   Cardiovascular:      Rate and Rhythm: Normal rate and regular rhythm.      Pulses: Normal pulses.      Heart sounds: Normal heart sounds. No murmur.   Pulmonary:      Effort: Pulmonary effort is normal. No respiratory distress.      Breath sounds: Normal breath sounds. No stridor. No wheezing.   Abdominal:      General: Bowel sounds are normal. There is no distension.      Palpations: Abdomen is soft.      Tenderness: There is no abdominal tenderness. There is no guarding or rebound.   Musculoskeletal:         General: No swelling or tenderness.   Skin:     Capillary Refill: Capillary refill takes less than 2 seconds.      Coloration: Skin is not jaundiced.   Neurological:      Mental Status: He is alert and oriented to person, place, and time. Mental status is at baseline.      Comments: Speech is dysarthric with occasional word finding difficulties did  2 through 12 intact with exception of left eye (chronic blindness).  Strength left side 5 out of 5.  Right side patient with 0-5 strength on strength testing.  Ever, when I lift his right arm and test for tone and that goes his arm, he controls it on its way down.   Psychiatric:         Mood and Affect: Mood normal.         Behavior: Behavior normal.         Fluids    Intake/Output Summary (Last 24 hours) at 3/10/2021 1811  Last data filed at 3/10/2021 1300  Gross per 24 hour   Intake 250 ml   Output 1100 ml   Net -850 ml       Laboratory  Recent Labs     03/09/21  1651   WBC 8.9   RBC 4.16*   HEMOGLOBIN 11.7*   HEMATOCRIT 36.4*   MCV 87.5   MCH 28.1   MCHC 32.1*   RDW 44.3   PLATELETCT 289   MPV 10.6     Recent Labs     03/09/21  1651 03/10/21  0348   SODIUM 137 137   POTASSIUM 3.6 3.8   CHLORIDE 106 103   CO2 18* 20   GLUCOSE 209* 164*   BUN 25* 24*   CREATININE 0.88 0.92   CALCIUM 8.4* 8.5     Recent Labs     03/09/21  1651   APTT 26.5   INR 0.99         Recent Labs     03/10/21  0348   TRIGLYCERIDE 636*   HDL 29*    LDL see below       Imaging  DX-CHEST-PORTABLE (1 VIEW)   Final Result      No acute cardiac or pulmonary abnormality is noted.      CT-CTA NECK WITH & W/O-POST PROCESSING   Final Result      1.  CT angiogram of the neck within normal limits.      2.  Hypervascular masses anterior to the thyroid and cricoid cartilage again noted.      CT-CTA HEAD WITH & W/O-POST PROCESS   Final Result      CT angiogram of the [place] within normal limits.      CT-CEREBRAL PERFUSION ANALYSIS   Final Result      1.  Cerebral blood flow less than 30% likely representing completed infarct = 0 mL.      2.  T Max more than 6 seconds likely representing combination of completed infarct and ischemia = 0 mL.      3.  Mismatched volume likely representing ischemic brain/penumbra = None      4.  Please note that the cerebral perfusion was performed on the limited brain tissue around the basal ganglia region. Infarct/ischemia outside the CT perfusion sections can be missed in this study.      CT-HEAD W/O   Final Result      1.  No acute intracranial process.      2.  Unchanged diffuse small vessel ischemic change.      MR-BRAIN-W/O    (Results Pending)        Assessment/Plan  Right hemiparesis (HCC)- (present on admission)  Assessment & Plan  Etiology is elusive.  Patient does have a history of migraines and seizures and certainly these could be atypical presentations of these processes.  Additionally he could still have an ischemic event though no evidence has been seen on the CT.  We will check an MRI to rule this out.  Additionally he could have some contribution of psychogenic etiology as his physical exam is somewhat inconsistent.  Admit for MRI; pending.   Neurology has been consulted  Maintain n.p.o. until speech can evaluate  PT OT and speech consultations  Aspirin and high-dose statin pending MRI brain  We will defer further stroke work-up unless the MRI indicates an acute ischemic event.  Agree with the emergency room  physician and neurology that this patient is not a TPA candidate.  I think this medication would carry a greater risk than benefit for him    Seizure disorder (HCC)- (present on admission)  Assessment & Plan  Patient is maintained on Topamax and Depakote, we will continue his baseline doses.  Neurology has been consulted    HTN (hypertension)- (present on admission)  Assessment & Plan  Patient is maintained on prazosin, lisinopril as an outpatient.  Continue to doubt this is an acute ischemic event; continue his home medications monitor and titrate as appropriate    HLD (hyperlipidemia)- (present on admission)  Assessment & Plan  Continue high-dose statin    Type 2 diabetes mellitus without complication, without long-term current use of insulin (HCC)- (present on admission)  Assessment & Plan  Patient is maintained on Metformin, empagliflozin, and insulin Lantus 23 units twice daily.  We will continue his baseline therapy regimen with sliding scale coverage beyond that.  His last A1c was 9.0 and that was 1 month ago.    PTSD (post-traumatic stress disorder)- (present on admission)  Assessment & Plan  Continue home psychiatric regimen    Asthma- (present on admission)  Assessment & Plan  Placed on O2 and RT protocols  Continue inhaled steroid and long-acting beta agonist  Continue montelukast      Acquired hypothyroidism- (present on admission)  Assessment & Plan  Continue replacement  Last TSH was 48.61-month ago, this will need to be checked in the next 2 to 4 weeks       VTE prophylaxis: enoxaparin.

## 2021-03-11 NOTE — THERAPY
"Speech Language Pathology   Initial Assessment     Patient Name: Norm Prabhakar  AGE:  40 y.o., SEX:  male  Medical Record #: [id number]  Today's Date: 3/11/2021     Precautions  Precautions: Fall Risk, Swallow Precautions ( See Comments)  Comments: Hx of conversion disorder; R sided weakness    Assessment    40 y.o. male who presented 3/9/2021 with Rt sided weakness, difficulty speaking, and tingling x1hr. PMHx includes diabetes, hypertension, seizure disorder, PTSD, depression, migraine, hypothyroidism, dyslipidemia, chronic pain. Pt has had several hospital visits for R sided weakness in the past. He was seen by SLP for a swallow evaluation in Jan 2021 and for speech dysfluency in October 2020.     MRI Brain 3/11: \"Diffuse supratentorial white matter T2 hyperintensity. There is also mild increased T2 signal intensity in the bilateral cerebellar hemisphere. This finding is unchanged since the previous MRI dated 12/1/2014. This is nonspecific finding and likely represent severe chronic white matter leukoencephalopathy.\"    Cognitive-linguistic evaluation was completed with portions of the Cognistat and the CLQT Clock Drawing with scores as follows: Average range for Orientation, Comprehension, Naming, Memory, Calculations, Similarities and Judgement; Moderate for Attention (digit repetition) and Mild-Moderate for Repetition (sentences). Clock drawing was 13/13 = WNL. Pt was A&Ox4. He was able to repeat up to 4 digits forward, which is consistent w/ his previous performance in October 2020. Sentence repetition was negatively impacted by baseline speech dysfluency, which pt reports is worse now. Pt has assistance w/ medication management and other IADLs in his group home. 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     Plan    Recommend Speech Therapy for Evaluation only for the following " treatments:  Expression Training, Cognitive-Linguistic Training and Patient / Family / Caregiver Education.    Discharge Recommendations: Recommend home health for continued speech therapy services    Subjective    Pt was agreeable to SLP POC.     Objective       03/11/21 1001   Verbal Expression   Vocal Quality Clear   Verbal Output Functional Supervision (5)   Repetition: Sentences Moderate (3)  (pt able to repeat short sentences w/ extra time d/t dysfluen)   Word Finding Deficits Within Functional Limits (6-7)   Comments hx fluency disorder   Auditory Comprehension   Auditory Comprehension (WDL) WDL   Reading Comprehension   Reading Comprehension (WDL) WDL   Written Expression   Written Expression (WDL) WDL   Dominant Hand Left   Cognitive-Linguistic   Level of Consciousness Alert   Orientation Level Oriented x 4   Sustained Attention Minimal (4)

## 2021-03-11 NOTE — THERAPY
Occupational Therapy  Daily Treatment     Patient Name: Norm Prabhakar  Age:  40 y.o., Sex:  male  Medical Record #: [id number]  Today's Date: 3/11/2021     Precautions  Precautions: Fall Risk, Swallow Precautions ( See Comments)  Comments: Hx of conversion disorder; R sided weakness    Assessment    Pt seen for OT treatment today. He presented w/ decreased activity tolerance, and decreased fxn of R UE/LE that was variable. The patient reported that he did not sleep well last night due to his IV beeping, so he was tired. He was able to hold onto walker w/ R UE and propped on his right elbow to assist w/ bed mobility from laying to sitting EOB. He required assistance w/ recalling martinez dressing technique for LB and only required min A due to pulling the R side of his pants up a little bit. The patient would still benefit from post acute placement after hospital stay.     Plan    Continue current treatment plan.    DC Equipment Recommendations: Unable to determine at this time  Discharge Recommendations: Recommend post-acute placement for additional occupational therapy services prior to discharge home    Subjective    Pleasant and cooperative     Objective       03/11/21 0919   Precautions   Precautions Fall Risk;Swallow Precautions ( See Comments)   Comments Hx of conversion disorder; R sided weakness   Pain 0 - 10 Group   Therapist Pain Assessment 0;Nurse Notified   Cognition    Cognition / Consciousness X   Speech/ Communication Delayed Responses   Level of Consciousness Alert   Active ROM Upper Body   Active ROM Upper Body  WDL   Dominant Hand Right   Comments variable use of R UE; able to hold onto walker, and used R arm to assist in sitting up; otherwise reported that he was unable to move his R UE   Strength Upper Body   Upper Body Strength  X   Comments variable use of R UE; able to hold onto walker, and used R arm to assist in sitting up; otherwise reported that he was unable to move his R UE   Other  Treatments   Other Treatments Provided Educated on hemidressing for LB   Balance   Sitting Balance (Static) Fair +   Sitting Balance (Dynamic) Fair +   Standing Balance (Static) Fair -   Standing Balance (Dynamic) Poor +   Weight Shift Sitting Fair   Weight Shift Standing Poor   Skilled Intervention Verbal Cuing;Facilitation;Sequencing   Comments w/ FWW; reported that he did not sleep well last night due to alarms beeping   Bed Mobility    Supine to Sit Supervised   Sit to Supine Minimal Assist  (for LE; able to use L LE to hook R LE)   Scooting Supervised   Skilled Intervention Verbal Cuing;Sequencing;Facilitation;Tactile Cuing   Comments Min A to fully lift LE onto bed; able to hook R LE w/ L LE and lift most of the way   Activities of Daily Living   Lower Body Dressing Minimal Assist  (min A to pull up fully on R side w/ hand over hand)   Skilled Intervention Verbal Cuing;Tactile Cuing;Sequencing;Compensatory Strategies;Facilitation   Comments very minimal assistance required for pants other than direction for martinez dressing and pulling R side up slightly   Functional Mobility   Sit to Stand Moderate Assist   Mobility EOB only   Comments w/ FWW   Visual Perception   Visual Perception  X   Comments L sided blindness   Activity Tolerance   Sitting Edge of Bed ~10 min   Standing <5   Comments w/ FWW; decreased activity tolerance, also reported not sleeping well last night   Patient / Family Goals   Patient / Family Goal #1 to go home   Goal #1 Outcome Progressing as expected   Short Term Goals   Short Term Goal # 1 Pt will perform toilet txf w/ supv   Goal Outcome # 1 Progressing as expected   Short Term Goal # 2 Pt will perform LB dressing w/ supv   Goal Outcome # 2 Progressing as expected   Short Term Goal # 3 Pt will perform UB dressing w/ supv   Goal Outcome # 3 Goal not met   Short Term Goal # 4 Pt will groom in stance w/ supv   Goal Outcome # 4 Progressing as expected   Interdisciplinary Plan of Care  Collaboration   IDT Collaboration with  Nursing;Speech Therapist   Patient Position at End of Therapy In Bed;Bed Alarm On;Call Light within Reach   Collaboration Comments Report given to RN; hand off to speech

## 2021-03-12 LAB
GLUCOSE BLD-MCNC: 107 MG/DL (ref 65–99)
GLUCOSE BLD-MCNC: 165 MG/DL (ref 65–99)
GLUCOSE BLD-MCNC: 169 MG/DL (ref 65–99)
GLUCOSE BLD-MCNC: 180 MG/DL (ref 65–99)

## 2021-03-12 PROCEDURE — 99232 SBSQ HOSP IP/OBS MODERATE 35: CPT | Performed by: PSYCHIATRY & NEUROLOGY

## 2021-03-12 PROCEDURE — 82962 GLUCOSE BLOOD TEST: CPT | Mod: 91

## 2021-03-12 PROCEDURE — A9270 NON-COVERED ITEM OR SERVICE: HCPCS | Performed by: HOSPITALIST

## 2021-03-12 PROCEDURE — 700111 HCHG RX REV CODE 636 W/ 250 OVERRIDE (IP): Performed by: HOSPITALIST

## 2021-03-12 PROCEDURE — 99233 SBSQ HOSP IP/OBS HIGH 50: CPT | Performed by: STUDENT IN AN ORGANIZED HEALTH CARE EDUCATION/TRAINING PROGRAM

## 2021-03-12 PROCEDURE — 700102 HCHG RX REV CODE 250 W/ 637 OVERRIDE(OP): Performed by: HOSPITALIST

## 2021-03-12 PROCEDURE — 97110 THERAPEUTIC EXERCISES: CPT

## 2021-03-12 PROCEDURE — 770006 HCHG ROOM/CARE - MED/SURG/GYN SEMI*

## 2021-03-12 PROCEDURE — 97530 THERAPEUTIC ACTIVITIES: CPT

## 2021-03-12 RX ADMIN — BUDESONIDE AND FORMOTEROL FUMARATE DIHYDRATE 2 PUFF: 160; 4.5 AEROSOL RESPIRATORY (INHALATION) at 04:38

## 2021-03-12 RX ADMIN — LURASIDONE HYDROCHLORIDE 20 MG: 20 TABLET, FILM COATED ORAL at 17:17

## 2021-03-12 RX ADMIN — INSULIN GLARGINE 23 UNITS: 100 INJECTION, SOLUTION SUBCUTANEOUS at 20:55

## 2021-03-12 RX ADMIN — OMEGA-3 FATTY ACIDS CAP 1000 MG 1000 MG: 1000 CAP at 17:16

## 2021-03-12 RX ADMIN — ENOXAPARIN SODIUM 40 MG: 40 INJECTION SUBCUTANEOUS at 04:38

## 2021-03-12 RX ADMIN — PRAZOSIN HYDROCHLORIDE 4 MG: 2 CAPSULE ORAL at 20:47

## 2021-03-12 RX ADMIN — METFORMIN HYDROCHLORIDE 1000 MG: 500 TABLET ORAL at 17:16

## 2021-03-12 RX ADMIN — SERTRALINE HYDROCHLORIDE 100 MG: 100 TABLET, FILM COATED ORAL at 04:38

## 2021-03-12 RX ADMIN — ASPIRIN 325 MG ORAL TABLET 325 MG: 325 PILL ORAL at 04:38

## 2021-03-12 RX ADMIN — DIVALPROEX SODIUM 500 MG: 500 TABLET, DELAYED RELEASE ORAL at 04:39

## 2021-03-12 RX ADMIN — MONTELUKAST 10 MG: 10 TABLET, FILM COATED ORAL at 20:47

## 2021-03-12 RX ADMIN — BUDESONIDE AND FORMOTEROL FUMARATE DIHYDRATE 2 PUFF: 160; 4.5 AEROSOL RESPIRATORY (INHALATION) at 17:17

## 2021-03-12 RX ADMIN — DOCUSATE SODIUM 50 MG AND SENNOSIDES 8.6 MG 2 TABLET: 8.6; 5 TABLET, FILM COATED ORAL at 04:39

## 2021-03-12 RX ADMIN — ATORVASTATIN CALCIUM 80 MG: 80 TABLET, FILM COATED ORAL at 17:17

## 2021-03-12 RX ADMIN — METFORMIN HYDROCHLORIDE 1000 MG: 500 TABLET ORAL at 09:14

## 2021-03-12 RX ADMIN — INSULIN HUMAN 2 UNITS: 100 INJECTION, SOLUTION PARENTERAL at 09:14

## 2021-03-12 RX ADMIN — OMEGA-3 FATTY ACIDS CAP 1000 MG 1000 MG: 1000 CAP at 04:39

## 2021-03-12 RX ADMIN — LEVOTHYROXINE SODIUM 200 MCG: 0.2 TABLET ORAL at 04:38

## 2021-03-12 RX ADMIN — INSULIN HUMAN 2 UNITS: 100 INJECTION, SOLUTION PARENTERAL at 20:55

## 2021-03-12 RX ADMIN — DIVALPROEX SODIUM 1500 MG: 500 TABLET, DELAYED RELEASE ORAL at 17:17

## 2021-03-12 RX ADMIN — ACETAMINOPHEN 650 MG: 325 TABLET, FILM COATED ORAL at 17:17

## 2021-03-12 RX ADMIN — INSULIN HUMAN 2 UNITS: 100 INJECTION, SOLUTION PARENTERAL at 18:12

## 2021-03-12 RX ADMIN — INSULIN GLARGINE 23 UNITS: 100 INJECTION, SOLUTION SUBCUTANEOUS at 09:14

## 2021-03-12 ASSESSMENT — COGNITIVE AND FUNCTIONAL STATUS - GENERAL
WALKING IN HOSPITAL ROOM: A LITTLE
MOVING FROM LYING ON BACK TO SITTING ON SIDE OF FLAT BED: A LITTLE
CLIMB 3 TO 5 STEPS WITH RAILING: A LITTLE
SUGGESTED CMS G CODE MODIFIER MOBILITY: CK
STANDING UP FROM CHAIR USING ARMS: A LITTLE
MOBILITY SCORE: 19
MOVING TO AND FROM BED TO CHAIR: A LITTLE

## 2021-03-12 ASSESSMENT — ENCOUNTER SYMPTOMS
DIAPHORESIS: 0
FEVER: 0
HEADACHES: 0
EYE REDNESS: 0
SPEECH CHANGE: 1
ABDOMINAL PAIN: 0
SORE THROAT: 0
VOMITING: 0
WHEEZING: 0
NECK PAIN: 0
CHILLS: 0
NERVOUS/ANXIOUS: 0
SPUTUM PRODUCTION: 0
PALPITATIONS: 0
DEPRESSION: 0
EYE DISCHARGE: 0
COUGH: 0
NAUSEA: 0
DIZZINESS: 0
FOCAL WEAKNESS: 1
SHORTNESS OF BREATH: 0
BACK PAIN: 1
MYALGIAS: 0
SINUS PAIN: 0
SENSORY CHANGE: 1
WEAKNESS: 0

## 2021-03-12 ASSESSMENT — GAIT ASSESSMENTS
DISTANCE (FEET): 3
ASSISTIVE DEVICE: FRONT WHEEL WALKER
GAIT LEVEL OF ASSIST: MINIMAL ASSIST

## 2021-03-12 ASSESSMENT — PAIN DESCRIPTION - PAIN TYPE
TYPE: ACUTE PAIN
TYPE: ACUTE PAIN

## 2021-03-12 NOTE — CARE PLAN
Problem: Communication  Goal: The ability to communicate needs accurately and effectively will improve  3/12/2021 0835 by Sayra Weaver R.N.  Outcome: PROGRESSING AS EXPECTED  3/12/2021 0832 by Sayra Weaver R.N.  Outcome: PROGRESSING AS EXPECTED     Problem: Safety  Goal: Will remain free from injury  Outcome: PROGRESSING AS EXPECTED  Goal: Will remain free from falls  Outcome: PROGRESSING AS EXPECTED     Problem: Infection  Goal: Will remain free from infection  Outcome: PROGRESSING AS EXPECTED     Problem: Venous Thromboembolism (VTW)/Deep Vein Thrombosis (DVT) Prevention:  Goal: Patient will participate in Venous Thrombosis (VTE)/Deep Vein Thrombosis (DVT)Prevention Measures  Outcome: PROGRESSING AS EXPECTED     Problem: Bowel/Gastric:  Goal: Normal bowel function is maintained or improved  Outcome: PROGRESSING AS EXPECTED  Goal: Will not experience complications related to bowel motility  Outcome: PROGRESSING AS EXPECTED     Problem: Knowledge Deficit  Goal: Knowledge of disease process/condition, treatment plan, diagnostic tests, and medications will improve  Outcome: PROGRESSING AS EXPECTED  Goal: Knowledge of the prescribed therapeutic regimen will improve  Outcome: PROGRESSING AS EXPECTED

## 2021-03-12 NOTE — PROGRESS NOTES
[facility] Medicine Daily Progress Note    Date of Service  3/12/2021    Chief Complaint  40 y.o. male admitted 3/9/2021 with right-sided weakness, difficulty speaking.    Interval Problem Update  No acute complaints this morning.  Denies headache, chest pain, abdominal pain, shortness of breath.  Psychiatry to see patient today.    Consultants/Specialty  Neurology.  Psychiatry.    Code Status  Full Code    Disposition  Neurology unit.    Review of Systems  Review of Systems   Constitutional: Negative for chills, diaphoresis and fever.   HENT: Negative for congestion, hearing loss, sinus pain and sore throat.    Eyes: Negative for discharge and redness.        Positive for blindness in left eye, chronic.   Respiratory: Negative for cough, sputum production, shortness of breath and wheezing.    Cardiovascular: Negative for chest pain and palpitations.   Gastrointestinal: Negative for abdominal pain, nausea and vomiting.   Genitourinary: Negative for dysuria and urgency.   Musculoskeletal: Positive for back pain (Chronic). Negative for myalgias and neck pain.   Skin: Negative for itching.   Neurological: Positive for sensory change, speech change and focal weakness. Negative for dizziness, weakness and headaches.   Endo/Heme/Allergies: Negative for environmental allergies.   Psychiatric/Behavioral: Negative for depression. The patient is not nervous/anxious.              Physical Exam  Temp:  [36.2 °C (97.1 °F)-36.8 °C (98.2 °F)] 36.2 °C (97.2 °F)  Pulse:  [52-70] 52  Resp:  [18-19] 18  BP: (100-116)/(48-66) 113/65  SpO2:  [93 %-98 %] 93 %    Physical Exam  Vitals and nursing note reviewed.   Constitutional:       General: He is not in acute distress.     Appearance: He is obese. He is not toxic-appearing or diaphoretic.   HENT:      Head: Normocephalic and atraumatic.      Nose: No congestion.      Mouth/Throat:      Mouth: Mucous membranes are moist.      Pharynx: Oropharynx is clear.   Eyes:      General: No scleral  icterus.     Conjunctiva/sclera: Conjunctivae normal.   Cardiovascular:      Rate and Rhythm: Normal rate and regular rhythm.      Pulses: Normal pulses.      Heart sounds: Normal heart sounds. No murmur.   Pulmonary:      Effort: Pulmonary effort is normal. No respiratory distress.      Breath sounds: Normal breath sounds. No stridor. No wheezing.   Abdominal:      General: Bowel sounds are normal. There is no distension.      Palpations: Abdomen is soft.      Tenderness: There is no abdominal tenderness. There is no guarding or rebound.   Musculoskeletal:         General: No swelling or tenderness.   Skin:     Capillary Refill: Capillary refill takes less than 2 seconds.      Coloration: Skin is not jaundiced.   Neurological:      Mental Status: He is alert and oriented to person, place, and time. Mental status is at baseline.      Comments: Speech is dysarthric with occasional word finding difficulties though improving.  2 through 12 intact with exception of left eye (chronic blindness).  Strength left side 5 out of 5.  Unchanged.  Right side patient with 1-2 out of 5 strength strength on strength testing.  Again, when I lift his right arm and test for tone and that goes his arm, he controls it on its way down.   Psychiatric:         Mood and Affect: Mood normal.         Behavior: Behavior normal.         Fluids    Intake/Output Summary (Last 24 hours) at 3/12/2021 1015  Last data filed at 3/12/2021 0648  Gross per 24 hour   Intake 350 ml   Output 3100 ml   Net -2750 ml       Laboratory  Recent Labs     03/09/21  1651 03/11/21  0336   WBC 8.9 10.0   RBC 4.16* 4.50*   HEMOGLOBIN 11.7* 12.7*   HEMATOCRIT 36.4* 39.7*   MCV 87.5 88.2   MCH 28.1 28.2   MCHC 32.1* 32.0*   RDW 44.3 45.2   PLATELETCT 289 303   MPV 10.6 10.1     Recent Labs     03/09/21  1651 03/10/21  0348 03/11/21  0336   SODIUM 137 137 136   POTASSIUM 3.6 3.8 3.9   CHLORIDE 106 103 100   CO2 18* 20 21   GLUCOSE 209* 164* 114*   BUN 25* 24* 22    CREATININE 0.88 0.92 0.97   CALCIUM 8.4* 8.5 9.1     Recent Labs     03/09/21  1651   APTT 26.5   INR 0.99         Recent Labs     03/10/21  0348   TRIGLYCERIDE 636*   HDL 29*   LDL see below       Imaging  MR-BRAIN-W/O   Final Result      1.  Diffuse supratentorial white matter T2 hyperintensity. There is also mild increased T2 signal intensity in the bilateral cerebellar hemisphere. This finding is unchanged since the previous MRI dated 12/1/2014. This is nonspecific finding and likely    represent severe chronic white matter leukoencephalopathy.   2.  Moderate cerebral and cerebellar volume loss.   3.  There has been no significant interval change since the 2/6/2021      DX-CHEST-PORTABLE (1 VIEW)   Final Result      No acute cardiac or pulmonary abnormality is noted.      CT-CTA NECK WITH & W/O-POST PROCESSING   Final Result      1.  CT angiogram of the neck within normal limits.      2.  Hypervascular masses anterior to the thyroid and cricoid cartilage again noted.      CT-CTA HEAD WITH & W/O-POST PROCESS   Final Result      CT angiogram of the [place] within normal limits.      CT-CEREBRAL PERFUSION ANALYSIS   Final Result      1.  Cerebral blood flow less than 30% likely representing completed infarct = 0 mL.      2.  T Max more than 6 seconds likely representing combination of completed infarct and ischemia = 0 mL.      3.  Mismatched volume likely representing ischemic brain/penumbra = None      4.  Please note that the cerebral perfusion was performed on the limited brain tissue around the basal ganglia region. Infarct/ischemia outside the CT perfusion sections can be missed in this study.      CT-HEAD W/O   Final Result      1.  No acute intracranial process.      2.  Unchanged diffuse small vessel ischemic change.           Assessment/Plan  Right hemiparesis (HCC)- (present on admission)  Assessment & Plan  - Etiology is elusive.  Patient does have a history of migraines and seizures and  "certainly these could be atypical presentations of these processes.  MRI brain showed \"Diffuse supratentorial white matter T2 hyperintensity. There is also mild increased T2 signal intensity in the bilateral cerebellar hemisphere. This finding is unchanged since the previous MRI dated 12/1/2014. This is nonspecific finding and likely represent severe chronic white matter leukoencephalopathy.  Moderate cerebral and cerebellar volume loss.  There has been no significant interval change since the 2/6/2021.\"  - Additionally he could have some contribution of psychogenic etiology as his physical exam is somewhat inconsistent.  Psychiatry consulting.    - PT/OT recommends post acute placement.   - Aspirin and high-dose statin.     Seizure disorder (HCC)- (present on admission)  Assessment & Plan  Patient is maintained on Topamax and Depakote, we will continue his baseline doses.  Neurology has been consulted    HTN (hypertension)- (present on admission)  Assessment & Plan  Patient is maintained on prazosin, lisinopril as an outpatient.  Continue to doubt this is an acute ischemic event; continue his home medications monitor and titrate as appropriate    HLD (hyperlipidemia)- (present on admission)  Assessment & Plan  Continue high-dose statin    Type 2 diabetes mellitus without complication, without long-term current use of insulin (HCC)- (present on admission)  Assessment & Plan  Patient is maintained on Metformin, empagliflozin, and insulin Lantus 23 units twice daily.  We will continue his baseline therapy regimen with sliding scale coverage beyond that.  His last A1c was 9.0 and that was 1 month ago.    PTSD (post-traumatic stress disorder)- (present on admission)  Assessment & Plan  Continue home psychiatric regimen    Asthma- (present on admission)  Assessment & Plan  Placed on O2 and RT protocols  Continue inhaled steroid and long-acting beta agonist  Continue montelukast      Acquired hypothyroidism- (present on " admission)  Assessment & Plan  Continue replacement  Last TSH was 48.61-month ago, this will need to be checked in the next 2 to 4 weeks       VTE prophylaxis: enoxaparin.

## 2021-03-12 NOTE — DISCHARGE PLANNING
Follow up for post acute services MRI negative for an acute event anticipate skilled nursing versus outpatient follow up when medically cleared. 02/08 Physiatry consult  the recommendation for skilled versus return to group home.

## 2021-03-12 NOTE — PROGRESS NOTES
[facility] Medicine Daily Progress Note    Date of Service  3/11/2021    Chief Complaint  40 y.o. male admitted 3/9/2021 with right-sided weakness, difficulty speaking.    Interval Problem Update  No acute complaints.  Denies chest pain, abd pain, SOB.  Denies headache this morning.     Consultants/Specialty  Neurology.    Code Status  Full Code    Disposition  Neurology unit.    Review of Systems  Review of Systems   Constitutional: Negative for chills, diaphoresis and fever.   HENT: Negative for congestion, hearing loss, sinus pain and sore throat.    Eyes: Negative for discharge and redness.        Positive for blindness in left eye, chronic.   Respiratory: Negative for cough, sputum production, shortness of breath and wheezing.    Cardiovascular: Negative for chest pain and palpitations.   Gastrointestinal: Negative for abdominal pain, nausea and vomiting.   Genitourinary: Negative for dysuria and urgency.   Musculoskeletal: Positive for back pain (Chronic). Negative for myalgias and neck pain.   Skin: Negative for itching.   Neurological: Positive for sensory change, speech change and focal weakness. Negative for dizziness, weakness and headaches.   Endo/Heme/Allergies: Negative for environmental allergies.   Psychiatric/Behavioral: Negative for depression. The patient is not nervous/anxious.              Physical Exam  Temp:  [36.1 °C (97 °F)-37.1 °C (98.7 °F)] 36.3 °C (97.4 °F)  Pulse:  [46-78] 60  Resp:  [17-20] 18  BP: (101-119)/(58-75) 116/66  SpO2:  [94 %-98 %] 94 %    Physical Exam  Vitals and nursing note reviewed.   Constitutional:       General: He is not in acute distress.     Appearance: He is obese. He is not toxic-appearing or diaphoretic.   HENT:      Head: Normocephalic and atraumatic.      Nose: No congestion.      Mouth/Throat:      Mouth: Mucous membranes are moist.      Pharynx: Oropharynx is clear.   Eyes:      General: No scleral icterus.     Conjunctiva/sclera: Conjunctivae normal.    Cardiovascular:      Rate and Rhythm: Normal rate and regular rhythm.      Pulses: Normal pulses.      Heart sounds: Normal heart sounds. No murmur.   Pulmonary:      Effort: Pulmonary effort is normal. No respiratory distress.      Breath sounds: Normal breath sounds. No stridor. No wheezing.   Abdominal:      General: Bowel sounds are normal. There is no distension.      Palpations: Abdomen is soft.      Tenderness: There is no abdominal tenderness. There is no guarding or rebound.   Musculoskeletal:         General: No swelling or tenderness.   Skin:     Capillary Refill: Capillary refill takes less than 2 seconds.      Coloration: Skin is not jaundiced.   Neurological:      Mental Status: He is alert and oriented to person, place, and time. Mental status is at baseline.      Comments: Speech is dysarthric with occasional word finding difficulties did  2 through 12 intact with exception of left eye (chronic blindness).  Strength left side 5 out of 5.  Right side patient with 0-5 strength on strength testing.  Ever, when I lift his right arm and test for tone and that goes his arm, he controls it on its way down.   Psychiatric:         Mood and Affect: Mood normal.         Behavior: Behavior normal.         Fluids    Intake/Output Summary (Last 24 hours) at 3/11/2021 1811  Last data filed at 3/11/2021 1534  Gross per 24 hour   Intake 590 ml   Output 2400 ml   Net -1810 ml       Laboratory  Recent Labs     03/09/21  1651 03/11/21  0336   WBC 8.9 10.0   RBC 4.16* 4.50*   HEMOGLOBIN 11.7* 12.7*   HEMATOCRIT 36.4* 39.7*   MCV 87.5 88.2   MCH 28.1 28.2   MCHC 32.1* 32.0*   RDW 44.3 45.2   PLATELETCT 289 303   MPV 10.6 10.1     Recent Labs     03/09/21  1651 03/10/21  0348 03/11/21  0336   SODIUM 137 137 136   POTASSIUM 3.6 3.8 3.9   CHLORIDE 106 103 100   CO2 18* 20 21   GLUCOSE 209* 164* 114*   BUN 25* 24* 22   CREATININE 0.88 0.92 0.97   CALCIUM 8.4* 8.5 9.1     Recent Labs     03/09/21 1651   APTT 26.5   INR 0.99          Recent Labs     03/10/21  0348   TRIGLYCERIDE 636*   HDL 29*   LDL see below       Imaging  MR-BRAIN-W/O   Final Result      1.  Diffuse supratentorial white matter T2 hyperintensity. There is also mild increased T2 signal intensity in the bilateral cerebellar hemisphere. This finding is unchanged since the previous MRI dated 12/1/2014. This is nonspecific finding and likely    represent severe chronic white matter leukoencephalopathy.   2.  Moderate cerebral and cerebellar volume loss.   3.  There has been no significant interval change since the 2/6/2021      DX-CHEST-PORTABLE (1 VIEW)   Final Result      No acute cardiac or pulmonary abnormality is noted.      CT-CTA NECK WITH & W/O-POST PROCESSING   Final Result      1.  CT angiogram of the neck within normal limits.      2.  Hypervascular masses anterior to the thyroid and cricoid cartilage again noted.      CT-CTA HEAD WITH & W/O-POST PROCESS   Final Result      CT angiogram of the [place] within normal limits.      CT-CEREBRAL PERFUSION ANALYSIS   Final Result      1.  Cerebral blood flow less than 30% likely representing completed infarct = 0 mL.      2.  T Max more than 6 seconds likely representing combination of completed infarct and ischemia = 0 mL.      3.  Mismatched volume likely representing ischemic brain/penumbra = None      4.  Please note that the cerebral perfusion was performed on the limited brain tissue around the basal ganglia region. Infarct/ischemia outside the CT perfusion sections can be missed in this study.      CT-HEAD W/O   Final Result      1.  No acute intracranial process.      2.  Unchanged diffuse small vessel ischemic change.           Assessment/Plan  Right hemiparesis (HCC)- (present on admission)  Assessment & Plan  Etiology is elusive.  Patient does have a history of migraines and seizures and certainly these could be atypical presentations of these processes.  Additionally he could still have an ischemic  event though no evidence has been seen on the CT.  We will check an MRI to rule this out.  Additionally he could have some contribution of psychogenic etiology as his physical exam is somewhat inconsistent.  MRI brain; results pending.  Neurology has been consulted  PT OT and speech consultations  Aspirin and high-dose statin pending MRI brain  We will defer further stroke work-up unless the MRI indicates an acute ischemic event.  Agree with the emergency room physician and neurology that this patient is not a TPA candidate.  I think this medication would carry a greater risk than benefit for him    Seizure disorder (HCC)- (present on admission)  Assessment & Plan  Patient is maintained on Topamax and Depakote, we will continue his baseline doses.  Neurology has been consulted    HTN (hypertension)- (present on admission)  Assessment & Plan  Patient is maintained on prazosin, lisinopril as an outpatient.  Continue to doubt this is an acute ischemic event; continue his home medications monitor and titrate as appropriate    HLD (hyperlipidemia)- (present on admission)  Assessment & Plan  Continue high-dose statin    Type 2 diabetes mellitus without complication, without long-term current use of insulin (HCC)- (present on admission)  Assessment & Plan  Patient is maintained on Metformin, empagliflozin, and insulin Lantus 23 units twice daily.  We will continue his baseline therapy regimen with sliding scale coverage beyond that.  His last A1c was 9.0 and that was 1 month ago.    PTSD (post-traumatic stress disorder)- (present on admission)  Assessment & Plan  Continue home psychiatric regimen    Asthma- (present on admission)  Assessment & Plan  Placed on O2 and RT protocols  Continue inhaled steroid and long-acting beta agonist  Continue montelukast      Acquired hypothyroidism- (present on admission)  Assessment & Plan  Continue replacement  Last TSH was 48.61-month ago, this will need to be checked in the next 2 to  4 weeks       VTE prophylaxis: enoxaparin.

## 2021-03-13 LAB
ANION GAP SERPL CALC-SCNC: 15 MMOL/L (ref 7–16)
BASOPHILS # BLD AUTO: 0.7 % (ref 0–1.8)
BASOPHILS # BLD: 0.07 K/UL (ref 0–0.12)
BUN SERPL-MCNC: 24 MG/DL (ref 8–22)
CALCIUM SERPL-MCNC: 9.4 MG/DL (ref 8.5–10.5)
CHLORIDE SERPL-SCNC: 98 MMOL/L (ref 96–112)
CO2 SERPL-SCNC: 23 MMOL/L (ref 20–33)
CREAT SERPL-MCNC: 1.07 MG/DL (ref 0.5–1.4)
EOSINOPHIL # BLD AUTO: 0.14 K/UL (ref 0–0.51)
EOSINOPHIL NFR BLD: 1.5 % (ref 0–6.9)
ERYTHROCYTE [DISTWIDTH] IN BLOOD BY AUTOMATED COUNT: 43.5 FL (ref 35.9–50)
GLUCOSE BLD-MCNC: 108 MG/DL (ref 65–99)
GLUCOSE BLD-MCNC: 138 MG/DL (ref 65–99)
GLUCOSE BLD-MCNC: 146 MG/DL (ref 65–99)
GLUCOSE BLD-MCNC: 152 MG/DL (ref 65–99)
GLUCOSE SERPL-MCNC: 170 MG/DL (ref 65–99)
HCT VFR BLD AUTO: 39.1 % (ref 42–52)
HGB BLD-MCNC: 12.7 G/DL (ref 14–18)
IMM GRANULOCYTES # BLD AUTO: 0.15 K/UL (ref 0–0.11)
IMM GRANULOCYTES NFR BLD AUTO: 1.6 % (ref 0–0.9)
LYMPHOCYTES # BLD AUTO: 3.96 K/UL (ref 1–4.8)
LYMPHOCYTES NFR BLD: 41.6 % (ref 22–41)
MCH RBC QN AUTO: 28 PG (ref 27–33)
MCHC RBC AUTO-ENTMCNC: 32.5 G/DL (ref 33.7–35.3)
MCV RBC AUTO: 86.3 FL (ref 81.4–97.8)
MONOCYTES # BLD AUTO: 0.81 K/UL (ref 0–0.85)
MONOCYTES NFR BLD AUTO: 8.5 % (ref 0–13.4)
NEUTROPHILS # BLD AUTO: 4.39 K/UL (ref 1.82–7.42)
NEUTROPHILS NFR BLD: 46.1 % (ref 44–72)
NRBC # BLD AUTO: 0 K/UL
NRBC BLD-RTO: 0 /100 WBC
PLATELET # BLD AUTO: 288 K/UL (ref 164–446)
PMV BLD AUTO: 9.9 FL (ref 9–12.9)
POTASSIUM SERPL-SCNC: 3.7 MMOL/L (ref 3.6–5.5)
RBC # BLD AUTO: 4.53 M/UL (ref 4.7–6.1)
SODIUM SERPL-SCNC: 136 MMOL/L (ref 135–145)
WBC # BLD AUTO: 9.5 K/UL (ref 4.8–10.8)

## 2021-03-13 PROCEDURE — 770006 HCHG ROOM/CARE - MED/SURG/GYN SEMI*

## 2021-03-13 PROCEDURE — 700102 HCHG RX REV CODE 250 W/ 637 OVERRIDE(OP): Performed by: HOSPITALIST

## 2021-03-13 PROCEDURE — 85025 COMPLETE CBC W/AUTO DIFF WBC: CPT

## 2021-03-13 PROCEDURE — 700111 HCHG RX REV CODE 636 W/ 250 OVERRIDE (IP): Performed by: HOSPITALIST

## 2021-03-13 PROCEDURE — 80048 BASIC METABOLIC PNL TOTAL CA: CPT

## 2021-03-13 PROCEDURE — 36415 COLL VENOUS BLD VENIPUNCTURE: CPT

## 2021-03-13 PROCEDURE — A9270 NON-COVERED ITEM OR SERVICE: HCPCS | Performed by: HOSPITALIST

## 2021-03-13 PROCEDURE — 99232 SBSQ HOSP IP/OBS MODERATE 35: CPT | Performed by: STUDENT IN AN ORGANIZED HEALTH CARE EDUCATION/TRAINING PROGRAM

## 2021-03-13 PROCEDURE — 82962 GLUCOSE BLOOD TEST: CPT

## 2021-03-13 RX ADMIN — METFORMIN HYDROCHLORIDE 1000 MG: 500 TABLET ORAL at 17:20

## 2021-03-13 RX ADMIN — LURASIDONE HYDROCHLORIDE 20 MG: 20 TABLET, FILM COATED ORAL at 17:23

## 2021-03-13 RX ADMIN — OMEGA-3 FATTY ACIDS CAP 1000 MG 1000 MG: 1000 CAP at 04:26

## 2021-03-13 RX ADMIN — ENOXAPARIN SODIUM 40 MG: 40 INJECTION SUBCUTANEOUS at 04:25

## 2021-03-13 RX ADMIN — PRAZOSIN HYDROCHLORIDE 4 MG: 2 CAPSULE ORAL at 20:45

## 2021-03-13 RX ADMIN — METFORMIN HYDROCHLORIDE 1000 MG: 500 TABLET ORAL at 09:42

## 2021-03-13 RX ADMIN — DIVALPROEX SODIUM 500 MG: 500 TABLET, DELAYED RELEASE ORAL at 04:26

## 2021-03-13 RX ADMIN — ACETAMINOPHEN 650 MG: 325 TABLET, FILM COATED ORAL at 20:56

## 2021-03-13 RX ADMIN — DIVALPROEX SODIUM 1500 MG: 500 TABLET, DELAYED RELEASE ORAL at 17:20

## 2021-03-13 RX ADMIN — BUDESONIDE AND FORMOTEROL FUMARATE DIHYDRATE 2 PUFF: 160; 4.5 AEROSOL RESPIRATORY (INHALATION) at 17:23

## 2021-03-13 RX ADMIN — OMEGA-3 FATTY ACIDS CAP 1000 MG 1000 MG: 1000 CAP at 17:20

## 2021-03-13 RX ADMIN — INSULIN HUMAN 2 UNITS: 100 INJECTION, SOLUTION PARENTERAL at 20:50

## 2021-03-13 RX ADMIN — SERTRALINE HYDROCHLORIDE 100 MG: 100 TABLET, FILM COATED ORAL at 04:26

## 2021-03-13 RX ADMIN — LEVOTHYROXINE SODIUM 200 MCG: 0.2 TABLET ORAL at 04:26

## 2021-03-13 RX ADMIN — INSULIN GLARGINE 23 UNITS: 100 INJECTION, SOLUTION SUBCUTANEOUS at 20:49

## 2021-03-13 RX ADMIN — MONTELUKAST 10 MG: 10 TABLET, FILM COATED ORAL at 20:45

## 2021-03-13 RX ADMIN — ATORVASTATIN CALCIUM 80 MG: 80 TABLET, FILM COATED ORAL at 17:20

## 2021-03-13 RX ADMIN — INSULIN GLARGINE 23 UNITS: 100 INJECTION, SOLUTION SUBCUTANEOUS at 09:42

## 2021-03-13 RX ADMIN — ASPIRIN 325 MG ORAL TABLET 325 MG: 325 PILL ORAL at 04:26

## 2021-03-13 ASSESSMENT — ENCOUNTER SYMPTOMS
HEADACHES: 0
COUGH: 0
SHORTNESS OF BREATH: 0
EYE REDNESS: 0
SPUTUM PRODUCTION: 0
NERVOUS/ANXIOUS: 0
FOCAL WEAKNESS: 1
FEVER: 0
WHEEZING: 0
SENSORY CHANGE: 1
VOMITING: 0
DEPRESSION: 0
CHILLS: 0
SORE THROAT: 0
NAUSEA: 0
WEAKNESS: 0
EYE DISCHARGE: 0
SPEECH CHANGE: 1
SINUS PAIN: 0
PALPITATIONS: 0
DIZZINESS: 0
DIAPHORESIS: 0
NECK PAIN: 0
MYALGIAS: 0
ABDOMINAL PAIN: 0
BACK PAIN: 1

## 2021-03-13 ASSESSMENT — PAIN DESCRIPTION - PAIN TYPE
TYPE: ACUTE PAIN

## 2021-03-13 ASSESSMENT — FIBROSIS 4 INDEX: FIB4 SCORE: 0.65

## 2021-03-13 NOTE — CARE PLAN
Problem: Knowledge Deficit  Goal: Knowledge of disease process/condition, treatment plan, diagnostic tests, and medications will improve  Outcome: PROGRESSING AS EXPECTED  Note: Updated pt on POC. Encouraged pt to voice feelings. Answered all questions.      Problem: Skin Integrity  Goal: Risk for impaired skin integrity will decrease  Outcome: PROGRESSING AS EXPECTED  Note: Pt able to move and turn himself in bed. Encouraging pt to move and reposition frequently.

## 2021-03-13 NOTE — THERAPY
Physical Therapy   Daily Treatment     Patient Name: Norm Prabhakar  Age:  40 y.o., Sex:  male  Medical Record #: [id number]  Today's Date: 3/12/2021     Precautions: Fall Risk    Assessment    Patient seen for PT treatment.  Patient able to perform sit to stand with SPV and FWW but requires min assist for transfer from EOB to chair.  Patient continues to demonstrate good functional strength.  Patient performed seated exercises including long arc quads, ankle pumps, and marches.  Patient demonstrates RLE weakness with exercises inconsistent with functional mobility.  Patient requiring min assist for RLE during all exercises.  Will continue to follow.      Plan    Continue current treatment plan.    DC Equipment Recommendations: Front-Wheel Walker  Discharge Recommendations: Recommend post-acute placement for additional physical therapy services prior to discharge home     Objective     03/12/21 1514   Cognition    Cognition / Consciousness X   Level of Consciousness Alert   Initiation Impaired   Comments Inconsistent movement of R LE   Sitting Lower Body Exercises   Sitting Lower Body Exercises Yes   Ankle Pumps 2 sets of 10 (assist for R ankle)   Long Arc Quad 1 set of 10   Marching (1 set of 5, L only)   Balance   Sitting Balance (Static) Fair +   Sitting Balance (Dynamic) Fair +   Standing Balance (Static) Fair   Standing Balance (Dynamic) Fair -   Weight Shift Sitting Fair   Weight Shift Standing Poor   Skilled Intervention Verbal Cuing   Comments w/ FWW   Gait Analysis   Gait Level Of Assist Minimal Assist   Assistive Device Front Wheel Walker (bariatric)   Distance (Feet) 3   # of Times Distance was Traveled 1   Weight Bearing Status No restrictions   Skilled Intervention Verbal Cuing   Comments stepping from EOB to chair   Bed Mobility    Supine to Sit Supervised   Sit to Supine (Up in chair post session)   Scooting Supervised   Skilled Intervention Verbal Cuing   Comments cues to hook RLE with LLE    Functional Mobility   Sit to Stand Supervised   Bed, Chair, Wheelchair Transfer Minimal Assist   Transfer Method Stand Step   Mobility EOB > chair, seated exercise   Skilled Intervention Verbal Cuing   Short Term Goals    Short Term Goal # 1 Patient will be able to demonstrate bed mobility SPV in 6 visits in order to [facility] ind   Goal Outcome # 1 Progressing as expected   Short Term Goal #2 Patient will be able to perform transfers SPV in 6 visits in order to [facility] ind   Goal Outcome #3 Goal not met   Short Term Goal # 3 Patient will be able to perform amb x150' SPV in 6 visits in order to [facility] ind   Goal Outcome # 3 Goal not met

## 2021-03-13 NOTE — PROGRESS NOTES
Assumed care of patient. Neuro intact, no changes from charted assessment. Patient alert and oriented.

## 2021-03-13 NOTE — CONSULTS
"PSYCHIATRIC FOLLOW-UP:(established) per CMS  *Reason for admission:     BIB EMS for Rt sided weakness, difficulty speaking, and tingling x1hr.    Reason for consult:  Neurology for conversion disorder  *Requesting Physician:  ALDO PRETTY  *Legal Hold Status: no on hold           *HPI: pt has been seen by psych as recently as 2/3/2021: please refer to that note for details.   Pt wants to go to [facility] where his \"fiancee\" is but his payee won't give him the money. He gets board at the group home, can't use his laptop for facebook or games because their is no Internet. Feels lonely as well.  Encouraged him to come to [facility] and use their free wifi. He lives within one block. [place]. Denies hopelessness, SI/HI, anxiety, psychosis.    Home meds per pharmacy are: latuda 20 mg, zoloft 100 mg buspar 10 mg tid, depakote 500/1500 mg for sz (though can help mood), prazocin 2 mg    PPHx, SocHx, FmHx: reviewed. No changes.      Medical ROS (as pertinent):                    Says he can't feel his R leg (pt is able to move it. And does not have muscle mass loss)  This leg is \"weak\"    *Psychiatric Examination:   Vitals:   Vitals:    03/12/21 0400 03/12/21 0438 03/12/21 0718 03/12/21 1513   BP: 100/66 100/66 113/65 119/82   Pulse: (!) 56  (!) 52 (!) 57   Resp: 18  18 18   Temp: 36.4 °C (97.5 °F)  36.2 °C (97.2 °F) 36.5 °C (97.7 °F)   TempSrc: Temporal  Temporal Temporal   SpO2: 98%  93% 97%   Weight:       Height:         General Appearance:  obese and good eye contact though R eye vs L eye has a wider palpebral opening.   Abnormal Movements: none   Gait and Posture: lying in bed  Speech:  Has a stuttering like quality when starting sentences which he says happens when his R leg get numb and weak.  Thought Process: normal rate  Associations:   linear  Abnormal or Psychotic Thoughts: none  Judgement and Insight: limited  Orientation: grossly intact  Recent and Remote Memory: grossly " "intact  Attention Span and Concentration: intact  Language:fluent  Fund of Knowledge: not tested  Mood and Affect:\"board and lonely\"  SI/HI:  suicidal - no and homicidal - no  Results for HOLLY KIM (MRN [id number]) as of 3/12/2021 16:38   Ref. Range 10/16/2020 22:09 2/3/2021 13:43 2/3/2021 16:56 2/4/2021 02:42 3/9/2021 17:44   TSH Latest Ref Range: 0.380 - 5.330 uIU/mL   48.600 (H)     Free T-4 Latest Ref Range: 0.93 - 1.70 ng/dL    0.87 (L)    Results for HOLLY KIM (MRN [id number]) as of 3/12/2021 16:38   Ref. Range 3/9/2021 17:18   Glycohemoglobin Latest Ref Range: 4.0 - 5.6 % 9.8 (H)     Cranial Imaging: personally reviewed MRI: severe and chronic white matter disease  EKG:personally reviewed QTc 427      *ASSESSMENT/RECOMENDATIONS:  1. Mood disorder unspc  - to include \"lonliness and boredom\"        2. R/O intellectual limitations  -  learning disability, special education  -speech cog: 3/11: moderate problems in attn. Otherwise unremarkable but \"..Pt has assistance w/ medication management and other IADLs in his group home. 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  3. R/O Functional neurological disorder   -he is largely unreliable for various reasons    4. PTSD    5.  Medical  -as per 2/3/2021:  \"has been seen multiple times by psychiatry. He has a documented SZ disorder, likely intellectual disability and features of a conversion disorder.\"  -HTN  -DM II   -Hx of SZ disorder:depakote     -hypothyroidism  -R tingling and numbness of LE.    Legal hold: not applicable. There is nothing that psychiatry can do to help this patient.he is on adequate meds and his complaints are environmental. He has a  at [facility].and [facility] is a payee as well for him or so it appears from what he says.  can call them and perhaps they can provide more support for him. "     *Signing off  *Thank you for the consult

## 2021-03-13 NOTE — CARE PLAN
Problem: Communication  Goal: The ability to communicate needs accurately and effectively will improve  Outcome: PROGRESSING AS EXPECTED     Problem: Safety  Goal: Will remain free from injury  Outcome: PROGRESSING AS EXPECTED  Goal: Will remain free from falls  Outcome: PROGRESSING AS EXPECTED     Problem: Knowledge Deficit  Goal: Knowledge of disease process/condition, treatment plan, diagnostic tests, and medications will improve  Outcome: PROGRESSING AS EXPECTED  Goal: Knowledge of the prescribed therapeutic regimen will improve  Outcome: PROGRESSING AS EXPECTED     Problem: Discharge Barriers/Planning  Goal: Patient's continuum of care needs will be met  Outcome: PROGRESSING AS EXPECTED     Problem: Mobility  Goal: Risk for activity intolerance will decrease  Outcome: PROGRESSING AS EXPECTED     Problem: Pain Management  Goal: Pain level will decrease to patient's comfort goal  Outcome: PROGRESSING AS EXPECTED

## 2021-03-14 LAB
GLUCOSE BLD-MCNC: 129 MG/DL (ref 65–99)
GLUCOSE BLD-MCNC: 130 MG/DL (ref 65–99)
GLUCOSE BLD-MCNC: 131 MG/DL (ref 65–99)
GLUCOSE BLD-MCNC: 164 MG/DL (ref 65–99)

## 2021-03-14 PROCEDURE — 82962 GLUCOSE BLOOD TEST: CPT | Mod: 91

## 2021-03-14 PROCEDURE — 700102 HCHG RX REV CODE 250 W/ 637 OVERRIDE(OP): Performed by: STUDENT IN AN ORGANIZED HEALTH CARE EDUCATION/TRAINING PROGRAM

## 2021-03-14 PROCEDURE — 99232 SBSQ HOSP IP/OBS MODERATE 35: CPT | Performed by: STUDENT IN AN ORGANIZED HEALTH CARE EDUCATION/TRAINING PROGRAM

## 2021-03-14 PROCEDURE — 700111 HCHG RX REV CODE 636 W/ 250 OVERRIDE (IP): Performed by: HOSPITALIST

## 2021-03-14 PROCEDURE — A9270 NON-COVERED ITEM OR SERVICE: HCPCS | Performed by: HOSPITALIST

## 2021-03-14 PROCEDURE — 700102 HCHG RX REV CODE 250 W/ 637 OVERRIDE(OP): Performed by: HOSPITALIST

## 2021-03-14 PROCEDURE — 770006 HCHG ROOM/CARE - MED/SURG/GYN SEMI*

## 2021-03-14 RX ADMIN — METFORMIN HYDROCHLORIDE 1000 MG: 500 TABLET ORAL at 09:23

## 2021-03-14 RX ADMIN — INSULIN GLARGINE 23 UNITS: 100 INJECTION, SOLUTION SUBCUTANEOUS at 20:42

## 2021-03-14 RX ADMIN — SERTRALINE HYDROCHLORIDE 100 MG: 100 TABLET, FILM COATED ORAL at 04:38

## 2021-03-14 RX ADMIN — METFORMIN HYDROCHLORIDE 1000 MG: 500 TABLET ORAL at 16:58

## 2021-03-14 RX ADMIN — INSULIN HUMAN 2 UNITS: 100 INJECTION, SOLUTION PARENTERAL at 17:30

## 2021-03-14 RX ADMIN — ASPIRIN 325 MG ORAL TABLET 325 MG: 325 PILL ORAL at 04:39

## 2021-03-14 RX ADMIN — LURASIDONE HYDROCHLORIDE 20 MG: 20 TABLET, FILM COATED ORAL at 20:45

## 2021-03-14 RX ADMIN — ATORVASTATIN CALCIUM 80 MG: 80 TABLET, FILM COATED ORAL at 17:29

## 2021-03-14 RX ADMIN — LEVOTHYROXINE SODIUM 200 MCG: 0.2 TABLET ORAL at 04:38

## 2021-03-14 RX ADMIN — ENOXAPARIN SODIUM 40 MG: 40 INJECTION SUBCUTANEOUS at 04:38

## 2021-03-14 RX ADMIN — OMEGA-3 FATTY ACIDS CAP 1000 MG 1000 MG: 1000 CAP at 04:39

## 2021-03-14 RX ADMIN — DIVALPROEX SODIUM 500 MG: 500 TABLET, DELAYED RELEASE ORAL at 04:39

## 2021-03-14 RX ADMIN — MONTELUKAST 10 MG: 10 TABLET, FILM COATED ORAL at 20:44

## 2021-03-14 RX ADMIN — DIVALPROEX SODIUM 1500 MG: 500 TABLET, DELAYED RELEASE ORAL at 17:28

## 2021-03-14 RX ADMIN — INSULIN GLARGINE 23 UNITS: 100 INJECTION, SOLUTION SUBCUTANEOUS at 10:16

## 2021-03-14 RX ADMIN — PRAZOSIN HYDROCHLORIDE 4 MG: 2 CAPSULE ORAL at 20:44

## 2021-03-14 RX ADMIN — OMEGA-3 FATTY ACIDS CAP 1000 MG 1000 MG: 1000 CAP at 17:29

## 2021-03-14 ASSESSMENT — ENCOUNTER SYMPTOMS
WHEEZING: 0
WEAKNESS: 0
SPEECH CHANGE: 1
DIAPHORESIS: 0
HEADACHES: 0
SPUTUM PRODUCTION: 0
DIZZINESS: 0
SINUS PAIN: 0
FEVER: 0
SHORTNESS OF BREATH: 0
NERVOUS/ANXIOUS: 0
EYE REDNESS: 0
BACK PAIN: 1
NAUSEA: 0
EYE DISCHARGE: 0
SENSORY CHANGE: 1
FOCAL WEAKNESS: 1
ABDOMINAL PAIN: 0
CHILLS: 0
NECK PAIN: 0
VOMITING: 0
PALPITATIONS: 0
COUGH: 0
DEPRESSION: 0
SORE THROAT: 0
MYALGIAS: 0

## 2021-03-14 ASSESSMENT — PAIN DESCRIPTION - PAIN TYPE
TYPE: ACUTE PAIN

## 2021-03-14 NOTE — CARE PLAN
Problem: Safety  Goal: Will remain free from injury  Note: Bed locked and in lowest position. Call light and personal belongings in reach. Bed alarm in place. Hourly rounding.       Problem: Skin Integrity  Goal: Risk for impaired skin integrity will decrease  Note: Waffle overlay mattress in use. Q2 turns. Pillows in use for support and positioning. Barrier cream in use.

## 2021-03-14 NOTE — PROGRESS NOTES
[facility] Medicine Daily Progress Note    Date of Service  3/13/2021    Chief Complaint  40 y.o. male admitted 3/9/2021 with right-sided weakness, difficulty speaking.    Interval Problem Update  No acute complaints this morning.  Continues to deny headache, chest pain, abd pain, SOB.      Consultants/Specialty  Neurology.  Psychiatry.    Code Status  Full Code    Disposition  Neurology unit.    Review of Systems  Review of Systems   Constitutional: Negative for chills, diaphoresis and fever.   HENT: Negative for congestion, hearing loss, sinus pain and sore throat.    Eyes: Negative for discharge and redness.        Positive for blindness in left eye, chronic.   Respiratory: Negative for cough, sputum production, shortness of breath and wheezing.    Cardiovascular: Negative for chest pain and palpitations.   Gastrointestinal: Negative for abdominal pain, nausea and vomiting.   Genitourinary: Negative for dysuria and urgency.   Musculoskeletal: Positive for back pain (Chronic). Negative for myalgias and neck pain.   Skin: Negative for itching.   Neurological: Positive for sensory change, speech change and focal weakness. Negative for dizziness, weakness and headaches.   Endo/Heme/Allergies: Negative for environmental allergies.   Psychiatric/Behavioral: Negative for depression. The patient is not nervous/anxious.              Physical Exam  Temp:  [36.1 °C (96.9 °F)-36.4 °C (97.5 °F)] 36.4 °C (97.5 °F)  Pulse:  [65-73] 68  Resp:  [16-18] 18  BP: (100-137)/(56-74) 114/74  SpO2:  [92 %-98 %] 96 %    Physical Exam  Vitals and nursing note reviewed.   Constitutional:       General: He is not in acute distress.     Appearance: He is obese. He is not toxic-appearing or diaphoretic.   HENT:      Head: Normocephalic and atraumatic.      Nose: No congestion.      Mouth/Throat:      Mouth: Mucous membranes are moist.      Pharynx: Oropharynx is clear.   Eyes:      General: No scleral icterus.     Conjunctiva/sclera:  Conjunctivae normal.   Cardiovascular:      Rate and Rhythm: Normal rate and regular rhythm.      Pulses: Normal pulses.      Heart sounds: Normal heart sounds. No murmur.   Pulmonary:      Effort: Pulmonary effort is normal. No respiratory distress.      Breath sounds: Normal breath sounds. No stridor. No wheezing.   Abdominal:      General: Bowel sounds are normal. There is no distension.      Palpations: Abdomen is soft.      Tenderness: There is no abdominal tenderness. There is no guarding or rebound.   Musculoskeletal:         General: No swelling or tenderness.   Skin:     Capillary Refill: Capillary refill takes less than 2 seconds.      Coloration: Skin is not jaundiced.   Neurological:      Mental Status: He is alert and oriented to person, place, and time. Mental status is at baseline.      Comments: Speech is dysarthric with occasional word finding difficulties though improving.  2 through 12 intact with exception of left eye (chronic blindness).  Strength left side 5 out of 5.  Unchanged.  Right side patient with 1-2 out of 5 strength strength on strength testing.  When I lift his right arm and test for tone and that goes his arm, he controls it on its way down.  This is unchanged.   Psychiatric:         Mood and Affect: Mood normal.         Behavior: Behavior normal.         Fluids    Intake/Output Summary (Last 24 hours) at 3/13/2021 1851  Last data filed at 3/13/2021 1300  Gross per 24 hour   Intake 640 ml   Output 1300 ml   Net -660 ml       Laboratory  Recent Labs     03/11/21  0336 03/13/21  0141   WBC 10.0 9.5   RBC 4.50* 4.53*   HEMOGLOBIN 12.7* 12.7*   HEMATOCRIT 39.7* 39.1*   MCV 88.2 86.3   MCH 28.2 28.0   MCHC 32.0* 32.5*   RDW 45.2 43.5   PLATELETCT 303 288   MPV 10.1 9.9     Recent Labs     03/11/21  0336 03/13/21  0141   SODIUM 136 136   POTASSIUM 3.9 3.7   CHLORIDE 100 98   CO2 21 23   GLUCOSE 114* 170*   BUN 22 24*   CREATININE 0.97 1.07   CALCIUM 9.1 9.4                "    Imaging  MR-BRAIN-W/O   Final Result      1.  Diffuse supratentorial white matter T2 hyperintensity. There is also mild increased T2 signal intensity in the bilateral cerebellar hemisphere. This finding is unchanged since the previous MRI dated 12/1/2014. This is nonspecific finding and likely    represent severe chronic white matter leukoencephalopathy.   2.  Moderate cerebral and cerebellar volume loss.   3.  There has been no significant interval change since the 2/6/2021      DX-CHEST-PORTABLE (1 VIEW)   Final Result      No acute cardiac or pulmonary abnormality is noted.      CT-CTA NECK WITH & W/O-POST PROCESSING   Final Result      1.  CT angiogram of the neck within normal limits.      2.  Hypervascular masses anterior to the thyroid and cricoid cartilage again noted.      CT-CTA HEAD WITH & W/O-POST PROCESS   Final Result      CT angiogram of the [place] within normal limits.      CT-CEREBRAL PERFUSION ANALYSIS   Final Result      1.  Cerebral blood flow less than 30% likely representing completed infarct = 0 mL.      2.  T Max more than 6 seconds likely representing combination of completed infarct and ischemia = 0 mL.      3.  Mismatched volume likely representing ischemic brain/penumbra = None      4.  Please note that the cerebral perfusion was performed on the limited brain tissue around the basal ganglia region. Infarct/ischemia outside the CT perfusion sections can be missed in this study.      CT-HEAD W/O   Final Result      1.  No acute intracranial process.      2.  Unchanged diffuse small vessel ischemic change.           Assessment/Plan  Right hemiparesis (HCC)- (present on admission)  Assessment & Plan  - Etiology is elusive.  Patient does have a history of migraines and seizures and certainly these could be atypical presentations of these processes.  MRI brain showed \"Diffuse supratentorial white matter T2 hyperintensity. There is also mild increased T2 signal intensity in the " "bilateral cerebellar hemisphere. This finding is unchanged since the previous MRI dated 12/1/2014. This is nonspecific finding and likely represent severe chronic white matter leukoencephalopathy.  Moderate cerebral and cerebellar volume loss.  There has been no significant interval change since the 2/6/2021.\"  - Additionally he could have some contribution of psychogenic etiology as his physical exam is somewhat inconsistent.  Appreciated Psychiatry recs.    - PT/OT recommends post acute placement.   - Aspirin and high-dose statin.     Seizure disorder (HCC)- (present on admission)  Assessment & Plan  Patient is maintained on Topamax and Depakote, we will continue his baseline doses.  Neurology has been consulted    HTN (hypertension)- (present on admission)  Assessment & Plan  Patient is maintained on prazosin, lisinopril as an outpatient.  Continue to doubt this is an acute ischemic event; continue his home medications monitor and titrate as appropriate    HLD (hyperlipidemia)- (present on admission)  Assessment & Plan  Continue high-dose statin    Type 2 diabetes mellitus without complication, without long-term current use of insulin (HCC)- (present on admission)  Assessment & Plan  Patient is maintained on Metformin, empagliflozin, and insulin Lantus 23 units twice daily.  We will continue his baseline therapy regimen with sliding scale coverage beyond that.  His last A1c was 9.0 and that was 1 month ago.    PTSD (post-traumatic stress disorder)- (present on admission)  Assessment & Plan  Continue home psychiatric regimen    Asthma- (present on admission)  Assessment & Plan  Placed on O2 and RT protocols  Continue inhaled steroid and long-acting beta agonist  Continue montelukast      Acquired hypothyroidism- (present on admission)  Assessment & Plan  Continue replacement  Last TSH was 48.61-month ago, this will need to be checked in the next 2 to 4 weeks       VTE prophylaxis: enoxaparin.   "

## 2021-03-14 NOTE — CARE PLAN
Problem: Knowledge Deficit  Goal: Knowledge of disease process/condition, treatment plan, diagnostic tests, and medications will improve  Outcome: PROGRESSING AS EXPECTED  Note: Updated pt on poc. Answered any questions.      Problem: Mobility  Goal: Risk for activity intolerance will decrease  Outcome: PROGRESSING AS EXPECTED  Note: Pt at risk for activity intolerance related to weakness. Encouraging pt to move and turn in the bed.

## 2021-03-14 NOTE — PROGRESS NOTES
[facility] Medicine Daily Progress Note    Date of Service  3/14/2021    Chief Complaint  40 y.o. male admitted 3/9/2021 with right-sided weakness, difficulty speaking.    Interval Problem Update  Patient seen and examined at bedside.  No acute complaints, denying headache, chest pain, shortness of breath, abdominal pain.  Per case management, patient does not qualify for acute rehab because of inadequate disposition thereafter.  They are now working on skilled nursing facility.    Consultants/Specialty  Neurology.  Psychiatry.    Code Status  Full Code    Disposition  Neurology unit.    Review of Systems  Review of Systems   Constitutional: Negative for chills, diaphoresis and fever.   HENT: Negative for congestion, hearing loss, sinus pain and sore throat.    Eyes: Negative for discharge and redness.        Positive for blindness in left eye, chronic.   Respiratory: Negative for cough, sputum production, shortness of breath and wheezing.    Cardiovascular: Negative for chest pain and palpitations.   Gastrointestinal: Negative for abdominal pain, nausea and vomiting.   Genitourinary: Negative for dysuria and urgency.   Musculoskeletal: Positive for back pain (Chronic). Negative for myalgias and neck pain.   Skin: Negative for itching.   Neurological: Positive for sensory change, speech change and focal weakness. Negative for dizziness, weakness and headaches.   Endo/Heme/Allergies: Negative for environmental allergies.   Psychiatric/Behavioral: Negative for depression. The patient is not nervous/anxious.              Physical Exam  Temp:  [36 °C (96.8 °F)-36.4 °C (97.6 °F)] 36 °C (96.8 °F)  Pulse:  [59-69] 69  Resp:  [16-18] 16  BP: (100-123)/(54-78) 123/78  SpO2:  [93 %-97 %] 93 %    Physical Exam  Vitals and nursing note reviewed.   Constitutional:       General: He is not in acute distress.     Appearance: He is obese. He is not toxic-appearing or diaphoretic.   HENT:      Head: Normocephalic and atraumatic.       Nose: No congestion.      Mouth/Throat:      Mouth: Mucous membranes are moist.      Pharynx: Oropharynx is clear.   Eyes:      General: No scleral icterus.     Conjunctiva/sclera: Conjunctivae normal.   Cardiovascular:      Rate and Rhythm: Normal rate and regular rhythm.      Pulses: Normal pulses.      Heart sounds: Normal heart sounds. No murmur.   Pulmonary:      Effort: Pulmonary effort is normal. No respiratory distress.      Breath sounds: Normal breath sounds. No stridor. No wheezing.   Abdominal:      General: Bowel sounds are normal. There is no distension.      Palpations: Abdomen is soft.      Tenderness: There is no abdominal tenderness. There is no guarding or rebound.   Musculoskeletal:         General: No swelling or tenderness.   Skin:     Capillary Refill: Capillary refill takes less than 2 seconds.      Coloration: Skin is not jaundiced.   Neurological:      Mental Status: He is alert and oriented to person, place, and time. Mental status is at baseline.      Comments: Speech is dysarthric with occasional word finding difficulties though improving.  2 through 12 intact with exception of left eye (chronic blindness).  Strength left side 5 out of 5.  Unchanged.  Right side patient with 1-2 out of 5 strength strength on strength testing.  When I lift his right arm and test for tone and that goes his arm, he controls it on its way down.  This is unchanged.   Psychiatric:         Mood and Affect: Mood normal.         Behavior: Behavior normal.         Fluids    Intake/Output Summary (Last 24 hours) at 3/14/2021 1332  Last data filed at 3/14/2021 0400  Gross per 24 hour   Intake 360 ml   Output --   Net 360 ml       Laboratory  Recent Labs     03/13/21  0141   WBC 9.5   RBC 4.53*   HEMOGLOBIN 12.7*   HEMATOCRIT 39.1*   MCV 86.3   MCH 28.0   MCHC 32.5*   RDW 43.5   PLATELETCT 288   MPV 9.9     Recent Labs     03/13/21  0141   SODIUM 136   POTASSIUM 3.7   CHLORIDE 98   CO2 23   GLUCOSE 170*   BUN 24*  "  CREATININE 1.07   CALCIUM 9.4                   Imaging  MR-BRAIN-W/O   Final Result      1.  Diffuse supratentorial white matter T2 hyperintensity. There is also mild increased T2 signal intensity in the bilateral cerebellar hemisphere. This finding is unchanged since the previous MRI dated 12/1/2014. This is nonspecific finding and likely    represent severe chronic white matter leukoencephalopathy.   2.  Moderate cerebral and cerebellar volume loss.   3.  There has been no significant interval change since the 2/6/2021      DX-CHEST-PORTABLE (1 VIEW)   Final Result      No acute cardiac or pulmonary abnormality is noted.      CT-CTA NECK WITH & W/O-POST PROCESSING   Final Result      1.  CT angiogram of the neck within normal limits.      2.  Hypervascular masses anterior to the thyroid and cricoid cartilage again noted.      CT-CTA HEAD WITH & W/O-POST PROCESS   Final Result      CT angiogram of the [place] within normal limits.      CT-CEREBRAL PERFUSION ANALYSIS   Final Result      1.  Cerebral blood flow less than 30% likely representing completed infarct = 0 mL.      2.  T Max more than 6 seconds likely representing combination of completed infarct and ischemia = 0 mL.      3.  Mismatched volume likely representing ischemic brain/penumbra = None      4.  Please note that the cerebral perfusion was performed on the limited brain tissue around the basal ganglia region. Infarct/ischemia outside the CT perfusion sections can be missed in this study.      CT-HEAD W/O   Final Result      1.  No acute intracranial process.      2.  Unchanged diffuse small vessel ischemic change.           Assessment/Plan  Right hemiparesis (HCC)- (present on admission)  Assessment & Plan  - Etiology is elusive.  Patient does have a history of migraines and seizures and certainly these could be atypical presentations of these processes.  MRI brain showed \"Diffuse supratentorial white matter T2 hyperintensity. There is " "also mild increased T2 signal intensity in the bilateral cerebellar hemisphere. This finding is unchanged since the previous MRI dated 12/1/2014. This is nonspecific finding and likely represent severe chronic white matter leukoencephalopathy.  Moderate cerebral and cerebellar volume loss.  There has been no significant interval change since the 2/6/2021.\"  - Additionally he could have some contribution of psychogenic etiology as his physical exam is somewhat inconsistent.  Appreciated Psychiatry recs.    - PT/OT recommends post acute placement.   - Aspirin and high-dose statin.     Seizure disorder (HCC)- (present on admission)  Assessment & Plan  Patient is maintained on Topamax and Depakote, we will continue his baseline doses.  Neurology has been consulted    HTN (hypertension)- (present on admission)  Assessment & Plan  Patient is maintained on prazosin, lisinopril as an outpatient.  Continue to doubt this is an acute ischemic event; continue his home medications monitor and titrate as appropriate    HLD (hyperlipidemia)- (present on admission)  Assessment & Plan  Continue high-dose statin    Type 2 diabetes mellitus without complication, without long-term current use of insulin (HCC)- (present on admission)  Assessment & Plan  Patient is maintained on Metformin, empagliflozin, and insulin Lantus 23 units twice daily.  We will continue his baseline therapy regimen with sliding scale coverage beyond that.  His last A1c was 9.0 and that was 1 month ago.    PTSD (post-traumatic stress disorder)- (present on admission)  Assessment & Plan  Continue home psychiatric regimen    Asthma- (present on admission)  Assessment & Plan  Placed on O2 and RT protocols  Continue inhaled steroid and long-acting beta agonist  Continue montelukast      Acquired hypothyroidism- (present on admission)  Assessment & Plan  Continue replacement  Last TSH was 48.61-month ago, this will need to be checked in the next 2 to 4 weeks       VTE " prophylaxis: enoxaparin.

## 2021-03-15 VITALS
HEIGHT: 78 IN | DIASTOLIC BLOOD PRESSURE: 64 MMHG | RESPIRATION RATE: 16 BRPM | SYSTOLIC BLOOD PRESSURE: 96 MMHG | TEMPERATURE: 97 F | BODY MASS INDEX: 33.54 KG/M2 | WEIGHT: 289.9 LBS | HEART RATE: 65 BPM | OXYGEN SATURATION: 98 %

## 2021-03-15 LAB
ANION GAP SERPL CALC-SCNC: 12 MMOL/L (ref 7–16)
BASOPHILS # BLD AUTO: 0.8 % (ref 0–1.8)
BASOPHILS # BLD: 0.06 K/UL (ref 0–0.12)
BUN SERPL-MCNC: 20 MG/DL (ref 8–22)
CALCIUM SERPL-MCNC: 8.8 MG/DL (ref 8.5–10.5)
CHLORIDE SERPL-SCNC: 98 MMOL/L (ref 96–112)
CO2 SERPL-SCNC: 24 MMOL/L (ref 20–33)
CREAT SERPL-MCNC: 0.83 MG/DL (ref 0.5–1.4)
EOSINOPHIL # BLD AUTO: 0.1 K/UL (ref 0–0.51)
EOSINOPHIL NFR BLD: 1.3 % (ref 0–6.9)
ERYTHROCYTE [DISTWIDTH] IN BLOOD BY AUTOMATED COUNT: 43.8 FL (ref 35.9–50)
GLUCOSE BLD-MCNC: 131 MG/DL (ref 65–99)
GLUCOSE BLD-MCNC: 133 MG/DL (ref 65–99)
GLUCOSE SERPL-MCNC: 138 MG/DL (ref 65–99)
HCT VFR BLD AUTO: 39.1 % (ref 42–52)
HGB BLD-MCNC: 12.6 G/DL (ref 14–18)
IMM GRANULOCYTES # BLD AUTO: 0.08 K/UL (ref 0–0.11)
IMM GRANULOCYTES NFR BLD AUTO: 1.1 % (ref 0–0.9)
LYMPHOCYTES # BLD AUTO: 3.06 K/UL (ref 1–4.8)
LYMPHOCYTES NFR BLD: 40.7 % (ref 22–41)
MCH RBC QN AUTO: 27.9 PG (ref 27–33)
MCHC RBC AUTO-ENTMCNC: 32.2 G/DL (ref 33.7–35.3)
MCV RBC AUTO: 86.7 FL (ref 81.4–97.8)
MONOCYTES # BLD AUTO: 0.72 K/UL (ref 0–0.85)
MONOCYTES NFR BLD AUTO: 9.6 % (ref 0–13.4)
NEUTROPHILS # BLD AUTO: 3.5 K/UL (ref 1.82–7.42)
NEUTROPHILS NFR BLD: 46.5 % (ref 44–72)
NRBC # BLD AUTO: 0 K/UL
NRBC BLD-RTO: 0 /100 WBC
PLATELET # BLD AUTO: 253 K/UL (ref 164–446)
PMV BLD AUTO: 10.3 FL (ref 9–12.9)
POTASSIUM SERPL-SCNC: 3.7 MMOL/L (ref 3.6–5.5)
RBC # BLD AUTO: 4.51 M/UL (ref 4.7–6.1)
SODIUM SERPL-SCNC: 134 MMOL/L (ref 135–145)
WBC # BLD AUTO: 7.5 K/UL (ref 4.8–10.8)

## 2021-03-15 PROCEDURE — 80048 BASIC METABOLIC PNL TOTAL CA: CPT

## 2021-03-15 PROCEDURE — 700102 HCHG RX REV CODE 250 W/ 637 OVERRIDE(OP): Performed by: HOSPITALIST

## 2021-03-15 PROCEDURE — 700111 HCHG RX REV CODE 636 W/ 250 OVERRIDE (IP): Performed by: HOSPITALIST

## 2021-03-15 PROCEDURE — 36415 COLL VENOUS BLD VENIPUNCTURE: CPT

## 2021-03-15 PROCEDURE — 99239 HOSP IP/OBS DSCHRG MGMT >30: CPT | Performed by: STUDENT IN AN ORGANIZED HEALTH CARE EDUCATION/TRAINING PROGRAM

## 2021-03-15 PROCEDURE — 97535 SELF CARE MNGMENT TRAINING: CPT

## 2021-03-15 PROCEDURE — 82962 GLUCOSE BLOOD TEST: CPT | Mod: 91

## 2021-03-15 PROCEDURE — 85025 COMPLETE CBC W/AUTO DIFF WBC: CPT

## 2021-03-15 PROCEDURE — A9270 NON-COVERED ITEM OR SERVICE: HCPCS | Performed by: HOSPITALIST

## 2021-03-15 RX ORDER — DIVALPROEX SODIUM 500 MG/1
1500 TABLET, DELAYED RELEASE ORAL EVERY EVENING
Qty: 90 TABLET | Status: SHIPPED
Start: 2021-03-15 | End: 2021-07-01

## 2021-03-15 RX ORDER — DIVALPROEX SODIUM 500 MG/1
500 TABLET, DELAYED RELEASE ORAL EVERY MORNING
Qty: 90 TABLET | Status: SHIPPED
Start: 2021-03-16 | End: 2021-07-01

## 2021-03-15 RX ADMIN — SERTRALINE HYDROCHLORIDE 100 MG: 100 TABLET, FILM COATED ORAL at 06:00

## 2021-03-15 RX ADMIN — LEVOTHYROXINE SODIUM 200 MCG: 0.2 TABLET ORAL at 05:17

## 2021-03-15 RX ADMIN — ENOXAPARIN SODIUM 40 MG: 40 INJECTION SUBCUTANEOUS at 05:17

## 2021-03-15 RX ADMIN — METFORMIN HYDROCHLORIDE 1000 MG: 500 TABLET ORAL at 09:34

## 2021-03-15 RX ADMIN — INSULIN GLARGINE 23 UNITS: 100 INJECTION, SOLUTION SUBCUTANEOUS at 09:34

## 2021-03-15 RX ADMIN — ASPIRIN 325 MG ORAL TABLET 325 MG: 325 PILL ORAL at 05:17

## 2021-03-15 RX ADMIN — OMEGA-3 FATTY ACIDS CAP 1000 MG 1000 MG: 1000 CAP at 05:17

## 2021-03-15 RX ADMIN — DIVALPROEX SODIUM 500 MG: 500 TABLET, DELAYED RELEASE ORAL at 05:17

## 2021-03-15 ASSESSMENT — COGNITIVE AND FUNCTIONAL STATUS - GENERAL
DAILY ACTIVITIY SCORE: 18
PERSONAL GROOMING: A LITTLE
DRESSING REGULAR UPPER BODY CLOTHING: A LITTLE
SUGGESTED CMS G CODE MODIFIER DAILY ACTIVITY: CK
TOILETING: A LITTLE
DRESSING REGULAR LOWER BODY CLOTHING: A LITTLE
HELP NEEDED FOR BATHING: A LITTLE
EATING MEALS: A LITTLE

## 2021-03-15 ASSESSMENT — PAIN DESCRIPTION - PAIN TYPE
TYPE: ACUTE PAIN
TYPE: ACUTE PAIN

## 2021-03-15 NOTE — PROGRESS NOTES
1400: Report given to Carrie ANNE at [facility], all questions answered, call back number provided for questions. Attempted to call pt's mother Leslie, stopped ringing after one ring with no ability to leave voicemail, attempted again with same results. Called significant other Vira and left voicemail with call back number updating that pt would be getting picked up and taken to [place]. Discussed discharge plan with pt, pt agreeable, all questions answered. Cobra, facesheet, and AVS given to [facility] transporter. Pt discharged via wheelchair with transporter, all belongings with pt.

## 2021-03-15 NOTE — DISCHARGE SUMMARY
Discharge Summary    CHIEF COMPLAINT ON ADMISSION  Chief Complaint   Patient presents with   • Possible Stroke     BIB EMS for Rt sided weakness, difficulty speaking, and tingling x1hr.       Reason for Admission  Stroke Pre Alert     CODE STATUS  Full Code    HPI & HOSPITAL COURSE  This is a 40 y.o. male who presented as a stroke alert.  He has been admitted several times in the past year with similar presentation.  He is here with right-sided weakness, which the patient reports has a chronicity to it.  Neurology was consulted, who reported that exam for right-sided weakness was inconsistent.  CT/CTA head/neck was unremarkable here.  Neurology does not feel like this is a CVA but rather conversion disorder.  Patient was not a TPA candidate secondary to inconsistent exam and neurology's assessment that this is not consistent with a stroke.  MRI brain was done, which was unchanged from the past MRI and showed no evidence of CVA.  Patient was also evaluated by Psychiatry.  He does have a seizure disorder as well as a learning disability.  Patient was also evaluated by PT/OT, who recommended postacute placement after hospitalization.  Patient will be discharged to [facility] for continued follow-up.    Therefore, he is discharged in fair and stable condition to skilled nursing facility.    The patient met 2-midnight criteria for an inpatient stay at the time of discharge.      FOLLOW UP ITEMS POST DISCHARGE  Follow-up with PCP within 1 week of discharge from skilled nursing facility.  Follow-up with [facility] nursing facility physician.    DISCHARGE DIAGNOSES  Active Problems:    Right hemiparesis (HCC) POA: Yes    Seizure disorder (HCC) POA: Yes    Asthma POA: Yes    PTSD (post-traumatic stress disorder) POA: Yes      Overview: IMO load March 2020    Type 2 diabetes mellitus without complication, without long-term current use of insulin (HCC) POA: Yes    HLD (hyperlipidemia) POA: Yes    HTN  (hypertension) POA: Yes    Acquired hypothyroidism POA: Yes  Resolved Problems:    * No resolved hospital problems. *      FOLLOW UP  [facility]  [address] [address]  [phone]        ANIKET Davis  [address]  [place] [id number] [phone]    Schedule an appointment as soon as possible for a visit in 1 week        MEDICATIONS ON DISCHARGE     Medication List      CHANGE how you take these medications      Instructions   * divalproex 500 MG Tbec  What changed:   · Another medication with the same name was changed. Make sure you understand how and when to take each.  · Another medication with the same name was removed. Continue taking this medication, and follow the directions you see here.  Commonly known as: DEPAKOTE   Take 3 Tablets by mouth every evening.  Dose: 1,500 mg     * divalproex 500 MG Tbec  Start taking on: March 16, 2021  What changed:   · how much to take  · Another medication with the same name was removed. Continue taking this medication, and follow the directions you see here.  Commonly known as: DEPAKOTE   Take 1 tablet by mouth every morning.  Dose: 500 mg         * This list has 2 medication(s) that are the same as other medications prescribed for you. Read the directions carefully, and ask your doctor or other care provider to review them with you.            CONTINUE taking these medications      Instructions   acetaminophen 500 MG Tabs  Commonly known as: TYLENOL   Take 1-2 Tablets by mouth every 6 hours as needed for Moderate Pain (headache).  Dose: 500-1,000 mg     albuterol 108 (90 Base) MCG/ACT Aers inhalation aerosol   Inhale 2 Puffs every 6 hours as needed for Shortness of Breath.  Dose: 2 Puff     aspirin EC 81 MG Tbec  Commonly known as: ECOTRIN   Take 81 mg by mouth every morning.  Dose: 81 mg     atorvastatin 80 MG tablet  Commonly known as: LIPITOR   Take 80 mg by mouth every evening.  Dose: 80 mg     fish oil 1000  MG Caps capsule   Take 1,000 mg by mouth 2 Times a Day.  Dose: 1,000 mg     insulin glargine 100 UNIT/ML Sopn injection  Generic drug: insulin glargine   Inject 26 Units under the skin 2 (two) times a day. Decrease to 20 units for BG less than 90  Dose: 26 Units     Jardiance 25 MG Tabs  Generic drug: Empagliflozin   Take 25 mg by mouth every day.  Dose: 25 mg     Latuda 20 MG Tabs  Generic drug: lurasidone   Take 20 mg by mouth every bedtime.  Dose: 20 mg     levothyroxine 200 MCG Tabs  Commonly known as: SYNTHROID   Take 200 mcg by mouth Every morning on an empty stomach.  Dose: 200 mcg     lisinopril 10 MG Tabs  Commonly known as: PRINIVIL   Take 10 mg by mouth every day.  Dose: 10 mg     metformin 1000 MG tablet  Commonly known as: GLUCOPHAGE   Take 1 tablet by mouth 2 times a day, with meals for 30 days.  Dose: 1,000 mg     montelukast 10 MG Tabs  Commonly known as: SINGULAIR   Take 10 mg by mouth every bedtime.  Dose: 10 mg     omeprazole 20 MG delayed-release capsule  Commonly known as: PRILOSEC   Take 20 mg by mouth every day.  Dose: 20 mg     ondansetron 4 MG Tbdp  Commonly known as: Zofran ODT   Take 1 Tab by mouth every 6 hours as needed.  Dose: 4 mg     prazosin 2 MG Caps  Commonly known as: MINIPRESS   Take 4 mg by mouth every bedtime.  Dose: 4 mg     sertraline 100 MG Tabs  Commonly known as: Zoloft   Take 100 mg by mouth every day.  Dose: 100 mg     Symbicort 160-4.5 MCG/ACT Aero  Generic drug: budesonide-formoterol   Inhale 2 Puffs 2 Times a Day.  Dose: 2 Puff     Vitamin D3 2000 UNIT Caps   Take 4,000 Units by mouth every bedtime.  Dose: 4,000 Units        STOP taking these medications    busPIRone 10 MG Tabs tablet  Commonly known as: BUSPAR     cyclobenzaprine 10 mg Tabs  Commonly known as: Flexeril     docusate sodium 100 MG Caps  Commonly known as: COLACE     Fenofibrate 134 MG Caps     hydrOXYzine HCl 25 MG Tabs  Commonly known as: ATARAX     ibuprofen 600 MG Tabs  Commonly known as: MOTRIN    "  latanoprost 0.005 % Soln  Commonly known as: XALATAN     lidocaine 5 % Ptch  Commonly known as: LIDODERM     naproxen 500 MG Tabs  Commonly known as: NAPROSYN     polyethylene glycol 3350 17 GM/SCOOP Powd  Commonly known as: MiraLax     topiramate 25 MG Tabs  Commonly known as: TOPAMAX     topiramate 50 MG tablet  Commonly known as: TOPAMAX     Travatan Z 0.004 % Soln  Generic drug: travoprost     traZODone 100 MG Tabs  Commonly known as: DESYREL            Allergies  Allergies   Allergen Reactions   • Geodon [Ziprasidone Hcl] Anaphylaxis     Anaphylaxis per patient   • Abilify      \"Feeling tired, like I don't even know whats going on around me\"   • Fish      Pt reports fish causes him to be sick to his stomach  Not listed on MAR noted 2/3/2021         DIET  Orders Placed This Encounter   Procedures   • Diet Order Diet: Consistent CHO (Diabetic); Tray Modifications (optional): SLP - Deliver to Nursing Station     Standing Status:   Standing     Number of Occurrences:   1     Order Specific Question:   Diet:     Answer:   Consistent CHO (Diabetic) [4]     Order Specific Question:   Tray Modifications (optional)     Answer:   SLP - Deliver to Nursing Station       ACTIVITY  As tolerated and directed by skilled nursing.  Weight bearing as tolerated    LINES, DRAINS, AND WOUNDS  This is an automated list. Peripheral IVs will be removed prior to discharge.  Peripheral IV 03/10/21 22 G Anterior;Proximal;Right Forearm (Active)   Site Assessment Clean;Dry;Intact 03/14/21 2000   Dressing Type Transparent 03/14/21 2000   Line Status Scrubbed the hub prior to access;No blood return;Flushed;Saline locked 03/14/21 2000   Dressing Status Clean;Dry;Intact 03/14/21 2000   Dressing Intervention N/A 03/14/21 2000   Infiltration Grading ([facility]) 0 03/14/21 2000   Phlebitis Scale ([facility] Only) 0 03/14/21 2000          Peripheral IV 03/10/21 22 G Anterior;Proximal;Right Forearm (Active)   Site Assessment Clean;Dry;Intact " 03/14/21 2000   Dressing Type Transparent 03/14/21 2000   Line Status Scrubbed the hub prior to access;No blood return;Flushed;Saline locked 03/14/21 2000   Dressing Status Clean;Dry;Intact 03/14/21 2000   Dressing Intervention N/A 03/14/21 2000   Infiltration Grading ([facility] 0 03/14/21 2000   Phlebitis Scale ([facility]) 0 03/14/21 2000               MENTAL STATUS ON TRANSFER  Level of Consciousness: Alert  Orientation : Oriented x 4  Speech: Expressive Aphasia    CONSULTATIONS  Neurology - Dr. Barcenas.   Psychiatry - Dr. Colón.     PROCEDURES  None.     LABORATORY  Lab Results   Component Value Date    SODIUM 134 (L) 03/15/2021    POTASSIUM 3.7 03/15/2021    CHLORIDE 98 03/15/2021    CO2 24 03/15/2021    GLUCOSE 138 (H) 03/15/2021    BUN 20 03/15/2021    CREATININE 0.83 03/15/2021        Lab Results   Component Value Date    WBC 7.5 03/15/2021    HEMOGLOBIN 12.6 (L) 03/15/2021    HEMATOCRIT 39.1 (L) 03/15/2021    PLATELETCT 253 03/15/2021        Total time of the discharge process: 41 minutes.

## 2021-03-15 NOTE — DISCHARGE PLANNING
Agency/Facility Name: [facility]  Spoke To: [facility]  Outcome: Transport arranged for 1400 via [facility].    LSW Trish notified.

## 2021-03-15 NOTE — CARE PLAN
Problem: Communication  Goal: The ability to communicate needs accurately and effectively will improve  Outcome: PROGRESSING AS EXPECTED  Note: Slight expressive aphasia, all needs assessed, hourly rounding in place     Problem: Safety  Goal: Will remain free from falls  Outcome: PROGRESSING AS EXPECTED  Note: Treaded slipper socks on, bed in lowest and locked position, bed alarm on, educated on the need to call for assistance, call light within reach

## 2021-03-15 NOTE — DISCHARGE PLANNING
Agency[facility]Facility Name: [facility]  Spoke To: [facility]  Outcome: Patient accepted, bed available today. [facility] will not have a bed until tomorrow. Will arrange transport for [facility] and update this DPA with time.     GREGORY Chambers notified.

## 2021-03-15 NOTE — DISCHARGE INSTRUCTIONS
Discharge Instructions    Discharged to other by medical transportation with escort. Discharged via wheelchair, hospital escort: Yes.  Special equipment needed: Walker    Be sure to schedule a follow-up appointment with your primary care doctor or any specialists as instructed.     Discharge Plan:   Diet Plan: Discussed  Activity Level: Discussed  Confirmed Follow up Appointment: Patient to Call and Schedule Appointment  Confirmed Symptoms Management: Discussed  Medication Reconciliation Updated: Yes  Influenza Vaccine Indication: Not indicated: Previously immunized this influenza season and > 8 years of age    I understand that a diet low in cholesterol, fat, and sodium is recommended for good health. Unless I have been given specific instructions below for another diet, I accept this instruction as my diet prescription.   Other diet: diabetic    Special Instructions: None    · Is patient discharged on Warfarin / Coumadin?   No     Depression / Suicide Risk    As you are discharged from this [facility] facility, it is important to learn how to keep safe from harming yourself.    Recognize the warning signs:  · Abrupt changes in personality, positive or negative- including increase in energy   · Giving away possessions  · Change in eating patterns- significant weight changes-  positive or negative  · Change in sleeping patterns- unable to sleep or sleeping all the time   · Unwillingness or inability to communicate  · Depression  · Unusual sadness, discouragement and loneliness  · Talk of wanting to die  · Neglect of personal appearance   · Rebelliousness- reckless behavior  · Withdrawal from people/activities they love  · Confusion- inability to concentrate     If you or a loved one observes any of these behaviors or has concerns about self-harm, here's what you can do:  · Talk about it- your feelings and reasons for harming yourself  · Remove any means that you might use to hurt yourself (examples: pills, rope,  extension cords, firearm)  · Get professional help from the community (Mental Health, Substance Abuse, psychological counseling)  · Do not be alone:Call your Safe Contact- someone whom you trust who will be there for you.  · Call your local CRISIS HOTLINE 417-6760 or 746-477-1641  · Call your local Children's Mobile Crisis Response Team [place] (325) 322-2628 or www.Yuyuto  · Call the toll free National Suicide Prevention Hotlines   · National Suicide Prevention Lifeline 229-059-GYFF (3714)  · National Hope Line Network 800-SUICIDE (428-8496)    Return to the ED if new or worsening symptoms.

## 2021-03-15 NOTE — THERAPY
Occupational Therapy  Daily Treatment     Patient Name: Norm Prabhakar  Age:  40 y.o., Sex:  male  Medical Record #: [id number]  Today's Date: 3/15/2021     Precautions  Precautions: (P) Fall Risk  Comments: (P) Hx conversion disorder, R sided deficits    Assessment    Pt seen for OT treatment today. He presented w/ decreased activity tolerance, decreased cognition, and decreased fxn of R UE/LE that was variable. He was able to use R UE to hold onto the walker. The patient would still benefit from post acute placement after hospital stay.     Plan    Continue current treatment plan.    DC Equipment Recommendations: (P) Unable to determine at this time  Discharge Recommendations: (P) Recommend post-acute placement for additional occupational therapy services prior to discharge home    Subjective    Pleasant and cooperative     Objective       03/15/21 1108   Precautions   Precautions Fall Risk   Comments Hx conversion disorder, R sided deficits   Pain 0 - 10 Group   Therapist Pain Assessment Nurse Notified;0   Cognition    Cognition / Consciousness X   Speech/ Communication Delayed Responses   Level of Consciousness Alert   Attention Impaired   Initiation Impaired   Comments inconsistant use of R UE throughout treatment   Active ROM Upper Body   Active ROM Upper Body  X   Comments variable use of R UE throughout tx; reports inability to move it and cannot move it on command; however. only required light touch to move it   Strength Upper Body   Upper Body Strength  X   Comments able to maintain  on FWW   Other Treatments   Other Treatments Provided [place] dressing techniques used to don gown and bathrobe. Side steps at EOB to move up towards HOB and then txf to armed chair for grooming. He had difficulty picking up his R LE and instead slid it to move it. He was able to weight bare through R LE to lift L LE to step.   Balance   Sitting Balance (Static) Fair +   Sitting Balance (Dynamic) Fair +   Standing Balance  (Static) Fair   Standing Balance (Dynamic) Fair -   Weight Shift Sitting Fair   Weight Shift Standing Poor   Skilled Intervention Verbal Cuing;Tactile Cuing;Facilitation;Sequencing   Comments w/ FWW   Bed Mobility    Supine to Sit Supervised   Scooting Supervised   Rolling Supervised   Skilled Intervention Verbal Cuing;Facilitation   Comments use L LE to move R LE   Activities of Daily Living   Grooming Supervision;Seated  (sitting in armed chair)   Upper Body Dressing Minimal Assist   Skilled Intervention Verbal Cuing;Tactile Cuing;Sequencing;Facilitation   Comments Min A to get bathrobe around shoulders   Functional Mobility   Sit to Stand Supervised   Bed, Chair, Wheelchair Transfer Minimal Assist   Transfer Method Stand Step  (slid R foot along floor instead of picking it up)   Mobility EOB>side steps>armed chair   Skilled Intervention Verbal Cuing;Facilitation;Sequencing;Tactile Cuing   Comments able to  L foot   Visual Perception   Visual Perception  X   Comments L sided blindness   Activity Tolerance   Sitting Edge of Bed ~10 min   Standing ~2 min   Comments standing w/ FWW; decreased activity tolerance   Patient / Family Goals   Patient / Family Goal #1 to go home   Goal #1 Outcome Progressing as expected   Short Term Goals   Short Term Goal # 1 Pt will perform toilet txf w/ supv   Goal Outcome # 1 Progressing as expected   Short Term Goal # 2 Pt will perform LB dressing w/ supv   Goal Outcome # 2 Goal not met   Short Term Goal # 3 Pt will perform UB dressing w/ supv   Goal Outcome # 3 Progressing as expected   Short Term Goal # 4 Pt will groom in stance w/ supv   Goal Outcome # 4 Progressing as expected   Interdisciplinary Plan of Care Collaboration   IDT Collaboration with  Nursing   Patient Position at End of Therapy Seated;Chair Alarm On;Call Light within Reach;Tray Table within Reach;Phone within Reach   Collaboration Comments Report given

## 2021-03-15 NOTE — DISCHARGE PLANNING
Care Transition Team Discharge Planning    Anticipated Discharge Disposition: DC to [facility].    Action: Lsw met with patient to complete CTT assessment. Patient was A&Ox4; e/b being able to tell this Lsw of his reason for being admitted e/b right side weakness. Lsw observed the patient speech being slurred which could be explained for having a possible stroke. He reported living at [facility]. He can manage his ADL's and IADL's but does not drive. The patient also states that he sees a psychiatrist who addresses his PTSD, depression, Bipolar, and broderline personality. He does take psychotropic medication.    Lsw faxed SNF CHOICE form to DPA.  Lsw placed CHOICE form in the CM basket.    Barriers to Discharge: None    Plan: DC to [facility]      Care Transition Team Assessment    Information Source  Orientation : Oriented x 4  Information Given By: Patient  Informant's Name: (Norm Prabhakar)  Who is responsible for making decisions for patient? : Patient    Readmission Evaluation  Is this a readmission?: Yes - unplanned readmission  Why do you think you were readmitted?: (right side weakness)  Was an appointment arranged for you prior to discharge?: No  Were there new prescriptions you were supposed to fill after you were discharged?: No  Did you understand your discharge instructions?: Yes  Did you have enough support after your last discharge?: Yes    Elopement Risk  Legal Hold: No  Ambulatory or Self Mobile in Wheelchair: No-Not an Elopement Risk  Elopement Risk: Not at Risk for Elopement    Interdisciplinary Discharge Planning  Lives with - Patient's Self Care Capacity: Unrelated Adult, Attendant / Paid Care Giver  Patient or legal guardian wants to designate a caregiver: Yes  Caregiver name: Vira Gee  Caregiver contact info: [phone]  Housing / Facility: Group Home  Prior Services: Intermittent Physical Support for ADL Per Service, Home With Outpatient Therapy(pt reports outpatient SLP tx but  was too hard to get to)    Discharge Preparedness  What is your plan after discharge?: senior care  What are your discharge supports?: Partner  Prior Functional Level: Ambulatory, Independent with Activities of Daily Living, Independent with Medication Management  Difficulity with ADLs: None  Difficulity with IADLs: Managing medication    Functional Assesment  Prior Functional Level: Ambulatory, Independent with Activities of Daily Living, Independent with Medication Management    Finances  Financial Barriers to Discharge: No  Prescription Coverage: Yes    Vision / Hearing Impairment  Vision Impairment : Yes  Left Eye Vision: Blind  Hearing Impairment : Yes  Hearing Impairment: Patient Declines to Wear Hearing Device(s)  Does Pt Need Special Equipment for the Hearing Impaired?: No         Advance Directive  Advance Directive?: None  Advance Directive offered?: AD Booklet refused    Domestic Abuse  Have you ever been the victim of abuse or violence?: No    Psychological Assessment  History of Substance Abuse: Alcohol, Marijuana  Date Last Used - Alcohol: (3 weeks ago)  Date Last Used - Marijuana: 3 weeks ago  Substance Abuse Comments: (last drink 3 weeks ago smokes marijuana for sezures & pain)  History of Psychiatric Problems: Yes  Non-compliant with Treatment: No  Newly Diagnosed Illness: No    Discharge Risks or Barriers  Discharge risks or barriers?: No  Patient risk factors: Mental health, Readmission    Anticipated Discharge Information  Discharge Disposition: Still a Patient (30)  Discharge Address: [address] [place] [address]   Discharge Contact Phone Number: [phone]

## 2021-03-25 NOTE — DISCHARGE PLANNING
note:  Received a call from UZAIR HAWKINS stating that she is checking on missing person report. Officer wants to know where the pt was discharged to. Notified office that pt was discharged to [place].

## 2021-03-25 NOTE — ED NOTES
Roseanne Mittal from [facility] called asking about this patient. Told Roseanne he was no longer her.

## 2021-04-15 ENCOUNTER — APPOINTMENT (OUTPATIENT)
Dept: RADIOLOGY | Facility: MEDICAL CENTER | Age: 41
End: 2021-04-15
Attending: EMERGENCY MEDICINE
Payer: MEDICAID

## 2021-04-15 ENCOUNTER — HOSPITAL ENCOUNTER (EMERGENCY)
Facility: MEDICAL CENTER | Age: 41
End: 2021-04-15
Attending: EMERGENCY MEDICINE | Admitting: EMERGENCY MEDICINE
Payer: MEDICAID

## 2021-04-15 VITALS
RESPIRATION RATE: 17 BRPM | HEIGHT: 78 IN | BODY MASS INDEX: 33.03 KG/M2 | HEART RATE: 85 BPM | OXYGEN SATURATION: 98 % | WEIGHT: 285.5 LBS | DIASTOLIC BLOOD PRESSURE: 78 MMHG | SYSTOLIC BLOOD PRESSURE: 123 MMHG | TEMPERATURE: 98.4 F

## 2021-04-15 DIAGNOSIS — E11.9 TYPE 2 DIABETES MELLITUS WITHOUT COMPLICATION, WITHOUT LONG-TERM CURRENT USE OF INSULIN (HCC): ICD-10-CM

## 2021-04-15 DIAGNOSIS — M25.512 ACUTE PAIN OF LEFT SHOULDER: ICD-10-CM

## 2021-04-15 DIAGNOSIS — F99 PSYCHIATRIC PROBLEM: ICD-10-CM

## 2021-04-15 DIAGNOSIS — F43.10 POSTTRAUMATIC STRESS DISORDER: ICD-10-CM

## 2021-04-15 PROCEDURE — 700111 HCHG RX REV CODE 636 W/ 250 OVERRIDE (IP): Performed by: EMERGENCY MEDICINE

## 2021-04-15 PROCEDURE — 99284 EMERGENCY DEPT VISIT MOD MDM: CPT

## 2021-04-15 PROCEDURE — 93005 ELECTROCARDIOGRAM TRACING: CPT | Performed by: EMERGENCY MEDICINE

## 2021-04-15 PROCEDURE — 96372 THER/PROPH/DIAG INJ SC/IM: CPT

## 2021-04-15 PROCEDURE — 73030 X-RAY EXAM OF SHOULDER: CPT | Mod: LT

## 2021-04-15 RX ORDER — IBUPROFEN 400 MG/1
400 TABLET ORAL EVERY 8 HOURS PRN
Qty: 20 TABLET | Refills: 0 | Status: SHIPPED | OUTPATIENT
Start: 2021-04-15 | End: 2021-07-01

## 2021-04-15 RX ORDER — KETOROLAC TROMETHAMINE 30 MG/ML
30 INJECTION, SOLUTION INTRAMUSCULAR; INTRAVENOUS ONCE
Status: COMPLETED | OUTPATIENT
Start: 2021-04-15 | End: 2021-04-15

## 2021-04-15 RX ADMIN — KETOROLAC TROMETHAMINE 30 MG: 30 INJECTION, SOLUTION INTRAMUSCULAR at 20:44

## 2021-04-15 ASSESSMENT — FIBROSIS 4 INDEX: FIB4 SCORE: 0.74

## 2021-04-15 ASSESSMENT — PAIN DESCRIPTION - PAIN TYPE: TYPE: ACUTE PAIN

## 2021-04-16 LAB — EKG IMPRESSION: NORMAL

## 2021-04-16 NOTE — DISCHARGE INSTRUCTIONS
You were seen and evaluated in the Emergency Department at [facility] for:     Left shoulder pain.    You had the following tests and studies:    Thankfully xray shows no broken bones or dislocations.    You received the following medications:    Ketorolac for pain.    You received the following prescriptions:    Ibuprofen for pain.  ----------------------------    Please make sure to follow up with:    Primary care provider and Dr. Blevins, orthopedics, for recheck. Return to the ER for any new or worse symptoms.    Good luck, we hope you get better soon!  ----------------------------    We always encourage patients to return IMMEDIATELY if they have:  Increased or changing pain, passing out, fevers over 100.4 (taken in your mouth or rectally) for more than 2 days, redness or swelling of skin or tissues, feeling like your heart is beating fast, chest pain that is new or worsening, trouble breathing, feeling like your throat is closing up and can not breath, inability to walk, weakness of any part of your body, new dizziness, severe bleeding that won't stop from any part of your body, if you can't eat or drink, or if you have any other concerns.   If you feel worse, please know that you can always return with any questions, concerns, worse symptoms, or you are feeling unsafe. We certainly cannot say for sure that we have ruled out every illness or dangerous disease, but we feel that at this specific time, your exam, tests, and vital signs like heart rate and blood pressure are safe for discharge.

## 2021-04-16 NOTE — ED PROVIDER NOTES
ED Provider Note    CHIEF COMPLAINT  Chief Complaint   Patient presents with   • Shoulder Pain     left shoulder, says he was trying to clean his room when the pain started, unable to lft heavy weight, +CMS and full ROM noted       HPI    Primary care provider: ANIKET Davis  Means of arrival: Walk-in  History obtained from: Patient  History limited by: Nothing    Norm Prabhakar is a 40 y.o. male who presents with left shoulder pain.  Initially denied any history of injuries but on records review it seems that he was here for a left shoulder injury 1 month ago at this hospital.  He said after week he got better but today his pain got worse after fishing all day.  Denies any new direct falls or injuries or trauma.  No numbness weakness or tingling.  No joint swelling or skin changes.  No fevers or chills.  No other joint pain, no sore throat.  He did not try anything to feel better.  He feels that he has limited range of motion and can only lift his arm up to 90 degrees in abduction and and then he started to develop a sharp nonradiating achy anterior left shoulder pain.  Tender to palpation.  No radiation of pain.  Denies any specific chest pain or cough or dyspnea or history of gout or septic joint or immunosuppression or known close Covid contact.  He has not seen an orthopedist since his shoulder injury last month.    REVIEW OF SYSTEMS  Constitutional: Negative for fever or chills.   Respiratory: Negative for cough or shortness of breath.    Cardiovascular: Negative for chest pain or palpitations.   Gastrointestinal: Negative for nausea, vomiting, or abdominal pain.   Genitourinary: Negative for dysuria or flank pain.   Musculoskeletal: Negative for back pain positive for left shoulder pain.  Skin: Negative for redness or rash.   Neurological: Negative for sensory or motor changes.       PAST MEDICAL HISTORY   has a past medical history of Anxiety, ASTHMA, Bipolar 1 disorder (HCC), Depression,  "Diabetes ([facility]), Fall, Glaucoma, Glaucoma (), Hypothyroidism, Indigestion, Mental disorder, Murmur, Pneumonia, Psychiatric problem (), S/P thyroidectomy, Seizure ([facility]) (), Seizure disorder ([facility]), and Unspecified disorder of thyroid.    PAST FAMILY HISTORY  Family History   Problem Relation Age of Onset   • Hypertension Mother    • Heart Disease Mother    • Lung Disease Mother    • Stroke Maternal Grandmother        SOCIAL HISTORY  Social History     Tobacco Use   • Smoking status: Current Every Day Smoker     Packs/day: 0.25     Types: Cigarettes, Cigars     Last attempt to quit: 2020     Years since quittin.8   • Smokeless tobacco: Never Used   • Tobacco comment: 3 cigarettes a day   Substance and Sexual Activity   • Alcohol use: Not Currently   • Drug use: Yes     Types: Inhaled     Comment: marijuana   • Sexual activity: Not on file       SURGICAL HISTORY   has a past surgical history that includes eye surgery; thyroid lobectomy; and other.    CURRENT MEDICATIONS  See MAR, extensive medication list.    ALLERGIES  Allergies   Allergen Reactions   • Geodon [Ziprasidone Hcl] Anaphylaxis     Anaphylaxis per patient   • Abilify      \"Feeling tired, like I don't even know whats going on around me\"   • Fish      Pt reports fish causes him to be sick to his stomach  Not listed on MAR noted 2/3/2021         PHYSICAL EXAM  VITAL SIGNS: /78   Pulse 85   Temp 36.9 °C (98.4 °F) (Temporal)   Resp 17   Ht 1.981 m (6' 6\")   Wt (!) 129 kg (285 lb 7.9 oz)   SpO2 98%   BMI 32.99 kg/m²    Pulse ox interpretation: On room air, I interpret this pulse ox as normal.  Constitutional: Chronically ill appearing for age sitting up in mild distress.  HEENT: Normocephalic, atraumatic. Posterior pharynx clear, mucous membranes dry.  Eyes:  EOMI. Normal sclerae.  Neck: Supple, nontender.  Chest/Pulmonary: Slightly diminished to ausculation bilaterally, no wheezes or rhonchi.  Cardiovascular: Regular rate and " rhythm, no obvious murmur.  Symmetric radial pulses.  Abdomen: Soft, nontender; no rebound, guarding, or masses.  Back: No CVA or midline tenderness.   Musculoskeletal: No deformity or edema.  Neuro: Alert, no focal weakness or asymmetry.  Psych: Flat affect but cooperative.  Skin: No rashes, warm and dry.      DIAGNOSTIC STUDIES / PROCEDURES    LABS & EKG  Results for orders placed or performed during the hospital encounter of 04/15/21   EKG   Result Value Ref Range    Report       [facility] Emergency Dept.    Test Date:  2021-04-15  Pt Name:    HOLLY KIM                 Department: ER  MRN:        [id number]                      [place]:       [facility] [place] [facility] [place]  Gender:     Male                         Technician: [id number]  :        1980                   Requested By:SAMIR GARNICA  Order #:    [id number]                    Reading MD: Samir Garnica MD    Measurements  Intervals                                Axis  Rate:       77                           P:          42  KY:         176                          QRS:        6  QRSD:       114                          T:          23  QT:         368  QTc:        417    Interpretive Statements  SINUS RHYTHM  NONSPECIFIC INTRAVENTRICULAR CONDUCTION DELAY  BORDERLINE LOW VOLTAGE IN FRONTAL LEADS  Compared to ECG 2021 17:10:02  No significant changes  Stable EKG no STEMI  Electronically Signed On 2021 0:19:30 PDT by Samir Garnica MD         RADIOLOGY  DX-SHOULDER 2+ LEFT   Final Result         1.  No acute traumatic bony injury.             COURSE & MEDICAL DECISION MAKING    This is a 40 y.o. male who presents with left shoulder pain, had an injury a month ago worse today after exerting himself fishing.    Differential Diagnosis includes but is not limited to:  Sprain, AC separation, fracture, dislocation, rotator cuff injury, less likely ACS mimic    ED Course:  40-year-old male with recurrent left shoulder pain.  Had an injury last  month.  Plan x-ray, ketorolac for pain control, and reevaluation.  EKG to make sure this is not an ACS mimic.    Thankfully EKG stable.  X-ray shows nothing acute.  Plan sling, NSAIDs, follow-up with orthopedics in 1 week, return if any worse.    Medications   ketorolac (TORADOL) injection 30 mg (30 mg Intramuscular Given 4/15/21 2044)       FINAL IMPRESSION  1. Acute pain of left shoulder    2. Type 2 diabetes mellitus without complication, without long-term current use of insulin ([facility])    3. Psychiatric problem    4. PTSD (post-traumatic stress disorder)        PRESCRIPTIONS  Discharge Medication List as of 4/15/2021  9:37 PM      START taking these medications    Details   ibuprofen (MOTRIN) 400 MG Tab Take 1 tablet by mouth every 8 hours as needed for Moderate Pain or Inflammation., Disp-20 tablet, R-0, Print Rx Paper             FOLLOW UP  [facility], Emergency Dept  [address] [address] [phone]  Today  If you have ANY new or worse symptoms!    Krystin Gupta A.P.R.N.  [address]  [facility] [id number] [phone]    In 2 days      Shree Blevins M.D.  [address] [facility] [id number] [phone]    Schedule an appointment as soon as possible for a visit in 3 days  for recheck with orthopedics      -DISCHARGE-       Results, exam findings, clinical impression, presumed diagnosis, treatment options, and strict return precautions were discussed with the patient, and they verbalized understanding, agreed with, and appreciated the plan of care.    Pertinent EKG & Imaging studies reviewed and verified by myself, as well as nursing notes and the patient's past medical, family, and social histories (See chart for details).    Portions of this record were made with voice recognition software.  Despite my review, spelling/grammar/context errors may still remain.  Interpretation of this chart should be taken in this context.    Electronically signed by  Samir Aguilar M.D. on 4/16/2021 at 12:22 AM.       No. HEBER screening performed.  STOP BANG Legend: 0-2 = LOW Risk; 3-4 = INTERMEDIATE Risk; 5-8 = HIGH Risk

## 2021-04-16 NOTE — ED TRIAGE NOTES
"Norm Prabhakar   40 y.o. male   Chief Complaint   Patient presents with   • Shoulder Pain     left shoulder, says he was trying to clean his room when the pain started, unable to lft heavy weight, +CMS and full ROM noted      Pt amb to triage with steady gait for above complaint.      Pt is alert and oriented, speaking in full sentences, follows commands and responds appropriately to questions. Resp are even and unlabored.   Pt placed in lobby. Pt educated on triage process. Pt encouraged to alert staff for any changes.    /82   Pulse 85   Temp 36.4 °C (97.5 °F) (Temporal)   Resp 14   Ht 1.981 m (6' 6\")   Wt (!) 129 kg (285 lb 7.9 oz)   SpO2 98%   BMI 32.99 kg/m²     "

## 2021-05-06 ENCOUNTER — APPOINTMENT (OUTPATIENT)
Dept: RADIOLOGY | Facility: MEDICAL CENTER | Age: 41
End: 2021-05-06
Attending: EMERGENCY MEDICINE
Payer: MEDICAID

## 2021-05-06 ENCOUNTER — HOSPITAL ENCOUNTER (EMERGENCY)
Facility: MEDICAL CENTER | Age: 41
End: 2021-05-06
Attending: EMERGENCY MEDICINE
Payer: MEDICAID

## 2021-05-06 VITALS
TEMPERATURE: 96.9 F | HEART RATE: 80 BPM | HEIGHT: 78 IN | DIASTOLIC BLOOD PRESSURE: 66 MMHG | RESPIRATION RATE: 18 BRPM | BODY MASS INDEX: 32.99 KG/M2 | OXYGEN SATURATION: 94 % | SYSTOLIC BLOOD PRESSURE: 120 MMHG

## 2021-05-06 DIAGNOSIS — S60.212A CONTUSION OF LEFT WRIST, INITIAL ENCOUNTER: ICD-10-CM

## 2021-05-06 PROCEDURE — 73110 X-RAY EXAM OF WRIST: CPT | Mod: LT

## 2021-05-06 PROCEDURE — 700102 HCHG RX REV CODE 250 W/ 637 OVERRIDE(OP): Performed by: EMERGENCY MEDICINE

## 2021-05-06 PROCEDURE — 99283 EMERGENCY DEPT VISIT LOW MDM: CPT

## 2021-05-06 PROCEDURE — A9270 NON-COVERED ITEM OR SERVICE: HCPCS | Performed by: EMERGENCY MEDICINE

## 2021-05-06 RX ORDER — IBUPROFEN 600 MG/1
600 TABLET ORAL ONCE
Status: COMPLETED | OUTPATIENT
Start: 2021-05-06 | End: 2021-05-06

## 2021-05-06 RX ORDER — ACETAMINOPHEN 325 MG/1
975 TABLET ORAL ONCE
Status: COMPLETED | OUTPATIENT
Start: 2021-05-06 | End: 2021-05-06

## 2021-05-06 RX ADMIN — ACETAMINOPHEN 975 MG: 325 TABLET, FILM COATED ORAL at 15:26

## 2021-05-06 RX ADMIN — IBUPROFEN 600 MG: 600 TABLET ORAL at 15:26

## 2021-05-06 ASSESSMENT — PAIN DESCRIPTION - DESCRIPTORS: DESCRIPTORS: TENDER

## 2021-05-06 NOTE — ED NOTES
Pt awake, sitting upright speaking without signs of distress. States understanding of discharge instructions.

## 2021-05-06 NOTE — ED PROVIDER NOTES
ED Provider Note    CHIEF COMPLAINT  Chief Complaint   Patient presents with   • Wrist Injury     LEFT       HPI  Norm Prabhakar is a 40 y.o. male who presents after being hit with a softball in the dorsum of the left wrist last night.  He has pain and swelling of the left wrist, he denies any hand pain or elbow pain.  He has not taken anything for pain today.  Pain is worse if he palpates the dorsum of the left wrist or if he tries to move the wrist.    REVIEW OF SYSTEMS  See HPI for further details. All other systems are negative.     PAST MEDICAL HISTORY   has a past medical history of Anxiety, ASTHMA, Bipolar 1 disorder ([facility]), Depression, Diabetes ([facility]), Fall, Glaucoma, Glaucoma (), Hypothyroidism, Indigestion, Mental disorder, Murmur, Pneumonia, Psychiatric problem (), S/P thyroidectomy, Seizure ([facility]) (), Seizure disorder ([facility]), and Unspecified disorder of thyroid.    SOCIAL HISTORY  Social History     Tobacco Use   • Smoking status: Former Smoker     Packs/day: 0.25     Types: Cigarettes, Cigars     Quit date: 2020     Years since quittin.0   • Smokeless tobacco: Never Used   • Tobacco comment: 3 cigarettes a day   Substance and Sexual Activity   • Alcohol use: Not Currently   • Drug use: Yes     Types: Inhaled     Comment: marijuana   • Sexual activity: Not on file       SURGICAL HISTORY   has a past surgical history that includes eye surgery; thyroid lobectomy; and other.    CURRENT MEDICATIONS  Home Medications     Reviewed by Adan Thomas R.N. (Registered Nurse) on 21 at 1433  Med List Status: Partial   Medication Last Dose Status   acetaminophen (TYLENOL) 500 MG Tab  Active   albuterol 108 (90 Base) MCG/ACT Aero Soln inhalation aerosol  Active   aspirin EC (ECOTRIN) 81 MG Tablet Delayed Response  Active   atorvastatin (LIPITOR) 80 MG tablet  Active   budesonide-formoterol (SYMBICORT) 160-4.5 MCG/ACT Aerosol  Active   Cholecalciferol (VITAMIN D3) 2000 UNIT Cap  Active  "  divalproex (DEPAKOTE) 500 MG Tablet Delayed Response  Active   divalproex (DEPAKOTE) 500 MG Tablet Delayed Response  Active   Empagliflozin (JARDIANCE) 25 MG Tab  Active   ibuprofen (MOTRIN) 400 MG Tab  Active   insulin glargine (BASAGLAR KWIKPEN) 100 UNIT/ML injection PEN  Active   levothyroxine (SYNTHROID) 200 MCG Tab  Active   lisinopril (PRINIVIL) 10 MG Tab  Active   lurasidone (LATUDA) 20 MG Tab  Active   montelukast (SINGULAIR) 10 MG Tab  Active   Omega-3 Fatty Acids (FISH OIL) 1000 MG Cap capsule  Active   omeprazole (PRILOSEC) 20 MG delayed-release capsule  Active   ondansetron (ZOFRAN ODT) 4 MG TABLET DISPERSIBLE  Active   prazosin (MINIPRESS) 2 MG Cap  Active   sertraline (ZOLOFT) 100 MG Tab  Active                ALLERGIES  Allergies   Allergen Reactions   • Geodon [Ziprasidone Hcl] Anaphylaxis     Anaphylaxis per patient   • Abilify      \"Feeling tired, like I don't even know whats going on around me\"   • Fish      Pt reports fish causes him to be sick to his stomach  Not listed on MAR noted 2/3/2021         PHYSICAL EXAM  VITAL SIGNS: /69   Pulse (!) 104   Temp 36.1 °C (96.9 °F) (Oral)   Resp 18   Ht 1.981 m (6' 6\")   SpO2 94%   BMI 32.99 kg/m²  @NGHIA[[id number]::@   Pulse ox interpretation: I interpret this pulse ox as normal.  Constitutional: Alert in no apparent distress.  HENT: No signs of trauma, Bilateral external ears normal, Nose normal.   Eyes: Pupils are equal and reactive, Conjunctiva normal, Non-icteric.   Neck: Normal range of motion, No tenderness, Supple, No stridor.   Lymphatic: No lymphadenopathy noted.   Cardiovascular: Regular rate and rhythm, no murmurs.   Thorax & Lungs: Normal breath sounds, No respiratory distress, No wheezing, No chest tenderness.   Abdomen: Bowel sounds normal, Soft, No tenderness, No masses, No pulsatile masses. No peritoneal signs.  Skin: Warm, Dry, No erythema, No rash.   Back: No bony tenderness, No CVA tenderness.   Extremities: Intact distal " pulses, tenderness of the dorsum of the left wrist, pain with range of motion of the wrist, no snuffbox tenderness.  Musculoskeletal: Good range of motion in all major joints. No tenderness to palpation or major deformities noted.   Neurologic: Alert , Normal motor function, Normal sensory function, No focal deficits noted.   Psychiatric: Affect normal, Judgment normal, Mood normal.       DIAGNOSTIC STUDIES / PROCEDURES          RADIOLOGY  DX-WRIST-COMPLETE 3+ LEFT   Final Result      No acute osseous abnormality.              COURSE & MEDICAL DECISION MAKING  Pertinent Labs & Imaging studies reviewed. (See chart for details)    Differential diagnosis: Left wrist contusion, left wrist fracture    X-ray was ordered, the patient was given Tylenol and Motrin p.o.    Patient's x-ray is negative.  He is discharged to return for any worsening symptoms.     The patient will return for new or worsening symptoms and is stable at the time of discharge.    The patient is referred to a primary physician for blood pressure management, diabetic screening, and for all other preventative health concerns.      DISPOSITION:  Patient will be discharged home in stable condition.    FOLLOW UP:  [facility], Emergency Dept  [address] [place] [id number] [phone]    If symptoms worsen      OUTPATIENT MEDICATIONS:  New Prescriptions    No medications on file      The patient will return for worsening symptoms and is stable at the time of discharge. The patient verbalizes understanding and will comply.    FINAL IMPRESSION  1. Contusion of left wrist, initial encounter                Electronically signed by: Addy Rivera M.D., 5/6/2021 3:24 PM

## 2021-05-06 NOTE — DISCHARGE INSTRUCTIONS
Contusion  A contusion is a deep bruise. Contusions are the result of a blunt injury to tissues and muscle fibers under the skin. The injury causes bleeding under the skin. The skin overlying the contusion may turn blue, purple, or yellow. Minor injuries will give you a painless contusion, but more severe injuries cause contusions that may stay painful and swollen for a few weeks.  Follow these instructions at home:  Pay attention to any changes in your symptoms. Let your health care provider know about them. Take these actions to relieve your pain.  Managing pain, stiffness, and swelling    · Use resting, icing, applying pressure (compression), and raising (elevating) the injured area. This is often called the RICE strategy.  ? Rest the injured area. Return to your normal activities as told by your health care provider. Ask your health care provider what activities are safe for you.  ? If directed, put ice on the injured area:  § Put ice in a plastic bag.  § Place a towel between your skin and the bag.  § Leave the ice on for 20 minutes, 2-3 times per day.  ? If directed, apply light compression to the injured area using an elastic bandage. Make sure the bandage is not wrapped too tightly. Remove and reapply the bandage as directed by your health care provider.  ? If possible, raise (elevate) the injured area above the level of your heart while you are sitting or lying down.  General instructions  · Take over-the-counter and prescription medicines only as told by your health care provider.  · Keep all follow-up visits as told by your health care provider. This is important.  Contact a health care provider if:  · Your symptoms do not improve after several days of treatment.  · Your symptoms get worse.  · You have difficulty moving the injured area.  Get help right away if:  · You have severe pain.  · You have numbness in a hand or foot.  · Your hand or foot turns pale or cold.  Summary  · A contusion is a deep  bruise.  · Contusions are the result of a blunt injury to tissues and muscle fibers under the skin.  · It is treated with rest, ice, compression, and elevation. You may be given over-the-counter medicines for pain.  · Contact a health care provider if your symptoms do not improve, or get worse.  · Get help right away if you have severe pain, have numbness, or the area turns pale or cold.  This information is not intended to replace advice given to you by your health care provider. Make sure you discuss any questions you have with your health care provider.  Document Released: 09/27/2006 Document Revised: 08/08/2019 Document Reviewed: 08/08/2019  Elsevier Patient Education © 2020 [employer]

## 2021-05-16 ENCOUNTER — HOSPITAL ENCOUNTER (EMERGENCY)
Facility: MEDICAL CENTER | Age: 41
End: 2021-05-16
Attending: EMERGENCY MEDICINE
Payer: MEDICAID

## 2021-05-16 VITALS
TEMPERATURE: 98.7 F | DIASTOLIC BLOOD PRESSURE: 55 MMHG | SYSTOLIC BLOOD PRESSURE: 102 MMHG | WEIGHT: 296.3 LBS | RESPIRATION RATE: 15 BRPM | HEIGHT: 78 IN | OXYGEN SATURATION: 96 % | BODY MASS INDEX: 34.28 KG/M2 | HEART RATE: 74 BPM

## 2021-05-16 DIAGNOSIS — R73.9 HYPERGLYCEMIA: ICD-10-CM

## 2021-05-16 LAB
ALBUMIN SERPL BCP-MCNC: 4.3 G/DL (ref 3.2–4.9)
ALBUMIN/GLOB SERPL: 2 G/DL
ALP SERPL-CCNC: 68 U/L (ref 30–99)
ALT SERPL-CCNC: 10 U/L (ref 2–50)
ANION GAP SERPL CALC-SCNC: 17 MMOL/L (ref 7–16)
ANISOCYTOSIS BLD QL SMEAR: ABNORMAL
APPEARANCE UR: CLEAR
AST SERPL-CCNC: 11 U/L (ref 12–45)
BASE EXCESS BLDV CALC-SCNC: -4 MMOL/L
BASOPHILS # BLD AUTO: 0.9 % (ref 0–1.8)
BASOPHILS # BLD: 0.08 K/UL (ref 0–0.12)
BILIRUB SERPL-MCNC: 0.2 MG/DL (ref 0.1–1.5)
BILIRUB UR QL STRIP.AUTO: NEGATIVE
BODY TEMPERATURE: ABNORMAL CENTIGRADE
BUN SERPL-MCNC: 11 MG/DL (ref 8–22)
BURR CELLS BLD QL SMEAR: NORMAL
CALCIUM SERPL-MCNC: 8.8 MG/DL (ref 8.5–10.5)
CHLORIDE SERPL-SCNC: 95 MMOL/L (ref 96–112)
CO2 SERPL-SCNC: 19 MMOL/L (ref 20–33)
COLOR UR: YELLOW
CREAT SERPL-MCNC: 1.02 MG/DL (ref 0.5–1.4)
EOSINOPHIL # BLD AUTO: 0.37 K/UL (ref 0–0.51)
EOSINOPHIL NFR BLD: 4.4 % (ref 0–6.9)
ERYTHROCYTE [DISTWIDTH] IN BLOOD BY AUTOMATED COUNT: 45.3 FL (ref 35.9–50)
GLOBULIN SER CALC-MCNC: 2.1 G/DL (ref 1.9–3.5)
GLUCOSE BLD-MCNC: 331 MG/DL (ref 65–99)
GLUCOSE BLD-MCNC: 386 MG/DL (ref 65–99)
GLUCOSE BLD-MCNC: 563 MG/DL (ref 65–99)
GLUCOSE SERPL-MCNC: 628 MG/DL (ref 65–99)
GLUCOSE UR STRIP.AUTO-MCNC: >=1000 MG/DL
HCO3 BLDV-SCNC: 20 MMOL/L (ref 24–28)
HCT VFR BLD AUTO: 39.1 % (ref 42–52)
HGB BLD-MCNC: 12.5 G/DL (ref 14–18)
KETONES UR STRIP.AUTO-MCNC: NEGATIVE MG/DL
LEUKOCYTE ESTERASE UR QL STRIP.AUTO: NEGATIVE
LYMPHOCYTES # BLD AUTO: 2.55 K/UL (ref 1–4.8)
LYMPHOCYTES NFR BLD: 30.4 % (ref 22–41)
MACROCYTES BLD QL SMEAR: ABNORMAL
MANUAL DIFF BLD: NORMAL
MCH RBC QN AUTO: 27.5 PG (ref 27–33)
MCHC RBC AUTO-ENTMCNC: 32 G/DL (ref 33.7–35.3)
MCV RBC AUTO: 86.1 FL (ref 81.4–97.8)
MICRO URNS: ABNORMAL
MICROCYTES BLD QL SMEAR: ABNORMAL
MONOCYTES # BLD AUTO: 0.87 K/UL (ref 0–0.85)
MONOCYTES NFR BLD AUTO: 10.4 % (ref 0–13.4)
MORPHOLOGY BLD-IMP: NORMAL
NEUTROPHILS # BLD AUTO: 4.53 K/UL (ref 1.82–7.42)
NEUTROPHILS NFR BLD: 53 % (ref 44–72)
NEUTS BAND NFR BLD MANUAL: 0.9 % (ref 0–10)
NITRITE UR QL STRIP.AUTO: NEGATIVE
NRBC # BLD AUTO: 0 K/UL
NRBC BLD-RTO: 0 /100 WBC
OVALOCYTES BLD QL SMEAR: NORMAL
PCO2 BLDV: 33.8 MMHG (ref 41–51)
PH BLDV: 7.39 [PH] (ref 7.31–7.45)
PH UR STRIP.AUTO: 5 [PH] (ref 5–8)
PLATELET # BLD AUTO: 250 K/UL (ref 164–446)
PLATELET BLD QL SMEAR: NORMAL
PMV BLD AUTO: 10.3 FL (ref 9–12.9)
PO2 BLDV: 35.9 MMHG (ref 25–40)
POLYCHROMASIA BLD QL SMEAR: NORMAL
POTASSIUM SERPL-SCNC: 4 MMOL/L (ref 3.6–5.5)
PROT SERPL-MCNC: 6.4 G/DL (ref 6–8.2)
PROT UR QL STRIP: NEGATIVE MG/DL
RBC # BLD AUTO: 4.54 M/UL (ref 4.7–6.1)
RBC BLD AUTO: PRESENT
RBC UR QL AUTO: NEGATIVE
SAO2 % BLDV: 65.6 %
SODIUM SERPL-SCNC: 131 MMOL/L (ref 135–145)
SP GR UR STRIP.AUTO: 1.03
UROBILINOGEN UR STRIP.AUTO-MCNC: 0.2 MG/DL
WBC # BLD AUTO: 8.4 K/UL (ref 4.8–10.8)

## 2021-05-16 PROCEDURE — 96372 THER/PROPH/DIAG INJ SC/IM: CPT

## 2021-05-16 PROCEDURE — 82962 GLUCOSE BLOOD TEST: CPT

## 2021-05-16 PROCEDURE — 700105 HCHG RX REV CODE 258: Performed by: EMERGENCY MEDICINE

## 2021-05-16 PROCEDURE — 85007 BL SMEAR W/DIFF WBC COUNT: CPT

## 2021-05-16 PROCEDURE — 99284 EMERGENCY DEPT VISIT MOD MDM: CPT

## 2021-05-16 PROCEDURE — 82803 BLOOD GASES ANY COMBINATION: CPT

## 2021-05-16 PROCEDURE — 81003 URINALYSIS AUTO W/O SCOPE: CPT

## 2021-05-16 PROCEDURE — 85027 COMPLETE CBC AUTOMATED: CPT

## 2021-05-16 PROCEDURE — 80053 COMPREHEN METABOLIC PANEL: CPT

## 2021-05-16 PROCEDURE — 700102 HCHG RX REV CODE 250 W/ 637 OVERRIDE(OP): Performed by: EMERGENCY MEDICINE

## 2021-05-16 RX ORDER — SODIUM CHLORIDE 9 MG/ML
1000 INJECTION, SOLUTION INTRAVENOUS ONCE
Status: COMPLETED | OUTPATIENT
Start: 2021-05-16 | End: 2021-05-16

## 2021-05-16 RX ADMIN — SODIUM CHLORIDE 1000 ML: 9 INJECTION, SOLUTION INTRAVENOUS at 20:45

## 2021-05-16 RX ADMIN — INSULIN HUMAN 10 UNITS: 100 INJECTION, SOLUTION PARENTERAL at 22:14

## 2021-05-16 ASSESSMENT — FIBROSIS 4 INDEX: FIB4 SCORE: 0.74

## 2021-05-17 NOTE — ED NOTES
DC home with written and verbal instructions regarding f/u, activity and home med compliance.  Verbalized understanding, ambulated out with all belongings.

## 2021-05-17 NOTE — ED PROVIDER NOTES
"ER Provider Note     Scribed for Shree Harvey M.D. by Chuck Dawson. 2021, 8:32 PM.    Primary Care Provider: ANIKET Davis  Means of Arrival: Walk-in   History obtained from: Patient  History limited by: None     CHIEF COMPLAINT  Chief Complaint   Patient presents with    High Blood Sugar     Pt states blood sugar is over 500. RN re-check FSBS 563. States to feel \"sluggish.\"       HPI  Norm Prabhakar is a 40 y.o. male with a history of Diabetes who presents to the Emergency Department for hyperglycemia with acute, mild fatigue. Patient reports that he checked his blood sugar earlier tonight and found it to be 500 and decided to come in. He states that he has been taking all his medications, but did not take his insulin prior to coming here. Patient reports feeling somewhat \"sluggish\" but denies any new fevers, cough, vomiting, nausea, or headaches.     REVIEW OF SYSTEMS  See HPI for further details. All other systems are negative.     PAST MEDICAL HISTORY   has a past medical history of Anxiety, ASTHMA, Bipolar 1 disorder ([facility]), Depression, Diabetes ([facility]), Fall, Glaucoma, Glaucoma (), Hypothyroidism, Indigestion, Mental disorder, Murmur, Pneumonia, Psychiatric problem (), S/P thyroidectomy, Seizure ([facility]) (), Seizure disorder ([facility]), and Unspecified disorder of thyroid.    SURGICAL HISTORY   has a past surgical history that includes eye surgery; thyroid lobectomy; and other.    SOCIAL HISTORY  Social History     Tobacco Use    Smoking status: Former Smoker     Packs/day: 0.25     Types: Cigarettes, Cigars     Quit date: 2020     Years since quittin.1    Smokeless tobacco: Never Used    Tobacco comment: 3 cigarettes a day   Vaping Use    Vaping Use: Former   Substance Use Topics    Alcohol use: Not Currently    Drug use: Yes     Types: Inhaled     Comment: marijuana      Social History     Substance and Sexual Activity   Drug Use Yes    Types: Inhaled    Comment: marijuana " "      FAMILY HISTORY  Family History   Problem Relation Age of Onset    Hypertension Mother     Heart Disease Mother     Lung Disease Mother     Stroke Maternal Grandmother        CURRENT MEDICATIONS  Home Medications       Reviewed by Jane Gamble R.N. (Registered Nurse) on 05/16/21 at 2005  Med List Status: Partial     Medication Last Dose Status   acetaminophen (TYLENOL) 500 MG Tab  Active   albuterol 108 (90 Base) MCG/ACT Aero Soln inhalation aerosol  Active   aspirin EC (ECOTRIN) 81 MG Tablet Delayed Response  Active   atorvastatin (LIPITOR) 80 MG tablet  Active   budesonide-formoterol (SYMBICORT) 160-4.5 MCG/ACT Aerosol  Active   Cholecalciferol (VITAMIN D3) 2000 UNIT Cap  Active   divalproex (DEPAKOTE) 500 MG Tablet Delayed Response  Active   divalproex (DEPAKOTE) 500 MG Tablet Delayed Response  Active   Empagliflozin (JARDIANCE) 25 MG Tab  Active   ibuprofen (MOTRIN) 400 MG Tab  Active   insulin glargine (BASAGLAR KWIKPEN) 100 UNIT/ML injection PEN  Active   levothyroxine (SYNTHROID) 200 MCG Tab  Active   lisinopril (PRINIVIL) 10 MG Tab  Active   lurasidone (LATUDA) 20 MG Tab  Active   montelukast (SINGULAIR) 10 MG Tab  Active   Omega-3 Fatty Acids (FISH OIL) 1000 MG Cap capsule  Active   omeprazole (PRILOSEC) 20 MG delayed-release capsule  Active   ondansetron (ZOFRAN ODT) 4 MG TABLET DISPERSIBLE  Active   prazosin (MINIPRESS) 2 MG Cap  Active   sertraline (ZOLOFT) 100 MG Tab  Active                    ALLERGIES  Allergies   Allergen Reactions    Geodon [Ziprasidone Hcl] Anaphylaxis     Anaphylaxis per patient    Abilify      \"Feeling tired, like I don't even know whats going on around me\"    Fish      Pt reports fish causes him to be sick to his stomach  Not listed on MAR noted 2/3/2021         PHYSICAL EXAM  VITAL SIGNS: /89   Pulse 96   Temp 36.4 °C (97.5 °F) (Oral)   Resp 16   Ht 1.981 m (6' 6\")   Wt (!) 134 kg (296 lb 4.8 oz)   SpO2 100%   BMI 34.24 kg/m²    Constitutional: Alert " in no apparent distress.  HENT: Dry mucous membranes. No signs of trauma, Bilateral external ears normal, Nose normal.   Eyes: Left eye with opacified cornea and eye movement toward the left, is chronic and does not appear to be changes. Right pupil is reactive, Conjunctiva normal, Non-icteric.   Neck: Normal range of motion, No tenderness, Supple, No stridor.   Lymphatic: No lymphadenopathy noted.   Cardiovascular: Regular rate and rhythm, no palpable thrill  Thorax & Lungs: No respiratory distress,  No chest tenderness.   Abdomen: Bowel sounds normal, Soft, No tenderness, No masses, No pulsatile masses. No peritoneal signs.  Skin: Warm, Dry, No erythema, No rash.   Back: No bony tenderness, No CVA tenderness.   Extremities: Intact distal pulses, No edema, No tenderness, No cyanosis.  Musculoskeletal: Good range of motion in all major joints. No tenderness to palpation or major deformities noted.   Neurologic: Alert , Normal motor function, Normal sensory function, No focal deficits noted.   Psychiatric: Affect normal, Judgment normal, Mood normal.     DIAGNOSTIC STUDIES / PROCEDURES    LABS  Labs Reviewed   CBC WITH DIFFERENTIAL - Abnormal; Notable for the following components:       Result Value    RBC 4.54 (*)     Hemoglobin 12.5 (*)     Hematocrit 39.1 (*)     MCHC 32.0 (*)     Monos (Absolute) 0.87 (*)     Microcytosis 2+ (*)     All other components within normal limits   COMP METABOLIC PANEL - Abnormal; Notable for the following components:    Sodium 131 (*)     Chloride 95 (*)     Co2 19 (*)     Anion Gap 17.0 (*)     Glucose 628 (*)     AST(SGOT) 11 (*)     All other components within normal limits   URINALYSIS - Abnormal; Notable for the following components:    Glucose >=1000 (*)     All other components within normal limits   VENOUS BLOOD GAS - Abnormal; Notable for the following components:    Venous Bg Pco2 33.8 (*)     Venous Bg Hco3 20 (*)     All other components within normal limits   POCT GLUCOSE  DEVICE RESULTS - Abnormal; Notable for the following components:    Glucose - Accu-Ck 563 (*)     All other components within normal limits   POCT GLUCOSE DEVICE RESULTS - Abnormal; Notable for the following components:    Glucose - Accu-Ck 386 (*)     All other components within normal limits   PLATELET ESTIMATE   MORPHOLOGY   PERIPHERAL SMEAR REVIEW   DIFFERENTIAL MANUAL   ESTIMATED GFR   POCT GLUCOSE   POCT URINALYSIS     All labs reviewed by me.    COURSE & MEDICAL DECISION MAKING  Pertinent Labs & Imaging studies reviewed. (See chart for details)    This is a 40 y.o. male that presents with a significant elevated blood sugar.  At this time we will evaluate the patient for DKA as well as urinary tract infection.  In addition I will evaluate the patient for anemia..     8:32 PM - Patient seen and examined at bedside. Ordered POCT UA, CBC w/ diff, and CMP.      9:54 PM - Patient treated with Insulin. UA and VBG ordered.     10:56 PM - Patient was reevaluated at bedside. Discussed lab results with the patient and informed them they are reassuring. Return precautions were discussed with the patient, and they were cleared for discharge at this time. Patient was understanding and agreeable to discharge.    HYDRATION: Based on the patient's presentation of Dehydration the patient was given IV fluids. IV Hydration was used because oral hydration was not adequate alone. Upon recheck following hydration, the patient was the patient is improved..      The patient will return for new or worsening symptoms and is stable at the time of discharge.    Patient has no DKA.  The patient has a normal pH.  The patient has a mild pseudohyponatremia.  The glucose is coming down after insulin as well as fluids.  Will discharge patient home with strict return precautions and follow-up.    The patient is referred to a primary physician for blood pressure management, diabetic screening, and for all other preventative health  concerns.    DISPOSITION:  Patient will be discharged home in stable condition.    FOLLOW UP:  ANIKET Davis  [address]  [address]  [facility] [id number] [phone]    In 2 days        OUTPATIENT MEDICATIONS:  New Prescriptions    No medications on file       FINAL IMPRESSION  1. Hyperglycemia          Chuck ROB (Scribe), am scribing for, and in the presence of, Shree Harvey M.D..    Electronically signed by: Chuck Dawson (Scribe), 5/16/2021    Shree ROB M.D. personally performed the services described in this documentation, as scribed by Chuck Dawson in my presence, and it is both accurate and complete.     The note accurately reflects work and decisions made by me.  Shree Harvey M.D.  5/17/2021  2:22 AM

## 2021-05-17 NOTE — ED NOTES
Pt ambulatory to room 9. Agree with triage, pt states feels increased fatigue over the last few days, states BGL has been running higher than normal, states taking insulin regularly. States drinking water and sugar free Gatorades. Pt calm, resting, no distress noted. PIV started, labs collected, provided urinal. Call light within reach.

## 2021-05-17 NOTE — ED TRIAGE NOTES
"Chief Complaint   Patient presents with   • High Blood Sugar     Pt states blood sugar is over 500. RN re-check FSBS 563. States to feel \"sluggish.\"     Patient states today he checked his blood sugar, states it was over 500 and decided he should come in. Patient states he did not take any insulin before leaving his house. Patient states he has been feeling sluggish and drinking lots of water. RN re-check pt's FSBS 563. Protocol ordered. Hx of type 2 DM. GCS 15.    Pt is alert and oriented, speaking in full sentences, follows commands and responds appropriately to questions. NAD. Resp are even and unlabored.      Pt placed in lobby. Pt educated on triage process. Pt encouraged to alert staff for any changes.     Patient and staff wearing appropriate PPE    /89   Pulse 96   Temp 36.4 °C (97.5 °F) (Oral)   Resp 16   Ht 1.981 m (6' 6\")   Wt (!) 134 kg (296 lb 4.8 oz)   SpO2 100%   BMI 34.24 kg/m²   "

## 2021-06-07 ENCOUNTER — HOSPITAL ENCOUNTER (EMERGENCY)
Facility: MEDICAL CENTER | Age: 41
End: 2021-06-07
Attending: EMERGENCY MEDICINE
Payer: MEDICAID

## 2021-06-07 VITALS
HEART RATE: 78 BPM | BODY MASS INDEX: 34.03 KG/M2 | RESPIRATION RATE: 31 BRPM | DIASTOLIC BLOOD PRESSURE: 62 MMHG | TEMPERATURE: 97.4 F | OXYGEN SATURATION: 93 % | SYSTOLIC BLOOD PRESSURE: 126 MMHG | HEIGHT: 78 IN | WEIGHT: 294.09 LBS

## 2021-06-07 DIAGNOSIS — E03.9 HYPOTHYROIDISM, UNSPECIFIED TYPE: ICD-10-CM

## 2021-06-07 DIAGNOSIS — R55 SYNCOPE, UNSPECIFIED SYNCOPE TYPE: ICD-10-CM

## 2021-06-07 LAB
ALBUMIN SERPL BCP-MCNC: 4.5 G/DL (ref 3.2–4.9)
ALBUMIN/GLOB SERPL: 1.9 G/DL
ALP SERPL-CCNC: 60 U/L (ref 30–99)
ALT SERPL-CCNC: 10 U/L (ref 2–50)
ANION GAP SERPL CALC-SCNC: 15 MMOL/L (ref 7–16)
AST SERPL-CCNC: 13 U/L (ref 12–45)
BASOPHILS # BLD AUTO: 0.8 % (ref 0–1.8)
BASOPHILS # BLD: 0.09 K/UL (ref 0–0.12)
BILIRUB SERPL-MCNC: 0.2 MG/DL (ref 0.1–1.5)
BUN SERPL-MCNC: 16 MG/DL (ref 8–22)
CALCIUM SERPL-MCNC: 9.5 MG/DL (ref 8.5–10.5)
CHLORIDE SERPL-SCNC: 97 MMOL/L (ref 96–112)
CO2 SERPL-SCNC: 22 MMOL/L (ref 20–33)
CREAT SERPL-MCNC: 1.2 MG/DL (ref 0.5–1.4)
EKG IMPRESSION: NORMAL
EOSINOPHIL # BLD AUTO: 0.53 K/UL (ref 0–0.51)
EOSINOPHIL NFR BLD: 5 % (ref 0–6.9)
ERYTHROCYTE [DISTWIDTH] IN BLOOD BY AUTOMATED COUNT: 45.6 FL (ref 35.9–50)
GLOBULIN SER CALC-MCNC: 2.4 G/DL (ref 1.9–3.5)
GLUCOSE BLD-MCNC: 361 MG/DL (ref 65–99)
GLUCOSE SERPL-MCNC: 186 MG/DL (ref 65–99)
HCT VFR BLD AUTO: 39.9 % (ref 42–52)
HGB BLD-MCNC: 13.1 G/DL (ref 14–18)
IMM GRANULOCYTES # BLD AUTO: 0.19 K/UL (ref 0–0.11)
IMM GRANULOCYTES NFR BLD AUTO: 1.8 % (ref 0–0.9)
LYMPHOCYTES # BLD AUTO: 3.01 K/UL (ref 1–4.8)
LYMPHOCYTES NFR BLD: 28.3 % (ref 22–41)
MCH RBC QN AUTO: 27.8 PG (ref 27–33)
MCHC RBC AUTO-ENTMCNC: 32.8 G/DL (ref 33.7–35.3)
MCV RBC AUTO: 84.5 FL (ref 81.4–97.8)
MONOCYTES # BLD AUTO: 0.87 K/UL (ref 0–0.85)
MONOCYTES NFR BLD AUTO: 8.2 % (ref 0–13.4)
NEUTROPHILS # BLD AUTO: 5.95 K/UL (ref 1.82–7.42)
NEUTROPHILS NFR BLD: 55.9 % (ref 44–72)
NRBC # BLD AUTO: 0 K/UL
NRBC BLD-RTO: 0 /100 WBC
PLATELET # BLD AUTO: 300 K/UL (ref 164–446)
PMV BLD AUTO: 9.8 FL (ref 9–12.9)
POTASSIUM SERPL-SCNC: 4 MMOL/L (ref 3.6–5.5)
PROT SERPL-MCNC: 6.9 G/DL (ref 6–8.2)
RBC # BLD AUTO: 4.72 M/UL (ref 4.7–6.1)
SODIUM SERPL-SCNC: 134 MMOL/L (ref 135–145)
TROPONIN T SERPL-MCNC: 18 NG/L (ref 6–19)
TSH SERPL DL<=0.005 MIU/L-ACNC: 15.9 UIU/ML (ref 0.38–5.33)
WBC # BLD AUTO: 10.6 K/UL (ref 4.8–10.8)

## 2021-06-07 PROCEDURE — 82962 GLUCOSE BLOOD TEST: CPT

## 2021-06-07 PROCEDURE — 84484 ASSAY OF TROPONIN QUANT: CPT

## 2021-06-07 PROCEDURE — 93005 ELECTROCARDIOGRAM TRACING: CPT | Performed by: EMERGENCY MEDICINE

## 2021-06-07 PROCEDURE — 700102 HCHG RX REV CODE 250 W/ 637 OVERRIDE(OP): Performed by: EMERGENCY MEDICINE

## 2021-06-07 PROCEDURE — 85025 COMPLETE CBC W/AUTO DIFF WBC: CPT

## 2021-06-07 PROCEDURE — 700105 HCHG RX REV CODE 258: Performed by: EMERGENCY MEDICINE

## 2021-06-07 PROCEDURE — 99284 EMERGENCY DEPT VISIT MOD MDM: CPT

## 2021-06-07 PROCEDURE — 80053 COMPREHEN METABOLIC PANEL: CPT

## 2021-06-07 PROCEDURE — A9270 NON-COVERED ITEM OR SERVICE: HCPCS | Performed by: EMERGENCY MEDICINE

## 2021-06-07 PROCEDURE — 84443 ASSAY THYROID STIM HORMONE: CPT

## 2021-06-07 RX ORDER — ACETAMINOPHEN 500 MG
1000 TABLET ORAL ONCE
Status: COMPLETED | OUTPATIENT
Start: 2021-06-07 | End: 2021-06-07

## 2021-06-07 RX ORDER — SODIUM CHLORIDE 9 MG/ML
1000 INJECTION, SOLUTION INTRAVENOUS ONCE
Status: COMPLETED | OUTPATIENT
Start: 2021-06-07 | End: 2021-06-07

## 2021-06-07 RX ADMIN — SODIUM CHLORIDE 1000 ML: 9 INJECTION, SOLUTION INTRAVENOUS at 17:42

## 2021-06-07 RX ADMIN — ACETAMINOPHEN 1000 MG: 500 TABLET ORAL at 19:15

## 2021-06-07 ASSESSMENT — FIBROSIS 4 INDEX: FIB4 SCORE: 0.53

## 2021-06-07 NOTE — ED TRIAGE NOTES
"Chief Complaint   Patient presents with   • Syncope     Pt states he was walking to [place] and began feeling lightheaded. Pt states \"everything went black and he woke up sitting\". Pt then went to [facility] and grocery shopped and felt symptoms so he called EMS. Pt denies hitting his head. Pt states he drank a monster energy drink this AM. Per EMS, pts FBS was 408. Pt has a hx of diabetes and states he checks his sugar daily. Pt does not remember his FBS from this AM.      /65   Pulse 96   Temp 36.3 °C (97.4 °F) (Temporal)   Resp 16   Ht 1.981 m (6' 6\")   Wt (!) 133 kg (294 lb 1.5 oz)   SpO2 97%   BMI 33.99 kg/m²     Patient arrived to ED for the above complaint. .Triage process explained to patient. Patient placed in lobby and told to notify staff of any changes.   "
37.1

## 2021-06-07 NOTE — ED NOTES
Patient rounded on and had vitals checked. Patient had low blood pressure and reported being symptomatic of it with increased lightheadedness and dizziness. Triage RN aware of low blood pressure and change in symptoms.

## 2021-06-08 NOTE — ED PROVIDER NOTES
"ED Provider Note    CHIEF COMPLAINT  Chief Complaint   Patient presents with   • Syncope     Pt states he was walking to [facility] and began feeling lightheaded. Pt states \"everything went black and he woke up sitting\". Pt then went to [facility] and grocery shopped and felt symptoms so he called EMS. Pt denies hitting his head. Pt states he drank a monster energy drink this AM. Per EMS, pts FBS was 408. Pt has a hx of diabetes and states he checks his sugar daily. Pt does not remember his FBS from this AM.        HPI  Norm Prabhakar is a 40 y.o. male who presents for evaluation of syncope.  The patient states that he was at [facility] when he felt very lightheaded and then everything started to go black and the next he knows he is on the ground.  He finished shopping, while getting ready for the bus to come he had an episode similar to that although did not lose consciousness.  He has not had any chest pain or shortness of breath or abdominal or back pain.  No focal weakness numbness or tingling.  He does have a history of diabetes and states his glucose has been running high recently.  He also has a history of bipolar disorder as well as conversion disorder suspected by neurology during his most recent hospitalization.  Patient offers no other specific complaints at this time.    REVIEW OF SYSTEMS  Negative for fever, rash, chest pain, dyspnea, abdominal pain, vomiting, diarrhea, headache, focal weakness, focal numbness, focal tingling, back pain. All other systems are negative.     PAST MEDICAL HISTORY  Past Medical History:   Diagnosis Date   • Anxiety     BIPOLAR   • ASTHMA    • Bipolar 1 disorder (HCC)    • Depression    • Diabetes (HCC)     Type II Diabetes   • Fall     passed out 2 wks ago   • Glaucoma    • Glaucoma 1982    both eyes/ blind on left eye   • Hypothyroidism    • Indigestion     once in a while   • Mental disorder     learning disabilities; speech impairment; developmental delays   • Murmur     since " "birth   • Pneumonia     remote   • Psychiatric problem     PTSD   • S/P thyroidectomy    • Seizure (HCC)    • Seizure disorder (HCC)    • Unspecified disorder of thyroid        FAMILY HISTORY  Family History   Problem Relation Age of Onset   • Hypertension Mother    • Heart Disease Mother    • Lung Disease Mother    • Stroke Maternal Grandmother        SOCIAL HISTORY  Social History     Tobacco Use   • Smoking status: Former Smoker     Packs/day: 0.25     Types: Cigarettes, Cigars     Quit date: 2020     Years since quittin.1   • Smokeless tobacco: Never Used   • Tobacco comment: 3 cigarettes a day   Vaping Use   • Vaping Use: Former   Substance Use Topics   • Alcohol use: Not Currently   • Drug use: Yes     Types: Inhaled     Comment: marijuana       SURGICAL HISTORY  Past Surgical History:   Procedure Laterality Date   • EYE SURGERY     • OTHER      Hernia Repair when he was 10 yrs old   • THYROID LOBECTOMY         CURRENT MEDICATIONS  I personally reviewed the medication list in the charting documentation.     ALLERGIES  Allergies   Allergen Reactions   • Geodon [Ziprasidone Hcl] Anaphylaxis     Anaphylaxis per patient   • Abilify      \"Feeling tired, like I don't even know whats going on around me\"   • Fish      Pt reports fish causes him to be sick to his stomach  Not listed on MAR noted 2/3/2021         MEDICAL RECORD  I have reviewed patient's medical record and pertinent results are listed above.      PHYSICAL EXAM  VITAL SIGNS: /66   Pulse 78   Temp 36.3 °C (97.4 °F) (Temporal)   Resp (!) 31   Ht 1.981 m (6' 6\")   Wt (!) 133 kg (294 lb 1.5 oz)   SpO2 93%   BMI 33.99 kg/m²    Constitutional: Well appearing patient in no acute distress.  Awake and alert, not toxic nor ill in appearance.  HENT: Normocephalic, no obvious evidence of acute trauma.  Eyes: Chronically blind left eye, injected conjunctiva.   Neck: Comfortable movement without any obvious restriction in the range of " motion.  Cardiovascular: Upon ascultation I appreciate a regular heart rhythm and a normal rate.   Thorax & Lungs: Normal nonlabored respirations.  Upon application of the stethoscope for auscultation I find there to be no associated chest wall tenderness.  I appreciate no wheezing, rhonchi or rales. There is normal air movement.    Abdomen: The abdomen is not visibly distended. Upon palpation, I find it to be without tenderness.  No mass appreciated.  Skin: The exposed portions of skin reveal no obvious rash or other abnormalities.  Extremities/Musculoskeletal: No obvious sign of acute trauma. No asymmetric calf tenderness or edema.   Neurologic: Alert & oriented. No focal deficits observed.   Psychiatric: Normal affect appropriate for the clinical situation.    DIAGNOSTIC STUDIES / PROCEDURES    LABS/EKGs  Results for orders placed or performed during the hospital encounter of 06/07/21   CBC WITH DIFFERENTIAL   Result Value Ref Range    WBC 10.6 4.8 - 10.8 K/uL    RBC 4.72 4.70 - 6.10 M/uL    Hemoglobin 13.1 (L) 14.0 - 18.0 g/dL    Hematocrit 39.9 (L) 42.0 - 52.0 %    MCV 84.5 81.4 - 97.8 fL    MCH 27.8 27.0 - 33.0 pg    MCHC 32.8 (L) 33.7 - 35.3 g/dL    RDW 45.6 35.9 - 50.0 fL    Platelet Count 300 164 - 446 K/uL    MPV 9.8 9.0 - 12.9 fL    Neutrophils-Polys 55.90 44.00 - 72.00 %    Lymphocytes 28.30 22.00 - 41.00 %    Monocytes 8.20 0.00 - 13.40 %    Eosinophils 5.00 0.00 - 6.90 %    Basophils 0.80 0.00 - 1.80 %    Immature Granulocytes 1.80 (H) 0.00 - 0.90 %    Nucleated RBC 0.00 /100 WBC    Neutrophils (Absolute) 5.95 1.82 - 7.42 K/uL    Lymphs (Absolute) 3.01 1.00 - 4.80 K/uL    Monos (Absolute) 0.87 (H) 0.00 - 0.85 K/uL    Eos (Absolute) 0.53 (H) 0.00 - 0.51 K/uL    Baso (Absolute) 0.09 0.00 - 0.12 K/uL    Immature Granulocytes (abs) 0.19 (H) 0.00 - 0.11 K/uL    NRBC (Absolute) 0.00 K/uL   COMP METABOLIC PANEL   Result Value Ref Range    Sodium 134 (L) 135 - 145 mmol/L    Potassium 4.0 3.6 - 5.5 mmol/L     Chloride 97 96 - 112 mmol/L    Co2 22 20 - 33 mmol/L    Anion Gap 15.0 7.0 - 16.0    Glucose 186 (H) 65 - 99 mg/dL    Bun 16 8 - 22 mg/dL    Creatinine 1.20 0.50 - 1.40 mg/dL    Calcium 9.5 8.5 - 10.5 mg/dL    AST(SGOT) 13 12 - 45 U/L    ALT(SGPT) 10 2 - 50 U/L    Alkaline Phosphatase 60 30 - 99 U/L    Total Bilirubin 0.2 0.1 - 1.5 mg/dL    Albumin 4.5 3.2 - 4.9 g/dL    Total Protein 6.9 6.0 - 8.2 g/dL    Globulin 2.4 1.9 - 3.5 g/dL    A-G Ratio 1.9 g/dL   TROPONIN   Result Value Ref Range    Troponin T 18 6 - 19 ng/L   TSH   Result Value Ref Range    TSH 15.900 (H) 0.380 - 5.330 uIU/mL   ESTIMATED GFR   Result Value Ref Range    GFR If African American >60 >60 mL/min/1.73 m 2    GFR If Non African American >60 >60 mL/min/1.73 m 2   EKG (NOW)   Result Value Ref Range    Report       [facility] Emergency Dept.    Test Date:  2021  Pt Name:    HOLLY KIM                 Department: ER  MRN:        [id number]                      Room:  Gender:     Male                         Technician: [id number]  :        1980                   Requested By:ER TRIAGE PROTOCOL  Order #:    [id number]                    Reading MD: TEOFILO VÁZQUEZ MD    Measurements  Intervals                                Axis  Rate:       91                           P:          64  NM:         161                          QRS:        20  QRSD:       109                          T:          27  QT:         348  QTc:        428    Interpretive Statements  12 Lead EKG interpreted by me to show: -- Rate 91 -- Rhythm: Normal sinus  rhythm  -- Axis: Normal -- NM and QRS Intervals: Normal -- T waves: No acute changes  --  ST segments: No acute changes -- Ectopy: None. My impression of this EKG:  Does  not indicate acute ischemia at this time.  No significant change compar ed to  4/15/2021  Electronically Signed On 2021 17:12:42 PDT by TEOFILO VÁZQUEZ MD     POCT glucose device results   Result Value Ref Range     Glucose - Accu-Ck 361 (H) 65 - 99 mg/dL   Blood work is significant really only for an elevated TSH although    COURSE & MEDICAL DECISION MAKING  I have reviewed any medical record information, laboratory studies and radiographic results as noted above.  Differential diagnoses includes: Dehydration, electrolyte abnormalities, DKA, anemia, ACS, arrhythmia, orthostasis, thyroid dysregulation    Encounter Summary: This is a very pleasant 40 y.o. male who unfortunately required evaluation in the emergency department today with one syncopal episode and a second episode that was near syncope.  No other associated symptoms.  No other focal findings on exam.  Of note he is diabetic reports being hyperglycemic.  His EKG is unremarkable.  His blood pressure is soft, he is not tachycardic.  Will administer IV fluids.  Will check blood work and he will be reevaluated ------- this value is significantly improved from a couple months ago.  He did recently undergo an increase of his Synthroid.  I do not think that is the etiology of his current presentation, he appears well and at this point the patient is stable and appropriate to be discharged, strict return instructions have been provided.      DISPOSITION: Discharged home in stable condition      FINAL IMPRESSION  1. Syncope, unspecified syncope type    2. Hypothyroidism, unspecified type           This dictation was created using voice recognition software. The accuracy of the dictation is limited to the abilities of the software. I expect there may be some errors of grammar and possibly content. The nursing notes were reviewed and certain aspects of this information were incorporated into this note.    Electronically signed by: Florentino Redmond M.D., 6/7/2021 5:40 PM

## 2021-06-14 NOTE — PROGRESS NOTES
Ate well. Watching TV. Call light with in reach.    Subjective:      Orlando Arreguin is a 73 y.o. male who presents with Follow-Up (6 mo follow up per DM)            HPI     Here follow up diabetes checking blood sugars still checking blood sugar once a day mid day on lantus 12 units and metformin 1000 mg bid, hypothyroid followed by endocrinology.  No hypoglycemia.  Does an outstanding job checking and recording his blood sugars once a day usually between 1130 and 1:30 in the afternoon.  Blood sugars normally will run , over the last 2 to 3 weeks blood sugars have been running slightly higher 100-115 with no significant change in his nutrition or activity patterns.  Has been keeping active and practicing social distancing during the Covid pandemic.  Has received both Covid vaccinations without side effects.  Medications, allergies, medical history, surgical history, social history, family history  reviewed and updated  CAD status post coronary stenting, followed by cardiology.  Tries to keep active as much as possible but limited by chronic knee pain.  Peripheral vascular disease, denies calf pain claudication with activity during the day, was referred to vascular surgery but for some reason he ended up seeing [facility], and has not had follow-up with a vascular surgeon.  Gastroparesis stable on erythromycin.  Dyslipidemia on Lipitor 80 mg daily per cardiology.  Remains on low-dose lisinopril for renal protection with diabetes.  At least is able to go for walks in the afternoon for 10 minutes once a day, and bike 5 minutes once per day, has been working on his diet to limit sweets, no soda or fruit juices other than simon juice   No etoh   Tobacco 1 ppd has tried patches previously, denies any shortness of breath with activity worse than his baseline.      Current Outpatient Medications   Medication Sig Dispense Refill   • levothyroxine (SYNTHROID) 112 MCG Tab TAKE 1 TABLET BY MOUTH IN THE MORNING ON AN EMPTY STOMACH 90 tablet 0   • Insulin Pen Needle 31G X 6  MM Misc      • liothyronine (CYTOMEL) 25 MCG Tab Take 0.5 Tablets by mouth 2 (two) times a day for 90 days. 90 tablet 3   • lisinopril (PRINIVIL) 2.5 MG Tab Take 1 tablet by mouth once daily 90 tablet 3   • LANTUS SOLOSTAR 100 UNIT/ML Solution Pen-injector injection INJECT 12 UNITS SUBCUTANEOUSLY ONCE DAILY IN THE EVENING AS  INSTRUCTED 15 mL 3   • atorvastatin (LIPITOR) 80 MG tablet Take 1 Tab by mouth every day. 90 Tab 3   • prasugrel (EFFIENT) 10 MG Tab Take 1 Tab by mouth every day. 90 Tab 3   • metformin (GLUCOPHAGE) 1000 MG tablet Take 1 Tab by mouth 2 times a day, with meals. 180 Tab 3   • Lancets Lancets order: Lancets for relion prime strips and meter, check blood sugar once daily in am,E11.9 100 Each 11   • glucose blood (RELION PRIME TEST) strip 1 Strip by Other route every day. Check blood sugar before meal, E11.9 100 Each 11   • Insulin Pen Needle (RELION PEN NEEDLES) 31G X 6 MM Misc 1 Device by Does not apply route every day. Use with Lantus pen subcutaneously qday, E11.9 100 Each 11   • aspirin EC 81 MG EC tablet Take 1 Tab by mouth every day. 30 Tab 0   • Cholecalciferol (VITAMIN D3 PO) Take 1 Dose by mouth every day. Unknown OTC Strength.     • erythromycin base (UMM-TAB) 250 MG Tab Take 250 mg by mouth every day. Maintenance Medication.     • Riboflavin (VITAMIN B-2 PO) Take 1 Dose by mouth every day. Unknown OTC Strength.       No current facility-administered medications for this visit.            Tobacco  7/31/15 tobacco 1 ppd not interested in stopping smoking classes or education  8/28/15 tobacco 1 ppd not interested in stopping smoking classes or education  8/28/15 declines PFT    11/30/15 still smoking 20 cigs per day declines stop smoking classes or medications  5/31/16 declines stop smoking classes and medications, not interested in stop smoking classes, smoking ppd x 40 plus years, declines CT lung cancer screening, pulmonary function testing  11/29/16 still smoking 1 ppd, started smoking  age 22, declines stop smoking classes and medication, declines lung cancer screening program and PFT  5/30/17 still smoking 1 ppd declines stop smoking classes and medications, and lung cancer screening program and PFT  12/5/17 still smokes 1 ppd declines stop smoking classes and medications, and lung cancer screening program and PFT   6/12/18  still smokes 1 ppd declines stop smoking classes and medications, and lung cancer screening program and PFT   6/18/19 still smokes 1 ppd declines stop smoking classes and medications, and lung cancer screening program and PFT   12/10/19 still smoking 1 pack/day, declines stop smoking classes, medications, lung cancer screening, PFT  7/1/20 cardiology note stable, urged to quit smoking, follow-up 3 months  12/15/20 tobacco 1/2 ppd not interested to stop smoking classes, medications, CT lung cancer screening, or PFTs     Preventative health  8/23/15 pneumovax  1/12/16 zoster vaccine at [facility]  5/30/17 prevnar   6/11/20 psa 1.8  12/7/20 hep c ab negative  12/15/20 declines tdap  12/15/20 declines shingrix  12/15/20 declines colonoscopy  6/8/21 vit d 39.7  4/23/21 covid pfizer second     Postpolio syndrome  6/04 saw neurology in [place] , notes indicate chronic non specific complaints with normal brain MRI, no evidence to support post polio syndrome.     Peripheral vascular disease  12/22/17 ultrasound carotid less than 50% stenosis bilateral  12/28/17 JOSSUE lower extremities, normal at rest, post exercise right JOSSUE 0.8, left 0.8 mild-to-moderate arterial disease bilateral  12/28/17 ultrasound vascular lower extremities, no arterial occlusive disease from common femoral to popliteal bilateral, 50-75% stenosis proximal right external iliac, 50-75% stenosis distal left external iliac, patent right tibial arteries, left posterior tibial appears occluded distally, normal flow left anterior tibial, peroneal arteries retrograde suggesting proximal occlusion  7/11/19  ultrasound arterial lower extremities, duplex right stenosis distal external iliac greater than 50%, elevated velocities proximal portion posterior tibial and peroneal, left greater than 50% stenosis mid external iliac artery, occluded proximal portion posterior tibial artery with distal reconstitution ankle, short segment occlusion mid peroneal artery right JOSSUE 1.09, left JOSSUE 1.07  7/14/19 referral vascular surgery placed  12/10/19 declines vascular surgery follow-up asymptomatic  7/1/20 cardiology note stable, urged to quit smoking, follow-up 3 months     On ACE inhibitor  4/04 p.thallium no ischemia EF 62%  12/10 p.thallium no ischemia, EF 59%  5/9/11 rec ACE instead of atenolol patient declines  10/17/11 discontinued atenolol, rec start lotensin 10 mg; he declines  5/7/13 echo normal LV function, EF 60%, grade 1 diastolic dysfunction  5/7/13 p.thallium fixed defect mid inferior, inferoseptal, mid inferior walls suggest subdiaphragmatic uptake or prior infarction, no ischemia noted, EF 57%  8/8/15 hospital discharge on coreg 12.5 mg bid,lisinopril 5 mg, brilinta 90 mg bid, asa 81 mg,lipitor 80 mg  8/26/15 cardiology note, decrease coreg to 6.25 mg bid due to lower blood pressure continue lisinopril 5 mg    8/28/15 decrease lisinopril to 2.5 mg daily and cont coreg 6.25 mg bid  11/24/15 urine mac<2.5 on lisinopril 2.5 mg and coreg 6.25 mg bid  4/12/16 cardiology note decrease coreg to 3.125 mg bid consider discontinuation of coreg next evaluation and continue lisinopril 2.5 mg  5/20/16 urine mac <3 on lisinopril 2.5 mg  7/19/16 cardiology note clinically stable, episodes of chest tightness related to stress, blood pressure running low, discontinue carvedilol, follow-up in a few months  11/23/16 urine mac 3.2 on lisinopril 2.5 mg  11/29/17 urine mac<4 on lisinopril 2.5 mg  6/7/18 urine mac 3.8 on lisinopril 2.5 mg  6/10/19 urine mac<0.7 on lisinopril 2.5 mg  12/5/19 urine mac<0.7 on lisinopril 2.5 mg  6/11/20 urine  mac<1.2 on lisinopril 2.5 mg  12/7/20 urine mac<1.2 on lisinopril 2.5 mg  6/8/21 urine mac<3 on lisinopril 2.5 mg     mild nonproliferative diabetic retinopathy  6/4/19  eye exam, mild nonproliferative retinopathy     Interstitial cystitis  5/02 urology note [facility] nonbacterial prostatitis vs interstitial cystitis given trial of flomax     Insomnia on Xanax as needed  On xanax 0.25 mg at night prn  3/8/18 refill xanax  5/30/19 refill xanax  11/22/20 refill xanax  11/22/20      Hypothyroid  10/08 tsh 0.126,free t4 1.4,free t3 2.7  8/10 tsh 0.199 taking levothyroxine 0.125 6 days a week, and 2 tabs on john  9/10/10 increase to levothyroxine 0.137 mg daily, repeat tsh in 6 weeks  10/10 tsh 0.9  12/10 tsh 1.2, free t4 1.4, free t3 2.9  4/11 tsh 0.8, thyroxine 9.6, free thyroxine uptake 3.4, free t3 uptake 35%  9/11 tsh 0.5, free t4 0.9, free t3 2.4 on levothyroxine 137 mcg  12/11 tsh 0.3,free t4 1.5,free t3 2.7 on levothyroxine 137 mcg  4/16/13 tsh 0.28,free t4 1.1, free t3 2.3 (2.4-4.2); on levothyroxine 137 mcg; change to armour thyroid 60 mg qday  5/6/13 tsh 0.3, free t4 1.0, free t3 2.5 on levothyroxine 137 mcg ? Change to armour 60 mg  5/9/13  note decrease levothyroxine to 125 mcg and add cytomel 5 mg bid  6/13 tsh 0.45, free t4 1.2, free t3 2.9, on synthroid 125 mcg and cytomel 5 mcg bid per   8/14/13 tsh 0.235,free t4 1.1,free t3 2.8 on synthroid 125 mcg and cytomel 5 mcg bid per ;change to synthroid 150 mcg and stop cytomel per pt request  10/16/13 tsh 0.4, free t4 1.34, free t3 2.2 on synthroid 150 mcg  10/23/13  endo note; change to synthroid 125 mcg and cytomel 5 mcg daily  4/16/14 tsh 0.67,free t4 1.0,free t3 2.4  4/23/14  endocrine note, increase levothyroxine to 137 mcg and cont cytomel 5 mcg  10/28/14  endocrine note, tsh 0.6,free t4 1.0, free t3 2.7 on thyroxine 137 mcg and cytomel 5 mcg; gastroparesis symptoms  improve with cytomel, will reduce thryoxine to 125 mcg and use cytomel 25 mcg to cut and take more than once per day as needed, return in 4 months  7/3/15 tsh 0.04, free t4 0.6 and free t3 3.0 on levothyroxine 125 mcg and cytomel 12.5 mcg daily  7/21/15  endocrine note; on levothyroxine 125 mcg and cytomel 12.5 mcg daily, tsh 0.04, free t4 0.6 and free t3 3.0, patient does not want to change dose, no changes continue same dosing regimen; follow up 6 months  8/8/15  endocrine phone note, decrease levothyroxine to 112 mcg daily, continue cytomel 12.5 mg daily  1/13/16 tsh 0.016,free t4 0.9,free t3 2.5 on levothyroxine 112 mcg and cytomel 12.5 mg daily  1/22/16  endocrinology note on levothyroxine 112 mcg and cytomel 12.5 mg daily, patient declines to change dosing regimen  4/5/16 tsh 0.012,free t4 0.97,free t3 3.6 on levothyroxine 112 mcg and cytomel 12.5 mg daily  4/21/16  endocrinology note, no changes  10/13/16 tsh 0.14,free t4 0.87,free t3 3.1 on levothyroxine 112 mcg and cytomel 25 mg  4/17/17 tsh 0.016,free t4 0.9,free t3 4.0 on levothyroxine 112 mcg and cytomel 25 mg  4/19/17  endocrinology note no changes follow up 6 months  10/18/17  endocrine note no changes  4/12/18 tsh 0.012, free t4 0.99, free t3 4.7 on levothyroxine 112 mcg and cytomel 25 mg  4/18/18  endocrinology note, on levothyroxine 112 mcg daily and cytomel 12.5 mcg bid tsh 0.01, free 4 0.9, free t3 4.7, clinically feeling fine, no changes  10/15/18 endocrinology note repeat labs follow-up 6 months  10/14/19 endocrinology note, continue same thyroid medication dosing no changes follow-up 6 months  4/20/20 endocrinology note patient not willing to adjust his thyroid as it has been successful in controlling gastroparesis, follow-up 6 months  6/12/20 tsh 0.006 on levothyroxine 112 mcg daily and cytomel 12.5 mcg bid per endocrinology  12/7/20 tsh 0.009, free t4 1.23, t3 187  () on levothyroxine 112 mcg daily and cytomel 12.5 mcg bid per endocrinology  4/16/21 tsh<0.005,free t4 1.3,free t3 2.9  5/4/21 endocrinology note on levothyroxine 112 mcg and cytomel 12.5 mcg bid, continue and follow up 6 months     History shoulder pain  12/9/10 left shoulder pain,  note MRI pending  2/11 MRI left shoulder [facility] mild to moderate rotator cuff tendinopathy, adhesive capsulitis, small anterior and posterior labral tears  7/11 [facility] note  left shoulder adhesive capsulitis rec decompression  12/8/15 xray right shoulder at [facility] mild hydroxyapatite deposition adjacent to the greater tuberosity, no evidence of significant arthritis  5/11/16 MRI right shoulder thickening and increased signal with synovitis, suggestive of adhesive capsulitis, tendinopathy supraspinatus and infraspinatus, fatty atrophy paraspinous muscle  1/4/17 [facility] discharge note     History MI  8/6/15 hospital admission non-STEMI inverted T waves in aVL, ST depression in lead 1, initial troponin 3.4  8/6/15   8/6/15 cardiac catheterization left main free of disease, triple vessel disease prominently involving distal segment nature epicardial vessels and branches, 95% ostial LAD descending artery stenosis and 70% mid RCA stenosis, ejection fraction 35-40%, stenting ostial LAD with xience drug-eluting stent  8/7/15 echo normal LV function EF 65-70%, grade 1 diastolic dysfunction, moderate concentric LVH, mild MR and AR  8/8/15 hospital discharge on coreg 12.5 mg bid,lisinopril 5 mg, brilinta 90 mg bid, asa 81 mg,lipitor 80 mg  8/11/15 cardiology note; continue brilenta and asa for one year, some dyspnea, if no improvement could consider another antiplatelet therapy, will recheck BNP in 2 weeks with followup visit, ultimately will need myocardial perfusion scan but will defer for a few months  8/26/15 cardiology note, decrease coreg to 6.25 mg bid due to lower blood pressure,continue lisinopril 5 mg, asa,  lipitor,start effient 10 mg for one year due to brilinta causing shortness of breath, followup 2 months  10/14/15 cardiology note no chnges, repeat myocardial perfusion scan 3 months  11/30/15 cardiac rehab program  1/13/15 cardiology note nitroglycerin when necessary follow-up 3 months  1/12/16 persantine thallium small basal anterior inferior infarct with small ame-infarct ischemia, moderately sized moderate severity inferior, inferior lateral infarct with reversible ischemia  4/12/16 cardiology note decrease coreg to 3.125 mg bid consider discontinuation of coreg next evaluation and continue lisinopril 2.5 mg  7/19/16 cardiology note clinically stable, episodes of chest tightness related to stress, blood pressure running low, discontinue carvedilol, follow-up in a few months  11/3/16 cardiology note, isosorbide mononitrate with heavy exertion, follow-up in a few months to determine if further evaluation is necessary, could consider repeat perfusion study or dobutamine stress echo  1/5/17 cardiology note stable CAD, follow-up 6 months  7/12/17 cardiology note, likely stable discussed smoking cessation, patient declines to quit smoking, follow-up one year  12/22/17 ultrasound carotid less than 50% stenosis bilateral  7/23/18 cardiology note asymptomatic, increased stress however, continues to smoke, follow-up 1 year continue atorvastatin plus erythromycin  7/15/19 cardiology note stable continues to smoke advised to quit follow-up 1 year  6/19/20 hospital admission, 6/21/20 hospital discharge, admission for chest pain, cardiac catheterization performed, drug-eluting stent placement of RCA  6/19/20 echo mild concentric LVH, EF 65%, subtle inferior hypokinesis, normal diastolic function  6/19/20 cardiac catheterization left main mild distal tapering 10% stenosis, stent distal part widely patent, LAD widely patent ostial stent, diffuse moderate stenosis distal LAD and small diagonal branches, circumflex tortuous  origin 50%, obtuse marginal 1 occluded fills by collaterals stable compared to 2015, obtuse marginal 2 diffusely diseased with mild stenosis lateral wall, distal circumflex/obtuse marginal 3 is small with severe stenosis terminal segment, dominant RCA 40% proximal stenosis, 70% mid vessel stenosis, 40% distal stenosis, PDA multiple 50 to 70% stenosis mid and distal segments, successful mid RCA YESIKA placement, preintervention 70% stenosis, post intervention 0% residual stenosis  7/1/20 cardiology note stable, urged to quit smoking, follow-up 3 months  12/1/20 cardiology note side effects from metoprolol dry skin and cold fingers having already decreased from 25 mg to 12.5 mg daily, will discontinue metoprolol, continue asa, effient, lipitor, lisinopril  3/9/21 cardiology note consider increasing lisinopril, follow-up 6 months back to discontinue effient at that time     History of BPH  10/04 cystoscopy and green light photovaporization of prostate in [place], [place]     Gastroparesis  4/04 gastric emptying study severe gatroparesis done in [place], no hx of DM or MS  5/04 CT thorax in [place] negative  5/04 MRI brain in [place] no evid of MS  On Emycin  4/16/13 declined gastric stimulator  12/5/17 remains on erythromycin     Dyslipidemia  5/13 chol 158,trig 204,hdl 36,ldl 81  8/8/15 chol 97,trig 117,hdl 26,ldl 48 started on lipitor 80 mg on hospital discharge  8/21/15 chol 76,trig 85,hdl 24,ldl 35 on lipitor 80 mg  10/7/15 chol 67,trig 77,hdl 22,ldl 30 on lipitor 80 mg per cardiology  4/5/16 chol 64,trig 43,hdl 24,ldl 31 on lipitor 80 mg per cardiology  11/23/16 chol 83,trig 73,hdl 27,ldl 41 on lipitor 80 mg per cardiology  5/24/17 chol 85,trig 81,hdl 28,ldl 41 on lipitor 80 mg per cardiology  10/10/17 pharmacy known interaction with lipitor and erythromycin base, consider changing to crestor, offer made to patient to change to crestor but he would like to discuss with his cardiologist first  11/29/17 chol 72,trig 47,hdl 24,ldl 39 on  lipitor 80 mg and erythromycin base, previously recommended changing to crestor because of potential interaction, patient would like to discuss with cardiology first  12/5/17 declines to change from lipitor understanding potential interaction with erythromycin  6/7/18 chol 78,trig 68,hdl 25,ldl 39,cpk 69 on lipitor 80 mg  7/23/18 cardiology note asymptomatic, increased stress however, continues to smoke, follow-up 1 year continue atorvastatin plus erythromycin  12/3/18 chol 81,trig 85,hdl 26,ldl 38 on lipitor 80 mg  6/10/19 chol 64,trig 70,hdl 20,ldl 30 on lipitor 80 mg  6/10/20 chol 81,trig 74,hdl 25,ldl 41 on lipitor 80 mg  12/7/20 chol 72,trig 65,hdl 23,ldl 36 on lipitor 80 mg  6/8/21 chol 81,trig 54,hdl 27,ldl 41 on lipitor 80 mg     Diabetes  11/08 A1c 6.3%  8/10 A1c 7.9%  10/10 A1c 7.7%  12/10 A1c 8.0%  1/11 add metformin 500 mg bid  2/11 A1c 7.9%  4/11 A1c 8.2% increase metformin to 1000 mg bid  10/11 A1c 6.5 %  12/11 A1c 6.1% on metformin 1000 mg bid  4/16/13 A1c 6.2% on metformin 1000 mg bid; declines to check blood sugars at home; bs 194 non fasting; declines ACE  8/14/13 A1c 6.5% on metformin 1000 mg bid  12/9/13  eye exam grade 1 diabetic background retinopathy  4/16/14 A1c 7.0% per  on metformin 1000 mg bid  7/3/15 A1c 8.3% on metformin 1000 mg bid per endocrine   7/31/15 start lantus 5 units and continue metformin 1000 mg bid, declines ACE,statin,asa  8/8/15 hospital discharge on coreg 12.5 mg bid,lisinopril 5 mg, brilinta 90 mg bid, asa 81 mg,lipitor 80 mg; on lantus 8 units and metformin 1000 mg bid  8/28/15 declines nutrition consultation and diabetic education regarding diet  8/28/15 recommend increasing lantus to 10 units and metformin 1000 mg bid  11/24/15 A1c 6.3% on lantus 10 units and metformin 1000 mg bid  11/30/15 on lantus 12 units and metformin 1000 mg bid  5/20/16 A1c 6.3% on lantus 12 units and metformin 1000 mg bid  11/23/16 A1c 6.1% on lantus 12 units  and metformin 1000 mg bid  1/9/17  eye exam  5/24/17 A1c 6.3% on lantus 12 units and metformin 1000 mg bid   5/24/17 urine mac 2.5 on lisinopril 2.5 mg  11/29/17 A1c 6.1% on lantus 12 units and metformin 1000 mg bid   6/7/18 A1c 5.9% on lantus 12 units and metformin 1000 mg bid   12/3/18 A1c 6.2% and urine mac< 3on lantus 12 units and metformin 1000 mg bid   6/4/19  eye exam, mild nonproliferative retinopathy  6/10/19 A1c 6.4% on lantus 12 units and metformin 1000 mg bid   12/5/19 A1c 6.2% on lantus 12 units and metformin 1000 mg bid   6/11/20 A1c 6.5% on lantus 12 units and metformin 1000 mg bid   12/7/20 A1c 5.9% on lantus 12 units and metformin 1000 mg bid   6/8/21 A1c 6.0% on lantus 12 units and metformin 1000 mg bid                 Patient Active Problem List   Diagnosis   • History of allergic rhinitis   • Gastroparesis   • History of BPH   • Interstitial cystitis   • Postpolio syndrome   • Hypothyroid   • On angiotensin-converting enzyme (ACE) inhibitors (for diabetes, not HTN)   • Diabetes mellitus type 2, controlled (HCC)   • Preventative health care   • History of shoulder pain   • Insomnia   • Dyslipidemia   • Tobacco abuse   • History of MI (myocardial infarction)   • Coronary artery disease due to calcified coronary lesion   • PVD (peripheral vascular disease) ([facility])   • Mild non proliferative diabetic retinopathy (HCC)   • Stented coronary artery     Depression Screening    Little interest or pleasure in doing things?  0 - not at all  Feeling down, depressed , or hopeless? 0 - not at all  Patient Health Questionnaire Score: 0          Health Maintenance Summary                Annual Wellness Visit Overdue 5/31/2018      Done 5/30/2017 Visit Dx: Medicare annual wellness visit, subsequent    RETINAL SCREENING Overdue 12/10/2020      Done 12/10/2019 6/4/19      Patient has more history with this topic...    IMM ZOSTER VACCINES Postponed 11/14/2021 Originally 3/8/2016. Patient  "Refused     Done 1/12/2016 Imm Admin: Zoster Vaccine Live (ZVL) (Zostavax) - HISTORICAL DATA    A1C SCREENING Next Due 12/8/2021      Done 6/8/2021 HEMOGLOBIN A1C     Patient has more history with this topic...    DIABETES MONOFILAMENT / LE EXAM Next Due 12/15/2021      Done 12/15/2020 AMB DIABETIC MONOFILAMENT LOWER EXTREMITY EXAM     Patient has more history with this topic...    FASTING LIPID PROFILE Next Due 6/8/2022      Done 6/8/2021 LIPID PANEL     Patient has more history with this topic...    URINE ACR / MICROALBUMIN Next Due 6/8/2022      Done 6/8/2021 MICROALB/CREAT RATIO RAND. UR     Patient has more history with this topic...    SERUM CREATININE Next Due 6/8/2022      Done 6/8/2021 COMP METABOLIC PANEL     Patient has more history with this topic...    COLONOSCOPY Next Due 5/31/2026      Patient Declined 5/31/2016      Patient has more history with this topic...          Patient Care Team:  Charbel Jaffe M.D. as PCP - General  Alex Stein M.D. as Consulting Physician (Endocrinology)      ROS       Objective:     /64 (BP Location: Right arm, Patient Position: Sitting, BP Cuff Size: Adult)   Pulse 91   Temp 36.6 °C (97.9 °F)   Ht 1.702 m (5' 7\")   Wt 71.7 kg (158 lb)   SpO2 98%   BMI 24.75 kg/m²      Physical Exam  Vitals and nursing note reviewed.   Constitutional:       Appearance: Normal appearance.   HENT:      Head: Normocephalic and atraumatic.      Right Ear: External ear normal.      Left Ear: External ear normal.   Eyes:      Conjunctiva/sclera: Conjunctivae normal.   Cardiovascular:      Rate and Rhythm: Normal rate and regular rhythm.      Heart sounds: Normal heart sounds. No murmur heard.     Pulmonary:      Effort: Pulmonary effort is normal.      Breath sounds: Normal breath sounds.   Abdominal:      General: There is no distension.   Musculoskeletal:         General: No swelling.      Cervical back: Neck supple.   Skin:     General: Skin is warm.   Neurological:      " Mental Status: He is alert.   Psychiatric:         Mood and Affect: Mood normal.         Behavior: Behavior normal.                        Assessment/Plan:        Assessment  #1 diabetes 6/8/21 A1c 6.0% on lantus 12 units and metformin 1000 mg bid most recent A1c stable although his fasting sugars midday have slightly trended upwards although not alarming, it is a upward trend we need to monitor, as his activity and nutrition patterns have been unchanged, also remains on low-dose lisinopril 2.5 mg    #2 history of MI followed by cardiology remains on Effient, atorvastatin 80 mg per cardiology    #3 dyslipidemia 6/8/21 chol 81,trig 54,hdl 27,ldl 41 on lipitor 80 mg    #4 hypothyroid followed by endocrinology 4/16/21 tsh<0.005,free t4 1.3,free t3 2.9, 5/4/21 endocrinology note on levothyroxine 112 mcg and cytomel 12.5 mcg bid, continue and follow up 6 months    #5 tobacco 1 pack per day    #6 gastroparesis stable on erythromycin    #7 peripheral vascular disease last arterial exam 7/11/19 ultrasound arterial lower extremities, duplex right stenosis distal external iliac greater than 50%, elevated velocities proximal portion posterior tibial and peroneal, left greater than 50% stenosis mid external iliac artery, occluded proximal portion posterior tibial artery with distal reconstitution ankle, short segment occlusion mid peroneal artery right JOSSUE 1.09, left JOSSUE 1.07, asymptomatic patient denies symptoms    Plan  #1 declines stop smoking classes    #2 declines CT thorax    #3 declines PFT    #4 discussed long term risks tobacco including COPD, lung cancer, recurrent heart disease, worsening peripheral vascular disease, stroke, cancer, osteoporosis (he said endocrinology was going to order a bone density test)    #5 declines follow up vascular studies arterial lower extremities     #6 eye exam annually, follow-up with his optometrist    #7 declines colonoscopy and cologuard     #8 continue checking blood sugars daily  and blood pressures regularly, repeat A1c 3 months    #9 encouraged him to perhaps do 5 minutes of trial of a stationary bike twice a day total    #10 follow-up cardiology and endocrinology    #11 recommend follow-up shingles vaccination with the Shingrix at his local pharmacy, he has already received the Covid vaccine series    #12 follow-up 6 months

## 2021-06-19 ENCOUNTER — HOSPITAL ENCOUNTER (EMERGENCY)
Facility: MEDICAL CENTER | Age: 41
End: 2021-06-19
Attending: EMERGENCY MEDICINE
Payer: MEDICAID

## 2021-06-19 VITALS
DIASTOLIC BLOOD PRESSURE: 57 MMHG | TEMPERATURE: 98 F | RESPIRATION RATE: 18 BRPM | BODY MASS INDEX: 33.55 KG/M2 | HEART RATE: 60 BPM | OXYGEN SATURATION: 96 % | HEIGHT: 78 IN | WEIGHT: 290 LBS | SYSTOLIC BLOOD PRESSURE: 107 MMHG

## 2021-06-19 DIAGNOSIS — R55 NEAR SYNCOPE: ICD-10-CM

## 2021-06-19 DIAGNOSIS — T67.9XXA HEAT EXPOSURE, INITIAL ENCOUNTER: ICD-10-CM

## 2021-06-19 LAB
ALBUMIN SERPL BCP-MCNC: 4.4 G/DL (ref 3.2–4.9)
ALBUMIN/GLOB SERPL: 1.8 G/DL
ALP SERPL-CCNC: 46 U/L (ref 30–99)
ALT SERPL-CCNC: 14 U/L (ref 2–50)
ANION GAP SERPL CALC-SCNC: 16 MMOL/L (ref 7–16)
AST SERPL-CCNC: 17 U/L (ref 12–45)
BASOPHILS # BLD AUTO: 1.3 % (ref 0–1.8)
BASOPHILS # BLD: 0.15 K/UL (ref 0–0.12)
BILIRUB SERPL-MCNC: 0.2 MG/DL (ref 0.1–1.5)
BUN SERPL-MCNC: 35 MG/DL (ref 8–22)
CALCIUM SERPL-MCNC: 9.8 MG/DL (ref 8.5–10.5)
CHLORIDE SERPL-SCNC: 100 MMOL/L (ref 96–112)
CO2 SERPL-SCNC: 21 MMOL/L (ref 20–33)
CREAT SERPL-MCNC: 1.2 MG/DL (ref 0.5–1.4)
EKG IMPRESSION: NORMAL
EOSINOPHIL # BLD AUTO: 0.22 K/UL (ref 0–0.51)
EOSINOPHIL NFR BLD: 1.9 % (ref 0–6.9)
ERYTHROCYTE [DISTWIDTH] IN BLOOD BY AUTOMATED COUNT: 48.3 FL (ref 35.9–50)
GLOBULIN SER CALC-MCNC: 2.4 G/DL (ref 1.9–3.5)
GLUCOSE SERPL-MCNC: 171 MG/DL (ref 65–99)
HCT VFR BLD AUTO: 41 % (ref 42–52)
HGB BLD-MCNC: 13.1 G/DL (ref 14–18)
IMM GRANULOCYTES # BLD AUTO: 0.22 K/UL (ref 0–0.11)
IMM GRANULOCYTES NFR BLD AUTO: 1.9 % (ref 0–0.9)
LYMPHOCYTES # BLD AUTO: 3.46 K/UL (ref 1–4.8)
LYMPHOCYTES NFR BLD: 29.2 % (ref 22–41)
MCH RBC QN AUTO: 27.6 PG (ref 27–33)
MCHC RBC AUTO-ENTMCNC: 32 G/DL (ref 33.7–35.3)
MCV RBC AUTO: 86.5 FL (ref 81.4–97.8)
MONOCYTES # BLD AUTO: 1.39 K/UL (ref 0–0.85)
MONOCYTES NFR BLD AUTO: 11.7 % (ref 0–13.4)
NEUTROPHILS # BLD AUTO: 6.39 K/UL (ref 1.82–7.42)
NEUTROPHILS NFR BLD: 54 % (ref 44–72)
NRBC # BLD AUTO: 0 K/UL
NRBC BLD-RTO: 0 /100 WBC
PLATELET # BLD AUTO: 342 K/UL (ref 164–446)
PMV BLD AUTO: 9.8 FL (ref 9–12.9)
POTASSIUM SERPL-SCNC: 4.2 MMOL/L (ref 3.6–5.5)
PROT SERPL-MCNC: 6.8 G/DL (ref 6–8.2)
RBC # BLD AUTO: 4.74 M/UL (ref 4.7–6.1)
SODIUM SERPL-SCNC: 137 MMOL/L (ref 135–145)
TROPONIN T SERPL-MCNC: 14 NG/L (ref 6–19)
WBC # BLD AUTO: 11.8 K/UL (ref 4.8–10.8)

## 2021-06-19 PROCEDURE — 93005 ELECTROCARDIOGRAM TRACING: CPT | Performed by: EMERGENCY MEDICINE

## 2021-06-19 PROCEDURE — 36415 COLL VENOUS BLD VENIPUNCTURE: CPT

## 2021-06-19 PROCEDURE — 80053 COMPREHEN METABOLIC PANEL: CPT

## 2021-06-19 PROCEDURE — 84484 ASSAY OF TROPONIN QUANT: CPT

## 2021-06-19 PROCEDURE — 85025 COMPLETE CBC W/AUTO DIFF WBC: CPT

## 2021-06-19 PROCEDURE — 99283 EMERGENCY DEPT VISIT LOW MDM: CPT

## 2021-06-19 ASSESSMENT — FIBROSIS 4 INDEX: FIB4 SCORE: 0.53

## 2021-06-19 ASSESSMENT — LIFESTYLE VARIABLES: DO YOU DRINK ALCOHOL: NO

## 2021-06-19 NOTE — ED TRIAGE NOTES
Chief Complaint   Patient presents with   • Heat Exposure     Pt BIB EMS to triage for above. [place] [address] [place] called to be further evaluated.

## 2021-06-19 NOTE — ED PROVIDER NOTES
"ED Provider Note    ED Provider Note    Primary care provider: ANIKET Davis  Means of arrival: EMS  History obtained from: Patient    CHIEF COMPLAINT  Chief Complaint   Patient presents with   • Heat Exposure     Seen at 4:06 PM.   HPI  Norm Prabhakar is a 41 y.o. male who presents to the Emergency Department stating that he had 2 episodes of \"heatstroke \"today.  He states that he was out in the sun walking to [employer] when he felt lightheaded, his vision was going dim, he thought that he might pass out.  He then went to [facility], bought himself a new water bottle but was unable to fill it with water as the films were closed due to COVID-19 precautions.  The patient then went to walk home and he once again had a feeling of being hot, lightheaded and dimming of the vision.  He did not fall on either occasion he does not believe he lost consciousness.  He denies any specific pain.  He denies nausea, vomiting or diarrhea.  He had his second dose of COVID-19 vaccine today.    REVIEW OF SYSTEMS  See HPI,   Remainder of ROS negative.     PAST MEDICAL HISTORY   has a past medical history of Anxiety, ASTHMA, Bipolar 1 disorder ([facility]), Depression, Diabetes ([facility]), Fall, Glaucoma, Glaucoma (), Hypothyroidism, Indigestion, Mental disorder, Murmur, Pneumonia, Psychiatric problem (), S/P thyroidectomy, Seizure ([facility]) (), Seizure disorder ([facility]), and Unspecified disorder of thyroid.    SURGICAL HISTORY   has a past surgical history that includes eye surgery; thyroid lobectomy; and other.    SOCIAL HISTORY  Social History     Tobacco Use   • Smoking status: Former Smoker     Packs/day: 0.25     Types: Cigarettes, Cigars     Quit date: 2020     Years since quittin.2   • Smokeless tobacco: Never Used   • Tobacco comment: 3 cigarettes a day   Vaping Use   • Vaping Use: Former   Substance Use Topics   • Alcohol use: Not Currently   • Drug use: Yes     Types: Inhaled     Comment: marijuana      Social History " "    Substance and Sexual Activity   Drug Use Yes   • Types: Inhaled    Comment: marijuana       FAMILY HISTORY  Family History   Problem Relation Age of Onset   • Hypertension Mother    • Heart Disease Mother    • Lung Disease Mother    • Stroke Maternal Grandmother        CURRENT MEDICATIONS  Reviewed.  See Encounter Summary.     ALLERGIES  Allergies   Allergen Reactions   • Geodon [Ziprasidone Hcl] Anaphylaxis     Anaphylaxis per patient   • Abilify      \"Feeling tired, like I don't even know whats going on around me\"   • Fish      Pt reports fish causes him to be sick to his stomach  Not listed on MAR noted 2/3/2021         PHYSICAL EXAM  VITAL SIGNS: /57   Pulse 60   Temp 36.7 °C (98 °F) (Temporal)   Resp 18   Ht 1.981 m (6' 6\")   Wt (!) 132 kg (290 lb)   SpO2 96%   BMI 33.51 kg/m²   Constitutional: Awake, alert in no apparent distress.  HENT: Normocephalic, Bilateral external ears normal. Nose normal.   Eyes: Conjunctiva normal, non-icteric, EOMI.    Thorax & Lungs: Easy unlabored respirations, Clear to ascultation bilaterally.  Cardiovascular: Regular rate, Regular rhythm, No murmurs, rubs or gallops. Bilateral pulses symmetrical.   Abdomen:  Soft, nontender, nondistended, normal active bowel sounds.   :    Skin: Visualized skin is  Dry, No erythema, No rash.   Musculoskeletal:   No cyanosis, clubbing or edema. No leg asymmetry.   Neurologic: Alert, Grossly non-focal.   Psychiatric: Normal affect, Normal mood  Lymphatic:  No cervical LAD    EKG   12 lead Interpreted by me  Rhythm:  Normal sinus rhythm   Rate: 76  Axis: normal  Ectopy: none  Conduction: normal  ST Segments: no acute change  T Waves: no acute change  Clinical Impression: Normal EKG without acute changes     RADIOLOGY  No orders to display         COURSE & MEDICAL DECISION MAKING  Pertinent Labs & Imaging studies reviewed. (See chart for details)    Differential diagnoses include but are not limited to: Dehydration, near syncope, " DKA    4:06 PM - Medical record reviewed, patient with history of diabetes, poor compliance, frequent visits to the emergency department.  Seen here last week for syncope, work-up unrevealing except for hyperglycemia.  He has been seen several times for possible CVA symptoms, thought to be conversion disorder.  Multiple MRIs CTAs negative.    Decision Making:  This is a pleasant 41 y.o. year old male who presents with near syncope.  The patient has had numerous evaluations in the past.  This is similar to his prior presentations.  He had a near syncopal event yet again today.  Most recent 2D echo was from last year, he has had 4 in our system that were unrevealing.  EKG does not show acute ischemic changes, high-sensitivity opponent not elevated, he does not have acute renal insufficiency.  He has some mild hyperglycemia without evidence of DKA.  He has a slight leukocytosis without leftward shift, no significant anemia.  Overall the patient is well-appearing, he does not have a tachycardia nor hypotensive.  This appears consistent with prior presentations.  Supportive care is recommended.      Discharge Medications:  Discharge Medication List as of 6/19/2021  5:53 PM          The patient was discharged home (see d/c instructions) was told to return immediately for any signs or symptoms listed, or any worsening at all.  The patient verbally agreed to the discharge precautions and follow-up plan which is documented in EPIC.        FINAL IMPRESSION  1. Near syncope    2. Heat exposure, initial encounter

## 2021-07-01 ENCOUNTER — HOSPITAL ENCOUNTER (EMERGENCY)
Facility: MEDICAL CENTER | Age: 41
End: 2021-07-02
Attending: EMERGENCY MEDICINE
Payer: MEDICAID

## 2021-07-01 DIAGNOSIS — R73.9 HYPERGLYCEMIA: ICD-10-CM

## 2021-07-01 LAB
ALBUMIN SERPL BCP-MCNC: 4.3 G/DL (ref 3.2–4.9)
ALBUMIN/GLOB SERPL: 1.6 G/DL
ALP SERPL-CCNC: 62 U/L (ref 30–99)
ALT SERPL-CCNC: 11 U/L (ref 2–50)
ANION GAP SERPL CALC-SCNC: 14 MMOL/L (ref 7–16)
ANISOCYTOSIS BLD QL SMEAR: ABNORMAL
APPEARANCE UR: CLEAR
AST SERPL-CCNC: 10 U/L (ref 12–45)
B-OH-BUTYR SERPL-MCNC: 0.26 MMOL/L (ref 0.02–0.27)
BASE EXCESS BLDV CALC-SCNC: -2 MMOL/L
BASOPHILS # BLD AUTO: 0 % (ref 0–1.8)
BASOPHILS # BLD: 0 K/UL (ref 0–0.12)
BILIRUB SERPL-MCNC: <0.2 MG/DL (ref 0.1–1.5)
BILIRUB UR QL STRIP.AUTO: NEGATIVE
BODY TEMPERATURE: ABNORMAL CENTIGRADE
BUN SERPL-MCNC: 25 MG/DL (ref 8–22)
CALCIUM SERPL-MCNC: 9.8 MG/DL (ref 8.5–10.5)
CHLORIDE SERPL-SCNC: 93 MMOL/L (ref 96–112)
CO2 SERPL-SCNC: 23 MMOL/L (ref 20–33)
COLOR UR: YELLOW
CREAT SERPL-MCNC: 1.13 MG/DL (ref 0.5–1.4)
DOHLE BOD BLD QL SMEAR: NORMAL
EKG IMPRESSION: NORMAL
EOSINOPHIL # BLD AUTO: 0 K/UL (ref 0–0.51)
EOSINOPHIL NFR BLD: 0 % (ref 0–6.9)
ERYTHROCYTE [DISTWIDTH] IN BLOOD BY AUTOMATED COUNT: 45.1 FL (ref 35.9–50)
GLOBULIN SER CALC-MCNC: 2.7 G/DL (ref 1.9–3.5)
GLUCOSE BLD-MCNC: 384 MG/DL (ref 65–99)
GLUCOSE BLD-MCNC: 514 MG/DL (ref 65–99)
GLUCOSE BLD-MCNC: >600 MG/DL (ref 65–99)
GLUCOSE SERPL-MCNC: 658 MG/DL (ref 65–99)
GLUCOSE UR STRIP.AUTO-MCNC: >=1000 MG/DL
HCO3 BLDV-SCNC: 22 MMOL/L (ref 24–28)
HCT VFR BLD AUTO: 41.5 % (ref 42–52)
HGB BLD-MCNC: 13.8 G/DL (ref 14–18)
KETONES UR STRIP.AUTO-MCNC: NEGATIVE MG/DL
LEUKOCYTE ESTERASE UR QL STRIP.AUTO: NEGATIVE
LYMPHOCYTES # BLD AUTO: 2.35 K/UL (ref 1–4.8)
LYMPHOCYTES NFR BLD: 21.4 % (ref 22–41)
MAGNESIUM SERPL-MCNC: 2.1 MG/DL (ref 1.5–2.5)
MANUAL DIFF BLD: NORMAL
MCH RBC QN AUTO: 28 PG (ref 27–33)
MCHC RBC AUTO-ENTMCNC: 33.3 G/DL (ref 33.7–35.3)
MCV RBC AUTO: 84.2 FL (ref 81.4–97.8)
METAMYELOCYTES NFR BLD MANUAL: 0.9 %
MICRO URNS: ABNORMAL
MICROCYTES BLD QL SMEAR: ABNORMAL
MONOCYTES # BLD AUTO: 0.56 K/UL (ref 0–0.85)
MONOCYTES NFR BLD AUTO: 5.1 % (ref 0–13.4)
MORPHOLOGY BLD-IMP: NORMAL
MYELOCYTES NFR BLD MANUAL: 1.7 %
NEUTROPHILS # BLD AUTO: 7.8 K/UL (ref 1.82–7.42)
NEUTROPHILS NFR BLD: 70.9 % (ref 44–72)
NITRITE UR QL STRIP.AUTO: NEGATIVE
NRBC # BLD AUTO: 0 K/UL
NRBC BLD-RTO: 0 /100 WBC
OSMOLALITY SERPL: 320 MOSM/KG H2O (ref 278–298)
PCO2 BLDV: 36.1 MMHG (ref 41–51)
PH BLDV: 7.41 [PH] (ref 7.31–7.45)
PH UR STRIP.AUTO: 7 [PH] (ref 5–8)
PHOSPHATE SERPL-MCNC: 2.5 MG/DL (ref 2.5–4.5)
PLATELET # BLD AUTO: 356 K/UL (ref 164–446)
PLATELET BLD QL SMEAR: NORMAL
PMV BLD AUTO: 10.2 FL (ref 9–12.9)
PO2 BLDV: 33.8 MMHG (ref 25–40)
POTASSIUM SERPL-SCNC: 4.7 MMOL/L (ref 3.6–5.5)
PROT SERPL-MCNC: 7 G/DL (ref 6–8.2)
PROT UR QL STRIP: NEGATIVE MG/DL
RBC # BLD AUTO: 4.93 M/UL (ref 4.7–6.1)
RBC BLD AUTO: PRESENT
RBC UR QL AUTO: NEGATIVE
SAO2 % BLDV: 61.9 %
SODIUM SERPL-SCNC: 130 MMOL/L (ref 135–145)
SP GR UR STRIP.AUTO: 1.03
TOXIC GRANULES BLD QL SMEAR: SLIGHT
UROBILINOGEN UR STRIP.AUTO-MCNC: 0.2 MG/DL
WBC # BLD AUTO: 11 K/UL (ref 4.8–10.8)

## 2021-07-01 PROCEDURE — 96374 THER/PROPH/DIAG INJ IV PUSH: CPT

## 2021-07-01 PROCEDURE — 81003 URINALYSIS AUTO W/O SCOPE: CPT

## 2021-07-01 PROCEDURE — 80053 COMPREHEN METABOLIC PANEL: CPT

## 2021-07-01 PROCEDURE — 700102 HCHG RX REV CODE 250 W/ 637 OVERRIDE(OP): Performed by: EMERGENCY MEDICINE

## 2021-07-01 PROCEDURE — 700105 HCHG RX REV CODE 258: Performed by: EMERGENCY MEDICINE

## 2021-07-01 PROCEDURE — 85007 BL SMEAR W/DIFF WBC COUNT: CPT

## 2021-07-01 PROCEDURE — 82803 BLOOD GASES ANY COMBINATION: CPT

## 2021-07-01 PROCEDURE — 85027 COMPLETE CBC AUTOMATED: CPT

## 2021-07-01 PROCEDURE — 99285 EMERGENCY DEPT VISIT HI MDM: CPT

## 2021-07-01 PROCEDURE — 93005 ELECTROCARDIOGRAM TRACING: CPT | Performed by: EMERGENCY MEDICINE

## 2021-07-01 PROCEDURE — 84100 ASSAY OF PHOSPHORUS: CPT

## 2021-07-01 PROCEDURE — 83735 ASSAY OF MAGNESIUM: CPT

## 2021-07-01 PROCEDURE — 82962 GLUCOSE BLOOD TEST: CPT | Mod: 91

## 2021-07-01 PROCEDURE — 36415 COLL VENOUS BLD VENIPUNCTURE: CPT

## 2021-07-01 PROCEDURE — 82010 KETONE BODYS QUAN: CPT

## 2021-07-01 PROCEDURE — 83930 ASSAY OF BLOOD OSMOLALITY: CPT

## 2021-07-01 RX ORDER — SODIUM CHLORIDE 9 MG/ML
2000 INJECTION, SOLUTION INTRAVENOUS ONCE
Status: COMPLETED | OUTPATIENT
Start: 2021-07-01 | End: 2021-07-01

## 2021-07-01 RX ORDER — LEVOTHYROXINE SODIUM 0.2 MG/1
200 TABLET ORAL
Status: ON HOLD | COMMUNITY
End: 2022-03-20 | Stop reason: SDUPTHER

## 2021-07-01 RX ORDER — FENOFIBRATE 134 MG/1
134 CAPSULE ORAL DAILY
Status: ON HOLD | COMMUNITY
End: 2022-03-20 | Stop reason: SDUPTHER

## 2021-07-01 RX ORDER — SODIUM CHLORIDE 9 MG/ML
1000 INJECTION, SOLUTION INTRAVENOUS ONCE
Status: COMPLETED | OUTPATIENT
Start: 2021-07-01 | End: 2021-07-02

## 2021-07-01 RX ORDER — ALBUTEROL SULFATE 90 UG/1
2 AEROSOL, METERED RESPIRATORY (INHALATION) EVERY 6 HOURS PRN
Status: SHIPPED | COMMUNITY
End: 2022-02-26

## 2021-07-01 RX ORDER — DOCUSATE SODIUM 100 MG/1
100 CAPSULE, LIQUID FILLED ORAL DAILY
Status: ON HOLD | COMMUNITY
End: 2023-03-10

## 2021-07-01 RX ORDER — DIVALPROEX SODIUM 500 MG/1
1500 TABLET, DELAYED RELEASE ORAL
Status: ON HOLD | COMMUNITY
End: 2022-03-20 | Stop reason: SDUPTHER

## 2021-07-01 RX ADMIN — INSULIN HUMAN 10 UNITS: 100 INJECTION, SOLUTION PARENTERAL at 21:57

## 2021-07-01 RX ADMIN — SODIUM CHLORIDE 2000 ML: 9 INJECTION, SOLUTION INTRAVENOUS at 20:48

## 2021-07-01 RX ADMIN — SODIUM CHLORIDE 1000 ML: 9 INJECTION, SOLUTION INTRAVENOUS at 23:22

## 2021-07-01 ASSESSMENT — LIFESTYLE VARIABLES
TOTAL SCORE: 0
HAVE YOU EVER FELT YOU SHOULD CUT DOWN ON YOUR DRINKING: NO
ON A TYPICAL DAY WHEN YOU DRINK ALCOHOL HOW MANY DRINKS DO YOU HAVE: 0
CONSUMPTION TOTAL: NEGATIVE
AVERAGE NUMBER OF DAYS PER WEEK YOU HAVE A DRINK CONTAINING ALCOHOL: 0
DOES PATIENT WANT TO STOP DRINKING: NO
DO YOU DRINK ALCOHOL: NO
EVER HAD A DRINK FIRST THING IN THE MORNING TO STEADY YOUR NERVES TO GET RID OF A HANGOVER: NO
HOW MANY TIMES IN THE PAST YEAR HAVE YOU HAD 5 OR MORE DRINKS IN A DAY: 0
TOTAL SCORE: 0
HAVE PEOPLE ANNOYED YOU BY CRITICIZING YOUR DRINKING: NO
TOTAL SCORE: 0
EVER FELT BAD OR GUILTY ABOUT YOUR DRINKING: NO

## 2021-07-01 ASSESSMENT — FIBROSIS 4 INDEX: FIB4 SCORE: 0.52

## 2021-07-02 VITALS
SYSTOLIC BLOOD PRESSURE: 104 MMHG | OXYGEN SATURATION: 93 % | DIASTOLIC BLOOD PRESSURE: 67 MMHG | HEART RATE: 74 BPM | HEIGHT: 78 IN | TEMPERATURE: 98.4 F | WEIGHT: 280 LBS | BODY MASS INDEX: 32.4 KG/M2 | RESPIRATION RATE: 17 BRPM

## 2021-07-02 LAB — GLUCOSE BLD-MCNC: 367 MG/DL (ref 65–99)

## 2021-07-02 PROCEDURE — 82962 GLUCOSE BLOOD TEST: CPT

## 2021-07-02 NOTE — ED PROVIDER NOTES
ED Provider Note    CHIEF COMPLAINT  Chief Complaint   Patient presents with   • Hyperglycemia       HPI  Norm Prabhakar is a 41 y.o. male who presents to the emergency room with elevated blood sugar. Past medical history as documented below. Patient currently living at [facility]. States that his blood sugars have been well of recent. Today however he noticed the spike. He denies any recent illness. She has been taking his medications as prescribed. Is now on a sliding scale. Primary complaint is generalized fatigue with slight nausea and abdominal cramping.    REVIEW OF SYSTEMS  See HPI for further details. All other systems are negative.     PAST MEDICAL HISTORY   has a past medical history of Anxiety, ASTHMA, Bipolar 1 disorder ([facility]), Depression, Diabetes ([facility]), Fall, Glaucoma, Glaucoma (), Hypothyroidism, Indigestion, Mental disorder, Murmur, Pneumonia, Psychiatric problem (), S/P thyroidectomy, Seizure ([facility]) (), Seizure disorder ([facility]), and Unspecified disorder of thyroid.    SOCIAL HISTORY  Social History     Tobacco Use   • Smoking status: Former Smoker     Packs/day: 0.25     Types: Cigarettes, Cigars     Quit date: 2020     Years since quittin.2   • Smokeless tobacco: Never Used   • Tobacco comment: 3 cigarettes a day   Vaping Use   • Vaping Use: Former   Substance and Sexual Activity   • Alcohol use: Not Currently   • Drug use: Yes     Types: Inhaled     Comment: marijuana   • Sexual activity: Not on file       SURGICAL HISTORY   has a past surgical history that includes eye surgery; thyroid lobectomy; and other.    CURRENT MEDICATIONS  Home Medications     Reviewed by Laz Kincaid, PhT (Pharmacy Tech) on 21 at 8  Med List Status: Partial   Medication Last Dose Status   atorvastatin (LIPITOR) 10 MG Tab UNK Active   budesonide-formoterol (SYMBICORT) 160-4.5 MCG/ACT Aerosol UNK Active   Cholecalciferol (VITAMIN D3) 2000 UNIT Cap UNK Active   divalproex  "(DEPAKOTE) 500 MG Tablet Delayed Response UNK Active   docusate sodium (COLACE) 100 MG Cap UNK Active   Empagliflozin (JARDIANCE) 25 MG Tab UNK Active   Fenofibrate 134 MG Cap UNK Active   insulin glargine (BASAGLAR KWIKPEN) 100 UNIT/ML injection PEN UNK Active   levothyroxine (SYNTHROID) 200 MCG Tab UNK Active   levothyroxine (SYNTHROID) 25 MCG Tab UNK Active   lisinopril (PRINIVIL) 10 MG Tab UNK Active   lurasidone (LATUDA) 20 MG Tab UNK Active   metformin (GLUCOPHAGE) 1000 MG tablet UNK Active   montelukast (SINGULAIR) 10 MG Tab UNK Active   Omega-3 Fatty Acids (FISH OIL) 1000 MG Cap capsule UNK Active   ondansetron (ZOFRAN ODT) 4 MG TABLET DISPERSIBLE UNK Active   prazosin (MINIPRESS) 2 MG Cap UNK Active   sertraline (ZOLOFT) 100 MG Tab UNK Active   SITagliptin (JANUVIA) 100 MG Tab UNK Active                ALLERGIES  Allergies   Allergen Reactions   • Geodon [Ziprasidone Hcl] Anaphylaxis     Anaphylaxis per patient   • Abilify      \"Feeling tired, like I don't even know whats going on around me\"   • Fish      Pt reports fish causes him to be sick to his stomach  Not listed on MAR noted 2/3/2021         PHYSICAL EXAM  VITAL SIGNS: /67   Pulse 74   Temp 36.9 °C (98.4 °F) (Temporal)   Resp 17   Ht 1.981 m (6' 6\")   Wt (!) 127 kg (280 lb)   SpO2 93%   BMI 32.36 kg/m²  @NGHIA[[id number]::@   Pulse ox interpretation: I interpret this pulse ox as normal.  Constitutional: Alert in no apparent distress.  HENT: No signs of trauma, Bilateral external ears normal, Nose normal.   Eyes: Pupils are equal and reactive  Neck: Normal range of motion, No tenderness, Supple  Cardiovascular: tachycardic, regular, no murmurs  Thorax & Lungs: Normal breath sounds, No respiratory distress, No wheezing, No chest tenderness.   Abdomen: Bowel sounds normal, Soft, No tenderness  Skin: Warm, Dry, No erythema, No rash.   Extremities: Intact distal pulses  Musculoskeletal: Good range of motion in all major joints. No tenderness to " palpation or major deformities noted.   Neurologic: Alert , Normal motor function, Normal sensory function, No focal deficits noted.   Psychiatric: Affect normal, Judgment normal, Mood normal.       DIAGNOSTIC STUDIES / PROCEDURES      LABS  Results for orders placed or performed during the hospital encounter of 07/01/21   CBC WITH DIFFERENTIAL   Result Value Ref Range    WBC 11.0 (H) 4.8 - 10.8 K/uL    RBC 4.93 4.70 - 6.10 M/uL    Hemoglobin 13.8 (L) 14.0 - 18.0 g/dL    Hematocrit 41.5 (L) 42.0 - 52.0 %    MCV 84.2 81.4 - 97.8 fL    MCH 28.0 27.0 - 33.0 pg    MCHC 33.3 (L) 33.7 - 35.3 g/dL    RDW 45.1 35.9 - 50.0 fL    Platelet Count 356 164 - 446 K/uL    MPV 10.2 9.0 - 12.9 fL    Neutrophils-Polys 70.90 44.00 - 72.00 %    Lymphocytes 21.40 (L) 22.00 - 41.00 %    Monocytes 5.10 0.00 - 13.40 %    Eosinophils 0.00 0.00 - 6.90 %    Basophils 0.00 0.00 - 1.80 %    Nucleated RBC 0.00 /100 WBC    Neutrophils (Absolute) 7.80 (H) 1.82 - 7.42 K/uL    Lymphs (Absolute) 2.35 1.00 - 4.80 K/uL    Monos (Absolute) 0.56 0.00 - 0.85 K/uL    Eos (Absolute) 0.00 0.00 - 0.51 K/uL    Baso (Absolute) 0.00 0.00 - 0.12 K/uL    NRBC (Absolute) 0.00 K/uL    Anisocytosis 2+ (A)     Microcytosis 2+ (A)    COMP METABOLIC PANEL   Result Value Ref Range    Sodium 130 (L) 135 - 145 mmol/L    Potassium 4.7 3.6 - 5.5 mmol/L    Chloride 93 (L) 96 - 112 mmol/L    Co2 23 20 - 33 mmol/L    Anion Gap 14.0 7.0 - 16.0    Glucose 658 (HH) 65 - 99 mg/dL    Bun 25 (H) 8 - 22 mg/dL    Creatinine 1.13 0.50 - 1.40 mg/dL    Calcium 9.8 8.5 - 10.5 mg/dL    AST(SGOT) 10 (L) 12 - 45 U/L    ALT(SGPT) 11 2 - 50 U/L    Alkaline Phosphatase 62 30 - 99 U/L    Total Bilirubin <0.2 0.1 - 1.5 mg/dL    Albumin 4.3 3.2 - 4.9 g/dL    Total Protein 7.0 6.0 - 8.2 g/dL    Globulin 2.7 1.9 - 3.5 g/dL    A-G Ratio 1.6 g/dL   MAGNESIUM   Result Value Ref Range    Magnesium 2.1 1.5 - 2.5 mg/dL   PHOSPHORUS   Result Value Ref Range    Phosphorus 2.5 2.5 - 4.5 mg/dL    BETA-HYDROXYBUTYRIC ACID   Result Value Ref Range    beta-Hydroxybutyric Acid 0.26 0.02 - 0.27 mmol/L   OSMOLALITY SERUM   Result Value Ref Range    Osmolality Serum 320 (H) 278 - 298 mOsm/kg H2O   VENOUS BLOOD GAS   Result Value Ref Range    Venous Bg Ph 7.41 7.31 - 7.45    Venous Bg Pco2 36.1 (L) 41.0 - 51.0 mmHg    Venous Bg Po2 33.8 25.0 - 40.0 mmHg    Venous Bg O2 Saturation 61.9 %    Venous Bg Hco3 22 (L) 24 - 28 mmol/L    Venous Bg Base Excess -2 mmol/L    Body Temp see below Centigrade   DIFFERENTIAL MANUAL   Result Value Ref Range    Metamyelocytes 0.90 %    Myelocytes 1.70 %    Manual Diff Status PERFORMED    PERIPHERAL SMEAR REVIEW   Result Value Ref Range    Peripheral Smear Review see below    PLATELET ESTIMATE   Result Value Ref Range    Plt Estimation Normal    MORPHOLOGY   Result Value Ref Range    RBC Morphology Present     Toxic Gran Slight     Dohle Bodies Few    URINALYSIS    Specimen: Urine   Result Value Ref Range    Color Yellow     Character Clear     Specific Gravity 1.033 <1.035    Ph 7.0 5.0 - 8.0    Glucose >=1000 (A) Negative mg/dL    Ketones Negative Negative mg/dL    Protein Negative Negative mg/dL    Bilirubin Negative Negative    Urobilinogen, Urine 0.2 Negative    Nitrite Negative Negative    Leukocyte Esterase Negative Negative    Occult Blood Negative Negative    Micro Urine Req see below    ESTIMATED GFR   Result Value Ref Range    GFR If African American >60 >60 mL/min/1.73 m 2    GFR If Non African American >60 >60 mL/min/1.73 m 2   EKG (NOW)   Result Value Ref Range    Report       [facility] Emergency Dept.    Test Date:  2021  Pt Name:    HOLLY KIM                 Department: ER  MRN:        [id number]                      Room:       RD 12  Gender:     Male                         Technician: [id number]  :        1980                   Requested By:RED VILLAREAL  Order #:    [id number]                    Philippe MD:    Measurements  Intervals                                 Axis  Rate:       74                           P:          50  ME:         176                          QRS:        54  QRSD:       116                          T:          46  QT:         368  QTc:        409    Interpretive Statements  SINUS RHYTHM  NONSPECIFIC INTRAVENTRICULAR CONDUCTION DELAY  Compared to ECG 06/19/2021 16:41:51  No significant changes     POCT glucose device results   Result Value Ref Range    Glucose - Accu-Ck >600 (HH) 65 - 99 mg/dL   POCT glucose device results   Result Value Ref Range    Glucose - Accu-Ck 514 (HH) 65 - 99 mg/dL   POCT glucose device results   Result Value Ref Range    Glucose - Accu-Ck 384 (H) 65 - 99 mg/dL   POCT glucose device results   Result Value Ref Range    Glucose - Accu-Ck 367 (H) 65 - 99 mg/dL       RADIOLOGY  No orders to display         COURSE & MEDICAL DECISION MAKING  Pertinent Labs & Imaging studies reviewed. (See chart for details)  41-year-old male presented emergency room with elevated blood sugar. History as above. No evidence of DKA. After ER correction with fluid hydration and insulin patient has had significant glycemic improvement from greater than 600 down to 300. Again patient is now feeling significant better after this resuscitation and care. Will discharge back to his regular care providers but is understanding return precautions here the ER if needed. He is understanding of need to continue to check his blood sugars closely return with any changes or worsening.       The patient will return for worsening symptoms and is stable at the time of discharge. The patient verbalizes understanding and will comply.    FINAL IMPRESSION  1. Hyperglycemia            Electronically signed by: Adonis Bustos M.D., 7/1/2021 8:44 PM

## 2021-07-02 NOTE — ED NOTES
"Med Rec completed: per patient at bedside and [facility] Medication list (copy in paper chart)  [facility]  Allergies:  Allergies   Allergen Reactions   • Geodon [Ziprasidone Hcl] Anaphylaxis     Anaphylaxis per patient   • Abilify      \"Feeling tired, like I don't even know whats going on around me\"   • Fish      Pt reports fish causes him to be sick to his stomach  Not listed on MAR noted 2/3/2021         No ORAL antibiotics in last 14 days    Home Medications:    Medication Sig Comments   • metformin (GLUCOPHAGE) 1000 MG tablet Take 1,000 mg by mouth 2 times a day with meals.    • docusate sodium (COLACE) 100 MG Cap Take 100 mg by mouth every day.    • levothyroxine (SYNTHROID) 200 MCG Tab Take 200 mcg by mouth every morning on an empty stomach.   Take in addition to 25 mcg tablet for daily dose of 225 mcg    • divalproex (DEPAKOTE) 500 MG Tablet Delayed Response Take 500-1,500 mg by mouth 2 times a day.   1 tablet = 500 mg every morning  3 tablets = 1500 mg every evening    • SITagliptin (JANUVIA) 100 MG Tab Take 100 mg by mouth every day.    • Fenofibrate 134 MG Cap Take 134 mg by mouth every day.    • albuterol 108 (90 Base) MCG/ACT Aero Soln inhalation aerosol Inhale 2 Puffs every 6 hours as needed for Shortness of Breath.    • sertraline (ZOLOFT) 100 MG Tab Take 100 mg by mouth every day.    • levothyroxine (SYNTHROID) 25 MCG Tab Take 200 mcg by mouth every morning on an empty stomach.   Take in addition to 200 mcg tablet for daily dose of 225 mcg    • Empagliflozin (JARDIANCE) 25 MG Tab Take 25 mg by mouth every day.    • budesonide-formoterol (SYMBICORT) 160-4.5 MCG/ACT Aerosol Inhale 2 Puffs 2 Times a Day.    • lurasidone (LATUDA) 20 MG Tab Take 20 mg by mouth every bedtime.    • atorvastatin (LIPITOR) 10 MG Tab Take 10 mg by mouth every evening.    • lisinopril (PRINIVIL) 10 MG Tab Take 10 mg by mouth every day.    • Cholecalciferol (VITAMIN D3) 2000 UNIT Cap Take 4,000 Units by mouth at " bedtime.   2 caps = 4,000 units    • prazosin (MINIPRESS) 2 MG Cap Take 4 mg by mouth at bedtime.   2 caps = 4 mg    • montelukast (SINGULAIR) 10 MG Tab Take 10 mg by mouth every bedtime.    • Omega-3 Fatty Acids (FISH OIL) 1000 MG Cap capsule Take 1,000 mg by mouth 2 Times a Day.    • insulin glargine (BASAGLAR KWIKPEN) 100 UNIT/ML injection PEN Inject 30 Units under the skin 2 times a day.    • ondansetron (ZOFRAN ODT) 4 MG TABLET DISPERSIBLE  Take 4 mg by mouth every 6 hours as needed. Indications: Nausea and Vomiting      **patient reports he got all his medications today before coming in. At 0700 and about 1900

## 2021-07-02 NOTE — ED NOTES
Discharge education provided. Patient verbalizes understanding. Patient A/Ox4 and ambulatory to the lobby with a steady gait. Patient discharged home to self care.

## 2021-07-02 NOTE — ED TRIAGE NOTES
Chief Complaint   Patient presents with   • Hyperglycemia     Patient ella stroud from [facility] patient reports finger stick blood sugar of 567. Patient states that he takes 30 units of lantis BID. Says that he feels weak and sluggish.

## 2021-07-12 ENCOUNTER — APPOINTMENT (OUTPATIENT)
Dept: RADIOLOGY | Facility: MEDICAL CENTER | Age: 41
End: 2021-07-12
Attending: EMERGENCY MEDICINE
Payer: MEDICAID

## 2021-07-12 ENCOUNTER — HOSPITAL ENCOUNTER (EMERGENCY)
Facility: MEDICAL CENTER | Age: 41
End: 2021-07-12
Attending: EMERGENCY MEDICINE
Payer: MEDICAID

## 2021-07-12 VITALS
TEMPERATURE: 98.9 F | OXYGEN SATURATION: 96 % | HEIGHT: 78 IN | WEIGHT: 289.46 LBS | HEART RATE: 80 BPM | BODY MASS INDEX: 33.49 KG/M2 | DIASTOLIC BLOOD PRESSURE: 78 MMHG | RESPIRATION RATE: 18 BRPM | SYSTOLIC BLOOD PRESSURE: 121 MMHG

## 2021-07-12 DIAGNOSIS — R22.1 MULTIPLE MASSES OF NECK: ICD-10-CM

## 2021-07-12 DIAGNOSIS — R22.1 NECK SWELLING: ICD-10-CM

## 2021-07-12 LAB
ALBUMIN SERPL BCP-MCNC: 4.5 G/DL (ref 3.2–4.9)
ALBUMIN/GLOB SERPL: 1.9 G/DL
ALP SERPL-CCNC: 51 U/L (ref 30–99)
ALT SERPL-CCNC: 8 U/L (ref 2–50)
ANION GAP SERPL CALC-SCNC: 16 MMOL/L (ref 7–16)
AST SERPL-CCNC: 11 U/L (ref 12–45)
BASOPHILS # BLD AUTO: 0.9 % (ref 0–1.8)
BASOPHILS # BLD: 0.1 K/UL (ref 0–0.12)
BILIRUB SERPL-MCNC: <0.2 MG/DL (ref 0.1–1.5)
BUN SERPL-MCNC: 24 MG/DL (ref 8–22)
CALCIUM SERPL-MCNC: 9.3 MG/DL (ref 8.4–10.2)
CHLORIDE SERPL-SCNC: 97 MMOL/L (ref 96–112)
CO2 SERPL-SCNC: 22 MMOL/L (ref 20–33)
CREAT SERPL-MCNC: 1.03 MG/DL (ref 0.5–1.4)
EOSINOPHIL # BLD AUTO: 0.12 K/UL (ref 0–0.51)
EOSINOPHIL NFR BLD: 1 % (ref 0–6.9)
ERYTHROCYTE [DISTWIDTH] IN BLOOD BY AUTOMATED COUNT: 45.8 FL (ref 35.9–50)
GLOBULIN SER CALC-MCNC: 2.4 G/DL (ref 1.9–3.5)
GLUCOSE SERPL-MCNC: 228 MG/DL (ref 65–99)
HCT VFR BLD AUTO: 40.9 % (ref 42–52)
HGB BLD-MCNC: 13.5 G/DL (ref 14–18)
IMM GRANULOCYTES # BLD AUTO: 0.21 K/UL (ref 0–0.11)
IMM GRANULOCYTES NFR BLD AUTO: 1.8 % (ref 0–0.9)
LYMPHOCYTES # BLD AUTO: 3.11 K/UL (ref 1–4.8)
LYMPHOCYTES NFR BLD: 26.9 % (ref 22–41)
MCH RBC QN AUTO: 28.1 PG (ref 27–33)
MCHC RBC AUTO-ENTMCNC: 33 G/DL (ref 33.7–35.3)
MCV RBC AUTO: 85.2 FL (ref 81.4–97.8)
MONOCYTES # BLD AUTO: 1.11 K/UL (ref 0–0.85)
MONOCYTES NFR BLD AUTO: 9.6 % (ref 0–13.4)
NEUTROPHILS # BLD AUTO: 6.93 K/UL (ref 1.82–7.42)
NEUTROPHILS NFR BLD: 59.8 % (ref 44–72)
NRBC # BLD AUTO: 0 K/UL
NRBC BLD-RTO: 0 /100 WBC
PLATELET # BLD AUTO: 351 K/UL (ref 164–446)
PMV BLD AUTO: 9.7 FL (ref 9–12.9)
POTASSIUM SERPL-SCNC: 4.2 MMOL/L (ref 3.6–5.5)
PROT SERPL-MCNC: 6.9 G/DL (ref 6–8.2)
RBC # BLD AUTO: 4.8 M/UL (ref 4.7–6.1)
SODIUM SERPL-SCNC: 135 MMOL/L (ref 135–145)
TSH SERPL DL<=0.005 MIU/L-ACNC: 2.53 UIU/ML (ref 0.38–5.33)
WBC # BLD AUTO: 11.6 K/UL (ref 4.8–10.8)

## 2021-07-12 PROCEDURE — 70491 CT SOFT TISSUE NECK W/DYE: CPT

## 2021-07-12 PROCEDURE — 80053 COMPREHEN METABOLIC PANEL: CPT

## 2021-07-12 PROCEDURE — 99283 EMERGENCY DEPT VISIT LOW MDM: CPT

## 2021-07-12 PROCEDURE — 84443 ASSAY THYROID STIM HORMONE: CPT

## 2021-07-12 PROCEDURE — 85025 COMPLETE CBC W/AUTO DIFF WBC: CPT

## 2021-07-12 PROCEDURE — 700117 HCHG RX CONTRAST REV CODE 255: Performed by: EMERGENCY MEDICINE

## 2021-07-12 RX ADMIN — IOHEXOL 80 ML: 350 INJECTION, SOLUTION INTRAVENOUS at 18:06

## 2021-07-12 ASSESSMENT — FIBROSIS 4 INDEX: FIB4 SCORE: 0.33

## 2021-07-13 NOTE — ED NOTES
IV discontinued with cathlon intact    Discharge home with instructions and follow up care reviewed and given to patient with verbal understanding.      Ambulatory with a steady gait and stable condition.

## 2021-07-13 NOTE — ED PROVIDER NOTES
ED Provider Note    Chief Complaint:   Neck swelling, dysphagia    HPI:  Norm Prabhakar is a very pleasant 41-year-old gentleman who presents to the emergency department today for evaluation of neck swelling and dysphagia.  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  Over the past week he has had progressively worsening pain associated with an anterior neck mass that has been progressively increasing in size.  Over the past week he began to develop difficulty swallowing, he states he feels as though food is actually getting stuck in his throat, and he is having to swallow multiple times to pass the food.  As result, he is eating less than usual, though he has not noticed any significant weight loss.  States that due to difficulty eating, increasing pain, came to the emergency department for further evaluation.  He said no associated fevers, no cough, no difficulty breathing or shortness of breath.  He reports no chest pain and no epigastric pain with eating.  He has had no associated vomiting.  He is unable to identify any alleviating factors, symptoms are exacerbated by eating.    Review of Systems:  See HPI for pertinent positives and negatives. All other systems negative.    Past Medical History:   has a past medical history of Anxiety, ASTHMA, Bipolar 1 disorder (), Depression, Diabetes (), Fall, Glaucoma, Glaucoma (), Hypothyroidism, Indigestion, Mental disorder, Murmur, Pneumonia, Psychiatric problem (), S/P thyroidectomy, Seizure (HCC) (), Seizure disorder ([facility]), and Unspecified disorder of thyroid.    Social History:  Social History     Tobacco Use   • Smoking status: Former Smoker     Packs/day: 0.25     Types: Cigarettes, Cigars     Quit date: 2020     Years since quittin.2   • Smokeless tobacco: Never Used   • Tobacco  "comment: 3 cigarettes a day   Vaping Use   • Vaping Use: Former   Substance and Sexual Activity   • Alcohol use: Not Currently   • Drug use: Yes     Types: Inhaled     Comment: marijuana   • Sexual activity: Not on file       Surgical History:   has a past surgical history that includes eye surgery; thyroid lobectomy; and other.    Current Medications:  Home Medications    **Home medications have not yet been reviewed for this encounter**         Allergies:  Allergies   Allergen Reactions   • Geodon [Ziprasidone Hcl] Anaphylaxis     Anaphylaxis per patient   • Abilify      \"Feeling tired, like I don't even know whats going on around me\"   • Fish      Pt reports fish causes him to be sick to his stomach  Not listed on MAR noted 2/3/2021         Physical Exam:  Vital Signs: /78   Pulse 80   Temp 37.2 °C (98.9 °F) (Temporal)   Resp 18   Ht 1.981 m (6' 6\")   Wt (!) 131 kg (289 lb 7.4 oz)   SpO2 96%   BMI 33.45 kg/m²   Constitutional: Alert, no acute distress  HENT: Normocephalic, posterior pharynx is normal-appearing, uvula midline, no oropharyngeal edema noted  Eyes: Pupils equal and reactive, normal conjunctiva  Neck: Supple, normal range of motion, no stridor, he does have a mass to the anterior neck, possibly arising from the thyroid, it appears to be slightly left of midline and is tender to palpation, this is not fluctuant, with no overlying skin changes, no cellulitis, mass is nonpulsatile  Cardiovascular: Extremities are warm and well perfused  Pulmonary: No respiratory distress, normal work of breathing  Abdomen: Soft, non-distended  Skin: Warm, dry, no rashes or lesions  Musculoskeletal: Normal range of motion in all extremities, no swelling or deformity noted  Neurologic: Alert, oriented, normal speech, normal motor function  Psychiatric: Normal and appropriate mood and affect    Medical records reviewed for continuity of care.  No recent visits for similar symptoms.  Mr. Prabhakar was seen in this " emergency department most recently on 7/1/2021, 12 days ago, for evaluation of elevated blood sugar.  Noted that he was currently living at [facility] at that time.    Labs:  Labs Reviewed   CBC WITH DIFFERENTIAL - Abnormal; Notable for the following components:       Result Value    WBC 11.6 (*)     Hemoglobin 13.5 (*)     Hematocrit 40.9 (*)     MCHC 33.0 (*)     Immature Granulocytes 1.80 (*)     Monos (Absolute) 1.11 (*)     Immature Granulocytes (abs) 0.21 (*)     All other components within normal limits   COMP METABOLIC PANEL - Abnormal; Notable for the following components:    Glucose 228 (*)     Bun 24 (*)     AST(SGOT) 11 (*)     All other components within normal limits   TSH WITH REFLEX TO FT4   ESTIMATED GFR       Radiology:  CT-SOFT TISSUE NECK WITH   Final Result      1.  Mass located along the thyroid cartilage to the right of midline measures 3.1 x 2.3 x 3.3 cm and is minimally increased in size. The more inferior mass anterior to the trachea measures 1.6 x 1 cm and is not significantly changed. 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   2.  Borderline prominent left cervical lymph node is nonspecific and may be reactive.   3.  Minimal atherosclerotic plaque in the proximal right ICA.           ED Medications Administered:  Medications   iohexol (OMNIPAQUE) 350 mg/mL (80 mL Intravenous Given 7/12/21 1806)       Differential diagnosis:  Thyroid mass, head and neck malignancy, ectopic thyroid tissue, less likely infection    MDM:  Mr. Prabhakar presents to the emergency department today for evaluation of progressively worsening neck pain, neck mass increasing in size, difficulty swallowing.  On arrival to the emergency department his vital signs are reassuring, he has normal oxygen saturation, no evidence of airway compromise.  He is afebrile, vital signs do not show any evidence of Sirs or sepsis.     On laboratory evaluation his white blood count is just above normal reference range at  11.6, no bands resulted at this time.  CMP is notable for glucose of 228 consistent with his history of diabetes.  No electrolyte abnormalities noted, creatinine is within normal limits.  Screening TSH is within normal limits.    Due to progressively worsening neck mass now interfering with ability to swallow, I did obtain a CT soft tissue neck with contrast.  This demonstrates a mass located along the thyroid cartilage to the right of midline measuring 3.1 x 2.3 x 3.3 cm which is minimally increased in size.  More inferior mass anterior to the trachea measures 1.6 x 1 cm and is not significantly changed.  There is a borderline prominent left cervical lymph node, nonspecific, may be reactive.    At this time, he does have progressively enlarging masses, possibly ectopic thyroid tissue.  No evidence of airway compromise.  He will likely require biopsy and specialist referral.  Plan at this time is for referral to his primary care practitioner to coordinate biopsy, and subspecialist referral as needed.  He is counseled to call tomorrow morning to discuss his diagnosis, review his emergency department visit, and schedule a close outpatient follow-up.  Family is concerned that his primary care nurse practitioner is not particularly responsive, I have provided follow-up information for general surgery who should also be able to coordinate further evaluation and biopsy if necessary.  Return precautions were discussed with the patient, and provided in written form with the patient's discharge instructions.     Personal protective equipment including N95 surgical respirator, goggles, and gloves were used during this encounter.       Disposition:  Discharge home in stable condition    Final Impression:  1. Neck swelling    2. Multiple masses of neck        Electronically signed by: Blank Kirkland MD, 7/12/2021 7:58 PM

## 2021-07-13 NOTE — DISCHARGE INSTRUCTIONS
It is very important that you contact your primary care practitioner tomorrow morning to schedule a close follow-up visit.  Please let them know that you were diagnosed with some masses along the thyroid cartilage of the neck, and the require a close follow-up visit within 1 week for reassessment, and assistance with scheduling further diagnostics including biopsy or imaging, as well as subspecialist referral.  If you are not able to schedule his follow-up appointment within 1 week, please return to the emergency department in the morning during business hours for complete recheck and further assistance.

## 2021-07-19 ENCOUNTER — APPOINTMENT (OUTPATIENT)
Dept: RADIOLOGY | Facility: MEDICAL CENTER | Age: 41
End: 2021-07-19
Attending: EMERGENCY MEDICINE
Payer: MEDICAID

## 2021-07-19 ENCOUNTER — HOSPITAL ENCOUNTER (EMERGENCY)
Facility: MEDICAL CENTER | Age: 41
End: 2021-07-20
Attending: EMERGENCY MEDICINE
Payer: MEDICAID

## 2021-07-19 DIAGNOSIS — R56.9 SEIZURE (HCC): ICD-10-CM

## 2021-07-19 LAB
ALBUMIN SERPL BCP-MCNC: 4.1 G/DL (ref 3.2–4.9)
ALBUMIN/GLOB SERPL: 1.9 G/DL
ALP SERPL-CCNC: 45 U/L (ref 30–99)
ALT SERPL-CCNC: 8 U/L (ref 2–50)
ANION GAP SERPL CALC-SCNC: 17 MMOL/L (ref 7–16)
AST SERPL-CCNC: 16 U/L (ref 12–45)
BASOPHILS # BLD AUTO: 0.8 % (ref 0–1.8)
BASOPHILS # BLD: 0.08 K/UL (ref 0–0.12)
BILIRUB SERPL-MCNC: 0.2 MG/DL (ref 0.1–1.5)
BUN SERPL-MCNC: 24 MG/DL (ref 8–22)
CALCIUM SERPL-MCNC: 8.2 MG/DL (ref 8.5–10.5)
CHLORIDE SERPL-SCNC: 98 MMOL/L (ref 96–112)
CO2 SERPL-SCNC: 21 MMOL/L (ref 20–33)
CREAT SERPL-MCNC: 1.26 MG/DL (ref 0.5–1.4)
EOSINOPHIL # BLD AUTO: 0.14 K/UL (ref 0–0.51)
EOSINOPHIL NFR BLD: 1.4 % (ref 0–6.9)
ERYTHROCYTE [DISTWIDTH] IN BLOOD BY AUTOMATED COUNT: 46.7 FL (ref 35.9–50)
ETHANOL BLD-MCNC: <10.1 MG/DL (ref 0–10)
GLOBULIN SER CALC-MCNC: 2.2 G/DL (ref 1.9–3.5)
GLUCOSE SERPL-MCNC: 167 MG/DL (ref 65–99)
HCT VFR BLD AUTO: 40.2 % (ref 42–52)
HGB BLD-MCNC: 13.3 G/DL (ref 14–18)
IMM GRANULOCYTES # BLD AUTO: 0.19 K/UL (ref 0–0.11)
IMM GRANULOCYTES NFR BLD AUTO: 1.9 % (ref 0–0.9)
LYMPHOCYTES # BLD AUTO: 3.88 K/UL (ref 1–4.8)
LYMPHOCYTES NFR BLD: 38.2 % (ref 22–41)
MCH RBC QN AUTO: 28.1 PG (ref 27–33)
MCHC RBC AUTO-ENTMCNC: 33.1 G/DL (ref 33.7–35.3)
MCV RBC AUTO: 84.8 FL (ref 81.4–97.8)
MONOCYTES # BLD AUTO: 1.04 K/UL (ref 0–0.85)
MONOCYTES NFR BLD AUTO: 10.2 % (ref 0–13.4)
NEUTROPHILS # BLD AUTO: 4.84 K/UL (ref 1.82–7.42)
NEUTROPHILS NFR BLD: 47.5 % (ref 44–72)
NRBC # BLD AUTO: 0 K/UL
NRBC BLD-RTO: 0 /100 WBC
PLATELET # BLD AUTO: 294 K/UL (ref 164–446)
PMV BLD AUTO: 10 FL (ref 9–12.9)
POTASSIUM SERPL-SCNC: 3.9 MMOL/L (ref 3.6–5.5)
PROT SERPL-MCNC: 6.3 G/DL (ref 6–8.2)
RBC # BLD AUTO: 4.74 M/UL (ref 4.7–6.1)
SODIUM SERPL-SCNC: 136 MMOL/L (ref 135–145)
VALPROATE SERPL-MCNC: 30.3 UG/ML (ref 50–100)
WBC # BLD AUTO: 10.2 K/UL (ref 4.8–10.8)

## 2021-07-19 PROCEDURE — 96374 THER/PROPH/DIAG INJ IV PUSH: CPT

## 2021-07-19 PROCEDURE — 700111 HCHG RX REV CODE 636 W/ 250 OVERRIDE (IP): Performed by: EMERGENCY MEDICINE

## 2021-07-19 PROCEDURE — 70450 CT HEAD/BRAIN W/O DYE: CPT

## 2021-07-19 PROCEDURE — 99285 EMERGENCY DEPT VISIT HI MDM: CPT

## 2021-07-19 PROCEDURE — 80164 ASSAY DIPROPYLACETIC ACD TOT: CPT

## 2021-07-19 PROCEDURE — 85025 COMPLETE CBC W/AUTO DIFF WBC: CPT

## 2021-07-19 PROCEDURE — 700102 HCHG RX REV CODE 250 W/ 637 OVERRIDE(OP): Performed by: EMERGENCY MEDICINE

## 2021-07-19 PROCEDURE — 80053 COMPREHEN METABOLIC PANEL: CPT

## 2021-07-19 PROCEDURE — A9270 NON-COVERED ITEM OR SERVICE: HCPCS | Performed by: EMERGENCY MEDICINE

## 2021-07-19 PROCEDURE — 82077 ASSAY SPEC XCP UR&BREATH IA: CPT

## 2021-07-19 RX ORDER — DIVALPROEX SODIUM 500 MG/1
1000 TABLET, DELAYED RELEASE ORAL ONCE
Status: COMPLETED | OUTPATIENT
Start: 2021-07-20 | End: 2021-07-20

## 2021-07-19 RX ORDER — LORAZEPAM 2 MG/ML
0.5 INJECTION INTRAMUSCULAR ONCE
Status: COMPLETED | OUTPATIENT
Start: 2021-07-19 | End: 2021-07-19

## 2021-07-19 RX ORDER — DIVALPROEX SODIUM 500 MG/1
500 TABLET, DELAYED RELEASE ORAL ONCE
Status: COMPLETED | OUTPATIENT
Start: 2021-07-19 | End: 2021-07-19

## 2021-07-19 RX ADMIN — LORAZEPAM 0.5 MG: 2 INJECTION INTRAMUSCULAR; INTRAVENOUS at 22:12

## 2021-07-19 RX ADMIN — DIVALPROEX SODIUM 500 MG: 500 TABLET, DELAYED RELEASE ORAL at 23:15

## 2021-07-19 ASSESSMENT — LIFESTYLE VARIABLES
DOES PATIENT WANT TO STOP DRINKING: NO
TOTAL SCORE: 0
DO YOU DRINK ALCOHOL: NO
EVER HAD A DRINK FIRST THING IN THE MORNING TO STEADY YOUR NERVES TO GET RID OF A HANGOVER: NO
TOTAL SCORE: 0
HAVE PEOPLE ANNOYED YOU BY CRITICIZING YOUR DRINKING: NO
TOTAL SCORE: 0
CONSUMPTION TOTAL: INCOMPLETE
HAVE YOU EVER FELT YOU SHOULD CUT DOWN ON YOUR DRINKING: NO
EVER FELT BAD OR GUILTY ABOUT YOUR DRINKING: NO

## 2021-07-19 ASSESSMENT — FIBROSIS 4 INDEX: FIB4 SCORE: 0.43

## 2021-07-20 VITALS
HEART RATE: 78 BPM | RESPIRATION RATE: 16 BRPM | BODY MASS INDEX: 34.59 KG/M2 | SYSTOLIC BLOOD PRESSURE: 132 MMHG | WEIGHT: 299 LBS | OXYGEN SATURATION: 93 % | HEIGHT: 78 IN | TEMPERATURE: 97.6 F | DIASTOLIC BLOOD PRESSURE: 71 MMHG

## 2021-07-20 PROCEDURE — 81003 URINALYSIS AUTO W/O SCOPE: CPT

## 2021-07-20 PROCEDURE — A9270 NON-COVERED ITEM OR SERVICE: HCPCS | Performed by: EMERGENCY MEDICINE

## 2021-07-20 PROCEDURE — 700102 HCHG RX REV CODE 250 W/ 637 OVERRIDE(OP): Performed by: EMERGENCY MEDICINE

## 2021-07-20 RX ADMIN — DIVALPROEX SODIUM 1000 MG: 500 TABLET, DELAYED RELEASE ORAL at 00:00

## 2021-07-20 NOTE — ED PROVIDER NOTES
ED Provider Note    Chief Complaint:   Seizure    HPI:  Norm Prabhakar is a very pleasant 31-year-old gentleman who presents to the emergency department for evaluation of breakthrough seizures in the setting of known seizure disorder.  He does have a longstanding history of seizure disorder, currently taking Depakote.  He states he has not missed any doses of his medication.  He reports that he will typically have 1-2 breakthrough seizures per month.  Today, he reports that he had 4 seizures, all witnessed by his roommate.  He had 2 seizures this morning, and 2 seizures this evening.  Seizures were self-limited, he does not believe any medications were given to terminate the seizures.  He does not recall the event, but states that he does have a headache, as well as some pain to the occipital portion of the head where he believes he struck his head.  He reports that he was confused after the seizure, and did have a headache, which is common for him.  He is not had any associated recent fevers, no nausea or vomiting, no dysuria.  He has had no recent flulike illness.  He has no associated chest pain, no shortness of breath, no abdominal pain.  He states that he still feels somewhat slowed, consistent with prior postictal states.  He is unable to identify any exacerbating or alleviating factors.    Review of Systems:  See HPI for pertinent positives and negatives. All other systems negative.    Past Medical History:   has a past medical history of Anxiety, ASTHMA, Bipolar 1 disorder ([facility]), Depression, Diabetes ([facility]), Fall, Glaucoma, Glaucoma (1982), Hypothyroidism, Indigestion, Mental disorder, Murmur, Pneumonia, Psychiatric problem (2002), S/P thyroidectomy, Seizure ([facility]) (2010), Seizure disorder ([facility]), and Unspecified disorder of thyroid.    Social History:  Social History     Tobacco Use   • Smoking status: Former Smoker     Packs/day: 0.25     Types: Cigarettes, Cigars     Quit date: 4/1/2020     Years since  "quittin.3   • Smokeless tobacco: Never Used   • Tobacco comment: 3 cigarettes a day   Vaping Use   • Vaping Use: Former   Substance and Sexual Activity   • Alcohol use: Not Currently   • Drug use: Yes     Types: Inhaled     Comment: marijuana   • Sexual activity: Not on file       Surgical History:   has a past surgical history that includes eye surgery; thyroid lobectomy; and other.    Current Medications:  Home Medications    **Home medications have not yet been reviewed for this encounter**         Allergies:  Allergies   Allergen Reactions   • Geodon [Ziprasidone Hcl] Anaphylaxis     Anaphylaxis per patient   • Abilify      \"Feeling tired, like I don't even know whats going on around me\"   • Fish      Pt reports fish causes him to be sick to his stomach  Not listed on MAR noted 2/3/2021         Physical Exam:  Vital Signs: /79   Pulse 80   Temp 36.4 °C (97.6 °F) (Oral)   Resp 16   Ht 1.981 m (6' 6\")   Wt (!) 136 kg (299 lb)   SpO2 92%   BMI 34.55 kg/m²   Constitutional: Alert, no acute distress  HENT: Normocephalic, mask in place  Eyes: Left pupil is 3 mm and fixed consistent with remote history of blindness and prior ophthalmologic surgery, eyes are not symmetric which patient reports is baseline.  Right pupil is 4 mm and reactive, normal extraocular movement.  Neck: Supple, normal range of motion, no stridor  Cardiovascular: Extremities are warm and well perfused, no murmur appreciated, normal cardiac auscultation  Pulmonary: No respiratory distress, normal work of breathing, no accessory muscule usage, breath sounds clear and equal bilaterally  Abdomen: Soft, non-distended, non-tender to palpation, no peritoneal signs  Skin: Warm, dry, no rashes or lesions  Musculoskeletal: Normal range of motion in all extremities, no swelling or deformity noted  Neurologic: CN II-XII intact, speech is somewhat slowed, but not slurred, no aphasia, normal orientation, muscle strength 5/5 in all four " extremities, sensation grossly intact, normal finger-to-nose, no pronator drift  Psychiatric: Normal and appropriate mood and affect    Medical records reviewed for continuity of care.  Mr. Prabhakar has a past medical history significant for asthma, diabetes, hypothyroidism, and seizure disorder.  On review of his medication list, he is noted to be on Depakote for seizures.  He was most recently seen in this emergency department for evaluation of neck swelling, was found to have masses of possible ectopic thyroid tissue, previously known progressively enlarging.  He was referred to general surgery, as well as primary care for follow-up.  He has been seen multiple times for hyperglycemia.  He does not have any recent visits for evaluation of seizure.    Labs:  Labs Reviewed   CBC WITH DIFFERENTIAL - Abnormal; Notable for the following components:       Result Value    Hemoglobin 13.3 (*)     Hematocrit 40.2 (*)     MCHC 33.1 (*)     Immature Granulocytes 1.90 (*)     Monos (Absolute) 1.04 (*)     Immature Granulocytes (abs) 0.19 (*)     All other components within normal limits   COMP METABOLIC PANEL - Abnormal; Notable for the following components:    Anion Gap 17.0 (*)     Glucose 167 (*)     Bun 24 (*)     Calcium 8.2 (*)     All other components within normal limits   VALPROIC ACID - Abnormal; Notable for the following components:    Valproic Acid 30.3 (*)     All other components within normal limits   DIAGNOSTIC ALCOHOL   ESTIMATED GFR   URINALYSIS       Radiology:  CT-HEAD W/O   Final Result         1.  No acute intracranial abnormality.   2.  Extensive periventricular and subcortical white matter low-attenuation changes, findings most typical of small vessel ischemia, however findings are nonspecific and given patient's relatively young age other causes of underlying white matter disease    should be considered with additional workup as clinically appropriate.  Findings are stable since prior study.           ED  Medications Administered:  Medications   LORazepam (ATIVAN) injection 0.5 mg (0.5 mg Intravenous Given 7/19/21 2212)   divalproex (DEPAKOTE) delayed-release tablet 500 mg (500 mg Oral Given 7/19/21 2315)   divalproex (DEPAKOTE) delayed-release tablet 1,000 mg (1,000 mg Oral Given 7/20/21 0000)       Differential diagnosis:  Breakthrough seizure, medication nonadherence, underlying infection, less likely medication side effect    MDM:  Mr. Prabhakar presents to the emergency department today for evaluation of 4 seizures that occurred today.  This is unusual for him, as he typically reports only 1-2 breakthrough seizures per month.  On arrival to the emergency department he is afebrile, he has no tachycardia, no hypotension.  He has no evidence of Sirs or sepsis with regard to his vital signs.  He has no focal neurologic deficits, his speech is somewhat slowed consistent with postictal state, he reports that his confusion has progressively improved since his most recent seizure, also consistent with postictal state.  He does not report any tramadol usage.    On laboratory evaluation, valproic acid level is 30.3, which is subtherapeutic.  He has a normal white blood count, vital signs are reassuring, less concerning for previously unrecognized infection.  Hemoglobin is 13.3, this is consistent with prior values.  Glucose is 167, consistent with his history of diabetes.  Anion gap slightly elevated at 17.  Electrolytes are within normal limits, no evidence of renal insufficiency.  Diagnostic alcohol level is negative consistent with his reported history.      CT head obtained due to evidence of closed head injury, and reported injury with fall.  This is negative for acute intracranial abnormality.  Extensive chronic periventricular and subcortical white matter low-attenuation changes noted, most typical of small vessel ischemia.  Findings stable since prior study.    He was treated with 0.5 mg of Ativan on arrival to the  emergency department in attempt to prevent further seizures.  After valproic acid level returned low, he was provided with his evening dose of Depakote, 1500 mg, which he states he did not take yet today.  I suspect low valproic acid level is the reason for his seizure today.  On reassessment, he did return to baseline, he had no focal neurologic deficits on arrival, remains without neurologic deficits at time of discharge.  He is counseled to call his primary care practitioner tomorrow morning to review his emergency department visit, and review his medication dosing. Return precautions were discussed with the patient, and provided in written form with the patient's discharge instructions.     Personal protective equipment including N95 surgical respirator, goggles, and gloves were used during this encounter.       Disposition:  Discharge home in stable condition    Final Impression:  1. Seizure (HCC)        Electronically signed by: Blank Kirkland MD, 7/20/2021 1:07 AM

## 2021-07-20 NOTE — ED TRIAGE NOTES
"Chief Complaint   Patient presents with   • Seizure     BIB [facility], pt has hx of seizures.  Pt states he had 2 unwitnessed seizures today.  Pt has been compliant with his Depakote but states \"I don't think it's working\".  Pt denies any complaints, GCS 15, BS 175mg/dL in route    PPE Note: I personally donned full PPE for all patient encounters during this visit, including a N95 respirator mask, gloves, and goggles.    "

## 2021-07-20 NOTE — DISCHARGE INSTRUCTIONS
Please call your primary care practitioner tomorrow morning to review your emergency department visit, and review your medications.  Return to the emergency department if you develop any new or worsening symptoms, or if you have any recurrent fevers.  Return immediately if you develop worsening headache, fevers, nausea or vomiting, or if you have any recurrent seizures.  If you cannot get in touch with your primary care practitioner tomorrow, and your symptoms do not continue to improve and completely go away by tomorrow morning, please return to the emergency department for recheck.

## 2021-07-20 NOTE — ED NOTES
Suction set up and working  Ambu-bag in room  Padding side rails  All side rails up  Call light in reach

## 2021-07-21 LAB
APPEARANCE UR: CLEAR
BILIRUB UR QL STRIP.AUTO: NEGATIVE
COLOR UR: YELLOW
GLUCOSE UR STRIP.AUTO-MCNC: >=1000 MG/DL
KETONES UR STRIP.AUTO-MCNC: ABNORMAL MG/DL
LEUKOCYTE ESTERASE UR QL STRIP.AUTO: NEGATIVE
MICRO URNS: ABNORMAL
NITRITE UR QL STRIP.AUTO: NEGATIVE
PH UR STRIP.AUTO: 5.5 [PH] (ref 5–8)
PROT UR QL STRIP: NEGATIVE MG/DL
RBC UR QL AUTO: NEGATIVE
SP GR UR STRIP.AUTO: 1.04
UROBILINOGEN UR STRIP.AUTO-MCNC: 0.2 MG/DL

## 2021-08-30 ENCOUNTER — APPOINTMENT (OUTPATIENT)
Dept: RADIOLOGY | Facility: MEDICAL CENTER | Age: 41
End: 2021-08-30
Attending: EMERGENCY MEDICINE
Payer: MEDICAID

## 2021-08-30 ENCOUNTER — HOSPITAL ENCOUNTER (OUTPATIENT)
Facility: MEDICAL CENTER | Age: 41
End: 2021-08-31
Attending: EMERGENCY MEDICINE | Admitting: INTERNAL MEDICINE
Payer: MEDICAID

## 2021-08-30 DIAGNOSIS — I63.9 ACUTE CVA (CEREBROVASCULAR ACCIDENT) (HCC): ICD-10-CM

## 2021-08-30 DIAGNOSIS — F32.A DEPRESSION, UNSPECIFIED DEPRESSION TYPE: ICD-10-CM

## 2021-08-30 DIAGNOSIS — F43.10 POSTTRAUMATIC STRESS DISORDER: ICD-10-CM

## 2021-08-30 DIAGNOSIS — F99 PSYCHIATRIC PROBLEM: ICD-10-CM

## 2021-08-30 DIAGNOSIS — F44.9 CONVERSION DISORDER: ICD-10-CM

## 2021-08-30 LAB
ABO GROUP BLD: NORMAL
ALBUMIN SERPL BCP-MCNC: 4.7 G/DL (ref 3.2–4.9)
ALBUMIN/GLOB SERPL: 1.8 G/DL
ALP SERPL-CCNC: 51 U/L (ref 30–99)
ALT SERPL-CCNC: 16 U/L (ref 2–50)
ANION GAP SERPL CALC-SCNC: 15 MMOL/L (ref 7–16)
APTT PPP: 27 SEC (ref 24.7–36)
AST SERPL-CCNC: 17 U/L (ref 12–45)
BASOPHILS # BLD AUTO: 1 % (ref 0–1.8)
BASOPHILS # BLD: 0.12 K/UL (ref 0–0.12)
BILIRUB SERPL-MCNC: 0.3 MG/DL (ref 0.1–1.5)
BLD GP AB SCN SERPL QL: NORMAL
BUN SERPL-MCNC: 19 MG/DL (ref 8–22)
CALCIUM SERPL-MCNC: 10 MG/DL (ref 8.5–10.5)
CHLORIDE SERPL-SCNC: 101 MMOL/L (ref 96–112)
CO2 SERPL-SCNC: 22 MMOL/L (ref 20–33)
CREAT SERPL-MCNC: 1.15 MG/DL (ref 0.5–1.4)
EKG IMPRESSION: NORMAL
EOSINOPHIL # BLD AUTO: 0.09 K/UL (ref 0–0.51)
EOSINOPHIL NFR BLD: 0.8 % (ref 0–6.9)
ERYTHROCYTE [DISTWIDTH] IN BLOOD BY AUTOMATED COUNT: 48.4 FL (ref 35.9–50)
GLOBULIN SER CALC-MCNC: 2.6 G/DL (ref 1.9–3.5)
GLUCOSE SERPL-MCNC: 124 MG/DL (ref 65–99)
HCT VFR BLD AUTO: 43.2 % (ref 42–52)
HGB BLD-MCNC: 14 G/DL (ref 14–18)
IMM GRANULOCYTES # BLD AUTO: 0.24 K/UL (ref 0–0.11)
IMM GRANULOCYTES NFR BLD AUTO: 2 % (ref 0–0.9)
INR PPP: 1.1 (ref 0.87–1.13)
LYMPHOCYTES # BLD AUTO: 2.62 K/UL (ref 1–4.8)
LYMPHOCYTES NFR BLD: 21.9 % (ref 22–41)
MCH RBC QN AUTO: 28.2 PG (ref 27–33)
MCHC RBC AUTO-ENTMCNC: 32.4 G/DL (ref 33.7–35.3)
MCV RBC AUTO: 87.1 FL (ref 81.4–97.8)
MONOCYTES # BLD AUTO: 1.13 K/UL (ref 0–0.85)
MONOCYTES NFR BLD AUTO: 9.4 % (ref 0–13.4)
NEUTROPHILS # BLD AUTO: 7.78 K/UL (ref 1.82–7.42)
NEUTROPHILS NFR BLD: 64.9 % (ref 44–72)
NRBC # BLD AUTO: 0 K/UL
NRBC BLD-RTO: 0 /100 WBC
PLATELET # BLD AUTO: 331 K/UL (ref 164–446)
PMV BLD AUTO: 9.7 FL (ref 9–12.9)
POTASSIUM SERPL-SCNC: 3.8 MMOL/L (ref 3.6–5.5)
PROT SERPL-MCNC: 7.3 G/DL (ref 6–8.2)
PROTHROMBIN TIME: 13.8 SEC (ref 12–14.6)
RBC # BLD AUTO: 4.96 M/UL (ref 4.7–6.1)
RH BLD: NORMAL
SODIUM SERPL-SCNC: 138 MMOL/L (ref 135–145)
TROPONIN T SERPL-MCNC: 17 NG/L (ref 6–19)
WBC # BLD AUTO: 12 K/UL (ref 4.8–10.8)

## 2021-08-30 PROCEDURE — A9270 NON-COVERED ITEM OR SERVICE: HCPCS | Performed by: INTERNAL MEDICINE

## 2021-08-30 PROCEDURE — 70498 CT ANGIOGRAPHY NECK: CPT

## 2021-08-30 PROCEDURE — 0042T CT-CEREBRAL PERFUSION ANALYSIS: CPT

## 2021-08-30 PROCEDURE — 84484 ASSAY OF TROPONIN QUANT: CPT

## 2021-08-30 PROCEDURE — 700117 HCHG RX CONTRAST REV CODE 255: Performed by: EMERGENCY MEDICINE

## 2021-08-30 PROCEDURE — 70496 CT ANGIOGRAPHY HEAD: CPT

## 2021-08-30 PROCEDURE — 700102 HCHG RX REV CODE 250 W/ 637 OVERRIDE(OP): Performed by: INTERNAL MEDICINE

## 2021-08-30 PROCEDURE — 94760 N-INVAS EAR/PLS OXIMETRY 1: CPT

## 2021-08-30 PROCEDURE — 86901 BLOOD TYPING SEROLOGIC RH(D): CPT

## 2021-08-30 PROCEDURE — 86850 RBC ANTIBODY SCREEN: CPT

## 2021-08-30 PROCEDURE — G0378 HOSPITAL OBSERVATION PER HR: HCPCS

## 2021-08-30 PROCEDURE — 71045 X-RAY EXAM CHEST 1 VIEW: CPT

## 2021-08-30 PROCEDURE — 85610 PROTHROMBIN TIME: CPT

## 2021-08-30 PROCEDURE — 99214 OFFICE O/P EST MOD 30 MIN: CPT | Performed by: STUDENT IN AN ORGANIZED HEALTH CARE EDUCATION/TRAINING PROGRAM

## 2021-08-30 PROCEDURE — 36415 COLL VENOUS BLD VENIPUNCTURE: CPT

## 2021-08-30 PROCEDURE — 85025 COMPLETE CBC W/AUTO DIFF WBC: CPT

## 2021-08-30 PROCEDURE — 99220 PR INITIAL OBSERVATION CARE,LEVL III: CPT | Performed by: INTERNAL MEDICINE

## 2021-08-30 PROCEDURE — 99285 EMERGENCY DEPT VISIT HI MDM: CPT

## 2021-08-30 PROCEDURE — 93005 ELECTROCARDIOGRAM TRACING: CPT | Performed by: EMERGENCY MEDICINE

## 2021-08-30 PROCEDURE — 70450 CT HEAD/BRAIN W/O DYE: CPT

## 2021-08-30 PROCEDURE — 85730 THROMBOPLASTIN TIME PARTIAL: CPT

## 2021-08-30 PROCEDURE — 80053 COMPREHEN METABOLIC PANEL: CPT

## 2021-08-30 PROCEDURE — 86900 BLOOD TYPING SEROLOGIC ABO: CPT

## 2021-08-30 RX ORDER — ENALAPRILAT 1.25 MG/ML
1.25 INJECTION INTRAVENOUS EVERY 6 HOURS PRN
Status: DISCONTINUED | OUTPATIENT
Start: 2021-08-30 | End: 2021-08-31 | Stop reason: HOSPADM

## 2021-08-30 RX ORDER — ATORVASTATIN CALCIUM 10 MG/1
10 TABLET, FILM COATED ORAL EVERY EVENING
Status: DISCONTINUED | OUTPATIENT
Start: 2021-08-30 | End: 2021-08-31 | Stop reason: HOSPADM

## 2021-08-30 RX ORDER — BISACODYL 10 MG
10 SUPPOSITORY, RECTAL RECTAL
Status: DISCONTINUED | OUTPATIENT
Start: 2021-08-30 | End: 2021-08-31 | Stop reason: HOSPADM

## 2021-08-30 RX ORDER — PROCHLORPERAZINE EDISYLATE 5 MG/ML
5-10 INJECTION INTRAMUSCULAR; INTRAVENOUS EVERY 4 HOURS PRN
Status: DISCONTINUED | OUTPATIENT
Start: 2021-08-30 | End: 2021-08-31 | Stop reason: HOSPADM

## 2021-08-30 RX ORDER — LEVOTHYROXINE SODIUM 0.2 MG/1
200 TABLET ORAL
Status: DISCONTINUED | OUTPATIENT
Start: 2021-08-31 | End: 2021-08-31 | Stop reason: HOSPADM

## 2021-08-30 RX ORDER — ONDANSETRON 2 MG/ML
4 INJECTION INTRAMUSCULAR; INTRAVENOUS EVERY 4 HOURS PRN
Status: DISCONTINUED | OUTPATIENT
Start: 2021-08-30 | End: 2021-08-31 | Stop reason: HOSPADM

## 2021-08-30 RX ORDER — ACETAMINOPHEN 325 MG/1
650 TABLET ORAL EVERY 6 HOURS PRN
Status: DISCONTINUED | OUTPATIENT
Start: 2021-08-30 | End: 2021-08-31 | Stop reason: HOSPADM

## 2021-08-30 RX ORDER — MONTELUKAST SODIUM 10 MG/1
10 TABLET ORAL EVERY EVENING
Status: DISCONTINUED | OUTPATIENT
Start: 2021-08-30 | End: 2021-08-31 | Stop reason: HOSPADM

## 2021-08-30 RX ORDER — AMOXICILLIN 250 MG
2 CAPSULE ORAL 2 TIMES DAILY
Status: DISCONTINUED | OUTPATIENT
Start: 2021-08-30 | End: 2021-08-31 | Stop reason: HOSPADM

## 2021-08-30 RX ORDER — LEVOTHYROXINE SODIUM 0.03 MG/1
25 TABLET ORAL
Status: DISCONTINUED | OUTPATIENT
Start: 2021-08-31 | End: 2021-08-31 | Stop reason: HOSPADM

## 2021-08-30 RX ORDER — PROMETHAZINE HYDROCHLORIDE 25 MG/1
12.5-25 TABLET ORAL EVERY 4 HOURS PRN
Status: DISCONTINUED | OUTPATIENT
Start: 2021-08-30 | End: 2021-08-31 | Stop reason: HOSPADM

## 2021-08-30 RX ORDER — CLONIDINE HYDROCHLORIDE 0.1 MG/1
0.1 TABLET ORAL EVERY 6 HOURS PRN
Status: DISCONTINUED | OUTPATIENT
Start: 2021-08-30 | End: 2021-08-31 | Stop reason: HOSPADM

## 2021-08-30 RX ORDER — ONDANSETRON 4 MG/1
4 TABLET, ORALLY DISINTEGRATING ORAL EVERY 4 HOURS PRN
Status: DISCONTINUED | OUTPATIENT
Start: 2021-08-30 | End: 2021-08-31 | Stop reason: HOSPADM

## 2021-08-30 RX ORDER — FENOFIBRATE 134 MG/1
134 CAPSULE ORAL DAILY
Status: DISCONTINUED | OUTPATIENT
Start: 2021-08-31 | End: 2021-08-31 | Stop reason: HOSPADM

## 2021-08-30 RX ORDER — PROMETHAZINE HYDROCHLORIDE 25 MG/1
12.5-25 SUPPOSITORY RECTAL EVERY 4 HOURS PRN
Status: DISCONTINUED | OUTPATIENT
Start: 2021-08-30 | End: 2021-08-31 | Stop reason: HOSPADM

## 2021-08-30 RX ORDER — BUDESONIDE AND FORMOTEROL FUMARATE DIHYDRATE 160; 4.5 UG/1; UG/1
2 AEROSOL RESPIRATORY (INHALATION) 2 TIMES DAILY
Status: DISCONTINUED | OUTPATIENT
Start: 2021-08-30 | End: 2021-08-31 | Stop reason: HOSPADM

## 2021-08-30 RX ORDER — DIVALPROEX SODIUM 500 MG/1
500-1500 TABLET, DELAYED RELEASE ORAL 2 TIMES DAILY
Status: DISCONTINUED | OUTPATIENT
Start: 2021-08-30 | End: 2021-08-30

## 2021-08-30 RX ORDER — DIVALPROEX SODIUM 500 MG/1
500 TABLET, DELAYED RELEASE ORAL EVERY MORNING
Status: DISCONTINUED | OUTPATIENT
Start: 2021-08-31 | End: 2021-08-31

## 2021-08-30 RX ORDER — POLYETHYLENE GLYCOL 3350 17 G/17G
1 POWDER, FOR SOLUTION ORAL
Status: DISCONTINUED | OUTPATIENT
Start: 2021-08-30 | End: 2021-08-31 | Stop reason: HOSPADM

## 2021-08-30 RX ORDER — DIVALPROEX SODIUM 500 MG/1
1500 TABLET, DELAYED RELEASE ORAL NIGHTLY
Status: DISCONTINUED | OUTPATIENT
Start: 2021-08-30 | End: 2021-08-31

## 2021-08-30 RX ORDER — LABETALOL HYDROCHLORIDE 5 MG/ML
10 INJECTION, SOLUTION INTRAVENOUS EVERY 4 HOURS PRN
Status: DISCONTINUED | OUTPATIENT
Start: 2021-08-30 | End: 2021-08-31 | Stop reason: HOSPADM

## 2021-08-30 RX ORDER — PRAZOSIN HYDROCHLORIDE 2 MG/1
4 CAPSULE ORAL EVERY EVENING
Status: DISCONTINUED | OUTPATIENT
Start: 2021-08-30 | End: 2021-08-31 | Stop reason: HOSPADM

## 2021-08-30 RX ORDER — ALBUTEROL SULFATE 90 UG/1
2 AEROSOL, METERED RESPIRATORY (INHALATION) EVERY 6 HOURS PRN
Status: DISCONTINUED | OUTPATIENT
Start: 2021-08-30 | End: 2021-08-31 | Stop reason: HOSPADM

## 2021-08-30 RX ORDER — LISINOPRIL 10 MG/1
10 TABLET ORAL DAILY
Status: DISCONTINUED | OUTPATIENT
Start: 2021-08-31 | End: 2021-08-31 | Stop reason: HOSPADM

## 2021-08-30 RX ORDER — VITAMIN B COMPLEX
4000 TABLET ORAL EVERY EVENING
Status: DISCONTINUED | OUTPATIENT
Start: 2021-08-30 | End: 2021-08-31 | Stop reason: HOSPADM

## 2021-08-30 RX ADMIN — IOHEXOL 120 ML: 350 INJECTION, SOLUTION INTRAVENOUS at 16:21

## 2021-08-30 ASSESSMENT — LIFESTYLE VARIABLES
CONSUMPTION TOTAL: NEGATIVE
HOW MANY TIMES IN THE PAST YEAR HAVE YOU HAD 5 OR MORE DRINKS IN A DAY: 0
TOTAL SCORE: 0
AVERAGE NUMBER OF DAYS PER WEEK YOU HAVE A DRINK CONTAINING ALCOHOL: 0
ON A TYPICAL DAY WHEN YOU DRINK ALCOHOL HOW MANY DRINKS DO YOU HAVE: 0
DO YOU DRINK ALCOHOL: NO
HAVE PEOPLE ANNOYED YOU BY CRITICIZING YOUR DRINKING: NO
EVER FELT BAD OR GUILTY ABOUT YOUR DRINKING: NO
TOTAL SCORE: 0
DOES PATIENT WANT TO STOP DRINKING: NO
TOTAL SCORE: 0
HAVE YOU EVER FELT YOU SHOULD CUT DOWN ON YOUR DRINKING: NO
ALCOHOL_USE: NO
SUBSTANCE_ABUSE: 0
EVER HAD A DRINK FIRST THING IN THE MORNING TO STEADY YOUR NERVES TO GET RID OF A HANGOVER: NO

## 2021-08-30 ASSESSMENT — ENCOUNTER SYMPTOMS
COUGH: 0
VOMITING: 0
FEVER: 0
CHILLS: 0
SPEECH CHANGE: 1
HALLUCINATIONS: 0
FALLS: 0
MYALGIAS: 0
SORE THROAT: 0
WEAKNESS: 1
DEPRESSION: 0
NAUSEA: 0
SHORTNESS OF BREATH: 0
PALPITATIONS: 0
ABDOMINAL PAIN: 0

## 2021-08-30 ASSESSMENT — FIBROSIS 4 INDEX
FIB4 SCORE: 0.75
FIB4 SCORE: 0.5

## 2021-08-30 ASSESSMENT — PAIN DESCRIPTION - PAIN TYPE: TYPE: ACUTE PAIN

## 2021-08-30 NOTE — ASSESSMENT & PLAN NOTE
Nonphysiologic features to exam and history. Suspect conversion disorder.  - Admit to hospitalist for PT/OT.  - Consider psychiatry consultation if otherwise clinically appropriate. Patient denies psychiatric concerns to me.  - Check Depakote levels to confirm medication adherence given possibility of post-ictal phenomenon.  - Consider PRN tylenol for headache, which may contribute to weakness or anxiety.  - Dispo as appropriate based on resolution of weakness over time.  - Call neurology team with any further questions or concerns.

## 2021-08-30 NOTE — ED PROVIDER NOTES
ED Provider  Scribed for Augustus Carroll D.O. by Sherwin Garcia. 2021  4:01 PM    Means of arrival:EMS  History obtained from:EMS   History limited by: None    CHIEF COMPLAINT  Chief Complaint   Patient presents with    Possible Stroke     Pt reports he started developing numbness/tingling while walking today. Presented to ER with R side paralysis and aphasia.      HPI  Norm Prabhakar is a 41 y.o. male who presents to the ED via EMS as a code stroke complaining of severe right sided weakness onset about two hours ago. Per EMS, the patient was walking when he suddenly started to feel weak and sat down. EMS notes associated symptoms of right sided numbness and stuttering speech. No alleviating factors reported. EMS also notes that he has a history of seizures and hypertension. He denies any seizure activity today. He is not taking any blood thinners. He is currently staying at a [facility] home for psychiatric problems.     REVIEW OF SYSTEMS  See HPI for further details. All other systems are negative.     PAST MEDICAL HISTORY   has a past medical history of Anxiety, ASTHMA, Bipolar 1 disorder ([facility]), Depression, Diabetes ([facility]), Fall, Glaucoma, Glaucoma (), Hypothyroidism, Indigestion, Mental disorder, Murmur, Pneumonia, Psychiatric problem (), S/P thyroidectomy, Seizure ([facility]) (), Seizure disorder ([facility]), and Unspecified disorder of thyroid.    SOCIAL HISTORY  Social History     Tobacco Use    Smoking status: Former Smoker     Packs/day: 0.25     Types: Cigarettes, Cigars     Quit date: 2020     Years since quittin.4    Smokeless tobacco: Never Used    Tobacco comment: 3 cigarettes a day   Vaping Use    Vaping Use: Former   Substance and Sexual Activity    Alcohol use: Not Currently    Drug use: Yes     Types: Inhaled     Comment: marijuana     SURGICAL HISTORY   has a past surgical history that includes eye surgery; thyroid lobectomy; and other.    CURRENT MEDICATIONS  Current Outpatient  "Medications   Medication Instructions    albuterol 108 (90 Base) MCG/ACT Aero Soln inhalation aerosol 2 Puffs, Inhalation, EVERY 6 HOURS PRN    atorvastatin (LIPITOR) 10 mg, Oral, EVERY EVENING    budesonide-formoterol (SYMBICORT) 160-4.5 MCG/ACT Aerosol 2 Puffs, Inhalation, 2 TIMES DAILY    divalproex (DEPAKOTE) 500-1,500 mg, Oral, 2 TIMES DAILY, 1 tablet = 500 mg every morning<BR>3 tablets = 1500 mg every evening    docusate sodium (COLACE) 100 mg, Oral, DAILY    Fenofibrate 134 mg, Oral, DAILY    fish oil 1,000 mg, Oral, 2 TIMES DAILY    insulin glargine 30 Units, Subcutaneous, 2 TIMES DAILY    Jardiance 25 mg, Oral, DAILY    levothyroxine (SYNTHROID) 200 mcg, Oral, EACH MORNING ON EMPTY STOMACH, Take in addition to 200 mcg tablet for daily dose of 225 mcg    levothyroxine (SYNTHROID) 200 mcg, Oral, EACH MORNING ON EMPTY STOMACH, Take in addition to 25 mcg tablet for daily dose of 225 mcg    lisinopril (PRINIVIL) 10 mg, Oral, DAILY    lurasidone (LATUDA) 20 mg, Oral, EVERY BEDTIME    metformin (GLUCOPHAGE) 1,000 mg, Oral, 2 TIMES DAILY WITH MEALS    montelukast (SINGULAIR) 10 mg, Oral, EVERY BEDTIME    ondansetron (ZOFRAN ODT) 4 mg, Oral, EVERY 6 HOURS PRN    prazosin (MINIPRESS) 4 mg, Oral, EVERY BEDTIME, 2 caps = 4 mg    sertraline (ZOLOFT) 100 mg, Oral, DAILY    SITagliptin (JANUVIA) 100 mg, Oral, DAILY    Vitamin D3 4,000 Units, Oral, EVERY BEDTIME, 2 caps = 4,000 units         ALLERGIES  Allergies   Allergen Reactions    Geodon [Ziprasidone Hcl] Anaphylaxis     Anaphylaxis per patient    Abilify      \"Feeling tired, like I don't even know whats going on around me\"    Fish      Pt reports fish causes him to be sick to his stomach  Not listed on MAR noted 2/3/2021         PHYSICAL EXAM  VITAL SIGNS: /75   Pulse 87   Ht 1.981 m (6' 6\")   Wt (!) 136 kg (299 lb 13.2 oz)   SpO2 94%   BMI 34.65 kg/m²   Constitutional: Alert in no apparent distress.  HENT: No signs of trauma, mucous membranes are " moist  Eyes: States a history of left eye blindness, right eye has no peripheral deficit, EOMI,  Conjunctiva normal, Non-icteric.   Neck: Normal range of motion, No tenderness, Supple.  Lymphatic: No lymphadenopathy noted.   Cardiovascular: Regular rate and rhythm, no murmurs.   Thorax & Lungs: Normal breath sounds, No respiratory distress, No wheezing, No chest tenderness.   Abdomen: Bowel sounds normal, Soft, No tenderness, No masses, No pulsatile masses. No peritoneal signs.  Skin: Warm, Dry, normal color.   Back: No bony tenderness, No CVA tenderness.   Extremities: , No edema, No tenderness, No cyanosis  Musculoskeletal: Good range of motion in all major joints. No tenderness to palpation or major deformities noted.   Neurologic: NIH 22. Alert and awake, believes it's September 2020. He has speech dysarthria/stuttering, speech is slow, no aphasia. No movement in right upper and lower extremities, Sensation absent to the right upper and right lower extremities.   Psychiatric: Affect normal, Judgment normal, Mood normal.     DIAGNOSTIC STUDIES / PROCEDURES    EKG  12 Lead EKG interpreted by me shown below.    LABS  Results for orders placed or performed during the hospital encounter of 08/30/21   CBC WITH DIFFERENTIAL   Result Value Ref Range    WBC 12.0 (H) 4.8 - 10.8 K/uL    RBC 4.96 4.70 - 6.10 M/uL    Hemoglobin 14.0 14.0 - 18.0 g/dL    Hematocrit 43.2 42.0 - 52.0 %    MCV 87.1 81.4 - 97.8 fL    MCH 28.2 27.0 - 33.0 pg    MCHC 32.4 (L) 33.7 - 35.3 g/dL    RDW 48.4 35.9 - 50.0 fL    Platelet Count 331 164 - 446 K/uL    MPV 9.7 9.0 - 12.9 fL    Neutrophils-Polys 64.90 44.00 - 72.00 %    Lymphocytes 21.90 (L) 22.00 - 41.00 %    Monocytes 9.40 0.00 - 13.40 %    Eosinophils 0.80 0.00 - 6.90 %    Basophils 1.00 0.00 - 1.80 %    Immature Granulocytes 2.00 (H) 0.00 - 0.90 %    Nucleated RBC 0.00 /100 WBC    Neutrophils (Absolute) 7.78 (H) 1.82 - 7.42 K/uL    Lymphs (Absolute) 2.62 1.00 - 4.80 K/uL    Monos (Absolute)  1.13 (H) 0.00 - 0.85 K/uL    Eos (Absolute) 0.09 0.00 - 0.51 K/uL    Baso (Absolute) 0.12 0.00 - 0.12 K/uL    Immature Granulocytes (abs) 0.24 (H) 0.00 - 0.11 K/uL    NRBC (Absolute) 0.00 K/uL   COMP METABOLIC PANEL   Result Value Ref Range    Sodium 138 135 - 145 mmol/L    Potassium 3.8 3.6 - 5.5 mmol/L    Chloride 101 96 - 112 mmol/L    Co2 22 20 - 33 mmol/L    Anion Gap 15.0 7.0 - 16.0    Glucose 124 (H) 65 - 99 mg/dL    Bun 19 8 - 22 mg/dL    Creatinine 1.15 0.50 - 1.40 mg/dL    Calcium 10.0 8.5 - 10.5 mg/dL    AST(SGOT) 17 12 - 45 U/L    ALT(SGPT) 16 2 - 50 U/L    Alkaline Phosphatase 51 30 - 99 U/L    Total Bilirubin 0.3 0.1 - 1.5 mg/dL    Albumin 4.7 3.2 - 4.9 g/dL    Total Protein 7.3 6.0 - 8.2 g/dL    Globulin 2.6 1.9 - 3.5 g/dL    A-G Ratio 1.8 g/dL   PROTHROMBIN TIME   Result Value Ref Range    PT 13.8 12.0 - 14.6 sec    INR 1.10 0.87 - 1.13   APTT   Result Value Ref Range    APTT 27.0 24.7 - 36.0 sec   COD (ADULT)   Result Value Ref Range    ABO Grouping Only A     Rh Grouping Only POS     Antibody Screen-Cod NEG    TROPONIN   Result Value Ref Range    Troponin T 17 6 - 19 ng/L   ESTIMATED GFR   Result Value Ref Range    GFR If African American >60 >60 mL/min/1.73 m 2    GFR If Non African American >60 >60 mL/min/1.73 m 2   EKG (NOW)   Result Value Ref Range    Report       [facility] Emergency Dept.    Test Date:  2021  Pt Name:    HOLLY KIM                 Department: ER  MRN:        [id number]                      Room:  Gender:     Male                         Technician: [id number]  :        1980                   Requested By:VIKY FONSECA  Order #:    [id number]                    Philippe MD: VIKY FONSECA DLyricOLyric    Measurements  Intervals                                Axis  Rate:       81                           P:          45  UT:         161                          QRS:        -7  QRSD:       113                          T:          18  QT:          361  QTc:        420    Interpretive Statements  Sinus rhythm  Borderline intraventricular conduction delay  Abnormal R-wave progression, late transition  Compared to ECG 07/01/2021 20:54:44  No significant changes  Electronically Signed On 8- 17:04:36 PDT by VIKY FONSECA D.O.         All labs reviewed by me.    RADIOLOGY  DX-CHEST-PORTABLE (1 VIEW)   Final Result      No acute cardiopulmonary disease evident.      CT-CTA NECK WITH & W/O-POST PROCESSING   Final Result      1.  No carotid or vertebral artery stenosis or occlusion.      2.  Stable appearance of 2 enhancing masses in the paratracheal region of the neck of uncertain etiology. Possible ectopic thyroid tissue.      CT-CTA HEAD WITH & W/O-POST PROCESS   Final Result      CT angiogram of the [place] within normal limits.      CT-CEREBRAL PERFUSION ANALYSIS   Final Result      1.  Cerebral blood flow less than 30% likely representing completed infarct = 0 mL.      2.  T Max more than 6 seconds likely representing combination of completed infarct and ischemia = 14 mL.      3.  Mismatched volume likely representing ischemic brain/penumbra = Infinite      4.  Please note that the cerebral perfusion was performed on the limited brain tissue around the basal ganglia region. Infarct/ischemia outside the CT perfusion sections can be missed in this study.      CT-HEAD W/O   Final Result      1.  No definite acute intracranial abnormality.   2.  Redemonstrated diffuse confluent periventricular and subcortical hypoattenuation which may represent severe chronic white matter leukoencephalopathy.        The radiologist's interpretations of all radiological studies have been reviewed by me.    Films have been independently by me      COURSE  Pertinent Labs & Imaging studies reviewed. (See chart for details)    4:01 PM - Patient seen and examined at bedside. Discussed plan of care. Ordered for DX-Chest, CT-Head w/o, CT-Cerebral perfusion analysis,  CT-CTA head with and w/o post processing, CT-CTA neck with & w/o post processing, EKG, CBC with differential, CMP, Prothrombin, APTT, COD, and Troponin to evaluate his symptoms.     5:08 PM - Paged Hospitalist.     5:10 PM- Patient was reevaluated at bedside. Discussed results with the patient and informed them of my plan for admission. He remains with significant weakness on right side. His CT and CTA are negative. I discussed these results with Dr. Harris (Neurology). Previous admission and studies for similar symptoms have shown no etiology. He does not meet criteria for IV alteplase or interventional radiology.    5:21 PM - I discussed the patient's case and the above findings with Dr. Mcfadden (Hospitalist) who agrees to assess the patient for hospitalization.     MEDICAL DECISION MAKING  This is a 41 y.o. male who presents with a right-sided weakness.  He has stuttering type of speech dysarthria.  He is flaccid in the right arm but he has no in the tension on that side he does have good tone he does stop his arm from falling.    He was [place] [facility] at the triage desk as a stroke protocol.  And reevaluation was done after CT scan results.    CT CTA shows no obvious occlusion no sign of stroke.  His records reviewed shows he has similar symptoms in the past and work-up has been negative.  This may be stroke, TIA, Santosh's paralysis or conversion disorder.  I spoke with the neurologist concerning this patient spoke with the hospitalist for admission.      Critical care time 30 minutes    DISPOSITION:  Patient will be hospitalized by Dr. Mcfadden in guarded condition.    FINAL IMPRESSION  1. Acute CVA (cerebrovascular accident) (HCC)         Sherwin ROB (Stephanie), am scribing for, and in the presence of, Augustus Carroll D.O..    Electronically signed by: Sherwin Hawley), 8/30/2021    Augustus ROB D.O. personally performed the services described in this documentation, as  scribed by Sherwin Garcia in my presence, and it is both accurate and complete. C.    The note accurately reflects work and decisions made by me.  Augustus Carroll D.O.  8/30/2021  9:17 PM

## 2021-08-30 NOTE — CONSULTS
"Neurology STROKE CODE H&P  Neurohospitalist Service, [facility]    Referring Physician: Augustus Carroll D.O.    STROKE CODE:   Chief Complaint   Patient presents with   • Possible Stroke     Pt reports he started developing numbness/tingling while walking today. Presented to ER with R side paralysis and aphasia.        To obtain the most accurate data regarding the time called, and time patient seen, refer to the stroke run-sheet and chart.  For time of CT, refer to the radiology report. See A&P below for TPA Decision and door to needle time if and when applicable.    HPI: Norm Prabhakar is a 41 y.o. male presenting for whom neurology has been consulted for right side weakness and stuttering. Much of the history is obtained from EMS report, as Norm struggles to provide many details. He states that he was walking today at 1400 when he started to feel numb and tingly on his right side. He had to sit down and call for help at that time. He states that he did not lose consciousness around this interval. A passerby called for 911, and EMS arrived within 30 minutes due to getting the incorrect address.     He states that he has had symptoms like this in the past, and chart review reveals that he was treated at [facility] for the same symptoms in March 2021. This was called \"recurrent right sided weakness\" at that time, so it seems to have happened multiple times. He had a negative stroke workup including MRI at that time and was discharged back to his baseline in a number of days.    He also has a history of epilepsy, mood disorder, psychiatric disorders, and stays in behavioral/substance assistance facilities. He sees Dr. Franco as his outpatient neurologist and takes Depakote 500mg TID, but there is no specification or testing found for his epilepsy. He is unable to provide reliable seizure history but states that he did not lose consciousness today.    He reports a mild, dull headache for the past " "several days. Denies photophobia, phonophobia, nausea, vomiting.      Review of systems: In addition to what is detailed in the HPI above, all other systems reviewed and are negative.    Past Medical History:    has a past medical history of Anxiety, ASTHMA, Bipolar 1 disorder ([facility]), Depression, Diabetes ([facility]), Fall, Glaucoma, Glaucoma (1982), Hypothyroidism, Indigestion, Mental disorder, Murmur, Pneumonia, Psychiatric problem (2002), S/P thyroidectomy, Seizure (HCC) (2010), Seizure disorder ([facility]), and Unspecified disorder of thyroid.    FHx:  family history includes Heart Disease in his mother; Hypertension in his mother; Lung Disease in his mother; Stroke in his maternal grandmother.    SHx:   reports that he quit smoking about 16 months ago. His smoking use included cigarettes and cigars. He smoked 0.25 packs per day. He has never used smokeless tobacco. He reports previous alcohol use. He reports current drug use. Drug: Inhaled.    Allergies:  Allergies   Allergen Reactions   • Geodon [Ziprasidone Hcl] Anaphylaxis     Anaphylaxis per patient   • Abilify      \"Feeling tired, like I don't even know whats going on around me\"   • Fish      Pt reports fish causes him to be sick to his stomach  Not listed on MAR noted 2/3/2021         Medications:  No current facility-administered medications for this encounter.    Current Outpatient Medications:   •  metformin (GLUCOPHAGE) 1000 MG tablet, Take 1,000 mg by mouth 2 times a day with meals., Disp: , Rfl:   •  docusate sodium (COLACE) 100 MG Cap, Take 100 mg by mouth every day., Disp: , Rfl:   •  levothyroxine (SYNTHROID) 200 MCG Tab, Take 200 mcg by mouth every morning on an empty stomach. Take in addition to 25 mcg tablet for daily dose of 225 mcg, Disp: , Rfl:   •  divalproex (DEPAKOTE) 500 MG Tablet Delayed Response, Take 500-1,500 mg by mouth 2 times a day. 1 tablet = 500 mg every morning 3 tablets = 1500 mg every evening, Disp: , Rfl:   •  SITagliptin (JANUVIA) 100 " MG Tab, Take 100 mg by mouth every day., Disp: , Rfl:   •  Fenofibrate 134 MG Cap, Take 134 mg by mouth every day., Disp: , Rfl:   •  albuterol 108 (90 Base) MCG/ACT Aero Soln inhalation aerosol, Inhale 2 Puffs every 6 hours as needed for Shortness of Breath., Disp: , Rfl:   •  sertraline (ZOLOFT) 100 MG Tab, Take 100 mg by mouth every day., Disp: , Rfl:   •  levothyroxine (SYNTHROID) 25 MCG Tab, Take 200 mcg by mouth every morning on an empty stomach. Take in addition to 200 mcg tablet for daily dose of 225 mcg, Disp: , Rfl:   •  Empagliflozin (JARDIANCE) 25 MG Tab, Take 25 mg by mouth every day., Disp: , Rfl:   •  budesonide-formoterol (SYMBICORT) 160-4.5 MCG/ACT Aerosol, Inhale 2 Puffs 2 Times a Day., Disp: , Rfl:   •  lurasidone (LATUDA) 20 MG Tab, Take 20 mg by mouth every bedtime., Disp: , Rfl:   •  atorvastatin (LIPITOR) 10 MG Tab, Take 10 mg by mouth every evening., Disp: , Rfl:   •  lisinopril (PRINIVIL) 10 MG Tab, Take 10 mg by mouth every day., Disp: , Rfl:   •  Cholecalciferol (VITAMIN D3) 2000 UNIT Cap, Take 4,000 Units by mouth at bedtime. 2 caps = 4,000 units, Disp: , Rfl:   •  prazosin (MINIPRESS) 2 MG Cap, Take 4 mg by mouth at bedtime. 2 caps = 4 mg, Disp: , Rfl:   •  montelukast (SINGULAIR) 10 MG Tab, Take 10 mg by mouth every bedtime., Disp: , Rfl:   •  Omega-3 Fatty Acids (FISH OIL) 1000 MG Cap capsule, Take 1,000 mg by mouth 2 Times a Day., Disp: , Rfl:   •  insulin glargine (BASAGLAR KWIKPEN) 100 UNIT/ML injection PEN, Inject 30 Units under the skin 2 times a day., Disp: , Rfl:   •  ondansetron (ZOFRAN ODT) 4 MG TABLET DISPERSIBLE, Take 1 Tab by mouth every 6 hours as needed. (Patient taking differently: Take 4 mg by mouth every 6 hours as needed. 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 Examination of optic disks not indicated at this time given acuity of consult  Neck: There is normal range of motion  CV: Regular rate and rhythm.  Extremities:  Clear, dry, intact, without peripheral edema    NEUROLOGICAL EXAM:     Mental status: Awake, alert and fully oriented  Speech and language: Speech is dysarthric, but fluent. The patient is able to name and repeat, and follow commands  Cranial nerve exam: Right Pupil is round and reactive to light. Left pupil unresponsive at baseline. Visual fields full in R eye. Left eye blindness. There is no nystagmus. Extraocular muscles are intact. Face is symmetric. Sensation in the face is intact to light touch. Palate elevates symmetrically. Tongue is midline.  Motor exam: RUE and RLE 0/5 with no effort but normal tone and passive ROM.  Sensory exam:  There is no response to noxious stimulation in the RUE or RLE.   Deep tendon reflexes:  1+ throughout. Toes down-going bilaterally.  Coordination: No ataxia on bilateral finger-to-nose testing.  Gait: Deferred due to patient preference.    NIHSS: National Institutes of Health Stroke Scale    [0] 1a:Level of Consciousness    0-alert 1-drowsy   2-stupor   3-coma  [0] 1b:LOC Questions                  0-both  1-one      2-neither  [0] 1c:LOC Commands                   0-both  1-one      2-neither  [0] 2: Best Gaze                     0-nl    1-partial  2-forced  [0] 3: Visual Fields                   0-nl    1-partial  2-complete 3-bilat  [0] 4: Facial Paresis                0-nl    1-minor    2-partial  3-full  MOTOR                       0-nl  [4] 5: Right Arm           1-drift  [0] 6: Left Arm             2-some effort vs gravity  [4] 7: Right Leg           3-no effort vs gravity  [0] 8: Left Leg             4-no movement                          "    x-untestable  [0] 9: Limb Ataxia                    0-abs   1-1_limb   2-2+_limbs       x-untestable  [2] 10:Sensory                        0-nl    1-partial  2-dense  [0] 11:Best Language/Aphasia         0-nl    1-mild/mod 2-severe   3-mute  [1] 12:Dysarthria                     0-nl    1-mild/mod 2-severe       x-untestable  [0] 13:Neglect/Inattention            0-none  1-partial  2-complete  [11] TOTAL    Baseline Modified Louisa Scale (MRS): 0    Objective Data:    Labs:  Lab Results   Component Value Date/Time    PROTHROMBTM 13.8 08/30/2021 03:59 PM    INR 1.10 08/30/2021 03:59 PM      Lab Results   Component Value Date/Time    WBC 12.0 (H) 08/30/2021 03:59 PM    RBC 4.96 08/30/2021 03:59 PM    HEMOGLOBIN 14.0 08/30/2021 03:59 PM    HEMATOCRIT 43.2 08/30/2021 03:59 PM    MCV 87.1 08/30/2021 03:59 PM    MCH 28.2 08/30/2021 03:59 PM    MCHC 32.4 (L) 08/30/2021 03:59 PM    MPV 9.7 08/30/2021 03:59 PM    NEUTSPOLYS 64.90 08/30/2021 03:59 PM    LYMPHOCYTES 21.90 (L) 08/30/2021 03:59 PM    MONOCYTES 9.40 08/30/2021 03:59 PM    EOSINOPHILS 0.80 08/30/2021 03:59 PM    BASOPHILS 1.00 08/30/2021 03:59 PM    ANISOCYTOSIS 2+ (A) 07/01/2021 07:44 PM      Lab Results   Component Value Date/Time    SODIUM 136 07/19/2021 09:45 PM    POTASSIUM 3.9 07/19/2021 09:45 PM    CHLORIDE 98 07/19/2021 09:45 PM    CO2 21 07/19/2021 09:45 PM    GLUCOSE 167 (H) 07/19/2021 09:45 PM    BUN 24 (H) 07/19/2021 09:45 PM    CREATININE 1.26 07/19/2021 09:45 PM      Lab Results   Component Value Date/Time    CHOLSTRLTOT 199 03/10/2021 03:48 AM    LDL see below 03/10/2021 03:48 AM    HDL 29 (A) 03/10/2021 03:48 AM    TRIGLYCERIDE 636 (H) 03/10/2021 03:48 AM       Lab Results   Component Value Date/Time    ALKPHOSPHAT 45 07/19/2021 09:45 PM    ASTSGOT 16 07/19/2021 09:45 PM    ALTSGPT 8 07/19/2021 09:45 PM    TBILIRUBIN 0.2 07/19/2021 09:45 PM        Imaging/Testing:    I interpreted and/or reviewed the patient's neuroimaging    CT-CTA NECK WITH &  W/O-POST PROCESSING   Final Result      1.  No carotid or vertebral artery stenosis or occlusion.      2.  Stable appearance of 2 enhancing masses in the paratracheal region of the neck of uncertain etiology. Possible ectopic thyroid tissue.      CT-CTA HEAD WITH & W/O-POST PROCESS   Final Result      CT angiogram of the [place] within normal limits.      CT-CEREBRAL PERFUSION ANALYSIS   Final Result      1.  Cerebral blood flow less than 30% likely representing completed infarct = 0 mL.      2.  T Max more than 6 seconds likely representing combination of completed infarct and ischemia = 14 mL.      3.  Mismatched volume likely representing ischemic brain/penumbra = Infinite      4.  Please note that the cerebral perfusion was performed on the limited brain tissue around the basal ganglia region. Infarct/ischemia outside the CT perfusion sections can be missed in this study.      CT-HEAD W/O   Final Result      1.  No definite acute intracranial abnormality.   2.  Redemonstrated diffuse confluent periventricular and subcortical hypoattenuation which may represent severe chronic white matter leukoencephalopathy.      DX-CHEST-PORTABLE (1 VIEW)    (Results Pending)       Assessment and Plan:    Norm Prabhakar is a 41 y.o. male  presenting for whom neurology has been consulted for new onset stuttering and right sided hemiplegia that began acutely at 2PM. These symptoms have been demonstrated multiple times in the past with normal neurological workups including MRI. There is suspected past history of conversion disorder. Differential could also include seizure with post-ictal paralysis, but he denies any LOC, hemiplegic migraine but denies migrainous headache.    Suspicion for acute stroke is low. CT imaging is reviewed and normal.       Problem list:  1. Right hemiparesis and stuttering  Nonphysiologic features to exam and history. Suspect conversion disorder.        Recommendations:  - Admit to hospitalist  for PT/OT.  - Consider psychiatry consultation if otherwise clinically appropriate. Patient denies psychiatric concerns to me.  - Check Depakote levels to confirm medication adherence given possibility of post-ictal phenomenon.  - Consider PRN tylenol for headache, which may contribute to weakness or anxiety.  - Dispo as appropriate based on resolution of weakness over time.  - Suspect repeat MRI will be unlikely to yield further information and do not recommend at this time.  - Call neurology team with any further questions or concerns.          Woo Harris D.O.  Neurohospitalist, [facility]

## 2021-08-30 NOTE — ED TRIAGE NOTES
"Chief Complaint   Patient presents with   • Possible Stroke     Pt reports he started developing numbness/tingling while walking today. Presented to ER with R side paralysis and aphasia.      /75   Pulse 87   Temp 36.7 °C (98 °F) (Temporal)   Resp 16   Ht 1.981 m (6' 6\")   Wt (!) 136 kg (299 lb 13.2 oz)   SpO2 94%   BMI 34.65 kg/m²     Pt brought in by [facility]. Stroke called. Pt seen by ERP and neurology. Pt taken to CT scan.    PIV established,  pta  "

## 2021-08-31 ENCOUNTER — PATIENT OUTREACH (OUTPATIENT)
Dept: HEALTH INFORMATION MANAGEMENT | Facility: OTHER | Age: 41
End: 2021-08-31

## 2021-08-31 VITALS
HEIGHT: 78 IN | DIASTOLIC BLOOD PRESSURE: 69 MMHG | SYSTOLIC BLOOD PRESSURE: 113 MMHG | BODY MASS INDEX: 32.34 KG/M2 | RESPIRATION RATE: 18 BRPM | HEART RATE: 49 BPM | OXYGEN SATURATION: 97 % | WEIGHT: 279.54 LBS | TEMPERATURE: 97.3 F

## 2021-08-31 LAB — VALPROATE SERPL-MCNC: 51.3 UG/ML (ref 50–100)

## 2021-08-31 PROCEDURE — 700111 HCHG RX REV CODE 636 W/ 250 OVERRIDE (IP): Performed by: INTERNAL MEDICINE

## 2021-08-31 PROCEDURE — 96372 THER/PROPH/DIAG INJ SC/IM: CPT

## 2021-08-31 PROCEDURE — 80164 ASSAY DIPROPYLACETIC ACD TOT: CPT

## 2021-08-31 PROCEDURE — 96366 THER/PROPH/DIAG IV INF ADDON: CPT

## 2021-08-31 PROCEDURE — 700102 HCHG RX REV CODE 250 W/ 637 OVERRIDE(OP): Performed by: INTERNAL MEDICINE

## 2021-08-31 PROCEDURE — 700111 HCHG RX REV CODE 636 W/ 250 OVERRIDE (IP): Performed by: STUDENT IN AN ORGANIZED HEALTH CARE EDUCATION/TRAINING PROGRAM

## 2021-08-31 PROCEDURE — A9270 NON-COVERED ITEM OR SERVICE: HCPCS | Performed by: INTERNAL MEDICINE

## 2021-08-31 PROCEDURE — 700105 HCHG RX REV CODE 258: Performed by: STUDENT IN AN ORGANIZED HEALTH CARE EDUCATION/TRAINING PROGRAM

## 2021-08-31 PROCEDURE — 99217 PR OBSERVATION CARE DISCHARGE: CPT | Performed by: STUDENT IN AN ORGANIZED HEALTH CARE EDUCATION/TRAINING PROGRAM

## 2021-08-31 PROCEDURE — 97161 PT EVAL LOW COMPLEX 20 MIN: CPT

## 2021-08-31 PROCEDURE — 92610 EVALUATE SWALLOWING FUNCTION: CPT

## 2021-08-31 PROCEDURE — 97166 OT EVAL MOD COMPLEX 45 MIN: CPT

## 2021-08-31 PROCEDURE — 96365 THER/PROPH/DIAG IV INF INIT: CPT

## 2021-08-31 PROCEDURE — 96375 TX/PRO/DX INJ NEW DRUG ADDON: CPT

## 2021-08-31 PROCEDURE — G0378 HOSPITAL OBSERVATION PER HR: HCPCS

## 2021-08-31 RX ORDER — VALPROATE SODIUM 100 MG/ML
1500 INJECTION INTRAVENOUS EVERY MORNING
Status: DISCONTINUED | OUTPATIENT
Start: 2021-08-31 | End: 2021-08-31

## 2021-08-31 RX ORDER — VALPROATE SODIUM 100 MG/ML
500 INJECTION INTRAVENOUS EVERY MORNING
Status: DISCONTINUED | OUTPATIENT
Start: 2021-08-31 | End: 2021-08-31

## 2021-08-31 RX ORDER — KETOROLAC TROMETHAMINE 30 MG/ML
30 INJECTION, SOLUTION INTRAMUSCULAR; INTRAVENOUS ONCE
Status: COMPLETED | OUTPATIENT
Start: 2021-08-31 | End: 2021-08-31

## 2021-08-31 RX ADMIN — DEXTROSE MONOHYDRATE 500 MG: 50 INJECTION, SOLUTION INTRAVENOUS at 01:11

## 2021-08-31 RX ADMIN — ENOXAPARIN SODIUM 40 MG: 40 INJECTION SUBCUTANEOUS at 06:10

## 2021-08-31 RX ADMIN — DEXTROSE MONOHYDRATE 500 MG: 50 INJECTION, SOLUTION INTRAVENOUS at 06:10

## 2021-08-31 RX ADMIN — KETOROLAC TROMETHAMINE 30 MG: 30 INJECTION, SOLUTION INTRAMUSCULAR at 01:08

## 2021-08-31 RX ADMIN — ACETAMINOPHEN 650 MG: 325 TABLET, FILM COATED ORAL at 16:27

## 2021-08-31 ASSESSMENT — COGNITIVE AND FUNCTIONAL STATUS - GENERAL
MOVING TO AND FROM BED TO CHAIR: A LOT
CLIMB 3 TO 5 STEPS WITH RAILING: TOTAL
STANDING UP FROM CHAIR USING ARMS: A LITTLE
WALKING IN HOSPITAL ROOM: A LOT
DAILY ACTIVITIY SCORE: 18
EATING MEALS: A LITTLE
DRESSING REGULAR LOWER BODY CLOTHING: A LITTLE
PERSONAL GROOMING: A LITTLE
MOBILITY SCORE: 13
HELP NEEDED FOR BATHING: A LITTLE
SUGGESTED CMS G CODE MODIFIER DAILY ACTIVITY: CK
DRESSING REGULAR UPPER BODY CLOTHING: A LITTLE
TOILETING: A LITTLE
SUGGESTED CMS G CODE MODIFIER MOBILITY: CL
MOVING FROM LYING ON BACK TO SITTING ON SIDE OF FLAT BED: A LOT
TURNING FROM BACK TO SIDE WHILE IN FLAT BAD: A LITTLE

## 2021-08-31 ASSESSMENT — PATIENT HEALTH QUESTIONNAIRE - PHQ9
SUM OF ALL RESPONSES TO PHQ9 QUESTIONS 1 AND 2: 0
1. LITTLE INTEREST OR PLEASURE IN DOING THINGS: NOT AT ALL

## 2021-08-31 ASSESSMENT — PAIN DESCRIPTION - PAIN TYPE
TYPE: ACUTE PAIN
TYPE: ACUTE PAIN

## 2021-08-31 ASSESSMENT — ACTIVITIES OF DAILY LIVING (ADL): TOILETING: INDEPENDENT

## 2021-08-31 ASSESSMENT — GAIT ASSESSMENTS: GAIT LEVEL OF ASSIST: UNABLE TO PARTICIPATE

## 2021-08-31 NOTE — THERAPY
Physical Therapy   Initial Evaluation     Patient Name: Norm Prabhakar  Age:  41 y.o., Sex:  male  Medical Record #: [id number]  Today's Date: 8/31/2021     Precautions: Fall Risk  Comments: R sided deficits    Assessment  Patient is 41 y.o. male presenting acutely with c/o R sided weakness and aphasia. PMH includes DM, HTN, seizure disorder, PTSD, depression, migraine, and conversion disorder. Pt with 19 ED presentations this year. He is well known to the acute therapy department with the same previous complaints of R-sided weakness which have resolved in the past. Pt with inconsistent findings that seem to be non-organic in nature. He is unable to move R-sided extremities to command however maintains positioning vs extremity falling to the side associated with flaccidity. Muscle tone is normal in all extremities. He is able to perform STS with min A and maintain standing, intermittent R knee hyperextension noted and he would not attempt any standing marching or side stepping.    Given current fxnl deficits, recommend post-acute placement as he is unable to ambulate at this time. Encourage frequent OOB activity to improve fxn. Will continually reassess for DC back to group home.    Plan    Recommend Physical Therapy 4 times per week until therapy goals are met for the following treatments:  Bed Mobility, Gait Training, Neuro Re-Education / Balance, Stair Training, Therapeutic Activities and Therapeutic Exercises    DC Equipment Recommendations: Unable to determine at this time  Discharge Recommendations: Other - (see assessment)     Objective     08/31/21 1010   Prior Living Situation   Prior Services None   Housing / Facility Group Home;2 Story House (bedroom on top floor)   Steps In Home (FOS)   Equipment Owned Front-Wheel Walker (has been DC'd with FWW In past)   Lives with - Patient's Self Care Capacity Attendant / Paid Care Giver   Prior Level of Functional Mobility   Bed Mobility Independent   Transfer Status  Independent   Ambulation Independent   Distance Ambulation (Feet) (Community distances)   Assistive Devices Used None   Stairs Independent   Comments States he has been recently working out at the gym, multiple prior ED admissions for similar reasons, hx of conversion disorder   Cognition    Speech/ Communication Delayed Responses;Slurred (stuttering)   Level of Consciousness Alert   Active ROM Lower Body    Comments RLE volitional mvt impaired   Strength Lower Body   Comments No purposeful mvts of RLE during strength assessment, able to maintain standing with FWW, intermittent knee hyperextension noted, would not attempt marching in place   Neurological Concerns   Comments given R sided weakness, hx of conversion disorder   Balance Assessment   Sitting Balance (Static) Fair +   Sitting Balance (Dynamic) Fair +   Standing Balance (Static) Fair -   Standing Balance (Dynamic) Fair - (for stand>sit, would not attempt side stepping)   Gait Analysis   Gait Level Of Assist Unable to Participate   Weight Bearing Status No restrictions   Bed Mobility    Supine to Sit Moderate Assist   Sit to Supine Minimal Assist (for RLE negotiation)   Functional Mobility   Sit to Stand Minimal Assist   Bed, Chair, Wheelchair Transfer (Declined)   Short Term Goals    Short Term Goal # 1 Pt will perform supine<>sit with HOB flat and SPV within 6 visits to improve bed mob negotiation at [place].   Short Term Goal # 2 Pt will perform all fxnl transfers with FWW and SPV within 6 visits to increase OOB activity.   Short Term Goal # 3 Pt will amb >150ft with FWW and SPV within 6 visits to improve household negotiation at [place].   Short Term Goal # 4 Pt will ascend/descend FOS with UHR hold and min A Within 6 visits to access bedroom at group home.

## 2021-08-31 NOTE — ED NOTES
Patient transferred from red 3 for right side paralysis; CT showed no acute stroke at this time; pending MRI fpr further workup. Patient appears to be calm at this time and in no distress.

## 2021-08-31 NOTE — DISCHARGE SUMMARY
"Discharge Summary    CHIEF COMPLAINT ON ADMISSION  Chief Complaint   Patient presents with   • Possible Stroke     Pt reports he started developing numbness/tingling while walking today. Presented to ER with R side paralysis and aphasia.        Reason for Admission  STROKE     Admission Date  8/30/2021    CODE STATUS  Full Code    HPI & HOSPITAL COURSE  This is a 41 y.o. male who presented 8/30/2021 with acute onset right-sided weakness.  Medical history is significant for diabetes, hypertension, seizure disorder, PTSD, depression, migraine, hypothyroid.  Patient states that he was walking home from the gym today when he had sudden onset of right-sided weakness.  Patient additionally reports changes in his speech and is now experiencing stuttering. Chart review reveals that he was treated at [facility] for the same symptoms in March 2021. This was called \"recurrent right sided weakness\" at that time. He had a negative stroke workup including MRI at that time and was discharged back to his baseline in a number of days.    Upon presentation to the ED.  Presenting vitals: /75, RR 1 6, HR 87, T 98, SPO2 94% on room air. Laboratory evaluation was largely unremarkable. CT head demonstrated no acute process, CTA head/neck demonstrated no acute process, CT perfusion  T Max more than 6 seconds likely representing combination of completed infarct and ischemia. Neurology was consulted and indicated MRI unlikely to provide further information. Recommended psychiatry consultation. Depakote level checked and is within therapeutic range. The patient is referred to outpatient Psychiatry following discharge      Therefore, he is discharged in fair and stable condition to home with close outpatient follow-up.    The patient recovered much more quickly than anticipated on admission.    Discharge Date   8/31/2021    FOLLOW UP ITEMS POST DISCHARGE  PCP within 1 week  Outpatient Psychiatry    DISCHARGE DIAGNOSES  Principal Problem:    " Hemiparesis (HCC) POA: Yes  Active Problems:    Acquired hypothyroidism POA: Yes    Paralysis on one side of body (HCC) POA: Yes    Depression POA: Yes    Type 2 diabetes mellitus without complication, without long-term current use of insulin (HCC) POA: Yes    Conversion disorder POA: Yes  Resolved Problems:    * No resolved hospital problems. *    FOLLOW UP  No future appointments.  NICHOL Stern  [id number]  [facility] [id number] [phone]    In 1 week      MEDICATIONS ON DISCHARGE     Medication List      ASK your doctor about these medications      Instructions   albuterol 108 (90 Base) MCG/ACT Aers inhalation aerosol   Inhale 2 Puffs every 6 hours as needed for Shortness of Breath.  Dose: 2 Puff     atorvastatin 10 MG Tabs  Commonly known as: LIPITOR   Take 10 mg by mouth every evening.  Dose: 10 mg     divalproex 500 MG Tbec  Commonly known as: DEPAKOTE   Take 500-1,500 mg by mouth 2 times a day. 1 tablet = 500 mg every morning  3 tablets = 1500 mg every evening  Dose: 500-1,500 mg     docusate sodium 100 MG Caps  Commonly known as: COLACE   Take 100 mg by mouth every day.  Dose: 100 mg     Fenofibrate 134 MG Caps   Take 134 mg by mouth every day.  Dose: 134 mg     fish oil 1000 MG Caps capsule   Take 1,000 mg by mouth 2 Times a Day.  Dose: 1,000 mg     insulin glargine 100 UNIT/ML Sopn injection  Generic drug: insulin glargine   Inject 30 Units under the skin 2 times a day.  Dose: 30 Units     Januvia 100 MG Tabs  Generic drug: SITagliptin   Take 100 mg by mouth every day.  Dose: 100 mg     Jardiance 25 MG Tabs  Generic drug: Empagliflozin   Take 25 mg by mouth every day.  Dose: 25 mg     Latuda 20 MG Tabs  Generic drug: lurasidone   Take 20 mg by mouth every evening.  Dose: 20 mg     * levothyroxine 25 MCG Tabs  Commonly known as: SYNTHROID   Take 200 mcg by mouth every morning on an empty stomach. Take in addition to 200 mcg tablet for daily dose of 225 mcg  Dose: 200 mcg     *  "levothyroxine 200 MCG Tabs  Commonly known as: SYNTHROID   Take 200 mcg by mouth every morning on an empty stomach. Take in addition to 25 mcg tablet for daily dose of 225 mcg  Dose: 200 mcg     lisinopril 10 MG Tabs  Commonly known as: PRINIVIL   Take 10 mg by mouth every day.  Dose: 10 mg     metformin 1000 MG tablet  Commonly known as: GLUCOPHAGE   Take 1,000 mg by mouth 2 times a day with meals.  Dose: 1,000 mg     montelukast 10 MG Tabs  Commonly known as: SINGULAIR   Take 10 mg by mouth every evening.  Dose: 10 mg     prazosin 2 MG Caps  Commonly known as: MINIPRESS   Take 4 mg by mouth every evening. 2 caps = 4 mg  Dose: 4 mg     sertraline 100 MG Tabs  Commonly known as: Zoloft   Take 100 mg by mouth every day.  Dose: 100 mg     Symbicort 160-4.5 MCG/ACT Aero  Generic drug: budesonide-formoterol   Inhale 2 Puffs 2 Times a Day.  Dose: 2 Puff     Vitamin D3 2000 UNIT Caps   Take 4,000 Units by mouth every evening.  Dose: 4,000 Units         * This list has 2 medication(s) that are the same as other medications prescribed for you. Read the directions carefully, and ask your doctor or other care provider to review them with you.                Allergies  Allergies   Allergen Reactions   • Geodon [Ziprasidone Hcl] Anaphylaxis     Anaphylaxis per patient   • Abilify      \"Feeling tired, like I don't even know whats going on around me\"   • Fish      Pt reports fish causes him to be sick to his stomach  Not listed on MAR noted 2/3/2021         DIET  No orders of the defined types were placed in this encounter.      ACTIVITY  As tolerated.  Weight bearing as tolerated    CONSULTATIONS  Neurology    PROCEDURES  N/A    LABORATORY  Lab Results   Component Value Date    SODIUM 138 08/30/2021    POTASSIUM 3.8 08/30/2021    CHLORIDE 101 08/30/2021    CO2 22 08/30/2021    GLUCOSE 124 (H) 08/30/2021    BUN 19 08/30/2021    CREATININE 1.15 08/30/2021        Lab Results   Component Value Date    WBC 12.0 (H) 08/30/2021    " HEMOGLOBIN 14.0 08/30/2021    HEMATOCRIT 43.2 08/30/2021    PLATELETCT 331 08/30/2021        Total time of the discharge process exceeds 31 minutes.

## 2021-08-31 NOTE — H&P
Hospital Medicine History & Physical Note    Date of Service  8/30/2021    Primary Care Physician  SHANTA Stern.    Consultants  neurology    Specialist Names: Dr. Harris    Code Status  Full Code    Chief Complaint  Chief Complaint   Patient presents with   • Possible Stroke     Pt reports he started developing numbness/tingling while walking today. Presented to ER with R side paralysis and aphasia.        History of Presenting Illness  Norm Prabhakar is a 41 y.o. male who presented 8/30/2021 with acute onset right-sided weakness.  Medical history is significant for diabetes, hypertension, seizure disorder, PTSD, depression, migraine, hypothyroid.  Patient states that he was walking home from the gym today when he had sudden onset of right-sided weakness.  Patient additionally reports changes in his speech and is now experiencing stuttering.  Upon presentation to the ED.  Presenting vitals: /75, RR 1 6, HR 87, T 98, SPO2 94% on room air. Laboratory evaluation was largely unremarkable CT head demonstrated no acute process, CTA head/neck demonstrated no acute process, CT perfusion  T Max more than 6 seconds likely representing combination of completed infarct and ischemia. CXR with no       Review of Systems  Review of Systems   Constitutional: Negative for chills, fever and malaise/fatigue.   HENT: Negative for congestion and sore throat.    Respiratory: Negative for cough and shortness of breath.    Cardiovascular: Negative for chest pain and palpitations.   Gastrointestinal: Negative for abdominal pain, nausea and vomiting.   Genitourinary: Negative for dysuria and frequency.   Musculoskeletal: Negative for falls and myalgias.   Skin: Negative for rash.   Neurological: Positive for speech change and weakness.   Psychiatric/Behavioral: Negative for depression, hallucinations and substance abuse.       Past Medical History   has a past medical history of Anxiety, ASTHMA, Bipolar 1 disorder (HCC),  "Depression, Diabetes ([facility]), Fall, Glaucoma, Glaucoma (1982), Hypothyroidism, Indigestion, Mental disorder, Murmur, Pneumonia, Psychiatric problem (2002), S/P thyroidectomy, Seizure (HCC) (2010), Seizure disorder ([facility]), and Unspecified disorder of thyroid.    Surgical History   has a past surgical history that includes eye surgery; thyroid lobectomy; and other.     Family History  family history includes Heart Disease in his mother; Hypertension in his mother; Lung Disease in his mother; Stroke in his maternal grandmother.   Family history reviewed with patient. There is no family history that is pertinent to the chief complaint.     Social History   reports that he quit smoking about 16 months ago. His smoking use included cigarettes and cigars. He smoked 0.25 packs per day. He has never used smokeless tobacco. He reports previous alcohol use. He reports current drug use. Drug: Inhaled.    Allergies  Allergies   Allergen Reactions   • Geodon [Ziprasidone Hcl] Anaphylaxis     Anaphylaxis per patient   • Abilify      \"Feeling tired, like I don't even know whats going on around me\"   • Fish      Pt reports fish causes him to be sick to his stomach  Not listed on MAR noted 2/3/2021         Medications  Prior to Admission Medications   Prescriptions Last Dose Informant Patient Reported? Taking?   Cholecalciferol (VITAMIN D3) 2000 UNIT Cap  Other Facility Yes No   Sig: Take 4,000 Units by mouth at bedtime. 2 caps = 4,000 units   Empagliflozin (JARDIANCE) 25 MG Tab  Other Facility Yes No   Sig: Take 25 mg by mouth every day.   Fenofibrate 134 MG Cap  Other Facility Yes No   Sig: Take 134 mg by mouth every day.   Omega-3 Fatty Acids (FISH OIL) 1000 MG Cap capsule  Other Facility Yes No   Sig: Take 1,000 mg by mouth 2 Times a Day.   SITagliptin (JANUVIA) 100 MG Tab  Other Facility Yes No   Sig: Take 100 mg by mouth every day.   albuterol 108 (90 Base) MCG/ACT Aero Soln inhalation aerosol  Other Facility Yes No   Sig: Inhale " 2 Puffs every 6 hours as needed for Shortness of Breath.   atorvastatin (LIPITOR) 10 MG Tab  Other Facility Yes No   Sig: Take 10 mg by mouth every evening.   budesonide-formoterol (SYMBICORT) 160-4.5 MCG/ACT Aerosol  Other Facility Yes No   Sig: Inhale 2 Puffs 2 Times a Day.   divalproex (DEPAKOTE) 500 MG Tablet Delayed Response  Other Facility Yes No   Sig: Take 500-1,500 mg by mouth 2 times a day. 1 tablet = 500 mg every morning  3 tablets = 1500 mg every evening   docusate sodium (COLACE) 100 MG Cap  Other Facility Yes No   Sig: Take 100 mg by mouth every day.   insulin glargine (BASAGLAR KWIKPEN) 100 UNIT/ML injection PEN  Other Facility Yes No   Sig: Inject 30 Units under the skin 2 times a day.   levothyroxine (SYNTHROID) 200 MCG Tab  Other Facility Yes No   Sig: Take 200 mcg by mouth every morning on an empty stomach. Take in addition to 25 mcg tablet for daily dose of 225 mcg   levothyroxine (SYNTHROID) 25 MCG Tab  Other Facility Yes No   Sig: Take 200 mcg by mouth every morning on an empty stomach. Take in addition to 200 mcg tablet for daily dose of 225 mcg   lisinopril (PRINIVIL) 10 MG Tab  Other Facility Yes No   Sig: Take 10 mg by mouth every day.   lurasidone (LATUDA) 20 MG Tab  Other Facility Yes No   Sig: Take 20 mg by mouth every bedtime.   metformin (GLUCOPHAGE) 1000 MG tablet  Other Facility Yes No   Sig: Take 1,000 mg by mouth 2 times a day with meals.   montelukast (SINGULAIR) 10 MG Tab  Other Facility Yes No   Sig: Take 10 mg by mouth every bedtime.   ondansetron (ZOFRAN ODT) 4 MG TABLET DISPERSIBLE  Other Facility No No   Sig: Take 1 Tab by mouth every 6 hours as needed.   Patient taking differently: Take 4 mg by mouth every 6 hours as needed. Indications: Nausea and Vomiting   prazosin (MINIPRESS) 2 MG Cap  Other Facility Yes No   Sig: Take 4 mg by mouth at bedtime. 2 caps = 4 mg   sertraline (ZOLOFT) 100 MG Tab  Other Facility Yes No   Sig: Take 100 mg by mouth every day.       Facility-Administered Medications: None       Physical Exam  Temp:  [36.7 °C (98 °F)] 36.7 °C (98 °F)  Pulse:  [72-87] 72  Resp:  [14-18] 16  BP: (122-141)/(67-75) 122/67  SpO2:  [94 %-98 %] 98 %    Physical Exam  Constitutional:       General: He is not in acute distress.     Appearance: He is not toxic-appearing.   HENT:      Head: Normocephalic.      Right Ear: External ear normal.      Left Ear: External ear normal.      Nose: Nose normal.      Mouth/Throat:      Mouth: Mucous membranes are moist.      Pharynx: No oropharyngeal exudate.   Eyes:      General: No scleral icterus.  Cardiovascular:      Rate and Rhythm: Normal rate and regular rhythm.      Heart sounds: No murmur heard.     Pulmonary:      Effort: Pulmonary effort is normal. No respiratory distress.      Breath sounds: No wheezing.   Abdominal:      Palpations: Abdomen is soft.      Tenderness: There is no abdominal tenderness. There is no guarding or rebound.   Musculoskeletal:         General: No swelling or deformity.   Skin:     General: Skin is warm.      Capillary Refill: Capillary refill takes less than 2 seconds.      Coloration: Skin is not jaundiced.      Findings: No bruising.   Neurological:      General: No focal deficit present.      Mental Status: He is alert and oriented to person, place, and time.      Comments: Stuttering speech, no sensation in right upper extremity, no sensation in right lower extremity, +1/5 muscle strength in right upper and right lower extremities.   Psychiatric:         Mood and Affect: Mood normal.         Behavior: Behavior normal.         Thought Content: Thought content normal.         Judgment: Judgment normal.         Laboratory:  Recent Labs     08/30/21  1559   WBC 12.0*   RBC 4.96   HEMOGLOBIN 14.0   HEMATOCRIT 43.2   MCV 87.1   MCH 28.2   MCHC 32.4*   RDW 48.4   PLATELETCT 331   MPV 9.7     Recent Labs     08/30/21  1559   SODIUM 138   POTASSIUM 3.8   CHLORIDE 101   CO2 22   GLUCOSE 124*   BUN 19    CREATININE 1.15   CALCIUM 10.0     Recent Labs     08/30/21  1559   ALTSGPT 16   ASTSGOT 17   ALKPHOSPHAT 51   TBILIRUBIN 0.3   GLUCOSE 124*     Recent Labs     08/30/21  1559   APTT 27.0   INR 1.10     No results for input(s): NTPROBNP in the last 72 hours.      Recent Labs     08/30/21  1559   TROPONINT 17       Imaging:  DX-CHEST-PORTABLE (1 VIEW)   Final Result      No acute cardiopulmonary disease evident.      CT-CTA NECK WITH & W/O-POST PROCESSING   Final Result      1.  No carotid or vertebral artery stenosis or occlusion.      2.  Stable appearance of 2 enhancing masses in the paratracheal region of the neck of uncertain etiology. Possible ectopic thyroid tissue.      CT-CTA HEAD WITH & W/O-POST PROCESS   Final Result      CT angiogram of the [place] within normal limits.      CT-CEREBRAL PERFUSION ANALYSIS   Final Result      1.  Cerebral blood flow less than 30% likely representing completed infarct = 0 mL.      2.  T Max more than 6 seconds likely representing combination of completed infarct and ischemia = 14 mL.      3.  Mismatched volume likely representing ischemic brain/penumbra = Infinite      4.  Please note that the cerebral perfusion was performed on the limited brain tissue around the basal ganglia region. Infarct/ischemia outside the CT perfusion sections can be missed in this study.      CT-HEAD W/O   Final Result      1.  No definite acute intracranial abnormality.   2.  Redemonstrated diffuse confluent periventricular and subcortical hypoattenuation which may represent severe chronic white matter leukoencephalopathy.          X-Ray:  I have personally reviewed the images and compared with prior images.  EKG:  I have personally reviewed the images and compared with prior images.    Assessment/Plan:  I anticipate this patient is appropriate for observation status at this time.    * Hemiparesis (HCC)- (present on admission)  Assessment & Plan  Patient presented after acute onset of  right-sided hemiparesis  Patient also has change in speech pattern with stuttering  CT head demonstrated no acute process, CTA head/neck demonstrated no acute process, CT perfusion  T Max more than 6 seconds likely representing combination of completed infarct and ischemia  Neurology consulted  No plan for MRI  Possibly component of migraine vs conversion disorder  PT/OT  SLP    Conversion disorder- (present on admission)  Assessment & Plan  History of    Type 2 diabetes mellitus without complication, without long-term current use of insulin (HCC)- (present on admission)  Assessment & Plan  Insulin sliding scale    Depression- (present on admission)  Assessment & Plan  Zoloft    Paralysis on one side of body (HCC)- (present on admission)  Assessment & Plan  Neurology consulted    Acquired hypothyroidism- (present on admission)  Assessment & Plan  synthroid      VTE prophylaxis: SCDs/TEDs and enoxaparin ppx

## 2021-08-31 NOTE — DISCHARGE INSTRUCTIONS
Discharge Instructions    Discharged to home by car with self. Discharged via wheelchair, hospital escort: Yes.  Special equipment needed: Not Applicable    Be sure to schedule a follow-up appointment with your primary care doctor or any specialists as instructed.     Discharge Plan:   Diet Plan: Discussed  Activity Level: Discussed  Confirmed Follow up Appointment: Patient to Call and Schedule Appointment  Confirmed Symptoms Management: Discussed  Medication Reconciliation Updated: Yes    I understand that a diet low in cholesterol, fat, and sodium is recommended for good health. Unless I have been given specific instructions below for another diet, I accept this instruction as my diet prescription.   Other diet: low fat    Special Instructions: None    · Is patient discharged on Warfarin / Coumadin?   No     Depression / Suicide Risk    As you are discharged from this [facility] facility, it is important to learn how to keep safe from harming yourself.    Recognize the warning signs:  · Abrupt changes in personality, positive or negative- including increase in energy   · Giving away possessions  · Change in eating patterns- significant weight changes-  positive or negative  · Change in sleeping patterns- unable to sleep or sleeping all the time   · Unwillingness or inability to communicate  · Depression  · Unusual sadness, discouragement and loneliness  · Talk of wanting to die  · Neglect of personal appearance   · Rebelliousness- reckless behavior  · Withdrawal from people/activities they love  · Confusion- inability to concentrate     If you or a loved one observes any of these behaviors or has concerns about self-harm, here's what you can do:  · Talk about it- your feelings and reasons for harming yourself  · Remove any means that you might use to hurt yourself (examples: pills, rope, extension cords, firearm)  · Get professional help from the community (Mental Health, Substance Abuse, psychological  counseling)  · Do not be alone:Call your Safe Contact- someone whom you trust who will be there for you.  · Call your local CRISIS HOTLINE 949-6554 or 743-488-5451  · Call your local Children's Mobile Crisis Response Team [place] (262) 781-5266 or www.ANDalyze  · Call the toll free National Suicide Prevention Hotlines   · National Suicide Prevention Lifeline 348-655-EIDX (1898)  · National Hope Line Network 800-SUICIDE (819-8461)

## 2021-08-31 NOTE — ED NOTES
Mild aphasia noted with delay in speech.  Patient continues to report R upper and lower extremities flaccid, and decreased sensation.     Patient updated on POC, awaiting admit room assignment.

## 2021-08-31 NOTE — CARE PLAN
The patient is Stable - Low risk of patient condition declining or worsening    Shift Goals  Clinical Goals: MRI in AM  Patient Goals: MRI / comfort  Family Goals: not present      Problem: Knowledge Deficit - Standard  Goal: Patient and family/care givers will demonstrate understanding of plan of care, disease process/condition, diagnostic tests and medications  Outcome: Progressing     Problem: Pain - Standard  Goal: Alleviation of pain or a reduction in pain to the patient’s comfort goal  Outcome: Progressing     Problem: Skin Integrity  Goal: Skin integrity is maintained or improved  Outcome: Progressing

## 2021-08-31 NOTE — ED NOTES
Called and verified with [facility]  [facility] assigned is still not clean and ready at this time.  will request for STAT clean; will call back to check on bed assigned.

## 2021-08-31 NOTE — ASSESSMENT & PLAN NOTE
Patient presented after acute onset of right-sided hemiparesis  Patient also has change in speech pattern with stuttering  CT head demonstrated no acute process, CTA head/neck demonstrated no acute process, CT perfusion  T Max more than 6 seconds likely representing combination of completed infarct and ischemia  Neurology consulted  No plan for MRI  Possibly component of migraine vs conversion disorder  PT/OT  SLP

## 2021-08-31 NOTE — ED NOTES
Med rec updated and complete. Allergies reviewed.  No antibiotic use in last 30 days.      Per phone interview with  [facility] @ [phone]  All morning doses taken.        Home pharmacy [facility] [phone]        Medication Sig Dispense Refill   • metformin (GLUCOPHAGE) 1000 MG tablet Take 1,000 mg by mouth 2 times a day with meals.     • docusate sodium (COLACE) 100 MG Cap Take 100 mg by mouth every day.     • levothyroxine (SYNTHROID) 200 MCG Tab Take 200 mcg by mouth every morning on an empty stomach. Take in addition to 25 mcg tablet for daily dose of 225 mcg     • divalproex (DEPAKOTE) 500 MG Tablet Delayed Response Take 500-1,500 mg by mouth 2 times a day. 1 tablet = 500 mg every morning  3 tablets = 1500 mg every evening     • SITagliptin (JANUVIA) 100 MG Tab Take 100 mg by mouth every day.     • Fenofibrate 134 MG Cap Take 134 mg by mouth every day.     • albuterol 108 (90 Base) MCG/ACT Aero Soln inhalation aerosol Inhale 2 Puffs every 6 hours as needed for Shortness of Breath.     • sertraline (ZOLOFT) 100 MG Tab Take 100 mg by mouth every day.     • levothyroxine (SYNTHROID) 25 MCG Tab Take 200 mcg by mouth every morning on an empty stomach. Take in addition to 200 mcg tablet for daily dose of 225 mcg     • Empagliflozin (JARDIANCE) 25 MG Tab Take 25 mg by mouth every day.     • budesonide-formoterol (SYMBICORT) 160-4.5 MCG/ACT Aerosol Inhale 2 Puffs 2 Times a Day.     • lurasidone (LATUDA) 20 MG Tab Take 20 mg by mouth every evening.     • atorvastatin (LIPITOR) 10 MG Tab Take 10 mg by mouth every evening.     • lisinopril (PRINIVIL) 10 MG Tab Take 10 mg by mouth every day.     • Cholecalciferol (VITAMIN D3) 2000 UNIT Cap Take 4,000 Units by mouth every evening.     • prazosin (MINIPRESS) 2 MG Cap Take 4 mg by mouth every evening. 2 caps = 4 mg     • montelukast (SINGULAIR) 10 MG Tab Take 10 mg by mouth every evening.     • Omega-3 Fatty Acids (FISH OIL) 1000 MG Cap capsule Take 1,000  mg by mouth 2 Times a Day.     • insulin glargine (BASAGLAR KWIKPEN) 100 UNIT/ML injection PEN Inject 30 Units under the skin 2 times a day.

## 2021-08-31 NOTE — PROGRESS NOTES
4 Eyes Skin Assessment Completed by Kelley, RN and Dick RN.    Head WDL  Ears WDL  Nose WDL  Mouth WDL  Neck WDL  Breast/Chest WDL  Shoulder Blades WDL  Spine WDL  (R) Arm/Elbow/Hand WDL  (L) Arm/Elbow/Hand WDL  Abdomen WDL  Groin WDL  Scrotum/Coccyx/Buttocks WDL  (R) Leg WDL  (L) Leg WDL  (R) Heel/Foot/Toe WDL  (L) Heel/Foot/Toe WDL          Devices In Places Pulse Ox and Nasal Cannula      Interventions In Place NC W/Ear Foams and Pillows    Possible Skin Injury No    Pictures Uploaded Into Epic N/A  Wound Consult Placed N/A  RN Wound Prevention Protocol Ordered No

## 2021-08-31 NOTE — THERAPY
"Speech Language Pathology   Clinical Swallow Evaluation     Patient Name: Norm Prabhakar  AGE:  41 y.o., SEX:  male  Medical Record #: [id number]  Today's Date: 8/31/2021     Precautions: Fall Risk  Comments: R sided deficits    Assessment    Patient is 41 y.o. male admitted for possible stroke.     PMHx: diabetes, hypertension, seizure disorder, PTSD, depression, migraine, hypothyroid.     CXR: No acute cardiopulmonary disease evident.    CT of head:  1.  No definite acute intracranial abnormality.  2.  Redemonstrated diffuse confluent periventricular and subcortical hypoattenuation which may represent severe chronic white matter leukoencephalopathy.    Patient was seen on this date for a clinical swallow evaluation. Patient was AAOx4. Speech consisted of stutter-like disfluencies characterized by: significant repetition of initial sound and mild halting. Patient reported worsening \"stuttering\" compared to baseline.     Oral motor examination revealed mildly misaligned jaw, under bite, and labial and lingual weakness and ROM. No gross asymmetry. Voice was WNL and natural dentition intact.     PO trials were administered and consisted of thin liquids (cup/straw), pudding, and dry solids. Onset of swallow trigger was timely. Mastication was reduced but functional for regular solids. No overt s/sx of aspiration appreciated across all trials tested including consecutive sips of thins from cup and straw.     Education provided to pt regarding current status and SLP recs.     Plan    -Recommend a regular/thin liquid diet.     -Meds whole with liquid wash.     -SLP following 1-2 times likely to ensure diet tolerance.     Recommend Speech Therapy 2 times per week until therapy goals are met for the following treatments:  Dysphagia Training and Patient / Family / Caregiver Education.    Discharge Recommendations: Recommend post-acute placement for additional speech therapy services prior to discharge home    Objective       " 08/31/21 0936   Vitals   O2 (LPM) 2   O2 Delivery Device Nasal Cannula   Prior Level Of Function   Communication Impaired  (baseline stutter-like dysfluencies )   Swallow Within Functional Limits   Dentition Intact   Dentures None   Hearing Within Functional Limits for Evaluation   Hearing Aid None   Vision Within Functional Limits for Evaluation   Patient's Primary Language English   Oral Motor Eval    Is Patient Able to Complete Oral Motor Eval Yes but Impaired   Labial Function   Labial Structure At Rest Within Functional Limits   Labial Vowel Production / I /, / U / Moderate   Labial Sequence / I /, / U / Moderate   Lingual Function   Lingual Structure At Rest Within Functional Limits   Lingual Protrude Minimal   Lingual Retract Minimal   Elevate In Mouth Minimal   Lateralization Minimal Right;Minimal Left   Lick Lips (Circular) Minimal   / Pa / 5X's Moderate   Jaw   Jaw Structure At Rest Minimal   Bite ([place]) Minimal   Chew ([place]) Minimal   Velar Function   Velar Structure At Rest Within Functional Limits   / A / Prolonged Within Functional Limits   Laryngeal Function   Voice Quality Within Functional Limits   Volutional Cough Within Functional Limits   Oral Food Presentation   Single Swallow Thin (0) Within Functional Limits   Serial Swallow Thin (0) Within Functional Limits   Pureed (4) Within Functional Limits   Regular (7) Within Functional Limits   Self Feeding Independent   Dysphagia Strategies / Recommendations   Strategies / Interventions Recommended (Yes / No) Yes   Compensatory Strategies Head of Bed 90 Degrees During Eating / Drinking;Monitor During Meals   Diet / Liquid Recommendation Regular (7);Thin (0)   Medication Administration  Whole with Liquid Wash   Therapy Interventions Dysphagia Therapy By Speech Language Pathologist   Short Term Goals   Short Term Goal # 1 Patient will consume a RG7/TN0 diet with no overt s/sx of aspiration.

## 2021-08-31 NOTE — ED NOTES
Patient neuro status remains unchanged, delayed speech, R U/L extremities with no movement. Respirations even and unlabored.

## 2021-08-31 NOTE — CARE PLAN
Problem: Knowledge Deficit - Standard  Goal: Patient and family/care givers will demonstrate understanding of plan of care, disease process/condition, diagnostic tests and medications  Outcome: Progressing   The patient is Stable - Low risk of patient condition declining or worsening    Shift Goals  Clinical Goals:  Patient Goals:  / comfort  Family Goals: not present    Progress made toward(s) clinical / shift goals:  updated poc    Patient is not progressing towards the following goals:

## 2021-08-31 NOTE — THERAPY
"Occupational Therapy   Initial Evaluation     Patient Name: Norm Prabhakar  Age:  41 y.o., Sex:  male  Medical Record #: [id number]  Today's Date: 8/31/2021     Precautions: Fall Risk  Comments: R sided deficits    Assessment  Patient is 41 y.o. male admitted for possible CVA, pt has a complex medical hx including: diabetes, hypertension, seizure disorder, PTSD, depression, migraine, hypothyroid. Additionally pt has had over 20 admissions to the ED this past year. This admission pt is dx w/hemiparesis, ad conversation d/o. Pt is demonstrating non-functional use of R side both UE/LE, but his presentation is inconsistent pt test as flaccid during formal testing but when provided w/functional tasks such as standing w/fww, pt could maintain his grasp on walker handle and demonstrate active WB through UE w/standing. Pt participated in minimal activities this session. At this time pts presentation appears more behavioral than neurological, pt resides in a group home at baseline anticipate improvement in this setting to return to group home.     Plan  Recommend Occupational Therapy 3 times per week until therapy goals are met for the following treatments:  Self Care/Activities of Daily Living, Therapeutic Activities and Therapeutic Exercises.    DC Equipment Recommendations: Unable to determine at this time  Discharge Recommendations: Recommend home health for continued occupational therapy services     Subjective  \" I need to lay down now\"      Objective     08/31/21 1007   Prior Living Situation   Prior Services Other (Comments)   Housing / Facility Group Home   Equipment Owned Front-Wheel Walker   Lives with - Patient's Self Care Capacity Other (Comments)   Prior Level of ADL Function   Self Feeding Independent   Grooming / Hygiene Independent   Bathing Independent   Dressing Independent   Toileting Independent   Prior Level of IADL Function   Medication Management Requires Assist   Laundry Requires Assist   Kitchen " Mobility Requires Assist   Finances Requires Assist   Home Management Requires Assist   Shopping Requires Assist   Prior Level Of Mobility Independent Without Device in Community   Comments pt reports he was at the gym prior to this event    Precautions   Precautions Fall Risk   Pain 0 - 10 Group   Therapist Pain Assessment Prior to Activity;Nurse Notified;During Activity   Cognition    Cognition / Consciousness X   Speech/ Communication Delayed Responses   Level of Consciousness Alert   Comments Delayed more focused on watching his movie then participating in session, demonstrating inconsistent deficits    Passive ROM Upper Body   Passive ROM Upper Body WDL   Active ROM Upper Body   Active ROM Upper Body  X   Dominant Hand Right   Comments not currently actively moving RUE, but did observe some supination/pronation and gross grasp    Strength Upper Body   Upper Body Strength  X   Comments 1/5 in testing but observed improved functional strength during functional tasks, such as maintaining  on the fww    Sensation Upper Body   Upper Extremity Sensation  X   Comments reports diminished sensation    Upper Body Muscle Tone   Upper Body Muscle Tone  X   Comments limited movement but tone appears WFL    Coordination Upper Body   Coordination X   Comments RUE grossly limited by weakness, but weakness presentation is inconsistent    Balance Assessment   Sitting Balance (Static) Fair   Sitting Balance (Dynamic) Fair   Standing Balance (Static) Fair -   Standing Balance (Dynamic) Poor +   Weight Shift Sitting Fair   Weight Shift Standing Poor   Comments w/fww   Bed Mobility    Supine to Sit Minimal Assist   Sit to Supine Minimal Assist   ADL Assessment   Grooming Minimal Assist;Seated   Upper Body Dressing Minimal Assist   Lower Body Dressing Minimal Assist   Toileting   (NT )   How much help from another person does the patient currently need...   6 Clicks Daily Activity Score 18   Functional Mobility   Sit to Stand  Minimal Assist   Mobility EOB sit>stand BTB    Visual Perception   Visual Perception  Not Tested   Edema / Skin Assessment   Edema / Skin  Not Assessed   Activity Tolerance   Comments c/o fatigue    Patient / Family Goals   Patient / Family Goal #1 none stated    Short Term Goals   Short Term Goal # 1 pt will complete toilet txf w/spv    Short Term Goal # 2 pt will complete LB dressing w/spv    Short Term Goal # 3 pt will complete grooming standing at sink w/spv    Education Group   Role of Occupational Therapist Patient Response Patient;Acceptance;Explanation   Problem List   Problem List Unable To Determine At This Time;Decreased Functional Mobility;Decreased Upper Extremity AROM Right   Anticipated Discharge Equipment and Recommendations   DC Equipment Recommendations Unable to determine at this time   Discharge Recommendations Recommend home health for continued occupational therapy services   Interdisciplinary Plan of Care Collaboration   IDT Collaboration with  Nursing   Patient Position at End of Therapy In Bed;Call Light within Reach;Tray Table within Reach;Phone within Reach   Collaboration Comments RN aware of OT eval and pts efforts    Session Information   Date / Session Number  8/31 #1 (1/3, 9/6)   Priority 2

## 2021-09-01 ENCOUNTER — PATIENT OUTREACH (OUTPATIENT)
Dept: HEALTH INFORMATION MANAGEMENT | Facility: OTHER | Age: 41
End: 2021-09-01

## 2021-09-01 NOTE — PROGRESS NOTES
Called [facility], they are unable to facilitate a ride due to the individuals need of a wheelchair and the pt does not have one of his own. Contacted case management for assistance of medical transport.

## 2021-09-01 NOTE — PROGRESS NOTES
MIGUEL Oden attempted to re-introduce Community Care management but the patient's cell phone is not in service.     CHW will attempt again at a later date.

## 2021-09-01 NOTE — PROGRESS NOTES
DCED INSTRUCTIONS GIVEN ALL QUESTIONS ANSWERED  TO LOBBY VIA W/C AND STAFF ASSISTED INTO THE CAB  CONDITION STABLE

## 2021-09-03 SDOH — ECONOMIC STABILITY: FOOD INSECURITY: WITHIN THE PAST 12 MONTHS, THE FOOD YOU BOUGHT JUST DIDN'T LAST AND YOU DIDN'T HAVE MONEY TO GET MORE.: SOMETIMES TRUE

## 2021-09-03 SDOH — ECONOMIC STABILITY: FOOD INSECURITY: WITHIN THE PAST 12 MONTHS, YOU WORRIED THAT YOUR FOOD WOULD RUN OUT BEFORE YOU GOT MONEY TO BUY MORE.: SOMETIMES TRUE

## 2021-09-03 ASSESSMENT — SOCIAL DETERMINANTS OF HEALTH (SDOH): HOW HARD IS IT FOR YOU TO PAY FOR THE VERY BASICS LIKE FOOD, HOUSING, MEDICAL CARE, AND HEATING?: NOT VERY HARD

## 2021-09-03 NOTE — PROGRESS NOTES
Community Health Worker Vinson called the patient and introduced Community Care Management. Patient states that he utilizes the bus, walks, and is aware of [facility]. Patient recently lost his wallet on the bus which had all of his information including his SNAP card. Patient would like a food delivery from the [facility]. Patient has not yet scheduled an appointment with NICHOL Stern but their office is closed 9/3 - 9/7. Patient will schedule once the office is open. CHW discussed referral to psychiatry. Patient had an interview today and reports no other needs.     Community Health Worker Intake  • Social determinates of health intake complete.   • Contact information provided to Norm Prabhakar   • Scheduled Food Delivery/Home Visit/Outpatient Visit: 9/3/2021  • Outpatient assessment completed.    Plan: CHW will do a home visit, provide food, pantry information, SNAP information, complete food RX application, and then remove the patient if he has no additional needs.

## 2021-10-10 ENCOUNTER — HOSPITAL ENCOUNTER (EMERGENCY)
Facility: MEDICAL CENTER | Age: 41
End: 2021-10-10
Attending: EMERGENCY MEDICINE
Payer: MEDICAID

## 2021-10-10 VITALS
HEIGHT: 78 IN | RESPIRATION RATE: 16 BRPM | WEIGHT: 285.94 LBS | OXYGEN SATURATION: 92 % | HEART RATE: 58 BPM | TEMPERATURE: 97.6 F | DIASTOLIC BLOOD PRESSURE: 67 MMHG | SYSTOLIC BLOOD PRESSURE: 114 MMHG | BODY MASS INDEX: 33.08 KG/M2

## 2021-10-10 DIAGNOSIS — R51.9 ACUTE NONINTRACTABLE HEADACHE, UNSPECIFIED HEADACHE TYPE: ICD-10-CM

## 2021-10-10 PROCEDURE — 99283 EMERGENCY DEPT VISIT LOW MDM: CPT

## 2021-10-10 PROCEDURE — 96372 THER/PROPH/DIAG INJ SC/IM: CPT

## 2021-10-10 PROCEDURE — 700111 HCHG RX REV CODE 636 W/ 250 OVERRIDE (IP): Performed by: EMERGENCY MEDICINE

## 2021-10-10 RX ORDER — DIPHENHYDRAMINE HYDROCHLORIDE 50 MG/ML
25 INJECTION INTRAMUSCULAR; INTRAVENOUS ONCE
Status: COMPLETED | OUTPATIENT
Start: 2021-10-10 | End: 2021-10-10

## 2021-10-10 RX ORDER — KETOROLAC TROMETHAMINE 30 MG/ML
60 INJECTION, SOLUTION INTRAMUSCULAR; INTRAVENOUS ONCE
Status: COMPLETED | OUTPATIENT
Start: 2021-10-10 | End: 2021-10-10

## 2021-10-10 RX ORDER — PROCHLORPERAZINE EDISYLATE 5 MG/ML
10 INJECTION INTRAMUSCULAR; INTRAVENOUS ONCE
Status: COMPLETED | OUTPATIENT
Start: 2021-10-10 | End: 2021-10-10

## 2021-10-10 RX ORDER — SUMATRIPTAN 6 MG/.5ML
6 INJECTION, SOLUTION SUBCUTANEOUS ONCE
Status: COMPLETED | OUTPATIENT
Start: 2021-10-10 | End: 2021-10-10

## 2021-10-10 RX ADMIN — KETOROLAC TROMETHAMINE 60 MG: 30 INJECTION, SOLUTION INTRAMUSCULAR at 14:57

## 2021-10-10 RX ADMIN — SUMATRIPTAN 6 MG: 6 INJECTION, SOLUTION SUBCUTANEOUS at 15:53

## 2021-10-10 RX ADMIN — DIPHENHYDRAMINE HYDROCHLORIDE 25 MG: 50 INJECTION INTRAMUSCULAR; INTRAVENOUS at 14:54

## 2021-10-10 RX ADMIN — PROCHLORPERAZINE EDISYLATE 10 MG: 5 INJECTION INTRAMUSCULAR; INTRAVENOUS at 14:58

## 2021-10-10 ASSESSMENT — PAIN DESCRIPTION - PAIN TYPE: TYPE: ACUTE PAIN

## 2021-10-10 ASSESSMENT — FIBROSIS 4 INDEX: FIB4 SCORE: 0.5

## 2021-10-10 NOTE — ED NOTES
Ambulatory to Y-62 with steady gait. States HA x 6 days. Hx of migraines. Denies any other symptoms. Neurologically intact.

## 2021-10-10 NOTE — DISCHARGE INSTRUCTIONS
Go home and sleep in a dark quiet room, should resolve necessary.  Referrals been placed for you to go to the headache clinic so that you can have complete evaluation, and recommendations for treatment of these headaches.

## 2021-10-10 NOTE — ED TRIAGE NOTES
Pt ambulated to triage with   Chief Complaint   Patient presents with   • Headache     for the last 6 days, not going away, tylenol excerdrin with out relief; h/o migraines and headaches, pt reports difficulty sleeping.       Pt Informed regarding triage process and verbalized understanding to inform triage tech or RN for any changes in condition. Placed in lobby.

## 2021-10-10 NOTE — ED NOTES
Received order for DC. VSS. Educated regarding referral to headache clinic with neurology. Verbalizes an understanding. Ambulate to dress self and ambulate to lobby with steady gait.

## 2021-10-10 NOTE — ED PROVIDER NOTES
ED Provider  Scribed for Augustus Carroll D.O. by Ian Monroy. 10/10/2021  2:32 PM    Means of arrival:Walk-in  History obtained from:Patient  History limited by: None    CHIEF COMPLAINT  Chief Complaint   Patient presents with    Headache     for the last 6 days, not going away, tylenol excerdrin with out relief; h/o migraines and headaches, pt reports difficulty sleeping.        HPI  Norm Prabhakar is a 41 y.o. male who presents for constant mild to moderate headache onset 6 days ago. Patient reports he has taken Tylenol and Excredrin, but with no alleviation. After his symptoms continued to persist, he was prompted to visit the ED today for further evaluation. He reports associated nausea, vomiting, photophobia, but denies any fevers or chills. Patient states he has history of seizures and migraines. He notes his migraines started occurring when he was a child. He states he takes medications for his seizures and notes he will be seeing a Neurologist for his seizures but not for his headaches.       REVIEW OF SYSTEMS  See HPI for further details. All other systems are negative.     PAST MEDICAL HISTORY   has a past medical history of Anxiety, ASTHMA, Bipolar 1 disorder ([facility]), Depression, Diabetes ([facility]), Fall, Glaucoma, Glaucoma (), Hypothyroidism, Indigestion, Mental disorder, Murmur, Pneumonia, Psychiatric problem (), S/P thyroidectomy, Seizure ([facility]) (), Seizure disorder ([facility]), and Unspecified disorder of thyroid.    SOCIAL HISTORY  Social History     Tobacco Use    Smoking status: Former Smoker     Packs/day: 0.25     Types: Cigarettes, Cigars     Quit date: 2020     Years since quittin.5    Smokeless tobacco: Never Used    Tobacco comment: 3 cigarettes a day   Vaping Use    Vaping Use: Former   Substance and Sexual Activity    Alcohol use: Not Currently    Drug use: Yes     Types: Inhaled     Comment: marijuana    Sexual activity: None noted       SURGICAL HISTORY   has a past surgical  "history that includes eye surgery; thyroid lobectomy; and other.    CURRENT MEDICATIONS  Home Medications       Reviewed by Samantha Ponce R.N. (Registered Nurse) on 10/10/21 at 1224  Med List Status: Partial     Medication Last Dose Status   albuterol 108 (90 Base) MCG/ACT Aero Soln inhalation aerosol  Active   atorvastatin (LIPITOR) 10 MG Tab  Active   budesonide-formoterol (SYMBICORT) 160-4.5 MCG/ACT Aerosol  Active   Cholecalciferol (VITAMIN D3) 2000 UNIT Cap  Active   divalproex (DEPAKOTE) 500 MG Tablet Delayed Response  Active   docusate sodium (COLACE) 100 MG Cap  Active   Empagliflozin (JARDIANCE) 25 MG Tab  Active   Fenofibrate 134 MG Cap  Active   insulin glargine (BASAGLAR KWIKPEN) 100 UNIT/ML injection PEN  Active   levothyroxine (SYNTHROID) 200 MCG Tab  Active   levothyroxine (SYNTHROID) 25 MCG Tab  Active   lisinopril (PRINIVIL) 10 MG Tab  Active   lurasidone (LATUDA) 20 MG Tab  Active   metformin (GLUCOPHAGE) 1000 MG tablet  Active   montelukast (SINGULAIR) 10 MG Tab  Active   Omega-3 Fatty Acids (FISH OIL) 1000 MG Cap capsule  Active   prazosin (MINIPRESS) 2 MG Cap  Active   sertraline (ZOLOFT) 100 MG Tab  Active   SITagliptin (JANUVIA) 100 MG Tab  Active                    ALLERGIES  Allergies   Allergen Reactions    Geodon [Ziprasidone Hcl] Anaphylaxis     Anaphylaxis per patient    Abilify      \"Feeling tired, like I don't even know whats going on around me\"    Fish      Pt reports fish causes him to be sick to his stomach  Not listed on MAR noted 2/3/2021         PHYSICAL EXAM  VITAL SIGNS: /75   Pulse 93   Temp 36.4 °C (97.6 °F) (Temporal)   Resp 18   Ht 1.981 m (6' 6\")   Wt (!) 130 kg (285 lb 15 oz)   SpO2 95%   BMI 33.04 kg/m²   Constitutional: Alert in no apparent distress.  HENT: No signs of trauma, mucous membranes are moist  Eyes: Conjunctiva normal, Non-icteric.   Neck: Normal range of motion, No tenderness, Supple.  Lymphatic: No lymphadenopathy noted.   Cardiovascular: " Regular rate and rhythm, no murmurs.   Thorax & Lungs: Normal breath sounds, No respiratory distress, No wheezing, No chest tenderness.   Abdomen: Bowel sounds normal, Soft, No tenderness, No masses, No pulsatile masses. No peritoneal signs.  Skin: Warm, Dry, normal color.   Back: No bony tenderness, No CVA tenderness.   Extremities: No edema, No tenderness, No cyanosis  Musculoskeletal: Good range of motion in all major joints. No tenderness to palpation or major deformities noted.   Neurologic: Left eye strabismus. Pupils are non-reactive. Alert and oriented x4, Normal motor function, Normal sensory function, No focal deficits noted.   Psychiatric: Affect normal, Judgment normal, Mood normal.     DIAGNOSTIC STUDIES / PROCEDURES    RADIOLOGY  No orders to display     The radiologist's interpretations of all radiological studies have been reviewed by me.    Films have been independently by me      COURSE  Pertinent Labs & Imaging studies reviewed. (See chart for details)    2:32 PM - Patient seen and examined at bedside. Discussed plan of care. The patient will be medicated with Toradol, Compazine, and Benadryl.     3:42 PM - Patient was reevaluated at bedside. Patient's headache has mildly improved.The plan for discharge was discussed. Patient was given the opportunity to ask any questions. Patient verbalizes understanding and agreement to this plan of care. Patient will be treated with Imitrex for their symptoms.    MEDICAL DECISION MAKING  This is a 41 y.o. male who presents with a migraine for 6 days he has a history of migraine headaches.  His pain is consistent with previous migraines there is no fever no nuchal rigidity and no thunderclap type onset of this discomfort.    He was medicated with migraine medications and discharged home to sleep until the headache resolves.    A referral was placed for him to go to the headache clinic for further evaluation and treatment.      The patient will return for new or  worsening symptoms and is stable at the time of discharge.    The patient is referred to a primary physician for blood pressure management, diabetic screening, and for all other preventative health concerns.    DISPOSITION:  Patient will be discharged home in stable condition.    FOLLOW UP:  No follow-up provider specified.    OUTPATIENT MEDICATIONS:  Discharge Medication List as of 10/10/2021  4:26 PM           FINAL IMPRESSION  1. Acute nonintractable headache, unspecified headache type         I, Ian Monroy (Robertibe), am scribing for, and in the presence of, Augustus Carroll D.O..    Electronically signed by: Ian Monroy (Scribe), 10/10/2021    I, Augustus Carroll D.O. personally performed the services described in this documentation, as scribed by Ian Monroy in my presence, and it is both accurate and complete.    The note accurately reflects work and decisions made by me.  Augustus Carroll D.O.  10/10/2021  5:51 PM

## 2021-10-12 ENCOUNTER — HOSPITAL ENCOUNTER (EMERGENCY)
Facility: MEDICAL CENTER | Age: 41
End: 2021-10-12
Attending: EMERGENCY MEDICINE
Payer: MEDICAID

## 2021-10-12 ENCOUNTER — APPOINTMENT (OUTPATIENT)
Dept: RADIOLOGY | Facility: MEDICAL CENTER | Age: 41
End: 2021-10-12
Attending: EMERGENCY MEDICINE
Payer: MEDICAID

## 2021-10-12 VITALS
HEIGHT: 78 IN | HEART RATE: 66 BPM | SYSTOLIC BLOOD PRESSURE: 103 MMHG | BODY MASS INDEX: 32.97 KG/M2 | WEIGHT: 285 LBS | TEMPERATURE: 97.7 F | OXYGEN SATURATION: 93 % | RESPIRATION RATE: 16 BRPM | DIASTOLIC BLOOD PRESSURE: 51 MMHG

## 2021-10-12 DIAGNOSIS — R56.9 SEIZURE (HCC): ICD-10-CM

## 2021-10-12 LAB — VALPROATE SERPL-MCNC: 48.2 UG/ML (ref 50–100)

## 2021-10-12 PROCEDURE — A9270 NON-COVERED ITEM OR SERVICE: HCPCS | Performed by: EMERGENCY MEDICINE

## 2021-10-12 PROCEDURE — 80164 ASSAY DIPROPYLACETIC ACD TOT: CPT

## 2021-10-12 PROCEDURE — 70450 CT HEAD/BRAIN W/O DYE: CPT

## 2021-10-12 PROCEDURE — 700102 HCHG RX REV CODE 250 W/ 637 OVERRIDE(OP): Performed by: EMERGENCY MEDICINE

## 2021-10-12 PROCEDURE — 99284 EMERGENCY DEPT VISIT MOD MDM: CPT

## 2021-10-12 RX ORDER — DIVALPROEX SODIUM 500 MG/1
500 TABLET, DELAYED RELEASE ORAL ONCE
Status: COMPLETED | OUTPATIENT
Start: 2021-10-12 | End: 2021-10-12

## 2021-10-12 RX ADMIN — DIVALPROEX SODIUM 500 MG: 500 TABLET, DELAYED RELEASE ORAL at 22:57

## 2021-10-12 ASSESSMENT — FIBROSIS 4 INDEX: FIB4 SCORE: 0.5

## 2021-10-12 ASSESSMENT — PAIN DESCRIPTION - PAIN TYPE: TYPE: ACUTE PAIN

## 2021-10-13 NOTE — ED NOTES
Discharge teaching with paperwork regarding epilepsy and follow up care provided to pt. Pt verbalized understanding of teaching and all questions answered. Pt is A&Ox4 with stable vital signs, stable physical assessment, and PIV removed upon discharge. Pt displayed no seizure activity or acute distress at any pont during visit today. Pt ambulatory out of ED with steady gait with all personal belongings.

## 2021-10-13 NOTE — ED NOTES
Pt from the lobby to yellow 60 via wheelchair. Pt changed into gown and placed on continuous cardiac, BP and O2 monitors. Initial assessment complete. Seizure [recautions put in place. ERP to see.

## 2021-10-13 NOTE — ED TRIAGE NOTES
Pt to ED with complaints of seizure today. He thinks he has had 3 seizure today. He take Depakote daily 500 mg AM and 1500 mg PM. He reports he has not missed any doses of his medications. He does think he hit is head during one of his seizure. No obvious external injury. He is awake and alert on arrival to ED triage.     Pt educated on ED process and asked to wait in lobby. Patient educated on importance of alerting staff to new or worsening symptoms or concerns.

## 2021-10-13 NOTE — ED PROVIDER NOTES
"ED Provider Note    CHIEF COMPLAINT  Chief Complaint   Patient presents with   • Seizure   • Headache       HPI  Norm Prabhakar is a 41 y.o. male here for evaluation of seizure. Patient has a history of seizures, and takes Depakote for the same. States he had a seizure earlier today, may be 2, and 1 in which he fell backwards and struck his head. He states \"I blacked out.\" He complains of some headache, but no vomiting. He has no fever chills. Patient is no chest pain or shortness of breath. He lives in a group home. Patient has no other issues at this time. Patient states he is compliant with his seizure medicine.    ROS;  Please see HPI  O/W negative     PAST MEDICAL HISTORY   has a past medical history of Anxiety, ASTHMA, Bipolar 1 disorder ([facility]), Depression, Diabetes ([facility]), Fall, Glaucoma, Glaucoma (), Hypothyroidism, Indigestion, Mental disorder, Murmur, Pneumonia, Psychiatric problem (), S/P thyroidectomy, Seizure ([facility]) (), Seizure disorder ([facility]), and Unspecified disorder of thyroid.    SOCIAL HISTORY  Social History     Tobacco Use   • Smoking status: Former Smoker     Packs/day: 0.25     Types: Cigarettes, Cigars     Quit date: 2020     Years since quittin.5   • Smokeless tobacco: Never Used   • Tobacco comment: 3 cigarettes a day   Vaping Use   • Vaping Use: Former   Substance and Sexual Activity   • Alcohol use: Not Currently   • Drug use: Yes     Types: Inhaled     Comment: marijuana   • Sexual activity: Not on file       SURGICAL HISTORY   has a past surgical history that includes eye surgery; thyroid lobectomy; and other.    CURRENT MEDICATIONS  Home Medications    **Home medications have not yet been reviewed for this encounter**         ALLERGIES  Allergies   Allergen Reactions   • Geodon [Ziprasidone Hcl] Anaphylaxis     Anaphylaxis per patient   • Abilify      \"Feeling tired, like I don't even know whats going on around me\"   • Fish      Pt reports fish causes him to be sick to " "his stomach  Not listed on MAR noted 2/3/2021         REVIEW OF SYSTEMS  See HPI for further details. Review of systems as above, otherwise all other systems are negative.     PHYSICAL EXAM  VITAL SIGNS: /73   Pulse 88   Temp 36.2 °C (97.2 °F)   Resp 17   Ht 1.981 m (6' 6\")   Wt (!) 129 kg (285 lb)   SpO2 92%   BMI 32.94 kg/m²     Constitutional: Well developed, well nourished. No acute distress.  HEENT: Normocephalic, atraumatic. MMM  Neck: Supple, Full range of motion   Chest/Pulmonary:  No respiratory distress.  Equal expansion   Musculoskeletal: No deformity, no edema, neurovascular intact.   Neuro: Clear speech, appropriate, cooperative, cranial nerves II-XII grossly intact.  Psych: Normal mood and affect      CT-HEAD W/O   Final Result         NO ACUTE ABNORMALITIES ARE NOTED ON CT SCAN OF THE HEAD.      Decreased attenuation in the cerebral white matter likely indicates microvascular ischemic disease or leukoencephalopathy..          Results for orders placed or performed during the hospital encounter of 10/12/21   VALPROIC ACID   Result Value Ref Range    Valproic Acid 48.2 (L) 50.0 - 100.0 ug/mL               PROCEDURES     MEDICAL RECORD  I have reviewed patient's medical record and pertinent results are listed.    COURSE & MEDICAL DECISION MAKING  I have reviewed any medical record information, laboratory studies and radiographic results as noted above.    10:39 PM  The patient has no new focal deficits. He has a history of seizures, but a negative CT scan for any acute findings tonight. He is also mildly subtherapeutic on his valproic acid, and we will give him this here. Patient will follow up with neurology.    I you have had any blood pressure issues while here in the emergency department, please see your doctor for a further evaluation or work up.    Differential diagnoses include but not limited to: Subarachnoid, subdural, epidural    This patient presents with subtherapeutic Depakote, " seizure disorder.  At this time, I have counseled the patient/family regarding their medications, pain control, and follow up.  They will continue their medications, if any, as prescribed.  They will return immediately for any worsening symptoms and/or any other medical concerns.  They will see their doctor, or contact the doctor provided, in 1-2 days for follow up.       FINAL IMPRESSION  Seizure disorder  Subtherapeutic Depakote        Electronically signed by: Candido Villar D.O., 10/12/2021 10:29 PM

## 2021-10-13 NOTE — ED NOTES
Rounded on pt. Pt resting in bed, respirations even and unlabored, VSS, NAD at this time, call light in reach.

## 2021-12-10 ENCOUNTER — HOSPITAL ENCOUNTER (EMERGENCY)
Facility: MEDICAL CENTER | Age: 41
End: 2021-12-11
Attending: EMERGENCY MEDICINE
Payer: MEDICAID

## 2021-12-10 VITALS
RESPIRATION RATE: 18 BRPM | BODY MASS INDEX: 33.19 KG/M2 | SYSTOLIC BLOOD PRESSURE: 109 MMHG | TEMPERATURE: 97.3 F | DIASTOLIC BLOOD PRESSURE: 59 MMHG | OXYGEN SATURATION: 97 % | WEIGHT: 286.82 LBS | HEART RATE: 91 BPM | HEIGHT: 78 IN

## 2021-12-10 DIAGNOSIS — R73.9 HYPERGLYCEMIA: ICD-10-CM

## 2021-12-10 LAB
GLUCOSE BLD-MCNC: 274 MG/DL (ref 65–99)
GLUCOSE BLD-MCNC: 311 MG/DL (ref 65–99)

## 2021-12-10 PROCEDURE — 99282 EMERGENCY DEPT VISIT SF MDM: CPT

## 2021-12-10 PROCEDURE — 82962 GLUCOSE BLOOD TEST: CPT | Mod: 91

## 2021-12-10 ASSESSMENT — FIBROSIS 4 INDEX: FIB4 SCORE: 0.5

## 2021-12-11 NOTE — ED PROVIDER NOTES
ED Provider Note    CHIEF COMPLAINT  Chief Complaint   Patient presents with   • High Blood Sugar     BS measured at 1915 by pt.        HPI  Norm Prabhakar is a 41 y.o. male who presents with high blood sugar.  The patient states that he went to an event this evening and got home and checked his blood sugar and is in the 300s.  He states he is asymptomatic.  Has not had any nausea nor diarrhea.  He states he has been compliant with his medication.  He presents in an asymptomatic state.    REVIEW OF SYSTEMS  See HPI for further details. All other systems are negative.     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   • Anxiety     BIPOLAR   • ASTHMA    • Bipolar 1 disorder ([facility])    • Depression    • Diabetes ([facility])     Type II Diabetes   • Fall     passed out 2 wks ago   • Glaucoma    • Hypothyroidism    • Indigestion     once in a while   • Mental disorder     learning disabilities; speech impairment; developmental delays   • Murmur     since birth   • Pneumonia     remote   • S/P thyroidectomy    • Seizure disorder ([facility])    • Unspecified disorder of thyroid        FAMILY HISTORY  [unfilled]    SOCIAL HISTORY  Social History     Socioeconomic History   • Marital status: Single     Spouse name: Not on file   • Number of children: Not on file   • Years of education: Not on file   • Highest education level: Not on file   Occupational History   • Not on file   Tobacco Use   • Smoking status: Former Smoker     Packs/day: 0.25     Types: Cigarettes, Cigars     Quit date: 2020     Years since quittin.6   • Smokeless tobacco: Never Used   • Tobacco comment: 3 cigarettes a day   Vaping Use   • Vaping Use: Former   Substance and Sexual Activity   • Alcohol use: Not Currently   • Drug use: Yes     Types: Inhaled     Comment: marijuana   • Sexual activity: Not on file   Other Topics Concern   •  Not on file   Social History Narrative   • Not on file     Social Determinants of Health     Financial Resource Strain: Low Risk    • Difficulty of Paying Living Expenses: Not very hard   Food Insecurity: Food Insecurity Present   • Worried About Running Out of Food in the Last Year: Sometimes true   • Ran Out of Food in the Last Year: Sometimes true   Transportation Needs: No Transportation Needs   • Lack of Transportation (Medical): No   • Lack of Transportation (Non-Medical): No   Physical Activity:    • Days of Exercise per Week: Not on file   • Minutes of Exercise per Session: Not on file   Stress:    • Feeling of Stress : Not on file   Social Connections:    • Frequency of Communication with Friends and Family: Not on file   • Frequency of Social Gatherings with Friends and Family: Not on file   • Attends Baptism Services: Not on file   • Active Member of Clubs or Organizations: Not on file   • Attends Club or Organization Meetings: Not on file   • Marital Status: Not on file   Intimate Partner Violence:    • Fear of Current or Ex-Partner: Not on file   • Emotionally Abused: Not on file   • Physically Abused: Not on file   • Sexually Abused: Not on file   Housing Stability:    • Unable to Pay for Housing in the Last Year: Not on file   • Number of Places Lived in the Last Year: Not on file   • Unstable Housing in the Last Year: Not on file       SURGICAL HISTORY  Past Surgical History:   Procedure Laterality Date   • EYE SURGERY     • OTHER      Hernia Repair when he was 10 yrs old   • THYROID LOBECTOMY         CURRENT MEDICATIONS  Home Medications     Reviewed by Leslie Nunez R.N. (Registered Nurse) on 12/10/21 at 2122  Med List Status: Not Addressed   Medication Last Dose Status   albuterol 108 (90 Base) MCG/ACT Aero Soln inhalation aerosol  Active   atorvastatin (LIPITOR) 10 MG Tab  Active   budesonide-formoterol (SYMBICORT) 160-4.5 MCG/ACT Aerosol  Active   Cholecalciferol (VITAMIN D3) 2000 UNIT Cap   "Active   divalproex (DEPAKOTE) 500 MG Tablet Delayed Response  Active   docusate sodium (COLACE) 100 MG Cap  Active   Empagliflozin (JARDIANCE) 25 MG Tab  Active   Fenofibrate 134 MG Cap  Active   insulin glargine (BASAGLAR KWIKPEN) 100 UNIT/ML injection PEN  Active   levothyroxine (SYNTHROID) 200 MCG Tab  Active   levothyroxine (SYNTHROID) 25 MCG Tab  Active   lisinopril (PRINIVIL) 10 MG Tab  Active   lurasidone (LATUDA) 20 MG Tab  Active   metformin (GLUCOPHAGE) 1000 MG tablet  Active   montelukast (SINGULAIR) 10 MG Tab  Active   Omega-3 Fatty Acids (FISH OIL) 1000 MG Cap capsule  Active   prazosin (MINIPRESS) 2 MG Cap  Active   sertraline (ZOLOFT) 100 MG Tab  Active   SITagliptin (JANUVIA) 100 MG Tab  Active                ALLERGIES  Allergies   Allergen Reactions   • Geodon [Ziprasidone Hcl] Anaphylaxis     Anaphylaxis per patient   • Abilify      \"Feeling tired, like I don't even know whats going on around me\"   • Fish      Pt reports fish causes him to be sick to his stomach  Not listed on MAR noted 2/3/2021         PHYSICAL EXAM  VITAL SIGNS: /72   Pulse 100   Temp 35.8 °C (96.5 °F) (Axillary)   Resp 19   Ht 1.981 m (6' 6\")   Wt (!) 130 kg (286 lb 13.1 oz)   SpO2 95%   BMI 33.15 kg/m²       Constitutional: Well developed, Well nourished, No acute distress, Non-toxic appearance.   HENT: Normocephalic, Atraumatic, Bilateral external ears normal, Oropharynx moist, No oral exudates, Nose normal.   Eyes: PERRLA, EOMI, Conjunctiva normal, No discharge.   Neck: Normal range of motion, No tenderness, Supple, No stridor.   Lymphatic: No lymphadenopathy noted.   Cardiovascular: Normal heart rate, Normal rhythm, No murmurs, No rubs, No gallops.   Thorax & Lungs: Normal breath sounds, No respiratory distress, No wheezing, No chest tenderness.   Abdomen: Bowel sounds normal, Soft, No tenderness, No masses, No pulsatile masses.   Skin: Warm, Dry, No erythema, No rash.   Back: No tenderness, No CVA tenderness.  "   Extremities: Intact distal pulses, No edema, No tenderness, No cyanosis, No clubbing.   Neurologic: Alert & oriented x 3, Normal motor function, Normal sensory function, No focal deficits noted.   Psychiatric: Affect normal, Judgment normal, Mood normal.     COURSE & MEDICAL DECISION MAKING  Pertinent Labs & Imaging studies reviewed. (See chart for details)  This a 41-year-old male who presents the emergency department with asymptomatic hyperglycemia.  We did recheck his blood sugar and is trending down.  Does not have any nausea or vomiting.  Therefore we will continue his current medication regimen and stay well-hydrated.  If he develops vomiting or further difficulty with his blood sugar continue to be in the 300s he will return.  Otherwise he is instructed to follow-up with his primary care doctor in 1 week for further blood sugar management.    FINAL IMPRESSION  1.  Hyperglycemia     Disposition  The patient will be discharged in stable condition         Electronically signed by: Josh Randhawa M.D., 12/10/2021 10:59 PM

## 2021-12-11 NOTE — ED TRIAGE NOTES
".  Chief Complaint   Patient presents with   • High Blood Sugar     BS measured at 1915 by pt.      Pt ambulatory to triage for above complaint. Pt states he takes Metformin and Lantus for DMT2. States BS prioe to arrival was 350. Pt denies any symptoms at this time, he would just like to \"make sure everything is ok.\" Blood glucose in triage 311.     Pt is alert/oriented and follows commands. Pt speaking in full sentences and responds appropriately to questions. No acute distress noted in triage and respirations are even and unlabored.     Pt placed in lobby and educated on triage process. Pt encouraged to alert staff for any changes in condition.    "

## 2021-12-24 ENCOUNTER — APPOINTMENT (OUTPATIENT)
Dept: RADIOLOGY | Facility: MEDICAL CENTER | Age: 41
End: 2021-12-24
Attending: EMERGENCY MEDICINE
Payer: MEDICAID

## 2021-12-24 ENCOUNTER — HOSPITAL ENCOUNTER (EMERGENCY)
Facility: MEDICAL CENTER | Age: 41
End: 2021-12-24
Attending: EMERGENCY MEDICINE
Payer: MEDICAID

## 2021-12-24 VITALS
DIASTOLIC BLOOD PRESSURE: 72 MMHG | WEIGHT: 287.04 LBS | TEMPERATURE: 97.6 F | SYSTOLIC BLOOD PRESSURE: 112 MMHG | HEART RATE: 78 BPM | RESPIRATION RATE: 16 BRPM | OXYGEN SATURATION: 94 % | HEIGHT: 78 IN | BODY MASS INDEX: 33.21 KG/M2

## 2021-12-24 DIAGNOSIS — M25.511 ACUTE PAIN OF RIGHT SHOULDER: ICD-10-CM

## 2021-12-24 PROCEDURE — 73030 X-RAY EXAM OF SHOULDER: CPT | Mod: RT

## 2021-12-24 PROCEDURE — 99283 EMERGENCY DEPT VISIT LOW MDM: CPT

## 2021-12-24 ASSESSMENT — FIBROSIS 4 INDEX: FIB4 SCORE: 0.5

## 2021-12-24 NOTE — ED TRIAGE NOTES
"Chief Complaint   Patient presents with   • Shoulder Pain     Pt reports having right shoulder pain for past month.  Pt seen by PCP with PT ordered but pt reports unable to participate due to \"too much pain\".       Pt to triage from lobby with above complaint.      /83   Pulse 85   Temp 36.3 °C (97.4 °F) (Temporal)   Resp 16   Ht 1.981 m (6' 6\")   Wt (!) 130 kg (287 lb 0.6 oz)   SpO2 97%   BMI 33.17 kg/m²     "

## 2021-12-25 NOTE — DISCHARGE INSTRUCTIONS
Keep your shoulder at rest and protected.  Use the sling if you find it to provide comfort.  Call the orthopedic office first thing Monday morning and arrange office recheck during the week or follow-up with your primary care doctor.  You should continue Tylenol and ibuprofen if needed for discomfort.

## 2021-12-25 NOTE — ED PROVIDER NOTES
"ED Provider Note    CHIEF COMPLAINT  Chief Complaint   Patient presents with   • Shoulder Pain     Pt reports having right shoulder pain for past month.  Pt seen by PCP with PT ordered but pt reports unable to participate due to \"too much pain\".         HPI  Norm Prabhakar is a 41 y.o. male who presents to the emergency department complaining of pain over the right lateral shoulder.  The patient says that he recently started a workout routine and this included weightlifting and he says that he \"overdid it\" on December 1 he strained his right shoulder while lifting weights and it continues to bother him.  It hurts to lift up the arm or move the shoulder around.  He has been seen by his primary care doctor and says that he was told that he may benefit from physical therapy but he does not feel that physical therapy would be helpful so he is come to the emergency department.    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   • Hypothyroidism    • Indigestion     once in a while   • Mental disorder     learning disabilities; speech impairment; developmental delays   • Murmur     since birth   • Pneumonia     remote   • Psychiatric problem 2002    PTSD   • S/P thyroidectomy    • Seizure ([facility]) 2010   • Seizure disorder ([facility])    • Unspecified disorder of thyroid        FAMILY HISTORY  Family History   Problem Relation Age of Onset   • Hypertension Mother    • Heart Disease Mother    • Lung Disease Mother    • Stroke Maternal Grandmother        SOCIAL HISTORY  Social History     Socioeconomic History   • Marital status: Single     Spouse name: Not on file   • Number of children: Not on file   • Years of education: " Not on file   • Highest education level: Not on file   Occupational History   • Not on file   Tobacco Use   • Smoking status: Former Smoker     Packs/day: 0.25     Types: Cigarettes, Cigars     Quit date: 2020     Years since quittin.7   • Smokeless tobacco: Never Used   • Tobacco comment: 3 cigarettes a day   Vaping Use   • Vaping Use: Former   Substance and Sexual Activity   • Alcohol use: Not Currently   • Drug use: Yes     Types: Inhaled     Comment: marijuana   • Sexual activity: Not on file   Other Topics Concern   • Not on file   Social History Narrative   • Not on file     Social Determinants of Health     Financial Resource Strain: Low Risk    • Difficulty of Paying Living Expenses: Not very hard   Food Insecurity: Food Insecurity Present   • Worried About Running Out of Food in the Last Year: Sometimes true   • Ran Out of Food in the Last Year: Sometimes true   Transportation Needs: No Transportation Needs   • Lack of Transportation (Medical): No   • Lack of Transportation (Non-Medical): No   Physical Activity:    • Days of Exercise per Week: Not on file   • Minutes of Exercise per Session: Not on file   Stress:    • Feeling of Stress : Not on file   Social Connections:    • Frequency of Communication with Friends and Family: Not on file   • Frequency of Social Gatherings with Friends and Family: Not on file   • Attends Caodaism Services: Not on file   • Active Member of Clubs or Organizations: Not on file   • Attends Club or Organization Meetings: Not on file   • Marital Status: Not on file   Intimate Partner Violence:    • Fear of Current or Ex-Partner: Not on file   • Emotionally Abused: Not on file   • Physically Abused: Not on file   • Sexually Abused: Not on file   Housing Stability:    • Unable to Pay for Housing in the Last Year: Not on file   • Number of Places Lived in the Last Year: Not on file   • Unstable Housing in the Last Year: Not on file       SURGICAL HISTORY  Past Surgical  "History:   Procedure Laterality Date   • EYE SURGERY     • OTHER      Hernia Repair when he was 10 yrs old   • THYROID LOBECTOMY         CURRENT MEDICATIONS  Home Medications     Reviewed by Seda Harris R.N. (Registered Nurse) on 12/24/21 at 1413  Med List Status: Partial   Medication Last Dose Status   albuterol 108 (90 Base) MCG/ACT Aero Soln inhalation aerosol  Active   atorvastatin (LIPITOR) 10 MG Tab  Active   budesonide-formoterol (SYMBICORT) 160-4.5 MCG/ACT Aerosol  Active   Cholecalciferol (VITAMIN D3) 2000 UNIT Cap  Active   divalproex (DEPAKOTE) 500 MG Tablet Delayed Response  Active   docusate sodium (COLACE) 100 MG Cap  Active   Empagliflozin (JARDIANCE) 25 MG Tab  Active   Fenofibrate 134 MG Cap  Active   insulin glargine (BASAGLAR KWIKPEN) 100 UNIT/ML injection PEN  Active   levothyroxine (SYNTHROID) 200 MCG Tab  Active   levothyroxine (SYNTHROID) 25 MCG Tab  Active   lisinopril (PRINIVIL) 10 MG Tab  Active   lurasidone (LATUDA) 20 MG Tab  Active   metformin (GLUCOPHAGE) 1000 MG tablet  Active   montelukast (SINGULAIR) 10 MG Tab  Active   Omega-3 Fatty Acids (FISH OIL) 1000 MG Cap capsule  Active   prazosin (MINIPRESS) 2 MG Cap  Active   sertraline (ZOLOFT) 100 MG Tab  Active   SITagliptin (JANUVIA) 100 MG Tab  Active                ALLERGIES  Allergies   Allergen Reactions   • Geodon [Ziprasidone Hcl] Anaphylaxis     Anaphylaxis per patient   • Abilify      \"Feeling tired, like I don't even know whats going on around me\"   • Fish      Pt reports fish causes him to be sick to his stomach  Not listed on MAR noted 2/3/2021         PHYSICAL EXAM  VITAL SIGNS: /72   Pulse 78   Temp 36.4 °C (97.6 °F) (Temporal)   Resp 16   Ht 1.981 m (6' 6\")   Wt (!) 130 kg (287 lb 0.6 oz)   SpO2 94%   BMI 33.17 kg/m²    Oxygen saturation is interpreted as adequate  Constitutional: Awake lucid verbal he does not appear toxic or distressed  HENT: No sign of acute trauma to the head  Neck: Trachea midline no JVD " no C-spine tenderness  Musculoskeletal: Visually the right shoulder looks normal there is no deformity there is no redness or swelling no sulcus sign the patient has adequate range of motion he indicates the most uncomfortable area is over the lateral portion of the deltoid especially if he lifts up his arm.  The rest of the arm is warm and well-perfused there is no edema    Radiology  DX-SHOULDER 2+ RIGHT   Final Result      1.  Normal right shoulder series.          MEDICAL DECISION MAKING and DISPOSITION  In the emergency department I reviewed the x-ray and physical exam findings with the patient.  I have advised him that he likely strained a muscle or injured tendon or ligament within the shoulder.  I have provided the patient with a shoulder sling and I recommended that he use Tylenol and Motrin and ice packs on the area of discomfort.  I have recommended that the patient call the orthopedic clinic first thing Monday morning and arrange office recheck during the week    IMPRESSION  1.  Right shoulder injury         Electronically signed by: Santosh Tucker M.D., 12/24/2021 8:56 PM

## 2022-01-20 ENCOUNTER — OFFICE VISIT (OUTPATIENT)
Dept: NEUROLOGY | Facility: MEDICAL CENTER | Age: 42
End: 2022-01-20
Attending: PSYCHIATRY & NEUROLOGY
Payer: MEDICAID

## 2022-01-20 VITALS
OXYGEN SATURATION: 97 % | BODY MASS INDEX: 32.75 KG/M2 | WEIGHT: 283.07 LBS | HEART RATE: 74 BPM | DIASTOLIC BLOOD PRESSURE: 72 MMHG | SYSTOLIC BLOOD PRESSURE: 126 MMHG | HEIGHT: 78 IN

## 2022-01-20 DIAGNOSIS — G43.109 MIGRAINE WITH AURA AND WITHOUT STATUS MIGRAINOSUS, NOT INTRACTABLE: Primary | ICD-10-CM

## 2022-01-20 PROBLEM — E89.0 HX OF THYROIDECTOMY: Status: ACTIVE | Noted: 2019-08-02

## 2022-01-20 PROBLEM — E11.9 TYPE 2 DIABETES MELLITUS (HCC): Status: ACTIVE | Noted: 2019-01-03

## 2022-01-20 PROBLEM — Z98.890 HX OF THYROIDECTOMY: Status: ACTIVE | Noted: 2019-08-02

## 2022-01-20 PROBLEM — Z90.89 HX OF THYROIDECTOMY: Status: ACTIVE | Noted: 2019-08-02

## 2022-01-20 PROCEDURE — 99212 OFFICE O/P EST SF 10 MIN: CPT | Performed by: PSYCHIATRY & NEUROLOGY

## 2022-01-20 PROCEDURE — 99214 OFFICE O/P EST MOD 30 MIN: CPT | Performed by: PSYCHIATRY & NEUROLOGY

## 2022-01-20 RX ORDER — SUMATRIPTAN 100 MG/1
TABLET, FILM COATED ORAL
Qty: 9 TABLET | Refills: 5 | Status: SHIPPED
Start: 2022-01-20 | End: 2022-02-26

## 2022-01-20 ASSESSMENT — FIBROSIS 4 INDEX: FIB4 SCORE: 0.5

## 2022-01-20 NOTE — PROGRESS NOTES
"[facility] NEUROLOGY  GENERAL NEUROLOGY  NEW PATIENT VISIT    Referral source: Augustus Carroll DO    CC: \"acute non-intractable headache...\"    HISTORY OF ILLNESS:  Norm Prabhakar is a 41 y.o. man with a history most notable for obesity, asthma, bradycardia, syncope, hypothyroidism, HTN, T2DM, \"seizure,\" conversion disorder, PTSD, and depression.  Today, he was unaccompanied, and he provided the following history:    The following is a summary of headache symptoms, presented in my standard format:    Family History: mother, maternal aunt, and maternal grandmother  Age at onset: childhood  Location: bi-frontal  Radiation: back of the head  Frequency: 3 times/month  Duration: 3-4 days  Headache Days/Month: 6-7/30  Quality: \"like being hit in the head with a sledgehammer\"  Intensity: 10/10  Aura: right hemiparesis, blurry vision  Photophobia/Phonophobia/Nausea/Vomiting: yes/yes/yes/sometimes  Provoked by Physical Activity?:   Triggers: stress  Associated Symptoms: none  Autonomic Signs (such as ptosis, miosis, conjunctival injection, rhinorrhea, increased lacrimation): tearing, sweatiness on the left  Head Trauma: played football in [employer], he had ~6 concussions  Association with Menses: n/a  ED Visits: yes  Hospitalizations:   Missed Work Days: used to work at [employer]  Sleep: 4-5 hours/night  Caffeine Intake: at least 5 cups of coffee/day  Hydration: keeps well-hydrated  Nutrition: skips lunch  Exercise:   Analgesic Overuse:     Current Medication Regimen:  - acetaminophen: ineffective    Medications Tried: Response  Preventive:  -     Abortive:  - Excedrin: effective, doesn't have access to this    Medications Not Tried:  -     Norm lives in a group home on [facility].    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   • Hypothyroidism    • Indigestion     once in a while   • Mental disorder     learning disabilities; speech impairment; developmental delays   • Murmur     since birth   • Pneumonia     remote   • Psychiatric problem 2002    PTSD   • S/P thyroidectomy    • Seizure (HCC) 2010   • Seizure disorder (HCC)    • Unspecified disorder of thyroid      Past Surgical History:   Procedure Laterality Date   • EYE SURGERY     • OTHER      Hernia Repair when he was 10 yrs old   • THYROID LOBECTOMY       MEDICATIONS:  Current Outpatient Medications   Medication Sig   • sumatriptan (IMITREX) 100 MG tablet Take 100 mg at the onset of aura/HA; may re-dose x1 after 2 hrs if HA persists; MDD: 200 mg; do not use >2 days/week.   • metformin (GLUCOPHAGE) 1000 MG tablet Take 1,000 mg by mouth 2 times a day with meals.   • docusate sodium (COLACE) 100 MG Cap Take 100 mg by mouth every day.   • levothyroxine (SYNTHROID) 200 MCG Tab Take 200 mcg by mouth every morning on an empty stomach. Take in addition to 25 mcg tablet for daily dose of 225 mcg   • divalproex (DEPAKOTE) 500 MG Tablet Delayed Response Take 500-1,500 mg by mouth 2 times a day. 1 tablet = 500 mg every morning  3 tablets = 1500 mg every evening   • SITagliptin (JANUVIA) 100 MG Tab Take 100 mg by mouth every day.   • Fenofibrate 134 MG Cap Take 134 mg by mouth every day.   • albuterol 108 (90 Base) MCG/ACT Aero Soln inhalation aerosol Inhale 2 Puffs every 6 hours as needed for Shortness of Breath.   • sertraline (ZOLOFT) 100 MG Tab Take 100 mg by mouth every day.   • levothyroxine (SYNTHROID) 25 MCG Tab Take 200 mcg by mouth every morning on an empty stomach. Take in addition to 200 mcg tablet for daily dose of 225 mcg   • Empagliflozin (JARDIANCE) 25 MG Tab Take 25 mg by mouth every day.   • budesonide-formoterol (SYMBICORT) 160-4.5 MCG/ACT Aerosol Inhale 2 Puffs 2 Times a Day.   • lurasidone (LATUDA) 20 MG Tab Take 20 mg by  mouth every evening.   • atorvastatin (LIPITOR) 10 MG Tab Take 10 mg by mouth every evening.   • lisinopril (PRINIVIL) 10 MG Tab Take 10 mg by mouth every day.   • Cholecalciferol (VITAMIN D3) 2000 UNIT Cap Take 4,000 Units by mouth every evening.   • prazosin (MINIPRESS) 2 MG Cap Take 4 mg by mouth every evening. 2 caps = 4 mg   • montelukast (SINGULAIR) 10 MG Tab Take 10 mg by mouth every evening.   • Omega-3 Fatty Acids (FISH OIL) 1000 MG Cap capsule Take 1,000 mg by mouth 2 Times a Day.   • insulin glargine (BASAGLAR KWIKPEN) 100 UNIT/ML injection PEN Inject 30 Units under the skin 2 times a day.     SOCIAL HISTORY:  Social History     Tobacco Use   • Smoking status: Former Smoker     Packs/day: 0.25     Types: Cigarettes, Cigars     Quit date: 2020     Years since quittin.8   • Smokeless tobacco: Never Used   • Tobacco comment: 3 cigarettes a day   Substance Use Topics   • Alcohol use: Not Currently     Social History     Social History Narrative   • Not on file     FAMILY HISTORY:  Family History   Problem Relation Age of Onset   • Hypertension Mother    • Heart Disease Mother    • Lung Disease Mother    • Stroke Maternal Grandmother      REVIEW OF SYSTEMS:  A ROS was completed.  Pertinent positives and negatives were included in the HPI, above.  All other systems were reviewed and are negative.    PHYSICAL EXAM:  General/Medical:  - NAD  - hair, skin, nails, and joints were normal    Neuro:  MENTAL STATUS: awake and alert; no deficits of speech or language; oriented to person, place, and time; affect was appropriate to situation    CRANIAL NERVES:    II: acuity: J2/NLP, fields: intact to confrontation on the right, pupils: 3/X to 2/X, discs: sharp on the right    III/IV/VI: versions: intact without nystagmus    V: facial sensation: symmetric to light touch    VII: facial expression: symmetric    VIII: hearing: intact to finger rub    IX/X: palate: elevates symmetrically    XI: shoulder shrug:  "symmetric    XII: tongue: midline    MOTOR:  - bulk: normal throughout  - tone: normal throughout  Upper Extremity Strength  (R/L)    NT/NT   Elbow flexion 4 (pain)/5   Elbow extension 4 (pain)/5   Shoulder abduction 4 (pain)/5     Lower Extremity Strength  (R/L)   Hip flexion 5/5   Knee extension 5/5   Knee flexion 5/5   Ankle plantarflexion NT   Ankle dorsiflexion NT     - pronator drift: NT  - abnormal movements: none    SENSATION:  - light touch: grossly intact throughout  - vibration (R/L, seconds): NT/NT at the great toes  - pinprick: NT  - proprioception: NT  - Romberg: NT    COORDINATION:  - finger to nose: NT  - finger tapping: mildly slow, bilaterally    REFLEXES:  Reflex Right Left   BR 2+ 2+   Biceps 2+ 2+   Triceps 2+ 2+   Patellae 2+ 2+   Achilles NT NT   Toes NT NT     GAIT:  - poor arm swing, bilaterally  - heel-raised/toe-raised gait: difficulty/difficulty  - tandem gait: intact    REVIEW OF IMAGING STUDIES: I reviewed the following studies:  CT Head:  Date: 10/12/2021  W/o and w/ contrast?: without  Indication: \"seizure, new-onset, no history of trauma\"  Comparison: 8/30/2021  Impression:  \"NO ACUTE ABNORMALITIES ARE NOTED ON CT SCAN OF THE HEAD.  Decreased attenuation in the cerebral white matter likely indicates microvascular ischemic disease or leukoencephalopathy.\"    REVIEW OF LABORATORY STUDIES:  No recent, pertinent data available for review.    ASSESSMENT:  Norm Prabhakar is a 41 y.o. man with migraine with aura as well as a history most notable for obesity, asthma, bradycardia, syncope, hypothyroidism, HTN, T2DM, \"seizure,\" conversion disorder, PTSD, and depression.  Plans/recommenadtions as follows:    PLAN:  Migraine w/ Aura:  Prevention:  - get 7-9 hours of sleep per night; can try supplementing melatonin 2-10 mg, 2-3 hours before bedtime  - drink plenty of fluids (urine should be nearly clear)  - avoid excessive caffeine intake (no more than 2 servings per day and nothing in the " "afternoon)  - eat regular meals (don't skip meals)  - get moderate exercise (even just a 20 minute walk daily)    Rescue:  - sumatriptan 100 mg: take this at the onset of aura or headache pain; may re-dose x1 after 2 hours if headache persists; do not use more than 2 days/week  - do not use analgesics (e.g., ibuprofen, acetaminophen) more than 2 days per week in order to avoid analgesic rebound headaches    - keep a headache log    Follow-Up:  - Return in about 4 months (around 5/20/2022).    Signed: Brandon Delatorre M.D.    BILLING DOCUMENTATION:   I spent 31 minutes reviewing the medical record, interviewing and examining the patient, discussing my impression (see \"assessment\" above), and coordinating care.  "

## 2022-02-26 ENCOUNTER — HOSPITAL ENCOUNTER (OUTPATIENT)
Facility: MEDICAL CENTER | Age: 42
End: 2022-03-20
Attending: EMERGENCY MEDICINE | Admitting: STUDENT IN AN ORGANIZED HEALTH CARE EDUCATION/TRAINING PROGRAM
Payer: MEDICAID

## 2022-02-26 ENCOUNTER — APPOINTMENT (OUTPATIENT)
Dept: RADIOLOGY | Facility: MEDICAL CENTER | Age: 42
End: 2022-02-26
Attending: EMERGENCY MEDICINE
Payer: MEDICAID

## 2022-02-26 DIAGNOSIS — F80.81 STUTTERING: ICD-10-CM

## 2022-02-26 DIAGNOSIS — I10 PRIMARY HYPERTENSION: ICD-10-CM

## 2022-02-26 DIAGNOSIS — R53.1 RIGHT SIDED WEAKNESS: ICD-10-CM

## 2022-02-26 DIAGNOSIS — R53.1 ACUTE RIGHT-SIDED WEAKNESS: ICD-10-CM

## 2022-02-26 DIAGNOSIS — I63.00 CEREBROVASCULAR ACCIDENT (CVA) DUE TO THROMBOSIS OF PRECEREBRAL ARTERY (HCC): ICD-10-CM

## 2022-02-26 DIAGNOSIS — F41.9 ANXIETY AND DEPRESSION: ICD-10-CM

## 2022-02-26 DIAGNOSIS — F44.9 CONVERSION DISORDER: ICD-10-CM

## 2022-02-26 DIAGNOSIS — E89.0 HX OF THYROIDECTOMY: ICD-10-CM

## 2022-02-26 DIAGNOSIS — J45.909 UNCOMPLICATED ASTHMA, UNSPECIFIED ASTHMA SEVERITY, UNSPECIFIED WHETHER PERSISTENT: ICD-10-CM

## 2022-02-26 DIAGNOSIS — J44.9 CHRONIC OBSTRUCTIVE PULMONARY DISEASE, UNSPECIFIED COPD TYPE (HCC): ICD-10-CM

## 2022-02-26 DIAGNOSIS — E11.9 TYPE 2 DIABETES MELLITUS WITHOUT COMPLICATION, WITHOUT LONG-TERM CURRENT USE OF INSULIN (HCC): ICD-10-CM

## 2022-02-26 DIAGNOSIS — E78.5 DYSLIPIDEMIA: ICD-10-CM

## 2022-02-26 DIAGNOSIS — F32.A ANXIETY AND DEPRESSION: ICD-10-CM

## 2022-02-26 PROBLEM — E78.2 MIXED HYPERLIPIDEMIA: Status: ACTIVE | Noted: 2018-08-19

## 2022-02-26 LAB
ABO GROUP BLD: NORMAL
ALBUMIN SERPL BCP-MCNC: 4.3 G/DL (ref 3.2–4.9)
ALBUMIN/GLOB SERPL: 2 G/DL
ALP SERPL-CCNC: 47 U/L (ref 30–99)
ALT SERPL-CCNC: 11 U/L (ref 2–50)
ANION GAP SERPL CALC-SCNC: 13 MMOL/L (ref 7–16)
APTT PPP: 29.5 SEC (ref 24.7–36)
AST SERPL-CCNC: 15 U/L (ref 12–45)
BASOPHILS # BLD AUTO: 0.7 % (ref 0–1.8)
BASOPHILS # BLD: 0.07 K/UL (ref 0–0.12)
BILIRUB SERPL-MCNC: 0.3 MG/DL (ref 0.1–1.5)
BLD GP AB SCN SERPL QL: NORMAL
BUN SERPL-MCNC: 16 MG/DL (ref 8–22)
CALCIUM SERPL-MCNC: 9.1 MG/DL (ref 8.5–10.5)
CHLORIDE SERPL-SCNC: 102 MMOL/L (ref 96–112)
CO2 SERPL-SCNC: 21 MMOL/L (ref 20–33)
CREAT SERPL-MCNC: 0.96 MG/DL (ref 0.5–1.4)
EOSINOPHIL # BLD AUTO: 0.12 K/UL (ref 0–0.51)
EOSINOPHIL NFR BLD: 1.2 % (ref 0–6.9)
ERYTHROCYTE [DISTWIDTH] IN BLOOD BY AUTOMATED COUNT: 45.6 FL (ref 35.9–50)
GLOBULIN SER CALC-MCNC: 2.2 G/DL (ref 1.9–3.5)
GLUCOSE SERPL-MCNC: 131 MG/DL (ref 65–99)
HCT VFR BLD AUTO: 42.4 % (ref 42–52)
HGB BLD-MCNC: 13.8 G/DL (ref 14–18)
IMM GRANULOCYTES # BLD AUTO: 0.14 K/UL (ref 0–0.11)
IMM GRANULOCYTES NFR BLD AUTO: 1.4 % (ref 0–0.9)
INR PPP: 0.96 (ref 0.87–1.13)
LYMPHOCYTES # BLD AUTO: 3.61 K/UL (ref 1–4.8)
LYMPHOCYTES NFR BLD: 37.2 % (ref 22–41)
MCH RBC QN AUTO: 29.1 PG (ref 27–33)
MCHC RBC AUTO-ENTMCNC: 32.5 G/DL (ref 33.7–35.3)
MCV RBC AUTO: 89.3 FL (ref 81.4–97.8)
MONOCYTES # BLD AUTO: 0.86 K/UL (ref 0–0.85)
MONOCYTES NFR BLD AUTO: 8.9 % (ref 0–13.4)
NEUTROPHILS # BLD AUTO: 4.9 K/UL (ref 1.82–7.42)
NEUTROPHILS NFR BLD: 50.6 % (ref 44–72)
NRBC # BLD AUTO: 0 K/UL
NRBC BLD-RTO: 0 /100 WBC
PLATELET # BLD AUTO: 289 K/UL (ref 164–446)
PMV BLD AUTO: 10 FL (ref 9–12.9)
POTASSIUM SERPL-SCNC: 4.3 MMOL/L (ref 3.6–5.5)
PROT SERPL-MCNC: 6.5 G/DL (ref 6–8.2)
PROTHROMBIN TIME: 12.5 SEC (ref 12–14.6)
RBC # BLD AUTO: 4.75 M/UL (ref 4.7–6.1)
RH BLD: NORMAL
SODIUM SERPL-SCNC: 136 MMOL/L (ref 135–145)
TROPONIN T SERPL-MCNC: 14 NG/L (ref 6–19)
WBC # BLD AUTO: 9.7 K/UL (ref 4.8–10.8)

## 2022-02-26 PROCEDURE — 99285 EMERGENCY DEPT VISIT HI MDM: CPT

## 2022-02-26 PROCEDURE — 85610 PROTHROMBIN TIME: CPT

## 2022-02-26 PROCEDURE — 86850 RBC ANTIBODY SCREEN: CPT

## 2022-02-26 PROCEDURE — G0378 HOSPITAL OBSERVATION PER HR: HCPCS

## 2022-02-26 PROCEDURE — 70450 CT HEAD/BRAIN W/O DYE: CPT

## 2022-02-26 PROCEDURE — 84484 ASSAY OF TROPONIN QUANT: CPT

## 2022-02-26 PROCEDURE — 85025 COMPLETE CBC W/AUTO DIFF WBC: CPT

## 2022-02-26 PROCEDURE — 86901 BLOOD TYPING SEROLOGIC RH(D): CPT

## 2022-02-26 PROCEDURE — 70498 CT ANGIOGRAPHY NECK: CPT

## 2022-02-26 PROCEDURE — 99245 OFF/OP CONSLTJ NEW/EST HI 55: CPT | Performed by: STUDENT IN AN ORGANIZED HEALTH CARE EDUCATION/TRAINING PROGRAM

## 2022-02-26 PROCEDURE — 85730 THROMBOPLASTIN TIME PARTIAL: CPT

## 2022-02-26 PROCEDURE — 86900 BLOOD TYPING SEROLOGIC ABO: CPT

## 2022-02-26 PROCEDURE — 700102 HCHG RX REV CODE 250 W/ 637 OVERRIDE(OP): Performed by: STUDENT IN AN ORGANIZED HEALTH CARE EDUCATION/TRAINING PROGRAM

## 2022-02-26 PROCEDURE — 99220 PR INITIAL OBSERVATION CARE,LEVL III: CPT | Performed by: STUDENT IN AN ORGANIZED HEALTH CARE EDUCATION/TRAINING PROGRAM

## 2022-02-26 PROCEDURE — A9270 NON-COVERED ITEM OR SERVICE: HCPCS | Performed by: STUDENT IN AN ORGANIZED HEALTH CARE EDUCATION/TRAINING PROGRAM

## 2022-02-26 PROCEDURE — 94760 N-INVAS EAR/PLS OXIMETRY 1: CPT

## 2022-02-26 PROCEDURE — 0042T CT-CEREBRAL PERFUSION ANALYSIS: CPT

## 2022-02-26 PROCEDURE — 70496 CT ANGIOGRAPHY HEAD: CPT

## 2022-02-26 PROCEDURE — 80053 COMPREHEN METABOLIC PANEL: CPT

## 2022-02-26 PROCEDURE — 80164 ASSAY DIPROPYLACETIC ACD TOT: CPT

## 2022-02-26 PROCEDURE — 700117 HCHG RX CONTRAST REV CODE 255

## 2022-02-26 PROCEDURE — 71045 X-RAY EXAM CHEST 1 VIEW: CPT

## 2022-02-26 RX ORDER — MONTELUKAST SODIUM 10 MG/1
10 TABLET ORAL EVERY EVENING
Status: DISCONTINUED | OUTPATIENT
Start: 2022-02-26 | End: 2022-03-20 | Stop reason: HOSPADM

## 2022-02-26 RX ORDER — LISINOPRIL 10 MG/1
10 TABLET ORAL DAILY
Status: DISCONTINUED | OUTPATIENT
Start: 2022-02-27 | End: 2022-03-20 | Stop reason: HOSPADM

## 2022-02-26 RX ORDER — LURASIDONE HYDROCHLORIDE 20 MG/1
20 TABLET, FILM COATED ORAL EVERY EVENING
Status: DISCONTINUED | OUTPATIENT
Start: 2022-02-26 | End: 2022-03-20 | Stop reason: HOSPADM

## 2022-02-26 RX ORDER — BUDESONIDE AND FORMOTEROL FUMARATE DIHYDRATE 160; 4.5 UG/1; UG/1
2 AEROSOL RESPIRATORY (INHALATION) 2 TIMES DAILY
Status: DISCONTINUED | OUTPATIENT
Start: 2022-02-26 | End: 2022-03-20 | Stop reason: HOSPADM

## 2022-02-26 RX ORDER — POLYETHYLENE GLYCOL 3350 17 G/17G
1 POWDER, FOR SOLUTION ORAL
Status: DISCONTINUED | OUTPATIENT
Start: 2022-02-26 | End: 2022-03-20 | Stop reason: HOSPADM

## 2022-02-26 RX ORDER — LABETALOL HYDROCHLORIDE 5 MG/ML
10 INJECTION, SOLUTION INTRAVENOUS EVERY 4 HOURS PRN
Status: DISCONTINUED | OUTPATIENT
Start: 2022-02-26 | End: 2022-03-20 | Stop reason: HOSPADM

## 2022-02-26 RX ORDER — AMOXICILLIN 250 MG
2 CAPSULE ORAL 2 TIMES DAILY
Status: DISCONTINUED | OUTPATIENT
Start: 2022-02-26 | End: 2022-03-20 | Stop reason: HOSPADM

## 2022-02-26 RX ORDER — DIVALPROEX SODIUM 500 MG/1
1500 TABLET, DELAYED RELEASE ORAL
Status: DISCONTINUED | OUTPATIENT
Start: 2022-02-26 | End: 2022-03-20 | Stop reason: HOSPADM

## 2022-02-26 RX ORDER — ATORVASTATIN CALCIUM 20 MG/1
20 TABLET, FILM COATED ORAL EVERY EVENING
Status: DISCONTINUED | OUTPATIENT
Start: 2022-02-26 | End: 2022-03-20 | Stop reason: HOSPADM

## 2022-02-26 RX ORDER — LEVOTHYROXINE SODIUM 0.07 MG/1
225 TABLET ORAL
Status: DISCONTINUED | OUTPATIENT
Start: 2022-02-27 | End: 2022-03-20 | Stop reason: HOSPADM

## 2022-02-26 RX ORDER — BISACODYL 10 MG
10 SUPPOSITORY, RECTAL RECTAL
Status: DISCONTINUED | OUTPATIENT
Start: 2022-02-26 | End: 2022-03-20 | Stop reason: HOSPADM

## 2022-02-26 RX ORDER — DEXTROSE MONOHYDRATE 25 G/50ML
50 INJECTION, SOLUTION INTRAVENOUS
Status: DISCONTINUED | OUTPATIENT
Start: 2022-02-26 | End: 2022-03-20 | Stop reason: HOSPADM

## 2022-02-26 RX ORDER — PRAZOSIN HYDROCHLORIDE 2 MG/1
4 CAPSULE ORAL EVERY EVENING
Status: DISCONTINUED | OUTPATIENT
Start: 2022-02-26 | End: 2022-03-20 | Stop reason: HOSPADM

## 2022-02-26 RX ADMIN — PRAZOSIN HYDROCHLORIDE 4 MG: 2 CAPSULE ORAL at 22:42

## 2022-02-26 RX ADMIN — DIVALPROEX SODIUM 1500 MG: 500 TABLET, DELAYED RELEASE ORAL at 22:41

## 2022-02-26 RX ADMIN — SERTRALINE 150 MG: 50 TABLET, FILM COATED ORAL at 22:41

## 2022-02-26 RX ADMIN — IOHEXOL 85 ML: 350 INJECTION, SOLUTION INTRAVENOUS at 20:20

## 2022-02-26 RX ADMIN — LURASIDONE HYDROCHLORIDE 20 MG: 20 TABLET, FILM COATED ORAL at 22:42

## 2022-02-26 RX ADMIN — ATORVASTATIN CALCIUM 20 MG: 20 TABLET, FILM COATED ORAL at 22:41

## 2022-02-26 RX ADMIN — BUDESONIDE AND FORMOTEROL FUMARATE DIHYDRATE 2 PUFF: 160; 4.5 AEROSOL RESPIRATORY (INHALATION) at 22:42

## 2022-02-26 RX ADMIN — IOHEXOL 40 ML: 350 INJECTION, SOLUTION INTRAVENOUS at 20:17

## 2022-02-26 ASSESSMENT — ENCOUNTER SYMPTOMS
SPEECH CHANGE: 1
FOCAL WEAKNESS: 1
COUGH: 0
SORE THROAT: 0
NAUSEA: 0
CHILLS: 0
FEVER: 0
SHORTNESS OF BREATH: 0
DIARRHEA: 0
VOMITING: 0
DIZZINESS: 0
HEADACHES: 0
ABDOMINAL PAIN: 0

## 2022-02-26 ASSESSMENT — FIBROSIS 4 INDEX: FIB4 SCORE: 0.5

## 2022-02-27 LAB
ANION GAP SERPL CALC-SCNC: 11 MMOL/L (ref 7–16)
BUN SERPL-MCNC: 15 MG/DL (ref 8–22)
CALCIUM SERPL-MCNC: 8.7 MG/DL (ref 8.5–10.5)
CHLORIDE SERPL-SCNC: 102 MMOL/L (ref 96–112)
CO2 SERPL-SCNC: 22 MMOL/L (ref 20–33)
CREAT SERPL-MCNC: 0.96 MG/DL (ref 0.5–1.4)
ERYTHROCYTE [DISTWIDTH] IN BLOOD BY AUTOMATED COUNT: 45.4 FL (ref 35.9–50)
GLUCOSE BLD STRIP.AUTO-MCNC: 142 MG/DL (ref 65–99)
GLUCOSE BLD STRIP.AUTO-MCNC: 151 MG/DL (ref 65–99)
GLUCOSE BLD STRIP.AUTO-MCNC: 168 MG/DL (ref 65–99)
GLUCOSE BLD STRIP.AUTO-MCNC: 170 MG/DL (ref 65–99)
GLUCOSE SERPL-MCNC: 129 MG/DL (ref 65–99)
HCT VFR BLD AUTO: 40.5 % (ref 42–52)
HGB BLD-MCNC: 13.4 G/DL (ref 14–18)
MCH RBC QN AUTO: 29.2 PG (ref 27–33)
MCHC RBC AUTO-ENTMCNC: 33.1 G/DL (ref 33.7–35.3)
MCV RBC AUTO: 88.2 FL (ref 81.4–97.8)
PLATELET # BLD AUTO: 229 K/UL (ref 164–446)
PMV BLD AUTO: 9.7 FL (ref 9–12.9)
POTASSIUM SERPL-SCNC: 4 MMOL/L (ref 3.6–5.5)
RBC # BLD AUTO: 4.59 M/UL (ref 4.7–6.1)
SODIUM SERPL-SCNC: 135 MMOL/L (ref 135–145)
T4 FREE SERPL-MCNC: 1.36 NG/DL (ref 0.93–1.7)
TSH SERPL DL<=0.005 MIU/L-ACNC: 8.1 UIU/ML (ref 0.38–5.33)
VALPROATE SERPL-MCNC: 89.7 UG/ML (ref 50–100)
WBC # BLD AUTO: 8.5 K/UL (ref 4.8–10.8)

## 2022-02-27 PROCEDURE — 700102 HCHG RX REV CODE 250 W/ 637 OVERRIDE(OP): Performed by: INTERNAL MEDICINE

## 2022-02-27 PROCEDURE — A9270 NON-COVERED ITEM OR SERVICE: HCPCS | Performed by: GENERAL PRACTICE

## 2022-02-27 PROCEDURE — 99225 PR SUBSEQUENT OBSERVATION CARE,LEVEL II: CPT | Performed by: INTERNAL MEDICINE

## 2022-02-27 PROCEDURE — 700111 HCHG RX REV CODE 636 W/ 250 OVERRIDE (IP): Performed by: STUDENT IN AN ORGANIZED HEALTH CARE EDUCATION/TRAINING PROGRAM

## 2022-02-27 PROCEDURE — 80048 BASIC METABOLIC PNL TOTAL CA: CPT

## 2022-02-27 PROCEDURE — G0378 HOSPITAL OBSERVATION PER HR: HCPCS

## 2022-02-27 PROCEDURE — 82962 GLUCOSE BLOOD TEST: CPT

## 2022-02-27 PROCEDURE — 700102 HCHG RX REV CODE 250 W/ 637 OVERRIDE(OP): Performed by: STUDENT IN AN ORGANIZED HEALTH CARE EDUCATION/TRAINING PROGRAM

## 2022-02-27 PROCEDURE — A9270 NON-COVERED ITEM OR SERVICE: HCPCS | Performed by: INTERNAL MEDICINE

## 2022-02-27 PROCEDURE — 96372 THER/PROPH/DIAG INJ SC/IM: CPT

## 2022-02-27 PROCEDURE — 84443 ASSAY THYROID STIM HORMONE: CPT

## 2022-02-27 PROCEDURE — 84439 ASSAY OF FREE THYROXINE: CPT

## 2022-02-27 PROCEDURE — 700102 HCHG RX REV CODE 250 W/ 637 OVERRIDE(OP): Performed by: GENERAL PRACTICE

## 2022-02-27 PROCEDURE — 85027 COMPLETE CBC AUTOMATED: CPT

## 2022-02-27 PROCEDURE — A9270 NON-COVERED ITEM OR SERVICE: HCPCS | Performed by: STUDENT IN AN ORGANIZED HEALTH CARE EDUCATION/TRAINING PROGRAM

## 2022-02-27 RX ORDER — ACETAMINOPHEN 500 MG
1000 TABLET ORAL EVERY 6 HOURS
Status: DISPENSED | OUTPATIENT
Start: 2022-02-27 | End: 2022-03-04

## 2022-02-27 RX ORDER — KETOROLAC TROMETHAMINE 30 MG/ML
30 INJECTION, SOLUTION INTRAMUSCULAR; INTRAVENOUS EVERY 6 HOURS PRN
Status: DISCONTINUED | OUTPATIENT
Start: 2022-02-27 | End: 2022-03-01

## 2022-02-27 RX ORDER — IBUPROFEN 600 MG/1
600 TABLET ORAL ONCE
Status: COMPLETED | OUTPATIENT
Start: 2022-02-27 | End: 2022-02-27

## 2022-02-27 RX ORDER — ACETAMINOPHEN 500 MG
1000 TABLET ORAL EVERY 6 HOURS PRN
Status: DISCONTINUED | OUTPATIENT
Start: 2022-03-04 | End: 2022-03-20 | Stop reason: HOSPADM

## 2022-02-27 RX ORDER — IBUPROFEN 600 MG/1
600 TABLET ORAL 3 TIMES DAILY PRN
Status: DISCONTINUED | OUTPATIENT
Start: 2022-03-02 | End: 2022-03-01

## 2022-02-27 RX ADMIN — PRAZOSIN HYDROCHLORIDE 4 MG: 2 CAPSULE ORAL at 18:04

## 2022-02-27 RX ADMIN — INSULIN HUMAN 2 UNITS: 100 INJECTION, SOLUTION PARENTERAL at 20:52

## 2022-02-27 RX ADMIN — IBUPROFEN 600 MG: 600 TABLET ORAL at 20:42

## 2022-02-27 RX ADMIN — BUDESONIDE AND FORMOTEROL FUMARATE DIHYDRATE 2 PUFF: 160; 4.5 AEROSOL RESPIRATORY (INHALATION) at 18:08

## 2022-02-27 RX ADMIN — LURASIDONE HYDROCHLORIDE 20 MG: 20 TABLET, FILM COATED ORAL at 18:03

## 2022-02-27 RX ADMIN — DIVALPROEX SODIUM 1500 MG: 500 TABLET, DELAYED RELEASE ORAL at 20:46

## 2022-02-27 RX ADMIN — INSULIN GLARGINE 30 UNITS: 100 INJECTION, SOLUTION SUBCUTANEOUS at 17:59

## 2022-02-27 RX ADMIN — INSULIN HUMAN 2 UNITS: 100 INJECTION, SOLUTION PARENTERAL at 17:59

## 2022-02-27 RX ADMIN — INSULIN GLARGINE 30 UNITS: 100 INJECTION, SOLUTION SUBCUTANEOUS at 08:05

## 2022-02-27 RX ADMIN — ATORVASTATIN CALCIUM 20 MG: 20 TABLET, FILM COATED ORAL at 18:03

## 2022-02-27 RX ADMIN — MONTELUKAST 10 MG: 10 TABLET, FILM COATED ORAL at 18:03

## 2022-02-27 RX ADMIN — ACETAMINOPHEN 1000 MG: 500 TABLET ORAL at 18:03

## 2022-02-27 RX ADMIN — SERTRALINE 150 MG: 50 TABLET, FILM COATED ORAL at 20:45

## 2022-02-27 RX ADMIN — INSULIN HUMAN 2 UNITS: 100 INJECTION, SOLUTION PARENTERAL at 10:18

## 2022-02-27 RX ADMIN — ENOXAPARIN SODIUM 40 MG: 40 INJECTION SUBCUTANEOUS at 05:20

## 2022-02-27 RX ADMIN — ACETAMINOPHEN 1000 MG: 500 TABLET ORAL at 13:55

## 2022-02-27 RX ADMIN — BUDESONIDE AND FORMOTEROL FUMARATE DIHYDRATE 2 PUFF: 160; 4.5 AEROSOL RESPIRATORY (INHALATION) at 06:00

## 2022-02-27 RX ADMIN — LEVOTHYROXINE SODIUM 225 MCG: 0.07 TABLET ORAL at 05:20

## 2022-02-27 ASSESSMENT — ENCOUNTER SYMPTOMS
FOCAL WEAKNESS: 1
SPEECH CHANGE: 1
SEIZURES: 0
FALLS: 0
SORE THROAT: 1
SENSORY CHANGE: 0
FEVER: 0
BLURRED VISION: 1
CHILLS: 0

## 2022-02-27 ASSESSMENT — PAIN DESCRIPTION - PAIN TYPE
TYPE: ACUTE PAIN

## 2022-02-27 NOTE — H&P
Hospital Medicine History & Physical Note    Date of Service  2/26/2022    Primary Care Physician  SHANTA Stern.    Consultants  neurology  Code Status  Full Code    Chief Complaint  Chief Complaint   Patient presents with   • Possible Stroke     LKW 1900. Patient states sudden onset right sided weakness. Patient has no effort against gravity for R arm or R leg. Facial droop noted. Patient has stuttered speech and endorses HA       History of Presenting Illness  Norm Prabhakar is a 41 y.o. male who presented 2/26/2022 with right-sided weakness and speech changes.  PMH of asthma, DM 2, bipolar disorder, hypothyroidism, seizure disorder, depression/anxiety, HTN, dyslipidemia.  Said he was last normal around 6:30 when symptoms began.  This is a chronic issue for him has been going on for years now with multiple previous negative MRIs.  Current symptoms are similar to prior, says usually improve in a couple days.    CT head, CT head perfusion, CTA head and neck in the ED negative for acute abnormality.  Neurology consulted in the ED, conversion disorder suspected.  Recommends admission for observation and psych consult.    I discussed the plan of care with patient.    Review of Systems  Review of Systems   Constitutional: Negative for chills and fever.   HENT: Negative for sore throat.    Respiratory: Negative for cough and shortness of breath.    Cardiovascular: Negative for chest pain.   Gastrointestinal: Negative for abdominal pain, diarrhea, nausea and vomiting.   Genitourinary: Negative for dysuria and urgency.   Neurological: Positive for speech change and focal weakness. Negative for dizziness and headaches.   All other systems reviewed and are negative.      Past Medical History   has a past medical history of Anxiety, ASTHMA, Bipolar 1 disorder (HCC), Depression, Diabetes (HCC), Fall, Glaucoma, Glaucoma (1982), Hypothyroidism, Indigestion, Mental disorder, Murmur, Pneumonia, Psychiatric problem  "(2002), S/P thyroidectomy, Seizure (HCC) (2010), Seizure disorder (HCC), and Unspecified disorder of thyroid.    Surgical History   has a past surgical history that includes eye surgery; thyroid lobectomy; and other.     Family History  family history includes Heart Disease in his mother; Hypertension in his mother; Lung Disease in his mother; Stroke in his maternal grandmother.   Family history reviewed with patient. There is no family history that is pertinent to the chief complaint.     Social History   reports that he quit smoking about 22 months ago. His smoking use included cigarettes and cigars. He smoked 0.25 packs per day. He has never used smokeless tobacco. He reports previous alcohol use. He reports current drug use. Drug: Inhaled.    Allergies  Allergies   Allergen Reactions   • Abilify      \"Feeling tired, like I don't even know whats going on around me\"   • Fish      Pt reports fish causes him to be sick to his stomach  Not listed on MAR noted 2/3/2021     • Geodon [Ziprasidone Hcl] Anaphylaxis     Anaphylaxis per patient   • Aripiprazole      \"I became lethargic.\"   • Ziprasidone        Medications  Prior to Admission Medications   Prescriptions Last Dose Informant Patient Reported? Taking?   Cholecalciferol (VITAMIN D3) 2000 UNIT Cap  Other Facility Yes No   Sig: Take 4,000 Units by mouth every evening.   Empagliflozin (JARDIANCE) 25 MG Tab  Other Facility Yes No   Sig: Take 25 mg by mouth every day.   Fenofibrate 134 MG Cap  Other Facility Yes No   Sig: Take 134 mg by mouth every day.   Omega-3 Fatty Acids (FISH OIL) 1000 MG Cap capsule  Other Facility Yes No   Sig: Take 1,000 mg by mouth 2 Times a Day.   SITagliptin (JANUVIA) 100 MG Tab  Other Facility Yes No   Sig: Take 100 mg by mouth every day.   albuterol 108 (90 Base) MCG/ACT Aero Soln inhalation aerosol  Other Facility Yes No   Sig: Inhale 2 Puffs every 6 hours as needed for Shortness of Breath.   atorvastatin (LIPITOR) 10 MG Tab  Other " Facility Yes No   Sig: Take 10 mg by mouth every evening.   budesonide-formoterol (SYMBICORT) 160-4.5 MCG/ACT Aerosol  Other Facility Yes No   Sig: Inhale 2 Puffs 2 Times a Day.   divalproex (DEPAKOTE) 500 MG Tablet Delayed Response  Other Facility Yes No   Sig: Take 500-1,500 mg by mouth 2 times a day. 1 tablet = 500 mg every morning  3 tablets = 1500 mg every evening   docusate sodium (COLACE) 100 MG Cap  Other Facility Yes No   Sig: Take 100 mg by mouth every day.   insulin glargine (BASAGLAR KWIKPEN) 100 UNIT/ML injection PEN  Other Facility Yes No   Sig: Inject 30 Units under the skin 2 times a day.   levothyroxine (SYNTHROID) 200 MCG Tab  Other Facility Yes No   Sig: Take 200 mcg by mouth every morning on an empty stomach. Take in addition to 25 mcg tablet for daily dose of 225 mcg   levothyroxine (SYNTHROID) 25 MCG Tab  Other Facility Yes No   Sig: Take 200 mcg by mouth every morning on an empty stomach. Take in addition to 200 mcg tablet for daily dose of 225 mcg   lisinopril (PRINIVIL) 10 MG Tab  Other Facility Yes No   Sig: Take 10 mg by mouth every day.   lurasidone (LATUDA) 20 MG Tab  Other Facility Yes No   Sig: Take 20 mg by mouth every evening.   metformin (GLUCOPHAGE) 1000 MG tablet  Other Facility Yes No   Sig: Take 1,000 mg by mouth 2 times a day with meals.   montelukast (SINGULAIR) 10 MG Tab  Other Facility Yes No   Sig: Take 10 mg by mouth every evening.   prazosin (MINIPRESS) 2 MG Cap  Other Facility Yes No   Sig: Take 4 mg by mouth every evening. 2 caps = 4 mg   sertraline (ZOLOFT) 100 MG Tab  Other Facility Yes No   Sig: Take 100 mg by mouth every day.   sumatriptan (IMITREX) 100 MG tablet   No No   Sig: Take 100 mg at the onset of aura/HA; may re-dose x1 after 2 hrs if HA persists; MDD: 200 mg; do not use >2 days/week.      Facility-Administered Medications: None       Physical Exam  Pulse:  [75] 75  Resp:  [20] 20  BP: (133)/(71) 133/71  SpO2:  [94 %] 94 %  Blood Pressure: 133/71        Pulse: 75   Respiration: 20   Pulse Oximetry: 94 %       Physical Exam  Constitutional:       Appearance: He is obese.   HENT:      Head: Normocephalic and atraumatic.      Mouth/Throat:      Mouth: Mucous membranes are moist.      Pharynx: Oropharynx is clear. No oropharyngeal exudate or posterior oropharyngeal erythema.   Eyes:      General: No scleral icterus.  Cardiovascular:      Rate and Rhythm: Normal rate and regular rhythm.      Pulses: Normal pulses.      Heart sounds: Normal heart sounds. No murmur heard.  Pulmonary:      Effort: Pulmonary effort is normal. No respiratory distress.      Breath sounds: Normal breath sounds. No wheezing.   Abdominal:      Palpations: Abdomen is soft.      Tenderness: There is no abdominal tenderness.   Musculoskeletal:         General: No swelling or tenderness. Normal range of motion.      Cervical back: Normal range of motion.   Skin:     General: Skin is warm and dry.   Neurological:      Mental Status: He is alert and oriented to person, place, and time.      Comments: 1/5 right upper and right lower extremity weakness on direct exam.  While speaking with patient and not examining however his right foot and toes were moving.  Stuttering speech   Psychiatric:         Mood and Affect: Mood normal.         Laboratory:  Recent Labs     02/26/22 1952   WBC 9.7   RBC 4.75   HEMOGLOBIN 13.8*   HEMATOCRIT 42.4   MCV 89.3   MCH 29.1   MCHC 32.5*   RDW 45.6   PLATELETCT 289   MPV 10.0     Recent Labs     02/26/22 1952   SODIUM 136   POTASSIUM 4.3   CHLORIDE 102   CO2 21   GLUCOSE 131*   BUN 16   CREATININE 0.96   CALCIUM 9.1     Recent Labs     02/26/22 1952   ALTSGPT 11   ASTSGOT 15   ALKPHOSPHAT 47   TBILIRUBIN 0.3   GLUCOSE 131*     Recent Labs     02/26/22 1952   APTT 29.5   INR 0.96     No results for input(s): NTPROBNP in the last 72 hours.      Recent Labs     02/26/22 1952   TROPONINT 14       Imaging:  DX-CHEST-PORTABLE (1 VIEW)   Final Result      No acute  cardiopulmonary abnormality.      CT-CTA NECK WITH & W/O-POST PROCESSING   Final Result      1.  No occlusion or hemodynamically significant stenosis of the neck arteries.   2.  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      CT-CTA HEAD WITH & W/O-POST PROCESS   Final Result      CT angiogram of the [place] within normal limits.      CT-CEREBRAL PERFUSION ANALYSIS   Final Result      1.  Cerebral blood flow less than 30% likely representing completed infarct = 0 mL.      2.  T Max more than 6 seconds likely representing combination of completed infarct and ischemia = 0 mL.      3.  Mismatched volume likely representing ischemic brain/penumbra = None      4.  Please note that the cerebral perfusion was performed on the limited brain tissue around the basal ganglia region. Infarct/ischemia outside the CT perfusion sections can be missed in this study.      CT-HEAD W/O   Final Result      1.  Confluent cerebral white matter hypodensities again noted which may represent severe chronic leukoencephalopathy..   2.  No acute intracranial abnormality.             X-Ray:  I have personally reviewed the images and compared with prior images.    Assessment/Plan:  I anticipate this patient is appropriate for observation status at this time.    * Acute right-sided weakness- (present on admission)  Assessment & Plan  Chronic recurrent issue, per our records he has had 19 MRI head since 2011 with the majority ordered for right-sided weakness  CT scans in the ED negative for acute abnormality, chronic moderate cerebellar loss  Patient says this is similar to his previous episodes and symptoms usually resolve in a few days or less  On exam 0/5 strength, however while talking to the patient his right toe and foot do move  Neurology evaluated in the ED and suggest possible conversion disorder  IP psych consult placed, day team to contact psych    Conversion disorder- (present on  admission)  Assessment & Plan  Recurrent right-sided weakness, admitted again for this  Psych consult placed     Type 2 diabetes mellitus without complication, without long-term current use of insulin (HCC)- (present on admission)  Assessment & Plan  Hold home orals.  Continue insulin glargine  Insulin sliding scale hypoglycemia protocol in the hospital    Postoperative hypothyroidism- (present on admission)  Assessment & Plan  Continue levothyroxine    Primary hypertension- (present on admission)  Assessment & Plan  Continue lisinopril    Mixed hyperlipidemia- (present on admission)  Assessment & Plan  Continue atorvastatin    Class 1 obesity due to excess calories with serious comorbidity and body mass index (BMI) of 32.0 to 32.9 in adult- (present on admission)  Assessment & Plan  BMI 32.7    PTSD (post-traumatic stress disorder)- (present on admission)  Assessment & Plan  Continue sertraline and prazosin    History of seizure- (present on admission)  Assessment & Plan  Continue divalproex    Anxiety and depression- (present on admission)  Assessment & Plan  Continue sertraline    Asthma- (present on admission)  Assessment & Plan  Continue montelukast and home inhaler      VTE prophylaxis: enoxaparin ppx

## 2022-02-27 NOTE — CONSULTS
"Neurology Initial Consult H&P  Neurohospitalist Service, [facility]    Referring Physician: ALDO Mills.*    Chief Complaint   Patient presents with    Possible Stroke     LKW 1900. Patient states sudden onset right sided weakness. Patient has no effort against gravity for R arm or R leg. Facial droop noted. Patient has stuttered speech and endorses HYDE       HPI: Norm Prabhakar is a 41 y.o. man presenting for acute onset R sided weakness and difficulty speaking. He states that at 7PM he was watching Tv and suddenly got numbness and weakness on his entire left side. He has had a headache for the past several days.    He denies any LoC or seizure activity, recent stressors, confusion, or difficulty understanding spoken language.     Per my previous consult 8/30/21:  He states that he has had symptoms like this in the past, and chart review reveals that he was treated at [facility] for the same symptoms in March 2021. This was called \"recurrent right sided weakness\" at that time, so it seems to have happened multiple times. He had a negative stroke workup including MRI at that time and was discharged back to his baseline in a number of days.     He also has a history of epilepsy, mood disorder, psychiatric disorders, and stays in behavioral/substance assistance facilities. He sees Dr. Franco as his outpatient neurologist and takes Depakote 500mg TID, but there is no specification or testing found for his epilepsy. He is unable to provide reliable seizure history but states that he did not lose consciousness today.    Review of systems: In addition to what is detailed in the HPI above, all other systems reviewed and are negative.    Past Medical History:    has a past medical history of Anxiety, ASTHMA, Bipolar 1 disorder (HCC), Depression, Diabetes (HCC), Fall, Glaucoma, Glaucoma (1982), Hypothyroidism, Indigestion, Mental disorder, Murmur, Pneumonia, Psychiatric problem (2002), S/P " "thyroidectomy, Seizure (HCC) (2010), Seizure disorder (HCC), and Unspecified disorder of thyroid.    FHx:  family history includes Heart Disease in his mother; Hypertension in his mother; Lung Disease in his mother; Stroke in his maternal grandmother.    SHx:   reports that he quit smoking about 22 months ago. His smoking use included cigarettes and cigars. He smoked 0.25 packs per day. He has never used smokeless tobacco. He reports previous alcohol use. He reports current drug use. Drug: Inhaled.    Allergies:  Allergies   Allergen Reactions    Abilify      \"Feeling tired, like I don't even know whats going on around me\"    Fish      Pt reports fish causes him to be sick to his stomach  Not listed on MAR noted 2/3/2021      Geodon [Ziprasidone Hcl] Anaphylaxis     Anaphylaxis per patient    Aripiprazole      \"I became lethargic.\"    Ziprasidone        Medications:  No current facility-administered medications for this encounter.    Current Outpatient Medications:     sumatriptan (IMITREX) 100 MG tablet, Take 100 mg at the onset of aura/HA; may re-dose x1 after 2 hrs if HA persists; MDD: 200 mg; do not use >2 days/week., Disp: 9 Tablet, Rfl: 5    metformin (GLUCOPHAGE) 1000 MG tablet, Take 1,000 mg by mouth 2 times a day with meals., Disp: , Rfl:     docusate sodium (COLACE) 100 MG Cap, Take 100 mg by mouth every day., Disp: , Rfl:     levothyroxine (SYNTHROID) 200 MCG Tab, Take 200 mcg by mouth every morning on an empty stomach. Take in addition to 25 mcg tablet for daily dose of 225 mcg, Disp: , Rfl:     divalproex (DEPAKOTE) 500 MG Tablet Delayed Response, Take 500-1,500 mg by mouth 2 times a day. 1 tablet = 500 mg every morning 3 tablets = 1500 mg every evening, Disp: , Rfl:     SITagliptin (JANUVIA) 100 MG Tab, Take 100 mg by mouth every day., Disp: , Rfl:     Fenofibrate 134 MG Cap, Take 134 mg by mouth every day., Disp: , Rfl:     albuterol 108 (90 Base) MCG/ACT Aero Soln inhalation aerosol, Inhale 2 Puffs " "every 6 hours as needed for Shortness of Breath., Disp: , Rfl:     sertraline (ZOLOFT) 100 MG Tab, Take 100 mg by mouth every day., Disp: , Rfl:     levothyroxine (SYNTHROID) 25 MCG Tab, Take 200 mcg by mouth every morning on an empty stomach. Take in addition to 200 mcg tablet for daily dose of 225 mcg, Disp: , Rfl:     Empagliflozin (JARDIANCE) 25 MG Tab, Take 25 mg by mouth every day., Disp: , Rfl:     budesonide-formoterol (SYMBICORT) 160-4.5 MCG/ACT Aerosol, Inhale 2 Puffs 2 Times a Day., Disp: , Rfl:     lurasidone (LATUDA) 20 MG Tab, Take 20 mg by mouth every evening., Disp: , Rfl:     atorvastatin (LIPITOR) 10 MG Tab, Take 10 mg by mouth every evening., Disp: , Rfl:     lisinopril (PRINIVIL) 10 MG Tab, Take 10 mg by mouth every day., Disp: , Rfl:     Cholecalciferol (VITAMIN D3) 2000 UNIT Cap, Take 4,000 Units by mouth every evening., Disp: , Rfl:     prazosin (MINIPRESS) 2 MG Cap, Take 4 mg by mouth every evening. 2 caps = 4 mg, Disp: , Rfl:     montelukast (SINGULAIR) 10 MG Tab, Take 10 mg by mouth every evening., Disp: , Rfl:     Omega-3 Fatty Acids (FISH OIL) 1000 MG Cap capsule, Take 1,000 mg by mouth 2 Times a Day., Disp: , Rfl:     insulin glargine (BASAGLAR KWIKPEN) 100 UNIT/ML injection PEN, Inject 30 Units under the skin 2 times a day., Disp: , Rfl:     Physical Examination:     General: Patient is awake and in no acute distress  Eye: Examination of optic disks not indicated at this time given acuity of consult  Neck: There is normal range of motion  CV: regular rate   Extremities:  clear, dry, intact, without peripheral edema    NEUROLOGICAL EXAM:     Ht 1.981 m (6' 6\")   Wt (!) 128 kg (283 lb)   BMI 32.70 kg/m²       Mental status: Awake, alert and fully oriented  Speech and language: Speech is stuttering but fluent. Able to understand and follow all commands.  Cranial nerve exam: Pupils are equal, round and reactive to light bilaterally. Visual fields are full. There is no nystagmus. " Extraocular muscles are intact. Face is symmetric. Sensation in the face is intact to light touch. Palate elevates symmetrically. Tongue is midline.  Motor exam: No volitional movement on the R arm or leg, but does facilitate when undergoing passive movement. Tone is normal. No abnormal movements were seen on exam.  Sensory exam: Reports decreased sensation on the R arm and leg.  Deep tendon reflexes:  2+ throughout. Toes down-going bilaterally.  Coordination: No ataxia on L finger-to-nose testing  Gait: Deferred due to patient preference    NIHSS: National Institutes of Health Stroke Scale    [0] 1a:Level of Consciousness    0-alert 1-drowsy   2-stupor   3-coma  [0] 1b:LOC Questions                  0-both  1-one      2-neither  [0] 1c:LOC Commands                   0-both  1-one      2-neither  [0] 2: Best Gaze                     0-nl    1-partial  2-forced  [0] 3: Visual Fields                   0-nl    1-partial  2-complete 3-bilat  [0] 4: Facial Paresis                0-nl    1-minor    2-partial  3-full  MOTOR                       0-nl  [4] 5: Right Arm           1-drift  [0] 6: Left Arm             2-some effort vs gravity  [4] 7: Right Leg           3-no effort vs gravity  [0] 8: Left Leg             4-no movement                             x-untestable  [0] 9: Limb Ataxia                    0-abs   1-1_limb   2-2+_limbs       x-untestable  [1] 10:Sensory                        0-nl    1-partial  2-dense  [0] 11:Best Language/Aphasia         0-nl    1-mild/mod 2-severe   3-mute  [0] 12:Dysarthria                     0-nl    1-mild/mod 2-severe       x-untestable  [0] 13:Neglect/Inattention            0-none  1-partial  2-complete  [9] TOTAL    NIH Time 2015      Objective Data:    Labs:  Lab Results   Component Value Date/Time    PROTHROMBTM 13.8 08/30/2021 03:59 PM    INR 1.10 08/30/2021 03:59 PM      Lab Results   Component Value Date/Time    WBC 12.0 (H) 08/30/2021 03:59 PM    RBC 4.96 08/30/2021 03:59  PM    HEMOGLOBIN 14.0 08/30/2021 03:59 PM    HEMATOCRIT 43.2 08/30/2021 03:59 PM    MCV 87.1 08/30/2021 03:59 PM    MCH 28.2 08/30/2021 03:59 PM    MCHC 32.4 (L) 08/30/2021 03:59 PM    MPV 9.7 08/30/2021 03:59 PM    NEUTSPOLYS 64.90 08/30/2021 03:59 PM    LYMPHOCYTES 21.90 (L) 08/30/2021 03:59 PM    MONOCYTES 9.40 08/30/2021 03:59 PM    EOSINOPHILS 0.80 08/30/2021 03:59 PM    BASOPHILS 1.00 08/30/2021 03:59 PM    ANISOCYTOSIS 2+ (A) 07/01/2021 07:44 PM      Lab Results   Component Value Date/Time    SODIUM 138 08/30/2021 03:59 PM    POTASSIUM 3.8 08/30/2021 03:59 PM    CHLORIDE 101 08/30/2021 03:59 PM    CO2 22 08/30/2021 03:59 PM    GLUCOSE 124 (H) 08/30/2021 03:59 PM    BUN 19 08/30/2021 03:59 PM    CREATININE 1.15 08/30/2021 03:59 PM      Lab Results   Component Value Date/Time    CHOLSTRLTOT 199 03/10/2021 03:48 AM    LDL see below 03/10/2021 03:48 AM    HDL 29 (A) 03/10/2021 03:48 AM    TRIGLYCERIDE 636 (H) 03/10/2021 03:48 AM       Lab Results   Component Value Date/Time    ALKPHOSPHAT 51 08/30/2021 03:59 PM    ASTSGOT 17 08/30/2021 03:59 PM    ALTSGPT 16 08/30/2021 03:59 PM    TBILIRUBIN 0.3 08/30/2021 03:59 PM        Imaging/Testing:    I interpreted and/or reviewed the patient's neuroimaging    DX-CHEST-PORTABLE (1 VIEW)    (Results Pending)   CT-HEAD W/O    (Results Pending)   CT-CEREBRAL PERFUSION ANALYSIS    (Results Pending)   CT-CTA HEAD WITH & W/O-POST PROCESS    (Results Pending)   CT-CTA NECK WITH & W/O-POST PROCESSING    (Results Pending)       Assessment and Plan:    Norm Prabhakar is a 41 y.o. man with a history of conversion disorder, syncope, PTSD, depression, and recurrent R sided weakness and stuttering in the setting of normal MRI in the past. He was last seen August and March of 2021 with identical presentations. Today he presents with acute onset R sided weakness and stuttering speech. There are nonphysiologic components to his weakness including facilitation on passive ROM.  Neuro-imaging reviewed and without acute changes or concerning stenoses or perfusion deficit.     The repeated nature of these events is reassuring. Suspect conversion disorder.  Doubt utility to repeat MRI brain, but may pursue if not returning to baseline.    Recommend admission for obs, headache treatment, PT/OT, and potentially psychiatric eval if patient is amenable.    Do not recommend tPA given low suspicion for stroke.    Problem list:  1. R sided weakness and stuttering      The evaluation of the patient, and recommended management, was discussed with the resident staff.     Woo Harris D.O.  Neurohospitalist, Acute Care Services

## 2022-02-27 NOTE — ASSESSMENT & PLAN NOTE
Recurrent right-sided weakness, admitted again for this  Psych consulted  Currently on  Depakote 1500 mg P.O at HS  Lurasidone 20mg P.O in the evening  Prazosin 4mg P.O every evening  Sertraline 150mg P.O Daily

## 2022-02-27 NOTE — ED TRIAGE NOTES
Chief Complaint   Patient presents with   • Possible Stroke     LKW 1900. Patient states sudden onset right sided weakness. Patient has no effort against gravity for R arm or R leg. Facial droop noted. Patient has stuttered speech and endorses HA     42 yo male bib EMS for above. Patient reports symptoms started suddenly at 1900.  NIH 12 per ERP.  Patient to charge desk as Stroke Alert. Patient to CT and then red2. Report to [facility].

## 2022-02-27 NOTE — ED NOTES
Pt was medicated. Pt shows no s/s of distress. BP meds were held due to BP and HR both being low. Call light was left within reach bed in lowest positron and will continue to monitor.

## 2022-02-27 NOTE — ASSESSMENT & PLAN NOTE
Chronic recurrent issue, per our records he has had 19 MRI head since 2011 with the majority ordered for right-sided weakness  CT scans in the ED negative for acute abnormality, chronic moderate cerebellar loss  Patient says this is similar to his previous episodes and symptoms usually resolve in a few days or less  Weakness likely due to conversion disorder  Psychiatry following  Patient refused by all SNFs, not a rehab candidate  [facility] not taking patient back, patient [place] not qualify [place] [facility] [place]  Plan for patient to discharge to home with mother. CM aware, trying to get in contact with mother regarding discharge planning.

## 2022-02-27 NOTE — PROGRESS NOTES
Hospital Medicine Daily Progress Note    Date of Service  2/27/2022    Chief Complaint  Norm Prabhakar is a 41 y.o. male admitted 2/26/2022 with right arm weakness and stuttering.    Hospital Course  No notes on file    Interval Problem Update  - admitted yesterday  - evaluated by neurology, thought likely conversion disorder  - CT head/neck imaging negative  - no improvement in R arm weakness, R leg also weak, still stuttering  - afebrile    I have personally seen and examined the patient at bedside. I discussed the plan of care with patient.    Consultants/Specialty  neurology    Code Status  Full Code    Disposition  Patient is medically cleared for discharge.   Anticipate discharge to D.  I have placed the appropriate orders for post-discharge needs.    Review of Systems  Review of Systems   Constitutional: Negative for chills and fever.   HENT: Positive for sore throat.    Eyes: Positive for blurred vision (vision loss of R eye chronically).   Cardiovascular: Negative for chest pain and leg swelling.   Genitourinary: Negative for hematuria.   Musculoskeletal: Negative for falls.   Skin: Negative for rash.   Neurological: Positive for speech change and focal weakness. Negative for sensory change and seizures.        Physical Exam  Pulse:  [53-75] 66  Resp:  [16-20] 16  BP: ()/(57-76) 114/76  SpO2:  [91 %-96 %] 91 %    Physical Exam  Vitals and nursing note reviewed.   Constitutional:       General: He is not in acute distress.     Appearance: He is obese. He is not ill-appearing, toxic-appearing or diaphoretic.   HENT:      Head: Normocephalic and atraumatic.      Nose: Nose normal.      Mouth/Throat:      Mouth: Mucous membranes are moist.      Pharynx: Oropharynx is clear.   Eyes:      Extraocular Movements: Extraocular movements intact.      Comments: Right pupil reactive, left pupil did not constrict given blindness which is chronic problem  L eye conjunctiva erythematous   Neck:      Thyroid:  Thyroid mass present. No thyroid tenderness.   Cardiovascular:      Rate and Rhythm: Normal rate and regular rhythm.      Heart sounds: No murmur heard.    No friction rub. No gallop.   Pulmonary:      Effort: Pulmonary effort is normal.      Breath sounds: Normal breath sounds.   Abdominal:      General: Bowel sounds are normal. There is no distension.      Palpations: Abdomen is soft.      Tenderness: There is no abdominal tenderness.   Musculoskeletal:      Right lower leg: No edema.      Left lower leg: No edema.   Skin:     Coloration: Skin is not jaundiced.      Findings: No rash.   Neurological:      Mental Status: He is alert and oriented to person, place, and time.      Cranial Nerves: No cranial nerve deficit.      Sensory: No sensory deficit.      Motor: Weakness (R shoulder weakness, able to turn neck to the left but wouldn't turn it to the right, R arm/hand paresis, R leg weakness however when trying to perform babinski on R foot patient was able to move entire leg away from pen) present.   Psychiatric:         Mood and Affect: Mood normal.         Behavior: Behavior normal.         Fluids  No intake or output data in the 24 hours ending 02/27/22 1200    Laboratory  Recent Labs     02/26/22 1952 02/27/22  0513   WBC 9.7 8.5   RBC 4.75 4.59*   HEMOGLOBIN 13.8* 13.4*   HEMATOCRIT 42.4 40.5*   MCV 89.3 88.2   MCH 29.1 29.2   MCHC 32.5* 33.1*   RDW 45.6 45.4   PLATELETCT 289 229   MPV 10.0 9.7     Recent Labs     02/26/22 1952 02/27/22  0513   SODIUM 136 135   POTASSIUM 4.3 4.0   CHLORIDE 102 102   CO2 21 22   GLUCOSE 131* 129*   BUN 16 15   CREATININE 0.96 0.96   CALCIUM 9.1 8.7     Recent Labs     02/26/22 1952   APTT 29.5   INR 0.96               Imaging  DX-CHEST-PORTABLE (1 VIEW)   Final Result      No acute cardiopulmonary abnormality.      CT-CTA NECK WITH & W/O-POST PROCESSING   Final Result      1.  No occlusion or hemodynamically significant stenosis of the neck arteries.   2.  Redemonstrated  enhancing masses in the anterior neck, possibly ectopic thyroid tissue.      CT-CTA HEAD WITH & W/O-POST PROCESS   Final Result      CT angiogram of the [place] within normal limits.      CT-CEREBRAL PERFUSION ANALYSIS   Final Result      1.  Cerebral blood flow less than 30% likely representing completed infarct = 0 mL.      2.  T Max more than 6 seconds likely representing combination of completed infarct and ischemia = 0 mL.      3.  Mismatched volume likely representing ischemic brain/penumbra = None      4.  Please note that the cerebral perfusion was performed on the limited brain tissue around the basal ganglia region. Infarct/ischemia outside the CT perfusion sections can be missed in this study.      CT-HEAD W/O   Final Result      1.  Confluent cerebral white matter hypodensities again noted which may represent severe chronic leukoencephalopathy..   2.  No acute intracranial abnormality.              Assessment/Plan  * Acute right-sided weakness- (present on admission)  Assessment & Plan  Chronic recurrent issue, per our records he has had 19 MRI head since 2011 with the majority ordered for right-sided weakness  CT scans in the ED negative for acute abnormality, chronic moderate cerebellar loss  Patient says this is similar to his previous episodes and symptoms usually resolve in a few days or less  On exam 0/5 strength, however while talking to the patient his right toe and foot do move  Neurology evaluated in the ED and suggest possible conversion disorder  IP psych consult placed, day team to contact psych  Patient reports it took ~ 1 week for weakness to improve after last episode    Conversion disorder- (present on admission)  Assessment & Plan  Recurrent right-sided weakness, admitted again for this  Psych consult placed     Postoperative hypothyroidism- (present on admission)  Assessment & Plan  Continue levothyroxine  Thyroid mass, h/o goiter  CT neck shows thyroid mass  TSH      Primary  hypertension- (present on admission)  Assessment & Plan  Continue lisinopril    Mixed hyperlipidemia- (present on admission)  Assessment & Plan  Continue atorvastatin    Class 1 obesity due to excess calories with serious comorbidity and body mass index (BMI) of 32.0 to 32.9 in adult- (present on admission)  Assessment & Plan  BMI 32.7    Type 2 diabetes mellitus without complication, without long-term current use of insulin (HCC)- (present on admission)  Assessment & Plan  Hold home orals.  Continue insulin glargine  Insulin sliding scale hypoglycemia protocol in the hospital    PTSD (post-traumatic stress disorder)- (present on admission)  Assessment & Plan  Continue sertraline and prazosin    History of seizure- (present on admission)  Assessment & Plan  Continue divalproex    Anxiety and depression- (present on admission)  Assessment & Plan  Continue sertraline    Asthma- (present on admission)  Assessment & Plan  Continue montelukast and home inhaler       VTE prophylaxis: enoxaparin ppx    I have performed a physical exam and reviewed and updated ROS and Plan today (2/27/2022). In review of yesterday's note (2/26/2022), there are no changes except as documented above.

## 2022-02-27 NOTE — ED NOTES
Med rec completed per MAR from patient's living facility, [facility]  Allergies reviewed with patient  No PO Antibiotics in the last 30 days

## 2022-02-27 NOTE — ASSESSMENT & PLAN NOTE
Hold home orals.  Continue insulin glargine  Insulin sliding scale hypoglycemia protocol in the hospital

## 2022-02-27 NOTE — ED NOTES
Pt was placed on 2L NC while sleeping. Pt shows no other s/s of distress. Will continue to monitor.

## 2022-02-27 NOTE — ED PROVIDER NOTES
ED Provider Note    Scribed for Torey Mcmanus M.D. by Osvaldo Turcios. 2022, 7:55 PM.    Primary care provider: NICHOL Stern  Means of arrival: EMS  History obtained from: EMS  History limited by: Patient difficulties with speech    CHIEF COMPLAINT  Chief Complaint   Patient presents with   • Possible Stroke     LKW 1900. Patient states sudden onset right sided weakness. Patient has no effort against gravity for R arm or R leg. Facial droop noted. Patient has stuttered speech and endorses HYDE       HPI  Norm Prabhakar is a 41 y.o. male who presents to the Emergency Department via EMS for evaluation of stroke like symptoms. EMS states the patient was last seen normal at about 6:00 PM. Patient denies any history of strokes and isn't sure if he is taking blood thinners. EMS states the patient has a history of hypertension and diabetes. EMS states the patient has associated symptoms of difficulty speaking and facial droop.  Right-sided weakness,    Further information limited secondary to patient difficulties with speech    REVIEW OF SYSTEMS  Pertinent positives include facial droop and difficulties with speech. All other systems negative.    Further information limited secondary to patient difficulties with speech    C    PAST MEDICAL HISTORY   has a past medical history of Anxiety, ASTHMA, Bipolar 1 disorder (HCC), Depression, Diabetes (), Fall, Glaucoma, Glaucoma (), Hypothyroidism, Indigestion, Mental disorder, Murmur, Pneumonia, Psychiatric problem (), S/P thyroidectomy, Seizure (HCC) (), Seizure disorder ([facility]), and Unspecified disorder of thyroid.    SURGICAL HISTORY   has a past surgical history that includes eye surgery; thyroid lobectomy; and other.    SOCIAL HISTORY  Social History     Tobacco Use   • Smoking status: Former Smoker     Packs/day: 0.25     Types: Cigarettes, Cigars     Quit date: 2020     Years since quittin.9   • Smokeless tobacco: Never Used   •  "Tobacco comment: 3 cigarettes a day   Vaping Use   • Vaping Use: Former   Substance Use Topics   • Alcohol use: Not Currently   • Drug use: Yes     Types: Inhaled     Comment: marijuana      Social History     Substance and Sexual Activity   Drug Use Yes   • Types: Inhaled    Comment: marijuana       FAMILY HISTORY  Family History   Problem Relation Age of Onset   • Hypertension Mother    • Heart Disease Mother    • Lung Disease Mother    • Stroke Maternal Grandmother        CURRENT MEDICATIONS  Home Medications    None noted when reviewed         ALLERGIES  Allergies   Allergen Reactions   • Abilify      \"Feeling tired, like I don't even know whats going on around me\"   • Fish      Pt reports fish causes him to be sick to his stomach  Not listed on MAR noted 2/3/2021     • Geodon [Ziprasidone Hcl] Anaphylaxis     Anaphylaxis per patient   • Aripiprazole      \"I became lethargic.\"   • Ziprasidone        PHYSICAL EXAM  VITAL SIGNS: /72   Pulse 69   Resp 20   Ht 1.981 m (6' 6\")   Wt (!) 128 kg (283 lb)   SpO2 94%   BMI 32.70 kg/m²     Constitutional: Well developed, Well nourished, moderate distress.   HENT: Normocephalic, Atraumatic, mask in place.  Eyes: Left eye blindness, Conjunctiva normal, No discharge.  Extraocular motion intact  Cardiovascular: Normal heart rate, Normal rhythm, No murmurs, equal pulses.   Pulmonary: Normal breath sounds, No respiratory distress, No wheezing, No rales, No rhonchi.  Abdomen:Soft, No tenderness, No masses, no rebound, no guarding.   Musculoskeletal: No major deformities noted, No tenderness.   Skin: Warm, Dry, No erythema, No rash.   Neurologic: Right sided facial droop, No effort against gravity on right leg and arm, Stuttering speech with slight slurring NIH score of 12   psychiatric: Affect normal, Judgment normal, Mood normal.     LABS  Results for orders placed or performed during the hospital encounter of 02/26/22   CBC WITH DIFFERENTIAL   Result Value Ref Range "    WBC 9.7 4.8 - 10.8 K/uL    RBC 4.75 4.70 - 6.10 M/uL    Hemoglobin 13.8 (L) 14.0 - 18.0 g/dL    Hematocrit 42.4 42.0 - 52.0 %    MCV 89.3 81.4 - 97.8 fL    MCH 29.1 27.0 - 33.0 pg    MCHC 32.5 (L) 33.7 - 35.3 g/dL    RDW 45.6 35.9 - 50.0 fL    Platelet Count 289 164 - 446 K/uL    MPV 10.0 9.0 - 12.9 fL    Neutrophils-Polys 50.60 44.00 - 72.00 %    Lymphocytes 37.20 22.00 - 41.00 %    Monocytes 8.90 0.00 - 13.40 %    Eosinophils 1.20 0.00 - 6.90 %    Basophils 0.70 0.00 - 1.80 %    Immature Granulocytes 1.40 (H) 0.00 - 0.90 %    Nucleated RBC 0.00 /100 WBC    Neutrophils (Absolute) 4.90 1.82 - 7.42 K/uL    Lymphs (Absolute) 3.61 1.00 - 4.80 K/uL    Monos (Absolute) 0.86 (H) 0.00 - 0.85 K/uL    Eos (Absolute) 0.12 0.00 - 0.51 K/uL    Baso (Absolute) 0.07 0.00 - 0.12 K/uL    Immature Granulocytes (abs) 0.14 (H) 0.00 - 0.11 K/uL    NRBC (Absolute) 0.00 K/uL   COMP METABOLIC PANEL   Result Value Ref Range    Sodium 136 135 - 145 mmol/L    Potassium 4.3 3.6 - 5.5 mmol/L    Chloride 102 96 - 112 mmol/L    Co2 21 20 - 33 mmol/L    Anion Gap 13.0 7.0 - 16.0    Glucose 131 (H) 65 - 99 mg/dL    Bun 16 8 - 22 mg/dL    Creatinine 0.96 0.50 - 1.40 mg/dL    Calcium 9.1 8.5 - 10.5 mg/dL    AST(SGOT) 15 12 - 45 U/L    ALT(SGPT) 11 2 - 50 U/L    Alkaline Phosphatase 47 30 - 99 U/L    Total Bilirubin 0.3 0.1 - 1.5 mg/dL    Albumin 4.3 3.2 - 4.9 g/dL    Total Protein 6.5 6.0 - 8.2 g/dL    Globulin 2.2 1.9 - 3.5 g/dL    A-G Ratio 2.0 g/dL   PROTHROMBIN TIME   Result Value Ref Range    PT 12.5 12.0 - 14.6 sec    INR 0.96 0.87 - 1.13   APTT   Result Value Ref Range    APTT 29.5 24.7 - 36.0 sec   COD (ADULT)   Result Value Ref Range    ABO Grouping Only A     Rh Grouping Only POS     Antibody Screen-Cod NEG    TROPONIN   Result Value Ref Range    Troponin T 14 6 - 19 ng/L   ESTIMATED GFR   Result Value Ref Range    GFR If African American >60 >60 mL/min/1.73 m 2    GFR If Non African American >60 >60 mL/min/1.73 m 2      All labs  reviewed by me.    EKG  12 Lead EKG interpreted by me as seen above.    RADIOLOGY  DX-CHEST-PORTABLE (1 VIEW)   Final Result      No acute cardiopulmonary abnormality.      CT-CTA NECK WITH & W/O-POST PROCESSING   Final Result      1.  No occlusion or hemodynamically significant stenosis of the neck arteries.   2.  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      CT-CTA HEAD WITH & W/O-POST PROCESS   Final Result      CT angiogram of the [place] within normal limits.      CT-CEREBRAL PERFUSION ANALYSIS   Final Result      1.  Cerebral blood flow less than 30% likely representing completed infarct = 0 mL.      2.  T Max more than 6 seconds likely representing combination of completed infarct and ischemia = 0 mL.      3.  Mismatched volume likely representing ischemic brain/penumbra = None      4.  Please note that the cerebral perfusion was performed on the limited brain tissue around the basal ganglia region. Infarct/ischemia outside the CT perfusion sections can be missed in this study.      CT-HEAD W/O   Final Result      1.  Confluent cerebral white matter hypodensities again noted which may represent severe chronic leukoencephalopathy..   2.  No acute intracranial abnormality.           The radiologist's interpretation of all radiological studies have been reviewed by me.    COURSE & MEDICAL DECISION MAKING  Pertinent Labs & Imaging studies reviewed. (See chart for details)    I reviewed previous medical records which indicated that the patient had previously symmetric facial expression. In February, March, and August 2021, the patient had a similar presentation and all of which had negative MRIs.    7:55 PM - Patient seen and examined at bedside. Ordered Valproic acid to evaluate his symptoms. The differential diagnoses include but are not limited to: Stroke, seizure, Santosh's paralysis.     8:13 PM - Initial CT scans are negative for stroke. Paged  neurology.    8:19 PM - I discussed the patient's case and the above findings with Dr. Harris (Neurology) who recommends not doing a streptokinase given the patient has had a similar presentation with negative MRI findings x2. He recommended holding the patient for observations.     CRITICAL CARE  I provided critical care services, which included medication orders, frequent reevaluations of the patient's condition and response to treatment, ordering and reviewing test results, and discussing the case with various consultants.  The critical care time associated with the care of the patient was 30 minutes. Review chart for interventions. This time is exclusive of any other billable procedures.        Medical Decision Making: At this point time patient presents with acute paralysis of the right side as well as stuttering speech.  He has had similar presentations on 3 previous episodes and it is thought that this is likely conversion disorder.  But given the symptoms and the significant disability he is currently having we will hospitalize the patient for further evaluation and PT OT evaluation.  He likely will also need psychiatric assessment.    DISPOSITION:  Patient will be hospitalized by Dr. Patel in guarded condition.      FINAL IMPRESSION  1. Right sided weakness    2. Stuttering          Osvaldo ROB (Stephanie), am scribing for, and in the presence of, Torey Mcmanus M.D.    Electronically signed by: Osvaldo Turcios (Stephanie), 2/26/2022    Torey ROB M.D. personally performed the services described in this documentation, as scribed by Osvaldo Turcios in my presence, and it is both accurate and complete.    The note accurately reflects work and decisions made by me.  Torey Mcmanus M.D.  2/26/2022  9:53 PM

## 2022-02-28 LAB
GLUCOSE BLD STRIP.AUTO-MCNC: 127 MG/DL (ref 65–99)
GLUCOSE BLD STRIP.AUTO-MCNC: 146 MG/DL (ref 65–99)
GLUCOSE BLD STRIP.AUTO-MCNC: 160 MG/DL (ref 65–99)
GLUCOSE BLD STRIP.AUTO-MCNC: 170 MG/DL (ref 65–99)
GLUCOSE BLD STRIP.AUTO-MCNC: 233 MG/DL (ref 65–99)

## 2022-02-28 PROCEDURE — 96372 THER/PROPH/DIAG INJ SC/IM: CPT

## 2022-02-28 PROCEDURE — G0378 HOSPITAL OBSERVATION PER HR: HCPCS

## 2022-02-28 PROCEDURE — 700102 HCHG RX REV CODE 250 W/ 637 OVERRIDE(OP): Performed by: STUDENT IN AN ORGANIZED HEALTH CARE EDUCATION/TRAINING PROGRAM

## 2022-02-28 PROCEDURE — 97161 PT EVAL LOW COMPLEX 20 MIN: CPT

## 2022-02-28 PROCEDURE — 97166 OT EVAL MOD COMPLEX 45 MIN: CPT

## 2022-02-28 PROCEDURE — 700111 HCHG RX REV CODE 636 W/ 250 OVERRIDE (IP): Performed by: STUDENT IN AN ORGANIZED HEALTH CARE EDUCATION/TRAINING PROGRAM

## 2022-02-28 PROCEDURE — A9270 NON-COVERED ITEM OR SERVICE: HCPCS | Performed by: INTERNAL MEDICINE

## 2022-02-28 PROCEDURE — A9270 NON-COVERED ITEM OR SERVICE: HCPCS | Performed by: STUDENT IN AN ORGANIZED HEALTH CARE EDUCATION/TRAINING PROGRAM

## 2022-02-28 PROCEDURE — 99225 PR SUBSEQUENT OBSERVATION CARE,LEVEL II: CPT | Performed by: INTERNAL MEDICINE

## 2022-02-28 PROCEDURE — 82962 GLUCOSE BLOOD TEST: CPT

## 2022-02-28 PROCEDURE — 700102 HCHG RX REV CODE 250 W/ 637 OVERRIDE(OP): Performed by: INTERNAL MEDICINE

## 2022-02-28 RX ADMIN — SERTRALINE 150 MG: 50 TABLET, FILM COATED ORAL at 20:52

## 2022-02-28 RX ADMIN — LEVOTHYROXINE SODIUM 225 MCG: 0.07 TABLET ORAL at 04:44

## 2022-02-28 RX ADMIN — SENNOSIDES AND DOCUSATE SODIUM 2 TABLET: 50; 8.6 TABLET ORAL at 04:46

## 2022-02-28 RX ADMIN — INSULIN HUMAN 2 UNITS: 100 INJECTION, SOLUTION PARENTERAL at 12:07

## 2022-02-28 RX ADMIN — DIVALPROEX SODIUM 1500 MG: 500 TABLET, DELAYED RELEASE ORAL at 20:51

## 2022-02-28 RX ADMIN — ACETAMINOPHEN 1000 MG: 500 TABLET ORAL at 04:43

## 2022-02-28 RX ADMIN — LISINOPRIL 10 MG: 10 TABLET ORAL at 04:44

## 2022-02-28 RX ADMIN — MONTELUKAST 10 MG: 10 TABLET, FILM COATED ORAL at 17:32

## 2022-02-28 RX ADMIN — BUDESONIDE AND FORMOTEROL FUMARATE DIHYDRATE 2 PUFF: 160; 4.5 AEROSOL RESPIRATORY (INHALATION) at 06:28

## 2022-02-28 RX ADMIN — INSULIN HUMAN 2 UNITS: 100 INJECTION, SOLUTION PARENTERAL at 08:33

## 2022-02-28 RX ADMIN — INSULIN GLARGINE 30 UNITS: 100 INJECTION, SOLUTION SUBCUTANEOUS at 17:36

## 2022-02-28 RX ADMIN — ACETAMINOPHEN 1000 MG: 500 TABLET ORAL at 14:17

## 2022-02-28 RX ADMIN — ENOXAPARIN SODIUM 40 MG: 40 INJECTION SUBCUTANEOUS at 04:46

## 2022-02-28 RX ADMIN — LURASIDONE HYDROCHLORIDE 20 MG: 20 TABLET, FILM COATED ORAL at 17:33

## 2022-02-28 RX ADMIN — ACETAMINOPHEN 1000 MG: 500 TABLET ORAL at 20:47

## 2022-02-28 RX ADMIN — INSULIN GLARGINE 30 UNITS: 100 INJECTION, SOLUTION SUBCUTANEOUS at 06:31

## 2022-02-28 RX ADMIN — BUDESONIDE AND FORMOTEROL FUMARATE DIHYDRATE 2 PUFF: 160; 4.5 AEROSOL RESPIRATORY (INHALATION) at 17:33

## 2022-02-28 RX ADMIN — ATORVASTATIN CALCIUM 20 MG: 20 TABLET, FILM COATED ORAL at 17:33

## 2022-02-28 RX ADMIN — ACETAMINOPHEN 1000 MG: 500 TABLET ORAL at 00:44

## 2022-02-28 RX ADMIN — INSULIN HUMAN 3 UNITS: 100 INJECTION, SOLUTION PARENTERAL at 20:57

## 2022-02-28 RX ADMIN — PRAZOSIN HYDROCHLORIDE 4 MG: 2 CAPSULE ORAL at 17:32

## 2022-02-28 ASSESSMENT — PAIN DESCRIPTION - PAIN TYPE
TYPE: ACUTE PAIN
TYPE: ACUTE PAIN

## 2022-02-28 ASSESSMENT — ACTIVITIES OF DAILY LIVING (ADL): TOILETING: INDEPENDENT

## 2022-02-28 ASSESSMENT — ENCOUNTER SYMPTOMS
ABDOMINAL PAIN: 0
CHILLS: 0
COUGH: 0
FALLS: 0
SENSORY CHANGE: 0
SHORTNESS OF BREATH: 0
FEVER: 0
NAUSEA: 0
SEIZURES: 0
BLURRED VISION: 1
VOMITING: 0
FOCAL WEAKNESS: 1
SORE THROAT: 1
SPEECH CHANGE: 1

## 2022-02-28 ASSESSMENT — COGNITIVE AND FUNCTIONAL STATUS - GENERAL
EATING MEALS: A LITTLE
DAILY ACTIVITIY SCORE: 15
HELP NEEDED FOR BATHING: A LOT
TURNING FROM BACK TO SIDE WHILE IN FLAT BAD: UNABLE
SUGGESTED CMS G CODE MODIFIER MOBILITY: CM
CLIMB 3 TO 5 STEPS WITH RAILING: TOTAL
PERSONAL GROOMING: A LITTLE
SUGGESTED CMS G CODE MODIFIER DAILY ACTIVITY: CK
MOVING TO AND FROM BED TO CHAIR: UNABLE
MOBILITY SCORE: 7
DRESSING REGULAR UPPER BODY CLOTHING: A LITTLE
DRESSING REGULAR LOWER BODY CLOTHING: A LOT
WALKING IN HOSPITAL ROOM: TOTAL
MOVING FROM LYING ON BACK TO SITTING ON SIDE OF FLAT BED: UNABLE
TOILETING: A LOT
STANDING UP FROM CHAIR USING ARMS: A LOT

## 2022-02-28 ASSESSMENT — GAIT ASSESSMENTS: GAIT LEVEL OF ASSIST: UNABLE TO PARTICIPATE

## 2022-02-28 NOTE — CARE PLAN
The patient is Stable - Low risk of patient condition declining or worsening    Shift Goals  Clinical Goals: Stable neuro, maintain skin integrity  Patient Goals: Sleep  Family Goals: JUANCARLOS    Progress made toward(s) clinical / shift goals:    Problem: Knowledge Deficit - Standard  Goal: Patient and family/care givers will demonstrate understanding of plan of care, disease process/condition, diagnostic tests and medications  Outcome: Progressing   The patient received education reinforcement regarding plan of care, condition, and medications.      Problem: Fall Risk  Goal: Patient will remain free from falls  Outcome: Progressing   Appropriate fall precautions are in place. Bed alarm is on at all times. Staff present for bathroom needs.     Patient is not progressing towards the following goals:

## 2022-02-28 NOTE — THERAPY
Physical Therapy   Daily Treatment     Patient Name: Norm Prabhakar  Age:  41 y.o., Sex:  male  Medical Record #: [id number]  Today's Date: 2/28/2022     Precautions  Precautions: (P) Fall Risk    Assessment    Pt with admitting diagnosis of acute right sided weakness of UE and LE and speech changes. PMHx includes previous episodes of acute R sided weakness with insidious onset and negative imaging which typically resolves in approximately one week. PMHx also includes DM, asthma, bipolar disorder, hypothyroidism, Depression, anxiety, seizures, HTN.     Pt is pleasant and cooperative regarding PT evaluation; demonstrates no active ROM of R UE and LE, but does demonstrate occasional use of R arm to prop body in bed while sitting. Pt with no pain during evaluation. Pt is limited primarily by strength deficit in R UE/LE and resulting decline in functional standing balance and ability to stand/transfer/ambulate. Pt will benefit from continued PT services in acute setting, as well as in post acute setting.         Plan    Continue 3x/week until goals met.    DC Equipment Recommendations: (P) Unable to determine at this time  Discharge Recommendations: (P) Recommend post-acute placement for additional physical therapy services prior to discharge home              02/28/22 0920   Precautions   Precautions Fall Risk   Pain 0 - 10 Group   Therapist Pain Assessment 0;Post Activity Pain Same as Prior to Activity   Prior Living Situation   Prior Services Intermittent Physical Support for ADL Per Service   Housing / Facility Extended Care Facility;1 Story Apartment / Condo   Steps Into Home 15   Steps In Home 0   Bathroom Set up Bathtub / Shower Combination;Grab Bars   Lives with - Patient's Self Care Capacity Alone and Able to Care For Self  (pt reports he gets assistance for medication and transportation)   Prior Level of Functional Mobility   Bed Mobility Independent   Transfer Status Independent   Ambulation Independent    Distance Ambulation (Feet)   (community)   Assistive Devices Used None   History of Falls   History of Falls No   Cognition    Level of Consciousness Alert   Strength Lower Body   Lower Body Strength  X   Comments pt demonstrates no AROM/mobility in R LE, however when LE is mobilized there is slight resistance to PROM   Balance Assessment   Sitting Balance (Static) Fair   Sitting Balance (Dynamic) Fair -   Standing Balance (Static) Poor   Standing Balance (Dynamic) Poor -   Weight Shift Sitting Fair   Weight Shift Standing Poor   Gait Analysis   Gait Level Of Assist Unable to Participate   Bed Mobility    Supine to Sit Minimal Assist   Scooting Contact Guard Assist   Functional Mobility   Sit to Stand Moderate Assist   Bed, Chair, Wheelchair Transfer Moderate Assist   Transfer Method Stand Pivot   Mobility supine to sit EOB, stand pivot transfer to chair   How much difficulty does the patient currently have...   Turning over in bed (including adjusting bedclothes, sheets and blankets)? 1   Sitting down on and standing up from a chair with arms (e.g., wheelchair, bedside commode, etc.) 1   Moving from lying on back to sitting on the side of the bed? 1   How much help from another person does the patient currently need...   Moving to and from a bed to a chair (including a wheelchair)? 2   Need to walk in a hospital room? 1   Climbing 3-5 steps with a railing? 1   6 clicks Mobility Score 7   Activity Tolerance   Sitting in Chair left seated in chair   Sitting Edge of Bed 5 min   Standing 30 seconds   Short Term Goals    Short Term Goal # 1 supine to/sit EOB with mod I in 6 visits   Short Term Goal # 2 sit to stand from bed/chair/toilet with SBA in 6 visits   Short Term Goal # 3 ambulation with LRD 50 feet in 6 visits   Education Group   Education Provided Role of Physical Therapist;Gait Training   Role of Physical Therapist Patient Response Patient;Acceptance;Explanation;Verbal Demonstration   Problem List     Problems Impaired Bed Mobility;Impaired Transfers;Impaired Ambulation;Functional ROM Deficit;Functional Strength Deficit;Impaired Balance;Decreased Activity Tolerance   Anticipated Discharge Equipment and Recommendations   DC Equipment Recommendations Unable to determine at this time   Discharge Recommendations Recommend post-acute placement for additional physical therapy services prior to discharge home       Lola Mcrae DPT

## 2022-02-28 NOTE — PROGRESS NOTES
4 Eyes Skin Assessment Completed by OMID Corona and OMID Prieto.    Head WDL  Ears WDL  Nose WDL  Mouth WDL  Neck WDL  Breast/Chest WDL  Shoulder Blades WDL  Spine WDL  (R) Arm/Elbow/Hand WDL  (L) Arm/Elbow/Hand WDL  Abdomen WDL  Groin WDL  Scrotum/Coccyx/Buttocks Redness and Blanching  (R) Leg Scab and Bruising  (L) Leg Scab and Bruising  (R) Heel/Foot/Toe WDL, dry, flaky  (L) Heel/Foot/Toe WDL, dry, flaky          Devices In Places Blood Pressure Cuff and SCD's      Interventions In Place Pillows and Pressure Redistribution Mattress    Possible Skin Injury No    Pictures Uploaded Into Epic N/A  Wound Consult Placed N/A  RN Wound Prevention Protocol Ordered No

## 2022-02-28 NOTE — DISCHARGE PLANNING
BOUCHRA spoke with Rafael of [facility] [phone] and informed her the patient has d/c orders.  Per Rafael, she will need to come to the hospital and do an evaluation before accepting him back into their housing. Rafael states she will be here at 130pm.

## 2022-02-28 NOTE — CONSULTS
BRIEF PSYCHIATRIC CONSULT NOTE:    -Legal Hold:none  This pt is well known to psychiatry and there are many evaluations in the chart. 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 Unable to see him today, if he is kept overnight, will see him tomorrow. However, if appropriate he can follow up with outpt mental health provider tomorrow. Discussed/voalted WILMER Syed MD

## 2022-02-28 NOTE — CARE PLAN
The patient is Stable - Low risk of patient condition declining or worsening    Shift Goals  Clinical Goals: monitor neuro status; safe mobilization  Patient Goals: rest  Family Goals: JUANCARLOS    Progress made toward(s) clinical / shift goals:    Problem: Knowledge Deficit - Standard  Goal: Patient and family/care givers will demonstrate understanding of plan of care, disease process/condition, diagnostic tests and medications  Outcome: Progressing     Problem: Fall Risk  Goal: Patient will remain free from falls  Outcome: Progressing     Problem: Skin Integrity  Goal: Skin integrity is maintained or improved  Outcome: Progressing   Encouraged up to chair and increased mobilization, pt tolerated well.     Patient is not progressing towards the following goals:

## 2022-02-28 NOTE — PROGRESS NOTES
Assumed care of patient at bedside report from NOC RN. Updated on POC. Patient currently A & O x 4; on room air; up x1-2 assist; without complaints of acute pain. Assessment completed. Call light within reach. Whiteboard updated. Fall precautions in place. Bed locked and in lowest position. All questions answered. No other needs indicated at this time.

## 2022-02-28 NOTE — THERAPY
"Occupational Therapy   Initial Evaluation     Patient Name: Norm Prabhakar  Age:  41 y.o., Sex:  male  Medical Record #: [id number]  Today's Date: 2/28/2022     Precautions  Precautions: (P) Fall Risk    Assessment  Patient is 41 y.o. male admitted for acute right sided weakness and speech changes, pt with history of episodes acute right sided weakness with negative imaging and issues resolve in roughly ~1 week. Pt normally independent with ADLs and functional mobility living in a 2nd floor apartment at [facility]. Pt presents with RUE weakness pt unable to demonstrate active movement despite ability to prop self up at EOB with RUE, pt required Jero for functional mobility, modA for transfer to bedside chair, maxA for lower body ADLs. Pt will continue to benefit from skilled therapy while admitted as well as recommend post-acute placement.    Plan    Recommend Occupational Therapy 3 times per week until therapy goals are met for the following treatments:  Adaptive Equipment, Neuro Re-Education / Balance, Self Care/Activities of Daily Living, Therapeutic Activities, and Therapeutic Exercises.    Subjective    \"I remember they sent me to rehab for this one time\"     Objective       02/28/22 0925   Prior Living Situation   Prior Services Intermittent Physical Support for ADL Per Service   Housing / Facility Extended Care Facility;1 Story Apartment / Condo   Steps Into Home 15   Steps In Home 0   Bathroom Set up Bathtub / Shower Combination;Grab Bars   Equipment Owned Grab Bar(s) In Tub / Shower   Lives with - Patient's Self Care Capacity Alone and Able to Care For Self   Comments Facility provides med management and transportation   Prior Level of ADL Function   Self Feeding Independent   Grooming / Hygiene Independent   Bathing Independent   Dressing Independent   Toileting Independent   Prior Level of IADL Function   Medication Management Requires Assist   Laundry Independent   Kitchen Mobility Independent   Finances " Requires Assist   Home Management Requires Assist   Shopping Requires Assist   Prior Level Of Mobility Independent Without Device in Community   Driving / Transportation Relatives / Others Provide Transportation   Occupation (Pre-Hospital Vocational) Not Employed   History of Falls   History of Falls No   Precautions   Precautions Fall Risk   Pain 0 - 10 Group   Therapist Pain Assessment Post Activity Pain Same as Prior to Activity;Nurse Notified   Cognition    Cognition / Consciousness X   Level of Consciousness Alert   Comments Self-limiting, inconsistent manual muscle testing   Passive ROM Upper Body   Passive ROM Upper Body WDL   Active ROM Upper Body   Active ROM Upper Body  X   Dominant Hand Right   Flaccid Upper Extremity Right Upper Extremity Flaccid   Strength Upper Body   Upper Body Strength  X   Comments LUE WFL, RUE 0/5   Sensation Upper Body   Upper Extremity Sensation  WDL   Upper Body Muscle Tone   Upper Body Muscle Tone  X   Rt Upper Extremity Muscle Tone Hypotonic;Gross Assist   Neurological Concerns   Neurological Concerns Yes   Rt Upper Extremity Gross Motor Control Impaired   Rt Upper Extremity Functional Use Impaired   Balance Assessment   Sitting Balance (Static) Fair   Sitting Balance (Dynamic) Fair -   Standing Balance (Static) Poor   Standing Balance (Dynamic) Poor -   Weight Shift Sitting Fair   Weight Shift Standing Poor   Bed Mobility    Supine to Sit Minimal Assist   Scooting Contact Guard Assist   ADL Assessment   Eating Supervision   Grooming Supervision;Seated   Upper Body Dressing Minimal Assist   Lower Body Dressing Maximal Assist   Toileting   (NT-refused)   How much help from another person does the patient currently need...   Putting on and taking off regular lower body clothing? 2   Bathing (including washing, rinsing, and drying)? 2   Toileting, which includes using a toilet, bedpan, or urinal? 2   Putting on and taking off regular upper body clothing? 3   Taking care of  personal grooming such as brushing teeth? 3   Eating meals? 3   6 Clicks Daily Activity Score 15   Modified [place] (mRS)   Modified [place] Score 4   Functional Mobility   Sit to Stand Moderate Assist   Bed, Chair, Wheelchair Transfer Moderate Assist   Transfer Method Stand Pivot   Mobility bed mobility, pivot transfer to chair   Comments w/ HHA x2   Activity Tolerance   Sitting in Chair left seated in chair   Sitting Edge of Bed 5 min   Standing 1 min   Short Term Goals   Short Term Goal # 1 Pt will complete ADL transfers with supervision   Short Term Goal # 2 Pt will complete LB dressing with supervision   Short Term Goal # 3 Pt will complete toileting with supervision   Education Group   Education Provided Role of Occupational Therapist   Role of Occupational Therapist Patient Response Patient;Acceptance;Explanation;Action Demonstration   Problem List   Problem List Decreased Active Daily Living Skills;Decreased Homemaking Skills;Decreased Upper Extremity Strength Right;Decreased Upper Extremity AROM Right;Decreased Functional Mobility;Decreased Activity Tolerance;Safety Awareness Deficits / Cognition;Impaired Cognitive Function;Impaired Upper Extremity Tone Right   Interdisciplinary Plan of Care Collaboration   IDT Collaboration with  Nursing;Physical Therapist   Patient Position at End of Therapy Seated;Chair Alarm On;Call Light within Reach;Tray Table within Reach;Phone within Reach   Collaboration Comments RN updated

## 2022-02-28 NOTE — DISCHARGE INSTRUCTIONS
Discharge Instructions    Discharged to group home by car with escort. Discharged via wheelchair, hospital escort: Yes.  Special equipment needed: Not Applicable    Be sure to schedule a follow-up appointment with your primary care doctor or any specialists as instructed.     Discharge Plan:   Influenza Vaccine Indication: Not indicated: Previously immunized this influenza season and > 8 years of age    I understand that a diet low in cholesterol, fat, and sodium is recommended for good health. Unless I have been given specific instructions below for another diet, I accept this instruction as my diet prescription.   Other diet:     Special Instructions: None    · Is patient discharged on Warfarin / Coumadin?   No     Depression / Suicide Risk    As you are discharged from this [facility] facility, it is important to learn how to keep safe from harming yourself.    Recognize the warning signs:  · Abrupt changes in personality, positive or negative- including increase in energy   · Giving away possessions  · Change in eating patterns- significant weight changes-  positive or negative  · Change in sleeping patterns- unable to sleep or sleeping all the time   · Unwillingness or inability to communicate  · Depression  · Unusual sadness, discouragement and loneliness  · Talk of wanting to die  · Neglect of personal appearance   · Rebelliousness- reckless behavior  · Withdrawal from people/activities they love  · Confusion- inability to concentrate     If you or a loved one observes any of these behaviors or has concerns about self-harm, here's what you can do:  · Talk about it- your feelings and reasons for harming yourself  · Remove any means that you might use to hurt yourself (examples: pills, rope, extension cords, firearm)  · Get professional help from the community (Mental Health, Substance Abuse, psychological counseling)  · Do not be alone:Call your Safe Contact- someone whom you trust who will be there for  you.  · Call your local CRISIS HOTLINE 109-8333 or 043-460-8432  · Call your local Children's Mobile Crisis Response Team [place] (217) 059-4189 or www.Cubicl  · Call the toll free National Suicide Prevention Hotlines   · National Suicide Prevention Lifeline 025-195-JHWS (7581)  · National [place] Line Network 800-SUICIDE (668-7468)

## 2022-03-01 ENCOUNTER — PATIENT OUTREACH (OUTPATIENT)
Dept: HEALTH INFORMATION MANAGEMENT | Facility: OTHER | Age: 42
End: 2022-03-01
Payer: MEDICAID

## 2022-03-01 LAB
GLUCOSE BLD STRIP.AUTO-MCNC: 172 MG/DL (ref 65–99)
GLUCOSE BLD STRIP.AUTO-MCNC: 210 MG/DL (ref 65–99)
GLUCOSE BLD STRIP.AUTO-MCNC: 220 MG/DL (ref 65–99)
GLUCOSE BLD STRIP.AUTO-MCNC: 224 MG/DL (ref 65–99)
GLUCOSE BLD STRIP.AUTO-MCNC: 253 MG/DL (ref 65–99)

## 2022-03-01 PROCEDURE — A9270 NON-COVERED ITEM OR SERVICE: HCPCS | Performed by: INTERNAL MEDICINE

## 2022-03-01 PROCEDURE — G0378 HOSPITAL OBSERVATION PER HR: HCPCS

## 2022-03-01 PROCEDURE — 700102 HCHG RX REV CODE 250 W/ 637 OVERRIDE(OP): Performed by: STUDENT IN AN ORGANIZED HEALTH CARE EDUCATION/TRAINING PROGRAM

## 2022-03-01 PROCEDURE — 96372 THER/PROPH/DIAG INJ SC/IM: CPT

## 2022-03-01 PROCEDURE — 700102 HCHG RX REV CODE 250 W/ 637 OVERRIDE(OP): Performed by: INTERNAL MEDICINE

## 2022-03-01 PROCEDURE — A9270 NON-COVERED ITEM OR SERVICE: HCPCS | Performed by: STUDENT IN AN ORGANIZED HEALTH CARE EDUCATION/TRAINING PROGRAM

## 2022-03-01 PROCEDURE — 700111 HCHG RX REV CODE 636 W/ 250 OVERRIDE (IP): Performed by: STUDENT IN AN ORGANIZED HEALTH CARE EDUCATION/TRAINING PROGRAM

## 2022-03-01 PROCEDURE — 82962 GLUCOSE BLOOD TEST: CPT | Mod: 91

## 2022-03-01 PROCEDURE — 99224 PR SUBSEQUENT OBSERVATION CARE,LEVEL I: CPT | Performed by: STUDENT IN AN ORGANIZED HEALTH CARE EDUCATION/TRAINING PROGRAM

## 2022-03-01 RX ORDER — KETOROLAC TROMETHAMINE 30 MG/ML
30 INJECTION, SOLUTION INTRAMUSCULAR; INTRAVENOUS EVERY 6 HOURS PRN
Status: ACTIVE | OUTPATIENT
Start: 2022-03-01 | End: 2022-03-02

## 2022-03-01 RX ORDER — IBUPROFEN 600 MG/1
600 TABLET ORAL 3 TIMES DAILY PRN
Status: DISCONTINUED | OUTPATIENT
Start: 2022-03-02 | End: 2022-03-20 | Stop reason: HOSPADM

## 2022-03-01 RX ADMIN — ACETAMINOPHEN 1000 MG: 500 TABLET ORAL at 20:16

## 2022-03-01 RX ADMIN — INSULIN HUMAN 3 UNITS: 100 INJECTION, SOLUTION PARENTERAL at 12:54

## 2022-03-01 RX ADMIN — MONTELUKAST 10 MG: 10 TABLET, FILM COATED ORAL at 17:25

## 2022-03-01 RX ADMIN — INSULIN GLARGINE 30 UNITS: 100 INJECTION, SOLUTION SUBCUTANEOUS at 06:37

## 2022-03-01 RX ADMIN — ENOXAPARIN SODIUM 40 MG: 40 INJECTION SUBCUTANEOUS at 06:34

## 2022-03-01 RX ADMIN — BUDESONIDE AND FORMOTEROL FUMARATE DIHYDRATE 2 PUFF: 160; 4.5 AEROSOL RESPIRATORY (INHALATION) at 06:33

## 2022-03-01 RX ADMIN — ATORVASTATIN CALCIUM 20 MG: 20 TABLET, FILM COATED ORAL at 17:25

## 2022-03-01 RX ADMIN — INSULIN HUMAN 3 UNITS: 100 INJECTION, SOLUTION PARENTERAL at 17:18

## 2022-03-01 RX ADMIN — DIVALPROEX SODIUM 1500 MG: 500 TABLET, DELAYED RELEASE ORAL at 20:18

## 2022-03-01 RX ADMIN — ACETAMINOPHEN 1000 MG: 500 TABLET ORAL at 12:57

## 2022-03-01 RX ADMIN — SENNOSIDES AND DOCUSATE SODIUM 2 TABLET: 50; 8.6 TABLET ORAL at 06:32

## 2022-03-01 RX ADMIN — INSULIN HUMAN 3 UNITS: 100 INJECTION, SOLUTION PARENTERAL at 08:08

## 2022-03-01 RX ADMIN — ACETAMINOPHEN 1000 MG: 500 TABLET ORAL at 08:03

## 2022-03-01 RX ADMIN — BUDESONIDE AND FORMOTEROL FUMARATE DIHYDRATE 2 PUFF: 160; 4.5 AEROSOL RESPIRATORY (INHALATION) at 17:25

## 2022-03-01 RX ADMIN — PRAZOSIN HYDROCHLORIDE 4 MG: 2 CAPSULE ORAL at 17:26

## 2022-03-01 RX ADMIN — LISINOPRIL 10 MG: 10 TABLET ORAL at 06:31

## 2022-03-01 RX ADMIN — INSULIN HUMAN 5 UNITS: 100 INJECTION, SOLUTION PARENTERAL at 20:21

## 2022-03-01 RX ADMIN — SERTRALINE 150 MG: 50 TABLET, FILM COATED ORAL at 20:17

## 2022-03-01 RX ADMIN — LEVOTHYROXINE SODIUM 225 MCG: 0.07 TABLET ORAL at 06:31

## 2022-03-01 RX ADMIN — INSULIN GLARGINE 30 UNITS: 100 INJECTION, SOLUTION SUBCUTANEOUS at 17:19

## 2022-03-01 RX ADMIN — LURASIDONE HYDROCHLORIDE 20 MG: 20 TABLET, FILM COATED ORAL at 17:30

## 2022-03-01 RX ADMIN — ACETAMINOPHEN 1000 MG: 500 TABLET ORAL at 01:43

## 2022-03-01 ASSESSMENT — ENCOUNTER SYMPTOMS
NAUSEA: 0
FALLS: 0
COUGH: 0
SEIZURES: 0
SENSORY CHANGE: 0
FOCAL WEAKNESS: 1
SHORTNESS OF BREATH: 0
CHILLS: 0
FEVER: 0
BLURRED VISION: 1
ABDOMINAL PAIN: 0
VOMITING: 0
SORE THROAT: 1
SPEECH CHANGE: 1

## 2022-03-01 ASSESSMENT — PAIN DESCRIPTION - PAIN TYPE
TYPE: ACUTE PAIN
TYPE: ACUTE PAIN

## 2022-03-01 NOTE — DISCHARGE PLANNING
Received Choice form at [phone]  Agency/Facility Name: [facility]  Referral sent per Choice form @ [phone]

## 2022-03-01 NOTE — CARE PLAN
The patient is Stable - Low risk of patient condition declining or worsening    Shift Goals  Clinical Goals: Monitor neuro status  Patient Goals: Sleep  Family Goals: JUANCARLOS    Progress made toward(s) clinical / shift goals:    Problem: Knowledge Deficit - Standard  Goal: Patient and family/care givers will demonstrate understanding of plan of care, disease process/condition, diagnostic tests and medications  Outcome: Progressing   The patient received education reinforcement regarding plan of care, condition, and medications.    Problem: Fall Risk  Goal: Patient will remain free from falls  Outcome: Progressing   Appropriate fall precautions are in place. Bed alarm is on at all times. Staff present for bathroom needs.   Problem: Pain - Standard  Goal: Alleviation of pain or a reduction in pain to the patient’s comfort goal  Outcome: Progressing   Patient verbalizes pain level and comfort goals. No pain reported at this time.      Patient is not progressing towards the following goals:

## 2022-03-01 NOTE — CONSULTS
"[facility] PSYCHIATRIC CONSULT:Intake  Reason for admission:    LKW 1900. Patient states sudden onset right sided weakness. Patient has no effort against gravity for R arm or R leg. Facial droop noted. Patient has stuttered speech and endorses HA   Consulting Physician/APN/PA: WILMER Syed MD  Reason for Consult: possible conversion  Consultant: Jimena Pina MD    Legal Status  vol      CC:  I can't move my R side  HPI:40 yo male who reports he can't move his R side, his face included. See exam from neurology and myself as well below. Pt does not identify any stressors and says he is not depressed not anxious even though he is getting  in June and trying to save up money. She lives in [place] and is a  and will be visiting in June when they plan to  . He showed a picture of a very attractive young lady (the degree to which this is likely is unknown).     He denies SI/HI/psychosis/anxiety.     Pt says the paralysis usually goes away in a few weeks and it improves with PT.    Chart(s) Review:  Multiple past psych eval for possible conversion disorder.     Medical ROS:  Neurological: R face asymetrical at rest with R eyelid more descended but able to move all facial muscles equally on grimace, tongue protrusion, etc  R side of body not moving. Stuttering it new and does appear to be neurological.    Psychiatric Exam (MSE):  Vitals:Blood pressure 108/70, pulse (!) 55, temperature 36.6 °C (97.8 °F), temperature source Temporal, resp. rate 18, height 1.981 m (6' 6\"), weight (!) 128 kg (283 lb), SpO2 95 %.    Constitutional: as noted above  General Appearance:  Musculoskeletal: as noted above  Alert/Orientation x 3 as he does not know why he can't move his R side  Attn/Concentration:intact  Fund of Knowledge:nottested  Memory recent/remote: grossly intact  Speech: soft, stutter like at times  Language:  fluent  Thought Content: denies psychosis,  SI/HI      Thought Process:   " linear      Insight/Judgement: impaired  Mood: denies depression,anxiety  Affect: blunted    Past Medical Hx:     Past Medical History:   Diagnosis Date   • Anxiety     BIPOLAR   • ASTHMA    • Bipolar 1 disorder ([facility])    • Depression    • Diabetes ([facility])     Type II Diabetes   • Fall     passed out 2 wks ago   • Glaucoma    • Glaucoma 1982    both eyes/ blind on left eye   • Hypothyroidism    • Indigestion     once in a while   • Mental disorder     learning disabilities; speech impairment; developmental delays   • Murmur     since birth   • Pneumonia     remote   • Psychiatric problem 2002    PTSD   • S/P thyroidectomy    • Seizure ([facility]) 2010   • Seizure disorder ([facility])    • Unspecified disorder of thyroid          Past Psychiatric Hx: please refer to the records       Family Psych Hx:  Family History   Problem Relation Age of Onset   • Hypertension Mother    • Heart Disease Mother    • Lung Disease Mother    • Stroke Maternal Grandmother        Social Hx:  Housing: group home  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     Drugs/Alcohol: denies  Labs:  Lab Results   Component Value Date/Time    AMPHUR Negative 02/03/2021 1616    BARBSURINE Negative 02/03/2021 1616    BENZODIAZU Negative 02/03/2021 1616    COCAINEMET Negative 02/03/2021 1616    METHADONE Negative 02/03/2021 1616    ECSTASY Negative 03/05/2016 2045    OPIATES Negative 02/03/2021 1616    OXYCODN Negative 02/03/2021 1616    PCPURINE Negative 02/03/2021 1616    PROPOXY Negative 02/03/2021 1616    CANNABINOID Negative 02/03/2021 1616     Recent Labs     02/26/22 1952 02/27/22  0513   WBC 9.7 8.5   RBC 4.75 4.59*   HEMOGLOBIN 13.8* 13.4*   HEMATOCRIT 42.4 40.5*   MCV 89.3 88.2   MCH 29.1 29.2   RDW 45.6 45.4   PLATELETCT 289 229   MPV 10.0 9.7   NEUTSPOLYS 50.60  --    LYMPHOCYTES 37.20  --    MONOCYTES 8.90  --    EOSINOPHILS 1.20  --    BASOPHILS 0.70  --      Recent Labs     02/26/22 1952 02/27/22 0513   SODIUM 136 135   POTASSIUM 4.3 4.0  "  CHLORIDE 102 102   CO2 21 22   GLUCOSE 131* 129*   BUN 16 15   Results for HOLLY KIM (MRN [id number]) as of 2/28/2022 16:38   Ref. Range 2/26/2022 05:37   Valproic Acid Latest Ref Range: 50.0 - 100.0 ug/mL 89.7   Results for HOLLY KIM (MRN [id number]) as of 2/28/2022 16:38   Ref. Range 2/27/2022 05:13   TSH Latest Ref Range: 0.380 - 5.330 uIU/mL 8.100 (H)   Free T-4 Latest Ref Range: 0.93 - 1.70 ng/dL 1.36       Cranial Imaging: personally reviewed  Cranial CT: possible leukoencephalopathy but no infarcts other than ischemic white matter disease noted  Cranial MRI: as above    EKG: QTc:  none     Meds Current:  Scheduled Medications   Medication Dose Frequency   • Pharmacy Consult Request  1 Each PHARMACY TO DOSE   • acetaminophen  1,000 mg Q6HRS   • senna-docusate  2 Tablet BID   • enoxaparin  40 mg DAILY   • atorvastatin  20 mg Q EVENING   • budesonide-formoterol  2 Puff BID   • divalproex  1,500 mg QHS   • insulin GLARGINE  30 Units BID   • levothyroxine  225 mcg AM ES   • lisinopril  10 mg DAILY   • lurasidone  20 mg Q EVENING   • montelukast  10 mg Q EVENING   • prazosin  4 mg Q EVENING   • sertraline  150 mg DAILY   • insulin regular  2-9 Units 4X/DAY ACHS     Allergies: Abilify, Fish, Geodon [ziprasidone hcl], Aripiprazole, and Ziprasidone      Assessement    1.  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 Mood disorder unspc: hx of. Stable  - to include \"lonliness and boredom\"        3. R/O intellectual limitations  -  learning disability, special education  -speech cog: 3/11: moderate problems in attn. Otherwise unremarkable but \"..Pt has assistance w/ medication management and other IADLs in his group home. No further acute SLP needs are indicated at this time, though pt will benefit from either home health or outpatient SLP tx to address his exacerbated fluency disorder.\"  -limited reliability     4. PTSD: hx of: stable     5.  Medical  -as per 2/3/2021:  \"has been seen multiple " "times by psychiatry. He has a documented SZ disorder, likely intellectual disability and features of a conversion disorder.\"  -HTN  -DM II   -Hx of SZ disorder:depakote     -hypothyroidism  -R tingling and numbness of LE.    Recommendations:  Legal Status: vol        Medication and Other Recommendations: final orders as per Tx Tm  1. No changes to medication    Will continue to follow with you.    Thank you for the consult.    Discharge recommendations: per tx tm     "

## 2022-03-01 NOTE — DISCHARGE PLANNING
MIGUEL Oden re-introduced community care management to the patient. Patient states that he lives at [facility] and they assist with all housing, transportation, food, and appointment scheduling. Patient has no needs for CCM at this time.     CCM will not follow.

## 2022-03-01 NOTE — PROGRESS NOTES
Hospital Medicine Daily Progress Note    Date of Service  2/28/2022    Chief Complaint  Norm Prabhakar is a 41 y.o. male admitted 2/26/2022 with right arm weakness and stuttering.    Hospital Course  No notes on file    Interval Problem Update  Patient was seen and examined at bedside.  No acute events overnight. Patient is resting comfortably in bed and in no acute distress.     - evaluated by neurology, thought likely conversion disorder  - CT head/neck imaging negative  - no improvement in R arm weakness, R leg also weak, still stuttering  - SNF placement    I have personally seen and examined the patient at bedside. I discussed the plan of care with patient.    Consultants/Specialty  neurology    Code Status  Full Code    Disposition  Patient is medically cleared for discharge.   Anticipate discharge to D.  I have placed the appropriate orders for post-discharge needs.    Review of Systems  Review of Systems   Constitutional: Negative for chills and fever.   HENT: Positive for sore throat.    Eyes: Positive for blurred vision (vision loss of R eye chronically).   Respiratory: Negative for cough and shortness of breath.    Cardiovascular: Negative for chest pain and leg swelling.   Gastrointestinal: Negative for abdominal pain, nausea and vomiting.   Genitourinary: Negative for hematuria.   Musculoskeletal: Negative for falls.   Skin: Negative for rash.   Neurological: Positive for speech change and focal weakness. Negative for sensory change and seizures.        Physical Exam  Temp:  [36.3 °C (97.3 °F)-36.6 °C (97.8 °F)] 36.6 °C (97.8 °F)  Pulse:  [54-68] 55  Resp:  [18] 18  BP: (102-113)/(65-73) 108/70  SpO2:  [92 %-97 %] 95 %    Physical Exam  Vitals and nursing note reviewed.   Constitutional:       General: He is not in acute distress.     Appearance: He is obese. He is not ill-appearing, toxic-appearing or diaphoretic.   HENT:      Head: Normocephalic and atraumatic.      Right Ear: External ear normal.       Left Ear: External ear normal.      Nose: Nose normal.      Mouth/Throat:      Mouth: Mucous membranes are moist.      Pharynx: Oropharynx is clear.   Eyes:      General: No scleral icterus.     Extraocular Movements: Extraocular movements intact.   Neck:      Thyroid: Thyroid mass present. No thyroid tenderness.   Cardiovascular:      Rate and Rhythm: Normal rate and regular rhythm.      Heart sounds: No murmur heard.  Pulmonary:      Effort: Pulmonary effort is normal.      Breath sounds: Normal breath sounds.   Abdominal:      General: Bowel sounds are normal.      Palpations: Abdomen is soft.      Tenderness: There is no abdominal tenderness. There is no guarding or rebound.   Musculoskeletal:         General: No swelling or deformity.   Skin:     Coloration: Skin is not jaundiced.      Findings: No bruising.   Neurological:      Mental Status: He is alert and oriented to person, place, and time.      Cranial Nerves: No cranial nerve deficit.      Sensory: No sensory deficit.      Motor: Weakness present.      Comments: 0/5 muscle strength on right upper extremity   Psychiatric:         Mood and Affect: Mood normal.         Behavior: Behavior normal.         Fluids    Intake/Output Summary (Last 24 hours) at 2/28/2022 1604  Last data filed at 2/28/2022 0830  Gross per 24 hour   Intake 240 ml   Output 1000 ml   Net -760 ml       Laboratory  Recent Labs     02/26/22 1952 02/27/22  0513   WBC 9.7 8.5   RBC 4.75 4.59*   HEMOGLOBIN 13.8* 13.4*   HEMATOCRIT 42.4 40.5*   MCV 89.3 88.2   MCH 29.1 29.2   MCHC 32.5* 33.1*   RDW 45.6 45.4   PLATELETCT 289 229   MPV 10.0 9.7     Recent Labs     02/26/22 1952 02/27/22  0513   SODIUM 136 135   POTASSIUM 4.3 4.0   CHLORIDE 102 102   CO2 21 22   GLUCOSE 131* 129*   BUN 16 15   CREATININE 0.96 0.96   CALCIUM 9.1 8.7     Recent Labs     02/26/22 1952   APTT 29.5   INR 0.96               Imaging  DX-CHEST-PORTABLE (1 VIEW)   Final Result      No acute cardiopulmonary  abnormality.      CT-CTA NECK WITH & W/O-POST PROCESSING   Final Result      1.  No occlusion or hemodynamically significant stenosis of the neck arteries.   2.  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      CT-CTA HEAD WITH & W/O-POST PROCESS   Final Result      CT angiogram of the [place] within normal limits.      CT-CEREBRAL PERFUSION ANALYSIS   Final Result      1.  Cerebral blood flow less than 30% likely representing completed infarct = 0 mL.      2.  T Max more than 6 seconds likely representing combination of completed infarct and ischemia = 0 mL.      3.  Mismatched volume likely representing ischemic brain/penumbra = None      4.  Please note that the cerebral perfusion was performed on the limited brain tissue around the basal ganglia region. Infarct/ischemia outside the CT perfusion sections can be missed in this study.      CT-HEAD W/O   Final Result      1.  Confluent cerebral white matter hypodensities again noted which may represent severe chronic leukoencephalopathy..   2.  No acute intracranial abnormality.              Assessment/Plan  * Acute right-sided weakness- (present on admission)  Assessment & Plan  Chronic recurrent issue, per our records he has had 19 MRI head since 2011 with the majority ordered for right-sided weakness  CT scans in the ED negative for acute abnormality, chronic moderate cerebellar loss  Patient says this is similar to his previous episodes and symptoms usually resolve in a few days or less  On exam 0/5 strength, however while talking to the patient his right toe and foot do move  Neurology evaluated in the ED and suggest possible conversion disorder  Patient reports it took ~ 1 week for weakness to improve after last episode  SNF referral placed    Conversion disorder- (present on admission)  Assessment & Plan  Recurrent right-sided weakness, admitted again for this  Psych consult placed     Type 2 diabetes  mellitus without complication, without long-term current use of insulin (HCC)- (present on admission)  Assessment & Plan  Hold home orals.  Continue insulin glargine  Insulin sliding scale hypoglycemia protocol in the hospital    PTSD (post-traumatic stress disorder)- (present on admission)  Assessment & Plan  Continue sertraline and prazosin    History of seizure- (present on admission)  Assessment & Plan  Continue divalproex    Anxiety and depression- (present on admission)  Assessment & Plan  Continue sertraline    Postoperative hypothyroidism- (present on admission)  Assessment & Plan  Continue levothyroxine  Thyroid mass, h/o goiter  CT neck shows thyroid mass  TSH      Primary hypertension- (present on admission)  Assessment & Plan  Continue lisinopril    Mixed hyperlipidemia- (present on admission)  Assessment & Plan  Continue atorvastatin    Class 1 obesity due to excess calories with serious comorbidity and body mass index (BMI) of 32.0 to 32.9 in adult- (present on admission)  Assessment & Plan  BMI 32.7    Asthma- (present on admission)  Assessment & Plan  Continue montelukast and home inhaler       VTE prophylaxis: enoxaparin ppx    I have performed a physical exam and reviewed and updated ROS and Plan today (2/28/2022). In review of yesterday's note (2/27/2022), there are no changes except as documented above.

## 2022-03-01 NOTE — CARE PLAN
The patient is Stable - Low risk of patient condition declining or worsening    Shift Goals  Clinical Goals: neuro status improving, safety  Patient Goals: comfort  Family Goals: kristen    Progress made toward(s) clinical / shift goals:  hourly check on pt. Neuro intact. Assisted pt as needed. Free from injury    Patient is not progressing towards the following goals:

## 2022-03-01 NOTE — PROGRESS NOTES
MIGUEL Oden re-introduced community care management to the patient. Patient states that he lives at [facility] and they assist with all housing, transportation, food, and appointment scheduling. Patient has no needs for CCM at this time.     CCM will not follow.

## 2022-03-01 NOTE — PROGRESS NOTES
[facility] Medicine Daily Progress Note    Date of Service  3/1/2022    Chief Complaint  Norm Prabhakar is a 41 y.o. male admitted 2/26/2022 with right arm weakness and stuttering.    Hospital Course  No notes on file    Interval Problem Update  No acute events overnight.  Patient has no complaints.  Patient is medically cleared to discharge to SNF for continued PT/OT therapies.  Pending SNF.    I have personally seen and examined the patient at bedside. I discussed the plan of care with patient.    Consultants/Specialty  neurology    Code Status  Full Code    Disposition  Patient is medically cleared for discharge.   Anticipate discharge to SNF.  I have placed the appropriate orders for post-discharge needs.    Review of Systems  Review of Systems   Constitutional: Negative for chills and fever.   HENT: Positive for sore throat.    Eyes: Positive for blurred vision (vision loss of R eye chronically).   Respiratory: Negative for cough and shortness of breath.    Cardiovascular: Negative for chest pain and leg swelling.   Gastrointestinal: Negative for abdominal pain, nausea and vomiting.   Genitourinary: Negative for hematuria.   Musculoskeletal: Negative for falls.   Skin: Negative for rash.   Neurological: Positive for speech change and focal weakness. Negative for sensory change and seizures.        Physical Exam  Temp:  [36.1 °C (97 °F)-36.7 °C (98 °F)] 36.3 °C (97.4 °F)  Pulse:  [50-78] 54  Resp:  [16-18] 16  BP: (107-113)/(64-72) 112/72  SpO2:  [92 %-97 %] 96 %    Physical Exam  Vitals and nursing note reviewed.   Constitutional:       General: He is not in acute distress.     Appearance: He is obese. He is not ill-appearing, toxic-appearing or diaphoretic.   HENT:      Head: Normocephalic and atraumatic.      Right Ear: External ear normal.      Left Ear: External ear normal.      Nose: Nose normal.      Mouth/Throat:      Mouth: Mucous membranes are moist.      Pharynx: Oropharynx is clear.   Eyes:       General: No scleral icterus.     Extraocular Movements: Extraocular movements intact.   Neck:      Thyroid: Thyroid mass present. No thyroid tenderness.   Cardiovascular:      Rate and Rhythm: Normal rate and regular rhythm.      Heart sounds: No murmur heard.  Pulmonary:      Effort: Pulmonary effort is normal.      Breath sounds: Normal breath sounds.   Abdominal:      General: Bowel sounds are normal.      Palpations: Abdomen is soft.      Tenderness: There is no abdominal tenderness. There is no guarding or rebound.   Musculoskeletal:         General: No swelling or deformity.   Skin:     Coloration: Skin is not jaundiced.      Findings: No bruising.   Neurological:      Mental Status: He is alert and oriented to person, place, and time.      Cranial Nerves: No cranial nerve deficit.      Sensory: No sensory deficit.      Motor: Weakness present.      Comments: 0/5 muscle strength on right upper extremity   Psychiatric:         Mood and Affect: Mood normal.         Behavior: Behavior normal.         Fluids    Intake/Output Summary (Last 24 hours) at 3/1/2022 1549  Last data filed at 3/1/2022 0800  Gross per 24 hour   Intake --   Output 1900 ml   Net -1900 ml       Laboratory  Recent Labs     02/26/22 1952 02/27/22  0513   WBC 9.7 8.5   RBC 4.75 4.59*   HEMOGLOBIN 13.8* 13.4*   HEMATOCRIT 42.4 40.5*   MCV 89.3 88.2   MCH 29.1 29.2   MCHC 32.5* 33.1*   RDW 45.6 45.4   PLATELETCT 289 229   MPV 10.0 9.7     Recent Labs     02/26/22 1952 02/27/22  0513   SODIUM 136 135   POTASSIUM 4.3 4.0   CHLORIDE 102 102   CO2 21 22   GLUCOSE 131* 129*   BUN 16 15   CREATININE 0.96 0.96   CALCIUM 9.1 8.7     Recent Labs     02/26/22 1952   APTT 29.5   INR 0.96               Imaging  DX-CHEST-PORTABLE (1 VIEW)   Final Result      No acute cardiopulmonary abnormality.      CT-CTA NECK WITH & W/O-POST PROCESSING   Final Result      1.  No occlusion or hemodynamically significant stenosis of the neck arteries.   2.  Redemonstrated  enhancing masses in the anterior neck, possibly ectopic thyroid tissue.      CT-CTA HEAD WITH & W/O-POST PROCESS   Final Result      CT angiogram of the [place] within normal limits.      CT-CEREBRAL PERFUSION ANALYSIS   Final Result      1.  Cerebral blood flow less than 30% likely representing completed infarct = 0 mL.      2.  T Max more than 6 seconds likely representing combination of completed infarct and ischemia = 0 mL.      3.  Mismatched volume likely representing ischemic brain/penumbra = None      4.  Please note that the cerebral perfusion was performed on the limited brain tissue around the basal ganglia region. Infarct/ischemia outside the CT perfusion sections can be missed in this study.      CT-HEAD W/O   Final Result      1.  Confluent cerebral white matter hypodensities again noted which may represent severe chronic leukoencephalopathy..   2.  No acute intracranial abnormality.              Assessment/Plan  * Acute right-sided weakness- (present on admission)  Assessment & Plan  Chronic recurrent issue, per our records he has had 19 MRI head since 2011 with the majority ordered for right-sided weakness  CT scans in the ED negative for acute abnormality, chronic moderate cerebellar loss  Patient says this is similar to his previous episodes and symptoms usually resolve in a few days or less  On exam 0/5 strength, however while talking to the patient his right toe and foot do move  Neurology evaluated in the ED and suggest possible conversion disorder  Patient reports it took ~ 1 week for weakness to improve after last episode  SNF referral placed    Conversion disorder- (present on admission)  Assessment & Plan  Recurrent right-sided weakness, admitted again for this  Psych consult placed     Primary hypertension- (present on admission)  Assessment & Plan  Continue lisinopril    Mixed hyperlipidemia- (present on admission)  Assessment & Plan  Continue atorvastatin    Class 1 obesity due  to excess calories with serious comorbidity and body mass index (BMI) of 32.0 to 32.9 in adult- (present on admission)  Assessment & Plan  BMI 32.7    Type 2 diabetes mellitus without complication, without long-term current use of insulin (HCC)- (present on admission)  Assessment & Plan  Hold home orals.  Continue insulin glargine  Insulin sliding scale hypoglycemia protocol in the hospital    PTSD (post-traumatic stress disorder)- (present on admission)  Assessment & Plan  Continue sertraline and prazosin    History of seizure- (present on admission)  Assessment & Plan  Continue divalproex    Anxiety and depression- (present on admission)  Assessment & Plan  Continue sertraline    Postoperative hypothyroidism- (present on admission)  Assessment & Plan  Continue levothyroxine  Thyroid mass, h/o goiter  CT neck shows thyroid mass  TSH      Asthma- (present on admission)  Assessment & Plan  Continue montelukast and home inhaler       VTE prophylaxis: enoxaparin ppx    I have performed a physical exam and reviewed and updated ROS and Plan today (3/1/2022). In review of yesterday's note (2/28/2022), there are no changes except as documented above.
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
## 2022-03-01 NOTE — DISCHARGE PLANNING
Care Transition Team Discharge Planning    Anticipated Discharge Disposition: DC to SNF - pending.    Action: Lsw spoke with Philip from [facility]. She reported that she met with patient yesterday. The patient is not appropriate for housing program. The agency will continue to provide case management services. Philip can be reached at [phone] or [phone].     Lsw met with patient to discuss the issue. In addition, the CTT assessment was completed.    The patient stated that he lives at the [facility]. Lsw attempted to contact [facility] at [phone] to confirm the location and if the patient can return. There was no answer because the line was busy.    Patient stated they he is independent with ADLs.     Patient stated, he is okay with going to SNF. He gave this Lsw verbal permission to place a referral to all local SNF's. CHOICE form was faxed to [facility].    Barriers to Discharge: Not medically cleared.    Plan: Lsw will assist medical team with DC planning.    Care Transition Team Assessment    Information Source  Orientation Level: Oriented X4  Information Given By: Patient  Informant's Name: Norm Prabhakar  Who is responsible for making decisions for patient? : Patient    Readmission Evaluation  Is this a readmission?: Yes - unplanned readmission  Why do you think you were readmitted?: right side weakness  Was an appointment arranged for you prior to discharge?: No  Were there new prescriptions you were supposed to fill after you were discharged?: No  Did you understand your discharge instructions?: Yes  Did you have enough support after your last discharge?: Yes    Elopement Risk  Legal Hold: No  Ambulatory or Self Mobile in Wheelchair: No-Not an Elopement Risk  Elopement Risk: Not at Risk for Elopement    Interdisciplinary Discharge Planning  Lives with - Patient's Self Care Capacity: Alone and Able to Care For Self  Housing / Facility: Extended Care Facility,1 Story Apartment / Condo  Prior Services: Intermittent  Physical Support for ADL Per Service    Discharge Preparedness  What is your plan after discharge?: Skilled nursing facility  What are your discharge supports?: Other (comment)  Prior Functional Level: Ambulatory,Independent with Activities of Daily Living,Needs Assist with Medication Management  Difficulity with ADLs: None  Difficulity with IADLs: Cooking,Driving,Keeping track of finances,Laundry,Managing medication,Shopping  Difficulity with IADL Comments: The  provides services. [facility].    Functional Assesment  Prior Functional Level: Ambulatory,Independent with Activities of Daily Living,Needs Assist with Medication Management    Finances  Financial Barriers to Discharge: No  Prescription Coverage: Yes    Vision / Hearing Impairment  Right Eye Vision: Wears Glasses  Left Eye Vision: Wears Glasses         Advance Directive  Advance Directive?: None  Advance Directive offered?: AD Booklet refused    Domestic Abuse  Have you ever been the victim of abuse or violence?: No    Psychological Assessment  History of Substance Abuse: None  History of Psychiatric Problems: Yes (PTSD, Depression, Anxiety, Biolar, personality Disorder, Broderline Personality)  Non-compliant with Treatment: Yes  Newly Diagnosed Illness: No    Discharge Risks or Barriers  Discharge risks or barriers?: No  Patient risk factors: Readmission    Anticipated Discharge Information  Discharge Disposition: D/T to SNF with Medicare cert in anticipation of skilled care (03)  Discharge Address: [address] [place] [id number]  Discharge Contact Phone Number: [phone]

## 2022-03-02 LAB
GLUCOSE BLD STRIP.AUTO-MCNC: 153 MG/DL (ref 65–99)
GLUCOSE BLD STRIP.AUTO-MCNC: 157 MG/DL (ref 65–99)
GLUCOSE BLD STRIP.AUTO-MCNC: 180 MG/DL (ref 65–99)
GLUCOSE BLD STRIP.AUTO-MCNC: 193 MG/DL (ref 65–99)
GLUCOSE BLD STRIP.AUTO-MCNC: 232 MG/DL (ref 65–99)

## 2022-03-02 PROCEDURE — 700111 HCHG RX REV CODE 636 W/ 250 OVERRIDE (IP): Performed by: STUDENT IN AN ORGANIZED HEALTH CARE EDUCATION/TRAINING PROGRAM

## 2022-03-02 PROCEDURE — 700102 HCHG RX REV CODE 250 W/ 637 OVERRIDE(OP): Performed by: INTERNAL MEDICINE

## 2022-03-02 PROCEDURE — A9270 NON-COVERED ITEM OR SERVICE: HCPCS | Performed by: INTERNAL MEDICINE

## 2022-03-02 PROCEDURE — G0378 HOSPITAL OBSERVATION PER HR: HCPCS

## 2022-03-02 PROCEDURE — 96372 THER/PROPH/DIAG INJ SC/IM: CPT

## 2022-03-02 PROCEDURE — 99224 PR SUBSEQUENT OBSERVATION CARE,LEVEL I: CPT | Performed by: STUDENT IN AN ORGANIZED HEALTH CARE EDUCATION/TRAINING PROGRAM

## 2022-03-02 PROCEDURE — A9270 NON-COVERED ITEM OR SERVICE: HCPCS | Performed by: STUDENT IN AN ORGANIZED HEALTH CARE EDUCATION/TRAINING PROGRAM

## 2022-03-02 PROCEDURE — 82962 GLUCOSE BLOOD TEST: CPT | Mod: 91

## 2022-03-02 PROCEDURE — 700102 HCHG RX REV CODE 250 W/ 637 OVERRIDE(OP): Performed by: STUDENT IN AN ORGANIZED HEALTH CARE EDUCATION/TRAINING PROGRAM

## 2022-03-02 PROCEDURE — 97535 SELF CARE MNGMENT TRAINING: CPT

## 2022-03-02 PROCEDURE — 97530 THERAPEUTIC ACTIVITIES: CPT

## 2022-03-02 RX ADMIN — ACETAMINOPHEN 1000 MG: 500 TABLET ORAL at 20:28

## 2022-03-02 RX ADMIN — INSULIN HUMAN 2 UNITS: 100 INJECTION, SOLUTION PARENTERAL at 08:43

## 2022-03-02 RX ADMIN — ACETAMINOPHEN 1000 MG: 500 TABLET ORAL at 08:44

## 2022-03-02 RX ADMIN — INSULIN GLARGINE 30 UNITS: 100 INJECTION, SOLUTION SUBCUTANEOUS at 06:13

## 2022-03-02 RX ADMIN — INSULIN HUMAN 2 UNITS: 100 INJECTION, SOLUTION PARENTERAL at 13:03

## 2022-03-02 RX ADMIN — PRAZOSIN HYDROCHLORIDE 4 MG: 2 CAPSULE ORAL at 17:51

## 2022-03-02 RX ADMIN — INSULIN HUMAN 3 UNITS: 100 INJECTION, SOLUTION PARENTERAL at 21:00

## 2022-03-02 RX ADMIN — SERTRALINE 150 MG: 50 TABLET, FILM COATED ORAL at 20:27

## 2022-03-02 RX ADMIN — DIVALPROEX SODIUM 1500 MG: 500 TABLET, DELAYED RELEASE ORAL at 20:27

## 2022-03-02 RX ADMIN — BUDESONIDE AND FORMOTEROL FUMARATE DIHYDRATE 2 PUFF: 160; 4.5 AEROSOL RESPIRATORY (INHALATION) at 06:08

## 2022-03-02 RX ADMIN — ACETAMINOPHEN 1000 MG: 500 TABLET ORAL at 02:17

## 2022-03-02 RX ADMIN — BUDESONIDE AND FORMOTEROL FUMARATE DIHYDRATE 2 PUFF: 160; 4.5 AEROSOL RESPIRATORY (INHALATION) at 17:53

## 2022-03-02 RX ADMIN — LURASIDONE HYDROCHLORIDE 20 MG: 20 TABLET, FILM COATED ORAL at 17:51

## 2022-03-02 RX ADMIN — ACETAMINOPHEN 1000 MG: 500 TABLET ORAL at 13:05

## 2022-03-02 RX ADMIN — ATORVASTATIN CALCIUM 20 MG: 20 TABLET, FILM COATED ORAL at 17:52

## 2022-03-02 RX ADMIN — LEVOTHYROXINE SODIUM 225 MCG: 0.07 TABLET ORAL at 06:06

## 2022-03-02 RX ADMIN — MONTELUKAST 10 MG: 10 TABLET, FILM COATED ORAL at 17:52

## 2022-03-02 RX ADMIN — LISINOPRIL 10 MG: 10 TABLET ORAL at 06:06

## 2022-03-02 RX ADMIN — INSULIN HUMAN 2 UNITS: 100 INJECTION, SOLUTION PARENTERAL at 17:55

## 2022-03-02 RX ADMIN — INSULIN GLARGINE 30 UNITS: 100 INJECTION, SOLUTION SUBCUTANEOUS at 17:55

## 2022-03-02 RX ADMIN — ENOXAPARIN SODIUM 40 MG: 40 INJECTION SUBCUTANEOUS at 06:09

## 2022-03-02 ASSESSMENT — COGNITIVE AND FUNCTIONAL STATUS - GENERAL
SUGGESTED CMS G CODE MODIFIER MOBILITY: CM
MOBILITY SCORE: 8
HELP NEEDED FOR BATHING: A LOT
SUGGESTED CMS G CODE MODIFIER DAILY ACTIVITY: CK
DAILY ACTIVITIY SCORE: 16
TURNING FROM BACK TO SIDE WHILE IN FLAT BAD: UNABLE
TOILETING: A LITTLE
CLIMB 3 TO 5 STEPS WITH RAILING: TOTAL
MOVING TO AND FROM BED TO CHAIR: UNABLE
STANDING UP FROM CHAIR USING ARMS: A LITTLE
PERSONAL GROOMING: A LITTLE
WALKING IN HOSPITAL ROOM: TOTAL
EATING MEALS: A LITTLE
MOVING FROM LYING ON BACK TO SITTING ON SIDE OF FLAT BED: UNABLE
DRESSING REGULAR UPPER BODY CLOTHING: A LITTLE
DRESSING REGULAR LOWER BODY CLOTHING: A LOT

## 2022-03-02 ASSESSMENT — GAIT ASSESSMENTS: GAIT LEVEL OF ASSIST: UNABLE TO PARTICIPATE

## 2022-03-02 ASSESSMENT — ENCOUNTER SYMPTOMS
FEVER: 0
COUGH: 0
SEIZURES: 0
BLURRED VISION: 1
SORE THROAT: 1
FOCAL WEAKNESS: 1
NAUSEA: 0
CHILLS: 0
FALLS: 0
SPEECH CHANGE: 1
SHORTNESS OF BREATH: 0
SENSORY CHANGE: 0
VOMITING: 0
ABDOMINAL PAIN: 0

## 2022-03-02 ASSESSMENT — PAIN DESCRIPTION - PAIN TYPE
TYPE: ACUTE PAIN
TYPE: ACUTE PAIN

## 2022-03-02 NOTE — CARE PLAN
The patient is Stable - Low risk of patient condition declining or worsening    Shift Goals  Clinical Goals: Monitor neuro status  Patient Goals: Sleep  Family Goals: JUANCARLOS    Progress made toward(s) clinical / shift goals:    Problem: Knowledge Deficit - Standard  Goal: Patient and family/care givers will demonstrate understanding of plan of care, disease process/condition, diagnostic tests and medications  3/2/2022 0110 by Hari Huynh R.N.  Outcome: Progressing   The patient received education reinforcement regarding plan of care, condition, and medications.      Problem: Pain - Standard  Goal: Alleviation of pain or a reduction in pain to the patient’s comfort goal  3/2/2022 0110 by STEVE PenaN.  Outcome: Progressing   Patient verbalizes pain level and comfort goals. No pain reported at this time.      Problem: Fall Risk  Goal: Patient will remain free from falls  3/2/2022 0110 by STEVE PenaN.  Outcome: Progressing   Appropriate fall precautions are in place. Bed alarm is on at all times. Patient uses bedside urinal for voiding needs.       Patient is not progressing towards the following goals:

## 2022-03-02 NOTE — CARE PLAN
The patient is Stable - Low risk of patient condition declining or worsening    Shift Goals  Clinical Goals: neuro status improving, safety  Patient Goals: comfort  Family Goals: kristen    Progress made toward(s) clinical / shift goals:  pt up to chair with assistance, fall/aspiration precautions in place. Free from injury    Patient is not progressing towards the following goals:

## 2022-03-02 NOTE — DISCHARGE PLANNING
Anticipated Discharge Disposition: group home    Action: Chart reviewed and discussed in rounds.  There are no SNF accepts to date.  CM spoke with Marv at [facility] [phone] and she states the patient has to be totally indep in order to return.     Barriers to Discharge: placement    Plan: CM will inquire on other group homes.

## 2022-03-02 NOTE — CONSULTS
"[facility] INPATIENT PSYCHIATRIC CONSULT LIAISON NOTE:     DOS: 03/02/2022    REASON FOR CONSULT: \"Patient states sudden onset right sided weakness. Patient has no effort against gravity for R arm or R leg. Facial droop noted. Patient has stuttered speech and endorses HA\"    CC: “I am doing Ok, keeping myself entertained with my laptop”    HPI: Norm Prabhakar is a 41 year old male patient who presented to the ED on 2/26/2022 with sudden right-sided weakness and speech changes.  PMH of asthma, DM 2, bipolar disorder, hypothyroidism, seizure disorder, depression/anxiety, HTN, dyslipidemia.  Said he was last normal around 6:30 when symptoms began.  This is a chronic issue for him since 2010 with multiple previous negative MRIs.  Current symptoms are similar to prior, says usually improve in a few days days    Patient was seen today as a follow up consult. Reports sleeping very well last night.  Denies SI/HI/AVH, he is still unable to move his left upper and right extremities, though slight movement noted to right arm and left leg and command to move them. Slight right facial droop noted. 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  Reports he is a little bored, mom is coming to visit.  Denies having any symptoms of depression and or anxiety at this time.      LEGAL HOLD: Voluntary    ALLERGIES: Abilify, Fish, Geodon [ziprasidone hcl], Aripiprazole     PAST MEDICAL HISTORY:  ASTHMA,  Diabetes ([facility]), Fall, Glaucoma (1982), Hypothyroidism, Indigestion,  Murmur, Pneumonia, S/P thyroidectomy, Seizure ([facility]) (2010), and Unspecified disorder of thyroid    PAST PSYCHIATRIC HISTORY:  Bipolar Disorder I.   Depression.   Anxiety    LEGAL HISTORY:  Denies    SOCIAL HISTORY:  Quit smoking 22 month ago. Previous alcohol use.    CURRENT HOSPITAL PROBLEMS:  Acute right-sided weakness   Anxiety and depression   Asthma   Class 1 obesity due to excess calories with serious " "comorbidity and body mass index (BMI) of 32.0 to 32.9 in adult   Conversion disorder   History of seizure   Mixed hyperlipidemia   Postoperative hypothyroidism   Primary hypertension   PTSD (post-traumatic stress disorder)   Type 2 diabetes mellitus without complication, without long-term current use of insulin ([facility])       PSYCHIATRIC EXAMNIATION:  VITALS: WNL    MENTAL STATUS EXAM:  Appearance: Dressed in hospital gown, normal grooming and hygiene, no acute  distress.   Attitude: Calm and cooperative.  Behavior: Resting in bed.   Musculoskeletal: Unable to move his right upper and lower extremities.  Eye Contact: Adequate.  Speech: Stutter, coherent, adequate volume  Affect: Normal  Mood: \"bored\"  Process: Goal-directed, organized. Linear  Content: Negative for Suicidal and Homicidal ideations.  Perception: Negative for auditory and visual hallucinations  Orientation: Oriented to Time, Place, Person and Self.  Memory: No significant deficits elicited  Insight: Fair    LABS:  Unremarkable    CURRENT PSYCHIATRIC MEDICATIONS:  Depakote 1500 mg P.O at HS  Lurasidone 20mg P.O in the evening  Prazosin 4mg P.O every evening  Sertraline 150mg P.O Daily    ASSESSMENT AND PLAN:  -Mood Disorder due to another health conditio- Unspecified  -Functional Neurological disorder  -PTSD-Chronic  -Conversion Disorder (recurrent right sided weakness since 2010, lasts for a few days then gets better. Patient is left handed)    Legal Hold: Voluntary  -No medication changes needed at this time.  -Psych CL will continue following patient while at [facility].  -Medication reconciliation was completed.  -Reviewed safety plan - 911, ER, PCM, MHC, Suicide Crisis Line, Nursing staff while    inpatient.  -Visitors: Yes  -Personal Belongings: Yes  -Observation Level:Tele monitor    -Instruction: Discharge recommendations per treatment team.    "

## 2022-03-02 NOTE — PROGRESS NOTES
[facility] Medicine Daily Progress Note    Date of Service  3/2/2022    Chief Complaint  Norm Prabhakar is a 41 y.o. male admitted 2/26/2022 with right arm weakness and stuttering.    Hospital Course  No notes on file    Interval Problem Update  No acute events overnight.  Patient on do not admit list by all SNF's.  PMR consulted for rehab consideration.  If rejected by PMR, will rehab in house until functionally able to discharge home safely.    I have personally seen and examined the patient at bedside. I discussed the plan of care with patient.    Consultants/Specialty  neurology    Code Status  Full Code    Disposition  Patient is medically cleared for discharge.   Anticipate discharge to rehab vs home.  I have placed the appropriate orders for post-discharge needs.    Review of Systems  Review of Systems   Constitutional: Negative for chills and fever.   HENT: Positive for sore throat.    Eyes: Positive for blurred vision (vision loss of R eye chronically).   Respiratory: Negative for cough and shortness of breath.    Cardiovascular: Negative for chest pain and leg swelling.   Gastrointestinal: Negative for abdominal pain, nausea and vomiting.   Genitourinary: Negative for hematuria.   Musculoskeletal: Negative for falls.   Skin: Negative for rash.   Neurological: Positive for speech change and focal weakness. Negative for sensory change and seizures.        Physical Exam  Temp:  [36.2 °C (97.1 °F)-37.3 °C (99.2 °F)] 36.2 °C (97.1 °F)  Pulse:  [56-68] 56  Resp:  [16-18] 18  BP: (105-118)/(63-71) 105/69  SpO2:  [92 %-94 %] 94 %    Physical Exam  Vitals and nursing note reviewed.   Constitutional:       General: He is not in acute distress.     Appearance: He is obese. He is not ill-appearing, toxic-appearing or diaphoretic.   HENT:      Head: Normocephalic and atraumatic.      Right Ear: External ear normal.      Left Ear: External ear normal.      Nose: Nose normal.      Mouth/Throat:      Mouth: Mucous  membranes are moist.      Pharynx: Oropharynx is clear.   Eyes:      General: No scleral icterus.     Extraocular Movements: Extraocular movements intact.   Neck:      Thyroid: Thyroid mass present. No thyroid tenderness.   Cardiovascular:      Rate and Rhythm: Normal rate and regular rhythm.      Heart sounds: No murmur heard.  Pulmonary:      Effort: Pulmonary effort is normal.      Breath sounds: Normal breath sounds.   Abdominal:      General: Bowel sounds are normal.      Palpations: Abdomen is soft.      Tenderness: There is no abdominal tenderness. There is no guarding or rebound.   Musculoskeletal:         General: No swelling or deformity.   Skin:     Coloration: Skin is not jaundiced.      Findings: No bruising.   Neurological:      Mental Status: He is alert and oriented to person, place, and time.      Cranial Nerves: No cranial nerve deficit.      Sensory: No sensory deficit.      Motor: Weakness present.      Comments: 0/5 muscle strength on right upper extremity   Psychiatric:         Mood and Affect: Mood normal.         Behavior: Behavior normal.         Fluids    Intake/Output Summary (Last 24 hours) at 3/2/2022 1240  Last data filed at 3/1/2022 2000  Gross per 24 hour   Intake --   Output 900 ml   Net -900 ml       Laboratory                        Imaging  DX-CHEST-PORTABLE (1 VIEW)   Final Result      No acute cardiopulmonary abnormality.      CT-CTA NECK WITH & W/O-POST PROCESSING   Final Result      1.  No occlusion or hemodynamically significant stenosis of the neck arteries.   2.  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      CT-CTA HEAD WITH & W/O-POST PROCESS   Final Result      CT angiogram of the [place] within normal limits.      CT-CEREBRAL PERFUSION ANALYSIS   Final Result      1.  Cerebral blood flow less than 30% likely representing completed infarct = 0 mL.      2.  T Max more than 6 seconds likely representing combination of  completed infarct and ischemia = 0 mL.      3.  Mismatched volume likely representing ischemic brain/penumbra = None      4.  Please note that the cerebral perfusion was performed on the limited brain tissue around the basal ganglia region. Infarct/ischemia outside the CT perfusion sections can be missed in this study.      CT-HEAD W/O   Final Result      1.  Confluent cerebral white matter hypodensities again noted which may represent severe chronic leukoencephalopathy..   2.  No acute intracranial abnormality.              Assessment/Plan  * Acute right-sided weakness- (present on admission)  Assessment & Plan  Chronic recurrent issue, per our records he has had 19 MRI head since 2011 with the majority ordered for right-sided weakness  CT scans in the ED negative for acute abnormality, chronic moderate cerebellar loss  Patient says this is similar to his previous episodes and symptoms usually resolve in a few days or less  On exam 0/5 strength, however while talking to the patient his right toe and foot do move  Neurology evaluated in the ED and suggest possible conversion disorder  Patient reports it took ~ 1 week for weakness to improve after last episode  Patient refused by all SNFs  PMR consulted for possible rehab consideration, if rejected will rehab in house until functionally safe to return to group home.    Conversion disorder- (present on admission)  Assessment & Plan  Recurrent right-sided weakness, admitted again for this  Psych consult placed     Primary hypertension- (present on admission)  Assessment & Plan  Continue lisinopril    Mixed hyperlipidemia- (present on admission)  Assessment & Plan  Continue atorvastatin    Class 1 obesity due to excess calories with serious comorbidity and body mass index (BMI) of 32.0 to 32.9 in adult- (present on admission)  Assessment & Plan  BMI 32.7    Type 2 diabetes mellitus without complication, without long-term current use of insulin (HCC)- (present on  admission)  Assessment & Plan  Hold home orals.  Continue insulin glargine  Insulin sliding scale hypoglycemia protocol in the hospital    PTSD (post-traumatic stress disorder)- (present on admission)  Assessment & Plan  Continue sertraline and prazosin    History of seizure- (present on admission)  Assessment & Plan  Continue divalproex    Anxiety and depression- (present on admission)  Assessment & Plan  Continue sertraline    Postoperative hypothyroidism- (present on admission)  Assessment & Plan  Continue levothyroxine  Thyroid mass, h/o goiter  CT neck shows thyroid mass  TSH      Asthma- (present on admission)  Assessment & Plan  Continue montelukast and home inhaler       VTE prophylaxis: enoxaparin ppx    I have performed a physical exam and reviewed and updated ROS and Plan today (3/2/2022). In review of yesterday's note (3/1/2022), there are no changes except as documented above.

## 2022-03-02 NOTE — DISCHARGE PLANNING
[facility] Acute Rehabilitation Transitional Care Coordination    Referral from:  Dr. Corona   Insurance Provider on Facesheet: Medicaid   Potential Rehab Diagnosis: Conversion disorder     Chart review indicates patient has on going medical management and therapy needs to possibly meet inpatient rehab facility criteria with the goal of returning to community.    D/C support: per dc planning notes Lsw spoke with Philip from [facility]. She reported that she met with patient yesterday. The patient is not appropriate for housing program. Per MD note Patient on do not admit list by all SNF's     Physiatry consultation per protocol.     Last Covid test:           Thank you for the referral.

## 2022-03-02 NOTE — THERAPY
Occupational Therapy  Daily Treatment     Patient Name: Norm Prabhakar  Age:  41 y.o., Sex:  male  Medical Record #: [id number]  Today's Date: 3/2/2022     Precautions  Precautions: (P) Fall Risk    Assessment    Pt seen for follow up OT tx session, pt making steady progress with functional mobility and ADLs pt continues to have inconsistent functional use of RUE, uses left hand to bring right to walker but actively  walker and maintains throughout functional mobility and multiple transfers but continues to state he cannot use right arm, pt also able to appropriately weight shift to RLE to advance LLE despite stated weakness. Will continue to see for skilled therapy as well as recommend post-acute placement due to patient presenting below baseline.    Plan    Continue current treatment plan.    DC Equipment Recommendations: (P) Unable to determine at this time  Discharge Recommendations: (P) Recommend post-acute placement for additional occupational therapy services prior to discharge home    Objective       03/02/22 1534   Precautions   Precautions Fall Risk   Balance   Sitting Balance (Static) Fair   Sitting Balance (Dynamic) Fair   Standing Balance (Static) Poor +   Standing Balance (Dynamic) Poor   Weight Shift Sitting Fair   Weight Shift Standing Fair   Skilled Intervention Verbal Cuing   Comments w/ FWW   Bed Mobility    Supine to Sit Contact Guard Assist   Scooting Contact Guard Assist   Skilled Intervention Verbal Cuing   Activities of Daily Living   Eating Supervision   Grooming Supervision;Seated   Upper Body Dressing Minimal Assist   Lower Body Dressing Maximal Assist   Skilled Intervention Verbal Cuing   How much help from another person does the patient currently need...   Putting on and taking off regular lower body clothing? 2   Bathing (including washing, rinsing, and drying)? 2   Toileting, which includes using a toilet, bedpan, or urinal? 3   Putting on and taking off regular upper body  clothing? 3   Taking care of personal grooming such as brushing teeth? 3   Eating meals? 3   6 Clicks Daily Activity Score 16   Functional Mobility   Sit to Stand Standby Assist   Bed, Chair, Wheelchair Transfer Contact Guard Assist   Toilet Transfers Refused   Transfer Method Stand Pivot   Mobility bed mobility, STS at EOB, pivot transfer to chair   Skilled Intervention Verbal Cuing   Comments w/ FWW   Activity Tolerance   Sitting in Chair left seated in chair   Sitting Edge of Bed 10 min   Standing 2 min   Short Term Goals   Short Term Goal # 1 Pt will complete ADL transfers with supervision   Goal Outcome # 1 Progressing as expected   Short Term Goal # 2 Pt will complete LB dressing with supervision   Goal Outcome # 2 Progressing slower than expected   Short Term Goal # 3 Pt will complete toileting with supervision   Goal Outcome # 3 Goal not met   Education Group   Education Provided Role of Occupational Therapist   Role of Occupational Therapist Patient Response Patient;Acceptance;Explanation;Action Demonstration   Anticipated Discharge Equipment and Recommendations   DC Equipment Recommendations Unable to determine at this time   Discharge Recommendations Recommend post-acute placement for additional occupational therapy services prior to discharge home   Interdisciplinary Plan of Care Collaboration   IDT Collaboration with  Nursing   Patient Position at End of Therapy Seated;Chair Alarm On;Call Light within Reach;Tray Table within Reach;Phone within Reach   Collaboration Comments RN updated

## 2022-03-03 ENCOUNTER — HOSPITAL ENCOUNTER (INPATIENT)
Facility: REHABILITATION | Age: 42
End: 2022-03-03
Attending: PHYSICAL MEDICINE & REHABILITATION | Admitting: PHYSICAL MEDICINE & REHABILITATION
Payer: MEDICAID

## 2022-03-03 LAB
GLUCOSE BLD STRIP.AUTO-MCNC: 218 MG/DL (ref 65–99)
GLUCOSE BLD STRIP.AUTO-MCNC: 260 MG/DL (ref 65–99)
GLUCOSE BLD STRIP.AUTO-MCNC: 263 MG/DL (ref 65–99)

## 2022-03-03 PROCEDURE — 700102 HCHG RX REV CODE 250 W/ 637 OVERRIDE(OP): Performed by: INTERNAL MEDICINE

## 2022-03-03 PROCEDURE — 96372 THER/PROPH/DIAG INJ SC/IM: CPT

## 2022-03-03 PROCEDURE — A9270 NON-COVERED ITEM OR SERVICE: HCPCS | Performed by: STUDENT IN AN ORGANIZED HEALTH CARE EDUCATION/TRAINING PROGRAM

## 2022-03-03 PROCEDURE — 700102 HCHG RX REV CODE 250 W/ 637 OVERRIDE(OP): Performed by: STUDENT IN AN ORGANIZED HEALTH CARE EDUCATION/TRAINING PROGRAM

## 2022-03-03 PROCEDURE — G0378 HOSPITAL OBSERVATION PER HR: HCPCS

## 2022-03-03 PROCEDURE — 99224 PR SUBSEQUENT OBSERVATION CARE,LEVEL I: CPT | Performed by: STUDENT IN AN ORGANIZED HEALTH CARE EDUCATION/TRAINING PROGRAM

## 2022-03-03 PROCEDURE — 82962 GLUCOSE BLOOD TEST: CPT

## 2022-03-03 PROCEDURE — A9270 NON-COVERED ITEM OR SERVICE: HCPCS | Performed by: INTERNAL MEDICINE

## 2022-03-03 RX ADMIN — PRAZOSIN HYDROCHLORIDE 4 MG: 2 CAPSULE ORAL at 17:51

## 2022-03-03 RX ADMIN — ACETAMINOPHEN 1000 MG: 500 TABLET ORAL at 20:45

## 2022-03-03 RX ADMIN — INSULIN GLARGINE 30 UNITS: 100 INJECTION, SOLUTION SUBCUTANEOUS at 17:55

## 2022-03-03 RX ADMIN — ACETAMINOPHEN 1000 MG: 500 TABLET ORAL at 08:27

## 2022-03-03 RX ADMIN — INSULIN HUMAN 5 UNITS: 100 INJECTION, SOLUTION PARENTERAL at 17:55

## 2022-03-03 RX ADMIN — BUDESONIDE AND FORMOTEROL FUMARATE DIHYDRATE 2 PUFF: 160; 4.5 AEROSOL RESPIRATORY (INHALATION) at 17:51

## 2022-03-03 RX ADMIN — INSULIN GLARGINE 30 UNITS: 100 INJECTION, SOLUTION SUBCUTANEOUS at 04:49

## 2022-03-03 RX ADMIN — LURASIDONE HYDROCHLORIDE 20 MG: 20 TABLET, FILM COATED ORAL at 17:51

## 2022-03-03 RX ADMIN — LISINOPRIL 10 MG: 10 TABLET ORAL at 04:43

## 2022-03-03 RX ADMIN — INSULIN HUMAN 3 UNITS: 100 INJECTION, SOLUTION PARENTERAL at 08:32

## 2022-03-03 RX ADMIN — RIVAROXABAN 10 MG: 10 TABLET, FILM COATED ORAL at 08:27

## 2022-03-03 RX ADMIN — SERTRALINE 150 MG: 50 TABLET, FILM COATED ORAL at 20:46

## 2022-03-03 RX ADMIN — INSULIN HUMAN 5 UNITS: 100 INJECTION, SOLUTION PARENTERAL at 13:04

## 2022-03-03 RX ADMIN — DIVALPROEX SODIUM 1500 MG: 500 TABLET, DELAYED RELEASE ORAL at 20:46

## 2022-03-03 RX ADMIN — MONTELUKAST 10 MG: 10 TABLET, FILM COATED ORAL at 17:51

## 2022-03-03 RX ADMIN — INSULIN HUMAN 6 UNITS: 100 INJECTION, SOLUTION PARENTERAL at 20:42

## 2022-03-03 RX ADMIN — ATORVASTATIN CALCIUM 20 MG: 20 TABLET, FILM COATED ORAL at 17:52

## 2022-03-03 RX ADMIN — BUDESONIDE AND FORMOTEROL FUMARATE DIHYDRATE: 160; 4.5 AEROSOL RESPIRATORY (INHALATION) at 04:48

## 2022-03-03 RX ADMIN — LEVOTHYROXINE SODIUM 225 MCG: 0.07 TABLET ORAL at 04:43

## 2022-03-03 RX ADMIN — ACETAMINOPHEN 1000 MG: 500 TABLET ORAL at 13:04

## 2022-03-03 ASSESSMENT — ENCOUNTER SYMPTOMS
SEIZURES: 0
FEVER: 0
SORE THROAT: 1
SPEECH CHANGE: 1
VOMITING: 0
NAUSEA: 0
CHILLS: 0
SHORTNESS OF BREATH: 0
SENSORY CHANGE: 0
FOCAL WEAKNESS: 1
FALLS: 0
BLURRED VISION: 1
ABDOMINAL PAIN: 0
COUGH: 0

## 2022-03-03 ASSESSMENT — PAIN DESCRIPTION - PAIN TYPE
TYPE: ACUTE PAIN
TYPE: ACUTE PAIN

## 2022-03-03 NOTE — DISCHARGE PLANNING
Anticipated Discharge Disposition: Group home    Action: CM left a VMM with his  Philip from [facility] [phone] requesting placement assistance.  CM met with the patient to inquire on other group homes he has been to and informed him we may have to look for other group home placement.      Barriers to Discharge: placement    Plan: CM will continue to follow for placement into a group home.

## 2022-03-03 NOTE — PROGRESS NOTES
[facility] Medicine Daily Progress Note    Date of Service  3/3/2022    Chief Complaint  Norm Prabhakar is a 41 y.o. male admitted 2/26/2022 with right arm weakness and stuttering.    Hospital Course  No notes on file    Interval Problem Update  No acute events overnight.  Patient on do not admit list by all SNF's.  PMR consulted for rehab consideration.  If rejected by PMR, will rehab in house until functionally able to discharge home safely.    I have personally seen and examined the patient at bedside. I discussed the plan of care with patient.    Consultants/Specialty  neurology    Code Status  Full Code    Disposition  Patient is medically cleared for discharge.   Anticipate discharge to rehab vs home.  I have placed the appropriate orders for post-discharge needs.    Review of Systems  Review of Systems   Constitutional: Negative for chills and fever.   HENT: Positive for sore throat.    Eyes: Positive for blurred vision (vision loss of R eye chronically).   Respiratory: Negative for cough and shortness of breath.    Cardiovascular: Negative for chest pain and leg swelling.   Gastrointestinal: Negative for abdominal pain, nausea and vomiting.   Genitourinary: Negative for hematuria.   Musculoskeletal: Negative for falls.   Skin: Negative for rash.   Neurological: Positive for speech change and focal weakness. Negative for sensory change and seizures.        Physical Exam  Temp:  [36.1 °C (96.9 °F)-36.8 °C (98.2 °F)] 36.3 °C (97.4 °F)  Pulse:  [60-66] 60  Resp:  [15-18] 18  BP: (104-120)/(63-67) 104/65  SpO2:  [91 %-95 %] 91 %    Physical Exam  Vitals and nursing note reviewed.   Constitutional:       General: He is not in acute distress.     Appearance: He is obese. He is not ill-appearing, toxic-appearing or diaphoretic.   HENT:      Head: Normocephalic and atraumatic.      Right Ear: External ear normal.      Left Ear: External ear normal.      Nose: Nose normal.      Mouth/Throat:      Mouth: Mucous  membranes are moist.      Pharynx: Oropharynx is clear.   Eyes:      General: No scleral icterus.     Extraocular Movements: Extraocular movements intact.   Neck:      Thyroid: Thyroid mass present. No thyroid tenderness.   Cardiovascular:      Rate and Rhythm: Normal rate and regular rhythm.      Heart sounds: No murmur heard.  Pulmonary:      Effort: Pulmonary effort is normal.      Breath sounds: Normal breath sounds.   Abdominal:      General: Bowel sounds are normal.      Palpations: Abdomen is soft.      Tenderness: There is no abdominal tenderness. There is no guarding or rebound.   Musculoskeletal:         General: No swelling or deformity.   Skin:     Coloration: Skin is not jaundiced.      Findings: No bruising.   Neurological:      Mental Status: He is alert and oriented to person, place, and time.      Cranial Nerves: No cranial nerve deficit.      Sensory: No sensory deficit.      Motor: Weakness present.      Comments: 0/5 muscle strength on right upper extremity   Psychiatric:         Mood and Affect: Mood normal.         Behavior: Behavior normal.         Fluids    Intake/Output Summary (Last 24 hours) at 3/3/2022 1303  Last data filed at 3/3/2022 0900  Gross per 24 hour   Intake 980 ml   Output 3450 ml   Net -2470 ml       Laboratory                        Imaging  DX-CHEST-PORTABLE (1 VIEW)   Final Result      No acute cardiopulmonary abnormality.      CT-CTA NECK WITH & W/O-POST PROCESSING   Final Result      1.  No occlusion or hemodynamically significant stenosis of the neck arteries.   2.  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      CT-CTA HEAD WITH & W/O-POST PROCESS   Final Result      CT angiogram of the [place] within normal limits.      CT-CEREBRAL PERFUSION ANALYSIS   Final Result      1.  Cerebral blood flow less than 30% likely representing completed infarct = 0 mL.      2.  T Max more than 6 seconds likely representing  combination of completed infarct and ischemia = 0 mL.      3.  Mismatched volume likely representing ischemic brain/penumbra = None      4.  Please note that the cerebral perfusion was performed on the limited brain tissue around the basal ganglia region. Infarct/ischemia outside the CT perfusion sections can be missed in this study.      CT-HEAD W/O   Final Result      1.  Confluent cerebral white matter hypodensities again noted which may represent severe chronic leukoencephalopathy..   2.  No acute intracranial abnormality.              Assessment/Plan  * Acute right-sided weakness- (present on admission)  Assessment & Plan  Chronic recurrent issue, per our records he has had 19 MRI head since 2011 with the majority ordered for right-sided weakness  CT scans in the ED negative for acute abnormality, chronic moderate cerebellar loss  Patient says this is similar to his previous episodes and symptoms usually resolve in a few days or less  On exam 0/5 strength, however while talking to the patient his right toe and foot do move  Neurology evaluated in the ED and suggest possible conversion disorder  Patient reports it took ~ 1 week for weakness to improve after last episode  Patient refused by all SNFs  PMR consulted for possible rehab consideration, if rejected will rehab in house until functionally safe to return to group home.    Conversion disorder- (present on admission)  Assessment & Plan  Recurrent right-sided weakness, admitted again for this  Psych consult placed     Primary hypertension- (present on admission)  Assessment & Plan  Continue lisinopril    Mixed hyperlipidemia- (present on admission)  Assessment & Plan  Continue atorvastatin    Class 1 obesity due to excess calories with serious comorbidity and body mass index (BMI) of 32.0 to 32.9 in adult- (present on admission)  Assessment & Plan  BMI 32.7    Type 2 diabetes mellitus without complication, without long-term current use of insulin (HCC)-  (present on admission)  Assessment & Plan  Hold home orals.  Continue insulin glargine  Insulin sliding scale hypoglycemia protocol in the hospital    PTSD (post-traumatic stress disorder)- (present on admission)  Assessment & Plan  Continue sertraline and prazosin    History of seizure- (present on admission)  Assessment & Plan  Continue divalproex    Anxiety and depression- (present on admission)  Assessment & Plan  Continue sertraline    Postoperative hypothyroidism- (present on admission)  Assessment & Plan  Continue levothyroxine  Thyroid mass, h/o goiter  CT neck shows thyroid mass  TSH      Asthma- (present on admission)  Assessment & Plan  Continue montelukast and home inhaler       VTE prophylaxis: enoxaparin ppx    I have performed a physical exam and reviewed and updated ROS and Plan today (3/3/2022). In review of yesterday's note (3/2/2022), there are no changes except as documented above.

## 2022-03-03 NOTE — THERAPY
"Physical Therapy   Daily Treatment     Patient Name: Norm Prabhakar  Age:  41 y.o., Sex:  male  Medical Record #: [id number]  Today's Date: 3/2/2022     Precautions  Precautions: (P) Fall Risk    Assessment    Pt tolerates treatment session well, pt is agreeable, pleasant and cooperative, pt does not report pain during session, no LOB or SOB. Pt demonstrates improving strength and standing balance, requires decreased assistance for transfers and standing balance. Inconsistencies continue with R UE and LE strength; pt is unable to demonstrate AROM of R limbs, however pt actively uses UE to brace for balance during transfers, sitting EOB and standing at FWW; pt is able to weight bear full on R LE to take step with L LE, but is unable to demonstrate active ROM of R ankle or knee in sitting.      Plan    Continue current treatment plan.    DC Equipment Recommendations: (P) Unable to determine at this time  Discharge Recommendations: (P) Recommend post-acute placement for additional physical therapy services prior to discharge home      Subjective    \"my mom is bringing me snacks later.\"     03/02/22 1600   Precautions   Precautions Fall Risk   Pain 0 - 10 Group   Therapist Pain Assessment 0;Post Activity Pain Same as Prior to Activity   Cognition    Cognition / Consciousness X   Level of Consciousness Alert   Ability To Follow Commands 1 Step   Attention Impaired   Sequencing Impaired   Comments pt often requires multiple verbal cues to complete task, inconsistent manual muscle testing on R LE/UE   Strength Lower Body   Comments pt is able to weight bear to move L LE, but is not able to move R LE   Sitting Lower Body Exercises   Sitting Lower Body Exercises Yes   Ankle Pumps 1 set of 10   Long Arc Quad 1 set of 10   Comments attempted, pt grunts/groans and is unable to complete on RLE   Balance   Sitting Balance (Static) Fair +   Sitting Balance (Dynamic) Fair +   Standing Balance (Static) Poor +   Standing Balance " (Dynamic) Poor   Weight Shift Sitting Fair   Weight Shift Standing Fair   Skilled Intervention Verbal Cuing;Sequencing   Comments with FWW   Gait Analysis   Gait Level Of Assist Unable to Participate   Comments side steps with L LE, struggles to slide/move R LE in standing to side step/pivot from bed to chair   Bed Mobility    Supine to Sit Contact Guard Assist   Functional Mobility   Sit to Stand Contact Guard Assist  (with head of bed slightly elevated)   Bed, Chair, Wheelchair Transfer Contact Guard Assist   Skilled Intervention Verbal Cuing;Sequencing;Compensatory Strategies   Comments with FWW   How much difficulty does the patient currently have...   Turning over in bed (including adjusting bedclothes, sheets and blankets)? 1   Sitting down on and standing up from a chair with arms (e.g., wheelchair, bedside commode, etc.) 1   Moving from lying on back to sitting on the side of the bed? 1   How much help from another person does the patient currently need...   Moving to and from a bed to a chair (including a wheelchair)? 3   Need to walk in a hospital room? 1   Climbing 3-5 steps with a railing? 1   6 clicks Mobility Score 8   Activity Tolerance   Sitting in Chair left seated in chair, >10 min   Sitting Edge of Bed 10 min   Standing 2 min total   Short Term Goals    Short Term Goal # 1 supine to/sit EOB with mod I in 6 visits   Goal Outcome # 1 Progressing as expected   Short Term Goal # 2 sit to stand from bed/chair/toilet with SBA in 6 visits   Goal Outcome # 2 Progressing as expected   Short Term Goal # 3 ambulation with LRD 50 feet in 6 visits   Goal Outcome # 3 Goal not met   Education Group   Education Provided Transfer Status   Role of Physical Therapist Patient Response Patient;Acceptance;Explanation;Verbal Demonstration;Action Demonstration   Anticipated Discharge Equipment and Recommendations   DC Equipment Recommendations Unable to determine at this time   Discharge Recommendations Recommend  post-acute placement for additional physical therapy services prior to discharge home       JAY MonsonT

## 2022-03-03 NOTE — CONSULTS
[facility] PSYCHIATRIC FOLLOW-UP:(established)  *Reason for admission:    LKW 1900. Patient states sudden onset right sided weakness. Patient has no effort against gravity for R arm or R leg. Facial droop noted. Patient has stuttered speech and endorses HA      *Legal Hold Status: not applicable                S:  He states he is really trying to move his right side because he has a wedding soon. Supportive therapy.    O: Medical ROS (as pertinent):                      *Psychiatric Examination:   Vitals:   Vitals:    03/02/22 1453 03/02/22 1955 03/03/22 0336 03/03/22 0733   BP: 105/63 120/67 108/66 104/65   Pulse: 66 64 61 60   Resp: 18 18 15 18   Temp: 36.1 °C (96.9 °F) 36.4 °C (97.6 °F) 36.8 °C (98.2 °F) 36.3 °C (97.4 °F)   TempSrc: Temporal Temporal Temporal Temporal   SpO2: 95% 93% 91% 91%   Weight:       Height:            General Appearance:good eye contact  Musculoskeletal: R side does not move  Alert/Orientation. X 4  Attn/Concentration: intact  Fund of Knowledge:not tested  Memory recent/remote: grossly intact  Speech: stutter like  Language:  fluent  Thought Content: denies psychosis/SI/HI   Thought Process:   linear     Insight/Judgement: impaired  Mood: denies depression, anxiety  Affect: blunted     Current Medications:  Scheduled Medications   Medication Dose Frequency   • rivaroxaban  10 mg QDAY with Breakfast   • Pharmacy Consult Request  1 Each PHARMACY TO DOSE   • acetaminophen  1,000 mg Q6HRS   • senna-docusate  2 Tablet BID   • atorvastatin  20 mg Q EVENING   • budesonide-formoterol  2 Puff BID   • divalproex  1,500 mg QHS   • insulin GLARGINE  30 Units BID   • levothyroxine  225 mcg AM ES   • lisinopril  10 mg DAILY   • lurasidone  20 mg Q EVENING   • montelukast  10 mg Q EVENING   • prazosin  4 mg Q EVENING   • sertraline  150 mg DAILY   • insulin regular  2-9 Units 4X/DAY ACHS          *ASSESSMENT/RECOMENDATIONS:  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 Mood  "disorder unspc: hx of. Stable  - to include \"lonliness and boredom\"        3. R/O intellectual limitations  -  learning disability, special education  -speech cog: 3/11: moderate problems in attn. Otherwise unremarkable but \"..Pt has assistance w/ medication management and other IADLs in his group home. No further acute SLP needs are indicated at this time, though pt will benefit from either home health or outpatient SLP tx to address his exacerbated fluency disorder.\"  -limited reliability     4. PTSD: hx of: stable     5.  Medical  -as per 2/3/2021:  \"has been seen multiple times by psychiatry. He has a documented SZ disorder, likely intellectual disability and features of a conversion disorder.\"  -HTN  -DM II   -Hx of SZ disorder:depakote     -hypothyroidism  -R tingling and numbness of LE..    Medical:   -as per 2/3/2021:  \"has been seen multiple times by psychiatry. He has a documented SZ disorder, likely intellectual disability and features of a conversion disorder.\"  -HTN  -DM II   -Hx of SZ disorder:depakote     -hypothyroidism  -R tingling and numbness of LE.     Legal hold: not applicable     Medication and Other Recommendations: final orders as per Tx Tm  1. No new recommendations  2. Would benefit from therapy from [facility] if they have a therapist    Will continue to follow with you.       Discharge recommendations: per tx tm           "

## 2022-03-03 NOTE — CARE PLAN
The patient is     Shift Goals  Clinical Goals: neuro status improving, safety  Patient Goals: comfort  Family Goals: kristen    Progress made toward(s) clinical / shift goals:        Patient is not progressing towards the following goals:

## 2022-03-04 LAB
GLUCOSE BLD STRIP.AUTO-MCNC: 160 MG/DL (ref 65–99)
GLUCOSE BLD STRIP.AUTO-MCNC: 229 MG/DL (ref 65–99)
GLUCOSE BLD STRIP.AUTO-MCNC: 234 MG/DL (ref 65–99)
GLUCOSE BLD STRIP.AUTO-MCNC: 260 MG/DL (ref 65–99)
GLUCOSE BLD STRIP.AUTO-MCNC: 304 MG/DL (ref 65–99)

## 2022-03-04 PROCEDURE — 97116 GAIT TRAINING THERAPY: CPT

## 2022-03-04 PROCEDURE — 96372 THER/PROPH/DIAG INJ SC/IM: CPT

## 2022-03-04 PROCEDURE — 700102 HCHG RX REV CODE 250 W/ 637 OVERRIDE(OP): Performed by: STUDENT IN AN ORGANIZED HEALTH CARE EDUCATION/TRAINING PROGRAM

## 2022-03-04 PROCEDURE — 99224 PR SUBSEQUENT OBSERVATION CARE,LEVEL I: CPT | Performed by: STUDENT IN AN ORGANIZED HEALTH CARE EDUCATION/TRAINING PROGRAM

## 2022-03-04 PROCEDURE — A9270 NON-COVERED ITEM OR SERVICE: HCPCS | Performed by: STUDENT IN AN ORGANIZED HEALTH CARE EDUCATION/TRAINING PROGRAM

## 2022-03-04 PROCEDURE — 700102 HCHG RX REV CODE 250 W/ 637 OVERRIDE(OP): Performed by: INTERNAL MEDICINE

## 2022-03-04 PROCEDURE — A9270 NON-COVERED ITEM OR SERVICE: HCPCS | Performed by: INTERNAL MEDICINE

## 2022-03-04 PROCEDURE — 82962 GLUCOSE BLOOD TEST: CPT

## 2022-03-04 PROCEDURE — G0378 HOSPITAL OBSERVATION PER HR: HCPCS

## 2022-03-04 PROCEDURE — 97530 THERAPEUTIC ACTIVITIES: CPT

## 2022-03-04 PROCEDURE — 97535 SELF CARE MNGMENT TRAINING: CPT

## 2022-03-04 RX ADMIN — MONTELUKAST 10 MG: 10 TABLET, FILM COATED ORAL at 17:19

## 2022-03-04 RX ADMIN — RIVAROXABAN 10 MG: 10 TABLET, FILM COATED ORAL at 08:30

## 2022-03-04 RX ADMIN — SENNOSIDES AND DOCUSATE SODIUM 2 TABLET: 50; 8.6 TABLET ORAL at 05:17

## 2022-03-04 RX ADMIN — BUDESONIDE AND FORMOTEROL FUMARATE DIHYDRATE 2 PUFF: 160; 4.5 AEROSOL RESPIRATORY (INHALATION) at 05:17

## 2022-03-04 RX ADMIN — LEVOTHYROXINE SODIUM 225 MCG: 0.07 TABLET ORAL at 05:17

## 2022-03-04 RX ADMIN — INSULIN HUMAN 3 UNITS: 100 INJECTION, SOLUTION PARENTERAL at 17:24

## 2022-03-04 RX ADMIN — ACETAMINOPHEN 1000 MG: 500 TABLET ORAL at 08:30

## 2022-03-04 RX ADMIN — INSULIN GLARGINE 30 UNITS: 100 INJECTION, SOLUTION SUBCUTANEOUS at 17:24

## 2022-03-04 RX ADMIN — ATORVASTATIN CALCIUM 20 MG: 20 TABLET, FILM COATED ORAL at 17:19

## 2022-03-04 RX ADMIN — BUDESONIDE AND FORMOTEROL FUMARATE DIHYDRATE 2 PUFF: 160; 4.5 AEROSOL RESPIRATORY (INHALATION) at 17:22

## 2022-03-04 RX ADMIN — LURASIDONE HYDROCHLORIDE 20 MG: 20 TABLET, FILM COATED ORAL at 17:22

## 2022-03-04 RX ADMIN — INSULIN HUMAN 5 UNITS: 100 INJECTION, SOLUTION PARENTERAL at 20:39

## 2022-03-04 RX ADMIN — INSULIN HUMAN 2 UNITS: 100 INJECTION, SOLUTION PARENTERAL at 08:34

## 2022-03-04 RX ADMIN — SERTRALINE 150 MG: 50 TABLET, FILM COATED ORAL at 20:43

## 2022-03-04 RX ADMIN — LISINOPRIL 10 MG: 10 TABLET ORAL at 05:17

## 2022-03-04 RX ADMIN — DIVALPROEX SODIUM 1500 MG: 500 TABLET, DELAYED RELEASE ORAL at 20:43

## 2022-03-04 RX ADMIN — PRAZOSIN HYDROCHLORIDE 4 MG: 2 CAPSULE ORAL at 17:19

## 2022-03-04 RX ADMIN — INSULIN GLARGINE 30 UNITS: 100 INJECTION, SOLUTION SUBCUTANEOUS at 05:21

## 2022-03-04 RX ADMIN — INSULIN HUMAN 3 UNITS: 100 INJECTION, SOLUTION PARENTERAL at 12:49

## 2022-03-04 ASSESSMENT — COGNITIVE AND FUNCTIONAL STATUS - GENERAL
STANDING UP FROM CHAIR USING ARMS: A LITTLE
SUGGESTED CMS G CODE MODIFIER DAILY ACTIVITY: CK
SUGGESTED CMS G CODE MODIFIER MOBILITY: CM
DAILY ACTIVITIY SCORE: 18
DRESSING REGULAR LOWER BODY CLOTHING: A LITTLE
MOBILITY SCORE: 8
PERSONAL GROOMING: A LITTLE
TURNING FROM BACK TO SIDE WHILE IN FLAT BAD: UNABLE
CLIMB 3 TO 5 STEPS WITH RAILING: TOTAL
MOVING TO AND FROM BED TO CHAIR: UNABLE
WALKING IN HOSPITAL ROOM: TOTAL
MOVING FROM LYING ON BACK TO SITTING ON SIDE OF FLAT BED: UNABLE
TOILETING: A LITTLE
EATING MEALS: A LITTLE
DRESSING REGULAR UPPER BODY CLOTHING: A LITTLE
HELP NEEDED FOR BATHING: A LITTLE

## 2022-03-04 ASSESSMENT — GAIT ASSESSMENTS: GAIT LEVEL OF ASSIST: UNABLE TO PARTICIPATE

## 2022-03-04 ASSESSMENT — ENCOUNTER SYMPTOMS
NAUSEA: 0
VOMITING: 0
BLURRED VISION: 1
FEVER: 0
SENSORY CHANGE: 0
FALLS: 0
SORE THROAT: 1
FOCAL WEAKNESS: 1
SPEECH CHANGE: 1
CHILLS: 0
SHORTNESS OF BREATH: 0
SEIZURES: 0
COUGH: 0
ABDOMINAL PAIN: 0

## 2022-03-04 ASSESSMENT — PAIN DESCRIPTION - PAIN TYPE
TYPE: ACUTE PAIN
TYPE: ACUTE PAIN

## 2022-03-04 NOTE — PROGRESS NOTES
Assumed care of pt at 1900. Pt alert and oriented.  Reports mild leg pain.  BGL consistently elevated, soda, candy, chip wrappers in room.  Discussed diet needs in relation to sugar levels.  Bed low & locked, alarm on.  Reviewed plan for evening.  Hourly rounding continues.    0100: Pt moves extremities x4 while asleep.

## 2022-03-04 NOTE — THERAPY
Occupational Therapy  Daily Treatment     Patient Name: Norm Prabhakar  Age:  41 y.o., Sex:  male  Medical Record #: [id number]  Today's Date: 3/4/2022     Precautions  Precautions: (P) Fall Risk    Assessment    Pt seen for follow up OT tx session, pt making steady progress but continues to have inconsistent MMT for RUE as well as tends to ask for assistance versus attempting on own, encouraged patient to complete ADLs and transfers as independently as possible to gain independence, limited carryover noted from previous session. Will continue post-acute placement recommendation.    Plan    Continue current treatment plan.    DC Equipment Recommendations: (P) Unable to determine at this time  Discharge Recommendations: (P) Recommend post-acute placement for additional occupational therapy services prior to discharge home      Objective       03/04/22 1131   Precautions   Precautions Fall Risk   Cognition    Comments Inconsistent MMT, cues needed for re-direction back to task, easily distracted   Active ROM Upper Body   Active ROM Upper Body  X   Dominant Hand Right   Flaccid Upper Extremity Right Upper Extremity Flaccid   Balance   Sitting Balance (Static) Fair +   Sitting Balance (Dynamic) Fair +   Standing Balance (Static) Poor +   Standing Balance (Dynamic) Poor   Weight Shift Sitting Fair   Weight Shift Standing Fair   Skilled Intervention Verbal Cuing;Facilitation   Comments w/ FWW   Bed Mobility    Supine to Sit Contact Guard Assist   Scooting Contact Guard Assist   Skilled Intervention Verbal Cuing   Comments More time and encouragement to complete on own versus require assistance   Activities of Daily Living   Eating Supervision   Upper Body Dressing Minimal Assist   Lower Body Dressing Minimal Assist   Toileting   (NT-refused)   Skilled Intervention Verbal Cuing   How much help from another person does the patient currently need...   Putting on and taking off regular lower body clothing? 3   Bathing  (including washing, rinsing, and drying)? 3   Toileting, which includes using a toilet, bedpan, or urinal? 3   Putting on and taking off regular upper body clothing? 3   Taking care of personal grooming such as brushing teeth? 3   Eating meals? 3   6 Clicks Daily Activity Score 18   Functional Mobility   Sit to Stand Contact Guard Assist   Bed, Chair, Wheelchair Transfer Contact Guard Assist   Toilet Transfers Refused   Transfer Method Stand Pivot   Mobility bed mobility, STS x2 at EOB, transfer to chair   Skilled Intervention Verbal Cuing;Facilitation   Comments w/ FWW   Activity Tolerance   Sitting in Chair left seated in chair   Sitting Edge of Bed 15 min   Standing 5 min   Short Term Goals   Short Term Goal # 1 Pt will complete ADL transfers with supervision   Goal Outcome # 1 Progressing as expected   Short Term Goal # 2 Pt will complete LB dressing with supervision   Goal Outcome # 2 Progressing as expected   Short Term Goal # 3 Pt will complete toileting with supervision   Goal Outcome # 3 Goal not met   Education Group   Education Provided Role of Occupational Therapist   Role of Occupational Therapist Patient Response Patient;Acceptance;Explanation;Action Demonstration   Anticipated Discharge Equipment and Recommendations   DC Equipment Recommendations Unable to determine at this time   Discharge Recommendations Recommend post-acute placement for additional occupational therapy services prior to discharge home   Interdisciplinary Plan of Care Collaboration   IDT Collaboration with  Nursing   Patient Position at End of Therapy Seated;Chair Alarm On;Call Light within Reach;Tray Table within Reach;Phone within Reach   Collaboration Comments RN updated

## 2022-03-04 NOTE — CARE PLAN
The patient is Stable - Low risk of patient condition declining or worsening    Shift Goals  Clinical Goals: BGL monitoring  Patient Goals: Movies  Family Goals: JUANCARLOS    Progress made toward(s) clinical / shift goals:    Problem: Skin Integrity  Goal: Skin integrity is maintained or improved  Outcome: Progressing     Problem: Pain - Standard  Goal: Alleviation of pain or a reduction in pain to the patient’s comfort goal  Outcome: Progressing     Problem: Neuro Status  Goal: Neuro status will remain stable or improve  Outcome: Progressing       Patient is not progressing towards the following goals:

## 2022-03-04 NOTE — DISCHARGE PLANNING
Norm is lacking sufficient D/C resources/support.  [facility] will no longer follow.  Please reach out to myself with any questions.

## 2022-03-04 NOTE — CARE PLAN
The patient is Stable - Low risk of patient condition declining or worsening    Shift Goals  Clinical Goals: neuro status improving  Patient Goals: comfort  Family Goals: kristen    Progress made toward(s) clinical / shift goals:      Patient is not progressing towards the following goals: R side  is still weak, unable to lift up his R arm and R leg

## 2022-03-04 NOTE — THERAPY
"Physical Therapy   Daily Treatment     Patient Name: Norm Prabhakar  Age:  41 y.o., Sex:  male  Medical Record #: [id number]  Today's Date: 3/4/2022     Precautions  Precautions: Fall Risk    Assessment    Pt tolerates treatment session well, no pain, no LOB or SOB. Pt demonstrates improving R LE and UE strength/mobility, decreased assistance required for transfers. Pt continues to demonstrate inconsistent strength pattern for active ROM versus strength of R LE: pt is able to full weight bear on R LE while participating in standing marching and side stepping with L LE, but is unable to demonstrate active mobility of R LE when asked.      Plan    Continue current treatment plan.    DC Equipment Recommendations: (P) Unable to determine at this time  Discharge Recommendations: (P) Recommend post-acute placement for additional physical therapy services prior to discharge home      Subjective    \"I'm looking at [employer] videos. I'm addicted.\"       03/04/22 1114   Precautions   Precautions Fall Risk   Pain 0 - 10 Group   Therapist Pain Assessment 0;Post Activity Pain Same as Prior to Activity   Cognition    Cognition / Consciousness X   Level of Consciousness Alert   Ability To Follow Commands 1 Step   Attention Impaired   Sequencing Impaired   Strength Lower Body   Lower Body Strength  X   Comments pt demonstrates inconsistent strength of R LE   Standing Lower Body Exercises   Standing Lower Body Exercises Yes   Comments pt able to demonstrate march and toe rise with L LE but not with R LE   Balance   Sitting Balance (Static) Fair +   Sitting Balance (Dynamic) Fair +   Standing Balance (Static) Fair -   Standing Balance (Dynamic) Poor +   Weight Shift Sitting Fair   Weight Shift Standing Fair   Skilled Intervention Verbal Cuing;Facilitation   Comments with FWW   Gait Analysis   Gait Level Of Assist Unable to Participate   Comments side steps from HOB to chair; is able to move L LE but unable to move R LE   Bed Mobility  "   Supine to Sit Contact Guard Assist   Scooting Standby Assist   Functional Mobility   Sit to Stand Contact Guard Assist   Bed, Chair, Wheelchair Transfer Contact Guard Assist   Mobility supine to sit, sit to stand from bed and chair, stand pivot/step bed to chair   Skilled Intervention Verbal Cuing   Comments with FWW   How much difficulty does the patient currently have...   Turning over in bed (including adjusting bedclothes, sheets and blankets)? 1   Sitting down on and standing up from a chair with arms (e.g., wheelchair, bedside commode, etc.) 1   Moving from lying on back to sitting on the side of the bed? 1   How much help from another person does the patient currently need...   Moving to and from a bed to a chair (including a wheelchair)? 3   Need to walk in a hospital room? 1   Climbing 3-5 steps with a railing? 1   6 clicks Mobility Score 8   Activity Tolerance   Sitting in Chair left seated in chair   Sitting Edge of Bed 10 min   Standing 5 min   Short Term Goals    Short Term Goal # 1 supine to/sit EOB with mod I in 6 visits   Goal Outcome # 1 Progressing as expected   Short Term Goal # 2 sit to stand from bed/chair/toilet with SBA in 6 visits   Goal Outcome # 2 Progressing as expected   Short Term Goal # 3 ambulation with LRD 50 feet in 6 visits   Goal Outcome # 3 Goal not met   Education Group   Education Provided Exercises - Standing;Transfer Status   Role of Physical Therapist Patient Response Patient;Acceptance;Explanation;Verbal Demonstration   Exercise - Standing Patient Response Patient;Acceptance;Explanation;Verbal Demonstration;Action Demonstration   Transfer Status Patient Response Patient;Acceptance;Explanation;Verbal Demonstration;Action Demonstration;Reinforcement Needed   Anticipated Discharge Equipment and Recommendations   DC Equipment Recommendations Unable to determine at this time   Discharge Recommendations Recommend post-acute placement for additional physical therapy services  prior to discharge home       Lola Mcrae DPT

## 2022-03-04 NOTE — DISCHARGE PLANNING
Anticipated Discharge Disposition: Group home     Action: CM spoke with the patients  Philip from [facility] [phone] requesting placement assistance.  She states the housing they have available only assists with mental health, nothing physical. CM inquired of other group homes; [facility] (patient has to be indep), [facility] and [facility] (both are out of business)    Barriers to Discharge: placement     Plan: CM will continue to follow for placement into another group home or the patient will have to remain in house until he is able to ambulate independently.

## 2022-03-04 NOTE — PROGRESS NOTES
[facility] Medicine Daily Progress Note    Date of Service  3/4/2022    Chief Complaint  Norm Prabhakar is a 41 y.o. male admitted 2/26/2022 with right arm weakness and stuttering.    Hospital Course  No notes on file    Interval Problem Update  No acute events overnight.  Patient on do not admit list by all SNF's.  Not a rehab candidate.  Patient's group home will not accept patient back unless completely independent which patient is not currently due to weakness.  Rehab in house until functionally able to discharge home safely.  SW working on other group home placement as well.  Patient is medically cleared for discharge to group home.    I have personally seen and examined the patient at bedside. I discussed the plan of care with patient.    Consultants/Specialty  neurology    Code Status  Full Code    Disposition  Patient is medically cleared for discharge.   Anticipate discharge to group home.  I have placed the appropriate orders for post-discharge needs.    Review of Systems  Review of Systems   Constitutional: Negative for chills and fever.   HENT: Positive for sore throat.    Eyes: Positive for blurred vision (vision loss of R eye chronically).   Respiratory: Negative for cough and shortness of breath.    Cardiovascular: Negative for chest pain and leg swelling.   Gastrointestinal: Negative for abdominal pain, nausea and vomiting.   Genitourinary: Negative for hematuria.   Musculoskeletal: Negative for falls.   Skin: Negative for rash.   Neurological: Positive for speech change and focal weakness. Negative for sensory change and seizures.        Physical Exam  Temp:  [35.9 °C (96.6 °F)-36.5 °C (97.7 °F)] 35.9 °C (96.6 °F)  Pulse:  [] 58  Resp:  [18] 18  BP: (110-122)/(63-80) 110/64  SpO2:  [93 %-96 %] 96 %    Physical Exam  Vitals and nursing note reviewed.   Constitutional:       General: He is not in acute distress.     Appearance: He is obese. He is not ill-appearing, toxic-appearing or  diaphoretic.   HENT:      Head: Normocephalic and atraumatic.      Right Ear: External ear normal.      Left Ear: External ear normal.      Nose: Nose normal.      Mouth/Throat:      Mouth: Mucous membranes are moist.      Pharynx: Oropharynx is clear.   Eyes:      General: No scleral icterus.     Extraocular Movements: Extraocular movements intact.   Neck:      Thyroid: Thyroid mass present. No thyroid tenderness.   Cardiovascular:      Rate and Rhythm: Normal rate and regular rhythm.      Heart sounds: No murmur heard.  Pulmonary:      Effort: Pulmonary effort is normal.      Breath sounds: Normal breath sounds.   Abdominal:      General: Bowel sounds are normal.      Palpations: Abdomen is soft.      Tenderness: There is no abdominal tenderness. There is no guarding or rebound.   Musculoskeletal:         General: No swelling or deformity.   Skin:     Coloration: Skin is not jaundiced.      Findings: No bruising.   Neurological:      Mental Status: He is alert and oriented to person, place, and time.      Cranial Nerves: No cranial nerve deficit.      Sensory: No sensory deficit.      Motor: Weakness present.      Comments: 0/5 muscle strength on right upper extremity   Psychiatric:         Mood and Affect: Mood normal.         Behavior: Behavior normal.         Fluids    Intake/Output Summary (Last 24 hours) at 3/4/2022 1306  Last data filed at 3/3/2022 1533  Gross per 24 hour   Intake --   Output 900 ml   Net -900 ml       Laboratory                        Imaging  DX-CHEST-PORTABLE (1 VIEW)   Final Result      No acute cardiopulmonary abnormality.      CT-CTA NECK WITH & W/O-POST PROCESSING   Final Result      1.  No occlusion or hemodynamically significant stenosis of the neck arteries.   2.  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      CT-CTA HEAD WITH & W/O-POST PROCESS   Final Result      CT angiogram of the [place] within normal limits.       CT-CEREBRAL PERFUSION ANALYSIS   Final Result      1.  Cerebral blood flow less than 30% likely representing completed infarct = 0 mL.      2.  T Max more than 6 seconds likely representing combination of completed infarct and ischemia = 0 mL.      3.  Mismatched volume likely representing ischemic brain/penumbra = None      4.  Please note that the cerebral perfusion was performed on the limited brain tissue around the basal ganglia region. Infarct/ischemia outside the CT perfusion sections can be missed in this study.      CT-HEAD W/O   Final Result      1.  Confluent cerebral white matter hypodensities again noted which may represent severe chronic leukoencephalopathy..   2.  No acute intracranial abnormality.              Assessment/Plan  * Acute right-sided weakness- (present on admission)  Assessment & Plan  Chronic recurrent issue, per our records he has had 19 MRI head since 2011 with the majority ordered for right-sided weakness  CT scans in the ED negative for acute abnormality, chronic moderate cerebellar loss  Patient says this is similar to his previous episodes and symptoms usually resolve in a few days or less  On exam 0/5 strength, however while talking to the patient his right toe and foot do move  Weakness likely due to conversion disorder  Psychiatry following  Patient refused by all SNFs, not a rehab candidate  Medically cleared for discharge to group home, G[facility]Y working on group home placement    Conversion disorder- (present on admission)  Assessment & Plan  Recurrent right-sided weakness, admitted again for this  Psych consult placed     Primary hypertension- (present on admission)  Assessment & Plan  Continue lisinopril    Mixed hyperlipidemia- (present on admission)  Assessment & Plan  Continue atorvastatin    Class 1 obesity due to excess calories with serious comorbidity and body mass index (BMI) of 32.0 to 32.9 in adult- (present on admission)  Assessment & Plan  BMI 32.7    Type 2  diabetes mellitus without complication, without long-term current use of insulin (HCC)- (present on admission)  Assessment & Plan  Hold home orals.  Continue insulin glargine  Insulin sliding scale hypoglycemia protocol in the hospital    PTSD (post-traumatic stress disorder)- (present on admission)  Assessment & Plan  Continue sertraline and prazosin    History of seizure- (present on admission)  Assessment & Plan  Continue divalproex    Anxiety and depression- (present on admission)  Assessment & Plan  Continue sertraline    Postoperative hypothyroidism- (present on admission)  Assessment & Plan  Continue levothyroxine  Thyroid mass, h/o goiter  CT neck shows thyroid mass  TSH      Asthma- (present on admission)  Assessment & Plan  Continue montelukast and home inhaler       VTE prophylaxis: enoxaparin ppx    I have performed a physical exam and reviewed and updated ROS and Plan today (3/4/2022). In review of yesterday's note (3/3/2022), there are no changes except as documented above.

## 2022-03-04 NOTE — CONSULTS
"  [facility] PSYCHIATRIC FOLLOW-UP:(established)  *Reason for admission:  LKW 1900. Patient states sudden onset right sided weakness. Patient has no effort against gravity for R arm or R leg. Facial droop noted. Patient has stuttered speech and endorses HA  *Legal Hold Status: not applicable                S:  Out to PT/OT. Chart reviewed    O: Medical ROS (as pertinent):                      *Psychiatric Examination: unable to assess as he is not present  Vitals:   Vitals:    03/03/22 1945 03/03/22 2045 03/04/22 0517 03/04/22 0738   BP: 122/69 112/65 112/80 110/64   Pulse: 100 69  (!) 58   Resp: 18 18  18   Temp: 36.5 °C (97.7 °F) 36.5 °C (97.7 °F)  35.9 °C (96.6 °F)   TempSrc: Temporal Temporal  Temporal   SpO2: 93% 93%  96%   Weight:       Height:           Current Medications:  Scheduled Medications   Medication Dose Frequency   • rivaroxaban  10 mg QDAY with Breakfast   • Pharmacy Consult Request  1 Each PHARMACY TO DOSE   • acetaminophen  1,000 mg Q6HRS   • senna-docusate  2 Tablet BID   • atorvastatin  20 mg Q EVENING   • budesonide-formoterol  2 Puff BID   • divalproex  1,500 mg QHS   • insulin GLARGINE  30 Units BID   • levothyroxine  225 mcg AM ES   • lisinopril  10 mg DAILY   • lurasidone  20 mg Q EVENING   • montelukast  10 mg Q EVENING   • prazosin  4 mg Q EVENING   • sertraline  150 mg DAILY   • insulin regular  2-9 Units 4X/DAY ACHS   Results for HOLLY KIM (MRN [id number]) as of 3/4/2022 13:46   Ref. Range 2/27/2022 05:13   TSH Latest Ref Range: 0.380 - 5.330 uIU/mL 8.100 (H)   Free T-4 Latest Ref Range: 0.93 - 1.70 ng/dL 1.36          *ASSESSMENT/RECOMENDATIONS:  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 Mood disorder unspc: hx of. Stable  - to include \"lonliness and boredom\"        3. R/O intellectual limitations  -  learning disability, special education  -speech cog: 3/11: moderate problems in attn. Otherwise unremarkable but \"..Pt has assistance w/ medication " "management and other IADLs in his group home. No further acute SLP needs are indicated at this time, though pt will benefit from either home health or outpatient SLP tx to address his exacerbated fluency disorder.\"  -limited reliability     4. PTSD: hx of: stable     5.  Medical  -as per 2/3/2021:  \"has been seen multiple times by psychiatry. He has a documented SZ disorder, likely intellectual disability and features of a conversion disorder.\"  -HTN  -DM II   -Hx of SZ disorder:depakote     -hypothyroidism  -R tingling and numbness of LE..  -thyroid mass  -hypthyroidism     Legal hold: not applicable     Medication and Other Recommendations: final orders as per Tx Tm  1. No new recommendations  2. Would benefit from therapy from [facility] if they have a therapist     Discharge recommendations: Patient's group home will not accept patient back unless completely independent which patient is not currently due to weakness.           "

## 2022-03-05 LAB
GLUCOSE BLD STRIP.AUTO-MCNC: 157 MG/DL (ref 65–99)
GLUCOSE BLD STRIP.AUTO-MCNC: 161 MG/DL (ref 65–99)
GLUCOSE BLD STRIP.AUTO-MCNC: 177 MG/DL (ref 65–99)
GLUCOSE BLD STRIP.AUTO-MCNC: 214 MG/DL (ref 65–99)

## 2022-03-05 PROCEDURE — G0378 HOSPITAL OBSERVATION PER HR: HCPCS

## 2022-03-05 PROCEDURE — 700102 HCHG RX REV CODE 250 W/ 637 OVERRIDE(OP): Performed by: STUDENT IN AN ORGANIZED HEALTH CARE EDUCATION/TRAINING PROGRAM

## 2022-03-05 PROCEDURE — 96372 THER/PROPH/DIAG INJ SC/IM: CPT | Mod: XU

## 2022-03-05 PROCEDURE — A9270 NON-COVERED ITEM OR SERVICE: HCPCS | Performed by: STUDENT IN AN ORGANIZED HEALTH CARE EDUCATION/TRAINING PROGRAM

## 2022-03-05 PROCEDURE — 99224 PR SUBSEQUENT OBSERVATION CARE,LEVEL I: CPT | Performed by: STUDENT IN AN ORGANIZED HEALTH CARE EDUCATION/TRAINING PROGRAM

## 2022-03-05 PROCEDURE — 82962 GLUCOSE BLOOD TEST: CPT | Mod: 91

## 2022-03-05 RX ADMIN — INSULIN HUMAN 2 UNITS: 100 INJECTION, SOLUTION PARENTERAL at 17:17

## 2022-03-05 RX ADMIN — INSULIN HUMAN 2 UNITS: 100 INJECTION, SOLUTION PARENTERAL at 08:53

## 2022-03-05 RX ADMIN — ATORVASTATIN CALCIUM 20 MG: 20 TABLET, FILM COATED ORAL at 17:04

## 2022-03-05 RX ADMIN — LEVOTHYROXINE SODIUM 225 MCG: 0.07 TABLET ORAL at 04:43

## 2022-03-05 RX ADMIN — BUDESONIDE AND FORMOTEROL FUMARATE DIHYDRATE 2 PUFF: 160; 4.5 AEROSOL RESPIRATORY (INHALATION) at 04:44

## 2022-03-05 RX ADMIN — INSULIN HUMAN 2 UNITS: 100 INJECTION, SOLUTION PARENTERAL at 13:10

## 2022-03-05 RX ADMIN — MONTELUKAST 10 MG: 10 TABLET, FILM COATED ORAL at 17:04

## 2022-03-05 RX ADMIN — PRAZOSIN HYDROCHLORIDE 4 MG: 2 CAPSULE ORAL at 17:04

## 2022-03-05 RX ADMIN — RIVAROXABAN 10 MG: 10 TABLET, FILM COATED ORAL at 08:55

## 2022-03-05 RX ADMIN — SERTRALINE 150 MG: 50 TABLET, FILM COATED ORAL at 21:23

## 2022-03-05 RX ADMIN — INSULIN GLARGINE 30 UNITS: 100 INJECTION, SOLUTION SUBCUTANEOUS at 17:10

## 2022-03-05 RX ADMIN — INSULIN HUMAN 3 UNITS: 100 INJECTION, SOLUTION PARENTERAL at 21:20

## 2022-03-05 RX ADMIN — LISINOPRIL 10 MG: 10 TABLET ORAL at 04:44

## 2022-03-05 RX ADMIN — INSULIN GLARGINE 30 UNITS: 100 INJECTION, SOLUTION SUBCUTANEOUS at 04:45

## 2022-03-05 RX ADMIN — DIVALPROEX SODIUM 1500 MG: 500 TABLET, DELAYED RELEASE ORAL at 21:23

## 2022-03-05 RX ADMIN — BUDESONIDE AND FORMOTEROL FUMARATE DIHYDRATE 2 PUFF: 160; 4.5 AEROSOL RESPIRATORY (INHALATION) at 17:04

## 2022-03-05 RX ADMIN — LURASIDONE HYDROCHLORIDE 20 MG: 20 TABLET, FILM COATED ORAL at 17:04

## 2022-03-05 ASSESSMENT — ENCOUNTER SYMPTOMS
SEIZURES: 0
SPEECH CHANGE: 1
FEVER: 0
BLURRED VISION: 1
ABDOMINAL PAIN: 0
VOMITING: 0
FALLS: 0
COUGH: 0
SENSORY CHANGE: 0
SHORTNESS OF BREATH: 0
CHILLS: 0
FOCAL WEAKNESS: 1
SORE THROAT: 1
NAUSEA: 0

## 2022-03-05 ASSESSMENT — PAIN DESCRIPTION - PAIN TYPE
TYPE: ACUTE PAIN
TYPE: ACUTE PAIN

## 2022-03-05 NOTE — PROGRESS NOTES
Assumed care of pt at 1900. Pt alert and oriented.  Denies pain or discomfort.  Improved motor function and sensation in right extremities tonight, able to close hand and move foot at ankle upon command.  Bed low & locked, alarm on.  Reviewed plan for evening.  Hourly rounding continues.

## 2022-03-05 NOTE — PROGRESS NOTES
[facility] Medicine Daily Progress Note    Date of Service  3/5/2022    Chief Complaint  Norm Prabhakar is a 41 y.o. male admitted 2/26/2022 with right arm weakness and stuttering.    Hospital Course  No notes on file    Interval Problem Update  No acute events overnight.  Patient on do not admit list by all SNF's.  Not a rehab candidate.  Patient reports some improvement in right sided weakness.  Right LE 3/5 strength, UE  1/5 strength.  Continue rehabbing in house.  Group home placement pending.  Patient is medically cleared for discharge to group home.    I have personally seen and examined the patient at bedside. I discussed the plan of care with patient.    Consultants/Specialty  neurology    Code Status  Full Code    Disposition  Patient is medically cleared for discharge.   Anticipate discharge to group home.  I have placed the appropriate orders for post-discharge needs.    Review of Systems  Review of Systems   Constitutional: Negative for chills and fever.   HENT: Positive for sore throat.    Eyes: Positive for blurred vision (vision loss of R eye chronically).   Respiratory: Negative for cough and shortness of breath.    Cardiovascular: Negative for chest pain and leg swelling.   Gastrointestinal: Negative for abdominal pain, nausea and vomiting.   Genitourinary: Negative for hematuria.   Musculoskeletal: Negative for falls.   Skin: Negative for rash.   Neurological: Positive for speech change and focal weakness. Negative for sensory change and seizures.        Physical Exam  Temp:  [36.1 °C (97 °F)-36.5 °C (97.7 °F)] 36.1 °C (97 °F)  Pulse:  [54-84] 56  Resp:  [17-18] 17  BP: (102-122)/(56-77) 122/76  SpO2:  [93 %-96 %] 96 %    Physical Exam  Vitals and nursing note reviewed.   Constitutional:       General: He is not in acute distress.     Appearance: He is obese. He is not ill-appearing, toxic-appearing or diaphoretic.   HENT:      Head: Normocephalic and atraumatic.      Right Ear: External ear  normal.      Left Ear: External ear normal.      Nose: Nose normal.      Mouth/Throat:      Mouth: Mucous membranes are moist.      Pharynx: Oropharynx is clear.   Eyes:      General: No scleral icterus.     Extraocular Movements: Extraocular movements intact.   Neck:      Thyroid: Thyroid mass present. No thyroid tenderness.   Cardiovascular:      Rate and Rhythm: Normal rate and regular rhythm.      Heart sounds: No murmur heard.  Pulmonary:      Effort: Pulmonary effort is normal.      Breath sounds: Normal breath sounds.   Abdominal:      General: Bowel sounds are normal.      Palpations: Abdomen is soft.      Tenderness: There is no abdominal tenderness. There is no guarding or rebound.   Musculoskeletal:         General: No swelling or deformity.   Skin:     Coloration: Skin is not jaundiced.      Findings: No bruising.   Neurological:      Mental Status: He is alert and oriented to person, place, and time.      Cranial Nerves: No cranial nerve deficit.      Sensory: No sensory deficit.      Motor: Weakness present.      Comments: RUE 1/5 strength, RLE 3/5 strength   Psychiatric:         Mood and Affect: Mood normal.         Behavior: Behavior normal.         Fluids    Intake/Output Summary (Last 24 hours) at 3/5/2022 1222  Last data filed at 3/5/2022 0800  Gross per 24 hour   Intake 250 ml   Output 825 ml   Net -575 ml       Laboratory                        Imaging  DX-CHEST-PORTABLE (1 VIEW)   Final Result      No acute cardiopulmonary abnormality.      CT-CTA NECK WITH & W/O-POST PROCESSING   Final Result      1.  No occlusion or hemodynamically significant stenosis of the neck arteries.   2.  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      CT-CTA HEAD WITH & W/O-POST PROCESS   Final Result      CT angiogram of the [place] within normal limits.      CT-CEREBRAL PERFUSION ANALYSIS   Final Result      1.  Cerebral blood flow less than 30% likely  representing completed infarct = 0 mL.      2.  T Max more than 6 seconds likely representing combination of completed infarct and ischemia = 0 mL.      3.  Mismatched volume likely representing ischemic brain/penumbra = None      4.  Please note that the cerebral perfusion was performed on the limited brain tissue around the basal ganglia region. Infarct/ischemia outside the CT perfusion sections can be missed in this study.      CT-HEAD W/O   Final Result      1.  Confluent cerebral white matter hypodensities again noted which may represent severe chronic leukoencephalopathy..   2.  No acute intracranial abnormality.              Assessment/Plan  * Acute right-sided weakness- (present on admission)  Assessment & Plan  Chronic recurrent issue, per our records he has had 19 MRI head since 2011 with the majority ordered for right-sided weakness  CT scans in the ED negative for acute abnormality, chronic moderate cerebellar loss  Patient says this is similar to his previous episodes and symptoms usually resolve in a few days or less  On exam 0/5 strength, however while talking to the patient his right toe and foot do move  Weakness likely due to conversion disorder  Psychiatry following  Patient refused by all SNFs, not a rehab candidate  Medically cleared for discharge to group home, [facility] working on group home placement    Conversion disorder- (present on admission)  Assessment & Plan  Recurrent right-sided weakness, admitted again for this  Psych consult placed     Primary hypertension- (present on admission)  Assessment & Plan  Continue lisinopril    Mixed hyperlipidemia- (present on admission)  Assessment & Plan  Continue atorvastatin    Class 1 obesity due to excess calories with serious comorbidity and body mass index (BMI) of 32.0 to 32.9 in adult- (present on admission)  Assessment & Plan  BMI 32.7    Type 2 diabetes mellitus without complication, without long-term current use of insulin (HCC)- (present on  admission)  Assessment & Plan  Hold home orals.  Continue insulin glargine  Insulin sliding scale hypoglycemia protocol in the hospital    PTSD (post-traumatic stress disorder)- (present on admission)  Assessment & Plan  Continue sertraline and prazosin    History of seizure- (present on admission)  Assessment & Plan  Continue divalproex    Anxiety and depression- (present on admission)  Assessment & Plan  Continue sertraline    Postoperative hypothyroidism- (present on admission)  Assessment & Plan  Continue levothyroxine  Thyroid mass, h/o goiter  CT neck shows thyroid mass  TSH      Asthma- (present on admission)  Assessment & Plan  Continue montelukast and home inhaler       VTE prophylaxis: enoxaparin ppx    I have performed a physical exam and reviewed and updated ROS and Plan today (3/5/2022). In review of yesterday's note (3/4/2022), there are no changes except as documented above.

## 2022-03-05 NOTE — CARE PLAN
The patient is Stable - Low risk of patient condition declining or worsening    Shift Goals  Clinical Goals: BGL monitorning  Patient Goals: Movies  Family Goals: JUANCARLOS    Progress made toward(s) clinical / shift goals:    Problem: Skin Integrity  Goal: Skin integrity is maintained or improved  Outcome: Progressing     Problem: Pain - Standard  Goal: Alleviation of pain or a reduction in pain to the patient’s comfort goal  Outcome: Progressing     Problem: Neuro Status  Goal: Neuro status will remain stable or improve  Outcome: Progressing     Problem: Mobility  Goal: Patient's capacity to carry out activities will improve  Outcome: Progressing       Patient is not progressing towards the following goals:

## 2022-03-05 NOTE — CONSULTS
[facility] PSYCHIATRIC FOLLOW-UP:(established)  *Reason for admission:  LKW 1900. Patient states sudden onset right sided weakness. Patient has no effort against gravity for R arm or R leg. Facial droop noted. Patient has stuttered speech and endorses HA   *Legal Hold Status: not applicable                S:  Says he is getting some movement back. Not SI/HI/depressed/anxious/or psychotic.     O: Medical ROS (as pertinent):                      *Psychiatric Examination:   Vitals:   Vitals:    03/04/22 1605 03/04/22 1953 03/05/22 0339 03/05/22 0716   BP: 122/77 102/56 107/59 122/76   Pulse: 64 84 (!) 54 (!) 56   Resp: 18 18 18 17   Temp: 36.2 °C (97.2 °F) 36.2 °C (97.2 °F) 36.5 °C (97.7 °F) 36.1 °C (97 °F)   TempSrc: Temporal Temporal Temporal Temporal   SpO2: 95% 95% 93% 96%   Weight:       Height:         General Appearance:good eye contact  Musculoskeletal: lying on L side with his R leg resting on top of it.   Alert/Orientation. X 4  Attn/Concentration: intact  Fund of Knowledge:not tested  Memory recent/remote: grossly intact  Speech: stutter like  Language:  fluent  Thought Content: denies psychosis/SI/HI   Thought Process:   linear     Insight/Judgement: impaired  Mood: denies depression, anxiety  Affect: blunted      Current Medications:  Scheduled Medications   Medication Dose Frequency   • rivaroxaban  10 mg QDAY with Breakfast   • Pharmacy Consult Request  1 Each PHARMACY TO DOSE   • senna-docusate  2 Tablet BID   • atorvastatin  20 mg Q EVENING   • budesonide-formoterol  2 Puff BID   • divalproex  1,500 mg QHS   • insulin GLARGINE  30 Units BID   • levothyroxine  225 mcg AM ES   • lisinopril  10 mg DAILY   • lurasidone  20 mg Q EVENING   • montelukast  10 mg Q EVENING   • prazosin  4 mg Q EVENING   • sertraline  150 mg DAILY   • insulin regular  2-9 Units 4X/DAY ACHS          *ASSESSMENT/RECOMENDATIONS:  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 Mood disorder unspc: hx of.  "Stable  - to include \"lonliness and boredom\"        3. R/O intellectual limitations  -  learning disability, special education  -speech cog: 3/11: moderate problems in attn. Otherwise unremarkable but \"..Pt has assistance w/ medication management and other IADLs in his group home. No further acute SLP needs are indicated at this time, though pt will benefit from either home health or outpatient SLP tx to address his exacerbated fluency disorder.\"  -limited reliability     4. PTSD: hx of: stable     5.  Medical  -as per 2/3/2021:  \"has been seen multiple times by psychiatry. He has a documented SZ disorder, likely intellectual disability and features of a conversion disorder.\"  -HTN  -DM II   -Hx of SZ disorder:depakote     -hypothyroidism  -R tingling and numbness of LE..     Medical:   -as per 2/3/2021:  \"has been seen multiple times by psychiatry. He has a documented SZ disorder, likely intellectual disability and features of a conversion disorder.\"  -HTN  -DM II   -Hx of SZ disorder:depakote     -hypothyroidism  -R tingling and numbness of LE.      Legal hold: not applicable    Medication and Other Recommendations: final orders as per Tx Tm  1. No changes    Will continue to follow with you.     Discharge recommendations: as per tx tm           "

## 2022-03-05 NOTE — CARE PLAN
The patient is Stable - Low risk of patient condition declining or worsening    Shift Goals  Clinical Goals: neuro improving, discharge  Patient Goals: comfort  Family Goals: kristen    Progress made toward(s) clinical / shift goals: pending discharge d/t neuro defficit    Patient is not progressing towards the following goals:

## 2022-03-06 LAB
GLUCOSE BLD STRIP.AUTO-MCNC: 133 MG/DL (ref 65–99)
GLUCOSE BLD STRIP.AUTO-MCNC: 153 MG/DL (ref 65–99)
GLUCOSE BLD STRIP.AUTO-MCNC: 187 MG/DL (ref 65–99)
GLUCOSE BLD STRIP.AUTO-MCNC: 269 MG/DL (ref 65–99)
GLUCOSE BLD STRIP.AUTO-MCNC: 283 MG/DL (ref 65–99)

## 2022-03-06 PROCEDURE — 82962 GLUCOSE BLOOD TEST: CPT | Mod: 91

## 2022-03-06 PROCEDURE — A9270 NON-COVERED ITEM OR SERVICE: HCPCS | Performed by: INTERNAL MEDICINE

## 2022-03-06 PROCEDURE — A9270 NON-COVERED ITEM OR SERVICE: HCPCS | Performed by: STUDENT IN AN ORGANIZED HEALTH CARE EDUCATION/TRAINING PROGRAM

## 2022-03-06 PROCEDURE — G0378 HOSPITAL OBSERVATION PER HR: HCPCS

## 2022-03-06 PROCEDURE — 700102 HCHG RX REV CODE 250 W/ 637 OVERRIDE(OP): Performed by: INTERNAL MEDICINE

## 2022-03-06 PROCEDURE — 700102 HCHG RX REV CODE 250 W/ 637 OVERRIDE(OP): Performed by: STUDENT IN AN ORGANIZED HEALTH CARE EDUCATION/TRAINING PROGRAM

## 2022-03-06 PROCEDURE — 99224 PR SUBSEQUENT OBSERVATION CARE,LEVEL I: CPT | Performed by: STUDENT IN AN ORGANIZED HEALTH CARE EDUCATION/TRAINING PROGRAM

## 2022-03-06 PROCEDURE — 96372 THER/PROPH/DIAG INJ SC/IM: CPT | Mod: XU

## 2022-03-06 RX ADMIN — MONTELUKAST 10 MG: 10 TABLET, FILM COATED ORAL at 18:35

## 2022-03-06 RX ADMIN — LISINOPRIL 10 MG: 10 TABLET ORAL at 04:43

## 2022-03-06 RX ADMIN — BUDESONIDE AND FORMOTEROL FUMARATE DIHYDRATE 2 PUFF: 160; 4.5 AEROSOL RESPIRATORY (INHALATION) at 04:38

## 2022-03-06 RX ADMIN — INSULIN HUMAN 2 UNITS: 100 INJECTION, SOLUTION PARENTERAL at 12:58

## 2022-03-06 RX ADMIN — INSULIN GLARGINE 30 UNITS: 100 INJECTION, SOLUTION SUBCUTANEOUS at 04:35

## 2022-03-06 RX ADMIN — LURASIDONE HYDROCHLORIDE 20 MG: 20 TABLET, FILM COATED ORAL at 18:36

## 2022-03-06 RX ADMIN — ATORVASTATIN CALCIUM 20 MG: 20 TABLET, FILM COATED ORAL at 18:35

## 2022-03-06 RX ADMIN — DIVALPROEX SODIUM 1500 MG: 500 TABLET, DELAYED RELEASE ORAL at 20:34

## 2022-03-06 RX ADMIN — BUDESONIDE AND FORMOTEROL FUMARATE DIHYDRATE 2 PUFF: 160; 4.5 AEROSOL RESPIRATORY (INHALATION) at 18:35

## 2022-03-06 RX ADMIN — LEVOTHYROXINE SODIUM 225 MCG: 0.07 TABLET ORAL at 04:38

## 2022-03-06 RX ADMIN — SERTRALINE 150 MG: 50 TABLET, FILM COATED ORAL at 20:34

## 2022-03-06 RX ADMIN — ACETAMINOPHEN 1000 MG: 500 TABLET ORAL at 08:24

## 2022-03-06 RX ADMIN — INSULIN HUMAN 5 UNITS: 100 INJECTION, SOLUTION PARENTERAL at 20:37

## 2022-03-06 RX ADMIN — INSULIN HUMAN 5 UNITS: 100 INJECTION, SOLUTION PARENTERAL at 18:41

## 2022-03-06 RX ADMIN — PRAZOSIN HYDROCHLORIDE 4 MG: 2 CAPSULE ORAL at 18:35

## 2022-03-06 RX ADMIN — INSULIN GLARGINE 30 UNITS: 100 INJECTION, SOLUTION SUBCUTANEOUS at 18:41

## 2022-03-06 RX ADMIN — RIVAROXABAN 10 MG: 10 TABLET, FILM COATED ORAL at 08:18

## 2022-03-06 ASSESSMENT — PAIN DESCRIPTION - PAIN TYPE
TYPE: ACUTE PAIN

## 2022-03-06 ASSESSMENT — ENCOUNTER SYMPTOMS
FEVER: 0
SORE THROAT: 1
SHORTNESS OF BREATH: 0
VOMITING: 0
SEIZURES: 0
FALLS: 0
SENSORY CHANGE: 0
NAUSEA: 0
FOCAL WEAKNESS: 1
ABDOMINAL PAIN: 0
BLURRED VISION: 1
SPEECH CHANGE: 1
CHILLS: 0
COUGH: 0

## 2022-03-06 NOTE — CARE PLAN
The patient is Stable - Low risk of patient condition declining or worsening    Shift Goals  Clinical Goals: BGL Monitoring  Patient Goals: Sleep  Family Goals: JUANCARLOS    Progress made toward(s) clinical / shift goals:    Problem: Skin Integrity  Goal: Skin integrity is maintained or improved  Outcome: Progressing     Problem: Pain - Standard  Goal: Alleviation of pain or a reduction in pain to the patient’s comfort goal  Outcome: Progressing     Problem: Neuro Status  Goal: Neuro status will remain stable or improve  Outcome: Progressing       Patient is not progressing towards the following goals:

## 2022-03-06 NOTE — PROGRESS NOTES
Assumed care of pt at 1900. Pt alert and oriented.  Denies pain or discomfort.  Blood sugars improved today.  Bed low & locked, alarm on.  Reviewed plan for evening.  Hourly rounding continues.

## 2022-03-06 NOTE — CARE PLAN
The patient is Stable - Low risk of patient condition declining or worsening    Shift Goals  Clinical Goals: Maintain skin integrity   Patient Goals: Sleep  Family Goals: JUANCARLOS    Progress made toward(s) clinical / shift goals: Pt turned every two hours, incontinence maintained during hourly rounding and as needed. Heel mepilex's applied, pt encouraged to do ROM while in bed.     Patient is not progressing towards the following goals: N/A

## 2022-03-06 NOTE — PROGRESS NOTES
[facility] Medicine Daily Progress Note    Date of Service  3/6/2022    Chief Complaint  Norm Prabhakar is a 41 y.o. male admitted 2/26/2022 with right arm weakness and stuttering.    Hospital Course  No notes on file    Interval Problem Update  No acute events overnight.  Patient on do not admit list by all SNF's.  Not a rehab candidate.  Continue rehabbing in house.  Group home placement pending.  Patient is medically cleared for discharge to group home.    I have personally seen and examined the patient at bedside. I discussed the plan of care with patient.    Consultants/Specialty  neurology    Code Status  Full Code    Disposition  Patient is medically cleared for discharge.   Anticipate discharge to group home.  I have placed the appropriate orders for post-discharge needs.    Review of Systems  Review of Systems   Constitutional: Negative for chills and fever.   HENT: Positive for sore throat.    Eyes: Positive for blurred vision (vision loss of R eye chronically).   Respiratory: Negative for cough and shortness of breath.    Cardiovascular: Negative for chest pain and leg swelling.   Gastrointestinal: Negative for abdominal pain, nausea and vomiting.   Genitourinary: Negative for hematuria.   Musculoskeletal: Negative for falls.   Skin: Negative for rash.   Neurological: Positive for speech change and focal weakness. Negative for sensory change and seizures.        Physical Exam  Temp:  [36.2 °C (97.1 °F)-36.6 °C (97.8 °F)] 36.2 °C (97.1 °F)  Pulse:  [59-73] 59  Resp:  [15-18] 15  BP: (105-119)/(65-77) 105/68  SpO2:  [94 %-97 %] 97 %    Physical Exam  Vitals and nursing note reviewed.   Constitutional:       General: He is not in acute distress.     Appearance: He is obese. He is not ill-appearing, toxic-appearing or diaphoretic.   HENT:      Head: Normocephalic and atraumatic.      Right Ear: External ear normal.      Left Ear: External ear normal.      Nose: Nose normal.      Mouth/Throat:      Mouth:  Mucous membranes are moist.      Pharynx: Oropharynx is clear.   Eyes:      General: No scleral icterus.     Extraocular Movements: Extraocular movements intact.   Neck:      Thyroid: Thyroid mass present. No thyroid tenderness.   Cardiovascular:      Rate and Rhythm: Normal rate and regular rhythm.      Heart sounds: No murmur heard.  Pulmonary:      Effort: Pulmonary effort is normal.      Breath sounds: Normal breath sounds.   Abdominal:      General: Bowel sounds are normal.      Palpations: Abdomen is soft.      Tenderness: There is no abdominal tenderness. There is no guarding or rebound.   Musculoskeletal:         General: No swelling or deformity.   Skin:     Coloration: Skin is not jaundiced.      Findings: No bruising.   Neurological:      Mental Status: He is alert and oriented to person, place, and time.      Cranial Nerves: No cranial nerve deficit.      Sensory: No sensory deficit.      Motor: Weakness present.      Comments: RUE 1/5 strength, RLE 3/5 strength   Psychiatric:         Mood and Affect: Mood normal.         Behavior: Behavior normal.         Fluids    Intake/Output Summary (Last 24 hours) at 3/6/2022 1057  Last data filed at 3/5/2022 2100  Gross per 24 hour   Intake --   Output 1300 ml   Net -1300 ml       Laboratory                        Imaging  DX-CHEST-PORTABLE (1 VIEW)   Final Result      No acute cardiopulmonary abnormality.      CT-CTA NECK WITH & W/O-POST PROCESSING   Final Result      1.  No occlusion or hemodynamically significant stenosis of the neck arteries.   2.  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      CT-CTA HEAD WITH & W/O-POST PROCESS   Final Result      CT angiogram of the [place] within normal limits.      CT-CEREBRAL PERFUSION ANALYSIS   Final Result      1.  Cerebral blood flow less than 30% likely representing completed infarct = 0 mL.      2.  T Max more than 6 seconds likely representing combination of  completed infarct and ischemia = 0 mL.      3.  Mismatched volume likely representing ischemic brain/penumbra = None      4.  Please note that the cerebral perfusion was performed on the limited brain tissue around the basal ganglia region. Infarct/ischemia outside the CT perfusion sections can be missed in this study.      CT-HEAD W/O   Final Result      1.  Confluent cerebral white matter hypodensities again noted which may represent severe chronic leukoencephalopathy..   2.  No acute intracranial abnormality.              Assessment/Plan  * Acute right-sided weakness- (present on admission)  Assessment & Plan  Chronic recurrent issue, per our records he has had 19 MRI head since 2011 with the majority ordered for right-sided weakness  CT scans in the ED negative for acute abnormality, chronic moderate cerebellar loss  Patient says this is similar to his previous episodes and symptoms usually resolve in a few days or less  On exam 0/5 strength, however while talking to the patient his right toe and foot do move  Weakness likely due to conversion disorder  Psychiatry following  Patient refused by all SNFs, not a rehab candidate  Medically cleared for discharge to group home, [facility] working on group home placement    Conversion disorder- (present on admission)  Assessment & Plan  Recurrent right-sided weakness, admitted again for this  Psych consult placed     Primary hypertension- (present on admission)  Assessment & Plan  Continue lisinopril    Mixed hyperlipidemia- (present on admission)  Assessment & Plan  Continue atorvastatin    Class 1 obesity due to excess calories with serious comorbidity and body mass index (BMI) of 32.0 to 32.9 in adult- (present on admission)  Assessment & Plan  BMI 32.7    Type 2 diabetes mellitus without complication, without long-term current use of insulin (HCC)- (present on admission)  Assessment & Plan  Hold home orals.  Continue insulin glargine  Insulin sliding scale hypoglycemia  protocol in the hospital    PTSD (post-traumatic stress disorder)- (present on admission)  Assessment & Plan  Continue sertraline and prazosin    History of seizure- (present on admission)  Assessment & Plan  Continue divalproex    Anxiety and depression- (present on admission)  Assessment & Plan  Continue sertraline    Postoperative hypothyroidism- (present on admission)  Assessment & Plan  Continue levothyroxine  Thyroid mass, h/o goiter  CT neck shows thyroid mass  TSH      Asthma- (present on admission)  Assessment & Plan  Continue montelukast and home inhaler       VTE prophylaxis: enoxaparin ppx    I have performed a physical exam and reviewed and updated ROS and Plan today (3/6/2022). In review of yesterday's note (3/5/2022), there are no changes except as documented above.

## 2022-03-06 NOTE — CARE PLAN
The patient is Stable - Low risk of patient condition declining or worsening    Shift Goals  Clinical Goals: Maintain skin integrity  Patient Goals: Movies  Family Goals: JUANCARLOS    Progress made toward(s) clinical / shift goals: Pt turned every two hours. Incontinence maintained during q2 hour turns and as needed. Heel mepilex's applied and heels floated with pillows.    Patient is not progressing towards the following goals:N/A

## 2022-03-07 LAB
GLUCOSE BLD STRIP.AUTO-MCNC: 148 MG/DL (ref 65–99)
GLUCOSE BLD STRIP.AUTO-MCNC: 152 MG/DL (ref 65–99)
GLUCOSE BLD STRIP.AUTO-MCNC: 167 MG/DL (ref 65–99)
GLUCOSE BLD STRIP.AUTO-MCNC: 169 MG/DL (ref 65–99)
GLUCOSE BLD STRIP.AUTO-MCNC: 271 MG/DL (ref 65–99)

## 2022-03-07 PROCEDURE — A9270 NON-COVERED ITEM OR SERVICE: HCPCS | Performed by: STUDENT IN AN ORGANIZED HEALTH CARE EDUCATION/TRAINING PROGRAM

## 2022-03-07 PROCEDURE — 82962 GLUCOSE BLOOD TEST: CPT | Mod: 91

## 2022-03-07 PROCEDURE — 99244 OFF/OP CNSLTJ NEW/EST MOD 40: CPT | Performed by: PHYSICAL MEDICINE & REHABILITATION

## 2022-03-07 PROCEDURE — 99224 PR SUBSEQUENT OBSERVATION CARE,LEVEL I: CPT | Performed by: STUDENT IN AN ORGANIZED HEALTH CARE EDUCATION/TRAINING PROGRAM

## 2022-03-07 PROCEDURE — G0378 HOSPITAL OBSERVATION PER HR: HCPCS

## 2022-03-07 PROCEDURE — 700102 HCHG RX REV CODE 250 W/ 637 OVERRIDE(OP): Performed by: STUDENT IN AN ORGANIZED HEALTH CARE EDUCATION/TRAINING PROGRAM

## 2022-03-07 PROCEDURE — 96372 THER/PROPH/DIAG INJ SC/IM: CPT | Mod: XU

## 2022-03-07 RX ORDER — CYCLOBENZAPRINE HCL 10 MG
5 TABLET ORAL 3 TIMES DAILY PRN
Status: DISCONTINUED | OUTPATIENT
Start: 2022-03-07 | End: 2022-03-20 | Stop reason: HOSPADM

## 2022-03-07 RX ADMIN — INSULIN GLARGINE 30 UNITS: 100 INJECTION, SOLUTION SUBCUTANEOUS at 16:59

## 2022-03-07 RX ADMIN — LEVOTHYROXINE SODIUM 225 MCG: 0.07 TABLET ORAL at 05:26

## 2022-03-07 RX ADMIN — LISINOPRIL 10 MG: 10 TABLET ORAL at 05:26

## 2022-03-07 RX ADMIN — BUDESONIDE AND FORMOTEROL FUMARATE DIHYDRATE 2 PUFF: 160; 4.5 AEROSOL RESPIRATORY (INHALATION) at 05:26

## 2022-03-07 RX ADMIN — INSULIN HUMAN 5 UNITS: 100 INJECTION, SOLUTION PARENTERAL at 20:30

## 2022-03-07 RX ADMIN — SERTRALINE 150 MG: 50 TABLET, FILM COATED ORAL at 20:28

## 2022-03-07 RX ADMIN — LURASIDONE HYDROCHLORIDE 20 MG: 20 TABLET, FILM COATED ORAL at 16:58

## 2022-03-07 RX ADMIN — ATORVASTATIN CALCIUM 20 MG: 20 TABLET, FILM COATED ORAL at 16:59

## 2022-03-07 RX ADMIN — INSULIN GLARGINE 30 UNITS: 100 INJECTION, SOLUTION SUBCUTANEOUS at 05:26

## 2022-03-07 RX ADMIN — RIVAROXABAN 10 MG: 10 TABLET, FILM COATED ORAL at 08:43

## 2022-03-07 RX ADMIN — MONTELUKAST 10 MG: 10 TABLET, FILM COATED ORAL at 16:58

## 2022-03-07 RX ADMIN — INSULIN HUMAN 2 UNITS: 100 INJECTION, SOLUTION PARENTERAL at 12:44

## 2022-03-07 RX ADMIN — INSULIN HUMAN 2 UNITS: 100 INJECTION, SOLUTION PARENTERAL at 08:09

## 2022-03-07 RX ADMIN — BUDESONIDE AND FORMOTEROL FUMARATE DIHYDRATE 2 PUFF: 160; 4.5 AEROSOL RESPIRATORY (INHALATION) at 16:58

## 2022-03-07 RX ADMIN — DIVALPROEX SODIUM 1500 MG: 500 TABLET, DELAYED RELEASE ORAL at 20:28

## 2022-03-07 RX ADMIN — PRAZOSIN HYDROCHLORIDE 4 MG: 2 CAPSULE ORAL at 16:59

## 2022-03-07 ASSESSMENT — ENCOUNTER SYMPTOMS
FOCAL WEAKNESS: 1
SHORTNESS OF BREATH: 0
COUGH: 0
FALLS: 0
ABDOMINAL PAIN: 0
SPEECH CHANGE: 1
CHILLS: 0
NAUSEA: 0
VOMITING: 0
SORE THROAT: 1
BLURRED VISION: 1
SEIZURES: 0
FEVER: 0
SENSORY CHANGE: 0

## 2022-03-07 NOTE — CARE PLAN
The patient is Stable - Low risk of patient condition declining or worsening    Shift Goals  Clinical Goals: Maintain skin integrity  Patient Goals: Sleep  Family Goals: JUANCARLOS    Progress made toward(s) clinical / shift goals: Encouraging Q2hr turns, patient is refusing assistance with Q2hr turns for night shift but states he will keep turning in bed, education provided. Insulin Regular coverage given for bedtime glucose accu-check. Bed is low and locked, bed alarm is on, call light is within reach, hourly rounding continues.      Problem: Skin Integrity  Goal: Skin integrity is maintained or improved  Outcome: Progressing  Note: Encourage Q2hr turns, waffle overlay, heel mepilex and heel float boots.     Problem: Neuro Status  Goal: Neuro status will remain stable or improve  Outcome: Progressing     Patient is not progressing towards the following goals:N/A

## 2022-03-07 NOTE — CONSULTS
"  [facility] PSYCHIATRIC FOLLOW-UP:(established)  *Reason for admission:  LKW 1900. Patient states sudden onset right sided weakness. Patient has no effort against gravity for R arm or R leg. Facial droop noted. Patient has stuttered speech and endorses HA   *Legal Hold Status: not applicable                S:  Able to move his R side more. Still has stuttering like speech. Made a humorous comment that was appropriate.    O: Medical ROS (as pertinent):                      *Psychiatric Examination:   Vitals:   Vitals:    03/06/22 2000 03/07/22 0400 03/07/22 0526 03/07/22 0709   BP: 123/64 121/78 124/76 122/75   Pulse: 74 71  69   Resp: 18 18  18   Temp: 36.3 °C (97.4 °F) 36.7 °C (98.1 °F)  36.3 °C (97.3 °F)   TempSrc: Temporal Temporal  Temporal   SpO2: 93% 98%  93%   Weight:       Height:               Current Medications:  Scheduled Medications   Medication Dose Frequency   • rivaroxaban  10 mg QDAY with Breakfast   • Pharmacy Consult Request  1 Each PHARMACY TO DOSE   • senna-docusate  2 Tablet BID   • atorvastatin  20 mg Q EVENING   • budesonide-formoterol  2 Puff BID   • divalproex  1,500 mg QHS   • insulin GLARGINE  30 Units BID   • levothyroxine  225 mcg AM ES   • lisinopril  10 mg DAILY   • lurasidone  20 mg Q EVENING   • montelukast  10 mg Q EVENING   • prazosin  4 mg Q EVENING   • sertraline  150 mg DAILY   • insulin regular  2-9 Units 4X/DAY ACHS          *ASSESSMENT/RECOMENDATIONS:  1.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 Mood disorder unspc: hx of. Stable  - to include \"lonliness and boredom\"        3. R/O intellectual limitations  -  learning disability, special education  -speech cog: 3/11: moderate problems in attn. Otherwise unremarkable but \"..Pt has assistance w/ medication management and other IADLs in his group home. No further acute SLP needs are indicated at this time, though pt will benefit from either home health or outpatient SLP tx to " "address his exacerbated fluency disorder.\"  -limited reliability     4. PTSD: hx of: stable     5.  Medical  -as per 2/3/2021:  \"has been seen multiple times by psychiatry. He has a documented SZ disorder, likely intellectual disability and features of a conversion disorder.\"  -HTN  -DM II   -Hx of SZ disorder:depakote     -hypothyroidism  -R tingling and numbness of LE..     Medical:   -as per 2/3/2021:  \"has been seen multiple times by psychiatry. He has a documented SZ disorder, likely intellectual disability and features of a conversion disorder.\"  -HTN  -DM II   -Hx of SZ disorder:depakote     -hypothyroidism  -R tingling and numbness of LE.     Legal hold: not applicable     Medication and Other Recommendations: final orders as per Tx Tm  1. No changes     Will continue to follow with you.     Discharge recommendations: as per tx tm        "

## 2022-03-07 NOTE — PROGRESS NOTES
[facility] Medicine Daily Progress Note    Date of Service  3/7/2022    Chief Complaint  Norm Prabhakar is a 41 y.o. male admitted 2/26/2022 with right arm weakness and stuttering.    Hospital Course  No notes on file    Interval Problem Update  No acute events overnight.  Patient on do not admit list by all SNF's.  Not a rehab candidate.  Continue rehabbing in house, strength improving.  Group home placement pending.  Patient is medically cleared for discharge to group home.    I have personally seen and examined the patient at bedside. I discussed the plan of care with patient.    Consultants/Specialty  neurology    Code Status  Full Code    Disposition  Patient is medically cleared for discharge.   Anticipate discharge to group home.  I have placed the appropriate orders for post-discharge needs.    Review of Systems  Review of Systems   Constitutional: Negative for chills and fever.   HENT: Positive for sore throat.    Eyes: Positive for blurred vision (vision loss of R eye chronically).   Respiratory: Negative for cough and shortness of breath.    Cardiovascular: Negative for chest pain and leg swelling.   Gastrointestinal: Negative for abdominal pain, nausea and vomiting.   Genitourinary: Negative for hematuria.   Musculoskeletal: Negative for falls.   Skin: Negative for rash.   Neurological: Positive for speech change and focal weakness. Negative for sensory change and seizures.        Physical Exam  Temp:  [36.3 °C (97.3 °F)-36.7 °C (98.1 °F)] 36.3 °C (97.3 °F)  Pulse:  [69-74] 69  Resp:  [16-18] 18  BP: (118-124)/(64-78) 122/75  SpO2:  [92 %-98 %] 93 %    Physical Exam  Vitals and nursing note reviewed.   Constitutional:       General: He is not in acute distress.     Appearance: He is obese. He is not ill-appearing, toxic-appearing or diaphoretic.   HENT:      Head: Normocephalic and atraumatic.      Right Ear: External ear normal.      Left Ear: External ear normal.      Nose: Nose normal.       Mouth/Throat:      Mouth: Mucous membranes are moist.      Pharynx: Oropharynx is clear.   Eyes:      General: No scleral icterus.     Extraocular Movements: Extraocular movements intact.   Neck:      Thyroid: Thyroid mass present. No thyroid tenderness.   Cardiovascular:      Rate and Rhythm: Normal rate and regular rhythm.      Heart sounds: No murmur heard.  Pulmonary:      Effort: Pulmonary effort is normal.      Breath sounds: Normal breath sounds.   Abdominal:      General: Bowel sounds are normal.      Palpations: Abdomen is soft.      Tenderness: There is no abdominal tenderness. There is no guarding or rebound.   Musculoskeletal:         General: No swelling or deformity.   Skin:     Coloration: Skin is not jaundiced.      Findings: No bruising.   Neurological:      Mental Status: He is alert and oriented to person, place, and time.      Cranial Nerves: No cranial nerve deficit.      Sensory: No sensory deficit.      Motor: Weakness present.      Comments: RUE 3/5 strength, RLE 3/5 strength   Psychiatric:         Mood and Affect: Mood normal.         Behavior: Behavior normal.         Fluids    Intake/Output Summary (Last 24 hours) at 3/7/2022 1240  Last data filed at 3/7/2022 0709  Gross per 24 hour   Intake 240 ml   Output 2650 ml   Net -2410 ml       Laboratory                        Imaging  DX-CHEST-PORTABLE (1 VIEW)   Final Result      No acute cardiopulmonary abnormality.      CT-CTA NECK WITH & W/O-POST PROCESSING   Final Result      1.  No occlusion or hemodynamically significant stenosis of the neck arteries.   2.  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      CT-CTA HEAD WITH & W/O-POST PROCESS   Final Result      CT angiogram of the [place] within normal limits.      CT-CEREBRAL PERFUSION ANALYSIS   Final Result      1.  Cerebral blood flow less than 30% likely representing completed infarct = 0 mL.      2.  T Max more than 6 seconds likely  representing combination of completed infarct and ischemia = 0 mL.      3.  Mismatched volume likely representing ischemic brain/penumbra = None      4.  Please note that the cerebral perfusion was performed on the limited brain tissue around the basal ganglia region. Infarct/ischemia outside the CT perfusion sections can be missed in this study.      CT-HEAD W/O   Final Result      1.  Confluent cerebral white matter hypodensities again noted which may represent severe chronic leukoencephalopathy..   2.  No acute intracranial abnormality.              Assessment/Plan  * Acute right-sided weakness- (present on admission)  Assessment & Plan  Chronic recurrent issue, per our records he has had 19 MRI head since 2011 with the majority ordered for right-sided weakness  CT scans in the ED negative for acute abnormality, chronic moderate cerebellar loss  Patient says this is similar to his previous episodes and symptoms usually resolve in a few days or less  On exam 0/5 strength, however while talking to the patient his right toe and foot do move  Weakness likely due to conversion disorder  Psychiatry following  Patient refused by all SNFs, not a rehab candidate  Medically cleared for discharge to group home, SW working on group home placement    Conversion disorder- (present on admission)  Assessment & Plan  Recurrent right-sided weakness, admitted again for this  Psych consult placed     Primary hypertension- (present on admission)  Assessment & Plan  Continue lisinopril    Mixed hyperlipidemia- (present on admission)  Assessment & Plan  Continue atorvastatin    Class 1 obesity due to excess calories with serious comorbidity and body mass index (BMI) of 32.0 to 32.9 in adult- (present on admission)  Assessment & Plan  BMI 32.7    Type 2 diabetes mellitus without complication, without long-term current use of insulin (HCC)- (present on admission)  Assessment & Plan  Hold home orals.  Continue insulin glargine  Insulin  sliding scale hypoglycemia protocol in the hospital    PTSD (post-traumatic stress disorder)- (present on admission)  Assessment & Plan  Continue sertraline and prazosin    History of seizure- (present on admission)  Assessment & Plan  Continue divalproex    Anxiety and depression- (present on admission)  Assessment & Plan  Continue sertraline    Postoperative hypothyroidism- (present on admission)  Assessment & Plan  Continue levothyroxine  Thyroid mass, h/o goiter  CT neck shows thyroid mass  TSH      Asthma- (present on admission)  Assessment & Plan  Continue montelukast and home inhaler       VTE prophylaxis: enoxaparin ppx    I have performed a physical exam and reviewed and updated ROS and Plan today (3/7/2022). In review of yesterday's note (3/6/2022), there are no changes except as documented above.

## 2022-03-07 NOTE — DISCHARGE PLANNING
Anticipated Discharge Disposition: group home    Action: CM left a VMM with the patients  Philip from [facility] [phone] requesting a call back to discuss the process with transferring the patient to another group home that can accommodate his needs.      Barriers to Discharge: placement    Plan: CM will continue to follow for placement into another group home or the patient will have to remain in house until he is able to ambulate independently.

## 2022-03-07 NOTE — CONSULTS
Physical Medicine and Rehabilitation Consultation              Date of initial consultation: 3/7/2022  Consulting provider: Jordan Corona MD  Reason for consultation: assess for acute inpatient rehab appropriateness  LOS: 0 Day(s)    Chief complaint: Right arm weakness and stuttering    HPI: The patient is a 41 y.o.male with a past medical history of anxiety, bipolar disorder, depression, diabetes, glaucoma, hypothyroidism, indigestion, heart murmur, pneumonia, PTSD, seizures;  who presented on 2/26/2022  7:49 PM with right arm weakness and difficulty speaking.  Patient also endorsed numbness on his entire left side.  Patient was seen by neurology for stroke work-up, found to have a NIH score of 9.  Patient was evaluated with CT perfusion, found to have no mismatch, CT angiogram found no LVO, and CT head found no acute findings patient has been seen by psych multiple times on this admission who reports this patient likely has intellectual disability and features of conversion disorder.     The patient currently reports back spasms, right-sided weakness, recently had a bowel movement in bed, right shoulder pain from fall on ice on [place].  Patient reports he was staying with [facility] adult mental services, however was recently discharged and now he does not know where he is going to live.  Patient reports he has a mother in [place], but does not want to return to [facility] due to issues he has had in the past with long enforcement there.  Patient did not elaborate.    ROS  Pertinent positives are mentioned in the HPI, all others reviewed and are negative.    Social Hx:  Patient lives at [facility] and receives assistance for all housing, transportation, food, appointment scheduling.  Multiple discharge notes indicate that patient is not welcome back, and is on do not admit list by all SNF's    THERAPY:  Restrictions: Fall risk  PT: Functional mobility   3/4: Unable to participate in gait.   Contact-guard assist for sit to stand and transfers    OT: ADLs  3/4: Min assist dressing    SLP:   None    IMAGING:  CT head 2/26/2022  1.  Confluent cerebral white matter hypodensities again noted which may represent severe chronic leukoencephalopathy..  2.  No acute intracranial abnormality.    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   • Hypothyroidism    • Indigestion     once in a while   • Mental disorder     learning disabilities; speech impairment; developmental delays   • Murmur     since birth   • Pneumonia     remote   • Psychiatric problem 2002    PTSD   • S/P thyroidectomy    • Seizure ([facility]) 2010   • Seizure disorder ([facility])    • Unspecified disorder of thyroid        PSH:  Past Surgical History:   Procedure Laterality Date   • EYE SURGERY     • OTHER      Hernia Repair when he was 10 yrs old   • THYROID LOBECTOMY         FHX:  Family History   Problem Relation Age of Onset   • Hypertension Mother    • Heart Disease Mother    • Lung Disease Mother    • Stroke Maternal Grandmother        Medications:  Current Facility-Administered Medications   Medication Dose   • rivaroxaban (XARELTO) tablet 10 mg  10 mg   • ibuprofen (MOTRIN) tablet 600 mg  600 mg   • Pharmacy Consult Request ...Pain Management Review 1 Each  1 Each   • acetaminophen (TYLENOL) tablet 1,000 mg  1,000 mg   • senna-docusate (PERICOLACE or SENOKOT S) 8.6-50 MG per tablet 2 Tablet  2 Tablet    And   • polyethylene glycol/lytes (MIRALAX) PACKET 1 Packet  1 Packet    And   • bisacodyl (DULCOLAX) suppository 10 mg  10 mg   • labetalol (NORMODYNE/TRANDATE) injection 10 mg  10 mg   • atorvastatin (LIPITOR) tablet 20 mg  20 mg   • budesonide-formoterol (SYMBICORT) 160-4.5 MCG/ACT inhaler 2 Puff  2 Puff   •  "divalproex (DEPAKOTE) delayed-release tablet 1,500 mg  1,500 mg   • insulin GLARGINE (Lantus,Semglee) injection  30 Units   • levothyroxine (SYNTHROID) tablet 225 mcg  225 mcg   • lisinopril (PRINIVIL) tablet 10 mg  10 mg   • lurasidone (LATUDA) tablet 20 mg  20 mg   • montelukast (SINGULAIR) tablet 10 mg  10 mg   • prazosin (MINIPRESS) capsule 4 mg  4 mg   • sertraline (Zoloft) tablet 150 mg  150 mg   • insulin regular (HumuLIN R,NovoLIN R) injection  2-9 Units    And   • dextrose 50% (D50W) injection 50 mL  50 mL       Allergies:  Allergies   Allergen Reactions   • Abilify      \"Feeling tired, like I don't even know whats going on around me\"   • Fish      Pt reports fish causes him to be sick to his stomach  Not listed on MAR noted 2/3/2021     • Geodon [Ziprasidone Hcl] Anaphylaxis     Anaphylaxis per patient   • Aripiprazole      \"I became lethargic.\"   • Ziprasidone          Physical Exam:  Vitals: /75   Pulse 69   Temp 36.3 °C (97.3 °F) (Temporal)   Resp 18   Ht 1.981 m (6' 6\")   Wt (!) 128 kg (283 lb)   SpO2 93%   Gen: NAD  Head: NC/AT  Eyes/ Nose/ Mouth: PERRLA, moist mucous membranes  Cardio: RRR, good distal perfusion, warm extremities  Pulm: normal respiratory effort, no cyanosis   Abd: Soft NTND, negative borborygmi   Ext: No peripheral edema. No calf tenderness. No clubbing.    Mental status: answers questions appropriately follows commands  Speech: fluent, no aphasia or dysarthria      Motor:      Upper Extremity  Myotome R L   Shoulder flexion C5 3/5 5   Elbow flexion C5 3/5 5   Wrist extension C6 3/5 5   Elbow extension C7 3/5 5   Finger flexion C8 3/5 5   Finger abduction T1 3/5 5     Lower Extremity Myotome R L   Hip flexion L2 1/5 5   Knee extension L3 1/5 5   Ankle dorsiflexion L4 1/5 5   Toe extension L5 1/5 5   Ankle plantarflexion S1 1/5 5     Labs: Reviewed and significant for   No results for input(s): RBC, HEMOGLOBIN, HEMATOCRIT, PLATELETCT, PROTHROMBTM, APTT, INR, IRON, " FERRITIN, TOTIRONBC in the last 72 hours.      Recent Results (from the past 24 hour(s))   POCT glucose device results    Collection Time: 03/06/22 12:55 PM   Result Value Ref Range    POC Glucose, Blood 187 (H) 65 - 99 mg/dL   POCT glucose device results    Collection Time: 03/06/22  6:27 PM   Result Value Ref Range    POC Glucose, Blood 269 (H) 65 - 99 mg/dL   POCT glucose device results    Collection Time: 03/06/22  8:34 PM   Result Value Ref Range    POC Glucose, Blood 283 (H) 65 - 99 mg/dL   POCT glucose device results    Collection Time: 03/07/22  5:25 AM   Result Value Ref Range    POC Glucose, Blood 169 (H) 65 - 99 mg/dL   POCT glucose device results    Collection Time: 03/07/22  7:50 AM   Result Value Ref Range    POC Glucose, Blood 152 (H) 65 - 99 mg/dL         ASSESSMENT:  Patient is a 41 y.o. male admitted with right-sided weakness, suspected to have conversion disorder     Rehabilitation: Impaired ADLs and mobility  Patient is not a candidate for IPR due to lack of stable discharge plan    Additional Recommendations:  -Difficult discharge.  Patient is not a candidate for IPR due to lack of stable discharge plan.  Various notes in the discharge tab state that he has been black listed by all SNF's.  Patient does not want to return home with mother.  -Continue PT OT and psych while in-house  -Conversion disorder does improve with therapy, and patient should work on goal directed exercises with an emphasis on self-care and transfers  -Appreciate psych following  -All imaging reviewed, no acute findings on CT head, no osseous abnormalities seen on shoulder x-ray from 2/24  -Continue current care per primary team  -PMR will sign off, please reconsult or reach out via Voalte if further evaluation or medical management is requested      Thank you for allowing us to participate in the care of this patient.     Patient was seen for 82 minutes on unit/floor of which > 50% of time was spent on counseling and  coordination of care regarding the above, including prognosis, risk reduction, benefits of treatment, and options for next stage of care.    Nick Bush, DO   Physical Medicine and Rehabilitation     Please note that this dictation was created using voice recognition software. I have made every reasonable attempt to correct obvious errors, but there may be errors of grammar and possibly content that I did not discover before finalizing the note.

## 2022-03-08 LAB
GLUCOSE BLD STRIP.AUTO-MCNC: 141 MG/DL (ref 65–99)
GLUCOSE BLD STRIP.AUTO-MCNC: 152 MG/DL (ref 65–99)
GLUCOSE BLD STRIP.AUTO-MCNC: 207 MG/DL (ref 65–99)
GLUCOSE BLD STRIP.AUTO-MCNC: 214 MG/DL (ref 65–99)
GLUCOSE BLD STRIP.AUTO-MCNC: 215 MG/DL (ref 65–99)

## 2022-03-08 PROCEDURE — 99224 PR SUBSEQUENT OBSERVATION CARE,LEVEL I: CPT | Performed by: STUDENT IN AN ORGANIZED HEALTH CARE EDUCATION/TRAINING PROGRAM

## 2022-03-08 PROCEDURE — A9270 NON-COVERED ITEM OR SERVICE: HCPCS | Performed by: STUDENT IN AN ORGANIZED HEALTH CARE EDUCATION/TRAINING PROGRAM

## 2022-03-08 PROCEDURE — 700102 HCHG RX REV CODE 250 W/ 637 OVERRIDE(OP): Performed by: STUDENT IN AN ORGANIZED HEALTH CARE EDUCATION/TRAINING PROGRAM

## 2022-03-08 PROCEDURE — 97116 GAIT TRAINING THERAPY: CPT

## 2022-03-08 PROCEDURE — 97530 THERAPEUTIC ACTIVITIES: CPT

## 2022-03-08 PROCEDURE — 96372 THER/PROPH/DIAG INJ SC/IM: CPT | Mod: XU

## 2022-03-08 PROCEDURE — 82962 GLUCOSE BLOOD TEST: CPT | Mod: 91

## 2022-03-08 PROCEDURE — G0378 HOSPITAL OBSERVATION PER HR: HCPCS

## 2022-03-08 PROCEDURE — 97535 SELF CARE MNGMENT TRAINING: CPT

## 2022-03-08 RX ORDER — LATANOPROST 50 UG/ML
1 SOLUTION/ DROPS OPHTHALMIC EVERY EVENING
Status: DISCONTINUED | OUTPATIENT
Start: 2022-03-08 | End: 2022-03-20 | Stop reason: HOSPADM

## 2022-03-08 RX ADMIN — BUDESONIDE AND FORMOTEROL FUMARATE DIHYDRATE 2 PUFF: 160; 4.5 AEROSOL RESPIRATORY (INHALATION) at 04:43

## 2022-03-08 RX ADMIN — INSULIN HUMAN 3 UNITS: 100 INJECTION, SOLUTION PARENTERAL at 17:49

## 2022-03-08 RX ADMIN — LEVOTHYROXINE SODIUM 225 MCG: 0.07 TABLET ORAL at 04:43

## 2022-03-08 RX ADMIN — BUDESONIDE AND FORMOTEROL FUMARATE DIHYDRATE 2 PUFF: 160; 4.5 AEROSOL RESPIRATORY (INHALATION) at 17:49

## 2022-03-08 RX ADMIN — SERTRALINE 150 MG: 50 TABLET, FILM COATED ORAL at 21:44

## 2022-03-08 RX ADMIN — DIVALPROEX SODIUM 1500 MG: 500 TABLET, DELAYED RELEASE ORAL at 21:44

## 2022-03-08 RX ADMIN — PRAZOSIN HYDROCHLORIDE 4 MG: 2 CAPSULE ORAL at 17:49

## 2022-03-08 RX ADMIN — INSULIN HUMAN 3 UNITS: 100 INJECTION, SOLUTION PARENTERAL at 13:06

## 2022-03-08 RX ADMIN — INSULIN GLARGINE 30 UNITS: 100 INJECTION, SOLUTION SUBCUTANEOUS at 17:49

## 2022-03-08 RX ADMIN — RIVAROXABAN 10 MG: 10 TABLET, FILM COATED ORAL at 08:17

## 2022-03-08 RX ADMIN — LISINOPRIL 10 MG: 10 TABLET ORAL at 04:43

## 2022-03-08 RX ADMIN — INSULIN HUMAN 3 UNITS: 100 INJECTION, SOLUTION PARENTERAL at 21:45

## 2022-03-08 RX ADMIN — ATORVASTATIN CALCIUM 20 MG: 20 TABLET, FILM COATED ORAL at 17:49

## 2022-03-08 RX ADMIN — SENNOSIDES AND DOCUSATE SODIUM 2 TABLET: 50; 8.6 TABLET ORAL at 17:49

## 2022-03-08 RX ADMIN — INSULIN GLARGINE 30 UNITS: 100 INJECTION, SOLUTION SUBCUTANEOUS at 04:43

## 2022-03-08 RX ADMIN — INSULIN HUMAN 2 UNITS: 100 INJECTION, SOLUTION PARENTERAL at 08:18

## 2022-03-08 RX ADMIN — LURASIDONE HYDROCHLORIDE 20 MG: 20 TABLET, FILM COATED ORAL at 17:48

## 2022-03-08 RX ADMIN — MONTELUKAST 10 MG: 10 TABLET, FILM COATED ORAL at 17:49

## 2022-03-08 ASSESSMENT — COGNITIVE AND FUNCTIONAL STATUS - GENERAL
DAILY ACTIVITIY SCORE: 19
SUGGESTED CMS G CODE MODIFIER MOBILITY: CL
DRESSING REGULAR LOWER BODY CLOTHING: A LITTLE
STANDING UP FROM CHAIR USING ARMS: A LITTLE
CLIMB 3 TO 5 STEPS WITH RAILING: TOTAL
PERSONAL GROOMING: A LITTLE
DRESSING REGULAR UPPER BODY CLOTHING: A LITTLE
MOBILITY SCORE: 10
TURNING FROM BACK TO SIDE WHILE IN FLAT BAD: UNABLE
TOILETING: A LITTLE
MOVING TO AND FROM BED TO CHAIR: UNABLE
WALKING IN HOSPITAL ROOM: A LITTLE
MOVING FROM LYING ON BACK TO SITTING ON SIDE OF FLAT BED: UNABLE
HELP NEEDED FOR BATHING: A LITTLE
SUGGESTED CMS G CODE MODIFIER DAILY ACTIVITY: CK

## 2022-03-08 ASSESSMENT — ENCOUNTER SYMPTOMS
FEVER: 0
SHORTNESS OF BREATH: 0
BLURRED VISION: 1
ABDOMINAL PAIN: 0
NAUSEA: 0
COUGH: 0
SPEECH CHANGE: 1
VOMITING: 0
FALLS: 0
SEIZURES: 0
FOCAL WEAKNESS: 1
CHILLS: 0
SORE THROAT: 1
SENSORY CHANGE: 0

## 2022-03-08 ASSESSMENT — GAIT ASSESSMENTS
ASSISTIVE DEVICE: FRONT WHEEL WALKER
GAIT LEVEL OF ASSIST: CONTACT GUARD ASSIST
DISTANCE (FEET): 3

## 2022-03-08 NOTE — THERAPY
Occupational Therapy  Daily Treatment     Patient Name: Norm Prabhakar  Age:  41 y.o., Sex:  male  Medical Record #: [id number]  Today's Date: 3/8/2022     Precautions  Precautions: Fall Risk    Assessment    Pt seen for follow up OT tx session, pt with improved use of RUE able to use it in most functional activities including G/H and able to bring R hand to FWW without assistance of LUE and maintain  throughout mobility with FWW. Will continue to see for skilled therapy and recommend post-acute placement but patient nearing close to baseline.     Plan    Continue current treatment plan.    DC Equipment Recommendations: (P) Unable to determine at this time  Discharge Recommendations: (P) Recommend post-acute placement for additional occupational therapy services prior to discharge home      Objective       03/08/22 1124   Precautions   Precautions Fall Risk   Other Treatments   Other Treatments Provided With more time and encouragement patient able to use RUE with functional activity   Balance   Sitting Balance (Static) Fair +   Sitting Balance (Dynamic) Fair +   Standing Balance (Static) Poor +   Standing Balance (Dynamic) Poor   Weight Shift Sitting Fair   Weight Shift Standing Fair   Skilled Intervention Verbal Cuing   Comments w/ FWW   Bed Mobility    Supine to Sit Contact Guard Assist   Scooting Contact Guard Assist   Skilled Intervention Verbal Cuing   Activities of Daily Living   Eating Supervision   Grooming Supervision;Seated  (using BUEs)   Upper Body Dressing Minimal Assist   Lower Body Dressing Minimal Assist   Skilled Intervention Verbal Cuing   How much help from another person does the patient currently need...   Putting on and taking off regular lower body clothing? 3   Bathing (including washing, rinsing, and drying)? 3   Toileting, which includes using a toilet, bedpan, or urinal? 3   Putting on and taking off regular upper body clothing? 3   Taking care of personal grooming such as brushing  teeth? 3   Eating meals? 4   6 Clicks Daily Activity Score 19   Modified Russell (mRS)   Modified Russell Score 4   Functional Mobility   Sit to Stand Contact Guard Assist   Bed, Chair, Wheelchair Transfer Contact Guard Assist   Toilet Transfers Refused   Transfer Method Stand Step   Mobility bed mobility, transfer to chair   Skilled Intervention Verbal Cuing;Facilitation   Activity Tolerance   Sitting in Chair left seated in chair   Sitting Edge of Bed 10 min   Standing 5 min   Short Term Goals   Short Term Goal # 1 Pt will complete ADL transfers with supervision   Goal Outcome # 1 Progressing as expected   Short Term Goal # 2 Pt will complete LB dressing with supervision   Goal Outcome # 2 Progressing as expected   Short Term Goal # 3 Pt will complete toileting with supervision   Goal Outcome # 3 Goal not met   Education Group   Education Provided Role of Occupational Therapist   Role of Occupational Therapist Patient Response Patient;Acceptance;Explanation;Action Demonstration   Anticipated Discharge Equipment and Recommendations   DC Equipment Recommendations Unable to determine at this time   Discharge Recommendations Recommend post-acute placement for additional occupational therapy services prior to discharge home   Interdisciplinary Plan of Care Collaboration   IDT Collaboration with  Nursing   Patient Position at End of Therapy Seated;Chair Alarm On;Call Light within Reach;Tray Table within Reach;Phone within Reach   Collaboration Comments RN updated

## 2022-03-08 NOTE — THERAPY
"Physical Therapy   Daily Treatment     Patient Name: Norm Prabhakar  Age:  41 y.o., Sex:  male  Medical Record #: [id number]  Today's Date: 3/8/2022     Precautions  Precautions: (P) Fall Risk    Assessment    Pt tolerates treatment session well, no pain reported or observed, no LOB or SOB. pt is agreeable, pleasant and cooperative. Pt demonstrates improving use of R UE and LE during functional transfers and therex, improved weight shifting and mobility of R LE during ambulation; pt is able to take 5 steps from EOB to chair with FWW, declines further ambulation due to weakness/fatigue. Pt will benefit from continued PT services in acute setting, as well as in post acute setting.         Plan    Continue current treatment plan.    DC Equipment Recommendations: (P) Unable to determine at this time  Discharge Recommendations: (P) Recommend post-acute placement for additional physical therapy services prior to discharge home      Subjective    \"My legs feel like they are going to give out.\"     03/08/22 1155   Precautions   Precautions Fall Risk   Pain 0 - 10 Group   Therapist Pain Assessment 0;Post Activity Pain Same as Prior to Activity   Cognition    Cognition / Consciousness X   Level of Consciousness Alert   Ability To Follow Commands 1 Step   Attention Impaired   Sequencing Impaired   Strength Lower Body   Lower Body Strength  X   Comments improving but inconsistent strength of R LE, improved weight bearing and AROM   Sitting Lower Body Exercises   Sitting Lower Body Exercises Yes   Ankle Pumps 1 set of 10   Long Arc Quad 1 set of 10   Standing Lower Body Exercises   Standing Lower Body Exercises Yes   Marching 1 set of 10   Comments pt with improved weight bearing and mobiltiy of R LE   Balance   Sitting Balance (Static) Fair +   Sitting Balance (Dynamic) Fair +   Standing Balance (Static) Fair -   Standing Balance (Dynamic) Fair -   Weight Shift Sitting Fair   Weight Shift Standing Fair   Skilled Intervention " Verbal Cuing   Comments with FWW   Gait Analysis   Gait Level Of Assist Contact Guard Assist   Assistive Device Front Wheel Walker   Distance (Feet) 3   # of Times Distance was Traveled 1   Comments 5 steps from EOB to chair   Bed Mobility    Supine to Sit Contact Guard Assist   Scooting Contact Guard Assist   Functional Mobility   Sit to Stand Contact Guard Assist   Bed, Chair, Wheelchair Transfer Contact Guard Assist   Transfer Method Stand Step   Comments improved independence with transfers   How much difficulty does the patient currently have...   Turning over in bed (including adjusting bedclothes, sheets and blankets)? 1   Sitting down on and standing up from a chair with arms (e.g., wheelchair, bedside commode, etc.) 1   Moving from lying on back to sitting on the side of the bed? 1   How much help from another person does the patient currently need...   Moving to and from a bed to a chair (including a wheelchair)? 3   Need to walk in a hospital room? 3   Climbing 3-5 steps with a railing? 1   6 clicks Mobility Score 10   Activity Tolerance   Sitting in Chair left seated in chair   Sitting Edge of Bed 10 min   Standing 4 min total   Short Term Goals    Short Term Goal # 1 supine to/sit EOB with mod I in 6 visits   Goal Outcome # 1 Progressing as expected   Short Term Goal # 2 sit to stand from bed/chair/toilet with SBA in 6 visits   Goal Outcome # 2 Goal met   Short Term Goal # 3 ambulation with LRD 50 feet in 6 visits   Goal Outcome # 3 Progressing as expected   Education Group   Exercise - Standing Patient Response Patient;Acceptance;Explanation;Verbal Demonstration;Action Demonstration;Reinforcement Needed   Transfer Status Patient Response Patient;Acceptance;Explanation;Verbal Demonstration;Action Demonstration   Anticipated Discharge Equipment and Recommendations   DC Equipment Recommendations Unable to determine at this time   Discharge Recommendations Recommend post-acute placement for additional  physical therapy services prior to discharge home       Lola Mcrae DPT

## 2022-03-08 NOTE — DISCHARGE PLANNING
LLOS following this patient, escalated by RN, Yolis    Young man with chronic and debilitating mental health diagnosis, normally supported by [facility] with group home.     However, patient is now experiencing a suspected conversion disorder, with R side weakness, currently non-weight barring on R leg, along with whole R side of body. PT notes report patient is not able to demonstrate mobility.     Due to current mobility issues, [facility] group homes are unable to take him unless he returns to some level of self-care with all ADLs.     SNF locally has all been declined for rehab, due to mental health, insurance, and lack of resources.     [facility] has declined due to lack of resources.     I sent message to Robert with [facility] to possible reconsider. Pending response.     Asked RN/CM to sent referrals to [facility].

## 2022-03-08 NOTE — CARE PLAN
The patient is Stable - Low risk of patient condition declining or worsening    Shift Goals  Clinical Goals: Maintain skin integrity  Patient Goals: Pain control  Family Goals: JUANCARLOS    Progress made toward(s) clinical / shift goals:      Problem: Skin Integrity  Goal: Skin integrity is maintained or improved  Outcome: Progressing   Pt turned and repositioned Q2H or as requested. Linen changes provided as needed to avoid risk of developing pressure ulcers.     Problem: Pain - Standard  Goal: Alleviation of pain or a reduction in pain to the patient’s comfort goal  Outcome: Progressing   Frequent pain assessments throughout shift. Pt educated on available PRN pain medications. Non-pharmacological interventions utilized (I.e. ice pack).     Patient is not progressing towards the following goals:

## 2022-03-08 NOTE — CONSULTS
"[facility] INPATIENT PSYCHIATRIC CONSULT LIAISON NOTE:     DOS: 03/08/2022    REASON FOR CONSULT: \"Patient states sudden onset right sided weakness. Patient has no effort against gravity for R arm or R leg. Facial droop noted. Patient has stuttered speech and endorses HA\"    CC: “I am excited about getting ”    HPI: Norm Prabhakar is a 41 year old male patient who presented to the ED on 2/26/2022 with sudden right-sided weakness and speech changes.  PMH of asthma, DM 2, bipolar disorder, hypothyroidism, seizure disorder, depression/anxiety, HTN, dyslipidemia.  Said he was last normal around 6:30 when symptoms began.  This is a chronic issue for him since 2010 with multiple previous negative MRIs.  Current symptoms are similar to prior, says usually improve in a few days days    Patient was seen today as a follow up consult. Reports sleeping very well last night.  Denies SI/HI/AVH, he is able to slightly move his right upper and lower extremities,today. States I tried walking today.  Denies having any symptoms of depression and or anxiety at this time.      LEGAL HOLD: Voluntary    ALLERGIES: Abilify, Fish, Geodon [ziprasidone hcl], Aripiprazole     PAST MEDICAL HISTORY:  ASTHMA,  Diabetes ([facility]), Fall, Glaucoma (1982), Hypothyroidism, Indigestion,  Murmur, Pneumonia, S/P thyroidectomy, Seizure (HCC) (2010), and Unspecified disorder of thyroid    PAST PSYCHIATRIC HISTORY:  Bipolar Disorder I.   Depression.   Anxiety    LEGAL HISTORY:  Denies    SOCIAL HISTORY:  Quit smoking 22 month ago. Previous alcohol use.    CURRENT HOSPITAL PROBLEMS:  Acute right-sided weakness   Anxiety and depression   Asthma   Class 1 obesity due to excess calories with serious comorbidity and body mass index (BMI) of 32.0 to 32.9 in adult   Conversion disorder   History of seizure   Mixed hyperlipidemia   Postoperative hypothyroidism   Primary hypertension   PTSD (post-traumatic stress disorder)   Type 2 diabetes " "mellitus without complication, without long-term current use of insulin ([facility])       PSYCHIATRIC EXAMNIATION:  VITALS: WNL    MENTAL STATUS EXAM:  Appearance: Dressed in hospital gown, normal grooming and hygiene, no acute  distress.   Attitude: Calm and cooperative.  Behavior: Sitting up by his bedside.   Musculoskeletal: able to slightly move his right upper and lower extremities.  Eye Contact: Adequate.  Speech: Stutter, coherent, adequate volume  Affect: Normal  Mood: \"I am excited about getting \"  Process: Goal-directed, organized. Linear  Content: Negative for Suicidal and Homicidal ideations.  Perception: Negative for auditory and visual hallucinations  Orientation: Oriented to Time, Place, Person and Self.  Memory: No significant deficits elicited  Insight: Fair    LABS:  Unremarkable    CURRENT PSYCHIATRIC MEDICATIONS:  Depakote 1500 mg P.O at HS  Lurasidone 20mg P.O in the evening  Prazosin 4mg P.O every evening  Sertraline 150mg P.O Daily    ASSESSMENT AND PLAN:  -Mood Disorder due to another health conditio- Unspecified  -Functional Neurological disorder  -PTSD-Chronic  -Conversion Disorder (recurrent right sided weakness since 2010, lasts for a few days then gets better. Patient is left handed)    Legal Hold: Voluntary  -No medication changes needed at this time.  -Psych CL will continue following patient while at [facility].  -Medication reconciliation was completed.  -Reviewed safety plan - 911, ER, PCM, MHC, Suicide Crisis Line, Nursing staff while    inpatient.  -Visitors: Yes  -Personal Belongings: Yes  -Observation Level:Tele monitor    -Instruction: Discharge recommendations per treatment team.    "

## 2022-03-08 NOTE — CARE PLAN
The patient is Stable - Low risk of patient condition declining or worsening    Shift Goals  Clinical Goals: Mobilize and educate on diabetic diet  Patient Goals: No goals  Family Goals: JUANCARLOS    Progress made toward(s) clinical / shift goals:  Assisted with ROM and encouraged mobilization. Pt educated on diabetic diet.    Patient is not progressing towards the following goals:    N/A

## 2022-03-08 NOTE — PROGRESS NOTES
Medial aspect of pts antecubital noted to be reddish/pink, warm and painful where prior IV was located. MD notified. Instructed to apply cold compress, monitor the site, and address with the hospitalist tomorrow morning. Instructions relayed to NOC.

## 2022-03-09 ENCOUNTER — APPOINTMENT (OUTPATIENT)
Dept: RADIOLOGY | Facility: MEDICAL CENTER | Age: 42
End: 2022-03-09
Attending: STUDENT IN AN ORGANIZED HEALTH CARE EDUCATION/TRAINING PROGRAM
Payer: MEDICAID

## 2022-03-09 LAB
GLUCOSE BLD STRIP.AUTO-MCNC: 160 MG/DL (ref 65–99)
GLUCOSE BLD STRIP.AUTO-MCNC: 165 MG/DL (ref 65–99)
GLUCOSE BLD STRIP.AUTO-MCNC: 181 MG/DL (ref 65–99)
GLUCOSE BLD STRIP.AUTO-MCNC: 191 MG/DL (ref 65–99)
GLUCOSE BLD STRIP.AUTO-MCNC: 268 MG/DL (ref 65–99)

## 2022-03-09 PROCEDURE — 99224 PR SUBSEQUENT OBSERVATION CARE,LEVEL I: CPT | Performed by: STUDENT IN AN ORGANIZED HEALTH CARE EDUCATION/TRAINING PROGRAM

## 2022-03-09 PROCEDURE — 700102 HCHG RX REV CODE 250 W/ 637 OVERRIDE(OP): Performed by: STUDENT IN AN ORGANIZED HEALTH CARE EDUCATION/TRAINING PROGRAM

## 2022-03-09 PROCEDURE — A9270 NON-COVERED ITEM OR SERVICE: HCPCS | Performed by: STUDENT IN AN ORGANIZED HEALTH CARE EDUCATION/TRAINING PROGRAM

## 2022-03-09 PROCEDURE — 96372 THER/PROPH/DIAG INJ SC/IM: CPT | Mod: XU

## 2022-03-09 PROCEDURE — G0378 HOSPITAL OBSERVATION PER HR: HCPCS

## 2022-03-09 PROCEDURE — 82962 GLUCOSE BLOOD TEST: CPT

## 2022-03-09 PROCEDURE — 93971 EXTREMITY STUDY: CPT | Mod: RT

## 2022-03-09 RX ADMIN — INSULIN GLARGINE 30 UNITS: 100 INJECTION, SOLUTION SUBCUTANEOUS at 17:48

## 2022-03-09 RX ADMIN — PRAZOSIN HYDROCHLORIDE 4 MG: 2 CAPSULE ORAL at 17:48

## 2022-03-09 RX ADMIN — INSULIN HUMAN 5 UNITS: 100 INJECTION, SOLUTION PARENTERAL at 21:33

## 2022-03-09 RX ADMIN — INSULIN HUMAN 2 UNITS: 100 INJECTION, SOLUTION PARENTERAL at 13:30

## 2022-03-09 RX ADMIN — BUDESONIDE AND FORMOTEROL FUMARATE DIHYDRATE 2 PUFF: 160; 4.5 AEROSOL RESPIRATORY (INHALATION) at 17:48

## 2022-03-09 RX ADMIN — INSULIN HUMAN 2 UNITS: 100 INJECTION, SOLUTION PARENTERAL at 08:25

## 2022-03-09 RX ADMIN — BUDESONIDE AND FORMOTEROL FUMARATE DIHYDRATE 2 PUFF: 160; 4.5 AEROSOL RESPIRATORY (INHALATION) at 06:15

## 2022-03-09 RX ADMIN — DIVALPROEX SODIUM 1500 MG: 500 TABLET, DELAYED RELEASE ORAL at 21:30

## 2022-03-09 RX ADMIN — ATORVASTATIN CALCIUM 20 MG: 20 TABLET, FILM COATED ORAL at 17:48

## 2022-03-09 RX ADMIN — INSULIN HUMAN 2 UNITS: 100 INJECTION, SOLUTION PARENTERAL at 17:49

## 2022-03-09 RX ADMIN — RIVAROXABAN 10 MG: 10 TABLET, FILM COATED ORAL at 08:26

## 2022-03-09 RX ADMIN — LEVOTHYROXINE SODIUM 225 MCG: 0.07 TABLET ORAL at 06:15

## 2022-03-09 RX ADMIN — LATANOPROST 1 DROP: 50 SOLUTION OPHTHALMIC at 17:48

## 2022-03-09 RX ADMIN — INSULIN GLARGINE 30 UNITS: 100 INJECTION, SOLUTION SUBCUTANEOUS at 06:15

## 2022-03-09 RX ADMIN — MONTELUKAST 10 MG: 10 TABLET, FILM COATED ORAL at 17:48

## 2022-03-09 RX ADMIN — SERTRALINE 150 MG: 50 TABLET, FILM COATED ORAL at 21:29

## 2022-03-09 RX ADMIN — LISINOPRIL 10 MG: 10 TABLET ORAL at 06:15

## 2022-03-09 RX ADMIN — LURASIDONE HYDROCHLORIDE 20 MG: 20 TABLET, FILM COATED ORAL at 17:47

## 2022-03-09 RX ADMIN — SENNOSIDES AND DOCUSATE SODIUM 2 TABLET: 50; 8.6 TABLET ORAL at 17:48

## 2022-03-09 ASSESSMENT — ENCOUNTER SYMPTOMS
BLURRED VISION: 1
NAUSEA: 0
SPEECH CHANGE: 1
FOCAL WEAKNESS: 1
SHORTNESS OF BREATH: 0
COUGH: 0
SORE THROAT: 1
CHILLS: 0
SEIZURES: 0
SENSORY CHANGE: 0
VOMITING: 0
FEVER: 0
ABDOMINAL PAIN: 0
FALLS: 0

## 2022-03-09 ASSESSMENT — PAIN DESCRIPTION - PAIN TYPE: TYPE: ACUTE PAIN

## 2022-03-09 NOTE — PROGRESS NOTES
Hospital Medicine Daily Progress Note    Date of Service  3/9/2022    Chief Complaint  Norm Prabhakar is a 41 y.o. male admitted 2/26/2022 with right arm weakness and stuttering.    Hospital Course  No notes on file    Interval Problem Update  3/9/2022  Stable vitals and afebrile.     At bedsideright AC area with erythema and swelling - some improvement but still some tenderness. Current care plan discussed - pending placement or DC to group home       Declined by all local SNF's due to mental health, insurance and lack of resources  Continue rehabbing in house, strength improving  Patient is medically cleared for discharge.   CM team working on placement. Requested [facility] eval and [facility]    I have personally seen and examined the patient at bedside. I discussed the plan of care with patient, bedside RN, charge RN and .    Consultants/Specialty  neurology, psychiatry and physiatry    Code Status  Full Code    Disposition  Patient is medically cleared for discharge.   Anticipate discharge to group home vs SNF/IPR placement  I have placed the appropriate orders for post-discharge needs.    Review of Systems  Review of Systems   Constitutional: Negative for chills and fever.   HENT: Positive for sore throat.    Eyes: Positive for blurred vision (vision loss of R eye chronically).   Respiratory: Negative for cough and shortness of breath.    Cardiovascular: Negative for chest pain and leg swelling.   Gastrointestinal: Negative for abdominal pain, nausea and vomiting.   Genitourinary: Negative for hematuria.   Musculoskeletal: Negative for falls.   Skin: Negative for rash.   Neurological: Positive for speech change and focal weakness. Negative for sensory change and seizures.        Physical Exam  Temp:  [36.1 °C (96.9 °F)-36.8 °C (98.2 °F)] 36.4 °C (97.6 °F)  Pulse:  [58-89] 63  Resp:  [17-20] 17  BP: (108-120)/(67-79) 117/79  SpO2:  [93 %-96 %] 93 %    Physical Exam  Vitals and nursing note  reviewed.   Constitutional:       General: He is not in acute distress.     Appearance: He is obese. He is not ill-appearing, toxic-appearing or diaphoretic.   HENT:      Head: Normocephalic and atraumatic.      Right Ear: External ear normal.      Left Ear: External ear normal.      Nose: Nose normal.      Mouth/Throat:      Mouth: Mucous membranes are moist.      Pharynx: Oropharynx is clear.   Eyes:      General: No scleral icterus.     Extraocular Movements: Extraocular movements intact.   Neck:      Thyroid: Thyroid mass present. No thyroid tenderness.   Cardiovascular:      Rate and Rhythm: Normal rate and regular rhythm.      Heart sounds: No murmur heard.  Pulmonary:      Effort: Pulmonary effort is normal.      Breath sounds: Normal breath sounds.   Abdominal:      General: Bowel sounds are normal.      Palpations: Abdomen is soft.      Tenderness: There is no abdominal tenderness. There is no guarding or rebound.   Musculoskeletal:         General: Swelling (Right AC with mild erythema ) present. No deformity.   Skin:     Coloration: Skin is not jaundiced.      Findings: No bruising.   Neurological:      Mental Status: He is alert and oriented to person, place, and time.      Cranial Nerves: No cranial nerve deficit.      Sensory: No sensory deficit.      Motor: Weakness present.      Comments: RUE 3-4/5 strength, RLE 3/5 strength   Psychiatric:         Mood and Affect: Mood normal.         Behavior: Behavior normal.         Fluids    Intake/Output Summary (Last 24 hours) at 3/9/2022 1358  Last data filed at 3/9/2022 0600  Gross per 24 hour   Intake --   Output 1000 ml   Net -1000 ml       Laboratory                        Imaging  US-EXTREMITY VENOUS UPPER UNILAT RIGHT   Final Result      DX-CHEST-PORTABLE (1 VIEW)   Final Result      No acute cardiopulmonary abnormality.      CT-CTA NECK WITH & W/O-POST PROCESSING   Final Result      1.  No occlusion or hemodynamically significant stenosis of the neck  arteries.   2.  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      CT-CTA HEAD WITH & W/O-POST PROCESS   Final Result      CT angiogram of the [place] within normal limits.      CT-CEREBRAL PERFUSION ANALYSIS   Final Result      1.  Cerebral blood flow less than 30% likely representing completed infarct = 0 mL.      2.  T Max more than 6 seconds likely representing combination of completed infarct and ischemia = 0 mL.      3.  Mismatched volume likely representing ischemic brain/penumbra = None      4.  Please note that the cerebral perfusion was performed on the limited brain tissue around the basal ganglia region. Infarct/ischemia outside the CT perfusion sections can be missed in this study.      CT-HEAD W/O   Final Result      1.  Confluent cerebral white matter hypodensities again noted which may represent severe chronic leukoencephalopathy..   2.  No acute intracranial abnormality.              Assessment/Plan  * Acute right-sided weakness- (present on admission)  Assessment & Plan  Chronic recurrent issue, per our records he has had 19 MRI head since 2011 with the majority ordered for right-sided weakness  CT scans in the ED negative for acute abnormality, chronic moderate cerebellar loss  Patient says this is similar to his previous episodes and symptoms usually resolve in a few days or less  On exam 0/5 strength, however while talking to the patient his right toe and foot do move  Weakness likely due to conversion disorder  Psychiatry following  Patient refused by all SNFs, not a rehab candidate  Medically cleared for discharge to group home, GABY working on group home placement    Conversion disorder- (present on admission)  Assessment & Plan  Recurrent right-sided weakness, admitted again for this  Psych consult placed     Currently on  Depakote 1500 mg P.O at HS  Lurasidone 20mg P.O in the evening  Prazosin 4mg P.O every evening  Sertraline 150mg P.O  Daily    Primary hypertension- (present on admission)  Assessment & Plan  Continue lisinopril    Mixed hyperlipidemia- (present on admission)  Assessment & Plan  Continue atorvastatin    Class 1 obesity due to excess calories with serious comorbidity and body mass index (BMI) of 32.0 to 32.9 in adult- (present on admission)  Assessment & Plan  BMI 32.7    Type 2 diabetes mellitus without complication, without long-term current use of insulin (HCC)- (present on admission)  Assessment & Plan  Hold home orals.  Continue insulin glargine  Insulin sliding scale hypoglycemia protocol in the hospital    PTSD (post-traumatic stress disorder)- (present on admission)  Assessment & Plan  Continue sertraline and prazosin    History of seizure- (present on admission)  Assessment & Plan  Continue divalproex    Anxiety and depression- (present on admission)  Assessment & Plan  Continue sertraline    Postoperative hypothyroidism- (present on admission)  Assessment & Plan  Continue levothyroxine  Thyroid mass, h/o goiter  CT neck shows thyroid mass  TSH 8.1       Asthma- (present on admission)  Assessment & Plan  Continue montelukast and home inhaler       VTE prophylaxis: enoxaparin ppx    I have performed a physical exam and reviewed and updated ROS and Plan today (3/9/2022).

## 2022-03-09 NOTE — CARE PLAN
The patient is Stable - Low risk of patient condition declining or worsening    Shift Goals  Clinical Goals: Maintain skin integrity  Patient Goals: Ice pack  Family Goals: JUANCARLOS    Progress made toward(s) clinical / shift goals:      Problem: Skin Integrity  Goal: Skin integrity is maintained or improved  Outcome: Progressing  Pt turned and repositioned Q2H or as requested. Linen changes provided as needed to avoid risk of developing pressure ulcers.      Patient is not progressing towards the following goals:

## 2022-03-09 NOTE — PROGRESS NOTES
Hospital Medicine Daily Progress Note    Date of Service  3/8/2022    Chief Complaint  Norm Prabhakar is a 41 y.o. male admitted 2/26/2022 with right arm weakness and stuttering.    Hospital Course  No notes on file    Interval Problem Update  3/8/2022  Stable vitals and afebrile.   Work with PT/OT earlier today - still recommend post acute care placement.    Difficult using RUE/ weakness in RUE   At bedside, c/o pain in right AC area with erythema and swelling. Concern about infection from prior IV site (has noticed for a few days)  He also requested eye drops for right eye.     Ultimate plan is to DC to group home.     Psychiatry follow up appreciated - no changes in meds    Declined by all local SNF's due to mental health, insurance and lack of resources  Continue rehabbing in house, strength improving  Patient is medically cleared for discharge.   CM team working on placement.     I have personally seen and examined the patient at bedside. I discussed the plan of care with patient, bedside RN, charge RN and .    Consultants/Specialty  neurology, psychiatry and physiatry    Code Status  Full Code    Disposition  Patient is medically cleared for discharge.   Anticipate discharge to group home vs SNF/IPR placement  I have placed the appropriate orders for post-discharge needs.    Review of Systems  Review of Systems   Constitutional: Negative for chills and fever.   HENT: Positive for sore throat.    Eyes: Positive for blurred vision (vision loss of R eye chronically).   Respiratory: Negative for cough and shortness of breath.    Cardiovascular: Negative for chest pain and leg swelling.   Gastrointestinal: Negative for abdominal pain, nausea and vomiting.   Genitourinary: Negative for hematuria.   Musculoskeletal: Negative for falls.   Skin: Negative for rash.   Neurological: Positive for speech change and focal weakness. Negative for sensory change and seizures.        Physical Exam  Temp:  [36.1 °C  (96.9 °F)] 36.1 °C (96.9 °F)  Pulse:  [58-88] 58  Resp:  [17-18] 17  BP: (105-113)/(67-75) 113/67  SpO2:  [93 %-96 %] 93 %    Physical Exam  Vitals and nursing note reviewed.   Constitutional:       General: He is not in acute distress.     Appearance: He is obese. He is not ill-appearing, toxic-appearing or diaphoretic.   HENT:      Head: Normocephalic and atraumatic.      Right Ear: External ear normal.      Left Ear: External ear normal.      Nose: Nose normal.      Mouth/Throat:      Mouth: Mucous membranes are moist.      Pharynx: Oropharynx is clear.   Eyes:      General: No scleral icterus.     Extraocular Movements: Extraocular movements intact.   Neck:      Thyroid: Thyroid mass present. No thyroid tenderness.   Cardiovascular:      Rate and Rhythm: Normal rate and regular rhythm.      Heart sounds: No murmur heard.  Pulmonary:      Effort: Pulmonary effort is normal.      Breath sounds: Normal breath sounds.   Abdominal:      General: Bowel sounds are normal.      Palpations: Abdomen is soft.      Tenderness: There is no abdominal tenderness. There is no guarding or rebound.   Musculoskeletal:         General: Swelling (Right AC with mild erythema ) present. No deformity.   Skin:     Coloration: Skin is not jaundiced.      Findings: No bruising.   Neurological:      Mental Status: He is alert and oriented to person, place, and time.      Cranial Nerves: No cranial nerve deficit.      Sensory: No sensory deficit.      Motor: Weakness present.      Comments: RUE 3-4/5 strength, RLE 3/5 strength   Psychiatric:         Mood and Affect: Mood normal.         Behavior: Behavior normal.         Fluids    Intake/Output Summary (Last 24 hours) at 3/8/2022 2044  Last data filed at 3/8/2022 1000  Gross per 24 hour   Intake 120 ml   Output 1550 ml   Net -1430 ml       Laboratory                        Imaging  DX-CHEST-PORTABLE (1 VIEW)   Final Result      No acute cardiopulmonary abnormality.      CT-CTA NECK WITH &  W/O-POST PROCESSING   Final Result      1.  No occlusion or hemodynamically significant stenosis of the neck arteries.   2.  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      CT-CTA HEAD WITH & W/O-POST PROCESS   Final Result      CT angiogram of the [place] within normal limits.      CT-CEREBRAL PERFUSION ANALYSIS   Final Result      1.  Cerebral blood flow less than 30% likely representing completed infarct = 0 mL.      2.  T Max more than 6 seconds likely representing combination of completed infarct and ischemia = 0 mL.      3.  Mismatched volume likely representing ischemic brain/penumbra = None      4.  Please note that the cerebral perfusion was performed on the limited brain tissue around the basal ganglia region. Infarct/ischemia outside the CT perfusion sections can be missed in this study.      CT-HEAD W/O   Final Result      1.  Confluent cerebral white matter hypodensities again noted which may represent severe chronic leukoencephalopathy..   2.  No acute intracranial abnormality.         US-EXTREMITY VENOUS UPPER UNILAT RIGHT    (Results Pending)        Assessment/Plan  * Acute right-sided weakness- (present on admission)  Assessment & Plan  Chronic recurrent issue, per our records he has had 19 MRI head since 2011 with the majority ordered for right-sided weakness  CT scans in the ED negative for acute abnormality, chronic moderate cerebellar loss  Patient says this is similar to his previous episodes and symptoms usually resolve in a few days or less  On exam 0/5 strength, however while talking to the patient his right toe and foot do move  Weakness likely due to conversion disorder  Psychiatry following  Patient refused by all SNFs, not a rehab candidate  Medically cleared for discharge to group home, SW working on group home placement    Conversion disorder- (present on admission)  Assessment & Plan  Recurrent right-sided weakness, admitted again  for this  Psych consult placed     Currently on  Depakote 1500 mg P.O at HS  Lurasidone 20mg P.O in the evening  Prazosin 4mg P.O every evening  Sertraline 150mg P.O Daily    Primary hypertension- (present on admission)  Assessment & Plan  Continue lisinopril    Mixed hyperlipidemia- (present on admission)  Assessment & Plan  Continue atorvastatin    Class 1 obesity due to excess calories with serious comorbidity and body mass index (BMI) of 32.0 to 32.9 in adult- (present on admission)  Assessment & Plan  BMI 32.7    Type 2 diabetes mellitus without complication, without long-term current use of insulin (HCC)- (present on admission)  Assessment & Plan  Hold home orals.  Continue insulin glargine  Insulin sliding scale hypoglycemia protocol in the hospital    PTSD (post-traumatic stress disorder)- (present on admission)  Assessment & Plan  Continue sertraline and prazosin    History of seizure- (present on admission)  Assessment & Plan  Continue divalproex    Anxiety and depression- (present on admission)  Assessment & Plan  Continue sertraline    Postoperative hypothyroidism- (present on admission)  Assessment & Plan  Continue levothyroxine  Thyroid mass, h/o goiter  CT neck shows thyroid mass  TSH 8.1       Asthma- (present on admission)  Assessment & Plan  Continue montelukast and home inhaler       VTE prophylaxis: enoxaparin ppx    I have performed a physical exam and reviewed and updated ROS and Plan today (3/8/2022).

## 2022-03-10 LAB
GLUCOSE BLD STRIP.AUTO-MCNC: 155 MG/DL (ref 65–99)
GLUCOSE BLD STRIP.AUTO-MCNC: 162 MG/DL (ref 65–99)
GLUCOSE BLD STRIP.AUTO-MCNC: 163 MG/DL (ref 65–99)
GLUCOSE BLD STRIP.AUTO-MCNC: 183 MG/DL (ref 65–99)
GLUCOSE BLD STRIP.AUTO-MCNC: 208 MG/DL (ref 65–99)

## 2022-03-10 PROCEDURE — 96372 THER/PROPH/DIAG INJ SC/IM: CPT

## 2022-03-10 PROCEDURE — A9270 NON-COVERED ITEM OR SERVICE: HCPCS | Performed by: STUDENT IN AN ORGANIZED HEALTH CARE EDUCATION/TRAINING PROGRAM

## 2022-03-10 PROCEDURE — 82962 GLUCOSE BLOOD TEST: CPT | Mod: 91

## 2022-03-10 PROCEDURE — G0378 HOSPITAL OBSERVATION PER HR: HCPCS

## 2022-03-10 PROCEDURE — 99224 PR SUBSEQUENT OBSERVATION CARE,LEVEL I: CPT | Performed by: STUDENT IN AN ORGANIZED HEALTH CARE EDUCATION/TRAINING PROGRAM

## 2022-03-10 PROCEDURE — 700102 HCHG RX REV CODE 250 W/ 637 OVERRIDE(OP): Performed by: STUDENT IN AN ORGANIZED HEALTH CARE EDUCATION/TRAINING PROGRAM

## 2022-03-10 RX ADMIN — LISINOPRIL 10 MG: 10 TABLET ORAL at 06:17

## 2022-03-10 RX ADMIN — INSULIN HUMAN 2 UNITS: 100 INJECTION, SOLUTION PARENTERAL at 22:57

## 2022-03-10 RX ADMIN — BUDESONIDE AND FORMOTEROL FUMARATE DIHYDRATE 2 PUFF: 160; 4.5 AEROSOL RESPIRATORY (INHALATION) at 17:11

## 2022-03-10 RX ADMIN — INSULIN HUMAN 3 UNITS: 100 INJECTION, SOLUTION PARENTERAL at 17:07

## 2022-03-10 RX ADMIN — PRAZOSIN HYDROCHLORIDE 4 MG: 2 CAPSULE ORAL at 17:11

## 2022-03-10 RX ADMIN — LURASIDONE HYDROCHLORIDE 20 MG: 20 TABLET, FILM COATED ORAL at 17:11

## 2022-03-10 RX ADMIN — INSULIN HUMAN 2 UNITS: 100 INJECTION, SOLUTION PARENTERAL at 12:26

## 2022-03-10 RX ADMIN — INSULIN GLARGINE 30 UNITS: 100 INJECTION, SOLUTION SUBCUTANEOUS at 17:07

## 2022-03-10 RX ADMIN — MONTELUKAST 10 MG: 10 TABLET, FILM COATED ORAL at 17:11

## 2022-03-10 RX ADMIN — BUDESONIDE AND FORMOTEROL FUMARATE DIHYDRATE 2 PUFF: 160; 4.5 AEROSOL RESPIRATORY (INHALATION) at 06:17

## 2022-03-10 RX ADMIN — LATANOPROST 1 DROP: 50 SOLUTION OPHTHALMIC at 17:11

## 2022-03-10 RX ADMIN — INSULIN GLARGINE 30 UNITS: 100 INJECTION, SOLUTION SUBCUTANEOUS at 06:17

## 2022-03-10 RX ADMIN — INSULIN HUMAN 2 UNITS: 100 INJECTION, SOLUTION PARENTERAL at 08:31

## 2022-03-10 RX ADMIN — DIVALPROEX SODIUM 1500 MG: 500 TABLET, DELAYED RELEASE ORAL at 22:51

## 2022-03-10 RX ADMIN — LEVOTHYROXINE SODIUM 225 MCG: 0.07 TABLET ORAL at 06:17

## 2022-03-10 RX ADMIN — ATORVASTATIN CALCIUM 20 MG: 20 TABLET, FILM COATED ORAL at 17:11

## 2022-03-10 RX ADMIN — RIVAROXABAN 10 MG: 10 TABLET, FILM COATED ORAL at 08:39

## 2022-03-10 RX ADMIN — SERTRALINE 150 MG: 50 TABLET, FILM COATED ORAL at 22:52

## 2022-03-10 ASSESSMENT — ENCOUNTER SYMPTOMS
FALLS: 0
SORE THROAT: 1
FOCAL WEAKNESS: 1
SENSORY CHANGE: 0
SEIZURES: 0
CHILLS: 0
SHORTNESS OF BREATH: 0
SPEECH CHANGE: 1
ABDOMINAL PAIN: 0
FEVER: 0
COUGH: 0
VOMITING: 0
BLURRED VISION: 1
NAUSEA: 0

## 2022-03-10 ASSESSMENT — PAIN DESCRIPTION - PAIN TYPE: TYPE: ACUTE PAIN

## 2022-03-10 NOTE — CONSULTS
BRIEF PSYCHIATRIC CONSULT NOTE: being changed. Reviewed chart. Appears to be improving in mobility and mood stable.

## 2022-03-10 NOTE — CARE PLAN
The patient is Stable - Low risk of patient condition declining or worsening    Shift Goals  Clinical Goals: Monitor neuro status, safety, mobilization, maintain skin integrity  Patient Goals: Rest  Family Goals: JUANCARLOS    Progress made toward(s) clinical / shift goals:  Pt AAOx4 throughout shift, continued to have right sided weakness and stuttered speech. Denies pain. Fall precautions in place. POC discussed with pt. Will continue hourly rounding.     Patient is not progressing towards the following goals:

## 2022-03-10 NOTE — PROGRESS NOTES
[facility] Medicine Daily Progress Note    Date of Service  3/10/2022    Chief Complaint  Norm Prabhakar is a 41 y.o. male admitted 2/26/2022 with right arm weakness and stuttering.    Hospital Course  Norm Prabhakar is a 41 y.o. male with conversion disorder, syncope, DM2, hypothyroid, seizure, HTN, PTSD, depression and recurrent R-sided weakness and sluttering (prior normal MRIs) who presented 2/26/2022 with right-sided weakness and speech changes. Presenting  symptoms were similar to prior presentations in recent past. CT head, CT head perfusion, CTA head and neck in the ED negative for acute abnormality.  Neurology consulted in the ED, conversion disorder suspected.  Recommended admission for observation and psych consult.    Psychiatry evaluated the patient - functional neurological disorder was considered. PT/OT evaluated the patient - recommended placement to SNF. Since then, CM/SW team has been working on placement. While pending placement, Psychiatry continued to follow and PT/OT continued to work with Pt for right-sided weakness.     Interval Problem Update  3/10/2022  Stable vitals and afebrile.     At bedside, playing game on his laptop. Right AC erythema and pain improving. Medically cleared for DC and challenge has been the placement. Mood stable.     Declined by all local SNF's due to mental health, insurance and lack of resources  Continue rehabbing in house, strength improving  Patient is medically cleared for discharge.   CM team working on placement. Requested [facility] manuel and [facility]    I have personally seen and examined the patient at bedside. I discussed the plan of care with patient, bedside RN, charge RN and .    Consultants/Specialty  neurology, psychiatry and physiatry    Code Status  Full Code    Disposition  Patient is medically cleared for discharge.   Anticipate discharge to group home vs SNF/IPR placement  I have placed the appropriate orders for post-discharge  needs.    Review of Systems  Review of Systems   Constitutional: Negative for chills and fever.   HENT: Positive for sore throat.    Eyes: Positive for blurred vision (vision loss of R eye chronically).   Respiratory: Negative for cough and shortness of breath.    Cardiovascular: Negative for chest pain and leg swelling.   Gastrointestinal: Negative for abdominal pain, nausea and vomiting.   Genitourinary: Negative for hematuria.   Musculoskeletal: Negative for falls.   Skin: Negative for rash.   Neurological: Positive for speech change and focal weakness. Negative for sensory change and seizures.        Physical Exam  Temp:  [36 °C (96.8 °F)-36.6 °C (97.9 °F)] 36.6 °C (97.9 °F)  Pulse:  [58-82] 61  Resp:  [17-18] 18  BP: (116-127)/(72-82) 116/72  SpO2:  [93 %-98 %] 98 %    Physical Exam  Vitals and nursing note reviewed.   Constitutional:       General: He is not in acute distress.     Appearance: He is obese. He is not ill-appearing, toxic-appearing or diaphoretic.   HENT:      Head: Normocephalic and atraumatic.      Right Ear: External ear normal.      Left Ear: External ear normal.      Nose: Nose normal.      Mouth/Throat:      Mouth: Mucous membranes are moist.      Pharynx: Oropharynx is clear.   Eyes:      General: No scleral icterus.     Extraocular Movements: Extraocular movements intact.   Neck:      Thyroid: Thyroid mass present. No thyroid tenderness.   Cardiovascular:      Rate and Rhythm: Normal rate and regular rhythm.      Heart sounds: No murmur heard.  Pulmonary:      Effort: Pulmonary effort is normal.      Breath sounds: Normal breath sounds.   Abdominal:      General: Bowel sounds are normal.      Palpations: Abdomen is soft.      Tenderness: There is no abdominal tenderness. There is no guarding or rebound.   Musculoskeletal:         General: Swelling (Right AC with mild erythema ) present. No deformity.   Skin:     Coloration: Skin is not jaundiced.      Findings: No bruising.    Neurological:      Mental Status: He is alert and oriented to person, place, and time.      Cranial Nerves: No cranial nerve deficit.      Sensory: No sensory deficit.      Motor: Weakness present.      Comments: RUE 3-4/5 strength, RLE 3/5 strength   Psychiatric:         Mood and Affect: Mood normal.         Behavior: Behavior normal.         Fluids  No intake or output data in the 24 hours ending 03/10/22 1507    Laboratory                        Imaging  US-EXTREMITY VENOUS UPPER UNILAT RIGHT   Final Result      DX-CHEST-PORTABLE (1 VIEW)   Final Result      No acute cardiopulmonary abnormality.      CT-CTA NECK WITH & W/O-POST PROCESSING   Final Result      1.  No occlusion or hemodynamically significant stenosis of the neck arteries.   2.  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      CT-CTA HEAD WITH & W/O-POST PROCESS   Final Result      CT angiogram of the [place] within normal limits.      CT-CEREBRAL PERFUSION ANALYSIS   Final Result      1.  Cerebral blood flow less than 30% likely representing completed infarct = 0 mL.      2.  T Max more than 6 seconds likely representing combination of completed infarct and ischemia = 0 mL.      3.  Mismatched volume likely representing ischemic brain/penumbra = None      4.  Please note that the cerebral perfusion was performed on the limited brain tissue around the basal ganglia region. Infarct/ischemia outside the CT perfusion sections can be missed in this study.      CT-HEAD W/O   Final Result      1.  Confluent cerebral white matter hypodensities again noted which may represent severe chronic leukoencephalopathy..   2.  No acute intracranial abnormality.              Assessment/Plan  * Acute right-sided weakness- (present on admission)  Assessment & Plan  Chronic recurrent issue, per our records he has had 19 MRI head since 2011 with the majority ordered for right-sided weakness  CT scans in the ED negative  for acute abnormality, chronic moderate cerebellar loss  Patient says this is similar to his previous episodes and symptoms usually resolve in a few days or less  On exam 0/5 strength, however while talking to the patient his right toe and foot do move  Weakness likely due to conversion disorder  Psychiatry following  Patient refused by all SNFs, not a rehab candidate  Medically cleared for discharge to group home, GABY working on group home placement    Conversion disorder- (present on admission)  Assessment & Plan  Recurrent right-sided weakness, admitted again for this  Psych consult placed     Currently on  Depakote 1500 mg P.O at HS  Lurasidone 20mg P.O in the evening  Prazosin 4mg P.O every evening  Sertraline 150mg P.O Daily    Primary hypertension- (present on admission)  Assessment & Plan  Continue lisinopril    Mixed hyperlipidemia- (present on admission)  Assessment & Plan  Continue atorvastatin    Class 1 obesity due to excess calories with serious comorbidity and body mass index (BMI) of 32.0 to 32.9 in adult- (present on admission)  Assessment & Plan  BMI 32.7    Type 2 diabetes mellitus without complication, without long-term current use of insulin (HCC)- (present on admission)  Assessment & Plan  Hold home orals.  Continue insulin glargine  Insulin sliding scale hypoglycemia protocol in the hospital    PTSD (post-traumatic stress disorder)- (present on admission)  Assessment & Plan  Continue sertraline and prazosin    History of seizure- (present on admission)  Assessment & Plan  Continue divalproex    Anxiety and depression- (present on admission)  Assessment & Plan  Continue sertraline    Postoperative hypothyroidism- (present on admission)  Assessment & Plan  Continue levothyroxine  Thyroid mass, h/o goiter  CT neck shows thyroid mass  TSH 8.1       Asthma- (present on admission)  Assessment & Plan  Continue montelukast and home inhaler       VTE prophylaxis: enoxaparin ppx    I have performed a  physical exam and reviewed and updated ROS and Plan today (3/10/2022).

## 2022-03-10 NOTE — DISCHARGE PLANNING
Anticipated Discharge Disposition: group home    Action: CM met with the patient to inquire if I can speak with his mother and he agreed.  [facility]  CM attempted to speak with the patients mother however the phone kept ringing. CM received a VMM from Philip of [facility] stating in order for the patient to go to another home and [facility] pay for it, it has to be a state certified home.  Other homes, the patient will have to private pay. CM noted the last PT note was from 3/8.  CM sent a voalte request to the nurse requesting for therapy to see the patient.     Barriers to Discharge: placement.      Plan: CM has already escalated this to leadership. CM will continue to follow.

## 2022-03-10 NOTE — CARE PLAN
The patient is Stable - Low risk of patient condition declining or worsening    Shift Goals  Clinical Goals: Maintain skin integrity  Patient Goals: Sleep  Family Goals: JUANCARLOS    Progress made toward(s) clinical / shift goals:      Problem: Skin Integrity  Goal: Skin integrity is maintained or improved  Outcome: Progressing   Pt able to turn self in bed. Barrier cream provided if needed.     Patient is not progressing towards the following goals:

## 2022-03-11 LAB
GLUCOSE BLD STRIP.AUTO-MCNC: 139 MG/DL (ref 65–99)
GLUCOSE BLD STRIP.AUTO-MCNC: 159 MG/DL (ref 65–99)
GLUCOSE BLD STRIP.AUTO-MCNC: 168 MG/DL (ref 65–99)
GLUCOSE BLD STRIP.AUTO-MCNC: 191 MG/DL (ref 65–99)
GLUCOSE BLD STRIP.AUTO-MCNC: 272 MG/DL (ref 65–99)

## 2022-03-11 PROCEDURE — 700102 HCHG RX REV CODE 250 W/ 637 OVERRIDE(OP): Performed by: STUDENT IN AN ORGANIZED HEALTH CARE EDUCATION/TRAINING PROGRAM

## 2022-03-11 PROCEDURE — A9270 NON-COVERED ITEM OR SERVICE: HCPCS | Performed by: STUDENT IN AN ORGANIZED HEALTH CARE EDUCATION/TRAINING PROGRAM

## 2022-03-11 PROCEDURE — 97116 GAIT TRAINING THERAPY: CPT

## 2022-03-11 PROCEDURE — 99224 PR SUBSEQUENT OBSERVATION CARE,LEVEL I: CPT | Performed by: STUDENT IN AN ORGANIZED HEALTH CARE EDUCATION/TRAINING PROGRAM

## 2022-03-11 PROCEDURE — G0378 HOSPITAL OBSERVATION PER HR: HCPCS

## 2022-03-11 PROCEDURE — 82962 GLUCOSE BLOOD TEST: CPT | Mod: 91

## 2022-03-11 PROCEDURE — 96372 THER/PROPH/DIAG INJ SC/IM: CPT | Mod: XU

## 2022-03-11 RX ADMIN — LURASIDONE HYDROCHLORIDE 20 MG: 20 TABLET, FILM COATED ORAL at 17:39

## 2022-03-11 RX ADMIN — MONTELUKAST 10 MG: 10 TABLET, FILM COATED ORAL at 17:38

## 2022-03-11 RX ADMIN — INSULIN GLARGINE 30 UNITS: 100 INJECTION, SOLUTION SUBCUTANEOUS at 05:43

## 2022-03-11 RX ADMIN — INSULIN HUMAN 5 UNITS: 100 INJECTION, SOLUTION PARENTERAL at 21:02

## 2022-03-11 RX ADMIN — LEVOTHYROXINE SODIUM 225 MCG: 0.07 TABLET ORAL at 05:43

## 2022-03-11 RX ADMIN — PRAZOSIN HYDROCHLORIDE 4 MG: 2 CAPSULE ORAL at 17:38

## 2022-03-11 RX ADMIN — SERTRALINE 150 MG: 50 TABLET, FILM COATED ORAL at 20:54

## 2022-03-11 RX ADMIN — LATANOPROST 1 DROP: 50 SOLUTION OPHTHALMIC at 17:39

## 2022-03-11 RX ADMIN — ATORVASTATIN CALCIUM 20 MG: 20 TABLET, FILM COATED ORAL at 17:38

## 2022-03-11 RX ADMIN — LISINOPRIL 10 MG: 10 TABLET ORAL at 05:43

## 2022-03-11 RX ADMIN — INSULIN HUMAN 2 UNITS: 100 INJECTION, SOLUTION PARENTERAL at 11:58

## 2022-03-11 RX ADMIN — INSULIN GLARGINE 30 UNITS: 100 INJECTION, SOLUTION SUBCUTANEOUS at 17:41

## 2022-03-11 RX ADMIN — RIVAROXABAN 10 MG: 10 TABLET, FILM COATED ORAL at 08:10

## 2022-03-11 RX ADMIN — DIVALPROEX SODIUM 1500 MG: 500 TABLET, DELAYED RELEASE ORAL at 20:54

## 2022-03-11 RX ADMIN — BUDESONIDE AND FORMOTEROL FUMARATE DIHYDRATE 2 PUFF: 160; 4.5 AEROSOL RESPIRATORY (INHALATION) at 05:43

## 2022-03-11 RX ADMIN — BUDESONIDE AND FORMOTEROL FUMARATE DIHYDRATE 2 PUFF: 160; 4.5 AEROSOL RESPIRATORY (INHALATION) at 17:49

## 2022-03-11 RX ADMIN — INSULIN HUMAN 2 UNITS: 100 INJECTION, SOLUTION PARENTERAL at 17:40

## 2022-03-11 ASSESSMENT — GAIT ASSESSMENTS
DISTANCE (FEET): 20
ASSISTIVE DEVICE: FRONT WHEEL WALKER
GAIT LEVEL OF ASSIST: CONTACT GUARD ASSIST

## 2022-03-11 ASSESSMENT — ENCOUNTER SYMPTOMS
VOMITING: 0
COUGH: 0
FOCAL WEAKNESS: 1
SORE THROAT: 1
SEIZURES: 0
ABDOMINAL PAIN: 0
BLURRED VISION: 1
NAUSEA: 0
CHILLS: 0
FEVER: 0
FALLS: 0
SPEECH CHANGE: 1
SENSORY CHANGE: 0
SHORTNESS OF BREATH: 0

## 2022-03-11 ASSESSMENT — COGNITIVE AND FUNCTIONAL STATUS - GENERAL
MOVING TO AND FROM BED TO CHAIR: UNABLE
MOBILITY SCORE: 10
STANDING UP FROM CHAIR USING ARMS: A LITTLE
CLIMB 3 TO 5 STEPS WITH RAILING: TOTAL
TURNING FROM BACK TO SIDE WHILE IN FLAT BAD: UNABLE
SUGGESTED CMS G CODE MODIFIER MOBILITY: CL
WALKING IN HOSPITAL ROOM: A LITTLE
MOVING FROM LYING ON BACK TO SITTING ON SIDE OF FLAT BED: UNABLE

## 2022-03-11 ASSESSMENT — PAIN DESCRIPTION - PAIN TYPE
TYPE: ACUTE PAIN
TYPE: ACUTE PAIN

## 2022-03-11 NOTE — THERAPY
"Physical Therapy   Daily Treatment     Patient Name: Norm Prabhakar  Age:  41 y.o., Sex:  male  Medical Record #: [id number]  Today's Date: 3/11/2022     Precautions  Precautions: (P) Fall Risk    Assessment    Pt tolerates treatment session well, no pain or LOB, no SOB. Pt demonstrates improving standing balance and ambulation distance, demonstrates supine to sit EOB with supervision and use of railings, demonstrates improved ROM/strength of R LE and UE. Pt will benefit from continued PT services in acute setting, as well as in post acute setting.         Plan    Continue current treatment plan.    DC Equipment Recommendations: (P) Unable to determine at this time  Discharge Recommendations: (P) Recommend post-acute placement for additional physical therapy services prior to discharge home      Subjective    \"My legs feel shaky.\"       03/11/22 4925   Precautions   Precautions Fall Risk   Pain 0 - 10 Group   Therapist Pain Assessment 0;Post Activity Pain Same as Prior to Activity   Cognition    Cognition / Consciousness X   Level of Consciousness Alert   Ability To Follow Commands 2 Step   Attention Impaired   Strength Lower Body   Comments improving but continues to fatigue easily during ambulation, instability in R LE   Balance   Sitting Balance (Static) Fair +   Sitting Balance (Dynamic) Fair +   Standing Balance (Static) Fair -   Standing Balance (Dynamic) Fair -   Weight Shift Sitting Fair   Weight Shift Standing Fair   Comments with FWW   Gait Analysis   Gait Level Of Assist Contact Guard Assist   Assistive Device Front Wheel Walker   Distance (Feet) 20   # of Times Distance was Traveled 1   Comments pt reports \"shakiness of legs\" near end of ambulation   Bed Mobility    Supine to Sit Supervised   Scooting Supervised   Functional Mobility   Sit to Stand Standby Assist   Bed, Chair, Wheelchair Transfer Standby Assist   Skilled Intervention Verbal Cuing;Sequencing   How much difficulty does the patient currently " have...   Turning over in bed (including adjusting bedclothes, sheets and blankets)? 1   Sitting down on and standing up from a chair with arms (e.g., wheelchair, bedside commode, etc.) 1   Moving from lying on back to sitting on the side of the bed? 1   How much help from another person does the patient currently need...   Moving to and from a bed to a chair (including a wheelchair)? 3   Need to walk in a hospital room? 3   Climbing 3-5 steps with a railing? 1   6 clicks Mobility Score 10   Activity Tolerance   Sitting in Chair left seated in chair   Sitting Edge of Bed 5 min   Standing 5 min   Short Term Goals    Short Term Goal # 1 supine to/sit EOB with mod I in 6 visits   Goal Outcome # 1 Progressing as expected   Short Term Goal # 2 sit to stand from bed/chair/toilet with SBA in 6 visits   Goal Outcome # 2 Goal met   Short Term Goal # 3 ambulation with LRD 50 feet in 6 visits   Goal Outcome # 3 Progressing as expected   Education Group   Gait Training Patient Response Patient;Acceptance;Explanation;Verbal Demonstration;Action Demonstration   Anticipated Discharge Equipment and Recommendations   DC Equipment Recommendations Unable to determine at this time   Discharge Recommendations Recommend post-acute placement for additional physical therapy services prior to discharge home       Lola Mcrae DPT

## 2022-03-11 NOTE — CARE PLAN
The patient is {Patient Stability:[id number]}    Shift Goals  Clinical Goals: monitor neuro  Patient Goals: sleep  Family Goals: N/A    Progress made toward(s) clinical / shift goals:  ***    Patient is not progressing towards the following goals:      Problem: Knowledge Deficit - Standard  Goal: Patient and family/care givers will demonstrate understanding of plan of care, disease process/condition, diagnostic tests and medications  Outcome: Not Met     Problem: Self Care  Goal: Patient will have the ability to perform ADLs independently or with assistance (bathe, groom, dress, toilet and feed)  Outcome: Not Met     Problem: Mobility  Goal: Patient's capacity to carry out activities will improve  Outcome: Not Met

## 2022-03-11 NOTE — CARE PLAN
The patient is Stable - Low risk of patient condition declining or worsening    Shift Goals  Clinical Goals: monitor neuro  Patient Goals: sleep  Family Goals: N/A    Progress made toward(s) clinical / shift goals: Pt A&O x4. Will continue to monitor neuro status.     Patient is not progressing towards the following goals: Pt not progressing in self-care and mobility goals. MD aware. PT/OT involved. Personally reached out to both therapist. Will continue to encourage Pt to participate in daily ADLs.     Problem: Self Care  Goal: Patient will have the ability to perform ADLs independently or with assistance (bathe, groom, dress, toilet and feed)  3/11/2022 1508 by Leonor Hernandez  Outcome: Not Met    Problem: Mobility  Goal: Patient's capacity to carry out activities will improve  3/11/2022 1508 by Leonor Hernandez  Outcome: Not Met

## 2022-03-11 NOTE — CARE PLAN
The patient is Stable - Low risk of patient condition declining or worsening    Shift Goals  Clinical Goals: monitor neuro  Patient Goals: sleep  Family Goals: N/A    Progress made toward(s) clinical / shift goals:  Pt had no acute events overnight, no complaints of pain and increased use of Right arm and RLE movement. Fall precautions in place, bed low and locked. Hourly rounding in place    Patient is not progressing towards the following goals:

## 2022-03-11 NOTE — CONSULTS
[facility] PSYCHIATRIC FOLLOW-UP:(established)  *Reason for admission:  LKW 1900. Patient states sudden onset right sided weakness. Patient has no effort against gravity for R arm or R leg. Facial droop noted. Patient has stuttered speech and endorses HA    *Legal Hold Status: not applicable                    S:  Definitively moving R side. Pt says he is trying hard. Talks about getting a [place] anthony and feels it is illegal for the group home not to allow a service dog. Mildly depressed and anxious over things that have happened in his past but otherwise says he is doing well, hopeful, no SI/HI, no psychosis. Future oriented    O: Medical ROS (as pertinent):                      *Psychiatric Examination:   Vitals:   Vitals:    03/10/22 1600 03/10/22 2000 03/11/22 0400 03/11/22 0751   BP: 117/70 121/76 112/71 120/72   Pulse: 64 88 64 (!) 59   Resp: 17 20 18 18   Temp: 36.2 °C (97.1 °F) 36.1 °C (97 °F) 36.3 °C (97.3 °F) 36.1 °C (97 °F)   TempSrc: Temporal Temporal Temporal Temporal   SpO2: 94% 91% 98% 94%   Weight:       Height:       General Appearance:good eye contact  Musculoskeletal: lying on bed, wiggling feet and toes of oth side  Alert/Orientation. X 4  Attn/Concentration: intact  Fund of Knowledge:not tested  Memory recent/remote: grossly intact  Speech: stutter like  Language:  fluent  Thought Content: denies psychosis/SI/HI   Thought Process:   linear     Insight/Judgement: impaired  Mood: denies depression, anxiety  Affect:smiles     Current Medications:  Scheduled Medications   Medication Dose Frequency   • latanoprost  1 Drop Q EVENING   • rivaroxaban  10 mg QDAY with Breakfast   • Pharmacy Consult Request  1 Each PHARMACY TO DOSE   • senna-docusate  2 Tablet BID   • atorvastatin  20 mg Q EVENING   • budesonide-formoterol  2 Puff BID   • divalproex  1,500 mg QHS   • insulin GLARGINE  30 Units BID   • levothyroxine  225 mcg AM ES   • lisinopril  10 mg DAILY   • lurasidone  20 mg Q  "EVENING   • montelukast  10 mg Q EVENING   • prazosin  4 mg Q EVENING   • sertraline  150 mg DAILY   • insulin regular  2-9 Units 4X/DAY ACHS          *ASSESSMENT/RECOMENDATIONS:  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 Mood disorder unspc: hx of. Stable  - to include \"lonliness and boredom\"        3. R/O intellectual limitations  -  learning disability, special education  -speech cog: 3/11: moderate problems in attn. Otherwise unremarkable but \"..Pt has assistance w/ medication management and other IADLs in his group home. No further acute SLP needs are indicated at this time, though pt will benefit from either home health or outpatient SLP tx to address his exacerbated fluency disorder.\"  -limited reliability     4. PTSD: hx of: stable     5.  Medical  -as per 2/3/2021:  \"has been seen multiple times by psychiatry. He has a documented SZ disorder, likely intellectual disability and features of a conversion disorder.\"  -HTN  -DM II   -Hx of SZ disorder:depakote     -hypothyroidism        Medical:   -as per 2/3/2021:  \"has been seen multiple times by psychiatry. He has a documented SZ disorder, likely intellectual disability and features of a conversion disorder.\"  -HTN  -DM II   -Hx of SZ disorder:depakote     -hypothyroidism  -R tingling and numbness of LE.      Legal hold: not applicable     Medication and Other Recommendations: final orders as per Tx Tm  1. No changes     Will continue to follow with you.     Discharge recommendations: as per tx tm        "

## 2022-03-11 NOTE — PROGRESS NOTES
Hospital Medicine Daily Progress Note    Date of Service  3/11/2022    Chief Complaint  Norm Prabhakar is a 41 y.o. male admitted 2/26/2022 with right arm weakness and stuttering.    Hospital Course  Norm Prabhakar is a 41 y.o. male with conversion disorder, syncope, DM2, hypothyroid, seizure, HTN, PTSD, depression and recurrent R-sided weakness and sluttering (prior normal MRIs) who presented 2/26/2022 with right-sided weakness and speech changes. Presenting  symptoms were similar to prior presentations in recent past. CT head, CT head perfusion, CTA head and neck in the ED negative for acute abnormality.  Neurology consulted in the ED, conversion disorder suspected.  Recommended admission for observation and psych consult.    Psychiatry evaluated the patient - functional neurological disorder was considered. PT/OT evaluated the patient - recommended placement to SNF. Since then, CM/SW team has been working on placement. While pending placement, Psychiatry continued to follow and PT/OT continued to work with Pt for right-sided weakness.     Interval Problem Update  3/11/2022  Stable vitals and afebrile.     At bedside, no acute complain from patient. Right AC erythema and swelling appeared reducing. Playing video game on his laptop using mainly left UE but can appreciate able to move RUE against gravity (~4/5)    I explained to him the need for continued PT/OT and be active with out of bed to chair 2-3x daily.      Declined by all local SNF's due to mental health, insurance and lack of resources  Continue rehabbing in house, strength improving  Patient is medically cleared for discharge.   CM team working on placement. Requested [facility]; denied by [facility]     I have personally seen and examined the patient at bedside. I discussed the plan of care with patient, bedside RN, charge RN and .    Consultants/Specialty  neurology, psychiatry and physiatry    Code Status  Full  Code    Disposition  Patient is medically cleared for discharge.   Anticipate discharge to group home vs SNF/IPR placement  I have placed the appropriate orders for post-discharge needs.    Review of Systems  Review of Systems   Constitutional: Negative for chills and fever.   HENT: Positive for sore throat.    Eyes: Positive for blurred vision (vision loss of R eye chronically).   Respiratory: Negative for cough and shortness of breath.    Cardiovascular: Negative for chest pain and leg swelling.   Gastrointestinal: Negative for abdominal pain, nausea and vomiting.   Genitourinary: Negative for hematuria.   Musculoskeletal: Negative for falls.   Skin: Negative for rash.   Neurological: Positive for speech change and focal weakness. Negative for sensory change and seizures.        Physical Exam  Temp:  [36.1 °C (97 °F)-36.3 °C (97.3 °F)] 36.1 °C (97 °F)  Pulse:  [59-88] 59  Resp:  [17-20] 18  BP: (112-121)/(70-76) 120/72  SpO2:  [91 %-98 %] 94 %    Physical Exam  Vitals and nursing note reviewed.   Constitutional:       General: He is not in acute distress.     Appearance: He is obese. He is not ill-appearing, toxic-appearing or diaphoretic.   HENT:      Head: Normocephalic and atraumatic.      Right Ear: External ear normal.      Left Ear: External ear normal.      Nose: Nose normal.      Mouth/Throat:      Mouth: Mucous membranes are moist.      Pharynx: Oropharynx is clear.   Eyes:      General: No scleral icterus.     Extraocular Movements: Extraocular movements intact.   Neck:      Thyroid: Thyroid mass present. No thyroid tenderness.   Cardiovascular:      Rate and Rhythm: Normal rate and regular rhythm.      Heart sounds: No murmur heard.  Pulmonary:      Effort: Pulmonary effort is normal.      Breath sounds: Normal breath sounds.   Abdominal:      General: Bowel sounds are normal.      Palpations: Abdomen is soft.      Tenderness: There is no abdominal tenderness. There is no guarding or rebound.    Musculoskeletal:         General: Swelling (Right AC with mild erythema ) present. No deformity.   Skin:     Coloration: Skin is not jaundiced.      Findings: No bruising.   Neurological:      Mental Status: He is alert and oriented to person, place, and time.      Cranial Nerves: No cranial nerve deficit.      Sensory: No sensory deficit.      Motor: Weakness present.      Comments: RUE 3-4/5 strength, RLE 3/5 strength   Psychiatric:         Mood and Affect: Mood normal.         Behavior: Behavior normal.         Fluids    Intake/Output Summary (Last 24 hours) at 3/11/2022 1040  Last data filed at 3/11/2022 0700  Gross per 24 hour   Intake --   Output 900 ml   Net -900 ml       Laboratory                        Imaging  US-EXTREMITY VENOUS UPPER UNILAT RIGHT   Final Result      DX-CHEST-PORTABLE (1 VIEW)   Final Result      No acute cardiopulmonary abnormality.      CT-CTA NECK WITH & W/O-POST PROCESSING   Final Result      1.  No occlusion or hemodynamically significant stenosis of the neck arteries.   2.  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      CT-CTA HEAD WITH & W/O-POST PROCESS   Final Result      CT angiogram of the [place] within normal limits.      CT-CEREBRAL PERFUSION ANALYSIS   Final Result      1.  Cerebral blood flow less than 30% likely representing completed infarct = 0 mL.      2.  T Max more than 6 seconds likely representing combination of completed infarct and ischemia = 0 mL.      3.  Mismatched volume likely representing ischemic brain/penumbra = None      4.  Please note that the cerebral perfusion was performed on the limited brain tissue around the basal ganglia region. Infarct/ischemia outside the CT perfusion sections can be missed in this study.      CT-HEAD W/O   Final Result      1.  Confluent cerebral white matter hypodensities again noted which may represent severe chronic leukoencephalopathy..   2.  No acute intracranial  abnormality.              Assessment/Plan  * Acute right-sided weakness- (present on admission)  Assessment & Plan  Chronic recurrent issue, per our records he has had 19 MRI head since 2011 with the majority ordered for right-sided weakness  CT scans in the ED negative for acute abnormality, chronic moderate cerebellar loss  Patient says this is similar to his previous episodes and symptoms usually resolve in a few days or less  Weakness likely due to conversion disorder  Psychiatry following  Patient refused by all SNFs, not a rehab candidate  Medically cleared for discharge to group home, SW working on group home placement    Appeared to have some improvement both RUE and RLE 3-4/5    Conversion disorder- (present on admission)  Assessment & Plan  Recurrent right-sided weakness, admitted again for this  Psych consult placed     Currently on  Depakote 1500 mg P.O at HS  Lurasidone 20mg P.O in the evening  Prazosin 4mg P.O every evening  Sertraline 150mg P.O Daily    Primary hypertension- (present on admission)  Assessment & Plan  Continue lisinopril    Mixed hyperlipidemia- (present on admission)  Assessment & Plan  Continue atorvastatin    Class 1 obesity due to excess calories with serious comorbidity and body mass index (BMI) of 32.0 to 32.9 in adult- (present on admission)  Assessment & Plan  BMI 32.7    Type 2 diabetes mellitus without complication, without long-term current use of insulin (HCC)- (present on admission)  Assessment & Plan  Hold home orals.  Continue insulin glargine  Insulin sliding scale hypoglycemia protocol in the hospital    PTSD (post-traumatic stress disorder)- (present on admission)  Assessment & Plan  Continue sertraline and prazosin    History of seizure- (present on admission)  Assessment & Plan  Continue divalproex    Anxiety and depression- (present on admission)  Assessment & Plan  Continue sertraline    Postoperative hypothyroidism- (present on admission)  Assessment &  Plan  Continue levothyroxine  Thyroid mass, h/o goiter  CT neck shows thyroid mass  TSH 8.1       Asthma- (present on admission)  Assessment & Plan  Continue montelukast and home inhaler       VTE prophylaxis: enoxaparin ppx    I have performed a physical exam and reviewed and updated ROS and Plan today (3/11/2022).

## 2022-03-11 NOTE — CARE PLAN
The patient is {Patient Stability:[id number]}    Shift Goals  Clinical Goals: monitor neuro  Patient Goals: sleep  Family Goals: N/A    Progress made toward(s) clinical / shift goals:  ***    Patient is not progressing towards the following goals:      Problem: Knowledge Deficit - Standard  Goal: Patient and family/care givers will demonstrate understanding of plan of care, disease process/condition, diagnostic tests and medications  3/11/2022 1505 by Leonor Hernandez  Outcome: Progressing  3/11/2022 1500 by Leonor Hernandez  Outcome: Not Met     Problem: Self Care  Goal: Patient will have the ability to perform ADLs independently or with assistance (bathe, groom, dress, toilet and feed)  3/11/2022 1505 by Leonor Hernandez  Outcome: Not Met  3/11/2022 1500 by Leonor Hernandez  Outcome: Not Met     Problem: Mobility  Goal: Patient's capacity to carry out activities will improve  3/11/2022 1505 by Leonor Hernandez  Outcome: Not Met  3/11/2022 1500 by Leonor Hernandez  Outcome: Not Met      8

## 2022-03-12 LAB
GLUCOSE BLD STRIP.AUTO-MCNC: 134 MG/DL (ref 65–99)
GLUCOSE BLD STRIP.AUTO-MCNC: 146 MG/DL (ref 65–99)
GLUCOSE BLD STRIP.AUTO-MCNC: 153 MG/DL (ref 65–99)
GLUCOSE BLD STRIP.AUTO-MCNC: 230 MG/DL (ref 65–99)
GLUCOSE BLD STRIP.AUTO-MCNC: 235 MG/DL (ref 65–99)

## 2022-03-12 PROCEDURE — A9270 NON-COVERED ITEM OR SERVICE: HCPCS | Performed by: STUDENT IN AN ORGANIZED HEALTH CARE EDUCATION/TRAINING PROGRAM

## 2022-03-12 PROCEDURE — 99224 PR SUBSEQUENT OBSERVATION CARE,LEVEL I: CPT | Performed by: STUDENT IN AN ORGANIZED HEALTH CARE EDUCATION/TRAINING PROGRAM

## 2022-03-12 PROCEDURE — 700102 HCHG RX REV CODE 250 W/ 637 OVERRIDE(OP): Performed by: STUDENT IN AN ORGANIZED HEALTH CARE EDUCATION/TRAINING PROGRAM

## 2022-03-12 PROCEDURE — 82962 GLUCOSE BLOOD TEST: CPT | Mod: 91

## 2022-03-12 PROCEDURE — 96372 THER/PROPH/DIAG INJ SC/IM: CPT | Mod: XU

## 2022-03-12 PROCEDURE — G0378 HOSPITAL OBSERVATION PER HR: HCPCS

## 2022-03-12 RX ADMIN — BUDESONIDE AND FORMOTEROL FUMARATE DIHYDRATE 2 PUFF: 160; 4.5 AEROSOL RESPIRATORY (INHALATION) at 18:03

## 2022-03-12 RX ADMIN — RIVAROXABAN 10 MG: 10 TABLET, FILM COATED ORAL at 09:09

## 2022-03-12 RX ADMIN — MONTELUKAST 10 MG: 10 TABLET, FILM COATED ORAL at 18:00

## 2022-03-12 RX ADMIN — SERTRALINE 150 MG: 50 TABLET, FILM COATED ORAL at 20:15

## 2022-03-12 RX ADMIN — PRAZOSIN HYDROCHLORIDE 4 MG: 2 CAPSULE ORAL at 18:00

## 2022-03-12 RX ADMIN — LEVOTHYROXINE SODIUM 225 MCG: 0.07 TABLET ORAL at 05:17

## 2022-03-12 RX ADMIN — BUDESONIDE AND FORMOTEROL FUMARATE DIHYDRATE 2 PUFF: 160; 4.5 AEROSOL RESPIRATORY (INHALATION) at 05:19

## 2022-03-12 RX ADMIN — LISINOPRIL 10 MG: 10 TABLET ORAL at 05:18

## 2022-03-12 RX ADMIN — INSULIN GLARGINE 30 UNITS: 100 INJECTION, SOLUTION SUBCUTANEOUS at 18:10

## 2022-03-12 RX ADMIN — LURASIDONE HYDROCHLORIDE 20 MG: 20 TABLET, FILM COATED ORAL at 18:00

## 2022-03-12 RX ADMIN — LATANOPROST 1 DROP: 50 SOLUTION OPHTHALMIC at 18:01

## 2022-03-12 RX ADMIN — INSULIN HUMAN 3 UNITS: 100 INJECTION, SOLUTION PARENTERAL at 20:18

## 2022-03-12 RX ADMIN — INSULIN HUMAN 2 UNITS: 100 INJECTION, SOLUTION PARENTERAL at 18:10

## 2022-03-12 RX ADMIN — ATORVASTATIN CALCIUM 20 MG: 20 TABLET, FILM COATED ORAL at 18:00

## 2022-03-12 RX ADMIN — INSULIN GLARGINE 30 UNITS: 100 INJECTION, SOLUTION SUBCUTANEOUS at 05:22

## 2022-03-12 RX ADMIN — DIVALPROEX SODIUM 1500 MG: 500 TABLET, DELAYED RELEASE ORAL at 20:17

## 2022-03-12 ASSESSMENT — ENCOUNTER SYMPTOMS
NAUSEA: 0
ABDOMINAL PAIN: 0
SHORTNESS OF BREATH: 0
FEVER: 0
CHILLS: 0
SENSORY CHANGE: 0
COUGH: 0
SORE THROAT: 1
VOMITING: 0
FOCAL WEAKNESS: 1
SPEECH CHANGE: 1
BLURRED VISION: 1
FALLS: 0
SEIZURES: 0

## 2022-03-12 ASSESSMENT — PAIN DESCRIPTION - PAIN TYPE: TYPE: ACUTE PAIN

## 2022-03-12 NOTE — PROGRESS NOTES
Hospital Medicine Daily Progress Note    Date of Service  3/12/2022    Chief Complaint  Norm Prabhakar is a 41 y.o. male admitted 2/26/2022 with right arm weakness and stuttering.    Hospital Course  Norm Prabhakar is a 41 y.o. male with conversion disorder, syncope, DM2, hypothyroid, seizure, HTN, PTSD, depression and recurrent R-sided weakness and sluttering (prior normal MRIs) who presented 2/26/2022 with right-sided weakness and speech changes. Presenting  symptoms were similar to prior presentations in recent past. CT head, CT head perfusion, CTA head and neck in the ED negative for acute abnormality.  Neurology consulted in the ED, conversion disorder suspected.  Recommended admission for observation and psych consult.    Psychiatry evaluated the patient - functional neurological disorder was considered. PT/OT evaluated the patient - recommended placement to SNF. Since then, CM/SW team has been working on placement. While pending placement, Psychiatry continued to follow and PT/OT continued to work with Pt for right-sided weakness.     Interval Problem Update  3/12/2022  Stable vitals and afebrile.     At bedside, reports doing well. In my opinion, RUE and RLE weakness continues to improve but still 3-4/5 and arm droop.     I explained to him the need for continued PT/OT and be an advocate for self to out of bed to chair 2-3x daily.      Psychiatry follow up appreciated     Declined by all local SNF's due to mental health, insurance and lack of resources  Continue rehabbing in house, strength improving  Patient is medically cleared for discharge.   CM team working on placement. Requested [facility]; denied by [facility]     I have personally seen and examined the patient at bedside. I discussed the plan of care with patient, bedside RN, charge RN and .    Consultants/Specialty  neurology, psychiatry and physiatry    Code Status  Full Code    Disposition  Patient is medically cleared for  discharge.   Anticipate discharge to group home vs SNF/IPR placement  I have placed the appropriate orders for post-discharge needs.    Review of Systems  Review of Systems   Constitutional: Negative for chills and fever.   HENT: Positive for sore throat.    Eyes: Positive for blurred vision (vision loss of R eye chronically).   Respiratory: Negative for cough and shortness of breath.    Cardiovascular: Negative for chest pain and leg swelling.   Gastrointestinal: Negative for abdominal pain, nausea and vomiting.   Genitourinary: Negative for hematuria.   Musculoskeletal: Negative for falls.   Skin: Negative for rash.   Neurological: Positive for speech change and focal weakness. Negative for sensory change and seizures.        Physical Exam  Temp:  [35.9 °C (96.6 °F)-36.8 °C (98.3 °F)] 35.9 °C (96.6 °F)  Pulse:  [63-79] 65  Resp:  [17-18] 17  BP: (103-115)/(58-75) 103/75  SpO2:  [95 %-96 %] 95 %    Physical Exam  Vitals and nursing note reviewed.   Constitutional:       General: He is not in acute distress.     Appearance: He is obese. He is not ill-appearing, toxic-appearing or diaphoretic.   HENT:      Head: Normocephalic and atraumatic.      Right Ear: External ear normal.      Left Ear: External ear normal.      Nose: Nose normal.      Mouth/Throat:      Mouth: Mucous membranes are moist.      Pharynx: Oropharynx is clear.   Eyes:      General: No scleral icterus.     Extraocular Movements: Extraocular movements intact.   Neck:      Thyroid: Thyroid mass present. No thyroid tenderness.   Cardiovascular:      Rate and Rhythm: Normal rate and regular rhythm.      Heart sounds: No murmur heard.  Pulmonary:      Effort: Pulmonary effort is normal.      Breath sounds: Normal breath sounds.   Abdominal:      General: Bowel sounds are normal.      Palpations: Abdomen is soft.      Tenderness: There is no abdominal tenderness. There is no guarding or rebound.   Musculoskeletal:         General: Swelling (Right AC with  mild erythema ) present. No deformity.   Skin:     Coloration: Skin is not jaundiced.      Findings: No bruising.   Neurological:      Mental Status: He is alert and oriented to person, place, and time.      Cranial Nerves: No cranial nerve deficit.      Sensory: No sensory deficit.      Motor: Weakness present.      Comments: RUE 3-4/5 strength, RLE 3/5 strength   Psychiatric:         Mood and Affect: Mood normal.         Behavior: Behavior normal.         Fluids    Intake/Output Summary (Last 24 hours) at 3/12/2022 1133  Last data filed at 3/12/2022 1000  Gross per 24 hour   Intake 240 ml   Output 450 ml   Net -210 ml       Laboratory                        Imaging  US-EXTREMITY VENOUS UPPER UNILAT RIGHT   Final Result      DX-CHEST-PORTABLE (1 VIEW)   Final Result      No acute cardiopulmonary abnormality.      CT-CTA NECK WITH & W/O-POST PROCESSING   Final Result      1.  No occlusion or hemodynamically significant stenosis of the neck arteries.   2.  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      CT-CTA HEAD WITH & W/O-POST PROCESS   Final Result      CT angiogram of the [place] within normal limits.      CT-CEREBRAL PERFUSION ANALYSIS   Final Result      1.  Cerebral blood flow less than 30% likely representing completed infarct = 0 mL.      2.  T Max more than 6 seconds likely representing combination of completed infarct and ischemia = 0 mL.      3.  Mismatched volume likely representing ischemic brain/penumbra = None      4.  Please note that the cerebral perfusion was performed on the limited brain tissue around the basal ganglia region. Infarct/ischemia outside the CT perfusion sections can be missed in this study.      CT-HEAD W/O   Final Result      1.  Confluent cerebral white matter hypodensities again noted which may represent severe chronic leukoencephalopathy..   2.  No acute intracranial abnormality.              Assessment/Plan  * Acute  right-sided weakness- (present on admission)  Assessment & Plan  Chronic recurrent issue, per our records he has had 19 MRI head since 2011 with the majority ordered for right-sided weakness  CT scans in the ED negative for acute abnormality, chronic moderate cerebellar loss  Patient says this is similar to his previous episodes and symptoms usually resolve in a few days or less  Weakness likely due to conversion disorder  Psychiatry following  Patient refused by all SNFs, not a rehab candidate  Medically cleared for discharge to group home, SW working on group home placement    Appeared to have some improvement both RUE and RLE 3-4/5    Conversion disorder- (present on admission)  Assessment & Plan  Recurrent right-sided weakness, admitted again for this  Psych consult placed     Currently on  Depakote 1500 mg P.O at HS  Lurasidone 20mg P.O in the evening  Prazosin 4mg P.O every evening  Sertraline 150mg P.O Daily    Primary hypertension- (present on admission)  Assessment & Plan  Continue lisinopril    Mixed hyperlipidemia- (present on admission)  Assessment & Plan  Continue atorvastatin    Class 1 obesity due to excess calories with serious comorbidity and body mass index (BMI) of 32.0 to 32.9 in adult- (present on admission)  Assessment & Plan  BMI 32.7    Type 2 diabetes mellitus without complication, without long-term current use of insulin (HCC)- (present on admission)  Assessment & Plan  Hold home orals.  Continue insulin glargine  Insulin sliding scale hypoglycemia protocol in the hospital    PTSD (post-traumatic stress disorder)- (present on admission)  Assessment & Plan  Continue sertraline and prazosin    History of seizure- (present on admission)  Assessment & Plan  Continue divalproex    Anxiety and depression- (present on admission)  Assessment & Plan  Continue sertraline    Postoperative hypothyroidism- (present on admission)  Assessment & Plan  Continue levothyroxine  Thyroid mass, h/o goiter  CT  neck shows thyroid mass  TSH 8.1       Asthma- (present on admission)  Assessment & Plan  Continue montelukast and home inhaler       VTE prophylaxis: enoxaparin ppx    I have performed a physical exam and reviewed and updated ROS and Plan today (3/12/2022).

## 2022-03-12 NOTE — ED NOTES
Pt discharged to home with discharge instructions.  Pt verbalized understanding of discharge instructions and follow up.  Steady gait, vital signs stable.  Questions with discharge answered.   
Pt to rm 26. Changed into gown, connected to monitor. Chart up for ERP.   
Oriented - self; Oriented - place; Oriented - time

## 2022-03-12 NOTE — CARE PLAN
The patient is Stable - Low risk of patient condition declining or worsening    Shift Goals  Clinical Goals: q4 neuro checks and discharge placement  Patient Goals: Rest, sleep  Family Goals: N/A    Progress made toward(s) clinical / shift goals: POC and medications discussed with pt. Diabetic education given. Pt verbalized understanding. Pt currently has right sided weakness with left side facial droop.    No reaching goals: Encourage mobility, pt refused and education given. Encouraged self care and pt is incontinent.     Problem: Knowledge Deficit - Standard  Goal: Patient and family/care givers will demonstrate understanding of plan of care, disease process/condition, diagnostic tests and medications  Outcome: Progressing     Problem: Neuro Status  Goal: Neuro status will remain stable or improve  Outcome: Progressing     Problem: Self Care  Goal: Patient will have the ability to perform ADLs independently or with assistance (bathe, groom, dress, toilet and feed)  Outcome: Progressing     Problem: Mobility  Goal: Patient's capacity to carry out activities will improve  Outcome: Progressing

## 2022-03-12 NOTE — PROGRESS NOTES
Bedside report received from nurse. Assumed care of pt. Pt resting comfortably in bed. 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  All needs met. POC reviewed and white board updated. Call light in reach. Bed locked in lowest position with 2 upper bed rails up. No pain or complaints

## 2022-03-13 LAB
GLUCOSE BLD STRIP.AUTO-MCNC: 127 MG/DL (ref 65–99)
GLUCOSE BLD STRIP.AUTO-MCNC: 145 MG/DL (ref 65–99)
GLUCOSE BLD STRIP.AUTO-MCNC: 150 MG/DL (ref 65–99)
GLUCOSE BLD STRIP.AUTO-MCNC: 162 MG/DL (ref 65–99)
GLUCOSE BLD STRIP.AUTO-MCNC: 239 MG/DL (ref 65–99)

## 2022-03-13 PROCEDURE — A9270 NON-COVERED ITEM OR SERVICE: HCPCS | Performed by: INTERNAL MEDICINE

## 2022-03-13 PROCEDURE — 99224 PR SUBSEQUENT OBSERVATION CARE,LEVEL I: CPT | Performed by: STUDENT IN AN ORGANIZED HEALTH CARE EDUCATION/TRAINING PROGRAM

## 2022-03-13 PROCEDURE — 96372 THER/PROPH/DIAG INJ SC/IM: CPT

## 2022-03-13 PROCEDURE — 700102 HCHG RX REV CODE 250 W/ 637 OVERRIDE(OP): Performed by: STUDENT IN AN ORGANIZED HEALTH CARE EDUCATION/TRAINING PROGRAM

## 2022-03-13 PROCEDURE — 94640 AIRWAY INHALATION TREATMENT: CPT

## 2022-03-13 PROCEDURE — G0378 HOSPITAL OBSERVATION PER HR: HCPCS

## 2022-03-13 PROCEDURE — 82962 GLUCOSE BLOOD TEST: CPT | Mod: 91

## 2022-03-13 PROCEDURE — 700102 HCHG RX REV CODE 250 W/ 637 OVERRIDE(OP): Performed by: INTERNAL MEDICINE

## 2022-03-13 PROCEDURE — A9270 NON-COVERED ITEM OR SERVICE: HCPCS | Performed by: STUDENT IN AN ORGANIZED HEALTH CARE EDUCATION/TRAINING PROGRAM

## 2022-03-13 RX ADMIN — INSULIN GLARGINE 30 UNITS: 100 INJECTION, SOLUTION SUBCUTANEOUS at 17:36

## 2022-03-13 RX ADMIN — LURASIDONE HYDROCHLORIDE 20 MG: 20 TABLET, FILM COATED ORAL at 17:31

## 2022-03-13 RX ADMIN — BUDESONIDE AND FORMOTEROL FUMARATE DIHYDRATE 2 PUFF: 160; 4.5 AEROSOL RESPIRATORY (INHALATION) at 05:28

## 2022-03-13 RX ADMIN — LATANOPROST 1 DROP: 50 SOLUTION OPHTHALMIC at 17:42

## 2022-03-13 RX ADMIN — BUDESONIDE AND FORMOTEROL FUMARATE DIHYDRATE 2 PUFF: 160; 4.5 AEROSOL RESPIRATORY (INHALATION) at 17:42

## 2022-03-13 RX ADMIN — RIVAROXABAN 10 MG: 10 TABLET, FILM COATED ORAL at 07:31

## 2022-03-13 RX ADMIN — SERTRALINE 150 MG: 50 TABLET, FILM COATED ORAL at 20:32

## 2022-03-13 RX ADMIN — INSULIN HUMAN 2 UNITS: 100 INJECTION, SOLUTION PARENTERAL at 17:35

## 2022-03-13 RX ADMIN — PRAZOSIN HYDROCHLORIDE 4 MG: 2 CAPSULE ORAL at 17:31

## 2022-03-13 RX ADMIN — LEVOTHYROXINE SODIUM 225 MCG: 0.07 TABLET ORAL at 05:26

## 2022-03-13 RX ADMIN — ACETAMINOPHEN 1000 MG: 500 TABLET ORAL at 20:31

## 2022-03-13 RX ADMIN — INSULIN HUMAN 3 UNITS: 100 INJECTION, SOLUTION PARENTERAL at 20:36

## 2022-03-13 RX ADMIN — ATORVASTATIN CALCIUM 20 MG: 20 TABLET, FILM COATED ORAL at 17:31

## 2022-03-13 RX ADMIN — MONTELUKAST 10 MG: 10 TABLET, FILM COATED ORAL at 17:32

## 2022-03-13 RX ADMIN — DIVALPROEX SODIUM 1500 MG: 500 TABLET, DELAYED RELEASE ORAL at 20:33

## 2022-03-13 RX ADMIN — INSULIN GLARGINE 30 UNITS: 100 INJECTION, SOLUTION SUBCUTANEOUS at 05:31

## 2022-03-13 ASSESSMENT — ENCOUNTER SYMPTOMS
FEVER: 0
VOMITING: 0
SEIZURES: 0
SPEECH CHANGE: 1
SENSORY CHANGE: 0
SORE THROAT: 1
FOCAL WEAKNESS: 1
SHORTNESS OF BREATH: 0
COUGH: 0
ABDOMINAL PAIN: 0
NAUSEA: 0
BLURRED VISION: 1
FALLS: 0
CHILLS: 0

## 2022-03-13 ASSESSMENT — PAIN DESCRIPTION - PAIN TYPE
TYPE: ACUTE PAIN
TYPE: ACUTE PAIN

## 2022-03-13 NOTE — PROGRESS NOTES
Hospital Medicine Daily Progress Note    Date of Service  3/13/2022    Chief Complaint  Norm Prabhakar is a 41 y.o. male admitted 2/26/2022 with right arm weakness and stuttering.    Hospital Course  Norm Prabhakar is a 41 y.o. male with conversion disorder, syncope, DM2, hypothyroid, seizure, HTN, PTSD, depression and recurrent R-sided weakness and sluttering (prior normal MRIs) who presented 2/26/2022 with right-sided weakness and speech changes. Presenting  symptoms were similar to prior presentations in recent past. CT head, CT head perfusion, CTA head and neck in the ED negative for acute abnormality.  Neurology consulted in the ED, conversion disorder suspected.  Recommended admission for observation and psych consult.    Psychiatry evaluated the patient - functional neurological disorder was considered. PT/OT evaluated the patient - recommended placement to SNF. Since then, CM/SW team has been working on placement. While pending placement, Psychiatry continued to follow and PT/OT continued to work with Pt for right-sided weakness.     Interval Problem Update  3/13/2022  Stable vitals and afebrile.     At bedside, no acute complain from patient. I advised out of bed to chair 2-3x daily.     Psychiatry follow up appreciated     Declined by all local SNF's due to mental health, insurance and lack of resources  Continue rehabbing in house, strength improving  Patient is medically cleared for discharge.   CM team working on placement. Requested [facility] manuel; denied by [facility]     I have personally seen and examined the patient at bedside. I discussed the plan of care with patient, bedside RN, charge RN and .    Consultants/Specialty  neurology, psychiatry and physiatry    Code Status  Full Code    Disposition  Patient is medically cleared for discharge.   Anticipate discharge to group home vs SNF/IPR placement  I have placed the appropriate orders for post-discharge needs.    Review of  Systems  Review of Systems   Constitutional: Negative for chills and fever.   HENT: Positive for sore throat.    Eyes: Positive for blurred vision (vision loss of R eye chronically).   Respiratory: Negative for cough and shortness of breath.    Cardiovascular: Negative for chest pain and leg swelling.   Gastrointestinal: Negative for abdominal pain, nausea and vomiting.   Genitourinary: Negative for hematuria.   Musculoskeletal: Negative for falls.   Skin: Negative for rash.   Neurological: Positive for speech change and focal weakness. Negative for sensory change and seizures.        Physical Exam  Temp:  [36.2 °C (97.2 °F)-36.6 °C (97.8 °F)] 36.4 °C (97.6 °F)  Pulse:  [58-82] 58  Resp:  [16-18] 16  BP: ()/(65-77) 108/68  SpO2:  [94 %-96 %] 96 %    Physical Exam  Vitals and nursing note reviewed.   Constitutional:       General: He is not in acute distress.     Appearance: He is obese. He is not ill-appearing, toxic-appearing or diaphoretic.   HENT:      Head: Normocephalic and atraumatic.      Right Ear: External ear normal.      Left Ear: External ear normal.      Nose: Nose normal.      Mouth/Throat:      Mouth: Mucous membranes are moist.      Pharynx: Oropharynx is clear.   Eyes:      General: No scleral icterus.     Extraocular Movements: Extraocular movements intact.   Neck:      Thyroid: Thyroid mass present. No thyroid tenderness.   Cardiovascular:      Rate and Rhythm: Normal rate and regular rhythm.      Heart sounds: No murmur heard.  Pulmonary:      Effort: Pulmonary effort is normal.      Breath sounds: Normal breath sounds.   Abdominal:      General: Bowel sounds are normal.      Palpations: Abdomen is soft.      Tenderness: There is no abdominal tenderness. There is no guarding or rebound.   Musculoskeletal:         General: Swelling (Right AC with mild erythema ) present. No deformity.   Skin:     Coloration: Skin is not jaundiced.      Findings: No bruising.   Neurological:      Mental  Status: He is alert and oriented to person, place, and time.      Cranial Nerves: No cranial nerve deficit.      Sensory: No sensory deficit.      Motor: Weakness present.      Comments: RUE 3-4/5 strength, RLE 3/5 strength   Psychiatric:         Mood and Affect: Mood normal.         Behavior: Behavior normal.         Fluids    Intake/Output Summary (Last 24 hours) at 3/13/2022 1202  Last data filed at 3/13/2022 0900  Gross per 24 hour   Intake 240 ml   Output 2480 ml   Net -2240 ml       Laboratory                        Imaging  US-EXTREMITY VENOUS UPPER UNILAT RIGHT   Final Result      DX-CHEST-PORTABLE (1 VIEW)   Final Result      No acute cardiopulmonary abnormality.      CT-CTA NECK WITH & W/O-POST PROCESSING   Final Result      1.  No occlusion or hemodynamically significant stenosis of the neck arteries.   2.  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      CT-CTA HEAD WITH & W/O-POST PROCESS   Final Result      CT angiogram of the [place] within normal limits.      CT-CEREBRAL PERFUSION ANALYSIS   Final Result      1.  Cerebral blood flow less than 30% likely representing completed infarct = 0 mL.      2.  T Max more than 6 seconds likely representing combination of completed infarct and ischemia = 0 mL.      3.  Mismatched volume likely representing ischemic brain/penumbra = None      4.  Please note that the cerebral perfusion was performed on the limited brain tissue around the basal ganglia region. Infarct/ischemia outside the CT perfusion sections can be missed in this study.      CT-HEAD W/O   Final Result      1.  Confluent cerebral white matter hypodensities again noted which may represent severe chronic leukoencephalopathy..   2.  No acute intracranial abnormality.              Assessment/Plan  * Acute right-sided weakness- (present on admission)  Assessment & Plan  Chronic recurrent issue, per our records he has had 19 MRI head since 2011 with  the majority ordered for right-sided weakness  CT scans in the ED negative for acute abnormality, chronic moderate cerebellar loss  Patient says this is similar to his previous episodes and symptoms usually resolve in a few days or less  Weakness likely due to conversion disorder  Psychiatry following  Patient refused by all SNFs, not a rehab candidate  Medically cleared for discharge to group home, SW working on group home placement    Appeared to have some improvement both RUE and RLE 3-4/5    Conversion disorder- (present on admission)  Assessment & Plan  Recurrent right-sided weakness, admitted again for this  Psych consult placed     Currently on  Depakote 1500 mg P.O at HS  Lurasidone 20mg P.O in the evening  Prazosin 4mg P.O every evening  Sertraline 150mg P.O Daily    Primary hypertension- (present on admission)  Assessment & Plan  Continue lisinopril    Mixed hyperlipidemia- (present on admission)  Assessment & Plan  Continue atorvastatin    Class 1 obesity due to excess calories with serious comorbidity and body mass index (BMI) of 32.0 to 32.9 in adult- (present on admission)  Assessment & Plan  BMI 32.7    Type 2 diabetes mellitus without complication, without long-term current use of insulin (HCC)- (present on admission)  Assessment & Plan  Hold home orals.  Continue insulin glargine  Insulin sliding scale hypoglycemia protocol in the hospital    PTSD (post-traumatic stress disorder)- (present on admission)  Assessment & Plan  Continue sertraline and prazosin    History of seizure- (present on admission)  Assessment & Plan  Continue divalproex    Anxiety and depression- (present on admission)  Assessment & Plan  Continue sertraline    Postoperative hypothyroidism- (present on admission)  Assessment & Plan  Continue levothyroxine  Thyroid mass, h/o goiter  CT neck shows thyroid mass  TSH 8.1       Asthma- (present on admission)  Assessment & Plan  Continue montelukast and home inhaler       VTE  prophylaxis: enoxaparin ppx    I have performed a physical exam and reviewed and updated ROS and Plan today (3/13/2022).

## 2022-03-13 NOTE — CARE PLAN
The patient is Stable - Low risk of patient condition declining or worsening    Shift Goals  Clinical Goals: Maintain neuro status  Patient Goals: Walk  Family Goals: N/A    Progress made toward(s) clinical / shift goals:        Problem: Knowledge Deficit - Standard  Goal: Patient and family/care givers will demonstrate understanding of plan of care, disease process/condition, diagnostic tests and medications  Outcome: Progressing     Problem: Skin Integrity  Goal: Skin integrity is maintained or improved  Outcome: Progressing     Problem: Pain - Standard  Goal: Alleviation of pain or a reduction in pain to the patient’s comfort goal  Outcome: Progressing     Problem: Neuro Status  Goal: Neuro status will remain stable or improve  Outcome: Progressing     Problem: Self Care  Goal: Patient will have the ability to perform ADLs independently or with assistance (bathe, groom, dress, toilet and feed)  Outcome: Progressing     Problem: Risk for Aspiration  Goal: Patient's risk for aspiration will be absent or decrease  Outcome: Progressing     Problem: Urinary Elimination  Goal: Establish and maintain regular urinary output  Outcome: Progressing     Problem: Bowel Elimination  Goal: Establish and maintain regular bowel function  Outcome: Progressing     Problem: Respiratory  Goal: Patient will achieve/maintain optimum respiratory ventilation and gas exchange  Outcome: Progressing     Problem: Mobility  Goal: Patient's capacity to carry out activities will improve  Outcome: Progressing

## 2022-03-13 NOTE — CARE PLAN
The patient is Stable - Low risk of patient condition declining or worsening    Shift Goals  Clinical Goals: Monitor neuro status  Patient Goals: Sleep  Family Goals: JUANCARLOS    Progress made toward(s) clinical / shift goals:    Problem: Knowledge Deficit - Standard  Goal: Patient and family/care givers will demonstrate understanding of plan of care, disease process/condition, diagnostic tests and medications  Outcome: Progressing   The patient received education reinforcement regarding plan of care, condition, and medications.      Problem: Pain - Standard  Goal: Alleviation of pain or a reduction in pain to the patient’s comfort goal  Outcome: Progressing   Patient verbalizes pain level and comfort goals. No pain reported at this time.      Problem: Neuro Status  Goal: Neuro status will remain stable or improve  Outcome: Progressing   Q4hr neuro checks. Neuro status remains stable.      Problem: Mobility  Goal: Patient's capacity to carry out activities will improve  Outcome: Progressing   Patient's right side is still weaker than left, but he is able to carry out most activity in bed including urinal use and eating.     Patient is not progressing towards the following goals:

## 2022-03-14 LAB
GLUCOSE BLD STRIP.AUTO-MCNC: 170 MG/DL (ref 65–99)
GLUCOSE BLD STRIP.AUTO-MCNC: 220 MG/DL (ref 65–99)
GLUCOSE BLD STRIP.AUTO-MCNC: 223 MG/DL (ref 65–99)
GLUCOSE BLD STRIP.AUTO-MCNC: 229 MG/DL (ref 65–99)
GLUCOSE BLD STRIP.AUTO-MCNC: 237 MG/DL (ref 65–99)

## 2022-03-14 PROCEDURE — A9270 NON-COVERED ITEM OR SERVICE: HCPCS | Performed by: STUDENT IN AN ORGANIZED HEALTH CARE EDUCATION/TRAINING PROGRAM

## 2022-03-14 PROCEDURE — G0378 HOSPITAL OBSERVATION PER HR: HCPCS

## 2022-03-14 PROCEDURE — 700102 HCHG RX REV CODE 250 W/ 637 OVERRIDE(OP): Performed by: STUDENT IN AN ORGANIZED HEALTH CARE EDUCATION/TRAINING PROGRAM

## 2022-03-14 PROCEDURE — 82962 GLUCOSE BLOOD TEST: CPT

## 2022-03-14 PROCEDURE — 99224 PR SUBSEQUENT OBSERVATION CARE,LEVEL I: CPT | Performed by: STUDENT IN AN ORGANIZED HEALTH CARE EDUCATION/TRAINING PROGRAM

## 2022-03-14 PROCEDURE — 96372 THER/PROPH/DIAG INJ SC/IM: CPT | Mod: XU

## 2022-03-14 RX ADMIN — LURASIDONE HYDROCHLORIDE 20 MG: 20 TABLET, FILM COATED ORAL at 17:34

## 2022-03-14 RX ADMIN — DIVALPROEX SODIUM 1500 MG: 500 TABLET, DELAYED RELEASE ORAL at 20:54

## 2022-03-14 RX ADMIN — BUDESONIDE AND FORMOTEROL FUMARATE DIHYDRATE 2 PUFF: 160; 4.5 AEROSOL RESPIRATORY (INHALATION) at 17:34

## 2022-03-14 RX ADMIN — ATORVASTATIN CALCIUM 20 MG: 20 TABLET, FILM COATED ORAL at 17:33

## 2022-03-14 RX ADMIN — MONTELUKAST 10 MG: 10 TABLET, FILM COATED ORAL at 17:34

## 2022-03-14 RX ADMIN — LATANOPROST 1 DROP: 50 SOLUTION OPHTHALMIC at 17:34

## 2022-03-14 RX ADMIN — RIVAROXABAN 10 MG: 10 TABLET, FILM COATED ORAL at 08:08

## 2022-03-14 RX ADMIN — LISINOPRIL 10 MG: 10 TABLET ORAL at 06:17

## 2022-03-14 RX ADMIN — PRAZOSIN HYDROCHLORIDE 4 MG: 2 CAPSULE ORAL at 17:33

## 2022-03-14 RX ADMIN — INSULIN GLARGINE 30 UNITS: 100 INJECTION, SOLUTION SUBCUTANEOUS at 06:20

## 2022-03-14 RX ADMIN — SERTRALINE 150 MG: 50 TABLET, FILM COATED ORAL at 20:53

## 2022-03-14 RX ADMIN — INSULIN HUMAN 3 UNITS: 100 INJECTION, SOLUTION PARENTERAL at 11:50

## 2022-03-14 RX ADMIN — INSULIN GLARGINE 30 UNITS: 100 INJECTION, SOLUTION SUBCUTANEOUS at 17:30

## 2022-03-14 RX ADMIN — INSULIN HUMAN 2 UNITS: 100 INJECTION, SOLUTION PARENTERAL at 17:30

## 2022-03-14 RX ADMIN — CYCLOBENZAPRINE 5 MG: 10 TABLET, FILM COATED ORAL at 17:40

## 2022-03-14 RX ADMIN — INSULIN HUMAN 3 UNITS: 100 INJECTION, SOLUTION PARENTERAL at 20:59

## 2022-03-14 RX ADMIN — INSULIN HUMAN 5 UNITS: 100 INJECTION, SOLUTION PARENTERAL at 08:12

## 2022-03-14 RX ADMIN — LEVOTHYROXINE SODIUM 225 MCG: 0.07 TABLET ORAL at 06:14

## 2022-03-14 RX ADMIN — BUDESONIDE AND FORMOTEROL FUMARATE DIHYDRATE 2 PUFF: 160; 4.5 AEROSOL RESPIRATORY (INHALATION) at 06:17

## 2022-03-14 ASSESSMENT — ENCOUNTER SYMPTOMS
BLURRED VISION: 1
SEIZURES: 0
NAUSEA: 0
ABDOMINAL PAIN: 0
FOCAL WEAKNESS: 1
COUGH: 0
SENSORY CHANGE: 0
SORE THROAT: 1
VOMITING: 0
CHILLS: 0
FEVER: 0
FALLS: 0
SHORTNESS OF BREATH: 0
SPEECH CHANGE: 1

## 2022-03-14 ASSESSMENT — PAIN DESCRIPTION - PAIN TYPE
TYPE: ACUTE PAIN
TYPE: ACUTE PAIN;REFERRED PAIN
TYPE: ACUTE PAIN

## 2022-03-14 NOTE — CARE PLAN
The patient is Stable - Low risk of patient condition declining or worsening    Shift Goals  Clinical Goals: Assess neuromuscular function  Patient Goals: Sleep  Family Goals: JUANCARLOS    Progress made toward(s) clinical / shift goals:    Problem: Knowledge Deficit - Standard  Goal: Patient and family/care givers will demonstrate understanding of plan of care, disease process/condition, diagnostic tests and medications  Outcome: Progressing     Problem: Neuro Status  Goal: Neuro status will remain stable or improve  Outcome: Progressing     Problem: Pain - Standard  Goal: Alleviation of pain or a reduction in pain to the patient’s comfort goal  Outcome: Progressing     Problem: Bowel Elimination  Goal: Establish and maintain regular bowel function  Outcome: Progressing       Patient is not progressing towards the following goals:

## 2022-03-14 NOTE — THERAPY
Missed Therapy     Patient Name: Norm Prabhakar  Age:  41 y.o., Sex:  male  Medical Record #: [id number]  Today's Date: 3/14/2022    Attempted to see patient for OT tx session, pt tired from lunch and just getting back to bed requesting to be seen 3/15. Will follow up as appropriate.       Ehsan Sloan OTD, OTR/L

## 2022-03-14 NOTE — PROGRESS NOTES
[facility] Medicine Daily Progress Note    Date of Service  3/14/2022    Chief Complaint  Norm Prabhakar is a 41 y.o. male admitted 2/26/2022 with right arm weakness and stuttering.    Hospital Course  Norm Prabhakar is a 41 y.o. male with conversion disorder, syncope, DM2, hypothyroid, seizure, HTN, PTSD, depression and recurrent R-sided weakness and sluttering (prior normal MRIs) who presented 2/26/2022 with right-sided weakness and speech changes. Presenting  symptoms were similar to prior presentations in recent past. CT head, CT head perfusion, CTA head and neck in the ED negative for acute abnormality.  Neurology consulted in the ED, conversion disorder suspected.  Recommended admission for observation and psych consult.    Psychiatry evaluated the patient - functional neurological disorder was considered. PT/OT evaluated the patient - recommended placement to SNF. Since then, CM/SW team has been working on placement. While pending placement, Psychiatry continued to follow and PT/OT continued to work with Pt for right-sided weakness.     Interval Problem Update  3/14/2022  Stable vitals and afebrile.     At bedside, no acute complain from patient. He is browsing Internet to buy a car. I explained to him current challenge and him needing to be independent at group home.      I advised out of bed to chair 2-3x daily and advocate self to work with nursing or PT/OT to mobilize more.     Psychiatry follow up appreciated - Psychotherapy requested.     Declined by all local SNF's due to mental health, insurance and lack of resources  Continue rehabbing in house, strength improving  Patient is medically cleared for discharge.   CM team working on placement. Requested [facility] and [facility] - declined by both  Pt needs to be independent before returning to group home.     I have personally seen and examined the patient at bedside. I discussed the plan of care with patient, bedside RN, charge RN and case  manager.    Consultants/Specialty  neurology, psychiatry and physiatry    Code Status  Full Code    Disposition  Patient is medically cleared for discharge.   Anticipate discharge to group home vs SNF/IPR placement  I have placed the appropriate orders for post-discharge needs.    Review of Systems  Review of Systems   Constitutional: Negative for chills and fever.   HENT: Positive for sore throat.    Eyes: Positive for blurred vision (vision loss of R eye chronically).   Respiratory: Negative for cough and shortness of breath.    Cardiovascular: Negative for chest pain and leg swelling.   Gastrointestinal: Negative for abdominal pain, nausea and vomiting.   Genitourinary: Negative for hematuria.   Musculoskeletal: Negative for falls.   Skin: Negative for rash.   Neurological: Positive for speech change and focal weakness. Negative for sensory change and seizures.        Physical Exam  Temp:  [36.2 °C (97.2 °F)-37 °C (98.6 °F)] 36.3 °C (97.3 °F)  Pulse:  [61-71] 64  Resp:  [18] 18  BP: (118-125)/(69-78) 118/73  SpO2:  [93 %-97 %] 94 %    Physical Exam  Vitals and nursing note reviewed.   Constitutional:       General: He is not in acute distress.     Appearance: He is obese. He is not ill-appearing, toxic-appearing or diaphoretic.   HENT:      Head: Normocephalic and atraumatic.      Right Ear: External ear normal.      Left Ear: External ear normal.      Nose: Nose normal.      Mouth/Throat:      Mouth: Mucous membranes are moist.      Pharynx: Oropharynx is clear.   Eyes:      General: No scleral icterus.     Extraocular Movements: Extraocular movements intact.   Neck:      Thyroid: Thyroid mass present. No thyroid tenderness.   Cardiovascular:      Rate and Rhythm: Normal rate and regular rhythm.      Heart sounds: No murmur heard.  Pulmonary:      Effort: Pulmonary effort is normal.      Breath sounds: Normal breath sounds.   Abdominal:      General: Bowel sounds are normal.      Palpations: Abdomen is soft.       Tenderness: There is no abdominal tenderness. There is no guarding or rebound.   Musculoskeletal:         General: Swelling (Right AC with mild erythema ) present. No deformity.   Skin:     Coloration: Skin is not jaundiced.      Findings: No bruising.   Neurological:      Mental Status: He is alert and oriented to person, place, and time.      Cranial Nerves: No cranial nerve deficit.      Sensory: No sensory deficit.      Motor: Weakness present.      Comments: RUE 3-4/5 strength, RLE 3/5 strength   Psychiatric:         Mood and Affect: Mood normal.         Behavior: Behavior normal.         Fluids    Intake/Output Summary (Last 24 hours) at 3/14/2022 1238  Last data filed at 3/14/2022 0600  Gross per 24 hour   Intake --   Output 900 ml   Net -900 ml       Laboratory                        Imaging  US-EXTREMITY VENOUS UPPER UNILAT RIGHT   Final Result      DX-CHEST-PORTABLE (1 VIEW)   Final Result      No acute cardiopulmonary abnormality.      CT-CTA NECK WITH & W/O-POST PROCESSING   Final Result      1.  No occlusion or hemodynamically significant stenosis of the neck arteries.   2.  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      CT-CTA HEAD WITH & W/O-POST PROCESS   Final Result      CT angiogram of the [place] within normal limits.      CT-CEREBRAL PERFUSION ANALYSIS   Final Result      1.  Cerebral blood flow less than 30% likely representing completed infarct = 0 mL.      2.  T Max more than 6 seconds likely representing combination of completed infarct and ischemia = 0 mL.      3.  Mismatched volume likely representing ischemic brain/penumbra = None      4.  Please note that the cerebral perfusion was performed on the limited brain tissue around the basal ganglia region. Infarct/ischemia outside the CT perfusion sections can be missed in this study.      CT-HEAD W/O   Final Result      1.  Confluent cerebral white matter hypodensities again noted which  may represent severe chronic leukoencephalopathy..   2.  No acute intracranial abnormality.              Assessment/Plan  * Acute right-sided weakness- (present on admission)  Assessment & Plan  Chronic recurrent issue, per our records he has had 19 MRI head since 2011 with the majority ordered for right-sided weakness  CT scans in the ED negative for acute abnormality, chronic moderate cerebellar loss  Patient says this is similar to his previous episodes and symptoms usually resolve in a few days or less  Weakness likely due to conversion disorder  Psychiatry following  Patient refused by all SNFs, not a rehab candidate  Medically cleared for discharge to group home, SW working on group home placement    Appeared to have some improvement both RUE and RLE 3-4/5    Conversion disorder- (present on admission)  Assessment & Plan  Recurrent right-sided weakness, admitted again for this  Psych consult placed     Currently on  Depakote 1500 mg P.O at HS  Lurasidone 20mg P.O in the evening  Prazosin 4mg P.O every evening  Sertraline 150mg P.O Daily    Primary hypertension- (present on admission)  Assessment & Plan  Continue lisinopril    Mixed hyperlipidemia- (present on admission)  Assessment & Plan  Continue atorvastatin    Class 1 obesity due to excess calories with serious comorbidity and body mass index (BMI) of 32.0 to 32.9 in adult- (present on admission)  Assessment & Plan  BMI 32.7    Type 2 diabetes mellitus without complication, without long-term current use of insulin (HCC)- (present on admission)  Assessment & Plan  Hold home orals.  Continue insulin glargine  Insulin sliding scale hypoglycemia protocol in the hospital    PTSD (post-traumatic stress disorder)- (present on admission)  Assessment & Plan  Continue sertraline and prazosin    History of seizure- (present on admission)  Assessment & Plan  Continue divalproex    Anxiety and depression- (present on admission)  Assessment & Plan  Continue  sertraline    Postoperative hypothyroidism- (present on admission)  Assessment & Plan  Continue levothyroxine  Thyroid mass, h/o goiter  CT neck shows thyroid mass  TSH 8.1       Asthma- (present on admission)  Assessment & Plan  Continue montelukast and home inhaler       VTE prophylaxis: enoxaparin ppx    I have performed a physical exam and reviewed and updated ROS and Plan today (3/14/2022).

## 2022-03-14 NOTE — CONSULTS
"  [facility] PSYCHIATRIC FOLLOW-UP:(established)  *Reason for admission:  LKW 1900. Patient states sudden onset right sided weakness. Patient has no effort against gravity for R arm or R leg. Facial droop noted. Patient has stuttered speech and endorses HA    *Legal Hold Status: not applicable                S:  Says he is improving but not there yet. Didn't sleep that well because \"they keep waking me up\" but not upset about it. Continues to deny depression, anxiety, SI/HI, psychosis, complaints. Has the [place] Indiferance.    O: Medical ROS (as pertinent):                      *Psychiatric Examination:   Vitals:   Vitals:    03/13/22 1551 03/13/22 2000 03/14/22 0600 03/14/22 0714   BP: 124/78 118/73 125/69 118/73   Pulse: 61 71 63 64   Resp: 18 18 18 18   Temp: 36.6 °C (97.9 °F) 36.2 °C (97.2 °F) 37 °C (98.6 °F) 36.3 °C (97.3 °F)   TempSrc: Temporal Temporal Temporal Temporal   SpO2: 93% 96% 97% 94%   Weight:       Height:         General Appearance:good eye contact  Musculoskeletal: lying on bed,   Alert/Orientation. X 4  Attn/Concentration: intact  Fund of Knowledge:not tested  Memory recent/remote: grossly intact  Speech: stutter like  Language:  fluent  Thought Content: denies psychosis/SI/HI   Thought Process:   linear     Insight/Judgement: impaired  Mood: denies depression, anxiety  Affect: blunted though smiles as well: because of his facial asymmetries, sometimes it is hard to see that he is smiling     Current Medications:  Scheduled Medications   Medication Dose Frequency   • latanoprost  1 Drop Q EVENING   • rivaroxaban  10 mg QDAY with Breakfast   • Pharmacy Consult Request  1 Each PHARMACY TO DOSE   • senna-docusate  2 Tablet BID   • atorvastatin  20 mg Q EVENING   • budesonide-formoterol  2 Puff BID   • divalproex  1,500 mg QHS   • insulin GLARGINE  30 Units BID   • levothyroxine  225 mcg AM ES   • lisinopril  10 mg DAILY   • lurasidone  20 mg Q EVENING   • montelukast  10 mg Q " "EVENING   • prazosin  4 mg Q EVENING   • sertraline  150 mg DAILY   • insulin regular  2-9 Units 4X/DAY ACHS          *ASSESSMENT/RECOMENDATIONS:   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 Mood disorder unspc: hx of. Stable  - to include \"lonliness and boredom\"        3. R/O intellectual limitations  -  learning disability, special education  -speech cog: 3/11: moderate problems in attn. Otherwise unremarkable but \"..Pt has assistance w/ medication management and other IADLs in his group home. No further acute SLP needs are indicated at this time, though pt will benefit from either home health or outpatient SLP tx to address his exacerbated fluency disorder.\"  -limited reliability     4. PTSD: hx of: stable     5.  Medical  -as per 2/3/2021:  \"has been seen multiple times by psychiatry. He has a documented SZ disorder, likely intellectual disability and features of a conversion disorder.\"  -HTN  -DM II   -Hx of SZ disorder:depakote     -hypothyroidism        Medical:   -as per 2/3/2021:  \"has been seen multiple times by psychiatry. He has a documented SZ disorder, likely intellectual disability and features of a conversion disorder.\"  -HTN  -DM II   -Hx of SZ disorder:depakote     -hypothyroidism  -R tingling and numbness of LE.      Legal hold: not applicable    Medication and Other Recommendations: final orders as per Tx Tm  1. No changes    Will continue to follow with you.      Discharge recommendations: per tx tm         "

## 2022-03-14 NOTE — DISCHARGE PLANNING
Anticipated Discharge Disposition:   TBD    Action:    RN BOUCHRA left  for outpatient [facility], , Sarah [phone] with request to return call.    Pt was seen by PT on 3-11-22.  He was standby assist and able to walk 20 feet with FWW.    Request to DPA to please resend referral to [facility].    Barriers to Discharge:    Placement acceptance    Plan:    F/U with [facility].  F/U with [facility], .

## 2022-03-15 LAB
GLUCOSE BLD STRIP.AUTO-MCNC: 171 MG/DL (ref 65–99)
GLUCOSE BLD STRIP.AUTO-MCNC: 215 MG/DL (ref 65–99)
GLUCOSE BLD STRIP.AUTO-MCNC: 243 MG/DL (ref 65–99)
GLUCOSE BLD STRIP.AUTO-MCNC: 279 MG/DL (ref 65–99)
GLUCOSE BLD STRIP.AUTO-MCNC: 287 MG/DL (ref 65–99)

## 2022-03-15 PROCEDURE — 97530 THERAPEUTIC ACTIVITIES: CPT

## 2022-03-15 PROCEDURE — 700102 HCHG RX REV CODE 250 W/ 637 OVERRIDE(OP): Performed by: STUDENT IN AN ORGANIZED HEALTH CARE EDUCATION/TRAINING PROGRAM

## 2022-03-15 PROCEDURE — 96372 THER/PROPH/DIAG INJ SC/IM: CPT | Mod: XU

## 2022-03-15 PROCEDURE — G0378 HOSPITAL OBSERVATION PER HR: HCPCS

## 2022-03-15 PROCEDURE — A9270 NON-COVERED ITEM OR SERVICE: HCPCS | Performed by: STUDENT IN AN ORGANIZED HEALTH CARE EDUCATION/TRAINING PROGRAM

## 2022-03-15 PROCEDURE — 97535 SELF CARE MNGMENT TRAINING: CPT

## 2022-03-15 PROCEDURE — 97116 GAIT TRAINING THERAPY: CPT

## 2022-03-15 PROCEDURE — 99224 PR SUBSEQUENT OBSERVATION CARE,LEVEL I: CPT | Performed by: STUDENT IN AN ORGANIZED HEALTH CARE EDUCATION/TRAINING PROGRAM

## 2022-03-15 PROCEDURE — 82962 GLUCOSE BLOOD TEST: CPT

## 2022-03-15 RX ADMIN — INSULIN HUMAN 5 UNITS: 100 INJECTION, SOLUTION PARENTERAL at 21:30

## 2022-03-15 RX ADMIN — BUDESONIDE AND FORMOTEROL FUMARATE DIHYDRATE 2 PUFF: 160; 4.5 AEROSOL RESPIRATORY (INHALATION) at 05:48

## 2022-03-15 RX ADMIN — MONTELUKAST 10 MG: 10 TABLET, FILM COATED ORAL at 18:11

## 2022-03-15 RX ADMIN — INSULIN GLARGINE 30 UNITS: 100 INJECTION, SOLUTION SUBCUTANEOUS at 18:12

## 2022-03-15 RX ADMIN — SENNOSIDES AND DOCUSATE SODIUM 2 TABLET: 50; 8.6 TABLET ORAL at 18:11

## 2022-03-15 RX ADMIN — DIVALPROEX SODIUM 1500 MG: 500 TABLET, DELAYED RELEASE ORAL at 21:21

## 2022-03-15 RX ADMIN — LATANOPROST 1 DROP: 50 SOLUTION OPHTHALMIC at 18:10

## 2022-03-15 RX ADMIN — BUDESONIDE AND FORMOTEROL FUMARATE DIHYDRATE 2 PUFF: 160; 4.5 AEROSOL RESPIRATORY (INHALATION) at 18:10

## 2022-03-15 RX ADMIN — ATORVASTATIN CALCIUM 20 MG: 20 TABLET, FILM COATED ORAL at 18:11

## 2022-03-15 RX ADMIN — LEVOTHYROXINE SODIUM 225 MCG: 0.07 TABLET ORAL at 05:40

## 2022-03-15 RX ADMIN — LISINOPRIL 10 MG: 10 TABLET ORAL at 05:42

## 2022-03-15 RX ADMIN — INSULIN HUMAN 3 UNITS: 100 INJECTION, SOLUTION PARENTERAL at 11:41

## 2022-03-15 RX ADMIN — INSULIN HUMAN 2 UNITS: 100 INJECTION, SOLUTION PARENTERAL at 07:53

## 2022-03-15 RX ADMIN — PRAZOSIN HYDROCHLORIDE 4 MG: 2 CAPSULE ORAL at 18:11

## 2022-03-15 RX ADMIN — RIVAROXABAN 10 MG: 10 TABLET, FILM COATED ORAL at 07:48

## 2022-03-15 RX ADMIN — SERTRALINE 150 MG: 50 TABLET, FILM COATED ORAL at 21:21

## 2022-03-15 RX ADMIN — INSULIN HUMAN 5 UNITS: 100 INJECTION, SOLUTION PARENTERAL at 16:41

## 2022-03-15 RX ADMIN — LURASIDONE HYDROCHLORIDE 20 MG: 20 TABLET, FILM COATED ORAL at 18:11

## 2022-03-15 RX ADMIN — INSULIN GLARGINE 30 UNITS: 100 INJECTION, SOLUTION SUBCUTANEOUS at 05:44

## 2022-03-15 ASSESSMENT — PAIN DESCRIPTION - PAIN TYPE
TYPE: ACUTE PAIN

## 2022-03-15 ASSESSMENT — COGNITIVE AND FUNCTIONAL STATUS - GENERAL
MOVING FROM LYING ON BACK TO SITTING ON SIDE OF FLAT BED: A LITTLE
DRESSING REGULAR LOWER BODY CLOTHING: A LITTLE
SUGGESTED CMS G CODE MODIFIER MOBILITY: CK
STANDING UP FROM CHAIR USING ARMS: A LITTLE
DRESSING REGULAR UPPER BODY CLOTHING: A LITTLE
TOILETING: A LITTLE
TURNING FROM BACK TO SIDE WHILE IN FLAT BAD: A LITTLE
SUGGESTED CMS G CODE MODIFIER DAILY ACTIVITY: CK
MOVING TO AND FROM BED TO CHAIR: A LITTLE
DAILY ACTIVITIY SCORE: 19
HELP NEEDED FOR BATHING: A LITTLE
CLIMB 3 TO 5 STEPS WITH RAILING: TOTAL
PERSONAL GROOMING: A LITTLE
WALKING IN HOSPITAL ROOM: A LITTLE
MOBILITY SCORE: 16

## 2022-03-15 ASSESSMENT — ENCOUNTER SYMPTOMS
EYES NEGATIVE: 1
VOMITING: 0
HEADACHES: 0
SHORTNESS OF BREATH: 0
COUGH: 1
MYALGIAS: 0
NAUSEA: 0
CHILLS: 0
ABDOMINAL PAIN: 0
FEVER: 0

## 2022-03-15 ASSESSMENT — GAIT ASSESSMENTS
DISTANCE (FEET): 35
ASSISTIVE DEVICE: FRONT WHEEL WALKER
GAIT LEVEL OF ASSIST: CONTACT GUARD ASSIST

## 2022-03-15 NOTE — THERAPY
"Physical Therapy   Daily Treatment     Patient Name: Norm Prabhakar  Age:  41 y.o., Sex:  male  Medical Record #: [id number]  Today's Date: 3/15/2022     Precautions  Precautions: Fall Risk    Assessment    Pt tolerates treatment session well; no pain reported, no LOB or SOB. Pt demonstrates improving R LE strength, improved ambulation distance to 35 feet with FWW, sit to stand transfers with SBA. Pt education regarding plan of focusing on stairs at next session, pt agreeable.       Plan    Continue current treatment plan.    DC Equipment Recommendations: (P) Unable to determine at this time  Discharge Recommendations: (P) Recommend post-acute placement for additional physical therapy services prior to discharge home      Subjective     \"My legs are feeling shaky\"       03/15/22 0910   Precautions   Precautions Fall Risk   Pain 0 - 10 Group   Therapist Pain Assessment 0;Post Activity Pain Same as Prior to Activity   Cognition    Cognition / Consciousness X   Level of Consciousness Alert   Ability To Follow Commands 2 Step   Attention Impaired   Sequencing Impaired   Strength Lower Body   Lower Body Strength  X   Comments R LE strength improving, B LE fatigue with ambulation greater than 30 feet   Balance   Sitting Balance (Static) Fair +   Sitting Balance (Dynamic) Fair +   Standing Balance (Static) Fair   Standing Balance (Dynamic) Fair -   Weight Shift Sitting Fair   Weight Shift Standing Fair   Skilled Intervention Verbal Cuing   Comments with FWW   Gait Analysis   Gait Level Of Assist Contact Guard Assist   Assistive Device Front Wheel Walker   Distance (Feet) 35   # of Times Distance was Traveled 1   Comments pt reports fatigue in B LE after 20 feet   Bed Mobility    Supine to Sit Supervised   Scooting Supervised   Skilled Intervention Verbal Cuing   Functional Mobility   Sit to Stand Standby Assist   Bed, Chair, Wheelchair Transfer Standby Assist   Skilled Intervention Verbal Cuing   How much difficulty does " the patient currently have...   Turning over in bed (including adjusting bedclothes, sheets and blankets)? 3   Sitting down on and standing up from a chair with arms (e.g., wheelchair, bedside commode, etc.) 3   Moving from lying on back to sitting on the side of the bed? 3   How much help from another person does the patient currently need...   Moving to and from a bed to a chair (including a wheelchair)? 3   Need to walk in a hospital room? 3   Climbing 3-5 steps with a railing? 1   6 clicks Mobility Score 16   Activity Tolerance   Sitting in Chair left seated in chair   Sitting Edge of Bed 5 min   Standing 5 min   Short Term Goals    Short Term Goal # 1 supine to/sit EOB with mod I in 6 visits   Goal Outcome # 1 Progressing as expected   Short Term Goal # 2 sit to stand from bed/chair/toilet with SBA in 6 visits   Goal Outcome # 2 Goal met   Short Term Goal # 3 ambulation with LRD 50 feet in 6 visits   Goal Outcome # 3 Progressing as expected   Education Group   Gait Training Patient Response Patient;Acceptance;Explanation;Verbal Demonstration;Action Demonstration   Transfer Status Patient Response Patient;Acceptance;Explanation;Verbal Demonstration;Action Demonstration   Anticipated Discharge Equipment and Recommendations   DC Equipment Recommendations Unable to determine at this time   Discharge Recommendations Recommend post-acute placement for additional physical therapy services prior to discharge home       Lola Mcrae DPT

## 2022-03-15 NOTE — THERAPY
Occupational Therapy  Daily Treatment     Patient Name: Norm Prabhakar  Age:  41 y.o., Sex:  male  Medical Record #: [id number]  Today's Date: 3/15/2022     Precautions  Precautions: (P) Fall Risk    Assessment    Pt seen for follow up OT tx session, pt able to complete LB ADLs and STS with supervision, pts RUE strength improving to ~3/5 strength able to use for dressing and self feeding without encouragement, pt likely progressing towards functional baseline. Encouraged patient to ambulate to restroom with nursing staff and get up to chair as often as possible to maximize strength/functional independence while admitted.       Plan    Continue current treatment plan.    DC Equipment Recommendations: (P) Unable to determine at this time  Discharge Recommendations: (P) Recommend post-acute placement for additional occupational therapy services prior to discharge home    Objective       03/15/22 0925   Precautions   Precautions Fall Risk   Strength Upper Body   Upper Body Strength  X   Comments RUE improving to 3/5   Sensation Upper Body   Upper Extremity Sensation  WDL   Other Treatments   Other Treatments Provided Pt able to self feed and donned pants with RUE assisting throughout without encouragement   Balance   Sitting Balance (Static) Fair +   Sitting Balance (Dynamic) Fair +   Standing Balance (Static) Fair   Standing Balance (Dynamic) Fair -   Weight Shift Sitting Fair   Weight Shift Standing Fair   Skilled Intervention Verbal Cuing   Comments w/ FWW   Bed Mobility    Supine to Sit Supervised   Scooting Supervised   Skilled Intervention Verbal Cuing   Activities of Daily Living   Eating Supervision   Grooming Supervision;Seated   Upper Body Dressing Supervision   Lower Body Dressing Supervision   Skilled Intervention Verbal Cuing   How much help from another person does the patient currently need...   Putting on and taking off regular lower body clothing? 3   Bathing (including washing, rinsing, and drying)? 3    Toileting, which includes using a toilet, bedpan, or urinal? 3   Putting on and taking off regular upper body clothing? 3   Taking care of personal grooming such as brushing teeth? 3   Eating meals? 4   6 Clicks Daily Activity Score 19   Functional Mobility   Sit to Stand Standby Assist   Bed, Chair, Wheelchair Transfer Standby Assist   Toilet Transfers Refused   Transfer Method Stand Step   Mobility bed mobility, mobility in room, up to chair   Skilled Intervention Verbal Cuing;Facilitation   Activity Tolerance   Sitting in Chair left seated in chair   Sitting Edge of Bed 10 min   Standing 10 min   Short Term Goals   Short Term Goal # 1 Pt will complete ADL transfers with supervision   Goal Outcome # 1 Goal met   Short Term Goal # 2 Pt will complete LB dressing with supervision   Goal Outcome # 2 Goal met   Short Term Goal # 3 Pt will complete toileting with supervision   Goal Outcome # 3 Goal not met   Short Term Goal # 4 Pt will complete standing G/H with supervision   Education Group   Education Provided Role of Occupational Therapist   Role of Occupational Therapist Patient Response Patient;Acceptance;Explanation;Action Demonstration   Anticipated Discharge Equipment and Recommendations   DC Equipment Recommendations Unable to determine at this time   Discharge Recommendations Recommend post-acute placement for additional occupational therapy services prior to discharge home   Interdisciplinary Plan of Care Collaboration   IDT Collaboration with  Nursing   Patient Position at End of Therapy Seated;Chair Alarm On;Call Light within Reach;Tray Table within Reach;Phone within Reach   Collaboration Comments RN updated

## 2022-03-15 NOTE — HOSPITAL COURSE
A 41-year-old man with h/o conversion disorder, syncope, DM2, hypothyroid, seizure, HTN, PTSD, depression and recurrent R-sided weakness and sluttering (prior normal MRIs) presented with right-sided weakness and speech changes. Presenting  symptoms were similar to prior presentations in recent past.   CT head, CT head perfusion, CTA head and neck in the ED negative for acute abnormality.    Neurology consulted in the ED, conversion disorder suspected.     Psychiatry evaluated the patient - functional neurological disorder was considered. PT/OT evaluated the patient - recommended placement to SNF. Since then, CM/SW team has been working on placement.

## 2022-03-15 NOTE — DISCHARGE PLANNING
Anticipated Discharge Disposition:   Group home with outpatient therapies    Action:    Pt ambulated today with PT and able to use FWW at 35 ft distance.  He is SBA for transfer.  Also evaluated by OT and pt is supervision for bed mobility and ADLs.      RN CM left vm for patient's [facility] outpatient , Sarah with request to return call.  Left vm for outpatient [facility] Manager, Adrian Dao with request to return call.    [place] declined because of no Medicaid beds.    Barriers to Discharge:    Placement    Plan:    F/U with [facility] outpatient.

## 2022-03-15 NOTE — PROGRESS NOTES
[facility] Medicine Daily Progress Note    Date of Service  3/15/2022    Chief Complaint  Norm Prabhakar is a 41 y.o. male admitted 2/26/2022 with right arm weakness    Hospital Course  A 41-year-old man with h/o conversion disorder, syncope, DM2, hypothyroid, seizure, HTN, PTSD, depression and recurrent R-sided weakness and sluttering (prior normal MRIs) presented with right-sided weakness and speech changes. Presenting  symptoms were similar to prior presentations in recent past.   CT head, CT head perfusion, CTA head and neck in the ED negative for acute abnormality.    Neurology consulted in the ED, conversion disorder suspected.     Psychiatry evaluated the patient - functional neurological disorder was considered. PT/OT evaluated the patient - recommended placement to SNF. Since then, CM/SW team has been working on placement.     Interval Problem Update  Patient reports cough. Denies SOB, chest pain.  Afebrile, hemodynamically stable.    I have personally seen and examined the patient at bedside. I discussed the plan of care with patient, bedside RN, charge RN,  and pharmacy.    Consultants/Specialty  neurology, psychiatry and physiatry    Code Status  Full Code    Disposition  Patient is medically cleared pending Group home for discharge.   Anticipate discharge to Group home.  I have placed the appropriate orders for post-discharge needs.    Review of Systems  Review of Systems   Constitutional: Negative for chills, fever and malaise/fatigue.   HENT: Negative.    Eyes: Negative.    Respiratory: Positive for cough. Negative for shortness of breath.    Cardiovascular: Negative for chest pain.   Gastrointestinal: Negative for abdominal pain, nausea and vomiting.   Genitourinary: Negative for dysuria.   Musculoskeletal: Negative for myalgias.   Skin: Negative for rash.   Neurological: Negative for headaches.        Physical Exam  Temp:  [36 °C (96.8 °F)-36.3 °C (97.3 °F)] 36.2 °C (97.1 °F)  Pulse:   [55-79] 58  Resp:  [18] 18  BP: (111-124)/(63-75) 124/74  SpO2:  [92 %-96 %] 96 %    Physical Exam  Vitals and nursing note reviewed.   Constitutional:       Appearance: He is ill-appearing.   HENT:      Head: Normocephalic.      Nose: Nose normal.      Mouth/Throat:      Mouth: Mucous membranes are moist.   Eyes:      Pupils: Pupils are equal, round, and reactive to light.   Cardiovascular:      Rate and Rhythm: Normal rate and regular rhythm.   Pulmonary:      Effort: Pulmonary effort is normal. No respiratory distress.      Breath sounds: No wheezing or rales.   Abdominal:      General: Abdomen is flat. Bowel sounds are normal. There is no distension.      Tenderness: There is no abdominal tenderness. There is no guarding.   Musculoskeletal:         General: Normal range of motion.      Cervical back: Normal range of motion.   Skin:     General: Skin is warm.   Neurological:      Mental Status: He is alert and oriented to person, place, and time.      Motor: Weakness (right-sided) present.      Comments: Slurred speech         Fluids    Intake/Output Summary (Last 24 hours) at 3/15/2022 1427  Last data filed at 3/15/2022 0718  Gross per 24 hour   Intake --   Output 950 ml   Net -950 ml       Laboratory                        Imaging  US-EXTREMITY VENOUS UPPER UNILAT RIGHT   Final Result      DX-CHEST-PORTABLE (1 VIEW)   Final Result      No acute cardiopulmonary abnormality.      CT-CTA NECK WITH & W/O-POST PROCESSING   Final Result      1.  No occlusion or hemodynamically significant stenosis of the neck arteries.   2.  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      CT-CTA HEAD WITH & W/O-POST PROCESS   Final Result      CT angiogram of the [place] within normal limits.      CT-CEREBRAL PERFUSION ANALYSIS   Final Result      1.  Cerebral blood flow less than 30% likely representing completed infarct = 0 mL.      2.  T Max more than 6 seconds likely  representing combination of completed infarct and ischemia = 0 mL.      3.  Mismatched volume likely representing ischemic brain/penumbra = None      4.  Please note that the cerebral perfusion was performed on the limited brain tissue around the basal ganglia region. Infarct/ischemia outside the CT perfusion sections can be missed in this study.      CT-HEAD W/O   Final Result      1.  Confluent cerebral white matter hypodensities again noted which may represent severe chronic leukoencephalopathy..   2.  No acute intracranial abnormality.              Assessment/Plan  * Acute right-sided weakness- (present on admission)  Assessment & Plan  Chronic recurrent issue, per our records he has had 19 MRI head since 2011 with the majority ordered for right-sided weakness  CT scans in the ED negative for acute abnormality, chronic moderate cerebellar loss  Patient says this is similar to his previous episodes and symptoms usually resolve in a few days or less  Weakness likely due to conversion disorder  Psychiatry following  Patient refused by all SNFs, not a rehab candidate  Medically cleared for discharge to group home, SW working on group home placement  Appeared to have some improvement both RUE and RLE 3-4/5    Conversion disorder- (present on admission)  Assessment & Plan  Recurrent right-sided weakness, admitted again for this  Psych consulted  Currently on  Depakote 1500 mg P.O at HS  Lurasidone 20mg P.O in the evening  Prazosin 4mg P.O every evening  Sertraline 150mg P.O Daily    Primary hypertension- (present on admission)  Assessment & Plan  Continue lisinopril    Mixed hyperlipidemia- (present on admission)  Assessment & Plan  Continue atorvastatin    Class 1 obesity due to excess calories with serious comorbidity and body mass index (BMI) of 32.0 to 32.9 in adult- (present on admission)  Assessment & Plan  BMI 32.7    Type 2 diabetes mellitus without complication, without long-term current use of insulin (HCC)-  (present on admission)  Assessment & Plan  Hold home orals.  Continue insulin glargine  Insulin sliding scale hypoglycemia protocol in the hospital    PTSD (post-traumatic stress disorder)- (present on admission)  Assessment & Plan  Continue sertraline and prazosin    History of seizure- (present on admission)  Assessment & Plan  Continue divalproex    Anxiety and depression- (present on admission)  Assessment & Plan  Continue sertraline    Postoperative hypothyroidism- (present on admission)  Assessment & Plan  Continue levothyroxine  Thyroid mass, h/o goiter  CT neck shows thyroid mass  TSH 8.1     Asthma- (present on admission)  Assessment & Plan  Continue montelukast and home inhaler       VTE prophylaxis: therapeutic anticoagulation with rivaroxaban    I have performed a physical exam and reviewed and updated ROS and Plan today (3/15/2022). In review of yesterday's note (3/14/2022), there are no changes except as documented above.

## 2022-03-15 NOTE — CARE PLAN
The patient is Stable - Low risk of patient condition declining or worsening    Shift Goals  Clinical Goals: Mobilize  Patient Goals: Rest  Family Goals: JUANCARLOS    Progress made toward(s) clinical / shift goals:  Pt up to chair for lunch after coaxing. Rested throughout day.     Patient is not progressing towards the following goals:

## 2022-03-15 NOTE — CARE PLAN
The patient is Stable - Low risk of patient condition declining or worsening    Shift Goals  Clinical Goals: Safety  Patient Goals: Sleep  Family Goals: JUANCARLOS    Progress made toward(s) clinical / shift goals:    Problem: Knowledge Deficit - Standard  Goal: Patient and family/care givers will demonstrate understanding of plan of care, disease process/condition, diagnostic tests and medications  Outcome: Progressing     Problem: Self Care  Goal: Patient will have the ability to perform ADLs independently or with assistance (bathe, groom, dress, toilet and feed)  Outcome: Progressing     Problem: Neuro Status  Goal: Neuro status will remain stable or improve  Outcome: Progressing       Patient is not progressing towards the following goals:

## 2022-03-15 NOTE — CARE PLAN
Problem: Knowledge Deficit - Standard  Goal: Patient and family/care givers will demonstrate understanding of plan of care, disease process/condition, diagnostic tests and medications  Outcome: Progressing  Note: Pt AOx4 able to make needs known, able to verbalize understanding of POC.      Problem: Pain - Standard  Goal: Alleviation of pain or a reduction in pain to the patient’s comfort goal  Outcome: Progressing  Flowsheets  Taken 3/15/2022 0938  OB Pain Intervention:   Declines   Food   Relaxation technique   Repositioned   Rest   Ambulation / Increased activity  Taken 3/15/2022 0718  Non Verbal Scale:   Calm   Unlabored Breathing  Pain Rating Scale (NPRS): 0  Note: Pt denies any pain this time, PRN medication in place.      Problem: Neuro Status  Goal: Neuro status will remain stable or improve  Outcome: Progressing  Flowsheets (Taken 3/15/2022 0938)  Level of Consciousness: Alert  Note: Pt AOx4 able to make needs know, Neuro protocol in place.      Problem: Mobility  Goal: Patient's capacity to carry out activities will improve  Outcome: Progressing   The patient is Stable - Low risk of patient condition declining or worsening    Shift Goals  Clinical Goals: Safety  Patient Goals: Sleep  Family Goals: JUANCARLOS    Progress made toward(s) clinical / shift goals:  PT/ OT work with pt, rest, pain management, Neuro assessments.     Patient is not progressing towards the following goals:

## 2022-03-15 NOTE — CONSULTS
"BEHAVIORAL [facility] INPATIENT PSYCHIATRIC CONSULT LIAISON NOTE:     DOS: 03/15/2022    REASON FOR CONSULT: \"Patient states sudden onset right sided weakness. Patient has no effort against gravity for R arm or R leg. Facial droop noted. Patient has stuttered speech and endorses HA\"    CC: “I am doing well today”    HPI: Norm Prabhakar is a 41 year old male patient who presented to the ED on 2/26/2022 with sudden right-sided weakness and speech changes.  PMH of asthma, DM 2, bipolar disorder, hypothyroidism, seizure disorder, depression/anxiety, HTN, dyslipidemia.  Said he was last normal around 6:30 when symptoms began.  This is a chronic issue for him since 2010 with multiple previous negative MRIs.  Current symptoms are similar to prior, says usually improve in a few days days    Patient was seen today as a follow up consult. Reports sleeping very well last night.  Denies SI/HI/AVH, right arm movement much improved. Denies having any symptoms of depression and or anxiety at this time. Reports his fiancee is a great motivator for him. Looking forward to getting better and discharging to his group home.    LEGAL HOLD: Voluntary    ALLERGIES: Abilify, Fish, Geodon [ziprasidone hcl], Aripiprazole     PAST MEDICAL HISTORY:  ASTHMA,  Diabetes ([facility]), Fall, Glaucoma (1982), Hypothyroidism, Indigestion,  Murmur, Pneumonia, S/P thyroidectomy, Seizure ([facility]) (2010), and Unspecified disorder of thyroid    PAST PSYCHIATRIC HISTORY:  Bipolar Disorder I.   Depression.   Anxiety    LEGAL HISTORY:  Denies    SOCIAL HISTORY:  Quit smoking 22 month ago. Previous alcohol use.    CURRENT HOSPITAL PROBLEMS:  Acute right-sided weakness   Anxiety and depression   Asthma   Class 1 obesity due to excess calories with serious comorbidity and body mass index (BMI) of 32.0 to 32.9 in adult   Conversion disorder   History of seizure   Mixed hyperlipidemia   Postoperative hypothyroidism   Primary hypertension   PTSD (post-traumatic " "stress disorder)   Type 2 diabetes mellitus without complication, without long-term current use of insulin ([facility])       PSYCHIATRIC EXAMNIATION:  VITALS: WNL    MENTAL STATUS EXAM:  Appearance: Dressed in hospital gown, normal grooming and hygiene, no acute  distress.   Attitude: Calm and cooperative.  Behavior: Sitting up by his bedside, using his laptop  Musculoskeletal:  Right arm movement much better today  Eye Contact: Adequate.  Speech: Stutter, coherent, adequate volume  Affect: Normal  Mood: \"I am happy, I spoke with my brother, my aunt and my Fiancee\"  Process: Goal-directed, organized. Linear  Content: Negative for Suicidal and Homicidal ideations.  Perception: Negative for auditory and visual hallucinations  Orientation: Oriented to Time, Place, Person and Self.  Memory: No significant deficits elicited  Insight: Fair    LABS:  Unremarkable    CURRENT PSYCHIATRIC MEDICATIONS:  Depakote 1500 mg P.O at HS  Lurasidone 20mg P.O in the evening  Prazosin 4mg P.O every evening  Sertraline 150mg P.O Daily    ASSESSMENT AND PLAN:  -Mood Disorder due to another health conditio- Unspecified  -Functional Neurological disorder  -PTSD-Chronic  -Conversion Disorder (recurrent right sided weakness since 2010, lasts for a few days then gets better. Patient is left handed)    Legal Hold: Voluntary  -No medication changes needed at this time.  -Psych CL will continue following patient while at [facility].  -Medication reconciliation was completed.  -Reviewed safety plan - 911, ER, PCM, MHC, Suicide Crisis Line, Nursing staff while    inpatient.  -Visitors: Yes  -Personal Belongings: Yes  -Observation Level:Tele monitor    -Instruction: Discharge recommendations per treatment team.    "

## 2022-03-16 LAB
ANION GAP SERPL CALC-SCNC: 13 MMOL/L (ref 7–16)
BASOPHILS # BLD AUTO: 0.8 % (ref 0–1.8)
BASOPHILS # BLD: 0.08 K/UL (ref 0–0.12)
BUN SERPL-MCNC: 22 MG/DL (ref 8–22)
CALCIUM SERPL-MCNC: 9.4 MG/DL (ref 8.5–10.5)
CHLORIDE SERPL-SCNC: 102 MMOL/L (ref 96–112)
CO2 SERPL-SCNC: 24 MMOL/L (ref 20–33)
CREAT SERPL-MCNC: 0.78 MG/DL (ref 0.5–1.4)
EOSINOPHIL # BLD AUTO: 0.13 K/UL (ref 0–0.51)
EOSINOPHIL NFR BLD: 1.4 % (ref 0–6.9)
ERYTHROCYTE [DISTWIDTH] IN BLOOD BY AUTOMATED COUNT: 43.1 FL (ref 35.9–50)
GFR SERPLBLD CREATININE-BSD FMLA CKD-EPI: 116 ML/MIN/1.73 M 2
GLUCOSE BLD STRIP.AUTO-MCNC: 139 MG/DL (ref 65–99)
GLUCOSE BLD STRIP.AUTO-MCNC: 152 MG/DL (ref 65–99)
GLUCOSE BLD STRIP.AUTO-MCNC: 159 MG/DL (ref 65–99)
GLUCOSE BLD STRIP.AUTO-MCNC: 170 MG/DL (ref 65–99)
GLUCOSE BLD STRIP.AUTO-MCNC: 236 MG/DL (ref 65–99)
GLUCOSE SERPL-MCNC: 187 MG/DL (ref 65–99)
HCT VFR BLD AUTO: 41.6 % (ref 42–52)
HGB BLD-MCNC: 13.8 G/DL (ref 14–18)
IMM GRANULOCYTES # BLD AUTO: 0.13 K/UL (ref 0–0.11)
IMM GRANULOCYTES NFR BLD AUTO: 1.4 % (ref 0–0.9)
LYMPHOCYTES # BLD AUTO: 4.54 K/UL (ref 1–4.8)
LYMPHOCYTES NFR BLD: 47.6 % (ref 22–41)
MCH RBC QN AUTO: 29.2 PG (ref 27–33)
MCHC RBC AUTO-ENTMCNC: 33.2 G/DL (ref 33.7–35.3)
MCV RBC AUTO: 87.9 FL (ref 81.4–97.8)
MONOCYTES # BLD AUTO: 0.94 K/UL (ref 0–0.85)
MONOCYTES NFR BLD AUTO: 9.9 % (ref 0–13.4)
NEUTROPHILS # BLD AUTO: 3.72 K/UL (ref 1.82–7.42)
NEUTROPHILS NFR BLD: 38.9 % (ref 44–72)
NRBC # BLD AUTO: 0 K/UL
NRBC BLD-RTO: 0 /100 WBC
PLATELET # BLD AUTO: 256 K/UL (ref 164–446)
PMV BLD AUTO: 9.8 FL (ref 9–12.9)
POTASSIUM SERPL-SCNC: 4 MMOL/L (ref 3.6–5.5)
RBC # BLD AUTO: 4.73 M/UL (ref 4.7–6.1)
SODIUM SERPL-SCNC: 139 MMOL/L (ref 135–145)
WBC # BLD AUTO: 9.5 K/UL (ref 4.8–10.8)

## 2022-03-16 PROCEDURE — 82962 GLUCOSE BLOOD TEST: CPT | Mod: 91

## 2022-03-16 PROCEDURE — 99224 PR SUBSEQUENT OBSERVATION CARE,LEVEL I: CPT | Performed by: STUDENT IN AN ORGANIZED HEALTH CARE EDUCATION/TRAINING PROGRAM

## 2022-03-16 PROCEDURE — A9270 NON-COVERED ITEM OR SERVICE: HCPCS | Performed by: STUDENT IN AN ORGANIZED HEALTH CARE EDUCATION/TRAINING PROGRAM

## 2022-03-16 PROCEDURE — 700102 HCHG RX REV CODE 250 W/ 637 OVERRIDE(OP): Performed by: STUDENT IN AN ORGANIZED HEALTH CARE EDUCATION/TRAINING PROGRAM

## 2022-03-16 PROCEDURE — 96372 THER/PROPH/DIAG INJ SC/IM: CPT | Mod: XU

## 2022-03-16 PROCEDURE — G0378 HOSPITAL OBSERVATION PER HR: HCPCS

## 2022-03-16 PROCEDURE — 80048 BASIC METABOLIC PNL TOTAL CA: CPT

## 2022-03-16 PROCEDURE — 36415 COLL VENOUS BLD VENIPUNCTURE: CPT

## 2022-03-16 PROCEDURE — 85025 COMPLETE CBC W/AUTO DIFF WBC: CPT

## 2022-03-16 RX ADMIN — DIVALPROEX SODIUM 1500 MG: 500 TABLET, DELAYED RELEASE ORAL at 20:18

## 2022-03-16 RX ADMIN — LATANOPROST 1 DROP: 50 SOLUTION OPHTHALMIC at 20:18

## 2022-03-16 RX ADMIN — CYCLOBENZAPRINE 5 MG: 10 TABLET, FILM COATED ORAL at 20:31

## 2022-03-16 RX ADMIN — RIVAROXABAN 10 MG: 10 TABLET, FILM COATED ORAL at 08:32

## 2022-03-16 RX ADMIN — INSULIN HUMAN 3 UNITS: 100 INJECTION, SOLUTION PARENTERAL at 20:30

## 2022-03-16 RX ADMIN — INSULIN HUMAN 2 UNITS: 100 INJECTION, SOLUTION PARENTERAL at 17:58

## 2022-03-16 RX ADMIN — INSULIN GLARGINE 30 UNITS: 100 INJECTION, SOLUTION SUBCUTANEOUS at 05:56

## 2022-03-16 RX ADMIN — PRAZOSIN HYDROCHLORIDE 4 MG: 2 CAPSULE ORAL at 17:59

## 2022-03-16 RX ADMIN — SERTRALINE 150 MG: 50 TABLET, FILM COATED ORAL at 20:18

## 2022-03-16 RX ADMIN — BUDESONIDE AND FORMOTEROL FUMARATE DIHYDRATE 2 PUFF: 160; 4.5 AEROSOL RESPIRATORY (INHALATION) at 05:29

## 2022-03-16 RX ADMIN — MONTELUKAST 10 MG: 10 TABLET, FILM COATED ORAL at 17:59

## 2022-03-16 RX ADMIN — INSULIN GLARGINE 30 UNITS: 100 INJECTION, SOLUTION SUBCUTANEOUS at 17:59

## 2022-03-16 RX ADMIN — LURASIDONE HYDROCHLORIDE 20 MG: 20 TABLET, FILM COATED ORAL at 17:59

## 2022-03-16 RX ADMIN — BUDESONIDE AND FORMOTEROL FUMARATE DIHYDRATE 2 PUFF: 160; 4.5 AEROSOL RESPIRATORY (INHALATION) at 17:58

## 2022-03-16 RX ADMIN — ATORVASTATIN CALCIUM 20 MG: 20 TABLET, FILM COATED ORAL at 17:59

## 2022-03-16 RX ADMIN — SENNOSIDES AND DOCUSATE SODIUM 2 TABLET: 50; 8.6 TABLET ORAL at 05:28

## 2022-03-16 RX ADMIN — LEVOTHYROXINE SODIUM 225 MCG: 0.07 TABLET ORAL at 05:28

## 2022-03-16 RX ADMIN — INSULIN HUMAN 2 UNITS: 100 INJECTION, SOLUTION PARENTERAL at 08:33

## 2022-03-16 RX ADMIN — INSULIN HUMAN 2 UNITS: 100 INJECTION, SOLUTION PARENTERAL at 13:16

## 2022-03-16 ASSESSMENT — PAIN DESCRIPTION - PAIN TYPE: TYPE: ACUTE PAIN

## 2022-03-16 NOTE — PROGRESS NOTES
[facility] Medicine Daily Progress Note    Date of Service  3/16/2022    Chief Complaint  Norm Prabhakar is a 41 y.o. male admitted 2/26/2022 with right arm weakness    Hospital Course  A 41-year-old man with h/o conversion disorder, syncope, DM2, hypothyroid, seizure, HTN, PTSD, depression and recurrent R-sided weakness and sluttering (prior normal MRIs) presented with right-sided weakness and speech changes. Presenting  symptoms were similar to prior presentations in recent past.   CT head, CT head perfusion, CTA head and neck in the ED negative for acute abnormality.    Neurology consulted in the ED, conversion disorder suspected.     Psychiatry evaluated the patient - functional neurological disorder was considered. PT/OT evaluated the patient - recommended placement to SNF. Since then, CM/SW team has been working on placement.       Interval Problem Update  Afebrile, hemodynamically stable. On room air.  Labs reviewed.      I have personally seen and examined the patient at bedside. I discussed the plan of care with patient, bedside RN, charge RN,  and pharmacy.    Consultants/Specialty  neurology, psychiatry and physiatry    Code Status  Full Code    Disposition  Patient is medically cleared pending placement for discharge.   Anticipate discharge to group home.  I have placed the appropriate orders for post-discharge needs.    Review of Systems  Review of Systems   Constitutional: Negative for chills, fever and malaise/fatigue.   HENT: Negative.    Eyes: Negative.    Respiratory: Positive for cough. Negative for shortness of breath.    Cardiovascular: Negative for chest pain.   Gastrointestinal: Negative for abdominal pain, nausea and vomiting.   Genitourinary: Negative for dysuria.   Musculoskeletal: Negative for myalgias.   Skin: Negative for rash.   Neurological: Negative for headaches.      Physical Exam  Temp:  [36 °C (96.8 °F)-36.4 °C (97.5 °F)] 36 °C (96.8 °F)  Pulse:  [55-75] 63  Resp:   [17-18] 18  BP: ()/(70-80) 123/80  SpO2:  [94 %-96 %] 95 %    Physical Exam  Vitals and nursing note reviewed.   Constitutional:       Appearance: He is ill-appearing.   HENT:      Head: Normocephalic.      Nose: Nose normal.      Mouth/Throat:      Mouth: Mucous membranes are moist.   Eyes:      Pupils: Pupils are equal, round, and reactive to light.   Cardiovascular:      Rate and Rhythm: Normal rate and regular rhythm.   Pulmonary:      Effort: Pulmonary effort is normal. No respiratory distress.      Breath sounds: No wheezing or rales.   Abdominal:      General: Abdomen is flat. Bowel sounds are normal. There is no distension.      Tenderness: There is no abdominal tenderness. There is no guarding.   Musculoskeletal:         General: Normal range of motion.      Cervical back: Normal range of motion.   Skin:     General: Skin is warm.   Neurological:      Mental Status: He is alert and oriented to person, place, and time.      Motor: Weakness (right-sided) present.      Comments: Slurred speech     Fluids    Intake/Output Summary (Last 24 hours) at 3/16/2022 1151  Last data filed at 3/16/2022 0800  Gross per 24 hour   Intake --   Output 2450 ml   Net -2450 ml       Laboratory  Recent Labs     03/16/22  0316   WBC 9.5   RBC 4.73   HEMOGLOBIN 13.8*   HEMATOCRIT 41.6*   MCV 87.9   MCH 29.2   MCHC 33.2*   RDW 43.1   PLATELETCT 256   MPV 9.8     Recent Labs     03/16/22  0316   SODIUM 139   POTASSIUM 4.0   CHLORIDE 102   CO2 24   GLUCOSE 187*   BUN 22   CREATININE 0.78   CALCIUM 9.4                   Imaging  US-EXTREMITY VENOUS UPPER UNILAT RIGHT   Final Result      DX-CHEST-PORTABLE (1 VIEW)   Final Result      No acute cardiopulmonary abnormality.      CT-CTA NECK WITH & W/O-POST PROCESSING   Final Result      1.  No occlusion or hemodynamically significant stenosis of the neck arteries.   2.  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      CT-CTA HEAD WITH &  W/O-POST PROCESS   Final Result      CT angiogram of the [place] within normal limits.      CT-CEREBRAL PERFUSION ANALYSIS   Final Result      1.  Cerebral blood flow less than 30% likely representing completed infarct = 0 mL.      2.  T Max more than 6 seconds likely representing combination of completed infarct and ischemia = 0 mL.      3.  Mismatched volume likely representing ischemic brain/penumbra = None      4.  Please note that the cerebral perfusion was performed on the limited brain tissue around the basal ganglia region. Infarct/ischemia outside the CT perfusion sections can be missed in this study.      CT-HEAD W/O   Final Result      1.  Confluent cerebral white matter hypodensities again noted which may represent severe chronic leukoencephalopathy..   2.  No acute intracranial abnormality.              Assessment/Plan  * Acute right-sided weakness- (present on admission)  Assessment & Plan  Chronic recurrent issue, per our records he has had 19 MRI head since 2011 with the majority ordered for right-sided weakness  CT scans in the ED negative for acute abnormality, chronic moderate cerebellar loss  Patient says this is similar to his previous episodes and symptoms usually resolve in a few days or less  Weakness likely due to conversion disorder  Psychiatry following  Patient refused by all SNFs, not a rehab candidate  Medically cleared for discharge to group home, SW working on group home placement  Appeared to have some improvement both RUE and RLE 3-4/5    Conversion disorder- (present on admission)  Assessment & Plan  Recurrent right-sided weakness, admitted again for this  Psych consulted  Currently on  Depakote 1500 mg P.O at HS  Lurasidone 20mg P.O in the evening  Prazosin 4mg P.O every evening  Sertraline 150mg P.O Daily    Primary hypertension- (present on admission)  Assessment & Plan  Continue lisinopril    Mixed hyperlipidemia- (present on admission)  Assessment & Plan  Continue  atorvastatin    Class 1 obesity due to excess calories with serious comorbidity and body mass index (BMI) of 32.0 to 32.9 in adult- (present on admission)  Assessment & Plan  BMI 32.7  Outpatient weight loss counseling    Type 2 diabetes mellitus without complication, without long-term current use of insulin (HCC)- (present on admission)  Assessment & Plan  Hold home orals.  Continue insulin glargine  Insulin sliding scale hypoglycemia protocol in the hospital    PTSD (post-traumatic stress disorder)- (present on admission)  Assessment & Plan  Continue sertraline and prazosin    History of seizure- (present on admission)  Assessment & Plan  Continue divalproex    Anxiety and depression- (present on admission)  Assessment & Plan  Continue sertraline    Postoperative hypothyroidism- (present on admission)  Assessment & Plan  Continue levothyroxine  Thyroid mass, h/o goiter  CT neck shows thyroid mass  TSH 8.1     Asthma- (present on admission)  Assessment & Plan  Continue montelukast and home inhaler       VTE prophylaxis: therapeutic anticoagulation with rivaroxaban    I have performed a physical exam and reviewed and updated ROS and Plan today (3/16/2022). In review of yesterday's note (3/15/2022), there are no changes except as documented above.

## 2022-03-16 NOTE — CONSULTS
Psychology consult received for psychotherapy. Dr Norton will be available tomorrow and will follow up with the patient.

## 2022-03-16 NOTE — DISCHARGE PLANNING
RN/CM escalated patient to  leadership.      supervisor called     [facility], , Sarah [phone] and talked about options for GH. She reports he has been discharged from housing. Asked if he can get into another GH, she reports they can reassess them for that. She sent out email for approval for reassessment and expects to hear back today. She will call RN/Catrina SHOOK back to coordinate time to reassess.     Philip reports patient is on the list for [facility], but has not come up yet.     Requested his notes from [facility]. Will send H&P, MD, updated PT/OT notes for review. Sent to fax [phone]

## 2022-03-16 NOTE — CARE PLAN
The patient is Stable - Low risk of patient condition declining or worsening    Shift Goals  Clinical Goals: Mobility  Patient Goals: NA  Family Goals: JUANCARLOS    Progress made toward(s) clinical / shift goals:  Patient resting in bed. Declining to sit up in chair for breakfast. No c/o pain.

## 2022-03-16 NOTE — CONSULTS
"[facility] INPATIENT PSYCHIATRIC CONSULT LIAISON NOTE:     DOS: 03/16/2022    REASON FOR CONSULT: \"Patient states sudden onset right sided weakness. Patient has no effort against gravity for R arm or R leg. Facial droop noted. Patient has stuttered speech and endorses HA\"      HPI: Norm Prabhakar is a 41 year old male patient who presented to the ED on 2/26/2022 with sudden right-sided weakness and speech changes.  PMH of asthma, DM 2, bipolar disorder, hypothyroidism, seizure disorder, depression/anxiety, HTN, dyslipidemia.  Said he was last normal around 6:30 when symptoms began.  This is a chronic issue for him since 2010 with multiple previous negative MRIs.  Current symptoms are similar to prior, says usually improve in a few days days    Patient was seen today as a follow up consult. Reports sleeping very well last night.  Denies SI/HI/AVH. Able to move both right extremities better now. Reports he is working with the SW per discharge needs, possibly PT at home.  Denies having any symptoms of depression and or anxiety at this time. Reports his fiancee is a great motivator for him. Looking forward to getting better and discharging home.    LEGAL HOLD: Voluntary    ALLERGIES: Abilify, Fish, Geodon [ziprasidone hcl], Aripiprazole     PAST MEDICAL HISTORY:  ASTHMA,  Diabetes ([facility]), Fall, Glaucoma (1982), Hypothyroidism, Indigestion,  Murmur, Pneumonia, S/P thyroidectomy, Seizure ([facility]) (2010), and Unspecified disorder of thyroid    PAST PSYCHIATRIC HISTORY:  Bipolar Disorder I.   Depression.   Anxiety    LEGAL HISTORY:  Denies    SOCIAL HISTORY:  Quit smoking 22 month ago. Previous alcohol use.    CURRENT HOSPITAL PROBLEMS:  Acute right-sided weakness   Anxiety and depression   Asthma   Class 1 obesity due to excess calories with serious comorbidity and body mass index (BMI) of 32.0 to 32.9 in adult   Conversion disorder   History of seizure   Mixed hyperlipidemia   Postoperative hypothyroidism " "  Primary hypertension   PTSD (post-traumatic stress disorder)   Type 2 diabetes mellitus without complication, without long-term current use of insulin ([facility])       PSYCHIATRIC EXAMNIATION:  VITALS: WNL    MENTAL STATUS EXAM:  Appearance: Dressed in hospital gown, normal grooming and hygiene, no acute  distress.   Attitude: Calm and cooperative.  Behavior: Sitting up by his bedside, using his laptop  Musculoskeletal:  Right arm movement much better today  Eye Contact: Adequate.  Speech: Stutter, coherent, adequate volume  Affect: Normal  Mood: \"I am happy, I spoke with my brother\"  Process: Goal-directed, organized. Linear  Content: Negative for Suicidal and Homicidal ideations.  Perception: Negative for auditory and visual hallucinations  Orientation: Oriented to Time, Place, Person and Self.  Memory: No significant deficits elicited  Insight: Fair    LABS:  Unremarkable    CURRENT PSYCHIATRIC MEDICATIONS:  Depakote 1500 mg P.O at HS  Lurasidone 20mg P.O in the evening  Prazosin 4mg P.O every evening  Sertraline 150mg P.O Daily    ASSESSMENT AND PLAN:  -Mood Disorder due to another health conditio- Unspecified  -Functional Neurological disorder  -PTSD-Chronic  -Conversion Disorder (recurrent right sided weakness since 2010, lasts for a few days then gets better. Patient is left handed)    Legal Hold: Voluntary  -No medication changes needed at this time.  -Psych CL will continue following patient while at [facility].  -Medication reconciliation was completed.  -Reviewed safety plan - 911, ER, PCM, MHC, Suicide Crisis Line, Nursing staff while    inpatient.  -Visitors: Yes  -Personal Belongings: Yes  -Observation Level:Tele monitor    -Instruction: Discharge recommendations per treatment team.    "

## 2022-03-17 LAB
GLUCOSE BLD STRIP.AUTO-MCNC: 162 MG/DL (ref 65–99)
GLUCOSE BLD STRIP.AUTO-MCNC: 191 MG/DL (ref 65–99)
GLUCOSE BLD STRIP.AUTO-MCNC: 213 MG/DL (ref 65–99)

## 2022-03-17 PROCEDURE — A9270 NON-COVERED ITEM OR SERVICE: HCPCS | Performed by: STUDENT IN AN ORGANIZED HEALTH CARE EDUCATION/TRAINING PROGRAM

## 2022-03-17 PROCEDURE — G0378 HOSPITAL OBSERVATION PER HR: HCPCS

## 2022-03-17 PROCEDURE — 82962 GLUCOSE BLOOD TEST: CPT | Mod: 91

## 2022-03-17 PROCEDURE — 90791 PSYCH DIAGNOSTIC EVALUATION: CPT | Performed by: SOCIAL WORKER

## 2022-03-17 PROCEDURE — 96372 THER/PROPH/DIAG INJ SC/IM: CPT | Mod: XU

## 2022-03-17 PROCEDURE — 700102 HCHG RX REV CODE 250 W/ 637 OVERRIDE(OP): Performed by: STUDENT IN AN ORGANIZED HEALTH CARE EDUCATION/TRAINING PROGRAM

## 2022-03-17 PROCEDURE — 99224 PR SUBSEQUENT OBSERVATION CARE,LEVEL I: CPT | Performed by: STUDENT IN AN ORGANIZED HEALTH CARE EDUCATION/TRAINING PROGRAM

## 2022-03-17 RX ADMIN — INSULIN GLARGINE 30 UNITS: 100 INJECTION, SOLUTION SUBCUTANEOUS at 05:11

## 2022-03-17 RX ADMIN — LEVOTHYROXINE SODIUM 225 MCG: 0.07 TABLET ORAL at 05:11

## 2022-03-17 RX ADMIN — INSULIN HUMAN 2 UNITS: 100 INJECTION, SOLUTION PARENTERAL at 16:32

## 2022-03-17 RX ADMIN — BUDESONIDE AND FORMOTEROL FUMARATE DIHYDRATE 2 PUFF: 160; 4.5 AEROSOL RESPIRATORY (INHALATION) at 05:17

## 2022-03-17 RX ADMIN — MONTELUKAST 10 MG: 10 TABLET, FILM COATED ORAL at 17:10

## 2022-03-17 RX ADMIN — LISINOPRIL 10 MG: 10 TABLET ORAL at 05:11

## 2022-03-17 RX ADMIN — PRAZOSIN HYDROCHLORIDE 4 MG: 2 CAPSULE ORAL at 17:10

## 2022-03-17 RX ADMIN — BUDESONIDE AND FORMOTEROL FUMARATE DIHYDRATE 2 PUFF: 160; 4.5 AEROSOL RESPIRATORY (INHALATION) at 17:11

## 2022-03-17 RX ADMIN — INSULIN HUMAN 3 UNITS: 100 INJECTION, SOLUTION PARENTERAL at 20:30

## 2022-03-17 RX ADMIN — INSULIN GLARGINE 30 UNITS: 100 INJECTION, SOLUTION SUBCUTANEOUS at 17:14

## 2022-03-17 RX ADMIN — DIVALPROEX SODIUM 1500 MG: 500 TABLET, DELAYED RELEASE ORAL at 20:25

## 2022-03-17 RX ADMIN — SERTRALINE 150 MG: 50 TABLET, FILM COATED ORAL at 20:25

## 2022-03-17 RX ADMIN — ATORVASTATIN CALCIUM 20 MG: 20 TABLET, FILM COATED ORAL at 17:10

## 2022-03-17 RX ADMIN — INSULIN HUMAN 2 UNITS: 100 INJECTION, SOLUTION PARENTERAL at 05:14

## 2022-03-17 RX ADMIN — INSULIN HUMAN 3 UNITS: 100 INJECTION, SOLUTION PARENTERAL at 11:22

## 2022-03-17 RX ADMIN — SENNOSIDES AND DOCUSATE SODIUM 2 TABLET: 50; 8.6 TABLET ORAL at 05:11

## 2022-03-17 RX ADMIN — LATANOPROST 1 DROP: 50 SOLUTION OPHTHALMIC at 17:11

## 2022-03-17 RX ADMIN — LURASIDONE HYDROCHLORIDE 20 MG: 20 TABLET, FILM COATED ORAL at 17:10

## 2022-03-17 RX ADMIN — RIVAROXABAN 10 MG: 10 TABLET, FILM COATED ORAL at 08:00

## 2022-03-17 ASSESSMENT — PAIN DESCRIPTION - PAIN TYPE
TYPE: ACUTE PAIN
TYPE: ACUTE PAIN

## 2022-03-17 NOTE — CARE PLAN
The patient is Stable - Low risk of patient condition declining or worsening    Shift Goals  Clinical Goals: Discharge planning  Patient Goals: Rest  Family Goals: N/A    Progress made toward(s) clinical / shift goals: Pt medically cleared for discharge, pt does not meet requirements for returning to previous GH, needs reevaluation from Care Management.     Patient is not progressing towards the following goals:      Problem: Discharge Barriers/Planning  Goal: Patient's continuum of care needs are met  Outcome: Not Progressing

## 2022-03-17 NOTE — PROGRESS NOTES
Pt transferred from neuro.   4 Eyes Skin Assessment Completed by OMID Rosenberg and OMID Moody.    Head WDL  Ears WDL  Nose WDL  Mouth WDL  Neck WDL  Breast/Chest WDL  Shoulder Blades WDL  Spine WDL  (R) Arm/Elbow/Hand WDL  (L) Arm/Elbow/Hand WDL  Abdomen WDL  Groin WDL  Scrotum/Coccyx/Buttocks Redness  (R) Leg WDL  (L) Leg WDL  (R) Heel/Foot/Toe WDL  (L) Heel/Foot/Toe WDL          Devices In Places    Interventions In Place Heel Float Boots, Waffle Overlay, Pillows and Barrier Cream    Possible Skin Injury No    Pictures Uploaded Into Epic N/A  Wound Consult Placed N/A  RN Wound Prevention Protocol Ordered No

## 2022-03-17 NOTE — DISCHARGE PLANNING
Anticipated Discharge Disposition:   Pt's mother's house    Action:    Received telephone call from patient's outpatient  with [facility], Sarah Velarde [phone].  Pt no longer meets criteria for placement at a group home.  She has reached out to patient's mother to see if he can live with her.    Barriers to Discharge:    Housing  Mental health    Plan:    F/U with Sarah.

## 2022-03-17 NOTE — CARE PLAN
Problem: Knowledge Deficit - Standard  Goal: Patient and family/care givers will demonstrate understanding of plan of care, disease process/condition, diagnostic tests and medications  Outcome: Progressing     Problem: Skin Integrity  Goal: Skin integrity is maintained or improved  Outcome: Progressing     Problem: Pain - Standard  Goal: Alleviation of pain or a reduction in pain to the patient’s comfort goal  Outcome: Progressing     Problem: Neuro Status  Goal: Neuro status will remain stable or improve  Outcome: Progressing     Problem: Self Care  Goal: Patient will have the ability to perform ADLs independently or with assistance (bathe, groom, dress, toilet and feed)  Outcome: Progressing     Problem: Urinary Elimination  Goal: Establish and maintain regular urinary output  Outcome: Progressing     Problem: Bowel Elimination  Goal: Establish and maintain regular bowel function  Outcome: Progressing     Problem: Respiratory  Goal: Patient will achieve/maintain optimum respiratory ventilation and gas exchange  Outcome: Progressing     Problem: Mobility  Goal: Patient's capacity to carry out activities will improve  Outcome: Progressing   The patient is Stable - Low risk of patient condition declining or worsening    Shift Goals  Clinical Goals: Mobility  Patient Goals: sleep  Family Goals: JUANCARLOS    Progress made toward(s) clinical / shift goals:  Slept well throughout shift. A+Ox4. VSS on RA. R sided weakness. Medicated for back spasm, with relief. Pt turns independently. Awaiting placement.     12hr chart check completed.

## 2022-03-17 NOTE — CONSULTS
"[facility] INPATIENT PSYCHIATRIC CONSULT LIAISON NOTE:     DOS: 03/17/2022    REASON FOR CONSULT: \"Patient states sudden onset right sided weakness. Patient has no effort against gravity for R arm or R leg. Facial droop noted. Patient has stuttered speech and endorses HA\"      HPI: Norm Prabhakar is a 41 year old male patient who presented to the ED on 2/26/2022 with sudden right-sided weakness and speech changes.  PMH of asthma, DM 2, bipolar disorder, hypothyroidism, seizure disorder, depression/anxiety, HTN, dyslipidemia.  Said he was last normal around 6:30 when symptoms began.  This is a chronic issue for him since 2010 with multiple previous negative MRIs.  Current symptoms are similar to prior, says usually improve in a few days days    Patient was seen today as a follow up consult. Reports sleeping very well last night.  Denies SI/HI/AVH. Endorsed mild symptoms of depression because \"I just found out the group home I stay do not want to take me back\" He plans to live with his mother for the time being until he finds his own place.       LEGAL HOLD: Voluntary    ALLERGIES: Abilify, Fish, Geodon [ziprasidone hcl], Aripiprazole     PAST MEDICAL HISTORY:  ASTHMA,  Diabetes ([facility]), Fall, Glaucoma (1982), Hypothyroidism, Indigestion,  Murmur, Pneumonia, S/P thyroidectomy, Seizure (HCC) (2010), and Unspecified disorder of thyroid    PAST PSYCHIATRIC HISTORY:  Bipolar Disorder I.   Depression.   Anxiety    LEGAL HISTORY:  Denies    SOCIAL HISTORY:  Quit smoking 22 month ago. Previous alcohol use.    CURRENT HOSPITAL PROBLEMS:  Acute right-sided weakness   Anxiety and depression   Asthma   Class 1 obesity due to excess calories with serious comorbidity and body mass index (BMI) of 32.0 to 32.9 in adult   Conversion disorder   History of seizure   Mixed hyperlipidemia   Postoperative hypothyroidism   Primary hypertension   PTSD (post-traumatic stress disorder)   Type 2 diabetes mellitus without " "complication, without long-term current use of insulin ([facility])       PSYCHIATRIC EXAMNIATION:  VITALS: WNL    MENTAL STATUS EXAM:  Appearance: Dressed in hospital gown, normal grooming and hygiene, no acute  distress.   Attitude: Calm and cooperative.  Behavior: Sitting up by his bedside, using his laptop  Musculoskeletal:  Right arm movement much better today  Eye Contact: Adequate.  Speech: Stutter, coherent, adequate volume  Affect: Normal  Mood: \"A little depressed\"  Process: Goal-directed, organized. Linear  Content: Negative for Suicidal and Homicidal ideations.  Perception: Negative for auditory and visual hallucinations  Orientation: Oriented to Time, Place, Person and Self.  Memory: No significant deficits elicited  Insight: Fair    LABS:  Unremarkable    CURRENT PSYCHIATRIC MEDICATIONS:  Depakote 1500 mg P.O at HS  Lurasidone 20mg P.O in the evening  Prazosin 4mg P.O every evening  Sertraline 150mg P.O Daily    ASSESSMENT AND PLAN:  -Mood Disorder due to another health conditio- Unspecified  -Functional Neurological disorder  -PTSD-Chronic  -Conversion Disorder (recurrent right sided weakness since 2010, lasts for a few days then gets better. Patient is left handed)    Legal Hold: Voluntary  -No medication changes needed at this time.  -Psych CL will continue following patient while at [facility].  -Medication reconciliation was completed.  -Reviewed safety plan - 911, ER, PCM, MHC, Suicide Crisis Line, Nursing staff while    inpatient.  -Visitors: Yes  -Personal Belongings: Yes  -Observation Level:Tele monitor    -Instruction: Discharge recommendations per treatment team.    "

## 2022-03-17 NOTE — THERAPY
Missed Therapy     Patient Name: Norm Prabhakar  Age:  41 y.o., Sex:  male  Medical Record #: [id number]  Today's Date: 3/17/2022    Pt in shower with CNA will follow up for OT treatment session at a later time as able.

## 2022-03-17 NOTE — PROGRESS NOTES
Assumed care of patient at 0700 from Santiago ANNE. Patient is A&Ox 4, states pain level is 0/10. Bed locked in lowest position with 2 rails up. Call light in place, belongings at bedside. Patient expresses zero needs at this time and hourly rounding is in place. Pt is a low fall risk.
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
## 2022-03-17 NOTE — PROGRESS NOTES
[facility] Medicine Daily Progress Note    Date of Service  3/17/2022    Chief Complaint  Norm Prabhakar is a 41 y.o. male admitted 2/26/2022 with right arm weakness    Hospital Course  A 41-year-old man with h/o conversion disorder, syncope, DM2, hypothyroid, seizure, HTN, PTSD, depression and recurrent R-sided weakness and sluttering (prior normal MRIs) presented with right-sided weakness and speech changes. Presenting  symptoms were similar to prior presentations in recent past.   CT head, CT head perfusion, CTA head and neck in the ED negative for acute abnormality.    Neurology consulted in the ED, conversion disorder suspected.     Psychiatry evaluated the patient - functional neurological disorder was considered. PT/OT evaluated the patient - recommended placement to SNF. Since then, CM/SW team has been working on placement.       Interval Problem Update  No acute events overnight.  Patient has no complaints, reports right sided strength is gradually improving.  Pending group home/NAMS placement. CM aware.      I have personally seen and examined the patient at bedside. I discussed the plan of care with patient, bedside RN, charge RN,  and pharmacy.    Consultants/Specialty  neurology, psychiatry and physiatry    Code Status  Full Code    Disposition  Patient is medically cleared pending placement for discharge.   Anticipate discharge to group home.  I have placed the appropriate orders for post-discharge needs.    Review of Systems  Review of Systems   Constitutional: Negative for chills, fever and malaise/fatigue.   HENT: Negative.    Eyes: Negative.    Respiratory: Positive for cough. Negative for shortness of breath.    Cardiovascular: Negative for chest pain.   Gastrointestinal: Negative for abdominal pain, nausea and vomiting.   Genitourinary: Negative for dysuria.   Musculoskeletal: Negative for myalgias.   Skin: Negative for rash.   Neurological: Negative for headaches.      Physical  Exam  Temp:  [36.3 °C (97.4 °F)-36.5 °C (97.7 °F)] 36.5 °C (97.7 °F)  Pulse:  [54-70] 54  Resp:  [15-18] 15  BP: (112-116)/(68-78) 112/71  SpO2:  [94 %-96 %] 96 %    Physical Exam  Vitals and nursing note reviewed.   Constitutional:       Appearance: He is ill-appearing.   HENT:      Head: Normocephalic.      Nose: Nose normal.      Mouth/Throat:      Mouth: Mucous membranes are moist.   Eyes:      Pupils: Pupils are equal, round, and reactive to light.   Cardiovascular:      Rate and Rhythm: Normal rate and regular rhythm.   Pulmonary:      Effort: Pulmonary effort is normal. No respiratory distress.      Breath sounds: No wheezing or rales.   Abdominal:      General: Abdomen is flat. Bowel sounds are normal. There is no distension.      Tenderness: There is no abdominal tenderness. There is no guarding.   Musculoskeletal:         General: Normal range of motion.      Cervical back: Normal range of motion.   Skin:     General: Skin is warm.   Neurological:      Mental Status: He is alert and oriented to person, place, and time.      Motor: Weakness (right-sided) present.      Comments: Slurred speech     Fluids    Intake/Output Summary (Last 24 hours) at 3/17/2022 1123  Last data filed at 3/17/2022 1008  Gross per 24 hour   Intake 480 ml   Output 1550 ml   Net -1070 ml       Laboratory  Recent Labs     03/16/22  0316   WBC 9.5   RBC 4.73   HEMOGLOBIN 13.8*   HEMATOCRIT 41.6*   MCV 87.9   MCH 29.2   MCHC 33.2*   RDW 43.1   PLATELETCT 256   MPV 9.8     Recent Labs     03/16/22  0316   SODIUM 139   POTASSIUM 4.0   CHLORIDE 102   CO2 24   GLUCOSE 187*   BUN 22   CREATININE 0.78   CALCIUM 9.4                   Imaging  US-EXTREMITY VENOUS UPPER UNILAT RIGHT   Final Result      DX-CHEST-PORTABLE (1 VIEW)   Final Result      No acute cardiopulmonary abnormality.      CT-CTA NECK WITH & W/O-POST PROCESSING   Final Result      1.  No occlusion or hemodynamically significant stenosis of the neck arteries.   2.   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      CT-CTA HEAD WITH & W/O-POST PROCESS   Final Result      CT angiogram of the [place] within normal limits.      CT-CEREBRAL PERFUSION ANALYSIS   Final Result      1.  Cerebral blood flow less than 30% likely representing completed infarct = 0 mL.      2.  T Max more than 6 seconds likely representing combination of completed infarct and ischemia = 0 mL.      3.  Mismatched volume likely representing ischemic brain/penumbra = None      4.  Please note that the cerebral perfusion was performed on the limited brain tissue around the basal ganglia region. Infarct/ischemia outside the CT perfusion sections can be missed in this study.      CT-HEAD W/O   Final Result      1.  Confluent cerebral white matter hypodensities again noted which may represent severe chronic leukoencephalopathy..   2.  No acute intracranial abnormality.              Assessment/Plan  * Acute right-sided weakness- (present on admission)  Assessment & Plan  Chronic recurrent issue, per our records he has had 19 MRI head since 2011 with the majority ordered for right-sided weakness  CT scans in the ED negative for acute abnormality, chronic moderate cerebellar loss  Patient says this is similar to his previous episodes and symptoms usually resolve in a few days or less  Weakness likely due to conversion disorder  Psychiatry following  Patient refused by all SNFs, not a rehab candidate  Medically cleared for discharge to group home, SW working on group home placement  Appeared to have some improvement both RUE and RLE 3-4/5    Conversion disorder- (present on admission)  Assessment & Plan  Recurrent right-sided weakness, admitted again for this  Psych consulted  Currently on  Depakote 1500 mg P.O at HS  Lurasidone 20mg P.O in the evening  Prazosin 4mg P.O every evening  Sertraline 150mg P.O Daily    Primary hypertension- (present on  admission)  Assessment & Plan  Continue lisinopril    Mixed hyperlipidemia- (present on admission)  Assessment & Plan  Continue atorvastatin    Class 1 obesity due to excess calories with serious comorbidity and body mass index (BMI) of 32.0 to 32.9 in adult- (present on admission)  Assessment & Plan  BMI 32.7  Outpatient weight loss counseling    Type 2 diabetes mellitus without complication, without long-term current use of insulin (HCC)- (present on admission)  Assessment & Plan  Hold home orals.  Continue insulin glargine  Insulin sliding scale hypoglycemia protocol in the hospital    PTSD (post-traumatic stress disorder)- (present on admission)  Assessment & Plan  Continue sertraline and prazosin    History of seizure- (present on admission)  Assessment & Plan  Continue divalproex    Anxiety and depression- (present on admission)  Assessment & Plan  Continue sertraline    Postoperative hypothyroidism- (present on admission)  Assessment & Plan  Continue levothyroxine  Thyroid mass, h/o goiter  CT neck shows thyroid mass  TSH 8.1     Asthma- (present on admission)  Assessment & Plan  Continue montelukast and home inhaler       VTE prophylaxis: therapeutic anticoagulation with rivaroxaban    I have performed a physical exam and reviewed and updated ROS and Plan today (3/17/2022). In review of yesterday's note (3/16/2022), there are no changes except as documented above.

## 2022-03-18 LAB
GLUCOSE BLD STRIP.AUTO-MCNC: 160 MG/DL (ref 65–99)
GLUCOSE BLD STRIP.AUTO-MCNC: 167 MG/DL (ref 65–99)
GLUCOSE BLD STRIP.AUTO-MCNC: 169 MG/DL (ref 65–99)
GLUCOSE BLD STRIP.AUTO-MCNC: 215 MG/DL (ref 65–99)
GLUCOSE BLD STRIP.AUTO-MCNC: 235 MG/DL (ref 65–99)

## 2022-03-18 PROCEDURE — 700102 HCHG RX REV CODE 250 W/ 637 OVERRIDE(OP): Performed by: STUDENT IN AN ORGANIZED HEALTH CARE EDUCATION/TRAINING PROGRAM

## 2022-03-18 PROCEDURE — 97535 SELF CARE MNGMENT TRAINING: CPT

## 2022-03-18 PROCEDURE — 97530 THERAPEUTIC ACTIVITIES: CPT

## 2022-03-18 PROCEDURE — G0378 HOSPITAL OBSERVATION PER HR: HCPCS

## 2022-03-18 PROCEDURE — A9270 NON-COVERED ITEM OR SERVICE: HCPCS | Performed by: STUDENT IN AN ORGANIZED HEALTH CARE EDUCATION/TRAINING PROGRAM

## 2022-03-18 PROCEDURE — 96372 THER/PROPH/DIAG INJ SC/IM: CPT | Mod: XU

## 2022-03-18 PROCEDURE — 97116 GAIT TRAINING THERAPY: CPT

## 2022-03-18 PROCEDURE — 82962 GLUCOSE BLOOD TEST: CPT | Mod: 91

## 2022-03-18 PROCEDURE — 99224 PR SUBSEQUENT OBSERVATION CARE,LEVEL I: CPT | Performed by: STUDENT IN AN ORGANIZED HEALTH CARE EDUCATION/TRAINING PROGRAM

## 2022-03-18 RX ADMIN — INSULIN GLARGINE 30 UNITS: 100 INJECTION, SOLUTION SUBCUTANEOUS at 17:01

## 2022-03-18 RX ADMIN — LISINOPRIL 10 MG: 10 TABLET ORAL at 05:48

## 2022-03-18 RX ADMIN — SERTRALINE 150 MG: 50 TABLET, FILM COATED ORAL at 20:54

## 2022-03-18 RX ADMIN — ATORVASTATIN CALCIUM 20 MG: 20 TABLET, FILM COATED ORAL at 17:04

## 2022-03-18 RX ADMIN — BUDESONIDE AND FORMOTEROL FUMARATE DIHYDRATE 2 PUFF: 160; 4.5 AEROSOL RESPIRATORY (INHALATION) at 17:04

## 2022-03-18 RX ADMIN — RIVAROXABAN 10 MG: 10 TABLET, FILM COATED ORAL at 07:45

## 2022-03-18 RX ADMIN — LATANOPROST 1 DROP: 50 SOLUTION OPHTHALMIC at 17:05

## 2022-03-18 RX ADMIN — INSULIN HUMAN 2 UNITS: 100 INJECTION, SOLUTION PARENTERAL at 11:09

## 2022-03-18 RX ADMIN — INSULIN GLARGINE 30 UNITS: 100 INJECTION, SOLUTION SUBCUTANEOUS at 05:51

## 2022-03-18 RX ADMIN — INSULIN HUMAN 3 UNITS: 100 INJECTION, SOLUTION PARENTERAL at 20:57

## 2022-03-18 RX ADMIN — PRAZOSIN HYDROCHLORIDE 4 MG: 2 CAPSULE ORAL at 17:04

## 2022-03-18 RX ADMIN — LURASIDONE HYDROCHLORIDE 20 MG: 20 TABLET, FILM COATED ORAL at 17:04

## 2022-03-18 RX ADMIN — BUDESONIDE AND FORMOTEROL FUMARATE DIHYDRATE 2 PUFF: 160; 4.5 AEROSOL RESPIRATORY (INHALATION) at 05:49

## 2022-03-18 RX ADMIN — INSULIN HUMAN 2 UNITS: 100 INJECTION, SOLUTION PARENTERAL at 16:51

## 2022-03-18 RX ADMIN — INSULIN HUMAN 2 UNITS: 100 INJECTION, SOLUTION PARENTERAL at 05:53

## 2022-03-18 RX ADMIN — SENNOSIDES AND DOCUSATE SODIUM 2 TABLET: 50; 8.6 TABLET ORAL at 17:03

## 2022-03-18 RX ADMIN — DIVALPROEX SODIUM 1500 MG: 500 TABLET, DELAYED RELEASE ORAL at 20:54

## 2022-03-18 RX ADMIN — MONTELUKAST 10 MG: 10 TABLET, FILM COATED ORAL at 17:04

## 2022-03-18 RX ADMIN — LEVOTHYROXINE SODIUM 225 MCG: 0.07 TABLET ORAL at 05:49

## 2022-03-18 ASSESSMENT — LIFESTYLE VARIABLES
EVER HAD A DRINK FIRST THING IN THE MORNING TO STEADY YOUR NERVES TO GET RID OF A HANGOVER: NO
TOTAL SCORE: 3
AVERAGE NUMBER OF DAYS PER WEEK YOU HAVE A DRINK CONTAINING ALCOHOL: 1
HAVE PEOPLE ANNOYED YOU BY CRITICIZING YOUR DRINKING: YES
CONSUMPTION TOTAL: POSITIVE
HOW MANY TIMES IN THE PAST YEAR HAVE YOU HAD 5 OR MORE DRINKS IN A DAY: 12
TOTAL SCORE: 3
EVER FELT BAD OR GUILTY ABOUT YOUR DRINKING: YES
ALCOHOL_USE: YES
ON A TYPICAL DAY WHEN YOU DRINK ALCOHOL HOW MANY DRINKS DO YOU HAVE: 5
TOTAL SCORE: 3
HAVE YOU EVER FELT YOU SHOULD CUT DOWN ON YOUR DRINKING: YES
DOES PATIENT WANT TO STOP DRINKING: NO

## 2022-03-18 ASSESSMENT — COGNITIVE AND FUNCTIONAL STATUS - GENERAL
CLIMB 3 TO 5 STEPS WITH RAILING: A LOT
SUGGESTED CMS G CODE MODIFIER MOBILITY: CK
DAILY ACTIVITIY SCORE: 21
HELP NEEDED FOR BATHING: A LITTLE
SUGGESTED CMS G CODE MODIFIER DAILY ACTIVITY: CJ
WALKING IN HOSPITAL ROOM: A LITTLE
MOVING TO AND FROM BED TO CHAIR: A LITTLE
TOILETING: A LITTLE
DRESSING REGULAR LOWER BODY CLOTHING: A LITTLE
STANDING UP FROM CHAIR USING ARMS: A LITTLE
MOVING FROM LYING ON BACK TO SITTING ON SIDE OF FLAT BED: A LITTLE
MOBILITY SCORE: 18

## 2022-03-18 ASSESSMENT — GAIT ASSESSMENTS
ASSISTIVE DEVICE: FRONT WHEEL WALKER
DISTANCE (FEET): 80
DEVIATION: BRADYKINETIC;DECREASED HEEL STRIKE;DECREASED TOE OFF
GAIT LEVEL OF ASSIST: SUPERVISED

## 2022-03-18 NOTE — THERAPY
Occupational Therapy  Daily Treatment     Patient Name: Norm Prabhakar  Age:  41 y.o., Sex:  male  Medical Record #: [id number]  Today's Date: 3/18/2022     Precautions  Precautions: (P) Fall Risk    Assessment    Pt seen for follow up OT tx session, pt able to complete all ADLs and functional mobility with supervision and use of FWW, pts RUE back at normal strength and patient displayed appropriate use with all functional tasks including full body dressing, typing on his computer and using his cell phone. Anticipate pt is at his functional baseline, will defer further mobility/stair training to PT.    Plan    Discharge secondary to goals met.    DC Equipment Recommendations: (P) None  Discharge Recommendations: (P) Anticipate that the patient will have no further occupational therapy needs after discharge from the hospital    Objective       03/18/22 1140   Precautions   Precautions Fall Risk   Cognition    Comments Pleasant, cooperative   Active ROM Upper Body   Active ROM Upper Body  WDL   Dominant Hand Right   Strength Upper Body   Upper Body Strength  WDL   Comments RUE WFL   Upper Body Muscle Tone   Upper Body Muscle Tone  WDL   Balance   Sitting Balance (Static) Fair +   Sitting Balance (Dynamic) Fair +   Standing Balance (Static) Fair   Standing Balance (Dynamic) Fair   Weight Shift Sitting Fair   Weight Shift Standing Fair   Skilled Intervention Verbal Cuing   Comments w/ FWW   Bed Mobility    Supine to Sit Supervised   Scooting Supervised   Skilled Intervention Verbal Cuing   Activities of Daily Living   Eating Supervision   Grooming Supervision   Upper Body Dressing Supervision   Lower Body Dressing Supervision   Skilled Intervention Verbal Cuing   How much help from another person does the patient currently need...   Putting on and taking off regular lower body clothing? 3   Bathing (including washing, rinsing, and drying)? 3   Toileting, which includes using a toilet, bedpan, or urinal? 3   Putting on  and taking off regular upper body clothing? 4   Taking care of personal grooming such as brushing teeth? 4   Eating meals? 4   6 Clicks Daily Activity Score 21   Functional Mobility   Sit to Stand Standby Assist   Bed, Chair, Wheelchair Transfer Standby Assist   Transfer Method Stand Step   Mobility bed mobility, mobility in room, up to chair   Skilled Intervention Verbal Cuing;Facilitation   Activity Tolerance   Sitting in Chair left seated in chair   Sitting Edge of Bed 15 min   Standing 10 min   Short Term Goals   Short Term Goal # 1 Pt will complete ADL transfers with supervision   Goal Outcome # 1 Goal met   Short Term Goal # 2 Pt will complete LB dressing with supervision   Goal Outcome # 2 Goal met   Short Term Goal # 3 Pt will complete toileting with supervision   Goal Outcome # 3 Goal not met   Short Term Goal # 4 Pt will complete standing G/H with supervision   Goal Outcome # 4 Goal met   Education Group   Education Provided Role of Occupational Therapist   Role of Occupational Therapist Patient Response Patient;Acceptance;Explanation;Action Demonstration   Anticipated Discharge Equipment and Recommendations   DC Equipment Recommendations None   Discharge Recommendations Anticipate that the patient will have no further occupational therapy needs after discharge from the hospital   Interdisciplinary Plan of Care Collaboration   IDT Collaboration with  Nursing   Patient Position at End of Therapy Seated;Chair Alarm On;Call Light within Reach;Phone within Reach;Tray Table within Reach   Collaboration Comments RN updated

## 2022-03-18 NOTE — PROGRESS NOTES
Assumed care at 0700. Patient A&Ox4. Patient denies pain. Call light and belongings within reach. Bed locked and in low position. Hourly rounding in place.

## 2022-03-18 NOTE — PROGRESS NOTES
[facility] Medicine Daily Progress Note    Date of Service  3/18/2022    Chief Complaint  Norm Prabhakar is a 41 y.o. male admitted 2/26/2022 with right arm weakness    Hospital Course  A 41-year-old man with h/o conversion disorder, syncope, DM2, hypothyroid, seizure, HTN, PTSD, depression and recurrent R-sided weakness and sluttering (prior normal MRIs) presented with right-sided weakness and speech changes. Presenting  symptoms were similar to prior presentations in recent past.   CT head, CT head perfusion, CTA head and neck in the ED negative for acute abnormality.    Neurology consulted in the ED, conversion disorder suspected.     Psychiatry evaluated the patient - functional neurological disorder was considered. PT/OT evaluated the patient - recommended placement to SNF. Since then, CM/SW team has been working on placement.       Interval Problem Update  No acute events overnight.  Patient feels well.  Patient seen laying right hand on laptop, moving it without difficulty. Both legs up, bent at knees on bed.  Pending group home/NAMS placement. CM aware.  Patient is medically cleared.    I have personally seen and examined the patient at bedside. I discussed the plan of care with patient, bedside RN, charge RN,  and pharmacy.    Consultants/Specialty  neurology, psychiatry and physiatry    Code Status  Full Code    Disposition  Patient is medically cleared pending placement for discharge.   Anticipate discharge to group home.  I have placed the appropriate orders for post-discharge needs.    Review of Systems  Review of Systems   Constitutional: Negative for chills, fever and malaise/fatigue.   HENT: Negative.    Eyes: Negative.    Respiratory: Positive for cough. Negative for shortness of breath.    Cardiovascular: Negative for chest pain.   Gastrointestinal: Negative for abdominal pain, nausea and vomiting.   Genitourinary: Negative for dysuria.   Musculoskeletal: Negative for myalgias.   Skin:  Negative for rash.   Neurological: Negative for headaches.      Physical Exam  Temp:  [36.2 °C (97.2 °F)-36.7 °C (98 °F)] 36.4 °C (97.5 °F)  Pulse:  [64-79] 68  Resp:  [18] 18  BP: (108-116)/(53-76) 108/53  SpO2:  [91 %-97 %] 97 %    Physical Exam  Vitals and nursing note reviewed.   Constitutional:       Appearance: He is ill-appearing.   HENT:      Head: Normocephalic.      Nose: Nose normal.      Mouth/Throat:      Mouth: Mucous membranes are moist.   Eyes:      Pupils: Pupils are equal, round, and reactive to light.   Cardiovascular:      Rate and Rhythm: Normal rate and regular rhythm.   Pulmonary:      Effort: Pulmonary effort is normal. No respiratory distress.      Breath sounds: No wheezing or rales.   Abdominal:      General: Abdomen is flat. Bowel sounds are normal. There is no distension.      Tenderness: There is no abdominal tenderness. There is no guarding.   Musculoskeletal:         General: Normal range of motion.      Cervical back: Normal range of motion.   Skin:     General: Skin is warm.   Neurological:      Mental Status: He is alert and oriented to person, place, and time.      Motor: Weakness (right-sided) present.      Comments: Slurred speech     Fluids    Intake/Output Summary (Last 24 hours) at 3/18/2022 1019  Last data filed at 3/18/2022 0900  Gross per 24 hour   Intake 1200 ml   Output 900 ml   Net 300 ml       Laboratory  Recent Labs     03/16/22  0316   WBC 9.5   RBC 4.73   HEMOGLOBIN 13.8*   HEMATOCRIT 41.6*   MCV 87.9   MCH 29.2   MCHC 33.2*   RDW 43.1   PLATELETCT 256   MPV 9.8     Recent Labs     03/16/22  0316   SODIUM 139   POTASSIUM 4.0   CHLORIDE 102   CO2 24   GLUCOSE 187*   BUN 22   CREATININE 0.78   CALCIUM 9.4                   Imaging  US-EXTREMITY VENOUS UPPER UNILAT RIGHT   Final Result      DX-CHEST-PORTABLE (1 VIEW)   Final Result      No acute cardiopulmonary abnormality.      CT-CTA NECK WITH & W/O-POST PROCESSING   Final Result      1.  No occlusion or  hemodynamically significant stenosis of the neck arteries.   2.  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      CT-CTA HEAD WITH & W/O-POST PROCESS   Final Result      CT angiogram of the [place] within normal limits.      CT-CEREBRAL PERFUSION ANALYSIS   Final Result      1.  Cerebral blood flow less than 30% likely representing completed infarct = 0 mL.      2.  T Max more than 6 seconds likely representing combination of completed infarct and ischemia = 0 mL.      3.  Mismatched volume likely representing ischemic brain/penumbra = None      4.  Please note that the cerebral perfusion was performed on the limited brain tissue around the basal ganglia region. Infarct/ischemia outside the CT perfusion sections can be missed in this study.      CT-HEAD W/O   Final Result      1.  Confluent cerebral white matter hypodensities again noted which may represent severe chronic leukoencephalopathy..   2.  No acute intracranial abnormality.              Assessment/Plan  * Acute right-sided weakness- (present on admission)  Assessment & Plan  Chronic recurrent issue, per our records he has had 19 MRI head since 2011 with the majority ordered for right-sided weakness  CT scans in the ED negative for acute abnormality, chronic moderate cerebellar loss  Patient says this is similar to his previous episodes and symptoms usually resolve in a few days or less  Weakness likely due to conversion disorder  Psychiatry following  Patient refused by all SNFs, not a rehab candidate  Medically cleared for discharge to group home, SW working on group home placement  Appeared to have some improvement both RUE and RLE 3-4/5    Conversion disorder- (present on admission)  Assessment & Plan  Recurrent right-sided weakness, admitted again for this  Psych consulted  Currently on  Depakote 1500 mg P.O at HS  Lurasidone 20mg P.O in the evening  Prazosin 4mg P.O every evening  Sertraline 150mg  P.O Daily    Primary hypertension- (present on admission)  Assessment & Plan  Continue lisinopril    Mixed hyperlipidemia- (present on admission)  Assessment & Plan  Continue atorvastatin    Class 1 obesity due to excess calories with serious comorbidity and body mass index (BMI) of 32.0 to 32.9 in adult- (present on admission)  Assessment & Plan  BMI 32.7  Outpatient weight loss counseling    Type 2 diabetes mellitus without complication, without long-term current use of insulin (HCC)- (present on admission)  Assessment & Plan  Hold home orals.  Continue insulin glargine  Insulin sliding scale hypoglycemia protocol in the hospital    PTSD (post-traumatic stress disorder)- (present on admission)  Assessment & Plan  Continue sertraline and prazosin    History of seizure- (present on admission)  Assessment & Plan  Continue divalproex    Anxiety and depression- (present on admission)  Assessment & Plan  Continue sertraline    Postoperative hypothyroidism- (present on admission)  Assessment & Plan  Continue levothyroxine  Thyroid mass, h/o goiter  CT neck shows thyroid mass  TSH 8.1     Asthma- (present on admission)  Assessment & Plan  Continue montelukast and home inhaler       VTE prophylaxis: therapeutic anticoagulation with rivaroxaban    I have performed a physical exam and reviewed and updated ROS and Plan today (3/18/2022). In review of yesterday's note (3/17/2022), there are no changes except as documented above.

## 2022-03-18 NOTE — THERAPY
"Physical Therapy   Daily Treatment     Patient Name: Norm Prabhakar  Age:  41 y.o., Sex:  male  Medical Record #: [id number]  Today's Date: 3/18/2022     Precautions  Precautions: Fall Risk    Assessment    Pleasant and cooperative with PT today. Pt demonstrating improvement in RUE and RLE strength and function. Pt able to ambulate with FWW for over 80 ft with SPV for direction on unit. Pt know to the therapist from home care services in the past. Pt appears to be near baseline with mobility. Pt states he is hopeful he can stay with his mother upon discharge. Mother resides in [facility].     Plan    Continue current treatment plan.    DC Equipment Recommendations: None  Discharge Recommendations: Recommend home health for continued physical therapy services (pending clarification pt can stay with his mother in [place] upon discharge.)      Subjective    \" not sure if my mom's place has a step in, I'm staying downstairs\"     Objective       03/18/22 1417   Cognition    Comments pleasant and cooperative. Pt states he can go stay with his mother in [place].   Active ROM Lower Body    Active ROM Lower Body  WDL   Strength Lower Body   Lower Body Strength  WDL   Sitting Lower Body Exercises   Ankle Pumps 1 set of 10;Bilateral   Long Arc Quad 1 set of 10;Bilateral   Standing Lower Body Exercises   Marching 1 set of 10   Toe Rise 1 set of 10   Comments w/FWW   Vision   Vision Comments h/o visual deficits but functional   Balance   Sitting Balance (Static) Good   Sitting Balance (Dynamic) Fair +   Standing Balance (Static) Fair +   Standing Balance (Dynamic) Fair   Weight Shift Sitting Fair   Weight Shift Standing Fair   Comments w/ FWW   Gait Analysis   Gait Level Of Assist Supervised   Assistive Device Front Wheel Walker   Distance (Feet) 80   # of Times Distance was Traveled 1   Deviation Bradykinetic;Decreased Heel Strike;Decreased Toe Off   # of Stairs Climbed 0   Weight Bearing Status no restrictions.   Skilled " Intervention Verbal Cuing   Bed Mobility    Supine to Sit Supervised   Sit to Supine Standby Assist   Scooting Supervised   Comments able to completed w/o physical assistance   Functional Mobility   Sit to Stand Supervised   Bed, Chair, Wheelchair Transfer Supervised   Transfer Method Stand Step   Mobility gait in hallway, up in chair then BTB   Skilled Intervention Verbal Cuing   How much difficulty does the patient currently have...   Turning over in bed (including adjusting bedclothes, sheets and blankets)? 4   Sitting down on and standing up from a chair with arms (e.g., wheelchair, bedside commode, etc.) 3   Moving from lying on back to sitting on the side of the bed? 3   How much help from another person does the patient currently need...   Moving to and from a bed to a chair (including a wheelchair)? 3   Need to walk in a hospital room? 3   Climbing 3-5 steps with a railing? 2   6 clicks Mobility Score 18   Activity Tolerance   Sitting in Chair functional   Sitting Edge of Bed functional   Standing 10 min   Patient / Family Goals    Patient / Family Goal #1 go to moms for awhile   Short Term Goals    Short Term Goal # 1 supine to/sit EOB with mod I in 6 visits   Goal Outcome # 1 Progressing as expected   Short Term Goal # 2 sit to stand from bed/chair/toilet with SBA in 6 visits   Goal Outcome # 2 Progressing as expected   Short Term Goal # 3 ambulation with LRD 50 feet in 6 visits   Goal Outcome # 3 Goal met, new goal added   Short Term Goal # 3 B Pt will ambulate with FWW for 200 ft with SPV by the 6th tx   Short Term Goal # 4 Pt will ascend and descend step to 1 to enter mother's home by the 6th tx   Education Group   Role of Physical Therapist Patient Response Patient;Acceptance;Explanation;Verbal Demonstration   Gait Training Patient Response Acceptance;Explanation;Action Demonstration;Reinforcement Needed   Exercise - Standing Patient Response Patient;Acceptance;Demonstration;Explanation;Reinforcement  Needed;Action Demonstration   Transfer Status Patient Response Patient;Acceptance;Explanation;Reinforcement Needed   Anticipated Discharge Equipment and Recommendations   DC Equipment Recommendations None   Discharge Recommendations Recommend home health for continued physical therapy services  (pending clarification pt can stay with his mother in [place] upon discharge.)

## 2022-03-18 NOTE — CONSULTS
[facility] PSYCHIATRIC FOLLOW-UP:(established)  *Reason for admission: LKW 1900. Patient states sudden onset right sided weakness. Patient has no effort against gravity for R arm or R leg. Facial droop noted. Patient has stuttered speech and endorses HA     *Legal Hold Status: not applicable                S:  Walking down the buckley with PT/OT. Doing well with walker. His therapist says she has worked with his in the past and this appears close to baseline. He agrees. He is doing well, no complaints.    O: Medical ROS (as pertinent):                      *Psychiatric Examination:   Vitals:   Vitals:    03/17/22 1600 03/17/22 1854 03/18/22 0432 03/18/22 0715   BP: 116/76 115/71 112/64 108/53   Pulse: 68 79 64 68   Resp: 18 18 18 18   Temp: 36.7 °C (98 °F) 36.2 °C (97.2 °F) 36.4 °C (97.6 °F) 36.4 °C (97.5 °F)   TempSrc: Temporal Temporal Temporal Temporal   SpO2: 95% 91% 93% 97%   Weight:       Height:            General Appearance:good eye contact  Musculoskeletal:walking slowly down buckley with walker  Alert/Orientation. X 4  Attn/Concentration: intact  Fund of Knowledge:not tested  Memory recent/remote: grossly intact  Speech: stutter like  Language:  fluent  Thought Content: denies psychosis/SI/HI   Thought Process:   linear     Insight/Judgement: impaired  Mood: denies depression, anxiety  Affect: smiling more        Current Medications:  Scheduled Medications   Medication Dose Frequency   • latanoprost  1 Drop Q EVENING   • rivaroxaban  10 mg QDAY with Breakfast   • Pharmacy Consult Request  1 Each PHARMACY TO DOSE   • senna-docusate  2 Tablet BID   • atorvastatin  20 mg Q EVENING   • budesonide-formoterol  2 Puff BID   • divalproex  1,500 mg QHS   • insulin GLARGINE  30 Units BID   • levothyroxine  225 mcg AM ES   • lisinopril  10 mg DAILY   • lurasidone  20 mg Q EVENING   • montelukast  10 mg Q EVENING   • prazosin  4 mg Q EVENING   • sertraline  150 mg DAILY   • insulin regular  2-9 Units  "4X/DAY ACHS          *ASSESSMENT/RECOMENDATIONS:  1.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 Mood disorder unspc: hx of. Stable  - to include \"lonliness and boredom\"        3. R/O intellectual limitations  -  learning disability, special education  -speech cog: 3/11: moderate problems in attn. Otherwise unremarkable but \"..Pt has assistance w/ medication management and other IADLs in his group home. No further acute SLP needs are indicated at this time, though pt will benefit from either home health or outpatient SLP tx to address his exacerbated fluency disorder.\"  -limited reliability     4. PTSD: hx of: stable     5.  Medical  -as per 2/3/2021:  \"has been seen multiple times by psychiatry. He has a documented SZ disorder, likely intellectual disability and features of a conversion disorder.\"  -HTN  -DM II   -Hx of SZ disorder:depakote     -hypothyroidism    Legal hold: not applicable     Medication and Other Recommendations: final orders as per Tx Tm  1. No changes    Will continue to follow with you.     Discharge recommendations: per tx tm     "

## 2022-03-18 NOTE — CARE PLAN
The patient is Stable - Low risk of patient condition declining or worsening    Shift Goals  Clinical Goals: BSG control, d/c planning  Patient Goals: rest  Family Goals: NA    Progress made toward(s) clinical / shift goals:  PT educated on BSG and poc. Pt skin is CDI. Pt denies pain. Pt neuro status is stable.       Problem: Knowledge Deficit - Standard  Goal: Patient and family/care givers will demonstrate understanding of plan of care, disease process/condition, diagnostic tests and medications  Outcome: Progressing     Problem: Skin Integrity  Goal: Skin integrity is maintained or improved  Outcome: Progressing     Problem: Pain - Standard  Goal: Alleviation of pain or a reduction in pain to the patient’s comfort goal  Outcome: Progressing     Problem: Neuro Status  Goal: Neuro status will remain stable or improve  Outcome: Progressing       Patient is not progressing towards the following goals:

## 2022-03-19 LAB
GLUCOSE BLD STRIP.AUTO-MCNC: 126 MG/DL (ref 65–99)
GLUCOSE BLD STRIP.AUTO-MCNC: 142 MG/DL (ref 65–99)
GLUCOSE BLD STRIP.AUTO-MCNC: 217 MG/DL (ref 65–99)
GLUCOSE BLD STRIP.AUTO-MCNC: 220 MG/DL (ref 65–99)

## 2022-03-19 PROCEDURE — 700102 HCHG RX REV CODE 250 W/ 637 OVERRIDE(OP): Performed by: STUDENT IN AN ORGANIZED HEALTH CARE EDUCATION/TRAINING PROGRAM

## 2022-03-19 PROCEDURE — 82962 GLUCOSE BLOOD TEST: CPT | Mod: 91

## 2022-03-19 PROCEDURE — G0378 HOSPITAL OBSERVATION PER HR: HCPCS

## 2022-03-19 PROCEDURE — A9270 NON-COVERED ITEM OR SERVICE: HCPCS | Performed by: STUDENT IN AN ORGANIZED HEALTH CARE EDUCATION/TRAINING PROGRAM

## 2022-03-19 PROCEDURE — 96372 THER/PROPH/DIAG INJ SC/IM: CPT | Mod: XU

## 2022-03-19 PROCEDURE — 99224 PR SUBSEQUENT OBSERVATION CARE,LEVEL I: CPT | Performed by: STUDENT IN AN ORGANIZED HEALTH CARE EDUCATION/TRAINING PROGRAM

## 2022-03-19 RX ADMIN — DIVALPROEX SODIUM 1500 MG: 500 TABLET, DELAYED RELEASE ORAL at 20:22

## 2022-03-19 RX ADMIN — LATANOPROST 1 DROP: 50 SOLUTION OPHTHALMIC at 17:32

## 2022-03-19 RX ADMIN — BUDESONIDE AND FORMOTEROL FUMARATE DIHYDRATE 2 PUFF: 160; 4.5 AEROSOL RESPIRATORY (INHALATION) at 05:43

## 2022-03-19 RX ADMIN — INSULIN HUMAN 3 UNITS: 100 INJECTION, SOLUTION PARENTERAL at 12:27

## 2022-03-19 RX ADMIN — LISINOPRIL 10 MG: 10 TABLET ORAL at 05:43

## 2022-03-19 RX ADMIN — INSULIN GLARGINE 30 UNITS: 100 INJECTION, SOLUTION SUBCUTANEOUS at 17:32

## 2022-03-19 RX ADMIN — PRAZOSIN HYDROCHLORIDE 4 MG: 2 CAPSULE ORAL at 17:32

## 2022-03-19 RX ADMIN — RIVAROXABAN 10 MG: 10 TABLET, FILM COATED ORAL at 10:13

## 2022-03-19 RX ADMIN — BUDESONIDE AND FORMOTEROL FUMARATE DIHYDRATE 2 PUFF: 160; 4.5 AEROSOL RESPIRATORY (INHALATION) at 17:32

## 2022-03-19 RX ADMIN — LURASIDONE HYDROCHLORIDE 20 MG: 20 TABLET, FILM COATED ORAL at 17:32

## 2022-03-19 RX ADMIN — INSULIN GLARGINE 30 UNITS: 100 INJECTION, SOLUTION SUBCUTANEOUS at 05:43

## 2022-03-19 RX ADMIN — INSULIN HUMAN 3 UNITS: 100 INJECTION, SOLUTION PARENTERAL at 20:22

## 2022-03-19 RX ADMIN — LEVOTHYROXINE SODIUM 225 MCG: 0.07 TABLET ORAL at 05:43

## 2022-03-19 RX ADMIN — ATORVASTATIN CALCIUM 20 MG: 20 TABLET, FILM COATED ORAL at 17:32

## 2022-03-19 RX ADMIN — MONTELUKAST 10 MG: 10 TABLET, FILM COATED ORAL at 17:32

## 2022-03-19 RX ADMIN — SERTRALINE 150 MG: 50 TABLET, FILM COATED ORAL at 20:21

## 2022-03-19 ASSESSMENT — FIBROSIS 4 INDEX: FIB4 SCORE: 0.69

## 2022-03-19 NOTE — CARE PLAN
The patient is Stable - Low risk of patient condition declining or worsening    Shift Goals  Clinical Goals: neuro checks  Patient Goals: to go home  Family Goals: n/a    Progress made toward(s) clinical / shift goals:  vitals and neuro status are stable    Patient is not progressing towards the following goals:

## 2022-03-19 NOTE — CONSULTS
"  [facility] PSYCHIATRIC FOLLOW-UP:(established)  *Reason for admission:   LKW 1900. Patient states sudden onset right sided weakness. Patient has no effort against gravity for R arm or R leg. Facial droop noted. Patient has stuttered speech and endorses HA     *Legal Hold Status: not applicable                S:  He is \"90%\" recovered physically and \"doing good\" mentally/emotionally. Says he will be staying with his mom for a few days until he can find a place on his own.    O: Medical ROS (as pertinent):                      *Psychiatric Examination:   Vitals:   Vitals:    03/18/22 1950 03/19/22 0400 03/19/22 0715 03/19/22 1100   BP: 113/70 112/78 116/81    Pulse: 73 64 68    Resp: 20 20 18    Temp: 36.9 °C (98.4 °F) 36.7 °C (98.1 °F)     TempSrc: Temporal Temporal     SpO2: 93% 92% 94%    Weight:    123 kg (270 lb 8.1 oz)   Height:            General Appearance:good eye contact  Musculoskeletal:walking slowly down buckley with walker  Alert/Orientation. intact  Attn/Concentration: intact  Fund of Knowledge:not tested  Memory recent/remote: grossly intact  Speech: wnl  Language:  fluent  Thought Content: denies psychosis/SI/HI   Thought Process:   linear     Insight/Judgement: impaired  Mood: denies depression, anxiety  Affect: smiling, euthymic     Current Medications:  Scheduled Medications   Medication Dose Frequency   • latanoprost  1 Drop Q EVENING   • rivaroxaban  10 mg QDAY with Breakfast   • Pharmacy Consult Request  1 Each PHARMACY TO DOSE   • senna-docusate  2 Tablet BID   • atorvastatin  20 mg Q EVENING   • budesonide-formoterol  2 Puff BID   • divalproex  1,500 mg QHS   • insulin GLARGINE  30 Units BID   • levothyroxine  225 mcg AM ES   • lisinopril  10 mg DAILY   • lurasidone  20 mg Q EVENING   • montelukast  10 mg Q EVENING   • prazosin  4 mg Q EVENING   • sertraline  150 mg DAILY   • insulin regular  2-9 Units 4X/DAY ACHS        *ASSESSMENT/RECOMENDATIONS:  1.1Functional neurological " "disorder: highly likely and improving     2. Mood disorder unspc: hx of. Stable  - to include \"lonliness and boredom\"        3. R/O intellectual limitations  -  learning disability, special education  -speech cog: 3/11: moderate problems in attn. Otherwise unremarkable but \"..Pt has assistance w/ medication management and other IADLs in his group home. No further acute SLP needs are indicated at this time, though pt will benefit from either home health or outpatient SLP tx to address his exacerbated fluency disorder.\"  -limited reliability     4. PTSD: hx of: stable     5.  Medical  -as per 2/3/2021:  \"has been seen multiple times by psychiatry. He has a documented SZ disorder, likely intellectual disability and features of a conversion disorder.\"  -HTN  -DM II   -Hx of SZ disorder:depakote     -hypothyroidism    Legal hold: not applicable   observation  -no sitter needed     Medication and Other Recommendations: final orders as per Tx Tm  1. No changes     Will continue to follow with you.     Discharge recommendations: per tx tm      "

## 2022-03-19 NOTE — CARE PLAN
The patient is Stable - Low risk of patient condition declining or worsening    Shift Goals  Clinical Goals: neuro checks  Patient Goals: rest  Family Goals: n/a    Progress made toward(s) clinical / shift goals:    Problem: Knowledge Deficit - Standard  Goal: Patient and family/care givers will demonstrate understanding of plan of care, disease process/condition, diagnostic tests and medications  Outcome: Progressing     Problem: Skin Integrity  Goal: Skin integrity is maintained or improved  Outcome: Progressing     Problem: Pain - Standard  Goal: Alleviation of pain or a reduction in pain to the patient’s comfort goal  Outcome: Progressing     Problem: Neuro Status  Goal: Neuro status will remain stable or improve  Outcome: Progressing     Problem: Mobility  Goal: Patient's capacity to carry out activities will improve  Outcome: Progressing       Patient is not progressing towards the following goals:

## 2022-03-19 NOTE — PROGRESS NOTES
[facility] Medicine Daily Progress Note    Date of Service  3/19/2022    Chief Complaint  Norm Prabhakar is a 41 y.o. male admitted 2/26/2022 with right arm weakness    Hospital Course  A 41-year-old man with h/o conversion disorder, syncope, DM2, hypothyroid, seizure, HTN, PTSD, depression and recurrent R-sided weakness and sluttering (prior normal MRIs) presented with right-sided weakness and speech changes. Presenting  symptoms were similar to prior presentations in recent past.   CT head, CT head perfusion, CTA head and neck in the ED negative for acute abnormality.    Neurology consulted in the ED, conversion disorder suspected.     Psychiatry evaluated the patient - functional neurological disorder was considered. PT/OT evaluated the patient - recommended placement to SNF. Since then, CM/SW team has been working on placement.       Interval Problem Update  No acute events overnight.  Patient feels well.  Patient no longer qualifies for [facility].  Plan to discharge to home with mother.   Spoke with mother Leslie on the phone, she is agreeable to caring for patient at home. She is getting an apartment ready right under her own apartment. She is getting furniture moved in today and has to go to work now, but can come  patient tomorrow morning 10AM.  Plan to discharge patient home with mother tomorrow 3/20.    I have personally seen and examined the patient at bedside. I discussed the plan of care with patient, bedside RN, charge RN,  and pharmacy.    Consultants/Specialty  neurology, psychiatry and physiatry    Code Status  Full Code    Disposition  Patient is medically cleared for discharge.   Anticipate discharge to home with family.  I have placed the appropriate orders for post-discharge needs.    Review of Systems  Review of Systems   Constitutional: Negative for chills, fever and malaise/fatigue.   HENT: Negative.    Eyes: Negative.    Respiratory: Positive for cough. Negative for  shortness of breath.    Cardiovascular: Negative for chest pain.   Gastrointestinal: Negative for abdominal pain, nausea and vomiting.   Genitourinary: Negative for dysuria.   Musculoskeletal: Negative for myalgias.   Skin: Negative for rash.   Neurological: Negative for headaches.      Physical Exam  Temp:  [36.7 °C (98 °F)-36.9 °C (98.4 °F)] 36.7 °C (98.1 °F)  Pulse:  [64-73] 68  Resp:  [16-20] 18  BP: (112-118)/(70-81) 116/81  SpO2:  [92 %-95 %] 94 %    Physical Exam  Vitals and nursing note reviewed.   Constitutional:       Appearance: He is not ill appearing   HENT:      Head: Normocephalic.      Nose: Nose normal.      Mouth/Throat:      Mouth: Mucous membranes are moist.   Eyes:      Pupils: Pupils are equal, round, and reactive to light.   Cardiovascular:      Rate and Rhythm: Normal rate and regular rhythm.   Pulmonary:      Effort: Pulmonary effort is normal. No respiratory distress.      Breath sounds: No wheezing or rales.   Abdominal:      General: Abdomen is flat. Bowel sounds are normal. There is no distension.      Tenderness: There is no abdominal tenderness. There is no guarding.   Musculoskeletal:         General: Normal range of motion.      Cervical back: Normal range of motion.   Skin:     General: Skin is warm.   Neurological:      Mental Status: He is alert and oriented to person, place, and time.      Motor:  no weakness present.     Fluids    Intake/Output Summary (Last 24 hours) at 3/19/2022 0918  Last data filed at 3/19/2022 0400  Gross per 24 hour   Intake 400 ml   Output 775 ml   Net -375 ml       Laboratory                        Imaging  US-EXTREMITY VENOUS UPPER UNILAT RIGHT   Final Result      DX-CHEST-PORTABLE (1 VIEW)   Final Result      No acute cardiopulmonary abnormality.      CT-CTA NECK WITH & W/O-POST PROCESSING   Final Result      1.  No occlusion or hemodynamically significant stenosis of the neck arteries.   2.  Redemonstrated enhancing masses in the anterior neck,  possibly ectopic thyroid tissue.      CT-CTA HEAD WITH & W/O-POST PROCESS   Final Result      CT angiogram of the [place] within normal limits.      CT-CEREBRAL PERFUSION ANALYSIS   Final Result      1.  Cerebral blood flow less than 30% likely representing completed infarct = 0 mL.      2.  T Max more than 6 seconds likely representing combination of completed infarct and ischemia = 0 mL.      3.  Mismatched volume likely representing ischemic brain/penumbra = None      4.  Please note that the cerebral perfusion was performed on the limited brain tissue around the basal ganglia region. Infarct/ischemia outside the CT perfusion sections can be missed in this study.      CT-HEAD W/O   Final Result      1.  Confluent cerebral white matter hypodensities again noted which may represent severe chronic leukoencephalopathy..   2.  No acute intracranial abnormality.              Assessment/Plan  * Acute right-sided weakness- (present on admission)  Assessment & Plan  Chronic recurrent issue, per our records he has had 19 MRI head since 2011 with the majority ordered for right-sided weakness  CT scans in the ED negative for acute abnormality, chronic moderate cerebellar loss  Patient says this is similar to his previous episodes and symptoms usually resolve in a few days or less  Weakness likely due to conversion disorder  Psychiatry following  Patient refused by all SNFs, not a rehab candidate  [facility] not taking patient back, patient does not qualify for [facility] placement  Plan for patient to discharge to home with mother. CM aware, trying to get in contact with mother regarding discharge planning.    Conversion disorder- (present on admission)  Assessment & Plan  Recurrent right-sided weakness, admitted again for this  Psych consulted  Currently on  Depakote 1500 mg P.O at HS  Lurasidone 20mg P.O in the evening  Prazosin 4mg P.O every evening  Sertraline 150mg P.O Daily    Primary hypertension- (present on  admission)  Assessment & Plan  Continue lisinopril    Mixed hyperlipidemia- (present on admission)  Assessment & Plan  Continue atorvastatin    Class 1 obesity due to excess calories with serious comorbidity and body mass index (BMI) of 32.0 to 32.9 in adult- (present on admission)  Assessment & Plan  BMI 32.7  Outpatient weight loss counseling    Type 2 diabetes mellitus without complication, without long-term current use of insulin (HCC)- (present on admission)  Assessment & Plan  Hold home orals.  Continue insulin glargine  Insulin sliding scale hypoglycemia protocol in the hospital    PTSD (post-traumatic stress disorder)- (present on admission)  Assessment & Plan  Continue sertraline and prazosin    History of seizure- (present on admission)  Assessment & Plan  Continue divalproex    Anxiety and depression- (present on admission)  Assessment & Plan  Continue sertraline    Postoperative hypothyroidism- (present on admission)  Assessment & Plan  Continue levothyroxine  Thyroid mass, h/o goiter  CT neck shows thyroid mass  TSH 8.1     Asthma- (present on admission)  Assessment & Plan  Continue montelukast and home inhaler       VTE prophylaxis: therapeutic anticoagulation with rivaroxaban    I have performed a physical exam and reviewed and updated ROS and Plan today (3/19/2022). In review of yesterday's note (3/18/2022), there are no changes except as documented above.

## 2022-03-19 NOTE — CARE PLAN
The patient is Stable - Low risk of patient condition declining or worsening    Shift Goals  Clinical Goals: neuro checks  Patient Goals: go home  Family Goals: n/a    Progress made toward(s) clinical / shift goals:    Problem: Skin Integrity  Goal: Skin integrity is maintained or improved  Outcome: Progressing     Problem: Neuro Status  Goal: Neuro status will remain stable or improve  Outcome: Progressing  Note: Normal neuro status.  Continue to monitor.       Problem: Self Care  Goal: Patient will have the ability to perform ADLs independently or with assistance (bathe, groom, dress, toilet and feed)  Outcome: Progressing     Problem: Mobility  Goal: Patient's capacity to carry out activities will improve  Outcome: Progressing       Patient is not progressing towards the following goals:

## 2022-03-20 VITALS
OXYGEN SATURATION: 98 % | RESPIRATION RATE: 16 BRPM | HEIGHT: 78 IN | BODY MASS INDEX: 31.3 KG/M2 | SYSTOLIC BLOOD PRESSURE: 110 MMHG | TEMPERATURE: 97.3 F | DIASTOLIC BLOOD PRESSURE: 60 MMHG | HEART RATE: 64 BPM | WEIGHT: 270.5 LBS

## 2022-03-20 LAB — GLUCOSE BLD STRIP.AUTO-MCNC: 155 MG/DL (ref 65–99)

## 2022-03-20 PROCEDURE — 700102 HCHG RX REV CODE 250 W/ 637 OVERRIDE(OP): Performed by: STUDENT IN AN ORGANIZED HEALTH CARE EDUCATION/TRAINING PROGRAM

## 2022-03-20 PROCEDURE — 99217 PR OBSERVATION CARE DISCHARGE: CPT | Performed by: STUDENT IN AN ORGANIZED HEALTH CARE EDUCATION/TRAINING PROGRAM

## 2022-03-20 PROCEDURE — A9270 NON-COVERED ITEM OR SERVICE: HCPCS | Performed by: STUDENT IN AN ORGANIZED HEALTH CARE EDUCATION/TRAINING PROGRAM

## 2022-03-20 PROCEDURE — G0378 HOSPITAL OBSERVATION PER HR: HCPCS

## 2022-03-20 PROCEDURE — 82962 GLUCOSE BLOOD TEST: CPT

## 2022-03-20 PROCEDURE — 96372 THER/PROPH/DIAG INJ SC/IM: CPT | Mod: XU

## 2022-03-20 RX ORDER — LEVOTHYROXINE SODIUM 0.2 MG/1
200 TABLET ORAL
Qty: 30 TABLET | Refills: 0 | Status: ON HOLD | OUTPATIENT
Start: 2022-03-20 | End: 2023-03-10 | Stop reason: SDUPTHER

## 2022-03-20 RX ORDER — LISINOPRIL 10 MG/1
10 TABLET ORAL DAILY
Qty: 30 TABLET | Refills: 0 | Status: ON HOLD | OUTPATIENT
Start: 2022-03-20 | End: 2023-03-10

## 2022-03-20 RX ORDER — ATORVASTATIN CALCIUM 20 MG/1
20 TABLET, FILM COATED ORAL EVERY EVENING
Qty: 30 TABLET | Refills: 0 | Status: ON HOLD | OUTPATIENT
Start: 2022-03-20 | End: 2023-03-10 | Stop reason: SDUPTHER

## 2022-03-20 RX ORDER — EMPAGLIFLOZIN 25 MG/1
25 TABLET, FILM COATED ORAL DAILY
Qty: 30 TABLET | Refills: 0 | Status: ON HOLD | OUTPATIENT
Start: 2022-03-20 | End: 2023-03-10

## 2022-03-20 RX ORDER — LEVOTHYROXINE SODIUM 0.03 MG/1
25 TABLET ORAL
Qty: 30 TABLET | Refills: 0 | Status: ON HOLD | OUTPATIENT
Start: 2022-03-20 | End: 2023-03-10 | Stop reason: SDUPTHER

## 2022-03-20 RX ORDER — MONTELUKAST SODIUM 10 MG/1
10 TABLET ORAL EVERY EVENING
Qty: 30 TABLET | Refills: 0 | Status: ON HOLD | OUTPATIENT
Start: 2022-03-20 | End: 2023-03-10 | Stop reason: SDUPTHER

## 2022-03-20 RX ORDER — PRAZOSIN HYDROCHLORIDE 2 MG/1
2 CAPSULE ORAL EVERY EVENING
Qty: 30 CAPSULE | Refills: 3 | Status: ON HOLD | OUTPATIENT
Start: 2022-03-20 | End: 2023-03-10 | Stop reason: SDUPTHER

## 2022-03-20 RX ORDER — LURASIDONE HYDROCHLORIDE 20 MG/1
20 TABLET, FILM COATED ORAL EVERY EVENING
Qty: 60 TABLET | Refills: 0 | Status: ON HOLD | OUTPATIENT
Start: 2022-03-20 | End: 2023-06-11 | Stop reason: SDUPTHER

## 2022-03-20 RX ORDER — SERTRALINE HYDROCHLORIDE 100 MG/1
150 TABLET, FILM COATED ORAL
Qty: 30 TABLET | Refills: 11 | Status: ON HOLD | OUTPATIENT
Start: 2022-03-20 | End: 2023-03-10 | Stop reason: SDUPTHER

## 2022-03-20 RX ORDER — FENOFIBRATE 134 MG/1
134 CAPSULE ORAL DAILY
Qty: 30 CAPSULE | Refills: 0 | Status: ON HOLD | OUTPATIENT
Start: 2022-03-20 | End: 2023-03-10 | Stop reason: SDUPTHER

## 2022-03-20 RX ORDER — BUDESONIDE AND FORMOTEROL FUMARATE DIHYDRATE 160; 4.5 UG/1; UG/1
2 AEROSOL RESPIRATORY (INHALATION) 2 TIMES DAILY
Qty: 6 G | Refills: 0 | Status: ON HOLD | OUTPATIENT
Start: 2022-03-20 | End: 2023-03-10 | Stop reason: SDUPTHER

## 2022-03-20 RX ORDER — DIVALPROEX SODIUM 500 MG/1
1500 TABLET, DELAYED RELEASE ORAL
Qty: 90 TABLET | Refills: 0 | Status: ON HOLD | OUTPATIENT
Start: 2022-03-20 | End: 2023-03-10 | Stop reason: SDUPTHER

## 2022-03-20 RX ADMIN — INSULIN GLARGINE 30 UNITS: 100 INJECTION, SOLUTION SUBCUTANEOUS at 05:29

## 2022-03-20 RX ADMIN — SENNOSIDES AND DOCUSATE SODIUM 2 TABLET: 50; 8.6 TABLET ORAL at 05:29

## 2022-03-20 RX ADMIN — INSULIN HUMAN 2 UNITS: 100 INJECTION, SOLUTION PARENTERAL at 05:29

## 2022-03-20 RX ADMIN — LEVOTHYROXINE SODIUM 225 MCG: 0.07 TABLET ORAL at 05:28

## 2022-03-20 RX ADMIN — LISINOPRIL 10 MG: 10 TABLET ORAL at 05:29

## 2022-03-20 RX ADMIN — RIVAROXABAN 10 MG: 10 TABLET, FILM COATED ORAL at 09:20

## 2022-03-20 RX ADMIN — BUDESONIDE AND FORMOTEROL FUMARATE DIHYDRATE 2 PUFF: 160; 4.5 AEROSOL RESPIRATORY (INHALATION) at 05:28

## 2022-03-20 NOTE — CARE PLAN
The patient is Stable - Low risk of patient condition declining or worsening    Shift Goals  Clinical Goals: neuro checks  Patient Goals: to go home  Family Goals: n/a    Progress made toward(s) clinical / shift goals:  vitals and pain are stable    Patient is not progressing towards the following goals:

## 2022-03-20 NOTE — DISCHARGE SUMMARY
Discharge Summary    CHIEF COMPLAINT ON ADMISSION  Chief Complaint   Patient presents with   • Possible Stroke     LKW 1900. Patient states sudden onset right sided weakness. Patient has no effort against gravity for R arm or R leg. Facial droop noted. Patient has stuttered speech and endorses HA       Reason for Admission  Stroke     Admission Date  2/26/2022    CODE STATUS  Full Code    HPI & HOSPITAL COURSE  A 41-year-old man with h/o conversion disorder, syncope, DM2, hypothyroid, seizure, HTN, PTSD, depression and recurrent R-sided weakness and sluttering (prior normal MRIs) presented with right-sided weakness and speech changes. Presenting  symptoms were similar to prior presentations of conversion disorder in recent past.   CT head, CT head perfusion, CTA head and neck in the ED negative for acute abnormality.    Neurology consulted in the ED, conversion disorder suspected.     Psychiatry evaluated the patient - functional neurological disorder is most likely diagnosis. PT/OT evaluated the patient - recommended placement to SNF. Since then, CM/SW team has been working on placement. Unfortunately, patient is on do not admit list by all SNF's, therefore was declined. Patient rehabbed while hospitalized and is at functional baseline with improved right sided weakness. Patient's group home prior to hospitalization no longer taking patient back due to lack of needs. Patient to live with mother on discharge while looking for his own place to live. Patient is doing well, he is to follow up with his PCP.      Therefore, he is discharged in good and stable condition to home with close outpatient follow-up.      Discharge Date  3/20/2022    FOLLOW UP ITEMS POST DISCHARGE  Take medications as prescribed.  Follow up with PCP.    DISCHARGE DIAGNOSES  Principal Problem:    Acute right-sided weakness POA: Yes  Active Problems:    Asthma POA: Yes    Postoperative hypothyroidism POA: Yes    Anxiety and depression POA: Yes     History of seizure POA: Yes    PTSD (post-traumatic stress disorder) POA: Yes      Overview: IMO load March 2020    Type 2 diabetes mellitus without complication, without long-term current use of insulin (HCC) POA: Yes    Class 1 obesity due to excess calories with serious comorbidity and body mass index (BMI) of 32.0 to 32.9 in adult POA: Yes    Mixed hyperlipidemia POA: Yes    Primary hypertension POA: Yes    Conversion disorder POA: Yes  Resolved Problems:    * No resolved hospital problems. *      FOLLOW UP  Future Appointments   Date Time Provider Department Center   5/20/2022 10:40 AM Brandon Delatorre M.D. [facility] None     [facility]  [address]  [facility] [address] [phone]    Call  hospital follow up      MEDICATIONS ON DISCHARGE     Medication List      CHANGE how you take these medications      Instructions   * levothyroxine 200 MCG Tabs  What changed: Another medication with the same name was changed. Make sure you understand how and when to take each.  Commonly known as: SYNTHROID   Take 1 Tablet by mouth every morning on an empty stomach. Take in addition to 25 mcg tablet for daily dose of 225 mcg  Dose: 200 mcg     * levothyroxine 25 MCG Tabs  What changed: how much to take  Commonly known as: SYNTHROID   Take 1 Tablet by mouth every morning on an empty stomach. Take in addition to 200 mcg tablet for daily dose of 225 mcg  Dose: 25 mcg     prazosin 2 MG Caps  What changed: how much to take  Commonly known as: MINIPRESS   Take 1 Capsule by mouth every evening. 2 caps = 4 mg  Dose: 2 mg         * This list has 2 medication(s) that are the same as other medications prescribed for you. Read the directions carefully, and ask your doctor or other care provider to review them with you.            CONTINUE taking these medications      Instructions   atorvastatin 20 MG Tabs  Commonly known as: LIPITOR   Take 1 Tablet by mouth every evening.  Dose: 20 mg     budesonide-formoterol 160-4.5  "MCG/ACT Aero  Commonly known as: SYMBICORT   Inhale 2 Puffs 2 times a day.  Dose: 2 Puff     divalproex 500 MG Tbec  Commonly known as: DEPAKOTE   Take 3 Tablets by mouth at bedtime. 3 tablets = 1500 mg every evening  Dose: 1,500 mg     docusate sodium 100 MG Caps  Commonly known as: COLACE   Take 100 mg by mouth every day.  Dose: 100 mg     Fenofibrate 134 MG Caps   Take 134 mg by mouth every day.  Dose: 134 mg     fish oil 1000 MG Caps capsule   Take 1,000 mg by mouth 2 Times a Day.  Dose: 1,000 mg     insulin glargine 100 UNIT/ML Sopn injection  Generic drug: insulin glargine   Inject 30 Units under the skin 2 times a day.  Dose: 30 Units     Jardiance 25 MG Tabs  Generic drug: Empagliflozin   Take 25 mg by mouth every day.  Dose: 25 mg     lisinopril 10 MG Tabs  Commonly known as: PRINIVIL   Take 1 Tablet by mouth every day.  Dose: 10 mg     lurasidone 20 MG Tabs  Commonly known as: LATUDA   Take 1 Tablet by mouth every evening.  Dose: 20 mg     metformin 1000 MG tablet  Commonly known as: GLUCOPHAGE   Take 1 Tablet by mouth 2 times a day with meals.  Dose: 1,000 mg     montelukast 10 MG Tabs  Commonly known as: SINGULAIR   Take 1 Tablet by mouth every evening.  Dose: 10 mg     sertraline 100 MG Tabs  Commonly known as: Zoloft   Take 1.5 Tablets by mouth at bedtime. 1.5 tablets = 150 mg  Dose: 150 mg     SITagliptin 100 MG Tabs  Commonly known as: JANUVIA   Take 1 Tablet by mouth every day.  Dose: 100 mg     Vitamin D3 2000 UNIT Caps   Take 4,000 Units by mouth every evening.  Dose: 4,000 Units            Allergies  Allergies   Allergen Reactions   • Abilify      \"Feeling tired, like I don't even know whats going on around me\"   • Fish      Pt reports fish causes him to be sick to his stomach  Not listed on MAR noted 2/3/2021     • Geodon [Ziprasidone Hcl] Anaphylaxis     Anaphylaxis per patient   • Aripiprazole      \"I became lethargic.\"   • Ziprasidone        DIET  Orders Placed This Encounter   Procedures   • " Diet Order Diet: Consistent CHO (Diabetic)     Standing Status:   Standing     Number of Occurrences:   1     Order Specific Question:   Diet:     Answer:   Consistent CHO (Diabetic) [4]       ACTIVITY  As tolerated.  Weight bearing as tolerated    CONSULTATIONS  Neurology  Psychiatry  Physical medicine and rehab    PROCEDURES  none    LABORATORY  Lab Results   Component Value Date    SODIUM 139 03/16/2022    POTASSIUM 4.0 03/16/2022    CHLORIDE 102 03/16/2022    CO2 24 03/16/2022    GLUCOSE 187 (H) 03/16/2022    BUN 22 03/16/2022    CREATININE 0.78 03/16/2022        Lab Results   Component Value Date    WBC 9.5 03/16/2022    HEMOGLOBIN 13.8 (L) 03/16/2022    HEMATOCRIT 41.6 (L) 03/16/2022    PLATELETCT 256 03/16/2022        Total time of the discharge process exceeds 34 minutes.

## 2022-03-20 NOTE — CARE PLAN
The patient is Stable - Low risk of patient condition declining or worsening    Shift Goals  Clinical Goals: d/c with mom  Patient Goals: d/c  Family Goals: n/a    Progress made toward(s) clinical / shift goals:    Problem: Knowledge Deficit - Standard  Goal: Patient and family/care givers will demonstrate understanding of plan of care, disease process/condition, diagnostic tests and medications  Outcome: Met     Problem: Skin Integrity  Goal: Skin integrity is maintained or improved  Outcome: Met     Problem: Pain - Standard  Goal: Alleviation of pain or a reduction in pain to the patient’s comfort goal  Outcome: Met     Problem: Neuro Status  Goal: Neuro status will remain stable or improve  Outcome: Met     Problem: Self Care  Goal: Patient will have the ability to perform ADLs independently or with assistance (bathe, groom, dress, toilet and feed)  Outcome: Met     Problem: Urinary Elimination  Goal: Establish and maintain regular urinary output  Outcome: Met     Problem: Bowel Elimination  Goal: Establish and maintain regular bowel function  Outcome: Met     Problem: Respiratory  Goal: Patient will achieve/maintain optimum respiratory ventilation and gas exchange  Outcome: Met     Problem: Mobility  Goal: Patient's capacity to carry out activities will improve  Outcome: Met     Problem: Discharge Barriers/Planning  Goal: Patient's continuum of care needs are met  Outcome: Met       Patient is not progressing towards the following goals:

## 2022-06-29 ENCOUNTER — HOSPITAL ENCOUNTER (OUTPATIENT)
Dept: RADIOLOGY | Facility: MEDICAL CENTER | Age: 42
End: 2022-06-29
Payer: MEDICAID

## 2022-11-14 NOTE — RESPIRATORY CARE
" COPD EDUCATION by COPD CLINICAL EDUCATOR  9/1/2020 at 9:58 AM by Nany Mejia, RRT     Patient reviewed by COPD education team. Patient does not have a history or diagnosis of COPD. Patient is a smoker, smoking cessation education done. A comprehensive packet with tips to quit and information on outpatient classes given to patient. Smoking Cessation Intervention and education completed,15 minutes spent on smoking cessation education with patient    Provided smoking cessation packet with \"Tips to Quit\" and flyer for \"Free Smoking Cessation Classes\".       Provided spacer with instruction for use, care, and cleaning.  " Modify Regimen: Nystatin, mupirocin PRN x flares Detail Level: Simple Render In Strict Bullet Format?: No

## 2023-01-02 NOTE — ASSESSMENT & PLAN NOTE
Reason for Visit: f/u on urinary symptoms    Clinical data:  Ms. Wilson is a 45 year old female with hx of recurrent utis.  She is on vaginal estrogen now now.  She also has a hx of breast cancer and is sp double mastectomy and is s/o xrt and chemo.  She is Letrozole, estrogen blocker started in April 2022.  She was previously on another estrogen blocker.    She was using coconut oil in the vaginal area as well and that helps.  She has been doing very well with the prophylaxis when swimming and has  Not had any infections.    She had a sling on 12/12/22 for stress urinary incontinence, but then developed a uti.  She was treated with antibiotics but her symptoms never really went away but improved.          Assessment & Plan   44 y/o female with recurrent uti's in the setting of atrophic vaginitis and hx of breast cancer s/p xrt and chemotherapy. She is doing very well on prophylaxis and would like to continue.  However she did have some infection and perhaps a yeast infection.  We discussed empirical treatment.    -diflucan 150 mg X 3  -if symptoms do not improve then consider a DNA sequencing test  -continue to use cococnut oil prn  -continue with vagifem  -continue macrodantin 50 mg prn intercourse or swimming in [place].  -stay well hydrated  -f/u in a couple weeks in person for exam, already scheduled.    Sixto Harris MD  [facility]      ==========================      Additional Coding Information:  Time spent:  24 minutes spent on the date of the encounter doing chart review, history and exam, documentation and further activities per the note           This is chronic and stable, continue home Synthroid.

## 2023-02-27 ENCOUNTER — HOSPITAL ENCOUNTER (INPATIENT)
Facility: REHABILITATION | Age: 43
LOS: 12 days | DRG: 101 | End: 2023-03-11
Attending: PHYSICAL MEDICINE & REHABILITATION | Admitting: PHYSICAL MEDICINE & REHABILITATION
Payer: MEDICAID

## 2023-02-27 DIAGNOSIS — I63.00 CEREBROVASCULAR ACCIDENT (CVA) DUE TO THROMBOSIS OF PRECEREBRAL ARTERY (HCC): ICD-10-CM

## 2023-02-27 DIAGNOSIS — E89.0 HX OF THYROIDECTOMY: ICD-10-CM

## 2023-02-27 DIAGNOSIS — J45.909 UNCOMPLICATED ASTHMA, UNSPECIFIED ASTHMA SEVERITY, UNSPECIFIED WHETHER PERSISTENT: ICD-10-CM

## 2023-02-27 DIAGNOSIS — H40.9 GLAUCOMA, UNSPECIFIED GLAUCOMA TYPE, UNSPECIFIED LATERALITY: ICD-10-CM

## 2023-02-27 DIAGNOSIS — F41.9 ANXIETY AND DEPRESSION: ICD-10-CM

## 2023-02-27 DIAGNOSIS — E11.9 TYPE 2 DIABETES MELLITUS WITHOUT COMPLICATION, WITHOUT LONG-TERM CURRENT USE OF INSULIN (HCC): ICD-10-CM

## 2023-02-27 DIAGNOSIS — E78.5 DYSLIPIDEMIA: ICD-10-CM

## 2023-02-27 DIAGNOSIS — Z87.898 HISTORY OF SEIZURE: ICD-10-CM

## 2023-02-27 DIAGNOSIS — R53.1 ACUTE RIGHT-SIDED WEAKNESS: ICD-10-CM

## 2023-02-27 DIAGNOSIS — F99 PSYCHIATRIC PROBLEM: ICD-10-CM

## 2023-02-27 DIAGNOSIS — F32.A ANXIETY AND DEPRESSION: ICD-10-CM

## 2023-02-27 DIAGNOSIS — I10 PRIMARY HYPERTENSION: ICD-10-CM

## 2023-02-27 DIAGNOSIS — R51.9 ACUTE NONINTRACTABLE HEADACHE, UNSPECIFIED HEADACHE TYPE: ICD-10-CM

## 2023-02-27 DIAGNOSIS — J44.9 CHRONIC OBSTRUCTIVE PULMONARY DISEASE, UNSPECIFIED COPD TYPE (HCC): ICD-10-CM

## 2023-02-27 LAB — GLUCOSE BLD STRIP.AUTO-MCNC: 159 MG/DL (ref 65–99)

## 2023-02-27 PROCEDURE — 94760 N-INVAS EAR/PLS OXIMETRY 1: CPT

## 2023-02-27 PROCEDURE — 99223 1ST HOSP IP/OBS HIGH 75: CPT | Performed by: PHYSICAL MEDICINE & REHABILITATION

## 2023-02-27 PROCEDURE — 82962 GLUCOSE BLOOD TEST: CPT

## 2023-02-27 PROCEDURE — 700102 HCHG RX REV CODE 250 W/ 637 OVERRIDE(OP): Performed by: PHYSICAL MEDICINE & REHABILITATION

## 2023-02-27 PROCEDURE — A9270 NON-COVERED ITEM OR SERVICE: HCPCS | Performed by: PHYSICAL MEDICINE & REHABILITATION

## 2023-02-27 PROCEDURE — 770010 HCHG ROOM/CARE - REHAB SEMI PRIVAT*

## 2023-02-27 RX ORDER — FENOFIBRATE 67 MG/1
134 CAPSULE ORAL DAILY
Status: DISCONTINUED | OUTPATIENT
Start: 2023-02-28 | End: 2023-03-11 | Stop reason: HOSPADM

## 2023-02-27 RX ORDER — ONDANSETRON 4 MG/1
4 TABLET, ORALLY DISINTEGRATING ORAL 4 TIMES DAILY PRN
Status: DISCONTINUED | OUTPATIENT
Start: 2023-02-27 | End: 2023-03-11 | Stop reason: HOSPADM

## 2023-02-27 RX ORDER — ECHINACEA PURPUREA EXTRACT 125 MG
2 TABLET ORAL PRN
Status: DISCONTINUED | OUTPATIENT
Start: 2023-02-27 | End: 2023-03-11 | Stop reason: HOSPADM

## 2023-02-27 RX ORDER — TRAZODONE HYDROCHLORIDE 50 MG/1
50 TABLET ORAL
Status: DISCONTINUED | OUTPATIENT
Start: 2023-02-27 | End: 2023-03-11 | Stop reason: HOSPADM

## 2023-02-27 RX ORDER — MIDAZOLAM HYDROCHLORIDE 5 MG/ML
5 INJECTION INTRAMUSCULAR; INTRAVENOUS PRN
Status: DISCONTINUED | OUTPATIENT
Start: 2023-02-27 | End: 2023-03-11 | Stop reason: HOSPADM

## 2023-02-27 RX ORDER — ONDANSETRON 2 MG/ML
4 INJECTION INTRAMUSCULAR; INTRAVENOUS 4 TIMES DAILY PRN
Status: DISCONTINUED | OUTPATIENT
Start: 2023-02-27 | End: 2023-03-11 | Stop reason: HOSPADM

## 2023-02-27 RX ORDER — HYDRALAZINE HYDROCHLORIDE 25 MG/1
25 TABLET, FILM COATED ORAL EVERY 8 HOURS PRN
Status: DISCONTINUED | OUTPATIENT
Start: 2023-02-27 | End: 2023-03-11 | Stop reason: HOSPADM

## 2023-02-27 RX ORDER — DORZOLAMIDE HYDROCHLORIDE AND TIMOLOL MALEATE 20; 5 MG/ML; MG/ML
1 SOLUTION/ DROPS OPHTHALMIC 2 TIMES DAILY
Status: DISCONTINUED | OUTPATIENT
Start: 2023-02-27 | End: 2023-03-11 | Stop reason: HOSPADM

## 2023-02-27 RX ORDER — MONTELUKAST SODIUM 10 MG/1
10 TABLET ORAL EVERY EVENING
Status: DISCONTINUED | OUTPATIENT
Start: 2023-02-27 | End: 2023-03-11 | Stop reason: HOSPADM

## 2023-02-27 RX ORDER — TOPIRAMATE 25 MG/1
50 TABLET ORAL EVERY 12 HOURS
Status: DISCONTINUED | OUTPATIENT
Start: 2023-02-27 | End: 2023-03-11 | Stop reason: HOSPADM

## 2023-02-27 RX ORDER — BUDESONIDE AND FORMOTEROL FUMARATE DIHYDRATE 160; 4.5 UG/1; UG/1
2 AEROSOL RESPIRATORY (INHALATION)
Status: DISCONTINUED | OUTPATIENT
Start: 2023-02-27 | End: 2023-03-11 | Stop reason: HOSPADM

## 2023-02-27 RX ORDER — ACETAMINOPHEN 325 MG/1
650 TABLET ORAL EVERY 4 HOURS PRN
Status: DISCONTINUED | OUTPATIENT
Start: 2023-02-27 | End: 2023-03-11 | Stop reason: HOSPADM

## 2023-02-27 RX ORDER — ALUMINA, MAGNESIA, AND SIMETHICONE 2400; 2400; 240 MG/30ML; MG/30ML; MG/30ML
20 SUSPENSION ORAL
Status: DISCONTINUED | OUTPATIENT
Start: 2023-02-27 | End: 2023-03-11 | Stop reason: HOSPADM

## 2023-02-27 RX ORDER — DIVALPROEX SODIUM 500 MG/1
1500 TABLET, DELAYED RELEASE ORAL
Status: DISCONTINUED | OUTPATIENT
Start: 2023-02-27 | End: 2023-03-11 | Stop reason: HOSPADM

## 2023-02-27 RX ORDER — OMEPRAZOLE 20 MG/1
20 CAPSULE, DELAYED RELEASE ORAL DAILY
Status: DISCONTINUED | OUTPATIENT
Start: 2023-02-27 | End: 2023-03-11 | Stop reason: HOSPADM

## 2023-02-27 RX ORDER — DAPAGLIFLOZIN 10 MG/1
10 TABLET, FILM COATED ORAL DAILY
Status: DISCONTINUED | OUTPATIENT
Start: 2023-02-28 | End: 2023-03-01

## 2023-02-27 RX ORDER — PRAZOSIN HYDROCHLORIDE 5 MG/1
5 CAPSULE ORAL EVERY EVENING
Status: DISCONTINUED | OUTPATIENT
Start: 2023-02-27 | End: 2023-03-11 | Stop reason: HOSPADM

## 2023-02-27 RX ORDER — LISINOPRIL 5 MG/1
10 TABLET ORAL DAILY
Status: DISCONTINUED | OUTPATIENT
Start: 2023-02-28 | End: 2023-03-01

## 2023-02-27 RX ORDER — DEXTROSE MONOHYDRATE 25 G/50ML
25 INJECTION, SOLUTION INTRAVENOUS
Status: DISCONTINUED | OUTPATIENT
Start: 2023-02-27 | End: 2023-02-28

## 2023-02-27 RX ORDER — LORATADINE 10 MG/1
10 TABLET ORAL DAILY
Status: DISCONTINUED | OUTPATIENT
Start: 2023-02-28 | End: 2023-03-11 | Stop reason: HOSPADM

## 2023-02-27 RX ORDER — TRAZODONE HYDROCHLORIDE 100 MG/1
100 TABLET ORAL
Status: DISCONTINUED | OUTPATIENT
Start: 2023-02-27 | End: 2023-03-11 | Stop reason: HOSPADM

## 2023-02-27 RX ORDER — LEVETIRACETAM 500 MG/1
1000 TABLET ORAL EVERY 12 HOURS
Status: DISCONTINUED | OUTPATIENT
Start: 2023-02-27 | End: 2023-03-11 | Stop reason: HOSPADM

## 2023-02-27 RX ORDER — ENOXAPARIN SODIUM 100 MG/ML
40 INJECTION SUBCUTANEOUS DAILY
Status: DISCONTINUED | OUTPATIENT
Start: 2023-02-28 | End: 2023-03-10

## 2023-02-27 RX ORDER — BISACODYL 10 MG
10 SUPPOSITORY, RECTAL RECTAL
Status: DISCONTINUED | OUTPATIENT
Start: 2023-02-27 | End: 2023-03-11 | Stop reason: HOSPADM

## 2023-02-27 RX ORDER — AMOXICILLIN 250 MG
2 CAPSULE ORAL 2 TIMES DAILY
Status: DISCONTINUED | OUTPATIENT
Start: 2023-02-27 | End: 2023-03-11 | Stop reason: HOSPADM

## 2023-02-27 RX ORDER — ALBUTEROL SULFATE 90 UG/1
2 AEROSOL, METERED RESPIRATORY (INHALATION) 4 TIMES DAILY PRN
Status: DISCONTINUED | OUTPATIENT
Start: 2023-02-27 | End: 2023-03-11 | Stop reason: HOSPADM

## 2023-02-27 RX ORDER — LEVOTHYROXINE SODIUM 0.03 MG/1
25 TABLET ORAL
Status: DISCONTINUED | OUTPATIENT
Start: 2023-02-28 | End: 2023-03-11 | Stop reason: HOSPADM

## 2023-02-27 RX ORDER — TRAMADOL HYDROCHLORIDE 50 MG/1
50 TABLET ORAL EVERY 4 HOURS PRN
Status: DISCONTINUED | OUTPATIENT
Start: 2023-02-27 | End: 2023-03-11 | Stop reason: HOSPADM

## 2023-02-27 RX ORDER — ATORVASTATIN CALCIUM 10 MG/1
20 TABLET, FILM COATED ORAL EVERY EVENING
Status: DISCONTINUED | OUTPATIENT
Start: 2023-02-27 | End: 2023-03-11 | Stop reason: HOSPADM

## 2023-02-27 RX ORDER — VITAMIN B COMPLEX
4000 TABLET ORAL EVERY EVENING
Status: DISCONTINUED | OUTPATIENT
Start: 2023-02-27 | End: 2023-03-11 | Stop reason: HOSPADM

## 2023-02-27 RX ORDER — LURASIDONE HYDROCHLORIDE 20 MG/1
80 TABLET, FILM COATED ORAL
Status: DISCONTINUED | OUTPATIENT
Start: 2023-02-27 | End: 2023-03-03

## 2023-02-27 RX ORDER — DIVALPROEX SODIUM 500 MG/1
500 TABLET, DELAYED RELEASE ORAL DAILY
Status: DISCONTINUED | OUTPATIENT
Start: 2023-02-28 | End: 2023-03-11 | Stop reason: HOSPADM

## 2023-02-27 RX ORDER — POLYETHYLENE GLYCOL 3350 17 G/17G
1 POWDER, FOR SOLUTION ORAL
Status: DISCONTINUED | OUTPATIENT
Start: 2023-02-27 | End: 2023-03-11 | Stop reason: HOSPADM

## 2023-02-27 RX ADMIN — TOPIRAMATE 50 MG: 25 TABLET, FILM COATED ORAL at 21:37

## 2023-02-27 RX ADMIN — SERTRALINE HYDROCHLORIDE 150 MG: 50 TABLET, FILM COATED ORAL at 21:36

## 2023-02-27 RX ADMIN — TRAZODONE HYDROCHLORIDE 100 MG: 100 TABLET ORAL at 21:36

## 2023-02-27 RX ADMIN — PRAZOSIN HYDROCHLORIDE 5 MG: 5 CAPSULE ORAL at 21:35

## 2023-02-27 RX ADMIN — MONTELUKAST 10 MG: 10 TABLET, FILM COATED ORAL at 21:38

## 2023-02-27 RX ADMIN — DORZOLAMIDE HYDROCHLORIDE AND TIMOLOL MALEATE 1 DROP: 20; 5 SOLUTION/ DROPS OPHTHALMIC at 21:50

## 2023-02-27 RX ADMIN — INSULIN GLARGINE-YFGN 30 UNITS: 100 INJECTION, SOLUTION SUBCUTANEOUS at 21:52

## 2023-02-27 RX ADMIN — Medication 4000 UNITS: at 21:38

## 2023-02-27 RX ADMIN — ATORVASTATIN CALCIUM 20 MG: 10 TABLET, FILM COATED ORAL at 21:36

## 2023-02-27 RX ADMIN — SENNOSIDES AND DOCUSATE SODIUM 2 TABLET: 50; 8.6 TABLET ORAL at 21:37

## 2023-02-27 RX ADMIN — METFORMIN HYDROCHLORIDE 1000 MG: 500 TABLET ORAL at 17:47

## 2023-02-27 RX ADMIN — LEVETIRACETAM 1000 MG: 500 TABLET, FILM COATED ORAL at 21:34

## 2023-02-27 RX ADMIN — OMEPRAZOLE 20 MG: 20 CAPSULE, DELAYED RELEASE ORAL at 17:47

## 2023-02-27 RX ADMIN — LURASIDONE HYDROCHLORIDE 80 MG: 20 TABLET, FILM COATED ORAL at 17:46

## 2023-02-27 RX ADMIN — DIVALPROEX SODIUM 1500 MG: 500 TABLET, DELAYED RELEASE ORAL at 21:35

## 2023-02-27 RX ADMIN — INSULIN HUMAN 1 UNITS: 100 INJECTION, SOLUTION PARENTERAL at 17:46

## 2023-02-27 ASSESSMENT — LIFESTYLE VARIABLES
TOTAL SCORE: 0
CONSUMPTION TOTAL: NEGATIVE
EVER HAD A DRINK FIRST THING IN THE MORNING TO STEADY YOUR NERVES TO GET RID OF A HANGOVER: NO
TOTAL SCORE: 0
ALCOHOL_USE: YES
AVERAGE NUMBER OF DAYS PER WEEK YOU HAVE A DRINK CONTAINING ALCOHOL: 0
ON A TYPICAL DAY WHEN YOU DRINK ALCOHOL HOW MANY DRINKS DO YOU HAVE: 0
HAVE PEOPLE ANNOYED YOU BY CRITICIZING YOUR DRINKING: NO
HAVE YOU EVER FELT YOU SHOULD CUT DOWN ON YOUR DRINKING: NO
EVER FELT BAD OR GUILTY ABOUT YOUR DRINKING: NO
HOW MANY TIMES IN THE PAST YEAR HAVE YOU HAD 5 OR MORE DRINKS IN A DAY: 0
TOTAL SCORE: 0

## 2023-02-27 ASSESSMENT — PATIENT HEALTH QUESTIONNAIRE - PHQ9
SUM OF ALL RESPONSES TO PHQ9 QUESTIONS 1 AND 2: 0
1. LITTLE INTEREST OR PLEASURE IN DOING THINGS: NOT AT ALL
2. FEELING DOWN, DEPRESSED, IRRITABLE, OR HOPELESS: NOT AT ALL
SUM OF ALL RESPONSES TO PHQ9 QUESTIONS 1 AND 2: 0
1. LITTLE INTEREST OR PLEASURE IN DOING THINGS: NOT AT ALL
SUM OF ALL RESPONSES TO PHQ9 QUESTIONS 1 AND 2: 0
1. LITTLE INTEREST OR PLEASURE IN DOING THINGS: NOT AT ALL

## 2023-02-27 ASSESSMENT — FIBROSIS 4 INDEX: FIB4 SCORE: 0.61

## 2023-02-27 NOTE — CARE PLAN
The patient is Watcher - Medium risk of patient condition declining or worsening     Shift Goals  Clinical Goals: safety     Problem: Knowledge Deficit - Standard  Goal: Patient and family/care givers will demonstrate understanding of plan of care, disease process/condition, diagnostic tests and medications  Note: Patient is a new admit. Educated on safety precautions and fall prevention measures. Covid testing was completed at [facility] this am. Negative PCR results.

## 2023-02-27 NOTE — PROGRESS NOTES
Patient admitted to facility at 1240 via stretcher; accompanied by hospital transport.  Patient assisted to room and positioned in bed for comfort and safety; call light within reach.  Patient assisted with stowing belongings and oriented to room and facility.  Admission assessment performed and documented in computer.  Received and reviewed education binder. Pt is a high fall risk-secondary to seizure activity and frequent fall at home.  Primary nurse made aware to place of seizure precautions and charge nurse made aware high fall risk.  Pt education reinforced to ring for help and not get out of bed without assistance.  Admission paperwork completed; signed copies placed in chart.  Will continue to monitor.

## 2023-02-27 NOTE — H&P
Physical Medicine & Rehabilitation  History and Physical (H&P)  &     Post Admission Physician Evaluation (DANE)       Date of Admission: 2/27/2023  Date of Service: 2/27/2023   Norm Prabhakar  [birth year]    [facility] Code to Support Admission: 0002.1 - Brain Dysfunction: Non-Traumatic  Etiologic Diagnosis: Seizure disorder (HCC)    _______________________________________________    Chief Complaint: Decreased mobility, right sided weakness    History of Present Illness:  Patient is a 42 y.o. male with a PMH of TBI, seizures, conversion disorder, PTSD, BPD and right sided weakness with previous CVA who presented on 2/17/23 with multiple seizure. He reportedly had 4 separate seizures. CT head was negative for acute process. MRI was negative. He reports missed some of his medication at home. He was started on Keppra in addition to his home medications and has since not had a seizure in 10 days. He was found to have large neck mass and biopsy was performed with pending pathology.      Patient tolerated transfer to [facility]. He reports he does remember being here before. He denies any recent seizures now that he is on keppra. He reports worsened right sided weakness which is improving. Denies any psychosocial issues at home. He has a [place] [employer] puppy that he is looking forward to seeing. Denies SOB.     Review of Systems:     Comprehensive 14 point ROS was reviewed and all were negative except as noted elsewhere in this document.     Past Medical History:  Past Medical History:   Diagnosis Date    Anxiety     BIPOLAR    ASTHMA     Bipolar 1 disorder ([facility])     Depression     Diabetes ([facility])     Type II Diabetes    Fall     passed out 2 wks ago    Glaucoma     Glaucoma 1982    both eyes/ blind on left eye    Hypothyroidism     Indigestion     once in a while    Mental disorder     learning disabilities; speech impairment; developmental delays    Murmur     since birth    Pneumonia     remote    Psychiatric problem 2002    PTSD     S/P thyroidectomy     Seizure (HCC) 2010    Seizure disorder (HCC)     Unspecified disorder of thyroid        Past Surgical History:  Past Surgical History:   Procedure Laterality Date    EYE SURGERY      OTHER      Hernia Repair when he was 10 yrs old    THYROID LOBECTOMY         Family History:  Family History   Problem Relation Age of Onset    Hypertension Mother     Heart Disease Mother     Lung Disease Mother     Stroke Maternal Grandmother        Medications:  Current Facility-Administered Medications   Medication Dose    Respiratory Therapy Consult      hydrALAZINE (APRESOLINE) tablet 25 mg  25 mg    acetaminophen (Tylenol) tablet 650 mg  650 mg    senna-docusate (PERICOLACE or SENOKOT S) 8.6-50 MG per tablet 2 Tablet  2 Tablet    And    polyethylene glycol/lytes (MIRALAX) PACKET 1 Packet  1 Packet    And    magnesium hydroxide (MILK OF MAGNESIA) suspension 30 mL  30 mL    And    bisacodyl (DULCOLAX) suppository 10 mg  10 mg    omeprazole (PRILOSEC) capsule 20 mg  20 mg    mag hydrox-al hydrox-simeth (MAALOX PLUS ES or MYLANTA DS) suspension 20 mL  20 mL    ondansetron (ZOFRAN ODT) dispertab 4 mg  4 mg    Or    ondansetron (ZOFRAN) syringe/vial injection 4 mg  4 mg    traZODone (DESYREL) tablet 50 mg  50 mg    sodium chloride (OCEAN) 0.65 % nasal spray 2 Spray  2 Spray    traMADol (Ultram) 50 MG tablet 50 mg  50 mg    midazolam (VERSED) 5 mg/mL (1 mL vial)  5 mg    atorvastatin (LIPITOR) tablet 20 mg  20 mg    [START ON 2/28/2023] levothyroxine (SYNTHROID) tablet 200 mcg  200 mcg    [START ON 2/28/2023] levothyroxine (SYNTHROID) tablet 25 mcg  25 mcg    vitamin D3 (cholecalciferol) tablet 4,000 Units  4,000 Units    divalproex (DEPAKOTE) delayed-release tablet 1,500 mg  1,500 mg    [START ON 2/28/2023] fenofibrate micronized (LOFIBRA) capsule 134 mg  134 mg    Empagliflozin TABS 25 mg  25 mg    insulin GLARGINE (Lantus,Semglee) injection  30 Units    [START ON 2/28/2023] lisinopril (PRINIVIL) tablet 10 mg   "10 mg    lurasidone (LATUDA) tablet 80 mg  80 mg    metFORMIN (GLUCOPHAGE) tablet 1,000 mg  1,000 mg    montelukast (SINGULAIR) tablet 10 mg  10 mg    prazosin (MINIPRESS) capsule 5 mg  5 mg    sertraline (Zoloft) tablet 150 mg  150 mg    [START ON 2/28/2023] SITagliptin (JANUVIA) tablet 100 mg  100 mg    levETIRAcetam (KEPPRA) tablet 1,000 mg  1,000 mg    [START ON 2/28/2023] divalproex (DEPAKOTE) delayed-release tablet 500 mg  500 mg    [START ON 2/28/2023] loratadine (CLARITIN) tablet 10 mg  10 mg    topiramate (TOPAMAX) tablet 50 mg  50 mg    traZODone (DESYREL) tablet 100 mg  100 mg    dorzolamide-timolol (COSOPT) 22.3-6.8 MG/ML ophthalmic drops 1 Drop  1 Drop    insulin regular (HumuLIN R,NovoLIN R) injection  1-6 Units    And    dextrose 50% (D50W) injection 25 g  25 g       Allergies:  Abilify, Fish, Geodon [ziprasidone hcl], Aripiprazole, and Ziprasidone    Psychosocial History:  Living Site:  Home  Living With:  family  Caregiver's availability:  Not Applicable  Number of stairs:  Not Applicable    Level of Function Prior to Disability:  Ind    Level of Function Prior to Admission to [facility]:  Jero for mobility  Jero for ADLs    CURRENT LEVEL OF FUNCTION:   Same as level of function prior to admission to [facility]    Physical Examination:     VITAL SIGNS:   height is 1.981 m (6' 6\") and weight is 109 kg (240 lb). His oral temperature is 36.6 °C (97.9 °F). His blood pressure is 117/66 and his pulse is 72. His respiration is 16.   GENERAL: No apparent distress  HEENT: EOMI and PERRL  CARDIAC: Regular rate and rhythm, normal S1, S2   LUNGS: Clear to auscultation   ABDOMINAL: bowel sounds present, soft, and nontender    EXTREMITIES: no contractures, spasticity, or edema.    NEURO:  Mental status:  A&Ox4 (person, place, date, situation) answers questions appropriately  Speech: fluent, no aphasia or dysarthria  Motor:    At least 3/5 RUE when talking but upon exam no " movement at fingers, 5/5 LUE  2/5 RLE again leg moves under cover antigravity but not on testing  Sensory: intact to light touch through out  DTRs: 2+ in bilateral biceps and patellar tendons    Radiology:  See HPI    Laboratory Values:                Primary Rehabilitation Diagnosis:    This patient is a 42 y.o. male admitted for acute inpatient rehabilitation with Seizure disorder (HCC).    Impairments:   Cognitive  ADLs/IADLs  Mobility    Secondary Diagnosis/Medical Co-morbidities Affecting Function  HTN  DM2 with hyperglycemia  HLD  Glaucoma  BPD/Mood disorder  Hypothyroidism  Asthma    Relevant Changes Since Preadmission Evaluation:    Status unchanged    The patient's rehabilitation potential is Good  The patient's medical prognosis is good    Rehabilitation Plan:   Discussion and Recommendations:   1. The patient requires an acute inpatient rehabilitation program with a coordinated program of care at an intensity and frequency not available at a lower level of care. This recommendation is substantiated by the patient's medical physicians who recommend that the patient's intervention and assessment of medical issues needs to be done at an acute level of care for patient's safety and maximum outcome.   2. A coordinated program of care will be supplied by an interdisciplinary team of physical therapy, occupational therapy, rehab physician, rehab nursing, and, if needed, speech therapy and rehab psychology. Rehab team presents a patient-specific rehabilitation and education program concentrating on prevention of future problems related to accessibility, mobility, skin, bowel, bladder, sexuality, and psychosocial and medical/surgical problems.   3. Need for Rehabilitation Physician: The rehab physician will be evaluating the patient on a multi-weekly basis to help coordinate the program of care. The rehab physician communicates between medical physicians, therapists, and nurses to maximize the patient's potential  outcome. Specific areas in which the rehab physician will be providing daily assessment include the following:   A. Assessing the patient's heart rate and blood pressure response (vitals monitoring) to activity and making adjustments in medications or conservative measures as needed.   B. The rehab physician will be assessing the frequency at which the program can be increased to allow the patient to reach optimal functional outcome.   C. The rehab physician will also provide assessments in daily skin care, especially in light of patient's impairments in mobility.   D. The rehab physician will provide special expertise in understanding how to work with functional impairment and recommend appropriate interventions, compensatory techniques, and education that will facilitate the patient's outcome.   4. Rehab R.N.   The rehab RN will be working with patient to carry over in room mobility and activities of daily living when the patient is not in 3 hours of skilled therapy. Rehab nursing will be working in conjunction with rehab physician to address all the medical issues above and continue to assess laboratory work and discuss abnormalities with the treating physicians, assess vitals, and response to activity, and discuss and report abnormalities with the rehab physician. Rehab RN will also continue daily skin care, supervise bladder/bowel program, instruct in medication administration, and ensure patient safety.   5. Rehab Therapy: Therapies to treat at intensity and frequency of (may change after completion of evaluation by all therapeutic disciplines):       PT:  Physical therapy to address mobility, transfer, gait training and evaluation for adaptive equipment needs 1 hour/day at least 5 days/week for the duration of the ELOS (see below)       OT:  Occupational therapy to address ADLs, self-care, home management training, functional mobility/transfers and assistive device evaluation, and community re-integration 1   hour/day at least 5 days/week for the duration of the ELOS (see below).        ST/Dysphagia:  Speech therapy to address speech, language, and cognitive deficits as well as swallowing difficulties with retraining/dysphagia management and community re-integration with comprehension, expression, cognitive training 1  hour/day at least 5 days/week for the duration of the ELOS (see below).     Medical management / Rehabilitation Issues/ Adverse Potential as part of rehabilitation plan     Rehabilitation Issues/Adverse Potential  1.  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 Patient demonstrates functional deficits in strength, balance, coordination, and ADL's. Patient is admitted to [facility] for comprehensive rehabilitation therapy as described below.   Rehabilitation nursing monitors bowel and bladder control, educates on medication administration, co-morbidities and monitors patient safety.    2.  Neurostimulants: None at this time but continue to assess daily for need to initiate should status change.    3.  DVT prophylaxis:  Patient is on Lovenox for anticoagulation upon transfer. Encourage OOB. Monitor daily for signs and symptoms of DVT including but not limited to swelling and pain to prevent the development of DVT that may interfere with therapies.    4.  GI prophylaxis:  On prilosec to prevent gastritis/dyspepsia which may interfere with therapies.    5.  Pain: No issues with pain currently / Controlled with APAP/Tramadol    6.  Nutrition/Dysphagia: Dietician monitors nutrient intake, recommend supplements prn and provide nutrition education to pt/family to promote optimal nutrition for wound healing/recovery.     7.  Bladder/bowel:  Start bowel and bladder program as described below, to prevent constipation, urinary retention (which may lead to UTI), and  urinary incontinence (which will impact upon pt's functional independence).   - Post void bladder scans, I&O cath for PVRs >400  - up to commode after meal     8.  Skin/dermal ulcer prophylaxis: Monitor for new skin conditions with q.2 h. turns as required to prevent the development of skin breakdown.     9.  Cognition/Behavior: As needed psychologist provides adjustment counseling to illness and psychosocial barriers that may be potential barriers to rehabilitation.     10. Respiratory therapy: RT performs O2 management prn, breathing retraining, pulmonary hygiene and bronchospasm management prn to optimize participation in therapies.     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  -PT and OT for mobility and ADLs. Per guidelines, 15 hours per week between PT, OT and SLP.  -Follow-up APAP/Tramadol  -Continue Keppra and Depakote    HTN - Patient on Lisinopril 10 mg    DM2 with hyperglycemia - Patient on Lantus and SSI as well as metformin, Januvia    HLD - Patient on Atorvastatin 20 mg QHS and Lofibra    Glaucoma - Patient on Cosopt     BPD/Mood disorder - Patient on Depakote 500/1500, Prazosin 5 mg daily, Sertraline 150 mg, Topamax 50 mg BID, Latuda 80 mg QHS and Trazodone 100 mg     Hypothyroidism - Patient on 225 mcg of Levothyroxine    Asthma - Patient on Singulair, Claritin, and Symbicort    Pain - APAP/Tramadol PRN    Skin - Patient at risk for skin breakdown due to debility in areas including sacrum, achilles, elbows and head in addition to other sites. Nursing to assess skin daily.     GI Ppx - Patient on Prilosec for GERD prophylaxis. Patient on Senna-docusate for constipation prophylaxis.     DVT Ppx - Patient Lovenox on transfer.    I personally performed a complete drug regimen review and no potential  clinically significant medication issues were identified.     Goals/Expected Level of Function Based on Current Medical and Functional Status:  (may change based on patient's medical status and rate of impairment recovery)  Transfers:   Modified Independent  Mobility/Gait:   Modified Independent  ADL's:   Modified Independent    DISPOSITION: Discharge to pre-morbid independent living setting with the supportive care of patient's family.    ELOS: 10-14  ____________________________________    T. Benjamin Smith MD./PhD.  [facility] - Physical Medicine & Rehabilitation   [facility] - Brain Injury Medicine   ____________________________________    Pt was seen today for 75 min, and entire time spent in face-to-face contact was >50% in counseling and coordination of care as detailed in A/P above.

## 2023-02-27 NOTE — FLOWSHEET NOTE
02/27/23 1411   Events/Summary/Plan   Events/Summary/Plan o2 spot check   Vital Signs   Pulse 71   Respiration 16   Pulse Oximetry 96 %   $ Pulse Oximetry (Spot Check) Yes   Respiratory Assessment   Level of Consciousness Alert   Chest Exam   Work Of Breathing / Effort Within Normal Limits   Breath Sounds   RUL Breath Sounds Clear   RML Breath Sounds Clear   RLL Breath Sounds Clear   LEONOR Breath Sounds Clear   LLL Breath Sounds Clear   Oxygen   O2 Delivery Device Nasal Cannula

## 2023-02-28 ENCOUNTER — APPOINTMENT (OUTPATIENT)
Dept: SPEECH THERAPY | Facility: REHABILITATION | Age: 43
DRG: 101 | End: 2023-02-28
Attending: PHYSICAL MEDICINE & REHABILITATION
Payer: MEDICAID

## 2023-02-28 ENCOUNTER — APPOINTMENT (OUTPATIENT)
Dept: PHYSICAL THERAPY | Facility: REHABILITATION | Age: 43
DRG: 101 | End: 2023-02-28
Attending: PHYSICAL MEDICINE & REHABILITATION
Payer: MEDICAID

## 2023-02-28 ENCOUNTER — APPOINTMENT (OUTPATIENT)
Dept: OCCUPATIONAL THERAPY | Facility: REHABILITATION | Age: 43
DRG: 101 | End: 2023-02-28
Attending: PHYSICAL MEDICINE & REHABILITATION
Payer: MEDICAID

## 2023-02-28 LAB
25(OH)D3 SERPL-MCNC: 35 NG/ML (ref 30–100)
ALBUMIN SERPL BCP-MCNC: 3.8 G/DL (ref 3.2–4.9)
ALBUMIN/GLOB SERPL: 1.9 G/DL
ALP SERPL-CCNC: 56 U/L (ref 30–99)
ALT SERPL-CCNC: 11 U/L (ref 2–50)
ANION GAP SERPL CALC-SCNC: 10 MMOL/L (ref 7–16)
AST SERPL-CCNC: 9 U/L (ref 12–45)
BASOPHILS # BLD AUTO: 1 % (ref 0–1.8)
BASOPHILS # BLD: 0.09 K/UL (ref 0–0.12)
BILIRUB SERPL-MCNC: 0.2 MG/DL (ref 0.1–1.5)
BUN SERPL-MCNC: 19 MG/DL (ref 8–22)
CALCIUM ALBUM COR SERPL-MCNC: 9.3 MG/DL (ref 8.5–10.5)
CALCIUM SERPL-MCNC: 9.1 MG/DL (ref 8.5–10.5)
CHLORIDE SERPL-SCNC: 103 MMOL/L (ref 96–112)
CO2 SERPL-SCNC: 26 MMOL/L (ref 20–33)
CREAT SERPL-MCNC: 0.76 MG/DL (ref 0.5–1.4)
EOSINOPHIL # BLD AUTO: 0.2 K/UL (ref 0–0.51)
EOSINOPHIL NFR BLD: 2.3 % (ref 0–6.9)
ERYTHROCYTE [DISTWIDTH] IN BLOOD BY AUTOMATED COUNT: 40.1 FL (ref 35.9–50)
EST. AVERAGE GLUCOSE BLD GHB EST-MCNC: 177 MG/DL
GFR SERPLBLD CREATININE-BSD FMLA CKD-EPI: 116 ML/MIN/1.73 M 2
GLOBULIN SER CALC-MCNC: 2 G/DL (ref 1.9–3.5)
GLUCOSE BLD STRIP.AUTO-MCNC: 103 MG/DL (ref 65–99)
GLUCOSE BLD STRIP.AUTO-MCNC: 118 MG/DL (ref 65–99)
GLUCOSE BLD STRIP.AUTO-MCNC: 125 MG/DL (ref 65–99)
GLUCOSE BLD STRIP.AUTO-MCNC: 150 MG/DL (ref 65–99)
GLUCOSE BLD STRIP.AUTO-MCNC: 154 MG/DL (ref 65–99)
GLUCOSE BLD STRIP.AUTO-MCNC: 90 MG/DL (ref 65–99)
GLUCOSE BLD STRIP.AUTO-MCNC: 96 MG/DL (ref 65–99)
GLUCOSE SERPL-MCNC: 141 MG/DL (ref 65–99)
HBA1C MFR BLD: 7.8 % (ref 4–5.6)
HCT VFR BLD AUTO: 42.1 % (ref 42–52)
HGB BLD-MCNC: 13.9 G/DL (ref 14–18)
IMM GRANULOCYTES # BLD AUTO: 0.09 K/UL (ref 0–0.11)
IMM GRANULOCYTES NFR BLD AUTO: 1 % (ref 0–0.9)
LYMPHOCYTES # BLD AUTO: 4.3 K/UL (ref 1–4.8)
LYMPHOCYTES NFR BLD: 48.7 % (ref 22–41)
MCH RBC QN AUTO: 30 PG (ref 27–33)
MCHC RBC AUTO-ENTMCNC: 33 G/DL (ref 33.7–35.3)
MCV RBC AUTO: 90.9 FL (ref 81.4–97.8)
MONOCYTES # BLD AUTO: 0.76 K/UL (ref 0–0.85)
MONOCYTES NFR BLD AUTO: 8.6 % (ref 0–13.4)
NEUTROPHILS # BLD AUTO: 3.39 K/UL (ref 1.82–7.42)
NEUTROPHILS NFR BLD: 38.4 % (ref 44–72)
NRBC # BLD AUTO: 0 K/UL
NRBC BLD-RTO: 0 /100 WBC
PLATELET # BLD AUTO: 278 K/UL (ref 164–446)
PMV BLD AUTO: 11 FL (ref 9–12.9)
POTASSIUM SERPL-SCNC: 4 MMOL/L (ref 3.6–5.5)
PROT SERPL-MCNC: 5.8 G/DL (ref 6–8.2)
RBC # BLD AUTO: 4.63 M/UL (ref 4.7–6.1)
SODIUM SERPL-SCNC: 139 MMOL/L (ref 135–145)
T4 FREE SERPL-MCNC: 1.16 NG/DL (ref 0.93–1.7)
TSH SERPL DL<=0.005 MIU/L-ACNC: 7.55 UIU/ML (ref 0.38–5.33)
WBC # BLD AUTO: 8.8 K/UL (ref 4.8–10.8)

## 2023-02-28 PROCEDURE — A9270 NON-COVERED ITEM OR SERVICE: HCPCS | Performed by: PHYSICAL MEDICINE & REHABILITATION

## 2023-02-28 PROCEDURE — 94760 N-INVAS EAR/PLS OXIMETRY 1: CPT

## 2023-02-28 PROCEDURE — 99222 1ST HOSP IP/OBS MODERATE 55: CPT | Performed by: HOSPITALIST

## 2023-02-28 PROCEDURE — 83036 HEMOGLOBIN GLYCOSYLATED A1C: CPT

## 2023-02-28 PROCEDURE — 84439 ASSAY OF FREE THYROXINE: CPT

## 2023-02-28 PROCEDURE — 82962 GLUCOSE BLOOD TEST: CPT | Mod: 91

## 2023-02-28 PROCEDURE — 99232 SBSQ HOSP IP/OBS MODERATE 35: CPT | Performed by: PHYSICAL MEDICINE & REHABILITATION

## 2023-02-28 PROCEDURE — 97166 OT EVAL MOD COMPLEX 45 MIN: CPT

## 2023-02-28 PROCEDURE — 97163 PT EVAL HIGH COMPLEX 45 MIN: CPT

## 2023-02-28 PROCEDURE — 700111 HCHG RX REV CODE 636 W/ 250 OVERRIDE (IP): Performed by: PHYSICAL MEDICINE & REHABILITATION

## 2023-02-28 PROCEDURE — 700102 HCHG RX REV CODE 250 W/ 637 OVERRIDE(OP): Performed by: PHYSICAL MEDICINE & REHABILITATION

## 2023-02-28 PROCEDURE — 84443 ASSAY THYROID STIM HORMONE: CPT

## 2023-02-28 PROCEDURE — 770010 HCHG ROOM/CARE - REHAB SEMI PRIVAT*

## 2023-02-28 PROCEDURE — 85025 COMPLETE CBC W/AUTO DIFF WBC: CPT

## 2023-02-28 PROCEDURE — 97116 GAIT TRAINING THERAPY: CPT

## 2023-02-28 PROCEDURE — 92523 SPEECH SOUND LANG COMPREHEN: CPT

## 2023-02-28 PROCEDURE — 90791 PSYCH DIAGNOSTIC EVALUATION: CPT | Performed by: PSYCHOLOGIST

## 2023-02-28 PROCEDURE — 36415 COLL VENOUS BLD VENIPUNCTURE: CPT

## 2023-02-28 PROCEDURE — 80053 COMPREHEN METABOLIC PANEL: CPT

## 2023-02-28 PROCEDURE — 82306 VITAMIN D 25 HYDROXY: CPT

## 2023-02-28 PROCEDURE — 97535 SELF CARE MNGMENT TRAINING: CPT

## 2023-02-28 PROCEDURE — 94640 AIRWAY INHALATION TREATMENT: CPT

## 2023-02-28 RX ORDER — DEXTROSE MONOHYDRATE 25 G/50ML
25 INJECTION, SOLUTION INTRAVENOUS
Status: DISCONTINUED | OUTPATIENT
Start: 2023-02-28 | End: 2023-03-11 | Stop reason: HOSPADM

## 2023-02-28 RX ADMIN — INSULIN HUMAN 1 UNITS: 100 INJECTION, SOLUTION PARENTERAL at 08:20

## 2023-02-28 RX ADMIN — SITAGLIPTIN 100 MG: 50 TABLET, FILM COATED ORAL at 08:18

## 2023-02-28 RX ADMIN — SERTRALINE HYDROCHLORIDE 150 MG: 50 TABLET, FILM COATED ORAL at 20:50

## 2023-02-28 RX ADMIN — DORZOLAMIDE HYDROCHLORIDE AND TIMOLOL MALEATE 1 DROP: 20; 5 SOLUTION/ DROPS OPHTHALMIC at 08:30

## 2023-02-28 RX ADMIN — METFORMIN HYDROCHLORIDE 1000 MG: 500 TABLET ORAL at 08:14

## 2023-02-28 RX ADMIN — LISINOPRIL 10 MG: 5 TABLET ORAL at 08:18

## 2023-02-28 RX ADMIN — TRAZODONE HYDROCHLORIDE 100 MG: 100 TABLET ORAL at 20:51

## 2023-02-28 RX ADMIN — MONTELUKAST 10 MG: 10 TABLET, FILM COATED ORAL at 20:51

## 2023-02-28 RX ADMIN — DAPAGLIFLOZIN 10 MG: 10 TABLET, FILM COATED ORAL at 08:17

## 2023-02-28 RX ADMIN — LEVETIRACETAM 1000 MG: 500 TABLET, FILM COATED ORAL at 20:47

## 2023-02-28 RX ADMIN — BUDESONIDE AND FORMOTEROL FUMARATE DIHYDRATE 2 PUFF: 160; 4.5 AEROSOL RESPIRATORY (INHALATION) at 12:45

## 2023-02-28 RX ADMIN — LEVOTHYROXINE SODIUM 25 MCG: 0.03 TABLET ORAL at 06:17

## 2023-02-28 RX ADMIN — ATORVASTATIN CALCIUM 20 MG: 10 TABLET, FILM COATED ORAL at 20:50

## 2023-02-28 RX ADMIN — Medication 4000 UNITS: at 20:47

## 2023-02-28 RX ADMIN — DIVALPROEX SODIUM 500 MG: 500 TABLET, DELAYED RELEASE ORAL at 08:18

## 2023-02-28 RX ADMIN — TOPIRAMATE 50 MG: 25 TABLET, FILM COATED ORAL at 20:51

## 2023-02-28 RX ADMIN — LURASIDONE HYDROCHLORIDE 80 MG: 20 TABLET, FILM COATED ORAL at 17:07

## 2023-02-28 RX ADMIN — ENOXAPARIN SODIUM 40 MG: 40 INJECTION SUBCUTANEOUS at 08:15

## 2023-02-28 RX ADMIN — BUDESONIDE AND FORMOTEROL FUMARATE DIHYDRATE 2 PUFF: 160; 4.5 AEROSOL RESPIRATORY (INHALATION) at 20:46

## 2023-02-28 RX ADMIN — LORATADINE 10 MG: 10 TABLET ORAL at 08:18

## 2023-02-28 RX ADMIN — SENNOSIDES AND DOCUSATE SODIUM 2 TABLET: 50; 8.6 TABLET ORAL at 20:48

## 2023-02-28 RX ADMIN — DIVALPROEX SODIUM 1500 MG: 500 TABLET, DELAYED RELEASE ORAL at 20:50

## 2023-02-28 RX ADMIN — OMEPRAZOLE 20 MG: 20 CAPSULE, DELAYED RELEASE ORAL at 08:21

## 2023-02-28 RX ADMIN — FENOFIBRATE 134 MG: 67 CAPSULE ORAL at 08:18

## 2023-02-28 RX ADMIN — TOPIRAMATE 50 MG: 25 TABLET, FILM COATED ORAL at 08:14

## 2023-02-28 RX ADMIN — LEVOTHYROXINE SODIUM 200 MCG: 0.12 TABLET ORAL at 06:17

## 2023-02-28 RX ADMIN — INSULIN GLARGINE-YFGN 30 UNITS: 100 INJECTION, SOLUTION SUBCUTANEOUS at 08:19

## 2023-02-28 RX ADMIN — LEVETIRACETAM 1000 MG: 500 TABLET, FILM COATED ORAL at 08:15

## 2023-02-28 RX ADMIN — DORZOLAMIDE HYDROCHLORIDE AND TIMOLOL MALEATE 1 DROP: 20; 5 SOLUTION/ DROPS OPHTHALMIC at 20:52

## 2023-02-28 RX ADMIN — METFORMIN HYDROCHLORIDE 1000 MG: 500 TABLET ORAL at 17:07

## 2023-02-28 SDOH — ECONOMIC STABILITY: TRANSPORTATION INSECURITY
IN THE PAST 12 MONTHS, HAS LACK OF RELIABLE TRANSPORTATION KEPT YOU FROM MEDICAL APPOINTMENTS, MEETINGS, WORK OR FROM GETTING THINGS NEEDED FOR DAILY LIVING?: NO

## 2023-02-28 ASSESSMENT — BRIEF INTERVIEW FOR MENTAL STATUS (BIMS)
WHAT MONTH IS IT: ACCURATE WITHIN 5 DAYS
INITIAL REPETITION OF BED BLUE SOCK - FIRST ATTEMPT: 3
ASKED TO RECALL BLUE: YES, NO CUE REQUIRED
ASKED TO RECALL SOCK: YES, NO CUE REQUIRED
WHAT DAY OF THE WEEK IS IT: CORRECT
INITIAL REPETITION OF BED BLUE SOCK - FIRST ATTEMPT: 3
BIMS SUMMARY SCORE: 15
WHAT DAY OF THE WEEK IS IT: CORRECT
ASKED TO RECALL BLUE: YES, NO CUE REQUIRED
ASKED TO RECALL BED: YES, NO CUE REQUIRED
BIMS SUMMARY SCORE: 15
ASKED TO RECALL SOCK: YES, NO CUE REQUIRED
WHAT MONTH IS IT: ACCURATE WITHIN 5 DAYS
WHAT YEAR IS IT: CORRECT
WHAT YEAR IS IT: CORRECT
ASKED TO RECALL BED: YES, NO CUE REQUIRED

## 2023-02-28 ASSESSMENT — ACTIVITIES OF DAILY LIVING (ADL)
TUB_SHOWER_TRANSFER_DESCRIPTION: GRAB BAR;SHOWER BENCH;SET-UP OF EQUIPMENT;VERBAL CUEING
BED_CHAIR_WHEELCHAIR_TRANSFER_DESCRIPTION: ADAPTIVE EQUIPMENT;INCREASED TIME;SET-UP OF EQUIPMENT;SUPERVISION FOR SAFETY;VERBAL CUEING
TOILETING: INDEPENDENT

## 2023-02-28 ASSESSMENT — GAIT ASSESSMENTS
DISTANCE (FEET): 8
GAIT LEVEL OF ASSIST: TOTAL ASSIST X 2
DEVIATION: STEP TO;DECREASED BASE OF SUPPORT;BRADYKINETIC;SHUFFLED GAIT;DECREASED HEEL STRIKE;DECREASED TOE OFF
ASSISTIVE DEVICE: PARALLEL BARS

## 2023-02-28 NOTE — THERAPY
"Speech Language Pathology   Initial Assessment     Patient Name: Norm Prabhakar  AGE:  42 y.o., SEX:  male  Medical Record #: [id number]  Today's Date: 2/28/2023     Subjective    Patient is pleasant and agreeable to evaluation at bedside.     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.)   Written Language Expression   Dominant Hand Left   ABS (Agitated Behavior Scale)   Agitated Behavior Scale Performed No   Cognitive Pattern Assessment   Cognitive Pattern Assessment Used BIMS   Brief Interview for Mental Status (BIMS)   Repetition of Three Words (First Attempt) 3   Temporal Orientation: Year Correct   Temporal Orientation: Month Accurate within 5 days   Temporal Orientation: Day Correct   Recall: \"Sock\" Yes, no cue required   Recall: \"Blue\" Yes, no cue required   Recall: \"Bed\" Yes, no cue required   BIMS Summary Score 15   Confusion Assessment Method (CAM)   Is there evidence of an acute change in mental status from the patient's baseline? No   Inattention Behavior not present   Disorganized thinking Behavior not present   Altered level of consciousness Behavior not present   Speech Mechanisms / Voice Production   Speech Mechanisms / Voice Production (WDL) X   Speaks Fluently Supervision (5)  (mild dysfluencies, that were more pronounced at begining of sesssion and appeared to resolve by end of session.)   Swallowing/Nutritional Status   Swallowing/Nutritional " Status Regular food   Functional Level of Assist   Comprehension Supervision   Comprehension Description Glasses;Verbal cues   Expression Modified Independent   Expression Description Other (comment)  (extra time)   Social Interaction Modified Independent   Social Interaction Description Medication   Problem Solving Minimal Assist   Problem Solving Description Increased time;Seat belt;Supervision;Bed/chair alarm;Therapy schedule;Verbal cueing   Memory Moderate Assist   Memory Description Supervision;Seat belt;Increased time;Bed/chair alarm;Therapy schedule;Verbal cueing   Outcome Measures   Outcome Measures Utilized SCCAN   SCCAN (Scales of Cognitive and Communicative Ability for Neurorehabilitation)   Oral Expression - Raw Score 17   Oral Expression - Scale Performance Score 89   Orientation - Raw Score 12   Orientation - Scale Performance Score 100   Memory - Raw Score 12   Memory - Scale Performance Score 63   Speech Comprehension - Raw Score 9   Speech Comprehension - Scale Performance Score 69   Reading Comprehension - Raw Score 10   Reading Comprehension - Scale Performance Score 83   Writing - Raw Score 6   Writing - Scale Performance Score 86   Attention - Raw Score 13   Attention - Scale Performance Score 81   Problem Solving - Raw Score 21   Problem Solving - Scale Performance Score 91   SCCAN Total Raw Score 78   SCCAN Degree of Severity Mild Impairment   Problem List   Problem List Cognitive-Linguistic Deficits;Attention Deficit;Memory Deficit;Visual Perception Deficit   Current Discharge Plan   Current Discharge Plan Return to Prior Living Situation   Benefit   Therapy Benefit Patient would benefit from Inpatient Rehab Speech-Language Pathology to address above identified deficits.   Interdisciplinary Plan of Care Collaboration   IDT Collaboration with  Family / Caregiver   Collaboration Comments Spoke with patient's mother via phone who reported that he has cognitive problems that have worsened in  recent months.  He was in assisted living for some years before coming to live with her about 1 year ago.  She reports that she is concerned because he doesn't care for himself well and it is her desire that he be more independent.  She reported concerns that he doesn't initiate self care.  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  The mother reports that he doesn't work but wants a job.  She reports his inability to focus on any one thing as a barrier to employment.  She reports that his speech becomes more slurred  after having seizures.  She reports that his underbite is since childhood.  She reports that he does have some trouble hearing.  Mother reports that it is challenging to care for him and reports feeling overwhelmed at times.   Strengths & Barriers   Strengths Able to follow instructions;Alert and oriented;Supportive family;Willingly participates in therapeutic activities   Barriers Impaired carryover of learning;Impaired insight/denial of deficits;Impaired functional cognition   Speech Language Pathologist Assigned   Assigned SLP / Extension LM 60 cog       Assessment    Patient is 42 y.o. male with a diagnosis of Seizure disorder, with recent 4 seizure episode.  Additional factors influencing patient status/progress (ie: cognitive factors, co-morbidities, social support, etc): History of cognitive impairment (mild).  Spoke with patient's mother via phone who reported that he has cognitive problems that have worsened in recent months.  He was in assisted living for some years before coming to live with her about 1 year ago.  She reports that she is concerned because he doesn't care for himself well and it is her desire that he be more independent.  She reported concerns that he doesn't initiate self care.  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  The mother reports that he doesn't work but wants a job.  She reports his inability to focus on any one thing as a barrier to employment.  She reports that his speech becomes more slurred  after having seizures.  She reports that his underbite is since childhood.  She reports that he does have some trouble hearing.  Mother reports that it is challenging to care for him and reports feeling overwhelmed at times.      Patient was seen for cognitive linguistic evaluation.  SCCAN administered.  Patient achieved a total raw score of 78 characteristic of an overall mild cognitive linguistic impairement.  Patient achieved the following percentage scores for given subtests:  Oral Expression 89, Orientation 100, Memory 63, Speech Comprehension 69, Reading Comprehension 83, Writing 86, Attention 81, Problem solving 91.  Patient displays moderate impairment of memory and speech comprehension, mild impairment of attention.  Will continue to assess executive functions.  Patient displayed dysfluencies in conversation at the beginning of session which faded by end of session.  No dysarthria noted during session although he does display trace distortion of articulation due to jaw structure.    Plan  Recommend Speech Therapy 30-60 minutes per day 5-6 days per week for 1-2 weeks for the following treatments:  SLP Self Care / ADL Training , SLP Cognitive Skill Development, and SLP Group Treatment.    Passport items to be completed:  Express basic needs, understand food/liquid recommendations, consistently follow swallow precautions, manage finances, manage medications, arrive to therapy appointments on time, complete daily memory log entries, solve problems related to safety situations, review education related to hospitalization, complete caregiver training     Goals:  Long term and short term goals have been discussed  with patient and they are in agreement.    Speech Therapy Problems (Active)       Problem: Memory STGs       Dates: Start: 02/28/23         Goal: STG-Within one week, patient will recall new training, daily events with 75% acc with min A.       Dates: Start: 02/28/23            Goal: STG-Within one week, patient will       Dates: Start: 02/28/23               Problem: Problem Solving STGs       Dates: Start: 02/28/23         Goal: STG-Within one week, patient will perform selective attention tasks with 75% acc with min A.       Dates: Start: 02/28/23               Problem: Speech/Swallowing LTGs       Dates: Start: 02/28/23         Goal: LTG-By discharge, patient will solve basic problems and recall safety training with 80% acc with min A.       Dates: Start: 02/28/23

## 2023-02-28 NOTE — CARE PLAN
The patient is Stable - Low risk of patient condition declining or worsening       Problem: Bowel Elimination  Goal: Patient will participate in bowel management program  2/28/2023 1404 by Yokasta Hough RLyricNLyric  Outcome: Progressing  2/28/2023 1400 by Yokasta Hough R.N.  Outcome: Progressing     Problem: Fall Risk - Rehab  Goal: Patient will remain free from falls  2/28/2023 1404 by Yokasta Hough RLyricN.  Outcome: Progressing  2/28/2023 1400 by Yokasta Hough R.N.  Outcome: Progressing

## 2023-02-28 NOTE — THERAPY
Occupational Therapy   Initial Evaluation     Patient Name: Norm Prabhakar  Age:  42 y.o., Sex:  male  Medical Record #: [id number]  Today's Date: 2/28/2023     Subjective    Patient was awake on phone upon arrival. Patient was agreeable to OT and to shower. Patient mentioned it was nice to shower after not having one for a week.     Objective       02/28/23 0701   OT Charge Group   Charges Yes   OT Self Care / ADL (Units) 1   OT Evaluation OT Evaluation Mod   OT Total Time Spent   OT Individual Total Time Spent (Mins) 60   Cosignature   Documentation Review Approved with changes as documented and edited in this column.   Prior Living Situation   Prior Services Home-Independent   Housing / Facility 2 Story House  (stays on first floor)   Steps Into Home 0   Steps In Home 0   Bathroom Set up Walk In Shower;Grab Bars;Shower Chair   Equipment Owned Grab Bar(s) In Tub / Shower;Grab Bar(s) By Toilet;Tub / Shower Seat   Lives with - Patient's Self Care Capacity Parents  (mother)   Prior Level of ADL Function   Self Feeding Independent   Grooming / Hygiene Independent   Bathing Independent   Dressing Independent   Toileting Independent   Prior Level of IADL Function   Medication Management Dependent   Laundry Independent   Kitchen Mobility Independent   Finances Independent   Home Management Independent   Shopping Independent   Prior Level Of Mobility Independent Without Device in Home   Driving / Transportation Relatives / Others Provide Transportation   Occupation (Pre-Hospital Vocational)   (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)   Leisure Interests Pets;Hobbies  (bowling, camping, softball)   Prior Functioning: Everyday Activities   Self Care Independent   Indoor Mobility (Ambulation) Independent   Stairs Independent   Functional Cognition Needed some help   Prior Device Use None of the given options  (umbrella when his R hip bothers him)   Vitals   O2 Delivery Device None - Room Air   Cognition    Level of Consciousness Alert  "  ABS (Agitated Behavior Scale)   Agitated Behavior Scale Performed No   Cognitive Pattern Assessment   Cognitive Pattern Assessment Used BIMS   Brief Interview for Mental Status (BIMS)   Repetition of Three Words (First Attempt) 3   Temporal Orientation: Year Correct   Temporal Orientation: Month Accurate within 5 days   Temporal Orientation: Day Correct   Recall: \"Sock\" Yes, no cue required   Recall: \"Blue\" Yes, no cue required   Recall: \"Bed\" Yes, no cue required   BIMS Summary Score 15   Confusion Assessment Method (CAM)   Is there evidence of an acute change in mental status from the patient's baseline? No   Inattention Behavior not present   Disorganized thinking Behavior not present   Altered level of consciousness Behavior not present   Vision Screen   Vision Not tested  (glasses)   Passive ROM Upper Body   Passive ROM Upper Body WDL   Active ROM Upper Body   Active ROM Upper Body  X   Dominant Hand Left   Comments see strength testing below   Strength Upper Body   Upper Body Strength  X   Rt Shoulder Flexion Strength 2 (P)   Rt Shoulder Abduction Strength 2- (P-)   Rt Elbow Flexion Strength 2- (P-)   Rt Wrist Flexion Strength 2- (P-)   Rt Wrist Extension Strength 2- (P-)   Right  Impaired   Sensation Upper Body   Upper Extremity Sensation  Not Tested   Upper Body Muscle Tone   Upper Body Muscle Tone  X   Rt Upper Extremity Muscle Tone Hypotonic;Gross Assist   Balance Assessment   Sitting Balance (Static) Fair +   Sitting Balance (Dynamic) Fair   Standing Balance (Static) Trace +   Standing Balance (Dynamic) Trace +   Weight Shift Sitting Good   Weight Shift Standing Poor   Bed Mobility    Supine to Sit Contact Guard Assist   Sit to Stand Moderate Assist   Coordination Upper Body   Coordination X   Comments R side NT   Eating   Assistance Needed Set-up / clean-up   CARE Score - Eating 5   Eating Discharge Goal   Discharge Goal 6   Oral Hygiene   Assistance Needed Supervision   CARE Score - Oral Hygiene 4 "   Oral Hygiene Discharge Goal   Discharge Goal 6   Shower/Bathe Self   Assistance Needed Physical assistance   Physical Assistance Level 25% or less   CARE Score - Shower/Bathe Self 3   Shower/Bathe Self Discharge Goal   Discharge Goal 4   Upper Body Dressing   Assistance Needed Incidental touching   CARE Score - Upper Body Dressing 4   Upper Body Dressing Discharge Goal   Discharge Goal 6   Lower Body Dressing   Assistance Needed Physical assistance   Physical Assistance Level Total assistance   CARE Score - Lower Body Dressing 1   Lower Body Dressing Discharge Goal   Discharge Goal 4   Putting On/Taking Off Footwear   Assistance Needed Incidental touching   CARE Score - Putting On/Taking Off Footwear 4   Putting On/Taking Off Footwear Discharge Goal   Discharge Goal 6   Toileting Hygiene   Assistance Needed Physical assistance   Physical Assistance Level Total assistance   CARE Score - Toileting Hygiene 1   Toileting Hygiene Discharge Goal   Discharge Goal 4   Toilet Transfer   Assistance Needed Physical assistance   Physical Assistance Level 26%-50%   CARE Score - Toilet Transfer 3   Toilet Transfer Discharge Goal   Discharge Goal 4   Hearing, Speech, and Vision   Ability to Hear Adequate   Ability to See in Adequate Light Adequate   Expression of Ideas and Wants Without difficulty   Understanding Verbal and Non-Verbal Content Understands   Functional Level of Assist   Eating Supervision   Grooming Standby Assist   Grooming Description Seated in wheelchair at sink;Supervision for safety   Bathing Minimal Assist  (did not attempt buttocks)   Bathing Description Grab bar;Hand held shower;Tub bench;Set-up of equipment;Supervision for safety;Verbal cueing   Upper Body Dressing Contact Guard Assist   Upper Body Dressing Description Set-up of equipment;Supervision for safety   Lower Body Dressing Total Assist  (2 people)   Lower Body Dressing Description Grab bar;Set-up of equipment;Increased time;Verbal cueing    Toileting Total Assist  (2 people for safety)   Bed, Chair, Wheelchair Transfer Moderate Assist   Bed Chair Wheelchair Transfer Description Adaptive equipment;Squat pivot transfer to wheelchair;Verbal cueing   Tub / Shower Transfers Moderate Assist  (sq pivot)   Tub Shower Transfer Description Grab bar;Shower bench;Set-up of equipment;Verbal cueing   Problem List   Problem List Decreased Active Daily Living Skills;Decreased Homemaking Skills;Decreased Upper Extremity Strength Right;Decreased Upper Extremity AROM Right;Decreased Functional Mobility;Decreased Activity Tolerance;Impaired Coordination Right Upper Extremity;Impaired Upper Extremity Tone Right;Impaired Postural Control / Balance   Precautions   Precautions Fall Risk;Other (See Comments)   Comments seizure, R hemiparesis; possible conversion disorder   Current Discharge Plan   Current Discharge Plan Return to Prior Living Situation   Benefit    Therapy Benefit Patient Would Benefit from Inpatient Rehab Occupational Therapy to Maximize [place] with ADLs, IADLs and Functional Mobility.   Interdisciplinary Plan of Care Collaboration   IDT Collaboration with  Nursing   Patient Position at End of Therapy Seated;Chair Alarm On;Self Releasing Lap Belt Applied;Call Light within Reach;Tray Table within Reach;Phone within Reach   Collaboration Comments took vitals at beginnng of session   Equipment Needs   Assistive Device / DME Parallel Bars;Wheelchair;Shower Chair;Grab Bars In Shower / Tub;Grab Bars By Toilet   Adaptive Equipment Reacher;Dressing Stick   Strengths & Barriers   Strengths Able to follow instructions;Effective communication skills;Motivated for self care and independence;Manages pain appropriately;Pleasant and cooperative;Supportive family;Willingly participates in therapeutic activities;Alert and oriented;Independent prior level of function   Barriers Decreased endurance;Hemiparesis;Impaired activity tolerance;Impaired balance;Limited  mobility       Assessment  Patient is 42 y.o. male with a diagnosis of a seizure disorder. Patient presented on 2/17 with 4 separate seizures.  Additional factors influencing patient status / progress (ie: cognitive factors, co-morbidities, social support, etc): TBI, BPD, R sided hemiparesis due to past CVA, conversion disorder, PTSD, HTN, DM2, and HLD. Patient lives on first floor of 2 story house with mother. 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    Plan  Recommend Occupational Therapy  minutes per day 5-7 days per week for 14-21 days for the following treatments:  OT Group Therapy, OT Self Care/ADL, OT Community Reintegration, OT Neuro Re-Ed/Balance, OT Therapeutic Activity, OT Evaluation, and OT Therapeutic Exercise.    Passport items to be completed:  Perform bathroom transfers, complete dressing, complete feeding, get ready for the day, prepare a simple meal, participate in household tasks, adapt home for safety needs, demonstrate home exercise program, complete caregiver training     Goals:  Long term and short term goals have been discussed with patient and they are in agreement.    Occupational Therapy Goals (Active)       Problem: Bathing       Dates: Start: 02/28/23         Goal: STG-Within one week, patient will bathe with SBA using grab bars as needed.        Dates: Start: 02/28/23               Problem: Dressing       Dates: Start: 02/28/23         Goal: STG-Within one week, patient will dress LB with mod A.       Dates: Start: 02/28/23               Problem: Functional Transfers       Dates: Start: 02/28/23         Goal: STG-Within one week, patient will transfer to toilet with min A.       Dates: Start: 02/28/23               Problem: OT Long Term Goals       Dates: Start: 02/28/23         Goal: LTG-By discharge, patient will complete basic self care tasks with supervision.       Dates: Start: 02/28/23            Goal: LTG-By discharge, patient will perform bathroom transfers with supervision.        Dates: Start: 02/28/23            Goal: LTG-By discharge, patient will complete basic home management with supervision.        Dates: Start: 02/28/23

## 2023-02-28 NOTE — PROGRESS NOTES
"  Physical Medicine & Rehabilitation Progress Note    Encounter Date: 2/28/2023    Chief Complaint: Decreased mobility, right sided weakness    Interval Events (Subjective):  Patient sitting up in room. He reports therapy is going well. He denies pain. Denies NVD. He reports he actually got very good sleep for the first time last night. Reviewed AM labs including ongoing hyperglycemia. Discussed would consult hospitalist. A1c 7.8, he reports he has been trying to keep it under 8.  Denies NVD.     Objective:  VITAL SIGNS: /70   Pulse 61   Temp 36.6 °C (97.9 °F) (Oral)   Resp 18   Ht 1.981 m (6' 6\")   Wt 109 kg (240 lb)   SpO2 94%   BMI 27.73 kg/m²   Gen: NAD  Psych: Mood and affect appropriate  CV: RRR, 0 edema  Resp: CTAB, no upper airway sounds  Abd: NTND  Neuro: AOx4, 2/5 RUE    Laboratory Values:  Recent Results (from the past 72 hour(s))   POCT glucose device results    Collection Time: 02/27/23  5:13 PM   Result Value Ref Range    POC Glucose, Blood 159 (H) 65 - 99 mg/dL   POCT glucose device results    Collection Time: 02/27/23  9:06 PM   Result Value Ref Range    POC Glucose, Blood 150 (H) 65 - 99 mg/dL   CBC with Differential    Collection Time: 02/28/23  5:12 AM   Result Value Ref Range    WBC 8.8 4.8 - 10.8 K/uL    RBC 4.63 (L) 4.70 - 6.10 M/uL    Hemoglobin 13.9 (L) 14.0 - 18.0 g/dL    Hematocrit 42.1 42.0 - 52.0 %    MCV 90.9 81.4 - 97.8 fL    MCH 30.0 27.0 - 33.0 pg    MCHC 33.0 (L) 33.7 - 35.3 g/dL    RDW 40.1 35.9 - 50.0 fL    Platelet Count 278 164 - 446 K/uL    MPV 11.0 9.0 - 12.9 fL    Neutrophils-Polys 38.40 (L) 44.00 - 72.00 %    Lymphocytes 48.70 (H) 22.00 - 41.00 %    Monocytes 8.60 0.00 - 13.40 %    Eosinophils 2.30 0.00 - 6.90 %    Basophils 1.00 0.00 - 1.80 %    Immature Granulocytes 1.00 (H) 0.00 - 0.90 %    Nucleated RBC 0.00 /100 WBC    Neutrophils (Absolute) 3.39 1.82 - 7.42 K/uL    Lymphs (Absolute) 4.30 1.00 - 4.80 K/uL    Monos (Absolute) 0.76 0.00 - 0.85 K/uL    Eos " (Absolute) 0.20 0.00 - 0.51 K/uL    Baso (Absolute) 0.09 0.00 - 0.12 K/uL    Immature Granulocytes (abs) 0.09 0.00 - 0.11 K/uL    NRBC (Absolute) 0.00 K/uL   Comp Metabolic Panel (CMP)    Collection Time: 02/28/23  5:12 AM   Result Value Ref Range    Sodium 139 135 - 145 mmol/L    Potassium 4.0 3.6 - 5.5 mmol/L    Chloride 103 96 - 112 mmol/L    Co2 26 20 - 33 mmol/L    Anion Gap 10.0 7.0 - 16.0    Glucose 141 (H) 65 - 99 mg/dL    Bun 19 8 - 22 mg/dL    Creatinine 0.76 0.50 - 1.40 mg/dL    Calcium 9.1 8.5 - 10.5 mg/dL    AST(SGOT) 9 (L) 12 - 45 U/L    ALT(SGPT) 11 2 - 50 U/L    Alkaline Phosphatase 56 30 - 99 U/L    Total Bilirubin 0.2 0.1 - 1.5 mg/dL    Albumin 3.8 3.2 - 4.9 g/dL    Total Protein 5.8 (L) 6.0 - 8.2 g/dL    Globulin 2.0 1.9 - 3.5 g/dL    A-G Ratio 1.9 g/dL   HEMOGLOBIN A1C    Collection Time: 02/28/23  5:12 AM   Result Value Ref Range    Glycohemoglobin 7.8 (H) 4.0 - 5.6 %    Est Avg Glucose 177 mg/dL   TSH with Reflex to FT4    Collection Time: 02/28/23  5:12 AM   Result Value Ref Range    TSH 7.550 (H) 0.380 - 5.330 uIU/mL   Vitamin D, 25-hydroxy (blood)    Collection Time: 02/28/23  5:12 AM   Result Value Ref Range    25-Hydroxy   Vitamin D 25 35 30 - 100 ng/mL   CORRECTED CALCIUM    Collection Time: 02/28/23  5:12 AM   Result Value Ref Range    Correct Calcium 9.3 8.5 - 10.5 mg/dL   ESTIMATED GFR    Collection Time: 02/28/23  5:12 AM   Result Value Ref Range    GFR (CKD-EPI) 116 >60 mL/min/1.73 m 2   FREE THYROXINE    Collection Time: 02/28/23  5:12 AM   Result Value Ref Range    Free T-4 1.16 0.93 - 1.70 ng/dL   POCT glucose device results    Collection Time: 02/28/23  8:14 AM   Result Value Ref Range    POC Glucose, Blood 154 (H) 65 - 99 mg/dL       Medications:  Scheduled Medications   Medication Dose Frequency    senna-docusate  2 Tablet BID    omeprazole  20 mg DAILY    atorvastatin  20 mg Q EVENING    levothyroxine  200 mcg AM ES    levothyroxine  25 mcg AM ES    vitamin D3  4,000 Units Q  EVENING    divalproex  1,500 mg QHS    fenofibrate micronized  134 mg DAILY    insulin GLARGINE  30 Units BID    lisinopril  10 mg DAILY    lurasidone  80 mg PM MEAL    metformin  1,000 mg BID WITH MEALS    montelukast  10 mg Q EVENING    prazosin  5 mg Q EVENING    sertraline  150 mg QHS    SITagliptin  100 mg DAILY    levETIRAcetam  1,000 mg Q12HRS    divalproex  500 mg DAILY    loratadine  10 mg DAILY    topiramate  50 mg Q12HRS    traZODone  100 mg QHS    dorzolamide-timolol  1 Drop BID    insulin regular  1-6 Units 4X/DAY ACHS    budesonide-formoterol  2 Puff BID (RT)    enoxaparin (LOVENOX) injection  40 mg DAILY    dapagliflozin propanediol  10 mg DAILY     PRN medications: Respiratory Therapy Consult, hydrALAZINE, acetaminophen, senna-docusate **AND** polyethylene glycol/lytes **AND** magnesium hydroxide **AND** bisacodyl, mag hydrox-al hydrox-simeth, ondansetron **OR** ondansetron, traZODone, sodium chloride, traMADol, midazolam, insulin regular **AND** POC blood glucose manual result **AND** NOTIFY MD and PharmD **AND** Administer 20 grams of glucose (approximately 8 ounces of fruit juice) every 15 minutes PRN FSBG less than 70 mg/dL **AND** dextrose bolus, albuterol    Diet:  Current Diet Order   Procedures    Diet Order Diet: Consistent CHO (Diabetic)       Assessment:  Active Hospital Problems    Diagnosis     *Seizure disorder (HCC)     Acute right-sided weakness     Primary hypertension     Mixed hyperlipidemia     Type 2 diabetes mellitus without complication, without long-term current use of insulin (HCC)     PTSD (post-traumatic stress disorder)     Neck mass     Postoperative hypothyroidism     Anxiety and depression        Medical Decision Making and Plan:  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  -PT and OT for mobility and ADLs. Per guidelines, 15 hours per  week between PT, OT and SLP.  -Follow-up APAP/Tramadol  -Continue Keppra and Depakote     HTN - Patient on Lisinopril 10 mg     DM2 with hyperglycemia - Patient on Lantus and SSI as well as metformin, Januvia. Blood sugars elevated, consult hospitalist. A1c 7.8     HLD - Patient on Atorvastatin 20 mg QHS and Lofibra     Glaucoma - Patient on Cosopt      BPD/Mood disorder - Patient on Depakote 500/1500, Prazosin 5 mg daily, Sertraline 150 mg, Topamax 50 mg BID, Latuda 80 mg QHS and Trazodone 100 mg   -Consult Neuropsychology. Has history of suicide attempts per chart reviewed. Discussed case with Dr. Cervantes     Anemia - 13.9 on admission, will monitor    Hypothyroidism - Patient on 225 mcg of Levothyroxine. TSH elevated but T4 normal.      Asthma - Patient on Singulair, Claritin, and Symbicort     Pain - APAP/Tramadol PRN     Skin - Patient at risk for skin breakdown due to debility in areas including sacrum, achilles, elbows and head in addition to other sites. Nursing to assess skin daily.     GI Ppx - Patient on Prilosec for GERD prophylaxis. Patient on Senna-docusate for constipation prophylaxis.      DVT Ppx - Patient Lovenox on transfer.    ____________________________________    T. Benjamin Smith MD./PhD.  [facility] - Physical Medicine & Rehabilitation   [facility] - Brain Injury Medicine   ____________________________________

## 2023-02-28 NOTE — THERAPY
"Physical Therapy   Initial Evaluation     Patient Name: Norm Prabhakar  Age:  42 y.o., Sex:  male  Medical Record #: [id number]  Today's Date: 2/28/2023     Subjective    Pt received up in , agreeable to participate. \"I have to hurry up and get stronger so I can go home to my puppies.\"     Objective       02/28/23 0831   PT Charge Group   PT Gait Training (Units) 1   PT Evaluation PT Evaluation High   PT Total Time Spent   PT Individual Total Time Spent (Mins) 60   Prior Living Situation   Prior Services Home-Independent   Housing / Facility 2 Story House  (lives primarily on 1st floor, mother lives on 2nd floor)   Steps Into Home 0   Steps In Home   (1 FOS)   Rail None   Elevator No   Equipment Owned None   Lives with - Patient's Self Care Capacity Parents;Attendant / Paid Care Giver  (mother and mother's caretaker)   Comments Lives in [place] with mother and mother's caretaker. 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 and enjoys bowling, softball, darts, pool, fishing, and camping although has not done any of these hobbies for a few yrs   Prior Level of Functional Mobility   Bed Mobility Independent   Transfer Status Independent   Ambulation Independent   Distance Ambulation (Feet)   (community)   Assistive Devices Used None   Stairs Independent   Comments Reports using umbrella as SPC approx 1x/month d/t R hip pain, otherwise no AD at baseline   Prior Functioning: Everyday Activities   Self Care Independent   Indoor Mobility (Ambulation) Independent   Stairs Independent   Functional Cognition Needed some help   Prior Device Use None of the given options   Passive ROM Lower Body   Passive ROM Lower Body X   Comments limited B ankle DF d/t muscle tightness but WFL   Active ROM Lower Body    Active ROM Lower Body  X   Comments limited R hip, knee, ankle d/t weakness   Strength Lower " Body   Lower Body Strength  X   Rt Hip Flexion Strength 3- (F-)   Rt Knee Extension Strength 2+ (P+)   Rt Ankle Dorsiflexion Strength 2+ (P+)   Comments LLE grossly 4/5 to 5/5   Sensation Lower Body   Lower Extremity Sensation   X   Comments accurate localization but dec sensation grossly RLE; normal B big toe proprioception   Lower Body Muscle Tone   Comments normal MAS RLE   Balance Assessment   Sitting Balance (Static) Fair +   Sitting Balance (Dynamic) Fair   Standing Balance (Static) Poor +   Standing Balance (Dynamic) Poor +   Weight Shift Sitting Fair   Weight Shift Standing Fair   Comments standing with UE support on // bars   Bed Mobility    Supine to Sit Standby Assist  (RLE hooked over LLE)   Sit to Supine Standby Assist  (RLE hooked over LLE)   Sit to Stand Moderate Assist   Scooting Standby Assist   Rolling Minimum Assist to Lt.  (for RUE mgt)   Neurological Concerns   Comments no clonus noted, B Babinski testing limited d/t ticklishness   Roll Left and Right   Assistance Needed Physical assistance   Physical Assistance Level 25% or less   CARE Score - Roll Left and Right 3   Roll Left and Right Discharge Goal   Discharge Goal 6   Sit to Lying   Assistance Needed Supervision   CARE Score - Sit to Lying 4   Sit to Lying Discharge Goal   Discharge Goal 6   Lying to Sitting on Side of Bed   Assistance Needed Supervision   CARE Score - Lying to Sitting on Side of Bed 4   Lying to Sitting on Side of Bed Discharge Goal   Discharge Goal 6   Sit to Stand   Assistance Needed Physical assistance   Physical Assistance Level 26%-50%   CARE Score - Sit to Stand 3   Sit to Stand Discharge Goal   Discharge Goal 6   Chair/Bed-to-Chair Transfer   Assistance Needed Physical assistance   Physical Assistance Level 26%-50%   CARE Score - Chair/Bed-to-Chair Transfer 3   Chair/Bed-to-Chair Transfer Discharge Goal   Discharge Goal 6   Car Transfer   Reason if not Attempted Safety concerns   CARE Score - Car Transfer 88   Car  Transfer Discharge Goal   Discharge Goal 5   Walk 10 Feet   Reason if not Attempted Safety concerns   CARE Score - Walk 10 Feet 88   Walk 10 Feet Discharge Goal   Discharge Goal 6   Walk 50 Feet with Two Turns   Reason if not Attempted Safety concerns   CARE Score - Walk 50 Feet with Two Turns 88   Walk 50 Feet with Two Turns Discharge Goal   Discharge Goal 6   Walk 150 Feet   Reason if not Attempted Safety concerns   CARE Score - Walk 150 Feet 88   Walk 150 Feet Discharge Goal   Discharge Goal 4   Walking 10 Feet on Uneven Surfaces   Reason if not Attempted Safety concerns   CARE Score - Walking 10 Feet on Uneven Surfaces 88   Walking 10 Feet on Uneven Surfaces Discharge Goal   Discharge Goal 4   1 Step (Curb)   Reason if not Attempted Safety concerns   CARE Score - 1 Step (Curb) 88   1 Step (Curb) Discharge Goal   Discharge Goal 4   4 Steps   Reason if not Attempted Safety concerns   CARE Score - 4 Steps 88   4 Steps Discharge Goal   Discharge Goal 4   12 Steps   Reason if not Attempted Safety concerns   CARE Score - 12 Steps 88   12 Steps Discharge Goal   Discharge Goal 4   Picking Up Object   Reason if not Attempted Safety concerns   CARE Score - Picking Up Object 88   Picking Up Object Discharge Goal   Discharge Goal 6   Wheel 50 Feet with Two Turns   Reason if not Attempted Activity not applicable   CARE Score - Wheel 50 Feet with Two Turns 9   Wheel 50 Feet with Two Turns Discharge Goal   Discharge Goal 9   Wheel 150 Feet   Reason if not Attempted Activity not applicable   CARE Score - Wheel 150 Feet 9   Wheel 150 Feet Discharge Goal   Discharge Goal 9   Gait Functional Level of Assist    Gait Level Of Assist Total Assist X 2  (min-mod A x1 with wc follow)   Assistive Device Parallel Bars   Distance (Feet) 8   # of Times Distance was Traveled 1   Deviation Step To;Decreased Base Of Support;Bradykinetic;Shuffled Gait;Decreased Heel Strike;Decreased Toe Off   Stairs Functional Level of Assist   Level of Assist  with Stairs Unable to Participate   Transfer Functional Level of Assist   Bed, Chair, Wheelchair Transfer Moderate Assist   Bed Chair Wheelchair Transfer Description Adaptive equipment;Increased time;Set-up of equipment;Supervision for safety;Verbal cueing  (stand pivot FWW)   Problem List    Problems Impaired Bed Mobility;Impaired Transfers;Impaired Ambulation;Functional ROM Deficit;Functional Strength Deficit;Impaired Balance;Decreased Activity Tolerance;Safety Awareness Deficits / Cognition   Precautions   Precautions Fall Risk;Other (See Comments)   Comments seizure, R hemiparesis; possible conversion disorder   Current Discharge Plan   Current Discharge Plan Return to Prior Living Situation   Interdisciplinary Plan of Care Collaboration   IDT Collaboration with  Occupational Therapist   Patient Position at End of Therapy In Bed;Bed Alarm On;Call Light within Reach;Tray Table within Reach;Phone within Reach  (with RUE propped on pillow)   Collaboration Comments CLOF   Benefit   Therapy Benefit Patient Would Benefit from Inpatient Rehabilitation Physical Therapy to Maximize Functional [place] with ADLs, IADLs and Mobility.   Strengths & Barriers   Strengths Able to follow instructions;Alert and oriented;Effective communication skills;Independent prior level of function;Motivated for self care and independence;Pleasant and cooperative;Willingly participates in therapeutic activities   Barriers Decreased endurance;Fatigue;Hemiparesis;Impaired activity tolerance;Impaired balance;Impaired functional cognition;Limited mobility  (hx of TBI and CVA)     Pt was oriented to role of PT and expectations of POC.     Assessment  Patient is 42 y.o. male with a diagnosis of seizures.  Additional factors influencing patient status / progress (ie: cognitive factors, co-morbidities, social support, etc): medically complex PMH of TBI, seizures, conversion disorder, PTSD, BPD and right sided weakness with previous CVA;  independent PLOF with no AD at baseline; reports falling 1-2x/month; familiarity with this facility from prior stay.    Pt presents with R hemiparesis and impaired functional cognition that limit his txs, ambulation, and endurance. Pt is performing below his baseline level of function and should benefit from inpatient rehabilitation PT services to address the above listed deficits and maximize functional independence.     Plan  Recommend Physical Therapy  minutes per day 5-7 days per week for 14-21 days for the following treatments:  PT Gait Training, PT Therapeutic Exercises, PT Neuro Re-Ed/Balance, PT Therapeutic Activity, PT Manual Therapy, and PT Evaluation.    Passport items to be completed:  Get in/out of bed safely, in/out of a vehicle, safely use mobility device, walk or wheel around home/community, navigate up and down stairs, show how to get up/down from the ground, ensure home is accessible, demonstrate HEP, complete caregiver training    Goals:  Long term and short term goals have been discussed with patient and they are in agreement.    Physical Therapy Problems (Active)       Problem: Mobility       Dates: Start: 02/28/23         Goal: STG-Within one week, patient will ambulate household distance 25 ft x2 with FWW and CGA.       Dates: Start: 02/28/23            Goal: STG-Within one week, patient will ambulate up/down a curb with FWW and min A.       Dates: Start: 02/28/23            Goal: STG-Within one week, patient will ambulate up/down flight of stairs with min A using BHR and RB prn.       Dates: Start: 02/28/23               Problem: Mobility Transfers       Dates: Start: 02/28/23         Goal: STG-Within one week, patient will transfer bed to chair with FWW and SBA.       Dates: Start: 02/28/23               Problem: PT-Long Term Goals       Dates: Start: 02/28/23         Goal: LTG-By discharge, patient will ambulate household distances at least 150 ft with FWW vs LRAD and SPV.        Dates: Start: 02/28/23            Goal: LTG-By discharge, patient will transfer one surface to another with FWW vs LRAD and SPV.       Dates: Start: 02/28/23            Goal: LTG-By discharge, patient will ambulate up/down flight of stairs with BHR and SPV requiring no RB.       Dates: Start: 02/28/23            Goal: LTG-By discharge, patient will transfer in/out of a car with FWW vs LRAD and set up A.       Dates: Start: 02/28/23

## 2023-02-28 NOTE — CONSULTS
"  HOSPITAL MEDICINE CONSULTATION    Requesting Physician:  Dr. Smith    Reason for Consult:  Hypertension, Diabetes    History of Present Illness:  The patient is a 42-year-old  male with past medical history significant for traumatic brain injury with seizure disorder, asthma, hypertension, diabetes mellitus, dyslipidemia, hypothyroidism, glaucoma, and multiple psychiatric diagnoses (bipolar disorder, post-traumatic stress disorder).  He was admitted to [facility] on 2/17/23 for multiple breakthrough seizures due to missed doses of Depakote.  He was treated with the addition of Keppra.  Apparently, conversion disorder was also entertained.  Due to his ongoing functional debility, the patient was transferred to [facility] on 2/27/23.  Hospital Medicine consultation is requested to assist in the management of this patient's HTN and DM.    Review of Systems:  Review of Systems   Constitutional:  Negative for chills and fever.   HENT: Negative.     Eyes: Negative.    Respiratory:  Negative for cough and shortness of breath.    Cardiovascular:  Negative for chest pain and palpitations.   Gastrointestinal:  Negative for abdominal pain, nausea and vomiting.   Genitourinary: Negative.    Musculoskeletal: Negative.    Skin:  Negative for itching and rash.   Endo/Heme/Allergies:  Negative for polydipsia. Does not bruise/bleed easily.   All other systems reviewed and are negative.    Allergies:  Allergies   Allergen Reactions    Abilify      \"Feeling tired, like I don't even know whats going on around me\"    Fish      Pt reports salmon causes him to be sick to his stomach  Not listed on MAR noted 2/3/2021      Geodon [Ziprasidone Hcl] Anaphylaxis     Anaphylaxis per patient    Aripiprazole      \"I became lethargic.\"    Ziprasidone        Medications:    Current Facility-Administered Medications:     insulin GLARGINE (Lantus,Semglee) injection, 24 Units, Subcutaneous, BID, " Bria Echavarria M.D.    lurasidone (LATUDA) tablet 20 mg, 20 mg, Oral, PM MEAL, Karan Smith M.D., 20 mg at 03/05/23 1710    insulin regular (HumuLIN R,NovoLIN R) injection, 2-12 Units, Subcutaneous, 4X/DAY ACHS **AND** POC blood glucose manual result, , , Q AC AND BEDTIME(S) **AND** NOTIFY MD and PharmD, , , Once **AND** Administer 20 grams of glucose (approximately 8 ounces of fruit juice) every 15 minutes PRN FSBG less than 70 mg/dL, , , PRN **AND** dextrose 50% (D50W) injection 25 g, 25 g, Intravenous, Q15 MIN PRN, Bria Echavarria M.D.    Respiratory Therapy Consult, , Nebulization, Continuous RT, Karan Smith M.D.    hydrALAZINE (APRESOLINE) tablet 25 mg, 25 mg, Oral, Q8HRS PRN, Karan Smith M.D.    acetaminophen (Tylenol) tablet 650 mg, 650 mg, Oral, Q4HRS PRN, Karan Smith M.D., 650 mg at 03/04/23 1558    senna-docusate (PERICOLACE or SENOKOT S) 8.6-50 MG per tablet 2 Tablet, 2 Tablet, Oral, BID, 2 Tablet at 03/05/23 0833 **AND** polyethylene glycol/lytes (MIRALAX) PACKET 1 Packet, 1 Packet, Oral, QDAY PRN **AND** magnesium hydroxide (MILK OF MAGNESIA) suspension 30 mL, 30 mL, Oral, QDAY PRN **AND** bisacodyl (DULCOLAX) suppository 10 mg, 10 mg, Rectal, QDAY PRN, Karan Smith M.D.    omeprazole (PRILOSEC) capsule 20 mg, 20 mg, Oral, DAILY, Karan Smith M.D., 20 mg at 03/05/23 0834    mag hydrox-al hydrox-simeth (MAALOX PLUS ES or MYLANTA DS) suspension 20 mL, 20 mL, Oral, Q2HRS PRN, Karan Smith M.D.    ondansetron (ZOFRAN ODT) dispertab 4 mg, 4 mg, Oral, 4X/DAY PRN **OR** ondansetron (ZOFRAN) syringe/vial injection 4 mg, 4 mg, Intramuscular, 4X/DAY PRN, Karan Smith M.D.    traZODone (DESYREL) tablet 50 mg, 50 mg, Oral, QHS PRN, Karan Smith M.D.    sodium chloride (OCEAN) 0.65 % nasal spray 2 Spray, 2 Spray, Nasal, PRN, Karan Smith M.D.    traMADol (Ultram) 50 MG tablet 50 mg, 50 mg, Oral, Q4HRS PRN,  Karan Smith M.D., 50 mg at 03/05/23 0832    midazolam (VERSED) 5 mg/mL (1 mL vial), 5 mg, Nasal, PRN, Karan Smith M.D.    atorvastatin (LIPITOR) tablet 20 mg, 20 mg, Oral, Q EVENING, Karan Smith M.D., 20 mg at 03/04/23 2251    levothyroxine (SYNTHROID) tablet 200 mcg, 200 mcg, Oral, AM ES, Karan Smith M.D., 200 mcg at 03/05/23 0639    levothyroxine (SYNTHROID) tablet 25 mcg, 25 mcg, Oral, AM ES, Karan Smith M.D., 25 mcg at 03/05/23 0640    vitamin D3 (cholecalciferol) tablet 4,000 Units, 4,000 Units, Oral, Q EVENING, Karan Smith M.D., 4,000 Units at 03/04/23 2252    divalproex (DEPAKOTE) delayed-release tablet 1,500 mg, 1,500 mg, Oral, QHS, Karan Smith M.D., 1,500 mg at 03/04/23 2250    fenofibrate micronized (LOFIBRA) capsule 134 mg, 134 mg, Oral, DAILY, Karan Smith M.D., 134 mg at 03/05/23 0833    montelukast (SINGULAIR) tablet 10 mg, 10 mg, Oral, Q EVENING, Karan Smith M.D., 10 mg at 03/04/23 2253    prazosin (MINIPRESS) capsule 5 mg, 5 mg, Oral, Q EVENING, Karan Smith M.D., 5 mg at 03/04/23 2306    sertraline (Zoloft) tablet 150 mg, 150 mg, Oral, QHS, Karan Smith M.D., 150 mg at 03/04/23 2253    levETIRAcetam (KEPPRA) tablet 1,000 mg, 1,000 mg, Oral, Q12HRS, Karan Smith M.D., 1,000 mg at 03/05/23 0833    divalproex (DEPAKOTE) delayed-release tablet 500 mg, 500 mg, Oral, DAILY, Karan Smith M.D., 500 mg at 03/05/23 0834    loratadine (CLARITIN) tablet 10 mg, 10 mg, Oral, DAILY, Karan Smith M.D., 10 mg at 03/05/23 0834    topiramate (TOPAMAX) tablet 50 mg, 50 mg, Oral, Q12HRS, Karan Smith M.D., 50 mg at 03/05/23 0834    traZODone (DESYREL) tablet 100 mg, 100 mg, Oral, QHS, Karan Smith M.D., 100 mg at 03/04/23 5641    dorzolamide-timolol (COSOPT) 22.3-6.8 MG/ML ophthalmic drops 1 Drop, 1 Drop, Both Eyes, BID,  Karan Smith M.D., 1 Drop at 23 0837    albuterol inhaler 2 Puff, 2 Puff, Inhalation, 4X/DAY PRN, Karan Smith M.D.    budesonide-formoterol (SYMBICORT) 160-4.5 MCG/ACT inhaler 2 Puff, 2 Puff, Inhalation, BID (RT), Karan Smith M.D., 2 Puff at 23 0804    enoxaparin (Lovenox) inj 40 mg, 40 mg, Subcutaneous, DAILY, Karan Smith M.D., 40 mg at 23 0831    Past Medical/Surgical History:  Past Medical History:   Diagnosis Date    Anxiety     BIPOLAR    ASTHMA     Bipolar 1 disorder ([facility])     Depression     Diabetes ([facility])     Type II Diabetes    Fall     passed out 2 wks ago    Glaucoma     Glaucoma     both eyes/ blind on left eye    Hypothyroidism     Indigestion     once in a while    Mental disorder     learning disabilities; speech impairment; developmental delays    Murmur     since birth    Pneumonia     remote    Psychiatric problem     PTSD    S/P thyroidectomy     Seizure ([facility]) 2010    Seizure disorder ([facility])     Unspecified disorder of thyroid      Past Surgical History:   Procedure Laterality Date    EYE SURGERY      OTHER      Hernia Repair when he was 10 yrs old    THYROID LOBECTOMY         Social History:  Social History     Socioeconomic History    Marital status: Single     Spouse name: Not on file    Number of children: Not on file    Years of education: Not on file    Highest education level: Not on file   Occupational History    Not on file   Tobacco Use    Smoking status: Former     Packs/day: 0.25     Types: Cigarettes, Cigars     Quit date: 2020     Years since quittin.9    Smokeless tobacco: Never    Tobacco comments:     3 cigarettes a day   Vaping Use    Vaping Use: Former   Substance and Sexual Activity    Alcohol use: Not Currently    Drug use: Yes     Types: Inhaled     Comment: marijuana    Sexual activity: Not on file   Other Topics Concern    Not on file   Social History Narrative    Not on file     Social  Determinants of Health     Financial Resource Strain: Not on file   Food Insecurity: Not on file   Transportation Needs: No Transportation Needs    Lack of Transportation (Medical): No    Lack of Transportation (Non-Medical): No   Physical Activity: Not on file   Stress: Not on file   Social Connections: Not on file   Intimate Partner Violence: Not on file   Housing Stability: Not on file       Family History:  Family History   Problem Relation Age of Onset    Hypertension Mother     Heart Disease Mother     Lung Disease Mother     Stroke Maternal Grandmother        Physical Examination:   Vitals:    03/04/23 1845 03/05/23 0700 03/05/23 0805 03/05/23 1400   BP: 104/63 94/60  99/65   Pulse: (!) 59 (!) 52 (!) 58 (!) 51   Resp: 18 16 16 16   Temp: 36.4 °C (97.5 °F) 36.3 °C (97.3 °F)  36.6 °C (97.9 °F)   TempSrc: Oral Oral  Oral   SpO2: 97% 94% 94% 96%   Weight:       Height:           Physical Exam  Vitals reviewed.   Constitutional:       General: He is not in acute distress.     Appearance: Normal appearance. He is not ill-appearing.   HENT:      Head: Normocephalic and atraumatic.      Right Ear: External ear normal.      Left Ear: External ear normal.      Nose: Nose normal.      Mouth/Throat:      Pharynx: Oropharynx is clear.   Eyes:      General:         Right eye: No discharge.         Left eye: No discharge.      Extraocular Movements: Extraocular movements intact.      Conjunctiva/sclera: Conjunctivae normal.   Cardiovascular:      Rate and Rhythm: Normal rate and regular rhythm.   Pulmonary:      Effort: Pulmonary effort is normal. No respiratory distress.      Breath sounds: Normal breath sounds. No wheezing.   Abdominal:      General: Bowel sounds are normal. There is no distension.      Palpations: Abdomen is soft.      Tenderness: There is no abdominal tenderness.   Musculoskeletal:      Cervical back: Normal range of motion and neck supple.      Right lower leg: No edema.      Left lower leg: No edema.    Skin:     General: Skin is warm and dry.   Neurological:      Mental Status: He is alert and oriented to person, place, and time.             Impressions/Recommendations:  Mixed hyperlipidemia  On Lipitor and Fenofibrate    Type 2 diabetes mellitus without complication, without long-term current use of insulin (HCC)  HbA1c 7.8  On Farxiga, Metformin, Januvia, Lantus bid, and SSI  Will increase Lantus and SSI    Seizure disorder (HCC)  On Depakote and Keppra    Glaucoma  On Cosopt    Hx of thyroidectomy  TSH 7.55 and FT4 1.16  Already on high dose Synthroid  Needs outpt F/U    Primary hypertension  Observe blood pressure trends on Lisinopril  Watch for orthostatic hypotension on Prazosin    Psychiatric problem  On Zoloft,Topamax, Latuda, and Prazosin    Asthma  On Symbicort and Singulair  RT protocol    Full Code    Thank you for the opportunity to assist in this patient's care.  We will continue to follow along with you.

## 2023-02-28 NOTE — IDT DISCHARGE PLANNING
CASE MANAGEMENT INITIAL ASSESSMENT    Admit Date:  2/27/2023     CM reviewed the medical chart and will meet with the patient  to discuss role of case management / discharge planning / team conference.       Diagnosis: Seizure disorder ([facility]) [G40.909]    Co-morbidities:   Patient Active Problem List    Diagnosis Date Noted    Acute right-sided weakness 02/26/2022    Conversion disorder 02/06/2021    Seizure disorder ([facility]) 10/17/2020    Seizure ([facility]) 08/31/2020    Vision changes 03/03/2020    Primary hypertension 02/29/2020    UTI (urinary tract infection) 12/29/2019    CVA (cerebral vascular accident) ([facility]) 12/27/2019    Hx of thyroidectomy 08/02/2019    Rash 07/22/2019    Urticaria of entire body 08/19/2018    Mixed hyperlipidemia 08/19/2018    Intractable abdominal pain 07/28/2018    Class 1 obesity due to excess calories with serious comorbidity and body mass index (BMI) of 32.0 to 32.9 in adult 07/28/2018    Pre-ulcerative corn or callous 02/04/2018    Type 2 diabetes mellitus without complication, without long-term current use of insulin ([facility]) 02/02/2018    Right hemiparesis ([facility]) 09/12/2017    Hyperglycemia 09/12/2017    PTSD (post-traumatic stress disorder) 03/06/2016    Pansinusitis 05/27/2015    Change in mental status 05/26/2015    History of seizure 12/01/2014    Patient non adherence 10/16/2014    Headache 08/06/2014    Syncope 07/27/2014    Psychiatric problem     Anemia 05/03/2013    Neck mass 12/06/2012    Paralysis on one side of body ([facility]) 08/05/2012    Asthma 08/15/2011    Bradycardia 08/15/2011    Right sided weakness 08/15/2011    Glaucoma 08/15/2011    Postoperative hypothyroidism 08/15/2011    Anxiety and depression 08/15/2011     Prior Living Situation:  Housing / Facility: 2 Story House (stays on first floor)  Lives with - Patient's Self Care Capacity: Parents (mother)    Prior Level of Function:  Medication Management: Dependent  Finances: Independent  Home Management: Independent  Shopping:  Independent  Prior Level Of Mobility: Independent Without Device in Home  Driving / Transportation: Relatives / Others Provide Transportation    Support Systems:  Primary : Leslie Torres         Previous Services Utilized:   Equipment Owned: Grab Bar(s) In Tub / Shower, Grab Bar(s) By Toilet, Tub / Shower Seat  Prior Services: Home-Independent    Other Information:             Patient / Family Goal:       Plan:  1. Continue to follow patient through hospitalization and provide discharge planning in collaboration with patient, family, physicians and ancillary services.     2. Utilize community resources to ensure a safe discharge.

## 2023-02-28 NOTE — PROGRESS NOTES
4 Eyes Skin Assessment Completed by Jenna RAMIREZ RN and Yokasta NAIR RN.    Head WDL  Ears WDL  Nose WDL  Mouth WDL  Neck WDL  Breast/Chest WDL  Shoulder Blades WDL  Spine WDL  (R) Arm/Elbow/Hand WDL  (L) Arm/Elbow/Hand WDL  Abdomen WDL  Groin moist  Scrotum/Coccyx/Buttocks Redness and Blanching  (R) Leg WDL  (L) Leg WDL  (R) Heel/Foot/Toe dry/flaky  (L) Heel/Foot/Toe dry/flaky          Devices In Places n/a      Interventions In Place Pressure Redistribution Mattress    Possible Skin Injury No    Pictures Uploaded Into Epic N/A  Wound Consult Placed N/A  RN Wound Prevention Protocol Ordered No

## 2023-02-28 NOTE — FLOWSHEET NOTE
02/28/23 1246   Events/Summary/Plan   Events/Summary/Plan SpO2 check, MDI   Vital Signs   Pulse 60   Respiration 18   Pulse Oximetry 96 %   $ Pulse Oximetry (Spot Check) Yes   Oxygen   O2 Delivery Device None - Room Air

## 2023-02-28 NOTE — CARE PLAN
"Safety measures enforced, Seizure precautions observed, side rails padded. Reinforced impt of calling for assistance at all times.  Problem: Risk for Aspiration  Goal: Patient's risk for aspiration will be absent or decrease  Outcome: Progressing     Problem: Diabetes Management  Goal: Patient's ability to maintain appropriate glucose levels will be maintained or improve  Outcome: Progressing  Note: Finger stick monitored  Hs- 150, no coverage needed, no s/s of hypo and hyperglycemia noted.     Problem: Fall Risk - Rehab  Goal: Patient will remain free from falls  Outcome: Progressing  Note: Mariluz Ball Fall risk Assessment Score: 15    High fall risk Interventions   - Alarming seatbelt  - Wander guard  - Bed and strip alarm   - Yellow sign by the door   - Yellow wrist band \"Fall risk\"  - Room near to the nurse station  - Do not leave patient unattended in the bathroom  - Fall risk education provided     The patient is Stable     Progress made toward(s) clinical / shift goals:  Pt free from fall and injury.    Patient is not progressing towards the following goals:      "

## 2023-03-01 ENCOUNTER — APPOINTMENT (OUTPATIENT)
Dept: SPEECH THERAPY | Facility: REHABILITATION | Age: 43
DRG: 101 | End: 2023-03-01
Attending: PHYSICAL MEDICINE & REHABILITATION
Payer: MEDICAID

## 2023-03-01 ENCOUNTER — APPOINTMENT (OUTPATIENT)
Dept: OCCUPATIONAL THERAPY | Facility: REHABILITATION | Age: 43
DRG: 101 | End: 2023-03-01
Attending: PHYSICAL MEDICINE & REHABILITATION
Payer: MEDICAID

## 2023-03-01 ENCOUNTER — APPOINTMENT (OUTPATIENT)
Dept: PHYSICAL THERAPY | Facility: REHABILITATION | Age: 43
DRG: 101 | End: 2023-03-01
Attending: PHYSICAL MEDICINE & REHABILITATION
Payer: MEDICAID

## 2023-03-01 LAB
GLUCOSE BLD STRIP.AUTO-MCNC: 105 MG/DL (ref 65–99)
GLUCOSE BLD STRIP.AUTO-MCNC: 109 MG/DL (ref 65–99)
GLUCOSE BLD STRIP.AUTO-MCNC: 136 MG/DL (ref 65–99)
GLUCOSE BLD STRIP.AUTO-MCNC: 94 MG/DL (ref 65–99)

## 2023-03-01 PROCEDURE — 94640 AIRWAY INHALATION TREATMENT: CPT

## 2023-03-01 PROCEDURE — 97530 THERAPEUTIC ACTIVITIES: CPT

## 2023-03-01 PROCEDURE — 99232 SBSQ HOSP IP/OBS MODERATE 35: CPT | Performed by: HOSPITALIST

## 2023-03-01 PROCEDURE — 770010 HCHG ROOM/CARE - REHAB SEMI PRIVAT*

## 2023-03-01 PROCEDURE — 700102 HCHG RX REV CODE 250 W/ 637 OVERRIDE(OP): Performed by: PHYSICAL MEDICINE & REHABILITATION

## 2023-03-01 PROCEDURE — A9270 NON-COVERED ITEM OR SERVICE: HCPCS | Performed by: PHYSICAL MEDICINE & REHABILITATION

## 2023-03-01 PROCEDURE — 97110 THERAPEUTIC EXERCISES: CPT

## 2023-03-01 PROCEDURE — 97535 SELF CARE MNGMENT TRAINING: CPT

## 2023-03-01 PROCEDURE — 99232 SBSQ HOSP IP/OBS MODERATE 35: CPT | Performed by: PHYSICAL MEDICINE & REHABILITATION

## 2023-03-01 PROCEDURE — 700111 HCHG RX REV CODE 636 W/ 250 OVERRIDE (IP): Performed by: PHYSICAL MEDICINE & REHABILITATION

## 2023-03-01 PROCEDURE — 97129 THER IVNTJ 1ST 15 MIN: CPT

## 2023-03-01 PROCEDURE — 97130 THER IVNTJ EA ADDL 15 MIN: CPT

## 2023-03-01 PROCEDURE — 82962 GLUCOSE BLOOD TEST: CPT | Mod: 91

## 2023-03-01 PROCEDURE — 97116 GAIT TRAINING THERAPY: CPT

## 2023-03-01 RX ORDER — LISINOPRIL 5 MG/1
5 TABLET ORAL DAILY
Status: DISCONTINUED | OUTPATIENT
Start: 2023-03-02 | End: 2023-03-03

## 2023-03-01 RX ORDER — DAPAGLIFLOZIN 10 MG/1
5 TABLET, FILM COATED ORAL DAILY
Status: DISCONTINUED | OUTPATIENT
Start: 2023-03-02 | End: 2023-03-03

## 2023-03-01 RX ADMIN — Medication 4000 UNITS: at 20:57

## 2023-03-01 RX ADMIN — LISINOPRIL 10 MG: 5 TABLET ORAL at 08:13

## 2023-03-01 RX ADMIN — BUDESONIDE AND FORMOTEROL FUMARATE DIHYDRATE 2 PUFF: 160; 4.5 AEROSOL RESPIRATORY (INHALATION) at 20:54

## 2023-03-01 RX ADMIN — DAPAGLIFLOZIN 10 MG: 10 TABLET, FILM COATED ORAL at 08:14

## 2023-03-01 RX ADMIN — TOPIRAMATE 50 MG: 25 TABLET, FILM COATED ORAL at 20:57

## 2023-03-01 RX ADMIN — PRAZOSIN HYDROCHLORIDE 5 MG: 5 CAPSULE ORAL at 20:58

## 2023-03-01 RX ADMIN — METFORMIN HYDROCHLORIDE 1000 MG: 500 TABLET ORAL at 17:20

## 2023-03-01 RX ADMIN — LEVOTHYROXINE SODIUM 25 MCG: 0.03 TABLET ORAL at 05:41

## 2023-03-01 RX ADMIN — DORZOLAMIDE HYDROCHLORIDE AND TIMOLOL MALEATE 1 DROP: 20; 5 SOLUTION/ DROPS OPHTHALMIC at 08:20

## 2023-03-01 RX ADMIN — OMEPRAZOLE 20 MG: 20 CAPSULE, DELAYED RELEASE ORAL at 08:16

## 2023-03-01 RX ADMIN — LEVOTHYROXINE SODIUM 200 MCG: 0.12 TABLET ORAL at 05:40

## 2023-03-01 RX ADMIN — DIVALPROEX SODIUM 500 MG: 500 TABLET, DELAYED RELEASE ORAL at 08:16

## 2023-03-01 RX ADMIN — TOPIRAMATE 50 MG: 25 TABLET, FILM COATED ORAL at 08:15

## 2023-03-01 RX ADMIN — SERTRALINE HYDROCHLORIDE 150 MG: 50 TABLET, FILM COATED ORAL at 20:55

## 2023-03-01 RX ADMIN — DORZOLAMIDE HYDROCHLORIDE AND TIMOLOL MALEATE 1 DROP: 20; 5 SOLUTION/ DROPS OPHTHALMIC at 20:54

## 2023-03-01 RX ADMIN — FENOFIBRATE 134 MG: 67 CAPSULE ORAL at 08:16

## 2023-03-01 RX ADMIN — LEVETIRACETAM 1000 MG: 500 TABLET, FILM COATED ORAL at 20:54

## 2023-03-01 RX ADMIN — BUDESONIDE AND FORMOTEROL FUMARATE DIHYDRATE 2 PUFF: 160; 4.5 AEROSOL RESPIRATORY (INHALATION) at 12:28

## 2023-03-01 RX ADMIN — TRAZODONE HYDROCHLORIDE 100 MG: 100 TABLET ORAL at 20:57

## 2023-03-01 RX ADMIN — ENOXAPARIN SODIUM 40 MG: 40 INJECTION SUBCUTANEOUS at 08:16

## 2023-03-01 RX ADMIN — LEVETIRACETAM 1000 MG: 500 TABLET, FILM COATED ORAL at 08:15

## 2023-03-01 RX ADMIN — SITAGLIPTIN 100 MG: 50 TABLET, FILM COATED ORAL at 08:14

## 2023-03-01 RX ADMIN — LORATADINE 10 MG: 10 TABLET ORAL at 08:15

## 2023-03-01 RX ADMIN — TRAMADOL HYDROCHLORIDE 50 MG: 50 TABLET, COATED ORAL at 08:15

## 2023-03-01 RX ADMIN — DIVALPROEX SODIUM 1500 MG: 500 TABLET, DELAYED RELEASE ORAL at 20:56

## 2023-03-01 RX ADMIN — MONTELUKAST 10 MG: 10 TABLET, FILM COATED ORAL at 20:57

## 2023-03-01 RX ADMIN — ATORVASTATIN CALCIUM 20 MG: 10 TABLET, FILM COATED ORAL at 20:54

## 2023-03-01 RX ADMIN — METFORMIN HYDROCHLORIDE 1000 MG: 500 TABLET ORAL at 08:14

## 2023-03-01 ASSESSMENT — GAIT ASSESSMENTS
DEVIATION: STEP TO;INCREASED BASE OF SUPPORT;BRADYKINETIC;SHUFFLED GAIT;DECREASED HEEL STRIKE;DECREASED TOE OFF
ASSISTIVE DEVICE: PARALLEL BARS
GAIT LEVEL OF ASSIST: TOTAL ASSIST
DISTANCE (FEET): 10

## 2023-03-01 ASSESSMENT — ACTIVITIES OF DAILY LIVING (ADL)
TUB_SHOWER_TRANSFER_DESCRIPTION: GRAB BAR;SHOWER BENCH;SET-UP OF EQUIPMENT;SUPERVISION FOR SAFETY
BED_CHAIR_WHEELCHAIR_TRANSFER_DESCRIPTION: INCREASED TIME;INITIAL PREPARATION FOR TASK;SET-UP OF EQUIPMENT;SQUAT PIVOT TRANSFER TO WHEELCHAIR;SUPERVISION FOR SAFETY;VERBAL CUEING
TOILET_TRANSFER_DESCRIPTION: GRAB BAR;SET-UP OF EQUIPMENT;SUPERVISION FOR SAFETY
BED_CHAIR_WHEELCHAIR_TRANSFER_DESCRIPTION: INCREASED TIME;SET-UP OF EQUIPMENT;SQUAT PIVOT TRANSFER TO WHEELCHAIR;SUPERVISION FOR SAFETY;VERBAL CUEING

## 2023-03-01 ASSESSMENT — PAIN DESCRIPTION - PAIN TYPE: TYPE: ACUTE PAIN

## 2023-03-01 NOTE — THERAPY
"Recreational Therapy   Initial Evaluation     Patient Name: Norm Prabhakar  Age:  42 y.o., Sex:  male  Medical Record #: [id number]  Today's Date: 2/28/2023     Subjective    \"I've worked as an EMT for 5 years.\"     Objective       02/28/23 1531   Procedural Tracking   Procedural Tracking Community Skills Development;Community Re-Integration;Leisure Skills Awareness;Leisure Skills Development;Social Skills Training;Cognitive Skills Training;Gross Motor Functional Leisure Skills;Fine Motor Functional Leisure Skills   Treatment Time   Total Time Spent (mins) 20   Total Time Missed 10   Reasons for Time Missed Non-Medical-Patient  Refused   Leisure History   Leisure Interests Pets;Hobbies  (video games)   Pre-Morbid Leisure Lifestyle Individual;Sedentary   Prior Living Arrangements   Lives with - Patient's Self Care Capacity Parents   Steps Into Home 0   Steps In Home 0   Ambulation Independent   Assistive Devices Used None   Driving / Transportation Relatives / Others Provide Transportation   Functional Ability Status - Physical   Endurance Low   Right  Weak   Right Arm Weak   Right Leg Weak   Left Leg Weak   Functional Ability Status - Cognitive   Attention Span Remains on Task   Comprehension Follows One Step Commands   Judgment Able to Make Independent Decisions   Functional Ability Status - Emotional    Affect Flat   Mood Appropriate   Behavior Appropriate   Leisure Competence Measure   Leisure Awareness Minimal Assist   Leisure Attitude Minimal Assist   Leisure Skills Minimal Assist   Cultural / Social Behaviors Minimal Assist   Interpersonal Skills Minimal Assist   Community Integration Skills Minimal Assist   Social Contact Minimal Assist   Community Participation Minimal Assist   Clinical Impression   Clinical Impression Impaired Fine Motor Leisure Functioning;Impaired Gross Motor Leisure Functioning;Impaired Endurance;Impaired Cognitive Leisure Functioning;Impaired Group Interaction Skills;Impaired Leisure " Skills   Current Discharge Plan   Current Discharge Plan Return to Prior Living Situation   Benefit    Benefit Patient would benefit from inpatient Recreational Therapy interventions with a therapy tech under the direction of the Certified Therapeutic Recreation Therapist   Interdisciplinary Plan of Care Collaboration   Patient Position at End of Therapy Seated;In Bed;Bed Alarm On;Phone within Reach;Tray Table within Reach;Call Light within Reach   Strengths & Barriers   Strengths Alert and oriented;Willingly participates in therapeutic activities;Pleasant and cooperative   Barriers Confused;Decreased endurance;Generalized weakness;Impaired activity tolerance;Impaired functional cognition         Assessment  Patient is 42 y.o. male with a diagnosis of seizure disorder.  Additional factors influencing patient status / progress (ie: cognitive factors, co-morbidities, social support, etc): PMH of TBI, seizures, conversion disorder, PTSD, BPD and right sided weakness with previous CVA.        Plan  Recommend Recreational Therapy 30-60 minutes per day  1-2  days per week for 10 days for the following treatments:  Community Re-Integration, Community Skills Development, Leisure Skills Awareness, Leisure Skills Development, Social Skills Training, Cognitive Skills Training, Gross Motor Functional Leisure Skills, and Fine Motor Functional Leisure Skills    Passport items to be completed:  Verbalize two positive leisure activities, discuss returning to work, hobbies, community groups or volunteer activities, explore community resources     Goals:  Long term and short term goals have been discussed with patient and they are in agreement.    Recreation Therapy Problems (Active)       Problem: Recreation Therapy       Dates: Start: 02/28/23         Goal: STG-Within one week, patient will identify one new leisure interest.        Dates: Start: 02/28/23            Goal: LTG-By discharge, patient will identify two new leisure  interests.        Dates: Start: 02/28/23

## 2023-03-01 NOTE — CARE PLAN
The patient is Stable - Low risk of patient condition declining or worsening    Problem: Fall Risk - Rehab  Goal: Patient will remain free from falls  Outcome: Progressing     Problem: Pain - Standard  Goal: Alleviation of pain or a reduction in pain to the patient’s comfort goal  Outcome: Progressing

## 2023-03-01 NOTE — DIETARY
Nutrition Services: Diabetes Diet Education Class  Day 2 of admit.  Norm Prabhakar is a 42 y.o. male with admitting DX of Seizure disorder (HCC) [G40.799]    Pt attended bi-monthly diabetes diet education class. RD discussed food groups associated w/ CHO-containing foods, CHO servings of foods, reading nutrition facts labels to determine CHO servings provided per food serving, plate method for building balanced meals, symptoms of hypoglycemia and 15-15 rule, symptoms of hyperglycemia. RD provided handout reinforcing topics discussed, including sample menu.     Please re-consult RD as indicated.

## 2023-03-01 NOTE — PROGRESS NOTES
PSYCHOLOGICAL CONSULTATION:  Reason for admission: Seizure disorder (HCC) [G40.909]  Reason for consult: History of psychiatric disorder, adjustment to hospitalization  Requesting Physician: Luis    Legal status: Voluntary    Chief Complaint: Decreased functionality    HPI: Norm Prabhakar is a 42 y.o. male with a PMH of TBI, seizures, conversion disorder, PTSD, BPD and right sided weakness with previous CVA who presented on 2/17/23 with multiple seizure. He reportedly had 4 separate seizures. CT head was negative for acute process. MRI was negative. He reports missed some of his medication at home. He was started on Keppra in addition to his home medications and has since not had a seizure in 10 days. He was found to have large neck mass and biopsy was performed with pending pathology.      Psychology was consulted due to significant psychiatric history as well as difficulty with adjustment to hospitalization    Risk Assessment: current symptoms as reported by pt.  Suicidal Ideation: Denies    Homicidal Ideation: Denies      Psychiatric Review of Systems:current symptoms as reported by pt.  Depression: Endorses depressive symptoms beginning approximately 1 year ago following the dissolution of his long distance relationship and engagement.  Carolyn: Denies   Anxiety/Panic Attacks: Endorses mild anxiety symptoms in the context of hospitalization  PTSD symptom: Endorses history of PTSD diagnosis  Psychosis: Denies   Other:         Medical Review of Systems: as reported by pt. All systems reviewed. Only those found to be + are noted below. All others are negative.   Neurological:    TBIs: Endorses   SZs: Endorses   Strokes: Endorses   Other:    Other medical symptoms:   Thyroid: Endorses   Diabetes: Denies   Cardiovascular disease: Endorses    Past Psychiatric Hx: Depression, anxiety, PTSD, borderline personality disorder    Family Psychiatric Hx: None reported    Social Hx:   Social History     Socioeconomic  History    Marital status: Single   Tobacco Use    Smoking status: Former     Packs/day: 0.25     Types: Cigarettes, Cigars     Quit date: 2020     Years since quittin.9    Smokeless tobacco: Never    Tobacco comments:     3 cigarettes a day   Vaping Use    Vaping Use: Former   Substance and Sexual Activity    Alcohol use: Not Currently    Drug use: Yes     Types: Inhaled     Comment: marijuana     Social Determinants of Health     Transportation Needs: No Transportation Needs    Lack of Transportation (Medical): No    Lack of Transportation (Non-Medical): No     Medical Hx: Chart reviewed. Only those findings of potential interest to psychiatry are noted below:  Past Medical History:   Diagnosis Date    Anxiety     BIPOLAR    ASTHMA     Bipolar 1 disorder ([facility])     Depression     Diabetes ([facility])     Type II Diabetes    Fall     passed out 2 wks ago    Glaucoma     Glaucoma     both eyes/ blind on left eye    Hypothyroidism     Indigestion     once in a while    Mental disorder     learning disabilities; speech impairment; developmental delays    Murmur     since birth    Pneumonia     remote    Psychiatric problem     PTSD    S/P thyroidectomy     Seizure ([facility]) 2010    Seizure disorder ([facility])     Unspecified disorder of thyroid      Medical Conditions:     Allergies: Abilify, Fish, Geodon [ziprasidone hcl], Aripiprazole, and Ziprasidone  Medications (currently prescribed at [facility]):    Current Facility-Administered Medications:     [START ON 3/2/2023] dapagliflozin propanediol (Farxiga) tablet 5 mg, 5 mg, Oral, DAILY, Bria Echavarria M.D.    [START ON 3/2/2023] SITagliptin (JANUVIA) tablet 50 mg, 50 mg, Oral, DAILY, Bria Echavarria M.D.    [START ON 3/2/2023] lisinopril (PRINIVIL) tablet 5 mg, 5 mg, Oral, DAILY, Bria Echavarria M.D.    insulin regular (HumuLIN R,NovoLIN R) injection, 2-12 Units, Subcutaneous, 4X/DAY ACHS **AND** POC blood glucose manual result, , , Q AC AND BEDTIME(S) **AND** NOTIFY MD and PharmD, , ,  Once **AND** Administer 20 grams of glucose (approximately 8 ounces of fruit juice) every 15 minutes PRN FSBG less than 70 mg/dL, , , PRN **AND** dextrose 50% (D50W) injection 25 g, 25 g, Intravenous, Q15 MIN PRN, Bria Echavarria M.D.    insulin GLARGINE (Lantus,Semglee) injection, 32 Units, Subcutaneous, BID, Bria Echavarria M.D., 32 Units at 03/01/23 0817    Respiratory Therapy Consult, , Nebulization, Continuous RT, Karan Smith M.D.    hydrALAZINE (APRESOLINE) tablet 25 mg, 25 mg, Oral, Q8HRS PRN, Karan Smith M.D.    acetaminophen (Tylenol) tablet 650 mg, 650 mg, Oral, Q4HRS PRN, Karan Smith M.D.    senna-docusate (PERICOLACE or SENOKOT S) 8.6-50 MG per tablet 2 Tablet, 2 Tablet, Oral, BID, 2 Tablet at 02/28/23 2048 **AND** polyethylene glycol/lytes (MIRALAX) PACKET 1 Packet, 1 Packet, Oral, QDAY PRN **AND** magnesium hydroxide (MILK OF MAGNESIA) suspension 30 mL, 30 mL, Oral, QDAY PRN **AND** bisacodyl (DULCOLAX) suppository 10 mg, 10 mg, Rectal, QDAY PRN, Karan Smith M.D.    omeprazole (PRILOSEC) capsule 20 mg, 20 mg, Oral, DAILY, Karan Smith M.D., 20 mg at 03/01/23 0816    mag hydrox-al hydrox-simeth (MAALOX PLUS ES or MYLANTA DS) suspension 20 mL, 20 mL, Oral, Q2HRS PRN, Karan Smith M.D.    ondansetron (ZOFRAN ODT) dispertab 4 mg, 4 mg, Oral, 4X/DAY PRN **OR** ondansetron (ZOFRAN) syringe/vial injection 4 mg, 4 mg, Intramuscular, 4X/DAY PRN, Karan Smith M.D.    traZODone (DESYREL) tablet 50 mg, 50 mg, Oral, QHS PRN, Karan Smith M.D.    sodium chloride (OCEAN) 0.65 % nasal spray 2 Spray, 2 Spray, Nasal, PRN, Karan Smith M.D.    traMADol (Ultram) 50 MG tablet 50 mg, 50 mg, Oral, Q4HRS PRN, Karan Smith M.D., 50 mg at 03/01/23 0815    midazolam (VERSED) 5 mg/mL (1 mL vial), 5 mg, Nasal, PRN, Karan Smith M.D.    atorvastatin (LIPITOR) tablet 20 mg, 20 mg, Oral, Q EVENING, Karan Alexander  ROSA Smith, 20 mg at 02/28/23 2050    levothyroxine (SYNTHROID) tablet 200 mcg, 200 mcg, Oral, AM ES, Karan Smith M.D., 200 mcg at 03/01/23 0540    levothyroxine (SYNTHROID) tablet 25 mcg, 25 mcg, Oral, AM ES, Karan Smith M.D., 25 mcg at 03/01/23 0541    vitamin D3 (cholecalciferol) tablet 4,000 Units, 4,000 Units, Oral, Q EVENING, Karan Smith M.D., 4,000 Units at 02/28/23 2047    divalproex (DEPAKOTE) delayed-release tablet 1,500 mg, 1,500 mg, Oral, QHS, Karan Smith M.D., 1,500 mg at 02/28/23 2050    fenofibrate micronized (LOFIBRA) capsule 134 mg, 134 mg, Oral, DAILY, Karan Smith M.D., 134 mg at 03/01/23 0816    lurasidone (LATUDA) tablet 80 mg, 80 mg, Oral, PM MEAL, Karan Smith M.D., 80 mg at 02/28/23 1707    metFORMIN (GLUCOPHAGE) tablet 1,000 mg, 1,000 mg, Oral, BID WITH MEALS, Karan Smith M.D., 1,000 mg at 03/01/23 0814    montelukast (SINGULAIR) tablet 10 mg, 10 mg, Oral, Q EVENING, Karan Smith M.D., 10 mg at 02/28/23 2051    prazosin (MINIPRESS) capsule 5 mg, 5 mg, Oral, Q EVENING, Karan Smith M.D., 5 mg at 02/27/23 2135    sertraline (Zoloft) tablet 150 mg, 150 mg, Oral, QHS, Karan Smith M.D., 150 mg at 02/28/23 2050    levETIRAcetam (KEPPRA) tablet 1,000 mg, 1,000 mg, Oral, Q12HRS, Karan Smith M.D., 1,000 mg at 03/01/23 0815    divalproex (DEPAKOTE) delayed-release tablet 500 mg, 500 mg, Oral, DAILY, Karan Smith M.D., 500 mg at 03/01/23 0816    loratadine (CLARITIN) tablet 10 mg, 10 mg, Oral, DAILY, Karan Smith M.D., 10 mg at 03/01/23 0815    topiramate (TOPAMAX) tablet 50 mg, 50 mg, Oral, Q12HRS, Karan mSith M.D., 50 mg at 03/01/23 0815    traZODone (DESYREL) tablet 100 mg, 100 mg, Oral, QHS, Karan Smith M.D., 100 mg at 02/28/23 2051    dorzolamide-timolol (COSOPT) 22.3-6.8 MG/ML ophthalmic drops 1 Drop, 1  "Drop, Both Eyes, BID, Karan Smith M.D., 1 Drop at 03/01/23 0820    albuterol inhaler 2 Puff, 2 Puff, Inhalation, 4X/DAY PRN, Karan Smith M.D.    budesonide-formoterol (SYMBICORT) 160-4.5 MCG/ACT inhaler 2 Puff, 2 Puff, Inhalation, BID (RT), Karan Smith M.D., 2 Puff at 03/01/23 1228    enoxaparin (Lovenox) inj 40 mg, 40 mg, Subcutaneous, DAILY, Karan Smith M.D., 40 mg at 03/01/23 0816  Labs:  Recent Results (from the past 24 hour(s))   POCT glucose device results    Collection Time: 02/28/23  5:05 PM   Result Value Ref Range    POC Glucose, Blood 103 (H) 65 - 99 mg/dL   POCT glucose device results    Collection Time: 02/28/23  8:56 PM   Result Value Ref Range    POC Glucose, Blood 90 65 - 99 mg/dL   POCT glucose device results    Collection Time: 02/28/23  9:54 PM   Result Value Ref Range    POC Glucose, Blood 96 65 - 99 mg/dL   POCT glucose device results    Collection Time: 02/28/23 10:18 PM   Result Value Ref Range    POC Glucose, Blood 118 (H) 65 - 99 mg/dL   POCT glucose device results    Collection Time: 03/01/23  7:05 AM   Result Value Ref Range    POC Glucose, Blood 109 (H) 65 - 99 mg/dL   POCT glucose device results    Collection Time: 03/01/23 11:32 AM   Result Value Ref Range    POC Glucose, Blood 94 65 - 99 mg/dL          Cranial Imaging Hx: Brain MRI 2/18/2023 Impression    Redemonstration of severe diffuse white matter T2 FLAIR signal   hyperintensity without significant interval change.     No evidence of acute cerebrovascular infarction, hemorrhage or abnormal   enhancement.    Other:    Psychiatric Examination:  Vitals: Blood pressure 114/68, pulse 69, temperature 36.2 °C (97.2 °F), temperature source Oral, resp. rate 18, height 1.981 m (6' 6\"), weight 109 kg (240 lb), SpO2 97 %.  Musculoskeletal: Laying in bed normal psychomotor activity, no tics or unusual mannerisms noted  Appearance and Eye Contact: Easily established rapport WDWN, appropriate " "dress and grooming. Behavior is calm, cooperative,  appropriate eye contact  Attention/Alertness: Alert  Thought Process: Linear logical goal directed  Thought Content: No psychotic processes noted  Speech: Clear with normal rate and rhythm  Mood: \"Doing okay\"            Affect: Euthymic         SI/HI: Denies    Memory: Recent and remote memory appear intact      Orientation: Alert and oriented to person place and time  Insight into symptoms: Fair  Judgement into symptoms: Fair    Neuropsychological Testing:   Not formally tested          ASSESSMENT: Norm Prabhakar is a 42 y.o. male with a PMH of TBI, seizures, conversion disorder, PTSD, BPD and right sided weakness with previous CVA who presented on 2/17/23 with multiple seizure. He reportedly had 4 separate seizures. CT head was negative for acute process. MRI was negative. He reports missed some of his medication at home. He was started on Keppra in addition to his home medications and has since not had a seizure in 10 days. He was found to have large neck mass and biopsy was performed with pending pathology.      Psychology was consulted due to significant psychiatric history as well as difficulty with adjustment to hospitalization.  Psychology was consulted due to significant psychiatric history as well as unknown etiology of recent seizures.  Session focused on assessment of current functioning as well as psychoeducation regarding common cognitive and emotional impacts of hospitalization.  Patient reports recent struggles with depression and anxiety that have been longstanding but ongoing.  We will continue to follow through discharge to ensure appropriate engagement with therapies and medical staff.    DSM5 Diagnostic Considerations: Adjustment disorder with mixed anxiety and depressed mood      PLAN:  Records reviewed: Yes  Discussed patient with other provider: Luis  We will continue to follow  Thank you for the consult.    Gabrielle Cervantes, Ph.D. PhD   "

## 2023-03-01 NOTE — THERAPY
Physical Therapy   Daily Treatment     Patient Name: Norm Prabhakar  Age:  42 y.o., Sex:  male  Medical Record #: [id number]  Today's Date: 3/1/2023     Precautions  Precautions: Fall Risk, Other (See Comments)  Comments: seizure, R hemiparesis; possible conversion disorder    Subjective    Pt received resting in bed, agreeable to participate.     Objective       03/01/23 0701   PT Charge Group   PT Gait Training (Units) 2   PT Therapeutic Exercise (Units) 2   PT Total Time Spent   PT Individual Total Time Spent (Mins) 60   Gait Functional Level of Assist    Gait Level Of Assist Total Assist  (min A x1 with wc follow by this therapist)   Assistive Device Parallel Bars   Distance (Feet) 10   # of Times Distance was Traveled 2  (plus 8 ft)   Deviation Step To;Increased Base Of Support;Bradykinetic;Shuffled Gait;Decreased Heel Strike;Decreased Toe Off  (assist for WS over RLE)   Stairs Functional Level of Assist   Level of Assist with Stairs Unable to Participate   Transfer Functional Level of Assist   Bed, Chair, Wheelchair Transfer Minimal Assist   Bed Chair Wheelchair Transfer Description Increased time;Initial preparation for task;Set-up of equipment;Squat pivot transfer to wheelchair;Supervision for safety;Verbal cueing  (squat pivot to L)   Supine Lower Body Exercise   Bridges Two Legged;3 sets of 10  (with BLE on bolster for R hip comfort)   Knee to Chest 2 sets of 10;Left   Heel Slide 2 sets of 10;Right   Bed Mobility    Supine to Sit Standby Assist  (RLE hooked over LLE)   Sit to Supine Standby Assist  (RLE hooked over LLE)   Sit to Stand Contact Guard Assist  (pull to stand // bars)   Scooting Standby Assist  (in wc)   Interdisciplinary Plan of Care Collaboration   Patient Position at End of Therapy Seated;Chair Alarm On;Self Releasing Lap Belt Applied;Call Light within Reach;Tray Table within Reach;Phone within Reach         Assessment    Pt with slowly improving gait tolerance although remains limited by  fatigue and poor endurance. Has good potential to progress to CGA-SBA with squat pivot txs.    Strengths: Able to follow instructions, Alert and oriented, Effective communication skills, Independent prior level of function, Motivated for self care and independence, Pleasant and cooperative, Willingly participates in therapeutic activities  Barriers: Decreased endurance, Fatigue, Hemiparesis, Impaired activity tolerance, Impaired balance, Impaired functional cognition, Limited mobility (hx of TBI and CVA)    Plan    Gait short distances in // bars progressing to FWW, squat pivot progressing to stand pivot FWW txs, stairs/curb when appropriate, LE strengthening and endurance    Passport items to be completed:  Get in/out of bed safely, in/out of a vehicle, safely use mobility device, walk or wheel around home/community, navigate up and down stairs, show how to get up/down from the ground, ensure home is accessible, demonstrate HEP, complete caregiver training    Physical Therapy Problems (Active)       Problem: Mobility       Dates: Start: 02/28/23         Goal: STG-Within one week, patient will ambulate household distance 25 ft x2 with FWW and CGA.       Dates: Start: 02/28/23            Goal: STG-Within one week, patient will ambulate up/down a curb with FWW and min A.       Dates: Start: 02/28/23            Goal: STG-Within one week, patient will ambulate up/down flight of stairs with min A using BHR and RB prn.       Dates: Start: 02/28/23               Problem: Mobility Transfers       Dates: Start: 02/28/23         Goal: STG-Within one week, patient will transfer bed to chair with FWW and SBA.       Dates: Start: 02/28/23               Problem: PT-Long Term Goals       Dates: Start: 02/28/23         Goal: LTG-By discharge, patient will ambulate household distances at least 150 ft with FWW vs LRAD and SPV.       Dates: Start: 02/28/23            Goal: LTG-By discharge, patient will transfer one surface to another  with FWW vs LRAD and SPV.       Dates: Start: 02/28/23            Goal: LTG-By discharge, patient will ambulate up/down flight of stairs with BHR and SPV requiring no RB.       Dates: Start: 02/28/23            Goal: LTG-By discharge, patient will transfer in/out of a car with FWW vs LRAD and set up A.       Dates: Start: 02/28/23

## 2023-03-01 NOTE — THERAPY
Physical Therapy   Daily Treatment     Patient Name: Norm Prabhakar  Age:  42 y.o., Sex:  male  Medical Record #: [id number]  Today's Date: 3/1/2023     Precautions  Precautions: Fall Risk, Other (See Comments)  Comments: seizure, R hemiparesis; possible conversion disorder    Subjective    Pt received asleep in bed, easily woken and agreeable to participate. Reported that his clothes were in the dryer.     Objective       03/01/23 1431   PT Charge Group   PT Therapeutic Exercise (Units) 1   PT Therapeutic Activities (Units) 1   PT Total Time Spent   PT Individual Total Time Spent (Mins) 30   Wheelchair Functional Level of Assist   Wheelchair Assist Stand by Assist   Distance Wheelchair (Feet or Distance) 150   Wheelchair Description Extra time;Impaired coordination;Limited by fatigue;Supervision for safety;Verbal cueing  (BLE then BUE proplusion, required VC to use RUE)   Transfer Functional Level of Assist   Bed, Chair, Wheelchair Transfer Contact Guard Assist   Bed Chair Wheelchair Transfer Description Increased time;Set-up of equipment;Squat pivot transfer to wheelchair;Supervision for safety;Verbal cueing  (squat pivot no AD)   Sitting Lower Body Exercises   Sit to Stand   (x5 reps with FWW)   Bed Mobility    Supine to Sit Supervised   Sit to Supine Supervised   Sit to Stand Contact Guard Assist  (FWW)   Interdisciplinary Plan of Care Collaboration   IDT Collaboration with  Occupational Therapist   Patient Position at End of Therapy In Bed;Bed Alarm On;Call Light within Reach;Tray Table within Reach;Phone within Reach   Collaboration Comments CLOF     Fetched laundry out of dryer at wc level with SBA although required VC to lock wc brakes for safety.    Assessment    Pt with improving txs requiring dec assist. Limited by BLE fatigue during wc navigation but able to propel with BUE instead with VC to use RUE. Will likely be appropriate to progress ambulation to FWW and wc follow tomorrow.    Strengths: Able to  follow instructions, Alert and oriented, Effective communication skills, Independent prior level of function, Motivated for self care and independence, Pleasant and cooperative, Willingly participates in therapeutic activities  Barriers: Decreased endurance, Fatigue, Hemiparesis, Impaired activity tolerance, Impaired balance, Impaired functional cognition, Limited mobility (hx of TBI and CVA)    Plan    Gait short distances in // bars progressing to FWW, squat pivot progressing to stand pivot FWW txs, stairs/curb when appropriate, LE strengthening and endurance     Passport items to be completed:  Get in/out of bed safely, in/out of a vehicle, safely use mobility device, walk or wheel around home/community, navigate up and down stairs, show how to get up/down from the ground, ensure home is accessible, demonstrate HEP, complete caregiver training    Physical Therapy Problems (Active)       Problem: Mobility       Dates: Start: 02/28/23         Goal: STG-Within one week, patient will ambulate household distance 25 ft x2 with FWW and CGA.       Dates: Start: 02/28/23            Goal: STG-Within one week, patient will ambulate up/down a curb with FWW and min A.       Dates: Start: 02/28/23            Goal: STG-Within one week, patient will ambulate up/down flight of stairs with min A using BHR and RB prn.       Dates: Start: 02/28/23               Problem: Mobility Transfers       Dates: Start: 02/28/23         Goal: STG-Within one week, patient will transfer bed to chair with FWW and SBA.       Dates: Start: 02/28/23               Problem: PT-Long Term Goals       Dates: Start: 02/28/23         Goal: LTG-By discharge, patient will ambulate household distances at least 150 ft with FWW vs LRAD and SPV.       Dates: Start: 02/28/23            Goal: LTG-By discharge, patient will transfer one surface to another with FWW vs LRAD and SPV.       Dates: Start: 02/28/23            Goal: LTG-By discharge, patient will ambulate  up/down flight of stairs with BHR and SPV requiring no RB.       Dates: Start: 02/28/23            Goal: LTG-By discharge, patient will transfer in/out of a car with FWW vs LRAD and set up A.       Dates: Start: 02/28/23

## 2023-03-01 NOTE — CARE PLAN
Problem: Risk for Aspiration  Goal: Patient's risk for aspiration will be absent or decrease  Outcome: Progressing  Note: Aspiration precautions observed head of bed elevated during med pass, meds tolerated.     Problem: Bladder / Voiding  Goal: Patient will establish and maintain regular urinary output  Outcome: Progressing     Problem: Bowel Elimination  Goal: Patient will participate in bowel management program  Outcome: Progressing  Note: Assisted oob to bathroom, had bm to a large stool [ see I and O }.      Problem: Diabetes Management  Goal: Patient's ability to maintain appropriate glucose levels will be maintained or improve  Outcome: Progressing  Note: Finger stick monitored HS-90 , snacks provided , then increased to 95 , latest fs at 2219 hrs- 118, due glargine given[ see mar]. Pt encouraged to notify staff for any s/s of hypoglycemia , verbalized understanding. Cont monitored.   The patient is Watcher - Medium risk of patient condition declining or worsening     Progress made toward(s) clinical / shift goals:  Pt free from fall and injury.

## 2023-03-01 NOTE — PROGRESS NOTES
"  Physical Medicine & Rehabilitation Progress Note    Encounter Date: 3/1/2023    Chief Complaint: Decreased mobility, right sided weakness    Interval Events (Subjective):  Patient sitting up in room. He reports he ordered pizza for himself. Discussed about monitoring blood sugars. Will recheck labs tomorrow to recheck anemia. Denies NVD.     Objective:  VITAL SIGNS: /68   Pulse 69   Temp 36.2 °C (97.2 °F) (Oral)   Resp 18   Ht 1.981 m (6' 6\")   Wt 109 kg (240 lb)   SpO2 97%   BMI 27.73 kg/m²   Gen: NAD  Psych: Mood and affect appropriate  CV: RRR, 0 edema  Resp: CTAB, no upper airway sounds  Abd: NTND  Neuro: AOx4, able to put on right sock with right hand assistance    Laboratory Values:  Recent Results (from the past 72 hour(s))   POCT glucose device results    Collection Time: 02/27/23  5:13 PM   Result Value Ref Range    POC Glucose, Blood 159 (H) 65 - 99 mg/dL   POCT glucose device results    Collection Time: 02/27/23  9:06 PM   Result Value Ref Range    POC Glucose, Blood 150 (H) 65 - 99 mg/dL   CBC with Differential    Collection Time: 02/28/23  5:12 AM   Result Value Ref Range    WBC 8.8 4.8 - 10.8 K/uL    RBC 4.63 (L) 4.70 - 6.10 M/uL    Hemoglobin 13.9 (L) 14.0 - 18.0 g/dL    Hematocrit 42.1 42.0 - 52.0 %    MCV 90.9 81.4 - 97.8 fL    MCH 30.0 27.0 - 33.0 pg    MCHC 33.0 (L) 33.7 - 35.3 g/dL    RDW 40.1 35.9 - 50.0 fL    Platelet Count 278 164 - 446 K/uL    MPV 11.0 9.0 - 12.9 fL    Neutrophils-Polys 38.40 (L) 44.00 - 72.00 %    Lymphocytes 48.70 (H) 22.00 - 41.00 %    Monocytes 8.60 0.00 - 13.40 %    Eosinophils 2.30 0.00 - 6.90 %    Basophils 1.00 0.00 - 1.80 %    Immature Granulocytes 1.00 (H) 0.00 - 0.90 %    Nucleated RBC 0.00 /100 WBC    Neutrophils (Absolute) 3.39 1.82 - 7.42 K/uL    Lymphs (Absolute) 4.30 1.00 - 4.80 K/uL    Monos (Absolute) 0.76 0.00 - 0.85 K/uL    Eos (Absolute) 0.20 0.00 - 0.51 K/uL    Baso (Absolute) 0.09 0.00 - 0.12 K/uL    Immature Granulocytes (abs) 0.09 0.00 " - 0.11 K/uL    NRBC (Absolute) 0.00 K/uL   Comp Metabolic Panel (CMP)    Collection Time: 02/28/23  5:12 AM   Result Value Ref Range    Sodium 139 135 - 145 mmol/L    Potassium 4.0 3.6 - 5.5 mmol/L    Chloride 103 96 - 112 mmol/L    Co2 26 20 - 33 mmol/L    Anion Gap 10.0 7.0 - 16.0    Glucose 141 (H) 65 - 99 mg/dL    Bun 19 8 - 22 mg/dL    Creatinine 0.76 0.50 - 1.40 mg/dL    Calcium 9.1 8.5 - 10.5 mg/dL    AST(SGOT) 9 (L) 12 - 45 U/L    ALT(SGPT) 11 2 - 50 U/L    Alkaline Phosphatase 56 30 - 99 U/L    Total Bilirubin 0.2 0.1 - 1.5 mg/dL    Albumin 3.8 3.2 - 4.9 g/dL    Total Protein 5.8 (L) 6.0 - 8.2 g/dL    Globulin 2.0 1.9 - 3.5 g/dL    A-G Ratio 1.9 g/dL   HEMOGLOBIN A1C    Collection Time: 02/28/23  5:12 AM   Result Value Ref Range    Glycohemoglobin 7.8 (H) 4.0 - 5.6 %    Est Avg Glucose 177 mg/dL   TSH with Reflex to FT4    Collection Time: 02/28/23  5:12 AM   Result Value Ref Range    TSH 7.550 (H) 0.380 - 5.330 uIU/mL   Vitamin D, 25-hydroxy (blood)    Collection Time: 02/28/23  5:12 AM   Result Value Ref Range    25-Hydroxy   Vitamin D 25 35 30 - 100 ng/mL   CORRECTED CALCIUM    Collection Time: 02/28/23  5:12 AM   Result Value Ref Range    Correct Calcium 9.3 8.5 - 10.5 mg/dL   ESTIMATED GFR    Collection Time: 02/28/23  5:12 AM   Result Value Ref Range    GFR (CKD-EPI) 116 >60 mL/min/1.73 m 2   FREE THYROXINE    Collection Time: 02/28/23  5:12 AM   Result Value Ref Range    Free T-4 1.16 0.93 - 1.70 ng/dL   POCT glucose device results    Collection Time: 02/28/23  8:14 AM   Result Value Ref Range    POC Glucose, Blood 154 (H) 65 - 99 mg/dL   POCT glucose device results    Collection Time: 02/28/23 11:14 AM   Result Value Ref Range    POC Glucose, Blood 125 (H) 65 - 99 mg/dL   POCT glucose device results    Collection Time: 02/28/23  5:05 PM   Result Value Ref Range    POC Glucose, Blood 103 (H) 65 - 99 mg/dL   POCT glucose device results    Collection Time: 02/28/23  8:56 PM   Result Value Ref Range     POC Glucose, Blood 90 65 - 99 mg/dL   POCT glucose device results    Collection Time: 02/28/23  9:54 PM   Result Value Ref Range    POC Glucose, Blood 96 65 - 99 mg/dL   POCT glucose device results    Collection Time: 02/28/23 10:18 PM   Result Value Ref Range    POC Glucose, Blood 118 (H) 65 - 99 mg/dL   POCT glucose device results    Collection Time: 03/01/23  7:05 AM   Result Value Ref Range    POC Glucose, Blood 109 (H) 65 - 99 mg/dL   POCT glucose device results    Collection Time: 03/01/23 11:32 AM   Result Value Ref Range    POC Glucose, Blood 94 65 - 99 mg/dL       Medications:  Scheduled Medications   Medication Dose Frequency    insulin regular  2-12 Units 4X/DAY ACHS    insulin GLARGINE  32 Units BID    senna-docusate  2 Tablet BID    omeprazole  20 mg DAILY    atorvastatin  20 mg Q EVENING    levothyroxine  200 mcg AM ES    levothyroxine  25 mcg AM ES    vitamin D3  4,000 Units Q EVENING    divalproex  1,500 mg QHS    fenofibrate micronized  134 mg DAILY    lisinopril  10 mg DAILY    lurasidone  80 mg PM MEAL    metformin  1,000 mg BID WITH MEALS    montelukast  10 mg Q EVENING    prazosin  5 mg Q EVENING    sertraline  150 mg QHS    SITagliptin  100 mg DAILY    levETIRAcetam  1,000 mg Q12HRS    divalproex  500 mg DAILY    loratadine  10 mg DAILY    topiramate  50 mg Q12HRS    traZODone  100 mg QHS    dorzolamide-timolol  1 Drop BID    budesonide-formoterol  2 Puff BID (RT)    enoxaparin (LOVENOX) injection  40 mg DAILY    dapagliflozin propanediol  10 mg DAILY     PRN medications: insulin regular **AND** POC blood glucose manual result **AND** NOTIFY MD and PharmD **AND** Administer 20 grams of glucose (approximately 8 ounces of fruit juice) every 15 minutes PRN FSBG less than 70 mg/dL **AND** dextrose bolus, Respiratory Therapy Consult, hydrALAZINE, acetaminophen, senna-docusate **AND** polyethylene glycol/lytes **AND** magnesium hydroxide **AND** bisacodyl, mag hydrox-al hydrox-simeth, ondansetron  **OR** ondansetron, traZODone, sodium chloride, traMADol, midazolam, albuterol    Diet:  Current Diet Order   Procedures    Diet Order Diet: Consistent CHO (Diabetic)       Assessment:  Active Hospital Problems    Diagnosis     *Seizure disorder (HCC)     Acute right-sided weakness     Primary hypertension     Mixed hyperlipidemia     Type 2 diabetes mellitus without complication, without long-term current use of insulin (HCC)     PTSD (post-traumatic stress disorder)     Neck mass     Postoperative hypothyroidism     Anxiety and depression        Medical Decision Making and Plan:  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  -PT and OT for mobility and ADLs. Per guidelines, 15 hours per week between PT, OT and SLP.  -Follow-up APAP/Tramadol  -Continue Keppra and Depakote     HTN - Patient on Lisinopril 10 mg     DM2 with hyperglycemia - Patient on Lantus and SSI as well as metformin, Januvia. Blood sugars elevated, consult hospitalist. A1c 7.8. Ongoing hyperglycemia. Continue Lantus and SSI     HLD - Patient on Atorvastatin 20 mg QHS and Lofibra     Glaucoma - Patient on Cosopt      BPD/Mood disorder - Patient on Depakote 500/1500, Prazosin 5 mg daily, Sertraline 150 mg, Topamax 50 mg BID, Latuda 80 mg QHS and Trazodone 100 mg   -Consult Neuropsychology. Has history of suicide attempts per chart reviewed. Discussed case with Dr. Cervantes     Anemia - 13.9 on admission, repeat 3/2    Hypothyroidism - Patient on 225 mcg of Levothyroxine. TSH elevated but T4 normal.      Asthma - Patient on Singulair, Claritin, and Symbicort     Pain - APAP/Tramadol PRN     Skin - Patient at risk for skin breakdown due to debility in areas including sacrum, achilles, elbows and head in addition to other sites. Nursing to assess skin daily.     GI Ppx - Patient on Prilosec for GERD prophylaxis. Patient on  Senna-docusate for constipation prophylaxis.      DVT Ppx - Patient Lovenox on transfer.    ____________________________________    T. Benjamin Smith MD./PhD.  [facility] - Physical Medicine & Rehabilitation   [facility] - Brain Injury Medicine   ____________________________________

## 2023-03-01 NOTE — THERAPY
Occupational Therapy  Daily Treatment     Patient Name: Norm Prabhakar  Age:  42 y.o., Sex:  male  Medical Record #: [id number]  Today's Date: 3/1/2023     Precautions  Precautions: (P) Fall Risk, Other (See Comments)  Comments: (P) seizure, R hemiparesis; possible conversion disorder         Subjective    Patient had just finished attending a class and was agreeable to OT.  He stated he needed to check at the front lobby desk to see if his pizzas were delivered from [employer].     Objective       03/01/23 1031   OT Charge Group   OT Self Care / ADL (Units) 3   OT Therapy Activity (Units) 1   OT Total Time Spent   OT Individual Total Time Spent (Mins) 60   Precautions   Precautions Fall Risk;Other (See Comments)   Comments seizure, R hemiparesis; possible conversion disorder   Functional Level of Assist   Eating Independent   Grooming Supervision;Seated   Bathing Contact Guard Assist   Bathing Description Grab bar;Hand held shower;Tub bench;Set-up of equipment;Supervision for safety;Verbal cueing  (cues to incorporate R UE)   Upper Body Dressing Supervision   Upper Body Dressing Description Supervision for safety  (to doff pullover shirt)   Lower Body Dressing Contact Guard Assist   Lower Body Dressing Description Set-up of equipment;Supervision for safety;Verbal cueing  (CGA with cues to incorporate R UE)   Toilet Transfers Standby Assist   Toilet Transfer Description Grab bar;Set-up of equipment;Supervision for safety   Tub / Shower Transfers Standby Assist   Tub Shower Transfer Description Grab bar;Shower bench;Set-up of equipment;Supervision for safety   IADL Treatments   IADL Treatments Home management   Home Management Patient stood at washing machine and loaded dirty clothes with verbal cue to use both hands to separate underwear from pants.  He sat down to load soap in machine, then stood back up with SBA to start cycle.   Interdisciplinary Plan of Care Collaboration   IDT Collaboration with  Physician    Patient Position at End of Therapy Seated;Call Light within Reach;Tray Table within Reach;Phone within Reach;Self Releasing Lap Belt Applied;Chair Alarm On   Collaboration Comments Dr Smith rounded with patient         Assessment    Patient demonstrated improvements in transfers, dressing and bathing today.  He used R UE volitionally at times during ADL routine, but also required cues to incorporate it during B tasks at times.    Strengths: Able to follow instructions, Effective communication skills, Motivated for self care and independence, Manages pain appropriately, Pleasant and cooperative, Supportive family, Willingly participates in therapeutic activities, Alert and oriented, Independent prior level of function  Barriers: Decreased endurance, Hemiparesis, Impaired activity tolerance, Impaired balance, Limited mobility    Plan    ADLs, IADLs, transfers, functional mobility, R UE AROM/strengthening, standing tolerance/balance    Occupational Therapy Goals (Active)       Problem: Bathing       Dates: Start: 02/28/23         Goal: STG-Within one week, patient will bathe with SBA using grab bars as needed.        Dates: Start: 02/28/23               Problem: Dressing       Dates: Start: 02/28/23         Goal: STG-Within one week, patient will dress LB with mod A.       Dates: Start: 02/28/23               Problem: Functional Transfers       Dates: Start: 02/28/23         Goal: STG-Within one week, patient will transfer to toilet with min A.       Dates: Start: 02/28/23               Problem: OT Long Term Goals       Dates: Start: 02/28/23         Goal: LTG-By discharge, patient will complete basic self care tasks with supervision.       Dates: Start: 02/28/23            Goal: LTG-By discharge, patient will perform bathroom transfers with supervision.       Dates: Start: 02/28/23            Goal: LTG-By discharge, patient will complete basic home management with supervision.        Dates: Start: 02/28/23

## 2023-03-02 ENCOUNTER — APPOINTMENT (OUTPATIENT)
Dept: PHYSICAL THERAPY | Facility: REHABILITATION | Age: 43
DRG: 101 | End: 2023-03-02
Attending: PHYSICAL MEDICINE & REHABILITATION
Payer: MEDICAID

## 2023-03-02 ENCOUNTER — APPOINTMENT (OUTPATIENT)
Dept: SPEECH THERAPY | Facility: REHABILITATION | Age: 43
DRG: 101 | End: 2023-03-02
Attending: PHYSICAL MEDICINE & REHABILITATION
Payer: MEDICAID

## 2023-03-02 ENCOUNTER — APPOINTMENT (OUTPATIENT)
Dept: OCCUPATIONAL THERAPY | Facility: REHABILITATION | Age: 43
DRG: 101 | End: 2023-03-02
Attending: PHYSICAL MEDICINE & REHABILITATION
Payer: MEDICAID

## 2023-03-02 LAB
ANION GAP SERPL CALC-SCNC: 12 MMOL/L (ref 7–16)
BASOPHILS # BLD AUTO: 0.8 % (ref 0–1.8)
BASOPHILS # BLD: 0.06 K/UL (ref 0–0.12)
BUN SERPL-MCNC: 28 MG/DL (ref 8–22)
CALCIUM SERPL-MCNC: 9.2 MG/DL (ref 8.5–10.5)
CHLORIDE SERPL-SCNC: 105 MMOL/L (ref 96–112)
CO2 SERPL-SCNC: 23 MMOL/L (ref 20–33)
CREAT SERPL-MCNC: 0.95 MG/DL (ref 0.5–1.4)
EOSINOPHIL # BLD AUTO: 0.14 K/UL (ref 0–0.51)
EOSINOPHIL NFR BLD: 1.8 % (ref 0–6.9)
ERYTHROCYTE [DISTWIDTH] IN BLOOD BY AUTOMATED COUNT: 41.6 FL (ref 35.9–50)
GFR SERPLBLD CREATININE-BSD FMLA CKD-EPI: 103 ML/MIN/1.73 M 2
GLUCOSE BLD STRIP.AUTO-MCNC: 113 MG/DL (ref 65–99)
GLUCOSE BLD STRIP.AUTO-MCNC: 128 MG/DL (ref 65–99)
GLUCOSE BLD STRIP.AUTO-MCNC: 73 MG/DL (ref 65–99)
GLUCOSE BLD STRIP.AUTO-MCNC: 77 MG/DL (ref 65–99)
GLUCOSE SERPL-MCNC: 109 MG/DL (ref 65–99)
HCT VFR BLD AUTO: 40.1 % (ref 42–52)
HGB BLD-MCNC: 13.1 G/DL (ref 14–18)
IMM GRANULOCYTES # BLD AUTO: 0.07 K/UL (ref 0–0.11)
IMM GRANULOCYTES NFR BLD AUTO: 0.9 % (ref 0–0.9)
LYMPHOCYTES # BLD AUTO: 3.5 K/UL (ref 1–4.8)
LYMPHOCYTES NFR BLD: 45.4 % (ref 22–41)
MAGNESIUM SERPL-MCNC: 1.8 MG/DL (ref 1.5–2.5)
MCH RBC QN AUTO: 30.1 PG (ref 27–33)
MCHC RBC AUTO-ENTMCNC: 32.7 G/DL (ref 33.7–35.3)
MCV RBC AUTO: 92.2 FL (ref 81.4–97.8)
MONOCYTES # BLD AUTO: 0.77 K/UL (ref 0–0.85)
MONOCYTES NFR BLD AUTO: 10 % (ref 0–13.4)
NEUTROPHILS # BLD AUTO: 3.17 K/UL (ref 1.82–7.42)
NEUTROPHILS NFR BLD: 41.1 % (ref 44–72)
NRBC # BLD AUTO: 0 K/UL
NRBC BLD-RTO: 0 /100 WBC
PLATELET # BLD AUTO: 279 K/UL (ref 164–446)
PMV BLD AUTO: 11.1 FL (ref 9–12.9)
POTASSIUM SERPL-SCNC: 3.9 MMOL/L (ref 3.6–5.5)
RBC # BLD AUTO: 4.35 M/UL (ref 4.7–6.1)
SODIUM SERPL-SCNC: 140 MMOL/L (ref 135–145)
WBC # BLD AUTO: 7.7 K/UL (ref 4.8–10.8)

## 2023-03-02 PROCEDURE — 94760 N-INVAS EAR/PLS OXIMETRY 1: CPT

## 2023-03-02 PROCEDURE — 700111 HCHG RX REV CODE 636 W/ 250 OVERRIDE (IP): Performed by: PHYSICAL MEDICINE & REHABILITATION

## 2023-03-02 PROCEDURE — 700102 HCHG RX REV CODE 250 W/ 637 OVERRIDE(OP): Performed by: PHYSICAL MEDICINE & REHABILITATION

## 2023-03-02 PROCEDURE — 99232 SBSQ HOSP IP/OBS MODERATE 35: CPT | Performed by: HOSPITALIST

## 2023-03-02 PROCEDURE — 82962 GLUCOSE BLOOD TEST: CPT | Mod: 91

## 2023-03-02 PROCEDURE — 85025 COMPLETE CBC W/AUTO DIFF WBC: CPT

## 2023-03-02 PROCEDURE — 97530 THERAPEUTIC ACTIVITIES: CPT

## 2023-03-02 PROCEDURE — 83735 ASSAY OF MAGNESIUM: CPT

## 2023-03-02 PROCEDURE — 97129 THER IVNTJ 1ST 15 MIN: CPT

## 2023-03-02 PROCEDURE — 80048 BASIC METABOLIC PNL TOTAL CA: CPT

## 2023-03-02 PROCEDURE — 700102 HCHG RX REV CODE 250 W/ 637 OVERRIDE(OP): Performed by: HOSPITALIST

## 2023-03-02 PROCEDURE — 97110 THERAPEUTIC EXERCISES: CPT

## 2023-03-02 PROCEDURE — 97112 NEUROMUSCULAR REEDUCATION: CPT

## 2023-03-02 PROCEDURE — 99233 SBSQ HOSP IP/OBS HIGH 50: CPT | Performed by: PHYSICAL MEDICINE & REHABILITATION

## 2023-03-02 PROCEDURE — 97116 GAIT TRAINING THERAPY: CPT

## 2023-03-02 PROCEDURE — A9270 NON-COVERED ITEM OR SERVICE: HCPCS | Performed by: HOSPITALIST

## 2023-03-02 PROCEDURE — 770010 HCHG ROOM/CARE - REHAB SEMI PRIVAT*

## 2023-03-02 PROCEDURE — A9270 NON-COVERED ITEM OR SERVICE: HCPCS | Performed by: PHYSICAL MEDICINE & REHABILITATION

## 2023-03-02 PROCEDURE — 36415 COLL VENOUS BLD VENIPUNCTURE: CPT

## 2023-03-02 PROCEDURE — 97130 THER IVNTJ EA ADDL 15 MIN: CPT

## 2023-03-02 PROCEDURE — 94640 AIRWAY INHALATION TREATMENT: CPT

## 2023-03-02 RX ADMIN — LORATADINE 10 MG: 10 TABLET ORAL at 08:27

## 2023-03-02 RX ADMIN — LEVETIRACETAM 1000 MG: 500 TABLET, FILM COATED ORAL at 21:49

## 2023-03-02 RX ADMIN — DIVALPROEX SODIUM 500 MG: 500 TABLET, DELAYED RELEASE ORAL at 08:27

## 2023-03-02 RX ADMIN — METFORMIN HYDROCHLORIDE 1000 MG: 500 TABLET ORAL at 08:28

## 2023-03-02 RX ADMIN — LEVOTHYROXINE SODIUM 25 MCG: 0.03 TABLET ORAL at 06:08

## 2023-03-02 RX ADMIN — BUDESONIDE AND FORMOTEROL FUMARATE DIHYDRATE 2 PUFF: 160; 4.5 AEROSOL RESPIRATORY (INHALATION) at 08:05

## 2023-03-02 RX ADMIN — LEVOTHYROXINE SODIUM 200 MCG: 0.12 TABLET ORAL at 06:08

## 2023-03-02 RX ADMIN — METFORMIN HYDROCHLORIDE 500 MG: 500 TABLET ORAL at 17:20

## 2023-03-02 RX ADMIN — MONTELUKAST 10 MG: 10 TABLET, FILM COATED ORAL at 21:52

## 2023-03-02 RX ADMIN — OMEPRAZOLE 20 MG: 20 CAPSULE, DELAYED RELEASE ORAL at 08:28

## 2023-03-02 RX ADMIN — TOPIRAMATE 50 MG: 25 TABLET, FILM COATED ORAL at 21:49

## 2023-03-02 RX ADMIN — LISINOPRIL 5 MG: 5 TABLET ORAL at 08:28

## 2023-03-02 RX ADMIN — TRAZODONE HYDROCHLORIDE 100 MG: 100 TABLET ORAL at 21:48

## 2023-03-02 RX ADMIN — DAPAGLIFLOZIN 5 MG: 10 TABLET, FILM COATED ORAL at 08:27

## 2023-03-02 RX ADMIN — FENOFIBRATE 134 MG: 67 CAPSULE ORAL at 08:28

## 2023-03-02 RX ADMIN — ATORVASTATIN CALCIUM 20 MG: 10 TABLET, FILM COATED ORAL at 21:50

## 2023-03-02 RX ADMIN — LEVETIRACETAM 1000 MG: 500 TABLET, FILM COATED ORAL at 08:27

## 2023-03-02 RX ADMIN — SERTRALINE HYDROCHLORIDE 150 MG: 50 TABLET, FILM COATED ORAL at 21:52

## 2023-03-02 RX ADMIN — DORZOLAMIDE HYDROCHLORIDE AND TIMOLOL MALEATE 1 DROP: 20; 5 SOLUTION/ DROPS OPHTHALMIC at 22:09

## 2023-03-02 RX ADMIN — Medication 4000 UNITS: at 21:52

## 2023-03-02 RX ADMIN — DIVALPROEX SODIUM 1500 MG: 500 TABLET, DELAYED RELEASE ORAL at 21:49

## 2023-03-02 RX ADMIN — TOPIRAMATE 50 MG: 25 TABLET, FILM COATED ORAL at 08:27

## 2023-03-02 RX ADMIN — DORZOLAMIDE HYDROCHLORIDE AND TIMOLOL MALEATE 1 DROP: 20; 5 SOLUTION/ DROPS OPHTHALMIC at 08:31

## 2023-03-02 RX ADMIN — LURASIDONE HYDROCHLORIDE 80 MG: 20 TABLET, FILM COATED ORAL at 17:21

## 2023-03-02 RX ADMIN — BUDESONIDE AND FORMOTEROL FUMARATE DIHYDRATE 2 PUFF: 160; 4.5 AEROSOL RESPIRATORY (INHALATION) at 22:07

## 2023-03-02 RX ADMIN — ENOXAPARIN SODIUM 40 MG: 40 INJECTION SUBCUTANEOUS at 08:32

## 2023-03-02 RX ADMIN — SITAGLIPTIN 50 MG: 50 TABLET, FILM COATED ORAL at 08:28

## 2023-03-02 ASSESSMENT — PAIN DESCRIPTION - PAIN TYPE: TYPE: ACUTE PAIN

## 2023-03-02 ASSESSMENT — ACTIVITIES OF DAILY LIVING (ADL)
BED_CHAIR_WHEELCHAIR_TRANSFER_DESCRIPTION: ADAPTIVE EQUIPMENT;INCREASED TIME;SET-UP OF EQUIPMENT
BED_CHAIR_WHEELCHAIR_TRANSFER_DESCRIPTION: ADAPTIVE EQUIPMENT;INCREASED TIME;SET-UP OF EQUIPMENT

## 2023-03-02 ASSESSMENT — GAIT ASSESSMENTS
DEVIATION: STEP TO;BRADYKINETIC;SHUFFLED GAIT
DEVIATION: STEP TO;BRADYKINETIC;SHUFFLED GAIT;DECREASED HEEL STRIKE;DECREASED TOE OFF
GAIT LEVEL OF ASSIST: CONTACT GUARD ASSIST
GAIT LEVEL OF ASSIST: MINIMAL ASSIST
ASSISTIVE DEVICE: FRONT WHEEL WALKER
DISTANCE (FEET): 20
ASSISTIVE DEVICE: FRONT WHEEL WALKER
DISTANCE (FEET): 15

## 2023-03-02 NOTE — THERAPY
Physical Therapy   Daily Treatment     Patient Name: Norm Prabhakar  Age:  42 y.o., Sex:  male  Medical Record #: [id number]  Today's Date: 3/2/2023     Precautions  Precautions: Fall Risk, Other (See Comments)  Comments: seizure, R hemiparesis; possible conversion disorder    Subjective    Pt resting in bed, willing to participate, requesting some assist to get dressed.     Objective       03/02/23 0931   PT Charge Group   PT Gait Training (Units) 1   PT Therapeutic Activities (Units) 1   PT Total Time Spent   PT Individual Total Time Spent (Mins) 30   Gait Functional Level of Assist    Gait Level Of Assist Minimal Assist  (wc in tow by this PT)   Assistive Device Front Wheel Walker   Distance (Feet) 20   # of Times Distance was Traveled 2   Deviation Step To;Bradykinetic;Shuffled Gait   Transfer Functional Level of Assist   Bed, Chair, Wheelchair Transfer Contact Guard Assist   Bed Chair Wheelchair Transfer Description Adaptive equipment;Increased time;Set-up of equipment   Bed Mobility    Supine to Sit Supervised   Sit to Stand Contact Guard Assist     Pt demonstrated good sitting balance edge of bed during dressing tasks, CGA for standing balance with FWW for pulling up pants.     Assessment    Pt able to progress gait to FWW but with limited endurance and significant bradykinesia throughout activity.    Strengths: Able to follow instructions, Alert and oriented, Effective communication skills, Independent prior level of function, Motivated for self care and independence, Pleasant and cooperative, Willingly participates in therapeutic activities  Barriers: Decreased endurance, Fatigue, Hemiparesis, Impaired activity tolerance, Impaired balance, Impaired functional cognition, Limited mobility (hx of TBI and CVA)    Plan    Gait short distances in // bars progressing to FWW, squat pivot progressing to stand pivot FWW txs, stairs/curb when appropriate, LE strengthening and endurance     Passport items to be  completed:  Get in/out of bed safely, in/out of a vehicle, safely use mobility device, walk or wheel around home/community, navigate up and down stairs, show how to get up/down from the ground, ensure home is accessible, demonstrate HEP, complete caregiver training      Physical Therapy Problems (Active)       Problem: Mobility       Dates: Start: 02/28/23         Goal: STG-Within one week, patient will ambulate household distance 25 ft x2 with FWW and CGA.       Dates: Start: 02/28/23            Goal: STG-Within one week, patient will ambulate up/down a curb with FWW and min A.       Dates: Start: 02/28/23            Goal: STG-Within one week, patient will ambulate up/down flight of stairs with min A using BHR and RB prn.       Dates: Start: 02/28/23               Problem: Mobility Transfers       Dates: Start: 02/28/23         Goal: STG-Within one week, patient will transfer bed to chair with FWW and SBA.       Dates: Start: 02/28/23               Problem: PT-Long Term Goals       Dates: Start: 02/28/23         Goal: LTG-By discharge, patient will ambulate household distances at least 150 ft with FWW vs LRAD and SPV.       Dates: Start: 02/28/23            Goal: LTG-By discharge, patient will transfer one surface to another with FWW vs LRAD and SPV.       Dates: Start: 02/28/23            Goal: LTG-By discharge, patient will ambulate up/down flight of stairs with BHR and SPV requiring no RB.       Dates: Start: 02/28/23            Goal: LTG-By discharge, patient will transfer in/out of a car with FWW vs LRAD and set up A.       Dates: Start: 02/28/23

## 2023-03-02 NOTE — DISCHARGE PLANNING
Case Management/IDT follow up.   IDT continues to recommend IRF level of care as patient continue to make progress with all therapies.   Projected dc date set for 3/11/23.      DC needs:  Recommendations made for outpatient  for PT/OT/SLP  Follow up with: PCP    Met with pt / family providing update from IDT and discussed plan of care.    Plan:  Continue to follow

## 2023-03-02 NOTE — THERAPY
Speech Language Pathology  Daily Treatment     Patient Name: Norm Prabhakar  Age:  42 y.o., Sex:  male  Medical Record #: [id number]  Today's Date: 3/1/2023     Precautions  Precautions: Fall Risk, Other (See Comments)  Comments: seizure, R hemiparesis; possible conversion disorder    Subjective    Patient in bed.  Agreeable to tx at bedside.  He has 2 pizzas at bedside that he ordered from outside facility with debit card.     Objective       03/01/23 1331   Treatment Charges   SLP Cognitive Skill Development First 15 Minutes 1   SLP Cognitive Skill Development Additional 15 Minutes 3   SLP Total Time Spent   SLP Individual Total Time Spent (Mins) 60   Written Language Expression   Dominant Hand Left   Cognition   Cognitive-Linguistic (WDL) X   Moderate Attention Minimal (4)   Functional Memory Activities Moderate (3)   Functional Level of Assist   Comprehension Supervision   Comprehension Description Glasses;Verbal cues   Expression Modified Independent   Expression Description Other (comment)  (extra time)   Social Interaction Modified Independent   Social Interaction Description Medication   Problem Solving Minimal Assist   Problem Solving Description Increased time;Seat belt;Supervision;Bed/chair alarm;Therapy schedule;Verbal cueing   Memory Moderate Assist   Memory Description Increased time;Seat belt;Supervision;Bed/chair alarm;Therapy schedule;Verbal cueing         Assessment    Educated patient on the carbohydrate levels of multiple pieces of pizza and that this would likely cause an adverse effect on his blood sugars.  Patient targeted executive function and attention in the context of ( counting the's task). When asked he did not offer predictions of performance, awareness of potential challenges during task, or possible compensations.   He located 10/15 targets with min A to locate 14/15.  Patient did recognize barriers to accuracy and potential compensations once having completed task.    Strengths: Able  to follow instructions, Alert and oriented, Supportive family, Willingly participates in therapeutic activities  Barriers: Impaired carryover of learning, Impaired insight/denial of deficits, Impaired functional cognition    Plan    Target attention, recall, reasoning.    Passport items to be completed:  Express basic needs, understand food/liquid recommendations, consistently follow swallow precautions, manage finances, manage medications, arrive to therapy appointments on time, complete daily memory log entries, solve problems related to safety situations, review education related to hospitalization, complete caregiver training     Speech Therapy Problems (Active)       Problem: Memory STGs       Dates: Start: 02/28/23         Goal: STG-Within one week, patient will recall new training, daily events with 75% acc with min A.       Dates: Start: 02/28/23            Goal: STG-Within one week, patient will       Dates: Start: 02/28/23               Problem: Problem Solving STGs       Dates: Start: 02/28/23         Goal: STG-Within one week, patient will perform selective attention tasks with 75% acc with min A.       Dates: Start: 02/28/23               Problem: Speech/Swallowing LTGs       Dates: Start: 02/28/23         Goal: LTG-By discharge, patient will solve basic problems and recall safety training with 80% acc with min A.       Dates: Start: 02/28/23

## 2023-03-02 NOTE — THERAPY
Physical Therapy   Daily Treatment     Patient Name: Norm Prabhakar  Age:  42 y.o., Sex:  male  Medical Record #: [id number]  Today's Date: 3/2/2023     Precautions  Precautions: Fall Risk, Other (See Comments)  Comments: seizure, R hemiparesis; possible conversion disorder    Subjective    Pt resting in bed, willing to participate.     Objective       03/02/23 1401   PT Charge Group   PT Gait Training (Units) 1   PT Therapeutic Exercise (Units) 1   PT Total Time Spent   PT Individual Total Time Spent (Mins) 30   Gait Functional Level of Assist    Gait Level Of Assist Contact Guard Assist  (wc<>mat table)   Assistive Device Front Wheel Walker   Distance (Feet) 15   # of Times Distance was Traveled 2   Deviation Step To;Bradykinetic;Shuffled Gait;Decreased Heel Strike;Decreased Toe Off   Transfer Functional Level of Assist   Bed, Chair, Wheelchair Transfer Standby Assist   Bed Chair Wheelchair Transfer Description Adaptive equipment;Increased time;Set-up of equipment  (reach-pivot with bed rail)   Sitting Lower Body Exercises   Sitting Lower Body Exercises Yes   Hip Flexion 2 sets of 10   Hip Abduction 2 sets of 10   Hip Adduction 2 sets of 10   Long Arc Quad 2 sets of 10   Hamstring Curl 2 sets of 10   Comments manual cues to RLE for full ROM and increased speed on initiation   Bed Mobility    Supine to Sit Supervised   Sit to Supine Supervised   Sit to Stand Contact Guard Assist         Assessment    Pt was able to progress gait for short distances without wc follow with target destination provided (wc<>therapy mat), remains with motor delay RUE/ RLE.     Strengths: Able to follow instructions, Alert and oriented, Effective communication skills, Independent prior level of function, Motivated for self care and independence, Pleasant and cooperative, Willingly participates in therapeutic activities  Barriers: Decreased endurance, Fatigue, Hemiparesis, Impaired activity tolerance, Impaired balance, Impaired functional  cognition, Limited mobility (hx of TBI and CVA)    Plan    Gait short distances in // bars progressing to FWW, squat pivot progressing to stand pivot FWW txs, stairs/curb when appropriate, LE strengthening and endurance     Passport items to be completed:  Get in/out of bed safely, in/out of a vehicle, safely use mobility device, walk or wheel around home/community, navigate up and down stairs, show how to get up/down from the ground, ensure home is accessible, demonstrate HEP, complete caregiver training      Physical Therapy Problems (Active)       Problem: Mobility       Dates: Start: 02/28/23         Goal: STG-Within one week, patient will ambulate household distance 25 ft x2 with FWW and CGA.       Dates: Start: 02/28/23   Expected End: 03/11/23         Goal Note filed on 03/02/23 1342 by Elke Hernandez, PT       20 ft x2 with min A and wc follow              Goal: STG-Within one week, patient will ambulate up/down a curb with FWW and min A.       Dates: Start: 02/28/23   Expected End: 03/11/23         Goal Note filed on 03/02/23 1342 by Elke Hernandez, PT       Not yet assessed d/t safety concerns              Goal: STG-Within one week, patient will ambulate up/down flight of stairs with min A using BHR and RB prn.       Dates: Start: 02/28/23   Expected End: 03/11/23         Goal Note filed on 03/02/23 1342 by Elke Hernandez, PT       Not yet assessed d/t safety concerns                 Problem: Mobility Transfers       Dates: Start: 02/28/23         Goal: STG-Within one week, patient will transfer bed to chair with FWW and SBA.       Dates: Start: 02/28/23   Expected End: 03/11/23         Goal Note filed on 03/02/23 1342 by Elke Hernandez, PT       CGA                 Problem: PT-Long Term Goals       Dates: Start: 02/28/23         Goal: LTG-By discharge, patient will ambulate household distances at least 150 ft with FWW vs LRAD and SPV.       Dates: Start: 02/28/23   Expected End: 03/11/23            Goal: LTG-By  discharge, patient will transfer one surface to another with FWW vs LRAD and SPV.       Dates: Start: 02/28/23   Expected End: 03/11/23            Goal: LTG-By discharge, patient will ambulate up/down flight of stairs with BHR and SPV requiring no RB.       Dates: Start: 02/28/23   Expected End: 03/11/23            Goal: LTG-By discharge, patient will transfer in/out of a car with FWW vs LRAD and set up A.       Dates: Start: 02/28/23   Expected End: 03/11/23

## 2023-03-02 NOTE — CARE PLAN
Problem: Recreation Therapy  Goal: STG-Within one week, patient will identify one new leisure interest.   Outcome: Met  Goal: LTG-By discharge, patient will identify two new leisure interests.   Outcome: Progressing

## 2023-03-02 NOTE — CARE PLAN
"Seizure precautions observed, side rails padded. Aspiration precautions observed.   Problem: Diabetes Management  Goal: Patient's ability to maintain appropriate glucose levels will be maintained or improve  Outcome: Progressing  Note: Finger stick monitored- HS- 136 no coverage needed, snacks provided. Cont monitored.     Problem: Fall Risk - Rehab  Goal: Patient will remain free from falls  Outcome: Progressing  Note: Mariluz Ball Fall risk Assessment Score: 15    High fall risk Interventions   - Alarming seatbelt  - Wander guard  - Bed and strip alarm   - Yellow sign by the door   - Yellow wrist band \"Fall risk\"  - Room near to the nurse station  - Do not leave patient unattended in the bathroom  - Fall risk education provided       The patient is stable     Progress made toward(s) clinical / shift goals:  Pt free from fall and injury.    "

## 2023-03-02 NOTE — THERAPY
Speech Language Pathology  Daily Treatment     Patient Name: Norm Prabhakar  Age:  42 y.o., Sex:  male  Medical Record #: [id number]  Today's Date: 3/2/2023     Precautions  Precautions: Fall Risk, Other (See Comments)  Comments: seizure, R hemiparesis; possible conversion disorder    Subjective    Patient pleasant and agreeable to therapy.     Objective       03/02/23 1001   Treatment Charges   SLP Cognitive Skill Development First 15 Minutes 1   SLP Cognitive Skill Development Additional 15 Minutes 3   SLP Total Time Spent   SLP Individual Total Time Spent (Mins) 60   Cognition   Moderate Attention Supervision (5)   Functional Memory Activities Moderate (3)         Assessment    Introduced memory log and provided education on its use to record new training that he needs to recall for improved safety, balance and function.  Educated patient on use of RWAVS memory strategies, recalled 3/5 after 2 min delay.  Patient targeted attention in the context of following 1-2 step written directions with 100% acc, and 4 step sequencing with 100% acc.   Mod A needed for attention with in functional math word problems     Strengths: Able to follow instructions, Alert and oriented, Supportive family, Willingly participates in therapeutic activities  Barriers: Impaired carryover of learning, Impaired insight/denial of deficits, Impaired functional cognition    Plan    Target attention, recall, recommend reduce to 30 min.    Passport items to be completed:  Express basic needs, understand food/liquid recommendations, consistently follow swallow precautions, manage finances, manage medications, arrive to therapy appointments on time, complete daily memory log entries, solve problems related to safety situations, review education related to hospitalization, complete caregiver training     Speech Therapy Problems (Active)       Problem: Memory STGs       Dates: Start: 02/28/23         Goal: STG-Within one week, patient will recall new  training, daily events with 75% acc with min A.       Dates: Start: 02/28/23   Expected End: 03/11/23         Goal Note filed on 03/02/23 1314 by Lulu Leija MS,CCC-SLP       Min to mod cues for recall of new training, with low attention to new training.              Goal: STG-Within one week, patient will       Dates: Start: 02/28/23   Expected End: 03/11/23               Problem: Speech/Swallowing LTGs       Dates: Start: 02/28/23         Goal: LTG-By discharge, patient will solve basic problems and recall safety training with 80% acc with min A.       Dates: Start: 02/28/23   Expected End: 03/11/23

## 2023-03-02 NOTE — CARE PLAN
Problem: Bathing  Goal: STG-Within one week, patient will bathe with SBA using grab bars as needed.   Outcome: Not Met  Note: CGA     Problem: Dressing  Goal: STG-Within one week, patient will dress LB with mod A.  Outcome: Met  Note: CGA     Problem: Functional Transfers  Goal: STG-Within one week, patient will transfer to toilet with min A.  Outcome: Met  Note: CGA

## 2023-03-02 NOTE — PROGRESS NOTES
Physical Medicine & Rehabilitation Progress Note    Encounter Date: 3/2/2023    Chief Complaint: Decreased mobility, right sided weakness    Interval Events (Subjective):  Patient sitting up in room. He reports therapy is going well. Reviewed AM labs and elevated BUN. Discussed about increased PO intake. He also has mild anemia. Discussed about having IDT later today to discuss discharge planning. He is hoping to get stronger so he can get home to his dog.     _____________________________________  Interdisciplinary Team Conference   Most recent IDT on 3/2/2023    IKaran M.D./Ph.D., was present and led the interdisciplinary team conference on 3/2/2023.  I led the IDT conference and agree with the IDT conference documentation and plan of care as noted below.     Nursing:  Diet Current Diet Order   Procedures    Diet Order Diet: Consistent CHO (Diabetic)       Eating ADL Independent      % of Last Meal  Oral Nutrition: Breakfast, Between % Consumed   Sleep    Bowel Last BM: 03/01/23   Bladder Continent   Barriers to Discharge Home:  Poor diet for DM    Physical Therapy:  Bed Mobility CGA   Transfers Contact Guard Assist  Adaptive equipment, Increased time, Set-up of equipment   Mobility Minimal Assist (wc in tow by this PT)   Stairs    Barriers to Discharge Home:  Limited walking  Poor endurance  Right sided weakness    HH vs OP    Occupational Therapy:  Grooming Supervision, Seated   Bathing Contact Guard Assist   UB Dressing Supervision   LB Dressing Contact Guard Assist   Toileting Total Assist (2 people for safety)   Shower & Transfer CGA   Barriers to Discharge Home:  Endurance  Balance   R weakness    OP OT    Speech-Language Pathology:  Comprehension:  Supervision  Comprehension Description:  Glasses, Verbal cues  Expression:  Modified Independent  Expression Description:  Other (comment) (extra time)  Social Interaction:  Modified Independent  Social Interaction Description:   "Medication  Problem Solving:  Minimal Assist  Problem Solving Description:  Increased time, Seat belt, Supervision, Bed/chair alarm, Therapy schedule, Verbal cueing  Memory:  Moderate Assist  Memory Description:  Increased time, Seat belt, Supervision, Bed/chair alarm, Therapy schedule, Verbal cueing  Barriers to Discharge Home:  Mild memory deficits  Mom manages medications    Case Management:  Continues to work on disposition and DME needs.      Discharge Date/Disposition:  3/11/23  _____________________________________    Objective:  VITAL SIGNS: /56   Pulse 70   Temp 36.6 °C (97.9 °F) (Oral)   Resp 16   Ht 1.981 m (6' 6\")   Wt 109 kg (240 lb)   SpO2 97%   BMI 27.73 kg/m²   Gen: NAD  Psych: Mood and affect appropriate  CV: RRR, 0 edema  Resp: CTAB, no upper airway sounds  Abd: NTND  Neuro: AOx4, able to put on right sock with right hand assistance  Unchanged from 3/1/23    Laboratory Values:  Recent Results (from the past 72 hour(s))   POCT glucose device results    Collection Time: 02/27/23  5:13 PM   Result Value Ref Range    POC Glucose, Blood 159 (H) 65 - 99 mg/dL   POCT glucose device results    Collection Time: 02/27/23  9:06 PM   Result Value Ref Range    POC Glucose, Blood 150 (H) 65 - 99 mg/dL   CBC with Differential    Collection Time: 02/28/23  5:12 AM   Result Value Ref Range    WBC 8.8 4.8 - 10.8 K/uL    RBC 4.63 (L) 4.70 - 6.10 M/uL    Hemoglobin 13.9 (L) 14.0 - 18.0 g/dL    Hematocrit 42.1 42.0 - 52.0 %    MCV 90.9 81.4 - 97.8 fL    MCH 30.0 27.0 - 33.0 pg    MCHC 33.0 (L) 33.7 - 35.3 g/dL    RDW 40.1 35.9 - 50.0 fL    Platelet Count 278 164 - 446 K/uL    MPV 11.0 9.0 - 12.9 fL    Neutrophils-Polys 38.40 (L) 44.00 - 72.00 %    Lymphocytes 48.70 (H) 22.00 - 41.00 %    Monocytes 8.60 0.00 - 13.40 %    Eosinophils 2.30 0.00 - 6.90 %    Basophils 1.00 0.00 - 1.80 %    Immature Granulocytes 1.00 (H) 0.00 - 0.90 %    Nucleated RBC 0.00 /100 WBC    Neutrophils (Absolute) 3.39 1.82 - 7.42 K/uL "    Lymphs (Absolute) 4.30 1.00 - 4.80 K/uL    Monos (Absolute) 0.76 0.00 - 0.85 K/uL    Eos (Absolute) 0.20 0.00 - 0.51 K/uL    Baso (Absolute) 0.09 0.00 - 0.12 K/uL    Immature Granulocytes (abs) 0.09 0.00 - 0.11 K/uL    NRBC (Absolute) 0.00 K/uL   Comp Metabolic Panel (CMP)    Collection Time: 02/28/23  5:12 AM   Result Value Ref Range    Sodium 139 135 - 145 mmol/L    Potassium 4.0 3.6 - 5.5 mmol/L    Chloride 103 96 - 112 mmol/L    Co2 26 20 - 33 mmol/L    Anion Gap 10.0 7.0 - 16.0    Glucose 141 (H) 65 - 99 mg/dL    Bun 19 8 - 22 mg/dL    Creatinine 0.76 0.50 - 1.40 mg/dL    Calcium 9.1 8.5 - 10.5 mg/dL    AST(SGOT) 9 (L) 12 - 45 U/L    ALT(SGPT) 11 2 - 50 U/L    Alkaline Phosphatase 56 30 - 99 U/L    Total Bilirubin 0.2 0.1 - 1.5 mg/dL    Albumin 3.8 3.2 - 4.9 g/dL    Total Protein 5.8 (L) 6.0 - 8.2 g/dL    Globulin 2.0 1.9 - 3.5 g/dL    A-G Ratio 1.9 g/dL   HEMOGLOBIN A1C    Collection Time: 02/28/23  5:12 AM   Result Value Ref Range    Glycohemoglobin 7.8 (H) 4.0 - 5.6 %    Est Avg Glucose 177 mg/dL   TSH with Reflex to FT4    Collection Time: 02/28/23  5:12 AM   Result Value Ref Range    TSH 7.550 (H) 0.380 - 5.330 uIU/mL   Vitamin D, 25-hydroxy (blood)    Collection Time: 02/28/23  5:12 AM   Result Value Ref Range    25-Hydroxy   Vitamin D 25 35 30 - 100 ng/mL   CORRECTED CALCIUM    Collection Time: 02/28/23  5:12 AM   Result Value Ref Range    Correct Calcium 9.3 8.5 - 10.5 mg/dL   ESTIMATED GFR    Collection Time: 02/28/23  5:12 AM   Result Value Ref Range    GFR (CKD-EPI) 116 >60 mL/min/1.73 m 2   FREE THYROXINE    Collection Time: 02/28/23  5:12 AM   Result Value Ref Range    Free T-4 1.16 0.93 - 1.70 ng/dL   POCT glucose device results    Collection Time: 02/28/23  8:14 AM   Result Value Ref Range    POC Glucose, Blood 154 (H) 65 - 99 mg/dL   POCT glucose device results    Collection Time: 02/28/23 11:14 AM   Result Value Ref Range    POC Glucose, Blood 125 (H) 65 - 99 mg/dL   POCT glucose device  results    Collection Time: 02/28/23  5:05 PM   Result Value Ref Range    POC Glucose, Blood 103 (H) 65 - 99 mg/dL   POCT glucose device results    Collection Time: 02/28/23  8:56 PM   Result Value Ref Range    POC Glucose, Blood 90 65 - 99 mg/dL   POCT glucose device results    Collection Time: 02/28/23  9:54 PM   Result Value Ref Range    POC Glucose, Blood 96 65 - 99 mg/dL   POCT glucose device results    Collection Time: 02/28/23 10:18 PM   Result Value Ref Range    POC Glucose, Blood 118 (H) 65 - 99 mg/dL   POCT glucose device results    Collection Time: 03/01/23  7:05 AM   Result Value Ref Range    POC Glucose, Blood 109 (H) 65 - 99 mg/dL   POCT glucose device results    Collection Time: 03/01/23 11:32 AM   Result Value Ref Range    POC Glucose, Blood 94 65 - 99 mg/dL   POCT glucose device results    Collection Time: 03/01/23  4:51 PM   Result Value Ref Range    POC Glucose, Blood 105 (H) 65 - 99 mg/dL   POCT glucose device results    Collection Time: 03/01/23  9:01 PM   Result Value Ref Range    POC Glucose, Blood 136 (H) 65 - 99 mg/dL   CBC WITH DIFFERENTIAL    Collection Time: 03/02/23  5:29 AM   Result Value Ref Range    WBC 7.7 4.8 - 10.8 K/uL    RBC 4.35 (L) 4.70 - 6.10 M/uL    Hemoglobin 13.1 (L) 14.0 - 18.0 g/dL    Hematocrit 40.1 (L) 42.0 - 52.0 %    MCV 92.2 81.4 - 97.8 fL    MCH 30.1 27.0 - 33.0 pg    MCHC 32.7 (L) 33.7 - 35.3 g/dL    RDW 41.6 35.9 - 50.0 fL    Platelet Count 279 164 - 446 K/uL    MPV 11.1 9.0 - 12.9 fL    Neutrophils-Polys 41.10 (L) 44.00 - 72.00 %    Lymphocytes 45.40 (H) 22.00 - 41.00 %    Monocytes 10.00 0.00 - 13.40 %    Eosinophils 1.80 0.00 - 6.90 %    Basophils 0.80 0.00 - 1.80 %    Immature Granulocytes 0.90 0.00 - 0.90 %    Nucleated RBC 0.00 /100 WBC    Neutrophils (Absolute) 3.17 1.82 - 7.42 K/uL    Lymphs (Absolute) 3.50 1.00 - 4.80 K/uL    Monos (Absolute) 0.77 0.00 - 0.85 K/uL    Eos (Absolute) 0.14 0.00 - 0.51 K/uL    Baso (Absolute) 0.06 0.00 - 0.12 K/uL    Immature  Granulocytes (abs) 0.07 0.00 - 0.11 K/uL    NRBC (Absolute) 0.00 K/uL   Basic Metabolic Panel    Collection Time: 03/02/23  5:29 AM   Result Value Ref Range    Sodium 140 135 - 145 mmol/L    Potassium 3.9 3.6 - 5.5 mmol/L    Chloride 105 96 - 112 mmol/L    Co2 23 20 - 33 mmol/L    Glucose 109 (H) 65 - 99 mg/dL    Bun 28 (H) 8 - 22 mg/dL    Creatinine 0.95 0.50 - 1.40 mg/dL    Calcium 9.2 8.5 - 10.5 mg/dL    Anion Gap 12.0 7.0 - 16.0   MAGNESIUM    Collection Time: 03/02/23  5:29 AM   Result Value Ref Range    Magnesium 1.8 1.5 - 2.5 mg/dL   ESTIMATED GFR    Collection Time: 03/02/23  5:29 AM   Result Value Ref Range    GFR (CKD-EPI) 103 >60 mL/min/1.73 m 2   POCT glucose device results    Collection Time: 03/02/23  7:33 AM   Result Value Ref Range    POC Glucose, Blood 128 (H) 65 - 99 mg/dL   POCT glucose device results    Collection Time: 03/02/23 11:15 AM   Result Value Ref Range    POC Glucose, Blood 113 (H) 65 - 99 mg/dL       Medications:  Scheduled Medications   Medication Dose Frequency    metformin  500 mg BID WITH MEALS    dapagliflozin propanediol  5 mg DAILY    SITagliptin  50 mg DAILY    lisinopril  5 mg DAILY    insulin regular  2-12 Units 4X/DAY ACHS    insulin GLARGINE  32 Units BID    senna-docusate  2 Tablet BID    omeprazole  20 mg DAILY    atorvastatin  20 mg Q EVENING    levothyroxine  200 mcg AM ES    levothyroxine  25 mcg AM ES    vitamin D3  4,000 Units Q EVENING    divalproex  1,500 mg QHS    fenofibrate micronized  134 mg DAILY    lurasidone  80 mg PM MEAL    montelukast  10 mg Q EVENING    prazosin  5 mg Q EVENING    sertraline  150 mg QHS    levETIRAcetam  1,000 mg Q12HRS    divalproex  500 mg DAILY    loratadine  10 mg DAILY    topiramate  50 mg Q12HRS    traZODone  100 mg QHS    dorzolamide-timolol  1 Drop BID    budesonide-formoterol  2 Puff BID (RT)    enoxaparin (LOVENOX) injection  40 mg DAILY     PRN medications: insulin regular **AND** POC blood glucose manual result **AND** NOTIFY  MD and PharmD **AND** Administer 20 grams of glucose (approximately 8 ounces of fruit juice) every 15 minutes PRN FSBG less than 70 mg/dL **AND** dextrose bolus, Respiratory Therapy Consult, hydrALAZINE, acetaminophen, senna-docusate **AND** polyethylene glycol/lytes **AND** magnesium hydroxide **AND** bisacodyl, mag hydrox-al hydrox-simeth, ondansetron **OR** ondansetron, traZODone, sodium chloride, traMADol, midazolam, albuterol    Diet:  Current Diet Order   Procedures    Diet Order Diet: Consistent CHO (Diabetic)       Assessment:  Active Hospital Problems    Diagnosis     *Seizure disorder (HCC)     Acute right-sided weakness     Primary hypertension     Mixed hyperlipidemia     Type 2 diabetes mellitus without complication, without long-term current use of insulin (HCC)     PTSD (post-traumatic stress disorder)     Neck mass     Postoperative hypothyroidism     Anxiety and depression        Medical Decision Making and Plan:  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  -PT and OT for mobility and ADLs. Per guidelines, 15 hours per week between PT, OT and SLP.  -Follow-up APAP/Tramadol  -Continue Keppra and Depakote     HTN - Patient on Lisinopril 10 mg     DM2 with hyperglycemia - Patient on Lantus and SSI as well as metformin, Januvia. Blood sugars elevated, consult hospitalist. A1c 7.8. Ongoing hyperglycemia. Continue Lantus and SSI     HLD - Patient on Atorvastatin 20 mg QHS and Lofibra     Glaucoma - Patient on Cosopt      BPD/Mood disorder - Patient on Depakote 500/1500, Prazosin 5 mg daily, Sertraline 150 mg, Topamax 50 mg BID, Latuda 80 mg QHS and Trazodone 100 mg   -Consult Neuropsychology. Has history of suicide attempts per chart reviewed. Discussed case with Dr. Cervantes     Anemia - 13.9 on admission, repeat 3/2 - 13.1    Azotemia - BUN 28 on 3/2/23. Encourage PO fluids  for now. If no improvement will need IVF    Hypothyroidism - Patient on 225 mcg of Levothyroxine. TSH elevated but T4 normal.      Asthma - Patient on Singulair, Claritin, and Symbicort     Pain - APAP/Tramadol PRN     Skin - Patient at risk for skin breakdown due to debility in areas including sacrum, achilles, elbows and head in addition to other sites. Nursing to assess skin daily.     GI Ppx - Patient on Prilosec for GERD prophylaxis. Patient on Senna-docusate for constipation prophylaxis.      DVT Ppx - Patient Lovenox on transfer.    ____________________________________    T. Benjamin Smith MD./PhD.  [facility] - Physical Medicine & Rehabilitation   [facility]   ____________________________________    Total time:  50 minutes. Time spent included pre-rounding review of vitals and tests, unit/floor time, face-to-face time with the patient including physical examination, care coordination, counseling of patient and/or family, ordering medications/procedures/tests, discussion with CM, PT, OT, SLP and/or other healthcare providers, and documentation in the electronic medical record. Topics discussed included discharge planning, azotemia, encourage fluids, anemia, and recovery after seizure.

## 2023-03-02 NOTE — CARE PLAN
Problem: Mobility  Goal: STG-Within one week, patient will ambulate up/down a curb with FWW and min A.  Outcome: Not Met  Note: Not yet assessed d/t safety concerns  Goal: STG-Within one week, patient will ambulate up/down flight of stairs with min A using BHR and RB prn.  Outcome: Not Met  Note: Not yet assessed d/t safety concerns     Problem: Mobility  Goal: STG-Within one week, patient will ambulate household distance 25 ft x2 with FWW and CGA.  Outcome: Progressing  Note: 20 ft x2 with min A and wc follow     Problem: Mobility Transfers  Goal: STG-Within one week, patient will transfer bed to chair with FWW and SBA.  Outcome: Progressing  Note: CGA

## 2023-03-02 NOTE — FLOWSHEET NOTE
03/02/23 0805   Events/Summary/Plan   Events/Summary/Plan SpO2 check, MDI   Vital Signs   Pulse 70   Respiration 16   Pulse Oximetry 97 %   $ Pulse Oximetry (Spot Check) Yes   Respiratory Assessment   Respiratory Pattern Within Normal Limits   Level of Consciousness Alert   Chest Exam   Work Of Breathing / Effort Within Normal Limits   Oxygen   O2 Delivery Device None - Room Air

## 2023-03-02 NOTE — THERAPY
"Occupational Therapy  Daily Treatment     Patient Name: Norm Prabhakar  Age:  42 y.o., Sex:  male  Medical Record #: [id number]  Today's Date: 3/2/2023     Precautions  Precautions: Fall Risk, Other (See Comments)  Comments: seizure, R hemiparesis; possible conversion disorder         Subjective    \"I plan on moving down to [place] at some point to be with my gf and her daughter and helping her take care of her mother.\"      Objective       03/02/23 1301   OT Charge Group   OT Neuromuscular Re-education / Balance (Units) 2   OT Therapy Activity (Units) 2   OT Total Time Spent   OT Individual Total Time Spent (Mins) 60   Pain   Intervention Distraction;Emotional Support;Heat Applied;Rest;Repositioned   Pain 0 - 10 Group   Location Back   Location Orientation Lower   Pain Rating Scale (NPRS) 5   Description Aching   Comfort Goal Comfort with Movement;Perform Activity;Sleep Comfortably   Therapist Pain Assessment Prior to Activity   Cognition    Level of Consciousness Alert   Supine Upper Body Exercises   Chest Press 2 sets of 10;Bilateral;Other Resistance (See Comments)  (dowel london used for AAROM)   Front Arm Raise 2 sets of 10;Bilateral;Other Resistance (See Comments)  (dowel london used for AAROM)   Neuro-Muscular Treatments   Neuro-Muscular Treatments Verbal Cuing   Comments see note for details   Bed Mobility    Supine to Sit Supervised   Sit to Supine Supervised   Interdisciplinary Plan of Care Collaboration   Patient Position at End of Therapy In Bed;Call Light within Reach;Tray Table within Reach;Phone within Reach     Educated pt on self-ROM exercises to perform in bed to increased AROM in RUE: shoulder flexion/extension, elbow flex/ext, wrist flex/ext, finger flex/ext       Assessment    Pt tolerated session fair. Pt c/o back pain at start of session- heat packs issued to pt to assist with pain mgmt- pt reported decreased pain with heat. Pt having increased difficulty with shoulder flexion of RUE- issued pt dowel london " for BREEOM to allow LUE to assist RUE in shoulder flexion. Pt required encouragement throughout to bring arm higher, long rest breaks taken between sets. Pt required frequent redirection to task. Pt able to achieve 90 degrees during chest press but only ~75 degrees during front arm raises. Increased time taken to go over d/c planning- pt reported living in single level home, no ANH with his mom and 4 puppies. Pt reported that he plans on getting a job at [employer] upon d/c.     Strengths: Able to follow instructions, Effective communication skills, Motivated for self care and independence, Manages pain appropriately, Pleasant and cooperative, Supportive family, Willingly participates in therapeutic activities, Alert and oriented, Independent prior level of function  Barriers: Decreased endurance, Hemiparesis, Impaired activity tolerance, Impaired balance, Limited mobility    Plan    ADLs, IADLs, transfers, functional mobility, R UE AROM/strengthening, standing tolerance/balance    Occupational Therapy Goals (Active)       Problem: Bathing       Dates: Start: 02/28/23         Goal: STG-Within one week, patient will bathe with SBA using grab bars as needed.        Dates: Start: 02/28/23   Expected End: 03/11/23         Goal Note filed on 03/02/23 1346 by Juan Preston, OT/L       CGA                 Problem: Dressing       Dates: Start: 03/02/23         Goal: STG-Within one week, patient will dress LB with setup and supervision       Dates: Start: 03/02/23   Expected End: 03/11/23               Problem: Functional Transfers       Dates: Start: 03/02/23         Goal: STG-Within one week, patient will transfer to toilet with setup and supervision       Dates: Start: 03/02/23   Expected End: 03/11/23               Problem: OT Long Term Goals       Dates: Start: 02/28/23         Goal: LTG-By discharge, patient will complete basic self care tasks with supervision.       Dates: Start: 02/28/23   Expected End: 03/11/23             Goal: LTG-By discharge, patient will perform bathroom transfers with supervision.       Dates: Start: 02/28/23   Expected End: 03/11/23            Goal: LTG-By discharge, patient will complete basic home management with supervision.        Dates: Start: 02/28/23   Expected End: 03/11/23               Problem: Toileting       Dates: Start: 03/02/23         Goal: STG-Within one week, patient will complete toileting tasks with setup and supervision       Dates: Start: 03/02/23   Expected End: 03/11/23

## 2023-03-02 NOTE — CARE PLAN
Problem: Problem Solving STGs  Goal: STG-Within one week, patient will perform selective attention tasks with 75% acc with min A.  Outcome: Met  Note: Able to perform simple selective attention tasks with % acc.     Problem: Memory STGs  Goal: STG-Within one week, patient will recall new training, daily events with 75% acc with min A.  Outcome: Not Met  Note: Min to mod cues for recall of new training, with low attention to new training.

## 2023-03-03 ENCOUNTER — APPOINTMENT (OUTPATIENT)
Dept: OCCUPATIONAL THERAPY | Facility: REHABILITATION | Age: 43
DRG: 101 | End: 2023-03-03
Attending: PHYSICAL MEDICINE & REHABILITATION
Payer: MEDICAID

## 2023-03-03 ENCOUNTER — APPOINTMENT (OUTPATIENT)
Dept: SPEECH THERAPY | Facility: REHABILITATION | Age: 43
DRG: 101 | End: 2023-03-03
Attending: PHYSICAL MEDICINE & REHABILITATION
Payer: MEDICAID

## 2023-03-03 ENCOUNTER — APPOINTMENT (OUTPATIENT)
Dept: PHYSICAL THERAPY | Facility: REHABILITATION | Age: 43
DRG: 101 | End: 2023-03-03
Attending: PHYSICAL MEDICINE & REHABILITATION
Payer: MEDICAID

## 2023-03-03 LAB
GLUCOSE BLD STRIP.AUTO-MCNC: 114 MG/DL (ref 65–99)
GLUCOSE BLD STRIP.AUTO-MCNC: 77 MG/DL (ref 65–99)
GLUCOSE BLD STRIP.AUTO-MCNC: 84 MG/DL (ref 65–99)
GLUCOSE BLD STRIP.AUTO-MCNC: 99 MG/DL (ref 65–99)

## 2023-03-03 PROCEDURE — 97110 THERAPEUTIC EXERCISES: CPT

## 2023-03-03 PROCEDURE — 97530 THERAPEUTIC ACTIVITIES: CPT

## 2023-03-03 PROCEDURE — 97129 THER IVNTJ 1ST 15 MIN: CPT

## 2023-03-03 PROCEDURE — A9270 NON-COVERED ITEM OR SERVICE: HCPCS | Performed by: HOSPITALIST

## 2023-03-03 PROCEDURE — 99232 SBSQ HOSP IP/OBS MODERATE 35: CPT | Performed by: PHYSICAL MEDICINE & REHABILITATION

## 2023-03-03 PROCEDURE — 700111 HCHG RX REV CODE 636 W/ 250 OVERRIDE (IP): Performed by: PHYSICAL MEDICINE & REHABILITATION

## 2023-03-03 PROCEDURE — 99232 SBSQ HOSP IP/OBS MODERATE 35: CPT | Performed by: HOSPITALIST

## 2023-03-03 PROCEDURE — 97130 THER IVNTJ EA ADDL 15 MIN: CPT

## 2023-03-03 PROCEDURE — A9270 NON-COVERED ITEM OR SERVICE: HCPCS | Performed by: PHYSICAL MEDICINE & REHABILITATION

## 2023-03-03 PROCEDURE — 90834 PSYTX W PT 45 MINUTES: CPT | Mod: MISDOCU | Performed by: PSYCHOLOGIST

## 2023-03-03 PROCEDURE — 700102 HCHG RX REV CODE 250 W/ 637 OVERRIDE(OP): Performed by: PHYSICAL MEDICINE & REHABILITATION

## 2023-03-03 PROCEDURE — 94760 N-INVAS EAR/PLS OXIMETRY 1: CPT

## 2023-03-03 PROCEDURE — 700102 HCHG RX REV CODE 250 W/ 637 OVERRIDE(OP): Performed by: HOSPITALIST

## 2023-03-03 PROCEDURE — 770010 HCHG ROOM/CARE - REHAB SEMI PRIVAT*

## 2023-03-03 PROCEDURE — 82962 GLUCOSE BLOOD TEST: CPT

## 2023-03-03 PROCEDURE — 94640 AIRWAY INHALATION TREATMENT: CPT

## 2023-03-03 PROCEDURE — 97116 GAIT TRAINING THERAPY: CPT

## 2023-03-03 RX ORDER — LURASIDONE HYDROCHLORIDE 20 MG/1
20 TABLET, FILM COATED ORAL
Status: DISCONTINUED | OUTPATIENT
Start: 2023-03-03 | End: 2023-03-11 | Stop reason: HOSPADM

## 2023-03-03 RX ORDER — LISINOPRIL 5 MG/1
2.5 TABLET ORAL DAILY
Status: DISCONTINUED | OUTPATIENT
Start: 2023-03-04 | End: 2023-03-05

## 2023-03-03 RX ADMIN — DORZOLAMIDE HYDROCHLORIDE AND TIMOLOL MALEATE 1 DROP: 20; 5 SOLUTION/ DROPS OPHTHALMIC at 08:06

## 2023-03-03 RX ADMIN — DAPAGLIFLOZIN 5 MG: 10 TABLET, FILM COATED ORAL at 08:05

## 2023-03-03 RX ADMIN — ENOXAPARIN SODIUM 40 MG: 40 INJECTION SUBCUTANEOUS at 08:06

## 2023-03-03 RX ADMIN — ATORVASTATIN CALCIUM 20 MG: 10 TABLET, FILM COATED ORAL at 21:41

## 2023-03-03 RX ADMIN — DIVALPROEX SODIUM 500 MG: 500 TABLET, DELAYED RELEASE ORAL at 08:02

## 2023-03-03 RX ADMIN — SITAGLIPTIN 50 MG: 50 TABLET, FILM COATED ORAL at 08:02

## 2023-03-03 RX ADMIN — DORZOLAMIDE HYDROCHLORIDE AND TIMOLOL MALEATE 1 DROP: 20; 5 SOLUTION/ DROPS OPHTHALMIC at 21:37

## 2023-03-03 RX ADMIN — FENOFIBRATE 134 MG: 67 CAPSULE ORAL at 08:02

## 2023-03-03 RX ADMIN — LEVOTHYROXINE SODIUM 200 MCG: 0.12 TABLET ORAL at 05:36

## 2023-03-03 RX ADMIN — BUDESONIDE AND FORMOTEROL FUMARATE DIHYDRATE 2 PUFF: 160; 4.5 AEROSOL RESPIRATORY (INHALATION) at 07:59

## 2023-03-03 RX ADMIN — LORATADINE 10 MG: 10 TABLET ORAL at 08:02

## 2023-03-03 RX ADMIN — Medication 4000 UNITS: at 21:41

## 2023-03-03 RX ADMIN — DIVALPROEX SODIUM 1500 MG: 500 TABLET, DELAYED RELEASE ORAL at 21:41

## 2023-03-03 RX ADMIN — BUDESONIDE AND FORMOTEROL FUMARATE DIHYDRATE 2 PUFF: 160; 4.5 AEROSOL RESPIRATORY (INHALATION) at 21:55

## 2023-03-03 RX ADMIN — LEVETIRACETAM 1000 MG: 500 TABLET, FILM COATED ORAL at 08:02

## 2023-03-03 RX ADMIN — LEVETIRACETAM 1000 MG: 500 TABLET, FILM COATED ORAL at 21:41

## 2023-03-03 RX ADMIN — MONTELUKAST 10 MG: 10 TABLET, FILM COATED ORAL at 21:41

## 2023-03-03 RX ADMIN — LURASIDONE HYDROCHLORIDE 20 MG: 20 TABLET, FILM COATED ORAL at 17:25

## 2023-03-03 RX ADMIN — TOPIRAMATE 50 MG: 25 TABLET, FILM COATED ORAL at 08:02

## 2023-03-03 RX ADMIN — METFORMIN HYDROCHLORIDE 500 MG: 500 TABLET ORAL at 08:02

## 2023-03-03 RX ADMIN — LEVOTHYROXINE SODIUM 25 MCG: 0.03 TABLET ORAL at 05:36

## 2023-03-03 RX ADMIN — METFORMIN HYDROCHLORIDE 500 MG: 500 TABLET ORAL at 17:25

## 2023-03-03 RX ADMIN — TRAZODONE HYDROCHLORIDE 100 MG: 100 TABLET ORAL at 21:41

## 2023-03-03 RX ADMIN — TOPIRAMATE 50 MG: 25 TABLET, FILM COATED ORAL at 21:41

## 2023-03-03 RX ADMIN — PRAZOSIN HYDROCHLORIDE 5 MG: 5 CAPSULE ORAL at 21:55

## 2023-03-03 RX ADMIN — OMEPRAZOLE 20 MG: 20 CAPSULE, DELAYED RELEASE ORAL at 08:02

## 2023-03-03 RX ADMIN — SERTRALINE HYDROCHLORIDE 150 MG: 50 TABLET, FILM COATED ORAL at 21:41

## 2023-03-03 ASSESSMENT — GAIT ASSESSMENTS
ASSISTIVE DEVICE: FRONT WHEEL WALKER
ASSISTIVE DEVICE: FRONT WHEEL WALKER
GAIT LEVEL OF ASSIST: CONTACT GUARD ASSIST
DISTANCE (FEET): 30
DEVIATION: BRADYKINETIC;SHUFFLED GAIT;DECREASED HEEL STRIKE;DECREASED TOE OFF
GAIT LEVEL OF ASSIST: CONTACT GUARD ASSIST
DEVIATION: BRADYKINETIC;SHUFFLED GAIT;DECREASED HEEL STRIKE;DECREASED TOE OFF
DISTANCE (FEET): 30

## 2023-03-03 ASSESSMENT — ACTIVITIES OF DAILY LIVING (ADL)
BED_CHAIR_WHEELCHAIR_TRANSFER_DESCRIPTION: INCREASED TIME;SET-UP OF EQUIPMENT;SQUAT PIVOT TRANSFER TO WHEELCHAIR
BED_CHAIR_WHEELCHAIR_TRANSFER_DESCRIPTION: ADAPTIVE EQUIPMENT;INCREASED TIME;SET-UP OF EQUIPMENT

## 2023-03-03 ASSESSMENT — PAIN DESCRIPTION - PAIN TYPE: TYPE: ACUTE PAIN

## 2023-03-03 NOTE — THERAPY
Occupational Therapy  Daily Treatment     Patient Name: Norm Prabhkaar  Age:  42 y.o., Sex:  male  Medical Record #: [id number]  Today's Date: 3/3/2023     Precautions  Precautions: Fall Risk  Comments: seizure, R hemiparesis; possible conversion disorder         Subjective    Patient was lying in bed on his phone. Patient was agreeable to OT session and wanted to work on his R arm.     Objective       03/03/23 0831   OT Charge Group   OT Therapy Activity (Units) 1   OT Therapeutic Exercise (Units) 3   OT Total Time Spent   OT Individual Total Time Spent (Mins) 60   Cosignature   Documentation Review Approved as originally documented and filed.   Precautions   Precautions Fall Risk;Other (See Comments)   Comments seizure, R hemiparesis; possible conversion disorder   Functional Level of Assist   Bed, Chair, Wheelchair Transfer Standby Assist   Bed Chair Wheelchair Transfer Description Increased time;Set-up of equipment;Squat pivot transfer to wheelchair   Bed Mobility    Supine to Sit Minimal Assist  (min A for R LE)   Sit to Supine Supervised   Interdisciplinary Plan of Care Collaboration   Patient Position at End of Therapy In Bed;Call Light within Reach;Tray Table within Reach;Phone within Reach     Patient in supine with air splint on R UE for UE exercises. Patient completed shoulder flexion, shoulder abduction/adduction, and serratus punches 2 sets of 10 for each.     Patient sat EOM to execute elbow flexion 2 sets of 10.     Assessment    Patient had difficulty with UE exercises due to mental fatigue and required long rest breaks. Patient was pleasant throughout session.    Strengths: Able to follow instructions, Effective communication skills, Motivated for self care and independence, Manages pain appropriately, Pleasant and cooperative, Supportive family, Willingly participates in therapeutic activities, Alert and oriented, Independent prior level of function  Barriers: Decreased endurance, Hemiparesis,  Impaired activity tolerance, Impaired balance, Limited mobility    Plan    ADLs, IADLs, transfers, functional mobility, R UE AROM/strengthening, standing tolerance/balance    Occupational Therapy Goals (Active)       Problem: Bathing       Dates: Start: 02/28/23         Goal: STG-Within one week, patient will bathe with SBA using grab bars as needed.        Dates: Start: 02/28/23   Expected End: 03/11/23         Goal Note filed on 03/02/23 1346 by Juan Preston, OT/L       CGA                 Problem: Dressing       Dates: Start: 03/02/23         Goal: STG-Within one week, patient will dress LB with setup and supervision       Dates: Start: 03/02/23   Expected End: 03/11/23               Problem: Functional Transfers       Dates: Start: 03/02/23         Goal: STG-Within one week, patient will transfer to toilet with setup and supervision       Dates: Start: 03/02/23   Expected End: 03/11/23               Problem: OT Long Term Goals       Dates: Start: 02/28/23         Goal: LTG-By discharge, patient will complete basic self care tasks with supervision.       Dates: Start: 02/28/23   Expected End: 03/11/23            Goal: LTG-By discharge, patient will perform bathroom transfers with supervision.       Dates: Start: 02/28/23   Expected End: 03/11/23            Goal: LTG-By discharge, patient will complete basic home management with supervision.        Dates: Start: 02/28/23   Expected End: 03/11/23               Problem: Toileting       Dates: Start: 03/02/23         Goal: STG-Within one week, patient will complete toileting tasks with setup and supervision       Dates: Start: 03/02/23   Expected End: 03/11/23

## 2023-03-03 NOTE — CARE PLAN
"  Problem: Knowledge Deficit - Standard  Goal: Patient and family/care givers will demonstrate understanding of plan of care, disease process/condition, diagnostic tests and medications  Outcome: Progressing     Problem: Discharge Barriers/Planning  Goal: Patient's continuum of care needs are met  Outcome: Progressing  Note: Mariluz Ball Fall risk Assessment Score: 12    Moderate fall risk Interventions  - Bed and strip alarm   - Yellow sign by the door   - Yellow wrist band \"Fall risk\"  - Room near to the nurse station  - Fall risk education provided     "

## 2023-03-03 NOTE — PROGRESS NOTES
"  Physical Medicine & Rehabilitation Progress Note    Encounter Date: 3/3/2023    Chief Complaint: Decreased mobility, right sided weakness    Interval Events (Subjective):  Patient sitting up in room. He reports he is tired. He just finished with therapy and he is going to take a nap before SLP. He denies pain. Family brought in medications and will adjust his Latuda dose.     _____________________________________  Interdisciplinary Team Conference   Most recent IDT on 3/2/2023    Discharge Date/Disposition:  3/11/23  _____________________________________    Objective:  VITAL SIGNS: BP 98/63   Pulse 60   Temp 36.4 °C (97.5 °F) (Oral)   Resp 16   Ht 1.981 m (6' 6\")   Wt 109 kg (240 lb)   SpO2 97%   BMI 27.73 kg/m²   Gen: NAD  Psych: Mood and affect appropriate  CV: RRR, 0 edema  Resp: CTAB, no upper airway sounds  Abd: NTND  Neuro: AOx3, following commands    Laboratory Values:  Recent Results (from the past 72 hour(s))   POCT glucose device results    Collection Time: 02/28/23  5:05 PM   Result Value Ref Range    POC Glucose, Blood 103 (H) 65 - 99 mg/dL   POCT glucose device results    Collection Time: 02/28/23  8:56 PM   Result Value Ref Range    POC Glucose, Blood 90 65 - 99 mg/dL   POCT glucose device results    Collection Time: 02/28/23  9:54 PM   Result Value Ref Range    POC Glucose, Blood 96 65 - 99 mg/dL   POCT glucose device results    Collection Time: 02/28/23 10:18 PM   Result Value Ref Range    POC Glucose, Blood 118 (H) 65 - 99 mg/dL   POCT glucose device results    Collection Time: 03/01/23  7:05 AM   Result Value Ref Range    POC Glucose, Blood 109 (H) 65 - 99 mg/dL   POCT glucose device results    Collection Time: 03/01/23 11:32 AM   Result Value Ref Range    POC Glucose, Blood 94 65 - 99 mg/dL   POCT glucose device results    Collection Time: 03/01/23  4:51 PM   Result Value Ref Range    POC Glucose, Blood 105 (H) 65 - 99 mg/dL   POCT glucose device results    Collection Time: 03/01/23  9:01 " PM   Result Value Ref Range    POC Glucose, Blood 136 (H) 65 - 99 mg/dL   CBC WITH DIFFERENTIAL    Collection Time: 03/02/23  5:29 AM   Result Value Ref Range    WBC 7.7 4.8 - 10.8 K/uL    RBC 4.35 (L) 4.70 - 6.10 M/uL    Hemoglobin 13.1 (L) 14.0 - 18.0 g/dL    Hematocrit 40.1 (L) 42.0 - 52.0 %    MCV 92.2 81.4 - 97.8 fL    MCH 30.1 27.0 - 33.0 pg    MCHC 32.7 (L) 33.7 - 35.3 g/dL    RDW 41.6 35.9 - 50.0 fL    Platelet Count 279 164 - 446 K/uL    MPV 11.1 9.0 - 12.9 fL    Neutrophils-Polys 41.10 (L) 44.00 - 72.00 %    Lymphocytes 45.40 (H) 22.00 - 41.00 %    Monocytes 10.00 0.00 - 13.40 %    Eosinophils 1.80 0.00 - 6.90 %    Basophils 0.80 0.00 - 1.80 %    Immature Granulocytes 0.90 0.00 - 0.90 %    Nucleated RBC 0.00 /100 WBC    Neutrophils (Absolute) 3.17 1.82 - 7.42 K/uL    Lymphs (Absolute) 3.50 1.00 - 4.80 K/uL    Monos (Absolute) 0.77 0.00 - 0.85 K/uL    Eos (Absolute) 0.14 0.00 - 0.51 K/uL    Baso (Absolute) 0.06 0.00 - 0.12 K/uL    Immature Granulocytes (abs) 0.07 0.00 - 0.11 K/uL    NRBC (Absolute) 0.00 K/uL   Basic Metabolic Panel    Collection Time: 03/02/23  5:29 AM   Result Value Ref Range    Sodium 140 135 - 145 mmol/L    Potassium 3.9 3.6 - 5.5 mmol/L    Chloride 105 96 - 112 mmol/L    Co2 23 20 - 33 mmol/L    Glucose 109 (H) 65 - 99 mg/dL    Bun 28 (H) 8 - 22 mg/dL    Creatinine 0.95 0.50 - 1.40 mg/dL    Calcium 9.2 8.5 - 10.5 mg/dL    Anion Gap 12.0 7.0 - 16.0   MAGNESIUM    Collection Time: 03/02/23  5:29 AM   Result Value Ref Range    Magnesium 1.8 1.5 - 2.5 mg/dL   ESTIMATED GFR    Collection Time: 03/02/23  5:29 AM   Result Value Ref Range    GFR (CKD-EPI) 103 >60 mL/min/1.73 m 2   POCT glucose device results    Collection Time: 03/02/23  7:33 AM   Result Value Ref Range    POC Glucose, Blood 128 (H) 65 - 99 mg/dL   POCT glucose device results    Collection Time: 03/02/23 11:15 AM   Result Value Ref Range    POC Glucose, Blood 113 (H) 65 - 99 mg/dL   POCT glucose device results    Collection  Time: 03/02/23  5:18 PM   Result Value Ref Range    POC Glucose, Blood 73 65 - 99 mg/dL   POCT glucose device results    Collection Time: 03/02/23  9:44 PM   Result Value Ref Range    POC Glucose, Blood 77 65 - 99 mg/dL   POCT glucose device results    Collection Time: 03/03/23  7:44 AM   Result Value Ref Range    POC Glucose, Blood 84 65 - 99 mg/dL   POCT glucose device results    Collection Time: 03/03/23 11:06 AM   Result Value Ref Range    POC Glucose, Blood 77 65 - 99 mg/dL       Medications:  Scheduled Medications   Medication Dose Frequency    lurasidone  20 mg PM MEAL    [START ON 3/4/2023] lisinopril  2.5 mg DAILY    metformin  500 mg BID WITH MEALS    insulin regular  2-12 Units 4X/DAY ACHS    insulin GLARGINE  32 Units BID    senna-docusate  2 Tablet BID    omeprazole  20 mg DAILY    atorvastatin  20 mg Q EVENING    levothyroxine  200 mcg AM ES    levothyroxine  25 mcg AM ES    vitamin D3  4,000 Units Q EVENING    divalproex  1,500 mg QHS    fenofibrate micronized  134 mg DAILY    montelukast  10 mg Q EVENING    prazosin  5 mg Q EVENING    sertraline  150 mg QHS    levETIRAcetam  1,000 mg Q12HRS    divalproex  500 mg DAILY    loratadine  10 mg DAILY    topiramate  50 mg Q12HRS    traZODone  100 mg QHS    dorzolamide-timolol  1 Drop BID    budesonide-formoterol  2 Puff BID (RT)    enoxaparin (LOVENOX) injection  40 mg DAILY     PRN medications: insulin regular **AND** POC blood glucose manual result **AND** NOTIFY MD and PharmD **AND** Administer 20 grams of glucose (approximately 8 ounces of fruit juice) every 15 minutes PRN FSBG less than 70 mg/dL **AND** dextrose bolus, Respiratory Therapy Consult, hydrALAZINE, acetaminophen, senna-docusate **AND** polyethylene glycol/lytes **AND** magnesium hydroxide **AND** bisacodyl, mag hydrox-al hydrox-simeth, ondansetron **OR** ondansetron, traZODone, sodium chloride, traMADol, midazolam, albuterol    Diet:  Current Diet Order   Procedures    Diet Order Diet:  Consistent CHO (Diabetic)       Assessment:  Active Hospital Problems    Diagnosis     *Seizure disorder (HCC)     Acute right-sided weakness     Primary hypertension     Mixed hyperlipidemia     Type 2 diabetes mellitus without complication, without long-term current use of insulin (HCC)     PTSD (post-traumatic stress disorder)     Neck mass     Postoperative hypothyroidism     Anxiety and depression        Medical Decision Making and Plan:  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  -PT and OT for mobility and ADLs. Per guidelines, 15 hours per week between PT, OT and SLP.  -Follow-up APAP/Tramadol  -Continue Keppra and Depakote     HTN - Patient on Lisinopril 10 mg     DM2 with hyperglycemia - Patient on Lantus and SSI as well as metformin, Januvia. Blood sugars elevated, consult hospitalist. A1c 7.8. Ongoing hyperglycemia. Continue Lantus and SSI     HLD - Patient on Atorvastatin 20 mg QHS and Lofibra     Glaucoma - Patient on Cosopt      BPD/Mood disorder - Patient on Depakote 500/1500, Prazosin 5 mg daily, Sertraline 150 mg, Topamax 50 mg BID, Latuda 80 mg QHS and Trazodone 100 mg   -Consult Neuropsychology. Has history of suicide attempts per chart reviewed. Discussed case with Dr. Cervantes . Home dose is Latuda 20 mg, will reduce    Anemia - 13.9 on admission, repeat 3/2 - 13.1    Azotemia - BUN 28 on 3/2/23. Encourage PO fluids for now. If no improvement will need IVF    Hypothyroidism - Patient on 225 mcg of Levothyroxine. TSH elevated but T4 normal.      Asthma - Patient on Singulair, Claritin, and Symbicort     Pain - APAP/Tramadol PRN     Skin - Patient at risk for skin breakdown due to debility in areas including sacrum, achilles, elbows and head in addition to other sites. Nursing to assess skin daily.     GI Ppx - Patient on Prilosec for GERD prophylaxis. Patient on  Senna-docusate for constipation prophylaxis.      DVT Ppx - Patient Lovenox on transfer.  ____________________________________    T. Benjamin Smith MD./PhD.  [facility] - Physical Medicine & Rehabilitation   [facility] - Brain Injury Medicine   ____________________________________  \

## 2023-03-03 NOTE — CARE PLAN
"  Problem: Knowledge Deficit - Standard  Goal: Patient and family/care givers will demonstrate understanding of plan of care, disease process/condition, diagnostic tests and medications  Outcome: Progressing     Problem: Fall Risk - Rehab  Goal: Patient will remain free from falls  Outcome: Progressing  Note: Mariluz Ball Fall risk Assessment Score: 12    Moderate fall risk Interventions  - Bed and strip alarm   - Yellow sign by the door   - Yellow wrist band \"Fall risk\"  - Room near to the nurse station  - Fall risk education provided     "

## 2023-03-03 NOTE — PROGRESS NOTES
Assumed care of patient. Bedside report received from Lola ANNE. Patient is in bed. Fall precautions in place. Call light and belongings within reach. Updated on POC, all questions and concerns answered at this time. No other needs at this time.

## 2023-03-03 NOTE — THERAPY
Speech Language Pathology  Daily Treatment     Patient Name: Norm Prabhakar  Age:  42 y.o., Sex:  male  Medical Record #: [id number]  Today's Date: 3/3/2023     Precautions  Precautions: Fall Risk, Other (See Comments)  Comments: seizure, R hemiparesis; possible conversion disorder    Subjective    Patient found in bed at the beginning of ST session. Patient pleasant and agreeable to ST services this session. Patient recalled his primary speech therapists name and inquired about her absence this session.     Objective       03/03/23 1001   Receptive Language / Auditory Comprehension   Follows Two Unit Commands Within Functional Limits (6-7)   Reading Comprehension    Following Written Direction Within Functional Limits (6-7)   Cognition   Functional Memory Activities Minimal (4)       Assessment    Patient was able to recall events from the previous day given minimal verbal cues from the clinician. Patient independently recalled what he had for breakfast and what he did in occupational therapy this day.    Patient completed a written, two-component direction worksheet this session. Patient independently followed two-component written directions with no cues or aides from the clinician.    Strengths: Able to follow instructions, Alert and oriented, Supportive family, Willingly participates in therapeutic activities  Barriers: Impaired carryover of learning, Impaired insight/denial of deficits, Impaired functional cognition    Plan    Continue memory log; basic problem solving      Speech Therapy Problems (Active)       Problem: Memory STGs       Dates: Start: 02/28/23         Goal: STG-Within one week, patient will recall new training, daily events with 75% acc with min A.       Dates: Start: 02/28/23   Expected End: 03/11/23         Goal Note filed on 03/02/23 1314 by Lulu Leija MS,CCC-SLP       Min to mod cues for recall of new training, with low attention to new training.              Goal: STG-Within one  week, patient will       Dates: Start: 02/28/23   Expected End: 03/11/23               Problem: Speech/Swallowing LTGs       Dates: Start: 02/28/23         Goal: LTG-By discharge, patient will solve basic problems and recall safety training with 80% acc with min ALyric       Dates: Start: 02/28/23   Expected End: 03/11/23

## 2023-03-03 NOTE — FLOWSHEET NOTE
03/03/23 0801   Events/Summary/Plan   Events/Summary/Plan o2 spot check   Vital Signs   Pulse 60   Respiration 16   Pulse Oximetry 97 %   $ Pulse Oximetry (Spot Check) Yes   Respiratory Assessment   Level of Consciousness Alert   Chest Exam   Work Of Breathing / Effort Within Normal Limits   Breath Sounds   RUL Breath Sounds Clear   RML Breath Sounds Clear   RLL Breath Sounds Clear   LEONOR Breath Sounds Clear   LLL Breath Sounds Clear   Oxygen   O2 Delivery Device None - Room Air

## 2023-03-03 NOTE — THERAPY
"Physical Therapy   Daily Treatment     Patient Name: Norm Prabhakar  Age:  42 y.o., Sex:  male  Medical Record #: [id number]  Today's Date: 3/3/2023     Precautions  Precautions: (P) Fall Risk  Comments: (P) seizure, R hemiparesis; possible conversion disorder    Subjective    Patient seated in w/c with family present dropping off medication, agreeable to therapy.     Objective       03/03/23 1101   PT Charge Group   PT Gait Training (Units) 1   PT Therapeutic Activities (Units) 1   PT Total Time Spent   PT Individual Total Time Spent (Mins) 30   Precautions   Precautions Fall Risk   Comments seizure, R hemiparesis; possible conversion disorder   Gait Functional Level of Assist    Gait Level Of Assist Contact Guard Assist   Assistive Device Front Wheel Walker   Distance (Feet) 30   # of Times Distance was Traveled 1   Deviation Bradykinetic;Shuffled Gait;Decreased Heel Strike;Decreased Toe Off  (increased double leg stance time)   Bed Mobility    Sit to Stand Contact Guard Assist   Interdisciplinary Plan of Care Collaboration   Patient Position at End of Therapy Seated;Chair Alarm On;Self Releasing Lap Belt Applied;Call Light within Reach;Tray Table within Reach;Phone within Reach     3\" step negotiation with BUE support on parallel bars, LLE leading on ascent, RLE on descent, x2 with CGA.    Retro walking in parallel bars with CGA and cues for increasing and equalizing step length x8 ft.    Assessment    Patient tolerated session well, focus of session on gait training and initiating stair negotiation for LE strengthening purposes. Patient reporting moderate muscular fatigue by end of session.    Strengths: Able to follow instructions, Alert and oriented, Effective communication skills, Independent prior level of function, Motivated for self care and independence, Pleasant and cooperative, Willingly participates in therapeutic activities  Barriers: Decreased endurance, Fatigue, Hemiparesis, Impaired activity " tolerance, Impaired balance, Impaired functional cognition, Limited mobility (hx of TBI and CVA)    Plan    Gait short distances in // bars progressing to FWW, squat pivot progressing to stand pivot FWW txs, stairs/curb when appropriate, LE strengthening and endurance     Passport items to be completed:  Get in/out of bed safely, in/out of a vehicle, safely use mobility device, walk or wheel around home/community, navigate up and down stairs, show how to get up/down from the ground, ensure home is accessible, demonstrate HEP, complete caregiver training    Physical Therapy Problems (Active)       Problem: Mobility       Dates: Start: 02/28/23         Goal: STG-Within one week, patient will ambulate household distance 25 ft x2 with FWW and CGA.       Dates: Start: 02/28/23   Expected End: 03/11/23         Goal Note filed on 03/02/23 1342 by Elke Hernandez, PT       20 ft x2 with min A and wc follow              Goal: STG-Within one week, patient will ambulate up/down a curb with FWW and min A.       Dates: Start: 02/28/23   Expected End: 03/11/23         Goal Note filed on 03/02/23 1342 by Elke Hernandez, PT       Not yet assessed d/t safety concerns              Goal: STG-Within one week, patient will ambulate up/down flight of stairs with min A using BHR and RB prn.       Dates: Start: 02/28/23   Expected End: 03/11/23         Goal Note filed on 03/02/23 1342 by Elke Hernandez, PT       Not yet assessed d/t safety concerns                 Problem: Mobility Transfers       Dates: Start: 02/28/23         Goal: STG-Within one week, patient will transfer bed to chair with FWW and SBA.       Dates: Start: 02/28/23   Expected End: 03/11/23         Goal Note filed on 03/02/23 1342 by Elke Hernandez, PT       CGA                 Problem: PT-Long Term Goals       Dates: Start: 02/28/23         Goal: LTG-By discharge, patient will ambulate household distances at least 150 ft with FWW vs LRAD and SPV.       Dates: Start: 02/28/23    Expected End: 03/11/23            Goal: LTG-By discharge, patient will transfer one surface to another with FWW vs LRAD and SPV.       Dates: Start: 02/28/23   Expected End: 03/11/23            Goal: LTG-By discharge, patient will ambulate up/down flight of stairs with BHR and SPV requiring no RB.       Dates: Start: 02/28/23   Expected End: 03/11/23            Goal: LTG-By discharge, patient will transfer in/out of a car with FWW vs LRAD and set up A.       Dates: Start: 02/28/23   Expected End: 03/11/23

## 2023-03-04 LAB
ANION GAP SERPL CALC-SCNC: 12 MMOL/L (ref 7–16)
BUN SERPL-MCNC: 23 MG/DL (ref 8–22)
CALCIUM SERPL-MCNC: 8.9 MG/DL (ref 8.5–10.5)
CHLORIDE SERPL-SCNC: 102 MMOL/L (ref 96–112)
CO2 SERPL-SCNC: 25 MMOL/L (ref 20–33)
CREAT SERPL-MCNC: 0.83 MG/DL (ref 0.5–1.4)
ERYTHROCYTE [DISTWIDTH] IN BLOOD BY AUTOMATED COUNT: 41.4 FL (ref 35.9–50)
GFR SERPLBLD CREATININE-BSD FMLA CKD-EPI: 113 ML/MIN/1.73 M 2
GLUCOSE BLD STRIP.AUTO-MCNC: 107 MG/DL (ref 65–99)
GLUCOSE BLD STRIP.AUTO-MCNC: 83 MG/DL (ref 65–99)
GLUCOSE BLD STRIP.AUTO-MCNC: 91 MG/DL (ref 65–99)
GLUCOSE BLD STRIP.AUTO-MCNC: 94 MG/DL (ref 65–99)
GLUCOSE SERPL-MCNC: 73 MG/DL (ref 65–99)
HCT VFR BLD AUTO: 39.4 % (ref 42–52)
HGB BLD-MCNC: 13 G/DL (ref 14–18)
MCH RBC QN AUTO: 30.1 PG (ref 27–33)
MCHC RBC AUTO-ENTMCNC: 33 G/DL (ref 33.7–35.3)
MCV RBC AUTO: 91.2 FL (ref 81.4–97.8)
PLATELET # BLD AUTO: 238 K/UL (ref 164–446)
PMV BLD AUTO: 11.1 FL (ref 9–12.9)
POTASSIUM SERPL-SCNC: 3.9 MMOL/L (ref 3.6–5.5)
RBC # BLD AUTO: 4.32 M/UL (ref 4.7–6.1)
SODIUM SERPL-SCNC: 139 MMOL/L (ref 135–145)
WBC # BLD AUTO: 7.1 K/UL (ref 4.8–10.8)

## 2023-03-04 PROCEDURE — 85027 COMPLETE CBC AUTOMATED: CPT

## 2023-03-04 PROCEDURE — 99232 SBSQ HOSP IP/OBS MODERATE 35: CPT | Performed by: HOSPITALIST

## 2023-03-04 PROCEDURE — 700102 HCHG RX REV CODE 250 W/ 637 OVERRIDE(OP): Performed by: HOSPITALIST

## 2023-03-04 PROCEDURE — 97530 THERAPEUTIC ACTIVITIES: CPT

## 2023-03-04 PROCEDURE — 770010 HCHG ROOM/CARE - REHAB SEMI PRIVAT*

## 2023-03-04 PROCEDURE — 97130 THER IVNTJ EA ADDL 15 MIN: CPT

## 2023-03-04 PROCEDURE — 97116 GAIT TRAINING THERAPY: CPT

## 2023-03-04 PROCEDURE — 97535 SELF CARE MNGMENT TRAINING: CPT

## 2023-03-04 PROCEDURE — 94640 AIRWAY INHALATION TREATMENT: CPT

## 2023-03-04 PROCEDURE — A9270 NON-COVERED ITEM OR SERVICE: HCPCS | Performed by: PHYSICAL MEDICINE & REHABILITATION

## 2023-03-04 PROCEDURE — 700111 HCHG RX REV CODE 636 W/ 250 OVERRIDE (IP): Performed by: PHYSICAL MEDICINE & REHABILITATION

## 2023-03-04 PROCEDURE — 700102 HCHG RX REV CODE 250 W/ 637 OVERRIDE(OP): Performed by: PHYSICAL MEDICINE & REHABILITATION

## 2023-03-04 PROCEDURE — 80048 BASIC METABOLIC PNL TOTAL CA: CPT

## 2023-03-04 PROCEDURE — 94760 N-INVAS EAR/PLS OXIMETRY 1: CPT

## 2023-03-04 PROCEDURE — 97129 THER IVNTJ 1ST 15 MIN: CPT

## 2023-03-04 PROCEDURE — 82962 GLUCOSE BLOOD TEST: CPT | Mod: 91

## 2023-03-04 PROCEDURE — 97110 THERAPEUTIC EXERCISES: CPT

## 2023-03-04 PROCEDURE — 36415 COLL VENOUS BLD VENIPUNCTURE: CPT

## 2023-03-04 PROCEDURE — A9270 NON-COVERED ITEM OR SERVICE: HCPCS | Performed by: HOSPITALIST

## 2023-03-04 RX ADMIN — ENOXAPARIN SODIUM 40 MG: 40 INJECTION SUBCUTANEOUS at 08:31

## 2023-03-04 RX ADMIN — BUDESONIDE AND FORMOTEROL FUMARATE DIHYDRATE 2 PUFF: 160; 4.5 AEROSOL RESPIRATORY (INHALATION) at 08:11

## 2023-03-04 RX ADMIN — SENNOSIDES AND DOCUSATE SODIUM 2 TABLET: 50; 8.6 TABLET ORAL at 22:52

## 2023-03-04 RX ADMIN — OMEPRAZOLE 20 MG: 20 CAPSULE, DELAYED RELEASE ORAL at 08:28

## 2023-03-04 RX ADMIN — PRAZOSIN HYDROCHLORIDE 5 MG: 5 CAPSULE ORAL at 23:06

## 2023-03-04 RX ADMIN — MONTELUKAST 10 MG: 10 TABLET, FILM COATED ORAL at 22:53

## 2023-03-04 RX ADMIN — DIVALPROEX SODIUM 1500 MG: 500 TABLET, DELAYED RELEASE ORAL at 22:50

## 2023-03-04 RX ADMIN — ACETAMINOPHEN 650 MG: 325 TABLET ORAL at 15:58

## 2023-03-04 RX ADMIN — LEVOTHYROXINE SODIUM 25 MCG: 0.03 TABLET ORAL at 05:43

## 2023-03-04 RX ADMIN — LEVETIRACETAM 1000 MG: 500 TABLET, FILM COATED ORAL at 22:49

## 2023-03-04 RX ADMIN — FENOFIBRATE 134 MG: 67 CAPSULE ORAL at 08:28

## 2023-03-04 RX ADMIN — DIVALPROEX SODIUM 500 MG: 500 TABLET, DELAYED RELEASE ORAL at 08:28

## 2023-03-04 RX ADMIN — TOPIRAMATE 50 MG: 25 TABLET, FILM COATED ORAL at 22:51

## 2023-03-04 RX ADMIN — BUDESONIDE AND FORMOTEROL FUMARATE DIHYDRATE 2 PUFF: 160; 4.5 AEROSOL RESPIRATORY (INHALATION) at 23:00

## 2023-03-04 RX ADMIN — METFORMIN HYDROCHLORIDE 500 MG: 500 TABLET ORAL at 08:28

## 2023-03-04 RX ADMIN — TRAZODONE HYDROCHLORIDE 100 MG: 100 TABLET ORAL at 22:51

## 2023-03-04 RX ADMIN — TRAMADOL HYDROCHLORIDE 50 MG: 50 TABLET, COATED ORAL at 17:37

## 2023-03-04 RX ADMIN — LURASIDONE HYDROCHLORIDE 20 MG: 20 TABLET, FILM COATED ORAL at 17:37

## 2023-03-04 RX ADMIN — TOPIRAMATE 50 MG: 25 TABLET, FILM COATED ORAL at 08:28

## 2023-03-04 RX ADMIN — SERTRALINE HYDROCHLORIDE 150 MG: 50 TABLET, FILM COATED ORAL at 22:53

## 2023-03-04 RX ADMIN — TRAMADOL HYDROCHLORIDE 50 MG: 50 TABLET, COATED ORAL at 12:06

## 2023-03-04 RX ADMIN — DORZOLAMIDE HYDROCHLORIDE AND TIMOLOL MALEATE 1 DROP: 20; 5 SOLUTION/ DROPS OPHTHALMIC at 23:01

## 2023-03-04 RX ADMIN — ATORVASTATIN CALCIUM 20 MG: 10 TABLET, FILM COATED ORAL at 22:51

## 2023-03-04 RX ADMIN — DORZOLAMIDE HYDROCHLORIDE AND TIMOLOL MALEATE 1 DROP: 20; 5 SOLUTION/ DROPS OPHTHALMIC at 08:31

## 2023-03-04 RX ADMIN — LISINOPRIL 2.5 MG: 5 TABLET ORAL at 08:28

## 2023-03-04 RX ADMIN — LEVOTHYROXINE SODIUM 200 MCG: 0.12 TABLET ORAL at 05:43

## 2023-03-04 RX ADMIN — Medication 4000 UNITS: at 22:52

## 2023-03-04 RX ADMIN — LEVETIRACETAM 1000 MG: 500 TABLET, FILM COATED ORAL at 08:28

## 2023-03-04 RX ADMIN — LORATADINE 10 MG: 10 TABLET ORAL at 08:28

## 2023-03-04 ASSESSMENT — PAIN DESCRIPTION - PAIN TYPE
TYPE: ACUTE PAIN

## 2023-03-04 ASSESSMENT — GAIT ASSESSMENTS
GAIT LEVEL OF ASSIST: CONTACT GUARD ASSIST
ASSISTIVE DEVICE: FRONT WHEEL WALKER
DISTANCE (FEET): 85
DEVIATION: BRADYKINETIC;SHUFFLED GAIT;INCREASED BASE OF SUPPORT

## 2023-03-04 ASSESSMENT — ACTIVITIES OF DAILY LIVING (ADL)
BED_CHAIR_WHEELCHAIR_TRANSFER_DESCRIPTION: INCREASED TIME;SET-UP OF EQUIPMENT;SQUAT PIVOT TRANSFER TO WHEELCHAIR;SUPERVISION FOR SAFETY
BED_CHAIR_WHEELCHAIR_TRANSFER_DESCRIPTION: INCREASED TIME;SET-UP OF EQUIPMENT;VERBAL CUEING

## 2023-03-04 NOTE — THERAPY
Physical Therapy   Daily Treatment     Patient Name: Norm Prabhakar  Age:  42 y.o., Sex:  male  Medical Record #: [id number]  Today's Date: 3/3/2023     Precautions  Precautions: Fall Risk  Comments: seizure, R hemiparesis; possible conversion disorder    Subjective    Pt sleeping in bed, easily awoken and willing to participate     Objective       03/03/23 1501   PT Charge Group   PT Gait Training (Units) 1   PT Therapeutic Exercise (Units) 2   PT Therapeutic Activities (Units) 1   PT Total Time Spent   PT Individual Total Time Spent (Mins) 60   Gait Functional Level of Assist    Gait Level Of Assist Contact Guard Assist   Assistive Device Front Wheel Walker   Distance (Feet) 30   # of Times Distance was Traveled 2   Deviation Bradykinetic;Shuffled Gait;Decreased Heel Strike;Decreased Toe Off   Transfer Functional Level of Assist   Bed, Chair, Wheelchair Transfer Contact Guard Assist   Bed Chair Wheelchair Transfer Description Adaptive equipment;Increased time;Set-up of equipment   Sitting Lower Body Exercises   Ankle Pumps 3 sets of 10   Hip Flexion 3 sets of 10   Hip Abduction 3 sets of 10   Hip Adduction 3 sets of 10   Long Arc Quad 3 sets of 10   Hamstring Curl 3 sets of 10   Nustep Resistance Level 3  (10 minutes for general cardio endurance training)   Bed Mobility    Supine to Sit Standby Assist   Sit to Supine Contact Guard Assist   Sit to Stand Contact Guard Assist         Assessment    Pt tolerated increased gait distances and seated exercises today but fatigued and frequently yawning throughout session.    Strengths: Able to follow instructions, Alert and oriented, Effective communication skills, Independent prior level of function, Motivated for self care and independence, Pleasant and cooperative, Willingly participates in therapeutic activities  Barriers: Decreased endurance, Fatigue, Hemiparesis, Impaired activity tolerance, Impaired balance, Impaired functional cognition, Limited mobility (hx of TBI  and CVA)    Plan    Gait short distances in // bars progressing to FWW, squat pivot progressing to stand pivot FWW txs, stairs/curb when appropriate, LE strengthening and endurance     Passport items to be completed:  Get in/out of bed safely, in/out of a vehicle, safely use mobility device, walk or wheel around home/community, navigate up and down stairs, show how to get up/down from the ground, ensure home is accessible, demonstrate HEP, complete caregiver training      Physical Therapy Problems (Active)       Problem: Mobility       Dates: Start: 02/28/23         Goal: STG-Within one week, patient will ambulate household distance 25 ft x2 with FWW and CGA.       Dates: Start: 02/28/23   Expected End: 03/11/23         Goal Note filed on 03/02/23 1342 by Elke Hernandez, PT       20 ft x2 with min A and wc follow              Goal: STG-Within one week, patient will ambulate up/down a curb with FWW and min A.       Dates: Start: 02/28/23   Expected End: 03/11/23         Goal Note filed on 03/02/23 1342 by Elke Hernandez, PT       Not yet assessed d/t safety concerns              Goal: STG-Within one week, patient will ambulate up/down flight of stairs with min A using BHR and RB prn.       Dates: Start: 02/28/23   Expected End: 03/11/23         Goal Note filed on 03/02/23 1342 by Elke Hernandez, PT       Not yet assessed d/t safety concerns                 Problem: Mobility Transfers       Dates: Start: 02/28/23         Goal: STG-Within one week, patient will transfer bed to chair with FWW and SBA.       Dates: Start: 02/28/23   Expected End: 03/11/23         Goal Note filed on 03/02/23 1342 by Elke Hernandez, PT       CGA                 Problem: PT-Long Term Goals       Dates: Start: 02/28/23         Goal: LTG-By discharge, patient will ambulate household distances at least 150 ft with FWW vs LRAD and SPV.       Dates: Start: 02/28/23   Expected End: 03/11/23            Goal: LTG-By discharge, patient will transfer one  surface to another with FWW vs LRAD and SPV.       Dates: Start: 02/28/23   Expected End: 03/11/23            Goal: LTG-By discharge, patient will ambulate up/down flight of stairs with BHR and SPV requiring no RB.       Dates: Start: 02/28/23   Expected End: 03/11/23            Goal: LTG-By discharge, patient will transfer in/out of a car with FWW vs LRAD and set up A.       Dates: Start: 02/28/23   Expected End: 03/11/23

## 2023-03-04 NOTE — CARE PLAN
The patient is Stable - Low risk of patient condition declining or worsening    Shift Goals  Clinical Goals: Safety  Patient Goals: Sleep    Progress made toward(s) clinical / shift goals:    Problem: Knowledge Deficit - Standard  Goal: Patient and family/care givers will demonstrate understanding of plan of care, disease process/condition, diagnostic tests and medications  Outcome: Progressing     Problem: Bladder / Voiding  Goal: Patient will establish and maintain bladder regimen  Outcome: Progressing     Problem: Skin Integrity  Goal: Patient's skin integrity will be maintained or improve  Outcome: Progressing     Problem: Diabetes Management  Goal: Patient's ability to maintain appropriate glucose levels will be maintained or improve  Outcome: Progressing       Patient is not progressing towards the following goals:

## 2023-03-04 NOTE — THERAPY
"Physical Therapy   Daily Treatment     Patient Name: Norm Prabhakar  Age:  42 y.o., Sex:  male  Medical Record #: [id number]  Today's Date: 3/4/2023     Precautions  Precautions: Fall Risk  Comments: seizure, R hemiparesis; possible conversion disorder    Subjective    \"I wish I could get a morphine shot for my back\"     Objective       03/04/23 1350   PT Charge Group   PT Gait Training (Units) 1   PT Therapeutic Exercise (Units) 1   PT Therapeutic Activities (Units) 2   PT Total Time Spent   PT Individual Total Time Spent (Mins) 60   Pain 0 - 10 Group   Location Back   Location Orientation Mid;Lower   Pain Rating Scale (NPRS) 7   Description Aching   Comfort Goal Perform Activity;Comfort with Movement   Therapist Pain Assessment Post Activity;Nurse Notified   Gait Functional Level of Assist    Gait Level Of Assist Contact Guard Assist   Assistive Device Front Wheel Walker   Distance (Feet) 85   # of Times Distance was Traveled 1  (as well as 35ftx2)   Deviation Bradykinetic;Shuffled Gait;Increased Base Of Support  (tremulous L LE)   Wheelchair Functional Level of Assist   Wheelchair Assist Supervised   Distance Wheelchair (Feet or Distance) 300   Wheelchair Description Extra time;Impaired coordination;Supervision for safety;Verbal cueing  (VC's to use R LE in propulsion)   Stairs Functional Level of Assist   Level of Assist with Stairs Refused   Transfer Functional Level of Assist   Bed, Chair, Wheelchair Transfer Contact Guard Assist   Bed Chair Wheelchair Transfer Description Increased time;Set-up of equipment;Verbal cueing  (VC's for WC brakes)   Sitting Lower Body Exercises   Nustep Resistance Level 3  (B UE/LE 5 min for warm up 311 steps)   Bed Mobility    Supine to Sit Modified Independent   Sit to Supine Modified Independent   Sit to Stand Contact Guard Assist   Neuro-Muscular Treatments   Comments dynamic standing balance with intermittent UE support transferring clothes from washing machine to dryer "   Interdisciplinary Plan of Care Collaboration   IDT Collaboration with  Nursing   Patient Position at End of Therapy In Bed;Call Light within Reach;Tray Table within Reach;Phone within Reach   Collaboration Comments SBAR with RN regarding pt's progress with ambulation and aggravated back pain     Pt reported increased LBP at end of session however it did not limit activity during tx. Provided with hot pack to low back in supine    Assessment    Pt was able to progress his ambulation distance to 85 ft with FWW before requiring a seated rest break. Pt benefits from redirection to continue participating in activity as he can be distracted easily. Pt refused stair training but is open to completing prior to scheduled DC.  Strengths: Able to follow instructions, Alert and oriented, Effective communication skills, Independent prior level of function, Motivated for self care and independence, Pleasant and cooperative, Willingly participates in therapeutic activities  Barriers: Decreased endurance, Fatigue, Hemiparesis, Impaired activity tolerance, Impaired balance, Impaired functional cognition, Limited mobility (hx of TBI and CVA)    Plan    Consider TENS during tx if back pain is limiting activity,    Gait short distances in // bars progressing to FWW, squat pivot progressing to stand pivot FWW txs, stairs/curb when appropriate, LE strengthening and endurance     Passport items to be completed:  Get in/out of bed safely, in/out of a vehicle, safely use mobility device, walk or wheel around home/community, navigate up and down stairs, show how to get up/down from the ground, ensure home is accessible, demonstrate HEP, complete caregiver training         Physical Therapy Problems (Active)       Problem: Mobility       Dates: Start: 02/28/23         Goal: STG-Within one week, patient will ambulate household distance 25 ft x2 with FWW and CGA.       Dates: Start: 02/28/23   Expected End: 03/11/23         Goal Note filed on  03/02/23 1342 by Elke Hernandez, PT       20 ft x2 with min A and wc follow              Goal: STG-Within one week, patient will ambulate up/down a curb with FWW and min A.       Dates: Start: 02/28/23   Expected End: 03/11/23         Goal Note filed on 03/02/23 1342 by Elke Hernandez, PT       Not yet assessed d/t safety concerns              Goal: STG-Within one week, patient will ambulate up/down flight of stairs with min A using BHR and RB prn.       Dates: Start: 02/28/23   Expected End: 03/11/23         Goal Note filed on 03/02/23 1342 by Elke Hernandez, PT       Not yet assessed d/t safety concerns                 Problem: Mobility Transfers       Dates: Start: 02/28/23         Goal: STG-Within one week, patient will transfer bed to chair with FWW and SBA.       Dates: Start: 02/28/23   Expected End: 03/11/23         Goal Note filed on 03/02/23 1342 by Elke Hernandez, PT       CGA                 Problem: PT-Long Term Goals       Dates: Start: 02/28/23         Goal: LTG-By discharge, patient will ambulate household distances at least 150 ft with FWW vs LRAD and SPV.       Dates: Start: 02/28/23   Expected End: 03/11/23            Goal: LTG-By discharge, patient will transfer one surface to another with FWW vs LRAD and SPV.       Dates: Start: 02/28/23   Expected End: 03/11/23            Goal: LTG-By discharge, patient will ambulate up/down flight of stairs with BHR and SPV requiring no RB.       Dates: Start: 02/28/23   Expected End: 03/11/23            Goal: LTG-By discharge, patient will transfer in/out of a car with FWW vs LRAD and set up A.       Dates: Start: 02/28/23   Expected End: 03/11/23

## 2023-03-04 NOTE — THERAPY
Speech Language Pathology  Daily Treatment     Patient Name: Norm Prabhakar  Age:  42 y.o., Sex:  male  Medical Record #: [id number]  Today's Date: 3/4/2023     Precautions  Precautions: Fall Risk  Comments: seizure, R hemiparesis; possible conversion disorder    Subjective    Pt seen at bedside; pleasant and cooperative during ST     Objective       03/04/23 0931   Treatment Charges   SLP Cognitive Skill Development First 15 Minutes 1   SLP Cognitive Skill Development Additional 15 Minutes 1   SLP Total Time Spent   SLP Individual Total Time Spent (Mins) 30   Interdisciplinary Plan of Care Collaboration   Patient Position at End of Therapy In Bed;Bed Alarm On;Call Light within Reach;Tray Table within Reach;Phone within Reach         Assessment    Pt had not completed memory log prior to ST. Pt required min A to complete for this day and for attention to task. Encouraged pt to complete memory log daily to reinforce recall and orientation- pt agreeable.   Pt completed written four component directions (concrete and abstract) indep with 100% accuracy. Pt required occasional cues for redirection to task (showing SLP things on his phone). Pt completed memory log for ST at the end of session.     Strengths: Able to follow instructions, Alert and oriented, Supportive family, Willingly participates in therapeutic activities  Barriers: Impaired carryover of learning, Impaired insight/denial of deficits, Impaired functional cognition    Plan    Continue to reinforce daily memory log, target attention and problem solving     Passport items to be completed:  Express basic needs, understand food/liquid recommendations, consistently follow swallow precautions, manage finances, manage medications, arrive to therapy appointments on time, complete daily memory log entries, solve problems related to safety situations, review education related to hospitalization, complete caregiver training     Speech Therapy Problems (Active)        Problem: Memory STGs       Dates: Start: 02/28/23         Goal: STG-Within one week, patient will recall new training, daily events with 75% acc with min A.       Dates: Start: 02/28/23   Expected End: 03/11/23         Goal Note filed on 03/02/23 1314 by Lulu Leija MS,CCC-SLP       Min to mod cues for recall of new training, with low attention to new training.              Goal: STG-Within one week, patient will       Dates: Start: 02/28/23   Expected End: 03/11/23               Problem: Speech/Swallowing LTGs       Dates: Start: 02/28/23         Goal: LTG-By discharge, patient will solve basic problems and recall safety training with 80% acc with min A.       Dates: Start: 02/28/23   Expected End: 03/11/23

## 2023-03-04 NOTE — THERAPY
Occupational Therapy  Daily Treatment     Patient Name: Norm Prabhakar  Age:  42 y.o., Sex:  male  Medical Record #: [id number]  Today's Date: 3/4/2023     Precautions  Precautions: (P) Fall Risk  Comments: (P) seizure, R hemiparesis; possible conversion disorder         Subjective    Pt found in room, awake, resting in bed. Agreeable to OT focused on ADLs this date.      Objective       03/04/23 1031   OT Charge Group   Charges Yes   OT Self Care / ADL (Units) 4   OT Total Time Spent   OT Individual Total Time Spent (Mins) 60   Precautions   Precautions Fall Risk   Comments seizure, R hemiparesis; possible conversion disorder   Cognition    Level of Consciousness Alert   Sleep/Wake Cycle   Sleep & Rest Awake;Resting   Functional Level of Assist   Grooming Supervision;Seated   Grooming Description Seated in wheelchair at sink;Supervision for safety   Upper Body Dressing Supervision   Upper Body Dressing Description Supervision for safety  (to doff pullover shirt)   Lower Body Dressing Contact Guard Assist   Lower Body Dressing Description Set-up of equipment;Supervision for safety  (tactile cues to incorporate RUE)   Bed, Chair, Wheelchair Transfer Minimal Assist   Bed Chair Wheelchair Transfer Description Increased time;Set-up of equipment;Squat pivot transfer to wheelchair;Supervision for safety   Balance   Sitting Balance (Static) Fair +   Sitting Balance (Dynamic) Fair   Standing Balance (Static) Poor +   Bed Mobility    Supine to Sit Standby Assist   Interdisciplinary Plan of Care Collaboration   IDT Collaboration with  Nursing   Patient Position at End of Therapy Seated;Chair Alarm On;Call Light within Reach;Tray Table within Reach;Phone within Reach   Collaboration Comments RN present at end of session     Pt completed sponge bath seated in bathroom 2/2 urinating on pants requiring assist for cleaning perineal region. He completed head wash with non-rinse cleansing cap independently.     Assessment    Pt  tolerated therapy well this date focused on ADLs. He was resistant to standing d/t feeling that his legs are still too weak. Ed re: increasing standing tolerance/balance. Pt was left in room with all needs met, happy, and RN present.     Strengths: Able to follow instructions, Effective communication skills, Motivated for self care and independence, Manages pain appropriately, Pleasant and cooperative, Supportive family, Willingly participates in therapeutic activities, Alert and oriented, Independent prior level of function  Barriers: Decreased endurance, Hemiparesis, Impaired activity tolerance, Impaired balance, Limited mobility      Plan     ADLs, IADLs, transfers, functional mobility, R UE AROM/strengthening, standing tolerance/balance    Occupational Therapy Goals (Active)       Problem: Bathing       Dates: Start: 02/28/23         Goal: STG-Within one week, patient will bathe with SBA using grab bars as needed.        Dates: Start: 02/28/23   Expected End: 03/11/23         Goal Note filed on 03/02/23 1346 by Juan Preston, OT/L       CGA                 Problem: Dressing       Dates: Start: 03/02/23         Goal: STG-Within one week, patient will dress LB with setup and supervision       Dates: Start: 03/02/23   Expected End: 03/11/23               Problem: Functional Transfers       Dates: Start: 03/02/23         Goal: STG-Within one week, patient will transfer to toilet with setup and supervision       Dates: Start: 03/02/23   Expected End: 03/11/23               Problem: OT Long Term Goals       Dates: Start: 02/28/23         Goal: LTG-By discharge, patient will complete basic self care tasks with supervision.       Dates: Start: 02/28/23   Expected End: 03/11/23            Goal: LTG-By discharge, patient will perform bathroom transfers with supervision.       Dates: Start: 02/28/23   Expected End: 03/11/23            Goal: LTG-By discharge, patient will complete basic home management with supervision.         Dates: Start: 02/28/23   Expected End: 03/11/23               Problem: Toileting       Dates: Start: 03/02/23         Goal: STG-Within one week, patient will complete toileting tasks with setup and supervision       Dates: Start: 03/02/23   Expected End: 03/11/23

## 2023-03-04 NOTE — FLOWSHEET NOTE
03/04/23 0812   Vital Signs   Pulse 66   Respiration 12   Pulse Oximetry 96 %   $ Pulse Oximetry (Spot Check) Yes   Respiratory Assessment   Level of Consciousness Alert   Chest Exam   Work Of Breathing / Effort Within Normal Limits   Breath Sounds   RUL Breath Sounds Clear   RML Breath Sounds Clear   RLL Breath Sounds Clear   LEONOR Breath Sounds Clear   LLL Breath Sounds Clear   Oxygen   O2 Delivery Device None - Room Air

## 2023-03-05 PROBLEM — R79.89 AZOTEMIA: Status: ACTIVE | Noted: 2023-03-05

## 2023-03-05 LAB
GLUCOSE BLD STRIP.AUTO-MCNC: 100 MG/DL (ref 65–99)
GLUCOSE BLD STRIP.AUTO-MCNC: 75 MG/DL (ref 65–99)
GLUCOSE BLD STRIP.AUTO-MCNC: 83 MG/DL (ref 65–99)
GLUCOSE BLD STRIP.AUTO-MCNC: 84 MG/DL (ref 65–99)

## 2023-03-05 PROCEDURE — 94760 N-INVAS EAR/PLS OXIMETRY 1: CPT

## 2023-03-05 PROCEDURE — 82962 GLUCOSE BLOOD TEST: CPT | Mod: 91

## 2023-03-05 PROCEDURE — 97130 THER IVNTJ EA ADDL 15 MIN: CPT

## 2023-03-05 PROCEDURE — 99232 SBSQ HOSP IP/OBS MODERATE 35: CPT | Performed by: HOSPITALIST

## 2023-03-05 PROCEDURE — A9270 NON-COVERED ITEM OR SERVICE: HCPCS | Performed by: PHYSICAL MEDICINE & REHABILITATION

## 2023-03-05 PROCEDURE — 94640 AIRWAY INHALATION TREATMENT: CPT

## 2023-03-05 PROCEDURE — 770010 HCHG ROOM/CARE - REHAB SEMI PRIVAT*

## 2023-03-05 PROCEDURE — 700102 HCHG RX REV CODE 250 W/ 637 OVERRIDE(OP): Performed by: PHYSICAL MEDICINE & REHABILITATION

## 2023-03-05 PROCEDURE — 97129 THER IVNTJ 1ST 15 MIN: CPT

## 2023-03-05 PROCEDURE — 700111 HCHG RX REV CODE 636 W/ 250 OVERRIDE (IP): Performed by: PHYSICAL MEDICINE & REHABILITATION

## 2023-03-05 RX ADMIN — PRAZOSIN HYDROCHLORIDE 5 MG: 5 CAPSULE ORAL at 21:42

## 2023-03-05 RX ADMIN — SENNOSIDES AND DOCUSATE SODIUM 2 TABLET: 50; 8.6 TABLET ORAL at 08:33

## 2023-03-05 RX ADMIN — LEVOTHYROXINE SODIUM 25 MCG: 0.03 TABLET ORAL at 06:40

## 2023-03-05 RX ADMIN — LORATADINE 10 MG: 10 TABLET ORAL at 08:34

## 2023-03-05 RX ADMIN — TOPIRAMATE 50 MG: 25 TABLET, FILM COATED ORAL at 21:42

## 2023-03-05 RX ADMIN — LEVETIRACETAM 1000 MG: 500 TABLET, FILM COATED ORAL at 21:42

## 2023-03-05 RX ADMIN — FENOFIBRATE 134 MG: 67 CAPSULE ORAL at 08:33

## 2023-03-05 RX ADMIN — LURASIDONE HYDROCHLORIDE 20 MG: 20 TABLET, FILM COATED ORAL at 17:10

## 2023-03-05 RX ADMIN — LEVETIRACETAM 1000 MG: 500 TABLET, FILM COATED ORAL at 08:33

## 2023-03-05 RX ADMIN — TRAMADOL HYDROCHLORIDE 50 MG: 50 TABLET, COATED ORAL at 08:32

## 2023-03-05 RX ADMIN — SERTRALINE HYDROCHLORIDE 150 MG: 50 TABLET, FILM COATED ORAL at 21:42

## 2023-03-05 RX ADMIN — Medication 4000 UNITS: at 21:41

## 2023-03-05 RX ADMIN — TOPIRAMATE 50 MG: 25 TABLET, FILM COATED ORAL at 08:34

## 2023-03-05 RX ADMIN — BUDESONIDE AND FORMOTEROL FUMARATE DIHYDRATE 2 PUFF: 160; 4.5 AEROSOL RESPIRATORY (INHALATION) at 08:04

## 2023-03-05 RX ADMIN — TRAZODONE HYDROCHLORIDE 100 MG: 100 TABLET ORAL at 21:42

## 2023-03-05 RX ADMIN — MONTELUKAST 10 MG: 10 TABLET, FILM COATED ORAL at 21:43

## 2023-03-05 RX ADMIN — DORZOLAMIDE HYDROCHLORIDE AND TIMOLOL MALEATE 1 DROP: 20; 5 SOLUTION/ DROPS OPHTHALMIC at 21:47

## 2023-03-05 RX ADMIN — DIVALPROEX SODIUM 1500 MG: 500 TABLET, DELAYED RELEASE ORAL at 21:42

## 2023-03-05 RX ADMIN — LEVOTHYROXINE SODIUM 200 MCG: 0.12 TABLET ORAL at 06:39

## 2023-03-05 RX ADMIN — DORZOLAMIDE HYDROCHLORIDE AND TIMOLOL MALEATE 1 DROP: 20; 5 SOLUTION/ DROPS OPHTHALMIC at 08:37

## 2023-03-05 RX ADMIN — DIVALPROEX SODIUM 500 MG: 500 TABLET, DELAYED RELEASE ORAL at 08:34

## 2023-03-05 RX ADMIN — ATORVASTATIN CALCIUM 20 MG: 10 TABLET, FILM COATED ORAL at 21:41

## 2023-03-05 RX ADMIN — OMEPRAZOLE 20 MG: 20 CAPSULE, DELAYED RELEASE ORAL at 08:34

## 2023-03-05 RX ADMIN — ENOXAPARIN SODIUM 40 MG: 40 INJECTION SUBCUTANEOUS at 08:31

## 2023-03-05 RX ADMIN — BUDESONIDE AND FORMOTEROL FUMARATE DIHYDRATE 2 PUFF: 160; 4.5 AEROSOL RESPIRATORY (INHALATION) at 21:43

## 2023-03-05 ASSESSMENT — ENCOUNTER SYMPTOMS
PALPITATIONS: 0
BRUISES/BLEEDS EASILY: 0
VOMITING: 0
FEVER: 0
SHORTNESS OF BREATH: 0
EYES NEGATIVE: 1
NAUSEA: 0
POLYDIPSIA: 0
COUGH: 0
DEPRESSION: 1
MUSCULOSKELETAL NEGATIVE: 1
ABDOMINAL PAIN: 0
NERVOUS/ANXIOUS: 1
CHILLS: 0

## 2023-03-05 ASSESSMENT — PAIN DESCRIPTION - PAIN TYPE
TYPE: ACUTE PAIN
TYPE: ACUTE PAIN

## 2023-03-05 NOTE — THERAPY
Speech Language Pathology  Daily Treatment     Patient Name: Norm Prabhakar  Age:  42 y.o., Sex:  male  Medical Record #: [id number]  Today's Date: 3/5/2023     Precautions  Precautions: Fall Risk  Comments: seizure, R hemiparesis; possible conversion disorder    Subjective    Pt pleasant and cooperative during tx.  Pt requested tx at bedside secondary to back spasms.       Objective       03/05/23 0731   Treatment Charges   SLP Cognitive Skill Development First 15 Minutes 1   SLP Cognitive Skill Development Additional 15 Minutes 1   SLP Total Time Spent   SLP Individual Total Time Spent (Mins) 30   Cognition   Functional Memory Activities Minimal (4)   Functional Problem Solving Minimal (4)         Assessment    Pt recalled yesterday's events given min verbal cues.  Pt recalled meal items, and therapy tasks.  Pt unable to recall names of therapists.  Functional problem solving targeted through discharge planning.  Pt stated that his mom will drive, pay bills, and manage his medications.  Pt aware of inability to drive at this time secondary to seizures.      Strengths: Able to follow instructions, Alert and oriented, Supportive family, Willingly participates in therapeutic activities  Barriers: Impaired carryover of learning, Impaired insight/denial of deficits, Impaired functional cognition    Plan    Memory log, functional problem solving.       Speech Therapy Problems (Active)       Problem: Memory STGs       Dates: Start: 02/28/23         Goal: STG-Within one week, patient will recall new training, daily events with 75% acc with min A.       Dates: Start: 02/28/23   Expected End: 03/11/23         Goal Note filed on 03/02/23 1314 by Lulu Leija MS,CCC-SLP       Min to mod cues for recall of new training, with low attention to new training.              Goal: STG-Within one week, patient will       Dates: Start: 02/28/23   Expected End: 03/11/23               Problem: Speech/Swallowing LTGs       Dates: Start:  02/28/23         Goal: LTG-By discharge, patient will solve basic problems and recall safety training with 80% acc with min A.       Dates: Start: 02/28/23   Expected End: 03/11/23

## 2023-03-05 NOTE — FLOWSHEET NOTE
03/05/23 0805   Events/Summary/Plan   Events/Summary/Plan o2 spot check   Vital Signs   Pulse (!) 58   Respiration 16   Pulse Oximetry 94 %   $ Pulse Oximetry (Spot Check) Yes   Respiratory Assessment   Level of Consciousness Alert   Chest Exam   Work Of Breathing / Effort Within Normal Limits   Breath Sounds   RUL Breath Sounds Clear   RML Breath Sounds Clear   RLL Breath Sounds Clear   LEONOR Breath Sounds Clear   LLL Breath Sounds Clear   Oxygen   O2 Delivery Device None - Room Air

## 2023-03-05 NOTE — CARE PLAN
Problem: Knowledge Deficit - Standard  Goal: Patient and family/care givers will demonstrate understanding of plan of care, disease process/condition, diagnostic tests and medications  Outcome: Progressing     Problem: Fall Risk - Rehab  Goal: Patient will remain free from falls  Outcome: Progressing     Problem: Pain - Standard  Goal: Alleviation of pain or a reduction in pain to the patient’s comfort goal  Outcome: Progressing     The patient is Stable - Low risk of patient condition declining or worsening    Shift Goals  Clinical Goals: Safety  Patient Goals: Sleep comfortably    Progress made toward(s) clinical / shift goals:  Progressing    Patient is not progressing towards the following goals:

## 2023-03-05 NOTE — CARE PLAN
"  Problem: Knowledge Deficit - Standard  Goal: Patient and family/care givers will demonstrate understanding of plan of care, disease process/condition, diagnostic tests and medications  Outcome: Progressing     Problem: Fall Risk - Rehab  Goal: Patient will remain free from falls  Outcome: Progressing  Note: Mariluz Ball Fall risk Assessment Score: 12    Moderate fall risk Interventions  - Bed and strip alarm   - Yellow sign by the door   - Yellow wrist band \"Fall risk\"  - Room near to the nurse station  - Do not leave patient unattended in the bathroom  - Fall risk education provided     "

## 2023-03-06 ENCOUNTER — APPOINTMENT (OUTPATIENT)
Dept: PHYSICAL THERAPY | Facility: REHABILITATION | Age: 43
DRG: 101 | End: 2023-03-06
Attending: PHYSICAL MEDICINE & REHABILITATION
Payer: MEDICAID

## 2023-03-06 ENCOUNTER — APPOINTMENT (OUTPATIENT)
Dept: OCCUPATIONAL THERAPY | Facility: REHABILITATION | Age: 43
DRG: 101 | End: 2023-03-06
Attending: PHYSICAL MEDICINE & REHABILITATION
Payer: MEDICAID

## 2023-03-06 ENCOUNTER — APPOINTMENT (OUTPATIENT)
Dept: SPEECH THERAPY | Facility: REHABILITATION | Age: 43
DRG: 101 | End: 2023-03-06
Attending: PHYSICAL MEDICINE & REHABILITATION
Payer: MEDICAID

## 2023-03-06 PROBLEM — E03.9 HYPOTHYROIDISM: Status: ACTIVE | Noted: 2019-08-02

## 2023-03-06 LAB
GLUCOSE BLD STRIP.AUTO-MCNC: 113 MG/DL (ref 65–99)
GLUCOSE BLD STRIP.AUTO-MCNC: 230 MG/DL (ref 65–99)
GLUCOSE BLD STRIP.AUTO-MCNC: 245 MG/DL (ref 65–99)
GLUCOSE BLD STRIP.AUTO-MCNC: 68 MG/DL (ref 65–99)
GLUCOSE BLD STRIP.AUTO-MCNC: 95 MG/DL (ref 65–99)
GLUCOSE BLD STRIP.AUTO-MCNC: 99 MG/DL (ref 65–99)

## 2023-03-06 PROCEDURE — 99232 SBSQ HOSP IP/OBS MODERATE 35: CPT | Performed by: HOSPITALIST

## 2023-03-06 PROCEDURE — 82962 GLUCOSE BLOOD TEST: CPT

## 2023-03-06 PROCEDURE — 97530 THERAPEUTIC ACTIVITIES: CPT

## 2023-03-06 PROCEDURE — 97130 THER IVNTJ EA ADDL 15 MIN: CPT

## 2023-03-06 PROCEDURE — 97535 SELF CARE MNGMENT TRAINING: CPT

## 2023-03-06 PROCEDURE — 94640 AIRWAY INHALATION TREATMENT: CPT

## 2023-03-06 PROCEDURE — 90832 PSYTX W PT 30 MINUTES: CPT | Mod: MISDOCU | Performed by: PSYCHOLOGIST

## 2023-03-06 PROCEDURE — A9270 NON-COVERED ITEM OR SERVICE: HCPCS | Performed by: PHYSICAL MEDICINE & REHABILITATION

## 2023-03-06 PROCEDURE — 99232 SBSQ HOSP IP/OBS MODERATE 35: CPT | Performed by: PHYSICAL MEDICINE & REHABILITATION

## 2023-03-06 PROCEDURE — 700102 HCHG RX REV CODE 250 W/ 637 OVERRIDE(OP): Performed by: PHYSICAL MEDICINE & REHABILITATION

## 2023-03-06 PROCEDURE — 700111 HCHG RX REV CODE 636 W/ 250 OVERRIDE (IP): Performed by: PHYSICAL MEDICINE & REHABILITATION

## 2023-03-06 PROCEDURE — 94760 N-INVAS EAR/PLS OXIMETRY 1: CPT

## 2023-03-06 PROCEDURE — 97129 THER IVNTJ 1ST 15 MIN: CPT

## 2023-03-06 PROCEDURE — 97110 THERAPEUTIC EXERCISES: CPT

## 2023-03-06 PROCEDURE — 770010 HCHG ROOM/CARE - REHAB SEMI PRIVAT*

## 2023-03-06 PROCEDURE — 97116 GAIT TRAINING THERAPY: CPT

## 2023-03-06 RX ADMIN — SENNOSIDES AND DOCUSATE SODIUM 2 TABLET: 50; 8.6 TABLET ORAL at 08:11

## 2023-03-06 RX ADMIN — MONTELUKAST 10 MG: 10 TABLET, FILM COATED ORAL at 20:21

## 2023-03-06 RX ADMIN — TOPIRAMATE 50 MG: 25 TABLET, FILM COATED ORAL at 08:11

## 2023-03-06 RX ADMIN — LORATADINE 10 MG: 10 TABLET ORAL at 08:11

## 2023-03-06 RX ADMIN — Medication 4000 UNITS: at 20:28

## 2023-03-06 RX ADMIN — ATORVASTATIN CALCIUM 20 MG: 10 TABLET, FILM COATED ORAL at 20:20

## 2023-03-06 RX ADMIN — DORZOLAMIDE HYDROCHLORIDE AND TIMOLOL MALEATE 1 DROP: 20; 5 SOLUTION/ DROPS OPHTHALMIC at 08:12

## 2023-03-06 RX ADMIN — PRAZOSIN HYDROCHLORIDE 5 MG: 5 CAPSULE ORAL at 20:21

## 2023-03-06 RX ADMIN — LEVETIRACETAM 1000 MG: 500 TABLET, FILM COATED ORAL at 20:20

## 2023-03-06 RX ADMIN — ENOXAPARIN SODIUM 40 MG: 40 INJECTION SUBCUTANEOUS at 08:11

## 2023-03-06 RX ADMIN — TOPIRAMATE 50 MG: 25 TABLET, FILM COATED ORAL at 20:20

## 2023-03-06 RX ADMIN — DIVALPROEX SODIUM 500 MG: 500 TABLET, DELAYED RELEASE ORAL at 08:11

## 2023-03-06 RX ADMIN — DORZOLAMIDE HYDROCHLORIDE AND TIMOLOL MALEATE 1 DROP: 20; 5 SOLUTION/ DROPS OPHTHALMIC at 20:18

## 2023-03-06 RX ADMIN — FENOFIBRATE 134 MG: 67 CAPSULE ORAL at 08:11

## 2023-03-06 RX ADMIN — BUDESONIDE AND FORMOTEROL FUMARATE DIHYDRATE 2 PUFF: 160; 4.5 AEROSOL RESPIRATORY (INHALATION) at 12:58

## 2023-03-06 RX ADMIN — DIVALPROEX SODIUM 1500 MG: 500 TABLET, DELAYED RELEASE ORAL at 20:21

## 2023-03-06 RX ADMIN — LEVETIRACETAM 1000 MG: 500 TABLET, FILM COATED ORAL at 08:11

## 2023-03-06 RX ADMIN — LURASIDONE HYDROCHLORIDE 20 MG: 20 TABLET, FILM COATED ORAL at 18:00

## 2023-03-06 RX ADMIN — SERTRALINE HYDROCHLORIDE 150 MG: 50 TABLET, FILM COATED ORAL at 20:20

## 2023-03-06 RX ADMIN — LEVOTHYROXINE SODIUM 25 MCG: 0.03 TABLET ORAL at 05:28

## 2023-03-06 RX ADMIN — BUDESONIDE AND FORMOTEROL FUMARATE DIHYDRATE 2 PUFF: 160; 4.5 AEROSOL RESPIRATORY (INHALATION) at 20:18

## 2023-03-06 RX ADMIN — TRAZODONE HYDROCHLORIDE 100 MG: 100 TABLET ORAL at 20:20

## 2023-03-06 RX ADMIN — LEVOTHYROXINE SODIUM 200 MCG: 0.12 TABLET ORAL at 05:28

## 2023-03-06 RX ADMIN — OMEPRAZOLE 20 MG: 20 CAPSULE, DELAYED RELEASE ORAL at 08:11

## 2023-03-06 ASSESSMENT — ACTIVITIES OF DAILY LIVING (ADL)
BED_CHAIR_WHEELCHAIR_TRANSFER_DESCRIPTION: ADAPTIVE EQUIPMENT;INCREASED TIME;SET-UP OF EQUIPMENT;SUPERVISION FOR SAFETY;VERBAL CUEING
BED_CHAIR_WHEELCHAIR_TRANSFER_DESCRIPTION: ADAPTIVE EQUIPMENT;INCREASED TIME;SET-UP OF EQUIPMENT;SUPERVISION FOR SAFETY;VERBAL CUEING
TUB_SHOWER_TRANSFER_DESCRIPTION: GRAB BAR;SHOWER BENCH;SET-UP OF EQUIPMENT;SUPERVISION FOR SAFETY
BED_CHAIR_WHEELCHAIR_TRANSFER_DESCRIPTION: SQUAT PIVOT TRANSFER TO WHEELCHAIR;SET-UP OF EQUIPMENT;SUPERVISION FOR SAFETY

## 2023-03-06 ASSESSMENT — ENCOUNTER SYMPTOMS
NAUSEA: 0
EYES NEGATIVE: 1
PALPITATIONS: 0
COUGH: 0
VOMITING: 0
SHORTNESS OF BREATH: 0
CHILLS: 0
POLYDIPSIA: 0
BRUISES/BLEEDS EASILY: 0
ABDOMINAL PAIN: 0
MUSCULOSKELETAL NEGATIVE: 1
FEVER: 0
DEPRESSION: 1
NERVOUS/ANXIOUS: 1

## 2023-03-06 ASSESSMENT — GAIT ASSESSMENTS
GAIT LEVEL OF ASSIST: CONTACT GUARD ASSIST
ASSISTIVE DEVICE: FRONT WHEEL WALKER
DISTANCE (FEET): 20
ASSISTIVE DEVICE: FRONT WHEEL WALKER
DISTANCE (FEET): 70
DEVIATION: BRADYKINETIC;SHUFFLED GAIT;DECREASED HEEL STRIKE;DECREASED TOE OFF
DEVIATION: DECREASED BASE OF SUPPORT;BRADYKINETIC;SHUFFLED GAIT;DECREASED HEEL STRIKE;DECREASED TOE OFF
GAIT LEVEL OF ASSIST: CONTACT GUARD ASSIST

## 2023-03-06 NOTE — PROGRESS NOTES
Hypoglycemia Intervention    Hypoglycemia protocol intervention:  Blood glucose 68 at 0733.  Intervention: 8 oz of fruit juice   Breakfast tray given early    Repeat blood glucose 95 at 0800.  Intervention: 4 oz of fruit juice     113 @ 0818    Dr. Echavarria and CN notified of the above at 0740.

## 2023-03-06 NOTE — PROGRESS NOTES
Hospital Medicine Daily Progress Note        Chief Complaint  Hypertension  Diabetes    Interval Problem Update  Blood sugars continue to be low.  Labs reviewed.    Review of Systems  Review of Systems   Constitutional:  Negative for chills and fever.   HENT: Negative.     Eyes: Negative.    Respiratory:  Negative for cough and shortness of breath.    Cardiovascular:  Negative for chest pain and palpitations.   Gastrointestinal:  Negative for abdominal pain, nausea and vomiting.   Musculoskeletal: Negative.    Skin:  Negative for itching and rash.   Endo/Heme/Allergies:  Negative for polydipsia. Does not bruise/bleed easily.   Psychiatric/Behavioral:  Positive for depression. The patient is nervous/anxious.       Physical Exam  Temp:  [36.2 °C (97.1 °F)-36.7 °C (98 °F)] 36.6 °C (97.8 °F)  Pulse:  [51-74] 74  Resp:  [16-18] 18  BP: (101-107)/(55-68) 107/65  SpO2:  [94 %-97 %] 94 %    Physical Exam  Vitals reviewed.   Constitutional:       General: He is not in acute distress.     Appearance: Normal appearance. He is not ill-appearing.   HENT:      Head: Normocephalic and atraumatic.      Right Ear: External ear normal.      Left Ear: External ear normal.      Nose: Nose normal.      Mouth/Throat:      Pharynx: Oropharynx is clear.   Eyes:      General:         Right eye: No discharge.         Left eye: No discharge.      Extraocular Movements: Extraocular movements intact.      Conjunctiva/sclera: Conjunctivae normal.   Cardiovascular:      Rate and Rhythm: Normal rate and regular rhythm.   Pulmonary:      Effort: Pulmonary effort is normal. No respiratory distress.      Breath sounds: Normal breath sounds. No wheezing.   Abdominal:      General: Bowel sounds are normal. There is no distension.      Palpations: Abdomen is soft.      Tenderness: There is no abdominal tenderness.   Musculoskeletal:      Cervical back: Normal range of motion and neck supple.      Right lower leg: No edema.      Left lower leg: No edema.    Skin:     General: Skin is warm and dry.   Neurological:      Mental Status: He is alert and oriented to person, place, and time.       Fluids    Intake/Output Summary (Last 24 hours) at 3/6/2023 1546  Last data filed at 3/6/2023 1500  Gross per 24 hour   Intake 360 ml   Output 2900 ml   Net -2540 ml       Laboratory  Recent Labs     03/04/23  0528   WBC 7.1   RBC 4.32*   HEMOGLOBIN 13.0*   HEMATOCRIT 39.4*   MCV 91.2   MCH 30.1   MCHC 33.0*   RDW 41.4   PLATELETCT 238   MPV 11.1     Recent Labs     03/04/23  0528   SODIUM 139   POTASSIUM 3.9   CHLORIDE 102   CO2 25   GLUCOSE 73   BUN 23*   CREATININE 0.83   CALCIUM 8.9                   Assessment/Plan  * Seizure disorder (HCC)- (present on admission)  Assessment & Plan  On Depakote and Keppra    Azotemia  Assessment & Plan  Continue to encourage PO fluids  Follow renal function    Hx of thyroidectomy- (present on admission)  Assessment & Plan  TSH 7.55 and FT4 1.16  Already on high dose Synthroid  Needs outpt F/U    Primary hypertension- (present on admission)  Assessment & Plan  Serially decreased Lisinopril for low blood pressures  Watch for orthostatic hypotension on Prazosin    Mixed hyperlipidemia- (present on admission)  Assessment & Plan  On Lipitor and Fenofibrate    Type 2 diabetes mellitus without complication, without long-term current use of insulin (HCC)- (present on admission)  Assessment & Plan  HbA1c 7.8  On Metformin, Lantus bid, and SSI  Will slowly taper off PO meds to minimize polypharmacy  Discontinued Farxiga and Januvia  Will stop Metformin as well  Decrease Lantus for hypoglycemic trends    Psychiatric problem- (present on admission)  Assessment & Plan  On Zoloft,Topamax, Latuda, and Prazosin    Glaucoma- (present on admission)  Assessment & Plan  On Cosopt    Asthma- (present on admission)  Assessment & Plan  On Symbicort and Singulair  RT protocol       Full Code

## 2023-03-06 NOTE — PROGRESS NOTES
"  Physical Medicine & Rehabilitation Progress Note    Encounter Date: 3/6/2023    Chief Complaint: Decreased mobility, right sided weakness    Interval Events (Subjective):  Patient sitting up in room. He reports therapy is going well. He has questions about his biopsy. Reviewed CARE Everywhere and appears to only show normal thyroid tissue. His blood sugars have remain stable. Insulin glargine adjusted to 20 U BID per hospitalist    _____________________________________  Interdisciplinary Team Conference   Most recent IDT on 3/2/2023    Discharge Date/Disposition:  3/11/23  _____________________________________    Objective:  VITAL SIGNS: /55   Pulse (!) 55   Temp 36.2 °C (97.1 °F)   Resp 16   Ht 1.981 m (6' 6\")   Wt 109 kg (240 lb)   SpO2 96%   BMI 27.73 kg/m²   Gen: NAD  Psych: Mood and affect appropriate  CV: RRR, 0 edema  Resp: CTAB, no upper airway sounds  Abd: NTND  Neuro: AOx4, following commands    Laboratory Values:  Recent Results (from the past 72 hour(s))   POCT glucose device results    Collection Time: 03/03/23  5:24 PM   Result Value Ref Range    POC Glucose, Blood 114 (H) 65 - 99 mg/dL   POCT glucose device results    Collection Time: 03/03/23  9:46 PM   Result Value Ref Range    POC Glucose, Blood 99 65 - 99 mg/dL   CBC WITHOUT DIFFERENTIAL    Collection Time: 03/04/23  5:28 AM   Result Value Ref Range    WBC 7.1 4.8 - 10.8 K/uL    RBC 4.32 (L) 4.70 - 6.10 M/uL    Hemoglobin 13.0 (L) 14.0 - 18.0 g/dL    Hematocrit 39.4 (L) 42.0 - 52.0 %    MCV 91.2 81.4 - 97.8 fL    MCH 30.1 27.0 - 33.0 pg    MCHC 33.0 (L) 33.7 - 35.3 g/dL    RDW 41.4 35.9 - 50.0 fL    Platelet Count 238 164 - 446 K/uL    MPV 11.1 9.0 - 12.9 fL   Basic Metabolic Panel    Collection Time: 03/04/23  5:28 AM   Result Value Ref Range    Sodium 139 135 - 145 mmol/L    Potassium 3.9 3.6 - 5.5 mmol/L    Chloride 102 96 - 112 mmol/L    Co2 25 20 - 33 mmol/L    Glucose 73 65 - 99 mg/dL    Bun 23 (H) 8 - 22 mg/dL    Creatinine " 0.83 0.50 - 1.40 mg/dL    Calcium 8.9 8.5 - 10.5 mg/dL    Anion Gap 12.0 7.0 - 16.0   ESTIMATED GFR    Collection Time: 03/04/23  5:28 AM   Result Value Ref Range    GFR (CKD-EPI) 113 >60 mL/min/1.73 m 2   POCT glucose device results    Collection Time: 03/04/23  7:35 AM   Result Value Ref Range    POC Glucose, Blood 83 65 - 99 mg/dL   POCT glucose device results    Collection Time: 03/04/23 11:23 AM   Result Value Ref Range    POC Glucose, Blood 107 (H) 65 - 99 mg/dL   POCT glucose device results    Collection Time: 03/04/23  5:13 PM   Result Value Ref Range    POC Glucose, Blood 94 65 - 99 mg/dL   POCT glucose device results    Collection Time: 03/04/23  9:50 PM   Result Value Ref Range    POC Glucose, Blood 91 65 - 99 mg/dL   POCT glucose device results    Collection Time: 03/05/23  7:39 AM   Result Value Ref Range    POC Glucose, Blood 75 65 - 99 mg/dL   POCT glucose device results    Collection Time: 03/05/23 11:15 AM   Result Value Ref Range    POC Glucose, Blood 100 (H) 65 - 99 mg/dL   POCT glucose device results    Collection Time: 03/05/23  5:09 PM   Result Value Ref Range    POC Glucose, Blood 84 65 - 99 mg/dL   POCT glucose device results    Collection Time: 03/05/23  9:40 PM   Result Value Ref Range    POC Glucose, Blood 83 65 - 99 mg/dL   POCT glucose device results    Collection Time: 03/06/23  7:32 AM   Result Value Ref Range    POC Glucose, Blood 68 65 - 99 mg/dL   POCT glucose device results    Collection Time: 03/06/23  8:02 AM   Result Value Ref Range    POC Glucose, Blood 95 65 - 99 mg/dL   POCT glucose device results    Collection Time: 03/06/23  8:18 AM   Result Value Ref Range    POC Glucose, Blood 113 (H) 65 - 99 mg/dL   POCT glucose device results    Collection Time: 03/06/23 11:03 AM   Result Value Ref Range    POC Glucose, Blood 99 65 - 99 mg/dL       Medications:  Scheduled Medications   Medication Dose Frequency    insulin GLARGINE  20 Units BID    lurasidone  20 mg PM MEAL    insulin  regular  2-12 Units 4X/DAY ACHS    senna-docusate  2 Tablet BID    omeprazole  20 mg DAILY    atorvastatin  20 mg Q EVENING    levothyroxine  200 mcg AM ES    levothyroxine  25 mcg AM ES    vitamin D3  4,000 Units Q EVENING    divalproex  1,500 mg QHS    fenofibrate micronized  134 mg DAILY    montelukast  10 mg Q EVENING    prazosin  5 mg Q EVENING    sertraline  150 mg QHS    levETIRAcetam  1,000 mg Q12HRS    divalproex  500 mg DAILY    loratadine  10 mg DAILY    topiramate  50 mg Q12HRS    traZODone  100 mg QHS    dorzolamide-timolol  1 Drop BID    budesonide-formoterol  2 Puff BID (RT)    enoxaparin (LOVENOX) injection  40 mg DAILY     PRN medications: insulin regular **AND** POC blood glucose manual result **AND** NOTIFY MD and PharmD **AND** Administer 20 grams of glucose (approximately 8 ounces of fruit juice) every 15 minutes PRN FSBG less than 70 mg/dL **AND** dextrose bolus, Respiratory Therapy Consult, hydrALAZINE, acetaminophen, senna-docusate **AND** polyethylene glycol/lytes **AND** magnesium hydroxide **AND** bisacodyl, mag hydrox-al hydrox-simeth, ondansetron **OR** ondansetron, traZODone, sodium chloride, traMADol, midazolam, albuterol    Diet:  Current Diet Order   Procedures    Diet Order Diet: Consistent CHO (Diabetic)       Assessment:  Active Hospital Problems    Diagnosis     *Seizure disorder (HCC)     Acute right-sided weakness     Primary hypertension     Mixed hyperlipidemia     Type 2 diabetes mellitus without complication, without long-term current use of insulin (HCC)     PTSD (post-traumatic stress disorder)     Neck mass     Postoperative hypothyroidism     Anxiety and depression        Medical Decision Making and Plan:  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  -PT and OT for mobility and ADLs. Per guidelines, 15 hours per week between PT, OT and  SLP.  -Follow-up APAP/Tramadol  -Continue Keppra and Depakote     HTN - Patient on Lisinopril 10 mg. Lisinopril discontinue for low SBP     DM2 with hyperglycemia - Patient on Lantus and SSI as well as metformin, Januvia. Blood sugars elevated, consult hospitalist. A1c 7.8. Ongoing hyperglycemia. Continue Lantus and SSI  -Lantus adjusted to 20 U BID     HLD - Patient on Atorvastatin 20 mg QHS and Lofibra     Glaucoma - Patient on Cosopt      BPD/Mood disorder - Patient on Depakote 500/1500, Prazosin 5 mg daily, Sertraline 150 mg, Topamax 50 mg BID, Latuda 80 mg QHS and Trazodone 100 mg   -Consult Neuropsychology. Has history of suicide attempts per chart reviewed. Discussed case with Dr. Cervantes . Home dose is Latuda 20 mg, will reduce    Anemia - 13.9 on admission, repeat 3/2 - 13.1    Azotemia - BUN 28 on 3/2/23. Encourage PO fluids for now. If no improvement will need IVF    Hypothyroidism - Patient on 225 mcg of Levothyroxine. TSH elevated but T4 normal.      Asthma - Patient on Singulair, Claritin, and Symbicort     Pain - APAP/Tramadol PRN     Skin - Patient at risk for skin breakdown due to debility in areas including sacrum, achilles, elbows and head in addition to other sites. Nursing to assess skin daily.     GI Ppx - Patient on Prilosec for GERD prophylaxis. Patient on Senna-docusate for constipation prophylaxis.      DVT Ppx - Patient Lovenox on transfer.  ____________________________________    T. Benjamin Smith MD./PhD.  [facility] - Physical Medicine & Rehabilitation   [facility] - Brain Injury Medicine   ____________________________________  \

## 2023-03-06 NOTE — ASSESSMENT & PLAN NOTE
Hx thyroidectomy  TSH: 7.55  FT4: 1.16  ? Subclinical   On Synthroid  Needs repeat TFT's as an outpatient

## 2023-03-06 NOTE — THERAPY
"Physical Therapy   Daily Treatment     Patient Name: Norm Prabhakar  Age:  42 y.o., Sex:  male  Medical Record #: [id number]  Today's Date: 3/6/2023     Precautions  Precautions: Fall Risk  Comments: seizure, R hemiparesis; possible conversion disorder    Subjective    Pt received resting in bed with [employer] soda, pizza, and wings from lunch. Agreeable to participate.     Objective       03/06/23 1331   PT Charge Group   PT Therapeutic Exercise (Units) 2   PT Therapeutic Activities (Units) 2   PT Total Time Spent   PT Individual Total Time Spent (Mins) 60   Cognition    Attention Impaired   Gait Functional Level of Assist    Gait Level Of Assist Contact Guard Assist   Assistive Device Front Wheel Walker   Distance (Feet) 20   # of Times Distance was Traveled 2  (wc<>shuttle)   Deviation Bradykinetic;Shuffled Gait;Decreased Heel Strike;Decreased Toe Off   Stairs Functional Level of Assist   Level of Assist with Stairs Contact Guard Assist   # of Stairs Climbed 18  (on 4\" stairs with 1 std RB)   Stairs Description Extra time;Hand rails;Limited by fatigue;Safety concerns;Supervision for safety;Verbal cueing  (step to asc/desc)   Transfer Functional Level of Assist   Bed, Chair, Wheelchair Transfer Contact Guard Assist   Bed Chair Wheelchair Transfer Description Adaptive equipment;Increased time;Set-up of equipment;Supervision for safety;Verbal cueing  (stand step FWW)   Supine Lower Body Exercise   Other Exercises shuttle DL 5>>6 bands, LLE 5 bands, RLE 3 bands 3x15 ea   Bed Mobility    Supine to Sit Modified Independent   Sit to Stand Standby Assist   Scooting Modified Independent  (in wc)   Interdisciplinary Plan of Care Collaboration   Patient Position at End of Therapy Seated;Chair Alarm On;Self Releasing Lap Belt Applied;Call Light within Reach;Tray Table within Reach;Phone within Reach         Assessment    Pt with good tolerance for session focused on stair training and LE strengthening. Cont to build activity " tolerance as able.    Strengths: Able to follow instructions, Alert and oriented, Effective communication skills, Independent prior level of function, Motivated for self care and independence, Pleasant and cooperative, Willingly participates in therapeutic activities  Barriers: Decreased endurance, Fatigue, Hemiparesis, Impaired activity tolerance, Impaired balance, Impaired functional cognition, Limited mobility (hx of TBI and CVA)    Plan    Progressive gait with FWW, standing tolerance, stairs/curb navigation, LE strengthening and endurance. Consider TENS during tx if back pain is limiting activity     Passport items to be completed:  Get in/out of bed safely, in/out of a vehicle, safely use mobility device, walk or wheel around home/community, navigate up and down stairs, show how to get up/down from the ground, ensure home is accessible, demonstrate HEP, complete caregiver training    Physical Therapy Problems (Active)       Problem: Mobility       Dates: Start: 02/28/23         Goal: STG-Within one week, patient will ambulate household distance 25 ft x2 with FWW and CGA.       Dates: Start: 02/28/23   Expected End: 03/11/23         Goal Note filed on 03/02/23 1342 by Elke Hernandez, PT       20 ft x2 with min A and wc follow              Goal: STG-Within one week, patient will ambulate up/down a curb with FWW and min A.       Dates: Start: 02/28/23   Expected End: 03/11/23         Goal Note filed on 03/02/23 1342 by Elke Hernandez, PT       Not yet assessed d/t safety concerns              Goal: STG-Within one week, patient will ambulate up/down flight of stairs with min A using BHR and RB prn.       Dates: Start: 02/28/23   Expected End: 03/11/23         Goal Note filed on 03/02/23 1342 by Elke Hernandez PT       Not yet assessed d/t safety concerns                 Problem: Mobility Transfers       Dates: Start: 02/28/23         Goal: STG-Within one week, patient will transfer bed to chair with FWW and SBA.        Dates: Start: 02/28/23   Expected End: 03/11/23         Goal Note filed on 03/02/23 1342 by Elke Hernandez, PT       CGA                 Problem: PT-Long Term Goals       Dates: Start: 02/28/23         Goal: LTG-By discharge, patient will ambulate household distances at least 150 ft with FWW vs LRAD and SPV.       Dates: Start: 02/28/23   Expected End: 03/11/23            Goal: LTG-By discharge, patient will transfer one surface to another with FWW vs LRAD and SPV.       Dates: Start: 02/28/23   Expected End: 03/11/23            Goal: LTG-By discharge, patient will ambulate up/down flight of stairs with BHR and SPV requiring no RB.       Dates: Start: 02/28/23   Expected End: 03/11/23            Goal: LTG-By discharge, patient will transfer in/out of a car with FWW vs LRAD and set up A.       Dates: Start: 02/28/23   Expected End: 03/11/23

## 2023-03-06 NOTE — THERAPY
"Occupational Therapy  Daily Treatment     Patient Name: Norm Prabhakar  Age:  42 y.o., Sex:  male  Medical Record #: [id number]  Today's Date: 3/6/2023     Precautions  Precautions: Fall Risk  Comments: seizure, R hemiparesis; possible conversion disorder         Subjective    Patient was in bed upon arrival. Patient was agreeable to OT session and to shower. \" The meds make me drowsy.\" \"I don't sleep well at night. I have nightmares.\" Patient states that this is d/t childhood trauma.      Objective       03/06/23 0831   OT Charge Group   OT Self Care / ADL (Units) 3   OT Therapy Activity (Units) 1   OT Total Time Spent   OT Individual Total Time Spent (Mins) 60   Cosignature   Documentation Review Approved as originally documented and filed.   Precautions   Precautions Fall Risk   Comments seizure, R hemiparesis; possible conversion disorder   Pain   Intervention Declines;Rest  (declined warm water on back at end of shower)   Pain 0 - 10 Group   Location Back   Non Verbal Descriptors   Non Verbal Scale  Calm   Functional Level of Assist   Grooming Modified Independent   Grooming Description Seated in wheelchair at sink   Bathing Standby Assist   Bathing Description Grab bar;Tub bench;Hand held shower;Set-up of equipment;Supervision for safety   Upper Body Dressing Modified Independent   Upper Body Dressing Description   (doff/donned same shirt)   Lower Body Dressing Standby Assist   Lower Body Dressing Description Grab bar;Supervision for safety   Bed, Chair, Wheelchair Transfer Contact Guard Assist   Bed Chair Wheelchair Transfer Description Squat pivot transfer to wheelchair;Set-up of equipment;Supervision for safety   Tub / Shower Transfers Standby Assist   Tub Shower Transfer Description Grab bar;Shower bench;Set-up of equipment;Supervision for safety   IADL Treatments   IADL Treatments Kitchen mobility education   Kitchen Mobility Education kitchen mobility to collect 4/10 cones in various cabinets/appliances " with CGA and one UE support on counter, patient collected 4/10 cones d/t fatigue in B LE   Bed Mobility    Supine to Sit Modified Independent   Scooting Supervised  (EOB and w/c)   Interdisciplinary Plan of Care Collaboration   Patient Position at End of Therapy Seated;Chair Alarm On;Self Releasing Lap Belt Applied;Phone within Reach;Other (Comments)  (in w/c in main therapy gym for PT session)         Assessment    Patient was pleasant throughout session. Patient did well with ADL routine. Patient was mod I for UB dressing and SBA for LB dressing. Patient became fatigued during kitchen mobility and was only able to tolerate standing to collect 4/10 cones.    Strengths: Able to follow instructions, Effective communication skills, Motivated for self care and independence, Manages pain appropriately, Pleasant and cooperative, Supportive family, Willingly participates in therapeutic activities, Alert and oriented, Independent prior level of function  Barriers: Decreased endurance, Hemiparesis, Impaired activity tolerance, Impaired balance, Limited mobility    Plan    ADLs, IADLs, transfers, functional mobility, R UE AROM/strengthening, standing tolerance/balance    Occupational Therapy Goals (Active)       Problem: Bathing       Dates: Start: 02/28/23         Goal: STG-Within one week, patient will bathe with SBA using grab bars as needed.        Dates: Start: 02/28/23   Expected End: 03/11/23         Goal Note filed on 03/02/23 1346 by Juan Preston OT/L       CGA                 Problem: Dressing       Dates: Start: 03/02/23         Goal: STG-Within one week, patient will dress LB with setup and supervision       Dates: Start: 03/02/23   Expected End: 03/11/23               Problem: Functional Transfers       Dates: Start: 03/02/23         Goal: STG-Within one week, patient will transfer to toilet with setup and supervision       Dates: Start: 03/02/23   Expected End: 03/11/23               Problem: OT Long Term  Goals       Dates: Start: 02/28/23         Goal: LTG-By discharge, patient will complete basic self care tasks with supervision.       Dates: Start: 02/28/23   Expected End: 03/11/23            Goal: LTG-By discharge, patient will perform bathroom transfers with supervision.       Dates: Start: 02/28/23   Expected End: 03/11/23            Goal: LTG-By discharge, patient will complete basic home management with supervision.        Dates: Start: 02/28/23   Expected End: 03/11/23               Problem: Toileting       Dates: Start: 03/02/23         Goal: STG-Within one week, patient will complete toileting tasks with setup and supervision       Dates: Start: 03/02/23   Expected End: 03/11/23

## 2023-03-06 NOTE — PROGRESS NOTES
Hospital Medicine Daily Progress Note        Chief Complaint  Hypertension  Diabetes    Interval Problem Update  Pt denies new complaints.  Labs reviewed.    Review of Systems  Review of Systems   Constitutional:  Negative for chills and fever.   HENT: Negative.     Eyes: Negative.    Respiratory:  Negative for cough and shortness of breath.    Cardiovascular:  Negative for chest pain and palpitations.   Gastrointestinal:  Negative for abdominal pain, nausea and vomiting.   Musculoskeletal: Negative.    Skin:  Negative for itching and rash.   Endo/Heme/Allergies:  Negative for polydipsia. Does not bruise/bleed easily.   Psychiatric/Behavioral:  Positive for depression. The patient is nervous/anxious.       Physical Exam  Temp:  [36.3 °C (97.3 °F)-36.7 °C (98 °F)] 36.7 °C (98 °F)  Pulse:  [51-58] 57  Resp:  [16-18] 18  BP: ()/(60-66) 104/66  SpO2:  [94 %-96 %] 96 %    Physical Exam  Vitals reviewed.   Constitutional:       General: He is not in acute distress.     Appearance: Normal appearance. He is not ill-appearing.   HENT:      Head: Normocephalic and atraumatic.      Right Ear: External ear normal.      Left Ear: External ear normal.      Nose: Nose normal.      Mouth/Throat:      Pharynx: Oropharynx is clear.   Eyes:      General:         Right eye: No discharge.         Left eye: No discharge.      Extraocular Movements: Extraocular movements intact.      Conjunctiva/sclera: Conjunctivae normal.   Cardiovascular:      Rate and Rhythm: Normal rate and regular rhythm.   Pulmonary:      Effort: Pulmonary effort is normal. No respiratory distress.      Breath sounds: Normal breath sounds. No wheezing.   Abdominal:      General: Bowel sounds are normal. There is no distension.      Palpations: Abdomen is soft.      Tenderness: There is no abdominal tenderness.   Musculoskeletal:      Cervical back: Normal range of motion and neck supple.      Right lower leg: No edema.      Left lower leg: No edema.   Skin:      General: Skin is warm and dry.   Neurological:      Mental Status: He is alert and oriented to person, place, and time.       Fluids    Intake/Output Summary (Last 24 hours) at 3/5/2023 1945  Last data filed at 3/5/2023 1800  Gross per 24 hour   Intake --   Output 3050 ml   Net -3050 ml       Laboratory  Recent Labs     03/04/23  0528   WBC 7.1   RBC 4.32*   HEMOGLOBIN 13.0*   HEMATOCRIT 39.4*   MCV 91.2   MCH 30.1   MCHC 33.0*   RDW 41.4   PLATELETCT 238   MPV 11.1     Recent Labs     03/04/23  0528   SODIUM 139   POTASSIUM 3.9   CHLORIDE 102   CO2 25   GLUCOSE 73   BUN 23*   CREATININE 0.83   CALCIUM 8.9                   Assessment/Plan  * Seizure disorder (HCC)- (present on admission)  Assessment & Plan  On Depakote and Keppra    Azotemia  Assessment & Plan  Encourage PO fluids  Follow renal function    Hx of thyroidectomy- (present on admission)  Assessment & Plan  TSH 7.55 and FT4 1.16  Already on high dose Synthroid  Needs outpt F/U    Primary hypertension- (present on admission)  Assessment & Plan  Decreased Lisinopril for low blood pressures  Watch for orthostatic hypotension on Prazosin    Mixed hyperlipidemia- (present on admission)  Assessment & Plan  On Lipitor and Fenofibrate    Type 2 diabetes mellitus without complication, without long-term current use of insulin (HCC)- (present on admission)  Assessment & Plan  HbA1c 7.8  On Farxiga, Metformin, Januvia, Lantus bid, and SSI  Will slowly taper off PO meds to minimize polypharmacy  Decrease Metformin  Observe serum glucose trends    Psychiatric problem- (present on admission)  Assessment & Plan  On Zoloft,Topamax, Latuda, and Prazosin    Glaucoma- (present on admission)  Assessment & Plan  On Cosopt    Asthma- (present on admission)  Assessment & Plan  On Symbicort and Singulair  RT protocol       Full Code

## 2023-03-06 NOTE — PROGRESS NOTES
"REHABILITATION PSYCHOLOGY FOLLOW-UP:  Reason for admission: Seizure disorder (HCC) [G40.909]  Length of Visit: 41 minutes    Chief Complaint: Adjustment to hospitalization    S: Met with the patient for brief individual psychotherapy. Patient presented with a euthymic affect and reported a \"doing okay\" mood.  Session focused on a CBT framework of cognitive reframing particularly as it relates to past traumatic experiences such as the break-up of his engagement approximately 1 year prior.  We will continue to follow    O: Psychiatric Examination:  Vitals: Blood pressure 101/55, pulse (!) 55, temperature 36.2 °C (97.1 °F), resp. rate 16, height 1.981 m (6' 6\"), weight 109 kg (240 lb), SpO2 96 %.  Musculoskeletal: Laying in bed normal psychomotor activity, no tics or unusual mannerisms noted  Appearance and Eye Contact: Easily established rapport WDWN, appropriate dress and grooming. Behavior is calm, cooperative,  appropriate eye contact  Attention/Alertness: Alert  Thought Process: Linear logical goal directed  Thought Content: No psychotic processes noted  Speech: Clear with normal rate and rhythm  Mood: \"Doing okay\"            Affect: Euthymic         SI/HI: Denies     Memory: Recent and remote memory appear intact      Orientation: Alert and oriented to person place and time  Insight into symptoms: Fair  Judgement into symptoms: Fair       ASSESSMENT: Norm Prabhakar is a 42 y.o. male with a PMH of TBI, seizures, conversion disorder, PTSD, BPD and right sided weakness with previous CVA who presented on 2/17/23 with multiple seizure. He reportedly had 4 separate seizures. CT head was negative for acute process. MRI was negative. He reports missed some of his medication at home. He was started on Keppra in addition to his home medications and has since not had a seizure in 10 days. He was found to have large neck mass and biopsy was performed with pending pathology.       Psychology was consulted due to significant " psychiatric history as well as difficulty with adjustment to hospitalization.  Psychology was consulted due to significant psychiatric history as well as unknown etiology of recent seizures.  Session focused on assessment of current functioning as well as psychoeducation regarding common cognitive and emotional impacts of hospitalization.  Patient reports recent struggles with depression and anxiety that have been longstanding but ongoing.  We will continue to follow through discharge to ensure appropriate engagement with therapies and medical staff.     DSM5 Diagnostic Considerations: Adjustment disorder with mixed anxiety and depressed mood        PLAN:  Records reviewed: Yes  Discussed patient with other provider: Luis  We will continue to follow  Thank you for the consult.     Gabrielle Cervantes, Ph.D

## 2023-03-06 NOTE — CARE PLAN
"The patient is Watcher - Medium risk of patient condition declining or worsening    Shift Goals  Clinical Goals: safety    Problem: Bladder / Voiding  Goal: Patient will establish and maintain bladder regimen  Note: Patient has been continent of bladder, urinal at bedside, no dysuria, will continue to monitor.      Problem: Fall Risk - Rehab  Goal: Patient will remain free from falls  Note: Mariluz Ball Fall risk Assessment Score: 16    High fall risk Interventions   - Bed and strip alarm   - Yellow sign by the door   - Yellow wrist band \"Fall risk\"  - Room near to the nurse station  - Do not leave patient unattended in the bathroom  - Fall risk education provided     "

## 2023-03-06 NOTE — PROGRESS NOTES
Hospital Medicine Daily Progress Note        Chief Complaint  Hypertension  Diabetes    Interval Problem Update  Blood pressures and blood sugars both trending low, but pt asymptomatic.    Review of Systems  Review of Systems   Constitutional:  Negative for chills and fever.   HENT: Negative.     Eyes: Negative.    Respiratory:  Negative for cough and shortness of breath.    Cardiovascular:  Negative for chest pain and palpitations.   Gastrointestinal:  Negative for abdominal pain, nausea and vomiting.   Musculoskeletal: Negative.    Skin:  Negative for itching and rash.   Endo/Heme/Allergies:  Negative for polydipsia. Does not bruise/bleed easily.   Psychiatric/Behavioral:  Positive for depression. The patient is nervous/anxious.       Physical Exam  Temp:  [36.2 °C (97.1 °F)-36.7 °C (98 °F)] 36.6 °C (97.8 °F)  Pulse:  [51-74] 74  Resp:  [16-18] 18  BP: (101-107)/(55-68) 107/65  SpO2:  [94 %-97 %] 94 %    Physical Exam  Vitals reviewed.   Constitutional:       General: He is not in acute distress.     Appearance: Normal appearance. He is not ill-appearing.   HENT:      Head: Normocephalic and atraumatic.      Right Ear: External ear normal.      Left Ear: External ear normal.      Nose: Nose normal.      Mouth/Throat:      Pharynx: Oropharynx is clear.   Eyes:      General:         Right eye: No discharge.         Left eye: No discharge.      Extraocular Movements: Extraocular movements intact.      Conjunctiva/sclera: Conjunctivae normal.   Cardiovascular:      Rate and Rhythm: Normal rate and regular rhythm.   Pulmonary:      Effort: Pulmonary effort is normal. No respiratory distress.      Breath sounds: Normal breath sounds. No wheezing.   Abdominal:      General: Bowel sounds are normal. There is no distension.      Palpations: Abdomen is soft.      Tenderness: There is no abdominal tenderness.   Musculoskeletal:      Cervical back: Normal range of motion and neck supple.      Right lower leg: No edema.       Left lower leg: No edema.   Skin:     General: Skin is warm and dry.   Neurological:      Mental Status: He is alert and oriented to person, place, and time.       Fluids    Intake/Output Summary (Last 24 hours) at 3/6/2023 1550  Last data filed at 3/6/2023 1500  Gross per 24 hour   Intake 360 ml   Output 2900 ml   Net -2540 ml       Laboratory  Recent Labs     03/04/23  0528   WBC 7.1   RBC 4.32*   HEMOGLOBIN 13.0*   HEMATOCRIT 39.4*   MCV 91.2   MCH 30.1   MCHC 33.0*   RDW 41.4   PLATELETCT 238   MPV 11.1     Recent Labs     03/04/23  0528   SODIUM 139   POTASSIUM 3.9   CHLORIDE 102   CO2 25   GLUCOSE 73   BUN 23*   CREATININE 0.83   CALCIUM 8.9                   Assessment/Plan  * Seizure disorder (HCC)- (present on admission)  Assessment & Plan  On Depakote and Keppra    Azotemia  Assessment & Plan  Continue to encourage PO fluids  Follow renal function    Hx of thyroidectomy- (present on admission)  Assessment & Plan  TSH 7.55 and FT4 1.16  Already on high dose Synthroid  Needs outpt F/U    Primary hypertension- (present on admission)  Assessment & Plan  Will D/C Lisinopril for ongoing low blood pressures  Watch for orthostatic hypotension on Prazosin    Mixed hyperlipidemia- (present on admission)  Assessment & Plan  On Lipitor and Fenofibrate    Type 2 diabetes mellitus without complication, without long-term current use of insulin (HCC)- (present on admission)  Assessment & Plan  HbA1c 7.8  On Lantus bid and SSI  Discontinued Farxiga, Januvia, and Metformin to minimize polypharmacy  Decrease Lantus further for hypoglycemic trends  Outpt meds include Farxiga, Januvia, Metformin, and bid Lantus    Psychiatric problem- (present on admission)  Assessment & Plan  On Zoloft,Topamax, Latuda, and Prazosin    Glaucoma- (present on admission)  Assessment & Plan  On Cosopt    Asthma- (present on admission)  Assessment & Plan  On Symbicort and Singulair  RT protocol       Full Code

## 2023-03-06 NOTE — CARE PLAN
The patient is Stable - Low risk of patient condition declining or worsening    Shift Goals  Clinical Goals: Safety  Patient Goals: Sleep    Progress made toward(s) clinical / shift goals:    Problem: Knowledge Deficit - Standard  Goal: Patient and family/care givers will demonstrate understanding of plan of care, disease process/condition, diagnostic tests and medications  3/6/2023 0718 by Little Jones R.N.  Outcome: Progressing  Patient educated on the POC and medications administered     Problem: Bladder / Voiding  Goal: Patient will establish and maintain bladder regimen  Outcome: Progressing   Patient has had continent voids throughout the shift   Problem: Fall Risk - Rehab  Goal: Patient will remain free from falls  Outcome: Progressing   Bed is locked and in low position. Call light and belongings within reach    Patient is not progressing towards the following goals:

## 2023-03-06 NOTE — PROGRESS NOTES
Hospital Medicine Daily Progress Note        Chief Complaint  Hypertension  Diabetes    Interval Problem Update  Blood pressures trending low; pt asymptomatic.    Review of Systems  Review of Systems   Constitutional:  Negative for chills and fever.   HENT: Negative.     Eyes: Negative.    Respiratory:  Negative for cough and shortness of breath.    Cardiovascular:  Negative for chest pain and palpitations.   Gastrointestinal:  Negative for abdominal pain, nausea and vomiting.   Musculoskeletal: Negative.    Skin:  Negative for itching and rash.   Endo/Heme/Allergies:  Negative for polydipsia. Does not bruise/bleed easily.   Psychiatric/Behavioral:  Positive for depression. The patient is nervous/anxious.       Physical Exam  Temp:  [36.3 °C (97.3 °F)-36.6 °C (97.9 °F)] 36.6 °C (97.9 °F)  Pulse:  [51-58] 51  Resp:  [16] 16  BP: (94-99)/(60-65) 99/65  SpO2:  [94 %-96 %] 96 %    Physical Exam  Vitals reviewed.   Constitutional:       General: He is not in acute distress.     Appearance: Normal appearance. He is not ill-appearing.   HENT:      Head: Normocephalic and atraumatic.      Right Ear: External ear normal.      Left Ear: External ear normal.      Nose: Nose normal.      Mouth/Throat:      Pharynx: Oropharynx is clear.   Eyes:      General:         Right eye: No discharge.         Left eye: No discharge.      Extraocular Movements: Extraocular movements intact.      Conjunctiva/sclera: Conjunctivae normal.   Cardiovascular:      Rate and Rhythm: Normal rate and regular rhythm.   Pulmonary:      Effort: Pulmonary effort is normal. No respiratory distress.      Breath sounds: Normal breath sounds. No wheezing.   Abdominal:      General: Bowel sounds are normal. There is no distension.      Palpations: Abdomen is soft.      Tenderness: There is no abdominal tenderness.   Musculoskeletal:      Cervical back: Normal range of motion and neck supple.      Right lower leg: No edema.      Left lower leg: No edema.    Skin:     General: Skin is warm and dry.   Neurological:      Mental Status: He is alert and oriented to person, place, and time.       Fluids    Intake/Output Summary (Last 24 hours) at 3/5/2023 1910  Last data filed at 3/5/2023 1800  Gross per 24 hour   Intake --   Output 3050 ml   Net -3050 ml       Laboratory  Recent Labs     03/04/23  0528   WBC 7.1   RBC 4.32*   HEMOGLOBIN 13.0*   HEMATOCRIT 39.4*   MCV 91.2   MCH 30.1   MCHC 33.0*   RDW 41.4   PLATELETCT 238   MPV 11.1     Recent Labs     03/04/23  0528   SODIUM 139   POTASSIUM 3.9   CHLORIDE 102   CO2 25   GLUCOSE 73   BUN 23*   CREATININE 0.83   CALCIUM 8.9                   Assessment/Plan  * Seizure disorder (HCC)- (present on admission)  Assessment & Plan  On Depakote and Keppra    Hx of thyroidectomy- (present on admission)  Assessment & Plan  TSH 7.55 and FT4 1.16  Already on high dose Synthroid  Needs outpt F/U    Primary hypertension- (present on admission)  Assessment & Plan  Decrease Lisinopril for low blood pressures  Watch for orthostatic hypotension on Prazosin    Mixed hyperlipidemia- (present on admission)  Assessment & Plan  On Lipitor and Fenofibrate    Type 2 diabetes mellitus without complication, without long-term current use of insulin (HCC)- (present on admission)  Assessment & Plan  HbA1c 7.8  On Farxiga, Metformin, Januvia, Lantus bid, and SSI  Will slowly taper off PO meds to minimize polypharmacy  Decrease Farxiga and Januvia  Observe serum glucose trends    Psychiatric problem- (present on admission)  Assessment & Plan  On Zoloft,Topamax, Latuda, and Prazosin    Glaucoma- (present on admission)  Assessment & Plan  On Cosopt    Asthma- (present on admission)  Assessment & Plan  On Symbicort and Singulair  RT protocol       Full Code

## 2023-03-06 NOTE — PROGRESS NOTES
Hospital Medicine Daily Progress Note        Chief Complaint  Hypertension  Diabetes    Interval Problem Update  FSBS remains low; will continue to decrease bid Lantus.    Review of Systems  Review of Systems   Constitutional:  Negative for chills and fever.   HENT: Negative.     Eyes: Negative.    Respiratory:  Negative for cough and shortness of breath.    Cardiovascular:  Negative for chest pain and palpitations.   Gastrointestinal:  Negative for abdominal pain, nausea and vomiting.   Musculoskeletal: Negative.    Skin:  Negative for itching and rash.   Endo/Heme/Allergies:  Negative for polydipsia. Does not bruise/bleed easily.   Psychiatric/Behavioral:  Positive for depression. The patient is nervous/anxious.       Physical Exam  Temp:  [36.2 °C (97.1 °F)-36.7 °C (98 °F)] 36.6 °C (97.8 °F)  Pulse:  [51-74] 74  Resp:  [16-18] 18  BP: (101-107)/(55-68) 107/65  SpO2:  [94 %-97 %] 94 %    Physical Exam  Vitals reviewed.   Constitutional:       General: He is not in acute distress.     Appearance: Normal appearance. He is not ill-appearing.   HENT:      Head: Normocephalic and atraumatic.      Right Ear: External ear normal.      Left Ear: External ear normal.      Nose: Nose normal.      Mouth/Throat:      Pharynx: Oropharynx is clear.   Eyes:      General:         Right eye: No discharge.         Left eye: No discharge.      Extraocular Movements: Extraocular movements intact.      Conjunctiva/sclera: Conjunctivae normal.   Cardiovascular:      Rate and Rhythm: Normal rate and regular rhythm.   Pulmonary:      Effort: Pulmonary effort is normal. No respiratory distress.      Breath sounds: Normal breath sounds. No wheezing.   Abdominal:      General: Bowel sounds are normal. There is no distension.      Palpations: Abdomen is soft.      Tenderness: There is no abdominal tenderness.   Musculoskeletal:      Cervical back: Normal range of motion and neck supple.      Right lower leg: No edema.      Left lower leg: No  edema.   Skin:     General: Skin is warm and dry.   Neurological:      Mental Status: He is alert and oriented to person, place, and time.       Fluids    Intake/Output Summary (Last 24 hours) at 3/6/2023 1553  Last data filed at 3/6/2023 1500  Gross per 24 hour   Intake 360 ml   Output 2900 ml   Net -2540 ml       Laboratory  Recent Labs     03/04/23  0528   WBC 7.1   RBC 4.32*   HEMOGLOBIN 13.0*   HEMATOCRIT 39.4*   MCV 91.2   MCH 30.1   MCHC 33.0*   RDW 41.4   PLATELETCT 238   MPV 11.1     Recent Labs     03/04/23  0528   SODIUM 139   POTASSIUM 3.9   CHLORIDE 102   CO2 25   GLUCOSE 73   BUN 23*   CREATININE 0.83   CALCIUM 8.9                   Assessment/Plan  * Seizure disorder (HCC)- (present on admission)  Assessment & Plan  On Depakote and Keppra    Azotemia  Assessment & Plan  Continue to encourage PO fluids  Follow renal function    Hx of thyroidectomy- (present on admission)  Assessment & Plan  TSH 7.55 and FT4 1.16  Already on high dose Synthroid  Needs outpt F/U    Primary hypertension- (present on admission)  Assessment & Plan  Discontinued Lisinopril for ongoing low blood pressures  Watch for orthostatic hypotension on Prazosin    Mixed hyperlipidemia- (present on admission)  Assessment & Plan  On Lipitor and Fenofibrate    Type 2 diabetes mellitus without complication, without long-term current use of insulin (HCC)- (present on admission)  Assessment & Plan  HbA1c 7.8  On Lantus bid and SSI  Discontinued Farxiga, Januvia, and Metformin to minimize polypharmacy  Serially decreased Lantus for hypoglycemic trends  Outpt meds include Farxiga, Januvia, Metformin, and bid Lantus    Psychiatric problem- (present on admission)  Assessment & Plan  On Zoloft,Topamax, Latuda, and Prazosin    Glaucoma- (present on admission)  Assessment & Plan  On Cosopt    Asthma- (present on admission)  Assessment & Plan  On Symbicort and Singulair  RT protocol       Full Code    Discussed w/ RN (Chloe)

## 2023-03-06 NOTE — PROGRESS NOTES
Hospital Medicine Daily Progress Note        Chief Complaint  Hypertension  Diabetes    Interval Problem Update  Last 24 hour FSBS have been <100.    Review of Systems  Review of Systems   Constitutional:  Negative for chills and fever.   HENT: Negative.     Eyes: Negative.    Respiratory:  Negative for cough and shortness of breath.    Cardiovascular:  Negative for chest pain and palpitations.   Gastrointestinal:  Negative for abdominal pain, nausea and vomiting.   Musculoskeletal: Negative.    Skin:  Negative for itching and rash.   Endo/Heme/Allergies:  Negative for polydipsia. Does not bruise/bleed easily.   Psychiatric/Behavioral:  Positive for depression. The patient is nervous/anxious.       Physical Exam  Temp:  [36.2 °C (97.1 °F)-36.7 °C (98 °F)] 36.6 °C (97.8 °F)  Pulse:  [51-74] 74  Resp:  [16-18] 18  BP: (101-107)/(55-68) 107/65  SpO2:  [94 %-97 %] 94 %    Physical Exam  Vitals reviewed.   Constitutional:       General: He is not in acute distress.     Appearance: Normal appearance. He is not ill-appearing.   HENT:      Head: Normocephalic and atraumatic.      Right Ear: External ear normal.      Left Ear: External ear normal.      Nose: Nose normal.      Mouth/Throat:      Pharynx: Oropharynx is clear.   Eyes:      General:         Right eye: No discharge.         Left eye: No discharge.      Extraocular Movements: Extraocular movements intact.      Conjunctiva/sclera: Conjunctivae normal.   Cardiovascular:      Rate and Rhythm: Normal rate and regular rhythm.   Pulmonary:      Effort: Pulmonary effort is normal. No respiratory distress.      Breath sounds: Normal breath sounds. No wheezing.   Abdominal:      General: Bowel sounds are normal. There is no distension.      Palpations: Abdomen is soft.      Tenderness: There is no abdominal tenderness.   Musculoskeletal:      Cervical back: Normal range of motion and neck supple.      Right lower leg: No edema.      Left lower leg: No edema.   Skin:      General: Skin is warm and dry.   Neurological:      Mental Status: He is alert and oriented to person, place, and time.       Fluids    Intake/Output Summary (Last 24 hours) at 3/6/2023 1539  Last data filed at 3/6/2023 1500  Gross per 24 hour   Intake 360 ml   Output 2900 ml   Net -2540 ml       Laboratory  Recent Labs     03/04/23  0528   WBC 7.1   RBC 4.32*   HEMOGLOBIN 13.0*   HEMATOCRIT 39.4*   MCV 91.2   MCH 30.1   MCHC 33.0*   RDW 41.4   PLATELETCT 238   MPV 11.1     Recent Labs     03/04/23  0528   SODIUM 139   POTASSIUM 3.9   CHLORIDE 102   CO2 25   GLUCOSE 73   BUN 23*   CREATININE 0.83   CALCIUM 8.9                   Assessment/Plan  * Seizure disorder (HCC)- (present on admission)  Assessment & Plan  On Depakote and Keppra    Azotemia  Assessment & Plan  Encourage PO fluids  Follow renal function  Check F/U labs in AM    Hx of thyroidectomy- (present on admission)  Assessment & Plan  TSH 7.55 and FT4 1.16  Already on high dose Synthroid  Needs outpt F/U    Primary hypertension- (present on admission)  Assessment & Plan  Decrease Lisinopril further for low blood pressures  Watch for orthostatic hypotension on Prazosin    Mixed hyperlipidemia- (present on admission)  Assessment & Plan  On Lipitor and Fenofibrate    Type 2 diabetes mellitus without complication, without long-term current use of insulin (HCC)- (present on admission)  Assessment & Plan  HbA1c 7.8  On Farxiga, Metformin, Januvia, Lantus bid, and SSI  Will slowly taper off PO meds to minimize polypharmacy  Discontinue Farxiga and Januvia  Observe serum glucose trends    Psychiatric problem- (present on admission)  Assessment & Plan  On Zoloft,Topamax, Latuda, and Prazosin    Glaucoma- (present on admission)  Assessment & Plan  On Cosopt    Asthma- (present on admission)  Assessment & Plan  On Symbicort and Singulair  RT protocol       Full Code

## 2023-03-06 NOTE — ASSESSMENT & PLAN NOTE
HbaA1c: 7.8 (2/28)  BS:   Off Metformin  On Lantus 20 units bid --> 20 units qam (3/8) --> 18 units qam (3/11)  Note: home meds include Lantus, Farxiga, Januvia, Metformin 1000 mg bid, Humalog 12 units at meals  Cont to monitor

## 2023-03-06 NOTE — THERAPY
Physical Therapy   Daily Treatment     Patient Name: Norm Prabhakar  Age:  42 y.o., Sex:  male  Medical Record #: [id number]  Today's Date: 3/6/2023     Precautions  Precautions: Fall Risk  Comments: seizure, R hemiparesis; possible conversion disorder    Subjective    Pt received up in wc in main gym from OT, agreeable to participate. Reported that he had just taken pain meds from RN for LBP and was feeling better. Perseverative on wanting a Coca [employer] despite pt edu about the effect of diet/sugars d/t his hx of DM, eventually agreeable to a Diet Coke.     Objective       03/06/23 0931   PT Charge Group   PT Gait Training (Units) 2   PT Total Time Spent   PT Individual Total Time Spent (Mins) 30   Pain 0 - 10 Group   Location Back   Location Orientation Lower   Therapist Pain Assessment Prior to Activity   Cognition    Attention Impaired   Gait Functional Level of Assist    Gait Level Of Assist Contact Guard Assist   Assistive Device Front Wheel Walker   Distance (Feet) 70   # of Times Distance was Traveled 1  (plus 50 ft)   Deviation Decreased Base Of Support;Bradykinetic;Shuffled Gait;Decreased Heel Strike;Decreased Toe Off   Transfer Functional Level of Assist   Bed, Chair, Wheelchair Transfer Contact Guard Assist   Bed Chair Wheelchair Transfer Description Adaptive equipment;Increased time;Set-up of equipment;Supervision for safety;Verbal cueing  (stand step FWW)   Sitting Lower Body Exercises   Sit to Stand   (x5 reps with FWW)   Bed Mobility    Sit to Stand Standby Assist   Scooting Supervised  (in wc)   Interdisciplinary Plan of Care Collaboration   IDT Collaboration with  Occupational Therapist;Nursing   Patient Position at End of Therapy Seated;Chair Alarm On;Self Releasing Lap Belt Applied;Call Light within Reach;Tray Table within Reach;Phone within Reach   Collaboration Comments handoff from OT in main gym; RN approved diet coke per pt request         Assessment    Pt with good tolerance for gait training  today although required inc cues for attention to task. Presented with very bradykinetic gait speed and would benefit from cont gait/endurance training.    Strengths: Able to follow instructions, Alert and oriented, Effective communication skills, Independent prior level of function, Motivated for self care and independence, Pleasant and cooperative, Willingly participates in therapeutic activities  Barriers: Decreased endurance, Fatigue, Hemiparesis, Impaired activity tolerance, Impaired balance, Impaired functional cognition, Limited mobility (hx of TBI and CVA)    Plan    Progressive gait with FWW, stairs/curb when appropriate, LE strengthening and endurance. Consider TENS during tx if back pain is limiting activity     Passport items to be completed:  Get in/out of bed safely, in/out of a vehicle, safely use mobility device, walk or wheel around home/community, navigate up and down stairs, show how to get up/down from the ground, ensure home is accessible, demonstrate HEP, complete caregiver training    Physical Therapy Problems (Active)       Problem: Mobility       Dates: Start: 02/28/23         Goal: STG-Within one week, patient will ambulate household distance 25 ft x2 with FWW and CGA.       Dates: Start: 02/28/23   Expected End: 03/11/23         Goal Note filed on 03/02/23 1342 by Elke Hernandez, BABS       20 ft x2 with min A and wc follow              Goal: STG-Within one week, patient will ambulate up/down a curb with FWW and min A.       Dates: Start: 02/28/23   Expected End: 03/11/23         Goal Note filed on 03/02/23 1342 by Elke Hernandez, PT       Not yet assessed d/t safety concerns              Goal: STG-Within one week, patient will ambulate up/down flight of stairs with min A using BHR and RB prn.       Dates: Start: 02/28/23   Expected End: 03/11/23         Goal Note filed on 03/02/23 1342 by Elke Hernandez, BABS       Not yet assessed d/t safety concerns                 Problem: Mobility Transfers        Dates: Start: 02/28/23         Goal: STG-Within one week, patient will transfer bed to chair with FWW and SBA.       Dates: Start: 02/28/23   Expected End: 03/11/23         Goal Note filed on 03/02/23 1342 by Elke Hernandez, PT       CGA                 Problem: PT-Long Term Goals       Dates: Start: 02/28/23         Goal: LTG-By discharge, patient will ambulate household distances at least 150 ft with FWW vs LRAD and SPV.       Dates: Start: 02/28/23   Expected End: 03/11/23            Goal: LTG-By discharge, patient will transfer one surface to another with FWW vs LRAD and SPV.       Dates: Start: 02/28/23   Expected End: 03/11/23            Goal: LTG-By discharge, patient will ambulate up/down flight of stairs with BHR and SPV requiring no RB.       Dates: Start: 02/28/23   Expected End: 03/11/23            Goal: LTG-By discharge, patient will transfer in/out of a car with FWW vs LRAD and set up A.       Dates: Start: 02/28/23   Expected End: 03/11/23

## 2023-03-06 NOTE — FLOWSHEET NOTE
03/06/23 1258   Events/Summary/Plan   Events/Summary/Plan SpO2 check   Vital Signs   Pulse 60   Respiration 16   Pulse Oximetry 97 %   $ Pulse Oximetry (Spot Check) Yes   Respiratory Assessment   Respiratory Pattern Within Normal Limits   Level of Consciousness Alert   Chest Exam   Work Of Breathing / Effort Within Normal Limits   Oxygen   O2 Delivery Device None - Room Air

## 2023-03-06 NOTE — THERAPY
Speech Language Pathology  Daily Treatment     Patient Name: Norm Prabhakar  Age:  42 y.o., Sex:  male  Medical Record #: [id number]  Today's Date: 3/6/2023     Precautions  Precautions: Fall Risk  Comments: seizure, R hemiparesis; possible conversion disorder    Subjective    Patient up in wheel chair, pleasant and agreeable to therapy.     Objective       03/06/23 1101   Treatment Charges   SLP Cognitive Skill Development First 15 Minutes 1   SLP Cognitive Skill Development Additional 15 Minutes 1   SLP Total Time Spent   SLP Individual Total Time Spent (Mins) 30   Cognition   Moderate Attention Minimal (4)   Functional Memory Activities Supervision (5)   Functional Problem Solving Minimal (4)       Assessment    Patient completed a functional organization task with 4/5 items correct (80%) with min cues needed to improve.  He did need cuing to perform alphabetizing component and attend to one critical detail for org. Task.   Patient was able to recall 5/5 unrelated words after a 15 min delay using RWAVS compensatory memory strategies.    Strengths: Able to follow instructions, Alert and oriented, Supportive family, Willingly participates in therapeutic activities  Barriers: Impaired carryover of learning, Impaired insight/denial of deficits, Impaired functional cognition    Plan    Target attention, problem solving, recall.    Passport items to be completed:  Express basic needs, understand food/liquid recommendations, consistently follow swallow precautions, manage finances, manage medications, arrive to therapy appointments on time, complete daily memory log entries, solve problems related to safety situations, review education related to hospitalization, complete caregiver training     Speech Therapy Problems (Active)       Problem: Memory STGs       Dates: Start: 02/28/23         Goal: STG-Within one week, patient will recall new training, daily events with 75% acc with min A.       Dates: Start: 02/28/23    Expected End: 03/11/23         Goal Note filed on 03/02/23 1314 by Lulu Leija MS,CCC-SLP       Min to mod cues for recall of new training, with low attention to new training.              Goal: STG-Within one week, patient will       Dates: Start: 02/28/23   Expected End: 03/11/23               Problem: Speech/Swallowing LTGs       Dates: Start: 02/28/23         Goal: LTG-By discharge, patient will solve basic problems and recall safety training with 80% acc with min A.       Dates: Start: 02/28/23   Expected End: 03/11/23

## 2023-03-07 ENCOUNTER — APPOINTMENT (OUTPATIENT)
Dept: PHYSICAL THERAPY | Facility: REHABILITATION | Age: 43
DRG: 101 | End: 2023-03-07
Attending: PHYSICAL MEDICINE & REHABILITATION
Payer: MEDICAID

## 2023-03-07 ENCOUNTER — APPOINTMENT (OUTPATIENT)
Dept: OCCUPATIONAL THERAPY | Facility: REHABILITATION | Age: 43
DRG: 101 | End: 2023-03-07
Attending: PHYSICAL MEDICINE & REHABILITATION
Payer: MEDICAID

## 2023-03-07 ENCOUNTER — APPOINTMENT (OUTPATIENT)
Dept: SPEECH THERAPY | Facility: REHABILITATION | Age: 43
End: 2023-03-07
Attending: PHYSICAL MEDICINE & REHABILITATION
Payer: MEDICAID

## 2023-03-07 LAB
GLUCOSE BLD STRIP.AUTO-MCNC: 109 MG/DL (ref 65–99)
GLUCOSE BLD STRIP.AUTO-MCNC: 126 MG/DL (ref 65–99)
GLUCOSE BLD STRIP.AUTO-MCNC: 85 MG/DL (ref 65–99)
GLUCOSE BLD STRIP.AUTO-MCNC: 90 MG/DL (ref 65–99)

## 2023-03-07 PROCEDURE — 97130 THER IVNTJ EA ADDL 15 MIN: CPT

## 2023-03-07 PROCEDURE — 770010 HCHG ROOM/CARE - REHAB SEMI PRIVAT*

## 2023-03-07 PROCEDURE — 700102 HCHG RX REV CODE 250 W/ 637 OVERRIDE(OP): Performed by: PHYSICAL MEDICINE & REHABILITATION

## 2023-03-07 PROCEDURE — 97129 THER IVNTJ 1ST 15 MIN: CPT

## 2023-03-07 PROCEDURE — 99232 SBSQ HOSP IP/OBS MODERATE 35: CPT | Performed by: HOSPITALIST

## 2023-03-07 PROCEDURE — 700111 HCHG RX REV CODE 636 W/ 250 OVERRIDE (IP): Performed by: PHYSICAL MEDICINE & REHABILITATION

## 2023-03-07 PROCEDURE — 97110 THERAPEUTIC EXERCISES: CPT

## 2023-03-07 PROCEDURE — 97112 NEUROMUSCULAR REEDUCATION: CPT

## 2023-03-07 PROCEDURE — 97116 GAIT TRAINING THERAPY: CPT

## 2023-03-07 PROCEDURE — A9270 NON-COVERED ITEM OR SERVICE: HCPCS | Performed by: PHYSICAL MEDICINE & REHABILITATION

## 2023-03-07 PROCEDURE — 82962 GLUCOSE BLOOD TEST: CPT

## 2023-03-07 PROCEDURE — 700102 HCHG RX REV CODE 250 W/ 637 OVERRIDE(OP): Performed by: HOSPITALIST

## 2023-03-07 PROCEDURE — 99232 SBSQ HOSP IP/OBS MODERATE 35: CPT | Performed by: PHYSICAL MEDICINE & REHABILITATION

## 2023-03-07 PROCEDURE — A9270 NON-COVERED ITEM OR SERVICE: HCPCS | Performed by: HOSPITALIST

## 2023-03-07 PROCEDURE — 94640 AIRWAY INHALATION TREATMENT: CPT

## 2023-03-07 PROCEDURE — 97530 THERAPEUTIC ACTIVITIES: CPT

## 2023-03-07 RX ORDER — INSULIN LISPRO 100 [IU]/ML
2-12 INJECTION, SOLUTION INTRAVENOUS; SUBCUTANEOUS
Status: DISCONTINUED | OUTPATIENT
Start: 2023-03-07 | End: 2023-03-11 | Stop reason: HOSPADM

## 2023-03-07 RX ADMIN — TRAZODONE HYDROCHLORIDE 100 MG: 100 TABLET ORAL at 20:07

## 2023-03-07 RX ADMIN — BUDESONIDE AND FORMOTEROL FUMARATE DIHYDRATE 2 PUFF: 160; 4.5 AEROSOL RESPIRATORY (INHALATION) at 12:22

## 2023-03-07 RX ADMIN — DORZOLAMIDE HYDROCHLORIDE AND TIMOLOL MALEATE 1 DROP: 20; 5 SOLUTION/ DROPS OPHTHALMIC at 20:10

## 2023-03-07 RX ADMIN — LURASIDONE HYDROCHLORIDE 20 MG: 20 TABLET, FILM COATED ORAL at 18:00

## 2023-03-07 RX ADMIN — DIVALPROEX SODIUM 500 MG: 500 TABLET, DELAYED RELEASE ORAL at 08:23

## 2023-03-07 RX ADMIN — LEVOTHYROXINE SODIUM 25 MCG: 0.03 TABLET ORAL at 05:18

## 2023-03-07 RX ADMIN — PRAZOSIN HYDROCHLORIDE 5 MG: 5 CAPSULE ORAL at 20:06

## 2023-03-07 RX ADMIN — ENOXAPARIN SODIUM 40 MG: 40 INJECTION SUBCUTANEOUS at 08:23

## 2023-03-07 RX ADMIN — METFORMIN HYDROCHLORIDE 500 MG: 500 TABLET ORAL at 08:24

## 2023-03-07 RX ADMIN — SENNOSIDES AND DOCUSATE SODIUM 2 TABLET: 50; 8.6 TABLET ORAL at 08:24

## 2023-03-07 RX ADMIN — OMEPRAZOLE 20 MG: 20 CAPSULE, DELAYED RELEASE ORAL at 08:23

## 2023-03-07 RX ADMIN — LEVOTHYROXINE SODIUM 200 MCG: 0.12 TABLET ORAL at 05:17

## 2023-03-07 RX ADMIN — MONTELUKAST 10 MG: 10 TABLET, FILM COATED ORAL at 20:07

## 2023-03-07 RX ADMIN — FENOFIBRATE 134 MG: 67 CAPSULE ORAL at 08:24

## 2023-03-07 RX ADMIN — DORZOLAMIDE HYDROCHLORIDE AND TIMOLOL MALEATE 1 DROP: 20; 5 SOLUTION/ DROPS OPHTHALMIC at 08:25

## 2023-03-07 RX ADMIN — ATORVASTATIN CALCIUM 20 MG: 10 TABLET, FILM COATED ORAL at 20:06

## 2023-03-07 RX ADMIN — Medication 4000 UNITS: at 20:07

## 2023-03-07 RX ADMIN — LORATADINE 10 MG: 10 TABLET ORAL at 08:24

## 2023-03-07 RX ADMIN — DIVALPROEX SODIUM 1500 MG: 500 TABLET, DELAYED RELEASE ORAL at 20:05

## 2023-03-07 RX ADMIN — TOPIRAMATE 50 MG: 25 TABLET, FILM COATED ORAL at 08:24

## 2023-03-07 RX ADMIN — BUDESONIDE AND FORMOTEROL FUMARATE DIHYDRATE 2 PUFF: 160; 4.5 AEROSOL RESPIRATORY (INHALATION) at 20:10

## 2023-03-07 RX ADMIN — SERTRALINE HYDROCHLORIDE 150 MG: 50 TABLET, FILM COATED ORAL at 20:07

## 2023-03-07 RX ADMIN — LEVETIRACETAM 1000 MG: 500 TABLET, FILM COATED ORAL at 08:23

## 2023-03-07 RX ADMIN — METFORMIN HYDROCHLORIDE 250 MG: 500 TABLET ORAL at 17:06

## 2023-03-07 RX ADMIN — LEVETIRACETAM 1000 MG: 500 TABLET, FILM COATED ORAL at 20:05

## 2023-03-07 RX ADMIN — TOPIRAMATE 50 MG: 25 TABLET, FILM COATED ORAL at 20:07

## 2023-03-07 ASSESSMENT — GAIT ASSESSMENTS
ASSISTIVE DEVICE: FRONT WHEEL WALKER
DISTANCE (FEET): 70
DEVIATION: DECREASED BASE OF SUPPORT;BRADYKINETIC;SHUFFLED GAIT;DECREASED HEEL STRIKE;DECREASED TOE OFF
GAIT LEVEL OF ASSIST: CONTACT GUARD ASSIST

## 2023-03-07 ASSESSMENT — 10 METER WALK TEST (10METWT)
AVERAGE TIME - SECONDS: 42
AVERAGE VELOCITY - METERS PER SECOND: 0.14
TRIAL 2: TIME TO WALK 10 METERS: 43
TRIAL 3: TIME TO WALK 10 METERS: 45
TRIAL 1: TIME TO WALK 10 METERS: 38

## 2023-03-07 ASSESSMENT — ACTIVITIES OF DAILY LIVING (ADL)
BED_CHAIR_WHEELCHAIR_TRANSFER_DESCRIPTION: ADAPTIVE EQUIPMENT;INCREASED TIME;INITIAL PREPARATION FOR TASK;SET-UP OF EQUIPMENT;SUPERVISION FOR SAFETY;VERBAL CUEING
BED_CHAIR_WHEELCHAIR_TRANSFER_DESCRIPTION: INCREASED TIME;SET-UP OF EQUIPMENT;SUPERVISION FOR SAFETY;VERBAL CUEING
BED_CHAIR_WHEELCHAIR_TRANSFER_DESCRIPTION: ADAPTIVE EQUIPMENT;INCREASED TIME;SET-UP OF EQUIPMENT;SUPERVISION FOR SAFETY;VERBAL CUEING
BED_CHAIR_WHEELCHAIR_TRANSFER_DESCRIPTION: ADAPTIVE EQUIPMENT;INCREASED TIME;SET-UP OF EQUIPMENT;SQUAT PIVOT TRANSFER TO WHEELCHAIR;SUPERVISION FOR SAFETY

## 2023-03-07 ASSESSMENT — ENCOUNTER SYMPTOMS
VOMITING: 0
NAUSEA: 0
CHILLS: 0
NERVOUS/ANXIOUS: 1
DIARRHEA: 0
ABDOMINAL PAIN: 0
SHORTNESS OF BREATH: 0
FEVER: 0

## 2023-03-07 NOTE — THERAPY
Speech Language Pathology  Daily Treatment     Patient Name: Nomr Prabhakar  Age:  42 y.o., Sex:  male  Medical Record #: [id number]  Today's Date: 3/7/2023     Precautions  Precautions: Fall Risk  Comments: seizure, R hemiparesis; possible conversion disorder    Subjective    Patient reports that he purchased a suit and accessories to surprise his mom.  He is pleasant and agreeable.     Objective       03/07/23 1301   Treatment Charges   SLP Cognitive Skill Development First 15 Minutes 1   SLP Cognitive Skill Development Additional 15 Minutes 1   SLP Total Time Spent   SLP Individual Total Time Spent (Mins) 30   Cognition   Moderate Attention Minimal (4)   Functional Problem Solving Minimal (4)         Assessment    Patient worked on attention and problem solving in context of simple organization and reasoning task ( [employer]).  Min cues for attention, min to mod cues for simple reasoning.    Strengths: Able to follow instructions, Alert and oriented, Supportive family, Willingly participates in therapeutic activities  Barriers: Impaired carryover of learning, Impaired insight/denial of deficits, Impaired functional cognition    Plan    Target attention and organization in the context of simple budgeting.    Passport items to be completed:  Express basic needs, understand food/liquid recommendations, consistently follow swallow precautions, manage finances, manage medications, arrive to therapy appointments on time, complete daily memory log entries, solve problems related to safety situations, review education related to hospitalization, complete caregiver training     Speech Therapy Problems (Active)       Problem: Memory STGs       Dates: Start: 02/28/23         Goal: STG-Within one week, patient will recall new training, daily events with 75% acc with min A.       Dates: Start: 02/28/23   Expected End: 03/11/23         Goal Note filed on 03/02/23 1314 by Lulu Leija MS,CCC-SLP       Min to mod cues for  recall of new training, with low attention to new training.              Goal: STG-Within one week, patient will       Dates: Start: 02/28/23   Expected End: 03/11/23               Problem: Speech/Swallowing LTGs       Dates: Start: 02/28/23         Goal: LTG-By discharge, patient will solve basic problems and recall safety training with 80% acc with min A.       Dates: Start: 02/28/23   Expected End: 03/11/23

## 2023-03-07 NOTE — PROGRESS NOTES
Hospital Medicine Daily Progress Note        Chief Complaint  Hypertension  Diabetes    Interval Problem Update  Discussed about his BS rising up and have started Metformin.    Review of Systems  Review of Systems   Constitutional:  Negative for chills and fever.   Respiratory:  Negative for shortness of breath.    Cardiovascular:  Negative for chest pain.   Gastrointestinal:  Negative for abdominal pain, diarrhea, nausea and vomiting.   Psychiatric/Behavioral:  The patient is nervous/anxious.       Physical Exam  Temp:  [36.2 °C (97.2 °F)-36.6 °C (97.8 °F)] 36.3 °C (97.3 °F)  Pulse:  [60-74] 60  Resp:  [16-18] 18  BP: ()/(60-65) 108/60  SpO2:  [94 %-97 %] 96 %    Physical Exam  Vitals and nursing note reviewed.   Constitutional:       Appearance: Normal appearance.   HENT:      Head: Atraumatic.   Eyes:      Conjunctiva/sclera: Conjunctivae normal.      Pupils: Pupils are equal, round, and reactive to light.   Cardiovascular:      Rate and Rhythm: Normal rate and regular rhythm.      Heart sounds: No murmur heard.  Pulmonary:      Effort: Pulmonary effort is normal.      Breath sounds: No stridor. No wheezing or rales.   Abdominal:      General: There is no distension.      Palpations: Abdomen is soft.      Tenderness: There is no abdominal tenderness.   Musculoskeletal:      Cervical back: Normal range of motion and neck supple.      Right lower leg: No edema.      Left lower leg: No edema.   Skin:     General: Skin is warm and dry.      Findings: No rash.   Neurological:      Mental Status: He is alert and oriented to person, place, and time.   Psychiatric:         Mood and Affect: Mood normal.         Behavior: Behavior normal.       Fluids    Intake/Output Summary (Last 24 hours) at 3/7/2023 0734  Last data filed at 3/6/2023 2000  Gross per 24 hour   Intake 480 ml   Output 400 ml   Net 80 ml         Laboratory                            Assessment/Plan  * Seizure disorder (HCC)- (present on  admission)  Assessment & Plan  On Depakote and Keppra    Azotemia  Assessment & Plan  Bun: 28 --> 23  Encouraging fluid intake  Monitor    Hypothyroidism- (present on admission)  Assessment & Plan  Hx thyroidectomy  TSH: 7.55  FT4: 1.16  ? Subclinical   On Synthroid  Needs repeat TFT's as an outpatient    Primary hypertension- (present on admission)  Assessment & Plan  BP low normal but ok  Off Lisinopril  On Minipress  Note: on Prazosin  Monitor    Mixed hyperlipidemia- (present on admission)  Assessment & Plan  On Lipitor  On Fenofibrate    Type 2 diabetes mellitus without complication, without long-term current use of insulin (HCC)- (present on admission)  Assessment & Plan  HbaA1c: 7.8 (2/28)  BS elevated recently in the 200's  Off Farxiga, Januvia, and Metformin  On Lantus -- dose recently decreased   Will restart Metformin  Note: home meds include Lantus, Farxiga, Januvia, Metformin  Cont to monitor    Psychiatric problem- (present on admission)  Assessment & Plan  On Zoloft,Topamax, Latuda    Glaucoma- (present on admission)  Assessment & Plan  On Cosopt    Asthma- (present on admission)  Assessment & Plan  On Symbicort and Singulair

## 2023-03-07 NOTE — THERAPY
Occupational Therapy  Daily Treatment     Patient Name: Norm Prabhakar  Age:  42 y.o., Sex:  male  Medical Record #: [id number]  Today's Date: 3/7/2023     Precautions  Precautions: Fall Risk  Comments: seizure, R hemiparesis; possible conversion disorder         Subjective    Patient was in bed on phone upon arrival. Patient was agreeable to OT session.      Objective       03/07/23 0931   OT Charge Group   OT Neuromuscular Re-education / Balance (Units) 2   OT Therapy Activity (Units) 1   OT Therapeutic Exercise (Units) 1   OT Total Time Spent   OT Individual Total Time Spent (Mins) 60   Cosignature   Documentation Review Approved as originally documented and filed.   Precautions   Precautions Fall Risk   Comments seizure, R hemiparesis; possible conversion disorder   Non Verbal Descriptors   Non Verbal Scale  Calm   Cognition    Level of Consciousness Alert   Functional Level of Assist   Bed, Chair, Wheelchair Transfer Contact Guard Assist   Bed Chair Wheelchair Transfer Description Adaptive equipment;Increased time;Set-up of equipment;Squat pivot transfer to wheelchair;Supervision for safety  (CGA from EOB > w/c, SBA from w/c to EOB)   Standing Upper Body Exercises   Standing Upper Body Exercises Yes   Other Exercises water bike at level 1 x 9 minutes, with 1 rest break at 5 minutes, CGA/SBA for standing balance   Bed Mobility    Supine to Sit Modified Independent   Sit to Supine Modified Independent   Scooting Independent   Interdisciplinary Plan of Care Collaboration   IDT Collaboration with  Physician   Patient Position at End of Therapy In Bed;Call Light within Reach;Tray Table within Reach;Phone within Reach   Collaboration Comments checked in with patient     Standing balance while tossing bean bags with L UE crossing midline and no R UE support with CGA/SBA. Patient required 1 rest break during first round of activity. Patient switched hands and tossed all bean bags the second round without a rest break.       Patient given a soccer ball to use with LE exercises while in bed for extra exercise outside of therapy sessions.     Assessment    Patient was able to stand for increased time as the session progressed. Patient tolerated water bike with level 1 resistance while standing well.    Strengths: Able to follow instructions, Effective communication skills, Motivated for self care and independence, Manages pain appropriately, Pleasant and cooperative, Supportive family, Willingly participates in therapeutic activities, Alert and oriented, Independent prior level of function  Barriers: Decreased endurance, Hemiparesis, Impaired activity tolerance, Impaired balance, Limited mobility    Plan    ADLs, IADLs, transfers, functional mobility, R UE AROM/strengthening, standing tolerance/balance    Occupational Therapy Goals (Active)       Problem: Bathing       Dates: Start: 02/28/23         Goal: STG-Within one week, patient will bathe with SBA using grab bars as needed.        Dates: Start: 02/28/23   Expected End: 03/11/23         Goal Note filed on 03/02/23 1346 by Juan Preston, OT/L       CGA                 Problem: Dressing       Dates: Start: 03/02/23         Goal: STG-Within one week, patient will dress LB with setup and supervision       Dates: Start: 03/02/23   Expected End: 03/11/23               Problem: Functional Transfers       Dates: Start: 03/02/23         Goal: STG-Within one week, patient will transfer to toilet with setup and supervision       Dates: Start: 03/02/23   Expected End: 03/11/23               Problem: OT Long Term Goals       Dates: Start: 02/28/23         Goal: LTG-By discharge, patient will complete basic self care tasks with supervision.       Dates: Start: 02/28/23   Expected End: 03/11/23            Goal: LTG-By discharge, patient will perform bathroom transfers with supervision.       Dates: Start: 02/28/23   Expected End: 03/11/23            Goal: LTG-By discharge, patient will  complete basic home management with supervision.        Dates: Start: 02/28/23   Expected End: 03/11/23               Problem: Toileting       Dates: Start: 03/02/23         Goal: STG-Within one week, patient will complete toileting tasks with setup and supervision       Dates: Start: 03/02/23   Expected End: 03/11/23

## 2023-03-07 NOTE — CARE PLAN
"The patient is Watcher - Medium risk of patient condition declining or worsening    Shift Goals  Clinical Goals: safety    Problem: Bladder / Voiding  Goal: Patient will establish and maintain bladder regimen  Outcome: Progressing     Problem: Fall Risk - Rehab  Goal: Patient will remain free from falls  Note: Mariluz Ball Fall risk Assessment Score: 12    Moderate fall risk Interventions  - Bed and strip alarm   - Yellow sign by the door   - Yellow wrist band \"Fall risk\"  - Room near to the nurse station  - Do not leave patient unattended in the bathroom  - Fall risk education provided     "

## 2023-03-07 NOTE — THERAPY
Occupational Therapy  Daily Treatment     Patient Name: Norm Prabhakar  Age:  42 y.o., Sex:  male  Medical Record #: [id number]  Today's Date: 3/7/2023     Precautions  Precautions: Fall Risk  Comments: seizure, R hemiparesis; possible conversion disorder         Subjective    Pt in bed upon arrival, agreeable to OT session.      Objective     03/07/23 1101   OT Charge Group   OT Therapeutic Exercise (Units) 2   OT Total Time Spent   OT Individual Total Time Spent (Mins) 30   Functional Level of Assist   Bed, Chair, Wheelchair Transfer Contact Guard Assist  (stand step EOB to w/c with FWW)   Bed Chair Wheelchair Transfer Description Adaptive equipment;Increased time;Initial preparation for task;Set-up of equipment;Supervision for safety;Verbal cueing   Sitting Upper Body Exercises   Front Arm Raise 2 sets of 10;Bilateral;Weight (See Comments for lbs)  (4# dowel)   Shoulder Press 2 sets of 10;Bilateral;Weight (See Comments for lbs)  (4# dowel)   Bilateral Row 3 sets of 10;Bilateral;Weight (See Comments for lbs)  (25#, 30#, 30# on Equalizer)   Bed Mobility    Supine to Sit Modified Independent   Interdisciplinary Plan of Care Collaboration   Patient Position at End of Therapy Seated;Chair Alarm On;Self Releasing Lap Belt Applied;Other (Comments)  (in cafeteria for lunch)       Assessment    Pt tolerated UE exercises well to progress strength and endurance for transfers and functional daily activities. Had no c/o pain or discomfort. Min cues for form.     Strengths: Able to follow instructions, Effective communication skills, Motivated for self care and independence, Manages pain appropriately, Pleasant and cooperative, Supportive family, Willingly participates in therapeutic activities, Alert and oriented, Independent prior level of function  Barriers: Decreased endurance, Hemiparesis, Impaired activity tolerance, Impaired balance, Limited mobility    Plan    ADLs, IADLs, transfers, functional mobility, R UE  AROM/strengthening, standing tolerance/balance    Pt requests dumbbell UE HEP, states he wants to get some dumbbells to use    Passport items to be completed:  Perform bathroom transfers, complete dressing, complete feeding, get ready for the day, prepare a simple meal, participate in household tasks, adapt home for safety needs, demonstrate home exercise program, complete caregiver training     Occupational Therapy Goals (Active)       Problem: Bathing       Dates: Start: 02/28/23         Goal: STG-Within one week, patient will bathe with SBA using grab bars as needed.        Dates: Start: 02/28/23   Expected End: 03/11/23         Goal Note filed on 03/02/23 1346 by Juan Preston, OT/L       CGA                 Problem: Dressing       Dates: Start: 03/02/23         Goal: STG-Within one week, patient will dress LB with setup and supervision       Dates: Start: 03/02/23   Expected End: 03/11/23               Problem: Functional Transfers       Dates: Start: 03/02/23         Goal: STG-Within one week, patient will transfer to toilet with setup and supervision       Dates: Start: 03/02/23   Expected End: 03/11/23               Problem: OT Long Term Goals       Dates: Start: 02/28/23         Goal: LTG-By discharge, patient will complete basic self care tasks with supervision.       Dates: Start: 02/28/23   Expected End: 03/11/23            Goal: LTG-By discharge, patient will perform bathroom transfers with supervision.       Dates: Start: 02/28/23   Expected End: 03/11/23            Goal: LTG-By discharge, patient will complete basic home management with supervision.        Dates: Start: 02/28/23   Expected End: 03/11/23               Problem: Toileting       Dates: Start: 03/02/23         Goal: STG-Within one week, patient will complete toileting tasks with setup and supervision       Dates: Start: 03/02/23   Expected End: 03/11/23

## 2023-03-07 NOTE — PROGRESS NOTES
"  Physical Medicine & Rehabilitation Progress Note    Encounter Date: 3/7/2023    Chief Complaint: Decreased mobility, right sided weakness    Interval Events (Subjective):  Patient sitting up in room. He reports he is encouraged that his biopsy was only thyroid tissue. He denies pain at rest. Denies NVD.  He reports he had low blood sugar yesterday afternoon so he drank extra sugar products and that's why his blood sugars went into 200s.     _____________________________________  Interdisciplinary Team Conference   Most recent IDT on 3/2/2023    Discharge Date/Disposition:  3/11/23  _____________________________________    Objective:  VITAL SIGNS: /60   Pulse 60   Temp 36.3 °C (97.3 °F)   Resp 18   Ht 1.981 m (6' 6\")   Wt 109 kg (240 lb)   SpO2 96%   BMI 27.73 kg/m²   Gen: NAD  Psych: Mood and affect appropriate  CV: RRR, 0 edema  Resp: CTAB, no upper airway sounds  Abd: NTND  Neuro: AOx4, following commands  Unchanged from 3/6/23    Laboratory Values:  Recent Results (from the past 72 hour(s))   POCT glucose device results    Collection Time: 03/04/23  5:13 PM   Result Value Ref Range    POC Glucose, Blood 94 65 - 99 mg/dL   POCT glucose device results    Collection Time: 03/04/23  9:50 PM   Result Value Ref Range    POC Glucose, Blood 91 65 - 99 mg/dL   POCT glucose device results    Collection Time: 03/05/23  7:39 AM   Result Value Ref Range    POC Glucose, Blood 75 65 - 99 mg/dL   POCT glucose device results    Collection Time: 03/05/23 11:15 AM   Result Value Ref Range    POC Glucose, Blood 100 (H) 65 - 99 mg/dL   POCT glucose device results    Collection Time: 03/05/23  5:09 PM   Result Value Ref Range    POC Glucose, Blood 84 65 - 99 mg/dL   POCT glucose device results    Collection Time: 03/05/23  9:40 PM   Result Value Ref Range    POC Glucose, Blood 83 65 - 99 mg/dL   POCT glucose device results    Collection Time: 03/06/23  7:32 AM   Result Value Ref Range    POC Glucose, Blood 68 65 - 99 " mg/dL   POCT glucose device results    Collection Time: 03/06/23  8:02 AM   Result Value Ref Range    POC Glucose, Blood 95 65 - 99 mg/dL   POCT glucose device results    Collection Time: 03/06/23  8:18 AM   Result Value Ref Range    POC Glucose, Blood 113 (H) 65 - 99 mg/dL   POCT glucose device results    Collection Time: 03/06/23 11:03 AM   Result Value Ref Range    POC Glucose, Blood 99 65 - 99 mg/dL   POCT glucose device results    Collection Time: 03/06/23  5:02 PM   Result Value Ref Range    POC Glucose, Blood 245 (H) 65 - 99 mg/dL   POCT glucose device results    Collection Time: 03/06/23  8:10 PM   Result Value Ref Range    POC Glucose, Blood 230 (H) 65 - 99 mg/dL   POCT glucose device results    Collection Time: 03/07/23  8:15 AM   Result Value Ref Range    POC Glucose, Blood 126 (H) 65 - 99 mg/dL   POCT glucose device results    Collection Time: 03/07/23 10:57 AM   Result Value Ref Range    POC Glucose, Blood 109 (H) 65 - 99 mg/dL       Medications:  Scheduled Medications   Medication Dose Frequency    insulin GLARGINE  20 Units BID    insulin lispro  2-12 Units 4X/DAY ACHS    metFORMIN  250 mg BID WITH MEALS    lurasidone  20 mg PM MEAL    senna-docusate  2 Tablet BID    omeprazole  20 mg DAILY    atorvastatin  20 mg Q EVENING    levothyroxine  200 mcg AM ES    levothyroxine  25 mcg AM ES    vitamin D3  4,000 Units Q EVENING    divalproex  1,500 mg QHS    fenofibrate micronized  134 mg DAILY    montelukast  10 mg Q EVENING    prazosin  5 mg Q EVENING    sertraline  150 mg QHS    levETIRAcetam  1,000 mg Q12HRS    divalproex  500 mg DAILY    loratadine  10 mg DAILY    topiramate  50 mg Q12HRS    traZODone  100 mg QHS    dorzolamide-timolol  1 Drop BID    budesonide-formoterol  2 Puff BID (RT)    enoxaparin (LOVENOX) injection  40 mg DAILY     PRN medications: [DISCONTINUED] insulin regular **AND** POC blood glucose manual result **AND** NOTIFY MD and PharmD **AND** Administer 20 grams of glucose  (approximately 8 ounces of fruit juice) every 15 minutes PRN FSBG less than 70 mg/dL **AND** dextrose bolus, Respiratory Therapy Consult, hydrALAZINE, acetaminophen, senna-docusate **AND** polyethylene glycol/lytes **AND** magnesium hydroxide **AND** bisacodyl, mag hydrox-al hydrox-simeth, ondansetron **OR** ondansetron, traZODone, sodium chloride, traMADol, midazolam, albuterol    Diet:  Current Diet Order   Procedures    Diet Order Diet: Consistent CHO (Diabetic)       Assessment:  Active Hospital Problems    Diagnosis     *Seizure disorder (HCC)     Acute right-sided weakness     Primary hypertension     Mixed hyperlipidemia     Type 2 diabetes mellitus without complication, without long-term current use of insulin (HCC)     PTSD (post-traumatic stress disorder)     Neck mass     Postoperative hypothyroidism     Anxiety and depression        Medical Decision Making and Plan:  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  -PT and OT for mobility and ADLs. Per guidelines, 15 hours per week between PT, OT and SLP.  -Follow-up APAP/Tramadol  -Continue Keppra and Depakote     HTN - Patient on Lisinopril 10 mg. Lisinopril discontinue for low SBP     DM2 with hyperglycemia - Patient on Lantus and SSI as well as metformin, Januvia. Blood sugars elevated, consult hospitalist. A1c 7.8. Ongoing hyperglycemia. Continue Lantus and SSI  -Lantus adjusted to 20 U BID. Discussed with hospitalist and will restart Metformin     HLD - Patient on Atorvastatin 20 mg QHS and Lofibra     Glaucoma - Patient on Cosopt      BPD/Mood disorder - Patient on Depakote 500/1500, Prazosin 5 mg daily, Sertraline 150 mg, Topamax 50 mg BID, Latuda 80 mg QHS and Trazodone 100 mg   -Consult Neuropsychology. Has history of suicide attempts per chart reviewed. Discussed case with Dr. Cervantes . Home dose is Latuda 20 mg, will  reduce. Continue 20 mg daily    Anemia - 13.9 on admission, repeat 3/2 - 13.1    Azotemia - BUN 28 on 3/2/23. Encourage PO fluids for now.     Hypothyroidism - Patient on 225 mcg of Levothyroxine. TSH elevated but T4 normal.      Asthma - Patient on Singulair, Claritin, and Symbicort     Pain - APAP/Tramadol PRN     Skin - Patient at risk for skin breakdown due to debility in areas including sacrum, achilles, elbows and head in addition to other sites. Nursing to assess skin daily.     GI Ppx - Patient on Prilosec for GERD prophylaxis. Patient on Senna-docusate for constipation prophylaxis.      DVT Ppx - Patient Lovenox on transfer.  ____________________________________    T. Benjamin Smith MD./PhD.  [facility] - Physical Medicine & Rehabilitation   [facility] - Brain Injury Medicine   ____________________________________  \

## 2023-03-07 NOTE — THERAPY
Physical Therapy   Daily Treatment     Patient Name: Norm Prabhakar  Age:  42 y.o., Sex:  male  Medical Record #: [id number]  Today's Date: 3/7/2023     Precautions  Precautions: Fall Risk  Comments: seizure, R hemiparesis; possible conversion disorder    Subjective    Pt received asleep in bed, easily awoken. Drowsy upon being woken but agreeable to participate.     Objective       03/07/23 0701   PT Charge Group   PT Gait Training (Units) 3   PT Therapeutic Exercise (Units) 1   PT Total Time Spent   PT Individual Total Time Spent (Mins) 60   Gait Functional Level of Assist    Gait Level Of Assist Contact Guard Assist   Assistive Device Front Wheel Walker   Distance (Feet) 70   # of Times Distance was Traveled 1  (plus 50 ft)   Deviation Decreased Base Of Support;Bradykinetic;Shuffled Gait;Decreased Heel Strike;Decreased Toe Off   Transfer Functional Level of Assist   Bed, Chair, Wheelchair Transfer Contact Guard Assist   Bed Chair Wheelchair Transfer Description Adaptive equipment;Increased time;Set-up of equipment;Supervision for safety;Verbal cueing  (stand step FWW)   Sitting Lower Body Exercises   Nustep Resistance Level 5  (x10 min BUE/LE at >60 spm for endurance training, 620 steps with 0 RB)   Bed Mobility    Supine to Sit Modified Independent   Sit to Stand Standby Assist   Scooting Modified Independent  (bed and wc)   10 Meter Walk Test   Normal - Trial 1 38 seconds   Normal - Trial 2 43 seconds   Normal - Trial 3 45 seconds   Normal Average Time 42 seconds   Normal Average Velocity (m/s) 0.14   Interdisciplinary Plan of Care Collaboration   IDT Collaboration with  Certified Nursing Assistant   Patient Position at End of Therapy Seated;Chair Alarm On;Self Releasing Lap Belt Applied;Call Light within Reach;Tray Table within Reach;Phone within Reach   Collaboration Comments CNA took vitals     Required set up A to don shirt and pants while std EOB d/t drowsiness.    Assessment    Pt cont to present with very  bradykinetic gait speed scoring an avg velocity of 0.14 m/s on 10 meter walk test. This places pt in the household ambulator category at an inc risk of falls. Would benefit from cont gait and endurance training to maximize safety and independence upon d/c home.    Strengths: Able to follow instructions, Alert and oriented, Effective communication skills, Independent prior level of function, Motivated for self care and independence, Pleasant and cooperative, Willingly participates in therapeutic activities  Barriers: Decreased endurance, Fatigue, Hemiparesis, Impaired activity tolerance, Impaired balance, Impaired functional cognition, Limited mobility (hx of TBI and CVA)    Plan    Progressive gait endurance and speed with FWW, standing tolerance, stairs/curb navigation, LE strengthening and endurance. Consider TENS during tx if back pain is limiting activity     Passport items to be completed:  Get in/out of bed safely, in/out of a vehicle, safely use mobility device, walk or wheel around home/community, navigate up and down stairs, show how to get up/down from the ground, ensure home is accessible, demonstrate HEP, complete caregiver training    Physical Therapy Problems (Active)       Problem: Mobility       Dates: Start: 02/28/23         Goal: STG-Within one week, patient will ambulate household distance 25 ft x2 with FWW and CGA.       Dates: Start: 02/28/23   Expected End: 03/11/23         Goal Note filed on 03/02/23 1342 by Elke Hernandez, PT       20 ft x2 with min A and wc follow              Goal: STG-Within one week, patient will ambulate up/down a curb with FWW and min A.       Dates: Start: 02/28/23   Expected End: 03/11/23         Goal Note filed on 03/02/23 1342 by Elke Hernandez, PT       Not yet assessed d/t safety concerns              Goal: STG-Within one week, patient will ambulate up/down flight of stairs with min A using BHR and RB prn.       Dates: Start: 02/28/23   Expected End: 03/11/23          Goal Note filed on 03/02/23 1342 by Elke Hernandez PT       Not yet assessed d/t safety concerns                 Problem: Mobility Transfers       Dates: Start: 02/28/23         Goal: STG-Within one week, patient will transfer bed to chair with FWW and SBA.       Dates: Start: 02/28/23   Expected End: 03/11/23         Goal Note filed on 03/02/23 1342 by Elke Hernandez PT       CGA                 Problem: PT-Long Term Goals       Dates: Start: 02/28/23         Goal: LTG-By discharge, patient will ambulate household distances at least 150 ft with FWW vs LRAD and SPV.       Dates: Start: 02/28/23   Expected End: 03/11/23            Goal: LTG-By discharge, patient will transfer one surface to another with FWW vs LRAD and SPV.       Dates: Start: 02/28/23   Expected End: 03/11/23            Goal: LTG-By discharge, patient will ambulate up/down flight of stairs with BHR and SPV requiring no RB.       Dates: Start: 02/28/23   Expected End: 03/11/23            Goal: LTG-By discharge, patient will transfer in/out of a car with FWW vs LRAD and set up A.       Dates: Start: 02/28/23   Expected End: 03/11/23

## 2023-03-07 NOTE — CARE PLAN
The patient is Stable - Low risk of patient condition declining or worsening    Shift Goals  Clinical Goals: safety  Patient Goals: sleep      Problem: Mobility  Goal: Patient's capacity to carry out activities will improve  Outcome: Progressing     Problem: Fall Risk - Rehab  Goal: Patient will remain free from falls  Outcome: Progressing

## 2023-03-07 NOTE — PROGRESS NOTES
"REHABILITATION PSYCHOLOGY FOLLOW-UP:  Reason for admission: Seizure disorder (HCC) [G40.909]  Length of Visit: 28 minutes    Chief Complaint: Adjustment to hospitalization    S: Met with the patient for brief individual psychotherapy. Patient presented with a euthymic affect and reported a \"doing okay\" mood.  Session focused on maximizing engagement with therapies for the purpose of safely returning home and being able to appropriately engage in instrumental activities of daily living.  We will continue to follow    O: Psychiatric Examination:  Vitals: Blood pressure 108/60, pulse 60, temperature 36.3 °C (97.3 °F), resp. rate 18, height 1.981 m (6' 6\"), weight 109 kg (240 lb), SpO2 96 %.  Musculoskeletal: Laying in bed normal psychomotor activity, no tics or unusual mannerisms noted  Appearance and Eye Contact: Easily established rapport WDWN, appropriate dress and grooming. Behavior is calm, cooperative,  appropriate eye contact  Attention/Alertness: Alert  Thought Process: Linear logical goal directed  Thought Content: No psychotic processes noted  Speech: Clear with normal rate and rhythm  Mood: \"Doing okay\"            Affect: Euthymic         SI/HI: Denies     Memory: Recent and remote memory appear intact      Orientation: Alert and oriented to person place and time  Insight into symptoms: Fair  Judgement into symptoms: Fair        ASSESSMENT: Norm Prabhakar is a 42 y.o. male with a PMH of TBI, seizures, conversion disorder, PTSD, BPD and right sided weakness with previous CVA who presented on 2/17/23 with multiple seizure. He reportedly had 4 separate seizures. CT head was negative for acute process. MRI was negative. He reports missed some of his medication at home. He was started on Keppra in addition to his home medications and has since not had a seizure in 10 days. He was found to have large neck mass and biopsy was performed with pending pathology.       Psychology was consulted due to significant " psychiatric history as well as difficulty with adjustment to hospitalization.  Psychology was consulted due to significant psychiatric history as well as unknown etiology of recent seizures.  Session focused on assessment of current functioning as well as psychoeducation regarding common cognitive and emotional impacts of hospitalization.  Patient reports recent struggles with depression and anxiety that have been longstanding but ongoing.  We will continue to follow through discharge to ensure appropriate engagement with therapies and medical staff.     DSM5 Diagnostic Considerations: Adjustment disorder with mixed anxiety and depressed mood        PLAN:  Records reviewed: Yes  Discussed patient with other provider: Luis  We will continue to follow  Thank you for the consult.     Gabrielle Cervantes, Ph.D.

## 2023-03-07 NOTE — THERAPY
Physical Therapy   Daily Treatment     Patient Name: Norm Prabhakar  Age:  42 y.o., Sex:  male  Medical Record #: [id number]  Today's Date: 3/7/2023     Precautions  Precautions: Fall Risk  Comments: seizure, R hemiparesis; possible conversion disorder    Subjective    Pt received asleep in bed, easily woken and agreeable to participate.     Objective       03/07/23 1431   PT Charge Group   PT Therapeutic Exercise (Units) 2   PT Total Time Spent   PT Individual Total Time Spent (Mins) 30   Transfer Functional Level of Assist   Bed, Chair, Wheelchair Transfer Standby Assist   Bed Chair Wheelchair Transfer Description Increased time;Set-up of equipment;Supervision for safety;Verbal cueing  (stand pivot wc<>bed no AD)   Standing Lower Body Exercises   Hamstring Curl 1 set of 10;Bilateral    Hip Extension 1 set of 10;Bilateral    Hip Abduction 1 set of 10;Bilateral   Marching 1 set of 10   Heel Rise 1 set of 10;Bilateral   Mini Squat Partial;2 sets of 10   Comments BUE support in // bars, 1 std RB   Bed Mobility    Supine to Sit Modified Independent   Sit to Supine Modified Independent   Sit to Stand Standby Assist   Scooting Modified Independent  (wc and bed)   Interdisciplinary Plan of Care Collaboration   Patient Position at End of Therapy In Bed;Call Light within Reach;Tray Table within Reach;Phone within Reach         Assessment    Pt with improving standing tolerance up to 8 min at a time during standing exercises. Bradykinetic but no c/o pain or discomfort.    Strengths: Able to follow instructions, Alert and oriented, Effective communication skills, Independent prior level of function, Motivated for self care and independence, Pleasant and cooperative, Willingly participates in therapeutic activities  Barriers: Decreased endurance, Fatigue, Hemiparesis, Impaired activity tolerance, Impaired balance, Impaired functional cognition, Limited mobility (hx of TBI and CVA)    Plan    Progressive gait endurance and speed  with FWW, standing tolerance, stairs/curb navigation, LE strengthening and endurance. Consider TENS during tx if back pain is limiting activity     Passport items to be completed:  Get in/out of bed safely, in/out of a vehicle, safely use mobility device, walk or wheel around home/community, navigate up and down stairs, show how to get up/down from the ground, ensure home is accessible, demonstrate HEP, complete caregiver training    Physical Therapy Problems (Active)       Problem: Mobility       Dates: Start: 02/28/23         Goal: STG-Within one week, patient will ambulate household distance 25 ft x2 with FWW and CGA.       Dates: Start: 02/28/23   Expected End: 03/11/23         Goal Note filed on 03/02/23 1342 by Elke Hernandez, PT       20 ft x2 with min A and wc follow              Goal: STG-Within one week, patient will ambulate up/down a curb with FWW and min A.       Dates: Start: 02/28/23   Expected End: 03/11/23         Goal Note filed on 03/02/23 1342 by Elke Hernandez, PT       Not yet assessed d/t safety concerns              Goal: STG-Within one week, patient will ambulate up/down flight of stairs with min A using BHR and RB prn.       Dates: Start: 02/28/23   Expected End: 03/11/23         Goal Note filed on 03/02/23 1342 by Elke Hernandez, PT       Not yet assessed d/t safety concerns                 Problem: Mobility Transfers       Dates: Start: 02/28/23         Goal: STG-Within one week, patient will transfer bed to chair with FWW and SBA.       Dates: Start: 02/28/23   Expected End: 03/11/23         Goal Note filed on 03/02/23 1342 by Elke Hernandez, PT       CGA                 Problem: PT-Long Term Goals       Dates: Start: 02/28/23         Goal: LTG-By discharge, patient will ambulate household distances at least 150 ft with FWW vs LRAD and SPV.       Dates: Start: 02/28/23   Expected End: 03/11/23            Goal: LTG-By discharge, patient will transfer one surface to another with FWW vs LRAD and  SPV.       Dates: Start: 02/28/23   Expected End: 03/11/23            Goal: LTG-By discharge, patient will ambulate up/down flight of stairs with BHR and SPV requiring no RB.       Dates: Start: 02/28/23   Expected End: 03/11/23            Goal: LTG-By discharge, patient will transfer in/out of a car with FWW vs LRAD and set up A.       Dates: Start: 02/28/23   Expected End: 03/11/23

## 2023-03-08 ENCOUNTER — APPOINTMENT (OUTPATIENT)
Dept: PHYSICAL THERAPY | Facility: REHABILITATION | Age: 43
DRG: 101 | End: 2023-03-08
Attending: PHYSICAL MEDICINE & REHABILITATION
Payer: MEDICAID

## 2023-03-08 ENCOUNTER — APPOINTMENT (OUTPATIENT)
Dept: OCCUPATIONAL THERAPY | Facility: REHABILITATION | Age: 43
DRG: 101 | End: 2023-03-08
Attending: PHYSICAL MEDICINE & REHABILITATION
Payer: MEDICAID

## 2023-03-08 ENCOUNTER — APPOINTMENT (OUTPATIENT)
Dept: SPEECH THERAPY | Facility: REHABILITATION | Age: 43
DRG: 101 | End: 2023-03-08
Attending: PHYSICAL MEDICINE & REHABILITATION
Payer: MEDICAID

## 2023-03-08 LAB
GLUCOSE BLD STRIP.AUTO-MCNC: 117 MG/DL (ref 65–99)
GLUCOSE BLD STRIP.AUTO-MCNC: 123 MG/DL (ref 65–99)
GLUCOSE BLD STRIP.AUTO-MCNC: 91 MG/DL (ref 65–99)
GLUCOSE BLD STRIP.AUTO-MCNC: 93 MG/DL (ref 65–99)

## 2023-03-08 PROCEDURE — 700111 HCHG RX REV CODE 636 W/ 250 OVERRIDE (IP): Performed by: PHYSICAL MEDICINE & REHABILITATION

## 2023-03-08 PROCEDURE — 99232 SBSQ HOSP IP/OBS MODERATE 35: CPT | Performed by: PHYSICAL MEDICINE & REHABILITATION

## 2023-03-08 PROCEDURE — 82962 GLUCOSE BLOOD TEST: CPT

## 2023-03-08 PROCEDURE — 97112 NEUROMUSCULAR REEDUCATION: CPT

## 2023-03-08 PROCEDURE — 97535 SELF CARE MNGMENT TRAINING: CPT

## 2023-03-08 PROCEDURE — 97530 THERAPEUTIC ACTIVITIES: CPT

## 2023-03-08 PROCEDURE — A9270 NON-COVERED ITEM OR SERVICE: HCPCS | Performed by: PHYSICAL MEDICINE & REHABILITATION

## 2023-03-08 PROCEDURE — 97129 THER IVNTJ 1ST 15 MIN: CPT

## 2023-03-08 PROCEDURE — 770010 HCHG ROOM/CARE - REHAB SEMI PRIVAT*

## 2023-03-08 PROCEDURE — 94760 N-INVAS EAR/PLS OXIMETRY 1: CPT

## 2023-03-08 PROCEDURE — 99232 SBSQ HOSP IP/OBS MODERATE 35: CPT | Performed by: HOSPITALIST

## 2023-03-08 PROCEDURE — 700102 HCHG RX REV CODE 250 W/ 637 OVERRIDE(OP): Performed by: PHYSICAL MEDICINE & REHABILITATION

## 2023-03-08 PROCEDURE — 97116 GAIT TRAINING THERAPY: CPT

## 2023-03-08 PROCEDURE — 97130 THER IVNTJ EA ADDL 15 MIN: CPT

## 2023-03-08 PROCEDURE — 94640 AIRWAY INHALATION TREATMENT: CPT

## 2023-03-08 RX ADMIN — ENOXAPARIN SODIUM 40 MG: 40 INJECTION SUBCUTANEOUS at 08:19

## 2023-03-08 RX ADMIN — FENOFIBRATE 134 MG: 67 CAPSULE ORAL at 08:18

## 2023-03-08 RX ADMIN — PRAZOSIN HYDROCHLORIDE 5 MG: 5 CAPSULE ORAL at 20:49

## 2023-03-08 RX ADMIN — DIVALPROEX SODIUM 1500 MG: 500 TABLET, DELAYED RELEASE ORAL at 20:49

## 2023-03-08 RX ADMIN — DIVALPROEX SODIUM 500 MG: 500 TABLET, DELAYED RELEASE ORAL at 08:19

## 2023-03-08 RX ADMIN — DORZOLAMIDE HYDROCHLORIDE AND TIMOLOL MALEATE 1 DROP: 20; 5 SOLUTION/ DROPS OPHTHALMIC at 20:49

## 2023-03-08 RX ADMIN — BUDESONIDE AND FORMOTEROL FUMARATE DIHYDRATE 2 PUFF: 160; 4.5 AEROSOL RESPIRATORY (INHALATION) at 20:48

## 2023-03-08 RX ADMIN — ATORVASTATIN CALCIUM 20 MG: 10 TABLET, FILM COATED ORAL at 20:49

## 2023-03-08 RX ADMIN — OMEPRAZOLE 20 MG: 20 CAPSULE, DELAYED RELEASE ORAL at 08:19

## 2023-03-08 RX ADMIN — SERTRALINE HYDROCHLORIDE 150 MG: 50 TABLET, FILM COATED ORAL at 20:49

## 2023-03-08 RX ADMIN — BUDESONIDE AND FORMOTEROL FUMARATE DIHYDRATE 2 PUFF: 160; 4.5 AEROSOL RESPIRATORY (INHALATION) at 11:10

## 2023-03-08 RX ADMIN — DORZOLAMIDE HYDROCHLORIDE AND TIMOLOL MALEATE 1 DROP: 20; 5 SOLUTION/ DROPS OPHTHALMIC at 08:18

## 2023-03-08 RX ADMIN — LEVOTHYROXINE SODIUM 25 MCG: 0.03 TABLET ORAL at 05:18

## 2023-03-08 RX ADMIN — LURASIDONE HYDROCHLORIDE 20 MG: 20 TABLET, FILM COATED ORAL at 17:54

## 2023-03-08 RX ADMIN — TOPIRAMATE 50 MG: 25 TABLET, FILM COATED ORAL at 08:18

## 2023-03-08 RX ADMIN — TOPIRAMATE 50 MG: 25 TABLET, FILM COATED ORAL at 20:50

## 2023-03-08 RX ADMIN — LEVETIRACETAM 1000 MG: 500 TABLET, FILM COATED ORAL at 20:49

## 2023-03-08 RX ADMIN — Medication 4000 UNITS: at 20:51

## 2023-03-08 RX ADMIN — LEVOTHYROXINE SODIUM 200 MCG: 0.12 TABLET ORAL at 05:18

## 2023-03-08 RX ADMIN — LEVETIRACETAM 1000 MG: 500 TABLET, FILM COATED ORAL at 08:19

## 2023-03-08 RX ADMIN — TRAZODONE HYDROCHLORIDE 100 MG: 100 TABLET ORAL at 20:50

## 2023-03-08 RX ADMIN — LORATADINE 10 MG: 10 TABLET ORAL at 08:19

## 2023-03-08 RX ADMIN — MONTELUKAST 10 MG: 10 TABLET, FILM COATED ORAL at 20:50

## 2023-03-08 ASSESSMENT — GAIT ASSESSMENTS
DEVIATION: BRADYKINETIC;DECREASED HEEL STRIKE;DECREASED TOE OFF
GAIT LEVEL OF ASSIST: CONTACT GUARD ASSIST
DISTANCE (FEET): 120
ASSISTIVE DEVICE: FRONT WHEEL WALKER
DEVIATION: DECREASED BASE OF SUPPORT;BRADYKINETIC;SHUFFLED GAIT;DECREASED HEEL STRIKE;DECREASED TOE OFF
ASSISTIVE DEVICE: FRONT WHEEL WALKER
GAIT LEVEL OF ASSIST: CONTACT GUARD ASSIST
DISTANCE (FEET): 30

## 2023-03-08 ASSESSMENT — ACTIVITIES OF DAILY LIVING (ADL)
BED_CHAIR_WHEELCHAIR_TRANSFER_DESCRIPTION: ADAPTIVE EQUIPMENT
BED_CHAIR_WHEELCHAIR_TRANSFER_DESCRIPTION: ADAPTIVE EQUIPMENT;INCREASED TIME;SET-UP OF EQUIPMENT;SUPERVISION FOR SAFETY;VERBAL CUEING

## 2023-03-08 ASSESSMENT — ENCOUNTER SYMPTOMS
VOMITING: 0
PALPITATIONS: 0
FEVER: 0
HALLUCINATIONS: 0
DIZZINESS: 0
HEADACHES: 0
BLURRED VISION: 0
NAUSEA: 0
SHORTNESS OF BREATH: 0

## 2023-03-08 ASSESSMENT — PAIN DESCRIPTION - PAIN TYPE: TYPE: ACUTE PAIN

## 2023-03-08 NOTE — PROGRESS NOTES
"  Physical Medicine & Rehabilitation Progress Note    Encounter Date: 3/8/2023    Chief Complaint: Decreased mobility, right sided weakness    Interval Events (Subjective):  Patient sitting up in room. He reports he is doing well. He did have some low HR today. Discussed about monitoring for low BP. His blood sugars are much better today. Denies low blood pressure. Denies NVD.     _____________________________________  Interdisciplinary Team Conference   Most recent IDT on 3/2/2023    Discharge Date/Disposition:  3/11/23  _____________________________________    Objective:  VITAL SIGNS: /65   Pulse (!) 46   Temp 36.8 °C (98.2 °F)   Resp 18   Ht 1.981 m (6' 6\")   Wt 109 kg (240 lb)   SpO2 98%   BMI 27.73 kg/m²   Gen: NAD  Psych: Mood and affect appropriate  CV: RRR, 0 edema  Resp: CTAB, no upper airway sounds  Abd: NTND  Neuro: AOx4, following commands    Laboratory Values:  Recent Results (from the past 72 hour(s))   POCT glucose device results    Collection Time: 03/05/23  5:09 PM   Result Value Ref Range    POC Glucose, Blood 84 65 - 99 mg/dL   POCT glucose device results    Collection Time: 03/05/23  9:40 PM   Result Value Ref Range    POC Glucose, Blood 83 65 - 99 mg/dL   POCT glucose device results    Collection Time: 03/06/23  7:32 AM   Result Value Ref Range    POC Glucose, Blood 68 65 - 99 mg/dL   POCT glucose device results    Collection Time: 03/06/23  8:02 AM   Result Value Ref Range    POC Glucose, Blood 95 65 - 99 mg/dL   POCT glucose device results    Collection Time: 03/06/23  8:18 AM   Result Value Ref Range    POC Glucose, Blood 113 (H) 65 - 99 mg/dL   POCT glucose device results    Collection Time: 03/06/23 11:03 AM   Result Value Ref Range    POC Glucose, Blood 99 65 - 99 mg/dL   POCT glucose device results    Collection Time: 03/06/23  5:02 PM   Result Value Ref Range    POC Glucose, Blood 245 (H) 65 - 99 mg/dL   POCT glucose device results    Collection Time: 03/06/23  8:10 PM "   Result Value Ref Range    POC Glucose, Blood 230 (H) 65 - 99 mg/dL   POCT glucose device results    Collection Time: 03/07/23  8:15 AM   Result Value Ref Range    POC Glucose, Blood 126 (H) 65 - 99 mg/dL   POCT glucose device results    Collection Time: 03/07/23 10:57 AM   Result Value Ref Range    POC Glucose, Blood 109 (H) 65 - 99 mg/dL   POCT glucose device results    Collection Time: 03/07/23  5:04 PM   Result Value Ref Range    POC Glucose, Blood 85 65 - 99 mg/dL   POCT glucose device results    Collection Time: 03/07/23  8:03 PM   Result Value Ref Range    POC Glucose, Blood 90 65 - 99 mg/dL   POCT glucose device results    Collection Time: 03/08/23  7:34 AM   Result Value Ref Range    POC Glucose, Blood 93 65 - 99 mg/dL   POCT glucose device results    Collection Time: 03/08/23  9:00 AM   Result Value Ref Range    POC Glucose, Blood 123 (H) 65 - 99 mg/dL       Medications:  Scheduled Medications   Medication Dose Frequency    insulin GLARGINE  20 Units BID    insulin lispro  2-12 Units 4X/DAY ACHS    lurasidone  20 mg PM MEAL    senna-docusate  2 Tablet BID    omeprazole  20 mg DAILY    atorvastatin  20 mg Q EVENING    levothyroxine  200 mcg AM ES    levothyroxine  25 mcg AM ES    vitamin D3  4,000 Units Q EVENING    divalproex  1,500 mg QHS    fenofibrate micronized  134 mg DAILY    montelukast  10 mg Q EVENING    prazosin  5 mg Q EVENING    sertraline  150 mg QHS    levETIRAcetam  1,000 mg Q12HRS    divalproex  500 mg DAILY    loratadine  10 mg DAILY    topiramate  50 mg Q12HRS    traZODone  100 mg QHS    dorzolamide-timolol  1 Drop BID    budesonide-formoterol  2 Puff BID (RT)    enoxaparin (LOVENOX) injection  40 mg DAILY     PRN medications: [DISCONTINUED] insulin regular **AND** POC blood glucose manual result **AND** NOTIFY MD and PharmD **AND** Administer 20 grams of glucose (approximately 8 ounces of fruit juice) every 15 minutes PRN FSBG less than 70 mg/dL **AND** dextrose bolus, Respiratory  Therapy Consult, hydrALAZINE, acetaminophen, senna-docusate **AND** polyethylene glycol/lytes **AND** magnesium hydroxide **AND** bisacodyl, mag hydrox-al hydrox-simeth, ondansetron **OR** ondansetron, traZODone, sodium chloride, traMADol, midazolam, albuterol    Diet:  Current Diet Order   Procedures    Diet Order Diet: Consistent CHO (Diabetic)       Assessment:  Active Hospital Problems    Diagnosis     *Seizure disorder (HCC)     Acute right-sided weakness     Primary hypertension     Mixed hyperlipidemia     Type 2 diabetes mellitus without complication, without long-term current use of insulin (HCC)     PTSD (post-traumatic stress disorder)     Neck mass     Postoperative hypothyroidism     Anxiety and depression        Medical Decision Making and Plan:  Santosh's Paralysis vs Conversion disorder - Patient has a history of conversion disorder and multiple hospitalizations, but reportedly seizures were witnessed after missing doses.  -PT and OT for mobility and ADLs. Per guidelines, 15 hours per week between PT, OT and SLP.  -Follow-up APAP/Tramadol  -Continue Keppra and Depakote     HTN - Patient on Lisinopril 10 mg. Lisinopril discontinue for low SBP  -Bradycardia on 3/8, will check Magnesium     DM2 with hyperglycemia - Patient on Lantus and SSI as well as metformin, Januvia. Blood sugars elevated, consult hospitalist. A1c 7.8. Ongoing hyperglycemia. Continue Lantus and SSI  -Lantus adjusted to 20 U BID. Discussed with hospitalist and will restart Metformin. Had low blood sugars on 3/7, discussed with hospitalist and discontinue. Will continue to monitor     HLD - Patient on Atorvastatin 20 mg QHS and Lofibra     Glaucoma - Patient on Cosopt      BPD/Mood disorder - Patient on Depakote 500/1500, Prazosin 5 mg daily, Sertraline 150 mg, Topamax 50 mg BID, Latuda 80 mg QHS and Trazodone 100 mg   -Consult Neuropsychology. Has history of suicide attempts per chart reviewed. Discussed case with Dr. Cervantes . Home dose  is Latuda 20 mg, will reduce. Continue 20 mg daily    Anemia - 13.9 on admission, repeat 3/2 - 13.1. Repeat 3/9    Azotemia - BUN 28 on 3/2/23. Encourage PO fluids for now. Repeat 3/9    Hypothyroidism - Patient on 225 mcg of Levothyroxine. TSH elevated but T4 normal.      Asthma - Patient on Singulair, Claritin, and Symbicort     Pain - APAP/Tramadol PRN     Skin - Patient at risk for skin breakdown due to debility in areas including sacrum, achilles, elbows and head in addition to other sites. Nursing to assess skin daily.     GI Ppx - Patient on Prilosec for GERD prophylaxis. Patient on Senna-docusate for constipation prophylaxis.      DVT Ppx - Patient Lovenox on transfer.  ____________________________________    T. Benjamin Smith MD./PhD.  [facility] - Physical Medicine & Rehabilitation   [facility] - Brain Injury Medicine   ____________________________________  \

## 2023-03-08 NOTE — CARE PLAN
The patient is Stable - Low risk of patient condition declining or worsening      Problem: Skin Integrity  Goal: Patient's skin integrity will be maintained or improve  Outcome: Progressing     Problem: Mobility  Goal: Patient's capacity to carry out activities will improve  Outcome: Progressing     Problem: Fall Risk - Rehab  Goal: Patient will remain free from falls  Outcome: Progressing

## 2023-03-08 NOTE — THERAPY
Physical Therapy   Daily Treatment     Patient Name: Norm Prabhakar  Age:  42 y.o., Sex:  male  Medical Record #: [id number]  Today's Date: 3/8/2023     Precautions  Precautions: Fall Risk  Comments: seizure, R hemiparesis; possible conversion disorder    Subjective    Pt received up in wc at room door, agreeable to participate. No c/o back pain.     Objective       03/08/23 1031   PT Charge Group   PT Gait Training (Units) 2   PT Total Time Spent   PT Individual Total Time Spent (Mins) 30   Gait Functional Level of Assist    Gait Level Of Assist Contact Guard Assist   Assistive Device Front Wheel Walker   Distance (Feet) 120   # of Times Distance was Traveled 2  (plus 100 ft)   Deviation Decreased Base Of Support;Bradykinetic;Shuffled Gait;Decreased Heel Strike;Decreased Toe Off  (VC for inc gait speed with minimal carryover)   Transfer Functional Level of Assist   Bed, Chair, Wheelchair Transfer Standby Assist   Bed Chair Wheelchair Transfer Description Adaptive equipment;Increased time;Set-up of equipment;Supervision for safety;Verbal cueing  (stand step no AD and squat pivot wc>bed no AD)   Bed Mobility    Sit to Supine Modified Independent   Sit to Stand Standby Assist   Scooting Modified Independent  (wc and bed)   Interdisciplinary Plan of Care Collaboration   Patient Position at End of Therapy In Bed;Call Light within Reach;Tray Table within Reach;Phone within Reach         Assessment    Pt with improving gait distance although still presents with bradykinetic gait speed for his age. Would benefit from cont progression of standing/ gait endurance.    Strengths: Able to follow instructions, Alert and oriented, Effective communication skills, Independent prior level of function, Motivated for self care and independence, Pleasant and cooperative, Willingly participates in therapeutic activities  Barriers: Decreased endurance, Fatigue, Hemiparesis, Impaired activity tolerance, Impaired balance, Impaired  functional cognition, Limited mobility (hx of TBI and CVA)    Plan    Progressive gait endurance and speed with FWW, standing tolerance, stairs/curb navigation, LE strengthening and endurance. Consider TENS during tx if back pain is limiting activity     Passport items to be completed:  Get in/out of bed safely, in/out of a vehicle, safely use mobility device, walk or wheel around home/community, navigate up and down stairs, show how to get up/down from the ground, ensure home is accessible, demonstrate HEP, complete caregiver training    Physical Therapy Problems (Active)       Problem: Mobility       Dates: Start: 02/28/23         Goal: STG-Within one week, patient will ambulate household distance 25 ft x2 with FWW and CGA.       Dates: Start: 02/28/23   Expected End: 03/11/23         Goal Note filed on 03/02/23 1342 by Elke Hernandez, PT       20 ft x2 with min A and wc follow              Goal: STG-Within one week, patient will ambulate up/down a curb with FWW and min A.       Dates: Start: 02/28/23   Expected End: 03/11/23         Goal Note filed on 03/02/23 1342 by Elke Hernandez, PT       Not yet assessed d/t safety concerns              Goal: STG-Within one week, patient will ambulate up/down flight of stairs with min A using BHR and RB prn.       Dates: Start: 02/28/23   Expected End: 03/11/23         Goal Note filed on 03/02/23 1342 by Elke Hernandez, PT       Not yet assessed d/t safety concerns                 Problem: Mobility Transfers       Dates: Start: 02/28/23         Goal: STG-Within one week, patient will transfer bed to chair with FWW and SBA.       Dates: Start: 02/28/23   Expected End: 03/11/23         Goal Note filed on 03/02/23 1342 by Elke Hernandez, PT       CGA                 Problem: PT-Long Term Goals       Dates: Start: 02/28/23         Goal: LTG-By discharge, patient will ambulate household distances at least 150 ft with FWW vs LRAD and SPV.       Dates: Start: 02/28/23   Expected End:  03/11/23            Goal: LTG-By discharge, patient will transfer one surface to another with FWW vs LRAD and SPV.       Dates: Start: 02/28/23   Expected End: 03/11/23            Goal: LTG-By discharge, patient will ambulate up/down flight of stairs with BHR and SPV requiring no RB.       Dates: Start: 02/28/23   Expected End: 03/11/23            Goal: LTG-By discharge, patient will transfer in/out of a car with FWW vs LRAD and set up A.       Dates: Start: 02/28/23   Expected End: 03/11/23

## 2023-03-08 NOTE — PROGRESS NOTES
Hospital Medicine Daily Progress Note        Chief Complaint  Hypertension  Diabetes    Interval Problem Update  Discussed about his BS lower recently and will decrease his Glargine to qam.    Review of Systems  Review of Systems   Constitutional:  Negative for fever.   Eyes:  Negative for blurred vision.   Respiratory:  Negative for shortness of breath.    Cardiovascular:  Negative for palpitations.   Gastrointestinal:  Negative for nausea and vomiting.   Neurological:  Negative for dizziness and headaches.   Psychiatric/Behavioral:  Negative for hallucinations.       Physical Exam  Temp:  [36.3 °C (97.3 °F)-36.8 °C (98.2 °F)] 36.8 °C (98.2 °F)  Pulse:  [60-68] 60  Resp:  [16-18] 16  BP: (104-107)/(62-68) 105/65  SpO2:  [93 %-97 %] 93 %    Physical Exam  Vitals and nursing note reviewed.   Constitutional:       General: He is not in acute distress.  HENT:      Mouth/Throat:      Mouth: Mucous membranes are moist.      Pharynx: Oropharynx is clear.   Eyes:      General: No scleral icterus.  Cardiovascular:      Rate and Rhythm: Normal rate and regular rhythm.      Heart sounds: No murmur heard.  Pulmonary:      Effort: Pulmonary effort is normal.      Breath sounds: Normal breath sounds. No stridor.   Abdominal:      General: There is no distension.      Palpations: Abdomen is soft.      Tenderness: There is no abdominal tenderness.   Musculoskeletal:      Cervical back: No rigidity.      Right lower leg: No edema.      Left lower leg: No edema.   Skin:     General: Skin is warm and dry.      Findings: No rash.   Neurological:      Mental Status: He is alert and oriented to person, place, and time.   Psychiatric:         Mood and Affect: Mood normal.         Behavior: Behavior normal.       Fluids    Intake/Output Summary (Last 24 hours) at 3/8/2023 0813  Last data filed at 3/7/2023 2000  Gross per 24 hour   Intake 780 ml   Output 1900 ml   Net -1120 ml         Laboratory                             Assessment/Plan  * Seizure disorder (HCC)- (present on admission)  Assessment & Plan  On Depakote and Keppra    Azotemia  Assessment & Plan  Bun: 28 --> 23  Encouraging fluid intake  Monitor    Hypothyroidism- (present on admission)  Assessment & Plan  Hx thyroidectomy  TSH: 7.55  FT4: 1.16  ? Subclinical   On Synthroid  Needs repeat TFT's as an outpatient    Primary hypertension- (present on admission)  Assessment & Plan  BP ok  Off Lisinopril  On Minipress  Note: on Prazosin  Monitor    Mixed hyperlipidemia- (present on admission)  Assessment & Plan  On Lipitor  On Fenofibrate    Type 2 diabetes mellitus without complication, without long-term current use of insulin (HCC)- (present on admission)  Assessment & Plan  HbaA1c: 7.8 (2/28)  BS this am: 93 (3/8)  Note: pt did not get Glargine last night on 3/7 (had low BS)  Off Metformin  On Lantus bid --> will decrease to 20 units qam   Off Metformin  Note: home meds include Lantus, Farxiga, Januvia, Metformin 1000 mg bid, Humalog 12 units at meals  Cont to monitor    Psychiatric problem- (present on admission)  Assessment & Plan  On Zoloft,Topamax, Latuda    Glaucoma- (present on admission)  Assessment & Plan  On Cosopt    Asthma- (present on admission)  Assessment & Plan  On Symbicort and Singulair

## 2023-03-08 NOTE — FLOWSHEET NOTE
03/08/23 1110   Events/Summary/Plan   Events/Summary/Plan symbicort given   Vital Signs   Pulse (!) 46   Respiration 18   Pulse Oximetry 98 %   $ Pulse Oximetry (Spot Check) Yes   Respiratory Assessment   Level of Consciousness Alert   Chest Exam   Work Of Breathing / Effort Within Normal Limits

## 2023-03-08 NOTE — THERAPY
Speech Language Pathology  Daily Treatment     Patient Name: Norm Prabhakar  Age:  42 y.o., Sex:  male  Medical Record #: [id number]  Today's Date: 3/8/2023     Precautions  Precautions: Fall Risk  Comments: seizure, R hemiparesis; possible conversion disorder    Subjective    Patient in bed, undressed.  Patient reported that he didn't sleep well last night because his bed alarm repeatedly sounded.   Patient agreeable to therapy at bedside.     Objective       03/08/23 0731   Treatment Charges   SLP Cognitive Skill Development First 15 Minutes 1   SLP Cognitive Skill Development Additional 15 Minutes 1   SLP Total Time Spent   SLP Individual Total Time Spent (Mins) 30   Cognition   Moderate Attention Minimal (4)   Functional Memory Activities Minimal (4)   Functional Problem Solving Minimal (4)   Functional Math / Financial Management Moderate (3)   Functional Level of Assist   Comprehension Supervision   Comprehension Description Glasses;Verbal cues   Expression Modified Independent   Expression Description Other (comment)  (extra time)   Social Interaction Modified Independent   Social Interaction Description Medication   Problem Solving Minimal Assist   Memory Minimal Assist         Assessment    Patient worked on attention in the context of functional word problems for time and money.  Patient needed extra time to process.  He completed 4/6 correctly 66% with min to mod cues to improve to 100%    Strengths: Able to follow instructions, Alert and oriented, Supportive family, Willingly participates in therapeutic activities  Barriers: Impaired carryover of learning, Impaired insight/denial of deficits, Impaired functional cognition    Plan    Target attention, functional math/ money math, recall.    Passport items to be completed:  Express basic needs, understand food/liquid recommendations, consistently follow swallow precautions, manage finances, manage medications, arrive to therapy appointments on time,  complete daily memory log entries, solve problems related to safety situations, review education related to hospitalization, complete caregiver training     Speech Therapy Problems (Active)       Problem: Memory STGs       Dates: Start: 02/28/23         Goal: STG-Within one week, patient will recall new training, daily events with 75% acc with min A.       Dates: Start: 02/28/23   Expected End: 03/11/23         Goal Note filed on 03/02/23 1314 by Lulu Leija MS,CCC-SLP       Min to mod cues for recall of new training, with low attention to new training.              Goal: STG-Within one week, patient will       Dates: Start: 02/28/23   Expected End: 03/11/23               Problem: Speech/Swallowing LTGs       Dates: Start: 02/28/23         Goal: LTG-By discharge, patient will solve basic problems and recall safety training with 80% acc with min A.       Dates: Start: 02/28/23   Expected End: 03/11/23

## 2023-03-08 NOTE — THERAPY
Occupational Therapy  Daily Treatment     Patient Name: Norm Prabhakar  Age:  42 y.o., Sex:  male  Medical Record #: [id number]  Today's Date: 3/8/2023     Precautions  Precautions: Fall Risk  Comments: seizure, R hemiparesis; possible conversion disorder         Subjective    Patient was lying in bed receiving meds form nursing upon arrival. Patient was agreeable to OT session.      Objective       03/08/23 0901   OT Charge Group   OT Self Care / ADL (Units) 4   OT Total Time Spent   OT Individual Total Time Spent (Mins) 60   Cosignature   Documentation Review Approved as originally documented and filed.   Precautions   Precautions Fall Risk   Comments seizure, R hemiparesis; possible conversion disorder   Functional Level of Assist   Grooming Supervision   Grooming Description Increased time;Set-up of equipment;Seated in wheelchair at sink;Supervision for safety;Verbal cueing  (vc for throughness when shaving, brushed teeth and brushed hair with mod I)   Upper Body Dressing Modified Independent  (EOB)   Lower Body Dressing Supervision   Lower Body Dressing Description Assistive devices;Supervision for safety  (FWW EOB)   Bed, Chair, Wheelchair Transfer Standby Assist   Bed Chair Wheelchair Transfer Description Squat pivot transfer to wheelchair;Set-up of equipment;Increased time;Supervision for safety;Verbal cueing   Bed Mobility    Supine to Sit Modified Independent   Scooting Modified Independent   Interdisciplinary Plan of Care Collaboration   IDT Collaboration with  Nursing   Patient Position at End of Therapy Seated;Chair Alarm On;Call Light within Reach;Tray Table within Reach;Phone within Reach     Patient given/reviewed HEP with various UE exercises d/t time patient did not complete the exercises.      Assessment    Patient tolerated session well. Patient completed dressing and grooming with supervision/mod I this session. Patient spent increased time during shaving for thoroughness.    Strengths: Able to  follow instructions, Effective communication skills, Motivated for self care and independence, Manages pain appropriately, Pleasant and cooperative, Supportive family, Willingly participates in therapeutic activities, Alert and oriented, Independent prior level of function  Barriers: Decreased endurance, Hemiparesis, Impaired activity tolerance, Impaired balance, Limited mobility    Plan    Review and complete HEP. ADLs, IADLs, transfers, functional mobility, R UE AROM/strengthening, standing tolerance/balance       Occupational Therapy Goals (Active)       Problem: Bathing       Dates: Start: 02/28/23         Goal: STG-Within one week, patient will bathe with SBA using grab bars as needed.        Dates: Start: 02/28/23   Expected End: 03/11/23         Goal Note filed on 03/02/23 1346 by Juan Preston, OT/L       CGA                 Problem: Dressing       Dates: Start: 03/02/23         Goal: STG-Within one week, patient will dress LB with setup and supervision       Dates: Start: 03/02/23   Expected End: 03/11/23               Problem: Functional Transfers       Dates: Start: 03/02/23         Goal: STG-Within one week, patient will transfer to toilet with setup and supervision       Dates: Start: 03/02/23   Expected End: 03/11/23               Problem: OT Long Term Goals       Dates: Start: 02/28/23         Goal: LTG-By discharge, patient will complete basic self care tasks with supervision.       Dates: Start: 02/28/23   Expected End: 03/11/23            Goal: LTG-By discharge, patient will perform bathroom transfers with supervision.       Dates: Start: 02/28/23   Expected End: 03/11/23            Goal: LTG-By discharge, patient will complete basic home management with supervision.        Dates: Start: 02/28/23   Expected End: 03/11/23               Problem: Toileting       Dates: Start: 03/02/23         Goal: STG-Within one week, patient will complete toileting tasks with setup and supervision       Dates:  Start: 03/02/23   Expected End: 03/11/23

## 2023-03-08 NOTE — CARE PLAN
"  Problem: Diabetes Management  Goal: Patient's ability to maintain appropriate glucose levels will be maintained or improve  Outcome: Progressing  Patient has order for ACHS finger sticks to regulate blood sugar.            Problem: Fall Risk - Rehab  Goal: Patient will remain free from falls  Outcome: Progressing   Mariluz Ball Fall risk Assessment Score: 12        Moderate fall risk Interventions  - Bed and strip alarm   - Yellow sign by the door   - Yellow wrist band \"Fall risk\"  - Room near to the nurse station  - Do not leave patient unattended in the bathroom  - Fall risk education provided                "

## 2023-03-08 NOTE — CARE PLAN
"The patient is Watcher - Medium risk of patient condition declining or worsening    Shift Goals  Clinical Goals: Safety  Patient Goals: Rest/Sleep      Problem: Psychosocial  Goal: Patient's level of anxiety will decrease  Outcome: Progressing     Patient has been calm and expressing needs appropriately.    Problem: Fall Risk - Rehab  Goal: Patient will remain free from falls  Outcome: Progressing     Mariluz Ball Fall risk Assessment Score: 12    Moderate fall risk Interventions  - Bed and strip alarm   - Yellow sign by the door   - Yellow wrist band \"Fall risk\"  - Room near to the nurse station  - Do not leave patient unattended in the bathroom  - Fall risk education provided      "

## 2023-03-09 ENCOUNTER — APPOINTMENT (OUTPATIENT)
Dept: SPEECH THERAPY | Facility: REHABILITATION | Age: 43
DRG: 101 | End: 2023-03-09
Attending: PHYSICAL MEDICINE & REHABILITATION
Payer: MEDICAID

## 2023-03-09 ENCOUNTER — APPOINTMENT (OUTPATIENT)
Dept: PHYSICAL THERAPY | Facility: REHABILITATION | Age: 43
DRG: 101 | End: 2023-03-09
Attending: PHYSICAL MEDICINE & REHABILITATION
Payer: MEDICAID

## 2023-03-09 ENCOUNTER — APPOINTMENT (OUTPATIENT)
Dept: OCCUPATIONAL THERAPY | Facility: REHABILITATION | Age: 43
DRG: 101 | End: 2023-03-09
Attending: PHYSICAL MEDICINE & REHABILITATION
Payer: MEDICAID

## 2023-03-09 LAB
ANION GAP SERPL CALC-SCNC: 10 MMOL/L (ref 7–16)
BASOPHILS # BLD AUTO: 0.7 % (ref 0–1.8)
BASOPHILS # BLD: 0.05 K/UL (ref 0–0.12)
BUN SERPL-MCNC: 22 MG/DL (ref 8–22)
CALCIUM SERPL-MCNC: 9.2 MG/DL (ref 8.5–10.5)
CHLORIDE SERPL-SCNC: 109 MMOL/L (ref 96–112)
CO2 SERPL-SCNC: 24 MMOL/L (ref 20–33)
CREAT SERPL-MCNC: 0.8 MG/DL (ref 0.5–1.4)
EOSINOPHIL # BLD AUTO: 0.12 K/UL (ref 0–0.51)
EOSINOPHIL NFR BLD: 1.8 % (ref 0–6.9)
ERYTHROCYTE [DISTWIDTH] IN BLOOD BY AUTOMATED COUNT: 41 FL (ref 35.9–50)
GFR SERPLBLD CREATININE-BSD FMLA CKD-EPI: 114 ML/MIN/1.73 M 2
GLUCOSE BLD STRIP.AUTO-MCNC: 108 MG/DL (ref 65–99)
GLUCOSE BLD STRIP.AUTO-MCNC: 118 MG/DL (ref 65–99)
GLUCOSE BLD STRIP.AUTO-MCNC: 119 MG/DL (ref 65–99)
GLUCOSE BLD STRIP.AUTO-MCNC: 125 MG/DL (ref 65–99)
GLUCOSE BLD STRIP.AUTO-MCNC: 147 MG/DL (ref 65–99)
GLUCOSE SERPL-MCNC: 98 MG/DL (ref 65–99)
HCT VFR BLD AUTO: 41.3 % (ref 42–52)
HGB BLD-MCNC: 13.8 G/DL (ref 14–18)
IMM GRANULOCYTES # BLD AUTO: 0.06 K/UL (ref 0–0.11)
IMM GRANULOCYTES NFR BLD AUTO: 0.9 % (ref 0–0.9)
LYMPHOCYTES # BLD AUTO: 3.1 K/UL (ref 1–4.8)
LYMPHOCYTES NFR BLD: 46.3 % (ref 22–41)
MAGNESIUM SERPL-MCNC: 1.9 MG/DL (ref 1.5–2.5)
MCH RBC QN AUTO: 30.3 PG (ref 27–33)
MCHC RBC AUTO-ENTMCNC: 33.4 G/DL (ref 33.7–35.3)
MCV RBC AUTO: 90.8 FL (ref 81.4–97.8)
MONOCYTES # BLD AUTO: 0.75 K/UL (ref 0–0.85)
MONOCYTES NFR BLD AUTO: 11.2 % (ref 0–13.4)
NEUTROPHILS # BLD AUTO: 2.61 K/UL (ref 1.82–7.42)
NEUTROPHILS NFR BLD: 39.1 % (ref 44–72)
NRBC # BLD AUTO: 0 K/UL
NRBC BLD-RTO: 0 /100 WBC
PLATELET # BLD AUTO: 233 K/UL (ref 164–446)
PMV BLD AUTO: 11.3 FL (ref 9–12.9)
POTASSIUM SERPL-SCNC: 4.2 MMOL/L (ref 3.6–5.5)
RBC # BLD AUTO: 4.55 M/UL (ref 4.7–6.1)
S PYO DNA SPEC NAA+PROBE: NOT DETECTED
SODIUM SERPL-SCNC: 143 MMOL/L (ref 135–145)
WBC # BLD AUTO: 6.7 K/UL (ref 4.8–10.8)

## 2023-03-09 PROCEDURE — 97116 GAIT TRAINING THERAPY: CPT | Mod: CQ

## 2023-03-09 PROCEDURE — 700111 HCHG RX REV CODE 636 W/ 250 OVERRIDE (IP): Performed by: PHYSICAL MEDICINE & REHABILITATION

## 2023-03-09 PROCEDURE — 99232 SBSQ HOSP IP/OBS MODERATE 35: CPT | Performed by: PHYSICAL MEDICINE & REHABILITATION

## 2023-03-09 PROCEDURE — 36415 COLL VENOUS BLD VENIPUNCTURE: CPT

## 2023-03-09 PROCEDURE — 85025 COMPLETE CBC W/AUTO DIFF WBC: CPT

## 2023-03-09 PROCEDURE — 99232 SBSQ HOSP IP/OBS MODERATE 35: CPT | Performed by: HOSPITALIST

## 2023-03-09 PROCEDURE — 97530 THERAPEUTIC ACTIVITIES: CPT

## 2023-03-09 PROCEDURE — 97129 THER IVNTJ 1ST 15 MIN: CPT

## 2023-03-09 PROCEDURE — 87651 STREP A DNA AMP PROBE: CPT

## 2023-03-09 PROCEDURE — 83735 ASSAY OF MAGNESIUM: CPT

## 2023-03-09 PROCEDURE — 82962 GLUCOSE BLOOD TEST: CPT

## 2023-03-09 PROCEDURE — 97535 SELF CARE MNGMENT TRAINING: CPT

## 2023-03-09 PROCEDURE — 97110 THERAPEUTIC EXERCISES: CPT

## 2023-03-09 PROCEDURE — 94760 N-INVAS EAR/PLS OXIMETRY 1: CPT

## 2023-03-09 PROCEDURE — 700102 HCHG RX REV CODE 250 W/ 637 OVERRIDE(OP): Performed by: PHYSICAL MEDICINE & REHABILITATION

## 2023-03-09 PROCEDURE — 80048 BASIC METABOLIC PNL TOTAL CA: CPT

## 2023-03-09 PROCEDURE — A9270 NON-COVERED ITEM OR SERVICE: HCPCS | Performed by: PHYSICAL MEDICINE & REHABILITATION

## 2023-03-09 PROCEDURE — 770010 HCHG ROOM/CARE - REHAB SEMI PRIVAT*

## 2023-03-09 PROCEDURE — 97130 THER IVNTJ EA ADDL 15 MIN: CPT

## 2023-03-09 RX ADMIN — LEVETIRACETAM 1000 MG: 500 TABLET, FILM COATED ORAL at 20:52

## 2023-03-09 RX ADMIN — DORZOLAMIDE HYDROCHLORIDE AND TIMOLOL MALEATE 1 DROP: 20; 5 SOLUTION/ DROPS OPHTHALMIC at 20:58

## 2023-03-09 RX ADMIN — OMEPRAZOLE 20 MG: 20 CAPSULE, DELAYED RELEASE ORAL at 08:09

## 2023-03-09 RX ADMIN — FENOFIBRATE 134 MG: 67 CAPSULE ORAL at 08:10

## 2023-03-09 RX ADMIN — TOPIRAMATE 50 MG: 25 TABLET, FILM COATED ORAL at 20:53

## 2023-03-09 RX ADMIN — BUDESONIDE AND FORMOTEROL FUMARATE DIHYDRATE 2 PUFF: 160; 4.5 AEROSOL RESPIRATORY (INHALATION) at 08:28

## 2023-03-09 RX ADMIN — PRAZOSIN HYDROCHLORIDE 5 MG: 5 CAPSULE ORAL at 20:53

## 2023-03-09 RX ADMIN — ENOXAPARIN SODIUM 40 MG: 40 INJECTION SUBCUTANEOUS at 08:07

## 2023-03-09 RX ADMIN — LEVOTHYROXINE SODIUM 25 MCG: 0.03 TABLET ORAL at 05:31

## 2023-03-09 RX ADMIN — LORATADINE 10 MG: 10 TABLET ORAL at 08:09

## 2023-03-09 RX ADMIN — BUDESONIDE AND FORMOTEROL FUMARATE DIHYDRATE 2 PUFF: 160; 4.5 AEROSOL RESPIRATORY (INHALATION) at 20:58

## 2023-03-09 RX ADMIN — LURASIDONE HYDROCHLORIDE 20 MG: 20 TABLET, FILM COATED ORAL at 17:13

## 2023-03-09 RX ADMIN — DIVALPROEX SODIUM 500 MG: 500 TABLET, DELAYED RELEASE ORAL at 08:10

## 2023-03-09 RX ADMIN — TRAMADOL HYDROCHLORIDE 50 MG: 50 TABLET, COATED ORAL at 11:11

## 2023-03-09 RX ADMIN — Medication 4000 UNITS: at 20:52

## 2023-03-09 RX ADMIN — DIVALPROEX SODIUM 1500 MG: 500 TABLET, DELAYED RELEASE ORAL at 20:52

## 2023-03-09 RX ADMIN — TRAZODONE HYDROCHLORIDE 100 MG: 100 TABLET ORAL at 20:54

## 2023-03-09 RX ADMIN — ATORVASTATIN CALCIUM 20 MG: 10 TABLET, FILM COATED ORAL at 20:54

## 2023-03-09 RX ADMIN — DORZOLAMIDE HYDROCHLORIDE AND TIMOLOL MALEATE 1 DROP: 20; 5 SOLUTION/ DROPS OPHTHALMIC at 08:10

## 2023-03-09 RX ADMIN — LEVOTHYROXINE SODIUM 200 MCG: 0.12 TABLET ORAL at 05:30

## 2023-03-09 RX ADMIN — TRAMADOL HYDROCHLORIDE 50 MG: 50 TABLET, COATED ORAL at 20:58

## 2023-03-09 RX ADMIN — TOPIRAMATE 50 MG: 25 TABLET, FILM COATED ORAL at 08:09

## 2023-03-09 RX ADMIN — LEVETIRACETAM 1000 MG: 500 TABLET, FILM COATED ORAL at 08:09

## 2023-03-09 RX ADMIN — MONTELUKAST 10 MG: 10 TABLET, FILM COATED ORAL at 20:52

## 2023-03-09 RX ADMIN — SERTRALINE HYDROCHLORIDE 150 MG: 50 TABLET, FILM COATED ORAL at 20:53

## 2023-03-09 RX ADMIN — SENNOSIDES AND DOCUSATE SODIUM 2 TABLET: 50; 8.6 TABLET ORAL at 20:53

## 2023-03-09 ASSESSMENT — GAIT ASSESSMENTS
DEVIATION: BRADYKINETIC;OTHER (COMMENT)
GAIT LEVEL OF ASSIST: CONTACT GUARD ASSIST
DISTANCE (FEET): 70
ASSISTIVE DEVICE: FRONT WHEEL WALKER

## 2023-03-09 ASSESSMENT — PAIN DESCRIPTION - PAIN TYPE: TYPE: CHRONIC PAIN

## 2023-03-09 ASSESSMENT — ACTIVITIES OF DAILY LIVING (ADL)
BED_CHAIR_WHEELCHAIR_TRANSFER_DESCRIPTION: ADAPTIVE EQUIPMENT;INCREASED TIME;SUPERVISION FOR SAFETY
BED_CHAIR_WHEELCHAIR_TRANSFER_DESCRIPTION: ADAPTIVE EQUIPMENT;INCREASED TIME;SET-UP OF EQUIPMENT;SUPERVISION FOR SAFETY;VERBAL CUEING
TOILETING_LEVEL_OF_ASSIST_DESCRIPTION: GRAB BAR;SET-UP OF EQUIPMENT;SUPERVISION FOR SAFETY;VERBAL CUEING

## 2023-03-09 ASSESSMENT — ENCOUNTER SYMPTOMS
FEVER: 0
COUGH: 0
DIARRHEA: 0
DIZZINESS: 0
BLURRED VISION: 0
NERVOUS/ANXIOUS: 0

## 2023-03-09 NOTE — THERAPY
"Occupational Therapy  Daily Treatment     Patient Name: Norm Prabhakar  Age:  42 y.o., Sex:  male  Medical Record #: [id number]  Today's Date: 3/9/2023     Precautions  Precautions: Fall Risk  Comments: seizure, R hemiparesis; possible conversion disorder         Subjective    Patient was resting in bed upon arrival. Patient was agreeable to OT session. \"My legs feel wobbly still.\"     Objective       03/09/23 0831   OT Charge Group   OT Self Care / ADL (Units) 2   OT Therapy Activity (Units) 1   OT Therapeutic Exercise (Units) 1   OT Total Time Spent   OT Individual Total Time Spent (Mins) 60   Cosignature   Documentation Review Approved as originally documented and filed.   Precautions   Precautions Fall Risk   Comments seizure, R hemiparesis; possible conversion disorder   Functional Level of Assist   Grooming Modified Independent;Seated   Grooming Description Seated in wheelchair at sink   Upper Body Dressing Modified Independent  (donned jacket EOB)   Lower Body Dressing Supervision   Lower Body Dressing Description Assistive devices;Set-up of equipment;Supervision for safety  (donned pull ups and hospital pants EOB with FWW)   Bed, Chair, Wheelchair Transfer Supervised   Bed Chair Wheelchair Transfer Description Adaptive equipment;Increased time;Supervision for safety  (FWW)   Sitting Upper Body Exercises   Chest Press 1 set of 10;Bilateral;Weight (See Comments for lbs)   External Shoulder Rotation 1 set of 10;Bilateral;Weight (See Comments for lbs)   Bicep Curls 1 set of 10;Bilateral;Weight (See Comments for lbs)  (L 5 lbs, R 0 lbs)   Elbow Extension 1 set of 10;Weight (See Comments for lbs);Left  (L 5 lbs)   Pronation / Supination 1 set of 10;Bilateral;Weight (See Comments for lbs)   Wrist Flexion / Extension 1 set of 10;Bilateral;Weight (See Comments for lbs)   Other Exercise scap retraction (5 lbs), sacp elevation (5 lbs), shoulder flex/extension (5 lbs), tendon glides (0 lbs), 1 x 10 each, patient given " HEP   IADL Treatments   IADL Treatments Home management   Home Management Patient stood at laundry machine using B UE to load, add soap, and start with SBA. Patient dropped pants and cued to sit in w/c to  and then stand back up. Patient required VCs to start machine.   Interdisciplinary Plan of Care Collaboration   IDT Collaboration with  Physical Therapist   Patient Position at End of Therapy Seated;Wrist Restraints Applied;Other (Comments)  (seated at table in main therapy gym waiting for PT session to begin)   Collaboration Comments CLOF     Patient reviewed HEP with 5 lbs.      Assessment    Patient was pleasant throughout session. Patient completed dressing and grooming with supervision to mod I. He completed laundry tasks well and incorporated B UE. Patient had some difficulty with R UE during bicep curls and elbow extension, but was able to complete all other exercises with R and L UE well.    Strengths: Able to follow instructions, Effective communication skills, Motivated for self care and independence, Manages pain appropriately, Pleasant and cooperative, Supportive family, Willingly participates in therapeutic activities, Alert and oriented, Independent prior level of function  Barriers: Decreased endurance, Hemiparesis, Impaired activity tolerance, Impaired balance, Limited mobility    Plan    Complete IRF-Db and BIMS for d/c on Saturday.   ADLs, IADLs, transfers, functional mobility, R UE AROM/strengthening, standing tolerance/balance    Occupational Therapy Goals (Active)       Problem: Functional Transfers       Dates: Start: 03/02/23         Goal: STG-Within one week, patient will transfer to toilet with setup and supervision       Dates: Start: 03/02/23   Expected End: 03/11/23         Goal Note filed on 03/09/23 1025 by Juan Preston OT/PRIMO       SBA                 Problem: OT Long Term Goals       Dates: Start: 02/28/23         Goal: LTG-By discharge, patient will complete basic self  care tasks with supervision.       Dates: Start: 02/28/23   Expected End: 03/11/23            Goal: LTG-By discharge, patient will perform bathroom transfers with supervision.       Dates: Start: 02/28/23   Expected End: 03/11/23            Goal: LTG-By discharge, patient will complete basic home management with supervision.        Dates: Start: 02/28/23   Expected End: 03/11/23               Problem: Toileting       Dates: Start: 03/02/23         Goal: STG-Within one week, patient will complete toileting tasks with setup and supervision       Dates: Start: 03/02/23   Expected End: 03/11/23         Goal Note filed on 03/09/23 1025 by Juan Preston, OT/L       SBA

## 2023-03-09 NOTE — CARE PLAN
The patient is Stable - Low risk of patient condition declining or worsening      Problem: Mobility  Goal: Patient's capacity to carry out activities will improve  Outcome: Progressing     Problem: Fall Risk - Rehab  Goal: Patient will remain free from falls  Outcome: Progressing

## 2023-03-09 NOTE — PROGRESS NOTES
Hospital Medicine Daily Progress Note        Chief Complaint  Hypertension  Diabetes    Interval Problem Update  Discussed about his BS better after changing his long acting insulin from bid to daily.    Review of Systems  Review of Systems   Constitutional:  Negative for fever.   Eyes:  Negative for blurred vision.   Respiratory:  Negative for cough.    Cardiovascular:  Negative for chest pain.   Gastrointestinal:  Negative for diarrhea.   Musculoskeletal:  Negative for joint pain.   Neurological:  Negative for dizziness.   Psychiatric/Behavioral:  The patient is not nervous/anxious.       Physical Exam  Temp:  [36.6 °C (97.9 °F)-36.7 °C (98.1 °F)] 36.6 °C (97.9 °F)  Pulse:  [46-76] 60  Resp:  [16-18] 16  BP: (102-112)/(52-79) 112/79  SpO2:  [94 %-98 %] 94 %    Physical Exam  Vitals and nursing note reviewed.   Constitutional:       Appearance: He is not diaphoretic.   HENT:      Mouth/Throat:      Pharynx: No oropharyngeal exudate or posterior oropharyngeal erythema.   Eyes:      Extraocular Movements: Extraocular movements intact.   Neck:      Vascular: No carotid bruit.   Cardiovascular:      Rate and Rhythm: Normal rate and regular rhythm.      Heart sounds: No murmur heard.  Pulmonary:      Effort: Pulmonary effort is normal.      Breath sounds: Normal breath sounds. No stridor.   Abdominal:      General: Bowel sounds are normal.      Palpations: Abdomen is soft.   Musculoskeletal:      Right lower leg: No edema.      Left lower leg: No edema.   Skin:     General: Skin is warm and dry.      Findings: No rash.   Neurological:      Mental Status: He is alert and oriented to person, place, and time.   Psychiatric:         Mood and Affect: Mood normal.         Behavior: Behavior normal.       Fluids    Intake/Output Summary (Last 24 hours) at 3/9/2023 0838  Last data filed at 3/9/2023 0056  Gross per 24 hour   Intake 540 ml   Output 3000 ml   Net -2460 ml         Laboratory  Recent Labs     03/09/23  0524   WBC  6.7   RBC 4.55*   HEMOGLOBIN 13.8*   HEMATOCRIT 41.3*   MCV 90.8   MCH 30.3   MCHC 33.4*   RDW 41.0   PLATELETCT 233   MPV 11.3       Recent Labs     03/09/23  0524   SODIUM 143   POTASSIUM 4.2   CHLORIDE 109   CO2 24   GLUCOSE 98   BUN 22   CREATININE 0.80   CALCIUM 9.2                     Assessment/Plan  * Seizure disorder (HCC)- (present on admission)  Assessment & Plan  On Depakote and Keppra    Azotemia  Assessment & Plan  Currently resolved  Bun: 28 --> 23 --> 22  Encouraging fluid intake  Monitor    Hypothyroidism- (present on admission)  Assessment & Plan  Hx thyroidectomy  TSH: 7.55  FT4: 1.16  ? Subclinical   On Synthroid  Needs repeat TFT's as an outpatient    Primary hypertension- (present on admission)  Assessment & Plan  BP ok  Off Lisinopril  On Minipress  Monitor    Mixed hyperlipidemia- (present on admission)  Assessment & Plan  On Lipitor  On Fenofibrate    Type 2 diabetes mellitus without complication, without long-term current use of insulin (HCC)- (present on admission)  Assessment & Plan  HbaA1c: 7.8 (2/28)  BS:   Off Metformin  On Lantus 20 units bid --> 20 units qam (3/8)  Note: home meds include Lantus, Farxiga, Januvia, Metformin 1000 mg bid, Humalog 12 units at meals  Will monitor another day since meds were recently adjusted    Psychiatric problem- (present on admission)  Assessment & Plan  On Zoloft,Topamax, Latuda    Glaucoma- (present on admission)  Assessment & Plan  On Cosopt    Asthma- (present on admission)  Assessment & Plan  On Symbicort and Singulair

## 2023-03-09 NOTE — CARE PLAN
Problem: Toileting  Goal: STG-Within one week, patient will complete toileting tasks with setup and supervision  Outcome: Not Met  Note: SBA     Problem: Functional Transfers  Goal: STG-Within one week, patient will transfer to toilet with setup and supervision  Outcome: Not Met  Note: SBA     Problem: Bathing  Goal: STG-Within one week, patient will bathe with SBA using grab bars as needed.   Outcome: Met  Note: SBA     Problem: Dressing  Goal: STG-Within one week, patient will dress LB with setup and supervision  Outcome: Met  Note: Setup and supervision

## 2023-03-09 NOTE — THERAPY
"Physical Therapy   Daily Treatment     Patient Name: Norm Prabhakar  Age:  42 y.o., Sex:  male  Medical Record #: [id number]  Today's Date: 3/9/2023     Precautions  Precautions: Fall Risk  Comments: seizure, R hemiparesis; possible conversion disorder    Subjective    \"Look at this suit I ordered\"    Pt very excited about some items he ordered online and wants to show them all to the therapist     Objective       03/09/23 0931   PT Charge Group   PT Gait Training (Units) 2   Supervising Physical Therapist Elke Hernandez DPT   PT Total Time Spent   PT Individual Total Time Spent (Mins) 30   Gait Functional Level of Assist    Gait Level Of Assist Contact Guard Assist   Assistive Device Front Wheel Walker   Distance (Feet) 70   # of Times Distance was Traveled 3   Deviation Bradykinetic;Other (Comment)  (cues for upright posture)   Bed Mobility    Sit to Stand Standby Assist   Interdisciplinary Plan of Care Collaboration   IDT Collaboration with  Physical Therapist   Patient Position at End of Therapy Seated;Other (Comments)  (pt self propelled himself to his room indirect SPV in the wc)   Collaboration Comments CLOF         Assessment    Pt is progressing his tolerance to gait training, requiring CGA with FWW on indoor level surfaces, no gross LOB, inc time in double LE stance and req vc for upright posture/gaze  Strengths: Able to follow instructions, Alert and oriented, Effective communication skills, Independent prior level of function, Motivated for self care and independence, Pleasant and cooperative, Willingly participates in therapeutic activities  Barriers: Decreased endurance, Fatigue, Hemiparesis, Impaired activity tolerance, Impaired balance, Impaired functional cognition, Limited mobility    Plan    Progressive gait endurance and speed with FWW, standing tolerance, stairs/curb navigation, LE strengthening and endurance. Consider TENS during tx if back pain is limiting activity     Passport items to be " completed:  Get in/out of bed safely, in/out of a vehicle, safely use mobility device, walk or wheel around home/community, navigate up and down stairs, show how to get up/down from the ground, ensure home is accessible, demonstrate HEP, complete caregiver training       Physical Therapy Problems (Active)       Problem: Mobility       Dates: Start: 02/28/23         Goal: STG-Within one week, patient will ambulate household distance 25 ft x2 with FWW and CGA.       Dates: Start: 02/28/23   Expected End: 03/11/23         Goal Note filed on 03/02/23 1342 by Elke Hernandez, PT       20 ft x2 with min A and wc follow              Goal: STG-Within one week, patient will ambulate up/down a curb with FWW and min A.       Dates: Start: 02/28/23   Expected End: 03/11/23         Goal Note filed on 03/02/23 1342 by Elke Hernandez, PT       Not yet assessed d/t safety concerns              Goal: STG-Within one week, patient will ambulate up/down flight of stairs with min A using BHR and RB prn.       Dates: Start: 02/28/23   Expected End: 03/11/23         Goal Note filed on 03/02/23 1342 by Elke Hernandez, PT       Not yet assessed d/t safety concerns                 Problem: Mobility Transfers       Dates: Start: 02/28/23         Goal: STG-Within one week, patient will transfer bed to chair with FWW and SBA.       Dates: Start: 02/28/23   Expected End: 03/11/23         Goal Note filed on 03/02/23 1342 by Elke Hernandez, PT       CGA                 Problem: PT-Long Term Goals       Dates: Start: 02/28/23         Goal: LTG-By discharge, patient will ambulate household distances at least 150 ft with FWW vs LRAD and SPV.       Dates: Start: 02/28/23   Expected End: 03/11/23            Goal: LTG-By discharge, patient will transfer one surface to another with FWW vs LRAD and SPV.       Dates: Start: 02/28/23   Expected End: 03/11/23            Goal: LTG-By discharge, patient will ambulate up/down flight of stairs with BHR and SPV requiring  no RB.       Dates: Start: 02/28/23   Expected End: 03/11/23            Goal: LTG-By discharge, patient will transfer in/out of a car with FWW vs LRAD and set up A.       Dates: Start: 02/28/23   Expected End: 03/11/23

## 2023-03-09 NOTE — DISCHARGE PLANNING
Case Management/IDT follow up.   IDT continues to recommend IRF level of care as patient continue to make progress with all therapies.   Projected dc date set for 3/11/23      DC needs:  Recommendations made for outpatient PT/OT/SLP  Follow up with: PCP and Dr. Morocho    Met with pt / family providing update from IDT and discussed plan of care.    Plan:  Continue to follow

## 2023-03-09 NOTE — CARE PLAN
Problem: Mobility  Goal: STG-Within one week, patient will ambulate household distance 25 ft x2 with FWW and CGA.  Outcome: Met  Goal: STG-Within one week, patient will ambulate up/down a curb with FWW and min A.  Outcome: Met  Goal: STG-Within one week, patient will ambulate up/down flight of stairs with min A using BHR and RB prn.  Outcome: Met     Problem: Mobility Transfers  Goal: STG-Within one week, patient will transfer bed to chair with FWW and SBA.  Outcome: Met

## 2023-03-09 NOTE — CARE PLAN
Problem: Memory STGs  Goal: STG-Within one week, patient will recall new training, daily events with 75% acc with min A.  Outcome: Met  Note: Patient is able to recall daily events and new training with 75% acc with min A.  Goal: STG-Within one week, patient will perform functional math tasks with 80% acc with min A.  Outcome: Met  Note: Patient is able to perform simple functional math tasks with 80% acc with min A with extra time needed to process.

## 2023-03-09 NOTE — PROGRESS NOTES
Physical Medicine & Rehabilitation Progress Note    Encounter Date: 3/9/2023    Chief Complaint: Decreased mobility, right sided weakness    Interval Events (Subjective):  Patient sitting up in room. He reports sore throat which can be bad enough it is hard to swallow and feels a little swollen. He denies cough or fevers. AM CBC wnl BMP and electrolytes are fine. Discussed would check strep PCR. Discussed would have final IDT later today.     _____________________________________  Interdisciplinary Team Conference   Most recent IDT on 3/9/2023    IKaran M.D./Ph.D., was present and led the interdisciplinary team conference on 3/9/2023.  I led the IDT conference and agree with the IDT conference documentation and plan of care as noted below.     Nursing:  Diet Current Diet Order   Procedures    Diet Order Diet: Consistent CHO (Diabetic)       Eating ADL Independent      % of Last Meal  Oral Nutrition: *  * Meal *  *, Breakfast, Between 50-75% Consumed   Sleep    Bowel Last BM: 03/08/23   Bladder    Barriers to Discharge Home:   Back pain    Physical Therapy:  Bed Mobility    Transfers Standby Assist  Adaptive equipment, Increased time, Set-up of equipment, Supervision for safety, Verbal cueing (stand step FWW)   Mobility Contact Guard Assist   Stairs    Barriers to Discharge Home:  Improved  Poor endurance; poor motivation    Outpatient; WC    Occupational Therapy:  Grooming Modified Independent, Seated   Bathing Standby Assist   UB Dressing Modified Independent (donned jacket EOB)   LB Dressing Supervision   Toileting Standby Assist   Shower & Transfer    Barriers to Discharge Home:  Poor motivation for self care  Impaired activity tolerance    Doing better ; OT    Speech-Language Pathology:  Comprehension:  Supervision  Comprehension Description:  Glasses, Verbal cues  Expression:  Modified Independent  Expression Description:  Other (comment) (extra time)  Social Interaction:  Modified  "Independent  Social Interaction Description:  Medication  Problem Solving:  Minimal Assist  Problem Solving Description:  Increased time, Supervision, Bed/chair alarm, Therapy schedule, Verbal cueing  Memory:  Minimal Assist  Memory Description:  Increased time, Supervision, Bed/chair alarm, Therapy schedule, Verbal cueing  Barriers to Discharge Home:  [place] for memory   Grandiose at times    Rec Therapy:  Attention; motivation    Case Management:  Continues to work on disposition and DME needs.      Discharge Date/Disposition:  3/11/23  _____________________________________    Objective:  VITAL SIGNS: /79   Pulse 60   Temp 36.6 °C (97.9 °F) (Oral)   Resp 16   Ht 1.981 m (6' 6\")   Wt 109 kg (240 lb)   SpO2 94%   BMI 27.73 kg/m²   Gen: NAD  Psych: Mood and affect appropriate  CV: RRR, 0 edema  Resp: CTAB, no upper airway sounds  Abd: NTND  Neuro: AOx4, following commands  Unchanged from 3/8/23, in addition HEENT: small redness bilateral tonsils    Laboratory Values:  Recent Results (from the past 72 hour(s))   POCT glucose device results    Collection Time: 03/06/23  5:02 PM   Result Value Ref Range    POC Glucose, Blood 245 (H) 65 - 99 mg/dL   POCT glucose device results    Collection Time: 03/06/23  8:10 PM   Result Value Ref Range    POC Glucose, Blood 230 (H) 65 - 99 mg/dL   POCT glucose device results    Collection Time: 03/07/23  8:15 AM   Result Value Ref Range    POC Glucose, Blood 126 (H) 65 - 99 mg/dL   POCT glucose device results    Collection Time: 03/07/23 10:57 AM   Result Value Ref Range    POC Glucose, Blood 109 (H) 65 - 99 mg/dL   POCT glucose device results    Collection Time: 03/07/23  5:04 PM   Result Value Ref Range    POC Glucose, Blood 85 65 - 99 mg/dL   POCT glucose device results    Collection Time: 03/07/23  8:03 PM   Result Value Ref Range    POC Glucose, Blood 90 65 - 99 mg/dL   POCT glucose device results    Collection Time: 03/08/23  7:34 AM   Result Value Ref Range    POC " Glucose, Blood 93 65 - 99 mg/dL   POCT glucose device results    Collection Time: 03/08/23  9:00 AM   Result Value Ref Range    POC Glucose, Blood 123 (H) 65 - 99 mg/dL   POCT glucose device results    Collection Time: 03/08/23 11:53 AM   Result Value Ref Range    POC Glucose, Blood 117 (H) 65 - 99 mg/dL   POCT glucose device results    Collection Time: 03/08/23  5:11 PM   Result Value Ref Range    POC Glucose, Blood 91 65 - 99 mg/dL   POCT glucose device results    Collection Time: 03/08/23  8:47 PM   Result Value Ref Range    POC Glucose, Blood 118 (H) 65 - 99 mg/dL   CBC WITH DIFFERENTIAL    Collection Time: 03/09/23  5:24 AM   Result Value Ref Range    WBC 6.7 4.8 - 10.8 K/uL    RBC 4.55 (L) 4.70 - 6.10 M/uL    Hemoglobin 13.8 (L) 14.0 - 18.0 g/dL    Hematocrit 41.3 (L) 42.0 - 52.0 %    MCV 90.8 81.4 - 97.8 fL    MCH 30.3 27.0 - 33.0 pg    MCHC 33.4 (L) 33.7 - 35.3 g/dL    RDW 41.0 35.9 - 50.0 fL    Platelet Count 233 164 - 446 K/uL    MPV 11.3 9.0 - 12.9 fL    Neutrophils-Polys 39.10 (L) 44.00 - 72.00 %    Lymphocytes 46.30 (H) 22.00 - 41.00 %    Monocytes 11.20 0.00 - 13.40 %    Eosinophils 1.80 0.00 - 6.90 %    Basophils 0.70 0.00 - 1.80 %    Immature Granulocytes 0.90 0.00 - 0.90 %    Nucleated RBC 0.00 /100 WBC    Neutrophils (Absolute) 2.61 1.82 - 7.42 K/uL    Lymphs (Absolute) 3.10 1.00 - 4.80 K/uL    Monos (Absolute) 0.75 0.00 - 0.85 K/uL    Eos (Absolute) 0.12 0.00 - 0.51 K/uL    Baso (Absolute) 0.05 0.00 - 0.12 K/uL    Immature Granulocytes (abs) 0.06 0.00 - 0.11 K/uL    NRBC (Absolute) 0.00 K/uL   Basic Metabolic Panel    Collection Time: 03/09/23  5:24 AM   Result Value Ref Range    Sodium 143 135 - 145 mmol/L    Potassium 4.2 3.6 - 5.5 mmol/L    Chloride 109 96 - 112 mmol/L    Co2 24 20 - 33 mmol/L    Glucose 98 65 - 99 mg/dL    Bun 22 8 - 22 mg/dL    Creatinine 0.80 0.50 - 1.40 mg/dL    Calcium 9.2 8.5 - 10.5 mg/dL    Anion Gap 10.0 7.0 - 16.0   MAGNESIUM    Collection Time: 03/09/23  5:24 AM    Result Value Ref Range    Magnesium 1.9 1.5 - 2.5 mg/dL   ESTIMATED GFR    Collection Time: 03/09/23  5:24 AM   Result Value Ref Range    GFR (CKD-EPI) 114 >60 mL/min/1.73 m 2   POCT glucose device results    Collection Time: 03/09/23  7:30 AM   Result Value Ref Range    POC Glucose, Blood 108 (H) 65 - 99 mg/dL   POCT glucose device results    Collection Time: 03/09/23 11:13 AM   Result Value Ref Range    POC Glucose, Blood 147 (H) 65 - 99 mg/dL       Medications:  Scheduled Medications   Medication Dose Frequency    insulin GLARGINE  20 Units QAM INSULIN    insulin lispro  2-12 Units 4X/DAY ACHS    lurasidone  20 mg PM MEAL    senna-docusate  2 Tablet BID    omeprazole  20 mg DAILY    atorvastatin  20 mg Q EVENING    levothyroxine  200 mcg AM ES    levothyroxine  25 mcg AM ES    vitamin D3  4,000 Units Q EVENING    divalproex  1,500 mg QHS    fenofibrate micronized  134 mg DAILY    montelukast  10 mg Q EVENING    prazosin  5 mg Q EVENING    sertraline  150 mg QHS    levETIRAcetam  1,000 mg Q12HRS    divalproex  500 mg DAILY    loratadine  10 mg DAILY    topiramate  50 mg Q12HRS    traZODone  100 mg QHS    dorzolamide-timolol  1 Drop BID    budesonide-formoterol  2 Puff BID (RT)    enoxaparin (LOVENOX) injection  40 mg DAILY     PRN medications: [DISCONTINUED] insulin regular **AND** POC blood glucose manual result **AND** NOTIFY MD and PharmD **AND** Administer 20 grams of glucose (approximately 8 ounces of fruit juice) every 15 minutes PRN FSBG less than 70 mg/dL **AND** dextrose bolus, Respiratory Therapy Consult, hydrALAZINE, acetaminophen, senna-docusate **AND** polyethylene glycol/lytes **AND** magnesium hydroxide **AND** bisacodyl, mag hydrox-al hydrox-simeth, ondansetron **OR** ondansetron, traZODone, sodium chloride, traMADol, midazolam, albuterol    Diet:  Current Diet Order   Procedures    Diet Order Diet: Consistent CHO (Diabetic)       Assessment:  Active Hospital Problems    Diagnosis     *Seizure  disorder (HCC)     Acute right-sided weakness     Primary hypertension     Mixed hyperlipidemia     Type 2 diabetes mellitus without complication, without long-term current use of insulin (HCC)     PTSD (post-traumatic stress disorder)     Neck mass     Postoperative hypothyroidism     Anxiety and depression        Medical Decision Making and Plan:  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  -PT and OT for mobility and ADLs. Per guidelines, 15 hours per week between PT, OT and SLP.  -Follow-up APAP/Tramadol  -Continue Keppra and Depakote     HTN - Patient on Lisinopril 10 mg. Lisinopril discontinue for low SBP  -Bradycardia on 3/8, will check Magnesium     DM2 with hyperglycemia - Patient on Lantus and SSI as well as metformin, Januvia. Blood sugars elevated, consult hospitalist. A1c 7.8. Ongoing hyperglycemia. Continue Lantus and SSI  -Lantus adjusted to 20 U BID. Discussed with hospitalist and will restart Metformin. Had low blood sugars on 3/7, discussed with hospitalist and discontinue. Will continue to monitor     HLD - Patient on Atorvastatin 20 mg QHS and Lofibra     Glaucoma - Patient on Cosopt      BPD/Mood disorder - Patient on Depakote 500/1500, Prazosin 5 mg daily, Sertraline 150 mg, Topamax 50 mg BID, Latuda 80 mg QHS and Trazodone 100 mg   -Consult Neuropsychology. Has history of suicide attempts per chart reviewed. Discussed case with Dr. Cervantes . Home dose is Latuda 20 mg, will reduce. Continue 20 mg daily    Anemia - 13.9 on admission, repeat 3/2 - 13.1. Repeat 3/9 - 13.8, near resolution    Azotemia - BUN 28 on 3/2/23. Encourage PO fluids for now. Repeat 3/9 - 22, improving    Sore throat - New on 3/9, WBC normal. Check strep PCR    Hypothyroidism - Patient on 225 mcg of Levothyroxine. TSH elevated but T4 normal.      Asthma - Patient on Singulair, Claritin, and  Symbicort     Pain - APAP/Tramadol PRN     Skin - Patient at risk for skin breakdown due to debility in areas including sacrum, achilles, elbows and head in addition to other sites. Nursing to assess skin daily.     GI Ppx - Patient on Prilosec for GERD prophylaxis. Patient on Senna-docusate for constipation prophylaxis.      DVT Ppx - Patient Lovenox on transfer.  ____________________________________    T. Benjamin Smith MD./PhD.  [facility] - Physical Medicine & Rehabilitation   [facility] - Brain Injury Medicine   ____________________________________  \

## 2023-03-09 NOTE — THERAPY
Speech Language Pathology  Daily Treatment     Patient Name: Norm Prabhakar  Age:  42 y.o., Sex:  male  Medical Record #: [id number]  Today's Date: 3/9/2023     Precautions  Precautions: Fall Risk  Comments: seizure, R hemiparesis; possible conversion disorder    Subjective    Patient reported right hip pain, requested to be seen in bed.     Objective       03/09/23 1301   Treatment Charges   SLP Cognitive Skill Development First 15 Minutes 1   SLP Cognitive Skill Development Additional 15 Minutes 1   SLP Total Time Spent   SLP Individual Total Time Spent (Mins) 30   Cognition   Moderate Attention Minimal (4)   Functional Memory Activities Minimal (4)   Functional Math / Financial Management Minimal (4)   Functional Level of Assist   Comprehension Supervision   Comprehension Description Glasses;Verbal cues   Expression Modified Independent   Expression Description Other (comment)  (extra time)   Social Interaction Modified Independent   Social Interaction Description Medication   Problem Solving Minimal Assist   Problem Solving Description Bed/chair alarm;Seat belt;Increased time;Supervision;Therapy schedule;Verbal cueing   Memory Minimal Assist   Memory Description Bed/chair alarm;Seat belt;Increased time;Supervision;Therapy schedule;Verbal cueing         Assessment    Patient worked on attention and organization in the context of functional checkboook calculations and organization.   Patient performed with 80% acc with min cues to record all critical info and for attention in calculation for accuracy. He requires considerable extra time to complete functional tasks and self distracts with off topic comments.    Strengths: Able to follow instructions, Alert and oriented, Supportive family, Willingly participates in therapeutic activities  Barriers: Impaired carryover of learning, Impaired insight/denial of deficits, Impaired functional cognition    Plan    Target functional math, attention, recall.    Passport items  to be completed:  Express basic needs, understand food/liquid recommendations, consistently follow swallow precautions, manage finances, manage medications, arrive to therapy appointments on time, complete daily memory log entries, solve problems related to safety situations, review education related to hospitalization, complete caregiver training     Speech Therapy Problems (Active)       Problem: Memory STGs       Dates: Start: 02/28/23         Goal: STG-Within one week, patient will recall new training, daily events with 75% acc with min A.       Dates: Start: 02/28/23   Expected End: 03/11/23         Goal Note filed on 03/02/23 1314 by Lulu Leija MS,CCC-SLP       Min to mod cues for recall of new training, with low attention to new training.              Goal: STG-Within one week, patient will       Dates: Start: 02/28/23   Expected End: 03/11/23               Problem: Speech/Swallowing LTGs       Dates: Start: 02/28/23         Goal: LTG-By discharge, patient will solve basic problems and recall safety training with 80% acc with min A.       Dates: Start: 02/28/23   Expected End: 03/11/23

## 2023-03-09 NOTE — THERAPY
"Physical Therapy   Daily Treatment     Patient Name: Norm Prabhakar  Age:  42 y.o., Sex:  male  Medical Record #: [id number]  Today's Date: 3/9/2023     Precautions  Precautions: Fall Risk  Comments: seizure, R hemiparesis; possible conversion disorder    Subjective    Pt received up in  in [place], agreeable to participate. \"I think I had a seizure last night\" d/t bladder accident that pt discovered when he woke up. Requested to check on his laundry.     Objective       03/09/23 1031   PT Charge Group   PT Therapeutic Activities (Units) 4   PT Total Time Spent   PT Individual Total Time Spent (Mins) 60   Pain 0 - 10 Group   Location Hip   Location Orientation Right   Therapist Pain Assessment During Activity;Nurse Notified   Wheelchair Functional Level of Assist   Wheelchair Assist Supervised   Distance Wheelchair (Feet or Distance) 300  (throughout facility at distances up to 300 ft)   Wheelchair Description Extra time;Supervision for safety  (BUE/ LE propulsion)   Stairs Functional Level of Assist   Level of Assist with Stairs Contact Guard Assist   # of Stairs Climbed 1  (on 4\" platform step x2 and on 6\" platform step x2)   Stairs Description Extra time;Limited by fatigue;Supervision for safety;Verbal cueing;Walker  (LLE leading asc, RLE leading desc with min VC for sequencing; tremulous RLE d/t fatigue)   Transfer Functional Level of Assist   Bed, Chair, Wheelchair Transfer Standby Assist   Bed Chair Wheelchair Transfer Description Adaptive equipment;Increased time;Set-up of equipment;Supervision for safety;Verbal cueing  (stand step FWW)   Bed Mobility    Sit to Stand Standby Assist   Scooting Modified Independent  (wc)   Interdisciplinary Plan of Care Collaboration   IDT Collaboration with  Nursing;Recreation Therapist;Certified Nursing Assistant   Patient Position at End of Therapy Seated;Other (Comments)  (in  in cafeteria for lunch)   Collaboration Comments RN gave pain meds and checked blood sugar; " notified rec therapist re: pt request to play wii before d/c; handoff to CNA in cafeteria for lunch     Floor recovery x1 at EOM with min A to ascend d/t onset of R hip pain in side sitting, pt declined to perform again d/t pain.    Fetched laundry from dryer at wc level with mod I.    Provided and verbally reviewed standing HEP and fall prevention/ home safety packets.    Assessment    Pt with onset of R hip pain when performing floor recovery that led pt to decline any standing activities for remainder of session. On track to d/c home on Sat with family support from mother.    Strengths: Able to follow instructions, Alert and oriented, Effective communication skills, Independent prior level of function, Motivated for self care and independence, Pleasant and cooperative, Willingly participates in therapeutic activities  Barriers: Decreased endurance, Fatigue, Hemiparesis, Impaired activity tolerance, Impaired balance, Impaired functional cognition, Limited mobility    Plan    Collect d/c irf elicia for anticipated d/c home 3/11/23. Progressive gait endurance and speed with FWW, standing tolerance, stairs/curb navigation, LE strengthening and endurance. Consider TENS during tx if back pain is limiting activity     Passport items to be completed:  in/out of a vehicle, complete caregiver training    Physical Therapy Problems (Active)       Problem: Mobility       Dates: Start: 02/28/23         Goal: STG-Within one week, patient will ambulate household distance 25 ft x2 with FWW and CGA.       Dates: Start: 02/28/23   Expected End: 03/11/23         Goal Note filed on 03/02/23 1342 by Elke Hernandez, PT       20 ft x2 with min A and wc follow              Goal: STG-Within one week, patient will ambulate up/down a curb with FWW and min A.       Dates: Start: 02/28/23   Expected End: 03/11/23         Goal Note filed on 03/02/23 1342 by Elke Hernandez, PT       Not yet assessed d/t safety concerns              Goal: STG-Within  one week, patient will ambulate up/down flight of stairs with min A using BHR and RB prn.       Dates: Start: 02/28/23   Expected End: 03/11/23         Goal Note filed on 03/02/23 1342 by Elke Hernandez PT       Not yet assessed d/t safety concerns                 Problem: Mobility Transfers       Dates: Start: 02/28/23         Goal: STG-Within one week, patient will transfer bed to chair with FWW and SBA.       Dates: Start: 02/28/23   Expected End: 03/11/23         Goal Note filed on 03/02/23 1342 by Elke Hernandez, PT       CGA                 Problem: PT-Long Term Goals       Dates: Start: 02/28/23         Goal: LTG-By discharge, patient will ambulate household distances at least 150 ft with FWW vs LRAD and SPV.       Dates: Start: 02/28/23   Expected End: 03/11/23            Goal: LTG-By discharge, patient will transfer one surface to another with FWW vs LRAD and SPV.       Dates: Start: 02/28/23   Expected End: 03/11/23            Goal: LTG-By discharge, patient will ambulate up/down flight of stairs with BHR and SPV requiring no RB.       Dates: Start: 02/28/23   Expected End: 03/11/23            Goal: LTG-By discharge, patient will transfer in/out of a car with FWW vs LRAD and set up A.       Dates: Start: 02/28/23   Expected End: 03/11/23

## 2023-03-09 NOTE — FLOWSHEET NOTE
03/09/23 0800   Events/Summary/Plan   Events/Summary/Plan o2spot check   Vital Signs   Pulse 60   Respiration 16   Pulse Oximetry 94 %   $ Pulse Oximetry (Spot Check) Yes   Respiratory Assessment   Level of Consciousness Alert   Chest Exam   Work Of Breathing / Effort Within Normal Limits   Breath Sounds   RUL Breath Sounds Clear   RML Breath Sounds Clear   RLL Breath Sounds Clear   LEONOR Breath Sounds Clear   LLL Breath Sounds Clear   Oxygen   O2 Delivery Device None - Room Air

## 2023-03-09 NOTE — THERAPY
"Physical Therapy   Daily Treatment     Patient Name: Norm Prabhakar  Age:  42 y.o., Sex:  male  Medical Record #: [id number]  Today's Date: 3/8/2023     Precautions  Precautions: Fall Risk  Comments: seizure, R hemiparesis; possible conversion disorder    Subjective    Pt reported fatigue towards end of session, admitting that he could only try a short walk.      Objective       03/08/23 1401   PT Charge Group   PT Neuromuscular Re-Education / Balance (Units) 2   PT Therapeutic Activities (Units) 2   PT Total Time Spent   PT Individual Total Time Spent (Mins) 60   Precautions   Precautions Fall Risk   Comments seizure, R hemiparesis; possible conversion disorder   Gait Functional Level of Assist    Gait Level Of Assist Contact Guard Assist   Assistive Device Front Wheel Walker   Distance (Feet) 30   # of Times Distance was Traveled 1   Deviation Bradykinetic;Decreased Heel Strike;Decreased Toe Off  (BLE shaking, end of session, fatigued)   Stairs Functional Level of Assist   Level of Assist with Stairs Contact Guard Assist   # of Stairs Climbed 8  (Up/over 6\" step in // bars 6x CGA/SBA. Progressed to 8 6\" consecutive stairs CGA with BHR)   Stairs Description Extra time;Hand rails;Limited by fatigue;Requires incidental assist;Verbal cueing;Safety concerns   Transfer Functional Level of Assist   Bed, Chair, Wheelchair Transfer Contact Guard Assist  (Lateral scooting OOB start of session, per pt preference CGA/SBA)   Bed Chair Wheelchair Transfer Description Adaptive equipment  (SPT FWW, fatigued)   Standing Lower Body Exercises   Step Up 1 set of 10;Bilateral  (6\" step in // bars CGA)   Bed Mobility    Sit to Stand Standby Assist   Neuro-Muscular Treatments   Neuro-Muscular Treatments Weight Shift Left;Weight Shift Right;Verbal Cuing;Tactile Cuing;Sequencing   Interdisciplinary Plan of Care Collaboration   IDT Collaboration with  Physical Therapist   Patient Position at End of Therapy In Bed;Call Light within " "Reach;Tray Table within Reach;Phone within Reach   Collaboration Comments POC   Strengths & Barriers   Strengths Able to follow instructions;Alert and oriented;Effective communication skills;Independent prior level of function;Motivated for self care and independence;Pleasant and cooperative;Willingly participates in therapeutic activities   Barriers Decreased endurance;Fatigue;Hemiparesis;Impaired activity tolerance;Impaired balance;Impaired functional cognition;Limited mobility         Assessment    Pt progressed to 6\" consecutive stairs, after trialing step ups and step over in // bars.  Pt was visibly fatigued at end of session, participating in in-room ambulation, with shakiness observed in BLE.  Pt required intermittent review of stair sequencing for inc safety. Pt required mod A for BLE during return to bed post tx, d/t fatigue.    Strengths: Able to follow instructions, Alert and oriented, Effective communication skills, Independent prior level of function, Motivated for self care and independence, Pleasant and cooperative, Willingly participates in therapeutic activities  Barriers: Decreased endurance, Fatigue, Hemiparesis, Impaired activity tolerance, Impaired balance, Impaired functional cognition, Limited mobility    Plan    Progressive gait endurance and speed with FWW, standing tolerance, stairs/curb navigation, LE strengthening and endurance. Consider TENS during tx if back pain is limiting activity    Passport items to be completed:  Get in/out of bed safely, in/out of a vehicle, safely use mobility device, walk or wheel around home/community, navigate up and down stairs, show how to get up/down from the ground, ensure home is accessible, demonstrate HEP, complete caregiver training    Physical Therapy Problems (Active)       Problem: Mobility       Dates: Start: 02/28/23         Goal: STG-Within one week, patient will ambulate household distance 25 ft x2 with FWW and CGA.       Dates: Start: " 02/28/23   Expected End: 03/11/23         Goal Note filed on 03/02/23 1342 by Elke Hernandez, PT       20 ft x2 with min A and wc follow              Goal: STG-Within one week, patient will ambulate up/down a curb with FWW and min A.       Dates: Start: 02/28/23   Expected End: 03/11/23         Goal Note filed on 03/02/23 1342 by Elke Hernandez, PT       Not yet assessed d/t safety concerns              Goal: STG-Within one week, patient will ambulate up/down flight of stairs with min A using BHR and RB prn.       Dates: Start: 02/28/23   Expected End: 03/11/23         Goal Note filed on 03/02/23 1342 by Elke Hernandez PT       Not yet assessed d/t safety concerns                 Problem: Mobility Transfers       Dates: Start: 02/28/23         Goal: STG-Within one week, patient will transfer bed to chair with FWW and SBA.       Dates: Start: 02/28/23   Expected End: 03/11/23         Goal Note filed on 03/02/23 1342 by Elke Hernandez PT       CGA                 Problem: PT-Long Term Goals       Dates: Start: 02/28/23         Goal: LTG-By discharge, patient will ambulate household distances at least 150 ft with FWW vs LRAD and SPV.       Dates: Start: 02/28/23   Expected End: 03/11/23            Goal: LTG-By discharge, patient will transfer one surface to another with FWW vs LRAD and SPV.       Dates: Start: 02/28/23   Expected End: 03/11/23            Goal: LTG-By discharge, patient will ambulate up/down flight of stairs with BHR and SPV requiring no RB.       Dates: Start: 02/28/23   Expected End: 03/11/23            Goal: LTG-By discharge, patient will transfer in/out of a car with FWW vs LRAD and set up A.       Dates: Start: 02/28/23   Expected End: 03/11/23

## 2023-03-10 ENCOUNTER — APPOINTMENT (OUTPATIENT)
Dept: OCCUPATIONAL THERAPY | Facility: REHABILITATION | Age: 43
DRG: 101 | End: 2023-03-10
Attending: PHYSICAL MEDICINE & REHABILITATION
Payer: MEDICAID

## 2023-03-10 ENCOUNTER — APPOINTMENT (OUTPATIENT)
Dept: PHYSICAL THERAPY | Facility: REHABILITATION | Age: 43
End: 2023-03-10
Attending: PHYSICAL MEDICINE & REHABILITATION
Payer: MEDICAID

## 2023-03-10 ENCOUNTER — APPOINTMENT (OUTPATIENT)
Dept: SPEECH THERAPY | Facility: REHABILITATION | Age: 43
DRG: 101 | End: 2023-03-10
Attending: PHYSICAL MEDICINE & REHABILITATION
Payer: MEDICAID

## 2023-03-10 PROBLEM — R53.1 ACUTE RIGHT-SIDED WEAKNESS: Status: RESOLVED | Noted: 2022-02-26 | Resolved: 2023-03-10

## 2023-03-10 PROBLEM — R79.89 AZOTEMIA: Status: RESOLVED | Noted: 2023-03-05 | Resolved: 2023-03-10

## 2023-03-10 LAB
GLUCOSE BLD STRIP.AUTO-MCNC: 129 MG/DL (ref 65–99)
GLUCOSE BLD STRIP.AUTO-MCNC: 145 MG/DL (ref 65–99)
GLUCOSE BLD STRIP.AUTO-MCNC: 82 MG/DL (ref 65–99)
GLUCOSE BLD STRIP.AUTO-MCNC: 85 MG/DL (ref 65–99)

## 2023-03-10 PROCEDURE — 97535 SELF CARE MNGMENT TRAINING: CPT

## 2023-03-10 PROCEDURE — 97110 THERAPEUTIC EXERCISES: CPT

## 2023-03-10 PROCEDURE — 97130 THER IVNTJ EA ADDL 15 MIN: CPT

## 2023-03-10 PROCEDURE — 82962 GLUCOSE BLOOD TEST: CPT | Mod: 91

## 2023-03-10 PROCEDURE — 97129 THER IVNTJ 1ST 15 MIN: CPT

## 2023-03-10 PROCEDURE — 97116 GAIT TRAINING THERAPY: CPT

## 2023-03-10 PROCEDURE — 700102 HCHG RX REV CODE 250 W/ 637 OVERRIDE(OP): Performed by: PHYSICAL MEDICINE & REHABILITATION

## 2023-03-10 PROCEDURE — 99232 SBSQ HOSP IP/OBS MODERATE 35: CPT | Performed by: HOSPITALIST

## 2023-03-10 PROCEDURE — 700111 HCHG RX REV CODE 636 W/ 250 OVERRIDE (IP): Performed by: PHYSICAL MEDICINE & REHABILITATION

## 2023-03-10 PROCEDURE — 99233 SBSQ HOSP IP/OBS HIGH 50: CPT | Performed by: PHYSICAL MEDICINE & REHABILITATION

## 2023-03-10 PROCEDURE — 97530 THERAPEUTIC ACTIVITIES: CPT

## 2023-03-10 PROCEDURE — 94760 N-INVAS EAR/PLS OXIMETRY 1: CPT

## 2023-03-10 PROCEDURE — 770010 HCHG ROOM/CARE - REHAB SEMI PRIVAT*

## 2023-03-10 PROCEDURE — A9270 NON-COVERED ITEM OR SERVICE: HCPCS | Performed by: PHYSICAL MEDICINE & REHABILITATION

## 2023-03-10 PROCEDURE — 94640 AIRWAY INHALATION TREATMENT: CPT

## 2023-03-10 RX ORDER — INSULIN GLARGINE 100 [IU]/ML
18 INJECTION, SOLUTION SUBCUTANEOUS DAILY
Qty: 9 ML | Refills: 2 | Status: ON HOLD | OUTPATIENT
Start: 2023-03-10 | End: 2023-06-11 | Stop reason: SDUPTHER

## 2023-03-10 RX ORDER — DIVALPROEX SODIUM 500 MG/1
500 TABLET, DELAYED RELEASE ORAL DAILY
Qty: 90 TABLET | Refills: 2 | Status: ON HOLD | OUTPATIENT
Start: 2023-03-11 | End: 2023-06-11 | Stop reason: SDUPTHER

## 2023-03-10 RX ORDER — ATORVASTATIN CALCIUM 20 MG/1
20 TABLET, FILM COATED ORAL EVERY EVENING
Qty: 30 TABLET | Refills: 2 | Status: ON HOLD | OUTPATIENT
Start: 2023-03-10 | End: 2023-06-11 | Stop reason: SDUPTHER

## 2023-03-10 RX ORDER — MONTELUKAST SODIUM 10 MG/1
10 TABLET ORAL EVERY EVENING
Qty: 30 TABLET | Refills: 2 | Status: ON HOLD | OUTPATIENT
Start: 2023-03-10 | End: 2023-06-11 | Stop reason: SDUPTHER

## 2023-03-10 RX ORDER — SERTRALINE HYDROCHLORIDE 100 MG/1
150 TABLET, FILM COATED ORAL
Qty: 60 TABLET | Refills: 2 | Status: ON HOLD
Start: 2023-03-10 | End: 2023-06-11

## 2023-03-10 RX ORDER — LEVOTHYROXINE SODIUM 0.2 MG/1
200 TABLET ORAL
Qty: 30 TABLET | Refills: 2 | Status: ON HOLD | OUTPATIENT
Start: 2023-03-10 | End: 2023-06-11 | Stop reason: SDUPTHER

## 2023-03-10 RX ORDER — PRAZOSIN HYDROCHLORIDE 5 MG/1
5 CAPSULE ORAL EVERY EVENING
Qty: 30 CAPSULE | Refills: 2 | Status: ON HOLD | OUTPATIENT
Start: 2023-03-10 | End: 2023-06-11 | Stop reason: SDUPTHER

## 2023-03-10 RX ORDER — DIVALPROEX SODIUM 500 MG/1
1500 TABLET, DELAYED RELEASE ORAL
Qty: 90 TABLET | Refills: 2 | Status: ON HOLD | OUTPATIENT
Start: 2023-03-10 | End: 2023-06-11 | Stop reason: SDUPTHER

## 2023-03-10 RX ORDER — TOPIRAMATE 50 MG/1
50 TABLET, FILM COATED ORAL EVERY 12 HOURS
Qty: 60 TABLET | Refills: 2 | Status: ON HOLD | OUTPATIENT
Start: 2023-03-10 | End: 2023-06-11 | Stop reason: SDUPTHER

## 2023-03-10 RX ORDER — DORZOLAMIDE HYDROCHLORIDE AND TIMOLOL MALEATE 20; 5 MG/ML; MG/ML
1 SOLUTION/ DROPS OPHTHALMIC 2 TIMES DAILY
Qty: 10 ML | Refills: 2 | Status: ON HOLD | OUTPATIENT
Start: 2023-03-10 | End: 2023-06-11 | Stop reason: SDUPTHER

## 2023-03-10 RX ORDER — LEVOTHYROXINE SODIUM 0.03 MG/1
25 TABLET ORAL
Qty: 30 TABLET | Refills: 2 | Status: ON HOLD | OUTPATIENT
Start: 2023-03-10 | End: 2023-06-11 | Stop reason: SDUPTHER

## 2023-03-10 RX ORDER — BUDESONIDE AND FORMOTEROL FUMARATE DIHYDRATE 160; 4.5 UG/1; UG/1
2 AEROSOL RESPIRATORY (INHALATION) 2 TIMES DAILY
Qty: 10.2 G | Refills: 2 | Status: ON HOLD | OUTPATIENT
Start: 2023-03-10 | End: 2023-06-11 | Stop reason: SDUPTHER

## 2023-03-10 RX ORDER — INSULIN LISPRO 100 [IU]/ML
2-12 INJECTION, SOLUTION INTRAVENOUS; SUBCUTANEOUS
Qty: 9 ML | Refills: 2 | Status: ON HOLD | OUTPATIENT
Start: 2023-03-10 | End: 2023-06-11 | Stop reason: SDUPTHER

## 2023-03-10 RX ORDER — FENOFIBRATE 134 MG/1
134 CAPSULE ORAL DAILY
Qty: 30 CAPSULE | Refills: 2 | Status: ON HOLD | OUTPATIENT
Start: 2023-03-10 | End: 2023-06-11 | Stop reason: SDUPTHER

## 2023-03-10 RX ORDER — TRAZODONE HYDROCHLORIDE 100 MG/1
100 TABLET ORAL
Qty: 30 TABLET | Refills: 2 | Status: ON HOLD | OUTPATIENT
Start: 2023-03-10 | End: 2023-06-11 | Stop reason: SDUPTHER

## 2023-03-10 RX ORDER — TRAMADOL HYDROCHLORIDE 50 MG/1
50 TABLET ORAL EVERY 8 HOURS PRN
Qty: 21 TABLET | Refills: 0 | Status: SHIPPED | OUTPATIENT
Start: 2023-03-10 | End: 2023-03-17

## 2023-03-10 RX ORDER — LEVETIRACETAM 1000 MG/1
1000 TABLET ORAL EVERY 12 HOURS
Qty: 60 TABLET | Refills: 2 | Status: ON HOLD | OUTPATIENT
Start: 2023-03-10 | End: 2023-06-11 | Stop reason: SDUPTHER

## 2023-03-10 RX ADMIN — TOPIRAMATE 50 MG: 25 TABLET, FILM COATED ORAL at 20:23

## 2023-03-10 RX ADMIN — LEVETIRACETAM 1000 MG: 500 TABLET, FILM COATED ORAL at 20:23

## 2023-03-10 RX ADMIN — PRAZOSIN HYDROCHLORIDE 5 MG: 5 CAPSULE ORAL at 20:22

## 2023-03-10 RX ADMIN — ENOXAPARIN SODIUM 40 MG: 40 INJECTION SUBCUTANEOUS at 08:51

## 2023-03-10 RX ADMIN — FENOFIBRATE 134 MG: 67 CAPSULE ORAL at 08:54

## 2023-03-10 RX ADMIN — LEVOTHYROXINE SODIUM 200 MCG: 0.12 TABLET ORAL at 05:50

## 2023-03-10 RX ADMIN — TRAZODONE HYDROCHLORIDE 100 MG: 100 TABLET ORAL at 20:24

## 2023-03-10 RX ADMIN — LEVETIRACETAM 1000 MG: 500 TABLET, FILM COATED ORAL at 08:54

## 2023-03-10 RX ADMIN — SENNOSIDES AND DOCUSATE SODIUM 2 TABLET: 50; 8.6 TABLET ORAL at 20:22

## 2023-03-10 RX ADMIN — MONTELUKAST 10 MG: 10 TABLET, FILM COATED ORAL at 20:23

## 2023-03-10 RX ADMIN — BUDESONIDE AND FORMOTEROL FUMARATE DIHYDRATE 2 PUFF: 160; 4.5 AEROSOL RESPIRATORY (INHALATION) at 20:25

## 2023-03-10 RX ADMIN — DIVALPROEX SODIUM 1500 MG: 500 TABLET, DELAYED RELEASE ORAL at 20:22

## 2023-03-10 RX ADMIN — Medication 4000 UNITS: at 20:21

## 2023-03-10 RX ADMIN — OMEPRAZOLE 20 MG: 20 CAPSULE, DELAYED RELEASE ORAL at 08:53

## 2023-03-10 RX ADMIN — TRAMADOL HYDROCHLORIDE 50 MG: 50 TABLET, COATED ORAL at 05:53

## 2023-03-10 RX ADMIN — ATORVASTATIN CALCIUM 20 MG: 10 TABLET, FILM COATED ORAL at 20:24

## 2023-03-10 RX ADMIN — LURASIDONE HYDROCHLORIDE 20 MG: 20 TABLET, FILM COATED ORAL at 17:23

## 2023-03-10 RX ADMIN — LEVOTHYROXINE SODIUM 25 MCG: 0.03 TABLET ORAL at 05:50

## 2023-03-10 RX ADMIN — SERTRALINE HYDROCHLORIDE 150 MG: 50 TABLET, FILM COATED ORAL at 20:24

## 2023-03-10 RX ADMIN — LORATADINE 10 MG: 10 TABLET ORAL at 08:54

## 2023-03-10 RX ADMIN — TOPIRAMATE 50 MG: 25 TABLET, FILM COATED ORAL at 08:54

## 2023-03-10 RX ADMIN — TRAMADOL HYDROCHLORIDE 50 MG: 50 TABLET, COATED ORAL at 20:24

## 2023-03-10 RX ADMIN — DORZOLAMIDE HYDROCHLORIDE AND TIMOLOL MALEATE 1 DROP: 20; 5 SOLUTION/ DROPS OPHTHALMIC at 08:52

## 2023-03-10 RX ADMIN — TRAMADOL HYDROCHLORIDE 50 MG: 50 TABLET, COATED ORAL at 11:19

## 2023-03-10 RX ADMIN — BUDESONIDE AND FORMOTEROL FUMARATE DIHYDRATE 2 PUFF: 160; 4.5 AEROSOL RESPIRATORY (INHALATION) at 07:34

## 2023-03-10 RX ADMIN — DIVALPROEX SODIUM 500 MG: 500 TABLET, DELAYED RELEASE ORAL at 08:53

## 2023-03-10 RX ADMIN — DORZOLAMIDE HYDROCHLORIDE AND TIMOLOL MALEATE 1 DROP: 20; 5 SOLUTION/ DROPS OPHTHALMIC at 20:25

## 2023-03-10 ASSESSMENT — ENCOUNTER SYMPTOMS
MUSCULOSKELETAL NEGATIVE: 1
CHILLS: 0
BRUISES/BLEEDS EASILY: 0
EYES NEGATIVE: 1
NAUSEA: 0
POLYDIPSIA: 0
PALPITATIONS: 0
COUGH: 0
VOMITING: 0
FEVER: 0
ABDOMINAL PAIN: 0
NERVOUS/ANXIOUS: 0
DIARRHEA: 0
SHORTNESS OF BREATH: 0

## 2023-03-10 ASSESSMENT — ACTIVITIES OF DAILY LIVING (ADL)
TUB_SHOWER_TRANSFER_DESCRIPTION: GRAB BAR;SHOWER BENCH;INCREASED TIME
TOILETING_LEVEL_OF_ASSIST: REQUIRES SUPERVISION WITH TOILETING
SHOWER_TRANSFER_LEVEL_OF_ASSIST: REQUIRES SUPERVISION WITH SHOWER TRANSFER
BED_CHAIR_WHEELCHAIR_TRANSFER_DESCRIPTION: SET-UP OF EQUIPMENT
BED_CHAIR_WHEELCHAIR_TRANSFER_DESCRIPTION: INCREASED TIME;SET-UP OF EQUIPMENT;SUPERVISION FOR SAFETY
BED_CHAIR_WHEELCHAIR_TRANSFER_DESCRIPTION: INCREASED TIME;VERBAL CUEING;SUPERVISION FOR SAFETY;SET-UP OF EQUIPMENT
TOILET_TRANSFER_LEVEL_OF_ASSIST: REQUIRES SUPERVISION WITH TOILET TRANSFER

## 2023-03-10 ASSESSMENT — BRIEF INTERVIEW FOR MENTAL STATUS (BIMS)
WHAT MONTH IS IT: ACCURATE WITHIN 5 DAYS
WHAT YEAR IS IT: CORRECT
WHAT DAY OF THE WEEK IS IT: CORRECT
ASKED TO RECALL BLUE: YES, NO CUE REQUIRED
BIMS SUMMARY SCORE: 15
ASKED TO RECALL BED: YES, NO CUE REQUIRED
INITIAL REPETITION OF BED BLUE SOCK - FIRST ATTEMPT: 3
ASKED TO RECALL SOCK: YES, NO CUE REQUIRED

## 2023-03-10 ASSESSMENT — PAIN DESCRIPTION - PAIN TYPE
TYPE: ACUTE PAIN
TYPE: ACUTE PAIN

## 2023-03-10 NOTE — CARE PLAN
Problem: OT Long Term Goals  Goal: LTG-By discharge, patient will perform bathroom transfers with supervision.  Outcome: Not Met  Goal: LTG-By discharge, patient will complete basic home management with supervision.   Outcome: Not Met     Problem: OT Long Term Goals  Goal: LTG-By discharge, patient will complete basic self care tasks with supervision.  Outcome: Met

## 2023-03-10 NOTE — CARE PLAN
"  Problem: Knowledge Deficit - Standard  Goal: Patient and family/care givers will demonstrate understanding of plan of care, disease process/condition, diagnostic tests and medications  Outcome: Progressing     Problem: Fall Risk - Rehab  Goal: Patient will remain free from falls  Outcome: Progressing  Note: Mariluz Ball Fall risk Assessment Score: 12    Moderate fall risk Interventions  - Pt refuses bed and strip alarm   - Yellow sign by the door   - Yellow wrist band \"Fall risk\"  - Room near to the nurse station  - Do not leave patient unattended in the bathroom  - Fall risk education provided     "

## 2023-03-10 NOTE — PROGRESS NOTES
Hospital Medicine Daily Progress Note        Chief Complaint  Hypertension  Diabetes    Interval Problem Update  Discussed about his BS dipping lower this am and have decreased his Gl[place] a little.    Review of Systems  Review of Systems   Constitutional:  Negative for chills and fever.   Respiratory:  Negative for shortness of breath.    Cardiovascular:  Negative for chest pain.   Gastrointestinal:  Negative for abdominal pain, diarrhea, nausea and vomiting.   Psychiatric/Behavioral:  The patient is not nervous/anxious.       Physical Exam  Temp:  [36.3 °C (97.3 °F)-37.1 °C (98.7 °F)] 36.3 °C (97.3 °F)  Pulse:  [51-98] 64  Resp:  [18-20] 18  BP: ()/(52-62) 112/62  SpO2:  [92 %-98 %] 98 %    Physical Exam  Vitals and nursing note reviewed.   Constitutional:       Appearance: Normal appearance.   HENT:      Head: Atraumatic.   Eyes:      Conjunctiva/sclera: Conjunctivae normal.      Pupils: Pupils are equal, round, and reactive to light.   Cardiovascular:      Rate and Rhythm: Normal rate and regular rhythm.   Pulmonary:      Effort: Pulmonary effort is normal.      Breath sounds: Normal breath sounds.   Abdominal:      General: Bowel sounds are normal.      Palpations: Abdomen is soft.   Musculoskeletal:      Cervical back: Normal range of motion and neck supple.      Right lower leg: No edema.      Left lower leg: No edema.   Skin:     General: Skin is warm and dry.   Neurological:      Mental Status: He is alert and oriented to person, place, and time.   Psychiatric:         Mood and Affect: Mood normal.         Behavior: Behavior normal.       Fluids    Intake/Output Summary (Last 24 hours) at 3/10/2023 0827  Last data filed at 3/10/2023 0553  Gross per 24 hour   Intake 1020 ml   Output 300 ml   Net 720 ml         Laboratory  Recent Labs     03/09/23  0524   WBC 6.7   RBC 4.55*   HEMOGLOBIN 13.8*   HEMATOCRIT 41.3*   MCV 90.8   MCH 30.3   MCHC 33.4*   RDW 41.0   PLATELETCT 233   MPV 11.3       Recent Labs      03/09/23  0524   SODIUM 143   POTASSIUM 4.2   CHLORIDE 109   CO2 24   GLUCOSE 98   BUN 22   CREATININE 0.80   CALCIUM 9.2                     Assessment/Plan  * Seizure disorder (HCC)- (present on admission)  Assessment & Plan  On Depakote and Keppra    Azotemia  Assessment & Plan  Currently resolved  Bun: 28 --> 23 --> 22  Encouraging fluid intake  Monitor    Hypothyroidism- (present on admission)  Assessment & Plan  Hx thyroidectomy  TSH: 7.55  FT4: 1.16  ? Subclinical   On Synthroid  Needs repeat TFT's as an outpatient    Primary hypertension- (present on admission)  Assessment & Plan  BP ok  Off Lisinopril  On Minipress  Monitor    Mixed hyperlipidemia- (present on admission)  Assessment & Plan  On Lipitor  On Fenofibrate    Type 2 diabetes mellitus without complication, without long-term current use of insulin (HCC)- (present on admission)  Assessment & Plan  HbaA1c: 7.8 (2/28)  BS:   Off Metformin  On Lantus 20 units bid --> 20 units qam (3/8) --> will decrease to 18 units qam (3/11)  Note: home meds include Lantus, Farxiga, Januvia, Metformin 1000 mg bid, Humalog 12 units at meals  Cont to monitor    Psychiatric problem- (present on admission)  Assessment & Plan  On Zoloft,Topamax, Latuda    Glaucoma- (present on admission)  Assessment & Plan  On Cosopt    Asthma- (present on admission)  Assessment & Plan  On Symbicort and Singulair

## 2023-03-10 NOTE — CARE PLAN
"The patient is Stable - Low risk of patient condition declining or worsening    Shift Goals  Clinical Goals: Safety  Patient Goals: Sleep    Progress made toward(s) clinical / shift goals:      Problem: Diabetes Management  Goal: Patient's ability to maintain appropriate glucose levels will be maintained or improve  Outcome: Progressing  Note: HS [id number]. No insulin coverage per sliding scale per MAR. HS snack provided and consumed. Will continue to monitor.     Problem: Fall Risk - Rehab  Goal: Patient will remain free from falls  Outcome: Progressing  Note: Mariluz Ball Fall risk Assessment Score: 12    Moderate fall risk Interventions  - Bed and strip alarm   - Yellow sign by the door   - Yellow wrist band \"Fall risk\"  - Do not leave patient unattended in the bathroom  - Fall risk education provided    Pt refusing  bed alarms. He calls appropriately when in need of assistance.     Problem: Pain - Standard  Goal: Alleviation of pain or a reduction in pain to the patient’s comfort goal  Outcome: Progressing  Note: Tramadol given PRN per MAR for c/o right hip pain with adequate relief. Pt sleeps good. Will continue to monitor.       Patient is not progressing towards the following goals:      "

## 2023-03-10 NOTE — DISCHARGE SUMMARY
Physical Medicine & Rehabilitation Discharge Summary    Admission Date: 2/27/2023    Discharge Date: 3/11/2023     Attending Provider: Paulette Morocho DO for Karan Smith MD/PhD    Admission Diagnosis:   Active Hospital Problems    Diagnosis     *Seizure disorder (HCC)     Azotemia     Acute right-sided weakness     Primary hypertension     Hypothyroidism     Mixed hyperlipidemia     Type 2 diabetes mellitus without complication, without long-term current use of insulin (HCC)     PTSD (post-traumatic stress disorder)     Psychiatric problem     Neck mass     Postoperative hypothyroidism     Anxiety and depression     Glaucoma     Asthma        Discharge Diagnosis:  Active Hospital Problems    Diagnosis     *Seizure disorder (HCC)     Azotemia     Acute right-sided weakness     Primary hypertension     Hypothyroidism     Mixed hyperlipidemia     Type 2 diabetes mellitus without complication, without long-term current use of insulin (HCC)     PTSD (post-traumatic stress disorder)     Psychiatric problem     Neck mass     Postoperative hypothyroidism     Anxiety and depression     Glaucoma     Asthma        HPI per Admission History & Physical:  Patient is a 42 y.o. male with a PMH of TBI, seizures, conversion disorder, PTSD, BPD and right sided weakness with previous CVA who presented on 2/17/23 with multiple seizure. He reportedly had 4 separate seizures. CT head was negative for acute process. MRI was negative. He reports missed some of his medication at home. He was started on Keppra in addition to his home medications and has since not had a seizure in 10 days. He was found to have large neck mass and biopsy was performed with pending pathology.      Patient was admitted to [facility] on 2/27/2023.     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  -PT and OT for mobility and ADLs. Per guidelines, 15 hours per week between PT, OT and SLP.  -Follow-up APAP/Tramadol  -Continue Keppra and Depakote. Referral to PM&R   Patient has a mobility limitation that prevents them from completing all MRADLs.  They cannot sufficiently complete MRADLs with a walker, cane or crutches.  A wheelchair will improve their ability to complete MRADLs.  The patient is willing and able to self-propel the wheelchair.  Patient also has a caregiver who is willing and able to assist with wheelchair.     HTN - Patient on Lisinopril 10 mg. Lisinopril discontinue for low SBP  -Bradycardia on 3/8, will check Magnesium. Wnl      DM2 with hyperglycemia - Patient on Lantus and SSI as well as metformin, Januvia. Blood sugars elevated, consult hospitalist. A1c 7.8. Ongoing hyperglycemia. Continue Lantus and SSI  -Lantus adjusted to 20 U BID. Discussed with hospitalist and will restart Metformin. Had low blood sugars on 3/7, discussed with hospitalist and discontinue. Will continue to monitor     HLD - Patient on Atorvastatin 20 mg QHS and Lofibra     Glaucoma - Patient on Cosopt      BPD/Mood disorder - Patient on Depakote 500/1500, Prazosin 5 mg daily, Sertraline 150 mg, Topamax 50 mg BID, Latuda 80 mg QHS and Trazodone 100 mg   -Consult Neuropsychology. Has history of suicide attempts per chart reviewed. Discussed case with Dr. Cervantes . Home dose is Latuda 20 mg, will reduce. Continue 20 mg daily     Anemia - 13.9 on admission, repeat 3/2 - 13.1. Repeat 3/9 - 13.8, near resolution     Azotemia - BUN 28 on 3/2/23. Encourage PO fluids for now. Repeat 3/9 - 22, improving     Sore throat - New on 3/9, WBC normal. Check strep PCR - negative, resolved.      Hypothyroidism - Patient on 225 mcg of Levothyroxine. TSH elevated but T4 normal.      Asthma - Patient on Singulair, Claritin, and Symbicort     Pain - APAP/Tramadol PRN     Skin - Patient at risk for skin breakdown  due to debility in areas including sacrum, achilles, elbows and head in addition to other sites. Nursing to assess skin daily.      GI Ppx - Patient on Prilosec for GERD prophylaxis. Patient on Senna-docusate for constipation prophylaxis.      DVT Ppx - Patient Lovenox on transfer. Discontinue Lovenox    Functional Status at Discharge  Eating:  Independent  Eating Description:     Grooming:  Modified Independent, Seated  Grooming Description:  Seated in wheelchair at sink  Bathing:  Standby Assist  Bathing Description:  Grab bar, Tub bench, Hand held shower, Set-up of equipment, Supervision for safety  Upper Body Dressing:  Modified Independent (donned jacket EOB)  Upper Body Dressing Description:   (doff/donned same shirt)  Lower Body Dressing:  Supervision  Lower Body Dressing Description:  Assistive devices, Set-up of equipment, Supervision for safety (donned pull ups and hospital pants EOB with FWW)     Walk:  Contact Guard Assist  Distance Walked:  70  Number of Times Distance Was Traveled:  3  Assistive Device:  Front Wheel Walker  Gait Deviation:  Bradykinetic, Other (Comment) (cues for upright posture)  Wheelchair:  Supervised  Distance Propelled:  250   Wheelchair Description:   (bilateral UE, slowly, encouragement to complete the task)  Stairs Contact Guard Assist  Stairs Description Extra time, Limited by fatigue, Supervision for safety, Verbal cueing, Walker (LLE leading asc, RLE leading desc with min VC for sequencing; tremulous RLE d/t fatigue)     Comprehension:  Supervision  Comprehension Description:  Glasses, Verbal cues  Expression:  Modified Independent  Expression Description:  Other (comment) (extra time)  Social Interaction:  Modified Independent  Social Interaction Description:  Medication  Problem Solving:  Minimal Assist  Problem Solving Description:  Bed/chair alarm, Seat belt, Increased time, Supervision, Therapy schedule, Verbal cueing  Memory:  Minimal Assist  Memory Description:   Bed/chair alarm, Seat belt, Increased time, Supervision, Therapy schedule, Verbal cueing       IPaulette D.O.,personally performed a complete drug regimen review and no potential clinically significant medication issues were identified.   Discharge Medication:     Medication List        START taking these medications        Instructions   dorzolamide-timolol 22.3-6.8 MG/ML Soln  Commonly known as: COSOPT   Administer 1 Drop into both eyes 2 times a day.  Dose: 1 Drop     insulin lispro 100 UNIT/ML Sopn injection PEN  Commonly known as: HumaLOG,AdmeLOG   Inject 2-12 Units under the skin 4 Times a Day,Before Meals and at Bedtime.  Dose: 2-12 Units     levetiracetam 1000 MG tablet  Commonly known as: KEPPRA   Take 1 Tablet by mouth every 12 hours.  Dose: 1,000 mg     topiramate 50 MG tablet  Commonly known as: TOPAMAX   Take 1 Tablet by mouth every 12 hours.  Dose: 50 mg     traMADol 50 MG Tabs  Commonly known as: Ultram   Take 1 Tablet by mouth every 8 hours as needed (Moderate Pain (NRS Pain Scale 4-6) if opiates not tolerated or not ordered) for up to 7 days.  Dose: 50 mg     traZODone 100 MG Tabs  Commonly known as: DESYREL   Take 1 Tablet by mouth at bedtime.  Dose: 100 mg            CHANGE how you take these medications        Instructions   * divalproex 500 MG Tbec  What changed: Another medication with the same name was added. Make sure you understand how and when to take each.  Commonly known as: DEPAKOTE   Take 3 Tablets by mouth at bedtime. 3 tablets = 1500 mg every evening  Dose: 1,500 mg     * divalproex 500 MG Tbec  What changed: You were already taking a medication with the same name, and this prescription was added. Make sure you understand how and when to take each.  Commonly known as: DEPAKOTE   Doctor's comments: 500 in the morning and 1500 at night.  Take 1 Tablet by mouth every day.  Dose: 500 mg     insulin glargine 100 UNIT/ML Sopn injection  What changed:   how much to take  when to  take this  Generic drug: insulin glargine   Inject 18 Units under the skin every day.  Dose: 18 Units     prazosin 5 MG Caps  What changed:   medication strength  how much to take  Commonly known as: MINIPRESS   Take 1 Capsule by mouth every evening. 2 caps = 4 mg  Dose: 5 mg           * This list has 2 medication(s) that are the same as other medications prescribed for you. Read the directions carefully, and ask your doctor or other care provider to review them with you.                CONTINUE taking these medications        Instructions   atorvastatin 20 MG Tabs  Commonly known as: LIPITOR   Take 1 Tablet by mouth every evening.  Dose: 20 mg     budesonide-formoterol 160-4.5 MCG/ACT Aero  Commonly known as: SYMBICORT   Inhale 2 Puffs 2 times a day.  Dose: 2 Puff     Fenofibrate 134 MG Caps   Take 134 mg by mouth every day.  Dose: 134 mg     fish oil 1000 MG Caps capsule   Take 1,000 mg by mouth 2 Times a Day.  Dose: 1,000 mg     * levothyroxine 200 MCG Tabs  Commonly known as: SYNTHROID   Take 1 Tablet by mouth every morning on an empty stomach. Take in addition to 25 mcg tablet for daily dose of 225 mcg  Dose: 200 mcg     * levothyroxine 25 MCG Tabs  Commonly known as: SYNTHROID   Take 1 Tablet by mouth every morning on an empty stomach. Take in addition to 200 mcg tablet for daily dose of 225 mcg  Dose: 25 mcg     lurasidone 20 MG Tabs  Commonly known as: LATUDA   Take 1 Tablet by mouth every evening.  Dose: 20 mg     montelukast 10 MG Tabs  Commonly known as: SINGULAIR   Take 1 Tablet by mouth every evening.  Dose: 10 mg     sertraline 100 MG Tabs  Commonly known as: Zoloft   Take 1.5 Tablets by mouth at bedtime. 1.5 tablets = 150 mg  Dose: 150 mg     Vitamin D3 2000 UNIT Caps   Take 4,000 Units by mouth every evening.  Dose: 4,000 Units           * This list has 2 medication(s) that are the same as other medications prescribed for you. Read the directions carefully, and ask your doctor or other care provider  to review them with you.                STOP taking these medications      docusate sodium 100 MG Caps  Commonly known as: COLACE     Jardiance 25 MG Tabs  Generic drug: Empagliflozin     lisinopril 10 MG Tabs  Commonly known as: PRINIVIL     metformin 1000 MG tablet  Commonly known as: GLUCOPHAGE     SITagliptin 100 MG Tabs  Commonly known as: JANUVIA              Discharge Diet:  Current Diet Order   Procedures    Diet Order Diet: Consistent CHO (Diabetic)       Discharge Activity:  As tolerated     Disposition:  Patient to discharge home with family support and community resources.    Equipment:  FWW, WC    Follow-up & Discharge Instructions:  Follow up with your primary care provider (PCP) within 7-10 days of discharge to review your medications and take over your care.     If you develop chest pain, fever, chills, change in neurologic function (weakness, sensation changes, vision changes), or other concerning sxs, seek immediate medical attention or call 911.      Future Appointments   Date Time Provider Department Center   3/10/2023  3:00 PM [facility] None       Condition on Discharge:  Good    More than 35 minutes was spent on discharging this patient, including face-to-face time, prescription management, and the dictation of this note.    Paulette Morocho D.O.    Date of Service: 3/11/2023

## 2023-03-10 NOTE — DISCHARGE INSTRUCTIONS
[facility] NURSING DISCHARGE INSTRUCTIONS    Blood Pressure: 111/56  Weight: 109 kg (240 lb)  Nursing recommendations for Norm Prabhakar at time of discharge are as follows:  Client and Family Member verbalized understanding of all discharge instructions and prescriptions.     Review all your home medications and newly ordered medications with your doctor and/or pharmacist. Follow medication instructions as directed by your doctor and/or pharmacist.    Pain Management:   Discharge Pain Medication Instructions:  Comfort Goal: Comfort with Movement, Sleep Comfortably  Notify your primary care provider if pain is unrelieved with these measures, if the pain is new, or increased in intensity.    Discharge Skin Characteristics: Warm, Dry  Discharge Skin Exam: Clear     Skin / Wound Care Instructions: Please contact your primary care physician for any change in skin integrity.     If You Have Surgical Incisions / Wounds:  Monitor surgical site(s) for signs of increased swelling, redness or symptoms of drainage from the site or fever as this could indicate signs and symptoms of infection. If these symptoms are noted, notifiy your primary care provider.      Discharge Safety Instructions: Should Not Be Left Alone In The House     Discharge Safety Concerns: Unsteady Gait, Weakness, Other (Comments) (hx of seizures)  The interdisciplinary team has made recommendation that you should not be left alone  in the house due to balance problem, weakness, unsteady gait, and hx of seizures  Anti-embolic stockings are required during the day and off at night to increase circulation to the lower extremities.    Discharge Diet: Diabetic     Discharge Liquids: Thin  Discharge Bowel Function: Continent  Please contact your primary care physician for any changes in bowel habits.  Discharge Bowel Program:    Discharge Bladder Function: Continent  Discharge Urinary Devices:        Nursing Discharge Plan:   Influenza  Vaccine Indication: Patient Refuses    Case Management Discharge Instructions:   Discharge Location:    Agency Name/Address/Phone:    Home Health:    Outpatient Services:    DME Provider/Phone:    Medical Equipment Ordered:    Prescription Faxed to:        Discharge Medication Instructions:  Below are the medications your physician expects you to take upon discharge:  Seizure, Adult  A seizure is a sudden burst of abnormal electrical activity in the brain. Seizures usually last from 30 seconds to 2 minutes. They can cause many different symptoms.  Usually, seizures are not harmful unless they last a long time.  What are the causes?  Common causes of this condition include:  Fever or infection.  Conditions that affect the brain, such as:  A brain abnormality that you were born with.  A brain or head injury.  Bleeding in the brain.  A tumor.  Stroke.  Brain disorders such as autism or cerebral palsy.  Low blood sugar.  Conditions that are passed from parent to child (are inherited).  Problems with substances, such as:  Having a reaction to a drug or a medicine.  Suddenly stopping the use of a substance (withdrawal).  In some cases, the cause may not be known. A person who has repeated seizures over time without a clear cause has a condition called epilepsy.  What increases the risk?  You are more likely to get this condition if you have:  A family history of epilepsy.  Had a seizure in the past.  A brain disorder.  A history of head injury, lack of oxygen at birth, or strokes.  What are the signs or symptoms?  There are many types of seizures. The symptoms vary depending on the type of seizure you have. Examples of symptoms during a seizure include:  Shaking (convulsions).  Stiffness in the body.  Passing out (losing consciousness).  Head nodding.  Staring.  Not responding to sound or touch.  Loss of bladder control and bowel control.  Some people have symptoms right before and right after a seizure happens.  Symptoms  before a seizure may include:  Fear.  Worry (anxiety).  Feeling like you may vomit (nauseous).  Feeling like the room is spinning (vertigo).  Feeling like you saw or heard something before (déjà vu).  Odd tastes or smells.  Changes in how you see. You may see flashing lights or spots.  Symptoms after a seizure happens can include:  Confusion.  Sleepiness.  Headache.  Weakness on one side of the body.  How is this treated?  Most seizures will stop on their own in under 5 minutes. In these cases, no treatment is needed. Seizures that last longer than 5 minutes will usually need treatment. Treatment can include:  Medicines given through an IV tube.  Avoiding things that are known to cause your seizures. These can include medicines that you take for another condition.  Medicines to treat epilepsy.  Surgery to stop the seizures. This may be needed if medicines do not help.  Follow these instructions at home:  Medicines  Take over-the-counter and prescription medicines only as told by your doctor.  Do not eat or drink anything that may keep your medicine from working, such as alcohol.  Activity  Do not do any activities that would be dangerous if you had another seizure, like driving or swimming. Wait until your doctor says it is safe for you to do them.  If you live in the [place] ask your local DMV (department of Ele[employer] vehicles) when you can drive.  Get plenty of rest.  Teaching others  Teach friends and family what to do when you have a seizure. They should:  Lay you on the ground.  Protect your head and body.  Loosen any tight clothing around your neck.  Turn you on your side.  Not hold you down.  Not put anything into your mouth.  Know whether or not you need emergency care.  Stay with you until you are better.    General instructions  Contact your doctor each time you have a seizure.  Avoid anything that gives you seizures.  Keep a seizure diary. Write down:  What you think caused each seizure.  What you remember  "about each seizure.  Keep all follow-up visits as told by your doctor. This is important.  Contact a doctor if:  You have another seizure.  You have seizures more often.  There is any change in what happens during your seizures.  You keep having seizures with treatment.  You have symptoms of being sick or having an infection.  Get help right away if:  You have a seizure that:  Lasts longer than 5 minutes.  Is different than seizures you had before.  Makes it harder to breathe.  Happens after you hurt your head.  You have any of these symptoms after a seizure:  Not being able to speak.  Not being able to use a part of your body.  Confusion.  A bad headache.  You have two or more seizures in a row.  You do not wake up right after a seizure.  You get hurt during a seizure.  These symptoms may be an emergency. Do not wait to see if the symptoms will go away. Get medical help right away. Call your local emergency services (911 in the [place] Do not drive yourself to the hospital.  Summary  Seizures usually last from 30 seconds to 2 minutes. Usually, they are not harmful unless they last a long time.  Do not eat or drink anything that may keep your medicine from working, such as alcohol.  Teach friends and family what to do when you have a seizure.  Contact your doctor each time you have a seizure.  This information is not intended to replace advice given to you by your health care provider. Make sure you discuss any questions you have with your health care provider.  Document Released: 06/05/2009 Document Revised: 03/06/2020 Document Reviewed: 03/06/2020  [employer] Patient Education © 2020 [employer]    Prevent Falls in Your Home    \"Falling once doubles your chance of falling again\"        -Center for Disease Control and Prevention    Falls in the home can lead to serious injury (fractures, brain injuries), hospitalizations, increased medical costs, and could even be fatal.  The good news is, there are many precautions " "you can take to avoid falls in your home and help keep you safe:     If prescribed an assistive device (walker, crutches), use as instructed by the healthcare provider\"   Remove any tripping hazards from your home, including loose cords, throw rugs and clutter  Keep a nightlight on in dark (hallways, bathrooms, etc)   Get up slowly, to make sure you feel okay before getting up  Be aware of any side effects of your medications: some medications may make you dizzy  Place a non-skid rubber mat in your shower or tub-consider a shower bench or chair if unsteady on your feet  Wear supportive shoes or non-skid socks when moving around  Start an exercise program once approved by your provider.  If you are feeling weak following a hospital stay, talk to your doctor about home health or outpatient therapy programs designed to help rebuild your strength and endurance          Speech Therapy Discharge Instructions for Norm Prabhakar    3/10/2023    Supervision: Please refer to PT/OT recommendations for supervision needs when on feet and during physical activities.   Recommend assist with medication, financial and health care management.    Recommend use of pill box to help keep track of medications.    Cognition / Communication: Help patient improve independence for memory by giving him/her enough extra time to process, and budget extra time to complete tasks.  Talk together about potential challenges to transfers and ambulation before performing them, and review strategies for safety.    Use [facility] memory strategies to reinforce new learning.    R - repeat, repeat, repeat.     W - write it down or get it in writing.     A - associate the new information with something that you already know very well.     V - visualize it, practice in your mind's eye, when you aren't able to perform the action physically.    S - say it back, out loud.  This benefits you, as you repeat the information for practice. You also are getting your " expert to check you for understanding, evaluating to see if all of the information was understood, and prompting feedback if needed.  And it slows down the exchange, buying extra time to process the information.  Allow and budget extra time to get tasks done.  When you are about to try a task that you haven’t done in a while (or struggled with the last time), think about the steps involved, try to anticipate possible challenges and identify what might give you trouble, come up with a plan to compensate of those challenges, and evaluate after the fact - to determine if your plan worked or if it needs adjustment. If you experience an outcome that you didn’t expect, think back to what may have contributed to the problem. Break complex problems down into smaller parts.   Sometimes a complicated task can be overwhelming, but if you break it down into components, you will be able to find a place where you can cope with the information.   Be patient and persistent.  Use your strategies that will help organize, filter and narrow down information so that you can deal with it effectively.    It was a pleasure to work with you. --- Lulu Leija M.S. CCC-SLP    Occupational Therapy Discharge Instructions for Norm Prabhakar    3/10/2023    Level of Assist Required for Eating: Able to Complete Eating without Assist  Level of Assist Required for Grooming: Able to Complete Grooming without Assist  Level of Assist Required for Dressing: Requires Supervision with Dressing  Level of Assist Required for Toileting: Requires Supervision with Toileting  Level of Assist Required for Toilet Transfer: Requires Supervision with Toilet Transfer  Equipment for Toilet Transfer: Grab Bars by Toilet  Level of Assist Required for Bathing: Requires Supervision with Bathing  Equipment for Bathing: Shower Chair, Grab Bars in Tub / Shower, Hand Held Shower Head  Level of Assist Required for Shower Transfer: Requires Supervision with Shower  Transfer  Equipment for Shower Transfer: Shower Chair, Grab Bars in Tub / Shower  Level of Assist Required for Home Mgmt: Requires Physical Assist with Home Management  Level of Assist Required for Meal Prep: Requires Physical Assist with Meal Preparation  Driving: May not Drive, Please Contact Physician for Further Information  Home Exercise Program: Refer to Home Exercise Program Handout for Details

## 2023-03-10 NOTE — THERAPY
Physical Therapy   Daily Treatment     Patient Name: Norm Prabhakar  Age:  42 y.o., Sex:  male  Medical Record #: [id number]  Today's Date: 3/10/2023     Precautions  Precautions: Fall Risk, Other (See Comments)  Comments: seizure, R hemiparesis; possible conversion disorder    Subjective    Patient asleep, asking to do Nu-step for last session; w/c delivered from outside vendor, willing to test w/c for home use     Objective       03/10/23 1501   PT Charge Group   PT Therapeutic Exercise (Units) 1   PT Therapeutic Activities (Units) 1   PT Total Time Spent   PT Individual Total Time Spent (Mins) 30   Precautions   Precautions Fall Risk;Other (See Comments)   Comments seizure, R hemiparesis; possible conversion disorder   Wheelchair Functional Level of Assist   Wheelchair Assist Supervised   Distance Wheelchair (Feet or Distance) 200ft x 2 bilateral UE slowly   Transfer Functional Level of Assist   Bed, Chair, Wheelchair Transfer Standby Assist   Bed Chair Wheelchair Transfer Description Set-up of equipment   Sitting Lower Body Exercises   Comments Nu-step 10min #3 for endurance   Interdisciplinary Plan of Care Collaboration   IDT Collaboration with  Occupational Therapist;Other (See Comments)   Collaboration Comments discharge planning; outside vendor provided maual w/c     Given seated and standing HEP for LE strengthening, patient refused LE there-ex practice in favor of Nu-step endurance work    Assessment    Patient feels safe and prepared for d/c tomorrow- able to manage w/c with mod I, transfers with SBA, and ambulation <50ft SBA, CGA for >50ft/uneven/unpredictable surfaces    Strengths: Able to follow instructions, Alert and oriented, Effective communication skills, Independent prior level of function, Motivated for self care and independence, Pleasant and cooperative, Willingly participates in therapeutic activities  Barriers: Decreased endurance, Fatigue, Hemiparesis, Impaired activity tolerance, Impaired  balance, Impaired functional cognition, Limited mobility    Plan    Prepare for d/c home 3/11    Passport items to be completed:  ALL COMPLETE    Physical Therapy Problems (Active)       Problem: PT-Long Term Goals       Dates: Start: 02/28/23         Goal: LTG-By discharge, patient will ambulate household distances at least 150 ft with FWW vs LRAD and SPV.       Dates: Start: 02/28/23   Expected End: 03/11/23            Goal: LTG-By discharge, patient will transfer one surface to another with FWW vs LRAD and SPV.       Dates: Start: 02/28/23   Expected End: 03/11/23            Goal: LTG-By discharge, patient will ambulate up/down flight of stairs with BHR and SPV requiring no RB.       Dates: Start: 02/28/23   Expected End: 03/11/23            Goal: LTG-By discharge, patient will transfer in/out of a car with FWW vs LRAD and set up A.       Dates: Start: 02/28/23   Expected End: 03/11/23

## 2023-03-10 NOTE — FLOWSHEET NOTE
03/10/23 0736   Vital Signs   Pulse 64   Respiration 18   Pulse Oximetry 98 %   $ Pulse Oximetry (Spot Check) Yes   Respiratory Assessment   Level of Consciousness Alert   Chest Exam   Work Of Breathing / Effort Within Normal Limits   Breath Sounds   RUL Breath Sounds Clear   RML Breath Sounds Clear   RLL Breath Sounds Clear   LEONOR Breath Sounds Clear   LLL Breath Sounds Clear   Oxygen   O2 Delivery Device None - Room Air

## 2023-03-10 NOTE — PROGRESS NOTES
"  Physical Medicine & Rehabilitation Progress Note    Encounter Date: 3/10/2023    Chief Complaint: Decreased mobility, right sided weakness    Interval Events (Subjective):  Patient sitting up in room. He reports he is doing OK. He reports hip pain from floor recovery exercises yesterday. Discussed most likely strained muscle as he has not practiced it before. He reports sore throat has resolved, strep test was negative. Discussed about discharge tomorrow. Discussed about discharge medications.     _____________________________________  Interdisciplinary Team Conference   Most recent IDT on 3/9/2023    Discharge Date/Disposition:  3/11/23  _____________________________________    Objective:  VITAL SIGNS: /62   Pulse 64   Temp 36.3 °C (97.3 °F) (Oral)   Resp 18   Ht 1.981 m (6' 6\")   Wt 109 kg (240 lb)   SpO2 98%   BMI 27.73 kg/m²   Gen: NAD  Psych: Mood and affect appropriate  CV: RRR, 0 edema  Resp: CTAB, no upper airway sounds  Abd: NTND  Neuro: AOx4, following commands    Laboratory Values:  Recent Results (from the past 72 hour(s))   POCT glucose device results    Collection Time: 03/07/23  5:04 PM   Result Value Ref Range    POC Glucose, Blood 85 65 - 99 mg/dL   POCT glucose device results    Collection Time: 03/07/23  8:03 PM   Result Value Ref Range    POC Glucose, Blood 90 65 - 99 mg/dL   POCT glucose device results    Collection Time: 03/08/23  7:34 AM   Result Value Ref Range    POC Glucose, Blood 93 65 - 99 mg/dL   POCT glucose device results    Collection Time: 03/08/23  9:00 AM   Result Value Ref Range    POC Glucose, Blood 123 (H) 65 - 99 mg/dL   POCT glucose device results    Collection Time: 03/08/23 11:53 AM   Result Value Ref Range    POC Glucose, Blood 117 (H) 65 - 99 mg/dL   POCT glucose device results    Collection Time: 03/08/23  5:11 PM   Result Value Ref Range    POC Glucose, Blood 91 65 - 99 mg/dL   POCT glucose device results    Collection Time: 03/08/23  8:47 PM   Result " Value Ref Range    POC Glucose, Blood 118 (H) 65 - 99 mg/dL   CBC WITH DIFFERENTIAL    Collection Time: 03/09/23  5:24 AM   Result Value Ref Range    WBC 6.7 4.8 - 10.8 K/uL    RBC 4.55 (L) 4.70 - 6.10 M/uL    Hemoglobin 13.8 (L) 14.0 - 18.0 g/dL    Hematocrit 41.3 (L) 42.0 - 52.0 %    MCV 90.8 81.4 - 97.8 fL    MCH 30.3 27.0 - 33.0 pg    MCHC 33.4 (L) 33.7 - 35.3 g/dL    RDW 41.0 35.9 - 50.0 fL    Platelet Count 233 164 - 446 K/uL    MPV 11.3 9.0 - 12.9 fL    Neutrophils-Polys 39.10 (L) 44.00 - 72.00 %    Lymphocytes 46.30 (H) 22.00 - 41.00 %    Monocytes 11.20 0.00 - 13.40 %    Eosinophils 1.80 0.00 - 6.90 %    Basophils 0.70 0.00 - 1.80 %    Immature Granulocytes 0.90 0.00 - 0.90 %    Nucleated RBC 0.00 /100 WBC    Neutrophils (Absolute) 2.61 1.82 - 7.42 K/uL    Lymphs (Absolute) 3.10 1.00 - 4.80 K/uL    Monos (Absolute) 0.75 0.00 - 0.85 K/uL    Eos (Absolute) 0.12 0.00 - 0.51 K/uL    Baso (Absolute) 0.05 0.00 - 0.12 K/uL    Immature Granulocytes (abs) 0.06 0.00 - 0.11 K/uL    NRBC (Absolute) 0.00 K/uL   Basic Metabolic Panel    Collection Time: 03/09/23  5:24 AM   Result Value Ref Range    Sodium 143 135 - 145 mmol/L    Potassium 4.2 3.6 - 5.5 mmol/L    Chloride 109 96 - 112 mmol/L    Co2 24 20 - 33 mmol/L    Glucose 98 65 - 99 mg/dL    Bun 22 8 - 22 mg/dL    Creatinine 0.80 0.50 - 1.40 mg/dL    Calcium 9.2 8.5 - 10.5 mg/dL    Anion Gap 10.0 7.0 - 16.0   MAGNESIUM    Collection Time: 03/09/23  5:24 AM   Result Value Ref Range    Magnesium 1.9 1.5 - 2.5 mg/dL   ESTIMATED GFR    Collection Time: 03/09/23  5:24 AM   Result Value Ref Range    GFR (CKD-EPI) 114 >60 mL/min/1.73 m 2   POCT glucose device results    Collection Time: 03/09/23  7:30 AM   Result Value Ref Range    POC Glucose, Blood 108 (H) 65 - 99 mg/dL   POCT glucose device results    Collection Time: 03/09/23 11:13 AM   Result Value Ref Range    POC Glucose, Blood 147 (H) 65 - 99 mg/dL   Group A Strep by PCR    Collection Time: 03/09/23  2:35 PM     Specimen: Throat   Result Value Ref Range    Group A Strep by PCR Not Detected Not Detected   POCT glucose device results    Collection Time: 03/09/23  5:11 PM   Result Value Ref Range    POC Glucose, Blood 119 (H) 65 - 99 mg/dL   POCT glucose device results    Collection Time: 03/09/23  8:51 PM   Result Value Ref Range    POC Glucose, Blood 125 (H) 65 - 99 mg/dL   POCT glucose device results    Collection Time: 03/10/23  7:34 AM   Result Value Ref Range    POC Glucose, Blood 82 65 - 99 mg/dL   POCT glucose device results    Collection Time: 03/10/23 11:14 AM   Result Value Ref Range    POC Glucose, Blood 145 (H) 65 - 99 mg/dL       Medications:  Scheduled Medications   Medication Dose Frequency    [START ON 3/11/2023] insulin GLARGINE  18 Units QAM INSULIN    insulin lispro  2-12 Units 4X/DAY ACHS    lurasidone  20 mg PM MEAL    senna-docusate  2 Tablet BID    omeprazole  20 mg DAILY    atorvastatin  20 mg Q EVENING    levothyroxine  200 mcg AM ES    levothyroxine  25 mcg AM ES    vitamin D3  4,000 Units Q EVENING    divalproex  1,500 mg QHS    fenofibrate micronized  134 mg DAILY    montelukast  10 mg Q EVENING    prazosin  5 mg Q EVENING    sertraline  150 mg QHS    levETIRAcetam  1,000 mg Q12HRS    divalproex  500 mg DAILY    loratadine  10 mg DAILY    topiramate  50 mg Q12HRS    traZODone  100 mg QHS    dorzolamide-timolol  1 Drop BID    budesonide-formoterol  2 Puff BID (RT)    enoxaparin (LOVENOX) injection  40 mg DAILY     PRN medications: [DISCONTINUED] insulin regular **AND** POC blood glucose manual result **AND** NOTIFY MD and PharmD **AND** Administer 20 grams of glucose (approximately 8 ounces of fruit juice) every 15 minutes PRN FSBG less than 70 mg/dL **AND** dextrose bolus, Respiratory Therapy Consult, hydrALAZINE, acetaminophen, senna-docusate **AND** polyethylene glycol/lytes **AND** magnesium hydroxide **AND** bisacodyl, mag hydrox-al hydrox-simeth, ondansetron **OR** ondansetron, traZODone,  sodium chloride, traMADol, midazolam, albuterol    Diet:  Current Diet Order   Procedures    Diet Order Diet: Consistent CHO (Diabetic)       Assessment:  Active Hospital Problems    Diagnosis     *Seizure disorder (HCC)     Acute right-sided weakness     Primary hypertension     Mixed hyperlipidemia     Type 2 diabetes mellitus without complication, without long-term current use of insulin (HCC)     PTSD (post-traumatic stress disorder)     Neck mass     Postoperative hypothyroidism     Anxiety and depression        Medical Decision Making and Plan:  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  -PT and OT for mobility and ADLs. Per guidelines, 15 hours per week between PT, OT and SLP.  -Follow-up APAP/Tramadol  -Continue Keppra and Depakote. Referral to PM&R   Patient has a mobility limitation that prevents them from completing all MRADLs.  They cannot sufficiently complete MRADLs with a walker, cane or crutches.  A wheelchair will improve their ability to complete MRADLs.  The patient is willing and able to self-propel the wheelchair.  Patient also has a caregiver who is willing and able to assist with wheelchair.    HTN - Patient on Lisinopril 10 mg. Lisinopril discontinue for low SBP  -Bradycardia on 3/8, will check Magnesium. Wnl      DM2 with hyperglycemia - Patient on Lantus and SSI as well as metformin, Januvia. Blood sugars elevated, consult hospitalist. A1c 7.8. Ongoing hyperglycemia. Continue Lantus and SSI  -Lantus adjusted to 20 U BID. Discussed with hospitalist and will restart Metformin. Had low blood sugars on 3/7, discussed with hospitalist and discontinue. Will continue to monitor     HLD - Patient on Atorvastatin 20 mg QHS and Lofibra     Glaucoma - Patient on Cosopt      BPD/Mood disorder - Patient on Depakote 500/1500, Prazosin 5 mg daily, Sertraline 150 mg,  Topamax 50 mg BID, Latuda 80 mg QHS and Trazodone 100 mg   -Consult Neuropsychology. Has history of suicide attempts per chart reviewed. Discussed case with Dr. Cervantes . Home dose is Latuda 20 mg, will reduce. Continue 20 mg daily    Anemia - 13.9 on admission, repeat 3/2 - 13.1. Repeat 3/9 - 13.8, near resolution    Azotemia - BUN 28 on 3/2/23. Encourage PO fluids for now. Repeat 3/9 - 22, improving    Sore throat - New on 3/9, WBC normal. Check strep PCR - negative, resolved.     Hypothyroidism - Patient on 225 mcg of Levothyroxine. TSH elevated but T4 normal.      Asthma - Patient on Singulair, Claritin, and Symbicort     Pain - APAP/Tramadol PRN     Skin - Patient at risk for skin breakdown due to debility in areas including sacrum, achilles, elbows and head in addition to other sites. Nursing to assess skin daily.     GI Ppx - Patient on Prilosec for GERD prophylaxis. Patient on Senna-docusate for constipation prophylaxis.      DVT Ppx - Patient Lovenox on transfer. Discontinue Lovenox  ____________________________________    T. Benjamin Smith MD./PhD.  [facility] - Physical Medicine & Rehabilitation   [facility] - Brain Injury Medicine   ____________________________________    Total time:  50 minutes. Time spent included pre-rounding review of vitals and tests, unit/floor time, face-to-face time with the patient including physical examination, care coordination, counseling of patient and/or family, ordering medications/procedures/tests, discussion with CM, PT, OT, SLP and/or other healthcare providers, and documentation in the electronic medical record. Topics discussed included discharge planning, discharge medications, strep negative, hip pain, and referral to PM&R.

## 2023-03-10 NOTE — THERAPY
Speech Language Pathology  Daily Treatment     Patient Name: Norm Prabhakar  Age:  42 y.o., Sex:  male  Medical Record #: [id number]  Today's Date: 3/10/2023     Precautions  Precautions: Fall Risk, Other (See Comments)  Comments: seizure, R hemiparesis; possible conversion disorder    Subjective    Patient pleasant and agreeable to therapy.  Patient showing increased self distraction and tangential conversation this date.     Objective       03/10/23 0901   Treatment Charges   SLP Cognitive Skill Development First 15 Minutes 1   SLP Cognitive Skill Development Additional 15 Minutes 1   SLP Total Time Spent   SLP Individual Total Time Spent (Mins) 30   Cognition   Moderate Attention Moderate (3)   Functional Memory Activities Minimal (4)   Functional Problem Solving Minimal (4)   Functional Math / Financial Management Moderate (3)   Functional Level of Assist   Comprehension Supervision   Comprehension Description Glasses;Verbal cues   Expression Modified Independent   Expression Description   (tangential, extra time.)   Social Interaction Modified Independent   Social Interaction Description Medication   Problem Solving Minimal Assist   Problem Solving Description Bed/chair alarm;Seat belt;Increased time;Supervision;Therapy schedule;Verbal cueing   Memory Minimal Assist   Memory Description Bed/chair alarm;Seat belt;Increased time;Supervision;Therapy schedule;Verbal cueing       Assessment    Patient continued check book calculation and organization task with 50% acc for attention on remaining task items with mod cues needed to improve.  Reviewed discharge recommendations with patient.  Case management had notified me previously that patient's mom had been contacted by phone  with message left to arrange family training but that she had not returned the call.     Strengths: Able to follow instructions, Alert and oriented, Supportive family, Willingly participates in therapeutic activities  Barriers: Impaired  carryover of learning, Impaired insight/denial of deficits, Impaired functional cognition    Plan    Patient is planning to discharge home with his mother tomorrow.  Recommend that he refer to PT/OT recommendations for supervision needs when on feet and during physical activities. Recommend assist with medication, financial and health care management. Recommend use of pill box to help keep track of medications.    Passport items completed:      Speech Therapy Problems (Active)       Problem: Memory STGs       Dates: Start: 03/09/23         Goal: STG-Within one week, patient will recall daily events and new training with 80% acc with min A.       Dates: Start: 03/09/23               Problem: Problem Solving STGs       Dates: Start: 03/09/23         Goal: STG-Within one week, patient will solve money management math tasks with 80% acc.  Independently.       Dates: Start: 03/09/23               Problem: Speech/Swallowing LTGs       Dates: Start: 02/28/23         Goal: LTG-By discharge, patient will solve basic problems and recall safety training with 80% acc with min A.       Dates: Start: 02/28/23   Expected End: 03/11/23

## 2023-03-10 NOTE — THERAPY
Occupational Therapy  Daily Treatment     Patient Name: Norm Prabhakar  Age:  42 y.o., Sex:  male  Medical Record #: [id number]  Today's Date: 3/10/2023     Precautions  Precautions: Fall Risk, Other (See Comments)  Comments: seizure, R hemiparesis; possible conversion disorder         Subjective    Patient was in bed upon arrival and agreeable to OT session. Patient is looking forward to going home and seeing his puppies tomorrow.      Objective       03/10/23 1331   OT Charge Group   OT Self Care / ADL (Units) 2   OT Therapy Activity (Units) 1   OT Therapeutic Exercise (Units) 1   OT Total Time Spent   OT Individual Total Time Spent (Mins) 60   Cosignature   Documentation Review Approved as originally documented and filed.   Precautions   Precautions Fall Risk;Other (See Comments)   Comments seizure, R hemiparesis; possible conversion disorder   Functional Level of Assist   Grooming Modified Independent;Seated   Grooming Description Seated in wheelchair at sink   Bathing Supervision   Bathing Description Grab bar;Hand held shower;Tub bench;Supervision for safety   Upper Body Dressing Modified Independent   Lower Body Dressing Supervision   Lower Body Dressing Description Grab bar   Bed, Chair, Wheelchair Transfer Standby Assist   Bed Chair Wheelchair Transfer Description Increased time;Set-up of equipment;Supervision for safety   Tub / Shower Transfers Standby Assist   Tub Shower Transfer Description Grab bar;Shower bench;Increased time   Sitting Upper Body Exercises   Chest Press 2 sets of 10;Bilateral;Weight (See Comments for lbs)  (1 x 10 5 lbs B UE, 1 x 10 5 lbs L UE and 3 lbs E UE)   Lat Pull 2 sets of 10;Bilateral;Weight (See Comments for lbs)  (25 lbs)   Bicep Curls 2 sets of 10;Bilateral;Weight (See Comments for lbs)  (1 x 10 5 lbs B UE, 1 x 10 5 lbs L UE and 3 lbs E UE)   Bed Mobility    Supine to Sit Modified Independent   Sit to Supine Modified Independent   Scooting Modified Independent    Interdisciplinary Plan of Care Collaboration   Patient Position at End of Therapy In Bed;Call Light within Reach;Tray Table within Reach;Phone within Reach         Assessment    Patient did well with ADL routine with supervision to mod I. Patient was SBA for transfers this session. Patient was fatigued by end of session.  Strengths: Able to follow instructions, Effective communication skills, Motivated for self care and independence, Manages pain appropriately, Pleasant and cooperative, Supportive family, Willingly participates in therapeutic activities, Alert and oriented, Independent prior level of function  Barriers: Decreased endurance, Hemiparesis, Impaired activity tolerance, Impaired balance, Limited mobility    Plan    D/c home tomorrow.     Occupational Therapy Goals (Active)       Problem: Functional Transfers       Dates: Start: 03/02/23         Goal: STG-Within one week, patient will transfer to toilet with setup and supervision       Dates: Start: 03/02/23   Expected End: 03/11/23         Goal Note filed on 03/09/23 1025 by Juan Preston OT/L       MAHAD                 Problem: OT Long Term Goals       Dates: Start: 02/28/23         Goal: LTG-By discharge, patient will perform bathroom transfers with supervision.       Dates: Start: 02/28/23   Expected End: 03/11/23            Goal: LTG-By discharge, patient will complete basic home management with supervision.        Dates: Start: 02/28/23   Expected End: 03/11/23               Problem: Toileting       Dates: Start: 03/02/23         Goal: STG-Within one week, patient will complete toileting tasks with setup and supervision       Dates: Start: 03/02/23   Expected End: 03/11/23         Goal Note filed on 03/09/23 1025 by Juan Preston OT/L       MAHAD

## 2023-03-10 NOTE — THERAPY
Physical Therapy   Daily Treatment     Patient Name: Norm Prabhakar  Age:  42 y.o., Sex:  male  Medical Record #: [id number]  Today's Date: 3/10/2023     Precautions  Precautions: Fall Risk, Other (See Comments)  Comments: seizure, R hemiparesis; possible conversion disorder    Subjective    Patient feels prepared for d/c planned for tomorrow (home with family at FWW level, requiring CGA/SBA for standing activities)     Objective       03/10/23 0931   PT Charge Group   PT Gait Training (Units) 2   PT Therapeutic Activities (Units) 2   PT Total Time Spent   PT Individual Total Time Spent (Mins) 60   Precautions   Precautions Fall Risk;Other (See Comments)   Comments seizure, R hemiparesis; possible conversion disorder   Wheelchair Functional Level of Assist   Wheelchair Assist Supervised   Distance Wheelchair (Feet or Distance) 250   Wheelchair Description   (bilateral UE, slowly, encouragement to complete the task)   Stairs Functional Level of Assist   Level of Assist with Stairs Contact Guard Assist   # of Stairs Climbed   (2in wooden block, curb step, ramp step using FWW CGA)   Transfer Functional Level of Assist   Bed, Chair, Wheelchair Transfer Standby Assist   Bed Chair Wheelchair Transfer Description Increased time;Verbal cueing;Supervision for safety;Set-up of equipment   Interdisciplinary Plan of Care Collaboration   IDT Collaboration with  Occupational Therapist;Physician   Collaboration Comments treatment planning; d/c planning- home with family at FWW level       Able to demonstrate 160ft FWW CGA with verbal cues for wayfinding in open room to navigate    Assessment    Patient able to demonstrate ambulation over wooden obstacles in gym (step, curb, ramp) with FWW CGA, easily distracted during task and requires frequent redirection to concentrated task    Strengths: Able to follow instructions, Alert and oriented, Effective communication skills, Independent prior level of function, Motivated for self care  and independence, Pleasant and cooperative, Willingly participates in therapeutic activities  Barriers: Decreased endurance, Fatigue, Hemiparesis, Impaired activity tolerance, Impaired balance, Impaired functional cognition, Limited mobility    Plan    Prepare for d/c- review HEP, review stair sequencing; patient requesting time on Nu-step for last session to address endurance needs    Passport items to be completed:  ALL COMPLETED    Physical Therapy Problems (Active)       Problem: PT-Long Term Goals       Dates: Start: 02/28/23         Goal: LTG-By discharge, patient will ambulate household distances at least 150 ft with FWW vs LRAD and SPV.       Dates: Start: 02/28/23   Expected End: 03/11/23            Goal: LTG-By discharge, patient will transfer one surface to another with FWW vs LRAD and SPV.       Dates: Start: 02/28/23   Expected End: 03/11/23            Goal: LTG-By discharge, patient will ambulate up/down flight of stairs with BHR and SPV requiring no RB.       Dates: Start: 02/28/23   Expected End: 03/11/23            Goal: LTG-By discharge, patient will transfer in/out of a car with FWW vs LRAD and set up A.       Dates: Start: 02/28/23   Expected End: 03/11/23

## 2023-03-11 VITALS
RESPIRATION RATE: 18 BRPM | BODY MASS INDEX: 27.77 KG/M2 | TEMPERATURE: 98.2 F | DIASTOLIC BLOOD PRESSURE: 56 MMHG | HEART RATE: 78 BPM | WEIGHT: 240 LBS | HEIGHT: 78 IN | SYSTOLIC BLOOD PRESSURE: 108 MMHG | OXYGEN SATURATION: 94 %

## 2023-03-11 LAB — GLUCOSE BLD STRIP.AUTO-MCNC: 83 MG/DL (ref 65–99)

## 2023-03-11 PROCEDURE — 94760 N-INVAS EAR/PLS OXIMETRY 1: CPT

## 2023-03-11 PROCEDURE — 99231 SBSQ HOSP IP/OBS SF/LOW 25: CPT | Performed by: HOSPITALIST

## 2023-03-11 PROCEDURE — 99239 HOSP IP/OBS DSCHRG MGMT >30: CPT | Performed by: PHYSICAL MEDICINE & REHABILITATION

## 2023-03-11 PROCEDURE — 700102 HCHG RX REV CODE 250 W/ 637 OVERRIDE(OP): Performed by: PHYSICAL MEDICINE & REHABILITATION

## 2023-03-11 PROCEDURE — A9270 NON-COVERED ITEM OR SERVICE: HCPCS | Performed by: PHYSICAL MEDICINE & REHABILITATION

## 2023-03-11 PROCEDURE — 82962 GLUCOSE BLOOD TEST: CPT

## 2023-03-11 RX ADMIN — DORZOLAMIDE HYDROCHLORIDE AND TIMOLOL MALEATE 1 DROP: 20; 5 SOLUTION/ DROPS OPHTHALMIC at 08:01

## 2023-03-11 RX ADMIN — FENOFIBRATE 134 MG: 67 CAPSULE ORAL at 08:00

## 2023-03-11 RX ADMIN — LEVOTHYROXINE SODIUM 25 MCG: 0.03 TABLET ORAL at 05:40

## 2023-03-11 RX ADMIN — LORATADINE 10 MG: 10 TABLET ORAL at 08:00

## 2023-03-11 RX ADMIN — TOPIRAMATE 50 MG: 25 TABLET, FILM COATED ORAL at 08:00

## 2023-03-11 RX ADMIN — LEVETIRACETAM 1000 MG: 500 TABLET, FILM COATED ORAL at 08:00

## 2023-03-11 RX ADMIN — LEVOTHYROXINE SODIUM 200 MCG: 0.12 TABLET ORAL at 05:40

## 2023-03-11 RX ADMIN — OMEPRAZOLE 20 MG: 20 CAPSULE, DELAYED RELEASE ORAL at 08:00

## 2023-03-11 RX ADMIN — DIVALPROEX SODIUM 500 MG: 500 TABLET, DELAYED RELEASE ORAL at 08:00

## 2023-03-11 RX ADMIN — BUDESONIDE AND FORMOTEROL FUMARATE DIHYDRATE 2 PUFF: 160; 4.5 AEROSOL RESPIRATORY (INHALATION) at 08:20

## 2023-03-11 ASSESSMENT — ENCOUNTER SYMPTOMS
SHORTNESS OF BREATH: 0
FEVER: 0
BLURRED VISION: 0
NAUSEA: 0
HALLUCINATIONS: 0
HEADACHES: 0
VOMITING: 0
PALPITATIONS: 0
DIZZINESS: 0

## 2023-03-11 NOTE — CARE PLAN
The patient is Stable - Low risk of patient condition declining or worsening      Problem: Knowledge Deficit - Standard  Goal: Patient and family/care givers will demonstrate understanding of plan of care, disease process/condition, diagnostic tests and medications  Outcome: Met     Problem: Discharge Barriers/Planning  Goal: Patient's continuum of care needs are met  Outcome: Met     Problem: Psychosocial  Goal: Patient's level of anxiety will decrease  Outcome: Met     Problem: Respiratory  Goal: Patient will understand use and administration of respiratory medications to improve respiratory function  Outcome: Met     Problem: Bladder / Voiding  Goal: Patient will establish and maintain bladder regimen  Outcome: Met     Problem: Bowel Elimination  Goal: Patient will participate in bowel management program  Outcome: Met     Problem: Skin Integrity  Goal: Patient's skin integrity will be maintained or improve  Outcome: Met     Problem: Self Care  Goal: Patient will have the ability to perform ADLs independently or with assistance  Outcome: Met     Problem: Mobility  Goal: Patient's capacity to carry out activities will improve  Outcome: Met     Problem: Infection  Goal: Patient will remain free from infection  Outcome: Met     Problem: VTE Prevention  Goal: Patient will remain free from venous thromboembolism (VTE)  Outcome: Met     Problem: Diabetes Management  Goal: Patient's ability to maintain appropriate glucose levels will be maintained or improve  Outcome: Met     Problem: Fall Risk - Rehab  Goal: Patient will remain free from falls  Outcome: Met     Problem: Pain - Standard  Goal: Alleviation of pain or a reduction in pain to the patient’s comfort goal  Outcome: Met

## 2023-03-11 NOTE — PROGRESS NOTES
Hospital Medicine Daily Progress Note        Chief Complaint  Hypertension  Diabetes    Interval Problem Update  Pt going home today.    Review of Systems  Review of Systems   Constitutional:  Negative for fever.   Eyes:  Negative for blurred vision.   Respiratory:  Negative for shortness of breath.    Cardiovascular:  Negative for palpitations.   Gastrointestinal:  Negative for nausea and vomiting.   Neurological:  Negative for dizziness and headaches.   Psychiatric/Behavioral:  Negative for hallucinations.       Physical Exam  Temp:  [36.3 °C (97.3 °F)-36.8 °C (98.2 °F)] 36.8 °C (98.2 °F)  Pulse:  [56-78] 78  Resp:  [16-18] 18  BP: (104-111)/(56-62) 108/56  SpO2:  [94 %-96 %] 94 %    Physical Exam  Vitals and nursing note reviewed.   Constitutional:       General: He is not in acute distress.  HENT:      Mouth/Throat:      Mouth: Mucous membranes are moist.      Pharynx: Oropharynx is clear.   Eyes:      General: No scleral icterus.  Cardiovascular:      Rate and Rhythm: Normal rate and regular rhythm.      Heart sounds: No murmur heard.  Pulmonary:      Effort: Pulmonary effort is normal.      Breath sounds: Normal breath sounds. No stridor.   Abdominal:      General: There is no distension.      Palpations: Abdomen is soft.      Tenderness: There is no abdominal tenderness.   Musculoskeletal:      Cervical back: No rigidity.      Right lower leg: No edema.      Left lower leg: No edema.   Skin:     General: Skin is warm and dry.      Findings: No rash.   Neurological:      Mental Status: He is alert and oriented to person, place, and time.   Psychiatric:         Mood and Affect: Mood normal.         Behavior: Behavior normal.       Fluids    Intake/Output Summary (Last 24 hours) at 3/11/2023 0901  Last data filed at 3/11/2023 0539  Gross per 24 hour   Intake 480 ml   Output 2400 ml   Net -1920 ml         Laboratory  Recent Labs     03/09/23  0524   WBC 6.7   RBC 4.55*   HEMOGLOBIN 13.8*   HEMATOCRIT 41.3*   MCV  90.8   MCH 30.3   MCHC 33.4*   RDW 41.0   PLATELETCT 233   MPV 11.3       Recent Labs     03/09/23  0524   SODIUM 143   POTASSIUM 4.2   CHLORIDE 109   CO2 24   GLUCOSE 98   BUN 22   CREATININE 0.80   CALCIUM 9.2                     Assessment/Plan  * Seizure disorder (HCC)- (present on admission)  Assessment & Plan  On Depakote and Keppra    Hypothyroidism- (present on admission)  Assessment & Plan  Hx thyroidectomy  TSH: 7.55  FT4: 1.16  ? Subclinical   On Synthroid  Needs repeat TFT's as an outpatient    Primary hypertension- (present on admission)  Assessment & Plan  BP ok  Off Lisinopril  On Minipress  Monitor    Mixed hyperlipidemia- (present on admission)  Assessment & Plan  On Lipitor  On Fenofibrate    Type 2 diabetes mellitus without complication, without long-term current use of insulin (HCC)- (present on admission)  Assessment & Plan  HbaA1c: 7.8 (2/28)  BS:   Off Metformin  On Lantus 20 units bid --> 20 units qam (3/8) --> 18 units qam (3/11)  Note: home meds include Lantus, Farxiga, Januvia, Metformin 1000 mg bid, Humalog 12 units at meals  Cont to monitor    Psychiatric problem- (present on admission)  Assessment & Plan  On Zoloft,Topamax, Latuda    Glaucoma- (present on admission)  Assessment & Plan  On Cosopt    Asthma- (present on admission)  Assessment & Plan  On Symbicort and Singulair

## 2023-03-11 NOTE — PROGRESS NOTES
Patient discharged to home per order.  Discharge instructions reviewed with patient and verbalize understanding and signed copies placed in chart.  Patient has all belongings; signed copy of form in chart.  Patient left facility at 1100 via GMT in stable condition

## 2023-03-11 NOTE — CARE PLAN
The patient is Stable - Low risk of patient condition declining or worsening    Shift Goals  Clinical Goals: Safety  Patient Goals: Sleep    Progress made toward(s) clinical / shift goals:      Problem: Diabetes Management  Goal: Patient's ability to maintain appropriate glucose levels will be maintained or improve  Outcome: Progressing  Note: HS [id number]. No insulin coverage per sliding scale per MAR. HS snack (chocolate pudding) provided and consumed. Will continue to monitor.     Problem: Pain - Standard  Goal: Alleviation of pain or a reduction in pain to the patient’s comfort goal  Outcome: Progressing  Note: Tramadol t tab given PRN per MAR for c/o right hip pain with adequate relief. Pt sleeps good. Will continue to monitor.       Patient is not progressing towards the following goals:    Problem: Bowel Elimination  Goal: Patient will participate in bowel management program  Outcome: Not Progressing  Note: Pt's LBM 3/8. Offered MOM but refused tonight. He took his scheduled senna tabs tonight. Will continue to monitor.

## 2023-03-11 NOTE — FLOWSHEET NOTE
03/11/23 0820   Events/Summary/Plan   Events/Summary/Plan o2 spot check   Vital Signs   Pulse 78   Respiration 18   Pulse Oximetry 94 %   $ Pulse Oximetry (Spot Check) Yes   Chest Exam   Work Of Breathing / Effort Within Normal Limits   Breath Sounds   RUL Breath Sounds Clear   RML Breath Sounds Clear   RLL Breath Sounds Clear   LEONOR Breath Sounds Clear   LLL Breath Sounds Clear   Oxygen   O2 Delivery Device None - Room Air

## 2023-04-21 NOTE — ASSESSMENT & PLAN NOTE
A1c 9  SSI and hypoglycemic protocol  
CT scan of the head and CT angiogram head and neck showed no acute finding  See above  On aspirin and statin  Exam remains inconsistent  Neurology following   EEG is abnormal - anti seizure meds restarted, unclear why they were stopped on admit   Discussed with neuro and the meds being held on admit are the likely etiology - recommendation to continue current regimen  MRI shows no acute changes  Continue to follow, slowly improving  Functional component, see previous notes and psych consult  Continue PT/OT  Case mgt looking into a group home with no stairs    
In baseline range  
Seizure precaution, aspiration precaution  Eeg abnormal  Neurology following  Restart Depakote and Topamax  
Sliding scale insulin and hypoglycemic protocol  
Suspected  Seen by psychiatry who has also seen him for this in the past  They recommend phychologic therapy which is currently not available in the acute hospital  
Unclear if psychologic stress is contributing to his sx given inconsistent exam  Continue supportive care  
resolved  
[Time Spent: ___ minutes] : I have spent [unfilled] minutes of time on the encounter.

## 2023-05-02 NOTE — ED NOTES
Per  give pt's home dose of depakote.    Quality 226: Preventive Care And Screening: Tobacco Use: Screening And Cessation Intervention: Patient screened for tobacco use and is an ex/non-smoker Detail Level: Detailed Quality 130: Documentation Of Current Medications In The Medical Record: Current Medications Documented Additional Notes: Covid-19 vaccine Quality 110: Preventive Care And Screening: Influenza Immunization: Influenza Immunization Administered during Influenza season

## 2023-05-20 ENCOUNTER — HOSPITAL ENCOUNTER (EMERGENCY)
Facility: MEDICAL CENTER | Age: 43
End: 2023-05-20
Attending: EMERGENCY MEDICINE
Payer: MEDICAID

## 2023-05-20 VITALS
RESPIRATION RATE: 15 BRPM | DIASTOLIC BLOOD PRESSURE: 69 MMHG | TEMPERATURE: 98.2 F | WEIGHT: 239.86 LBS | OXYGEN SATURATION: 98 % | BODY MASS INDEX: 27.75 KG/M2 | HEART RATE: 53 BPM | SYSTOLIC BLOOD PRESSURE: 118 MMHG | HEIGHT: 78 IN

## 2023-05-20 DIAGNOSIS — R73.9 HYPERGLYCEMIA: ICD-10-CM

## 2023-05-20 LAB
ALBUMIN SERPL BCP-MCNC: 4.6 G/DL (ref 3.2–4.9)
ALBUMIN/GLOB SERPL: 1.9 G/DL
ALP SERPL-CCNC: 77 U/L (ref 30–99)
ALT SERPL-CCNC: 11 U/L (ref 2–50)
ANION GAP SERPL CALC-SCNC: 12 MMOL/L (ref 7–16)
ANION GAP SERPL CALC-SCNC: 18 MMOL/L (ref 7–16)
APPEARANCE UR: CLEAR
AST SERPL-CCNC: 13 U/L (ref 12–45)
B-OH-BUTYR SERPL-MCNC: 0.14 MMOL/L (ref 0.02–0.27)
BASOPHILS # BLD AUTO: 0.8 % (ref 0–1.8)
BASOPHILS # BLD: 0.08 K/UL (ref 0–0.12)
BILIRUB SERPL-MCNC: 0.5 MG/DL (ref 0.1–1.5)
BILIRUB UR QL STRIP.AUTO: NEGATIVE
BUN SERPL-MCNC: 18 MG/DL (ref 8–22)
BUN SERPL-MCNC: 21 MG/DL (ref 8–22)
CALCIUM ALBUM COR SERPL-MCNC: 8.2 MG/DL (ref 8.5–10.5)
CALCIUM SERPL-MCNC: 8.4 MG/DL (ref 8.5–10.5)
CALCIUM SERPL-MCNC: 8.7 MG/DL (ref 8.5–10.5)
CHLORIDE SERPL-SCNC: 104 MMOL/L (ref 96–112)
CHLORIDE SERPL-SCNC: 110 MMOL/L (ref 96–112)
CO2 SERPL-SCNC: 16 MMOL/L (ref 20–33)
CO2 SERPL-SCNC: 22 MMOL/L (ref 20–33)
COLOR UR: YELLOW
CREAT SERPL-MCNC: 0.92 MG/DL (ref 0.5–1.4)
CREAT SERPL-MCNC: 1.05 MG/DL (ref 0.5–1.4)
EKG IMPRESSION: NORMAL
EOSINOPHIL # BLD AUTO: 0.09 K/UL (ref 0–0.51)
EOSINOPHIL NFR BLD: 1 % (ref 0–6.9)
ERYTHROCYTE [DISTWIDTH] IN BLOOD BY AUTOMATED COUNT: 42.6 FL (ref 35.9–50)
GFR SERPLBLD CREATININE-BSD FMLA CKD-EPI: 107 ML/MIN/1.73 M 2
GFR SERPLBLD CREATININE-BSD FMLA CKD-EPI: 91 ML/MIN/1.73 M 2
GLOBULIN SER CALC-MCNC: 2.4 G/DL (ref 1.9–3.5)
GLUCOSE BLD STRIP.AUTO-MCNC: 189 MG/DL (ref 65–99)
GLUCOSE BLD STRIP.AUTO-MCNC: 253 MG/DL (ref 65–99)
GLUCOSE BLD STRIP.AUTO-MCNC: 353 MG/DL (ref 65–99)
GLUCOSE SERPL-MCNC: 215 MG/DL (ref 65–99)
GLUCOSE SERPL-MCNC: 384 MG/DL (ref 65–99)
GLUCOSE UR STRIP.AUTO-MCNC: >=1000 MG/DL
HCT VFR BLD AUTO: 40.4 % (ref 42–52)
HGB BLD-MCNC: 13.7 G/DL (ref 14–18)
IMM GRANULOCYTES # BLD AUTO: 0.05 K/UL (ref 0–0.11)
IMM GRANULOCYTES NFR BLD AUTO: 0.5 % (ref 0–0.9)
KETONES UR STRIP.AUTO-MCNC: ABNORMAL MG/DL
LEUKOCYTE ESTERASE UR QL STRIP.AUTO: NEGATIVE
LYMPHOCYTES # BLD AUTO: 3.3 K/UL (ref 1–4.8)
LYMPHOCYTES NFR BLD: 35 % (ref 22–41)
MCH RBC QN AUTO: 30.4 PG (ref 27–33)
MCHC RBC AUTO-ENTMCNC: 33.9 G/DL (ref 33.7–35.3)
MCV RBC AUTO: 89.8 FL (ref 81.4–97.8)
MICRO URNS: ABNORMAL
MONOCYTES # BLD AUTO: 0.59 K/UL (ref 0–0.85)
MONOCYTES NFR BLD AUTO: 6.3 % (ref 0–13.4)
NEUTROPHILS # BLD AUTO: 5.32 K/UL (ref 1.82–7.42)
NEUTROPHILS NFR BLD: 56.4 % (ref 44–72)
NITRITE UR QL STRIP.AUTO: NEGATIVE
NRBC # BLD AUTO: 0 K/UL
NRBC BLD-RTO: 0 /100 WBC
PH UR STRIP.AUTO: 5.5 [PH] (ref 5–8)
PLATELET # BLD AUTO: 323 K/UL (ref 164–446)
PMV BLD AUTO: 11 FL (ref 9–12.9)
POTASSIUM SERPL-SCNC: 3.4 MMOL/L (ref 3.6–5.5)
POTASSIUM SERPL-SCNC: 3.7 MMOL/L (ref 3.6–5.5)
PROT SERPL-MCNC: 7 G/DL (ref 6–8.2)
PROT UR QL STRIP: NEGATIVE MG/DL
RBC # BLD AUTO: 4.5 M/UL (ref 4.7–6.1)
RBC UR QL AUTO: NEGATIVE
SODIUM SERPL-SCNC: 138 MMOL/L (ref 135–145)
SODIUM SERPL-SCNC: 144 MMOL/L (ref 135–145)
SP GR UR STRIP.AUTO: 1.04
TROPONIN T SERPL-MCNC: 21 NG/L (ref 6–19)
UROBILINOGEN UR STRIP.AUTO-MCNC: 1 MG/DL
WBC # BLD AUTO: 9.4 K/UL (ref 4.8–10.8)

## 2023-05-20 PROCEDURE — A9270 NON-COVERED ITEM OR SERVICE: HCPCS | Mod: UD | Performed by: EMERGENCY MEDICINE

## 2023-05-20 PROCEDURE — 93005 ELECTROCARDIOGRAM TRACING: CPT | Performed by: EMERGENCY MEDICINE

## 2023-05-20 PROCEDURE — 700105 HCHG RX REV CODE 258: Mod: UD | Performed by: EMERGENCY MEDICINE

## 2023-05-20 PROCEDURE — 82962 GLUCOSE BLOOD TEST: CPT

## 2023-05-20 PROCEDURE — 36415 COLL VENOUS BLD VENIPUNCTURE: CPT

## 2023-05-20 PROCEDURE — 81003 URINALYSIS AUTO W/O SCOPE: CPT

## 2023-05-20 PROCEDURE — 80048 BASIC METABOLIC PNL TOTAL CA: CPT

## 2023-05-20 PROCEDURE — 99285 EMERGENCY DEPT VISIT HI MDM: CPT

## 2023-05-20 PROCEDURE — 82010 KETONE BODYS QUAN: CPT

## 2023-05-20 PROCEDURE — 85025 COMPLETE CBC W/AUTO DIFF WBC: CPT

## 2023-05-20 PROCEDURE — 96374 THER/PROPH/DIAG INJ IV PUSH: CPT

## 2023-05-20 PROCEDURE — 84484 ASSAY OF TROPONIN QUANT: CPT

## 2023-05-20 PROCEDURE — 700102 HCHG RX REV CODE 250 W/ 637 OVERRIDE(OP): Mod: UD | Performed by: EMERGENCY MEDICINE

## 2023-05-20 PROCEDURE — 80053 COMPREHEN METABOLIC PANEL: CPT

## 2023-05-20 RX ORDER — SODIUM CHLORIDE, SODIUM LACTATE, POTASSIUM CHLORIDE, CALCIUM CHLORIDE 600; 310; 30; 20 MG/100ML; MG/100ML; MG/100ML; MG/100ML
1000 INJECTION, SOLUTION INTRAVENOUS ONCE
Status: COMPLETED | OUTPATIENT
Start: 2023-05-20 | End: 2023-05-20

## 2023-05-20 RX ORDER — POTASSIUM CHLORIDE 20 MEQ/1
40 TABLET, EXTENDED RELEASE ORAL ONCE
Status: COMPLETED | OUTPATIENT
Start: 2023-05-20 | End: 2023-05-20

## 2023-05-20 RX ADMIN — INSULIN HUMAN 4 UNITS: 100 INJECTION, SOLUTION PARENTERAL at 17:13

## 2023-05-20 RX ADMIN — POTASSIUM CHLORIDE 40 MEQ: 1500 TABLET, EXTENDED RELEASE ORAL at 18:57

## 2023-05-20 RX ADMIN — SODIUM CHLORIDE, POTASSIUM CHLORIDE, SODIUM LACTATE AND CALCIUM CHLORIDE 1000 ML: 600; 310; 30; 20 INJECTION, SOLUTION INTRAVENOUS at 15:32

## 2023-05-20 ASSESSMENT — FIBROSIS 4 INDEX: FIB4 SCORE: 0.55

## 2023-05-20 ASSESSMENT — PAIN DESCRIPTION - PAIN TYPE: TYPE: ACUTE PAIN
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
## 2023-05-20 NOTE — ED NOTES
Pt able to ambulate to [address] from the lobby. Pt changed into gown. Agree with triage note. Chart up for ERP.

## 2023-05-20 NOTE — ED TRIAGE NOTES
Chief Complaint   Patient presents with    High Blood Sugar     Patient reports he is now homeless after being kicked out of his mothers house. Patient report his blood sugar feels high today as he does not have any medications. Patient also reports drinking an energy drink today before coming in.  in triage.     Other     Patient reports out of medications for seizures due to being kicked out of his mothers house and now living at [facility]. Patient reports last dose of all medications was yesterday.

## 2023-05-21 NOTE — DISCHARGE INSTRUCTIONS
Return to the ER for any worsening elevation of blood sugar, shakiness, dizziness or lightheadedness, nausea, vomiting, abdominal pain, or for any concerns.    Please be sure that either your mom bring to your medicines, or if your mom cannot bring your medicines, please ask one of your friends to go pick your medicines up at your mother's house and bring them to at the [facility].

## 2023-05-21 NOTE — ED NOTES
Pt provided with discharge teaching and information was discussed. Pt questions answered.Pt verbalized understanding of importance to obtain insulin and how to do so. Pt verbalized understanding of when to return if symptoms worsen. Pt ambulated out of the ED.

## 2023-05-22 ENCOUNTER — APPOINTMENT (OUTPATIENT)
Dept: RADIOLOGY | Facility: MEDICAL CENTER | Age: 43
End: 2023-05-22
Attending: EMERGENCY MEDICINE
Payer: MEDICAID

## 2023-05-22 ENCOUNTER — HOSPITAL ENCOUNTER (EMERGENCY)
Facility: MEDICAL CENTER | Age: 43
End: 2023-05-23
Attending: EMERGENCY MEDICINE
Payer: MEDICAID

## 2023-05-22 DIAGNOSIS — Z79.899 SEIZURE SECONDARY TO SUBTHERAPEUTIC ANTICONVULSANT MEDICATION (HCC): ICD-10-CM

## 2023-05-22 DIAGNOSIS — G40.919 BREAKTHROUGH SEIZURE (HCC): ICD-10-CM

## 2023-05-22 DIAGNOSIS — R56.9 SEIZURE SECONDARY TO SUBTHERAPEUTIC ANTICONVULSANT MEDICATION (HCC): ICD-10-CM

## 2023-05-22 DIAGNOSIS — S09.90XA CLOSED HEAD INJURY, INITIAL ENCOUNTER: ICD-10-CM

## 2023-05-22 DIAGNOSIS — S39.012A STRAIN OF LUMBAR REGION, INITIAL ENCOUNTER: ICD-10-CM

## 2023-05-22 LAB
ALBUMIN SERPL BCP-MCNC: 4.8 G/DL (ref 3.2–4.9)
ALBUMIN/GLOB SERPL: 2.5 G/DL
ALP SERPL-CCNC: 72 U/L (ref 30–99)
ALT SERPL-CCNC: 11 U/L (ref 2–50)
ANION GAP SERPL CALC-SCNC: 16 MMOL/L (ref 7–16)
APPEARANCE UR: CLEAR
AST SERPL-CCNC: 17 U/L (ref 12–45)
BASOPHILS # BLD AUTO: 0.8 % (ref 0–1.8)
BASOPHILS # BLD: 0.09 K/UL (ref 0–0.12)
BILIRUB SERPL-MCNC: 0.6 MG/DL (ref 0.1–1.5)
BILIRUB UR QL STRIP.AUTO: NEGATIVE
BUN SERPL-MCNC: 18 MG/DL (ref 8–22)
CALCIUM ALBUM COR SERPL-MCNC: 9 MG/DL (ref 8.5–10.5)
CALCIUM SERPL-MCNC: 9.6 MG/DL (ref 8.5–10.5)
CHLORIDE SERPL-SCNC: 102 MMOL/L (ref 96–112)
CO2 SERPL-SCNC: 18 MMOL/L (ref 20–33)
COLOR UR: ABNORMAL
CREAT SERPL-MCNC: 0.96 MG/DL (ref 0.5–1.4)
EKG IMPRESSION: NORMAL
EOSINOPHIL # BLD AUTO: 0.14 K/UL (ref 0–0.51)
EOSINOPHIL NFR BLD: 1.3 % (ref 0–6.9)
ERYTHROCYTE [DISTWIDTH] IN BLOOD BY AUTOMATED COUNT: 42.9 FL (ref 35.9–50)
GFR SERPLBLD CREATININE-BSD FMLA CKD-EPI: 102 ML/MIN/1.73 M 2
GLOBULIN SER CALC-MCNC: 1.9 G/DL (ref 1.9–3.5)
GLUCOSE SERPL-MCNC: 250 MG/DL (ref 65–99)
GLUCOSE UR STRIP.AUTO-MCNC: >=1000 MG/DL
HCT VFR BLD AUTO: 39.4 % (ref 42–52)
HGB BLD-MCNC: 13.7 G/DL (ref 14–18)
IMM GRANULOCYTES # BLD AUTO: 0.06 K/UL (ref 0–0.11)
IMM GRANULOCYTES NFR BLD AUTO: 0.5 % (ref 0–0.9)
KETONES UR STRIP.AUTO-MCNC: ABNORMAL MG/DL
LEUKOCYTE ESTERASE UR QL STRIP.AUTO: NEGATIVE
LYMPHOCYTES # BLD AUTO: 4.03 K/UL (ref 1–4.8)
LYMPHOCYTES NFR BLD: 36.7 % (ref 22–41)
MCH RBC QN AUTO: 30.8 PG (ref 27–33)
MCHC RBC AUTO-ENTMCNC: 34.8 G/DL (ref 32.3–36.5)
MCV RBC AUTO: 88.5 FL (ref 81.4–97.8)
MICRO URNS: ABNORMAL
MONOCYTES # BLD AUTO: 0.88 K/UL (ref 0–0.85)
MONOCYTES NFR BLD AUTO: 8 % (ref 0–13.4)
NEUTROPHILS # BLD AUTO: 5.78 K/UL (ref 1.82–7.42)
NEUTROPHILS NFR BLD: 52.7 % (ref 44–72)
NITRITE UR QL STRIP.AUTO: NEGATIVE
NRBC # BLD AUTO: 0 K/UL
NRBC BLD-RTO: 0 /100 WBC (ref 0–0.2)
PH UR STRIP.AUTO: 5.5 [PH] (ref 5–8)
PLATELET # BLD AUTO: 331 K/UL (ref 164–446)
PMV BLD AUTO: 10.9 FL (ref 9–12.9)
POTASSIUM SERPL-SCNC: 3.7 MMOL/L (ref 3.6–5.5)
PROT SERPL-MCNC: 6.7 G/DL (ref 6–8.2)
PROT UR QL STRIP: NEGATIVE MG/DL
RBC # BLD AUTO: 4.45 M/UL (ref 4.7–6.1)
RBC UR QL AUTO: NEGATIVE
SODIUM SERPL-SCNC: 136 MMOL/L (ref 135–145)
SP GR UR STRIP.AUTO: 1.03
TROPONIN T SERPL-MCNC: 19 NG/L (ref 6–19)
UROBILINOGEN UR STRIP.AUTO-MCNC: 1 MG/DL
VALPROATE SERPL-MCNC: <2.8 UG/ML (ref 50–100)
WBC # BLD AUTO: 11 K/UL (ref 4.8–10.8)

## 2023-05-22 PROCEDURE — 80053 COMPREHEN METABOLIC PANEL: CPT

## 2023-05-22 PROCEDURE — 81003 URINALYSIS AUTO W/O SCOPE: CPT

## 2023-05-22 PROCEDURE — 93005 ELECTROCARDIOGRAM TRACING: CPT | Performed by: EMERGENCY MEDICINE

## 2023-05-22 PROCEDURE — A9270 NON-COVERED ITEM OR SERVICE: HCPCS | Mod: UD | Performed by: EMERGENCY MEDICINE

## 2023-05-22 PROCEDURE — 80164 ASSAY DIPROPYLACETIC ACD TOT: CPT

## 2023-05-22 PROCEDURE — 80177 DRUG SCRN QUAN LEVETIRACETAM: CPT

## 2023-05-22 PROCEDURE — 700111 HCHG RX REV CODE 636 W/ 250 OVERRIDE (IP): Mod: UD | Performed by: EMERGENCY MEDICINE

## 2023-05-22 PROCEDURE — 99285 EMERGENCY DEPT VISIT HI MDM: CPT

## 2023-05-22 PROCEDURE — 72100 X-RAY EXAM L-S SPINE 2/3 VWS: CPT

## 2023-05-22 PROCEDURE — 700102 HCHG RX REV CODE 250 W/ 637 OVERRIDE(OP): Mod: UD | Performed by: EMERGENCY MEDICINE

## 2023-05-22 PROCEDURE — 36415 COLL VENOUS BLD VENIPUNCTURE: CPT

## 2023-05-22 PROCEDURE — 700105 HCHG RX REV CODE 258: Mod: UD | Performed by: EMERGENCY MEDICINE

## 2023-05-22 PROCEDURE — 70450 CT HEAD/BRAIN W/O DYE: CPT

## 2023-05-22 PROCEDURE — 96374 THER/PROPH/DIAG INJ IV PUSH: CPT

## 2023-05-22 PROCEDURE — 84484 ASSAY OF TROPONIN QUANT: CPT

## 2023-05-22 PROCEDURE — 85025 COMPLETE CBC W/AUTO DIFF WBC: CPT

## 2023-05-22 RX ORDER — KETOROLAC TROMETHAMINE 30 MG/ML
15 INJECTION, SOLUTION INTRAMUSCULAR; INTRAVENOUS ONCE
Status: COMPLETED | OUTPATIENT
Start: 2023-05-22 | End: 2023-05-22

## 2023-05-22 RX ORDER — OXYCODONE HYDROCHLORIDE AND ACETAMINOPHEN 5; 325 MG/1; MG/1
1 TABLET ORAL ONCE
Status: COMPLETED | OUTPATIENT
Start: 2023-05-22 | End: 2023-05-22

## 2023-05-22 RX ORDER — DIVALPROEX SODIUM 500 MG/1
500 TABLET, EXTENDED RELEASE ORAL ONCE
Status: COMPLETED | OUTPATIENT
Start: 2023-05-22 | End: 2023-05-22

## 2023-05-22 RX ORDER — OXYCODONE HYDROCHLORIDE AND ACETAMINOPHEN 5; 325 MG/1; MG/1
1 TABLET ORAL EVERY 4 HOURS PRN
Qty: 12 TABLET | Refills: 0 | Status: SHIPPED | OUTPATIENT
Start: 2023-05-22 | End: 2023-05-27

## 2023-05-22 RX ORDER — SODIUM CHLORIDE 9 MG/ML
1000 INJECTION, SOLUTION INTRAVENOUS ONCE
Status: COMPLETED | OUTPATIENT
Start: 2023-05-22 | End: 2023-05-22

## 2023-05-22 RX ADMIN — KETOROLAC TROMETHAMINE 15 MG: 30 INJECTION, SOLUTION INTRAMUSCULAR; INTRAVENOUS at 22:01

## 2023-05-22 RX ADMIN — DIVALPROEX SODIUM 500 MG: 500 TABLET, EXTENDED RELEASE ORAL at 23:16

## 2023-05-22 RX ADMIN — OXYCODONE AND ACETAMINOPHEN 1 TABLET: 5; 325 TABLET ORAL at 23:06

## 2023-05-22 RX ADMIN — SODIUM CHLORIDE 1000 ML: 9 INJECTION, SOLUTION INTRAVENOUS at 20:30

## 2023-05-22 ASSESSMENT — FIBROSIS 4 INDEX: FIB4 SCORE: 0.49

## 2023-05-22 ASSESSMENT — PAIN DESCRIPTION - PAIN TYPE: TYPE: ACUTE PAIN

## 2023-05-23 VITALS
BODY MASS INDEX: 27.8 KG/M2 | HEIGHT: 78 IN | TEMPERATURE: 97 F | SYSTOLIC BLOOD PRESSURE: 118 MMHG | RESPIRATION RATE: 17 BRPM | OXYGEN SATURATION: 94 % | HEART RATE: 51 BPM | DIASTOLIC BLOOD PRESSURE: 77 MMHG | WEIGHT: 240.3 LBS

## 2023-05-23 ASSESSMENT — PAIN DESCRIPTION - PAIN TYPE: TYPE: ACUTE PAIN

## 2023-05-23 NOTE — ED NOTES
Pt passed dysphagia screening. Cleared to eat and drink by ERP. Pt given water and turkey sandwich.
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
## 2023-05-23 NOTE — ED NOTES
Pt discharged to home. Discharge paperwork provided. Education provided by ERP.  Given MTM resources and dry pants  Pt was given follow up instructions and prescriptions   Pt verbalized understanding of all instructions for discharge. Patient valert and oriented x 4, out of ER with all belongings via wheelchair

## 2023-05-24 LAB — LEVETIRACETAM SERPL-MCNC: <2 UG/ML (ref 10–40)

## 2023-05-26 ENCOUNTER — HOSPITAL ENCOUNTER (EMERGENCY)
Facility: MEDICAL CENTER | Age: 43
End: 2023-05-26
Attending: EMERGENCY MEDICINE
Payer: MEDICAID

## 2023-05-26 ENCOUNTER — APPOINTMENT (OUTPATIENT)
Dept: RADIOLOGY | Facility: MEDICAL CENTER | Age: 43
End: 2023-05-26
Attending: EMERGENCY MEDICINE
Payer: MEDICAID

## 2023-05-26 VITALS
DIASTOLIC BLOOD PRESSURE: 67 MMHG | HEART RATE: 43 BPM | HEIGHT: 78 IN | WEIGHT: 245.81 LBS | OXYGEN SATURATION: 98 % | RESPIRATION RATE: 16 BRPM | BODY MASS INDEX: 28.44 KG/M2 | TEMPERATURE: 97.3 F | SYSTOLIC BLOOD PRESSURE: 119 MMHG

## 2023-05-26 DIAGNOSIS — M54.50 ACUTE LEFT-SIDED LOW BACK PAIN WITHOUT SCIATICA: ICD-10-CM

## 2023-05-26 LAB
APPEARANCE UR: CLEAR
BILIRUB UR QL STRIP.AUTO: NEGATIVE
COLOR UR: YELLOW
GLUCOSE UR STRIP.AUTO-MCNC: >=1000 MG/DL
KETONES UR STRIP.AUTO-MCNC: NEGATIVE MG/DL
LEUKOCYTE ESTERASE UR QL STRIP.AUTO: NEGATIVE
MICRO URNS: ABNORMAL
NITRITE UR QL STRIP.AUTO: NEGATIVE
PH UR STRIP.AUTO: 5.5 [PH] (ref 5–8)
PROT UR QL STRIP: NEGATIVE MG/DL
RBC UR QL AUTO: NEGATIVE
SP GR UR STRIP.AUTO: 1.01
UROBILINOGEN UR STRIP.AUTO-MCNC: 0.2 MG/DL

## 2023-05-26 PROCEDURE — 81003 URINALYSIS AUTO W/O SCOPE: CPT

## 2023-05-26 PROCEDURE — 74176 CT ABD & PELVIS W/O CONTRAST: CPT

## 2023-05-26 PROCEDURE — 700102 HCHG RX REV CODE 250 W/ 637 OVERRIDE(OP): Mod: UD | Performed by: EMERGENCY MEDICINE

## 2023-05-26 PROCEDURE — 99284 EMERGENCY DEPT VISIT MOD MDM: CPT

## 2023-05-26 PROCEDURE — A9270 NON-COVERED ITEM OR SERVICE: HCPCS | Mod: UD | Performed by: EMERGENCY MEDICINE

## 2023-05-26 RX ORDER — OXYCODONE AND ACETAMINOPHEN 10; 325 MG/1; MG/1
1 TABLET ORAL ONCE
Status: COMPLETED | OUTPATIENT
Start: 2023-05-26 | End: 2023-05-26

## 2023-05-26 RX ORDER — OXYCODONE HYDROCHLORIDE AND ACETAMINOPHEN 5; 325 MG/1; MG/1
1 TABLET ORAL EVERY 4 HOURS PRN
Qty: 20 TABLET | Refills: 0 | Status: SHIPPED | OUTPATIENT
Start: 2023-05-26 | End: 2023-05-31

## 2023-05-26 RX ADMIN — IBUPROFEN 800 MG: 200 TABLET, FILM COATED ORAL at 10:00

## 2023-05-26 RX ADMIN — OXYCODONE AND ACETAMINOPHEN 1 TABLET: 10; 325 TABLET ORAL at 10:00

## 2023-05-26 ASSESSMENT — PAIN DESCRIPTION - PAIN TYPE
TYPE: ACUTE PAIN
TYPE: ACUTE PAIN

## 2023-05-26 ASSESSMENT — FIBROSIS 4 INDEX: FIB4 SCORE: 0.62

## 2023-05-26 NOTE — ED PROVIDER NOTES
ED Provider Note    CHIEF COMPLAINT  Chief Complaint   Patient presents with    Flank Pain     L flank pain X 2 days, non-radiating, pt denies trauma, denies blood in urine or dysuria, denies fevers        EXTERNAL RECORDS REVIEWED  PDMP no controlled substance prescriptions    HPI/ROS  LIMITATION TO HISTORY   Select: : None  OUTSIDE HISTORIAN(S):  None    Norm Prabhakar is a 42 y.o. male who presents with past medical history significant for anxiety, asthma, bipolar 1 disorder, diabetes, he presents stating that he has severe left lower back and left flank pain for 2 days.  He denies any trauma.  He does not know of any incident that caused the pain.  He denies fever, no vomiting, he ate this morning.  He denies any abdominal pain.  He denies any urinary symptoms.    PAST MEDICAL HISTORY   has a past medical history of Anxiety, ASTHMA, Bipolar 1 disorder ([facility]), Depression, Diabetes ([facility]), Fall, Glaucoma, Glaucoma (1982), Hypothyroidism, Indigestion, Mental disorder, Murmur, Pneumonia, Psychiatric problem (2002), S/P thyroidectomy, Seizure ([facility]) (2010), Seizure disorder ([facility]), and Unspecified disorder of thyroid.    SURGICAL HISTORY   has a past surgical history that includes eye surgery; thyroid lobectomy; and other.    FAMILY HISTORY  Family History   Problem Relation Age of Onset    Hypertension Mother     Heart Disease Mother     Lung Disease Mother     Stroke Maternal Grandmother        SOCIAL HISTORY  Social History     Tobacco Use    Smoking status: Former     Packs/day: 0.25     Types: Cigarettes, Cigars     Quit date: 4/1/2020     Years since quitting: 3.1    Smokeless tobacco: Never    Tobacco comments:     3 cigarettes a day   Vaping Use    Vaping Use: Every day    Substances: Nicotine, Flavoring   Substance and Sexual Activity    Alcohol use: Not Currently    Drug use: Yes     Types: Inhaled     Comment: marijuana    Sexual activity: Not on file       CURRENT MEDICATIONS  Home Medications        "Reviewed by Juan Nolan R.N. (Registered Nurse) on 05/26/23 at 0844  Med List Status: Not Addressed     Medication Last Dose Status   atorvastatin (LIPITOR) 20 MG Tab  Active   budesonide-formoterol (SYMBICORT) 160-4.5 MCG/ACT Aerosol  Active   Cholecalciferol (VITAMIN D3) 2000 UNIT Cap  Active   divalproex (DEPAKOTE) 500 MG Tablet Delayed Response  Active   divalproex (DEPAKOTE) 500 MG Tablet Delayed Response  Active   dorzolamide-timolol (COSOPT) 22.3-6.8 MG/ML Solution  Active   Fenofibrate 134 MG Cap  Active   insulin glargine 100 UNIT/ML Solution Pen-injector injection  Active   insulin lispro 100 UNIT/ML SC SOPN injection PEN  Active   levETIRAcetam (KEPPRA) 1000 MG tablet  Active   levothyroxine (SYNTHROID) 200 MCG Tab  Active   levothyroxine (SYNTHROID) 25 MCG Tab  Active   lurasidone (LATUDA) 20 MG Tab  Active   montelukast (SINGULAIR) 10 MG Tab  Active   Omega-3 Fatty Acids (FISH OIL) 1000 MG Cap capsule  Active   oxyCODONE-acetaminophen (PERCOCET) 5-325 MG Tab  Active   prazosin (MINIPRESS) 5 MG Cap  Active   sertraline (ZOLOFT) 100 MG Tab  Active   topiramate (TOPAMAX) 50 MG tablet  Active   traZODone (DESYREL) 100 MG Tab  Active                    ALLERGIES  Allergies   Allergen Reactions    Abilify      \"Feeling tired, like I don't even know whats going on around me\"    Fish      Pt reports salmon causes him to be sick to his stomach  Not listed on MAR noted 2/3/2021      Geodon [Ziprasidone Hcl] Anaphylaxis     Anaphylaxis per patient    Aripiprazole      \"I became lethargic.\"    Ziprasidone        PHYSICAL EXAM  VITAL SIGNS: /65   Pulse (!) 45   Temp 35.9 °C (96.6 °F) (Temporal)   Resp 15   Ht 1.981 m (6' 6\")   Wt 112 kg (245 lb 13 oz)   SpO2 94%   BMI 28.41 kg/m²    Constitutional: Alert.  HENT: No signs of trauma, Bilateral external ears normal, Nose normal.   Eyes: Pupils are equal and reactive, Conjunctiva normal, Non-icteric.   Neck: Normal range of motion, No tenderness, " Supple, No stridor.   Lymphatic: No lymphadenopathy noted.   Cardiovascular: Regular rate and rhythm, no murmurs.   Thorax & Lungs: Normal breath sounds, No respiratory distress, No wheezing, No chest tenderness.   Abdomen: Bowel sounds normal, Soft, No tenderness, No peritoneal signs, No masses, No pulsatile masses.   Skin: Warm, Dry, No erythema, No rash.   Back: No bony tenderness, No CVA tenderness.   Extremities: Intact distal pulses, No edema, No tenderness, No cyanosis.  Musculoskeletal: Good range of motion in all major joints. No tenderness to palpation or major deformities noted.   Neurologic: Alert , Normal motor function, Normal sensory function, No focal deficits noted.  5/5 strength in lower extremities, no evidence of spinal cord compression.  Psychiatric: Affect normal, Judgment normal, Mood normal.       DIAGNOSTIC STUDIES / PROCEDURES      LABS  Labs Reviewed   URINALYSIS - Abnormal; Notable for the following components:       Result Value    Glucose >=1000 (*)     All other components within normal limits         RADIOLOGY  I have independently interpreted the diagnostic imaging associated with this visit and am waiting the final reading from the radiologist.   My preliminary interpretation is as follows: No hydronephrosis  Radiologist interpretation:     CT-RENAL COLIC EVALUATION(A/P W/O)   Final Result      Negative for renal calculi, ureteral calculi, or hydronephrosis      Bilateral L5 spondylolysis with associated L5-S1 spondylolisthesis            COURSE & MEDICAL DECISION MAKING    ED Observation Status? Yes; I am placing the patient in to an observation status due to a diagnostic uncertainty as well as therapeutic intensity. Patient placed in observation status at 9:53 AM, 5/26/2023.     Observation plan is as follows: The patient presents with flank pain, differential diagnose includes UTI, kidney stone, musculoskeletal pain.  He currently has no evidence of spinal cord compression.  CT  scan was ordered to evaluate for possible kidney stone.  UA was ordered.  The patient was given Percocet and ibuprofen for pain.    Upon Reevaluation, the patient's condition has: Improved; and will be discharged.    Patient discharged from ED Observation status at 1:23 PM (Time) May 26, 2023 (Date).     INITIAL ASSESSMENT, COURSE AND PLAN  Care Narrative: The patient was given Percocet p.o., CT scan was ordered to evaluate for possible kidney stone, UA was ordered.        ADDITIONAL PROBLEM LIST  Bipolar disorder, diabetes, seizure disorder  DISPOSITION AND DISCUSSIONS    The patient's urine is positive for glucose, negative for ketones, negative for infection or blood.  CT scan does not show hydronephrosis.  The patient feels improved after Percocet and ibuprofen.  He most likely is suffering from musculoskeletal low back pain.  He has no evidence of spinal cord compression.  The patient will be referred to a pain specialist with walk-in clinic.        I have discussed management of the patient with the following physicians and MANNY's: None    Discussion of management with other QHP or appropriate source(s): None     Escalation of care considered, and ultimately not performed:acute inpatient care management, however at this time, the patient is most appropriate for outpatient management    Barriers to care at this time, including but not limited to:  None .     Decision tools and prescription drugs considered including, but not limited to: Pain Medications Percocet .    I reviewed prescription monitoring program for patient's narcotic use before prescribing a scheduled drug.The patient will not drink alcohol nor drive with prescribed medications. The patient will return for new or worsening symptoms and is stable at the time of discharge.    The patient is referred to a primary physician for blood pressure management, diabetic screening, and for all other preventative health concerns.    In prescribing controlled  substances to this patient, I certify that I have obtained and reviewed the medical history of Norm Prabhakar. I have also made a good nori effort to obtain applicable records from other providers who have treated the patient and records did not demonstrate any increased risk of substance abuse that would prevent me from prescribing controlled substances.     I have conducted a physical exam and documented it. I have reviewed Mr. Prabhakar’s prescription history as maintained by the [place] Prescription Monitoring Program.     I have assessed the patient’s risk for abuse, dependency, and addiction using the validated Opioid Risk Tool available at https://www.mdcalc.com/ptczcc-aepa-zbru-ort-narcotic-abuse.     Given the above, I believe the benefits of controlled substance therapy outweigh the risks. The reasons for prescribing controlled substances include non-narcotic, oral analgesic alternatives have been inadequate for pain control. Accordingly, I have discussed the risk and benefits, treatment plan, and alternative therapies with the patient.       DISPOSITION:  Patient will be discharged home in stable condition.    FOLLOW UP:  [facility], Emergency Dept  [address] [facility] [id number] [phone]  Follow up  If symptoms worsen    [place] [address] [address] [facility] [address]  [phone]  Follow up  call for follow up to establish a primary care doctor if you do not already have a primary care doctor    Georgia aYncey M.D.  [address] [facility] [id number] [phone]    Follow up  This pain clinic is walk-in Monday through Friday 95.      OUTPATIENT MEDICATIONS:  New Prescriptions    OXYCODONE-ACETAMINOPHEN (PERCOCET) 5-325 MG TAB    Take 1 Tablet by mouth every four hours as needed (pain) for up to 5 days. No driving, no drinking alcohol         FINAL DIAGNOSIS  1. Acute left-sided low back pain without sciatica           Electronically signed by: Addy  BRAXTON Rivera M.D., 5/26/2023 9:31 AM

## 2023-05-26 NOTE — ED NOTES
Pt educated on discharge instructions. All questions & concerns addressed. Vitals re-assessed and at pt's baseline. Pt ambulates to exit with steady gait and all belongings.    No IV to d/c    Pt provided food/drink as he missed lunch at his shelter

## 2023-05-26 NOTE — ED NOTES
Pt ambulated to the room with steady gait and without assistance. Changed into a gown and placed on monitor. Call light within reach. No further complaints at this time. Chart up for ERP.    Pt unable to provide urine sample at this time, stating he used RR prior to arrival in ED.

## 2023-05-26 NOTE — ED NOTES
Pt medicated per MAR. Pt back to room from restroom with steady gait. UA collected and sent to lab.

## 2023-05-26 NOTE — ED TRIAGE NOTES
"Chief Complaint   Patient presents with    Flank Pain     L flank pain X 2 days, non-radiating, pt denies trauma, denies blood in urine or dysuria, denies fevers      Pt ambulatory to triage for above complaints, VSS on RA, GCS 15, NAD.     Pt returned to lobby. Educated on triage process and to inform staff of any changes.     /78   Pulse (!) 57   Temp 35.9 °C (96.6 °F) (Temporal)   Resp 16   Ht 1.981 m (6' 6\")   Wt 112 kg (245 lb 13 oz)   SpO2 99%   BMI 28.41 kg/m²     "

## 2023-05-27 ENCOUNTER — HOSPITAL ENCOUNTER (EMERGENCY)
Facility: MEDICAL CENTER | Age: 43
End: 2023-05-27
Attending: EMERGENCY MEDICINE
Payer: MEDICAID

## 2023-05-27 VITALS
OXYGEN SATURATION: 97 % | SYSTOLIC BLOOD PRESSURE: 105 MMHG | RESPIRATION RATE: 15 BRPM | HEIGHT: 78 IN | TEMPERATURE: 97.2 F | WEIGHT: 244.49 LBS | DIASTOLIC BLOOD PRESSURE: 65 MMHG | BODY MASS INDEX: 28.29 KG/M2 | HEART RATE: 55 BPM

## 2023-05-27 DIAGNOSIS — M54.50 LUMBAR BACK PAIN: ICD-10-CM

## 2023-05-27 LAB — GLUCOSE BLD STRIP.AUTO-MCNC: 131 MG/DL (ref 65–99)

## 2023-05-27 PROCEDURE — 700101 HCHG RX REV CODE 250: Mod: UD | Performed by: EMERGENCY MEDICINE

## 2023-05-27 PROCEDURE — 82962 GLUCOSE BLOOD TEST: CPT

## 2023-05-27 PROCEDURE — 99283 EMERGENCY DEPT VISIT LOW MDM: CPT

## 2023-05-27 RX ORDER — LIDOCAINE 50 MG/G
1 PATCH TOPICAL EVERY 24 HOURS
Status: DISCONTINUED | OUTPATIENT
Start: 2023-05-27 | End: 2023-05-27 | Stop reason: HOSPADM

## 2023-05-27 RX ORDER — CYCLOBENZAPRINE HCL 10 MG
10 TABLET ORAL 3 TIMES DAILY PRN
Qty: 30 TABLET | Refills: 0 | Status: ON HOLD | OUTPATIENT
Start: 2023-05-27 | End: 2023-06-11 | Stop reason: SDUPTHER

## 2023-05-27 RX ADMIN — LIDOCAINE 1 PATCH: 50 PATCH TOPICAL at 13:49

## 2023-05-27 ASSESSMENT — FIBROSIS 4 INDEX: FIB4 SCORE: 0.62

## 2023-05-27 ASSESSMENT — PAIN DESCRIPTION - PAIN TYPE: TYPE: ACUTE PAIN

## 2023-05-27 NOTE — ED TRIAGE NOTES
Norm Prabhakar  42 y.o.  Chief Complaint   Patient presents with    Low Back Pain     LEFT-sided  Worsening since yesterday  Concurrent tingling radiating down LLE  Describes as 10/10 pressure       Ambulatory to triage with steady gait for above. A & O x 4, GCS 15.    Was seen in this ED yesterday for the same - states that he did NOT take his prescribed medications for pain before coming in today.    Triage process explained to patient, apologized for wait time, and returned to viral.

## 2023-05-27 NOTE — DISCHARGE INSTRUCTIONS
Take Tylenol 650 mg every 4 hours as needed for pain    Take ibuprofen 600 mg every 6 hours with food as needed for pain    Flexeril as prescribed as needed for muscle spasm/pain    Lidocaine patch/Salonpas over the left lumbar area as needed for pain    Take Percocet that you were prescribed yesterday as needed for severe pain that is not controlled by plain Tylenol, ibuprofen, lidocaine patches, and Flexeril.    Follow-up with a primary care provider for recheck of your back this week    Return to the ER for fever, chills, weakness, or other concerns

## 2023-05-27 NOTE — ED PROVIDER NOTES
ED Provider Note    CHIEF COMPLAINT  Chief Complaint   Patient presents with    Low Back Pain     LEFT-sided  Worsening since yesterday  Concurrent tingling radiating down LLE  Describes as 10/10 pressure       EXTERNAL RECORDS REVIEWED  Other ER note from yesterday, 5/26/2023, reviewed.  Patient presented for left flank/low back pain.  Patient had a CT renal colic study that was negative for renal issue but did note spondylolisthesis at L5-S1.  Glucose noted in the urine.  Patient also seen on 5/22 for seizure, 5/20 for hyperglycemia,    HPI/ROS  LIMITATION TO HISTORY   Select: : None  OUTSIDE HISTORIAN(S):  none    Norm Prabhakar is a 42 y.o. diabetic male with history of dyslipidemia, thyroid disorder, seizure disorder, anxiety, and bipolar disorder who presents for worsening left sided low back pain.    Patient states he was unable to sleep secondary to the pain.  He picked up the Percocet he was prescribed yesterday just now and has not taken any of it.    Patient denies trauma, fever, chills, urinary symptoms, weakness, saddle anesthesia, IVDU.    Patient states he has not checked his blood sugar in over 1 month.  He left his glucometer at his mother's who lives out of town and was taken off of his metformin during a recent stay at a SNF.  He takes Lantus currently.    PAST MEDICAL HISTORY   has a past medical history of Anxiety, ASTHMA, Bipolar 1 disorder ([facility]), Depression, Diabetes ([facility]), Fall, Glaucoma, Glaucoma (1982), Hypothyroidism, Indigestion, Mental disorder, Murmur, Pneumonia, Psychiatric problem (2002), S/P thyroidectomy, Seizure ([facility]) (2010), Seizure disorder ([facility]), and Unspecified disorder of thyroid.    SURGICAL HISTORY   has a past surgical history that includes eye surgery; thyroid lobectomy; and other.    FAMILY HISTORY  Family History   Problem Relation Age of Onset    Hypertension Mother     Heart Disease Mother     Lung Disease Mother     Stroke Maternal Grandmother        SOCIAL  "HISTORY  Social History     Tobacco Use    Smoking status: Former     Packs/day: 0.25     Types: Cigarettes, Cigars     Quit date: 4/1/2020     Years since quitting: 3.1    Smokeless tobacco: Never    Tobacco comments:     3 cigarettes a day   Vaping Use    Vaping Use: Every day    Substances: Nicotine, Flavoring   Substance and Sexual Activity    Alcohol use: Not Currently    Drug use: Yes     Types: Inhaled     Comment: marijuana    Sexual activity: Not on file       CURRENT MEDICATIONS  Home Medications       Reviewed by Deyanira Bell R.N. (Registered Nurse) on 05/27/23 at 1300  Med List Status: Partial     Medication Last Dose Status   atorvastatin (LIPITOR) 20 MG Tab  Active   budesonide-formoterol (SYMBICORT) 160-4.5 MCG/ACT Aerosol  Active   Cholecalciferol (VITAMIN D3) 2000 UNIT Cap  Active   divalproex (DEPAKOTE) 500 MG Tablet Delayed Response  Active   divalproex (DEPAKOTE) 500 MG Tablet Delayed Response  Active   dorzolamide-timolol (COSOPT) 22.3-6.8 MG/ML Solution  Active   Fenofibrate 134 MG Cap  Active   insulin glargine 100 UNIT/ML Solution Pen-injector injection  Active   insulin lispro 100 UNIT/ML SC SOPN injection PEN  Active   levETIRAcetam (KEPPRA) 1000 MG tablet  Active   levothyroxine (SYNTHROID) 200 MCG Tab  Active   levothyroxine (SYNTHROID) 25 MCG Tab  Active   lurasidone (LATUDA) 20 MG Tab  Active   montelukast (SINGULAIR) 10 MG Tab  Active   Omega-3 Fatty Acids (FISH OIL) 1000 MG Cap capsule  Active   oxyCODONE-acetaminophen (PERCOCET) 5-325 MG Tab  Active   oxyCODONE-acetaminophen (PERCOCET) 5-325 MG Tab  Active   prazosin (MINIPRESS) 5 MG Cap  Active   sertraline (ZOLOFT) 100 MG Tab  Active   topiramate (TOPAMAX) 50 MG tablet  Active   traZODone (DESYREL) 100 MG Tab  Active                    ALLERGIES  Allergies   Allergen Reactions    Abilify      \"Feeling tired, like I don't even know whats going on around me\"    Fish      Pt reports salmon causes him to be sick to his stomach  Not " "listed on MAR noted 2/3/2021      Geodon [Ziprasidone Hcl] Anaphylaxis     Anaphylaxis per patient    Aripiprazole      \"I became lethargic.\"    Ziprasidone        PHYSICAL EXAM  VITAL SIGNS: /66   Pulse 71   Temp 36.1 °C (97 °F) (Temporal)   Resp 16   Ht 1.981 m (6' 6\")   Wt 111 kg (244 lb 7.8 oz)   SpO2 98%   BMI 28.25 kg/m²    General:  WDWN male, nontoxic appearing in NAD; A+Ox3; V/S as above; afebrile, not tachycardic or hypotensive; basketball at the bedside  Skin: warm and dry; good color; no rash  HEENT: AT; EOMs intact; PERRL; no scleral icterus   Neck: FROM; soft, no midline tenderness  Cardiovascular: Regular heart rate and rhythm.  No murmurs, rubs, or gallops; pulses 2+ bilaterally radially and DP areas  Lungs: No respiratory distress or tachypnea; Clear to auscultation with good air movement bilaterally.  No wheezes, rhonchi, or rales.   Abdomen: BS present; soft; NTND; no rebound, guarding, or rigidity.  No organomegaly or pulsatile mass; no CVAT; no mottling  Back: No midline tenderness or step-offs; negative straight leg raise  Extremities: TOLBERT x 4; no e/o trauma; no pedal edema; neg Florencio's  Neurologic: CNs III-XII grossly intact; speech clear; distal sensation intact; dorsi/plantarflexion of the great toes is 5 out of 5 bilaterally; DTRs 2+ bilaterally in patellar areas; gait steady per staff who walked him up to purple pod  Psychiatric: Appropriate affect, normal mood      DIAGNOSTIC STUDIES / PROCEDURES  LABS  Accucheck 131    COURSE & MEDICAL DECISION MAKING    ED Observation Status? No; Patient does not meet criteria for ED Observation.     INITIAL ASSESSMENT, COURSE AND PLAN  Care Narrative: This is a 42-year-old diabetic male with seizure disorder, anxiety, bipolar disorder who presents for the second time in 24 hours for left lumbar back pain.  Patient had a CT yesterday that demonstrated no renal issues including no stones.  Urinalysis showed no blood, evidence of UTI but did " show glucosuria.  Patient reports not having checked his blood sugar for several weeks.  Patient exhibits no red flag signs or symptoms with regards to back pain.  I do not suspect epidural abscess or cauda equina syndrome.  Patient has not taken the Percocet he was prescribed yesterday, he just picked it up at the pharmacy on his way to the ER.    I do not feel emergent imaging is indicated.  Patient did have spondylolisthesis noted on his CT yesterday.  I will add Flexeril to his pain regimen and encouraged him to obtain lidocaine patches, take plain Tylenol not in conjunction with Percocet, and ibuprofen.  He should follow-up with a PCP.  I will place a referral.    Accu-Chek 131    I contacted the clinical pharmacist regarding the flag for muscle relaxants as interacting with ziprasidone and causing anaphylaxis.  She stated this is unverified and interacts for CNS depression rather than anaphylaxis.      FINAL DIAGNOSIS  1. Lumbar back pain      Electronically signed by: Jayshree Neely M.D., 5/27/2023 1:05 PM

## 2023-05-27 NOTE — ED NOTES
Pt provided d/c instructions and verbalizes understanding with no further questions. Rx sent to pt's requested pharmacy. Home care instructions explained. Pt ambulatory to ED viral

## 2023-06-02 ENCOUNTER — HOSPITAL ENCOUNTER (EMERGENCY)
Facility: MEDICAL CENTER | Age: 43
End: 2023-06-02
Attending: STUDENT IN AN ORGANIZED HEALTH CARE EDUCATION/TRAINING PROGRAM
Payer: MEDICAID

## 2023-06-02 VITALS
TEMPERATURE: 96.7 F | WEIGHT: 242 LBS | OXYGEN SATURATION: 96 % | HEIGHT: 74 IN | HEART RATE: 53 BPM | DIASTOLIC BLOOD PRESSURE: 63 MMHG | SYSTOLIC BLOOD PRESSURE: 110 MMHG | RESPIRATION RATE: 15 BRPM | BODY MASS INDEX: 31.06 KG/M2

## 2023-06-02 DIAGNOSIS — L29.9 PRURITUS: ICD-10-CM

## 2023-06-02 DIAGNOSIS — T78.40XA ALLERGIC REACTION, INITIAL ENCOUNTER: ICD-10-CM

## 2023-06-02 DIAGNOSIS — T88.7XXA MEDICATION SIDE EFFECT: ICD-10-CM

## 2023-06-02 PROCEDURE — A9270 NON-COVERED ITEM OR SERVICE: HCPCS | Mod: UD | Performed by: STUDENT IN AN ORGANIZED HEALTH CARE EDUCATION/TRAINING PROGRAM

## 2023-06-02 PROCEDURE — 99283 EMERGENCY DEPT VISIT LOW MDM: CPT

## 2023-06-02 PROCEDURE — 700102 HCHG RX REV CODE 250 W/ 637 OVERRIDE(OP): Mod: UD | Performed by: STUDENT IN AN ORGANIZED HEALTH CARE EDUCATION/TRAINING PROGRAM

## 2023-06-02 RX ORDER — CETIRIZINE HYDROCHLORIDE 10 MG/1
10 TABLET ORAL ONCE
Status: COMPLETED | OUTPATIENT
Start: 2023-06-02 | End: 2023-06-02

## 2023-06-02 RX ORDER — FAMOTIDINE 20 MG/1
20 TABLET, FILM COATED ORAL ONCE
Status: COMPLETED | OUTPATIENT
Start: 2023-06-02 | End: 2023-06-02

## 2023-06-02 RX ADMIN — CETIRIZINE HYDROCHLORIDE 10 MG: 10 TABLET, FILM COATED ORAL at 23:24

## 2023-06-02 RX ADMIN — FAMOTIDINE 20 MG: 20 TABLET, FILM COATED ORAL at 23:24

## 2023-06-02 ASSESSMENT — FIBROSIS 4 INDEX: FIB4 SCORE: 0.62

## 2023-06-03 ENCOUNTER — HOSPITAL ENCOUNTER (EMERGENCY)
Facility: MEDICAL CENTER | Age: 43
End: 2023-06-04
Attending: EMERGENCY MEDICINE
Payer: MEDICAID

## 2023-06-03 DIAGNOSIS — R56.9 SEIZURE (HCC): ICD-10-CM

## 2023-06-03 DIAGNOSIS — R73.9 HYPERGLYCEMIA: ICD-10-CM

## 2023-06-03 DIAGNOSIS — R56.9 SEIZURE SECONDARY TO SUBTHERAPEUTIC ANTICONVULSANT MEDICATION (HCC): ICD-10-CM

## 2023-06-03 DIAGNOSIS — Z79.899 SEIZURE SECONDARY TO SUBTHERAPEUTIC ANTICONVULSANT MEDICATION (HCC): ICD-10-CM

## 2023-06-03 LAB
BASOPHILS # BLD AUTO: 1.3 % (ref 0–1.8)
BASOPHILS # BLD: 0.11 K/UL (ref 0–0.12)
EOSINOPHIL # BLD AUTO: 0.13 K/UL (ref 0–0.51)
EOSINOPHIL NFR BLD: 1.5 % (ref 0–6.9)
ERYTHROCYTE [DISTWIDTH] IN BLOOD BY AUTOMATED COUNT: 43 FL (ref 35.9–50)
HCT VFR BLD AUTO: 37.8 % (ref 42–52)
HGB BLD-MCNC: 13.4 G/DL (ref 14–18)
IMM GRANULOCYTES # BLD AUTO: 0.04 K/UL (ref 0–0.11)
IMM GRANULOCYTES NFR BLD AUTO: 0.5 % (ref 0–0.9)
LYMPHOCYTES # BLD AUTO: 2.86 K/UL (ref 1–4.8)
LYMPHOCYTES NFR BLD: 33.8 % (ref 22–41)
MCH RBC QN AUTO: 31.8 PG (ref 27–33)
MCHC RBC AUTO-ENTMCNC: 35.4 G/DL (ref 32.3–36.5)
MCV RBC AUTO: 89.8 FL (ref 81.4–97.8)
MONOCYTES # BLD AUTO: 0.68 K/UL (ref 0–0.85)
MONOCYTES NFR BLD AUTO: 8 % (ref 0–13.4)
NEUTROPHILS # BLD AUTO: 4.64 K/UL (ref 1.82–7.42)
NEUTROPHILS NFR BLD: 54.9 % (ref 44–72)
NRBC # BLD AUTO: 0 K/UL
NRBC BLD-RTO: 0 /100 WBC (ref 0–0.2)
PLATELET # BLD AUTO: 302 K/UL (ref 164–446)
PMV BLD AUTO: 10.8 FL (ref 9–12.9)
RBC # BLD AUTO: 4.21 M/UL (ref 4.7–6.1)
WBC # BLD AUTO: 8.5 K/UL (ref 4.8–10.8)

## 2023-06-03 PROCEDURE — 700105 HCHG RX REV CODE 258: Mod: UD | Performed by: EMERGENCY MEDICINE

## 2023-06-03 PROCEDURE — 80164 ASSAY DIPROPYLACETIC ACD TOT: CPT

## 2023-06-03 PROCEDURE — 36415 COLL VENOUS BLD VENIPUNCTURE: CPT

## 2023-06-03 PROCEDURE — 83735 ASSAY OF MAGNESIUM: CPT

## 2023-06-03 PROCEDURE — 99284 EMERGENCY DEPT VISIT MOD MDM: CPT

## 2023-06-03 PROCEDURE — 85025 COMPLETE CBC W/AUTO DIFF WBC: CPT

## 2023-06-03 PROCEDURE — 80053 COMPREHEN METABOLIC PANEL: CPT

## 2023-06-03 RX ORDER — DIVALPROEX SODIUM 500 MG/1
500 TABLET, EXTENDED RELEASE ORAL ONCE
Status: COMPLETED | OUTPATIENT
Start: 2023-06-04 | End: 2023-06-04

## 2023-06-03 RX ORDER — SODIUM CHLORIDE, SODIUM LACTATE, POTASSIUM CHLORIDE, CALCIUM CHLORIDE 600; 310; 30; 20 MG/100ML; MG/100ML; MG/100ML; MG/100ML
1000 INJECTION, SOLUTION INTRAVENOUS ONCE
Status: COMPLETED | OUTPATIENT
Start: 2023-06-03 | End: 2023-06-04

## 2023-06-03 RX ADMIN — SODIUM CHLORIDE, POTASSIUM CHLORIDE, SODIUM LACTATE AND CALCIUM CHLORIDE 1000 ML: 600; 310; 30; 20 INJECTION, SOLUTION INTRAVENOUS at 23:23

## 2023-06-03 ASSESSMENT — FIBROSIS 4 INDEX: FIB4 SCORE: 0.62

## 2023-06-03 NOTE — ED PROVIDER NOTES
ED Provider Note    CHIEF COMPLAINT  Chief Complaint   Patient presents with    Allergic Reaction     Patient states he feels he is having an allergic reaction to his hydroxyzine. Pt states he has been on this medication for 2 years but tonight started feeling itchy, no urticaria noted. Denies SOB, chest pain or any other symptoms.        EXTERNAL RECORDS REVIEWED  Inpatient Notes the visit from 5/27/2023 had a evaluation for left low back pain, he did have a CT renal colic study on 5/26/2023 which was negative.    HPI/ROS  LIMITATION TO HISTORY   Select: : None  OUTSIDE HISTORIAN(S):  None available    Norm Prabhakar is a 42 y.o. male who presents states he is feeling tired and itchy after taking hydroxyzine.  Patient states that he takes hydroxyzine for his anxiety he had took one of his pills this evening and then began to feel itchy he also stated he was very tired after taking the hydroxyzine.  He denied any rashes difficulty swallowing difficulty breathing chest pain shortness of breath abdominal pain.    PAST MEDICAL HISTORY   has a past medical history of Anxiety, ASTHMA, Bipolar 1 disorder ([facility]), Depression, Diabetes ([facility]), Fall, Glaucoma, Glaucoma (1982), Hypothyroidism, Indigestion, Mental disorder, Murmur, Pneumonia, Psychiatric problem (2002), S/P thyroidectomy, Seizure ([facility]) (2010), Seizure disorder ([facility]), and Unspecified disorder of thyroid.    SURGICAL HISTORY   has a past surgical history that includes eye surgery; thyroid lobectomy; and other.    FAMILY HISTORY  Family History   Problem Relation Age of Onset    Hypertension Mother     Heart Disease Mother     Lung Disease Mother     Stroke Maternal Grandmother        SOCIAL HISTORY  Social History     Tobacco Use    Smoking status: Former     Packs/day: 0.25     Types: Cigarettes, Cigars     Quit date: 4/1/2020     Years since quitting: 3.1    Smokeless tobacco: Never    Tobacco comments:     3 cigarettes a day   Vaping Use    Vaping Use: Every  "day    Substances: Nicotine, Flavoring   Substance and Sexual Activity    Alcohol use: Not Currently    Drug use: Yes     Types: Inhaled     Comment: marijuana    Sexual activity: Not on file       CURRENT MEDICATIONS  Home Medications       Reviewed by Mandy Chandler R.N. (Registered Nurse) on 06/02/23 at 2119  Med List Status: Partial     Medication Last Dose Status   atorvastatin (LIPITOR) 20 MG Tab  Active   budesonide-formoterol (SYMBICORT) 160-4.5 MCG/ACT Aerosol  Active   Cholecalciferol (VITAMIN D3) 2000 UNIT Cap  Active   cyclobenzaprine (FLEXERIL) 10 mg Tab  Active   divalproex (DEPAKOTE) 500 MG Tablet Delayed Response  Active   divalproex (DEPAKOTE) 500 MG Tablet Delayed Response  Active   dorzolamide-timolol (COSOPT) 22.3-6.8 MG/ML Solution  Active   Fenofibrate 134 MG Cap  Active   insulin glargine 100 UNIT/ML Solution Pen-injector injection  Active   insulin lispro 100 UNIT/ML SC SOPN injection PEN  Active   levETIRAcetam (KEPPRA) 1000 MG tablet  Active   levothyroxine (SYNTHROID) 200 MCG Tab  Active   levothyroxine (SYNTHROID) 25 MCG Tab  Active   lurasidone (LATUDA) 20 MG Tab  Active   montelukast (SINGULAIR) 10 MG Tab  Active   Omega-3 Fatty Acids (FISH OIL) 1000 MG Cap capsule  Active   prazosin (MINIPRESS) 5 MG Cap  Active   sertraline (ZOLOFT) 100 MG Tab  Active   topiramate (TOPAMAX) 50 MG tablet  Active   traZODone (DESYREL) 100 MG Tab  Active                    ALLERGIES  Allergies   Allergen Reactions    Abilify      \"Feeling tired, like I don't even know whats going on around me\"    Fish      Pt reports salmon causes him to be sick to his stomach  Not listed on MAR noted 2/3/2021      Geodon [Ziprasidone Hcl] Anaphylaxis     Anaphylaxis per patient    Aripiprazole      \"I became lethargic.\"    Ziprasidone        PHYSICAL EXAM  VITAL SIGNS: /63   Pulse (!) 53   Temp 35.9 °C (96.7 °F) (Temporal)   Resp 15   Ht 1.88 m (6' 2\")   Wt 110 kg (242 lb)   SpO2 96%   BMI 31.07 kg/m²  "     Pulse ox interpretation: I interpret this pulse ox as normal.  VITALS - vital signs documented prior to this note have been reviewed and noted,  GENERAL - awake, alert, oriented, GCS 15, no apparent distress, non-toxic  appearing  HEENT - normocephalic, atraumatic, pupils equal, sclera anicteric, mucus  membranes moist  NECK - supple, no meningismus, full active range of motion, trachea midline  CARDIOVASCULAR - regular rate/rhythm, no murmurs/gallops/rubs  PULMONARY - no respiratory distress, speaking in full sentences, clear to  auscultation bilaterally, no wheezing/ronchi/rales, no accessory muscle use  GASTROINTESTINAL - soft, non-tender, non-distended, no rebound, guarding,  or peritonitis  GENITOURINARY - Deferred  NEUROLOGIC - Awake alert, normal mental status, speech fluid, cognition  normal, moves all extremities  MUSCULOSKELETAL - no obvious asymmetry or deformities present  EXTREMITIES - warm, well-perfused, no cyanosis or significant edema  DERMATOLOGIC - warm, dry, no rashes, no jaundice  PSYCHIATRIC - normal affect, normal insight, normal concentration    DIAGNOSTIC STUDIES / PROCEDURES      COURSE & MEDICAL DECISION MAKING    ED Observation Status? No; Patient does not meet criteria for ED Observation.     INITIAL ASSESSMENT, COURSE AND PLAN  Care Narrative:.  Presenting for evaluation of itching and tiredness after taking hydroxyzine.  On examination the patient is resting comfortably is in no acute distress.  On examination no signs to suggest an allergic reaction or anaphylaxis believe patient's symptoms may be a side effect of his medication.  For his itching, he was given a dose of cetirizine to avoid oversedation given that he states he is already tired from the hydroxyzine.  Otherwise at this point patient had no other complaints had a reassuring physical exam thus do not see an indication for radiologic or laboratory studies.  Recommend if he continues develop these a symptoms when taking  hydroxyzine he should discontinue the use he should follow-up with his PCP in regards to management of his outpatient antianxiety medications return precautions were discussed he was discharged in stable condition      DISPOSITION AND DISCUSSIONS      Escalation of care considered, and ultimately not performed:blood analysis    Barriers to care at this time, including but not limited to: Patient does not have established PCP.     Decision tools and prescription drugs considered including, but not limited to: Medication modification   .    FINAL DIAGNOSIS  1. Allergic reaction, initial encounter    2. Pruritus    3. Medication side effect           Electronically signed by: Min Rodriguez D.O., 6/2/2023 10:53 PM

## 2023-06-03 NOTE — ED TRIAGE NOTES
"Norm Prabhakar  42 y.o.    Chief Complaint   Patient presents with    Allergic Reaction     Patient states he feels he is having an allergic reaction to his hydroxyzine. Pt states he has been on this medication for 2 years but tonight started feeling itchy, no urticaria noted. Denies SOB, chest pain or any other symptoms.        /82   Pulse 63   Temp 36.2 °C (97.2 °F) (Tympanic)   Resp 16   Ht 1.88 m (6' 2\")   Wt 110 kg (242 lb)   SpO2 98%   BMI 31.07 kg/m²     Pt placed in lobby, educated to alert staff with any changes or concerns.   "

## 2023-06-04 VITALS
TEMPERATURE: 98.4 F | DIASTOLIC BLOOD PRESSURE: 55 MMHG | BODY MASS INDEX: 28 KG/M2 | HEIGHT: 78 IN | OXYGEN SATURATION: 94 % | HEART RATE: 62 BPM | SYSTOLIC BLOOD PRESSURE: 104 MMHG | RESPIRATION RATE: 15 BRPM | WEIGHT: 242 LBS

## 2023-06-04 LAB
ALBUMIN SERPL BCP-MCNC: 4.1 G/DL (ref 3.2–4.9)
ALBUMIN/GLOB SERPL: 2 G/DL
ALP SERPL-CCNC: 63 U/L (ref 30–99)
ALT SERPL-CCNC: 16 U/L (ref 2–50)
ANION GAP SERPL CALC-SCNC: 14 MMOL/L (ref 7–16)
AST SERPL-CCNC: 20 U/L (ref 12–45)
BILIRUB SERPL-MCNC: 0.2 MG/DL (ref 0.1–1.5)
BUN SERPL-MCNC: 10 MG/DL (ref 8–22)
CALCIUM ALBUM COR SERPL-MCNC: 9.4 MG/DL (ref 8.5–10.5)
CALCIUM SERPL-MCNC: 9.5 MG/DL (ref 8.5–10.5)
CHLORIDE SERPL-SCNC: 95 MMOL/L (ref 96–112)
CO2 SERPL-SCNC: 22 MMOL/L (ref 20–33)
CREAT SERPL-MCNC: 0.8 MG/DL (ref 0.5–1.4)
GFR SERPLBLD CREATININE-BSD FMLA CKD-EPI: 114 ML/MIN/1.73 M 2
GLOBULIN SER CALC-MCNC: 2.1 G/DL (ref 1.9–3.5)
GLUCOSE SERPL-MCNC: 347 MG/DL (ref 65–99)
MAGNESIUM SERPL-MCNC: 1.5 MG/DL (ref 1.5–2.5)
POTASSIUM SERPL-SCNC: 3.9 MMOL/L (ref 3.6–5.5)
PROT SERPL-MCNC: 6.2 G/DL (ref 6–8.2)
SODIUM SERPL-SCNC: 131 MMOL/L (ref 135–145)
VALPROATE SERPL-MCNC: <2.8 UG/ML (ref 50–100)

## 2023-06-04 PROCEDURE — 700102 HCHG RX REV CODE 250 W/ 637 OVERRIDE(OP): Mod: UD | Performed by: EMERGENCY MEDICINE

## 2023-06-04 PROCEDURE — A9270 NON-COVERED ITEM OR SERVICE: HCPCS | Mod: UD | Performed by: EMERGENCY MEDICINE

## 2023-06-04 RX ADMIN — DIVALPROEX SODIUM 500 MG: 500 TABLET, EXTENDED RELEASE ORAL at 00:14

## 2023-06-04 NOTE — ED TRIAGE NOTES
"Chief Complaint   Patient presents with    Seizure     BIB [facility]; states he had a seizure at the bus stop; went to [employer] to do his groceries and states he feels like he's going to have another seizure again. AOX4 GCS15 upon arrival.      /61   Pulse 69   Temp 36.7 °C (98 °F) (Temporal)   Resp 18   Ht 1.981 m (6' 6\")   Wt 110 kg (242 lb)   SpO2 95%   BMI 27.97 kg/m²     "

## 2023-06-04 NOTE — ED PROVIDER NOTES
"                                                        ED Provider Note    CHIEF COMPLAINT  Chief Complaint   Patient presents with    Seizure     BIB [facility]; states he had a seizure at the bus stop; went to [facility] to do his groceries and states he feels like he's going to have another seizure again. AOX4 GCS15 upon arrival.         HPI    Primary care provider: Pcp Pt States None   History obtained from: Patient  History limited by: None     Norm Prabhakar is a 42 y.o. male who presents to the ED by EMS for seizures.  Patient states that he was waiting at the bus stop when he apparently had a seizure episode and felt like he was going to have another episode when he was walking into [facility] to do his shopping.  He describes feeling shaky and disoriented.  He denies injury but reports that he \"collapsed\" a few times.  No incontinence.  He believes he may have bit his tongue slightly.  Patient with history of seizures and states that he is on Depakote but may have forgotten to take it \"a couple of times.\"  He states that he may not have taken Depakote today because \"I was sleeping all day.\"  No known fever.  He denies cough or shortness of breath but reports that he does have asthma.  He states that he had nausea this morning without vomiting.  No diarrhea or dysuria.  He denies focal weakness or sensory change at this time.  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  He denies any pain at this time.  Patient with history of diabetes and EMS reports hyperglycemia under glucose check.    REVIEW OF SYSTEMS  Please see HPI for pertinent positives/negatives.  All other systems reviewed and are negative.     PAST MEDICAL HISTORY  Past Medical History:   Diagnosis Date    Seizure ([facility]) 2010    Psychiatric problem 2002    PTSD    Glaucoma 1982    both eyes/ blind on left eye    Anxiety     BIPOLAR    ASTHMA     Bipolar 1 disorder ([facility])     Depression     Diabetes ([facility])     Type II Diabetes    Fall     " passed out 2 wks ago    Glaucoma     Hypothyroidism     Indigestion     once in a while    Mental disorder     learning disabilities; speech impairment; developmental delays    Murmur     since birth    Pneumonia     remote    S/P thyroidectomy     Seizure disorder (HCC)     Unspecified disorder of thyroid         SURGICAL HISTORY  Past Surgical History:   Procedure Laterality Date    EYE SURGERY      OTHER      Hernia Repair when he was 10 yrs old    THYROID LOBECTOMY          SOCIAL HISTORY  Social History     Tobacco Use    Smoking status: Former     Packs/day: 0.25     Types: Cigarettes, Cigars     Quit date: 4/1/2020     Years since quitting: 3.1    Smokeless tobacco: Never    Tobacco comments:     3 cigarettes a day   Vaping Use    Vaping Use: Every day    Substances: Nicotine, Flavoring   Substance and Sexual Activity    Alcohol use: Not Currently    Drug use: Yes     Types: Inhaled     Comment: marijuana    Sexual activity: Not on file        FAMILY HISTORY  Family History   Problem Relation Age of Onset    Hypertension Mother     Heart Disease Mother     Lung Disease Mother     Stroke Maternal Grandmother         CURRENT MEDICATIONS  Home Medications       Reviewed by Leigha Delgado R.N. (Registered Nurse) on 06/03/23 at 2305  Med List Status: <None>     Medication Last Dose Status   atorvastatin (LIPITOR) 20 MG Tab  Active   budesonide-formoterol (SYMBICORT) 160-4.5 MCG/ACT Aerosol  Active   Cholecalciferol (VITAMIN D3) 2000 UNIT Cap  Active   cyclobenzaprine (FLEXERIL) 10 mg Tab  Active   divalproex (DEPAKOTE) 500 MG Tablet Delayed Response  Active   divalproex (DEPAKOTE) 500 MG Tablet Delayed Response  Active   dorzolamide-timolol (COSOPT) 22.3-6.8 MG/ML Solution  Active   Fenofibrate 134 MG Cap  Active   insulin glargine 100 UNIT/ML Solution Pen-injector injection  Active   insulin lispro 100 UNIT/ML SC SOPN injection PEN  Active   levETIRAcetam (KEPPRA) 1000 MG tablet  Active   levothyroxine  "(SYNTHROID) 200 MCG Tab  Active   levothyroxine (SYNTHROID) 25 MCG Tab  Active   lurasidone (LATUDA) 20 MG Tab  Active   montelukast (SINGULAIR) 10 MG Tab  Active   Omega-3 Fatty Acids (FISH OIL) 1000 MG Cap capsule  Active   prazosin (MINIPRESS) 5 MG Cap  Active   sertraline (ZOLOFT) 100 MG Tab  Active   topiramate (TOPAMAX) 50 MG tablet  Active   traZODone (DESYREL) 100 MG Tab  Active                     ALLERGIES  Allergies   Allergen Reactions    Abilify      \"Feeling tired, like I don't even know whats going on around me\"    Fish      Pt reports salmon causes him to be sick to his stomach  Not listed on MAR noted 2/3/2021      Geodon [Ziprasidone Hcl] Anaphylaxis     Anaphylaxis per patient    Aripiprazole      \"I became lethargic.\"    Ziprasidone         PHYSICAL EXAM  VITAL SIGNS: /55   Pulse 62   Temp 36.9 °C (98.4 °F)   Resp 15   Ht 1.981 m (6' 6\")   Wt 110 kg (242 lb)   SpO2 94%   BMI 27.97 kg/m²  @[id number]::@     Pulse ox interpretation: 95% I interpret this pulse ox as normal     Cardiac monitor interpretation: Sinus rhythm with heart rate in the 70s as interpreted by me.  The patient presented with reported seizure episodes and cardiac monitor was ordered to monitor for dysrhythmia.    Constitutional: Well developed, well nourished, alert in no apparent distress, nontoxic appearance    HENT: No external signs of trauma, normocephalic, oropharynx moist and clear, no apparent tongue injury, nose normal     Eyes: Left eye with chronic blindness, conjunctiva without erythema, no discharge, no icterus    Neck: Soft and supple, trachea midline, no stridor, no tenderness, no LAD, no JVD, good ROM    Cardiovascular: Regular rate and rhythm, no murmurs/rubs/gallops, strong distal pulses and good perfusion    Thorax & Lungs: No respiratory distress, CTAB    Abdomen: Soft, nontender, nondistended, no guarding, no rebound, normal BS    Back: No CVAT     Extremities: No cyanosis, no edema, no gross " deformity, good ROM, no tenderness, intact distal pulses with brisk cap refill    Skin: Warm, dry, no pallor/cyanosis, no rash noted      Neuro: A/O times 3, no focal deficits noted    Psychiatric: Cooperative, slightly anxious      DIAGNOSTIC STUDIES / PROCEDURES        LABS  All labs reviewed by me.     Results for orders placed or performed during the hospital encounter of 06/03/23   CBC WITH DIFFERENTIAL   Result Value Ref Range    WBC 8.5 4.8 - 10.8 K/uL    RBC 4.21 (L) 4.70 - 6.10 M/uL    Hemoglobin 13.4 (L) 14.0 - 18.0 g/dL    Hematocrit 37.8 (L) 42.0 - 52.0 %    MCV 89.8 81.4 - 97.8 fL    MCH 31.8 27.0 - 33.0 pg    MCHC 35.4 32.3 - 36.5 g/dL    RDW 43.0 35.9 - 50.0 fL    Platelet Count 302 164 - 446 K/uL    MPV 10.8 9.0 - 12.9 fL    Neutrophils-Polys 54.90 44.00 - 72.00 %    Lymphocytes 33.80 22.00 - 41.00 %    Monocytes 8.00 0.00 - 13.40 %    Eosinophils 1.50 0.00 - 6.90 %    Basophils 1.30 0.00 - 1.80 %    Immature Granulocytes 0.50 0.00 - 0.90 %    Nucleated RBC 0.00 0.00 - 0.20 /100 WBC    Neutrophils (Absolute) 4.64 1.82 - 7.42 K/uL    Lymphs (Absolute) 2.86 1.00 - 4.80 K/uL    Monos (Absolute) 0.68 0.00 - 0.85 K/uL    Eos (Absolute) 0.13 0.00 - 0.51 K/uL    Baso (Absolute) 0.11 0.00 - 0.12 K/uL    Immature Granulocytes (abs) 0.04 0.00 - 0.11 K/uL    NRBC (Absolute) 0.00 K/uL   COMP METABOLIC PANEL   Result Value Ref Range    Sodium 131 (L) 135 - 145 mmol/L    Potassium 3.9 3.6 - 5.5 mmol/L    Chloride 95 (L) 96 - 112 mmol/L    Co2 22 20 - 33 mmol/L    Anion Gap 14.0 7.0 - 16.0    Glucose 347 (H) 65 - 99 mg/dL    Bun 10 8 - 22 mg/dL    Creatinine 0.80 0.50 - 1.40 mg/dL    Calcium 9.5 8.5 - 10.5 mg/dL    AST(SGOT) 20 12 - 45 U/L    ALT(SGPT) 16 2 - 50 U/L    Alkaline Phosphatase 63 30 - 99 U/L    Total Bilirubin 0.2 0.1 - 1.5 mg/dL    Albumin 4.1 3.2 - 4.9 g/dL    Total Protein 6.2 6.0 - 8.2 g/dL    Globulin 2.1 1.9 - 3.5 g/dL    A-G Ratio 2.0 g/dL   MAGNESIUM   Result Value Ref Range    Magnesium 1.5  1.5 - 2.5 mg/dL   VALPROIC ACID   Result Value Ref Range    Valproic Acid <2.8 (L) 50.0 - 100.0 ug/mL   CORRECTED CALCIUM   Result Value Ref Range    Correct Calcium 9.4 8.5 - 10.5 mg/dL   ESTIMATED GFR   Result Value Ref Range    GFR (CKD-EPI) 114 >60 mL/min/1.73 m 2        RADIOLOGY  I have independently interpreted the diagnostic imaging associated with this visit and am waiting the final reading from the radiologist.     No orders to display          COURSE & MEDICAL DECISION MAKING  Nursing notes, VS, PMSFHx reviewed in chart.     Review of past medical records shows the patient was last seen in this ED yesterday for possible allergic reaction to hydroxyzine and was treated with Zyrtec.  He was also seen in this ED May 26, 2023 and May 27, 2023 for back pain.  He was seen in this ED May 22, 2023 for seizure.  CT head on May 22, 2023 had the following findings:    1.  No CT evidence of acute infarct, hemorrhage or mass.  2.  Nonspecific decrease in white matter attenuation bilaterally, unchanged since prior studies.    Patient has had multiple MR brain with his last MRI brain with and without contrast on February 18, 2023 with the following findings:    Extensive white matter FLAIR signal hyperintensity is again noted and unchanged.   Otherwise, the brain parenchyma demonstrates normal morphology and signal. The   ventricles and sulci are appropriate for age.     No diffusion restriction is noted to suggest acute cerebrovascular infarction.   No abnormal intracranial enhancement.     There is no intracranial mass, hemorrhage, midline shift, hydrocephalus, or   extra-axial fluid collection. Flow voids are maintained on T2 weighted images.     Orbits, calvarium, mastoid air cells, and paranasal sinuses are unremarkable.        Differential diagnoses considered include but are not limited to: Seizure, status epilepticus, CVA/intracranial hemorrhage, syncope, dysrhythmia, electrolyte abnormality, medication  noncompliance, hyperglycemia, DKA      ED Observation Status? Yes; I am placing the patient in to an observation status due to a diagnostic uncertainty as well as therapeutic intensity. Patient placed in observation status at 11:10 PM, 6/3/2023.     Observation plan is as follows: We will obtain laboratory studies and monitor patient in the ED.    Upon Reevaluation, the patient's condition has: Remained stable and will be discharged.    Patient discharged from ED Observation status at 0125 on June 4, 2023.      INITIAL ASSESSMENT AND PLAN  Care Narrative: This is a 42-year-old male patient with multiple past medical conditions including bipolar, PTSD, diabetes, seizure disorder who presents to the ED complaining of seizure episodes tonight.  Initial exam is benign without evidence for focal neurological findings.  We will obtain laboratory studies and closely monitor patient in the ED with seizure precautions.      Discussion of management with other Q or appropriate source(s): None     Escalation of care considered, and ultimately not performed: diagnostic imaging and acute inpatient care management, however at this time, the patient is most appropriate for outpatient management.     Decision tools and prescription drugs considered including, but not limited to: Medication modification   .        History and physical exam as above.  Patient was closely monitored in the ED without further seizure episodes.  No worrisome dysrhythmia or hemodynamic instability noted.  Patient appears to be subtherapeutic on his Depakote and was given a dose in the ED.  Laboratory testing shows hyperglycemia without evidence for DKA and he was treated with IV fluid.  He has appropriate pseudohyponatremia.  Mild anemia is stable compared to prior results.  I discussed the findings with the patient.  He is alert, in no acute distress and nontoxic in appearance.  No focal neurological findings to suggest CVA.  No indications for  emergent brain imaging especially given multiple past CTs and MRIs.  No infectious process to suggest meningitis or encephalitis.  He was advised to always take his medications as prescribed and to not miss any doses.  I discussed with him seizure precautions and return to ED precautions.  Patient also advised on glucose monitoring.  He was advised on outpatient follow-up.  Patient verbalized understanding and agreed with plan of care with no further questions or concerns.      HYDRATION: Based on the patient's presentation of Hyperglycemia the patient was given IV fluids. IV Hydration was used because oral hydration was not as rapid as required. Upon recheck following hydration, the patient was stable.      The patient is referred to a primary physician for blood pressure management, diabetic screening, and for all other preventative health concerns.       FINAL IMPRESSION  1. Seizure (HCC) Acute   2. Seizure secondary to subtherapeutic anticonvulsant medication (HCC) Acute   3. Hyperglycemia Acute          DISPOSITION  Patient will be discharged home in stable condition.       FOLLOW UP  Please follow up with your doctor/neurologist    Call in 1 day      [facility], Emergency Dept  [address] [place] [id number] [phone]    If symptoms worsen         OUTPATIENT MEDICATIONS  Discharge Medication List as of 6/4/2023  1:31 AM             Electronically signed by: Juan F Hendricks D.O., 6/3/2023 11:08 PM      Portions of this record were made with voice recognition software.  Despite my review, spelling/grammar/context errors may still remain.  Interpretation of this chart should be taken in this context.

## 2023-06-06 ENCOUNTER — HOSPITAL ENCOUNTER (EMERGENCY)
Facility: MEDICAL CENTER | Age: 43
End: 2023-06-06
Attending: EMERGENCY MEDICINE
Payer: MEDICAID

## 2023-06-06 VITALS
HEART RATE: 60 BPM | TEMPERATURE: 97.5 F | DIASTOLIC BLOOD PRESSURE: 80 MMHG | HEIGHT: 78 IN | RESPIRATION RATE: 16 BRPM | WEIGHT: 248.02 LBS | BODY MASS INDEX: 28.7 KG/M2 | SYSTOLIC BLOOD PRESSURE: 137 MMHG | OXYGEN SATURATION: 96 %

## 2023-06-06 DIAGNOSIS — R73.9 HYPERGLYCEMIA: ICD-10-CM

## 2023-06-06 LAB
ALBUMIN SERPL BCP-MCNC: 4.3 G/DL (ref 3.2–4.9)
ALBUMIN/GLOB SERPL: 2 G/DL
ALP SERPL-CCNC: 91 U/L (ref 30–99)
ALT SERPL-CCNC: 25 U/L (ref 2–50)
ANION GAP SERPL CALC-SCNC: 18 MMOL/L (ref 7–16)
AST SERPL-CCNC: 37 U/L (ref 12–45)
BASOPHILS # BLD AUTO: 1.4 % (ref 0–1.8)
BASOPHILS # BLD: 0.12 K/UL (ref 0–0.12)
BILIRUB SERPL-MCNC: 0.3 MG/DL (ref 0.1–1.5)
BUN SERPL-MCNC: 9 MG/DL (ref 8–22)
CALCIUM ALBUM COR SERPL-MCNC: 8.7 MG/DL (ref 8.5–10.5)
CALCIUM SERPL-MCNC: 8.9 MG/DL (ref 8.5–10.5)
CHLORIDE SERPL-SCNC: 95 MMOL/L (ref 96–112)
CO2 SERPL-SCNC: 21 MMOL/L (ref 20–33)
CREAT SERPL-MCNC: 0.76 MG/DL (ref 0.5–1.4)
EOSINOPHIL # BLD AUTO: 0.12 K/UL (ref 0–0.51)
EOSINOPHIL NFR BLD: 1.4 % (ref 0–6.9)
ERYTHROCYTE [DISTWIDTH] IN BLOOD BY AUTOMATED COUNT: 42 FL (ref 35.9–50)
GFR SERPLBLD CREATININE-BSD FMLA CKD-EPI: 116 ML/MIN/1.73 M 2
GLOBULIN SER CALC-MCNC: 2.2 G/DL (ref 1.9–3.5)
GLUCOSE BLD STRIP.AUTO-MCNC: 313 MG/DL (ref 65–99)
GLUCOSE SERPL-MCNC: 306 MG/DL (ref 65–99)
HCT VFR BLD AUTO: 40 % (ref 42–52)
HGB BLD-MCNC: 14 G/DL (ref 14–18)
IMM GRANULOCYTES # BLD AUTO: 0.09 K/UL (ref 0–0.11)
IMM GRANULOCYTES NFR BLD AUTO: 1.1 % (ref 0–0.9)
LYMPHOCYTES # BLD AUTO: 3.55 K/UL (ref 1–4.8)
LYMPHOCYTES NFR BLD: 42.2 % (ref 22–41)
MCH RBC QN AUTO: 31.3 PG (ref 27–33)
MCHC RBC AUTO-ENTMCNC: 35 G/DL (ref 32.3–36.5)
MCV RBC AUTO: 89.5 FL (ref 81.4–97.8)
MONOCYTES # BLD AUTO: 0.68 K/UL (ref 0–0.85)
MONOCYTES NFR BLD AUTO: 8.1 % (ref 0–13.4)
NEUTROPHILS # BLD AUTO: 3.85 K/UL (ref 1.82–7.42)
NEUTROPHILS NFR BLD: 45.8 % (ref 44–72)
NRBC # BLD AUTO: 0 K/UL
NRBC BLD-RTO: 0 /100 WBC (ref 0–0.2)
PLATELET # BLD AUTO: 345 K/UL (ref 164–446)
PMV BLD AUTO: 11.1 FL (ref 9–12.9)
POTASSIUM SERPL-SCNC: 4.1 MMOL/L (ref 3.6–5.5)
PROT SERPL-MCNC: 6.5 G/DL (ref 6–8.2)
RBC # BLD AUTO: 4.47 M/UL (ref 4.7–6.1)
SODIUM SERPL-SCNC: 134 MMOL/L (ref 135–145)
WBC # BLD AUTO: 8.4 K/UL (ref 4.8–10.8)

## 2023-06-06 PROCEDURE — 700105 HCHG RX REV CODE 258: Mod: UD | Performed by: EMERGENCY MEDICINE

## 2023-06-06 PROCEDURE — 99284 EMERGENCY DEPT VISIT MOD MDM: CPT

## 2023-06-06 PROCEDURE — 80053 COMPREHEN METABOLIC PANEL: CPT

## 2023-06-06 PROCEDURE — 36415 COLL VENOUS BLD VENIPUNCTURE: CPT

## 2023-06-06 PROCEDURE — 85025 COMPLETE CBC W/AUTO DIFF WBC: CPT

## 2023-06-06 PROCEDURE — 82962 GLUCOSE BLOOD TEST: CPT

## 2023-06-06 RX ORDER — SODIUM CHLORIDE 9 MG/ML
1000 INJECTION, SOLUTION INTRAVENOUS ONCE
Status: COMPLETED | OUTPATIENT
Start: 2023-06-06 | End: 2023-06-06

## 2023-06-06 RX ADMIN — SODIUM CHLORIDE 1000 ML: 9 INJECTION, SOLUTION INTRAVENOUS at 15:23

## 2023-06-06 ASSESSMENT — FIBROSIS 4 INDEX: FIB4 SCORE: 0.66

## 2023-06-06 NOTE — ED NOTES
Assist RN: Lab called to state blood sample is highly lipemic and would require further testing to release CMP. ERP notified.

## 2023-06-06 NOTE — ED PROVIDER NOTES
"ED Provider Note    CHIEF COMPLAINT  Chief Complaint   Patient presents with    High Blood Sugar     Patient reports high BS at home of 439 at 1115. Patient reports he took 32 units of insulin at 1030.        EXTERNAL RECORDS REVIEWED  None    HPI/ROS  LIMITATION TO HISTORY   None  OUTSIDE HISTORIAN(S):  None    Norm Prabhakar is a 42 y.o. male who presents here for evaluation of elevated blood sugar.  The patient states that he has \"really high blood sugar\" but has noted it to be in the 300s.  He has no fever chills or vomiting, he has no chest pain or shortness of breath.  He has no back pain or abdominal pain.  Patient denies any fall or trauma.  He states that he usually uses insulin for his blood sugar, but states that sometimes it gets \"high.\"  He did use insulin today as appropriate.    PAST MEDICAL HISTORY   has a past medical history of Anxiety, ASTHMA, Bipolar 1 disorder ([facility]), Depression, Diabetes ([facility]), Fall, Glaucoma, Glaucoma (1982), Hypothyroidism, Indigestion, Mental disorder, Murmur, Pneumonia, Psychiatric problem (2002), S/P thyroidectomy, Seizure ([facility]) (2010), Seizure disorder ([facility]), and Unspecified disorder of thyroid.    SURGICAL HISTORY   has a past surgical history that includes eye surgery; thyroid lobectomy; and other.    FAMILY HISTORY  Family History   Problem Relation Age of Onset    Hypertension Mother     Heart Disease Mother     Lung Disease Mother     Stroke Maternal Grandmother        SOCIAL HISTORY  Social History     Tobacco Use    Smoking status: Former     Packs/day: 0.25     Types: Cigarettes, Cigars     Quit date: 4/1/2020     Years since quitting: 3.1    Smokeless tobacco: Never    Tobacco comments:     3 cigarettes a day   Vaping Use    Vaping Use: Every day    Substances: Nicotine, Flavoring   Substance and Sexual Activity    Alcohol use: Not Currently    Drug use: Yes     Types: Inhaled     Comment: marijuana    Sexual activity: Not on file       CURRENT MEDICATIONS  Home " "Medications       Reviewed by Gabrielle Cuello R.N. (Registered Nurse) on 06/06/23 at 1351  Med List Status: Not Addressed     Medication Last Dose Status   atorvastatin (LIPITOR) 20 MG Tab  Active   budesonide-formoterol (SYMBICORT) 160-4.5 MCG/ACT Aerosol  Active   Cholecalciferol (VITAMIN D3) 2000 UNIT Cap  Active   cyclobenzaprine (FLEXERIL) 10 mg Tab  Active   divalproex (DEPAKOTE) 500 MG Tablet Delayed Response  Active   divalproex (DEPAKOTE) 500 MG Tablet Delayed Response  Active   dorzolamide-timolol (COSOPT) 22.3-6.8 MG/ML Solution  Active   Fenofibrate 134 MG Cap  Active   insulin glargine 100 UNIT/ML Solution Pen-injector injection  Active   insulin lispro 100 UNIT/ML SC SOPN injection PEN  Active   levETIRAcetam (KEPPRA) 1000 MG tablet  Active   levothyroxine (SYNTHROID) 200 MCG Tab  Active   levothyroxine (SYNTHROID) 25 MCG Tab  Active   lurasidone (LATUDA) 20 MG Tab  Active   montelukast (SINGULAIR) 10 MG Tab  Active   Omega-3 Fatty Acids (FISH OIL) 1000 MG Cap capsule  Active   prazosin (MINIPRESS) 5 MG Cap  Active   sertraline (ZOLOFT) 100 MG Tab  Active   topiramate (TOPAMAX) 50 MG tablet  Active   traZODone (DESYREL) 100 MG Tab  Active                    ALLERGIES  Allergies   Allergen Reactions    Abilify      \"Feeling tired, like I don't even know whats going on around me\"    Fish      Pt reports salmon causes him to be sick to his stomach  Not listed on MAR noted 2/3/2021      Geodon [Ziprasidone Hcl] Anaphylaxis     Anaphylaxis per patient    Aripiprazole      \"I became lethargic.\"    Ziprasidone        PHYSICAL EXAM  VITAL SIGNS: /77   Pulse 73   Temp 36.2 °C (97.1 °F) (Temporal)   Resp 18   Ht 1.981 m (6' 6\")   Wt 113 kg (248 lb 0.3 oz)   SpO2 97%   BMI 28.66 kg/m²    Constitutional: Well developed, well nourished. No acute distress.  HEENT: Normocephalic, atraumatic. Posterior pharynx clear and moist.  Eyes:  EOMI. Normal sclera.  Neck: Supple, Full range of motion, " nontender.  Chest/Pulmonary: clear to ausculation. Symmetrical expansion.   Cardio: Regular rate and rhythm with no murmur.   Abdomen: Soft, nontender. No peritoneal signs. No guarding. No palpable masses.  Musculoskeletal: No deformity, no edema, neurovascular intact.   Neuro: Clear speech, appropriate, cooperative, cranial nerves II-XII grossly intact.  Psych: Normal mood and affect      DIAGNOSTIC STUDIES / PROCEDURES  Results for orders placed or performed during the hospital encounter of 06/06/23   CBC with Differential   Result Value Ref Range    WBC 8.4 4.8 - 10.8 K/uL    RBC 4.47 (L) 4.70 - 6.10 M/uL    Hemoglobin 14.0 14.0 - 18.0 g/dL    Hematocrit 40.0 (L) 42.0 - 52.0 %    MCV 89.5 81.4 - 97.8 fL    MCH 31.3 27.0 - 33.0 pg    MCHC 35.0 32.3 - 36.5 g/dL    RDW 42.0 35.9 - 50.0 fL    Platelet Count 345 164 - 446 K/uL    MPV 11.1 9.0 - 12.9 fL    Neutrophils-Polys 45.80 44.00 - 72.00 %    Lymphocytes 42.20 (H) 22.00 - 41.00 %    Monocytes 8.10 0.00 - 13.40 %    Eosinophils 1.40 0.00 - 6.90 %    Basophils 1.40 0.00 - 1.80 %    Immature Granulocytes 1.10 (H) 0.00 - 0.90 %    Nucleated RBC 0.00 0.00 - 0.20 /100 WBC    Neutrophils (Absolute) 3.85 1.82 - 7.42 K/uL    Lymphs (Absolute) 3.55 1.00 - 4.80 K/uL    Monos (Absolute) 0.68 0.00 - 0.85 K/uL    Eos (Absolute) 0.12 0.00 - 0.51 K/uL    Baso (Absolute) 0.12 0.00 - 0.12 K/uL    Immature Granulocytes (abs) 0.09 0.00 - 0.11 K/uL    NRBC (Absolute) 0.00 K/uL   Comp Metabolic Panel   Result Value Ref Range    Sodium 134 (L) 135 - 145 mmol/L    Potassium 4.1 3.6 - 5.5 mmol/L    Chloride 95 (L) 96 - 112 mmol/L    Co2 21 20 - 33 mmol/L    Anion Gap 18.0 (H) 7.0 - 16.0    Glucose 306 (H) 65 - 99 mg/dL    Bun 9 8 - 22 mg/dL    Creatinine 0.76 0.50 - 1.40 mg/dL    Calcium 8.9 8.5 - 10.5 mg/dL    AST(SGOT) 37 12 - 45 U/L    ALT(SGPT) 25 2 - 50 U/L    Alkaline Phosphatase 91 30 - 99 U/L    Total Bilirubin 0.3 0.1 - 1.5 mg/dL    Albumin 4.3 3.2 - 4.9 g/dL    Total Protein  6.5 6.0 - 8.2 g/dL    Globulin 2.2 1.9 - 3.5 g/dL    A-G Ratio 2.0 g/dL   CORRECTED CALCIUM   Result Value Ref Range    Correct Calcium 8.7 8.5 - 10.5 mg/dL   ESTIMATED GFR   Result Value Ref Range    GFR (CKD-EPI) 116 >60 mL/min/1.73 m 2   POCT glucose device results   Result Value Ref Range    POC Glucose, Blood 313 (H) 65 - 99 mg/dL         RADIOLOGY  None        COURSE & MEDICAL DECISION MAKING  Discharge home    INITIAL ASSESSMENT, COURSE AND PLAN  Care Narrative: This is a 42-year-old male here for evaluation of hyperglycemia.  Patient states his glucose was in the 400s, but now it is around 303.  He states that he feels good, he is able to control his sugars at home, and has no insulin.  He has been hydrated with a bag of fluid, and will follow-up as outpatient.    DISPOSITION AND DISCUSSIONS  I have discussed management of the patient with the following physicians and MANNY's: None    Discussion of management with other QHP or appropriate source(s): None    Escalation of care considered, and ultimately not performed: None    Barriers to care at this time, including but not limited to: Patient does not have established PCP.     Decision tools and prescription drugs considered including, but not limited to:  None .    FINAL DIAGNOSIS  1. Hyperglycemia           Electronically signed by: Candido Villar D.O., 6/6/2023 3:09 PM

## 2023-06-06 NOTE — ED TRIAGE NOTES
Chief Complaint   Patient presents with    High Blood Sugar     Patient reports high BS at home of 439 at 1115. Patient reports he took 32 units of insulin at 1030.      BS in triage 313.

## 2023-06-09 ENCOUNTER — APPOINTMENT (OUTPATIENT)
Dept: RADIOLOGY | Facility: MEDICAL CENTER | Age: 43
End: 2023-06-09
Attending: EMERGENCY MEDICINE
Payer: MEDICAID

## 2023-06-09 ENCOUNTER — HOSPITAL ENCOUNTER (OUTPATIENT)
Facility: MEDICAL CENTER | Age: 43
End: 2023-06-11
Attending: EMERGENCY MEDICINE | Admitting: STUDENT IN AN ORGANIZED HEALTH CARE EDUCATION/TRAINING PROGRAM
Payer: MEDICAID

## 2023-06-09 DIAGNOSIS — E89.0 HX OF THYROIDECTOMY: ICD-10-CM

## 2023-06-09 DIAGNOSIS — I10 PRIMARY HYPERTENSION: ICD-10-CM

## 2023-06-09 DIAGNOSIS — J44.9 CHRONIC OBSTRUCTIVE PULMONARY DISEASE, UNSPECIFIED COPD TYPE (HCC): ICD-10-CM

## 2023-06-09 DIAGNOSIS — F32.A ANXIETY AND DEPRESSION: ICD-10-CM

## 2023-06-09 DIAGNOSIS — F99 PSYCHIATRIC PROBLEM: ICD-10-CM

## 2023-06-09 DIAGNOSIS — G81.91 RIGHT HEMIPARESIS (HCC): ICD-10-CM

## 2023-06-09 DIAGNOSIS — I63.00 CEREBROVASCULAR ACCIDENT (CVA) DUE TO THROMBOSIS OF PRECEREBRAL ARTERY (HCC): ICD-10-CM

## 2023-06-09 DIAGNOSIS — F44.9 CONVERSION DISORDER: ICD-10-CM

## 2023-06-09 DIAGNOSIS — E78.5 DYSLIPIDEMIA: ICD-10-CM

## 2023-06-09 DIAGNOSIS — E11.9 TYPE 2 DIABETES MELLITUS WITHOUT COMPLICATION, WITHOUT LONG-TERM CURRENT USE OF INSULIN (HCC): ICD-10-CM

## 2023-06-09 DIAGNOSIS — G40.909 SEIZURE DISORDER (HCC): ICD-10-CM

## 2023-06-09 DIAGNOSIS — H40.9 GLAUCOMA, UNSPECIFIED GLAUCOMA TYPE, UNSPECIFIED LATERALITY: ICD-10-CM

## 2023-06-09 DIAGNOSIS — R53.1 WEAKNESS: ICD-10-CM

## 2023-06-09 DIAGNOSIS — F41.9 ANXIETY AND DEPRESSION: ICD-10-CM

## 2023-06-09 DIAGNOSIS — Z87.898 HISTORY OF SEIZURE: ICD-10-CM

## 2023-06-09 DIAGNOSIS — J45.909 UNCOMPLICATED ASTHMA, UNSPECIFIED ASTHMA SEVERITY, UNSPECIFIED WHETHER PERSISTENT: ICD-10-CM

## 2023-06-09 DIAGNOSIS — M54.50 LUMBAR BACK PAIN: ICD-10-CM

## 2023-06-09 PROBLEM — E11.65 UNCONTROLLED DIABETES MELLITUS WITH HYPERGLYCEMIA (HCC): Status: ACTIVE | Noted: 2023-06-09

## 2023-06-09 LAB
ABO GROUP BLD: NORMAL
ALBUMIN SERPL BCP-MCNC: 4.2 G/DL (ref 3.2–4.9)
ALBUMIN/GLOB SERPL: 2.2 G/DL
ALP SERPL-CCNC: 83 U/L (ref 30–99)
ALT SERPL-CCNC: 22 U/L (ref 2–50)
ANION GAP SERPL CALC-SCNC: 14 MMOL/L (ref 7–16)
APTT PPP: 29.7 SEC (ref 24.7–36)
AST SERPL-CCNC: 31 U/L (ref 12–45)
BASOPHILS # BLD AUTO: 1.3 % (ref 0–1.8)
BASOPHILS # BLD: 0.11 K/UL (ref 0–0.12)
BILIRUB SERPL-MCNC: 0.3 MG/DL (ref 0.1–1.5)
BLD GP AB SCN SERPL QL: NORMAL
BUN SERPL-MCNC: 12 MG/DL (ref 8–22)
CALCIUM ALBUM COR SERPL-MCNC: 8.2 MG/DL (ref 8.5–10.5)
CALCIUM SERPL-MCNC: 8.4 MG/DL (ref 8.5–10.5)
CHLORIDE SERPL-SCNC: 99 MMOL/L (ref 96–112)
CO2 SERPL-SCNC: 21 MMOL/L (ref 20–33)
CREAT SERPL-MCNC: 0.87 MG/DL (ref 0.5–1.4)
EKG IMPRESSION: NORMAL
EOSINOPHIL # BLD AUTO: 0.12 K/UL (ref 0–0.51)
EOSINOPHIL NFR BLD: 1.4 % (ref 0–6.9)
ERYTHROCYTE [DISTWIDTH] IN BLOOD BY AUTOMATED COUNT: 42.2 FL (ref 35.9–50)
GFR SERPLBLD CREATININE-BSD FMLA CKD-EPI: 111 ML/MIN/1.73 M 2
GLOBULIN SER CALC-MCNC: 1.9 G/DL (ref 1.9–3.5)
GLUCOSE BLD STRIP.AUTO-MCNC: 256 MG/DL (ref 65–99)
GLUCOSE SERPL-MCNC: 311 MG/DL (ref 65–99)
HCT VFR BLD AUTO: 35.4 % (ref 42–52)
HGB BLD-MCNC: 12.5 G/DL (ref 14–18)
IMM GRANULOCYTES # BLD AUTO: 0.09 K/UL (ref 0–0.11)
IMM GRANULOCYTES NFR BLD AUTO: 1 % (ref 0–0.9)
INR PPP: 1.03 (ref 0.87–1.13)
LYMPHOCYTES # BLD AUTO: 3.09 K/UL (ref 1–4.8)
LYMPHOCYTES NFR BLD: 35.7 % (ref 22–41)
MCH RBC QN AUTO: 31.6 PG (ref 27–33)
MCHC RBC AUTO-ENTMCNC: 35.3 G/DL (ref 32.3–36.5)
MCV RBC AUTO: 89.4 FL (ref 81.4–97.8)
MONOCYTES # BLD AUTO: 0.57 K/UL (ref 0–0.85)
MONOCYTES NFR BLD AUTO: 6.6 % (ref 0–13.4)
NEUTROPHILS # BLD AUTO: 4.67 K/UL (ref 1.82–7.42)
NEUTROPHILS NFR BLD: 54 % (ref 44–72)
NRBC # BLD AUTO: 0 K/UL
NRBC BLD-RTO: 0 /100 WBC (ref 0–0.2)
PLATELET # BLD AUTO: 317 K/UL (ref 164–446)
PMV BLD AUTO: 11.1 FL (ref 9–12.9)
POTASSIUM SERPL-SCNC: 3.8 MMOL/L (ref 3.6–5.5)
PROT SERPL-MCNC: 6.1 G/DL (ref 6–8.2)
PROTHROMBIN TIME: 13.4 SEC (ref 12–14.6)
RBC # BLD AUTO: 3.96 M/UL (ref 4.7–6.1)
RH BLD: NORMAL
SODIUM SERPL-SCNC: 134 MMOL/L (ref 135–145)
TROPONIN T SERPL-MCNC: 19 NG/L (ref 6–19)
VALPROATE SERPL-MCNC: <2.8 UG/ML (ref 50–100)
WBC # BLD AUTO: 8.7 K/UL (ref 4.8–10.8)

## 2023-06-09 PROCEDURE — 70498 CT ANGIOGRAPHY NECK: CPT

## 2023-06-09 PROCEDURE — 700102 HCHG RX REV CODE 250 W/ 637 OVERRIDE(OP): Mod: UD | Performed by: STUDENT IN AN ORGANIZED HEALTH CARE EDUCATION/TRAINING PROGRAM

## 2023-06-09 PROCEDURE — 80053 COMPREHEN METABOLIC PANEL: CPT

## 2023-06-09 PROCEDURE — 85025 COMPLETE CBC W/AUTO DIFF WBC: CPT

## 2023-06-09 PROCEDURE — 70450 CT HEAD/BRAIN W/O DYE: CPT

## 2023-06-09 PROCEDURE — A9270 NON-COVERED ITEM OR SERVICE: HCPCS | Mod: UD | Performed by: STUDENT IN AN ORGANIZED HEALTH CARE EDUCATION/TRAINING PROGRAM

## 2023-06-09 PROCEDURE — 85610 PROTHROMBIN TIME: CPT

## 2023-06-09 PROCEDURE — 99244 OFF/OP CNSLTJ NEW/EST MOD 40: CPT | Performed by: PSYCHIATRY & NEUROLOGY

## 2023-06-09 PROCEDURE — 700105 HCHG RX REV CODE 258: Mod: UD | Performed by: STUDENT IN AN ORGANIZED HEALTH CARE EDUCATION/TRAINING PROGRAM

## 2023-06-09 PROCEDURE — 0042T CT-CEREBRAL PERFUSION ANALYSIS: CPT

## 2023-06-09 PROCEDURE — 93005 ELECTROCARDIOGRAM TRACING: CPT | Performed by: EMERGENCY MEDICINE

## 2023-06-09 PROCEDURE — 70496 CT ANGIOGRAPHY HEAD: CPT

## 2023-06-09 PROCEDURE — 86900 BLOOD TYPING SEROLOGIC ABO: CPT

## 2023-06-09 PROCEDURE — 80164 ASSAY DIPROPYLACETIC ACD TOT: CPT

## 2023-06-09 PROCEDURE — 94760 N-INVAS EAR/PLS OXIMETRY 1: CPT

## 2023-06-09 PROCEDURE — 700117 HCHG RX CONTRAST REV CODE 255: Performed by: EMERGENCY MEDICINE

## 2023-06-09 PROCEDURE — G0378 HOSPITAL OBSERVATION PER HR: HCPCS

## 2023-06-09 PROCEDURE — 86850 RBC ANTIBODY SCREEN: CPT

## 2023-06-09 PROCEDURE — 82962 GLUCOSE BLOOD TEST: CPT

## 2023-06-09 PROCEDURE — 36415 COLL VENOUS BLD VENIPUNCTURE: CPT

## 2023-06-09 PROCEDURE — 85730 THROMBOPLASTIN TIME PARTIAL: CPT

## 2023-06-09 PROCEDURE — 99285 EMERGENCY DEPT VISIT HI MDM: CPT

## 2023-06-09 PROCEDURE — 96372 THER/PROPH/DIAG INJ SC/IM: CPT

## 2023-06-09 PROCEDURE — 700111 HCHG RX REV CODE 636 W/ 250 OVERRIDE (IP): Mod: UD | Performed by: STUDENT IN AN ORGANIZED HEALTH CARE EDUCATION/TRAINING PROGRAM

## 2023-06-09 PROCEDURE — 99223 1ST HOSP IP/OBS HIGH 75: CPT | Mod: AI | Performed by: STUDENT IN AN ORGANIZED HEALTH CARE EDUCATION/TRAINING PROGRAM

## 2023-06-09 PROCEDURE — 96374 THER/PROPH/DIAG INJ IV PUSH: CPT

## 2023-06-09 PROCEDURE — 86901 BLOOD TYPING SEROLOGIC RH(D): CPT

## 2023-06-09 PROCEDURE — 71045 X-RAY EXAM CHEST 1 VIEW: CPT

## 2023-06-09 PROCEDURE — 84484 ASSAY OF TROPONIN QUANT: CPT

## 2023-06-09 RX ORDER — INSULIN LISPRO 100 [IU]/ML
1-6 INJECTION, SOLUTION INTRAVENOUS; SUBCUTANEOUS
Status: DISCONTINUED | OUTPATIENT
Start: 2023-06-10 | End: 2023-06-11 | Stop reason: HOSPADM

## 2023-06-09 RX ORDER — CYCLOBENZAPRINE HCL 10 MG
10 TABLET ORAL 3 TIMES DAILY PRN
Status: DISCONTINUED | OUTPATIENT
Start: 2023-06-09 | End: 2023-06-11 | Stop reason: HOSPADM

## 2023-06-09 RX ORDER — PRAZOSIN HYDROCHLORIDE 5 MG/1
5 CAPSULE ORAL EVERY EVENING
Status: DISCONTINUED | OUTPATIENT
Start: 2023-06-10 | End: 2023-06-11 | Stop reason: HOSPADM

## 2023-06-09 RX ORDER — LEVOTHYROXINE SODIUM 0.07 MG/1
225 TABLET ORAL
Status: DISCONTINUED | OUTPATIENT
Start: 2023-06-10 | End: 2023-06-11 | Stop reason: HOSPADM

## 2023-06-09 RX ORDER — SERTRALINE HYDROCHLORIDE 100 MG/1
150 TABLET, FILM COATED ORAL
Status: DISCONTINUED | OUTPATIENT
Start: 2023-06-10 | End: 2023-06-11 | Stop reason: HOSPADM

## 2023-06-09 RX ORDER — TRAZODONE HYDROCHLORIDE 50 MG/1
100 TABLET ORAL
Status: DISCONTINUED | OUTPATIENT
Start: 2023-06-10 | End: 2023-06-11 | Stop reason: HOSPADM

## 2023-06-09 RX ORDER — LURASIDONE HYDROCHLORIDE 20 MG/1
20 TABLET, FILM COATED ORAL EVERY EVENING
Status: DISCONTINUED | OUTPATIENT
Start: 2023-06-10 | End: 2023-06-11 | Stop reason: HOSPADM

## 2023-06-09 RX ORDER — INSULIN LISPRO 100 [IU]/ML
0.2 INJECTION, SOLUTION INTRAVENOUS; SUBCUTANEOUS
Status: DISCONTINUED | OUTPATIENT
Start: 2023-06-10 | End: 2023-06-11 | Stop reason: HOSPADM

## 2023-06-09 RX ORDER — HYDROMORPHONE HYDROCHLORIDE 1 MG/ML
1 INJECTION, SOLUTION INTRAMUSCULAR; INTRAVENOUS; SUBCUTANEOUS EVERY 4 HOURS PRN
Status: DISCONTINUED | OUTPATIENT
Start: 2023-06-09 | End: 2023-06-10

## 2023-06-09 RX ORDER — MONTELUKAST SODIUM 10 MG/1
10 TABLET ORAL EVERY EVENING
Status: DISCONTINUED | OUTPATIENT
Start: 2023-06-10 | End: 2023-06-11 | Stop reason: HOSPADM

## 2023-06-09 RX ORDER — DIVALPROEX SODIUM 500 MG/1
1500 TABLET, DELAYED RELEASE ORAL
Status: DISCONTINUED | OUTPATIENT
Start: 2023-06-10 | End: 2023-06-11 | Stop reason: HOSPADM

## 2023-06-09 RX ORDER — LEVETIRACETAM 500 MG/1
1000 TABLET ORAL EVERY 12 HOURS
Status: DISCONTINUED | OUTPATIENT
Start: 2023-06-10 | End: 2023-06-11 | Stop reason: HOSPADM

## 2023-06-09 RX ORDER — FENOFIBRATE 134 MG/1
134 CAPSULE ORAL DAILY
Status: DISCONTINUED | OUTPATIENT
Start: 2023-06-10 | End: 2023-06-11 | Stop reason: HOSPADM

## 2023-06-09 RX ORDER — DIVALPROEX SODIUM 500 MG/1
500 TABLET, DELAYED RELEASE ORAL DAILY
Status: DISCONTINUED | OUTPATIENT
Start: 2023-06-10 | End: 2023-06-11 | Stop reason: HOSPADM

## 2023-06-09 RX ORDER — ATORVASTATIN CALCIUM 20 MG/1
20 TABLET, FILM COATED ORAL EVERY EVENING
Status: DISCONTINUED | OUTPATIENT
Start: 2023-06-09 | End: 2023-06-11 | Stop reason: HOSPADM

## 2023-06-09 RX ORDER — SODIUM CHLORIDE 9 MG/ML
INJECTION, SOLUTION INTRAVENOUS CONTINUOUS
Status: DISCONTINUED | OUTPATIENT
Start: 2023-06-09 | End: 2023-06-11 | Stop reason: HOSPADM

## 2023-06-09 RX ORDER — TOPIRAMATE 25 MG/1
50 TABLET ORAL EVERY 12 HOURS
Status: DISCONTINUED | OUTPATIENT
Start: 2023-06-10 | End: 2023-06-11 | Stop reason: HOSPADM

## 2023-06-09 RX ORDER — LEVOTHYROXINE SODIUM 0.05 MG/1
25 TABLET ORAL
Status: DISCONTINUED | OUTPATIENT
Start: 2023-06-10 | End: 2023-06-09

## 2023-06-09 RX ORDER — ENOXAPARIN SODIUM 100 MG/ML
40 INJECTION SUBCUTANEOUS DAILY
Status: DISCONTINUED | OUTPATIENT
Start: 2023-06-09 | End: 2023-06-11 | Stop reason: HOSPADM

## 2023-06-09 RX ADMIN — IOHEXOL 80 ML: 350 INJECTION, SOLUTION INTRAVENOUS at 19:45

## 2023-06-09 RX ADMIN — HYDROMORPHONE HYDROCHLORIDE 1 MG: 1 INJECTION, SOLUTION INTRAMUSCULAR; INTRAVENOUS; SUBCUTANEOUS at 23:20

## 2023-06-09 RX ADMIN — SODIUM CHLORIDE 1000 ML: 9 INJECTION, SOLUTION INTRAVENOUS at 22:56

## 2023-06-09 RX ADMIN — IOHEXOL 40 ML: 350 INJECTION, SOLUTION INTRAVENOUS at 19:45

## 2023-06-09 RX ADMIN — ENOXAPARIN SODIUM 40 MG: 100 INJECTION SUBCUTANEOUS at 22:36

## 2023-06-09 RX ADMIN — INSULIN GLARGINE-YFGN 23 UNITS: 100 INJECTION, SOLUTION SUBCUTANEOUS at 22:34

## 2023-06-09 ASSESSMENT — PATIENT HEALTH QUESTIONNAIRE - PHQ9
1. LITTLE INTEREST OR PLEASURE IN DOING THINGS: SEVERAL DAYS
3. TROUBLE FALLING OR STAYING ASLEEP OR SLEEPING TOO MUCH: NOT AT ALL
2. FEELING DOWN, DEPRESSED, IRRITABLE, OR HOPELESS: SEVERAL DAYS
7. TROUBLE CONCENTRATING ON THINGS, SUCH AS READING THE NEWSPAPER OR WATCHING TELEVISION: SEVERAL DAYS
4. FEELING TIRED OR HAVING LITTLE ENERGY: NOT AT ALL
8. MOVING OR SPEAKING SO SLOWLY THAT OTHER PEOPLE COULD HAVE NOTICED. OR THE OPPOSITE, BEING SO FIGETY OR RESTLESS THAT YOU HAVE BEEN MOVING AROUND A LOT MORE THAN USUAL: NOT AT ALL
6. FEELING BAD ABOUT YOURSELF - OR THAT YOU ARE A FAILURE OR HAVE LET YOURSELF OR YOUR FAMILY DOWN: SEVERAL DAYS
9. THOUGHTS THAT YOU WOULD BE BETTER OFF DEAD, OR OF HURTING YOURSELF: NOT AT ALL
5. POOR APPETITE OR OVEREATING: NOT AT ALL
SUM OF ALL RESPONSES TO PHQ9 QUESTIONS 1 AND 2: 2
SUM OF ALL RESPONSES TO PHQ QUESTIONS 1-9: 4

## 2023-06-09 ASSESSMENT — FIBROSIS 4 INDEX
FIB4 SCORE: 0.83
FIB4 SCORE: 0.86

## 2023-06-09 ASSESSMENT — PAIN DESCRIPTION - PAIN TYPE
TYPE: CHRONIC PAIN
TYPE: ACUTE PAIN

## 2023-06-09 ASSESSMENT — ENCOUNTER SYMPTOMS
FOCAL WEAKNESS: 1
GASTROINTESTINAL NEGATIVE: 1
RESPIRATORY NEGATIVE: 1
EYES NEGATIVE: 1
WEAKNESS: 1
CARDIOVASCULAR NEGATIVE: 1
MUSCULOSKELETAL NEGATIVE: 1
PSYCHIATRIC NEGATIVE: 1

## 2023-06-09 ASSESSMENT — LIFESTYLE VARIABLES
HAVE YOU EVER FELT YOU SHOULD CUT DOWN ON YOUR DRINKING: NO
ON A TYPICAL DAY WHEN YOU DRINK ALCOHOL HOW MANY DRINKS DO YOU HAVE: 1
EVER FELT BAD OR GUILTY ABOUT YOUR DRINKING: NO
TOTAL SCORE: 0
DOES PATIENT WANT TO STOP DRINKING: NO
AVERAGE NUMBER OF DAYS PER WEEK YOU HAVE A DRINK CONTAINING ALCOHOL: 0
HOW MANY TIMES IN THE PAST YEAR HAVE YOU HAD 5 OR MORE DRINKS IN A DAY: 0
ALCOHOL_USE: NO
CONSUMPTION TOTAL: NEGATIVE
TOTAL SCORE: 0
HAVE PEOPLE ANNOYED YOU BY CRITICIZING YOUR DRINKING: NO
EVER HAD A DRINK FIRST THING IN THE MORNING TO STEADY YOUR NERVES TO GET RID OF A HANGOVER: NO
TOTAL SCORE: 0

## 2023-06-10 PROBLEM — R53.1 WEAKNESS: Status: ACTIVE | Noted: 2023-06-10

## 2023-06-10 LAB
GLUCOSE BLD STRIP.AUTO-MCNC: 194 MG/DL (ref 65–99)
GLUCOSE BLD STRIP.AUTO-MCNC: 198 MG/DL (ref 65–99)
GLUCOSE BLD STRIP.AUTO-MCNC: 238 MG/DL (ref 65–99)

## 2023-06-10 PROCEDURE — A9270 NON-COVERED ITEM OR SERVICE: HCPCS | Mod: UD | Performed by: STUDENT IN AN ORGANIZED HEALTH CARE EDUCATION/TRAINING PROGRAM

## 2023-06-10 PROCEDURE — 82962 GLUCOSE BLOOD TEST: CPT

## 2023-06-10 PROCEDURE — 96372 THER/PROPH/DIAG INJ SC/IM: CPT

## 2023-06-10 PROCEDURE — 700102 HCHG RX REV CODE 250 W/ 637 OVERRIDE(OP): Mod: UD | Performed by: STUDENT IN AN ORGANIZED HEALTH CARE EDUCATION/TRAINING PROGRAM

## 2023-06-10 PROCEDURE — 97166 OT EVAL MOD COMPLEX 45 MIN: CPT

## 2023-06-10 PROCEDURE — G0378 HOSPITAL OBSERVATION PER HR: HCPCS

## 2023-06-10 PROCEDURE — 92610 EVALUATE SWALLOWING FUNCTION: CPT

## 2023-06-10 PROCEDURE — 700111 HCHG RX REV CODE 636 W/ 250 OVERRIDE (IP): Mod: UD | Performed by: STUDENT IN AN ORGANIZED HEALTH CARE EDUCATION/TRAINING PROGRAM

## 2023-06-10 PROCEDURE — 99232 SBSQ HOSP IP/OBS MODERATE 35: CPT | Mod: FS | Performed by: INTERNAL MEDICINE

## 2023-06-10 PROCEDURE — 700105 HCHG RX REV CODE 258: Mod: UD | Performed by: STUDENT IN AN ORGANIZED HEALTH CARE EDUCATION/TRAINING PROGRAM

## 2023-06-10 PROCEDURE — 96376 TX/PRO/DX INJ SAME DRUG ADON: CPT

## 2023-06-10 PROCEDURE — 97163 PT EVAL HIGH COMPLEX 45 MIN: CPT

## 2023-06-10 PROCEDURE — 700102 HCHG RX REV CODE 250 W/ 637 OVERRIDE(OP): Mod: UD | Performed by: NURSE PRACTITIONER

## 2023-06-10 PROCEDURE — A9270 NON-COVERED ITEM OR SERVICE: HCPCS | Mod: UD | Performed by: NURSE PRACTITIONER

## 2023-06-10 RX ORDER — OXYCODONE HYDROCHLORIDE AND ACETAMINOPHEN 5; 325 MG/1; MG/1
1 TABLET ORAL EVERY 6 HOURS PRN
Status: DISCONTINUED | OUTPATIENT
Start: 2023-06-10 | End: 2023-06-11 | Stop reason: HOSPADM

## 2023-06-10 RX ADMIN — INSULIN LISPRO 8 UNITS: 100 INJECTION, SOLUTION INTRAVENOUS; SUBCUTANEOUS at 11:36

## 2023-06-10 RX ADMIN — SODIUM CHLORIDE: 9 INJECTION, SOLUTION INTRAVENOUS at 05:33

## 2023-06-10 RX ADMIN — PRAZOSIN HYDROCHLORIDE 5 MG: 5 CAPSULE ORAL at 17:36

## 2023-06-10 RX ADMIN — MONTELUKAST 10 MG: 10 TABLET, FILM COATED ORAL at 17:36

## 2023-06-10 RX ADMIN — LEVOTHYROXINE SODIUM 225 MCG: 0.07 TABLET ORAL at 10:41

## 2023-06-10 RX ADMIN — TOPIRAMATE 50 MG: 25 TABLET, FILM COATED ORAL at 10:42

## 2023-06-10 RX ADMIN — INSULIN GLARGINE-YFGN 23 UNITS: 100 INJECTION, SOLUTION SUBCUTANEOUS at 17:40

## 2023-06-10 RX ADMIN — FENOFIBRATE 134 MG: 134 CAPSULE ORAL at 10:41

## 2023-06-10 RX ADMIN — TRAZODONE HYDROCHLORIDE 100 MG: 50 TABLET ORAL at 21:32

## 2023-06-10 RX ADMIN — MOMETASONE FUROATE AND FORMOTEROL FUMARATE DIHYDRATE 2 PUFF: 200; 5 AEROSOL RESPIRATORY (INHALATION) at 05:28

## 2023-06-10 RX ADMIN — INSULIN LISPRO 2 UNITS: 100 INJECTION, SOLUTION INTRAVENOUS; SUBCUTANEOUS at 11:36

## 2023-06-10 RX ADMIN — ENOXAPARIN SODIUM 40 MG: 100 INJECTION SUBCUTANEOUS at 17:35

## 2023-06-10 RX ADMIN — LEVETIRACETAM 1000 MG: 500 TABLET, FILM COATED ORAL at 10:40

## 2023-06-10 RX ADMIN — SERTRALINE 150 MG: 100 TABLET, FILM COATED ORAL at 21:31

## 2023-06-10 RX ADMIN — OXYCODONE AND ACETAMINOPHEN 1 TABLET: 5; 325 TABLET ORAL at 17:35

## 2023-06-10 RX ADMIN — DIVALPROEX SODIUM 1500 MG: 500 TABLET, DELAYED RELEASE ORAL at 21:31

## 2023-06-10 RX ADMIN — DIVALPROEX SODIUM 500 MG: 500 TABLET, DELAYED RELEASE ORAL at 10:41

## 2023-06-10 RX ADMIN — LURASIDONE HYDROCHLORIDE 20 MG: 20 TABLET, FILM COATED ORAL at 17:36

## 2023-06-10 RX ADMIN — INSULIN LISPRO 3 UNITS: 100 INJECTION, SOLUTION INTRAVENOUS; SUBCUTANEOUS at 21:32

## 2023-06-10 RX ADMIN — LEVETIRACETAM 1000 MG: 500 TABLET, FILM COATED ORAL at 17:44

## 2023-06-10 RX ADMIN — CYCLOBENZAPRINE 10 MG: 10 TABLET, FILM COATED ORAL at 21:32

## 2023-06-10 RX ADMIN — OXYCODONE AND ACETAMINOPHEN 1 TABLET: 5; 325 TABLET ORAL at 10:41

## 2023-06-10 RX ADMIN — HYDROMORPHONE HYDROCHLORIDE 1 MG: 1 INJECTION, SOLUTION INTRAMUSCULAR; INTRAVENOUS; SUBCUTANEOUS at 05:30

## 2023-06-10 RX ADMIN — TOPIRAMATE 50 MG: 25 TABLET, FILM COATED ORAL at 17:36

## 2023-06-10 RX ADMIN — INSULIN LISPRO 1 UNITS: 100 INJECTION, SOLUTION INTRAVENOUS; SUBCUTANEOUS at 06:04

## 2023-06-10 RX ADMIN — ATORVASTATIN CALCIUM 20 MG: 20 TABLET, FILM COATED ORAL at 17:35

## 2023-06-10 RX ADMIN — INSULIN LISPRO 1 UNITS: 100 INJECTION, SOLUTION INTRAVENOUS; SUBCUTANEOUS at 17:40

## 2023-06-10 RX ADMIN — SODIUM CHLORIDE: 9 INJECTION, SOLUTION INTRAVENOUS at 22:41

## 2023-06-10 RX ADMIN — MOMETASONE FUROATE AND FORMOTEROL FUMARATE DIHYDRATE 2 PUFF: 200; 5 AEROSOL RESPIRATORY (INHALATION) at 17:35

## 2023-06-10 ASSESSMENT — GAIT ASSESSMENTS: GAIT LEVEL OF ASSIST: UNABLE TO PARTICIPATE

## 2023-06-10 ASSESSMENT — COGNITIVE AND FUNCTIONAL STATUS - GENERAL
PERSONAL GROOMING: A LITTLE
TOILETING: A LOT
MOVING TO AND FROM BED TO CHAIR: A LOT
DRESSING REGULAR LOWER BODY CLOTHING: A LOT
EATING MEALS: A LITTLE
DAILY ACTIVITIY SCORE: 14
CLIMB 3 TO 5 STEPS WITH RAILING: TOTAL
WALKING IN HOSPITAL ROOM: TOTAL
TURNING FROM BACK TO SIDE WHILE IN FLAT BAD: A LOT
HELP NEEDED FOR BATHING: A LOT
SUGGESTED CMS G CODE MODIFIER DAILY ACTIVITY: CK
SUGGESTED CMS G CODE MODIFIER MOBILITY: CM
MOVING FROM LYING ON BACK TO SITTING ON SIDE OF FLAT BED: UNABLE
STANDING UP FROM CHAIR USING ARMS: A LOT
DRESSING REGULAR UPPER BODY CLOTHING: A LOT
MOBILITY SCORE: 9

## 2023-06-10 ASSESSMENT — PATIENT HEALTH QUESTIONNAIRE - PHQ9
4. FEELING TIRED OR HAVING LITTLE ENERGY: NOT AT ALL
1. LITTLE INTEREST OR PLEASURE IN DOING THINGS: SEVERAL DAYS
9. THOUGHTS THAT YOU WOULD BE BETTER OFF DEAD, OR OF HURTING YOURSELF: NOT AT ALL
6. FEELING BAD ABOUT YOURSELF - OR THAT YOU ARE A FAILURE OR HAVE LET YOURSELF OR YOUR FAMILY DOWN: SEVERAL DAYS
7. TROUBLE CONCENTRATING ON THINGS, SUCH AS READING THE NEWSPAPER OR WATCHING TELEVISION: SEVERAL DAYS
8. MOVING OR SPEAKING SO SLOWLY THAT OTHER PEOPLE COULD HAVE NOTICED. OR THE OPPOSITE, BEING SO FIGETY OR RESTLESS THAT YOU HAVE BEEN MOVING AROUND A LOT MORE THAN USUAL: NOT AT ALL
2. FEELING DOWN, DEPRESSED, IRRITABLE, OR HOPELESS: SEVERAL DAYS
5. POOR APPETITE OR OVEREATING: NOT AT ALL
3. TROUBLE FALLING OR STAYING ASLEEP OR SLEEPING TOO MUCH: NOT AT ALL
SUM OF ALL RESPONSES TO PHQ QUESTIONS 1-9: 4
SUM OF ALL RESPONSES TO PHQ9 QUESTIONS 1 AND 2: 2

## 2023-06-10 ASSESSMENT — ENCOUNTER SYMPTOMS
DEPRESSION: 1
DIZZINESS: 1
FEVER: 0
GASTROINTESTINAL NEGATIVE: 1
WEAKNESS: 1
BACK PAIN: 1
BLURRED VISION: 1
MYALGIAS: 1
CHILLS: 0
FOCAL WEAKNESS: 1
CARDIOVASCULAR NEGATIVE: 1
RESPIRATORY NEGATIVE: 1

## 2023-06-10 ASSESSMENT — PAIN DESCRIPTION - PAIN TYPE
TYPE: CHRONIC PAIN
TYPE: CHRONIC PAIN

## 2023-06-10 ASSESSMENT — ACTIVITIES OF DAILY LIVING (ADL): TOILETING: INDEPENDENT

## 2023-06-10 NOTE — THERAPY
"Speech Language Pathology   Clinical Swallow Evaluation     Patient Name: Norm Prabhakar  AGE:  43 y.o., SEX:  male  Medical Record #: [id number]  Date of Service: 6/10/2023      History of Present Illness  Patient is a 43 y.o. male who presented 6/9/2023 with right upper and lower extremity weakness that started this afternoon as patient was playing basketball.  He was then brought to the ED for further evaluation     PMHx: Patient has a history of leukoencephalopathy and possible seizure-like disorder.  Well-known to neurology service at [facility].  Patient has had 17 MRI brain since 2014, none reviewed CVA.      General Information:  Vitals  O2 Delivery Device: None - Room Air  Level of Consciousness: Drowsy, Awake  Patient Behaviors: Anxious  Orientation: Self, General place, Situation  Follows Directives: Yes      Prior Living Situation & Level of Function:  Housing / Facility: Homeless, Other (Comments) (Lives at [facility])  Lives with - Patient's Self Care Capacity: Alone and Able to Care For Self     Communication: Patient is a poor historian. Dysfluencies noted with speech tasks but fluctuated during session. Pt inconsistently reported if these speech changes were acute vs chronic.  Swallowing: Denied any difficulty at baseline.     Oral Mechanism Evaluation:  Dentition: Scattered dentition (moderate underbite)   Facial Symmetry: Central right facial droop  Facial Sensation: Impaired - right     Labial Observations: Right sided weakness   Lingual Observations: Left lingual deviation, Right lingual deviation (Inconsistent. Midline during spoon trials.)  Motor Speech: Dysfluencies and dysarthria appreciated. Did appear to improve as session progressed.        Laryngeal Function:  Secretion Management: Adequate  Voice Quality: WFL  Cough: Perceptually WNL       Subjective  \" I smell chocolate cake.\"    Assessment  Current Method of Nutrition: NPO until cleared by speech pathology  Positioning: Sumaya's (60-90 " "degrees)  Bolus Administration: Patient    O2 Delivery Device: None - Room Air  Factor(s) Affecting Performance: None (cues to refrain from talking with PO in mouth)  Tracheostomy : No     Swallowing Trials:  Swallowing Trials  Ice: WFL  Thin Liquid (TN0): WFL  Liquidised (LQ3): WFL  Easy to Chew (EC7): WFL  Regular (RG7): WFL    Comments: Patient with adequate labial seal on spoon and straw sips despite right sided weakness at rest. Pt with appropriate mastication of regular/hard solids despite oral motor examination revealed mildly misaligned jaw, under bite, and labial and lingual weakness and ROM.       Clinical Impressions  Patient was seen on this date for a clinical swallow evaluation. Patient was AAOx4. Speech consisted of stutter-like disfluencies characterized by: significant repetition of initial sound and mild halting although appeared to improve as session progressed. Patient  inconsistently reported worsening \"stuttering\" compared to baseline vs it being consistent with baseline. Previous documentation from 2022 revealed similar speech pattern. Patient with no overt s/s of oropharyngeal dysphagia this session. .    Recommendations  Diet Consistency: Regular/thin liquids  Instrumentation: None indicated at this time  Medication: Whole with liquid  Supervision: Independent, Assist with meal tray set up  Positioning: Fully upright and midline during oral intake  Risk Management : Reduce environmental distractions  Oral Care: BID     SLP Treatment Plan  Treatment Plan: Dysphagia Treatment  SLP Frequency: 2x Per Week to ensure diet tolerance   Estimated Duration: Until Therapy Goals Met    Anticipated Discharge Needs  Discharge Recommendations: Anticipate that the patient will have no further speech therapy needs after discharge from the hospital        Patient / Family Goals  Patient / Family Goal #1: \" I want to eat breakfast.\"  Short Term Goals  Short Term Goal # 1: The patient will consume regular/thin " liquid meal items with no overt s/s of aspiration or difficulty given min cues for standard swallow stratgeies.      Paulette Brice MS. CCC- SLP

## 2023-06-10 NOTE — ED PROVIDER NOTES
ED Provider Note    CHIEF COMPLAINT  Chief Complaint   Patient presents with    Possible Stroke     Pt bib [facility], while playing basket ball this evening  had sudden right sided paralysis, hx of the same       EXTERNAL RECORDS REVIEWED  Inpatient Notes patient has been seen multiple times in the emergency department since 2018 for similar presentations.    HPI/ROS  LIMITATION TO HISTORY   Select: None  OUTSIDE HISTORIAN(S):  EMS reports that the patient was playing basketball this evening when he had sudden right-sided paralysis.  Glucose was within normal limits.    Norm Prabhakar is a 42 y.o. male who presents to the emerge apartment chief complaint of a sudden right-sided paralysis.  Patient has a history of on and off right-sided weakness has been seen several times in the past.  Today he was playing basketball his fasting blood sugar was elevated but not low.  His blood pressure was elevated as well.  At the bedside the patient has a little bit of a stuttering speech and bradykinesia.  He is endorsing a little bit of headache at this time.  He does have a significant psychiatric history on multiple medications.      As he is presenting with right-sided weakness we did determine to taken to CT scan I was met at bedside by neurology Dr. fairchild    PAST MEDICAL HISTORY   has a past medical history of Anxiety, ASTHMA, Bipolar 1 disorder ([facility]), Depression, Diabetes ([facility]), Fall, Glaucoma, Glaucoma (1982), Hypothyroidism, Indigestion, Mental disorder, Murmur, Pneumonia, Psychiatric problem (2002), S/P thyroidectomy, Seizure ([facility]) (2010), Seizure disorder ([facility]), and Unspecified disorder of thyroid.    SURGICAL HISTORY   has a past surgical history that includes eye surgery; thyroid lobectomy; and other.    FAMILY HISTORY  Family History   Problem Relation Age of Onset    Hypertension Mother     Heart Disease Mother     Lung Disease Mother     Stroke Maternal Grandmother        SOCIAL HISTORY  Social History     Tobacco Use  "   Smoking status: Former     Packs/day: 0.25     Types: Cigarettes, Cigars     Quit date: 4/1/2020     Years since quitting: 3.1    Smokeless tobacco: Never    Tobacco comments:     3 cigarettes a day   Vaping Use    Vaping Use: Every day    Substances: Nicotine, Flavoring   Substance and Sexual Activity    Alcohol use: Not Currently    Drug use: Yes     Types: Inhaled     Comment: marijuana    Sexual activity: Not on file       CURRENT MEDICATIONS  Home Medications    **Home medications have not yet been reviewed for this encounter**         ALLERGIES  Allergies   Allergen Reactions    Abilify      \"Feeling tired, like I don't even know whats going on around me\"    Fish      Pt reports salmon causes him to be sick to his stomach  Not listed on MAR noted 2/3/2021      Geodon [Ziprasidone Hcl] Anaphylaxis     Anaphylaxis per patient    Aripiprazole      \"I became lethargic.\"    Ziprasidone        PHYSICAL EXAM  VITAL SIGNS: /85   Pulse 63   Temp 36.4 °C (97.5 °F) (Temporal)   Resp 18   Ht 1.981 m (6' 6\")   Wt 116 kg (255 lb 4.7 oz)   SpO2 94%   BMI 29.50 kg/m²    Pulse Ox Interpretation:   Pulse Ox is normal limits  Constitutional: Alert in no apparent distress.  HENT: Normocephalic atraumatic, MMM  Eyes: PER, Conjunctiva normal, Non-icteric.  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   Cardiovascular: Regular rate and rhythm, no murmurs.   Thorax & Lungs: Normal breath sounds, No respiratory distress No chest tenderness.   Abdomen: Bowel sounds normal, Soft, No tenderness  Skin: Warm, Dry, No erythema, No rash.   Back: No bony tenderness, No CVA tenderness.   Extremities/MSK: Intact equal distal pulses, No edema, No tenderness  Neurologic: Alert and oriented x3, cranial nerves II through XII intact he does have limited gaze on the left eye secondary to blindness, complete paralysis of the right upper and right lower extremity with decreased sensation of " the right lower extremity little bit dysarthria.      DIAGNOSTIC STUDIES / PROCEDURES  EKG  I have independently interpreted this EKG  Results for orders placed or performed during the hospital encounter of 23   EKG (NOW)   Result Value Ref Range    Report       [facility] Emergency Dept.    Test Date:  2023  Pt Name:    HOLLY KIM                 Department: ER  MRN:        [id number]                      Room:        11  Gender:     Male                         Technician: [id number]  :        1980                   Requested By:HERNESTO NAIR  Order #:    [id number]                    Reading MD:    Measurements  Intervals                                Axis  Rate:       68                           P:          51  ME:         170                          QRS:        37  QRSD:       119                          T:          38  QT:         407  QTc:        433    Interpretive Statements  Sinus rhythm  Nonspecific intraventricular conduction delay  Low voltage, extremity leads  Compared to ECG 2023 20:42:42  Low QRS voltage now present  Sinus bradycardia no longer present           LABS  Labs Reviewed   CBC WITH DIFFERENTIAL - Abnormal; Notable for the following components:       Result Value    RBC 3.96 (*)     Hemoglobin 12.5 (*)     Hematocrit 35.4 (*)     Immature Granulocytes 1.00 (*)     All other components within normal limits    Narrative:     Indicate which anticoagulants the patient is on:->UNKNOWN  Biotin intake of greater than 5 mg per day may interfere with  troponin levels, causing false low values.   COMP METABOLIC PANEL - Abnormal; Notable for the following components:    Sodium 134 (*)     Glucose 311 (*)     Calcium 8.4 (*)     All other components within normal limits    Narrative:     Indicate which anticoagulants the patient is on:->UNKNOWN  Biotin intake of greater than 5 mg per day may interfere with  troponin levels, causing false low values.    CORRECTED CALCIUM - Abnormal; Notable for the following components:    Correct Calcium 8.2 (*)     All other components within normal limits    Narrative:     Indicate which anticoagulants the patient is on:->UNKNOWN  Biotin intake of greater than 5 mg per day may interfere with  troponin levels, causing false low values.   VALPROIC ACID - Abnormal; Notable for the following components:    Valproic Acid <2.8 (*)     All other components within normal limits   POCT GLUCOSE DEVICE RESULTS - Abnormal; Notable for the following components:    POC Glucose, Blood 256 (*)     All other components within normal limits   COD (ADULT)   TROPONIN    Narrative:     Indicate which anticoagulants the patient is on:->UNKNOWN  Biotin intake of greater than 5 mg per day may interfere with  troponin levels, causing false low values.   PROTHROMBIN TIME    Narrative:     SPECIMEN IS A RECOLLECT - QNS   APTT    Narrative:     SPECIMEN IS A RECOLLECT - QNS   ESTIMATED GFR    Narrative:     Indicate which anticoagulants the patient is on:->UNKNOWN  Biotin intake of greater than 5 mg per day may interfere with  troponin levels, causing false low values.         RADIOLOGY  I have independently interpreted the diagnostic imaging associated with this visit and am waiting the final reading from the radiologist.   My preliminary interpretation is as follows:     CT head without evidence of bleed or mass    Radiologist interpretation:     DX-CHEST-PORTABLE (1 VIEW)   Final Result      Hypoinflation without other evidence for acute cardiopulmonary disease.      CT-CTA NECK WITH & W/O-POST PROCESSING   Final Result      1.  No high-grade stenosis, large vessel occlusion, aneurysm or dissection.   2.  Unchanged heterogeneously enhancing mass in the anterior neck, possibly ectopic thyroid nodule.      CT-CTA HEAD WITH & W/O-POST PROCESS   Final Result      No large vessel occlusion, high-grade stenosis or aneurysm of the [place].       CT-CEREBRAL PERFUSION ANALYSIS   Final Result      1.  Cerebral blood flow less than 30% likely representing completed infarct = 0 mL.      2.  T Max more than 6 seconds likely representing combination of completed infarct and ischemia = 5 mL.      3.  Mismatched volume likely representing ischemic brain/penumbra = 5 mL.      4.  Please note that the cerebral perfusion was performed on the limited brain tissue around the basal ganglia region. Infarct/ischemia outside the CT perfusion sections can be missed in this study.      CT-HEAD W/O   Final Result      1.  No acute intracranial abnormality.   2.  Diffuse low-attenuation again seen throughout the white matter.  Microvascular ischemic change versus demyelination or gliosis.               COURSE & MEDICAL DECISION MAKING    ED Observation Status? Yes; I am placing the patient in to an observation status due to a diagnostic uncertainty as well as therapeutic intensity. Patient placed in observation status at 7:02 PM, 6/9/2023.     Observation plan is as follows: imaging, code stroke    Upon Reevaluation, the patient's condition has: not improved; and will be escalated to hospitalization.    Patient discharged from ED Observation status at 8:46 PM  (Time) 06/09/23  (Date).     INITIAL ASSESSMENT, COURSE AND PLAN  Care Narrative patient presents emerged department as a code stroke.  He is having right-sided weakness with an NIH score of 12.  Chart review reveals patient continue multiple times and has never had a positive MRI he does have a significant psychiatric history but unlikely a true stroke but was taken to CT nonetheless just to evaluate for any signs of bleed or large blockage.  Neurology was at the bedside and he will review the imaging.    7:21 PM  Spoke w Dr Garcia after chart review and the patient's imaging review.  At this time he does not recommend TNK he also does not recommend MRI or any further imaging but the patient is still having weakness on the  right side after observation here in the emerge department the potential PT evaluation.  DISPOSITION AND DISCUSSIONS  8:46 PM  Unfortunately the patient is still feeling weak.  He is a little sad because his [birth year] is tomorrow.  His headache is improved.  The plan is for hospitalization physical therapy evaluation in the morning and I spoke with Dr. Patel with the medicine service and he is excepted the patient      I have discussed management of the patient with the following physicians and MANNY's: Dr. Patel and     Discussion of management with other QHP or appropriate source(s): None       Decision tools and prescription drugs considered including, but not limited to: NIH Stroke Scale 12 .    FINAL DIAGNOSIS  Right-sided weakness  Underlying psychosis       Electronically signed by: Daniela Stephen M.D., 6/9/2023 7:02 PM

## 2023-06-10 NOTE — PROGRESS NOTES
4 Eyes Skin Assessment Completed by [facility]aston and [facility]    Head WDL  Ears WDL  Nose WDL  Mouth WDL  Neck WDL  Breast/Chest WDL  Shoulder Blades WDL  Spine WDL  (R) Arm/Elbow/Hand WDL  (L) Arm/Elbow/Hand WDL  Abdomen WDL  Groin WDL  Scrotum/Coccyx/Buttocks WDL  (R) Leg Scab  (L) Leg Scab  (R) Heel/Foot/Toe WDL   (L) Heel/Foot/Toe WDL          Devices In Places Blood Pressure Cuff and Pulse Ox      Interventions In Place Pressure Redistribution Mattress    Possible Skin Injury No    Pictures Uploaded Into Epic N/A  Wound Consult Placed N/A  RN Wound Prevention Protocol Ordered No

## 2023-06-10 NOTE — CARE PLAN
The patient is Stable - Low risk of patient condition declining or worsening    Shift Goals  Clinical Goals: Monitor neuro symptoms, pain control, SLP consult  Patient Goals: Rest, pain control  Family Goals: JUANCARLOS        Problem: Pain - Standard  Goal: Alleviation of pain or a reduction in pain to the patient’s comfort goal  Outcome: Progressing     Problem: Knowledge Deficit - Standard  Goal: Patient and family/care givers will demonstrate understanding of plan of care, disease process/condition, diagnostic tests and medications  Outcome: Progressing     Problem: Fall Risk  Goal: Patient will remain free from falls  Outcome: Progressing

## 2023-06-10 NOTE — THERAPY
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 male presents with RUE/RLE weakness and dysarthria. Pt reports onset 6/9 while playing basketball. Pt seems to be a somewhat poor historian but does live at the local Shelter. Per medical note - considering conversion disorder. PT eval completed.    See chart below for full objective assessment. Note, significant limitations in pt's ability to sit to stand and unable to transfer. Also, unable to participate in gait training. While standing pt was able to weight shift, however, unable to march in place. Due to current functional limitations, pt is a great candidate for continued acute PT services and will benefit from post acute placement for additional PT services - recommending PMR consult. Will continue to follow and update recs if needed.     Plan    Physical Therapy Initial Treatment Plan   Treatment Plan : Bed Mobility, Gait Training, Neuro Re-Education / Balance, Self Care / Home Evaluation, Stair Training, Therapeutic Activities, Therapeutic Exercise  Treatment Frequency: 4 Times per Week  Duration: Until Therapy Goals Met    DC Equipment Recommendations: Unable to determine at this time  Discharge Recommendations: Recommend post-acute placement for additional physical therapy services prior to discharge home (PMR consult)       Subjective    Pt agrees to PT eval this AM.      Objective       06/10/23 0931   Initial Contact Note    Initial Contact Note Order Received and Verified, Physical Therapy Evaluation in Progress with Full Report to Follow.   Precautions   Precautions Fall Risk   Comments Blind in L eye   Pain 0 - 10 Group   Therapist Pain Assessment Post Activity Pain Same as Prior to Activity;Nurse Notified   Prior Living Situation   Housing / Facility  Homeless  (lives at [facility])   Equipment Owned None   Lives with - Patient's Self Care Capacity Alone and Able to Care For Self   Comments somewhat poor historian   Prior Level of Functional Mobility   Bed Mobility Independent   Transfer Status Independent   Ambulation Independent   Ambulation Distance community   Cognition    Cognition / Consciousness X   Speech/ Communication Dysarthric   Level of Consciousness Alert   Ability To Follow Commands 2 Step   Attention Impaired   Passive ROM Lower Body   Passive ROM Lower Body WDL   Active ROM Lower Body    Active ROM Lower Body  X   Comments unable to actively extend R knee   Strength Lower Body   Lower Body Strength  X   Rt Knee Extension Strength 1 (T)   Coordination Upper Body   Coordination X   Gross Motor Coordination absent   Balance Assessment   Sitting Balance (Static) Fair   Sitting Balance (Dynamic) Fair -   Standing Balance (Static) Poor   Standing Balance (Dynamic) Poor -   Weight Shift Sitting Good   Weight Shift Standing Poor   Comments w/FWW able to shift L<>R standing with Jero, attempted march in place   Bed Mobility    Supine to Sit Contact Guard Assist   Sit to Supine Minimal Assist   Scooting Contact Guard Assist   Gait Analysis   Gait Level Of Assist Unable to Participate   Functional Mobility   Sit to Stand Moderate Assist   Mobility supine to EOB (with HOB elevated) > sitting at EOB > x1 standing > returned to bed   How much difficulty does the patient currently have...   Turning over in bed (including adjusting bedclothes, sheets and blankets)? 2   Sitting down on and standing up from a chair with arms (e.g., wheelchair, bedside commode, etc.) 1   Moving from lying on back to sitting on the side of the bed? 2   How much help from another person does the patient currently need...   Moving to and from a bed to a chair (including a wheelchair)? 2   Need to walk in a hospital room? 1   Climbing 3-5 steps with a railing? 1   6 clicks  Mobility Score 9   Activity Tolerance   Sitting Edge of Bed 5 min   Standing 1 min   Short Term Goals    Short Term Goal # 1 Pt is able to complete sit to stand with CGA in 6 tx to improve functional mobility.   Short Term Goal # 2 pt is able to ambulate 20 ft with FWW with CGA in 6tx to improve functional mobility.   Short Term Goal # 3 pt is able to complete bed mobility with spv in 6 tx.   Short Term Goal # 4 pt is able to transfer EOB<>chair with Jero and FWW in 6tx to improve functional mobility.   Education Group   Education Provided Role of Physical Therapist   Role of Physical Therapist Patient Response Patient;Acceptance;Explanation;Verbal Demonstration   Physical Therapy Initial Treatment Plan    Treatment Plan  Bed Mobility;Gait Training;Neuro Re-Education / Balance;Self Care / Home Evaluation;Stair Training;Therapeutic Activities;Therapeutic Exercise   Treatment Frequency 4 Times per Week   Duration Until Therapy Goals Met   Problem List    Problems Pain;Impaired Bed Mobility;Impaired Transfers;Impaired Ambulation;Functional ROM Deficit;Functional Strength Deficit;Impaired Balance;Impaired Coordination;Impaired Vision;Decreased Activity Tolerance;Safety Awareness Deficits / Cognition;Motor Planning / Sequencing   Anticipated Discharge Equipment and Recommendations   DC Equipment Recommendations Unable to determine at this time   Discharge Recommendations Recommend post-acute placement for additional physical therapy services prior to discharge home  (PMR consult)   Interdisciplinary Plan of Care Collaboration   IDT Collaboration with  Nursing;Occupational Therapist   Patient Position at End of Therapy In Bed;Bed Alarm On;Call Light within Reach;Tray Table within Reach;Phone within Reach   Collaboration Comments RN updated   Session Information   Date / Session Number  6/10 - 1 (1/4, 6/16)

## 2023-06-10 NOTE — RESPIRATORY CARE
COPD EDUCATION by COPD CLINICAL EDUCATOR  6/10/2023 at 6:47 AM by Viral Paul RRT     Patient reviewed by COPD education team. Patient does not have a history or diagnosis of COPD and is a non-smoker.  Therefore, patient does not qualify for the COPD program.        COPD Assessment  COPD Clinical Specialists ONLY  COPD Education Initiated: No--Quick Screen (Asthma not COPD, on Symbicort, no PFT, quit smoking 2020)  Interdisciplinary Rounds: Attendance at Rounds (30 Min)    PFT Results    No results found for: PFT    Meds to Beds  Would the patient like to opt in for Bedside Medication Delivery at Discharge?: No

## 2023-06-10 NOTE — H&P
Hospital Medicine History & Physical Note    Date of Service  6/9/2023    Primary Care Physician  Pcp Pt States None    Consultants  Neurology    Code Status  Full Code    Chief Complaint  Chief Complaint   Patient presents with    Possible Stroke     Pt ella VIZCARRA, while playing basket ball this evening  had sudden right sided paralysis, hx of the same       History of Presenting Illness  Norm Prabhakar is a 42 y.o. male who presented 6/9/2023 with right upper and lower extremity weakness that started this afternoon as patient was playing basketball.  He was then brought to the ED for further evaluation    Patient has a history of leukoencephalopathy and possible seizure-like disorder.  Well-known to neurology service at [facility].  Patient has had 17 MRI brain since 2014, none reviewed CVA.    In ED, patient found to have normal vital signs.  Pertinent labs include sugar 311.  CT head and neck negative for acute intracranial abnormality.  Chest x-ray showing no acute cardiopulmonary abnormality.  EKG showing normal sinus rhythm with no ischemic changes.    I discussed the plan of care with patient.    Review of Systems  Review of Systems   Constitutional:  Positive for malaise/fatigue.   HENT: Negative.     Eyes: Negative.    Respiratory: Negative.     Cardiovascular: Negative.    Gastrointestinal: Negative.    Genitourinary: Negative.    Musculoskeletal: Negative.    Skin: Negative.    Neurological:  Positive for focal weakness and weakness.   Endo/Heme/Allergies: Negative.    Psychiatric/Behavioral: Negative.         Past Medical History   has a past medical history of Anxiety, ASTHMA, Bipolar 1 disorder ([facility]), Depression, Diabetes ([facility]), Fall, Glaucoma, Glaucoma (1982), Hypothyroidism, Indigestion, Mental disorder, Murmur, Pneumonia, Psychiatric problem (2002), S/P thyroidectomy, Seizure ([facility]) (2010), Seizure disorder ([facility]), and Unspecified disorder of thyroid.    Surgical History   has a past surgical history  "that includes eye surgery; thyroid lobectomy; and other.     Family History  family history includes Heart Disease in his mother; Hypertension in his mother; Lung Disease in his mother; Stroke in his maternal grandmother.   Family history reviewed with patient. There is no family history that is pertinent to the chief complaint.     Social History   reports that he quit smoking about 3 years ago. His smoking use included cigarettes and cigars. He smoked an average of .25 packs per day. He has never used smokeless tobacco. He reports that he does not currently use alcohol. He reports current drug use. Drug: Inhaled.    Allergies  Allergies   Allergen Reactions    Abilify      \"Feeling tired, like I don't even know whats going on around me\"    Fish      Pt reports salmon causes him to be sick to his stomach  Not listed on MAR noted 2/3/2021      Geodon [Ziprasidone Hcl] Anaphylaxis     Anaphylaxis per patient    Aripiprazole      \"I became lethargic.\"    Ziprasidone        Medications  Prior to Admission Medications   Prescriptions Last Dose Informant Patient Reported? Taking?   Cholecalciferol (VITAMIN D3) 2000 UNIT Cap  MAR from Other Facility Yes No   Sig: Take 4,000 Units by mouth every evening.   Fenofibrate 134 MG Cap   No No   Sig: Take 134 mg by mouth every day.   Omega-3 Fatty Acids (FISH OIL) 1000 MG Cap capsule  MAR from Other Facility Yes No   Sig: Take 1,000 mg by mouth 2 Times a Day.   atorvastatin (LIPITOR) 20 MG Tab   No No   Sig: Take 1 Tablet by mouth every evening.   budesonide-formoterol (SYMBICORT) 160-4.5 MCG/ACT Aerosol   No No   Sig: Inhale 2 Puffs 2 times a day.   cyclobenzaprine (FLEXERIL) 10 mg Tab   No No   Sig: Take 1 Tablet by mouth 3 times a day as needed for Moderate Pain or Muscle Spasms.   divalproex (DEPAKOTE) 500 MG Tablet Delayed Response   No No   Sig: Take 3 Tablets by mouth at bedtime. 3 tablets = 1500 mg every evening   divalproex (DEPAKOTE) 500 MG Tablet Delayed Response   No " No   Sig: Take 1 Tablet by mouth every day.   dorzolamide-timolol (COSOPT) 22.3-6.8 MG/ML Solution   No No   Sig: Administer 1 Drop into both eyes 2 times a day.   insulin glargine 100 UNIT/ML Solution Pen-injector injection   No No   Sig: Inject 18 Units under the skin every day.   insulin lispro 100 UNIT/ML SC SOPN injection PEN   No No   Sig: Inject 2-12 Units under the skin 4 Times a Day,Before Meals and at Bedtime.   levETIRAcetam (KEPPRA) 1000 MG tablet   No No   Sig: Take 1 Tablet by mouth every 12 hours.   levothyroxine (SYNTHROID) 200 MCG Tab   No No   Sig: Take 1 Tablet by mouth every morning on an empty stomach. Take in addition to 25 mcg tablet for daily dose of 225 mcg   levothyroxine (SYNTHROID) 25 MCG Tab   No No   Sig: Take 1 Tablet by mouth every morning on an empty stomach. Take in addition to 200 mcg tablet for daily dose of 225 mcg   lurasidone (LATUDA) 20 MG Tab   No No   Sig: Take 1 Tablet by mouth every evening.   montelukast (SINGULAIR) 10 MG Tab   No No   Sig: Take 1 Tablet by mouth every evening.   prazosin (MINIPRESS) 5 MG Cap   No No   Sig: Take 1 Capsule by mouth every evening. 2 caps = 4 mg   sertraline (ZOLOFT) 100 MG Tab   No No   Sig: Take 1.5 Tablets by mouth at bedtime. 1.5 tablets = 150 mg   topiramate (TOPAMAX) 50 MG tablet   No No   Sig: Take 1 Tablet by mouth every 12 hours.   traZODone (DESYREL) 100 MG Tab   No No   Sig: Take 1 Tablet by mouth at bedtime.      Facility-Administered Medications: None       Physical Exam  Temp:  [36.7 °C (98.1 °F)] 36.7 °C (98.1 °F)  Pulse:  [70] 70  Resp:  [16] 16  BP: (121)/(80) 121/80  SpO2:  [96 %] 96 %  Blood Pressure: 121/80   Temperature: 36.7 °C (98.1 °F)   Pulse: 70   Respiration: 16   Pulse Oximetry: 96 %       Physical Exam  Constitutional:       Appearance: Normal appearance. He is obese.   HENT:      Head: Normocephalic.      Nose: Nose normal.      Mouth/Throat:      Mouth: Mucous membranes are moist.   Eyes:      Pupils: Pupils  are equal, round, and reactive to light.   Cardiovascular:      Rate and Rhythm: Normal rate and regular rhythm.      Pulses: Normal pulses.   Pulmonary:      Effort: Pulmonary effort is normal.      Breath sounds: Normal breath sounds.   Abdominal:      General: Abdomen is flat. Bowel sounds are normal.      Palpations: Abdomen is soft.   Musculoskeletal:      Cervical back: Neck supple.   Skin:     General: Skin is warm.   Neurological:      Mental Status: He is alert.      Comments: Noted to be dysarthric  Strength 5 out of 5 in left upper and lower extremities  Strength 0 out of 5 in right upper and lower extremities, diminished sensation in right upper and lower extremities         Laboratory:  Recent Labs     06/09/23 1910   WBC 8.7   RBC 3.96*   HEMOGLOBIN 12.5*   HEMATOCRIT 35.4*   MCV 89.4   MCH 31.6   MCHC 35.3   RDW 42.2   PLATELETCT 317   MPV 11.1     Recent Labs     06/09/23 1910   SODIUM 134*   POTASSIUM 3.8   CHLORIDE 99   CO2 21   GLUCOSE 311*   BUN 12   CREATININE 0.87   CALCIUM 8.4*     Recent Labs     06/09/23 1910   ALTSGPT 22   ASTSGOT 31   ALKPHOSPHAT 83   TBILIRUBIN 0.3   GLUCOSE 311*     Recent Labs     06/09/23 2002   APTT 29.7   INR 1.03     No results for input(s): NTPROBNP in the last 72 hours.      Recent Labs     06/09/23 1910   TROPONINT 19       Imaging:  DX-CHEST-PORTABLE (1 VIEW)   Final Result      Hypoinflation without other evidence for acute cardiopulmonary disease.      CT-CTA NECK WITH & W/O-POST PROCESSING   Final Result      1.  No high-grade stenosis, large vessel occlusion, aneurysm or dissection.   2.  Unchanged heterogeneously enhancing mass in the anterior neck, possibly ectopic thyroid nodule.      CT-CTA HEAD WITH & W/O-POST PROCESS   Final Result      No large vessel occlusion, high-grade stenosis or aneurysm of the [place].      CT-CEREBRAL PERFUSION ANALYSIS   Final Result      1.  Cerebral blood flow less than 30% likely representing completed  infarct = 0 mL.      2.  T Max more than 6 seconds likely representing combination of completed infarct and ischemia = 5 mL.      3.  Mismatched volume likely representing ischemic brain/penumbra = 5 mL.      4.  Please note that the cerebral perfusion was performed on the limited brain tissue around the basal ganglia region. Infarct/ischemia outside the CT perfusion sections can be missed in this study.      CT-HEAD W/O   Final Result      1.  No acute intracranial abnormality.   2.  Diffuse low-attenuation again seen throughout the white matter.  Microvascular ischemic change versus demyelination or gliosis.             EKG:  I have personally reviewed the images and compared with prior images.    Assessment/Plan:  Justification for Admission Status  I anticipate this patient will require observation status as patient likely has conversion disorder and will hopefully resolve within a day or 2    Patient will need a Med/Surg bed on MEDICAL service .  The need is secondary to conversion disorder .    * Conversion disorder- (present on admission)  Assessment & Plan  Spoke with RYANNE, who consulted neurology.  Patient has psychiatric disorder and found to have sudden onset right upper and lower extremity weakness at around 530 this afternoon.  Patient has had multiple MRI brain's since 2014 which have never shown CVA.  CT head and neck negative for acute intracranial pathology.  As recommended by neurology, this is unlikely CVA and possibly conversion disorder.  Patient is not a safe discharge at this time as he is unable to ambulate.  Patient will be admitted tonight  for physical therapy and Occupational Therapy in AM.  Patient will be left n.p.o. for now as he is having difficulty speaking, and I do not feel comfortable with any oral intake at this time.    Uncontrolled diabetes mellitus with hyperglycemia (HCC)  Assessment & Plan  Sliding-scale    Postoperative hypothyroidism- (present on admission)  Assessment &  Plan  Continue Synthroid      enoxaparin ppx  VTE prophylaxis: enoxaparin ppx

## 2023-06-10 NOTE — ED NOTES
Received a call from lab that need to repeat sample for coagulation ( blue top). Informed Phlebotomist.

## 2023-06-10 NOTE — ASSESSMENT & PLAN NOTE
Spoke with RYANNE, who consulted neurology.  Patient has psychiatric disorder and found to have sudden onset right upper and lower extremity weakness at around 530 this afternoon.  Patient has had multiple MRI brain's since 2014 which have never shown CVA.  CT head and neck negative for acute intracranial pathology.  As recommended by neurology, this is unlikely CVA and possibly conversion disorder.  Patient is not a safe discharge at this time as he is unable to ambulate.  [facility]    -Passed swallow evaluation with SLP; started diet  -PT/OT recommended acute rehab; referral placed  -Psychiatry consultation placed for functional neurological evaluation

## 2023-06-10 NOTE — PROGRESS NOTES
Report received from night shift RN. Patient A&O, in bed resting comfortably. VSS, denies pain, bed in lowest position and locked, call light in reach.

## 2023-06-10 NOTE — HOSPITAL COURSE
MrLyric Prabhakar is a 42 y.o. male with a PMHx of leukoencephalopathy, seizure-like disorders and follow with neuro as an outpatient, conversion syndrome, bipolar disorder, DMT2, glaucoma, left eye blindness, who presented 6/9/2023 with right upper and lower extremity weakness that started this afternoon as patient was playing basketball.  He was then brought to the ED for further evaluation     Patient has a history of leukoencephalopathy and possible seizure-like disorder.  Well-known to neurology service at [facility].  Patient has had 17 MRI brain since 2014, none reviewed CVA.  Patient reports has had similar symptoms in the past of which she follows outpatient neurology.  There are concerns on patient's compliance and follow-up care as patient unable to give specific details regarding outpatient neurology follow-ups.  Patient denies any fever, no chills.  Patient endorses recent illness of which symptoms might be viral.  Patient also endorses that he was recently kicked out from his mom's house in [place] and currently is residing at the [facility].     In ED, patient found to have normal vital signs.  Pertinent labs include sugar 311.  CT head and neck negative for acute intracranial abnormality.  Chest x-ray showing no acute cardiopulmonary abnormality.  EKG showing normal sinus rhythm with no ischemic changes.    PT/OT evaluated patient of which she had recommended possible acute rehab placement.  Referral has been placed to physiatry.  Patient endorses that he has been in acute rehab multiple times in the past.    Ordered psychiatry consultation for functional neurological symptom disorder evaluation as patient presents with high concerns for psychogenic symptoms.    Patient monitored overnight with no events noted.  After speaking to the patient, patient endorses that he has recently been kicked out of his mom's house in [place] and currently is homeless.  Reassess patient's functionality and able to get  out of bed with no help, able to ambulate with minimal help but provided front wheel walker for support.  Patient does not appear to have any physical deficits at this time and able to do ADLs.  Discussed with patient that he likely may not be a candidate for acute rehab as he is able to do his ADLs currently.  Referral to outpatient physical therapy has been placed for continued therapay. Patient asked to refill all of his medication.  Provided patient with FWW to provide a support during ambulation.  Patient highly recommended to establish with a PCP and follow-up s/p hospitalization.  All questions and concerns answered prior to being discharged.  Patient discharged home.

## 2023-06-10 NOTE — PROGRESS NOTES
Per dysphagia screening tool, patient to be NPO and consult SLP. Oral intake not attempted. Discussed with patient, who stated is okay with this and feels like he just wants to sleep anyway's. Discussed with Dr Patel, who is aware. Discussed holding all PO meds. Discussed above information with pharmacy who scheduled PO meds accordingly

## 2023-06-10 NOTE — ED TRIAGE NOTES
Chief Complaint   Patient presents with    Possible Stroke     Pt bib REM[employer], while playing basket ball this evening  had sudden right sided paralysis, hx of the same     bS 410, hx of DM

## 2023-06-10 NOTE — ED NOTES
Bedside report received from previous shift, Milagro ANNE.  Assumed patient care. Verified patient identification. Checked on bed, connected to monitor,  with unlabored respirations. Pt from CT Scan.  Vital signs monitored.  Denied any new complaints. [place]ney in low position, side rail up for pt safety. Call light within reach. Will continue to monitor for any complications.

## 2023-06-10 NOTE — PROGRESS NOTES
[facility] Medicine Daily Progress Note    Date of Service  6/10/2023    Chief Complaint  Norm Prabhakar is a 43 y.o. male admitted 6/9/2023 with stroke like symptoms    Hospital Course  MrLyric Prabhakar is a 42 y.o. male with a PMHx of leukoencephalopathy, seizure-like disorders and follow with neuro as an outpatient, conversion syndrome, bipolar disorder, DMT2, glaucoma, left eye blindness, who presented 6/9/2023 with right upper and lower extremity weakness that started this afternoon as patient was playing basketball.  He was then brought to the ED for further evaluation     Patient has a history of leukoencephalopathy and possible seizure-like disorder.  Well-known to neurology service at [facility].  Patient has had 17 MRI brain since 2014, none reviewed CVA.  Patient reports has had similar symptoms in the past of which she follows outpatient neurology.  There are concerns on patient's compliance and follow-up care as patient unable to give specific details regarding outpatient neurology follow-ups.  Patient denies any fever, no chills.  Patient endorses recent illness of which symptoms might be viral.  Patient also endorses that he was recently kicked out from his mom's house in [place] and currently is residing at the [facility].     In ED, patient found to have normal vital signs.  Pertinent labs include sugar 311.  CT head and neck negative for acute intracranial abnormality.  Chest x-ray showing no acute cardiopulmonary abnormality.  EKG showing normal sinus rhythm with no ischemic changes.    PT/OT evaluated patient of which she had recommended possible acute rehab placement.  Referral has been placed to physiatry.  Patient endorses that he has been in acute rehab multiple times in the past.    Ordered psychiatry consultation for functional neurological symptom disorder evaluation as patient presents with high concerns for psychogenic symptoms.    Interval Problem Update  -Patient seen and examined.   Patient noted to have left eye blindness.  Significant psychiatric history noted for chart review.  Patient reports significant weakness, and numbness specifically in the right upper extremity, however, on examination, patient noted to have 5/5 strength along with tone.  Discussed recommendations by physical therapy of which referral to acute rehab has been placed.  -Plan of care: Continue to monitor patient for any strokelike symptoms; likely psychogenic as patient does have a significant history of conversion syndrome; psychiatry consultation has been placed  -Disposition: Patient anticipated to stay overnight; referral to acute rehab has been placed  -Lab work: Reviewed; expected  -VSS at this time    I have discussed this patient's plan of care and discharge plan at IDT rounds today with Case Management, Nursing, Nursing leadership, and other members of the IDT team.    Consultants/Specialty  NONE    Code Status  Full Code    Disposition  The patient is not medically cleared for discharge to home or a post-acute facility.  Anticipate discharge to: home with close outpatient follow-up    I have placed the appropriate orders for post-discharge needs.    Review of Systems  Review of Systems   Constitutional:  Positive for malaise/fatigue. Negative for chills and fever.   HENT: Negative.     Eyes:  Positive for blurred vision.   Respiratory: Negative.     Cardiovascular: Negative.    Gastrointestinal: Negative.    Genitourinary:  Positive for dysuria.   Musculoskeletal:  Positive for back pain and myalgias.   Skin: Negative.    Neurological:  Positive for dizziness, focal weakness and weakness.   Psychiatric/Behavioral:  Positive for depression.         Physical Exam  Temp:  [36.3 °C (97.4 °F)-36.7 °C (98.1 °F)] 36.4 °C (97.6 °F)  Pulse:  [42-70] 42  Resp:  [16-19] 17  BP: (111-127)/(66-85) 111/69  SpO2:  [92 %-96 %] 96 %    Physical Exam  Vitals reviewed.   Constitutional:       Appearance: He is obese.   HENT:       Head: Normocephalic.      Nose: Nose normal.      Mouth/Throat:      Mouth: Mucous membranes are moist.      Pharynx: Oropharynx is clear.   Eyes:      Pupils: Pupils are equal, round, and reactive to light.   Cardiovascular:      Rate and Rhythm: Normal rate and regular rhythm.      Pulses: Normal pulses.      Heart sounds: Normal heart sounds.   Pulmonary:      Effort: Pulmonary effort is normal.      Breath sounds: Normal breath sounds.   Abdominal:      General: Bowel sounds are normal.      Palpations: Abdomen is soft.   Musculoskeletal:         General: Tenderness present.      Cervical back: Normal range of motion and neck supple.   Skin:     General: Skin is dry.      Capillary Refill: Capillary refill takes 2 to 3 seconds.   Neurological:      Mental Status: He is alert. Mental status is at baseline.         Fluids    Intake/Output Summary (Last 24 hours) at 6/10/2023 1351  Last data filed at 6/10/2023 1335  Gross per 24 hour   Intake 780 ml   Output 500 ml   Net 280 ml       Laboratory  Recent Labs     06/09/23 1910   WBC 8.7   RBC 3.96*   HEMOGLOBIN 12.5*   HEMATOCRIT 35.4*   MCV 89.4   MCH 31.6   MCHC 35.3   RDW 42.2   PLATELETCT 317   MPV 11.1     Recent Labs     06/09/23 1910   SODIUM 134*   POTASSIUM 3.8   CHLORIDE 99   CO2 21   GLUCOSE 311*   BUN 12   CREATININE 0.87   CALCIUM 8.4*     Recent Labs     06/09/23 2002   APTT 29.7   INR 1.03               Imaging  DX-CHEST-PORTABLE (1 VIEW)   Final Result      Hypoinflation without other evidence for acute cardiopulmonary disease.      CT-CTA NECK WITH & W/O-POST PROCESSING   Final Result      1.  No high-grade stenosis, large vessel occlusion, aneurysm or dissection.   2.  Unchanged heterogeneously enhancing mass in the anterior neck, possibly ectopic thyroid nodule.      CT-CTA HEAD WITH & W/O-POST PROCESS   Final Result      No large vessel occlusion, high-grade stenosis or aneurysm of the [place].      CT-CEREBRAL PERFUSION ANALYSIS    Final Result      1.  Cerebral blood flow less than 30% likely representing completed infarct = 0 mL.      2.  T Max more than 6 seconds likely representing combination of completed infarct and ischemia = 5 mL.      3.  Mismatched volume likely representing ischemic brain/penumbra = 5 mL.      4.  Please note that the cerebral perfusion was performed on the limited brain tissue around the basal ganglia region. Infarct/ischemia outside the CT perfusion sections can be missed in this study.      CT-HEAD W/O   Final Result      1.  No acute intracranial abnormality.   2.  Diffuse low-attenuation again seen throughout the white matter.  Microvascular ischemic change versus demyelination or gliosis.              Assessment/Plan  * Conversion disorder- (present on admission)  Assessment & Plan  Spoke with ERP, who consulted neurology.  Patient has psychiatric disorder and found to have sudden onset right upper and lower extremity weakness at around 530 this afternoon.  Patient has had multiple MRI brain's since 2014 which have never shown CVA.  CT head and neck negative for acute intracranial pathology.  As recommended by neurology, this is unlikely CVA and possibly conversion disorder.  Patient is not a safe discharge at this time as he is unable to ambulate.  Patient will be admitted [facility]  for physical therapy and Occupational Therapy in AM.    -Passed swallow evaluation with SLP; started diet  -PT/OT recommended acute rehab; referral placed  -Psychiatry consultation placed for functional neurological evaluation    Uncontrolled diabetes mellitus with hyperglycemia (HCC)  Assessment & Plan  -Sliding-scale  -A1c on February 2023 at 7.8    Postoperative hypothyroidism- (present on admission)  Assessment & Plan  -Continue Synthroid         VTE prophylaxis: enoxaparin ppx    I have performed a physical exam and reviewed and updated ROS and Plan today (6/10/2023). In review of yesterday's note (6/9/2023), there are no  changes except as documented above.    IJanine A.P.RDANIEL performed a substantiated portion of the service face-to-face with same patient on the same date of service INDEPENDENTLY FROM THE MD ON ASSESSMENT, EXAMINATION, AND DISCUSSION AND PLAN OF CARE FOR 18 MINUTES.  I was personally involved in reviewing and conducting the medical decision making, including the information as described above.      ====================================================================================================================================================================================================================================  Please note that this dictation was created using voice recognition software. I have made every reasonable attempt to correct obvious errors, but there may be errors of grammar and possibly content that I did not discover before finalizing the note.    Electronically signed by:  Dr. GEOVANNY Fu, DNP, APRN, FNP-C  Hospitalist Services  [facility]  [phone]  [email or web address]  06/10/23                  [id number]

## 2023-06-10 NOTE — CONSULTS
Neurology STROKE CODE H&P  Neurohospitalist Service, [facility]    Referring Physician: Daniela Stephen M.D.    STROKE CODE: R side weakness      To obtain the most accurate data regarding the time called, and time patient seen, refer to the stroke run-sheet and chart.  For time of CT, refer to the radiology report. See A&P below for TPA Decision and door to needle time if and when applicable.    HPI: Norm Prabhakar is a 42 year old man, well-known to Neurology service- presenting with R side weakness.  Per medics, he was playing basketball, when he acutely developed R side weakness.   In the field, SBPs in 180s, FSBS 410.  On arrival to [facility], he was endorsing a headache.   NIHSS 12 as documented below with functional exam findings such as fluctuating effort, bradykinesia and stuttering speech pattern.  On chart review- similar presentations in 2/2022, 8/2021., 3/2021, 2/2021, 10/2020, 6/2020, 7/2019, 12/2019, 12/2018, 5/2018, and more.   He's had 17 MRI brain since 2014, none of which have demonstrated a stroke, he does have severe leukoencephalopathy.  On ROS, he has a questionable seizure history.  There was no seizure activity noted on this admission.  He is followed by [facility] Neurology for migraines.  He reports compliance with medications.  No recent infectious prodrome, no constitutional symptoms.    Review of systems: In addition to what is detailed in the HPI above, all other systems reviewed and are negative.    Past Medical History:    has a past medical history of Anxiety, ASTHMA, Bipolar 1 disorder (formerly [facility]), Depression, Diabetes (HCC), Fall, Glaucoma, Glaucoma (1982), Hypothyroidism, Indigestion, Mental disorder, Murmur, Pneumonia, Psychiatric problem (2002), S/P thyroidectomy, Seizure (HCC) (2010), Seizure disorder (formerly [facility]), and Unspecified disorder of thyroid.    FHx:  family history includes Heart Disease in his mother; Hypertension in his mother; Lung Disease in his mother;  "Stroke in his maternal grandmother.    SHx:   reports that he quit smoking about 3 years ago. His smoking use included cigarettes and cigars. He smoked an average of .25 packs per day. He has never used smokeless tobacco. He reports that he does not currently use alcohol. He reports current drug use. Drug: Inhaled.    Allergies:  Allergies   Allergen Reactions    Abilify      \"Feeling tired, like I don't even know whats going on around me\"    Fish      Pt reports salmon causes him to be sick to his stomach  Not listed on MAR noted 2/3/2021      Geodon [Ziprasidone Hcl] Anaphylaxis     Anaphylaxis per patient    Aripiprazole      \"I became lethargic.\"    Ziprasidone        Medications:  No current facility-administered medications for this encounter.    Current Outpatient Medications:     cyclobenzaprine (FLEXERIL) 10 mg Tab, Take 1 Tablet by mouth 3 times a day as needed for Moderate Pain or Muscle Spasms., Disp: 30 Tablet, Rfl: 0    atorvastatin (LIPITOR) 20 MG Tab, Take 1 Tablet by mouth every evening., Disp: 30 Tablet, Rfl: 2    budesonide-formoterol (SYMBICORT) 160-4.5 MCG/ACT Aerosol, Inhale 2 Puffs 2 times a day., Disp: 10.2 g, Rfl: 2    divalproex (DEPAKOTE) 500 MG Tablet Delayed Response, Take 3 Tablets by mouth at bedtime. 3 tablets = 1500 mg every evening, Disp: 90 Tablet, Rfl: 2    divalproex (DEPAKOTE) 500 MG Tablet Delayed Response, Take 1 Tablet by mouth every day., Disp: 90 Tablet, Rfl: 2    dorzolamide-timolol (COSOPT) 22.3-6.8 MG/ML Solution, Administer 1 Drop into both eyes 2 times a day., Disp: 10 mL, Rfl: 2    Fenofibrate 134 MG Cap, Take 134 mg by mouth every day., Disp: 30 Capsule, Rfl: 2    insulin glargine 100 UNIT/ML Solution Pen-injector injection, Inject 18 Units under the skin every day., Disp: 9 mL, Rfl: 2    insulin lispro 100 UNIT/ML SC SOPN injection PEN, Inject 2-12 Units under the skin 4 Times a Day,Before Meals and at Bedtime., Disp: 9 mL, Rfl: 2    levETIRAcetam (KEPPRA) 1000 MG " "tablet, Take 1 Tablet by mouth every 12 hours., Disp: 60 Tablet, Rfl: 2    levothyroxine (SYNTHROID) 200 MCG Tab, Take 1 Tablet by mouth every morning on an empty stomach. Take in addition to 25 mcg tablet for daily dose of 225 mcg, Disp: 30 Tablet, Rfl: 2    levothyroxine (SYNTHROID) 25 MCG Tab, Take 1 Tablet by mouth every morning on an empty stomach. Take in addition to 200 mcg tablet for daily dose of 225 mcg, Disp: 30 Tablet, Rfl: 2    montelukast (SINGULAIR) 10 MG Tab, Take 1 Tablet by mouth every evening., Disp: 30 Tablet, Rfl: 2    prazosin (MINIPRESS) 5 MG Cap, Take 1 Capsule by mouth every evening. 2 caps = 4 mg, Disp: 30 Capsule, Rfl: 2    sertraline (ZOLOFT) 100 MG Tab, Take 1.5 Tablets by mouth at bedtime. 1.5 tablets = 150 mg, Disp: 60 Tablet, Rfl: 2    topiramate (TOPAMAX) 50 MG tablet, Take 1 Tablet by mouth every 12 hours., Disp: 60 Tablet, Rfl: 2    traZODone (DESYREL) 100 MG Tab, Take 1 Tablet by mouth at bedtime., Disp: 30 Tablet, Rfl: 2    lurasidone (LATUDA) 20 MG Tab, Take 1 Tablet by mouth every evening., Disp: 60 Tablet, Rfl: 0    Cholecalciferol (VITAMIN D3) 2000 UNIT Cap, Take 4,000 Units by mouth every evening., Disp: , Rfl:     Omega-3 Fatty Acids (FISH OIL) 1000 MG Cap capsule, Take 1,000 mg by mouth 2 Times a Day., Disp: , Rfl:     Physical Examination:    Vitals:    06/09/23 1905 06/09/23 1910   BP:  121/80   Pulse:  70   Resp:  16   SpO2:  96%   Weight: 113 kg (250 lb)    Height: 1.93 m (6' 4\")          General: Patient is awake and in no acute distress  Eye: Examination of optic disks not indicated at this time given acuity of consult  Neck: There is normal range of motion  CV: Regular rate   Extremities:  Clear, dry, intact, without peripheral edema    NEUROLOGICAL EXAM:     Mental status: Awake, alert   Speech and language: Speech with stuttering pattern but fluent.   Cranial nerve exam: Visual fields are full to threat.  There is dysconjugate gaze, but no preference or deviation. "  Poor effort at smile- however face appears symmetric.   Motor exam: There is sustained antigravity with no downward drift in L arm and leg.  No movement in R arm and leg.   Sensory exam:  Reacts to tactile in all 4 distal extremities, cannot report feeling in R hemibody  Coordination: No ataxia on elicited movements.  Gait: Deferred due to patient preference.    NIHSS: National Institutes of Health Stroke Scale    [0] 1a:Level of Consciousness    0-alert 1-drowsy   2-stupor   3-coma  [0] 1b:LOC Questions                  0-both  1-one      2-neither  [0] 1c:LOC Commands                   0-both  1-one      2-neither  [1] 2: Best Gaze                     0-nl    1-partial  2-forced  [0] 3: Visual Fields                   0-nl    1-partial  2-complete 3-bilat  [0] 4: Facial Paresis                0-nl    1-minor    2-partial  3-full  MOTOR                       0-nl  [4] 5: Right Arm           1-drift  [0] 6: Left Arm             2-some effort vs gravity  [4] 7: Right Leg           3-no effort vs gravity  [0] 8: Left Leg             4-no movement                             x-untestable  [0] 9: Limb Ataxia                    0-abs   1-1_limb   2-2+_limbs       x-untestable  [2] 10:Sensory                        0-nl    1-partial  2-dense  [0] 11:Best Language/Aphasia         0-nl    1-mild/mod 2-severe   3-mute  [1] 12:Dysarthria                     0-nl    1-mild/mod 2-severe       x-untestable  [0] 13:Neglect/Inattention            0-none  1-partial  2-complete  [12] TOTAL    Baseline Modified Luke Scale (MRS): 0 = No symptoms    Objective Data:    Labs:  Lab Results   Component Value Date/Time    PROTHROMBTM 12.5 02/26/2022 07:52 PM    INR 0.96 02/26/2022 07:52 PM      Lab Results   Component Value Date/Time    WBC 8.4 06/06/2023 02:09 PM    RBC 4.47 (L) 06/06/2023 02:09 PM    HEMOGLOBIN 14.0 06/06/2023 02:09 PM    HEMATOCRIT 40.0 (L) 06/06/2023 02:09 PM    MCV 89.5 06/06/2023 02:09 PM    MCH 31.3 06/06/2023  02:09 PM    MCHC 35.0 06/06/2023 02:09 PM    MPV 11.1 06/06/2023 02:09 PM    NEUTSPOLYS 45.80 06/06/2023 02:09 PM    LYMPHOCYTES 42.20 (H) 06/06/2023 02:09 PM    MONOCYTES 8.10 06/06/2023 02:09 PM    EOSINOPHILS 1.40 06/06/2023 02:09 PM    BASOPHILS 1.40 06/06/2023 02:09 PM    ANISOCYTOSIS 2+ (A) 07/01/2021 07:44 PM      Lab Results   Component Value Date/Time    SODIUM 134 (L) 06/06/2023 02:09 PM    POTASSIUM 4.1 06/06/2023 02:09 PM    CHLORIDE 95 (L) 06/06/2023 02:09 PM    CO2 21 06/06/2023 02:09 PM    GLUCOSE 306 (H) 06/06/2023 02:09 PM    BUN 9 06/06/2023 02:09 PM    CREATININE 0.76 06/06/2023 02:09 PM    BUNCREATRAT 18.2 11/07/2021 03:16 AM    GLOMRATE 89 05/18/2023 06:44 PM      Lab Results   Component Value Date/Time    CHOLSTRLTOT 199 03/10/2021 03:48 AM    LDL see below 03/10/2021 03:48 AM    HDL 29 (A) 03/10/2021 03:48 AM    TRIGLYCERIDE 636 (H) 03/10/2021 03:48 AM       Lab Results   Component Value Date/Time    ALKPHOSPHAT 91 06/06/2023 02:09 PM    ASTSGOT 37 06/06/2023 02:09 PM    ALTSGPT 25 06/06/2023 02:09 PM    TBILIRUBIN 0.3 06/06/2023 02:09 PM        Imaging/Testing:    I interpreted and/or reviewed the patient's neuroimaging    DX-CHEST-PORTABLE (1 VIEW)   Final Result      Hypoinflation without other evidence for acute cardiopulmonary disease.      CT-CTA NECK WITH & W/O-POST PROCESSING   Final Result      1.  No high-grade stenosis, large vessel occlusion, aneurysm or dissection.   2.  Unchanged heterogeneously enhancing mass in the anterior neck, possibly ectopic thyroid nodule.      CT-CTA HEAD WITH & W/O-POST PROCESS   Final Result      No large vessel occlusion, high-grade stenosis or aneurysm of the [place].      CT-CEREBRAL PERFUSION ANALYSIS   Final Result      1.  Cerebral blood flow less than 30% likely representing completed infarct = 0 mL.      2.  T Max more than 6 seconds likely representing combination of completed infarct and ischemia = 5 mL.      3.  Mismatched volume  likely representing ischemic brain/penumbra = 5 mL.      4.  Please note that the cerebral perfusion was performed on the limited brain tissue around the basal ganglia region. Infarct/ischemia outside the CT perfusion sections can be missed in this study.      CT-HEAD W/O   Final Result      1.  No acute intracranial abnormality.   2.  Diffuse low-attenuation again seen throughout the white matter.  Microvascular ischemic change versus demyelination or gliosis.             Assessment and Plan:  Norm Prabhakar is a 42 year old man with recurring R side weakness/numbness, negative stroke evaluation each time, presenting with R side weakness and numbness.  There is clear embellishment, non-organic aspect of the clinical presentation- including stuttering speech, bradykinesia, poor effort, fluctuating weakness.   This is not an atypical migraine, and I have a higher suspicion for conversion disorder. Stroke protocol CT not suggestive of evolving ischemia.   From Stroke Neurology perspective- I do not recommend IV-thrombolytic therapy.  And I do not recommend further stroke evaluation as this has been done many times in the past.    Problem list:   R side weakness   Psychiatric disorder    Recommendations:   - I do not recommend IV-thrombolytic therapy   - I do not recommend additional stroke evaluation   - I do not recommend any modifications to current outpatient regimen from stroke prevention standpoint   - PT/OT/SLP   - please reconsult Neurology with any additional questions or concerns    Patient discussed with Dr. Stephen, WILLIAN attending.    Long Garcia MD  Stroke Neurology

## 2023-06-10 NOTE — THERAPY
Occupational Therapy   Initial Evaluation     Patient Name: Norm Prabhakar  Age:  43 y.o., Sex:  male  Medical Record #: [id number]  Today's Date: 6/10/2023     Precautions  Precautions: Fall Risk    Assessment  42 y/o M with sudden onset R sided weakness while playing basketball and admitted for possible conversion disorder. Imaging negative for CVA. Pt with Hx of leukoencephalopathy, possible seizure-like disorder, and bipolar disorder. Pt reports he is indep with I/ADLs at baseline and living at the shelter.   Pt seen for OT eval and presents with dysarthria and R sided deficits. Pt had limited active RUE movement, no subluxation noted, but was able to maintain grasp on walker. Pt is mod A for LB dressing, min A for bed mobility, and mod A to stand with FWW. Pt with difficulty weight shifting and unable to march in place. Pt would benefit from post-acute placement to address his functional deficits. Continue in house OT.   Recommend PM&R.     Plan    Occupational Therapy Initial Treatment Plan   Treatment Interventions: Self Care / Activities of Daily Living, Adaptive Equipment, Neuro Re-Education / Balance, Therapeutic Exercises, Therapeutic Activity  Treatment Frequency: 4 Times per Week  Duration: Until Therapy Goals Met    DC Equipment Recommendations: Unable to determine at this time  Discharge Recommendations: Recommend post-acute placement for additional occupational therapy services prior to discharge home     Subjective    Pt agreeable to work with OT. States today [birth year].      Objective       06/10/23 0930   Charge Group   OT Evaluation OT Evaluation Mod   Total Time Spent   OT Time Spent Yes   OT Evaluation (Minutes) 25   OT Total Time Spent (Calculated) 25    Services   Is patient using  services for this encounter? No   Initial Contact Note    Initial Contact Note Order Received and Verified, Occupational Therapy Evaluation in Progress with Full Report to Follow.   Prior  Living Situation   Housing / Facility Homeless;Other (Comments)  ([facility])   Lives with - Patient's Self Care Capacity Alone and Able to Care For Self   Prior Level of ADL Function   Self Feeding Independent   Grooming / Hygiene Independent   Bathing Independent   Dressing Independent   Toileting Independent   Prior Level of IADL Function   Driving / Transportation Utilizes Public Transportation   Leisure Interests Hobbies  (Plays basketball)   History of Falls   History of Falls Yes   Date of Last Fall   (Reports previous fall resulting in back injury years ago)   Precautions   Precautions Fall Risk   Vitals   O2 Delivery Device None - Room Air   Pain 0 - 10 Group   Therapist Pain Assessment Post Activity Pain Same as Prior to Activity  (c/o of back pain with movement)   Cognition    Cognition / Consciousness X   Speech/ Communication Dysarthric   Level of Consciousness Alert   Ability To Follow Commands 2 Step   Attention Impaired  (min cues for redirection to task, as pt would be talking and stop performing task, e.g. putting on his socks while talking - decreased multitasking)   Active ROM Upper Body   Active ROM Upper Body  X  (RUE - with limited active ROM, unable to perform scapular elevation, noted some muscle activation with attempts at elbow flexion, no digit flexion - able to hold onto walker with R grasp)   Dominant Hand Left   Comments LUE WDL   Strength Upper Body   Upper Body Strength  X   Comments RUE: shoulder 1/5, no subluxation paplated, 1/5 elbow, 0/5   (LUE WDL)   Sensation Upper Body   Upper Extremity Sensation    (Unknown, did not clearly report)   Neurological Concerns   Neurological Concerns Yes   Rt Upper Extremity Gross Motor Control Impaired   Rt Upper Extremity Fine Motor Control Impaired   Rt Upper Extremity Functional Use Impaired   Right In Hand Manipulation Impaired   Coordination Upper Body   Coordination X   Fine Motor Coordination absent   Gross Motor Coordination  absent   Balance Assessment   Sitting Balance (Static) Fair   Sitting Balance (Dynamic) Fair -   Standing Balance (Static) Poor   Standing Balance (Dynamic) Poor -   Weight Shift Sitting Good   Weight Shift Standing Poor   Comments able to shift weight R<>L with min A, unable to march in place   Bed Mobility    Supine to Sit Contact Guard Assist  (hooks LLE to move RLE, reports does at baseline 2/2 back pain - performed log roll)   Sit to Supine Minimal Assist  (BLE assist)   Scooting Contact Guard Assist   Rolling Standby Assist   ADL Assessment   Eating Supervision  (set-up to open containers, able to self feed with LUE)   Lower Body Dressing   (cues for martinez dressing, unable tp perform figure 4, mod A to don socks - able to don L sock, needed assist with R sock)   How much help from another person does the patient currently need...   Putting on and taking off regular lower body clothing? 2   Bathing (including washing, rinsing, and drying)? 2   Toileting, which includes using a toilet, bedpan, or urinal? 2   Putting on and taking off regular upper body clothing? 2   Taking care of personal grooming such as brushing teeth? 3   Eating meals? 3   6 Clicks Daily Activity Score 14   mRS Prior to admission   Prior to admission mRS 0   Modified Cass (mRS)   Modified Luke Score 4   Functional Mobility   Sit to Stand Moderate Assist  (x2)   Comments FWW - unable to march in palce to step   Visual Perception   Visual Perception    (wear glasses and reports blind in L eye - has glasses, that are broken)   Activity Tolerance   Sitting Edge of Bed 5 min   Standing 1 min   Patient / Family Goals   Patient / Family Goal #1 to get better   Short Term Goals   Short Term Goal # 1 Pt will complete 2 grooming tasks at EOB with SBA   Short Term Goal # 2 Pt will complete toileting with min A   Short Term Goal # 3 Pt will complete toileting transfers with min A   Short Term Goal # 4 Pt will complete UB dressing with SPV   Short  Term Goal # 5 Pt will complete LB dressing with SBA using compensatory strategies as needed   Education Group   Education Provided Role of Occupational Therapist   Role of Occupational Therapist Patient Response Patient;Acceptance;Explanation;Verbal Demonstration   Occupational Therapy Initial Treatment Plan    Treatment Interventions Self Care / Activities of Daily Living;Adaptive Equipment;Neuro Re-Education / Balance;Therapeutic Exercises;Therapeutic Activity   Treatment Frequency 4 Times per Week   Duration Until Therapy Goals Met   Problem List   Problem List Decreased Active Daily Living Skills;Decreased Homemaking Skills;Decreased Upper Extremity Strength Right;Decreased Upper Extremity AROM Right;Decreased Functional Mobility;Decreased Activity Tolerance;Safety Awareness Deficits / Cognition;Impaired Coordination Right Upper Extremity;Impaired Postural Control / Balance   Anticipated Discharge Equipment and Recommendations   DC Equipment Recommendations Unable to determine at this time   Discharge Recommendations Recommend post-acute placement for additional occupational therapy services prior to discharge home   Interdisciplinary Plan of Care Collaboration   IDT Collaboration with  Nursing;Physical Therapist   Patient Position at End of Therapy In Bed;Bed Alarm On;Call Light within Reach;Tray Table within Reach;Phone within Reach   Collaboration Comments Report given to RN   Session Information   Date / Session Number  6/10 1 (1/4, 6/16)

## 2023-06-11 ENCOUNTER — PHARMACY VISIT (OUTPATIENT)
Dept: PHARMACY | Facility: MEDICAL CENTER | Age: 43
End: 2023-06-11
Payer: COMMERCIAL

## 2023-06-11 VITALS
HEIGHT: 78 IN | TEMPERATURE: 97.5 F | BODY MASS INDEX: 29.54 KG/M2 | RESPIRATION RATE: 20 BRPM | HEART RATE: 53 BPM | OXYGEN SATURATION: 94 % | WEIGHT: 255.29 LBS | DIASTOLIC BLOOD PRESSURE: 50 MMHG | SYSTOLIC BLOOD PRESSURE: 91 MMHG

## 2023-06-11 LAB
AMPHET UR QL SCN: NEGATIVE
ANION GAP SERPL CALC-SCNC: 18 MMOL/L (ref 7–16)
BARBITURATES UR QL SCN: NEGATIVE
BENZODIAZ UR QL SCN: NEGATIVE
BUN SERPL-MCNC: 15 MG/DL (ref 8–22)
BZE UR QL SCN: NEGATIVE
CALCIUM SERPL-MCNC: 8.8 MG/DL (ref 8.5–10.5)
CANNABINOIDS UR QL SCN: NEGATIVE
CHLORIDE SERPL-SCNC: 98 MMOL/L (ref 96–112)
CO2 SERPL-SCNC: 18 MMOL/L (ref 20–33)
CREAT SERPL-MCNC: 1.03 MG/DL (ref 0.5–1.4)
ERYTHROCYTE [DISTWIDTH] IN BLOOD BY AUTOMATED COUNT: 43.7 FL (ref 35.9–50)
FENTANYL UR QL: NEGATIVE
GFR SERPLBLD CREATININE-BSD FMLA CKD-EPI: 94 ML/MIN/1.73 M 2
GLUCOSE BLD STRIP.AUTO-MCNC: 240 MG/DL (ref 65–99)
GLUCOSE BLD STRIP.AUTO-MCNC: 268 MG/DL (ref 65–99)
GLUCOSE SERPL-MCNC: 269 MG/DL (ref 65–99)
HCT VFR BLD AUTO: 36.3 % (ref 42–52)
HGB BLD-MCNC: 12.2 G/DL (ref 14–18)
MCH RBC QN AUTO: 31.4 PG (ref 27–33)
MCHC RBC AUTO-ENTMCNC: 33.6 G/DL (ref 32.3–36.5)
MCV RBC AUTO: 93.3 FL (ref 81.4–97.8)
METHADONE UR QL SCN: NEGATIVE
OPIATES UR QL SCN: NEGATIVE
OXYCODONE UR QL SCN: POSITIVE
PCP UR QL SCN: NEGATIVE
PLATELET # BLD AUTO: 269 K/UL (ref 164–446)
PMV BLD AUTO: 10.7 FL (ref 9–12.9)
POTASSIUM SERPL-SCNC: 4.3 MMOL/L (ref 3.6–5.5)
PROPOXYPH UR QL SCN: NEGATIVE
RBC # BLD AUTO: 3.89 M/UL (ref 4.7–6.1)
SODIUM SERPL-SCNC: 134 MMOL/L (ref 135–145)
WBC # BLD AUTO: 12.4 K/UL (ref 4.8–10.8)

## 2023-06-11 PROCEDURE — 96372 THER/PROPH/DIAG INJ SC/IM: CPT

## 2023-06-11 PROCEDURE — 99239 HOSP IP/OBS DSCHRG MGMT >30: CPT | Mod: FS | Performed by: INTERNAL MEDICINE

## 2023-06-11 PROCEDURE — 80307 DRUG TEST PRSMV CHEM ANLYZR: CPT

## 2023-06-11 PROCEDURE — A9270 NON-COVERED ITEM OR SERVICE: HCPCS | Mod: UD | Performed by: NURSE PRACTITIONER

## 2023-06-11 PROCEDURE — 80048 BASIC METABOLIC PNL TOTAL CA: CPT

## 2023-06-11 PROCEDURE — 700102 HCHG RX REV CODE 250 W/ 637 OVERRIDE(OP): Mod: UD | Performed by: NURSE PRACTITIONER

## 2023-06-11 PROCEDURE — G0378 HOSPITAL OBSERVATION PER HR: HCPCS

## 2023-06-11 PROCEDURE — A9270 NON-COVERED ITEM OR SERVICE: HCPCS | Mod: UD | Performed by: STUDENT IN AN ORGANIZED HEALTH CARE EDUCATION/TRAINING PROGRAM

## 2023-06-11 PROCEDURE — 82962 GLUCOSE BLOOD TEST: CPT

## 2023-06-11 PROCEDURE — 700102 HCHG RX REV CODE 250 W/ 637 OVERRIDE(OP): Mod: UD | Performed by: STUDENT IN AN ORGANIZED HEALTH CARE EDUCATION/TRAINING PROGRAM

## 2023-06-11 PROCEDURE — 85027 COMPLETE CBC AUTOMATED: CPT

## 2023-06-11 PROCEDURE — RXMED WILLOW AMBULATORY MEDICATION CHARGE: Performed by: NURSE PRACTITIONER

## 2023-06-11 RX ORDER — TOPIRAMATE 50 MG/1
50 TABLET, FILM COATED ORAL EVERY 12 HOURS
Qty: 60 TABLET | Refills: 0 | Status: SHIPPED | OUTPATIENT
Start: 2023-06-11 | End: 2023-07-11

## 2023-06-11 RX ORDER — LEVETIRACETAM 1000 MG/1
1000 TABLET ORAL EVERY 12 HOURS
Qty: 60 TABLET | Refills: 0 | Status: SHIPPED | OUTPATIENT
Start: 2023-06-11 | End: 2023-07-11

## 2023-06-11 RX ORDER — MONTELUKAST SODIUM 10 MG/1
10 TABLET ORAL EVERY EVENING
Qty: 30 TABLET | Refills: 0 | Status: SHIPPED | OUTPATIENT
Start: 2023-06-11 | End: 2023-07-11

## 2023-06-11 RX ORDER — BLOOD-GLUCOSE METER
KIT MISCELLANEOUS
Qty: 1 KIT | Refills: 0 | Status: SHIPPED | OUTPATIENT
Start: 2023-06-11 | End: 2023-08-13

## 2023-06-11 RX ORDER — ACETAMINOPHEN 160 MG
4000 TABLET,DISINTEGRATING ORAL EVERY EVENING
Qty: 60 CAPSULE | Refills: 0 | Status: SHIPPED | OUTPATIENT
Start: 2023-06-11 | End: 2023-07-11

## 2023-06-11 RX ORDER — DIVALPROEX SODIUM 500 MG/1
1500 TABLET, DELAYED RELEASE ORAL
Qty: 120 TABLET | Refills: 0 | Status: SHIPPED | OUTPATIENT
Start: 2023-06-11 | End: 2023-07-11

## 2023-06-11 RX ORDER — LANCETS 30 GAUGE
EACH MISCELLANEOUS
Qty: 100 EACH | Refills: 0 | Status: SHIPPED | OUTPATIENT
Start: 2023-06-11 | End: 2023-07-10 | Stop reason: SDUPTHER

## 2023-06-11 RX ORDER — TRAZODONE HYDROCHLORIDE 100 MG/1
100 TABLET ORAL
Qty: 30 TABLET | Refills: 0 | Status: SHIPPED | OUTPATIENT
Start: 2023-06-11 | End: 2023-07-11

## 2023-06-11 RX ORDER — LURASIDONE HYDROCHLORIDE 20 MG/1
20 TABLET, FILM COATED ORAL EVERY EVENING
Qty: 30 TABLET | Refills: 0 | Status: SHIPPED | OUTPATIENT
Start: 2023-06-11 | End: 2023-07-11

## 2023-06-11 RX ORDER — FENOFIBRATE 134 MG/1
134 CAPSULE ORAL DAILY
Qty: 30 CAPSULE | Refills: 0 | Status: SHIPPED | OUTPATIENT
Start: 2023-06-11 | End: 2023-07-11

## 2023-06-11 RX ORDER — INSULIN LISPRO 100 [IU]/ML
2-12 INJECTION, SOLUTION INTRAVENOUS; SUBCUTANEOUS
Qty: 15 ML | Refills: 0 | Status: SHIPPED | OUTPATIENT
Start: 2023-06-11 | End: 2023-07-10 | Stop reason: SDUPTHER

## 2023-06-11 RX ORDER — CHLORAL HYDRATE 500 MG
1000 CAPSULE ORAL 2 TIMES DAILY
Qty: 60 CAPSULE | Refills: 0 | Status: SHIPPED | OUTPATIENT
Start: 2023-06-11 | End: 2023-07-11

## 2023-06-11 RX ORDER — ATORVASTATIN CALCIUM 20 MG/1
20 TABLET, FILM COATED ORAL EVERY EVENING
Qty: 30 TABLET | Refills: 0 | Status: SHIPPED | OUTPATIENT
Start: 2023-06-11 | End: 2023-07-11

## 2023-06-11 RX ORDER — PRAZOSIN HYDROCHLORIDE 2 MG/1
4 CAPSULE ORAL EVERY EVENING
Qty: 60 CAPSULE | Refills: 0 | Status: SHIPPED | OUTPATIENT
Start: 2023-06-11 | End: 2023-07-11

## 2023-06-11 RX ORDER — LEVOTHYROXINE SODIUM 0.2 MG/1
200 TABLET ORAL
Qty: 30 TABLET | Refills: 0 | Status: SHIPPED | OUTPATIENT
Start: 2023-06-11 | End: 2023-07-11

## 2023-06-11 RX ORDER — CYCLOBENZAPRINE HCL 10 MG
10 TABLET ORAL 3 TIMES DAILY PRN
Qty: 90 TABLET | Refills: 0 | Status: SHIPPED | OUTPATIENT
Start: 2023-06-11 | End: 2023-07-11

## 2023-06-11 RX ORDER — DORZOLAMIDE HYDROCHLORIDE AND TIMOLOL MALEATE 20; 5 MG/ML; MG/ML
1 SOLUTION/ DROPS OPHTHALMIC 2 TIMES DAILY
Qty: 10 ML | Refills: 0 | Status: SHIPPED | OUTPATIENT
Start: 2023-06-11 | End: 2023-07-11

## 2023-06-11 RX ORDER — GLUCOSAMINE HCL/CHONDROITIN SU 500-400 MG
CAPSULE ORAL
Qty: 100 EACH | Refills: 0 | Status: SHIPPED | OUTPATIENT
Start: 2023-06-11 | End: 2023-07-10 | Stop reason: SDUPTHER

## 2023-06-11 RX ORDER — LEVOTHYROXINE SODIUM 0.03 MG/1
25 TABLET ORAL
Qty: 30 TABLET | Refills: 0 | Status: SHIPPED | OUTPATIENT
Start: 2023-06-11 | End: 2023-07-11

## 2023-06-11 RX ORDER — DIVALPROEX SODIUM 500 MG/1
500 TABLET, DELAYED RELEASE ORAL DAILY
Qty: 30 TABLET | Refills: 0 | Status: SHIPPED | OUTPATIENT
Start: 2023-06-11 | End: 2023-07-11

## 2023-06-11 RX ORDER — BUDESONIDE AND FORMOTEROL FUMARATE DIHYDRATE 160; 4.5 UG/1; UG/1
2 AEROSOL RESPIRATORY (INHALATION) 2 TIMES DAILY
Qty: 10.2 G | Refills: 0 | Status: SHIPPED | OUTPATIENT
Start: 2023-06-11 | End: 2023-08-13

## 2023-06-11 RX ADMIN — TOPIRAMATE 50 MG: 25 TABLET, FILM COATED ORAL at 05:31

## 2023-06-11 RX ADMIN — MOMETASONE FUROATE AND FORMOTEROL FUMARATE DIHYDRATE 2 PUFF: 200; 5 AEROSOL RESPIRATORY (INHALATION) at 05:32

## 2023-06-11 RX ADMIN — INSULIN LISPRO 8 UNITS: 100 INJECTION, SOLUTION INTRAVENOUS; SUBCUTANEOUS at 07:15

## 2023-06-11 RX ADMIN — LEVOTHYROXINE SODIUM 225 MCG: 0.07 TABLET ORAL at 10:31

## 2023-06-11 RX ADMIN — DIVALPROEX SODIUM 500 MG: 500 TABLET, DELAYED RELEASE ORAL at 05:31

## 2023-06-11 RX ADMIN — OXYCODONE AND ACETAMINOPHEN 1 TABLET: 5; 325 TABLET ORAL at 05:31

## 2023-06-11 RX ADMIN — FENOFIBRATE 134 MG: 134 CAPSULE ORAL at 05:32

## 2023-06-11 RX ADMIN — LEVETIRACETAM 1000 MG: 500 TABLET, FILM COATED ORAL at 05:31

## 2023-06-11 RX ADMIN — INSULIN LISPRO 2 UNITS: 100 INJECTION, SOLUTION INTRAVENOUS; SUBCUTANEOUS at 07:13

## 2023-06-11 NOTE — CONSULTS
"[facility] PSYCHIATRIC CONSULT:Intake  Reason for admission: sudden right-sided paralysis.  Patient has a history of on and off right-sided weakness has been seen several times in the past.  ...  has a little bit of a stuttering speech and bradykinesia  Consulting Physician/APN/PA:  ANIKET Garcia   Reason for Consult: conversion dis?  Consultant: Jimena Pina MD    Legal Status  vol      CC: not SI  HPI: 44 yo male who is eating with his R hand/arm who reports he has been depressed x 2  weeks after his mom kicked him out: she drinks and they get into arguments, \"I can't take it\". His 2 dogs are there and are like emotional support dogs for him. He reports he doesn't sleep well and the shelter, his appetite is good, energy is good, hopeless at times but not SI. Note : pt has trouble with consistency, and is not reliable: he has told his provider all his meds were left at mom's but tells me he is taking them everyday. I do not believe he is actively and intentionally changing information: he likely has a cognitive disorder.    Anxiety: hx of PTSD  Psychosis: denies    Neurology:  recurring R side weakness/numbness, negative stroke evaluation each time, presenting with R side weakness and numbness.  There is clear embellishment, non-organic aspect of the clinical presentation- including stuttering speech, bradykinesia, poor effort, fluctuating weakness.   This is not an atypical migraine, and I have a higher suspicion for conversion disorder    Chart(s) Review:  On file: please review. Has been seen many times throughout the years and given dx of intellectual disability, depression, anxiety, PTSD, mood dis unsp, conversion..... he has been on the current meds for many years as well.     Medical ROS:  Review of systems  per tx tm and neurology.     Per records hx of a TBI and sz.(Depakote and keppra)    Psychiatric Exam (MSE):  Vitals:Blood pressure 91/50, pulse (!) 53, " "temperature 36.4 °C (97.5 °F), temperature source Temporal, resp. rate 20, height 1.981 m (6' 6\"), weight 116 kg (255 lb 4.7 oz), SpO2 94 %.    Constitutional: obese, intermittent eye contact, L eye has a larger palpebral opening and reportedly he is blind. Has a protruding lower jaw. Unshaven. Cooperative to the degree able: he is not a reliable hx.  General Appearance:as noted  Musculoskeletal: eating with R hand and arm  Alert/Orientation: grossly intact   Attn/Concentration: intact  Fund of Knowledge: not tested  Memory recent/remote: grossly intacthowever per records, speech testing indicated problems with attn which can cause problems with memory    Speech: stuttering  Language:  fluent  Thought Content: no psychosis ,  SI/HI       Thought Process:   linear      Insight/Judgement:fair  Mood:depressed  Affect: blunted    Past Medical Hx:     Past Medical History:   Diagnosis Date    Anxiety     BIPOLAR    ASTHMA     Bipolar 1 disorder ([facility])     Depression     Diabetes ([facility])     Type II Diabetes    Fall     passed out 2 wks ago    Glaucoma     Glaucoma 1982    both eyes/ blind on left eye    Hypothyroidism     Indigestion     once in a while    Mental disorder     learning disabilities; speech impairment; developmental delays    Murmur     since birth    Pneumonia     remote    Psychiatric problem 2002    PTSD    S/P thyroidectomy     Seizure ([facility]) 2010    Seizure disorder ([facility])     Unspecified disorder of thyroid      Past Psychiatric Hx:  SI/SAs: says he has tried before    Medication Trials see current regimen       Family Psych Hx:  Family History   Problem Relation Age of Onset    Hypertension Mother     Heart Disease Mother     Lung Disease Mother     Stroke Maternal Grandmother        Social Hx:  Housing: homeless: does not like the shelter but does not want a group home because he likes being independent and not having to follow rules.  Financial:disability $900+ a month  Support:friends in [place] "   Education:special ed   Drugs/Alcohol: denies    Labs:  Lab Results   Component Value Date/Time    AMPHUR Negative 2023 0924    BARBSURINE Negative 2023 0924    BENZODIAZU Negative 2023    COCAINEMET Negative 202324    METHADONE Negative 2023    ECSTASY Negative 2016    OPIATES Negative 2023    OXYCODN Positive (A) 2023    PCPURINE Negative 2023    PROPOXY Negative 202324    CANNABINOID Negative 2023 0924     Recent Labs     23  1910 23  0159   WBC 8.7 12.4*   RBC 3.96* 3.89*   HEMOGLOBIN 12.5* 12.2*   HEMATOCRIT 35.4* 36.3*   MCV 89.4 93.3   MCH 31.6 31.4   RDW 42.2 43.7   PLATELETCT 317 269   MPV 11.1 10.7   NEUTSPOLYS 54.00  --    LYMPHOCYTES 35.70  --    MONOCYTES 6.60  --    EOSINOPHILS 1.40  --    BASOPHILS 1.30  --      Recent Labs     23  19123  0159   SODIUM 134* 134*   POTASSIUM 3.8 4.3   CHLORIDE 99 98   CO2 21 18*   GLUCOSE 311* 269*   BUN 12 15       Cranial Imaging: personally reviewed  Cranial CT: no significant findings  Cranial MRI: no significant findings      EKG: QTc:   433    EE this is an abnormal video EEG recording in the awake and drowsy state(s). Continuous right posterior quadrant spikes/sharps with brief periodic and rhythmic evolution, but without clear evidence due to short duration, to be considered a seizure. Frequent left posterior quadrant spike/sharps discharges, which may be independent or synchronous with the right posterior quadrant discharges. There is frequent right posterior temporal slowing. No clinical events reported by patient other than persistent right sided weakness and tingling. The findings suggest underlying area (s) of cortical irritability and structural abnormality. I suspect a posterior epileptogenic focus or foci, as multifocal epilepsy is possible in this case    Meds Current:  Scheduled Medications   Medication Dose  Frequency    atorvastatin  20 mg Q EVENING    mometasone-formoterol  2 Puff BID    divalproex  1,500 mg QHS    divalproex  500 mg DAILY    fenofibrate micronized  134 mg DAILY    levothyroxine  225 mcg AM ES    levETIRAcetam  1,000 mg Q12HRS    lurasidone  20 mg Q EVENING    montelukast  10 mg Q EVENING    prazosin  5 mg Q EVENING    sertraline  150 mg QHS    topiramate  50 mg Q12HRS    traZODone  100 mg QHS    enoxaparin (LOVENOX) injection  40 mg DAILY AT 1800    insulin GLARGINE  0.2 Units/kg/day Q EVENING    And    insulin lispro  0.2 Units/kg/day TID AC    And    insulin lispro  1-6 Units 4X/DAY ACHS     Allergies: Abilify, Fish, Geodon [ziprasidone hcl], Aripiprazole, Hydroxyzine, and Ziprasidone      Assessement    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 PTSD stable    Medical:   -HTN  -DM2  -Sz disorder: depakote and keppra (the latter can contribute to depression and cognitive problems in some persons)   -hypothyroidism    Recommendations:  Legal Status:  vol     Discussed/voalted: BRAXTON PRETTY    Medication and Other Recommendations: final orders as per Tx Tm  He is on an antidepressant, mood stabilizer, sleep aid. He says he has been taking them and they are prescribed.  Please give ref[facility] for [facility] which deals with intellectually disabled persons with mental disorder and can help with housing and [facility].     -alert team to provide referrals, transport, appointments as able and to document same         Thank you for the consult.   Signing off, please reconsult as needed    Discharge recommendations: per tx tm     If released from [facility]: Discharge Instructions:  -Reviewed safety plan: 911, ER, PCM, [facility], Suicide crisis line  -Please assist with outpatient Psychiatric/substance use follow up appointments at  discharge once medically cleared.

## 2023-06-11 NOTE — DISCHARGE SUMMARY
Discharge Summary    CHIEF COMPLAINT ON ADMISSION  Chief Complaint   Patient presents with    Possible Stroke     Pt ella ZACKERY, while playing basket ball this evening  had sudden right sided paralysis, hx of the same       Reason for Admission  Stroke     Admission Date  6/9/2023    CODE STATUS  Full Code    HPI & HOSPITAL COURSE    Mr. Norm Prabhakar is a 42 y.o. male with a PMHx of leukoencephalopathy, seizure-like disorders and follow with neuro as an outpatient, conversion syndrome, bipolar disorder, DMT2, glaucoma, left eye blindness, who presented 6/9/2023 with right upper and lower extremity weakness that started this afternoon as patient was playing basketball.  He was then brought to the ED for further evaluation     Patient has a history of leukoencephalopathy and possible seizure-like disorder.  Well-known to neurology service at [facility].  Patient has had 17 MRI brain since 2014, none reviewed CVA.  Patient reports has had similar symptoms in the past of which she follows outpatient neurology.  There are concerns on patient's compliance and follow-up care as patient unable to give specific details regarding outpatient neurology follow-ups.  Patient denies any fever, no chills.  Patient endorses recent illness of which symptoms might be viral.  Patient also endorses that he was recently kicked out from his mom's house in [place] and currently is residing at the [facility].     In ED, patient found to have normal vital signs.  Pertinent labs include sugar 311.  CT head and neck negative for acute intracranial abnormality.  Chest x-ray showing no acute cardiopulmonary abnormality.  EKG showing normal sinus rhythm with no ischemic changes.    PT/OT evaluated patient of which she had recommended possible acute rehab placement.  Referral has been placed to physiatry.  Patient endorses that he has been in acute rehab multiple times in the past.    Ordered psychiatry consultation for functional neurological  symptom disorder evaluation as patient presents with high concerns for psychogenic symptoms.    Patient monitored overnight with no events noted.  After speaking to the patient, patient endorses that he has recently been kicked out of his mom's house in [place] and currently is homeless.  Reassess patient's functionality and able to get out of bed with no help, able to ambulate with minimal help but provided front wheel walker for support.  Patient does not appear to have any physical deficits at this time and able to do ADLs.  Discussed with patient that he likely may not be a candidate for acute rehab as he is able to do his ADLs currently.  Referral to outpatient physical therapy has been placed for continued therapay. Patient asked to refill all of his medication.  Provided patient with FWW to provide a support during ambulation.  Patient highly recommended to establish with a PCP and follow-up s/p hospitalization.  All questions and concerns answered prior to being discharged.  Patient discharged home.    Therefore, he is discharged in good and stable condition to home with close outpatient follow-up.    The patient recovered much more quickly than anticipated on admission.    Discharge Date  06/11/23      FOLLOW UP ITEMS POST DISCHARGE  Please call [phone] to schedule PCP appointment for patient.    Required specialty appointments include:       Discharge Instructions per JOSE GarciaRLyricN.    -Follow-up with PCP s/p hospitalization  -Follow-up with psychiatry for management of conversion syndrome  -All of home medications has been refilled prior to discharge along with diabetic supplies  -Outpatient physical therapy referral has been placed    DIET: Diabetic and cardiac    ACTIVITY: As tolerated    DIAGNOSIS: Weakness    Return to ER if symptoms persist, chest pain, palpitations, shortness of breath, numbness, tingling, weakness, and high fevers.      DISCHARGE DIAGNOSES  Principal  Problem:    Conversion disorder (POA: Yes)  Active Problems:    Postoperative hypothyroidism (POA: Yes)    Uncontrolled diabetes mellitus with hyperglycemia (HCC) (POA: Unknown)    Weakness (POA: Yes)  Resolved Problems:    * No resolved hospital problems. *      FOLLOW UP  Future Appointments   Date Time Provider Department Center   6/23/2023  1:00 PM Marilin Rosenberg M.D. [facility]     [place] [address] [place] [id number]  [phone]  Schedule an appointment as soon as possible for a visit in 1 week(s)        MEDICATIONS ON DISCHARGE     Medication List        START taking these medications        Instructions   Alcohol Swabs   Doctor's comments: Per formulary preference. ICD-10 code: E11.65 Uncontrolled type 2 Diabetes Mellitus  Wipe site with prep pad prior to injection.     * Blood Glucose Meter Kit   Doctor's comments: Or per formulary preference. ICD-10 code: E11.65 Uncontrolled type 2 Diabetes Mellitus  Test blood sugar as recommended by provider. True Metrix blood glucose monitoring kit.     * Blood Glucose Test Strips   Doctor's comments: Or per formulary preference. ICD-10 code: E11.65 Uncontrolled type 2 Diabetes Mellitus  Use one True Metrix strip to test blood sugar three times daily before meals.     Insulin Pen Needle 32 G x 4 mm   Doctor's comments: Per patient/formulary preference. ICD-10 code: E11.65 Uncontrolled type 2 Diabetes Mellitus  Use one pen needle in pen device to inject insulin four times daily.     Lancets   Doctor's comments: Or per formulary preference. ICD-10 code: E11.65 Uncontrolled type 2 Diabetes Mellitus  Use one True Metrix lancet to test blood sugar three times daily before meals.           * This list has 2 medication(s) that are the same as other medications prescribed for you. Read the directions carefully, and ask your doctor or other care provider to review them with you.                CHANGE how you take these medications        Instructions    sertraline 50 MG Tabs  What changed:   medication strength  additional instructions  Commonly known as: Zoloft   Take 3 Tablets by mouth at bedtime for 30 days.  Dose: 150 mg            CONTINUE taking these medications        Instructions   atorvastatin 20 MG Tabs  Commonly known as: LIPITOR   Take 1 Tablet by mouth every evening for 30 days.  Dose: 20 mg     budesonide-formoterol 160-4.5 MCG/ACT Aero  Commonly known as: SYMBICORT   Inhale 2 Puffs 2 times a day.  Dose: 2 Puff     cyclobenzaprine 10 mg Tabs  Commonly known as: Flexeril   Take 1 Tablet by mouth 3 times a day as needed for Moderate Pain or Muscle Spasms for up to 30 days.  Dose: 10 mg     * divalproex 500 MG Tbec  Commonly known as: DEPAKOTE   Take 3 Tablets by mouth at bedtime for 30 days. 3 tablets = 1500 mg every evening  Dose: 1,500 mg     * divalproex 500 MG Tbec  Commonly known as: DEPAKOTE   Doctor's comments: 500 in the morning and 1500 at night.  Take 1 Tablet by mouth every day for 30 days.  Dose: 500 mg     dorzolamide-timolol 22.3-6.8 MG/ML Soln  Commonly known as: COSOPT   Administer 1 Drop into both eyes 2 times a day for 30 days.  Dose: 1 Drop     Fenofibrate 134 MG Caps   Take 134 mg by mouth every day for 30 days.  Dose: 134 mg     fish oil 1000 MG Caps capsule   Take 1 Capsule by mouth 2 times a day for 30 days.  Dose: 1,000 mg     insulin glargine 100 UNIT/ML Sopn injection  Generic drug: insulin glargine   Inject 18 Units under the skin every day for 30 days.  Dose: 18 Units     insulin lispro 100 UNIT/ML Sopn injection PEN  Commonly known as: HumaLOG,AdmeLOG   Inject 2-12 Units under the skin 4 Times a Day,Before Meals and at Bedtime for 30 days.  Dose: 2-12 Units     levetiracetam 1000 MG tablet  Commonly known as: KEPPRA   Take 1 Tablet by mouth every 12 hours for 30 days.  Dose: 1,000 mg     * levothyroxine 200 MCG Tabs  Commonly known as: SYNTHROID   Take 1 Tablet by mouth every morning on an empty stomach for 30 days. Take  "in addition to 25 mcg tablet for daily dose of 225 mcg  Dose: 200 mcg     * levothyroxine 25 MCG Tabs  Commonly known as: SYNTHROID   Take 1 Tablet by mouth every morning on an empty stomach for 30 days. Take in addition to 200 mcg tablet for daily dose of 225 mcg  Dose: 25 mcg     lurasidone 20 MG Tabs  Commonly known as: LATUDA   Take 1 Tablet by mouth every evening for 30 days.  Dose: 20 mg     montelukast 10 MG Tabs  Commonly known as: SINGULAIR   Take 1 Tablet by mouth every evening for 30 days.  Dose: 10 mg     prazosin 5 MG Caps  Commonly known as: MINIPRESS   Take 1 Capsule by mouth every evening for 30 days. 2 caps = 4 mg  Dose: 5 mg     topiramate 50 MG tablet  Commonly known as: TOPAMAX   Take 1 Tablet by mouth every 12 hours for 30 days.  Dose: 50 mg     traZODone 100 MG Tabs  Commonly known as: DESYREL   Take 1 Tablet by mouth at bedtime for 30 days.  Dose: 100 mg     Vitamin D3 2000 UNIT Caps   Take 2 Capsules by mouth every evening for 30 days.  Dose: 4,000 Units           * This list has 4 medication(s) that are the same as other medications prescribed for you. Read the directions carefully, and ask your doctor or other care provider to review them with you.                  Allergies  Allergies   Allergen Reactions    Abilify      \"Feeling tired, like I don't even know whats going on around me\"    Fish      Pt reports salmon causes him to be sick to his stomach  Not listed on MAR noted 2/3/2021      Geodon [Ziprasidone Hcl] Anaphylaxis     Anaphylaxis per patient    Aripiprazole      \"I became lethargic.\"    Hydroxyzine Itching     Pt will only state \"I feel itchy when I take it    Ziprasidone        DIET  Orders Placed This Encounter   Procedures    Diet Order Diet: Consistent CHO (Diabetic)     Standing Status:   Standing     Number of Occurrences:   1     Order Specific Question:   Diet:     Answer:   Consistent CHO (Diabetic) [4]       ACTIVITY  As tolerated.  Weight bearing as " tolerated    CONSULTATIONS  NONE    PROCEDURES  NONE    IMAGING  DX-CHEST-PORTABLE (1 VIEW)   Final Result      Hypoinflation without other evidence for acute cardiopulmonary disease.      CT-CTA NECK WITH & W/O-POST PROCESSING   Final Result      1.  No high-grade stenosis, large vessel occlusion, aneurysm or dissection.   2.  Unchanged heterogeneously enhancing mass in the anterior neck, possibly ectopic thyroid nodule.      CT-CTA HEAD WITH & W/O-POST PROCESS   Final Result      No large vessel occlusion, high-grade stenosis or aneurysm of the [place].      CT-CEREBRAL PERFUSION ANALYSIS   Final Result      1.  Cerebral blood flow less than 30% likely representing completed infarct = 0 mL.      2.  T Max more than 6 seconds likely representing combination of completed infarct and ischemia = 5 mL.      3.  Mismatched volume likely representing ischemic brain/penumbra = 5 mL.      4.  Please note that the cerebral perfusion was performed on the limited brain tissue around the basal ganglia region. Infarct/ischemia outside the CT perfusion sections can be missed in this study.      CT-HEAD W/O   Final Result      1.  No acute intracranial abnormality.   2.  Diffuse low-attenuation again seen throughout the white matter.  Microvascular ischemic change versus demyelination or gliosis.               LABORATORY  Lab Results   Component Value Date    SODIUM 134 (L) 06/11/2023    POTASSIUM 4.3 06/11/2023    CHLORIDE 98 06/11/2023    CO2 18 (L) 06/11/2023    GLUCOSE 269 (H) 06/11/2023    BUN 15 06/11/2023    CREATININE 1.03 06/11/2023    GLOMRATE 89 05/18/2023        Lab Results   Component Value Date    WBC 12.4 (H) 06/11/2023    HEMOGLOBIN 12.2 (L) 06/11/2023    HEMATOCRIT 36.3 (L) 06/11/2023    PLATELETCT 269 06/11/2023         =============================================================================================================================================================================================================================================================================================  Please note that this dictation was created using voice recognition software. I have made every reasonable attempt to correct obvious errors, but there may be errors of grammar and possibly content that I did not discover before finalizing the note.    Electronically signed by:  Dr. GEOVANNY Fu, DNP, APRN, FNP-C  Hospitalist Services  [facility]  [phone]  [email or web address]  06/11/23                1048

## 2023-06-11 NOTE — DISCHARGE PLANNING
PM&R referral from DANY Garcia. Discharge summary is completed No physiatry consult per protocol, Please consider outpatient PM&R with Dr. Paulette Morocho.

## 2023-06-11 NOTE — PROGRESS NOTES
Pt dc'd home. IV and monitor removed; monitor room notified. Pt left unit via wheelchair with RN; MTM provided ride, Meds to bed order delivered. Personal belongings with pt when leaving unit. Pt given discharge instructions prior to leaving unit including where to  prescriptions and when to follow-up; verbalizes understanding. Copy of discharge instructions with pt and in the chart.

## 2023-06-11 NOTE — PROGRESS NOTES
Assumed care of patient and heard report from Adelina ANNE.  Admit profile and assessment completed, patient is A&Ox 4, reports 7/10 pain and is medicated per MAR, on room air, NIHSS completed with a score of 15  Patient updated on plan of care and all needs addressed at this time.  Upper bed rails in place, bed in lowest locked setting, non slip socks on, belongings and call light in reach, bed alarm in place, falls precaution and safety education reinforced, patient verbalized understanding.

## 2023-06-11 NOTE — DISCHARGE INSTRUCTIONS
FOLLOW UP ITEMS POST DISCHARGE  Please call [phone] to schedule PCP appointment for patient.    Required specialty appointments include:       Discharge Instructions per ANIKET Garcia    -Follow-up with PCP s/p hospitalization  -Follow-up with psychiatry for management of conversion syndrome  -All of home medications has been refilled prior to discharge along with diabetic supplies  -Outpatient physical therapy referral has been placed    DIET: Diabetic and cardiac    ACTIVITY: As tolerated    DIAGNOSIS: Weakness    Return to ER if symptoms persist, chest pain, palpitations, shortness of breath, numbness, tingling, weakness, and high fevers.        Discharge Instructions    Discharged to other by taxi with escort. Discharged via wheelchair, hospital escort: Yes.  Special equipment needed: Walker    Be sure to schedule a follow-up appointment with your primary care doctor or any specialists as instructed.     Discharge Plan:   Diet Plan: Discussed  Activity Level: Discussed  Confirmed Follow up Appointment: Patient to Call and Schedule Appointment  Confirmed Symptoms Management: Discussed  Medication Reconciliation Updated: Yes    I understand that a diet low in cholesterol, fat, and sodium is recommended for good health. Unless I have been given specific instructions below for another diet, I accept this instruction as my diet prescription.   Other diet: Regular     Special Instructions: None    -Is this patient being discharged with medication to prevent blood clots?  No    Is patient discharged on Warfarin / Coumadin?   No

## 2023-06-11 NOTE — CARE PLAN
The patient is Watcher - Medium risk of patient condition declining or worsening    Shift Goals  Clinical Goals: pain control, rest, transfer  Patient Goals: comfort, rest  Family Goals: JUANCARLOS    Progress made toward(s) clinical / shift goals:  Pain management progressing through medication (see MAR), rest, positioning, and distraction.  Patient educated on plan of care, medications, side effects, non-pharmalogical pain control, and safety/fall precautions.  Patient remains free from falls and fall preventions/safety education reinforced.      Problem: Pain - Standard  Goal: Alleviation of pain or a reduction in pain to the patient’s comfort goal  Description: Target End Date:  Prior to discharge or change in level of care    Document on Vitals flowsheet    1.  Document pain using the appropriate pain scale per order or unit policy  2.  Educate and implement non-pharmacologic comfort measures (i.e. relaxation, distraction, massage, cold/heat therapy, etc.)  3.  Pain management medications as ordered  4.  Reassess pain after pain med administration per policy  5.  If opiods administered assess patient's response to pain medication is appropriate per POSS sedation scale  6.  Follow pain management plan developed in collaboration with patient and interdisciplinary team (including palliative care or pain specialists if applicable)  Outcome: Progressing     Problem: Knowledge Deficit - Standard  Goal: Patient and family/care givers will demonstrate understanding of plan of care, disease process/condition, diagnostic tests and medications  Description: Target End Date:  1-3 days or as soon as patient condition allows    Document in Patient Education    1.  Patient and family/caregiver oriented to unit, equipment, visitation policy and means for communicating concern  2.  Complete/review Learning Assessment  3.  Assess knowledge level of disease process/condition, treatment plan, diagnostic tests and medications  4.  Explain  disease process/condition, treatment plan, diagnostic tests and medications  Outcome: Progressing     Problem: Fall Risk  Goal: Patient will remain free from falls  Description: Target End Date:  Prior to discharge or change in level of care    Document interventions on the Mariluz Ball Fall Risk Assessment    1.  Assess for fall risk factors  2.  Implement fall precautions  Outcome: Progressing

## 2023-06-23 ENCOUNTER — HOSPITAL ENCOUNTER (EMERGENCY)
Facility: MEDICAL CENTER | Age: 43
End: 2023-06-23
Attending: EMERGENCY MEDICINE
Payer: MEDICAID

## 2023-06-23 VITALS
WEIGHT: 242 LBS | TEMPERATURE: 98 F | SYSTOLIC BLOOD PRESSURE: 132 MMHG | HEIGHT: 78 IN | RESPIRATION RATE: 16 BRPM | OXYGEN SATURATION: 98 % | HEART RATE: 72 BPM | DIASTOLIC BLOOD PRESSURE: 88 MMHG | BODY MASS INDEX: 28 KG/M2

## 2023-06-23 DIAGNOSIS — E03.9 HYPOTHYROIDISM, UNSPECIFIED TYPE: ICD-10-CM

## 2023-06-23 DIAGNOSIS — R53.1 WEAKNESS: ICD-10-CM

## 2023-06-23 LAB
ANION GAP SERPL CALC-SCNC: 14 MMOL/L (ref 7–16)
BASOPHILS # BLD AUTO: 1.7 % (ref 0–1.8)
BASOPHILS # BLD: 0.13 K/UL (ref 0–0.12)
BUN SERPL-MCNC: 9 MG/DL (ref 8–22)
CALCIUM SERPL-MCNC: 9.4 MG/DL (ref 8.5–10.5)
CHLORIDE SERPL-SCNC: 97 MMOL/L (ref 96–112)
CO2 SERPL-SCNC: 22 MMOL/L (ref 20–33)
CREAT SERPL-MCNC: 0.75 MG/DL (ref 0.5–1.4)
EOSINOPHIL # BLD AUTO: 0.1 K/UL (ref 0–0.51)
EOSINOPHIL NFR BLD: 1.3 % (ref 0–6.9)
ERYTHROCYTE [DISTWIDTH] IN BLOOD BY AUTOMATED COUNT: 41.5 FL (ref 35.9–50)
ETHANOL BLD-MCNC: <10.1 MG/DL
GFR SERPLBLD CREATININE-BSD FMLA CKD-EPI: 116 ML/MIN/1.73 M 2
GLUCOSE SERPL-MCNC: 263 MG/DL (ref 65–99)
HCT VFR BLD AUTO: 40.2 % (ref 42–52)
HGB BLD-MCNC: 14.1 G/DL (ref 14–18)
IMM GRANULOCYTES # BLD AUTO: 0.06 K/UL (ref 0–0.11)
IMM GRANULOCYTES NFR BLD AUTO: 0.8 % (ref 0–0.9)
LYMPHOCYTES # BLD AUTO: 2.88 K/UL (ref 1–4.8)
LYMPHOCYTES NFR BLD: 37.5 % (ref 22–41)
MCH RBC QN AUTO: 31.1 PG (ref 27–33)
MCHC RBC AUTO-ENTMCNC: 35.1 G/DL (ref 32.3–36.5)
MCV RBC AUTO: 88.7 FL (ref 81.4–97.8)
MONOCYTES # BLD AUTO: 0.66 K/UL (ref 0–0.85)
MONOCYTES NFR BLD AUTO: 8.6 % (ref 0–13.4)
NEUTROPHILS # BLD AUTO: 3.86 K/UL (ref 1.82–7.42)
NEUTROPHILS NFR BLD: 50.1 % (ref 44–72)
NRBC # BLD AUTO: 0 K/UL
NRBC BLD-RTO: 0 /100 WBC (ref 0–0.2)
PLATELET # BLD AUTO: 337 K/UL (ref 164–446)
PMV BLD AUTO: 10.1 FL (ref 9–12.9)
POTASSIUM SERPL-SCNC: 4.2 MMOL/L (ref 3.6–5.5)
RBC # BLD AUTO: 4.53 M/UL (ref 4.7–6.1)
SODIUM SERPL-SCNC: 133 MMOL/L (ref 135–145)
T4 FREE SERPL-MCNC: 0.13 NG/DL (ref 0.93–1.7)
TSH SERPL DL<=0.005 MIU/L-ACNC: 82.6 UIU/ML (ref 0.38–5.33)
VALPROATE SERPL-MCNC: <2.8 UG/ML (ref 50–100)
WBC # BLD AUTO: 7.7 K/UL (ref 4.8–10.8)

## 2023-06-23 PROCEDURE — A9270 NON-COVERED ITEM OR SERVICE: HCPCS | Mod: UD | Performed by: EMERGENCY MEDICINE

## 2023-06-23 PROCEDURE — 84439 ASSAY OF FREE THYROXINE: CPT

## 2023-06-23 PROCEDURE — 84443 ASSAY THYROID STIM HORMONE: CPT

## 2023-06-23 PROCEDURE — 82077 ASSAY SPEC XCP UR&BREATH IA: CPT

## 2023-06-23 PROCEDURE — 700102 HCHG RX REV CODE 250 W/ 637 OVERRIDE(OP): Mod: UD | Performed by: EMERGENCY MEDICINE

## 2023-06-23 PROCEDURE — 36415 COLL VENOUS BLD VENIPUNCTURE: CPT

## 2023-06-23 PROCEDURE — 80164 ASSAY DIPROPYLACETIC ACD TOT: CPT

## 2023-06-23 PROCEDURE — 80048 BASIC METABOLIC PNL TOTAL CA: CPT

## 2023-06-23 PROCEDURE — 85025 COMPLETE CBC W/AUTO DIFF WBC: CPT

## 2023-06-23 PROCEDURE — 99284 EMERGENCY DEPT VISIT MOD MDM: CPT

## 2023-06-23 RX ORDER — DIVALPROEX SODIUM 500 MG/1
500 TABLET, DELAYED RELEASE ORAL ONCE
Status: COMPLETED | OUTPATIENT
Start: 2023-06-23 | End: 2023-06-23

## 2023-06-23 RX ORDER — LEVETIRACETAM 500 MG/1
1000 TABLET ORAL ONCE
Status: COMPLETED | OUTPATIENT
Start: 2023-06-23 | End: 2023-06-23

## 2023-06-23 RX ADMIN — DIVALPROEX SODIUM 500 MG: 500 TABLET, DELAYED RELEASE ORAL at 13:16

## 2023-06-23 RX ADMIN — LEVOTHYROXINE SODIUM 225 MCG: 0.2 TABLET ORAL at 13:16

## 2023-06-23 RX ADMIN — LEVETIRACETAM 1000 MG: 500 TABLET, FILM COATED ORAL at 13:17

## 2023-06-23 ASSESSMENT — FIBROSIS 4 INDEX: FIB4 SCORE: 1.32

## 2023-06-23 NOTE — DISCHARGE INSTRUCTIONS
You were seen in the ER for weakness.  Your labs showed high blood glucose and I would like you to continue all of your medications as directed.  Your valproic acid/Depakote was not detectable.  Please take all of your medications as prescribed.  Your thyroid hormone was also quite low.  I have given you your Keppra, Depakote, and Synthroid here today.  It is imperative that you follow-up with your primary care physician this coming week for recheck.  If you develop new or worsening symptoms return immediately to the ER.

## 2023-06-23 NOTE — ED NOTES
Pt ambulated without assistance thru the pod with a slow gait stopping one time on the way back for some dizziness, ERP and RN aware.  Pt fed per ERP request

## 2023-06-23 NOTE — ED TRIAGE NOTES
"Chief Complaint   Patient presents with    Weakness     Patient reports generalizes weakness and lightheadedness that started this morning when he woke up.       42 yo male to triage for above complaint.   Patient reports history of seizures, thinks he may have had a seizure last night in his sleep. Patient reports taking keppra as prescribed.    Pt is alert and oriented, speaking in full sentences, follows commands and responds appropriately to questions.     Patient placed back in lobby and educated on triage process. Asked to inform RN of any changes.    BP (!) 134/95   Pulse 71   Temp 36.6 °C (97.8 °F) (Temporal)   Resp 18   Ht 1.981 m (6' 6\")   Wt 110 kg (242 lb)   SpO2 97%   BMI 27.97 kg/m²     "

## 2023-06-23 NOTE — ED PROVIDER NOTES
ED Provider Note    CHIEF COMPLAINT  Chief Complaint   Patient presents with    Weakness     Patient reports generalizes weakness and lightheadedness that started this morning when he woke up.       EXTERNAL RECORDS REVIEWED  Inpatient Notes -patient was admitted to this facility earlier this month for right-sided weakness and neurologic symptoms.  His symptoms were felt to be suspicious for functional/conversion disorder.  It was noted in his discharge summary that he has had multiple negative MRIs since 2014 and no further work-up was needed per neurology.  Per psychiatry he has been seen multiple times in the past and the recommendation was for outpatient behavioral therapy.  He was discharged to follow-up as an outpatient.    HPI/ROS  LIMITATION TO HISTORY   Select: Behavior  OUTSIDE HISTORIAN(S):  None    Norm Prabhakar is a 43 y.o. male with a history of leukoencephalopathy and seizure-like disorders on Keppra and valproic acid who presents with a chief complaint of generalized weakness that started this morning.  The patient thinks that he may have had a seizure overnight and woke up feeling weak and dizzy.  He states that these are familiar symptoms for him and he has them frequently although is unable to tell me what his prior diagnoses have been.  He was able to ambulate to the restroom although he felt dizzy and required support from the wall while ambulating.  He called EMS immediately.  He notes that he did not take any have his seizure medications this morning.  He denies any fevers.  He has had some nausea but no vomiting.  He does not have any pain.  He denies any alcohol or drug use.    PAST MEDICAL HISTORY   has a past medical history of Anxiety, ASTHMA, Bipolar 1 disorder ([facility]), Depression, Diabetes (HCC), Fall, Glaucoma, Glaucoma (1982), Hypothyroidism, Indigestion, Mental disorder, Murmur, Pneumonia, Psychiatric problem (2002), S/P thyroidectomy, Seizure ([facility]) (2010), Seizure disorder ([facility]),  and Unspecified disorder of thyroid.    SURGICAL HISTORY   has a past surgical history that includes eye surgery; thyroid lobectomy; and other.    FAMILY HISTORY  Family History   Problem Relation Age of Onset    Hypertension Mother     Heart Disease Mother     Lung Disease Mother     Stroke Maternal Grandmother        SOCIAL HISTORY  Social History     Tobacco Use    Smoking status: Former     Packs/day: 0.25     Types: Cigarettes, Cigars     Quit date: 4/1/2020     Years since quitting: 3.2    Smokeless tobacco: Never    Tobacco comments:     3 cigarettes a day   Vaping Use    Vaping Use: Every day    Substances: Nicotine, THC, CBD, Flavoring    Devices: Disposable   Substance and Sexual Activity    Alcohol use: Not Currently    Drug use: Yes     Types: Inhaled     Comment: marijuana    Sexual activity: Not Currently       CURRENT MEDICATIONS  Home Medications       Reviewed by Jessica Ries R.N. (Registered Nurse) on 06/23/23 at 1033  Med List Status: Partial     Medication Last Dose Status   Alcohol Swabs  Active   atorvastatin (LIPITOR) 20 MG Tab  Active   Blood Glucose Monitoring Suppl (BLOOD GLUCOSE SYSTEM SONDRA) Kit  Active   budesonide-formoterol (SYMBICORT) 160-4.5 MCG/ACT Aerosol  Active   Cholecalciferol (VITAMIN D3) 2000 UNIT Cap  Active   cyclobenzaprine (FLEXERIL) 10 mg Tab  Active   divalproex (DEPAKOTE) 500 MG Tablet Delayed Response  Active   divalproex (DEPAKOTE) 500 MG Tablet Delayed Response  Active   dorzolamide-timolol (COSOPT) 22.3-6.8 MG/ML Solution  Active   Fenofibrate 134 MG Cap  Active   glucose blood strip  Active   insulin glargine 100 UNIT/ML SC SOPN injection  Active   insulin lispro 100 UNIT/ML SC SOPN injection PEN  Active   Insulin Pen Needle 32 G x 4 mm  Active   Lancets  Active   levetiracetam (KEPPRA) 1000 MG tablet  Active   levothyroxine (SYNTHROID) 200 MCG Tab  Active   levothyroxine (SYNTHROID) 25 MCG Tab  Active   lurasidone (LATUDA) 20 MG Tab  Active   montelukast  "(SINGULAIR) 10 MG Tab  Active   Omega-3 Fatty Acids (FISH OIL) 1000 MG Cap capsule  Active   prazosin (MINIPRESS) 2 MG Cap  Active   sertraline (ZOLOFT) 50 MG Tab  Active   topiramate (TOPAMAX) 50 MG tablet  Active   traZODone (DESYREL) 100 MG Tab  Active                    ALLERGIES  Allergies   Allergen Reactions    Abilify      \"Feeling tired, like I don't even know whats going on around me\"    Fish      Pt reports salmon causes him to be sick to his stomach  Not listed on MAR noted 2/3/2021      Geodon [Ziprasidone Hcl] Anaphylaxis     Anaphylaxis per patient    Aripiprazole      \"I became lethargic.\"    Hydroxyzine Itching     Pt will only state \"I feel itchy when I take it    Ziprasidone        PHYSICAL EXAM  VITAL SIGNS: BP (!) 134/95   Pulse 71   Temp 36.6 °C (97.8 °F) (Temporal)   Resp 18   Ht 1.981 m (6' 6\")   Wt 110 kg (242 lb)   SpO2 97%   BMI 27.97 kg/m²    Physical Exam  Vitals and nursing note reviewed.   Constitutional:       Appearance: Normal appearance.   HENT:      Head: Normocephalic and atraumatic.      Right Ear: External ear normal.      Left Ear: External ear normal.      Nose: Nose normal.      Mouth/Throat:      Mouth: Mucous membranes are moist.      Pharynx: Oropharynx is clear.   Eyes:      Extraocular Movements: Extraocular movements intact.      Conjunctiva/sclera: Conjunctivae normal.   Cardiovascular:      Rate and Rhythm: Normal rate and regular rhythm.   Pulmonary:      Effort: Pulmonary effort is normal.      Breath sounds: Normal breath sounds.   Abdominal:      Palpations: Abdomen is soft.      Tenderness: There is no abdominal tenderness.   Musculoskeletal:         General: Normal range of motion.      Cervical back: Normal range of motion and neck supple.      Right lower leg: No edema.      Left lower leg: No edema.   Skin:     General: Skin is warm and dry.   Neurological:      Mental Status: He is alert.      Deep Tendon Reflexes: Reflexes normal.      Comments: " Moves all 4 extremities equally.  Normal strength and sensation all 4 extremities.  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  Normal vision from the right eye.   Psychiatric:         Mood and Affect: Mood normal.         Behavior: Behavior normal.       DIAGNOSTIC STUDIES / PROCEDURES  LABS  Results for orders placed or performed during the hospital encounter of 06/23/23   CBC WITH DIFFERENTIAL   Result Value Ref Range    WBC 7.7 4.8 - 10.8 K/uL    RBC 4.53 (L) 4.70 - 6.10 M/uL    Hemoglobin 14.1 14.0 - 18.0 g/dL    Hematocrit 40.2 (L) 42.0 - 52.0 %    MCV 88.7 81.4 - 97.8 fL    MCH 31.1 27.0 - 33.0 pg    MCHC 35.1 32.3 - 36.5 g/dL    RDW 41.5 35.9 - 50.0 fL    Platelet Count 337 164 - 446 K/uL    MPV 10.1 9.0 - 12.9 fL    Neutrophils-Polys 50.10 44.00 - 72.00 %    Lymphocytes 37.50 22.00 - 41.00 %    Monocytes 8.60 0.00 - 13.40 %    Eosinophils 1.30 0.00 - 6.90 %    Basophils 1.70 0.00 - 1.80 %    Immature Granulocytes 0.80 0.00 - 0.90 %    Nucleated RBC 0.00 0.00 - 0.20 /100 WBC    Neutrophils (Absolute) 3.86 1.82 - 7.42 K/uL    Lymphs (Absolute) 2.88 1.00 - 4.80 K/uL    Monos (Absolute) 0.66 0.00 - 0.85 K/uL    Eos (Absolute) 0.10 0.00 - 0.51 K/uL    Baso (Absolute) 0.13 (H) 0.00 - 0.12 K/uL    Immature Granulocytes (abs) 0.06 0.00 - 0.11 K/uL    NRBC (Absolute) 0.00 K/uL   Basic Metabolic Panel   Result Value Ref Range    Sodium 133 (L) 135 - 145 mmol/L    Potassium 4.2 3.6 - 5.5 mmol/L    Chloride 97 96 - 112 mmol/L    Co2 22 20 - 33 mmol/L    Glucose 263 (H) 65 - 99 mg/dL    Bun 9 8 - 22 mg/dL    Creatinine 0.75 0.50 - 1.40 mg/dL    Calcium 9.4 8.5 - 10.5 mg/dL    Anion Gap 14.0 7.0 - 16.0   DIAGNOSTIC ALCOHOL   Result Value Ref Range    Diagnostic Alcohol <10.1 <10.1 mg/dL   TSH WITH REFLEX TO FT4   Result Value Ref Range    TSH 82.600 (H) 0.380 - 5.330 uIU/mL   VALPROIC ACID   Result Value Ref Range    Valproic Acid <2.8 (L) 50.0 - 100.0 ug/mL   FREE  THYROXINE   Result Value Ref Range    Free T-4 0.13 (L) 0.93 - 1.70 ng/dL   ESTIMATED GFR   Result Value Ref Range    GFR (CKD-EPI) 116 >60 mL/min/1.73 m 2     RADIOLOGY  Radiologist interpretation:   No orders to display     COURSE & MEDICAL DECISION MAKING    ED Observation Status? Yes; I am placing the patient in to an observation status due to a diagnostic uncertainty as well as therapeutic intensity. Patient placed in observation status at 11:21 AM, 6/23/2023.     Observation plan is as follows: Labs, medication, observation    Upon Reevaluation, the patient's condition has: Improved; and will be discharged.    Patient discharged from ED Observation status at 1:09 PM (Time) 6/23/2023 (Date).     INITIAL ASSESSMENT, COURSE AND PLAN  Care Narrative: This is a 43 year old male who is here with dizziness and generalized weakness that he suspects is sequelae of a seizure he had overnight. He reportedly takes depakote and keppra and states that he is compliant. He notes that he experiences these symptoms frequently and these do not seem more severe in character or intensity than those he has experienced in the past.     DDx includes, but is not limited to, hypo/hyperglycemia, electrolyte abnormality, thyroid pathology, infection, BPPV, stroke, labyrinthitis, dehydration, medication non-compliance, medication side effect.    Arrives very slightly hypertensive but AF with otherwise normal VS. Appears well hydrated and non-toxic. Is tolerating PO here without difficulty. 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 He has normal strength and sensation in all four extremities. He has normal finger to nose and heel to shin. DTRs are 2+ bilaterally. No leg swelling.    CBC without leukocytosis or left shift. BMP demonstrates hyperglycemia - he does have a history of IDDM but AG and CO2 are normal. Has pseudohyponatremia which corrects for hyperglycemia. Renal function and remainder of  electrolytes are normal. Valproic acid levels are undetectable. Patient was given his home dose of keppra and depakote as I suspect he is non-compliant with his medication. He does not appear post-ictal, there is no tongue trauma, and he was not incontinent. Hard to tell if he actually had a seizure overnight but my suspicion is low and even if he did, he appears to be back to baseline. I do not feel that brain imaging is warranted. TSH is profoundly elevated and free T4 is quite low. There is no evidence for myxedema coma - normal DTRs and no leg swelling. He is alert, interactive, has normal VS. I do worry that he is non-compliant with his synthroid. I gave him a dose in the ED and encouraged him to take his medication as directed. He is ambulating with a steady gait although reported some mild dizziness. He had a CTA head/neck 6/9/23 and without focal neurologic deficits along with his reports that his symptoms today are exactly the same as prior, I once again feel that additional imaging is not indicated. There is no indication for hospitalization. Discharged in good and stable condition with strict return precautions.     ADDITIONAL PROBLEM LIST  None  DISPOSITION AND DISCUSSIONS  I have discussed management of the patient with the following physicians and MANNY's:  None    Discussion of management with other [facility] or appropriate source(s): None     Escalation of care considered, and ultimately not performed:acute inpatient care management, however at this time, the patient is most appropriate for outpatient management    Barriers to care at this time, including but not limited to:  None .     Decision tools and prescription drugs considered including, but not limited to:  None .    FINAL DIAGNOSIS  1. Weakness    2. Hypothyroidism, unspecified type      Electronically signed by: Geovanni Hammer M.D., 6/23/2023 11:21 AM

## 2023-06-23 NOTE — ED NOTES
Pt ambulated to rm 63 from triage.  Agree with triage note.  Pt denies specific complaint other than he doesnt feel great.  States maybe had a seizure in his sleep last night.  Pt atraumatic, no oral trauma noted, no incontinence

## 2023-07-07 ENCOUNTER — APPOINTMENT (OUTPATIENT)
Dept: RADIOLOGY | Facility: MEDICAL CENTER | Age: 43
End: 2023-07-07
Attending: EMERGENCY MEDICINE
Payer: MEDICAID

## 2023-07-07 ENCOUNTER — HOSPITAL ENCOUNTER (EMERGENCY)
Facility: MEDICAL CENTER | Age: 43
End: 2023-07-07
Attending: EMERGENCY MEDICINE
Payer: MEDICAID

## 2023-07-07 VITALS
SYSTOLIC BLOOD PRESSURE: 137 MMHG | HEIGHT: 78 IN | DIASTOLIC BLOOD PRESSURE: 74 MMHG | RESPIRATION RATE: 16 BRPM | WEIGHT: 242 LBS | BODY MASS INDEX: 28 KG/M2 | HEART RATE: 75 BPM | TEMPERATURE: 97.9 F | OXYGEN SATURATION: 95 %

## 2023-07-07 DIAGNOSIS — S93.401A SPRAIN OF RIGHT ANKLE, UNSPECIFIED LIGAMENT, INITIAL ENCOUNTER: ICD-10-CM

## 2023-07-07 DIAGNOSIS — S00.83XA CONTUSION OF FACE, INITIAL ENCOUNTER: ICD-10-CM

## 2023-07-07 PROCEDURE — A9270 NON-COVERED ITEM OR SERVICE: HCPCS | Mod: UD | Performed by: EMERGENCY MEDICINE

## 2023-07-07 PROCEDURE — 70355 PANORAMIC X-RAY OF JAWS: CPT

## 2023-07-07 PROCEDURE — 99284 EMERGENCY DEPT VISIT MOD MDM: CPT

## 2023-07-07 PROCEDURE — 700102 HCHG RX REV CODE 250 W/ 637 OVERRIDE(OP): Mod: UD | Performed by: EMERGENCY MEDICINE

## 2023-07-07 PROCEDURE — 73610 X-RAY EXAM OF ANKLE: CPT | Mod: RT

## 2023-07-07 RX ORDER — OXYCODONE HYDROCHLORIDE 5 MG/1
5 TABLET ORAL ONCE
Status: COMPLETED | OUTPATIENT
Start: 2023-07-07 | End: 2023-07-07

## 2023-07-07 RX ADMIN — OXYCODONE HYDROCHLORIDE 5 MG: 5 TABLET ORAL at 12:32

## 2023-07-07 ASSESSMENT — FIBROSIS 4 INDEX: FIB4 SCORE: 1.05

## 2023-07-07 NOTE — ED NOTES
Pt given discharge instructions/home care instructions explained, pt verbalized understanding of instructions given/pt understands the importance of follow up, pt to ER [facility], reporting he has a bus pass and is capable.
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
## 2023-07-09 ENCOUNTER — APPOINTMENT (OUTPATIENT)
Dept: RADIOLOGY | Facility: MEDICAL CENTER | Age: 43
End: 2023-07-09
Attending: EMERGENCY MEDICINE
Payer: MEDICAID

## 2023-07-09 ENCOUNTER — HOSPITAL ENCOUNTER (EMERGENCY)
Facility: MEDICAL CENTER | Age: 43
End: 2023-07-10
Attending: EMERGENCY MEDICINE
Payer: MEDICAID

## 2023-07-09 DIAGNOSIS — G40.909 SEIZURE DISORDER (HCC): ICD-10-CM

## 2023-07-09 DIAGNOSIS — E87.1 HYPONATREMIA: ICD-10-CM

## 2023-07-09 DIAGNOSIS — R73.9 HYPERGLYCEMIA: ICD-10-CM

## 2023-07-09 DIAGNOSIS — E11.9 TYPE 2 DIABETES MELLITUS WITHOUT COMPLICATION, WITHOUT LONG-TERM CURRENT USE OF INSULIN (HCC): ICD-10-CM

## 2023-07-09 LAB
ALBUMIN SERPL BCP-MCNC: 4.1 G/DL (ref 3.2–4.9)
ALBUMIN/GLOB SERPL: 1.7 G/DL
ALP SERPL-CCNC: 85 U/L (ref 30–99)
ALT SERPL-CCNC: 15 U/L (ref 2–50)
ANION GAP SERPL CALC-SCNC: 17 MMOL/L (ref 7–16)
APPEARANCE UR: CLEAR
AST SERPL-CCNC: 11 U/L (ref 12–45)
BACTERIA #/AREA URNS HPF: NEGATIVE /HPF
BASOPHILS # BLD AUTO: 1 % (ref 0–1.8)
BASOPHILS # BLD: 0.09 K/UL (ref 0–0.12)
BILIRUB SERPL-MCNC: 0.3 MG/DL (ref 0.1–1.5)
BILIRUB UR QL STRIP.AUTO: NEGATIVE
BUN SERPL-MCNC: 14 MG/DL (ref 8–22)
CALCIUM ALBUM COR SERPL-MCNC: 9.2 MG/DL (ref 8.5–10.5)
CALCIUM SERPL-MCNC: 9.3 MG/DL (ref 8.5–10.5)
CHLORIDE SERPL-SCNC: 82 MMOL/L (ref 96–112)
CO2 SERPL-SCNC: 21 MMOL/L (ref 20–33)
COLOR UR: YELLOW
CREAT SERPL-MCNC: 1.05 MG/DL (ref 0.5–1.4)
EKG IMPRESSION: NORMAL
EOSINOPHIL # BLD AUTO: 0.16 K/UL (ref 0–0.51)
EOSINOPHIL NFR BLD: 1.7 % (ref 0–6.9)
EPI CELLS #/AREA URNS HPF: NEGATIVE /HPF
ERYTHROCYTE [DISTWIDTH] IN BLOOD BY AUTOMATED COUNT: 39.6 FL (ref 35.9–50)
GFR SERPLBLD CREATININE-BSD FMLA CKD-EPI: 91 ML/MIN/1.73 M 2
GLOBULIN SER CALC-MCNC: 2.4 G/DL (ref 1.9–3.5)
GLUCOSE BLD STRIP.AUTO-MCNC: 422 MG/DL (ref 65–99)
GLUCOSE BLD STRIP.AUTO-MCNC: 595 MG/DL (ref 65–99)
GLUCOSE SERPL-MCNC: 643 MG/DL (ref 65–99)
GLUCOSE UR STRIP.AUTO-MCNC: >=1000 MG/DL
HCT VFR BLD AUTO: 36.3 % (ref 42–52)
HGB BLD-MCNC: 13.1 G/DL (ref 14–18)
HYALINE CASTS #/AREA URNS LPF: ABNORMAL /LPF
IMM GRANULOCYTES # BLD AUTO: 0.12 K/UL (ref 0–0.11)
IMM GRANULOCYTES NFR BLD AUTO: 1.3 % (ref 0–0.9)
KETONES UR STRIP.AUTO-MCNC: NEGATIVE MG/DL
LEUKOCYTE ESTERASE UR QL STRIP.AUTO: NEGATIVE
LYMPHOCYTES # BLD AUTO: 2.81 K/UL (ref 1–4.8)
LYMPHOCYTES NFR BLD: 30.2 % (ref 22–41)
MCH RBC QN AUTO: 31.7 PG (ref 27–33)
MCHC RBC AUTO-ENTMCNC: 36.1 G/DL (ref 32.3–36.5)
MCV RBC AUTO: 87.9 FL (ref 81.4–97.8)
MICRO URNS: ABNORMAL
MONOCYTES # BLD AUTO: 0.73 K/UL (ref 0–0.85)
MONOCYTES NFR BLD AUTO: 7.8 % (ref 0–13.4)
NEUTROPHILS # BLD AUTO: 5.39 K/UL (ref 1.82–7.42)
NEUTROPHILS NFR BLD: 58 % (ref 44–72)
NITRITE UR QL STRIP.AUTO: NEGATIVE
NRBC # BLD AUTO: 0 K/UL
NRBC BLD-RTO: 0 /100 WBC (ref 0–0.2)
PH UR STRIP.AUTO: 5.5 [PH] (ref 5–8)
PLATELET # BLD AUTO: 265 K/UL (ref 164–446)
PMV BLD AUTO: 11.1 FL (ref 9–12.9)
POTASSIUM SERPL-SCNC: 3.9 MMOL/L (ref 3.6–5.5)
PROT SERPL-MCNC: 6.5 G/DL (ref 6–8.2)
PROT UR QL STRIP: NEGATIVE MG/DL
RBC # BLD AUTO: 4.13 M/UL (ref 4.7–6.1)
RBC # URNS HPF: ABNORMAL /HPF
RBC UR QL AUTO: ABNORMAL
SODIUM SERPL-SCNC: 120 MMOL/L (ref 135–145)
SP GR UR STRIP.AUTO: 1.04
TROPONIN T SERPL-MCNC: 20 NG/L (ref 6–19)
UROBILINOGEN UR STRIP.AUTO-MCNC: 0.2 MG/DL
WBC # BLD AUTO: 9.3 K/UL (ref 4.8–10.8)
WBC #/AREA URNS HPF: ABNORMAL /HPF

## 2023-07-09 PROCEDURE — 700105 HCHG RX REV CODE 258: Mod: JZ,UD | Performed by: EMERGENCY MEDICINE

## 2023-07-09 PROCEDURE — 71045 X-RAY EXAM CHEST 1 VIEW: CPT

## 2023-07-09 PROCEDURE — 96376 TX/PRO/DX INJ SAME DRUG ADON: CPT

## 2023-07-09 PROCEDURE — 93005 ELECTROCARDIOGRAM TRACING: CPT | Performed by: EMERGENCY MEDICINE

## 2023-07-09 PROCEDURE — 36415 COLL VENOUS BLD VENIPUNCTURE: CPT

## 2023-07-09 PROCEDURE — 85025 COMPLETE CBC W/AUTO DIFF WBC: CPT

## 2023-07-09 PROCEDURE — 99285 EMERGENCY DEPT VISIT HI MDM: CPT

## 2023-07-09 PROCEDURE — 96374 THER/PROPH/DIAG INJ IV PUSH: CPT

## 2023-07-09 PROCEDURE — 80053 COMPREHEN METABOLIC PANEL: CPT

## 2023-07-09 PROCEDURE — 82962 GLUCOSE BLOOD TEST: CPT

## 2023-07-09 PROCEDURE — 700102 HCHG RX REV CODE 250 W/ 637 OVERRIDE(OP): Mod: UD | Performed by: EMERGENCY MEDICINE

## 2023-07-09 PROCEDURE — 81001 URINALYSIS AUTO W/SCOPE: CPT

## 2023-07-09 PROCEDURE — 80048 BASIC METABOLIC PNL TOTAL CA: CPT

## 2023-07-09 PROCEDURE — 84484 ASSAY OF TROPONIN QUANT: CPT

## 2023-07-09 RX ORDER — SODIUM CHLORIDE, SODIUM LACTATE, POTASSIUM CHLORIDE, CALCIUM CHLORIDE 600; 310; 30; 20 MG/100ML; MG/100ML; MG/100ML; MG/100ML
1000 INJECTION, SOLUTION INTRAVENOUS ONCE
Status: COMPLETED | OUTPATIENT
Start: 2023-07-09 | End: 2023-07-09

## 2023-07-09 RX ORDER — SODIUM CHLORIDE 9 MG/ML
1000 INJECTION, SOLUTION INTRAVENOUS ONCE
Status: COMPLETED | OUTPATIENT
Start: 2023-07-09 | End: 2023-07-09

## 2023-07-09 RX ADMIN — SODIUM CHLORIDE, POTASSIUM CHLORIDE, SODIUM LACTATE AND CALCIUM CHLORIDE 1000 ML: 600; 310; 30; 20 INJECTION, SOLUTION INTRAVENOUS at 20:01

## 2023-07-09 RX ADMIN — INSULIN HUMAN 10 UNITS: 100 INJECTION, SOLUTION PARENTERAL at 21:28

## 2023-07-09 RX ADMIN — SODIUM CHLORIDE 1000 ML: 9 INJECTION, SOLUTION INTRAVENOUS at 21:28

## 2023-07-09 ASSESSMENT — FIBROSIS 4 INDEX: FIB4 SCORE: 1.05

## 2023-07-10 VITALS
SYSTOLIC BLOOD PRESSURE: 120 MMHG | BODY MASS INDEX: 28 KG/M2 | WEIGHT: 242 LBS | HEIGHT: 78 IN | DIASTOLIC BLOOD PRESSURE: 77 MMHG | TEMPERATURE: 98.1 F | OXYGEN SATURATION: 95 % | HEART RATE: 74 BPM | RESPIRATION RATE: 20 BRPM

## 2023-07-10 LAB
ANION GAP SERPL CALC-SCNC: 11 MMOL/L (ref 7–16)
BUN SERPL-MCNC: 13 MG/DL (ref 8–22)
CALCIUM SERPL-MCNC: 8.5 MG/DL (ref 8.5–10.5)
CHLORIDE SERPL-SCNC: 90 MMOL/L (ref 96–112)
CO2 SERPL-SCNC: 26 MMOL/L (ref 20–33)
CREAT SERPL-MCNC: 0.85 MG/DL (ref 0.5–1.4)
GFR SERPLBLD CREATININE-BSD FMLA CKD-EPI: 112 ML/MIN/1.73 M 2
GLUCOSE SERPL-MCNC: 282 MG/DL (ref 65–99)
POTASSIUM SERPL-SCNC: 3.2 MMOL/L (ref 3.6–5.5)
SODIUM SERPL-SCNC: 127 MMOL/L (ref 135–145)

## 2023-07-10 RX ORDER — INSULIN LISPRO 100 [IU]/ML
2-12 INJECTION, SOLUTION INTRAVENOUS; SUBCUTANEOUS
Qty: 15 ML | Refills: 0 | Status: ACTIVE | OUTPATIENT
Start: 2023-07-10 | End: 2023-07-17 | Stop reason: SDUPTHER

## 2023-07-10 RX ORDER — LANCETS 30 GAUGE
EACH MISCELLANEOUS
Qty: 100 EACH | Refills: 0 | Status: SHIPPED | OUTPATIENT
Start: 2023-07-10 | End: 2023-08-13

## 2023-07-10 RX ORDER — GLUCOSAMINE HCL/CHONDROITIN SU 500-400 MG
CAPSULE ORAL
Qty: 100 EACH | Refills: 0 | Status: SHIPPED | OUTPATIENT
Start: 2023-07-10 | End: 2023-08-13
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
## 2023-07-10 NOTE — ED TRIAGE NOTES
"./76   Pulse 70   Temp 36.5 °C (97.7 °F) (Temporal)   Resp 16   Ht 1.981 m (6' 6\")   Wt 110 kg (242 lb)   SpO2 92%   BMI 27.97 kg/m²   .  Chief Complaint   Patient presents with    Weakness     Pt BIBA from [facility], c/c of increased lethargy, increased urination and increased thirst. Per EMS pt . Pt hx of type 2 of DM. Pt hasn't taken insulin in three days. Pt denies SOB, chest pain, n/v.      Pt BIBA from [facility] for increased lethargy, increased urination, increased thirst. Pt HX of diabetes type 2, pt hasnt taken insulin in 3 days.  Denies chest pain, SOB, N/V.  Pt Aox4, GCS 15. Blood drawn and sent to lab.  on glucometer  "

## 2023-07-10 NOTE — ED NOTES
Pt    Methotrexate Pregnancy And Lactation Text: This medication is Pregnancy Category X and is known to cause fetal harm. This medication is excreted in breast milk.

## 2023-07-10 NOTE — DISCHARGE INSTRUCTIONS
Please take your insulin as written.  Follow-up with your primary care doctor.  Monitor your sugars daily.  Your sodium is somewhat low-increase salt in your diet and increase fluid intake.  Recommend checking with your doctor in the next week for repeat chemistries.   English

## 2023-07-13 ENCOUNTER — HOSPITAL ENCOUNTER (EMERGENCY)
Facility: MEDICAL CENTER | Age: 43
End: 2023-07-13
Attending: EMERGENCY MEDICINE
Payer: MEDICAID

## 2023-07-13 VITALS
TEMPERATURE: 98 F | HEIGHT: 78 IN | WEIGHT: 242 LBS | OXYGEN SATURATION: 94 % | SYSTOLIC BLOOD PRESSURE: 122 MMHG | HEART RATE: 60 BPM | RESPIRATION RATE: 17 BRPM | BODY MASS INDEX: 28 KG/M2 | DIASTOLIC BLOOD PRESSURE: 79 MMHG

## 2023-07-13 DIAGNOSIS — R73.9 HYPERGLYCEMIA: ICD-10-CM

## 2023-07-13 LAB
ALBUMIN SERPL BCP-MCNC: 4.5 G/DL (ref 3.2–4.9)
ALBUMIN/GLOB SERPL: 2.1 G/DL
ALP SERPL-CCNC: 90 U/L (ref 30–99)
ALT SERPL-CCNC: 18 U/L (ref 2–50)
ANION GAP SERPL CALC-SCNC: 16 MMOL/L (ref 7–16)
AST SERPL-CCNC: 26 U/L (ref 12–45)
BASOPHILS # BLD AUTO: 1.3 % (ref 0–1.8)
BASOPHILS # BLD: 0.11 K/UL (ref 0–0.12)
BILIRUB SERPL-MCNC: 0.5 MG/DL (ref 0.1–1.5)
BUN SERPL-MCNC: 10 MG/DL (ref 8–22)
CALCIUM ALBUM COR SERPL-MCNC: 9 MG/DL (ref 8.5–10.5)
CALCIUM SERPL-MCNC: 9.4 MG/DL (ref 8.5–10.5)
CHLORIDE SERPL-SCNC: 94 MMOL/L (ref 96–112)
CO2 SERPL-SCNC: 20 MMOL/L (ref 20–33)
CREAT SERPL-MCNC: 0.94 MG/DL (ref 0.5–1.4)
EOSINOPHIL # BLD AUTO: 0.15 K/UL (ref 0–0.51)
EOSINOPHIL NFR BLD: 1.8 % (ref 0–6.9)
ERYTHROCYTE [DISTWIDTH] IN BLOOD BY AUTOMATED COUNT: 41.2 FL (ref 35.9–50)
GFR SERPLBLD CREATININE-BSD FMLA CKD-EPI: 104 ML/MIN/1.73 M 2
GLOBULIN SER CALC-MCNC: 2.1 G/DL (ref 1.9–3.5)
GLUCOSE BLD STRIP.AUTO-MCNC: 383 MG/DL (ref 65–99)
GLUCOSE BLD STRIP.AUTO-MCNC: 413 MG/DL (ref 65–99)
GLUCOSE BLD STRIP.AUTO-MCNC: 427 MG/DL (ref 65–99)
GLUCOSE SERPL-MCNC: 489 MG/DL (ref 65–99)
HCT VFR BLD AUTO: 38.2 % (ref 42–52)
HGB BLD-MCNC: 13.2 G/DL (ref 14–18)
IMM GRANULOCYTES # BLD AUTO: 0.08 K/UL (ref 0–0.11)
IMM GRANULOCYTES NFR BLD AUTO: 0.9 % (ref 0–0.9)
LYMPHOCYTES # BLD AUTO: 2.31 K/UL (ref 1–4.8)
LYMPHOCYTES NFR BLD: 27.2 % (ref 22–41)
MCH RBC QN AUTO: 30.8 PG (ref 27–33)
MCHC RBC AUTO-ENTMCNC: 34.6 G/DL (ref 32.3–36.5)
MCV RBC AUTO: 89.3 FL (ref 81.4–97.8)
MONOCYTES # BLD AUTO: 0.63 K/UL (ref 0–0.85)
MONOCYTES NFR BLD AUTO: 7.4 % (ref 0–13.4)
NEUTROPHILS # BLD AUTO: 5.2 K/UL (ref 1.82–7.42)
NEUTROPHILS NFR BLD: 61.4 % (ref 44–72)
NRBC # BLD AUTO: 0 K/UL
NRBC BLD-RTO: 0 /100 WBC (ref 0–0.2)
PLATELET # BLD AUTO: 345 K/UL (ref 164–446)
PMV BLD AUTO: 10.8 FL (ref 9–12.9)
POTASSIUM SERPL-SCNC: 3.8 MMOL/L (ref 3.6–5.5)
PROT SERPL-MCNC: 6.6 G/DL (ref 6–8.2)
RBC # BLD AUTO: 4.28 M/UL (ref 4.7–6.1)
SODIUM SERPL-SCNC: 130 MMOL/L (ref 135–145)
WBC # BLD AUTO: 8.5 K/UL (ref 4.8–10.8)

## 2023-07-13 PROCEDURE — 82962 GLUCOSE BLOOD TEST: CPT

## 2023-07-13 PROCEDURE — 36415 COLL VENOUS BLD VENIPUNCTURE: CPT

## 2023-07-13 PROCEDURE — 96372 THER/PROPH/DIAG INJ SC/IM: CPT

## 2023-07-13 PROCEDURE — 80053 COMPREHEN METABOLIC PANEL: CPT

## 2023-07-13 PROCEDURE — 85025 COMPLETE CBC W/AUTO DIFF WBC: CPT

## 2023-07-13 PROCEDURE — 700102 HCHG RX REV CODE 250 W/ 637 OVERRIDE(OP): Mod: UD | Performed by: EMERGENCY MEDICINE

## 2023-07-13 PROCEDURE — 99284 EMERGENCY DEPT VISIT MOD MDM: CPT

## 2023-07-13 RX ADMIN — INSULIN HUMAN 10 UNITS: 100 INJECTION, SOLUTION PARENTERAL at 19:58

## 2023-07-13 ASSESSMENT — FIBROSIS 4 INDEX: FIB4 SCORE: 0.44

## 2023-07-14 NOTE — ED PROVIDER NOTES
ED Provider Note    CHIEF COMPLAINT  Chief Complaint   Patient presents with    High Blood Sugar       HPI/ROS    Norm Prabhakar is a 43 y.o. male who presents with hyperglycemia.  The patient states he was playing basketball with a friend and his friend has diabetes he checked his blood sugars has not received his insulin today.  It read high and therefore he presents for evaluation.  He has not had any vomiting.  He has been thirsty.  He has not had any diarrhea.  He does not have any pain at this time.  He presents in a asymptomatic state.    PAST MEDICAL HISTORY   has a past medical history of Anxiety, ASTHMA, Bipolar 1 disorder ([facility]), Depression, Diabetes ([facility]), Fall, Glaucoma, Glaucoma (1982), Hypothyroidism, Indigestion, Mental disorder, Murmur, Pneumonia, Psychiatric problem (2002), S/P thyroidectomy, Seizure ([facility]) (2010), Seizure disorder ([facility]), and Unspecified disorder of thyroid.    SURGICAL HISTORY   has a past surgical history that includes eye surgery; thyroid lobectomy; and other.    FAMILY HISTORY  Family History   Problem Relation Age of Onset    Hypertension Mother     Heart Disease Mother     Lung Disease Mother     Stroke Maternal Grandmother        SOCIAL HISTORY  Social History     Tobacco Use    Smoking status: Former     Packs/day: 0.25     Types: Cigarettes, Cigars     Quit date: 4/1/2020     Years since quitting: 3.2    Smokeless tobacco: Never    Tobacco comments:     3 cigarettes a day   Vaping Use    Vaping Use: Every day    Substances: Nicotine, THC, CBD, Flavoring    Devices: Disposable   Substance and Sexual Activity    Alcohol use: Yes     Comment: occasionally    Drug use: Yes     Types: Inhaled     Comment: marijuana    Sexual activity: Not Currently       CURRENT MEDICATIONS  Home Medications       Reviewed by Milagro Ambrose R.N. (Registered Nurse) on 07/13/23 at 1733  Med List Status: Not Addressed     Medication Last Dose Status   Alcohol Swabs  Active   Blood Glucose  "Monitoring Suppl (BLOOD GLUCOSE SYSTEM SONDRA) Kit  Active   budesonide-formoterol (SYMBICORT) 160-4.5 MCG/ACT Aerosol  Active   glucose blood strip  Active   insulin glargine 100 UNIT/ML SC SOPN injection  Active   insulin lispro 100 UNIT/ML SC SOPN injection PEN  Active   Insulin Pen Needle 32 G x 4 mm  Active   Lancets  Active                    ALLERGIES  Allergies   Allergen Reactions    Abilify      \"Feeling tired, like I don't even know whats going on around me\"    Fish      Pt reports salmon causes him to be sick to his stomach  Not listed on MAR noted 2/3/2021      Geodon [Ziprasidone Hcl] Anaphylaxis     Anaphylaxis per patient    Aripiprazole      \"I became lethargic.\"    Hydroxyzine Itching     Pt will only state \"I feel itchy when I take it    Ziprasidone      Results for orders placed or performed during the hospital encounter of 07/13/23   CBC with Differential   Result Value Ref Range    WBC 8.5 4.8 - 10.8 K/uL    RBC 4.28 (L) 4.70 - 6.10 M/uL    Hemoglobin 13.2 (L) 14.0 - 18.0 g/dL    Hematocrit 38.2 (L) 42.0 - 52.0 %    MCV 89.3 81.4 - 97.8 fL    MCH 30.8 27.0 - 33.0 pg    MCHC 34.6 32.3 - 36.5 g/dL    RDW 41.2 35.9 - 50.0 fL    Platelet Count 345 164 - 446 K/uL    MPV 10.8 9.0 - 12.9 fL    Neutrophils-Polys 61.40 44.00 - 72.00 %    Lymphocytes 27.20 22.00 - 41.00 %    Monocytes 7.40 0.00 - 13.40 %    Eosinophils 1.80 0.00 - 6.90 %    Basophils 1.30 0.00 - 1.80 %    Immature Granulocytes 0.90 0.00 - 0.90 %    Nucleated RBC 0.00 0.00 - 0.20 /100 WBC    Neutrophils (Absolute) 5.20 1.82 - 7.42 K/uL    Lymphs (Absolute) 2.31 1.00 - 4.80 K/uL    Monos (Absolute) 0.63 0.00 - 0.85 K/uL    Eos (Absolute) 0.15 0.00 - 0.51 K/uL    Baso (Absolute) 0.11 0.00 - 0.12 K/uL    Immature Granulocytes (abs) 0.08 0.00 - 0.11 K/uL    NRBC (Absolute) 0.00 K/uL   Comp Metabolic Panel   Result Value Ref Range    Sodium 130 (L) 135 - 145 mmol/L    Potassium 3.8 3.6 - 5.5 mmol/L    Chloride 94 (L) 96 - 112 mmol/L    Co2 20 20 " "- 33 mmol/L    Anion Gap 16.0 7.0 - 16.0    Glucose 489 (H) 65 - 99 mg/dL    Bun 10 8 - 22 mg/dL    Creatinine 0.94 0.50 - 1.40 mg/dL    Calcium 9.4 8.5 - 10.5 mg/dL    AST(SGOT) 26 12 - 45 U/L    ALT(SGPT) 18 2 - 50 U/L    Alkaline Phosphatase 90 30 - 99 U/L    Total Bilirubin 0.5 0.1 - 1.5 mg/dL    Albumin 4.5 3.2 - 4.9 g/dL    Total Protein 6.6 6.0 - 8.2 g/dL    Globulin 2.1 1.9 - 3.5 g/dL    A-G Ratio 2.1 g/dL   CORRECTED CALCIUM   Result Value Ref Range    Correct Calcium 9.0 8.5 - 10.5 mg/dL   ESTIMATED GFR   Result Value Ref Range    GFR (CKD-EPI) 104 >60 mL/min/1.73 m 2   POCT glucose device results   Result Value Ref Range    POC Glucose, Blood 413 (HH) 65 - 99 mg/dL   POCT glucose device results   Result Value Ref Range    POC Glucose, Blood 427 (HH) 65 - 99 mg/dL   POCT glucose device results   Result Value Ref Range    POC Glucose, Blood 383 (H) 65 - 99 mg/dL         PHYSICAL EXAM  VITAL SIGNS: /72   Pulse 66   Temp 36.4 °C (97.6 °F) (Temporal)   Resp 18   Ht 1.981 m (6' 6\")   Wt 110 kg (242 lb)   SpO2 99%   BMI 27.97 kg/m²    In general the patient is pleasant does not appear any acute distress    ENT is within normal limits with moist mucous membranes    Pulmonary chest is clear to auscultation bilaterally    Vascular S1-S2 with a regular rate and rhythm    GI abdomen soft    Neurologic examination is grossly intact      COURSE & MEDICAL DECISION MAKING  This a 43-year-old gentleman who presents the emergency department with poorly controlled diabetes.  I suspect this is from his lack of insulin today.  He did receive 10 units of regular insulin and has been tolerating oral fluids.  Accu-Chek at around 9 PM shows continued decrease in his blood sugar.  He states that he has his insulin at home as well as his glucometer.  Therefore the patient will be discharged with instructions to maintain good blood sugar control with his diabetic insulin regimen.  Laboratory analysis is reassuring as " the patient does not have any significant acidosis nor metabolic derangements.    FINAL DIAGNOSIS  Hyperglycemia    Disposition  The patient will be discharged in stable condition       Electronically signed by: Josh Randhawa M.D., 7/13/2023 7:50 PM

## 2023-07-14 NOTE — ED TRIAGE NOTES
Chief Complaint   Patient presents with    High Blood Sugar     Pt ambulated to triage c/I high blood sugar >500. Rechecked fsbs 413, pt said that he has not taken his insulin this evening.   Denies of any other symptoms.

## 2023-07-14 NOTE — ED NOTES
Pt ambulated to G26 from lobby w/ steady gait. On monitor, call light in reach. Chart up for ERP.

## 2023-07-17 ENCOUNTER — HOSPITAL ENCOUNTER (EMERGENCY)
Facility: MEDICAL CENTER | Age: 43
End: 2023-07-17
Attending: EMERGENCY MEDICINE
Payer: MEDICAID

## 2023-07-17 VITALS
BODY MASS INDEX: 28.42 KG/M2 | WEIGHT: 245.59 LBS | SYSTOLIC BLOOD PRESSURE: 138 MMHG | HEIGHT: 78 IN | HEART RATE: 56 BPM | DIASTOLIC BLOOD PRESSURE: 89 MMHG | TEMPERATURE: 97.7 F | OXYGEN SATURATION: 97 % | RESPIRATION RATE: 16 BRPM

## 2023-07-17 DIAGNOSIS — R73.9 HYPERGLYCEMIA: ICD-10-CM

## 2023-07-17 DIAGNOSIS — E11.9 TYPE 2 DIABETES MELLITUS WITHOUT COMPLICATION, WITHOUT LONG-TERM CURRENT USE OF INSULIN (HCC): ICD-10-CM

## 2023-07-17 DIAGNOSIS — E13.10 DIABETIC KETOSIS (HCC): ICD-10-CM

## 2023-07-17 DIAGNOSIS — G40.909 NONINTRACTABLE EPILEPSY WITHOUT STATUS EPILEPTICUS, UNSPECIFIED EPILEPSY TYPE (HCC): ICD-10-CM

## 2023-07-17 LAB
ALBUMIN SERPL BCP-MCNC: 4.5 G/DL (ref 3.2–4.9)
ALBUMIN/GLOB SERPL: 1.9 G/DL
ALP SERPL-CCNC: 107 U/L (ref 30–99)
ALT SERPL-CCNC: 19 U/L (ref 2–50)
ANION GAP SERPL CALC-SCNC: 16 MMOL/L (ref 7–16)
AST SERPL-CCNC: 22 U/L (ref 12–45)
B-OH-BUTYR SERPL-MCNC: 0.5 MMOL/L (ref 0.02–0.27)
BASOPHILS # BLD AUTO: 1.1 % (ref 0–1.8)
BASOPHILS # BLD: 0.11 K/UL (ref 0–0.12)
BILIRUB SERPL-MCNC: 0.6 MG/DL (ref 0.1–1.5)
BUN SERPL-MCNC: 9 MG/DL (ref 8–22)
CALCIUM ALBUM COR SERPL-MCNC: 9.3 MG/DL (ref 8.5–10.5)
CALCIUM SERPL-MCNC: 9.7 MG/DL (ref 8.4–10.2)
CHLORIDE SERPL-SCNC: 95 MMOL/L (ref 96–112)
CO2 SERPL-SCNC: 22 MMOL/L (ref 20–33)
CREAT SERPL-MCNC: 0.95 MG/DL (ref 0.5–1.4)
EKG IMPRESSION: NORMAL
EOSINOPHIL # BLD AUTO: 0.08 K/UL (ref 0–0.51)
EOSINOPHIL NFR BLD: 0.8 % (ref 0–6.9)
ERYTHROCYTE [DISTWIDTH] IN BLOOD BY AUTOMATED COUNT: 41.5 FL (ref 35.9–50)
GFR SERPLBLD CREATININE-BSD FMLA CKD-EPI: 103 ML/MIN/1.73 M 2
GLOBULIN SER CALC-MCNC: 2.4 G/DL (ref 1.9–3.5)
GLUCOSE BLD STRIP.AUTO-MCNC: 274 MG/DL (ref 65–99)
GLUCOSE BLD STRIP.AUTO-MCNC: 332 MG/DL (ref 65–99)
GLUCOSE SERPL-MCNC: 349 MG/DL (ref 65–99)
HCT VFR BLD AUTO: 38.9 % (ref 42–52)
HGB BLD-MCNC: 13.3 G/DL (ref 14–18)
IMM GRANULOCYTES # BLD AUTO: 0.07 K/UL (ref 0–0.11)
IMM GRANULOCYTES NFR BLD AUTO: 0.7 % (ref 0–0.9)
LYMPHOCYTES # BLD AUTO: 3.3 K/UL (ref 1–4.8)
LYMPHOCYTES NFR BLD: 32 % (ref 22–41)
MCH RBC QN AUTO: 30.9 PG (ref 27–33)
MCHC RBC AUTO-ENTMCNC: 34.2 G/DL (ref 32.3–36.5)
MCV RBC AUTO: 90.3 FL (ref 81.4–97.8)
MONOCYTES # BLD AUTO: 0.61 K/UL (ref 0–0.85)
MONOCYTES NFR BLD AUTO: 5.9 % (ref 0–13.4)
NEUTROPHILS # BLD AUTO: 6.15 K/UL (ref 1.82–7.42)
NEUTROPHILS NFR BLD: 59.5 % (ref 44–72)
NRBC # BLD AUTO: 0 K/UL
NRBC BLD-RTO: 0 /100 WBC (ref 0–0.2)
PLATELET # BLD AUTO: 346 K/UL (ref 164–446)
PMV BLD AUTO: 10.5 FL (ref 9–12.9)
POTASSIUM SERPL-SCNC: 3.7 MMOL/L (ref 3.6–5.5)
PROT SERPL-MCNC: 6.9 G/DL (ref 6–8.2)
RBC # BLD AUTO: 4.31 M/UL (ref 4.7–6.1)
SODIUM SERPL-SCNC: 133 MMOL/L (ref 135–145)
WBC # BLD AUTO: 10.3 K/UL (ref 4.8–10.8)

## 2023-07-17 PROCEDURE — 99285 EMERGENCY DEPT VISIT HI MDM: CPT

## 2023-07-17 PROCEDURE — 700102 HCHG RX REV CODE 250 W/ 637 OVERRIDE(OP): Performed by: EMERGENCY MEDICINE

## 2023-07-17 PROCEDURE — 700105 HCHG RX REV CODE 258: Performed by: EMERGENCY MEDICINE

## 2023-07-17 PROCEDURE — 82010 KETONE BODYS QUAN: CPT

## 2023-07-17 PROCEDURE — A9270 NON-COVERED ITEM OR SERVICE: HCPCS | Performed by: EMERGENCY MEDICINE

## 2023-07-17 PROCEDURE — 82962 GLUCOSE BLOOD TEST: CPT

## 2023-07-17 PROCEDURE — 36415 COLL VENOUS BLD VENIPUNCTURE: CPT

## 2023-07-17 PROCEDURE — 96374 THER/PROPH/DIAG INJ IV PUSH: CPT

## 2023-07-17 PROCEDURE — 93005 ELECTROCARDIOGRAM TRACING: CPT | Performed by: EMERGENCY MEDICINE

## 2023-07-17 PROCEDURE — 85025 COMPLETE CBC W/AUTO DIFF WBC: CPT

## 2023-07-17 PROCEDURE — 80053 COMPREHEN METABOLIC PANEL: CPT

## 2023-07-17 RX ORDER — INSULIN LISPRO 100 [IU]/ML
2-12 INJECTION, SOLUTION INTRAVENOUS; SUBCUTANEOUS
Qty: 15 ML | Refills: 0 | Status: ON HOLD | OUTPATIENT
Start: 2023-07-17 | End: 2023-08-17

## 2023-07-17 RX ORDER — DIVALPROEX SODIUM 500 MG/1
1500 TABLET, DELAYED RELEASE ORAL
Status: SHIPPED | COMMUNITY
End: 2023-08-13

## 2023-07-17 RX ORDER — DIVALPROEX SODIUM 250 MG/1
500 TABLET, DELAYED RELEASE ORAL EVERY MORNING
Qty: 30 TABLET | Refills: 0 | Status: SHIPPED | OUTPATIENT
Start: 2023-07-17 | End: 2023-08-13

## 2023-07-17 RX ORDER — DIVALPROEX SODIUM 500 MG/1
500-1500 TABLET, DELAYED RELEASE ORAL 2 TIMES DAILY
COMMUNITY

## 2023-07-17 RX ORDER — LEVOTHYROXINE SODIUM 0.2 MG/1
200 TABLET ORAL
COMMUNITY

## 2023-07-17 RX ORDER — SODIUM CHLORIDE 9 MG/ML
1000 INJECTION, SOLUTION INTRAVENOUS ONCE
Status: COMPLETED | OUTPATIENT
Start: 2023-07-17 | End: 2023-07-17

## 2023-07-17 RX ORDER — DIVALPROEX SODIUM 250 MG/1
1500 TABLET, DELAYED RELEASE ORAL NIGHTLY
Qty: 90 TABLET | Refills: 0 | Status: SHIPPED | OUTPATIENT
Start: 2023-07-17 | End: 2023-08-13

## 2023-07-17 RX ADMIN — SODIUM CHLORIDE 1000 ML: 9 INJECTION, SOLUTION INTRAVENOUS at 20:10

## 2023-07-17 RX ADMIN — IBUPROFEN 800 MG: 200 TABLET, FILM COATED ORAL at 22:31

## 2023-07-17 RX ADMIN — INSULIN HUMAN 5 UNITS: 100 INJECTION, SOLUTION PARENTERAL at 21:22

## 2023-07-17 ASSESSMENT — FIBROSIS 4 INDEX: FIB4 SCORE: 0.73

## 2023-07-18 NOTE — ED NOTES
Pt discharged with instructions and script.  Pt verbalized understanding of discharge instructions.  Pt ambulated out of ED to lobby  calling MTM for ride

## 2023-07-18 NOTE — ED TRIAGE NOTES
Pt comes in c/o being lethargic   woke up this morning not feel well   was fine yesterday   per pt walked here from his home for PT  got lost and is now here for said complaint of weakness sluggish and very tired

## 2023-07-18 NOTE — ED PROVIDER NOTES
ER Provider Note    Scribed for Augustus Carroll D.O. by Jaquan Momin. 7/17/2023  7:23 PM    Primary Care Provider: Pcp Pt States None    CHIEF COMPLAINT  Chief Complaint   Patient presents with    Weakness     Started this morning when pt woke up not feeling well   feeling very   Lethargic all day long   walk here from home for PT  got lost and started feeling bad   here for evaluation      Numbness     Has been having numbness on and off all day to R arm and leg  has had issues like this 5/2023         HPI/ROS  LIMITATION TO HISTORY   None.  OUTSIDE HISTORIAN(S):  None.    Norm Prabhakar is a 43 y.o. male with a history of diabetes who presents to the Emergency Department for evaluation of intermittent right sided numbness onset this morning. Patient has a history of similar symptoms, and has been seen for these symptoms. He reports that today he decided to come in  as these symptoms seemed to be worse than in previous flair ups. Patient reports that he is feeling weak and lethargic, and describes right sided weakness in the arms and legs. He reports associated nausea and dizziness but denies any vomiting or diarrhea. Patient has a history of stroke with associated right sided deficits. He mentions having immediate family with history of strokes and aneurisms. Norm checks his sugars regularly, and reports that they have not been very abnormal for him recently.     ROS as per HPI.    PAST MEDICAL HISTORY  Past Medical History:   Diagnosis Date    Anxiety     BIPOLAR    ASTHMA     Bipolar 1 disorder (HCC)     Depression     Diabetes (HCC)     Type II Diabetes    Fall     passed out 2 wks ago    Glaucoma     Glaucoma 1982    both eyes/ blind on left eye    Hypothyroidism     Indigestion     once in a while    Mental disorder     learning disabilities; speech impairment; developmental delays    Murmur     since birth    Pneumonia     remote    Psychiatric problem 2002    PTSD    S/P thyroidectomy     Seizure  ([facility]) 2010    Seizure disorder ([facility])     Unspecified disorder of thyroid        SURGICAL HISTORY  Past Surgical History:   Procedure Laterality Date    EYE SURGERY      OTHER      Hernia Repair when he was 10 yrs old    THYROID LOBECTOMY         FAMILY HISTORY  Family History   Problem Relation Age of Onset    Hypertension Mother     Heart Disease Mother     Lung Disease Mother     Stroke Maternal Grandmother        SOCIAL HISTORY   reports that he quit smoking about 3 years ago. His smoking use included cigarettes and cigars. He smoked an average of .25 packs per day. He has never used smokeless tobacco. He reports current alcohol use. He reports current drug use. Drug: Inhaled.    CURRENT MEDICATIONS  Previous Medications    ALCOHOL SWABS    Wipe site with prep pad prior to injection.    BLOOD GLUCOSE MONITORING SUPPL (BLOOD GLUCOSE SYSTEM SONDRA) KIT    Test blood sugar as recommended by provider.    BUDESONIDE-FORMOTEROL (SYMBICORT) 160-4.5 MCG/ACT AEROSOL    Inhale 2 Puffs 2 times a day.    DIVALPROEX (DEPAKOTE) 500 MG TABLET DELAYED RESPONSE    Take 500 mg by mouth every morning.    DIVALPROEX (DEPAKOTE) 500 MG TABLET DELAYED RESPONSE    Take 1,500 mg by mouth at bedtime.    GLUCOSE BLOOD STRIP    Use one strip to test blood sugar three times daily before meals.    INSULIN GLARGINE 100 UNIT/ML SC SOPN INJECTION    Inject 18 Units under the skin every day for 30 days.    INSULIN LISPRO 100 UNIT/ML SC SOPN INJECTION PEN    Inject 2-12 Units under the skin 4 Times a Day,Before Meals and at Bedtime for 30 days.    INSULIN PEN NEEDLE 32 G X 4 MM    Use one pen needle in pen device to inject insulin four times daily.    LANCETS    Use one lancet to test blood sugar three times daily before meals.    LEVOTHYROXINE (SYNTHROID) 200 MCG TAB    Take 225 mcg by mouth every morning on an empty stomach.       ALLERGIES  Abilify, Fish, Geodon [ziprasidone hcl], Aripiprazole, Hydroxyzine, and Ziprasidone    PHYSICAL EXAM  BP  "115/81   Pulse 80   Temp 36.8 °C (98.3 °F) (Temporal)   Resp 16   Ht 1.981 m (6' 6\")   Wt 111 kg (245 lb 9.5 oz)   SpO2 95%   BMI 28.38 kg/m²     General: No acute distress.  HENT: Normocephalic, Mucus membranes are moist.   Chest: Lungs have even and unlabored respirations, Clear to auscultation.   Cardiovascular: Regular rate and regular rhythm, No peripheral cyanosis.  Abdomen: Non distended.  Neuro: Awake, Conversive, Able to relay recent events.Right sided weakness including facial.   Psychiatric: Calm and cooperative.     EXTERNAL RECORDS REVIEWED  Visits here for similar complains.     INITIAL ASSESSMENT  Patient is a type 2 diabetic with history of stroke complaining of general fatigue and intermittent weakness with it greater in the right side. Does not meet concerns for acute CVA. Will evaluate with labs to evaluate for electrolyte imbalance, anemia, and blood sugar problems.     ED Observation Status? Yes; I am placing the patient in to an observation status due to a diagnostic uncertainty as well as therapeutic intensity. Patient placed in observation status at 7:33 PM, 7/17/2023.     Observation plan is as follows: Evaluate neurologic status as results are prepared.       DIAGNOSTIC STUDIES    Labs:   Results for orders placed or performed during the hospital encounter of 07/17/23   CBC WITH DIFFERENTIAL   Result Value Ref Range    WBC 10.3 4.8 - 10.8 K/uL    RBC 4.31 (L) 4.70 - 6.10 M/uL    Hemoglobin 13.3 (L) 14.0 - 18.0 g/dL    Hematocrit 38.9 (L) 42.0 - 52.0 %    MCV 90.3 81.4 - 97.8 fL    MCH 30.9 27.0 - 33.0 pg    MCHC 34.2 32.3 - 36.5 g/dL    RDW 41.5 35.9 - 50.0 fL    Platelet Count 346 164 - 446 K/uL    MPV 10.5 9.0 - 12.9 fL    Neutrophils-Polys 59.50 44.00 - 72.00 %    Lymphocytes 32.00 22.00 - 41.00 %    Monocytes 5.90 0.00 - 13.40 %    Eosinophils 0.80 0.00 - 6.90 %    Basophils 1.10 0.00 - 1.80 %    Immature Granulocytes 0.70 0.00 - 0.90 %    Nucleated RBC 0.00 0.00 - 0.20 /100 WBC "    Neutrophils (Absolute) 6.15 1.82 - 7.42 K/uL    Lymphs (Absolute) 3.30 1.00 - 4.80 K/uL    Monos (Absolute) 0.61 0.00 - 0.85 K/uL    Eos (Absolute) 0.08 0.00 - 0.51 K/uL    Baso (Absolute) 0.11 0.00 - 0.12 K/uL    Immature Granulocytes (abs) 0.07 0.00 - 0.11 K/uL    NRBC (Absolute) 0.00 K/uL   COMP METABOLIC PANEL   Result Value Ref Range    Sodium 133 (L) 135 - 145 mmol/L    Potassium 3.7 3.6 - 5.5 mmol/L    Chloride 95 (L) 96 - 112 mmol/L    Co2 22 20 - 33 mmol/L    Anion Gap 16.0 7.0 - 16.0    Glucose 349 (H) 65 - 99 mg/dL    Bun 9 8 - 22 mg/dL    Creatinine 0.95 0.50 - 1.40 mg/dL    Calcium 9.7 8.4 - 10.2 mg/dL    AST(SGOT) 22 12 - 45 U/L    ALT(SGPT) 19 2 - 50 U/L    Alkaline Phosphatase 107 (H) 30 - 99 U/L    Total Bilirubin 0.6 0.1 - 1.5 mg/dL    Albumin 4.5 3.2 - 4.9 g/dL    Total Protein 6.9 6.0 - 8.2 g/dL    Globulin 2.4 1.9 - 3.5 g/dL    A-G Ratio 1.9 g/dL   BETA-HYDROXYBUTYRIC ACID   Result Value Ref Range    beta-Hydroxybutyric Acid 0.50 (H) 0.02 - 0.27 mmol/L   CORRECTED CALCIUM   Result Value Ref Range    Correct Calcium 9.3 8.5 - 10.5 mg/dL   ESTIMATED GFR   Result Value Ref Range    GFR (CKD-EPI) 103 >60 mL/min/1.73 m 2   EKG   Result Value Ref Range    Report       [facility] Emergency Dept.    Test Date:  2023  Pt Name:    HOLLY KIM                 Department: [facility]  MRN:        [id number]                      [facility]  Gender:     Male                         Technician: ross  :        1980                   Requested By:VIKY FONSECA  Order #:    [id number]                    Philippe MD:    Measurements  Intervals                                Axis  Rate:       65                           P:          59  ID:         [id number]                          QRS:        24  QRSD:       123                          T:          45  QT:         403  QTc:        419    Interpretive Statements  Sinus rhythm  IVCD, consider atypical LBBB  Compared to ECG  07/09/2023 20:01:44  No significant changes     POCT glucose device results   Result Value Ref Range    POC Glucose, Blood 332 (H) 65 - 99 mg/dL        EKG:   I have independently interpreted the above EKG.    COURSE & MEDICAL DECISION MAKING     COURSE AND PLAN  7:23 PM - Patient seen and examined at bedside. Patient is a 43 year old male who presents today for evaluation of right sided general weakness and numbness. He has a history of these symptoms, however they were worsening today, prompting him to present to the ED. He has an additional history of type two diabetes, see HPI for further details.  Discussed plan of care, including obtaining blood work for evaluation of blood sugar levels. Patient agrees to the plan of care. The patient will be medicated with NS infusion 1000 mL. Ordered for EKG, CMP, CBC with differential, and Beta-Hydroxybutyric acid to evaluate his symptoms.     ED Summary: Patient presents with not feeling well.  He has been noncompliant with his medication because he is running low.  His blood sugar was elevated.  His beta hydroxybutyrate was 0.5.  This is minimally elevated consistent with ketosis.  There is no signs of acidosis.  He was given IV fluid 2 L, and first dose of IV insulin here x1.  The fingerstick blood sugars will be rechecked.  The patient will be stable for discharge home as his blood sugars start to come down.  He is out of his medications of Depakote and a prescription has been written.    DISPOSITION AND DISCUSSIONS    ED observation, endorsed to Dr. Villar    FINAL DIAGNOSIS  1. Hyperglycemia    2. Diabetic ketosis (HCC)    3. Nonintractable epilepsy without status epilepticus, unspecified epilepsy type (HCC)         Jaquan ROB (Scribe), am scribing for, and in the presence of, Augustus Carroll D.O..    Electronically signed by: Jaquan Hawley), 7/17/2023    Augustus ROB D.O. personally performed the services described in this documentation,  as scribed by Jaquan Momin in my presence, and it is both accurate and complete.    The note accurately reflects work and decisions made by me.  Augustus Carroll D.O.  7/17/2023  9:04 PM

## 2023-07-28 ENCOUNTER — HOSPITAL ENCOUNTER (EMERGENCY)
Facility: MEDICAL CENTER | Age: 43
End: 2023-07-28
Attending: STUDENT IN AN ORGANIZED HEALTH CARE EDUCATION/TRAINING PROGRAM
Payer: MEDICAID

## 2023-07-28 ENCOUNTER — HOSPITAL ENCOUNTER (EMERGENCY)
Facility: MEDICAL CENTER | Age: 43
End: 2023-07-29
Attending: STUDENT IN AN ORGANIZED HEALTH CARE EDUCATION/TRAINING PROGRAM
Payer: MEDICAID

## 2023-07-28 VITALS
HEIGHT: 78 IN | OXYGEN SATURATION: 93 % | SYSTOLIC BLOOD PRESSURE: 125 MMHG | HEART RATE: 59 BPM | DIASTOLIC BLOOD PRESSURE: 73 MMHG | TEMPERATURE: 97.2 F | WEIGHT: 244.93 LBS | BODY MASS INDEX: 28.34 KG/M2 | RESPIRATION RATE: 17 BRPM

## 2023-07-28 DIAGNOSIS — G44.201 ACUTE INTRACTABLE TENSION-TYPE HEADACHE: ICD-10-CM

## 2023-07-28 DIAGNOSIS — E13.65 UNCONTROLLED OTHER SPECIFIED DIABETES MELLITUS WITH HYPERGLYCEMIA (HCC): ICD-10-CM

## 2023-07-28 DIAGNOSIS — R73.9 HYPERGLYCEMIA: ICD-10-CM

## 2023-07-28 DIAGNOSIS — R51.9 ACUTE NONINTRACTABLE HEADACHE, UNSPECIFIED HEADACHE TYPE: ICD-10-CM

## 2023-07-28 DIAGNOSIS — C76.0 NECK MALIGNANT NEOPLASM (HCC): ICD-10-CM

## 2023-07-28 LAB
ALBUMIN SERPL BCP-MCNC: 4.5 G/DL (ref 3.2–4.9)
ALBUMIN/GLOB SERPL: 1.8 G/DL
ALP SERPL-CCNC: 111 U/L (ref 30–99)
ALT SERPL-CCNC: 14 U/L (ref 2–50)
ANION GAP SERPL CALC-SCNC: 15 MMOL/L (ref 7–16)
AST SERPL-CCNC: 13 U/L (ref 12–45)
BASOPHILS # BLD AUTO: 1.4 % (ref 0–1.8)
BASOPHILS # BLD: 0.12 K/UL (ref 0–0.12)
BILIRUB SERPL-MCNC: 0.5 MG/DL (ref 0.1–1.5)
BUN SERPL-MCNC: 13 MG/DL (ref 8–22)
CALCIUM ALBUM COR SERPL-MCNC: 9.1 MG/DL (ref 8.5–10.5)
CALCIUM SERPL-MCNC: 9.5 MG/DL (ref 8.5–10.5)
CHLORIDE SERPL-SCNC: 95 MMOL/L (ref 96–112)
CO2 SERPL-SCNC: 22 MMOL/L (ref 20–33)
CREAT SERPL-MCNC: 0.97 MG/DL (ref 0.5–1.4)
EOSINOPHIL # BLD AUTO: 0.06 K/UL (ref 0–0.51)
EOSINOPHIL NFR BLD: 0.7 % (ref 0–6.9)
ERYTHROCYTE [DISTWIDTH] IN BLOOD BY AUTOMATED COUNT: 43.3 FL (ref 35.9–50)
GFR SERPLBLD CREATININE-BSD FMLA CKD-EPI: 100 ML/MIN/1.73 M 2
GLOBULIN SER CALC-MCNC: 2.5 G/DL (ref 1.9–3.5)
GLUCOSE SERPL-MCNC: 323 MG/DL (ref 65–99)
HCT VFR BLD AUTO: 42 % (ref 42–52)
HGB BLD-MCNC: 14 G/DL (ref 14–18)
IMM GRANULOCYTES # BLD AUTO: 0.04 K/UL (ref 0–0.11)
IMM GRANULOCYTES NFR BLD AUTO: 0.5 % (ref 0–0.9)
LIPASE SERPL-CCNC: 14 U/L (ref 11–82)
LYMPHOCYTES # BLD AUTO: 3.62 K/UL (ref 1–4.8)
LYMPHOCYTES NFR BLD: 42.1 % (ref 22–41)
MCH RBC QN AUTO: 30.4 PG (ref 27–33)
MCHC RBC AUTO-ENTMCNC: 33.3 G/DL (ref 32.3–36.5)
MCV RBC AUTO: 91.1 FL (ref 81.4–97.8)
MONOCYTES # BLD AUTO: 0.66 K/UL (ref 0–0.85)
MONOCYTES NFR BLD AUTO: 7.7 % (ref 0–13.4)
NEUTROPHILS # BLD AUTO: 4.1 K/UL (ref 1.82–7.42)
NEUTROPHILS NFR BLD: 47.6 % (ref 44–72)
NRBC # BLD AUTO: 0 K/UL
NRBC BLD-RTO: 0 /100 WBC (ref 0–0.2)
PLATELET # BLD AUTO: 333 K/UL (ref 164–446)
PMV BLD AUTO: 10.8 FL (ref 9–12.9)
POTASSIUM SERPL-SCNC: 3.9 MMOL/L (ref 3.6–5.5)
PROT SERPL-MCNC: 7 G/DL (ref 6–8.2)
RBC # BLD AUTO: 4.61 M/UL (ref 4.7–6.1)
SODIUM SERPL-SCNC: 132 MMOL/L (ref 135–145)
WBC # BLD AUTO: 8.6 K/UL (ref 4.8–10.8)

## 2023-07-28 PROCEDURE — 96374 THER/PROPH/DIAG INJ IV PUSH: CPT

## 2023-07-28 PROCEDURE — 96375 TX/PRO/DX INJ NEW DRUG ADDON: CPT

## 2023-07-28 PROCEDURE — 99284 EMERGENCY DEPT VISIT MOD MDM: CPT | Mod: 27

## 2023-07-28 PROCEDURE — 83690 ASSAY OF LIPASE: CPT

## 2023-07-28 PROCEDURE — 700105 HCHG RX REV CODE 258: Mod: UD | Performed by: STUDENT IN AN ORGANIZED HEALTH CARE EDUCATION/TRAINING PROGRAM

## 2023-07-28 PROCEDURE — 85025 COMPLETE CBC W/AUTO DIFF WBC: CPT

## 2023-07-28 PROCEDURE — 700111 HCHG RX REV CODE 636 W/ 250 OVERRIDE (IP): Mod: UD | Performed by: STUDENT IN AN ORGANIZED HEALTH CARE EDUCATION/TRAINING PROGRAM

## 2023-07-28 PROCEDURE — 36415 COLL VENOUS BLD VENIPUNCTURE: CPT

## 2023-07-28 PROCEDURE — 80053 COMPREHEN METABOLIC PANEL: CPT

## 2023-07-28 PROCEDURE — 99285 EMERGENCY DEPT VISIT HI MDM: CPT

## 2023-07-28 RX ORDER — SODIUM CHLORIDE 9 MG/ML
1000 INJECTION, SOLUTION INTRAVENOUS ONCE
Status: COMPLETED | OUTPATIENT
Start: 2023-07-28 | End: 2023-07-28

## 2023-07-28 RX ORDER — KETOROLAC TROMETHAMINE 30 MG/ML
15 INJECTION, SOLUTION INTRAMUSCULAR; INTRAVENOUS ONCE
Status: COMPLETED | OUTPATIENT
Start: 2023-07-28 | End: 2023-07-28

## 2023-07-28 RX ORDER — PROCHLORPERAZINE EDISYLATE 5 MG/ML
10 INJECTION INTRAMUSCULAR; INTRAVENOUS ONCE
Status: COMPLETED | OUTPATIENT
Start: 2023-07-28 | End: 2023-07-28

## 2023-07-28 RX ORDER — DIPHENHYDRAMINE HYDROCHLORIDE 50 MG/ML
25 INJECTION INTRAMUSCULAR; INTRAVENOUS ONCE
Status: COMPLETED | OUTPATIENT
Start: 2023-07-28 | End: 2023-07-28

## 2023-07-28 RX ORDER — DIPHENHYDRAMINE HYDROCHLORIDE 50 MG/ML
12.5 INJECTION INTRAMUSCULAR; INTRAVENOUS ONCE
Status: COMPLETED | OUTPATIENT
Start: 2023-07-28 | End: 2023-07-28

## 2023-07-28 RX ADMIN — PROCHLORPERAZINE EDISYLATE 10 MG: 5 INJECTION INTRAMUSCULAR; INTRAVENOUS at 03:50

## 2023-07-28 RX ADMIN — DIPHENHYDRAMINE HYDROCHLORIDE 12.5 MG: 50 INJECTION, SOLUTION INTRAMUSCULAR; INTRAVENOUS at 22:31

## 2023-07-28 RX ADMIN — SODIUM CHLORIDE 1000 ML: 9 INJECTION, SOLUTION INTRAVENOUS at 03:50

## 2023-07-28 RX ADMIN — KETOROLAC TROMETHAMINE 15 MG: 30 INJECTION, SOLUTION INTRAMUSCULAR; INTRAVENOUS at 03:50

## 2023-07-28 RX ADMIN — PROCHLORPERAZINE EDISYLATE 10 MG: 5 INJECTION INTRAMUSCULAR; INTRAVENOUS at 22:31

## 2023-07-28 RX ADMIN — SODIUM CHLORIDE 1000 ML: 9 INJECTION, SOLUTION INTRAVENOUS at 22:32

## 2023-07-28 RX ADMIN — DIPHENHYDRAMINE HYDROCHLORIDE 25 MG: 50 INJECTION, SOLUTION INTRAMUSCULAR; INTRAVENOUS at 03:50

## 2023-07-28 ASSESSMENT — FIBROSIS 4 INDEX
FIB4 SCORE: 0.66
FIB4 SCORE: 0.43

## 2023-07-28 ASSESSMENT — PAIN DESCRIPTION - PAIN TYPE
TYPE: ACUTE PAIN
TYPE: ACUTE PAIN

## 2023-07-28 NOTE — DISCHARGE INSTRUCTIONS
Please return immediately to the ER if you have persistent or worsening headache, neck stiffness, fever, any changes in your mental status or behavior, persistent vomiting or you have any other new or acute concerns.  Please follow-up closely with your doctor regarding your ER presentation.  Of note your blood sugar was also high today it is important that you take your medications as prescribed.

## 2023-07-28 NOTE — ED PROVIDER NOTES
ED Provider Note    CHIEF COMPLAINT  Chief Complaint   Patient presents with    Headache     X 4 days, pt has tried multiple OTC medications with no relief. Reports associated nausea and weakness.        EXTERNAL RECORDS REVIEWED  Other patient seen here in the ER on 17 July for hyperglycemia and diabetic ketosis.  He is noncompliant with his insulin.  He has multiple prior ER visits for hyperglycemia.    HPI/ROS  LIMITATION TO HISTORY   Select: : None  OUTSIDE HISTORIAN(S):  None    Norm Prabhakar is a 43 y.o. male who presents with headache.  He states symptoms started 4 days ago.  The headache was not sudden in onset or worst headache of his life.  He says it has been gradually worsening.  It describes it as frontal  'as if I was hit in the head', with associated nausea but no vomiting.  Has tried multiple over-the-counter medications including Excedrin without relief.  He denies any fevers or chills.  He denies any falls or injuries. He feels generally weaker. He denies any headaches that are worse in the morning.  He also endorses associated photophobia and phonophobia.  He does endorse a history of prior headaches. He has occasional neck discomfort.     PAST MEDICAL HISTORY   has a past medical history of Anxiety, ASTHMA, Bipolar 1 disorder ([facility]), Depression, Diabetes ([facility]), Fall, Glaucoma, Glaucoma (1982), Hypothyroidism, Indigestion, Mental disorder, Murmur, Pneumonia, Psychiatric problem (2002), S/P thyroidectomy, Seizure ([facility]) (2010), Seizure disorder ([facility]), and Unspecified disorder of thyroid.    SURGICAL HISTORY   has a past surgical history that includes eye surgery; thyroid lobectomy; and other.    FAMILY HISTORY  Family History   Problem Relation Age of Onset    Hypertension Mother     Heart Disease Mother     Lung Disease Mother     Stroke Maternal Grandmother        SOCIAL HISTORY  Social History     Tobacco Use    Smoking status: Former     Packs/day: 0.25     Types: Cigarettes, Cigars     Quit  "date: 4/1/2020     Years since quitting: 3.3    Smokeless tobacco: Never    Tobacco comments:     3 cigarettes a day   Vaping Use    Vaping Use: Every day    Substances: Nicotine, THC, CBD, Flavoring    Devices: Disposable   Substance and Sexual Activity    Alcohol use: Yes     Comment: occasionally    Drug use: Yes     Types: Inhaled     Comment: marijuana    Sexual activity: Not Currently       CURRENT MEDICATIONS  Home Medications       Reviewed by Krystin Clark R.N. (Registered Nurse) on 07/28/23 at 0101  Med List Status: Not Addressed     Medication Last Dose Status   Alcohol Swabs  Active   Blood Glucose Monitoring Suppl (BLOOD GLUCOSE SYSTEM SONDRA) Kit  Active   budesonide-formoterol (SYMBICORT) 160-4.5 MCG/ACT Aerosol  Active   divalproex (DEPAKOTE) 250 MG Tablet Delayed Response  Active   divalproex (DEPAKOTE) 250 MG Tablet Delayed Response  Active   divalproex (DEPAKOTE) 500 MG Tablet Delayed Response  Active   divalproex (DEPAKOTE) 500 MG Tablet Delayed Response  Active   glucose blood strip  Active   insulin glargine 100 UNIT/ML SC SOPN injection  Active   insulin lispro 100 UNIT/ML SC SOPN injection PEN  Active   Insulin Pen Needle 32 G x 4 mm  Active   Lancets  Active   levothyroxine (SYNTHROID) 200 MCG Tab  Active                    ALLERGIES  Allergies   Allergen Reactions    Abilify      \"Feeling tired, like I don't even know whats going on around me\"    Fish      Pt reports salmon causes him to be sick to his stomach  Not listed on MAR noted 2/3/2021      Geodon [Ziprasidone Hcl] Anaphylaxis     Anaphylaxis per patient    Aripiprazole      \"I became lethargic.\"    Hydroxyzine Itching     Pt will only state \"I feel itchy when I take it    Ziprasidone        PHYSICAL EXAM  VITAL SIGNS: /73   Pulse (!) 59   Temp 36.2 °C (97.2 °F) (Temporal)   Resp 17   Ht 1.981 m (6' 6\")   Wt 111 kg (244 lb 14.9 oz)   SpO2 93%   BMI 28.30 kg/m²    Constitutional: Awake and alert . Non toxic  HENT: Normal " inspection  . Dry mucous membranes  Eyes: Normal inspection  Neck: Normal range of motion. No meningismus  Cardiovascular: Normal heart rate, Normal rhythm.  Symmetric peripheral pulses.   Thorax & Lungs: No respiratory distress, No wheezing, No rales, No rhonchi  Abdomen: Soft, non-distended, nontender to palpation in all 4 quadrants, no mass  Skin: No obvious rash.  Extremities: Warm, well perfused. No clubbing, cyanosis, edema   Neurologic: A&O x 4, No focal deficit. 5/5 strength to bilateral upper and lower extremities.  Normal sensation to light touch in bilateral upper and lower extremities  Psychiatric: Normal for situation      DIAGNOSTIC STUDIES / PROCEDURES    LABS  Results for orders placed or performed during the hospital encounter of 07/28/23   CBC WITH DIFFERENTIAL   Result Value Ref Range    WBC 8.6 4.8 - 10.8 K/uL    RBC 4.61 (L) 4.70 - 6.10 M/uL    Hemoglobin 14.0 14.0 - 18.0 g/dL    Hematocrit 42.0 42.0 - 52.0 %    MCV 91.1 81.4 - 97.8 fL    MCH 30.4 27.0 - 33.0 pg    MCHC 33.3 32.3 - 36.5 g/dL    RDW 43.3 35.9 - 50.0 fL    Platelet Count 333 164 - 446 K/uL    MPV 10.8 9.0 - 12.9 fL    Neutrophils-Polys 47.60 44.00 - 72.00 %    Lymphocytes 42.10 (H) 22.00 - 41.00 %    Monocytes 7.70 0.00 - 13.40 %    Eosinophils 0.70 0.00 - 6.90 %    Basophils 1.40 0.00 - 1.80 %    Immature Granulocytes 0.50 0.00 - 0.90 %    Nucleated RBC 0.00 0.00 - 0.20 /100 WBC    Neutrophils (Absolute) 4.10 1.82 - 7.42 K/uL    Lymphs (Absolute) 3.62 1.00 - 4.80 K/uL    Monos (Absolute) 0.66 0.00 - 0.85 K/uL    Eos (Absolute) 0.06 0.00 - 0.51 K/uL    Baso (Absolute) 0.12 0.00 - 0.12 K/uL    Immature Granulocytes (abs) 0.04 0.00 - 0.11 K/uL    NRBC (Absolute) 0.00 K/uL   COMP METABOLIC PANEL   Result Value Ref Range    Sodium 132 (L) 135 - 145 mmol/L    Potassium 3.9 3.6 - 5.5 mmol/L    Chloride 95 (L) 96 - 112 mmol/L    Co2 22 20 - 33 mmol/L    Anion Gap 15.0 7.0 - 16.0    Glucose 323 (H) 65 - 99 mg/dL    Bun 13 8 - 22 mg/dL     Creatinine 0.97 0.50 - 1.40 mg/dL    Calcium 9.5 8.5 - 10.5 mg/dL    Correct Calcium 9.1 8.5 - 10.5 mg/dL    AST(SGOT) 13 12 - 45 U/L    ALT(SGPT) 14 2 - 50 U/L    Alkaline Phosphatase 111 (H) 30 - 99 U/L    Total Bilirubin 0.5 0.1 - 1.5 mg/dL    Albumin 4.5 3.2 - 4.9 g/dL    Total Protein 7.0 6.0 - 8.2 g/dL    Globulin 2.5 1.9 - 3.5 g/dL    A-G Ratio 1.8 g/dL   LIPASE   Result Value Ref Range    Lipase 14 11 - 82 U/L   ESTIMATED GFR   Result Value Ref Range    GFR (CKD-EPI) 100 >60 mL/min/1.73 m 2         COURSE & MEDICAL DECISION MAKING    ED Observation Status? Yes; I am placing the patient in to an observation status due to a diagnostic uncertainty as well as therapeutic intensity. Patient placed in observation status at 3.00 AM, 7/28/2023.     Observation plan is as follows: IV, Labs, Meds, Fluids, Serial Exams    Upon Reevaluation, the patient's condition has: Improved; and will be discharged.    Patient discharged from ED Observation status at 5:37 AM 7/28/2023    INITIAL ASSESSMENT, COURSE AND PLAN  Care Narrative: This is a 43 y.o. M who presents with a gradual onset headache.  He has normal vital signs, is neurologically intact and overall systemically well appearing.  Given the gradual onset of patient's pain and the patient's reassuring exam, I have much lower suspicion for a more emergent etiology including intracranial bleed and specifically doubt SAH.  No history of trauma.  No morning headaches to suggest intracranial mass.  He has no neurological deficit to suggest cervical artery dissection. He is afebrile and non toxic, no meningismus on exam and doubt acute CNS infection including meningitis or brain abscess.   He has no know risk factors (obesity, prothrombotic condition, infection, malignancy) to suggest venous sinus thrombosis.  At this age, very unlikely temporal arteritis or acute angle glaucoma.  I do not see an indication to obtain advanced imaging.  More likely benign headache such as  tension headache or migraine.   Labs reassuring, normal wbc count .  He is hyponatremic with an elevated blood glucose of 323.  No acidosis and not consistent with DKA.  Patient treated with  IVF, benadryl, compazine, and toradol.     On reevaluation, he reports improvement.  They continue to look overall well with normal vitals and neurologically intact.  Think he is appropriate for discharge home and outpatient management.  He expresses understanding and was discharged home in good condition.    HYDRATION: Based on the patient's presentation of Hyperglycemia the patient was given IV fluids. IV Hydration was used because oral hydration was not adequate alone. Upon recheck following hydration, the patient was improved.        DISPOSITION AND DISCUSSIONS  I have discussed management of the patient with the following physicians and MANNY's:  None    Discussion of management with other QHP or appropriate source(s): None     Escalation of care considered, and ultimately not performed:diagnostic imaging    Barriers to care at this time, including but not limited to:  None .     Decision tools and prescription drugs considered including, but not limited to: Pain Medications Ibuprofen and Tylenol .    FINAL DIAGNOSIS  1. Acute nonintractable headache, unspecified headache type Acute   2. Hyperglycemia Acute          Electronically signed by: Lamar Rodriguez M.D., 7/28/2023 2:58 AM

## 2023-07-28 NOTE — ED NOTES
Pt medicated per MAR. Pt educated on medication administration. Identifiers used. Verbalized understanding. Safety measures in place, call light in reach

## 2023-07-28 NOTE — ED TRIAGE NOTES
Chief Complaint   Patient presents with    Headache     X 4 days, pt has tried multiple OTC medications with no relief. Reports associated nausea and weakness.        Pt to triage with steady gait for above complaint. Presents with HA with associated nausea. +photosensitivity +sound sensitivity. +cloudy vision    Pt back to [facility], educated on triage process and encourage to alert staff of any changes.     Vitals:    07/28/23 0055   BP: 107/78   Pulse: 99   Resp: 18   Temp: 36.1 °C (96.9 °F)   SpO2: 98%

## 2023-07-28 NOTE — ED NOTES
This RN agree with triage. Pt ambualte to yel 61 independlty with balanced gait. Pt to leopoldo, on monitor

## 2023-07-29 ENCOUNTER — APPOINTMENT (OUTPATIENT)
Dept: RADIOLOGY | Facility: MEDICAL CENTER | Age: 43
End: 2023-07-29
Attending: STUDENT IN AN ORGANIZED HEALTH CARE EDUCATION/TRAINING PROGRAM
Payer: MEDICAID

## 2023-07-29 VITALS
WEIGHT: 249.78 LBS | OXYGEN SATURATION: 97 % | TEMPERATURE: 98.4 F | HEART RATE: 51 BPM | HEIGHT: 78 IN | SYSTOLIC BLOOD PRESSURE: 102 MMHG | RESPIRATION RATE: 16 BRPM | BODY MASS INDEX: 28.9 KG/M2 | DIASTOLIC BLOOD PRESSURE: 67 MMHG

## 2023-07-29 PROCEDURE — 96375 TX/PRO/DX INJ NEW DRUG ADDON: CPT | Mod: XU

## 2023-07-29 PROCEDURE — 700111 HCHG RX REV CODE 636 W/ 250 OVERRIDE (IP): Mod: JZ,UD | Performed by: STUDENT IN AN ORGANIZED HEALTH CARE EDUCATION/TRAINING PROGRAM

## 2023-07-29 PROCEDURE — 700117 HCHG RX CONTRAST REV CODE 255: Performed by: STUDENT IN AN ORGANIZED HEALTH CARE EDUCATION/TRAINING PROGRAM

## 2023-07-29 PROCEDURE — 70496 CT ANGIOGRAPHY HEAD: CPT

## 2023-07-29 PROCEDURE — 70498 CT ANGIOGRAPHY NECK: CPT

## 2023-07-29 RX ORDER — MORPHINE SULFATE 4 MG/ML
4 INJECTION INTRAVENOUS ONCE
Status: COMPLETED | OUTPATIENT
Start: 2023-07-29 | End: 2023-07-29

## 2023-07-29 RX ADMIN — MORPHINE SULFATE 4 MG: 4 INJECTION, SOLUTION INTRAMUSCULAR; INTRAVENOUS at 00:27

## 2023-07-29 RX ADMIN — IOHEXOL 70 ML: 350 INJECTION, SOLUTION INTRAVENOUS at 01:45

## 2023-07-29 ASSESSMENT — PAIN DESCRIPTION - PAIN TYPE: TYPE: ACUTE PAIN

## 2023-07-29 NOTE — DISCHARGE INSTRUCTIONS
You have a finding of a mass on your neck which is very concerning for cancer.  You need to call and have a follow-up with ENT surgery as soon as possible.  Referral has been placed.    Please discuss the following findings with your regular doctor:  CT-CTA NECK WITH & W/O-POST PROCESSING   Final Result         1.  CT angiogram of the neck within normal limits.   2.  Heterogeneously enhancing mass in the midline of the neck with adjacent inferior nodule, should be considered neoplastic unless proven otherwise.         CT-CTA HEAD WITH & W/O-POST PROCESS   Final Result         1.  No large vessel occlusion or aneurysm identified.        Labs Reviewed - No data to display

## 2023-07-29 NOTE — ED NOTES
Pt discharged to home. Discharge paperwork provided. Education provided by ERP.  Pt was given follow up instructions.  Pt verbalized understanding of all instructions for discharge. Patient ambulatory, alert and oriented x 4, out of ER with all belongings and steady gait.

## 2023-07-29 NOTE — ED PROVIDER NOTES
ED Provider Note    CHIEF COMPLAINT  Chief Complaint   Patient presents with    Headache       EXTERNAL RECORDS REVIEWED  External ED Note hyperglycemia ER visit on 7/20/2023 at [facility]    HPI/ROS  LIMITATION TO HISTORY   Select: : None  OUTSIDE HISTORIAN(S):      Norm Prabhakar is a 43 y.o. male who presents with headache.  Patient reports chronic headaches.  Patient reports this headache is the same as normal.  Patient denies vision changes at this time, unilateral weakness or numbness, slurred speech.  Patient denies difficulty ambulating.  Patient reports that this facility last night and received IV medications which were helpful in symptom control.  Patient reports that the headache returned at 4 PM today.  Patient has seen neurology in the past for the symptoms but has not followed up them for a prolonged period of time and would like to follow-up.  Patient denies that this is the worst headache of his life.  Patient denies that this is a thunderclap headache.    PAST MEDICAL HISTORY   has a past medical history of Anxiety, ASTHMA, Bipolar 1 disorder (HCC), Depression, Diabetes (HCC), Fall, Glaucoma, Glaucoma (1982), Hypothyroidism, Indigestion, Mental disorder, Murmur, Pneumonia, Psychiatric problem (2002), S/P thyroidectomy, Seizure ([facility]) (2010), Seizure disorder ([facility]), and Unspecified disorder of thyroid.    SURGICAL HISTORY   has a past surgical history that includes eye surgery; thyroid lobectomy; and other.    FAMILY HISTORY  Family History   Problem Relation Age of Onset    Hypertension Mother     Heart Disease Mother     Lung Disease Mother     Stroke Maternal Grandmother        SOCIAL HISTORY  Social History     Tobacco Use    Smoking status: Former     Packs/day: 0.25     Types: Cigarettes, Cigars     Quit date: 4/1/2020     Years since quitting: 3.3    Smokeless tobacco: Never    Tobacco comments:     3 cigarettes a day   Vaping Use    Vaping Use: Every day    Substances:  "Nicotine, THC, CBD, Flavoring    Devices: Disposable   Substance and Sexual Activity    Alcohol use: Yes     Comment: occasionally    Drug use: Yes     Types: Inhaled     Comment: marijuana    Sexual activity: Not Currently       CURRENT MEDICATIONS  Home Medications       Reviewed by Fam Garcia R.N. (Registered Nurse) on 07/28/23 at 2100  Med List Status: Not Addressed     Medication Last Dose Status   Alcohol Swabs  Active   Blood Glucose Monitoring Suppl (BLOOD GLUCOSE SYSTEM SONDRA) Kit  Active   budesonide-formoterol (SYMBICORT) 160-4.5 MCG/ACT Aerosol  Active   divalproex (DEPAKOTE) 250 MG Tablet Delayed Response  Active   divalproex (DEPAKOTE) 250 MG Tablet Delayed Response  Active   divalproex (DEPAKOTE) 500 MG Tablet Delayed Response  Active   divalproex (DEPAKOTE) 500 MG Tablet Delayed Response  Active   glucose blood strip  Active   insulin glargine 100 UNIT/ML SC SOPN injection  Active   insulin lispro 100 UNIT/ML SC SOPN injection PEN  Active   Insulin Pen Needle 32 G x 4 mm  Active   Lancets  Active   levothyroxine (SYNTHROID) 200 MCG Tab  Active                    ALLERGIES  Allergies   Allergen Reactions    Abilify      \"Feeling tired, like I don't even know whats going on around me\"    Fish      Pt reports salmon causes him to be sick to his stomach  Not listed on MAR noted 2/3/2021      Geodon [Ziprasidone Hcl] Anaphylaxis     Anaphylaxis per patient    Aripiprazole      \"I became lethargic.\"    Hydroxyzine Itching     Pt will only state \"I feel itchy when I take it    Ziprasidone        PHYSICAL EXAM  VITAL SIGNS: BP 96/63   Pulse (!) 42   Temp 36.3 °C (97.3 °F) (Temporal)   Resp 17   Ht 1.981 m (6' 6\")   Wt 113 kg (249 lb 12.5 oz)   SpO2 94%   BMI 28.87 kg/m²    Vitals and nursing note reviewed.   Constitutional:       Comments: Patient is lying in bed supine, pleasant, conversant, speaking in complete sentences   HENT:      Head: Normocephalic and atraumatic.   Eyes:      Extraocular " Movements: Extraocular movements intact.      Conjunctiva/sclera: Conjunctivae normal.      Pupils: Pupils are equal, round, and reactive to light.  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  Cardiovascular:      Pulses: Normal pulses.      Comments: HR 65  Pulmonary:      Effort: Pulmonary effort is normal. No respiratory distress.   Musculoskeletal:         General: No swelling. Normal range of motion.      Cervical back: Normal range of motion. No rigidity.   Skin:     General: Skin is warm and dry.      Capillary Refill: Capillary refill takes less than 2 seconds.   Neurological:      Mental Status: Alert.  No cranial nerve deficits.  Sensation light touch grossly intact in the bilateral upper and lower extremities, range of motion intact in bilateral upper and lower extremities.      DIAGNOSTIC STUDIES / PROCEDURES    RADIOLOGY  I have independently interpreted the diagnostic imaging associated with this visit and am waiting the final reading from the radiologist.   My preliminary interpretation is as follows: Midline neck neoplasm  Radiologist interpretation:  Neck neoplasm    COURSE & MEDICAL DECISION MAKING      INITIAL ASSESSMENT, COURSE AND PLAN  Care Narrative: Patient without evidence of acute CVA, no focal neurologic deficits.  Patient's headache severity and character is inconsistent with intracranial hemorrhage given that it is not the worst headache of his life, not thunderclap  and is similar to previous headache episodes.  Patient also had a negative CT done in June, intracranial mass highly unlikely at this time.  IV fluids, Compazine and Benadryl for symptom control.  No evidence of meningitis versus encephalitis, no neck tenderness, meningismus or fever.  Disposition pending symptom control and work-up.    Electronically signed by: Alessio Buitrago M.D., 7/28/2023 11:02 PM    Patient's headache has improved, heart rate goes down while he is sleeping but when he is awake heart rate is within the  normal range, no acute cardiopulmonary process suspected at this time.  ENT referral is in place for neck neoplasm, patient has been counseled extensively regarding the urgency of the finding and the need to follow-up or risk death.  I have explained I believe this is high risk for cancer.      Repeat physical exam benign.  I doubt any serious emergency process at this time.  Patient and/or family, friends given strict return precautions for worsening symptoms and care instructions. They have demonstrated understanding of discharge instructions through teach back mechanism. Advised PCP follow-up in 1-2 days.  Patient/family/friend expresses understanding and agrees to plan.    This dictation has been created using voice recognition software. I am continuously working with the software to minimize the number of voice recognition errors and I have made every attempt to manually correct the errors within my dictation. However errors  related to this voice recognition software may still exist and should be interpreted within the appropriate context.     Electronically signed by: Alessio Buitrago M.D., 7/29/2023 3:44 AM        ADDITIONAL PROBLEM LIST  #1: Headache    2.:  Neck mass  DISPOSITION AND DISCUSSIONS      FINAL DIAGNOSIS  1. Neck malignant neoplasm (HCC)    2. Acute intractable tension-type headache    3. Uncontrolled other specified diabetes mellitus with hyperglycemia (HCC)           Electronically signed by: Alessio Buitrago M.D., 7/28/2023 10:59 PM

## 2023-07-29 NOTE — ED TRIAGE NOTES
".  Chief Complaint   Patient presents with    Headache       43 yr patient walked in to triage for above complaint. Per patient he was seen here last night for the same symptoms and was given some medication and the headache went away but now is back.     Pt placed in lobby. Pt educated on triage process. Pt encouraged to alert staff for any changes.     Patient and staff wearing appropriate PPE    /72   Pulse 65   Temp 36.7 °C (98.1 °F) (Temporal)   Resp 18   Ht 1.981 m (6' 6\")   Wt 113 kg (249 lb 12.5 oz)   SpO2 97%   BMI 28.87 kg/m²     "

## 2023-07-29 NOTE — ED NOTES
Reassessed pt pain and said he still has 7/10 headache, but pt is sleeping on bed and not calling for assistance

## 2023-07-29 NOTE — ED NOTES
Notified to the ERP that has sleeping on bed, but when tried to wake him up he said that he still has headache, pain scale: 7/10  Awaiting for order

## 2023-08-09 ENCOUNTER — APPOINTMENT (OUTPATIENT)
Dept: RADIOLOGY | Facility: MEDICAL CENTER | Age: 43
End: 2023-08-09
Attending: STUDENT IN AN ORGANIZED HEALTH CARE EDUCATION/TRAINING PROGRAM
Payer: MEDICAID

## 2023-08-09 ENCOUNTER — HOSPITAL ENCOUNTER (EMERGENCY)
Facility: MEDICAL CENTER | Age: 43
End: 2023-08-09
Attending: STUDENT IN AN ORGANIZED HEALTH CARE EDUCATION/TRAINING PROGRAM
Payer: MEDICAID

## 2023-08-09 VITALS
BODY MASS INDEX: 28 KG/M2 | TEMPERATURE: 97.5 F | DIASTOLIC BLOOD PRESSURE: 71 MMHG | OXYGEN SATURATION: 94 % | WEIGHT: 242 LBS | SYSTOLIC BLOOD PRESSURE: 109 MMHG | HEIGHT: 78 IN | HEART RATE: 77 BPM | RESPIRATION RATE: 16 BRPM

## 2023-08-09 DIAGNOSIS — B97.89 SORE THROAT (VIRAL): ICD-10-CM

## 2023-08-09 DIAGNOSIS — J02.8 SORE THROAT (VIRAL): ICD-10-CM

## 2023-08-09 DIAGNOSIS — R05.1 ACUTE COUGH: ICD-10-CM

## 2023-08-09 PROCEDURE — 71045 X-RAY EXAM CHEST 1 VIEW: CPT

## 2023-08-09 PROCEDURE — 700102 HCHG RX REV CODE 250 W/ 637 OVERRIDE(OP): Mod: UD | Performed by: STUDENT IN AN ORGANIZED HEALTH CARE EDUCATION/TRAINING PROGRAM

## 2023-08-09 PROCEDURE — A9270 NON-COVERED ITEM OR SERVICE: HCPCS | Mod: UD | Performed by: STUDENT IN AN ORGANIZED HEALTH CARE EDUCATION/TRAINING PROGRAM

## 2023-08-09 PROCEDURE — 99283 EMERGENCY DEPT VISIT LOW MDM: CPT

## 2023-08-09 RX ORDER — ALBUTEROL SULFATE 90 UG/1
2 AEROSOL, METERED RESPIRATORY (INHALATION) EVERY 6 HOURS PRN
Qty: 8.5 G | Refills: 0 | Status: SHIPPED | OUTPATIENT
Start: 2023-08-09 | End: 2023-10-27

## 2023-08-09 RX ORDER — ALBUTEROL SULFATE 90 UG/1
2 AEROSOL, METERED RESPIRATORY (INHALATION) ONCE
Status: COMPLETED | OUTPATIENT
Start: 2023-08-09 | End: 2023-08-09

## 2023-08-09 RX ADMIN — ALBUTEROL SULFATE 2 PUFF: 90 AEROSOL, METERED RESPIRATORY (INHALATION) at 01:57

## 2023-08-09 ASSESSMENT — FIBROSIS 4 INDEX: FIB4 SCORE: 0.43

## 2023-08-09 NOTE — ED NOTES
Pt ambulated from lobby to room 56 without assistance, tolerated well. Pt placed in hospital gown and is now sitting up in bed on monitor with even and unlabored breaths, in no apparent distress at this time. Chart up for ERP.

## 2023-08-09 NOTE — ED NOTES
Pt medicated per MAR and educated how to use spacer and inhaler. Pt resting with even chest rise and fall, reports no additional needs at this time, call light available and in reach.

## 2023-08-09 NOTE — ED TRIAGE NOTES
Chief Complaint   Patient presents with    Cough     Cough with flu like symptoms since 3 days ago: sore throat, painful swallowing  Denied any fever or chills       Pt came in to triage ambulatory with steady gait for above complaints.     Pt is alert and oriented x 4, speaking in full sentences, follows commands and responds appropriately to questions.     Respirations are even and unlabored.    Pt placed in lobby. Pt educated on triage process. Pt encouraged to inform staff for any changes in condition or if needs help    Will continue to monitor for any complications while waiting to be room in.    Vitals:    08/09/23 0048   BP: 126/82   Pulse: 93   Resp: 18   Temp: 35.9 °C (96.7 °F)   SpO2: 94%

## 2023-08-09 NOTE — DISCHARGE INSTRUCTIONS
Take the albuterol inhaler we prescribed to help with your cough.  Your chest x-ray shows no evidence of pneumonia.  You are most likely sick with a viral respiratory illness.    Take the following medications for pain/fever at home:  Acetaminophen (Tylenol): Take 650 mg (2 regular strength) every 6 hours. Do not take more than 3,000mg in a 24 hour period.   Ibuprofen: Take 400-600 mg (2-3 regular strength) every 6 hours. Take with food.   Alternate the two medications and you can take one of them every 3 hours.     As we discussed, you most likely have a virus that is causing you to be sick.  Viruses can cause a wide variety of symptoms.  Unfortunately antibiotics do not help viruses get better faster.  We only use antibiotics in cases of bacterial infection which fortunately we do not think you have at this time.  Supportive care is the most effective treatment for a virus.  This includes allowing plenty of opportunity for rest and staying hydrated.       Many viruses cause respiratory symptoms and can cause a cough.  The viruses can cause mild damage to the lungs and this can cause a cough to persist for even several weeks as the lungs recover.    Return to the emergency department if you develop difficulty breathing, severe lethargy, severe abdominal pain, are unable to tolerate oral fluids, are unable to bear weight, or any other concerns.      Return to the emergency department if you develop difficulty breathing, lethargy, or other concerns.

## 2023-08-09 NOTE — ED PROVIDER NOTES
ED Provider Note    CHIEF COMPLAINT  Chief Complaint   Patient presents with    Cough     Cough with flu like symptoms since 3 days ago: sore throat, painful swallowing  Denied any fever or chills       EXTERNAL RECORDS REVIEWED  Recent ED visit 7/28/2023 that showed a suspicious midline neck mass concerning for malignancy.  Patient was referred for ENT evaluation.    HPI/ROS  LIMITATION TO HISTORY   Select: : None    Norm Prabhakar is a 43 y.o. male who presents with chief complaint of cough and sore throat that has been going on for 3 days.  He denies any fevers or shortness of breath.  Denies chest pain, abdominal pain, nausea, vomiting, diarrhea.  He states he came to the ED because he is having trouble sleeping due to his cough.  He states he has been trying over-the-counter cough syrup with minimal effect.  He states his throat only hurts when he is coughing and feels scratchy.  Patient states he does have a history of asthma.    PAST MEDICAL HISTORY  Past Medical History:   Diagnosis Date    Seizure ([facility]) 2010    Psychiatric problem 2002    PTSD    Glaucoma 1982    both eyes/ blind on left eye    Anxiety     BIPOLAR    ASTHMA     Bipolar 1 disorder ([facility])     Depression     Diabetes ([facility])     Type II Diabetes    Fall     passed out 2 wks ago    Glaucoma     Hypothyroidism     Indigestion     once in a while    Mental disorder     learning disabilities; speech impairment; developmental delays    Murmur     since birth    Pneumonia     remote    S/P thyroidectomy     Seizure disorder ([facility])     Unspecified disorder of thyroid         SURGICAL HISTORY  Past Surgical History:   Procedure Laterality Date    EYE SURGERY      OTHER      Hernia Repair when he was 10 yrs old    THYROID LOBECTOMY          FAMILY HISTORY  Family History   Problem Relation Age of Onset    Hypertension Mother     Heart Disease Mother     Lung Disease Mother     Stroke Maternal Grandmother        SOCIAL HISTORY       CURRENT  MEDICATIONS  Home Medications    Medication Sig Taking? Last Dose Authorizing Provider   albuterol 108 (90 Base) MCG/ACT Aero Soln inhalation aerosol Inhale 2 Puffs every 6 hours as needed (cough). Yes  Desirae Snyder M.D.   levothyroxine (SYNTHROID) 200 MCG Tab Take 225 mcg by mouth every morning on an empty stomach.   Dieudonne [facility] Per Protocol, M.D.   divalproex (DEPAKOTE) 500 MG Tablet Delayed Response Take 500 mg by mouth every morning.   [facility] Per Protocol, M.D.   divalproex (DEPAKOTE) 500 MG Tablet Delayed Response Take 1,500 mg by mouth at bedtime.   [facility] Per Protocol, M.D.   divalproex (DEPAKOTE) 250 MG Tablet Delayed Response Take 2 Tablets by mouth every morning.   Augustus Carroll D.O.   divalproex (DEPAKOTE) 250 MG Tablet Delayed Response Take 6 Tablets by mouth every evening.   Augustus Carroll D.O.   insulin glargine 100 UNIT/ML SC SOPN injection Inject 18 Units under the skin every day for 30 days.   Candido Villar D.O.   insulin lispro 100 UNIT/ML SC SOPN injection PEN Inject 2-12 Units under the skin 4 Times a Day,Before Meals and at Bedtime for 30 days.   Candido Villar D.O.   Alcohol Swabs Wipe site with prep pad prior to injection.   Nixon Woo M.D.   glucose blood strip Use one strip to test blood sugar three times daily before meals.   Nixon Woo M.D.   Insulin Pen Needle 32 G x 4 mm Use one pen needle in pen device to inject insulin four times daily.   Nixon Woo M.D.   Lancets Use one lancet to test blood sugar three times daily before meals.   Nixon Woo M.D.   budesonide-formoterol (SYMBICORT) 160-4.5 MCG/ACT Aerosol Inhale 2 Puffs 2 times a day.   Janine Fu, A.P.R.N.   Blood Glucose Monitoring Suppl (BLOOD GLUCOSE SYSTEM SONDRA) Kit Test blood sugar as recommended by provider.   Janine Fu, A.P.R.N.       ALLERGIES  No Known Allergies    PHYSICAL EXAM  /69   Pulse 80   Temp 35.9 °C (96.7 °F) (Temporal)   Resp 18    "Ht 1.981 m (6' 6\")   Wt 110 kg (242 lb)   SpO2 94%   Constitutional: Alert in no apparent distress.  HENT: No signs of trauma, Bilateral external ears normal, Nose normal.   Eyes: Pupils are equal and reactive on right, left eye chronic glaucoma  Neck: Normal range of motion, No tenderness, midline neck mass firm but nontender, supple, No stridor.   Cardiovascular: Regular rate and rhythm, no murmurs.   Thorax & Lungs: Normal breath sounds, No respiratory distress, No wheezing  Abdomen: Soft, No tenderness, No peritoneal signs, No masses, No pulsatile masses.   Skin: Warm, Dry, No erythema, No rash.   Extremities: Intact distal pulses, No edema, No tenderness, No cyanosis  Neurologic: Alert , Normal motor function, Normal speech, No focal deficits noted.   Psychiatric: Affect normal, Judgment normal, Mood normal.         DIAGNOSTIC STUDIES / PROCEDURES    RADIOLOGY  I have independently interpreted the diagnostic imaging associated with this visit and am waiting the final reading from the radiologist.   My preliminary interpretation is a follows: 1 view chest x-ray with no focal infiltrate, no pneumothorax, no pleural effusion  Radiologist interpretation:   DX-CHEST-PORTABLE (1 VIEW)   Final Result      No acute cardiopulmonary disease evident.          COURSE & MEDICAL DECISION MAKING    ED Observation Status? No; Patient does not meet criteria for ED Observation.     INITIAL ASSESSMENT, COURSE AND PLAN  Care Narrative: 43-year-old male presenting with 3 days of cough, sore throat with coughing.  No evidence of strep pharyngitis, peritonsillar abscess or retropharyngeal abscess on exam.  No fevers, given pain is worse with coughing do not feel there is any indication for strep testing at this time.  Will check x-ray to rule out pneumonia, but patient is not hypoxic.  He is reports he has a history of asthma but does not have significant wheezing on exam.  Will give him some albuterol here discharge with inhaler " to help with cough.  Most likely viral illness.  Patient is noted to have a neck mass which was evaluated about 2 weeks ago here in this emergency department and he has already been referred for ENT evaluation.    2:39 AM  Chest x-ray with no evidence of pneumonia, patient remains not hypoxic, will discharge.      ADDITIONAL PROBLEM LIST    Acute cough  Sore throat    DISPOSITION AND DISCUSSIONS    Barriers to care at this time, including but not limited to: Patient does not have established PCP and Patient is homeless.     Decision tools and prescription drugs considered including, but not limited to:  Albuterol .    Discharged home in stable condition    FINAL DIAGNOSIS  1. Acute cough Acute albuterol 108 (90 Base) MCG/ACT Aero Soln inhalation aerosol      2. Sore throat (viral)              Electronically signed by: Desirae Snyder M.D., 08/09/23 1:50 AM

## 2023-08-11 ENCOUNTER — TELEPHONE (OUTPATIENT)
Dept: HEALTH INFORMATION MANAGEMENT | Facility: OTHER | Age: 43
End: 2023-08-11
Payer: MEDICAID

## 2023-08-13 ENCOUNTER — APPOINTMENT (OUTPATIENT)
Dept: RADIOLOGY | Facility: MEDICAL CENTER | Age: 43
End: 2023-08-13
Attending: EMERGENCY MEDICINE
Payer: MEDICAID

## 2023-08-13 ENCOUNTER — HOSPITAL ENCOUNTER (OUTPATIENT)
Facility: MEDICAL CENTER | Age: 43
End: 2023-08-17
Attending: EMERGENCY MEDICINE | Admitting: HOSPITALIST
Payer: MEDICAID

## 2023-08-13 ENCOUNTER — APPOINTMENT (OUTPATIENT)
Dept: RADIOLOGY | Facility: MEDICAL CENTER | Age: 43
End: 2023-08-13
Attending: HOSPITALIST
Payer: MEDICAID

## 2023-08-13 DIAGNOSIS — R05.1 ACUTE COUGH: ICD-10-CM

## 2023-08-13 DIAGNOSIS — R22.1 NECK MASS: ICD-10-CM

## 2023-08-13 DIAGNOSIS — R51.9 ACUTE NONINTRACTABLE HEADACHE, UNSPECIFIED HEADACHE TYPE: ICD-10-CM

## 2023-08-13 DIAGNOSIS — E11.9 TYPE 2 DIABETES MELLITUS WITHOUT COMPLICATION, WITHOUT LONG-TERM CURRENT USE OF INSULIN (HCC): ICD-10-CM

## 2023-08-13 DIAGNOSIS — E78.2 MIXED HYPERLIPIDEMIA: ICD-10-CM

## 2023-08-13 DIAGNOSIS — R73.9 HYPERGLYCEMIA: ICD-10-CM

## 2023-08-13 DIAGNOSIS — E89.0 POSTOPERATIVE HYPOTHYROIDISM: ICD-10-CM

## 2023-08-13 PROBLEM — E87.1 HYPONATREMIA: Status: ACTIVE | Noted: 2023-08-13

## 2023-08-13 LAB
ALBUMIN SERPL BCP-MCNC: 4.2 G/DL (ref 3.2–4.9)
ALBUMIN/GLOB SERPL: 2.3 G/DL
ALP SERPL-CCNC: 103 U/L (ref 30–99)
ALT SERPL-CCNC: 21 U/L (ref 2–50)
ANION GAP SERPL CALC-SCNC: 12 MMOL/L (ref 7–16)
ANION GAP SERPL CALC-SCNC: 13 MMOL/L (ref 7–16)
ANION GAP SERPL CALC-SCNC: 14 MMOL/L (ref 7–16)
AST SERPL-CCNC: 31 U/L (ref 12–45)
B-OH-BUTYR SERPL-MCNC: 0.28 MMOL/L (ref 0.02–0.27)
BASOPHILS # BLD AUTO: 0.9 % (ref 0–1.8)
BASOPHILS # BLD: 0.1 K/UL (ref 0–0.12)
BILIRUB SERPL-MCNC: 0.3 MG/DL (ref 0.1–1.5)
BUN SERPL-MCNC: 10 MG/DL (ref 8–22)
BUN SERPL-MCNC: 10 MG/DL (ref 8–22)
BUN SERPL-MCNC: 11 MG/DL (ref 8–22)
CALCIUM ALBUM COR SERPL-MCNC: 8.6 MG/DL (ref 8.5–10.5)
CALCIUM SERPL-MCNC: 8.5 MG/DL (ref 8.5–10.5)
CALCIUM SERPL-MCNC: 8.8 MG/DL (ref 8.5–10.5)
CALCIUM SERPL-MCNC: 9 MG/DL (ref 8.5–10.5)
CHLORIDE SERPL-SCNC: 93 MMOL/L (ref 96–112)
CHLORIDE SERPL-SCNC: 96 MMOL/L (ref 96–112)
CHLORIDE SERPL-SCNC: 99 MMOL/L (ref 96–112)
CO2 SERPL-SCNC: 22 MMOL/L (ref 20–33)
CO2 SERPL-SCNC: 23 MMOL/L (ref 20–33)
CO2 SERPL-SCNC: 25 MMOL/L (ref 20–33)
CREAT SERPL-MCNC: 0.79 MG/DL (ref 0.5–1.4)
CREAT SERPL-MCNC: 0.81 MG/DL (ref 0.5–1.4)
CREAT SERPL-MCNC: 0.86 MG/DL (ref 0.5–1.4)
EOSINOPHIL # BLD AUTO: 0.12 K/UL (ref 0–0.51)
EOSINOPHIL NFR BLD: 1.1 % (ref 0–6.9)
ERYTHROCYTE [DISTWIDTH] IN BLOOD BY AUTOMATED COUNT: 42.1 FL (ref 35.9–50)
FERRITIN SERPL-MCNC: 194 NG/ML (ref 22–322)
FLUAV RNA SPEC QL NAA+PROBE: NEGATIVE
FLUBV RNA SPEC QL NAA+PROBE: NEGATIVE
GFR SERPLBLD CREATININE-BSD FMLA CKD-EPI: 111 ML/MIN/1.73 M 2
GFR SERPLBLD CREATININE-BSD FMLA CKD-EPI: 113 ML/MIN/1.73 M 2
GFR SERPLBLD CREATININE-BSD FMLA CKD-EPI: 114 ML/MIN/1.73 M 2
GLOBULIN SER CALC-MCNC: 1.8 G/DL (ref 1.9–3.5)
GLUCOSE BLD STRIP.AUTO-MCNC: 367 MG/DL (ref 65–99)
GLUCOSE BLD STRIP.AUTO-MCNC: 370 MG/DL (ref 65–99)
GLUCOSE BLD STRIP.AUTO-MCNC: 441 MG/DL (ref 65–99)
GLUCOSE BLD STRIP.AUTO-MCNC: 447 MG/DL (ref 65–99)
GLUCOSE SERPL-MCNC: 346 MG/DL (ref 65–99)
GLUCOSE SERPL-MCNC: 437 MG/DL (ref 65–99)
GLUCOSE SERPL-MCNC: 443 MG/DL (ref 65–99)
HCT VFR BLD AUTO: 31.8 % (ref 42–52)
HGB BLD-MCNC: 10.9 G/DL (ref 14–18)
IMM GRANULOCYTES # BLD AUTO: 0.15 K/UL (ref 0–0.11)
IMM GRANULOCYTES NFR BLD AUTO: 1.4 % (ref 0–0.9)
IRON SATN MFR SERPL: 20 % (ref 15–55)
IRON SERPL-MCNC: 52 UG/DL (ref 50–180)
LYMPHOCYTES # BLD AUTO: 3.15 K/UL (ref 1–4.8)
LYMPHOCYTES NFR BLD: 29.4 % (ref 22–41)
MAGNESIUM SERPL-MCNC: 1.7 MG/DL (ref 1.5–2.5)
MCH RBC QN AUTO: 30.4 PG (ref 27–33)
MCHC RBC AUTO-ENTMCNC: 34.3 G/DL (ref 32.3–36.5)
MCV RBC AUTO: 88.8 FL (ref 81.4–97.8)
MONOCYTES # BLD AUTO: 0.67 K/UL (ref 0–0.85)
MONOCYTES NFR BLD AUTO: 6.2 % (ref 0–13.4)
NEUTROPHILS # BLD AUTO: 6.54 K/UL (ref 1.82–7.42)
NEUTROPHILS NFR BLD: 61 % (ref 44–72)
NRBC # BLD AUTO: 0 K/UL
NRBC BLD-RTO: 0 /100 WBC (ref 0–0.2)
PLATELET # BLD AUTO: 228 K/UL (ref 164–446)
PMV BLD AUTO: 11 FL (ref 9–12.9)
POTASSIUM SERPL-SCNC: 3.9 MMOL/L (ref 3.6–5.5)
POTASSIUM SERPL-SCNC: 4 MMOL/L (ref 3.6–5.5)
POTASSIUM SERPL-SCNC: 4 MMOL/L (ref 3.6–5.5)
PROCALCITONIN SERPL-MCNC: 0.06 NG/ML
PROT SERPL-MCNC: 6 G/DL (ref 6–8.2)
RBC # BLD AUTO: 3.58 M/UL (ref 4.7–6.1)
RSV RNA SPEC QL NAA+PROBE: NEGATIVE
S PYO DNA SPEC NAA+PROBE: NOT DETECTED
SARS-COV-2 RNA RESP QL NAA+PROBE: NOTDETECTED
SODIUM SERPL-SCNC: 131 MMOL/L (ref 135–145)
SODIUM SERPL-SCNC: 132 MMOL/L (ref 135–145)
SODIUM SERPL-SCNC: 134 MMOL/L (ref 135–145)
SPECIMEN SOURCE: NORMAL
T4 FREE SERPL-MCNC: <0.11 NG/DL (ref 0.93–1.7)
TIBC SERPL-MCNC: 259 UG/DL (ref 250–450)
TSH SERPL DL<=0.005 MIU/L-ACNC: 91.7 UIU/ML (ref 0.38–5.33)
UIBC SERPL-MCNC: 207 UG/DL (ref 110–370)
VIT B12 SERPL-MCNC: 506 PG/ML (ref 211–911)
WBC # BLD AUTO: 10.7 K/UL (ref 4.8–10.8)

## 2023-08-13 PROCEDURE — 80053 COMPREHEN METABOLIC PANEL: CPT

## 2023-08-13 PROCEDURE — C9803 HOPD COVID-19 SPEC COLLECT: HCPCS | Performed by: HOSPITALIST

## 2023-08-13 PROCEDURE — 700102 HCHG RX REV CODE 250 W/ 637 OVERRIDE(OP): Mod: UD | Performed by: HOSPITALIST

## 2023-08-13 PROCEDURE — 82607 VITAMIN B-12: CPT

## 2023-08-13 PROCEDURE — 36415 COLL VENOUS BLD VENIPUNCTURE: CPT

## 2023-08-13 PROCEDURE — A9270 NON-COVERED ITEM OR SERVICE: HCPCS | Mod: UD | Performed by: EMERGENCY MEDICINE

## 2023-08-13 PROCEDURE — 99222 1ST HOSP IP/OBS MODERATE 55: CPT | Performed by: HOSPITALIST

## 2023-08-13 PROCEDURE — 82728 ASSAY OF FERRITIN: CPT

## 2023-08-13 PROCEDURE — 84443 ASSAY THYROID STIM HORMONE: CPT

## 2023-08-13 PROCEDURE — 96372 THER/PROPH/DIAG INJ SC/IM: CPT

## 2023-08-13 PROCEDURE — A9270 NON-COVERED ITEM OR SERVICE: HCPCS | Mod: UD | Performed by: HOSPITALIST

## 2023-08-13 PROCEDURE — 99285 EMERGENCY DEPT VISIT HI MDM: CPT

## 2023-08-13 PROCEDURE — 0241U HCHG SARS-COV-2 COVID-19 NFCT DS RESP RNA 4 TRGT MIC: CPT

## 2023-08-13 PROCEDURE — 84145 PROCALCITONIN (PCT): CPT

## 2023-08-13 PROCEDURE — 85025 COMPLETE CBC W/AUTO DIFF WBC: CPT

## 2023-08-13 PROCEDURE — 80048 BASIC METABOLIC PNL TOTAL CA: CPT

## 2023-08-13 PROCEDURE — 700102 HCHG RX REV CODE 250 W/ 637 OVERRIDE(OP): Mod: UD | Performed by: EMERGENCY MEDICINE

## 2023-08-13 PROCEDURE — 83550 IRON BINDING TEST: CPT

## 2023-08-13 PROCEDURE — 94640 AIRWAY INHALATION TREATMENT: CPT

## 2023-08-13 PROCEDURE — 83540 ASSAY OF IRON: CPT

## 2023-08-13 PROCEDURE — G0378 HOSPITAL OBSERVATION PER HR: HCPCS

## 2023-08-13 PROCEDURE — 87651 STREP A DNA AMP PROBE: CPT

## 2023-08-13 PROCEDURE — 71045 X-RAY EXAM CHEST 1 VIEW: CPT

## 2023-08-13 PROCEDURE — 70360 X-RAY EXAM OF NECK: CPT

## 2023-08-13 PROCEDURE — 83735 ASSAY OF MAGNESIUM: CPT

## 2023-08-13 PROCEDURE — 82010 KETONE BODYS QUAN: CPT

## 2023-08-13 PROCEDURE — 82962 GLUCOSE BLOOD TEST: CPT | Mod: 91

## 2023-08-13 PROCEDURE — 700101 HCHG RX REV CODE 250: Mod: UD | Performed by: EMERGENCY MEDICINE

## 2023-08-13 PROCEDURE — 84439 ASSAY OF FREE THYROXINE: CPT

## 2023-08-13 PROCEDURE — 700105 HCHG RX REV CODE 258: Mod: UD | Performed by: HOSPITALIST

## 2023-08-13 PROCEDURE — 700105 HCHG RX REV CODE 258: Mod: JZ,UD | Performed by: EMERGENCY MEDICINE

## 2023-08-13 RX ORDER — IPRATROPIUM BROMIDE AND ALBUTEROL SULFATE 2.5; .5 MG/3ML; MG/3ML
3 SOLUTION RESPIRATORY (INHALATION)
Status: COMPLETED | OUTPATIENT
Start: 2023-08-13 | End: 2023-08-13

## 2023-08-13 RX ORDER — DIVALPROEX SODIUM 500 MG/1
1500 TABLET, DELAYED RELEASE ORAL NIGHTLY
Status: DISCONTINUED | OUTPATIENT
Start: 2023-08-13 | End: 2023-08-17 | Stop reason: HOSPADM

## 2023-08-13 RX ORDER — POLYETHYLENE GLYCOL 3350 17 G/17G
1 POWDER, FOR SOLUTION ORAL
Status: DISCONTINUED | OUTPATIENT
Start: 2023-08-13 | End: 2023-08-17 | Stop reason: HOSPADM

## 2023-08-13 RX ORDER — LEVOTHYROXINE SODIUM 0.03 MG/1
25 TABLET ORAL
COMMUNITY

## 2023-08-13 RX ORDER — LEVOTHYROXINE SODIUM 0.12 MG/1
125 TABLET ORAL
Status: DISCONTINUED | OUTPATIENT
Start: 2023-08-13 | End: 2023-08-13

## 2023-08-13 RX ORDER — LEVOTHYROXINE SODIUM 0.1 MG/1
100 TABLET ORAL
Status: DISCONTINUED | OUTPATIENT
Start: 2023-08-13 | End: 2023-08-17 | Stop reason: HOSPADM

## 2023-08-13 RX ORDER — TRAZODONE HYDROCHLORIDE 50 MG/1
50 TABLET ORAL NIGHTLY
COMMUNITY

## 2023-08-13 RX ORDER — ALBUTEROL SULFATE 90 UG/1
2 AEROSOL, METERED RESPIRATORY (INHALATION)
Status: DISCONTINUED | OUTPATIENT
Start: 2023-08-13 | End: 2023-08-14

## 2023-08-13 RX ORDER — IBUPROFEN 800 MG/1
400 TABLET ORAL ONCE
Status: COMPLETED | OUTPATIENT
Start: 2023-08-13 | End: 2023-08-13

## 2023-08-13 RX ORDER — LEVOTHYROXINE SODIUM 0.1 MG/1
100 TABLET ORAL
Status: DISCONTINUED | OUTPATIENT
Start: 2023-08-13 | End: 2023-08-13

## 2023-08-13 RX ORDER — AMOXICILLIN 250 MG
2 CAPSULE ORAL 2 TIMES DAILY
Status: DISCONTINUED | OUTPATIENT
Start: 2023-08-13 | End: 2023-08-17 | Stop reason: HOSPADM

## 2023-08-13 RX ORDER — BISACODYL 10 MG
10 SUPPOSITORY, RECTAL RECTAL
Status: DISCONTINUED | OUTPATIENT
Start: 2023-08-13 | End: 2023-08-17 | Stop reason: HOSPADM

## 2023-08-13 RX ORDER — BUDESONIDE AND FORMOTEROL FUMARATE DIHYDRATE 160; 4.5 UG/1; UG/1
2 AEROSOL RESPIRATORY (INHALATION) 2 TIMES DAILY
Status: DISCONTINUED | OUTPATIENT
Start: 2023-08-13 | End: 2023-08-13

## 2023-08-13 RX ORDER — INSULIN GLARGINE 100 [IU]/ML
34 INJECTION, SOLUTION SUBCUTANEOUS EVERY MORNING
COMMUNITY
End: 2023-09-17 | Stop reason: SDUPTHER

## 2023-08-13 RX ORDER — DEXTROSE MONOHYDRATE 25 G/50ML
25 INJECTION, SOLUTION INTRAVENOUS
Status: DISCONTINUED | OUTPATIENT
Start: 2023-08-13 | End: 2023-08-17 | Stop reason: HOSPADM

## 2023-08-13 RX ORDER — SODIUM CHLORIDE 9 MG/ML
INJECTION, SOLUTION INTRAVENOUS CONTINUOUS
Status: DISCONTINUED | OUTPATIENT
Start: 2023-08-13 | End: 2023-08-15

## 2023-08-13 RX ORDER — DIVALPROEX SODIUM 500 MG/1
500 TABLET, DELAYED RELEASE ORAL EVERY MORNING
Status: DISCONTINUED | OUTPATIENT
Start: 2023-08-13 | End: 2023-08-13

## 2023-08-13 RX ORDER — LEVOTHYROXINE SODIUM 0.12 MG/1
125 TABLET ORAL
Status: DISCONTINUED | OUTPATIENT
Start: 2023-08-13 | End: 2023-08-17 | Stop reason: HOSPADM

## 2023-08-13 RX ORDER — DIVALPROEX SODIUM 500 MG/1
500 TABLET, DELAYED RELEASE ORAL EVERY MORNING
Status: DISCONTINUED | OUTPATIENT
Start: 2023-08-13 | End: 2023-08-17 | Stop reason: HOSPADM

## 2023-08-13 RX ORDER — SODIUM CHLORIDE, SODIUM LACTATE, POTASSIUM CHLORIDE, CALCIUM CHLORIDE 600; 310; 30; 20 MG/100ML; MG/100ML; MG/100ML; MG/100ML
1000 INJECTION, SOLUTION INTRAVENOUS ONCE
Status: COMPLETED | OUTPATIENT
Start: 2023-08-13 | End: 2023-08-13

## 2023-08-13 RX ORDER — BENZONATATE 100 MG/1
100 CAPSULE ORAL ONCE
Status: COMPLETED | OUTPATIENT
Start: 2023-08-13 | End: 2023-08-13

## 2023-08-13 RX ORDER — SERTRALINE HYDROCHLORIDE 100 MG/1
150 TABLET, FILM COATED ORAL DAILY
COMMUNITY

## 2023-08-13 RX ORDER — LURASIDONE HYDROCHLORIDE 80 MG/1
80 TABLET, FILM COATED ORAL
COMMUNITY

## 2023-08-13 RX ADMIN — MOMETASONE FUROATE AND FORMOTEROL FUMARATE DIHYDRATE 2 PUFF: 200; 5 AEROSOL RESPIRATORY (INHALATION) at 20:48

## 2023-08-13 RX ADMIN — IBUPROFEN 400 MG: 800 TABLET, FILM COATED ORAL at 14:04

## 2023-08-13 RX ADMIN — INSULIN HUMAN 6 UNITS: 100 INJECTION, SOLUTION PARENTERAL at 18:30

## 2023-08-13 RX ADMIN — DIVALPROEX SODIUM 1500 MG: 500 TABLET, DELAYED RELEASE ORAL at 20:47

## 2023-08-13 RX ADMIN — SODIUM CHLORIDE: 9 INJECTION, SOLUTION INTRAVENOUS at 10:58

## 2023-08-13 RX ADMIN — SODIUM CHLORIDE, POTASSIUM CHLORIDE, SODIUM LACTATE AND CALCIUM CHLORIDE 1000 ML: 600; 310; 30; 20 INJECTION, SOLUTION INTRAVENOUS at 06:49

## 2023-08-13 RX ADMIN — DIVALPROEX SODIUM 500 MG: 500 TABLET, DELAYED RELEASE ORAL at 13:25

## 2023-08-13 RX ADMIN — INSULIN HUMAN 9 UNITS: 100 INJECTION, SOLUTION PARENTERAL at 10:49

## 2023-08-13 RX ADMIN — INSULIN GLARGINE-YFGN 20 UNITS: 100 INJECTION, SOLUTION SUBCUTANEOUS at 11:58

## 2023-08-13 RX ADMIN — IPRATROPIUM BROMIDE AND ALBUTEROL SULFATE 3 ML: 2.5; .5 SOLUTION RESPIRATORY (INHALATION) at 05:01

## 2023-08-13 RX ADMIN — BENZONATATE 100 MG: 100 CAPSULE ORAL at 05:00

## 2023-08-13 RX ADMIN — SODIUM CHLORIDE: 9 INJECTION, SOLUTION INTRAVENOUS at 13:31

## 2023-08-13 RX ADMIN — LEVOTHYROXINE SODIUM 100 MCG: 0.1 TABLET ORAL at 13:25

## 2023-08-13 RX ADMIN — INSULIN HUMAN 8 UNITS: 100 INJECTION, SOLUTION PARENTERAL at 20:49

## 2023-08-13 RX ADMIN — LEVOTHYROXINE SODIUM 125 MCG: 0.12 TABLET ORAL at 13:25

## 2023-08-13 ASSESSMENT — ENCOUNTER SYMPTOMS
VOMITING: 0
HEMOPTYSIS: 0
BRUISES/BLEEDS EASILY: 0
HEADACHES: 0
STRIDOR: 0
HEARTBURN: 0
DOUBLE VISION: 0
BACK PAIN: 0
SORE THROAT: 1
PALPITATIONS: 0
WHEEZING: 0
PND: 0
CLAUDICATION: 0
DEPRESSION: 0
MYALGIAS: 0
FEVER: 0
COUGH: 1
DIZZINESS: 0
NAUSEA: 0
CHILLS: 0
BLURRED VISION: 0

## 2023-08-13 ASSESSMENT — LIFESTYLE VARIABLES
EVER FELT BAD OR GUILTY ABOUT YOUR DRINKING: NO
CONSUMPTION TOTAL: NEGATIVE
ALCOHOL_USE: NO
HAVE YOU EVER FELT YOU SHOULD CUT DOWN ON YOUR DRINKING: NO
TOTAL SCORE: 0
EVER HAD A DRINK FIRST THING IN THE MORNING TO STEADY YOUR NERVES TO GET RID OF A HANGOVER: NO
AVERAGE NUMBER OF DAYS PER WEEK YOU HAVE A DRINK CONTAINING ALCOHOL: 0
HOW MANY TIMES IN THE PAST YEAR HAVE YOU HAD 5 OR MORE DRINKS IN A DAY: 0
TOTAL SCORE: 0
TOTAL SCORE: 0
ON A TYPICAL DAY WHEN YOU DRINK ALCOHOL HOW MANY DRINKS DO YOU HAVE: 0
HAVE PEOPLE ANNOYED YOU BY CRITICIZING YOUR DRINKING: NO

## 2023-08-13 ASSESSMENT — COGNITIVE AND FUNCTIONAL STATUS - GENERAL
CLIMB 3 TO 5 STEPS WITH RAILING: A LITTLE
MOBILITY SCORE: 22
WALKING IN HOSPITAL ROOM: A LITTLE
SUGGESTED CMS G CODE MODIFIER DAILY ACTIVITY: CH
DAILY ACTIVITIY SCORE: 24
SUGGESTED CMS G CODE MODIFIER MOBILITY: CJ

## 2023-08-13 ASSESSMENT — FIBROSIS 4 INDEX
FIB4 SCORE: 1.22
FIB4 SCORE: 0.43
FIB4 SCORE: 1.22

## 2023-08-13 ASSESSMENT — PAIN DESCRIPTION - PAIN TYPE
TYPE: ACUTE PAIN
TYPE: ACUTE PAIN

## 2023-08-13 NOTE — CONSULTS
CC: neck mass    HPI: Norm Prabhakar is a 43 y.o. male with 1 year of enlarging neck mass. Had thyroid surgery as a teenager (believes it was benign). Has not been on any thyroid replacement medication. Neck mass becoming uncomfortable, can feel it when swallowing. Has been recommended to have outpatient follow up but given social situation has not been able to follow through with this. Lives at the [facility]. Has mother in town that helps him sometimes.    Came to ER yesterday for URI symptoms, glucose found to be >400.    Past Medical History:   Diagnosis Date    Anxiety     BIPOLAR    ASTHMA     Bipolar 1 disorder ([facility])     Depression     Diabetes ([facility])     Type II Diabetes    Fall     passed out 2 wks ago    Glaucoma     Glaucoma 1982    both eyes/ blind on left eye    Hypothyroidism     Indigestion     once in a while    Mental disorder     learning disabilities; speech impairment; developmental delays    Murmur     since birth    Pneumonia     remote    Psychiatric problem 2002    PTSD    S/P thyroidectomy     Seizure ([facility]) 2010    Seizure disorder ([facility])     Unspecified disorder of thyroid      Past Surgical History:   Procedure Laterality Date    EYE SURGERY      OTHER      Hernia Repair when he was 10 yrs old    THYROID LOBECTOMY       No current facility-administered medications on file prior to encounter.     Current Outpatient Medications on File Prior to Encounter   Medication Sig Dispense Refill    albuterol 108 (90 Base) MCG/ACT Aero Soln inhalation aerosol Inhale 2 Puffs every 6 hours as needed (cough). 8.5 g 0    levothyroxine (SYNTHROID) 200 MCG Tab Take 225 mcg by mouth every morning on an empty stomach.      divalproex (DEPAKOTE) 500 MG Tablet Delayed Response Take 500 mg by mouth every morning.      divalproex (DEPAKOTE) 500 MG Tablet Delayed Response Take 1,500 mg by mouth at bedtime.      divalproex (DEPAKOTE) 250 MG Tablet Delayed Response Take 2 Tablets by mouth every morning. 30 Tablet 0  "   divalproex (DEPAKOTE) 250 MG Tablet Delayed Response Take 6 Tablets by mouth every evening. 90 Tablet 0    insulin glargine 100 UNIT/ML SC SOPN injection Inject 18 Units under the skin every day for 30 days. 6 mL 0    insulin lispro 100 UNIT/ML SC SOPN injection PEN Inject 2-12 Units under the skin 4 Times a Day,Before Meals and at Bedtime for 30 days. 15 mL 0    Alcohol Swabs Wipe site with prep pad prior to injection. 100 Each 0    glucose blood strip Use one strip to test blood sugar three times daily before meals. 100 Strip 0    Insulin Pen Needle 32 G x 4 mm Use one pen needle in pen device to inject insulin four times daily. 100 Each 0    Lancets Use one lancet to test blood sugar three times daily before meals. 100 Each 0    budesonide-formoterol (SYMBICORT) 160-4.5 MCG/ACT Aerosol Inhale 2 Puffs 2 times a day. 10.2 g 0    Blood Glucose Monitoring Suppl (BLOOD GLUCOSE SYSTEM SONDRA) Kit Test blood sugar as recommended by provider. 1 Kit 0     Allergies   Allergen Reactions    Abilify      \"Feeling tired, like I don't even know whats going on around me\"    Fish      Pt reports salmon causes him to be sick to his stomach  Not listed on MAR noted 2/3/2021      Geodon [Ziprasidone Hcl] Anaphylaxis     Anaphylaxis per patient    Aripiprazole      \"I became lethargic.\"    Hydroxyzine Itching     Pt will only state \"I feel itchy when I take it    Ziprasidone      Family and Occupational History     Socioeconomic History    Marital status: Single     Spouse name: Not on file    Number of children: Not on file    Years of education: Not on file    Highest education level: Not on file   Occupational History    Not on file         O:  /74   Pulse 69   Temp 36 °C (96.8 °F) (Temporal)   Resp 17   Ht 1.981 m (6' 6\")   Wt 110 kg (242 lb)   SpO2 94%   Gen: interactive and appropriate  Pulm: Breathing comfortably on RA without stridor or stertor  Face: symmetric, no edema, facial strength intact " bilaterally  OC/OP: clear, no masses. Dentition intact. Posterior pharynx easily visible. Floor of mouth soft and flat  Neck: 3-4cm anterior neck mass between thyroid and hyoid cartilage, nontender, no overlying cellulitic changes  Lymph: no palpable lymphadenopathy in lateral neck or parotid  Neuro: cranial nerves grossly intact bilaterally. Mood appropriate      Recent Labs     08/13/23  0653   WBC 10.7   RBC 3.58*   HEMOGLOBIN 10.9*   HEMATOCRIT 31.8*   MCV 88.8   MCH 30.4   MCHC 34.3   RDW 42.1   PLATELETCT 228   MPV 11.0     Recent Labs     08/13/23  0653   SODIUM 131*   POTASSIUM 4.0   CHLORIDE 93*   CO2 25   GLUCOSE 443*   BUN 11   CREATININE 0.86   CALCIUM 8.8         CT reviewed showing complex anterior neck mass, +lower small lymph node in central neck. No normal thyroid tissue visualized.      A/P:Norm Prabhakar is a 43 y.o. male with enlarging anterior neck mass. Had prior thyroidectomy. Mass appears likely of thyroid origin, ?thyroglossal duct cyst vs thyroid malignancy, other non-thyroid masses possible as well. Recommend FNA to further characterize mass. Surgery likely the next step for removal, but unclear if this would be appropriate to be done while inpatient. Will need to re-evaluate after FNA. Also has current URI and DM is out of control. Appreciate hospitalist management, particularly in managing hypothyroidism, agree should be on supplements.    Thank you for the consultation. Please call with questions or concerns.    nAdria Combs M.D.  [phone]

## 2023-08-13 NOTE — H&P
Hospital Medicine History & Physical Note    Date of Service  8/13/2023    Primary Care Physician  Pcp Pt States None    Consultants  ENT    Code Status  Full Code    Chief Complaint  Chief Complaint   Patient presents with    Cough     Cough and congestion x 3 days, rib pain from coughing, states inhaler he was prescribed he lost this evening       History of Presenting Illness  Norm Prabhakar is a 43 y.o. male who presented 8/13/2023 with past medical history of asthma, bipolar disorder, depression, diabetes, glaucoma, hypothyroidism, seizure disorders, is coming today complaining of increasing cough, as per patient cough started 4 days ago, cough is dry, patient denies any fever or chills or diaphoresis but complaining of generalized malaise, patient denies any ill contacts although he lives at the shelter, patient denies any chest pain, no wheezing no stridor, patient denies any focal weakness numbness tingling, patient has history of diabetes and he is on long-acting insulin and Premeal insulin, patient stated that he has been taking his medications regularly, also he is on antiseizure medications, patient in the emergency room was found to have normal WBC, hemoglobin 10.9, mild hyponatremia 131, chloride 93, glucose 443, hypomagnesemia with magnesium 1.7 patient has elevated beta hydroxybutyrate acid but normal bicarb at this time patient has received subcu insulin and he is getting IV fluids, will continue monitoring BMP every 4 hours, patient also is getting rapid strep test, checking for procalcitonin, will check for viral infection, patient also has a anterior neck mass that is suspicious for malignancy seen on previous CT, patient denies any difficulty swallowing and no stridor, no other masses or lymphadenopathy in his neck, ER physician discussed case with ENT and also with IR and plan is to hopefully get biopsy tomorrow Monday since patient has not been able to get this biopsy done as outpatient and  there is high risk of malignancy and high risk for patient being lost in follow-up, patient is alert and oriented he is able to give good information he is able to speak in full sentences, patient has been admitted in guarded condition, questions been answered.        Review of Systems  Review of Systems   Constitutional:  Positive for malaise/fatigue. Negative for chills and fever.   HENT:  Positive for sore throat.         Anterior neck mass.    Eyes:  Negative for blurred vision and double vision.   Respiratory:  Positive for cough. Negative for hemoptysis, wheezing and stridor.    Cardiovascular:  Negative for chest pain, palpitations, claudication, leg swelling and PND.   Gastrointestinal:  Negative for heartburn, nausea and vomiting.   Genitourinary:  Negative for hematuria and urgency.   Musculoskeletal:  Negative for back pain and myalgias.   Skin:  Negative for rash.   Neurological:  Negative for dizziness and headaches.   Endo/Heme/Allergies:  Does not bruise/bleed easily.   Psychiatric/Behavioral:  Negative for depression.        Past Medical History   has a past medical history of Anxiety, ASTHMA, Bipolar 1 disorder ([facility]), Depression, Diabetes ([facility]), Fall, Glaucoma, Glaucoma (1982), Hypothyroidism, Indigestion, Mental disorder, Murmur, Pneumonia, Psychiatric problem (2002), S/P thyroidectomy, Seizure ([facility]) (2010), Seizure disorder ([facility]), and Unspecified disorder of thyroid.    Surgical History   has a past surgical history that includes eye surgery; thyroid lobectomy; and other.     Family History  family history includes Heart Disease in his mother; Hypertension in his mother; Lung Disease in his mother; Stroke in his maternal grandmother.     Social History   reports that he quit smoking about 3 years ago. His smoking use included cigarettes and cigars. He smoked an average of .25 packs per day. He has never used smokeless tobacco. He reports current alcohol use. He reports current drug use. Drug:  "Inhaled.    Allergies  Allergies   Allergen Reactions    Abilify      \"Feeling tired, like I don't even know whats going on around me\"    Fish      Pt reports salmon causes him to be sick to his stomach  Not listed on MAR noted 2/3/2021      Geodon [Ziprasidone Hcl] Anaphylaxis     Anaphylaxis per patient    Aripiprazole      \"I became lethargic.\"    Hydroxyzine Itching     Pt will only state \"I feel itchy when I take it    Ziprasidone        Medications  Prior to Admission Medications   Prescriptions Last Dose Informant Patient Reported? Taking?   Alcohol Swabs   No No   Sig: Wipe site with prep pad prior to injection.   Blood Glucose Monitoring Suppl (BLOOD GLUCOSE SYSTEM SONDRA) Kit   No No   Sig: Test blood sugar as recommended by provider.   Insulin Pen Needle 32 G x 4 mm   No No   Sig: Use one pen needle in pen device to inject insulin four times daily.   Lancets   No No   Sig: Use one lancet to test blood sugar three times daily before meals.   albuterol 108 (90 Base) MCG/ACT Aero Soln inhalation aerosol   No No   Sig: Inhale 2 Puffs every 6 hours as needed (cough).   budesonide-formoterol (SYMBICORT) 160-4.5 MCG/ACT Aerosol   No No   Sig: Inhale 2 Puffs 2 times a day.   divalproex (DEPAKOTE) 250 MG Tablet Delayed Response   No No   Sig: Take 2 Tablets by mouth every morning.   divalproex (DEPAKOTE) 250 MG Tablet Delayed Response   No No   Sig: Take 6 Tablets by mouth every evening.   divalproex (DEPAKOTE) 500 MG Tablet Delayed Response   Yes No   Sig: Take 500 mg by mouth every morning.   divalproex (DEPAKOTE) 500 MG Tablet Delayed Response   Yes No   Sig: Take 1,500 mg by mouth at bedtime.   glucose blood strip   No No   Sig: Use one strip to test blood sugar three times daily before meals.   insulin glargine 100 UNIT/ML SC SOPN injection   No No   Sig: Inject 18 Units under the skin every day for 30 days.   insulin lispro 100 UNIT/ML SC SOPN injection PEN   No No   Sig: Inject 2-12 Units under the skin 4 " Times a Day,Before Meals and at Bedtime for 30 days.   levothyroxine (SYNTHROID) 200 MCG Tab   Yes No   Sig: Take 225 mcg by mouth every morning on an empty stomach.      Facility-Administered Medications: None       Physical Exam  Temp:  [36 °C (96.8 °F)] 36 °C (96.8 °F)  Pulse:  [64-75] 75  Resp:  [18] 18  BP: (124)/(69-70) 124/69  SpO2:  [94 %-96 %] 96 %  Blood Pressure: 124/69   Temperature: 36 °C (96.8 °F)   Pulse: 75   Respiration: 18   Pulse Oximetry: 96 %       Physical Exam  Vitals and nursing note reviewed.   Constitutional:       General: He is in acute distress.      Appearance: Normal appearance. He is ill-appearing and toxic-appearing.   HENT:      Head: Normocephalic.      Nose: Nose normal.      Mouth/Throat:      Mouth: Mucous membranes are dry.   Eyes:      General: No scleral icterus.     Extraocular Movements: Extraocular movements intact.      Conjunctiva/sclera: Conjunctivae normal.      Comments: Left eye mild exophthalmos.   Neck:      Comments: Anterior neck mass, not tender, no erythema.  Cardiovascular:      Rate and Rhythm: Normal rate and regular rhythm.      Pulses: Normal pulses.      Heart sounds: Normal heart sounds.   Pulmonary:      Effort: Pulmonary effort is normal. No respiratory distress.      Breath sounds: Normal breath sounds. No wheezing.   Abdominal:      General: Bowel sounds are normal. There is no distension.      Tenderness: There is no abdominal tenderness. There is no guarding.   Musculoskeletal:         General: Normal range of motion.      Cervical back: Normal range of motion and neck supple. No rigidity.      Right lower leg: No edema.      Left lower leg: No edema.   Skin:     General: Skin is warm and dry.      Capillary Refill: Capillary refill takes less than 2 seconds.      Coloration: Skin is not jaundiced.   Neurological:      General: No focal deficit present.      Mental Status: He is alert and oriented to person, place, and time.      Cranial Nerves: No  cranial nerve deficit.      Motor: No weakness.   Psychiatric:         Mood and Affect: Mood normal.         Behavior: Behavior normal.         Laboratory:  Recent Labs     08/13/23  0653   WBC 10.7   RBC 3.58*   HEMOGLOBIN 10.9*   HEMATOCRIT 31.8*   MCV 88.8   MCH 30.4   MCHC 34.3   RDW 42.1   PLATELETCT 228   MPV 11.0     Recent Labs     08/13/23  0653   SODIUM 131*   POTASSIUM 4.0   CHLORIDE 93*   CO2 25   GLUCOSE 443*   BUN 11   CREATININE 0.86   CALCIUM 8.8     Recent Labs     08/13/23  0653   ALTSGPT 21   ASTSGOT 31   ALKPHOSPHAT 103*   TBILIRUBIN 0.3   GLUCOSE 443*         No results for input(s): NTPROBNP in the last 72 hours.      No results for input(s): TROPONINT in the last 72 hours.    Imaging:  DX-NECK FOR SOFT TISSUE   Final Result      Anterior neck mass as described on CT.      The airway appears midline and patent.      IR-FNA BIOPSY W/ FLUORO GUIDANCE    (Results Pending)       X-Ray:  My impression is: No infiltrates, effusion    Assessment/Plan:  Justification for Admission Status  I anticipate this patient is appropriate for observation status at this time because will require close monitoring and stabilization of his hyperglycemia, monitoring for signs and symptoms of DKA follow-up viral panel       * Neck mass- (present on admission)  Assessment & Plan  Seen on previous CT and on x-ray today  ENT was consulted by ER physician  Plan is for IR biopsy tomorrow  N.p.o. at midnight    Acute cough  Assessment & Plan  We will check chest x-ray  Check rapid strep test  Check procalcitonin  Check viral panel  Supportive treatment    Hyponatremia  Assessment & Plan  Mild likely due to dehydration and hyperglycemia  Continue IV fluids  Monitoring sodium levels    Primary hypertension- (present on admission)  Assessment & Plan  Not on any blood pressure medication right now  Continue monitoring blood pressure    Mixed hyperlipidemia- (present on admission)  Assessment & Plan  Lipid panel in a.m.  Will  probably start aspirin and statin after biopsy    Type 2 diabetes mellitus without complication, without long-term current use of insulin (HCC)- (present on admission)  Assessment & Plan  Uncontrolled diabetes  Started on long-acting insulin and sliding scale insulin  Check A1c      PTSD (post-traumatic stress disorder)- (present on admission)  Assessment & Plan  Monitoring    History of seizure- (present on admission)  Assessment & Plan  Continue home medication  Seizure precaution    Psychiatric problem- (present on admission)  Assessment & Plan  Monitoring    Anemia- (present on admission)  Assessment & Plan  Follow-up iron levels, ferritin, vitamin B12    Anxiety and depression- (present on admission)  Assessment & Plan  Monitoring    Postoperative hypothyroidism- (present on admission)  Assessment & Plan  History of lobectomy  Unclear if patient is taking his Synthroid, TSH is elevated and T4 is suppressed.  We will restart his thyroid supplements and make sure patient is taking his medication on an empty stomach    Glaucoma- (present on admission)  Assessment & Plan  Needs to follow-up with ophthalmology as outpatient    Asthma- (present on admission)  Assessment & Plan  Not in acute exacerbation  Continue RT per protocol  O2 per protocol        VTE prophylaxis: SCDs/TEDs        I have reviewed patient's CBC  I have reviewed patient's CMP  Reviewed patient's TSH/T4 levels  I have reviewed and interpreted patient's chest x-ray  I have discussed with ER physician  I have placed order to admit patient to medical floor for observation  I have reviewed patient's old records and imaging results  Monitoring frequent BMP due to concern for possible early DKA.

## 2023-08-13 NOTE — ASSESSMENT & PLAN NOTE
History of lobectomy  Unclear if patient is taking his Synthroid, TSH is elevated at 91.7 and T4 is suppressed.  We will restart his thyroid supplements and make sure patient is taking his medication on an empty stomach    8/14 CT with thyroid mass, s/p biopsy, results pending   Cont synthroid, will need repeat thyroid labs in 4-6 weeks

## 2023-08-13 NOTE — ASSESSMENT & PLAN NOTE
8/14 chest x-ray neg   rapid strep test neg  procalcitonin negative   Supportive treatment  I have added tessalon

## 2023-08-13 NOTE — ED TRIAGE NOTES
.  Chief Complaint   Patient presents with    Cough     Cough and congestion x 3 days, rib pain from coughing, states inhaler he was prescribed he lost this evening     .Pt is alert and oriented, speaking in full sentences, follows commands and responds appropriately to questions Resp are even and unlabored.  Skin pink warm and dry     Pt placed in lobby. Pt educated on triage process. Pt encouraged to alert staff for any changes.    Pt with elevated bs with ems and states he had a red juice for dinner

## 2023-08-13 NOTE — ED PROVIDER NOTES
ER Provider Note    Scribed for Adrian Huizar II, M.D., by Jarrett Morocho. 8/13/2023  4:34 AM    Primary Care Provider: Patient reports no primary care provider.     CHIEF COMPLAINT  Chief Complaint   Patient presents with    Cough     Cough and congestion x 3 days, rib pain from coughing, states inhaler he was prescribed he lost this evening     EXTERNAL RECORDS REVIEWED  Records were reviewed which showed that the patient was seen by [place] ED for hyperglycemia. Additionally, he was seen in this ED 4 days ago for cough    HPI/ROS  LIMITATION TO HISTORY   None  OUTSIDE HISTORIAN(S):  None    Norm Prabhakar is a 43 y.o. male with a history of asthma who presents to the ED complaining of cough onset 3 days ago. He reports that the cough has been persistent enough to now hurt his ribs. He reports associated congestion, shortness of breath, and sore throat, but denies any fevers or chills. He states that he has been trying to use his inhaler but with only mild alleviation. Additionally, he reports a history of diabetes.     He states that he has possibly enlarged thyroid but has been unable to obtain follow-up on it and reports associated trouble swallowing that has been keeping him up at night while at the [facility] even when he takes trazodone.     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    Hypothyroidism     Indigestion     once in a while    Mental disorder     learning disabilities; speech impairment; developmental delays    Murmur     since birth    Pneumonia     remote    Psychiatric problem 2002    PTSD    S/P thyroidectomy     Seizure ([facility]) 2010    Seizure disorder ([facility])     Unspecified disorder of thyroid      SURGICAL HISTORY  Past Surgical History:   Procedure  Laterality Date    EYE SURGERY      OTHER      Hernia Repair when he was 10 yrs old    THYROID LOBECTOMY       FAMILY HISTORY  Family History   Problem Relation Age of Onset    Hypertension Mother     Heart Disease Mother     Lung Disease Mother     Stroke Maternal Grandmother      SOCIAL HISTORY   reports that he quit smoking about 3 years ago. His smoking use included cigarettes and cigars. He smoked an average of .25 packs per day. He has never used smokeless tobacco. He reports current alcohol use. He reports current drug use. Drug: Inhaled.    CURRENT MEDICATIONS  Previous Medications    ALBUTEROL 108 (90 BASE) MCG/ACT AERO SOLN INHALATION AEROSOL    Inhale 2 Puffs every 6 hours as needed (cough).    ALCOHOL SWABS    Wipe site with prep pad prior to injection.    BLOOD GLUCOSE MONITORING SUPPL (BLOOD GLUCOSE SYSTEM SONDRA) KIT    Test blood sugar as recommended by provider.    BUDESONIDE-FORMOTEROL (SYMBICORT) 160-4.5 MCG/ACT AEROSOL    Inhale 2 Puffs 2 times a day.    DIVALPROEX (DEPAKOTE) 250 MG TABLET DELAYED RESPONSE    Take 2 Tablets by mouth every morning.    DIVALPROEX (DEPAKOTE) 250 MG TABLET DELAYED RESPONSE    Take 6 Tablets by mouth every evening.    DIVALPROEX (DEPAKOTE) 500 MG TABLET DELAYED RESPONSE    Take 500 mg by mouth every morning.    DIVALPROEX (DEPAKOTE) 500 MG TABLET DELAYED RESPONSE    Take 1,500 mg by mouth at bedtime.    GLUCOSE BLOOD STRIP    Use one strip to test blood sugar three times daily before meals.    INSULIN GLARGINE 100 UNIT/ML SC SOPN INJECTION    Inject 18 Units under the skin every day for 30 days.    INSULIN LISPRO 100 UNIT/ML SC SOPN INJECTION PEN    Inject 2-12 Units under the skin 4 Times a Day,Before Meals and at Bedtime for 30 days.    INSULIN PEN NEEDLE 32 G X 4 MM    Use one pen needle in pen device to inject insulin four times daily.    LANCETS    Use one lancet to test blood sugar three times daily before meals.    LEVOTHYROXINE (SYNTHROID) 200 MCG TAB    Take 225  "mcg by mouth every morning on an empty stomach.     ALLERGIES  Abilify, Fish, Geodon [ziprasidone hcl], Aripiprazole, Hydroxyzine, and Ziprasidone    PHYSICAL EXAM  /70   Pulse 75   Temp 36 °C (96.8 °F) (Temporal)   Resp 18   Ht 1.981 m (6' 6\")   Wt 110 kg (242 lb)   SpO2 96%      Physical Exam  Vitals and nursing note reviewed.   Constitutional:       Appearance: Normal appearance.   HENT:      Head: Normocephalic.      Mouth/Throat:      Mouth: Mucous membranes are moist.   Neck:      Comments: Firm mobile anterior midline mass. No stridor.   Cardiovascular:      Rate and Rhythm: Normal rate and regular rhythm.   Pulmonary:      Effort: Pulmonary effort is normal. No respiratory distress.      Comments: Diminished lung sounds bilaterally  Musculoskeletal:         General: No swelling. Normal range of motion.      Cervical back: Normal range of motion.   Lymphadenopathy:      Cervical: No cervical adenopathy.   Neurological:      General: No focal deficit present.      Mental Status: He is alert.           DIAGNOSTIC STUDIES    Radiology:   The attending emergency physician has independently interpreted the diagnostic imaging associated with this visit and am waiting the final reading from the radiologist.   Preliminary Interpretation:   Patent airway, anterior neck mass    Radiologist interpretation:   DX-NECK FOR SOFT TISSUE   Final Result      Anterior neck mass as described on CT.      The airway appears midline and patent.      IR-FNA BIOPSY W/ FLUORO GUIDANCE    (Results Pending)       CARE NARRATIVE & MEDICAL DECISION MAKING     ED Observation Status? Yes; I am placing the patient in to an observation status due to a diagnostic uncertainty as well as therapeutic intensity. Patient placed in observation status at 4:42 AM, 8/13/2023.     Observation plan is as follows: DX-Neck to evaluate air way.    Upon Reevaluation, the patient's condition has: not improved; and will be escalated to " hospitalization.    Patient discharged from ED Observation status at 8/13/2023 8:00 AM         4:34 AM - This is an emergent evaluation of a 43 y.o. male with history of diabetes, asthma who presents with concerns of a frequent cough. He believes its his asthma. No fever. No wheezing but slightly diminished bilaterally. He has obvious anterior neck mass that he has not yet followed up with ENT for 2 months since CT imaging. Very suspicious for neoplasm.  Will order duoneb for cough. XR to look at lateral neck to assess airway patency. And a POC glucose.     6:33 AM  Glucose 442. Ordered CBC, CMP, beta hydroxybutyric acid. I have spoken with Dr. Combs (ENT). We discussed importance of him getting a biopsy to better understand underlying etiology of mass. I was able to speak with Dr. Sorto (IR) who has approved him to have FNA/biopsy procedure done while inpatient. Will admit to hospitalist service unless labs consistent with DKA then he will need ICU for insulin infusion. Order for FNA placed, to be done on Monday.     Mild improvement in cough with duoneb and tessalon.     8:00 AM  Labs not consistent with DKA.  I spoke with Dr. Ponce who will facilitate admission.       PROBLEM LIST AND DISPOSITION    #Neck mass   -IR FNA tomorrow    #Hyperglycemia   -continued inpatient treatment    #Cough   -possible asthma, unclear etiology    I have discussed management of the patient with the following physicians and MANNY's: Buddy (Hospitalist), Garret (ENT), Noreen (IR)    Discussion of management with other QHP or appropriate source(s): RT for breathing treatment      Barriers to care at this time, including but not limited to: Patient does not have established PCP, Patient is homeless, and Patient lacks financial resources.         FINAL DIAGNOSIS  1. Neck mass    2. Hyperglycemia        The note accurately reflects work and decisions made by me.  Adrian Huizar II, M.D.  8/13/2023  8:01 AM    Jarrett ROB  (Scribe), am scribing for, and in the presence of, Adrian Huizar II, M.D.    Electronically signed by: Jarrett Morocho (Stephanie), 8/13/2023    IAdrian II, M.D., personally performed the services described in this documentation, as scribed by Jarrett Morocho in my presence, and it is both accurate and complete.

## 2023-08-13 NOTE — ASSESSMENT & PLAN NOTE
Not in acute exacerbation  Continue RT per protocol  O2 per protocol  cxr neg  Continue albuterol prn

## 2023-08-13 NOTE — ED NOTES
Bedside report received from OMID Best. Pt resting comfortably in bed with steady and unlabored breathing.

## 2023-08-13 NOTE — PROGRESS NOTES
4 Eyes Skin Assessment Completed by Earlene RN and OMID Bustos.    Head WDL  Ears WDL  Nose WDL  Mouth WDL  Neck WDL  Breast/Chest WDL  Shoulder Blades WDL  Spine WDL  (R) Arm/Elbow/Hand WDL  (L) Arm/Elbow/Hand WDL  Abdomen WDL  Groin WDL  Scrotum/Coccyx/Buttocks WDL  (R) Leg WDL  (L) Leg WDL  (R) Heel/Foot/Toe dry heels  (L) Heel/Foot/Toe dry heels          Devices In Places n/a       Interventions In Place N/A    Possible Skin Injury No    Pictures Uploaded Into Epic N/A  Wound Consult Placed N/A  RN Wound Prevention Protocol Ordered No

## 2023-08-13 NOTE — ASSESSMENT & PLAN NOTE
Seen on previous CT and on x-ray  CT showing large heterogenous mass, concerning for neoplasm, pt has been unable to get follow up care   IR biopsy done 8/14, path remains pending   ENT was consulted by ER physician, no formal consultation, may need to reach out pending path results

## 2023-08-13 NOTE — ED NOTES
Report given to floor RN. Pt transported with belongings to [address] via [facility].

## 2023-08-13 NOTE — RESPIRATORY CARE
COPD EDUCATION by COPD CLINICAL EDUCATOR  8/13/2023 at 3:32 PM by Marilou Díaz, RRT     Patient reviewed by COPD education team. Patient does not have a history or diagnosis of COPD and is a non-smoker.  Therefore, patient does not qualify for the COPD program.

## 2023-08-13 NOTE — ASSESSMENT & PLAN NOTE
Uncontrolled diabetes  Started on long-acting insulin and sliding scale insulin  Increased Lantus to 30 units 8/15  BG is improving, will monitor for additional 24 hours prior to making more changes today   Continue ISS, adjust as needed   May need further adjustments

## 2023-08-13 NOTE — ED NOTES
Med rec complete per pt and [facility]. [facility], most recent fills were on 7/15/23 for latuda and trazodone. On 6/13 pt filled keppra 250 mg 6 tabs once day- pt reports taking 500 mg in AM and 1500 mg at night. Per pt, he only fills at [facility]

## 2023-08-13 NOTE — ED NOTES
Brought breakfast tray to patient. Pt sitting up in bed eating breakfast, gurney in lowest position and call light in reach

## 2023-08-13 NOTE — ASSESSMENT & PLAN NOTE
8/14 hypertriglyceridemai  tg >783, LDL noncal  - start fibrate  - would benefit from statin given underlying DM and cholesterol, start atorvastatin 10mg, will titrate up pending tolerability

## 2023-08-13 NOTE — ED NOTES
Pt wheeled to the room via WC. Placed on SpO2 and BP monitors. Call light within reach. No further complaints at this time. Chart up for ERP.

## 2023-08-14 ENCOUNTER — APPOINTMENT (OUTPATIENT)
Dept: RADIOLOGY | Facility: MEDICAL CENTER | Age: 43
End: 2023-08-14
Attending: EMERGENCY MEDICINE
Payer: MEDICAID

## 2023-08-14 LAB
ALBUMIN SERPL BCP-MCNC: 3.8 G/DL (ref 3.2–4.9)
ALBUMIN/GLOB SERPL: 2 G/DL
ALP SERPL-CCNC: 93 U/L (ref 30–99)
ALT SERPL-CCNC: 21 U/L (ref 2–50)
ANION GAP SERPL CALC-SCNC: 10 MMOL/L (ref 7–16)
ANION GAP SERPL CALC-SCNC: 11 MMOL/L (ref 7–16)
AST SERPL-CCNC: 29 U/L (ref 12–45)
BILIRUB SERPL-MCNC: 0.3 MG/DL (ref 0.1–1.5)
BUN SERPL-MCNC: 10 MG/DL (ref 8–22)
BUN SERPL-MCNC: 10 MG/DL (ref 8–22)
CALCIUM ALBUM COR SERPL-MCNC: 9.2 MG/DL (ref 8.5–10.5)
CALCIUM SERPL-MCNC: 8.5 MG/DL (ref 8.5–10.5)
CALCIUM SERPL-MCNC: 9 MG/DL (ref 8.5–10.5)
CHLORIDE SERPL-SCNC: 100 MMOL/L (ref 96–112)
CHLORIDE SERPL-SCNC: 99 MMOL/L (ref 96–112)
CHOLEST SERPL-MCNC: 334 MG/DL (ref 100–199)
CO2 SERPL-SCNC: 27 MMOL/L (ref 20–33)
CO2 SERPL-SCNC: 27 MMOL/L (ref 20–33)
CREAT SERPL-MCNC: 0.9 MG/DL (ref 0.5–1.4)
CREAT SERPL-MCNC: 0.93 MG/DL (ref 0.5–1.4)
CYTOLOGY REG CYTOL: NORMAL
ERYTHROCYTE [DISTWIDTH] IN BLOOD BY AUTOMATED COUNT: 43.2 FL (ref 35.9–50)
GFR SERPLBLD CREATININE-BSD FMLA CKD-EPI: 106 ML/MIN/1.73 M 2
GFR SERPLBLD CREATININE-BSD FMLA CKD-EPI: 110 ML/MIN/1.73 M 2
GLOBULIN SER CALC-MCNC: 1.9 G/DL (ref 1.9–3.5)
GLUCOSE BLD STRIP.AUTO-MCNC: 193 MG/DL (ref 65–99)
GLUCOSE BLD STRIP.AUTO-MCNC: 241 MG/DL (ref 65–99)
GLUCOSE BLD STRIP.AUTO-MCNC: 242 MG/DL (ref 65–99)
GLUCOSE BLD STRIP.AUTO-MCNC: 265 MG/DL (ref 65–99)
GLUCOSE BLD STRIP.AUTO-MCNC: 342 MG/DL (ref 65–99)
GLUCOSE SERPL-MCNC: 249 MG/DL (ref 65–99)
GLUCOSE SERPL-MCNC: 303 MG/DL (ref 65–99)
HCT VFR BLD AUTO: 31.6 % (ref 42–52)
HDLC SERPL-MCNC: 37 MG/DL
HGB BLD-MCNC: 10.7 G/DL (ref 14–18)
LDLC SERPL CALC-MCNC: ABNORMAL MG/DL
MCH RBC QN AUTO: 30.6 PG (ref 27–33)
MCHC RBC AUTO-ENTMCNC: 33.9 G/DL (ref 32.3–36.5)
MCV RBC AUTO: 90.3 FL (ref 81.4–97.8)
PLATELET # BLD AUTO: 224 K/UL (ref 164–446)
PMV BLD AUTO: 11 FL (ref 9–12.9)
POTASSIUM SERPL-SCNC: 3.6 MMOL/L (ref 3.6–5.5)
POTASSIUM SERPL-SCNC: 4.5 MMOL/L (ref 3.6–5.5)
PROT SERPL-MCNC: 5.7 G/DL (ref 6–8.2)
RBC # BLD AUTO: 3.5 M/UL (ref 4.7–6.1)
SODIUM SERPL-SCNC: 137 MMOL/L (ref 135–145)
SODIUM SERPL-SCNC: 137 MMOL/L (ref 135–145)
TRIGL SERPL-MCNC: 782 MG/DL (ref 0–149)
WBC # BLD AUTO: 10.8 K/UL (ref 4.8–10.8)

## 2023-08-14 PROCEDURE — 76942 ECHO GUIDE FOR BIOPSY: CPT

## 2023-08-14 PROCEDURE — 96372 THER/PROPH/DIAG INJ SC/IM: CPT | Mod: XU

## 2023-08-14 PROCEDURE — 82962 GLUCOSE BLOOD TEST: CPT | Mod: 91

## 2023-08-14 PROCEDURE — 36415 COLL VENOUS BLD VENIPUNCTURE: CPT

## 2023-08-14 PROCEDURE — 80048 BASIC METABOLIC PNL TOTAL CA: CPT

## 2023-08-14 PROCEDURE — A9270 NON-COVERED ITEM OR SERVICE: HCPCS | Mod: UD | Performed by: INTERNAL MEDICINE

## 2023-08-14 PROCEDURE — 88112 CYTOPATH CELL ENHANCE TECH: CPT

## 2023-08-14 PROCEDURE — 85027 COMPLETE CBC AUTOMATED: CPT

## 2023-08-14 PROCEDURE — 96372 THER/PROPH/DIAG INJ SC/IM: CPT

## 2023-08-14 PROCEDURE — 94640 AIRWAY INHALATION TREATMENT: CPT

## 2023-08-14 PROCEDURE — 80061 LIPID PANEL: CPT

## 2023-08-14 PROCEDURE — 700101 HCHG RX REV CODE 250: Mod: UD | Performed by: INTERNAL MEDICINE

## 2023-08-14 PROCEDURE — 88305 TISSUE EXAM BY PATHOLOGIST: CPT

## 2023-08-14 PROCEDURE — A9270 NON-COVERED ITEM OR SERVICE: HCPCS | Mod: UD | Performed by: HOSPITALIST

## 2023-08-14 PROCEDURE — 80053 COMPREHEN METABOLIC PANEL: CPT

## 2023-08-14 PROCEDURE — 83036 HEMOGLOBIN GLYCOSYLATED A1C: CPT

## 2023-08-14 PROCEDURE — 92610 EVALUATE SWALLOWING FUNCTION: CPT

## 2023-08-14 PROCEDURE — 99233 SBSQ HOSP IP/OBS HIGH 50: CPT | Performed by: INTERNAL MEDICINE

## 2023-08-14 PROCEDURE — 88341 IMHCHEM/IMCYTCHM EA ADD ANTB: CPT | Mod: 91

## 2023-08-14 PROCEDURE — 700105 HCHG RX REV CODE 258: Mod: UD | Performed by: HOSPITALIST

## 2023-08-14 PROCEDURE — 88342 IMHCHEM/IMCYTCHM 1ST ANTB: CPT

## 2023-08-14 PROCEDURE — 700102 HCHG RX REV CODE 250 W/ 637 OVERRIDE(OP): Mod: UD | Performed by: HOSPITALIST

## 2023-08-14 PROCEDURE — 700102 HCHG RX REV CODE 250 W/ 637 OVERRIDE(OP): Mod: UD | Performed by: INTERNAL MEDICINE

## 2023-08-14 PROCEDURE — G0378 HOSPITAL OBSERVATION PER HR: HCPCS

## 2023-08-14 RX ORDER — ACETAMINOPHEN 325 MG/1
650 TABLET ORAL EVERY 6 HOURS PRN
Status: DISCONTINUED | OUTPATIENT
Start: 2023-08-14 | End: 2023-08-17 | Stop reason: HOSPADM

## 2023-08-14 RX ORDER — GUAIFENESIN/DEXTROMETHORPHAN 100-10MG/5
5 SYRUP ORAL EVERY 6 HOURS
Status: DISCONTINUED | OUTPATIENT
Start: 2023-08-14 | End: 2023-08-17 | Stop reason: HOSPADM

## 2023-08-14 RX ORDER — FENOFIBRATE 67 MG/1
67 CAPSULE ORAL DAILY
Status: DISCONTINUED | OUTPATIENT
Start: 2023-08-14 | End: 2023-08-17 | Stop reason: HOSPADM

## 2023-08-14 RX ADMIN — GUAIFENESIN SYRUP AND DEXTROMETHORPHAN 5 ML: 100; 10 SYRUP ORAL at 23:46

## 2023-08-14 RX ADMIN — SODIUM CHLORIDE: 9 INJECTION, SOLUTION INTRAVENOUS at 04:00

## 2023-08-14 RX ADMIN — SODIUM CHLORIDE 1000 ML: 9 INJECTION, SOLUTION INTRAVENOUS at 17:46

## 2023-08-14 RX ADMIN — ALBUTEROL SULFATE 2.5 MG: 2.5 SOLUTION RESPIRATORY (INHALATION) at 12:33

## 2023-08-14 RX ADMIN — INSULIN HUMAN 5 UNITS: 100 INJECTION, SOLUTION PARENTERAL at 08:38

## 2023-08-14 RX ADMIN — INSULIN HUMAN 3 UNITS: 100 INJECTION, SOLUTION PARENTERAL at 12:26

## 2023-08-14 RX ADMIN — INSULIN HUMAN 3 UNITS: 100 INJECTION, SOLUTION PARENTERAL at 20:31

## 2023-08-14 RX ADMIN — SENNOSIDES AND DOCUSATE SODIUM 2 TABLET: 50; 8.6 TABLET ORAL at 18:32

## 2023-08-14 RX ADMIN — GUAIFENESIN SYRUP AND DEXTROMETHORPHAN 5 ML: 100; 10 SYRUP ORAL at 19:16

## 2023-08-14 RX ADMIN — INSULIN HUMAN 2 UNITS: 100 INJECTION, SOLUTION PARENTERAL at 18:36

## 2023-08-14 RX ADMIN — GUAIFENESIN SYRUP AND DEXTROMETHORPHAN 5 ML: 100; 10 SYRUP ORAL at 13:10

## 2023-08-14 RX ADMIN — MOMETASONE FUROATE AND FORMOTEROL FUMARATE DIHYDRATE 2 PUFF: 200; 5 AEROSOL RESPIRATORY (INHALATION) at 07:30

## 2023-08-14 RX ADMIN — FENOFIBRATE 67 MG: 67 CAPSULE ORAL at 08:33

## 2023-08-14 RX ADMIN — MOMETASONE FUROATE AND FORMOTEROL FUMARATE DIHYDRATE 2 PUFF: 200; 5 AEROSOL RESPIRATORY (INHALATION) at 18:33

## 2023-08-14 RX ADMIN — BENZOCAINE AND MENTHOL 1 LOZENGE: 15; 3.6 LOZENGE ORAL at 12:26

## 2023-08-14 RX ADMIN — INSULIN GLARGINE-YFGN 25 UNITS: 100 INJECTION, SOLUTION SUBCUTANEOUS at 08:34

## 2023-08-14 RX ADMIN — DIVALPROEX SODIUM 1500 MG: 500 TABLET, DELAYED RELEASE ORAL at 20:28

## 2023-08-14 RX ADMIN — ACETAMINOPHEN 650 MG: 325 TABLET, FILM COATED ORAL at 18:32

## 2023-08-14 RX ADMIN — BENZOCAINE AND MENTHOL 1 LOZENGE: 15; 3.6 LOZENGE ORAL at 18:32

## 2023-08-14 ASSESSMENT — COPD QUESTIONNAIRES
DURING THE PAST 4 WEEKS HOW MUCH DID YOU FEEL SHORT OF BREATH: SOME OF THE TIME
COPD SCREENING SCORE: 3
HAVE YOU SMOKED AT LEAST 100 CIGARETTES IN YOUR ENTIRE LIFE: YES
DO YOU EVER COUGH UP ANY MUCUS OR PHLEGM?: NO/ONLY WITH OCCASIONAL COLDS OR INFECTIONS

## 2023-08-14 ASSESSMENT — ENCOUNTER SYMPTOMS
MEMORY LOSS: 0
SPUTUM PRODUCTION: 0
CONSTIPATION: 0
WEAKNESS: 1
CHILLS: 0
HEARTBURN: 0
ABDOMINAL PAIN: 0
DIARRHEA: 0
DIZZINESS: 0
HEADACHES: 0
SPEECH CHANGE: 0
MYALGIAS: 1
FOCAL WEAKNESS: 0
COUGH: 1
SHORTNESS OF BREATH: 0
NERVOUS/ANXIOUS: 0
FLANK PAIN: 0
NAUSEA: 0
VOMITING: 0
SENSORY CHANGE: 0
DEPRESSION: 0
PALPITATIONS: 0
BACK PAIN: 0
FEVER: 0
STRIDOR: 0
WHEEZING: 0

## 2023-08-14 ASSESSMENT — PAIN DESCRIPTION - PAIN TYPE
TYPE: ACUTE PAIN

## 2023-08-14 NOTE — PROGRESS NOTES
Pt arrived from IR 1030, AxO4, on 2L NC. Pt comfortable in bed, dressing clean dry and intact, no drainage. Pt restin in room, bed locked in lowest position, call light within reach, no needs at this time.

## 2023-08-14 NOTE — PROGRESS NOTES
Patient to CT4  on hospital bed. Patient consented at bedside by6 Dr. Sorto, confirmed with this RN, all questions answered. US guided neck mass core biopsy done by Dr. Sorto; NON-SEDATION (no H&P required as this is NON SEDATION procedure) anterior access site, dressing CDI; four, FNA in 1 jar cytolyte.Hand delivered to pathology lab. Pt tolerated the procedure well. Pt hemodynamically stable pre/intra/post procedure; all questions and concerns answered prior to being sent by transport back to room in stable condition

## 2023-08-14 NOTE — OR SURGEON
Immediate Post- Operative Note        Findings: anterior neck mass      Procedure(s): USG FNA of same      Estimated Blood Loss: Less than 5 ml        Complications: None            8/14/2023     9:57 AM     Johny Sorto M.D.

## 2023-08-14 NOTE — DISCHARGE PLANNING
Case Management Discharge Planning    Admission Date: 8/13/2023  GMLOS:    ALOS: 0    6-Clicks ADL Score: 24  6-Clicks Mobility Score: 22      Anticipated Discharge Dispo: Discharge Disposition: Discharged to home/self care (01)  Discharge Address: [facility]  Discharge Contact Phone Number: [phone]    DME Needed: No    Action(s) Taken: Updated Provider/Nurse on Discharge Plan and DC Assessment Complete (See below)    Discussed during rounds. Per Hospitalist, pt is not medically cleared. SLP was ordered, pending eval.     RN BOUCHRA spoke to pt at bedside. Per pt, he lives at the [facility]. Pt goes to [facility] for PCP. Pt does not remember PCP's name. Pt denies use of home O2 or other DMEs. Preferred pharmacy is [address]. Pt's emergency contact is his mother, Brandi ([phone]). Pt uses [employer] for transportation needs.  6 clicks scores are 24 and 22.     Escalations Completed: None    Medically Clear: No    Next Steps:   Follow up with treatment team for medical clearance and discharge barriers.    Barriers to Discharge: Medical clearance    Is the patient up for discharge tomorrow: No          Care Transition Team Assessment    Information Source  Orientation Level: Oriented X4  Information Given By: Patient  Informant's Name: Norm Prabhakar  Who is responsible for making decisions for patient? : Patient       Elopement Risk  Legal Hold: No  Ambulatory or Self Mobile in Wheelchair: Yes  Disoriented: No  Psychiatric Symptoms: None  History of Wandering: No  Elopement this Admit: No  Vocalizing Wanting to Leave: No  Displays Behaviors, Body Language Wanting to Leave: No-Not at Risk for Elopement  Elopement Risk: Not at Risk for Elopement    Interdisciplinary Discharge Planning  Primary Care Physician: [facility]  Lives with - Patient's Self Care Capacity:  (Resides at [facility])  Patient or legal guardian wants to designate a caregiver: No  Support Systems:  / Social  Worker, Friends / Neighbors, Family Member(s)  Housing / Facility: Homeless  Name of Care Facility: [facility]  Durable Medical Equipment: Not Applicable    Discharge Preparedness  What is your plan after discharge?: Uncertain - pending medical team collaboration  Prior Functional Level: Independent with Activities of Daily Living, Independent with Medication Management    Functional Assesment  Prior Functional Level: Independent with Activities of Daily Living, Independent with Medication Management    Finances  Financial Barriers to Discharge: No  Prescription Coverage: Yes    Vision / Hearing Impairment  Vision Impairment : Yes  Right Eye Vision: Impaired, Wears Glasses  Left Eye Vision: Blind  Hearing Impairment : Yes  Hearing Impairment: Hearing Device Not Available, Both Ears  Does Pt Need Special Equipment for the Hearing Impaired?: No       Advance Directive  Advance Directive?: None  Advance Directive offered?: AD Booklet refused    Domestic Abuse  Have you ever been the victim of abuse or violence?: No  Physical Abuse or Sexual Abuse: No  Verbal Abuse or Emotional Abuse: No  Possible Abuse/Neglect Reported to:: Not Applicable    Psychological Assessment  History of Substance Abuse: None  History of Psychiatric Problems: No    Discharge Risks or Barriers  Discharge risks or barriers?: Complex medical needs, Homeless / couch surfing  Patient risk factors: Complex medical needs    Anticipated Discharge Information  Discharge Disposition: Discharged to home/self care (01)  Discharge Address: [facility]  Discharge Contact Phone Number: [phone]

## 2023-08-14 NOTE — PROGRESS NOTES
[facility] Medicine Daily Progress Note    Date of Service  8/14/2023    Chief Complaint  Norm Prabhakar is a 43 y.o. male admitted 8/13/2023 with neck mass with pain for over a year.    Hospital Course  Norm Prabhakar is a 43 y.o. male who presented 8/13/2023 with past medical history of asthma, bipolar disorder, depression, diabetes, glaucoma, hypothyroidism, seizure disorders, is coming today complaining of increasing cough, as per patient cough started 4 days ago, cough is dry, patient denies any fever or chills or diaphoresis but complaining of generalized malaise, patient denies any ill contacts although he lives at the shelter, patient denies any chest pain, no wheezing no stridor, patient denies any focal weakness numbness tingling, patient has history of diabetes and he is on long-acting insulin and Premeal insulin, patient stated that he has been taking his medications regularly, also he is on antiseizure medications, patient in the emergency room was found to have normal WBC, hemoglobin 10.9, mild hyponatremia 131, chloride 93, glucose 443, hypomagnesemia with magnesium 1.7 patient has elevated beta hydroxybutyrate acid but normal bicarb at this time patient has received subcu insulin and he is getting IV fluids, will continue monitoring BMP every 4 hours, patient also is getting rapid strep test, checking for procalcitonin, will check for viral infection, patient also has a anterior neck mass that is suspicious for malignancy seen on previous CT, patient denies any difficulty swallowing and no stridor, no other masses or lymphadenopathy in his neck, ER physician discussed case with ENT and also with IR and plan is to hopefully get biopsy tomorrow Monday since patient has not been able to get this biopsy done as outpatient and there is high risk of malignancy and high risk for patient being lost in follow-up, patient is alert and oriented he is able to give good information he is able to speak in full  sentences, patient has been admitted in guarded condition, questions been answered.    Interval Problem Update  8/14 status post biopsy of neck mass  Reports neck pain with cough, no shortness of breath or sputum production  Patient is from [facility], patient's mother assists with medical decision making    I have discussed this patient's plan of care and discharge plan at IDT rounds today with Case Management, Nursing, Nursing leadership, and other members of the IDT team.    Consultants/Specialty  ENT    Code Status  Full Code    Disposition  The patient is not medically cleared for discharge to home or a post-acute facility.      I have placed the appropriate orders for post-discharge needs.    Review of Systems  Review of Systems   Constitutional:  Positive for malaise/fatigue. Negative for chills and fever.   HENT:  Negative for congestion and hearing loss.         Neck pain and swelling   Respiratory:  Positive for cough. Negative for sputum production, shortness of breath, wheezing and stridor.    Cardiovascular:  Negative for chest pain, palpitations and leg swelling.   Gastrointestinal:  Negative for abdominal pain, constipation, diarrhea, heartburn, nausea and vomiting.   Genitourinary:  Negative for dysuria and flank pain.   Musculoskeletal:  Positive for myalgias. Negative for back pain and joint pain.   Neurological:  Positive for weakness. Negative for dizziness, sensory change, speech change, focal weakness and headaches.   Psychiatric/Behavioral:  Negative for depression and memory loss. The patient is not nervous/anxious.         Physical Exam  Temp:  [36.3 °C (97.3 °F)-36.6 °C (97.9 °F)] 36.6 °C (97.8 °F)  Pulse:  [44-61] 44  Resp:  [18-20] 18  BP: (104-112)/(60-77) 107/65  SpO2:  [94 %-97 %] 97 %    Physical Exam  Vitals and nursing note reviewed.   Constitutional:       General: He is not in acute distress.     Appearance: He is ill-appearing. He is not toxic-appearing or diaphoretic.   HENT:       Head: Normocephalic and atraumatic.      Nose: Nose normal.      Mouth/Throat:      Mouth: Mucous membranes are moist.   Eyes:      General:         Right eye: No discharge.         Left eye: No discharge.      Extraocular Movements: Extraocular movements intact.      Pupils: Pupils are equal, round, and reactive to light.   Neck:      Thyroid: No thyromegaly.      Vascular: No JVD.      Comments: Midline neck mass, tenderness to palpation  Cardiovascular:      Rate and Rhythm: Normal rate.      Heart sounds: No murmur heard.  Pulmonary:      Effort: Pulmonary effort is normal. No respiratory distress.      Breath sounds: Normal breath sounds. No stridor. No wheezing or rhonchi.   Abdominal:      General: Bowel sounds are normal. There is no distension.      Palpations: Abdomen is soft. There is no mass.      Tenderness: There is no abdominal tenderness.      Hernia: No hernia is present.   Musculoskeletal:         General: No swelling.      Cervical back: Neck supple. Tenderness present. No rigidity.   Skin:     General: Skin is warm and dry.      Coloration: Skin is not pale.      Findings: No erythema or rash.   Neurological:      Mental Status: He is alert and oriented to person, place, and time.      Cranial Nerves: No cranial nerve deficit.      Sensory: No sensory deficit.      Motor: Weakness present.   Psychiatric:         Behavior: Behavior normal.         Thought Content: Thought content normal.         Fluids  No intake or output data in the 24 hours ending 08/14/23 1250      Laboratory  Recent Labs     08/13/23  0653 08/14/23  0021   WBC 10.7 10.8   RBC 3.58* 3.50*   HEMOGLOBIN 10.9* 10.7*   HEMATOCRIT 31.8* 31.6*   MCV 88.8 90.3   MCH 30.4 30.6   MCHC 34.3 33.9   RDW 42.1 43.2   PLATELETCT 228 224   MPV 11.0 11.0     Recent Labs     08/13/23  1816 08/14/23  0021 08/14/23  0609   SODIUM 134* 137 137   POTASSIUM 4.0 3.6 4.5   CHLORIDE 99 99 100   CO2 23 27 27   GLUCOSE 346* 303* 249*   BUN 10 10 10    CREATININE 0.79 0.90 0.93   CALCIUM 8.5 9.0 8.5             Recent Labs     08/14/23  0021   TRIGLYCERIDE 782*   HDL 37*   LDL see below       Imaging  IR-FNA BIOPSY W/ FLUORO GUIDANCE   Final Result      Successful ultrasound-guided anterior neck mass fine-needle aspiration.      DX-CHEST-LIMITED (1 VIEW)   Final Result         No acute cardiac or pulmonary abnormality is identified.      DX-NECK FOR SOFT TISSUE   Final Result      Anterior neck mass as described on CT.      The airway appears midline and patent.      DX-ESOPHAGUS - [id number]-CB    (Results Pending)        Assessment/Plan  * Neck mass- (present on admission)  Assessment & Plan  Seen on previous CT and on x-ray today  ENT was consulted by ER physician    8/14 Plan is for IR biopsy  N.p.o. , f/u path    Acute cough  Assessment & Plan  8/14 chest x-ray neg   rapid strep test neg  Check procalcitonin  Check viral panel  Supportive treatment    Hyponatremia  Assessment & Plan  Mild likely due to dehydration and hyperglycemia  Continue IV fluids  Monitoring sodium levels    Primary hypertension- (present on admission)  Assessment & Plan  Not on any blood pressure medication right now  Continue monitoring blood pressure    Mixed hyperlipidemia- (present on admission)  Assessment & Plan    8/14 hypertriglyceridemai  tg >783, LDL noncal  - start fibrate   after biopsy    Type 2 diabetes mellitus without complication, without long-term current use of insulin (HCC)- (present on admission)  Assessment & Plan  Uncontrolled diabetes  Started on long-acting insulin and sliding scale insulin    8/14 f/u A1c  Adjust lantus, npo      PTSD (post-traumatic stress disorder)- (present on admission)  Assessment & Plan  Monitoring    History of seizure- (present on admission)  Assessment & Plan  Continue home medication  Seizure precaution    Psychiatric problem- (present on admission)  Assessment & Plan  Monitoring    Anemia- (present on admission)  Assessment &  Plan  8/14 f iron levels, ferritin- wnl   vitamin B12    Anxiety and depression- (present on admission)  Assessment & Plan  Monitoring    Postoperative hypothyroidism- (present on admission)  Assessment & Plan  History of lobectomy  Unclear if patient is taking his Synthroid, TSH is elevated and T4 is suppressed.  We will restart his thyroid supplements and make sure patient is taking his medication on an empty stomach    8/14 CT with thyroid mass, npo for IR bx  Cont synthroid    Glaucoma- (present on admission)  Assessment & Plan  Needs to follow-up with ophthalmology as outpatient    Asthma- (present on admission)  Assessment & Plan  Not in acute exacerbation  Continue RT per protocol  O2 per protocol  cxr neg         VTE prophylaxis:   SCDs/TEDs      I have performed a physical exam and reviewed and updated ROS and Plan today (8/14/2023). In review of yesterday's note (8/13/2023), there are no changes except as documented above.

## 2023-08-14 NOTE — THERAPY
Speech Language Pathology   Clinical Swallow Evaluation     Patient Name: Norm Prabhakar  AGE:  43 y.o., SEX:  male  Medical Record #: [id number]  Date of Service: 8/14/2023      History of Present Illness  43 y.o. male with 1 year of enlarging neck mass. Had thyroid surgery as a teenager (believes it was benign). Neck mass becoming uncomfortable, can feel it when swallowing.     PMHx CVA, Asthma, bipolar disorder, depression, DM, glaucoma, hypothyroidism, seizure disorder    Last seen by service in June of 2023, rec RG/TN diet    CMHx Neck mass, acute cough    CXR 8/13/2023  No focal infiltrates or consolidations are identified in the lungs.  No pleural fluid collections are identified.      General Information:  Vitals  O2 (LPM): 2  O2 Delivery Device: Nasal Cannula  Level of Consciousness: Alert, Awake  Orientation: Oriented x 4  Follows Directives: Yes      Prior Living Situation & Level of Function:  Housing / Facility: Homeless  Lives with - Patient's Self Care Capacity:  (Resides at [facility])  Communication: WFL  Swallowing: Patient reported difficulty swallowing ~4days prior to hospital admission. Pt endorsed preference for softer foods at that time.       Oral Mechanism Evaluation:  Dentition: Fair, Natural dentition   Facial Symmetry: Central right facial droop  Facial Sensation: Impaired - right     Labial Observations: WFL   Lingual Observations: Midline  Motor Speech: Dysarthria ?baseline          Laryngeal Function:  Secretion Management: Adequate  Voice Quality:  (cul-de-sac resonance)  Cough: Perceptually WNL       Subjective  Patient cleared by RN for evaluation. Pt fully cooperated with SLP tasks.      Assessment  Current Method of Nutrition: NPO until cleared by speech pathology  Positioning: Shell's (60-90 degrees)  Bolus Administration: Patient  O2 (LPM): 2 O2 Delivery Device: Nasal Cannula  Factor(s) Affecting Performance: None  Tracheostomy : No          Swallowing Trials:  Swallowing  "Trials  Ice: WFL  Liquidised (LQ3): WFL  Regular (RG7): Impaired      Comments: Patient adequately self-feeding with appropriate rate and volume of intake.  Adequate oral bolus acceptance and containment. Pharyngeal swallow response appeared timely. Intermittent throat clear with trials of thin liquids. One to two swallows completed per bolus. Reports of globus sensation with solids > thin consistencies. Provided education regarding dysphagia risk factors and indication for instrumental swallow study. Education provided MBSS; patient agreeable.          Clinical Impressions  Patient presents with clinical indicators of airway invasion and pharyngeal inefficiency, concerning for pharyngeal dysphagia. Mild dysarthria appreciated. Presentation is consistent with neck mass. A swallowing diagnostic is indicated to guide dysphagia management.       Recommendations  Diet Consistency: Soft/bite sized solids (SB6), thin liquids (TN0)  Instrumentation: VFSS (MBSS)  Medication: As tolerated  Supervision: Independent  Positioning: Fully upright and midline during oral intake  Risk Management : Alternate bites and sips  Oral Care: BID         SLP Treatment Plan  Treatment Plan: Dysphagia Treatment, Patient/Family/Caregiver Training  SLP Frequency: 3x Per Week  Estimated Duration: Until Therapy Goals Met      Anticipated Discharge Needs  Discharge Recommendations: Other (TBD pending diagnostic swallow study)   Therapy Recommendations Upon DC: Dysphagia Training, Patient / Family / Caregiver Education        Patient / Family Goals  Patient / Family Goal #1: \"that feels good\" - TN0  Short Term Goals  Short Term Goal # 1: Patient will consume a diet of soft/bite sized solids and thin liquids with no overt s/sx of aspiration  Short Term Goal # 2: Patient will participate in diagnostic swallow study to objectively assess swallow functiona and further POC      Yesenia Fang MS,CCC-SLP    "

## 2023-08-14 NOTE — PROGRESS NOTES
Report received from NOC RN and assumed patient care at 0700. Patient is A&Ox 4, on 2L NC.  Patient sleeping comfortably. Call light within reach and bed in lowest position. Plan of care discussed and patient does not have any further needs at this time.

## 2023-08-14 NOTE — PROGRESS NOTES
Spiritual Care Note    Patient's Name: Norm Prabhakar   MRN: [id number]    YOB: 1980   Age and Gender: 43 y.o. male   Unit/Service Area: [place] [address] Medical   Room (and Bed): S530/02   Ethnicity or Nationality:     Primary Language: English   Christian/Spiritual Preference: Scientologist   Place of Residence: [place], [place]   Code Status: Full Code    Date of Admission: 8/13/2023   Length of Stay: 0 days        Nature of the Visit:   Initial, On shift    General Visit:   Yes    Referral from/Origin of Request:   [facility] nursing order    Visited with:   Patient    Interaction/Conversation:   Patient politely declined a spiritual care visit    Plan:   Visit Upon Request      Spiritual Care Provider   Chaplain JAZMINE Aguilar  [phone]   [email or web address]

## 2023-08-14 NOTE — THERAPY
Speech Language Therapy Contact Note    Patient Name: Norm Prabhakar  Age:  43 y.o., Sex:  male  Medical Record #: [id number]  Today's Date: 8/14/2023    Discussed missed therapy with Sybil.        08/14/23 0846   Treatment Variance   Reason For Missed Therapy Medical - Other (Please Comment)   Initial Contact Note    Initial Contact Note  Order Received and Verified, Speech Therapy Evaluation in Progress with Full Report to Follow.   Interdisciplinary Plan of Care Collaboration   IDT Collaboration with  Nursing   Collaboration Comments Orders received for swallow evaluation. Per RN, patient NPO for biopsy - not yet scheduled. RN to follow up.

## 2023-08-15 ENCOUNTER — PATIENT MESSAGE (OUTPATIENT)
Dept: MEDICAL GROUP | Facility: CLINIC | Age: 43
End: 2023-08-15
Payer: MEDICAID

## 2023-08-15 ENCOUNTER — APPOINTMENT (OUTPATIENT)
Dept: RADIOLOGY | Facility: MEDICAL CENTER | Age: 43
End: 2023-08-15
Attending: INTERNAL MEDICINE
Payer: MEDICAID

## 2023-08-15 LAB
ALBUMIN SERPL BCP-MCNC: 4.1 G/DL (ref 3.2–4.9)
ALBUMIN/GLOB SERPL: 2.2 G/DL
ALP SERPL-CCNC: 90 U/L (ref 30–99)
ALT SERPL-CCNC: 26 U/L (ref 2–50)
ANION GAP SERPL CALC-SCNC: 11 MMOL/L (ref 7–16)
AST SERPL-CCNC: 32 U/L (ref 12–45)
BASOPHILS # BLD AUTO: 0.8 % (ref 0–1.8)
BASOPHILS # BLD: 0.09 K/UL (ref 0–0.12)
BILIRUB SERPL-MCNC: 0.3 MG/DL (ref 0.1–1.5)
BUN SERPL-MCNC: 10 MG/DL (ref 8–22)
CALCIUM ALBUM COR SERPL-MCNC: 8.9 MG/DL (ref 8.5–10.5)
CALCIUM SERPL-MCNC: 9 MG/DL (ref 8.5–10.5)
CHLORIDE SERPL-SCNC: 100 MMOL/L (ref 96–112)
CO2 SERPL-SCNC: 28 MMOL/L (ref 20–33)
CREAT SERPL-MCNC: 0.96 MG/DL (ref 0.5–1.4)
EOSINOPHIL # BLD AUTO: 0.24 K/UL (ref 0–0.51)
EOSINOPHIL NFR BLD: 2 % (ref 0–6.9)
ERYTHROCYTE [DISTWIDTH] IN BLOOD BY AUTOMATED COUNT: 45.1 FL (ref 35.9–50)
EST. AVERAGE GLUCOSE BLD GHB EST-MCNC: 364 MG/DL
GFR SERPLBLD CREATININE-BSD FMLA CKD-EPI: 102 ML/MIN/1.73 M 2
GLOBULIN SER CALC-MCNC: 1.9 G/DL (ref 1.9–3.5)
GLUCOSE BLD STRIP.AUTO-MCNC: 201 MG/DL (ref 65–99)
GLUCOSE BLD STRIP.AUTO-MCNC: 237 MG/DL (ref 65–99)
GLUCOSE BLD STRIP.AUTO-MCNC: 247 MG/DL (ref 65–99)
GLUCOSE BLD STRIP.AUTO-MCNC: 260 MG/DL (ref 65–99)
GLUCOSE SERPL-MCNC: 132 MG/DL (ref 65–99)
HBA1C MFR BLD: 14.3 % (ref 4–5.6)
HCT VFR BLD AUTO: 34.2 % (ref 42–52)
HGB BLD-MCNC: 11.2 G/DL (ref 14–18)
IMM GRANULOCYTES # BLD AUTO: 0.17 K/UL (ref 0–0.11)
IMM GRANULOCYTES NFR BLD AUTO: 1.4 % (ref 0–0.9)
LYMPHOCYTES # BLD AUTO: 4.98 K/UL (ref 1–4.8)
LYMPHOCYTES NFR BLD: 41.5 % (ref 22–41)
MCH RBC QN AUTO: 30.3 PG (ref 27–33)
MCHC RBC AUTO-ENTMCNC: 32.7 G/DL (ref 32.3–36.5)
MCV RBC AUTO: 92.4 FL (ref 81.4–97.8)
MONOCYTES # BLD AUTO: 0.75 K/UL (ref 0–0.85)
MONOCYTES NFR BLD AUTO: 6.3 % (ref 0–13.4)
NEUTROPHILS # BLD AUTO: 5.77 K/UL (ref 1.82–7.42)
NEUTROPHILS NFR BLD: 48 % (ref 44–72)
NRBC # BLD AUTO: 0 K/UL
NRBC BLD-RTO: 0 /100 WBC (ref 0–0.2)
PLATELET # BLD AUTO: 258 K/UL (ref 164–446)
PMV BLD AUTO: 10.5 FL (ref 9–12.9)
POTASSIUM SERPL-SCNC: 3.5 MMOL/L (ref 3.6–5.5)
PROT SERPL-MCNC: 6 G/DL (ref 6–8.2)
RBC # BLD AUTO: 3.7 M/UL (ref 4.7–6.1)
SODIUM SERPL-SCNC: 139 MMOL/L (ref 135–145)
WBC # BLD AUTO: 12 K/UL (ref 4.8–10.8)

## 2023-08-15 PROCEDURE — A9270 NON-COVERED ITEM OR SERVICE: HCPCS | Mod: UD | Performed by: INTERNAL MEDICINE

## 2023-08-15 PROCEDURE — 82962 GLUCOSE BLOOD TEST: CPT | Mod: 91

## 2023-08-15 PROCEDURE — 80053 COMPREHEN METABOLIC PANEL: CPT

## 2023-08-15 PROCEDURE — 700102 HCHG RX REV CODE 250 W/ 637 OVERRIDE(OP): Mod: UD | Performed by: HOSPITALIST

## 2023-08-15 PROCEDURE — 99233 SBSQ HOSP IP/OBS HIGH 50: CPT | Performed by: STUDENT IN AN ORGANIZED HEALTH CARE EDUCATION/TRAINING PROGRAM

## 2023-08-15 PROCEDURE — 36415 COLL VENOUS BLD VENIPUNCTURE: CPT

## 2023-08-15 PROCEDURE — A9270 NON-COVERED ITEM OR SERVICE: HCPCS | Mod: JZ,UD | Performed by: STUDENT IN AN ORGANIZED HEALTH CARE EDUCATION/TRAINING PROGRAM

## 2023-08-15 PROCEDURE — A9270 NON-COVERED ITEM OR SERVICE: HCPCS | Mod: UD | Performed by: HOSPITALIST

## 2023-08-15 PROCEDURE — 96372 THER/PROPH/DIAG INJ SC/IM: CPT

## 2023-08-15 PROCEDURE — 700102 HCHG RX REV CODE 250 W/ 637 OVERRIDE(OP): Mod: UD | Performed by: INTERNAL MEDICINE

## 2023-08-15 PROCEDURE — 92611 MOTION FLUOROSCOPY/SWALLOW: CPT

## 2023-08-15 PROCEDURE — 85025 COMPLETE CBC W/AUTO DIFF WBC: CPT

## 2023-08-15 PROCEDURE — 74230 X-RAY XM SWLNG FUNCJ C+: CPT

## 2023-08-15 PROCEDURE — G0378 HOSPITAL OBSERVATION PER HR: HCPCS

## 2023-08-15 PROCEDURE — 700102 HCHG RX REV CODE 250 W/ 637 OVERRIDE(OP): Mod: JZ,UD | Performed by: STUDENT IN AN ORGANIZED HEALTH CARE EDUCATION/TRAINING PROGRAM

## 2023-08-15 RX ORDER — POTASSIUM CHLORIDE 20 MEQ/1
40 TABLET, EXTENDED RELEASE ORAL ONCE
Status: COMPLETED | OUTPATIENT
Start: 2023-08-15 | End: 2023-08-15

## 2023-08-15 RX ORDER — BENZONATATE 100 MG/1
100 CAPSULE ORAL 3 TIMES DAILY PRN
Status: DISCONTINUED | OUTPATIENT
Start: 2023-08-15 | End: 2023-08-17 | Stop reason: HOSPADM

## 2023-08-15 RX ORDER — ATORVASTATIN CALCIUM 10 MG/1
10 TABLET, FILM COATED ORAL EVERY EVENING
Status: DISCONTINUED | OUTPATIENT
Start: 2023-08-15 | End: 2023-08-17 | Stop reason: HOSPADM

## 2023-08-15 RX ADMIN — INSULIN HUMAN 3 UNITS: 100 INJECTION, SOLUTION PARENTERAL at 12:46

## 2023-08-15 RX ADMIN — POTASSIUM CHLORIDE 40 MEQ: 1500 TABLET, EXTENDED RELEASE ORAL at 09:07

## 2023-08-15 RX ADMIN — ATORVASTATIN CALCIUM 10 MG: 10 TABLET, FILM COATED ORAL at 18:41

## 2023-08-15 RX ADMIN — ACETAMINOPHEN 650 MG: 325 TABLET, FILM COATED ORAL at 20:33

## 2023-08-15 RX ADMIN — INSULIN HUMAN 3 UNITS: 100 INJECTION, SOLUTION PARENTERAL at 09:15

## 2023-08-15 RX ADMIN — MOMETASONE FUROATE AND FORMOTEROL FUMARATE DIHYDRATE 2 PUFF: 200; 5 AEROSOL RESPIRATORY (INHALATION) at 18:43

## 2023-08-15 RX ADMIN — DIVALPROEX SODIUM 1500 MG: 500 TABLET, DELAYED RELEASE ORAL at 20:25

## 2023-08-15 RX ADMIN — GUAIFENESIN SYRUP AND DEXTROMETHORPHAN 5 ML: 100; 10 SYRUP ORAL at 20:26

## 2023-08-15 RX ADMIN — LEVOTHYROXINE SODIUM 100 MCG: 0.1 TABLET ORAL at 06:22

## 2023-08-15 RX ADMIN — MOMETASONE FUROATE AND FORMOTEROL FUMARATE DIHYDRATE 2 PUFF: 200; 5 AEROSOL RESPIRATORY (INHALATION) at 06:23

## 2023-08-15 RX ADMIN — FENOFIBRATE 67 MG: 67 CAPSULE ORAL at 06:22

## 2023-08-15 RX ADMIN — ACETAMINOPHEN 650 MG: 325 TABLET, FILM COATED ORAL at 09:15

## 2023-08-15 RX ADMIN — DIVALPROEX SODIUM 500 MG: 500 TABLET, DELAYED RELEASE ORAL at 06:22

## 2023-08-15 RX ADMIN — RIVAROXABAN 10 MG: 10 TABLET, FILM COATED ORAL at 18:41

## 2023-08-15 RX ADMIN — LEVOTHYROXINE SODIUM 125 MCG: 0.12 TABLET ORAL at 06:21

## 2023-08-15 RX ADMIN — GUAIFENESIN SYRUP AND DEXTROMETHORPHAN 5 ML: 100; 10 SYRUP ORAL at 17:34

## 2023-08-15 RX ADMIN — GUAIFENESIN SYRUP AND DEXTROMETHORPHAN 5 ML: 100; 10 SYRUP ORAL at 09:06

## 2023-08-15 RX ADMIN — SENNOSIDES AND DOCUSATE SODIUM 2 TABLET: 50; 8.6 TABLET ORAL at 06:22

## 2023-08-15 RX ADMIN — INSULIN HUMAN 3 UNITS: 100 INJECTION, SOLUTION PARENTERAL at 18:06

## 2023-08-15 RX ADMIN — INSULIN HUMAN 5 UNITS: 100 INJECTION, SOLUTION PARENTERAL at 20:29

## 2023-08-15 ASSESSMENT — ENCOUNTER SYMPTOMS
CONSTIPATION: 0
DIARRHEA: 0
MEMORY LOSS: 0
BACK PAIN: 0
NAUSEA: 0
SHORTNESS OF BREATH: 0
FEVER: 0
SPUTUM PRODUCTION: 0
WEAKNESS: 1
CHILLS: 0
COUGH: 1
HEARTBURN: 0
PALPITATIONS: 0
SPEECH CHANGE: 0
DIZZINESS: 0
VOMITING: 0
SENSORY CHANGE: 0
HEADACHES: 0
MYALGIAS: 1
FLANK PAIN: 0
DEPRESSION: 0
NERVOUS/ANXIOUS: 0
STRIDOR: 0
FOCAL WEAKNESS: 0
WHEEZING: 0
ABDOMINAL PAIN: 0

## 2023-08-15 ASSESSMENT — PAIN DESCRIPTION - PAIN TYPE
TYPE: ACUTE PAIN
TYPE: ACUTE PAIN

## 2023-08-15 ASSESSMENT — FIBROSIS 4 INDEX: FIB4 SCORE: 1

## 2023-08-15 NOTE — THERAPY
Speech Language Pathology   Videofluoroscopic Swallow Study Evaluation     Patient Name: Norm Prabhakar  AGE:  43 y.o., SEX:  male  Medical Record #: [id number]  Date of Service: 8/15/2023      History of Present Illness  43 y.o. male with 1 year of enlarging neck mass. Had thyroid surgery as a teenager (believes it was benign). Neck mass becoming uncomfortable, can feel it when swallowing.      PMHx CVA, Asthma, bipolar disorder, depression, DM, glaucoma, hypothyroidism, seizure disorder     Last seen by service in June of 2023, rec RG/TN diet     CMHx Neck mass, acute cough     CXR 8/13/2023  No focal infiltrates or consolidations are identified in the lungs.  No pleural fluid collections are identified.      Pertinent Information  Current Method of Nutrition: Oral diet (SB6/TN0)  Dentition: Fair, Natural dentition  Secretion Management: Adequate  Feeding Tube: None  Tracheostomy: No   Factor(s) Affecting Performance: None      Discussed the risks, benefits, and alternatives of the VFSS procedure. Patient/family acknowledged and agreed to proceed.      Assessment  Videofluoroscopic Swallow Study was conducted in the Lateral projection(s) to evaluate oropharyngeal swallow function. A radiology tech was present to assist with the procedure.      Positioning: Seated upright in fluoroscopy chair  Anatomic View: edema, anterior neck (midline). Edema consistent with neck mass, did impeded swallow or airway protection.  Bolus Administration: SLP, Patient  PO Barium Contrast Trials: Varibar thin, Varibar nectar (mildly thick), Liquidised mixed with Varibar powder, Varibar pudding, Soft & Bite sized coated with Varibar powder, Regular solid coated with Varibar pudding, Mixed consistency with Varibar powder      Consistency PAS Score Timing Residue Comments   Thin Liquid 1 N/A Vallecular Residue: Trace (1%-5%)  Pyriform Sinus Residue: None (0%)    Mildly Thick 1 N/A Vallecular Residue: None (0%)  Pyriform Sinus Residue:  None (0%)    Liquidised 1 N/A Vallecular Residue: Trace (1%-5%)  Pyriform Sinus Residue: None (0%)    Pudding 1 N/A Vallecular Residue: None (0%)  Pyriform Sinus Residue: None (0%)    Soft & Bite Sized 1 N/A Vallecular Residue: None (0%)  Pyriform Sinus Residue: None (0%)    Regular Solid 1 N/A Vallecular Residue: None (0%)  Pyriform Sinus Residue: None (0%)    Mixed 1 N/A Vallecular Residue: None (0%)  Pyriform Sinus Residue: None (0%)      Penetration-Aspiration Scale (PAS)  1     No contrast enters airway  2     Contrast enters the airway, remains above the vocal folds, and is ejected from the airway (not seen in the airway at the end of the swallow).  3     Contrast enters the airway, remains above the vocal folds, and is not ejected from the airway (is seen in the airway after the swallow).  4     Contrast enters the airway, contacts the vocal folds, and is ejected from the airway.  5     Contrast enters the airway, contacts the vocal folds, and is not ejected from the airway  6     Contrast enters the airway, crosses the plane of the vocal folds, and is ejected from the airway.  7     Contrast enters the airway, crosses the plane of the vocal folds, and is not ejected from the airway despite effort.  8     Contrast enters the airway, crosses the plane of the vocal folds, is not ejected from the airway and there is no response to aspiration.       Oral phase: Unremarkable      Pharyngeal phase:  -Reduced base of tongue retraction resulted in trace vallecular residue with thin liquids and liquidized consistencies  -No aspiration or penetration appreciated during study    Compensatory Strategies:  -Spontaneous cleansing swallow - effective to clear residue    Severity Rating:   Severity Rating: DAYDAY     DAYDAY: Normal      Clinical Impressions  Patient presents with a normal swallow. Swallow safety and efficiency are both intact. Diet modification is not indicated. Patient is a good candidate for behavioral swallow  "rehabilitation. Service will monitor patient at this time, pending biopsy results. Service will provide education/ skilled intervention as appropriate. If biopsy results indicate need for chemoradiation or surgical intervention, patient would benefit from speech therapy services in outpatient setting.         Recommendations  Diet Consistency: Regular solids (RG7), thin liquids (TN0)  Medication: As tolerated  Supervision: Independent  Positioning: Fully upright and midline during oral intake  Strategies: Small bites/sips, Slow rate of intake  Oral Care: BID  Additional Instrumentation: None         SLP Treatment Plan  Treatment Plan: Dysphagia Treatment  SLP Frequency: 1x Per Week  Estimated Duration: Until Therapy Goals Met      Anticipated Discharge Needs  Discharge Recommendations: Anticipate that the patient will have no further speech therapy needs after discharge from the hospital   Therapy Recommendations Upon DC: Patient / Family / Caregiver Education        Patient / Family Goals  Patient / Family Goal #1: \"that feels good\" - TN0  Goal #1 Outcome: Progressing as expected  Short Term Goal # 1: Patient will consume a diet of soft/bite sized solids and thin liquids with no overt s/sx of aspiration  Goal Outcome # 1: Goal met, new goal added  Short Term Goal # 1 B : Patient will consume a diet of regular solids and thin liquids with no overt s/sx of aspiration  Short Term Goal # 2: Patient will participate in diagnostic swallow study to objectively assess swallow functiona and further POC  Goal Outcome # 2 : Goal met      Yesenia Fang MS,CCC-SLP    "

## 2023-08-15 NOTE — PROGRESS NOTES
[facility] Medicine Daily Progress Note    Date of Service  8/15/2023    Chief Complaint  Norm Prabhakar is a 43 y.o. male admitted 8/13/2023 with neck mass with pain for over a year.    Hospital Course  Norm Prabhakar is a 43 y.o. male who presented 8/13/2023 with past medical history of asthma, bipolar disorder, depression, diabetes, glaucoma, hypothyroidism, seizure disorders, is coming today complaining of increasing cough, as per patient cough started 4 days ago, cough is dry, patient denies any fever or chills or diaphoresis but complaining of generalized malaise, patient denies any ill contacts although he lives at the shelter, patient denies any chest pain, no wheezing no stridor, patient denies any focal weakness numbness tingling, patient has history of diabetes and he is on long-acting insulin and Premeal insulin, patient stated that he has been taking his medications regularly, also he is on antiseizure medications, patient in the emergency room was found to have normal WBC, hemoglobin 10.9, mild hyponatremia 131, chloride 93, glucose 443, hypomagnesemia with magnesium 1.7 patient has elevated beta hydroxybutyrate acid but normal bicarb at this time patient has received subcu insulin and he is getting IV fluids, will continue monitoring BMP every 4 hours, patient also is getting rapid strep test, checking for procalcitonin, will check for viral infection, patient also has a anterior neck mass that is suspicious for malignancy seen on previous CT, patient denies any difficulty swallowing and no stridor, no other masses or lymphadenopathy in his neck, ER physician discussed case with ENT and also with IR and plan is to hopefully get biopsy tomorrow Monday since patient has not been able to get this biopsy done as outpatient and there is high risk of malignancy and high risk for patient being lost in follow-up, patient is alert and oriented he is able to give good information he is able to speak in full  sentences, patient has been admitted in guarded condition, questions been answered.  He underwent neck mass biopsy on 8/14. Pathology pending     Interval Problem Update    Patient seen and examined, c/o cough, non-productive, some tenderness in neck mass on palpation otherwise stable.   - vitals and labs reviewed, vitals stable, WBC up today, K low, I have ordered replacement   - BG poorly controlled, I have increased lantus from 25 to 30 U. Trend ISS needs and adjust insulin as needed   - neck biopsy results pending, will follow     I have discussed this patient's plan of care and discharge plan at IDT rounds today with Case Management, Nursing, Nursing leadership, and other members of the IDT team.    Consultants/Specialty  ENT    Code Status  Full Code    Disposition  The patient is not medically cleared for discharge to home or a post-acute facility.  Anticipate discharge to: home with close outpatient follow-up    I have placed the appropriate orders for post-discharge needs.    Review of Systems  Review of Systems   Constitutional:  Positive for malaise/fatigue. Negative for chills and fever.   HENT:  Negative for congestion and hearing loss.         Neck pain and swelling   Respiratory:  Positive for cough. Negative for sputum production, shortness of breath, wheezing and stridor.    Cardiovascular:  Negative for chest pain, palpitations and leg swelling.   Gastrointestinal:  Negative for abdominal pain, constipation, diarrhea, heartburn, nausea and vomiting.   Genitourinary:  Negative for dysuria and flank pain.   Musculoskeletal:  Positive for myalgias. Negative for back pain and joint pain.   Neurological:  Positive for weakness. Negative for dizziness, sensory change, speech change, focal weakness and headaches.   Psychiatric/Behavioral:  Negative for depression and memory loss. The patient is not nervous/anxious.         Physical Exam  Temp:  [21.1 °C (69.9 °F)-36.7 °C (98 °F)] 21.1 °C (69.9 °F)  Pulse:   [50-66] 66  Resp:  [17-18] 18  BP: (100-127)/(63-82) 106/66  SpO2:  [96 %-99 %] 96 %    Physical Exam  Vitals and nursing note reviewed.   Constitutional:       General: He is not in acute distress.     Appearance: He is ill-appearing. He is not toxic-appearing or diaphoretic.   HENT:      Head: Normocephalic and atraumatic.      Nose: Nose normal.      Mouth/Throat:      Mouth: Mucous membranes are moist.   Eyes:      General:         Right eye: No discharge.         Left eye: No discharge.      Extraocular Movements: Extraocular movements intact.      Conjunctiva/sclera: Conjunctivae normal.      Comments: Occular asymmetry    Neck:      Thyroid: No thyromegaly.      Vascular: No JVD.      Comments: Firm but mobile Midline neck mass, tenderness to palpation  Cardiovascular:      Rate and Rhythm: Normal rate.      Heart sounds: No murmur heard.  Pulmonary:      Effort: Pulmonary effort is normal. No respiratory distress.      Breath sounds: Normal breath sounds. No stridor. No wheezing or rhonchi.   Abdominal:      General: Bowel sounds are normal. There is no distension.      Palpations: Abdomen is soft. There is no mass.      Tenderness: There is no abdominal tenderness.      Hernia: No hernia is present.   Musculoskeletal:         General: No swelling.      Cervical back: Neck supple. Tenderness present. No rigidity.   Skin:     General: Skin is warm and dry.      Coloration: Skin is not pale.      Findings: No erythema or rash.   Neurological:      Mental Status: He is alert and oriented to person, place, and time.      Cranial Nerves: No cranial nerve deficit.      Sensory: No sensory deficit.      Motor: Weakness present.   Psychiatric:         Behavior: Behavior normal.         Thought Content: Thought content normal.         Fluids    Intake/Output Summary (Last 24 hours) at 8/15/2023 1937  Last data filed at 8/15/2023 0800  Gross per 24 hour   Intake 200 ml   Output 2400 ml   Net -2200 ml          Laboratory  Recent Labs     08/13/23  0653 08/14/23  0021 08/15/23  0311   WBC 10.7 10.8 12.0*   RBC 3.58* 3.50* 3.70*   HEMOGLOBIN 10.9* 10.7* 11.2*   HEMATOCRIT 31.8* 31.6* 34.2*   MCV 88.8 90.3 92.4   MCH 30.4 30.6 30.3   MCHC 34.3 33.9 32.7   RDW 42.1 43.2 45.1   PLATELETCT 228 224 258   MPV 11.0 11.0 10.5     Recent Labs     08/14/23  0021 08/14/23  0609 08/15/23  0311   SODIUM 137 137 139   POTASSIUM 3.6 4.5 3.5*   CHLORIDE 99 100 100   CO2 27 27 28   GLUCOSE 303* 249* 132*   BUN 10 10 10   CREATININE 0.90 0.93 0.96   CALCIUM 9.0 8.5 9.0             Recent Labs     08/14/23  0021   TRIGLYCERIDE 782*   HDL 37*   LDL see below       Imaging  DX-ESOPHAGUS - [id number]   Final Result      IR-FNA BIOPSY W/ FLUORO GUIDANCE   Final Result      Successful ultrasound-guided anterior neck mass fine-needle aspiration.      DX-CHEST-LIMITED (1 VIEW)   Final Result         No acute cardiac or pulmonary abnormality is identified.      DX-NECK FOR SOFT TISSUE   Final Result      Anterior neck mass as described on CT.      The airway appears midline and patent.           Assessment/Plan  * Neck mass- (present on admission)  Assessment & Plan  Seen on previous CT and on x-ray  CT showing large heterogenous mass, concerning for neoplasm, pt has been unable to get follow up care   IR biopsy done 8/14  ENT was consulted by ER physician  f/u path    Acute cough  Assessment & Plan  8/14 chest x-ray neg   rapid strep test neg  procalcitonin negative   Supportive treatment  I have added tessalon     Hyponatremia  Assessment & Plan  Mild likely due to dehydration and hyperglycemia  Resolved   Stop IVF     Primary hypertension- (present on admission)  Assessment & Plan  Not on any blood pressure medication right now  Continue monitoring blood pressure    Mixed hyperlipidemia- (present on admission)  Assessment & Plan    8/14 hypertriglyceridemai  tg >783, LDL noncal  - start fibrate  - would benefit from statin given  underlying DM and cholesterol, start atorvastatin 10mg, will titrate up pending tolerability     Type 2 diabetes mellitus without complication, without long-term current use of insulin (HCC)- (present on admission)  Assessment & Plan  Uncontrolled diabetes  Started on long-acting insulin and sliding scale insulin  Increased Lantus to 30 units   Continue ISS, adjust as needed   May need further adjustments       PTSD (post-traumatic stress disorder)- (present on admission)  Assessment & Plan  Monitoring    History of seizure- (present on admission)  Assessment & Plan  Continue home medication  Seizure precaution    Psychiatric problem- (present on admission)  Assessment & Plan  Stable, Monitoring  Continue SSRI     Anemia- (present on admission)  Assessment & Plan  8/14 f iron levels, ferritin- wnl   vitamin B12 wnl     Anxiety and depression- (present on admission)  Assessment & Plan  Stable Monitoring  Continue home sertraline and trazodone     Postoperative hypothyroidism- (present on admission)  Assessment & Plan  History of lobectomy  Unclear if patient is taking his Synthroid, TSH is elevated at 91.7 and T4 is suppressed.  We will restart his thyroid supplements and make sure patient is taking his medication on an empty stomach    8/14 CT with thyroid mass, s/p biopsy, results pending   Cont synthroid, will need repeat thyroid labs in 4-6 weeks     Glaucoma- (present on admission)  Assessment & Plan  Needs to follow-up with ophthalmology as outpatient    Asthma- (present on admission)  Assessment & Plan  Not in acute exacerbation  Continue RT per protocol  O2 per protocol  cxr neg  Continue albuterol prn          VTE prophylaxis:    Xarelto 10mg daily as prophylaxis      I have performed a physical exam and reviewed and updated ROS and Plan today (8/15/2023). In review of yesterday's note (8/14/2023), there are no changes except as documented above.      Greater than 51 minutes spent prepping to see patient  (e.g. review of tests) obtaining and/or reviewing separately obtained history. Performing a medically appropriate examination and/ evaluation.  Counseling and educating the patient/family/caregiver.  Ordering medications, tests, or procedures.  Referring and communicating with other health care professionals.  Documenting clinical information in EPIC.  Independently interpreting results and communicating results to patient/family/caregiver.  Care coordination.

## 2023-08-16 LAB
ANION GAP SERPL CALC-SCNC: 11 MMOL/L (ref 7–16)
BASOPHILS # BLD AUTO: 0.7 % (ref 0–1.8)
BASOPHILS # BLD: 0.08 K/UL (ref 0–0.12)
BUN SERPL-MCNC: 9 MG/DL (ref 8–22)
CALCIUM SERPL-MCNC: 9 MG/DL (ref 8.5–10.5)
CHLORIDE SERPL-SCNC: 100 MMOL/L (ref 96–112)
CO2 SERPL-SCNC: 29 MMOL/L (ref 20–33)
CREAT SERPL-MCNC: 0.92 MG/DL (ref 0.5–1.4)
EOSINOPHIL # BLD AUTO: 0.15 K/UL (ref 0–0.51)
EOSINOPHIL NFR BLD: 1.4 % (ref 0–6.9)
ERYTHROCYTE [DISTWIDTH] IN BLOOD BY AUTOMATED COUNT: 45.9 FL (ref 35.9–50)
GFR SERPLBLD CREATININE-BSD FMLA CKD-EPI: 107 ML/MIN/1.73 M 2
GLUCOSE BLD STRIP.AUTO-MCNC: 157 MG/DL (ref 65–99)
GLUCOSE BLD STRIP.AUTO-MCNC: 174 MG/DL (ref 65–99)
GLUCOSE BLD STRIP.AUTO-MCNC: 181 MG/DL (ref 65–99)
GLUCOSE BLD STRIP.AUTO-MCNC: 201 MG/DL (ref 65–99)
GLUCOSE SERPL-MCNC: 165 MG/DL (ref 65–99)
HCT VFR BLD AUTO: 37.9 % (ref 42–52)
HGB BLD-MCNC: 12.4 G/DL (ref 14–18)
IMM GRANULOCYTES # BLD AUTO: 0.13 K/UL (ref 0–0.11)
IMM GRANULOCYTES NFR BLD AUTO: 1.2 % (ref 0–0.9)
LYMPHOCYTES # BLD AUTO: 4.96 K/UL (ref 1–4.8)
LYMPHOCYTES NFR BLD: 45.1 % (ref 22–41)
MCH RBC QN AUTO: 30.3 PG (ref 27–33)
MCHC RBC AUTO-ENTMCNC: 32.7 G/DL (ref 32.3–36.5)
MCV RBC AUTO: 92.7 FL (ref 81.4–97.8)
MONOCYTES # BLD AUTO: 0.61 K/UL (ref 0–0.85)
MONOCYTES NFR BLD AUTO: 5.5 % (ref 0–13.4)
NEUTROPHILS # BLD AUTO: 5.07 K/UL (ref 1.82–7.42)
NEUTROPHILS NFR BLD: 46.1 % (ref 44–72)
NRBC # BLD AUTO: 0 K/UL
NRBC BLD-RTO: 0 /100 WBC (ref 0–0.2)
PLATELET # BLD AUTO: 296 K/UL (ref 164–446)
PMV BLD AUTO: 10.4 FL (ref 9–12.9)
POTASSIUM SERPL-SCNC: 4.2 MMOL/L (ref 3.6–5.5)
RBC # BLD AUTO: 4.09 M/UL (ref 4.7–6.1)
SODIUM SERPL-SCNC: 140 MMOL/L (ref 135–145)
WBC # BLD AUTO: 11 K/UL (ref 4.8–10.8)

## 2023-08-16 PROCEDURE — 700102 HCHG RX REV CODE 250 W/ 637 OVERRIDE(OP): Mod: UD | Performed by: INTERNAL MEDICINE

## 2023-08-16 PROCEDURE — A9270 NON-COVERED ITEM OR SERVICE: HCPCS | Mod: UD | Performed by: INTERNAL MEDICINE

## 2023-08-16 PROCEDURE — A9270 NON-COVERED ITEM OR SERVICE: HCPCS | Mod: UD | Performed by: STUDENT IN AN ORGANIZED HEALTH CARE EDUCATION/TRAINING PROGRAM

## 2023-08-16 PROCEDURE — 700102 HCHG RX REV CODE 250 W/ 637 OVERRIDE(OP): Mod: UD | Performed by: HOSPITALIST

## 2023-08-16 PROCEDURE — G0378 HOSPITAL OBSERVATION PER HR: HCPCS

## 2023-08-16 PROCEDURE — 96372 THER/PROPH/DIAG INJ SC/IM: CPT

## 2023-08-16 PROCEDURE — 99232 SBSQ HOSP IP/OBS MODERATE 35: CPT | Performed by: STUDENT IN AN ORGANIZED HEALTH CARE EDUCATION/TRAINING PROGRAM

## 2023-08-16 PROCEDURE — 700102 HCHG RX REV CODE 250 W/ 637 OVERRIDE(OP): Mod: UD | Performed by: STUDENT IN AN ORGANIZED HEALTH CARE EDUCATION/TRAINING PROGRAM

## 2023-08-16 PROCEDURE — 82962 GLUCOSE BLOOD TEST: CPT

## 2023-08-16 PROCEDURE — 80048 BASIC METABOLIC PNL TOTAL CA: CPT

## 2023-08-16 PROCEDURE — A9270 NON-COVERED ITEM OR SERVICE: HCPCS | Mod: UD | Performed by: HOSPITALIST

## 2023-08-16 PROCEDURE — 85025 COMPLETE CBC W/AUTO DIFF WBC: CPT

## 2023-08-16 RX ADMIN — RIVAROXABAN 10 MG: 10 TABLET, FILM COATED ORAL at 18:17

## 2023-08-16 RX ADMIN — SENNOSIDES AND DOCUSATE SODIUM 2 TABLET: 50; 8.6 TABLET ORAL at 06:22

## 2023-08-16 RX ADMIN — GUAIFENESIN SYRUP AND DEXTROMETHORPHAN 5 ML: 100; 10 SYRUP ORAL at 15:38

## 2023-08-16 RX ADMIN — INSULIN HUMAN 2 UNITS: 100 INJECTION, SOLUTION PARENTERAL at 20:38

## 2023-08-16 RX ADMIN — GUAIFENESIN SYRUP AND DEXTROMETHORPHAN 5 ML: 100; 10 SYRUP ORAL at 20:25

## 2023-08-16 RX ADMIN — GUAIFENESIN SYRUP AND DEXTROMETHORPHAN 5 ML: 100; 10 SYRUP ORAL at 09:19

## 2023-08-16 RX ADMIN — LEVOTHYROXINE SODIUM 100 MCG: 0.1 TABLET ORAL at 06:21

## 2023-08-16 RX ADMIN — INSULIN HUMAN 3 UNITS: 100 INJECTION, SOLUTION PARENTERAL at 13:51

## 2023-08-16 RX ADMIN — DIVALPROEX SODIUM 500 MG: 500 TABLET, DELAYED RELEASE ORAL at 06:22

## 2023-08-16 RX ADMIN — INSULIN HUMAN 2 UNITS: 100 INJECTION, SOLUTION PARENTERAL at 18:20

## 2023-08-16 RX ADMIN — INSULIN GLARGINE-YFGN 30 UNITS: 100 INJECTION, SOLUTION SUBCUTANEOUS at 09:26

## 2023-08-16 RX ADMIN — LEVOTHYROXINE SODIUM 125 MCG: 0.12 TABLET ORAL at 06:21

## 2023-08-16 RX ADMIN — MOMETASONE FUROATE AND FORMOTEROL FUMARATE DIHYDRATE 2 PUFF: 200; 5 AEROSOL RESPIRATORY (INHALATION) at 06:22

## 2023-08-16 RX ADMIN — MOMETASONE FUROATE AND FORMOTEROL FUMARATE DIHYDRATE 2 PUFF: 200; 5 AEROSOL RESPIRATORY (INHALATION) at 18:17

## 2023-08-16 RX ADMIN — FENOFIBRATE 67 MG: 67 CAPSULE ORAL at 06:22

## 2023-08-16 RX ADMIN — INSULIN HUMAN 2 UNITS: 100 INJECTION, SOLUTION PARENTERAL at 09:27

## 2023-08-16 RX ADMIN — ATORVASTATIN CALCIUM 10 MG: 10 TABLET, FILM COATED ORAL at 18:17

## 2023-08-16 RX ADMIN — ACETAMINOPHEN 650 MG: 325 TABLET, FILM COATED ORAL at 09:19

## 2023-08-16 RX ADMIN — DIVALPROEX SODIUM 1500 MG: 500 TABLET, DELAYED RELEASE ORAL at 20:25

## 2023-08-16 ASSESSMENT — ENCOUNTER SYMPTOMS
NERVOUS/ANXIOUS: 0
SPEECH CHANGE: 0
COUGH: 1
CHILLS: 0
SPUTUM PRODUCTION: 0
HEARTBURN: 0
MEMORY LOSS: 0
FLANK PAIN: 0
STRIDOR: 0
HEADACHES: 0
WHEEZING: 0
DEPRESSION: 0
WEAKNESS: 1
FEVER: 0
ABDOMINAL PAIN: 0
PALPITATIONS: 0
FOCAL WEAKNESS: 0
SENSORY CHANGE: 0
DIARRHEA: 0
MYALGIAS: 1
SHORTNESS OF BREATH: 0
CONSTIPATION: 0
DIZZINESS: 0
VOMITING: 0
NAUSEA: 0
BACK PAIN: 0

## 2023-08-16 ASSESSMENT — PAIN DESCRIPTION - PAIN TYPE
TYPE: ACUTE PAIN

## 2023-08-16 NOTE — PROGRESS NOTES
[facility] Medicine Daily Progress Note    Date of Service  8/16/2023    Chief Complaint  Norm Prabhakar is a 43 y.o. male admitted 8/13/2023 with neck mass with pain for over a year.    Hospital Course  Norm Prabhakar is a 43 y.o. male who presented 8/13/2023 with past medical history of asthma, bipolar disorder, depression, diabetes, glaucoma, hypothyroidism, seizure disorders, is coming today complaining of increasing cough, as per patient cough started 4 days ago, cough is dry, patient denies any fever or chills or diaphoresis but complaining of generalized malaise, patient denies any ill contacts although he lives at the shelter, patient denies any chest pain, no wheezing no stridor, patient denies any focal weakness numbness tingling, patient has history of diabetes and he is on long-acting insulin and Premeal insulin, patient stated that he has been taking his medications regularly, also he is on antiseizure medications, patient in the emergency room was found to have normal WBC, hemoglobin 10.9, mild hyponatremia 131, chloride 93, glucose 443, hypomagnesemia with magnesium 1.7 patient has elevated beta hydroxybutyrate acid but normal bicarb at this time patient has received subcu insulin and he is getting IV fluids, will continue monitoring BMP every 4 hours, patient also is getting rapid strep test, checking for procalcitonin, will check for viral infection, patient also has a anterior neck mass that is suspicious for malignancy seen on previous CT, patient denies any difficulty swallowing and no stridor, no other masses or lymphadenopathy in his neck, ER physician discussed case with ENT and also with IR and plan is to hopefully get biopsy tomorrow Monday since patient has not been able to get this biopsy done as outpatient and there is high risk of malignancy and high risk for patient being lost in follow-up, patient is alert and oriented he is able to give good information he is able to speak in full  sentences, patient has been admitted in guarded condition, questions been answered.  He underwent neck mass biopsy on 8/14. Pathology pending     Interval Problem Update    Patient seen and examined, no new issues. Still with cough, unchanged. c/o still with  some tenderness in neck mass on palpation otherwise stable.   - vitals and labs reviewed, vitals stable, WBC still up but improving,    - BG improved with increased lantus yesterday, will given additional 24 hours prior to making further changes, goal BG <180     - neck biopsy results still pending, will follow     I have discussed this patient's plan of care and discharge plan at IDT rounds today with Case Management, Nursing, Nursing leadership, and other members of the IDT team.    Consultants/Specialty  ENT    Code Status  Full Code    Disposition  The patient is not medically cleared for discharge to home or a post-acute facility.      I have placed the appropriate orders for post-discharge needs.    Review of Systems  Review of Systems   Constitutional:  Positive for malaise/fatigue. Negative for chills and fever.   HENT:  Negative for congestion and hearing loss.         Neck pain and swelling   Respiratory:  Positive for cough. Negative for sputum production, shortness of breath, wheezing and stridor.    Cardiovascular:  Negative for chest pain, palpitations and leg swelling.   Gastrointestinal:  Negative for abdominal pain, constipation, diarrhea, heartburn, nausea and vomiting.   Genitourinary:  Negative for dysuria and flank pain.   Musculoskeletal:  Positive for myalgias. Negative for back pain and joint pain.   Neurological:  Positive for weakness. Negative for dizziness, sensory change, speech change, focal weakness and headaches.   Psychiatric/Behavioral:  Negative for depression and memory loss. The patient is not nervous/anxious.         Physical Exam  Temp:  [36.3 °C (97.3 °F)-36.7 °C (98 °F)] 36.3 °C (97.4 °F)  Pulse:  [53-77] 63  Resp:   [18-19] 18  BP: (100-124)/(67-80) 112/67  SpO2:  [97 %-99 %] 97 %    Physical Exam  Vitals and nursing note reviewed.   Constitutional:       General: He is not in acute distress.     Appearance: He is ill-appearing. He is not toxic-appearing or diaphoretic.   HENT:      Head: Normocephalic and atraumatic.      Nose: Nose normal.      Mouth/Throat:      Mouth: Mucous membranes are moist.   Eyes:      General:         Right eye: No discharge.         Left eye: No discharge.      Extraocular Movements: Extraocular movements intact.      Conjunctiva/sclera: Conjunctivae normal.      Comments: Occular asymmetry    Neck:      Thyroid: No thyromegaly.      Vascular: No JVD.      Comments: Firm but mobile Midline neck mass, tenderness to palpation  Cardiovascular:      Rate and Rhythm: Normal rate.      Heart sounds: No murmur heard.  Pulmonary:      Effort: Pulmonary effort is normal. No respiratory distress.      Breath sounds: Normal breath sounds. No stridor. No wheezing or rhonchi.   Abdominal:      General: Bowel sounds are normal. There is no distension.      Palpations: Abdomen is soft. There is no mass.      Tenderness: There is no abdominal tenderness.      Hernia: No hernia is present.   Musculoskeletal:         General: No swelling.      Cervical back: Neck supple. Tenderness present. No rigidity.   Skin:     General: Skin is warm and dry.      Coloration: Skin is not pale.      Findings: No erythema or rash.   Neurological:      Mental Status: He is alert and oriented to person, place, and time.      Cranial Nerves: No cranial nerve deficit.      Sensory: No sensory deficit.      Motor: Weakness present.   Psychiatric:         Behavior: Behavior normal.         Thought Content: Thought content normal.         Fluids    Intake/Output Summary (Last 24 hours) at 8/16/2023 1547  Last data filed at 8/16/2023 1100  Gross per 24 hour   Intake 240 ml   Output 1000 ml   Net -760 ml         Laboratory  Recent Labs      08/14/23  0021 08/15/23  0311 08/16/23  0112   WBC 10.8 12.0* 11.0*   RBC 3.50* 3.70* 4.09*   HEMOGLOBIN 10.7* 11.2* 12.4*   HEMATOCRIT 31.6* 34.2* 37.9*   MCV 90.3 92.4 92.7   MCH 30.6 30.3 30.3   MCHC 33.9 32.7 32.7   RDW 43.2 45.1 45.9   PLATELETCT 224 258 296   MPV 11.0 10.5 10.4     Recent Labs     08/14/23  0609 08/15/23  0311 08/16/23  0112   SODIUM 137 139 140   POTASSIUM 4.5 3.5* 4.2   CHLORIDE 100 100 100   CO2 27 28 29   GLUCOSE 249* 132* 165*   BUN 10 10 9   CREATININE 0.93 0.96 0.92   CALCIUM 8.5 9.0 9.0             Recent Labs     08/14/23  0021   TRIGLYCERIDE 782*   HDL 37*   LDL see below       Imaging  DX-ESOPHAGUS - [id number]   Final Result      IR-FNA BIOPSY W/ FLUORO GUIDANCE   Final Result      Successful ultrasound-guided anterior neck mass fine-needle aspiration.      DX-CHEST-LIMITED (1 VIEW)   Final Result         No acute cardiac or pulmonary abnormality is identified.      DX-NECK FOR SOFT TISSUE   Final Result      Anterior neck mass as described on CT.      The airway appears midline and patent.           Assessment/Plan  * Neck mass- (present on admission)  Assessment & Plan  Seen on previous CT and on x-ray  CT showing large heterogenous mass, concerning for neoplasm, pt has been unable to get follow up care   IR biopsy done 8/14, path remains pending   ENT was consulted by ER physician, no formal consultation, may need to reach out pending path results     Acute cough  Assessment & Plan  8/14 chest x-ray neg   rapid strep test neg  procalcitonin negative   Supportive treatment  I have added tessalon     Hyponatremia  Assessment & Plan  Mild likely due to dehydration and hyperglycemia  Resolved   Stop IVF     Primary hypertension- (present on admission)  Assessment & Plan  Not on any blood pressure medication right now  Continue monitoring blood pressure    Mixed hyperlipidemia- (present on admission)  Assessment & Plan    8/14 hypertriglyceridemai  tg >783, LDL noncal  - start  fibrate  - would benefit from statin given underlying DM and cholesterol, start atorvastatin 10mg, will titrate up pending tolerability     Type 2 diabetes mellitus without complication, without long-term current use of insulin (HCC)- (present on admission)  Assessment & Plan  Uncontrolled diabetes  Started on long-acting insulin and sliding scale insulin  Increased Lantus to 30 units 8/15  BG is improving, will monitor for additional 24 hours prior to making more changes today   Continue ISS, adjust as needed   May need further adjustments       PTSD (post-traumatic stress disorder)- (present on admission)  Assessment & Plan  Monitoring    History of seizure- (present on admission)  Assessment & Plan  Continue home medication Depakote   Seizure precaution    Psychiatric problem- (present on admission)  Assessment & Plan  Stable, Monitoring  Continue SSRI     Anemia- (present on admission)  Assessment & Plan  8/14 f iron levels, ferritin- wnl   vitamin B12 wnl     Anxiety and depression- (present on admission)  Assessment & Plan  Stable Monitoring  Continue home sertraline and trazodone     Postoperative hypothyroidism- (present on admission)  Assessment & Plan  History of lobectomy  Unclear if patient is taking his Synthroid, TSH is elevated at 91.7 and T4 is suppressed.  We will restart his thyroid supplements and make sure patient is taking his medication on an empty stomach    8/14 CT with thyroid mass, s/p biopsy, results pending   Cont synthroid, will need repeat thyroid labs in 4-6 weeks     Glaucoma- (present on admission)  Assessment & Plan  Needs to follow-up with ophthalmology as outpatient    Asthma- (present on admission)  Assessment & Plan  Not in acute exacerbation  Continue RT per protocol  O2 per protocol  cxr neg  Continue albuterol prn          VTE prophylaxis:    Xarelto 10mg daily as prophylaxis      I have performed a physical exam and reviewed and updated ROS and Plan today (8/16/2023). In  review of yesterday's note (8/15/2023), there are no changes except as documented above.

## 2023-08-16 NOTE — PROGRESS NOTES
Assumed pt care at 0700 . Pt is A&Ox 4 . Pt rates pain 3/10, medicated per mar  Pt's belongings are nearby, bed is in the lowest position and locked.

## 2023-08-17 ENCOUNTER — PHARMACY VISIT (OUTPATIENT)
Dept: PHARMACY | Facility: MEDICAL CENTER | Age: 43
End: 2023-08-17
Payer: COMMERCIAL

## 2023-08-17 VITALS
HEIGHT: 78 IN | TEMPERATURE: 97.9 F | RESPIRATION RATE: 16 BRPM | SYSTOLIC BLOOD PRESSURE: 110 MMHG | OXYGEN SATURATION: 94 % | DIASTOLIC BLOOD PRESSURE: 54 MMHG | WEIGHT: 260.14 LBS | HEART RATE: 71 BPM | BODY MASS INDEX: 30.1 KG/M2

## 2023-08-17 LAB
GLUCOSE BLD STRIP.AUTO-MCNC: 151 MG/DL (ref 65–99)
GLUCOSE BLD STRIP.AUTO-MCNC: 160 MG/DL (ref 65–99)

## 2023-08-17 PROCEDURE — 700102 HCHG RX REV CODE 250 W/ 637 OVERRIDE(OP): Mod: UD | Performed by: HOSPITALIST

## 2023-08-17 PROCEDURE — 700102 HCHG RX REV CODE 250 W/ 637 OVERRIDE(OP): Mod: UD | Performed by: INTERNAL MEDICINE

## 2023-08-17 PROCEDURE — 82962 GLUCOSE BLOOD TEST: CPT

## 2023-08-17 PROCEDURE — 96372 THER/PROPH/DIAG INJ SC/IM: CPT

## 2023-08-17 PROCEDURE — A9270 NON-COVERED ITEM OR SERVICE: HCPCS | Mod: UD | Performed by: INTERNAL MEDICINE

## 2023-08-17 PROCEDURE — A9270 NON-COVERED ITEM OR SERVICE: HCPCS | Mod: UD | Performed by: HOSPITALIST

## 2023-08-17 PROCEDURE — G0378 HOSPITAL OBSERVATION PER HR: HCPCS

## 2023-08-17 PROCEDURE — RXMED WILLOW AMBULATORY MEDICATION CHARGE: Performed by: STUDENT IN AN ORGANIZED HEALTH CARE EDUCATION/TRAINING PROGRAM

## 2023-08-17 PROCEDURE — 99239 HOSP IP/OBS DSCHRG MGMT >30: CPT | Performed by: STUDENT IN AN ORGANIZED HEALTH CARE EDUCATION/TRAINING PROGRAM

## 2023-08-17 RX ORDER — ACETAMINOPHEN 500 MG
500 TABLET ORAL EVERY 6 HOURS PRN
COMMUNITY
Start: 2023-08-17 | End: 2023-10-27

## 2023-08-17 RX ORDER — BENZONATATE 100 MG/1
100 CAPSULE ORAL 3 TIMES DAILY PRN
Qty: 60 CAPSULE | Refills: 0 | Status: SHIPPED | OUTPATIENT
Start: 2023-08-17 | End: 2023-10-27

## 2023-08-17 RX ORDER — INSULIN LISPRO 100 [IU]/ML
2-12 INJECTION, SOLUTION INTRAVENOUS; SUBCUTANEOUS
Qty: 15 ML | Refills: 0 | Status: ACTIVE | OUTPATIENT
Start: 2023-08-17 | End: 2023-09-17 | Stop reason: SDUPTHER

## 2023-08-17 RX ORDER — FENOFIBRATE 67 MG/1
67 CAPSULE ORAL DAILY
Qty: 30 CAPSULE | Refills: 1 | Status: SHIPPED | OUTPATIENT
Start: 2023-08-18 | End: 2023-10-27

## 2023-08-17 RX ORDER — ATORVASTATIN CALCIUM 10 MG/1
10 TABLET, FILM COATED ORAL EVERY EVENING
Qty: 30 TABLET | Refills: 3 | Status: ON HOLD | OUTPATIENT
Start: 2023-08-17 | End: 2024-01-09

## 2023-08-17 RX ADMIN — ACETAMINOPHEN 650 MG: 325 TABLET, FILM COATED ORAL at 11:23

## 2023-08-17 RX ADMIN — INSULIN HUMAN 2 UNITS: 100 INJECTION, SOLUTION PARENTERAL at 08:45

## 2023-08-17 RX ADMIN — GUAIFENESIN SYRUP AND DEXTROMETHORPHAN 5 ML: 100; 10 SYRUP ORAL at 08:38

## 2023-08-17 RX ADMIN — FENOFIBRATE 67 MG: 67 CAPSULE ORAL at 06:27

## 2023-08-17 RX ADMIN — LEVOTHYROXINE SODIUM 100 MCG: 0.1 TABLET ORAL at 06:31

## 2023-08-17 RX ADMIN — MOMETASONE FUROATE AND FORMOTEROL FUMARATE DIHYDRATE 2 PUFF: 200; 5 AEROSOL RESPIRATORY (INHALATION) at 06:37

## 2023-08-17 RX ADMIN — INSULIN GLARGINE-YFGN 30 UNITS: 100 INJECTION, SOLUTION SUBCUTANEOUS at 06:24

## 2023-08-17 RX ADMIN — DIVALPROEX SODIUM 500 MG: 500 TABLET, DELAYED RELEASE ORAL at 06:00

## 2023-08-17 RX ADMIN — LEVOTHYROXINE SODIUM 125 MCG: 0.12 TABLET ORAL at 06:29

## 2023-08-17 ASSESSMENT — PAIN DESCRIPTION - PAIN TYPE
TYPE: ACUTE PAIN
TYPE: ACUTE PAIN

## 2023-08-17 NOTE — DISCHARGE SUMMARY
Discharge Summary    CHIEF COMPLAINT ON ADMISSION  Chief Complaint   Patient presents with    Cough     Cough and congestion x 3 days, rib pain from coughing, states inhaler he was prescribed he lost this evening       Reason for Admission  flu like symtoms     Admission Date  8/13/2023    CODE STATUS  Full Code    HPI & HOSPITAL COURSE    Norm Prabhakar is a 43 y.o. male with a past medical history of asthma, bipolar disorder, depression, diabetes, glaucoma, hypothyroidism, seizure disorders who presented to the ER on 8/13/2023 complaining of increasing cough, as per patient cough started 4 days ago, cough is dry, patient denies any fever or chills or diaphoresis but complaining of generalized malaise, patient denies any ill contacts although he lives at the shelter, patient denies any chest pain, no wheezing no stridor, patient denies any focal weakness numbness tingling.  In the emergency room he was found to have normal WBC, hemoglobin 10.9, mild hyponatremia 131, chloride 93, glucose 443, hypomagnesemia with magnesium 1.7, patient has elevated beta hydroxybutyrate acid but normal bicarb at this time patient has received subcu insulin and was treated with IV fluids.   Imaging and viral panel and procalcitonin were negative so no antibiotics were started.   patient also has a anterior neck mass that is suspicious for malignancy seen on previous CT. ER physician discussed case with ENT and also with IR and biopsy was done which showed follicular thyroid tissues, no malignant features were noted. Patient does have history of partial thyroidectomy for large goiter ~ 20 years ago, biopsy in 2012 does not suggest malignancy however given his history he will be scheduled follow up with thyroid surgery, I have made this referral and provided the patient with this information.   Patient is medically stable for discharge.       Therefore, he is discharged in fair and stable condition to home with close outpatient  follow-up.    The patient met criteria for an observation stay at the time of discharge.    Discharge Date  8/17/2023    FOLLOW UP ITEMS POST DISCHARGE  Follow with thyroid surgery, may need surgical resection of anterior neck mass/goiter   Diabetes management   Chronic medical condition     DISCHARGE DIAGNOSES  Principal Problem:    Neck mass (POA: Yes)  Active Problems:    Asthma (POA: Yes)    Glaucoma (POA: Yes)    Postoperative hypothyroidism (POA: Yes)    Anxiety and depression (POA: Yes)    Anemia (POA: Yes)    Psychiatric problem (POA: Yes)      Overview: PTSD    History of seizure (POA: Yes)    PTSD (post-traumatic stress disorder) (POA: Yes)      Overview: IMO load March 2020    Type 2 diabetes mellitus without complication, without long-term current use of insulin (HCC) (POA: Yes)    Mixed hyperlipidemia (POA: Yes)    Primary hypertension (POA: Yes)    Hyponatremia (POA: Unknown)    Acute cough (POA: Unknown)  Resolved Problems:    * No resolved hospital problems. *      FOLLOW UP  No future appointments.  Lexie Arellano M.D.  [address] [place] [id number]  [phone]    Schedule an appointment as soon as possible for a visit in 4 week(s)  call to schedule appointment [facility] Dr. Arellano for further planning/evaluation of your thyroid mass      MEDICATIONS ON DISCHARGE     Medication List        START taking these medications        Instructions   acetaminophen 500 MG Tabs  Commonly known as: Tylenol   Take 1 Tablet by mouth every 6 hours as needed for Mild Pain or Moderate Pain.  Dose: 500 mg     atorvastatin 10 MG Tabs  Commonly known as: Lipitor   Take 1 Tablet by mouth every evening.  Dose: 10 mg     benzonatate 100 MG Caps  Commonly known as: Tessalon   Take 1 Capsule by mouth 3 times a day as needed for Cough.  Dose: 100 mg     fenofibrate micronized 67 MG capsule  Start taking on: August 18, 2023  Commonly known as: Lofibra   Take 1 Capsule by mouth every day.  Dose: 67 mg    "  mometasone-formoterol 200-5 MCG/ACT Aero  Commonly known as: Dulera   Inhale 2 Puffs 2 times a day.  Dose: 2 Puff            CONTINUE taking these medications        Instructions   albuterol 108 (90 Base) MCG/ACT Aers inhalation aerosol   Inhale 2 Puffs every 6 hours as needed (cough).  Dose: 2 Puff     divalproex 500 MG Tbec  Commonly known as: Depakote   Take 500-1,500 mg by mouth 2 times a day. 500 mg in AM and 1500 mg at night  Dose: 500-1,500 mg     insulin lispro 100 UNIT/ML Sopn injection PEN  Commonly known as: HumaLOG/AdmeLOG   Inject 2-12 Units under the skin 4 Times a Day,Before Meals and at Bedtime for 30 days.  Dose: 2-12 Units     Lantus SoloStar 100 UNIT/ML Sopn injection  Generic drug: insulin glargine   Inject 34 Units under the skin every morning.  Dose: 34 Units     Latuda 80 MG Tabs  Generic drug: lurasidone   Take 80 mg by mouth with dinner.  Dose: 80 mg     * levothyroxine 100 MCG Tabs  Commonly known as: Synthroid   Take 100 mcg by mouth every morning on an empty stomach. Total of 225 mcg once a day  Dose: 100 mcg     * levothyroxine 125 MCG Tabs  Commonly known as: Synthroid   Take 125 mcg by mouth every morning on an empty stomach. Total of 225 mcg once a day  Dose: 125 mcg     sertraline 100 MG Tabs  Commonly known as: Zoloft   Take 100 mg by mouth every day.  Dose: 100 mg     traZODone 50 MG Tabs  Commonly known as: Desyrel   Take 50 mg by mouth every evening.  Dose: 50 mg           * This list has 2 medication(s) that are the same as other medications prescribed for you. Read the directions carefully, and ask your doctor or other care provider to review them with you.                  Allergies  Allergies   Allergen Reactions    Abilify      \"Feeling tired, like I don't even know whats going on around me\"    Fish      Pt reports salmon causes him to be sick to his stomach  Not listed on MAR noted 2/3/2021      Geodon [Ziprasidone Hcl] Anaphylaxis     Anaphylaxis per patient    " "Aripiprazole      \"I became lethargic.\"    Hydroxyzine Itching     Pt will only state \"I feel itchy when I take it    Ziprasidone        DIET  Orders Placed This Encounter   Procedures    Diet Order Diet: Consistent CHO (Diabetic)     Standing Status:   Standing     Number of Occurrences:   1     Order Specific Question:   Diet:     Answer:   Consistent CHO (Diabetic) [4]       ACTIVITY  As tolerated.      CONSULTATIONS  NA    PROCEDURES  Anterior neck mass biopsy 8/14/2023    LABORATORY  Lab Results   Component Value Date    SODIUM 140 08/16/2023    POTASSIUM 4.2 08/16/2023    CHLORIDE 100 08/16/2023    CO2 29 08/16/2023    GLUCOSE 165 (H) 08/16/2023    BUN 9 08/16/2023    CREATININE 0.92 08/16/2023    GLOMRATE 89 05/18/2023        Lab Results   Component Value Date    WBC 11.0 (H) 08/16/2023    HEMOGLOBIN 12.4 (L) 08/16/2023    HEMATOCRIT 37.9 (L) 08/16/2023    PLATELETCT 296 08/16/2023        Total time of the discharge process exceeds 40 minutes.  "

## 2023-08-17 NOTE — PROGRESS NOTES
Assumed care of pt at 1900. Report received from Day RN. Pt is A&O x 4 . Patient stated that their pain is 0/10 currently. Pt's pain comfort goal is 0/10.     Bed in lowest locked position, call light within reach, hourly rounding in place.      Notes reviewed, Labs reviewed, Orders reviewed, communication board updated. Pt declines any further needs at this time.      4Ps: Pain, Potty, Positioning, and Possessions discussed with patient.  Patient has no pain, urinal nearby and toilet ing independently. Pt position comfortable and call-light in reach     Questions concerning hospital course of treatment from current providers answered.

## 2023-08-17 NOTE — PROGRESS NOTES
Discharge paperwork discussed with the patient, signed copy in the chart. Pt currently waiting for Meds to beds.

## 2023-08-17 NOTE — PROGRESS NOTES
Pt picked up by transport to go to discharge [facility]. Pt provided a cab voucher. PIV removed. All belongings with the patient.

## 2023-08-17 NOTE — CARE PLAN
The patient is Stable - Low risk of patient condition declining or worsening    Shift Goals  Clinical Goals: pain control, swallow eval  Patient Goals: pain control  Family Goals: n/a    Progress made toward(s) clinical / shift goals:  Patient headache controlled with tylenol. Per speech therapy, swallow not effected by mass. Patient able to start regular diabetic diet.       Problem: Knowledge Deficit - Standard  Goal: Patient and family/care givers will demonstrate understanding of plan of care, disease process/condition, diagnostic tests and medications  Outcome: Progressing     Problem: Pain - Standard  Goal: Alleviation of pain or a reduction in pain to the patient’s comfort goal  Outcome: Progressing     Problem: Fall Risk  Goal: Patient will remain free from falls  Outcome: Progressing       Patient is not progressing towards the following goals:      
The patient is Stable - Low risk of patient condition declining or worsening    Shift Goals  Clinical Goals: control blood sugar  Patient Goals: pain control    Progress made toward(s) clinical / shift goals:  blood sugar below 500 overnight, pt did not complain of pain overnight 0/10 and had adequate time of rest    Patient is not progressing towards the following goals:      
The patient is Stable - Low risk of patient condition declining or worsening    Shift Goals  Clinical Goals: control blood sugar, assess neck mass  Patient Goals: pain control    Progress made toward(s) clinical / shift goals:        Problem: Knowledge Deficit - Standard  Goal: Patient and family/care givers will demonstrate understanding of plan of care, disease process/condition, diagnostic tests and medications  Outcome: Progressing  Note: Pt expresses understanding of risk that the neck mass poses on airway, pt expresses understanding of the medications being administered to help increase airway patency, pt expresses understanding of necessity for FSBG.     Problem: Pain - Standard  Goal: Alleviation of pain or a reduction in pain to the patient’s comfort goal  Outcome: Progressing  Flowsheets (Taken 8/14/2023 1611)  Pain Rating Scale (NPRS): 2  Note: Pt reports a pain of 2/10 which is within desired goal of <5/10. Pt's pain is managed with medications.       Patient is not progressing towards the following goals:      
The patient is Stable - Low risk of patient condition declining or worsening    Shift Goals  Clinical Goals: pain management  Patient Goals: comfrot  Family Goals: n/a    Progress made toward(s) clinical / shift goals:    Problem: Pain - Standard  Goal: Alleviation of pain or a reduction in pain to the patient’s comfort goal  Outcome: Progressing     Problem: Fall Risk  Goal: Patient will remain free from falls  Outcome: Progressing       Patient is not progressing towards the following goals:      
The patient is Stable - Low risk of patient condition declining or worsening    Shift Goals  Clinical Goals: pain management, neck mass, rest  Patient Goals: pain management, rest  Family Goals: n/a    Progress made toward(s) clinical / shift goals:    Problem: Pain - Standard  Goal: Alleviation of pain or a reduction in pain to the patient’s comfort goal of 3/10  Outcome: Progressing     Problem: Fall Risk  Goal: Patient will remain free from falls  Outcome: Progressing       Patient is not progressing towards the following goals:      
The patient is Stable - Low risk of patient condition declining or worsening    Shift Goals  Clinical Goals: pain management, neck mass, rest  Patient Goals: pain management, rest  Family Goals: n/a    Progress made toward(s) clinical / shift goals: Pt complaining of HA pain and throat discomfort with non-productive cough. Prn tylenol given, see MAR with relief. Scheduled guaifenesin given, per pt effective. Pt tolerating diet and able to take medications with thin liquids well. Scheduled medications administered per MAR. Plan of care reviewed with the pt, pt verbally understands. Fall and safety education provided to the pt and precautions maintained. Bed kept in lowest position and call light within reach. Hourly rounding done. Pt calls appropriately. Pt remains free from falls. All needs met. Pt meets pain goal of resting/sleeping comfortably.        Problem: Knowledge Deficit - Standard  Goal: Patient and family/care givers will demonstrate understanding of plan of care, disease process/condition, diagnostic tests and medications  Outcome: Progressing     Problem: Pain - Standard  Goal: Alleviation of pain or a reduction in pain to the patient’s comfort goal  Outcome: Progressing     Problem: Fall Risk  Goal: Patient will remain free from falls  Outcome: Progressing       Patient is not progressing towards the following goals:      
The patient is Stable - Low risk of patient condition declining or worsening    Shift Goals  Clinical Goals: pain management, rest  Patient Goals: pain management, rest  Family Goals: n/a    Progress made toward(s) clinical / shift goals:  Pt complaining of throat discomfort with non-productive cough, refusing pain medication. No pain identifiers noted. Scheduled guaifenesin given, per pt effective. Pt tolerating diet and able to take medications with thin liquids well. Scheduled medications administered per MAR. Plan of care reviewed with the pt, pt verbally understands. Fall and safety education provided to the pt and precautions maintained. Bed kept in lowest position and call light within reach. Hourly rounding done. Pt calls appropriately. Pt remains free from falls. All needs met. Pt meets goal, resting/sleeping comfortably.       Problem: Knowledge Deficit - Standard  Goal: Patient and family/care givers will demonstrate understanding of plan of care, disease process/condition, diagnostic tests and medications  Outcome: Progressing     Problem: Pain - Standard  Goal: Alleviation of pain or a reduction in pain to the patient’s comfort goal  Outcome: Progressing     Problem: Fall Risk  Goal: Patient will remain free from falls  Outcome: Progressing       Patient is not progressing towards the following goals:      
The patient is Watcher - Medium risk of patient condition declining or worsening    Shift Goals  Clinical Goals: IVF, pain control, safety  Patient Goals: rest    Progress made toward(s) clinical / shift goals:  Pt remained safe and free of falls. Updated on plan of care, agreeable and states understanding. Pt able to rest comfortably after medicated for headache.    Patient is not progressing towards the following goals:      
immune

## 2023-08-17 NOTE — DISCHARGE INSTRUCTIONS
Take medications as directed, you have been prescribed cough medicine and inhaler to help with your symptoms   Referral to thyroid surgeon has been made, they should call to schedule however if you do not hear from them, I have provider their information for you to call and scheduled   Follow up with your primary care doctor and [facility] in the next 1-2 weeks       Discharge Instructions    Discharged to home by Taxi with self. Discharged via wheelchair, hospital escort: Yes.  Special equipment needed: Not Applicable    Be sure to schedule a follow-up appointment with your primary care doctor or any specialists as instructed.     Discharge Plan:   Diet Plan: Discussed  Activity Level: Discussed  Confirmed Follow up Appointment: Patient to Call and Schedule Appointment  Confirmed Symptoms Management: Discussed  Medication Reconciliation Updated: Yes    I understand that a diet low in cholesterol, fat, and sodium is recommended for good health. Unless I have been given specific instructions below for another diet, I accept this instruction as my diet prescription.   Other diet: soft diet     Special Instructions: None    -Is this patient being discharged with medication to prevent blood clots?  No    Is patient discharged on Warfarin / Coumadin?   No

## 2023-08-17 NOTE — DISCHARGE PLANNING
's Note    Pt to discharge home today, back in [facility]. RN BOUCHRA provided Pt with cab voucher #[id number] as requested.

## 2023-08-22 ENCOUNTER — PATIENT OUTREACH (OUTPATIENT)
Dept: HEALTH INFORMATION MANAGEMENT | Facility: OTHER | Age: 43
End: 2023-08-22
Payer: MEDICAID

## 2023-08-22 NOTE — PROGRESS NOTES
CHW Aida attempted to contact pt post discharge to offer Community Care Management services. CHW was unable to reach pt. CCM contact information was provided. CHW will try again at a later date.

## 2023-09-13 NOTE — PROGRESS NOTES
CHW Aida was unable to reach pt. Community Care Management contact information was provided via . CHW encouraged pt to call if anything was needed. CHW will not continue to follow at this time.

## 2023-09-15 ENCOUNTER — HOSPITAL ENCOUNTER (EMERGENCY)
Facility: MEDICAL CENTER | Age: 43
End: 2023-09-15
Attending: EMERGENCY MEDICINE
Payer: MEDICAID

## 2023-09-15 ENCOUNTER — APPOINTMENT (OUTPATIENT)
Dept: RADIOLOGY | Facility: MEDICAL CENTER | Age: 43
End: 2023-09-15
Attending: EMERGENCY MEDICINE
Payer: MEDICAID

## 2023-09-15 VITALS
WEIGHT: 259.48 LBS | TEMPERATURE: 98.6 F | HEART RATE: 92 BPM | BODY MASS INDEX: 30.02 KG/M2 | HEIGHT: 78 IN | DIASTOLIC BLOOD PRESSURE: 82 MMHG | RESPIRATION RATE: 16 BRPM | SYSTOLIC BLOOD PRESSURE: 126 MMHG | OXYGEN SATURATION: 98 %

## 2023-09-15 DIAGNOSIS — K59.00 CONSTIPATION, UNSPECIFIED CONSTIPATION TYPE: ICD-10-CM

## 2023-09-15 DIAGNOSIS — R11.2 NAUSEA AND VOMITING, UNSPECIFIED VOMITING TYPE: ICD-10-CM

## 2023-09-15 LAB
ALBUMIN SERPL BCP-MCNC: 4.4 G/DL (ref 3.2–4.9)
ALBUMIN/GLOB SERPL: 1.9 G/DL
ALP SERPL-CCNC: 83 U/L (ref 30–99)
ALT SERPL-CCNC: 12 U/L (ref 2–50)
ANION GAP SERPL CALC-SCNC: 11 MMOL/L (ref 7–16)
AST SERPL-CCNC: 24 U/L (ref 12–45)
BASOPHILS # BLD AUTO: 1.4 % (ref 0–1.8)
BASOPHILS # BLD: 0.11 K/UL (ref 0–0.12)
BILIRUB SERPL-MCNC: 0.4 MG/DL (ref 0.1–1.5)
BUN SERPL-MCNC: 15 MG/DL (ref 8–22)
CALCIUM ALBUM COR SERPL-MCNC: 8.9 MG/DL (ref 8.5–10.5)
CALCIUM SERPL-MCNC: 9.2 MG/DL (ref 8.5–10.5)
CHLORIDE SERPL-SCNC: 100 MMOL/L (ref 96–112)
CO2 SERPL-SCNC: 24 MMOL/L (ref 20–33)
CREAT SERPL-MCNC: 0.85 MG/DL (ref 0.5–1.4)
EOSINOPHIL # BLD AUTO: 0.12 K/UL (ref 0–0.51)
EOSINOPHIL NFR BLD: 1.5 % (ref 0–6.9)
ERYTHROCYTE [DISTWIDTH] IN BLOOD BY AUTOMATED COUNT: 45.1 FL (ref 35.9–50)
GFR SERPLBLD CREATININE-BSD FMLA CKD-EPI: 112 ML/MIN/1.73 M 2
GLOBULIN SER CALC-MCNC: 2.3 G/DL (ref 1.9–3.5)
GLUCOSE SERPL-MCNC: 369 MG/DL (ref 65–99)
HCT VFR BLD AUTO: 34.3 % (ref 42–52)
HGB BLD-MCNC: 11.8 G/DL (ref 14–18)
IMM GRANULOCYTES # BLD AUTO: 0.06 K/UL (ref 0–0.11)
IMM GRANULOCYTES NFR BLD AUTO: 0.7 % (ref 0–0.9)
LIPASE SERPL-CCNC: 16 U/L (ref 11–82)
LYMPHOCYTES # BLD AUTO: 3.03 K/UL (ref 1–4.8)
LYMPHOCYTES NFR BLD: 37.5 % (ref 22–41)
MCH RBC QN AUTO: 31.1 PG (ref 27–33)
MCHC RBC AUTO-ENTMCNC: 34.4 G/DL (ref 32.3–36.5)
MCV RBC AUTO: 90.5 FL (ref 81.4–97.8)
MONOCYTES # BLD AUTO: 0.52 K/UL (ref 0–0.85)
MONOCYTES NFR BLD AUTO: 6.4 % (ref 0–13.4)
NEUTROPHILS # BLD AUTO: 4.24 K/UL (ref 1.82–7.42)
NEUTROPHILS NFR BLD: 52.5 % (ref 44–72)
NRBC # BLD AUTO: 0 K/UL
NRBC BLD-RTO: 0 /100 WBC (ref 0–0.2)
PLATELET # BLD AUTO: 269 K/UL (ref 164–446)
PMV BLD AUTO: 10.8 FL (ref 9–12.9)
POTASSIUM SERPL-SCNC: 4.2 MMOL/L (ref 3.6–5.5)
PROT SERPL-MCNC: 6.7 G/DL (ref 6–8.2)
RBC # BLD AUTO: 3.79 M/UL (ref 4.7–6.1)
SODIUM SERPL-SCNC: 135 MMOL/L (ref 135–145)
WBC # BLD AUTO: 8.1 K/UL (ref 4.8–10.8)

## 2023-09-15 PROCEDURE — 96372 THER/PROPH/DIAG INJ SC/IM: CPT

## 2023-09-15 PROCEDURE — 700105 HCHG RX REV CODE 258: Mod: UD | Performed by: EMERGENCY MEDICINE

## 2023-09-15 PROCEDURE — 85025 COMPLETE CBC W/AUTO DIFF WBC: CPT

## 2023-09-15 PROCEDURE — 99284 EMERGENCY DEPT VISIT MOD MDM: CPT

## 2023-09-15 PROCEDURE — 83690 ASSAY OF LIPASE: CPT

## 2023-09-15 PROCEDURE — 36415 COLL VENOUS BLD VENIPUNCTURE: CPT

## 2023-09-15 PROCEDURE — 74018 RADEX ABDOMEN 1 VIEW: CPT

## 2023-09-15 PROCEDURE — 700111 HCHG RX REV CODE 636 W/ 250 OVERRIDE (IP): Mod: UD | Performed by: EMERGENCY MEDICINE

## 2023-09-15 PROCEDURE — 80053 COMPREHEN METABOLIC PANEL: CPT

## 2023-09-15 RX ORDER — SODIUM CHLORIDE 9 MG/ML
1000 INJECTION, SOLUTION INTRAVENOUS ONCE
Status: COMPLETED | OUTPATIENT
Start: 2023-09-15 | End: 2023-09-15

## 2023-09-15 RX ORDER — DICYCLOMINE HCL 20 MG
20 TABLET ORAL EVERY 6 HOURS
Qty: 20 TABLET | Refills: 0 | Status: SHIPPED | OUTPATIENT
Start: 2023-09-15 | End: 2023-10-27

## 2023-09-15 RX ORDER — POLYETHYLENE GLYCOL 3350 17 G/17G
17 POWDER, FOR SOLUTION ORAL DAILY
Qty: 3 EACH | Refills: 0 | Status: SHIPPED | OUTPATIENT
Start: 2023-09-15 | End: 2023-09-18

## 2023-09-15 RX ORDER — ONDANSETRON 4 MG/1
4 TABLET, ORALLY DISINTEGRATING ORAL ONCE
Status: COMPLETED | OUTPATIENT
Start: 2023-09-15 | End: 2023-09-15

## 2023-09-15 RX ORDER — DOCUSATE SODIUM 100 MG/1
100 CAPSULE, LIQUID FILLED ORAL 2 TIMES DAILY
Qty: 60 CAPSULE | Refills: 0 | Status: SHIPPED | OUTPATIENT
Start: 2023-09-15 | End: 2023-10-27

## 2023-09-15 RX ORDER — DICYCLOMINE HYDROCHLORIDE 10 MG/ML
20 INJECTION INTRAMUSCULAR ONCE
Status: COMPLETED | OUTPATIENT
Start: 2023-09-15 | End: 2023-09-15

## 2023-09-15 RX ORDER — ONDANSETRON 4 MG/1
4 TABLET, ORALLY DISINTEGRATING ORAL EVERY 6 HOURS PRN
Qty: 10 TABLET | Refills: 0 | Status: SHIPPED | OUTPATIENT
Start: 2023-09-15 | End: 2023-10-27

## 2023-09-15 RX ADMIN — ONDANSETRON 4 MG: 4 TABLET, ORALLY DISINTEGRATING ORAL at 11:31

## 2023-09-15 RX ADMIN — SODIUM CHLORIDE 1000 ML: 9 INJECTION, SOLUTION INTRAVENOUS at 11:12

## 2023-09-15 RX ADMIN — DICYCLOMINE HYDROCHLORIDE 20 MG: 20 INJECTION INTRAMUSCULAR at 11:30

## 2023-09-15 ASSESSMENT — FIBROSIS 4 INDEX: FIB4 SCORE: 0.87

## 2023-09-15 NOTE — ED NOTES
Received orders for DC. Educated regarding f/u care, how to manage constipation, management of blood glucose, and when to return to ED. PIV removed. Ambulatory and able to dress self. All questions addressed.

## 2023-09-15 NOTE — ED TRIAGE NOTES
"Chief Complaint   Patient presents with    Abdominal Pain     L sided \"squeezing\" 7/10 abdominal pain X 3 days. +nausea and vomiting. Reports he can't keep anything down. +frequent, loose stools     Pt ambulatory to triage for above complaint.   VSS, in NAD.   Protocol ordered.  Pt educated on triage process and to alert staff of any changes. Placed back in the lobby.      "

## 2023-09-15 NOTE — ED PROVIDER NOTES
"ER Provider Note    Scribed for Norm Fabian M.d. by Leonor Jasso. 9/15/2023  10:21 AM    Primary Care Provider: Pcp Pt States None    CHIEF COMPLAINT  Chief Complaint   Patient presents with    Abdominal Pain     L sided \"squeezing\" 7/10 abdominal pain X 3 days. +nausea and vomiting. Reports he can't keep anything down. +frequent, loose stools     EXTERNAL RECORDS REVIEWED  Inpatient Notes The patient was seen on 8/9/23 for flu-like symptoms and discharged home after being diagnosed with a viral illness.     HPI/ROS  LIMITATION TO HISTORY   Select: : None    OUTSIDE HISTORIAN(S):  None noted    Norm Prabhakar is a 43 y.o. male who presents to the ED complaining of right to mid upper abdominal pain onset 3 days ago. He rates his pain a 7/10 in severity. He reports nausea and vomiting with any PO intake. He has not taking any anti-nausea medications at home. He describes his abdominal pain as a squeezing pain. He reports associated frequent bowel movements, but denies any diarrhea.     PAST MEDICAL HISTORY  Past Medical History:   Diagnosis Date    Anxiety     BIPOLAR    ASTHMA     Bipolar 1 disorder ([facility])     Depression     Diabetes ([facility])     Type II Diabetes    Fall     passed out 2 wks ago    Glaucoma     Glaucoma 1982    both eyes/ blind on left eye    Hypothyroidism     Indigestion     once in a while    Mental disorder     learning disabilities; speech impairment; developmental delays    Murmur     since birth    Pneumonia     remote    Psychiatric problem 2002    PTSD    S/P thyroidectomy     Seizure ([facility]) 2010    Seizure disorder ([facility])     Unspecified disorder of thyroid        SURGICAL HISTORY  Past Surgical History:   Procedure Laterality Date    EYE SURGERY      OTHER      Hernia Repair when he was 10 yrs old    THYROID LOBECTOMY         FAMILY HISTORY  Family History   Problem Relation Age of Onset    Hypertension Mother     Heart Disease Mother     Lung Disease Mother     Stroke Maternal Grandmother  "       SOCIAL HISTORY   reports that he quit smoking about 3 years ago. His smoking use included cigarettes and cigars. He has never used smokeless tobacco. He reports that he does not currently use alcohol. He reports current drug use. Drug: Inhaled.    CURRENT MEDICATIONS  Previous Medications    ACETAMINOPHEN (TYLENOL) 500 MG TAB    Take 1 Tablet by mouth every 6 hours as needed for Mild Pain or Moderate Pain.    ALBUTEROL 108 (90 BASE) MCG/ACT AERO SOLN INHALATION AEROSOL    Inhale 2 Puffs every 6 hours as needed (cough).    ATORVASTATIN (LIPITOR) 10 MG TAB    Take 1 Tablet by mouth every evening.    BENZONATATE (TESSALON) 100 MG CAP    Take 1 Capsule by mouth 3 times a day as needed for Cough.    DIVALPROEX (DEPAKOTE) 500 MG TABLET DELAYED RESPONSE    Take 500-1,500 mg by mouth 2 times a day. 500 mg in AM and 1500 mg at night    FENOFIBRATE MICRONIZED (LOFIBRA) 67 MG CAPSULE    Take 1 Capsule by mouth every day.    INSULIN GLARGINE (LANTUS SOLOSTAR) 100 UNIT/ML SOLUTION PEN-INJECTOR INJECTION    Inject 34 Units under the skin every morning.    INSULIN LISPRO 100 UNIT/ML SC SOPN INJECTION PEN    Inject 2-12 Units under the skin 4 Times a Day,Before Meals and at Bedtime for 30 days.    INSULIN PEN NEEDLE 32 G X 4 MM    Use one pen needle in pen device to inject insulin five times daily.    LEVOTHYROXINE (SYNTHROID) 100 MCG TAB    Take 100 mcg by mouth every morning on an empty stomach. Total of 225 mcg once a day    LEVOTHYROXINE (SYNTHROID) 125 MCG TAB    Take 125 mcg by mouth every morning on an empty stomach. Total of 225 mcg once a day    LURASIDONE (LATUDA) 80 MG TAB    Take 80 mg by mouth with dinner.    MOMETASONE-FORMOTEROL (DULERA) 200-5 MCG/ACT AEROSOL    Inhale 2 Puffs 2 times a day.    SERTRALINE (ZOLOFT) 100 MG TAB    Take 100 mg by mouth every day.    TRAZODONE (DESYREL) 50 MG TAB    Take 50 mg by mouth every evening.       ALLERGIES  Abilify, Fish, Geodon [ziprasidone hcl], Aripiprazole,  "Hydroxyzine, and Ziprasidone    PHYSICAL EXAM  VITAL SIGNS: /82   Pulse 77   Temp 35.8 °C (96.5 °F) (Temporal)   Resp 17   Ht 1.981 m (6' 6\")   Wt 118 kg (259 lb 7.7 oz)   SpO2 97%   BMI 29.99 kg/m²   Pulse ox interpretation: I interpret this pulse ox as normal.  Constitutional: Alert in no apparent distress.  HENT: No signs of trauma, Bilateral external ears normal, Nose normal.   Eyes: Conjunctiva normal, Non-icteric.   Neck: Normal range of motion, Supple, No stridor.   Lymphatic: No lymphadenopathy noted.   Cardiovascular: Regular rate and rhythm, no murmurs.   Thorax & Lungs: Normal breath sounds, No respiratory distress, No wheezing  Abdomen: Bowel sounds normal, Soft, Elevated BMI vs slight distension, Non localizing tenderness across the upper abdomen, No masses, No pulsatile masses. No peritoneal signs.  Skin: Warm, Dry, No erythema, No rash.   Back: No midline bony tenderness.   Extremities: Intact distal pulses, No edema, No cyanosis.  Musculoskeletal: Good range of motion in all major joints. No or major deformities noted.   Neurologic: Alert , Normal motor function, Normal sensory function, No focal deficits noted.   Psychiatric: Affect normal, Judgment normal, Mood normal.     DIAGNOSTIC STUDIES    Labs:   Labs Reviewed   CBC WITH DIFFERENTIAL - Abnormal; Notable for the following components:       Result Value    RBC 3.79 (*)     Hemoglobin 11.8 (*)     Hematocrit 34.3 (*)     All other components within normal limits   COMP METABOLIC PANEL - Abnormal; Notable for the following components:    Glucose 369 (*)     All other components within normal limits   LIPASE   ESTIMATED GFR     Radiology:   The attending emergency physician has independently interpreted the diagnostic imaging associated with this visit and am waiting the final reading from the radiologist.   Preliminary interpretation is a follows: No sign of obstruction.    Radiologist interpretation:   [id number] VIEW   Final " Result      1.  Mild constipation.          COURSE & MEDICAL DECISION MAKING     ED Observation Status? Yes; I am placing the patient in to an observation status due to a diagnostic uncertainty as well as therapeutic intensity. Patient placed in observation status at 10:47 AM, 9/15/2023.     Observation plan is as follows: The patient will be observed pending labs and imaging and treatment of his symptoms with Zofran and Bentyl.    Upon Reevaluation, the patient's condition has: Improved; and will be discharged.    Patient discharged from ED Observation status at 11:13 AM (Time) 9/15/2023 (Date).     INITIAL ASSESSMENT, COURSE AND PLAN  Care Narrative:   HYDRATION: Based on the patient's presentation of Acute Vomiting the patient was given IV fluids. IV Hydration was used because oral hydration was not adequate alone. Upon recheck following hydration, the patient was improved.    10:21 AM Patient presents to the ED with abdominal pain and vomiting. Per triage protocol, GFR was ordered. Patient evaluated at bedside and discussed plan of care, including labs, imaging, and treatment of his nausea. Patient will be treated with Zofran 4 mg and Bentyl 20 mg. Ordered for Dx-abdomen, CBC with differential, CMP, Lipase and UA to evaluate his symptoms. Differential diagnoses include but not limited to: favors viral gastroenteritis, also include, dehydration, DKA, bacterial abdominal infection such as gallbladder disease. Pancreatitis is a consideration, no urinary symptoms to suggest UTI.    11:17 AM KUB shows constipation, no signs of obstruction.  Labs are unremarkable.  Blood sugar is on the high side, though this will be addressed to some extent with IV fluids, which are completing.  Patient has not had any vomiting here.  To treat his symptoms, he will have 3 days of MiraLAX, started on Colace, and prescribed Bentyl for abdominal cramps and Zofran for nausea.  He is strongly encouraged to follow-up with primary care, and  avoid sugar beverages, and to stick to a strict diabetic diet.      ADDITIONAL PROBLEM LIST  For diabetic control.    DISPOSITION AND DISCUSSIONS  I have discussed management of the patient with the following physicians and MANNY's:  None noted    Discussion of management with other QHP or appropriate source(s): None     Escalation of care considered, and ultimately not performed: acute inpatient care management, however at this time, the patient is most appropriate for outpatient management.    Barriers to care at this time, including but not limited to: Patient does not have established PCP.     Decision tools and prescription drugs considered including, but not limited to: Antibiotics I considered prescribing antibiotics, however I have not identified a bacterial source of infection.    The patient will return for new or worsening symptoms and is stable at the time of discharge.    The patient is referred to a primary physician for blood pressure management, diabetic screening, and for all other preventative health concerns.    DISPOSITION:  Patient will be discharged home in stable condition.    FOLLOW UP:  Your primary care provider.  Call today.    OUTPATIENT MEDICATIONS:  New Prescriptions    DICYCLOMINE (BENTYL) 20 MG TAB    Take 1 Tablet by mouth every 6 hours.    DOCUSATE SODIUM (COLACE) 100 MG CAP    Take 1 Capsule by mouth 2 times a day.    ONDANSETRON (ZOFRAN ODT) 4 MG TABLET DISPERSIBLE    Take 1 Tablet by mouth every 6 hours as needed for Nausea/Vomiting.    POLYETHYLENE GLYCOL/LYTES (MIRALAX) 17 G PACK    Take 1 Packet by mouth every day for 3 days.       FINAL DIAGNOSIS  1. Constipation, unspecified constipation type    2. Nausea and vomiting, unspecified vomiting type            I, Leonor Hawley), am scribing for, and in the presence of, Norm Fabian M.D..    Electronically signed by: Leonor Hawley), 9/15/2023    INorm M.D. personally performed the services described in  this documentation, as scribed by Leonor Jasso in my presence, and it is both accurate and complete.

## 2023-09-15 NOTE — DISCHARGE INSTRUCTIONS
Your tests here were reassuring.  There is no sign of a dangerous cause of your symptoms.  Your x-ray showed some constipation.  Use the MiraLAX and Colace we have prescribed for you.  For nausea and belly cramps, we have prescribed you a few days of Zofran and Bentyl.  Make sure you are paying very close attention to managing your blood sugar, and sticking to a strict diabetic diet.  Do not drink any soda or juice.  Stick to drinking clear, calorie free, sugar-free fluids.    Schedule a visit with your primary care provider.  If you do not have 1, use one of the clinics listed here.

## 2023-09-17 ENCOUNTER — APPOINTMENT (OUTPATIENT)
Dept: RADIOLOGY | Facility: MEDICAL CENTER | Age: 43
End: 2023-09-17
Attending: EMERGENCY MEDICINE
Payer: MEDICAID

## 2023-09-17 ENCOUNTER — HOSPITAL ENCOUNTER (EMERGENCY)
Facility: MEDICAL CENTER | Age: 43
End: 2023-09-17
Attending: EMERGENCY MEDICINE
Payer: MEDICAID

## 2023-09-17 VITALS
OXYGEN SATURATION: 97 % | TEMPERATURE: 97.7 F | RESPIRATION RATE: 18 BRPM | DIASTOLIC BLOOD PRESSURE: 84 MMHG | HEIGHT: 78 IN | WEIGHT: 259 LBS | HEART RATE: 55 BPM | BODY MASS INDEX: 29.97 KG/M2 | SYSTOLIC BLOOD PRESSURE: 132 MMHG

## 2023-09-17 DIAGNOSIS — Z79.4 OTHER SPECIFIED DIABETES MELLITUS WITH HYPERGLYCEMIA, WITH LONG-TERM CURRENT USE OF INSULIN (HCC): ICD-10-CM

## 2023-09-17 DIAGNOSIS — E13.65 OTHER SPECIFIED DIABETES MELLITUS WITH HYPERGLYCEMIA, WITH LONG-TERM CURRENT USE OF INSULIN (HCC): ICD-10-CM

## 2023-09-17 DIAGNOSIS — E11.9 TYPE 2 DIABETES MELLITUS WITHOUT COMPLICATION, WITHOUT LONG-TERM CURRENT USE OF INSULIN (HCC): ICD-10-CM

## 2023-09-17 LAB
ALBUMIN SERPL BCP-MCNC: 4.4 G/DL (ref 3.2–4.9)
ALBUMIN/GLOB SERPL: 2 G/DL
ALP SERPL-CCNC: 81 U/L (ref 30–99)
ALT SERPL-CCNC: 7 U/L (ref 2–50)
ANION GAP SERPL CALC-SCNC: 13 MMOL/L (ref 7–16)
AST SERPL-CCNC: 20 U/L (ref 12–45)
BASOPHILS # BLD AUTO: 1 % (ref 0–1.8)
BASOPHILS # BLD: 0.08 K/UL (ref 0–0.12)
BILIRUB SERPL-MCNC: 0.5 MG/DL (ref 0.1–1.5)
BUN SERPL-MCNC: 10 MG/DL (ref 8–22)
CALCIUM ALBUM COR SERPL-MCNC: 8.6 MG/DL (ref 8.5–10.5)
CALCIUM SERPL-MCNC: 8.9 MG/DL (ref 8.5–10.5)
CHLORIDE SERPL-SCNC: 97 MMOL/L (ref 96–112)
CO2 SERPL-SCNC: 24 MMOL/L (ref 20–33)
CREAT SERPL-MCNC: 0.91 MG/DL (ref 0.5–1.4)
EOSINOPHIL # BLD AUTO: 0.13 K/UL (ref 0–0.51)
EOSINOPHIL NFR BLD: 1.6 % (ref 0–6.9)
ERYTHROCYTE [DISTWIDTH] IN BLOOD BY AUTOMATED COUNT: 45.6 FL (ref 35.9–50)
GFR SERPLBLD CREATININE-BSD FMLA CKD-EPI: 108 ML/MIN/1.73 M 2
GLOBULIN SER CALC-MCNC: 2.2 G/DL (ref 1.9–3.5)
GLUCOSE BLD STRIP.AUTO-MCNC: 233 MG/DL (ref 65–99)
GLUCOSE BLD STRIP.AUTO-MCNC: 339 MG/DL (ref 65–99)
GLUCOSE SERPL-MCNC: 345 MG/DL (ref 65–99)
HCT VFR BLD AUTO: 33.5 % (ref 42–52)
HGB BLD-MCNC: 11.7 G/DL (ref 14–18)
IMM GRANULOCYTES # BLD AUTO: 0.04 K/UL (ref 0–0.11)
IMM GRANULOCYTES NFR BLD AUTO: 0.5 % (ref 0–0.9)
LYMPHOCYTES # BLD AUTO: 3.03 K/UL (ref 1–4.8)
LYMPHOCYTES NFR BLD: 37.5 % (ref 22–41)
MCH RBC QN AUTO: 31.7 PG (ref 27–33)
MCHC RBC AUTO-ENTMCNC: 34.9 G/DL (ref 32.3–36.5)
MCV RBC AUTO: 90.8 FL (ref 81.4–97.8)
MONOCYTES # BLD AUTO: 0.51 K/UL (ref 0–0.85)
MONOCYTES NFR BLD AUTO: 6.3 % (ref 0–13.4)
NEUTROPHILS # BLD AUTO: 4.28 K/UL (ref 1.82–7.42)
NEUTROPHILS NFR BLD: 53.1 % (ref 44–72)
NRBC # BLD AUTO: 0 K/UL
NRBC BLD-RTO: 0 /100 WBC (ref 0–0.2)
PLATELET # BLD AUTO: 295 K/UL (ref 164–446)
PMV BLD AUTO: 10.7 FL (ref 9–12.9)
POTASSIUM SERPL-SCNC: 3.8 MMOL/L (ref 3.6–5.5)
PROT SERPL-MCNC: 6.6 G/DL (ref 6–8.2)
RBC # BLD AUTO: 3.69 M/UL (ref 4.7–6.1)
SODIUM SERPL-SCNC: 134 MMOL/L (ref 135–145)
WBC # BLD AUTO: 8.1 K/UL (ref 4.8–10.8)

## 2023-09-17 PROCEDURE — 82962 GLUCOSE BLOOD TEST: CPT

## 2023-09-17 PROCEDURE — 85025 COMPLETE CBC W/AUTO DIFF WBC: CPT

## 2023-09-17 PROCEDURE — 36415 COLL VENOUS BLD VENIPUNCTURE: CPT

## 2023-09-17 PROCEDURE — 80053 COMPREHEN METABOLIC PANEL: CPT

## 2023-09-17 PROCEDURE — 700105 HCHG RX REV CODE 258: Mod: UD | Performed by: EMERGENCY MEDICINE

## 2023-09-17 PROCEDURE — 700102 HCHG RX REV CODE 250 W/ 637 OVERRIDE(OP): Mod: UD | Performed by: EMERGENCY MEDICINE

## 2023-09-17 PROCEDURE — 99284 EMERGENCY DEPT VISIT MOD MDM: CPT

## 2023-09-17 PROCEDURE — 73564 X-RAY EXAM KNEE 4 OR MORE: CPT | Mod: LT

## 2023-09-17 PROCEDURE — 96372 THER/PROPH/DIAG INJ SC/IM: CPT

## 2023-09-17 RX ORDER — SODIUM CHLORIDE 9 MG/ML
1000 INJECTION, SOLUTION INTRAVENOUS ONCE
Status: COMPLETED | OUTPATIENT
Start: 2023-09-17 | End: 2023-09-17

## 2023-09-17 RX ORDER — INSULIN LISPRO 100 [IU]/ML
2-12 INJECTION, SOLUTION INTRAVENOUS; SUBCUTANEOUS
Qty: 15 ML | Refills: 0 | Status: ACTIVE | OUTPATIENT
Start: 2023-09-17 | End: 2023-10-17

## 2023-09-17 RX ORDER — INSULIN GLARGINE 100 [IU]/ML
34 INJECTION, SOLUTION SUBCUTANEOUS EVERY MORNING
Qty: 15 ML | Refills: 0 | Status: ACTIVE | OUTPATIENT
Start: 2023-09-17 | End: 2023-10-27

## 2023-09-17 RX ADMIN — INSULIN HUMAN 4 UNITS: 100 INJECTION, SOLUTION PARENTERAL at 19:34

## 2023-09-17 RX ADMIN — SODIUM CHLORIDE 1000 ML: 9 INJECTION, SOLUTION INTRAVENOUS at 19:38

## 2023-09-17 ASSESSMENT — FIBROSIS 4 INDEX: FIB4 SCORE: 1.06

## 2023-09-18 NOTE — ED NOTES
Taken patient from triage waiting room, per WC,alert/ oriented x 4.Verified patient identification.  Assumed patient care.   Placed on patient room. Changed clothes to hospital gown. Connected to cardiac monitor.   Given the call light and instructed to call for any assistance needed/ or concerns.   Bed on lowest position, side rails up, breaks locked. Awaiting for ERP.

## 2023-09-18 NOTE — ED NOTES
Verbalized understanding on d/c instructions; all questions answered. All belongings with patient;
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
## 2023-09-18 NOTE — ED NOTES
Food provided to patient after insulin; dressing to left knee done; wound is pink; no drainage and doesn't look infected upon assessment
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
## 2023-09-18 NOTE — DISCHARGE INSTRUCTIONS
Are you go to [facility] tomorrow and catch up on your medical care plans for further outpatient management.  Return here if you require emergency medical care.  The abrasion on your left knee daily with soap and water and place a new dressing every day do not leave dressings on for multiple days at a time.

## 2023-09-21 ENCOUNTER — HOSPITAL ENCOUNTER (EMERGENCY)
Facility: MEDICAL CENTER | Age: 43
End: 2023-09-21
Attending: EMERGENCY MEDICINE
Payer: MEDICAID

## 2023-09-21 VITALS
DIASTOLIC BLOOD PRESSURE: 68 MMHG | RESPIRATION RATE: 18 BRPM | HEART RATE: 59 BPM | BODY MASS INDEX: 28.58 KG/M2 | WEIGHT: 247.36 LBS | SYSTOLIC BLOOD PRESSURE: 124 MMHG | TEMPERATURE: 97.4 F | OXYGEN SATURATION: 97 %

## 2023-09-21 DIAGNOSIS — Z59.00 HOMELESSNESS: ICD-10-CM

## 2023-09-21 DIAGNOSIS — E07.9 THYROID MASS: Primary | ICD-10-CM

## 2023-09-21 PROCEDURE — 99282 EMERGENCY DEPT VISIT SF MDM: CPT

## 2023-09-21 SDOH — ECONOMIC STABILITY - HOUSING INSECURITY: HOMELESSNESS UNSPECIFIED: Z59.00

## 2023-09-21 ASSESSMENT — FIBROSIS 4 INDEX: FIB4 SCORE: 1.05

## 2023-09-22 NOTE — ED TRIAGE NOTES
"Pt ambulatory to triage c/o sore throat difficulty breathing/swallowing. Pt states has hx of some sort of mass in neck and was seen her for this last month and is supposed to follow up but \"no one ever called\" him. Pt has some obvious swelling to front of neck but airway is patent. Pt has smooth even respirations and is managing secretions. No distress noted. vss  "

## 2023-09-22 NOTE — ED PROVIDER NOTES
ED Provider Note    Scribed for Richy Barnard by Desirae Baer. 9/21/2023  6:56 PM    Primary care provider: Pcp Pt States None  Means of arrival: walk in  History obtained from: Patient  History limited by: None    CHIEF COMPLAINT  Chief Complaint   Patient presents with    Difficulty Breathing       EXTERNAL RECORDS REVIEWED  Inpatient Notes patient was admitted for 4 days on August 13 of this year where he had IR biopsy of his neck mass as well as CT imaging.    HPI/ROS    HPI  Norm Prabhakar is a 43 y.o. male who presents to the Emergency Department complaining of shortness of breaths onset last night. The patient reports that he has a mass on his throat and it has been difficult to breath lately. He expresses that he is experiencing difficulty swallowing and increased swelling. Patient endorses that he tried to get in touch with ENT and they did not answer, which prompted him to report to the ED. No alleviating or exacerbating factors reported.     REVIEW OF SYSTEMS  As above, all other systems reviewed and are negative.   See HPI for further details.     PAST MEDICAL HISTORY   has a past medical history of Anxiety, ASTHMA, Bipolar 1 disorder ([facility]), Depression, Diabetes (HCC), Fall, Glaucoma, Glaucoma (1982), Hypothyroidism, Indigestion, Mental disorder, Murmur, Pneumonia, Psychiatric problem (2002), S/P thyroidectomy, Seizure ([facility]) (2010), Seizure disorder ([facility]), and Unspecified disorder of thyroid.  SURGICAL HISTORY   has a past surgical history that includes eye surgery; thyroid lobectomy; and other.  SOCIAL HISTORY  Social History     Tobacco Use    Smoking status: Former     Current packs/day: 0.00     Types: Cigarettes, Cigars     Quit date: 4/1/2020     Years since quitting: 3.4    Smokeless tobacco: Never   Vaping Use    Vaping Use: Former    Quit date: 9/1/2023    Substances: Nicotine, THC, CBD, Flavoring    Devices: Disposable   Substance Use Topics    Alcohol use: Not Currently     Comment:  "occasionally    Drug use: Yes     Types: Inhaled     Comment: marijuana every few days      Social History     Substance and Sexual Activity   Drug Use Yes    Types: Inhaled    Comment: marijuana every few days     FAMILY HISTORY  Family History   Problem Relation Age of Onset    Hypertension Mother     Heart Disease Mother     Lung Disease Mother     Stroke Maternal Grandmother      CURRENT MEDICATIONS  Home Medications       Reviewed by Estephania Cole R.N. (Registered Nurse) on 09/21/23 at 1804  Med List Status: Partial     Medication Last Dose Status   acetaminophen (TYLENOL) 500 MG Tab  Active   albuterol 108 (90 Base) MCG/ACT Aero Soln inhalation aerosol  Active   atorvastatin (LIPITOR) 10 MG Tab  Active   benzonatate (TESSALON) 100 MG Cap  Active   dicyclomine (BENTYL) 20 MG Tab  Active   divalproex (DEPAKOTE) 500 MG Tablet Delayed Response  Active   docusate sodium (COLACE) 100 MG Cap  Active   fenofibrate micronized (LOFIBRA) 67 MG capsule  Active   insulin glargine (LANTUS SOLOSTAR) 100 UNIT/ML Solution Pen-injector injection  Active   insulin lispro 100 UNIT/ML SC SOPN injection PEN  Active   Insulin Pen Needle 32 G x 4 mm  Active   levothyroxine (SYNTHROID) 100 MCG Tab  Active   levothyroxine (SYNTHROID) 125 MCG Tab  Active   lurasidone (LATUDA) 80 MG Tab  Active   mometasone-formoterol (DULERA) 200-5 MCG/ACT Aerosol  Active   ondansetron (ZOFRAN ODT) 4 MG TABLET DISPERSIBLE  Active   sertraline (ZOLOFT) 100 MG Tab  Active   traZODone (DESYREL) 50 MG Tab  Active                  ALLERGIES  Allergies   Allergen Reactions    Abilify      \"Feeling tired, like I don't even know whats going on around me\"    Fish      Pt reports salmon causes him to be sick to his stomach  Not listed on MAR noted 2/3/2021      Geodon [Ziprasidone Hcl] Anaphylaxis     Anaphylaxis per patient    Aripiprazole      \"I became lethargic.\"    Hydroxyzine Itching     Pt will only state \"I feel itchy when I take it    Ziprasidone  "       PHYSICAL EXAM    VITAL SIGNS:   Vitals:    09/21/23 1739 09/21/23 1758   BP: 127/83    Pulse: 80    Resp: 18    Temp: 36.4 °C (97.5 °F)    TempSrc: Temporal    SpO2: 97%    Weight:  112 kg (247 lb 5.7 oz)     Vitals: My interpretation: normotensive, not tachycardic, afebrile, not hypoxic    Reinterpretation of vitals: Unchanged unremarkable    PE:   Gen: sitting comfortably, speaking clearly, appears in no acute distress   ENT: Mucous membranes moist, posterior pharynx clear, uvula midline, nares patent bilaterally   Neck: Supple, FROM, there is a small midline anterior mobile neck mass present that is nontender, nonerythematous, swallowing well, controlling secretions, no phonation changes, no trismus, no signs of warmth or infectious process.  Pulmonary: Lungs are clear to auscultation bilaterally. No tachypnea  CV:  RRR, no murmur appreciated, pulses 2+ in both upper and lower extremities  Abdomen: soft, NT/ND; no rebound/guarding  : no CVA or suprapubic tenderness   Neuro: A&Ox4 (person, place, time, situation), speech fluent, gait steady, no focal deficits appreciated  Skin: No rash or lesions.  No pallor or jaundice.  No cyanosis.  Warm and dry.     COURSE & MEDICAL DECISION MAKING  Nursing notes, VS, PMSFHx, labs, imaging, EKG reviewed in chart.    ED Observation Status? No; Patient does not meet criteria for ED Observation.     Ddx: Thyroid mass, malignancy, infectious process    MDM: 6:56 PM Norm Prabhakar is a 43 y.o. male who presented with evaluation of known thyroid mass.  Patient is homeless, currently staying at the [facility].  He has had this mass for several months.  He was admitted in August of this year for 4-day stay where he had CT imaging showing a thyroid mass and IR biopsy showing no suggestion signs or symptoms of malignancy at that time.  He was told to follow-up with ENT for thyroidectomy.  Over the last few days he states that it has been bothering him again.  No difficulty  swallowing or breathing however, controlling secretions, no fevers or redness.  He is here for evaluation.  Unfortunately has not been able connect with the ENT services due to psychosocial challenges.  He arrives here with normal vital signs.  His physical exam is very reassuring, his neck is supple, he has full range of motion of the neck, he has a small midline anterior mobile mass is present that is nontender, nonerythematous, he is swallowing well, controlling secretions, no phonation changes, no trismus and no signs of warmth or infectious process.  Appears stable from prior evaluations on documentation.  At this time I encouraged him to follow-up with his outpatient team for further evaluation and treatment.  I put in a referral for ENT for him.  He does have a  encouraged him to utilize their services and have them help him schedule an appointment for surgical evaluation.  He has no other complaints, no vital signs and is in no acute distress.  We will plan to discharge him home with strict return precautions outpatient follow-up plan.  Again there is no signs of an obstructing process at this time.    ADDITIONAL PROBLEM LIST AND DISPOSITION    I have discussed management of the patient with the following physicians and MANNY's: None    Discussion of management with other QHP or appropriate source(s): None     Escalation of care considered, and ultimately not performed:IV fluids, blood analysis, and diagnostic imaging    Barriers to care at this time, including but not limited to: Patient does not have established PCP and Patient is homeless.     Decision tools and prescription drugs considered including, but not limited to: Pain Medications Tylenol Motrin .    FINAL IMPRESSION  1. Thyroid mass Acute   2. Homelessness Acute       Desirae ROB (Stephanie), am scribing for, and in the presence of, Richy Barnard.    Electronically signed by: Desirae Baer (Scribe), 9/21/2023    Richy ROB  Akil personally performed the services described in this documentation, as scribed by Desirae Baer in my presence, and it is both accurate and complete.    The note accurately reflects work and decisions made by me.  Richy Barnard  9/21/2023  7:32 PM

## 2023-09-22 NOTE — DISCHARGE INSTRUCTIONS
It is important that you talk to your  about getting you set up with an appointment with ENT.  I gave the name and number of our ENT above and he can call their office to make an appointment.  I also put in the referral for you so somebody should contact you.  This does need to be followed up on not emergently.  If you have any worsening or concerning symptoms please return to the ED.  Thank you for coming in today.

## 2023-09-22 NOTE — ED NOTES
Discharge instructions discussed with pt, verbalizes understanding. All belongings with pt when leaving unit. Pt amb to lobby steady gait.

## 2023-09-29 ENCOUNTER — HOSPITAL ENCOUNTER (EMERGENCY)
Facility: MEDICAL CENTER | Age: 43
End: 2023-09-29
Attending: EMERGENCY MEDICINE
Payer: MEDICAID

## 2023-09-29 VITALS
HEART RATE: 84 BPM | HEIGHT: 78 IN | BODY MASS INDEX: 29.51 KG/M2 | SYSTOLIC BLOOD PRESSURE: 121 MMHG | TEMPERATURE: 98.9 F | WEIGHT: 255.07 LBS | OXYGEN SATURATION: 96 % | RESPIRATION RATE: 16 BRPM | DIASTOLIC BLOOD PRESSURE: 84 MMHG

## 2023-09-29 DIAGNOSIS — L02.11 NECK ABSCESS: Primary | ICD-10-CM

## 2023-09-29 DIAGNOSIS — L03.011 PARONYCHIA OF FINGER OF RIGHT HAND: ICD-10-CM

## 2023-09-29 PROCEDURE — 700102 HCHG RX REV CODE 250 W/ 637 OVERRIDE(OP): Mod: UD | Performed by: EMERGENCY MEDICINE

## 2023-09-29 PROCEDURE — 99283 EMERGENCY DEPT VISIT LOW MDM: CPT

## 2023-09-29 PROCEDURE — 306637 HCHG MISC ORTHO ITEM RC 0274

## 2023-09-29 PROCEDURE — 303977 HCHG I & D

## 2023-09-29 PROCEDURE — A9270 NON-COVERED ITEM OR SERVICE: HCPCS | Mod: UD | Performed by: EMERGENCY MEDICINE

## 2023-09-29 PROCEDURE — 700101 HCHG RX REV CODE 250: Mod: UD | Performed by: EMERGENCY MEDICINE

## 2023-09-29 PROCEDURE — 26010 DRAINAGE OF FINGER ABSCESS: CPT

## 2023-09-29 RX ORDER — SULFAMETHOXAZOLE AND TRIMETHOPRIM 800; 160 MG/1; MG/1
1 TABLET ORAL 2 TIMES DAILY
Qty: 14 TABLET | Refills: 0 | Status: ACTIVE | OUTPATIENT
Start: 2023-09-29 | End: 2023-10-06

## 2023-09-29 RX ORDER — LIDOCAINE AND PRILOCAINE 25; 25 MG/G; MG/G
CREAM TOPICAL ONCE
Status: COMPLETED | OUTPATIENT
Start: 2023-09-29 | End: 2023-09-29

## 2023-09-29 RX ORDER — SULFAMETHOXAZOLE AND TRIMETHOPRIM 800; 160 MG/1; MG/1
1 TABLET ORAL ONCE
Status: COMPLETED | OUTPATIENT
Start: 2023-09-29 | End: 2023-09-29

## 2023-09-29 RX ADMIN — LIDOCAINE AND PRILOCAINE 1 G: 25; 25 CREAM TOPICAL at 17:33

## 2023-09-29 RX ADMIN — SULFAMETHOXAZOLE AND TRIMETHOPRIM 1 TABLET: 800; 160 TABLET ORAL at 17:33

## 2023-09-29 ASSESSMENT — FIBROSIS 4 INDEX: FIB4 SCORE: 1.05

## 2023-09-29 NOTE — ED TRIAGE NOTES
Chief Complaint   Patient presents with    Wound Check     Wound to the right side of his neck. Pt states he popped a pimple and became inflamed shortly after. Reports discharge the last couple of days. Slight redness to the area, warm to touch. Denies fevers or chills.       Patient ambulatory to triage for above complaint. A&Ox4, speaking in full sentences. Pt educated on triage process, placed back in lobby, and instructed to inform staff of any changes.

## 2023-09-30 NOTE — ED NOTES
Patient discharged home per ERP.  Discharge teaching and education discussed with patient. POC discussed.   Patient verbalized understanding of discharge teaching and education. No other questions at this time.     RX for bactrim sent to pt's pharmacy.

## 2023-09-30 NOTE — ED PROVIDER NOTES
ER Provider Note    Scribed for Adrian Huizar Ii, M.d. by Torri Abreu. 9/29/2023  5:18 PM    Primary Care Provider: Pcp Pt States None    CHIEF COMPLAINT  Chief Complaint   Patient presents with    Wound Check     Wound to the right side of his neck. Pt states he popped a pimple and became inflamed shortly after. Reports discharge the last couple of days. Slight redness to the area, warm to touch. Denies fevers or chills.      EXTERNAL RECORDS REVIEWED  Inpatient Notes The patient was admitted to the ED for a neck mass, which was biopsied and came back as benign follicular tissue.     HPI/ROS  LIMITATION TO HISTORY   Select: : None  OUTSIDE HISTORIAN(S):  None.    Norm Prabhakar is a 43 y.o. male who presents to the ED for evaluation of a wound check onset 1 week ago. He describes that he popped a pimple on his neck and it became inflamed shortly after. The patient states he also has discharge, and slight redness, but denies any fevers or chills. He states that at times there is pain when he swallows, but denies any difficulty swallowing. He also mentions that his pain is exacerbated when he is sleeping. The patient also presents with redness and swelling to his right index finger. He notes he tried to pull off some dead skin and has since had the erythema and edema.     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    Hypothyroidism     Indigestion     once in a while    Mental disorder     learning disabilities; speech impairment; developmental delays    Murmur     since birth    Pneumonia     remote    Psychiatric problem 2002    PTSD    S/P thyroidectomy     Seizure (HCC) 2010    Seizure disorder ([facility])     Unspecified disorder of thyroid      SURGICAL  HISTORY  Past Surgical History:   Procedure Laterality Date    EYE SURGERY      OTHER      Hernia Repair when he was 10 yrs old    THYROID LOBECTOMY       FAMILY HISTORY  Family History   Problem Relation Age of Onset    Hypertension Mother     Heart Disease Mother     Lung Disease Mother     Stroke Maternal Grandmother      SOCIAL HISTORY   reports that he quit smoking about 3 years ago. His smoking use included cigarettes and cigars. He has never used smokeless tobacco. He reports that he does not currently use alcohol. He reports current drug use. Drug: Inhaled.    CURRENT MEDICATIONS  Discharge Medication List as of 9/29/2023  6:46 PM        CONTINUE these medications which have NOT CHANGED    Details   insulin glargine (LANTUS SOLOSTAR) 100 UNIT/ML Solution Pen-injector injection Inject 34 Units under the skin every morning., Disp-15 mL, R-0, Normal      insulin lispro 100 UNIT/ML SC SOPN injection PEN Inject 2-12 Units under the skin 4 Times a Day,Before Meals and at Bedtime for 30 days., Disp-15 mL, R-0, Normal      ondansetron (ZOFRAN ODT) 4 MG TABLET DISPERSIBLE Take 1 Tablet by mouth every 6 hours as needed for Nausea/Vomiting., Disp-10 Tablet, R-0, Normal      dicyclomine (BENTYL) 20 MG Tab Take 1 Tablet by mouth every 6 hours., Disp-20 Tablet, R-0, Normal      docusate sodium (COLACE) 100 MG Cap Take 1 Capsule by mouth 2 times a day., Disp-60 Capsule, R-0, Normal      acetaminophen (TYLENOL) 500 MG Tab Take 1 Tablet by mouth every 6 hours as needed for Mild Pain or Moderate Pain., OTC      atorvastatin (LIPITOR) 10 MG Tab Take 1 Tablet by mouth every evening., Disp-30 Tablet, R-3, Normal      benzonatate (TESSALON) 100 MG Cap Take 1 Capsule by mouth 3 times a day as needed for Cough., Disp-60 Capsule, R-0, Normal      fenofibrate micronized (LOFIBRA) 67 MG capsule Take 1 Capsule by mouth every day., Disp-30 Capsule, R-1, Normal      mometasone-formoterol (DULERA) 200-5 MCG/ACT Aerosol Inhale 2 Puffs 2  "times a day., Disp-13 g, R-1, Normal      Insulin Pen Needle 32 G x 4 mm Use one pen needle in pen device to inject insulin five times daily.Per patient/formulary preference. ICD-10 code: E11.65 Uncontrolled type 2 Diabetes MellitusDisp-500 Each, R-0, Normal      !! levothyroxine (SYNTHROID) 125 MCG Tab Take 125 mcg by mouth every morning on an empty stomach. Total of 225 mcg once a day, Historical Med      traZODone (DESYREL) 50 MG Tab Take 50 mg by mouth every evening., Historical Med      lurasidone (LATUDA) 80 MG Tab Take 80 mg by mouth with dinner., Historical Med      sertraline (ZOLOFT) 100 MG Tab Take 100 mg by mouth every day., Historical Med      albuterol 108 (90 Base) MCG/ACT Aero Soln inhalation aerosol Inhale 2 Puffs every 6 hours as needed (cough)., Disp-8.5 g, R-0, Normal      !! levothyroxine (SYNTHROID) 100 MCG Tab Take 100 mcg by mouth every morning on an empty stomach. Total of 225 mcg once a day, Historical Med      divalproex (DEPAKOTE) 500 MG Tablet Delayed Response Take 500-1,500 mg by mouth 2 times a day. 500 mg in AM and 1500 mg at night, Historical Med       !! - Potential duplicate medications found. Please discuss with provider.        ALLERGIES  Abilify, Fish, Geodon [ziprasidone hcl], Aripiprazole, Hydroxyzine, and Ziprasidone    PHYSICAL EXAM  /72   Pulse 80   Temp 37.4 °C (99.3 °F) (Temporal)   Resp 16   Ht 1.981 m (6' 6\")   Wt 116 kg (255 lb 1.2 oz)   SpO2 97%   BMI 29.48 kg/m²     Physical Exam  Vitals and nursing note reviewed.   HENT:      Head: Normocephalic.      Right Ear: Tympanic membrane normal.      Left Ear: Tympanic membrane normal.      Nose: No congestion.      Mouth/Throat:      Mouth: Mucous membranes are moist.   Eyes:      Extraocular Movements: Extraocular movements intact.      Pupils: Pupils are equal, round, and reactive to light.   Neck:      Comments: Right lateral neck several centimeters below mandible is a raised indurated area with crusting " and with pressure there are a couple pin point areas that drain pus. Voice normal. Swallowing without difficulty. No stridor. Normal range of motion. There is a rubbery mobile anterior/midline neck mass that is previously known (benign)  Cardiovascular:      Rate and Rhythm: Normal rate.   Pulmonary:      Effort: Pulmonary effort is normal.   Musculoskeletal:      Comments: Right index finger with paronychia.    Neurological:      Mental Status: He is alert.        DIAGNOSTIC STUDIES    Incision and Drainage Procedure Note    Indication: Abscess at right neck, subcutaneous    Procedure: The patient was positioned appropriately and the skin over the incision site was prepped with betadine and draped in a sterile fashion. Local anesthesia was obtained by infiltration using EMLA.   An incision was then made over the punctuate area of drainage with an 11 blade and approximately 1 cc of purulent material was expressed. Loculations were broken up using forceps but no more material was returned. The drainage cavity was then dressed with a sterile dressing and gauze. 11 blade  The patient tolerated the procedure well.    Complications: None         Incision and Drainage Procedure Note    Indication: Paronychia of right index finger    Procedure: The patient was positioned appropriately and the skin over the incision site was prepped with betadine and draped in a sterile fashion. Local anesthesia was obtained by infiltration using EMLA.  An incision was then made at the cuticle edge and scant purulent material was expressed. Wound covered with abx ointment then dressed with a sterile dressing and gauze.   The patient tolerated the procedure well.    Complications: None     COURSE & MEDICAL DECISION MAKING     ED Observation Status? No; Patient does not meet criteria for ED Observation.     INITIAL ASSESSMENT, COURSE AND PLAN  Care Narrative:     5:19 PM - Patient seen and examined at bedside. The patient is a 43 year old  male who presents with a small subcutaneous abscess at his right lateral neck.  The patient notes that he is having discharge come out of his neck. He also notes that he has redness and swelling to his right index finger that showed up after he peeled some of his skin off. I informed the patient that both abscesses will be drained; subcutaneous abscess at right neck and right index finger paronychia. Will place EMLA on both areas. Right neck SC abscess already with some drainage, will open further.  Patient agrees to the plan of care. The patient will be medicated with Bactrim.     6:37 PM - Performed the incision and drainage procedures on both the patient's neck and his right index finger. Discussed discharge instructions and return precautions with the patient and they were cleared for discharge. Patient was given the opportunity to ask any further questions. He is comfortable with discharge at this time.   Prescription for bactrim sent to pharmacy    PROBLEM LIST  #Subcutaneous neck abscess    #Paronychia right index finger    DISPOSITION AND DISCUSSIONS  I have discussed management of the patient with the following physicians and MANNY's:  None.    Discussion of management with other QHP or appropriate source(s): None     Barriers to care at this time, including but not limited to: Patient does not have established PCP.     The patient will return for new or worsening symptoms and is stable at the time of discharge.    DISPOSITION:  Patient will be discharged home in stable condition.    FOLLOW UP:  [facility], Emergency Dept  [address] [address] [phone]    If symptoms worsen if worsening over next 2 days, trouble swallowing, trouble breathing, more swelling, fevers come back to the ER    OUTPATIENT MEDICATIONS:  Discharge Medication List as of 9/29/2023  6:46 PM        START taking these medications    Details   sulfamethoxazole-trimethoprim (BACTRIM DS) 800-160 MG  tablet Take 1 Tablet by mouth 2 times a day for 7 days., Disp-14 Tablet, R-0, Normal            FINAL DIAGNOSIS  1. Neck abscess    2. Paronychia of finger of right hand       ITorri (Scribe), am scribing for, and in the presence of, JULITO Grande II.    Electronically signed by: Torri Abreu (Robertibe), 9/29/2023    IAdrian II, M* personally performed the services described in this documentation, as scribed by Torri Abreu in my presence, and it is both accurate and complete.      The note accurately reflects work and decisions made by me.  Adrian Huizar II, M.D.  9/29/2023  8:13 PM

## 2023-09-30 NOTE — ED NOTES
Wounds to R lateral neck & R pointer finger drained by Dr. Huizar, antibiotic ointment applied & wounds dressed.

## 2023-10-02 ENCOUNTER — HOSPITAL ENCOUNTER (EMERGENCY)
Facility: MEDICAL CENTER | Age: 43
End: 2023-10-02
Attending: EMERGENCY MEDICINE
Payer: MEDICAID

## 2023-10-02 VITALS
OXYGEN SATURATION: 93 % | WEIGHT: 255 LBS | TEMPERATURE: 98.5 F | HEART RATE: 53 BPM | DIASTOLIC BLOOD PRESSURE: 56 MMHG | SYSTOLIC BLOOD PRESSURE: 99 MMHG | BODY MASS INDEX: 29.5 KG/M2 | RESPIRATION RATE: 18 BRPM | HEIGHT: 78 IN

## 2023-10-02 DIAGNOSIS — R73.9 HYPERGLYCEMIA: ICD-10-CM

## 2023-10-02 LAB
ALBUMIN SERPL BCP-MCNC: 4.1 G/DL (ref 3.2–4.9)
ALBUMIN/GLOB SERPL: 1.8 G/DL
ALP SERPL-CCNC: 76 U/L (ref 30–99)
ALT SERPL-CCNC: 9 U/L (ref 2–50)
ANION GAP SERPL CALC-SCNC: 15 MMOL/L (ref 7–16)
AST SERPL-CCNC: 23 U/L (ref 12–45)
B-OH-BUTYR SERPL-MCNC: 0.13 MMOL/L (ref 0.02–0.27)
BASE EXCESS BLDV CALC-SCNC: 0 MMOL/L
BASOPHILS # BLD AUTO: 0.9 % (ref 0–1.8)
BASOPHILS # BLD: 0.08 K/UL (ref 0–0.12)
BILIRUB SERPL-MCNC: 0.2 MG/DL (ref 0.1–1.5)
BODY TEMPERATURE: 36.4 CENTIGRADE
BUN SERPL-MCNC: 18 MG/DL (ref 8–22)
CALCIUM ALBUM COR SERPL-MCNC: 9.1 MG/DL (ref 8.5–10.5)
CALCIUM SERPL-MCNC: 9.2 MG/DL (ref 8.5–10.5)
CHLORIDE SERPL-SCNC: 99 MMOL/L (ref 96–112)
CO2 SERPL-SCNC: 22 MMOL/L (ref 20–33)
CREAT SERPL-MCNC: 1.16 MG/DL (ref 0.5–1.4)
EOSINOPHIL # BLD AUTO: 0.13 K/UL (ref 0–0.51)
EOSINOPHIL NFR BLD: 1.5 % (ref 0–6.9)
ERYTHROCYTE [DISTWIDTH] IN BLOOD BY AUTOMATED COUNT: 46 FL (ref 35.9–50)
EST. AVERAGE GLUCOSE BLD GHB EST-MCNC: 275 MG/DL
GFR SERPLBLD CREATININE-BSD FMLA CKD-EPI: 81 ML/MIN/1.73 M 2
GLOBULIN SER CALC-MCNC: 2.3 G/DL (ref 1.9–3.5)
GLUCOSE BLD STRIP.AUTO-MCNC: 236 MG/DL (ref 65–99)
GLUCOSE BLD STRIP.AUTO-MCNC: 271 MG/DL (ref 65–99)
GLUCOSE SERPL-MCNC: 295 MG/DL (ref 65–99)
HBA1C MFR BLD: 11.2 % (ref 4–5.6)
HCO3 BLDV-SCNC: 24 MMOL/L (ref 24–28)
HCT VFR BLD AUTO: 32.7 % (ref 42–52)
HGB BLD-MCNC: 11.2 G/DL (ref 14–18)
IMM GRANULOCYTES # BLD AUTO: 0.1 K/UL (ref 0–0.11)
IMM GRANULOCYTES NFR BLD AUTO: 1.1 % (ref 0–0.9)
INHALED O2 FLOW RATE: NO L/MIN
LIPASE SERPL-CCNC: 16 U/L (ref 11–82)
LYMPHOCYTES # BLD AUTO: 2.51 K/UL (ref 1–4.8)
LYMPHOCYTES NFR BLD: 28.5 % (ref 22–41)
MAGNESIUM SERPL-MCNC: 1.8 MG/DL (ref 1.5–2.5)
MCH RBC QN AUTO: 31.7 PG (ref 27–33)
MCHC RBC AUTO-ENTMCNC: 34.3 G/DL (ref 32.3–36.5)
MCV RBC AUTO: 92.6 FL (ref 81.4–97.8)
MONOCYTES # BLD AUTO: 0.84 K/UL (ref 0–0.85)
MONOCYTES NFR BLD AUTO: 9.5 % (ref 0–13.4)
NEUTROPHILS # BLD AUTO: 5.16 K/UL (ref 1.82–7.42)
NEUTROPHILS NFR BLD: 58.5 % (ref 44–72)
NRBC # BLD AUTO: 0 K/UL
NRBC BLD-RTO: 0 /100 WBC (ref 0–0.2)
OSMOLALITY SERPL: 298 MOSM/KG H2O (ref 278–298)
PCO2 BLDV: 34 MMHG (ref 41–51)
PCO2 TEMP ADJ BLDV: 33.1 MMHG (ref 41–51)
PH BLDV: 7.46 [PH] (ref 7.31–7.45)
PH TEMP ADJ BLDV: 7.47 [PH] (ref 7.31–7.45)
PHOSPHATE SERPL-MCNC: 3.1 MG/DL (ref 2.5–4.5)
PLATELET # BLD AUTO: 320 K/UL (ref 164–446)
PMV BLD AUTO: 10.5 FL (ref 9–12.9)
PO2 BLDV: 48.3 MMHG (ref 25–40)
PO2 TEMP ADJ BLDV: 46.3 MMHG (ref 25–40)
POTASSIUM SERPL-SCNC: 4.1 MMOL/L (ref 3.6–5.5)
PROT SERPL-MCNC: 6.4 G/DL (ref 6–8.2)
RBC # BLD AUTO: 3.53 M/UL (ref 4.7–6.1)
SAO2 % BLDV: 83.5 %
SODIUM SERPL-SCNC: 136 MMOL/L (ref 135–145)
WBC # BLD AUTO: 8.8 K/UL (ref 4.8–10.8)

## 2023-10-02 PROCEDURE — 83930 ASSAY OF BLOOD OSMOLALITY: CPT

## 2023-10-02 PROCEDURE — 83735 ASSAY OF MAGNESIUM: CPT

## 2023-10-02 PROCEDURE — 36415 COLL VENOUS BLD VENIPUNCTURE: CPT

## 2023-10-02 PROCEDURE — 96374 THER/PROPH/DIAG INJ IV PUSH: CPT

## 2023-10-02 PROCEDURE — 84100 ASSAY OF PHOSPHORUS: CPT

## 2023-10-02 PROCEDURE — 700102 HCHG RX REV CODE 250 W/ 637 OVERRIDE(OP): Mod: UD | Performed by: EMERGENCY MEDICINE

## 2023-10-02 PROCEDURE — 700105 HCHG RX REV CODE 258: Mod: UD | Performed by: EMERGENCY MEDICINE

## 2023-10-02 PROCEDURE — 82962 GLUCOSE BLOOD TEST: CPT | Mod: 91

## 2023-10-02 PROCEDURE — 82803 BLOOD GASES ANY COMBINATION: CPT

## 2023-10-02 PROCEDURE — 83690 ASSAY OF LIPASE: CPT

## 2023-10-02 PROCEDURE — 85025 COMPLETE CBC W/AUTO DIFF WBC: CPT

## 2023-10-02 PROCEDURE — 83036 HEMOGLOBIN GLYCOSYLATED A1C: CPT

## 2023-10-02 PROCEDURE — 99284 EMERGENCY DEPT VISIT MOD MDM: CPT

## 2023-10-02 PROCEDURE — 82010 KETONE BODYS QUAN: CPT

## 2023-10-02 PROCEDURE — 80053 COMPREHEN METABOLIC PANEL: CPT

## 2023-10-02 RX ORDER — SODIUM CHLORIDE 9 MG/ML
1000 INJECTION, SOLUTION INTRAVENOUS ONCE
Status: COMPLETED | OUTPATIENT
Start: 2023-10-02 | End: 2023-10-02

## 2023-10-02 RX ADMIN — SODIUM CHLORIDE 1000 ML: 9 INJECTION, SOLUTION INTRAVENOUS at 09:45

## 2023-10-02 RX ADMIN — INSULIN HUMAN 5 UNITS: 100 INJECTION, SOLUTION PARENTERAL at 12:21

## 2023-10-02 ASSESSMENT — FIBROSIS 4 INDEX: FIB4 SCORE: 1.05

## 2023-10-02 NOTE — ED NOTES
Patient resting with equal rise and fall of chest. Patient awakes and denies any needs at this time then falls back to sleep.

## 2023-10-02 NOTE — ED PROVIDER NOTES
ED Provider Note    CHIEF COMPLAINT  Chief Complaint   Patient presents with    High Blood Sugar     Patient reports he was at work when he developed a head ache. Patients blood sugar was 410 per EMS. Patient reports he does not taking insuline to work due to need to be kept cold. Patient reports he ate last at 0530 this morning.         EXTERNAL RECORDS REVIEWED  Inpatient Notes recent ER evaluations for neck abscess as well as thyroid mass and hyperglycemia.  The street visits have been within the last month    HPI/ROS  LIMITATION TO HISTORY   Select: : None  OUTSIDE HISTORIAN(S):  None    Norm Prabhakar is a 43 y.o. male who presents to the emergency [place] for elevated blood sugar.  Past medical history as document below.  Patient explains that he works for [employer] and tonight while at work he became unwell and that he had slight headache and felt like his sugars were high.  He notes that he does not bring his insulin to work due to inability to refrigerate.  Also admits that he ate some [employer] peanut butter cups at the beginning of his shift secondary to low blood sugars when he arrived to work.  He then had a hot pocket for his lunch break and is now with high blood sugar.  He is unsure of his regular insulin dosing.    PAST MEDICAL HISTORY   has a past medical history of Anxiety, ASTHMA, Bipolar 1 disorder ([facility]), Depression, Diabetes (HCC), Fall, Glaucoma, Glaucoma (1982), Hypothyroidism, Indigestion, Mental disorder, Murmur, Pneumonia, Psychiatric problem (2002), S/P thyroidectomy, Seizure (HCC) (2010), Seizure disorder ([facility]), and Unspecified disorder of thyroid.    SURGICAL HISTORY   has a past surgical history that includes eye surgery; thyroid lobectomy; and other.    FAMILY HISTORY  Family History   Problem Relation Age of Onset    Hypertension Mother     Heart Disease Mother     Lung Disease Mother     Stroke Maternal Grandmother        SOCIAL HISTORY  Social History     Tobacco Use    Smoking  "status: Former     Current packs/day: 0.00     Types: Cigarettes, Cigars     Quit date: 4/1/2020     Years since quitting: 3.5    Smokeless tobacco: Never   Vaping Use    Vaping Use: Former    Quit date: 9/1/2023    Substances: Nicotine, THC, CBD, Flavoring    Devices: Disposable   Substance and Sexual Activity    Alcohol use: Not Currently     Comment: occasionally    Drug use: Not Currently     Types: Inhaled     Comment: marijuana every few days    Sexual activity: Not Currently       CURRENT MEDICATIONS  Home Medications       Reviewed by Gabrielle Cuello R.N. (Registered Nurse) on 10/02/23 at 0919  Med List Status: Partial     Medication Last Dose Status   acetaminophen (TYLENOL) 500 MG Tab  Active   albuterol 108 (90 Base) MCG/ACT Aero Soln inhalation aerosol  Active   atorvastatin (LIPITOR) 10 MG Tab  Active   benzonatate (TESSALON) 100 MG Cap  Active   dicyclomine (BENTYL) 20 MG Tab  Active   divalproex (DEPAKOTE) 500 MG Tablet Delayed Response  Active   docusate sodium (COLACE) 100 MG Cap  Active   fenofibrate micronized (LOFIBRA) 67 MG capsule  Active   insulin glargine (LANTUS SOLOSTAR) 100 UNIT/ML Solution Pen-injector injection  Active   insulin lispro 100 UNIT/ML SC SOPN injection PEN  Active   Insulin Pen Needle 32 G x 4 mm  Active   levothyroxine (SYNTHROID) 100 MCG Tab  Active   levothyroxine (SYNTHROID) 125 MCG Tab  Active   lurasidone (LATUDA) 80 MG Tab  Active   mometasone-formoterol (DULERA) 200-5 MCG/ACT Aerosol  Active   ondansetron (ZOFRAN ODT) 4 MG TABLET DISPERSIBLE  Active   sertraline (ZOLOFT) 100 MG Tab  Active   sulfamethoxazole-trimethoprim (BACTRIM DS) 800-160 MG tablet  Active   traZODone (DESYREL) 50 MG Tab  Active                    ALLERGIES  Allergies   Allergen Reactions    Abilify      \"Feeling tired, like I don't even know whats going on around me\"    Fish      Pt reports salmon causes him to be sick to his stomach  Not listed on MAR noted 2/3/2021      Geodon [Ziprasidone Hcl] " "Anaphylaxis     Anaphylaxis per patient    Aripiprazole      \"I became lethargic.\"    Hydroxyzine Itching     Pt will only state \"I feel itchy when I take it    Ziprasidone        PHYSICAL EXAM  VITAL SIGNS: /66   Pulse (!) 55   Temp 36.9 °C (98.5 °F) (Temporal)   Resp 16   Ht 1.981 m (6' 6\")   Wt 116 kg (255 lb)   SpO2 96%   BMI 29.47 kg/m²      Pulse ox interpretation: I interpret this pulse ox as normal.  Constitutional: Alert in no apparent distress.  HENT: No signs of trauma, Bilateral external ears normal, Nose normal.   Eyes: Blind left eye.  Neck: Normal range of motion, No tenderness, Supple  Cardiovascular: Regular rate and rhythm, no murmurs.   Thorax & Lungs: Normal breath sounds, No respiratory distress, No wheezing, No chest tenderness.   Abdomen: Bowel sounds normal, Soft, No tenderness  Skin: Warm, Dry, No erythema, No rash.   Extremities: Intact distal pulses  Musculoskeletal: Good range of motion in all major joints. No tenderness to palpation or major deformities noted.   Neurologic: Alert , Normal motor function, Normal sensory function, No focal deficits noted.   Psychiatric: Affect normal, Judgment normal, Mood normal.         DIAGNOSTIC STUDIES / PROCEDURES      LABS  Results for orders placed or performed during the hospital encounter of 10/02/23   MAGNESIUM   Result Value Ref Range    Magnesium 1.8 1.5 - 2.5 mg/dL   PHOSPHORUS   Result Value Ref Range    Phosphorus 3.1 2.5 - 4.5 mg/dL   BETA-HYDROXYBUTYRIC ACID   Result Value Ref Range    beta-Hydroxybutyric Acid 0.13 0.02 - 0.27 mmol/L   HEMOGLOBIN A1C   Result Value Ref Range    Glycohemoglobin 11.2 (H) 4.0 - 5.6 %    Est Avg Glucose 275 mg/dL   LIPASE   Result Value Ref Range    Lipase 16 11 - 82 U/L   OSMOLALITY SERUM   Result Value Ref Range    Osmolality Serum 298 278 - 298 mOsm/kg H2O   VENOUS BLOOD GAS   Result Value Ref Range    Venous Bg Ph 7.46 (H) 7.31 - 7.45    Venous Bg Ph Temp Corrected 7.47 (H) 7.31 - 7.45    " Venous Bg Pco2 34.0 (L) 41.0 - 51.0 mmHg    Venous Bg Pco2 Temp Corrected 33.1 (L) 41.0 - 51.0 mmHg    Venous Bg Po2 48.3 (H) 25.0 - 40.0 mmHg    Venous Bg Po2 Temp Corrected 46.3 (H) 25.0 - 40.0 mmHg    Venous Bg O2 Saturation 83.5 %    Venous Bg Hco3 24 24 - 28 mmol/L    Venous Bg Base Excess 0 mmol/L    Body Temp 36.4 Centigrade    O2 Therapy no    Comp Metabolic Panel   Result Value Ref Range    Sodium 136 135 - 145 mmol/L    Potassium 4.1 3.6 - 5.5 mmol/L    Chloride 99 96 - 112 mmol/L    Co2 22 20 - 33 mmol/L    Anion Gap 15.0 7.0 - 16.0    Glucose 295 (H) 65 - 99 mg/dL    Bun 18 8 - 22 mg/dL    Creatinine 1.16 0.50 - 1.40 mg/dL    Calcium 9.2 8.5 - 10.5 mg/dL    Correct Calcium 9.1 8.5 - 10.5 mg/dL    AST(SGOT) 23 12 - 45 U/L    ALT(SGPT) 9 2 - 50 U/L    Alkaline Phosphatase 76 30 - 99 U/L    Total Bilirubin 0.2 0.1 - 1.5 mg/dL    Albumin 4.1 3.2 - 4.9 g/dL    Total Protein 6.4 6.0 - 8.2 g/dL    Globulin 2.3 1.9 - 3.5 g/dL    A-G Ratio 1.8 g/dL   CBC WITH DIFFERENTIAL   Result Value Ref Range    WBC 8.8 4.8 - 10.8 K/uL    RBC 3.53 (L) 4.70 - 6.10 M/uL    Hemoglobin 11.2 (L) 14.0 - 18.0 g/dL    Hematocrit 32.7 (L) 42.0 - 52.0 %    MCV 92.6 81.4 - 97.8 fL    MCH 31.7 27.0 - 33.0 pg    MCHC 34.3 32.3 - 36.5 g/dL    RDW 46.0 35.9 - 50.0 fL    Platelet Count 320 164 - 446 K/uL    MPV 10.5 9.0 - 12.9 fL    Neutrophils-Polys 58.50 44.00 - 72.00 %    Lymphocytes 28.50 22.00 - 41.00 %    Monocytes 9.50 0.00 - 13.40 %    Eosinophils 1.50 0.00 - 6.90 %    Basophils 0.90 0.00 - 1.80 %    Immature Granulocytes 1.10 (H) 0.00 - 0.90 %    Nucleated RBC 0.00 0.00 - 0.20 /100 WBC    Neutrophils (Absolute) 5.16 1.82 - 7.42 K/uL    Lymphs (Absolute) 2.51 1.00 - 4.80 K/uL    Monos (Absolute) 0.84 0.00 - 0.85 K/uL    Eos (Absolute) 0.13 0.00 - 0.51 K/uL    Baso (Absolute) 0.08 0.00 - 0.12 K/uL    Immature Granulocytes (abs) 0.10 0.00 - 0.11 K/uL    NRBC (Absolute) 0.00 K/uL   ESTIMATED GFR   Result Value Ref Range    GFR (CKD-EPI)  81 >60 mL/min/1.73 m 2         COURSE & MEDICAL DECISION MAKING    ED Observation Status? Yes; I am placing the patient in to an observation status due to a diagnostic uncertainty as well as therapeutic intensity. Patient placed in observation status at 0930, 10/2/2023.     Observation plan is as follows: 43 your presenting emerged part with hyperglycemia.  Will work-up for possible DKA or other complicating factors.  IV fluids will be started while lab confirms glycemic status    Upon Reevaluation, the patient's condition has: Improved; and will be discharged.    Patient discharged from ED Observation status at 1300 (Time) 10-2-23 (Date).     INITIAL ASSESSMENT, COURSE AND PLAN  Care Narrative: See care plan as above  DISPOSITION AND DISCUSSIONS  I have discussed management of the patient with the following physicians and MANNY's: None    Discussion of management with other QHP or appropriate source(s): Pharmacy for medication verification      Escalation of care considered, and ultimately not performed:diagnostic imaging and acute inpatient care management, however at this time, the patient is most appropriate for outpatient management    Barriers to care at this time, including but not limited to:  None .     Decision tools and prescription drugs considered including, but not limited to:  Patient to continue with glycemic monitoring and insulin dosing as per sliding scale .    43-year-old male presenting to the emergency room with the above presentation.  No evidence of DKA.  IV fluids and insulin were provided.  Patient is now more euglycemic.  Will discharge home with outpatient monitoring instructions.  He is to follow-up with his PCP for ongoing diabetic care but is also understanding return precautions here to the ER if needed.      FINAL DIAGNOSIS  1. Hyperglycemia           Electronically signed by: Adonis Bustos M.D., 10/2/2023 9:27 AM

## 2023-10-02 NOTE — ED TRIAGE NOTES
Chief Complaint   Patient presents with    High Blood Sugar     Patient reports he was at work when he developed a head ache. Patients blood sugar was 410 per EMS. Patient reports he does not taking insuline to work due to need to be kept cold. Patient reports he ate last at 0530 this morning.

## 2023-10-08 ENCOUNTER — HOSPITAL ENCOUNTER (EMERGENCY)
Facility: MEDICAL CENTER | Age: 43
End: 2023-10-08
Attending: EMERGENCY MEDICINE
Payer: MEDICAID

## 2023-10-08 ENCOUNTER — APPOINTMENT (OUTPATIENT)
Dept: RADIOLOGY | Facility: MEDICAL CENTER | Age: 43
End: 2023-10-08
Attending: EMERGENCY MEDICINE
Payer: MEDICAID

## 2023-10-08 VITALS
RESPIRATION RATE: 16 BRPM | SYSTOLIC BLOOD PRESSURE: 126 MMHG | DIASTOLIC BLOOD PRESSURE: 72 MMHG | TEMPERATURE: 97 F | HEIGHT: 78 IN | HEART RATE: 65 BPM | BODY MASS INDEX: 29.27 KG/M2 | OXYGEN SATURATION: 93 % | WEIGHT: 253 LBS

## 2023-10-08 DIAGNOSIS — L03.011 PARONYCHIA OF FINGER OF RIGHT HAND: ICD-10-CM

## 2023-10-08 DIAGNOSIS — L03.116 CELLULITIS OF LEFT LOWER EXTREMITY: ICD-10-CM

## 2023-10-08 PROCEDURE — 99284 EMERGENCY DEPT VISIT MOD MDM: CPT

## 2023-10-08 PROCEDURE — 700102 HCHG RX REV CODE 250 W/ 637 OVERRIDE(OP): Mod: UD | Performed by: EMERGENCY MEDICINE

## 2023-10-08 PROCEDURE — A9270 NON-COVERED ITEM OR SERVICE: HCPCS | Mod: UD | Performed by: EMERGENCY MEDICINE

## 2023-10-08 PROCEDURE — 73562 X-RAY EXAM OF KNEE 3: CPT | Mod: LT

## 2023-10-08 RX ORDER — CEPHALEXIN 500 MG/1
500 CAPSULE ORAL 4 TIMES DAILY
Qty: 28 CAPSULE | Refills: 0 | Status: ACTIVE | OUTPATIENT
Start: 2023-10-08 | End: 2023-10-27

## 2023-10-08 RX ORDER — CEPHALEXIN 500 MG/1
500 CAPSULE ORAL ONCE
Status: COMPLETED | OUTPATIENT
Start: 2023-10-08 | End: 2023-10-08

## 2023-10-08 RX ORDER — SULFAMETHOXAZOLE AND TRIMETHOPRIM 800; 160 MG/1; MG/1
1 TABLET ORAL ONCE
Status: COMPLETED | OUTPATIENT
Start: 2023-10-08 | End: 2023-10-08

## 2023-10-08 RX ORDER — SULFAMETHOXAZOLE AND TRIMETHOPRIM 800; 160 MG/1; MG/1
1 TABLET ORAL EVERY 12 HOURS
Qty: 14 TABLET | Refills: 0 | Status: ACTIVE | OUTPATIENT
Start: 2023-10-08 | End: 2023-10-15

## 2023-10-08 RX ADMIN — CEPHALEXIN 500 MG: 500 CAPSULE ORAL at 03:40

## 2023-10-08 RX ADMIN — SULFAMETHOXAZOLE AND TRIMETHOPRIM 1 TABLET: 800; 160 TABLET ORAL at 03:40

## 2023-10-08 ASSESSMENT — PAIN DESCRIPTION - PAIN TYPE: TYPE: ACUTE PAIN

## 2023-10-08 ASSESSMENT — FIBROSIS 4 INDEX: FIB4 SCORE: 0.98

## 2023-10-08 NOTE — ED NOTES
Pt understands DC instructions and prescription pickup, DC paperwork provided, Pt wheeled to [facility] for DC

## 2023-10-08 NOTE — ED PROVIDER NOTES
ED Provider Note    CHIEF COMPLAINT  Chief Complaint   Patient presents with    Wound Infection     Pt with wound to left knee, pt. [place], pt. Given tylenol 500 mg and 600 mg ibuprofen by [facility]       EXTERNAL RECORDS REVIEWED  Outpatient Notes      HPI/ROS  LIMITATION TO HISTORY     OUTSIDE HISTORIAN(S):      Norm Prabhakar is a 43 y.o. male who presents to the emergency department left knee pain.  States that [place] causing abrasion to the left knee.  Since then his had worsening pain redness and warmth.  He also reports that he Burtt his index finger in his right hand while making corn beef hash.  Said moderate pain in this area as well.  No fevers no chills no nausea no vomiting he is able to ambulate on knee.    PAST MEDICAL HISTORY   has a past medical history of Anxiety, ASTHMA, Bipolar 1 disorder ([facility]), Depression, Diabetes ([facility]), Fall, Glaucoma, Glaucoma (1982), Hypothyroidism, Indigestion, Mental disorder, Murmur, Pneumonia, Psychiatric problem (2002), S/P thyroidectomy, Seizure ([facility]) (2010), Seizure disorder ([facility]), and Unspecified disorder of thyroid.    SURGICAL HISTORY   has a past surgical history that includes eye surgery; thyroid lobectomy; and other.    FAMILY HISTORY  Family History   Problem Relation Age of Onset    Hypertension Mother     Heart Disease Mother     Lung Disease Mother     Stroke Maternal Grandmother        SOCIAL HISTORY  Social History     Tobacco Use    Smoking status: Former     Current packs/day: 0.00     Types: Cigarettes, Cigars     Quit date: 4/1/2020     Years since quitting: 3.5    Smokeless tobacco: Never   Vaping Use    Vaping Use: Former    Quit date: 9/1/2023    Substances: Nicotine, THC, CBD, Flavoring    Devices: Disposable   Substance and Sexual Activity    Alcohol use: Not Currently     Comment: occasionally    Drug use: Not Currently     Types: Inhaled     Comment: marijuana every few  "days    Sexual activity: Not Currently       CURRENT MEDICATIONS  Home Medications    **Home medications have not yet been reviewed for this encounter**         ALLERGIES  Allergies   Allergen Reactions    Abilify      \"Feeling tired, like I don't even know whats going on around me\"    Fish      Pt reports salmon causes him to be sick to his stomach  Not listed on MAR noted 2/3/2021      Geodon [Ziprasidone Hcl] Anaphylaxis     Anaphylaxis per patient    Aripiprazole      \"I became lethargic.\"    Hydroxyzine Itching     Pt will only state \"I feel itchy when I take it    Ziprasidone        PHYSICAL EXAM  VITAL SIGNS: /75   Pulse 67   Temp 35.9 °C (96.7 °F) (Temporal)   Resp 16   Ht 1.981 m (6' 6\")   Wt 115 kg (253 lb)   SpO2 97%   BMI 29.24 kg/m²      Pulse ox interpretation: I interpret this pulse ox as normal.  Constitutional: Alert and oriented x 3, minimal distress  HEENT: Atraumatic normocephalic, pupils are equal round reactive to light extraocular movements are intact. The nares is clear, external ears are normal, mouth shows moist mucous membranes normal dentition for age  Neck: Supple, no JVD no tracheal deviation  Cardiovascular: Regular rate and rhythm no murmur rub or gallop 2+ pulses peripherally x4  Thorax & Lungs: No respiratory distress, no wheezes rales or rhonchi, No chest tenderness.   GI: Soft nontender nondistended positive bowel sounds, no peritoneal signs  Skin:     Musculoskeletal: Moving all extremities with full range and 5 of 5 strength no acute  deformity  Neurologic: Cranial nerves III through XII are grossly intact no sensory deficit no cerebellar dysfunction   Psychiatric: Appropriate affect for situation at this time        RADIOLOGY  DX-KNEE 3 VIEWS LEFT   Final Result      No evidence of fracture or dislocation.            COURSE & MEDICAL DECISION MAKING    ED Observation Status? No; Patient does not meet criteria for ED Observation.     ASSESSMENT, COURSE AND " PLAN  Care Narrative: 43-year-old male who multiple recent emergency department visits however the today is having skin breakdown over the left knee and also the right distal index finger.  Patient appears to have recently drained paronychia on the right index finger without any fluctuance or active drainage.  Also has surrounding erythema induration and warmth around his left knee concerning for possible cellulitis.  With evidence of acute purulent infection in his right index finger as well as surrounding cellulitis on the left knee patient will be placed on both Bactrim and Keflex.  Patient is given dose of both these medications prior to discharge these are sent to his pharmacy patient had x-ray of the knee which is unremarkable.  Return for worsening pain drainage fever chills nausea vomiting any other acute symptom change or concern otherwise discharged in stable and improved condition.        ADDITIONAL PROBLEM LIST    DISPOSITION AND DISCUSSIONS  I have discussed management of the patient with the following physicians and MANNY's:      Discussion of management with other [facility] or appropriate source(s): Pharmacy    Escalation of care considered, and ultimately not performed:    Barriers to care at this time, including but not limited to: .     Decision tools and prescription drugs considered including, but not limited to: Antibiotics prescribed for both purulent infection as well as nonpurulent cellulitis .    FINAL DIAGNOSIS  1. Cellulitis of left lower extremity Active   2. Paronychia of finger of right hand Active          Electronically signed by: Dick Keith M.D.,

## 2023-10-08 NOTE — ED TRIAGE NOTES
".  Chief Complaint   Patient presents with    Wound Infection     Pt with wound to left knee, pt. [place], pt. Given tylenol 500 mg and 600 mg ibuprofen by [facility]     .Pt is alert and oriented, speaking in full sentences, follows commands and responds appropriately to questions Resp are even and unlabored.  Skin pink warm and dry     Pt placed in lobby. Pt educated on triage process. Pt encouraged to alert staff for any changes.    ./75   Pulse 67   Temp 35.9 °C (96.7 °F) (Temporal)   Resp 16   Ht 1.981 m (6' 6\")   Wt 115 kg (253 lb)   SpO2 97%   BMI 29.24 kg/m²     "

## 2023-10-18 ENCOUNTER — APPOINTMENT (OUTPATIENT)
Dept: RADIOLOGY | Facility: MEDICAL CENTER | Age: 43
End: 2023-10-18
Attending: EMERGENCY MEDICINE
Payer: MEDICAID

## 2023-10-18 ENCOUNTER — HOSPITAL ENCOUNTER (EMERGENCY)
Facility: MEDICAL CENTER | Age: 43
End: 2023-10-18
Attending: EMERGENCY MEDICINE
Payer: MEDICAID

## 2023-10-18 VITALS
DIASTOLIC BLOOD PRESSURE: 57 MMHG | RESPIRATION RATE: 16 BRPM | HEIGHT: 78 IN | OXYGEN SATURATION: 97 % | WEIGHT: 246.91 LBS | SYSTOLIC BLOOD PRESSURE: 107 MMHG | HEART RATE: 51 BPM | TEMPERATURE: 97.1 F | BODY MASS INDEX: 28.57 KG/M2

## 2023-10-18 DIAGNOSIS — K59.00 CONSTIPATION, UNSPECIFIED CONSTIPATION TYPE: ICD-10-CM

## 2023-10-18 PROCEDURE — 74019 RADEX ABDOMEN 2 VIEWS: CPT

## 2023-10-18 PROCEDURE — 99283 EMERGENCY DEPT VISIT LOW MDM: CPT

## 2023-10-18 RX ORDER — DOCUSATE SODIUM 100 MG/1
100 CAPSULE, LIQUID FILLED ORAL 2 TIMES DAILY
Qty: 60 CAPSULE | Refills: 0 | Status: SHIPPED | OUTPATIENT
Start: 2023-10-18 | End: 2023-10-27

## 2023-10-18 RX ORDER — POLYETHYLENE GLYCOL 3350 17 G/17G
17 POWDER, FOR SOLUTION ORAL DAILY
Qty: 225 G | Refills: 0 | Status: SHIPPED | OUTPATIENT
Start: 2023-10-18 | End: 2023-10-27

## 2023-10-18 RX ORDER — POLYETHYLENE GLYCOL 3350, SODIUM SULFATE ANHYDROUS, SODIUM BICARBONATE, SODIUM CHLORIDE, POTASSIUM CHLORIDE 236; 22.74; 6.74; 5.86; 2.97 G/4L; G/4L; G/4L; G/4L; G/4L
240 POWDER, FOR SOLUTION ORAL ONCE
Qty: 240 ML | Refills: 0 | Status: SHIPPED | OUTPATIENT
Start: 2023-10-18 | End: 2023-10-18

## 2023-10-18 ASSESSMENT — FIBROSIS 4 INDEX: FIB4 SCORE: 0.98

## 2023-10-18 ASSESSMENT — LIFESTYLE VARIABLES: DO YOU DRINK ALCOHOL: NO

## 2023-10-18 NOTE — ED TRIAGE NOTES
"Chief Complaint   Patient presents with    Constipation     X4 days. Pt states he has tried \"everything\" w/ no relief. Pt denies abdominal pain       Pt BIB EMS for above. Pt ambulatory into triage. Pt reports drinking coffee to help and states he did try an enema his mother brought him. Pt able to eat and drink. Pt aox4,gcs 15      /75   Pulse 68   Temp 36.2 °C (97.1 °F) (Temporal)   Resp 16   Ht 1.981 m (6' 6\")   Wt 112 kg (246 lb 14.6 oz)   SpO2 98%   BMI 28.53 kg/m²     "

## 2023-10-18 NOTE — ED NOTES
DC home with written and verbal instructions regarding f/u, activity and RX.  Verbalized understanding, ambulated out with steady gait

## 2023-10-18 NOTE — ED PROVIDER NOTES
"  ER Provider Note    Scribed for Torey Mcmanus M.D., by Paradise Salazar. 10/18/2023  11:12 AM    Primary Care Provider: None noted    CHIEF COMPLAINT  Chief Complaint   Patient presents with    Constipation     X4 days. Pt states he has tried \"everything\" w/ no relief. Pt denies abdominal pain     EXTERNAL RECORDS REVIEWED  Outpatient Notes Patient was seen on 10/8/23 for a wound infection    HPI/ROS  LIMITATION TO HISTORY   None  OUTSIDE HISTORIAN(S):  None    Norm Prabhakar is a 43 y.o. male who presents to the ED via EMS complaining of constipation onset 4 days ago. Patient reports drinking coffee, enema and stool softener with no alleviation. Patient reports \"trying everything\". He states he is still passing gas. Patient reports associated pain when attempting to use the restroom. He states he is just getting over a cold and has been taking cough syrup but denies taking any pain medication.     He denies abdominal pain.     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    Hypothyroidism     Indigestion     once in a while    Mental disorder     learning disabilities; speech impairment; developmental delays    Murmur     since birth    Pneumonia     remote    Psychiatric problem 2002    PTSD    S/P thyroidectomy     Seizure ([facility]) 2010    Seizure disorder ([facility])     Unspecified disorder of thyroid      SURGICAL HISTORY  Past Surgical History:   Procedure Laterality Date    EYE SURGERY      OTHER      Hernia Repair when he was 10 yrs old    THYROID LOBECTOMY       FAMILY HISTORY  Family History   Problem Relation Age of Onset    Hypertension Mother     Heart Disease Mother     Lung Disease Mother     Stroke Maternal Grandmother      SOCIAL HISTORY   reports that he quit smoking about 3 " years ago. His smoking use included cigarettes and cigars. He has never used smokeless tobacco. He reports that he does not currently use alcohol. He reports that he does not currently use drugs after having used the following drugs: Inhaled.    CURRENT MEDICATIONS  Previous Medications    ACETAMINOPHEN (TYLENOL) 500 MG TAB    Take 1 Tablet by mouth every 6 hours as needed for Mild Pain or Moderate Pain.    ALBUTEROL 108 (90 BASE) MCG/ACT AERO SOLN INHALATION AEROSOL    Inhale 2 Puffs every 6 hours as needed (cough).    ATORVASTATIN (LIPITOR) 10 MG TAB    Take 1 Tablet by mouth every evening.    BENZONATATE (TESSALON) 100 MG CAP    Take 1 Capsule by mouth 3 times a day as needed for Cough.    CEPHALEXIN (KEFLEX) 500 MG CAP    Take 1 Capsule by mouth 4 times a day.    DICYCLOMINE (BENTYL) 20 MG TAB    Take 1 Tablet by mouth every 6 hours.    DIVALPROEX (DEPAKOTE) 500 MG TABLET DELAYED RESPONSE    Take 500-1,500 mg by mouth 2 times a day. 500 mg in AM and 1500 mg at night    DOCUSATE SODIUM (COLACE) 100 MG CAP    Take 1 Capsule by mouth 2 times a day.    FENOFIBRATE MICRONIZED (LOFIBRA) 67 MG CAPSULE    Take 1 Capsule by mouth every day.    INSULIN GLARGINE (LANTUS SOLOSTAR) 100 UNIT/ML SOLUTION PEN-INJECTOR INJECTION    Inject 34 Units under the skin every morning.    INSULIN PEN NEEDLE 32 G X 4 MM    Use one pen needle in pen device to inject insulin five times daily.    LEVOTHYROXINE (SYNTHROID) 100 MCG TAB    Take 100 mcg by mouth every morning on an empty stomach. Total of 225 mcg once a day    LEVOTHYROXINE (SYNTHROID) 125 MCG TAB    Take 125 mcg by mouth every morning on an empty stomach. Total of 225 mcg once a day    LURASIDONE (LATUDA) 80 MG TAB    Take 80 mg by mouth with dinner.    MOMETASONE-FORMOTEROL (DULERA) 200-5 MCG/ACT AEROSOL    Inhale 2 Puffs 2 times a day.    ONDANSETRON (ZOFRAN ODT) 4 MG TABLET DISPERSIBLE    Take 1 Tablet by mouth every 6 hours as needed for Nausea/Vomiting.    SERTRALINE  "(ZOLOFT) 100 MG TAB    Take 100 mg by mouth every day.    TRAZODONE (DESYREL) 50 MG TAB    Take 50 mg by mouth every evening.     ALLERGIES  Abilify, Fish, Geodon [ziprasidone hcl], Aripiprazole, Hydroxyzine, and Ziprasidone    PHYSICAL EXAM  /75   Pulse 68   Temp 36.2 °C (97.1 °F) (Temporal)   Resp 16   Ht 1.981 m (6' 6\")   Wt 112 kg (246 lb 14.6 oz)   SpO2 98%   BMI 28.53 kg/m²   Constitutional: Well developed, Well nourished, mild distress.   HENT: Normocephalic, Atraumatic.   Eyes: Conjunctiva normal, No discharge.   Cardiovascular: Normal heart rate, Normal rhythm, No murmurs, equal pulses.   Pulmonary: Normal breath sounds, No respiratory distress, No wheezing, No rales, No rhonchi.  Abdomen:Soft, No tenderness, No masses, no rebound, no guarding. Normal bowel sounds.  Back: No CVA tenderness.   Musculoskeletal: No major deformities noted, No tenderness.   Skin: Warm, Dry, No erythema, No rash.   Neurologic: Alert & oriented x 3, Normal motor function,  No focal deficits noted.   Psychiatric: Affect normal, Judgment normal, Mood normal.     DIAGNOSTIC STUDIES  Radiology:   The attending emergency physician has independently interpreted the diagnostic imaging associated with this visit and am waiting the final reading from the radiologist.   Preliminary interpretation is a follows: No obvious obstruction.  Radiologist interpretation:   [id number] [id number]   Final Result      Moderate stool in the descending and rectosigmoid colon, without bowel obstruction.        COURSE & MEDICAL DECISION MAKING   ED Observation Status? No; Patient does not meet criteria for ED Observation.     INITIAL ASSESSMENT, COURSE AND PLAN  Care Narrative:   11:25 AM - Patient was seen and evaluated at bedside. Patient presents to the ED for constipation onset 4 days ago.  On exam patient has normal bowel sounds. After my exam, I discussed with the patient the plan of care, which includes imaging for further evaluation. " Patient understands and verbalizes agreement to plan of care. Ordered DX abdomen to evaluate.     Differential diagnoses include but not limited to: Constipation, small bowel obstruction    12:39 PM - Patient was reevaluated at bedside. Patient feels improved at this time. Discussed radiology results with the patient and informed them he did not have a bowel obstruction. Plan to treat with outpatient medications for alleviation of his symptoms.  I then informed the patient of my plan for discharge, which includes strict return precautions for any new or worsening symptoms. Patient understands and verbalizes agreement to plan of care. Patient is comfortable going home at this time.     PROBLEM LIST  1 constipation at this point time mostly constipation looks to be up in the descending colon which would not not benefit from enema.  I think this will need to be cleared out by oral medications we will give him a short amount of GoLytely and discussed using docusate and MiraLAX daily to help with constipation.  X-ray does not show any signs of a small bowel obstruction.    DISPOSITION AND DISCUSSIONS  I have discussed management of the patient with the following physicians and MANNY's:  None    Discussion of management with other [facility] or appropriate source(s): None     Escalation of care considered, and ultimately not performed: blood analysis.  I think blood work is necessary    Barriers to care at this time, including but not limited to: Patient does not have established PCP.     Decision tools and prescription drugs considered including, but not limited to:   stool softeners and .    Patient will be discharged home.    FOLLOW UP:  Your doctor    Schedule an appointment as soon as possible for a visit       OUTPATIENT MEDICATIONS:  New Prescriptions    DOCUSATE SODIUM (COLACE) 100 MG CAP    Take 1 Capsule by mouth 2 times a day.    POLYETHYLENE GLYCOL 3350 (MIRALAX) 17 GM/SCOOP POWDER    Take 17 g by mouth every day.     POLYETHYLENE GLYCOL-ELECTROLYTES (GOLYTELY) 236 GM RECON SOLN    Take 0.24 L by mouth one time for 1 dose.     FINAL DIAGNOSIS  1. Constipation, unspecified constipation type       The note accurately reflects work and decisions made by me.  Torey Mcmanus M.D.  10/18/2023  2:09 PM

## 2023-10-18 NOTE — DISCHARGE INSTRUCTIONS
Use the GoLytely this should help you have a bowel movement.  Then you want to use the MiraLAX and Colace daily so that you are having soft stools but not diarrhea.  Return the emergency department if you stop passing gas, having a bowel movement in 48 hours, severe abdominal pain or fever.

## 2023-10-21 ENCOUNTER — APPOINTMENT (OUTPATIENT)
Dept: RADIOLOGY | Facility: MEDICAL CENTER | Age: 43
End: 2023-10-21
Attending: EMERGENCY MEDICINE
Payer: MEDICAID

## 2023-10-21 ENCOUNTER — HOSPITAL ENCOUNTER (EMERGENCY)
Facility: MEDICAL CENTER | Age: 43
End: 2023-10-21
Attending: EMERGENCY MEDICINE
Payer: MEDICAID

## 2023-10-21 VITALS
SYSTOLIC BLOOD PRESSURE: 122 MMHG | HEART RATE: 66 BPM | DIASTOLIC BLOOD PRESSURE: 83 MMHG | OXYGEN SATURATION: 97 % | WEIGHT: 238 LBS | RESPIRATION RATE: 14 BRPM | BODY MASS INDEX: 27.54 KG/M2 | HEIGHT: 78 IN | TEMPERATURE: 97.5 F

## 2023-10-21 DIAGNOSIS — L03.032 PARONYCHIA, TOE, LEFT: ICD-10-CM

## 2023-10-21 DIAGNOSIS — M25.562 CHRONIC PAIN OF LEFT KNEE: ICD-10-CM

## 2023-10-21 DIAGNOSIS — G89.29 CHRONIC PAIN OF LEFT KNEE: ICD-10-CM

## 2023-10-21 DIAGNOSIS — L98.9 SKIN LESION OF LEFT LEG: ICD-10-CM

## 2023-10-21 DIAGNOSIS — L03.011 PARONYCHIA OF FINGER OF RIGHT HAND: ICD-10-CM

## 2023-10-21 PROCEDURE — 700102 HCHG RX REV CODE 250 W/ 637 OVERRIDE(OP): Mod: UD | Performed by: EMERGENCY MEDICINE

## 2023-10-21 PROCEDURE — 73630 X-RAY EXAM OF FOOT: CPT | Mod: LT

## 2023-10-21 PROCEDURE — 73564 X-RAY EXAM KNEE 4 OR MORE: CPT | Mod: LT

## 2023-10-21 PROCEDURE — 99284 EMERGENCY DEPT VISIT MOD MDM: CPT

## 2023-10-21 PROCEDURE — A9270 NON-COVERED ITEM OR SERVICE: HCPCS | Mod: UD | Performed by: EMERGENCY MEDICINE

## 2023-10-21 RX ORDER — TRIAMCINOLONE ACETONIDE 1 MG/G
1 OINTMENT TOPICAL 2 TIMES DAILY
Qty: 15 G | Refills: 0 | Status: SHIPPED | OUTPATIENT
Start: 2023-10-21 | End: 2023-10-27

## 2023-10-21 RX ORDER — SULFAMETHOXAZOLE AND TRIMETHOPRIM 800; 160 MG/1; MG/1
1 TABLET ORAL ONCE
Status: COMPLETED | OUTPATIENT
Start: 2023-10-21 | End: 2023-10-21

## 2023-10-21 RX ORDER — SULFAMETHOXAZOLE AND TRIMETHOPRIM 800; 160 MG/1; MG/1
1 TABLET ORAL 2 TIMES DAILY
Qty: 10 TABLET | Refills: 0 | Status: ACTIVE | OUTPATIENT
Start: 2023-10-21 | End: 2023-10-26

## 2023-10-21 RX ADMIN — SULFAMETHOXAZOLE AND TRIMETHOPRIM 1 TABLET: 800; 160 TABLET ORAL at 16:30

## 2023-10-21 ASSESSMENT — FIBROSIS 4 INDEX: FIB4 SCORE: 0.98

## 2023-10-21 NOTE — ED PROVIDER NOTES
ED Provider Note    CHIEF COMPLAINT  Chief Complaint   Patient presents with    Leg Pain     L knee and leg pain starting yesterday. Large scab noted to L knee. Pt states he fell several weeks ago.      EXTERNAL RECORDS REVIEWED  Other seen on 10 8 for wound infection, seen on 1018 for constipation.  Has been seen for prior right-sided first digit infections and left knee abrasions in the past.    HPI/ROS  LIMITATION TO HISTORY   Select: : None  OUTSIDE HISTORIAN(S):  none    Norm Prabhakar is a 43 y.o. male who presents to the emergency room for evaluation of several areas of redness and discharge and left knee pain.  Patient states over the course of several days he has had some developing pain both in his left toe and his right finger.  He has had redness and some discharge sometimes a foul-smelling material along the area where the nail meets the skin.  This is happened before and it resolved on its own.  He has had no fevers or chills.  He also reports his left knee has a large scab in that area and while he had scraped it several weeks ago he says it is larger in nature.  He denies any history of eczema but does have other areas of developing plaques on his flexor surfaces.  He is without tachycardia or febrile illness on initial evaluation.      PAST MEDICAL HISTORY   has a past medical history of Anxiety, ASTHMA, Bipolar 1 disorder ([facility]), Depression, Diabetes ([facility]), Fall, Glaucoma, Glaucoma (1982), Hypothyroidism, Indigestion, Mental disorder, Murmur, Pneumonia, Psychiatric problem (2002), S/P thyroidectomy, Seizure ([facility]) (2010), Seizure disorder ([facility]), and Unspecified disorder of thyroid.    SURGICAL HISTORY   has a past surgical history that includes eye surgery; thyroid lobectomy; and other.    FAMILY HISTORY  Family History   Problem Relation Age of Onset    Hypertension Mother     Heart Disease Mother     Lung Disease Mother     Stroke Maternal Grandmother        SOCIAL HISTORY  Social History  "    Tobacco Use    Smoking status: Former     Current packs/day: 0.00     Types: Cigarettes, Cigars     Quit date: 4/1/2020     Years since quitting: 3.5    Smokeless tobacco: Never   Vaping Use    Vaping Use: Former    Quit date: 9/1/2023    Substances: Nicotine, THC, CBD, Flavoring    Devices: Disposable   Substance and Sexual Activity    Alcohol use: Not Currently     Comment: occasionally    Drug use: Not Currently     Types: Inhaled     Comment: marijuana every few days    Sexual activity: Not Currently     CURRENT MEDICATIONS  Home Medications       Reviewed by Michelle Cotto R.N. (Registered Nurse) on 10/21/23 at 1509  Med List Status: Not Addressed     Medication Last Dose Status   acetaminophen (TYLENOL) 500 MG Tab  Active   albuterol 108 (90 Base) MCG/ACT Aero Soln inhalation aerosol  Active   atorvastatin (LIPITOR) 10 MG Tab  Active   benzonatate (TESSALON) 100 MG Cap  Active   cephALEXin (KEFLEX) 500 MG Cap  Active   dicyclomine (BENTYL) 20 MG Tab  Active   divalproex (DEPAKOTE) 500 MG Tablet Delayed Response  Active   docusate sodium (COLACE) 100 MG Cap  Active   docusate sodium (COLACE) 100 MG Cap  Active   fenofibrate micronized (LOFIBRA) 67 MG capsule  Active   insulin glargine (LANTUS SOLOSTAR) 100 UNIT/ML Solution Pen-injector injection  Active   Insulin Pen Needle 32 G x 4 mm  Active   levothyroxine (SYNTHROID) 100 MCG Tab  Active   levothyroxine (SYNTHROID) 125 MCG Tab  Active   lurasidone (LATUDA) 80 MG Tab  Active   mometasone-formoterol (DULERA) 200-5 MCG/ACT Aerosol  Active   ondansetron (ZOFRAN ODT) 4 MG TABLET DISPERSIBLE  Active   polyethylene glycol 3350 (MIRALAX) 17 GM/SCOOP Powder  Active   sertraline (ZOLOFT) 100 MG Tab  Active   traZODone (DESYREL) 50 MG Tab  Active                    ALLERGIES  Allergies   Allergen Reactions    Abilify      \"Feeling tired, like I don't even know whats going on around me\"    Fish      Pt reports salmon causes him to be sick to his stomach  Not " "listed on MAR noted 2/3/2021      Geodon [Ziprasidone Hcl] Anaphylaxis     Anaphylaxis per patient    Aripiprazole      \"I became lethargic.\"    Hydroxyzine Itching     Pt will only state \"I feel itchy when I take it    Ziprasidone        PHYSICAL EXAM  VITAL SIGNS: /87   Pulse 71   Temp 36.1 °C (96.9 °F) (Temporal)   Resp 14   Ht 1.981 m (6' 6\")   Wt 108 kg (238 lb)   SpO2 97%   BMI 27.50 kg/m²    Genl: M sitting in chair comfortably, speaking clearly, appears in no acute distress   Head: NC/AT   ENT: Mucous membranes moist, posterior pharynx clear, uvula midline, nares patent bilaterally   Pulmonary: Lungs are clear to auscultation bilaterally  CV:  RRR, no murmur appreciated, pulses 2+ in both upper and lower extremities,  Abdomen: soft, NT/ND; no rebound/guarding  Musculoskeletal: Pain free ROM of the neck. Moving upper and lower extremities in spontaneous and coordinated fashion  Right Upper Extremity  - Skin: Does have a paronychia are noted at the right second digit.  With gentle massage this begins draining.  No evidence of felon.  No other abrasions, no lacerations, no ecchymosis  - Motor: Full ROM at shoulder, elbow, wrist; 5/5 wrist flexion/extension, thumb IP joint flexion/extension (AIN/PIN), abduction/adduction (ulnar) intact  - Sensation intact to median/ulnar/radial nerves  - 2+ radial pulse, < 2 sec cap refill x 5 digits  Left Lower Extremity  - Skin: Draining paronychia is noted at the left second toe.  Surrounding redness and irritation.  Pain with palpation of the knee with a small area of healing skin abrasion and overlying scab.  There appears to be developing plaque on the lateral aspects that appear somewhat chronic in nature.  No other abrasions, lacerations or ecchymosis  - Motor: Full ROM at ankle; 5/5 ankle dorsal/plantar flexion, EHL/FHL intact  - Sensation intact to superficial/deep peroneal, tibial, saphenous, sural nerves  - 2+ dorsalis pedis and posterior tibialis, cap " refill < 2 seconds x 5 digits    DIAGNOSTIC STUDIES / PROCEDURES    RADIOLOGY  I have independently interpreted the diagnostic imaging associated with this visit and am waiting the final reading from the radiologist.   My preliminary interpretation is as follows: No evidence of bony injury or fracture, subluxation or dislocation  Radiologist interpretation:   DX-KNEE COMPLETE 4+ LEFT   Final Result      No evidence of fracture or dislocation.      DX-FOOT-COMPLETE 3+ LEFT   Final Result      No evidence of fracture or dislocation.        COURSE & MEDICAL DECISION MAKING    ED Observation Status? No; Patient does not meet criteria for ED Observation.     INITIAL ASSESSMENT, COURSE AND PLAN  Care Narrative: Fantz to the emergency room for symptoms as described above.  The patient has several areas of draining paronychia 1 on the left toe and 1 on the right hand.  These have areas been dealt with infection and burns before.  There is not appear to be any concerns of global digit swelling, no pain with passive movement.  With gentle massage and cleaning at the bedside these wounds are easily expressed and drained and have the ability to be treated with Bactrim as they have been somewhat acute on chronic in nature.  His left knee had some global tenderness and an x-ray was also obtained.  Thankfully both his foot and knee do not show signs of developing reactive bony changes or fracture.  Patient is treated along the areas of plaque with topical steroid and the wound where the healing scab lesions are are dressed with Adaptic dressings.  Patient will need referral to follow-up with primary care and this is placed.  There is no other emergent findings or vital sign abnormalities that would indicate the need for further advanced medical imaging or laboratory work-up at this time.    DISPOSITION AND DISCUSSIONS  I have discussed management of the patient with the following physicians and MANNY's:  none    Discussion of  management with other [facility] or appropriate source(s): None     Barriers to care at this time, including but not limited to: Patient does not have established PCP.     Decision tools and prescription drugs considered including, but not limited to: Antibiotics bactrim .    FINAL DIAGNOSIS  1. Paronychia, toe, left    2. Paronychia of finger of right hand    3. Chronic pain of left knee    4. Skin lesion of left leg      Electronically signed by: Rome Pardo M.D., 10/21/2023 3:38 PM

## 2023-10-21 NOTE — ED NOTES
The pt is alert and oriented. The pt wheeled back to room in wheelchair. The pt transferred to bed with a steady gait.

## 2023-10-21 NOTE — ED TRIAGE NOTES
"Chief Complaint   Patient presents with    Leg Pain     L knee and leg pain starting yesterday. Large scab noted to L knee. Pt states he fell several weeks ago.      Pt would also like index finger on R hand to be looked at because it is red.     /87   Pulse 71   Temp 36.1 °C (96.9 °F) (Temporal)   Resp 14   Ht 1.981 m (6' 6\")   Wt 108 kg (238 lb)   SpO2 97%   BMI 27.50 kg/m²     "

## 2023-10-22 NOTE — ED NOTES
"Pt discharged home by self via taxi. The pt is alert and oriented, calm and cooperative, talks with clear coherent speech, ambulates with a steady gait, and moves extremities to dress self. No indicators of pain. Vitals stable on the monitor. pt in possession of belongings. Pt provided discharge education and information pertaining to medications and follow up appointments. Pt received copy of discharge instructions and verbalized understanding. /83   Pulse 66   Temp 36.4 °C (97.5 °F) (Temporal)   Resp 14   Ht 1.981 m (6' 6\")   Wt 108 kg (238 lb)   SpO2 97%   BMI 27.50 kg/m²     "

## 2023-10-26 ENCOUNTER — TELEPHONE (OUTPATIENT)
Dept: HEALTH INFORMATION MANAGEMENT | Facility: OTHER | Age: 43
End: 2023-10-26

## 2023-10-27 ENCOUNTER — APPOINTMENT (OUTPATIENT)
Dept: RADIOLOGY | Facility: MEDICAL CENTER | Age: 43
End: 2023-10-27
Attending: EMERGENCY MEDICINE
Payer: MEDICAID

## 2023-10-27 ENCOUNTER — HOSPITAL ENCOUNTER (OUTPATIENT)
Facility: MEDICAL CENTER | Age: 43
End: 2023-10-31
Attending: EMERGENCY MEDICINE | Admitting: STUDENT IN AN ORGANIZED HEALTH CARE EDUCATION/TRAINING PROGRAM
Payer: MEDICAID

## 2023-10-27 DIAGNOSIS — R53.1 WEAKNESS: ICD-10-CM

## 2023-10-27 DIAGNOSIS — G81.91 RIGHT HEMIPARESIS (HCC): ICD-10-CM

## 2023-10-27 DIAGNOSIS — G43.409 HEMIPLEGIC MIGRAINE WITHOUT STATUS MIGRAINOSUS, NOT INTRACTABLE: ICD-10-CM

## 2023-10-27 DIAGNOSIS — L97.521 ULCER OF LEFT FOOT, LIMITED TO BREAKDOWN OF SKIN (HCC): ICD-10-CM

## 2023-10-27 LAB
ABO GROUP BLD: NORMAL
ALBUMIN SERPL BCP-MCNC: 4.3 G/DL (ref 3.2–4.9)
ALBUMIN/GLOB SERPL: 1.7 G/DL
ALP SERPL-CCNC: 104 U/L (ref 30–99)
ALT SERPL-CCNC: 21 U/L (ref 2–50)
ANION GAP SERPL CALC-SCNC: 14 MMOL/L (ref 7–16)
APTT PPP: 33.8 SEC (ref 24.7–36)
AST SERPL-CCNC: 24 U/L (ref 12–45)
BASOPHILS # BLD AUTO: 1.5 % (ref 0–1.8)
BASOPHILS # BLD: 0.14 K/UL (ref 0–0.12)
BILIRUB SERPL-MCNC: 0.3 MG/DL (ref 0.1–1.5)
BLD GP AB SCN SERPL QL: NORMAL
BUN SERPL-MCNC: 17 MG/DL (ref 8–22)
CALCIUM ALBUM COR SERPL-MCNC: 9 MG/DL (ref 8.5–10.5)
CALCIUM SERPL-MCNC: 9.2 MG/DL (ref 8.5–10.5)
CHLORIDE SERPL-SCNC: 92 MMOL/L (ref 96–112)
CHOLEST SERPL-MCNC: 356 MG/DL (ref 100–199)
CO2 SERPL-SCNC: 25 MMOL/L (ref 20–33)
CREAT SERPL-MCNC: 1.13 MG/DL (ref 0.5–1.4)
EKG IMPRESSION: NORMAL
EOSINOPHIL # BLD AUTO: 0.11 K/UL (ref 0–0.51)
EOSINOPHIL NFR BLD: 1.2 % (ref 0–6.9)
ERYTHROCYTE [DISTWIDTH] IN BLOOD BY AUTOMATED COUNT: 42.5 FL (ref 35.9–50)
EST. AVERAGE GLUCOSE BLD GHB EST-MCNC: 280 MG/DL
ETHANOL BLD-MCNC: <10.1 MG/DL
GFR SERPLBLD CREATININE-BSD FMLA CKD-EPI: 84 ML/MIN/1.73 M 2
GLOBULIN SER CALC-MCNC: 2.6 G/DL (ref 1.9–3.5)
GLUCOSE BLD STRIP.AUTO-MCNC: 222 MG/DL (ref 65–99)
GLUCOSE BLD STRIP.AUTO-MCNC: 253 MG/DL (ref 65–99)
GLUCOSE SERPL-MCNC: 366 MG/DL (ref 65–99)
HBA1C MFR BLD: 11.4 % (ref 4–5.6)
HCT VFR BLD AUTO: 36.1 % (ref 42–52)
HDLC SERPL-MCNC: 31 MG/DL
HGB BLD-MCNC: 12.5 G/DL (ref 14–18)
IMM GRANULOCYTES # BLD AUTO: 0.07 K/UL (ref 0–0.11)
IMM GRANULOCYTES NFR BLD AUTO: 0.8 % (ref 0–0.9)
INR PPP: 1.09 (ref 0.87–1.13)
LDLC SERPL CALC-MCNC: ABNORMAL MG/DL
LYMPHOCYTES # BLD AUTO: 2.76 K/UL (ref 1–4.8)
LYMPHOCYTES NFR BLD: 30.4 % (ref 22–41)
MCH RBC QN AUTO: 31.8 PG (ref 27–33)
MCHC RBC AUTO-ENTMCNC: 34.6 G/DL (ref 32.3–36.5)
MCV RBC AUTO: 91.9 FL (ref 81.4–97.8)
MONOCYTES # BLD AUTO: 0.63 K/UL (ref 0–0.85)
MONOCYTES NFR BLD AUTO: 6.9 % (ref 0–13.4)
NEUTROPHILS # BLD AUTO: 5.37 K/UL (ref 1.82–7.42)
NEUTROPHILS NFR BLD: 59.2 % (ref 44–72)
NRBC # BLD AUTO: 0 K/UL
NRBC BLD-RTO: 0 /100 WBC (ref 0–0.2)
PLATELET # BLD AUTO: 324 K/UL (ref 164–446)
PMV BLD AUTO: 10.3 FL (ref 9–12.9)
POTASSIUM SERPL-SCNC: 4.5 MMOL/L (ref 3.6–5.5)
PROT SERPL-MCNC: 6.9 G/DL (ref 6–8.2)
PROTHROMBIN TIME: 14.2 SEC (ref 12–14.6)
RBC # BLD AUTO: 3.93 M/UL (ref 4.7–6.1)
RH BLD: NORMAL
SODIUM SERPL-SCNC: 131 MMOL/L (ref 135–145)
T4 FREE SERPL-MCNC: <0.11 NG/DL (ref 0.93–1.7)
TRIGL SERPL-MCNC: 1123 MG/DL (ref 0–149)
TROPONIN T SERPL-MCNC: 40 NG/L (ref 6–19)
TSH SERPL DL<=0.005 MIU/L-ACNC: 122 UIU/ML (ref 0.38–5.33)
WBC # BLD AUTO: 9.1 K/UL (ref 4.8–10.8)

## 2023-10-27 PROCEDURE — 93005 ELECTROCARDIOGRAM TRACING: CPT | Performed by: EMERGENCY MEDICINE

## 2023-10-27 PROCEDURE — 85610 PROTHROMBIN TIME: CPT

## 2023-10-27 PROCEDURE — 71045 X-RAY EXAM CHEST 1 VIEW: CPT

## 2023-10-27 PROCEDURE — 85730 THROMBOPLASTIN TIME PARTIAL: CPT

## 2023-10-27 PROCEDURE — 70450 CT HEAD/BRAIN W/O DYE: CPT

## 2023-10-27 PROCEDURE — 99222 1ST HOSP IP/OBS MODERATE 55: CPT | Performed by: STUDENT IN AN ORGANIZED HEALTH CARE EDUCATION/TRAINING PROGRAM

## 2023-10-27 PROCEDURE — 83036 HEMOGLOBIN GLYCOSYLATED A1C: CPT

## 2023-10-27 PROCEDURE — 86850 RBC ANTIBODY SCREEN: CPT

## 2023-10-27 PROCEDURE — 0042T CT-CEREBRAL PERFUSION ANALYSIS: CPT

## 2023-10-27 PROCEDURE — 84439 ASSAY OF FREE THYROXINE: CPT

## 2023-10-27 PROCEDURE — G0378 HOSPITAL OBSERVATION PER HR: HCPCS

## 2023-10-27 PROCEDURE — 96365 THER/PROPH/DIAG IV INF INIT: CPT | Mod: XU

## 2023-10-27 PROCEDURE — 700102 HCHG RX REV CODE 250 W/ 637 OVERRIDE(OP): Mod: UD | Performed by: STUDENT IN AN ORGANIZED HEALTH CARE EDUCATION/TRAINING PROGRAM

## 2023-10-27 PROCEDURE — 82962 GLUCOSE BLOOD TEST: CPT

## 2023-10-27 PROCEDURE — 96375 TX/PRO/DX INJ NEW DRUG ADDON: CPT | Mod: XU

## 2023-10-27 PROCEDURE — 84484 ASSAY OF TROPONIN QUANT: CPT

## 2023-10-27 PROCEDURE — 700111 HCHG RX REV CODE 636 W/ 250 OVERRIDE (IP): Mod: JZ,UD | Performed by: EMERGENCY MEDICINE

## 2023-10-27 PROCEDURE — 84443 ASSAY THYROID STIM HORMONE: CPT

## 2023-10-27 PROCEDURE — 99285 EMERGENCY DEPT VISIT HI MDM: CPT

## 2023-10-27 PROCEDURE — 80053 COMPREHEN METABOLIC PANEL: CPT

## 2023-10-27 PROCEDURE — 96372 THER/PROPH/DIAG INJ SC/IM: CPT | Mod: XU

## 2023-10-27 PROCEDURE — 700111 HCHG RX REV CODE 636 W/ 250 OVERRIDE (IP): Mod: JZ,UD | Performed by: NURSE PRACTITIONER

## 2023-10-27 PROCEDURE — 85025 COMPLETE CBC W/AUTO DIFF WBC: CPT

## 2023-10-27 PROCEDURE — 86900 BLOOD TYPING SEROLOGIC ABO: CPT

## 2023-10-27 PROCEDURE — 96366 THER/PROPH/DIAG IV INF ADDON: CPT

## 2023-10-27 PROCEDURE — 80061 LIPID PANEL: CPT

## 2023-10-27 PROCEDURE — 94760 N-INVAS EAR/PLS OXIMETRY 1: CPT

## 2023-10-27 PROCEDURE — 82077 ASSAY SPEC XCP UR&BREATH IA: CPT

## 2023-10-27 PROCEDURE — 86901 BLOOD TYPING SEROLOGIC RH(D): CPT

## 2023-10-27 PROCEDURE — 70496 CT ANGIOGRAPHY HEAD: CPT

## 2023-10-27 PROCEDURE — A9270 NON-COVERED ITEM OR SERVICE: HCPCS | Mod: UD | Performed by: STUDENT IN AN ORGANIZED HEALTH CARE EDUCATION/TRAINING PROGRAM

## 2023-10-27 PROCEDURE — 36415 COLL VENOUS BLD VENIPUNCTURE: CPT

## 2023-10-27 PROCEDURE — 700117 HCHG RX CONTRAST REV CODE 255: Mod: UD | Performed by: EMERGENCY MEDICINE

## 2023-10-27 PROCEDURE — 70498 CT ANGIOGRAPHY NECK: CPT

## 2023-10-27 RX ORDER — DIPHENHYDRAMINE HYDROCHLORIDE 50 MG/ML
25 INJECTION INTRAMUSCULAR; INTRAVENOUS ONCE
Status: COMPLETED | OUTPATIENT
Start: 2023-10-27 | End: 2023-10-27

## 2023-10-27 RX ORDER — MORPHINE SULFATE 4 MG/ML
4 INJECTION INTRAVENOUS ONCE
Status: COMPLETED | OUTPATIENT
Start: 2023-10-27 | End: 2023-10-27

## 2023-10-27 RX ORDER — METOCLOPRAMIDE HYDROCHLORIDE 5 MG/ML
10 INJECTION INTRAMUSCULAR; INTRAVENOUS ONCE
Status: COMPLETED | OUTPATIENT
Start: 2023-10-27 | End: 2023-10-27

## 2023-10-27 RX ORDER — SERTRALINE HYDROCHLORIDE 100 MG/1
200 TABLET, FILM COATED ORAL DAILY
Status: DISCONTINUED | OUTPATIENT
Start: 2023-10-27 | End: 2023-10-31 | Stop reason: HOSPADM

## 2023-10-27 RX ORDER — LORATADINE 10 MG/1
10 TABLET ORAL DAILY
Status: ON HOLD | COMMUNITY
End: 2024-02-09

## 2023-10-27 RX ORDER — LURASIDONE HYDROCHLORIDE 40 MG/1
80 TABLET, FILM COATED ORAL
Status: DISCONTINUED | OUTPATIENT
Start: 2023-10-27 | End: 2023-10-31 | Stop reason: HOSPADM

## 2023-10-27 RX ORDER — INSULIN GLARGINE 100 [IU]/ML
32 INJECTION, SOLUTION SUBCUTANEOUS 2 TIMES DAILY
Status: ON HOLD | COMMUNITY
End: 2024-01-09

## 2023-10-27 RX ORDER — INSULIN LISPRO 100 [IU]/ML
1-6 INJECTION, SOLUTION INTRAVENOUS; SUBCUTANEOUS
Status: DISCONTINUED | OUTPATIENT
Start: 2023-10-27 | End: 2023-10-28

## 2023-10-27 RX ORDER — LEVOTHYROXINE SODIUM 0.2 MG/1
200 TABLET ORAL
Status: DISCONTINUED | OUTPATIENT
Start: 2023-10-28 | End: 2023-10-28

## 2023-10-27 RX ORDER — ATORVASTATIN CALCIUM 10 MG/1
10 TABLET, FILM COATED ORAL EVERY EVENING
Status: DISCONTINUED | OUTPATIENT
Start: 2023-10-27 | End: 2023-10-31 | Stop reason: HOSPADM

## 2023-10-27 RX ORDER — MAGNESIUM SULFATE HEPTAHYDRATE 40 MG/ML
2 INJECTION, SOLUTION INTRAVENOUS ONCE
Status: COMPLETED | OUTPATIENT
Start: 2023-10-27 | End: 2023-10-27

## 2023-10-27 RX ORDER — DIVALPROEX SODIUM 500 MG/1
1500 TABLET, DELAYED RELEASE ORAL EVERY EVENING
Status: DISCONTINUED | OUTPATIENT
Start: 2023-10-27 | End: 2023-10-31 | Stop reason: HOSPADM

## 2023-10-27 RX ORDER — LEVOTHYROXINE SODIUM 0.03 MG/1
25 TABLET ORAL
Status: DISCONTINUED | OUTPATIENT
Start: 2023-10-28 | End: 2023-10-28

## 2023-10-27 RX ORDER — DIVALPROEX SODIUM 500 MG/1
500-1500 TABLET, DELAYED RELEASE ORAL 2 TIMES DAILY
Status: DISCONTINUED | OUTPATIENT
Start: 2023-10-27 | End: 2023-10-27

## 2023-10-27 RX ORDER — KETOROLAC TROMETHAMINE 30 MG/ML
30 INJECTION, SOLUTION INTRAMUSCULAR; INTRAVENOUS ONCE
Status: COMPLETED | OUTPATIENT
Start: 2023-10-27 | End: 2023-10-27

## 2023-10-27 RX ORDER — ONDANSETRON 2 MG/ML
4 INJECTION INTRAMUSCULAR; INTRAVENOUS ONCE
Status: COMPLETED | OUTPATIENT
Start: 2023-10-27 | End: 2023-10-27

## 2023-10-27 RX ORDER — DIVALPROEX SODIUM 500 MG/1
500 TABLET, DELAYED RELEASE ORAL EVERY MORNING
Status: DISCONTINUED | OUTPATIENT
Start: 2023-10-28 | End: 2023-10-31 | Stop reason: HOSPADM

## 2023-10-27 RX ORDER — CHOLECALCIFEROL (VITAMIN D3) 50 MCG
4000 TABLET ORAL DAILY
Status: ON HOLD | COMMUNITY
End: 2024-01-09

## 2023-10-27 RX ADMIN — IOHEXOL 40 ML: 350 INJECTION, SOLUTION INTRAVENOUS at 14:15

## 2023-10-27 RX ADMIN — IOHEXOL 80 ML: 350 INJECTION, SOLUTION INTRAVENOUS at 14:16

## 2023-10-27 RX ADMIN — INSULIN GLARGINE-YFGN 15 UNITS: 100 INJECTION, SOLUTION SUBCUTANEOUS at 17:51

## 2023-10-27 RX ADMIN — METOCLOPRAMIDE 10 MG: 5 INJECTION, SOLUTION INTRAMUSCULAR; INTRAVENOUS at 16:04

## 2023-10-27 RX ADMIN — LURASIDONE HYDROCHLORIDE 80 MG: 40 TABLET, FILM COATED ORAL at 19:35

## 2023-10-27 RX ADMIN — ATORVASTATIN CALCIUM 10 MG: 10 TABLET, FILM COATED ORAL at 17:50

## 2023-10-27 RX ADMIN — SERTRALINE 200 MG: 100 TABLET, FILM COATED ORAL at 17:50

## 2023-10-27 RX ADMIN — KETOROLAC TROMETHAMINE 30 MG: 30 INJECTION, SOLUTION INTRAMUSCULAR; INTRAVENOUS at 16:04

## 2023-10-27 RX ADMIN — INSULIN LISPRO 2 UNITS: 100 INJECTION, SOLUTION INTRAVENOUS; SUBCUTANEOUS at 22:38

## 2023-10-27 RX ADMIN — ONDANSETRON 4 MG: 2 INJECTION INTRAMUSCULAR; INTRAVENOUS at 15:39

## 2023-10-27 RX ADMIN — MAGNESIUM SULFATE HEPTAHYDRATE 2 G: 2 INJECTION, SOLUTION INTRAVENOUS at 16:07

## 2023-10-27 RX ADMIN — DIPHENHYDRAMINE HYDROCHLORIDE 25 MG: 50 INJECTION, SOLUTION INTRAMUSCULAR; INTRAVENOUS at 16:04

## 2023-10-27 RX ADMIN — MORPHINE SULFATE 4 MG: 4 INJECTION, SOLUTION INTRAMUSCULAR; INTRAVENOUS at 15:40

## 2023-10-27 RX ADMIN — DIVALPROEX SODIUM 1500 MG: 500 TABLET, DELAYED RELEASE ORAL at 17:50

## 2023-10-27 ASSESSMENT — PATIENT HEALTH QUESTIONNAIRE - PHQ9
8. MOVING OR SPEAKING SO SLOWLY THAT OTHER PEOPLE COULD HAVE NOTICED. OR THE OPPOSITE, BEING SO FIGETY OR RESTLESS THAT YOU HAVE BEEN MOVING AROUND A LOT MORE THAN USUAL: SEVERAL DAYS
2. FEELING DOWN, DEPRESSED, IRRITABLE, OR HOPELESS: NEARLY EVERY DAY
1. LITTLE INTEREST OR PLEASURE IN DOING THINGS: NEARLY EVERY DAY
3. TROUBLE FALLING OR STAYING ASLEEP OR SLEEPING TOO MUCH: SEVERAL DAYS
SUM OF ALL RESPONSES TO PHQ QUESTIONS 1-9: 11
6. FEELING BAD ABOUT YOURSELF - OR THAT YOU ARE A FAILURE OR HAVE LET YOURSELF OR YOUR FAMILY DOWN: SEVERAL DAYS
7. TROUBLE CONCENTRATING ON THINGS, SUCH AS READING THE NEWSPAPER OR WATCHING TELEVISION: SEVERAL DAYS
SUM OF ALL RESPONSES TO PHQ9 QUESTIONS 1 AND 2: 6
9. THOUGHTS THAT YOU WOULD BE BETTER OFF DEAD, OR OF HURTING YOURSELF: NOT AT ALL
4. FEELING TIRED OR HAVING LITTLE ENERGY: SEVERAL DAYS
5. POOR APPETITE OR OVEREATING: NOT AT ALL

## 2023-10-27 ASSESSMENT — LIFESTYLE VARIABLES
ON A TYPICAL DAY WHEN YOU DRINK ALCOHOL HOW MANY DRINKS DO YOU HAVE: 0
TOTAL SCORE: 0
HAVE YOU EVER FELT YOU SHOULD CUT DOWN ON YOUR DRINKING: NO
TOTAL SCORE: 0
CONSUMPTION TOTAL: NEGATIVE
ALCOHOL_USE: NO
EVER FELT BAD OR GUILTY ABOUT YOUR DRINKING: NO
HAVE PEOPLE ANNOYED YOU BY CRITICIZING YOUR DRINKING: NO
TOTAL SCORE: 0
EVER HAD A DRINK FIRST THING IN THE MORNING TO STEADY YOUR NERVES TO GET RID OF A HANGOVER: NO
AVERAGE NUMBER OF DAYS PER WEEK YOU HAVE A DRINK CONTAINING ALCOHOL: 0
HOW MANY TIMES IN THE PAST YEAR HAVE YOU HAD 5 OR MORE DRINKS IN A DAY: 0

## 2023-10-27 ASSESSMENT — ENCOUNTER SYMPTOMS
COUGH: 0
SHORTNESS OF BREATH: 0
FEVER: 0
PALPITATIONS: 0
FOCAL WEAKNESS: 1
HEADACHES: 1
NAUSEA: 0
SORE THROAT: 0
CHILLS: 0

## 2023-10-27 ASSESSMENT — FIBROSIS 4 INDEX
FIB4 SCORE: 0.66
FIB4 SCORE: 0.98
FIB4 SCORE: 0.66

## 2023-10-27 NOTE — ED TRIAGE NOTES
"..  Chief Complaint   Patient presents with    Possible Stroke     Patient was walking himself to [facility] for his ongoing depression, and he reports he had a sudden onset of RUE/RLE numbness and tingling at 1130am. Denies blood thinners. NIHSS 10 on arrival to hospital. Stroke Alert called by ERP.        44 yo male brought in by EMS for above complaint. NIH 10 per ERP. Patient is unable to move his right arm/leg on arrival, endorses numbness/tingling to right side, and mild aphasia.     Patient was given no medications en route. Blood glucose 428.     Stroke alert called by ERP. Patient to CT before being roomed. Neuro NP met patient at CT.     Patient to [facility] 9 with belongings. Reported off to Jodie ANNE.     /70   Pulse (!) 120   Resp 15   Ht 1.981 m (6' 6\")   Wt 112 kg (248 lb)   SpO2 97%   BMI 28.66 kg/m²     "

## 2023-10-27 NOTE — PROGRESS NOTES
Neurology Note-- Stroke Alert    43-year old male, well known to [facility], with numerous (>20) hospital presentations over the past 6 months for a multitude of complaints; also with history of hemiplegic migraine (seen by outpatient neurology at [facility]), seizures (versus PNES), depression, and poorly controlled diabetes mellitus who again presented to [facility] on 10/27/23 for a chief complaint of Right sided weakness and headache; patient reports he was feeling depressed today, was going to check himself in at [facility] however soon after developed RUE/RLE weakness thus he walked to [facility]. On arrival here stroke alert was called. /70,  (note recent A1c 11.2). Stat CT head revealed no acute intracranial abnormality; CTA head/neck with no LVO, CT perfusion normal study. Patient determined not to be a candidate for IV thrombolytics secondary to mild/non disabling symptoms-- they appear to be distractible, with forced RUE downward drift and good/normal tone; he also notably has stuttered speech which is largely functional/psychogenic-- suspected functional etiology, or migrainous etiology.   As was discussed with the patient, will give one time doses of Reglan 10 mg IV, Benadryl 25 mg IV, Toradol 30 mg IV and Mg 2g IV and reassess for improvement. If symptoms improve, patient may be discharged with plan to follow up with outpatient neurology.   If symptoms do not resolve, plan to admit patient for MRI Brain and additional work up (note, patient does have poorly controlled diabetes which does put the patient at higher risk for stroke; please educate).     Please re-consult neurology if symptoms do not resolve/if MRI Brain is revealing. The above has been discussed with Dr. Ayala and with ED Dr. Flanagan.     DANY Saenz.

## 2023-10-27 NOTE — PROGRESS NOTES
Paged for admission. Visited and examined patient. Neurology will try migraine cocktail, if improved then can be discharged. If no improvement then will admit for observation and MRI.  Will await update from Dr. Flanagan on dispo.

## 2023-10-27 NOTE — ED PROVIDER NOTES
ED Provider Note    CHIEF COMPLAINT  Chief Complaint   Patient presents with    Possible Stroke     Patient was walking himself to [facility] for his ongoing depression, and he reports he had a sudden onset of RUE/RLE numbness and tingling at 1130am. Denies blood thinners. NIHSS 10 on arrival to hospital. Stroke Alert called by ERP.        EXTERNAL RECORDS REVIEWED  Inpatient Notes patient was admitted to the hospital 8/13/2023 to 8/17/2023 4 days for cough and congestion and flulike symptoms he had elevated beta hydroxybutyric acid at that time but normal bicarb.  He was treated with insulin and IV fluids and albuterol inhaler.  Antibiotics were never started as his procalcitonin was normal.    HPI/ROS  LIMITATION TO HISTORY   Select: Behavior  OUTSIDE HISTORIAN(S):  EMS EMS reports that the patient was walking to the emergency department anyway because he was feeling depressed.  Around 1130 as he was walking here he developed tingling and numbness in his right arm and leg.  The emesis called and he was brought to the ER under their care.  He states that he has had a history of a previous stroke.  He denies any recent falls or trauma.    Norm Prabhakar is a 43 y.o. male who presents by ambulance after starting to have tingling and numbness in his right arm and his right leg.  He was picked up by the ambulance while he was walking to the ER for evaluation for depression.  He denies any falls or trauma.  He is of a slight headache.  He denies any vision changes nausea or vomiting.  He states he has had a stroke in the past.  He admits to smoking and alcohol use.  He denies any fevers cough or cold symptoms chest pains or shortness of breath.  Upon review of her charts this is the patient's fifth visit to the ER this month.    PAST MEDICAL HISTORY   has a past medical history of Anxiety, ASTHMA, Bipolar 1 disorder (HCC), Depression, Diabetes (HCC), Fall, Glaucoma, Glaucoma (1982), Hypothyroidism, Indigestion, Mental  disorder, Murmur, Pneumonia, Psychiatric problem (2002), S/P thyroidectomy, Seizure (HCC) (2010), Seizure disorder (HCC), and Unspecified disorder of thyroid.    SURGICAL HISTORY   has a past surgical history that includes eye surgery; thyroid lobectomy; and other.    FAMILY HISTORY  Family History   Problem Relation Age of Onset    Hypertension Mother     Heart Disease Mother     Lung Disease Mother     Stroke Maternal Grandmother        SOCIAL HISTORY  Social History     Tobacco Use    Smoking status: Former     Current packs/day: 0.00     Types: Cigarettes, Cigars     Quit date: 4/1/2020     Years since quitting: 3.5    Smokeless tobacco: Never   Vaping Use    Vaping Use: Former    Quit date: 9/1/2023    Substances: Nicotine, THC, CBD, Flavoring    Devices: Disposable   Substance and Sexual Activity    Alcohol use: Not Currently     Comment: occasionally    Drug use: Not Currently     Types: Inhaled     Comment: marijuana every few days    Sexual activity: Not Currently       CURRENT MEDICATIONS  Home Medications       Reviewed by Benjamin Rai (Pharmacy Tech) on 10/27/23 at 1545  Med List Status: Unable to Obtain     Medication Last Dose Status   acetaminophen (TYLENOL) 500 MG Tab  Active   albuterol 108 (90 Base) MCG/ACT Aero Soln inhalation aerosol  Active   atorvastatin (LIPITOR) 10 MG Tab  Active   benzonatate (TESSALON) 100 MG Cap  Active   cephALEXin (KEFLEX) 500 MG Cap  Active   dicyclomine (BENTYL) 20 MG Tab  Active   divalproex (DEPAKOTE) 500 MG Tablet Delayed Response  Active   docusate sodium (COLACE) 100 MG Cap  Active   docusate sodium (COLACE) 100 MG Cap  Active   fenofibrate micronized (LOFIBRA) 67 MG capsule  Active   insulin glargine (LANTUS SOLOSTAR) 100 UNIT/ML Solution Pen-injector injection  Active   Insulin Pen Needle 32 G x 4 mm  Active   levothyroxine (SYNTHROID) 100 MCG Tab  Active   levothyroxine (SYNTHROID) 125 MCG Tab  Active   lurasidone (LATUDA) 80 MG Tab  Active  "  mometasone-formoterol (DULERA) 200-5 MCG/ACT Aerosol  Active   ondansetron (ZOFRAN ODT) 4 MG TABLET DISPERSIBLE  Active   polyethylene glycol 3350 (MIRALAX) 17 GM/SCOOP Powder  Active   sertraline (ZOLOFT) 100 MG Tab  Active   traZODone (DESYREL) 50 MG Tab  Active   triamcinolone acetonide (KENALOG) 0.1 % Ointment  Active                    ALLERGIES  Allergies   Allergen Reactions    Abilify      \"Feeling tired, like I don't even know whats going on around me\"    Fish      Pt reports salmon causes him to be sick to his stomach  Not listed on MAR noted 2/3/2021      Geodon [Ziprasidone Hcl] Anaphylaxis     Anaphylaxis per patient    Aripiprazole      \"I became lethargic.\"    Hydroxyzine Itching     Pt will only state \"I feel itchy when I take it    Ziprasidone        PHYSICAL EXAM  VITAL SIGNS: /70   Pulse (!) 120   Temp 37.2 °C (98.9 °F) (Temporal)   Resp 15   Ht 1.981 m (6' 6\")   Wt 112 kg (248 lb)   SpO2 97%   BMI 28.66 kg/m²      Constitutional: Well developed, Well nourished, No acute distress, Non-toxic appearance.   HEENT: Normocephalic, Atraumatic,  external ears normal, pharynx pink,  Mucous  Membranes moist, No rhinorrhea or mucosal edema  Eyes: PERRL, EOMI, Conjunctiva normal, No discharge.   Neck: Normal range of motion, No tenderness, Supple, No stridor.   Lymphatic: No lymphadenopathy    Cardiovascular: Tachycardic Regular Rhythm, No murmurs,  rubs, or gallops.   Thorax & Lungs: Lungs clear to auscultation bilaterally, No respiratory distress, No wheezes, rhales or rhonchi, No chest wall tenderness.   Abdomen: Bowel sounds normal, Soft, non tender, non distended,  No pulsatile masses., no rebound guarding or peritoneal signs.   Skin: Warm, Dry, No erythema, No rash,   Back:  No CVA tenderness,  No spinal tenderness, bony crepitance step offs or instability.   Extremities: Equal, intact distal pulses, No cyanosis, clubbing or edema,  No tenderness.   Musculoskeletal: Good range of motion " in all major joints. No tenderness to palpation or major deformities noted.   Neurologic: Alert & oriented describes numbness and tingling in his right arm and his right leg he refuses to move his right arm or leg for me at all.  He however does have good muscle tone.  Psychiatric: Affect flat, Judgment normal, Mood normal.      DIAGNOSTIC STUDIES / PROCEDURES  EKG  I have independently interpreted this EKG  See below    LABS  Results for orders placed or performed during the hospital encounter of 10/27/23   CBC WITH DIFFERENTIAL   Result Value Ref Range    WBC 9.1 4.8 - 10.8 K/uL    RBC 3.93 (L) 4.70 - 6.10 M/uL    Hemoglobin 12.5 (L) 14.0 - 18.0 g/dL    Hematocrit 36.1 (L) 42.0 - 52.0 %    MCV 91.9 81.4 - 97.8 fL    MCH 31.8 27.0 - 33.0 pg    MCHC 34.6 32.3 - 36.5 g/dL    RDW 42.5 35.9 - 50.0 fL    Platelet Count 324 164 - 446 K/uL    MPV 10.3 9.0 - 12.9 fL    Neutrophils-Polys 59.20 44.00 - 72.00 %    Lymphocytes 30.40 22.00 - 41.00 %    Monocytes 6.90 0.00 - 13.40 %    Eosinophils 1.20 0.00 - 6.90 %    Basophils 1.50 0.00 - 1.80 %    Immature Granulocytes 0.80 0.00 - 0.90 %    Nucleated RBC 0.00 0.00 - 0.20 /100 WBC    Neutrophils (Absolute) 5.37 1.82 - 7.42 K/uL    Lymphs (Absolute) 2.76 1.00 - 4.80 K/uL    Monos (Absolute) 0.63 0.00 - 0.85 K/uL    Eos (Absolute) 0.11 0.00 - 0.51 K/uL    Baso (Absolute) 0.14 (H) 0.00 - 0.12 K/uL    Immature Granulocytes (abs) 0.07 0.00 - 0.11 K/uL    NRBC (Absolute) 0.00 K/uL   COMP METABOLIC PANEL   Result Value Ref Range    Sodium 131 (L) 135 - 145 mmol/L    Potassium 4.5 3.6 - 5.5 mmol/L    Chloride 92 (L) 96 - 112 mmol/L    Co2 25 20 - 33 mmol/L    Anion Gap 14.0 7.0 - 16.0    Glucose 366 (H) 65 - 99 mg/dL    Bun 17 8 - 22 mg/dL    Creatinine 1.13 0.50 - 1.40 mg/dL    Calcium 9.2 8.5 - 10.5 mg/dL    Correct Calcium 9.0 8.5 - 10.5 mg/dL    AST(SGOT) 24 12 - 45 U/L    ALT(SGPT) 21 2 - 50 U/L    Alkaline Phosphatase 104 (H) 30 - 99 U/L    Total Bilirubin 0.3 0.1 - 1.5 mg/dL     Albumin 4.3 3.2 - 4.9 g/dL    Total Protein 6.9 6.0 - 8.2 g/dL    Globulin 2.6 1.9 - 3.5 g/dL    A-G Ratio 1.7 g/dL   PROTHROMBIN TIME   Result Value Ref Range    PT 14.2 12.0 - 14.6 sec    INR 1.09 0.87 - 1.13   APTT   Result Value Ref Range    APTT 33.8 24.7 - 36.0 sec   COD (ADULT)   Result Value Ref Range    ABO Grouping Only A     Rh Grouping Only POS     Antibody Screen-Cod NEG    TROPONIN   Result Value Ref Range    Troponin T 40 (H) 6 - 19 ng/L   DIAGNOSTIC ALCOHOL   Result Value Ref Range    Diagnostic Alcohol <10.1 <10.1 mg/dL   ESTIMATED GFR   Result Value Ref Range    GFR (CKD-EPI) 84 >60 mL/min/1.73 m 2   EKG (NOW)   Result Value Ref Range    Report       [facility] Emergency Dept.    Test Date:  2023-10-27  Pt Name:    HOLLY KIM                 Department: ER  MRN:        [id number]                      Room:        09  Gender:     Male                         Technician: [id number]  :        1980                   Requested By:ER TRIAGE PROTOCOL  Order #:    [id number]                    Reading MD: STEPHANIE NI MD    Measurements  Intervals                                Axis  Rate:       75                           P:          47  IL:         181                          QRS:        51  QRSD:       119                          T:          55  QT:         402  QTc:        449    Interpretive Statements  Sinus rhythm  Nonspecific intraventricular conduction delay  Low voltage, extremity leads  Compared to ECG 2023 19:17:55  Low QRS voltage now present  Electronically Signed On 10- 14:36:17 PDT by STEPHANIE NI MD           RADIOLOGY  I have independently interpreted the diagnostic imaging associated with this visit and am waiting the final reading from the radiologist.   My preliminary interpretation is as follows: ct head w/o no intracranial hemorrhage or mass effect  Radiologist interpretation:   DX-CHEST-PORTABLE (1 VIEW)   Final Result      No acute  cardiopulmonary abnormality.         CT-CTA NECK WITH & W/O-POST PROCESSING   Final Result      1.  No evidence of carotid artery stenosis or occlusion.   2.  Vertebral basilar system intact.   3.  Heterogeneously enhancing mass in the anterior midline of the neck again seen similar to prior studies.      CT-CTA HEAD WITH & W/O-POST PROCESS   Final Result      1.  There is no large vessel occlusion.   2.  Diffuse low-attenuation of the bilateral white matter.      CT-CEREBRAL PERFUSION ANALYSIS   Final Result      1.  Cerebral blood flow less than 30% likely representing completed infarct = 0 mL.      2.  T Max more than 6 seconds likely representing combination of completed infarct and ischemia = 0 mL.      3.  Mismatched volume likely representing ischemic brain/penumbra = None      4.  Please note that the cerebral perfusion was performed on the limited brain tissue around the basal ganglia region. Infarct/ischemia outside the CT perfusion sections can be missed in this study.      CT-HEAD W/O   Final Result      1.  No acute intracranial hemorrhage or evidence of territorial infarct.   2.  Diffuse bilateral hypodensity throughout the white matter unchanged compared with multiple prior studies including MRI and numerous prior CTs.              COURSE & MEDICAL DECISION MAKING    ED Observation Status? No; Patient does not meet criteria for ED Observation.     INITIAL ASSESSMENT, COURSE AND PLAN  Care Narrative: Is a 43-year-old male with a extensive psychiatric history and diabetes who presents today complaining of tingling and numbness in his right arm and leg.  He denies any falls or trauma.  He does have a slight headache.  He states that he has had a previous stroke in the past he states he is not on any blood thinners.  He does admit to smoking marijuana.  Nothing makes his symptoms better or worse.  On physical exam he has good tone in his muscles but refuses to move his right arm or leg and states that  they feel tingly compared to the other side.  His NIH is 10 however I do not think given his good muscle tone that he is having a stroke.  He could be having atypical migraine versus anxiety.  Because of his elevated NIH score I did consult neurology and ordered a CTA of the head and neck for further evaluation of his symptoms as well as the rest of the labs and EKG per the stroke protocol.     Differential diagnosis: Atypical migraine, CVA, TIA, anxiety, postseizure weakness  ADDITIONAL PROBLEM LIST  Asthma  Bipolar disorder  Depression  Diabetes  Glaucoma  Hypothyroidism  Seizure disorder  Tobacco use  Migraine  DISPOSITION AND DISCUSSIONS    When the patient was examined by the neurology PA he did have better strength and good muscle tone in his arm and his leg.  There was NIH score is high given his exam I do not believe he is having stroke symptoms and is not a alteplase candidate.  I think his problems could be psychiatric versus atypical migraine versus Santosh's paralysis.  I did order CTA of the head and the neck as well as the rest of the work-up per the stroke protocol.    CTA of the head and neck do not show any large vessel occlusions.  He does have a thyroid mass that has been evaluated by ENT and seen on previous studies.  Chest x-ray shows no infiltrate or pleural effusion.     White blood cell count is normal at 9.1 hemoglobin stable at 12.5 he has a normal differential and normal platelets.  Troponin is slightly elevated at 40 EKG has no ischemic changes coags are normal comprehensive metabolic panel is a sodium of 131 chloride 92 potassium normal at 4.5 anion gap is 14 glucose is elevated at 366 liver function tests are normal.    Patient is still not moving his right arm or leg.  He states he does have a severe headache.  This could be a hemiplegic migraine.  Neurology recommended giving him pain medications and a migraine cocktail.  We gave him Toradol Benadryl Reglan morphine and Zofran.  Upon  recheck he was sleeping comfortably but still will not move his right arm or leg.  The patient will be admitted to the hospital at this point for an inpatient MRI and further evaluation.    I have discussed management of the patient with the following physicians and MANNY's: Dr. Ayala neurology who would like to give him a migraine cocktail and see if his symptoms improve  Hospitalist   Dr Hurtado admit the patient for serial neurologic exams and MRI in the hospital  Discussion of management with other QHP or appropriate source(s): None     Escalation of care considered, and ultimately not performed: Alteplase was considered but he was not a candidate given his headache and frequent visits for similar symptoms and multiple neurology visits    Barriers to care at this time, including but not limited to: Patient lacks transportation  and pt has chronic psychiatric problems .     Decision tools and prescription drugs considered including, but not limited to: NIH Stroke Scale 10, however I do not think that this is accurate and more psychological than physical .    FINAL DIAGNOSIS  1. Hemiplegic migraine without status migrainosus, not intractable           Electronically signed by: Nirmala Flanagan M.D., 10/27/2023 1:59 PM

## 2023-10-27 NOTE — H&P
Hospital Medicine History & Physical Note    Date of Service  10/27/2023    Primary Care Physician  Pcp Pt States None    Consultants  neurology    Specialist Names: APRN Thon    Code Status  Prior    Chief Complaint  Chief Complaint   Patient presents with    Possible Stroke     Patient was walking himself to [facility] for his ongoing depression, and he reports he had a sudden onset of RUE/RLE numbness and tingling at 1130am. Denies blood thinners. NIHSS 10 on arrival to hospital. Stroke Alert called by ERP.        History of Presenting Illness  Norm Prabhakar is a 43 y.o. male who presented 10/27/2023 with headache and right-sided weakness.  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  He has had multiple presentations for this.  Patient reports that sometimes when he has his hemiplegic migraines it takes several days for him to improve.  He was seen by neurology in the ED, felt that his current presentation was consistent with his recurrent hemiplegic migraines.  He was given a migraine cocktail of Reglan, Benadryl, Toradol and magnesium.  He did improve however was still weak so observation admission was requested.    I discussed the plan of care with patient.    Review of Systems  Review of Systems   Constitutional:  Negative for chills and fever.   HENT:  Negative for congestion and sore throat.    Respiratory:  Negative for cough and shortness of breath.    Cardiovascular:  Negative for chest pain and palpitations.   Gastrointestinal:  Negative for nausea.   Neurological:  Positive for focal weakness and headaches.       Past Medical History   has a past medical history of Anxiety, ASTHMA, Bipolar 1 disorder ([facility]), Depression, Diabetes ([facility]), Fall, Glaucoma, Glaucoma (1982), Hypothyroidism, Indigestion, Mental disorder, Murmur, Pneumonia, Psychiatric problem (2002), S/P thyroidectomy, Seizure ([facility]) (2010), Seizure disorder  "(HCC), and Unspecified disorder of thyroid.    Surgical History   has a past surgical history that includes eye surgery; thyroid lobectomy; and other.     Family History  family history includes Heart Disease in his mother; Hypertension in his mother; Lung Disease in his mother; Stroke in his maternal grandmother.   Family history reviewed with patient. There is family history that is pertinent to the chief complaint.     Social History   reports that he quit smoking about 3 years ago. His smoking use included cigarettes and cigars. He has never used smokeless tobacco. He reports that he does not currently use alcohol. He reports that he does not currently use drugs after having used the following drugs: Inhaled.    Allergies  Allergies   Allergen Reactions    Abilify      \"Feeling tired, like I don't even know whats going on around me\"    Fish      Pt reports salmon causes him to be sick to his stomach  Not listed on MAR noted 2/3/2021      Geodon [Ziprasidone Hcl] Anaphylaxis     Anaphylaxis per patient    Aripiprazole      \"I became lethargic.\"    Hydroxyzine Itching     Pt will only state \"I feel itchy when I take it    Ziprasidone        Medications  Prior to Admission Medications   Prescriptions Last Dose Informant Patient Reported? Taking?   Insulin Pen Needle 32 G x 4 mm   No No   Sig: Use one pen needle in pen device to inject insulin five times daily.   acetaminophen (TYLENOL) 500 MG Tab   No No   Sig: Take 1 Tablet by mouth every 6 hours as needed for Mild Pain or Moderate Pain.   albuterol 108 (90 Base) MCG/ACT Aero Soln inhalation aerosol   No No   Sig: Inhale 2 Puffs every 6 hours as needed (cough).   atorvastatin (LIPITOR) 10 MG Tab   No No   Sig: Take 1 Tablet by mouth every evening.   benzonatate (TESSALON) 100 MG Cap   No No   Sig: Take 1 Capsule by mouth 3 times a day as needed for Cough.   cephALEXin (KEFLEX) 500 MG Cap   No No   Sig: Take 1 Capsule by mouth 4 times a day.   dicyclomine (BENTYL) " 20 MG Tab   No No   Sig: Take 1 Tablet by mouth every 6 hours.   divalproex (DEPAKOTE) 500 MG Tablet Delayed Response   Yes No   Sig: Take 500-1,500 mg by mouth 2 times a day. 500 mg in AM and 1500 mg at night   docusate sodium (COLACE) 100 MG Cap   No No   Sig: Take 1 Capsule by mouth 2 times a day.   docusate sodium (COLACE) 100 MG Cap   No No   Sig: Take 1 Capsule by mouth 2 times a day.   fenofibrate micronized (LOFIBRA) 67 MG capsule   No No   Sig: Take 1 Capsule by mouth every day.   insulin glargine (LANTUS SOLOSTAR) 100 UNIT/ML Solution Pen-injector injection   No No   Sig: Inject 34 Units under the skin every morning.   levothyroxine (SYNTHROID) 100 MCG Tab   Yes No   Sig: Take 100 mcg by mouth every morning on an empty stomach. Total of 225 mcg once a day   levothyroxine (SYNTHROID) 125 MCG Tab   Yes No   Sig: Take 125 mcg by mouth every morning on an empty stomach. Total of 225 mcg once a day   lurasidone (LATUDA) 80 MG Tab   Yes No   Sig: Take 80 mg by mouth with dinner.   mometasone-formoterol (DULERA) 200-5 MCG/ACT Aerosol   No No   Sig: Inhale 2 Puffs 2 times a day.   ondansetron (ZOFRAN ODT) 4 MG TABLET DISPERSIBLE   No No   Sig: Take 1 Tablet by mouth every 6 hours as needed for Nausea/Vomiting.   polyethylene glycol 3350 (MIRALAX) 17 GM/SCOOP Powder   No No   Sig: Take 17 g by mouth every day.   sertraline (ZOLOFT) 100 MG Tab   Yes No   Sig: Take 100 mg by mouth every day.   traZODone (DESYREL) 50 MG Tab   Yes No   Sig: Take 50 mg by mouth every evening.   triamcinolone acetonide (KENALOG) 0.1 % Ointment   No No   Sig: Apply 1 Application topically 2 times a day for 7 days.      Facility-Administered Medications: None       Physical Exam  Temp:  [37.2 °C (98.9 °F)] 37.2 °C (98.9 °F)  Pulse:  [] 66  Resp:  [15-16] 16  BP: (103-118)/(66-71) 108/67  SpO2:  [91 %-97 %] 91 %  Blood Pressure: 108/67   Temperature: 37.2 °C (98.9 °F)   Pulse: 66   Respiration: 16   Pulse Oximetry: 91 %       Physical  "Exam  Constitutional:       General: He is not in acute distress.     Appearance: He is not toxic-appearing.   HENT:      Head: Normocephalic and atraumatic.      Nose: Nose normal.      Mouth/Throat:      Mouth: Mucous membranes are dry.      Pharynx: Oropharynx is clear.   Eyes:      Extraocular Movements: Extraocular movements intact.      Pupils: Pupils are equal, round, and reactive to light.   Cardiovascular:      Rate and Rhythm: Normal rate and regular rhythm.      Heart sounds: Normal heart sounds. No murmur heard.  Pulmonary:      Effort: Pulmonary effort is normal.      Breath sounds: Normal breath sounds.   Abdominal:      General: There is no distension.      Palpations: Abdomen is soft.      Tenderness: There is no abdominal tenderness.   Musculoskeletal:         General: No swelling or deformity.   Skin:     General: Skin is warm and dry.   Neurological:      Comments: Right-sided upper and lower extremity weakness with normal tone         Laboratory:  Recent Labs     10/27/23  1420   WBC 9.1   RBC 3.93*   HEMOGLOBIN 12.5*   HEMATOCRIT 36.1*   MCV 91.9   MCH 31.8   MCHC 34.6   RDW 42.5   PLATELETCT 324   MPV 10.3     Recent Labs     10/27/23  1420   SODIUM 131*   POTASSIUM 4.5   CHLORIDE 92*   CO2 25   GLUCOSE 366*   BUN 17   CREATININE 1.13   CALCIUM 9.2     Recent Labs     10/27/23  1420   ALTSGPT 21   ASTSGOT 24   ALKPHOSPHAT 104*   TBILIRUBIN 0.3   GLUCOSE 366*     Recent Labs     10/27/23  1420   APTT 33.8   INR 1.09     No results for input(s): \"NTPROBNP\" in the last 72 hours.      Recent Labs     10/27/23  1420   TROPONINT 40*       Imaging:  DX-CHEST-PORTABLE (1 VIEW)   Final Result      No acute cardiopulmonary abnormality.         CT-CTA NECK WITH & W/O-POST PROCESSING   Final Result      1.  No evidence of carotid artery stenosis or occlusion.   2.  Vertebral basilar system intact.   3.  Heterogeneously enhancing mass in the anterior midline of the neck again seen similar to prior studies. "      CT-CTA HEAD WITH & W/O-POST PROCESS   Final Result      1.  There is no large vessel occlusion.   2.  Diffuse low-attenuation of the bilateral white matter.      CT-CEREBRAL PERFUSION ANALYSIS   Final Result      1.  Cerebral blood flow less than 30% likely representing completed infarct = 0 mL.      2.  T Max more than 6 seconds likely representing combination of completed infarct and ischemia = 0 mL.      3.  Mismatched volume likely representing ischemic brain/penumbra = None      4.  Please note that the cerebral perfusion was performed on the limited brain tissue around the basal ganglia region. Infarct/ischemia outside the CT perfusion sections can be missed in this study.      CT-HEAD W/O   Final Result      1.  No acute intracranial hemorrhage or evidence of territorial infarct.   2.  Diffuse bilateral hypodensity throughout the white matter unchanged compared with multiple prior studies including MRI and numerous prior CTs.         MR-BRAIN-W/O    (Results Pending)       Assessment/Plan:  Justification for Admission Status  I anticipate this patient is appropriate for observation status at this time because hemiplegic migraine    Patient will need a Med/Surg bed on EMERGENCY service .  The need is secondary to above.    * Right sided weakness- (present on admission)  Assessment & Plan  With history of hemiplegic migraines.  Similar to prior presentations.  Seen by neurology and felt to be another episode of hemiplegic migraine.  Was slow to improve with migraine cocktail so further observation  Neurochecks  Have placed order for MRI brain, if patient improves clinically then may not be necessary to repeat again  Continue statin        Seizure disorder (HCC)- (present on admission)  Assessment & Plan  Continue divalproex  Seizure precautions    Type 2 diabetes mellitus without complication, without long-term current use of insulin (HCC)- (present on admission)  Assessment & Plan  Insulin basal plus  sliding scale        VTE prophylaxis: SCDs/TEDs and enoxaparin ppx

## 2023-10-27 NOTE — ED NOTES
Unable to complete med rec at this time. Pt lives at [place] (per [facility] pharmacy) - left  for Chloe [phone]

## 2023-10-28 ENCOUNTER — APPOINTMENT (OUTPATIENT)
Dept: RADIOLOGY | Facility: MEDICAL CENTER | Age: 43
End: 2023-10-28
Attending: INTERNAL MEDICINE
Payer: MEDICAID

## 2023-10-28 LAB
AMPHET UR QL SCN: NEGATIVE
BARBITURATES UR QL SCN: NEGATIVE
BENZODIAZ UR QL SCN: NEGATIVE
BZE UR QL SCN: NEGATIVE
CANNABINOIDS UR QL SCN: NEGATIVE
CRP SERPL HS-MCNC: <0.3 MG/DL (ref 0–0.75)
ERYTHROCYTE [SEDIMENTATION RATE] IN BLOOD BY WESTERGREN METHOD: 12 MM/HOUR (ref 0–20)
FENTANYL UR QL: NEGATIVE
GLUCOSE BLD STRIP.AUTO-MCNC: 195 MG/DL (ref 65–99)
GLUCOSE BLD STRIP.AUTO-MCNC: 216 MG/DL (ref 65–99)
GLUCOSE BLD STRIP.AUTO-MCNC: 238 MG/DL (ref 65–99)
METHADONE UR QL SCN: NEGATIVE
OPIATES UR QL SCN: POSITIVE
OXYCODONE UR QL SCN: NEGATIVE
PCP UR QL SCN: NEGATIVE
PROPOXYPH UR QL SCN: NEGATIVE

## 2023-10-28 PROCEDURE — 700111 HCHG RX REV CODE 636 W/ 250 OVERRIDE (IP): Mod: UD | Performed by: INTERNAL MEDICINE

## 2023-10-28 PROCEDURE — A9270 NON-COVERED ITEM OR SERVICE: HCPCS | Mod: UD | Performed by: INTERNAL MEDICINE

## 2023-10-28 PROCEDURE — 85652 RBC SED RATE AUTOMATED: CPT

## 2023-10-28 PROCEDURE — 80307 DRUG TEST PRSMV CHEM ANLYZR: CPT

## 2023-10-28 PROCEDURE — G0378 HOSPITAL OBSERVATION PER HR: HCPCS

## 2023-10-28 PROCEDURE — 70551 MRI BRAIN STEM W/O DYE: CPT

## 2023-10-28 PROCEDURE — 73620 X-RAY EXAM OF FOOT: CPT | Mod: LT

## 2023-10-28 PROCEDURE — 96372 THER/PROPH/DIAG INJ SC/IM: CPT | Mod: XU

## 2023-10-28 PROCEDURE — 99233 SBSQ HOSP IP/OBS HIGH 50: CPT | Performed by: INTERNAL MEDICINE

## 2023-10-28 PROCEDURE — 700102 HCHG RX REV CODE 250 W/ 637 OVERRIDE(OP): Mod: UD | Performed by: INTERNAL MEDICINE

## 2023-10-28 PROCEDURE — 36415 COLL VENOUS BLD VENIPUNCTURE: CPT

## 2023-10-28 PROCEDURE — A9270 NON-COVERED ITEM OR SERVICE: HCPCS | Mod: UD | Performed by: STUDENT IN AN ORGANIZED HEALTH CARE EDUCATION/TRAINING PROGRAM

## 2023-10-28 PROCEDURE — 96376 TX/PRO/DX INJ SAME DRUG ADON: CPT | Mod: XU

## 2023-10-28 PROCEDURE — 82962 GLUCOSE BLOOD TEST: CPT | Mod: 91

## 2023-10-28 PROCEDURE — 97602 WOUND(S) CARE NON-SELECTIVE: CPT

## 2023-10-28 PROCEDURE — 700102 HCHG RX REV CODE 250 W/ 637 OVERRIDE(OP): Mod: UD | Performed by: STUDENT IN AN ORGANIZED HEALTH CARE EDUCATION/TRAINING PROGRAM

## 2023-10-28 PROCEDURE — 97163 PT EVAL HIGH COMPLEX 45 MIN: CPT

## 2023-10-28 PROCEDURE — 97167 OT EVAL HIGH COMPLEX 60 MIN: CPT

## 2023-10-28 PROCEDURE — 86140 C-REACTIVE PROTEIN: CPT

## 2023-10-28 RX ORDER — CEPHALEXIN 500 MG/1
500 CAPSULE ORAL EVERY 6 HOURS
Status: DISCONTINUED | OUTPATIENT
Start: 2023-10-28 | End: 2023-10-31 | Stop reason: HOSPADM

## 2023-10-28 RX ORDER — LEVOTHYROXINE SODIUM 0.12 MG/1
250 TABLET ORAL
Status: DISCONTINUED | OUTPATIENT
Start: 2023-10-29 | End: 2023-10-31 | Stop reason: HOSPADM

## 2023-10-28 RX ORDER — KETOROLAC TROMETHAMINE 30 MG/ML
15 INJECTION, SOLUTION INTRAMUSCULAR; INTRAVENOUS EVERY 6 HOURS PRN
Status: DISCONTINUED | OUTPATIENT
Start: 2023-10-28 | End: 2023-10-31 | Stop reason: HOSPADM

## 2023-10-28 RX ORDER — OXYCODONE HYDROCHLORIDE 10 MG/1
10 TABLET ORAL EVERY 4 HOURS PRN
Status: DISCONTINUED | OUTPATIENT
Start: 2023-10-28 | End: 2023-10-31 | Stop reason: HOSPADM

## 2023-10-28 RX ORDER — OXYCODONE HYDROCHLORIDE 5 MG/1
5 TABLET ORAL EVERY 4 HOURS PRN
Status: DISCONTINUED | OUTPATIENT
Start: 2023-10-28 | End: 2023-10-31 | Stop reason: HOSPADM

## 2023-10-28 RX ORDER — IBUPROFEN 600 MG/1
600 TABLET ORAL EVERY 6 HOURS PRN
Status: DISCONTINUED | OUTPATIENT
Start: 2023-10-28 | End: 2023-10-28

## 2023-10-28 RX ORDER — INSULIN LISPRO 100 [IU]/ML
1-6 INJECTION, SOLUTION INTRAVENOUS; SUBCUTANEOUS
Status: DISCONTINUED | OUTPATIENT
Start: 2023-10-28 | End: 2023-10-31 | Stop reason: HOSPADM

## 2023-10-28 RX ADMIN — INSULIN LISPRO 1 UNITS: 100 INJECTION, SOLUTION INTRAVENOUS; SUBCUTANEOUS at 21:24

## 2023-10-28 RX ADMIN — LEVOTHYROXINE SODIUM 25 MCG: 0.03 TABLET ORAL at 06:24

## 2023-10-28 RX ADMIN — INSULIN LISPRO 2 UNITS: 100 INJECTION, SOLUTION INTRAVENOUS; SUBCUTANEOUS at 06:29

## 2023-10-28 RX ADMIN — KETOROLAC TROMETHAMINE 15 MG: 30 INJECTION, SOLUTION INTRAMUSCULAR; INTRAVENOUS at 15:10

## 2023-10-28 RX ADMIN — ATORVASTATIN CALCIUM 10 MG: 10 TABLET, FILM COATED ORAL at 18:11

## 2023-10-28 RX ADMIN — INSULIN LISPRO 2 UNITS: 100 INJECTION, SOLUTION INTRAVENOUS; SUBCUTANEOUS at 18:14

## 2023-10-28 RX ADMIN — INSULIN GLARGINE-YFGN 18 UNITS: 100 INJECTION, SOLUTION SUBCUTANEOUS at 18:15

## 2023-10-28 RX ADMIN — CEPHALEXIN 500 MG: 500 CAPSULE ORAL at 21:24

## 2023-10-28 RX ADMIN — DIVALPROEX SODIUM 500 MG: 500 TABLET, DELAYED RELEASE ORAL at 06:26

## 2023-10-28 RX ADMIN — LEVOTHYROXINE SODIUM 200 MCG: 0.2 TABLET ORAL at 06:27

## 2023-10-28 RX ADMIN — INSULIN LISPRO 2 UNITS: 100 INJECTION, SOLUTION INTRAVENOUS; SUBCUTANEOUS at 12:14

## 2023-10-28 RX ADMIN — LURASIDONE HYDROCHLORIDE 80 MG: 40 TABLET, FILM COATED ORAL at 18:10

## 2023-10-28 RX ADMIN — IBUPROFEN 600 MG: 600 TABLET, FILM COATED ORAL at 07:58

## 2023-10-28 RX ADMIN — CEPHALEXIN 500 MG: 500 CAPSULE ORAL at 15:10

## 2023-10-28 RX ADMIN — DIVALPROEX SODIUM 1500 MG: 500 TABLET, DELAYED RELEASE ORAL at 18:10

## 2023-10-28 RX ADMIN — SERTRALINE 200 MG: 100 TABLET, FILM COATED ORAL at 06:26

## 2023-10-28 ASSESSMENT — PAIN DESCRIPTION - PAIN TYPE
TYPE: ACUTE PAIN

## 2023-10-28 ASSESSMENT — ACTIVITIES OF DAILY LIVING (ADL): TOILETING: INDEPENDENT

## 2023-10-28 ASSESSMENT — COGNITIVE AND FUNCTIONAL STATUS - GENERAL
DRESSING REGULAR LOWER BODY CLOTHING: A LOT
DAILY ACTIVITIY SCORE: 12
EATING MEALS: A LOT
SUGGESTED CMS G CODE MODIFIER DAILY ACTIVITY: CL
WALKING IN HOSPITAL ROOM: A LOT
PERSONAL GROOMING: A LOT
SUGGESTED CMS G CODE MODIFIER MOBILITY: CM
MOVING FROM LYING ON BACK TO SITTING ON SIDE OF FLAT BED: UNABLE
MOBILITY SCORE: 9
MOVING TO AND FROM BED TO CHAIR: UNABLE
HELP NEEDED FOR BATHING: A LOT
DRESSING REGULAR UPPER BODY CLOTHING: A LOT
CLIMB 3 TO 5 STEPS WITH RAILING: TOTAL
STANDING UP FROM CHAIR USING ARMS: A LOT
TOILETING: A LOT
TURNING FROM BACK TO SIDE WHILE IN FLAT BAD: A LOT

## 2023-10-28 ASSESSMENT — GAIT ASSESSMENTS: GAIT LEVEL OF ASSIST: UNABLE TO PARTICIPATE

## 2023-10-28 NOTE — WOUND TEAM
[facility]  Inpatient Services  Initial Wound and Skin Care Evaluation    Admission Date: 10/27/2023     Last order of IP CONSULT TO WOUND CARE was found on 10/28/2023 from Hospital Encounter on 10/27/2023     HPI, PMH, SH: Reviewed    Past Surgical History:   Procedure Laterality Date    EYE SURGERY      OTHER      Hernia Repair when he was 10 yrs old    THYROID LOBECTOMY       Social History     Tobacco Use    Smoking status: Former     Current packs/day: 0.00     Types: Cigarettes, Cigars     Quit date: 4/1/2020     Years since quitting: 3.5    Smokeless tobacco: Never   Substance Use Topics    Alcohol use: Not Currently     Comment: occasionally     Chief Complaint   Patient presents with    Possible Stroke     Patient was walking himself to [facility] for his ongoing depression, and he reports he had a sudden onset of RUE/RLE numbness and tingling at 1130am. Denies blood thinners. NIHSS 10 on arrival to hospital. Stroke Alert called by ERP.      Diagnosis: Right sided weakness [R53.1]    Unit where seen by Wound Team: T203/00     WOUND CONSULT RELATED TO:  L 2nd toe    WOUND TEAM PLAN OF CARE - Frequency of Follow-up:   Nursing to follow dressing orders written for wound care. Contact wound team if area fails to progress, deteriorates or with any questions/concerns if something comes up before next scheduled follow up (See below as to whether wound is following and frequency of wound follow up)   Weekly - L 2nd toe    WOUND HISTORY:   43 y.o. male who stated he hit his toe 3-4 days ago on the edge of a counter and noticed worsening pain to the area. States he has not been able to wear shoes because of the pain.       WOUND ASSESSMENT/LDA  Wound 10/28/23 Partial Thickness Wound Toe, 2nd Left (Active)   Date First Assessed/Time First Assessed: 10/28/23 1447   Present on Original Admission: Yes  Primary Wound Type: Partial Thickness Wound  Location: Toe, 2nd  Laterality: Left      Assessments 10/28/2023   2:00 PM   Wound Image     Site Assessment Slough;Painful;Red   Periwound Assessment Maceration   Margins Defined edges;Attached edges   Closure Secondary intention   Drainage Amount Small   Drainage Description Purulent;Serosanguineous   Treatments Cleansed   Wound Cleansing Normal Saline Irrigation   Periwound Protectant Skin Protectant Wipes to Periwound   Dressing Status Clean;Dry;Intact   Dressing Changed Changed   Dressing Cleansing/Solutions Not Applicable   Dressing Options Hydrofiber Silver;Nonadhesive Foam;Tape   Dressing Change/Treatment Frequency Every 48 hrs, and As Needed   NEXT Dressing Change/Treatment Date 10/30/23   NEXT Weekly Photo (Inpatient Only) 11/04/23   Wound Team Following Weekly   Non-staged Wound Description Partial thickness   Wound Length (cm) 0.1 cm   Wound Width (cm) 0.1 cm   Wound Surface Area (cm^2) 0.01 cm^2   Shape 2 dots   Wound Odor None   Pulses 2+;Left;DP   Exposed Structures JUANCARLOS        Vascular:    CYN:   No results found.    Lab Values:    Lab Results   Component Value Date/Time    WBC 9.1 10/27/2023 02:20 PM    RBC 3.93 (L) 10/27/2023 02:20 PM    HEMOGLOBIN 12.5 (L) 10/27/2023 02:20 PM    HEMATOCRIT 36.1 (L) 10/27/2023 02:20 PM    CREACTPROT 0.13 02/29/2020 05:08 PM    SEDRATEWES 5 02/29/2020 05:08 PM    HBA1C 11.4 (H) 10/27/2023 02:20 PM         Culture Results show:  No results found for this or any previous visit (from the past 720 hour(s)).    Pain Level/Medicated:  None, Tolerated without pain medication       INTERVENTIONS BY WOUND TEAM:  Chart and images reviewed. Discussed with bedside RN. All areas of concern (based on picture review, LDA review and discussion with bedside RN) have been thoroughly assessed. Documentation of areas based on significant findings. This RN in to assess patient. Performed standard wound care which includes appropriate positioning, dressing removal and non-selective debridement. Pictures and measurements obtained weekly if/when  required.    Wound:  L 2nd Toe  Preparation for Dressing removal: Removed without difficulty  Cleansed/Non-selectively Debrided with:  Normal Saline and Gauze  Grace wound: Cleansed with Normal Saline and Gauze, Prepped with No Sting  Primary Dressing:  Aquacel Ag  Secondary (Outer) Dressing: Non-adhesive foam and tape    Advanced Wound Care Discharge Planning  Number of Clinicians necessary to complete wound care: 1  Is patient requiring IV pain medications for dressing changes:  No   Length of time for dressing change 5 min. (This does not include chart review, pre-medication time, set up, clean up or time spent charting.)    Interdisciplinary consultation: Patient, Bedside RN (Ehsan), Dr. Cabrera    EVALUATION / RATIONALE FOR TREATMENT:     Date:  10/28/23  Wound Status:  Initial evaluation    L 2nd toe with pain, erythema, and small amounts of expressible purulence from two pinhole sized areas on L toe. Surrounding tissue denuded. Aquacel Ag Hydrofiber applied to manage bioburden, absorb exudate, and maintain a moist wound environment without laterally wicking exudate therefore reducing grace-wound maceration.  Patient may benefit from X-ray to rule out deeper infection and initiation of antibiotics given signs of active infection, Dr. Cabrera updated on recommendations.         Goals: Steady decrease in wound area and depth weekly.    NURSING PLAN OF CARE ORDERS:  Dressing changes: See Dressing Care orders    NUTRITION RECOMMENDATIONS   Wound Team Recommendations:  N/A    DIET ORDERS (From admission to next 24h)       Start     Ordered    10/27/23 2312  Diet Order Diet: Consistent CHO (Diabetic)  ALL MEALS        Question:  Diet:  Answer:  Consistent CHO (Diabetic)    10/27/23 2311                    PREVENTATIVE INTERVENTIONS:    Q shift Demar - performed per nursing policy  Q shift pressure point assessments - performed per nursing policy    Surface/Positioning  Standard/trauma mattress - Currently in  Place    Anticipated discharge plans:  TBD        Vac Discharge Needs:  Vac Discharge plan is purely a recommendation from wound team and not a requirement for discharge unless otherwise stated by physician.  Not Applicable Pt not on a wound vac

## 2023-10-28 NOTE — CARE PLAN
The patient is Stable - Low risk of patient condition declining or worsening    Shift Goals  Clinical Goals: MRI  Patient Goals: Pain control    Progress made toward(s) clinical / shift goals:    Problem: Pain - Standard  Goal: Alleviation of pain or a reduction in pain to the patient’s comfort goal  Outcome: Progressing   Pain managed well with PRN medication at this time.  Patient uses pharmacological and non pharmacological pain-relief strategies. Patient displays improvement in mood, coping.     Problem: Knowledge Deficit - Standard  Goal: Patient and family/care givers will demonstrate understanding of plan of care, disease process/condition, diagnostic tests and medications  Outcome: Progressing       Patient is not progressing towards the following goals:

## 2023-10-28 NOTE — ASSESSMENT & PLAN NOTE
- Continue Lantus 18 units at bedtime along with sliding scale insulin coverage. Accu-Cheks before meals and at bedtime. Goal to keep BG between 140-180 per 2019 ADA guidelines.  Continue diabetic diet.

## 2023-10-28 NOTE — RESPIRATORY CARE
"   COPD EDUCATION by COPD CLINICAL EDUCATOR  10/28/2023 at 6:38 AM by Viral Paul, RRT     Patient reviewed by COPD education team. Patient does not have a history or diagnosis of COPD and is a non-smoker.  Therefore, patient does not qualify for the COPD program.        COPD Assessment  COPD Clinical Specialists ONLY  COPD Education Initiated: No--Quick Screen (Asthma nonsmoker here with right sided weakness.)  Interdisciplinary Rounds: Attendance at Rounds (30 Min)    PFT Results    No results found for: \"PFT\"    Meds to Beds  Would the patient like to opt in for Bedside Medication Delivery at Discharge?: Unable to ask                 "

## 2023-10-28 NOTE — THERAPY
Physical Therapy   Initial Evaluation     Patient Name: Norm Prabhakar  Age:  43 y.o., Sex:  male  Medical Record #: [id number]  Today's Date: 10/28/2023     Precautions  Precautions: Fall Risk    Assessment  Pt is a 42 y/o male who presents with R sided weakness and headache/migrane.  PT eval complete,  pt stated 10/10 migrane upon subjective, required Jero with supine<>EOB, sit <>stand, stand<>supine especially on R side both upper and lower. He became dizzy with change of positions and required time and water to let subside. With formal MMT of RLE pt demonstrated 1/5 strength in ankle DF, knee ext; however, functionally in standing pt demonstrated adequate RLE strength to maintain equal WB and single leg stance with small steps at EOB. No buckling of RLE observed in standing.  Pt reported loss of light touch sensation on R UE/LE. Overall inconsistent functional presentation to objective findings. Pt will continue to benefit from  post acute therapy services and will continue to be followed while admitted for skilled PT interventions.       Plan    Physical Therapy Initial Treatment Plan   Treatment Plan : Bed Mobility, Gait Training, Neuro Re-Education / Balance, Therapeutic Activities, Therapeutic Exercise, Equipment, Stair Training, Self Care / Home Evaluation  Treatment Frequency: 4 Times per Week  Duration: Until Therapy Goals Met    DC Equipment Recommendations: Unable to determine at this time  Discharge Recommendations: Recommend post-acute placement for additional physical therapy services prior to discharge home (pt may progress to home as pain allows function to improve)       Objective     10/28/23 0935    Services   Is patient using  services for this encounter? No   Language Interpreted English   Initial Contact Note    Initial Contact Note Order Received and Verified, Physical Therapy Evaluation in Progress with Full Report to Follow.   Precautions   Precautions Fall Risk    Vitals   O2 Delivery Device None - Room Air   Pain   Pain Scales 0 to 10 Scale    Pain 0 - 10 Group   Location Head   Location Orientation Anterior   Pain Rating Scale (NPRS) 10  (Pt stated he was at a 10/10 migrane when walking in and performing subjective)   Description Aching   Therapist Pain Assessment Nurse Notified  (pt stated he had a 10/10 migrane before starting subjective)   Prior Living Situation   Prior Services Home-Independent   Housing / Facility 1 Story Apartment / Condo   Steps Into Home 0   Steps In Home 0   Equipment Owned None   Lives with - Patient's Self Care Capacity Alone and Able to Care For Self   Prior Level of Functional Mobility   Bed Mobility Independent   Transfer Status Independent   Ambulation Independent   Ambulation Distance community   Assistive Devices Used None   Stairs Independent   History of Falls   History of Falls No   Cognition    Cognition / Consciousness X   Speech/ Communication Delayed Responses;Slurred;Incomprehensible Words  (stuttering as well)   Level of Consciousness Responds to voice   Comments pt was responsive to all questions asked by PT/OT, stuttering from pt made it difficult to understand at points   Passive ROM Lower Body   Passive ROM Lower Body WDL   Comments B LE   Active ROM Lower Body    Active ROM Lower Body  X   Comments LLE w/in functional limits. RLE limited to ROM with formal testing. Displayed RLE ROM limited weakness.   Strength Lower Body   Lower Body Strength  X   Rt Knee Extension Strength 1 (T)   Rt Ankle Dorsiflexion Strength 1 (T)   Sensation Lower Body   Lower Extremity Sensation   Not Tested   Balance Assessment   Sitting Balance (Static) Fair -   Sitting Balance (Dynamic) Fair -   Standing Balance (Static) Poor   Standing Balance (Dynamic) Poor   Weight Shift Sitting Fair   Weight Shift Standing Poor   Bed Mobility    Supine to Sit Minimal Assist   Sit to Supine Minimal Assist   Scooting Supervised   Rolling Supervised   Comments pt  required Jero due to not being able to push up from surface w/ LUE and needed help adjusting RLE to swing to EOB   Gait Analysis   Gait Level Of Assist Unable to Participate   Weight Bearing Status FWB   Comments limited by headache and volition   Functional Mobility   Sit to Stand Minimal Assist   Bed, Chair, Wheelchair Transfer Unable to Participate   Mobility Pt required Jero when sit<>stand and when taking 2 sidesteps to help reposition on bed prior to laying back down.   How much difficulty does the patient currently have...   Turning over in bed (including adjusting bedclothes, sheets and blankets)? 2   Sitting down on and standing up from a chair with arms (e.g., wheelchair, bedside commode, etc.) 1   Moving from lying on back to sitting on the side of the bed? 1   How much help from another person does the patient currently need...   Moving to and from a bed to a chair (including a wheelchair)? 2   Need to walk in a hospital room? 2   Climbing 3-5 steps with a railing? 1   6 clicks Mobility Score 9   Activity Tolerance   Sitting Edge of Bed >5 mins  (pt became dizzy when sat up to EOB)   Standing 3 mins  (Pt was able to stand, dizziness still persisted when moving two steps, required Jero from OT/PT on both sides)   Short Term Goals    Short Term Goal # 1 pt able to perform supine to from sit w/o bed features with SPV in 6 visits   Short Term Goal # 2 pt will perform sit to stand w/ LRAD and SPV to improve mobility independence in 6 visits   Short Term Goal # 3 pt will ambulate >200 ft w/ LRAD and SPV to access community in 6 visits   Education Group   Education Provided Role of Physical Therapist   Role of Physical Therapist Patient Response Patient;Acceptance;Explanation;Verbal Demonstration   Physical Therapy Initial Treatment Plan    Treatment Plan  Bed Mobility;Gait Training;Neuro Re-Education / Balance;Therapeutic Activities;Therapeutic Exercise;Equipment;Stair Training;Self Care / Home Evaluation    Treatment Frequency 4 Times per Week   Duration Until Therapy Goals Met   Problem List    Problems Impaired Balance;Functional Strength Deficit;Functional ROM Deficit;Impaired Ambulation;Impaired Transfers;Impaired Bed Mobility;Impaired Coordination;Decreased Activity Tolerance;Safety Awareness Deficits / Cognition;Motor Planning / Sequencing;Pain   Anticipated Discharge Equipment and Recommendations   DC Equipment Recommendations Unable to determine at this time   Discharge Recommendations Recommend post-acute placement for additional physical therapy services prior to discharge home  (pt may progress to home as pain allows function to improve)   Interdisciplinary Plan of Care Collaboration   IDT Collaboration with  Occupational Therapist;Nursing   Patient Position at End of Therapy In Bed;Bed Alarm On;Call Light within Reach   Collaboration Comments aware of PT eval and recs   Session Information   Date / Session Number  10/28-1 (1/4, 11/3)

## 2023-10-28 NOTE — THERAPY
Occupational Therapy   Initial Evaluation     Patient Name: Norm Prabhakar  Age:  43 y.o., Sex:  male  Medical Record #: [id number]  Today's Date: 10/28/2023     Precautions  Precautions: Fall Risk    Assessment  Patient is 43 y.o. male with a diagnosis of Possible CVA, R side weakness.  Pt currently limited by decreased functional mobility, activity tolerance, sensation, strength, AROM, coordination, balance, and pain which are affecting pt's ability to complete ADLs/IADLs at baseline. Pt would benefit from OT services in the acute care setting to maximize functional recovery.      Plan    Occupational Therapy Initial Treatment Plan   Treatment Interventions: (P) Self Care / Activities of Daily Living, Therapeutic Activity  Treatment Frequency: (P) 3 Times per Week  Duration: (P) Until Therapy Goals Met       Discharge Recommendations: (P) Recommend post-acute placement for additional occupational therapy services prior to discharge home        10/28/23 0916   Prior Living Situation   Prior Services Home-Independent   Housing / Facility 1 Story House   Steps Into Home 0   Steps In Home 0   Equipment Owned None   Lives with - Patient's Self Care Capacity Alone and Able to Care For Self   Prior Level of ADL Function   Self Feeding Independent   Grooming / Hygiene Independent   Bathing Independent   Dressing Independent   Toileting Independent   Active ROM Upper Body   Active ROM Upper Body  X   Comments on entering the pt's room, noted him moving hid R UE, noted inconsistency when formaly testing pt had no movement in R UE   ADL Assessment   Grooming Moderate Assist   Upper Body Dressing Maximal Assist   Lower Body Dressing Maximal Assist   Toileting Maximal Assist   Functional Mobility   Comments able to take side steps with min assist   Short Term Goals   Short Term Goal # 1 supervised with UB dressing   Short Term Goal # 2 supervised with LB dressing   Short Term Goal # 3 supervised with ADL txfs   Occupational  Therapy Initial Treatment Plan    Treatment Interventions Self Care / Activities of Daily Living;Therapeutic Activity   Treatment Frequency 3 Times per Week   Duration Until Therapy Goals Met   Anticipated Discharge Equipment and Recommendations   Discharge Recommendations Recommend post-acute placement for additional occupational therapy services prior to discharge home

## 2023-10-28 NOTE — PROGRESS NOTES
Hospital Medicine Daily Progress Note    Date of Service  10/28/2023    Chief Complaint  Right-sided numbness and tingling    Hospital Course  Norm Prabhakar is a 43 y.o. male with asthma, bipolar disorder, poorly controlled diabetes, hypothyroidism, seizure disorder vs PNES, and history of hemiplegic migraine, admitted 10/27/2023 with headache and right-sided weakness/numbness.  On evaluation WBC was 9100, sodium 131, glucose 366, creatinine 1.13.  LFTs were unremarkable.  Troponin was 40.  Chest x-ray showed nothing acute.  CT of the head and neck did not show any large vessel occlusion or hemodynamically significant stenosis.  Perfusion head CT was negative.  Neurology was consulted who suspected that symptoms were functional or migrainous in etiology.  Patient was given migraine cocktail but symptoms were slow to improve and thus neurology recommended brain MRI and further observation.      Interval Problem Update  10/28/2023 - I reviewed the patient's chart. There were no significant overnight events. Remains hemodynamically stable and afebrile. Stable on RA.  .  , free T4 <0.11.    > I have personally seen and examined the patient today.  Had some mild headache this morning, requested ibuprofen.  Denies any chest pain, shortness of breath, nausea or vomiting.    I personally reviewed all lab results mentioned above. Prior medical records from this institution and outside facilities were independently reviewed as noted. I also personally reviewed all ER physician and consultant recommendations and plans as documented above. History was independently obtained by myself. I have discussed this patient's plan of care and discharge plan at IDT rounds today with Case Management, Nursing, Nursing leadership, and other members of the IDT team.    Consultants/Specialty  neurology    Code Status  Full Code    Disposition  The patient is not medically cleared for discharge to home or a post-acute  facility.      Anticipate discharge to home once medically cleared.    I have placed the appropriate orders for post-discharge needs.    Review of Systems  ROS     Pertinent positives/negatives as mentioned above.     A complete review of systems was personally done by me. All other systems were negative.       Physical Exam  Temp:  [36.2 °C (97.2 °F)-37.2 °C (98.9 °F)] 36.8 °C (98.2 °F)  Pulse:  [] 67  Resp:  [14-17] 14  BP: ()/(58-79) 101/63  SpO2:  [91 %-97 %] 91 %    Physical Exam  Vitals reviewed.   Constitutional:       General: He is not in acute distress.     Appearance: Normal appearance. He is not toxic-appearing or diaphoretic.   HENT:      Head: Normocephalic and atraumatic.      Right Ear: External ear normal.      Left Ear: External ear normal.      Mouth/Throat:      Mouth: Mucous membranes are moist.      Pharynx: No oropharyngeal exudate.   Eyes:      General: No scleral icterus.     Extraocular Movements: Extraocular movements intact.      Conjunctiva/sclera: Conjunctivae normal.      Pupils: Pupils are equal, round, and reactive to light.   Cardiovascular:      Rate and Rhythm: Normal rate and regular rhythm.      Heart sounds: Normal heart sounds. No murmur heard.     No gallop.   Pulmonary:      Effort: Pulmonary effort is normal. No respiratory distress.      Breath sounds: Normal breath sounds. No stridor. No wheezing, rhonchi or rales.   Chest:      Chest wall: No tenderness.   Abdominal:      General: Bowel sounds are normal. There is no distension.      Palpations: Abdomen is soft. There is no mass.      Tenderness: There is no abdominal tenderness. There is no guarding or rebound.   Musculoskeletal:         General: No swelling. Normal range of motion.      Cervical back: Normal range of motion and neck supple.      Right lower leg: No edema.      Left lower leg: No edema.   Lymphadenopathy:      Cervical: No cervical adenopathy.   Skin:     General: Skin is warm and dry.       Coloration: Skin is not jaundiced.      Findings: No rash.   Neurological:      General: No focal deficit present.      Mental Status: He is alert and oriented to person, place, and time.      Cranial Nerves: No cranial nerve deficit.      Comments: Stuttered speech   Psychiatric:         Mood and Affect: Mood normal.         Behavior: Behavior normal.         Thought Content: Thought content normal.         Judgment: Judgment normal.         Fluids    Intake/Output Summary (Last 24 hours) at 10/28/2023 1337  Last data filed at 10/28/2023 0600  Gross per 24 hour   Intake 100 ml   Output 500 ml   Net -400 ml       Laboratory  Recent Labs     10/27/23  1420   WBC 9.1   RBC 3.93*   HEMOGLOBIN 12.5*   HEMATOCRIT 36.1*   MCV 91.9   MCH 31.8   MCHC 34.6   RDW 42.5   PLATELETCT 324   MPV 10.3     Recent Labs     10/27/23  1420   SODIUM 131*   POTASSIUM 4.5   CHLORIDE 92*   CO2 25   GLUCOSE 366*   BUN 17   CREATININE 1.13   CALCIUM 9.2     Recent Labs     10/27/23  1420   APTT 33.8   INR 1.09         Recent Labs     10/27/23  1420   TRIGLYCERIDE 1123*   HDL 31*   LDL see below       Imaging  DX-CHEST-PORTABLE (1 VIEW)   Final Result      No acute cardiopulmonary abnormality.         CT-CTA NECK WITH & W/O-POST PROCESSING   Final Result      1.  No evidence of carotid artery stenosis or occlusion.   2.  Vertebral basilar system intact.   3.  Heterogeneously enhancing mass in the anterior midline of the neck again seen similar to prior studies.      CT-CTA HEAD WITH & W/O-POST PROCESS   Final Result      1.  There is no large vessel occlusion.   2.  Diffuse low-attenuation of the bilateral white matter.      CT-CEREBRAL PERFUSION ANALYSIS   Final Result      1.  Cerebral blood flow less than 30% likely representing completed infarct = 0 mL.      2.  T Max more than 6 seconds likely representing combination of completed infarct and ischemia = 0 mL.      3.  Mismatched volume likely representing ischemic brain/penumbra = None       4.  Please note that the cerebral perfusion was performed on the limited brain tissue around the basal ganglia region. Infarct/ischemia outside the CT perfusion sections can be missed in this study.      CT-HEAD W/O   Final Result      1.  No acute intracranial hemorrhage or evidence of territorial infarct.   2.  Diffuse bilateral hypodensity throughout the white matter unchanged compared with multiple prior studies including MRI and numerous prior CTs.         MR-BRAIN-W/O    (Results Pending)        Assessment/Plan  * Right sided weakness- (present on admission)  Assessment & Plan  -With history of hemiplegic migraines.  Similar to prior presentations.  Seen by neurology and felt to be another episode of hemiplegic migraine. Was slow to improve with migraine cocktail so further observation.  -Continue neurochecks.  -Awaiting brain MRI.  -Continue statin.    Seizure disorder (HCC)- (present on admission)  Assessment & Plan  - No active seizures.  Continue divalproex.  Maintain on seizure precautions.    Type 2 diabetes mellitus without complication, without long-term current use of insulin (HCC)- (present on admission)  Assessment & Plan  - Continue Lantus 18 units at bedtime along with sliding scale insulin coverage. Accu-Cheks before meals and at bedtime. Goal to keep BG between 140-180 per 2019 ADA guidelines.  Continue diabetic diet.         VTE prophylaxis: SCD      My total time spent caring for the patient on the day of the encounter was 50 minutes. This does not include time spent on separately billable procedures/tests.

## 2023-10-28 NOTE — PROGRESS NOTES
"Patient arrived to CDU by transport right before shift change. Patient was moved by slide board to new bed. Placed moon boots on to float heels. Patient has difficulty speaking which I was told by ER nurse is baseline. When attempting to complete NIH score, patient is unable to move right side of body when asked. Patient c/o \"no sensation to right arm and right leg.\" However, this RN witnessed patient grab and hold his left arm with right hand while boosting him up in bed. Slight droop to right side of face, patient unable to raise eye brows or close eyes shut tight when asked. Patient is able to answer questions appropriately but slowly.   "

## 2023-10-28 NOTE — PROGRESS NOTES
4 Eyes Skin Assessment Completed by OMID Hernandez and OMID Lopez.    Head WDL  Ears WDL  Nose WDL  Mouth WDL  Neck WDL  Breast/Chest WDL  Shoulder Blades WDL  Spine WDL  (R) Arm/Elbow/Hand WDL  (L) Arm/Elbow/Hand WDL  Abdomen WDL  Groin WDL  Scrotum/Coccyx/Buttocks WDL  (R) Leg  WDL  (L) Leg large multiple scabs on knee  (R) Heel/Foot/Toe extreme dryness, callous and fungus on nails, cracks in callous  (L) Heel/Foot/Toe red, excorriated second toe, was covered in dirty gauze. Extreme dry callous and cracks, fungus, thick toenails.          Devices In Places Blood Pressure Cuff and Pulse Ox      Interventions In Place Heel Float Boots and Pillows    Possible Skin Injury Yes    Pictures Uploaded Into Epic Yes  Wound Consult Placed Yes  RN Wound Prevention Protocol Ordered Yes

## 2023-10-28 NOTE — ED NOTES
Med rec complete per Chloe ([facility]dependent [facility]) and pt     Alert and oriented to person, place and time, memory intact, behavior appropriate to situation, PERRL.

## 2023-10-28 NOTE — ASSESSMENT & PLAN NOTE
-With history of hemiplegic migraines.  Similar to prior presentations.  Seen by neurology and felt to be another episode of hemiplegic migraine. Was slow to improve with migraine cocktail so further observation.  Brain MRI showed unchanged severe supratentorial leukoencephalopathy with no acute findings.  -Suspect functional component.  -Continue statin.  -PT/OT recommending postacute placement, but may be difficult as he is observation and also has Medicaid FFS.

## 2023-10-28 NOTE — ED NOTES
After migraine cocktail completed pt reassessed for symptoms. Pt reports no improvement in symptoms and endorses persistent headache.

## 2023-10-28 NOTE — CARE PLAN
Problem: Pain - Standard  Goal: Alleviation of pain or a reduction in pain to the patient’s comfort goal  Outcome: Progressing     Problem: Knowledge Deficit - Standard  Goal: Patient and family/care givers will demonstrate understanding of plan of care, disease process/condition, diagnostic tests and medications  Outcome: Progressing     Problem: Skin Integrity  Goal: Skin integrity is maintained or improved  Outcome: Progressing     Problem: Depression  Goal: Patient and family/caregiver will verbalize accurate information about at least two of the possible causes of depression, three-four of the signs and symptoms of depression  Outcome: Progressing     The patient is Stable - Low risk of patient condition declining or worsening    Shift Goals  Clinical Goals: UA, MRI pending, PT/OT eval, monitor blood sugar and nuero checks.  Patient Goals: Rest.    Progress made toward(s) clinical / shift goals:  yes    Patient is not progressing towards the following goals:MRI still pending

## 2023-10-29 LAB
ANION GAP SERPL CALC-SCNC: 14 MMOL/L (ref 7–16)
BUN SERPL-MCNC: 28 MG/DL (ref 8–22)
CALCIUM SERPL-MCNC: 9 MG/DL (ref 8.5–10.5)
CHLORIDE SERPL-SCNC: 96 MMOL/L (ref 96–112)
CO2 SERPL-SCNC: 24 MMOL/L (ref 20–33)
CREAT SERPL-MCNC: 1.22 MG/DL (ref 0.5–1.4)
ERYTHROCYTE [DISTWIDTH] IN BLOOD BY AUTOMATED COUNT: 46.6 FL (ref 35.9–50)
GFR SERPLBLD CREATININE-BSD FMLA CKD-EPI: 76 ML/MIN/1.73 M 2
GLUCOSE BLD STRIP.AUTO-MCNC: 170 MG/DL (ref 65–99)
GLUCOSE BLD STRIP.AUTO-MCNC: 184 MG/DL (ref 65–99)
GLUCOSE BLD STRIP.AUTO-MCNC: 205 MG/DL (ref 65–99)
GLUCOSE BLD STRIP.AUTO-MCNC: 222 MG/DL (ref 65–99)
GLUCOSE SERPL-MCNC: 183 MG/DL (ref 65–99)
HCT VFR BLD AUTO: 40.6 % (ref 42–52)
HGB BLD-MCNC: 12.8 G/DL (ref 14–18)
MCH RBC QN AUTO: 30.6 PG (ref 27–33)
MCHC RBC AUTO-ENTMCNC: 31.5 G/DL (ref 32.3–36.5)
MCV RBC AUTO: 97.1 FL (ref 81.4–97.8)
PLATELET # BLD AUTO: 362 K/UL (ref 164–446)
PMV BLD AUTO: 10.4 FL (ref 9–12.9)
POTASSIUM SERPL-SCNC: 4.3 MMOL/L (ref 3.6–5.5)
RBC # BLD AUTO: 4.18 M/UL (ref 4.7–6.1)
SODIUM SERPL-SCNC: 134 MMOL/L (ref 135–145)
WBC # BLD AUTO: 10 K/UL (ref 4.8–10.8)

## 2023-10-29 PROCEDURE — 96372 THER/PROPH/DIAG INJ SC/IM: CPT | Mod: XU

## 2023-10-29 PROCEDURE — A9270 NON-COVERED ITEM OR SERVICE: HCPCS | Mod: UD | Performed by: INTERNAL MEDICINE

## 2023-10-29 PROCEDURE — 99232 SBSQ HOSP IP/OBS MODERATE 35: CPT | Performed by: INTERNAL MEDICINE

## 2023-10-29 PROCEDURE — 700102 HCHG RX REV CODE 250 W/ 637 OVERRIDE(OP): Mod: UD | Performed by: STUDENT IN AN ORGANIZED HEALTH CARE EDUCATION/TRAINING PROGRAM

## 2023-10-29 PROCEDURE — 700102 HCHG RX REV CODE 250 W/ 637 OVERRIDE(OP): Mod: UD | Performed by: INTERNAL MEDICINE

## 2023-10-29 PROCEDURE — 85027 COMPLETE CBC AUTOMATED: CPT

## 2023-10-29 PROCEDURE — G0378 HOSPITAL OBSERVATION PER HR: HCPCS

## 2023-10-29 PROCEDURE — 82962 GLUCOSE BLOOD TEST: CPT

## 2023-10-29 PROCEDURE — 36415 COLL VENOUS BLD VENIPUNCTURE: CPT

## 2023-10-29 PROCEDURE — 80048 BASIC METABOLIC PNL TOTAL CA: CPT

## 2023-10-29 PROCEDURE — A9270 NON-COVERED ITEM OR SERVICE: HCPCS | Mod: UD | Performed by: STUDENT IN AN ORGANIZED HEALTH CARE EDUCATION/TRAINING PROGRAM

## 2023-10-29 RX ADMIN — INSULIN GLARGINE-YFGN 18 UNITS: 100 INJECTION, SOLUTION SUBCUTANEOUS at 18:37

## 2023-10-29 RX ADMIN — INSULIN LISPRO 2 UNITS: 100 INJECTION, SOLUTION INTRAVENOUS; SUBCUTANEOUS at 19:53

## 2023-10-29 RX ADMIN — OXYCODONE HYDROCHLORIDE 5 MG: 5 TABLET ORAL at 08:45

## 2023-10-29 RX ADMIN — INSULIN LISPRO 1 UNITS: 100 INJECTION, SOLUTION INTRAVENOUS; SUBCUTANEOUS at 06:26

## 2023-10-29 RX ADMIN — OXYCODONE HYDROCHLORIDE 10 MG: 10 TABLET ORAL at 16:20

## 2023-10-29 RX ADMIN — CEPHALEXIN 500 MG: 500 CAPSULE ORAL at 12:04

## 2023-10-29 RX ADMIN — LEVOTHYROXINE SODIUM 250 MCG: 0.12 TABLET ORAL at 05:16

## 2023-10-29 RX ADMIN — CEPHALEXIN 500 MG: 500 CAPSULE ORAL at 19:44

## 2023-10-29 RX ADMIN — SERTRALINE 200 MG: 100 TABLET, FILM COATED ORAL at 06:26

## 2023-10-29 RX ADMIN — DIVALPROEX SODIUM 1500 MG: 500 TABLET, DELAYED RELEASE ORAL at 18:08

## 2023-10-29 RX ADMIN — DIVALPROEX SODIUM 500 MG: 500 TABLET, DELAYED RELEASE ORAL at 05:17

## 2023-10-29 RX ADMIN — CEPHALEXIN 500 MG: 500 CAPSULE ORAL at 05:17

## 2023-10-29 RX ADMIN — ATORVASTATIN CALCIUM 10 MG: 10 TABLET, FILM COATED ORAL at 18:08

## 2023-10-29 RX ADMIN — LURASIDONE HYDROCHLORIDE 80 MG: 40 TABLET, FILM COATED ORAL at 18:08

## 2023-10-29 RX ADMIN — INSULIN LISPRO 2 UNITS: 100 INJECTION, SOLUTION INTRAVENOUS; SUBCUTANEOUS at 12:04

## 2023-10-29 ASSESSMENT — FIBROSIS 4 INDEX: FIB4 SCORE: 0.59

## 2023-10-29 ASSESSMENT — PAIN DESCRIPTION - PAIN TYPE
TYPE: ACUTE PAIN

## 2023-10-29 NOTE — CARE PLAN
"  Problem: Pain - Standard  Goal: Alleviation of pain or a reduction in pain to the patient’s comfort goal  Outcome: Progressing     Problem: Knowledge Deficit - Standard  Goal: Patient and family/care givers will demonstrate understanding of plan of care, disease process/condition, diagnostic tests and medications  Outcome: Progressing     Problem: Skin Integrity  Goal: Skin integrity is maintained or improved  Outcome: Progressing     Problem: Depression  Goal: Patient and family/caregiver will verbalize accurate information about at least two of the possible causes of depression, three-four of the signs and symptoms of depression  Outcome: Not Progressing     The patient is Stable - Low risk of patient condition declining or worsening    Shift Goals  Clinical Goals: Monitor patient's condition.  Patient Goals: Rest.    Progress made toward(s) clinical / shift goals:  yes    Patient is not progressing towards the following goals: Patient is lethargic. Also, he appears to be exaggerating his symptoms. He was caught moving his \"flaccid\" right arm tonight a couple times.      Problem: Depression  Goal: Patient and family/caregiver will verbalize accurate information about at least two of the possible causes of depression, three-four of the signs and symptoms of depression  Outcome: Not Progressing     "

## 2023-10-29 NOTE — PROGRESS NOTES
Hospital Medicine Daily Progress Note    Date of Service  10/29/2023    Chief Complaint  Right-sided numbness and tingling    Hospital Course  Norm Prabhakar is a 43 y.o. male with asthma, bipolar disorder, poorly controlled diabetes, hypothyroidism, seizure disorder vs PNES, and history of hemiplegic migraine, admitted 10/27/2023 with headache and right-sided weakness/numbness.  On evaluation WBC was 9100, sodium 131, glucose 366, creatinine 1.13.  LFTs were unremarkable.  Troponin was 40.  Chest x-ray showed nothing acute.  CT of the head and neck did not show any large vessel occlusion or hemodynamically significant stenosis.  Perfusion head CT was negative.  Neurology was consulted who suspected that symptoms were functional or migrainous in etiology.  Patient was given migraine cocktail but symptoms were slow to improve and thus neurology recommended brain MRI and further observation.    Interval Problem Update  10/29/2023 - I reviewed the patient's chart. There were no significant overnight events. Remains hemodynamically stable and afebrile. Stable on RA.  ESR and CRP were low.  .  He was noted to have wound on the left foot, and x-ray was done which showed no acute osseous abnormality.  He was started on antibiotics.  Brain MRI showed unchanged severe supratentorial leukoencephalopathy with no acute findings.    > I have personally seen and examined the patient today.  He states he is still very weak.  Per nursing still requires assistance with mobility.  Denies any pain.  No nausea, vomiting, chest pain or shortness of breath.    I personally reviewed all lab results mentioned above. Prior medical records from this institution and outside facilities were independently reviewed as noted. I also personally reviewed all ER physician and consultant recommendations and plans as documented above. History was independently obtained by myself. I have discussed this patient's plan of care and discharge plan  at IDT rounds today with Case Management, Nursing, Nursing leadership, and other members of the IDT team.    Consultants/Specialty  neurology    Code Status  Full Code    Disposition  The patient is not medically cleared for discharge to home or a post-acute facility.      PT/OT recommends postacute placement.    I have placed the appropriate orders for post-discharge needs.    Review of Systems  ROS     Pertinent positives/negatives as mentioned above.     A complete review of systems was personally done by me. All other systems were negative.       Physical Exam  Temp:  [36.6 °C (97.9 °F)-37.1 °C (98.7 °F)] 36.6 °C (97.9 °F)  Pulse:  [58-67] 58  Resp:  [14-16] 16  BP: ()/(57-69) 109/66  SpO2:  [90 %-93 %] 90 %    Physical Exam  Vitals reviewed.   Constitutional:       General: He is not in acute distress.     Appearance: Normal appearance. He is not toxic-appearing or diaphoretic.   HENT:      Head: Normocephalic and atraumatic.      Right Ear: External ear normal.      Left Ear: External ear normal.      Mouth/Throat:      Mouth: Mucous membranes are moist.      Pharynx: No oropharyngeal exudate.   Eyes:      General: No scleral icterus.     Extraocular Movements: Extraocular movements intact.      Conjunctiva/sclera: Conjunctivae normal.      Pupils: Pupils are equal, round, and reactive to light.   Cardiovascular:      Rate and Rhythm: Normal rate and regular rhythm.      Heart sounds: Normal heart sounds. No murmur heard.     No gallop.   Pulmonary:      Effort: Pulmonary effort is normal. No respiratory distress.      Breath sounds: Normal breath sounds. No stridor. No wheezing, rhonchi or rales.   Chest:      Chest wall: No tenderness.   Abdominal:      General: Bowel sounds are normal. There is no distension.      Palpations: Abdomen is soft. There is no mass.      Tenderness: There is no abdominal tenderness. There is no guarding or rebound.   Musculoskeletal:         General: No swelling. Normal  range of motion.      Cervical back: Normal range of motion and neck supple.      Right lower leg: No edema.      Left lower leg: No edema.   Lymphadenopathy:      Cervical: No cervical adenopathy.   Skin:     General: Skin is warm and dry.      Coloration: Skin is not jaundiced.      Findings: No rash.   Neurological:      General: No focal deficit present.      Mental Status: He is alert and oriented to person, place, and time.      Cranial Nerves: No cranial nerve deficit.      Comments: Stuttered speech   Psychiatric:         Mood and Affect: Mood normal.         Behavior: Behavior normal.         Thought Content: Thought content normal.         Judgment: Judgment normal.         Fluids    Intake/Output Summary (Last 24 hours) at 10/29/2023 1241  Last data filed at 10/28/2023 2200  Gross per 24 hour   Intake --   Output 300 ml   Net -300 ml       Laboratory  Recent Labs     10/27/23  1420 10/29/23  0641   WBC 9.1 10.0   RBC 3.93* 4.18*   HEMOGLOBIN 12.5* 12.8*   HEMATOCRIT 36.1* 40.6*   MCV 91.9 97.1   MCH 31.8 30.6   MCHC 34.6 31.5*   RDW 42.5 46.6   PLATELETCT 324 362   MPV 10.3 10.4     Recent Labs     10/27/23  1420 10/29/23  0641   SODIUM 131* 134*   POTASSIUM 4.5 4.3   CHLORIDE 92* 96   CO2 25 24   GLUCOSE 366* 183*   BUN 17 28*   CREATININE 1.13 1.22   CALCIUM 9.2 9.0     Recent Labs     10/27/23  1420   APTT 33.8   INR 1.09         Recent Labs     10/27/23  1420   TRIGLYCERIDE 1123*   HDL 31*   LDL see below       Imaging  DX-FOOT-2- LEFT   Final Result         No acute osseous abnormality.      MR-BRAIN-W/O   Final Result      1.  Unchanged severe supratentorial leukoencephalopathy   2.  No acute findings      DX-CHEST-PORTABLE (1 VIEW)   Final Result      No acute cardiopulmonary abnormality.         CT-CTA NECK WITH & W/O-POST PROCESSING   Final Result      1.  No evidence of carotid artery stenosis or occlusion.   2.  Vertebral basilar system intact.   3.  Heterogeneously enhancing mass in the  anterior midline of the neck again seen similar to prior studies.      CT-CTA HEAD WITH & W/O-POST PROCESS   Final Result      1.  There is no large vessel occlusion.   2.  Diffuse low-attenuation of the bilateral white matter.      CT-CEREBRAL PERFUSION ANALYSIS   Final Result      1.  Cerebral blood flow less than 30% likely representing completed infarct = 0 mL.      2.  T Max more than 6 seconds likely representing combination of completed infarct and ischemia = 0 mL.      3.  Mismatched volume likely representing ischemic brain/penumbra = None      4.  Please note that the cerebral perfusion was performed on the limited brain tissue around the basal ganglia region. Infarct/ischemia outside the CT perfusion sections can be missed in this study.      CT-HEAD W/O   Final Result      1.  No acute intracranial hemorrhage or evidence of territorial infarct.   2.  Diffuse bilateral hypodensity throughout the white matter unchanged compared with multiple prior studies including MRI and numerous prior CTs.              Assessment/Plan  * Right sided weakness- (present on admission)  Assessment & Plan  -With history of hemiplegic migraines.  Similar to prior presentations.  Seen by neurology and felt to be another episode of hemiplegic migraine. Was slow to improve with migraine cocktail so further observation.  Brain MRI showed unchanged severe supratentorial leukoencephalopathy with no acute findings.  -Suspect functional component.  -Continue statin.  -PT/OT recommending postacute placement, but may be difficult as he is observation and also has Medicaid FFS.    Seizure disorder (HCC)- (present on admission)  Assessment & Plan  - No active seizures.  Continue divalproex.  Maintain on seizure precautions.    Type 2 diabetes mellitus without complication, without long-term current use of insulin (HCC)- (present on admission)  Assessment & Plan  - Continue Lantus 18 units at bedtime along with sliding scale insulin  coverage. Accu-Cheks before meals and at bedtime. Goal to keep BG between 140-180 per 2019 ADA guidelines.  Continue diabetic diet.         VTE prophylaxis: SCD      My total time spent caring for the patient on the day of the encounter was 37 minutes. This does not include time spent on separately billable procedures/tests.

## 2023-10-30 ENCOUNTER — TELEPHONE (OUTPATIENT)
Dept: HEALTH INFORMATION MANAGEMENT | Facility: OTHER | Age: 43
End: 2023-10-30

## 2023-10-30 LAB
GLUCOSE BLD STRIP.AUTO-MCNC: 157 MG/DL (ref 65–99)
GLUCOSE BLD STRIP.AUTO-MCNC: 164 MG/DL (ref 65–99)
GLUCOSE BLD STRIP.AUTO-MCNC: 169 MG/DL (ref 65–99)
GLUCOSE BLD STRIP.AUTO-MCNC: 194 MG/DL (ref 65–99)

## 2023-10-30 PROCEDURE — G0378 HOSPITAL OBSERVATION PER HR: HCPCS

## 2023-10-30 PROCEDURE — RXMED WILLOW AMBULATORY MEDICATION CHARGE: Performed by: INTERNAL MEDICINE

## 2023-10-30 PROCEDURE — A9270 NON-COVERED ITEM OR SERVICE: HCPCS | Mod: UD | Performed by: STUDENT IN AN ORGANIZED HEALTH CARE EDUCATION/TRAINING PROGRAM

## 2023-10-30 PROCEDURE — 82962 GLUCOSE BLOOD TEST: CPT

## 2023-10-30 PROCEDURE — 700102 HCHG RX REV CODE 250 W/ 637 OVERRIDE(OP): Mod: UD | Performed by: INTERNAL MEDICINE

## 2023-10-30 PROCEDURE — 97530 THERAPEUTIC ACTIVITIES: CPT

## 2023-10-30 PROCEDURE — A9270 NON-COVERED ITEM OR SERVICE: HCPCS | Mod: UD | Performed by: INTERNAL MEDICINE

## 2023-10-30 PROCEDURE — 700102 HCHG RX REV CODE 250 W/ 637 OVERRIDE(OP): Mod: UD | Performed by: STUDENT IN AN ORGANIZED HEALTH CARE EDUCATION/TRAINING PROGRAM

## 2023-10-30 PROCEDURE — 99232 SBSQ HOSP IP/OBS MODERATE 35: CPT | Performed by: INTERNAL MEDICINE

## 2023-10-30 RX ORDER — CEPHALEXIN 500 MG/1
500 CAPSULE ORAL EVERY 6 HOURS
Qty: 28 CAPSULE | Refills: 0 | Status: ACTIVE | OUTPATIENT
Start: 2023-10-30 | End: 2023-11-07

## 2023-10-30 RX ADMIN — INSULIN GLARGINE-YFGN 18 UNITS: 100 INJECTION, SOLUTION SUBCUTANEOUS at 18:07

## 2023-10-30 RX ADMIN — LURASIDONE HYDROCHLORIDE 80 MG: 40 TABLET, FILM COATED ORAL at 18:39

## 2023-10-30 RX ADMIN — SERTRALINE 200 MG: 100 TABLET, FILM COATED ORAL at 04:19

## 2023-10-30 RX ADMIN — CEPHALEXIN 500 MG: 500 CAPSULE ORAL at 17:40

## 2023-10-30 RX ADMIN — CEPHALEXIN 500 MG: 500 CAPSULE ORAL at 21:09

## 2023-10-30 RX ADMIN — INSULIN LISPRO 1 UNITS: 100 INJECTION, SOLUTION INTRAVENOUS; SUBCUTANEOUS at 09:28

## 2023-10-30 RX ADMIN — LEVOTHYROXINE SODIUM 250 MCG: 0.12 TABLET ORAL at 04:19

## 2023-10-30 RX ADMIN — CEPHALEXIN 500 MG: 500 CAPSULE ORAL at 09:32

## 2023-10-30 RX ADMIN — DIVALPROEX SODIUM 500 MG: 500 TABLET, DELAYED RELEASE ORAL at 04:19

## 2023-10-30 RX ADMIN — INSULIN LISPRO 1 UNITS: 100 INJECTION, SOLUTION INTRAVENOUS; SUBCUTANEOUS at 18:06

## 2023-10-30 RX ADMIN — CEPHALEXIN 500 MG: 500 CAPSULE ORAL at 03:45

## 2023-10-30 RX ADMIN — INSULIN LISPRO 1 UNITS: 100 INJECTION, SOLUTION INTRAVENOUS; SUBCUTANEOUS at 21:13

## 2023-10-30 RX ADMIN — ATORVASTATIN CALCIUM 10 MG: 10 TABLET, FILM COATED ORAL at 17:40

## 2023-10-30 RX ADMIN — INSULIN LISPRO 1 UNITS: 100 INJECTION, SOLUTION INTRAVENOUS; SUBCUTANEOUS at 14:04

## 2023-10-30 RX ADMIN — DIVALPROEX SODIUM 1500 MG: 500 TABLET, DELAYED RELEASE ORAL at 17:41

## 2023-10-30 SDOH — ECONOMIC STABILITY: FOOD INSECURITY: WITHIN THE PAST 12 MONTHS, YOU WORRIED THAT YOUR FOOD WOULD RUN OUT BEFORE YOU GOT MONEY TO BUY MORE.: SOMETIMES TRUE

## 2023-10-30 SDOH — ECONOMIC STABILITY: INCOME INSECURITY: IN THE LAST 12 MONTHS, WAS THERE A TIME WHEN YOU WERE NOT ABLE TO PAY THE MORTGAGE OR RENT ON TIME?: NO

## 2023-10-30 SDOH — ECONOMIC STABILITY: INCOME INSECURITY: HOW HARD IS IT FOR YOU TO PAY FOR THE VERY BASICS LIKE FOOD, HOUSING, MEDICAL CARE, AND HEATING?: NOT VERY HARD

## 2023-10-30 SDOH — ECONOMIC STABILITY: HOUSING INSECURITY: IN THE LAST 12 MONTHS, HOW MANY PLACES HAVE YOU LIVED?: 1

## 2023-10-30 SDOH — ECONOMIC STABILITY: HOUSING INSECURITY
IN THE LAST 12 MONTHS, WAS THERE A TIME WHEN YOU DID NOT HAVE A STEADY PLACE TO SLEEP OR SLEPT IN A SHELTER (INCLUDING NOW)?: NO

## 2023-10-30 SDOH — ECONOMIC STABILITY: FOOD INSECURITY: WITHIN THE PAST 12 MONTHS, THE FOOD YOU BOUGHT JUST DIDN'T LAST AND YOU DIDN'T HAVE MONEY TO GET MORE.: NEVER TRUE

## 2023-10-30 SDOH — ECONOMIC STABILITY: TRANSPORTATION INSECURITY
IN THE PAST 12 MONTHS, HAS LACK OF TRANSPORTATION KEPT YOU FROM MEETINGS, WORK, OR FROM GETTING THINGS NEEDED FOR DAILY LIVING?: YES

## 2023-10-30 ASSESSMENT — COGNITIVE AND FUNCTIONAL STATUS - GENERAL
SUGGESTED CMS G CODE MODIFIER MOBILITY: CK
TURNING FROM BACK TO SIDE WHILE IN FLAT BAD: A LOT
STANDING UP FROM CHAIR USING ARMS: A LOT
WALKING IN HOSPITAL ROOM: A LOT
MOBILITY SCORE: 16
CLIMB 3 TO 5 STEPS WITH RAILING: A LOT

## 2023-10-30 ASSESSMENT — GAIT ASSESSMENTS
GAIT LEVEL OF ASSIST: MINIMAL ASSIST
DISTANCE (FEET): 2
DEVIATION: BRADYKINETIC;SHUFFLED GAIT
ASSISTIVE DEVICE: FRONT WHEEL WALKER

## 2023-10-30 NOTE — DISCHARGE SUMMARY
Discharge Summary    CHIEF COMPLAINT ON ADMISSION  Chief Complaint   Patient presents with    Possible Stroke     Patient was walking himself to [facility] for his ongoing depression, and he reports he had a sudden onset of RUE/RLE numbness and tingling at 1130am. Denies blood thinners. NIHSS 10 on arrival to hospital. Stroke Alert called by ERP.        Reason for Admission  EMS      Admission Date  10/27/2023    CODE STATUS  Full Code    HPI & HOSPITAL COURSE  43-year-old male past medical history of cholecystitis, asthma, poorly controlled diabetes hypothyroidism, seizure disorder, history of right hemiplegia and chronic migraines presented 10/27/2023 with headache and right-sided weakness, numbness.  On arrival WBC was 9100, glucose 363, creatinine 1.93, LFTs unremarkable.  Chest x-ray no acute findings.  CT of the head and neck no large vessel occlusion.  Perfusion head CT was negative.  Neurology was consulted and symptoms were considered functional migrainous status.  Patient was given migraine cocktail and his symptoms did improve  MRI brain showing unchanged severe supratentorial leukoencephalopathy  PT OT recommending further therapies however that is not possible due to lack of insurance. Pt will be transfer to [facility]     In regards to third left toe, slight erythematous. Pt believed he smashed it. He is started on keflex. XR negative for osteomyelitis     Therefore, he is discharged in fair and stable condition to home with close outpatient follow-up.    The patient met 2-midnight criteria for an inpatient stay at the time of discharge.    Discharge Date  10/30/2023    FOLLOW UP ITEMS POST DISCHARGE  none    DISCHARGE DIAGNOSES  Principal Problem:    Right sided weakness (POA: Yes)  Active Problems:    Type 2 diabetes mellitus without complication, without long-term current use of insulin (HCC) (POA: Yes)    Seizure disorder (HCC) (POA: Yes)  Resolved Problems:    * No resolved hospital problems.  *      FOLLOW UP  No future appointments.  No follow-up provider specified.    MEDICATIONS ON DISCHARGE     Medication List        START taking these medications        Instructions   cephALEXin 500 MG Caps  Commonly known as: Keflex   Take 1 Capsule by mouth every 6 hours for 7 days.  Dose: 500 mg            CONTINUE taking these medications        Instructions   atorvastatin 10 MG Tabs  Commonly known as: Lipitor   Take 1 Tablet by mouth every evening.  Dose: 10 mg     BD Pen Needle Olesya U/F  Generic drug: Insulin Pen Needle 32 G x 4 mm   Doctor's comments: Per patient/formulary preference. ICD-10 code: E11.65 Uncontrolled type 2 Diabetes Mellitus  Use one pen needle in pen device to inject insulin five times daily.     divalproex 500 MG Tbec  Commonly known as: Depakote   Take 500-1,500 mg by mouth 2 times a day. 500 mg in AM and 1500 mg at night  Dose: 500-1,500 mg     Lantus SoloStar 100 UNIT/ML Sopn injection  Generic drug: insulin glargine   Inject 34 Units under the skin 2 times a day.  Dose: 34 Units     Latuda 80 MG Tabs  Generic drug: lurasidone   Take 80 mg by mouth with dinner.  Dose: 80 mg     * levothyroxine 200 MCG Tabs  Commonly known as: Synthroid   Take 200 mcg by mouth every morning on an empty stomach. Total of 225 mcg once a day  Dose: 200 mcg     * levothyroxine 25 MCG Tabs  Commonly known as: Synthroid   Take 25 mcg by mouth every morning on an empty stomach. Total of 225 mcg once a day  Dose: 25 mcg     loratadine 10 MG Tabs  Commonly known as: Claritin   Take 10 mg by mouth every day.  Dose: 10 mg     sertraline 100 MG Tabs  Commonly known as: Zoloft   Take 200 mg by mouth every day.  Dose: 200 mg     traZODone 50 MG Tabs  Commonly known as: Desyrel   Take 50 mg by mouth 1 time a day as needed. Indications: Trouble Sleeping  Dose: 50 mg     vitamin D 2000 UNIT Tabs   Take 4,000 Units by mouth every day.  Dose: 4,000 Units           * This list has 2 medication(s) that are the same as other  "medications prescribed for you. Read the directions carefully, and ask your doctor or other care provider to review them with you.                  Allergies  Allergies   Allergen Reactions    Abilify      \"Feeling tired, like I don't even know whats going on around me\"    Fish      Pt reports salmon causes him to be sick to his stomach  Not listed on MAR noted 2/3/2021      Geodon [Ziprasidone Hcl] Anaphylaxis     Anaphylaxis per patient    Aripiprazole      \"I became lethargic.\"    Hydroxyzine Itching     Pt will only state \"I feel itchy when I take it    Ziprasidone        DIET  Orders Placed This Encounter   Procedures    Diet Order Diet: Consistent CHO (Diabetic)     Standing Status:   Standing     Number of Occurrences:   1     Order Specific Question:   Diet:     Answer:   Consistent CHO (Diabetic) [4]       ACTIVITY  As tolerated.  Weight bearing as tolerated    CONSULTATIONS  Neurology     PROCEDURES  none    LABORATORY  Lab Results   Component Value Date    SODIUM 134 (L) 10/29/2023    POTASSIUM 4.3 10/29/2023    CHLORIDE 96 10/29/2023    CO2 24 10/29/2023    GLUCOSE 183 (H) 10/29/2023    BUN 28 (H) 10/29/2023    CREATININE 1.22 10/29/2023    GLOMRATE 89 05/18/2023        Lab Results   Component Value Date    WBC 10.0 10/29/2023    HEMOGLOBIN 12.8 (L) 10/29/2023    HEMATOCRIT 40.6 (L) 10/29/2023    PLATELETCT 362 10/29/2023        Total time of the discharge process exceeds 35 minutes.  "

## 2023-10-30 NOTE — DISCHARGE PLANNING
Case Management Discharge Planning    Admission Date: 10/27/2023  GMLOS:    ALOS: 0    6-Clicks ADL Score: 12  6-Clicks Mobility Score: 9  PT and/or OT Eval ordered: Yes  Post-acute Referrals Ordered: No  Post-acute Choice Obtained: No  Has referral(s) been sent to post-acute provider:  No      Anticipated Discharge Dispo: Discharge Disposition: Discharged to home/self care (01)  Discharge Address: [address] [place] [id number]    DME Needed: No    Action(s) Taken:  1530 RN CM received a call from Tamara from the [facility] stating that they are full for today.  She recommends for patients to get there between 0530-6 am for an open bed. MD Crenshaw notified.  Community health worker Aida will provide community resources per RN CM's request. RN CM to assist with transportation.    1540 DC held for tomorrow in am.  RN CM to call [facility] first thing in am.     Escalations Completed: None    Medically Clear: Yes    Next Steps: RNCM to continue to follow up with pt and medical team to address dc needs and barriers      Barriers to Discharge: Homelessness

## 2023-10-30 NOTE — PROGRESS NOTES
at [facility]: Zach Chavez #[phone]. Would like call if and when patient discharged.

## 2023-10-30 NOTE — THERAPY
"Physical Therapy   Daily Treatment     Patient Name: Norm Prabhakar  Age:  43 y.o., Sex:  male  Medical Record #: [id number]  Today's Date: 10/30/2023     Precautions  Precautions: Fall Risk    Assessment    Patient seen for follow up PT session and demos continued R-sided weakness. However, is able to stand w/FWW and use his R UE with some assist from his L UE.  He remains dysarthric, unclear if this is baseline.  CM notes indicate he lives at [facility], however patient says he lives alone in an apt.  Query possible conversion disorder, as patient has a past medical history of this.  Will continue to follow while in house.     Plan    Treatment Plan Status: Continue Current Treatment Plan  Type of Treatment: Bed Mobility, Gait Training, Neuro Re-Education / Balance, Therapeutic Activities, Therapeutic Exercise, Equipment, Stair Training, Self Care / Home Evaluation  Treatment Frequency: 4 Times per Week  Treatment Duration: Until Therapy Goals Met    DC Equipment Recommendations: Unable to determine at this time  Discharge Recommendations: Recommend post-acute placement for additional physical therapy services prior to discharge home      Subjective    \"I didn't like the beans they gave me for lunch\"     Objective       10/30/23 1535   Precautions   Precautions Fall Risk   Vitals   Pulse (!) 55   Patient BP Position Supine   Blood Pressure 108/70   Respiration 17   Pulse Oximetry 95 %   O2 (LPM) 0   O2 Delivery Device None - Room Air   Pain 0 - 10 Group   Location Head   Location Orientation Anterior   Pain Rating Scale (NPRS) 8   Cognition    Cognition / Consciousness X   Speech/ Communication Dysarthric;Delayed Responses   Level of Consciousness Alert   Attention Impaired   Comments pleasant and cooperative   Strength Lower Body   Lower Body Strength    (R LE with sufficient strength to complete STS, however, 1/5 with MMT)   Sensation Lower Body   Comments reports decreased sensation in R foot   Neuro-Muscular " Treatments   Neuro-Muscular Treatments Anterior weight shift;Postural Facilitation;Postural Changes;Weight Shift Right;Weight Shift Left   Vision   Vision Comments L eye blindness   Other Treatments   Other Treatments Provided educated patient about the pathologies of bed rest   Balance   Sitting Balance (Static) Fair   Sitting Balance (Dynamic) Fair   Standing Balance (Static) Fair -   Standing Balance (Dynamic) Fair -   Weight Shift Sitting Fair   Weight Shift Standing Fair   Skilled Intervention Compensatory Strategies;Postural Facilitation;Sequencing;Verbal Cuing   Bed Mobility    Supine to Sit Supervised   Sit to Supine Minimal Assist   Scooting Supervised   Skilled Intervention Compensatory Strategies;Sequencing;Tactile Cuing;Verbal Cuing   Comments using L LE to assist R LE to EOB   Gait Analysis   Gait Level Of Assist Minimal Assist   Assistive Device Front Wheel Walker   Distance (Feet) 2   Deviation Bradykinetic;Shuffled Gait   Comments side steps towards HOB   Functional Mobility   Sit to Stand Contact Guard Assist   Bed, Chair, Wheelchair Transfer Unable to Participate   Comments reports being too shaky to get to a chair   How much difficulty does the patient currently have...   Turning over in bed (including adjusting bedclothes, sheets and blankets)? 2   Sitting down on and standing up from a chair with arms (e.g., wheelchair, bedside commode, etc.) 4   Moving from lying on back to sitting on the side of the bed? 4   How much help from another person does the patient currently need...   Moving to and from a bed to a chair (including a wheelchair)? 2   Need to walk in a hospital room? 2   Climbing 3-5 steps with a railing? 2   6 clicks Mobility Score 16   Activity Tolerance   Sitting Edge of Bed 10 min   Standing 2 min   Short Term Goals    Short Term Goal # 1 pt able to perform supine to from sit w/o bed features with SPV in 6 visits   Goal Outcome # 1 Progressing as expected   Short Term Goal # 2 pt  will perform sit to stand w/ LRAD and SPV to improve mobility independence in 6 visits   Goal Outcome # 2 Progressing as expected   Short Term Goal # 3 pt will ambulate >200 ft w/ LRAD and SPV to access community in 6 visits   Goal Outcome # 3 Progressing slower than expected   Physical Therapy Treatment Plan   Physical Therapy Treatment Plan Continue Current Treatment Plan   Anticipated Discharge Equipment and Recommendations   DC Equipment Recommendations Unable to determine at this time   Discharge Recommendations Recommend post-acute placement for additional physical therapy services prior to discharge home     Hanh North, PT, DPT, GCS

## 2023-10-30 NOTE — DISCHARGE PLANNING
RN Case Manager Aleida asked MIGUEL Parra to met with pt bedside and provide any needed resources.   MIGUEL Parra met with pt bedside to offer Community Care Management services.   Housing: Pt states he is currently living in a studio apartment by himself.   Transportation: Pt reports he takes the bus. MIGUEL Parra provided [employer] contact information and encouraged pt to call them for transportation to and from his appointments.   Food: Pt states he receives SNAP benefits as well as goes to local pantries. MIGUEL Parra provided [employer] information. Pt states he can benefit from food deliveries, CHW notified pt that with SNAP he is able to get groceries delivered via [employer] delivery services.   Finances: Pt reports to be receiving SSI as well as help from [facility].   PCP Follow up Appointment: Pt states PCP is [facility]. CHW will help schedule appointment post discharge.   MIGUEL Parra provided CCM contact information and encouraged pt to call if anything was needed. CHW will contact pt post discharge to offer any other resources and follow up on provided resources.       Community Health Worker Intake    Identified barriers to food, and transportation.  Resources provided to [employer], [employer], [employer] food delivery.  Contact information provided to Norm Prabhakar.  Has PCP appointment scheduled for: TBA  Inpatient assessment completed.  Did the patient receive medications post discharge:     Plan:  MIGUEL Parra will contact pt post discharge. Pt provided new contact number, pt chart was updated.

## 2023-10-30 NOTE — CARE PLAN
The patient is Stable - Low risk of patient condition declining or worsening    Shift Goals  Clinical Goals: pain mgt, safety, rest and comfort  Patient Goals: rest, sleeps comfortably  Family Goals: not present    Progress made toward(s) clinical / shift goals:    Problem: Pain - Standard  Goal: Alleviation of pain or a reduction in pain to the patient’s comfort goal  Outcome: Progressing     Problem: Knowledge Deficit - Standard  Goal: Patient and family/care givers will demonstrate understanding of plan of care, disease process/condition, diagnostic tests and medications  Outcome: Progressing     Problem: Skin Integrity  Goal: Skin integrity is maintained or improved  Outcome: Progressing     Problem: Depression  Goal: Patient and family/caregiver will verbalize accurate information about at least two of the possible causes of depression, three-four of the signs and symptoms of depression  Outcome: Progressing     Patient is alert and oriented x 4. Slurred speech with some delayed response. No complaint of pain. Tolerated his dinner and consumed 100%. IV site patent and saline locked. Right side weakness with right facial drooping. Not new episode. Fall precautions in placed. Frequent roundings, Call light and personal items within reached. FSBG is 222 and insulin coverage given. DM education done. No other concerns at this time. Kept safe and continue monitoring.   Patient is not progressing towards the following goals:

## 2023-10-30 NOTE — CARE PLAN
The patient is Stable - Low risk of patient condition declining or worsening    Shift Goals  Clinical Goals: pain management, neuro checks, bed bath/linen change  Patient Goals: comfort  Family Goals: not present    Progress made toward(s) clinical / shift goals:  Patient denied pain. No changes in q4 neuro checks. Patient given bed bath and full linen change by RN and student RN.      Problem: Pain - Standard  Goal: Alleviation of pain or a reduction in pain to the patient’s comfort goal  Outcome: Progressing     Problem: Knowledge Deficit - Standard  Goal: Patient and family/care givers will demonstrate understanding of plan of care, disease process/condition, diagnostic tests and medications  Outcome: Progressing     Problem: Skin Integrity  Goal: Skin integrity is maintained or improved  Outcome: Progressing     Problem: Depression  Goal: Patient and family/caregiver will verbalize accurate information about at least two of the possible causes of depression, three-four of the signs and symptoms of depression  Outcome: Progressing       Patient is not progressing towards the following goals:

## 2023-10-30 NOTE — PROGRESS NOTES
Patient is alert and oriented x 4. Slurred speech with some delayed response. No complaint of pain. Tolerated his dinner and consumed 100%. IV site patent and saline locked. Right side weakness with right facial drooping. Not new episode. Fall precautions in placed. Frequent roundings, Call light and personal items within reached. FSBG is 222 and insulin coverage given. DM education done. No other concerns at this time. Kept safe and continue monitoring.

## 2023-10-31 ENCOUNTER — PHARMACY VISIT (OUTPATIENT)
Dept: PHARMACY | Facility: MEDICAL CENTER | Age: 43
End: 2023-10-31
Payer: COMMERCIAL

## 2023-10-31 ENCOUNTER — PATIENT OUTREACH (OUTPATIENT)
Dept: SCHEDULING | Facility: IMAGING CENTER | Age: 43
End: 2023-10-31
Payer: MEDICAID

## 2023-10-31 VITALS
BODY MASS INDEX: 31.25 KG/M2 | RESPIRATION RATE: 17 BRPM | TEMPERATURE: 98 F | DIASTOLIC BLOOD PRESSURE: 67 MMHG | SYSTOLIC BLOOD PRESSURE: 99 MMHG | HEIGHT: 78 IN | OXYGEN SATURATION: 96 % | WEIGHT: 270.06 LBS | HEART RATE: 52 BPM

## 2023-10-31 LAB — GLUCOSE BLD STRIP.AUTO-MCNC: 129 MG/DL (ref 65–99)

## 2023-10-31 PROCEDURE — 700111 HCHG RX REV CODE 636 W/ 250 OVERRIDE (IP): Performed by: INTERNAL MEDICINE

## 2023-10-31 PROCEDURE — 90686 IIV4 VACC NO PRSV 0.5 ML IM: CPT | Performed by: INTERNAL MEDICINE

## 2023-10-31 PROCEDURE — 82962 GLUCOSE BLOOD TEST: CPT

## 2023-10-31 PROCEDURE — A9270 NON-COVERED ITEM OR SERVICE: HCPCS | Mod: UD | Performed by: INTERNAL MEDICINE

## 2023-10-31 PROCEDURE — G0378 HOSPITAL OBSERVATION PER HR: HCPCS

## 2023-10-31 PROCEDURE — 700102 HCHG RX REV CODE 250 W/ 637 OVERRIDE(OP): Mod: UD | Performed by: STUDENT IN AN ORGANIZED HEALTH CARE EDUCATION/TRAINING PROGRAM

## 2023-10-31 PROCEDURE — 90471 IMMUNIZATION ADMIN: CPT

## 2023-10-31 PROCEDURE — A9270 NON-COVERED ITEM OR SERVICE: HCPCS | Mod: UD | Performed by: STUDENT IN AN ORGANIZED HEALTH CARE EDUCATION/TRAINING PROGRAM

## 2023-10-31 PROCEDURE — 99239 HOSP IP/OBS DSCHRG MGMT >30: CPT | Performed by: INTERNAL MEDICINE

## 2023-10-31 PROCEDURE — 700102 HCHG RX REV CODE 250 W/ 637 OVERRIDE(OP): Mod: UD | Performed by: INTERNAL MEDICINE

## 2023-10-31 RX ADMIN — INFLUENZA A VIRUS A/VICTORIA/4897/2022 IVR-238 (H1N1) ANTIGEN (FORMALDEHYDE INACTIVATED), INFLUENZA A VIRUS A/DARWIN/9/2021 SAN-010 (H3N2) ANTIGEN (FORMALDEHYDE INACTIVATED), INFLUENZA B VIRUS B/PHUKET/3073/2013 ANTIGEN (FORMALDEHYDE INACTIVATED), AND INFLUENZA B VIRUS B/MICHIGAN/01/2021 ANTIGEN (FORMALDEHYDE INACTIVATED) 0.5 ML: 15; 15; 15; 15 INJECTION, SUSPENSION INTRAMUSCULAR at 10:31

## 2023-10-31 RX ADMIN — LEVOTHYROXINE SODIUM 250 MCG: 0.12 TABLET ORAL at 05:49

## 2023-10-31 RX ADMIN — CEPHALEXIN 500 MG: 500 CAPSULE ORAL at 09:36

## 2023-10-31 RX ADMIN — CEPHALEXIN 500 MG: 500 CAPSULE ORAL at 05:51

## 2023-10-31 RX ADMIN — SERTRALINE 200 MG: 100 TABLET, FILM COATED ORAL at 05:49

## 2023-10-31 RX ADMIN — DIVALPROEX SODIUM 500 MG: 500 TABLET, DELAYED RELEASE ORAL at 05:49

## 2023-10-31 NOTE — PROGRESS NOTES
Patient being discharged via discharge lounge. Pt educated on discharge instructions and new prescriptions, verbalized understanding. Follow up appointment to be made with PCP. PIV removed. Patient going home via cab with voucher.

## 2023-10-31 NOTE — CARE PLAN
Problem: Pain - Standard  Goal: Alleviation of pain or a reduction in pain to the patient’s comfort goal  Outcome: Progressing     Problem: Knowledge Deficit - Standard  Goal: Patient and family/care givers will demonstrate understanding of plan of care, disease process/condition, diagnostic tests and medications  Outcome: Progressing     Problem: Skin Integrity  Goal: Skin integrity is maintained or improved  Outcome: Progressing     Problem: Depression  Goal: Patient and family/caregiver will verbalize accurate information about at least two of the possible causes of depression, three-four of the signs and symptoms of depression  Outcome: Progressing   The patient is Stable - Low risk of patient condition declining or worsening    Shift Goals  Clinical Goals: Monitor BG, rest  Patient Goals: eat, sleep  Family Goals: not present    Progress made toward(s) clinical / shift goals:  Patient resting in bed, bed is locked and in lowest position, call bell within reach of patient, patient is compliant with care and medications, Q4 NC in effect.    Patient is not progressing towards the following goals:

## 2023-10-31 NOTE — CARE PLAN
OK to start TF per GI w/ goal to get phos closer to 4. Will d/c potassium replacement and give another dose of K phos now. Ok to give meds and cards recs reviewed. reviewed above w/ MD/RN.      [facility]  3:54 PM The patient is Stable - Low risk of patient condition declining or worsening    Shift Goals  Clinical Goals: discharge home  Patient Goals: rest, discharge  Family Goals: not present    Progress made toward(s) clinical / shift goals:  Pt discharged to discharge lounge to be discharged home. No acute distress noted. Pt remains free of falls. Pt leaving with all belongings. Flu shot administered prior to discharge. Ride voucher given to patient.     Patient is not progressing towards the following goals:

## 2023-10-31 NOTE — DISCHARGE INSTRUCTIONS
Discharge Instructions    Discharged to home by car with self. Discharged via walking, hospital escort: Refused.  Special equipment needed: Not Applicable    Be sure to schedule a follow-up appointment with your primary care doctor or any specialists as instructed.     Discharge Plan:   Diet Plan: Discussed  Activity Level: Discussed  Confirmed Follow up Appointment: Patient to Call and Schedule Appointment  Confirmed Symptoms Management: Discussed  Medication Reconciliation Updated: Yes  Influenza Vaccine Indication: Indicated: 9 to 64 years of age    I understand that a diet low in cholesterol, fat, and sodium is recommended for good health. Unless I have been given specific instructions below for another diet, I accept this instruction as my diet prescription.   Other diet: Regular as tolerated    Special Instructions: None    -Is this patient being discharged with medication to prevent blood clots?  No    Is patient discharged on Warfarin / Coumadin?   No

## 2023-10-31 NOTE — DISCHARGE PLANNING
RN CM met with pt to complete assessment.  Pt states he lives in a studio for the past month.  Address updated on face sheet. Pt is independent with ADLS.  NO DME.  He has a walker at the bedside.  at [facility]: Zach Chavez #[phone] notified that pt will be discharged today. [facility] is assisting with housing  and counseling services.  Pt will be scheduled with [facility] for PCP follow up.  Referral sent for outpt physical therapy.  Cab voucher given to bedside RN.       Care Transition Team Assessment    Information Source  Orientation Level: Oriented X4  Information Given By: Patient     Elopement Risk  Legal Hold: No  Ambulatory or Self Mobile in Wheelchair: No-Not an Elopement Risk  Elopement Risk: Not at Risk for Elopement    Interdisciplinary Discharge Planning  Lives with - Patient's Self Care Capacity: Alone and Able to Care For Self  Patient or legal guardian wants to designate a caregiver: No  Support Systems: None  Housing / Facility: 1 Story Apartment / Condo  Prior Services: Home-Independent  Durable Medical Equipment: Not Applicable    Discharge Preparedness  What is your plan after discharge?: Home with help  What are your discharge supports?: Parent  Difficulity with ADLs: None         Vision / Hearing Impairment  Vision Impairment : No  Right Eye Vision: Wears Glasses  Left Eye Vision: Blind  Hearing Impairment : No     Advance Directive  Advance Directive?: None    Domestic Abuse  Have you ever been the victim of abuse or violence?: No  Physical Abuse or Sexual Abuse: No  Verbal Abuse or Emotional Abuse: No  Possible Abuse/Neglect Reported to:: Not Applicable    Psychological Assessment  History of Psychiatric Problems: Yes         Anticipated Discharge Information  Discharge Disposition: Discharged to home/self care (01)  Discharge Address: [address] [place] [address] [address]  [place] [id number]

## 2023-11-02 ENCOUNTER — PATIENT OUTREACH (OUTPATIENT)
Dept: HEALTH INFORMATION MANAGEMENT | Facility: OTHER | Age: 43
End: 2023-11-02
Payer: MEDICAID

## 2023-11-02 NOTE — DISCHARGE SUMMARY
Discharge Summary    CHIEF COMPLAINT ON ADMISSION  Chief Complaint   Patient presents with    Possible Stroke     Patient was walking himself to [facility] for his ongoing depression, and he reports he had a sudden onset of RUE/RLE numbness and tingling at 1130am. Denies blood thinners. NIHSS 10 on arrival to hospital. Stroke Alert called by ERP.        Reason for Admission  EMS      Admission Date  10/27/2023    CODE STATUS  Prior    HPI & HOSPITAL COURSE  43-year-old male past medical history of cholecystitis, asthma, poorly controlled diabetes hypothyroidism, seizure disorder, history of right hemiplegia and chronic migraines presented 10/27/2023 with headache and right-sided weakness, numbness.  On arrival WBC was 9100, glucose 363, creatinine 1.93, LFTs unremarkable.  Chest x-ray no acute findings.  CT of the head and neck no large vessel occlusion.  Perfusion head CT was negative.  Neurology was consulted and symptoms were considered functional migrainous status.  Patient was given migraine cocktail and his symptoms did improve  MRI brain showing unchanged severe supratentorial leukoencephalopathy  PT OT recommending further therapies however that is not possible due to lack of insurance. Pt will be transfer to [facility]     In regards to third left toe, slight erythematous. Pt believed he smashed it. He is started on keflex. XR negative for osteomyelitis     Pt not able to be discharge a night prior because transportation arrangement. No event overnight and pt stable to be discharge 10/31/2023     Therefore, he is discharged in fair and stable condition to home with close outpatient follow-up.    The patient met 2-midnight criteria for an inpatient stay at the time of discharge.    Discharge Date  10/30/2023    FOLLOW UP ITEMS POST DISCHARGE  none    DISCHARGE DIAGNOSES  Principal Problem:    Right sided weakness (POA: Yes)  Active Problems:    Type 2 diabetes mellitus without complication, without long-term  current use of insulin (HCC) (POA: Yes)    Seizure disorder (HCC) (POA: Yes)  Resolved Problems:    * No resolved hospital problems. *      FOLLOW UP  No future appointments.  [facility]  [address] [place] [address]  Marcell Salguero [id number]  [phone]  Go on 11/15/2023  Go to your appointment with Allyson Steiner on Wednesday November 15th 2023 at 3:40 pm    MEDICATIONS ON DISCHARGE     Medication List        START taking these medications        Instructions   cephALEXin 500 MG Caps  Commonly known as: Keflex   Take 1 Capsule by mouth every 6 hours for 7 days.  Dose: 500 mg            CONTINUE taking these medications        Instructions   atorvastatin 10 MG Tabs  Commonly known as: Lipitor   Take 1 Tablet by mouth every evening.  Dose: 10 mg     BD Pen Needle Olesya U/F  Generic drug: Insulin Pen Needle 32 G x 4 mm   Doctor's comments: Per patient/formulary preference. ICD-10 code: E11.65 Uncontrolled type 2 Diabetes Mellitus  Use one pen needle in pen device to inject insulin five times daily.     divalproex 500 MG Tbec  Commonly known as: Depakote   Take 500-1,500 mg by mouth 2 times a day. 500 mg in AM and 1500 mg at night  Dose: 500-1,500 mg     Lantus SoloStar 100 UNIT/ML Sopn injection  Generic drug: insulin glargine   Inject 34 Units under the skin 2 times a day.  Dose: 34 Units     Latuda 80 MG Tabs  Generic drug: lurasidone   Take 80 mg by mouth with dinner.  Dose: 80 mg     * levothyroxine 200 MCG Tabs  Commonly known as: Synthroid   Take 200 mcg by mouth every morning on an empty stomach. Total of 225 mcg once a day  Dose: 200 mcg     * levothyroxine 25 MCG Tabs  Commonly known as: Synthroid   Take 25 mcg by mouth every morning on an empty stomach. Total of 225 mcg once a day  Dose: 25 mcg     loratadine 10 MG Tabs  Commonly known as: Claritin   Take 10 mg by mouth every day.  Dose: 10 mg     sertraline 100 MG Tabs  Commonly known as: Zoloft   Take 200 mg by mouth every day.  Dose:  "200 mg     traZODone 50 MG Tabs  Commonly known as: Desyrel   Take 50 mg by mouth 1 time a day as needed. Indications: Trouble Sleeping  Dose: 50 mg     vitamin D 2000 UNIT Tabs   Take 4,000 Units by mouth every day.  Dose: 4,000 Units           * This list has 2 medication(s) that are the same as other medications prescribed for you. Read the directions carefully, and ask your doctor or other care provider to review them with you.                  Allergies  Allergies   Allergen Reactions    Abilify      \"Feeling tired, like I don't even know whats going on around me\"    Fish      Pt reports salmon causes him to be sick to his stomach  Not listed on MAR noted 2/3/2021      Geodon [Ziprasidone Hcl] Anaphylaxis     Anaphylaxis per patient    Aripiprazole      \"I became lethargic.\"    Hydroxyzine Itching     Pt will only state \"I feel itchy when I take it    Ziprasidone        DIET  No orders of the defined types were placed in this encounter.      ACTIVITY  As tolerated.  Weight bearing as tolerated    CONSULTATIONS  Neurology     PROCEDURES  none    LABORATORY  Lab Results   Component Value Date    SODIUM 134 (L) 10/29/2023    POTASSIUM 4.3 10/29/2023    CHLORIDE 96 10/29/2023    CO2 24 10/29/2023    GLUCOSE 183 (H) 10/29/2023    BUN 28 (H) 10/29/2023    CREATININE 1.22 10/29/2023    GLOMRATE 89 05/18/2023        Lab Results   Component Value Date    WBC 10.0 10/29/2023    HEMOGLOBIN 12.8 (L) 10/29/2023    HEMATOCRIT 40.6 (L) 10/29/2023    PLATELETCT 362 10/29/2023        Total time of the discharge process exceeds 35 minutes.  "

## 2023-11-02 NOTE — PROGRESS NOTES
Hospital Medicine Daily Progress Note    Date of Service  10/30/2023    Chief Complaint  Right-sided numbness and tingling    Hospital Course  Norm Prabhakar is a 43 y.o. male with asthma, bipolar disorder, poorly controlled diabetes, hypothyroidism, seizure disorder vs PNES, and history of hemiplegic migraine, admitted 10/27/2023 with headache and right-sided weakness/numbness.  On evaluation WBC was 9100, sodium 131, glucose 366, creatinine 1.13.  LFTs were unremarkable.  Troponin was 40.  Chest x-ray showed nothing acute.  CT of the head and neck did not show any large vessel occlusion or hemodynamically significant stenosis.  Perfusion head CT was negative.  Neurology was consulted who suspected that symptoms were functional or migrainous in etiology.  Patient was given migraine cocktail but symptoms were slow to improve and thus neurology recommended brain MRI and further observation.    Interval Problem Update  10/29/2023 - I reviewed the patient's chart. There were no significant overnight events. Remains hemodynamically stable and afebrile. Stable on RA.  ESR and CRP were low.  .  He was noted to have wound on the left foot, and x-ray was done which showed no acute osseous abnormality.  He was started on antibiotics.  Brain MRI showed unchanged severe supratentorial leukoencephalopathy with no acute findings.    > I have personally seen and examined the patient today.  He states he is still very weak.  Per nursing still requires assistance with mobility.  Denies any pain.  No nausea, vomiting, chest pain or shortness of breath.    10/30/2023- pt is medically cleared. He needs rehab. Placement is not an option. XR no osteomyelitis. Continue keflex. Not able to be discharge today due to transportation issue    I personally reviewed all lab results mentioned above. Prior medical records from this institution and outside facilities were independently reviewed as noted. I also personally reviewed all ER  physician and consultant recommendations and plans as documented above. History was independently obtained by myself. I have discussed this patient's plan of care and discharge plan at IDT rounds today with Case Management, Nursing, Nursing leadership, and other members of the IDT team.    Consultants/Specialty  neurology    Code Status  Prior    Disposition  The patient is medically cleared for discharge to home or a post-acute facility.      PT/OT recommends postacute placement.    I have placed the appropriate orders for post-discharge needs.    Review of Systems  ROS     Pertinent positives/negatives as mentioned above.     A complete review of systems was personally done by me. All other systems were negative.       Physical Exam       Physical Exam  Vitals reviewed.   Constitutional:       General: He is not in acute distress.     Appearance: Normal appearance. He is not toxic-appearing or diaphoretic.   HENT:      Head: Normocephalic and atraumatic.      Right Ear: External ear normal.      Left Ear: External ear normal.      Mouth/Throat:      Mouth: Mucous membranes are moist.      Pharynx: No oropharyngeal exudate.   Eyes:      General: No scleral icterus.     Extraocular Movements: Extraocular movements intact.      Conjunctiva/sclera: Conjunctivae normal.      Pupils: Pupils are equal, round, and reactive to light.   Cardiovascular:      Rate and Rhythm: Normal rate and regular rhythm.      Heart sounds: Normal heart sounds. No murmur heard.     No gallop.   Pulmonary:      Effort: Pulmonary effort is normal. No respiratory distress.      Breath sounds: Normal breath sounds. No stridor. No wheezing, rhonchi or rales.   Chest:      Chest wall: No tenderness.   Abdominal:      General: Bowel sounds are normal. There is no distension.      Palpations: Abdomen is soft. There is no mass.      Tenderness: There is no abdominal tenderness. There is no guarding or rebound.   Musculoskeletal:         General: No  swelling. Normal range of motion.      Cervical back: Normal range of motion and neck supple.      Right lower leg: No edema.      Left lower leg: No edema.   Lymphadenopathy:      Cervical: No cervical adenopathy.   Skin:     General: Skin is warm and dry.      Coloration: Skin is not jaundiced.      Findings: No rash.   Neurological:      General: No focal deficit present.      Mental Status: He is alert and oriented to person, place, and time.      Cranial Nerves: No cranial nerve deficit.      Comments: Stuttered speech   Psychiatric:         Mood and Affect: Mood normal.         Behavior: Behavior normal.         Thought Content: Thought content normal.         Judgment: Judgment normal.         Fluids  No intake or output data in the 24 hours ending 11/01/23 1712      Laboratory                                Imaging  DX-FOOT-2- LEFT   Final Result         No acute osseous abnormality.      MR-BRAIN-W/O   Final Result      1.  Unchanged severe supratentorial leukoencephalopathy   2.  No acute findings      DX-CHEST-PORTABLE (1 VIEW)   Final Result      No acute cardiopulmonary abnormality.         CT-CTA NECK WITH & W/O-POST PROCESSING   Final Result      1.  No evidence of carotid artery stenosis or occlusion.   2.  Vertebral basilar system intact.   3.  Heterogeneously enhancing mass in the anterior midline of the neck again seen similar to prior studies.      CT-CTA HEAD WITH & W/O-POST PROCESS   Final Result      1.  There is no large vessel occlusion.   2.  Diffuse low-attenuation of the bilateral white matter.      CT-CEREBRAL PERFUSION ANALYSIS   Final Result      1.  Cerebral blood flow less than 30% likely representing completed infarct = 0 mL.      2.  T Max more than 6 seconds likely representing combination of completed infarct and ischemia = 0 mL.      3.  Mismatched volume likely representing ischemic brain/penumbra = None      4.  Please note that the cerebral perfusion was performed on the  limited brain tissue around the basal ganglia region. Infarct/ischemia outside the CT perfusion sections can be missed in this study.      CT-HEAD W/O   Final Result      1.  No acute intracranial hemorrhage or evidence of territorial infarct.   2.  Diffuse bilateral hypodensity throughout the white matter unchanged compared with multiple prior studies including MRI and numerous prior CTs.              Assessment/Plan  * Right sided weakness- (present on admission)  Assessment & Plan  -With history of hemiplegic migraines.  Similar to prior presentations.  Seen by neurology and felt to be another episode of hemiplegic migraine. Was slow to improve with migraine cocktail so further observation.  Brain MRI showed unchanged severe supratentorial leukoencephalopathy with no acute findings.  -Suspect functional component.  -Continue statin.  -PT/OT recommending postacute placement, but may be difficult as he is observation and also has Medicaid FFS.    Seizure disorder (HCC)- (present on admission)  Assessment & Plan  - No active seizures.  Continue divalproex.  Maintain on seizure precautions.    Type 2 diabetes mellitus without complication, without long-term current use of insulin (HCC)- (present on admission)  Assessment & Plan  - Continue Lantus 18 units at bedtime along with sliding scale insulin coverage. Accu-Cheks before meals and at bedtime. Goal to keep BG between 140-180 per 2019 ADA guidelines.  Continue diabetic diet.         VTE prophylaxis: SCD      My total time spent caring for the patient on the day of the encounter was 37 minutes. This does not include time spent on separately billable procedures/tests.

## 2023-11-02 NOTE — PROGRESS NOTES
CHW Aida attempted to contact pt post discharge to follow up on resources provided at bedside and provide any other needed resources. CHW was unable to reach pt. CCM contact information was provided via . CHW will try again at a later time.

## 2023-11-09 NOTE — PROGRESS NOTES
CHW Aida attempted to contact pt post discharge to follow up on resources provided during inpatient stay. CHW was unable to reach pt. CCM contact was provided at bedside and via . CHW Aida will not continue to follow at this time.

## 2023-11-10 ENCOUNTER — HOSPITAL ENCOUNTER (EMERGENCY)
Facility: MEDICAL CENTER | Age: 43
End: 2023-11-10
Attending: EMERGENCY MEDICINE
Payer: MEDICAID

## 2023-11-10 VITALS
RESPIRATION RATE: 18 BRPM | TEMPERATURE: 98.2 F | HEART RATE: 80 BPM | HEIGHT: 78 IN | DIASTOLIC BLOOD PRESSURE: 68 MMHG | BODY MASS INDEX: 31.24 KG/M2 | SYSTOLIC BLOOD PRESSURE: 124 MMHG | OXYGEN SATURATION: 95 % | WEIGHT: 270 LBS

## 2023-11-10 DIAGNOSIS — F32.A CHRONIC DEPRESSION: ICD-10-CM

## 2023-11-10 PROCEDURE — A9270 NON-COVERED ITEM OR SERVICE: HCPCS | Mod: UD | Performed by: EMERGENCY MEDICINE

## 2023-11-10 PROCEDURE — 700102 HCHG RX REV CODE 250 W/ 637 OVERRIDE(OP): Mod: UD | Performed by: EMERGENCY MEDICINE

## 2023-11-10 PROCEDURE — 99283 EMERGENCY DEPT VISIT LOW MDM: CPT

## 2023-11-10 RX ORDER — DIVALPROEX SODIUM 500 MG/1
1000 TABLET, DELAYED RELEASE ORAL ONCE
Status: COMPLETED | OUTPATIENT
Start: 2023-11-10 | End: 2023-11-10

## 2023-11-10 RX ORDER — SERTRALINE HYDROCHLORIDE 100 MG/1
200 TABLET, FILM COATED ORAL ONCE
Status: COMPLETED | OUTPATIENT
Start: 2023-11-10 | End: 2023-11-10

## 2023-11-10 RX ORDER — LURASIDONE HYDROCHLORIDE 80 MG/1
80 TABLET, FILM COATED ORAL ONCE
Status: COMPLETED | OUTPATIENT
Start: 2023-11-10 | End: 2023-11-10

## 2023-11-10 RX ADMIN — DIVALPROEX SODIUM 1000 MG: 500 TABLET, DELAYED RELEASE ORAL at 15:30

## 2023-11-10 RX ADMIN — LURASIDONE HYDROCHLORIDE 80 MG: 80 TABLET, FILM COATED ORAL at 15:30

## 2023-11-10 RX ADMIN — SERTRALINE 200 MG: 100 TABLET, FILM COATED ORAL at 15:31

## 2023-11-10 ASSESSMENT — FIBROSIS 4 INDEX: FIB4 SCORE: 0.59

## 2023-11-10 NOTE — DISCHARGE INSTRUCTIONS
To manage chronic depression, it is important to continue your home medications.  Stopping your medications will make you feel worse.  On Monday, call to schedule follow-up with Dr. Corona, and also contact your .

## 2023-11-10 NOTE — ED NOTES
Pt medicated per MAR. Pt given cold lunch and educated on discharge instructions and follow up for behavioral health. Pt verbalizes understanding, and identifies available outpatient resources to help with coping.

## 2023-11-10 NOTE — ED PROVIDER NOTES
ER Provider Note     Norm Fabian M.D.. 11/10/2023  2:48 PM    Primary Care Provider: Pcp Pt States None    CHIEF COMPLAINT  Chief Complaint   Patient presents with    Depression     Pt states feeling depressed   Is feeling sad about breaking up with his fiancee  States not wanting to be here anymore   Feeling overwhelmed      EXTERNAL RECORDS REVIEWED  Inpatient and outpatient records show a well-documented history of bipolar disorder with depression.  History of anxiety as well.  Records show failed community out reach attempts on November 2 and November 9.     HPI/ROS  LIMITATION TO HISTORY   History is mildly limited by patient's well-documented history of bipolar disorder.    OUTSIDE HISTORIAN(S):      Norm Prabhakar is a 43 y.o. male who presents to the ED complaining of chronic depression, feeling a little bit more overwhelmed than usual over the past several days, and unable to contact his  or mental health clinic because today is a holiday.  He says that a few days ago, feeling a little bit more depressed than he often does, he stopped taking his medications, and has not had much of a diet.  He is not suicidal or homicidal.  He does not complain of any recent respiratory or GI illness.  He has not made any suicide attempts or gestures, he has not been drinking or using recreational drugs.  This patient is well-known to me in this facility, and appears at baseline.  It is my impression that he needed some psychosocial support, in the setting of chronic depression, and poor coping skills, unable to contact his usual providers today.  He does not report any specific acute triggers for depression today, though says he has been having a hard time since breaking up with his fiancée about a year ago.  He also has some longstanding depression from the death of his parents.    PAST MEDICAL HISTORY  Past Medical History:   Diagnosis Date    Anxiety     BIPOLAR    ASTHMA     Bipolar 1 disorder (HCC)      Depression     Diabetes (HCC)     Type II Diabetes    Fall     passed out 2 wks ago    Glaucoma     Glaucoma 1982    both eyes/ blind on left eye    Hypothyroidism     Indigestion     once in a while    Mental disorder     learning disabilities; speech impairment; developmental delays    Murmur     since birth    Pneumonia     remote    Psychiatric problem 2002    PTSD    S/P thyroidectomy     Seizure (HCC) 2010    Seizure disorder ([facility])     Unspecified disorder of thyroid        SURGICAL HISTORY  Past Surgical History:   Procedure Laterality Date    EYE SURGERY      OTHER      Hernia Repair when he was 10 yrs old    THYROID LOBECTOMY         FAMILY HISTORY  Family History   Problem Relation Age of Onset    Hypertension Mother     Heart Disease Mother     Lung Disease Mother     Stroke Maternal Grandmother        SOCIAL HISTORY   reports that he quit smoking about 3 years ago. His smoking use included cigarettes and cigars. He has never used smokeless tobacco. He reports that he does not currently use alcohol. He reports that he does not currently use drugs after having used the following drugs: Inhaled.    CURRENT MEDICATIONS  Discharge Medication List as of 11/10/2023  3:39 PM        CONTINUE these medications which have NOT CHANGED    Details   loratadine (CLARITIN) 10 MG Tab Take 10 mg by mouth every day., Historical Med      Cholecalciferol (VITAMIN D) 2000 UNIT Tab Take 4,000 Units by mouth every day., Historical Med      insulin glargine (LANTUS SOLOSTAR) 100 UNIT/ML Solution Pen-injector injection Inject 34 Units under the skin 2 times a day., Historical Med      atorvastatin (LIPITOR) 10 MG Tab Take 1 Tablet by mouth every evening., Disp-30 Tablet, R-3, Normal      Insulin Pen Needle 32 G x 4 mm Use one pen needle in pen device to inject insulin five times daily.Per patient/formulary preference. ICD-10 code: E11.65 Uncontrolled type 2 Diabetes MellitusDisp-500 Each, R-0, Normal      !! levothyroxine  "(SYNTHROID) 25 MCG Tab Take 25 mcg by mouth every morning on an empty stomach. Total of 225 mcg once a day, Historical Med      traZODone (DESYREL) 50 MG Tab Take 50 mg by mouth 1 time a day as needed. Indications: Trouble Sleeping, Historical Med      lurasidone (LATUDA) 80 MG Tab Take 80 mg by mouth with dinner., Historical Med      sertraline (ZOLOFT) 100 MG Tab Take 200 mg by mouth every day., Historical Med      !! levothyroxine (SYNTHROID) 200 MCG Tab Take 200 mcg by mouth every morning on an empty stomach. Total of 225 mcg once a day, Historical Med      divalproex (DEPAKOTE) 500 MG Tablet Delayed Response Take 500-1,500 mg by mouth 2 times a day. 500 mg in AM and 1500 mg at night, Historical Med       !! - Potential duplicate medications found. Please discuss with provider.          ALLERGIES  Abilify, Fish, Geodon [ziprasidone hcl], Aripiprazole, Hydroxyzine, and Ziprasidone    PHYSICAL EXAM  VITAL SIGNS: /68   Pulse 80   Temp 36.8 °C (98.2 °F) (Temporal)   Resp 18   Ht 1.981 m (6' 6\")   Wt 122 kg (270 lb)   SpO2 95%   BMI 31.20 kg/m²   Pulse ox interpretation: I interpret this pulse ox as normal.  Constitutional: Alert in no apparent distress.  HENT: No signs of trauma, Bilateral external ears normal, Nose normal.   Eyes: Pupils are equal and reactive, Conjunctiva normal, Non-icteric.   Neck: Normal range of motion, Supple, No stridor.    Cardiovascular: Normal peripheral perfusion  Thorax & Lungs: Unlabored respirations, equal chest expansion, no accessory muscle use  Abdomen: Non-distended  Skin:  No erythema, No rash.   Back: Normal alignment and ROM  Extremities: No gross deformity  Musculoskeletal: Good range of motion in all major joints.   Neurologic: Alert, Normal motor function, No focal deficits noted.   Psychiatric: Affect somewhat flat, at baseline per prior visits,, Judgment fair, Mood normal.  No SI or HI.      DIAGNOSTIC STUDIES    Labs:   Labs Reviewed - No data to " display    EKG:   I have independently interpreted this EKG  Results for orders placed or performed during the hospital encounter of 10/27/23   EKG (NOW)   Result Value Ref Range    Report       [facility] Emergency Dept.    Test Date:  2023-10-27  Pt Name:    HOLLY KIM                 Department: ER  MRN:        [id number]                      Room:        09  Gender:     Male                         Technician: [id number]  :        1980                   Requested By:ER TRIAGE PROTOCOL  Order #:    [id number]                    Reading MD: STEPHANIE NI MD    Measurements  Intervals                                Axis  Rate:       75                           P:          47  TN:         181                          QRS:        51  QRSD:       119                          T:          55  QT:         402  QTc:        449    Interpretive Statements  Sinus rhythm  Nonspecific intraventricular conduction delay  Low voltage, extremity leads  Compared to ECG 2023 19:17:55  Low QRS voltage now present  Electronically Signed On 10- 14:36:17 PDT by STEPHANIE NI MD             COURSE & MEDICAL DECISION MAKING     ED Observation Status? No; Patient does not meet criteria for ED Observation.     INITIAL ASSESSMENT, COURSE AND PLAN  Care Narrative:       2:48 PM Patient presents to the ED with a mild exacerbation of his chronic depression.  With his history of psychiatric illness, he has fairly limited coping skills, and was not able to contact his  or usual mental health clinic today because it is a holiday he is essentially here for psychosocial support.  He is not a suicide risk.  He does not meet any criteria for legal hold.  I had a candid conversation with the patient about the need to stay on his medications, since he sometimes goes off them when he feels worse than usual.  We talked about how this is compounding the problem, and he agrees.  He agrees to take his daily  medications here today.  We discussed that today is Friday, and he can call his  and mental health clinic on Monday. We discussed that he has chronic depression and does feel up and down from time to time.  The patient seems to be reassured.    ADDITIONAL PROBLEM LIST  History of bipolar disorder, chronic as well as acute depression.    DISPOSITION AND DISCUSSIONS    Escalation of care considered, and ultimately not performed: acute inpatient care management, however at this time, the patient is most appropriate for outpatient management.  His psychiatric symptoms are chronic, though slightly exacerbated today, he feels reassured after our discussion, and does not meet any criteria for legal hold, and agrees to take his home medications.    Barriers to care at this time, including but not limited to:  Limited coping skills secondary to chronic mental health issues .     Decision tools and prescription drugs considered including, but not limited to: Medication modification considered, but patient simply needs to be consistent with his home medications and follow-up next week with his providers .     The patient will return for new or worsening symptoms and is stable at the time of discharge.    The patient is referred to a primary physician for blood pressure management, diabetic screening, and for all other preventative health concerns.      DISPOSITION:  Patient will be discharged home in stable condition.    FOLLOW UP:  We discussed at the bedside that the patient will call his  and mental health provider Dr. Corona on Monday    OUTPATIENT MEDICATIONS:  Discharge Medication List as of 11/10/2023  3:39 PM            FINAL DIAGNOSIS  1. Chronic depression            Norm ROB M.D. (Stephanie), am scribing for, and in the presence of, Norm Fabian M.D..    Electronically signed by: Norm Fabian M.D. (Stephanie), 11/10/2023    Norm ROB M.D. personally performed the  services described in this documentation, as scribed by Norm Fabian M.D. in my presence, and it is both accurate and complete.

## 2023-11-10 NOTE — ED NOTES
"Pt given discharge instructions and educated on importance to follow up with behavioral health to help cope with recent life events and to start being compliant with home medications. Pt states that he is \"just upset that my fiance doesn't want to get back with me and it's been hard on me.\"   Pt with belongings, A&Ox4, even and unlabored respirations, steady gait out of ED.   "

## 2023-11-10 NOTE — ED TRIAGE NOTES
"Chief Complaint   Patient presents with    Depression     Pt states feeling depressed   Is feeling sad about breaking up with his fiancee  States not wanting to be here anymore   Feeling overwhelmed      /75   Pulse 85   Temp 36.9 °C (98.4 °F) (Temporal)   Resp 17   Ht 1.981 m (6' 6\")   Wt 122 kg (270 lb)   SpO2 96%   BMI 31.20 kg/m²     Pt made aware of triage process, placed back into lobby, educated pt to tell staff of any worsening of symptoms     "

## 2023-11-17 NOTE — REHAB-CM IDT TEAM NOTE
Case Management    DC Planning    DC destination/dispostion:  Return to [facility]. Lives at [facility].    Referrals: Not at this time, TBD.    DC Needs: DME for patient safety & mobility. MD f/u, requesting neuro referral.    Barriers to discharge:  Functional mobility deficits.    Strengths: Motivation, participation w/ therapies, age.    Section completed by:  Paty Hines R.N.      Refill request has been sent to [facility].

## 2023-12-07 NOTE — DISCHARGE PLANNING
Note:  Received call from Dr. Dung PEARL that pt reported that pt plans on going to the [facility] but his medications are still at his mother's house in [place]. RN CM spoke to ED RN, pt reported that he thinks his mother might have already delivered his medications to the [facility]. Received consent from pt to call his mother. RN CM called pt's mother, Leslie. Call went straight to voicemail. No answer, left voicemail for call back. RN CM called the [facility], no answer, no option to leave a voicemail. ED RN and ERP updated via Voalte. Hand off given to night shift ED RN .   yes

## 2024-01-08 ENCOUNTER — HOSPITAL ENCOUNTER (OUTPATIENT)
Facility: MEDICAL CENTER | Age: 44
End: 2024-01-09
Attending: EMERGENCY MEDICINE | Admitting: INTERNAL MEDICINE
Payer: MEDICAID

## 2024-01-08 DIAGNOSIS — R53.1 WEAKNESS: ICD-10-CM

## 2024-01-08 DIAGNOSIS — L03.113 CELLULITIS OF RIGHT UPPER EXTREMITY: ICD-10-CM

## 2024-01-08 DIAGNOSIS — E10.10 DIABETIC KETOACIDOSIS WITHOUT COMA ASSOCIATED WITH TYPE 1 DIABETES MELLITUS (HCC): ICD-10-CM

## 2024-01-08 DIAGNOSIS — E13.65 UNCONTROLLED OTHER SPECIFIED DIABETES MELLITUS WITH HYPERGLYCEMIA (HCC): ICD-10-CM

## 2024-01-08 DIAGNOSIS — L03.113 CELLULITIS OF RIGHT SHOULDER: ICD-10-CM

## 2024-01-08 DIAGNOSIS — E10.10 DKA, TYPE 1, NOT AT GOAL (HCC): ICD-10-CM

## 2024-01-08 LAB
ALBUMIN SERPL BCP-MCNC: 4.5 G/DL (ref 3.2–4.9)
ALBUMIN/GLOB SERPL: 2 G/DL
ALP SERPL-CCNC: 83 U/L (ref 30–99)
ALT SERPL-CCNC: 8 U/L (ref 2–50)
ANION GAP SERPL CALC-SCNC: 18 MMOL/L (ref 7–16)
AST SERPL-CCNC: 11 U/L (ref 12–45)
B-OH-BUTYR SERPL-MCNC: 0.11 MMOL/L (ref 0.02–0.27)
BASOPHILS # BLD AUTO: 1 % (ref 0–1.8)
BASOPHILS # BLD: 0.09 K/UL (ref 0–0.12)
BILIRUB SERPL-MCNC: 0.4 MG/DL (ref 0.1–1.5)
BUN SERPL-MCNC: 14 MG/DL (ref 8–22)
CALCIUM ALBUM COR SERPL-MCNC: 7.5 MG/DL (ref 8.5–10.5)
CALCIUM SERPL-MCNC: 7.9 MG/DL (ref 8.5–10.5)
CHLORIDE SERPL-SCNC: 93 MMOL/L (ref 96–112)
CO2 SERPL-SCNC: 19 MMOL/L (ref 20–33)
CREAT SERPL-MCNC: 0.81 MG/DL (ref 0.5–1.4)
EOSINOPHIL # BLD AUTO: 0.09 K/UL (ref 0–0.51)
EOSINOPHIL NFR BLD: 1 % (ref 0–6.9)
ERYTHROCYTE [DISTWIDTH] IN BLOOD BY AUTOMATED COUNT: 41.3 FL (ref 35.9–50)
GFR SERPLBLD CREATININE-BSD FMLA CKD-EPI: 113 ML/MIN/1.73 M 2
GLOBULIN SER CALC-MCNC: 2.3 G/DL (ref 1.9–3.5)
GLUCOSE BLD STRIP.AUTO-MCNC: 279 MG/DL (ref 65–99)
GLUCOSE BLD STRIP.AUTO-MCNC: 291 MG/DL (ref 65–99)
GLUCOSE BLD STRIP.AUTO-MCNC: 314 MG/DL (ref 65–99)
GLUCOSE BLD STRIP.AUTO-MCNC: 562 MG/DL (ref 65–99)
GLUCOSE SERPL-MCNC: 601 MG/DL (ref 65–99)
HCT VFR BLD AUTO: 35.7 % (ref 42–52)
HGB BLD-MCNC: 12.2 G/DL (ref 14–18)
IMM GRANULOCYTES # BLD AUTO: 0.09 K/UL (ref 0–0.11)
IMM GRANULOCYTES NFR BLD AUTO: 1 % (ref 0–0.9)
LACTATE SERPL-SCNC: 2 MMOL/L (ref 0.5–2)
LACTATE SERPL-SCNC: 2.5 MMOL/L (ref 0.5–2)
LACTATE SERPL-SCNC: 2.6 MMOL/L (ref 0.5–2)
LIPASE SERPL-CCNC: 14 U/L (ref 11–82)
LYMPHOCYTES # BLD AUTO: 2.81 K/UL (ref 1–4.8)
LYMPHOCYTES NFR BLD: 31.4 % (ref 22–41)
MCH RBC QN AUTO: 30.1 PG (ref 27–33)
MCHC RBC AUTO-ENTMCNC: 34.2 G/DL (ref 32.3–36.5)
MCV RBC AUTO: 88.1 FL (ref 81.4–97.8)
MONOCYTES # BLD AUTO: 0.52 K/UL (ref 0–0.85)
MONOCYTES NFR BLD AUTO: 5.8 % (ref 0–13.4)
NEUTROPHILS # BLD AUTO: 5.34 K/UL (ref 1.82–7.42)
NEUTROPHILS NFR BLD: 59.8 % (ref 44–72)
NRBC # BLD AUTO: 0 K/UL
NRBC BLD-RTO: 0 /100 WBC (ref 0–0.2)
PLATELET # BLD AUTO: 241 K/UL (ref 164–446)
PMV BLD AUTO: 10.9 FL (ref 9–12.9)
POTASSIUM SERPL-SCNC: 3.7 MMOL/L (ref 3.6–5.5)
PROT SERPL-MCNC: 6.8 G/DL (ref 6–8.2)
RBC # BLD AUTO: 4.05 M/UL (ref 4.7–6.1)
SODIUM SERPL-SCNC: 130 MMOL/L (ref 135–145)
WBC # BLD AUTO: 8.9 K/UL (ref 4.8–10.8)

## 2024-01-08 PROCEDURE — A9270 NON-COVERED ITEM OR SERVICE: HCPCS | Performed by: INTERNAL MEDICINE

## 2024-01-08 PROCEDURE — 96375 TX/PRO/DX INJ NEW DRUG ADDON: CPT

## 2024-01-08 PROCEDURE — 82962 GLUCOSE BLOOD TEST: CPT | Mod: 91

## 2024-01-08 PROCEDURE — 700101 HCHG RX REV CODE 250: Performed by: INTERNAL MEDICINE

## 2024-01-08 PROCEDURE — 700101 HCHG RX REV CODE 250: Mod: UD | Performed by: EMERGENCY MEDICINE

## 2024-01-08 PROCEDURE — 99285 EMERGENCY DEPT VISIT HI MDM: CPT

## 2024-01-08 PROCEDURE — 87641 MR-STAPH DNA AMP PROBE: CPT

## 2024-01-08 PROCEDURE — 80053 COMPREHEN METABOLIC PANEL: CPT

## 2024-01-08 PROCEDURE — 83690 ASSAY OF LIPASE: CPT

## 2024-01-08 PROCEDURE — 87070 CULTURE OTHR SPECIMN AEROBIC: CPT | Mod: XU

## 2024-01-08 PROCEDURE — 83605 ASSAY OF LACTIC ACID: CPT

## 2024-01-08 PROCEDURE — 96361 HYDRATE IV INFUSION ADD-ON: CPT

## 2024-01-08 PROCEDURE — 36415 COLL VENOUS BLD VENIPUNCTURE: CPT

## 2024-01-08 PROCEDURE — 96366 THER/PROPH/DIAG IV INF ADDON: CPT

## 2024-01-08 PROCEDURE — 87040 BLOOD CULTURE FOR BACTERIA: CPT

## 2024-01-08 PROCEDURE — 700101 HCHG RX REV CODE 250: Mod: UD | Performed by: HOSPITALIST

## 2024-01-08 PROCEDURE — 82010 KETONE BODYS QUAN: CPT

## 2024-01-08 PROCEDURE — 87205 SMEAR GRAM STAIN: CPT

## 2024-01-08 PROCEDURE — 700102 HCHG RX REV CODE 250 W/ 637 OVERRIDE(OP): Mod: UD | Performed by: INTERNAL MEDICINE

## 2024-01-08 PROCEDURE — 700105 HCHG RX REV CODE 258: Performed by: EMERGENCY MEDICINE

## 2024-01-08 PROCEDURE — 770020 HCHG ROOM/CARE - TELE (206)

## 2024-01-08 PROCEDURE — 87077 CULTURE AEROBIC IDENTIFY: CPT

## 2024-01-08 PROCEDURE — 700111 HCHG RX REV CODE 636 W/ 250 OVERRIDE (IP): Mod: UD | Performed by: EMERGENCY MEDICINE

## 2024-01-08 PROCEDURE — 700111 HCHG RX REV CODE 636 W/ 250 OVERRIDE (IP): Mod: JZ | Performed by: INTERNAL MEDICINE

## 2024-01-08 PROCEDURE — 96365 THER/PROPH/DIAG IV INF INIT: CPT

## 2024-01-08 PROCEDURE — 96372 THER/PROPH/DIAG INJ SC/IM: CPT | Mod: XU

## 2024-01-08 PROCEDURE — 85025 COMPLETE CBC W/AUTO DIFF WBC: CPT

## 2024-01-08 PROCEDURE — 700102 HCHG RX REV CODE 250 W/ 637 OVERRIDE(OP): Mod: UD | Performed by: EMERGENCY MEDICINE

## 2024-01-08 PROCEDURE — 99223 1ST HOSP IP/OBS HIGH 75: CPT | Performed by: INTERNAL MEDICINE

## 2024-01-08 RX ORDER — ATORVASTATIN CALCIUM 10 MG/1
10 TABLET, FILM COATED ORAL EVERY EVENING
Status: DISCONTINUED | OUTPATIENT
Start: 2024-01-09 | End: 2024-01-09 | Stop reason: HOSPADM

## 2024-01-08 RX ORDER — ENOXAPARIN SODIUM 100 MG/ML
40 INJECTION SUBCUTANEOUS DAILY
Status: DISCONTINUED | OUTPATIENT
Start: 2024-01-08 | End: 2024-01-09 | Stop reason: HOSPADM

## 2024-01-08 RX ORDER — DIVALPROEX SODIUM 500 MG/1
500-1500 TABLET, DELAYED RELEASE ORAL 2 TIMES DAILY
Status: DISCONTINUED | OUTPATIENT
Start: 2024-01-08 | End: 2024-01-08

## 2024-01-08 RX ORDER — SODIUM CHLORIDE, SODIUM LACTATE, POTASSIUM CHLORIDE, CALCIUM CHLORIDE 600; 310; 30; 20 MG/100ML; MG/100ML; MG/100ML; MG/100ML
2000 INJECTION, SOLUTION INTRAVENOUS ONCE
Status: COMPLETED | OUTPATIENT
Start: 2024-01-08 | End: 2024-01-08

## 2024-01-08 RX ORDER — SODIUM CHLORIDE AND POTASSIUM CHLORIDE 150; 900 MG/100ML; MG/100ML
INJECTION, SOLUTION INTRAVENOUS CONTINUOUS
Status: DISCONTINUED | OUTPATIENT
Start: 2024-01-08 | End: 2024-01-09 | Stop reason: HOSPADM

## 2024-01-08 RX ORDER — PROCHLORPERAZINE EDISYLATE 5 MG/ML
5-10 INJECTION INTRAMUSCULAR; INTRAVENOUS EVERY 4 HOURS PRN
Status: DISCONTINUED | OUTPATIENT
Start: 2024-01-08 | End: 2024-01-09 | Stop reason: HOSPADM

## 2024-01-08 RX ORDER — PROMETHAZINE HYDROCHLORIDE 25 MG/1
12.5-25 TABLET ORAL EVERY 4 HOURS PRN
Status: DISCONTINUED | OUTPATIENT
Start: 2024-01-08 | End: 2024-01-09 | Stop reason: HOSPADM

## 2024-01-08 RX ORDER — VITAMIN B COMPLEX
4000 TABLET ORAL DAILY
Status: DISCONTINUED | OUTPATIENT
Start: 2024-01-09 | End: 2024-01-09 | Stop reason: HOSPADM

## 2024-01-08 RX ORDER — POLYETHYLENE GLYCOL 3350 17 G/17G
1 POWDER, FOR SOLUTION ORAL
Status: DISCONTINUED | OUTPATIENT
Start: 2024-01-08 | End: 2024-01-09 | Stop reason: HOSPADM

## 2024-01-08 RX ORDER — OXYCODONE HYDROCHLORIDE 5 MG/1
5 TABLET ORAL
Status: DISCONTINUED | OUTPATIENT
Start: 2024-01-08 | End: 2024-01-09 | Stop reason: HOSPADM

## 2024-01-08 RX ORDER — OXYCODONE HYDROCHLORIDE 10 MG/1
10 TABLET ORAL
Status: DISCONTINUED | OUTPATIENT
Start: 2024-01-08 | End: 2024-01-09 | Stop reason: HOSPADM

## 2024-01-08 RX ORDER — ACETAMINOPHEN 325 MG/1
650 TABLET ORAL EVERY 6 HOURS PRN
Status: DISCONTINUED | OUTPATIENT
Start: 2024-01-08 | End: 2024-01-09 | Stop reason: HOSPADM

## 2024-01-08 RX ORDER — DIVALPROEX SODIUM 500 MG/1
1500 TABLET, DELAYED RELEASE ORAL NIGHTLY
Status: DISCONTINUED | OUTPATIENT
Start: 2024-01-08 | End: 2024-01-09 | Stop reason: HOSPADM

## 2024-01-08 RX ORDER — PROMETHAZINE HYDROCHLORIDE 25 MG/1
12.5-25 SUPPOSITORY RECTAL EVERY 4 HOURS PRN
Status: DISCONTINUED | OUTPATIENT
Start: 2024-01-08 | End: 2024-01-09 | Stop reason: HOSPADM

## 2024-01-08 RX ORDER — HYDROMORPHONE HYDROCHLORIDE 1 MG/ML
0.5 INJECTION, SOLUTION INTRAMUSCULAR; INTRAVENOUS; SUBCUTANEOUS
Status: DISCONTINUED | OUTPATIENT
Start: 2024-01-08 | End: 2024-01-09 | Stop reason: HOSPADM

## 2024-01-08 RX ORDER — AMOXICILLIN 250 MG
2 CAPSULE ORAL 2 TIMES DAILY
Status: DISCONTINUED | OUTPATIENT
Start: 2024-01-08 | End: 2024-01-09 | Stop reason: HOSPADM

## 2024-01-08 RX ORDER — LEVOTHYROXINE SODIUM 0.05 MG/1
25 TABLET ORAL
Status: DISCONTINUED | OUTPATIENT
Start: 2024-01-09 | End: 2024-01-08

## 2024-01-08 RX ORDER — DIVALPROEX SODIUM 500 MG/1
500 TABLET, DELAYED RELEASE ORAL EVERY MORNING
Status: DISCONTINUED | OUTPATIENT
Start: 2024-01-09 | End: 2024-01-09 | Stop reason: HOSPADM

## 2024-01-08 RX ORDER — ONDANSETRON 4 MG/1
4 TABLET, ORALLY DISINTEGRATING ORAL EVERY 4 HOURS PRN
Status: DISCONTINUED | OUTPATIENT
Start: 2024-01-08 | End: 2024-01-09 | Stop reason: HOSPADM

## 2024-01-08 RX ORDER — SODIUM CHLORIDE, SODIUM LACTATE, POTASSIUM CHLORIDE, CALCIUM CHLORIDE 600; 310; 30; 20 MG/100ML; MG/100ML; MG/100ML; MG/100ML
INJECTION, SOLUTION INTRAVENOUS CONTINUOUS
Status: DISCONTINUED | OUTPATIENT
Start: 2024-01-08 | End: 2024-01-09

## 2024-01-08 RX ORDER — TRAZODONE HYDROCHLORIDE 50 MG/1
50 TABLET ORAL
Status: DISCONTINUED | OUTPATIENT
Start: 2024-01-08 | End: 2024-01-09 | Stop reason: HOSPADM

## 2024-01-08 RX ORDER — ONDANSETRON 2 MG/ML
4 INJECTION INTRAMUSCULAR; INTRAVENOUS EVERY 4 HOURS PRN
Status: DISCONTINUED | OUTPATIENT
Start: 2024-01-08 | End: 2024-01-09 | Stop reason: HOSPADM

## 2024-01-08 RX ORDER — LEVOTHYROXINE SODIUM 0.1 MG/1
200 TABLET ORAL
Status: DISCONTINUED | OUTPATIENT
Start: 2024-01-09 | End: 2024-01-08

## 2024-01-08 RX ORDER — BISACODYL 10 MG
10 SUPPOSITORY, RECTAL RECTAL
Status: DISCONTINUED | OUTPATIENT
Start: 2024-01-08 | End: 2024-01-09 | Stop reason: HOSPADM

## 2024-01-08 RX ORDER — SERTRALINE HYDROCHLORIDE 100 MG/1
200 TABLET, FILM COATED ORAL DAILY
Status: DISCONTINUED | OUTPATIENT
Start: 2024-01-09 | End: 2024-01-09 | Stop reason: HOSPADM

## 2024-01-08 RX ORDER — LABETALOL HYDROCHLORIDE 5 MG/ML
10 INJECTION, SOLUTION INTRAVENOUS EVERY 4 HOURS PRN
Status: DISCONTINUED | OUTPATIENT
Start: 2024-01-08 | End: 2024-01-09 | Stop reason: HOSPADM

## 2024-01-08 RX ORDER — INSULIN LISPRO 100 [IU]/ML
3-14 INJECTION, SOLUTION INTRAVENOUS; SUBCUTANEOUS
Status: DISCONTINUED | OUTPATIENT
Start: 2024-01-08 | End: 2024-01-09 | Stop reason: HOSPADM

## 2024-01-08 RX ORDER — INSULIN LISPRO 100 [IU]/ML
0.2 INJECTION, SOLUTION INTRAVENOUS; SUBCUTANEOUS
Status: DISCONTINUED | OUTPATIENT
Start: 2024-01-09 | End: 2024-01-09 | Stop reason: HOSPADM

## 2024-01-08 RX ORDER — LURASIDONE HYDROCHLORIDE 80 MG/1
80 TABLET, FILM COATED ORAL
Status: DISCONTINUED | OUTPATIENT
Start: 2024-01-09 | End: 2024-01-09 | Stop reason: HOSPADM

## 2024-01-08 RX ORDER — LORATADINE 10 MG/1
10 TABLET ORAL DAILY
Status: DISCONTINUED | OUTPATIENT
Start: 2024-01-09 | End: 2024-01-09 | Stop reason: HOSPADM

## 2024-01-08 RX ADMIN — TRAZODONE HYDROCHLORIDE 50 MG: 50 TABLET ORAL at 22:45

## 2024-01-08 RX ADMIN — VANCOMYCIN HYDROCHLORIDE 3000 MG: 5 INJECTION, POWDER, LYOPHILIZED, FOR SOLUTION INTRAVENOUS at 19:00

## 2024-01-08 RX ADMIN — SODIUM CHLORIDE, POTASSIUM CHLORIDE, SODIUM LACTATE AND CALCIUM CHLORIDE 2000 ML: 600; 310; 30; 20 INJECTION, SOLUTION INTRAVENOUS at 18:25

## 2024-01-08 RX ADMIN — ENOXAPARIN SODIUM 40 MG: 100 INJECTION SUBCUTANEOUS at 21:05

## 2024-01-08 RX ADMIN — POTASSIUM CHLORIDE AND SODIUM CHLORIDE: 900; 150 INJECTION, SOLUTION INTRAVENOUS at 21:22

## 2024-01-08 RX ADMIN — DIVALPROEX SODIUM 1500 MG: 500 TABLET, DELAYED RELEASE ORAL at 22:37

## 2024-01-08 RX ADMIN — INSULIN LISPRO 7 UNITS: 100 INJECTION, SOLUTION INTRAVENOUS; SUBCUTANEOUS at 21:13

## 2024-01-08 RX ADMIN — SODIUM CHLORIDE, POTASSIUM CHLORIDE, SODIUM LACTATE AND CALCIUM CHLORIDE: 600; 310; 30; 20 INJECTION, SOLUTION INTRAVENOUS at 20:32

## 2024-01-08 RX ADMIN — INSULIN HUMAN 10 UNITS: 100 INJECTION, SOLUTION PARENTERAL at 18:27

## 2024-01-08 RX ADMIN — INSULIN GLARGINE-YFGN 34 UNITS: 100 INJECTION, SOLUTION SUBCUTANEOUS at 21:11

## 2024-01-08 RX ADMIN — MUPIROCIN 1 APPLICATION: 20 OINTMENT TOPICAL at 19:03

## 2024-01-08 ASSESSMENT — FIBROSIS 4 INDEX: FIB4 SCORE: 0.59

## 2024-01-08 NOTE — ED TRIAGE NOTES
"Chief Complaint   Patient presents with    High Blood Sugar      in triage, hx DM, off insulin since 12/31, no distress/nausea/tachypnea in triage     Pt BIB [facility] for above complaints, VSS on RA, GCS 15, NAD.    Pt returned to lobby. Educated on triage process and to inform staff of any changes.     /85   Pulse 76   Temp 36.2 °C (97.1 °F) (Temporal)   Resp 16   Ht 1.981 m (6' 6\")   Wt 116 kg (256 lb 2.8 oz)   SpO2 98%   BMI 29.60 kg/m²     "

## 2024-01-09 ENCOUNTER — PHARMACY VISIT (OUTPATIENT)
Dept: PHARMACY | Facility: MEDICAL CENTER | Age: 44
End: 2024-01-09
Payer: COMMERCIAL

## 2024-01-09 VITALS
RESPIRATION RATE: 16 BRPM | HEART RATE: 57 BPM | HEIGHT: 78 IN | TEMPERATURE: 97.5 F | BODY MASS INDEX: 29.74 KG/M2 | SYSTOLIC BLOOD PRESSURE: 139 MMHG | OXYGEN SATURATION: 96 % | DIASTOLIC BLOOD PRESSURE: 72 MMHG | WEIGHT: 257.06 LBS

## 2024-01-09 PROBLEM — L03.113 CELLULITIS OF RIGHT SHOULDER: Status: ACTIVE | Noted: 2024-01-09

## 2024-01-09 PROBLEM — L03.113 CELLULITIS OF RIGHT SHOULDER: Status: RESOLVED | Noted: 2024-01-09 | Resolved: 2024-01-09

## 2024-01-09 LAB
ALBUMIN SERPL BCP-MCNC: 3.7 G/DL (ref 3.2–4.9)
ALBUMIN/GLOB SERPL: 2.1 G/DL
ALP SERPL-CCNC: 67 U/L (ref 30–99)
ALT SERPL-CCNC: 7 U/L (ref 2–50)
ANION GAP SERPL CALC-SCNC: 11 MMOL/L (ref 7–16)
AST SERPL-CCNC: 11 U/L (ref 12–45)
BASOPHILS # BLD AUTO: 1 % (ref 0–1.8)
BASOPHILS # BLD: 0.1 K/UL (ref 0–0.12)
BILIRUB SERPL-MCNC: 0.3 MG/DL (ref 0.1–1.5)
BUN SERPL-MCNC: 15 MG/DL (ref 8–22)
CALCIUM ALBUM COR SERPL-MCNC: 8 MG/DL (ref 8.5–10.5)
CALCIUM SERPL-MCNC: 7.8 MG/DL (ref 8.5–10.5)
CHLORIDE SERPL-SCNC: 101 MMOL/L (ref 96–112)
CO2 SERPL-SCNC: 24 MMOL/L (ref 20–33)
CREAT SERPL-MCNC: 1.05 MG/DL (ref 0.5–1.4)
EOSINOPHIL # BLD AUTO: 0.15 K/UL (ref 0–0.51)
EOSINOPHIL NFR BLD: 1.5 % (ref 0–6.9)
ERYTHROCYTE [DISTWIDTH] IN BLOOD BY AUTOMATED COUNT: 42.4 FL (ref 35.9–50)
GFR SERPLBLD CREATININE-BSD FMLA CKD-EPI: 91 ML/MIN/1.73 M 2
GLOBULIN SER CALC-MCNC: 1.8 G/DL (ref 1.9–3.5)
GLUCOSE BLD STRIP.AUTO-MCNC: 279 MG/DL (ref 65–99)
GLUCOSE BLD STRIP.AUTO-MCNC: 285 MG/DL (ref 65–99)
GLUCOSE SERPL-MCNC: 280 MG/DL (ref 65–99)
GRAM STN SPEC: NORMAL
HCT VFR BLD AUTO: 30.3 % (ref 42–52)
HGB BLD-MCNC: 10.4 G/DL (ref 14–18)
IMM GRANULOCYTES # BLD AUTO: 0.07 K/UL (ref 0–0.11)
IMM GRANULOCYTES NFR BLD AUTO: 0.7 % (ref 0–0.9)
LYMPHOCYTES # BLD AUTO: 4.92 K/UL (ref 1–4.8)
LYMPHOCYTES NFR BLD: 50.4 % (ref 22–41)
MCH RBC QN AUTO: 30.4 PG (ref 27–33)
MCHC RBC AUTO-ENTMCNC: 34.3 G/DL (ref 32.3–36.5)
MCV RBC AUTO: 88.6 FL (ref 81.4–97.8)
MONOCYTES # BLD AUTO: 0.6 K/UL (ref 0–0.85)
MONOCYTES NFR BLD AUTO: 6.1 % (ref 0–13.4)
NEUTROPHILS # BLD AUTO: 3.93 K/UL (ref 1.82–7.42)
NEUTROPHILS NFR BLD: 40.3 % (ref 44–72)
NRBC # BLD AUTO: 0 K/UL
NRBC BLD-RTO: 0 /100 WBC (ref 0–0.2)
PLATELET # BLD AUTO: 211 K/UL (ref 164–446)
PMV BLD AUTO: 11.2 FL (ref 9–12.9)
POTASSIUM SERPL-SCNC: 3.4 MMOL/L (ref 3.6–5.5)
PROT SERPL-MCNC: 5.5 G/DL (ref 6–8.2)
RBC # BLD AUTO: 3.42 M/UL (ref 4.7–6.1)
SCCMEC + MECA PNL NOSE NAA+PROBE: NEGATIVE
SIGNIFICANT IND 70042: NORMAL
SITE SITE: NORMAL
SODIUM SERPL-SCNC: 136 MMOL/L (ref 135–145)
SOURCE SOURCE: NORMAL
WBC # BLD AUTO: 9.8 K/UL (ref 4.8–10.8)

## 2024-01-09 PROCEDURE — 96372 THER/PROPH/DIAG INJ SC/IM: CPT

## 2024-01-09 PROCEDURE — 700102 HCHG RX REV CODE 250 W/ 637 OVERRIDE(OP): Performed by: INTERNAL MEDICINE

## 2024-01-09 PROCEDURE — RXMED WILLOW AMBULATORY MEDICATION CHARGE: Performed by: HOSPITALIST

## 2024-01-09 PROCEDURE — 700105 HCHG RX REV CODE 258: Performed by: INTERNAL MEDICINE

## 2024-01-09 PROCEDURE — 700101 HCHG RX REV CODE 250: Performed by: INTERNAL MEDICINE

## 2024-01-09 PROCEDURE — 80053 COMPREHEN METABOLIC PANEL: CPT

## 2024-01-09 PROCEDURE — 85025 COMPLETE CBC W/AUTO DIFF WBC: CPT

## 2024-01-09 PROCEDURE — A9270 NON-COVERED ITEM OR SERVICE: HCPCS | Performed by: INTERNAL MEDICINE

## 2024-01-09 PROCEDURE — 700111 HCHG RX REV CODE 636 W/ 250 OVERRIDE (IP): Performed by: INTERNAL MEDICINE

## 2024-01-09 PROCEDURE — 700101 HCHG RX REV CODE 250: Mod: UD | Performed by: HOSPITALIST

## 2024-01-09 PROCEDURE — 82962 GLUCOSE BLOOD TEST: CPT

## 2024-01-09 PROCEDURE — 700105 HCHG RX REV CODE 258: Performed by: EMERGENCY MEDICINE

## 2024-01-09 PROCEDURE — G0378 HOSPITAL OBSERVATION PER HR: HCPCS

## 2024-01-09 PROCEDURE — 99239 HOSP IP/OBS DSCHRG MGMT >30: CPT | Performed by: HOSPITALIST

## 2024-01-09 RX ORDER — SULFAMETHOXAZOLE AND TRIMETHOPRIM 800; 160 MG/1; MG/1
1 TABLET ORAL EVERY 12 HOURS
Status: DISCONTINUED | OUTPATIENT
Start: 2024-01-09 | End: 2024-01-09 | Stop reason: HOSPADM

## 2024-01-09 RX ORDER — FENOFIBRATE 200 MG/1
200 CAPSULE ORAL DAILY
COMMUNITY

## 2024-01-09 RX ORDER — INSULIN GLARGINE 100 [IU]/ML
32 INJECTION, SOLUTION SUBCUTANEOUS 2 TIMES DAILY
Qty: 45 ML | Refills: 0 | Status: ACTIVE | OUTPATIENT
Start: 2024-01-09 | End: 2024-04-02

## 2024-01-09 RX ORDER — LATANOPROST 50 UG/ML
1 SOLUTION/ DROPS OPHTHALMIC NIGHTLY
COMMUNITY

## 2024-01-09 RX ORDER — AMOXICILLIN AND CLAVULANATE POTASSIUM 875; 125 MG/1; MG/1
1 TABLET, FILM COATED ORAL 2 TIMES DAILY
Qty: 14 TABLET | Refills: 0 | Status: ACTIVE | OUTPATIENT
Start: 2024-01-09 | End: 2024-01-16

## 2024-01-09 RX ORDER — INSULIN LISPRO 100 [IU]/ML
2-9 INJECTION, SOLUTION INTRAVENOUS; SUBCUTANEOUS
Qty: 9 ML | Refills: 0 | Status: ACTIVE | OUTPATIENT
Start: 2024-01-09 | End: 2024-02-12

## 2024-01-09 RX ORDER — ICOSAPENT ETHYL 1000 MG/1
2 CAPSULE ORAL 2 TIMES DAILY
COMMUNITY

## 2024-01-09 RX ORDER — ATORVASTATIN CALCIUM 40 MG/1
40 TABLET, FILM COATED ORAL NIGHTLY
Status: ON HOLD | COMMUNITY
End: 2024-01-09

## 2024-01-09 RX ORDER — POTASSIUM CHLORIDE 20 MEQ/1
40 TABLET, EXTENDED RELEASE ORAL DAILY
Status: DISCONTINUED | OUTPATIENT
Start: 2024-01-09 | End: 2024-01-09 | Stop reason: HOSPADM

## 2024-01-09 RX ORDER — LANCETS 30 GAUGE
EACH MISCELLANEOUS
Qty: 100 EACH | Refills: 0 | Status: SHIPPED | OUTPATIENT
Start: 2024-01-09 | End: 2024-02-06

## 2024-01-09 RX ADMIN — POTASSIUM CHLORIDE AND SODIUM CHLORIDE: 900; 150 INJECTION, SOLUTION INTRAVENOUS at 06:32

## 2024-01-09 RX ADMIN — SODIUM CHLORIDE, POTASSIUM CHLORIDE, SODIUM LACTATE AND CALCIUM CHLORIDE: 600; 310; 30; 20 INJECTION, SOLUTION INTRAVENOUS at 05:39

## 2024-01-09 RX ADMIN — LEVOTHYROXINE SODIUM 225 MCG: 0.12 TABLET ORAL at 05:26

## 2024-01-09 RX ADMIN — INSULIN LISPRO 7 UNITS: 100 INJECTION, SOLUTION INTRAVENOUS; SUBCUTANEOUS at 12:28

## 2024-01-09 RX ADMIN — SULFAMETHOXAZOLE AND TRIMETHOPRIM 1 TABLET: 800; 160 TABLET ORAL at 12:33

## 2024-01-09 RX ADMIN — MUPIROCIN 2 %: 20 OINTMENT TOPICAL at 05:30

## 2024-01-09 RX ADMIN — Medication 4000 UNITS: at 05:23

## 2024-01-09 RX ADMIN — LORATADINE 10 MG: 10 TABLET ORAL at 05:24

## 2024-01-09 RX ADMIN — INSULIN LISPRO 8 UNITS: 100 INJECTION, SOLUTION INTRAVENOUS; SUBCUTANEOUS at 08:34

## 2024-01-09 RX ADMIN — DOCUSATE SODIUM 50 MG AND SENNOSIDES 8.6 MG 2 TABLET: 8.6; 5 TABLET, FILM COATED ORAL at 05:23

## 2024-01-09 RX ADMIN — INSULIN LISPRO 8 UNITS: 100 INJECTION, SOLUTION INTRAVENOUS; SUBCUTANEOUS at 12:28

## 2024-01-09 RX ADMIN — VANCOMYCIN HYDROCHLORIDE 1750 MG: 5 INJECTION, POWDER, LYOPHILIZED, FOR SOLUTION INTRAVENOUS at 04:40

## 2024-01-09 RX ADMIN — SERTRALINE 200 MG: 100 TABLET, FILM COATED ORAL at 05:24

## 2024-01-09 RX ADMIN — MUPIROCIN 1 APPLICATION: 20 OINTMENT TOPICAL at 12:36

## 2024-01-09 RX ADMIN — ACETAMINOPHEN 650 MG: 325 TABLET, FILM COATED ORAL at 12:33

## 2024-01-09 RX ADMIN — INSULIN LISPRO 7 UNITS: 100 INJECTION, SOLUTION INTRAVENOUS; SUBCUTANEOUS at 08:34

## 2024-01-09 RX ADMIN — POTASSIUM CHLORIDE 40 MEQ: 1500 TABLET, EXTENDED RELEASE ORAL at 06:26

## 2024-01-09 RX ADMIN — DIVALPROEX SODIUM 500 MG: 500 TABLET, DELAYED RELEASE ORAL at 05:28

## 2024-01-09 ASSESSMENT — ENCOUNTER SYMPTOMS
VOMITING: 0
COUGH: 0
HALLUCINATIONS: 0
ORTHOPNEA: 0
SPUTUM PRODUCTION: 0
TREMORS: 0
BACK PAIN: 0
BRUISES/BLEEDS EASILY: 0
NECK PAIN: 0
FLANK PAIN: 0
WEIGHT LOSS: 0
DOUBLE VISION: 0
PHOTOPHOBIA: 0
NAUSEA: 0
NERVOUS/ANXIOUS: 0
WEAKNESS: 1
FEVER: 0
HEMOPTYSIS: 0
BLURRED VISION: 0
FOCAL WEAKNESS: 0
CHILLS: 0
POLYDIPSIA: 0
HEARTBURN: 0
PALPITATIONS: 0
SPEECH CHANGE: 0
HEADACHES: 0

## 2024-01-09 ASSESSMENT — LIFESTYLE VARIABLES
HAVE PEOPLE ANNOYED YOU BY CRITICIZING YOUR DRINKING: NO
TOTAL SCORE: 0
SUBSTANCE_ABUSE: 0
ALCOHOL_USE: NO
HOW MANY TIMES IN THE PAST YEAR HAVE YOU HAD 5 OR MORE DRINKS IN A DAY: 0
TOTAL SCORE: 0
EVER FELT BAD OR GUILTY ABOUT YOUR DRINKING: NO
CONSUMPTION TOTAL: NEGATIVE
ON A TYPICAL DAY WHEN YOU DRINK ALCOHOL HOW MANY DRINKS DO YOU HAVE: 0
EVER HAD A DRINK FIRST THING IN THE MORNING TO STEADY YOUR NERVES TO GET RID OF A HANGOVER: NO
AVERAGE NUMBER OF DAYS PER WEEK YOU HAVE A DRINK CONTAINING ALCOHOL: 0
DOES PATIENT WANT TO STOP DRINKING: NO
HAVE YOU EVER FELT YOU SHOULD CUT DOWN ON YOUR DRINKING: NO
TOTAL SCORE: 0

## 2024-01-09 ASSESSMENT — COGNITIVE AND FUNCTIONAL STATUS - GENERAL
DAILY ACTIVITIY SCORE: 24
SUGGESTED CMS G CODE MODIFIER MOBILITY: CH
SUGGESTED CMS G CODE MODIFIER DAILY ACTIVITY: CH
MOBILITY SCORE: 24

## 2024-01-09 ASSESSMENT — FIBROSIS 4 INDEX: FIB4 SCORE: 0.76

## 2024-01-09 NOTE — ED NOTES
"Med Rec PARTIAL per PT at bedside.  No oral ABX in the last 30 days.     Pt states he's unsure of all his medications and has not taken them in \"about 2 days\"    Pt reports that he uses Lantus 32 units BID and another short acting insulin TID, but has not had insulin in over 7 days.     Home Pharmacy:  [facility]  "
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
## 2024-01-09 NOTE — ED NOTES
Bedside report from OMID Cabrales. Pt on gurney in lowest, locked position, on RA, and continuous cardiac monitoring. Fall precautions in place, including fall risk sign. Pt updated on plan of care. No needs at this time. Call light within reach.

## 2024-01-09 NOTE — DISCHARGE PLANNING
DC Transport Scheduled    Received request at: 1/9/2024 at 1403    Transport Company Scheduled:  [employer]  Spoke with Wilber at [facility] to schedule transport.  [facility] Trip #:[id number]    Scheduled Date: 1/9/2024  Scheduled Time: 1530    Destination: Home at [address] [place]     Notified care team of scheduled transport via Voalte.     If there are any changes needed to the DC transportation scheduled, please contact [facility] at ext. [phone] between the hours of 2768-1218 Mon-Fri. If outside those hours, contact the ED Case Manager at ext. [phone].

## 2024-01-09 NOTE — ASSESSMENT & PLAN NOTE
Noncompliance with insulin in the last few weeks  Presented with blood sugar in the 600s and mild DKA  Given 10 units of regular insulin IV, IV fluids  Will place back on Lantus, insulin sliding scale  Diabetic education

## 2024-01-09 NOTE — PROGRESS NOTES
"Pharmacy Vancomycin Kinetics Note for 1/8/2024     43 y.o. male on Vancomycin day # 1     Vancomycin Indication (AUC Dosing): Skin/skin structure infection    Provider specified end date: 01/14/24    Active Antibiotics (From admission, onward)      Ordered     Ordering Provider       Mon Jan 8, 2024  8:49 PM    01/08/24 2049  vancomycin (Vancocin) 1,750 mg in  mL IVPB  (vancomycin (VANCOCIN) IV (LD + Maintenance))  EVERY 8 HOURS         Zenon Chase M.D.       Mon Jan 8, 2024  8:22 PM    01/08/24 2022  MD Alert...Vancomycin per Pharmacy  PHARMACY TO DOSE        Question:  Indication(s) for vancomycin?  Answer:  Skin and soft tissue infection    Zenon Chase M.D.       Mon Jan 8, 2024  5:56 PM    01/08/24 1756  vancomycin (Vancocin) 3,000 mg in  mL IVPB  (vancomycin (VANCOCIN) IV (LD + Maintenance))  ONCE        Note to Pharmacy: Dose per Pharmacokinetic Protocol    Amauri Godinez M.D.            Dosing Weight: 116 kg (255 lb 11.7 oz)      Admission History: Admitted on 1/8/2024 for DKA, type 1, not at goal (HCC) [E10.10]  Pertinent history: Patient here due to DKA, also has a purulent right shoulder cellulitis. Blood cultures and wound cultures taken. No fever or elevation in WBCs at this time.    Allergies:     Abilify, Fish, Geodon [ziprasidone hcl], Aripiprazole, Hydroxyzine, and Ziprasidone     Pertinent cultures to date:     Results       Procedure Component Value Units Date/Time    CULTURE WOUND W/ GRAM STAIN [[id number]] Collected: 01/08/24 1903    Order Status: Sent Specimen: Wound from Abscess Updated: 01/08/24 2016    Blood Culture [[id number]] Collected: 01/08/24 1818    Order Status: Sent Specimen: Blood from Peripheral Updated: 01/08/24 1849    Narrative:      1 of 2 for Blood Culture x 2 sites order. Per Hospital  Policy: Only change Specimen Src: to \"Line\" if specified by  physician order.    Blood Culture [[id number]] Collected: 01/08/24 1818    Order Status: Sent Specimen: Blood " "from Peripheral Updated: 24 1831    Narrative:      2 of 2 blood culture x2  Sites order. Per Hospital Policy:  Only change Specimen Src: to \"Line\" if specified by physician  order.    URINALYSIS [[id number]]     Order Status: Sent Specimen: Urine             Labs:     Estimated Creatinine Clearance: 171.5 mL/min (by C-G formula based on SCr of 0.81 mg/dL).  Recent Labs     24  1555   WBC 8.9   NEUTSPOLYS 59.80     Recent Labs     24  1555   BUN 14   CREATININE 0.81   ALBUMIN 4.5     No intake or output data in the 24 hours ending 24 2131   /57   Pulse 66   Temp 36.2 °C (97.1 °F) (Temporal)   Resp 15   Ht 1.981 m (6' 6\")   Wt 116 kg (256 lb 2.8 oz)   SpO2 96%  Temp (24hrs), Av.2 °C (97.1 °F), Min:36.2 °C (97.1 °F), Max:36.2 °C (97.1 °F)      List concerns for Vancomycin clearance:     None    Pharmacokinetics:     AUC kinetics:   Ke (hr ^-1): 0.1467 hr^-1  Half life: 4.72 hr  Clearance: 11.061  Estimated TDD: 5530.5  Estimated Dose: 1544  Estimated interval: 6.7    A/P:     -  Vancomycin dose: 3g load followed by 1750mg Q8H.     -  Next vancomycin level(s):    TBD by clinical pharmacist.     -  Predicted vancomycin AUC from initial AUC test calculator: 475 mg·hr/L    -  Comments: MRSA nares ordered.     Maximiliano Echols, PharmD    "

## 2024-01-09 NOTE — H&P
Hospital Medicine History & Physical Note    Date of Service  1/8/2024    Primary Care Physician  Pcp Pt States None      Code Status  Full Code    Chief Complaint  Chief Complaint   Patient presents with    High Blood Sugar      in triage, hx DM, off insulin since 12/31, no distress/nausea/tachypnea in triage       History of Presenting Illness  Norm Prabhakar is a 43 y.o. male with past medical history of asthma, uncontrolled diabetes, psychiatric problem, right-sided weakness, PTSD, CVA, seizure, conversion disorder who presented 1/8/2024 with complaints of nonhealing skin lesion over the right shoulder for 2 months, generalized weakness, thirst and frequent urination.  He noted to have blood sugar in the 600s, bicarb 19, anion gap 18, lactic acid 2.6.  He has not been on insulin for 2 weeks.  He used to take 34 units of Lantus every day.  Treatment in ER included IV insulin 10 units, crystalloids, IV vancomycin for possible mild cellulitis around the lesion of the right shoulder    I discussed the plan of care with patient and ERP .    Review of Systems  Review of Systems   Constitutional:  Negative for chills, fever and weight loss.   HENT:  Negative for ear pain, hearing loss and tinnitus.    Eyes:  Negative for blurred vision, double vision and photophobia.   Respiratory:  Negative for cough, hemoptysis and sputum production.    Cardiovascular:  Negative for chest pain, palpitations and orthopnea.   Gastrointestinal:  Negative for heartburn, nausea and vomiting.   Genitourinary:  Negative for dysuria, flank pain, frequency and hematuria.   Musculoskeletal:  Negative for back pain, joint pain and neck pain.   Skin:  Positive for rash. Negative for itching.   Neurological:  Positive for weakness. Negative for tremors, speech change, focal weakness and headaches.   Endo/Heme/Allergies:  Negative for environmental allergies and polydipsia. Does not bruise/bleed easily.   Psychiatric/Behavioral:  Negative  "for hallucinations and substance abuse. The patient is not nervous/anxious.        Past Medical History   has a past medical history of Anxiety, ASTHMA, Bipolar 1 disorder (HCC), Depression, Diabetes (HCC), Fall, Glaucoma, Glaucoma (1982), Hypothyroidism, Indigestion, Mental disorder, Murmur, Pneumonia, Psychiatric problem (2002), S/P thyroidectomy, Seizure (HCC) (2010), Seizure disorder (HCC), and Unspecified disorder of thyroid.    Surgical History   has a past surgical history that includes eye surgery; thyroid lobectomy; and other.     Family History  family history includes Heart Disease in his mother; Hypertension in his mother; Lung Disease in his mother; Stroke in his maternal grandmother.   Family history reviewed with patient. There is no family history that is pertinent to the chief complaint.     Social History   reports that he quit smoking about 3 years ago. His smoking use included cigarettes and cigars. He has never used smokeless tobacco. He reports that he does not currently use alcohol. He reports that he does not currently use drugs after having used the following drugs: Inhaled.    Allergies  Allergies   Allergen Reactions    Abilify      \"Feeling tired, like I don't even know whats going on around me\"    Fish      Pt reports salmon causes him to be sick to his stomach  Not listed on MAR noted 2/3/2021      Geodon [Ziprasidone Hcl] Anaphylaxis     Anaphylaxis per patient    Aripiprazole      \"I became lethargic.\"    Hydroxyzine Itching     Pt will only state \"I feel itchy when I take it    Ziprasidone        Medications  Prior to Admission Medications   Prescriptions Last Dose Informant Patient Reported? Taking?   Cholecalciferol (VITAMIN D) 2000 UNIT Tab   Yes No   Sig: Take 4,000 Units by mouth every day.   Insulin Pen Needle 32 G x 4 mm   No No   Sig: Use one pen needle in pen device to inject insulin five times daily.   atorvastatin (LIPITOR) 10 MG Tab   No No   Sig: Take 1 Tablet by mouth " every evening.   divalproex (DEPAKOTE) 500 MG Tablet Delayed Response   Yes No   Sig: Take 500-1,500 mg by mouth 2 times a day. 500 mg in AM and 1500 mg at night   insulin glargine (LANTUS SOLOSTAR) 100 UNIT/ML Solution Pen-injector injection   Yes No   Sig: Inject 34 Units under the skin 2 times a day.   levothyroxine (SYNTHROID) 200 MCG Tab   Yes No   Sig: Take 200 mcg by mouth every morning on an empty stomach. Total of 225 mcg once a day   levothyroxine (SYNTHROID) 25 MCG Tab   Yes No   Sig: Take 25 mcg by mouth every morning on an empty stomach. Total of 225 mcg once a day   loratadine (CLARITIN) 10 MG Tab   Yes No   Sig: Take 10 mg by mouth every day.   lurasidone (LATUDA) 80 MG Tab   Yes No   Sig: Take 80 mg by mouth with dinner.   sertraline (ZOLOFT) 100 MG Tab   Yes No   Sig: Take 200 mg by mouth every day.   traZODone (DESYREL) 50 MG Tab   Yes No   Sig: Take 50 mg by mouth 1 time a day as needed. Indications: Trouble Sleeping      Facility-Administered Medications: None       Physical Exam  Temp:  [36.2 °C (97.1 °F)] 36.2 °C (97.1 °F)  Pulse:  [61-76] 61  Resp:  [15-18] 15  BP: (114-121)/(74-85) 114/74  SpO2:  [92 %-98 %] 97 %  Blood Pressure: 114/74   Temperature: 36.2 °C (97.1 °F)   Pulse: 61   Respiration: 15   Pulse Oximetry: 97 %       Physical Exam  Vitals and nursing note reviewed.   Constitutional:       General: He is not in acute distress.     Appearance: Normal appearance.   HENT:      Head: Normocephalic and atraumatic.      Nose: Nose normal.      Mouth/Throat:      Mouth: Mucous membranes are moist.   Eyes:      Extraocular Movements: Extraocular movements intact.      Pupils: Pupils are equal, round, and reactive to light.   Cardiovascular:      Rate and Rhythm: Normal rate and regular rhythm.   Pulmonary:      Effort: Pulmonary effort is normal.      Breath sounds: Normal breath sounds.   Abdominal:      General: Abdomen is flat. There is no distension.      Tenderness: There is no  "abdominal tenderness.   Musculoskeletal:         General: No swelling or deformity. Normal range of motion.      Cervical back: Normal range of motion and neck supple.      Comments: Right shoulder: Crusted lesion with minimal purulence   Skin:     General: Skin is warm and dry.   Neurological:      General: No focal deficit present.      Mental Status: He is alert and oriented to person, place, and time.   Psychiatric:         Mood and Affect: Mood normal.         Behavior: Behavior normal.         Laboratory:  Recent Labs     01/08/24  1555   WBC 8.9   RBC 4.05*   HEMOGLOBIN 12.2*   HEMATOCRIT 35.7*   MCV 88.1   MCH 30.1   MCHC 34.2   RDW 41.3   PLATELETCT 241   MPV 10.9     Recent Labs     01/08/24  1555   SODIUM 130*   POTASSIUM 3.7   CHLORIDE 93*   CO2 19*   GLUCOSE 601*   BUN 14   CREATININE 0.81   CALCIUM 7.9*     Recent Labs     01/08/24  1555   ALTSGPT 8   ASTSGOT 11*   ALKPHOSPHAT 83   TBILIRUBIN 0.4   LIPASE 14   GLUCOSE 601*         No results for input(s): \"NTPROBNP\" in the last 72 hours.      No results for input(s): \"TROPONINT\" in the last 72 hours.    Imaging:  No orders to display           Assessment/Plan:  Justification for Admission Status  I anticipate this patient will require at least two midnights for appropriate medical management, necessitating inpatient admission because DKA, cellulitis    Patient will need a Telemetry bed on MEDICAL service .  The need is secondary to DKA, cellulitis.    Wound and cellulitis of right shoulder  Assessment & Plan  A dose of vancomycin given in ER  Will switch to p.o. Bactrim a cellulitis is not extensive  Wound care consult    Uncontrolled diabetes mellitus with hyperglycemia (HCC) and DKA- (present on admission)  Assessment & Plan  Noncompliance with insulin in the last few weeks  Presented with blood sugar in the 600s and mild DKA  Given 10 units of regular insulin IV, IV fluids  Will place back on Lantus, insulin sliding scale  Diabetic " education      Seizure disorder (HCC)- (present on admission)  Assessment & Plan  Continue Depakote    Postoperative hypothyroidism- (present on admission)  Assessment & Plan  Resume levothyroxine    Asthma- (present on admission)  Assessment & Plan  Albuterol as needed.  Not in exacerbation        VTE prophylaxis: enoxaparin ppx

## 2024-01-09 NOTE — ED PROVIDER NOTES
ED Provider Note    CHIEF COMPLAINT  Chief Complaint   Patient presents with    High Blood Sugar      in triage, hx DM, off insulin since 12/31, no distress/nausea/tachypnea in triage       EXTERNAL RECORDS REVIEWED  Inpatient Notes admitted to hospital in October of last year with possible stroke.  MRI negative for stroke but patient had severe supratentorial leukoencephalopathy.    HPI/ROS      Norm Prabhakar is a 43 y.o. male who presents with elevated blood sugar.  Patient's power went out a little over a week ago.  His insulin went bad he has not used insulin since 12/31.  He is starting to feel generally weak tired having some spots in his vision.  He also notes a lesion on his right shoulder that is tender and draining pus.  He has not had a fever.    PAST MEDICAL HISTORY   has a past medical history of Anxiety, ASTHMA, Bipolar 1 disorder ([facility]), Depression, Diabetes ([facility]), Fall, Glaucoma, Glaucoma (1982), Hypothyroidism, Indigestion, Mental disorder, Murmur, Pneumonia, Psychiatric problem (2002), S/P thyroidectomy, Seizure ([facility]) (2010), Seizure disorder ([facility]), and Unspecified disorder of thyroid.    SURGICAL HISTORY   has a past surgical history that includes eye surgery; thyroid lobectomy; and other.    FAMILY HISTORY  Family History   Problem Relation Age of Onset    Hypertension Mother     Heart Disease Mother     Lung Disease Mother     Stroke Maternal Grandmother        SOCIAL HISTORY  Social History     Tobacco Use    Smoking status: Former     Current packs/day: 0.00     Types: Cigarettes, Cigars     Quit date: 4/1/2020     Years since quitting: 3.7    Smokeless tobacco: Never   Vaping Use    Vaping Use: Former    Quit date: 9/1/2023    Substances: Nicotine, THC, CBD, Flavoring    Devices: Disposable   Substance and Sexual Activity    Alcohol use: Not Currently     Comment: occasionally    Drug use: Not Currently     Types: Inhaled     Comment: marijuana every few days    Sexual activity: Not  "Currently       CURRENT MEDICATIONS  Home Medications       Reviewed by Juan Nolan R.N. (Registered Nurse) on 01/08/24 at 1550  Med List Status: Not Addressed     Medication Last Dose Status   atorvastatin (LIPITOR) 10 MG Tab  Active   Cholecalciferol (VITAMIN D) 2000 UNIT Tab  Active   divalproex (DEPAKOTE) 500 MG Tablet Delayed Response  Active   insulin glargine (LANTUS SOLOSTAR) 100 UNIT/ML Solution Pen-injector injection  Active   Insulin Pen Needle 32 G x 4 mm  Active   levothyroxine (SYNTHROID) 200 MCG Tab  Active   levothyroxine (SYNTHROID) 25 MCG Tab  Active   loratadine (CLARITIN) 10 MG Tab  Active   lurasidone (LATUDA) 80 MG Tab  Active   sertraline (ZOLOFT) 100 MG Tab  Active   traZODone (DESYREL) 50 MG Tab  Active                    ALLERGIES  Allergies   Allergen Reactions    Abilify      \"Feeling tired, like I don't even know whats going on around me\"    Fish      Pt reports salmon causes him to be sick to his stomach  Not listed on MAR noted 2/3/2021      Geodon [Ziprasidone Hcl] Anaphylaxis     Anaphylaxis per patient    Aripiprazole      \"I became lethargic.\"    Hydroxyzine Itching     Pt will only state \"I feel itchy when I take it    Ziprasidone        PHYSICAL EXAM  VITAL SIGNS: /75   Pulse 67   Temp 36.2 °C (97.1 °F) (Temporal)   Resp 16   Ht 1.981 m (6' 6\")   Wt 116 kg (256 lb 2.8 oz)   SpO2 92%   BMI 29.60 kg/m²    Constitutional: Awake and alert  HENT: Dry mucous membranes  Eyes: Normal inspection  Neck: Grossly normal range of motion.  Cardiovascular: Normal heart rate, Normal rhythm.  Symmetric peripheral pulses.   Thorax & Lungs: No respiratory distress, No wheezing, No rales, No rhonchi, No chest tenderness.   Abdomen: Bowel sounds normal, soft, non-distended, nontender, no mass  Skin: Crusted lesion right shoulder with underlying purulent exudate  Extremities: Well-perfused  Neurologic: Grossly normal   Psychiatric: Normal for situation    DIAGNOSTIC STUDIES / " PROCEDURES    LABS  Results for orders placed or performed during the hospital encounter of 01/08/24   CBC WITH DIFFERENTIAL   Result Value Ref Range    WBC 8.9 4.8 - 10.8 K/uL    RBC 4.05 (L) 4.70 - 6.10 M/uL    Hemoglobin 12.2 (L) 14.0 - 18.0 g/dL    Hematocrit 35.7 (L) 42.0 - 52.0 %    MCV 88.1 81.4 - 97.8 fL    MCH 30.1 27.0 - 33.0 pg    MCHC 34.2 32.3 - 36.5 g/dL    RDW 41.3 35.9 - 50.0 fL    Platelet Count 241 164 - 446 K/uL    MPV 10.9 9.0 - 12.9 fL    Neutrophils-Polys 59.80 44.00 - 72.00 %    Lymphocytes 31.40 22.00 - 41.00 %    Monocytes 5.80 0.00 - 13.40 %    Eosinophils 1.00 0.00 - 6.90 %    Basophils 1.00 0.00 - 1.80 %    Immature Granulocytes 1.00 (H) 0.00 - 0.90 %    Nucleated RBC 0.00 0.00 - 0.20 /100 WBC    Neutrophils (Absolute) 5.34 1.82 - 7.42 K/uL    Lymphs (Absolute) 2.81 1.00 - 4.80 K/uL    Monos (Absolute) 0.52 0.00 - 0.85 K/uL    Eos (Absolute) 0.09 0.00 - 0.51 K/uL    Baso (Absolute) 0.09 0.00 - 0.12 K/uL    Immature Granulocytes (abs) 0.09 0.00 - 0.11 K/uL    NRBC (Absolute) 0.00 K/uL   COMP METABOLIC PANEL   Result Value Ref Range    Sodium 130 (L) 135 - 145 mmol/L    Potassium 3.7 3.6 - 5.5 mmol/L    Chloride 93 (L) 96 - 112 mmol/L    Co2 19 (L) 20 - 33 mmol/L    Anion Gap 18.0 (H) 7.0 - 16.0    Glucose 601 (HH) 65 - 99 mg/dL    Bun 14 8 - 22 mg/dL    Creatinine 0.81 0.50 - 1.40 mg/dL    Calcium 7.9 (L) 8.5 - 10.5 mg/dL    Correct Calcium 7.5 (L) 8.5 - 10.5 mg/dL    AST(SGOT) 11 (L) 12 - 45 U/L    ALT(SGPT) 8 2 - 50 U/L    Alkaline Phosphatase 83 30 - 99 U/L    Total Bilirubin 0.4 0.1 - 1.5 mg/dL    Albumin 4.5 3.2 - 4.9 g/dL    Total Protein 6.8 6.0 - 8.2 g/dL    Globulin 2.3 1.9 - 3.5 g/dL    A-G Ratio 2.0 g/dL   LIPASE   Result Value Ref Range    Lipase 14 11 - 82 U/L   ESTIMATED GFR   Result Value Ref Range    GFR (CKD-EPI) 113 >60 mL/min/1.73 m 2   POCT glucose device results   Result Value Ref Range    POC Glucose, Blood 562 (HH) 65 - 99 mg/dL          COURSE & MEDICAL DECISION  MAKING    INITIAL ASSESSMENT, COURSE AND PLAN  Care Narrative: Patient presents with symptoms of hyperglycemia.  He does not appear well.  He has been noncompliant with his insulin.  Ordered laboratory data.  He has a crusted infected skin lesion on the right shoulder actively draining with mild surrounding cellulitis.  Obtain culture of this exudate.  Treat with vancomycin.  Sepsis protocol ordered.  Ordered 2 L IV fluids.    Laboratory data with hyperglycemia.  He has a mild metabolic acidosis with gap.  Likely very mild DKA.  Treat with 10 units IV insulin.  Monitor.  No leukocytosis or left shift.    Patient will need to be admitted to hospital for diabetic ketoacidosis as well as right arm purulent cellulitis.  Hospitalist paged for admission.    I discussed case with Dr. Goodson.  He requested a beta hydroxybutyrate to determine if appropriate for hospitalist admission.  This was ordered.  Some delay on this result.  Was requested to consult night hospitalist for admission    D/w hospitalist        HYDRATION: Based on the patient's presentation of Hyperglycemia the patient was given IV fluids. IV Hydration was used because oral hydration was not as rapid as required. Upon recheck following hydration, the patient was stable.        DISPOSITION AND DISCUSSIONS  I have discussed management of the patient with the following physicians and MANNY's:  Dr. Bautista and Dr. FRAZIER, hospitalists    Discussion of management with other [facility] or appropriate source(s): Pharmacy for medications      Escalation of care considered, and ultimately not performed:diagnostic imaging      FINAL DIAGNOSIS  Diabetic ketoacidosis  2.   Purulent cellulitis, right shoulder       Electronically signed by: Amauri Godinez M.D., 1/8/2024 5:46 PM

## 2024-01-09 NOTE — CARE PLAN
The patient is Stable - Low risk of patient condition declining or worsening    Shift Goals  Clinical Goals: Monitor BG, Monitor Vitals  Patient Goals: Rest  Family Goals: JUANCARLOS    Progress made toward(s) clinical / shift goals:    Problem: Knowledge Deficit - Standard  Goal: Patient and family/care givers will demonstrate understanding of plan of care, disease process/condition, diagnostic tests and medications  Outcome: Progressing

## 2024-01-09 NOTE — PROGRESS NOTES
Admission request by ERP for soft DKA vs DKA. CMP glucose 601, anion gap 18, bicarb 19.    Recommended checking beta-hydroxybutyrate, if markedly elevated (>8.0) -- recommend reaching out to ICU attending for evaluation. Or alternatively, if appropriate, please contact medicine service for admission.

## 2024-01-09 NOTE — PROGRESS NOTES
4 Eyes Skin Assessment Completed by OMID Bear and JORI Castro    Head WDL  Ears WDL  Nose WDL  Mouth WDL  Neck WDL  Breast/Chest Scar  Shoulder Blades Redness and Blanching  Spine WDL  (R) Arm/Elbow/Hand Redness, Blanching, and Abrasion  (L) Arm/Elbow/Hand WDL  Abdomen Scar  Groin WDL  Scrotum/Coccyx/Buttocks WDL  (R) Leg Bruising  (L) Leg WDL  (R) Heel/Foot/Toe Redness, Blanching, Boggy, and Scab  (L) Heel/Foot/Toe Redness, Blanching, Boggy, and Scab          Devices In Places Tele Box and Pulse Ox      Interventions In Place Pillows    Possible Skin Injury No    Pictures Uploaded Into Epic N/A  Wound Consult Placed N/A  RN Wound Prevention Protocol Ordered No

## 2024-01-09 NOTE — DISCHARGE INSTRUCTIONS
Diet    Diabetic Diet     A Diabetic Diet can help you control your blood glucose, lower your risk of heart disease, improve your blood pressure and maintain a healthy weight.  Eating healthy foods, low in calories, fat and carbohydrates at the same time every day can help control your blood glucose. Carbohydrates can greatly affect your blood glucose levels.  Counting carbs is important to keep your blood glucose at a healthy level, especially if you use insulin or take certain oral diabetes medicines.  Avoid alcohol as it can cause a sudden decrease in blood glucose (hypoglycemia), especially if you use insulin or take certain oral diabetes medicines.    Activity    Resume Your Normal Activity    You may resume your normal activity as tolerated.  Rest as needed.         detailed exam

## 2024-01-09 NOTE — RESPIRATORY CARE
COPD EDUCATION by COPD CLINICAL EDUCATOR  1/9/2024 at 8:26 AM by Ashlie Zarco RRT     Patient reviewed by COPD education team. Patient does not have a history or diagnosis of COPD and is a non-smoker.  Therefore, patient does not qualify for the COPD program.

## 2024-01-09 NOTE — PROGRESS NOTES
Assumed care of patient after receiving report from Chema night shift RN. Patient is currently Alert and Oriented x4 on Room Air. Bed locked and in lowest position. Call light within reach.

## 2024-01-09 NOTE — PROGRESS NOTES
Monitor Summary  Rhythm: Sinus Bradycardia  Rate: 45-55  Ectopy: NA  .20 / .10 / .40

## 2024-01-09 NOTE — DISCHARGE PLANNING
Patient rolled back to observation/outpatient status per attending MD determination (Cristian Ponce MD) and UR committee MD secondary review (Trinity Terrell DO). Condition code 44 completed.

## 2024-01-09 NOTE — ASSESSMENT & PLAN NOTE
A dose of vancomycin given in ER  Will switch to p.o. Bactrim a cellulitis is not extensive  Wound care consult
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
## 2024-01-11 LAB
BACTERIA WND AEROBE CULT: ABNORMAL
BACTERIA WND AEROBE CULT: ABNORMAL
GRAM STN SPEC: ABNORMAL
SIGNIFICANT IND 70042: ABNORMAL
SITE SITE: ABNORMAL
SOURCE SOURCE: ABNORMAL

## 2024-01-12 ENCOUNTER — PATIENT OUTREACH (OUTPATIENT)
Dept: HEALTH INFORMATION MANAGEMENT | Facility: OTHER | Age: 44
End: 2024-01-12
Payer: MEDICAID

## 2024-01-13 LAB
BACTERIA BLD CULT: NORMAL
BACTERIA BLD CULT: NORMAL
SIGNIFICANT IND 70042: NORMAL
SIGNIFICANT IND 70042: NORMAL
SITE SITE: NORMAL
SITE SITE: NORMAL
SOURCE SOURCE: NORMAL
SOURCE SOURCE: NORMAL

## 2024-02-06 ENCOUNTER — APPOINTMENT (OUTPATIENT)
Dept: RADIOLOGY | Facility: MEDICAL CENTER | Age: 44
DRG: 638 | End: 2024-02-06
Attending: EMERGENCY MEDICINE
Payer: MEDICAID

## 2024-02-06 ENCOUNTER — HOSPITAL ENCOUNTER (INPATIENT)
Facility: MEDICAL CENTER | Age: 44
LOS: 3 days | DRG: 638 | End: 2024-02-09
Attending: EMERGENCY MEDICINE | Admitting: HOSPITALIST
Payer: MEDICAID

## 2024-02-06 PROBLEM — E87.8 ELECTROLYTE ABNORMALITY: Status: ACTIVE | Noted: 2024-02-06

## 2024-02-06 PROBLEM — E10.65 TYPE 1 DIABETES MELLITUS WITH HYPERGLYCEMIA (HCC): Status: ACTIVE | Noted: 2024-01-08

## 2024-02-06 PROBLEM — I63.9 CVA (CEREBRAL VASCULAR ACCIDENT) (HCC): Status: RESOLVED | Noted: 2019-12-27 | Resolved: 2024-02-06

## 2024-02-06 PROBLEM — L03.90 CELLULITIS: Status: ACTIVE | Noted: 2024-02-06

## 2024-02-06 LAB
ALBUMIN SERPL BCP-MCNC: 3.3 G/DL (ref 3.2–4.9)
ALBUMIN/GLOB SERPL: 1.7 G/DL
ALP SERPL-CCNC: 85 U/L (ref 30–99)
ALT SERPL-CCNC: 6 U/L (ref 2–50)
ANION GAP SERPL CALC-SCNC: 13 MMOL/L (ref 7–16)
AST SERPL-CCNC: 6 U/L (ref 12–45)
BASOPHILS # BLD AUTO: 0.8 % (ref 0–1.8)
BASOPHILS # BLD: 0.12 K/UL (ref 0–0.12)
BILIRUB SERPL-MCNC: 0.5 MG/DL (ref 0.1–1.5)
BUN SERPL-MCNC: 11 MG/DL (ref 8–22)
CALCIUM ALBUM COR SERPL-MCNC: 7 MG/DL (ref 8.5–10.5)
CALCIUM SERPL-MCNC: 6.4 MG/DL (ref 8.5–10.5)
CHLORIDE SERPL-SCNC: 110 MMOL/L (ref 96–112)
CO2 SERPL-SCNC: 15 MMOL/L (ref 20–33)
CREAT SERPL-MCNC: 0.58 MG/DL (ref 0.5–1.4)
EKG IMPRESSION: NORMAL
EOSINOPHIL # BLD AUTO: 0.09 K/UL (ref 0–0.51)
EOSINOPHIL NFR BLD: 0.6 % (ref 0–6.9)
ERYTHROCYTE [DISTWIDTH] IN BLOOD BY AUTOMATED COUNT: 41.8 FL (ref 35.9–50)
EST. AVERAGE GLUCOSE BLD GHB EST-MCNC: 352 MG/DL
GFR SERPLBLD CREATININE-BSD FMLA CKD-EPI: 125 ML/MIN/1.73 M 2
GLOBULIN SER CALC-MCNC: 2 G/DL (ref 1.9–3.5)
GLUCOSE BLD STRIP.AUTO-MCNC: 346 MG/DL (ref 65–99)
GLUCOSE SERPL-MCNC: 326 MG/DL (ref 65–99)
HBA1C MFR BLD: 13.9 % (ref 4–5.6)
HCT VFR BLD AUTO: 35.6 % (ref 42–52)
HGB BLD-MCNC: 12.5 G/DL (ref 14–18)
IMM GRANULOCYTES # BLD AUTO: 0.14 K/UL (ref 0–0.11)
IMM GRANULOCYTES NFR BLD AUTO: 1 % (ref 0–0.9)
LYMPHOCYTES # BLD AUTO: 3.8 K/UL (ref 1–4.8)
LYMPHOCYTES NFR BLD: 25.9 % (ref 22–41)
MCH RBC QN AUTO: 30.3 PG (ref 27–33)
MCHC RBC AUTO-ENTMCNC: 35.1 G/DL (ref 32.3–36.5)
MCV RBC AUTO: 86.2 FL (ref 81.4–97.8)
MONOCYTES # BLD AUTO: 1.23 K/UL (ref 0–0.85)
MONOCYTES NFR BLD AUTO: 8.4 % (ref 0–13.4)
NEUTROPHILS # BLD AUTO: 9.3 K/UL (ref 1.82–7.42)
NEUTROPHILS NFR BLD: 63.3 % (ref 44–72)
NRBC # BLD AUTO: 0 K/UL
NRBC BLD-RTO: 0 /100 WBC (ref 0–0.2)
PLATELET # BLD AUTO: 303 K/UL (ref 164–446)
PMV BLD AUTO: 10.7 FL (ref 9–12.9)
POTASSIUM SERPL-SCNC: 2.9 MMOL/L (ref 3.6–5.5)
PROT SERPL-MCNC: 5.3 G/DL (ref 6–8.2)
RBC # BLD AUTO: 4.13 M/UL (ref 4.7–6.1)
SODIUM SERPL-SCNC: 138 MMOL/L (ref 135–145)
TROPONIN T SERPL-MCNC: 12 NG/L (ref 6–19)
TROPONIN T SERPL-MCNC: 14 NG/L (ref 6–19)
WBC # BLD AUTO: 14.7 K/UL (ref 4.8–10.8)

## 2024-02-06 PROCEDURE — 96365 THER/PROPH/DIAG IV INF INIT: CPT

## 2024-02-06 PROCEDURE — 99223 1ST HOSP IP/OBS HIGH 75: CPT | Performed by: HOSPITALIST

## 2024-02-06 PROCEDURE — 71275 CT ANGIOGRAPHY CHEST: CPT

## 2024-02-06 PROCEDURE — 82962 GLUCOSE BLOOD TEST: CPT

## 2024-02-06 PROCEDURE — 96372 THER/PROPH/DIAG INJ SC/IM: CPT

## 2024-02-06 PROCEDURE — 80053 COMPREHEN METABOLIC PANEL: CPT

## 2024-02-06 PROCEDURE — 36415 COLL VENOUS BLD VENIPUNCTURE: CPT

## 2024-02-06 PROCEDURE — 96368 THER/DIAG CONCURRENT INF: CPT

## 2024-02-06 PROCEDURE — A9270 NON-COVERED ITEM OR SERVICE: HCPCS | Mod: UD | Performed by: EMERGENCY MEDICINE

## 2024-02-06 PROCEDURE — 99285 EMERGENCY DEPT VISIT HI MDM: CPT

## 2024-02-06 PROCEDURE — 85025 COMPLETE CBC W/AUTO DIFF WBC: CPT

## 2024-02-06 PROCEDURE — 82652 VIT D 1 25-DIHYDROXY: CPT

## 2024-02-06 PROCEDURE — 96367 TX/PROPH/DG ADDL SEQ IV INF: CPT

## 2024-02-06 PROCEDURE — 700117 HCHG RX CONTRAST REV CODE 255: Mod: UD | Performed by: EMERGENCY MEDICINE

## 2024-02-06 PROCEDURE — 700111 HCHG RX REV CODE 636 W/ 250 OVERRIDE (IP): Mod: JZ | Performed by: HOSPITALIST

## 2024-02-06 PROCEDURE — 71045 X-RAY EXAM CHEST 1 VIEW: CPT

## 2024-02-06 PROCEDURE — 700102 HCHG RX REV CODE 250 W/ 637 OVERRIDE(OP): Mod: UD | Performed by: EMERGENCY MEDICINE

## 2024-02-06 PROCEDURE — A9270 NON-COVERED ITEM OR SERVICE: HCPCS | Performed by: HOSPITALIST

## 2024-02-06 PROCEDURE — 700102 HCHG RX REV CODE 250 W/ 637 OVERRIDE(OP): Performed by: HOSPITALIST

## 2024-02-06 PROCEDURE — 84484 ASSAY OF TROPONIN QUANT: CPT

## 2024-02-06 PROCEDURE — 700111 HCHG RX REV CODE 636 W/ 250 OVERRIDE (IP): Mod: JZ,UD | Performed by: EMERGENCY MEDICINE

## 2024-02-06 PROCEDURE — 83036 HEMOGLOBIN GLYCOSYLATED A1C: CPT

## 2024-02-06 PROCEDURE — 770020 HCHG ROOM/CARE - TELE (206)

## 2024-02-06 PROCEDURE — 700105 HCHG RX REV CODE 258: Mod: UD | Performed by: EMERGENCY MEDICINE

## 2024-02-06 PROCEDURE — 93005 ELECTROCARDIOGRAM TRACING: CPT | Performed by: EMERGENCY MEDICINE

## 2024-02-06 PROCEDURE — 700105 HCHG RX REV CODE 258: Performed by: HOSPITALIST

## 2024-02-06 RX ORDER — PROMETHAZINE HYDROCHLORIDE 25 MG/1
12.5-25 SUPPOSITORY RECTAL EVERY 4 HOURS PRN
Status: DISCONTINUED | OUTPATIENT
Start: 2024-02-06 | End: 2024-02-09 | Stop reason: HOSPADM

## 2024-02-06 RX ORDER — PROMETHAZINE HYDROCHLORIDE 25 MG/1
12.5-25 TABLET ORAL EVERY 4 HOURS PRN
Status: DISCONTINUED | OUTPATIENT
Start: 2024-02-06 | End: 2024-02-09 | Stop reason: HOSPADM

## 2024-02-06 RX ORDER — POTASSIUM CHLORIDE 7.45 MG/ML
10 INJECTION INTRAVENOUS ONCE
Status: COMPLETED | OUTPATIENT
Start: 2024-02-06 | End: 2024-02-06

## 2024-02-06 RX ORDER — CALCIUM GLUCONATE 20 MG/ML
1 INJECTION, SOLUTION INTRAVENOUS ONCE
Status: COMPLETED | OUTPATIENT
Start: 2024-02-06 | End: 2024-02-06

## 2024-02-06 RX ORDER — SODIUM CHLORIDE, SODIUM LACTATE, POTASSIUM CHLORIDE, CALCIUM CHLORIDE 600; 310; 30; 20 MG/100ML; MG/100ML; MG/100ML; MG/100ML
1000 INJECTION, SOLUTION INTRAVENOUS ONCE
Status: COMPLETED | OUTPATIENT
Start: 2024-02-06 | End: 2024-02-06

## 2024-02-06 RX ORDER — PRAZOSIN HYDROCHLORIDE 5 MG/1
5 CAPSULE ORAL NIGHTLY
COMMUNITY

## 2024-02-06 RX ORDER — DEXTROSE MONOHYDRATE 25 G/50ML
25 INJECTION, SOLUTION INTRAVENOUS
Status: DISCONTINUED | OUTPATIENT
Start: 2024-02-06 | End: 2024-02-09 | Stop reason: HOSPADM

## 2024-02-06 RX ORDER — OXYCODONE HYDROCHLORIDE 5 MG/1
5 TABLET ORAL EVERY 4 HOURS PRN
Status: DISCONTINUED | OUTPATIENT
Start: 2024-02-06 | End: 2024-02-09 | Stop reason: HOSPADM

## 2024-02-06 RX ORDER — ACETAMINOPHEN 325 MG/1
650 TABLET ORAL EVERY 6 HOURS PRN
Status: DISCONTINUED | OUTPATIENT
Start: 2024-02-06 | End: 2024-02-09 | Stop reason: HOSPADM

## 2024-02-06 RX ORDER — LABETALOL HYDROCHLORIDE 5 MG/ML
10 INJECTION, SOLUTION INTRAVENOUS EVERY 4 HOURS PRN
Status: DISCONTINUED | OUTPATIENT
Start: 2024-02-06 | End: 2024-02-09 | Stop reason: HOSPADM

## 2024-02-06 RX ORDER — ONDANSETRON 2 MG/ML
4 INJECTION INTRAMUSCULAR; INTRAVENOUS EVERY 4 HOURS PRN
Status: DISCONTINUED | OUTPATIENT
Start: 2024-02-06 | End: 2024-02-09 | Stop reason: HOSPADM

## 2024-02-06 RX ORDER — POTASSIUM CHLORIDE 20 MEQ/1
40 TABLET, EXTENDED RELEASE ORAL ONCE
Status: COMPLETED | OUTPATIENT
Start: 2024-02-06 | End: 2024-02-06

## 2024-02-06 RX ORDER — ONDANSETRON 4 MG/1
4 TABLET, ORALLY DISINTEGRATING ORAL EVERY 4 HOURS PRN
Status: DISCONTINUED | OUTPATIENT
Start: 2024-02-06 | End: 2024-02-09 | Stop reason: HOSPADM

## 2024-02-06 RX ORDER — ASPIRIN 81 MG/1
81 TABLET ORAL DAILY
COMMUNITY

## 2024-02-06 RX ORDER — SODIUM CHLORIDE 9 MG/ML
1000 INJECTION, SOLUTION INTRAVENOUS ONCE
Status: COMPLETED | OUTPATIENT
Start: 2024-02-06 | End: 2024-02-06

## 2024-02-06 RX ORDER — VITAMIN B COMPLEX
1000 TABLET ORAL
COMMUNITY

## 2024-02-06 RX ORDER — PROCHLORPERAZINE EDISYLATE 5 MG/ML
5-10 INJECTION INTRAMUSCULAR; INTRAVENOUS EVERY 4 HOURS PRN
Status: DISCONTINUED | OUTPATIENT
Start: 2024-02-06 | End: 2024-02-09 | Stop reason: HOSPADM

## 2024-02-06 RX ADMIN — ACETAMINOPHEN 650 MG: 325 TABLET, FILM COATED ORAL at 23:05

## 2024-02-06 RX ADMIN — AMPICILLIN AND SULBACTAM 3 G: 1; 2 INJECTION, POWDER, FOR SOLUTION INTRAMUSCULAR; INTRAVENOUS at 16:28

## 2024-02-06 RX ADMIN — SODIUM CHLORIDE, POTASSIUM CHLORIDE, SODIUM LACTATE AND CALCIUM CHLORIDE 1000 ML: 600; 310; 30; 20 INJECTION, SOLUTION INTRAVENOUS at 17:41

## 2024-02-06 RX ADMIN — INSULIN HUMAN 10 UNITS: 100 INJECTION, SOLUTION PARENTERAL at 20:11

## 2024-02-06 RX ADMIN — POTASSIUM CHLORIDE 40 MEQ: 1500 TABLET, EXTENDED RELEASE ORAL at 20:13

## 2024-02-06 RX ADMIN — IOHEXOL 50 ML: 350 INJECTION, SOLUTION INTRAVENOUS at 18:30

## 2024-02-06 RX ADMIN — CALCIUM GLUCONATE 1 G: 20 INJECTION, SOLUTION INTRAVENOUS at 17:31

## 2024-02-06 RX ADMIN — POTASSIUM CHLORIDE 10 MEQ: 7.46 INJECTION, SOLUTION INTRAVENOUS at 17:39

## 2024-02-06 RX ADMIN — POTASSIUM CHLORIDE 40 MEQ: 1500 TABLET, EXTENDED RELEASE ORAL at 17:33

## 2024-02-06 RX ADMIN — SODIUM CHLORIDE 1000 ML: 9 INJECTION, SOLUTION INTRAVENOUS at 16:27

## 2024-02-06 RX ADMIN — AMPICILLIN AND SULBACTAM 3 G: 1; 2 INJECTION, POWDER, FOR SOLUTION INTRAMUSCULAR; INTRAVENOUS at 21:52

## 2024-02-06 ASSESSMENT — LIFESTYLE VARIABLES
DOES PATIENT WANT TO TALK TO SOMEONE ABOUT QUITTING: NO
EVER HAD A DRINK FIRST THING IN THE MORNING TO STEADY YOUR NERVES TO GET RID OF A HANGOVER: NO
EVER FELT BAD OR GUILTY ABOUT YOUR DRINKING: NO
ON A TYPICAL DAY WHEN YOU DRINK ALCOHOL HOW MANY DRINKS DO YOU HAVE: 2
ALCOHOL_USE: YES
HAVE YOU EVER FELT YOU SHOULD CUT DOWN ON YOUR DRINKING: YES
SUBSTANCE_ABUSE: 0
DOES PATIENT WANT TO STOP DRINKING: YES
TOTAL SCORE: 1
HAVE PEOPLE ANNOYED YOU BY CRITICIZING YOUR DRINKING: NO
CONSUMPTION TOTAL: POSITIVE
TOTAL SCORE: 1
AVERAGE NUMBER OF DAYS PER WEEK YOU HAVE A DRINK CONTAINING ALCOHOL: 7
TOTAL SCORE: 1
HOW MANY TIMES IN THE PAST YEAR HAVE YOU HAD 5 OR MORE DRINKS IN A DAY: 10

## 2024-02-06 ASSESSMENT — ENCOUNTER SYMPTOMS
BACK PAIN: 0
PND: 0
SINUS PAIN: 0
SORE THROAT: 0
DIZZINESS: 0
BLURRED VISION: 0
VOMITING: 0
TINGLING: 0
BRUISES/BLEEDS EASILY: 0
HEARTBURN: 0
POLYDIPSIA: 0
FLANK PAIN: 0
PHOTOPHOBIA: 0
NECK PAIN: 0
CLAUDICATION: 0
SHORTNESS OF BREATH: 0
TREMORS: 0
COUGH: 0
EYE PAIN: 0
WHEEZING: 0
ORTHOPNEA: 0
HEADACHES: 0
HEMOPTYSIS: 0
BLOOD IN STOOL: 0
CHILLS: 0
CONSTIPATION: 0
ABDOMINAL PAIN: 0
STRIDOR: 0
NAUSEA: 0
FEVER: 0
DIAPHORESIS: 0
HALLUCINATIONS: 0
DEPRESSION: 0
SPUTUM PRODUCTION: 0
WEAKNESS: 0
PALPITATIONS: 0
MYALGIAS: 0
DOUBLE VISION: 0
FALLS: 0
DIARRHEA: 0

## 2024-02-06 ASSESSMENT — COPD QUESTIONNAIRES
COPD SCREENING SCORE: 2
DURING THE PAST 4 WEEKS HOW MUCH DID YOU FEEL SHORT OF BREATH: NONE/LITTLE OF THE TIME
HAVE YOU SMOKED AT LEAST 100 CIGARETTES IN YOUR ENTIRE LIFE: YES
DO YOU EVER COUGH UP ANY MUCUS OR PHLEGM?: NO/ONLY WITH OCCASIONAL COLDS OR INFECTIONS

## 2024-02-06 ASSESSMENT — COGNITIVE AND FUNCTIONAL STATUS - GENERAL
CLIMB 3 TO 5 STEPS WITH RAILING: A LITTLE
MOBILITY SCORE: 20
MOVING FROM LYING ON BACK TO SITTING ON SIDE OF FLAT BED: A LITTLE
WALKING IN HOSPITAL ROOM: A LITTLE
STANDING UP FROM CHAIR USING ARMS: A LITTLE
DAILY ACTIVITIY SCORE: 24
SUGGESTED CMS G CODE MODIFIER MOBILITY: CJ
SUGGESTED CMS G CODE MODIFIER DAILY ACTIVITY: CH

## 2024-02-06 ASSESSMENT — PATIENT HEALTH QUESTIONNAIRE - PHQ9
4. FEELING TIRED OR HAVING LITTLE ENERGY: SEVERAL DAYS
6. FEELING BAD ABOUT YOURSELF - OR THAT YOU ARE A FAILURE OR HAVE LET YOURSELF OR YOUR FAMILY DOWN: NOT AL ALL
5. POOR APPETITE OR OVEREATING: SEVERAL DAYS
9. THOUGHTS THAT YOU WOULD BE BETTER OFF DEAD, OR OF HURTING YOURSELF: NOT AT ALL
1. LITTLE INTEREST OR PLEASURE IN DOING THINGS: NEARLY EVERY DAY
3. TROUBLE FALLING OR STAYING ASLEEP OR SLEEPING TOO MUCH: SEVERAL DAYS
SUM OF ALL RESPONSES TO PHQ QUESTIONS 1-9: 9
SUM OF ALL RESPONSES TO PHQ9 QUESTIONS 1 AND 2: 6
2. FEELING DOWN, DEPRESSED, IRRITABLE, OR HOPELESS: NEARLY EVERY DAY
7. TROUBLE CONCENTRATING ON THINGS, SUCH AS READING THE NEWSPAPER OR WATCHING TELEVISION: NOT AT ALL
8. MOVING OR SPEAKING SO SLOWLY THAT OTHER PEOPLE COULD HAVE NOTICED. OR THE OPPOSITE, BEING SO FIGETY OR RESTLESS THAT YOU HAVE BEEN MOVING AROUND A LOT MORE THAN USUAL: NOT AT ALL

## 2024-02-06 ASSESSMENT — PAIN DESCRIPTION - PAIN TYPE: TYPE: ACUTE PAIN

## 2024-02-06 ASSESSMENT — FIBROSIS 4 INDEX: FIB4 SCORE: 0.81

## 2024-02-07 PROBLEM — K59.09 OTHER CONSTIPATION: Status: ACTIVE | Noted: 2024-02-07

## 2024-02-07 LAB
ALBUMIN SERPL BCP-MCNC: 3.9 G/DL (ref 3.2–4.9)
ALBUMIN/GLOB SERPL: 1.4 G/DL
ALP SERPL-CCNC: 103 U/L (ref 30–99)
ALT SERPL-CCNC: 9 U/L (ref 2–50)
ANION GAP SERPL CALC-SCNC: 13 MMOL/L (ref 7–16)
AST SERPL-CCNC: 7 U/L (ref 12–45)
BASOPHILS # BLD AUTO: 0.7 % (ref 0–1.8)
BASOPHILS # BLD: 0.08 K/UL (ref 0–0.12)
BILIRUB SERPL-MCNC: 0.4 MG/DL (ref 0.1–1.5)
BUN SERPL-MCNC: 15 MG/DL (ref 8–22)
CA-I SERPL-SCNC: 1.1 MMOL/L (ref 1.1–1.3)
CALCIUM ALBUM COR SERPL-MCNC: 9.1 MG/DL (ref 8.5–10.5)
CALCIUM SERPL-MCNC: 9 MG/DL (ref 8.5–10.5)
CHLORIDE SERPL-SCNC: 96 MMOL/L (ref 96–112)
CO2 SERPL-SCNC: 21 MMOL/L (ref 20–33)
CREAT SERPL-MCNC: 0.78 MG/DL (ref 0.5–1.4)
EOSINOPHIL # BLD AUTO: 0.17 K/UL (ref 0–0.51)
EOSINOPHIL NFR BLD: 1.4 % (ref 0–6.9)
ERYTHROCYTE [DISTWIDTH] IN BLOOD BY AUTOMATED COUNT: 43.5 FL (ref 35.9–50)
GFR SERPLBLD CREATININE-BSD FMLA CKD-EPI: 114 ML/MIN/1.73 M 2
GLOBULIN SER CALC-MCNC: 2.7 G/DL (ref 1.9–3.5)
GLUCOSE BLD STRIP.AUTO-MCNC: 259 MG/DL (ref 65–99)
GLUCOSE BLD STRIP.AUTO-MCNC: 284 MG/DL (ref 65–99)
GLUCOSE BLD STRIP.AUTO-MCNC: 285 MG/DL (ref 65–99)
GLUCOSE BLD STRIP.AUTO-MCNC: 344 MG/DL (ref 65–99)
GLUCOSE SERPL-MCNC: 366 MG/DL (ref 65–99)
HCT VFR BLD AUTO: 32.4 % (ref 42–52)
HGB BLD-MCNC: 11 G/DL (ref 14–18)
IMM GRANULOCYTES # BLD AUTO: 0.1 K/UL (ref 0–0.11)
IMM GRANULOCYTES NFR BLD AUTO: 0.8 % (ref 0–0.9)
LYMPHOCYTES # BLD AUTO: 3.66 K/UL (ref 1–4.8)
LYMPHOCYTES NFR BLD: 30.8 % (ref 22–41)
MCH RBC QN AUTO: 30.2 PG (ref 27–33)
MCHC RBC AUTO-ENTMCNC: 34 G/DL (ref 32.3–36.5)
MCV RBC AUTO: 89 FL (ref 81.4–97.8)
MONOCYTES # BLD AUTO: 0.89 K/UL (ref 0–0.85)
MONOCYTES NFR BLD AUTO: 7.5 % (ref 0–13.4)
NEUTROPHILS # BLD AUTO: 7 K/UL (ref 1.82–7.42)
NEUTROPHILS NFR BLD: 58.8 % (ref 44–72)
NRBC # BLD AUTO: 0 K/UL
NRBC BLD-RTO: 0 /100 WBC (ref 0–0.2)
PLATELET # BLD AUTO: 245 K/UL (ref 164–446)
PMV BLD AUTO: 10.8 FL (ref 9–12.9)
POTASSIUM SERPL-SCNC: 4 MMOL/L (ref 3.6–5.5)
PROT SERPL-MCNC: 6.6 G/DL (ref 6–8.2)
PTH-INTACT SERPL-MCNC: 27.9 PG/ML (ref 14–72)
RBC # BLD AUTO: 3.64 M/UL (ref 4.7–6.1)
SODIUM SERPL-SCNC: 130 MMOL/L (ref 135–145)
WBC # BLD AUTO: 11.9 K/UL (ref 4.8–10.8)

## 2024-02-07 PROCEDURE — 99233 SBSQ HOSP IP/OBS HIGH 50: CPT | Mod: GC | Performed by: INTERNAL MEDICINE

## 2024-02-07 PROCEDURE — 80053 COMPREHEN METABOLIC PANEL: CPT

## 2024-02-07 PROCEDURE — 36415 COLL VENOUS BLD VENIPUNCTURE: CPT

## 2024-02-07 PROCEDURE — 85025 COMPLETE CBC W/AUTO DIFF WBC: CPT

## 2024-02-07 PROCEDURE — 700111 HCHG RX REV CODE 636 W/ 250 OVERRIDE (IP): Mod: JZ | Performed by: HOSPITALIST

## 2024-02-07 PROCEDURE — 97166 OT EVAL MOD COMPLEX 45 MIN: CPT

## 2024-02-07 PROCEDURE — 82330 ASSAY OF CALCIUM: CPT

## 2024-02-07 PROCEDURE — 87040 BLOOD CULTURE FOR BACTERIA: CPT | Mod: 91

## 2024-02-07 PROCEDURE — 83970 ASSAY OF PARATHORMONE: CPT

## 2024-02-07 PROCEDURE — 82962 GLUCOSE BLOOD TEST: CPT

## 2024-02-07 PROCEDURE — 700105 HCHG RX REV CODE 258: Performed by: HOSPITALIST

## 2024-02-07 PROCEDURE — A9270 NON-COVERED ITEM OR SERVICE: HCPCS

## 2024-02-07 PROCEDURE — 770020 HCHG ROOM/CARE - TELE (206)

## 2024-02-07 PROCEDURE — 700102 HCHG RX REV CODE 250 W/ 637 OVERRIDE(OP)

## 2024-02-07 RX ORDER — LEVOTHYROXINE SODIUM 0.2 MG/1
200 TABLET ORAL
Status: DISCONTINUED | OUTPATIENT
Start: 2024-02-07 | End: 2024-02-07

## 2024-02-07 RX ORDER — LURASIDONE HYDROCHLORIDE 80 MG/1
80 TABLET, FILM COATED ORAL
Status: DISCONTINUED | OUTPATIENT
Start: 2024-02-07 | End: 2024-02-09 | Stop reason: HOSPADM

## 2024-02-07 RX ORDER — TRAZODONE HYDROCHLORIDE 100 MG/1
50 TABLET ORAL NIGHTLY
Status: DISCONTINUED | OUTPATIENT
Start: 2024-02-07 | End: 2024-02-09 | Stop reason: HOSPADM

## 2024-02-07 RX ORDER — DIVALPROEX SODIUM 500 MG/1
500-1500 TABLET, DELAYED RELEASE ORAL 2 TIMES DAILY
Status: DISCONTINUED | OUTPATIENT
Start: 2024-02-07 | End: 2024-02-07

## 2024-02-07 RX ORDER — CHLORAL HYDRATE 500 MG
2000 CAPSULE ORAL DAILY
Status: DISCONTINUED | OUTPATIENT
Start: 2024-02-07 | End: 2024-02-09 | Stop reason: HOSPADM

## 2024-02-07 RX ORDER — AMOXICILLIN 250 MG
2 CAPSULE ORAL EVERY EVENING
Status: DISCONTINUED | OUTPATIENT
Start: 2024-02-07 | End: 2024-02-09 | Stop reason: HOSPADM

## 2024-02-07 RX ORDER — ICOSAPENT ETHYL 1000 MG/1
2 CAPSULE ORAL 2 TIMES DAILY
Status: DISCONTINUED | OUTPATIENT
Start: 2024-02-07 | End: 2024-02-07

## 2024-02-07 RX ORDER — LATANOPROST 50 UG/ML
1 SOLUTION/ DROPS OPHTHALMIC NIGHTLY
Status: DISCONTINUED | OUTPATIENT
Start: 2024-02-07 | End: 2024-02-09 | Stop reason: HOSPADM

## 2024-02-07 RX ORDER — VITAMIN B COMPLEX
1000 TABLET ORAL
Status: DISCONTINUED | OUTPATIENT
Start: 2024-02-07 | End: 2024-02-09 | Stop reason: HOSPADM

## 2024-02-07 RX ORDER — FENOFIBRATE 134 MG/1
134 CAPSULE ORAL DAILY
Status: DISCONTINUED | OUTPATIENT
Start: 2024-02-07 | End: 2024-02-09 | Stop reason: HOSPADM

## 2024-02-07 RX ORDER — LEVOTHYROXINE SODIUM 0.03 MG/1
25 TABLET ORAL
Status: DISCONTINUED | OUTPATIENT
Start: 2024-02-07 | End: 2024-02-07

## 2024-02-07 RX ORDER — POLYETHYLENE GLYCOL 3350 17 G/17G
1 POWDER, FOR SOLUTION ORAL DAILY
Status: DISCONTINUED | OUTPATIENT
Start: 2024-02-07 | End: 2024-02-09 | Stop reason: HOSPADM

## 2024-02-07 RX ORDER — PRAZOSIN HYDROCHLORIDE 5 MG/1
5 CAPSULE ORAL NIGHTLY
Status: DISCONTINUED | OUTPATIENT
Start: 2024-02-07 | End: 2024-02-09 | Stop reason: HOSPADM

## 2024-02-07 RX ORDER — DIVALPROEX SODIUM 500 MG/1
500 TABLET, DELAYED RELEASE ORAL EVERY MORNING
Status: DISCONTINUED | OUTPATIENT
Start: 2024-02-07 | End: 2024-02-09 | Stop reason: HOSPADM

## 2024-02-07 RX ORDER — DIVALPROEX SODIUM 500 MG/1
1500 TABLET, DELAYED RELEASE ORAL
Status: DISCONTINUED | OUTPATIENT
Start: 2024-02-07 | End: 2024-02-09 | Stop reason: HOSPADM

## 2024-02-07 RX ORDER — ASPIRIN 81 MG/1
81 TABLET ORAL DAILY
Status: DISCONTINUED | OUTPATIENT
Start: 2024-02-07 | End: 2024-02-09 | Stop reason: HOSPADM

## 2024-02-07 RX ADMIN — FENOFIBRATE 134 MG: 134 CAPSULE ORAL at 10:02

## 2024-02-07 RX ADMIN — PRAZOSIN HYDROCHLORIDE 5 MG: 5 CAPSULE ORAL at 20:33

## 2024-02-07 RX ADMIN — Medication 1000 UNITS: at 20:32

## 2024-02-07 RX ADMIN — OMEGA-3 FATTY ACIDS CAP 1000 MG 2000 MG: 1000 CAP at 10:02

## 2024-02-07 RX ADMIN — INSULIN GLARGINE-YFGN 15 UNITS: 100 INJECTION, SOLUTION SUBCUTANEOUS at 17:29

## 2024-02-07 RX ADMIN — INSULIN HUMAN 7 UNITS: 100 INJECTION, SOLUTION PARENTERAL at 20:29

## 2024-02-07 RX ADMIN — LEVOTHYROXINE SODIUM 225 MCG: 0.2 TABLET ORAL at 10:02

## 2024-02-07 RX ADMIN — LURASIDONE HYDROCHLORIDE 80 MG: 80 TABLET, FILM COATED ORAL at 17:29

## 2024-02-07 RX ADMIN — DIVALPROEX SODIUM 500 MG: 500 TABLET, DELAYED RELEASE ORAL at 10:02

## 2024-02-07 RX ADMIN — DOCUSATE SODIUM 50 MG AND SENNOSIDES 8.6 MG 2 TABLET: 8.6; 5 TABLET, FILM COATED ORAL at 17:29

## 2024-02-07 RX ADMIN — TRAZODONE HYDROCHLORIDE 50 MG: 100 TABLET ORAL at 20:33

## 2024-02-07 RX ADMIN — DIVALPROEX SODIUM 1500 MG: 500 TABLET, DELAYED RELEASE ORAL at 20:32

## 2024-02-07 RX ADMIN — AMPICILLIN AND SULBACTAM 3 G: 1; 2 INJECTION, POWDER, FOR SOLUTION INTRAMUSCULAR; INTRAVENOUS at 20:47

## 2024-02-07 RX ADMIN — ASPIRIN 81 MG: 81 TABLET, COATED ORAL at 10:01

## 2024-02-07 RX ADMIN — AMPICILLIN AND SULBACTAM 3 G: 1; 2 INJECTION, POWDER, FOR SOLUTION INTRAMUSCULAR; INTRAVENOUS at 10:03

## 2024-02-07 RX ADMIN — SERTRALINE HYDROCHLORIDE 150 MG: 50 TABLET ORAL at 10:02

## 2024-02-07 RX ADMIN — POLYETHYLENE GLYCOL 3350 1 PACKET: 17 POWDER, FOR SOLUTION ORAL at 08:40

## 2024-02-07 RX ADMIN — AMPICILLIN AND SULBACTAM 3 G: 1; 2 INJECTION, POWDER, FOR SOLUTION INTRAMUSCULAR; INTRAVENOUS at 18:10

## 2024-02-07 RX ADMIN — INSULIN HUMAN 10 UNITS: 100 INJECTION, SOLUTION PARENTERAL at 08:38

## 2024-02-07 RX ADMIN — INSULIN HUMAN 7 UNITS: 100 INJECTION, SOLUTION PARENTERAL at 14:07

## 2024-02-07 RX ADMIN — INSULIN HUMAN 7 UNITS: 100 INJECTION, SOLUTION PARENTERAL at 17:29

## 2024-02-07 RX ADMIN — AMPICILLIN AND SULBACTAM 3 G: 1; 2 INJECTION, POWDER, FOR SOLUTION INTRAMUSCULAR; INTRAVENOUS at 04:11

## 2024-02-07 RX ADMIN — LATANOPROST 1 DROP: 50 SOLUTION OPHTHALMIC at 20:32

## 2024-02-07 ASSESSMENT — COGNITIVE AND FUNCTIONAL STATUS - GENERAL
SUGGESTED CMS G CODE MODIFIER DAILY ACTIVITY: CH
DAILY ACTIVITIY SCORE: 24

## 2024-02-07 ASSESSMENT — ENCOUNTER SYMPTOMS
DIARRHEA: 0
DOUBLE VISION: 0
FEVER: 0
SORE THROAT: 0
NAUSEA: 0
HEARTBURN: 0
SHORTNESS OF BREATH: 0
CONSTIPATION: 0
MYALGIAS: 0
ABDOMINAL PAIN: 0
DIZZINESS: 0
BLURRED VISION: 0
VOMITING: 0
CHILLS: 0
HEADACHES: 0
COUGH: 0
SENSORY CHANGE: 0

## 2024-02-07 ASSESSMENT — FIBROSIS 4 INDEX: FIB4 SCORE: 0.33

## 2024-02-07 ASSESSMENT — ACTIVITIES OF DAILY LIVING (ADL): TOILETING: INDEPENDENT

## 2024-02-07 ASSESSMENT — PAIN DESCRIPTION - PAIN TYPE: TYPE: ACUTE PAIN

## 2024-02-07 NOTE — THERAPY
"Occupational Therapy   Initial Evaluation     Patient Name: Norm Prabhakar  Age:  43 y.o., Sex:  male  Medical Record #: [id number]  Today's Date: 2/7/2024     Precautions  Precautions: Fall Risk    Assessment  Patient is a 43 y.o. male who presented to the hospital with chest pain, rash and hyperglycemia. Pt found to have cellulitis. Pt with a h/o conversion disorder, type I diabetes, PTSD, asthma, and seizures. He was hospitalized in late October of 2023 with right-sided weakness but MRIs have been negative for stroke. Pt is receiving outpatient therapy to address right-sided weakness.    During OT eval, pt appears to be at his functional baseline. He completed self-feeding, dressing, grooming in stance and functional transfers without assist. Pt with limited ROM in his right shoulder related to pain. Pt is left hand dominant. Pt reports he has exercises from outpatient therapy that he can perform while he is in the hospital.            Plan  Patient will not be actively followed for occupational therapy services at this time, however may be seen if requested by physician for 1 more visit during this hospitalization to address any discharge or equipment needs.    Occupational Therapy Initial Treatment Plan   Duration: Discharge Needs Only       Discharge Recommendations: Recommend outpatient occupational therapy services to address higher level deficits (resume therapy for RUE)     Subjective    \"They haven't let me get out of bed. I have a bed alarm.\"     Objective       02/07/24 1453   Prior Living Situation   Prior Services Home With Outpatient Therapy  (was receiving outpatient PT for right UE and LE weakness per pt)   Housing / Facility 1 Story Apartment / Condo   Steps Into Home 0   Steps In Home 0   Bathroom Set up Bathtub / Shower Combination   Lives with - Patient's Self Care Capacity Unrelated Adult   Comments Pt reports he has a friend staying with him and she is caring for his dogs while he is in the " hospital   Prior Level of ADL Function   Self Feeding Independent   Grooming / Hygiene Independent   Bathing Independent   Dressing Independent   Toileting Independent   Prior Level of IADL Function   Medication Management Independent   Laundry Independent   Kitchen Mobility Independent   Home Management Independent   Shopping Independent   Prior Level Of Mobility Independent Without Device in Community;Independent Without Device in Home   Occupation (Pre-Hospital Vocational)   (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)   History of Falls   History of Falls No   Precautions   Precautions Fall Risk   Vitals   O2 Delivery Device None - Room Air   Pain 0 - 10 Group   Therapist Pain Assessment During Activity;Nurse Notified  (right axilla pain with attempts at shoulder AROM)   Cognition    Level of Consciousness Alert   Comments very pleasant and cooperative   Passive ROM Upper Body   Comments right shoulder limited by pain, pt unable to tolerate attempts at full R shoulder abduction   Active ROM Upper Body   Active ROM Upper Body  X   Dominant Hand Left   Comments Pt's right UE limited by pain in right axilla, pt reports he has been working with outpatient PT on this   Strength Upper Body   Upper Body Strength  X   Comments LUE 5/5, RUE with good  strength, unable to test right shoulder due to pain   Sensation Upper Body   Comments denied current numbness/tigling but reports he has had some in the past in his RUE   Coordination Upper Body   Coordination WDL   Balance Assessment   Sitting Balance (Static) Good   Sitting Balance (Dynamic) Good   Standing Balance (Static) Fair +   Standing Balance (Dynamic) Fair +   Weight Shift Sitting Good   Weight Shift Standing Fair   Comments no AD   Bed Mobility    Supine to Sit Modified Independent   Sit to Supine Modified Independent   Scooting Modified Independent   ADL Assessment   Eating Independent   Grooming Supervision;Standing  (wash hands at sink,  clean glasses)   Lower Body Dressing Supervision  (shoes)   Toileting   (declined)   6 Clicks Daily Activity Score 24   Functional Mobility   Sit to Stand Supervised   Bed, Chair, Wheelchair Transfer Supervised   Mobility walked in room without AD, no LOB noted   Comments Pt reports he has exercises from outpatient PT that he can perform while he's in the hospital, denies need for equipment   Visual Perception   Visual Perception  X   Visual Acuity Wears Corrective Lenses   Edema / Skin Assessment   Comments redness right upper arm and left chest   Education Group   Education Provided Role of Occupational Therapist   Role of Occupational Therapist Patient Response Patient;Acceptance;Explanation;Verbal Demonstration   Occupational Therapy Initial Treatment Plan    Duration Discharge Needs Only   Problem List   Problem List Decreased Upper Extremity AROM Right  (RUE pain and decreased AROM, baseline and has been working with out patient PT)

## 2024-02-07 NOTE — RESPIRATORY CARE
COPD EDUCATION by COPD CLINICAL EDUCATOR  2/7/2024 at 9:14 AM by Ashlie Zarco RRT     Patient reviewed by COPD education team. Patient does not have a history or diagnosis of COPD and is a non-smoker.  Therefore, patient does not qualify for the COPD program.

## 2024-02-07 NOTE — ED NOTES
Pt transported off unit on portable monitor with telemetry RN. Pt awake and breathing with even, unlabored breaths on RA. All belongings with Pt.

## 2024-02-07 NOTE — PROGRESS NOTES
4 Eyes Skin Assessment Completed by OMID Mcclelland and OMID Caldwell.    Head WDL  Ears WDL  Nose WDL  Mouth WDL  Neck WDL  Breast/Chest Redness and Rash  Shoulder Blades WDL  Spine WDL  (R) Arm/Elbow/Hand Redness and Rash  (L) Arm/Elbow/Hand WDL  Abdomen WDL  Groin WDL  Scrotum/Coccyx/Buttocks WDL  (R) Leg WDL  (L) Leg WDL  (R) Heel/Foot/Toe WDL  (L) Heel/Foot/Toe WDL          Devices In Places Tele Box      Interventions In Place N/A    Possible Skin Injury No    Pictures Uploaded Into Epic Yes  Wound Consult Placed N/A  RN Wound Prevention Protocol Ordered Yes

## 2024-02-07 NOTE — DOCUMENTATION QUERY
[facility]                                                                       Query Response Note      PATIENT:               HOLLY KIM  ACCT #:                  [id number]  MRN:                     [id number]  :                      1980  ADMIT DATE:       2024 3:53 PM  DISCH DATE:          RESPONDING  PROVIDER #:        [id number]           QUERY TEXT:    Documentation in the medical record indicates that this patient has a diagnosis of Diabetes Type 1 with hyperglycemia with an additional diagnosis of cellulitis of RUE.      Can you provide clarification if the above clinical findings are related or not related?    The patient's Clinical Indicators include:  Findings:  --Patient admitted for DM1 with hyperglycemia, cellulitis of RUE, hypocalcemia and hypokalemia  --Per H&P, ''I do not see any wounds in his axilla. He had an old shoulder wound after spider bite and looks like      is healing well without any drainage'' documented  --Per H&P, ''DM1 with hyperglycemia'' documented  --Labs on admission :  glucose 326, A1C 13.9  --Chest CT on  admission:  No evidence of pulmonary embolus. Bilateral non specific axillary      lymphadenopathy could be malignant vs. reactive. Increased density/edema in left chest wall, subcutaneous      tissue could indicate cellulitis    Treatment:  --Chest CT  --IV Unasyn  --Sliding scale insulin    Risk Factors:  --DM1 with hyperglycemia  --Current cellulitis of RUE  --Past hx of spider bite to shoulder    Thank you,  Jacinta Sequeira RN, BSN  Clinical   Connect via [employer]  Options provided:   -- Diabetes type 1 with hyperglycemia and cellulitis of RUE are related to each other   -- Diabetes type 1 with hyperglycemia and cellulitis of RUE are not related to each other   -- Other explanation, Please specify   -- Unable to determine      Query created by:  Jacinta Sequeira on 2/7/2024 6:16 AM    RESPONSE TEXT:    Diabetes type 1 with hyperglycemia and cellulitis of RUE are related to each other          Electronically signed by:  BILL RESTREPO MD 2/7/2024 12:08 PM

## 2024-02-07 NOTE — DIETARY
Nutrition Services: Nutrition Education Consult   Day 1 of admit.  Norm Prabhakar is a 43 y.o. male with admitting DX of Cellulitis [L03.90]    RD able to visit pt at bedside to provide diabetes nutrition education. RD discussed the plate method, carb servings and the food label, provided handout reinforcing topics discussed. Pt demonstrated some readiness and some evidence of learning. RD able to answer all questions to patient's satisfaction.     No other education needs identified at this time. Consider referral to outpatient nutrition services for continuation of education as indicated or per pt preferences.     Please re-consult RD as indicated.

## 2024-02-07 NOTE — ED NOTES
----- Message from MARINA Her sent at 8/30/2017  8:24 AM CDT -----  Please notify patient (or parent if applicable): Lab results showed negative strep culture.  Follow up with PCP if symptoms are continuing.   Bedside report received from off going RN: Gretchen, assumed care of patient.  POC discussed with patient. Call light within reach, all needs addressed at this time.       Fall risk interventions in place: Patient's personal possessions are with in their safe reach and Keep floor surfaces clean and dry (all applicable per [facility] Fall risk assessment)   Continuous monitoring: Cardiac Leads, Pulse Ox, or Blood Pressure  IVF/IV medications: Infusion per MAR (List Med(s)) LR bolus  Oxygen: Room Air  Bedside sitter: Not Applicable   Isolation: Not Applicable

## 2024-02-07 NOTE — ED NOTES
Pt given blankets and his laptop. Medicated per MAR. Call light within reach. Denies additional needs, currently on the laptop with his mom
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
## 2024-02-07 NOTE — ED PROVIDER NOTES
ED Provider Note    CHIEF COMPLAINT  Chief Complaint   Patient presents with    Chest Pain     Started yesterday while at PT.   324 ASA given by EMS. 2 nitro with no relief and 50 Fent with no relief to pain     Rash     R arm  L pec       EXTERNAL RECORDS REVIEWED  Inpatient Notes admitted to this facility 1/8/2024 for hyperglycemia and shoulder wound.  He was started on    HPI/ROS  LIMITATION TO HISTORY   Select: : None  OUTSIDE HISTORIAN(S):  None    Norm Prabhakar is a 43 y.o. male who presents with a chief complaint of left-sided chest pain with associated shortness of breath and nausea as well as a rash on his right arm and left chest that he noticed last night.  He has not tried any medication for his symptoms.  He admits to having diabetes and states that he is compliant with his medications.  He denies any fevers.  He denies a history of DVT or PE.  He is not anticoagulated.  He denies any leg swelling.  He has not had any recent long distance travel or surgeries.  He does not take exogenous hormones.  He drinks between 6 and 8 alcoholic beverages a week and denies any history of withdrawal seizures.  He denies drug use. He received nitro and fentanyl enroute via EMS without improvement in his symptoms.    PAST MEDICAL HISTORY   has a past medical history of Anxiety, ASTHMA, Bipolar 1 disorder ([facility]), Depression, Diabetes (HCC), Fall, Glaucoma, Glaucoma (1982), Hypothyroidism, Indigestion, Mental disorder, Murmur, Pneumonia, Psychiatric problem (2002), S/P thyroidectomy, Seizure (HCC) (2010), Seizure disorder ([facility]), and Unspecified disorder of thyroid.    SURGICAL HISTORY   has a past surgical history that includes eye surgery; thyroid lobectomy; and other.    FAMILY HISTORY  Family History   Problem Relation Age of Onset    Hypertension Mother     Heart Disease Mother     Lung Disease Mother     Stroke Maternal Grandmother        SOCIAL HISTORY  Social History     Tobacco Use    Smoking status: Former      "Current packs/day: 0.00     Types: Cigarettes, Cigars     Quit date: 4/1/2020     Years since quitting: 3.8    Smokeless tobacco: Never   Vaping Use    Vaping Use: Every day    Start date: 8/1/2023    Substances: Nicotine, THC, CBD, Flavoring    Devices: Disposable, Pre-filled or refillable cartridge, Pre-filled pod   Substance and Sexual Activity    Alcohol use: Not Currently     Comment: occasionally    Drug use: Not Currently     Types: Inhaled     Comment: marijuana every few days    Sexual activity: Not Currently       CURRENT MEDICATIONS  Home Medications       Reviewed by Gretchen Allen R.N. (Registered Nurse) on 02/06/24 at 1602  Med List Status: Partial     Medication Last Dose Status   divalproex (DEPAKOTE) 500 MG Tablet Delayed Response  Active   Fenofibrate 200 MG Cap  Active   glucose blood strip  Active   Icosapent Ethyl (VASCEPA) 1 g Cap  Active   insulin glargine (LANTUS SOLOSTAR) 100 UNIT/ML Solution Pen-injector injection  Active   insulin lispro 100 UNIT/ML SC SOPN injection PEN  Active   Insulin Pen Needle 32 G x 4 mm  Active   Lancets  Active   latanoprost (XALATAN) 0.005 % Solution  Active   levothyroxine (SYNTHROID) 200 MCG Tab  Active   levothyroxine (SYNTHROID) 25 MCG Tab  Active   loratadine (CLARITIN) 10 MG Tab  Active   lurasidone (LATUDA) 80 MG Tab  Active   sertraline (ZOLOFT) 100 MG Tab  Active   traZODone (DESYREL) 50 MG Tab  Active                    ALLERGIES  Allergies   Allergen Reactions    Abilify      \"Feeling tired, like I don't even know whats going on around me\"    Fish      Pt reports salmon causes him to be sick to his stomach  Not listed on MAR noted 2/3/2021      Geodon [Ziprasidone Hcl] Anaphylaxis     Anaphylaxis per patient    Aripiprazole      \"I became lethargic.\"    Hydroxyzine Itching     Pt will only state \"I feel itchy when I take it    Ziprasidone        PHYSICAL EXAM  VITAL SIGNS: /66   Pulse 86   Temp 36.8 °C (98.2 °F) (Temporal)   Resp 12   Ht " "1.981 m (6' 6\")   Wt 117 kg (257 lb 15 oz)   SpO2 95%   BMI 29.81 kg/m²    Physical Exam  Vitals and nursing note reviewed.   Constitutional:       Appearance: He is well-developed.   HENT:      Head: Normocephalic and atraumatic.   Eyes:      Extraocular Movements: Extraocular movements intact.      Pupils: Pupils are equal, round, and reactive to light.   Cardiovascular:      Rate and Rhythm: Regular rhythm. Tachycardia present.      Heart sounds: Normal heart sounds.   Pulmonary:      Effort: Pulmonary effort is normal. No tachypnea, accessory muscle usage or respiratory distress.      Breath sounds: Normal breath sounds. No stridor. No wheezing.   Abdominal:      Palpations: Abdomen is soft.      Tenderness: There is no abdominal tenderness.   Musculoskeletal:      Cervical back: Normal range of motion and neck supple.      Right lower leg: No edema.      Left lower leg: No edema.   Skin:     General: Skin is warm and dry.      Comments: Erythema and induration along the right upper extremity and left pectoral muscle with associated mild tenderness and warmth but no fluctuance, crepitus, or pain out of proportion to exam.   Neurological:      General: No focal deficit present.      Mental Status: He is alert and oriented to person, place, and time.   Psychiatric:         Mood and Affect: Mood normal.         Behavior: Behavior normal.       DIAGNOSTIC STUDIES / PROCEDURES  LABS  Results for orders placed or performed during the hospital encounter of 02/06/24   CBC with Differential   Result Value Ref Range    WBC 14.7 (H) 4.8 - 10.8 K/uL    RBC 4.13 (L) 4.70 - 6.10 M/uL    Hemoglobin 12.5 (L) 14.0 - 18.0 g/dL    Hematocrit 35.6 (L) 42.0 - 52.0 %    MCV 86.2 81.4 - 97.8 fL    MCH 30.3 27.0 - 33.0 pg    MCHC 35.1 32.3 - 36.5 g/dL    RDW 41.8 35.9 - 50.0 fL    Platelet Count 303 164 - 446 K/uL    MPV 10.7 9.0 - 12.9 fL    Neutrophils-Polys 63.30 44.00 - 72.00 %    Lymphocytes 25.90 22.00 - 41.00 %    Monocytes " 8.40 0.00 - 13.40 %    Eosinophils 0.60 0.00 - 6.90 %    Basophils 0.80 0.00 - 1.80 %    Immature Granulocytes 1.00 (H) 0.00 - 0.90 %    Nucleated RBC 0.00 0.00 - 0.20 /100 WBC    Neutrophils (Absolute) 9.30 (H) 1.82 - 7.42 K/uL    Lymphs (Absolute) 3.80 1.00 - 4.80 K/uL    Monos (Absolute) 1.23 (H) 0.00 - 0.85 K/uL    Eos (Absolute) 0.09 0.00 - 0.51 K/uL    Baso (Absolute) 0.12 0.00 - 0.12 K/uL    Immature Granulocytes (abs) 0.14 (H) 0.00 - 0.11 K/uL    NRBC (Absolute) 0.00 K/uL   Complete Metabolic Panel (CMP)   Result Value Ref Range    Sodium 138 135 - 145 mmol/L    Potassium 2.9 (L) 3.6 - 5.5 mmol/L    Chloride 110 96 - 112 mmol/L    Co2 15 (L) 20 - 33 mmol/L    Anion Gap 13.0 7.0 - 16.0    Glucose 326 (H) 65 - 99 mg/dL    Bun 11 8 - 22 mg/dL    Creatinine 0.58 0.50 - 1.40 mg/dL    Calcium 6.4 (LL) 8.5 - 10.5 mg/dL    Correct Calcium 7.0 (L) 8.5 - 10.5 mg/dL    AST(SGOT) 6 (L) 12 - 45 U/L    ALT(SGPT) 6 2 - 50 U/L    Alkaline Phosphatase 85 30 - 99 U/L    Total Bilirubin 0.5 0.1 - 1.5 mg/dL    Albumin 3.3 3.2 - 4.9 g/dL    Total Protein 5.3 (L) 6.0 - 8.2 g/dL    Globulin 2.0 1.9 - 3.5 g/dL    A-G Ratio 1.7 g/dL   Troponins NOW   Result Value Ref Range    Troponin T 12 6 - 19 ng/L   Troponins in two (2) hours   Result Value Ref Range    Troponin T 14 6 - 19 ng/L   ESTIMATED GFR   Result Value Ref Range    GFR (CKD-EPI) 125 >60 mL/min/1.73 m 2   EKG   Result Value Ref Range    Report       [facility]    Test Date:  2024  Pt Name:    HOLLY KIM                 Department: ER  MRN:        [id number]                      Room:       [facility]  Gender:     Male                         Technician: [id number]  :        1980                   Requested By:ER TRIAGE PROTOCOL  Order #:    [id number]                    Reading MD: Geovanni Hammer MD    Measurements  Intervals                                Axis  Rate:       86                           P:          37  HI:         165                           QRS:        36  QRSD:       117                          T:          17  QT:         360  QTc:        431    Interpretive Statements  Sinus rhythm  Nonspecific intraventricular conduction delay  Low voltage, extremity leads  Compared to ECG 10/27/2023 14:15:54  No significant changes  Electronically Signed On 02- 20:03:22 PST by Geovanni Hammer MD     POCT glucose device results   Result Value Ref Range    POC Glucose, Blood 346 (H) 65 - 99 mg/dL     RADIOLOGY  I have independently interpreted the diagnostic imaging associated with this visit and am waiting the final reading from the radiologist.   My preliminary interpretation is as follows: No PE  Radiologist interpretation:   CT-CTA CHEST PULMONARY ARTERY W/ RECONS   Final Result      1.  No evidence of pulmonary embolus.      2.  Bilateral nonspecific axillary lymphadenopathy could be malignant versus reactive.      3.  Increased density/edema in the left chest wall subcutaneous tissue could indicate cellulitis.            DX-CHEST-PORTABLE (1 VIEW)   Final Result      No acute abnormality of the chest.      EC-ECHOCARDIOGRAM COMPLETE W/O CONT    (Results Pending)     COURSE & MEDICAL DECISION MAKING    ED Observation Status? No; Patient does not meet criteria for ED Observation.     INITIAL ASSESSMENT, COURSE AND PLAN  Care Narrative: This is a 43 year old male with a h/o diabetes here with two issues; chest pain and obvious cellulitis over the left chest wall and right upper arm. There is a wound over the posterior right shoulder that appears to be the source of the right upper arm cellulitis and another scabbed lesion on the left chest that appears to be the source of the chest wall cellulitis. There is no fluctuance, crepitus, or pain out of proportion to the exam to suggest abscess or NSTI. His RUE compartments are soft and compressible and he is neurovascularly intact in the right arm. Regarding his chest pain, ddx includes, but  is not limited to, ACS, PE, viral syndrome, maddie/pericarditis, pleurisy, costochondritis.    He arrives AF with normal VS although his BP is in the low 100s so he will receive IV fluids. Appears well hydrated and non-toxic. Started on unasyn.    CBC demonstrates leukocytosis with neutrophilic predominance but no bandemia. CMP reveals hypokalemia to 2.9 which was repleted orally and via IV. CO2 is low at 15 but AG is normal. Glucose is elevated to 326. This does not appear to suggest DKA. Calcium is low at 6.4 and this was repleted via IV. Troponin is normal.    CXR is normal.    CTA chest performed given the pleuritic component to the chest pain. This did not reveal PE but bilateral axillary lymphadenopathy was noted and felt to be malignant vs reactive. I favor reactive given the obvious infection in both the right arm and left thorax. He will require hospitalization for additional workup and management. This was discussed with the hospitalist who concurs and he was admitted in guarded condition.    ADDITIONAL PROBLEM LIST  None  DISPOSITION AND DISCUSSIONS  I have discussed management of the patient with the following physicians and MANNY's:  Dr. Freire, hospitalist.    Discussion of management with other Q or appropriate source(s): None     Escalation of care considered, and ultimately not performed: N/A.    Barriers to care at this time, including but not limited to:  None .     Decision tools and prescription drugs considered including, but not limited to: HEART Score 2 .  FINAL DIAGNOSIS  Chest pain  Hypokalemia  Hypocalcemia  Hyperglycemia  Cellulitis     Electronically signed by: Geovanni Hammer M.D., 2/6/2024 4:05 PM

## 2024-02-07 NOTE — HOSPITAL COURSE
Norm Prabhakar is a 43 y.o. male who presented on 2/6/2024 with past medical history of conversion disorder, type I diabetes, PTSD, asthma, seizures who presented to the hospital with right arm cellulitis and chest pain. Patient reports a history of spider bite when he was living in [facility], and noticed a growing rash while doing physical therapy for weakness of right arm. Patient presented previously on 1/9 and was noted with the rash and was then sent home on augmentin.     Patient also reported history of chest pain on left side, around the area of growing rash, aggravated with movement and exertion, not relieved with nitroglycermine. EKG during admission was unremarkable for ischemia, with CTA, CXR, troponins being unremarkable.     Patient has been admitted for cellulitis with failed out patient augmentin therapy.

## 2024-02-07 NOTE — ED TRIAGE NOTES
Chief Complaint   Patient presents with    Chest Pain     Started yesterday while at PT.   324 ASA given by EMS. 2 nitro with no relief and 50 Fent with no relief to pain     Rash     R arm  L pec    High Blood Sugar     490 for EMS

## 2024-02-07 NOTE — ED NOTES
PT medicated per MAR.   Denies additional needs. Pt shopping on laptop and watching tv  denies additional needs

## 2024-02-07 NOTE — ED NOTES
Med rec updated and complete. Allergies reviewed. Confirmed name and date of birth.  Interviewed pt at bedside. Pt stated he is now using the latanoprost in both eyes.  Pt denies anticoagulant medications.  Last ASA dose 02/06/24.  No antibiotic use in last 30 days.        Home pharmacy  [facility]   [phone]

## 2024-02-07 NOTE — H&P
Hospital Medicine History & Physical Note    Date of Service  2/6/2024    Primary Care Physician  ANIKET Ellington    Consultants      Specialist Names:     Code Status  Full Code    Chief Complaint  Chief Complaint   Patient presents with    Chest Pain     Started yesterday while at PT.   324 ASA given by EMS. 2 nitro with no relief and 50 Fent with no relief to pain     Rash     R arm  L pec    High Blood Sugar     490 for EMS       History of Presenting Illness  Norm Prabhakar is a 43 y.o. male who presented 2/6/2024 with past medical history of conversion disorder, type I diabetes, PTSD, asthma, seizures who presents to the hospital with right arm cellulitis and chest pain.  He first saw the rash when he was at physical therapy for right arm weakness.  Patient chest pain started yesterday on the left side and radiates to the left neck.  Chest pain gets worse when he exerts himself, takes a deep breath or any movement.  The chest pain was not relieved by nitroglycerin.  Patient was last seen in the hospital on 1/9 where he presented with hyperglycemia and shoulder wound.  The wound started after a spider bite.  He now complains of a right axillary wound without any discharge or fluctuations.    EKG interpreted by me found normal sinus rhythm without ST segment changes  Chest x-ray interpreted by me found no acute pulmonary process  CT of the chest did not show any evidence of pulmonary embolism    I discussed the plan of care with patient.    Review of Systems  Review of Systems   Constitutional:  Negative for chills, diaphoresis, fever and malaise/fatigue.   HENT:  Negative for congestion, ear discharge, ear pain, hearing loss, nosebleeds, sinus pain, sore throat and tinnitus.    Eyes:  Negative for blurred vision, double vision, photophobia and pain.   Respiratory:  Negative for cough, hemoptysis, sputum production, shortness of breath, wheezing and stridor.    Cardiovascular:  Positive for chest  "pain. Negative for palpitations, orthopnea, claudication, leg swelling and PND.   Gastrointestinal:  Negative for abdominal pain, blood in stool, constipation, diarrhea, heartburn, melena, nausea and vomiting.   Genitourinary:  Negative for dysuria, flank pain, frequency, hematuria and urgency.   Musculoskeletal:  Negative for back pain, falls, joint pain, myalgias and neck pain.   Skin:  Positive for rash. Negative for itching.   Neurological:  Negative for dizziness, tingling, tremors, weakness and headaches.   Endo/Heme/Allergies:  Negative for environmental allergies and polydipsia. Does not bruise/bleed easily.   Psychiatric/Behavioral:  Negative for depression, hallucinations, substance abuse and suicidal ideas.        Past Medical History   has a past medical history of Anxiety, ASTHMA, Bipolar 1 disorder ([facility]), Depression, Diabetes ([facility]), Fall, Glaucoma, Glaucoma (1982), Hypothyroidism, Indigestion, Mental disorder, Murmur, Pneumonia, Psychiatric problem (2002), S/P thyroidectomy, Seizure ([facility]) (2010), Seizure disorder ([facility]), and Unspecified disorder of thyroid.    Surgical History   has a past surgical history that includes eye surgery; thyroid lobectomy; and other.     Family History  family history includes Heart Disease in his mother; Hypertension in his mother; Lung Disease in his mother; Stroke in his maternal grandmother.   Family history reviewed with patient. There is no family history that is pertinent to the chief complaint.     Social History   reports that he quit smoking about 3 years ago. His smoking use included cigarettes and cigars. He has never used smokeless tobacco. He reports that he does not currently use alcohol. He reports that he does not currently use drugs after having used the following drugs: Inhaled.    Allergies  Allergies   Allergen Reactions    Abilify      \"Feeling tired, like I don't even know whats going on around me\"    Fish      Pt reports salmon causes him to be sick " "to his stomach  Not listed on MAR noted 2/3/2021      Geodon [Ziprasidone Hcl] Anaphylaxis     Anaphylaxis per patient    Aripiprazole      \"I became lethargic.\"    Hydroxyzine Itching     Pt will only state \"I feel itchy when I take it    Ziprasidone        Medications  Prior to Admission Medications   Prescriptions Last Dose Informant Patient Reported? Taking?   Fenofibrate 200 MG Cap 2/6/2024 at 0800 Patient Yes No   Sig: Take 200 mg by mouth every day.   Icosapent Ethyl (VASCEPA) 1 g Cap 2/6/2024 at 0800 Patient Yes No   Sig: Take 2 g by mouth 2 times a day.   aspirin 81 MG EC tablet 2/6/2024 at 0800  Yes Yes   Sig: Take 81 mg by mouth every day.   divalproex (DEPAKOTE) 500 MG Tablet Delayed Response 2/6/2024 at 0800 Patient Yes No   Sig: Take 500-1,500 mg by mouth 2 times a day. 500 mg in AM and 1500 mg at night   insulin glargine (LANTUS SOLOSTAR) 100 UNIT/ML Solution Pen-injector injection 2/6/2024 at 0800 Patient No No   Sig: Inject 32 Units under the skin 2 times a day for 30 days.   insulin lispro 100 UNIT/ML SC SOPN injection PEN 2/6/2024 at 0800 Patient No No   Sig: Inject 2-9 Units under the skin 3 times a day before meals for 30 days. Take as per scale: 151-200: 2 u, 201-250: 3 u, 251-300: 5u, 301-350: 6 u, 351-400: 8 u, more than 400: 9 u.   latanoprost (XALATAN) 0.005 % Solution 2/5/2024 at 2000 Patient Yes No   Sig: Administer 1 Drop into both eyes every evening.   levothyroxine (SYNTHROID) 200 MCG Tab 2/6/2024 at 0700 Patient Yes No   Sig: Take 200 mcg by mouth every morning on an empty stomach. Total of 225 mcg once a day   levothyroxine (SYNTHROID) 25 MCG Tab 2/6/2024 at 0700 Patient Yes No   Sig: Take 25 mcg by mouth every morning on an empty stomach. Total of 225 mcg once a day   loratadine (CLARITIN) 10 MG Tab 2/6/2024 at 0800 Patient Yes No   Sig: Take 10 mg by mouth every day.   lurasidone (LATUDA) 80 MG Tab 2/5/2024 at 180 Patient Yes No   Sig: Take 80 mg by mouth with dinner.   prazosin " (MINIPRESS) 5 MG Cap 2/5/2024 at 2000 Patient Yes Yes   Sig: Take 5 mg by mouth every evening.   sertraline (ZOLOFT) 100 MG Tab 2/6/2024 at 0700 Patient Yes No   Sig: Take 150 mg by mouth every day.   traZODone (DESYREL) 50 MG Tab 2/5/2024 at 2000 Patient Yes No   Sig: Take 50 mg by mouth every evening. Indications: Trouble Sleeping   vitamin D3 (CHOLECALCIFEROL) 1000 Unit (25 mcg) Tab 2/5/2024 at 2000  Yes Yes   Sig: Take 1,000 Units by mouth at bedtime.      Facility-Administered Medications: None       Physical Exam  Temp:  [36.8 °C (98.2 °F)] 36.8 °C (98.2 °F)  Pulse:  [67-86] 69  Resp:  [12-18] 16  BP: (102-109)/(66-70) 103/70  SpO2:  [92 %-95 %] 95 %  Blood Pressure: 103/70   Temperature: 36.8 °C (98.2 °F)   Pulse: 69   Respiration: 16   Pulse Oximetry: 95 %       Physical Exam  Vitals and nursing note reviewed.   Constitutional:       General: He is not in acute distress.     Appearance: Normal appearance. He is not ill-appearing, toxic-appearing or diaphoretic.   HENT:      Head: Normocephalic and atraumatic.      Nose: No congestion or rhinorrhea.      Mouth/Throat:      Pharynx: No posterior oropharyngeal erythema.   Eyes:      General: No scleral icterus.        Right eye: No discharge.   Cardiovascular:      Rate and Rhythm: Normal rate and regular rhythm.      Pulses: Normal pulses.      Heart sounds: Normal heart sounds. No murmur heard.     No friction rub. No gallop.   Pulmonary:      Effort: Pulmonary effort is normal. No respiratory distress.      Breath sounds: Normal breath sounds. No stridor. No wheezing, rhonchi or rales.   Abdominal:      General: There is no distension.      Tenderness: There is no abdominal tenderness.   Musculoskeletal:         General: No swelling, tenderness, deformity or signs of injury.      Cervical back: Normal range of motion.      Right lower leg: No edema.      Left lower leg: No edema.   Skin:     Capillary Refill: Capillary refill takes more than 3 seconds.       "Coloration: Skin is not jaundiced or pale.      Findings: No bruising, erythema, lesion or rash.      Comments: Right arm cellulitic changes   Neurological:      General: No focal deficit present.      Mental Status: He is alert and oriented to person, place, and time.         Laboratory:  Recent Labs     02/06/24  1606   WBC 14.7*   RBC 4.13*   HEMOGLOBIN 12.5*   HEMATOCRIT 35.6*   MCV 86.2   MCH 30.3   MCHC 35.1   RDW 41.8   PLATELETCT 303   MPV 10.7     Recent Labs     02/06/24  1606   SODIUM 138   POTASSIUM 2.9*   CHLORIDE 110   CO2 15*   GLUCOSE 326*   BUN 11   CREATININE 0.58   CALCIUM 6.4*     Recent Labs     02/06/24  1606   ALTSGPT 6   ASTSGOT 6*   ALKPHOSPHAT 85   TBILIRUBIN 0.5   GLUCOSE 326*         No results for input(s): \"NTPROBNP\" in the last 72 hours.      Recent Labs     02/06/24  1606 02/06/24  1836   TROPONINT 12 14       Imaging:  CT-CTA CHEST PULMONARY ARTERY W/ RECONS   Final Result      1.  No evidence of pulmonary embolus.      2.  Bilateral nonspecific axillary lymphadenopathy could be malignant versus reactive.      3.  Increased density/edema in the left chest wall subcutaneous tissue could indicate cellulitis.            DX-CHEST-PORTABLE (1 VIEW)   Final Result      No acute abnormality of the chest.      EC-ECHOCARDIOGRAM COMPLETE W/O CONT    (Results Pending)       X-Ray:  I have personally reviewed the images and compared with prior images.  EKG:  I have personally reviewed the images and compared with prior images.    Assessment/Plan:  Justification for Admission Status  I anticipate this patient will require at least two midnights for appropriate medical management, necessitating inpatient admission because cellulitis    Patient will need a Telemetry bed on MEDICAL service .  The need is secondary to cellulitis.    * Cellulitis- (present on admission)  Assessment & Plan  Start IV Unasyn  I do not see any wounds in his axilla. He had an old shoulder wound after spider bite and looks " like is healing well without any drainage.  Monitor for progression    Electrolyte abnormality  Assessment & Plan  Presents with hypocalcemia and hypokalemia  Replaced through IV  Monitor telemetry    Type 1 diabetes mellitus with hyperglycemia (HCC)- (present on admission)  Assessment & Plan  Start on insulin sliding scale with serial Accu-Checks  HbA1c 11.4  Hypoglycemic protocol in place      Right hemiparesis (HCC)- (present on admission)  Assessment & Plan  Patient has had multiple MRIs of the brain that did not show any evidence of stroke        VTE prophylaxis: SCDs/TEDs

## 2024-02-07 NOTE — PROGRESS NOTES
[facility] Internal Medicine Daily Progress Note    Date of Service  2/7/2024    [facility] Team: R IM Blue Team   Attending: Jono Jones M.d.  Senior Resident: Dr. Pearson  Intern:  Dr. Corona  Contact Number: [phone]    Chief Complaint  Norm Prabhakar is a 43 y.o. male admitted 2/6/2024 with cellulitis of the left torso and right upper extremity secondary to spider bite.    Hospital Course  Norm Prabhakar is a 43 y.o. male who presented on 2/6/2024 with past medical history of conversion disorder, type I diabetes, PTSD, asthma, seizures who presented to the hospital with right arm cellulitis and chest pain. Patient reports a history of spider bite when he was living in [facility], and noticed a growing rash while doing physical therapy for weakness of right arm. Patient presented previously on 1/9 and was noted with the rash and was then sent home on augmentin.     Patient also reported history of chest pain on left side, around the area of growing rash, aggravated with movement and exertion, not relieved with nitroglycermine. EKG during admission was unremarkable for ischemia, with CTA, CXR, troponins being unremarkable.     Patient has been admitted for cellulitis with failed out patient augmentin therapy.     Interval Problem Update  NAEO  Patient reports no history of chest pain currently, denies shortness of breath, but reports soreness over areas of induration.   Denies nausea, vomiting, diarrhea.   Patient was noted with crusting wounds, both on Right upper extremity and left torso, both associated with discreet indurations associated with warmth and tenderness to palpation.     I have discussed this patient's plan of care and discharge plan at IDT rounds today with Case Management, Nursing, Nursing leadership, and other members of the IDT team.    Consultants/Specialty  No consults have been made during this admission    Code Status  Full Code    Disposition  The patient is not medically cleared for discharge  to home or a post-acute facility.    Pt/OT pending  I have placed the appropriate orders for post-discharge needs.    Review of Systems  Review of Systems   Constitutional:  Negative for chills, fever and malaise/fatigue.   HENT:  Negative for hearing loss and sore throat.    Eyes:  Negative for blurred vision and double vision.   Respiratory:  Negative for cough and shortness of breath.    Cardiovascular:  Negative for chest pain and leg swelling.   Gastrointestinal:  Negative for abdominal pain, constipation, diarrhea, heartburn, nausea and vomiting.   Genitourinary:  Negative for dysuria.   Musculoskeletal:  Negative for myalgias.   Skin:  Positive for rash. Negative for itching.   Neurological:  Negative for dizziness, sensory change and headaches.        Physical Exam  Temp:  [36.2 °C (97.2 °F)-36.8 °C (98.2 °F)] 36.2 °C (97.2 °F)  Pulse:  [57-86] 63  Resp:  [12-21] 18  BP: (102-127)/(66-82) 116/71  SpO2:  [91 %-97 %] 96 %    Physical Exam  Constitutional:       Appearance: He is obese.   HENT:      Head: Normocephalic and atraumatic.      Nose: Nose normal.      Mouth/Throat:      Mouth: Mucous membranes are dry.      Pharynx: Oropharynx is clear.   Eyes:      Extraocular Movements: Extraocular movements intact.      Pupils: Pupils are equal, round, and reactive to light.   Cardiovascular:      Rate and Rhythm: Normal rate and regular rhythm.   Pulmonary:      Effort: Pulmonary effort is normal.      Breath sounds: Normal breath sounds.   Abdominal:      General: Abdomen is flat. Bowel sounds are normal.      Palpations: Abdomen is soft.   Musculoskeletal:      Cervical back: Normal range of motion and neck supple.   Skin:     General: Skin is warm and dry.      Capillary Refill: Capillary refill takes less than 2 seconds.      Comments: Patient was noted with crusting wounds, both on Right upper extremity and left torso, both associated with discreet indurations associated with warmth and tenderness to  palpation.    Neurological:      Mental Status: He is alert and oriented to person, place, and time.         Fluids    Intake/Output Summary (Last 24 hours) at 2/7/2024 1030  Last data filed at 2/7/2024 0713  Gross per 24 hour   Intake 1223.7 ml   Output 1000 ml   Net 223.7 ml       Laboratory  Recent Labs     02/06/24  1606 02/07/24  0244   WBC 14.7* 11.9*   RBC 4.13* 3.64*   HEMOGLOBIN 12.5* 11.0*   HEMATOCRIT 35.6* 32.4*   MCV 86.2 89.0   MCH 30.3 30.2   MCHC 35.1 34.0   RDW 41.8 43.5   PLATELETCT 303 245   MPV 10.7 10.8     Recent Labs     02/06/24  1606 02/07/24  0244   SODIUM 138 130*   POTASSIUM 2.9* 4.0   CHLORIDE 110 96   CO2 15* 21   GLUCOSE 326* 366*   BUN 11 15   CREATININE 0.58 0.78   CALCIUM 6.4* 9.0                   Imaging  CT-CTA CHEST PULMONARY ARTERY W/ RECONS   Final Result      1.  No evidence of pulmonary embolus.      2.  Bilateral nonspecific axillary lymphadenopathy could be malignant versus reactive.      3.  Increased density/edema in the left chest wall subcutaneous tissue could indicate cellulitis.            DX-CHEST-PORTABLE (1 VIEW)   Final Result      No acute abnormality of the chest.      EC-ECHOCARDIOGRAM COMPLETE W/O CONT    (Results Pending)        Assessment/Plan  Problem Representation:    * Cellulitis- (present on admission)  Assessment & Plan  Likely secondary to spider bite sustained last month. Failed outpatient augmentin therapy. Patient was started on unasyn, however blood cultures were not colelcted, and therefore ordered on day one of unasyn. Patient's ulcer/wound non purulent, non suppurative, however has associated erythema, with warmth, and tenderness. Also noted with axially lymphadenopathy on Rt axilla. WBC WNL, lactic acid WNL.   Erythema demarcated on admission 2/6/24  -Continue IV Unasyn      Other constipation  Assessment & Plan  Patient reports last bowel movement 3 days ago, which is normal for patient. Patient denies history of abdominal pain, and reports  to be passing flatulence.   -Started bowel protocol    Type 1 diabetes mellitus with hyperglycemia (HCC)- (present on admission)  Assessment & Plan  Start on insulin sliding scale with serial Accu-Checks  HbA1c 11.4  Hypoglycemic protocol in place  Started patient on half dose glargine at morning, regardless of breakfast time.       Electrolyte abnormality  Assessment & Plan  Patient presented with hypocalcemia and hypokalemia and was appropriately repleted on admission.   -CTM electrolytes and replete as necessary  -Monitor telemetry    Seizure disorder (HCC)- (present on admission)  Assessment & Plan  Patient has been on prazosin, lurasidone, divalproex as out patient for psychiatric disorder, PTSD, and seizure disorder.   -To continue out patient medications    Right hemiparesis (HCC)- (present on admission)  Assessment & Plan  Patient has had multiple MRIs of the brain that did not show any evidence of stroke  Patient reports symptoms at baseline    Psychiatric problem- (present on admission)  Assessment & Plan  To continue out patient depakote.     Anxiety and depression- (present on admission)  Assessment & Plan  To continue out patient medication.          VTE prophylaxis: SCDs/TEDs    I have performed a physical exam and reviewed and updated ROS and Plan today (2/7/2024). In review of yesterday's note (2/6/2024), there are no changes except as documented above.

## 2024-02-07 NOTE — CARE PLAN
The patient is Watcher - Medium risk of patient condition declining or worsening    Shift Goals  Clinical Goals: IV abx, monitor BG, monitor vitals  Patient Goals: Rest and comfort    Progress made toward(s) clinical / shift goals:    Problem: Provide Safe Environment  Goal: Suicide environmental safety, protocols, policies, and practices will be implemented  Description: Target End Date:  resolve day 1    1.  Remove objects or personal belongings that may cause harm or injury to self or others  2.  Dietary tray modifications (paperware)  3.  Provide a safe environment  4.  Render close patient supervision by sustaining observation or awareness of the patient at all times  Outcome: Progressing       Patient is not progressing towards the following goals:      Problem: Psychosocial  Goal: Patient's ability to identify and develop effective coping behaviors will improve  Description: Target End Date:  1 to 3 days    1.  Present opportunities for the patient to express thoughts, and feelings in a nonjudgmental environment  2.  Help the patient with problem-solving in a constructive manner.  3.  Educate the patient on cognitive-behavioral self-management responses to suicidal thoughts.  4.  Introduce the use of self-expression methods to manage suicidal feelings  5.  Provide emotional support  6.  Encourage identification of positive aspects of self  Outcome: Not Met

## 2024-02-08 ENCOUNTER — APPOINTMENT (OUTPATIENT)
Dept: CARDIOLOGY | Facility: MEDICAL CENTER | Age: 44
DRG: 638 | End: 2024-02-08
Attending: HOSPITALIST
Payer: MEDICAID

## 2024-02-08 LAB
1,25(OH)2D3 SERPL-MCNC: 71.8 PG/ML (ref 19.9–79.3)
ALBUMIN SERPL BCP-MCNC: 3.8 G/DL (ref 3.2–4.9)
ALBUMIN/GLOB SERPL: 1.3 G/DL
ALP SERPL-CCNC: 100 U/L (ref 30–99)
ALT SERPL-CCNC: 5 U/L (ref 2–50)
ANION GAP SERPL CALC-SCNC: 15 MMOL/L (ref 7–16)
AST SERPL-CCNC: 10 U/L (ref 12–45)
BASOPHILS # BLD AUTO: 1 % (ref 0–1.8)
BASOPHILS # BLD: 0.09 K/UL (ref 0–0.12)
BILIRUB SERPL-MCNC: 0.2 MG/DL (ref 0.1–1.5)
BUN SERPL-MCNC: 21 MG/DL (ref 8–22)
CALCIUM ALBUM COR SERPL-MCNC: 9.5 MG/DL (ref 8.5–10.5)
CALCIUM SERPL-MCNC: 9.3 MG/DL (ref 8.5–10.5)
CHLORIDE SERPL-SCNC: 97 MMOL/L (ref 96–112)
CO2 SERPL-SCNC: 20 MMOL/L (ref 20–33)
CREAT SERPL-MCNC: 0.87 MG/DL (ref 0.5–1.4)
EOSINOPHIL # BLD AUTO: 0.15 K/UL (ref 0–0.51)
EOSINOPHIL NFR BLD: 1.7 % (ref 0–6.9)
ERYTHROCYTE [DISTWIDTH] IN BLOOD BY AUTOMATED COUNT: 42.5 FL (ref 35.9–50)
GFR SERPLBLD CREATININE-BSD FMLA CKD-EPI: 111 ML/MIN/1.73 M 2
GLOBULIN SER CALC-MCNC: 3 G/DL (ref 1.9–3.5)
GLUCOSE BLD STRIP.AUTO-MCNC: 204 MG/DL (ref 65–99)
GLUCOSE BLD STRIP.AUTO-MCNC: 277 MG/DL (ref 65–99)
GLUCOSE BLD STRIP.AUTO-MCNC: 295 MG/DL (ref 65–99)
GLUCOSE BLD STRIP.AUTO-MCNC: 315 MG/DL (ref 65–99)
GLUCOSE BLD STRIP.AUTO-MCNC: 388 MG/DL (ref 65–99)
GLUCOSE SERPL-MCNC: 342 MG/DL (ref 65–99)
HCT VFR BLD AUTO: 31.6 % (ref 42–52)
HGB BLD-MCNC: 10.9 G/DL (ref 14–18)
IMM GRANULOCYTES # BLD AUTO: 0.1 K/UL (ref 0–0.11)
IMM GRANULOCYTES NFR BLD AUTO: 1.2 % (ref 0–0.9)
LV EJECT FRACT  99904: 55
LV EJECT FRACT MOD 2C 99903: 58.41
LV EJECT FRACT MOD 4C 99902: 65.66
LV EJECT FRACT MOD BP 99901: 61.69
LYMPHOCYTES # BLD AUTO: 3.79 K/UL (ref 1–4.8)
LYMPHOCYTES NFR BLD: 43.8 % (ref 22–41)
MCH RBC QN AUTO: 30.2 PG (ref 27–33)
MCHC RBC AUTO-ENTMCNC: 34.5 G/DL (ref 32.3–36.5)
MCV RBC AUTO: 87.5 FL (ref 81.4–97.8)
MONOCYTES # BLD AUTO: 0.53 K/UL (ref 0–0.85)
MONOCYTES NFR BLD AUTO: 6.1 % (ref 0–13.4)
NEUTROPHILS # BLD AUTO: 4 K/UL (ref 1.82–7.42)
NEUTROPHILS NFR BLD: 46.2 % (ref 44–72)
NRBC # BLD AUTO: 0 K/UL
NRBC BLD-RTO: 0 /100 WBC (ref 0–0.2)
PHOSPHATE SERPL-MCNC: 4.2 MG/DL (ref 2.5–4.5)
PLATELET # BLD AUTO: 216 K/UL (ref 164–446)
PMV BLD AUTO: 10.3 FL (ref 9–12.9)
POTASSIUM SERPL-SCNC: 4.1 MMOL/L (ref 3.6–5.5)
PROT SERPL-MCNC: 6.8 G/DL (ref 6–8.2)
RBC # BLD AUTO: 3.61 M/UL (ref 4.7–6.1)
SODIUM SERPL-SCNC: 132 MMOL/L (ref 135–145)
WBC # BLD AUTO: 8.7 K/UL (ref 4.8–10.8)

## 2024-02-08 PROCEDURE — 700105 HCHG RX REV CODE 258: Performed by: HOSPITALIST

## 2024-02-08 PROCEDURE — 93306 TTE W/DOPPLER COMPLETE: CPT | Mod: 26 | Performed by: INTERNAL MEDICINE

## 2024-02-08 PROCEDURE — 97535 SELF CARE MNGMENT TRAINING: CPT

## 2024-02-08 PROCEDURE — 80053 COMPREHEN METABOLIC PANEL: CPT

## 2024-02-08 PROCEDURE — 770020 HCHG ROOM/CARE - TELE (206)

## 2024-02-08 PROCEDURE — 93306 TTE W/DOPPLER COMPLETE: CPT

## 2024-02-08 PROCEDURE — 97162 PT EVAL MOD COMPLEX 30 MIN: CPT

## 2024-02-08 PROCEDURE — 99232 SBSQ HOSP IP/OBS MODERATE 35: CPT | Mod: GC | Performed by: INTERNAL MEDICINE

## 2024-02-08 PROCEDURE — 84100 ASSAY OF PHOSPHORUS: CPT

## 2024-02-08 PROCEDURE — A9270 NON-COVERED ITEM OR SERVICE: HCPCS | Performed by: HOSPITALIST

## 2024-02-08 PROCEDURE — 85025 COMPLETE CBC W/AUTO DIFF WBC: CPT

## 2024-02-08 PROCEDURE — A9270 NON-COVERED ITEM OR SERVICE: HCPCS

## 2024-02-08 PROCEDURE — 700102 HCHG RX REV CODE 250 W/ 637 OVERRIDE(OP): Performed by: HOSPITALIST

## 2024-02-08 PROCEDURE — 700111 HCHG RX REV CODE 636 W/ 250 OVERRIDE (IP): Mod: JZ | Performed by: HOSPITALIST

## 2024-02-08 PROCEDURE — 82962 GLUCOSE BLOOD TEST: CPT | Mod: 91

## 2024-02-08 PROCEDURE — 700102 HCHG RX REV CODE 250 W/ 637 OVERRIDE(OP)

## 2024-02-08 RX ADMIN — AMPICILLIN AND SULBACTAM 3 G: 1; 2 INJECTION, POWDER, FOR SOLUTION INTRAMUSCULAR; INTRAVENOUS at 10:35

## 2024-02-08 RX ADMIN — OXYCODONE 5 MG: 5 TABLET ORAL at 08:08

## 2024-02-08 RX ADMIN — SERTRALINE HYDROCHLORIDE 150 MG: 50 TABLET ORAL at 05:02

## 2024-02-08 RX ADMIN — INSULIN HUMAN 4 UNITS: 100 INJECTION, SOLUTION PARENTERAL at 20:25

## 2024-02-08 RX ADMIN — INSULIN HUMAN 12 UNITS: 100 INJECTION, SOLUTION PARENTERAL at 12:06

## 2024-02-08 RX ADMIN — AMPICILLIN AND SULBACTAM 3 G: 1; 2 INJECTION, POWDER, FOR SOLUTION INTRAMUSCULAR; INTRAVENOUS at 04:13

## 2024-02-08 RX ADMIN — DIVALPROEX SODIUM 500 MG: 500 TABLET, DELAYED RELEASE ORAL at 05:02

## 2024-02-08 RX ADMIN — INSULIN HUMAN 10 UNITS: 100 INJECTION, SOLUTION PARENTERAL at 09:30

## 2024-02-08 RX ADMIN — ASPIRIN 81 MG: 81 TABLET, COATED ORAL at 05:02

## 2024-02-08 RX ADMIN — PRAZOSIN HYDROCHLORIDE 5 MG: 5 CAPSULE ORAL at 20:18

## 2024-02-08 RX ADMIN — OXYCODONE 5 MG: 5 TABLET ORAL at 14:58

## 2024-02-08 RX ADMIN — Medication 1000 UNITS: at 20:16

## 2024-02-08 RX ADMIN — LATANOPROST 1 DROP: 50 SOLUTION OPHTHALMIC at 20:16

## 2024-02-08 RX ADMIN — LEVOTHYROXINE SODIUM 225 MCG: 0.2 TABLET ORAL at 05:02

## 2024-02-08 RX ADMIN — LURASIDONE HYDROCHLORIDE 80 MG: 80 TABLET, FILM COATED ORAL at 16:15

## 2024-02-08 RX ADMIN — DOCUSATE SODIUM 50 MG AND SENNOSIDES 8.6 MG 2 TABLET: 8.6; 5 TABLET, FILM COATED ORAL at 16:14

## 2024-02-08 RX ADMIN — INSULIN HUMAN 7 UNITS: 100 INJECTION, SOLUTION PARENTERAL at 17:02

## 2024-02-08 RX ADMIN — AMPICILLIN AND SULBACTAM 3 G: 1; 2 INJECTION, POWDER, FOR SOLUTION INTRAMUSCULAR; INTRAVENOUS at 21:28

## 2024-02-08 RX ADMIN — INSULIN GLARGINE-YFGN 15 UNITS: 100 INJECTION, SOLUTION SUBCUTANEOUS at 04:57

## 2024-02-08 RX ADMIN — AMPICILLIN AND SULBACTAM 3 G: 1; 2 INJECTION, POWDER, FOR SOLUTION INTRAMUSCULAR; INTRAVENOUS at 16:17

## 2024-02-08 RX ADMIN — TRAZODONE HYDROCHLORIDE 50 MG: 100 TABLET ORAL at 20:17

## 2024-02-08 RX ADMIN — DIVALPROEX SODIUM 1500 MG: 500 TABLET, DELAYED RELEASE ORAL at 20:17

## 2024-02-08 RX ADMIN — OMEGA-3 FATTY ACIDS CAP 1000 MG 2000 MG: 1000 CAP at 05:02

## 2024-02-08 RX ADMIN — FENOFIBRATE 134 MG: 134 CAPSULE ORAL at 05:02

## 2024-02-08 ASSESSMENT — ENCOUNTER SYMPTOMS
BLURRED VISION: 0
HEARTBURN: 0
SENSORY CHANGE: 0
CHILLS: 0
DOUBLE VISION: 0
HEADACHES: 0
MYALGIAS: 0
DIARRHEA: 0
SORE THROAT: 0
CONSTIPATION: 0
NAUSEA: 0
ABDOMINAL PAIN: 0
SHORTNESS OF BREATH: 0
COUGH: 0
VOMITING: 0
FEVER: 0
DIZZINESS: 0

## 2024-02-08 ASSESSMENT — COGNITIVE AND FUNCTIONAL STATUS - GENERAL
MOVING FROM LYING ON BACK TO SITTING ON SIDE OF FLAT BED: A LITTLE
MOBILITY SCORE: 20
STANDING UP FROM CHAIR USING ARMS: A LITTLE
SUGGESTED CMS G CODE MODIFIER MOBILITY: CJ
WALKING IN HOSPITAL ROOM: A LITTLE
CLIMB 3 TO 5 STEPS WITH RAILING: A LITTLE

## 2024-02-08 ASSESSMENT — PAIN DESCRIPTION - PAIN TYPE
TYPE: ACUTE PAIN

## 2024-02-08 ASSESSMENT — GAIT ASSESSMENTS
DEVIATION: DECREASED BASE OF SUPPORT;BRADYKINETIC;SHUFFLED GAIT
GAIT LEVEL OF ASSIST: STANDBY ASSIST
DISTANCE (FEET): 80

## 2024-02-08 NOTE — DOCUMENTATION QUERY
[facility]                                                                       Query Response Note      PATIENT:               HOLLY KIM  ACCT #:                  [id number]  MRN:                     [id number]  :                      1980  ADMIT DATE:       2024 3:53 PM  DISCH DATE:          RESPONDING  PROVIDER #:        [id number]           QUERY TEXT:    Sodium lab values of 130 and 132 are noted in the Medical Record.      Can a diagnosis be specified to support this finding?    The patient's Clinical Indicators include:  Findings:  --Patient admitted for cellulitis of RUE, DM1 with hyperglycemia, hypocalcemia and hypokalemia  --Na on admission :  138  --Na on :  130  --Na on :  132    Treatment:  --Daily labs including lytes    Risk Factors:  --DM1 with hyperglycemia  --Cellulitis of RUE    Thank you,  Jacinta Sequeira RN, BSN  Clinical    Connect via [employer]  Options provided:   -- Hyponatremia   -- Findings are clinically insignificant   -- Other explanation, (please specify other explanation)      Query created by: Jacinta Sequeira on 2024 8:50 AM    RESPONSE TEXT:    Hyponatremia          Electronically signed by:  BILL RESTREPO MD 2024 11:32 AM
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
## 2024-02-08 NOTE — PROGRESS NOTES
Diabetes education: Met with pt this afternoon. Please see consult note.  Plan: CDE to continue to follow. Pt states he has a meter, strips, insulin and pen needles at home.

## 2024-02-08 NOTE — CARE PLAN
The patient is Stable - Low risk of patient condition declining or worsening    Shift Goals  Clinical Goals: Monitor blood sugar, IV abx  Patient Goals: Rest and comfort    Progress made toward(s) clinical / shift goals:    Problem: Knowledge Deficit - Standard  Goal: Patient and family/care givers will demonstrate understanding of plan of care, disease process/condition, diagnostic tests and medications  Description: Target End Date:  1-3 days or as soon as patient condition allows    Document in Patient Education    1.  Patient and family/caregiver oriented to unit, equipment, visitation policy and means for communicating concern  2.  Complete/review Learning Assessment  3.  Assess knowledge level of disease process/condition, treatment plan, diagnostic tests and medications  4.  Explain disease process/condition, treatment plan, diagnostic tests and medications  Outcome: Progressing     Problem: Psychosocial  Goal: Patient's ability to identify and develop effective coping behaviors will improve  Description: Target End Date:  1 to 3 days    1.  Present opportunities for the patient to express thoughts, and feelings in a nonjudgmental environment  2.  Help the patient with problem-solving in a constructive manner.  3.  Educate the patient on cognitive-behavioral self-management responses to suicidal thoughts.  4.  Introduce the use of self-expression methods to manage suicidal feelings  5.  Provide emotional support  6.  Encourage identification of positive aspects of self  Outcome: Progressing     Problem: Pain - Standard  Goal: Alleviation of pain or a reduction in pain to the patient’s comfort goal  Description: Target End Date:  Prior to discharge or change in level of care    Document on Vitals flowsheet    1.  Document pain using the appropriate pain scale per order or unit policy  2.  Educate and implement non-pharmacologic comfort measures (i.e. relaxation, distraction, massage, cold/heat therapy,  etc.)  3.  Pain management medications as ordered  4.  Reassess pain after pain med administration per policy  5.  If opiods administered assess patient's response to pain medication is appropriate per POSS sedation scale  6.  Follow pain management plan developed in collaboration with patient and interdisciplinary team (including palliative care or pain specialists if applicable)  Outcome: Progressing       Patient is not progressing towards the following goals:

## 2024-02-08 NOTE — CONSULTS
Diabetes education: Pt has a hx of diabetes,on insulin. Pt was admitted with blood sugar of 326, and Hga1c of  13.9%. Pt is currently on glargine 15 units every 12 hours, with regular insulin per sliding scale coverage ac and hs. Blood sugars are 344 ( 10 units), 284 ( 7 units), and 285 (7 units). Pt did not get his am dose of Glargine. First dose was given at  1729 today.  Met with pt this afternoon. Reviewed insulin, storage, shelf life and site rotation. Pt was using only his arms for his insulin as using his abdomen he got a bruise. Discussed current pen could be at room temp and need to rotate sites. Handout given.  Pt practiced with saline pens and practice device.  Discussed frequency of testing and goals for blood sugars.  Plan: CDE to continue to follow. Pt states he has a meter, strips, insulin and pen needles at home.

## 2024-02-08 NOTE — PROGRESS NOTES
Assumed care of patient, received bedside report from Desirae ANNE. Patient is A&O X 4. Pain is 0/10, on room air. On tele monitor, SR. POC discussed with patient. Patient verbalized understanding. All questions answered. Call light within reach and fall precautions in place. Bed locked and in lowest position.

## 2024-02-08 NOTE — ASSESSMENT & PLAN NOTE
Likely secondary to spider bite sustained last month. Failed outpatient augmentin therapy. Patient was started on unasyn, however blood cultures were not colelcted, and therefore ordered on day one of unasyn. Patient's ulcer/wound non purulent, non suppurative, however has associated erythema, with warmth, and tenderness. Also noted with axially lymphadenopathy on Rt axilla. WBC WNL, lactic acid WNL.   Erythema demarcated on admission 2/6/24  -Continue IV Unasyn    
Patient has been on prazosin, lurasidone, divalproex as out patient for psychiatric disorder, PTSD, and seizure disorder.   -To continue out patient medications  
Patient has had multiple MRIs of the brain that did not show any evidence of stroke  Patient reports symptoms at baseline  
Patient noted with hyponatremia, in setting of type 1 diabetes and cellulitis secondary to spider bites.   -CTM  -Daily labs  
Patient presented with hypocalcemia and hypokalemia and was appropriately repleted on admission.   -CTM electrolytes and replete as necessary  -Monitor telemetry  
Patient reports last bowel movement 3 days ago, which is normal for patient. Patient denies history of abdominal pain, and reports to be passing flatulence.   -Started bowel protocol  
Start on insulin sliding scale with serial Accu-Checks  HbA1c 11.4  Hypoglycemic protocol in place  Started patient on half dose glargine at morning, regardless of breakfast time.     
To continue out patient depakote.   
To continue out patient medication.   
no

## 2024-02-08 NOTE — PROGRESS NOTES
[facility] Internal Medicine Daily Progress Note    Date of Service  2/8/2024    [facility] Team: R IM Blue Team   Attending: Jono Jones M.d.  Senior Resident: Dr. Pearson  Intern:  Dr. Corona  Contact Number: [phone]    Chief Complaint  Norm Prabhakar is a 43 y.o. male admitted 2/6/2024 with cellulitis of the left torso and right upper extremity secondary to spider bite.    Hospital Course  Norm Prabhakar is a 43 y.o. male who presented on 2/6/2024 with past medical history of conversion disorder, type I diabetes, PTSD, asthma, seizures who presented to the hospital with right arm cellulitis and chest pain. Patient reports a history of spider bite when he was living in [place], and noticed a growing rash while doing physical therapy for weakness of right arm. Patient presented previously on 1/9 and was noted with the rash and was then sent home on augmentin.     Patient also reported history of chest pain on left side, around the area of growing rash, aggravated with movement and exertion, not relieved with nitroglycermine. EKG during admission was unremarkable for ischemia, with CTA, CXR, troponins being unremarkable.     Patient has been admitted for cellulitis with failed out patient augmentin therapy.     Interval Problem Update  NAEO  Patient reports no history of chest pain currently, denies shortness of breath, but reports soreness over areas of induration.   Denies nausea, vomiting, diarrhea.   On physical exam, noted decrease in induration, on upper right arm and left chest.   Patient planned to be continued on IV antibiotics, and anticipate switch to orals tomorrow.     I have discussed this patient's plan of care and discharge plan at IDT rounds today with Case Management, Nursing, Nursing leadership, and other members of the IDT team.    Consultants/Specialty  No consults have been made during this admission    Code Status  Full Code    Disposition  The patient is not medically cleared for discharge to  home or a post-acute facility.    Pt/OT pending  I have placed the appropriate orders for post-discharge needs.    Review of Systems  Review of Systems   Constitutional:  Negative for chills, fever and malaise/fatigue.   HENT:  Negative for hearing loss and sore throat.    Eyes:  Negative for blurred vision and double vision.   Respiratory:  Negative for cough and shortness of breath.    Cardiovascular:  Negative for chest pain and leg swelling.   Gastrointestinal:  Negative for abdominal pain, constipation, diarrhea, heartburn, nausea and vomiting.   Genitourinary:  Negative for dysuria.   Musculoskeletal:  Negative for myalgias.   Skin:  Positive for rash. Negative for itching.   Neurological:  Negative for dizziness, sensory change and headaches.        Physical Exam  Temp:  [36.6 °C (97.9 °F)-36.9 °C (98.5 °F)] 36.6 °C (97.9 °F)  Pulse:  [60-82] 64  Resp:  [18] 18  BP: (110-119)/(71-74) 117/71  SpO2:  [93 %-95 %] 93 %    Physical Exam  Constitutional:       Appearance: He is obese.   HENT:      Head: Normocephalic and atraumatic.      Nose: Nose normal.      Mouth/Throat:      Mouth: Mucous membranes are dry.      Pharynx: Oropharynx is clear.   Eyes:      Extraocular Movements: Extraocular movements intact.      Pupils: Pupils are equal, round, and reactive to light.   Cardiovascular:      Rate and Rhythm: Normal rate and regular rhythm.   Pulmonary:      Effort: Pulmonary effort is normal.      Breath sounds: Normal breath sounds.   Abdominal:      General: Abdomen is flat. Bowel sounds are normal.      Palpations: Abdomen is soft.   Musculoskeletal:      Cervical back: Normal range of motion and neck supple.   Skin:     General: Skin is warm and dry.      Capillary Refill: Capillary refill takes less than 2 seconds.      Comments: Patient was noted with crusting wounds, both on Right upper extremity and left torso, both associated with discreet indurations associated with warmth and tenderness to palpation.     Neurological:      Mental Status: He is alert and oriented to person, place, and time.         Fluids    Intake/Output Summary (Last 24 hours) at 2/8/2024 1148  Last data filed at 2/8/2024 1031  Gross per 24 hour   Intake 1040 ml   Output 1675 ml   Net -635 ml       Laboratory  Recent Labs     02/06/24  1606 02/07/24  0244 02/08/24  0250   WBC 14.7* 11.9* 8.7   RBC 4.13* 3.64* 3.61*   HEMOGLOBIN 12.5* 11.0* 10.9*   HEMATOCRIT 35.6* 32.4* 31.6*   MCV 86.2 89.0 87.5   MCH 30.3 30.2 30.2   MCHC 35.1 34.0 34.5   RDW 41.8 43.5 42.5   PLATELETCT 303 245 216   MPV 10.7 10.8 10.3     Recent Labs     02/06/24  1606 02/07/24  0244 02/08/24  0250   SODIUM 138 130* 132*   POTASSIUM 2.9* 4.0 4.1   CHLORIDE 110 96 97   CO2 15* 21 20   GLUCOSE 326* 366* 342*   BUN 11 15 21   CREATININE 0.58 0.78 0.87   CALCIUM 6.4* 9.0 9.3                   Imaging  CT-CTA CHEST PULMONARY ARTERY W/ RECONS   Final Result      1.  No evidence of pulmonary embolus.      2.  Bilateral nonspecific axillary lymphadenopathy could be malignant versus reactive.      3.  Increased density/edema in the left chest wall subcutaneous tissue could indicate cellulitis.            DX-CHEST-PORTABLE (1 VIEW)   Final Result      No acute abnormality of the chest.      EC-ECHOCARDIOGRAM COMPLETE W/O CONT    (Results Pending)        Assessment/Plan  Problem Representation:    * Cellulitis- (present on admission)  Assessment & Plan  Likely secondary to spider bite sustained last month. Failed outpatient augmentin therapy. Patient was started on unasyn, however blood cultures were not colelcted, and therefore ordered on day one of unasyn. Patient's ulcer/wound non purulent, non suppurative, however has associated erythema, with warmth, and tenderness. Also noted with axially lymphadenopathy on Rt axilla. WBC WNL, lactic acid WNL.   Erythema demarcated on admission 2/6/24  -Continue IV Unasyn      Other constipation  Assessment & Plan  Patient reports last bowel  movement 3 days ago, which is normal for patient. Patient denies history of abdominal pain, and reports to be passing flatulence.   -Started bowel protocol    Type 1 diabetes mellitus with hyperglycemia (HCC)- (present on admission)  Assessment & Plan  Start on insulin sliding scale with serial Accu-Checks  HbA1c 11.4  Hypoglycemic protocol in place  Started patient on half dose glargine at morning, regardless of breakfast time.       Hyponatremia- (present on admission)  Assessment & Plan  Patient noted with hyponatremia, in setting of type 1 diabetes and cellulitis secondary to spider bites.   -CTM  -Daily labs    Electrolyte abnormality  Assessment & Plan  Patient presented with hypocalcemia and hypokalemia and was appropriately repleted on admission.   -CTM electrolytes and replete as necessary  -Monitor telemetry    Seizure disorder (HCC)- (present on admission)  Assessment & Plan  Patient has been on prazosin, lurasidone, divalproex as out patient for psychiatric disorder, PTSD, and seizure disorder.   -To continue out patient medications    Right hemiparesis (HCC)- (present on admission)  Assessment & Plan  Patient has had multiple MRIs of the brain that did not show any evidence of stroke  Patient reports symptoms at baseline    Psychiatric problem- (present on admission)  Assessment & Plan  To continue out patient depakote.     Anxiety and depression- (present on admission)  Assessment & Plan  To continue out patient medication.          VTE prophylaxis: SCDs/TEDs    I have performed a physical exam and reviewed and updated ROS and Plan today (2/8/2024). In review of yesterday's note (2/7/2024), there are no changes except as documented above.

## 2024-02-09 ENCOUNTER — PHARMACY VISIT (OUTPATIENT)
Dept: PHARMACY | Facility: MEDICAL CENTER | Age: 44
End: 2024-02-09
Payer: COMMERCIAL

## 2024-02-09 VITALS
DIASTOLIC BLOOD PRESSURE: 62 MMHG | WEIGHT: 252.21 LBS | HEIGHT: 78 IN | SYSTOLIC BLOOD PRESSURE: 103 MMHG | OXYGEN SATURATION: 93 % | TEMPERATURE: 97.7 F | RESPIRATION RATE: 15 BRPM | HEART RATE: 62 BPM | BODY MASS INDEX: 29.18 KG/M2

## 2024-02-09 PROBLEM — E87.1 HYPONATREMIA: Status: RESOLVED | Noted: 2023-08-13 | Resolved: 2024-02-09

## 2024-02-09 PROBLEM — K59.09 OTHER CONSTIPATION: Status: RESOLVED | Noted: 2024-02-07 | Resolved: 2024-02-09

## 2024-02-09 PROBLEM — L03.90 CELLULITIS: Status: RESOLVED | Noted: 2024-02-06 | Resolved: 2024-02-09

## 2024-02-09 PROBLEM — E87.8 ELECTROLYTE ABNORMALITY: Status: RESOLVED | Noted: 2024-02-06 | Resolved: 2024-02-09

## 2024-02-09 LAB
ALBUMIN SERPL BCP-MCNC: 3.8 G/DL (ref 3.2–4.9)
ALBUMIN/GLOB SERPL: 1.7 G/DL
ALP SERPL-CCNC: 82 U/L (ref 30–99)
ALT SERPL-CCNC: 10 U/L (ref 2–50)
ANION GAP SERPL CALC-SCNC: 12 MMOL/L (ref 7–16)
AST SERPL-CCNC: 7 U/L (ref 12–45)
BASOPHILS # BLD AUTO: 0.8 % (ref 0–1.8)
BASOPHILS # BLD: 0.06 K/UL (ref 0–0.12)
BILIRUB SERPL-MCNC: 0.2 MG/DL (ref 0.1–1.5)
BUN SERPL-MCNC: 22 MG/DL (ref 8–22)
CALCIUM ALBUM COR SERPL-MCNC: 8.9 MG/DL (ref 8.5–10.5)
CALCIUM SERPL-MCNC: 8.7 MG/DL (ref 8.5–10.5)
CHLORIDE SERPL-SCNC: 97 MMOL/L (ref 96–112)
CO2 SERPL-SCNC: 27 MMOL/L (ref 20–33)
CREAT SERPL-MCNC: 0.92 MG/DL (ref 0.5–1.4)
EOSINOPHIL # BLD AUTO: 0.12 K/UL (ref 0–0.51)
EOSINOPHIL NFR BLD: 1.7 % (ref 0–6.9)
ERYTHROCYTE [DISTWIDTH] IN BLOOD BY AUTOMATED COUNT: 43.4 FL (ref 35.9–50)
GFR SERPLBLD CREATININE-BSD FMLA CKD-EPI: 107 ML/MIN/1.73 M 2
GLOBULIN SER CALC-MCNC: 2.2 G/DL (ref 1.9–3.5)
GLUCOSE BLD STRIP.AUTO-MCNC: 181 MG/DL (ref 65–99)
GLUCOSE BLD STRIP.AUTO-MCNC: 220 MG/DL (ref 65–99)
GLUCOSE SERPL-MCNC: 223 MG/DL (ref 65–99)
HCT VFR BLD AUTO: 30.8 % (ref 42–52)
HGB BLD-MCNC: 10.6 G/DL (ref 14–18)
IMM GRANULOCYTES # BLD AUTO: 0.12 K/UL (ref 0–0.11)
IMM GRANULOCYTES NFR BLD AUTO: 1.7 % (ref 0–0.9)
LYMPHOCYTES # BLD AUTO: 3.5 K/UL (ref 1–4.8)
LYMPHOCYTES NFR BLD: 48.3 % (ref 22–41)
MCH RBC QN AUTO: 30.7 PG (ref 27–33)
MCHC RBC AUTO-ENTMCNC: 34.4 G/DL (ref 32.3–36.5)
MCV RBC AUTO: 89.3 FL (ref 81.4–97.8)
MONOCYTES # BLD AUTO: 0.41 K/UL (ref 0–0.85)
MONOCYTES NFR BLD AUTO: 5.7 % (ref 0–13.4)
NEUTROPHILS # BLD AUTO: 3.04 K/UL (ref 1.82–7.42)
NEUTROPHILS NFR BLD: 41.8 % (ref 44–72)
NRBC # BLD AUTO: 0 K/UL
NRBC BLD-RTO: 0 /100 WBC (ref 0–0.2)
PLATELET # BLD AUTO: 222 K/UL (ref 164–446)
PMV BLD AUTO: 10.4 FL (ref 9–12.9)
POTASSIUM SERPL-SCNC: 3.7 MMOL/L (ref 3.6–5.5)
PROT SERPL-MCNC: 6 G/DL (ref 6–8.2)
RBC # BLD AUTO: 3.45 M/UL (ref 4.7–6.1)
SODIUM SERPL-SCNC: 136 MMOL/L (ref 135–145)
WBC # BLD AUTO: 7.3 K/UL (ref 4.8–10.8)

## 2024-02-09 PROCEDURE — A9270 NON-COVERED ITEM OR SERVICE: HCPCS

## 2024-02-09 PROCEDURE — 80053 COMPREHEN METABOLIC PANEL: CPT

## 2024-02-09 PROCEDURE — 82962 GLUCOSE BLOOD TEST: CPT

## 2024-02-09 PROCEDURE — 85025 COMPLETE CBC W/AUTO DIFF WBC: CPT

## 2024-02-09 PROCEDURE — 99239 HOSP IP/OBS DSCHRG MGMT >30: CPT | Mod: GC | Performed by: INTERNAL MEDICINE

## 2024-02-09 PROCEDURE — RXMED WILLOW AMBULATORY MEDICATION CHARGE

## 2024-02-09 PROCEDURE — 700111 HCHG RX REV CODE 636 W/ 250 OVERRIDE (IP): Mod: JZ | Performed by: HOSPITALIST

## 2024-02-09 PROCEDURE — 700102 HCHG RX REV CODE 250 W/ 637 OVERRIDE(OP)

## 2024-02-09 PROCEDURE — 700105 HCHG RX REV CODE 258: Performed by: HOSPITALIST

## 2024-02-09 RX ORDER — CEPHALEXIN 250 MG/1
1000 CAPSULE ORAL 3 TIMES DAILY
Qty: 84 CAPSULE | Refills: 0 | Status: ACTIVE | OUTPATIENT
Start: 2024-02-09 | End: 2024-02-16

## 2024-02-09 RX ORDER — CEPHALEXIN 250 MG/1
500 CAPSULE ORAL 4 TIMES DAILY
Qty: 56 CAPSULE | Refills: 0 | Status: SHIPPED | OUTPATIENT
Start: 2024-02-09 | End: 2024-02-09

## 2024-02-09 RX ORDER — POLYETHYLENE GLYCOL 3350 17 G/17G
17 POWDER, FOR SOLUTION ORAL 2 TIMES DAILY PRN
Qty: 28 EACH | Refills: 0 | Status: SHIPPED | OUTPATIENT
Start: 2024-02-09

## 2024-02-09 RX ADMIN — INSULIN HUMAN 3 UNITS: 100 INJECTION, SOLUTION PARENTERAL at 09:20

## 2024-02-09 RX ADMIN — ASPIRIN 81 MG: 81 TABLET, COATED ORAL at 04:16

## 2024-02-09 RX ADMIN — SERTRALINE HYDROCHLORIDE 150 MG: 50 TABLET ORAL at 04:15

## 2024-02-09 RX ADMIN — LEVOTHYROXINE SODIUM 225 MCG: 0.2 TABLET ORAL at 04:15

## 2024-02-09 RX ADMIN — AMPICILLIN AND SULBACTAM 3 G: 1; 2 INJECTION, POWDER, FOR SOLUTION INTRAMUSCULAR; INTRAVENOUS at 04:18

## 2024-02-09 RX ADMIN — FENOFIBRATE 134 MG: 134 CAPSULE ORAL at 04:16

## 2024-02-09 RX ADMIN — DIVALPROEX SODIUM 500 MG: 500 TABLET, DELAYED RELEASE ORAL at 04:16

## 2024-02-09 RX ADMIN — AMPICILLIN AND SULBACTAM 3 G: 1; 2 INJECTION, POWDER, FOR SOLUTION INTRAMUSCULAR; INTRAVENOUS at 10:45

## 2024-02-09 RX ADMIN — OMEGA-3 FATTY ACIDS CAP 1000 MG 2000 MG: 1000 CAP at 04:16

## 2024-02-09 ASSESSMENT — FIBROSIS 4 INDEX: FIB4 SCORE: 0.85

## 2024-02-09 ASSESSMENT — ENCOUNTER SYMPTOMS
COUGH: 0
NAUSEA: 0
HEARTBURN: 0
VOMITING: 0
DEPRESSION: 0
CONSTITUTIONAL NEGATIVE: 1

## 2024-02-09 ASSESSMENT — PAIN DESCRIPTION - PAIN TYPE: TYPE: ACUTE PAIN

## 2024-02-09 NOTE — PROGRESS NOTES
This note is intended for the purposes of medical student education and feedback only.   Please refer to the documentation by this patient's assigned medical practitioner for details of care and plans.    Reason for admission:   Norm Prabhakar is a 43 y.o. male who was admitted with right arm and left chest cellulitis.    Chief Complaint   Patient presents with    Chest Pain     Started yesterday while at PT.   324 ASA given by EMS. 2 nitro with no relief and 50 Fent with no relief to pain     Rash     R arm  L pec    High Blood Sugar     490 for EMS        HD/POD#: 4    SUBJECTIVE  Patient denies any overnight events. Reports not yet having a bowel movement since admission, however, states that is his baseline. Additionally, endorses that the pain accompanying the rash has decreased since admission.     Review of Systems   Constitutional: Negative.    Respiratory:  Negative for cough.    Gastrointestinal:  Negative for heartburn, nausea and vomiting.   Skin: Negative.    Psychiatric/Behavioral:  Negative for depression and suicidal ideas.           OBJECTIVE   Vital Signs:  O2 Sat: 93%    Vitals:    02/08/24 1947 02/08/24 2337 02/09/24 0349 02/09/24 0739   BP: 104/61 101/57 99/60 103/62   Pulse: (!) 50  74 62   Resp: 16 12 12 15   Temp: 36.7 °C (98.1 °F) 36.7 °C (98.1 °F) 36.7 °C (98.1 °F) 36.5 °C (97.7 °F)   TempSrc: Temporal Temporal Temporal Temporal   SpO2: 96% 95% 92% 93%   Weight:   114 kg (252 lb 3.3 oz)    Height:           Physical Exam  Constitutional:       Appearance: He is obese.   Cardiovascular:      Rate and Rhythm: Normal rate and regular rhythm.      Pulses: Normal pulses.      Heart sounds: Normal heart sounds.   Pulmonary:      Effort: Pulmonary effort is normal.      Breath sounds: Normal breath sounds.   Skin:     Findings: Rash present.      Comments: Rash overlaying right arm and left chest: erythema is markedly improved, appears light pink in appearance. Discoloration is regressing  away from marked area. Patient tolerating deep palpation over the area.   Neurological:      Mental Status: He is alert.          Ins/Outs:    Intake/Output Summary (Last 24 hours) at 2/9/2024 1203  Last data filed at 2/9/2024 0800  Gross per 24 hour   Intake 2182.41 ml   Output 1180 ml   Net 1002.41 ml         Lab Results:  Recent Labs     02/07/24  0244 02/08/24  0250 02/09/24  0523   WBC 11.9* 8.7 7.3   RBC 3.64* 3.61* 3.45*   HEMOGLOBIN 11.0* 10.9* 10.6*   HEMATOCRIT 32.4* 31.6* 30.8*   MCV 89.0 87.5 89.3   MCH 30.2 30.2 30.7   MCHC 34.0 34.5 34.4   RDW 43.5 42.5 43.4   PLATELETCT 245 216 222   MPV 10.8 10.3 10.4     Recent Labs     02/07/24  0244 02/08/24  0250 02/09/24  0523   SODIUM 130* 132* 136   POTASSIUM 4.0 4.1 3.7   CHLORIDE 96 97 97   CO2 21 20 27   GLUCOSE 366* 342* 223*   BUN 15 21 22   CREATININE 0.78 0.87 0.92   CALCIUM 9.0 9.3 8.7         Recent Labs     02/07/24 0244 02/08/24  0250 02/09/24  0523   ASTSGOT 7* 10* 7*   ALTSGPT 9 5 10   TBILIRUBIN 0.4 0.2 0.2   ALKPHOSPHAT 103* 100* 82   GLOBULIN 2.7 3.0 2.2       Imaging Results:  EC-ECHOCARDIOGRAM COMPLETE W/O CONT   Final Result      CT-CTA CHEST PULMONARY ARTERY W/ RECONS   Final Result      1.  No evidence of pulmonary embolus.      2.  Bilateral nonspecific axillary lymphadenopathy could be malignant versus reactive.      3.  Increased density/edema in the left chest wall subcutaneous tissue could indicate cellulitis.            DX-CHEST-PORTABLE (1 VIEW)   Final Result      No acute abnormality of the chest.            ASSESSMENT/PLAN  Principal Problem (Resolved): Mr. Prabhakar is a 63-year-old patient admitted for right arm and left chest cellulitis. He is currently medically-stable and his cellulitis has resolved. Patient is stable and medically clear for discharge with oral Augmentin.    #Cellulitis   > Patient reports decrease in pain severity associated with his acute cellulitis. Denies any associated symptoms with antibiotics. Physical  revealed significant improvement over the area and decrease in the erythema, tenderness, and overall surface area involved. Rash is light pink in color and shows regression from the initially marked site.  > Will transition to oral Cephalexin 500 mg, 4 times daily, for one week.  > Recommended follow up with PCP.    #Type 1 diabetes mellitus with hyperglycemia  > Latest POCT Glucose levels are 223 which is a reduction from admission (300-400).  > Diabetes educator and nutrition have provided additional information and answered the patients questions regarding DM1. Patient has communicated he understand.  > Will continue home antidiabetic medications.    #Constipation  Patient denies yet having a bowel movement, but reports this is his baseline.   > Will take home home Miralax PRN, hold for loose stools.            _____  Written By:  Medical Student: Behzad Farr    Overseen By:  Attending Physician: Jono Jones MD  Intern Resident: Roby Corona MD

## 2024-02-09 NOTE — DISCHARGE SUMMARY
[facility] Internal Medicine Discharge Summary    Attending: Jono Jones M.d.  Senior Resident: Dr. Pearson  Intern:  Dr. Corona  Contact Number: [phone]    CHIEF COMPLAINT ON ADMISSION  Chief Complaint   Patient presents with    Chest Pain     Started yesterday while at PT.   324 ASA given by EMS. 2 nitro with no relief and 50 Fent with no relief to pain     Rash     R arm  L pec    High Blood Sugar     490 for EMS       Reason for Admission  EMS     Admission Date  2/6/2024    CODE STATUS  Full Code    HPI & HOSPITAL COURSE  Norm Prabhakar is a 43 y.o. male who presented on 2/6/2024 with past medical history of conversion disorder, type I diabetes, PTSD, asthma, seizures who presented to the hospital with right arm cellulitis and chest pain. Patient reports a history of spider bite when he was living in [facility], and noticed a growing rash while doing physical therapy for weakness of right arm. Patient presented previously on 1/9 and was noted with the rash and was then sent home on augmentin.     Patient also reported history of chest pain on left side, around the area of growing rash, aggravated with movement and exertion, not relieved with nitroglycermine. EKG during admission was unremarkable for ischemia, with CTA, CXR, troponins being unremarkable.     Patient had been admitted for cellulitis with failed out patient augmentin therapy, was started on unasyn and completed 3 complete days of IV unasyn, and was discharged on oral cephalexin to be continued for 7 days.     Therefore, he is discharged in good and stable condition to home with close outpatient follow-up.    The patient met 2-midnight criteria for an inpatient stay at the time of discharge.    Discharge Date  2/9/24    Physical Exam on Day of Discharge  Physical Exam  Constitutional:       Appearance: He is obese.   HENT:      Head: Normocephalic and atraumatic.      Nose: Nose normal.      Mouth/Throat:      Mouth: Mucous membranes are well  hydrated      Pharynx: Oropharynx is clear.   Eyes:      Extraocular Movements: Extraocular movements intact.      Pupils: Pupils are equal, round, and reactive to light.   Cardiovascular:      Rate and Rhythm: Normal rate and regular rhythm.   Pulmonary:      Effort: Pulmonary effort is normal.      Breath sounds: Normal breath sounds.   Abdominal:      General: Abdomen is flat. Bowel sounds are normal.      Palpations: Abdomen is soft.   Musculoskeletal:      Cervical back: Normal range of motion and neck supple.   Skin:     General: Skin is warm and dry.      Capillary Refill: Capillary refill takes less than 2 seconds.      Comments: Patient was noted with crusting wounds, both on Right upper extremity and left torso, both associated with discreet indurations associated with warmth and tenderness to palpation.    Neurological:      Mental Status: He is alert and oriented to person, place, and time.     FOLLOW UP ITEMS POST DISCHARGE  Follow up with primary care physician  Follow up with physical therapy for continuation of out patient physical therapy.     DISCHARGE DIAGNOSES  Principal Problem (Resolved):    Cellulitis (POA: Yes)  Active Problems:    Type 1 diabetes mellitus with hyperglycemia (HCC) (POA: Yes)    Anxiety and depression (POA: Yes)    Psychiatric problem (POA: Yes)      Overview: PTSD    Right hemiparesis (HCC) (POA: Yes)    Seizure disorder (HCC) (POA: Yes)  Resolved Problems:    Hyponatremia (POA: Yes)    Other constipation (POA: Unknown)    CVA (cerebral vascular accident) (HCC) (POA: Yes)    Electrolyte abnormality (POA: Unknown)      FOLLOW UP  No future appointments.  No follow-up provider specified.    MEDICATIONS ON DISCHARGE     Medication List        START taking these medications        Instructions   cephALEXin 250 MG Caps  Commonly known as: Keflex   Take 2 Capsules by mouth 4 times a day for 7 days.  Dose: 500 mg     polyethylene glycol/lytes Pack  Commonly known as: Miralax   Take  1 Packet by mouth 2 times a day as needed (For constipation, hold for loose stools).  Dose: 17 g            CONTINUE taking these medications        Instructions   aspirin 81 MG EC tablet   Take 81 mg by mouth every day.  Dose: 81 mg     divalproex 500 MG Tbec  Commonly known as: Depakote   Take 500-1,500 mg by mouth 2 times a day. 500 mg in AM and 1500 mg at night  Dose: 500-1,500 mg     Fenofibrate 200 MG Caps   Take 200 mg by mouth every day.  Dose: 200 mg     HumaLOG KwikPen 100 UNIT/ML Sopn injection PEN  Generic drug: insulin lispro   Inject 2-9 Units under the skin 3 times a day before meals for 30 days. Take as per scale: 151-200: 2 u, 201-250: 3 u, 251-300: 5u, 301-350: 6 u, 351-400: 8 u, more than 400: 9 u.  Dose: 2-9 Units     Lantus SoloStar 100 UNIT/ML Sopn injection  Generic drug: insulin glargine   Inject 32 Units under the skin 2 times a day for 30 days.  Dose: 32 Units     latanoprost 0.005 % Soln  Commonly known as: Xalatan   Administer 1 Drop into both eyes every evening.  Dose: 1 Drop     Latuda 80 MG Tabs  Generic drug: lurasidone   Take 80 mg by mouth with dinner.  Dose: 80 mg     * levothyroxine 200 MCG Tabs  Commonly known as: Synthroid   Take 200 mcg by mouth every morning on an empty stomach. Total of 225 mcg once a day  Dose: 200 mcg     * levothyroxine 25 MCG Tabs  Commonly known as: Synthroid   Take 25 mcg by mouth every morning on an empty stomach. Total of 225 mcg once a day  Dose: 25 mcg     prazosin 5 MG Caps  Commonly known as: Minipress   Take 5 mg by mouth every evening.  Dose: 5 mg     sertraline 100 MG Tabs  Commonly known as: Zoloft   Take 150 mg by mouth every day.  Dose: 150 mg     traZODone 50 MG Tabs  Commonly known as: Desyrel   Take 50 mg by mouth every evening. Indications: Trouble Sleeping  Dose: 50 mg     Vascepa 1 g Caps  Generic drug: Icosapent Ethyl   Take 2 g by mouth 2 times a day.  Dose: 2 g     vitamin D3 1000 Unit (25 mcg) Tabs  Commonly known as:  "Cholecalciferol   Take 1,000 Units by mouth at bedtime.  Dose: 1,000 Units           * This list has 2 medication(s) that are the same as other medications prescribed for you. Read the directions carefully, and ask your doctor or other care provider to review them with you.                STOP taking these medications      loratadine 10 MG Tabs  Commonly known as: Claritin              Allergies  Allergies   Allergen Reactions    Abilify      \"Feeling tired, like I don't even know whats going on around me\"    Fish      Pt reports salmon causes him to be sick to his stomach  Not listed on MAR noted 2/3/2021      Geodon [Ziprasidone Hcl] Anaphylaxis     Anaphylaxis per patient    Aripiprazole      \"I became lethargic.\"    Hydroxyzine Itching     Pt will only state \"I feel itchy when I take it    Ziprasidone        DIET  Orders Placed This Encounter   Procedures    Diet Order Diet: Consistent CHO (Diabetic)     Standing Status:   Standing     Number of Occurrences:   1     Order Specific Question:   Diet:     Answer:   Consistent CHO (Diabetic) [4]       ACTIVITY  As tolerated.  Weight bearing as tolerated    CONSULTATIONS  No consults placed during this admission    PROCEDURES  No procedures during this admission    LABORATORY  Lab Results   Component Value Date    SODIUM 136 02/09/2024    POTASSIUM 3.7 02/09/2024    CHLORIDE 97 02/09/2024    CO2 27 02/09/2024    GLUCOSE 223 (H) 02/09/2024    BUN 22 02/09/2024    CREATININE 0.92 02/09/2024    GLOMRATE 89 05/18/2023        Lab Results   Component Value Date    WBC 7.3 02/09/2024    HEMOGLOBIN 10.6 (L) 02/09/2024    HEMATOCRIT 30.8 (L) 02/09/2024    PLATELETCT 222 02/09/2024        Total time of the discharge process exceeds 40 minutes.  "

## 2024-02-09 NOTE — PROGRESS NOTES
Assumed care of patient, received bedside report from Gloria ANNE. Patient is A&O X 4. Pain is 0/10, on room air. On tele monitor, SR. Plan of care discussed with patient. Patient verbalized understanding. All questions answered. Call light within reach and fall precautions in place. Bed locked and in lowest position.

## 2024-02-09 NOTE — PROGRESS NOTES
Pt medically cleared for discharge to home/self care. Pt is Aox4, updated on DC process. Orders placed for discharge lounge. Pt will need cab voucher home. Per [facility] RN voucher will be provided to pt in DC lounge. Tele monitor removed, all personal possessions accounted for.

## 2024-02-09 NOTE — DISCHARGE INSTRUCTIONS
Please follow up with your primary care provider  Please continue taking your oral antibiotics as prescribed.

## 2024-02-09 NOTE — CARE PLAN
The patient is Stable - Low risk of patient condition declining or worsening    Shift Goals  Clinical Goals: IV ABX, possible DC home  Patient Goals: To feel better and go home  Family Goals: NA    Progress made toward(s) clinical / shift goals:      Problem: Knowledge Deficit - Standard  Goal: Patient and family/care givers will demonstrate understanding of plan of care, disease process/condition, diagnostic tests and medications  Description: Target End Date:  1-3 days or as soon as patient condition allows    Document in Patient Education    1.  Patient and family/caregiver oriented to unit, equipment, visitation policy and means for communicating concern  2.  Complete/review Learning Assessment  3.  Assess knowledge level of disease process/condition, treatment plan, diagnostic tests and medications  4.  Explain disease process/condition, treatment plan, diagnostic tests and medications  Outcome: Met     Problem: Fall Risk  Goal: Patient will remain free from falls  Description: Target End Date:  Prior to discharge or change in level of care    Document interventions on the [facility] Fall Risk Assessment    1.  Assess for fall risk factors  2.  Implement fall precautions  Outcome: Met     Problem: Provide Safe Environment  Goal: Suicide environmental safety, protocols, policies, and practices will be implemented  Description: Target End Date:  resolve day 1    1.  Remove objects or personal belongings that may cause harm or injury to self or others  2.  Dietary tray modifications (paperware)  3.  Provide a safe environment  4.  Render close patient supervision by sustaining observation or awareness of the patient at all times  Outcome: Met     Problem: Psychosocial  Goal: Patient's ability to identify and develop effective coping behaviors will improve  Description: Target End Date:  1 to 3 days    1.  Present opportunities for the patient to express thoughts, and feelings in a nonjudgmental environment  2.   Help the patient with problem-solving in a constructive manner.  3.  Educate the patient on cognitive-behavioral self-management responses to suicidal thoughts.  4.  Introduce the use of self-expression methods to manage suicidal feelings  5.  Provide emotional support  6.  Encourage identification of positive aspects of self  Outcome: Met  Goal: Patient's ability to identify and utilize available support systems will improve  Description: Target End Date:  1 to 3 days    1.  Help patient identify available resources and support systems  2.  Collaborate with interdisciplinary team  3.  Collaborate with patient, family/caregiver and other support systems  Outcome: Met     Problem: Pain - Standard  Goal: Alleviation of pain or a reduction in pain to the patient’s comfort goal  Description: Target End Date:  Prior to discharge or change in level of care    Document on Vitals flowsheet    1.  Document pain using the appropriate pain scale per order or unit policy  2.  Educate and implement non-pharmacologic comfort measures (i.e. relaxation, distraction, massage, cold/heat therapy, etc.)  3.  Pain management medications as ordered  4.  Reassess pain after pain med administration per policy  5.  If opiods administered assess patient's response to pain medication is appropriate per POSS sedation scale  6.  Follow pain management plan developed in collaboration with patient and interdisciplinary team (including palliative care or pain specialists if applicable)  Outcome: Met     Problem: Knowledge Deficit - Standard  Goal: Patient and family/care givers will demonstrate understanding of plan of care, disease process/condition, diagnostic tests and medications  Description: Target End Date:  1-3 days or as soon as patient condition allows    Document in Patient Education    1.  Patient and family/caregiver oriented to unit, equipment, visitation policy and means for communicating concern  2.  Complete/review Learning  Assessment  3.  Assess knowledge level of disease process/condition, treatment plan, diagnostic tests and medications  4.  Explain disease process/condition, treatment plan, diagnostic tests and medications  Outcome: Met     Problem: Fall Risk  Goal: Patient will remain free from falls  Description: Target End Date:  Prior to discharge or change in level of care    Document interventions on the [facility] Fall Risk Assessment    1.  Assess for fall risk factors  2.  Implement fall precautions  Outcome: Met     Problem: Provide Safe Environment  Goal: Suicide environmental safety, protocols, policies, and practices will be implemented  Description: Target End Date:  resolve day 1    1.  Remove objects or personal belongings that may cause harm or injury to self or others  2.  Dietary tray modifications (paperware)  3.  Provide a safe environment  4.  Render close patient supervision by sustaining observation or awareness of the patient at all times  Outcome: Met     Problem: Psychosocial  Goal: Patient's ability to identify and develop effective coping behaviors will improve  Description: Target End Date:  1 to 3 days    1.  Present opportunities for the patient to express thoughts, and feelings in a nonjudgmental environment  2.  Help the patient with problem-solving in a constructive manner.  3.  Educate the patient on cognitive-behavioral self-management responses to suicidal thoughts.  4.  Introduce the use of self-expression methods to manage suicidal feelings  5.  Provide emotional support  6.  Encourage identification of positive aspects of self  Outcome: Met  Goal: Patient's ability to identify and utilize available support systems will improve  Description: Target End Date:  1 to 3 days    1.  Help patient identify available resources and support systems  2.  Collaborate with interdisciplinary team  3.  Collaborate with patient, family/caregiver and other support systems  Outcome: Met     Problem: Pain -  Standard  Goal: Alleviation of pain or a reduction in pain to the patient’s comfort goal  Description: Target End Date:  Prior to discharge or change in level of care    Document on Vitals flowsheet    1.  Document pain using the appropriate pain scale per order or unit policy  2.  Educate and implement non-pharmacologic comfort measures (i.e. relaxation, distraction, massage, cold/heat therapy, etc.)  3.  Pain management medications as ordered  4.  Reassess pain after pain med administration per policy  5.  If opiods administered assess patient's response to pain medication is appropriate per POSS sedation scale  6.  Follow pain management plan developed in collaboration with patient and interdisciplinary team (including palliative care or pain specialists if applicable)  Outcome: Met       Patient is not progressing towards the following goals:NA    Pt medically cleared to discharge to home/self care

## 2024-02-09 NOTE — CARE PLAN
The patient is Stable - Low risk of patient condition declining or worsening    Shift Goals  Clinical Goals: Monitor VS/labs, glucose control, IV ABX  Patient Goals: To feel better and go home  Family Goals: NA    Progress made toward(s) clinical / shift goals:      Problem: Knowledge Deficit - Standard  Goal: Patient and family/care givers will demonstrate understanding of plan of care, disease process/condition, diagnostic tests and medications  Description: Target End Date:  1-3 days or as soon as patient condition allows    Document in Patient Education    1.  Patient and family/caregiver oriented to unit, equipment, visitation policy and means for communicating concern  2.  Complete/review Learning Assessment  3.  Assess knowledge level of disease process/condition, treatment plan, diagnostic tests and medications  4.  Explain disease process/condition, treatment plan, diagnostic tests and medications  Outcome: Progressing     Problem: Fall Risk  Goal: Patient will remain free from falls  Description: Target End Date:  Prior to discharge or change in level of care    Document interventions on the [facility] Fall Risk Assessment    1.  Assess for fall risk factors  2.  Implement fall precautions  Outcome: Progressing     Problem: Provide Safe Environment  Goal: Suicide environmental safety, protocols, policies, and practices will be implemented  Description: Target End Date:  resolve day 1    1.  Remove objects or personal belongings that may cause harm or injury to self or others  2.  Dietary tray modifications (paperware)  3.  Provide a safe environment  4.  Render close patient supervision by sustaining observation or awareness of the patient at all times  Outcome: Progressing     Problem: Psychosocial  Goal: Patient's ability to identify and develop effective coping behaviors will improve  Description: Target End Date:  1 to 3 days    1.  Present opportunities for the patient to express thoughts, and  feelings in a nonjudgmental environment  2.  Help the patient with problem-solving in a constructive manner.  3.  Educate the patient on cognitive-behavioral self-management responses to suicidal thoughts.  4.  Introduce the use of self-expression methods to manage suicidal feelings  5.  Provide emotional support  6.  Encourage identification of positive aspects of self  Outcome: Progressing  Goal: Patient's ability to identify and utilize available support systems will improve  Description: Target End Date:  1 to 3 days    1.  Help patient identify available resources and support systems  2.  Collaborate with interdisciplinary team  3.  Collaborate with patient, family/caregiver and other support systems  Outcome: Progressing     Problem: Pain - Standard  Goal: Alleviation of pain or a reduction in pain to the patient’s comfort goal  Description: Target End Date:  Prior to discharge or change in level of care    Document on Vitals flowsheet    1.  Document pain using the appropriate pain scale per order or unit policy  2.  Educate and implement non-pharmacologic comfort measures (i.e. relaxation, distraction, massage, cold/heat therapy, etc.)  3.  Pain management medications as ordered  4.  Reassess pain after pain med administration per policy  5.  If opiods administered assess patient's response to pain medication is appropriate per POSS sedation scale  6.  Follow pain management plan developed in collaboration with patient and interdisciplinary team (including palliative care or pain specialists if applicable)  Outcome: Progressing       Patient is not progressing towards the following goals: NA

## 2024-02-09 NOTE — PROGRESS NOTES
Monitor summary:     Rhythm : SR  Rate : 60-72  Ectopy : (R) PVCs, Couplet    HI=.18  QRS=.10  QT=.40        Per telemetry strip summary from monitor room.

## 2024-02-09 NOTE — PROGRESS NOTES
DC instructions reviewed w/ pt , verbalized understanding. PIV dc'd, armband removed. Meds to bed delivered.   Spoke with wife, Melyssa, to verify antibiotics patient is on. Melyssa states Laron is taking cephalexin 500mg 2x per day. That antibiotic was prescribed by Dr. Orr who is an ID physician. I told her that he will be the one managing that medication not Dr. Bonilla. I told Melyssa I will call wound care nurse to relay the message as well and let her know the would care recommendations. All questions were answered. Patient agrees and understands the plan.    Called Lea, wound care nurse, and explained the antibiotics are prescribed by Dr. Orr so he should be managing those. In regards to the wound care, Dr. Bonilla is okay with the iodoform dressings and alternating with and without medihoney. No further questions.     Closing encounter.

## 2024-02-09 NOTE — CARE PLAN
The patient is Stable - Low risk of patient condition declining or worsening    Shift Goals  Clinical Goals: IV abx, monitor sugars, increase insulin  Patient Goals: comfort and rest  Family Goals: NA    Progress made toward(s) clinical / shift goals:    Problem: Fall Risk  Goal: Patient will remain free from falls  Description: Target End Date:  Prior to discharge or change in level of care    Document interventions on the [facility] Fall Risk Assessment    1.  Assess for fall risk factors  2.  Implement fall precautions  Outcome: Progressing     Problem: Psychosocial  Goal: Patient's ability to identify and develop effective coping behaviors will improve  Description: Target End Date:  1 to 3 days    1.  Present opportunities for the patient to express thoughts, and feelings in a nonjudgmental environment  2.  Help the patient with problem-solving in a constructive manner.  3.  Educate the patient on cognitive-behavioral self-management responses to suicidal thoughts.  4.  Introduce the use of self-expression methods to manage suicidal feelings  5.  Provide emotional support  6.  Encourage identification of positive aspects of self  Outcome: Progressing     Problem: Pain - Standard  Goal: Alleviation of pain or a reduction in pain to the patient’s comfort goal  Description: Target End Date:  Prior to discharge or change in level of care    Document on Vitals flowsheet    1.  Document pain using the appropriate pain scale per order or unit policy  2.  Educate and implement non-pharmacologic comfort measures (i.e. relaxation, distraction, massage, cold/heat therapy, etc.)  3.  Pain management medications as ordered  4.  Reassess pain after pain med administration per policy  5.  If opiods administered assess patient's response to pain medication is appropriate per POSS sedation scale  6.  Follow pain management plan developed in collaboration with patient and interdisciplinary team (including palliative care or  pain specialists if applicable)  Outcome: Progressing

## 2024-02-09 NOTE — DISCHARGE PLANNING
DC Transport Scheduled    Transport Company Scheduled:  [employer]  Spoke with Sofia at [facility] to schedule transport.  [facility] Trip #: [id number]    Scheduled Date: 2/9/2024  Scheduled Time: 1300    Destination: Home at [address] [place] [address]     Notified care team of scheduled transport via [employer].     If there are any changes needed to the DC transportation scheduled, please contact [facility] at ext. [phone] between the hours of 1694-1547 Mon-Fri. If outside those hours, contact the ED Case Manager at ext. [phone].

## 2024-03-25 ENCOUNTER — HOSPITAL ENCOUNTER (EMERGENCY)
Facility: MEDICAL CENTER | Age: 44
End: 2024-03-25
Attending: EMERGENCY MEDICINE
Payer: MEDICAID

## 2024-03-25 VITALS
RESPIRATION RATE: 17 BRPM | HEART RATE: 56 BPM | WEIGHT: 260.14 LBS | TEMPERATURE: 98.1 F | OXYGEN SATURATION: 90 % | SYSTOLIC BLOOD PRESSURE: 107 MMHG | DIASTOLIC BLOOD PRESSURE: 69 MMHG | BODY MASS INDEX: 30.06 KG/M2

## 2024-03-25 DIAGNOSIS — G43.101 MIGRAINE WITH AURA AND WITH STATUS MIGRAINOSUS, NOT INTRACTABLE: ICD-10-CM

## 2024-03-25 DIAGNOSIS — B34.9 VIRAL SYNDROME: ICD-10-CM

## 2024-03-25 LAB
FLUAV RNA SPEC QL NAA+PROBE: NEGATIVE
FLUBV RNA SPEC QL NAA+PROBE: NEGATIVE
RSV RNA SPEC QL NAA+PROBE: NEGATIVE
SARS-COV-2 RNA RESP QL NAA+PROBE: NOTDETECTED

## 2024-03-25 PROCEDURE — 700105 HCHG RX REV CODE 258: Mod: UD | Performed by: EMERGENCY MEDICINE

## 2024-03-25 PROCEDURE — 99284 EMERGENCY DEPT VISIT MOD MDM: CPT

## 2024-03-25 PROCEDURE — 0241U HCHG SARS-COV-2 COVID-19 NFCT DS RESP RNA 4 TRGT ED POC: CPT

## 2024-03-25 PROCEDURE — 700111 HCHG RX REV CODE 636 W/ 250 OVERRIDE (IP): Mod: JZ,UD | Performed by: EMERGENCY MEDICINE

## 2024-03-25 PROCEDURE — 96374 THER/PROPH/DIAG INJ IV PUSH: CPT

## 2024-03-25 PROCEDURE — 96375 TX/PRO/DX INJ NEW DRUG ADDON: CPT

## 2024-03-25 RX ORDER — KETOROLAC TROMETHAMINE 15 MG/ML
15 INJECTION, SOLUTION INTRAMUSCULAR; INTRAVENOUS ONCE
Status: COMPLETED | OUTPATIENT
Start: 2024-03-25 | End: 2024-03-25

## 2024-03-25 RX ORDER — SODIUM CHLORIDE 9 MG/ML
1000 INJECTION, SOLUTION INTRAVENOUS ONCE
Status: COMPLETED | OUTPATIENT
Start: 2024-03-25 | End: 2024-03-25

## 2024-03-25 RX ORDER — PROCHLORPERAZINE EDISYLATE 5 MG/ML
10 INJECTION INTRAMUSCULAR; INTRAVENOUS ONCE
Status: COMPLETED | OUTPATIENT
Start: 2024-03-25 | End: 2024-03-25

## 2024-03-25 RX ORDER — ONDANSETRON 2 MG/ML
4 INJECTION INTRAMUSCULAR; INTRAVENOUS ONCE
Status: COMPLETED | OUTPATIENT
Start: 2024-03-25 | End: 2024-03-25

## 2024-03-25 RX ORDER — MORPHINE SULFATE 4 MG/ML
4 INJECTION INTRAVENOUS ONCE
Status: COMPLETED | OUTPATIENT
Start: 2024-03-25 | End: 2024-03-25

## 2024-03-25 RX ADMIN — PROCHLORPERAZINE EDISYLATE 10 MG: 5 INJECTION INTRAMUSCULAR; INTRAVENOUS at 20:05

## 2024-03-25 RX ADMIN — KETOROLAC TROMETHAMINE 15 MG: 15 INJECTION, SOLUTION INTRAMUSCULAR; INTRAVENOUS at 20:04

## 2024-03-25 RX ADMIN — ONDANSETRON 4 MG: 2 INJECTION INTRAMUSCULAR; INTRAVENOUS at 20:05

## 2024-03-25 RX ADMIN — MORPHINE SULFATE 4 MG: 4 INJECTION, SOLUTION INTRAMUSCULAR; INTRAVENOUS at 21:56

## 2024-03-25 RX ADMIN — SODIUM CHLORIDE 1000 ML: 9 INJECTION, SOLUTION INTRAVENOUS at 20:05

## 2024-03-25 ASSESSMENT — FIBROSIS 4 INDEX: FIB4 SCORE: 0.41

## 2024-03-25 ASSESSMENT — PAIN DESCRIPTION - PAIN TYPE
TYPE: ACUTE PAIN
TYPE: ACUTE PAIN

## 2024-03-26 NOTE — ED PROVIDER NOTES
ER Provider Note    Scribed for Torey Mcmanus M.d. by Garth Hernandez. 3/25/2024  7:36 PM    Primary Care Provider: ANIKET Ellington    CHIEF COMPLAINT  Chief Complaint   Patient presents with    Headache     Pt reports HAx 4 days in front of head.  H/O migraines, he reports the HA is accompanied with diarrhea, nausea.      Flu Like Symptoms     X 1 week.  Pt reports a positive test by Dr Xuan avila provider last week.     EXTERNAL RECORDS REVIEWED  Inpatient Notes patient was admitted 2/6 and discharged 2/9 for cellulitis of the arm as well as chest pain.    HPI/ROS  LIMITATION TO HISTORY   Select: : None  OUTSIDE HISTORIAN(S):  None    Norm Prabhakar is a 43 y.o. male who presents to the ED for evaluation of headache onset 4 days ago. The patient reports associated symptoms of nausea, weakness, and vomiting since symptom onset. He reports his weakness has worsened to where he has experienced difficulty getting out of bed. He also reports sore throat for the last week. He reports a history of migraines. He denies seeing squiggly lines, flashes, or abnormal visions before his migraines started. Light and sounds exacerbate his headache. He reports baseline weakness right greater than left due to a previous stroke.  Patient tested positive for influenza a 5 days ago.  He reports a history of hypothyroidism, glaucoma, seizures. He reports allergies to Abilify, fish, Geodon, and hydroxyzine.    PAST MEDICAL HISTORY  Past Medical History:   Diagnosis Date    Anxiety     BIPOLAR    ASTHMA     Bipolar 1 disorder (HCC)     Depression     Diabetes ([facility])     Type II Diabetes    Fall     passed out 2 wks ago    Glaucoma     Glaucoma 1982    both eyes/ blind on left eye    Hypothyroidism     Indigestion     once in a while    Mental disorder     learning disabilities; speech impairment; developmental delays    Murmur     since birth    Pneumonia     remote    Psychiatric problem 2002    PTSD    S/P  thyroidectomy     Seizure (HCC) 2010    Seizure disorder (HCC)     Unspecified disorder of thyroid      SURGICAL HISTORY  Past Surgical History:   Procedure Laterality Date    EYE SURGERY      OTHER      Hernia Repair when he was 10 yrs old    THYROID LOBECTOMY       FAMILY HISTORY  Family History   Problem Relation Age of Onset    Hypertension Mother     Heart Disease Mother     Lung Disease Mother     Stroke Maternal Grandmother      SOCIAL HISTORY   reports that he quit smoking about 3 years ago. His smoking use included cigarettes and cigars. He has never used smokeless tobacco. He reports that he does not currently use alcohol. He reports that he does not currently use drugs after having used the following drugs: Inhaled.    CURRENT MEDICATIONS  Previous Medications    ASPIRIN 81 MG EC TABLET    Take 81 mg by mouth every day.    DIVALPROEX (DEPAKOTE) 500 MG TABLET DELAYED RESPONSE    Take 500-1,500 mg by mouth 2 times a day. 500 mg in AM and 1500 mg at night    FENOFIBRATE 200 MG CAP    Take 200 mg by mouth every day.    ICOSAPENT ETHYL (VASCEPA) 1 G CAP    Take 2 g by mouth 2 times a day.    INSULIN GLARGINE (LANTUS SOLOSTAR) 100 UNIT/ML SOLUTION PEN-INJECTOR INJECTION    Inject 32 Units under the skin 2 times a day for 30 days.    LATANOPROST (XALATAN) 0.005 % SOLUTION    Administer 1 Drop into both eyes every evening.    LEVOTHYROXINE (SYNTHROID) 200 MCG TAB    Take 200 mcg by mouth every morning on an empty stomach. Total of 225 mcg once a day    LEVOTHYROXINE (SYNTHROID) 25 MCG TAB    Take 25 mcg by mouth every morning on an empty stomach. Total of 225 mcg once a day    LURASIDONE (LATUDA) 80 MG TAB    Take 80 mg by mouth with dinner.    POLYETHYLENE GLYCOL/LYTES (MIRALAX) PACK    Mix and drink 1 Packet by mouth 2 times a day as needed (For constipation, hold for loose stools).    PRAZOSIN (MINIPRESS) 5 MG CAP    Take 5 mg by mouth every evening.    SERTRALINE (ZOLOFT) 100 MG TAB    Take 150 mg by mouth  every day.    TRAZODONE (DESYREL) 50 MG TAB    Take 50 mg by mouth every evening. Indications: Trouble Sleeping    VITAMIN D3 (CHOLECALCIFEROL) 1000 UNIT (25 MCG) TAB    Take 1,000 Units by mouth at bedtime.     ALLERGIES  Abilify, Fish, Geodon [ziprasidone hcl], Aripiprazole, Hydroxyzine, and Ziprasidone    PHYSICAL EXAM  BP (!) 139/94   Pulse 67   Temp 36 °C (96.8 °F) (Temporal)   Resp 14   Wt 118 kg (260 lb 2.3 oz)   SpO2 97%   BMI 30.06 kg/m²   Constitutional: Well developed, Well nourished, mild  distress.   HENT: Normocephalic, Atraumatic, no facial droop.   Eyes: Conjunctiva normal, No discharge. Pupils 3 mm reactive.   Neck: Supple, No stridor, no meningismus   Cardiovascular: Normal heart rate, Normal rhythm, No murmurs, equal pulses.   Pulmonary: Normal breath sounds, No respiratory distress, No wheezing, No rales, No rhonchi.  Chest: No chest wall tenderness or deformity.   Abdomen:Soft, No tenderness, No masses, no rebound, no guarding.   Back: No CVA tenderness.   Musculoskeletal: No major deformities noted, No tenderness.   Skin: Warm, Dry, No erythema, No rash.   Neurologic: Alert & oriented x 3, Normal motor function No facial droop. Little drift in the right arm and leg which he says is chronic. 4/5 strength in the right compared to left.   Psychiatric: Affect normal, Judgment normal, Mood normal.     DIAGNOSTIC STUDIES    Labs:   Results for orders placed or performed during the hospital encounter of 03/25/24   POC CoV-2, FLU A/B, RSV by PCR   Result Value Ref Range    POC Influenza A RNA, PCR Negative Negative    POC Influenza B RNA, PCR Negative Negative    POC RSV, by PCR Negative Negative    POC SARS-CoV-2, PCR NotDetected      COURSE & MEDICAL DECISION MAKING     ED Observation Status? No; Patient does not meet criteria for ED Observation.     INITIAL ASSESSMENT, COURSE AND PLAN  Care Narrative:   7:45 PM  - Patient seen and examined at bedside. CoV-2 Flu A/B and RSV PCR  ordered per  nursing protocol. The patient is a 43 y.o. man who presents to the ED for evaluate of headache and flu like symptoms onset 4 days ago. Discussed plan of care, including medication to treat patient symptoms and diagnostic workup to evaluate patient symptoms. Patient agrees to the plan of care. The patient will be medicated with Toradol 15 mg injection, Zofran 4 mg injection, Compazine 10 mg injection, and NS infusion 1000 mL.  Differential diagnoses include but are not limited to migraine vs. Headache.     9:24 PM - Patient was reevaluated at bedside. Patient reports no improvement in pain following medication. Ordered morphine 4 mg injection to treat patient symptoms.     10:10 PM - I reevaluated the patient at bedside. The patient informs me they feel improved following medication administration and verbalize readiness to return home. I discussed plan for discharge and follow up as outlined below. The patient is stable for discharge at this time and will return for any new or worsening symptoms. Patient verbalizes understanding and support with my plan for discharge.      HYDRATION: Based on the patient's presentation of Acute Vomiting the patient was given IV fluids. IV Hydration was used because oral hydration was not adequate alone. Upon recheck following hydration, the patient was improved.    PROBLEM LIST  Problem 1 headache patient presents with a history of migraines with a headache that started gradually.  Initially started on the frontal headache and now has become the whole left side.  Patient denies any aura.  He has no neck stiffness.  I suspect this headache is probably a migraine as well as caused by his influenza.  I do not think he has meningitis he has grown 2.2-year and no neck stiffness.  The headache was not thunderclap in nature not the worst headache of his life I do not think this is a subarachnoid hemorrhage.  Patient does have some baseline weakness from a previous stroke on the right side  I do not think this is new.    DISPOSITION AND DISCUSSIONS  I have discussed management of the patient with the following physicians and MANNY's:  None    Discussion of management with other [facility] or appropriate source(s): None     Escalation of care considered, and ultimately not performed: diagnostic imaging.  I do not think patient needs imaging or blood work at this time.  Barriers to care at this time, including but not limited to:  None known at this time .     Decision tools and prescription drugs considered including, but not limited to: Pain Medications patient was given migraine cocktail and morphine. .    The patient will return for new or worsening symptoms and is stable at the time of discharge.    The patient is referred to a primary physician for blood pressure management, diabetic screening, and for all other preventative health concerns.    DISPOSITION:  Patient will be discharged home in stable condition.    FOLLOW UP:  ANIKET Ellington  [address] [place] [id number]  [phone]    Schedule an appointment as soon as possible for a visit in 1 week        OUTPATIENT MEDICATIONS:  Discharge Medication List as of 3/25/2024 10:24 PM           FINAL DIANGOSIS  1. Migraine with aura and with status migrainosus, not intractable    2. Viral syndrome          IGarth (Robertibe), am scribing for, and in the presence of, MYRANDA Mills*.    Electronically signed by: Garth Hernandez (Stephanie), 3/25/2024    Torey ROB M.* personally performed the services described in this documentation, as scribed by Garth Hernandez in my presence, and it is both accurate and complete.     The note accurately reflects work and decisions made by me.  Torey Mcmanus M.D.  3/25/2024  11:18 PM

## 2024-03-26 NOTE — DISCHARGE INSTRUCTIONS
Return the emergency department if you have new or different headache, neck stiffness, fever, confusion or unilateral weakness it is worsening.

## 2024-03-26 NOTE — ED TRIAGE NOTES
Chief Complaint   Patient presents with    Headache     Pt reports HAx 4 days in front of head.  H/O migraines, he reports the HA is accompanied with diarrhea, nausea.      Flu Like Symptoms     X 1 week.  Pt reports a positive test by Dr Xuan avila provider last week.     Covid test provided in triage.

## 2024-03-26 NOTE — ED NOTES
Pt states to be feeling better.   .Pt stable for discharge. Pt educated and reviewed discharge instructions with RN. Pt verbalized understanding & all questions were answered. Pt AoX 4. Pt ambulated independently with balanced and steady gait out of the ED with all belongings. Pt encouraged to come back if symptoms worsen.

## 2024-04-18 ENCOUNTER — HOSPITAL ENCOUNTER (EMERGENCY)
Facility: MEDICAL CENTER | Age: 44
End: 2024-04-18
Attending: EMERGENCY MEDICINE
Payer: MEDICAID

## 2024-04-18 VITALS
DIASTOLIC BLOOD PRESSURE: 77 MMHG | OXYGEN SATURATION: 95 % | HEART RATE: 64 BPM | BODY MASS INDEX: 30.08 KG/M2 | HEIGHT: 78 IN | WEIGHT: 260 LBS | TEMPERATURE: 98 F | RESPIRATION RATE: 9 BRPM | SYSTOLIC BLOOD PRESSURE: 113 MMHG

## 2024-04-18 DIAGNOSIS — R73.9 HYPERGLYCEMIA: ICD-10-CM

## 2024-04-18 DIAGNOSIS — R53.83 FATIGUE, UNSPECIFIED TYPE: ICD-10-CM

## 2024-04-18 LAB
ALBUMIN SERPL BCP-MCNC: 4.7 G/DL (ref 3.2–4.9)
ALBUMIN/GLOB SERPL: 1.7 G/DL
ALP SERPL-CCNC: 117 U/L (ref 30–99)
ALT SERPL-CCNC: 45 U/L (ref 2–50)
ANION GAP SERPL CALC-SCNC: 12 MMOL/L (ref 7–16)
ANION GAP SERPL CALC-SCNC: 17 MMOL/L (ref 7–16)
AST SERPL-CCNC: 37 U/L (ref 12–45)
B-OH-BUTYR SERPL-MCNC: 0.24 MMOL/L (ref 0.02–0.27)
BASE EXCESS BLDV CALC-SCNC: -1 MMOL/L
BASOPHILS # BLD AUTO: 1.3 % (ref 0–1.8)
BASOPHILS # BLD: 0.11 K/UL (ref 0–0.12)
BILIRUB SERPL-MCNC: 0.5 MG/DL (ref 0.1–1.5)
BODY TEMPERATURE: 36.6 CENTIGRADE
BUN SERPL-MCNC: 11 MG/DL (ref 8–22)
BUN SERPL-MCNC: 13 MG/DL (ref 8–22)
CALCIUM ALBUM COR SERPL-MCNC: 8.4 MG/DL (ref 8.5–10.5)
CALCIUM SERPL-MCNC: 8.4 MG/DL (ref 8.5–10.5)
CALCIUM SERPL-MCNC: 9 MG/DL (ref 8.5–10.5)
CHLORIDE SERPL-SCNC: 89 MMOL/L (ref 96–112)
CHLORIDE SERPL-SCNC: 93 MMOL/L (ref 96–112)
CO2 SERPL-SCNC: 19 MMOL/L (ref 20–33)
CO2 SERPL-SCNC: 22 MMOL/L (ref 20–33)
CREAT SERPL-MCNC: 0.72 MG/DL (ref 0.5–1.4)
CREAT SERPL-MCNC: 0.92 MG/DL (ref 0.5–1.4)
EOSINOPHIL # BLD AUTO: 0.12 K/UL (ref 0–0.51)
EOSINOPHIL NFR BLD: 1.4 % (ref 0–6.9)
ERYTHROCYTE [DISTWIDTH] IN BLOOD BY AUTOMATED COUNT: 42.6 FL (ref 35.9–50)
GFR SERPLBLD CREATININE-BSD FMLA CKD-EPI: 107 ML/MIN/1.73 M 2
GFR SERPLBLD CREATININE-BSD FMLA CKD-EPI: 117 ML/MIN/1.73 M 2
GLOBULIN SER CALC-MCNC: 2.8 G/DL (ref 1.9–3.5)
GLUCOSE BLD STRIP.AUTO-MCNC: 384 MG/DL (ref 65–99)
GLUCOSE SERPL-MCNC: 314 MG/DL (ref 65–99)
GLUCOSE SERPL-MCNC: 374 MG/DL (ref 65–99)
HCO3 BLDV-SCNC: 25 MMOL/L (ref 24–28)
HCT VFR BLD AUTO: 40 % (ref 42–52)
HGB BLD-MCNC: 14.4 G/DL (ref 14–18)
IMM GRANULOCYTES # BLD AUTO: 0.06 K/UL (ref 0–0.11)
IMM GRANULOCYTES NFR BLD AUTO: 0.7 % (ref 0–0.9)
INHALED O2 FLOW RATE: ABNORMAL L/MIN
LYMPHOCYTES # BLD AUTO: 3.1 K/UL (ref 1–4.8)
LYMPHOCYTES NFR BLD: 35.8 % (ref 22–41)
MCH RBC QN AUTO: 30.9 PG (ref 27–33)
MCHC RBC AUTO-ENTMCNC: 36 G/DL (ref 32.3–36.5)
MCV RBC AUTO: 85.8 FL (ref 81.4–97.8)
MONOCYTES # BLD AUTO: 0.56 K/UL (ref 0–0.85)
MONOCYTES NFR BLD AUTO: 6.5 % (ref 0–13.4)
NEUTROPHILS # BLD AUTO: 4.7 K/UL (ref 1.82–7.42)
NEUTROPHILS NFR BLD: 54.3 % (ref 44–72)
NRBC # BLD AUTO: 0 K/UL
NRBC BLD-RTO: 0 /100 WBC (ref 0–0.2)
PCO2 BLDV: 42.8 MMHG (ref 41–51)
PCO2 TEMP ADJ BLDV: 42.1 MMHG (ref 41–51)
PH BLDV: 7.38 [PH] (ref 7.31–7.45)
PH TEMP ADJ BLDV: 7.39 [PH] (ref 7.31–7.45)
PLATELET # BLD AUTO: 278 K/UL (ref 164–446)
PMV BLD AUTO: 10.7 FL (ref 9–12.9)
PO2 BLDV: 19.7 MMHG (ref 25–40)
PO2 TEMP ADJ BLDV: 19.1 MMHG (ref 25–40)
POTASSIUM SERPL-SCNC: 3.9 MMOL/L (ref 3.6–5.5)
POTASSIUM SERPL-SCNC: 4.2 MMOL/L (ref 3.6–5.5)
PROT SERPL-MCNC: 7.5 G/DL (ref 6–8.2)
RBC # BLD AUTO: 4.66 M/UL (ref 4.7–6.1)
SAO2 % BLDV: 29.5 %
SODIUM SERPL-SCNC: 125 MMOL/L (ref 135–145)
SODIUM SERPL-SCNC: 127 MMOL/L (ref 135–145)
WBC # BLD AUTO: 8.7 K/UL (ref 4.8–10.8)

## 2024-04-18 PROCEDURE — 80053 COMPREHEN METABOLIC PANEL: CPT

## 2024-04-18 PROCEDURE — 82962 GLUCOSE BLOOD TEST: CPT

## 2024-04-18 PROCEDURE — 700105 HCHG RX REV CODE 258: Mod: UD | Performed by: EMERGENCY MEDICINE

## 2024-04-18 PROCEDURE — 82803 BLOOD GASES ANY COMBINATION: CPT

## 2024-04-18 PROCEDURE — 82010 KETONE BODYS QUAN: CPT

## 2024-04-18 PROCEDURE — 36415 COLL VENOUS BLD VENIPUNCTURE: CPT

## 2024-04-18 PROCEDURE — A9270 NON-COVERED ITEM OR SERVICE: HCPCS | Mod: UD | Performed by: EMERGENCY MEDICINE

## 2024-04-18 PROCEDURE — 80048 BASIC METABOLIC PNL TOTAL CA: CPT

## 2024-04-18 PROCEDURE — 700102 HCHG RX REV CODE 250 W/ 637 OVERRIDE(OP): Mod: UD | Performed by: EMERGENCY MEDICINE

## 2024-04-18 PROCEDURE — 96372 THER/PROPH/DIAG INJ SC/IM: CPT

## 2024-04-18 PROCEDURE — 99284 EMERGENCY DEPT VISIT MOD MDM: CPT

## 2024-04-18 PROCEDURE — 85025 COMPLETE CBC W/AUTO DIFF WBC: CPT

## 2024-04-18 RX ORDER — SODIUM CHLORIDE, SODIUM LACTATE, POTASSIUM CHLORIDE, CALCIUM CHLORIDE 600; 310; 30; 20 MG/100ML; MG/100ML; MG/100ML; MG/100ML
1000 INJECTION, SOLUTION INTRAVENOUS ONCE
Status: COMPLETED | OUTPATIENT
Start: 2024-04-18 | End: 2024-04-18

## 2024-04-18 RX ORDER — ACETAMINOPHEN 500 MG
1000 TABLET ORAL ONCE
Status: COMPLETED | OUTPATIENT
Start: 2024-04-18 | End: 2024-04-18

## 2024-04-18 RX ADMIN — SODIUM CHLORIDE, POTASSIUM CHLORIDE, SODIUM LACTATE AND CALCIUM CHLORIDE 1000 ML: 600; 310; 30; 20 INJECTION, SOLUTION INTRAVENOUS at 15:15

## 2024-04-18 RX ADMIN — INSULIN HUMAN 5 UNITS: 100 INJECTION, SOLUTION PARENTERAL at 15:01

## 2024-04-18 RX ADMIN — ACETAMINOPHEN 1000 MG: 500 TABLET, FILM COATED ORAL at 18:12

## 2024-04-18 RX ADMIN — SODIUM CHLORIDE, POTASSIUM CHLORIDE, SODIUM LACTATE AND CALCIUM CHLORIDE 1000 ML: 600; 310; 30; 20 INJECTION, SOLUTION INTRAVENOUS at 13:49

## 2024-04-18 ASSESSMENT — FIBROSIS 4 INDEX: FIB4 SCORE: 0.41

## 2024-04-18 NOTE — ED TRIAGE NOTES
"Chief Complaint   Patient presents with    High Blood Sugar     Reading of 403 at home, 384 in triage, pt endorses generalized weakness, states he is med compliant with insulin, no distress in triage     Pt ambulatory to triage for above complaints, VSS on RA, GCS 15, NAD.    Pt returned to [facility]. Educated on triage process and to inform staff of any changes.     /84   Pulse 85   Temp 36.6 °C (97.9 °F)   Resp 16   Ht 1.981 m (6' 6\")   Wt 118 kg (260 lb)   SpO2 98%   BMI 30.05 kg/m²     "

## 2024-04-18 NOTE — ED NOTES
Erp to bedside   LOV: 06/12/20    LAST LAB: n/a    LAST RX: 7/27/19? - prescribed by other MD?    Next OV: No future appointments.     PROTOCOL: n/a

## 2024-04-18 NOTE — ED PROVIDER NOTES
ED Provider Note    CHIEF COMPLAINT  Chief Complaint   Patient presents with    High Blood Sugar     Reading of 403 at home, 384 in triage, pt endorses generalized weakness, states he is med compliant with insulin, no distress in triage       EXTERNAL RECORDS REVIEWED  Inpatient Notes from admission January 2024 for DKA he was also seen in the office at [facility] on April 11 to establish care for long-term diabetes management    HPI/ROS  LIMITATION TO HISTORY   Select: : None  OUTSIDE HISTORIAN(S):  none    Norm Prabhakar is a 43 y.o. male who presents with concerns of his blood sugar as well as fatigue.  Patient reports that over the last few days he has felt more tired than usual.  He reports a generalized weakness although no focal weakness or numbness.  He also reports that when he checked his blood sugar this morning it was over 400, states has been 2-300 in the last few days as well.  He reports taking his medications as directed, no missed doses or change doses.  He reports no abdominal pain, nausea or vomiting.  No chest pain, cough, congestion, shortness of breath, fevers or chills.  No dysuria.  He does state he feels thirsty    PAST MEDICAL HISTORY   has a past medical history of Anxiety, ASTHMA, Bipolar 1 disorder ([facility]), Depression, Diabetes ([facility]), Fall, Glaucoma, Glaucoma (1982), Hypothyroidism, Indigestion, Mental disorder, Murmur, Pneumonia, Psychiatric problem (2002), S/P thyroidectomy, Seizure ([facility]) (2010), Seizure disorder ([facility]), and Unspecified disorder of thyroid.    SURGICAL HISTORY   has a past surgical history that includes eye surgery; thyroid lobectomy; and other.    FAMILY HISTORY  Family History   Problem Relation Age of Onset    Hypertension Mother     Heart Disease Mother     Lung Disease Mother     Stroke Maternal Grandmother        SOCIAL HISTORY  Social History     Tobacco Use    Smoking status: Former     Current packs/day: 0.00     Types: Cigarettes, Cigars     Quit date:  "2020     Years since quittin.0    Smokeless tobacco: Never   Vaping Use    Vaping Use: Every day    Start date: 2023    Substances: Nicotine, THC, CBD, Flavoring    Devices: Disposable, Pre-filled or refillable cartridge, Pre-filled pod   Substance and Sexual Activity    Alcohol use: Not Currently     Comment: occasionally    Drug use: Not Currently     Types: Inhaled     Comment: marijuana every few days    Sexual activity: Not Currently       CURRENT MEDICATIONS  Home Medications       Reviewed by Juan Nolan R.N. (Registered Nurse) on 24 at 1145  Med List Status: Not Addressed     Medication Last Dose Status   aspirin 81 MG EC tablet  Active   divalproex (DEPAKOTE) 500 MG Tablet Delayed Response  Active   Fenofibrate 200 MG Cap  Active   Icosapent Ethyl (VASCEPA) 1 g Cap  Active   latanoprost (XALATAN) 0.005 % Solution  Active   levothyroxine (SYNTHROID) 200 MCG Tab  Active   levothyroxine (SYNTHROID) 25 MCG Tab  Active   lurasidone (LATUDA) 80 MG Tab  Active   polyethylene glycol/lytes (MIRALAX) Pack  Active   prazosin (MINIPRESS) 5 MG Cap  Active   sertraline (ZOLOFT) 100 MG Tab  Active   traZODone (DESYREL) 50 MG Tab  Active   vitamin D3 (CHOLECALCIFEROL) 1000 Unit (25 mcg) Tab  Active                    ALLERGIES  Allergies   Allergen Reactions    Abilify      \"Feeling tired, like I don't even know whats going on around me\"    Fish      Pt reports salmon causes him to be sick to his stomach  Not listed on MAR noted 2/3/2021      Geodon [Ziprasidone Hcl] Anaphylaxis     Anaphylaxis per patient    Aripiprazole      \"I became lethargic.\"    Hydroxyzine Itching     Pt will only state \"I feel itchy when I take it    Ziprasidone        PHYSICAL EXAM  VITAL SIGNS: /77   Pulse 64   Temp 36.7 °C (98 °F)   Resp (!) 9   Ht 1.981 m (6' 6\")   Wt 118 kg (260 lb)   SpO2 95%   BMI 30.05 kg/m²      Pulse ox interpretation: I interpret this pulse ox as normal.  Constitutional: Alert   HENT: " No signs of trauma, Bilateral external ears normal, Nose normal.  Mucous membranes are tacky  Eyes: Pupils are equal and reactive, Conjunctiva normal, Non-icteric.   Neck: Normal range of motion, No tenderness, Supple, No stridor.   Cardiovascular: Regular rate and rhythm, no murmurs.   Thorax & Lungs: Normal breath sounds, No respiratory distress, No wheezing, No chest tenderness.   Abdomen: Bowel sounds normal, Soft, No tenderness, No masses, No pulsatile masses. No peritoneal signs.  Skin: Warm, Dry, No erythema, No rash.   Back: No bony tenderness, No CVA tenderness.   Extremities: Intact distal pulses, No edema, No tenderness, No cyanosis,    Musculoskeletal: Good range of motion in all major joints. No tenderness to palpation or major deformities noted.   Neurologic: Alert , speech is fluent, cranial nerves are intact, there is slight drift in the right upper extremity, patient reports this is chronic in review of his records this is confirmed as chronic as well.  No drift of the left upper extremity, intact  strength bilaterally.  Similarly slight drift with the right leg, also reported as chronic, no drift of the left leg.  Sensation is intact to light touch throughout  Normal sensory function  Psychiatric: Affect normal, Judgment normal, Mood normal.           EKG/LABS  Labs Reviewed   CBC WITH DIFFERENTIAL - Abnormal; Notable for the following components:       Result Value    RBC 4.66 (*)     Hematocrit 40.0 (*)     All other components within normal limits   COMP METABOLIC PANEL - Abnormal; Notable for the following components:    Sodium 125 (*)     Chloride 89 (*)     Co2 19 (*)     Anion Gap 17.0 (*)     Glucose 374 (*)     Correct Calcium 8.4 (*)     Alkaline Phosphatase 117 (*)     All other components within normal limits   VENOUS BLOOD GAS - Abnormal; Notable for the following components:    Venous Bg Po2 19.7 (*)     Venous Bg Po2 Temp Corrected 19.1 (*)     All other components within normal  limits   BASIC METABOLIC PANEL - Abnormal; Notable for the following components:    Sodium 127 (*)     Chloride 93 (*)     Glucose 314 (*)     Calcium 8.4 (*)     All other components within normal limits   POCT GLUCOSE DEVICE RESULTS - Abnormal; Notable for the following components:    POC Glucose, Blood 384 (*)     All other components within normal limits   BETA-HYDROXYBUTYRIC ACID   ESTIMATED GFR   ESTIMATED GFR   POCT GLUCOSE   POCT URINALYSIS       I have independently interpreted this EKG          COURSE & MEDICAL DECISION MAKING    ASSESSMENT, COURSE AND PLAN  Care Narrative: 1:30 PM  Patient is evaluated the bedside and chart is reviewed.  At this point consideration for hyperglycemia, diabetic ketoacidosis, electrolyte or other metabolic abnormality, dehydration.  He has no fevers or other infectious symptoms to suggest this is a source for his symptoms today although this was considered.  Have ordered for IV fluids, diagnostic labs    Patient is reevaluated, updated on results thus far, ordered for additional fluids, subcutaneous insulin.      Patient is reevaluated, he is comfortable, comfortable with discharge    Hydration: Based on the patient's presentation of Hyperglycemia the patient was given IV fluids. IV Hydration was used because oral hydration was not as rapid as required. Upon recheck following hydration, the patient was improved.          PROBLEMS MANAGED  # Hyperglycemia.  Patient presenting with concerns of blood sugar as well as his fatigue.  Was noted to be hyperglycemic here.  No gap acidosis or findings of DKA after fluids and insulin here.  EKG is normal.  No elevation in beta hydroxybutyrate.  No other significant electrolyte or metabolic derangement.  At this point feel he is appropriate for continued outpatient management.  He will follow-up with primary care to consider potentially adjusting his medications       DISPOSITION AND DISCUSSIONS    Barriers to care at this time,  including but not limited to:  none .     Decision tools and prescription drugs considered including, but not limited to: Medication modification considered however with his diabetes patient does have good outpatient follow-up to consider medication modification as needed .    The patient will return for new or worsening symptoms and is stable at the time of discharge.    The patient is referred to a primary physician for blood pressure management, diabetic screening, and for all other preventative health concerns.        DISPOSITION:  Patient will be discharged home in stable condition.    FOLLOW UP:  ANIKET Ellington  [address] [place] [id number] [phone]    In 1 week        OUTPATIENT MEDICATIONS:  New Prescriptions    No medications on file         FINAL DIAGNOSIS  1. Hyperglycemia    2. Fatigue, unspecified type           Electronically signed by: Sal Pereyra M.D., 4/18/2024 1:30 PM

## 2024-04-19 NOTE — DISCHARGE INSTRUCTIONS
Your blood sugar was high but there does not appear to be any complications such as acidosis from this.  Is important to maintain good blood sugar control and hydration.  Please take your medications as directed and follow-up with your primary care doctor as above.  Seek more immediate medical attention for any new vomiting, passing out or other concerns

## 2024-06-05 NOTE — ED PROVIDER NOTES
ED Provider Note    Scribed for Adonis Bustos M.D. by Gabrielle Schroeder. 4/15/2017, 10:46 PM.    Primary care provider: Long Linares M.D.  Means of arrival: walk-in  History obtained from: Patient  History limited by: none    CHIEF COMPLAINT  Chief Complaint   Patient presents with   • N/V   • Abdominal Pain       HPI  Norm Prabhakar is a 36 y.o. male who presents to the Emergency Department for nausea, vomiting, diarrhea and crampy abdominal pain onset 4 days ago. He has been unable to hold anything down since onset. Patient began to feel improved today, played basketball, and the symptoms returned in full severity. Patient takes daily thyroid, anxiety, depression, seizure, and anti-inflammatory medications with no recent changes to them. He is currently residing in [facility] secondary to seizure monitoring and has been there for over 1 year. Patient is blind in his left eye, secondary to surgery complications.  No complaints of fever, problems with urination, or recent traveling    REVIEW OF SYSTEMS  See HPI for further details. All other systems are negative.     PAST MEDICAL HISTORY   has a past medical history of ASTHMA; Glaucoma; Hypothyroidism; Depression; Anxiety; Seizure disorder (CMS-[facility]); Bipolar 1 disorder (CMS-HCC); S/P thyroidectomy; Murmur; Fall; Glaucoma (1982); Psychiatric problem (2002); Mental disorder; Seizure (CMS-Formerly [facility]) (2010); Pneumonia; Indigestion; and Unspecified disorder of thyroid.    SURGICAL HISTORY   has past surgical history that includes eye surgery; thyroid lobectomy; and other.    SOCIAL HISTORY  Social History   Substance Use Topics   • Smoking status: Former Smoker -- 0.25 packs/day for 4 years     Types: Cigars, Cigarettes     Quit date: 01/01/2014   • Smokeless tobacco: Never Used   • Alcohol Use: No      History   Drug Use No       FAMILY HISTORY  Family History   Problem Relation Age of Onset   • Hypertension Mother    • Heart Disease Mother    • Lung Disease Mother    •  Subjective   Prabhu Luong is a 18 y.o. male.     History of Present Illness   The patient is here today for CPE and lab work F/U. He feels his health is good. He does not exercise. He is in college, [employer]. He is an artist.       Anxiety- he was on guanfacine but it didn't help.   Tourette's- he reports he has a lot of tics, they dont always bother him.     [place]     Pediatric BMI = 95 %ile (Z= 1.62) based on CDC (Boys, 2-20 Years) BMI-for-age based on BMI available as of 6/5/2024.. BMI is >= 25 and <30. (Overweight) The following options were offered after discussion;: exercise counseling/recommendations and nutrition counseling/recommendations     Mom is concerned about diabetes, here to assist patient.     The following portions of the patient's history were reviewed and updated as appropriate: allergies, current medications, past family history, past medical history, past social history, past surgical history and problem list.    Review of Systems   Constitutional: Negative.  Negative for chills and fever.   HENT: Negative.  Negative for ear pain, rhinorrhea and sore throat.    Eyes: Negative.    Respiratory:  Negative for cough and shortness of breath.    Cardiovascular: Negative.    Gastrointestinal: Negative.    Endocrine: Negative for cold intolerance and heat intolerance.   Genitourinary:  Negative for decreased urine volume, difficulty urinating, discharge, dysuria, flank pain, frequency, genital sores, hematuria, penile pain, erectile dysfunction, testicular pain and urgency.   Musculoskeletal: Negative.    Skin: Negative.    Allergic/Immunologic: Negative for environmental allergies and food allergies.   Neurological: Negative.    Hematological: Negative.    Psychiatric/Behavioral:  Negative for dysphoric mood and suicidal ideas. The patient is nervous/anxious.        Objective   Physical Exam  Constitutional:       Appearance: Normal appearance. He is well-developed.   HENT:      Right Ear: Hearing,  "Stroke Maternal Grandmother        CURRENT MEDICATIONS  Reviewed.  See Encounter Summary.     ALLERGIES  Allergies   Allergen Reactions   • Abilify Unspecified     \"Feeling tired, like I don't even know whats going on around me\"   • Fish        PHYSICAL EXAM  VITAL SIGNS: /81 mmHg  Pulse 96  Temp(Src) 35.9 °C (96.7 °F) (Temporal)  Resp 18  Ht 1.981 m (6' 6\")  Wt 139.3 kg (307 lb 1.6 oz)  BMI 35.50 kg/m2  SpO2 95%    Constitutional: Alert in no apparent distress.  HENT: No signs of trauma, Bilateral external ears normal, Nose normal.   Eyes: Pupils are equal and reactive, Conjunctiva normal, Non-icteric.   Neck: Normal range of motion, No tenderness, Supple, No stridor.   Lymphatic: No lymphadenopathy noted.   Cardiovascular: Regular rate and rhythm, no murmurs.   Thorax & Lungs: Normal breath sounds, No respiratory distress, No wheezing, No chest tenderness.   Abdomen: Bowel sounds normal, Soft, No tenderness, No masses, No pulsatile masses. No peritoneal signs.  Skin: Warm, Dry, No erythema, No rash.   Back: No bony tenderness, No CVA tenderness.   Extremities: Intact distal pulses, No edema, No tenderness, No cyanosis  Musculoskeletal: Good range of motion in all major joints. No tenderness to palpation or major deformities noted.   Neurologic: Alert , Normal motor function, Normal sensory function, No focal deficits noted.   Psychiatric: Affect normal, Judgment normal, Mood normal.     DIAGNOSTIC STUDIES / PROCEDURES     LABS  Results for orders placed or performed during the hospital encounter of 04/15/17   CBC WITH DIFFERENTIAL   Result Value Ref Range    WBC 11.4 (H) 4.8 - 10.8 K/uL    RBC 4.69 (L) 4.70 - 6.10 M/uL    Hemoglobin 14.3 14.0 - 18.0 g/dL    Hematocrit 42.5 42.0 - 52.0 %    MCV 90.6 81.4 - 97.8 fL    MCH 30.5 27.0 - 33.0 pg    MCHC 33.6 (L) 33.7 - 35.3 g/dL    RDW 43.2 35.9 - 50.0 fL    Platelet Count 339 164 - 446 K/uL    MPV 10.0 9.0 - 12.9 fL    Neutrophils-Polys 59.60 44.00 - 72.00 " tympanic membrane, ear canal and external ear normal.      Left Ear: Hearing, tympanic membrane, ear canal and external ear normal.      Nose: Nose normal.      Mouth/Throat:      Pharynx: Uvula midline.   Eyes:      General: Lids are normal.      Conjunctiva/sclera: Conjunctivae normal.      Pupils: Pupils are equal, round, and reactive to light.   Neck:      Thyroid: No thyromegaly.      Vascular: No carotid bruit.      Trachea: Trachea normal.   Cardiovascular:      Rate and Rhythm: Normal rate and regular rhythm.      Heart sounds: Normal heart sounds.   Pulmonary:      Effort: Pulmonary effort is normal.      Breath sounds: Normal breath sounds.   Abdominal:      General: Bowel sounds are normal.      Palpations: Abdomen is soft.      Tenderness: There is no abdominal tenderness.   Musculoskeletal:         General: Normal range of motion.      Cervical back: Normal range of motion and neck supple.   Lymphadenopathy:      Cervical: No cervical adenopathy.      Upper Body:      Right upper body: No supraclavicular adenopathy.      Left upper body: No supraclavicular adenopathy.   Skin:     General: Skin is warm and dry.   Neurological:      Mental Status: He is alert and oriented to person, place, and time.      Cranial Nerves: No cranial nerve deficit.      Sensory: No sensory deficit.      Deep Tendon Reflexes:      Reflex Scores:       Patellar reflexes are 1+ on the right side and 1+ on the left side.  Psychiatric:         Speech: Speech normal.         Behavior: Behavior normal.         Thought Content: Thought content normal.         Judgment: Judgment normal.         Vitals:    06/05/24 1440   BP: 102/62   Pulse: 95   SpO2: 98%     Body mass index is 29.48 kg/m².      Current Outpatient Medications:     sertraline (ZOLOFT) 50 MG tablet, Take 1 tablet by mouth Every Night., Disp: , Rfl:    Assessment & Plan   Diagnoses and all orders for this visit:    1. Health care maintenance (Primary)  -     CBC &  %    Lymphocytes 28.30 22.00 - 41.00 %    Monocytes 9.00 0.00 - 13.40 %    Eosinophils 1.60 0.00 - 6.90 %    Basophils 1.00 0.00 - 1.80 %    Immature Granulocytes 0.50 0.00 - 0.90 %    Nucleated RBC 0.00 /100 WBC    Neutrophils (Absolute) 6.82 1.82 - 7.42 K/uL    Lymphs (Absolute) 3.23 1.00 - 4.80 K/uL    Monos (Absolute) 1.03 (H) 0.00 - 0.85 K/uL    Eos (Absolute) 0.18 0.00 - 0.51 K/uL    Baso (Absolute) 0.11 0.00 - 0.12 K/uL    Immature Granulocytes (abs) 0.06 0.00 - 0.11 K/uL    NRBC (Absolute) 0.00 K/uL   COMP METABOLIC PANEL   Result Value Ref Range    Sodium 138 135 - 145 mmol/L    Potassium 3.5 (L) 3.6 - 5.5 mmol/L    Chloride 100 96 - 112 mmol/L    Co2 27 20 - 33 mmol/L    Anion Gap 11.0 0.0 - 11.9    Glucose 106 (H) 65 - 99 mg/dL    Bun 22 8 - 22 mg/dL    Creatinine 0.96 0.50 - 1.40 mg/dL    Calcium 9.5 8.5 - 10.5 mg/dL    AST(SGOT) 24 12 - 45 U/L    ALT(SGPT) 24 2 - 50 U/L    Alkaline Phosphatase 69 30 - 99 U/L    Total Bilirubin 0.8 0.1 - 1.5 mg/dL    Albumin 4.8 3.2 - 4.9 g/dL    Total Protein 7.7 6.0 - 8.2 g/dL    Globulin 2.9 1.9 - 3.5 g/dL    A-G Ratio 1.7 g/dL   LIPASE   Result Value Ref Range    Lipase 15 11 - 82 U/L   ESTIMATED GFR   Result Value Ref Range    GFR If African American >60 >60 mL/min/1.73 m 2    GFR If Non African American >60 >60 mL/min/1.73 m 2   URINALYSIS,CULTURE IF INDICATED   Result Value Ref Range    Color Yellow     Character Clear     Specific Gravity 1.024 <1.035    Ph 6.0 5.0-8.0    Glucose Negative Negative mg/dL    Ketones Negative Negative mg/dL    Protein Negative Negative mg/dL    Bilirubin Negative Negative    Nitrite Negative Negative    Leukocyte Esterase Negative Negative    Occult Blood Negative Negative    Micro Urine Req see below     Culture Indicated No UA Culture   EKG (NOW)   Result Value Ref Range    Report       [facility] Emergency Dept.    Test Date:  2017-04-15  Pt Name:    HOLLY KIM                 Department: ER  MRN:         Differential  -     Comprehensive Metabolic Panel  -     Hemoglobin A1c  -     Lipid Panel With LDL / HDL Ratio  -     TSH Rfx On Abnormal To Free T4  -     Hepatitis C Antibody    2. Family history of diabetes mellitus  -     CBC & Differential  -     Comprehensive Metabolic Panel  -     Hemoglobin A1c  -     Lipid Panel With LDL / HDL Ratio  -     TSH Rfx On Abnormal To Free T4  -     Hepatitis C Antibody    3. Tourette's  -     CBC & Differential  -     Comprehensive Metabolic Panel  -     Hemoglobin A1c  -     Lipid Panel With LDL / HDL Ratio  -     TSH Rfx On Abnormal To Free T4  -     Hepatitis C Antibody                 1. Preventative counseling- work on exercise and healthy diet, work on wt loss   2. Family history of diabetes-check A1C    Has had HPV vaccines    [id number]                      Room:        02  Gender:     M                            Technician: [id number]  :        1980                   Requested By:ER TRIAGE PROTOCOL  Order #:    [id number]                    Reading MD:    Measurements  Intervals                                Axis  Rate:       57                           P:          52  KS:         188                          QRS:        -5  QRSD:       116                          T:          37  QT:         428  QTc:        417    Interpretive Statements  SINUS BRADYCARDIA  NONSPECIFIC INTRAVENTRICULAR CONDUCTION DELAY  BORDERLINE LOW VOLTAGE IN FRONTAL LEADS  Compared to ECG 2017 15:13:14  Sinus rhythm no longer present         All labs were reviewed by me.    EKG  12 Lead EKG interpreted by me to show:  Sinus bradycardia  Rate 57  Axis: left deviation  Intervals: non specific interventricular conduction  No acute ST-T wave changes  No change from prior EKG from      RADIOLOGY  No orders to display     The radiologist's interpretation of all radiological studies and images have been reviewed by me.    COURSE & MEDICAL DECISION MAKING  Pertinent Labs & Imaging studies reviewed. (See chart for details)    Differential diagnoses include but are not limited to: viral GI illness    10:46 PM - Patient seen and examined at bedside. Patient will be treated with IV fluids for dehydration secondary to emesis, 4mg IV Zofran, and 20mg IV Bentyl. Ordered UA, estimated GFR, CBC, CMP, lipase, and EKG to evaluate his symptoms. I informed the patient that I would evaluate with labs to rule out any acute origins for his symptoms.    12:28 AM I re-evaluated patient at bedside and informed him all of his labs came back normal. He will be discharged with a prescription to help the nausea and abdominal pain.    Decision Making:  This is a 36 y.o. year old male who presents with nausea, vomiting, diarrhea for a few days. History and physical exam as above.  Patient states that he only came in as he was urged by his friends. Currently only minimal crampy abdominal pain. Patient overall clinically appearing well. Vital signs stable. Like slight sludge unremarkable. I do believe the patient likely has a viral gastritis. He do not believe the patient will require any imaging at this point specifically CT of the abdomen and pelvis given the abdominal cramping given his overall well appearance. He is encouraged to seek outpatient follow-up but is also monitoring return precautions if needed.     The patient will return for new or worsening symptoms and is stable at the time of discharge.    The patient is referred to a primary physician for blood pressure management, diabetic screening, and for all other preventative health concerns.    DISPOSITION:  Patient will be discharged home in stable condition.    FOLLOW UP:  Long Linares M.D.  [address] [facility] [address]  [place] [address]  [phone]            OUTPATIENT MEDICATIONS:  New Prescriptions    DICYCLOMINE (BENTYL) 20 MG TAB    Take 1 Tab by mouth every 6 hours.           FINAL IMPRESSION  1. Gastroenteritis          Gabrielle ROB (Scribe), am scribing for, and in the presence of, Adonis Bustos M.D..    Electronically signed by: Gabrielle Schroeder (Robertibcarmita), 4/15/2017    Adonis ROB M.D. personally performed the services described in this documentation, as scribed by Gabrielle Schroeder in my presence, and it is both accurate and complete.    The note accurately reflects work and decisions made by me.  Adonis Bustos  4/16/2017  1:41 AM

## 2024-07-03 ENCOUNTER — APPOINTMENT (OUTPATIENT)
Dept: RADIOLOGY | Facility: MEDICAL CENTER | Age: 44
End: 2024-07-03
Attending: STUDENT IN AN ORGANIZED HEALTH CARE EDUCATION/TRAINING PROGRAM
Payer: MEDICAID

## 2024-07-03 ENCOUNTER — HOSPITAL ENCOUNTER (EMERGENCY)
Facility: MEDICAL CENTER | Age: 44
End: 2024-07-03
Attending: STUDENT IN AN ORGANIZED HEALTH CARE EDUCATION/TRAINING PROGRAM
Payer: MEDICAID

## 2024-07-03 VITALS
BODY MASS INDEX: 28.57 KG/M2 | HEART RATE: 66 BPM | WEIGHT: 246.91 LBS | TEMPERATURE: 97.7 F | DIASTOLIC BLOOD PRESSURE: 81 MMHG | HEIGHT: 78 IN | OXYGEN SATURATION: 93 % | SYSTOLIC BLOOD PRESSURE: 151 MMHG | RESPIRATION RATE: 20 BRPM

## 2024-07-03 DIAGNOSIS — M79.671 RIGHT FOOT PAIN: ICD-10-CM

## 2024-07-03 DIAGNOSIS — R73.9 HYPERGLYCEMIA: ICD-10-CM

## 2024-07-03 LAB
ALBUMIN SERPL BCP-MCNC: 4.4 G/DL (ref 3.2–4.9)
ALBUMIN/GLOB SERPL: 1.7 G/DL
ALP SERPL-CCNC: 121 U/L (ref 30–99)
ALT SERPL-CCNC: 27 U/L (ref 2–50)
ANION GAP SERPL CALC-SCNC: 16 MMOL/L (ref 7–16)
AST SERPL-CCNC: 18 U/L (ref 12–45)
B-OH-BUTYR SERPL-MCNC: 0.22 MMOL/L (ref 0.02–0.27)
BASE EXCESS BLDV CALC-SCNC: -2 MMOL/L
BASOPHILS # BLD AUTO: 1.3 % (ref 0–1.8)
BASOPHILS # BLD: 0.1 K/UL (ref 0–0.12)
BILIRUB SERPL-MCNC: 0.3 MG/DL (ref 0.1–1.5)
BODY TEMPERATURE: 36.8 CENTIGRADE
BUN SERPL-MCNC: 26 MG/DL (ref 8–22)
CALCIUM ALBUM COR SERPL-MCNC: 9.2 MG/DL (ref 8.5–10.5)
CALCIUM SERPL-MCNC: 9.5 MG/DL (ref 8.5–10.5)
CHLORIDE SERPL-SCNC: 101 MMOL/L (ref 96–112)
CO2 SERPL-SCNC: 19 MMOL/L (ref 20–33)
CREAT SERPL-MCNC: 0.87 MG/DL (ref 0.5–1.4)
EOSINOPHIL # BLD AUTO: 0.05 K/UL (ref 0–0.51)
EOSINOPHIL NFR BLD: 0.6 % (ref 0–6.9)
ERYTHROCYTE [DISTWIDTH] IN BLOOD BY AUTOMATED COUNT: 48.7 FL (ref 35.9–50)
EST. AVERAGE GLUCOSE BLD GHB EST-MCNC: 278 MG/DL
GFR SERPLBLD CREATININE-BSD FMLA CKD-EPI: 110 ML/MIN/1.73 M 2
GLOBULIN SER CALC-MCNC: 2.6 G/DL (ref 1.9–3.5)
GLUCOSE BLD STRIP.AUTO-MCNC: 276 MG/DL (ref 65–99)
GLUCOSE SERPL-MCNC: 314 MG/DL (ref 65–99)
HBA1C MFR BLD: 11.3 % (ref 4–5.6)
HCO3 BLDV-SCNC: 21 MMOL/L (ref 24–28)
HCT VFR BLD AUTO: 32.9 % (ref 42–52)
HGB BLD-MCNC: 11.2 G/DL (ref 14–18)
IMM GRANULOCYTES # BLD AUTO: 0.09 K/UL (ref 0–0.11)
IMM GRANULOCYTES NFR BLD AUTO: 1.2 % (ref 0–0.9)
INHALED O2 FLOW RATE: ABNORMAL L/MIN
LYMPHOCYTES # BLD AUTO: 2.58 K/UL (ref 1–4.8)
LYMPHOCYTES NFR BLD: 33 % (ref 22–41)
MAGNESIUM SERPL-MCNC: 1.8 MG/DL (ref 1.5–2.5)
MCH RBC QN AUTO: 31.8 PG (ref 27–33)
MCHC RBC AUTO-ENTMCNC: 34 G/DL (ref 32.3–36.5)
MCV RBC AUTO: 93.5 FL (ref 81.4–97.8)
MONOCYTES # BLD AUTO: 1.01 K/UL (ref 0–0.85)
MONOCYTES NFR BLD AUTO: 12.9 % (ref 0–13.4)
NEUTROPHILS # BLD AUTO: 3.99 K/UL (ref 1.82–7.42)
NEUTROPHILS NFR BLD: 51 % (ref 44–72)
NRBC # BLD AUTO: 0 K/UL
NRBC BLD-RTO: 0 /100 WBC (ref 0–0.2)
PCO2 BLDV: 29.8 MMHG (ref 41–51)
PCO2 TEMP ADJ BLDV: 29.5 MMHG (ref 41–51)
PH BLDV: 7.46 [PH] (ref 7.31–7.45)
PH TEMP ADJ BLDV: 7.46 [PH] (ref 7.31–7.45)
PHOSPHATE SERPL-MCNC: 2.9 MG/DL (ref 2.5–4.5)
PLATELET # BLD AUTO: 188 K/UL (ref 164–446)
PMV BLD AUTO: 9.9 FL (ref 9–12.9)
PO2 BLDV: 61.3 MMHG (ref 25–40)
PO2 TEMP ADJ BLDV: 60.5 MMHG (ref 25–40)
POTASSIUM SERPL-SCNC: 4.1 MMOL/L (ref 3.6–5.5)
PROT SERPL-MCNC: 7 G/DL (ref 6–8.2)
RBC # BLD AUTO: 3.52 M/UL (ref 4.7–6.1)
SAO2 % BLDV: 90.6 %
SODIUM SERPL-SCNC: 136 MMOL/L (ref 135–145)
WBC # BLD AUTO: 7.8 K/UL (ref 4.8–10.8)

## 2024-07-03 PROCEDURE — 700105 HCHG RX REV CODE 258: Mod: UD | Performed by: STUDENT IN AN ORGANIZED HEALTH CARE EDUCATION/TRAINING PROGRAM

## 2024-07-03 PROCEDURE — 83735 ASSAY OF MAGNESIUM: CPT

## 2024-07-03 PROCEDURE — 80053 COMPREHEN METABOLIC PANEL: CPT

## 2024-07-03 PROCEDURE — 36415 COLL VENOUS BLD VENIPUNCTURE: CPT

## 2024-07-03 PROCEDURE — 82962 GLUCOSE BLOOD TEST: CPT

## 2024-07-03 PROCEDURE — 82803 BLOOD GASES ANY COMBINATION: CPT

## 2024-07-03 PROCEDURE — 84100 ASSAY OF PHOSPHORUS: CPT

## 2024-07-03 PROCEDURE — 85025 COMPLETE CBC W/AUTO DIFF WBC: CPT

## 2024-07-03 PROCEDURE — 82010 KETONE BODYS QUAN: CPT

## 2024-07-03 PROCEDURE — 99283 EMERGENCY DEPT VISIT LOW MDM: CPT

## 2024-07-03 PROCEDURE — 83036 HEMOGLOBIN GLYCOSYLATED A1C: CPT

## 2024-07-03 PROCEDURE — 73620 X-RAY EXAM OF FOOT: CPT | Mod: RT

## 2024-07-03 RX ORDER — SODIUM CHLORIDE, SODIUM LACTATE, POTASSIUM CHLORIDE, CALCIUM CHLORIDE 600; 310; 30; 20 MG/100ML; MG/100ML; MG/100ML; MG/100ML
1000 INJECTION, SOLUTION INTRAVENOUS ONCE
Status: COMPLETED | OUTPATIENT
Start: 2024-07-03 | End: 2024-07-03

## 2024-07-03 RX ADMIN — SODIUM CHLORIDE, POTASSIUM CHLORIDE, SODIUM LACTATE AND CALCIUM CHLORIDE 1000 ML: 600; 310; 30; 20 INJECTION, SOLUTION INTRAVENOUS at 03:25

## 2024-07-03 ASSESSMENT — FIBROSIS 4 INDEX: FIB4 SCORE: 1.06

## 2024-08-05 ENCOUNTER — APPOINTMENT (OUTPATIENT)
Dept: RADIOLOGY | Facility: MEDICAL CENTER | Age: 44
End: 2024-08-05
Attending: EMERGENCY MEDICINE
Payer: MEDICAID

## 2024-08-05 ENCOUNTER — HOSPITAL ENCOUNTER (OUTPATIENT)
Facility: MEDICAL CENTER | Age: 44
End: 2024-08-10
Attending: EMERGENCY MEDICINE | Admitting: INTERNAL MEDICINE
Payer: MEDICAID

## 2024-08-05 DIAGNOSIS — F99 PSYCHIATRIC PROBLEM: ICD-10-CM

## 2024-08-05 DIAGNOSIS — F41.9 ANXIETY AND DEPRESSION: ICD-10-CM

## 2024-08-05 DIAGNOSIS — F32.A ANXIETY AND DEPRESSION: ICD-10-CM

## 2024-08-05 DIAGNOSIS — R56.9 SEIZURE (HCC): ICD-10-CM

## 2024-08-05 DIAGNOSIS — F43.10 POSTTRAUMATIC STRESS DISORDER: ICD-10-CM

## 2024-08-05 DIAGNOSIS — R53.1 WEAKNESS: ICD-10-CM

## 2024-08-05 DIAGNOSIS — R53.1 RIGHT SIDED WEAKNESS: ICD-10-CM

## 2024-08-05 PROBLEM — E78.49 OTHER HYPERLIPIDEMIA: Status: ACTIVE | Noted: 2018-08-19

## 2024-08-05 LAB
ABO GROUP BLD: NORMAL
ALBUMIN SERPL BCP-MCNC: 4.3 G/DL (ref 3.2–4.9)
ALBUMIN/GLOB SERPL: 2 G/DL
ALP SERPL-CCNC: 82 U/L (ref 30–99)
ALT SERPL-CCNC: 13 U/L (ref 2–50)
ANION GAP SERPL CALC-SCNC: 14 MMOL/L (ref 7–16)
APTT PPP: 27.6 SEC (ref 24.7–36)
AST SERPL-CCNC: 11 U/L (ref 12–45)
BASOPHILS # BLD AUTO: 1.8 % (ref 0–1.8)
BASOPHILS # BLD: 0.15 K/UL (ref 0–0.12)
BILIRUB SERPL-MCNC: 0.3 MG/DL (ref 0.1–1.5)
BLD GP AB SCN SERPL QL: NORMAL
BUN SERPL-MCNC: 26 MG/DL (ref 8–22)
CALCIUM ALBUM COR SERPL-MCNC: 8.8 MG/DL (ref 8.5–10.5)
CALCIUM SERPL-MCNC: 9 MG/DL (ref 8.5–10.5)
CHLORIDE SERPL-SCNC: 98 MMOL/L (ref 96–112)
CO2 SERPL-SCNC: 20 MMOL/L (ref 20–33)
CREAT SERPL-MCNC: 0.77 MG/DL (ref 0.5–1.4)
EKG IMPRESSION: NORMAL
EOSINOPHIL # BLD AUTO: 0.13 K/UL (ref 0–0.51)
EOSINOPHIL NFR BLD: 1.5 % (ref 0–6.9)
ERYTHROCYTE [DISTWIDTH] IN BLOOD BY AUTOMATED COUNT: 44.1 FL (ref 35.9–50)
EST. AVERAGE GLUCOSE BLD GHB EST-MCNC: 249 MG/DL
GFR SERPLBLD CREATININE-BSD FMLA CKD-EPI: 115 ML/MIN/1.73 M 2
GLOBULIN SER CALC-MCNC: 2.2 G/DL (ref 1.9–3.5)
GLUCOSE BLD STRIP.AUTO-MCNC: 200 MG/DL (ref 65–99)
GLUCOSE BLD STRIP.AUTO-MCNC: 241 MG/DL (ref 65–99)
GLUCOSE BLD STRIP.AUTO-MCNC: 348 MG/DL (ref 65–99)
GLUCOSE SERPL-MCNC: 351 MG/DL (ref 65–99)
HBA1C MFR BLD: 10.3 % (ref 4–5.6)
HCT VFR BLD AUTO: 34.8 % (ref 42–52)
HGB BLD-MCNC: 12 G/DL (ref 14–18)
IMM GRANULOCYTES # BLD AUTO: 0.09 K/UL (ref 0–0.11)
IMM GRANULOCYTES NFR BLD AUTO: 1.1 % (ref 0–0.9)
INR PPP: 0.95 (ref 0.87–1.13)
LYMPHOCYTES # BLD AUTO: 2.75 K/UL (ref 1–4.8)
LYMPHOCYTES NFR BLD: 32.3 % (ref 22–41)
MCH RBC QN AUTO: 31.2 PG (ref 27–33)
MCHC RBC AUTO-ENTMCNC: 34.5 G/DL (ref 32.3–36.5)
MCV RBC AUTO: 90.4 FL (ref 81.4–97.8)
MONOCYTES # BLD AUTO: 0.65 K/UL (ref 0–0.85)
MONOCYTES NFR BLD AUTO: 7.6 % (ref 0–13.4)
NEUTROPHILS # BLD AUTO: 4.75 K/UL (ref 1.82–7.42)
NEUTROPHILS NFR BLD: 55.7 % (ref 44–72)
NRBC # BLD AUTO: 0 K/UL
NRBC BLD-RTO: 0 /100 WBC (ref 0–0.2)
PLATELET # BLD AUTO: 227 K/UL (ref 164–446)
PMV BLD AUTO: 10.6 FL (ref 9–12.9)
POTASSIUM SERPL-SCNC: 4.7 MMOL/L (ref 3.6–5.5)
PROT SERPL-MCNC: 6.5 G/DL (ref 6–8.2)
PROTHROMBIN TIME: 12.8 SEC (ref 12–14.6)
RBC # BLD AUTO: 3.85 M/UL (ref 4.7–6.1)
RH BLD: NORMAL
SODIUM SERPL-SCNC: 132 MMOL/L (ref 135–145)
TROPONIN T SERPL-MCNC: 24 NG/L (ref 6–19)
WBC # BLD AUTO: 8.5 K/UL (ref 4.8–10.8)

## 2024-08-05 PROCEDURE — 84484 ASSAY OF TROPONIN QUANT: CPT

## 2024-08-05 PROCEDURE — 0042T CT-CEREBRAL PERFUSION ANALYSIS: CPT

## 2024-08-05 PROCEDURE — 96372 THER/PROPH/DIAG INJ SC/IM: CPT | Mod: XU

## 2024-08-05 PROCEDURE — 83036 HEMOGLOBIN GLYCOSYLATED A1C: CPT

## 2024-08-05 PROCEDURE — 85610 PROTHROMBIN TIME: CPT

## 2024-08-05 PROCEDURE — 85730 THROMBOPLASTIN TIME PARTIAL: CPT

## 2024-08-05 PROCEDURE — G0378 HOSPITAL OBSERVATION PER HR: HCPCS

## 2024-08-05 PROCEDURE — 700105 HCHG RX REV CODE 258: Mod: UD | Performed by: NURSE PRACTITIONER

## 2024-08-05 PROCEDURE — 94760 N-INVAS EAR/PLS OXIMETRY 1: CPT

## 2024-08-05 PROCEDURE — 80053 COMPREHEN METABOLIC PANEL: CPT

## 2024-08-05 PROCEDURE — 700102 HCHG RX REV CODE 250 W/ 637 OVERRIDE(OP): Mod: UD | Performed by: EMERGENCY MEDICINE

## 2024-08-05 PROCEDURE — 86900 BLOOD TYPING SEROLOGIC ABO: CPT

## 2024-08-05 PROCEDURE — 70496 CT ANGIOGRAPHY HEAD: CPT

## 2024-08-05 PROCEDURE — 70498 CT ANGIOGRAPHY NECK: CPT

## 2024-08-05 PROCEDURE — 86901 BLOOD TYPING SEROLOGIC RH(D): CPT

## 2024-08-05 PROCEDURE — 700102 HCHG RX REV CODE 250 W/ 637 OVERRIDE(OP): Mod: UD | Performed by: NURSE PRACTITIONER

## 2024-08-05 PROCEDURE — 93005 ELECTROCARDIOGRAM TRACING: CPT | Performed by: EMERGENCY MEDICINE

## 2024-08-05 PROCEDURE — 86850 RBC ANTIBODY SCREEN: CPT

## 2024-08-05 PROCEDURE — 700117 HCHG RX CONTRAST REV CODE 255: Mod: UD | Performed by: EMERGENCY MEDICINE

## 2024-08-05 PROCEDURE — 70450 CT HEAD/BRAIN W/O DYE: CPT

## 2024-08-05 PROCEDURE — 36415 COLL VENOUS BLD VENIPUNCTURE: CPT

## 2024-08-05 PROCEDURE — 71045 X-RAY EXAM CHEST 1 VIEW: CPT

## 2024-08-05 PROCEDURE — 99223 1ST HOSP IP/OBS HIGH 75: CPT | Performed by: INTERNAL MEDICINE

## 2024-08-05 PROCEDURE — 99285 EMERGENCY DEPT VISIT HI MDM: CPT

## 2024-08-05 PROCEDURE — 85025 COMPLETE CBC W/AUTO DIFF WBC: CPT

## 2024-08-05 PROCEDURE — 82962 GLUCOSE BLOOD TEST: CPT

## 2024-08-05 RX ORDER — HEPARIN SODIUM 5000 [USP'U]/ML
5000 INJECTION, SOLUTION INTRAVENOUS; SUBCUTANEOUS EVERY 8 HOURS
Status: DISCONTINUED | OUTPATIENT
Start: 2024-08-06 | End: 2024-08-09

## 2024-08-05 RX ORDER — MONTELUKAST SODIUM 10 MG/1
10 TABLET ORAL DAILY
Status: DISCONTINUED | OUTPATIENT
Start: 2024-08-05 | End: 2024-08-10 | Stop reason: HOSPADM

## 2024-08-05 RX ORDER — MONTELUKAST SODIUM 10 MG/1
10 TABLET ORAL DAILY
COMMUNITY

## 2024-08-05 RX ORDER — ALBUTEROL SULFATE 90 UG/1
2 INHALANT RESPIRATORY (INHALATION) EVERY 4 HOURS PRN
COMMUNITY

## 2024-08-05 RX ORDER — ASPIRIN 81 MG/1
81 TABLET, CHEWABLE ORAL DAILY
COMMUNITY

## 2024-08-05 RX ORDER — ASPIRIN 300 MG/1
300 SUPPOSITORY RECTAL DAILY
Status: DISCONTINUED | OUTPATIENT
Start: 2024-08-06 | End: 2024-08-10 | Stop reason: HOSPADM

## 2024-08-05 RX ORDER — ATORVASTATIN CALCIUM 40 MG/1
40 TABLET, FILM COATED ORAL NIGHTLY
Status: DISCONTINUED | OUTPATIENT
Start: 2024-08-05 | End: 2024-08-10 | Stop reason: HOSPADM

## 2024-08-05 RX ORDER — TRAZODONE HYDROCHLORIDE 50 MG/1
50 TABLET, FILM COATED ORAL
Status: DISCONTINUED | OUTPATIENT
Start: 2024-08-05 | End: 2024-08-10 | Stop reason: HOSPADM

## 2024-08-05 RX ORDER — ALBUTEROL SULFATE 90 UG/1
2 INHALANT RESPIRATORY (INHALATION)
Status: DISCONTINUED | OUTPATIENT
Start: 2024-08-05 | End: 2024-08-10 | Stop reason: HOSPADM

## 2024-08-05 RX ORDER — LURASIDONE HYDROCHLORIDE 40 MG/1
80 TABLET, FILM COATED ORAL
Status: DISCONTINUED | OUTPATIENT
Start: 2024-08-06 | End: 2024-08-10 | Stop reason: HOSPADM

## 2024-08-05 RX ORDER — ASPIRIN 81 MG/1
81 TABLET, CHEWABLE ORAL DAILY
Status: DISCONTINUED | OUTPATIENT
Start: 2024-08-06 | End: 2024-08-07

## 2024-08-05 RX ORDER — LORATADINE 10 MG/1
10 TABLET ORAL DAILY
Status: DISCONTINUED | OUTPATIENT
Start: 2024-08-06 | End: 2024-08-10 | Stop reason: HOSPADM

## 2024-08-05 RX ORDER — ATORVASTATIN CALCIUM 40 MG/1
40 TABLET, FILM COATED ORAL NIGHTLY
COMMUNITY

## 2024-08-05 RX ORDER — PRAZOSIN HYDROCHLORIDE 5 MG/1
5 CAPSULE ORAL NIGHTLY
Status: DISCONTINUED | OUTPATIENT
Start: 2024-08-05 | End: 2024-08-10 | Stop reason: HOSPADM

## 2024-08-05 RX ORDER — ZONISAMIDE 100 MG/1
200 CAPSULE ORAL NIGHTLY PRN
COMMUNITY

## 2024-08-05 RX ORDER — SODIUM CHLORIDE 9 MG/ML
1000 INJECTION, SOLUTION INTRAVENOUS ONCE
Status: COMPLETED | OUTPATIENT
Start: 2024-08-05 | End: 2024-08-05

## 2024-08-05 RX ORDER — SODIUM CHLORIDE 9 MG/ML
INJECTION, SOLUTION INTRAVENOUS CONTINUOUS
Status: DISCONTINUED | OUTPATIENT
Start: 2024-08-05 | End: 2024-08-06

## 2024-08-05 RX ORDER — LEVOTHYROXINE SODIUM 200 UG/1
200 TABLET ORAL
Status: DISCONTINUED | OUTPATIENT
Start: 2024-08-06 | End: 2024-08-10 | Stop reason: HOSPADM

## 2024-08-05 RX ORDER — ASPIRIN 81 MG/1
81 TABLET, CHEWABLE ORAL DAILY
Status: DISCONTINUED | OUTPATIENT
Start: 2024-08-06 | End: 2024-08-10 | Stop reason: HOSPADM

## 2024-08-05 RX ORDER — LORATADINE 10 MG/1
10 TABLET ORAL DAILY
COMMUNITY

## 2024-08-05 RX ORDER — INSULIN GLARGINE 100 [IU]/ML
40 INJECTION, SOLUTION SUBCUTANEOUS EVERY EVENING
COMMUNITY

## 2024-08-05 RX ORDER — ACETAMINOPHEN 325 MG/1
650 TABLET ORAL EVERY 6 HOURS PRN
Status: DISCONTINUED | OUTPATIENT
Start: 2024-08-05 | End: 2024-08-10 | Stop reason: HOSPADM

## 2024-08-05 RX ORDER — VITAMIN B COMPLEX
1000 TABLET ORAL
Status: DISCONTINUED | OUTPATIENT
Start: 2024-08-05 | End: 2024-08-10 | Stop reason: HOSPADM

## 2024-08-05 RX ORDER — LEVOTHYROXINE SODIUM 25 UG/1
25 TABLET ORAL
Status: DISCONTINUED | OUTPATIENT
Start: 2024-08-06 | End: 2024-08-10 | Stop reason: HOSPADM

## 2024-08-05 RX ORDER — INSULIN LISPRO 100 [IU]/ML
2-9 INJECTION, SOLUTION INTRAVENOUS; SUBCUTANEOUS
COMMUNITY

## 2024-08-05 RX ADMIN — SODIUM CHLORIDE: 9 INJECTION, SOLUTION INTRAVENOUS at 18:16

## 2024-08-05 RX ADMIN — INSULIN GLARGINE-YFGN 40 UNITS: 100 INJECTION, SOLUTION SUBCUTANEOUS at 21:04

## 2024-08-05 RX ADMIN — INSULIN HUMAN 5 UNITS: 100 INJECTION, SOLUTION PARENTERAL at 17:14

## 2024-08-05 RX ADMIN — IOHEXOL 40 ML: 350 INJECTION, SOLUTION INTRAVENOUS at 13:57

## 2024-08-05 RX ADMIN — SODIUM CHLORIDE 1000 ML: 9 INJECTION, SOLUTION INTRAVENOUS at 17:15

## 2024-08-05 RX ADMIN — IOHEXOL 80 ML: 350 INJECTION, SOLUTION INTRAVENOUS at 13:59

## 2024-08-05 ASSESSMENT — ENCOUNTER SYMPTOMS
WEAKNESS: 1
WHEEZING: 0
NAUSEA: 0
HEADACHES: 0
ABDOMINAL PAIN: 0
HEARTBURN: 0
DIARRHEA: 0
BLURRED VISION: 0
FLANK PAIN: 0
COUGH: 0
VOMITING: 0
DOUBLE VISION: 0
WEIGHT LOSS: 0
NERVOUS/ANXIOUS: 0
MYALGIAS: 0
CONSTIPATION: 0
STRIDOR: 0
FEVER: 0
DEPRESSION: 0
PALPITATIONS: 0
SORE THROAT: 0
CHILLS: 0
DIZZINESS: 0
SHORTNESS OF BREATH: 0

## 2024-08-05 ASSESSMENT — SOCIAL DETERMINANTS OF HEALTH (SDOH)
IN THE PAST 12 MONTHS, HAS THE ELECTRIC, GAS, OIL, OR WATER COMPANY THREATENED TO SHUT OFF SERVICE IN YOUR HOME?: NO
WITHIN THE LAST YEAR, HAVE YOU BEEN HUMILIATED OR EMOTIONALLY ABUSED IN OTHER WAYS BY YOUR PARTNER OR EX-PARTNER?: NO
WITHIN THE LAST YEAR, HAVE YOU BEEN AFRAID OF YOUR PARTNER OR EX-PARTNER?: NO
WITHIN THE PAST 12 MONTHS, YOU WORRIED THAT YOUR FOOD WOULD RUN OUT BEFORE YOU GOT THE MONEY TO BUY MORE: SOMETIMES TRUE
WITHIN THE LAST YEAR, HAVE YOU BEEN KICKED, HIT, SLAPPED, OR OTHERWISE PHYSICALLY HURT BY YOUR PARTNER OR EX-PARTNER?: NO
WITHIN THE PAST 12 MONTHS, THE FOOD YOU BOUGHT JUST DIDN'T LAST AND YOU DIDN'T HAVE MONEY TO GET MORE: SOMETIMES TRUE
WITHIN THE LAST YEAR, HAVE TO BEEN RAPED OR FORCED TO HAVE ANY KIND OF SEXUAL ACTIVITY BY YOUR PARTNER OR EX-PARTNER?: NO

## 2024-08-05 ASSESSMENT — PAIN DESCRIPTION - PAIN TYPE
TYPE: ACUTE PAIN
TYPE: ACUTE PAIN

## 2024-08-05 ASSESSMENT — LIFESTYLE VARIABLES
CONSUMPTION TOTAL: NEGATIVE
DOES PATIENT WANT TO STOP DRINKING: NO
HAVE YOU EVER FELT YOU SHOULD CUT DOWN ON YOUR DRINKING: NO
ON A TYPICAL DAY WHEN YOU DRINK ALCOHOL HOW MANY DRINKS DO YOU HAVE: 2
HAVE PEOPLE ANNOYED YOU BY CRITICIZING YOUR DRINKING: NO
TOTAL SCORE: 0
AVERAGE NUMBER OF DAYS PER WEEK YOU HAVE A DRINK CONTAINING ALCOHOL: 3
TOTAL SCORE: 0
EVER FELT BAD OR GUILTY ABOUT YOUR DRINKING: NO
EVER HAD A DRINK FIRST THING IN THE MORNING TO STEADY YOUR NERVES TO GET RID OF A HANGOVER: NO
HOW MANY TIMES IN THE PAST YEAR HAVE YOU HAD 5 OR MORE DRINKS IN A DAY: 0
TOTAL SCORE: 0
ALCOHOL_USE: YES

## 2024-08-05 ASSESSMENT — FIBROSIS 4 INDEX
FIB4 SCORE: 0.77
FIB4 SCORE: 0.56

## 2024-08-05 NOTE — ED PROVIDER NOTES
"ED Provider Note    CHIEF COMPLAINT  Chief Complaint   Patient presents with    Unilateral Weakness     Pt reports right sided weakness, began around noon today. Hx stroke with R sided deficits. States he normally is weak at baseline but began much worse today.     High Blood Sugar     Pt reports he has not had his insulin or metformin for 2 days.  per EMS       EXTERNAL RECORDS REVIEWED  Outpatient Notes   previous records, [facility], Gurwinder Burnett    HPI/ROS  LIMITATION TO HISTORY   Select: : None  OUTSIDE HISTORIAN(S):  None    Norm Prabhakar is a 44 y.o. male who presents here for evaluation of right sided weakness.  Patient states that he has history of stroke that is left him with some right-sided residual effects, but today \"he was having trouble moving his arm\" and states that today, at noon, he was 'unable to raise' his arms.  Pt has no fall or trauma.  He has no cp, no sob, no abdominal pain, no back pain.      PAST MEDICAL HISTORY   has a past medical history of Anxiety, ASTHMA, Bipolar 1 disorder (), Depression, Diabetes (), Fall, Glaucoma, Glaucoma (), Hypothyroidism, Indigestion, Mental disorder, Murmur, Pneumonia, Psychiatric problem (), S/P thyroidectomy, Seizure (HCC) (), Seizure disorder ([facility]), and Unspecified disorder of thyroid.    SURGICAL HISTORY   has a past surgical history that includes eye surgery; thyroid lobectomy; and other.    FAMILY HISTORY  Family History   Problem Relation Age of Onset    Hypertension Mother     Heart Disease Mother     Lung Disease Mother     Stroke Maternal Grandmother        SOCIAL HISTORY  Social History     Tobacco Use    Smoking status: Former     Current packs/day: 0.00     Types: Cigarettes, Cigars     Quit date: 2020     Years since quittin.3    Smokeless tobacco: Never   Vaping Use    Vaping status: Every Day    Start date: 2023    Substances: Nicotine, THC, CBD, Flavoring    Devices: Disposable, Pre-filled or " "refillable cartridge, Pre-filled pod   Substance and Sexual Activity    Alcohol use: Not Currently     Comment: occasionally    Drug use: Not Currently     Types: Inhaled     Comment: marijuana every few days    Sexual activity: Not Currently       CURRENT MEDICATIONS  Home Medications       Reviewed by Lola Schwarz R.N. (Registered Nurse) on 08/05/24 at 1320  Med List Status: Partial     Medication Last Dose Status   aspirin 81 MG EC tablet  Active   divalproex (DEPAKOTE) 500 MG Tablet Delayed Response  Active   Fenofibrate 200 MG Cap  Active   Icosapent Ethyl (VASCEPA) 1 g Cap  Active   latanoprost (XALATAN) 0.005 % Solution  Active   levothyroxine (SYNTHROID) 200 MCG Tab  Active   levothyroxine (SYNTHROID) 25 MCG Tab  Active   lurasidone (LATUDA) 80 MG Tab  Active   polyethylene glycol/lytes (MIRALAX) Pack  Active   prazosin (MINIPRESS) 5 MG Cap  Active   sertraline (ZOLOFT) 100 MG Tab  Active   traZODone (DESYREL) 50 MG Tab  Active   vitamin D3 (CHOLECALCIFEROL) 1000 Unit (25 mcg) Tab  Active                    ALLERGIES  Allergies   Allergen Reactions    Abilify      \"Feeling tired, like I don't even know whats going on around me\"    Fish      Pt reports salmon causes him to be sick to his stomach  Not listed on MAR noted 2/3/2021      Geodon [Ziprasidone Hcl] Anaphylaxis     Anaphylaxis per patient    Aripiprazole      \"I became lethargic.\"    Hydroxyzine Itching     Pt will only state \"I feel itchy when I take it    Ziprasidone        PHYSICAL EXAM  VITAL SIGNS: /71   Pulse 69   Temp 36.3 °C (97.3 °F) (Temporal)   Resp 18   Ht 1.981 m (6' 6\")   Wt 108 kg (238 lb)   SpO2 94%   BMI 27.50 kg/m²    Constitutional: Well developed, well nourished. No acute distress.  HEENT: Normocephalic, atraumatic. Posterior pharynx clear and moist.  Eyes:  EOMI. Normal sclera.  Neck: Supple, Full range of motion, nontender.  Chest/Pulmonary: clear to ausculation. Symmetrical expansion.   Cardio: Regular rate and " rhythm with no murmur.   Abdomen: Soft, nontender. No peritoneal signs. No guarding. No palpable masses.  Back: No CVA tenderness, nontender midline, no step offs.  Musculoskeletal: No deformity, no edema, neurovascular intact.   Neuro: Clear speech, appropriate, cooperative, cranial nerves II-XII grossly intact.  Psych: Normal mood and affect      EKG/LABS  Results for orders placed or performed during the hospital encounter of 08/05/24   CBC WITH DIFFERENTIAL   Result Value Ref Range    WBC 8.5 4.8 - 10.8 K/uL    RBC 3.85 (L) 4.70 - 6.10 M/uL    Hemoglobin 12.0 (L) 14.0 - 18.0 g/dL    Hematocrit 34.8 (L) 42.0 - 52.0 %    MCV 90.4 81.4 - 97.8 fL    MCH 31.2 27.0 - 33.0 pg    MCHC 34.5 32.3 - 36.5 g/dL    RDW 44.1 35.9 - 50.0 fL    Platelet Count 227 164 - 446 K/uL    MPV 10.6 9.0 - 12.9 fL    Neutrophils-Polys 55.70 44.00 - 72.00 %    Lymphocytes 32.30 22.00 - 41.00 %    Monocytes 7.60 0.00 - 13.40 %    Eosinophils 1.50 0.00 - 6.90 %    Basophils 1.80 0.00 - 1.80 %    Immature Granulocytes 1.10 (H) 0.00 - 0.90 %    Nucleated RBC 0.00 0.00 - 0.20 /100 WBC    Neutrophils (Absolute) 4.75 1.82 - 7.42 K/uL    Lymphs (Absolute) 2.75 1.00 - 4.80 K/uL    Monos (Absolute) 0.65 0.00 - 0.85 K/uL    Eos (Absolute) 0.13 0.00 - 0.51 K/uL    Baso (Absolute) 0.15 (H) 0.00 - 0.12 K/uL    Immature Granulocytes (abs) 0.09 0.00 - 0.11 K/uL    NRBC (Absolute) 0.00 K/uL   COMP METABOLIC PANEL   Result Value Ref Range    Sodium 132 (L) 135 - 145 mmol/L    Potassium 4.7 3.6 - 5.5 mmol/L    Chloride 98 96 - 112 mmol/L    Co2 20 20 - 33 mmol/L    Anion Gap 14.0 7.0 - 16.0    Glucose 351 (H) 65 - 99 mg/dL    Bun 26 (H) 8 - 22 mg/dL    Creatinine 0.77 0.50 - 1.40 mg/dL    Calcium 9.0 8.5 - 10.5 mg/dL    Correct Calcium 8.8 8.5 - 10.5 mg/dL    AST(SGOT) 11 (L) 12 - 45 U/L    ALT(SGPT) 13 2 - 50 U/L    Alkaline Phosphatase 82 30 - 99 U/L    Total Bilirubin 0.3 0.1 - 1.5 mg/dL    Albumin 4.3 3.2 - 4.9 g/dL    Total Protein 6.5 6.0 - 8.2 g/dL     Globulin 2.2 1.9 - 3.5 g/dL    A-G Ratio 2.0 g/dL   PROTHROMBIN TIME   Result Value Ref Range    PT 12.8 12.0 - 14.6 sec    INR 0.95 0.87 - 1.13   APTT   Result Value Ref Range    APTT 27.6 24.7 - 36.0 sec   COD (ADULT)   Result Value Ref Range    ABO Grouping Only A     Rh Grouping Only POS     Antibody Screen-Cod NEG    TROPONIN   Result Value Ref Range    Troponin T 24 (H) 6 - 19 ng/L   ESTIMATED GFR   Result Value Ref Range    GFR (CKD-EPI) 115 >60 mL/min/1.73 m 2   EKG (NOW)   Result Value Ref Range    Report       [facility] Emergency Dept[facility]    Test Date:  2024  Pt Name:    HOLLY KIM                 Department: ER  MRN:        [id number]                      Room:       [facility]  Gender:     Male                         Technician: [id number]  :        1980                   Requested By:BERNICE JAIN  Order #:    [id number]                    Reading MD:    Measurements  Intervals                                Axis  Rate:       63                           P:          50  TX:         179                          QRS:        35  QRSD:       119                          T:          46  QT:         391  QTc:        401    Interpretive Statements  Sinus rhythm  Nonspecific intraventricular conduction delay  Low voltage, extremity leads  Compared to ECG 2024 16:16:32  No significant changes     POCT glucose device results   Result Value Ref Range    POC Glucose, Blood 348 (H) 65 - 99 mg/dL       EKG; normal sinus rhythm at a rate of 63.  No ST elevation or ST depression.  QTc is 4 1.  Compared to EKG from 2024.    RADIOLOGY/PROCEDURES   I have independently interpreted the diagnostic imaging associated with this visit and am waiting the final reading from the radiologist.   My preliminary interpretation is as follows: see below     Radiologist interpretation:  DX-CHEST-PORTABLE (1 VIEW)   Final Result      1.  Hypoinflation with elevation of RIGHT hemidiaphragm.   2.  No  "pneumonia or pulmonary edema.      CT-CTA NECK WITH & W/O-POST PROCESSING   Final Result      1.  CT angiogram of the neck within normal limits.   2.  Redemonstrated heterogeneous midline anterior neck mass, unchanged from previous exam.      CT-CTA HEAD WITH & W/O-POST PROCESS   Final Result      CT angiogram of the [place] within normal limits.      CT-CEREBRAL PERFUSION ANALYSIS   Final Result      1. Cerebral blood flow less than 30% possibly representing completed infarct = 1 mL. Based on distribution of this finding, this is likely to represent artifact.      2. T Max more than 6 seconds possibly representing combination of completed infarct and ischemia = 7 mL. Based on the distribution of this finding, this is likely to represent artifact.      3. Mismatched volume possibly representing ischemic brain/penumbra= 6 mL      4.  Please note that this cerebral perfusion study and report is Quantitative and targets supratentorial (cerebral) perfusion for evaluation of large vessel territory acute ischemia/infarction. For example, lacunar infarcts, and brainstem/posterior fossa    ischemia/infarction are not evaluated on this study.  Data acquisition is subject to artifacts which can yield non-anatomically plausible perfusion maps which may be due to motion, bolus timing, signal to noise ratio, or other technical factors.    Perfusion map abnormalities which show non-anatomic distributions are likely artifact.   This study is not \"stand-alone\" and should only be utilized for diagnosis, management/treatment in correlation with CT, CTA, and/or MRI and clinical factors.         CT-HEAD W/O   Final Result      1.  No acute intracranial abnormality.   2.  Diffuse low density again seen throughout the white matter, similar to prior exam, favoring demyelinating process.                   COURSE & MEDICAL DECISION MAKING    ASSESSMENT, COURSE AND PLAN  Care Narrative: This is a 44-year-old male here for evaluation " of strokelike symptoms.  Patient is well-known to the hospital service and neurology.  Patient often presents with deficits, but at this time has no acute finding on CT scan.  He will be admitted for observation and possible psych to see for possible conversion.      1:37 PM  Dr. Garcia states pt is well known to the service. At this time he would do a ct head without, and no other imaging.  No need for future work up regarding ct and MRI.     2:20 pm  Pt had work up secondary to being a 'stroke alert.' But has no acute findings.     3:43 PM  Pt still will not move his right upper or lower ext.  No need for further work up from earlier discussion with Dr. Garcia, pt may benefit psych consult for conversion disorder etc.     DISPOSITION AND DISCUSSIONS  I have discussed management of the patient with the following physicians and MANNY's:  hospitalist cdu        FINAL DIAGNOSIS  Stroke like symptoms      Electronically signed by: Candido Villar D.O., 8/5/2024 1:37 PM

## 2024-08-05 NOTE — PROGRESS NOTES
Stroke brief note    Stroke alert activated for R side deficits.  Patient well-known to Stroke service despite never having a stroke.  Please see previous documentation.  Extensive neurologic history- frequent presentations for R side deficits in setting of hyperglycemia.  Has functional components on exam, and symptoms have always resolved.  He's had now at least ~ 20 or so CTA head and neck and MRI brain studies in the past 8 years at multiple institutions.  He has never had a finding of acute stroke or vascular lesion on any of these imaging studies.    Highest suspicion remains for conversion disorder. May admit for therapies and blood sugar control.  I do not recommend any repeat stroke diagnostics or initiation of secondary stroke prevention regimen.      Long Garcia MD  Stroke Neurology

## 2024-08-05 NOTE — H&P
Hospital Medicine History & Physical Note    Date of Service  8/5/2024    Primary Care Physician  DANY Ellington.    Consultants  neurology    Specialist Names: Dr Garcia, signed off    Code Status  Full Code    Chief Complaint  Chief Complaint   Patient presents with    Unilateral Weakness     Pt reports right sided weakness, began around noon today. Hx stroke with R sided deficits. States he normally is weak at baseline but began much worse today.     High Blood Sugar     Pt reports he has not had his insulin or metformin for 2 days.  per EMS       History of Presenting Illness  Norm Prabhakar is a 44 y.o. male with pmh of uncontrolled diabetes type 2, hypothyroidism who presented to the ED on 8/5/2024 with complaints of right-sided weakness. Patient reports a history of right-sided weakness due to previous stroke, but says that he has been unable to move his right arm today. He says he was cleaning his apartment today when his symptoms occurred. He denies any trauma to this arm, denies any changes to other extremities, denies changes in speech, swallowing. He denies dizziness, lightheadedness, shortness of breath, chest pain or palpitations. Denies recent cold or flulike symptoms. He says he has not taken any of his medications, including his diabetic medication the last couple days, as he was visiting with a friend and drinking some wine and did not want to mix alcohol with his medications.    ERP discussed case with neurologist, Dr. Garcia, who recommends no further stroke workup, as this patient has had multiple workup in the past and is well known to neurology service and has never had findings of acute stroke. Patient apparently has had at least 20 or so CTA's of head and neck and MRI brain in the past eight years at multiple institutions, per Dr. Garcia.   A nurse evaluation, patient was using his phone in his right hand, and using his right arm. However, he put this down on her entry to the room,  and said he was unable to move his arm.     CBC significant for hemoglobin 12, but otherwise unremarkable. CMP shows sodium 132, glucose 351, BUN 26, otherwise unremarkable. A1c 10.3, troponin 24. EKG with no acute ST-T changes. CT head, CTA head and neck, show no acute findings under similar to prior. Chest x-ray with no acute findings.EKG with no acute STD changes.    I discussed the plan of care with patient and ERP .    Review of Systems  Review of Systems   Constitutional:  Negative for chills, fever, malaise/fatigue and weight loss.   HENT:  Negative for congestion and sore throat.    Eyes:  Negative for blurred vision and double vision.   Respiratory:  Negative for cough, shortness of breath, wheezing and stridor.    Cardiovascular:  Negative for chest pain, palpitations and leg swelling.   Gastrointestinal:  Negative for abdominal pain, constipation, diarrhea, heartburn, nausea and vomiting.   Genitourinary:  Negative for dysuria, flank pain and urgency.   Musculoskeletal:  Negative for joint pain and myalgias.   Skin:  Negative for itching and rash.   Neurological:  Positive for weakness (right sided). Negative for dizziness and headaches.   Psychiatric/Behavioral:  Negative for depression. The patient is not nervous/anxious.        Past Medical History   has a past medical history of Anxiety, ASTHMA, Bipolar 1 disorder ([facility]), Depression, Diabetes (HCC), Fall, Glaucoma, Glaucoma (1982), Hypothyroidism, Indigestion, Mental disorder, Murmur, Pneumonia, Psychiatric problem (2002), S/P thyroidectomy, Seizure ([facility]) (2010), Seizure disorder ([facility]), and Unspecified disorder of thyroid.    Surgical History   has a past surgical history that includes eye surgery; thyroid lobectomy; and other.     Family History  family history includes Heart Disease in his mother; Hypertension in his mother; Lung Disease in his mother; Stroke in his maternal grandmother.   Family history reviewed with patient. There is no family  "history that is pertinent to the chief complaint.     Social History   reports that he quit smoking about 4 years ago. His smoking use included cigarettes and cigars. He has never used smokeless tobacco. He reports that he does not currently use alcohol. He reports that he does not currently use drugs after having used the following drugs: Inhaled.    Allergies  Allergies   Allergen Reactions    Abilify      \"Feeling tired, like I don't even know whats going on around me\"    Fish      Pt reports salmon causes him to be sick to his stomach  Not listed on MAR noted 2/3/2021      Geodon [Ziprasidone Hcl] Anaphylaxis     Anaphylaxis per patient    Aripiprazole      \"I became lethargic.\"    Hydroxyzine Itching     Pt will only state \"I feel itchy when I take it    Ziprasidone        Medications  Prior to Admission Medications   Prescriptions Last Dose Informant Patient Reported? Taking?   Fenofibrate 200 MG Cap  Patient Yes No   Sig: Take 200 mg by mouth every day.   Icosapent Ethyl (VASCEPA) 1 g Cap  Patient Yes No   Sig: Take 2 g by mouth 2 times a day.   aspirin 81 MG EC tablet   Yes No   Sig: Take 81 mg by mouth every day.   divalproex (DEPAKOTE) 500 MG Tablet Delayed Response  Patient Yes No   Sig: Take 500-1,500 mg by mouth 2 times a day. 500 mg in AM and 1500 mg at night   latanoprost (XALATAN) 0.005 % Solution  Patient Yes No   Sig: Administer 1 Drop into both eyes every evening.   levothyroxine (SYNTHROID) 200 MCG Tab  Patient Yes No   Sig: Take 200 mcg by mouth every morning on an empty stomach. Total of 225 mcg once a day   levothyroxine (SYNTHROID) 25 MCG Tab  Patient Yes No   Sig: Take 25 mcg by mouth every morning on an empty stomach. Total of 225 mcg once a day   lurasidone (LATUDA) 80 MG Tab  Patient Yes No   Sig: Take 80 mg by mouth with dinner.   polyethylene glycol/lytes (MIRALAX) Pack   No No   Sig: Mix and drink 1 Packet by mouth 2 times a day as needed (For constipation, hold for loose stools). "   prazosin (MINIPRESS) 5 MG Cap  Patient Yes No   Sig: Take 5 mg by mouth every evening.   sertraline (ZOLOFT) 100 MG Tab  Patient Yes No   Sig: Take 150 mg by mouth every day.   traZODone (DESYREL) 50 MG Tab  Patient Yes No   Sig: Take 50 mg by mouth every evening. Indications: Trouble Sleeping   vitamin D3 (CHOLECALCIFEROL) 1000 Unit (25 mcg) Tab   Yes No   Sig: Take 1,000 Units by mouth at bedtime.      Facility-Administered Medications: None       Physical Exam  Temp:  [36.3 °C (97.3 °F)-36.6 °C (97.9 °F)] 36.6 °C (97.9 °F)  Pulse:  [57-69] 61  Resp:  [16-18] 18  BP: (105-118)/(68-79) 115/75  SpO2:  [93 %-97 %] 94 %  Blood Pressure: 118/68   Temperature: 36.3 °C (97.3 °F)   Pulse: 64   Respiration: 18   Pulse Oximetry: 96 %       Physical Exam  Vitals and nursing note reviewed.   Constitutional:       General: He is not in acute distress.     Appearance: Normal appearance. He is not ill-appearing.   HENT:      Head: Normocephalic and atraumatic.      Nose: Nose normal. No congestion or rhinorrhea.      Mouth/Throat:      Mouth: Mucous membranes are dry.      Pharynx: Oropharynx is clear.   Eyes:      Extraocular Movements: Extraocular movements intact.      Pupils: Pupils are equal, round, and reactive to light.   Cardiovascular:      Rate and Rhythm: Normal rate and regular rhythm.      Pulses: Normal pulses.      Heart sounds: Normal heart sounds. No murmur heard.  Pulmonary:      Effort: Pulmonary effort is normal.      Breath sounds: Normal breath sounds. No rales.   Chest:      Chest wall: No tenderness.   Abdominal:      General: Bowel sounds are normal. There is no distension.      Palpations: Abdomen is soft.      Tenderness: There is no abdominal tenderness.   Musculoskeletal:         General: Normal range of motion.      Cervical back: Normal range of motion.      Right lower leg: No edema.      Left lower leg: No edema.   Skin:     General: Skin is warm and dry.      Capillary Refill: Capillary refill  "takes less than 2 seconds.      Findings: No lesion or rash.   Neurological:      Mental Status: He is alert and oriented to person, place, and time.      Cranial Nerves: No cranial nerve deficit.      Sensory: No sensory deficit.      Motor: Weakness (right arm weakness) present.         Laboratory:  Recent Labs     08/05/24  1333   WBC 8.5   RBC 3.85*   HEMOGLOBIN 12.0*   HEMATOCRIT 34.8*   MCV 90.4   MCH 31.2   MCHC 34.5   RDW 44.1   PLATELETCT 227   MPV 10.6     Recent Labs     08/05/24  1333   SODIUM 132*   POTASSIUM 4.7   CHLORIDE 98   CO2 20   GLUCOSE 351*   BUN 26*   CREATININE 0.77   CALCIUM 9.0     Recent Labs     08/05/24  1333   ALTSGPT 13   ASTSGOT 11*   ALKPHOSPHAT 82   TBILIRUBIN 0.3   GLUCOSE 351*     Recent Labs     08/05/24  1333   APTT 27.6   INR 0.95     No results for input(s): \"NTPROBNP\" in the last 72 hours.      Recent Labs     08/05/24  1333   TROPONINT 24*       Imaging:  DX-CHEST-PORTABLE (1 VIEW)   Final Result      1.  Hypoinflation with elevation of RIGHT hemidiaphragm.   2.  No pneumonia or pulmonary edema.      CT-CTA NECK WITH & W/O-POST PROCESSING   Final Result      1.  CT angiogram of the neck within normal limits.   2.  Redemonstrated heterogeneous midline anterior neck mass, unchanged from previous exam.      CT-CTA HEAD WITH & W/O-POST PROCESS   Final Result      CT angiogram of the [place] within normal limits.      CT-CEREBRAL PERFUSION ANALYSIS   Final Result      1. Cerebral blood flow less than 30% possibly representing completed infarct = 1 mL. Based on distribution of this finding, this is likely to represent artifact.      2. T Max more than 6 seconds possibly representing combination of completed infarct and ischemia = 7 mL. Based on the distribution of this finding, this is likely to represent artifact.      3. Mismatched volume possibly representing ischemic brain/penumbra= 6 mL      4.  Please note that this cerebral perfusion study and report is " "Quantitative and targets supratentorial (cerebral) perfusion for evaluation of large vessel territory acute ischemia/infarction. For example, lacunar infarcts, and brainstem/posterior fossa    ischemia/infarction are not evaluated on this study.  Data acquisition is subject to artifacts which can yield non-anatomically plausible perfusion maps which may be due to motion, bolus timing, signal to noise ratio, or other technical factors.    Perfusion map abnormalities which show non-anatomic distributions are likely artifact.   This study is not \"stand-alone\" and should only be utilized for diagnosis, management/treatment in correlation with CT, CTA, and/or MRI and clinical factors.         CT-HEAD W/O   Final Result      1.  No acute intracranial abnormality.   2.  Diffuse low density again seen throughout the white matter, similar to prior exam, favoring demyelinating process.                   EKG 8/5/24  Measurements   Intervals                               Axis   Rate:       63                           P:          50   SC:         179                         QRS:      35   QRSD:    119                          T:           46   QT:          391   QTc:        401   Interpretive Statements   Sinus rhythm   Nonspecific intraventricular conduction delay   Low voltage, extremity leads         ASSESSMENT/PLAN:  * Right sided weakness- (present on admission)  Assessment & Plan  Per Dr. Delong, neurology, no further stroke workup necessary. Recommends psychiatric evaluation due to likely conversion disorder  -psychiatric consult placed  -discontinue telemonitor  - Will continue q 4 hr eval  - PT/OT/ST    Uncontrolled type 2 diabetes mellitus with hyperglycemia (HCC)- (present on admission)  Assessment & Plan  A1c>10.  - NS bolus followed by continuous at 100 ml/hr  - accucheck ac/hs with low dose ssi coverage  - Continue lantus 40 units HS  - Continue metformin  - DM diet    Hypothyroidism- (present on " admission)  Assessment & Plan  - continue levothyroxine    Other hyperlipidemia- (present on admission)  Assessment & Plan  - continue statin, fenofibrate    Anxiety and depression- (present on admission)  Assessment & Plan  - resume zoloft and trazodone    Asthma- (present on admission)  Assessment & Plan  - Albuterol PRN      Justification for Admission Status  I anticipate this patient is appropriate for observation status at this time because patient requires evaluation of weakness, as well as psychiatry consult    Patient will need a Telemetry bed on MEDICAL service. The need is secondary to complaints of weakness and initial concern for stroke-like symptoms.    VTE prophylaxis: SCDs/TEDs and heparin ppx

## 2024-08-05 NOTE — ED TRIAGE NOTES
Chief Complaint   Patient presents with    Unilateral Weakness     Pt reports right sided weakness, began around noon today. Hx stroke with R sided deficits. States he normally is weak at baseline but began much worse today.     High Blood Sugar     Pt reports he has not had his insulin or metformin for 2 days.  per EMS     Pt BIB [facility] for above. Given 500ml IVF by EMS.    Pt able to move R arm and R leg slightly. Reports unable to feel anything on R side.     ERP called to bedside.

## 2024-08-06 ENCOUNTER — APPOINTMENT (OUTPATIENT)
Dept: RADIOLOGY | Facility: MEDICAL CENTER | Age: 44
End: 2024-08-06
Attending: HOSPITALIST
Payer: MEDICAID

## 2024-08-06 LAB
ANION GAP SERPL CALC-SCNC: 12 MMOL/L (ref 7–16)
BUN SERPL-MCNC: 23 MG/DL (ref 8–22)
CALCIUM SERPL-MCNC: 8.2 MG/DL (ref 8.5–10.5)
CHLORIDE SERPL-SCNC: 101 MMOL/L (ref 96–112)
CHOLEST SERPL-MCNC: 296 MG/DL (ref 100–199)
CO2 SERPL-SCNC: 22 MMOL/L (ref 20–33)
CREAT SERPL-MCNC: 0.68 MG/DL (ref 0.5–1.4)
GFR SERPLBLD CREATININE-BSD FMLA CKD-EPI: 119 ML/MIN/1.73 M 2
GLUCOSE BLD STRIP.AUTO-MCNC: 181 MG/DL (ref 65–99)
GLUCOSE BLD STRIP.AUTO-MCNC: 189 MG/DL (ref 65–99)
GLUCOSE BLD STRIP.AUTO-MCNC: 212 MG/DL (ref 65–99)
GLUCOSE SERPL-MCNC: 192 MG/DL (ref 65–99)
HDLC SERPL-MCNC: 31 MG/DL
LDLC SERPL CALC-MCNC: ABNORMAL MG/DL
POTASSIUM SERPL-SCNC: 3.5 MMOL/L (ref 3.6–5.5)
SODIUM SERPL-SCNC: 135 MMOL/L (ref 135–145)
TRIGL SERPL-MCNC: 881 MG/DL (ref 0–149)

## 2024-08-06 PROCEDURE — G0378 HOSPITAL OBSERVATION PER HR: HCPCS

## 2024-08-06 PROCEDURE — A9270 NON-COVERED ITEM OR SERVICE: HCPCS | Mod: UD | Performed by: NURSE PRACTITIONER

## 2024-08-06 PROCEDURE — 700102 HCHG RX REV CODE 250 W/ 637 OVERRIDE(OP): Mod: UD | Performed by: HOSPITALIST

## 2024-08-06 PROCEDURE — 80048 BASIC METABOLIC PNL TOTAL CA: CPT

## 2024-08-06 PROCEDURE — A9270 NON-COVERED ITEM OR SERVICE: HCPCS | Mod: UD | Performed by: HOSPITALIST

## 2024-08-06 PROCEDURE — 97162 PT EVAL MOD COMPLEX 30 MIN: CPT

## 2024-08-06 PROCEDURE — 99232 SBSQ HOSP IP/OBS MODERATE 35: CPT | Performed by: HOSPITALIST

## 2024-08-06 PROCEDURE — 70551 MRI BRAIN STEM W/O DYE: CPT

## 2024-08-06 PROCEDURE — 700111 HCHG RX REV CODE 636 W/ 250 OVERRIDE (IP): Mod: UD | Performed by: NURSE PRACTITIONER

## 2024-08-06 PROCEDURE — 700102 HCHG RX REV CODE 250 W/ 637 OVERRIDE(OP): Mod: UD | Performed by: NURSE PRACTITIONER

## 2024-08-06 PROCEDURE — 92610 EVALUATE SWALLOWING FUNCTION: CPT

## 2024-08-06 PROCEDURE — 36415 COLL VENOUS BLD VENIPUNCTURE: CPT

## 2024-08-06 PROCEDURE — 96372 THER/PROPH/DIAG INJ SC/IM: CPT

## 2024-08-06 PROCEDURE — 82962 GLUCOSE BLOOD TEST: CPT

## 2024-08-06 PROCEDURE — 80061 LIPID PANEL: CPT

## 2024-08-06 RX ORDER — BUSPIRONE HYDROCHLORIDE 10 MG/1
10 TABLET ORAL 2 TIMES DAILY
Status: DISCONTINUED | OUTPATIENT
Start: 2024-08-06 | End: 2024-08-07

## 2024-08-06 RX ORDER — DEXTROSE MONOHYDRATE 25 G/50ML
25 INJECTION, SOLUTION INTRAVENOUS
Status: DISCONTINUED | OUTPATIENT
Start: 2024-08-06 | End: 2024-08-10 | Stop reason: HOSPADM

## 2024-08-06 RX ADMIN — INSULIN HUMAN 1 UNITS: 100 INJECTION, SOLUTION PARENTERAL at 20:13

## 2024-08-06 RX ADMIN — HEPARIN SODIUM 5000 UNITS: 5000 INJECTION, SOLUTION INTRAVENOUS; SUBCUTANEOUS at 20:14

## 2024-08-06 RX ADMIN — INSULIN HUMAN 2 UNITS: 100 INJECTION, SOLUTION PARENTERAL at 18:37

## 2024-08-06 RX ADMIN — LURASIDONE HYDROCHLORIDE 80 MG: 40 TABLET, FILM COATED ORAL at 18:38

## 2024-08-06 RX ADMIN — PRAZOSIN HYDROCHLORIDE 5 MG: 5 CAPSULE ORAL at 20:06

## 2024-08-06 RX ADMIN — HEPARIN SODIUM 5000 UNITS: 5000 INJECTION, SOLUTION INTRAVENOUS; SUBCUTANEOUS at 05:04

## 2024-08-06 RX ADMIN — Medication 1000 UNITS: at 20:06

## 2024-08-06 RX ADMIN — MONTELUKAST 10 MG: 10 TABLET, FILM COATED ORAL at 20:05

## 2024-08-06 RX ADMIN — HEPARIN SODIUM 5000 UNITS: 5000 INJECTION, SOLUTION INTRAVENOUS; SUBCUTANEOUS at 14:35

## 2024-08-06 RX ADMIN — ATORVASTATIN CALCIUM 40 MG: 40 TABLET, FILM COATED ORAL at 20:06

## 2024-08-06 RX ADMIN — BUSPIRONE HYDROCHLORIDE 10 MG: 10 TABLET ORAL at 18:38

## 2024-08-06 RX ADMIN — INSULIN GLARGINE-YFGN 40 UNITS: 100 INJECTION, SOLUTION SUBCUTANEOUS at 18:38

## 2024-08-06 ASSESSMENT — GAIT ASSESSMENTS
DISTANCE (FEET): 5
ASSISTIVE DEVICE: FRONT WHEEL WALKER
GAIT LEVEL OF ASSIST: MINIMAL ASSIST

## 2024-08-06 ASSESSMENT — PAIN DESCRIPTION - PAIN TYPE: TYPE: ACUTE PAIN

## 2024-08-06 ASSESSMENT — COGNITIVE AND FUNCTIONAL STATUS - GENERAL
CLIMB 3 TO 5 STEPS WITH RAILING: TOTAL
STANDING UP FROM CHAIR USING ARMS: A LITTLE
MOVING FROM LYING ON BACK TO SITTING ON SIDE OF FLAT BED: A LITTLE
WALKING IN HOSPITAL ROOM: A LOT
MOBILITY SCORE: 15
MOVING TO AND FROM BED TO CHAIR: A LITTLE
TURNING FROM BACK TO SIDE WHILE IN FLAT BAD: A LITTLE
SUGGESTED CMS G CODE MODIFIER MOBILITY: CK

## 2024-08-06 ASSESSMENT — ENCOUNTER SYMPTOMS
FEVER: 0
PSYCHIATRIC NEGATIVE: 1
CARDIOVASCULAR NEGATIVE: 1
FOCAL WEAKNESS: 1
MUSCULOSKELETAL NEGATIVE: 1
GASTROINTESTINAL NEGATIVE: 1

## 2024-08-06 NOTE — THERAPY
Occupational Therapy Contact Note    Patient Name: Norm Prabhakar  Age:  44 y.o., Sex:  male  Medical Record #: [id number]  Today's Date: 8/6/2024    Pt transferred from CDU to S6 medical floor. Will follow-up at later time as able for OT eval.     NONI Meier, OTR/L

## 2024-08-06 NOTE — CARE PLAN
Problem: Optimal Care of the Stroke Patient  Goal: Optimal acute care for the stroke patient  Outcome: Progressing     Problem: Knowledge Deficit - Stroke Education  Goal: Patient's knowledge of stroke and risk factors will improve  Outcome: Progressing     Problem: Neuro Status  Goal: Neuro status will remain stable or improve  Outcome: Progressing   The patient is Stable - Low risk of patient condition declining or worsening    Shift Goals  Clinical Goals: neuro obs  Patient Goals: therapies  Family Goals: NA

## 2024-08-06 NOTE — PROGRESS NOTES
[address]  EMERGENCY DEPARTMENT HISTORY AND PHYSICAL EXAM       Date: 6/28/2017   Patient Name: Edie Maurice   YOB: 1954  Medical Record Number: [id number]    History of Presenting Illness     Chief Complaint   Patient presents with    Skin Problem        History Provided By:  patient    Additional History: 5:53 PM   Edie Maurice is a 63 y.o. male with hx of Down's syndrome and Alzheimer's dementia, who presents to the emergency department with his caregiver for a gradually worsening rash to bilateral, lateral thighs starting earlier today. The rash started on the right lateral thigh and has spread progressively. Associated sxs include fever and intermittent crying for 2 days. Per caregiver, the patient did not urinate last night (resolved with fluids this morning) and had a large bowel movement today. Pt is wheelchair bound. Denies chills, chest pain, SOB, and any other sxs or complaints. Hx limited to due cognitive baseline. Primary Care Provider: Bishop Santosh MD   Specialist:    Past History     Past Medical History:   Past Medical History:   Diagnosis Date    Alzheimer's dementia     Down's syndrome     Seizure [facility])         Past Surgical History:   No past surgical history on file. Family History:   No family history on file. Social History:   Social History   Substance Use Topics    Smoking status: Never Smoker    Smokeless tobacco: Not on file    Alcohol use Not on file        Allergies:   No Known Allergies     Review of Systems   Review of Systems   Unable to perform ROS: Dementia   Constitutional: Positive for fever. Negative for chills. Intermittent crying for 2 days    Respiratory: Negative for shortness of breath. Cardiovascular: Negative for chest pain. Skin: Positive for rash (bilateral, lateral thighs).        Physical Exam  Vitals:    06/28/17 1739 06/28/17 1801   BP: 132/73    Pulse: 91    Resp: 20    Temp: 100 °F (37.8 [facility] Medicine Daily Progress Note    Date of Service  8/6/2024    Chief Complaint  Norm Prabhakar is a 44 y.o. male admitted 8/5/2024 with right side weakness    Hospital Course  Norm Prabhakar is a 44 y.o. male with pmh of uncontrolled diabetes type 2, hypothyroidism who presented to the ED on 8/5/2024 with complaints of right-sided weakness. Patient reports a history of right-sided weakness due to previous stroke, but says that he has been unable to move his right arm today. He says he was cleaning his apartment today when his symptoms occurred. He denies any trauma to this arm, denies any changes to other extremities, denies changes in speech, swallowing. He denies dizziness, lightheadedness, shortness of breath, chest pain or palpitations. Denies recent cold or flulike symptoms. He says he has not taken any of his medications, including his diabetic medication the last couple days, as he was visiting with a friend and drinking some wine and did not want to mix alcohol with his medications.     ERP discussed case with neurologist, Dr. Garcia, who recommends no further stroke workup, as this patient has had multiple workup in the past and is well known to neurology service and has never had findings of acute stroke. Patient apparently has had at least 20 or so CTA's of head and neck and MRI brain in the past eight years at multiple institutions, per Dr. Garcia.   A nurse evaluation, patient was using his phone in his right hand, and using his right arm. However, he put this down on her entry to the room, and said he was unable to move his arm.      CBC significant for hemoglobin 12, but otherwise unremarkable. CMP shows sodium 132, glucose 351, BUN 26, otherwise unremarkable. A1c 10.3, troponin 24. EKG with no acute ST-T changes. CT head, CTA head and neck, show no acute findings under similar to prior. Chest x-ray with no acute findings.EKG with no acute STD changes.    Interval Problem Update  The patient states  that is unable to move his right side of his body.    This complaint of right-sided weakness is inconsistent when he complains about it as well as on exam.    MRI of the brain unremarkable for acute CVA.    Psychiatry consulted for possible conversion disorder.  Neurology has signed off.    I have discussed this patient's plan of care and discharge plan at IDT rounds today with Case Management, Nursing, Nursing leadership, and other members of the IDT team.    Consultants/Specialty  psychiatry    Code Status  Full Code    Disposition  <MEDICALLYCLEARED>  I have placed the appropriate orders for post-discharge needs.    Review of Systems  Review of Systems   Constitutional:  Negative for fever and malaise/fatigue.   Cardiovascular: Negative.    Gastrointestinal: Negative.    Genitourinary: Negative.    Musculoskeletal: Negative.    Neurological:  Positive for focal weakness.   Psychiatric/Behavioral: Negative.          Physical Exam  Temp:  [36.3 °C (97.3 °F)-37.1 °C (98.7 °F)] 36.4 °C (97.6 °F)  Pulse:  [52-69] 52  Resp:  [16-18] 16  BP: ()/(57-79) 105/66  SpO2:  [93 %-97 %] 95 %    Physical Exam  Constitutional:       General: He is not in acute distress.     Appearance: He is not ill-appearing or toxic-appearing.   HENT:      Head: Normocephalic.      Mouth/Throat:      Mouth: Mucous membranes are moist.   Eyes:      General:         Left eye: No discharge.   Cardiovascular:      Rate and Rhythm: Normal rate and regular rhythm.      Heart sounds: No murmur heard.     No gallop.   Pulmonary:      Effort: No respiratory distress.      Breath sounds: No stridor. No wheezing.   Abdominal:      General: There is no distension.   Musculoskeletal:         General: No swelling.      Cervical back: Normal range of motion and neck supple.      Right lower leg: No edema.      Left lower leg: No edema.   Neurological:      Mental Status: He is oriented to person, place, and time.      Motor: Weakness (right side)  °C)    SpO2: 100%    Weight:  81.2 kg (179 lb)   Height: 5' 2\" (1.575 m)        Physical Exam   Constitutional: He is oriented to person, place, and time. He appears well-developed and well-nourished. HENT:   Head: Normocephalic and atraumatic. Right Ear: External ear normal.   Left Ear: External ear normal.   Nose: Nose normal.   Mouth/Throat: Oropharynx is clear and moist.   Eyes: Conjunctivae and EOM are normal. Pupils are equal, round, and reactive to light. Neck: Normal range of motion. Neck supple. No JVD present. No tracheal deviation present. No thyromegaly present. Cardiovascular: Normal rate, regular rhythm, normal heart sounds and intact distal pulses. Pulmonary/Chest: Effort normal and breath sounds normal.   Abdominal: Soft. He exhibits no distension and no mass. Bowel sounds are increased. Tenderness: Unclear if he is tender due to cognitive baseline. No HSM. Loose brown stool in diaper. Musculoskeletal: Normal range of motion. He exhibits no edema or tenderness. Lymphadenopathy:     He has no cervical adenopathy. Neurological: He is alert and oriented to person, place, and time. He has normal reflexes. No cranial nerve deficit. He exhibits normal muscle tone. Coordination normal.   No focal weakness   Skin: Skin is warm and dry. Rash (here is a slight rash-like appearance to upper left thigh and bilateral buttock.) noted. Cellulitis to right mid upper thigh to buttocks some to the left upper thigh. No ulceration. It is warm and red. There is blanching erythema to bilateral buttocks. Psychiatric: He has a normal mood and affect. His behavior is normal. Thought content normal.   Nursing note and vitals reviewed.       Diagnostic Study Results     Labs -      Recent Results (from the past 12 hour(s))   CBC WITH AUTOMATED DIFF    Collection Time: 06/28/17  6:29 PM   Result Value Ref Range    WBC 9.0 4.6 - 13.2 K/uL    RBC 3.94 (L) 4.70 - 5.50 M/uL    HGB 13.5 13.0 - 16.0 g/dL HCT 39.1 36.0 - 48.0 %    MCV 99.2 (H) 74.0 - 97.0 FL    MCH 34.3 (H) 24.0 - 34.0 PG    MCHC 34.5 31.0 - 37.0 g/dL    RDW 13.0 11.6 - 14.5 %    PLATELET 197 857 - 428 K/uL    MPV 10.5 9.2 - 11.8 FL    NEUTROPHILS 74 (H) 40 - 73 %    LYMPHOCYTES 15 (L) 21 - 52 %    MONOCYTES 10 3 - 10 %    EOSINOPHILS 1 0 - 5 %    BASOPHILS 0 0 - 2 %    ABS. NEUTROPHILS 6.6 1.8 - 8.0 K/UL    ABS. LYMPHOCYTES 1.4 0.9 - 3.6 K/UL    ABS. MONOCYTES 0.9 0.05 - 1.2 K/UL    ABS. EOSINOPHILS 0.1 0.0 - 0.4 K/UL    ABS. BASOPHILS 0.0 0.0 - 0.06 K/UL    DF AUTOMATED     METABOLIC PANEL, COMPREHENSIVE    Collection Time: 06/28/17  6:29 PM   Result Value Ref Range    Sodium 137 136 - 145 mmol/L    Potassium 3.9 3.5 - 5.5 mmol/L    Chloride 102 100 - 108 mmol/L    CO2 23 21 - 32 mmol/L    Anion gap 12 3.0 - 18 mmol/L    Glucose 167 (H) 74 - 99 mg/dL    BUN 16 7.0 - 18 MG/DL    Creatinine 1.17 0.6 - 1.3 MG/DL    BUN/Creatinine ratio 14 12 - 20      GFR est AA >60 >60 ml/min/1.73m2    GFR est non-AA >60 >60 ml/min/1.73m2    Calcium 8.1 (L) 8.5 - 10.1 MG/DL    Bilirubin, total 0.3 0.2 - 1.0 MG/DL    ALT (SGPT) 21 16 - 61 U/L    AST (SGOT) 19 15 - 37 U/L    Alk. phosphatase 54 45 - 117 U/L    Protein, total 7.3 6.4 - 8.2 g/dL    Albumin 2.7 (L) 3.4 - 5.0 g/dL    Globulin 4.6 (H) 2.0 - 4.0 g/dL    A-G Ratio 0.6 (L) 0.8 - 1.7     LACTIC ACID, PLASMA    Collection Time: 06/28/17  6:29 PM   Result Value Ref Range    Lactic acid 3.1 (HH) 0.4 - 2.0 MMOL/L   MAGNESIUM    Collection Time: 06/28/17  6:29 PM   Result Value Ref Range    Magnesium 1.8 1.6 - 2.6 mg/dL   LIPASE    Collection Time: 06/28/17  6:29 PM   Result Value Ref Range    Lipase 158 73 - 393 U/L       Radiologic Studies -  The following have been ordered and reviewed:  CT ABD PELV W CONT   Final Result   IMPRESSION:     1. There is urinary bladder wall thickening suggesting cystitis.     Cellulitis of the left buttock medially. No abscess seen.  No cholelithiasis  As read by the radiologist. present.   Psychiatric:      Comments: Odd affect         Fluids    Intake/Output Summary (Last 24 hours) at 8/6/2024 1036  Last data filed at 8/6/2024 0800  Gross per 24 hour   Intake --   Output 900 ml   Net -900 ml       Laboratory  Recent Labs     08/05/24  1333   WBC 8.5   RBC 3.85*   HEMOGLOBIN 12.0*   HEMATOCRIT 34.8*   MCV 90.4   MCH 31.2   MCHC 34.5   RDW 44.1   PLATELETCT 227   MPV 10.6     Recent Labs     08/05/24  1333 08/06/24  0036   SODIUM 132* 135   POTASSIUM 4.7 3.5*   CHLORIDE 98 101   CO2 20 22   GLUCOSE 351* 192*   BUN 26* 23*   CREATININE 0.77 0.68   CALCIUM 9.0 8.2*     Recent Labs     08/05/24  1333   APTT 27.6   INR 0.95         Recent Labs     08/06/24  0036   TRIGLYCERIDE 881*   HDL 31*   LDL see below       Imaging  MR-BRAIN-W/O   Final Result      1.  Diffuse extensive leukoencephalopathy. This is unchanged since the previous MRI dated 8/14/2011.   2.  No acute abnormality.      DX-CHEST-PORTABLE (1 VIEW)   Final Result      1.  Hypoinflation with elevation of RIGHT hemidiaphragm.   2.  No pneumonia or pulmonary edema.      CT-CTA NECK WITH & W/O-POST PROCESSING   Final Result      1.  CT angiogram of the neck within normal limits.   2.  Redemonstrated heterogeneous midline anterior neck mass, unchanged from previous exam.      CT-CTA HEAD WITH & W/O-POST PROCESS   Final Result      CT angiogram of the [place] within normal limits.      CT-CEREBRAL PERFUSION ANALYSIS   Final Result      1. Cerebral blood flow less than 30% possibly representing completed infarct = 1 mL. Based on distribution of this finding, this is likely to represent artifact.      2. T Max more than 6 seconds possibly representing combination of completed infarct and ischemia = 7 mL. Based on the distribution of this finding, this is likely to represent artifact.      3. Mismatched volume possibly representing ischemic brain/penumbra= 6 mL      4.  Please note that this cerebral perfusion study and report is  "Quantitative and targets supratentorial (cerebral) perfusion for evaluation of large vessel territory acute ischemia/infarction. For example, lacunar infarcts, and brainstem/posterior fossa    ischemia/infarction are not evaluated on this study.  Data acquisition is subject to artifacts which can yield non-anatomically plausible perfusion maps which may be due to motion, bolus timing, signal to noise ratio, or other technical factors.    Perfusion map abnormalities which show non-anatomic distributions are likely artifact.   This study is not \"stand-alone\" and should only be utilized for diagnosis, management/treatment in correlation with CT, CTA, and/or MRI and clinical factors.         CT-HEAD W/O   Final Result      1.  No acute intracranial abnormality.   2.  Diffuse low density again seen throughout the white matter, similar to prior exam, favoring demyelinating process.                    Assessment/Plan  * Right sided weakness- (present on admission)  Assessment & Plan  Per Dr. Delong, neurology, no further stroke workup necessary. Recommends psychiatric evaluation due to likely conversion disorder  -psychiatric consult placed    - Will continue q 4 hr eval  - PT/OT    Uncontrolled type 2 diabetes mellitus with hyperglycemia (HCC)- (present on admission)  Assessment & Plan  A1c>10.    - accucheck ac/hs with low dose ssi coverage  - Continue lantus 40 units HS  - Continue metformin  - DM diet    Hypothyroidism- (present on admission)  Assessment & Plan  - continue levothyroxine    Other hyperlipidemia- (present on admission)  Assessment & Plan  - continue statin, fenofibrate    Anxiety and depression- (present on admission)  Assessment & Plan  - resume zoloft and trazodone    Asthma- (present on admission)  Assessment & Plan  - Albuterol PRN         VTE prophylaxis: VTE Selection    I have performed a physical exam and reviewed and updated ROS and Plan today (8/6/2024). In review of yesterday's note (8/5/2024), " XR CHEST PORT    (Results Pending)     9:46 PM  RADIOLOGY FINDINGS  Chest X-ray shows Slight haziness to right upper lobe. Poor inspiratory effort. Pending review by Radiologist  Recorded by Estevan Bo, ED Scribe, as dictated by Krista Bernardo MD     Medical Decision Making   I am the first provider for this patient. I reviewed the vital signs, available nursing notes, past medical history, past surgical history, family history and social history. Vital Signs-Reviewed the patient's vital signs. Patient Vitals for the past 12 hrs:   Temp Pulse Resp BP SpO2   06/28/17 1739 100 °F (37.8 °C) 91 20 132/73 100 %       Pulse Oximetry Analysis - Normal 100% on room air     Cardiac Monitor:   Rate: 60 bpm  Rhythm: Normal Sinus Rhythm     Provider Notes: Ddx: Cellulitis, abscess, rule out underlying rash. Cannot exclude insect bites. Fever: likely due to cellulitis, given that the patient has possible abdominal pain and diarrhea per the caretaker, will need to rule out acute intraabdominal process     Procedures:   Procedures    ED Course:  5:53 PM  Initial assessment performed. The patients presenting problems have been discussed, and they are in agreement with the care plan formulated and outlined with them. I have encouraged them to ask questions as they arise throughout their visit. 9:48 PM Discussed patient's history, exam, and available diagnostics results with Janett Angela MD, internal medicine, who requests that we order a Doppler study to rule out DVT on the right. He will admit the patient to Medical.    1000pm pt re-assessed, does not appear toxic or septic, has baseline mental status. D/w caretaker at bedside regarding plan to admit.     Medications Given in the ED:  Medications   sodium chloride (NS) flush 5-10 mL (not administered)   sodium chloride 0.9 % bolus infusion 2,436 mL (not administered)   cefTRIAXone (ROCEPHIN) 1 g in 0.9% sodium chloride (MBP/ADV) 50 mL MBP (not administered) there are no changes except as documented above.    Greater than 38  minutes spent prepping to see patient (e.g. review of tests) obtaining and/or reviewing separately obtained history. Performing a medically appropriate examination and/ evaluation.  Counseling and educating the patient/family/caregiver.  Ordering medications, tests, or procedures.  Referring and communicating with other health care professionals.  Documenting clinical information in EPIC.  Independently interpreting results and communicating results to patient/family/caregiver.  Care coordination.        vancomycin (VANCOCIN) 1,000 mg in 0.9% sodium chloride (MBP/ADV) 250 mL adv (not administered)   sodium chloride 0.9 % bolus infusion 1,000 mL (0 mL IntraVENous IV Completed 6/28/17 1926)   iopamidol (ISOVUE 300) 61 % contrast injection 100 mL (100 mL IntraVENous Given 6/28/17 2010)     9:51 PM  Patient is being admitted to the hospital by Dano Tate MD to Medical. The results of their tests and reasons for their admission have been discussed with them and/or available family. They convey agreement and understanding for the need to be admitted and for their admission diagnosis. Diagnosis   Clinical Impression:   1. Cellulitis and abscess of leg, except foot    2. Cellulitis of buttock    3. SIRS (systemic inflammatory response syndrome) ([facility]       _______________________________   Attestations: This note is prepared by Lex Shi, acting as a Scribe for Lila Shaw MD on 5:53 PM on 6/28/2017 . Lila Shaw MD: The scribe's documentation has been prepared under my direction and personally reviewed by me in its entirety.   _______________________________

## 2024-08-06 NOTE — PROGRESS NOTES
Patient arrived to floor. Assumed care at 1720. Assessment complete, A&Ox4, Neuro finding inconsistent, pt seen using right hand to hold cell phone, little movement during eval.  Admission record complete, Patient on monitor, Monitor room notified, SB, BBB. Pt oriented to room, educated on call light/extension/phone system, white board updated. Fall assessment complete, patient educated to call for assistance, pt verbalizes understanding. Pt denies pain or any additional needs at this time. Questions and concerns addressed. Call light, phone, and personal belongings within reach.

## 2024-08-06 NOTE — PROGRESS NOTES
Assumed care of patient. He is awake in bed, VSS, using cell phone. SR on monitor. Awaiting formal speech eval and psych eval. Call light in reach, bed alarm in place.

## 2024-08-06 NOTE — PROGRESS NOTES
Bedside swallow eval completed. NP, Jose De Jesus would like SPL to eval. Prior to diet order. Pt aware.

## 2024-08-06 NOTE — ASSESSMENT & PLAN NOTE
A1c>10.    - accucheck ac/hs with low dose ssi coverage  - Continue lantus 40 units HS  - Continue metformin  - DM diet

## 2024-08-06 NOTE — CARE PLAN
The patient is Watcher - Medium risk of patient condition declining or worsening         Progress made toward(s) clinical / shift goals:    Problem: Optimal Care of the Stroke Patient  Goal: Optimal emergency care for the stroke patient  Description: Target End Date:  End of day 1    Time of Onset    1.  Time of last known well obtained  2.  Patient and family/caregiver verbalize understanding of diagnosis, medications and testing  3.  NIHSS performed and documented, including date and time, for ischemic stroke patients prior to any acute recanalization therapy (thrombolytics or mechanical) or within 12 hours of arrival if no intervention is warranted  4.  Consults and referrals placed to appropriate departments    Medications Administration as Ordered:    1.  Implement appropriate reversal agents for INR greater than 1.5  2.  Pre-alteplase administration of antihypertensives for SBP >185 DBP >110  3.  Post-alteplase administration of antihypertensives for SBP >185, DBP >105  4.  Thrombolytic Therapy for qualifying ischemic stroke patients who arrive within 4.5 hours of time of Last Known Well. Thrombolytic therapy administered within 30 minutes or a documented reason for delay  Outcome: Progressing     Problem: Knowledge Deficit - Stroke Education  Goal: Patient's knowledge of stroke and risk factors will improve  Description: Target End Date:  1-3 days or as soon as patient condition allows    Document in Patient Education    1.  Stroke education booklet provided  2.  Education regarding EMS activation, need for follow up, medication prescribed at discharge, risk factors for stroke/lifestyle modifications, warning signs and symptoms of stroke provided  Outcome: Progressing       Patient is not progressing towards the following goals:

## 2024-08-06 NOTE — ED NOTES
Med rec is complete per Patient at bedside and [facility] .  Pharmacy hasn't filled Trazodone lately, but pt says he is still taking it.       Allergies reviewed.    Has patient had any outside antibiotics in the last 30 days? n    Any Anticoagulants (rivaroxaban, apixaban, edoxaban, dabigatran, warfarin, enoxaparin) taken in the last 14 days? n       Pharmacy/Pharmacies Pt utilizes : [facility].

## 2024-08-06 NOTE — PROGRESS NOTES
2 RN Skin Assessment Completed      Head: WDL  Ears: WDL  Nose: WDL  Mouth: WDL  Neck: WDL  Breasts/Chest: WDL  Shoulder Blades: WDL  Spine: WDL  (R) Arm/Elbow/hand: WDL  (L) Arm/Elbow/hand: psoriasis spots  Abdomen: WDL   Groin: WDL  Sacrum/Coccyx/Buttocks: blanching  (R) Leg: WDL  (L) Leg: psoriasis spots  (R) Heel/Foot/Toe: black callused soles, thick outgrown nails  (L) Heel/Foot/Toe: black callused soles, thick outgrown nails              Devices in place: BP Cuff and Pulse Ox     Interventions in place: Gray ear foams and Pillows     Possible skin injury found: No     Pictures uploaded into Epic: N/A  Wound Consult Placed: N/A

## 2024-08-06 NOTE — ASSESSMENT & PLAN NOTE
Per Dr. Delong, neurology, no further stroke workup necessary. Recommends psychiatric evaluation due to likely conversion disorder  -psychiatric consult placed    - Will continue q 4 hr eval  - PT/OT

## 2024-08-06 NOTE — CONSULTS
PSYCHIATRIC CONSULTATION - Intake  New Patient    DOS: 08/06/24     Reason for Admission: 44 y.o. male with pmh of uncontrolled diabetes type 2, hypothyroidism who presented to the ED on 8/5/2024 with complaints of right-sided weakness  Reason for Consult: concern for conversion disorder.   Requesting LIP: ANIKET Drummond   Psychiatric Consultant: SONI Vincent    Legal Status: not applicable    Chart Review: active problem list, medication list, allergies, family history, social history, health maintenance, notes from last encounter, lab results    CC:   Chief Complaint   Patient presents with    Unilateral Weakness     Pt reports right sided weakness, began around noon today. Hx stroke with R sided deficits. States he normally is weak at baseline but began much worse today.     High Blood Sugar     Pt reports he has not had his insulin or metformin for 2 days.  per EMS       HPI:   Patient is a 44  year old male with reported Hx of borderline personality, bipolar, anxiety, depression, and PTSD being evaluated for suspected conversation disorder. Patient state was cleaning his house with CM present when noticed tingling that progressed to paralysis prior to arrival at the hospital. Patient somewhat poor historian, providing different details about certain situations when confronted with known information based on previous encounter.     Admits insomnia 2/2 environmental factors, sleeping 4H QHS without interruption, wakes feeling rested. Reports Sx of depression including: decreased interests. Admits guilt related to loss of relationship. Vocalizing reasons for living including his 2 children.     Reports Sx of anxiety including: restlessness, poor concentration, irritability.  Unable to vocalize Sx consistent with panic disorder.    Unable to vocalize Sx consistent with bipolar disorder, states moods go from high to low, expressing he can blackout if he gets upset. Denies prolonged periods  "of restlessness, or increased energy.    Reports Sx of PTSD including: nightmares, and anxiety.     Patient denies Sx of w/d, denies thoughts of self-harm, denies current SI/HI, A/VH. Patient denies symptoms indicative of psychosis, dangelo/hypomania, OCD, or ADHD.     Medications:  Scheduled Medications   Medication Dose Frequency    insulin regular  1-6 Units 4X/DAY ACHS    busPIRone  10 mg BID    heparin  5,000 Units Q8HRS    aspirin  81 mg DAILY    Or    aspirin  300 mg DAILY    aspirin  81 mg DAILY    atorvastatin  40 mg Nightly    fenofibrate micronized  201 mg DAILY    levothyroxine  200 mcg AM ES    levothyroxine  25 mcg AM ES    loratadine  10 mg DAILY    lurasidone  80 mg PM MEAL    metformin  2,000 mg DAILY    montelukast  10 mg DAILY    sertraline  150 mg DAILY    prazosin  5 mg Nightly    vitamin D3  1,000 Units QHS    insulin GLARGINE  40 Units Q EVENING       Allergies:   Allergies   Allergen Reactions    Abilify      \"Feeling tired, like I don't even know whats going on around me\"    Fish      Pt reports salmon causes him to be sick to his stomach  Not listed on MAR noted 2/3/2021      Geodon [Ziprasidone Hcl] Anaphylaxis     Anaphylaxis per patient    Aripiprazole      \"I became lethargic.\"    Hydroxyzine Itching     Pt will only state \"I feel itchy when I take it    Ziprasidone         MSE:  /66   Pulse (!) 56   Temp 36.3 °C (97.3 °F) (Temporal)   Resp 16   Ht 1.981 m (6' 6\")   Wt 115 kg (252 lb 6.8 oz)   SpO2 95%     Constitutional: as noted above  General Appearance/Behavior: intermittent eye contact and superficially cooperative, Manipulative  Abnormal Movements: none, no PMA/PMR or tremor observed.  Gait and Posture: not observed  Musculoskeletal: as noted above  Mood: \"fine\"  Affect: Restricted   Speech: stuttering  Language:  spontaneous and comprehends spoken commands   Thought Process: Perseverative  Thought Content: Denies SI/HI, A/VH. No e/o delusions, paranoia, or internal " preoccupation  Insight/Judgement:  limited/limited  Alert/Orientation: alert, oriented to person, place and time  Attn/Concentration: normal  Fund of Knowledge: not tested  Memory recent/remote: No gross evidence of memory deficits  MMSE: deferred this visit     Medical ROS:  Review of systems (+10 systems) unremarkable except for concerns noted by patient or items listed.  Please see HPI for additional ROS.    Past Psychiatric Hx:  Dx: bipolar, PTSD, anxiety, depression  IP:  Yes, [facility], [place] [facility]  OP: [facility], Dr Corona  SI/SAs: Denies  Medication Trials: prazosin, Depakote, Topamax, latuda, trazadone, zoloft, buspar, hydroxizine, xanax  Violence/HI: denies  Weapons: denies access    Substance Hx:  Alcohol: admits drinking up to 2 bottles of wine, denies daily use  Cannabis: smoking 1 joint every 3 days, initially reported as once every 3 months  Nicotine: smoking via vapes, refillable pen    Social History     Substance and Sexual Activity   Drug Use Not Currently    Types: Inhaled    Comment: marijuana every few days     Substance Tx: No  Denies Hx of SZ, DT    Family Psych Hx:   Paternal:  opioid use    Family History   Problem Relation Age of Onset    Hypertension Mother     Heart Disease Mother     Lung Disease Mother     Stroke Maternal Grandmother         Social Hx:  Born in [facility]  Education: IE, states graduated HS in 2000  Abuse: denies, reports emotional abuse, watching his father overdose on heroin  Reports losing his grandparents, father, and girlfriend as triggering events when asked about anxiety/PTSD, most recent event > 2 years ago  Support: Limited, has  through [facility]  Has brother, estranged  Has 2 children currently in foster care  Housing: lives alone in apartment  Financial: has payee, received food assistance    Social History     Tobacco Use    Smoking status: Former     Current packs/day: 0.00     Types: Cigarettes, Cigars     Quit date: 4/1/2020     Years since quitting:  4.3    Smokeless tobacco: Never   Vaping Use    Vaping status: Every Day    Start date: 8/1/2023    Substances: Nicotine, THC, CBD, Flavoring    Devices: Disposable, Pre-filled or refillable cartridge, Pre-filled pod   Substance Use Topics    Alcohol use: Not Currently     Comment: occasionally    Drug use: Not Currently     Types: Inhaled     Comment: marijuana every few days        Legal Hx:   Yes, Hx of indecent exposure     Past Medical Hx:  Past Medical History:   Diagnosis Date    Anxiety     BIPOLAR    ASTHMA     Bipolar 1 disorder ([facility])     Depression     Diabetes ([facility])     Type II Diabetes    Fall     passed out 2 wks ago    Glaucoma     Glaucoma 1982    both eyes/ blind on left eye    Hypothyroidism     Indigestion     once in a while    Mental disorder     learning disabilities; speech impairment; developmental delays    Murmur     since birth    Pneumonia     remote    Psychiatric problem 2002    PTSD    S/P thyroidectomy     Seizure ([facility]) 2010    Seizure disorder ([facility])     Unspecified disorder of thyroid       Patient Active Problem List    Diagnosis Date Noted    Type 1 diabetes mellitus with hyperglycemia ([facility]) 01/08/2024    Acute cough 08/13/2023    Weakness 06/10/2023    Uncontrolled type 2 diabetes mellitus with hyperglycemia ([facility]) 06/09/2023    Conversion disorder 02/06/2021    Seizure disorder ([facility]) 10/17/2020    Seizure ([facility]) 08/31/2020    Vision changes 03/03/2020    Primary hypertension 02/29/2020    UTI (urinary tract infection) 12/29/2019    Hypothyroidism 08/02/2019    Rash 07/22/2019    Urticaria of entire body 08/19/2018    Other hyperlipidemia 08/19/2018    Intractable abdominal pain 07/28/2018    Class 1 obesity due to excess calories with serious comorbidity and body mass index (BMI) of 32.0 to 32.9 in adult 07/28/2018    Pre-ulcerative corn or callous 02/04/2018    Type 2 diabetes mellitus without complication, without long-term current use of insulin ([facility]) 02/02/2018    Right  hemiparesis (HCC) 09/12/2017    Hyperglycemia 09/12/2017    PTSD (post-traumatic stress disorder) 03/06/2016    Pansinusitis 05/27/2015    Change in mental status 05/26/2015    History of seizure 12/01/2014    Patient non adherence 10/16/2014    Headache 08/06/2014    Syncope 07/27/2014    Psychiatric problem     Anemia 05/03/2013    Neck mass 12/06/2012    Paralysis on one side of body (HCC) 08/05/2012    Asthma 08/15/2011    Bradycardia 08/15/2011    Right sided weakness 08/15/2011    Glaucoma 08/15/2011    Postoperative hypothyroidism 08/15/2011    Anxiety and depression 08/15/2011       Labs:  Reviewed:   Lab Results   Component Value Date/Time    AMPHUR Negative 10/28/2023 0630    BARBSURINE Negative 10/28/2023 0630    BENZODIAZU Negative 10/28/2023 0630    COCAINEMET Negative 10/28/2023 0630    METHADONE Negative 10/28/2023 0630    ECSTASY Negative 03/05/2016 2045    OPIATES Positive (A) 10/28/2023 0630    OXYCODN Negative 10/28/2023 0630    PCPURINE Negative 10/28/2023 0630    PROPOXY Negative 10/28/2023 0630    CANNABINOID Negative 10/28/2023 0630     Recent Labs     08/05/24  1333   WBC 8.5   RBC 3.85*   HEMOGLOBIN 12.0*   HEMATOCRIT 34.8*   MCV 90.4   MCH 31.2   RDW 44.1   PLATELETCT 227   MPV 10.6   NEUTSPOLYS 55.70   LYMPHOCYTES 32.30   MONOCYTES 7.60   EOSINOPHILS 1.50   BASOPHILS 1.80     Recent Labs     08/05/24  1333 08/06/24  0036   SODIUM 132* 135   POTASSIUM 4.7 3.5*   CHLORIDE 98 101   CO2 20 22   GLUCOSE 351* 192*   BUN 26* 23*     Recent Labs     08/05/24  1333   ASTSGOT 11*   ALTSGPT 13   TBILIRUBIN 0.3   ALKPHOSPHAT 82   GLOBULIN 2.2   INR 0.95       EKG:   Results for orders placed or performed during the hospital encounter of 08/05/24   EKG (NOW)   Result Value Ref Range    Report       [facility]    Test Date:  2024-08-05  Pt Name:    HOLLY KIM                 Department: ER  MRN:        [id number]                      Room:       [facility]  Gender:     Male                          Technician: [id number]  :        1980                   Requested By:BERNICE JAIN  Order #:    [id number]                    Reading MD:    Measurements  Intervals                                Axis  Rate:       63                           P:          50  MO:         179                          QRS:        35  QRSD:       119                          T:          46  QT:         391  QTc:        401    Interpretive Statements  Sinus rhythm  Nonspecific intraventricular conduction delay  Low voltage, extremity leads  Compared to ECG 2024 16:16:32  No significant changes          =======================================================================================================      PSYCHIATRIC CONSULTATION - Intake    Assessment:  Patient being assessed with complaint of right sided weakness with suspected conversation disorder with Hx of similar presentation. Reports noticing Sx including tingling and loss of sensation while CM was present in home. Admits drinking alcohol over the weekend and not taking medications for at least 3 days prior to event. Patient has awareness about current situation, appears to have limited insight into mood component about current Sx experienced. Reported Dx of bipolar disorder, borderline personality disorder, PTSD and anxiety; has OP provider appointment in scheduled for this month. Patient has awareness of resources, no recent IP mental health hospitalization. Remains focused on increasing anxiety 2/2 helping out around his living environment; agreeable to restart Buspar for anxiety which has been effective in the past. Perseverates on getting his dogs certified as emotional support animals, stating they cannot stay with him otherwise.    Patient has protective factors including: has OP psychiatric services, Hx of adherence with recommended Tx, positive coping skills. Notable risk factors include: reported DAVEY, prior SA, limited support in  area, limited coping skills.    Patient denies SI/HI/AH/VH, no Sx of psychosis reported or observed. Patient does not present an acute danger to self or others. Does not meet criteria for legal hold. Patient could benefit from psychotherapy if remains hospitalized without resolution.    Admission Dx:  1. Weakness         Psychiatric:   Alcohol use disorder  Mood disorder NOS R/O substance induced  Anxiety disorder, generalized  PTSD  Intellectual delay    Medical: as noted by the medical team.     Recommendations:  Legal Hold: not applicable     Observation status:   - No sitter needed    Visitors: Yes  Personal belongings: Yes    Discussed/voalted: Mera ANNE; JOB Bryant MD    Medications and treatment: Final orders per Treatment Team  Psychotherapy when available   Consider Buspar 10mg PO BID for anxiety   Risks/benefits/side effects discussed, patient verbalized understanding.  Medication reconciliation was completed.  Consider labs UA, TSH    Reviewed safety plan: 911, ER, PCM, MHC, suicide crisis line, nursing staff while inpatient.    Signing Off. Thank you for the consult.       Discharge recommendations:   Patient established with [facility] for outpatient psychiatric services and axillary services.  Could benefit from PT/OT consult to help identify necessary supportive services to progress towards goal.

## 2024-08-06 NOTE — THERAPY
"Speech Language Pathology   Clinical Swallow Evaluation     Patient Name: Norm Prabhakar  AGE:  44 y.o., SEX:  male  Medical Record #: [id number]  Date of Service: 8/6/2024      History of Present Illness  43 y/o male admitted 8/5 with R-side weakness. Neurologoy consulted and did not recommend further stroke work-up as pt \"is well known to neurology service and has never had findings of acute stroke. Patient apparently has had at least 20 or so CTA's of head and neck and MRI brain in the past eight years.\"    CMHx: R side weakness,   PMHx: DM2, asthma, HLD, hypothyroidism, PTSD, conversion disorder, neck mass    Last seen by SLP in 8/2023 where MBSS showed a normal oropharyngeal swallow.     CT Head 8/5:  \"1.  No acute intracranial abnormality.  2.  Diffuse low density again seen throughout the white matter, similar to prior exam, favoring demyelinating process.\"    CXR 8/5:  \"1.  Hypoinflation with elevation of RIGHT hemidiaphragm.  2.  No pneumonia or pulmonary edema.\"      General Information:  Vitals  O2 Delivery Device: None - Room Air  Level of Consciousness: Alert, Awake  Patient Behaviors: Calm, cooperative  Orientation: Oriented x 4  Follows Directives: Yes      Prior Living Situation & Level of Function:  Housing / Facility: 2 Story Apartment / Condo  Lives with - Patient's Self Care Capacity: Alone and Able to Care For Self     Communication: WFL per patient - \"I can normally talk much better than this.\"  Swallowing: WFL per patient and per EMR - patient had MBSS completed in 2023 which showed normal oropharyngeal swallow       Oral Mechanism Evaluation:  Dentition: Fair, Natural dentition, Some missing dentition, Class 3 malocclusion  Facial Symmetry: Equal  Facial Sensation: Bilaterally sensate, reports decreased sensation on the R  Labial Observations: Open mouth posture from malocclusion, otherwise WFL  Lingual Observations: Midline  Motor Speech: No dysarthria noted. Inconsistent dysfluency during " "speech. No negative impact on intelligibility  Comments: Pt able to lateralize to both buccal cavities during oral mechanism exam but it appeared laborious and ROM was slow. These deficits were NOT noted with PO as patient had timely and reportedly easy bilateral movement of tongue to clear cracker residue from posterior dentition         Laryngeal Function:  Secretion Management: Adequate  Voice Quality: WFL  Cough: Perceptually WNL      Subjective  Pt agreeable and cooperative with SLP evaluation tasks. Tangential answers to SLP questions at times. \"I like applesauce.\"      Assessment  Current Method of Nutrition: NPO until cleared by speech pathology  Positioning: Shell's (60-90 degrees)  Bolus Administration: Patient    O2 Delivery Device: None - Room Air  Factor(s) Affecting Performance: None  Tracheostomy : No    Swallowing Trials:  Thin Liquid (TN0): WFL  Liquidised (LQ3): WFL  Easy to Chew (EC7): WFL      Comments:   Pt w/ adequate self-feeding. Appropriate oral bolus stripping and containment. Presumed complete AP transit with effective bolus formation evidenced by complete clearance of bolus from oral cavity. Functional and timely mastication of solid consistencies with reportedly easy bilateral movement of tongue to clear cracker residue from posterior dentition  . No overt s/sx of aspiration noted w/ single and sequential sips of TN0 water including with completion of 3 oz water test. One to two swallows appreciated per bolus.       Clinical Impressions  Pt presents without s/sx concerning for oropharyngeal dysphagia this date. Recommend initiation of RG/TN diet with PO medication administration. Given that pt appears to be at baseline without dysphagia concern, SLP will not follow. Please re-consult SLP with s/sx concerning for aspiration or change in pt status.    Orders also received for cognitive eval/SLE, which will be completed as schedule allows if indicated given results of MRI brain " (pending).      Recommendations  Regular solids / thin liquids  Instrumentation: None indicated at this time  Medication: As tolerated  Supervision: Independent  Positioning: Fully upright and midline during oral intake, Meals sitting upright in a chair, as tolerated  Risk Management : Slow rate of intake, Physical mobility, as tolerated  Oral Care: BID       SLP Treatment Plan  Treatment Plan: None Indicated  SLP Frequency: N/A - Evaluation Only  Estimated Duration: N/A - Evaluation Only      Anticipated Discharge Needs  Discharge Recommendations: Anticipate that the patient will have no further speech therapy needs after discharge from the hospital   Therapy Recommendations Upon DC: Not Indicated        Little Cardozo, SLP

## 2024-08-06 NOTE — RESPIRATORY CARE
COPD EDUCATION by COPD CLINICAL EDUCATOR  8/6/2024 at 8:21 AM by Marilou Díaz, RRT     Patient reviewed by education team. Patient does not have a history or diagnosis of COPD and is a non-smoker. He has confirmed Asthma on a rescue inhaler. Therefore, patient does not qualify for the COPD program.

## 2024-08-06 NOTE — HOSPITAL COURSE
Norm Prabhakar is a 44 y.o. male with pmh of uncontrolled diabetes type 2, hypothyroidism who presented to the ED on 8/5/2024 with complaints of right-sided weakness. Patient reports a history of right-sided weakness due to previous stroke, but says that he has been unable to move his right arm today. He says he was cleaning his apartment today when his symptoms occurred. He denies any trauma to this arm, denies any changes to other extremities, denies changes in speech, swallowing. He denies dizziness, lightheadedness, shortness of breath, chest pain or palpitations. Denies recent cold or flulike symptoms. He says he has not taken any of his medications, including his diabetic medication the last couple days, as he was visiting with a friend and drinking some wine and did not want to mix alcohol with his medications.     ERP discussed case with neurologist, Dr. Garcia, who recommends no further stroke workup, as this patient has had multiple workup in the past and is well known to neurology service and has never had findings of acute stroke. Patient apparently has had at least 20 or so CTA's of head and neck and MRI brain in the past eight years at multiple institutions, per Dr. Garcia.   A nurse evaluation, patient was using his phone in his right hand, and using his right arm. However, he put this down on her entry to the room, and said he was unable to move his arm.      CBC significant for hemoglobin 12, but otherwise unremarkable. CMP shows sodium 132, glucose 351, BUN 26, otherwise unremarkable. A1c 10.3, troponin 24. EKG with no acute ST-T changes. CT head, CTA head and neck, show no acute findings under similar to prior. Chest x-ray with no acute findings.EKG with no acute STD changes.

## 2024-08-06 NOTE — THERAPY
Physical Therapy   Initial Evaluation     Patient Name: Norm Prabhakar  Age:  44 y.o., Sex:  male  Medical Record #: [id number]  Today's Date: 8/6/2024     Precautions  Precautions: (P) Fall Risk    Assessment  Patient is 44 y.o. male with a diagnosis of right sides weakness. Imaging negative.    Additional factors influencing patient status / progress h/o PTSD, multiple hospital admissions, past conversion disorders, anxiety, depression.    Pt presenting to hospital with c/o right sided weakness.   Pt demonstrating normal tone in RUE and RLE as well as ability to grasp FWW and maintain weight on RLE during ambulation.   Formal MMT demonstrated 0/5 RUE and RLE. Recommend continued PT while in house. Per EMR, pt  has history of conversion disorders and has progressed back to baseline with the  help of therapy services.     Plan    Physical Therapy Initial Treatment Plan   Treatment Plan : (P) Bed Mobility, Gait Training, Manual Therapy, Neuro Re-Education / Balance, Self Care / Home Evaluation, Stair Training, Therapeutic Activities, Therapeutic Exercise  Treatment Frequency: (P) 4 Times per Week  Duration: (P) Until Therapy Goals Met    DC Equipment Recommendations: (P) Unable to determine at this time  Discharge Recommendations: (P) Recommend post-acute placement for additional physical therapy services prior to discharge home (HH if condition improves.)         Precautions   Precautions Fall Risk   Pain 0 - 10 Group   Therapist Pain Assessment Post Activity Pain Same as Prior to Activity;5   Prior Living Situation   Prior Services None   Housing / Facility 1 Story Apartment / Condo   Steps Into Home 0   Steps In Home 0   Equipment Owned Single Point Cane   Lives with - Patient's Self Care Capacity Alone and Able to Care For Self   Prior Level of Functional Mobility   Bed Mobility Independent   Transfer Status Independent   Ambulation Independent   Ambulation Distance community   Assistive Devices Used Single Point  "Cane   Stairs Independent   Cognition    Level of Consciousness Alert   Strength Upper Body   Comments able to  FWW for sit to stand as steps.   Upper Body Muscle Tone   Upper Body Muscle Tone  WDL   Active ROM Lower Body    Comments LLE:WNL. RLE: pt unable to move to commands but does move during functional tasks.   Strength Lower Body   Comments LLE 5/5. RLE unreliable MMT. able to support body weight during stepping   Lower Body Muscle Tone   Lower Body Muscle Tone  WDL   Vision   Vision Comments no c/o visual changes.   Balance Assessment   Sitting Balance (Static) Good   Sitting Balance (Dynamic) Good   Standing Balance (Static) Fair +   Standing Balance (Dynamic) Fair   Weight Shift Sitting Good   Weight Shift Standing Poor   Comments w/FWW   Bed Mobility    Supine to Sit Minimal Assist   Sit to Supine Minimal Assist   Gait Analysis   Gait Level Of Assist Minimal Assist   Assistive Device Front Wheel Walker   Distance (Feet) 5   # of Times Distance was Traveled 1   Deviation   (manual assist with RLE advancement.)   Weight Bearing Status no restrictions.   Functional Mobility   Sit to Stand Contact Guard Assist   6 Clicks Assessment - How much HELP from another person do you currently need... (If the patient hasn't done an activity recently, how much help from another person do you think he/she would need if he/she tried?)   Turning from your back to your side while in a flat bed without using bedrails? 3   Moving from lying on your back to sitting on the side of a flat bed without using bedrails? 3   Moving to and from a bed to a chair (including a wheelchair)? 3   Standing up from a chair using your arms (e.g., wheelchair, or bedside chair)? 3   Walking in hospital room? 2   Climbing 3-5 steps with a railing? 1   6 clicks Mobility Score 15   Activity Tolerance   Sitting Edge of Bed 12 min   Standing 3 min   Patient / Family Goals    Patient / Family Goal #1 \" I hope I can go to a nursing home\"   Short " Term Goals    Short Term Goal # 1 Pt will perform bed mobility at S level by tx 6   Short Term Goal # 2 Pt will transfer with FWW at S level by tx 6   Short Term Goal # 3 Pt will ambulate for 100 ft with FWW at S level by tx 6   Education Group   Education Provided Role of Physical Therapist;Gait Training;Use of Assistive Device   Role of Physical Therapist Patient Response Patient;Acceptance;Explanation;Verbal Demonstration   Gait Training Patient Response Patient;Acceptance;Explanation;Reinforcement Needed   Use of Assistive Device Patient Response Patient;Acceptance;Explanation;Reinforcement Needed   Physical Therapy Initial Treatment Plan    Treatment Plan  Bed Mobility;Gait Training;Manual Therapy;Neuro Re-Education / Balance;Self Care / Home Evaluation;Stair Training;Therapeutic Activities;Therapeutic Exercise   Treatment Frequency 4 Times per Week   Duration Until Therapy Goals Met   Problem List    Problems Impaired Bed Mobility;Impaired Transfers;Impaired Ambulation;Functional Strength Deficit;Decreased Activity Tolerance   Anticipated Discharge Equipment and Recommendations   DC Equipment Recommendations Unable to determine at this time   Discharge Recommendations Recommend post-acute placement for additional physical therapy services prior to discharge home  (HH if condition improves.)   Interdisciplinary Plan of Care Collaboration   IDT Collaboration with  Nursing   Patient Position at End of Therapy In Bed;Call Light within Reach;Tray Table within Reach;Phone within Reach   Collaboration Comments RN updated.   Session Information   Date / Session Number  8/6-1 ( 1/4,8/12)

## 2024-08-06 NOTE — CARE PLAN
Problem: Optimal Care of the Stroke Patient  Goal: Optimal emergency care for the stroke patient  Outcome: Progressing  Goal: Optimal acute care for the stroke patient  Outcome: Progressing     Problem: Knowledge Deficit - Stroke Education  Goal: Patient's knowledge of stroke and risk factors will improve  Outcome: Progressing     Problem: Psychosocial - Patient Condition  Goal: Patient's ability to verbalize feelings about condition will improve  Outcome: Progressing  Goal: Patient's ability to re-evaluate and adapt role responsibilities will improve  Outcome: Progressing     Problem: Discharge Planning - Stroke  Goal: Ensure Stroke Core Measures are met prior to discharge  Outcome: Progressing  Goal: Patient’s continuum of care needs will be met  Outcome: Progressing     Problem: Neuro Status  Goal: Neuro status will remain stable or improve  Outcome: Progressing     Problem: Hemodynamic Monitoring  Goal: Patient's hemodynamics, fluid balance and neurologic status will be stable or improve  Outcome: Progressing     Problem: Respiratory - Stroke Patient  Goal: Patient will achieve/maintain optimum respiratory rate/effort  Outcome: Progressing     Problem: Dysphagia  Goal: Dysphagia will improve  Outcome: Progressing     Problem: Risk for Aspiration  Goal: Patient's risk for aspiration will be absent or decrease  Outcome: Progressing     Problem: Urinary Elimination  Goal: Establish and maintain regular urinary output  Outcome: Progressing     Problem: Bowel Elimination  Goal: Establish and maintain regular bowel function  Outcome: Progressing     Problem: Mobility - Stroke  Goal: Patient's capacity to carry out activities will improve  Outcome: Progressing  Goal: Spasticity will be prevented or improved  Outcome: Progressing  Goal: Subluxation will be prevented or improved  Outcome: Progressing     Problem: Self Care  Goal: Patient will have the ability to perform ADLs independently or with assistance (bathe,  groom, dress, toilet and feed)  Outcome: Progressing     Problem: Knowledge Deficit - Standard  Goal: Patient and family/care givers will demonstrate understanding of plan of care, disease process/condition, diagnostic tests and medications  Outcome: Progressing     Problem: Skin Integrity  Goal: Skin integrity is maintained or improved  Outcome: Progressing     Problem: Fall Risk  Goal: Patient will remain free from falls  Outcome: Progressing   The patient is Stable - Low risk of patient condition declining or worsening    Shift Goals  Clinical Goals: tele monitoring, speech therapy, Q4 neuro checks, glucose monitoring,psych consult, medication administration  Patient Goals: rest, comfort  Family Goals: n/a    Progress made toward(s) clinical / shift goals:  MRI results, observation, psych consult    Patient is not progressing towards the following goals:

## 2024-08-07 LAB
GLUCOSE BLD STRIP.AUTO-MCNC: 148 MG/DL (ref 65–99)
GLUCOSE BLD STRIP.AUTO-MCNC: 154 MG/DL (ref 65–99)
GLUCOSE BLD STRIP.AUTO-MCNC: 170 MG/DL (ref 65–99)
GLUCOSE BLD STRIP.AUTO-MCNC: 237 MG/DL (ref 65–99)

## 2024-08-07 PROCEDURE — 99232 SBSQ HOSP IP/OBS MODERATE 35: CPT | Performed by: STUDENT IN AN ORGANIZED HEALTH CARE EDUCATION/TRAINING PROGRAM

## 2024-08-07 PROCEDURE — 82962 GLUCOSE BLOOD TEST: CPT | Mod: 91

## 2024-08-07 PROCEDURE — 97535 SELF CARE MNGMENT TRAINING: CPT

## 2024-08-07 PROCEDURE — G0378 HOSPITAL OBSERVATION PER HR: HCPCS

## 2024-08-07 PROCEDURE — 700111 HCHG RX REV CODE 636 W/ 250 OVERRIDE (IP): Mod: UD | Performed by: NURSE PRACTITIONER

## 2024-08-07 PROCEDURE — 99233 SBSQ HOSP IP/OBS HIGH 50: CPT | Performed by: STUDENT IN AN ORGANIZED HEALTH CARE EDUCATION/TRAINING PROGRAM

## 2024-08-07 PROCEDURE — A9270 NON-COVERED ITEM OR SERVICE: HCPCS | Mod: UD | Performed by: STUDENT IN AN ORGANIZED HEALTH CARE EDUCATION/TRAINING PROGRAM

## 2024-08-07 PROCEDURE — 700102 HCHG RX REV CODE 250 W/ 637 OVERRIDE(OP): Mod: UD | Performed by: NURSE PRACTITIONER

## 2024-08-07 PROCEDURE — 96372 THER/PROPH/DIAG INJ SC/IM: CPT

## 2024-08-07 PROCEDURE — 700102 HCHG RX REV CODE 250 W/ 637 OVERRIDE(OP): Mod: UD | Performed by: STUDENT IN AN ORGANIZED HEALTH CARE EDUCATION/TRAINING PROGRAM

## 2024-08-07 PROCEDURE — 97166 OT EVAL MOD COMPLEX 45 MIN: CPT

## 2024-08-07 PROCEDURE — A9270 NON-COVERED ITEM OR SERVICE: HCPCS | Mod: UD | Performed by: NURSE PRACTITIONER

## 2024-08-07 RX ORDER — HYDROCODONE BITARTRATE AND ACETAMINOPHEN 5; 325 MG/1; MG/1
1 TABLET ORAL EVERY 6 HOURS PRN
Status: DISCONTINUED | OUTPATIENT
Start: 2024-08-07 | End: 2024-08-10 | Stop reason: HOSPADM

## 2024-08-07 RX ADMIN — LORATADINE 10 MG: 10 TABLET ORAL at 05:54

## 2024-08-07 RX ADMIN — FENOFIBRATE 201 MG: 134 CAPSULE ORAL at 05:53

## 2024-08-07 RX ADMIN — LEVOTHYROXINE SODIUM 25 MCG: 0.03 TABLET ORAL at 05:57

## 2024-08-07 RX ADMIN — ASPIRIN 81 MG: 81 TABLET, CHEWABLE ORAL at 05:54

## 2024-08-07 RX ADMIN — HYDROCODONE BITARTRATE AND ACETAMINOPHEN 1 TABLET: 5; 325 TABLET ORAL at 11:45

## 2024-08-07 RX ADMIN — Medication 1000 UNITS: at 20:44

## 2024-08-07 RX ADMIN — HEPARIN SODIUM 5000 UNITS: 5000 INJECTION, SOLUTION INTRAVENOUS; SUBCUTANEOUS at 15:40

## 2024-08-07 RX ADMIN — HEPARIN SODIUM 5000 UNITS: 5000 INJECTION, SOLUTION INTRAVENOUS; SUBCUTANEOUS at 20:44

## 2024-08-07 RX ADMIN — INSULIN HUMAN 1 UNITS: 100 INJECTION, SOLUTION PARENTERAL at 08:21

## 2024-08-07 RX ADMIN — HEPARIN SODIUM 5000 UNITS: 5000 INJECTION, SOLUTION INTRAVENOUS; SUBCUTANEOUS at 05:56

## 2024-08-07 RX ADMIN — LEVOTHYROXINE SODIUM 200 MCG: 0.2 TABLET ORAL at 05:57

## 2024-08-07 RX ADMIN — MONTELUKAST 10 MG: 10 TABLET, FILM COATED ORAL at 20:44

## 2024-08-07 RX ADMIN — SERTRALINE HYDROCHLORIDE 150 MG: 50 TABLET ORAL at 05:54

## 2024-08-07 RX ADMIN — INSULIN HUMAN 1 UNITS: 100 INJECTION, SOLUTION PARENTERAL at 11:43

## 2024-08-07 RX ADMIN — INSULIN HUMAN 2 UNITS: 100 INJECTION, SOLUTION PARENTERAL at 20:48

## 2024-08-07 RX ADMIN — METFORMIN HYDROCHLORIDE 2000 MG: 500 TABLET ORAL at 05:53

## 2024-08-07 RX ADMIN — LURASIDONE HYDROCHLORIDE 80 MG: 40 TABLET, FILM COATED ORAL at 17:38

## 2024-08-07 RX ADMIN — ATORVASTATIN CALCIUM 40 MG: 40 TABLET, FILM COATED ORAL at 20:44

## 2024-08-07 RX ADMIN — PRAZOSIN HYDROCHLORIDE 5 MG: 5 CAPSULE ORAL at 20:44

## 2024-08-07 RX ADMIN — INSULIN GLARGINE-YFGN 40 UNITS: 100 INJECTION, SOLUTION SUBCUTANEOUS at 17:39

## 2024-08-07 ASSESSMENT — ENCOUNTER SYMPTOMS
CONSTIPATION: 0
NERVOUS/ANXIOUS: 0
VOMITING: 0
DEPRESSION: 0
DIARRHEA: 0
INSOMNIA: 0
NAUSEA: 0
HALLUCINATIONS: 0

## 2024-08-07 ASSESSMENT — COGNITIVE AND FUNCTIONAL STATUS - GENERAL
TOILETING: A LITTLE
DAILY ACTIVITIY SCORE: 22
HELP NEEDED FOR BATHING: A LITTLE
SUGGESTED CMS G CODE MODIFIER DAILY ACTIVITY: CJ

## 2024-08-07 ASSESSMENT — COPD QUESTIONNAIRES
IN THE PAST 12 MONTHS DO YOU DO LESS THAN YOU USED TO BECAUSE OF YOUR BREATHING PROBLEMS: DISAGREE/UNSURE
DO YOU EVER COUGH UP ANY MUCUS OR PHLEGM?: NO/ONLY WITH OCCASIONAL COLDS OR INFECTIONS
COPD SCREENING SCORE: 0
HAVE YOU SMOKED AT LEAST 100 CIGARETTES IN YOUR ENTIRE LIFE: NO/DON'T KNOW
DURING THE PAST 4 WEEKS HOW MUCH DID YOU FEEL SHORT OF BREATH: NONE/LITTLE OF THE TIME

## 2024-08-07 ASSESSMENT — PAIN DESCRIPTION - PAIN TYPE: TYPE: ACUTE PAIN

## 2024-08-07 ASSESSMENT — ACTIVITIES OF DAILY LIVING (ADL): TOILETING: INDEPENDENT

## 2024-08-07 NOTE — PROGRESS NOTES
"PSYCHIATRIC FOLLOW-UP:(established)  *Reason for admission: 44 year old male presenting with acute-onset right upper and lower extremity weakness, concerning for conversion disorder  *Legal Hold Status: Not applicable  Chart reviewed.         *HPI: Norm is a 44 year old male with a PMH of bipolar disorder, anxiety, depression, BPD, and PTSD presenting with acute onset, right upper and lower extremity weakness. Patient reports he was cleaning with the help of his caretaker, when he had feelings of RUE and RLE tingling which then progressed to weakness. Patient reports one possible trigger is that his apartment is not agreeable to allow his dogs to live with him. Patient reports he has had an episode of weakness like this a few years ago, in which it had a similar onset. He reports the weakness gradually resolved to his baseline with time and physical therapy.   Patient reports not being able to live with his dogs is the main source of anxiety. He reports his overall anxiety level is only a 1-2/10. He reports only sleeping 2-3 hours, as his room mate was talking a lot. He reports no issues with eating, drinking, or with urination/defacation. He reports his mood is \"pretty good.\"  Patient reports tolerating 150 mg sertraline dose well and is agreeable to increasing his dose. He reports taking buspar in the past, in which he felt overall unease at first which eventually regulated and he tolerated it well.   Patient reports his right-sided weakness is about the same since admission.  However, we thoroughly explained that so far there are no abnormalities noted on his brain imaging so without any definitive stroke it is suspected that he should have a more rapid motor recovery.  After this discussion the patient was able to voluntarily press his right foot against my hand.  He states at this point this is all he can do but he is still willing to work with physical therapy to see if he can improve his strength as when a " "similar incident happened several years ago he did find benefit from physical therapy.  We did also discuss the potential contribution of anxiety and depression to his motor symptoms and the patient was more willing to discuss recent stressors and endorsed that he does have a history of severe trauma.  Discussed involvement of psychotherapy with the patient.  He denies SI, HI, AVH, delusions.    Medical ROS (as pertinent):     Review of Systems   Gastrointestinal:  Negative for constipation, diarrhea, nausea and vomiting.   Genitourinary:  Negative for dysuria and hematuria.   Psychiatric/Behavioral:  Negative for depression, hallucinations and suicidal ideas. The patient is not nervous/anxious and does not have insomnia.    Denies chest pain or shortness of breath      *Psychiatric Examination:  Vitals: T 96.9 F BP 90/55 HR 47 RR 18 96% O2  General Appearance: appropriate eye contact, resting in bed, not in acute distress  Abnormal Movements: unable to move right upper and lower extremities at the beginning of the interview but towards the end he was able to press his right foot against my hand.  No tics, tremors or dyskinesias.  No dystonias.  Gait and Posture: not observed.  Speech: mild slurring of words otherwise normal rate, rhythm and volume  Thought processes: Logical, linear and goal-directed  Abnormal or Psychotic Thoughts: not observed, content appropriate to conversation.  No delusions  Judgement and Insight:  limited/limited  Orientation: alert and oriented to person, place, and time  Recent and Remote Memory: Intact recent and remote memory  Attention Span and Concentration: Intact to conversation  Language: spontaneous and comprehends spoken commands  Fund of Knowledge: Appears appropriate for age  Mood and Affect:\"pretty good\"/flat affect  SI/HI: denies      MR-BRAIN-W/O   Final Result      1.  Diffuse extensive leukoencephalopathy. This is unchanged since the previous MRI dated 8/14/2011.   2.  No " "acute abnormality.      DX-CHEST-PORTABLE (1 VIEW)   Final Result      1.  Hypoinflation with elevation of RIGHT hemidiaphragm.   2.  No pneumonia or pulmonary edema.      CT-CTA NECK WITH & W/O-POST PROCESSING   Final Result      1.  CT angiogram of the neck within normal limits.   2.  Redemonstrated heterogeneous midline anterior neck mass, unchanged from previous exam.      CT-CTA HEAD WITH & W/O-POST PROCESS   Final Result      CT angiogram of the [place] within normal limits.      CT-CEREBRAL PERFUSION ANALYSIS   Final Result      1. Cerebral blood flow less than 30% possibly representing completed infarct = 1 mL. Based on distribution of this finding, this is likely to represent artifact.      2. T Max more than 6 seconds possibly representing combination of completed infarct and ischemia = 7 mL. Based on the distribution of this finding, this is likely to represent artifact.      3. Mismatched volume possibly representing ischemic brain/penumbra= 6 mL      4.  Please note that this cerebral perfusion study and report is Quantitative and targets supratentorial (cerebral) perfusion for evaluation of large vessel territory acute ischemia/infarction. For example, lacunar infarcts, and brainstem/posterior fossa    ischemia/infarction are not evaluated on this study.  Data acquisition is subject to artifacts which can yield non-anatomically plausible perfusion maps which may be due to motion, bolus timing, signal to noise ratio, or other technical factors.    Perfusion map abnormalities which show non-anatomic distributions are likely artifact.   This study is not \"stand-alone\" and should only be utilized for diagnosis, management/treatment in correlation with CT, CTA, and/or MRI and clinical factors.         CT-HEAD W/O   Final Result      1.  No acute intracranial abnormality.   2.  Diffuse low density again seen throughout the white matter, similar to prior exam, favoring demyelinating process.          "            *Labs personally reviewed:   Recent Results (from the past 24 hour(s))   POCT glucose device results    Collection Time: 08/06/24  6:11 PM   Result Value Ref Range    POC Glucose, Blood 212 (H) 65 - 99 mg/dL   POCT glucose device results    Collection Time: 08/06/24  8:09 PM   Result Value Ref Range    POC Glucose, Blood 181 (H) 65 - 99 mg/dL   POCT glucose device results    Collection Time: 08/07/24  8:20 AM   Result Value Ref Range    POC Glucose, Blood 154 (H) 65 - 99 mg/dL   POCT glucose device results    Collection Time: 08/07/24 11:40 AM   Result Value Ref Range    POC Glucose, Blood 170 (H) 65 - 99 mg/dL     Lab Results   Component Value Date/Time    SODIUM 135 08/06/2024 12:36 AM    POTASSIUM 3.5 (L) 08/06/2024 12:36 AM    CHLORIDE 101 08/06/2024 12:36 AM    CO2 22 08/06/2024 12:36 AM    GLUCOSE 192 (H) 08/06/2024 12:36 AM    BUN 23 (H) 08/06/2024 12:36 AM    CREATININE 0.68 08/06/2024 12:36 AM    BUNCREATRAT 8.3 (L) 07/20/2023 03:23 PM    GLOMRATE 89 05/18/2023 06:44 PM      Recent Labs     08/05/24  1333   ASTSGOT 11*   ALTSGPT 13   TBILIRUBIN 0.3   GLOBULIN 2.2   INR 0.95     Lab Results   Component Value Date/Time    WBC 8.5 08/05/2024 01:33 PM    RBC 3.85 (L) 08/05/2024 01:33 PM    HEMOGLOBIN 12.0 (L) 08/05/2024 01:33 PM    HEMATOCRIT 34.8 (L) 08/05/2024 01:33 PM    MCV 90.4 08/05/2024 01:33 PM    MCH 31.2 08/05/2024 01:33 PM    MCHC 34.5 08/05/2024 01:33 PM    MPV 10.6 08/05/2024 01:33 PM    NEUTSPOLYS 55.70 08/05/2024 01:33 PM    LYMPHOCYTES 32.30 08/05/2024 01:33 PM    MONOCYTES 7.60 08/05/2024 01:33 PM    EOSINOPHILS 1.50 08/05/2024 01:33 PM    BASOPHILS 1.80 08/05/2024 01:33 PM    ANISOCYTOSIS 2+ (A) 07/01/2021 07:44 PM      EKG  8/5/24  Intervals                                Axis   Rate:       63                           P:          50   AZ:         179                          QRS:        35   QRSD:       119                          T:          46   QT:         391   QTc:         401     Interpretive Statements   Sinus rhythm     Current medications:  Current Facility-Administered Medications   Medication Dose Route Frequency Provider Last Rate Last Admin    HYDROcodone-acetaminophen (Norco) 5-325 MG per tablet 1 Tablet  1 Tablet Oral Q6HRS PRN David Winston M.D.   1 Tablet at 08/07/24 1145    insulin regular (HumuLIN R,NovoLIN R) injection  1-6 Units Subcutaneous 4X/DAY ACHS Oliver Bryant M.D.   1 Units at 08/07/24 1143    And    dextrose 50% (D50W) injection 25 g  25 g Intravenous Q15 MIN PRN Oliver Bryant M.D.        [Held by provider] busPIRone (Buspar) tablet 10 mg  10 mg Oral BID Oliver Bryant M.D.   10 mg at 08/06/24 1838    heparin injection 5,000 Units  5,000 Units Subcutaneous Q8HRS Abigail Dela Cruz A.P.R.N.   5,000 Units at 08/07/24 0556    acetaminophen (Tylenol) tablet 650 mg  650 mg Oral Q6HRS PRN Abigail Dela Cruz A.P.R.N.        aspirin (Asa) chewable tab 81 mg  81 mg Oral DAILY Abigail Dela Cruz A.P.R.N.   81 mg at 08/07/24 0554    Or    aspirin (Asa) suppository 300 mg  300 mg Rectal DAILY Abigail Dela Cruz, A.P.R.N.        albuterol inhaler 2 Puff  2 Puff Inhalation Q4H PRN (RT) Abigail Dela Cruz A.P.R.N.        atorvastatin (Lipitor) tablet 40 mg  40 mg Oral Nightly Abigail Dela Cruz A.P.R.N.   40 mg at 08/06/24 2006    fenofibrate micronized (Lofibra) capsule 201 mg  201 mg Oral DAILY Abigail Dela Cruz A.P.R.N.   201 mg at 08/07/24 0553    levothyroxine (Synthroid) tablet 200 mcg  200 mcg Oral AM ES Abigail Dela Cruz A.P.R.N.   200 mcg at 08/07/24 0557    levothyroxine (Synthroid) tablet 25 mcg  25 mcg Oral AM ES Abigail Dela Cruz A.P.R.N.   25 mcg at 08/07/24 0557    loratadine (Claritin) tablet 10 mg  10 mg Oral DAILY WILMER Drummond.P.R.N.   10 mg at 08/07/24 0554    lurasidone (Latuda) tablet 80 mg  80 mg Oral PM MEAL SATHYA DrummondP.R.N.   80 mg at 08/06/24 1838    metFORMIN (Glucophage) tablet 2,000 mg  2,000 mg Oral DAILY WILMER Drummond.P.R.N.    2,000 mg at 08/07/24 0553    montelukast (Singulair) tablet 10 mg  10 mg Oral DAILY Abigail Dela Cruz, A.P.R.N.   10 mg at 08/06/24 2005    sertraline (Zoloft) tablet 150 mg  150 mg Oral DAILY Abigail Smitht, A.P.R.N.   150 mg at 08/07/24 0554    prazosin (Minipress) capsule 5 mg  5 mg Oral Nightly Abigail Smitht, A.P.R.N.   5 mg at 08/06/24 2006    vitamin D3 (Cholecalciferol) tablet 1,000 Units  1,000 Units Oral QHS Abigail PRIMO. Jose De Jesus, A.P.R.N.   1,000 Units at 08/06/24 2006    traZODone (Desyrel) tablet 50 mg  50 mg Oral QHS PRN Abigail Dela Cruz, A.P.R.N.        insulin GLARGINE (Lantus,Semglee) injection  40 Units Subcutaneous Q EVENING Abigail Dela Cruz, A.P.R.N.   40 Units at 08/06/24 1838       Assessment: Norm is a 44 year old male with a PMH of ?bipolar disorder, anxiety, depression, BPD, and PTSD presenting with acute-onset, right sided upper and lower extremity weakness that started when he was cleaning with his caretaker. He reports this has happened before, and it resolved with time and physical therapy treatment. Neurological workup and CT imaging do not show acute intracranial abnormalities, and there was concern for conversion disorder. Patient reports the right-sided weakness is about the same as when he was admitted. He reports recent stressful triggers include not being able to live with his dogs in his apartment and wanting to get them trained as emotional support animals.   Patient is agreeable to increasing his sertraline dose to 200 mg and continuing with physical therapy as it has provided relief for his symptoms in the past.   Risk factors include reported DAVEY and limited support in the area.  Protective factors include treatment adherence and positive outlook.   Patient denies SI/HI/AVH/delusions.      Dx:  Alcohol use disorder  Unspecified mood disorder  -Of note, patient is denying any history of manic symptoms.  Mood appears primarily to be depressed.  Anxiety disorder,  generalized  PTSD  Intellectual delay      Medical :  Weakness  2. Type 2 diabetes mellitus  3. Hypothyroidism        Plan:  1- Legal hold: not applicable  No sitter needed  Visitors: Yes   Personal belongings: Yes    Medications and treatment  Planned to increase sertraline dose to 200 mg daily for treatment of depression and anxiety and stop BuSpar., however as patient has seizure disorder that is currently not treated will hold off on increase until EEG is obtained tomorrow (spoke with IM attending about this, patient has a hx of abnormal EEGs and was on AEDs that have since been removed) as antidepressants can lower seizure threshold.  Patient reports tolerating medication well  Risks/benefits/side effects discussed, patient verbalized understanding  2.  Continue Latuda 80 mg daily for adjunct of treatment of depression and mood stabilization  3.  Continue prazosin 5 mg at bedtime for insomnia and PTSD   4. Continue physical therapy as recommended  A. Keep walker at bedside to encourage movement  B. Maintain support and validation for symptoms and encourage treatment adherence  C.  Consider encouraging the patient to use the walker to get up to chair or to use the bathroom , per review of PT evaluation on 8/6 it appears that the patient may use a four-wheel walker with minimal assist    5.  Inpatient psychotherapy consult placed    Thank you for the consult.

## 2024-08-07 NOTE — CARE PLAN
The patient is Stable - Low risk of patient condition declining or worsening    Shift Goals  Clinical Goals: safety, neuro chekcs, pain < 5 end of shift  Patient Goals: rest and pain mgt  Family Goals: kristen    Axo4. Able to make needs known. Seen laying on bed supine position. Pt able to turn by self. Due medications given as ordered. Hourly rounds done. Bed lowest and locked position. Bed alarm on. Remains free from injury or falls. Call light and personal belongings within reach. Needs met at this time.      Progress made toward(s) clinical / shift goals:      Problem: Optimal Care of the Stroke Patient  Goal: Optimal acute care for the stroke patient  Description: Target End Date:  1 to 3 days    - Vital signs and neuro checks performed and documented per order  - NIHSS completed and documented per order  - Continuous telemetry monitoring for 72 hours or until discontinued by provider  - Head CT without contrast obtained  - Consideration of MRI/MRA  - MRI screening form completed in worklist if MRI ordered  - Echocardiogram with Bubble Study ordered/completed with consideration of JOHANNY  - Carotid Doppler ordered/completed (Not required if CTA of neck completed in ED)  - Lipid Panel obtained within 48 hours of admission  - PT, PTT, INR obtained per Anticoagulation orders (if applicable)  - Antithrombotic therapy by end of hospital day 2 for ischemic stroke. Provider must document reason if contraindicated.  - Venous Thromboembolism (VTE) Prophylaxis by end of hospital day 2 for ischemic and hemorrhagic stroke. Provider must document reason if contraindicated  - Dysphagia screen completed and documented prior to any PO intake. Patient to remain NPO until Speech Therapy evaluation if thrombolytic or thrombectomy performed  - Rehabilitation assessment including PT/OT/SLP evaluations for referral to Physical Medicine and Rehabilitation services. If none needed, provider needs to document reason  - Neurology consult  placed  - Consideration of cardiology consult for cryptogenic strokes  Outcome: Progressing     Problem: Knowledge Deficit - Stroke Education  Goal: Patient's knowledge of stroke and risk factors will improve  Description: Target End Date:  1-3 days or as soon as patient condition allows    Document in Patient Education    1.  Stroke education booklet provided  2.  Education regarding EMS activation, need for follow up, medication prescribed at discharge, risk factors for stroke/lifestyle modifications, warning signs and symptoms of stroke provided  Outcome: Progressing     Problem: Discharge Planning - Stroke  Goal: Ensure Stroke Core Measures are met prior to discharge  Description: Target End Date:  Prior to discharge or change in level of care    1. Patient discharged on antithrombotic therapy (Ischemic Stroke)  2. Patient discharged on intensive statin if LDL is greater than or equal to 70 mg/dl (Ischemic Stroke)  3. Patient discharged on anticoagulation therapy for patients with atrial fibrillation/flutter (Ischemic Stroke)  4. Smoking education/cessation provided if applicable  Outcome: Progressing     Problem: Neuro Status  Goal: Neuro status will remain stable or improve  Description: Target End Date:  Prior to discharge or change in level of care    Document on Neuro assessment in the Assessment flowsheet    1.  Assess and monitor neurologic status per provider order/protocol/unit policy  2.  Assess level of consciousness and orientation  3.  Assess for speech, dysarthria, dysphagia, facial symmetry  4.  Assess visual field, eye movements, gaze preference, pupil reaction and size  5.  Assess muscle strength and motor response in all four extremities  6.  Assess for sensation (numbness and tingling)  7.  Assess basic neuro reflexes (cough, gag, corneal)  8.  Identify changes in neuro status and report to provider for testing/treatment orders  Outcome: Progressing  Flowsheets (Taken 8/7/2024 1777)  Level of  Consciousness: Alert  Note: Pt remains axo4.     Problem: Fall Risk  Goal: Patient will remain free from falls  Description: Target End Date:  Prior to discharge or change in level of care    Document interventions on the Mariluz Ball Fall Risk Assessment    1.  Assess for fall risk factors  2.  Implement fall precautions  Outcome: Progressing

## 2024-08-07 NOTE — THERAPY
Speech Language Therapy Contact Note    Patient Name: Norm Prabhakar  Age:  44 y.o., Sex:  male  Medical Record #: [id number]  Today's Date: 8/7/2024 08/07/24 0731   Treatment Variance   Reason For Missed Therapy Non-Medical - Other (Please Comment)   Initial Contact Note    Initial Contact Note  Order Received and Verified, Speech Therapy Evaluation in Progress with Full Report to Follow.   Interdisciplinary Plan of Care Collaboration   IDT Collaboration with  Physician   Collaboration Comments Order received for a cognitive evaluation. However, no acute neurological changes per MRI Brain and CT-Head results. Discussed with MD who expressed okay to cancel order as a cognitive evaluation is not warranted. SLP to sign off; please re-consult with any changes.

## 2024-08-07 NOTE — DISCHARGE PLANNING
Case Management Discharge Planning    Admission Date: 8/5/2024  GMLOS:    ALOS: 0    6-Clicks ADL Score:    6-Clicks Mobility Score: 15  PT and/or OT Eval ordered: Yes  Post-acute Referrals Ordered: Yes  Post-acute Choice Obtained: Yes  Has referral(s) been sent to post-acute provider:  Yes      Anticipated Discharge Dispo: Discharge Disposition: Discharged to home/self care (01)    DME Needed: Yes    DME Ordered: Yes    Action(s) Taken: Referral(s) sent    This RN CM completed chart review and pt is anticipated to need SNF. Pt is being declined related to care exceeds capacity. This RN CM asked physician for order for PMR.    Discussed plan for Rehab vs home with HH. Pt does not have local family to assist with care at home. Pt has been working with a  with [employer] named Chloe that he has been working with.     This RN CM called wilver Paz and received contact number for Chloe with [employer], this RN CM spoke with mom that Chloe is Norm's caregiver.     This RN CM called Chloe with [employer] and she is not a caregiver she is a  thru the [facility] to help people with independent living.     Chloe checks in with pt every day Monday-Friday her check in length of time is variable depending on what they need to do with him that day.     Escalations Completed: Pending Discharge Destination    Medically Clear: Yes    Next Steps: This RN CM to assist with barriers to discharge.    Barriers to Discharge: Pending Placement    Is the patient up for discharge tomorrow: No

## 2024-08-07 NOTE — CARE PLAN
The patient is Stable - Low risk of patient condition declining or worsening    Shift Goals  Clinical Goals: Patient safety/ Bed extension/Q4 neuro checks  Patient Goals: Rest  Family Goals: n/a    Progress made toward(s) clinical / shift goals:  Patient denies any pain,on low air loss bed , bed extender attached to bed,able to rest comfortably, call light within reach.    Patient is not progressing towards the following goals:

## 2024-08-07 NOTE — CARE PLAN
The patient is Stable - Low risk of patient condition declining or worsening    Shift Goals  Clinical Goals: tele monitoring, speech therapy, Q4 neuro checks, glucose monitoring,psych consult, medication administration  Patient Goals: rest, comfort  Family Goals: n/a    Progress made toward(s) clinical / shift goals:      Patient is not progressing towards the following goals:

## 2024-08-07 NOTE — PROGRESS NOTES
4 Eyes Skin Assessment Completed by [facility] Peralta and Alberta RN.    Head WDL  Ears Redness and Blanching  Nose Redness and Blanching  Mouth WDL  Neck WDL  Breast/Chest WDL  Shoulder Blades WDL  Spine WDL  (R) Arm/Elbow/Hand Redness, Blanching, and Scab  (L) Arm/Elbow/Hand Redness, Blanching, and Scab  Abdomen WDL  Groin WDL  Scrotum/Coccyx/Buttocks Redness and Blanching  (R) Leg Scar, Scab, and Bruising  (L) Leg Scar, Scab, and Bruising  (R) Heel/Foot/Toe Discoloration, Scar, and Scab  (L) Heel/Foot/Toe Discoloration, Scar, and Scab          Devices In Places PIV      Interventions In Place Gray Ear Foams    Possible Skin Injury Yes    Pictures Uploaded Into Epic N/A  Wound Consult Placed N/A  RN Wound Prevention Protocol Ordered Yes

## 2024-08-08 LAB
ANION GAP SERPL CALC-SCNC: 14 MMOL/L (ref 7–16)
BUN SERPL-MCNC: 17 MG/DL (ref 8–22)
CALCIUM SERPL-MCNC: 8.7 MG/DL (ref 8.5–10.5)
CHLORIDE SERPL-SCNC: 100 MMOL/L (ref 96–112)
CO2 SERPL-SCNC: 23 MMOL/L (ref 20–33)
CREAT SERPL-MCNC: 0.8 MG/DL (ref 0.5–1.4)
GFR SERPLBLD CREATININE-BSD FMLA CKD-EPI: 113 ML/MIN/1.73 M 2
GLUCOSE BLD STRIP.AUTO-MCNC: 142 MG/DL (ref 65–99)
GLUCOSE BLD STRIP.AUTO-MCNC: 168 MG/DL (ref 65–99)
GLUCOSE BLD STRIP.AUTO-MCNC: 195 MG/DL (ref 65–99)
GLUCOSE BLD STRIP.AUTO-MCNC: 204 MG/DL (ref 65–99)
GLUCOSE SERPL-MCNC: 152 MG/DL (ref 65–99)
MAGNESIUM SERPL-MCNC: 2 MG/DL (ref 1.5–2.5)
PHOSPHATE SERPL-MCNC: 3.3 MG/DL (ref 2.5–4.5)
POTASSIUM SERPL-SCNC: 3.9 MMOL/L (ref 3.6–5.5)
SODIUM SERPL-SCNC: 137 MMOL/L (ref 135–145)

## 2024-08-08 PROCEDURE — A9270 NON-COVERED ITEM OR SERVICE: HCPCS | Mod: UD | Performed by: NURSE PRACTITIONER

## 2024-08-08 PROCEDURE — 700102 HCHG RX REV CODE 250 W/ 637 OVERRIDE(OP): Mod: UD | Performed by: NURSE PRACTITIONER

## 2024-08-08 PROCEDURE — 99233 SBSQ HOSP IP/OBS HIGH 50: CPT | Performed by: STUDENT IN AN ORGANIZED HEALTH CARE EDUCATION/TRAINING PROGRAM

## 2024-08-08 PROCEDURE — 84100 ASSAY OF PHOSPHORUS: CPT

## 2024-08-08 PROCEDURE — 96372 THER/PROPH/DIAG INJ SC/IM: CPT

## 2024-08-08 PROCEDURE — 36415 COLL VENOUS BLD VENIPUNCTURE: CPT

## 2024-08-08 PROCEDURE — 83735 ASSAY OF MAGNESIUM: CPT

## 2024-08-08 PROCEDURE — 700111 HCHG RX REV CODE 636 W/ 250 OVERRIDE (IP): Mod: UD | Performed by: NURSE PRACTITIONER

## 2024-08-08 PROCEDURE — G0378 HOSPITAL OBSERVATION PER HR: HCPCS

## 2024-08-08 PROCEDURE — 82962 GLUCOSE BLOOD TEST: CPT | Mod: 91

## 2024-08-08 PROCEDURE — 80048 BASIC METABOLIC PNL TOTAL CA: CPT

## 2024-08-08 RX ADMIN — LEVOTHYROXINE SODIUM 200 MCG: 0.2 TABLET ORAL at 05:14

## 2024-08-08 RX ADMIN — MONTELUKAST 10 MG: 10 TABLET, FILM COATED ORAL at 20:32

## 2024-08-08 RX ADMIN — INSULIN HUMAN 2 UNITS: 100 INJECTION, SOLUTION PARENTERAL at 13:25

## 2024-08-08 RX ADMIN — PRAZOSIN HYDROCHLORIDE 5 MG: 5 CAPSULE ORAL at 20:43

## 2024-08-08 RX ADMIN — HEPARIN SODIUM 5000 UNITS: 5000 INJECTION, SOLUTION INTRAVENOUS; SUBCUTANEOUS at 05:15

## 2024-08-08 RX ADMIN — LEVOTHYROXINE SODIUM 25 MCG: 0.03 TABLET ORAL at 05:15

## 2024-08-08 RX ADMIN — LURASIDONE HYDROCHLORIDE 80 MG: 40 TABLET, FILM COATED ORAL at 18:13

## 2024-08-08 RX ADMIN — INSULIN GLARGINE-YFGN 40 UNITS: 100 INJECTION, SOLUTION SUBCUTANEOUS at 18:12

## 2024-08-08 RX ADMIN — INSULIN HUMAN 1 UNITS: 100 INJECTION, SOLUTION PARENTERAL at 18:12

## 2024-08-08 RX ADMIN — HEPARIN SODIUM 5000 UNITS: 5000 INJECTION, SOLUTION INTRAVENOUS; SUBCUTANEOUS at 13:27

## 2024-08-08 RX ADMIN — SERTRALINE HYDROCHLORIDE 150 MG: 50 TABLET ORAL at 05:14

## 2024-08-08 RX ADMIN — LORATADINE 10 MG: 10 TABLET ORAL at 05:14

## 2024-08-08 RX ADMIN — ATORVASTATIN CALCIUM 40 MG: 40 TABLET, FILM COATED ORAL at 20:32

## 2024-08-08 RX ADMIN — HEPARIN SODIUM 5000 UNITS: 5000 INJECTION, SOLUTION INTRAVENOUS; SUBCUTANEOUS at 20:32

## 2024-08-08 RX ADMIN — FENOFIBRATE 201 MG: 134 CAPSULE ORAL at 05:13

## 2024-08-08 RX ADMIN — ASPIRIN 81 MG: 81 TABLET, CHEWABLE ORAL at 05:14

## 2024-08-08 RX ADMIN — Medication 1000 UNITS: at 20:32

## 2024-08-08 RX ADMIN — METFORMIN HYDROCHLORIDE 2000 MG: 500 TABLET ORAL at 05:13

## 2024-08-08 RX ADMIN — INSULIN HUMAN 1 UNITS: 100 INJECTION, SOLUTION PARENTERAL at 20:38

## 2024-08-08 ASSESSMENT — ENCOUNTER SYMPTOMS
FOCAL WEAKNESS: 1
CARDIOVASCULAR NEGATIVE: 1
GASTROINTESTINAL NEGATIVE: 1
MUSCULOSKELETAL NEGATIVE: 1
FEVER: 0
PSYCHIATRIC NEGATIVE: 1

## 2024-08-08 ASSESSMENT — PAIN DESCRIPTION - PAIN TYPE: TYPE: ACUTE PAIN

## 2024-08-08 NOTE — PROGRESS NOTES
[facility] Daily Progress Note    Date of Service  8/7/2024    Chief Complaint  Norm Prabhakar is a 44 y.o. male admitted 8/5/2024 with right side weakness    Hospital Course  Norm Prabhakar is a 44 y.o. male with pmh of uncontrolled diabetes type 2, hypothyroidism who presented to the ED on 8/5/2024 with complaints of right-sided weakness. Patient reports a history of right-sided weakness due to previous stroke, but says that he has been unable to move his right arm today. He says he was cleaning his apartment today when his symptoms occurred. He denies any trauma to this arm, denies any changes to other extremities, denies changes in speech, swallowing. He denies dizziness, lightheadedness, shortness of breath, chest pain or palpitations. Denies recent cold or flulike symptoms. He says he has not taken any of his medications, including his diabetic medication the last couple days, as he was visiting with a friend and drinking some wine and did not want to mix alcohol with his medications.     ERP discussed case with neurologist, Dr. Garcia, who recommends no further stroke workup, as this patient has had multiple workup in the past and is well known to neurology service and has never had findings of acute stroke. Patient apparently has had at least 20 or so CTA's of head and neck and MRI brain in the past eight years at multiple institutions, per Dr. Garcia.   A nurse evaluation, patient was using his phone in his right hand, and using his right arm. However, he put this down on her entry to the room, and said he was unable to move his arm.      CBC significant for hemoglobin 12, but otherwise unremarkable. CMP shows sodium 132, glucose 351, BUN 26, otherwise unremarkable. A1c 10.3, troponin 24. EKG with no acute ST-T changes. CT head, CTA head and neck, show no acute findings under similar to prior. Chest x-ray with no acute findings.EKG with no acute STD changes.    Interval Problem Update  The patient states  that is unable to move his right side of his body.  This complaint of right-sided weakness is inconsistent when he complains about it as well as on exam.  MRI of the brain unremarkable for acute CVA.  Psychiatry consulted for possible conversion disorder.  Neurology has signed off.    8/7  Afebrile, HR 47-79, RR 16-18, SpO2 95-97% on room air.  No acute complaints to me, no reports of pain to me, feeling a bit weak.  Discussed need for possible rehab and he is agreeable for continued PT/OT work. Discussed with Dr. Jung, she notes he previously was on multiple AED's, not on any now, will discuss further with him regards to reason they were removed. Will obtain spot EEG in the AM to as prior EEG's had some high risk features and it is reasonable to rule out seizures causing any of his symptoms. Waiting for SNF, continue PT/OT. Psychiatry following, increased sertraline, stopped buspar, psychotherapy consult placed. Continuing latuda, prazosin.     I have discussed this patient's plan of care and discharge plan at IDT rounds today with Case Management, Nursing, Nursing leadership, and other members of the IDT team.    Consultants/Specialty  psychiatry    Code Status  Full Code    Disposition  Medically Cleared  I have placed the appropriate orders for post-discharge needs.    Review of Systems  Review of Systems   Constitutional:  Negative for fever and malaise/fatigue.   Cardiovascular: Negative.    Gastrointestinal: Negative.    Genitourinary: Negative.    Musculoskeletal: Negative.    Neurological:  Positive for focal weakness.   Psychiatric/Behavioral: Negative.          Physical Exam  Temp:  [36.1 °C (96.9 °F)-36.4 °C (97.5 °F)] 36.2 °C (97.2 °F)  Pulse:  [47-79] 79  Resp:  [16-18] 16  BP: ()/(55-72) 92/63  SpO2:  [95 %-97 %] 97 %    Physical Exam  Constitutional:       General: He is not in acute distress.     Appearance: He is not ill-appearing or toxic-appearing.   HENT:      Head:  Normocephalic.      Nose: Nose normal. No rhinorrhea.      Mouth/Throat:      Mouth: Mucous membranes are moist.      Comments: Poor dentition  Eyes:      Extraocular Movements: Extraocular movements intact.      Conjunctiva/sclera: Conjunctivae normal.   Cardiovascular:      Rate and Rhythm: Normal rate and regular rhythm.      Heart sounds: No murmur heard.     No gallop.   Pulmonary:      Effort: Pulmonary effort is normal. No respiratory distress.      Breath sounds: No stridor. No wheezing.   Abdominal:      General: There is no distension.      Tenderness: There is no abdominal tenderness. There is no guarding.   Musculoskeletal:         General: No swelling.      Cervical back: Normal range of motion and neck supple.      Right lower leg: No edema.      Left lower leg: No edema.   Skin:     General: Skin is warm and dry.      Coloration: Skin is not jaundiced.   Neurological:      Mental Status: He is oriented to person, place, and time.      Cranial Nerves: No cranial nerve deficit.      Motor: Weakness (right side) present.   Psychiatric:      Comments: Odd affect         Fluids    Intake/Output Summary (Last 24 hours) at 8/8/2024 0622  Last data filed at 8/7/2024 1918  Gross per 24 hour   Intake 960 ml   Output --   Net 960 ml       Laboratory  Recent Labs     08/05/24  1333   WBC 8.5   RBC 3.85*   HEMOGLOBIN 12.0*   HEMATOCRIT 34.8*   MCV 90.4   MCH 31.2   MCHC 34.5   RDW 44.1   PLATELETCT 227   MPV 10.6     Recent Labs     08/05/24  1333 08/06/24  0036   SODIUM 132* 135   POTASSIUM 4.7 3.5*   CHLORIDE 98 101   CO2 20 22   GLUCOSE 351* 192*   BUN 26* 23*   CREATININE 0.77 0.68   CALCIUM 9.0 8.2*     Recent Labs     08/05/24  1333   APTT 27.6   INR 0.95         Recent Labs     08/06/24  0036   TRIGLYCERIDE 881*   HDL 31*   LDL see below       Imaging  MR-BRAIN-W/O   Final Result      1.  Diffuse extensive leukoencephalopathy. This is unchanged since the previous MRI dated 8/14/2011.   2.  No acute  "abnormality.      DX-CHEST-PORTABLE (1 VIEW)   Final Result      1.  Hypoinflation with elevation of RIGHT hemidiaphragm.   2.  No pneumonia or pulmonary edema.      CT-CTA NECK WITH & W/O-POST PROCESSING   Final Result      1.  CT angiogram of the neck within normal limits.   2.  Redemonstrated heterogeneous midline anterior neck mass, unchanged from previous exam.      CT-CTA HEAD WITH & W/O-POST PROCESS   Final Result      CT angiogram of the [place] within normal limits.      CT-CEREBRAL PERFUSION ANALYSIS   Final Result      1. Cerebral blood flow less than 30% possibly representing completed infarct = 1 mL. Based on distribution of this finding, this is likely to represent artifact.      2. T Max more than 6 seconds possibly representing combination of completed infarct and ischemia = 7 mL. Based on the distribution of this finding, this is likely to represent artifact.      3. Mismatched volume possibly representing ischemic brain/penumbra= 6 mL      4.  Please note that this cerebral perfusion study and report is Quantitative and targets supratentorial (cerebral) perfusion for evaluation of large vessel territory acute ischemia/infarction. For example, lacunar infarcts, and brainstem/posterior fossa    ischemia/infarction are not evaluated on this study.  Data acquisition is subject to artifacts which can yield non-anatomically plausible perfusion maps which may be due to motion, bolus timing, signal to noise ratio, or other technical factors.    Perfusion map abnormalities which show non-anatomic distributions are likely artifact.   This study is not \"stand-alone\" and should only be utilized for diagnosis, management/treatment in correlation with CT, CTA, and/or MRI and clinical factors.         CT-HEAD W/O   Final Result      1.  No acute intracranial abnormality.   2.  Diffuse low density again seen throughout the white matter, similar to prior exam, favoring demyelinating process.                "     Assessment/Plan  * Right sided weakness- (present on admission)  Assessment & Plan  Per Dr. Delong, neurology, no further stroke workup necessary. Recommends psychiatric evaluation due to likely conversion disorder  -psychiatric consult placed    - Will continue q 4 hr eval  - PT/OT    Uncontrolled type 2 diabetes mellitus with hyperglycemia (HCC)- (present on admission)  Assessment & Plan  A1c>10.    - accucheck ac/hs with low dose ssi coverage  - Continue lantus 40 units HS  - Continue metformin  - DM diet    Hypothyroidism- (present on admission)  Assessment & Plan  - continue levothyroxine    Other hyperlipidemia- (present on admission)  Assessment & Plan  - continue statin, fenofibrate    Anxiety and depression- (present on admission)  Assessment & Plan  - resume zoloft and trazodone    Asthma- (present on admission)  Assessment & Plan  - Albuterol PRN         VTE prophylaxis:    heparin ppx      I have performed a physical exam and reviewed and updated ROS and Plan today (8/7/2024). In review of yesterday's note (8/6/2024), there are no changes except as documented above.    Greater than 53  minutes spent prepping to see patient (e.g. review of tests) obtaining and/or reviewing separately obtained history. Performing a medically appropriate examination and/ evaluation.  Counseling and educating the patient/family/caregiver.  Ordering medications, tests, or procedures.  Referring and communicating with other health care professionals.  Documenting clinical information in EPIC.  Independently interpreting results and communicating results to patient/family/caregiver.  Care coordination.

## 2024-08-08 NOTE — THERAPY
Occupational Therapy   Initial Evaluation     Patient Name: Norm Prabhakar  Age:  44 y.o., Sex:  male  Medical Record #: [id number]  Today's Date: 8/7/2024     Precautions: Fall Risk    Assessment    Patient is 44 y.o. male admitted with acute-onset R UE and LE weakness, imaging negative, chart notes and consulting specialists indicate likely conversion disorder. Pmhx includes extensive mental health history of bipolar disorder, anxiety, depression, BPD, and PTSD, in addition to hypothyroid and uncontrolled T2DM. Pt demonstrates ability to exit bed to the right at SPV level, no balance impairments in sitting, and changed his socks using bilateral coordination with extra time and effort. Functional RUE strength to include shoulder flexion, wrist flexion and extension, and finger extension observed during bilateral sock doff/don task. Pt also reached for FWW with RUE, maintained his grasp, and was able to slowly weight shift and advance side steps with his RLE to side-step up his bed prior to returning to supine without assist. Pt calm and chatting throughout, talking about his dogs and his family who live in [facility]. True unilateral deficits not appreciated. OT assessment aligns with other members of the interdisciplinary team who indicate pt's symptoms do not stem from a neurological origin. Will defer to Psychiatry and Social Work/Case Management for DC recommendations to address and support pt's needs for volition-related assistance upon DC.     Plan    Occupational Therapy Initial Treatment Plan   Duration: Discharge Needs Only    DC Equipment Recommendations: Tub / Shower Seat  Discharge Recommendations: Other - behavioral health placement vs group home? decreased mobility and ADL participation appears volitional as motor assessment and functional task completion assessment are inconsistent.     Objective     08/07/24 4505   Prior Living Situation   Prior Services None   Housing / Facility 1 Story  "Apartment / Condo   Steps Into Home 0   Steps In Home 0   Bathroom Set up Walk In Shower   Equipment Owned Single Point Cane   Lives with - Patient's Self Care Capacity Alone and Able to Care For Self   Comments pt reports living in a studio in [place], he states he assists with the security for the apartment complex by monitoring sercuity cameras   Prior Level of ADL Function   Self Feeding Independent   Grooming / Hygiene Independent   Bathing Independent   Dressing Independent   Toileting Independent   Comments pt lives alone, likely low standard of self-care hygiene at baseline as indicated by very dirty feet and hair   Precautions   Precautions Fall Risk   Pain 0 - 10 Group   Therapist Pain Assessment Nurse Notified;0;Post Activity Pain Same as Prior to Activity  (no c/o pain)   Cognition    Cognition / Consciousness X   Speech/ Communication Dysarthric   Level of Consciousness Alert   Attention Impaired   Initiation Impaired   Comments slow processing, slow to initiate and carryout tasks   Active ROM Upper Body   Comments formal AROM not done 2/2 pt subjective \"I can't move my right arm\" but observed moving RUE against gravity during functional tasks   Strength Upper Body   Comments observed functional strength of RUE during ADL assessment: R finger/wrist strength while manipulating/donning socks, R shoulder extension to reach for feet and FWW, and R biceps flexion to place hand on FWW.   Upper Body Muscle Tone   Upper Body Muscle Tone  WDL   Coordination Upper Body   Comments decreased use of R hand and UE appears volitional as grasp on FWW intact and extension/strength of R digits intact while donning socks   Balance Assessment   Sitting Balance (Static) Good   Sitting Balance (Dynamic) Good   Standing Balance (Static) Fair +   Standing Balance (Dynamic) Fair   Weight Shift Sitting Good   Weight Shift Standing Fair   Comments FWW   Bed Mobility    Supine to Sit Supervised   Sit to Supine Supervised   Rolling " Supervised   Comments rolled onto R side and pushed self up to sitting without physical assist   ADL Assessment   Eating Supervision   Grooming Supervision;Seated   Lower Body Dressing Supervision   How much help from another person does the patient currently need...   Putting on and taking off regular lower body clothing? 4   Bathing (including washing, rinsing, and drying)? 3   Toileting, which includes using a toilet, bedpan, or urinal? 3   Putting on and taking off regular upper body clothing? 4   Taking care of personal grooming such as brushing teeth? 4   Eating meals? 4   6 Clicks Daily Activity Score 22   Functional Mobility   Sit to Stand Contact Guard Assist   Bed, Chair, Wheelchair Transfer Contact Guard Assist  (side steps)   Transfer Method   (side steps)   Mobility FWW   Comments removed FWW from pt's RUE grasp and pt placed RUE in lap slowly with eccentric control   Activity Tolerance   Sitting Edge of Bed 25min   Standing 2min   Education Group   Education Provided Role of Occupational Therapist;Activities of Daily Living   Role of Occupational Therapist Patient Response Patient;Acceptance;Explanation;Verbal Demonstration   ADL Patient Response Patient;Acceptance;Explanation;Verbal Demonstration   Occupational Therapy Initial Treatment Plan    Duration Discharge Needs Only   Anticipated Discharge Equipment and Recommendations   DC Equipment Recommendations Tub / Shower Seat   Discharge Recommendations Other -  (behavioral health placement or group home? decreased mobility and ADL participation appears volitional as motor assessment and functional task completion assessment are inconsistent.)

## 2024-08-08 NOTE — CARE PLAN
Problem: Neuro Status  Goal: Neuro status will remain stable or improve  Outcome: Progressing  Note: Patient is able to move all 4 extremities at times, but struggles to move right upper extremity most when asked to do so. Jami is seen using RUE to move phone and other belongings when he is not being asked to perform activities     Problem: Bowel Elimination  Goal: Establish and maintain regular bowel function  Outcome: Progressing  Note: Patient had a very larger BM this shift   The patient is Stable - Low risk of patient condition declining or worsening    Shift Goals  Clinical Goals: Patuient safety/monitor BS/neuro check  Patient Goals: Rest/sleep  Family Goals: JUANCARLOS    Progress made toward(s) clinical / shift goals:  progressing    Patient is not progressing towards the following goals:

## 2024-08-08 NOTE — CARE PLAN
The patient is Stable - Low risk of patient condition declining or worsening    Shift Goals  Clinical Goals: Patuient safety/monitor BS/neuro check  Patient Goals: Rest/sleep  Family Goals: JUANCARLOS    Progress made toward(s) clinical / shift goals:  Patient denies any pain,on low air loss bed , bed extender attached to bed,able to rest comfortably, call light within reach.     Patient is not progressing towards the following goals:

## 2024-08-09 LAB
GLUCOSE BLD STRIP.AUTO-MCNC: 125 MG/DL (ref 65–99)
GLUCOSE BLD STRIP.AUTO-MCNC: 155 MG/DL (ref 65–99)
GLUCOSE BLD STRIP.AUTO-MCNC: 215 MG/DL (ref 65–99)
GLUCOSE BLD STRIP.AUTO-MCNC: 226 MG/DL (ref 65–99)

## 2024-08-09 PROCEDURE — G0378 HOSPITAL OBSERVATION PER HR: HCPCS

## 2024-08-09 PROCEDURE — 95816 EEG AWAKE AND DROWSY: CPT | Mod: 26 | Performed by: PSYCHIATRY & NEUROLOGY

## 2024-08-09 PROCEDURE — 700102 HCHG RX REV CODE 250 W/ 637 OVERRIDE(OP): Mod: UD | Performed by: NURSE PRACTITIONER

## 2024-08-09 PROCEDURE — 700111 HCHG RX REV CODE 636 W/ 250 OVERRIDE (IP): Mod: UD | Performed by: NURSE PRACTITIONER

## 2024-08-09 PROCEDURE — 96372 THER/PROPH/DIAG INJ SC/IM: CPT

## 2024-08-09 PROCEDURE — 95816 EEG AWAKE AND DROWSY: CPT | Performed by: PSYCHIATRY & NEUROLOGY

## 2024-08-09 PROCEDURE — 90832 PSYTX W PT 30 MINUTES: CPT | Performed by: SOCIAL WORKER

## 2024-08-09 PROCEDURE — 82962 GLUCOSE BLOOD TEST: CPT | Mod: 91

## 2024-08-09 PROCEDURE — 99233 SBSQ HOSP IP/OBS HIGH 50: CPT | Performed by: STUDENT IN AN ORGANIZED HEALTH CARE EDUCATION/TRAINING PROGRAM

## 2024-08-09 PROCEDURE — A9270 NON-COVERED ITEM OR SERVICE: HCPCS | Mod: UD | Performed by: NURSE PRACTITIONER

## 2024-08-09 RX ORDER — ENOXAPARIN SODIUM 100 MG/ML
40 INJECTION SUBCUTANEOUS DAILY
Status: DISCONTINUED | OUTPATIENT
Start: 2024-08-09 | End: 2024-08-10 | Stop reason: HOSPADM

## 2024-08-09 RX ADMIN — LORATADINE 10 MG: 10 TABLET ORAL at 05:07

## 2024-08-09 RX ADMIN — Medication 1000 UNITS: at 22:11

## 2024-08-09 RX ADMIN — ASPIRIN 81 MG: 81 TABLET, CHEWABLE ORAL at 05:08

## 2024-08-09 RX ADMIN — FENOFIBRATE 201 MG: 134 CAPSULE ORAL at 05:07

## 2024-08-09 RX ADMIN — LEVOTHYROXINE SODIUM 25 MCG: 0.03 TABLET ORAL at 05:08

## 2024-08-09 RX ADMIN — HEPARIN SODIUM 5000 UNITS: 5000 INJECTION, SOLUTION INTRAVENOUS; SUBCUTANEOUS at 05:08

## 2024-08-09 RX ADMIN — INSULIN GLARGINE-YFGN 40 UNITS: 100 INJECTION, SOLUTION SUBCUTANEOUS at 17:21

## 2024-08-09 RX ADMIN — LEVOTHYROXINE SODIUM 200 MCG: 0.2 TABLET ORAL at 05:07

## 2024-08-09 RX ADMIN — INSULIN HUMAN 2 UNITS: 100 INJECTION, SOLUTION PARENTERAL at 22:15

## 2024-08-09 RX ADMIN — METFORMIN HYDROCHLORIDE 2000 MG: 500 TABLET ORAL at 05:06

## 2024-08-09 RX ADMIN — MONTELUKAST 10 MG: 10 TABLET, FILM COATED ORAL at 22:11

## 2024-08-09 RX ADMIN — ATORVASTATIN CALCIUM 40 MG: 40 TABLET, FILM COATED ORAL at 22:11

## 2024-08-09 RX ADMIN — LURASIDONE HYDROCHLORIDE 80 MG: 40 TABLET, FILM COATED ORAL at 17:21

## 2024-08-09 RX ADMIN — SERTRALINE HYDROCHLORIDE 150 MG: 50 TABLET ORAL at 05:07

## 2024-08-09 RX ADMIN — PRAZOSIN HYDROCHLORIDE 5 MG: 5 CAPSULE ORAL at 22:11

## 2024-08-09 RX ADMIN — INSULIN HUMAN 2 UNITS: 100 INJECTION, SOLUTION PARENTERAL at 17:21

## 2024-08-09 ASSESSMENT — ENCOUNTER SYMPTOMS
FEVER: 0
GASTROINTESTINAL NEGATIVE: 1
MUSCULOSKELETAL NEGATIVE: 1
CARDIOVASCULAR NEGATIVE: 1
PSYCHIATRIC NEGATIVE: 1
FOCAL WEAKNESS: 1

## 2024-08-09 ASSESSMENT — PAIN DESCRIPTION - PAIN TYPE: TYPE: ACUTE PAIN

## 2024-08-09 NOTE — DISCHARGE PLANNING
Case Management Discharge Planning    Admission Date: 8/5/2024  GMLOS:    ALOS: 0    6-Clicks ADL Score: 22  6-Clicks Mobility Score: 15  PT and/or OT Eval ordered: Yes  Post-acute Referrals Ordered: Yes  Post-acute Choice Obtained: No  Has referral(s) been sent to post-acute provider:  Yes      Anticipated Discharge Dispo: Discharge Disposition: Discharged to home/self care (01)    DME Needed: No    Action(s) Taken: LMSW met with pt at bedside to discuss DCP. LMSW informed pt of OT recommendation to DC to . Pt stated that he is not agreeable and would prefer rehab vs DC home with HH. Pt does not have local support to assist with care at home, rehab will most likely decline. LMSW requested HH order from MD.    Escalations Completed: None    Medically Clear: No    Next Steps: LMSW to follow for any additional CM needs.    Barriers to Discharge: Medical clearance    Is the patient up for discharge tomorrow: No

## 2024-08-09 NOTE — CARE PLAN
The patient is Stable - Low risk of patient condition declining or worsening    Shift Goals  Clinical Goals: Patient safety/ Monitor BS  Patient Goals: Rest/sleep  Family Goals: JUANCARLOS    Progress made toward(s) clinical / shift goals:   Patient denies any pain,on low air loss bed , bed extender attached to bed,able to rest comfortably, call light within reach. Pending placement.    Patient is not progressing towards the following goals:
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
## 2024-08-09 NOTE — PROGRESS NOTES
[facility] Medicine Daily Progress Note    Date of Service  8/8/2024    Chief Complaint  Norm Prabhakar is a 44 y.o. male admitted 8/5/2024 with right side weakness    Hospital Course  Norm Prabhakar is a 44 y.o. male with pmh of uncontrolled diabetes type 2, hypothyroidism who presented to the ED on 8/5/2024 with complaints of right-sided weakness. Patient reports a history of right-sided weakness due to previous stroke, but says that he has been unable to move his right arm today. He says he was cleaning his apartment today when his symptoms occurred. He denies any trauma to this arm, denies any changes to other extremities, denies changes in speech, swallowing. He denies dizziness, lightheadedness, shortness of breath, chest pain or palpitations. Denies recent cold or flulike symptoms. He says he has not taken any of his medications, including his diabetic medication the last couple days, as he was visiting with a friend and drinking some wine and did not want to mix alcohol with his medications.     ERP discussed case with neurologist, Dr. Garcia, who recommends no further stroke workup, as this patient has had multiple workup in the past and is well known to neurology service and has never had findings of acute stroke. Patient apparently has had at least 20 or so CTA's of head and neck and MRI brain in the past eight years at multiple institutions, per Dr. Garcia.   A nurse evaluation, patient was using his phone in his right hand, and using his right arm. However, he put this down on her entry to the room, and said he was unable to move his arm.      CBC significant for hemoglobin 12, but otherwise unremarkable. CMP shows sodium 132, glucose 351, BUN 26, otherwise unremarkable. A1c 10.3, troponin 24. EKG with no acute ST-T changes. CT head, CTA head and neck, show no acute findings under similar to prior. Chest x-ray with no acute findings.EKG with no acute STD changes.    Interval Problem Update  The patient states  that is unable to move his right side of his body.  This complaint of right-sided weakness is inconsistent when he complains about it as well as on exam.  MRI of the brain unremarkable for acute CVA.  Psychiatry consulted for possible conversion disorder.  Neurology has signed off.    8/7  Afebrile, HR 47-79, RR 16-18, SpO2 95-97% on room air.  No acute complaints to me, no reports of pain to me, feeling a bit weak.  Discussed need for possible rehab and he is agreeable for continued PT/OT work. Discussed with Dr. Jung, she notes he previously was on multiple AED's, not on any now, will discuss further with him regards to reason they were removed. Will obtain spot EEG in the AM to as prior EEG's had some high risk features and it is reasonable to rule out seizures causing any of his symptoms. Waiting for SNF, continue PT/OT. Psychiatry following, increased sertraline, stopped buspar, psychotherapy consult placed. Continuing latuda, prazosin.     8/8   Continue PT/OT while inpatient. Psych following.  He is agreeable to rehab when found, discharge planners working on it.   He isn't on depakote any longer because he never refilled his medication.  EEG ordered, will re-order his depakote however getting anaphylaxis allergy warnings, will discuss with pharmacy and patient. DC planning for SNF. Switched heparin to lovenox    I have discussed this patient's plan of care and discharge plan at IDT rounds today with Case Management, Nursing, Nursing leadership, and other members of the IDT team.    Consultants/Specialty  psychiatry    Code Status  Full Code    Disposition  Medically Cleared  I have placed the appropriate orders for post-discharge needs.    Review of Systems  Review of Systems   Constitutional:  Negative for fever and malaise/fatigue.   Cardiovascular: Negative.    Gastrointestinal: Negative.    Genitourinary: Negative.    Musculoskeletal: Negative.    Neurological:  Positive for focal weakness.    Psychiatric/Behavioral: Negative.          Physical Exam  Temp:  [36 °C (96.8 °F)-36.9 °C (98.5 °F)] 36 °C (96.8 °F)  Pulse:  [47-79] 68  Resp:  [16-18] 16  BP: ()/(40-81) 90/40  SpO2:  [93 %-97 %] 93 %    Physical Exam  Constitutional:       General: He is not in acute distress.     Appearance: He is not ill-appearing or toxic-appearing.   HENT:      Head: Normocephalic.      Nose: Nose normal. No rhinorrhea.      Mouth/Throat:      Mouth: Mucous membranes are moist.      Comments: Poor dentition  Eyes:      General: No scleral icterus.     Extraocular Movements: Extraocular movements intact.      Conjunctiva/sclera: Conjunctivae normal.   Cardiovascular:      Rate and Rhythm: Normal rate and regular rhythm.      Heart sounds: No murmur heard.     No gallop.   Pulmonary:      Effort: Pulmonary effort is normal. No respiratory distress.      Breath sounds: No stridor. No wheezing.   Abdominal:      General: There is no distension.      Palpations: Abdomen is soft.      Tenderness: There is no abdominal tenderness. There is no guarding or rebound.   Musculoskeletal:         General: No swelling.      Cervical back: Normal range of motion and neck supple. No rigidity.      Right lower leg: No edema.      Left lower leg: No edema.   Skin:     General: Skin is warm and dry.      Coloration: Skin is not jaundiced.   Neurological:      Mental Status: He is oriented to person, place, and time.      Cranial Nerves: No cranial nerve deficit.      Motor: Weakness (right side) present.      Comments: Does move his right side   Psychiatric:         Mood and Affect: Mood normal.      Comments: Odd affect         Fluids    Intake/Output Summary (Last 24 hours) at 8/9/2024 0617  Last data filed at 8/9/2024 0400  Gross per 24 hour   Intake 1200 ml   Output 1650 ml   Net -450 ml       Laboratory        Recent Labs     08/08/24  0725   SODIUM 137   POTASSIUM 3.9   CHLORIDE 100   CO2 23   GLUCOSE 152*   BUN 17   CREATININE  "0.80   CALCIUM 8.7                       Imaging  MR-BRAIN-W/O   Final Result      1.  Diffuse extensive leukoencephalopathy. This is unchanged since the previous MRI dated 8/14/2011.   2.  No acute abnormality.      DX-CHEST-PORTABLE (1 VIEW)   Final Result      1.  Hypoinflation with elevation of RIGHT hemidiaphragm.   2.  No pneumonia or pulmonary edema.      CT-CTA NECK WITH & W/O-POST PROCESSING   Final Result      1.  CT angiogram of the neck within normal limits.   2.  Redemonstrated heterogeneous midline anterior neck mass, unchanged from previous exam.      CT-CTA HEAD WITH & W/O-POST PROCESS   Final Result      CT angiogram of the [place] within normal limits.      CT-CEREBRAL PERFUSION ANALYSIS   Final Result      1. Cerebral blood flow less than 30% possibly representing completed infarct = 1 mL. Based on distribution of this finding, this is likely to represent artifact.      2. T Max more than 6 seconds possibly representing combination of completed infarct and ischemia = 7 mL. Based on the distribution of this finding, this is likely to represent artifact.      3. Mismatched volume possibly representing ischemic brain/penumbra= 6 mL      4.  Please note that this cerebral perfusion study and report is Quantitative and targets supratentorial (cerebral) perfusion for evaluation of large vessel territory acute ischemia/infarction. For example, lacunar infarcts, and brainstem/posterior fossa    ischemia/infarction are not evaluated on this study.  Data acquisition is subject to artifacts which can yield non-anatomically plausible perfusion maps which may be due to motion, bolus timing, signal to noise ratio, or other technical factors.    Perfusion map abnormalities which show non-anatomic distributions are likely artifact.   This study is not \"stand-alone\" and should only be utilized for diagnosis, management/treatment in correlation with CT, CTA, and/or MRI and clinical factors.         CT-HEAD " W/O   Final Result      1.  No acute intracranial abnormality.   2.  Diffuse low density again seen throughout the white matter, similar to prior exam, favoring demyelinating process.                    Assessment/Plan  * Right sided weakness- (present on admission)  Assessment & Plan  Per Dr. Delong, neurology, no further stroke workup necessary. Recommends psychiatric evaluation due to likely conversion disorder  -psychiatric consult placed    - Will continue q 4 hr eval  - PT/OT    Uncontrolled type 2 diabetes mellitus with hyperglycemia (HCC)- (present on admission)  Assessment & Plan  A1c>10.    - accucheck ac/hs with low dose ssi coverage  - Continue lantus 40 units HS  - Continue metformin  - DM diet    Hypothyroidism- (present on admission)  Assessment & Plan  - continue levothyroxine    Other hyperlipidemia- (present on admission)  Assessment & Plan  - continue statin, fenofibrate    Anxiety and depression- (present on admission)  Assessment & Plan  - resume zoloft and trazodone    Asthma- (present on admission)  Assessment & Plan  - Albuterol PRN         VTE prophylaxis:    enoxaparin ppx      I have performed a physical exam and reviewed and updated ROS and Plan today (8/8/2024). In review of yesterday's note (8/7/2024), there are no changes except as documented above.    Greater than 54 minutes spent prepping to see patient (e.g. review of tests) obtaining and/or reviewing separately obtained history. Performing a medically appropriate examination and/ evaluation.  Counseling and educating the patient/family/caregiver.  Ordering medications, tests, or procedures.  Referring and communicating with other health care professionals.  Documenting clinical information in EPIC.  Independently interpreting results and communicating results to patient/family/caregiver.  Care coordination.

## 2024-08-09 NOTE — PROCEDURES
VIDEO ELECTROENCEPHALOGRAM REPORT      Referring provider: Dr. Winston    DOS: 08/09/24 (total recording of 26 minutes).     INDICATION:  Norm Prabhakar 44 y.o. male presenting with abnormal EEG     CURRENT ANTIEPILEPTIC REGIMEN: none     TECHNIQUE: 30 channel video electroencephalogram (EEG) was performed in accordance with the international 10-20 system. The study was reviewed in bipolar and referential montages. The recording examined the patient during   awake and drowsy states    DESCRIPTION OF THE RECORD:  During the wakefulness, the background showed a symmetrical 9 Hz alpha activity posteriorly with amplitude of 70 mV.  There was reactivity to eye closure/opening.  A normal anterior-posterior gradient was noted with faster beta frequencies seen anteriorly.  During drowsiness, increased theta/beta frequencies were seen.    ACTIVATION PROCEDURES:     hyperventilation was not performed    Intermittent Photic stimulation was performed in a stepwise fashion from 1 to 30 Hz and elicited no photic driving response.     ICTAL AND/OR INTERICTAL FINDINGS:   Intermittent independent multifocal spikes/polyspikes seen in the left temporal, left centorparietal, left occipital and right centroparietal regions, at times in more generalized fashion with rhythmic 1.5 to 3 Hz up to 4 seconds without clinical correlate.  Occasional right frontal delta slowing  No clinical events or seizures were reported or recorded during the study.     EKG: sampling of the EKG recording demonstrated sinus rhythm.     EVENTS: none     INTERPRETATION:    This is an abnormal video EEG recording in the awake and drowsy states.   1) The presence of  independent multifocal spikes/polyspikes as well as more generalized discharges suggests increased risk of focal onset seizure over these regions.   2) The presence of right frontal delta slowing suggests focal neuronal dysfunction.  3) No electrographic seizure seen during this period of recording.  Clinical correlate is recommended.   4)Similar EEG findings were noted in 2021.     Ward Hendricks MD  Diplomate in Neurology&Epilepsy  Office: [phone]  Fax: [phone]

## 2024-08-10 ENCOUNTER — HOME HEALTH ADMISSION (OUTPATIENT)
Dept: HOME HEALTH SERVICES | Facility: HOME HEALTHCARE | Age: 44
End: 2024-08-10
Payer: MEDICAID

## 2024-08-10 VITALS
SYSTOLIC BLOOD PRESSURE: 109 MMHG | HEIGHT: 78 IN | WEIGHT: 252.43 LBS | HEART RATE: 74 BPM | OXYGEN SATURATION: 95 % | TEMPERATURE: 97.4 F | DIASTOLIC BLOOD PRESSURE: 75 MMHG | BODY MASS INDEX: 29.21 KG/M2 | RESPIRATION RATE: 17 BRPM

## 2024-08-10 LAB
ALBUMIN SERPL BCP-MCNC: 4.1 G/DL (ref 3.2–4.9)
ALBUMIN/GLOB SERPL: 1.7 G/DL
ALP SERPL-CCNC: 79 U/L (ref 30–99)
ALT SERPL-CCNC: 12 U/L (ref 2–50)
ANION GAP SERPL CALC-SCNC: 18 MMOL/L (ref 7–16)
AST SERPL-CCNC: 17 U/L (ref 12–45)
BASOPHILS # BLD AUTO: 0.8 % (ref 0–1.8)
BASOPHILS # BLD: 0.05 K/UL (ref 0–0.12)
BILIRUB SERPL-MCNC: 0.6 MG/DL (ref 0.1–1.5)
BUN SERPL-MCNC: 18 MG/DL (ref 8–22)
CALCIUM ALBUM COR SERPL-MCNC: 10 MG/DL (ref 8.5–10.5)
CALCIUM SERPL-MCNC: 10.1 MG/DL (ref 8.5–10.5)
CHLORIDE SERPL-SCNC: 98 MMOL/L (ref 96–112)
CO2 SERPL-SCNC: 20 MMOL/L (ref 20–33)
CREAT SERPL-MCNC: 1.01 MG/DL (ref 0.5–1.4)
EOSINOPHIL # BLD AUTO: 0.01 K/UL (ref 0–0.51)
EOSINOPHIL NFR BLD: 0.2 % (ref 0–6.9)
ERYTHROCYTE [DISTWIDTH] IN BLOOD BY AUTOMATED COUNT: 43.8 FL (ref 35.9–50)
GFR SERPLBLD CREATININE-BSD FMLA CKD-EPI: 95 ML/MIN/1.73 M 2
GLOBULIN SER CALC-MCNC: 2.4 G/DL (ref 1.9–3.5)
GLUCOSE BLD STRIP.AUTO-MCNC: 130 MG/DL (ref 65–99)
GLUCOSE BLD STRIP.AUTO-MCNC: 183 MG/DL (ref 65–99)
GLUCOSE BLD STRIP.AUTO-MCNC: 200 MG/DL (ref 65–99)
GLUCOSE SERPL-MCNC: 170 MG/DL (ref 65–99)
HCT VFR BLD AUTO: 36.2 % (ref 42–52)
HGB BLD-MCNC: 12.4 G/DL (ref 14–18)
IMM GRANULOCYTES # BLD AUTO: 0.04 K/UL (ref 0–0.11)
IMM GRANULOCYTES NFR BLD AUTO: 0.6 % (ref 0–0.9)
LYMPHOCYTES # BLD AUTO: 0.9 K/UL (ref 1–4.8)
LYMPHOCYTES NFR BLD: 14.6 % (ref 22–41)
MAGNESIUM SERPL-MCNC: 1.6 MG/DL (ref 1.5–2.5)
MCH RBC QN AUTO: 30.8 PG (ref 27–33)
MCHC RBC AUTO-ENTMCNC: 34.3 G/DL (ref 32.3–36.5)
MCV RBC AUTO: 89.8 FL (ref 81.4–97.8)
MONOCYTES # BLD AUTO: 0.37 K/UL (ref 0–0.85)
MONOCYTES NFR BLD AUTO: 6 % (ref 0–13.4)
NEUTROPHILS # BLD AUTO: 4.81 K/UL (ref 1.82–7.42)
NEUTROPHILS NFR BLD: 77.8 % (ref 44–72)
NRBC # BLD AUTO: 0 K/UL
NRBC BLD-RTO: 0 /100 WBC (ref 0–0.2)
PHOSPHATE SERPL-MCNC: 4.4 MG/DL (ref 2.5–4.5)
PLATELET # BLD AUTO: 220 K/UL (ref 164–446)
PMV BLD AUTO: 9.7 FL (ref 9–12.9)
POTASSIUM SERPL-SCNC: 4.2 MMOL/L (ref 3.6–5.5)
PROT SERPL-MCNC: 6.5 G/DL (ref 6–8.2)
RBC # BLD AUTO: 4.03 M/UL (ref 4.7–6.1)
SODIUM SERPL-SCNC: 136 MMOL/L (ref 135–145)
WBC # BLD AUTO: 6.2 K/UL (ref 4.8–10.8)

## 2024-08-10 PROCEDURE — 700102 HCHG RX REV CODE 250 W/ 637 OVERRIDE(OP): Mod: UD | Performed by: STUDENT IN AN ORGANIZED HEALTH CARE EDUCATION/TRAINING PROGRAM

## 2024-08-10 PROCEDURE — 82962 GLUCOSE BLOOD TEST: CPT

## 2024-08-10 PROCEDURE — 84100 ASSAY OF PHOSPHORUS: CPT

## 2024-08-10 PROCEDURE — 99239 HOSP IP/OBS DSCHRG MGMT >30: CPT | Performed by: STUDENT IN AN ORGANIZED HEALTH CARE EDUCATION/TRAINING PROGRAM

## 2024-08-10 PROCEDURE — A9270 NON-COVERED ITEM OR SERVICE: HCPCS | Mod: UD | Performed by: NURSE PRACTITIONER

## 2024-08-10 PROCEDURE — 80053 COMPREHEN METABOLIC PANEL: CPT

## 2024-08-10 PROCEDURE — 96372 THER/PROPH/DIAG INJ SC/IM: CPT

## 2024-08-10 PROCEDURE — G0378 HOSPITAL OBSERVATION PER HR: HCPCS

## 2024-08-10 PROCEDURE — RXMED WILLOW AMBULATORY MEDICATION CHARGE: Performed by: STUDENT IN AN ORGANIZED HEALTH CARE EDUCATION/TRAINING PROGRAM

## 2024-08-10 PROCEDURE — 36415 COLL VENOUS BLD VENIPUNCTURE: CPT

## 2024-08-10 PROCEDURE — A9270 NON-COVERED ITEM OR SERVICE: HCPCS | Mod: UD | Performed by: STUDENT IN AN ORGANIZED HEALTH CARE EDUCATION/TRAINING PROGRAM

## 2024-08-10 PROCEDURE — 700102 HCHG RX REV CODE 250 W/ 637 OVERRIDE(OP): Mod: UD | Performed by: NURSE PRACTITIONER

## 2024-08-10 PROCEDURE — 83735 ASSAY OF MAGNESIUM: CPT

## 2024-08-10 PROCEDURE — 85025 COMPLETE CBC W/AUTO DIFF WBC: CPT

## 2024-08-10 RX ORDER — DIVALPROEX SODIUM 500 MG/1
500 TABLET, FILM COATED, EXTENDED RELEASE ORAL DAILY
Status: DISCONTINUED | OUTPATIENT
Start: 2024-08-10 | End: 2024-08-10 | Stop reason: HOSPADM

## 2024-08-10 RX ORDER — DIVALPROEX SODIUM 500 MG/1
500 TABLET, FILM COATED, EXTENDED RELEASE ORAL DAILY
Qty: 30 TABLET | Refills: 1 | Status: SHIPPED | OUTPATIENT
Start: 2024-08-11

## 2024-08-10 RX ADMIN — FENOFIBRATE 201 MG: 134 CAPSULE ORAL at 05:38

## 2024-08-10 RX ADMIN — ASPIRIN 81 MG: 81 TABLET, CHEWABLE ORAL at 05:40

## 2024-08-10 RX ADMIN — DIVALPROEX SODIUM 500 MG: 500 TABLET, EXTENDED RELEASE ORAL at 08:08

## 2024-08-10 RX ADMIN — INSULIN HUMAN 1 UNITS: 100 INJECTION, SOLUTION PARENTERAL at 08:08

## 2024-08-10 RX ADMIN — LEVOTHYROXINE SODIUM 200 MCG: 0.2 TABLET ORAL at 05:39

## 2024-08-10 RX ADMIN — LORATADINE 10 MG: 10 TABLET ORAL at 05:41

## 2024-08-10 RX ADMIN — METFORMIN HYDROCHLORIDE 2000 MG: 500 TABLET ORAL at 05:39

## 2024-08-10 RX ADMIN — LURASIDONE HYDROCHLORIDE 80 MG: 40 TABLET, FILM COATED ORAL at 17:30

## 2024-08-10 RX ADMIN — LEVOTHYROXINE SODIUM 25 MCG: 0.03 TABLET ORAL at 08:08

## 2024-08-10 RX ADMIN — SERTRALINE HYDROCHLORIDE 150 MG: 50 TABLET ORAL at 05:41

## 2024-08-10 RX ADMIN — INSULIN GLARGINE-YFGN 40 UNITS: 100 INJECTION, SOLUTION SUBCUTANEOUS at 17:31

## 2024-08-10 ASSESSMENT — ENCOUNTER SYMPTOMS
PSYCHIATRIC NEGATIVE: 1
FOCAL WEAKNESS: 1
FEVER: 0
CARDIOVASCULAR NEGATIVE: 1
GASTROINTESTINAL NEGATIVE: 1
MUSCULOSKELETAL NEGATIVE: 1

## 2024-08-10 NOTE — DISCHARGE PLANNING
Case Management Discharge Planning    Admission Date: 8/5/2024  GMLOS:    ALOS: 0    6-Clicks ADL Score: 22  6-Clicks Mobility Score: 15  PT and/or OT Eval ordered: Yes  Post-acute Referrals Ordered: NA  Post-acute Choice Obtained: NA  Has referral(s) been sent to post-acute provider:  GI      Anticipated Discharge Dispo: Discharge Disposition: Discharged to home/self care (01)    DME Needed: No    Action(s) Taken: Referral(s) sent    This RN CM completed chart review and pt is going home with HH. Choice for [facility] and then [facility] as 2nd choice if needed.     This RN CM working on transport for 1630 for D/C home with wheelchair transport.    Working with leadership for approval for medical transport home as pt is 1 assist and is not able to take a taxi.     Leadership approval for wheelchair transport home waiting to confirm time and update bedside RN.    Escalations Completed: None    Medically Clear: Yes    Next Steps: This RN CM to assist with barriers to discharge.     Barriers to Discharge: None    Is the patient up for discharge tomorrow: No

## 2024-08-10 NOTE — PROGRESS NOTES
[facility] Medicine Daily Progress Note    Date of Service  8/9/2024    Chief Complaint  Norm Prabhakar is a 44 y.o. male admitted 8/5/2024 with right side weakness    Hospital Course  Norm Prabhakar is a 44 y.o. male with pmh of uncontrolled diabetes type 2, hypothyroidism who presented to the ED on 8/5/2024 with complaints of right-sided weakness. Patient reports a history of right-sided weakness due to previous stroke, but says that he has been unable to move his right arm today. He says he was cleaning his apartment today when his symptoms occurred. He denies any trauma to this arm, denies any changes to other extremities, denies changes in speech, swallowing. He denies dizziness, lightheadedness, shortness of breath, chest pain or palpitations. Denies recent cold or flulike symptoms. He says he has not taken any of his medications, including his diabetic medication the last couple days, as he was visiting with a friend and drinking some wine and did not want to mix alcohol with his medications.     ERP discussed case with neurologist, Dr. Garcia, who recommends no further stroke workup, as this patient has had multiple workup in the past and is well known to neurology service and has never had findings of acute stroke. Patient apparently has had at least 20 or so CTA's of head and neck and MRI brain in the past eight years at multiple institutions, per Dr. Garcia.   A nurse evaluation, patient was using his phone in his right hand, and using his right arm. However, he put this down on her entry to the room, and said he was unable to move his arm.      CBC significant for hemoglobin 12, but otherwise unremarkable. CMP shows sodium 132, glucose 351, BUN 26, otherwise unremarkable. A1c 10.3, troponin 24. EKG with no acute ST-T changes. CT head, CTA head and neck, show no acute findings under similar to prior. Chest x-ray with no acute findings.EKG with no acute STD changes.    Interval Problem Update  The patient states  that is unable to move his right side of his body.  This complaint of right-sided weakness is inconsistent when he complains about it as well as on exam.  MRI of the brain unremarkable for acute CVA.  Psychiatry consulted for possible conversion disorder.  Neurology has signed off.    8/7  Afebrile, HR 47-79, RR 16-18, SpO2 95-97% on room air.  No acute complaints to me, no reports of pain to me, feeling a bit weak.  Discussed need for possible rehab and he is agreeable for continued PT/OT work. Discussed with Dr. Jung, she notes he previously was on multiple AED's, not on any now, will discuss further with him regards to reason they were removed. Will obtain spot EEG in the AM to as prior EEG's had some high risk features and it is reasonable to rule out seizures causing any of his symptoms. Waiting for SNF, continue PT/OT. Psychiatry following, increased sertraline, stopped buspar, psychotherapy consult placed. Continuing latuda, prazosin.     8/8   Continue PT/OT while inpatient. Psych following.  He is agreeable to rehab when found, discharge planners working on it.   He isn't on depakote any longer because he never refilled his medication.  EEG ordered, will re-order his depakote however getting anaphylaxis allergy warnings, will discuss with pharmacy and patient. DC planning for SNF. Switched heparin to lovenox    8/9  No seizures on EEG. Discussed with patient, he doesn't take his depakote anymore because he ran out and didn't fill it. HE would like to restart, restarted. OT recommended group home, CM discussed with patient and he stated he lived in one before and does not want to go back, understands SNF's are not accepting him and prefers to go home with Home Health. Will arrange. Continue current therapies while here, psych following I appreciate their help.    I have discussed this patient's plan of care and discharge plan at IDT rounds today with Case Management, Nursing, Nursing leadership,  and other members of the IDT team.    Consultants/Specialty  psychiatry    Code Status  Full Code    Disposition  Medically Cleared  I have placed the appropriate orders for post-discharge needs.    Review of Systems  Review of Systems   Constitutional:  Negative for fever and malaise/fatigue.   Cardiovascular: Negative.    Gastrointestinal: Negative.    Genitourinary: Negative.    Musculoskeletal: Negative.    Neurological:  Positive for focal weakness.   Psychiatric/Behavioral: Negative.          Physical Exam  Temp:  [36.3 °C (97.3 °F)-36.6 °C (97.8 °F)] 36.3 °C (97.4 °F)  Pulse:  [54-75] 54  Resp:  [16-18] 16  BP: ()/(49-68) 90/49  SpO2:  [93 %-96 %] 95 %    Physical Exam  Constitutional:       General: He is not in acute distress.     Appearance: He is not ill-appearing or toxic-appearing.   HENT:      Head: Normocephalic.      Nose: Nose normal. No rhinorrhea.      Mouth/Throat:      Mouth: Mucous membranes are moist.      Comments: Poor dentition  Eyes:      General: No scleral icterus.     Extraocular Movements: Extraocular movements intact.      Conjunctiva/sclera: Conjunctivae normal.   Cardiovascular:      Rate and Rhythm: Normal rate and regular rhythm.      Heart sounds: No murmur heard.     No gallop.   Pulmonary:      Effort: Pulmonary effort is normal. No respiratory distress.      Breath sounds: No stridor. No wheezing.   Abdominal:      General: There is no distension.      Palpations: Abdomen is soft.      Tenderness: There is no abdominal tenderness. There is no guarding or rebound.   Musculoskeletal:         General: No swelling.      Cervical back: Normal range of motion and neck supple. No rigidity.      Right lower leg: No edema.      Left lower leg: No edema.   Skin:     General: Skin is warm and dry.      Capillary Refill: Capillary refill takes less than 2 seconds.      Coloration: Skin is not jaundiced.   Neurological:      Mental Status: He is oriented to person, place, and time.       Cranial Nerves: No cranial nerve deficit.      Motor: Weakness (right side) present.      Comments: Does move his right side   Psychiatric:         Mood and Affect: Mood normal.      Comments: Odd affect         Fluids    Intake/Output Summary (Last 24 hours) at 8/10/2024 0620  Last data filed at 8/10/2024 0350  Gross per 24 hour   Intake 240 ml   Output 2050 ml   Net -1810 ml       Laboratory        Recent Labs     08/08/24  0725   SODIUM 137   POTASSIUM 3.9   CHLORIDE 100   CO2 23   GLUCOSE 152*   BUN 17   CREATININE 0.80   CALCIUM 8.7                       Imaging  MR-BRAIN-W/O   Final Result      1.  Diffuse extensive leukoencephalopathy. This is unchanged since the previous MRI dated 8/14/2011.   2.  No acute abnormality.      DX-CHEST-PORTABLE (1 VIEW)   Final Result      1.  Hypoinflation with elevation of RIGHT hemidiaphragm.   2.  No pneumonia or pulmonary edema.      CT-CTA NECK WITH & W/O-POST PROCESSING   Final Result      1.  CT angiogram of the neck within normal limits.   2.  Redemonstrated heterogeneous midline anterior neck mass, unchanged from previous exam.      CT-CTA HEAD WITH & W/O-POST PROCESS   Final Result      CT angiogram of the [place] [facility] [place] within normal limits.      CT-CEREBRAL PERFUSION ANALYSIS   Final Result      1. Cerebral blood flow less than 30% possibly representing completed infarct = 1 mL. Based on distribution of this finding, this is likely to represent artifact.      2. T Max more than 6 seconds possibly representing combination of completed infarct and ischemia = 7 mL. Based on the distribution of this finding, this is likely to represent artifact.      3. Mismatched volume possibly representing ischemic brain/penumbra= 6 mL      4.  Please note that this cerebral perfusion study and report is Quantitative and targets supratentorial (cerebral) perfusion for evaluation of large vessel territory acute ischemia/infarction. For example, lacunar infarcts, and  "brainstem/posterior fossa    ischemia/infarction are not evaluated on this study.  Data acquisition is subject to artifacts which can yield non-anatomically plausible perfusion maps which may be due to motion, bolus timing, signal to noise ratio, or other technical factors.    Perfusion map abnormalities which show non-anatomic distributions are likely artifact.   This study is not \"stand-alone\" and should only be utilized for diagnosis, management/treatment in correlation with CT, CTA, and/or MRI and clinical factors.         CT-HEAD W/O   Final Result      1.  No acute intracranial abnormality.   2.  Diffuse low density again seen throughout the white matter, similar to prior exam, favoring demyelinating process.                    Assessment/Plan  * Right sided weakness- (present on admission)  Assessment & Plan  Per Dr. Delong, neurology, no further stroke workup necessary. Recommends psychiatric evaluation due to likely conversion disorder  -psychiatric consult placed    - Will continue q 4 hr eval  - PT/OT    Uncontrolled type 2 diabetes mellitus with hyperglycemia (HCC)- (present on admission)  Assessment & Plan  A1c>10.    - accucheck ac/hs with low dose ssi coverage  - Continue lantus 40 units HS  - Continue metformin  - DM diet    Hypothyroidism- (present on admission)  Assessment & Plan  - continue levothyroxine    Other hyperlipidemia- (present on admission)  Assessment & Plan  - continue statin, fenofibrate    Anxiety and depression- (present on admission)  Assessment & Plan  - resume zoloft and trazodone    Asthma- (present on admission)  Assessment & Plan  - Albuterol PRN         VTE prophylaxis:    enoxaparin ppx      I have performed a physical exam and reviewed and updated ROS and Plan today (8/9/2024). In review of yesterday's note (8/8/2024), there are no changes except as documented above.    Greater than 53 minutes spent prepping to see patient (e.g. review of tests) obtaining and/or reviewing " separately obtained history. Performing a medically appropriate examination and/ evaluation.  Counseling and educating the patient/family/caregiver.  Ordering medications, tests, or procedures.  Referring and communicating with other health care professionals.  Documenting clinical information in EPIC.  Independently interpreting results and communicating results to patient/family/caregiver.  Care coordination.

## 2024-08-10 NOTE — DISCHARGE PLANNING
Patient to transfer to his home today at [id number] via [facility]. RSV [id number] ost will be $ 127.98. Spoke with [facility], they state they have no W/C providers available today. BOUCHRA advised.  Bedside nurse Juan advised of transport time.

## 2024-08-10 NOTE — CONSULTS
Reviewed EEG results.  It does appear that the patient has areas that are increased risk for focal seizures.  As a result, we will defer on increasing any psychiatric medications at this time until the patient is back on antiepileptics as both antipsychotics and antidepressants can lower seizure threshold.     The patient did meet with psychotherapy today    Will continue to follow, will make further medication recommendations after patient has been restarted on antiepileptic medications    JAZMINE Yancey

## 2024-08-10 NOTE — DISCHARGE PLANNING
ATTN: Case Management  RE: Referral for Home Health    As of 8/10/2024, we have accepted the Home Health referral for the patient listed above.    A [facility] clinician will be out to see the patient within 48 hours. If you have any questions or concerns regarding the patient’s transition to Home Health, please do not hesitate to contact us at [phone].      We look forward to collaborating with you,  [facility] Health Team

## 2024-08-10 NOTE — CARE PLAN
The patient is Stable - Low risk of patient condition declining or worsening    Shift Goals  Clinical Goals: No decrease in neuro status  Patient Goals: Rest, DC  Family Goals: kristen    Progress made toward(s) clinical / shift goals:      Patient is not progressing towards the following goals:    Pt aox4. Pt makes needs known. No c/o pain or discomfort. BG covered via SSI. BRUNO.

## 2024-08-10 NOTE — DISCHARGE PLANNING
Good afternoon,  This referral has been escalated to a Clinical Supervisor for review in order to determine Home Health appropriateness.  This issue will be resolved as quickly as possible, but for any questions feel free to call us at [phone].  Thank you!

## 2024-08-10 NOTE — CARE PLAN
The patient is Stable - Low risk of patient condition declining or worsening    Shift Goals  Clinical Goals: Neuros unchanged or improved  Patient Goals: Improved neuros  Family Goals: JUANCARLOS    Progress made toward(s) clinical / shift goals:      Patient is not progressing towards the following goals:    Pt aox4. EEG completed. Pt makes needs known. No c/o pain or discomfort. BG covered via SSI. BRUNO.

## 2024-08-10 NOTE — CARE PLAN
The patient is Stable - Low risk of patient condition declining or worsening    Shift Goals  Clinical Goals: pt will have no neuro changes throughout shift  Patient Goals: rest  Family Goals: kristen    Progress made toward(s) clinical / shift goals: Pt AxOx4 when asked throughout shift, no changes in neuro noted during shift.      Problem: Neuro Status  Goal: Neuro status will remain stable or improve  Outcome: Progressing     Problem: Risk for Aspiration  Goal: Patient's risk for aspiration will be absent or decrease  Outcome: Progressing     Problem: Urinary Elimination  Goal: Establish and maintain regular urinary output  Outcome: Progressing     Problem: Self Care  Goal: Patient will have the ability to perform ADLs independently or with assistance (bathe, groom, dress, toilet and feed)  Outcome: Progressing     Problem: Skin Integrity  Goal: Skin integrity is maintained or improved  Outcome: Progressing     Problem: Fall Risk  Goal: Patient will remain free from falls  Outcome: Progressing     Problem: Pain - Standard  Goal: Alleviation of pain or a reduction in pain to the patient’s comfort goal  Outcome: Progressing       Patient is not progressing towards the following goals:

## 2024-08-11 NOTE — PROGRESS NOTES
Pt DC via medical transport. Pt acknowledged post DC plan. All questions answered and needs met. Pt left with all belongings.

## 2024-08-12 ENCOUNTER — APPOINTMENT (OUTPATIENT)
Dept: RADIOLOGY | Facility: MEDICAL CENTER | Age: 44
DRG: 195 | End: 2024-08-12
Attending: EMERGENCY MEDICINE
Payer: MEDICAID

## 2024-08-12 ENCOUNTER — HOSPITAL ENCOUNTER (INPATIENT)
Facility: MEDICAL CENTER | Age: 44
LOS: 1 days | DRG: 195 | End: 2024-08-13
Attending: EMERGENCY MEDICINE | Admitting: STUDENT IN AN ORGANIZED HEALTH CARE EDUCATION/TRAINING PROGRAM
Payer: MEDICAID

## 2024-08-12 DIAGNOSIS — R09.02 HYPOXIA: ICD-10-CM

## 2024-08-12 DIAGNOSIS — J96.01 ACUTE HYPOXIC RESPIRATORY FAILURE (HCC): ICD-10-CM

## 2024-08-12 DIAGNOSIS — R73.9 HYPERGLYCEMIA: ICD-10-CM

## 2024-08-12 DIAGNOSIS — E03.9 HYPOTHYROIDISM, UNSPECIFIED TYPE: ICD-10-CM

## 2024-08-12 DIAGNOSIS — R55 SYNCOPE, UNSPECIFIED SYNCOPE TYPE: ICD-10-CM

## 2024-08-12 DIAGNOSIS — J18.9 COMMUNITY ACQUIRED PNEUMONIA OF RIGHT UPPER LOBE OF LUNG: ICD-10-CM

## 2024-08-12 DIAGNOSIS — J18.9 PNEUMONIA OF RIGHT UPPER LOBE DUE TO INFECTIOUS ORGANISM: ICD-10-CM

## 2024-08-12 LAB
ALBUMIN SERPL BCP-MCNC: 3.9 G/DL (ref 3.2–4.9)
ALBUMIN/GLOB SERPL: 1.9 G/DL
ALP SERPL-CCNC: 72 U/L (ref 30–99)
ALT SERPL-CCNC: 20 U/L (ref 2–50)
ANION GAP SERPL CALC-SCNC: 14 MMOL/L (ref 7–16)
AST SERPL-CCNC: 21 U/L (ref 12–45)
BASOPHILS # BLD AUTO: 1.5 % (ref 0–1.8)
BASOPHILS # BLD: 0.09 K/UL (ref 0–0.12)
BILIRUB SERPL-MCNC: 0.4 MG/DL (ref 0.1–1.5)
BUN SERPL-MCNC: 15 MG/DL (ref 8–22)
CALCIUM ALBUM COR SERPL-MCNC: 8.5 MG/DL (ref 8.5–10.5)
CALCIUM SERPL-MCNC: 8.4 MG/DL (ref 8.5–10.5)
CHLORIDE SERPL-SCNC: 104 MMOL/L (ref 96–112)
CO2 SERPL-SCNC: 20 MMOL/L (ref 20–33)
CREAT SERPL-MCNC: 0.72 MG/DL (ref 0.5–1.4)
D DIMER PPP IA.FEU-MCNC: 0.4 UG/ML (FEU) (ref 0–0.5)
EKG IMPRESSION: NORMAL
EOSINOPHIL # BLD AUTO: 0.12 K/UL (ref 0–0.51)
EOSINOPHIL NFR BLD: 2 % (ref 0–6.9)
ERYTHROCYTE [DISTWIDTH] IN BLOOD BY AUTOMATED COUNT: 45.1 FL (ref 35.9–50)
FLUAV RNA SPEC QL NAA+PROBE: NEGATIVE
FLUBV RNA SPEC QL NAA+PROBE: NEGATIVE
GFR SERPLBLD CREATININE-BSD FMLA CKD-EPI: 117 ML/MIN/1.73 M 2
GLOBULIN SER CALC-MCNC: 2.1 G/DL (ref 1.9–3.5)
GLUCOSE BLD STRIP.AUTO-MCNC: 215 MG/DL (ref 65–99)
GLUCOSE BLD STRIP.AUTO-MCNC: 222 MG/DL (ref 65–99)
GLUCOSE BLD STRIP.AUTO-MCNC: 230 MG/DL (ref 65–99)
GLUCOSE SERPL-MCNC: 246 MG/DL (ref 65–99)
HCT VFR BLD AUTO: 34.6 % (ref 42–52)
HGB BLD-MCNC: 11.4 G/DL (ref 14–18)
IMM GRANULOCYTES # BLD AUTO: 0.05 K/UL (ref 0–0.11)
IMM GRANULOCYTES NFR BLD AUTO: 0.8 % (ref 0–0.9)
LYMPHOCYTES # BLD AUTO: 1.91 K/UL (ref 1–4.8)
LYMPHOCYTES NFR BLD: 31.9 % (ref 22–41)
MCH RBC QN AUTO: 30.1 PG (ref 27–33)
MCHC RBC AUTO-ENTMCNC: 32.9 G/DL (ref 32.3–36.5)
MCV RBC AUTO: 91.3 FL (ref 81.4–97.8)
MONOCYTES # BLD AUTO: 0.58 K/UL (ref 0–0.85)
MONOCYTES NFR BLD AUTO: 9.7 % (ref 0–13.4)
NEUTROPHILS # BLD AUTO: 3.24 K/UL (ref 1.82–7.42)
NEUTROPHILS NFR BLD: 54.1 % (ref 44–72)
NRBC # BLD AUTO: 0 K/UL
NRBC BLD-RTO: 0 /100 WBC (ref 0–0.2)
PLATELET # BLD AUTO: 246 K/UL (ref 164–446)
PMV BLD AUTO: 9.9 FL (ref 9–12.9)
POTASSIUM SERPL-SCNC: 4.1 MMOL/L (ref 3.6–5.5)
PROCALCITONIN SERPL-MCNC: 0.23 NG/ML
PROT SERPL-MCNC: 6 G/DL (ref 6–8.2)
RBC # BLD AUTO: 3.79 M/UL (ref 4.7–6.1)
RSV RNA SPEC QL NAA+PROBE: NEGATIVE
SARS-COV-2 RNA RESP QL NAA+PROBE: NOTDETECTED
SODIUM SERPL-SCNC: 138 MMOL/L (ref 135–145)
T4 FREE SERPL-MCNC: 0.66 NG/DL (ref 0.93–1.7)
TROPONIN T SERPL-MCNC: 11 NG/L (ref 6–19)
TSH SERPL DL<=0.005 MIU/L-ACNC: 54 UIU/ML (ref 0.38–5.33)
WBC # BLD AUTO: 6 K/UL (ref 4.8–10.8)

## 2024-08-12 PROCEDURE — 0241U HCHG SARS-COV-2 COVID-19 NFCT DS RESP RNA 4 TRGT ED POC: CPT

## 2024-08-12 PROCEDURE — 96365 THER/PROPH/DIAG IV INF INIT: CPT

## 2024-08-12 PROCEDURE — 71045 X-RAY EXAM CHEST 1 VIEW: CPT

## 2024-08-12 PROCEDURE — 84439 ASSAY OF FREE THYROXINE: CPT

## 2024-08-12 PROCEDURE — 36415 COLL VENOUS BLD VENIPUNCTURE: CPT

## 2024-08-12 PROCEDURE — 85379 FIBRIN DEGRADATION QUANT: CPT

## 2024-08-12 PROCEDURE — 99223 1ST HOSP IP/OBS HIGH 75: CPT | Performed by: STUDENT IN AN ORGANIZED HEALTH CARE EDUCATION/TRAINING PROGRAM

## 2024-08-12 PROCEDURE — 84484 ASSAY OF TROPONIN QUANT: CPT

## 2024-08-12 PROCEDURE — 93005 ELECTROCARDIOGRAM TRACING: CPT

## 2024-08-12 PROCEDURE — 770006 HCHG ROOM/CARE - MED/SURG/GYN SEMI*

## 2024-08-12 PROCEDURE — 99285 EMERGENCY DEPT VISIT HI MDM: CPT

## 2024-08-12 PROCEDURE — 700111 HCHG RX REV CODE 636 W/ 250 OVERRIDE (IP): Mod: JZ | Performed by: STUDENT IN AN ORGANIZED HEALTH CARE EDUCATION/TRAINING PROGRAM

## 2024-08-12 PROCEDURE — 70450 CT HEAD/BRAIN W/O DYE: CPT

## 2024-08-12 PROCEDURE — 85025 COMPLETE CBC W/AUTO DIFF WBC: CPT

## 2024-08-12 PROCEDURE — 700101 HCHG RX REV CODE 250: Performed by: EMERGENCY MEDICINE

## 2024-08-12 PROCEDURE — 700105 HCHG RX REV CODE 258: Performed by: EMERGENCY MEDICINE

## 2024-08-12 PROCEDURE — 84145 PROCALCITONIN (PCT): CPT

## 2024-08-12 PROCEDURE — 82962 GLUCOSE BLOOD TEST: CPT | Mod: 91

## 2024-08-12 PROCEDURE — A9270 NON-COVERED ITEM OR SERVICE: HCPCS | Performed by: STUDENT IN AN ORGANIZED HEALTH CARE EDUCATION/TRAINING PROGRAM

## 2024-08-12 PROCEDURE — 80053 COMPREHEN METABOLIC PANEL: CPT

## 2024-08-12 PROCEDURE — 700111 HCHG RX REV CODE 636 W/ 250 OVERRIDE (IP): Mod: JZ | Performed by: EMERGENCY MEDICINE

## 2024-08-12 PROCEDURE — 93005 ELECTROCARDIOGRAM TRACING: CPT | Performed by: EMERGENCY MEDICINE

## 2024-08-12 PROCEDURE — 84443 ASSAY THYROID STIM HORMONE: CPT

## 2024-08-12 PROCEDURE — 700102 HCHG RX REV CODE 250 W/ 637 OVERRIDE(OP): Performed by: STUDENT IN AN ORGANIZED HEALTH CARE EDUCATION/TRAINING PROGRAM

## 2024-08-12 RX ORDER — LEVOTHYROXINE SODIUM 50 UG/1
25 TABLET ORAL
Status: DISCONTINUED | OUTPATIENT
Start: 2024-08-12 | End: 2024-08-12

## 2024-08-12 RX ORDER — LEVOTHYROXINE SODIUM 100 UG/1
200 TABLET ORAL ONCE
Status: DISCONTINUED | OUTPATIENT
Start: 2024-08-12 | End: 2024-08-12

## 2024-08-12 RX ORDER — MONTELUKAST SODIUM 10 MG/1
10 TABLET ORAL DAILY
Status: DISCONTINUED | OUTPATIENT
Start: 2024-08-13 | End: 2024-08-13 | Stop reason: HOSPADM

## 2024-08-12 RX ORDER — TRAZODONE HYDROCHLORIDE 50 MG/1
50 TABLET, FILM COATED ORAL
Status: DISCONTINUED | OUTPATIENT
Start: 2024-08-12 | End: 2024-08-13 | Stop reason: HOSPADM

## 2024-08-12 RX ORDER — DIVALPROEX SODIUM 500 MG/1
500 TABLET, EXTENDED RELEASE ORAL DAILY
Status: DISCONTINUED | OUTPATIENT
Start: 2024-08-13 | End: 2024-08-13 | Stop reason: HOSPADM

## 2024-08-12 RX ORDER — ALBUTEROL SULFATE 90 UG/1
2 AEROSOL, METERED RESPIRATORY (INHALATION) EVERY 4 HOURS PRN
Status: DISCONTINUED | OUTPATIENT
Start: 2024-08-12 | End: 2024-08-13 | Stop reason: HOSPADM

## 2024-08-12 RX ORDER — ACETAMINOPHEN 325 MG/1
650 TABLET ORAL EVERY 4 HOURS PRN
Status: DISCONTINUED | OUTPATIENT
Start: 2024-08-12 | End: 2024-08-13 | Stop reason: HOSPADM

## 2024-08-12 RX ORDER — ZONISAMIDE 100 MG/1
200 CAPSULE ORAL NIGHTLY PRN
Status: DISCONTINUED | OUTPATIENT
Start: 2024-08-12 | End: 2024-08-13 | Stop reason: HOSPADM

## 2024-08-12 RX ORDER — LURASIDONE HYDROCHLORIDE 40 MG/1
80 TABLET, FILM COATED ORAL
Status: DISCONTINUED | OUTPATIENT
Start: 2024-08-12 | End: 2024-08-13 | Stop reason: HOSPADM

## 2024-08-12 RX ORDER — VITAMIN B COMPLEX
1000 TABLET ORAL
Status: DISCONTINUED | OUTPATIENT
Start: 2024-08-12 | End: 2024-08-13 | Stop reason: HOSPADM

## 2024-08-12 RX ORDER — AZITHROMYCIN 500 MG/5ML
500 INJECTION, POWDER, LYOPHILIZED, FOR SOLUTION INTRAVENOUS ONCE
Status: COMPLETED | OUTPATIENT
Start: 2024-08-12 | End: 2024-08-12

## 2024-08-12 RX ORDER — CEFTRIAXONE 2 G/1
2000 INJECTION, POWDER, FOR SOLUTION INTRAMUSCULAR; INTRAVENOUS ONCE
Status: DISCONTINUED | OUTPATIENT
Start: 2024-08-12 | End: 2024-08-12

## 2024-08-12 RX ORDER — LORATADINE 10 MG/1
10 TABLET ORAL DAILY
Status: DISCONTINUED | OUTPATIENT
Start: 2024-08-13 | End: 2024-08-13 | Stop reason: HOSPADM

## 2024-08-12 RX ORDER — ASPIRIN 81 MG/1
81 TABLET, CHEWABLE ORAL DAILY
Status: DISCONTINUED | OUTPATIENT
Start: 2024-08-13 | End: 2024-08-13 | Stop reason: HOSPADM

## 2024-08-12 RX ORDER — FENOFIBRATE 134 MG/1
134 CAPSULE ORAL DAILY
Status: DISCONTINUED | OUTPATIENT
Start: 2024-08-13 | End: 2024-08-13 | Stop reason: HOSPADM

## 2024-08-12 RX ORDER — SODIUM CHLORIDE 9 MG/ML
1000 INJECTION, SOLUTION INTRAVENOUS ONCE
Status: COMPLETED | OUTPATIENT
Start: 2024-08-12 | End: 2024-08-12

## 2024-08-12 RX ORDER — ENOXAPARIN SODIUM 100 MG/ML
40 INJECTION SUBCUTANEOUS DAILY
Status: DISCONTINUED | OUTPATIENT
Start: 2024-08-12 | End: 2024-08-13 | Stop reason: HOSPADM

## 2024-08-12 RX ORDER — PRAZOSIN HYDROCHLORIDE 5 MG/1
5 CAPSULE ORAL NIGHTLY
Status: DISCONTINUED | OUTPATIENT
Start: 2024-08-12 | End: 2024-08-13 | Stop reason: HOSPADM

## 2024-08-12 RX ORDER — LEVOTHYROXINE SODIUM 100 UG/1
200 TABLET ORAL
Status: DISCONTINUED | OUTPATIENT
Start: 2024-08-12 | End: 2024-08-12

## 2024-08-12 RX ORDER — ATORVASTATIN CALCIUM 40 MG/1
40 TABLET, FILM COATED ORAL NIGHTLY
Status: DISCONTINUED | OUTPATIENT
Start: 2024-08-12 | End: 2024-08-13 | Stop reason: HOSPADM

## 2024-08-12 RX ORDER — DEXTROSE MONOHYDRATE 25 G/50ML
25 INJECTION, SOLUTION INTRAVENOUS
Status: DISCONTINUED | OUTPATIENT
Start: 2024-08-12 | End: 2024-08-13 | Stop reason: HOSPADM

## 2024-08-12 RX ADMIN — AZITHROMYCIN 500 MG: 500 INJECTION, POWDER, LYOPHILIZED, FOR SOLUTION INTRAVENOUS at 14:46

## 2024-08-12 RX ADMIN — Medication 1000 UNITS: at 21:12

## 2024-08-12 RX ADMIN — ATORVASTATIN CALCIUM 40 MG: 40 TABLET, FILM COATED ORAL at 21:12

## 2024-08-12 RX ADMIN — ACETAMINOPHEN 650 MG: 325 TABLET ORAL at 17:41

## 2024-08-12 RX ADMIN — LURASIDONE HYDROCHLORIDE 80 MG: 40 TABLET, FILM COATED ORAL at 17:42

## 2024-08-12 RX ADMIN — INSULIN HUMAN 3 UNITS: 100 INJECTION, SOLUTION PARENTERAL at 17:49

## 2024-08-12 RX ADMIN — INSULIN GLARGINE-YFGN 40 UNITS: 100 INJECTION, SOLUTION SUBCUTANEOUS at 17:50

## 2024-08-12 RX ADMIN — SODIUM CHLORIDE 1000 ML: 9 INJECTION, SOLUTION INTRAVENOUS at 12:09

## 2024-08-12 RX ADMIN — ENOXAPARIN SODIUM 40 MG: 100 INJECTION SUBCUTANEOUS at 17:42

## 2024-08-12 ASSESSMENT — LIFESTYLE VARIABLES
TOTAL SCORE: 0
HAVE PEOPLE ANNOYED YOU BY CRITICIZING YOUR DRINKING: NO
ALCOHOL_USE: YES
TOTAL SCORE: 0
HOW MANY TIMES IN THE PAST YEAR HAVE YOU HAD 5 OR MORE DRINKS IN A DAY: 0
CONSUMPTION TOTAL: NEGATIVE
EVER FELT BAD OR GUILTY ABOUT YOUR DRINKING: NO
HAVE YOU EVER FELT YOU SHOULD CUT DOWN ON YOUR DRINKING: NO
AVERAGE NUMBER OF DAYS PER WEEK YOU HAVE A DRINK CONTAINING ALCOHOL: 3
DOES PATIENT WANT TO STOP DRINKING: NO
EVER HAD A DRINK FIRST THING IN THE MORNING TO STEADY YOUR NERVES TO GET RID OF A HANGOVER: NO
ON A TYPICAL DAY WHEN YOU DRINK ALCOHOL HOW MANY DRINKS DO YOU HAVE: 2
TOTAL SCORE: 0
SUBSTANCE_ABUSE: 0

## 2024-08-12 ASSESSMENT — SOCIAL DETERMINANTS OF HEALTH (SDOH)
WITHIN THE LAST YEAR, HAVE YOU BEEN AFRAID OF YOUR PARTNER OR EX-PARTNER?: NO
WITHIN THE LAST YEAR, HAVE YOU BEEN HUMILIATED OR EMOTIONALLY ABUSED IN OTHER WAYS BY YOUR PARTNER OR EX-PARTNER?: NO
WITHIN THE PAST 12 MONTHS, YOU WORRIED THAT YOUR FOOD WOULD RUN OUT BEFORE YOU GOT THE MONEY TO BUY MORE: SOMETIMES TRUE
WITHIN THE PAST 12 MONTHS, THE FOOD YOU BOUGHT JUST DIDN'T LAST AND YOU DIDN'T HAVE MONEY TO GET MORE: SOMETIMES TRUE
WITHIN THE LAST YEAR, HAVE YOU BEEN KICKED, HIT, SLAPPED, OR OTHERWISE PHYSICALLY HURT BY YOUR PARTNER OR EX-PARTNER?: NO
WITHIN THE LAST YEAR, HAVE TO BEEN RAPED OR FORCED TO HAVE ANY KIND OF SEXUAL ACTIVITY BY YOUR PARTNER OR EX-PARTNER?: NO
IN THE PAST 12 MONTHS, HAS THE ELECTRIC, GAS, OIL, OR WATER COMPANY THREATENED TO SHUT OFF SERVICE IN YOUR HOME?: NO

## 2024-08-12 ASSESSMENT — ENCOUNTER SYMPTOMS
CHILLS: 0
COUGH: 1
VOMITING: 0
ABDOMINAL PAIN: 0
SORE THROAT: 0
NAUSEA: 0
SHORTNESS OF BREATH: 0
FEVER: 0
DIZZINESS: 1
WEAKNESS: 1

## 2024-08-12 ASSESSMENT — COGNITIVE AND FUNCTIONAL STATUS - GENERAL
MOVING TO AND FROM BED TO CHAIR: A LITTLE
MOVING FROM LYING ON BACK TO SITTING ON SIDE OF FLAT BED: A LITTLE
SUGGESTED CMS G CODE MODIFIER MOBILITY: CK
WALKING IN HOSPITAL ROOM: A LITTLE
TURNING FROM BACK TO SIDE WHILE IN FLAT BAD: A LITTLE
CLIMB 3 TO 5 STEPS WITH RAILING: A LOT
HELP NEEDED FOR BATHING: A LITTLE
TOILETING: A LITTLE
STANDING UP FROM CHAIR USING ARMS: A LITTLE
MOBILITY SCORE: 17
DAILY ACTIVITIY SCORE: 22
SUGGESTED CMS G CODE MODIFIER DAILY ACTIVITY: CJ

## 2024-08-12 ASSESSMENT — PAIN DESCRIPTION - PAIN TYPE
TYPE: ACUTE PAIN
TYPE: ACUTE PAIN

## 2024-08-12 ASSESSMENT — FIBROSIS 4 INDEX
FIB4 SCORE: 0.8
FIB4 SCORE: 0.8
FIB4 SCORE: 0.94

## 2024-08-12 NOTE — PROGRESS NOTES
Assumed care of patient at 1530. Received report from Vicki ANNE. Pt is A&O x 4, on 2L NC. Reporting a pain level of 0/10. Assessment completed and VSS. Bowel sounds are active in all 4 quadrants. Breathing shallow. Pt ambulated from [facility] to hospital bed with shuffling gait. Pt oriented to room and use of call light. Skin check completed. Call light within reach and bed is locked and in lowest position. Plan of care discussed and patient does not have any further needs at this time.

## 2024-08-12 NOTE — ED NOTES
Med rec updated  and  complete. Allergies reviewed.    Pt confirmed name and date of birth.    Pt recently discharged from [facility].      No anticoagulant medications.      No outpatient antibiotic use at home.        Home pharmacy    [facility] [phone]

## 2024-08-12 NOTE — H&P
Hospital Medicine History & Physical Note    Date of Service  8/12/2024    Primary Care Physician  ANIKET Ellington    Consultants  NA    Specialist Names: NA    Code Status  Full Code    Chief Complaint  Chief Complaint   Patient presents with    Syncope     Patient reports he was dizzy and lightheaded while at the bank today and had a syncopal episode. No head strike. No blood thinners. Denies CP/SOB       History of Presenting Illness  Norm Prabhakar is a 44 y.o. male with a complex past medical history of type I DM, asthma, seizure disorder, conversion disorder, PTSD, hypothyroidism s/p thyroidectomy, poor medication compliance who presented 8/12/2024 with symptoms of dizziness and syncope while at a local bank today. He denies any previous symptoms, states he felt well this AM. Was recently discharged on 8/10/24 after a 5 day stay for hyperglycemia and unilateral weakness.   On examination he states he feels well now. Denies chest pain, SOB, dizziness. States he has been compliant with his medications.   On evaluation in the ER he was noted to be hypoxic at 86%, chest xray noted possible RUL infiltrate. His BP was soft 84/53. He was treated with IV antibiotics for PNA and given IVF. CT head to rule out injury given syncope, this was negative.       I discussed the plan of care with patient.    Review of Systems  Review of Systems   Constitutional:  Negative for chills and fever.   HENT:  Negative for congestion and sore throat.    Respiratory:  Positive for cough. Negative for shortness of breath.    Cardiovascular:  Negative for chest pain and leg swelling.   Gastrointestinal:  Negative for abdominal pain, nausea and vomiting.   Genitourinary:  Negative for dysuria.   Neurological:  Positive for dizziness and weakness.   Psychiatric/Behavioral:  Negative for substance abuse.        Past Medical History   has a past medical history of Anxiety, ASTHMA, Bipolar 1 disorder (HCC), Depression, Diabetes  "([facility]), Fall, Glaucoma, Glaucoma (1982), Hypothyroidism, Indigestion, Mental disorder, Murmur, Pneumonia, Psychiatric problem (2002), S/P thyroidectomy, Seizure (HCC) (2010), Seizure disorder ([facility]), and Unspecified disorder of thyroid.    Surgical History   has a past surgical history that includes eye surgery; thyroid lobectomy; and other.     Family History  family history includes Heart Disease in his mother; Hypertension in his mother; Lung Disease in his mother; Stroke in his maternal grandmother.   Family history reviewed with patient. There is no family history that is pertinent to the chief complaint.     Social History   reports that he quit smoking about 4 years ago. His smoking use included cigarettes and cigars. He has never used smokeless tobacco. He reports that he does not currently use alcohol. He reports that he does not currently use drugs after having used the following drugs: Inhaled.    Allergies  Allergies   Allergen Reactions    Abilify      \"Feeling tired, like I don't even know whats going on around me\"    Fish      Pt reports salmon causes him to be sick to his stomach  Not listed on MAR noted 2/3/2021      Geodon [Ziprasidone Hcl] Anaphylaxis     Anaphylaxis per patient    Aripiprazole      \"I became lethargic.\"    Hydroxyzine Itching     Pt will only state \"I feel itchy when I take it    Ziprasidone        Medications  Prior to Admission Medications   Prescriptions Last Dose Informant Patient Reported? Taking?   Fenofibrate 200 MG Cap 8/12/2024 at 0800 Patient Yes Yes   Sig: Take 200 mg by mouth every day.   albuterol 108 (90 Base) MCG/ACT Aero Soln inhalation aerosol 8/12/2024 at 0600 Patient Yes Yes   Sig: Inhale 2 Puffs every four hours as needed for Shortness of Breath.   aspirin (ASA) 81 MG Chew Tab chewable tablet 8/12/2024 at 0800 Patient Yes Yes   Sig: Chew 81 mg every day.   atorvastatin (LIPITOR) 40 MG Tab 8/11/2024 at 2100 Patient Yes Yes   Sig: Take 40 mg by mouth every " evening.   divalproex ER (DEPAKOTE ER) 500 MG TABLET SR 24 HR 8/12/2024 at 0800 Patient No Yes   Sig: Take 1 Tablet by mouth every day.   insulin glargine (LANTUS) 100 UNIT/ML SC SOLN 8/11/2024 at 2100 Patient Yes Yes   Sig: Inject 40 Units under the skin every evening.   insulin lispro 100 UNIT/ML INJ 8/12/2024 at 0800 Patient Yes Yes   Sig: Inject 2-9 Units under the skin 3 times a day before meals. If -200=2 units  201-250=3 units  251-300=5 units  301-350=6 units  351-400=8 units  >400=9 units   levothyroxine (SYNTHROID) 200 MCG Tab 8/12/2024 at 0600 Patient Yes Yes   Sig: Take 200 mcg by mouth every morning on an empty stomach. Take with levothyroxine 25 mcg = 225 mcg   levothyroxine (SYNTHROID) 25 MCG Tab 8/12/2024 at 0600 Patient Yes Yes   Sig: Take 25 mcg by mouth every morning on an empty stomach. Take  with levothyroxine 200 mcg = 225 mcg   loratadine (CLARITIN) 10 MG Tab 8/12/2024 at 0800 Patient Yes Yes   Sig: Take 10 mg by mouth every day.   lurasidone (LATUDA) 80 MG Tab 8/12/2024 at 1700 Patient Yes Yes   Sig: Take 80 mg by mouth with dinner.   metformin (GLUCOPHAGE) 1000 MG tablet 8/12/2024 at 0800 Patient Yes Yes   Sig: Take 2,000 mg by mouth every day. 1000 mg x 2 tablets = 2000 mg   montelukast (SINGULAIR) 10 MG Tab 8/12/2024 at 0800 Patient Yes Yes   Sig: Take 10 mg by mouth every day.   prazosin (MINIPRESS) 5 MG Cap 8/11/2024 at 1700 Patient Yes Yes   Sig: Take 5 mg by mouth every evening. Indications: Frightening Dreams   sertraline (ZOLOFT) 100 MG Tab 8/12/2024 at 0800 Patient Yes Yes   Sig: Take 150 mg by mouth every day. 100 mg x 1.5 tablets = 150 mg   traZODone (DESYREL) 50 MG Tab 8/11/2024 at 2100 Patient Yes Yes   Sig: Take 50 mg by mouth at bedtime as needed for Sleep. Indications: Trouble Sleeping   vitamin D3 (CHOLECALCIFEROL) 1000 Unit (25 mcg) Tab 8/11/2024 at 2100 Patient Yes Yes   Sig: Take 1,000 Units by mouth at bedtime.   zonisamide (ZONEGRAN) 100 MG Cap 8/11/2024 at 2100  Patient Yes Yes   Sig: Take 200 mg by mouth at bedtime as needed (sleep).      Facility-Administered Medications: None       Physical Exam  Temp:  [36.6 °C (97.8 °F)] 36.6 °C (97.8 °F)  Pulse:  [52-73] 55  Resp:  [14-20] 16  BP: ()/(53-67) 95/57  SpO2:  [91 %-97 %] 97 %  Blood Pressure: 95/57   Temperature: 36.6 °C (97.8 °F)   Pulse: (!) 55   Respiration: 16   Pulse Oximetry: 97 %       Physical Exam  Vitals and nursing note reviewed.   Constitutional:       Appearance: He is obese.      Comments: Disheveled, chronically ill appearing male    HENT:      Head: Normocephalic.      Mouth/Throat:      Mouth: Mucous membranes are moist.      Pharynx: Oropharynx is clear.   Eyes:      General: No scleral icterus.     Comments: Wears corrective lenses    Neck:      Comments: Throidectomy scar, anterior midline neck mass, non-tender  Cardiovascular:      Rate and Rhythm: Regular rhythm. Bradycardia present.      Heart sounds: Normal heart sounds.   Pulmonary:      Effort: No respiratory distress.      Breath sounds: Normal breath sounds. No wheezing or rales.   Abdominal:      Palpations: Abdomen is soft.   Musculoskeletal:      Right lower leg: No edema.      Left lower leg: No edema.   Skin:     General: Skin is warm.   Neurological:      Mental Status: He is alert and oriented to person, place, and time. Mental status is at baseline.   Psychiatric:      Comments: Flat affect         Laboratory:  Recent Labs     08/10/24  0908 08/12/24  1111   WBC 6.2 6.0   RBC 4.03* 3.79*   HEMOGLOBIN 12.4* 11.4*   HEMATOCRIT 36.2* 34.6*   MCV 89.8 91.3   MCH 30.8 30.1   MCHC 34.3 32.9   RDW 43.8 45.1   PLATELETCT 220 246   MPV 9.7 9.9     Recent Labs     08/10/24  0908 08/12/24  1111   SODIUM 136 138   POTASSIUM 4.2 4.1   CHLORIDE 98 104   CO2 20 20   GLUCOSE 170* 246*   BUN 18 15   CREATININE 1.01 0.72   CALCIUM 10.1 8.4*     Recent Labs     08/10/24  0908 08/12/24  1111   ALTSGPT 12 20   ASTSGOT 17 21   ALKPHOSPHAT 79 72  "  TBILIRUBIN 0.6 0.4   GLUCOSE 170* 246*         No results for input(s): \"NTPROBNP\" in the last 72 hours.      Recent Labs     08/12/24  1111   TROPONINT 11       Imaging:  CT-HEAD W/O   Final Result         1. Stable white matter disease.   2. No definite acute intracranial abnormality.               DX-CHEST-PORTABLE (1 VIEW)   Final Result      Mild atelectasis at the lung bases with a possible small area of infiltrate in the right upper lobe.          X-Ray:  I have personally reviewed the images and compared with prior images.  EKG:  I have personally reviewed the images and compared with prior images.    Assessment/Plan:  Justification for Admission Status  I anticipate this patient will require at least two midnights for appropriate medical management, necessitating inpatient admission because acute respiratory failure will need further evaluation, antibiotics and weaning of oxygen and evaluation for syncope      Patient will need a Med/Surg bed on MEDICAL service .  The need is secondary to PNA, O2 weaning .    * Acute hypoxic respiratory failure (HCC)- (present on admission)  Assessment & Plan  Syncopal event today, found to be hypoxic with O2 sat 86%, requiring 2L oxygen   Chest xray reviewed, subtle infiltrate in RUL  Given Abx in ER  Procal is negative, no elevated WBC, continue with course of azithromycin  Check viral panel  Wean O2    Right upper lobe pneumonia  Assessment & Plan  Subtle RUL infiltrate on imaging with associated hypoxia   Procal is negative  Continue with azithromycin  Check viral panel   Wean O2     Uncontrolled type 2 diabetes mellitus with hyperglycemia (HCC)- (present on admission)  Assessment & Plan  Uncontrolled DM, hyperglycemic on admission with   Poorly compliant, Ha1c 10.3%  Resume home lantus 40 units, moderate sliding scale   Likely resume metformin tomorrow   Hypoglycemic protocol     Seizure (HCC)- (present on admission)  Assessment & Plan  Hx of, syncopal episode " not consistent with seizure   Continue home seizure  medications   Seizure, aspiration and fall precautions     Syncope- (present on admission)  Assessment & Plan  [place]ope [place] at the bank today, proceeded by dizziness.   BP is soft   Given IVF and feeling much better   Recent echo this year without acute changes, no murmur on exam to suggest new valvular issue   Treat hypotension and above and monitor   Check Orthostatic vitals     Postoperative hypothyroidism- (present on admission)  Assessment & Plan  Post thyroidectomy, non-compliance with medications   Elevated TSH 50's, low t4  Resume synthroid 225 mcg   Will need OP follow up to repeat labs and ensure compliance     Bradycardia- (present on admission)  Assessment & Plan  Hx of, appears intermittent, does not appear to be symptomatic         VTE prophylaxis: enoxaparin ppx

## 2024-08-12 NOTE — ASSESSMENT & PLAN NOTE
Uncontrolled DM, hyperglycemic on admission with   Poorly compliant, Ha1c 10.3%  Resume home lantus 40 units, moderate sliding scale   Likely resume metformin tomorrow   Hypoglycemic protocol

## 2024-08-12 NOTE — ED NOTES
Patient hypoxic at 88% on RA at rest, placed on two liters nasal cannula. Patient endorses vaping daily and having an asthma history.

## 2024-08-12 NOTE — ASSESSMENT & PLAN NOTE
Post thyroidectomy, non-compliance with medications   Elevated TSH 50's, low t4  Resume synthroid 225 mcg   Will need OP follow up to repeat labs and ensure compliance

## 2024-08-12 NOTE — CARE PLAN
The patient is Stable - Low risk of patient condition declining or worsening    Shift Goals  Clinical Goals: Pt will remain free from falls, maintain stable blood sugars  Patient Goals: rest  Family Goals: JUANCARLOS    Progress made toward(s) clinical / shift goals:    Pt remained free from falls throughout shift. Bed is locked and in lowest position. Call light and personal belongings are within reach.  Pt maintained stable blood sugars throughout shift with the use of diet and insulin.      Problem: Knowledge Deficit - Standard  Goal: Patient and family/care givers will demonstrate understanding of plan of care, disease process/condition, diagnostic tests and medications  Outcome: Progressing       Patient is not progressing towards the following goals:

## 2024-08-12 NOTE — ED TRIAGE NOTES
"..  Chief Complaint   Patient presents with    Syncope     Patient reports he was dizzy and lightheaded [place] the bank today and had a syncopal episode. No head strike. No blood thinners. Denies CP/SOB       45 yo male brought in by EMS for above complaint. Recently discharged from [facility] on Saturday for right sided weakness. Initial BP on scene in the 80's.     Patient was given 800cc's NS en route. Blood glucose 230. EKG completed on arrival.     BP 91/62   Pulse 72   Temp 36.6 °C (97.8 °F) (Temporal)   Resp 20   Ht 1.981 m (6' 6\")   Wt 114 kg (252 lb)   SpO2 92%   BMI 29.12 kg/m²     "

## 2024-08-12 NOTE — ASSESSMENT & PLAN NOTE
Hx of, syncopal episode not consistent with seizure   Continue home seizure  medications   Seizure, aspiration and fall precautions

## 2024-08-12 NOTE — PROGRESS NOTES
4 Eyes Skin Assessment Completed by OMID Wilson and OMID Uribe.    Head WDL  Ears WDL  Nose WDL  Mouth WDL  Neck WDL  Breast/Chest WDL  Shoulder Blades Dry, Red  Spine WDL  (R) Arm/Elbow/Hand WDL  (L) Arm/Elbow/Hand WDL  Abdomen WDL  Groin WDL  Scrotum/Coccyx/Buttocks WDL  (R) Leg WDL  (L) Leg WDL  (R) Heel/Foot/Toe Dry, Dirty  (L) Heel/Foot/Toe Dry, Dirty          Devices In Places Nasal Cannula      Interventions In Place Gray Ear Foams and Pillows    Possible Skin Injury No    Pictures Uploaded Into Epic N/A  Wound Consult Placed N/A  RN Wound Prevention Protocol Ordered No

## 2024-08-12 NOTE — ASSESSMENT & PLAN NOTE
Subtle RUL infiltrate on imaging with associated hypoxia   Procal is negative  Continue with azithromycin  Check viral panel   Wean O2

## 2024-08-12 NOTE — ED NOTES
Discussed ongoing hypoxia with ERP, patient desaturating to 87% on RA, placed back on two liters. Patient endorsing ongoing dizziness at rest.

## 2024-08-12 NOTE — ED PROVIDER NOTES
ED Provider Note    CHIEF COMPLAINT  Chief Complaint   Patient presents with    Syncope     Patient reports he was dizzy and lightheaded while at the bank today and had a syncopal episode. No head strike. No blood thinners. Denies CP/SOB       EXTERNAL RECORDS REVIEWED  Reviewed previous ER visits including baseline laboratory studies    HPI/ROS  LIMITATION TO HISTORY   None  OUTSIDE HISTORIAN(S):  None    Norm Prabhakar is a 44 y.o. male who presents for a constellation of symptoms that all stemmed from an event that happened at a bank earlier today.  The patient has a history of anxiety poorly controlled diabetes hypothyroidism.  He had a syncopal event at a bank.  This was witnessed there is no report of seizure activity.  He is did strike his head but did not have prolonged loss of consciousness or postictal state.  Patient was just recently hospitalized for stroke workup which was subsequently negative.  He reports poor compliance with all of his medications.  He reports not feeling well the past few days and having a cough.  No other symptoms such as focal numbness weakness to arms legs or face    PAST MEDICAL HISTORY   has a past medical history of Anxiety, ASTHMA, Bipolar 1 disorder ([facility]), Depression, Diabetes ([facility]), Fall, Glaucoma, Glaucoma (), Hypothyroidism, Indigestion, Mental disorder, Murmur, Pneumonia, Psychiatric problem (), S/P thyroidectomy, Seizure ([facility]) (), Seizure disorder ([facility]), and Unspecified disorder of thyroid.    SURGICAL HISTORY   has a past surgical history that includes eye surgery; thyroid lobectomy; and other.    FAMILY HISTORY  Family History   Problem Relation Age of Onset    Hypertension Mother     Heart Disease Mother     Lung Disease Mother     Stroke Maternal Grandmother        SOCIAL HISTORY  Social History     Tobacco Use    Smoking status: Former     Current packs/day: 0.00     Types: Cigarettes, Cigars     Quit date: 2020     Years since quittin.3     "Smokeless tobacco: Never   Vaping Use    Vaping status: Every Day    Start date: 8/1/2023    Substances: Nicotine, THC, CBD, Flavoring    Devices: Disposable, Pre-filled or refillable cartridge, Pre-filled pod   Substance and Sexual Activity    Alcohol use: Not Currently     Comment: occasionally    Drug use: Not Currently     Types: Inhaled     Comment: marijuana every few days    Sexual activity: Not Currently       CURRENT MEDICATIONS  Home Medications       Reviewed by Vicki Malik R.N. (Registered Nurse) on 08/12/24 at 1104  Med List Status: Partial     Medication Last Dose Status   albuterol 108 (90 Base) MCG/ACT Aero Soln inhalation aerosol  Active   aspirin (ASA) 81 MG Chew Tab chewable tablet  Active   atorvastatin (LIPITOR) 40 MG Tab  Active   divalproex ER (DEPAKOTE ER) 500 MG TABLET SR 24 HR  Active   Fenofibrate 200 MG Cap  Active   insulin glargine (LANTUS) 100 UNIT/ML SC SOLN  Active   insulin lispro 100 UNIT/ML INJ  Active   levothyroxine (SYNTHROID) 200 MCG Tab  Active   levothyroxine (SYNTHROID) 25 MCG Tab  Active   loratadine (CLARITIN) 10 MG Tab  Active   lurasidone (LATUDA) 80 MG Tab  Active   metformin (GLUCOPHAGE) 1000 MG tablet  Active   montelukast (SINGULAIR) 10 MG Tab  Active   prazosin (MINIPRESS) 5 MG Cap  Active   sertraline (ZOLOFT) 100 MG Tab  Active   traZODone (DESYREL) 50 MG Tab  Active   vitamin D3 (CHOLECALCIFEROL) 1000 Unit (25 mcg) Tab  Active   zonisamide (ZONEGRAN) 100 MG Cap  Active                  Audit from Redirected Encounters    **Home medications have not yet been reviewed for this encounter**         ALLERGIES  Allergies   Allergen Reactions    Abilify      \"Feeling tired, like I don't even know whats going on around me\"    Fish      Pt reports salmon causes him to be sick to his stomach  Not listed on MAR noted 2/3/2021      Geodon [Ziprasidone Hcl] Anaphylaxis     Anaphylaxis per patient    Aripiprazole      \"I became lethargic.\"    Hydroxyzine Itching     Pt " "will only state \"I feel itchy when I take it    Ziprasidone        PHYSICAL EXAM  VITAL SIGNS: /67   Pulse (!) 56   Temp 36.6 °C (97.8 °F) (Temporal)   Resp 16   Ht 1.981 m (6' 6\")   Wt 114 kg (252 lb)   SpO2 94%   BMI 29.12 kg/m²    Pulse ox interpretation: Patient is hypoxic on room air  Constitutional: Alert and oriented x 3, patient appears chronically ill  HEENT: Atraumatic normocephalic, pupils are equal round reactive to light extraocular movements are intact. The nares is clear, external ears are normal, mouth shows moist mucous membranes normal dentition for age  Neck: Supple, no JVD no tracheal deviation  Cardiovascular: Regular rate and rhythm no murmur rub or gallop 2+ pulses peripherally x4  Thorax & Lungs: N rhonchi noted in the right upper lung fields  GI: Soft nontender nondistended positive bowel sounds, no peritoneal signs rebound guarding or rigidity  Skin: Warm dry no acute rash or lesion  Musculoskeletal: Moving all extremities with full range and 5 of 5 strength no acute  deformity no gross deformities  Neurologic: Cranial nerves III through XII are grossly intact no sensory deficit no cerebellar dysfunction   Psychiatric: Appropriate affect for situation at this time          EKG/LABS  Results for orders placed or performed during the hospital encounter of 08/12/24   TSH WITH REFLEX TO FT4   Result Value Ref Range    TSH 54.000 (H) 0.380 - 5.330 uIU/mL   CBC WITH DIFFERENTIAL   Result Value Ref Range    WBC 6.0 4.8 - 10.8 K/uL    RBC 3.79 (L) 4.70 - 6.10 M/uL    Hemoglobin 11.4 (L) 14.0 - 18.0 g/dL    Hematocrit 34.6 (L) 42.0 - 52.0 %    MCV 91.3 81.4 - 97.8 fL    MCH 30.1 27.0 - 33.0 pg    MCHC 32.9 32.3 - 36.5 g/dL    RDW 45.1 35.9 - 50.0 fL    Platelet Count 246 164 - 446 K/uL    MPV 9.9 9.0 - 12.9 fL    Neutrophils-Polys 54.10 44.00 - 72.00 %    Lymphocytes 31.90 22.00 - 41.00 %    Monocytes 9.70 0.00 - 13.40 %    Eosinophils 2.00 0.00 - 6.90 %    Basophils 1.50 0.00 - 1.80 % "    Immature Granulocytes 0.80 0.00 - 0.90 %    Nucleated RBC 0.00 0.00 - 0.20 /100 WBC    Neutrophils (Absolute) 3.24 1.82 - 7.42 K/uL    Lymphs (Absolute) 1.91 1.00 - 4.80 K/uL    Monos (Absolute) 0.58 0.00 - 0.85 K/uL    Eos (Absolute) 0.12 0.00 - 0.51 K/uL    Baso (Absolute) 0.09 0.00 - 0.12 K/uL    Immature Granulocytes (abs) 0.05 0.00 - 0.11 K/uL    NRBC (Absolute) 0.00 K/uL   Comp Metabolic Panel   Result Value Ref Range    Sodium 138 135 - 145 mmol/L    Potassium 4.1 3.6 - 5.5 mmol/L    Chloride 104 96 - 112 mmol/L    Co2 20 20 - 33 mmol/L    Anion Gap 14.0 7.0 - 16.0    Glucose 246 (H) 65 - 99 mg/dL    Bun 15 8 - 22 mg/dL    Creatinine 0.72 0.50 - 1.40 mg/dL    Calcium 8.4 (L) 8.5 - 10.5 mg/dL    Correct Calcium 8.5 8.5 - 10.5 mg/dL    AST(SGOT) 21 12 - 45 U/L    ALT(SGPT) 20 2 - 50 U/L    Alkaline Phosphatase 72 30 - 99 U/L    Total Bilirubin 0.4 0.1 - 1.5 mg/dL    Albumin 3.9 3.2 - 4.9 g/dL    Total Protein 6.0 6.0 - 8.2 g/dL    Globulin 2.1 1.9 - 3.5 g/dL    A-G Ratio 1.9 g/dL   TROPONIN   Result Value Ref Range    Troponin T 11 6 - 19 ng/L   ESTIMATED GFR   Result Value Ref Range    GFR (CKD-EPI) 117 >60 mL/min/1.73 m 2   FREE THYROXINE   Result Value Ref Range    Free T-4 0.66 (L) 0.93 - 1.70 ng/dL   D-DIMER   Result Value Ref Range    D-Dimer 0.40 0.00 - 0.50 ug/mL (FEU)   PROCALCITONIN   Result Value Ref Range    Procalcitonin 0.23 <0.25 ng/mL   EKG   Result Value Ref Range    Report       [facility] Emergency Dept.    Test Date:  2024  Pt Name:    HOLLY KIM                 Department: ER  MRN:        [id number]                      Room:        21 H  Gender:     Male                         Technician: [id number]  :        1980                   Requested By:ER TRIAGE PROTOCOL  Order #:    [id number]                    Reading MD: RED CARPENTER MD    Measurements  Intervals                                Axis  Rate:       84                           P:           51  WV:         160                          QRS:        -24  QRSD:       119                          T:          32  QT:         394  QTc:        466    Interpretive Statements  Sinus rhythm  Nonspecific intraventricular conduction delay  Low voltage, extremity leads  Compared to ECG 08/05/2024 14:11:17  No significant changes  Electronically Signed On 08- 11:51:24 PDT by RED CARPENTER MD     POCT glucose device results   Result Value Ref Range    POC Glucose, Blood 230 (H) 65 - 99 mg/dL       I have independently interpreted this EKG  12-lead EKG interpretation by me rate 84 sinus rhythm nonspecific intraventricular conduction delay no acute ST segment elevation or depression no evidence of arrhythmia heart block or ischemia  RADIOLOGY/PROCEDURES   I have independently interpreted the diagnostic imaging associated with this visit and am waiting the final reading from the radiologist.   My preliminary interpretation is as follows: Right upper lobe pneumonia    Radiologist interpretation:  DX-CHEST-PORTABLE (1 VIEW)   Final Result      Mild atelectasis at the lung bases with a possible small area of infiltrate in the right upper lobe.      CT-HEAD W/O    (Results Pending)       COURSE & MEDICAL DECISION MAKING    ASSESSMENT, COURSE AND PLAN  Care Narrative:     This is a 44-year-old chronically ill gentleman who presented here after a syncopal event.  He is noted to be slightly hypotensive in the field and was hypoxic.  Differential diagnosis was extensive including infectious etiology such as pneumonia, cardiogenic syncope pulmonary embolism airspace disease not exclusively.  Patient required supplemental oxygen.  Workup here reveals normal white blood cell count chronic anemia hyperglycemia thought acidosis.  His TSH is profoundly elevated and free T4 is low and chest x-ray suggest right upper lobe airspace disease.  D-dimer is negative procalcitonin is normal therefore CT scan was not performed.  Due to  current shortage of blood culture media the patient did not meet institutional criteria for blood cultures therefore they are not performed.  He was given IV fluids as well as azithromycin and Rocephin per pharmacy's recommendations.  He will also be given a dose of Synthroid.  Consultation with the hospitalist was obtained for admission.  Due to high volumes on Monday afternoon there is still delay getting the CT scan of the head.  The hospitalist will follow-up on this    Hydration: Based on the patient's presentation of Hyperglycemia the patient was given IV fluids. IV Hydration was used because oral hydration was not adequate alone. Upon recheck following hydration, the patient was improving.          ADDITIONAL PROBLEMS MANAGED      DISPOSITION AND DISCUSSIONS  I have discussed management of the patient with the following physicians and MANNY's: Discussed with admitting hospitalist    Discussion of management with other QHP or appropriate source(s): Discussed with ER pharmacist    Escalation of care considered, and ultimately not performed: None    Barriers to care at this time, including but not limited to: None.     Decision tools and prescription drugs considered including, but not limited to: None.    FINAL DIAGNOSIS  1. Hypoxia    2. Community acquired pneumonia of right upper lobe of lung    3. Hypothyroidism, unspecified type    4. Syncope, unspecified syncope type    5. Hyperglycemia         Electronically signed by: Adonis Sharma M.D., 8/12/2024 11:50 AM

## 2024-08-12 NOTE — ASSESSMENT & PLAN NOTE
Syncope while at the bank today, proceeded by dizziness.   BP is soft   Given IVF and feeling much better   Recent echo this year without acute changes, no murmur on exam to suggest new valvular issue   Treat hypotension and above and monitor   Check Orthostatic vitals

## 2024-08-12 NOTE — ASSESSMENT & PLAN NOTE
Syncopal event today, found to be hypoxic with O2 sat 86%, requiring 2L oxygen   Chest xray reviewed, subtle infiltrate in RUL  Given Abx in ER  Procal is negative, no elevated WBC, continue with course of azithromycin  Check viral panel  Wean O2

## 2024-08-13 ENCOUNTER — HOME HEALTH ADMISSION (OUTPATIENT)
Dept: HOME HEALTH SERVICES | Facility: HOME HEALTHCARE | Age: 44
End: 2024-08-13
Payer: MEDICAID

## 2024-08-13 ENCOUNTER — PHARMACY VISIT (OUTPATIENT)
Dept: PHARMACY | Facility: MEDICAL CENTER | Age: 44
End: 2024-08-13
Payer: COMMERCIAL

## 2024-08-13 VITALS
DIASTOLIC BLOOD PRESSURE: 78 MMHG | OXYGEN SATURATION: 98 % | TEMPERATURE: 96.7 F | SYSTOLIC BLOOD PRESSURE: 126 MMHG | HEART RATE: 57 BPM | RESPIRATION RATE: 16 BRPM | HEIGHT: 78 IN | BODY MASS INDEX: 29.16 KG/M2 | WEIGHT: 251.99 LBS

## 2024-08-13 LAB
ALBUMIN SERPL BCP-MCNC: 4.1 G/DL (ref 3.2–4.9)
ALBUMIN/GLOB SERPL: 2 G/DL
ALP SERPL-CCNC: 73 U/L (ref 30–99)
ALT SERPL-CCNC: 22 U/L (ref 2–50)
ANION GAP SERPL CALC-SCNC: 12 MMOL/L (ref 7–16)
ANISOCYTOSIS BLD QL SMEAR: ABNORMAL
AST SERPL-CCNC: 22 U/L (ref 12–45)
BASOPHILS # BLD AUTO: 0 % (ref 0–1.8)
BASOPHILS # BLD: 0 K/UL (ref 0–0.12)
BILIRUB SERPL-MCNC: 0.4 MG/DL (ref 0.1–1.5)
BUN SERPL-MCNC: 16 MG/DL (ref 8–22)
CALCIUM ALBUM COR SERPL-MCNC: 8.9 MG/DL (ref 8.5–10.5)
CALCIUM SERPL-MCNC: 9 MG/DL (ref 8.5–10.5)
CHLORIDE SERPL-SCNC: 100 MMOL/L (ref 96–112)
CO2 SERPL-SCNC: 25 MMOL/L (ref 20–33)
CREAT SERPL-MCNC: 0.83 MG/DL (ref 0.5–1.4)
EOSINOPHIL # BLD AUTO: 0.17 K/UL (ref 0–0.51)
EOSINOPHIL NFR BLD: 2.7 % (ref 0–6.9)
ERYTHROCYTE [DISTWIDTH] IN BLOOD BY AUTOMATED COUNT: 46.6 FL (ref 35.9–50)
GFR SERPLBLD CREATININE-BSD FMLA CKD-EPI: 112 ML/MIN/1.73 M 2
GLOBULIN SER CALC-MCNC: 2.1 G/DL (ref 1.9–3.5)
GLUCOSE BLD STRIP.AUTO-MCNC: 141 MG/DL (ref 65–99)
GLUCOSE BLD STRIP.AUTO-MCNC: 161 MG/DL (ref 65–99)
GLUCOSE BLD STRIP.AUTO-MCNC: 256 MG/DL (ref 65–99)
GLUCOSE SERPL-MCNC: 167 MG/DL (ref 65–99)
HCT VFR BLD AUTO: 35.5 % (ref 42–52)
HGB BLD-MCNC: 11.7 G/DL (ref 14–18)
LYMPHOCYTES # BLD AUTO: 2.39 K/UL (ref 1–4.8)
LYMPHOCYTES NFR BLD: 38 % (ref 22–41)
MANUAL DIFF BLD: NORMAL
MCH RBC QN AUTO: 30.9 PG (ref 27–33)
MCHC RBC AUTO-ENTMCNC: 33 G/DL (ref 32.3–36.5)
MCV RBC AUTO: 93.7 FL (ref 81.4–97.8)
METAMYELOCYTES NFR BLD MANUAL: 0.9 %
MICROCYTES BLD QL SMEAR: ABNORMAL
MONOCYTES # BLD AUTO: 0.39 K/UL (ref 0–0.85)
MONOCYTES NFR BLD AUTO: 6.2 % (ref 0–13.4)
MORPHOLOGY BLD-IMP: NORMAL
NEUTROPHILS # BLD AUTO: 3.29 K/UL (ref 1.82–7.42)
NEUTROPHILS NFR BLD: 52.2 % (ref 44–72)
NRBC # BLD AUTO: 0 K/UL
NRBC BLD-RTO: 0 /100 WBC (ref 0–0.2)
PLATELET # BLD AUTO: 243 K/UL (ref 164–446)
PLATELET BLD QL SMEAR: NORMAL
PMV BLD AUTO: 9.8 FL (ref 9–12.9)
POTASSIUM SERPL-SCNC: 3.9 MMOL/L (ref 3.6–5.5)
PROT SERPL-MCNC: 6.2 G/DL (ref 6–8.2)
RBC # BLD AUTO: 3.79 M/UL (ref 4.7–6.1)
RBC BLD AUTO: PRESENT
SODIUM SERPL-SCNC: 137 MMOL/L (ref 135–145)
WBC # BLD AUTO: 6.3 K/UL (ref 4.8–10.8)

## 2024-08-13 PROCEDURE — 80053 COMPREHEN METABOLIC PANEL: CPT

## 2024-08-13 PROCEDURE — 700102 HCHG RX REV CODE 250 W/ 637 OVERRIDE(OP): Performed by: STUDENT IN AN ORGANIZED HEALTH CARE EDUCATION/TRAINING PROGRAM

## 2024-08-13 PROCEDURE — 85027 COMPLETE CBC AUTOMATED: CPT

## 2024-08-13 PROCEDURE — A9270 NON-COVERED ITEM OR SERVICE: HCPCS | Performed by: STUDENT IN AN ORGANIZED HEALTH CARE EDUCATION/TRAINING PROGRAM

## 2024-08-13 PROCEDURE — 82962 GLUCOSE BLOOD TEST: CPT | Mod: 91

## 2024-08-13 PROCEDURE — 97162 PT EVAL MOD COMPLEX 30 MIN: CPT

## 2024-08-13 PROCEDURE — 99239 HOSP IP/OBS DSCHRG MGMT >30: CPT | Performed by: STUDENT IN AN ORGANIZED HEALTH CARE EDUCATION/TRAINING PROGRAM

## 2024-08-13 PROCEDURE — 85007 BL SMEAR W/DIFF WBC COUNT: CPT

## 2024-08-13 RX ORDER — AZITHROMYCIN 500 MG/1
500 TABLET, FILM COATED ORAL DAILY
Qty: 2 TABLET | Refills: 0 | Status: ACTIVE | OUTPATIENT
Start: 2024-08-13 | End: 2024-08-15

## 2024-08-13 RX ORDER — AZITHROMYCIN 250 MG/1
500 TABLET, FILM COATED ORAL DAILY
Status: DISCONTINUED | OUTPATIENT
Start: 2024-08-13 | End: 2024-08-13 | Stop reason: HOSPADM

## 2024-08-13 RX ADMIN — INSULIN HUMAN 5 UNITS: 100 INJECTION, SOLUTION PARENTERAL at 11:43

## 2024-08-13 RX ADMIN — ASPIRIN 81 MG: 81 TABLET, CHEWABLE ORAL at 05:55

## 2024-08-13 RX ADMIN — SERTRALINE 150 MG: 100 TABLET, FILM COATED ORAL at 05:54

## 2024-08-13 RX ADMIN — DIVALPROEX SODIUM 500 MG: 500 TABLET, EXTENDED RELEASE ORAL at 05:55

## 2024-08-13 RX ADMIN — MONTELUKAST 10 MG: 10 TABLET, FILM COATED ORAL at 05:55

## 2024-08-13 RX ADMIN — INSULIN HUMAN 2 UNITS: 100 INJECTION, SOLUTION PARENTERAL at 07:27

## 2024-08-13 RX ADMIN — LEVOTHYROXINE SODIUM 225 MCG: 0.2 TABLET ORAL at 05:55

## 2024-08-13 RX ADMIN — LORATADINE 10 MG: 10 TABLET ORAL at 05:55

## 2024-08-13 RX ADMIN — FENOFIBRATE 134 MG: 134 CAPSULE ORAL at 05:54

## 2024-08-13 ASSESSMENT — COGNITIVE AND FUNCTIONAL STATUS - GENERAL
CLIMB 3 TO 5 STEPS WITH RAILING: A LITTLE
SUGGESTED CMS G CODE MODIFIER MOBILITY: CJ
MOBILITY SCORE: 20
MOVING TO AND FROM BED TO CHAIR: A LITTLE
STANDING UP FROM CHAIR USING ARMS: A LITTLE
WALKING IN HOSPITAL ROOM: A LITTLE

## 2024-08-13 ASSESSMENT — PAIN DESCRIPTION - PAIN TYPE
TYPE: ACUTE PAIN

## 2024-08-13 ASSESSMENT — GAIT ASSESSMENTS
GAIT LEVEL OF ASSIST: SUPERVISED
DEVIATION: INCREASED BASE OF SUPPORT;DECREASED HEEL STRIKE;DECREASED TOE OFF
DISTANCE (FEET): 400

## 2024-08-13 NOTE — CARE PLAN
The patient is Stable - Low risk of patient condition declining or worsening    Shift Goals  Clinical Goals: Pt to be weaned from oxygen as tolerated.  Patient Goals: Sleep  Family Goals: JUANCARLOS    Progress made toward(s) clinical / shift goals:    Pt weaned from 2L NC to 0.5L NC. SpO2 at 98%. Pt declines SOB. Pt able to sleep with minimal interruptions. Pt declines pain.    Problem: Knowledge Deficit - Standard  Goal: Patient and family/care givers will demonstrate understanding of plan of care, disease process/condition, diagnostic tests and medications  Outcome: Progressing     Problem: Respiratory  Goal: Patient will achieve/maintain optimum respiratory ventilation and gas exchange  Outcome: Progressing       Patient is not progressing towards the following goals:

## 2024-08-13 NOTE — RESPIRATORY CARE
COPD EDUCATION by COPD CLINICAL EDUCATOR  8/13/2024 at 7:30 AM by Ashlie Zarco RRT     Patient reviewed by COPD education team. Patient does not have a history or diagnosis of COPD and is a non-smoker.  Therefore, patient does not qualify for the COPD program.

## 2024-08-13 NOTE — PROGRESS NOTES
Patient being discharged via [facility]. Pt educated on discharge instructions and new prescriptions, verbalized understanding. Follow up appointment to be made with PCP. PIV removed on floor. Patient going home via bus.

## 2024-08-13 NOTE — DISCHARGE SUMMARY
Discharge Summary    CHIEF COMPLAINT ON ADMISSION  Chief Complaint   Patient presents with    Syncope     Patient reports he was dizzy and lightheaded while at the bank today and had a syncopal episode. No head strike. No blood thinners. Denies CP/SOB       Reason for Admission  ems     Admission Date  8/12/2024    CODE STATUS  Full Code    HPI & HOSPITAL COURSE  Norm Prabhakar is a 44 y.o. male with a complex past medical history of type I DM, asthma, seizure disorder, conversion disorder, PTSD, hypothyroidism s/p thyroidectomy, poor medication compliance who presented 8/12/2024 with symptoms of dizziness and syncope while at a local bank prior to admission. He denies any previous symptoms, states he felt well this AM. Was recently discharged on 8/10/24 after a 5 day stay for hyperglycemia and unilateral weakness.   On examination he states he feels well now. Denies chest pain, SOB, dizziness. States he has been compliant with his medications.   On evaluation in the ER he was noted to be hypoxic at 86%, chest xray noted possible RUL infiltrate. His BP was soft 84/53. He was treated with IV antibiotics for PNA and given IVF. CT head to rule out injury given syncope, this was negative.   He was quickly weaned off oxygen, his procalcitonin was negative and he was given course of azithromycin. He yasmin well, his blood pressure was stable and he was cleared for discharge.       Therefore, he is discharged in fair and stable condition to home with organized home healthcare and close outpatient follow-up.    The patient recovered much more quickly than anticipated on admission.    Discharge Date  8/13/2024    FOLLOW UP ITEMS POST DISCHARGE  DM management   Chronic medical conditions     DISCHARGE DIAGNOSES  Principal Problem:    Acute hypoxic respiratory failure (HCC) (POA: Yes)  Active Problems:    Bradycardia (POA: Yes)    Postoperative hypothyroidism (POA: Yes)    Syncope (POA: Yes)    Seizure (HCC) (POA: Yes)     Uncontrolled type 2 diabetes mellitus with hyperglycemia (HCC) (POA: Yes)    Right upper lobe pneumonia (POA: Unknown)  Resolved Problems:    * No resolved hospital problems. *      FOLLOW UP  No future appointments.  No follow-up provider specified.    MEDICATIONS ON DISCHARGE     Medication List        START taking these medications        Instructions   azithromycin 500 MG tablet  Commonly known as: Zithromax   Take 1 Tablet by mouth every day for 2 days.  Dose: 500 mg            CONTINUE taking these medications        Instructions   albuterol 108 (90 Base) MCG/ACT Aers inhalation aerosol   Inhale 2 Puffs every four hours as needed for Shortness of Breath.  Dose: 2 Puff     aspirin 81 MG Chew chewable tablet  Commonly known as: Asa   Chew 81 mg every day.  Dose: 81 mg     atorvastatin 40 MG Tabs  Commonly known as: Lipitor   Take 40 mg by mouth every evening.  Dose: 40 mg     divalproex  MG Tb24  Commonly known as: Depakote ER   Take 1 Tablet by mouth every day.  Dose: 500 mg     Fenofibrate 200 MG Caps   Take 200 mg by mouth every day.  Dose: 200 mg     insulin lispro 100 UNIT/ML  Commonly known as: HumaLOG   Inject 2-9 Units under the skin 3 times a day before meals. If -200=2 units  201-250=3 units  251-300=5 units  301-350=6 units  351-400=8 units  >400=9 units  Dose: 2-9 Units     Lantus 100 UNIT/ML Soln  Generic drug: insulin glargine   Inject 40 Units under the skin every evening.  Dose: 40 Units     Latuda 80 MG Tabs  Generic drug: lurasidone   Take 80 mg by mouth with dinner.  Dose: 80 mg     * levothyroxine 200 MCG Tabs  Commonly known as: Synthroid   Take 200 mcg by mouth every morning on an empty stomach. Take with levothyroxine 25 mcg = 225 mcg  Dose: 200 mcg     * levothyroxine 25 MCG Tabs  Commonly known as: Synthroid   Take 25 mcg by mouth every morning on an empty stomach. Take  with levothyroxine 200 mcg = 225 mcg  Dose: 25 mcg     loratadine 10 MG Tabs  Commonly known as:  "Claritin   Take 10 mg by mouth every day.  Dose: 10 mg     metformin 1000 MG tablet  Commonly known as: Glucophage   Take 2,000 mg by mouth every day. 1000 mg x 2 tablets = 2000 mg  Dose: 2,000 mg     montelukast 10 MG Tabs  Commonly known as: Singulair   Take 10 mg by mouth every day.  Dose: 10 mg     prazosin 5 MG Caps  Commonly known as: Minipress   Take 5 mg by mouth every evening. Indications: Frightening Dreams  Dose: 5 mg     sertraline 100 MG Tabs  Commonly known as: Zoloft   Take 150 mg by mouth every day. 100 mg x 1.5 tablets = 150 mg  Dose: 150 mg     traZODone 50 MG Tabs  Commonly known as: Desyrel   Take 50 mg by mouth at bedtime as needed for Sleep. Indications: Trouble Sleeping  Dose: 50 mg     vitamin D3 1000 Unit (25 mcg) Tabs  Commonly known as: Cholecalciferol   Take 1,000 Units by mouth at bedtime.  Dose: 1,000 Units     zonisamide 100 MG Caps  Commonly known as: Zonegran   Take 200 mg by mouth at bedtime as needed (sleep).  Dose: 200 mg           * This list has 2 medication(s) that are the same as other medications prescribed for you. Read the directions carefully, and ask your doctor or other care provider to review them with you.                  Allergies  Allergies   Allergen Reactions    Abilify      \"Feeling tired, like I don't even know whats going on around me\"    Fish      Pt reports salmon causes him to be sick to his stomach  Not listed on MAR noted 2/3/2021      Geodon [Ziprasidone Hcl] Anaphylaxis     Anaphylaxis per patient    Aripiprazole      \"I became lethargic.\"    Hydroxyzine Itching     Pt will only state \"I feel itchy when I take it    Ziprasidone        DIET  Orders Placed This Encounter   Procedures    Diet Order Diet: Consistent CHO (Diabetic)     Standing Status:   Standing     Number of Occurrences:   1     Order Specific Question:   Diet:     Answer:   Consistent CHO (Diabetic) [4]       ACTIVITY  As " tolerated.      CONSULTATIONS  NA    PROCEDURES  NA    LABORATORY  Lab Results   Component Value Date    SODIUM 137 08/13/2024    POTASSIUM 3.9 08/13/2024    CHLORIDE 100 08/13/2024    CO2 25 08/13/2024    GLUCOSE 167 (H) 08/13/2024    BUN 16 08/13/2024    CREATININE 0.83 08/13/2024    GLOMRATE 89 05/18/2023        Lab Results   Component Value Date    WBC 6.3 08/13/2024    HEMOGLOBIN 11.7 (L) 08/13/2024    HEMATOCRIT 35.5 (L) 08/13/2024    PLATELETCT 243 08/13/2024        Total time of the discharge process exceeds 32 minutes.

## 2024-08-13 NOTE — PROGRESS NOTES
Assumed care of patient at 1900. Received bedside report from day RN Esther. Patient A&Ox4, on 2L NC, declines SOB, Reporting a pain level of 0/10. Call light within reach, belongings within reach, fall precautions in place, bed in lowest position. Patient does not have any other needs at this time.     POC was discussed with patient. All questions were answered. Patient verbalized understanding.

## 2024-08-13 NOTE — PROGRESS NOTES
0800: Assumed care of patient at 0700. Received report from night shift RN. Pt is A&O x 4, weaned down to RA. Reporting a pain level of 0/10. Assessment completed and VSS. Bowel sounds are active in all 4 quadrants. Lung sounds are clear. Pt ambulated to bathroom and back with steady gait. Call light within reach and bed is locked and in lowest position. Reinforced the need to call for assistance. Plan of care discussed and patient does not have any further needs at this time.

## 2024-08-13 NOTE — DISCHARGE PLANNING
Case Management Discharge Planning    Admission Date: 8/12/2024  GMLOS: 4.1  ALOS: 1    6-Clicks ADL Score: 22  6-Clicks Mobility Score: 20      Anticipated Discharge Dispo: Discharge Disposition: Discharged to home/self care (01)    DME Needed: No    Action(s) Taken: OTHER    Per MD, patient is medically cleared. GABY met with patient and issued [facility] Choice. Patient requested referral is sent to [facility] for Resumption of Care, choice form faxed to [facility].    Received request to assist with Meds To Bed. GABY faxed Approved Services Form to [phone].    Escalations Completed: None    Medically Clear: Yes    Next Steps: Home    Barriers to Discharge: None    Is the patient up for discharge tomorrow:

## 2024-08-13 NOTE — THERAPY
Physical Therapy   Initial Evaluation     Patient Name: Norm Prabhakar  Age:  44 y.o., Sex:  male  Medical Record #: [id number]  Today's Date: 8/13/2024     Precautions  Precautions: (P) Fall Risk    Assessment  Patient is 44 y.o. male who had a syncopal episode at the [facility].  with a diagnosis of acute hypoxic respiratory failure and hypotension   Additional factors influencing patient status / progress complex past medical history of type I DM, asthma, seizure disorder, conversion disorder, PTSD, hypothyroidism s/p thyroidectomy, poor medication compliance .      Pt demonstrating the ability to safely and timely ambulate for functional home distances. No c/o dizziness or SOB. No skilled inpatient PT indicated, no DME or post acute PT needs     Patient will not be actively followed for physical therapy services at this time, however may be seen if requested by physician for 1 more visit within 30 days to address any discharge or equipment needs.      Plan    Physical Therapy Initial Treatment Plan   Duration: (P) Discharge Needs Only    DC Equipment Recommendations: (P) None  Discharge Recommendations: (P) Anticipate that the patient will have no further physical therapy needs after discharge from the hospital         Precautions   Precautions Fall Risk   Prior Living Situation   Prior Services None   Housing / Facility 1 Story Apartment / Condo   Steps Into Home 0   Steps In Home 0   Bathroom Set up Walk In Shower   Equipment Owned Single Point Cane   Lives with - Patient's Self Care Capacity Alone and Able to Care For Self   Comments resides alone in a ground level apartment   Prior Level of Functional Mobility   Bed Mobility Independent   Transfer Status Independent   Ambulation Independent   Ambulation Distance community   Assistive Devices Used Single Point Cane   Stairs Independent   Comments RTC for transportation.   History of Falls   Date of Last Fall   (post fall at [facility].)   Active ROM Upper Body   Active  ROM Upper Body  WDL   Strength Upper Body   Upper Body Strength  WDL   Active ROM Lower Body    Active ROM Lower Body  WDL   Strength Lower Body   Lower Body Strength  WDL   Balance Assessment   Sitting Balance (Static) Good   Sitting Balance (Dynamic) Good   Standing Balance (Static) Good   Standing Balance (Dynamic) Good   Weight Shift Sitting Good   Weight Shift Standing Good   Comments no AD   Bed Mobility    Supine to Sit Independent   Sit to Supine Independent   Gait Analysis   Gait Level Of Assist Supervised  (for direction on unit)   Assistive Device None   Distance (Feet) 400   # of Times Distance was Traveled 1   Deviation Increased Base Of Support;Decreased Heel Strike;Decreased Toe Off   Weight Bearing Status no restrictions.   Comments wide based gait with mild lateral listing, No LOB   Functional Mobility   Sit to Stand Independent   Bed, Chair, Wheelchair Transfer Independent   Mobility no AD   6 Clicks Assessment - How much HELP from another person do you currently need... (If the patient hasn't done an activity recently, how much help from another person do you think he/she would need if he/she tried?)   Turning from your back to your side while in a flat bed without using bedrails? 4   Moving from lying on your back to sitting on the side of a flat bed without using bedrails? 4   Moving to and from a bed to a chair (including a wheelchair)? 3   Standing up from a chair using your arms (e.g., wheelchair, or bedside chair)? 3   Walking in hospital room? 3   Climbing 3-5 steps with a railing? 3   6 clicks Mobility Score 20   Education Group   Role of Physical Therapist Patient Response Patient;Acceptance;Explanation;Verbal Demonstration   Physical Therapy Initial Treatment Plan    Duration Discharge Needs Only   Anticipated Discharge Equipment and Recommendations   DC Equipment Recommendations None   Discharge Recommendations Anticipate that the patient will have no further physical therapy needs  after discharge from the hospital   Interdisciplinary Plan of Care Collaboration   IDT Collaboration with  Nursing   Patient Position at End of Therapy In Bed;Call Light within Reach;Tray Table within Reach;Phone within Reach   Collaboration Comments RN updated No further skilled inpatient PT needs, No DME or post acute PT needs

## 2024-08-15 NOTE — DOCUMENTATION QUERY
[facility]                                                                       Query Response Note      PATIENT:               HOLLY KIM  ACCT #:                  [id number]  MRN:                     [id number]  :                      1980  ADMIT DATE:       2024 10:54 AM  DISCH DATE:        2024 12:54 PM  RESPONDING  PROVIDER #:        [id number]           QUERY TEXT:    Acute hypoxic respiratory failure is documented in the Medical Record.  Per H&P, pt is negative for respiratory distress and shortness of breath with normal breath sounds.  After further study, can the diagnosis of acute respiratory failure be clarified with supportive clinical indicators?    The patient's Clinical Indicators include:  H&P: Acute hypoxic respiratory failure: syncopal event today, found to be hypoxic with O2 sat 86%, requiring 2L oxygen  Respiratory: positive for cough. negative for shortness of breath  Pulmonary: no respiratory distress.  Normal breath sounds, no wheezing or rales  Vitals Flowsheet:  SpO2 92% on RA, 91% on 2L, RR 14-20;  SpO2 98% on 2L , 98% on RA, RR 16-18  Risk Factors: pneumonia  Treatment: supplemental O2, CXR    Important Note:  please add your response to this query in your progress note if you agree    Thank you,  Talha Gross RN, BSN, CCDS  Clinical   Connect via File[employer] Document Management software  Options provided:   -- Acute hypoxic respiratory failure is ruled out, hypoxia is ruled in   -- Acute hypoxic respiratory failure confirmed (please clarify additional supportive clinical indicators), (please document additional clinical indicators)   -- Other explanation, (please specify other explanation)      Query created by: Talha Gross on 2024 7:13 AM    RESPONSE TEXT:    Acute hypoxic respiratory failure is ruled out, hypoxia is ruled in          Electronically signed by:  SUSANA GUZMAN  8/15/2024 7:47 AM

## 2024-08-27 NOTE — DISCHARGE PLANNING
ATTN: Case Management  RE: Referral for Home Health    As of 8/13/24, we have accepted the Home Health referral for the patient listed above.    A [facility] clinician will be out to see the patient within 48 hours. If you have any questions or concerns regarding the patient’s transition to Home Health, please do not hesitate to contact us at [phone].      We look forward to collaborating with you,  [facility] Team   PEDS 23-Aug-2024

## 2024-09-18 NOTE — DISCHARGE SUMMARY
Discharge Summary    CHIEF COMPLAINT ON ADMISSION  Chief Complaint   Patient presents with    Unilateral Weakness     Pt reports right sided weakness, began around noon today. Hx stroke with R sided deficits. States he normally is weak at baseline but began much worse today.     High Blood Sugar     Pt reports he has not had his insulin or metformin for 2 days.  per EMS       Reason for Admission  ems     Admission Date  8/5/2024    CODE STATUS  Prior    HPI & HOSPITAL COURSE  Norm Prabhakar is a 44 y.o. male with pmh of uncontrolled diabetes type 2, hypothyroidism who presented to the ED on 8/5/2024 with complaints of right-sided weakness. Patient reports a history of right-sided weakness due to previous stroke, but says that he has been unable to move his right arm today. He says he was cleaning his apartment today when his symptoms occurred. He denies any trauma to this arm, denies any changes to other extremities, denies changes in speech, swallowing. He denies dizziness, lightheadedness, shortness of breath, chest pain or palpitations. Denies recent cold or flulike symptoms. He says he has not taken any of his medications, including his diabetic medication the last couple days, as he was visiting with a friend and drinking some wine and did not want to mix alcohol with his medications.     ERP discussed case with neurologist, Dr. Garcia, who recommends no further stroke workup, as this patient has had multiple workup in the past and is well known to neurology service and has never had findings of acute stroke. Patient apparently has had at least 20 or so CTA's of head and neck and MRI brain in the past eight years at multiple institutions, per Dr. Garcia.   A nurse evaluation, patient was using his phone in his right hand, and using his right arm. However, he put this down on her entry to the room, and said he was unable to move his arm.      CBC significant for hemoglobin 12, but otherwise unremarkable. CMP  shows sodium 132, glucose 351, BUN 26, otherwise unremarkable. A1c 10.3, troponin 24. EKG with no acute ST-T changes. CT head, CTA head and neck, show no acute findings under similar to prior. Chest x-ray with no acute findings.EKG with no acute STD changes.     The patient states that is unable to move his right side of his body.  This complaint of right-sided weakness is inconsistent when he complains about it as well as on exam.  MRI of the brain unremarkable for acute CVA.  Psychiatry consulted for possible conversion disorder.  Neurology has signed off.   Discussed with Dr. Jung, she notes he previously was on multiple AED's, not on any now,   Psychiatry following, increased sertraline, stopped buspar, psychotherapy consult placed. Continuing latuda, prazosin.   Continued PT/OT while inpatient. Psych following.  He is agreeable to rehab when found, discharge planners working on it.   No seizures on EEG. Discussed with patient, he doesn't take his depakote anymore because he ran out and didn't fill it. He would like to restart, restarted. OT recommended group home, CM discussed with patient and he stated he lived in one before and does not want to go back, understands SNF's are not accepting him and prefers to go home with Home Health. Will arrange and subsequently discharged with     Therefore, he is discharged in fair and stable condition to home with organized home healthcare and close outpatient follow-up.    The patient met 2-midnight criteria for an inpatient stay at the time of discharge.    Discharge Date  8/10/2024    FOLLOW UP ITEMS POST DISCHARGE  PCP, neurology stroke clinic pt/ot    DISCHARGE DIAGNOSES  Principal Problem:    Right sided weakness (POA: Yes)  Active Problems:    Asthma (POA: Yes)    Anxiety and depression (POA: Yes)    Other hyperlipidemia (POA: Yes)    Hypothyroidism (POA: Yes)    Uncontrolled type 2 diabetes mellitus with hyperglycemia (HCC) (POA: Yes)  Resolved  Problems:    * No resolved hospital problems. *      FOLLOW UP  No future appointments.  [facility]  [id number] [facility]  [place] [id number]  [phone]          MEDICATIONS ON DISCHARGE     Medication List        START taking these medications        Instructions   divalproex  MG Tb24  Commonly known as: Depakote ER   Take 1 Tablet by mouth every day.  Dose: 500 mg            CONTINUE taking these medications        Instructions   albuterol 108 (90 Base) MCG/ACT Aers inhalation aerosol   Inhale 2 Puffs every four hours as needed for Shortness of Breath.  Dose: 2 Puff     aspirin 81 MG Chew chewable tablet  Commonly known as: Asa   Chew 81 mg every day.  Dose: 81 mg     atorvastatin 40 MG Tabs  Commonly known as: Lipitor   Take 40 mg by mouth every evening.  Dose: 40 mg     Fenofibrate 200 MG Caps   Take 200 mg by mouth every day.  Dose: 200 mg     insulin lispro 100 UNIT/ML  Commonly known as: HumaLOG,AdmeLOG   Inject 2-9 Units under the skin 3 times a day before meals. If -200=2 units  201-250=3 units  251-300=5 units  301-350=6 units  351-400=8 units  >400=9 units  Dose: 2-9 Units     Lantus 100 UNIT/ML Soln  Generic drug: insulin glargine   Inject 40 Units under the skin every evening.  Dose: 40 Units     Latuda 80 MG Tabs  Generic drug: lurasidone   Take 80 mg by mouth with dinner.  Dose: 80 mg     * levothyroxine 200 MCG Tabs  Commonly known as: Synthroid   Take 200 mcg by mouth every morning on an empty stomach. Take with levothyroxine 25 mcg = 225 mcg  Dose: 200 mcg     * levothyroxine 25 MCG Tabs  Commonly known as: Synthroid   Take 25 mcg by mouth every morning on an empty stomach. Take  with levothyroxine 200 mcg = 225 mcg  Dose: 25 mcg     loratadine 10 MG Tabs  Commonly known as: Claritin   Take 10 mg by mouth every day.  Dose: 10 mg     metformin 1000 MG tablet  Commonly known as: Glucophage   Take 2,000 mg by mouth every day. 1000 mg x 2 tablets = 2000 mg  Dose: 2,000  "mg     montelukast 10 MG Tabs  Commonly known as: Singulair   Take 10 mg by mouth every day.  Dose: 10 mg     prazosin 5 MG Caps  Commonly known as: Minipress   Take 5 mg by mouth every evening. Indications: Frightening Dreams  Dose: 5 mg     sertraline 100 MG Tabs  Commonly known as: Zoloft   Take 150 mg by mouth every day. 100 mg x 1.5 tablets = 150 mg  Dose: 150 mg     traZODone 50 MG Tabs  Commonly known as: Desyrel   Take 50 mg by mouth at bedtime as needed for Sleep. Indications: Trouble Sleeping  Dose: 50 mg     vitamin D3 1000 Unit (25 mcg) Tabs  Commonly known as: Cholecalciferol   Take 1,000 Units by mouth at bedtime.  Dose: 1,000 Units     zonisamide 100 MG Caps  Commonly known as: Zonegran   Take 200 mg by mouth at bedtime as needed (sleep).  Dose: 200 mg           * This list has 2 medication(s) that are the same as other medications prescribed for you. Read the directions carefully, and ask your doctor or other care provider to review them with you.                  Allergies  Allergies   Allergen Reactions    Abilify      \"Feeling tired, like I don't even know whats going on around me\"    Fish      Pt reports salmon causes him to be sick to his stomach  Not listed on MAR noted 2/3/2021      Geodon [Ziprasidone Hcl] Anaphylaxis     Anaphylaxis per patient    Aripiprazole      \"I became lethargic.\"    Hydroxyzine Itching     Pt will only state \"I feel itchy when I take it    Ziprasidone        DIET  No orders of the defined types were placed in this encounter.      ACTIVITY  As tolerated.  Weight bearing as tolerated    CONSULTATIONS  Psychiatry    PROCEDURES  EEG    LABORATORY  Lab Results   Component Value Date    SODIUM 137 08/13/2024    POTASSIUM 3.9 08/13/2024    CHLORIDE 100 08/13/2024    CO2 25 08/13/2024    GLUCOSE 167 (H) 08/13/2024    BUN 16 08/13/2024    CREATININE 0.83 08/13/2024    GLOMRATE 89 05/18/2023        Lab Results   Component Value Date    WBC 6.3 08/13/2024    HEMOGLOBIN 11.7 (L) " 08/13/2024    HEMATOCRIT 35.5 (L) 08/13/2024    PLATELETCT 243 08/13/2024        Total time of the discharge process exceeds 35 minutes.

## 2024-11-27 NOTE — PROGRESS NOTES
Received shift report and assumed care of patient.  Patient awake, calm and stable, currently positioned in bed for comfort and safety; call light within reach.  Denies pain or discomfort at this time.  Will continue to monitor.   show

## 2025-03-15 ENCOUNTER — HOSPITAL ENCOUNTER (EMERGENCY)
Facility: MEDICAL CENTER | Age: 45
End: 2025-03-16
Attending: STUDENT IN AN ORGANIZED HEALTH CARE EDUCATION/TRAINING PROGRAM
Payer: MEDICAID

## 2025-03-15 DIAGNOSIS — E86.0 DEHYDRATION: ICD-10-CM

## 2025-03-15 DIAGNOSIS — R73.9 HYPERGLYCEMIA: ICD-10-CM

## 2025-03-15 LAB
ALBUMIN SERPL BCP-MCNC: 4.2 G/DL (ref 3.2–4.9)
ALBUMIN/GLOB SERPL: 1.6 G/DL
ALP SERPL-CCNC: 93 U/L (ref 30–99)
ALT SERPL-CCNC: 10 U/L (ref 2–50)
ANION GAP SERPL CALC-SCNC: 13 MMOL/L (ref 7–16)
AST SERPL-CCNC: 20 U/L (ref 12–45)
BASOPHILS # BLD AUTO: 0.9 % (ref 0–1.8)
BASOPHILS # BLD: 0.11 K/UL (ref 0–0.12)
BILIRUB SERPL-MCNC: 0.4 MG/DL (ref 0.1–1.5)
BUN SERPL-MCNC: 7 MG/DL (ref 8–22)
CALCIUM ALBUM COR SERPL-MCNC: 9.1 MG/DL (ref 8.5–10.5)
CALCIUM SERPL-MCNC: 9.3 MG/DL (ref 8.5–10.5)
CHLORIDE SERPL-SCNC: 94 MMOL/L (ref 96–112)
CO2 SERPL-SCNC: 21 MMOL/L (ref 20–33)
CREAT SERPL-MCNC: 0.78 MG/DL (ref 0.5–1.4)
EOSINOPHIL # BLD AUTO: 0.11 K/UL (ref 0–0.51)
EOSINOPHIL NFR BLD: 0.9 % (ref 0–6.9)
ERYTHROCYTE [DISTWIDTH] IN BLOOD BY AUTOMATED COUNT: 41.1 FL (ref 35.9–50)
GFR SERPLBLD CREATININE-BSD FMLA CKD-EPI: 114 ML/MIN/1.73 M 2
GLOBULIN SER CALC-MCNC: 2.7 G/DL (ref 1.9–3.5)
GLUCOSE BLD STRIP.AUTO-MCNC: 189 MG/DL (ref 65–99)
GLUCOSE SERPL-MCNC: 173 MG/DL (ref 65–99)
HCT VFR BLD AUTO: 38.3 % (ref 42–52)
HGB BLD-MCNC: 13.9 G/DL (ref 14–18)
LYMPHOCYTES # BLD AUTO: 2.56 K/UL (ref 1–4.8)
LYMPHOCYTES NFR BLD: 21.9 % (ref 22–41)
MANUAL DIFF BLD: NORMAL
MCH RBC QN AUTO: 31 PG (ref 27–33)
MCHC RBC AUTO-ENTMCNC: 36.3 G/DL (ref 32.3–36.5)
MCV RBC AUTO: 85.3 FL (ref 81.4–97.8)
MICROCYTES BLD QL SMEAR: ABNORMAL
MONOCYTES # BLD AUTO: 0.51 K/UL (ref 0–0.85)
MONOCYTES NFR BLD AUTO: 4.4 % (ref 0–13.4)
MORPHOLOGY BLD-IMP: NORMAL
NEUTROPHILS # BLD AUTO: 8.41 K/UL (ref 1.82–7.42)
NEUTROPHILS NFR BLD: 71.9 % (ref 44–72)
NRBC # BLD AUTO: 0 K/UL
NRBC BLD-RTO: 0 /100 WBC (ref 0–0.2)
PLATELET # BLD AUTO: 384 K/UL (ref 164–446)
PLATELET BLD QL SMEAR: NORMAL
PMV BLD AUTO: 10.2 FL (ref 9–12.9)
POTASSIUM SERPL-SCNC: 3.3 MMOL/L (ref 3.6–5.5)
PROT SERPL-MCNC: 6.9 G/DL (ref 6–8.2)
RBC # BLD AUTO: 4.49 M/UL (ref 4.7–6.1)
RBC BLD AUTO: PRESENT
SODIUM SERPL-SCNC: 128 MMOL/L (ref 135–145)
WBC # BLD AUTO: 11.7 K/UL (ref 4.8–10.8)

## 2025-03-15 PROCEDURE — 80053 COMPREHEN METABOLIC PANEL: CPT

## 2025-03-15 PROCEDURE — 700111 HCHG RX REV CODE 636 W/ 250 OVERRIDE (IP): Mod: JZ,UD | Performed by: STUDENT IN AN ORGANIZED HEALTH CARE EDUCATION/TRAINING PROGRAM

## 2025-03-15 PROCEDURE — 36415 COLL VENOUS BLD VENIPUNCTURE: CPT

## 2025-03-15 PROCEDURE — 85007 BL SMEAR W/DIFF WBC COUNT: CPT

## 2025-03-15 PROCEDURE — 96374 THER/PROPH/DIAG INJ IV PUSH: CPT

## 2025-03-15 PROCEDURE — 700105 HCHG RX REV CODE 258: Mod: UD | Performed by: STUDENT IN AN ORGANIZED HEALTH CARE EDUCATION/TRAINING PROGRAM

## 2025-03-15 PROCEDURE — A9270 NON-COVERED ITEM OR SERVICE: HCPCS | Mod: JZ,UD | Performed by: STUDENT IN AN ORGANIZED HEALTH CARE EDUCATION/TRAINING PROGRAM

## 2025-03-15 PROCEDURE — 700102 HCHG RX REV CODE 250 W/ 637 OVERRIDE(OP): Mod: JZ,UD | Performed by: STUDENT IN AN ORGANIZED HEALTH CARE EDUCATION/TRAINING PROGRAM

## 2025-03-15 PROCEDURE — 99284 EMERGENCY DEPT VISIT MOD MDM: CPT

## 2025-03-15 PROCEDURE — 96375 TX/PRO/DX INJ NEW DRUG ADDON: CPT

## 2025-03-15 PROCEDURE — 82962 GLUCOSE BLOOD TEST: CPT

## 2025-03-15 PROCEDURE — 85027 COMPLETE CBC AUTOMATED: CPT

## 2025-03-15 RX ORDER — SODIUM CHLORIDE, SODIUM LACTATE, POTASSIUM CHLORIDE, CALCIUM CHLORIDE 600; 310; 30; 20 MG/100ML; MG/100ML; MG/100ML; MG/100ML
1000 INJECTION, SOLUTION INTRAVENOUS ONCE
Status: DISCONTINUED | OUTPATIENT
Start: 2025-03-15 | End: 2025-03-15

## 2025-03-15 RX ORDER — DIPHENHYDRAMINE HYDROCHLORIDE 50 MG/ML
25 INJECTION, SOLUTION INTRAMUSCULAR; INTRAVENOUS ONCE
Status: COMPLETED | OUTPATIENT
Start: 2025-03-15 | End: 2025-03-15

## 2025-03-15 RX ORDER — SODIUM CHLORIDE 9 MG/ML
1000 INJECTION, SOLUTION INTRAVENOUS ONCE
Status: COMPLETED | OUTPATIENT
Start: 2025-03-15 | End: 2025-03-16

## 2025-03-15 RX ORDER — POTASSIUM CHLORIDE 1500 MG/1
40 TABLET, EXTENDED RELEASE ORAL ONCE
Status: COMPLETED | OUTPATIENT
Start: 2025-03-15 | End: 2025-03-15

## 2025-03-15 RX ORDER — PROCHLORPERAZINE EDISYLATE 5 MG/ML
10 INJECTION INTRAMUSCULAR; INTRAVENOUS ONCE
Status: COMPLETED | OUTPATIENT
Start: 2025-03-15 | End: 2025-03-15

## 2025-03-15 RX ADMIN — POTASSIUM CHLORIDE 40 MEQ: 1500 TABLET, EXTENDED RELEASE ORAL at 22:56

## 2025-03-15 RX ADMIN — PROCHLORPERAZINE EDISYLATE 10 MG: 5 INJECTION INTRAMUSCULAR; INTRAVENOUS at 22:56

## 2025-03-15 RX ADMIN — SODIUM CHLORIDE 1000 ML: 9 INJECTION, SOLUTION INTRAVENOUS at 22:56

## 2025-03-15 RX ADMIN — DIPHENHYDRAMINE HYDROCHLORIDE 25 MG: 50 INJECTION, SOLUTION INTRAMUSCULAR; INTRAVENOUS at 22:56

## 2025-03-15 ASSESSMENT — FIBROSIS 4 INDEX: FIB4 SCORE: 0.81

## 2025-03-16 ENCOUNTER — APPOINTMENT (OUTPATIENT)
Dept: RADIOLOGY | Facility: MEDICAL CENTER | Age: 45
End: 2025-03-16
Attending: STUDENT IN AN ORGANIZED HEALTH CARE EDUCATION/TRAINING PROGRAM
Payer: MEDICAID

## 2025-03-16 VITALS
HEART RATE: 44 BPM | DIASTOLIC BLOOD PRESSURE: 66 MMHG | HEIGHT: 78 IN | TEMPERATURE: 97.8 F | WEIGHT: 258 LBS | BODY MASS INDEX: 29.85 KG/M2 | RESPIRATION RATE: 19 BRPM | OXYGEN SATURATION: 95 % | SYSTOLIC BLOOD PRESSURE: 123 MMHG

## 2025-03-16 LAB
APPEARANCE UR: CLEAR
BILIRUB UR QL STRIP.AUTO: NEGATIVE
COLOR UR: YELLOW
GLUCOSE UR STRIP.AUTO-MCNC: >=1000 MG/DL
KETONES UR STRIP.AUTO-MCNC: ABNORMAL MG/DL
LEUKOCYTE ESTERASE UR QL STRIP.AUTO: NEGATIVE
MICRO URNS: ABNORMAL
NITRITE UR QL STRIP.AUTO: NEGATIVE
PH UR STRIP.AUTO: 5.5 [PH] (ref 5–8)
PROT UR QL STRIP: NEGATIVE MG/DL
RBC UR QL AUTO: NEGATIVE
SP GR UR STRIP.AUTO: 1.02
UROBILINOGEN UR STRIP.AUTO-MCNC: 1 EU/DL

## 2025-03-16 PROCEDURE — 700111 HCHG RX REV CODE 636 W/ 250 OVERRIDE (IP): Mod: JZ,UD | Performed by: STUDENT IN AN ORGANIZED HEALTH CARE EDUCATION/TRAINING PROGRAM

## 2025-03-16 PROCEDURE — 700105 HCHG RX REV CODE 258: Mod: UD | Performed by: STUDENT IN AN ORGANIZED HEALTH CARE EDUCATION/TRAINING PROGRAM

## 2025-03-16 PROCEDURE — 81003 URINALYSIS AUTO W/O SCOPE: CPT

## 2025-03-16 PROCEDURE — 96375 TX/PRO/DX INJ NEW DRUG ADDON: CPT

## 2025-03-16 PROCEDURE — 70450 CT HEAD/BRAIN W/O DYE: CPT

## 2025-03-16 RX ORDER — ACETAMINOPHEN 10 MG/ML
1000 INJECTION, SOLUTION INTRAVENOUS ONCE
Status: COMPLETED | OUTPATIENT
Start: 2025-03-16 | End: 2025-03-16

## 2025-03-16 RX ORDER — SODIUM CHLORIDE, SODIUM LACTATE, POTASSIUM CHLORIDE, CALCIUM CHLORIDE 600; 310; 30; 20 MG/100ML; MG/100ML; MG/100ML; MG/100ML
1000 INJECTION, SOLUTION INTRAVENOUS ONCE
Status: DISCONTINUED | OUTPATIENT
Start: 2025-03-16 | End: 2025-03-16

## 2025-03-16 RX ORDER — SODIUM CHLORIDE 9 MG/ML
1000 INJECTION, SOLUTION INTRAVENOUS ONCE
Status: COMPLETED | OUTPATIENT
Start: 2025-03-16 | End: 2025-03-16

## 2025-03-16 RX ADMIN — SODIUM CHLORIDE 1000 ML: 9 INJECTION, SOLUTION INTRAVENOUS at 01:49

## 2025-03-16 RX ADMIN — ACETAMINOPHEN 1000 MG: 10 INJECTION, SOLUTION INTRAVENOUS at 01:20

## 2025-03-16 NOTE — ED NOTES
800-1000 Calories daily TOTAL.  More than 50% of the diet should be plant based (vegetables more than fruit) with no processed foods.  Limit meat products.  If you eat meat- opt for Fish/Chicken/Turkey.  Avoid Pork and Red Meat.  Opt for water- 10- 12 glasses daily- avoid sweetened beverages altogether (No Soda/Juice/Sweetened drinks- Limit Alcohol).  Avoid fast food/fried food.   Limit breads/pasta/rice/potatoes but if you must- eat whole grain or wheat versions over white bread products- this includes rice and pastas.  150 minutes of cardiovascular exercise per week and weight train two to three times per week hitting as many muscle groups as possible.  Make sure to eat at least 50-75 grams of protein daily.    Report from Hay ANNE

## 2025-03-16 NOTE — ED NOTES
Discharged in stable condition, alert and oriented, ambulatory.follow up appointment instructed. Health education imparted. Instructed to come back once symptoms worsened. Pt verbalized understanding of the information given. Removed IV line and applied pressure.

## 2025-03-16 NOTE — DISCHARGE INSTRUCTIONS
You were seen in the emergency department for elevated blood sugar and generalized weakness.  You are diagnosed with moderate to severe dehydration.  You are given IV fluids and medication to help with your headache.    Please follow-up with your primary care physician.

## 2025-03-16 NOTE — ED PROVIDER NOTES
ED Provider Note    CHIEF COMPLAINT  Chief Complaint   Patient presents with    High Blood Sugar     BIB EMS from home for high blood sugar. Patient reports BGL was 698 mg/dL. Administered 64 units of lantus (twice his normal dose) at around 1600. Sugar was 333 mg/dL after he rechecked it.  BGL at triage 198 mg/dL.  Also complains of weakness and nausea. Received PO zofran en route.        EXTERNAL RECORDS REVIEWED  Reviewed inpatient admission discharge summary 8/13/2024 admitted to the hospital for dizziness and syncope, hypoxia and hypotension    HPI/ROS  LIMITATION TO HISTORY   Select: : None  OUTSIDE HISTORIAN(S):  EMS providing clinically relevant collateral history    Norm Prabhakar is a 44 y.o. male  with a past medical history of bipolar 1 disorder, depression, insulin-dependent diabetes, seizure disorder presenting to the emergency for hyperglycemia, generalized weakness.  Patient says that his blood sugar was about 700 earlier this evening.  He took 64 units of Lantus, twice his normal dose of 32 units and subsequently called EMS because he was feeling generalized weakness and fatigue.  Patient endorses a headache, says that it is a typical headache that he gets when he is very dehydrated and hyperglycemic.  He denies any recent infectious symptoms fevers chills body aches.  He does not have any neck pain.  This is not the worst headache of his life.  He denies any wounds in his groin, back pain, flank pain, dysuria    Patient has a history of glaucoma he is blind in the left eye.    PAST MEDICAL HISTORY   has a past medical history of Anxiety, ASTHMA, Bipolar 1 disorder ([facility]), Depression, Diabetes ([facility]), Fall, Glaucoma, Glaucoma (1982), Hypothyroidism, Indigestion, Mental disorder, Murmur, Pneumonia, Psychiatric problem (2002), S/P thyroidectomy, Seizure ([facility]) (2010), Seizure disorder ([facility]), and Unspecified disorder of thyroid.    SURGICAL HISTORY   has a past surgical history that includes eye  "surgery; thyroid lobectomy; and other.    FAMILY HISTORY  Family History   Problem Relation Age of Onset    Hypertension Mother     Heart Disease Mother     Lung Disease Mother     Stroke Maternal Grandmother        SOCIAL HISTORY  Social History     Tobacco Use    Smoking status: Former     Current packs/day: 0.00     Types: Cigarettes, Cigars     Quit date: 2020     Years since quittin.9    Smokeless tobacco: Never   Vaping Use    Vaping status: Every Day    Start date: 2023    Substances: Nicotine, THC, CBD, Flavoring    Devices: Disposable, Pre-filled or refillable cartridge, Pre-filled pod   Substance and Sexual Activity    Alcohol use: Not Currently     Comment: occasionally    Drug use: Not Currently     Types: Inhaled     Comment: marijuana every few days    Sexual activity: Not Currently       CURRENT MEDICATIONS  Home Medications       Reviewed by Chitra Lester R.N. (Registered Nurse) on 03/15/25 at [id number]  Med List Status: Partial     Medication Last Dose Status   albuterol 108 (90 Base) MCG/ACT Aero Soln inhalation aerosol  Active   aspirin (ASA) 81 MG Chew Tab chewable tablet  Active   atorvastatin (LIPITOR) 40 MG Tab  Active   divalproex ER (DEPAKOTE ER) 500 MG TABLET SR 24 HR  Active   Fenofibrate 200 MG Cap  Active   insulin glargine (LANTUS) 100 UNIT/ML SC SOLN  Active   insulin lispro 100 UNIT/ML INJ  Active   levothyroxine (SYNTHROID) 200 MCG Tab  Active   levothyroxine (SYNTHROID) 25 MCG Tab  Active   loratadine (CLARITIN) 10 MG Tab  Active   lurasidone (LATUDA) 80 MG Tab  Active   metformin (GLUCOPHAGE) 1000 MG tablet  Active   montelukast (SINGULAIR) 10 MG Tab  Active   prazosin (MINIPRESS) 5 MG Cap  Active   sertraline (ZOLOFT) 100 MG Tab  Active   traZODone (DESYREL) 50 MG Tab  Active   vitamin D3 (CHOLECALCIFEROL) 1000 Unit (25 mcg) Tab  Active   zonisamide (ZONEGRAN) 100 MG Cap  Active                    ALLERGIES  Allergies   Allergen Reactions    Abilify      \"Feeling tired, " "like I don't even know whats going on around me\"    Fish      Pt reports salmon causes him to be sick to his stomach  Not listed on MAR noted 2/3/2021      Geodon [Ziprasidone Hcl] Anaphylaxis     Anaphylaxis per patient    Aripiprazole      \"I became lethargic.\"    Hydroxyzine Itching     Pt will only state \"I feel itchy when I take it    Ziprasidone        PHYSICAL EXAM  VITAL SIGNS: /66   Pulse (!) 44   Temp 36.6 °C (97.8 °F) (Temporal)   Resp 19   Ht 1.981 m (6' 6\")   Wt 117 kg (258 lb)   SpO2 95%   BMI 29.81 kg/m²    General: Well- appearing , non-toxic, no acute distress  Neuro: oriented x 3, moving all extremities.  No gross cranial nerve deficits.  No truncal ataxia.  HEENT:   - Head: Normocephalic, atraumatic  - Eyes: Glaucoma/ haziness of the cornea left eye.  Right eye is normal in appearance.  No nystagmus.  - Ears/Nose: normal external nose and ears  - Mouth: Slightly dry mucosal membranes  Resp: clear to auscultation, no increased work of breathing  CV: Regular rate and rhythm  Abd: Soft, non-tender, non-distended  Extremities: No peripheral edema.  No wounds to the arms or legs.  Psych: lucid and conversational not agitated or aggressive        DIAGNOSTIC STUDIES / PROCEDURES    LABS and ECG  Results for orders placed or performed during the hospital encounter of 03/15/25   POCT glucose device results    Collection Time: 03/15/25  7:44 PM   Result Value Ref Range    POC Glucose, Blood 189 (H) 65 - 99 mg/dL   CBC with Differential    Collection Time: 03/15/25  7:54 PM   Result Value Ref Range    WBC 11.7 (H) 4.8 - 10.8 K/uL    RBC 4.49 (L) 4.70 - 6.10 M/uL    Hemoglobin 13.9 (L) 14.0 - 18.0 g/dL    Hematocrit 38.3 (L) 42.0 - 52.0 %    MCV 85.3 81.4 - 97.8 fL    MCH 31.0 27.0 - 33.0 pg    MCHC 36.3 32.3 - 36.5 g/dL    RDW 41.1 35.9 - 50.0 fL    Platelet Count 384 164 - 446 K/uL    MPV 10.2 9.0 - 12.9 fL    Neutrophils-Polys 71.90 44.00 - 72.00 %    Lymphocytes 21.90 (L) 22.00 - 41.00 %    " Monocytes 4.40 0.00 - 13.40 %    Eosinophils 0.90 0.00 - 6.90 %    Basophils 0.90 0.00 - 1.80 %    Nucleated RBC 0.00 0.00 - 0.20 /100 WBC    Neutrophils (Absolute) 8.41 (H) 1.82 - 7.42 K/uL    Lymphs (Absolute) 2.56 1.00 - 4.80 K/uL    Monos (Absolute) 0.51 0.00 - 0.85 K/uL    Eos (Absolute) 0.11 0.00 - 0.51 K/uL    Baso (Absolute) 0.11 0.00 - 0.12 K/uL    NRBC (Absolute) 0.00 K/uL    Microcytosis 2+ (A)    Comp Metabolic Panel    Collection Time: 03/15/25  7:54 PM   Result Value Ref Range    Sodium 128 (L) 135 - 145 mmol/L    Potassium 3.3 (L) 3.6 - 5.5 mmol/L    Chloride 94 (L) 96 - 112 mmol/L    Co2 21 20 - 33 mmol/L    Anion Gap 13.0 7.0 - 16.0    Glucose 173 (H) 65 - 99 mg/dL    Bun 7 (L) 8 - 22 mg/dL    Creatinine 0.78 0.50 - 1.40 mg/dL    Calcium 9.3 8.5 - 10.5 mg/dL    Correct Calcium 9.1 8.5 - 10.5 mg/dL    AST(SGOT) 20 12 - 45 U/L    ALT(SGPT) 10 2 - 50 U/L    Alkaline Phosphatase 93 30 - 99 U/L    Total Bilirubin 0.4 0.1 - 1.5 mg/dL    Albumin 4.2 3.2 - 4.9 g/dL    Total Protein 6.9 6.0 - 8.2 g/dL    Globulin 2.7 1.9 - 3.5 g/dL    A-G Ratio 1.6 g/dL   ESTIMATED GFR    Collection Time: 03/15/25  7:54 PM   Result Value Ref Range    GFR (CKD-EPI) 114 >60 mL/min/1.73 m 2   DIFFERENTIAL MANUAL    Collection Time: 03/15/25  7:54 PM   Result Value Ref Range    Manual Diff Status PERFORMED    PERIPHERAL SMEAR REVIEW    Collection Time: 03/15/25  7:54 PM   Result Value Ref Range    Peripheral Smear Review see below    PLATELET ESTIMATE    Collection Time: 03/15/25  7:54 PM   Result Value Ref Range    Plt Estimation Normal    MORPHOLOGY    Collection Time: 03/15/25  7:54 PM   Result Value Ref Range    RBC Morphology Present    Urinalysis    Collection Time: 03/16/25  2:12 AM    Specimen: Urine   Result Value Ref Range    Color Yellow     Character Clear     Specific Gravity 1.016 <1.035    Ph 5.5 5.0 - 8.0    Glucose >=1000 (A) Negative mg/dL    Ketones Trace (A) Negative mg/dL    Protein Negative Negative mg/dL     Bilirubin Negative Negative    Urobilinogen, Urine 1.0 <=1.0 EU/dL    Nitrite Negative Negative    Leukocyte Esterase Negative Negative    Occult Blood Negative Negative    Micro Urine Req see below      *Note: Due to a large number of results and/or encounters for the requested time period, some results have not been displayed. A complete set of results can be found in Results Review.       RADIOLOGY  I have independently interpreted the diagnostic imaging associated with this visit and am waiting the final reading from the radiologist.   My preliminary interpretation is as follows:   - Reviewed noncontrast CT scan of the head which shows no intracranial pathology  Radiologist interpretation:   CT-HEAD W/O   Final Result      1.  No evidence of acute intracranial process.      2.  Stable chronic white matter disease.                       MEDICAL DECISION MAKING      ED COURSE AND PLAN    This is a 44-year-old gentleman with above medical history presenting to the emergency department for generalized weakness and hyperglycemia.  Preliminary orders were placed per triage protocol, his chemistry reveals he is mildly hyponatremic, mildly hypokalemic but he has no gap acidosis and his blood glucose is 173.  His presentation is not consistent with DKA or euglycemic ketoacidosis.  Patient was treated with IV fluid resuscitation for his dehydration.  I gave him a dose of Compazine and Benadryl to help with his headache.  I replenish his potassium with oral potassium chloride.  On reevaluation he continued to have a mild headache thus I ordered a CT scan of the head.  My suspicion of intracranial process, subarachnoid hemorrhage, CNS infection is exceedingly low.  I gave him a second liter of IV fluids and a dose of IV Tylenol.    Upon reevaluation patient was feeling much better, his weakness and fatigue had resolved with rehydration and he felt comfortable going home.  I advised him on strict return precautions,  strict adherence to his insulin, he is appropriate for discharge at this time.           Hydration: Based on the patient's presentation of Dehydration and Hyperglycemia the patient was given IV fluids. IV Hydration was used because oral hydration was not adequate alone. Upon recheck following hydration, the patient was stable.    Procedures:      ----------------------------------------------------------------------------------  DISCUSSIONS    I have discussed management of the patient with the following physicians and MANNY's:      Discussion of management with other [facility] or appropriate source(s):     Escalation of care considered, and ultimately not performed: Consider admission to the hospital for hyperglycemia, moderate to severe dehydration    Barriers to care at this time, including but not limited to:     Decision tools and prescription drugs considered including, but not limited to:     FINAL IMPRESSION    1. Hyperglycemia    2. Dehydration        Discharge Medication List as of 3/16/2025  2:56 AM          No follow-up provider specified.      DISPOSITION    Discharge home, Stable      This chart was dictated using an electronic voice recognition software. The chart has been reviewed and edited but there is still possibility for dictation errors due to limitation of software.    Rubén Morin,  3/16/2025

## 2025-03-16 NOTE — ED TRIAGE NOTES
Norm Prabhakar  44 y.o. male  Chief Complaint   Patient presents with    High Blood Sugar     BIB EMS from home for high blood sugar. Patient reports BGL was 698 mg/dL. Administered 64 units of lantus (twice his normal dose) at around 1600. Sugar was 333 mg/dL after he rechecked it.  BGL at triage 198 mg/dL.  Also complains of weakness and nausea. Received PO zofran en route.      On wheelchair to triage.    Pt is GCS 15, speaking in full sentences, follows commands and responds appropriately to questions. Resp are even and unlabored.    protocol ordered. Pt placed in phlebotomy. Pt educated on triage process. Pt encouraged to alert staff for any changes.       Vitals:    03/15/25 1924   BP: 123/79   Pulse: 76   Resp: 16   Temp: 36.3 °C (97.3 °F)   SpO2: 95%

## 2025-04-29 NOTE — ED NOTES
Medicated pt per MAR.   Re-checked pt BG, result 314. Admitting MD aware.   Regarding: [place] 89 M -  neck pain- stated he is in rough shape and hardly slept pain about a 8/10 and constant  ----- Message from Son LEYVA sent at 4/28/2025  2:31 PM CDT -----  Patient Name: Dax Vidal    Specialist or PCP Name: Sanjeev Salcido MD    Symptoms:  having neck pain- stated he is in rough shape and hardly slept last night. rated the pain about a 8/10 and constant - called about getting a referral for the pain     Pregnant (females aged 13-60. If Yes, how long?) : n/a    Call Back # : [phone]    Which State are you currently located in?: [place]    Name of Clinic Site / Acct# : [facility] - [address] - Primary Care    Call arrived during: Work Hours

## 2025-05-02 ENCOUNTER — HOSPITAL ENCOUNTER (INPATIENT)
Facility: MEDICAL CENTER | Age: 45
LOS: 9 days | DRG: 641 | End: 2025-05-12
Attending: EMERGENCY MEDICINE | Admitting: INTERNAL MEDICINE
Payer: MEDICAID

## 2025-05-02 DIAGNOSIS — E11.9 TYPE 2 DIABETES MELLITUS WITHOUT COMPLICATION, WITHOUT LONG-TERM CURRENT USE OF INSULIN (HCC): ICD-10-CM

## 2025-05-02 DIAGNOSIS — E87.1 HYPONATREMIA: ICD-10-CM

## 2025-05-02 DIAGNOSIS — E86.0 DEHYDRATION: ICD-10-CM

## 2025-05-02 DIAGNOSIS — E03.9 ACQUIRED HYPOTHYROIDISM: ICD-10-CM

## 2025-05-02 DIAGNOSIS — M62.81 MUSCLE WEAKNESS (GENERALIZED): ICD-10-CM

## 2025-05-02 PROBLEM — Z71.89 ACP (ADVANCE CARE PLANNING): Status: ACTIVE | Noted: 2025-05-02

## 2025-05-02 LAB
ALBUMIN SERPL BCP-MCNC: 4.2 G/DL (ref 3.2–4.9)
ALBUMIN/GLOB SERPL: 1.6 G/DL
ALP SERPL-CCNC: 100 U/L (ref 30–99)
ALT SERPL-CCNC: 17 U/L (ref 2–50)
ANION GAP SERPL CALC-SCNC: 13 MMOL/L (ref 7–16)
AST SERPL-CCNC: 24 U/L (ref 12–45)
BASOPHILS # BLD AUTO: 1.4 % (ref 0–1.8)
BASOPHILS # BLD: 0.12 K/UL (ref 0–0.12)
BILIRUB SERPL-MCNC: 0.8 MG/DL (ref 0.1–1.5)
BUN SERPL-MCNC: 16 MG/DL (ref 8–22)
CALCIUM ALBUM COR SERPL-MCNC: 8.7 MG/DL (ref 8.5–10.5)
CALCIUM SERPL-MCNC: 8.9 MG/DL (ref 8.5–10.5)
CHLORIDE SERPL-SCNC: 94 MMOL/L (ref 96–112)
CO2 SERPL-SCNC: 20 MMOL/L (ref 20–33)
CREAT SERPL-MCNC: 1.03 MG/DL (ref 0.5–1.4)
EKG IMPRESSION: NORMAL
EOSINOPHIL # BLD AUTO: 0.07 K/UL (ref 0–0.51)
EOSINOPHIL NFR BLD: 0.8 % (ref 0–6.9)
ERYTHROCYTE [DISTWIDTH] IN BLOOD BY AUTOMATED COUNT: 44.9 FL (ref 35.9–50)
EST. AVERAGE GLUCOSE BLD GHB EST-MCNC: 496 MG/DL
FLUAV RNA SPEC QL NAA+PROBE: NEGATIVE
FLUBV RNA SPEC QL NAA+PROBE: NEGATIVE
GFR SERPLBLD CREATININE-BSD FMLA CKD-EPI: 92 ML/MIN/1.73 M 2
GLOBULIN SER CALC-MCNC: 2.6 G/DL (ref 1.9–3.5)
GLUCOSE BLD STRIP.AUTO-MCNC: 229 MG/DL (ref 65–99)
GLUCOSE BLD STRIP.AUTO-MCNC: 268 MG/DL (ref 65–99)
GLUCOSE BLD STRIP.AUTO-MCNC: 286 MG/DL (ref 65–99)
GLUCOSE SERPL-MCNC: 293 MG/DL (ref 65–99)
HBA1C MFR BLD: 18.9 % (ref 4–5.6)
HCT VFR BLD AUTO: 37.9 % (ref 42–52)
HGB BLD-MCNC: 13.4 G/DL (ref 14–18)
IMM GRANULOCYTES # BLD AUTO: 0.06 K/UL (ref 0–0.11)
IMM GRANULOCYTES NFR BLD AUTO: 0.7 % (ref 0–0.9)
LIPASE SERPL-CCNC: 13 U/L (ref 11–82)
LYMPHOCYTES # BLD AUTO: 2.65 K/UL (ref 1–4.8)
LYMPHOCYTES NFR BLD: 30.4 % (ref 22–41)
MCH RBC QN AUTO: 30.7 PG (ref 27–33)
MCHC RBC AUTO-ENTMCNC: 35.4 G/DL (ref 32.3–36.5)
MCV RBC AUTO: 86.9 FL (ref 81.4–97.8)
MONOCYTES # BLD AUTO: 0.58 K/UL (ref 0–0.85)
MONOCYTES NFR BLD AUTO: 6.7 % (ref 0–13.4)
NEUTROPHILS # BLD AUTO: 5.23 K/UL (ref 1.82–7.42)
NEUTROPHILS NFR BLD: 60 % (ref 44–72)
NRBC # BLD AUTO: 0 K/UL
NRBC BLD-RTO: 0 /100 WBC (ref 0–0.2)
PLATELET # BLD AUTO: 310 K/UL (ref 164–446)
PMV BLD AUTO: 10.7 FL (ref 9–12.9)
POTASSIUM SERPL-SCNC: 3.7 MMOL/L (ref 3.6–5.5)
PROT SERPL-MCNC: 6.8 G/DL (ref 6–8.2)
RBC # BLD AUTO: 4.36 M/UL (ref 4.7–6.1)
RSV RNA SPEC QL NAA+PROBE: NEGATIVE
SARS-COV-2 RNA RESP QL NAA+PROBE: NOTDETECTED
SODIUM SERPL-SCNC: 127 MMOL/L (ref 135–145)
T4 FREE SERPL-MCNC: <0.11 NG/DL (ref 0.93–1.7)
TSH SERPL DL<=0.005 MIU/L-ACNC: 60 UIU/ML (ref 0.38–5.33)
WBC # BLD AUTO: 8.7 K/UL (ref 4.8–10.8)

## 2025-05-02 PROCEDURE — 80053 COMPREHEN METABOLIC PANEL: CPT

## 2025-05-02 PROCEDURE — 96372 THER/PROPH/DIAG INJ SC/IM: CPT | Mod: XU

## 2025-05-02 PROCEDURE — 83690 ASSAY OF LIPASE: CPT

## 2025-05-02 PROCEDURE — 0241U HCHG SARS-COV-2 COVID-19 NFCT DS RESP RNA 4 TRGT ED POC: CPT

## 2025-05-02 PROCEDURE — 96360 HYDRATION IV INFUSION INIT: CPT

## 2025-05-02 PROCEDURE — 36415 COLL VENOUS BLD VENIPUNCTURE: CPT

## 2025-05-02 PROCEDURE — 700102 HCHG RX REV CODE 250 W/ 637 OVERRIDE(OP): Mod: UD | Performed by: INTERNAL MEDICINE

## 2025-05-02 PROCEDURE — 93005 ELECTROCARDIOGRAM TRACING: CPT | Mod: TC | Performed by: EMERGENCY MEDICINE

## 2025-05-02 PROCEDURE — A9270 NON-COVERED ITEM OR SERVICE: HCPCS | Mod: UD | Performed by: INTERNAL MEDICINE

## 2025-05-02 PROCEDURE — 700105 HCHG RX REV CODE 258: Mod: UD | Performed by: INTERNAL MEDICINE

## 2025-05-02 PROCEDURE — G0378 HOSPITAL OBSERVATION PER HR: HCPCS

## 2025-05-02 PROCEDURE — 84439 ASSAY OF FREE THYROXINE: CPT

## 2025-05-02 PROCEDURE — 99223 1ST HOSP IP/OBS HIGH 75: CPT | Mod: 25 | Performed by: INTERNAL MEDICINE

## 2025-05-02 PROCEDURE — 83036 HEMOGLOBIN GLYCOSYLATED A1C: CPT

## 2025-05-02 PROCEDURE — 93005 ELECTROCARDIOGRAM TRACING: CPT | Mod: TC

## 2025-05-02 PROCEDURE — 84443 ASSAY THYROID STIM HORMONE: CPT

## 2025-05-02 PROCEDURE — 85025 COMPLETE CBC W/AUTO DIFF WBC: CPT

## 2025-05-02 PROCEDURE — 82962 GLUCOSE BLOOD TEST: CPT | Mod: 91

## 2025-05-02 PROCEDURE — 700111 HCHG RX REV CODE 636 W/ 250 OVERRIDE (IP): Mod: JZ,UD | Performed by: INTERNAL MEDICINE

## 2025-05-02 PROCEDURE — 99497 ADVNCD CARE PLAN 30 MIN: CPT | Performed by: INTERNAL MEDICINE

## 2025-05-02 PROCEDURE — 700105 HCHG RX REV CODE 258: Mod: UD | Performed by: EMERGENCY MEDICINE

## 2025-05-02 PROCEDURE — 99285 EMERGENCY DEPT VISIT HI MDM: CPT

## 2025-05-02 RX ORDER — DIVALPROEX SODIUM 250 MG/1
500-1500 TABLET, DELAYED RELEASE ORAL 2 TIMES DAILY
Status: DISCONTINUED | OUTPATIENT
Start: 2025-05-02 | End: 2025-05-02

## 2025-05-02 RX ORDER — FENOFIBRATE 134 MG/1
134 CAPSULE ORAL DAILY
Status: DISCONTINUED | OUTPATIENT
Start: 2025-05-03 | End: 2025-05-12 | Stop reason: HOSPADM

## 2025-05-02 RX ORDER — DIVALPROEX SODIUM 500 MG/1
500-1500 TABLET, DELAYED RELEASE ORAL 2 TIMES DAILY
COMMUNITY

## 2025-05-02 RX ORDER — SODIUM CHLORIDE 9 MG/ML
INJECTION, SOLUTION INTRAVENOUS CONTINUOUS
Status: ACTIVE | OUTPATIENT
Start: 2025-05-02 | End: 2025-05-03

## 2025-05-02 RX ORDER — PROCHLORPERAZINE EDISYLATE 5 MG/ML
5-10 INJECTION INTRAMUSCULAR; INTRAVENOUS EVERY 4 HOURS PRN
Status: DISCONTINUED | OUTPATIENT
Start: 2025-05-02 | End: 2025-05-12 | Stop reason: HOSPADM

## 2025-05-02 RX ORDER — ASPIRIN 81 MG/1
81 TABLET, CHEWABLE ORAL DAILY
Status: DISCONTINUED | OUTPATIENT
Start: 2025-05-03 | End: 2025-05-12 | Stop reason: HOSPADM

## 2025-05-02 RX ORDER — ENOXAPARIN SODIUM 100 MG/ML
40 INJECTION SUBCUTANEOUS DAILY
Status: DISCONTINUED | OUTPATIENT
Start: 2025-05-02 | End: 2025-05-12 | Stop reason: HOSPADM

## 2025-05-02 RX ORDER — NALTREXONE HYDROCHLORIDE 50 MG/1
50 TABLET, FILM COATED ORAL DAILY
Status: DISCONTINUED | OUTPATIENT
Start: 2025-05-03 | End: 2025-05-12 | Stop reason: HOSPADM

## 2025-05-02 RX ORDER — LEVOTHYROXINE SODIUM 50 UG/1
100 TABLET ORAL
Status: DISCONTINUED | OUTPATIENT
Start: 2025-05-03 | End: 2025-05-03

## 2025-05-02 RX ORDER — ONDANSETRON 4 MG/1
4 TABLET, ORALLY DISINTEGRATING ORAL EVERY 4 HOURS PRN
Status: DISCONTINUED | OUTPATIENT
Start: 2025-05-02 | End: 2025-05-12 | Stop reason: HOSPADM

## 2025-05-02 RX ORDER — DIVALPROEX SODIUM 500 MG/1
1500 TABLET, DELAYED RELEASE ORAL EVERY EVENING
Status: DISCONTINUED | OUTPATIENT
Start: 2025-05-02 | End: 2025-05-12 | Stop reason: HOSPADM

## 2025-05-02 RX ORDER — PROMETHAZINE HYDROCHLORIDE 25 MG/1
12.5-25 TABLET ORAL EVERY 4 HOURS PRN
Status: DISCONTINUED | OUTPATIENT
Start: 2025-05-02 | End: 2025-05-04

## 2025-05-02 RX ORDER — SODIUM CHLORIDE, SODIUM LACTATE, POTASSIUM CHLORIDE, CALCIUM CHLORIDE 600; 310; 30; 20 MG/100ML; MG/100ML; MG/100ML; MG/100ML
1000 INJECTION, SOLUTION INTRAVENOUS ONCE
Status: COMPLETED | OUTPATIENT
Start: 2025-05-02 | End: 2025-05-02

## 2025-05-02 RX ORDER — NALTREXONE HYDROCHLORIDE 50 MG/1
50 TABLET, FILM COATED ORAL DAILY
COMMUNITY

## 2025-05-02 RX ORDER — INSULIN ASPART 100 [IU]/ML
10-20 INJECTION, SOLUTION INTRAVENOUS; SUBCUTANEOUS
Status: ON HOLD | COMMUNITY
End: 2025-05-07

## 2025-05-02 RX ORDER — DIVALPROEX SODIUM 500 MG/1
500 TABLET, DELAYED RELEASE ORAL EVERY MORNING
Status: DISCONTINUED | OUTPATIENT
Start: 2025-05-03 | End: 2025-05-12 | Stop reason: HOSPADM

## 2025-05-02 RX ORDER — TRAZODONE HYDROCHLORIDE 100 MG/1
100 TABLET ORAL
Status: DISCONTINUED | OUTPATIENT
Start: 2025-05-02 | End: 2025-05-06

## 2025-05-02 RX ORDER — ONDANSETRON 2 MG/ML
4 INJECTION INTRAMUSCULAR; INTRAVENOUS EVERY 4 HOURS PRN
Status: DISCONTINUED | OUTPATIENT
Start: 2025-05-02 | End: 2025-05-12 | Stop reason: HOSPADM

## 2025-05-02 RX ORDER — SERTRALINE HYDROCHLORIDE 100 MG/1
200 TABLET, FILM COATED ORAL DAILY
Status: DISCONTINUED | OUTPATIENT
Start: 2025-05-03 | End: 2025-05-12 | Stop reason: HOSPADM

## 2025-05-02 RX ORDER — BUSPIRONE HYDROCHLORIDE 10 MG/1
5 TABLET ORAL 2 TIMES DAILY
Status: DISCONTINUED | OUTPATIENT
Start: 2025-05-02 | End: 2025-05-12 | Stop reason: HOSPADM

## 2025-05-02 RX ORDER — INSULIN LISPRO 100 [IU]/ML
2-9 INJECTION, SOLUTION INTRAVENOUS; SUBCUTANEOUS
Status: DISCONTINUED | OUTPATIENT
Start: 2025-05-02 | End: 2025-05-04

## 2025-05-02 RX ORDER — ATORVASTATIN CALCIUM 40 MG/1
80 TABLET, FILM COATED ORAL NIGHTLY
Status: DISCONTINUED | OUTPATIENT
Start: 2025-05-02 | End: 2025-05-12 | Stop reason: HOSPADM

## 2025-05-02 RX ORDER — BUSPIRONE HYDROCHLORIDE 5 MG/1
5 TABLET ORAL 2 TIMES DAILY
COMMUNITY

## 2025-05-02 RX ORDER — LURASIDONE HYDROCHLORIDE 40 MG/1
80 TABLET, FILM COATED ORAL
Status: DISCONTINUED | OUTPATIENT
Start: 2025-05-02 | End: 2025-05-12 | Stop reason: HOSPADM

## 2025-05-02 RX ORDER — PROMETHAZINE HYDROCHLORIDE 25 MG/1
12.5-25 SUPPOSITORY RECTAL EVERY 4 HOURS PRN
Status: DISCONTINUED | OUTPATIENT
Start: 2025-05-02 | End: 2025-05-04

## 2025-05-02 RX ORDER — DEXTROSE MONOHYDRATE 25 G/50ML
25 INJECTION, SOLUTION INTRAVENOUS
Status: DISCONTINUED | OUTPATIENT
Start: 2025-05-02 | End: 2025-05-04

## 2025-05-02 RX ADMIN — LURASIDONE HYDROCHLORIDE 80 MG: 80 TABLET, FILM COATED ORAL at 18:13

## 2025-05-02 RX ADMIN — INSULIN LISPRO 5 UNITS: 100 INJECTION, SOLUTION INTRAVENOUS; SUBCUTANEOUS at 17:49

## 2025-05-02 RX ADMIN — INSULIN GLARGINE-YFGN 50 UNITS: 100 INJECTION, SOLUTION SUBCUTANEOUS at 17:49

## 2025-05-02 RX ADMIN — BUSPIRONE HYDROCHLORIDE 5 MG: 5 TABLET ORAL at 18:13

## 2025-05-02 RX ADMIN — ENOXAPARIN SODIUM 40 MG: 100 INJECTION SUBCUTANEOUS at 17:38

## 2025-05-02 RX ADMIN — SODIUM CHLORIDE: 9 INJECTION, SOLUTION INTRAVENOUS at 17:45

## 2025-05-02 RX ADMIN — INSULIN LISPRO 3 UNITS: 100 INJECTION, SOLUTION INTRAVENOUS; SUBCUTANEOUS at 20:26

## 2025-05-02 RX ADMIN — ATORVASTATIN CALCIUM 80 MG: 80 TABLET, FILM COATED ORAL at 20:25

## 2025-05-02 RX ADMIN — SODIUM CHLORIDE, POTASSIUM CHLORIDE, SODIUM LACTATE AND CALCIUM CHLORIDE 1000 ML: 600; 310; 30; 20 INJECTION, SOLUTION INTRAVENOUS at 13:48

## 2025-05-02 RX ADMIN — DIVALPROEX SODIUM 1500 MG: 500 TABLET, DELAYED RELEASE ORAL at 18:13

## 2025-05-02 ASSESSMENT — COGNITIVE AND FUNCTIONAL STATUS - GENERAL
SUGGESTED CMS G CODE MODIFIER MOBILITY: CK
DRESSING REGULAR LOWER BODY CLOTHING: A LITTLE
WALKING IN HOSPITAL ROOM: A LITTLE
MOVING TO AND FROM BED TO CHAIR: A LITTLE
MOBILITY SCORE: 19
STANDING UP FROM CHAIR USING ARMS: A LITTLE
CLIMB 3 TO 5 STEPS WITH RAILING: A LOT
DAILY ACTIVITIY SCORE: 23
SUGGESTED CMS G CODE MODIFIER DAILY ACTIVITY: CI

## 2025-05-02 ASSESSMENT — ENCOUNTER SYMPTOMS
NERVOUS/ANXIOUS: 1
ROS GI COMMENTS: NO APPETITE
FEVER: 0
COUGH: 0
WEIGHT LOSS: 1
DEPRESSION: 1
SHORTNESS OF BREATH: 0
INSOMNIA: 1
ABDOMINAL PAIN: 1
CHILLS: 0
VOMITING: 1
NAUSEA: 1

## 2025-05-02 ASSESSMENT — LIFESTYLE VARIABLES
AVERAGE NUMBER OF DAYS PER WEEK YOU HAVE A DRINK CONTAINING ALCOHOL: 0
HAVE YOU EVER FELT YOU SHOULD CUT DOWN ON YOUR DRINKING: NO
ALCOHOL_USE: NO
TOTAL SCORE: 0
EVER HAD A DRINK FIRST THING IN THE MORNING TO STEADY YOUR NERVES TO GET RID OF A HANGOVER: NO
HAVE PEOPLE ANNOYED YOU BY CRITICIZING YOUR DRINKING: NO
HOW MANY TIMES IN THE PAST YEAR HAVE YOU HAD 5 OR MORE DRINKS IN A DAY: 0
TOTAL SCORE: 0
ON A TYPICAL DAY WHEN YOU DRINK ALCOHOL HOW MANY DRINKS DO YOU HAVE: 0
EVER FELT BAD OR GUILTY ABOUT YOUR DRINKING: NO
TOTAL SCORE: 0
CONSUMPTION TOTAL: NEGATIVE

## 2025-05-02 ASSESSMENT — SOCIAL DETERMINANTS OF HEALTH (SDOH)
WITHIN THE LAST YEAR, HAVE YOU BEEN KICKED, HIT, SLAPPED, OR OTHERWISE PHYSICALLY HURT BY YOUR PARTNER OR EX-PARTNER?: NO
WITHIN THE LAST YEAR, HAVE YOU BEEN AFRAID OF YOUR PARTNER OR EX-PARTNER?: NO
WITHIN THE PAST 12 MONTHS, YOU WORRIED THAT YOUR FOOD WOULD RUN OUT BEFORE YOU GOT THE MONEY TO BUY MORE: NEVER TRUE
WITHIN THE LAST YEAR, HAVE YOU BEEN HUMILIATED OR EMOTIONALLY ABUSED IN OTHER WAYS BY YOUR PARTNER OR EX-PARTNER?: NO
WITHIN THE LAST YEAR, HAVE TO BEEN RAPED OR FORCED TO HAVE ANY KIND OF SEXUAL ACTIVITY BY YOUR PARTNER OR EX-PARTNER?: NO
IN THE PAST 12 MONTHS, HAS THE ELECTRIC, GAS, OIL, OR WATER COMPANY THREATENED TO SHUT OFF SERVICE IN YOUR HOME?: NO
WITHIN THE PAST 12 MONTHS, THE FOOD YOU BOUGHT JUST DIDN'T LAST AND YOU DIDN'T HAVE MONEY TO GET MORE: NEVER TRUE

## 2025-05-02 ASSESSMENT — PAIN DESCRIPTION - PAIN TYPE: TYPE: ACUTE PAIN

## 2025-05-02 ASSESSMENT — FIBROSIS 4 INDEX
FIB4 SCORE: 0.79
FIB4 SCORE: 0.69

## 2025-05-02 NOTE — ASSESSMENT & PLAN NOTE
I discussed advance care planning with the patient for at least 17 minutes, including diagnosis, prognosis, plan of care, risks and benefits of any therapies that could be offered, as well as alternatives including palliation and hospice, as appropriate.  Wants his mother as medical decision maker in he was deemed incapacitated

## 2025-05-02 NOTE — ED NOTES
PT wheeled to [facility] 58 with a steady gate. C/C is weakness . Pt is in a gown, on the monitor, and call light is within reach. Chart up for ERP.

## 2025-05-02 NOTE — ED PROVIDER NOTES
ER Provider Note    Scribed for Lola Lopez M.d. by Paradise Salazar. 5/2/2025  12:41 PM    Primary Care Provider: ANIKET Ellington    CHIEF COMPLAINT  Chief Complaint   Patient presents with    Headache    Nausea    Fatigue    Loss of Appetite     LIMITATION TO HISTORY   Select: : None    HPI/ROS  OUTSIDE HISTORIAN(S):  None    EXTERNAL RECORDS REVIEWED  Outpatient Notes Patient seen at Endocrinology on 3/17/25 for follow up of type 2 diabetes on insulin, hypothyroidism, hyperlipidemia     Norm Prabhakar is a 44 y.o. male who presents to the ED via EMS for evaluation of a fatigue onset yesterday. Patient reports associated fatigue, headache, loss of appetite and nausea. He reports an episode of emesis yesterday. He states since noon yesterday he has been sleeping. He reports drinking water and Gatorade. Denies dysuria. Denies any specific concerns or pain. Patient received Zofran on route. No alleviating or exacerbating factors noted.     PAST MEDICAL HISTORY  Past Medical History:   Diagnosis Date    Anxiety     BIPOLAR    ASTHMA     Bipolar 1 disorder ([facility])     Depression     Diabetes ([facility])     Type II Diabetes    Fall     passed out 2 wks ago    Glaucoma     Glaucoma 1982    both eyes/ blind on left eye    Hypothyroidism     Indigestion     once in a while    Mental disorder     learning disabilities; speech impairment; developmental delays    Murmur     since birth    Pneumonia     remote    Psychiatric problem 2002    PTSD    S/P thyroidectomy     Seizure ([facility]) 2010    Seizure disorder ([facility])     Unspecified disorder of thyroid      SURGICAL HISTORY  Past Surgical History:   Procedure Laterality Date    EYE SURGERY      OTHER      Hernia Repair when he was 10 yrs old    THYROID LOBECTOMY       FAMILY HISTORY  Family History   Problem Relation Age of Onset    Hypertension Mother     Heart Disease Mother     Lung Disease Mother     Stroke Maternal Grandmother      SOCIAL HISTORY   reports that he quit  "smoking about 5 years ago. His smoking use included cigarettes and cigars. He has never used smokeless tobacco. He reports that he does not currently use alcohol. He reports that he does not currently use drugs after having used the following drugs: Inhaled.    CURRENT MEDICATIONS  Current Discharge Medication List        CONTINUE these medications which have NOT CHANGED    Details   divalproex (DEPAKOTE) 500 MG Tablet Delayed Response Take 500-1,500 mg by mouth 2 times a day. 500 mg in the AM  1500 mg in the PM      insulin aspart (NOVOLOG) 100 UNIT/ML Solution Inject 10-20 Units under the skin 3 times a day before meals. 20 units per meal   10 units per snack      busPIRone (BUSPAR) 5 MG tablet Take 5 mg by mouth 2 times a day.      naltrexone (DEPADE) 50 MG Tab Take 50 mg by mouth every day.      aspirin (ASA) 81 MG Chew Tab chewable tablet Chew 81 mg every day.      metformin (GLUCOPHAGE) 1000 MG tablet Take 1,000 mg by mouth 2 times a day.      insulin glargine (LANTUS) 100 UNIT/ML SC SOLN Inject 50 Units under the skin every evening.      atorvastatin (LIPITOR) 80 MG tablet Take 80 mg by mouth every evening.      Fenofibrate 134 MG Cap Take 134 mg by mouth every day.      levothyroxine (SYNTHROID) 100 MCG Tab Take 100 mcg by mouth every morning on an empty stomach.      lurasidone (LATUDA) 80 MG Tab Take 80 mg by mouth with dinner.      sertraline (ZOLOFT) 100 MG Tab Take 200 mg by mouth every day.      traZODone (DESYREL) 100 MG Tab Take 100 mg by mouth at bedtime as needed for Sleep. Indications: Trouble Sleeping           ALLERGIES  Abilify, Fish, Geodon [ziprasidone hcl], Aripiprazole, Hydroxyzine, and Ziprasidone    PHYSICAL EXAM  /86   Pulse 78   Temp 36.6 °C (97.8 °F) (Temporal)   Resp 18   Ht 1.981 m (6' 6\")   Wt 117 kg (258 lb)   SpO2 94%   BMI 29.81 kg/m²   Constitutional: Alert in no apparent distress.  HENT: No signs of trauma, Bilateral external ears normal, Nose normal. Oral mucosa " slightly dry   Eyes: Pupils are equal and reactive, Conjunctiva normal, Non-icteric.   Neck: No stridor.   Cardiovascular: Regular rate and rhythm, no murmurs.   Thorax & Lungs: Normal breath sounds, No respiratory distress, No wheezing, No chest tenderness.   Abdomen: Bowel sounds normal, Soft, No tenderness, No masses, No peritoneal signs.  Skin: Warm, Dry, No erythema, No rash.   Musculoskeletal:  No major deformities noted.  Neurologic: Alert, moving all extremities without difficulty, no focal deficits. Slightly delayed, seems to be chronic     DIAGNOSTIC STUDIES & PROCEDURES  Labs:   Results for orders placed or performed during the hospital encounter of 05/02/25   POCT glucose device results    Collection Time: 05/02/25 11:45 AM   Result Value Ref Range    POC Glucose, Blood 286 (H) 65 - 99 mg/dL   POC CoV-2, FLU A/B, RSV by PCR    Collection Time: 05/02/25 11:54 AM   Result Value Ref Range    POC Influenza A RNA, PCR Negative Negative    POC Influenza B RNA, PCR Negative Negative    POC RSV, by PCR Negative Negative    POC SARS-CoV-2, PCR NotDetected NotDetected   CBC WITH DIFFERENTIAL    Collection Time: 05/02/25  1:46 PM   Result Value Ref Range    WBC 8.7 4.8 - 10.8 K/uL    RBC 4.36 (L) 4.70 - 6.10 M/uL    Hemoglobin 13.4 (L) 14.0 - 18.0 g/dL    Hematocrit 37.9 (L) 42.0 - 52.0 %    MCV 86.9 81.4 - 97.8 fL    MCH 30.7 27.0 - 33.0 pg    MCHC 35.4 32.3 - 36.5 g/dL    RDW 44.9 35.9 - 50.0 fL    Platelet Count 310 164 - 446 K/uL    MPV 10.7 9.0 - 12.9 fL    Neutrophils-Polys 60.00 44.00 - 72.00 %    Lymphocytes 30.40 22.00 - 41.00 %    Monocytes 6.70 0.00 - 13.40 %    Eosinophils 0.80 0.00 - 6.90 %    Basophils 1.40 0.00 - 1.80 %    Immature Granulocytes 0.70 0.00 - 0.90 %    Nucleated RBC 0.00 0.00 - 0.20 /100 WBC    Neutrophils (Absolute) 5.23 1.82 - 7.42 K/uL    Lymphs (Absolute) 2.65 1.00 - 4.80 K/uL    Monos (Absolute) 0.58 0.00 - 0.85 K/uL    Eos (Absolute) 0.07 0.00 - 0.51 K/uL    Baso (Absolute) 0.12  0.00 - 0.12 K/uL    Immature Granulocytes (abs) 0.06 0.00 - 0.11 K/uL    NRBC (Absolute) 0.00 K/uL   COMP METABOLIC PANEL    Collection Time: 25  1:46 PM   Result Value Ref Range    Sodium 127 (L) 135 - 145 mmol/L    Potassium 3.7 3.6 - 5.5 mmol/L    Chloride 94 (L) 96 - 112 mmol/L    Co2 20 20 - 33 mmol/L    Anion Gap 13.0 7.0 - 16.0    Glucose 293 (H) 65 - 99 mg/dL    Bun 16 8 - 22 mg/dL    Creatinine 1.03 0.50 - 1.40 mg/dL    Calcium 8.9 8.5 - 10.5 mg/dL    Correct Calcium 8.7 8.5 - 10.5 mg/dL    AST(SGOT) 24 12 - 45 U/L    ALT(SGPT) 17 2 - 50 U/L    Alkaline Phosphatase 100 (H) 30 - 99 U/L    Total Bilirubin 0.8 0.1 - 1.5 mg/dL    Albumin 4.2 3.2 - 4.9 g/dL    Total Protein 6.8 6.0 - 8.2 g/dL    Globulin 2.6 1.9 - 3.5 g/dL    A-G Ratio 1.6 g/dL   LIPASE    Collection Time: 25  1:46 PM   Result Value Ref Range    Lipase 13 11 - 82 U/L   ESTIMATED GFR    Collection Time: 25  1:46 PM   Result Value Ref Range    GFR (CKD-EPI) 92 >60 mL/min/1.73 m 2   TSH WITH REFLEX TO FT4    Collection Time: 25  1:46 PM   Result Value Ref Range    TSH 60.000 (H) 0.380 - 5.330 uIU/mL   HEMOGLOBIN A1C    Collection Time: 25  1:46 PM   Result Value Ref Range    Glycohemoglobin 18.9 (H) 4.0 - 5.6 %    Est Avg Glucose 496 mg/dL   FREE THYROXINE    Collection Time: 25  1:46 PM   Result Value Ref Range    Free T-4 <0.11 (L) 0.93 - 1.70 ng/dL   POCT glucose device results    Collection Time: 25  5:37 PM   Result Value Ref Range    POC Glucose, Blood 268 (H) 65 - 99 mg/dL   EKG    Collection Time: 25  7:30 PM   Result Value Ref Range    Report       [facility] Emergency Dept.    Test Date:  2025  Pt Name:    HOLLY KIM                 Department: ER  MRN:        [id number]                      Room:       T217  Gender:     Male                         Technician: [id number]  :        1980                   Requested By:ER TRIAGE PROTOCOL  Order #:    [id number]                     Reading MD: JAMES SILVER    Measurements  Intervals                                Axis  Rate:       60                           P:          61  MT:         181                          QRS:        -57  QRSD:       120                          T:          67  QT:         447  QTc:        447    Interpretive Statements  Sinus rhythm  Nonspecific IVCD with LAD  Baseline wander in lead(s) V2  Compared to ECG 08/12/2024 11:10:17  No significant changes  Impression sinus rhythm no evidence of ischemia.  Electronically Signed On 05- 19:30:16 PDT by JAMES SILVER       *Note: Due to a large number of results and/or encounters for the requested time period, some results have not been displayed. A complete set of results can be found in Results Review.     All labs reviewed by me.    EKG:   I have independently interpreted this EKG as seen above.    COURSE & MEDICAL DECISION MAKING    ED Observation Status? No; Patient does not meet criteria for ED Observation.     12:41 PM - Patient seen and evaluated at bedside. Patient will be treated with LR bolus for his symptoms. Ordered CBC w diff, CMP, lipase, CoV-2 Flu A/B and RSV PCR to evaluate. He understands and agrees to the plan of care. Differential diagnoses include but are not limited to: dehydration, electrolyte abnormality     2:47 PM - Patient was reevaluated at bedside. Discussed with patient plan for hospitalization. Patient verbalizes understanding and agreement to this plan of care.     2:56 PM - I discussed the patient's case and the above findings with Dr. Kiser (Hospitalist) who agreed to hospitalize the patient. Patient's care was transferred at this time.    INITIAL ASSESSMENT AND PLAN  Care Narrative: This is a 44-year-old gentleman that presents with generalized weakness.  Patient does have a history of type 2 diabetes he has been dehydrated in the past and was concerned for this again along with electrolyte abnormality.  Labs are  obtained and he has slightly hyponatremic he was a month ago.  This is not typical for him despite his diabetes.  His corrected sodium is 130.  However normally he has a normal sodium.  It is possible that this slight hyponatremia is causing him some vague weakness like symptoms.  I do think it is reasonable to hospitalize him for hydration and correction of this I do think CDU is reasonable.  I spoke with Dr. Kiser who is agreeable to consult for hospitalization.  Patient will be hospitalized in guarded condition.      Hydration: Based on the patient's presentation of Dehydration the patient was given IV fluids. IV Hydration was used because oral hydration was not adequate alone. Upon recheck following hydration, the patient was improved.                 DISPOSITION AND DISCUSSIONS  I have discussed management of the patient with the following physicians and MANNY's: Dr. Kiser (Hospitalist)    Discussion of management with other [facility] or appropriate source(s): None       DISPOSITION:  Patient will be hospitalized by Dr. Kiser in guarded condition.    FINAL IMPRESSION   1. Dehydration    2. Hyponatremia      Paradise ROB (Robertibe), am scribing for, and in the presence of, Lola Lopez M.D..    Electronically signed by: Paradise Salazar (Robertibe), 5/2/2025    ILola M.D. personally performed the services described in this documentation, as scribed by Paradise Salazar in my presence, and it is both accurate and complete.    The note accurately reflects work and decisions made by me.  Lola Lopez M.D.  5/2/2025  7:31 PM

## 2025-05-02 NOTE — ASSESSMENT & PLAN NOTE
Poor control, BSs are somewhat better today 120s-150's  A1c is 19, education given  Hold outpatient scheduled lispro  Continue lantus 55 units QHS, ISS, hypoglycemia protocol   Advised to follow-up with outpatient endocrinology

## 2025-05-02 NOTE — ED NOTES
Break RN: Pt resting in room. Vs stable. No acute distress noted. Call light in place. Will continue to monitor.

## 2025-05-02 NOTE — ED NOTES
Med Rec completed per patient's home pharmacy ([facility])   Allergies reviewed  No ORAL antibiotics in last 30 days  Dispense history available in EPIC? No    Patient is not taking anticoagulants

## 2025-05-02 NOTE — ED NOTES
Chief Complaint   Patient presents with    Headache    Nausea    Fatigue    Loss of Appetite     Pt bib ems from home with above complaints x4days, pt said that he usually get headaches and take otc medicines.   Viral swab collected  Fsbs 286  Pt was given zofran odt pta

## 2025-05-02 NOTE — ASSESSMENT & PLAN NOTE
Likely  secondary to severe dehydration and severe hypothyroidism, sodium improving at 132, monitor

## 2025-05-02 NOTE — ASSESSMENT & PLAN NOTE
No current medications  Remains on the low side, monitor closely  BP soft, having some orthostatic symptoms   Encourage hydration and mobility   Compression stockings ordered

## 2025-05-02 NOTE — ASSESSMENT & PLAN NOTE
TSH elevated at 60, T4 is undetectable  Patient is adamant that he takes it daily and on ES  Increase synthroid to 200 mcg daily  Recheck TFT's in 4- 6 wks    Symptomatic hypothyroidism  Currently no concern for myxedema  Continue complain of worsening generalized weakness  I have started patient on 1 dose of 200 mcg IV thyroxine for ongoing symptomatic hypothyroidism.  I advised him to follow-up with endocrinology as outpatient for better management of hypothyroidism and diabetes.  Patient verbalized understanding.  PT/OT  Continue levothyroxine 200 mcg daily

## 2025-05-02 NOTE — H&P
Hospital Medicine History & Physical Note    Date of Service  5/2/2025    Primary Care Physician  ANIKET Ellington    Consultants  none    Specialist Names: none    Code Status  Full Code    Chief Complaint  Chief Complaint   Patient presents with    Headache    Nausea    Fatigue    Loss of Appetite       History of Presenting Illness  Norm Prabhakar is a 44 y.o. male who presented 5/2/2025 with PTSD, type 2 diabetes mellitus not controlled, major depressive disorder, insomnia who comes into the hospital with generalized weakness.  Patient states that he has not been feeling well for the last 4 days with a constellation of symptoms including nausea, vomiting, fatigue and decreased appetite.  He has never felt this poorly before.  He denies any sick contacts.  Denies any change in urination or diarrhea.  Every time he drives a semithrows it up.  Denies any fevers.  Only medication changes that his BuSpar and trazodone were increased for anxiety and insomnia.  He has been undergoing a lot more stress recently with his dad and grandma dying recently.  His blood sugars remained 200-300 at home.    In the emergency room he was found to have a hyponatremia of 130 corrected.  He was given an LR bolus of 1 L.  I personally spoke with Dr. Marks of the ER physician group in detail at the patient's case    I discussed the plan of care with patient.    Review of Systems  Review of Systems   Constitutional:  Positive for malaise/fatigue and weight loss. Negative for chills and fever.   Respiratory:  Negative for cough and shortness of breath.    Gastrointestinal:  Positive for abdominal pain, nausea and vomiting.        No appetite   Psychiatric/Behavioral:  Positive for depression. The patient is nervous/anxious and has insomnia.        Past Medical History   has a past medical history of Anxiety, ASTHMA, Bipolar 1 disorder (HCC), Depression, Diabetes (HCC), Fall, Glaucoma, Glaucoma (1982), Hypothyroidism,  "Indigestion, Mental disorder, Murmur, Pneumonia, Psychiatric problem (2002), S/P thyroidectomy, Seizure (HCC) (2010), Seizure disorder (HCC), and Unspecified disorder of thyroid.    Surgical History   has a past surgical history that includes eye surgery; thyroid lobectomy; and other.     Family History  family history includes Heart Disease in his mother; Hypertension in his mother; Lung Disease in his mother; Stroke in his maternal grandmother.   Family history reviewed with patient. There is no family history that is pertinent to the chief complaint.     Social History   reports that he quit smoking about 5 years ago. His smoking use included cigarettes and cigars. He has never used smokeless tobacco. He reports that he does not currently use alcohol. He reports that he does not currently use drugs after having used the following drugs: Inhaled.    Allergies  Allergies   Allergen Reactions    Geodon [Ziprasidone Hcl] Anaphylaxis     Anaphylaxis per patient    Aripiprazole      \"I became lethargic.\"    Abilify      \"Feeling tired, like I don't even know whats going on around me\"    Fish      Pt reports salmon causes him to be sick to his stomach  Not listed on MAR noted 2/3/2021      Hydroxyzine Itching     Pt will only state \"I feel itchy when I take it       Medications  Prior to Admission Medications   Prescriptions Last Dose Informant Patient Reported? Taking?   Fenofibrate 134 MG Cap 5/2/2025 Morning Patient's Home Pharmacy Yes Yes   Sig: Take 134 mg by mouth every day.   aspirin (ASA) 81 MG Chew Tab chewable tablet 5/2/2025 Morning Patient's Home Pharmacy Yes Yes   Sig: Chew 81 mg every day.   atorvastatin (LIPITOR) 80 MG tablet 5/1/2025 Evening Patient's Home Pharmacy Yes Yes   Sig: Take 80 mg by mouth every evening.   busPIRone (BUSPAR) 5 MG tablet 5/2/2025 Morning Patient's Home Pharmacy Yes Yes   Sig: Take 5 mg by mouth 2 times a day.   divalproex (DEPAKOTE) 500 MG Tablet Delayed Response 5/2/2025 " Morning Patient's Home Pharmacy Yes Yes   Sig: Take 500-1,500 mg by mouth 2 times a day. 500 mg in the AM  1500 mg in the PM   insulin aspart (NOVOLOG) 100 UNIT/ML Solution 5/2/2025 Morning Patient's Home Pharmacy Yes Yes   Sig: Inject 10-20 Units under the skin 3 times a day before meals. 20 units per meal   10 units per snack   insulin glargine (LANTUS) 100 UNIT/ML SC SOLN 5/1/2025 Evening Patient's Home Pharmacy Yes Yes   Sig: Inject 50 Units under the skin every evening.   levothyroxine (SYNTHROID) 100 MCG Tab 5/2/2025 Morning Patient's Home Pharmacy Yes Yes   Sig: Take 100 mcg by mouth every morning on an empty stomach.   lurasidone (LATUDA) 80 MG Tab 5/1/2025 Evening Patient's Home Pharmacy Yes Yes   Sig: Take 80 mg by mouth with dinner.   metformin (GLUCOPHAGE) 1000 MG tablet 5/2/2025 Morning Patient's Home Pharmacy Yes Yes   Sig: Take 1,000 mg by mouth 2 times a day.   naltrexone (DEPADE) 50 MG Tab 5/2/2025 Morning Patient's Home Pharmacy Yes Yes   Sig: Take 50 mg by mouth every day.   sertraline (ZOLOFT) 100 MG Tab 5/2/2025 Morning Patient's Home Pharmacy Yes Yes   Sig: Take 200 mg by mouth every day.   traZODone (DESYREL) 100 MG Tab Unknown Patient's Home Pharmacy Yes No   Sig: Take 100 mg by mouth at bedtime as needed for Sleep. Indications: Trouble Sleeping      Facility-Administered Medications: None       Physical Exam  Temp:  [36.6 °C (97.8 °F)] 36.6 °C (97.8 °F)  Pulse:  [47-78] 47  Resp:  [10-18] 10  BP: (114-130)/(75-86) 120/79  SpO2:  [91 %-96 %] 96 %  Blood Pressure: 120/79   Temperature: 36.6 °C (97.8 °F)   Pulse: (!) 47   Respiration: (!) 10   Pulse Oximetry: 96 %       Physical Exam  Vitals and nursing note reviewed.   Constitutional:       General: He is not in acute distress.     Appearance: He is well-developed. He is ill-appearing.      Comments: Appears older than stated age  Flat affect, ill appearing, soft spoken   HENT:      Head: Normocephalic and atraumatic.      Mouth/Throat:       "Pharynx: No oropharyngeal exudate.   Eyes:      General: No scleral icterus.     Pupils: Pupils are equal, round, and reactive to light.   Neck:      Thyroid: No thyromegaly.   Cardiovascular:      Rate and Rhythm: Normal rate and regular rhythm.      Heart sounds: Normal heart sounds. No murmur heard.  Pulmonary:      Effort: Pulmonary effort is normal. No respiratory distress.      Breath sounds: Normal breath sounds. No wheezing or rales.   Abdominal:      General: Bowel sounds are normal. There is no distension.      Palpations: Abdomen is soft.      Tenderness: There is abdominal tenderness. There is no guarding or rebound.   Musculoskeletal:         General: No tenderness. Normal range of motion.      Cervical back: Normal range of motion and neck supple.      Right lower leg: No edema.      Left lower leg: No edema.   Skin:     General: Skin is warm and dry.      Findings: No rash.      Comments: Severe xerosis of the B/L LE's   No open skin lesions appreciated   Neurological:      Mental Status: He is alert and oriented to person, place, and time.      Cranial Nerves: No cranial nerve deficit.         Laboratory:  Recent Labs     05/02/25  1346   WBC 8.7   RBC 4.36*   HEMOGLOBIN 13.4*   HEMATOCRIT 37.9*   MCV 86.9   MCH 30.7   MCHC 35.4   RDW 44.9   PLATELETCT 310   MPV 10.7     Recent Labs     05/02/25  1346   SODIUM 127*   POTASSIUM 3.7   CHLORIDE 94*   CO2 20   GLUCOSE 293*   BUN 16   CREATININE 1.03   CALCIUM 8.9     Recent Labs     05/02/25  1346   ALTSGPT 17   ASTSGOT 24   ALKPHOSPHAT 100*   TBILIRUBIN 0.8   LIPASE 13   GLUCOSE 293*         No results for input(s): \"NTPROBNP\" in the last 72 hours.      No results for input(s): \"TROPONINT\" in the last 72 hours.    Imaging:  No orders to display       I personally reviewed the cbc, viral PCR, liapse and cmp    Assessment/Plan:  Justification for Admission Status  I anticipate this patient is appropriate for observation status at this time because " symptomatic hyponatremia    Patient will need a Med/Surg bed on MEDICAL service .  The need is secondary to above.    * Hyponatremia- (present on admission)  Assessment & Plan  Corrected for mild hyperglycemia is 130  Check UA, check TSH  Appears quite symptomatic with generalized fatigue, malaise and poor appetite  Trend  IVF's since quite hypovolemic on exam  If does not have further improvement, will do additional studies    ACP (advance care planning)- (present on admission)  Assessment & Plan  I discussed advance care planning with the patient for at least 17 minutes, including diagnosis, prognosis, plan of care, risks and benefits of any therapies that could be offered, as well as alternatives including palliation and hospice, as appropriate.  Wants his mother as medical decision maker in he was deemed incapacitated      Hypothyroidism- (present on admission)  Assessment & Plan  Continue outpatient synthroid, check tsh    Primary hypertension- (present on admission)  Assessment & Plan  Continue outaptient medications, decent control here    Class 1 obesity due to excess calories with serious comorbidity and body mass index (BMI) of 32.0 to 32.9 in adult- (present on admission)  Assessment & Plan  Outpatient nutritional counseling    Type 2 diabetes mellitus without complication, without long-term current use of insulin (HCC)- (present on admission)  Assessment & Plan  Poor control  Check A1c   Hold outpatient scheduled lispro  Continue lantus 50 units QHS, may need to be reduced depending on roal intake  ISS    PTSD (post-traumatic stress disorder)- (present on admission)  Assessment & Plan  Psych meds as noted above    Anemia- (present on admission)  Assessment & Plan  Near his baseline, follow    Anxiety and depression- (present on admission)  Assessment & Plan  Not well controlled, but no SI nor HI  Continue buspar, zoloft, trazodone, and lurasidone        VTE prophylaxis: SCDs/TEDs and enoxaparin ppx

## 2025-05-03 LAB
ANION GAP SERPL CALC-SCNC: 9 MMOL/L (ref 7–16)
BASOPHILS # BLD AUTO: 0.7 % (ref 0–1.8)
BASOPHILS # BLD: 0.09 K/UL (ref 0–0.12)
BUN SERPL-MCNC: 14 MG/DL (ref 8–22)
CALCIUM SERPL-MCNC: 8.4 MG/DL (ref 8.5–10.5)
CHLORIDE SERPL-SCNC: 98 MMOL/L (ref 96–112)
CO2 SERPL-SCNC: 24 MMOL/L (ref 20–33)
CREAT SERPL-MCNC: 0.95 MG/DL (ref 0.5–1.4)
EOSINOPHIL # BLD AUTO: 0.15 K/UL (ref 0–0.51)
EOSINOPHIL NFR BLD: 1.2 % (ref 0–6.9)
ERYTHROCYTE [DISTWIDTH] IN BLOOD BY AUTOMATED COUNT: 46.5 FL (ref 35.9–50)
GFR SERPLBLD CREATININE-BSD FMLA CKD-EPI: 102 ML/MIN/1.73 M 2
GLUCOSE BLD STRIP.AUTO-MCNC: 148 MG/DL (ref 65–99)
GLUCOSE BLD STRIP.AUTO-MCNC: 211 MG/DL (ref 65–99)
GLUCOSE BLD STRIP.AUTO-MCNC: 230 MG/DL (ref 65–99)
GLUCOSE BLD STRIP.AUTO-MCNC: 248 MG/DL (ref 65–99)
GLUCOSE SERPL-MCNC: 187 MG/DL (ref 65–99)
HCT VFR BLD AUTO: 36.9 % (ref 42–52)
HGB BLD-MCNC: 12.4 G/DL (ref 14–18)
IMM GRANULOCYTES # BLD AUTO: 0.08 K/UL (ref 0–0.11)
IMM GRANULOCYTES NFR BLD AUTO: 0.7 % (ref 0–0.9)
LYMPHOCYTES # BLD AUTO: 4.5 K/UL (ref 1–4.8)
LYMPHOCYTES NFR BLD: 37.2 % (ref 22–41)
MCH RBC QN AUTO: 30.1 PG (ref 27–33)
MCHC RBC AUTO-ENTMCNC: 33.6 G/DL (ref 32.3–36.5)
MCV RBC AUTO: 89.6 FL (ref 81.4–97.8)
MONOCYTES # BLD AUTO: 0.87 K/UL (ref 0–0.85)
MONOCYTES NFR BLD AUTO: 7.2 % (ref 0–13.4)
NEUTROPHILS # BLD AUTO: 6.41 K/UL (ref 1.82–7.42)
NEUTROPHILS NFR BLD: 53 % (ref 44–72)
NRBC # BLD AUTO: 0 K/UL
NRBC BLD-RTO: 0 /100 WBC (ref 0–0.2)
PLATELET # BLD AUTO: 287 K/UL (ref 164–446)
PMV BLD AUTO: 10.6 FL (ref 9–12.9)
POTASSIUM SERPL-SCNC: 3 MMOL/L (ref 3.6–5.5)
RBC # BLD AUTO: 4.12 M/UL (ref 4.7–6.1)
SODIUM SERPL-SCNC: 131 MMOL/L (ref 135–145)
WBC # BLD AUTO: 12.1 K/UL (ref 4.8–10.8)

## 2025-05-03 PROCEDURE — 770006 HCHG ROOM/CARE - MED/SURG/GYN SEMI*

## 2025-05-03 PROCEDURE — 96372 THER/PROPH/DIAG INJ SC/IM: CPT

## 2025-05-03 PROCEDURE — 36415 COLL VENOUS BLD VENIPUNCTURE: CPT

## 2025-05-03 PROCEDURE — 80048 BASIC METABOLIC PNL TOTAL CA: CPT

## 2025-05-03 PROCEDURE — A9270 NON-COVERED ITEM OR SERVICE: HCPCS | Performed by: INTERNAL MEDICINE

## 2025-05-03 PROCEDURE — 85025 COMPLETE CBC W/AUTO DIFF WBC: CPT

## 2025-05-03 PROCEDURE — 700102 HCHG RX REV CODE 250 W/ 637 OVERRIDE(OP): Performed by: INTERNAL MEDICINE

## 2025-05-03 PROCEDURE — 99232 SBSQ HOSP IP/OBS MODERATE 35: CPT | Performed by: INTERNAL MEDICINE

## 2025-05-03 PROCEDURE — 700111 HCHG RX REV CODE 636 W/ 250 OVERRIDE (IP): Mod: JZ | Performed by: INTERNAL MEDICINE

## 2025-05-03 PROCEDURE — 82962 GLUCOSE BLOOD TEST: CPT | Mod: 91

## 2025-05-03 RX ORDER — LEVOTHYROXINE SODIUM 200 UG/1
200 TABLET ORAL
Status: DISCONTINUED | OUTPATIENT
Start: 2025-05-04 | End: 2025-05-12 | Stop reason: HOSPADM

## 2025-05-03 RX ORDER — POTASSIUM CHLORIDE 1500 MG/1
40 TABLET, EXTENDED RELEASE ORAL 2 TIMES DAILY
Status: DISCONTINUED | OUTPATIENT
Start: 2025-05-03 | End: 2025-05-04

## 2025-05-03 RX ADMIN — POTASSIUM CHLORIDE 40 MEQ: 1500 TABLET, EXTENDED RELEASE ORAL at 18:39

## 2025-05-03 RX ADMIN — LURASIDONE HYDROCHLORIDE 80 MG: 80 TABLET, FILM COATED ORAL at 18:38

## 2025-05-03 RX ADMIN — INSULIN LISPRO 3 UNITS: 100 INJECTION, SOLUTION INTRAVENOUS; SUBCUTANEOUS at 18:30

## 2025-05-03 RX ADMIN — ASPIRIN 81 MG: 81 TABLET, CHEWABLE ORAL at 05:21

## 2025-05-03 RX ADMIN — INSULIN GLARGINE-YFGN 50 UNITS: 100 INJECTION, SOLUTION SUBCUTANEOUS at 18:32

## 2025-05-03 RX ADMIN — BUSPIRONE HYDROCHLORIDE 5 MG: 5 TABLET ORAL at 18:30

## 2025-05-03 RX ADMIN — POTASSIUM CHLORIDE 40 MEQ: 1500 TABLET, EXTENDED RELEASE ORAL at 09:48

## 2025-05-03 RX ADMIN — FENOFIBRATE 134 MG: 134 CAPSULE ORAL at 05:23

## 2025-05-03 RX ADMIN — ATORVASTATIN CALCIUM 80 MG: 80 TABLET, FILM COATED ORAL at 22:16

## 2025-05-03 RX ADMIN — SERTRALINE 200 MG: 100 TABLET, FILM COATED ORAL at 05:24

## 2025-05-03 RX ADMIN — INSULIN LISPRO 3 UNITS: 100 INJECTION, SOLUTION INTRAVENOUS; SUBCUTANEOUS at 11:51

## 2025-05-03 RX ADMIN — DIVALPROEX SODIUM 500 MG: 500 TABLET, DELAYED RELEASE ORAL at 05:22

## 2025-05-03 RX ADMIN — NALTREXONE HYDROCHLORIDE 50 MG: 50 TABLET, FILM COATED ORAL at 05:23

## 2025-05-03 RX ADMIN — ENOXAPARIN SODIUM 40 MG: 100 INJECTION SUBCUTANEOUS at 18:29

## 2025-05-03 RX ADMIN — DIVALPROEX SODIUM 1500 MG: 500 TABLET, DELAYED RELEASE ORAL at 18:29

## 2025-05-03 RX ADMIN — LEVOTHYROXINE SODIUM 100 MCG: 0.05 TABLET ORAL at 05:23

## 2025-05-03 RX ADMIN — BUSPIRONE HYDROCHLORIDE 5 MG: 5 TABLET ORAL at 05:22

## 2025-05-03 RX ADMIN — INSULIN LISPRO 3 UNITS: 100 INJECTION, SOLUTION INTRAVENOUS; SUBCUTANEOUS at 22:53

## 2025-05-03 ASSESSMENT — ENCOUNTER SYMPTOMS
VOMITING: 0
FEVER: 0
SHORTNESS OF BREATH: 0
WEAKNESS: 1
CHILLS: 0
ABDOMINAL PAIN: 0
PALPITATIONS: 0
NAUSEA: 1

## 2025-05-03 ASSESSMENT — PAIN DESCRIPTION - PAIN TYPE
TYPE: ACUTE PAIN

## 2025-05-03 NOTE — PROGRESS NOTES
4 Eyes Skin Assessment Completed by OMID Downing and OMID Rowe.    Head WDL  Ears WDL  Nose WDL  Mouth WDL  Neck WDL  Breast/Chest WDL  Shoulder Blades WDL  Spine WDL  (R) Arm/Elbow/Hand WDL  (L) Arm/Elbow/Hand WDL  Abdomen WDL  Groin WDL  Scrotum/Coccyx/Buttocks WDL  (R) Leg WDL severe dryness, flaky skin  (L) Leg WDL severe dryness, flaky skin  (R) Heel/Foot/Toe Redness and Blanching severe dryness, flaky skin  (L) Heel/Foot/Toe Redness and Blanching severe dryness, flaky skin          Devices In Places Tele Box      Interventions In Place Pillows    Possible Skin Injury No    Pictures Uploaded Into Epic N/A  Wound Consult Placed N/A  RN Wound Prevention Protocol Ordered No

## 2025-05-03 NOTE — PROGRESS NOTES
Pt arrived via gurney to T217, no complaints of pain. Pt able to ambulate small distances, pt reports arm and legs have decreased tone and are fatigued. Updated on plan of care, VSS, bed in low/locked position

## 2025-05-03 NOTE — PROGRESS NOTES
Bedside report received from Salina ANNE at this time. Patient A+Ox4, satting 95% on 1L 02, continue to wean. Denies pain, no distress, VSS, patient is medical. Using call bell approp, bed locked

## 2025-05-03 NOTE — CARE PLAN
The patient is Stable - Low risk of patient condition declining or worsening    Shift Goals  Clinical Goals: IVF, recheck labs  Patient Goals: Sleep, feel better  Family Goals: JUANCARLOS    Progress made toward(s) clinical / shift goals:    Problem: Pain - Standard  Goal: Alleviation of pain or a reduction in pain to the patient’s comfort goal  Description: Target End Date:  Prior to discharge or change in level of careDocument on Vitals flowsheet1.  Document pain using the appropriate pain scale per order or unit policy2.  Educate and implement non-pharmacologic comfort measures (i.e. relaxation, distraction, massage, cold/heat therapy, etc.)3.  Pain management medications as ordered4.  Reassess pain after pain med administration per policy5.  If opiods administered assess patient's response to pain medication is appropriate per POSS sedation scale6.  Follow pain management plan developed in collaboration with patient and interdisciplinary team (including palliative care or pain specialists if applicable)  Outcome: Progressing     Problem: Knowledge Deficit - Standard  Goal: Patient and family/care givers will demonstrate understanding of plan of care, disease process/condition, diagnostic tests and medications  Description: Target End Date:  1-3 days or as soon as patient condition allowsDocument in Patient Education1.  Patient and family/caregiver oriented to unit, equipment, visitation policy and means for communicating concern2.  Complete/review Learning Assessment3.  Assess knowledge level of disease process/condition, treatment plan, diagnostic tests and medications4.  Explain disease process/condition, treatment plan, diagnostic tests and medications  Outcome: Progressing     Problem: Fall Risk  Goal: Patient will remain free from falls  Description: Target End Date:  Prior to discharge or change in level of careDocument interventions on the Mariluz Ball Fall Risk Assessment1.  Assess for fall risk factors2.   Implement fall precautions  Outcome: Progressing       Patient is not progressing towards the following goals:

## 2025-05-03 NOTE — PROGRESS NOTES
Hospital Medicine Daily Progress Note    Date of Service  5/3/2025    Chief Complaint  Norm Prabhakar is a 44 y.o. male admitted 5/2/2025 with PTSD, type 2 diabetes mellitus not controlled, major depressive disorder, insomnia who comes into the hospital with generalized weakness.  Patient was admitted to the CDU.  His hyponatremia slightly improved IV fluids but remains low.  He was also found to have an A1c of 19 and undetectable T4 level.  He is adamant that he takes his thyroid medication appropriately.  He will be transferred to the general medical floor for ongoing management as he remains quite weak and nauseated with poor oral intake.    Hospital Course  See above    Interval Problem Update  5/3  Patient is still quite weak. MInimal appetite, feels quite weak. Discussed lab results with him. Patient states he likes his Cherry Coke a lot. BP is on the lower side this AM. WBC mario, but remains afebrile. Remains at his baseline of chronic respiratory failure with hypoxia.     I have discussed this patient's plan of care and discharge plan at IDT rounds today with Case Management, Nursing, Nursing leadership, and other members of the IDT team.    Consultants/Specialty  none    Code Status  Full Code    Disposition  The patient is not medically cleared for discharge to home or a post-acute facility.  Anticipate discharge to: home with close outpatient follow-up    I have placed the appropriate orders for post-discharge needs.    Review of Systems  Review of Systems   Constitutional:  Negative for chills and fever.   Respiratory:  Negative for shortness of breath.    Cardiovascular:  Negative for chest pain and palpitations.   Gastrointestinal:  Positive for nausea. Negative for abdominal pain and vomiting.        Weak, poor appetite   Neurological:  Positive for weakness (generalized).        Physical Exam  Temp:  [36.3 °C (97.4 °F)-36.6 °C (97.9 °F)] 36.3 °C (97.4 °F)  Pulse:  [47-78] 49  Resp:  [10-18] 16  BP:  ()/(55-86) 91/59  SpO2:  [91 %-97 %] 95 %    Physical Exam  Vitals and nursing note reviewed.   Constitutional:       General: He is not in acute distress.     Appearance: He is well-developed.      Comments: Very weak appearing  Flat affect  Low speech tone   HENT:      Head: Normocephalic and atraumatic.      Mouth/Throat:      Pharynx: No oropharyngeal exudate.   Eyes:      General: No scleral icterus.     Pupils: Pupils are equal, round, and reactive to light.   Neck:      Thyroid: No thyromegaly.   Cardiovascular:      Rate and Rhythm: Normal rate and regular rhythm.      Heart sounds: Normal heart sounds. No murmur heard.  Pulmonary:      Effort: Pulmonary effort is normal. No respiratory distress.      Breath sounds: Normal breath sounds. No wheezing.   Abdominal:      General: Bowel sounds are normal. There is no distension.      Palpations: Abdomen is soft.      Tenderness: There is no abdominal tenderness.   Musculoskeletal:         General: No tenderness. Normal range of motion.      Cervical back: Normal range of motion and neck supple.      Right lower leg: No edema.      Left lower leg: No edema.   Skin:     General: Skin is warm and dry.      Findings: No rash.      Comments: Xerosis   Neurological:      Mental Status: He is alert and oriented to person, place, and time.      Cranial Nerves: No cranial nerve deficit.         Fluids    Intake/Output Summary (Last 24 hours) at 5/3/2025 0949  Last data filed at 5/3/2025 0602  Gross per 24 hour   Intake 1000 ml   Output 850 ml   Net 150 ml        Laboratory  Recent Labs     05/02/25  1346 05/03/25  0036   WBC 8.7 12.1*   RBC 4.36* 4.12*   HEMOGLOBIN 13.4* 12.4*   HEMATOCRIT 37.9* 36.9*   MCV 86.9 89.6   MCH 30.7 30.1   MCHC 35.4 33.6   RDW 44.9 46.5   PLATELETCT 310 287   MPV 10.7 10.6     Recent Labs     05/02/25  1346 05/03/25  0036   SODIUM 127* 131*   POTASSIUM 3.7 3.0*   CHLORIDE 94* 98   CO2 20 24   GLUCOSE 293* 187*   BUN 16 14   CREATININE  1.03 0.95   CALCIUM 8.9 8.4*                   Imaging  No orders to display        Assessment/Plan  * Hyponatremia- (present on admission)  Assessment & Plan  Improved to 131, but remains very weak, needs ongoing hospital management  Likely dehydration and severe hypothyroidism  Continue IVF's  Daily BMP  Monitor    ACP (advance care planning)- (present on admission)  Assessment & Plan  I discussed advance care planning with the patient for at least 17 minutes, including diagnosis, prognosis, plan of care, risks and benefits of any therapies that could be offered, as well as alternatives including palliation and hospice, as appropriate.  Wants his mother as medical decision maker in he was deemed incapacitated      Hypothyroidism- (present on admission)  Assessment & Plan  TSH elevated at 60, T4 is undetectable  Patient is adamant that he takes it daily and on ES  Unclear if absorption issues  Increase synthroid to 200 mcg daily  Recheck TFT's in 4 months  I personally reviewed the above labs and CBC on 5/3    Primary hypertension- (present on admission)  Assessment & Plan  No current medications  Remains on the low side, monitor closely    Class 1 obesity due to excess calories with serious comorbidity and body mass index (BMI) of 32.0 to 32.9 in adult- (present on admission)  Assessment & Plan  Outpatient nutritional counseling    Type 2 diabetes mellitus without complication, without long-term current use of insulin (HCC)- (present on admission)  Assessment & Plan  Poor control, BSs are somewhat better today, I personally reviewed these labs on 5/3  A1c is 19, education given  Hold outpatient scheduled lispro  Continue lantus 50 units QHS, may need to be reduced depending on roal intake  ISS    PTSD (post-traumatic stress disorder)- (present on admission)  Assessment & Plan  Psych meds as noted above    Anemia- (present on admission)  Assessment & Plan  Near his baseline, follow    Anxiety and depression-  (present on admission)  Assessment & Plan  Not well controlled, but no SI nor HI  Continue buspar, zoloft, trazodone, and lurasidone         VTE prophylaxis:   SCDs/TEDs   enoxaparin ppx      I have performed a physical exam and reviewed and updated ROS and Plan today (5/3/2025). In review of yesterday's note (5/2/2025), there are no changes except as documented above.

## 2025-05-03 NOTE — CARE PLAN
The patient is Stable - Low risk of patient condition declining or worsening    Shift Goals  Clinical Goals: volume correction, monitor labs  Patient Goals: rest  Family Goals: na    Progress made toward(s) clinical / shift goals:    Problem: Pain - Standard  Goal: Alleviation of pain or a reduction in pain to the patient’s comfort goal  Outcome: Progressing     Problem: Knowledge Deficit - Standard  Goal: Patient and family/care givers will demonstrate understanding of plan of care, disease process/condition, diagnostic tests and medications  Outcome: Progressing     Problem: Fall Risk  Goal: Patient will remain free from falls  Outcome: Progressing       Patient is not progressing towards the following goals:

## 2025-05-03 NOTE — PROGRESS NOTES
4 Eyes Skin Assessment Completed by OMID Cai and OMID Godinez.    Head WDL  Ears WDL  Nose WDL  Mouth WDL  Neck WDL  Breast/Chest WDL  Shoulder Blades WDL  Spine WDL  (R) Arm/Elbow/Hand Redness and Blanching  (L) Arm/Elbow/Hand Redness and Blanching  Abdomen WDL  Groin Redness and Excoriation  Scrotum/Coccyx/Buttocks Redness  (R) Leg WDL  (L) Leg WDL  (R) Heel/Foot/Toe callus and dirt  (L) Heel/Foot/Toe calus and dirt          Devices In Places Blood Pressure Cuff      Interventions In Place N/A    Possible Skin Injury No    Pictures Uploaded Into Epic Yes  Wound Consult Placed N/A  RN Wound Prevention Protocol Ordered No

## 2025-05-03 NOTE — RESPIRATORY CARE
" COPD EDUCATION by COPD CLINICAL EDUCATOR  5/3/2025 at 6:46 AM by Laura Aguilar, RRT     Patient reviewed by COPD education team. Patient does not have a history or diagnosis of COPD and is a non-smoker.  Therefore, patient does not qualify for the COPD program.      COPD Screen       COPD Assessment  COPD Clinical Specialists ONLY  COPD Education Initiated: No--Quick Screen (no hx of COPD, no home resp medications, quit smoking 2020)  Interdisciplinary Rounds: Attendance at Rounds (30 Min)    PFT Results    No results found for: \"PFT\"    Meds to Beds  [facility] provides bedside medication delivery for all eligible patients at discharge and you have been automatically enrolled in the Meds to Beds Program. Would you like to opt out of this program for any reason?: No - Stay Opted In             "

## 2025-05-03 NOTE — PROGRESS NOTES
Received report and assumed care of patient at change of shift. Patient is alert and oriented × 4, currently on RA. Patient reports no pain at this time. Initial assessment completed; Bed is in the lowest and locked position, call light and personal belongings placed within reach, and environment is safe and free of hazards. Fall risk precautions in place as appropriate. Patient verbalized understanding of plan of care and expressed no additional needs or concerns at this time. Will continue to monitor and reassess as needed.

## 2025-05-04 PROBLEM — Z71.89 ACP (ADVANCE CARE PLANNING): Status: RESOLVED | Noted: 2025-05-02 | Resolved: 2025-05-04

## 2025-05-04 LAB
ANION GAP SERPL CALC-SCNC: 11 MMOL/L (ref 7–16)
BUN SERPL-MCNC: 15 MG/DL (ref 8–22)
CALCIUM SERPL-MCNC: 8.8 MG/DL (ref 8.5–10.5)
CHLORIDE SERPL-SCNC: 100 MMOL/L (ref 96–112)
CO2 SERPL-SCNC: 21 MMOL/L (ref 20–33)
CREAT SERPL-MCNC: 1.03 MG/DL (ref 0.5–1.4)
EKG IMPRESSION: NORMAL
GFR SERPLBLD CREATININE-BSD FMLA CKD-EPI: 92 ML/MIN/1.73 M 2
GLUCOSE BLD STRIP.AUTO-MCNC: 154 MG/DL (ref 65–99)
GLUCOSE BLD STRIP.AUTO-MCNC: 203 MG/DL (ref 65–99)
GLUCOSE BLD STRIP.AUTO-MCNC: 224 MG/DL (ref 65–99)
GLUCOSE BLD STRIP.AUTO-MCNC: 228 MG/DL (ref 65–99)
GLUCOSE BLD STRIP.AUTO-MCNC: 286 MG/DL (ref 65–99)
GLUCOSE SERPL-MCNC: 244 MG/DL (ref 65–99)
MAGNESIUM SERPL-MCNC: 1.8 MG/DL (ref 1.5–2.5)
PHOSPHATE SERPL-MCNC: 2.8 MG/DL (ref 2.5–4.5)
POTASSIUM SERPL-SCNC: 4.1 MMOL/L (ref 3.6–5.5)
SODIUM SERPL-SCNC: 132 MMOL/L (ref 135–145)
TROPONIN T SERPL-MCNC: 18 NG/L (ref 6–19)

## 2025-05-04 PROCEDURE — A9270 NON-COVERED ITEM OR SERVICE: HCPCS | Performed by: HOSPITALIST

## 2025-05-04 PROCEDURE — 700111 HCHG RX REV CODE 636 W/ 250 OVERRIDE (IP): Mod: JZ | Performed by: INTERNAL MEDICINE

## 2025-05-04 PROCEDURE — 93010 ELECTROCARDIOGRAM REPORT: CPT | Performed by: INTERNAL MEDICINE

## 2025-05-04 PROCEDURE — 84100 ASSAY OF PHOSPHORUS: CPT

## 2025-05-04 PROCEDURE — 99233 SBSQ HOSP IP/OBS HIGH 50: CPT | Performed by: HOSPITALIST

## 2025-05-04 PROCEDURE — 82962 GLUCOSE BLOOD TEST: CPT | Mod: 91

## 2025-05-04 PROCEDURE — 770020 HCHG ROOM/CARE - TELE (206)

## 2025-05-04 PROCEDURE — 93005 ELECTROCARDIOGRAM TRACING: CPT | Mod: TC | Performed by: HOSPITALIST

## 2025-05-04 PROCEDURE — 84484 ASSAY OF TROPONIN QUANT: CPT

## 2025-05-04 PROCEDURE — 36415 COLL VENOUS BLD VENIPUNCTURE: CPT

## 2025-05-04 PROCEDURE — 700102 HCHG RX REV CODE 250 W/ 637 OVERRIDE(OP): Performed by: HOSPITALIST

## 2025-05-04 PROCEDURE — 83735 ASSAY OF MAGNESIUM: CPT

## 2025-05-04 PROCEDURE — 700111 HCHG RX REV CODE 636 W/ 250 OVERRIDE (IP): Performed by: HOSPITALIST

## 2025-05-04 PROCEDURE — 700102 HCHG RX REV CODE 250 W/ 637 OVERRIDE(OP): Performed by: INTERNAL MEDICINE

## 2025-05-04 PROCEDURE — A9270 NON-COVERED ITEM OR SERVICE: HCPCS | Performed by: INTERNAL MEDICINE

## 2025-05-04 PROCEDURE — 80048 BASIC METABOLIC PNL TOTAL CA: CPT

## 2025-05-04 RX ORDER — INSULIN LISPRO 100 [IU]/ML
2-9 INJECTION, SOLUTION INTRAVENOUS; SUBCUTANEOUS
Status: DISCONTINUED | OUTPATIENT
Start: 2025-05-04 | End: 2025-05-12 | Stop reason: HOSPADM

## 2025-05-04 RX ORDER — INSULIN LISPRO 100 [IU]/ML
3 INJECTION, SOLUTION INTRAVENOUS; SUBCUTANEOUS
Status: DISCONTINUED | OUTPATIENT
Start: 2025-05-04 | End: 2025-05-04

## 2025-05-04 RX ORDER — INSULIN LISPRO 100 [IU]/ML
3 INJECTION, SOLUTION INTRAVENOUS; SUBCUTANEOUS
Status: DISCONTINUED | OUTPATIENT
Start: 2025-05-04 | End: 2025-05-06

## 2025-05-04 RX ORDER — BENZOCAINE/MENTHOL 6 MG-10 MG
LOZENGE MUCOUS MEMBRANE 2 TIMES DAILY
Status: DISCONTINUED | OUTPATIENT
Start: 2025-05-04 | End: 2025-05-12 | Stop reason: HOSPADM

## 2025-05-04 RX ORDER — DEXTROSE MONOHYDRATE 25 G/50ML
25 INJECTION, SOLUTION INTRAVENOUS
Status: DISCONTINUED | OUTPATIENT
Start: 2025-05-04 | End: 2025-05-12 | Stop reason: HOSPADM

## 2025-05-04 RX ORDER — INSULIN LISPRO 100 [IU]/ML
4 INJECTION, SOLUTION INTRAVENOUS; SUBCUTANEOUS
Status: DISCONTINUED | OUTPATIENT
Start: 2025-05-04 | End: 2025-05-04

## 2025-05-04 RX ORDER — MAGNESIUM SULFATE HEPTAHYDRATE 40 MG/ML
4 INJECTION, SOLUTION INTRAVENOUS ONCE
Status: COMPLETED | OUTPATIENT
Start: 2025-05-04 | End: 2025-05-04

## 2025-05-04 RX ADMIN — INSULIN LISPRO 5 UNITS: 100 INJECTION, SOLUTION INTRAVENOUS; SUBCUTANEOUS at 10:46

## 2025-05-04 RX ADMIN — BUSPIRONE HYDROCHLORIDE 5 MG: 5 TABLET ORAL at 05:21

## 2025-05-04 RX ADMIN — NALTREXONE HYDROCHLORIDE 50 MG: 50 TABLET, FILM COATED ORAL at 05:21

## 2025-05-04 RX ADMIN — INSULIN LISPRO 2 UNITS: 100 INJECTION, SOLUTION INTRAVENOUS; SUBCUTANEOUS at 08:02

## 2025-05-04 RX ADMIN — SERTRALINE 200 MG: 100 TABLET, FILM COATED ORAL at 05:22

## 2025-05-04 RX ADMIN — FENOFIBRATE 134 MG: 134 CAPSULE ORAL at 05:21

## 2025-05-04 RX ADMIN — INSULIN GLARGINE-YFGN 55 UNITS: 100 INJECTION, SOLUTION SUBCUTANEOUS at 18:39

## 2025-05-04 RX ADMIN — LEVOTHYROXINE SODIUM 200 MCG: 0.2 TABLET ORAL at 05:21

## 2025-05-04 RX ADMIN — ATORVASTATIN CALCIUM 80 MG: 80 TABLET, FILM COATED ORAL at 20:11

## 2025-05-04 RX ADMIN — HYDROCORTISONE: 1 CREAM TOPICAL at 18:06

## 2025-05-04 RX ADMIN — ENOXAPARIN SODIUM 40 MG: 100 INJECTION SUBCUTANEOUS at 18:06

## 2025-05-04 RX ADMIN — DIVALPROEX SODIUM 500 MG: 500 TABLET, DELAYED RELEASE ORAL at 05:22

## 2025-05-04 RX ADMIN — INSULIN LISPRO 3 UNITS: 100 INJECTION, SOLUTION INTRAVENOUS; SUBCUTANEOUS at 10:45

## 2025-05-04 RX ADMIN — DIVALPROEX SODIUM 1500 MG: 500 TABLET, DELAYED RELEASE ORAL at 18:07

## 2025-05-04 RX ADMIN — INSULIN LISPRO 3 UNITS: 100 INJECTION, SOLUTION INTRAVENOUS; SUBCUTANEOUS at 18:39

## 2025-05-04 RX ADMIN — INSULIN LISPRO 3 UNITS: 100 INJECTION, SOLUTION INTRAVENOUS; SUBCUTANEOUS at 21:44

## 2025-05-04 RX ADMIN — MAGNESIUM SULFATE HEPTAHYDRATE 4 G: 4 INJECTION, SOLUTION INTRAVENOUS at 14:46

## 2025-05-04 RX ADMIN — BUSPIRONE HYDROCHLORIDE 5 MG: 5 TABLET ORAL at 18:07

## 2025-05-04 RX ADMIN — POTASSIUM CHLORIDE 40 MEQ: 1500 TABLET, EXTENDED RELEASE ORAL at 05:21

## 2025-05-04 RX ADMIN — LURASIDONE HYDROCHLORIDE 80 MG: 80 TABLET, FILM COATED ORAL at 20:11

## 2025-05-04 RX ADMIN — ASPIRIN 81 MG: 81 TABLET, CHEWABLE ORAL at 05:21

## 2025-05-04 ASSESSMENT — FIBROSIS 4 INDEX
FIB4 SCORE: 0.85
FIB4 SCORE: 0.85

## 2025-05-04 ASSESSMENT — ENCOUNTER SYMPTOMS
FEVER: 0
SHORTNESS OF BREATH: 0
NAUSEA: 0
WEAKNESS: 1
CHILLS: 0
VOMITING: 0

## 2025-05-04 ASSESSMENT — PAIN DESCRIPTION - PAIN TYPE
TYPE: ACUTE PAIN

## 2025-05-04 NOTE — PROGRESS NOTES
Received bedside report and assumed care of patient at change of shift. Patient is alert and oriented × 4, currently on RA. Patient calmly sleeping at this time. Initial assessment completed; Bed is in the lowest and locked position, call light and personal belongings placed within reach, and environment is safe and free of hazards. Fall risk precautions in place as appropriate. Patient verbalized understanding of plan of care and expressed no additional needs or concerns at this time. Will continue to monitor and reassess as needed.

## 2025-05-04 NOTE — HOSPITAL COURSE
This is a 44 y.o. male admitted 5/2/2025 with PTSD, type 2 diabetes mellitus not controlled, major depressive disorder, insomnia , hypothyroidism status post thyroidectomy, seizure disorder who comes into the hospital with generalized weakness.  On arrival to ED patient noted to have hyponatremia, hyperglycemia, significantly elevated A1c 18.9, significantly elevated TSH 60, free T4 less than 0.11.  Initially was admitted under medical for and was transferred telemetry for evaluation of chest pain.  Subsequent EKG with bradycardia with nonspecific ST changes, negative troponin.       oral

## 2025-05-04 NOTE — PROGRESS NOTES
Assumed patient care and received bedside report from Day RN. Patient is A&Ox4 and reports no pain at this time. Non-pharmacological interventions encouraged like ice/heat pack. Patient on RA, BL is RA. Patient is drowsy, and alert.    Patient educated on the use of the call light and verbalized understanding. Bed in the lowest and locked position with alarm on. Personal belongings and call light within reach. High Fall Risk precautions and hourly rounding in place. Safety education provided. POC discussed and patient declines any further needs at this time. Orders carried out.

## 2025-05-04 NOTE — PROGRESS NOTES
Hospital Medicine Daily Progress Note    Date of Service  5/4/2025    Chief Complaint  This is a 44 y.o. male admitted 5/2/2025 with PTSD, type 2 diabetes mellitus not controlled, major depressive disorder, insomnia who comes into the hospital with generalized weakness.      He was also found to have an A1c of 19 and undetectable T4 level. He is adamant that he takes his thyroid medication appropriately.     Patient was admitted to the CDU and was transferred to medical floor 5/3 3.  Patient stated that he has been having chest pain, EKG was obtained, noted to be recently prolonged QTc BG.  Have prolonged QTc of 680.  Upon close review of EKG, his QTc is not prolonged.    Interval events:  -- Patient is alert, awake and answering questions appropriately, Vital sign  has been reviewed, labs reviewed, patient magnesium is noted to 1.8, provide 2 g of IV magnesium. Upon careful examination of EKG, it did not look like patient had a prolonged QTc  -- he complained of Chest pain-troponin has been ordered, unremarkable,  continue asa/ statin, transfer to telemetry    --He has uncontrolled type 2 diabetes mellitus, continue ISS, long-acting insulin, hypertension protocol, Accu-Cheks ACH S.  Patient with diabetic education prior to discharge.  Patient has been noncompliant with his thyroid medication and diabetic medical    Patient noted to have TSS 60,  free t4 less than 0.11, started on levothyroxine 200.  Patient was instructed to take levothyroxine on empty stomach    I have discussed this patient's plan of care and discharge plan at IDT rounds today with Case Management, Nursing, Nursing leadership, and other members of the IDT team.    Consultants/Specialty  none    Code Status  Full Code    Disposition  The patient is not medically cleared for discharge to home or a post-acute facility.  Anticipate discharge to: home with organized home healthcare and close outpatient follow-up    I have placed the appropriate orders  for post-discharge needs.    Review of Systems  Review of Systems   Constitutional:  Negative for chills and fever.   Respiratory:  Negative for shortness of breath.    Cardiovascular:  Negative for chest pain.   Gastrointestinal:  Negative for nausea and vomiting.        Weak, poor appetite   Neurological:  Positive for weakness (generalized).        Physical Exam  Temp:  [35.9 °C (96.6 °F)-36.6 °C (97.9 °F)] 36.5 °C (97.7 °F)  Pulse:  [52-71] 71  Resp:  [17-18] 18  BP: (102-122)/(66-82) 122/82  SpO2:  [90 %-95 %] 92 %    Physical Exam  Vitals and nursing note reviewed.   Constitutional:       Appearance: He is well-developed. He is obese.      Comments: Very weak appearing  Flat affect  Low speech tone   HENT:      Head: Normocephalic and atraumatic.   Eyes:      Pupils: Pupils are equal, round, and reactive to light.   Neck:      Thyroid: No thyromegaly.   Cardiovascular:      Rate and Rhythm: Normal rate.      Heart sounds: Normal heart sounds. No murmur heard.  Pulmonary:      Effort: Pulmonary effort is normal. No respiratory distress.      Breath sounds: Normal breath sounds.   Abdominal:      General: Bowel sounds are normal. There is no distension.      Palpations: Abdomen is soft.      Tenderness: There is no abdominal tenderness.   Musculoskeletal:      Cervical back: Neck supple.      Right lower leg: No edema.      Left lower leg: No edema.   Skin:     Findings: No rash.      Comments: Xerosis   Neurological:      Mental Status: He is alert and oriented to person, place, and time.      Cranial Nerves: No cranial nerve deficit.         Fluids    Intake/Output Summary (Last 24 hours) at 5/4/2025 1505  Last data filed at 5/4/2025 1335  Gross per 24 hour   Intake 570 ml   Output 1450 ml   Net -880 ml        Laboratory  Recent Labs     05/02/25  1346 05/03/25  0036   WBC 8.7 12.1*   RBC 4.36* 4.12*   HEMOGLOBIN 13.4* 12.4*   HEMATOCRIT 37.9* 36.9*   MCV 86.9 89.6   MCH 30.7 30.1   MCHC 35.4 33.6   RDW 44.9  46.5   PLATELETCT 310 287   MPV 10.7 10.6     Recent Labs     05/02/25  1346 05/03/25  0036 05/04/25  0110   SODIUM 127* 131* 132*   POTASSIUM 3.7 3.0* 4.1   CHLORIDE 94* 98 100   CO2 20 24 21   GLUCOSE 293* 187* 244*   BUN 16 14 15   CREATININE 1.03 0.95 1.03   CALCIUM 8.9 8.4* 8.8                   Imaging  No orders to display        Assessment/Plan  * Hyponatremia- (present on admission)  Assessment & Plan  Likely  secondary to severe dehydration and severe hypothyroidism, sodium improving at 132, monitor      Hypothyroidism- (present on admission)  Assessment & Plan  TSH elevated at 60, T4 is undetectable  Patient is adamant that he takes it daily and on ES  Increase synthroid to 200 mcg daily  Recheck TFT's in 4- 6 wks    Primary hypertension- (present on admission)  Assessment & Plan  No current medications  Remains on the low side, monitor closely    Class 1 obesity due to excess calories with serious comorbidity and body mass index (BMI) of 32.0 to 32.9 in adult- (present on admission)  Assessment & Plan  Outpatient nutritional counseling    Type 2 diabetes mellitus without complication, without long-term current use of insulin (HCC)- (present on admission)  Assessment & Plan  Poor control, BSs are somewhat better today, I personally reviewed these labs on 5/3  A1c is 19, education given  Hold outpatient scheduled lispro  Continue lantus 55 units QHS, ISS, hypoglycemia protocol along with 4 units 3 times daily with meals   ---- adjusting insulin    PTSD (post-traumatic stress disorder)- (present on admission)  Assessment & Plan  Psych meds as noted above    Anemia- (present on admission)  Assessment & Plan  Monitor hemoglobin hematocrit, hemoglobin at 12.4    Anxiety and depression- (present on admission)  Assessment & Plan  Not well controlled, but no SI nor HI  Continue buspar,and lurasidone         VTE prophylaxis: lovenox    Total time spent  51 minutes. I spent greater than 50% of the time for patient  care, counseling, and coordination on this date, including unit/floor time, and face-to-face time with the patient as per interval events, my own review of patient's imaging and lab analysis and developing my assessment and plan above.

## 2025-05-04 NOTE — PROGRESS NOTES
4 Eyes Skin Assessment Completed by OMID Bear and ANA Osei.    Head WDL  Ears WDL  Nose WDL  Mouth WDL  Neck WDL  Breast/Chest WDL  Shoulder Blades WDL  Spine red blotchy rash w plaques  (R) Arm/Elbow/Hand Redness and Blanching  (L) Arm/Elbow/Hand Redness and Blanching  Abdomen Redness, Blanching, and Rash  Groin Redness, Blanching, and Rash  Scrotum/Coccyx/Buttocks Redness and Blanching  (R) Leg WDL  (L) Leg red raised rash on inner thigh  (R) Heel/Foot/Toe Dirty, Redness and Blanching  (L) Heel/Foot/Toe Dirty, Redness and Blanching                            Devices In Places Tele Box and Pulse Ox      Interventions In Place Pillows    Possible Skin Injury Yes    Pictures Uploaded Into Epic Yes  Wound Consult Placed Yes  RN Wound Prevention Protocol Ordered Yes

## 2025-05-04 NOTE — CARE PLAN
The patient is Stable - Low risk of patient condition declining or worsening    Shift Goals  Clinical Goals: Monitor sodium,free from fall  Patient Goals: sleep, rest, comfort  Family Goals: JUANCARLOS    Progress made toward(s) clinical / shift goals:  Na 132 today, slowly trending up, pt remained free from fall, calls appropriately for help, bed alarm on at all times, vital signs and blood sugar stable    Patient is not progressing towards the following goals:

## 2025-05-05 ENCOUNTER — APPOINTMENT (OUTPATIENT)
Dept: CARDIOLOGY | Facility: MEDICAL CENTER | Age: 45
DRG: 641 | End: 2025-05-05
Attending: STUDENT IN AN ORGANIZED HEALTH CARE EDUCATION/TRAINING PROGRAM
Payer: MEDICAID

## 2025-05-05 PROBLEM — M62.81 MUSCLE WEAKNESS (GENERALIZED): Status: ACTIVE | Noted: 2025-05-05

## 2025-05-05 PROBLEM — R07.9 CHEST PAIN: Status: ACTIVE | Noted: 2025-05-05

## 2025-05-05 LAB
ALBUMIN SERPL BCP-MCNC: 3.8 G/DL (ref 3.2–4.9)
ALBUMIN/GLOB SERPL: 1.7 G/DL
ALP SERPL-CCNC: 84 U/L (ref 30–99)
ALT SERPL-CCNC: 12 U/L (ref 2–50)
ANION GAP SERPL CALC-SCNC: 10 MMOL/L (ref 7–16)
AST SERPL-CCNC: 22 U/L (ref 12–45)
BASOPHILS # BLD AUTO: 0.4 % (ref 0–1.8)
BASOPHILS # BLD: 0.03 K/UL (ref 0–0.12)
BILIRUB CONJ SERPL-MCNC: <0.2 MG/DL (ref 0.1–0.5)
BILIRUB INDIRECT SERPL-MCNC: NORMAL MG/DL (ref 0–1)
BILIRUB SERPL-MCNC: 0.4 MG/DL (ref 0.1–1.5)
BUN SERPL-MCNC: 11 MG/DL (ref 8–22)
CALCIUM ALBUM COR SERPL-MCNC: 9 MG/DL (ref 8.5–10.5)
CALCIUM SERPL-MCNC: 8.8 MG/DL (ref 8.5–10.5)
CHLORIDE SERPL-SCNC: 100 MMOL/L (ref 96–112)
CK SERPL-CCNC: 92 U/L (ref 0–154)
CO2 SERPL-SCNC: 26 MMOL/L (ref 20–33)
CORTIS SERPL-MCNC: 17 UG/DL (ref 0–23)
CREAT SERPL-MCNC: 1.12 MG/DL (ref 0.5–1.4)
EOSINOPHIL # BLD AUTO: 0.08 K/UL (ref 0–0.51)
EOSINOPHIL NFR BLD: 1.1 % (ref 0–6.9)
ERYTHROCYTE [DISTWIDTH] IN BLOOD BY AUTOMATED COUNT: 48.3 FL (ref 35.9–50)
GFR SERPLBLD CREATININE-BSD FMLA CKD-EPI: 84 ML/MIN/1.73 M 2
GLOBULIN SER CALC-MCNC: 2.3 G/DL (ref 1.9–3.5)
GLUCOSE BLD STRIP.AUTO-MCNC: 114 MG/DL (ref 65–99)
GLUCOSE BLD STRIP.AUTO-MCNC: 121 MG/DL (ref 65–99)
GLUCOSE BLD STRIP.AUTO-MCNC: 140 MG/DL (ref 65–99)
GLUCOSE BLD STRIP.AUTO-MCNC: 195 MG/DL (ref 65–99)
GLUCOSE SERPL-MCNC: 115 MG/DL (ref 65–99)
HCT VFR BLD AUTO: 38.6 % (ref 42–52)
HGB BLD-MCNC: 13.3 G/DL (ref 14–18)
IMM GRANULOCYTES # BLD AUTO: 0.05 K/UL (ref 0–0.11)
IMM GRANULOCYTES NFR BLD AUTO: 0.7 % (ref 0–0.9)
LACTATE SERPL-SCNC: 1.7 MMOL/L (ref 0.5–2)
LV EJECT FRACT  99904: 55
LV EJECT FRACT MOD 2C 99903: 55.37
LV EJECT FRACT MOD 4C 99902: 58.33
LV EJECT FRACT MOD BP 99901: 56.83
LYMPHOCYTES # BLD AUTO: 2.28 K/UL (ref 1–4.8)
LYMPHOCYTES NFR BLD: 32.1 % (ref 22–41)
MAGNESIUM SERPL-MCNC: 2.2 MG/DL (ref 1.5–2.5)
MCH RBC QN AUTO: 30.6 PG (ref 27–33)
MCHC RBC AUTO-ENTMCNC: 34.5 G/DL (ref 32.3–36.5)
MCV RBC AUTO: 88.9 FL (ref 81.4–97.8)
MONOCYTES # BLD AUTO: 0.41 K/UL (ref 0–0.85)
MONOCYTES NFR BLD AUTO: 5.8 % (ref 0–13.4)
NEUTROPHILS # BLD AUTO: 4.25 K/UL (ref 1.82–7.42)
NEUTROPHILS NFR BLD: 59.9 % (ref 44–72)
NRBC # BLD AUTO: 0 K/UL
NRBC BLD-RTO: 0 /100 WBC (ref 0–0.2)
PHOSPHATE SERPL-MCNC: 3.1 MG/DL (ref 2.5–4.5)
PLATELET # BLD AUTO: 247 K/UL (ref 164–446)
PMV BLD AUTO: 10.7 FL (ref 9–12.9)
POTASSIUM SERPL-SCNC: 3.7 MMOL/L (ref 3.6–5.5)
PROCALCITONIN SERPL-MCNC: 0.12 NG/ML
PROT SERPL-MCNC: 6.1 G/DL (ref 6–8.2)
RBC # BLD AUTO: 4.34 M/UL (ref 4.7–6.1)
SODIUM SERPL-SCNC: 136 MMOL/L (ref 135–145)
WBC # BLD AUTO: 7.1 K/UL (ref 4.8–10.8)

## 2025-05-05 PROCEDURE — 700101 HCHG RX REV CODE 250: Performed by: STUDENT IN AN ORGANIZED HEALTH CARE EDUCATION/TRAINING PROGRAM

## 2025-05-05 PROCEDURE — 84100 ASSAY OF PHOSPHORUS: CPT

## 2025-05-05 PROCEDURE — 700102 HCHG RX REV CODE 250 W/ 637 OVERRIDE(OP): Performed by: HOSPITALIST

## 2025-05-05 PROCEDURE — 93306 TTE W/DOPPLER COMPLETE: CPT | Mod: 26 | Performed by: INTERNAL MEDICINE

## 2025-05-05 PROCEDURE — 82533 TOTAL CORTISOL: CPT

## 2025-05-05 PROCEDURE — 87040 BLOOD CULTURE FOR BACTERIA: CPT

## 2025-05-05 PROCEDURE — A9270 NON-COVERED ITEM OR SERVICE: HCPCS | Performed by: INTERNAL MEDICINE

## 2025-05-05 PROCEDURE — 82550 ASSAY OF CK (CPK): CPT

## 2025-05-05 PROCEDURE — 83735 ASSAY OF MAGNESIUM: CPT

## 2025-05-05 PROCEDURE — 36415 COLL VENOUS BLD VENIPUNCTURE: CPT

## 2025-05-05 PROCEDURE — 82248 BILIRUBIN DIRECT: CPT

## 2025-05-05 PROCEDURE — 99233 SBSQ HOSP IP/OBS HIGH 50: CPT | Performed by: STUDENT IN AN ORGANIZED HEALTH CARE EDUCATION/TRAINING PROGRAM

## 2025-05-05 PROCEDURE — 82962 GLUCOSE BLOOD TEST: CPT | Mod: 91

## 2025-05-05 PROCEDURE — 85025 COMPLETE CBC W/AUTO DIFF WBC: CPT

## 2025-05-05 PROCEDURE — 700105 HCHG RX REV CODE 258: Performed by: STUDENT IN AN ORGANIZED HEALTH CARE EDUCATION/TRAINING PROGRAM

## 2025-05-05 PROCEDURE — 770020 HCHG ROOM/CARE - TELE (206)

## 2025-05-05 PROCEDURE — 700105 HCHG RX REV CODE 258

## 2025-05-05 PROCEDURE — 83605 ASSAY OF LACTIC ACID: CPT

## 2025-05-05 PROCEDURE — 84145 PROCALCITONIN (PCT): CPT

## 2025-05-05 PROCEDURE — A9270 NON-COVERED ITEM OR SERVICE: HCPCS | Performed by: HOSPITALIST

## 2025-05-05 PROCEDURE — 700111 HCHG RX REV CODE 636 W/ 250 OVERRIDE (IP): Mod: JZ | Performed by: INTERNAL MEDICINE

## 2025-05-05 PROCEDURE — 93306 TTE W/DOPPLER COMPLETE: CPT

## 2025-05-05 PROCEDURE — 80053 COMPREHEN METABOLIC PANEL: CPT

## 2025-05-05 PROCEDURE — 700102 HCHG RX REV CODE 250 W/ 637 OVERRIDE(OP): Performed by: INTERNAL MEDICINE

## 2025-05-05 RX ORDER — SODIUM CHLORIDE, SODIUM LACTATE, POTASSIUM CHLORIDE, AND CALCIUM CHLORIDE .6; .31; .03; .02 G/100ML; G/100ML; G/100ML; G/100ML
500 INJECTION, SOLUTION INTRAVENOUS ONCE
Status: COMPLETED | OUTPATIENT
Start: 2025-05-05 | End: 2025-05-05

## 2025-05-05 RX ADMIN — BUSPIRONE HYDROCHLORIDE 5 MG: 5 TABLET ORAL at 17:38

## 2025-05-05 RX ADMIN — ENOXAPARIN SODIUM 40 MG: 100 INJECTION SUBCUTANEOUS at 17:38

## 2025-05-05 RX ADMIN — ATORVASTATIN CALCIUM 80 MG: 80 TABLET, FILM COATED ORAL at 21:26

## 2025-05-05 RX ADMIN — INSULIN LISPRO 2 UNITS: 100 INJECTION, SOLUTION INTRAVENOUS; SUBCUTANEOUS at 11:17

## 2025-05-05 RX ADMIN — ASPIRIN 81 MG: 81 TABLET, CHEWABLE ORAL at 04:23

## 2025-05-05 RX ADMIN — LURASIDONE HYDROCHLORIDE 80 MG: 80 TABLET, FILM COATED ORAL at 17:38

## 2025-05-05 RX ADMIN — SODIUM CHLORIDE, POTASSIUM CHLORIDE, SODIUM LACTATE AND CALCIUM CHLORIDE 500 ML: 600; 310; 30; 20 INJECTION, SOLUTION INTRAVENOUS at 04:32

## 2025-05-05 RX ADMIN — INSULIN LISPRO 3 UNITS: 100 INJECTION, SOLUTION INTRAVENOUS; SUBCUTANEOUS at 17:39

## 2025-05-05 RX ADMIN — INSULIN GLARGINE-YFGN 55 UNITS: 100 INJECTION, SOLUTION SUBCUTANEOUS at 17:39

## 2025-05-05 RX ADMIN — DIVALPROEX SODIUM 1500 MG: 500 TABLET, DELAYED RELEASE ORAL at 17:38

## 2025-05-05 RX ADMIN — BUSPIRONE HYDROCHLORIDE 5 MG: 5 TABLET ORAL at 04:24

## 2025-05-05 RX ADMIN — LEVOTHYROXINE SODIUM ANHYDROUS 0.8 MCG/KG/HR: 100 INJECTION, POWDER, LYOPHILIZED, FOR SOLUTION INTRAVENOUS at 11:14

## 2025-05-05 RX ADMIN — INSULIN LISPRO 3 UNITS: 100 INJECTION, SOLUTION INTRAVENOUS; SUBCUTANEOUS at 11:18

## 2025-05-05 RX ADMIN — INSULIN LISPRO 3 UNITS: 100 INJECTION, SOLUTION INTRAVENOUS; SUBCUTANEOUS at 07:52

## 2025-05-05 RX ADMIN — LEVOTHYROXINE SODIUM 200 MCG: 0.2 TABLET ORAL at 04:23

## 2025-05-05 RX ADMIN — DIVALPROEX SODIUM 500 MG: 500 TABLET, DELAYED RELEASE ORAL at 04:23

## 2025-05-05 RX ADMIN — HYDROCORTISONE: 1 CREAM TOPICAL at 17:38

## 2025-05-05 RX ADMIN — FENOFIBRATE 134 MG: 134 CAPSULE ORAL at 04:23

## 2025-05-05 ASSESSMENT — ENCOUNTER SYMPTOMS
DIZZINESS: 0
COUGH: 0
SPUTUM PRODUCTION: 0
NAUSEA: 0

## 2025-05-05 ASSESSMENT — FIBROSIS 4 INDEX: FIB4 SCORE: 0.85

## 2025-05-05 ASSESSMENT — PAIN DESCRIPTION - PAIN TYPE
TYPE: ACUTE PAIN
TYPE: ACUTE PAIN

## 2025-05-05 NOTE — ASSESSMENT & PLAN NOTE
Insetting of severe hypothyroidism  Patient receiving IV levothyroxine followed by oral levothyroxine  Encourage mobility

## 2025-05-05 NOTE — CARE PLAN
The patient is Stable - Low risk of patient condition declining or worsening    Shift Goals  Clinical Goals: VSS, monitor labs, monitor HR  Patient Goals: rest  Family Goals: kristen    Progress made toward(s) clinical / shift goals:    Problem: Pain - Standard  Goal: Alleviation of pain or a reduction in pain to the patient’s comfort goal  Description: Target End Date:  Prior to discharge or change in level of careDocument on Vitals flowsheet1.  Document pain using the appropriate pain scale per order or unit policy2.  Educate and implement non-pharmacologic comfort measures (i.e. relaxation, distraction, massage, cold/heat therapy, etc.)3.  Pain management medications as ordered4.  Reassess pain after pain med administration per policy5.  If opiods administered assess patient's response to pain medication is appropriate per POSS sedation scale6.  Follow pain management plan developed in collaboration with patient and interdisciplinary team (including palliative care or pain specialists if applicable)  Outcome: Progressing     Problem: Knowledge Deficit - Standard  Goal: Patient and family/care givers will demonstrate understanding of plan of care, disease process/condition, diagnostic tests and medications  Description: Target End Date:  1-3 days or as soon as patient condition allowsDocument in Patient Education1.  Patient and family/caregiver oriented to unit, equipment, visitation policy and means for communicating concern2.  Complete/review Learning Assessment3.  Assess knowledge level of disease process/condition, treatment plan, diagnostic tests and medications4.  Explain disease process/condition, treatment plan, diagnostic tests and medications  Outcome: Progressing     Problem: Fall Risk  Goal: Patient will remain free from falls  Description: Target End Date:  Prior to discharge or change in level of careDocument interventions on the Mariluz Ball Fall Risk Assessment1.  Assess for fall risk factors2.   Implement fall precautions  Outcome: Progressing

## 2025-05-05 NOTE — PROGRESS NOTES
Bedside report received from off going RN/tech: Dwayne, assumed care of patient.     Fall Risk Score: HIGH RISK  Fall risk interventions in place: Place yellow fall risk ID band on patient, Provide patient/family education based on risk assessment, Educate patient/family to call staff for assistance when getting out of bed, Place fall precaution signage outside patient door, Place patient in room close to nursing station, Utilize bed/chair fall alarm, and Bed alarm connected correctly  Bed type: Regular (Demar Score less than 17 interventions in place)  Patient on cardiac monitor: Yes  IVF/IV medications: Not Applicable   Oxygen: Room Air  Bedside sitter: Not Applicable   Isolation: Not applicable

## 2025-05-05 NOTE — DISCHARGE PLANNING
"Community Health Worker Intake    Identified no barriers.  Contact information provided to Norm Prabhakar   Has PCP appointment scheduled for (pt and  Renetta to schedule)  Inpatient assessment completed.    CHW Lisbeth re-introduced community care management to the patient.     Pt states that he is living in an apartment complex, [facility].   Pt visits local food pantries and utilizes his SNAP benefits.   Pt takes the [facility] bus for transportation.     Pt states that he was \"distracted\" and forgot to take his insulin 2 times.   Hospitalist requested for diabetic educator to visit the patient.   Pt states that he has a  named Renetta through [facility] who helps schedule PCP follow up and assists with social barriers.   CHW called and left a voicemail per patient's request. [phone].     Plan: CHW will wait for a call back from the patient's . CHW will not follow outpatient.     "

## 2025-05-05 NOTE — ASSESSMENT & PLAN NOTE
The ASCVD Risk score (Kelley MCLEAN, et al., 2019) failed to calculate for the following reasons:    Risk score cannot be calculated because patient has a medical history suggesting prior/existing ASCVD      Unlikely ACS  Had 1 episode of chest pain, no chest pain-free, remained asymptomatic  Troponin negative, EKG unremarkable  Telemetry monitoring  Echocardiogram for evaluation of chest pain    5/6: reports ongoing chest pressure  EKG NSR, nonspecific ST-T changes   echo reviewed EF 55%, normal RV size and function  Tele

## 2025-05-05 NOTE — DISCHARGE PLANNING
CHW Lisbeth received a call back from Diane- patient's  / direct support.   She states that she provides weekly medication management, provides transportation to doctors appointment's / the grocery store, and schedules his follow up.   Diane requested a call from bedside nursing. Pippa sent.     CHW will no longer follow.

## 2025-05-05 NOTE — PROGRESS NOTES
Bedside report received from off going RN/tech: Chema, assumed care of patient.     Fall Risk Score: HIGH RISK  Fall risk interventions in place: Place yellow fall risk ID band on patient, Provide patient/family education based on risk assessment, Educate patient/family to call staff for assistance when getting out of bed, Place fall precaution signage outside patient door, Place patient in room close to nursing station, Utilize bed/chair fall alarm, and Bed alarm connected correctly  Bed type: Regular (Demar Score less than 17 interventions in place)  Patient on cardiac monitor: Yes  IVF/IV medications: Not Applicable   Oxygen: Room Air  Bedside sitter: Not Applicable   Isolation: Not applicable

## 2025-05-05 NOTE — PROGRESS NOTES
Hospital Medicine Daily Progress Note    Date of Service  5/5/2025    Chief Complaint  Norm Prabhakar is a 44 y.o. male admitted 5/2/2025 with generalized weakness    Hospital Course  This is a 44 y.o. male admitted 5/2/2025 with PTSD, type 2 diabetes mellitus not controlled, major depressive disorder, insomnia , hypothyroidism status post thyroidectomy, seizure disorder who comes into the hospital with generalized weakness.  On arrival to ED patient noted to have hyponatremia, hyperglycemia, significantly elevated A1c 18.9, significantly elevated TSH 60, free T4 less than 0.11.  Initially was admitted under medical for and was transferred telemetry for evaluation of chest pain.  Subsequent EKG with bradycardia with nonspecific ST changes, negative troponin.       Interval Problem Update    5/5/2025  Seen and examined at bedside  Patient continued to complain of worsening generalized weakness  Denies chest pain, shortness of breath, nausea/vomiting  Labs reviewed noted to have normal white count, sodium 133 potassium 3.7, significantly elevated TSH, low free T4 less than 0.1 normal troponin level, fingerstick 114, cortisol level 17, CPK 92  EKG noted sinus bradycardia  Chart reviewed, meds reviewed  Plan  Telemetry monitoring  Echocardiogram for evaluation of chest pain  I have started patient on 1 dose of 200 mcg IV thyroxine for ongoing symptomatic hypothyroidism.  I advised him to follow-up with endocrinology as outpatient for better management of hypothyroidism and diabetes.  Patient verbalized understanding.  Continue on glargine 55 units with sliding scale.  Repeat BMP in a.m. to monitor electrolyte, renal function  High risk for deterioration from ongoing severe hypothyroidism.  Need close monitoring    I have discussed this patient's plan of care and discharge plan at IDT rounds today with Case Management, Nursing, Nursing leadership, and other members of the IDT team.    C    Code Status  Full  Code    Disposition  The patient is not medically cleared for discharge to home or a post-acute facility.  Anticipate discharge to: home with close outpatient follow-up    I have placed the appropriate orders for post-discharge needs.    Review of Systems  Review of Systems   Constitutional:  Positive for malaise/fatigue.   Respiratory:  Negative for cough and sputum production.    Cardiovascular:  Negative for chest pain.   Gastrointestinal:  Negative for nausea.   Neurological:  Negative for dizziness.        Physical Exam  Temp:  [36 °C (96.8 °F)-36.5 °C (97.7 °F)] 36.4 °C (97.5 °F)  Pulse:  [63-73] 66  Resp:  [16-18] 16  BP: ()/(46-82) 111/75  SpO2:  [92 %-95 %] 93 %    Physical Exam  Constitutional:       Appearance: He is obese. He is ill-appearing.   HENT:      Mouth/Throat:      Mouth: Mucous membranes are dry.   Eyes:      Comments: Glaucoma noted   Cardiovascular:      Rate and Rhythm: Normal rate.   Abdominal:      General: Abdomen is flat. There is no distension.   Neurological:      Mental Status: He is alert.      Comments: Overall generalized illness, moving all extremities         Fluids    Intake/Output Summary (Last 24 hours) at 5/5/2025 1027  Last data filed at 5/5/2025 1000  Gross per 24 hour   Intake 1290 ml   Output 1800 ml   Net -510 ml        Laboratory  Recent Labs     05/02/25  1346 05/03/25  0036 05/05/25  0754   WBC 8.7 12.1* 7.1   RBC 4.36* 4.12* 4.34*   HEMOGLOBIN 13.4* 12.4* 13.3*   HEMATOCRIT 37.9* 36.9* 38.6*   MCV 86.9 89.6 88.9   MCH 30.7 30.1 30.6   MCHC 35.4 33.6 34.5   RDW 44.9 46.5 48.3   PLATELETCT 310 287 247   MPV 10.7 10.6 10.7     Recent Labs     05/03/25  0036 05/04/25  0110 05/05/25  0754   SODIUM 131* 132* 136   POTASSIUM 3.0* 4.1 3.7   CHLORIDE 98 100 100   CO2 24 21 26   GLUCOSE 187* 244* 115*   BUN 14 15 11   CREATININE 0.95 1.03 1.12   CALCIUM 8.4* 8.8 8.8                   Imaging  EC-ECHOCARDIOGRAM COMPLETE W/O CONT    (Results Pending)         Assessment/Plan  * Hyponatremia- (present on admission)  Assessment & Plan  Likely  secondary to severe dehydration and severe hypothyroidism, sodium improving at 132, monitor  Monitor labs in a.m.        Muscle weakness (generalized)  Assessment & Plan  Insetting of severe hypothyroidism  Patient receiving IV levothyroxine followed by oral levothyroxine    Chest pain  Assessment & Plan  The ASCVD Risk score (Kelley MCLEAN, et al., 2019) failed to calculate for the following reasons:    Risk score cannot be calculated because patient has a medical history suggesting prior/existing ASCVD      Unlikely ACS  Had 1 episode of chest pain, no chest pain-free, remained asymptomatic  Troponin negative, EKG unremarkable  Telemetry monitoring  Echocardiogram for evaluation of chest pain      Hypothyroidism- (present on admission)  Assessment & Plan  TSH elevated at 60, T4 is undetectable  Patient is adamant that he takes it daily and on ES  Increase synthroid to 200 mcg daily  Recheck TFT's in 4- 6 wks    5/5/2025  Symptomatic hypothyroidism  Currently no concern for myxedema,  Continue complain of worsening generalized weakness  I have started patient on 1 dose of 200 mcg IV thyroxine for ongoing symptomatic hypothyroidism.  I advised him to follow-up with endocrinology as outpatient for better management of hypothyroidism and diabetes.  Patient verbalized understanding.    Seizure disorder (HCC)- (present on admission)  Assessment & Plan  Continue on Depakote    Primary hypertension- (present on admission)  Assessment & Plan  No current medications  Remains on the low side, monitor closely    Class 1 obesity due to excess calories with serious comorbidity and body mass index (BMI) of 32.0 to 32.9 in adult- (present on admission)  Assessment & Plan  Outpatient nutritional counseling    Type 2 diabetes mellitus without complication, without long-term current use of insulin (HCC)- (present on admission)  Assessment &  Plan  Poor control, BSs are somewhat better today, I personally reviewed these labs on 5/3  A1c is 19, education given  Hold outpatient scheduled lispro  Continue lantus 55 units QHS, ISS, hypoglycemia protocol along with 4 units 3 times daily with meals   ---- adjusting insulin  Advised to follow-up with outpatient endocrinology    PTSD (post-traumatic stress disorder)- (present on admission)  Assessment & Plan  Psych meds as noted above    Anemia- (present on admission)  Assessment & Plan  Monitor hemoglobin hematocrit, hemoglobin at 12.4    Anxiety and depression- (present on admission)  Assessment & Plan  Not well controlled, but no SI nor HI  Continue buspar,and lurasidone    Postoperative hypothyroidism- (present on admission)  Assessment & Plan  S/p thyroidectomy  He is following with endocrine as outpatient         VTE prophylaxis: lovenox    I have performed a physical exam and reviewed and updated ROS and Plan today (5/5/2025). In review of yesterday's note (5/4/2025), there are no changes except as documented above.         Greater than 53 minutes spent preparing to see patient (e.g. review of tests) obtaining and/or reviewing separately obtained history. Performing a medically appropriate examination and/ evaluation.  Counseling and educating the patient/family/caregiver.  Ordering medications, tests, or procedures.  Referring and communicating with other health care professionals.  Documenting clinical information in EPIC.  Independently interpreting results and communicating results to patient/family/caregiver.  Care coordination.  Echocardiogram ordered for further evaluation of chest pain.  Ordered IV levothyroxine for symptomatic hypothyroidism

## 2025-05-06 LAB
ANION GAP SERPL CALC-SCNC: 15 MMOL/L (ref 7–16)
BUN SERPL-MCNC: 9 MG/DL (ref 8–22)
CALCIUM SERPL-MCNC: 9.1 MG/DL (ref 8.5–10.5)
CHLORIDE SERPL-SCNC: 101 MMOL/L (ref 96–112)
CO2 SERPL-SCNC: 22 MMOL/L (ref 20–33)
CREAT SERPL-MCNC: 1.37 MG/DL (ref 0.5–1.4)
EKG IMPRESSION: NORMAL
ERYTHROCYTE [DISTWIDTH] IN BLOOD BY AUTOMATED COUNT: 48.7 FL (ref 35.9–50)
GFR SERPLBLD CREATININE-BSD FMLA CKD-EPI: 66 ML/MIN/1.73 M 2
GLUCOSE BLD STRIP.AUTO-MCNC: 100 MG/DL (ref 65–99)
GLUCOSE BLD STRIP.AUTO-MCNC: 137 MG/DL (ref 65–99)
GLUCOSE BLD STRIP.AUTO-MCNC: 86 MG/DL (ref 65–99)
GLUCOSE BLD STRIP.AUTO-MCNC: 94 MG/DL (ref 65–99)
GLUCOSE SERPL-MCNC: 88 MG/DL (ref 65–99)
HCT VFR BLD AUTO: 36.5 % (ref 42–52)
HGB BLD-MCNC: 12.2 G/DL (ref 14–18)
MAGNESIUM SERPL-MCNC: 1.8 MG/DL (ref 1.5–2.5)
MCH RBC QN AUTO: 29.9 PG (ref 27–33)
MCHC RBC AUTO-ENTMCNC: 33.4 G/DL (ref 32.3–36.5)
MCV RBC AUTO: 89.5 FL (ref 81.4–97.8)
PHOSPHATE SERPL-MCNC: 4.4 MG/DL (ref 2.5–4.5)
PLATELET # BLD AUTO: 237 K/UL (ref 164–446)
PMV BLD AUTO: 10.9 FL (ref 9–12.9)
POTASSIUM SERPL-SCNC: 3.6 MMOL/L (ref 3.6–5.5)
RBC # BLD AUTO: 4.08 M/UL (ref 4.7–6.1)
SODIUM SERPL-SCNC: 138 MMOL/L (ref 135–145)
TROPONIN T SERPL-MCNC: 22 NG/L (ref 6–19)
WBC # BLD AUTO: 7.7 K/UL (ref 4.8–10.8)

## 2025-05-06 PROCEDURE — 84484 ASSAY OF TROPONIN QUANT: CPT

## 2025-05-06 PROCEDURE — 83735 ASSAY OF MAGNESIUM: CPT

## 2025-05-06 PROCEDURE — A9270 NON-COVERED ITEM OR SERVICE: HCPCS | Performed by: HOSPITALIST

## 2025-05-06 PROCEDURE — 97167 OT EVAL HIGH COMPLEX 60 MIN: CPT

## 2025-05-06 PROCEDURE — A9270 NON-COVERED ITEM OR SERVICE: HCPCS | Performed by: NURSE PRACTITIONER

## 2025-05-06 PROCEDURE — 80048 BASIC METABOLIC PNL TOTAL CA: CPT

## 2025-05-06 PROCEDURE — 85027 COMPLETE CBC AUTOMATED: CPT | Mod: 91

## 2025-05-06 PROCEDURE — 99233 SBSQ HOSP IP/OBS HIGH 50: CPT | Performed by: STUDENT IN AN ORGANIZED HEALTH CARE EDUCATION/TRAINING PROGRAM

## 2025-05-06 PROCEDURE — 700102 HCHG RX REV CODE 250 W/ 637 OVERRIDE(OP): Performed by: NURSE PRACTITIONER

## 2025-05-06 PROCEDURE — A9270 NON-COVERED ITEM OR SERVICE: HCPCS | Performed by: INTERNAL MEDICINE

## 2025-05-06 PROCEDURE — 80069 RENAL FUNCTION PANEL: CPT

## 2025-05-06 PROCEDURE — 97162 PT EVAL MOD COMPLEX 30 MIN: CPT

## 2025-05-06 PROCEDURE — 700111 HCHG RX REV CODE 636 W/ 250 OVERRIDE (IP): Mod: JZ | Performed by: INTERNAL MEDICINE

## 2025-05-06 PROCEDURE — 93005 ELECTROCARDIOGRAM TRACING: CPT | Mod: TC | Performed by: STUDENT IN AN ORGANIZED HEALTH CARE EDUCATION/TRAINING PROGRAM

## 2025-05-06 PROCEDURE — 84100 ASSAY OF PHOSPHORUS: CPT

## 2025-05-06 PROCEDURE — 36415 COLL VENOUS BLD VENIPUNCTURE: CPT

## 2025-05-06 PROCEDURE — 770020 HCHG ROOM/CARE - TELE (206)

## 2025-05-06 PROCEDURE — 93010 ELECTROCARDIOGRAM REPORT: CPT | Performed by: INTERNAL MEDICINE

## 2025-05-06 PROCEDURE — 700102 HCHG RX REV CODE 250 W/ 637 OVERRIDE(OP): Performed by: HOSPITALIST

## 2025-05-06 PROCEDURE — 700102 HCHG RX REV CODE 250 W/ 637 OVERRIDE(OP): Performed by: INTERNAL MEDICINE

## 2025-05-06 PROCEDURE — 82962 GLUCOSE BLOOD TEST: CPT | Mod: 91

## 2025-05-06 RX ORDER — DIPHENHYDRAMINE HCL 25 MG
25 TABLET ORAL ONCE
Status: COMPLETED | OUTPATIENT
Start: 2025-05-06 | End: 2025-05-06

## 2025-05-06 RX ORDER — ACETAMINOPHEN 325 MG/1
650 TABLET ORAL ONCE
Status: COMPLETED | OUTPATIENT
Start: 2025-05-06 | End: 2025-05-06

## 2025-05-06 RX ADMIN — FENOFIBRATE 134 MG: 134 CAPSULE ORAL at 04:46

## 2025-05-06 RX ADMIN — BUSPIRONE HYDROCHLORIDE 5 MG: 5 TABLET ORAL at 18:02

## 2025-05-06 RX ADMIN — DIVALPROEX SODIUM 500 MG: 500 TABLET, DELAYED RELEASE ORAL at 04:47

## 2025-05-06 RX ADMIN — BUSPIRONE HYDROCHLORIDE 5 MG: 5 TABLET ORAL at 04:46

## 2025-05-06 RX ADMIN — ACETAMINOPHEN 650 MG: 325 TABLET ORAL at 21:19

## 2025-05-06 RX ADMIN — DIPHENHYDRAMINE HYDROCHLORIDE 25 MG: 25 TABLET ORAL at 21:19

## 2025-05-06 RX ADMIN — ENOXAPARIN SODIUM 40 MG: 100 INJECTION SUBCUTANEOUS at 17:58

## 2025-05-06 RX ADMIN — HYDROCORTISONE: 1 CREAM TOPICAL at 08:05

## 2025-05-06 RX ADMIN — ASPIRIN 81 MG: 81 TABLET, CHEWABLE ORAL at 04:47

## 2025-05-06 RX ADMIN — DIVALPROEX SODIUM 1500 MG: 500 TABLET, DELAYED RELEASE ORAL at 17:58

## 2025-05-06 RX ADMIN — HYDROCORTISONE: 1 CREAM TOPICAL at 17:58

## 2025-05-06 RX ADMIN — INSULIN GLARGINE-YFGN 55 UNITS: 100 INJECTION, SOLUTION SUBCUTANEOUS at 17:59

## 2025-05-06 RX ADMIN — LEVOTHYROXINE SODIUM 200 MCG: 0.2 TABLET ORAL at 04:47

## 2025-05-06 RX ADMIN — LURASIDONE HYDROCHLORIDE 80 MG: 80 TABLET, FILM COATED ORAL at 17:58

## 2025-05-06 RX ADMIN — ATORVASTATIN CALCIUM 80 MG: 80 TABLET, FILM COATED ORAL at 21:19

## 2025-05-06 ASSESSMENT — COGNITIVE AND FUNCTIONAL STATUS - GENERAL
HELP NEEDED FOR BATHING: A LOT
PERSONAL GROOMING: A LITTLE
TOILETING: A LOT
DRESSING REGULAR LOWER BODY CLOTHING: A LITTLE
SUGGESTED CMS G CODE MODIFIER DAILY ACTIVITY: CK
TOILETING: A LOT
WALKING IN HOSPITAL ROOM: A LOT
CLIMB 3 TO 5 STEPS WITH RAILING: A LOT
SUGGESTED CMS G CODE MODIFIER MOBILITY: CJ
MOBILITY SCORE: 22
DRESSING REGULAR UPPER BODY CLOTHING: A LITTLE
WALKING IN HOSPITAL ROOM: A LITTLE
DAILY ACTIVITIY SCORE: 15
DRESSING REGULAR UPPER BODY CLOTHING: A LITTLE
SUGGESTED CMS G CODE MODIFIER MOBILITY: CK
CLIMB 3 TO 5 STEPS WITH RAILING: A LITTLE
EATING MEALS: A LITTLE
MOVING TO AND FROM BED TO CHAIR: A LOT
HELP NEEDED FOR BATHING: A LOT
DAILY ACTIVITIY SCORE: 18
DRESSING REGULAR LOWER BODY CLOTHING: A LOT
SUGGESTED CMS G CODE MODIFIER DAILY ACTIVITY: CK
MOBILITY SCORE: 16
STANDING UP FROM CHAIR USING ARMS: A LOT

## 2025-05-06 ASSESSMENT — ENCOUNTER SYMPTOMS
NAUSEA: 0
SPUTUM PRODUCTION: 0
COUGH: 0
DIZZINESS: 0

## 2025-05-06 ASSESSMENT — GAIT ASSESSMENTS
DISTANCE (FEET): 5
GAIT LEVEL OF ASSIST: CONTACT GUARD ASSIST
ASSISTIVE DEVICE: FRONT WHEEL WALKER

## 2025-05-06 ASSESSMENT — ACTIVITIES OF DAILY LIVING (ADL): TOILETING: INDEPENDENT

## 2025-05-06 ASSESSMENT — PAIN DESCRIPTION - PAIN TYPE
TYPE: ACUTE PAIN
TYPE: ACUTE PAIN

## 2025-05-06 ASSESSMENT — FIBROSIS 4 INDEX: FIB4 SCORE: 1.13

## 2025-05-06 NOTE — DISCHARGE PLANNING
Care Transition Team Assessment  Patient is a 44 year-old male admitted for headache. Please see pt's H&P for prior medical history. RNCM met with pt at bedside to complete assessment. Pt A&Ox4 and able to verify the information on the face sheet. Pt lives alone in a single-story, ground floor apt. Emergency contact is  through [facility] [phone]. Pt reports  assists with helping patient get to appointments, making sure he takes his medications, taking patient shopping, etc. Prior to admission patient uses a cane, does not drive but takes the bus or gets rides from . His mother lives in [place]. Pt receives monthly SSD deposits of $928. The patient's PCP is Obdulia PRETTY. Patient's preferred pharmacy is [facility]. Pt reports occasional marijuana use (last use 2 months ago), and rare alcohol use. Pt reports hx of PTSD, depression, anxiety, Bipolar, and borderline personality disorder. Patients confirmed medical coverage with Medicaid FFS. Patient has means to transportation and  may be able to provide transport once medically stable for discharge. RNCM discussed discharge planning. Patient reported pt's goal is to return home once medically stable.      Information Source  Orientation Level: Oriented X4  Information Given By: Patient  Who is responsible for making decisions for patient? : Patient    Readmission Evaluation  Is this a readmission?: No    Elopement Risk  Legal Hold: No  Ambulatory or Self Mobile in Wheelchair: Yes  Disoriented: No  Psychiatric Symptoms: None  History of Wandering: No  Elopement this Admit: No  Vocalizing Wanting to Leave: No  Displays Behaviors, Body Language Wanting to Leave: No-Not at Risk for Elopement  Elopement Risk: Not at Risk for Elopement    Interdisciplinary Discharge Planning  Lives with - Patient's Self Care Capacity: Alone and Able to Care For Self  Patient or legal guardian wants to designate a  caregiver: No  Support Systems: Friends / Neighbors  Housing / Facility: 1 Story Apartment / Condo    Discharge Preparedness  What is your plan after discharge?: Home with help  What are your discharge supports?: Parent, Other (comment) ()  Prior Functional Level: Ambulatory, Needs Assist with Activities of Daily Living, Needs Assist with Medication Management, Uses Cane  Difficulity with ADLs: Walking  Difficulty with ADLs Comment: cane  Difficulity with IADLs: Driving, Shopping, Managing medication  Difficulity with IADL Comments:     Functional Assesment  Prior Functional Level: Ambulatory, Needs Assist with Activities of Daily Living, Needs Assist with Medication Management, Uses Cane    Finances  Financial Barriers to Discharge: Yes  Average Monthly Income: 928 $  Source of Income: Social Security Disability    Vision / Hearing Impairment  Vision Impairment : Yes  Right Eye Vision: Impaired, Wears Glasses  Left Eye Vision: Blind  Hearing Impairment : No    Advance Directive  Advance Directive?: None  Advance Directive offered?: AD Booklet refused    Domestic Abuse  Have you ever been the victim of abuse or violence?: No  Possible Abuse/Neglect Reported to:: Not Applicable    Psychological Assessment  History of Substance Abuse: None  History of Psychiatric Problems: Yes  Non-compliant with Treatment: No  Newly Diagnosed Illness: No    Discharge Risks or Barriers  Discharge risks or barriers?: Complex medical needs, Mental health  Patient risk factors: Mental health, Cognitive / sensory / physical deficit    Anticipated Discharge Information  Discharge Disposition: Discharged to home/self care (01)  Discharge Address: [address] [place] [id number]  Discharge Contact Phone Number: [phone]  Case Management Discharge Planning    Admission Date: 5/2/2025  GMLOS: 2.6  ALOS: 3    6-Clicks ADL Score: 18  6-Clicks Mobility Score: 16  PT and/or OT Eval ordered: Yes  Post-acute  Referrals Ordered: No  Post-acute Choice Obtained: No  Has referral(s) been sent to post-acute provider:  No    Anticipated Discharge Dispo: Discharge Disposition: Discharged to home/self care (01)  Discharge Address: [address] [place] [id number]  Discharge Contact Phone Number: [phone]    DME Needed: No    Action(s) Taken: DC Assessment Complete (See below)    Escalations Completed: None    Medically Clear: No    Next Steps: RNCM to continue to follow patient for DCP needs.     Barriers to Discharge: Medical clearance and Pending PT Evaluation

## 2025-05-06 NOTE — CARE PLAN
The patient is Stable - Low risk of patient condition declining or worsening    Shift Goals  Clinical Goals: VS, monitor labs, PT/OT  Patient Goals: rest  Family Goals: kristen    Progress made toward(s) clinical / shift goals:    Problem: Pain - Standard  Goal: Alleviation of pain or a reduction in pain to the patient’s comfort goal  Description: Target End Date:  Prior to discharge or change in level of careDocument on Vitals flowsheet1.  Document pain using the appropriate pain scale per order or unit policy2.  Educate and implement non-pharmacologic comfort measures (i.e. relaxation, distraction, massage, cold/heat therapy, etc.)3.  Pain management medications as ordered4.  Reassess pain after pain med administration per policy5.  If opiods administered assess patient's response to pain medication is appropriate per POSS sedation scale6.  Follow pain management plan developed in collaboration with patient and interdisciplinary team (including palliative care or pain specialists if applicable)  Outcome: Progressing     Problem: Knowledge Deficit - Standard  Goal: Patient and family/care givers will demonstrate understanding of plan of care, disease process/condition, diagnostic tests and medications  Description: Target End Date:  1-3 days or as soon as patient condition allowsDocument in Patient Education1.  Patient and family/caregiver oriented to unit, equipment, visitation policy and means for communicating concern2.  Complete/review Learning Assessment3.  Assess knowledge level of disease process/condition, treatment plan, diagnostic tests and medications4.  Explain disease process/condition, treatment plan, diagnostic tests and medications  Outcome: Progressing     Problem: Fall Risk  Goal: Patient will remain free from falls  Description: Target End Date:  Prior to discharge or change in level of careDocument interventions on the Mariluz Ball Fall Risk Assessment1.  Assess for fall risk factors2.  Implement  fall precautions  Outcome: Progressing

## 2025-05-06 NOTE — CONSULTS
Diabetes education: Pt has a hx of diabetes on insulin, and known from previous visits. Pt was admitted with blood sugar of 293, and Hga1c of 18.3%.  Pt is currently on Glargine 55 units pm with Lipsro 3 units tid meals and Lispro per sliding scale coverage ac and hs. Blood sugars are 114 ( 3 units), 195 ( 2 + 3 units), and 140 ( 3 units).  Met with pt this afternoon. Pt admits to forgetting doses of both insulin. Reviewed the Importance of taking the insulin or making the provider aware of missing doses.  Discussed options/techniques to remind him about his insulin ( setting alarms on phone, white board etc). Discussed insulin storage, shelf life and site rotation.  Plan: Pt states he has all his medications and supplies at home. CDE to continue to follow.

## 2025-05-06 NOTE — THERAPY
Occupational Therapy   Initial Evaluation     Patient Name: Norm Prabhakar  Age:  44 y.o., Sex:  male  Medical Record #: [id number]  Today's Date: 5/6/2025     Precautions  Precautions: Fall Risk  Comments: inconsistent function/participation.    Assessment  Patient is 44 y.o. male admitted 5/2 with headache, nausea, fatigue, loss of appetite found to have severe symptomatic hypothyroidism s/p thyroidectomy, dehydration, hyponatremia. He has a hx of functional neurological disorder, DM2, HTN, hypothyroidism, PTSD, anxiety, depression, bipolar disorder per chart.    Pt is currently limited by weakness, fatigue, impaired balance, impaired safety awareness, and impaired BUE function, which impacts ability to complete ADLs, ADL txfs, and functional mobility.      Plan    Occupational Therapy Initial Treatment Plan   Treatment Interventions: Self Care / Activities of Daily Living, Adaptive Equipment, Neuro Re-Education / Balance, Therapeutic Exercises, Therapeutic Activity, Family / Caregiver Training  Treatment Frequency: 3 Times per Week  Duration: Until Therapy Goals Met    DC Equipment Recommendations: Unable to determine at this time  Discharge Recommendations: Recommend post-acute placement for additional occupational therapy services prior to discharge home      Objective       05/06/25 0932   Prior Living Situation   Prior Services Other (Comments)   Housing / Facility 1 Story Apartment / Condo   Steps Into Home 0   Steps In Home 0   Bathroom Set up Bathtub / Shower Combination;Grab Bars   Equipment Owned Single Point Cane;Grab Bar(s) In Tub / Shower   Lives with - Patient's Self Care Capacity Other (Comments)   Comments Pt has a  that helps him with meds once a week, drives him to the store and appointments. Otherwise he will take the bus.   Prior Level of ADL Function   Self Feeding Independent   Grooming / Hygiene Independent   Bathing Independent   Dressing Independent   Toileting Independent    Prior Level of IADL Function   Medication Management Requires Assist   Laundry Independent   Kitchen Mobility Independent   Finances Independent   Home Management Independent   Shopping Requires Assist   Prior Level Of Mobility Independent Without Device in Community;Independent Without Device in Home   Driving / Transportation Other (Comments)   Occupation (Pre-Hospital Vocational) Not Employed   Leisure Interests Pets   Precautions   Precautions Fall Risk   Vitals   Vitals Comments BP supine 91/62, sitting 89/57, standing after marching 93/60.   Pain 0 - 10 Group   Therapist Pain Assessment Nurse Notified;During Activity;0   Cognition    Cognition / Consciousness WDL   Comments pleasant and cooperative, appears to be at baseline. mild delay. [place].   Active ROM Upper Body   Comments WFL for light ADLs   Strength Upper Body   Upper Body Strength  X   Gross Strength Generalized Weakness, Equal Bilaterally.    Balance Assessment   Sitting Balance (Static) Fair   Sitting Balance (Dynamic) Fair   Standing Balance (Static) Fair -   Standing Balance (Dynamic) Poor +   Weight Shift Sitting Fair   Weight Shift Standing Fair   Comments w/ fww   Bed Mobility    Supine to Sit Standby Assist   Sit to Supine Standby Assist   Scooting Standby Assist   ADL Assessment   Grooming Supervision;Seated   Lower Body Dressing Maximal Assist   Toileting Maximal Assist  (sheets soiled with urine)   How much help from another person does the patient currently need...   Putting on and taking off regular lower body clothing? 2   Bathing (including washing, rinsing, and drying)? 2   Toileting, which includes using a toilet, bedpan, or urinal? 2   Putting on and taking off regular upper body clothing? 3   Taking care of personal grooming such as brushing teeth? 3   Eating meals? 3   6 Clicks Daily Activity Score 15   Functional Mobility   Sit to Stand Contact Guard Assist   Bed, Chair, Wheelchair Transfer Contact Guard Assist   Mobility  EOB>STS>long-term to sink>BTB due to lightheadedness   Comments w/ fww   Short Term Goals   Short Term Goal # 1 pt will demo ADL txfs w/ supv   Short Term Goal # 2 pt will dress LB w/ supv and AE prn   Short Term Goal # 3 pt will demo toileting including clothing mgmt w/ supv     Pt seen for OT eval in coordination with PT due to the need for two skilled therapists due to limited mobility; RN report that patient required increased support.

## 2025-05-06 NOTE — PROGRESS NOTES
Hospital Medicine Daily Progress Note    Date of Service  5/6/2025    Chief Complaint  Norm Prabhakar is a 44 y.o. male admitted 5/2/2025 with generalized weakness    Hospital Course  This is a 44 y.o. male admitted 5/2/2025 with PTSD, type 2 diabetes mellitus not controlled, major depressive disorder, insomnia , hypothyroidism status post thyroidectomy, seizure disorder who comes into the hospital with generalized weakness.  On arrival to ED patient noted to have hyponatremia, hyperglycemia, significantly elevated A1c 18.9, significantly elevated TSH 60, free T4 less than 0.11.  Initially was admitted under medical for and was transferred telemetry for evaluation of chest pain.  Subsequent EKG with bradycardia with nonspecific ST changes, negative troponin.       Interval Problem Update  -Notes were extensively reviewed from primary team, radiology, ED, surgery, specialists, etc.   -Reviewed labs to date, microbiology for current admit and prior  -Imaging was independently reviewed and interpreted    Patient reports chest pain and generalized weakness.  PT OT today, recommend SNF.  Discussed with .  SNF referral  Reports ongoing chest pain.  Check troponin.  Echo revealed normal EF 55%, normal right ventricular size and function  Continue p.o. levothyroxine  I have reviewed the EKG, Qtc 461, unhold zoloft and naltrexone     I have discussed this patient's plan of care and discharge plan at IDT rounds today with Case Management, Nursing, Nursing leadership, and other members of the IDT team.    Code Status  Full Code    Disposition  The patient is not medically cleared for discharge to home or a post-acute facility.      I have placed the appropriate orders for post-discharge needs.    Review of Systems  Review of Systems   Constitutional:  Positive for malaise/fatigue.   Respiratory:  Negative for cough and sputum production.    Cardiovascular:  Negative for chest pain.   Gastrointestinal:  Negative  for nausea.   Neurological:  Negative for dizziness.        Physical Exam  Temp:  [36 °C (96.8 °F)-36.5 °C (97.7 °F)] 36 °C (96.8 °F)  Pulse:  [60-74] 71  Resp:  [16-18] 17  BP: ()/(57-80) 96/64  SpO2:  [90 %-95 %] 90 %    Physical Exam  Constitutional:       Appearance: He is obese. He is ill-appearing.   HENT:      Mouth/Throat:      Mouth: Mucous membranes are dry.   Eyes:      Comments: Glaucoma noted   Cardiovascular:      Rate and Rhythm: Normal rate.   Abdominal:      General: Abdomen is flat. There is no distension.   Neurological:      Mental Status: He is alert.      Comments: Overall generalized illness, moving all extremities         Fluids    Intake/Output Summary (Last 24 hours) at 5/6/2025 1324  Last data filed at 5/6/2025 1148  Gross per 24 hour   Intake 400 ml   Output 3200 ml   Net -2800 ml        Laboratory  Recent Labs     05/05/25  0754 05/06/25  0033   WBC 7.1 7.7   RBC 4.34* 4.08*   HEMOGLOBIN 13.3* 12.2*   HEMATOCRIT 38.6* 36.5*   MCV 88.9 89.5   MCH 30.6 29.9   MCHC 34.5 33.4   RDW 48.3 48.7   PLATELETCT 247 237   MPV 10.7 10.9     Recent Labs     05/04/25  0110 05/05/25  0754 05/06/25  0033   SODIUM 132* 136 138   POTASSIUM 4.1 3.7 3.6   CHLORIDE 100 100 101   CO2 21 26 22   GLUCOSE 244* 115* 88   BUN 15 11 9   CREATININE 1.03 1.12 1.37   CALCIUM 8.8 8.8 9.1                   Imaging  EC-ECHOCARDIOGRAM COMPLETE W/O CONT   Final Result           Assessment/Plan  * Hyponatremia- (present on admission)  Assessment & Plan  Likely  secondary to severe dehydration and severe hypothyroidism, sodium improving at 132, monitor  Monitor labs in a.m.        Muscle weakness (generalized)  Assessment & Plan  Insetting of severe hypothyroidism  Patient receiving IV levothyroxine followed by oral levothyroxine    Chest pain  Assessment & Plan  The ASCVD Risk score (Kelley MCLEAN, et al., 2019) failed to calculate for the following reasons:    Risk score cannot be calculated because patient has a medical  history suggesting prior/existing ASCVD      Unlikely ACS  Had 1 episode of chest pain, no chest pain-free, remained asymptomatic  Troponin negative, EKG unremarkable  Telemetry monitoring  Echocardiogram for evaluation of chest pain    5/6: reports ongoing chest pressure, will check trop and EKG  Echo reviewed EF 55%, normal RV size and function  Tele       Hypothyroidism- (present on admission)  Assessment & Plan  TSH elevated at 60, T4 is undetectable  Patient is adamant that he takes it daily and on ES  Increase synthroid to 200 mcg daily  Recheck TFT's in 4- 6 wks    Symptomatic hypothyroidism  Currently no concern for myxedema,  Continue complain of worsening generalized weakness  I have started patient on 1 dose of 200 mcg IV thyroxine for ongoing symptomatic hypothyroidism.  I advised him to follow-up with endocrinology as outpatient for better management of hypothyroidism and diabetes.  Patient verbalized understanding.  PT/OT  Continue levothyroxine 200 mcg daily    Seizure disorder (HCC)- (present on admission)  Assessment & Plan  Continue on Depakote    Primary hypertension- (present on admission)  Assessment & Plan  No current medications  Remains on the low side, monitor closely    Class 1 obesity due to excess calories with serious comorbidity and body mass index (BMI) of 32.0 to 32.9 in adult- (present on admission)  Assessment & Plan  Outpatient nutritional counseling    Type 2 diabetes mellitus without complication, without long-term current use of insulin (HCC)- (present on admission)  Assessment & Plan  Poor control, BSs are somewhat better today, I personally reviewed these labs on 5/3  A1c is 19, education given  Hold outpatient scheduled lispro  Continue lantus 55 units QHS, ISS, hypoglycemia protocol along with 4 units 3 times daily with meals   ---- adjusting insulin  Advised to follow-up with outpatient endocrinology    PTSD (post-traumatic stress disorder)- (present on  admission)  Assessment & Plan  Psych meds as noted above    Anemia- (present on admission)  Assessment & Plan  Monitor hemoglobin hematocrit, hemoglobin at 12.4    Anxiety and depression- (present on admission)  Assessment & Plan  Not well controlled, but no SI nor HI  Continue buspar,and lurasidone  Continue zoloft     Postoperative hypothyroidism- (present on admission)  Assessment & Plan  S/p thyroidectomy  He is following with endocrine as outpatient         VTE prophylaxis: lovenox    I have performed a physical exam and reviewed and updated ROS and Plan today (5/6/2025). In review of yesterday's note (5/5/2025), there are no changes except as documented above.         Greater than 51 minutes spent preparing to see patient (e.g. review of tests) obtaining and/or reviewing separately obtained history. Performing a medically appropriate examination and/ evaluation.  Counseling and educating the patient/family/caregiver.  Ordering medications, tests, or procedures.  Referring and communicating with other health care professionals.  Documenting clinical information in EPIC.  Independently interpreting results and communicating results to patient/family/caregiver.  Care coordination.  Echocardiogram ordered for further evaluation of chest pain.  Ordered IV levothyroxine for symptomatic hypothyroidism

## 2025-05-06 NOTE — DIETARY
- Procalcitonin elevated on presentation 0 81  - Concern for aspiration pneumonia secondary to seizure per primary team  - Medical management per primary team Nutrition Services: Type 2 Diabetes diet Education Consult   Day 3 of admit.  Norm Prabhakar is a 44 y.o. male with admitting DX of Hyponatremia [E87.1]     RD visited the patient at the bedside to provide education on the Type 2 Diabetes diet. RD explained the MyPlate method and provided a handout to reinforce the topics discussed. The patient demonstrated good readiness to learn and showed appropriate evidence of understanding. All of the patient’s questions were answered to their satisfaction.    No other education needs identified at this time. Consider referral to outpatient nutrition services for continuation of education as indicated or per pt preferences.     Please re-consult RD as indicated.

## 2025-05-06 NOTE — PROGRESS NOTES
Bedside report received from off going RN/tech: [place], assumed care of patient.     Fall Risk Score: HIGH RISK  Fall risk interventions in place: Place yellow fall risk ID band on patient, Provide patient/family education based on risk assessment, Educate patient/family to call staff for assistance when getting out of bed, Place fall precaution signage outside patient door, Place patient in room close to nursing station, Utilize bed/chair fall alarm, Notify charge of high risk for huddle, and Bed alarm connected correctly  Bed type: Regular (Demar Score less than 17 interventions in place)  Patient on cardiac monitor: Yes  IVF/IV medications: Not Applicable   Oxygen: Room Air  Bedside sitter: Not Applicable   Isolation: Not applicable

## 2025-05-06 NOTE — DISCHARGE PLANNING
Care Transition Team Discharge Planning    Anticipated Discharge Information  Discharge Disposition: Discharged to home/self care (01)  Discharge Address: [address] [facility] [address]  Discharge Contact Phone Number: [phone]    Discharge Plan:  PT recommending post acute placement. Requested DPA to send local SNF referrals.     [facility]# [id number]  LOC# [id number]

## 2025-05-06 NOTE — PROGRESS NOTES
Diabetes education: Met with pt this afternoon. Please see consult note.  Plan: Pt states he has all his medications and supplies at home. CDE to continue to follow.

## 2025-05-06 NOTE — THERAPY
"Physical Therapy   Initial Evaluation     Patient Name: Norm Prabhakar  Age:  44 y.o., Sex:  male  Medical Record #: [id number]  Today's Date: 5/6/2025     Precautions  Precautions: Fall Risk  Comments: inconsistent function/participation.    Assessment  Patient is 44 y.o. male with a diagnosis of hyponatremia.  Additional factors influencing patient status / progress : today, pt shows inconsistent activity tolerance, as nsg reports that earlier, pt told nsg that he had a BM in the bed because he was \"too weak to get out of bed\". Today during PT, c/o dizzy feeling with sitting EOB and same dizzy in standing and marching in place. Pt agreeable to ambulate in room with encouragement, but does c/o increased dizziness with taking a few steps away from bed. BP in sitting 89/57 and 93/60 with standing after march in place. Pt was able to take backwards steps and follow cues to sit and scoot towards HOB. Pt needed no assist to return to supine in bed. See details below, PT to follow. PATIENT WILL NEED TO BE UP TO WALK TO BATHROOM AND WALK IN HALLWAY WITH NSG, BEST TO USE A FWW FOR SAFETY. .      Plan    Physical Therapy Initial Treatment Plan   Treatment Plan : Bed Mobility, Gait Training, Neuro Re-Education / Balance, Therapeutic Activities  Treatment Frequency: 3 Times per Week  Duration: Until Therapy Goals Met    DC Equipment Recommendations: Unable to determine at this time  Discharge Recommendations: Recommend post-acute placement for additional physical therapy services prior to discharge home (but likely able to dc home as BP improves along with tolerance for ambulation.)            Objective       05/06/25 0935   Initial Contact Note    Initial Contact Note Order Received and Verified, Physical Therapy Evaluation in Progress with Full Report to Follow.   Precautions   Precautions Fall Risk   Comments inconsistent function/participation.   Vitals   Pulse 74   Patient BP Position Shell's Position   Blood Pressure " "(!) 89/57   Pulse Oximetry 95 %   O2 Delivery Device None - Room Air   Vitals Comments BP in standin/60   Pain 0 - 10 Group   Therapist Pain Assessment During Activity;Nurse Notified;0   Prior Living Situation   Prior Services Other (Comments)  (community  helps with rides and medication mgmt)   Housing / Facility 1 Story Apartment / Condo   Steps Into Home 0   Steps In Home 0   Equipment Owned Single Point Cane   Lives with - Patient's Self Care Capacity Alone and Able to Care For Self   Prior Level of Functional Mobility   Bed Mobility Independent   Transfer Status Independent   Ambulation Independent   Ambulation Distance community, takes bus for most transportation   Assistive Devices Used None   Cognition    Comments alert, following cues, agreeable.   Active ROM Lower Body    Active ROM Lower Body  WDL   Comments functionally observed   Strength Lower Body   Lower Body Strength  WDL   Comments functionally observed   Lower Body Muscle Tone   Lower Body Muscle Tone  WDL   Balance Assessment   Sitting Balance (Static) Fair   Sitting Balance (Dynamic) Fair   Standing Balance (Static) Fair   Standing Balance (Dynamic) Fair -   Weight Shift Sitting Fair   Weight Shift Standing Fair   Comments with FWW   Bed Mobility    Supine to Sit Supervised   Sit to Supine Supervised   Scooting Supervised   Rolling Supervised   Gait Analysis   Gait Level Of Assist Contact Guard Assist   Assistive Device Front Wheel Walker   Distance (Feet) 5   # of Times Distance was Traveled 1   Comments less responsive after walking a few steps, when asked if ok, pt reports \"dizzy\". Pt was then able to follow cues to take backwards steps while pulling walking in towards self and to turn to sit at EOB.   Functional Mobility   Sit to Stand Supervised   Bed, Chair, Wheelchair Transfer   (declines offer for up to chair, asks for BTB)   Transfer Method Stand Step   Comments pt reports dizzy while sitting EOB, reports same dizzy in " standing and marching in place. Pt reports increased dizzy with walking towards sink so was assisted BTB   6 Clicks Assessment - How much HELP from from another person do you currently need... (If the patient hasn't done an activity recently, how much help from another person do you think he/she would need if he/she tried?)   Turning from your back to your side while in a flat bed without using bedrails? 4   Moving from lying on your back to sitting on the side of a flat bed without using bedrails? 4   Moving to and from a bed to a chair (including a wheelchair)? 4   Standing up from a chair using your arms (e.g., wheelchair, or bedside chair)? 4   Walking in hospital room? 3   Climbing 3-5 steps with a railing? 3   6 clicks Mobility Score 22   Activity Tolerance   Sitting Edge of Bed 10   Standing 6 min   Short Term Goals    Short Term Goal # 1 Pt will ambulate x 125 feet using FWW vs no AD with supervision in 6 visits to improve functional indep.   Education Group   Education Provided Role of Physical Therapist   Role of Physical Therapist Patient Response Patient;Acceptance;Explanation;Verbal Demonstration   Physical Therapy Initial Treatment Plan    Treatment Plan  Bed Mobility;Gait Training;Neuro Re-Education / Balance;Therapeutic Activities   Treatment Frequency 3 Times per Week   Duration Until Therapy Goals Met   Problem List    Problems Decreased Activity Tolerance;Impaired Balance   Anticipated Discharge Equipment and Recommendations   DC Equipment Recommendations Unable to determine at this time   Discharge Recommendations Recommend post-acute placement for additional physical therapy services prior to discharge home  (but likely able to dc home as BP improves along with tolerance for ambulation.)   Interdisciplinary Plan of Care Collaboration   IDT Collaboration with  Nursing   Patient Position at End of Therapy In Bed;Call Light within Reach;Tray Table within Reach;Phone within Reach;Bed Alarm On    Collaboration Comments abdias updated   Session Information   Date / Session Number  5/6-1 (1/3, 5/12)

## 2025-05-07 LAB
ALBUMIN SERPL BCP-MCNC: 3.7 G/DL (ref 3.2–4.9)
ANION GAP SERPL CALC-SCNC: 11 MMOL/L (ref 7–16)
BUN SERPL-MCNC: 11 MG/DL (ref 8–22)
CALCIUM ALBUM COR SERPL-MCNC: 9.7 MG/DL (ref 8.5–10.5)
CALCIUM SERPL-MCNC: 9.5 MG/DL (ref 8.5–10.5)
CHLORIDE SERPL-SCNC: 98 MMOL/L (ref 96–112)
CO2 SERPL-SCNC: 28 MMOL/L (ref 20–33)
CREAT SERPL-MCNC: 1.05 MG/DL (ref 0.5–1.4)
ERYTHROCYTE [DISTWIDTH] IN BLOOD BY AUTOMATED COUNT: 49.3 FL (ref 35.9–50)
GFR SERPLBLD CREATININE-BSD FMLA CKD-EPI: 90 ML/MIN/1.73 M 2
GLUCOSE BLD STRIP.AUTO-MCNC: 140 MG/DL (ref 65–99)
GLUCOSE BLD STRIP.AUTO-MCNC: 179 MG/DL (ref 65–99)
GLUCOSE BLD STRIP.AUTO-MCNC: 88 MG/DL (ref 65–99)
GLUCOSE BLD STRIP.AUTO-MCNC: 99 MG/DL (ref 65–99)
GLUCOSE SERPL-MCNC: 80 MG/DL (ref 65–99)
HCT VFR BLD AUTO: 35.1 % (ref 42–52)
HGB BLD-MCNC: 11.6 G/DL (ref 14–18)
MCH RBC QN AUTO: 30 PG (ref 27–33)
MCHC RBC AUTO-ENTMCNC: 33 G/DL (ref 32.3–36.5)
MCV RBC AUTO: 90.7 FL (ref 81.4–97.8)
PHOSPHATE SERPL-MCNC: 4.5 MG/DL (ref 2.5–4.5)
PLATELET # BLD AUTO: 225 K/UL (ref 164–446)
PMV BLD AUTO: 10.8 FL (ref 9–12.9)
POTASSIUM SERPL-SCNC: 3.5 MMOL/L (ref 3.6–5.5)
RBC # BLD AUTO: 3.87 M/UL (ref 4.7–6.1)
SODIUM SERPL-SCNC: 137 MMOL/L (ref 135–145)
WBC # BLD AUTO: 8.5 K/UL (ref 4.8–10.8)

## 2025-05-07 PROCEDURE — 700102 HCHG RX REV CODE 250 W/ 637 OVERRIDE(OP): Performed by: INTERNAL MEDICINE

## 2025-05-07 PROCEDURE — A9270 NON-COVERED ITEM OR SERVICE: HCPCS | Performed by: STUDENT IN AN ORGANIZED HEALTH CARE EDUCATION/TRAINING PROGRAM

## 2025-05-07 PROCEDURE — 770001 HCHG ROOM/CARE - MED/SURG/GYN PRIV*

## 2025-05-07 PROCEDURE — 700102 HCHG RX REV CODE 250 W/ 637 OVERRIDE(OP): Performed by: HOSPITALIST

## 2025-05-07 PROCEDURE — A9270 NON-COVERED ITEM OR SERVICE: HCPCS | Performed by: HOSPITALIST

## 2025-05-07 PROCEDURE — 99233 SBSQ HOSP IP/OBS HIGH 50: CPT | Performed by: STUDENT IN AN ORGANIZED HEALTH CARE EDUCATION/TRAINING PROGRAM

## 2025-05-07 PROCEDURE — 700111 HCHG RX REV CODE 636 W/ 250 OVERRIDE (IP): Mod: JZ | Performed by: INTERNAL MEDICINE

## 2025-05-07 PROCEDURE — A9270 NON-COVERED ITEM OR SERVICE: HCPCS | Performed by: INTERNAL MEDICINE

## 2025-05-07 PROCEDURE — 82962 GLUCOSE BLOOD TEST: CPT | Mod: 91

## 2025-05-07 PROCEDURE — 700102 HCHG RX REV CODE 250 W/ 637 OVERRIDE(OP): Performed by: STUDENT IN AN ORGANIZED HEALTH CARE EDUCATION/TRAINING PROGRAM

## 2025-05-07 RX ORDER — LEVOTHYROXINE SODIUM 100 UG/1
200 TABLET ORAL
Status: SHIPPED
Start: 2025-05-07 | End: 2025-05-12

## 2025-05-07 RX ADMIN — INSULIN LISPRO 2 UNITS: 100 INJECTION, SOLUTION INTRAVENOUS; SUBCUTANEOUS at 21:34

## 2025-05-07 RX ADMIN — DIVALPROEX SODIUM 500 MG: 500 TABLET, DELAYED RELEASE ORAL at 05:07

## 2025-05-07 RX ADMIN — BUSPIRONE HYDROCHLORIDE 5 MG: 5 TABLET ORAL at 16:23

## 2025-05-07 RX ADMIN — BUSPIRONE HYDROCHLORIDE 5 MG: 5 TABLET ORAL at 05:07

## 2025-05-07 RX ADMIN — ENOXAPARIN SODIUM 40 MG: 100 INJECTION SUBCUTANEOUS at 16:22

## 2025-05-07 RX ADMIN — HYDROCORTISONE 1 EACH: 1 CREAM TOPICAL at 08:06

## 2025-05-07 RX ADMIN — LURASIDONE HYDROCHLORIDE 80 MG: 80 TABLET, FILM COATED ORAL at 16:24

## 2025-05-07 RX ADMIN — NALTREXONE HYDROCHLORIDE 50 MG: 50 TABLET, FILM COATED ORAL at 05:08

## 2025-05-07 RX ADMIN — INSULIN GLARGINE-YFGN 55 UNITS: 100 INJECTION, SOLUTION SUBCUTANEOUS at 16:28

## 2025-05-07 RX ADMIN — ASPIRIN 81 MG: 81 TABLET, CHEWABLE ORAL at 05:08

## 2025-05-07 RX ADMIN — SERTRALINE 200 MG: 100 TABLET, FILM COATED ORAL at 05:08

## 2025-05-07 RX ADMIN — LEVOTHYROXINE SODIUM 200 MCG: 0.2 TABLET ORAL at 05:07

## 2025-05-07 RX ADMIN — ATORVASTATIN CALCIUM 80 MG: 80 TABLET, FILM COATED ORAL at 21:27

## 2025-05-07 RX ADMIN — DIVALPROEX SODIUM 1500 MG: 500 TABLET, DELAYED RELEASE ORAL at 16:23

## 2025-05-07 RX ADMIN — FENOFIBRATE 134 MG: 134 CAPSULE ORAL at 05:07

## 2025-05-07 RX ADMIN — HYDROCORTISONE 1 EACH: 1 CREAM TOPICAL at 16:24

## 2025-05-07 ASSESSMENT — ENCOUNTER SYMPTOMS
NAUSEA: 0
DIZZINESS: 0
SPUTUM PRODUCTION: 0
COUGH: 0

## 2025-05-07 ASSESSMENT — PAIN DESCRIPTION - PAIN TYPE
TYPE: ACUTE PAIN
TYPE: ACUTE PAIN

## 2025-05-07 ASSESSMENT — FIBROSIS 4 INDEX: FIB4 SCORE: 1.19

## 2025-05-07 NOTE — PROGRESS NOTES
[facility] Medicine Daily Progress Note    Date of Service  5/7/2025    Chief Complaint  Norm Prabhakar is a 44 y.o. male admitted 5/2/2025 with generalized weakness    Hospital Course  This is a 44 y.o. male admitted 5/2/2025 with PTSD, type 2 diabetes mellitus not controlled, major depressive disorder, insomnia , hypothyroidism status post thyroidectomy, seizure disorder who comes into the hospital with generalized weakness.  On arrival to ED patient noted to have hyponatremia, hyperglycemia, significantly elevated A1c 18.9, significantly elevated TSH 60, free T4 less than 0.11.  Initially was admitted under medical for and was transferred telemetry for evaluation of chest pain.  Subsequent EKG with bradycardia with nonspecific ST changes, negative troponin.       Interval Problem Update  No acute overnight events  Patient denies chest pain, but reports generalized weakness, lives alone. PT/OT rec SNF  EKG reviewed NSR, nonspecific ST-T change  Continue aspirin, atorvastatin, Depakote, levothyroxine and Zoloft    I have discussed this patient's plan of care and discharge plan at IDT rounds today with Case Management, Nursing, Nursing leadership, and other members of the IDT team.    Code Status  Full Code    Disposition  The patient is not medically cleared for discharge to home or a post-acute facility.      I have placed the appropriate orders for post-discharge needs.    Review of Systems  Review of Systems   Constitutional:  Positive for malaise/fatigue.   Respiratory:  Negative for cough and sputum production.    Cardiovascular:  Negative for chest pain.   Gastrointestinal:  Negative for nausea.   Neurological:  Negative for dizziness.        Physical Exam  Temp:  [36 °C (96.8 °F)-36.5 °C (97.7 °F)] 36 °C (96.8 °F)  Pulse:  [60-70] 61  Resp:  [17-18] 17  BP: ()/(64-76) 106/68  SpO2:  [88 %-96 %] 92 %    Physical Exam  Constitutional:       Appearance: He is obese. He is ill-appearing.   HENT:       Mouth/Throat:      Mouth: Mucous membranes are dry.   Eyes:      Comments: Glaucoma noted   Cardiovascular:      Rate and Rhythm: Normal rate.   Abdominal:      General: Abdomen is flat. There is no distension.   Neurological:      Mental Status: He is alert.      Comments: Overall generalized illness, moving all extremities         Fluids    Intake/Output Summary (Last 24 hours) at 5/7/2025 1417  Last data filed at 5/7/2025 1300  Gross per 24 hour   Intake 236 ml   Output 2800 ml   Net -2564 ml        Laboratory  Recent Labs     05/05/25  0754 05/06/25  0033 05/06/25  2350   WBC 7.1 7.7 8.5   RBC 4.34* 4.08* 3.87*   HEMOGLOBIN 13.3* 12.2* 11.6*   HEMATOCRIT 38.6* 36.5* 35.1*   MCV 88.9 89.5 90.7   MCH 30.6 29.9 30.0   MCHC 34.5 33.4 33.0   RDW 48.3 48.7 49.3   PLATELETCT 247 237 225   MPV 10.7 10.9 10.8     Recent Labs     05/05/25  0754 05/06/25  0033 05/06/25  2350   SODIUM 136 138 137   POTASSIUM 3.7 3.6 3.5*   CHLORIDE 100 101 98   CO2 26 22 28   GLUCOSE 115* 88 80   BUN 11 9 11   CREATININE 1.12 1.37 1.05   CALCIUM 8.8 9.1 9.5                   Imaging  EC-ECHOCARDIOGRAM COMPLETE W/O CONT   Final Result           Assessment/Plan  * Hyponatremia- (present on admission)  Assessment & Plan  Likely  secondary to severe dehydration and severe hypothyroidism, sodium improving at 132, monitor          Muscle weakness (generalized)  Assessment & Plan  Insetting of severe hypothyroidism  Patient receiving IV levothyroxine followed by oral levothyroxine    Chest pain  Assessment & Plan  The ASCVD Risk score (Kelley MCLEAN, et al., 2019) failed to calculate for the following reasons:    Risk score cannot be calculated because patient has a medical history suggesting prior/existing ASCVD      Unlikely ACS  Had 1 episode of chest pain, no chest pain-free, remained asymptomatic  Troponin negative, EKG unremarkable  Telemetry monitoring  Echocardiogram for evaluation of chest pain    5/6: reports ongoing chest pressure  EKG NSR,  nonspecific ST-T changes   echo reviewed EF 55%, normal RV size and function  Tele       Hypothyroidism- (present on admission)  Assessment & Plan  TSH elevated at 60, T4 is undetectable  Patient is adamant that he takes it daily and on ES  Increase synthroid to 200 mcg daily  Recheck TFT's in 4- 6 wks    Symptomatic hypothyroidism  Currently no concern for myxedema,  Continue complain of worsening generalized weakness  I have started patient on 1 dose of 200 mcg IV thyroxine for ongoing symptomatic hypothyroidism.  I advised him to follow-up with endocrinology as outpatient for better management of hypothyroidism and diabetes.  Patient verbalized understanding.  PT/OT  Continue levothyroxine 200 mcg daily    Seizure disorder (HCC)- (present on admission)  Assessment & Plan  Continue on Depakote    Primary hypertension- (present on admission)  Assessment & Plan  No current medications  Remains on the low side, monitor closely    Class 1 obesity due to excess calories with serious comorbidity and body mass index (BMI) of 32.0 to 32.9 in adult- (present on admission)  Assessment & Plan  Outpatient nutritional counseling    Type 2 diabetes mellitus without complication, without long-term current use of insulin (HCC)- (present on admission)  Assessment & Plan  Poor control, BSs are somewhat better today, I personally reviewed these labs on 5/3  A1c is 19, education given  Hold outpatient scheduled lispro  Continue lantus 55 units QHS, ISS, hypoglycemia protocol   Advised to follow-up with outpatient endocrinology    PTSD (post-traumatic stress disorder)- (present on admission)  Assessment & Plan  Psych meds as noted above    Anemia- (present on admission)  Assessment & Plan  Monitor hemoglobin hematocrit, hemoglobin at 12.4    Anxiety and depression- (present on admission)  Assessment & Plan  Not well controlled, but no SI nor HI  Continue buspar,and Zoloft    Postoperative hypothyroidism- (present on  admission)  Assessment & Plan  S/p thyroidectomy  He is following with endocrine as outpatient         VTE prophylaxis: lovenox    I have performed a physical exam and reviewed and updated ROS and Plan today (5/7/2025). In review of yesterday's note (5/6/2025), there are no changes except as documented above.         Greater than 52 minutes spent preparing to see patient (e.g. review of tests) obtaining and/or reviewing separately obtained history. Performing a medically appropriate examination and/ evaluation.  Counseling and educating the patient/family/caregiver.  Ordering medications, tests, or procedures.  Referring and communicating with other health care professionals.  Documenting clinical information in EPIC.  Independently interpreting results and communicating results to patient/family/caregiver.  Care coordination.  Echocardiogram ordered for further evaluation of chest pain.  Ordered IV levothyroxine for symptomatic hypothyroidism

## 2025-05-07 NOTE — DISCHARGE PLANNING
Care Transition Team Discharge Planning    Anticipated Discharge Information  Discharge Disposition: D/T to SNF with Medicare cert in anticipation of skilled care (03)  Discharge Address: [address] [facility] [address]  Discharge Contact Phone Number: [phone]    Discharge Plan: Patient with 1 accepting SNF ([facility]). RNCM met with patient at bedside who reports he would be open to SNF placement at DC as recommended by PT/OT. Brochure provided to patient.     1320: Voalte to bedside RN, charge, and MD with update that [facility] will not have a Medicaid SNF bed until 5/16 as of this time. RNCM met with patient at bedside to discuss DCP. Inquired if patient would be agreeable to  SNF to which patient requested RNCM call his  with [facility]. Call placed to BOUCHRA Contreras [phone], left  requesting call back. Requested DPA to expand SNF referrals.     1359: Call back from Nay who reports she assists patient with medication management, transportation etc. Explained DCP to Diane and she states she is in agreement with whatever the patient chooses/is recommended by MD. Informed her that referrals will be sent and RNCM will contact her with any updates.

## 2025-05-07 NOTE — DISCHARGE PLANNING
-1320  KIRILL informed by Maryanne with [facility] that facility does not have a bed available today and will not have a Medicaid bed open until 5/16.     -1402  Per RN BOUCHRA, KIRILL sent SNF referrals to [facility].

## 2025-05-07 NOTE — CARE PLAN
The patient is Stable - Low risk of patient condition declining or worsening    Shift Goals  Clinical Goals: monitor BS, monitor labs, VSS  Patient Goals: rest  Family Goals: kristen    Progress made toward(s) clinical / shift goals:    Problem: Pain - Standard  Goal: Alleviation of pain or a reduction in pain to the patient’s comfort goal  Description: Target End Date:  Prior to discharge or change in level of careDocument on Vitals flowsheet1.  Document pain using the appropriate pain scale per order or unit policy2.  Educate and implement non-pharmacologic comfort measures (i.e. relaxation, distraction, massage, cold/heat therapy, etc.)3.  Pain management medications as ordered4.  Reassess pain after pain med administration per policy5.  If opiods administered assess patient's response to pain medication is appropriate per POSS sedation scale6.  Follow pain management plan developed in collaboration with patient and interdisciplinary team (including palliative care or pain specialists if applicable)  Outcome: Progressing     Problem: Knowledge Deficit - Standard  Goal: Patient and family/care givers will demonstrate understanding of plan of care, disease process/condition, diagnostic tests and medications  Description: Target End Date:  1-3 days or as soon as patient condition allowsDocument in Patient Education1.  Patient and family/caregiver oriented to unit, equipment, visitation policy and means for communicating concern2.  Complete/review Learning Assessment3.  Assess knowledge level of disease process/condition, treatment plan, diagnostic tests and medications4.  Explain disease process/condition, treatment plan, diagnostic tests and medications  Outcome: Progressing     Problem: Fall Risk  Goal: Patient will remain free from falls  Description: Target End Date:  Prior to discharge or change in level of careDocument interventions on the Mariluz Ball Fall Risk Assessment1.  Assess for fall risk factors2.   Implement fall precautions  Outcome: Progressing

## 2025-05-08 LAB
ALBUMIN SERPL BCP-MCNC: 3.7 G/DL (ref 3.2–4.9)
ANION GAP SERPL CALC-SCNC: 9 MMOL/L (ref 7–16)
BUN SERPL-MCNC: 14 MG/DL (ref 8–22)
CALCIUM ALBUM COR SERPL-MCNC: 9.9 MG/DL (ref 8.5–10.5)
CALCIUM SERPL-MCNC: 9.7 MG/DL (ref 8.5–10.5)
CHLORIDE SERPL-SCNC: 99 MMOL/L (ref 96–112)
CO2 SERPL-SCNC: 28 MMOL/L (ref 20–33)
CREAT SERPL-MCNC: 1.19 MG/DL (ref 0.5–1.4)
ERYTHROCYTE [DISTWIDTH] IN BLOOD BY AUTOMATED COUNT: 49.6 FL (ref 35.9–50)
GFR SERPLBLD CREATININE-BSD FMLA CKD-EPI: 78 ML/MIN/1.73 M 2
GLUCOSE BLD STRIP.AUTO-MCNC: 112 MG/DL (ref 65–99)
GLUCOSE BLD STRIP.AUTO-MCNC: 212 MG/DL (ref 65–99)
GLUCOSE BLD STRIP.AUTO-MCNC: 214 MG/DL (ref 65–99)
GLUCOSE BLD STRIP.AUTO-MCNC: 236 MG/DL (ref 65–99)
GLUCOSE SERPL-MCNC: 128 MG/DL (ref 65–99)
HCT VFR BLD AUTO: 36 % (ref 42–52)
HGB BLD-MCNC: 11.9 G/DL (ref 14–18)
MCH RBC QN AUTO: 30.2 PG (ref 27–33)
MCHC RBC AUTO-ENTMCNC: 33.1 G/DL (ref 32.3–36.5)
MCV RBC AUTO: 91.4 FL (ref 81.4–97.8)
PHOSPHATE SERPL-MCNC: 3.9 MG/DL (ref 2.5–4.5)
PLATELET # BLD AUTO: 213 K/UL (ref 164–446)
PMV BLD AUTO: 11 FL (ref 9–12.9)
POTASSIUM SERPL-SCNC: 4.1 MMOL/L (ref 3.6–5.5)
RBC # BLD AUTO: 3.94 M/UL (ref 4.7–6.1)
SODIUM SERPL-SCNC: 136 MMOL/L (ref 135–145)
WBC # BLD AUTO: 5.8 K/UL (ref 4.8–10.8)

## 2025-05-08 PROCEDURE — 700111 HCHG RX REV CODE 636 W/ 250 OVERRIDE (IP): Mod: JZ | Performed by: INTERNAL MEDICINE

## 2025-05-08 PROCEDURE — 80069 RENAL FUNCTION PANEL: CPT

## 2025-05-08 PROCEDURE — 99232 SBSQ HOSP IP/OBS MODERATE 35: CPT | Performed by: STUDENT IN AN ORGANIZED HEALTH CARE EDUCATION/TRAINING PROGRAM

## 2025-05-08 PROCEDURE — A9270 NON-COVERED ITEM OR SERVICE: HCPCS | Performed by: STUDENT IN AN ORGANIZED HEALTH CARE EDUCATION/TRAINING PROGRAM

## 2025-05-08 PROCEDURE — 700102 HCHG RX REV CODE 250 W/ 637 OVERRIDE(OP): Performed by: STUDENT IN AN ORGANIZED HEALTH CARE EDUCATION/TRAINING PROGRAM

## 2025-05-08 PROCEDURE — A9270 NON-COVERED ITEM OR SERVICE: HCPCS | Performed by: HOSPITALIST

## 2025-05-08 PROCEDURE — 700102 HCHG RX REV CODE 250 W/ 637 OVERRIDE(OP): Performed by: HOSPITALIST

## 2025-05-08 PROCEDURE — A9270 NON-COVERED ITEM OR SERVICE: HCPCS | Performed by: INTERNAL MEDICINE

## 2025-05-08 PROCEDURE — 700102 HCHG RX REV CODE 250 W/ 637 OVERRIDE(OP): Performed by: INTERNAL MEDICINE

## 2025-05-08 PROCEDURE — 770006 HCHG ROOM/CARE - MED/SURG/GYN SEMI*

## 2025-05-08 PROCEDURE — 36415 COLL VENOUS BLD VENIPUNCTURE: CPT

## 2025-05-08 PROCEDURE — 82962 GLUCOSE BLOOD TEST: CPT

## 2025-05-08 PROCEDURE — 85027 COMPLETE CBC AUTOMATED: CPT

## 2025-05-08 RX ORDER — ACETAMINOPHEN 325 MG/1
650 TABLET ORAL EVERY 4 HOURS PRN
Status: DISCONTINUED | OUTPATIENT
Start: 2025-05-08 | End: 2025-05-12 | Stop reason: HOSPADM

## 2025-05-08 RX ORDER — POLYETHYLENE GLYCOL 3350 17 G/17G
1 POWDER, FOR SOLUTION ORAL
Status: DISCONTINUED | OUTPATIENT
Start: 2025-05-08 | End: 2025-05-12 | Stop reason: HOSPADM

## 2025-05-08 RX ADMIN — INSULIN LISPRO 3 UNITS: 100 INJECTION, SOLUTION INTRAVENOUS; SUBCUTANEOUS at 17:45

## 2025-05-08 RX ADMIN — LURASIDONE HYDROCHLORIDE 80 MG: 80 TABLET, FILM COATED ORAL at 17:41

## 2025-05-08 RX ADMIN — BUSPIRONE HYDROCHLORIDE 5 MG: 5 TABLET ORAL at 05:00

## 2025-05-08 RX ADMIN — SERTRALINE 200 MG: 100 TABLET, FILM COATED ORAL at 05:00

## 2025-05-08 RX ADMIN — ENOXAPARIN SODIUM 40 MG: 100 INJECTION SUBCUTANEOUS at 17:41

## 2025-05-08 RX ADMIN — ATORVASTATIN CALCIUM 80 MG: 80 TABLET, FILM COATED ORAL at 20:33

## 2025-05-08 RX ADMIN — HYDROCORTISONE: 1 CREAM TOPICAL at 20:33

## 2025-05-08 RX ADMIN — DIVALPROEX SODIUM 500 MG: 500 TABLET, DELAYED RELEASE ORAL at 04:59

## 2025-05-08 RX ADMIN — INSULIN LISPRO 3 UNITS: 100 INJECTION, SOLUTION INTRAVENOUS; SUBCUTANEOUS at 20:37

## 2025-05-08 RX ADMIN — BUSPIRONE HYDROCHLORIDE 5 MG: 5 TABLET ORAL at 17:41

## 2025-05-08 RX ADMIN — HYDROCORTISONE 1 EACH: 1 CREAM TOPICAL at 08:03

## 2025-05-08 RX ADMIN — INSULIN LISPRO 3 UNITS: 100 INJECTION, SOLUTION INTRAVENOUS; SUBCUTANEOUS at 12:02

## 2025-05-08 RX ADMIN — ASPIRIN 81 MG: 81 TABLET, CHEWABLE ORAL at 05:00

## 2025-05-08 RX ADMIN — LEVOTHYROXINE SODIUM 200 MCG: 0.2 TABLET ORAL at 05:00

## 2025-05-08 RX ADMIN — NALTREXONE HYDROCHLORIDE 50 MG: 50 TABLET, FILM COATED ORAL at 04:59

## 2025-05-08 RX ADMIN — POLYETHYLENE GLYCOL 3350 1 PACKET: 17 POWDER, FOR SOLUTION ORAL at 12:31

## 2025-05-08 RX ADMIN — ACETAMINOPHEN 650 MG: 325 TABLET ORAL at 12:31

## 2025-05-08 RX ADMIN — INSULIN GLARGINE-YFGN 55 UNITS: 100 INJECTION, SOLUTION SUBCUTANEOUS at 17:45

## 2025-05-08 RX ADMIN — DIVALPROEX SODIUM 1500 MG: 500 TABLET, DELAYED RELEASE ORAL at 17:40

## 2025-05-08 RX ADMIN — FENOFIBRATE 134 MG: 134 CAPSULE ORAL at 04:59

## 2025-05-08 ASSESSMENT — ENCOUNTER SYMPTOMS
DIZZINESS: 0
NAUSEA: 0
COUGH: 0
SPUTUM PRODUCTION: 0

## 2025-05-08 ASSESSMENT — PAIN DESCRIPTION - PAIN TYPE
TYPE: ACUTE PAIN

## 2025-05-08 ASSESSMENT — FIBROSIS 4 INDEX: FIB4 SCORE: 1.19

## 2025-05-08 NOTE — DISCHARGE PLANNING
CM just received a call from [facility].  They will not have a male leaving on 05/10/25 as planned so there will NOT be a bed on 05/10/25, Saturday.    CM was told a slim possibility on Sunday.  The next scheduled dc for a male bed is not until 05/16/25.    ADDENDUM::  CM called [facility] regarding the referral and they have declined due to care exceeds their needs.    PT notes reviewed and they indicate that if pt's BP improves and tolerance for ambulation improves, he is likely to be discharged home.    Pt refusing to get up and walk today.  CM went to bedside to explain the importance of getting OOB and walking in the hallways; sitting in chair for all meals and generally being out of bed majority of the day.    As well CM explained that he would not be accepted by SNFs if he was not participating in his improvement.  I explained that the PT  notes indicated that with BP improvement and improved tolerance with FWW and ambulation he would likely be able to return home.

## 2025-05-08 NOTE — DISCHARGE PLANNING
Choice obtained for [facility].    ADDENDUM;  [id number]  Choice form faxed to KIRILL SHOOK reached out to [facility] and they do not have a male bed available until 05/10/25, Saturday.

## 2025-05-08 NOTE — CARE PLAN
The patient is Stable - Low risk of patient condition declining or worsening    Shift Goals  Clinical Goals: VSS, safety, ambulation  Patient Goals: rest  Family Goals: kristen    Progress made toward(s) clinical / shift goals:    Problem: Safety  Goal: Will remain free from injury  Outcome: Progressing  Goal: Will remain free from falls  Outcome: Progressing     Problem: Pain Management  Goal: Pain level will decrease to patient's comfort goal  Outcome: Progressing     Problem: Psychosocial Needs:  Goal: Level of anxiety will decrease  Outcome: Progressing     Problem: Respiratory:  Goal: Respiratory status will improve  Outcome: Progressing     Problem: Mobility  Goal: Risk for activity intolerance will decrease  Outcome: Progressing     Problem: Knowledge Deficit  Goal: Knowledge of disease process/condition, treatment plan, diagnostic tests, and medications will improve  Outcome: Progressing  Goal: Knowledge of the prescribed therapeutic regimen will improve  Outcome: Progressing

## 2025-05-08 NOTE — CARE PLAN
The patient is Stable - Low risk of patient condition declining or worsening    Shift Goals  Clinical Goals: maintain skin integrity, pt will increase physical activity, pt will be up in chair for atleast one meal  Patient Goals: bed bath, rest  Family Goals: JUANCARLOS    Progress made toward(s) clinical / shift goals:  Patients pain has remained controlled throughout shift through pharmacological and nonpharmacological methods of pain management. Patients pain will continue to be assessed throughout shift and controlled appropriately. Patient has remained free of falls throughout shift. Patient has proper fall preventions and protocols in place at this time. Patients fall risk will continue to be assessed each shift. Patient will continue to remain free of falls and will call appropriately. Patient compliant with sitting up in chair. Pt provided IS and is able to get to 1500.   Problem: Safety  Goal: Will remain free from injury  Outcome: Progressing  Goal: Will remain free from falls  Outcome: Progressing     Problem: Pain Management  Goal: Pain level will decrease to patient's comfort goal  Outcome: Progressing     Problem: Psychosocial Needs:  Goal: Level of anxiety will decrease  Outcome: Progressing     Problem: Respiratory:  Goal: Respiratory status will improve  Outcome: Progressing     Problem: Mobility  Goal: Risk for activity intolerance will decrease  Outcome: Progressing     Problem: Knowledge Deficit  Goal: Knowledge of disease process/condition, treatment plan, diagnostic tests, and medications will improve  Outcome: Progressing  Goal: Knowledge of the prescribed therapeutic regimen will improve  Outcome: Progressing       Patient is not progressing towards the following goals:

## 2025-05-08 NOTE — PROGRESS NOTES
Pt arrived to unit. Patient is A&Ox 4, on 1L NC, and bed alarm is on . Patient reporting a pain level of 7/10 headache, will medicate per MAR. Denies any other symptoms at this time. Call light within reach and bed in lowest position. Reinforced the need to call for assistance. Plan of care discussed and patient does not have any further needs at this time.

## 2025-05-08 NOTE — PROGRESS NOTES
Hospital Medicine Daily Progress Note    Date of Service  5/8/2025    Chief Complaint  Norm Prabhakar is a 44 y.o. male admitted 5/2/2025 with generalized weakness    Hospital Course  This is a 44 y.o. male admitted 5/2/2025 with PTSD, type 2 diabetes mellitus not controlled, major depressive disorder, insomnia , hypothyroidism status post thyroidectomy, seizure disorder who comes into the hospital with generalized weakness.  On arrival to ED patient noted to have hyponatremia, hyperglycemia, significantly elevated A1c 18.9, significantly elevated TSH 60, free T4 less than 0.11.  Initially was admitted under medical for and was transferred telemetry for evaluation of chest pain.  Subsequent EKG with bradycardia with nonspecific ST changes, negative troponin.       Interval Problem Update  No acute overnight events  Continue to have generalized weakness  Denies chest pain  Continue aspirin, atorvastatin, Depakote, levothyroxine and Zoloft  Bowel regimens  OOB  Labs reviewed stable    I have discussed this patient's plan of care and discharge plan at IDT rounds today with Case Management, Nursing, Nursing leadership, and other members of the IDT team.    Code Status  Full Code    Disposition  The patient is not medically cleared for discharge to home or a post-acute facility.      I have placed the appropriate orders for post-discharge needs.    Review of Systems  Review of Systems   Constitutional:  Positive for malaise/fatigue.   Respiratory:  Negative for cough and sputum production.    Cardiovascular:  Negative for chest pain.   Gastrointestinal:  Negative for nausea.   Neurological:  Negative for dizziness.        Physical Exam  Temp:  [36.2 °C (97.1 °F)-36.4 °C (97.6 °F)] 36.2 °C (97.1 °F)  Pulse:  [60-73] 73  Resp:  [17-18] 18  BP: ()/(59-79) 90/59  SpO2:  [91 %-97 %] 94 %    Physical Exam  Constitutional:       Appearance: He is obese. He is ill-appearing.   HENT:      Mouth/Throat:      Mouth: Mucous  membranes are dry.   Eyes:      Comments: Glaucoma noted   Cardiovascular:      Rate and Rhythm: Normal rate.   Abdominal:      General: Abdomen is flat. There is no distension.   Neurological:      Mental Status: He is alert.      Comments: Overall generalized illness, moving all extremities         Fluids    Intake/Output Summary (Last 24 hours) at 5/8/2025 1342  Last data filed at 5/8/2025 1128  Gross per 24 hour   Intake 660 ml   Output 2900 ml   Net -2240 ml        Laboratory  Recent Labs     05/06/25  0033 05/06/25  2350 05/08/25  0437   WBC 7.7 8.5 5.8   RBC 4.08* 3.87* 3.94*   HEMOGLOBIN 12.2* 11.6* 11.9*   HEMATOCRIT 36.5* 35.1* 36.0*   MCV 89.5 90.7 91.4   MCH 29.9 30.0 30.2   MCHC 33.4 33.0 33.1   RDW 48.7 49.3 49.6   PLATELETCT 237 225 213   MPV 10.9 10.8 11.0     Recent Labs     05/06/25  0033 05/06/25  2350 05/08/25  0607   SODIUM 138 137 136   POTASSIUM 3.6 3.5* 4.1   CHLORIDE 101 98 99   CO2 22 28 28   GLUCOSE 88 80 128*   BUN 9 11 14   CREATININE 1.37 1.05 1.19   CALCIUM 9.1 9.5 9.7                   Imaging  EC-ECHOCARDIOGRAM COMPLETE W/O CONT   Final Result           Assessment/Plan  * Hyponatremia- (present on admission)  Assessment & Plan  Likely  secondary to severe dehydration and severe hypothyroidism, sodium improving at 132, monitor          Muscle weakness (generalized)  Assessment & Plan  Insetting of severe hypothyroidism  Patient receiving IV levothyroxine followed by oral levothyroxine    Chest pain  Assessment & Plan  The ASCVD Risk score (Kelley MCLEAN, et al., 2019) failed to calculate for the following reasons:    Risk score cannot be calculated because patient has a medical history suggesting prior/existing ASCVD      Unlikely ACS  Had 1 episode of chest pain, no chest pain-free, remained asymptomatic  Troponin negative, EKG unremarkable  Telemetry monitoring  Echocardiogram for evaluation of chest pain    5/6: reports ongoing chest pressure  EKG NSR, nonspecific ST-T changes   echo  reviewed EF 55%, normal RV size and function  Tele       Hypothyroidism- (present on admission)  Assessment & Plan  TSH elevated at 60, T4 is undetectable  Patient is adamant that he takes it daily and on ES  Increase synthroid to 200 mcg daily  Recheck TFT's in 4- 6 wks    Symptomatic hypothyroidism  Currently no concern for myxedema,  Continue complain of worsening generalized weakness  I have started patient on 1 dose of 200 mcg IV thyroxine for ongoing symptomatic hypothyroidism.  I advised him to follow-up with endocrinology as outpatient for better management of hypothyroidism and diabetes.  Patient verbalized understanding.  PT/OT  Continue levothyroxine 200 mcg daily    Seizure disorder (HCC)- (present on admission)  Assessment & Plan  Continue on Depakote    Primary hypertension- (present on admission)  Assessment & Plan  No current medications  Remains on the low side, monitor closely    Class 1 obesity due to excess calories with serious comorbidity and body mass index (BMI) of 32.0 to 32.9 in adult- (present on admission)  Assessment & Plan  Outpatient nutritional counseling    Type 2 diabetes mellitus without complication, without long-term current use of insulin (HCC)- (present on admission)  Assessment & Plan  Poor control, BSs are somewhat better today, I personally reviewed these labs on 5/3  A1c is 19, education given  Hold outpatient scheduled lispro  Continue lantus 55 units QHS, ISS, hypoglycemia protocol   Advised to follow-up with outpatient endocrinology    PTSD (post-traumatic stress disorder)- (present on admission)  Assessment & Plan  Psych meds as noted above    Anemia- (present on admission)  Assessment & Plan  Monitor hemoglobin hematocrit, hemoglobin at 12.4    Anxiety and depression- (present on admission)  Assessment & Plan  Not well controlled, but no SI nor HI  Continue buspar,and Zoloft    Postoperative hypothyroidism- (present on admission)  Assessment & Plan  S/p  thyroidectomy  He is following with endocrine as outpatient         VTE prophylaxis: lovenox    I have performed a physical exam and reviewed and updated ROS and Plan today (5/8/2025). In review of yesterday's note (5/7/2025), there are no changes except as documented above.    My total time spent caring for the patient on the day of the encounter was  38 minutes.   This does not include time spent on separately billable procedures/tests.

## 2025-05-09 LAB
GLUCOSE BLD STRIP.AUTO-MCNC: 120 MG/DL (ref 65–99)
GLUCOSE BLD STRIP.AUTO-MCNC: 135 MG/DL (ref 65–99)
GLUCOSE BLD STRIP.AUTO-MCNC: 172 MG/DL (ref 65–99)
GLUCOSE BLD STRIP.AUTO-MCNC: 176 MG/DL (ref 65–99)

## 2025-05-09 PROCEDURE — 700102 HCHG RX REV CODE 250 W/ 637 OVERRIDE(OP): Performed by: INTERNAL MEDICINE

## 2025-05-09 PROCEDURE — 97602 WOUND(S) CARE NON-SELECTIVE: CPT

## 2025-05-09 PROCEDURE — 770006 HCHG ROOM/CARE - MED/SURG/GYN SEMI*

## 2025-05-09 PROCEDURE — 700102 HCHG RX REV CODE 250 W/ 637 OVERRIDE(OP): Performed by: STUDENT IN AN ORGANIZED HEALTH CARE EDUCATION/TRAINING PROGRAM

## 2025-05-09 PROCEDURE — 99233 SBSQ HOSP IP/OBS HIGH 50: CPT | Performed by: STUDENT IN AN ORGANIZED HEALTH CARE EDUCATION/TRAINING PROGRAM

## 2025-05-09 PROCEDURE — A9270 NON-COVERED ITEM OR SERVICE: HCPCS | Performed by: HOSPITALIST

## 2025-05-09 PROCEDURE — A9270 NON-COVERED ITEM OR SERVICE: HCPCS | Performed by: STUDENT IN AN ORGANIZED HEALTH CARE EDUCATION/TRAINING PROGRAM

## 2025-05-09 PROCEDURE — A9270 NON-COVERED ITEM OR SERVICE: HCPCS | Performed by: INTERNAL MEDICINE

## 2025-05-09 PROCEDURE — 82962 GLUCOSE BLOOD TEST: CPT

## 2025-05-09 PROCEDURE — 700111 HCHG RX REV CODE 636 W/ 250 OVERRIDE (IP): Mod: JZ | Performed by: INTERNAL MEDICINE

## 2025-05-09 PROCEDURE — 97530 THERAPEUTIC ACTIVITIES: CPT

## 2025-05-09 PROCEDURE — 700102 HCHG RX REV CODE 250 W/ 637 OVERRIDE(OP): Performed by: HOSPITALIST

## 2025-05-09 RX ORDER — LACTULOSE 10 G/15ML
15 SOLUTION ORAL 2 TIMES DAILY PRN
Status: DISCONTINUED | OUTPATIENT
Start: 2025-05-09 | End: 2025-05-12 | Stop reason: HOSPADM

## 2025-05-09 RX ORDER — MICONAZOLE NITRATE 20 MG/G
CREAM TOPICAL 2 TIMES DAILY
Status: DISCONTINUED | OUTPATIENT
Start: 2025-05-09 | End: 2025-05-12 | Stop reason: HOSPADM

## 2025-05-09 RX ADMIN — DIVALPROEX SODIUM 500 MG: 500 TABLET, DELAYED RELEASE ORAL at 05:41

## 2025-05-09 RX ADMIN — MAGNESIUM HYDROXIDE 30 ML: 2400 SUSPENSION ORAL at 17:33

## 2025-05-09 RX ADMIN — ATORVASTATIN CALCIUM 80 MG: 80 TABLET, FILM COATED ORAL at 20:23

## 2025-05-09 RX ADMIN — BUSPIRONE HYDROCHLORIDE 5 MG: 5 TABLET ORAL at 17:32

## 2025-05-09 RX ADMIN — MICONAZOLE NITRATE: 2 CREAM TOPICAL at 17:54

## 2025-05-09 RX ADMIN — HYDROCORTISONE: 1 CREAM TOPICAL at 05:41

## 2025-05-09 RX ADMIN — INSULIN LISPRO 2 UNITS: 100 INJECTION, SOLUTION INTRAVENOUS; SUBCUTANEOUS at 17:29

## 2025-05-09 RX ADMIN — FENOFIBRATE 134 MG: 134 CAPSULE ORAL at 05:41

## 2025-05-09 RX ADMIN — BUSPIRONE HYDROCHLORIDE 5 MG: 5 TABLET ORAL at 05:41

## 2025-05-09 RX ADMIN — SERTRALINE 200 MG: 100 TABLET, FILM COATED ORAL at 05:41

## 2025-05-09 RX ADMIN — NALTREXONE HYDROCHLORIDE 50 MG: 50 TABLET, FILM COATED ORAL at 05:41

## 2025-05-09 RX ADMIN — INSULIN GLARGINE-YFGN 55 UNITS: 100 INJECTION, SOLUTION SUBCUTANEOUS at 17:30

## 2025-05-09 RX ADMIN — LURASIDONE HYDROCHLORIDE 80 MG: 80 TABLET, FILM COATED ORAL at 17:32

## 2025-05-09 RX ADMIN — ASPIRIN 81 MG: 81 TABLET, CHEWABLE ORAL at 05:41

## 2025-05-09 RX ADMIN — ENOXAPARIN SODIUM 40 MG: 100 INJECTION SUBCUTANEOUS at 17:33

## 2025-05-09 RX ADMIN — INSULIN LISPRO 2 UNITS: 100 INJECTION, SOLUTION INTRAVENOUS; SUBCUTANEOUS at 22:35

## 2025-05-09 RX ADMIN — DIVALPROEX SODIUM 1500 MG: 500 TABLET, DELAYED RELEASE ORAL at 17:31

## 2025-05-09 RX ADMIN — HYDROCORTISONE: 1 CREAM TOPICAL at 17:33

## 2025-05-09 RX ADMIN — LEVOTHYROXINE SODIUM 200 MCG: 0.2 TABLET ORAL at 05:41

## 2025-05-09 ASSESSMENT — PAIN DESCRIPTION - PAIN TYPE
TYPE: ACUTE PAIN

## 2025-05-09 ASSESSMENT — GAIT ASSESSMENTS: GAIT LEVEL OF ASSIST: REFUSED

## 2025-05-09 ASSESSMENT — COGNITIVE AND FUNCTIONAL STATUS - GENERAL
WALKING IN HOSPITAL ROOM: A LITTLE
CLIMB 3 TO 5 STEPS WITH RAILING: A LITTLE
MOBILITY SCORE: 22
SUGGESTED CMS G CODE MODIFIER MOBILITY: CJ

## 2025-05-09 ASSESSMENT — ENCOUNTER SYMPTOMS
DIZZINESS: 0
SPUTUM PRODUCTION: 0
NAUSEA: 0
COUGH: 0

## 2025-05-09 NOTE — WOUND TEAM
[facility]  Inpatient Services  Wound and Skin Care Brief Evaluation    Admission Date: 5/2/2025     Last order of IP CONSULT TO WOUND CARE was found on 5/8/2025 from Hospital Encounter on 5/2/2025     HPI, PMH, SH: Reviewed    Chief Complaint   Patient presents with    Headache    Nausea    Fatigue    Loss of Appetite     Diagnosis: Hyponatremia [E87.1]    Unit where seen by Wound Team: S531/02     Wound consult placed regarding Groin, Thigh, and buttocks. Chart and images reviewed. This discussed with bedside RN. This clinician in to assess patient. Patient pleasant and agreeable. Non-selectively debrided with Perineal Wipes (Barrier wipes).     No pressure injuries or advanced wound care needs identified. Wound consult completed. No further follow up unless indicated and consulted.     Assessed - Groin - intact and blanching           Assessed - Pannus - intact and blanching     Assessed - Thigh - intact and blanching    Assessed - Buttocks - intact and blanching with raised red edges. Antifungal therapy ordered.                 PREVENTATIVE INTERVENTIONS:    Q shift Demar - performed per nursing policy  Q shift pressure point assessments - performed per nursing policy    Surface/Positioning  Standard/trauma mattress - Currently in Place    Containment/Moisture Prevention    Dri-joya pad - Currently in Place  Purwick/Condom Cath - Ordered for nursing to apply  Barrier paste - Reapplied  Antifungal treatment - Ordered for nursing to apply

## 2025-05-09 NOTE — CARE PLAN
The patient is Stable - Low risk of patient condition declining or worsening    Shift Goals  Clinical Goals: Pt will continue to report no pain  Patient Goals: Bed bath in the morning  Family Goals: JUANCARLOS    Progress made toward(s) clinical / shift goals:    Problem: Safety  Goal: Will remain free from injury  Outcome: Progressing  Pt remained free from falls and injury this shift. Bed alarm is in place and pt calls appropriately.        Patient is not progressing towards the following goals:      Problem: Mobility  Goal: Risk for activity intolerance will decrease  Outcome: Not Progressing  Pt refused to mobilize OOB this shift.

## 2025-05-09 NOTE — PROGRESS NOTES
Hospital Medicine Daily Progress Note    Date of Service  5/9/2025    Chief Complaint  Norm Prabhakar is a 44 y.o. male admitted 5/2/2025 with generalized weakness    Hospital Course  This is a 44 y.o. male admitted 5/2/2025 with PTSD, type 2 diabetes mellitus not controlled, major depressive disorder, insomnia , hypothyroidism status post thyroidectomy, seizure disorder who comes into the hospital with generalized weakness.  On arrival to ED patient noted to have hyponatremia, hyperglycemia, significantly elevated A1c 18.9, significantly elevated TSH 60, free T4 less than 0.11.  Initially was admitted under medical for and was transferred telemetry for evaluation of chest pain.  Subsequent EKG with bradycardia with nonspecific ST changes, negative troponin.   Cardiac workup negative, he has been resumed on insulin and thryoid medication and trasferred to medical floor.       Interval Problem Update  Pt seen and examined, No acute overnight events, denies specific complaints. Dizzy when standing, soft BP   Continue aspirin, atorvastatin, Depakote, levothyroxine and Zoloft  Bowel regimens  OOB with all meals, anticipate dc home pending improvement in his mobility       I have discussed this patient's plan of care and discharge plan at IDT rounds today with Case Management, Nursing, Nursing leadership, and other members of the IDT team.    Code Status  Full Code    Disposition  The patient is not medically cleared for discharge to home or a post-acute facility.  Anticipate discharge to: home with close outpatient follow-up    I have placed the appropriate orders for post-discharge needs.    Review of Systems  Review of Systems   Constitutional:  Positive for malaise/fatigue.   Respiratory:  Negative for cough and sputum production.    Cardiovascular:  Negative for chest pain.   Gastrointestinal:  Negative for nausea.   Neurological:  Negative for dizziness.        Physical Exam  Temp:  [36.2 °C (97.2 °F)-36.7 °C (98  °F)] 36.6 °C (97.8 °F)  Pulse:  [60-69] 69  Resp:  [17-18] 18  BP: ()/(57-76) 111/71  SpO2:  [93 %-96 %] 96 %    Physical Exam  Constitutional:       Appearance: He is obese. He is ill-appearing.      Comments: Chronically ill appearing, appears older than stated age   HENT:      Head: Normocephalic.      Mouth/Throat:      Mouth: Mucous membranes are moist.      Comments: Poor dentition   Eyes:      Comments: Glaucoma noted   Cardiovascular:      Rate and Rhythm: Normal rate.   Pulmonary:      Effort: Pulmonary effort is normal.   Abdominal:      General: There is no distension.      Comments: Abdominal obesity    Musculoskeletal:      Right lower leg: No edema.      Left lower leg: No edema.   Skin:     General: Skin is warm.   Neurological:      Mental Status: He is alert and oriented to person, place, and time.      Comments:  moving all extremities         Fluids    Intake/Output Summary (Last 24 hours) at 5/9/2025 1616  Last data filed at 5/9/2025 1502  Gross per 24 hour   Intake 240 ml   Output 3430 ml   Net -3190 ml        Laboratory  Recent Labs     05/06/25  2350 05/08/25  0437   WBC 8.5 5.8   RBC 3.87* 3.94*   HEMOGLOBIN 11.6* 11.9*   HEMATOCRIT 35.1* 36.0*   MCV 90.7 91.4   MCH 30.0 30.2   MCHC 33.0 33.1   RDW 49.3 49.6   PLATELETCT 225 213   MPV 10.8 11.0     Recent Labs     05/06/25  2350 05/08/25  0607   SODIUM 137 136   POTASSIUM 3.5* 4.1   CHLORIDE 98 99   CO2 28 28   GLUCOSE 80 128*   BUN 11 14   CREATININE 1.05 1.19   CALCIUM 9.5 9.7                   Imaging  EC-ECHOCARDIOGRAM COMPLETE W/O CONT   Final Result           Assessment/Plan  * Hyponatremia- (present on admission)  Assessment & Plan  Likely  secondary to severe dehydration and severe hypothyroidism, sodium improving at 132, monitor          Muscle weakness (generalized)  Assessment & Plan  Insetting of severe hypothyroidism  Patient receiving IV levothyroxine followed by oral levothyroxine  Encourage mobility     Chest  pain  Assessment & Plan  The ASCVD Risk score (Kelley MCLEAN, et al., 2019) failed to calculate for the following reasons:    Risk score cannot be calculated because patient has a medical history suggesting prior/existing ASCVD      Unlikely ACS  Had 1 episode of chest pain, no chest pain-free, remained asymptomatic  Troponin negative, EKG unremarkable  Telemetry monitoring  Echocardiogram for evaluation of chest pain    5/6: reports ongoing chest pressure  EKG NSR, nonspecific ST-T changes   echo reviewed EF 55%, normal RV size and function  Tele       Hypothyroidism- (present on admission)  Assessment & Plan  TSH elevated at 60, T4 is undetectable  Patient is adamant that he takes it daily and on ES  Increase synthroid to 200 mcg daily  Recheck TFT's in 4- 6 wks    Symptomatic hypothyroidism  Currently no concern for myxedema  Continue complain of worsening generalized weakness  I have started patient on 1 dose of 200 mcg IV thyroxine for ongoing symptomatic hypothyroidism.  I advised him to follow-up with endocrinology as outpatient for better management of hypothyroidism and diabetes.  Patient verbalized understanding.  PT/OT  Continue levothyroxine 200 mcg daily    Seizure disorder (HCC)- (present on admission)  Assessment & Plan  Continue on Depakote  Seizure and fall precautions     Primary hypertension- (present on admission)  Assessment & Plan  No current medications  Remains on the low side, monitor closely  BP soft, having some orthostatic symptoms   Encourage hydration and mobility   Compression stockings ordered     Class 1 obesity due to excess calories with serious comorbidity and body mass index (BMI) of 32.0 to 32.9 in adult- (present on admission)  Assessment & Plan  Outpatient nutritional counseling    Type 2 diabetes mellitus without complication, without long-term current use of insulin (HCC)- (present on admission)  Assessment & Plan  Poor control, BSs are somewhat better today 120s-150's  A1c is  19, education given  Hold outpatient scheduled lispro  Continue lantus 55 units QHS, ISS, hypoglycemia protocol   Advised to follow-up with outpatient endocrinology    PTSD (post-traumatic stress disorder)- (present on admission)  Assessment & Plan  Psych meds as noted above    Anemia- (present on admission)  Assessment & Plan  Monitor hemoglobin hematocrit, hemoglobin at 12.4    Anxiety and depression- (present on admission)  Assessment & Plan  Not well controlled, but no SI nor HI  Continue buspar,and Zoloft    Postoperative hypothyroidism- (present on admission)  Assessment & Plan  S/p thyroidectomy  He is following with endocrine as outpatient         VTE prophylaxis: lovenox    I have performed a physical exam and reviewed and updated ROS and Plan today (5/9/2025). In review of yesterday's note (5/8/2025), there are no changes except as documented above.

## 2025-05-09 NOTE — PROGRESS NOTES
Assumed care of patient at 1845. Bedside report received from day RN. Assessment complete.  AA&Ox4 Denies CP/SOB.  Reporting 0/10 pain. Declined intervention at this time.  Educated patient regarding pharmacologic and non pharmacologic modalities for pain management.  Skin per flowsheets  Tolerating Diabetic diet. Denies N/V.  Last BM 5/5  Pt ambulates x1 assist with FWW.  All needs met at this time. Call light within reach. Pt calls appropriately. Bed low and locked, non skid socks in place. Hourly rounding in place.

## 2025-05-09 NOTE — THERAPY
"Physical Therapy   Daily Treatment     Patient Name: Norm Prabhakar  Age:  44 y.o., Sex:  male  Medical Record #: [id number]  Today's Date: 5/9/2025     Precautions  Precautions: Fall Risk    Assessment    Patient seen for follow up PT treatment session and was able to demo STS w/FWW and CGA but was too dizzy in standing to ambulate. Patient appears to be self-limiting and has sufficient strength to ambulate when BP cooperates.     Plan    Treatment Plan Status: Continue Current Treatment Plan  Type of Treatment: Bed Mobility, Gait Training, Neuro Re-Education / Balance, Therapeutic Activities  Treatment Frequency: 3 Times per Week  Treatment Duration: Until Therapy Goals Met    DC Equipment Recommendations: Unable to determine at this time  Discharge Recommendations: Recommend post-acute placement for additional physical therapy services prior to discharge home      Subjective    \"I'm doing a bit better, just tremulous\"     Objective       05/09/25 1430   Precautions   Precautions Fall Risk   Vitals   Patient BP Position Standing 1 minute   Blood Pressure 93/69   Vitals Comments dizzy in standing   Pain 0 - 10 Group   Location Head   Location Orientation Right;Left   Therapist Pain Assessment Post Activity Pain Same as Prior to Activity;1   Cognition    Cognition / Consciousness WDL   Level of Consciousness Alert   Comments alert and following commands, but self-limiting   Active ROM Lower Body    Active ROM Lower Body  WDL   Strength Lower Body   Lower Body Strength  WDL   Sensation Lower Body   Lower Extremity Sensation   X   Paresthesia Present    Comments baseline PN   Standing Lower Body Exercises   Standing Lower Body Exercises Yes   Marching 1 set of 10   Neuro-Muscular Treatments   Neuro-Muscular Treatments Compensatory Strategies;Postural Facilitation   Other Treatments   Other Treatments Provided edcuated about the pathologies of bedrest and the importance of mobiization   Balance   Sitting Balance " (Static) Fair   Sitting Balance (Dynamic) Fair   Standing Balance (Static) Fair   Standing Balance (Dynamic) Fair -   Weight Shift Sitting Fair   Weight Shift Standing Fair   Skilled Intervention Tactile Cuing;Sequencing   Comments w/FWW   Bed Mobility    Comments found and left up in the chair   Gait Analysis   Gait Level Of Assist Refused   Functional Mobility   Sit to Stand Contact Guard Assist   Mobility EOB>STSx2, deferred 2/2 dizziness   6 Clicks Assessment - How much HELP from from another person do you currently need... (If the patient hasn't done an activity recently, how much help from another person do you think he/she would need if he/she tried?)   Turning from your back to your side while in a flat bed without using bedrails? 4   Moving from lying on your back to sitting on the side of a flat bed without using bedrails? 4   Moving to and from a bed to a chair (including a wheelchair)? 4   Standing up from a chair using your arms (e.g., wheelchair, or bedside chair)? 4   Walking in hospital room? 3   Climbing 3-5 steps with a railing? 3   6 clicks Mobility Score 22   Activity Tolerance   Sitting in Chair pre and post session   Standing 3 min   Short Term Goals    Short Term Goal # 1 Pt will ambulate x 125 feet using FWW vs no AD with supervision in 6 visits to improve functional indep.   Goal Outcome # 1 goal not met   Physical Therapy Treatment Plan   Physical Therapy Treatment Plan Continue Current Treatment Plan   Anticipated Discharge Equipment and Recommendations   DC Equipment Recommendations Unable to determine at this time   Discharge Recommendations Recommend post-acute placement for additional physical therapy services prior to discharge home     Hanh North, PT, DPT, GCS

## 2025-05-10 LAB
ANION GAP SERPL CALC-SCNC: 11 MMOL/L (ref 7–16)
BACTERIA BLD CULT: NORMAL
BUN SERPL-MCNC: 20 MG/DL (ref 8–22)
CALCIUM SERPL-MCNC: 9.6 MG/DL (ref 8.5–10.5)
CHLORIDE SERPL-SCNC: 98 MMOL/L (ref 96–112)
CO2 SERPL-SCNC: 28 MMOL/L (ref 20–33)
CORTIS SERPL-MCNC: 3.4 UG/DL (ref 0–23)
CREAT SERPL-MCNC: 1.22 MG/DL (ref 0.5–1.4)
GFR SERPLBLD CREATININE-BSD FMLA CKD-EPI: 75 ML/MIN/1.73 M 2
GLUCOSE BLD STRIP.AUTO-MCNC: 133 MG/DL (ref 65–99)
GLUCOSE BLD STRIP.AUTO-MCNC: 134 MG/DL (ref 65–99)
GLUCOSE BLD STRIP.AUTO-MCNC: 135 MG/DL (ref 65–99)
GLUCOSE BLD STRIP.AUTO-MCNC: 81 MG/DL (ref 65–99)
GLUCOSE SERPL-MCNC: 208 MG/DL (ref 65–99)
POTASSIUM SERPL-SCNC: 4.1 MMOL/L (ref 3.6–5.5)
SIGNIFICANT IND 70042: NORMAL
SITE SITE: NORMAL
SODIUM SERPL-SCNC: 137 MMOL/L (ref 135–145)
SOURCE SOURCE: NORMAL

## 2025-05-10 PROCEDURE — 700102 HCHG RX REV CODE 250 W/ 637 OVERRIDE(OP): Performed by: HOSPITALIST

## 2025-05-10 PROCEDURE — 82533 TOTAL CORTISOL: CPT

## 2025-05-10 PROCEDURE — A9270 NON-COVERED ITEM OR SERVICE: HCPCS | Performed by: INTERNAL MEDICINE

## 2025-05-10 PROCEDURE — 700105 HCHG RX REV CODE 258

## 2025-05-10 PROCEDURE — 700102 HCHG RX REV CODE 250 W/ 637 OVERRIDE(OP): Performed by: STUDENT IN AN ORGANIZED HEALTH CARE EDUCATION/TRAINING PROGRAM

## 2025-05-10 PROCEDURE — 82962 GLUCOSE BLOOD TEST: CPT | Mod: 91

## 2025-05-10 PROCEDURE — A9270 NON-COVERED ITEM OR SERVICE: HCPCS | Performed by: HOSPITALIST

## 2025-05-10 PROCEDURE — 80048 BASIC METABOLIC PNL TOTAL CA: CPT

## 2025-05-10 PROCEDURE — 770006 HCHG ROOM/CARE - MED/SURG/GYN SEMI*

## 2025-05-10 PROCEDURE — 36415 COLL VENOUS BLD VENIPUNCTURE: CPT

## 2025-05-10 PROCEDURE — 700111 HCHG RX REV CODE 636 W/ 250 OVERRIDE (IP): Mod: JZ | Performed by: INTERNAL MEDICINE

## 2025-05-10 PROCEDURE — A9270 NON-COVERED ITEM OR SERVICE: HCPCS | Performed by: STUDENT IN AN ORGANIZED HEALTH CARE EDUCATION/TRAINING PROGRAM

## 2025-05-10 PROCEDURE — 99232 SBSQ HOSP IP/OBS MODERATE 35: CPT | Performed by: STUDENT IN AN ORGANIZED HEALTH CARE EDUCATION/TRAINING PROGRAM

## 2025-05-10 PROCEDURE — 700102 HCHG RX REV CODE 250 W/ 637 OVERRIDE(OP): Performed by: INTERNAL MEDICINE

## 2025-05-10 RX ORDER — SODIUM CHLORIDE 9 MG/ML
500 INJECTION, SOLUTION INTRAVENOUS ONCE
Status: COMPLETED | OUTPATIENT
Start: 2025-05-10 | End: 2025-05-10

## 2025-05-10 RX ADMIN — HYDROCORTISONE: 1 CREAM TOPICAL at 05:02

## 2025-05-10 RX ADMIN — INSULIN GLARGINE-YFGN 55 UNITS: 100 INJECTION, SOLUTION SUBCUTANEOUS at 18:03

## 2025-05-10 RX ADMIN — ASPIRIN 81 MG: 81 TABLET, CHEWABLE ORAL at 05:01

## 2025-05-10 RX ADMIN — MICONAZOLE NITRATE: 2 CREAM TOPICAL at 05:02

## 2025-05-10 RX ADMIN — ENOXAPARIN SODIUM 40 MG: 100 INJECTION SUBCUTANEOUS at 17:54

## 2025-05-10 RX ADMIN — DIVALPROEX SODIUM 1500 MG: 500 TABLET, DELAYED RELEASE ORAL at 17:52

## 2025-05-10 RX ADMIN — LEVOTHYROXINE SODIUM 200 MCG: 0.2 TABLET ORAL at 05:00

## 2025-05-10 RX ADMIN — BUSPIRONE HYDROCHLORIDE 5 MG: 5 TABLET ORAL at 17:52

## 2025-05-10 RX ADMIN — BUSPIRONE HYDROCHLORIDE 5 MG: 5 TABLET ORAL at 05:00

## 2025-05-10 RX ADMIN — SODIUM CHLORIDE 500 ML: 9 INJECTION, SOLUTION INTRAVENOUS at 05:08

## 2025-05-10 RX ADMIN — LURASIDONE HYDROCHLORIDE 80 MG: 80 TABLET, FILM COATED ORAL at 17:52

## 2025-05-10 RX ADMIN — SERTRALINE 200 MG: 100 TABLET, FILM COATED ORAL at 04:59

## 2025-05-10 RX ADMIN — FENOFIBRATE 134 MG: 134 CAPSULE ORAL at 05:00

## 2025-05-10 RX ADMIN — ATORVASTATIN CALCIUM 80 MG: 80 TABLET, FILM COATED ORAL at 23:31

## 2025-05-10 RX ADMIN — DIVALPROEX SODIUM 500 MG: 500 TABLET, DELAYED RELEASE ORAL at 04:59

## 2025-05-10 RX ADMIN — NALTREXONE HYDROCHLORIDE 50 MG: 50 TABLET, FILM COATED ORAL at 05:00

## 2025-05-10 ASSESSMENT — PAIN DESCRIPTION - PAIN TYPE
TYPE: ACUTE PAIN
TYPE: CHRONIC PAIN
TYPE: ACUTE PAIN
TYPE: ACUTE PAIN

## 2025-05-10 ASSESSMENT — ENCOUNTER SYMPTOMS
COUGH: 0
SPUTUM PRODUCTION: 0
DIZZINESS: 0
NAUSEA: 0

## 2025-05-10 NOTE — PROGRESS NOTES
Hospital Medicine Daily Progress Note    Date of Service  5/10/2025    Chief Complaint  Norm Prabhakar is a 44 y.o. male admitted 5/2/2025 with generalized weakness    Hospital Course  This is a 44 y.o. male admitted 5/2/2025 with PTSD, type 2 diabetes mellitus not controlled, major depressive disorder, insomnia , hypothyroidism status post thyroidectomy, seizure disorder who comes into the hospital with generalized weakness.  On arrival to ED patient noted to have hyponatremia, hyperglycemia, significantly elevated A1c 18.9, significantly elevated TSH 60, free T4 less than 0.11.  Initially was admitted under medical for and was transferred telemetry for evaluation of chest pain.  Subsequent EKG with bradycardia with nonspecific ST changes, negative troponin.   Cardiac workup negative, he has been resumed on insulin and thryoid medication and trasferred to medical floor.       Interval Problem Update  Pt seen and examined, No acute overnight events, denies specific complaints. Reports dizziness with standing  - orthostatics BP showing improvement in BP with standing.   Continue aspirin, atorvastatin, Depakote, levothyroxine and Zoloft  Bowel regimens  - BG stable.   OOB with all meals, anticipate dc home in the next 24 hours pending improvement in his mobility      I have discussed this patient's plan of care and discharge plan at IDT rounds today with Case Management, Nursing, Nursing leadership, and other members of the IDT team.    Code Status  Full Code    Disposition  The patient is not medically cleared for discharge to home or a post-acute facility.      I have placed the appropriate orders for post-discharge needs.    Review of Systems  Review of Systems   Constitutional:  Positive for malaise/fatigue.   Respiratory:  Negative for cough and sputum production.    Cardiovascular:  Negative for chest pain.   Gastrointestinal:  Negative for nausea.   Neurological:  Negative for dizziness.        Physical  Exam  Temp:  [36.4 °C (97.6 °F)-36.9 °C (98.4 °F)] 36.9 °C (98.4 °F)  Pulse:  [56-69] 68  Resp:  [17-18] 17  BP: ()/(59-76) 114/70  SpO2:  [95 %-96 %] 95 %    Physical Exam  Constitutional:       Appearance: He is obese. He is ill-appearing.      Comments: Chronically ill appearing, appears older than stated age   HENT:      Head: Normocephalic.      Mouth/Throat:      Mouth: Mucous membranes are moist.      Comments: Poor dentition   Eyes:      Comments: Glaucoma noted   Cardiovascular:      Rate and Rhythm: Normal rate.   Pulmonary:      Effort: Pulmonary effort is normal.   Abdominal:      General: There is no distension.      Comments: Abdominal obesity    Musculoskeletal:      Right lower leg: No edema.      Left lower leg: No edema.   Skin:     General: Skin is warm.   Neurological:      Mental Status: He is alert and oriented to person, place, and time.      Comments:  moving all extremities         Fluids    Intake/Output Summary (Last 24 hours) at 5/10/2025 1427  Last data filed at 5/10/2025 0856  Gross per 24 hour   Intake 1310 ml   Output 3200 ml   Net -1890 ml        Laboratory  Recent Labs     05/08/25  0437   WBC 5.8   RBC 3.94*   HEMOGLOBIN 11.9*   HEMATOCRIT 36.0*   MCV 91.4   MCH 30.2   MCHC 33.1   RDW 49.6   PLATELETCT 213   MPV 11.0     Recent Labs     05/08/25  0607 05/10/25  0018   SODIUM 136 137   POTASSIUM 4.1 4.1   CHLORIDE 99 98   CO2 28 28   GLUCOSE 128* 208*   BUN 14 20   CREATININE 1.19 1.22   CALCIUM 9.7 9.6                   Imaging  EC-ECHOCARDIOGRAM COMPLETE W/O CONT   Final Result           Assessment/Plan  * Hyponatremia- (present on admission)  Assessment & Plan  Likely  secondary to severe dehydration and severe hypothyroidism, sodium improving at 132, monitor          Muscle weakness (generalized)  Assessment & Plan  Insetting of severe hypothyroidism  Patient receiving IV levothyroxine followed by oral levothyroxine  Encourage mobility     Chest pain  Assessment &  Plan  The ASCVD Risk score (Kelley MCLEAN, et al., 2019) failed to calculate for the following reasons:    Risk score cannot be calculated because patient has a medical history suggesting prior/existing ASCVD      Unlikely ACS  Had 1 episode of chest pain, no chest pain-free, remained asymptomatic  Troponin negative, EKG unremarkable  Telemetry monitoring  Echocardiogram for evaluation of chest pain    5/6: reports ongoing chest pressure  EKG NSR, nonspecific ST-T changes   echo reviewed EF 55%, normal RV size and function  Tele       Hypothyroidism- (present on admission)  Assessment & Plan  TSH elevated at 60, T4 is undetectable  Patient is adamant that he takes it daily and on ES  Increase synthroid to 200 mcg daily  Recheck TFT's in 4- 6 wks    Symptomatic hypothyroidism  Currently no concern for myxedema  Continue complain of worsening generalized weakness  I have started patient on 1 dose of 200 mcg IV thyroxine for ongoing symptomatic hypothyroidism.  I advised him to follow-up with endocrinology as outpatient for better management of hypothyroidism and diabetes.  Patient verbalized understanding.  PT/OT  Continue levothyroxine 200 mcg daily    Seizure disorder (HCC)- (present on admission)  Assessment & Plan  Continue on Depakote  Seizure and fall precautions     Primary hypertension- (present on admission)  Assessment & Plan  No current medications  Remains on the low side, monitor closely  BP soft, having some orthostatic symptoms   Encourage hydration and mobility   Compression stockings ordered     Class 1 obesity due to excess calories with serious comorbidity and body mass index (BMI) of 32.0 to 32.9 in adult- (present on admission)  Assessment & Plan  Outpatient nutritional counseling    Type 2 diabetes mellitus without complication, without long-term current use of insulin (HCC)- (present on admission)  Assessment & Plan  Poor control, BSs are somewhat better today 120s-150's  A1c is 19, education  given  Hold outpatient scheduled lispro  Continue lantus 55 units QHS, ISS, hypoglycemia protocol   Advised to follow-up with outpatient endocrinology    PTSD (post-traumatic stress disorder)- (present on admission)  Assessment & Plan  Psych meds as noted above    Anemia- (present on admission)  Assessment & Plan  Monitor hemoglobin hematocrit, hemoglobin at 12.4    Anxiety and depression- (present on admission)  Assessment & Plan  Not well controlled, but no SI nor HI  Continue buspar,and Zoloft    Postoperative hypothyroidism- (present on admission)  Assessment & Plan  S/p thyroidectomy  He is following with endocrine as outpatient         VTE prophylaxis: lovenox    I have performed a physical exam and reviewed and updated ROS and Plan today (5/10/2025). In review of yesterday's note (5/9/2025), there are no changes except as documented above.

## 2025-05-10 NOTE — CARE PLAN
The patient is Watcher - Medium risk of patient condition declining or worsening    Shift Goals  Clinical Goals: pt will continue to tolerate finger sticks for blood sugar  Patient Goals: bed bath today  Family Goals: JUANCARLOS    Progress made toward(s) clinical / shift goals:    Pt only needed insulin coverage once today.    Problem: Safety  Goal: Will remain free from injury  Outcome: Progressing     Problem: Mobility  Goal: Risk for activity intolerance will decrease  Outcome: Progressing  Note: Pt is A/Ox4. Pt uses call light appropriately. Pt does not report dizziness when ambulating. Pt was up in his chair for lunch and dinner today. Pt educated on fall risk and when to call for assistance.        Patient is not progressing towards the following goals:

## 2025-05-10 NOTE — CARE PLAN
The patient is Stable - Low risk of patient condition declining or worsening    Shift Goals  Clinical Goals: Pt will not have a fall during the night  Patient Goals: bed bath  Family Goals: JUANCARLOS    Progress made toward(s) clinical / shift goals:  Pt called appropriately, does not get out of bed without assistance. No fall during the night.     Patient is not progressing towards the following goals:

## 2025-05-10 NOTE — PROGRESS NOTES
Rec'd report from day shift RN. Assumed pt care. Assessment completed. AA&OX4. 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 Pt is 1 assist w/ FWW for ambulation. Rash noted to inner thigh and leg area. Sacrum is pink with small rash present. Bed in lowest position, bed locked, bed alarm on for safety, treaded socks in place, RN and CNA numbers provided, call light within reach.

## 2025-05-11 LAB
GLUCOSE BLD STRIP.AUTO-MCNC: 145 MG/DL (ref 65–99)
GLUCOSE BLD STRIP.AUTO-MCNC: 167 MG/DL (ref 65–99)
GLUCOSE BLD STRIP.AUTO-MCNC: 169 MG/DL (ref 65–99)
GLUCOSE BLD STRIP.AUTO-MCNC: 81 MG/DL (ref 65–99)

## 2025-05-11 PROCEDURE — 700102 HCHG RX REV CODE 250 W/ 637 OVERRIDE(OP): Performed by: HOSPITALIST

## 2025-05-11 PROCEDURE — A9270 NON-COVERED ITEM OR SERVICE: HCPCS | Performed by: HOSPITALIST

## 2025-05-11 PROCEDURE — 700102 HCHG RX REV CODE 250 W/ 637 OVERRIDE(OP): Performed by: STUDENT IN AN ORGANIZED HEALTH CARE EDUCATION/TRAINING PROGRAM

## 2025-05-11 PROCEDURE — 770006 HCHG ROOM/CARE - MED/SURG/GYN SEMI*

## 2025-05-11 PROCEDURE — A9270 NON-COVERED ITEM OR SERVICE: HCPCS | Performed by: STUDENT IN AN ORGANIZED HEALTH CARE EDUCATION/TRAINING PROGRAM

## 2025-05-11 PROCEDURE — 99232 SBSQ HOSP IP/OBS MODERATE 35: CPT | Performed by: STUDENT IN AN ORGANIZED HEALTH CARE EDUCATION/TRAINING PROGRAM

## 2025-05-11 PROCEDURE — 700102 HCHG RX REV CODE 250 W/ 637 OVERRIDE(OP): Performed by: INTERNAL MEDICINE

## 2025-05-11 PROCEDURE — 82962 GLUCOSE BLOOD TEST: CPT

## 2025-05-11 PROCEDURE — 700111 HCHG RX REV CODE 636 W/ 250 OVERRIDE (IP): Mod: JZ | Performed by: INTERNAL MEDICINE

## 2025-05-11 PROCEDURE — A9270 NON-COVERED ITEM OR SERVICE: HCPCS | Performed by: INTERNAL MEDICINE

## 2025-05-11 RX ADMIN — DIVALPROEX SODIUM 500 MG: 500 TABLET, DELAYED RELEASE ORAL at 05:56

## 2025-05-11 RX ADMIN — MICONAZOLE NITRATE: 2 CREAM TOPICAL at 06:00

## 2025-05-11 RX ADMIN — POLYETHYLENE GLYCOL 3350 1 PACKET: 17 POWDER, FOR SOLUTION ORAL at 15:58

## 2025-05-11 RX ADMIN — SERTRALINE 200 MG: 100 TABLET, FILM COATED ORAL at 05:56

## 2025-05-11 RX ADMIN — HYDROCORTISONE: 1 CREAM TOPICAL at 17:46

## 2025-05-11 RX ADMIN — NALTREXONE HYDROCHLORIDE 50 MG: 50 TABLET, FILM COATED ORAL at 05:56

## 2025-05-11 RX ADMIN — FENOFIBRATE 134 MG: 134 CAPSULE ORAL at 05:55

## 2025-05-11 RX ADMIN — LURASIDONE HYDROCHLORIDE 80 MG: 80 TABLET, FILM COATED ORAL at 17:46

## 2025-05-11 RX ADMIN — ATORVASTATIN CALCIUM 80 MG: 80 TABLET, FILM COATED ORAL at 20:37

## 2025-05-11 RX ADMIN — INSULIN GLARGINE-YFGN 55 UNITS: 100 INJECTION, SOLUTION SUBCUTANEOUS at 17:47

## 2025-05-11 RX ADMIN — BUSPIRONE HYDROCHLORIDE 5 MG: 5 TABLET ORAL at 05:55

## 2025-05-11 RX ADMIN — INSULIN LISPRO 2 UNITS: 100 INJECTION, SOLUTION INTRAVENOUS; SUBCUTANEOUS at 17:47

## 2025-05-11 RX ADMIN — MICONAZOLE NITRATE: 2 CREAM TOPICAL at 18:00

## 2025-05-11 RX ADMIN — HYDROCORTISONE: 1 CREAM TOPICAL at 06:01

## 2025-05-11 RX ADMIN — ENOXAPARIN SODIUM 40 MG: 100 INJECTION SUBCUTANEOUS at 17:46

## 2025-05-11 RX ADMIN — INSULIN LISPRO 2 UNITS: 100 INJECTION, SOLUTION INTRAVENOUS; SUBCUTANEOUS at 23:26

## 2025-05-11 RX ADMIN — DIVALPROEX SODIUM 1500 MG: 500 TABLET, DELAYED RELEASE ORAL at 17:46

## 2025-05-11 RX ADMIN — LEVOTHYROXINE SODIUM 200 MCG: 0.2 TABLET ORAL at 05:56

## 2025-05-11 RX ADMIN — BUSPIRONE HYDROCHLORIDE 5 MG: 5 TABLET ORAL at 17:46

## 2025-05-11 RX ADMIN — ASPIRIN 81 MG: 81 TABLET, CHEWABLE ORAL at 05:56

## 2025-05-11 ASSESSMENT — ENCOUNTER SYMPTOMS
DIZZINESS: 0
NAUSEA: 0
SPUTUM PRODUCTION: 0
COUGH: 0

## 2025-05-11 ASSESSMENT — COGNITIVE AND FUNCTIONAL STATUS - GENERAL
SUGGESTED CMS G CODE MODIFIER MOBILITY: CJ
CLIMB 3 TO 5 STEPS WITH RAILING: A LITTLE
PERSONAL GROOMING: A LITTLE
SUGGESTED CMS G CODE MODIFIER DAILY ACTIVITY: CK
TOILETING: A LOT
WALKING IN HOSPITAL ROOM: A LITTLE
EATING MEALS: A LITTLE
DRESSING REGULAR LOWER BODY CLOTHING: A LOT
DRESSING REGULAR UPPER BODY CLOTHING: A LITTLE
HELP NEEDED FOR BATHING: A LOT
DAILY ACTIVITIY SCORE: 15
MOBILITY SCORE: 22

## 2025-05-11 ASSESSMENT — PAIN DESCRIPTION - PAIN TYPE
TYPE: ACUTE PAIN

## 2025-05-11 ASSESSMENT — FIBROSIS 4 INDEX: FIB4 SCORE: 1.25

## 2025-05-11 NOTE — CARE PLAN
"  Problem: Safety  Goal: Will remain free from injury  5/10/2025 1846 by Ritu Barrios, R.N.  Outcome: Progressing  5/10/2025 1524 by STEVE GrijalvaN.  Outcome: Progressing  Goal: Will remain free from falls  Outcome: Progressing     Problem: Mobility  Goal: Risk for activity intolerance will decrease  Outcome: Progressing   The patient is Stable - Low risk of patient condition declining or worsening    Shift Goals  Clinical Goals: ambulate in hallways  Patient Goals: get better and go home  Family Goals: JUANCARLOS    Progress made toward(s) clinical / shift goals:  ambulated ~20 ft in hallways with 1 person assist FWW, pt unsteady and states legs feeling \"wobbly\". Needs encouragement to move, instructed pt of plan of care and he is agreeable    Patient is not progressing towards the following goals:      "

## 2025-05-11 NOTE — PROGRESS NOTES
"Received alert and oriented x 4. Check vitals sign and recorded accordingly and due med given per MAR. Monitor sign and symptoms of respiratory distress and treatment given accordingly per MAR.Medicated per MAR and reassessed every 2 hours per protocol. Call light within reach. Bed alarm in placed. Needs attended.Continue to monitor.BP 95/59   Pulse 75   Temp 36.7 °C (98 °F) (Temporal)   Resp 17   Ht 1.981 m (6' 6\")   Wt 115 kg (253 lb 8.5 oz)   SpO2 94%   BMI 29.30 kg/m² .. Encouraged to turned side to side.  "

## 2025-05-11 NOTE — PROGRESS NOTES
Received report and assumed care of patient at change of shift. Patient is A&Ox4, on ra, and reports no pain at this time. Patient assessment completed, bed in lowest position, and call light and personal belongings are within reach. Patient expressed no further needs at this time.

## 2025-05-11 NOTE — CARE PLAN
The patient is Stable - Low risk of patient condition declining or worsening    Shift Goals  Clinical Goals: safe mobility, BG control  Patient Goals: go home  Family Goals: JUANCARLOS    Progress made toward(s) clinical / shift goals:  education done on POC, all questions and concerns were addressed.       Problem: Pain Management  Goal: Pain level will decrease to patient's comfort goal  Outcome: Progressing     Problem: Psychosocial Needs:  Goal: Level of anxiety will decrease  Outcome: Progressing     Problem: Knowledge Deficit  Goal: Knowledge of disease process/condition, treatment plan, diagnostic tests, and medications will improve  Outcome: Progressing       Patient is not progressing towards the following goals:

## 2025-05-11 NOTE — PROGRESS NOTES
Hospital Medicine Daily Progress Note    Date of Service  5/11/2025    Chief Complaint  Norm Prabhakar is a 44 y.o. male admitted 5/2/2025 with generalized weakness    Hospital Course  This is a 44 y.o. male admitted 5/2/2025 with PTSD, type 2 diabetes mellitus not controlled, major depressive disorder, insomnia , hypothyroidism status post thyroidectomy, seizure disorder who comes into the hospital with generalized weakness.  On arrival to ED patient noted to have hyponatremia, hyperglycemia, significantly elevated A1c 18.9, significantly elevated TSH 60, free T4 less than 0.11.  Initially was admitted under medical for and was transferred telemetry for evaluation of chest pain.  Subsequent EKG with bradycardia with nonspecific ST changes, negative troponin.   Cardiac workup negative, he has been resumed on insulin and thryoid medication and trasferred to medical floor.       Interval Problem Update  Pt seen and examined, No acute overnight events, denies specific complaints.  Back pain when sitting in chair. Unstable mobilizing, needs walker. Will try and get HH services   - BG stable.   OOB with all meals, anticipate dc home in the next 24 hours pending improvement in his mobility      I have discussed this patient's plan of care and discharge plan at IDT rounds today with Case Management, Nursing, Nursing leadership, and other members of the IDT team.    Code Status  Full Code    Disposition  The patient is medically cleared for discharge to home or a post-acute facility.  Anticipate discharge to: home with organized home healthcare and close outpatient follow-up    I have placed the appropriate orders for post-discharge needs.    Review of Systems  Review of Systems   Constitutional:  Positive for malaise/fatigue.   Respiratory:  Negative for cough and sputum production.    Cardiovascular:  Negative for chest pain.   Gastrointestinal:  Negative for nausea.   Neurological:  Negative for dizziness.         Physical Exam  Temp:  [36 °C (96.8 °F)-36.8 °C (98.2 °F)] 36.6 °C (97.9 °F)  Pulse:  [60-75] 63  Resp:  [17-18] 17  BP: ()/(57-72) 104/61  SpO2:  [93 %-95 %] 95 %    Physical Exam  Constitutional:       Appearance: He is obese. He is ill-appearing.      Comments: Chronically ill appearing, appears older than stated age   HENT:      Head: Normocephalic.      Mouth/Throat:      Mouth: Mucous membranes are moist.      Comments: Poor dentition   Eyes:      Comments: Glaucoma noted   Cardiovascular:      Rate and Rhythm: Normal rate.   Pulmonary:      Effort: Pulmonary effort is normal.   Abdominal:      General: There is no distension.      Comments: Abdominal obesity    Musculoskeletal:      Right lower leg: No edema.      Left lower leg: No edema.   Skin:     General: Skin is warm.   Neurological:      Mental Status: He is alert and oriented to person, place, and time.      Comments:  moving all extremities         Fluids    Intake/Output Summary (Last 24 hours) at 5/11/2025 1459  Last data filed at 5/11/2025 1400  Gross per 24 hour   Intake 810 ml   Output 1875 ml   Net -1065 ml        Laboratory        Recent Labs     05/10/25  0018   SODIUM 137   POTASSIUM 4.1   CHLORIDE 98   CO2 28   GLUCOSE 208*   BUN 20   CREATININE 1.22   CALCIUM 9.6                   Imaging  EC-ECHOCARDIOGRAM COMPLETE W/O CONT   Final Result           Assessment/Plan  * Hyponatremia- (present on admission)  Assessment & Plan  Likely  secondary to severe dehydration and severe hypothyroidism, sodium improving at 132, monitor          Muscle weakness (generalized)  Assessment & Plan  Insetting of severe hypothyroidism  Patient receiving IV levothyroxine followed by oral levothyroxine  Encourage mobility     Chest pain  Assessment & Plan  The ASCVD Risk score (Kelley MCLEAN, et al., 2019) failed to calculate for the following reasons:    Risk score cannot be calculated because patient has a medical history suggesting prior/existing  ASCVD      Unlikely ACS  Had 1 episode of chest pain, no chest pain-free, remained asymptomatic  Troponin negative, EKG unremarkable  Telemetry monitoring  Echocardiogram for evaluation of chest pain    5/6: reports ongoing chest pressure  EKG NSR, nonspecific ST-T changes   echo reviewed EF 55%, normal RV size and function  Tele       Hypothyroidism- (present on admission)  Assessment & Plan  TSH elevated at 60, T4 is undetectable  Patient is adamant that he takes it daily and on ES  Increase synthroid to 200 mcg daily  Recheck TFT's in 4- 6 wks    Symptomatic hypothyroidism  Currently no concern for myxedema  Continue complain of worsening generalized weakness  I have started patient on 1 dose of 200 mcg IV thyroxine for ongoing symptomatic hypothyroidism.  I advised him to follow-up with endocrinology as outpatient for better management of hypothyroidism and diabetes.  Patient verbalized understanding.  PT/OT  Continue levothyroxine 200 mcg daily    Seizure disorder (HCC)- (present on admission)  Assessment & Plan  Continue on Depakote  Seizure and fall precautions     Primary hypertension- (present on admission)  Assessment & Plan  No current medications  Remains on the low side, monitor closely  BP soft, having some orthostatic symptoms   Encourage hydration and mobility   Compression stockings ordered     Class 1 obesity due to excess calories with serious comorbidity and body mass index (BMI) of 32.0 to 32.9 in adult- (present on admission)  Assessment & Plan  Outpatient nutritional counseling    Type 2 diabetes mellitus without complication, without long-term current use of insulin (HCC)- (present on admission)  Assessment & Plan  Poor control, BSs are somewhat better today 120s-150's  A1c is 19, education given  Hold outpatient scheduled lispro  Continue lantus 55 units QHS, ISS, hypoglycemia protocol   Advised to follow-up with outpatient endocrinology    PTSD (post-traumatic stress disorder)- (present on  admission)  Assessment & Plan  Psych meds as noted above    Anemia- (present on admission)  Assessment & Plan  Monitor hemoglobin hematocrit, hemoglobin at 12.4    Anxiety and depression- (present on admission)  Assessment & Plan  Not well controlled, but no SI nor HI  Continue buspar,and Zoloft    Postoperative hypothyroidism- (present on admission)  Assessment & Plan  S/p thyroidectomy  He is following with endocrine as outpatient         VTE prophylaxis: lovenox    I have performed a physical exam and reviewed and updated ROS and Plan today (5/11/2025). In review of yesterday's note (5/10/2025), there are no changes except as documented above.

## 2025-05-11 NOTE — CARE PLAN
The patient is Stable - Low risk of patient condition declining or worsening    Shift Goals  Clinical Goals: safety, mobility and blood pressure  Patient Goals: home soon  Family Goals: JUANCARLOS    Progress made toward(s) clinical / shift goals:  better sbp > 90's, stayed in bed the whole shift.    Patient is not progressing towards the following goals:

## 2025-05-12 ENCOUNTER — PHARMACY VISIT (OUTPATIENT)
Dept: PHARMACY | Facility: MEDICAL CENTER | Age: 45
End: 2025-05-12
Payer: COMMERCIAL

## 2025-05-12 VITALS
TEMPERATURE: 98.7 F | WEIGHT: 253.31 LBS | HEART RATE: 70 BPM | BODY MASS INDEX: 29.31 KG/M2 | HEIGHT: 78 IN | DIASTOLIC BLOOD PRESSURE: 62 MMHG | OXYGEN SATURATION: 97 % | SYSTOLIC BLOOD PRESSURE: 98 MMHG | RESPIRATION RATE: 16 BRPM

## 2025-05-12 LAB
GLUCOSE BLD STRIP.AUTO-MCNC: 131 MG/DL (ref 65–99)
GLUCOSE BLD STRIP.AUTO-MCNC: 84 MG/DL (ref 65–99)

## 2025-05-12 PROCEDURE — 82962 GLUCOSE BLOOD TEST: CPT

## 2025-05-12 PROCEDURE — A9270 NON-COVERED ITEM OR SERVICE: HCPCS | Performed by: INTERNAL MEDICINE

## 2025-05-12 PROCEDURE — RXMED WILLOW AMBULATORY MEDICATION CHARGE: Performed by: STUDENT IN AN ORGANIZED HEALTH CARE EDUCATION/TRAINING PROGRAM

## 2025-05-12 PROCEDURE — 700102 HCHG RX REV CODE 250 W/ 637 OVERRIDE(OP): Performed by: STUDENT IN AN ORGANIZED HEALTH CARE EDUCATION/TRAINING PROGRAM

## 2025-05-12 PROCEDURE — 99239 HOSP IP/OBS DSCHRG MGMT >30: CPT | Performed by: STUDENT IN AN ORGANIZED HEALTH CARE EDUCATION/TRAINING PROGRAM

## 2025-05-12 PROCEDURE — 700102 HCHG RX REV CODE 250 W/ 637 OVERRIDE(OP): Performed by: INTERNAL MEDICINE

## 2025-05-12 PROCEDURE — A9270 NON-COVERED ITEM OR SERVICE: HCPCS | Performed by: HOSPITALIST

## 2025-05-12 PROCEDURE — A9270 NON-COVERED ITEM OR SERVICE: HCPCS | Performed by: STUDENT IN AN ORGANIZED HEALTH CARE EDUCATION/TRAINING PROGRAM

## 2025-05-12 PROCEDURE — 700102 HCHG RX REV CODE 250 W/ 637 OVERRIDE(OP): Performed by: HOSPITALIST

## 2025-05-12 RX ORDER — INSULIN GLARGINE 100 [IU]/ML
55 INJECTION, SOLUTION SUBCUTANEOUS EVERY EVENING
Qty: 15 ML | Refills: 3 | Status: ACTIVE | OUTPATIENT
Start: 2025-05-12

## 2025-05-12 RX ORDER — INSULIN LISPRO 100 [IU]/ML
6 INJECTION, SOLUTION INTRAVENOUS; SUBCUTANEOUS
Qty: 15 ML | Refills: 3 | Status: ACTIVE | OUTPATIENT
Start: 2025-05-12

## 2025-05-12 RX ORDER — LEVOTHYROXINE SODIUM 200 UG/1
200 TABLET ORAL
Qty: 30 TABLET | Refills: 3 | Status: SHIPPED | OUTPATIENT
Start: 2025-05-12

## 2025-05-12 RX ADMIN — DIVALPROEX SODIUM 500 MG: 500 TABLET, DELAYED RELEASE ORAL at 05:49

## 2025-05-12 RX ADMIN — HYDROCORTISONE: 1 CREAM TOPICAL at 05:49

## 2025-05-12 RX ADMIN — ASPIRIN 81 MG: 81 TABLET, CHEWABLE ORAL at 05:48

## 2025-05-12 RX ADMIN — NALTREXONE HYDROCHLORIDE 50 MG: 50 TABLET, FILM COATED ORAL at 05:48

## 2025-05-12 RX ADMIN — SERTRALINE 200 MG: 100 TABLET, FILM COATED ORAL at 05:49

## 2025-05-12 RX ADMIN — MICONAZOLE NITRATE: 2 CREAM TOPICAL at 05:49

## 2025-05-12 RX ADMIN — BUSPIRONE HYDROCHLORIDE 5 MG: 5 TABLET ORAL at 05:48

## 2025-05-12 RX ADMIN — LEVOTHYROXINE SODIUM 200 MCG: 0.2 TABLET ORAL at 05:49

## 2025-05-12 RX ADMIN — FENOFIBRATE 134 MG: 134 CAPSULE ORAL at 05:49

## 2025-05-12 ASSESSMENT — PAIN DESCRIPTION - PAIN TYPE
TYPE: ACUTE PAIN

## 2025-05-12 NOTE — PROGRESS NOTES
"Report received from NOC RN and assumed patient care at 0700. Patient is A&Ox 4, on RA, and Bed alarm in place . Patient reporting a pain level of 0/10. Call light within reach and bed in lowest position. Reinforced the need to call for assistance. Plan of care discussed and patient does not have any further needs at this time.     BP 98/62   Pulse 70   Temp 37.1 °C (98.7 °F) (Temporal)   Resp 16   Ht 1.981 m (6' 5.99\")   Wt 115 kg (253 lb 4.9 oz)   SpO2 97%   BMI 29.28 kg/m²         "

## 2025-05-12 NOTE — PROGRESS NOTES
DC instructions reviewed w/ pt , verbalized understanding. PIV dc'd, armband removed. Meds to bed delivered. Home via [facility] arranged ride.

## 2025-05-12 NOTE — DISCHARGE PLANNING
Agency/Facility Name: [facility]  Plan or Request: KIRILL left vm for Maryanne, asked for a return call.

## 2025-05-12 NOTE — PROGRESS NOTES
Received report and assumed care of patient at change of shift. Patient is A&Ox4, on RA, and denies pain at this time. Patient assessment completed, bed in lowest position, and call light and personal belongings are within reach. Patient expressed no further needs at this time.

## 2025-05-12 NOTE — DISCHARGE SUMMARY
Discharge Summary    CHIEF COMPLAINT ON ADMISSION  Chief Complaint   Patient presents with    Headache    Nausea    Fatigue    Loss of Appetite       Reason for Admission  EMS     Admission Date  5/2/2025    CODE STATUS  Full Code    HPI & HOSPITAL COURSE  This is a 44 y.o. male admitted 5/2/2025 with PTSD, type 2 diabetes mellitus not controlled, major depressive disorder, insomnia , hypothyroidism status post thyroidectomy, seizure disorder who comes into the hospital with generalized weakness.  On arrival to ED patient noted to have hyponatremia, hyperglycemia, significantly elevated A1c 18.9, significantly elevated TSH 60, free T4 less than 0.11.  Initially was admitted under medical for and was transferred telemetry for evaluation of chest pain.  Subsequent EKG with bradycardia with nonspecific ST changes, negative troponin.  Patient was restarted on his medications with improvement in his blood sugars as well as the symptoms.  Patient was educated on importance of compliance with his medications he verbalized understanding.  His hospitalization was prolonged secondary to unsteady gait which did improve with more therapy.  Patient does note that he has help at home and has a caregiver who comes and helps him with his medications again he was strongly encouraged to be compliant with his medications.  At this time patient is medically clear for discharge home       Therefore, he is discharged in fair and stable condition to home with close outpatient follow-up.    The patient met 2-midnight criteria for an inpatient stay at the time of discharge.    Discharge Date  5/12/2025    FOLLOW UP ITEMS POST DISCHARGE  Compliance with medications   Thyroid function, will need repeat labs to ensure appropriate dosing   Diabetes control    DISCHARGE DIAGNOSES  Principal Problem:    Hyponatremia (POA: Yes)  Active Problems:    Postoperative hypothyroidism (POA: Yes)    Anxiety and depression (POA: Yes)    Anemia (POA: Yes)     PTSD (post-traumatic stress disorder) (POA: Yes)      Overview: IMO load March 2020    Type 2 diabetes mellitus without complication, without long-term current use of insulin (HCC) (POA: Yes)    Class 1 obesity due to excess calories with serious comorbidity and body mass index (BMI) of 32.0 to 32.9 in adult (POA: Yes)    Primary hypertension (POA: Yes)    Seizure disorder (HCC) (POA: Yes)    Hypothyroidism (POA: Yes)    Chest pain (POA: Unknown)    Muscle weakness (generalized) (POA: Unknown)  Resolved Problems:    ACP (advance care planning) (POA: Yes)      FOLLOW UP  No future appointments.  [facility]  [address] [place] [id number] [phone]    Follow up  CALL FOR A HOSPITAL FOLLOW UP APPOINTMENT      MEDICATIONS ON DISCHARGE     Medication List        START taking these medications        Instructions   insulin lispro 100 UNIT/ML Sopn injection PEN  Commonly known as: HumaLOG/AdmeLOG   Inject 6 Units under the skin 3 times a day before meals.  Dose: 6 Units     Lantus SoloStar 100 UNIT/ML Sopn injection  Generic drug: insulin glargine  Replaces: Lantus 100 UNIT/ML Soln   Inject 55 Units under the skin every evening.  Dose: 55 Units            CHANGE how you take these medications        Instructions   levothyroxine 200 MCG Tabs  What changed:   medication strength  how much to take  Commonly known as: Synthroid   Take 1 Tablet by mouth every morning on an empty stomach.  Dose: 200 mcg            CONTINUE taking these medications        Instructions   aspirin 81 MG Chew chewable tablet  Commonly known as: Asa   Chew 81 mg every day.  Dose: 81 mg     atorvastatin 80 MG tablet  Commonly known as: Lipitor   Take 80 mg by mouth every evening.  Dose: 80 mg     busPIRone 5 MG tablet  Commonly known as: Buspar   Take 5 mg by mouth 2 times a day.  Dose: 5 mg     divalproex 500 MG Tbec  Commonly known as: Depakote   Take 500-1,500 mg by mouth 2 times a day. 500 mg in the AM  1500 mg in the  "PM  Dose: 500-1,500 mg     Fenofibrate 134 MG Caps   Take 134 mg by mouth every day.  Dose: 134 mg     Latuda 80 MG Tabs  Generic drug: lurasidone   Take 80 mg by mouth with dinner.  Dose: 80 mg     metformin 1000 MG tablet  Commonly known as: Glucophage   Take 1,000 mg by mouth 2 times a day.  Dose: 1,000 mg     naltrexone 50 MG Tabs  Commonly known as: Depade   Take 50 mg by mouth every day.  Dose: 50 mg     sertraline 100 MG Tabs  Commonly known as: Zoloft   Take 200 mg by mouth every day.  Dose: 200 mg     traZODone 100 MG Tabs  Commonly known as: Desyrel   Take 100 mg by mouth at bedtime as needed for Sleep. Indications: Trouble Sleeping  Dose: 100 mg            STOP taking these medications      insulin aspart 100 UNIT/ML Soln  Commonly known as: NovoLOG     Lantus 100 UNIT/ML Soln  Generic drug: insulin glargine  Replaced by: Lantus SoloStar 100 UNIT/ML Sopn injection              Allergies  Allergies   Allergen Reactions    Geodon [Ziprasidone Hcl] Anaphylaxis     Anaphylaxis per patient    Aripiprazole      \"I became lethargic.\"    Abilify      \"Feeling tired, like I don't even know whats going on around me\"    Fish      Pt reports salmon causes him to be sick to his stomach  Not listed on MAR noted 2/3/2021      Hydroxyzine Itching     Pt will only state \"I feel itchy when I take it       DIET  Orders Placed This Encounter   Procedures    Diet Order Diet: Consistent CHO (Diabetic)     Standing Status:   Standing     Number of Occurrences:   1     Diet::   Consistent CHO (Diabetic) [4]       ACTIVITY  As tolerated.      CONSULTATIONS  NA    PROCEDURES  NA    LABORATORY  Lab Results   Component Value Date    SODIUM 137 05/10/2025    POTASSIUM 4.1 05/10/2025    CHLORIDE 98 05/10/2025    CO2 28 05/10/2025    GLUCOSE 208 (H) 05/10/2025    BUN 20 05/10/2025    CREATININE 1.22 05/10/2025    GLOMRATE 89 05/18/2023        Lab Results   Component Value Date    WBC 5.8 05/08/2025    HEMOGLOBIN 11.9 (L) 05/08/2025    " HEMATOCRIT 36.0 (L) 05/08/2025    PLATELETCT 213 05/08/2025        Total time of the discharge process exceeds 32 minutes.

## 2025-05-12 NOTE — DISCHARGE INSTRUCTIONS
It is imperative that you take your medications as directed   You need to follow up with you primary care doctor in 1-2 weeks to ensure you are doing well   A referral to physical therapy was made, they will call to schedule

## 2025-05-12 NOTE — DISCHARGE PLANNING
BOUCHRA attempted to reach out to Diane, .  The phone number [phone] is not a working phone number.    Phone number for Diane was verified with previous admissions.    BOUCHRA reached out to 2nd contact, Mother and the phone picked up and said, The person you are trying to reach cannot take calls at this time and followed by a busy signal.    BOUCHRA reached out to Chloe, the 'boss of Renetta' per Norm.  Chloe tells me that his noncompliance is ongoing.  He buys 2L bottles of sodas, Icees, does not clean his apartment.  Sometimes his Mom will go over and clean.    Norm will be going [facility] if his behavior does not change as he will be evicted from his apartment.    As well Norm has a  through [facility].    Chloe will let Renetta know that he is discharging home today.    BOUCHRA instructed Norm at bedside to text both Chloe and Renetta when he gets home.  Renetta will go to his home and set up his meds for him.

## 2025-05-12 NOTE — PROGRESS NOTES
Pt discharged from floor to [facility]. Pt was dressed, and toileted. All belongings sent with Pt, no belongings left behind in room.

## 2025-05-12 NOTE — CARE PLAN
The patient is Stable - Low risk of patient condition declining or worsening    Shift Goals  Clinical Goals: Patient will have his blood sugar checked at bedtime and will receive insulin according to his sliding scale.  Patient Goals: Patient will be able to sleep comfortably throughout the night.  Family Goals: JUANCARLOS    Progress made toward(s) clinical / shift goals:  Patient had his blood sugar checked at bedtime and received insulin according to his sliding scale.    Problem: Safety  Goal: Will remain free from injury  Outcome: Progressing  Goal: Will remain free from falls  Outcome: Progressing     Problem: Knowledge Deficit  Goal: Knowledge of disease process/condition, treatment plan, diagnostic tests, and medications will improve  Outcome: Progressing  Goal: Knowledge of the prescribed therapeutic regimen will improve  Outcome: Progressing       Patient is not progressing towards the following goals:

## 2025-05-12 NOTE — DISCHARGE PLANNING
Case Management Discharge Planning    Admission Date: 5/2/2025  GMLOS: 3.4  ALOS: 9    6-Clicks ADL Score: 15  6-Clicks Mobility Score: 22  PT and/or OT Eval ordered: Yes  Post-acute Referrals Ordered: Yes  Post-acute Choice Obtained: NA  Has referral(s) been sent to post-acute provider:  Yes    The only facility that accepted and had a bed was [facility].  Norm has been being rehabbed in place.  Up in chair for meals and amb with FWW in hallway.  [facility] will have no open beds until 05/16/25 and will continue to be rehabbed in place and possibly dc'd home today or 05/13/25.    He has a cane and tells me he would not have enough space to use a FWW.    Renetta is his  through [employer] and I have not been able to contact her.  She transports him to medical appts; grocery store; she manages his medications.  Norm denies assistance need with ADLs.    His home is a ground entry one level apt. He has a tub/shower combo.  Anticipated Discharge Dispo: Discharge Disposition: Discharged to home/self care (01)  Discharge Address: [address] [facility] [address]  Discharge Contact Phone Number: [phone]    DME Needed: No    Action(s) Taken: Updated Provider/Nurse on Discharge Plan    Escalations Completed: None    Medically Clear: No  Not at this time.    Next Steps: Prepare for dc home; notify Renetta through [employer] [phone].  Request outpatient PT.    Barriers to Discharge: Medical clearance    Is the patient up for discharge tomorrow: No       Care Transition Team Assessment    Information Source  Orientation Level: Oriented X4, Oriented to place, Oriented to time, Oriented to situation, Oriented to person  Information Given By: Patient  Who is responsible for making decisions for patient? : Patient    Readmission Evaluation  Is this a readmission?: No    Elopement Risk  Legal Hold: No  Ambulatory or Self Mobile in Wheelchair: Yes  Disoriented: No  Psychiatric Symptoms: None  History of  Wandering: No  Elopement this Admit: No  Vocalizing Wanting to Leave: No  Displays Behaviors, Body Language Wanting to Leave: No-Not at Risk for Elopement  Elopement Risk: Not at Risk for Elopement    Interdisciplinary Discharge Planning  Lives with - Patient's Self Care Capacity: Alone and Able to Care For Self  Patient or legal guardian wants to designate a caregiver: No  Support Systems: Friends / Neighbors  Housing / Facility: 1 Story Apartment / Condo  Prior Services: Other (Comments) (community  helps with rides and medication mgmt)    Discharge Preparedness  What is your plan after discharge?: Home with help (has a /)  What are your discharge supports?: Parent, Other (comment) (/Care Giver)  Prior Functional Level: Ambulatory, Needs Assist with Activities of Daily Living, Needs Assist with Medication Management, Uses Cane  Difficulity with ADLs: Walking  Difficulty with ADLs Comment: cane  Difficulity with IADLs: Driving, Shopping, Managing medication  Difficulity with IADL Comments:     Functional Assesment  Prior Functional Level: Ambulatory, Needs Assist with Activities of Daily Living, Needs Assist with Medication Management, Uses Cane    Finances  Financial Barriers to Discharge: Yes  Average Monthly Income: 928 $  Source of Income: Social Security Disability    Vision / Hearing Impairment  Vision Impairment : Yes  Right Eye Vision: Impaired, Wears Glasses  Left Eye Vision: Blind  Hearing Impairment : No    Advance Directive  Advance Directive?: None  Advance Directive offered?: AD Booklet refused    Domestic Abuse  Have you ever been the victim of abuse or violence?: No  Possible Abuse/Neglect Reported to:: Not Applicable    Psychological Assessment  History of Substance Abuse: None  History of Psychiatric Problems: Yes  Non-compliant with Treatment: No  Newly Diagnosed Illness: No    Discharge Risks or Barriers  Discharge risks or barriers?: Mental health,  Complex medical needs  Patient risk factors: Complex medical needs, Mental health, Noncompliance, Readmission, Vulnerable adult    Anticipated Discharge Information  Discharge Disposition: Discharged to home/self care (01)  Discharge Address: [address] [place] [id number]  Discharge Contact Phone Number: [phone]    ADDENDUM:  1325  CM arranged with MTM a discharge ride home and he will be picked up at 1430.

## 2025-05-12 NOTE — PROGRESS NOTES
Diabetes education: Pt states he has all his medications and supplies at home. Please send text if pt has questions before discharge . Per CM note, pt to dc home.

## 2025-05-16 NOTE — Clinical Note
REFERRAL APPROVAL NOTICE         Sent on May 16, 2025                   Norm Prabhakar  [address]  [facility] [address]                   Dear Mr. Prabhakar,    After a careful review of the medical information and benefit coverage, [facility] has processed your referral. See below for additional details.    If applicable, you must be actively enrolled with your insurance for coverage of the authorized service. If you have any questions regarding your coverage, please contact your insurance directly.    REFERRAL INFORMATION   Referral #:  [id number]  Referred-To Department    Referred-By Provider:  Physical Therapy    Nannette Multani M.D.   [facility]      [address]  [facility] [address]  [phone] [address], [address]  [facility] [address] [phone]    Referral Start Date:  05/12/2025  Referral End Date:   05/12/2026             SCHEDULING  If you do not already have an appointment, please call [phone] to make an appointment.     MORE INFORMATION  If you do not already have a [employer] account, sign up at: [email or web address]  You can access your medical information, make appointments, see lab results, billing information, and more.  If you have questions regarding this referral, please contact  the [facility] Referrals department at:             [phone]. Monday - Friday 8:00AM - 5:00PM.     Sincerely,    [facility]

## 2025-05-20 ENCOUNTER — PATIENT OUTREACH (OUTPATIENT)
Dept: HEALTH INFORMATION MANAGEMENT | Facility: OTHER | Age: 45
End: 2025-05-20
Payer: MEDICAID

## 2025-05-20 NOTE — PROGRESS NOTES
Community Health Worker Akshat attempted to reach the patient after discharge from the hospital to follow up. Patient did not answer and CHW left a detailed voicemail requesting a call back.     Community Health Kaleb Oden will attempt again at a later date.

## 2025-05-30 NOTE — PROGRESS NOTES
CHW Lisbeth will discharge patient from CCM services due to lack of contact after x3 TC attempts 05/30/25 .

## 2025-05-30 NOTE — ED NOTES
Abrasions in left knee and right hand/palm cleaned and applied w/antibiotic ointment, band aid placed, kept clean dry and intact.  Immobilizer applied by KATE Swann in left leg. Cms intact.   done

## 2025-07-20 ENCOUNTER — APPOINTMENT (OUTPATIENT)
Dept: RADIOLOGY | Facility: MEDICAL CENTER | Age: 45
End: 2025-07-20
Attending: EMERGENCY MEDICINE
Payer: MEDICAID

## 2025-07-20 ENCOUNTER — HOSPITAL ENCOUNTER (OUTPATIENT)
Facility: MEDICAL CENTER | Age: 45
End: 2025-07-27
Attending: EMERGENCY MEDICINE | Admitting: INTERNAL MEDICINE
Payer: MEDICAID

## 2025-07-20 PROBLEM — R53.1 GENERALIZED WEAKNESS: Status: ACTIVE | Noted: 2025-07-20

## 2025-07-20 PROBLEM — R53.1 WEAKNESS: Status: RESOLVED | Noted: 2023-06-10 | Resolved: 2025-07-20

## 2025-07-20 PROBLEM — R79.89 PSEUDOHYPONATREMIA: Status: ACTIVE | Noted: 2025-07-20

## 2025-07-20 PROBLEM — F31.9 BIPOLAR DISORDER (HCC): Status: ACTIVE | Noted: 2025-07-20

## 2025-07-20 PROBLEM — E11.65 UNCONTROLLED TYPE 2 DIABETES MELLITUS WITH HYPERGLYCEMIA (HCC): Status: RESOLVED | Noted: 2023-06-09 | Resolved: 2025-07-20

## 2025-07-20 PROBLEM — R29.90 STROKE-LIKE SYMPTOM: Status: ACTIVE | Noted: 2025-07-20

## 2025-07-20 ASSESSMENT — LIFESTYLE VARIABLES
AVERAGE NUMBER OF DAYS PER WEEK YOU HAVE A DRINK CONTAINING ALCOHOL: 1
HAVE YOU EVER FELT YOU SHOULD CUT DOWN ON YOUR DRINKING: NO
TOTAL SCORE: 0
HAVE PEOPLE ANNOYED YOU BY CRITICIZING YOUR DRINKING: NO
TOTAL SCORE: 0
TOTAL SCORE: 0
HOW MANY TIMES IN THE PAST YEAR HAVE YOU HAD 5 OR MORE DRINKS IN A DAY: 0
DOES PATIENT WANT TO STOP DRINKING: NO
ON A TYPICAL DAY WHEN YOU DRINK ALCOHOL HOW MANY DRINKS DO YOU HAVE: 1
ALCOHOL_USE: YES
CONSUMPTION TOTAL: NEGATIVE
EVER FELT BAD OR GUILTY ABOUT YOUR DRINKING: NO
EVER HAD A DRINK FIRST THING IN THE MORNING TO STEADY YOUR NERVES TO GET RID OF A HANGOVER: NO

## 2025-07-20 ASSESSMENT — ENCOUNTER SYMPTOMS
BLURRED VISION: 0
SINUS PAIN: 0
FALLS: 0
FOCAL WEAKNESS: 0
COUGH: 0
DOUBLE VISION: 0
MYALGIAS: 0
VOMITING: 0
DIAPHORESIS: 0
DIARRHEA: 1
WHEEZING: 0
SORE THROAT: 0
PHOTOPHOBIA: 0
HEADACHES: 0
CHILLS: 0
ABDOMINAL PAIN: 0
DEPRESSION: 0
HEARTBURN: 0
FEVER: 0
PALPITATIONS: 0
WEAKNESS: 0
NAUSEA: 1
ORTHOPNEA: 0
CONSTIPATION: 0
SHORTNESS OF BREATH: 0
NERVOUS/ANXIOUS: 0
FLANK PAIN: 0
DIZZINESS: 0
TINGLING: 1

## 2025-07-20 ASSESSMENT — COGNITIVE AND FUNCTIONAL STATUS - GENERAL
STANDING UP FROM CHAIR USING ARMS: A LOT
DAILY ACTIVITIY SCORE: 24
SUGGESTED CMS G CODE MODIFIER DAILY ACTIVITY: CH
MOVING TO AND FROM BED TO CHAIR: A LITTLE
MOVING FROM LYING ON BACK TO SITTING ON SIDE OF FLAT BED: A LITTLE
WALKING IN HOSPITAL ROOM: A LOT
MOBILITY SCORE: 15
CLIMB 3 TO 5 STEPS WITH RAILING: TOTAL
SUGGESTED CMS G CODE MODIFIER MOBILITY: CK

## 2025-07-20 ASSESSMENT — SOCIAL DETERMINANTS OF HEALTH (SDOH)
IN THE PAST 12 MONTHS, HAS THE ELECTRIC, GAS, OIL, OR WATER COMPANY THREATENED TO SHUT OFF SERVICE IN YOUR HOME?: NO
WITHIN THE LAST YEAR, HAVE TO BEEN RAPED OR FORCED TO HAVE ANY KIND OF SEXUAL ACTIVITY BY YOUR PARTNER OR EX-PARTNER?: NO
WITHIN THE PAST 12 MONTHS, YOU WORRIED THAT YOUR FOOD WOULD RUN OUT BEFORE YOU GOT THE MONEY TO BUY MORE: NEVER TRUE
WITHIN THE LAST YEAR, HAVE YOU BEEN HUMILIATED OR EMOTIONALLY ABUSED IN OTHER WAYS BY YOUR PARTNER OR EX-PARTNER?: NO
WITHIN THE PAST 12 MONTHS, THE FOOD YOU BOUGHT JUST DIDN'T LAST AND YOU DIDN'T HAVE MONEY TO GET MORE: NEVER TRUE
WITHIN THE LAST YEAR, HAVE YOU BEEN KICKED, HIT, SLAPPED, OR OTHERWISE PHYSICALLY HURT BY YOUR PARTNER OR EX-PARTNER?: NO
WITHIN THE LAST YEAR, HAVE YOU BEEN AFRAID OF YOUR PARTNER OR EX-PARTNER?: NO

## 2025-07-20 ASSESSMENT — PAIN DESCRIPTION - PAIN TYPE
TYPE: ACUTE PAIN
TYPE: ACUTE PAIN

## 2025-07-20 ASSESSMENT — FIBROSIS 4 INDEX
FIB4 SCORE: 0.93
FIB4 SCORE: 1.05

## 2025-07-20 NOTE — DISCHARGE INSTRUCTIONS
Discharge Instructions per Eleanor Syed M.D.    Mr. Prabhakar,     You were seen in the hospital for nausea/abdominal pain and weakness.  This was likely related to the ozempic you were taking.  We have stopped that medication and your symptoms improved.  Your sugars were low so we decreased your lantus to 32 units twice daily, monitor your sugars closely and follow up with your endocrinologist.  You were found to have low vitamin D levels so started on vitamin D.         Return to ER if you develop chest pain, shortness of breath, nausea/vomiting and concerns for dehydration or any other concerning symptoms.

## 2025-07-20 NOTE — ED TRIAGE NOTES
"Chief Complaint   Patient presents with    Possible Stroke     Pt MAGNO VIZCARRA from home. Per report, pt had sudden onset R sided weakness and R facial droop around 1415 today. Pt self reports hx of TIA in January. Denies blood thinner use. Pt reports he is \"blind\" in his left eye     Pt arrives via EMS as a stroke alert for above.     Hx many visits for same sx. Pt speaking clearly, trace movement of R extremities. No obvious facial droop noted. Hx DM/  per EMS.    Neuro team at bedside upon arrival, pt transported to CT scan, then to red , report handoff given to Vicki ANNE.     NIH 9  "

## 2025-07-20 NOTE — ED PROVIDER NOTES
ED Provider Note    CHIEF COMPLAINT  Stroke alert    EXTERNAL RECORDS REVIEWED  Most recent outpatient note from 2025 patient being managed for type 2 diabetes and postoperative hypothyroidism.  Patient was also admitted here 2 months prior for nausea fatigue headacheHyponatremia.    HPI/ROS  LIMITATION TO HISTORY     OUTSIDE HISTORIAN(S):  EMS at time of arrival    Norm Prabhakar is a 45 y.o. male who presents to the emergency department with chief complaint of altered mental status and right-sided weakness.  Patient called EMS reports that 20 minutes prior to arrival he began having severe right sided weakness and right upper and right lower reports previous TIA previous stroke stated that he had some weakness in his right side previously however much worse today.  No falls no trauma denies any alcohol or drug use today.  Reports he has been compliant with other medications he is not on any anticoagulation.  No fevers chills cough chest pain abdominal pain no problems urination or bowel movements no other acute symptom change or concern at this time.    PAST MEDICAL HISTORY   has a past medical history of Anxiety, ASTHMA, Bipolar 1 disorder ([facility]), Depression, Diabetes (), Fall, Glaucoma, Glaucoma (), Hypothyroidism, Indigestion, Mental disorder, Murmur, Pneumonia, Psychiatric problem (), S/P thyroidectomy, Seizure ([facility]) (), Seizure disorder ([facility]), and Unspecified disorder of thyroid.    SURGICAL HISTORY   has a past surgical history that includes eye surgery; thyroid lobectomy; and other.    FAMILY HISTORY  Family History   Problem Relation Age of Onset    Hypertension Mother     Heart Disease Mother     Lung Disease Mother     Stroke Maternal Grandmother        SOCIAL HISTORY  Social History     Tobacco Use    Smoking status: Former     Current packs/day: 0.00     Types: Cigarettes, Cigars     Quit date: 2020     Years since quittin.3    Smokeless tobacco: Never   Vaping Use     Vaping status: Every Day    Start date: 8/1/2023    Substances: Nicotine, THC, CBD, Flavoring    Devices: Disposable, Pre-filled or refillable cartridge, Pre-filled pod   Substance and Sexual Activity    Alcohol use: Not Currently     Comment: occasionally    Drug use: Not Currently     Types: Inhaled     Comment: marijuana every few days    Sexual activity: Not Currently       CURRENT MEDICATIONS  Home Medications    **Home medications have not yet been reviewed for this encounter**       Audit from Redirected Encounters    **Home medications have not yet been reviewed for this encounter**         ALLERGIES  Allergies[1]    PHYSICAL EXAM  VITAL SIGNS: There were no vitals taken for this visit.     Pulse ox interpretation: I interpret this pulse ox as normal.  Constitutional: Alert and oriented x 3, minimal Distress  HEENT: Atraumatic normocephalic, pupils are equal round reactive to light extraocular movements are intact. The nares is clear, external ears are normal, mouth shows moist mucous membranes normal dentition for age  Neck: Supple, no JVD no tracheal deviation  Cardiovascular: Regular rate and rhythm no murmur rub or gallop 2+ pulses peripherally x4  Thorax & Lungs: No respiratory distress, no wheezes rales or rhonchi, No chest tenderness.   GI: Soft nontender nondistended positive bowel sounds, no peritoneal signs  Skin: Warm dry no acute rash or lesion  Musculoskeletal: No acute deformity  Neurologic: Patient has weakness in right upper and right lower extremity some effort against gravity however braces with these extremities when moving left extremity.  Also reports paresthesia in right upper right lower.  Mild dysarthria and mild right-sided facial droop.      National Institutes of Health (NIH) Stroke Scale   NIH Stroke Scale    Level of Consciousness: Alert, Keenly Responsive  Ask Month and Age: Both Questions Right  Blink Eyes and Squeeze Hands: Performs Both Tasks  Best Gaze: Normal  Visual: No  Visual Loss  Facial Palsy: Minor Paralysis  Motor, Left Arm: No Drift  Motor, Right Arm: Some Effort Against Groom  Motor, Left Leg: No Drift  Motor, Right Leg: Some Effort Against Gravity  Limb Ataxia: Present in Two Limbs  Sensory Loss: Mild-to-Moderate Sensory Loss  Best Language: No Aphasia  Dysarthria: Mild-to-Moderate Dysarthria  Extinction and Inattention: No Abnormality    NIHSS Score: 9    Psychiatric: Appropriate affect for situation at this time      EKG/LABS  Results for orders placed or performed during the hospital encounter of 07/20/25   CBC WITH DIFFERENTIAL    Collection Time: 07/20/25  2:53 PM   Result Value Ref Range    WBC 10.1 4.8 - 10.8 K/uL    RBC 4.75 4.70 - 6.10 M/uL    Hemoglobin 14.5 14.0 - 18.0 g/dL    Hematocrit 42.4 42.0 - 52.0 %    MCV 89.3 81.4 - 97.8 fL    MCH 30.5 27.0 - 33.0 pg    MCHC 34.2 32.3 - 36.5 g/dL    RDW 44.2 35.9 - 50.0 fL    Platelet Count 351 164 - 446 K/uL    MPV 10.8 9.0 - 12.9 fL    Neutrophils-Polys 55.20 44.00 - 72.00 %    Lymphocytes 38.60 22.00 - 41.00 %    Monocytes 4.40 0.00 - 13.40 %    Eosinophils 0.90 0.00 - 6.90 %    Basophils 0.90 0.00 - 1.80 %    Nucleated RBC 0.00 0.00 - 0.20 /100 WBC    Neutrophils (Absolute) 5.58 1.82 - 7.42 K/uL    Lymphs (Absolute) 3.90 1.00 - 4.80 K/uL    Monos (Absolute) 0.40 0.00 - 0.85 K/uL    Eos (Absolute) 0.09 0.00 - 0.51 K/uL    Baso (Absolute) 0.09 0.00 - 0.12 K/uL    NRBC (Absolute) 0.00 K/uL    Anisocytosis 1+     Microcytosis 1+    COMP METABOLIC PANEL    Collection Time: 07/20/25  2:53 PM   Result Value Ref Range    Sodium 132 (L) 135 - 145 mmol/L    Potassium 4.0 3.6 - 5.5 mmol/L    Chloride 97 96 - 112 mmol/L    Co2 19 (L) 20 - 33 mmol/L    Anion Gap 16.0 7.0 - 16.0    Glucose 274 (H) 65 - 99 mg/dL    Bun 19 8 - 22 mg/dL    Creatinine 1.09 0.50 - 1.40 mg/dL    Calcium 9.3 8.5 - 10.5 mg/dL    Correct Calcium 8.9 8.5 - 10.5 mg/dL    AST(SGOT) 24 12 - 45 U/L    ALT(SGPT) 10 2 - 50 U/L    Alkaline Phosphatase 79 30 - 99  U/L    Total Bilirubin 0.8 0.1 - 1.5 mg/dL    Albumin 4.5 3.2 - 4.9 g/dL    Total Protein 7.1 6.0 - 8.2 g/dL    Globulin 2.6 1.9 - 3.5 g/dL    A-G Ratio 1.7 g/dL   PROTHROMBIN TIME    Collection Time: 25  2:53 PM   Result Value Ref Range    PT 12.7 12.0 - 14.6 sec    INR 0.95 0.87 - 1.13   APTT    Collection Time: 25  2:53 PM   Result Value Ref Range    APTT 29.9 24.7 - 36.0 sec   COD (ADULT)    Collection Time: 25  2:53 PM   Result Value Ref Range    ABO Grouping Only A     Rh Grouping Only POS     Antibody Screen-Cod NEG    TROPONIN    Collection Time: 25  2:53 PM   Result Value Ref Range    Troponin T 19 6 - 19 ng/L   ESTIMATED GFR    Collection Time: 25  2:53 PM   Result Value Ref Range    GFR (CKD-EPI) 86 >60 mL/min/1.73 m 2   DIFFERENTIAL MANUAL    Collection Time: 25  2:53 PM   Result Value Ref Range    Manual Diff Status PERFORMED    PERIPHERAL SMEAR REVIEW    Collection Time: 25  2:53 PM   Result Value Ref Range    Peripheral Smear Review see below    PLATELET ESTIMATE    Collection Time: 25  2:53 PM   Result Value Ref Range    Plt Estimation Normal    MORPHOLOGY    Collection Time: 25  2:53 PM   Result Value Ref Range    RBC Morphology Present     Reactive Lymphocytes Few    Hemoglobin A1C    Collection Time: 25  2:53 PM   Result Value Ref Range    Glycohemoglobin 13.7 (H) 4.0 - 5.6 %    Est Avg Glucose 346 mg/dL   EKG (NOW)    Collection Time: 25  7:54 PM   Result Value Ref Range    Report       [facility]    Test Date:  2025  Pt Name:    HOLLY KIM                 Department: ER  MRN:        [id number]                      Room:       T207  Gender:     Male                         Technician: INGRIS  :        1980                   Requested By:FADI PRATER  Order #:    [id number]                    Reading MD:    Measurements  Intervals                                Axis  Rate:       58                            P:          51  NY:         178                          QRS:        41  QRSD:       127                          T:          40  QT:         422  QTc:        415    Interpretive Statements  Sinus bradycardia  IVCD, consider atypical LBBB  Compared to ECG 05/06/2025 14:06:31  Sinus rhythm no longer present  Myocardial infarct finding no longer present  ST (T wave) deviation no longer present     POCT glucose device results    Collection Time: 07/20/25  8:19 PM   Result Value Ref Range    POC Glucose, Blood 267 (H) 65 - 99 mg/dL       I have independently interpreted this EKG    RADIOLOGY/PROCEDURES   I have independently interpreted the diagnostic imaging associated with this visit and am waiting the final reading from the radiologist.   My preliminary interpretation is as follows: No acute intracranial abnormality    Radiologist interpretation:  DX-CHEST-PORTABLE (1 VIEW)   Final Result      No evidence of acute cardiopulmonary process.      CT-CTA NECK WITH & W/O-POST PROCESSING   Final Result      1.  No evidence of carotid or vertebral arterial occlusion or dissection.      2.  Again seen multiple heterogeneous enhancing neck masses anteriorly the largest of which measures 3.7 x 3.0 cm in size. These are unchanged from comparison.      CT-CTA HEAD WITH & W/O-POST PROCESS   Final Result      CT angiogram of the [facility] within normal limits.      CT-CEREBRAL PERFUSION ANALYSIS   Final Result      1. Cerebral blood flow less than 30% possibly representing completed infarct = 1 mL. Based on distribution of this finding, this is likely to represent artifact.      2. T Max more than 6 seconds possibly representing combination of completed infarct and ischemia = 16 mL. Based on the distribution of this finding, this is likely to represent artifact.      3. Mismatched volume possibly representing ischemic brain/penumbra= 15 mL      4.  Please note that this cerebral perfusion study and report is Quantitative  "and targets supratentorial (cerebral) perfusion for evaluation of large vessel territory acute ischemia/infarction. For example, lacunar infarcts, and brainstem/posterior fossa    ischemia/infarction are not evaluated on this study.  Data acquisition is subject to artifacts which can yield non-anatomically plausible perfusion maps which may be due to motion, bolus timing, signal to noise ratio, or other technical factors.    Perfusion map abnormalities which show non-anatomic distributions are likely artifact.   This study is not \"stand-alone\" and should only be utilized for diagnosis, management/treatment in correlation with CT, CTA, and/or MRI and clinical factors.         CT-HEAD W/O   Final Result      1.  No evidence of acute intracranial process.      2.  Again seen prominent diffuse white matter disease.               MR-BRAIN-W/O    (Results Pending)       COURSE & MEDICAL DECISION MAKING      ASSESSMENT, COURSE AND PLAN    Care Narrative: 45-year-old male presents with right-sided weakness and slight dysarthria.  Initial CTs are negative at this time not a candidate for tenecteplase due to improving symptoms and multiple previous similar events.  Slight hyperglycemia on labs.  Patient has persistent weakness in the right upper and right lower which is preventing him from walking we have tried to ambulate the patient several times however he has persistent weakness in the right lower and normally ambulates with a cane.  Patient does want to go home however at this time not a safe discharge.  My hope is that his symptoms will continue to improve and that he will not require further evaluation but should his symptoms not improve may require further intervention/evaluation.  I discussed this with MARIA DE JESUS Dela Cruz from the CDU and patient be admitted to the CDU for ongoing evaluation.  Admitted in guarded condition.      FINAL DIAGNOSIS  1. Weakness Active   2.  Right upper and right lower extremity " "weakness  3.  TIA versus conversion disorder     Electronically signed by: Dick Keith M.D.           [1]   Allergies  Allergen Reactions    Geodon [Ziprasidone Hcl] Anaphylaxis     Anaphylaxis per patient    Aripiprazole      \"I became lethargic.\"    Abilify      \"Feeling tired, like I don't even know whats going on around me\"    Fish      Pt reports salmon causes him to be sick to his stomach  Not listed on MAR noted 2/3/2021      Hydroxyzine Itching     Pt will only state \"I feel itchy when I take it     "

## 2025-07-20 NOTE — CONSULTS
"Neurology STROKE H&P  Neurohospitalist Service, [facility]    Referring Physician: Dick Keith M.D.    STROKE:   Chief Complaint   Patient presents with    Possible Stroke     Pt MAGNO VIZCARRA from home. Per report, pt had sudden onset R sided weakness and R facial droop around 1415 today. Pt self reports hx of TIA in January. Denies blood thinner use. Pt reports he is \"blind\" in his left eye       To obtain the most accurate data regarding the time called, and time patient seen, refer to the stroke run-sheet and chart.  For time of CT, refer to the radiology report. See A&P below for TPA Decision and door to needle time if and when applicable.    HPI: Norm Prabhakar is a pleasant 45 y.o. right-handed male with multiple medical problems as outlined below and in particular history of underlying psychiatric issues with bipolar disorder, anxiety, depression and seizure disorder who presented with acute onset of right-sided weakness with the onset around 1415 today.  Patient has had numerous similar presentations in the past with negative stroke imaging including CTAs and MRI of the head.  Again he underwent CT, CTA and CT perfusion which were all unremarkable.  There is significant inconsistency on his exam.  His blood glucose level in the field was 247.    Review of systems: In addition to what is detailed in the HPI above, all other systems reviewed and are negative.    Past Medical History:    has a past medical history of Anxiety, ASTHMA, Bipolar 1 disorder (HCC), Depression, Diabetes (HCC), Fall, Glaucoma, Glaucoma (1982), Hypothyroidism, Indigestion, Mental disorder, Murmur, Pneumonia, Psychiatric problem (2002), S/P thyroidectomy, Seizure (HCC) (2010), Seizure disorder ([facility]), and Unspecified disorder of thyroid.    FHx:  family history includes Heart Disease in his mother; Hypertension in his mother; Lung Disease in his mother; Stroke in his maternal grandmother.    SHx:   reports " "that he quit smoking about 5 years ago. His smoking use included cigarettes and cigars. He has never used smokeless tobacco. He reports that he does not currently use alcohol. He reports that he does not currently use drugs after having used the following drugs: Inhaled.    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  CV: RRR  Lungs: No respiratory distress  Extremities: No cyanosis, warm, no significant edema.    NEUROLOGICAL EXAM:   Mental status: Awake, alert and fully oriented, follows commands  Speech and language: speech is slightly dysarthric but comprehensible. The patient is able to name and repeat.  Cranial nerve exam: Pupils are equal, round and reactive to light bilaterally. Visual fields are full. Extraocular muscles are intact. Sensation in the face is intact to light touch. Face is symmetric. Hearing to finger rub equal. Palate elevates symmetrically. Shoulder shrug is full. Tongue is midline.  Motor exam: Sustain antigravity in left upper and lower extremities with no downward drift.  He has no effort against gravity in the right upper and lower extremity with inconsistency on exam with no effort.  Tone is normal. No abnormal movements were seen on exam.  Sensory exam: Decreased sensation in right upper and lower extremity  Coordination: no gross ataxia noted on exam  Plantar reflexes: Equivocal  Gait: deferred    NIH Stroke Scale:    1a. Level of Consciousness (Alert, drowsy, etc): 0= Alert    1b. LOC Questions (Month, age): 0= Answers both correctly    1c. LOC Commands (Open/close eyes make fist/let go): 0= " Obeys both correctly    2.   Best Gaze (Eyes open - patient follows examiner's finger on face): 0= Normal    3.   Visual Fields (introduce visual stimulus/threat to patient's field quadrants): 1= Partial Hemiania  4.   Facial Paresis (Show teeth, raise eyebrows and squeeze eyes shut): 0= Normal     5a. Motor Arm - Left (Elevate arm to 90 degrees if patient is sitting, 45 degrees if  supine): 0= No drift    5b. Motor Arm - Right (Elevate arm to 90 degrees if patient is sitting, 45 degrees if supine): 3= No effort against gravity    6a. Motor Leg - Left (Elevate leg 30 degrees with patient supine): 0= No drift    6b. Motor Leg - Right  (Elevate leg 30 degrees with patient supine): 3= No effort against gravity    7.   Limb Ataxia (Finger-nose, heel down shin): 0= No ataxia    8.   Sensory (Pin prick to face, arm, trunk and leg - compare side to side): 1= Partial loss    9.  Best Language (Name item, describe a picture and read sentences): 0= No aphasia    10. Dysarthria (Evaluate speech clarity by patient repeating listed words): 1= Mild to moderate slurring    11. Extinction and Inattention (Use information from prior testing to identify neglect or  double simultaneous stimuli testing): 0= No neglect    Total NIH Score: 9    Baseline modified Blackford Scale (MRS): 2 = Slight disability; unable to perform all previous activities but able to look after own affairs without assistance    Objective Data:    Labs:  Lab Results   Component Value Date/Time    PROTHROMBTM 12.7 07/20/2025 02:53 PM    INR 0.95 07/20/2025 02:53 PM      Lab Results   Component Value Date/Time    WBC 5.8 05/08/2025 04:37 AM    RBC 3.94 (L) 05/08/2025 04:37 AM    HEMOGLOBIN 11.9 (L) 05/08/2025 04:37 AM    HEMATOCRIT 36.0 (L) 05/08/2025 04:37 AM    MCV 91.4 05/08/2025 04:37 AM    MCH 30.2 05/08/2025 04:37 AM    MCHC 33.1 05/08/2025 04:37 AM    MPV 11.0 05/08/2025 04:37 AM    NEUTSPOLYS 59.90 05/05/2025 07:54 AM    LYMPHOCYTES 32.10 05/05/2025 07:54 AM     MONOCYTES 5.80 05/05/2025 07:54 AM    EOSINOPHILS 1.10 05/05/2025 07:54 AM    BASOPHILS 0.40 05/05/2025 07:54 AM    ANISOCYTOSIS 1+ 08/13/2024 06:34 AM      Lab Results   Component Value Date/Time    SODIUM 128 (L) 06/23/2025 11:34 AM    SODIUM 137 05/10/2025 12:18 AM    POTASSIUM 4.6 06/23/2025 11:34 AM    POTASSIUM 4.1 05/10/2025 12:18 AM    CHLORIDE 93 (L) 06/23/2025 11:34 AM    CHLORIDE 98 05/10/2025 12:18 AM    CO2 17 (L) 06/23/2025 11:34 AM    CO2 28 05/10/2025 12:18 AM    GLUCOSE 439 (H) 06/23/2025 11:34 AM    GLUCOSE 208 (H) 05/10/2025 12:18 AM    BUN 21 06/23/2025 11:34 AM    BUN 20 05/10/2025 12:18 AM    CREATININE 0.95 06/23/2025 11:34 AM    CREATININE 1.22 05/10/2025 12:18 AM    BUNCREATRAT 22 (H) 06/23/2025 11:34 AM    GLOMRATE 89 05/18/2023 06:44 PM      Lab Results   Component Value Date/Time    CHOLSTRLTOT 296 (H) 08/06/2024 12:36 AM    LDL see below 08/06/2024 12:36 AM    HDL 31 (A) 08/06/2024 12:36 AM    TRIGLYCERIDE 881 (H) 08/06/2024 12:36 AM       Lab Results   Component Value Date/Time    ALKPHOSPHAT 86 06/23/2025 11:34 AM    ALKPHOSPHAT 84 05/05/2025 07:54 AM    ASTSGOT 18 06/23/2025 11:34 AM    ASTSGOT 22 05/05/2025 07:54 AM    ALTSGPT 12 06/23/2025 11:34 AM    ALTSGPT 12 05/05/2025 07:54 AM    TBILIRUBIN 0.6 06/23/2025 11:34 AM    TBILIRUBIN 0.4 05/05/2025 07:54 AM      Lab Results   Component Value Date/Time    HBA1C 13.2 06/30/2025 02:06 PM    HBA1C 18.9 (H) 05/02/2025 01:46 PM       Imaging/Testing:    I interpreted and/or reviewed the patient's neuroimaging    DX-CHEST-PORTABLE (1 VIEW)   Final Result      No evidence of acute cardiopulmonary process.      CT-CTA NECK WITH & W/O-POST PROCESSING   Final Result      1.  No evidence of carotid or vertebral arterial occlusion or dissection.      2.  Again seen multiple heterogeneous enhancing neck masses anteriorly the largest of which measures 3.7 x 3.0 cm in size. These are unchanged from comparison.      CT-CTA HEAD WITH & W/O-POST  "PROCESS   Final Result      CT angiogram of the [place] within normal limits.      CT-CEREBRAL PERFUSION ANALYSIS   Final Result      1. Cerebral blood flow less than 30% possibly representing completed infarct = 1 mL. Based on distribution of this finding, this is likely to represent artifact.      2. T Max more than 6 seconds possibly representing combination of completed infarct and ischemia = 16 mL. Based on the distribution of this finding, this is likely to represent artifact.      3. Mismatched volume possibly representing ischemic brain/penumbra= 15 mL      4.  Please note that this cerebral perfusion study and report is Quantitative and targets supratentorial (cerebral) perfusion for evaluation of large vessel territory acute ischemia/infarction. For example, lacunar infarcts, and brainstem/posterior fossa    ischemia/infarction are not evaluated on this study.  Data acquisition is subject to artifacts which can yield non-anatomically plausible perfusion maps which may be due to motion, bolus timing, signal to noise ratio, or other technical factors.    Perfusion map abnormalities which show non-anatomic distributions are likely artifact.   This study is not \"stand-alone\" and should only be utilized for diagnosis, management/treatment in correlation with CT, CTA, and/or MRI and clinical factors.         CT-HEAD W/O   Final Result      1.  No evidence of acute intracranial process.      2.  Again seen prominent diffuse white matter disease.                   Assessment:  Norm Prabhakar is a 45 y.o. right-handed male with multiple medical problems as outlined below and in particular history of underlying psychiatric issues with bipolar disorder, anxiety, depression and seizure disorder who presented with acute onset of right-sided weakness with the onset around 1415 today.  Patient has had numerous similar presentations in the past with negative stroke imaging including CTAs and MRI of the head.  " "Again he underwent CT, CTA and CT perfusion which were all unremarkable.  There is significant inconsistency on his exam.  His blood glucose level in the field was 247.  Given history of numerous similar presentations in the past and also inconsistency on exam, very low suspicion for acute stroke.  High suspicion for functional neurological disorder or conversion disorder.  I do not recommend administration of TNK, although I discussed pros and cons of administration of TNK with the patient and given usually his symptom resolved with time and after presenting the risk associated with administration of TNK patient agreed not to receive TNK.  Recommend treating his hyperglycemia and if his stable, he is cleared from neurological standpoint for discharge.  No additional neurological workup is indicated at this time.     The plan of care above has been discussed with Dick Keith M.D.      Please note that this dictation was created using voice recognition software. I have made every reasonable attempt to correct obvious errors, but I expect that there are errors of grammar and possibly content that I did not discover before finalizing the note.       Umer Ayala MD  Acute Care Neurology Services          [1]   Allergies  Allergen Reactions    Geodon [Ziprasidone Hcl] Anaphylaxis     Anaphylaxis per patient    Aripiprazole      \"I became lethargic.\"    Abilify      \"Feeling tired, like I don't even know whats going on around me\"    Fish      Pt reports salmon causes him to be sick to his stomach  Not listed on MAR noted 2/3/2021      Hydroxyzine Itching     Pt will only state \"I feel itchy when I take it   [2] No current facility-administered medications for this encounter.    Current Outpatient Medications:     insulin glargine (LANTUS SOLOSTAR) 100 UNIT/ML Solution Pen-injector injection, Inject 55 Units under the skin every evening., Disp: 15 mL, Rfl: 3    levothyroxine (SYNTHROID) 200 MCG Tab, Take 1 " Tablet by mouth every morning on an empty stomach., Disp: 30 Tablet, Rfl: 3    insulin lispro 100 UNIT/ML SC SOPN injection PEN, Inject 6 Units under the skin 3 times a day before meals., Disp: 15 mL, Rfl: 3    divalproex (DEPAKOTE) 500 MG Tablet Delayed Response, Take 500-1,500 mg by mouth 2 times a day. 500 mg in the AM 1500 mg in the PM, Disp: , Rfl:     busPIRone (BUSPAR) 5 MG tablet, Take 5 mg by mouth 2 times a day., Disp: , Rfl:     naltrexone (DEPADE) 50 MG Tab, Take 50 mg by mouth every day., Disp: , Rfl:     aspirin (ASA) 81 MG Chew Tab chewable tablet, Chew 81 mg every day., Disp: , Rfl:     metformin (GLUCOPHAGE) 1000 MG tablet, Take 1,000 mg by mouth 2 times a day., Disp: , Rfl:     atorvastatin (LIPITOR) 80 MG tablet, Take 80 mg by mouth every evening., Disp: , Rfl:     Fenofibrate 134 MG Cap, Take 134 mg by mouth every day., Disp: , Rfl:     traZODone (DESYREL) 100 MG Tab, Take 100 mg by mouth at bedtime as needed for Sleep. Indications: Trouble Sleeping, Disp: , Rfl:     lurasidone (LATUDA) 80 MG Tab, Take 80 mg by mouth with dinner., Disp: , Rfl:     sertraline (ZOLOFT) 100 MG Tab, Take 200 mg by mouth every day., Disp: , Rfl:

## 2025-07-21 PROBLEM — R29.90 STROKE-LIKE SYMPTOM: Status: RESOLVED | Noted: 2025-07-20 | Resolved: 2025-07-21

## 2025-07-21 ASSESSMENT — GAIT ASSESSMENTS
DISTANCE (FEET): 2
GAIT LEVEL OF ASSIST: MINIMAL ASSIST
DEVIATION: BRADYKINETIC;SHUFFLED GAIT
ASSISTIVE DEVICE: FRONT WHEEL WALKER

## 2025-07-21 ASSESSMENT — ENCOUNTER SYMPTOMS
WEIGHT LOSS: 1
SENSORY CHANGE: 1
CHILLS: 0
EYES NEGATIVE: 1
FEVER: 0
NAUSEA: 1
MYALGIAS: 1
CARDIOVASCULAR NEGATIVE: 1
RESPIRATORY NEGATIVE: 1
WEAKNESS: 1

## 2025-07-21 ASSESSMENT — PAIN DESCRIPTION - PAIN TYPE
TYPE: ACUTE PAIN

## 2025-07-21 ASSESSMENT — COGNITIVE AND FUNCTIONAL STATUS - GENERAL
STANDING UP FROM CHAIR USING ARMS: A LOT
CLIMB 3 TO 5 STEPS WITH RAILING: TOTAL
WALKING IN HOSPITAL ROOM: A LOT
MOBILITY SCORE: 13
MOVING TO AND FROM BED TO CHAIR: A LOT
SUGGESTED CMS G CODE MODIFIER MOBILITY: CL
TURNING FROM BACK TO SIDE WHILE IN FLAT BAD: A LITTLE
MOVING FROM LYING ON BACK TO SITTING ON SIDE OF FLAT BED: A LITTLE

## 2025-07-21 NOTE — CARE PLAN
The patient is Watcher - Medium risk of patient condition declining or worsening    Shift Goals  Clinical Goals: IV fluids, glucose control, PT/OT eval, symptom mgmt  Patient Goals: rest, feel better  Family Goals: kristen    Progress made toward(s) clinical / shift goals:  IV fluids infusing. Insulin given per MAR with ACHS FSBG. Pt declines need for further interventions for pain at this time. Pending PT/OT eval in AM 7/21.    Problem: Pain - Standard  Goal: Alleviation of pain or a reduction in pain to the patient’s comfort goal  Description: Target End Date:  Prior to discharge or change in level of care    Document on Vitals flowsheet    1.  Document pain using the appropriate pain scale per order or unit policy  2.  Educate and implement non-pharmacologic comfort measures (i.e. relaxation, distraction, massage, cold/heat therapy, etc.)  3.  Pain management medications as ordered  4.  Reassess pain after pain med administration per policy  5.  If opiods administered assess patient's response to pain medication is appropriate per POSS sedation scale  6.  Follow pain management plan developed in collaboration with patient and interdisciplinary team (including palliative care or pain specialists if applicable)  Outcome: Progressing, pt c/o 7/10 h/a but declines interventions at this time. Requests to rest; provided food per pt request.    Problem: Acute Care of the Diabetic Patient  Goal: Optimal Outcome for the Diabetic Patient  Outcome: Progressing, FSBG obtained. Insulin lispro and lantus given per MAR. Education provided regarding insulin use and checking blood sugar before meals.      Problem: Mobility - Stroke  Goal: Patient's capacity to carry out activities will improve  Description: Target End Date:  Prior to discharge or change in level of care    1.  Assess for barriers to mobility/activity  2.  Implement activity per interdisciplinary team recommendations  3.  Target activity level identified and  patient/family/caregiver aware of goal  4.  Provide assistive devices  5.  Instruct patient/caregiver on proper use of assistive/adaptive devices  6.  Schedule activities and rest periods to decrease effects of fatigue  7.  Encourage mobilization to extent of ability  8.  Maintain proper body alignment  9.  Provide adequate pain management to allow progressive mobilization  10. Implement pace maker precautions as needed  Outcome: Progressing, pt reports new onset right sided weakness. Provided with cane per traction control, but is non-ambulatory at this time. Pt attempting to move RUE/RLE independently. PT/OT orders in place for AM 7/21.     Problem: Knowledge Deficit - Standard  Goal: Patient and family/care givers will demonstrate understanding of plan of care, disease process/condition, diagnostic tests and medications  Description: Target End Date:  1-3 days or as soon as patient condition allows    Document in Patient Education    1.  Patient and family/caregiver oriented to unit, equipment, visitation policy and means for communicating concern  2.  Complete/review Learning Assessment  3.  Assess knowledge level of disease process/condition, treatment plan, diagnostic tests and medications  4.  Explain disease process/condition, treatment plan, diagnostic tests and medications  Outcome: Progressing, discussed POC with pt; agreeable and verbalized understanding.      Problem: Skin Integrity  Goal: Skin integrity is maintained or improved  Description: Target End Date:  Prior to discharge or change in level of care    Document interventions on Skin Risk/Demar flowsheet groups and corresponding LDA    1.  Assess and monitor skin integrity, appearance and/or temperature  2.  Assess risk factors for impaired skin integrity and/or pressures ulcers  3.  Implement precautions to protect skin integrity in collaboration with interdisciplinary team  4.  Implement pressure ulcer prevention protocol if at risk for skin  breakdown  5.  Confirm wound care consult if at risk for skin breakdown  6.  Ensure patient use of pressure relieving devices  (Low air loss bed, waffle overlay, heel protectors, ROHO cushion, etc)  Outcome: Progressing, pt presents with reddened scabs in various healing stages to LLE. No additional skin concerns at this time. Photos obtained for chart.      Problem: Fall Risk  Goal: Patient will remain free from falls  Description: Target End Date:  Prior to discharge or change in level of care    Document interventions on the [employer] Fall Risk Assessment    1.  Assess for fall risk factors  2.  Implement fall precautions  Outcome: Progressing, pt scores as high fall risk per [employer]. Appropriate precautions in place.

## 2025-07-21 NOTE — PROGRESS NOTES
PT gave patient a gown to wear since he deiced he didn't want to wear his shorts and t-shirt anymore.  I rounded on the patient, and he is wearing the gown.  Pt was able to put on the gown by himself.   Patient information on fall and injury prevention

## 2025-07-21 NOTE — HOSPITAL COURSE
Mr. Norm Prabhakar is a 45 y.o. male with a pmh of DM type 1, seizure DO, conversion DO, PTSD, bipolar DO, hypothyroidism, s/p thyroidectomy, poor medication compliance who presented to the ED on 7/20/2025 with complaints of facial droop and weakness.     On examination, the patient tells me that he has been having very poor appetite since being started on it was epic three weeks ago. He is getting weaker, more fatigued and having difficulty walking due to his poor nutritional intake. This morning, when he took his medications, and ended up vomiting them up. He is also been having some diarrhea since starting was epic. Patient says his right sided weakness is worse than normal, but feels this is exacerbated his increasing fatigue and general weakness. I spoke with patient's  who tells me that she is the one that asked him to call 911 when he told her he was having some tingling in his arm. Patient denies any current numbness or tingling, difficulty with speech, difficulty with swallowing. He denies any other focal weakness, dizziness, lightheadedness. He denies  complaints.     Patient says he himself, takes the bus around town, is normal able to take care of himself.  Patient was evaluated by neurology while in the ED who feels patient symptoms are not CVA related, and recommends no additional neurological workup. Patient was hopeful to discharge from the ED; however, he reported ongoing bilateral lower extremity weakness. He felt using a cane may be helpful, so was provided one. Fortunately, patient was unable to ambulate well enough to be discharged home.     CBC unremarkable. CMP with glucose 274, sodium 132. Otherwise unremarkable. Troponin normal. A1c 13.7, which is improved from prior. CT head, CT and CT perfusion are all unremarkable.  Patient admitted to hospital medicine for management of care.    On assessment, patient is back to his baseline, however, when evaluated by PT/OT, patient will  likely need placement to rehab.  Patient's symptoms inconsistent with any neurological disorder however, patient does have a pretty significant history of conversion disorder.  While being evaluated, patient was unable to do assessment tasks however, when patient given a gown to change into, he was able to place himself in again with no difficulty, no falls, and no neurological deficits or weakness noted.

## 2025-07-21 NOTE — PROGRESS NOTES
Received report from CDU RN at 1030 and assumed care of patient at 1115 Patient is A&Ox4, on room air, and reports 6/10 pain at this time. Patient declines pain interventions. Patient assessment completed, bed in lowest position, and call light and personal belongings are within reach. Patient expressed no further needs at this time.

## 2025-07-21 NOTE — ED NOTES
Break RN: Pt reports he needs a cane for discharge, Dr Keith aware. Cane ordered. Traction called. Pt reports he is taking the bus home and does not want a ride from [facility]

## 2025-07-21 NOTE — ASSESSMENT & PLAN NOTE
Secondary to poor oral intake the last couple weeks since starting ozempic about 3 weeks ago  - PT/OT eval and treat  - encourage oral intake  - IV fluids  - encourage discussion of ozempic with his op providers   - UA  - Unlikely neurological based

## 2025-07-21 NOTE — H&P
"Hospital Medicine History & Physical Note    Date of Service  7/20/2025    Primary Care Physician  DANY Mike.    Consultants  neurology    Specialist Names: MD Jamie    Code Status  Full Code    Chief Complaint  Chief Complaint   Patient presents with    Possible Stroke     Pt MAGNO VIZCARRA from home. Per report, pt had sudden onset R sided weakness and R facial droop around 1415 today. Pt self reports hx of TIA in January. Denies blood thinner use. Pt reports he is \"blind\" in his left eye       History of Presenting Illness  Norm Prabhakar is a 45 y.o. male with a pmh of DM type 1, seizure DO, conversion DO, PTSD, bipolar DO, hypothyroidism, s/p thyroidectomy, poor medication compliance who presented to the ED on 7/20/2025 with complaints of facial droop and weakness. On my examination, the patient tells me that he has been having very poor appetite since being started on it was epic three weeks ago. He is getting weaker, more fatigued and having difficulty walking due to his poor nutritional intake. This morning, when he took his medications, and ended up vomiting them up. He is also been having some diarrhea since starting was epic. Patient says his right sided weakness is worse than normal, but feels this is exacerbated his increasing fatigue and general weakness. I spoke with patient's  who tells me that she is the one that asked him to call 911 when he told her he was having some tingling in his arm. Patient denies any current numbness or tingling, difficulty with speech, difficulty with swallowing. He denies any other focal weakness, dizziness, lightheadedness. He denies  complaints.   Patient says he himself, takes the bus around town, is normal able to take care of himself.   Patient was evaluated by neurology while in the ED who feels patient symptoms are not CVA related, and recommends no additional neurological workup. Patient was hopeful to discharge from the ED; however, he " reported ongoing bilateral lower extremity weakness. He felt using a cane may be helpful, so was provided one. Fortunately, patient was unable to ambulate well enough to be discharged home.    CBC unremarkable. CMP with glucose 274, sodium 132. Otherwise unremarkable. Troponin normal. A1c 13.7, which is improved from prior. CT head, CT and CT perfusion are all unremarkable.    I discussed the plan of care with patient and ERP.    Review of Systems  Review of Systems   Constitutional:  Positive for malaise/fatigue. Negative for chills, diaphoresis and fever.        Poor oral intake   HENT:  Negative for congestion, sinus pain and sore throat.    Eyes:  Negative for blurred vision, double vision and photophobia.   Respiratory:  Negative for cough, shortness of breath and wheezing.    Cardiovascular:  Negative for chest pain, palpitations, orthopnea and leg swelling.   Gastrointestinal:  Positive for diarrhea and nausea. Negative for abdominal pain, constipation, heartburn and vomiting.   Genitourinary:  Negative for dysuria, flank pain, frequency, hematuria and urgency.   Musculoskeletal:  Negative for falls and myalgias.        Bilateral lower extremity weakness   Skin:  Negative for itching and rash.   Neurological:  Positive for tingling (right arm). Negative for dizziness, focal weakness, weakness and headaches.   Psychiatric/Behavioral:  Negative for depression. The patient is not nervous/anxious.        Past Medical History   has a past medical history of Anxiety, ASTHMA, Bipolar 1 disorder ([facility]), Depression, Diabetes ([facility]), Fall, Glaucoma, Glaucoma (1982), Hypothyroidism, Indigestion, Mental disorder, Murmur, Pneumonia, Psychiatric problem (2002), S/P thyroidectomy, Seizure ([facility]) (2010), Seizure disorder ([facility]), and Unspecified disorder of thyroid.    Surgical History   has a past surgical history that includes eye surgery; thyroid lobectomy; and other.     Family History  Family History   Problem Relation Age  of Onset    Hypertension Mother     Heart Disease Mother     Lung Disease Mother     Stroke Maternal Grandmother         Family history reviewed with patient. There is family history that is pertinent to the chief complaint.     Social History   reports that he quit smoking about 5 years ago. His smoking use included cigarettes and cigars. He has never used smokeless tobacco. He reports that he does not currently use alcohol. He reports that he does not currently use drugs after having used the following drugs: Inhaled.    Allergies  Allergies[1]    Medications  Prior to Admission Medications   Prescriptions Last Dose Informant Patient Reported? Taking?   Fenofibrate 134 MG Cap  Patient's Home Pharmacy Yes No   Sig: Take 134 mg by mouth every day.   aspirin (ASA) 81 MG Chew Tab chewable tablet  Patient's Home Pharmacy Yes No   Sig: Chew 81 mg every day.   atorvastatin (LIPITOR) 80 MG tablet  Patient's Home Pharmacy Yes No   Sig: Take 80 mg by mouth every evening.   busPIRone (BUSPAR) 5 MG tablet  Patient's Home Pharmacy Yes No   Sig: Take 5 mg by mouth 2 times a day.   divalproex (DEPAKOTE) 500 MG Tablet Delayed Response  Patient's Home Pharmacy Yes No   Sig: Take 500-1,500 mg by mouth 2 times a day. 500 mg in the AM  1500 mg in the PM   insulin glargine (LANTUS SOLOSTAR) 100 UNIT/ML Solution Pen-injector injection   No No   Sig: Inject 55 Units under the skin every evening.   insulin lispro 100 UNIT/ML SC SOPN injection PEN   No No   Sig: Inject 6 Units under the skin 3 times a day before meals.   levothyroxine (SYNTHROID) 200 MCG Tab   No No   Sig: Take 1 Tablet by mouth every morning on an empty stomach.   lurasidone (LATUDA) 80 MG Tab  Patient's Home Pharmacy Yes No   Sig: Take 80 mg by mouth with dinner.   metformin (GLUCOPHAGE) 1000 MG tablet  Patient's Home Pharmacy Yes No   Sig: Take 1,000 mg by mouth 2 times a day.   naltrexone (DEPADE) 50 MG Tab  Patient's Home Pharmacy Yes No   Sig: Take 50 mg by mouth  every day.   sertraline (ZOLOFT) 100 MG Tab  Patient's Home Pharmacy Yes No   Sig: Take 200 mg by mouth every day.   traZODone (DESYREL) 100 MG Tab  Patient's Home Pharmacy Yes No   Sig: Take 100 mg by mouth at bedtime as needed for Sleep. Indications: Trouble Sleeping      Facility-Administered Medications: None       Physical Exam  Temp:  [36.7 °C (98.1 °F)] 36.7 °C (98.1 °F)  Pulse:  [66-74] 66  Resp:  [14-15] 15  BP: (113-119)/(73-83) 113/83  SpO2:  [91 %-94 %] 94 %  Blood Pressure: 119/78   Temperature: 36.7 °C (98.1 °F)   Pulse: 70   Respiration: 14   Pulse Oximetry: 91 %       Physical Exam  Vitals and nursing note reviewed.   Constitutional:       General: He is not in acute distress.     Appearance: He is obese. He is not ill-appearing (chronically ill appearing) or diaphoretic.   HENT:      Head: Normocephalic and atraumatic.      Nose: Nose normal. No congestion or rhinorrhea.      Mouth/Throat:      Mouth: Mucous membranes are moist.      Comments: Poor dentition  Eyes:      Extraocular Movements: Extraocular movements intact.      Pupils: Pupils are equal, round, and reactive to light.      Comments: Glaucoma   Cardiovascular:      Rate and Rhythm: Normal rate and regular rhythm.      Pulses: Normal pulses.      Heart sounds: Normal heart sounds.   Pulmonary:      Effort: Pulmonary effort is normal. No respiratory distress.      Breath sounds: Normal breath sounds. No wheezing.   Abdominal:      General: Bowel sounds are normal. There is no distension.      Palpations: Abdomen is soft.      Tenderness: There is no abdominal tenderness.   Musculoskeletal:         General: Normal range of motion.      Cervical back: Normal range of motion.      Right lower leg: No edema.      Left lower leg: No edema.   Skin:     General: Skin is warm and dry.      Capillary Refill: Capillary refill takes less than 2 seconds.      Findings: No lesion or rash.   Neurological:      General: No focal deficit present.       "Mental Status: He is alert and oriented to person, place, and time.      Cranial Nerves: No cranial nerve deficit.      Motor: No weakness.   Psychiatric:         Mood and Affect: Mood normal.         Behavior: Behavior normal.         Laboratory:  Recent Labs     07/20/25  1453   WBC 10.1   RBC 4.75   HEMOGLOBIN 14.5   HEMATOCRIT 42.4   MCV 89.3   MCH 30.5   MCHC 34.2   RDW 44.2   PLATELETCT 351   MPV 10.8     Recent Labs     07/20/25  1453   SODIUM 132*   POTASSIUM 4.0   CHLORIDE 97   CO2 19*   GLUCOSE 274*   BUN 19   CREATININE 1.09   CALCIUM 9.3     Recent Labs     07/20/25  1453   ALTSGPT 10   ASTSGOT 24   ALKPHOSPHAT 79   TBILIRUBIN 0.8   GLUCOSE 274*     Recent Labs     07/20/25  1453   APTT 29.9   INR 0.95     No results for input(s): \"NTPROBNP\" in the last 72 hours.      Recent Labs     07/20/25  1453   TROPONINT 19       Imaging:  DX-CHEST-PORTABLE (1 VIEW)   Final Result      No evidence of acute cardiopulmonary process.      CT-CTA NECK WITH & W/O-POST PROCESSING   Final Result      1.  No evidence of carotid or vertebral arterial occlusion or dissection.      2.  Again seen multiple heterogeneous enhancing neck masses anteriorly the largest of which measures 3.7 x 3.0 cm in size. These are unchanged from comparison.      CT-CTA HEAD WITH & W/O-POST PROCESS   Final Result      CT angiogram of the [place] within normal limits.      CT-CEREBRAL PERFUSION ANALYSIS   Final Result      1. Cerebral blood flow less than 30% possibly representing completed infarct = 1 mL. Based on distribution of this finding, this is likely to represent artifact.      2. T Max more than 6 seconds possibly representing combination of completed infarct and ischemia = 16 mL. Based on the distribution of this finding, this is likely to represent artifact.      3. Mismatched volume possibly representing ischemic brain/penumbra= 15 mL      4.  Please note that this cerebral perfusion study and report is Quantitative and " "targets supratentorial (cerebral) perfusion for evaluation of large vessel territory acute ischemia/infarction. For example, lacunar infarcts, and brainstem/posterior fossa    ischemia/infarction are not evaluated on this study.  Data acquisition is subject to artifacts which can yield non-anatomically plausible perfusion maps which may be due to motion, bolus timing, signal to noise ratio, or other technical factors.    Perfusion map abnormalities which show non-anatomic distributions are likely artifact.   This study is not \"stand-alone\" and should only be utilized for diagnosis, management/treatment in correlation with CT, CTA, and/or MRI and clinical factors.         CT-HEAD W/O   Final Result      1.  No evidence of acute intracranial process.      2.  Again seen prominent diffuse white matter disease.                   EKG 7/20/25  Measurements  Intervals                                Axis  Rate:       64                         P:          0  VT:        210                        QRS:   -26  QRSD:   102                        T:         -19  QT:         378  QTc:       390  Interpretive Statements  Poor quality data, interpretation may be affected  Sinus rhythm  Borderline prolonged VT interval  Consider right atrial enlargement  Borderline left axis deviation  Probable anterior infarct, age indeterminate  ST elevation, consider inferior injury  Lateral leads are also involved        ASSESSMENT/PLAN:  * Stroke-like symptom- (present on admission)  Assessment & Plan  Evaluated by neurologist, Dr Ayala, who has high suspicion for functional neurological disorder or conversion disorder. Patient's symptoms on my exam not consistent with CVA. Likely exacerbation secondary to poor oral intake  CTA head/neck are normal.  - dc any further neuro workup per neurology  - PT/OT eval and treat due to weakness    Generalized weakness- (present on admission)  Assessment & Plan  Secondary to poor oral intake the last " "couple weeks since starting ozempic about 3 weeks ago  - PT/OT eval and treat  - encourage oral intake  - IV fluids  - encourage discussion of ozempic with his op providers   - UA    Type 1 diabetes mellitus with hyperglycemia (HCC)- (present on admission)  Assessment & Plan  . Records are unclear regarding Lantus dosage. Patient says he takes Lantus 60 units TID, but records show BID. Note from endocrinologist are not specific on the exact amount (though it dose mention patient is on 120 units daily). A1c 13.7, which is down-trending for him  - Will give Lantus 40 units BID for now  - continue metformin   - accucheck ac/hs with med dose ssi coverage  - recommend strict DM (low carb) diet on DC    Pseudohyponatremia- (present on admission)  Assessment & Plan  Na+132,   - IV fluids  - Glucose control    Hypothyroidism- (present on admission)  Assessment & Plan  S/p thyroidectomy  - resume synthroid    Seizure disorder (HCC)- (present on admission)  Assessment & Plan  - resume depakote    Other hyperlipidemia- (present on admission)  Assessment & Plan  - resume high dose statin, lofibra    Bipolar disorder (HCC)- (present on admission)  Assessment & Plan  - resume Buspar, Latuda, zoloft, naltrexone      Justification for Admission Status  I anticipate this patient is appropriate for observation status at this time because patient requires further evaluation of stroke-like symptoms, including brain MRI    Patient will need a Med/Surg bed on MEDICAL service. The need is secondary to further evaluation of stroke-like symptoms with need for frequent neurochecks.    VTE prophylaxis: SCDs/TEDs and heparin ppx           [1]   Allergies  Allergen Reactions    Geodon [Ziprasidone Hcl] Anaphylaxis     Anaphylaxis per patient    Aripiprazole      \"I became lethargic.\"    Abilify      \"Feeling tired, like I don't even know whats going on around me\"    Fish      Pt reports salmon causes him to be sick to his " "stomach  Not listed on MAR noted 2/3/2021      Hydroxyzine Itching     Pt will only state \"I feel itchy when I take it     "

## 2025-07-21 NOTE — PROGRESS NOTES
Single point cane fit to patient for post-discharge use.    Contact traction with any questions or concerns regarding the use of this DME.

## 2025-07-21 NOTE — ASSESSMENT & PLAN NOTE
Significant history of  Usually has R weakness that improves, has had > 20 CT/mri  Well known to neuro service    7/23: weakness improving    7/26: Significantly improving will have patient work with PT again tomorrow likely home

## 2025-07-21 NOTE — ED NOTES
Med Rec complete per patient with med list   Allergies reviewed  Anti-MICROBIAL (antivirals/antibiotics/antifungal) in past 30 days: no  Anticoagulant in past 14 days: no  Pharmacy patient utilizes:[facility]    Patient states he threw up his medications this morning.

## 2025-07-21 NOTE — ASSESSMENT & PLAN NOTE
. Records are unclear regarding Lantus dosage. Patient says he takes Lantus 60 units TID, but records show BID. Note from endocrinologist are not specific on the exact amount (though it dose mention patient is on 120 units daily). A1c 13.7, which is down-trending for him  - Will give Lantus 40 units BID for now  - continue metformin   - accucheck ac/hs with med dose ssi coverage  - recommend strict DM (low carb) diet on DC    7/23: hypoglycemia today  - lantus decreased 35 units BID    7/24: Hypoglycemia again today  -Decrease Lantus to 32 units twice daily     7/25: Sugars improved today    7/26: Sugars stable

## 2025-07-21 NOTE — PROGRESS NOTES
I called and gave report to OMID Muñiz on Ketty 5.  All questions and concerns have been addressed.  Pt's belongings will be gathered along with any medications and his chart. Patient will go via bed by transport.

## 2025-07-21 NOTE — PROGRESS NOTES
4 Eyes Skin Assessment Completed by OMID Muñiz and OMID Benson.    Skin assessment is primarily focused on high risk bony prominences. Pay special attention to skin beneath and around medical devices, high risk bony prominences, skin to skin areas and areas where the patient lacks sensation to feel pain and areas where the patient previously had breakdown.     Head (Occipital):  WDL   Ears (Under Medical Devices): WDL   Nose (Under Medical Devices): WDL   Mouth:  WDL   Neck: WDL   Breast/Chest:  WDL   Shoulder Blades:  Red and Blanching   Spine:   Red and Blanching   (R) Arm/Elbow/Hand: WDL   (L) Arm/Elbow/Hand: Red and Blanching   Abdomen: WDL   Pannus/Groin:  Pink and Blanching   Sacrum/Coccyx:   Red and Blanching   (R) Ischial Tuberosity (Sit Bones):  WDL   (L) Ischial Tuberosity (Sit Bones):  WDL   (R) Leg:  Scar   (L) Leg:  Scab, Red, and Blanching   (R) Heel:  Pink, Blanching, and Flaky   (R) Foot/Toe: Flaky   (L) Heel: Flaky   (L) Foot/Toe:  Flaky       DEVICES IN USE:   Respiratory Devices:  NA, patient on room air  Feeding Devices:  N/A   Lines & BP Monitoring Devices:  Peripheral IV    Orthopedic Devices:  N/A  Miscellaneous Devices:  N/A    PROTOCOL INTERVENTIONS:   Standard/Trauma Bed:  Already in place  Offloading Dressing - Sacrum:  Applied this assessment  Offloading Dressing - Heel:  Applied this assessment  Float Heels with Pillows:  Applied this assessment    WOUND PHOTOS:   Completed and in EPIC     WOUND CONSULT:   N/A, no advanced wound care needs identified

## 2025-07-21 NOTE — PROGRESS NOTES
Report received from OMID Daniels.  Assumed care of patient.  Assessment complete.  Plan of care gone over with the patient and all concerns addressed.  Patient resting in bed. Patient A & O  x 4.  No apparent signs of distress.  Safety precautions in place.  Patient educated to call for assistance.  Hourly rounding in place.

## 2025-07-21 NOTE — THERAPY
"Physical Therapy   Initial Evaluation     Patient Name:  Norm Prabhakar  Age:  45 y.o., Sex:  male  Medical Record #:  [id number]  Today's Date: 7/21/2025     Precautions  Medical: Fall Risk    Assessment  Norm Prabhakar is a 45 y.o. male with a pmh of DM type 1, seizure DO, conversion DO, PTSD, bipolar DO, hypothyroidism, s/p thyroidectomy, poor medication compliance who presented to the ED on 7/20/2025 with complaints of facial droop and weakness.  Patient seen for PT eval and and demos pain, impaired balance, strength and mobility.  He has R-sided weakness and hypotension with some dizziness with minimal mobility.  Patient lives alone and has limited assist at home. Patient will benefit from placement at this time.  Query functional neurological disorder with his inconsistent strength and functional observation. Will continue to follow while in house.       Plan    Physical Therapy Initial Treatment Plan   Treatment Plan : Bed Mobility, Equipment, Gait Training, Neuro Re-Education / Balance, Self Care / Home Evaluation, Stair Training, Therapeutic Exercise, Therapeutic Activities, Family / Caregiver Training  Treatment Frequency: 4 Times per Week  Duration: Until Therapy Goals Met    DC Equipment Recommendations: Unable to determine at this time  Discharge Recommendations: Recommend post-acute placement for additional physical therapy services prior to discharge home       Subjective    \"I've just had no appetite\"     Objective     07/21/25 0930   Precautions   Medical Fall Risk   Vitals   Patient BP Position Sitting   Blood Pressure (!) 88/56   Vitals Comments patient with hypotension throughtout and reports some dizziness but doesn't increase with positional changes   Pain 0 - 10 Group   Location Head   Therapist Pain Assessment Post Activity Pain Same as Prior to Activity;2   Prior Living Situation   Prior Services Home-Independent   Housing / Facility 1 Story Apartment / Condo   Steps Into Home 1   Steps In " Home 0   Bathroom Set up Bathtub / Shower Combination   Equipment Owned Front-Wheel Walker;Single Point Cane   Lives with - Patient's Self Care Capacity Alone and Unable to Care For Self   Comments Patient has a / but patient viral doesn't have any asssist   Prior Level of Functional Mobility   Bed Mobility Independent   Transfer Status Independent   Ambulation Independent   Ambulation Distance community distances   Assistive Devices Used None   Comments indep at baseline, reports recent weakness from starting to take Ozempic   History of Falls   History of Falls Yes   Date of Last Fall   (Has had several falls in the last year)   Cognition    Cognition / Consciousness X   Speech/ Communication Slurred   Level of Consciousness Alert   Comments pleasant and cooperative with some delayed responses   Active ROM Upper Body   Active ROM Upper Body  X   Comments R UE limited by weakness   Strength Upper Body   Upper Body Strength  X   Comments with formal testing R UE flaccid, however, patient observed moving R UE against gravity with functional tasks   Sensation Upper Body   Upper Extremity Sensation  WDL   Active ROM Lower Body    Active ROM Lower Body  X   Comments limited by weakness.   Strength Lower Body   Lower Body Strength  X   Comments R LE inconsistent with testing. Patient flaccid with MMT but able to lift R foot and DF actively to don sock.   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 Educated about not comsumming too much watre to avoid hyponatremia and about the importance of adhearance to medications with the appropriate dosing   Balance Assessment   Sitting Balance (Static) Fair   Sitting Balance (Dynamic) Fair   Standing Balance (Static) Fair -    Standing Balance (Dynamic) Poor +   Weight Shift Sitting Fair   Weight Shift Standing Fair   Comments w/FWW   Bed Mobility    Supine to Sit Supervised   Sit to Supine Minimal Assist   Scooting Supervised   Comments with HOB  elevated, needed assist for R LE BTB, educated about hooking technique   Gait Analysis   Gait Level Of Assist Minimal Assist   Assistive Device Front Wheel Walker   Distance (Feet) 2   # of Times Distance was Traveled 1   Deviation Bradykinetic;Shuffled Gait   Comments side steps towards HOB   Functional Mobility   Sit to Stand Minimal Assist   Mobility STSx2 with side steps towards the HOB   6 Clicks Assessment - How much HELP from from another person do you currently need... (If the patient hasn't done an activity recently, how much help from another person do you think he/she would need if he/she tried?)   Turning from your back to your side while in a flat bed without using bedrails? 3   Moving from lying on your back to sitting on the side of a flat bed without using bedrails? 3   Moving to and from a bed to a chair (including a wheelchair)? 2   Standing up from a chair using your arms (e.g., wheelchair, or bedside chair)? 2   Walking in hospital room? 2   Climbing 3-5 steps with a railing? 1   6 clicks Mobility Score 13   Activity Tolerance   Sitting Edge of Bed 10 min   Standing 1 min   Short Term Goals    Short Term Goal # 1 in 6 visits patient will demo all bed mobility indep from flat surface for safe DC   Short Term Goal # 2 in 6 visits patient will demo all functional transfers with Sup and LRAD for safe DC   Short Term Goal # 3 in 6 visits patient will ambualte 200' with Sup and LRAD for safe DC   Education Group   Education Provided Role of Physical Therapist;Gait Training;Use of Assistive Device   Role of Physical Therapist Patient Response Patient;Acceptance;Explanation;Demonstration;Verbal Demonstration;Action Demonstration   Gait Training Patient Response  Patient;Acceptance;Explanation;Demonstration;Verbal Demonstration;Action Demonstration   Use of Assistive Device Patient Response Patient;Acceptance;Explanation;Demonstration;Verbal Demonstration;Action Demonstration   Physical Therapy Initial Treatment Plan    Treatment Plan  Bed Mobility;Equipment;Gait Training;Neuro Re-Education / Balance;Self Care / Home Evaluation;Stair Training;Therapeutic Exercise;Therapeutic Activities;Family / Caregiver Training   Treatment Frequency 4 Times per Week   Duration Until Therapy Goals Met   Problem List    Problems Pain;Impaired Bed Mobility;Impaired Transfers;Impaired Ambulation;Functional Strength Deficit;Impaired Balance;Impaired Coordination;Decreased Activity Tolerance   Anticipated Discharge Equipment and Recommendations   DC Equipment Recommendations Unable to determine at this time   Discharge Recommendations Recommend post-acute placement for additional physical therapy services prior to discharge home     Hanh North, PT, DPT, GCS

## 2025-07-21 NOTE — ASSESSMENT & PLAN NOTE
- resume Lantus 55 units HS  - continue metformin   - accucheck ac/hs with med dose ssi coverag  - A1c

## 2025-07-21 NOTE — PROGRESS NOTES
"Pt arrived to unit via gurney at this time. Slide transfer to bed as pt reports right sided weakness and feeling \"wobbly.\" Pt oriented to room, unit, and plan of care. All questions answered at this time. Call light within reach; fall precautions in place.     "

## 2025-07-21 NOTE — ASSESSMENT & PLAN NOTE
Evaluated by neurologist, Dr Ayala, who has high suspicion for functional neurological disorder or conversion disorder. Patient's symptoms on my exam not consistent with CVA. Likely exacerbation secondary to poor oral intake  CTA head/neck are normal.  - dc any further neuro workup per neurology  - PT/OT eval and treat due to weakness

## 2025-07-21 NOTE — ASSESSMENT & PLAN NOTE
S/p thyroidectomy  - resume synthroid    TSH 80  -continue home synthroid endocrinologist who he sees outpatient  There has been concerns in the past about him not taking his medications, when I asked him if he is taking his Synthroid he said yes however then when I asked him how many times he is missed the medication in the last week or so he said 2-3 times  - Continue current Synthroid dose

## 2025-07-21 NOTE — PROGRESS NOTES
[facility] Medicine Daily Progress Note    Date of Service  7/21/2025    Chief Complaint  Norm Prabhakar is a 45 y.o. male admitted 7/20/2025 with stroke like symptoms    Hospital Course  Mr. Norm Prabhakar is a 45 y.o. male with a pmh of DM type 1, seizure DO, conversion DO, PTSD, bipolar DO, hypothyroidism, s/p thyroidectomy, poor medication compliance who presented to the ED on 7/20/2025 with complaints of facial droop and weakness.     On examination, the patient tells me that he has been having very poor appetite since being started on it was epic three weeks ago. He is getting weaker, more fatigued and having difficulty walking due to his poor nutritional intake. This morning, when he took his medications, and ended up vomiting them up. He is also been having some diarrhea since starting was epic. Patient says his right sided weakness is worse than normal, but feels this is exacerbated his increasing fatigue and general weakness. I spoke with patient's  who tells me that she is the one that asked him to call 911 when he told her he was having some tingling in his arm. Patient denies any current numbness or tingling, difficulty with speech, difficulty with swallowing. He denies any other focal weakness, dizziness, lightheadedness. He denies  complaints.     Patient says he himself, takes the bus around town, is normal able to take care of himself.  Patient was evaluated by neurology while in the ED who feels patient symptoms are not CVA related, and recommends no additional neurological workup. Patient was hopeful to discharge from the ED; however, he reported ongoing bilateral lower extremity weakness. He felt using a cane may be helpful, so was provided one. Fortunately, patient was unable to ambulate well enough to be discharged home.     CBC unremarkable. CMP with glucose 274, sodium 132. Otherwise unremarkable. Troponin normal. A1c 13.7, which is improved from prior. CT head, CT and CT  perfusion are all unremarkable.  Patient admitted to hospital medicine for management of care.    On assessment, patient is back to his baseline, however, when evaluated by PT/OT, patient will likely need placement to rehab.  Patient's symptoms inconsistent with any neurological disorder however, patient does have a pretty significant history of conversion disorder.  While being evaluated, patient was unable to do assessment tasks however, when patient given a gown to change into, he was able to place himself in again with no difficulty, no falls, and no neurological deficits or weakness noted.       Interval Problem Update  Patient seen and examined.  According to patient, he is back to his baseline.  He has been taking Ozempic this past 3 weeks of which he is appetite has significantly decreased.  Discussed with patient still significantly elevated A1c of 13.1, and triglycerides.  Will obtain all of his repeat labs in the morning.  Notable conversion disorder which is contributory to his inconsistent assessment with no other focal neurological deficits noted.  Plan of care: Continue to monitor patient at he is high risk for fall; monitor lab work  Disposition: Patient anticipated to stay overnight until his symptoms has improved  Lab work: Reviewed; expected  VSS at this time    I have discussed this patient's plan of care and discharge plan at IDT rounds today with Case Management, Nursing, Nursing leadership, and other members of the IDT team.    Consultants/Specialty  NONE    Code Status  Full Code    Disposition  The patient is not medically cleared for discharge to home or a post-acute facility.  Anticipate discharge to: home with close outpatient follow-up    I have placed the appropriate orders for post-discharge needs.    Review of Systems  Review of Systems   Constitutional:  Positive for malaise/fatigue and weight loss. Negative for chills and fever.   HENT: Negative.     Eyes: Negative.    Respiratory:  Negative.     Cardiovascular: Negative.    Gastrointestinal:  Positive for nausea.   Genitourinary: Negative.    Musculoskeletal:  Positive for myalgias.   Skin: Negative.    Neurological:  Positive for sensory change and weakness.   Endo/Heme/Allergies: Negative.    Psychiatric/Behavioral:  Positive for suicidal ideas.         Physical Exam  Temp:  [35.9 °C (96.7 °F)-36.7 °C (98.1 °F)] 35.9 °C (96.7 °F)  Pulse:  [52-74] 59  Resp:  [14-17] 17  BP: ()/(55-83) 100/55  SpO2:  [91 %-94 %] 94 %    Physical Exam  Vitals and nursing note reviewed.   Constitutional:       Appearance: He is obese. He is ill-appearing.   HENT:      Head: Normocephalic.      Nose: Nose normal.      Mouth/Throat:      Mouth: Mucous membranes are moist.      Pharynx: Oropharynx is clear.   Eyes:      Pupils: Pupils are equal, round, and reactive to light.   Cardiovascular:      Rate and Rhythm: Normal rate and regular rhythm.      Pulses: Normal pulses.      Heart sounds: Normal heart sounds.   Pulmonary:      Effort: Pulmonary effort is normal.      Breath sounds: Normal breath sounds.   Abdominal:      General: Bowel sounds are normal.      Palpations: Abdomen is soft.   Musculoskeletal:         General: Tenderness present.      Cervical back: Normal range of motion and neck supple.   Skin:     General: Skin is dry.      Capillary Refill: Capillary refill takes 2 to 3 seconds.   Neurological:      General: No focal deficit present.      Mental Status: He is alert. Mental status is at baseline.      Motor: Weakness present.      Gait: Gait abnormal.         Fluids    Intake/Output Summary (Last 24 hours) at 7/21/2025 1054  Last data filed at 7/21/2025 0538  Gross per 24 hour   Intake 857.65 ml   Output 800 ml   Net 57.65 ml        Laboratory  Recent Labs     07/20/25  1453   WBC 10.1   RBC 4.75   HEMOGLOBIN 14.5   HEMATOCRIT 42.4   MCV 89.3   MCH 30.5   MCHC 34.2   RDW 44.2   PLATELETCT 351   MPV 10.8     Recent Labs     07/20/25  1453  07/21/25  0025   SODIUM 132* 133*   POTASSIUM 4.0 3.1*   CHLORIDE 97 99   CO2 19* 19*   GLUCOSE 274* 226*   BUN 19 17   CREATININE 1.09 0.94   CALCIUM 9.3 9.0     Recent Labs     07/20/25  1453   APTT 29.9   INR 0.95         Recent Labs     07/21/25  0025   TRIGLYCERIDE 1078*   HDL 32*   LDL see below       Imaging  DX-CHEST-PORTABLE (1 VIEW)   Final Result      No evidence of acute cardiopulmonary process.      CT-CTA NECK WITH & W/O-POST PROCESSING   Final Result      1.  No evidence of carotid or vertebral arterial occlusion or dissection.      2.  Again seen multiple heterogeneous enhancing neck masses anteriorly the largest of which measures 3.7 x 3.0 cm in size. These are unchanged from comparison.      CT-CTA HEAD WITH & W/O-POST PROCESS   Final Result      CT angiogram of the [place] within normal limits.      CT-CEREBRAL PERFUSION ANALYSIS   Final Result      1. Cerebral blood flow less than 30% possibly representing completed infarct = 1 mL. Based on distribution of this finding, this is likely to represent artifact.      2. T Max more than 6 seconds possibly representing combination of completed infarct and ischemia = 16 mL. Based on the distribution of this finding, this is likely to represent artifact.      3. Mismatched volume possibly representing ischemic brain/penumbra= 15 mL      4.  Please note that this cerebral perfusion study and report is Quantitative and targets supratentorial (cerebral) perfusion for evaluation of large vessel territory acute ischemia/infarction. For example, lacunar infarcts, and brainstem/posterior fossa    ischemia/infarction are not evaluated on this study.  Data acquisition is subject to artifacts which can yield non-anatomically plausible perfusion maps which may be due to motion, bolus timing, signal to noise ratio, or other technical factors.    Perfusion map abnormalities which show non-anatomic distributions are likely artifact.   This study is not  "\"stand-alone\" and should only be utilized for diagnosis, management/treatment in correlation with CT, CTA, and/or MRI and clinical factors.         CT-HEAD W/O   Final Result      1.  No evidence of acute intracranial process.      2.  Again seen prominent diffuse white matter disease.                    Assessment/Plan  Pseudohyponatremia- (present on admission)  Assessment & Plan  Na+132,   - IV fluids  - Glucose control  - Drinks about 3 L of water per day due to psychotropic medications    Generalized weakness- (present on admission)  Assessment & Plan  Secondary to poor oral intake the last couple weeks since starting ozempic about 3 weeks ago  - PT/OT eval and treat  - encourage oral intake  - IV fluids  - encourage discussion of ozempic with his op providers   - UA  - Unlikely neurological based    Bipolar disorder (HCC)- (present on admission)  Assessment & Plan  - resume Buspar, Latuda, zoloft, naltrexone    Type 1 diabetes mellitus with hyperglycemia (HCC)- (present on admission)  Assessment & Plan  . Records are unclear regarding Lantus dosage. Patient says he takes Lantus 60 units TID, but records show BID. Note from endocrinologist are not specific on the exact amount (though it dose mention patient is on 120 units daily). A1c 13.7, which is down-trending for him  - Will give Lantus 40 units BID for now  - continue metformin   - accucheck ac/hs with med dose ssi coverage  - recommend strict DM (low carb) diet on DC    Hypothyroidism- (present on admission)  Assessment & Plan  S/p thyroidectomy  - resume synthroid    Conversion disorder- (present on admission)  Assessment & Plan  Significant history of    Seizure disorder (HCC)- (present on admission)  Assessment & Plan  - resume depakote    Other hyperlipidemia- (present on admission)  Assessment & Plan  - resume high dose statin, lofibra         VTE prophylaxis:    heparin ppx      I have performed a physical exam and reviewed and updated " ROS and Plan today (7/21/2025). In review of yesterday's note (7/20/2025), there are no changes except as documented above.      IJanine DNP performed a substantiated portion of the service face-to-face with same patient on the same date of service INDEPENDENTLY FROM THE MD AND ASSESSMENT, EXAMINATION, DISCUSSION AND PLAN OF CARE FOR 27 MINUTES.  I was personally involved in reviewing and conducting the medical decision making, including the information as described above.

## 2025-07-22 ASSESSMENT — COGNITIVE AND FUNCTIONAL STATUS - GENERAL
DRESSING REGULAR LOWER BODY CLOTHING: A LITTLE
DRESSING REGULAR UPPER BODY CLOTHING: A LITTLE
PERSONAL GROOMING: A LITTLE
SUGGESTED CMS G CODE MODIFIER DAILY ACTIVITY: CK
HELP NEEDED FOR BATHING: A LITTLE
TOILETING: A LITTLE
DAILY ACTIVITIY SCORE: 18
EATING MEALS: A LITTLE

## 2025-07-22 ASSESSMENT — PAIN DESCRIPTION - PAIN TYPE
TYPE: ACUTE PAIN
TYPE: ACUTE PAIN

## 2025-07-22 ASSESSMENT — ENCOUNTER SYMPTOMS
CHILLS: 0
ABDOMINAL PAIN: 1
FEVER: 0
NAUSEA: 1
FOCAL WEAKNESS: 1
SENSORY CHANGE: 1

## 2025-07-22 ASSESSMENT — ACTIVITIES OF DAILY LIVING (ADL): TOILETING: INDEPENDENT

## 2025-07-22 NOTE — PROGRESS NOTES
Patient refused bed alarm. Explained importance of having a bed alarm as someone who is a fall risk, patient still refused. Charge RN aware.

## 2025-07-22 NOTE — DISCHARGE PLANNING
Referral sent to [place] [place]    1519- Referral sent to [facility]    1522-   Agency/Facility Name: [facility]  Plan or Request: KIRILL left  for Vin

## 2025-07-22 NOTE — PROGRESS NOTES
Report received from dayshift RN and assumed patient care at 1900. Patient is A&Ox 4, on RA, and reports a pain level of 0/10. Call light within reach and bed in lowest position. Reinforced the need to call for assistance. Plan of care discussed and patient does not have any further needs at this time.

## 2025-07-22 NOTE — PROGRESS NOTES
[facility] Medicine Daily Progress Note    Date of Service  7/22/2025    Chief Complaint  Norm Prabhakar is a 45 y.o. male admitted 7/20/2025 with possible stroke    Hospital Course  Mr. Norm Prabhakar is a 45 y.o. male with a pmh of DM type 1, seizure DO, conversion DO, PTSD, bipolar DO, hypothyroidism, s/p thyroidectomy, poor medication compliance who presented to the ED on 7/20/2025 with complaints of facial droop and weakness.     On examination, the patient tells me that he has been having very poor appetite since being started on it was epic three weeks ago. He is getting weaker, more fatigued and having difficulty walking due to his poor nutritional intake. This morning, when he took his medications, and ended up vomiting them up. He is also been having some diarrhea since starting was epic. Patient says his right sided weakness is worse than normal, but feels this is exacerbated his increasing fatigue and general weakness. I spoke with patient's  who tells me that she is the one that asked him to call 911 when he told her he was having some tingling in his arm. Patient denies any current numbness or tingling, difficulty with speech, difficulty with swallowing. He denies any other focal weakness, dizziness, lightheadedness. He denies  complaints.     Patient says he himself, takes the bus around town, is normal able to take care of himself.  Patient was evaluated by neurology while in the ED who feels patient symptoms are not CVA related, and recommends no additional neurological workup. Patient was hopeful to discharge from the ED; however, he reported ongoing bilateral lower extremity weakness. He felt using a cane may be helpful, so was provided one. Fortunately, patient was unable to ambulate well enough to be discharged home.     CBC unremarkable. CMP with glucose 274, sodium 132. Otherwise unremarkable. Troponin normal. A1c 13.7, which is improved from prior. CT head, CT and CT perfusion  are all unremarkable.  Patient admitted to hospital medicine for management of care.    On assessment, patient is back to his baseline, however, when evaluated by PT/OT, patient will likely need placement to rehab.  Patient's symptoms inconsistent with any neurological disorder however, patient does have a pretty significant history of conversion disorder.  While being evaluated, patient was unable to do assessment tasks however, when patient given a gown to change into, he was able to place himself in again with no difficulty, no falls, and no neurological deficits or weakness noted.       Interval Problem Update  - TSH patient reports he has been taking his Synthroid however when I asked him how many doses he is missed he80, reports at least 2-3 in the last 2 weeks  - Working on discharge planning  - Patient still reporting right-sided weakness  - K3.4    I have discussed this patient's plan of care and discharge plan at IDT rounds today with Case Management, Nursing, Nursing leadership, and other members of the IDT team.    Consultants/Specialty  Neuro-signed off    Code Status  Full Code    Disposition  The patient is not medically cleared for discharge to home or a post-acute facility.  Anticipate discharge to: home with organized home healthcare and close outpatient follow-up    I have placed the appropriate orders for post-discharge needs.    Review of Systems  Review of Systems   Constitutional:  Positive for malaise/fatigue. Negative for chills and fever.   Gastrointestinal:  Positive for abdominal pain and nausea.   Neurological:  Positive for sensory change and focal weakness.        Physical Exam  Temp:  [36.6 °C (97.8 °F)-36.7 °C (98.1 °F)] 36.7 °C (98 °F)  Pulse:  [58-70] 68  Resp:  [16-20] 16  BP: ()/(55-67) 100/61  SpO2:  [95 %-97 %] 95 %    Physical Exam  Constitutional:       General: He is not in acute distress.     Appearance: He is not ill-appearing, toxic-appearing or diaphoretic.   HENT:       Head: Normocephalic and atraumatic.      Mouth/Throat:      Mouth: Mucous membranes are moist.   Eyes:      General: No scleral icterus.     Conjunctiva/sclera: Conjunctivae normal.   Cardiovascular:      Rate and Rhythm: Normal rate and regular rhythm.      Heart sounds: No murmur heard.     No friction rub. No gallop.   Pulmonary:      Effort: Pulmonary effort is normal.      Breath sounds: Normal breath sounds.   Abdominal:      General: Bowel sounds are normal. There is no distension.      Palpations: Abdomen is soft.      Tenderness: There is no abdominal tenderness.   Musculoskeletal:      Right lower leg: No edema.      Left lower leg: No edema.   Neurological:      Mental Status: He is alert and oriented to person, place, and time.      Motor: Weakness present.         Fluids    Intake/Output Summary (Last 24 hours) at 7/22/2025 1256  Last data filed at 7/22/2025 1038  Gross per 24 hour   Intake 100 ml   Output --   Net 100 ml        Laboratory  Recent Labs     07/20/25  1453 07/22/25  0134   WBC 10.1 11.3*   RBC 4.75 4.18*   HEMOGLOBIN 14.5 12.6*   HEMATOCRIT 42.4 38.3*   MCV 89.3 91.6   MCH 30.5 30.1   MCHC 34.2 32.9   RDW 44.2 44.6   PLATELETCT 351 282   MPV 10.8 10.8     Recent Labs     07/20/25  1453 07/21/25  0025 07/22/25  0134   SODIUM 132* 133* 135   POTASSIUM 4.0 3.1* 3.4*   CHLORIDE 97 99 100   CO2 19* 19* 22   GLUCOSE 274* 226* 110*   BUN 19 17 19   CREATININE 1.09 0.94 1.17   CALCIUM 9.3 9.0 9.4     Recent Labs     07/20/25  1453   APTT 29.9   INR 0.95         Recent Labs     07/21/25  0025 07/22/25  0134   TRIGLYCERIDE 1078* 882*   HDL 32* 32*   LDL see below see below       Imaging  DX-CHEST-PORTABLE (1 VIEW)   Final Result      No evidence of acute cardiopulmonary process.      CT-CTA NECK WITH & W/O-POST PROCESSING   Final Result      1.  No evidence of carotid or vertebral arterial occlusion or dissection.      2.  Again seen multiple heterogeneous enhancing neck masses anteriorly the  "largest of which measures 3.7 x 3.0 cm in size. These are unchanged from comparison.      CT-CTA HEAD WITH & W/O-POST PROCESS   Final Result      CT angiogram of the [facility] within normal limits.      CT-CEREBRAL PERFUSION ANALYSIS   Final Result      1. Cerebral blood flow less than 30% possibly representing completed infarct = 1 mL. Based on distribution of this finding, this is likely to represent artifact.      2. T Max more than 6 seconds possibly representing combination of completed infarct and ischemia = 16 mL. Based on the distribution of this finding, this is likely to represent artifact.      3. Mismatched volume possibly representing ischemic brain/penumbra= 15 mL      4.  Please note that this cerebral perfusion study and report is Quantitative and targets supratentorial (cerebral) perfusion for evaluation of large vessel territory acute ischemia/infarction. For example, lacunar infarcts, and brainstem/posterior fossa    ischemia/infarction are not evaluated on this study.  Data acquisition is subject to artifacts which can yield non-anatomically plausible perfusion maps which may be due to motion, bolus timing, signal to noise ratio, or other technical factors.    Perfusion map abnormalities which show non-anatomic distributions are likely artifact.   This study is not \"stand-alone\" and should only be utilized for diagnosis, management/treatment in correlation with CT, CTA, and/or MRI and clinical factors.         CT-HEAD W/O   Final Result      1.  No evidence of acute intracranial process.      2.  Again seen prominent diffuse white matter disease.                    Assessment/Plan  Conversion disorder- (present on admission)  Assessment & Plan  Significant history of  Usually has R weakness that improves, has had > 20 CT/mri  Well known to neuro service    Pseudohyponatremia- (present on admission)  Assessment & Plan  Na+132,   - IV fluids  - Glucose control  - Drinks about 3 L of " water per day due to psychotropic medications    Generalized weakness- (present on admission)  Assessment & Plan  Secondary to poor oral intake the last couple weeks since starting ozempic about 3 weeks ago  - PT/OT eval and treat  - encourage oral intake  - IV fluids  - encourage discussion of ozempic with his op providers   - UA  - Unlikely neurological based    Bipolar disorder (HCC)- (present on admission)  Assessment & Plan  - resume Buspar, Latuda, zoloft, naltrexone    Type 1 diabetes mellitus with hyperglycemia (HCC)- (present on admission)  Assessment & Plan  . Records are unclear regarding Lantus dosage. Patient says he takes Lantus 60 units TID, but records show BID. Note from endocrinologist are not specific on the exact amount (though it dose mention patient is on 120 units daily). A1c 13.7, which is down-trending for him  - Will give Lantus 40 units BID for now  - continue metformin   - accucheck ac/hs with med dose ssi coverage  - recommend strict DM (low carb) diet on DC    Hypothyroidism- (present on admission)  Assessment & Plan  S/p thyroidectomy  - resume synthroid    TSH 80  -continue home synthroid endocrinologist who he sees outpatient  There has been concerns in the past about him not taking his medications, when I asked him if he is taking his Synthroid he said yes however then when I asked him how many times he is missed the medication in the last week or so he said 2-3 times  - Continue current Synthroid dose      Seizure disorder (HCC)- (present on admission)  Assessment & Plan  - resume depakote    Other hyperlipidemia- (present on admission)  Assessment & Plan  - resume high dose statin, lofibra         VTE prophylaxis:    heparin ppx      I have performed a physical exam and reviewed and updated ROS and Plan today (7/22/2025). In review of yesterday's note (7/21/2025), there are no changes except as documented above.

## 2025-07-22 NOTE — CARE PLAN
The patient is Stable - Low risk of patient condition declining or worsening    Shift Goals  Clinical Goals: Patient neuro status will remain unchanged or improve.  Patient Goals: sleep  Family Goals: JUANCARLOS    Progress made toward(s) clinical / shift goals:  Patient has maintained neuro status and remained free from falls this shift.    Patient is not progressing towards the following goals:

## 2025-07-22 NOTE — CARE PLAN
The patient is Stable - Low risk of patient condition declining or worsening    Shift Goals  Clinical Goals: Pt will remain free from falls throughout shift. Monitor BG levels.  Patient Goals: Rest  Family Goals: JUANCARLOS    Pt is A&Ox4, on RA, and stable. Monitor BG levels. All needs are met and questions answered at this time.    Progress made toward(s) clinical / shift goals:    Problem: Pain - Standard  Goal: Alleviation of pain or a reduction in pain to the patient’s comfort goal  Outcome: Progressing  Note: Pt has remained at a comfortable pain level throughout shift.      Problem: Knowledge Deficit - Standard  Goal: Patient and family/care givers will demonstrate understanding of plan of care, disease process/condition, diagnostic tests and medications  Outcome: Progressing  Note: Pt verbalizes understanding of POC and status.     Problem: Skin Integrity  Goal: Skin integrity is maintained or improved  Outcome: Progressing  Note: Pt has maintained skin integrity throughout shift.     Problem: Fall Risk  Goal: Patient will remain free from falls  Outcome: Progressing  Note: Pt has remained free from falls throughout shift.       Patient is not progressing towards the following goals:

## 2025-07-22 NOTE — PROGRESS NOTES
Report received from NOC RN and assumed patient care at 0700. Patient is A&Ox 4, on RA, and resting with unlabored breathing, even rise and fall of chest. Patient reporting no pain at this time. Call light within reach and bed in lowest position. Reinforced the need to call for assistance. Plan of care discussed and patient does not have any further needs at this time.

## 2025-07-22 NOTE — THERAPY
Occupational Therapy   Initial Evaluation     Patient Name:  Norm Prabhakar  Age:  45 y.o., Sex:  male  Medical Record #:  [id number]  Today's Date:  7/22/2025     Precautions  Medical: Fall Risk    Assessment  Patient is 45 y.o. male admitted 7/20 with AMS and R sided weakness. He has a hx of functional neurological disorder, DM1, HTN, hypothyroidism, PTSD, anxiety, depression, bipolar disorder. He is known to this therapist from previous admits. He has been worked up for stroke, neurology suspecting secondary deficits from functional neurological disorder.     Pt is currently limited by weakness, fatigue, impaired balance, impaired safety awareness, and impaired RUE function, which impacts ability to complete ADLs, ADL txfs, and functional mobility. Inconsistent abilities with functional observation with use of RUE vs. Formal testing. See below for more details.     Plan    Occupational Therapy Initial Treatment Plan   Treatment Interventions: Self Care / Activities of Daily Living, Adaptive Equipment, Neuro Re-Education / Balance, Therapeutic Exercises, Therapeutic Activity, Family / Caregiver Training  Treatment Frequency: 4 Times per Week  Duration: Until Therapy Goals Met    DC Equipment Recommendations: Unable to determine at this time  Discharge Recommendations: Recommend post-acute placement for additional occupational therapy services prior to discharge home      Objective       07/22/25 1056   Prior Living Situation   Prior Services Home-Independent   Housing / Facility 1 Story Apartment / Condo   Steps Into Home 1   Bathroom Set up Bathtub / Shower Combination;Grab Bars   Equipment Owned Front-Wheel Walker;Single Point Cane;Grab Bar(s) In Tub / Shower   Lives with - Patient's Self Care Capacity Alone and Able to Care For Self   Comments Pt has a  that helps him with meds once a week, drives him to the store and appointments. Otherwise he will take the bus.   Prior Level of ADL Function   Self  Feeding Independent   Grooming / Hygiene Independent   Bathing Independent   Dressing Independent   Toileting Independent   Prior Level of IADL Function   Medication Management Requires Assist   Laundry Independent   Kitchen Mobility Independent   Finances Independent   Home Management Independent   Shopping Requires Assist   Prior Level Of Mobility Independent Without Device in Community;Independent Without Device in Home   Occupation (Pre-Hospital Vocational) Not Employed   History of Falls   History of Falls Yes   Precautions   Medical Fall Risk   Vitals   Vitals Comments BP checked throughout positionally and stable   Pain 0 - 10 Group   Therapist Pain Assessment Nurse Notified;During Activity;0   Cognition    Cognition / Consciousness X   Speech/ Communication Hard of Hearing   Level of Consciousness Alert   Comments pleasant and cooperative, inconsistent presentation of symptoms with observation vs. formal testing   Active ROM Upper Body   Active ROM Upper Body  X   Dominant Hand Right   Comments Pt able to use his RUE as gross assist and position his RUE during ADLs using his LUE, able to hold onto the walker and push up from the bed w/ RUE, as well as don sock with BUE   Strength Upper Body   Upper Body Strength  X   Comments with formal testing pt with 2/5 strength RUE, however was able to use arm throughout as described above   Balance Assessment   Sitting Balance (Static) Fair   Sitting Balance (Dynamic) Fair   Standing Balance (Static) Fair -   Standing Balance (Dynamic) Poor +   Weight Shift Sitting Fair   Weight Shift Standing Fair   Comments w/ fww   Bed Mobility    Supine to Sit Standby Assist   Scooting Standby Assist   ADL Assessment   Grooming Minimal Assist;Seated  (wash hair, brush hair, wash face, brush teeth; using RUE as gross assist)   Lower Body Dressing Minimal Assist  (don B socks using BUE)   Toileting   (declined need)   How much help from another person does the patient currently  need...   Putting on and taking off regular lower body clothing? 3   Bathing (including washing, rinsing, and drying)? 3   Toileting, which includes using a toilet, bedpan, or urinal? 3   Putting on and taking off regular upper body clothing? 3   Taking care of personal grooming such as brushing teeth? 3   Eating meals? 3   6 Clicks Daily Activity Score 18   Functional Mobility   Sit to Stand Minimal Assist   Bed, Chair, Wheelchair Transfer Minimal Assist   Transfer Method Stand Pivot   Mobility EOB>chair   Comments w/ fww   Short Term Goals   Short Term Goal # 1 pt will demo seated grooming incorporating BUE w/ supv   Short Term Goal # 2 pt will demo LB dressing w/ supv   Short Term Goal # 3 pt will demo toileting including clothing mgmt w/ supv

## 2025-07-22 NOTE — CARE PLAN
The patient is Stable - Low risk of patient condition declining or worsening    Shift Goals  Clinical Goals: Patient's neuro status will be monitored, Patient's blood glucose will be monitored and managed  Patient Goals: rest  Family Goals: JUANCARLOS    Progress made toward(s) clinical / shift goals:      Patient's neurological status was monitored with q4h neuro checks. Patient had improvement in strength from 1200 to 1600.     Problem: Acute Care of the Diabetic Patient  Goal: Optimal Outcome for the Diabetic Patient  Outcome: Progressing  Note: Patient's blood sugar was monitored through fsbg and managed through medication and dietary management       Patient is not progressing towards the following goals:

## 2025-07-22 NOTE — DISCHARGE PLANNING
Care Transition Team Assessment    Patient, Norm Prabhakar, is a 45-year-old admitted for possible stroke. Patient is alert and oriented x4; information was given by the patient. Please see pt's H&P for prior medical history. Patient reports living alone in a ground floor apartment; [address] [place] [address]. Pt confirmed contacts on facesheet  Diane [phone] and mother Brandi [phone]. Patient does have a PCP, Allyson Lunsford. Patient reports good support from . Patient has MTM benefits, patient's  assists with transport as well. Patient reports some food insecurity. Patient receives about $982/mo from SSI. Confirmed medical insurance is Medicaid. Uses a cane at baseline. Preferred pharmacy is [facility]. Patient denies mental health concerns and substance abuse. Patient's  assists with transport to medical appointments, shopping, and medication management. Patient states going to cooking and art classes at [facility]. Per patient, have attempted to get group home placement in the past but have not had luck placing him.    Patient states being open to SNF placement, ok to send to [facility] SNFs. Ok to update CW Diane.    Identified DC needs at this time:    Post-acute placement.    Information Source  Orientation Level: Oriented X4  Information Given By: Patient  Informant's Name: Norm Prabhakar  Who is responsible for making decisions for patient? : Patient    Readmission Evaluation  Is this a readmission?: No    Elopement Risk  Legal Hold: No  Ambulatory or Self Mobile in Wheelchair: Yes  Disoriented: No  Psychiatric Symptoms: None  History of Wandering: No  Elopement this Admit: No  Vocalizing Wanting to Leave: No  Displays Behaviors, Body Language Wanting to Leave: No-Not at Risk for Elopement  Elopement Risk: Not at Risk for Elopement    Interdisciplinary Discharge Planning  Primary Care Physician: Allyson Lunsford  Lives with - Patient's  Self Care Capacity: Alone and Able to Care For Self  Patient or legal guardian wants to designate a caregiver: No  Support Systems: Family Member(s), Spouse / Significant Other, Friends / Neighbors  Housing / Facility: 1 Story Apartment / Condo  Prior Services: Home-Independent    Discharge Preparedness  What is your plan after discharge?: Skilled nursing facility  Prior Functional Level: Ambulatory, Independent with Activities of Daily Living, Independent with Medication Management  Difficulity with ADLs: None  Difficulity with IADLs: Shopping, Driving    Functional Assesment  Prior Functional Level: Ambulatory, Independent with Activities of Daily Living, Independent with Medication Management    Vision / Hearing Impairment  Vision Impairment : Yes  Right Eye Vision: Impaired, Wears Glasses  Left Eye Vision: Blind, Wears Glasses  Hearing Impairment : No    Domestic Abuse  Have you ever been the victim of abuse or violence?: No  Possible Abuse/Neglect Reported to:: Not Applicable    Psychological Assessment  History of Substance Abuse: None  History of Psychiatric Problems: No    Anticipated Discharge Information  Discharge Disposition: D/T to SNF with Medicare cert in anticipation of skilled care (03)

## 2025-07-23 ASSESSMENT — COGNITIVE AND FUNCTIONAL STATUS - GENERAL
CLIMB 3 TO 5 STEPS WITH RAILING: A LOT
MOBILITY SCORE: 17
HELP NEEDED FOR BATHING: A LITTLE
STANDING UP FROM CHAIR USING ARMS: A LITTLE
TOILETING: A LITTLE
EATING MEALS: A LITTLE
SUGGESTED CMS G CODE MODIFIER MOBILITY: CK
MOVING TO AND FROM BED TO CHAIR: A LITTLE
DRESSING REGULAR UPPER BODY CLOTHING: A LITTLE
MOVING FROM LYING ON BACK TO SITTING ON SIDE OF FLAT BED: A LITTLE
PERSONAL GROOMING: A LITTLE
TURNING FROM BACK TO SIDE WHILE IN FLAT BAD: A LITTLE
DRESSING REGULAR LOWER BODY CLOTHING: A LITTLE
DAILY ACTIVITIY SCORE: 18
WALKING IN HOSPITAL ROOM: A LITTLE
SUGGESTED CMS G CODE MODIFIER DAILY ACTIVITY: CK

## 2025-07-23 ASSESSMENT — GAIT ASSESSMENTS
ASSISTIVE DEVICE: FRONT WHEEL WALKER
DISTANCE (FEET): 15
GAIT LEVEL OF ASSIST: CONTACT GUARD ASSIST
DEVIATION: DECREASED BASE OF SUPPORT;BRADYKINETIC

## 2025-07-23 ASSESSMENT — ENCOUNTER SYMPTOMS
SENSORY CHANGE: 1
NAUSEA: 0
FOCAL WEAKNESS: 1
FEVER: 0
VOMITING: 0
CHILLS: 0
ABDOMINAL PAIN: 0

## 2025-07-23 ASSESSMENT — PAIN DESCRIPTION - PAIN TYPE
TYPE: ACUTE PAIN
TYPE: ACUTE PAIN

## 2025-07-23 NOTE — PROGRESS NOTES
[facility] Medicine Daily Progress Note    Date of Service  7/23/2025    Chief Complaint  Norm Prabhakar is a 45 y.o. male admitted 7/20/2025 with possible stroke    Hospital Course  Mr. Norm Prabhakar is a 45 y.o. male with a pmh of DM type 1, seizure DO, conversion DO, PTSD, bipolar DO, hypothyroidism, s/p thyroidectomy, poor medication compliance who presented to the ED on 7/20/2025 with complaints of facial droop and weakness.     On examination, the patient tells me that he has been having very poor appetite since being started on it was epic three weeks ago. He is getting weaker, more fatigued and having difficulty walking due to his poor nutritional intake. This morning, when he took his medications, and ended up vomiting them up. He is also been having some diarrhea since starting was epic. Patient says his right sided weakness is worse than normal, but feels this is exacerbated his increasing fatigue and general weakness. I spoke with patient's  who tells me that she is the one that asked him to call 911 when he told her he was having some tingling in his arm. Patient denies any current numbness or tingling, difficulty with speech, difficulty with swallowing. He denies any other focal weakness, dizziness, lightheadedness. He denies  complaints.     Patient says he himself, takes the bus around town, is normal able to take care of himself.  Patient was evaluated by neurology while in the ED who feels patient symptoms are not CVA related, and recommends no additional neurological workup. Patient was hopeful to discharge from the ED; however, he reported ongoing bilateral lower extremity weakness. He felt using a cane may be helpful, so was provided one. Fortunately, patient was unable to ambulate well enough to be discharged home.     CBC unremarkable. CMP with glucose 274, sodium 132. Otherwise unremarkable. Troponin normal. A1c 13.7, which is improved from prior. CT head, CT and CT perfusion  are all unremarkable.  Patient admitted to hospital medicine for management of care.    On assessment, patient is back to his baseline, however, when evaluated by PT/OT, patient will likely need placement to rehab.  Patient's symptoms inconsistent with any neurological disorder however, patient does have a pretty significant history of conversion disorder.  While being evaluated, patient was unable to do assessment tasks however, when patient given a gown to change into, he was able to place himself in again with no difficulty, no falls, and no neurological deficits or weakness noted.       Interval Problem Update  - patient seen and examined at bedside  - sugars low this am, decreased lantus  - weakness improving      I have discussed this patient's plan of care and discharge plan at IDT rounds today with Case Management, Nursing, Nursing leadership, and other members of the IDT team.    Consultants/Specialty  Neuro-signed off    Code Status  Full Code    Disposition  The patient is medically cleared for discharge to home or a post-acute facility.  Anticipate discharge to: skilled nursing facility    I have placed the appropriate orders for post-discharge needs.    Review of Systems  Review of Systems   Constitutional:  Positive for malaise/fatigue. Negative for chills and fever.   Gastrointestinal:  Negative for abdominal pain, nausea and vomiting.   Neurological:  Positive for sensory change and focal weakness (improving).        Physical Exam  Temp:  [36.4 °C (97.5 °F)-36.6 °C (97.8 °F)] 36.4 °C (97.5 °F)  Pulse:  [58-69] 69  Resp:  [16-17] 16  BP: ()/(56-65) 91/56  SpO2:  [89 %-93 %] 93 %    Physical Exam  Constitutional:       General: He is not in acute distress.     Appearance: He is not ill-appearing, toxic-appearing or diaphoretic.   HENT:      Head: Normocephalic and atraumatic.      Mouth/Throat:      Mouth: Mucous membranes are moist.   Eyes:      General: No scleral icterus.      Conjunctiva/sclera: Conjunctivae normal.   Cardiovascular:      Rate and Rhythm: Normal rate and regular rhythm.      Heart sounds: No murmur heard.     No friction rub. No gallop.   Pulmonary:      Effort: Pulmonary effort is normal.      Breath sounds: Normal breath sounds.   Abdominal:      General: Bowel sounds are normal. There is no distension.      Palpations: Abdomen is soft.      Tenderness: There is no abdominal tenderness.   Musculoskeletal:      Right lower leg: No edema.      Left lower leg: No edema.   Neurological:      Mental Status: He is alert and oriented to person, place, and time.      Motor: Weakness (right sided weakness improving) present.         Fluids    Intake/Output Summary (Last 24 hours) at 7/23/2025 1029  Last data filed at 7/23/2025 0948  Gross per 24 hour   Intake 580 ml   Output --   Net 580 ml        Laboratory  Recent Labs     07/20/25  1453 07/22/25  0134 07/23/25  0039   WBC 10.1 11.3* 8.1   RBC 4.75 4.18* 4.12*   HEMOGLOBIN 14.5 12.6* 12.6*   HEMATOCRIT 42.4 38.3* 36.8*   MCV 89.3 91.6 89.3   MCH 30.5 30.1 30.6   MCHC 34.2 32.9 34.2   RDW 44.2 44.6 44.4   PLATELETCT 351 282 275   MPV 10.8 10.8 10.6     Recent Labs     07/21/25  0025 07/22/25  0134 07/23/25  0039   SODIUM 133* 135 136   POTASSIUM 3.1* 3.4* 3.4*   CHLORIDE 99 100 102   CO2 19* 22 20   GLUCOSE 226* 110* 137*   BUN 17 19 16   CREATININE 0.94 1.17 1.14   CALCIUM 9.0 9.4 9.2     Recent Labs     07/20/25  1453   APTT 29.9   INR 0.95         Recent Labs     07/21/25  0025 07/22/25  0134   TRIGLYCERIDE 1078* 882*   HDL 32* 32*   LDL see below see below       Imaging  DX-CHEST-PORTABLE (1 VIEW)   Final Result      No evidence of acute cardiopulmonary process.      CT-CTA NECK WITH & W/O-POST PROCESSING   Final Result      1.  No evidence of carotid or vertebral arterial occlusion or dissection.      2.  Again seen multiple heterogeneous enhancing neck masses anteriorly the largest of which measures 3.7 x 3.0 cm in size.  "These are unchanged from comparison.      CT-CTA HEAD WITH & W/O-POST PROCESS   Final Result      CT angiogram of the [place] within normal limits.      CT-CEREBRAL PERFUSION ANALYSIS   Final Result      1. Cerebral blood flow less than 30% possibly representing completed infarct = 1 mL. Based on distribution of this finding, this is likely to represent artifact.      2. T Max more than 6 seconds possibly representing combination of completed infarct and ischemia = 16 mL. Based on the distribution of this finding, this is likely to represent artifact.      3. Mismatched volume possibly representing ischemic brain/penumbra= 15 mL      4.  Please note that this cerebral perfusion study and report is Quantitative and targets supratentorial (cerebral) perfusion for evaluation of large vessel territory acute ischemia/infarction. For example, lacunar infarcts, and brainstem/posterior fossa    ischemia/infarction are not evaluated on this study.  Data acquisition is subject to artifacts which can yield non-anatomically plausible perfusion maps which may be due to motion, bolus timing, signal to noise ratio, or other technical factors.    Perfusion map abnormalities which show non-anatomic distributions are likely artifact.   This study is not \"stand-alone\" and should only be utilized for diagnosis, management/treatment in correlation with CT, CTA, and/or MRI and clinical factors.         CT-HEAD W/O   Final Result      1.  No evidence of acute intracranial process.      2.  Again seen prominent diffuse white matter disease.                    Assessment/Plan  Conversion disorder- (present on admission)  Assessment & Plan  Significant history of  Usually has R weakness that improves, has had > 20 CT/mri  Well known to neuro service    7/23: weakness improving    Pseudohyponatremia- (present on admission)  Assessment & Plan  Na+132,   - IV fluids  - Glucose control  - Drinks about 3 L of water per day due to " psychotropic medications    Generalized weakness- (present on admission)  Assessment & Plan  Secondary to poor oral intake the last couple weeks since starting ozempic about 3 weeks ago  - PT/OT eval and treat  - encourage oral intake  - IV fluids  - encourage discussion of ozempic with his op providers   - UA  - Unlikely neurological based    Bipolar disorder (HCC)- (present on admission)  Assessment & Plan  - resume Buspar, Latuda, zoloft, naltrexone    Type 1 diabetes mellitus with hyperglycemia (HCC)- (present on admission)  Assessment & Plan  . Records are unclear regarding Lantus dosage. Patient says he takes Lantus 60 units TID, but records show BID. Note from endocrinologist are not specific on the exact amount (though it dose mention patient is on 120 units daily). A1c 13.7, which is down-trending for him  - Will give Lantus 40 units BID for now  - continue metformin   - accucheck ac/hs with med dose ssi coverage  - recommend strict DM (low carb) diet on DC    7/23: hypoglycemia today  - lantus decreased 35 units BID    Hypothyroidism- (present on admission)  Assessment & Plan  S/p thyroidectomy  - resume synthroid    TSH 80  -continue home synthroid endocrinologist who he sees outpatient  There has been concerns in the past about him not taking his medications, when I asked him if he is taking his Synthroid he said yes however then when I asked him how many times he is missed the medication in the last week or so he said 2-3 times  - Continue current Synthroid dose      Hypokalemia- (present on admission)  Assessment & Plan  K 3.4 again  Po supplementation    Seizure disorder (HCC)- (present on admission)  Assessment & Plan  - resume depakote    Other hyperlipidemia- (present on admission)  Assessment & Plan  - resume high dose statin, lofibra         VTE prophylaxis:    Xarelto 10mg daily as prophylaxis      I have performed a physical exam and reviewed and updated ROS and Plan today (7/23/2025). In  review of yesterday's note (7/22/2025), there are no changes except as documented above.

## 2025-07-23 NOTE — THERAPY
Physical Therapy   Daily Treatment     Patient Name:  Norm Prabhakar  Age:  45 y.o., Sex:  male  Medical Record #:  [id number]  Today's Date: 7/23/2025     Precautions  Medical: Fall Risk    Assessment  Pt seen for PT tx session with mobility detailed down below. Pt appears to be progressing with functional tasks, however he is still limited by RLE weakness (UE worse than LE). Pt was able to ambulate short distances in his room today with use of FWW, no LOB. Pt's RLE weakness was somewhat inconsistent with his functional mobility, however he appeared to have more confidence when standing with FWW. Continue to recommend placement, will follow.     Plan    Treatment Plan Status: Continue Current Treatment Plan  Type of Treatment: Bed Mobility, Equipment, Gait Training, Neuro Re-Education / Balance, Self Care / Home Evaluation, Stair Training, Therapeutic Exercise, Therapeutic Activities, Family / Caregiver Training  Treatment Frequency: 4 Times per Week  Treatment Duration: Until Therapy Goals Met    DC Equipment Recommendations: Unable to determine at this time  Discharge Recommendations: Recommend post-acute placement for additional physical therapy services prior to discharge home        Vitals   O2 (LPM) 0   O2 Delivery Device None - Room Air   Pain 0 - 10 Group   Therapist Pain Assessment Post Activity Pain Same as Prior to Activity;0   Non Verbal Descriptors   Non Verbal Scale  Calm   Cognition    Level of Consciousness Alert   Comments pleasant and cooperative   Sitting Lower Body Exercises   Sitting Lower Body Exercises Yes   Long Arc Quad   (2x5 B)   Comments mild AAROM on the right, pt needing somewhat inconsistent level of assistance   Balance   Sitting Balance (Static) Fair +   Sitting Balance (Dynamic) Fair   Standing Balance (Static) Fair -   Standing Balance (Dynamic) Fair -   Weight Shift Sitting Fair   Weight Shift Standing Poor   Skilled Intervention Verbal Cuing   Comments FWW   Bed Mobility     Supine to Sit Standby Assist   Sit to Supine Minimal Assist   Scooting Standby Assist   Skilled Intervention Verbal Cuing   Gait Analysis   Gait Level Of Assist Contact Guard Assist   Assistive Device Front Wheel Walker   Distance (Feet) 15   # of Times Distance was Traveled 2   Deviation Decreased Base Of Support;Bradykinetic  (increased hip hike on right to compensate for poor DF)   # of Stairs Climbed 0   Weight Bearing Status no restrictions   Skilled Intervention Verbal Cuing   Functional Mobility   Sit to Stand Minimal Assist   Bed, Chair, Wheelchair Transfer Minimal Assist   Mobility FWW   Skilled Intervention Verbal Cuing   6 Clicks Assessment - How much HELP from from another person do you currently need... (If the patient hasn't done an activity recently, how much help from another person do you think he/she would need if he/she tried?)   Turning from your back to your side while in a flat bed without using bedrails? 3   Moving from lying on your back to sitting on the side of a flat bed without using bedrails? 3   Moving to and from a bed to a chair (including a wheelchair)? 3   Standing up from a chair using your arms (e.g., wheelchair, or bedside chair)? 3   Walking in hospital room? 3   Climbing 3-5 steps with a railing? 2   6 clicks Mobility Score 17   Short Term Goals    Short Term Goal # 1 in 6 visits patient will demo all bed mobility indep from flat surface for safe DC   Goal Outcome # 1 Progressing as expected   Short Term Goal # 2 in 6 visits patient will demo all functional transfers with Sup and LRAD for safe DC   Goal Outcome # 2 Progressing as expected   Short Term Goal # 3 in 6 visits patient will ambualte 200' with Sup and LRAD for safe DC   Goal Outcome # 3 Progressing as expected   Physical Therapy Treatment Plan   Physical Therapy Treatment Plan Continue Current Treatment Plan   Anticipated Discharge Equipment and Recommendations   DC Equipment Recommendations Unable to determine at  this time   Discharge Recommendations Recommend post-acute placement for additional physical therapy services prior to discharge home   Interdisciplinary Plan of Care Collaboration   IDT Collaboration with  Nursing   Patient Position at End of Therapy In Bed;Bed Alarm On;Call Light within Reach;Tray Table within Reach;Phone within Reach   Collaboration Comments RN updated   Session Information   Date / Session Number  7/23- 2 (2/4, 7/27)

## 2025-07-23 NOTE — DISCHARGE PLANNING
Agency/Facility Name: [facility]  Plan or Request: KIRILL left vm regarding pending referral.    1019-   Agency/Facility Name: [facility]  Plan or Request: KIRILL left vm for Sybil, asked for a return call.    1144- Per epic link, [facility] declined    1155- Referral sent to outlying snfs

## 2025-07-23 NOTE — CARE PLAN
The patient is Stable - Low risk of patient condition declining or worsening    Shift Goals  Clinical Goals: Patient will remain free from falls and pain this shift.  Patient Goals: sleep  Family Goals: JUANCARLOS    Progress made toward(s) clinical / shift goals:  Patient has remained free from pain and falls this shift.    Patient is not progressing towards the following goals:

## 2025-07-24 ASSESSMENT — ENCOUNTER SYMPTOMS
FEVER: 0
VOMITING: 0
SENSORY CHANGE: 1
FOCAL WEAKNESS: 1
CHILLS: 0
ABDOMINAL PAIN: 0
NAUSEA: 0

## 2025-07-24 ASSESSMENT — PAIN DESCRIPTION - PAIN TYPE
TYPE: ACUTE PAIN
TYPE: ACUTE PAIN

## 2025-07-24 NOTE — DISCHARGE PLANNING
Agency/Facility Name: [facility]  Plan or Request: DPA left another vm for Sybil.    1028- Spoke To: Hailey  Agency/Facility Name: [facility]  Plan or Request: declined / insurance does not cover therapy    1245- Per Sybil at [facility], no male beds available.    1250- Per request, referral resent to [facility]    1530- Referral sent to [facility]

## 2025-07-24 NOTE — PROGRESS NOTES
[facility] Medicine Daily Progress Note    Date of Service  7/24/25    Chief Complaint  Norm Prabhakar is a 45 y.o. male admitted 7/20/2025 with possible stroke    Hospital Course  Mr. Norm Prabhakar is a 45 y.o. male with a pmh of DM type 1, seizure DO, conversion DO, PTSD, bipolar DO, hypothyroidism, s/p thyroidectomy, poor medication compliance who presented to the ED on 7/20/2025 with complaints of facial droop and weakness.     On examination, the patient tells me that he has been having very poor appetite since being started on it was epic three weeks ago. He is getting weaker, more fatigued and having difficulty walking due to his poor nutritional intake. This morning, when he took his medications, and ended up vomiting them up. He is also been having some diarrhea since starting was epic. Patient says his right sided weakness is worse than normal, but feels this is exacerbated his increasing fatigue and general weakness. I spoke with patient's  who tells me that she is the one that asked him to call 911 when he told her he was having some tingling in his arm. Patient denies any current numbness or tingling, difficulty with speech, difficulty with swallowing. He denies any other focal weakness, dizziness, lightheadedness. He denies  complaints.     Patient says he himself, takes the bus around town, is normal able to take care of himself.  Patient was evaluated by neurology while in the ED who feels patient symptoms are not CVA related, and recommends no additional neurological workup. Patient was hopeful to discharge from the ED; however, he reported ongoing bilateral lower extremity weakness. He felt using a cane may be helpful, so was provided one. Fortunately, patient was unable to ambulate well enough to be discharged home.     CBC unremarkable. CMP with glucose 274, sodium 132. Otherwise unremarkable. Troponin normal. A1c 13.7, which is improved from prior. CT head, CT and CT perfusion are  all unremarkable.  Patient admitted to hospital medicine for management of care.    On assessment, patient is back to his baseline, however, when evaluated by PT/OT, patient will likely need placement to rehab.  Patient's symptoms inconsistent with any neurological disorder however, patient does have a pretty significant history of conversion disorder.  While being evaluated, patient was unable to do assessment tasks however, when patient given a gown to change into, he was able to place himself in again with no difficulty, no falls, and no neurological deficits or weakness noted.       Interval Problem Update  - Patient seen and examined at bedside  - Sugars low again this morning, decreasing Lantus even more  - Worked with PT/OT again who still recommending postacute  - No acute concerns/complaints today      I have discussed this patient's plan of care and discharge plan at IDT rounds today with Case Management, Nursing, Nursing leadership, and other members of the IDT team.    Consultants/Specialty  Neuro-signed off    Code Status  Full Code    Disposition  The patient is medically cleared for discharge to home or a post-acute facility.  Anticipate discharge to: skilled nursing facility    I have placed the appropriate orders for post-discharge needs.    Review of Systems  Review of Systems   Constitutional:  Negative for chills and fever.   Gastrointestinal:  Negative for abdominal pain, nausea and vomiting.   Neurological:  Positive for sensory change and focal weakness (improving).        Physical Exam  Temp:  [36.4 °C (97.5 °F)-36.6 °C (97.8 °F)] 36.4 °C (97.5 °F)  Pulse:  [58-69] 69  Resp:  [16-17] 16  BP: ()/(56-65) 91/56  SpO2:  [89 %-93 %] 93 %    Physical Exam  Constitutional:       General: He is not in acute distress.     Appearance: He is not ill-appearing, toxic-appearing or diaphoretic.   HENT:      Head: Normocephalic and atraumatic.      Mouth/Throat:      Mouth: Mucous membranes are moist.    Eyes:      General: No scleral icterus.     Conjunctiva/sclera: Conjunctivae normal.   Cardiovascular:      Rate and Rhythm: Normal rate and regular rhythm.      Heart sounds: No murmur heard.     No friction rub. No gallop.   Pulmonary:      Effort: Pulmonary effort is normal.      Breath sounds: Normal breath sounds.   Abdominal:      General: Bowel sounds are normal. There is no distension.      Palpations: Abdomen is soft.      Tenderness: There is no abdominal tenderness.   Musculoskeletal:      Right lower leg: No edema.      Left lower leg: No edema.   Neurological:      Mental Status: He is alert and oriented to person, place, and time.      Motor: Weakness (right sided weakness improving) present.         Fluids    Intake/Output Summary (Last 24 hours) at 7/23/2025 1029  Last data filed at 7/23/2025 0948  Gross per 24 hour   Intake 580 ml   Output --   Net 580 ml        Laboratory  Recent Labs     07/20/25  1453 07/22/25  0134 07/23/25  0039   WBC 10.1 11.3* 8.1   RBC 4.75 4.18* 4.12*   HEMOGLOBIN 14.5 12.6* 12.6*   HEMATOCRIT 42.4 38.3* 36.8*   MCV 89.3 91.6 89.3   MCH 30.5 30.1 30.6   MCHC 34.2 32.9 34.2   RDW 44.2 44.6 44.4   PLATELETCT 351 282 275   MPV 10.8 10.8 10.6     Recent Labs     07/21/25  0025 07/22/25  0134 07/23/25  0039   SODIUM 133* 135 136   POTASSIUM 3.1* 3.4* 3.4*   CHLORIDE 99 100 102   CO2 19* 22 20   GLUCOSE 226* 110* 137*   BUN 17 19 16   CREATININE 0.94 1.17 1.14   CALCIUM 9.0 9.4 9.2     Recent Labs     07/20/25  1453   APTT 29.9   INR 0.95         Recent Labs     07/21/25  0025 07/22/25  0134   TRIGLYCERIDE 1078* 882*   HDL 32* 32*   LDL see below see below       Imaging  DX-CHEST-PORTABLE (1 VIEW)   Final Result      No evidence of acute cardiopulmonary process.      CT-CTA NECK WITH & W/O-POST PROCESSING   Final Result      1.  No evidence of carotid or vertebral arterial occlusion or dissection.      2.  Again seen multiple heterogeneous enhancing neck masses anteriorly the  "largest of which measures 3.7 x 3.0 cm in size. These are unchanged from comparison.      CT-CTA HEAD WITH & W/O-POST PROCESS   Final Result      CT angiogram of the [place] within normal limits.      CT-CEREBRAL PERFUSION ANALYSIS   Final Result      1. Cerebral blood flow less than 30% possibly representing completed infarct = 1 mL. Based on distribution of this finding, this is likely to represent artifact.      2. T Max more than 6 seconds possibly representing combination of completed infarct and ischemia = 16 mL. Based on the distribution of this finding, this is likely to represent artifact.      3. Mismatched volume possibly representing ischemic brain/penumbra= 15 mL      4.  Please note that this cerebral perfusion study and report is Quantitative and targets supratentorial (cerebral) perfusion for evaluation of large vessel territory acute ischemia/infarction. For example, lacunar infarcts, and brainstem/posterior fossa    ischemia/infarction are not evaluated on this study.  Data acquisition is subject to artifacts which can yield non-anatomically plausible perfusion maps which may be due to motion, bolus timing, signal to noise ratio, or other technical factors.    Perfusion map abnormalities which show non-anatomic distributions are likely artifact.   This study is not \"stand-alone\" and should only be utilized for diagnosis, management/treatment in correlation with CT, CTA, and/or MRI and clinical factors.         CT-HEAD W/O   Final Result      1.  No evidence of acute intracranial process.      2.  Again seen prominent diffuse white matter disease.                    Assessment/Plan  Conversion disorder- (present on admission)  Assessment & Plan  Significant history of  Usually has R weakness that improves, has had > 20 CT/mri  Well known to neuro service    7/23: weakness improving    Pseudohyponatremia- (present on admission)  Assessment & Plan  Na+132,   - IV fluids  - Glucose " control  - Drinks about 3 L of water per day due to psychotropic medications    Generalized weakness- (present on admission)  Assessment & Plan  Secondary to poor oral intake the last couple weeks since starting ozempic about 3 weeks ago  - PT/OT eval and treat  - encourage oral intake  - IV fluids  - encourage discussion of ozempic with his op providers   - UA  - Unlikely neurological based    Bipolar disorder (HCC)- (present on admission)  Assessment & Plan  - resume Buspar, Latuda, zoloft, naltrexone    Type 1 diabetes mellitus with hyperglycemia (HCC)- (present on admission)  Assessment & Plan  . Records are unclear regarding Lantus dosage. Patient says he takes Lantus 60 units TID, but records show BID. Note from endocrinologist are not specific on the exact amount (though it dose mention patient is on 120 units daily). A1c 13.7, which is down-trending for him  - Will give Lantus 40 units BID for now  - continue metformin   - accucheck ac/hs with med dose ssi coverage  - recommend strict DM (low carb) diet on DC    7/23: hypoglycemia today  - lantus decreased 35 units BID    7/24: Hypoglycemia again today  -Decrease Lantus to 32 units twice daily    Hypothyroidism- (present on admission)  Assessment & Plan  S/p thyroidectomy  - resume synthroid    TSH 80  -continue home synthroid endocrinologist who he sees outpatient  There has been concerns in the past about him not taking his medications, when I asked him if he is taking his Synthroid he said yes however then when I asked him how many times he is missed the medication in the last week or so he said 2-3 times  - Continue current Synthroid dose      Hypokalemia- (present on admission)  Assessment & Plan  K 3.4 again  Po supplementation    Seizure disorder (HCC)- (present on admission)  Assessment & Plan  - resume depakote    Other hyperlipidemia- (present on admission)  Assessment & Plan  - resume high dose statin, lofibra         VTE prophylaxis:     Xarelto 10mg daily as prophylaxis      I have performed a physical exam and reviewed and updated ROS and Plan today (7/23/2025). In review of yesterday's note (7/22/2025), there are no changes except as documented above.

## 2025-07-24 NOTE — PROGRESS NOTES
Received report and assumed care of patient at change of shift. Patient is A&Ox4, on RA, and reports no pain at this time. 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 Pt has R sided weakness, stated that he has had this during his hospitalization. BG is 74 Patient assessment completed, bed in lowest position, and call light and personal belongings are within reach. Patient expressed no further needs at this time. Plan for pt is placement.

## 2025-07-24 NOTE — DISCHARGE PLANNING
Case Management Discharge Planning    Admission Date: 7/20/2025  GMLOS:    ALOS: 0    6-Clicks ADL Score: 18  6-Clicks Mobility Score: 17  PT and/or OT Eval ordered: Yes  Post-acute Referrals Ordered: Yes  Post-acute Choice Obtained: Yes  Has referral(s) been sent to post-acute provider:  Yes    Anticipated Discharge Dispo: Discharge Disposition: D/T to SNF with Medicare cert in anticipation of skilled care (03)    DME Needed: No    Action(s) Taken: OTHER    SW asked DPA to expand referrals to [facility]. Unsure if patient will qualify for institutional medicaid. SW asked MD to potentially consult psych due to conversion disorder. Patient is medically cleared. Pending accepting facility.    Escalations Completed: Leadership    Medically Clear: Yes    Next Steps: F/U with medical team to discuss discharge needs and barriers.    Barriers to Discharge: No accepting placement, unsafe to discharge home at this moment.     Is the patient up for discharge tomorrow: No

## 2025-07-24 NOTE — CARE PLAN
The patient is Stable - Low risk of patient condition declining or worsening    Shift Goals  Clinical Goals: Patient will remain free from any falls or injury within the shift  Patient Goals: Rest, Comfort  Family Goals: JUANCARLOS    Progress made toward(s) clinical / shift goals:  Alert and oriented, vitally stable. No complaints of pain. Able to communicate needs, call light placed   Within easy reach.    Patient is not progressing towards the following goals:      Problem: Mobility - Stroke  Goal: Patient's capacity to carry out activities will improve  Description: Target End Date:  Prior to discharge or change in level of care    1.  Assess for barriers to mobility/activity  2.  Implement activity per interdisciplinary team recommendations  3.  Target activity level identified and patient/family/caregiver aware of goal  4.  Provide assistive devices  5.  Instruct patient/caregiver on proper use of assistive/adaptive devices  6.  Schedule activities and rest periods to decrease effects of fatigue  7.  Encourage mobilization to extent of ability  8.  Maintain proper body alignment  9.  Provide adequate pain management to allow progressive mobilization  Outcome: Not Progressing     Problem: Knowledge Deficit - Standard  Goal: Patient and family/care givers will demonstrate understanding of plan of care, disease process/condition, diagnostic tests and medications  Description: Target End Date:  1-3 days or as soon as patient condition allows    Document in Patient Education    1.  Patient and family/caregiver oriented to unit, equipment, visitation policy and means for communicating concern  2.  Complete/review Learning Assessment  3.  Assess knowledge level of disease process/condition, treatment plan, diagnostic tests and medications  4.  Explain disease process/condition, treatment plan, diagnostic tests and medications  Outcome: Not Progressing     Problem: Acute Care of the Diabetic Patient  Goal: Optimal Outcome  for the Diabetic Patient  Outcome: Not Progressing

## 2025-07-25 ASSESSMENT — COGNITIVE AND FUNCTIONAL STATUS - GENERAL
TOILETING: A LITTLE
SUGGESTED CMS G CODE MODIFIER MOBILITY: CK
CLIMB 3 TO 5 STEPS WITH RAILING: A LOT
TURNING FROM BACK TO SIDE WHILE IN FLAT BAD: A LITTLE
HELP NEEDED FOR BATHING: A LITTLE
DRESSING REGULAR LOWER BODY CLOTHING: A LITTLE
DRESSING REGULAR UPPER BODY CLOTHING: A LITTLE
MOBILITY SCORE: 17
WALKING IN HOSPITAL ROOM: A LITTLE
DAILY ACTIVITIY SCORE: 20
STANDING UP FROM CHAIR USING ARMS: A LITTLE
MOVING FROM LYING ON BACK TO SITTING ON SIDE OF FLAT BED: A LITTLE
MOVING TO AND FROM BED TO CHAIR: A LITTLE
SUGGESTED CMS G CODE MODIFIER DAILY ACTIVITY: CJ

## 2025-07-25 ASSESSMENT — ENCOUNTER SYMPTOMS
CHILLS: 0
ABDOMINAL PAIN: 0
VOMITING: 0
FOCAL WEAKNESS: 1
SENSORY CHANGE: 1
NAUSEA: 0
FEVER: 0

## 2025-07-25 ASSESSMENT — GAIT ASSESSMENTS
ASSISTIVE DEVICE: FRONT WHEEL WALKER
DEVIATION: BRADYKINETIC;SHUFFLED GAIT
GAIT LEVEL OF ASSIST: CONTACT GUARD ASSIST
DISTANCE (FEET): 10

## 2025-07-25 ASSESSMENT — PAIN DESCRIPTION - PAIN TYPE
TYPE: ACUTE PAIN

## 2025-07-25 NOTE — PROGRESS NOTES
[facility] Medicine Daily Progress Note    Date of Service  7/25/2025    Chief Complaint  Norm Prabhakar is a 45 y.o. male admitted 7/20/2025 with possible stroke    Hospital Course  Mr. Norm Prabhakar is a 45 y.o. male with a pmh of DM type 1, seizure DO, conversion DO, PTSD, bipolar DO, hypothyroidism, s/p thyroidectomy, poor medication compliance who presented to the ED on 7/20/2025 with complaints of facial droop and weakness.     On examination, the patient tells me that he has been having very poor appetite since being started on it was epic three weeks ago. He is getting weaker, more fatigued and having difficulty walking due to his poor nutritional intake. This morning, when he took his medications, and ended up vomiting them up. He is also been having some diarrhea since starting was epic. Patient says his right sided weakness is worse than normal, but feels this is exacerbated his increasing fatigue and general weakness. I spoke with patient's  who tells me that she is the one that asked him to call 911 when he told her he was having some tingling in his arm. Patient denies any current numbness or tingling, difficulty with speech, difficulty with swallowing. He denies any other focal weakness, dizziness, lightheadedness. He denies  complaints.     Patient says he himself, takes the bus around town, is normal able to take care of himself.  Patient was evaluated by neurology while in the ED who feels patient symptoms are not CVA related, and recommends no additional neurological workup. Patient was hopeful to discharge from the ED; however, he reported ongoing bilateral lower extremity weakness. He felt using a cane may be helpful, so was provided one. Fortunately, patient was unable to ambulate well enough to be discharged home.     CBC unremarkable. CMP with glucose 274, sodium 132. Otherwise unremarkable. Troponin normal. A1c 13.7, which is improved from prior. CT head, CT and CT perfusion  are all unremarkable.  Patient admitted to hospital medicine for management of care.    On assessment, patient is back to his baseline, however, when evaluated by PT/OT, patient will likely need placement to rehab.  Patient's symptoms inconsistent with any neurological disorder however, patient does have a pretty significant history of conversion disorder.  While being evaluated, patient was unable to do assessment tasks however, when patient given a gown to change into, he was able to place himself in again with no difficulty, no falls, and no neurological deficits or weakness noted.       Interval Problem Update  - Patient seen and examined at bedside  - Patient working with OT today able to walk with a walker, only able to walk 10 feet but seems to be improving, motivated to go home  - Sugars improved  - Creatinine did bump today 1.1-->1.5      I have discussed this patient's plan of care and discharge plan at IDT rounds today with Case Management, Nursing, Nursing leadership, and other members of the IDT team.    Consultants/Specialty  Neuro-signed off    Code Status  Full Code    Disposition  The patient is medically cleared for discharge to home or a post-acute facility.  Anticipate discharge to: skilled nursing facility    I have placed the appropriate orders for post-discharge needs.    Review of Systems  Review of Systems   Constitutional:  Negative for chills and fever.   Gastrointestinal:  Negative for abdominal pain, nausea and vomiting.   Neurological:  Positive for sensory change and focal weakness (improving).        Physical Exam  Temp:  [35.9 °C (96.6 °F)-37.1 °C (98.8 °F)] 35.9 °C (96.6 °F)  Pulse:  [60-72] 60  Resp:  [15-18] 15  BP: ()/(57-67) 102/59  SpO2:  [92 %-99 %] 92 %    Physical Exam  Constitutional:       General: He is not in acute distress.     Appearance: He is not ill-appearing, toxic-appearing or diaphoretic.   HENT:      Head: Normocephalic and atraumatic.      Mouth/Throat:       Mouth: Mucous membranes are moist.   Eyes:      General: No scleral icterus.     Conjunctiva/sclera: Conjunctivae normal.   Cardiovascular:      Rate and Rhythm: Normal rate and regular rhythm.      Heart sounds: No murmur heard.     No friction rub. No gallop.   Pulmonary:      Effort: Pulmonary effort is normal.      Breath sounds: Normal breath sounds.   Abdominal:      General: Bowel sounds are normal. There is no distension.      Palpations: Abdomen is soft.      Tenderness: There is no abdominal tenderness.   Musculoskeletal:      Right lower leg: No edema.      Left lower leg: No edema.   Neurological:      Mental Status: He is alert and oriented to person, place, and time.      Motor: Weakness (right sided weakness improving) present.         Fluids    Intake/Output Summary (Last 24 hours) at 7/25/2025 1355  Last data filed at 7/25/2025 1200  Gross per 24 hour   Intake 516 ml   Output 2130 ml   Net -1614 ml        Laboratory  Recent Labs     07/23/25  0039   WBC 8.1   RBC 4.12*   HEMOGLOBIN 12.6*   HEMATOCRIT 36.8*   MCV 89.3   MCH 30.6   MCHC 34.2   RDW 44.4   PLATELETCT 275   MPV 10.6     Recent Labs     07/23/25  0039 07/25/25  0035   SODIUM 136 138   POTASSIUM 3.4* 3.6   CHLORIDE 102 96   CO2 20 27   GLUCOSE 137* 104*   BUN 16 17   CREATININE 1.14 1.53*   CALCIUM 9.2 10.2                       Imaging  DX-CHEST-PORTABLE (1 VIEW)   Final Result      No evidence of acute cardiopulmonary process.      CT-CTA NECK WITH & W/O-POST PROCESSING   Final Result      1.  No evidence of carotid or vertebral arterial occlusion or dissection.      2.  Again seen multiple heterogeneous enhancing neck masses anteriorly the largest of which measures 3.7 x 3.0 cm in size. These are unchanged from comparison.      CT-CTA HEAD WITH & W/O-POST PROCESS   Final Result      CT angiogram of the [place] within normal limits.      CT-CEREBRAL PERFUSION ANALYSIS   Final Result      1. Cerebral blood flow less than 30%  "possibly representing completed infarct = 1 mL. Based on distribution of this finding, this is likely to represent artifact.      2. T Max more than 6 seconds possibly representing combination of completed infarct and ischemia = 16 mL. Based on the distribution of this finding, this is likely to represent artifact.      3. Mismatched volume possibly representing ischemic brain/penumbra= 15 mL      4.  Please note that this cerebral perfusion study and report is Quantitative and targets supratentorial (cerebral) perfusion for evaluation of large vessel territory acute ischemia/infarction. For example, lacunar infarcts, and brainstem/posterior fossa    ischemia/infarction are not evaluated on this study.  Data acquisition is subject to artifacts which can yield non-anatomically plausible perfusion maps which may be due to motion, bolus timing, signal to noise ratio, or other technical factors.    Perfusion map abnormalities which show non-anatomic distributions are likely artifact.   This study is not \"stand-alone\" and should only be utilized for diagnosis, management/treatment in correlation with CT, CTA, and/or MRI and clinical factors.         CT-HEAD W/O   Final Result      1.  No evidence of acute intracranial process.      2.  Again seen prominent diffuse white matter disease.                    Assessment/Plan  Conversion disorder- (present on admission)  Assessment & Plan  Significant history of  Usually has R weakness that improves, has had > 20 CT/mri  Well known to neuro service    7/23: weakness improving    Pseudohyponatremia- (present on admission)  Assessment & Plan  Resolved    Generalized weakness- (present on admission)  Assessment & Plan  Secondary to poor oral intake the last couple weeks since starting ozempic about 3 weeks ago  - PT/OT eval and treat  - encourage oral intake  - IV fluids  - encourage discussion of ozempic with his op providers   - UA  - Unlikely neurological based    Bipolar " disorder (HCC)- (present on admission)  Assessment & Plan  - resume Buspar, Latuda, zoloft, naltrexone    Type 1 diabetes mellitus with hyperglycemia (HCC)- (present on admission)  Assessment & Plan  . Records are unclear regarding Lantus dosage. Patient says he takes Lantus 60 units TID, but records show BID. Note from endocrinologist are not specific on the exact amount (though it dose mention patient is on 120 units daily). A1c 13.7, which is down-trending for him  - Will give Lantus 40 units BID for now  - continue metformin   - accucheck ac/hs with med dose ssi coverage  - recommend strict DM (low carb) diet on DC    7/23: hypoglycemia today  - lantus decreased 35 units BID    7/24: Hypoglycemia again today  -Decrease Lantus to 32 units twice daily     7/25: Sugars improved today    Hypothyroidism- (present on admission)  Assessment & Plan  S/p thyroidectomy  - resume synthroid    TSH 80  -continue home synthroid endocrinologist who he sees outpatient  There has been concerns in the past about him not taking his medications, when I asked him if he is taking his Synthroid he said yes however then when I asked him how many times he is missed the medication in the last week or so he said 2-3 times  - Continue current Synthroid dose      Hypokalemia- (present on admission)  Assessment & Plan  Resolved    Seizure disorder (HCC)- (present on admission)  Assessment & Plan  - resume depakote    ANA (acute kidney injury) (HCC)- (present on admission)  Assessment & Plan  Unsure etiology  Did receive contrast on admission when getting stroke rule out  Very good urine output, 3 L yesterday  Repeat BMP tomorrow    Other hyperlipidemia- (present on admission)  Assessment & Plan  - resume high dose statin, lofibra         VTE prophylaxis:    Xarelto 10mg daily as prophylaxis      I have performed a physical exam and reviewed and updated ROS and Plan today (7/25/2025). In review of yesterday's note (7/24/2025), there are  no changes except as documented above.

## 2025-07-25 NOTE — PROGRESS NOTES
Received report and assumed care of patient at change of shift. Patient is A&Ox4, on RA, and reports no pain at this time. 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 Pt has R sided weakness, stated that he has had this during his hospitalization. BG is 103  Patient assessment completed, bed in lowest position, and call light and personal belongings are within reach. Patient expressed no further needs at this time. Plan for pt is placement.

## 2025-07-25 NOTE — CARE PLAN
The patient is Stable - Low risk of patient condition declining or worsening    Shift Goals  Clinical Goals: Remain free from fall; Monitor blood sugar level  Patient Goals: Rest  Family Goals: JUANCARLOS    Progress made toward(s) clinical / shift goals:    Blood glucose level monitored.    Problem: Pain - Standard  Goal: Alleviation of pain or a reduction in pain to the patient’s comfort goal  Outcome: Progressing  Note: Patient's pain managed through pharmacologic and non-pharmacologic measures. No reports of new onset of pain thus far this shift. Is able to rest comfortably through the night. Educated to report any onset of pain. Patient's pain level will continually be monitored for the rest of the shift.     Problem: Fall Risk  Goal: Patient will remain free from falls  Outcome: Progressing  Note: Safety education provided; patient verbalized understanding. Bed is placed on low position with bed alarm on; care items within reach; emphasized to use the call button to seek assistance. Hourly rounding done. Remains free from thus far this shift. Monitoring to continue for the rest of the shift.       Patient is not progressing towards the following goals:

## 2025-07-25 NOTE — PROGRESS NOTES
Assumed care of patient at 1900. Received Report from [facility]. Patient A&Ox4, on room air and not on apparent distress. Reporting a pain level of 0 at this time. PIV on left forearm checked- no signs of phlebitis.  Call light within reach, fall prevention education given, belongings within reach, bed alarm is on and bed in lowest position. All questions answered, plan of care discussed and pt verbalized understanding.

## 2025-07-25 NOTE — CARE PLAN
The patient is Stable - Low risk of patient condition declining or worsening    Shift Goals  Clinical Goals: Pt will remain free from falls and manage BG during shift  Patient Goals: Pt wants to rest and be updated with POC  Family Goals: JUANCARLOS    Progress made toward(s) clinical / shift goals:        Problem: Pain - Standard  Goal: Alleviation of pain or a reduction in pain to the patient’s comfort goal  Outcome: Progressing  Note: Pt pain remained at baseline and did not require interventions during shift.     Problem: Skin Integrity  Goal: Skin integrity is maintained or improved  Outcome: Progressing  Note: Pt skin integrity remained intact during shift and turned self during shift.     Problem: Fall Risk  Goal: Patient will remain free from falls  Outcome: Progressing  Note: Pt remained free from falls during shift.       Patient is not progressing towards the following goals:

## 2025-07-25 NOTE — DISCHARGE PLANNING
Agency/Facility Name: [facility]  Plan or Request: DPA left vm for Paulette, asked for a return call.    1345-   Agency/Facility Name: [facility]  Plan or Request: DPA left vm for Omer regarding pending referral.    1352-  Agency/Facility Name: [facility]  Plan or Request: DPA attempted to leave vm for Ashlie, vm full.     1400- Spoke To: Luis  Agency/Facility Name: [facility]  Plan or Request: declined / no medicaid beds    1405- Spoke To: Sarah  Agency/Facility Name: [facility]  Plan or Request: declined    1410-   Agency/Facility Name: [facility]  Plan or Request: DPA left vm asked for a return call.    1412-   Agency/Facility Name: [facility] [place]  Plan or Request: DPA left vm regarding referral.    1415- Spoke To: Heike  Agency/Facility Name: [facility] [place]  Plan or Request: declined / non contracted insurance

## 2025-07-25 NOTE — THERAPY
Occupational Therapy  Daily Treatment     Patient Name:  Norm Prabhakar  Age:  45 y.o., Sex:  male  Medical Record #:  [id number]  Today's Date:  7/25/2025         Assessment    Pt currently limited by decreased functional mobility, activity tolerance, cognition, strength, AROM, coordination, balance, which are affecting pt's ability to complete ADLs/IADLs at baseline. Pt would benefit from OT services in the acute care setting to maximize functional recovery.      Plan    Treatment Plan Status: (P) Continue Current Treatment Plan  Type of Treatment: Self Care / Activities of Daily Living, Adaptive Equipment, Neuro Re-Education / Balance, Therapeutic Exercises, Therapeutic Activity, Family / Caregiver Training  Treatment Frequency: 4 Times per Week  Treatment Duration: Until Therapy Goals Met    DC Equipment Recommendations: Unable to determine at this time  Discharge Recommendations: (P) Recommend post-acute placement for additional occupational therapy services prior to discharge home (could go home if he has supervision and physical assist avalible)       07/25/25 1205   Passive ROM Upper Body   Passive ROM Upper Body WDL   Strength Upper Body   Upper Body Strength  X   Comments R UE 2/5   Balance   Sitting Balance (Static) Fair +   Sitting Balance (Dynamic) Fair   Standing Balance (Static) Fair -   Standing Balance (Dynamic) Poor +  (with FWW)   Weight Shift Sitting Fair   Weight Shift Standing Poor   Activities of Daily Living   Grooming Standby Assist   Lower Body Dressing Minimal Assist   Functional Mobility   Sit to Stand Minimal Assist   Bed, Chair, Wheelchair Transfer Minimal Assist   Short Term Goals   Short Term Goal # 1 pt will demo seated grooming incorporating BUE w/ supv   Goal Outcome # 1 Progressing as expected   Short Term Goal # 2 pt will demo LB dressing w/ supv   Goal Outcome # 2 Progressing as expected   Short Term Goal # 3 pt will demo toileting including clothing mgmt w/ supv   Goal Outcome #  3 Progressing as expected   Occupational Therapy Treatment Plan    O.T. Treatment Plan Continue Current Treatment Plan   Anticipated Discharge Equipment and Recommendations   Discharge Recommendations Recommend post-acute placement for additional occupational therapy services prior to discharge home  (could go home if he has supervision and physical assist avalible)

## 2025-07-25 NOTE — THERAPY
Physical Therapy   Daily Treatment     Patient Name:  Norm Prabhakar  Age:  45 y.o., Sex:  male  Medical Record #:  [id number]  Today's Date: 7/25/2025     Precautions  Medical: Fall Risk    Assessment    PT saw pt for a follow up PT treatment. He demonstrated improvement in R UE and LE strength and AROM. Pt was able to utilize his R UE to assist during bed mobility, STS and was able to hold the walker during ambulation. He required SBA for all activities. Limited ambulation distance due to reduced functional endurance. PT will continue to follow pt while his stay.       Plan    Treatment Plan Status: Continue Current Treatment Plan  Type of Treatment: Bed Mobility, Equipment, Gait Training, Neuro Re-Education / Balance, Self Care / Home Evaluation, Stair Training, Therapeutic Exercise, Therapeutic Activities, Family / Caregiver Training  Treatment Frequency: 4 Times per Week  Treatment Duration: Until Therapy Goals Met    DC Equipment Recommendations: Unable to determine at this time  Discharge Recommendations: Recommend post-acute placement for additional physical therapy services prior to discharge home    Objective       07/25/25 1537   Pain 0 - 10 Group   Therapist Pain Assessment 0;Post Activity Pain Same as Prior to Activity   Cognition    Cognition / Consciousness X   Speech/ Communication Hard of Hearing;Delayed Responses   Level of Consciousness Alert   Sequencing Impaired   Comments pleasant and cooperative, chatty   Balance   Sitting Balance (Static) Fair +   Sitting Balance (Dynamic) Fair   Standing Balance (Static) Fair   Standing Balance (Dynamic) Fair -   Weight Shift Sitting Fair   Weight Shift Standing Fair   Skilled Intervention Tactile Cuing;Verbal Cuing;Sequencing   Comments w/ FWW   Bed Mobility    Supine to Sit Standby Assist   Sit to Supine Standby Assist   Scooting Standby Assist   Skilled Intervention Verbal Cuing;Sequencing   Comments HOB slighly elevated, minimally utilized RUE   Gait  Analysis   Gait Level Of Assist Contact Guard Assist   Assistive Device Front Wheel Walker   Distance (Feet) 10   # of Times Distance was Traveled 2   Deviation Bradykinetic;Shuffled Gait   Skilled Intervention Verbal Cuing   Functional Mobility   Sit to Stand Standby Assist   Bed, Chair, Wheelchair Transfer Standby Assist   Transfer Method Stand Step   Mobility bed> EOB> ambulate in room> BTB   Skilled Intervention Verbal Cuing;Tactile Cuing   6 Clicks Assessment - How much HELP from from another person do you currently need... (If the patient hasn't done an activity recently, how much help from another person do you think he/she would need if he/she tried?)   Turning from your back to your side while in a flat bed without using bedrails? 3   Moving from lying on your back to sitting on the side of a flat bed without using bedrails? 3   Moving to and from a bed to a chair (including a wheelchair)? 3   Standing up from a chair using your arms (e.g., wheelchair, or bedside chair)? 3   Walking in hospital room? 3   Climbing 3-5 steps with a railing? 2   6 clicks Mobility Score 17   Activity Tolerance   Sitting Edge of Bed 6 min   Standing 4 min   Short Term Goals    Short Term Goal # 1 in 6 visits patient will demo all bed mobility indep from flat surface for safe DC   Goal Outcome # 1 Progressing as expected   Short Term Goal # 2 in 6 visits patient will demo all functional transfers with Sup and LRAD for safe DC   Goal Outcome # 2 Progressing as expected   Short Term Goal # 3 in 6 visits patient will ambualte 200' with Sup and LRAD for safe DC   Goal Outcome # 3 Progressing as expected   Education Group   Education Provided Role of Physical Therapist;Gait Training;Use of Assistive Device   Role of Physical Therapist Patient Response Patient;Acceptance;Explanation;Demonstration;Verbal Demonstration;Action Demonstration   Gait Training Patient Response Patient;Acceptance;Explanation;Demonstration;Verbal  Demonstration;Action Demonstration   Use of Assistive Device Patient Response Patient;Acceptance;Explanation;Demonstration;Verbal Demonstration;Action Demonstration   Physical Therapy Treatment Plan   Physical Therapy Treatment Plan Continue Current Treatment Plan   Anticipated Discharge Equipment and Recommendations   DC Equipment Recommendations Unable to determine at this time   Discharge Recommendations Recommend post-acute placement for additional physical therapy services prior to discharge home   Interdisciplinary Plan of Care Collaboration   IDT Collaboration with  Nursing   Patient Position at End of Therapy In Bed;Bed Alarm On;Call Light within Reach;Tray Table within Reach;Phone within Reach   Collaboration Comments RN aware   Session Information   Date / Session Number  7/25-3(3/4, 7/27)   Priority   (Rehab in Place)

## 2025-07-25 NOTE — CARE PLAN
The patient is Stable - Low risk of patient condition declining or worsening    Shift Goals  Clinical Goals: Pt will remain free from falls and manage BG  Patient Goals: Pt wants to rest and be updated with POC  Family Goals: JUANCARLOS    Progress made toward(s) clinical / shift goals:        Problem: Pain - Standard  Goal: Alleviation of pain or a reduction in pain to the patient’s comfort goal  Outcome: Progressing  Note: Pt pain remained at baseline and did not require interventions during shift.     Problem: Knowledge Deficit - Standard  Goal: Patient and family/care givers will demonstrate understanding of plan of care, disease process/condition, diagnostic tests and medications  Outcome: Progressing  Note: Pt updated with POC and expressed their understanding with verbal acknowledgment.      Problem: Skin Integrity  Goal: Skin integrity is maintained or improved  Outcome: Progressing  Note: Pt skin integrity remained intact and ambulated to the chair during shift.     Problem: Fall Risk  Goal: Patient will remain free from falls  Outcome: Progressing  Note: Pt remained free from falls during shift.       Patient is not progressing towards the following goals:

## 2025-07-26 ASSESSMENT — ENCOUNTER SYMPTOMS
NAUSEA: 0
ABDOMINAL PAIN: 0
SHORTNESS OF BREATH: 0
FEVER: 0
CHILLS: 0
VOMITING: 0
FOCAL WEAKNESS: 1

## 2025-07-26 ASSESSMENT — PAIN DESCRIPTION - PAIN TYPE
TYPE: ACUTE PAIN
TYPE: ACUTE PAIN

## 2025-07-26 NOTE — CARE PLAN
The patient is Stable - Low risk of patient condition declining or worsening    Shift Goals  Clinical Goals: Remain free from fall; Monitor blood glucose level  Patient Goals: Rest  Family Goals: JUANCARLOS    Progress made toward(s) clinical / shift goals:      Problem: Pain - Standard  Goal: Alleviation of pain or a reduction in pain to the patient’s comfort goal  Outcome: Progressing  Note: Patient's pain managed through pharmacologic and non-pharmacologic measures. No reports of new onset of pain thus far this shift. Is able to rest comfortably through the night. Educated to report any onset of pain. Patient's pain level will continually be monitored for the rest of the shift.     Problem: Fall Risk  Goal: Patient will remain free from falls  Outcome: Progressing  Note: Safety education provided; patient verbalized understanding. Bed is placed on low position with bed alarm on; care items within reach; emphasized to use the call button to seek assistance. Hourly rounding done. Remains free from thus far this shift. Monitoring to continue for the rest of the shift.     Blood glucose level taken and recorded.    Patient is not progressing towards the following goals:

## 2025-07-26 NOTE — PROGRESS NOTES
[facility] Medicine Daily Progress Note    Date of Service  7/26/2025    Chief Complaint  Norm Prabhakar is a 45 y.o. male admitted 7/20/2025 with possible stroke    Hospital Course  Mr. Norm Prabhakar is a 45 y.o. male with a pmh of DM type 1, seizure DO, conversion DO, PTSD, bipolar DO, hypothyroidism, s/p thyroidectomy, poor medication compliance who presented to the ED on 7/20/2025 with complaints of facial droop and weakness.     On examination, the patient tells me that he has been having very poor appetite since being started on it was epic three weeks ago. He is getting weaker, more fatigued and having difficulty walking due to his poor nutritional intake. This morning, when he took his medications, and ended up vomiting them up. He is also been having some diarrhea since starting was epic. Patient says his right sided weakness is worse than normal, but feels this is exacerbated his increasing fatigue and general weakness. I spoke with patient's  who tells me that she is the one that asked him to call 911 when he told her he was having some tingling in his arm. Patient denies any current numbness or tingling, difficulty with speech, difficulty with swallowing. He denies any other focal weakness, dizziness, lightheadedness. He denies  complaints.     Patient says he himself, takes the bus around town, is normal able to take care of himself.  Patient was evaluated by neurology while in the ED who feels patient symptoms are not CVA related, and recommends no additional neurological workup. Patient was hopeful to discharge from the ED; however, he reported ongoing bilateral lower extremity weakness. He felt using a cane may be helpful, so was provided one. Fortunately, patient was unable to ambulate well enough to be discharged home.     CBC unremarkable. CMP with glucose 274, sodium 132. Otherwise unremarkable. Troponin normal. A1c 13.7, which is improved from prior. CT head, CT and CT perfusion  are all unremarkable.  Patient admitted to hospital medicine for management of care.    On assessment, patient is back to his baseline, however, when evaluated by PT/OT, patient will likely need placement to rehab.  Patient's symptoms inconsistent with any neurological disorder however, patient does have a pretty significant history of conversion disorder.  While being evaluated, patient was unable to do assessment tasks however, when patient given a gown to change into, he was able to place himself in again with no difficulty, no falls, and no neurological deficits or weakness noted.       Interval Problem Update  Patient seen and examined at bedside  Patient reports feeling significantly improved, strength improving, will have patient walk again with nursing and/or PT      I have discussed this patient's plan of care and discharge plan at IDT rounds today with Case Management, Nursing, Nursing leadership, and other members of the IDT team.    Consultants/Specialty  Neuro-signed off    Code Status  Full Code    Disposition  The patient is medically cleared for discharge to home or a post-acute facility.  Anticipate discharge to: skilled nursing facility    I have placed the appropriate orders for post-discharge needs.    Review of Systems  Review of Systems   Constitutional:  Negative for chills and fever.   Respiratory:  Negative for shortness of breath.    Cardiovascular:  Negative for chest pain.   Gastrointestinal:  Negative for abdominal pain, nausea and vomiting.   Neurological:  Positive for focal weakness (improving).        Physical Exam  Temp:  [36.1 °C (97 °F)-37 °C (98.6 °F)] 36.1 °C (97 °F)  Pulse:  [61-71] 71  Resp:  [15-17] 17  BP: ()/(66-71) 98/69  SpO2:  [94 %-97 %] 97 %    Physical Exam  Constitutional:       General: He is not in acute distress.     Appearance: He is not ill-appearing, toxic-appearing or diaphoretic.   HENT:      Head: Normocephalic and atraumatic.      Mouth/Throat:       Mouth: Mucous membranes are moist.   Eyes:      General: No scleral icterus.     Conjunctiva/sclera: Conjunctivae normal.   Cardiovascular:      Rate and Rhythm: Normal rate and regular rhythm.      Heart sounds: No murmur heard.     No friction rub. No gallop.   Pulmonary:      Effort: Pulmonary effort is normal.      Breath sounds: Normal breath sounds.   Abdominal:      General: Bowel sounds are normal. There is no distension.      Palpations: Abdomen is soft.      Tenderness: There is no abdominal tenderness.   Musculoskeletal:      Right lower leg: No edema.      Left lower leg: No edema.   Neurological:      Mental Status: He is alert and oriented to person, place, and time.      Motor: No weakness.         Fluids    Intake/Output Summary (Last 24 hours) at 7/26/2025 1400  Last data filed at 7/26/2025 0800  Gross per 24 hour   Intake 680 ml   Output 1640 ml   Net -960 ml        Laboratory        Recent Labs     07/25/25  0035 07/26/25  0705   SODIUM 138 137   POTASSIUM 3.6 4.0   CHLORIDE 96 98   CO2 27 25   GLUCOSE 104* 109*   BUN 17 22   CREATININE 1.53* 1.31   CALCIUM 10.2 10.1                       Imaging  DX-CHEST-PORTABLE (1 VIEW)   Final Result      No evidence of acute cardiopulmonary process.      CT-CTA NECK WITH & W/O-POST PROCESSING   Final Result      1.  No evidence of carotid or vertebral arterial occlusion or dissection.      2.  Again seen multiple heterogeneous enhancing neck masses anteriorly the largest of which measures 3.7 x 3.0 cm in size. These are unchanged from comparison.      CT-CTA HEAD WITH & W/O-POST PROCESS   Final Result      CT angiogram of the [place] within normal limits.      CT-CEREBRAL PERFUSION ANALYSIS   Final Result      1. Cerebral blood flow less than 30% possibly representing completed infarct = 1 mL. Based on distribution of this finding, this is likely to represent artifact.      2. T Max more than 6 seconds possibly representing combination of completed  "infarct and ischemia = 16 mL. Based on the distribution of this finding, this is likely to represent artifact.      3. Mismatched volume possibly representing ischemic brain/penumbra= 15 mL      4.  Please note that this cerebral perfusion study and report is Quantitative and targets supratentorial (cerebral) perfusion for evaluation of large vessel territory acute ischemia/infarction. For example, lacunar infarcts, and brainstem/posterior fossa    ischemia/infarction are not evaluated on this study.  Data acquisition is subject to artifacts which can yield non-anatomically plausible perfusion maps which may be due to motion, bolus timing, signal to noise ratio, or other technical factors.    Perfusion map abnormalities which show non-anatomic distributions are likely artifact.   This study is not \"stand-alone\" and should only be utilized for diagnosis, management/treatment in correlation with CT, CTA, and/or MRI and clinical factors.         CT-HEAD W/O   Final Result      1.  No evidence of acute intracranial process.      2.  Again seen prominent diffuse white matter disease.                    Assessment/Plan  Conversion disorder- (present on admission)  Assessment & Plan  Significant history of  Usually has R weakness that improves, has had > 20 CT/mri  Well known to neuro service    7/23: weakness improving    7/26: Significantly improving will have patient work with PT again tomorrow likely home    Pseudohyponatremia- (present on admission)  Assessment & Plan  Resolved    Generalized weakness- (present on admission)  Assessment & Plan  Secondary to poor oral intake the last couple weeks since starting ozempic about 3 weeks ago  - PT/OT eval and treat  - encourage oral intake  - IV fluids  - encourage discussion of ozempic with his op providers   - UA  - Unlikely neurological based    Bipolar disorder (HCC)- (present on admission)  Assessment & Plan  - resume Buspar, Latuda, zoloft, naltrexone    Type 1 " diabetes mellitus with hyperglycemia (HCC)- (present on admission)  Assessment & Plan  . Records are unclear regarding Lantus dosage. Patient says he takes Lantus 60 units TID, but records show BID. Note from endocrinologist are not specific on the exact amount (though it dose mention patient is on 120 units daily). A1c 13.7, which is down-trending for him  - Will give Lantus 40 units BID for now  - continue metformin   - accucheck ac/hs with med dose ssi coverage  - recommend strict DM (low carb) diet on DC    7/23: hypoglycemia today  - lantus decreased 35 units BID    7/24: Hypoglycemia again today  -Decrease Lantus to 32 units twice daily     7/25: Sugars improved today    7/26: Sugars stable    Hypothyroidism- (present on admission)  Assessment & Plan  S/p thyroidectomy  - resume synthroid    TSH 80  -continue home synthroid endocrinologist who he sees outpatient  There has been concerns in the past about him not taking his medications, when I asked him if he is taking his Synthroid he said yes however then when I asked him how many times he is missed the medication in the last week or so he said 2-3 times  - Continue current Synthroid dose      Hypokalemia- (present on admission)  Assessment & Plan  Resolved    Seizure disorder (HCC)- (present on admission)  Assessment & Plan  - resume depakote    ANA (acute kidney injury) (HCC)- (present on admission)  Assessment & Plan  Unsure etiology  Did receive contrast on admission when getting stroke rule out  Very good urine output, 3 L yesterday  Repeat BMP tomorrow    7/26: Creatinine improved to 1.3    Other hyperlipidemia- (present on admission)  Assessment & Plan  - resume high dose statin, lofibra         VTE prophylaxis:    Xarelto 10mg daily as prophylaxis      I have performed a physical exam and reviewed and updated ROS and Plan today (7/26/2025). In review of yesterday's note (7/25/2025), there are no changes except as documented above.

## 2025-07-26 NOTE — PROGRESS NOTES
Received report and assumed care of patient at change of shift. Patient is A&Ox4, on RA, and reports no pain at this time. Pt up to chair for breakfast. Patient assessment completed, bed in lowest position, and call light and personal belongings are within reach. Patient expressed no further needs at this time.

## 2025-07-26 NOTE — CARE PLAN
The patient is Stable - Low risk of patient condition declining or worsening    Shift Goals  Clinical Goals: Pt will remain free from falls and manage BG  Patient Goals: Pt wants to rest and be updated with POC  Family Goals: JUANCARLOS    Progress made toward(s) clinical / shift goals:        Problem: Pain - Standard  Goal: Alleviation of pain or a reduction in pain to the patient’s comfort goal  Outcome: Progressing  Note: Pt pain remained at baseline and did not increase during shift.     Problem: Knowledge Deficit - Standard  Goal: Patient and family/care givers will demonstrate understanding of plan of care, disease process/condition, diagnostic tests and medications  Outcome: Progressing  Note: Pt updated with POC and expressed their understanding with verbal acknowledgement.     Problem: Fall Risk  Goal: Patient will remain free from falls  Outcome: Progressing  Note: Pt remained free from falls during shift.       Patient is not progressing towards the following goals:

## 2025-07-27 ENCOUNTER — PHARMACY VISIT (OUTPATIENT)
Dept: PHARMACY | Facility: MEDICAL CENTER | Age: 45
End: 2025-07-27
Payer: COMMERCIAL

## 2025-07-27 PROBLEM — E87.6 HYPOKALEMIA: Status: RESOLVED | Noted: 2021-02-04 | Resolved: 2025-07-27

## 2025-07-27 PROBLEM — N17.9 AKI (ACUTE KIDNEY INJURY) (HCC): Status: RESOLVED | Noted: 2020-03-01 | Resolved: 2025-07-27

## 2025-07-27 PROCEDURE — RXMED WILLOW AMBULATORY MEDICATION CHARGE: Performed by: INTERNAL MEDICINE

## 2025-07-27 ASSESSMENT — GAIT ASSESSMENTS
GAIT LEVEL OF ASSIST: SUPERVISED
DEVIATION: BRADYKINETIC;DECREASED HEEL STRIKE;DECREASED TOE OFF
DISTANCE (FEET): 300
ASSISTIVE DEVICE: FRONT WHEEL WALKER

## 2025-07-27 ASSESSMENT — PAIN DESCRIPTION - PAIN TYPE
TYPE: ACUTE PAIN
TYPE: ACUTE PAIN

## 2025-07-27 NOTE — PROGRESS NOTES
"Assumed care of patient at 1900 from day RN. Patient is A&O x 4. Bed locked in the lowest position, 2 side rails up, call light is within reach, belongings at bedside. Pt reports pain level of 0 /10. Mobility standby assist with a FWW. Explained plan of care and safety precautions to pt, pt has no questions at this time. Hourly rounding is in place.   /59   Pulse 62   Temp 36.7 °C (98.1 °F) (Temporal)   Resp 13   Ht 1.981 m (6' 6\")   Wt 110 kg (243 lb 2.7 oz)   SpO2 96%   BMI 28.10 kg/m²   "

## 2025-07-27 NOTE — DISCHARGE PLANNING
Case Management Discharge Planning    Admission Date: 7/20/2025  GMLOS:    ALOS: 0    6-Clicks ADL Score: 20  6-Clicks Mobility Score: 17  PT and/or OT Eval ordered: Yes  Post-acute Referrals Ordered: Yes  Post-acute Choice Obtained: Yes  Has referral(s) been sent to post-acute provider:  Yes      Anticipated Discharge Dispo: Discharge Disposition: D/T to SNF with Medicare cert in anticipation of skilled care (03)    DME Needed: No    Action(s) Taken: LMSW met was updated by nursing that the patient is medically cleared to DC at this time and will need transport home. Patient confirmed address, transport requested.     Addendum @ 1442: LMSW received an update that the patient was not ready to discharge. Per Abdias, a new order is needed for the 1500 timeline. LMSW placed a new order. MD ordered HH. HH choice scanned to DPA. LMSW informed IDT that the patient is unlikely to receive HH due to insurance and would benefit from OP PT/OT referrals.     Escalations Completed: None    Medically Clear: Yes    Next Steps: Patient to discharge home.     Barriers to Discharge: None    Is the patient up for discharge tomorrow: No

## 2025-07-27 NOTE — PROGRESS NOTES
Pt discharged from floor home. PIV removed. Discharge instructions reviewed and Pt has no additional questions at this time. Meds to beds delivered to Pt. Pt dressed and toileted. FWW delivered to bedside. All belongings sent with Pt, nothing left behind in room.

## 2025-07-27 NOTE — PROGRESS NOTES
Pt refusing morning dose of metformin. Pt states it makes him nauseous. Education provided including, blood glucose management, and risk factors associated with uncontrolled diabetes and blood glucose levels. Anti-emetics offered to help decrease nausea and vomiting and Pt continues to decline.

## 2025-07-27 NOTE — DISCHARGE PLANNING
DC Transport Scheduled    Transport Company Scheduled:  [employer] [facility]   Spoke with Chanelle at [facility] to schedule transport.  [facility] Trip #: [id number] (delete if not MTM)    Scheduled Date: 07/27/2025  Scheduled Time: 1500    Transport Type: Self  Destination: Destination Name: Patient's Home  Destination address: [facility] [address]  [place] [address]    Notified care team of scheduled transport via Voalte.     If there are any changes needed to the DC transportation scheduled, please contact [facility] at ext. [phone] between the hours of [phone]. If outside those hours, contact the ED Case Manager at ext. [phone].

## 2025-07-27 NOTE — THERAPY
Physical Therapy   Daily Treatment     Patient Name:  Norm Prabhakar  Age:  45 y.o., Sex:  male  Medical Record #:  [id number]  Today's Date: 7/27/2025     Precautions  Medical: Fall Risk    Assessment    Pt seen for PT treatment session at request of MD to evaluate possibility of DC home vs SNF. Today pt was able to complete all mobility at SPV level. Demonstrated good safety awareness by requesting to use FWW vs SPC for ambulation. Ambulated 300 ft with FWW and SPV. Pt has no stairs to access ground floor apartment and reports good support from his Community  who assists with groceries and transportation to appointments. Pt with no further acute PT needs at this time. Continue to ambulate with nursing staff while admitted to reduce potential for hospital acquired deconditioning.     Plan    Treatment Plan Status: Modify Current Treatment Plan  Type of Treatment: Bed Mobility, Equipment, Gait Training, Neuro Re-Education / Balance, Self Care / Home Evaluation, Stair Training, Therapeutic Exercise, Therapeutic Activities, Family / Caregiver Training  Treatment Frequency: 4 Times per Week  Treatment Duration: Until Therapy Goals Met    DC Equipment Recommendations: Front-Wheel Walker  Discharge Recommendations: Recommend home health for continued physical therapy services    Objective     07/27/25 1056   Precautions   Medical Fall Risk   Vitals   O2 Delivery Device None - Room Air   Pain 0 - 10 Group   Therapist Pain Assessment Nurse Notified;0   Cognition    Speech/ Communication Hard of Hearing   Level of Consciousness Alert   Comments pleasant and receptive to PT, eager to DC home. Reports having a Fiance and Dtr in [place] he is hopeful to buy a home for   Active ROM Lower Body    Active ROM Lower Body  WDL   Comments mildly decreasead ankle DF during swing as ambulation distance increased   Strength Lower Body   Lower Body Strength  WDL   Comments adequate for functional moblity   Sensation Lower  Body   Lower Extremity Sensation   Not Tested   Balance   Sitting Balance (Static) Good   Sitting Balance (Dynamic) Good   Standing Balance (Static) Fair +   Standing Balance (Dynamic) Fair   Weight Shift Sitting Good   Weight Shift Standing Fair   Skilled Intervention Verbal Cuing   Comments with FWW, pt requested FWW vs SPC for safety- good insight into current abilities   Bed Mobility    Supine to Sit Supervised   Sit to Supine Supervised   Scooting Supervised   Skilled Intervention Verbal Cuing   Gait Analysis   Gait Level Of Assist Supervised   Assistive Device Front Wheel Walker   Distance (Feet) 300   # of Times Distance was Traveled 1   Deviation Bradykinetic;Decreased Heel Strike;Decreased Toe Off   # of Stairs Climbed 0   Weight Bearing Status no restrictions   Skilled Intervention Verbal Cuing   Comments no overt LOB, slow but steady gait. Slightly increased reliance on UE support with FWW   Functional Mobility   Sit to Stand Supervised   Bed, Chair, Wheelchair Transfer Supervised   Transfer Method Stand Step   Skilled Intervention Verbal Cuing   Activity Tolerance   Sitting in Chair declined due to discomfort   Sitting Edge of Bed 8-10 min   Standing 16+ min   Short Term Goals    Short Term Goal # 1 in 6 visits patient will demo all bed mobility indep from flat surface for safe DC   Goal Outcome # 1 Goal met   Short Term Goal # 2 in 6 visits patient will demo all functional transfers with Sup and LRAD for safe DC   Goal Outcome # 2 Goal met   Short Term Goal # 3 in 6 visits patient will ambualte 200' with Sup and LRAD for safe DC   Goal Outcome # 3 Goal met   Physical Therapy Treatment Plan   Physical Therapy Treatment Plan Modify Current Treatment Plan   Reason For Discharge Discharge Secondary to Goals Met   Anticipated Discharge Equipment and Recommendations   DC Equipment Recommendations Front-Wheel Walker   Discharge Recommendations Recommend home health for continued physical therapy services

## 2025-07-27 NOTE — DISCHARGE PLANNING
ATTN: Case Management  RE: Referral for Home Health    Reason for referral denial: We are currently at capacity with patient's insurance              Unfortunately, we are not able to accept this referral for the reason listed above. If further clarity is needed, our Transitional Care Specialists are available to discuss any barriers to service at [phone].      We look forward to collaborating with you in the future,  [facility]

## 2025-07-27 NOTE — DISCHARGE PLANNING
Received Choice Form at: 0189  Agency/Facility Name: [facility], [facility], [facility], [facility], [facility], [facility], [facility], [facility], [facility], [facility], [facility], [facility]  Sent Referral per Choice Form at: 4042

## 2025-07-27 NOTE — DISCHARGE SUMMARY
"Discharge Summary    CHIEF COMPLAINT ON ADMISSION  Chief Complaint   Patient presents with    Possible Stroke     Pt MAGNO VIZCARRA from home. Per report, pt had sudden onset R sided weakness and R facial droop around 1415 today. Pt self reports hx of TIA in January. Denies blood thinner use. Pt reports he is \"blind\" in his left eye       Reason for Admission  Stroke     Admission Date  7/20/2025    CODE STATUS  Full Code    HPI & HOSPITAL COURSE    Mr. Norm Prabhakar is a 45 y.o. male with a pmh of DM type 1, seizure DO, conversion DO, PTSD, bipolar DO, hypothyroidism, s/p thyroidectomy, poor medication compliance who presented to the ED on 7/20/2025 with complaints of facial droop and weakness.     On examination, the patient tells me that he has been having very poor appetite since being started on it was epic three weeks ago. He is getting weaker, more fatigued and having difficulty walking due to his poor nutritional intake. This morning, when he took his medications, and ended up vomiting them up. He is also been having some diarrhea since starting was epic. Patient says his right sided weakness is worse than normal, but feels this is exacerbated his increasing fatigue and general weakness. I spoke with patient's  who tells me that she is the one that asked him to call 911 when he told her he was having some tingling in his arm. Patient denies any current numbness or tingling, difficulty with speech, difficulty with swallowing. He denies any other focal weakness, dizziness, lightheadedness. He denies  complaints.     Patient says he himself, takes the bus around town, is normal able to take care of himself.  Patient was evaluated by neurology while in the ED who feels patient symptoms are not CVA related, and recommends no additional neurological workup. Patient was hopeful to discharge from the ED; however, he reported ongoing bilateral lower extremity weakness. He felt using a cane may be helpful, " so was provided one. Fortunately, patient was unable to ambulate well enough to be discharged home.     CBC unremarkable. CMP with glucose 274, sodium 132. Otherwise unremarkable. Troponin normal. A1c 13.7, which is improved from prior. CT head, CT and CT perfusion are all unremarkable.  Patient admitted to hospital medicine for management of care.    On assessment, patient is back to his baseline, however, when evaluated by PT/OT, patient will likely need placement to rehab.  Patient's symptoms inconsistent with any neurological disorder however, patient does have a pretty significant history of conversion disorder.  While being evaluated, patient was unable to do assessment tasks however, when patient given a gown to change into, he was able to place himself in again with no difficulty, no falls, and no neurological deficits or weakness noted.    TSH very high (80), FT4 low (0.39).  Patient reports he has been taking his Synthroid.  He follows closely with his endocrinologist.  Left the Synthroid dose the same 200 mcg.    His abdominal pain and nausea/vomiting was thought to be due to Ozempic.  That was held and discontinued at time of discharge.    He was hypoglycemic so his insulin was decreased.    On day of discharge he had improvement in his weakness was able to ambulate with a walker.  His sugars were controlled and he was tolerating p.o.  Needs close PCP follow-up as well as endocrinology follow-up.       Therefore, he is discharged in fair and stable condition to home with close outpatient follow-up.    The patient met 2-midnight criteria for an inpatient stay at the time of discharge.    Discharge Date  7/27/2025    FOLLOW UP ITEMS POST DISCHARGE  PCP follow-up  Endocrinology follow-up, monitor sugars and TSH  Continues to have multiple heterogeneous enhancing neck masses anteriorly which are unchanged from 8/2024  Psych follow up    DISCHARGE DIAGNOSES  Principal Problem (Resolved):    Stroke-like  symptom (POA: Yes)  Active Problems:    Postoperative hypothyroidism (POA: Yes)    Conversion disorder (POA: Yes)    Other hyperlipidemia (POA: Yes)    Seizure disorder (HCC) (POA: Yes)    Hypothyroidism (POA: Yes)    Type 1 diabetes mellitus with hyperglycemia (HCC) (POA: Yes)    Bipolar disorder (HCC) (POA: Yes)    Generalized weakness (POA: Yes)    Pseudohyponatremia (POA: Yes)  Resolved Problems:    ANA (acute kidney injury) (HCC) (POA: Yes)    Hypokalemia (POA: Yes)    Uncontrolled type 2 diabetes mellitus with hyperglycemia (HCC) (POA: Yes)      FOLLOW UP  No future appointments.  ANIKET Mike  [address] [id number] [phone]    Schedule an appointment as soon as possible for a visit in 1 week(s)      Abdirizak Jordan M.D.  [address] [facility] [address]  [phone]    Schedule an appointment as soon as possible for a visit        MEDICATIONS ON DISCHARGE     Medication List        START taking these medications        Instructions   Embecta Pen Needle Olesya 2 Gen  Generic drug: Insulin Pen Needle 32 G x 4 mm   Doctor's comments: Per patient/formulary preference. ICD-10 code: E11.65 Uncontrolled type 2 Diabetes Mellitus  Use one pen needle in pen device to inject insulin five times daily.     TechLite Lancets Misc   Doctor's comments: Or per formulary preference. ICD-10 code: E11.65 Uncontrolled type 2 Diabetes Mellitus  Use one lancet to test blood sugar three times daily before meals.     vitamin D3 125 MCG (5000 UT) Caps  Start taking on: July 28, 2025   Take 1 Capsule by mouth every day.  Dose: 5,000 Units            CHANGE how you take these medications        Instructions   Lantus SoloStar 100 UNIT/ML Sopn injection PEN  What changed:   how much to take  when to take this  Generic drug: insulin glargine   Inject 32 Units under the skin 2 times a day.  Dose: 32 Units            CONTINUE taking these medications        Instructions   aspirin 81 MG Chew chewable  tablet  Commonly known as: Asa   Chew 81 mg every day.  Dose: 81 mg     atorvastatin 80 MG tablet  Commonly known as: Lipitor   Take 80 mg by mouth every day.  Dose: 80 mg     busPIRone 5 MG tablet  Commonly known as: Buspar   Take 5 mg by mouth 2 times a day.  Dose: 5 mg     divalproex 500 MG Tbec  Commonly known as: Depakote   Take 500-1,500 mg by mouth 2 times a day. 500 mg in the AM  1500 mg in the PM  Dose: 500-1,500 mg     Fenofibrate 134 MG Caps   Take 134 mg by mouth every day.  Dose: 134 mg     HumaLOG KwikPen 100 UNIT/ML Sopn injection PEN  Generic drug: insulin lispro   Inject 6 Units under the skin 3 times a day before meals.  Dose: 6 Units     Latuda 80 MG Tabs  Generic drug: lurasidone   Take 80 mg by mouth with dinner.  Dose: 80 mg     levothyroxine 200 MCG Tabs  Commonly known as: Synthroid   Take 1 Tablet by mouth every morning on an empty stomach.  Dose: 200 mcg     metformin 1000 MG tablet  Commonly known as: Glucophage   Take 1,000 mg by mouth 2 times a day.  Dose: 1,000 mg     naltrexone 50 MG Tabs  Commonly known as: Depade   Take 50 mg by mouth every day.  Dose: 50 mg     sertraline 100 MG Tabs  Commonly known as: Zoloft   Take 200 mg by mouth every day. 200 mg = 2 tabs  Dose: 200 mg     terbinafine 250 MG Tabs  Commonly known as: LamISIL   Take 250 mg by mouth every day. X 12 weeks  Dose: 250 mg     traZODone 100 MG Tabs  Commonly known as: Desyrel   Take 1 Tablet by mouth at bedtime as needed for Sleep. Indications: Trouble Sleeping  Dose: 100 mg            STOP taking these medications      Ozempic (0.25 or 0.5 MG/DOSE) 2 MG/3ML Sopn  Generic drug: Semaglutide(0.25 or 0.5MG/DOS)              Allergies  Allergies[1]    DIET  Orders Placed This Encounter   Procedures    Diet Order Diet: Consistent CHO (Diabetic)     Standing Status:   Standing     Number of Occurrences:   1     Diet::   Consistent CHO (Diabetic) [4]    Discontinue Diet Tray     Standing Status:   Standing     Number of  Occurrences:   1       ACTIVITY  As tolerated.  Weight bearing as tolerated    CONSULTATIONS  None    PROCEDURES  None    Discharge exam:  Physical Exam  Constitutional:       General: He is not in acute distress.     Appearance: He is not ill-appearing, toxic-appearing or diaphoretic.   HENT:      Head: Normocephalic and atraumatic.      Mouth/Throat:      Mouth: Mucous membranes are moist.   Eyes:      General: No scleral icterus.     Conjunctiva/sclera: Conjunctivae normal.   Cardiovascular:      Rate and Rhythm: Normal rate and regular rhythm.      Heart sounds: No murmur heard.     No friction rub. No gallop.   Pulmonary:      Effort: Pulmonary effort is normal.      Breath sounds: Normal breath sounds.   Abdominal:      General: Bowel sounds are normal. There is no distension.      Palpations: Abdomen is soft.      Tenderness: There is no abdominal tenderness.   Musculoskeletal:      Right lower leg: No edema.      Left lower leg: No edema.   Skin:     Coloration: Skin is not jaundiced.      Findings: No rash.   Neurological:      Mental Status: He is alert and oriented to person, place, and time. Mental status is at baseline.      Motor: No weakness.   Psychiatric:         Mood and Affect: Mood normal.         Behavior: Behavior normal.           LABORATORY  Lab Results   Component Value Date    SODIUM 137 07/26/2025    POTASSIUM 4.0 07/26/2025    CHLORIDE 98 07/26/2025    CO2 25 07/26/2025    GLUCOSE 109 (H) 07/26/2025    BUN 22 07/26/2025    CREATININE 1.31 07/26/2025    GLOMRATE 89 05/18/2023        Lab Results   Component Value Date    WBC 8.1 07/23/2025    HEMOGLOBIN 12.6 (L) 07/23/2025    HEMATOCRIT 36.8 (L) 07/23/2025    PLATELETCT 275 07/23/2025      DX-CHEST-PORTABLE (1 VIEW)  Narrative: 7/20/2025 3:04 PM    HISTORY/REASON FOR EXAM:  Stroke    TECHNIQUE/EXAM DESCRIPTION AND NUMBER OF VIEWS:  Single portable view of the chest.    COMPARISON: None    FINDINGS:    There is no evidence of focal  consolidation or evidence of pulmonary edema.  There is no pleural effusion.  The heart is normal in size.  Impression: No evidence of acute cardiopulmonary process.  CT-CTA HEAD WITH & W/O-POST PROCESS  Narrative: 7/20/2025 2:53 PM    HISTORY/REASON FOR EXAM:  Stroke. Right-sided deficits. Difficulty speaking.    TECHNIQUE/EXAM DESCRIPTION:  CT angiogram of the [place] without and with contrast.  Initial precontrast images were obtained of the head from the skull base through the vertex.  Postcontrast images were obtained of the [place] following the power injection of   nonionic contrast at 5.0 mL/sec. Thin-section helical images were obtained with overlapping reconstruction interval. Coronal and sagittal multiplanar volume reformats were generated.  3D angiographic images were reviewed on PACS.  Maximum intensity   projection (MIP) images were generated and reviewed.    80 mL of Omnipaque 350 nonionic contrast was injected intravenously.    Low dose optimization technique was utilized for this CT exam including automated exposure control and adjustment of the mA and/or kV according to patient size.    COMPARISON:  None.    FINDINGS:  The posterior circulation shows the distal vertebral arteries to be patent. The vertebrobasilar confluence is intact. The basilar artery is patent. No aneurysm or occlusive lesion is evident.    The anterior circulation shows no stenotic or occlusive lesion. No aneurysm is evident about the [place].    The brain is unremarkable.    3D angiographic/MIP images of the vasculature confirm the vascular findings as described above.  Impression: CT angiogram of the [place] within normal limits.  CT-CTA NECK WITH & W/O-POST PROCESSING  Narrative: 7/20/2025 2:53 PM    HISTORY/REASON FOR EXAM:  Stroke. Difficulty speaking. Right-sided deficits.    TECHNIQUE/EXAM DESCRIPTION:  CT angiogram of the neck with contrast.    Postcontrast images were obtained of the  neck from the great vessels through the skull base following the power injection of nonionic contrast at 5.0 mL/sec. Thin-section helical images were obtained with overlapping reconstruction interval. Coronal and   oblique multiplanar volume reformats were generated.    Cervical internal carotid artery percent stenosis is calculated using the standard method according to the NASCET criteria wherein a segment of uniform caliber mid or distal cervical internal carotid is used as the reference denominator.    3D angiographic images were reviewed on PACS.  Maximum intensity projection (MIP) images were generated and reviewed    80 mL of Omnipaque 350 nonionic contrast was injected intravenously.    Low dose optimization technique was utilized for this CT exam including automated exposure control and adjustment of the mA and/or kV according to patient size.    COMPARISON:  8/5/2024    FINDINGS:    There is not atherosclerotic plaque of the aorta.    Right common carotid artery: Patent    Right internal carotid artery: Normal in appearance    Left common carotid artery : Patent.    Left internal carotid artery: Normal in appearance    The right vertebral artery is patent without dissection or stenosis.    The left vertebral artery is patent without dissection or stenosis.    Vertebrobasilar confluence: The vertebrobasilar confluence appears normal.    Lung apices are clear    There again seen anterior heterogeneous enhancing neck masses the largest of which measures 3.7 x 3.0 cm in size. This is unchanged from comparison.    3D angiographic/MIP images of the vasculature confirm the vascular findings as described above.  Impression: 1.  No evidence of carotid or vertebral arterial occlusion or dissection.    2.  Again seen multiple heterogeneous enhancing neck masses anteriorly the largest of which measures 3.7 x 3.0 cm in size. These are unchanged from comparison.  CT-CEREBRAL PERFUSION ANALYSIS  Narrative: 7/20/2025 2:53  PM    HISTORY/REASON FOR EXAM:  Stroke. Right-sided deficits. Difficulty speaking..    TECHNIQUE/EXAM DESCRIPTION AND NUMBER OF VIEWS:  CT Cerebral Perfusion Analysis.    The study was performed on a 128 slice [employer]. Lightspeed Multidetector CT scanner. Perfusion data and corresponding time-activity curves are processed and displayed as color-coded maps in the axial plane for Cerebral Blood Flow (CBF), Cerebral Blood Volume   (CBV),T Max and Mean Transit Time (MTT) and are post processed on the Ischemia view-RAPID virtual .    40 mL of Omnipaque 350 nonionic contrast was injected intravenously.    Low dose optimization technique was utilized for this CT exam including automated exposure control and adjustment of the mA and/or kV according to patient size.    COMPARISON:    FINDINGS:    Cerebral blood flow less than 30% = 1 mL    T Max greater than 6 seconds = 16 mL    Mismatch volume= 15 mL    Mismatch ratio= 16  Impression: 1. Cerebral blood flow less than 30% possibly representing completed infarct = 1 mL. Based on distribution of this finding, this is likely to represent artifact.    2. T Max more than 6 seconds possibly representing combination of completed infarct and ischemia = 16 mL. Based on the distribution of this finding, this is likely to represent artifact.    3. Mismatched volume possibly representing ischemic brain/penumbra= 15 mL    4.  Please note that this cerebral perfusion study and report is Quantitative and targets supratentorial (cerebral) perfusion for evaluation of large vessel territory acute ischemia/infarction. For example, lacunar infarcts, and brainstem/posterior fossa   ischemia/infarction are not evaluated on this study.  Data acquisition is subject to artifacts which can yield non-anatomically plausible perfusion maps which may be due to motion, bolus timing, signal to noise ratio, or other technical factors.   Perfusion map abnormalities which show non-anatomic distributions are  "likely artifact.   This study is not \"stand-alone\" and should only be utilized for diagnosis, management/treatment in correlation with CT, CTA, and/or MRI and clinical factors.  CT-HEAD W/O  Narrative: 7/20/2025 2:53 PM    HISTORY/REASON FOR EXAM: Stroke. Right-sided neurologic deficits. Difficulty speaking.    TECHNIQUE/EXAM DESCRIPTION AND NUMBER OF VIEWS:  CT of the head without contrast.    Up to date radiation dose reduction adjustments have been utilized to meet ALARA standards for radiation dose reduction.    COMPARISON: 3/16/2025    FINDINGS: There is no evidence of mass or mass effect. CSF spaces are normal. There is prominent periventricular white matter disease.. There is no intracranial hemorrhage seen. The calvarium is intact. There is no scalp injury. There is no evidence of   sinus disease.  Impression: 1.  No evidence of acute intracranial process.    2.  Again seen prominent diffuse white matter disease.        Total time of the discharge process exceeds 37 minutes.       [1]   Allergies  Allergen Reactions    Geodon [Ziprasidone Hcl] Anaphylaxis     Anaphylaxis per patient    Aripiprazole      \"I became lethargic.\"    Abilify      \"Feeling tired, like I don't even know whats going on around me\"    Fish      Pt reports salmon causes him to be sick to his stomach  Not listed on MAR noted 2/3/2021      Hydroxyzine Itching     Pt will only state \"I feel itchy when I take it     "

## 2025-07-27 NOTE — CARE PLAN
The patient is Stable - Low risk of patient condition declining or worsening    Shift Goals  Clinical Goals: manage BG, free from falls this shift  Patient Goals: two milks  Family Goals: JUANCARLOS    Progress made toward(s) clinical / shift goals:   Pt remained free from falls this shift. Fall prevention measures in place including treaded socks, area free from clutter, pt educated on call light use and call light within reach, hourly rounding, and pt educated on safety plan. Bed alarm in place. Pt's blood glucose was monitored and pt was medicated per MAR this shift.     Problem: Acute Care of the Diabetic Patient  Goal: Optimal Outcome for the Diabetic Patient  Outcome: Progressing     Problem: Fall Risk  Goal: Patient will remain free from falls  Outcome: Progressing     Patient is not progressing towards the following goals:

## 2025-07-28 NOTE — DISCHARGE PLANNING
Per per epic link, [facility] declined  Referral sent to [facility]    1030- [facility] declined  Referral sent to [facility]

## 2025-08-02 ENCOUNTER — APPOINTMENT (OUTPATIENT)
Dept: RADIOLOGY | Facility: MEDICAL CENTER | Age: 45
End: 2025-08-02
Attending: EMERGENCY MEDICINE
Payer: MEDICAID

## 2025-08-02 ENCOUNTER — HOSPITAL ENCOUNTER (EMERGENCY)
Facility: MEDICAL CENTER | Age: 45
End: 2025-08-02
Attending: EMERGENCY MEDICINE
Payer: MEDICAID

## 2025-08-02 VITALS
HEIGHT: 78 IN | BODY MASS INDEX: 28.11 KG/M2 | DIASTOLIC BLOOD PRESSURE: 79 MMHG | SYSTOLIC BLOOD PRESSURE: 114 MMHG | WEIGHT: 243 LBS | HEART RATE: 88 BPM | RESPIRATION RATE: 18 BRPM | OXYGEN SATURATION: 95 % | TEMPERATURE: 97.7 F

## 2025-08-02 DIAGNOSIS — F43.21 SITUATIONAL DEPRESSION: Primary | ICD-10-CM

## 2025-08-02 DIAGNOSIS — S63.501A SPRAIN OF RIGHT WRIST, INITIAL ENCOUNTER: ICD-10-CM

## 2025-08-02 LAB
GLUCOSE BLD STRIP.AUTO-MCNC: 331 MG/DL (ref 65–99)
POC BREATHALIZER: 0 PERCENT (ref 0–0.01)

## 2025-08-02 PROCEDURE — 302970 POC BREATHALIZER: Performed by: EMERGENCY MEDICINE

## 2025-08-02 PROCEDURE — 700102 HCHG RX REV CODE 250 W/ 637 OVERRIDE(OP): Mod: UD | Performed by: EMERGENCY MEDICINE

## 2025-08-02 PROCEDURE — A9270 NON-COVERED ITEM OR SERVICE: HCPCS | Mod: UD | Performed by: EMERGENCY MEDICINE

## 2025-08-02 PROCEDURE — 73110 X-RAY EXAM OF WRIST: CPT | Mod: RT

## 2025-08-02 PROCEDURE — 82962 GLUCOSE BLOOD TEST: CPT | Performed by: EMERGENCY MEDICINE

## 2025-08-02 PROCEDURE — 99285 EMERGENCY DEPT VISIT HI MDM: CPT

## 2025-08-02 RX ORDER — BUSPIRONE HYDROCHLORIDE 10 MG/1
10 TABLET ORAL ONCE
Status: COMPLETED | OUTPATIENT
Start: 2025-08-02 | End: 2025-08-02

## 2025-08-02 RX ADMIN — BUSPIRONE HYDROCHLORIDE 10 MG: 10 TABLET ORAL at 20:59

## 2025-08-02 ASSESSMENT — FIBROSIS 4 INDEX: FIB4 SCORE: 1.19
